# Patient Record
Sex: FEMALE | Race: WHITE | NOT HISPANIC OR LATINO | Employment: OTHER | URBAN - METROPOLITAN AREA
[De-identification: names, ages, dates, MRNs, and addresses within clinical notes are randomized per-mention and may not be internally consistent; named-entity substitution may affect disease eponyms.]

---

## 2017-02-08 ENCOUNTER — LAB VISIT (OUTPATIENT)
Dept: LAB | Facility: HOSPITAL | Age: 57
End: 2017-02-08
Attending: INTERNAL MEDICINE
Payer: MEDICAID

## 2017-02-08 DIAGNOSIS — T62.8X1A: ICD-10-CM

## 2017-02-08 DIAGNOSIS — N18.30 CHRONIC KIDNEY DISEASE, STAGE III (MODERATE): ICD-10-CM

## 2017-02-08 DIAGNOSIS — N15.9: ICD-10-CM

## 2017-02-08 DIAGNOSIS — I10 ESSENTIAL HYPERTENSION, MALIGNANT: ICD-10-CM

## 2017-02-08 DIAGNOSIS — D63.1 ANEMIA IN CHRONIC KIDNEY DISEASE(285.21): Primary | ICD-10-CM
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
## 2017-02-08 DIAGNOSIS — N18.9 ANEMIA IN CHRONIC KIDNEY DISEASE(285.21): Primary | ICD-10-CM

## 2017-02-08 DIAGNOSIS — T82.598A MECHANICAL FAILURE OF INSTRUMENT OR APPARATUS DURING HEART CATHETERIZATION(E874.5): ICD-10-CM

## 2017-02-08 DIAGNOSIS — E11.9 TYPE II OR UNSPECIFIED TYPE DIABETES MELLITUS WITHOUT MENTION OF COMPLICATION, NOT STATED AS UNCONTROLLED: ICD-10-CM

## 2017-02-08 DIAGNOSIS — R80.9 PROTEINURIA: ICD-10-CM

## 2017-02-08 LAB
ALBUMIN SERPL BCP-MCNC: 3.6 G/DL
ANION GAP SERPL CALC-SCNC: 10 MMOL/L
BACTERIA #/AREA URNS HPF: ABNORMAL /HPF
BILIRUB UR QL STRIP: NEGATIVE
BUN SERPL-MCNC: 51 MG/DL
CALCIUM SERPL-MCNC: 9.6 MG/DL
CHLORIDE SERPL-SCNC: 110 MMOL/L
CLARITY UR: CLEAR
CO2 SERPL-SCNC: 20 MMOL/L
COLOR UR: YELLOW
CREAT SERPL-MCNC: 2.4 MG/DL
EST. GFR  (AFRICAN AMERICAN): 25 ML/MIN/1.73 M^2
EST. GFR  (NON AFRICAN AMERICAN): 22 ML/MIN/1.73 M^2
FERRITIN SERPL-MCNC: 108 NG/ML
GLUCOSE SERPL-MCNC: 225 MG/DL
GLUCOSE UR QL STRIP: NEGATIVE
HGB UR QL STRIP: ABNORMAL
HYALINE CASTS #/AREA URNS LPF: 1 /LPF
IRON SERPL-MCNC: 42 UG/DL
KETONES UR QL STRIP: NEGATIVE
LEUKOCYTE ESTERASE UR QL STRIP: ABNORMAL
MAGNESIUM SERPL-MCNC: 2.1 MG/DL
MICROSCOPIC COMMENT: ABNORMAL
NITRITE UR QL STRIP: NEGATIVE
PH UR STRIP: 5 [PH] (ref 5–8)
PHOSPHATE SERPL-MCNC: 4.2 MG/DL
POTASSIUM SERPL-SCNC: 5.5 MMOL/L
PROT UR QL STRIP: ABNORMAL
RBC #/AREA URNS HPF: 2 /HPF (ref 0–4)
SATURATED IRON: 11 %
SODIUM SERPL-SCNC: 140 MMOL/L
SP GR UR STRIP: 1.02 (ref 1–1.03)
SQUAMOUS #/AREA URNS HPF: 5 /HPF
TOTAL IRON BINDING CAPACITY: 371 UG/DL
TRANSFERRIN SERPL-MCNC: 251 MG/DL
URATE SERPL-MCNC: 7.3 MG/DL
URN SPEC COLLECT METH UR: ABNORMAL
UROBILINOGEN UR STRIP-ACNC: NEGATIVE EU/DL
WBC #/AREA URNS HPF: 3 /HPF (ref 0–5)

## 2017-02-08 PROCEDURE — 84550 ASSAY OF BLOOD/URIC ACID: CPT

## 2017-02-08 PROCEDURE — 83540 ASSAY OF IRON: CPT

## 2017-02-08 PROCEDURE — 82728 ASSAY OF FERRITIN: CPT

## 2017-02-08 PROCEDURE — 80069 RENAL FUNCTION PANEL: CPT

## 2017-02-08 PROCEDURE — 36415 COLL VENOUS BLD VENIPUNCTURE: CPT

## 2017-02-08 PROCEDURE — 83735 ASSAY OF MAGNESIUM: CPT

## 2017-02-08 PROCEDURE — 81000 URINALYSIS NONAUTO W/SCOPE: CPT

## 2017-02-13 ENCOUNTER — HOSPITAL ENCOUNTER (OUTPATIENT)
Dept: RADIOLOGY | Facility: HOSPITAL | Age: 57
Discharge: HOME | End: 2017-02-13
Attending: ORTHOPAEDIC SURGERY
Payer: MEDICAID

## 2017-02-13 ENCOUNTER — OFFICE VISIT (OUTPATIENT)
Dept: SPORTS MEDICINE | Facility: CLINIC | Age: 57
End: 2017-02-13
Payer: MEDICAID

## 2017-02-13 VITALS
DIASTOLIC BLOOD PRESSURE: 77 MMHG | WEIGHT: 227 LBS | SYSTOLIC BLOOD PRESSURE: 148 MMHG | HEIGHT: 67 IN | BODY MASS INDEX: 35.63 KG/M2 | HEART RATE: 89 BPM

## 2017-02-13 DIAGNOSIS — M75.22 BICEPS TENDINITIS ON LEFT: ICD-10-CM

## 2017-02-13 DIAGNOSIS — Z22.322 MRSA (METHICILLIN RESISTANT STAPHYLOCOCCUS AUREUS) CARRIER: ICD-10-CM

## 2017-02-13 DIAGNOSIS — M75.102 ROTATOR CUFF SYNDROME, LEFT: ICD-10-CM

## 2017-02-13 DIAGNOSIS — M75.102 ROTATOR CUFF SYNDROME OF LEFT SHOULDER: ICD-10-CM

## 2017-02-13 DIAGNOSIS — M75.102 ROTATOR CUFF SYNDROME OF LEFT SHOULDER: Primary | ICD-10-CM

## 2017-02-13 PROCEDURE — 99214 OFFICE O/P EST MOD 30 MIN: CPT | Mod: S$PBB,,, | Performed by: ORTHOPAEDIC SURGERY

## 2017-02-13 PROCEDURE — 99999 PR PBB SHADOW E&M-EST. PATIENT-LVL V: CPT | Mod: PBBFAC,,, | Performed by: ORTHOPAEDIC SURGERY

## 2017-02-13 NOTE — MR AVS SNAPSHOT
[facility]  [address]  [facility] [address]  Phone: [phone]                  Andra Newell   2017 10:15 AM   Office Visit    Description:  Female : 1960   Provider:  Moe Meléndez MD   Department:  [facility]           Reason for Visit     Left Shoulder - Pain           Diagnoses this Visit        Comments    Rotator cuff syndrome of left shoulder    -  Primary     Rotator cuff syndrome, left         Biceps tendinitis on left         MRSA (methicillin resistant Staphylococcus aureus) carrier                To Do List           Future Appointments        Provider Department Dept Phone    2017 11:05 AM [facility] XR2 SPORTS  LB LIMIT [facility] [phone]      Goals (5 Years of Data)     None      Follow-Up and Disposition     Return in about 6 weeks (around 3/27/2017) for RTC in 6-8 weeks with Brenda Manfsield PA-C for pre-operative history and physical. Patient will not .      [facility]     [facility] -  Assistance  Registered nurses in the [facility] provide clinical advisement, health education, appointment booking, and other advisory services.  Call for this free service at [phone].             Medications           Message regarding Medications     Verify the changes and/or additions to your medication regime listed below are the same as discussed with your clinician today.  If any of these changes or additions are incorrect, please notify your healthcare provider.             Verify that the below list of medications is an accurate representation of the medications you are currently taking.  If none reported, the list may be blank. If incorrect, please contact your healthcare provider. Carry this list with you in case of emergency.           Current Medications     albuterol (PROAIR HFA) 90 mcg/actuation inhaler Inhale 2 puffs into the lungs every 6 (six)  "hours as needed for Wheezing.    allopurinol (ZYLOPRIM) 100 MG tablet Take 100 mg by mouth once daily.    amlodipine (NORVASC) 5 MG tablet Take 5 mg by mouth once daily.    atorvastatin (LIPITOR) 20 MG tablet Take 20 mg by mouth once daily.    benzonatate (TESSALON) 200 MG capsule Take 200 mg by mouth 3 (three) times daily as needed for Cough.    doxazosin (CARDURA) 2 MG tablet Take 2 mg by mouth every evening.    fluticasone (FLONASE) 50 mcg/actuation nasal spray 1 spray by Each Nare route 2 (two) times daily as needed for Rhinitis.    hydrocodone-homatropine 5-1.5 mg/5 ml (HYCODAN) 5-1.5 mg/5 mL Syrp Take 5 mLs by mouth every 4 (four) hours as needed.    INSULIN GLARGINE,HUM.REC.ANLOG (LANTUS SUBQ) Inject 70 Units into the skin nightly.     insulin glulisine (APIDRA) 100 unit/mL injection Inject 2 Units into the skin 3 (three) times daily after meals.    irbesartan (AVAPRO) 150 MG tablet Take 150 mg by mouth every evening.    levothyroxine (SYNTHROID) 100 MCG tablet Take 100 mcg by mouth once daily.    lisinopril (PRINIVIL,ZESTRIL) 5 MG tablet Take 5 mg by mouth once daily.    loratadine (CLARITIN) 10 mg tablet Take 1 tablet (10 mg total) by mouth once daily.    lorazepam (ATIVAN) 2 MG Tab Take 0.5 mg by mouth every 6 (six) hours as needed.    montelukast (SINGULAIR) 10 mg tablet Take 10 mg by mouth every evening.    omeprazole (PRILOSEC) 20 MG capsule Take 20 mg by mouth once daily.    vilazodone (VIIBRYD) 20 mg Tab Take by mouth.    gabapentin (NEURONTIN) 600 MG tablet Take 1 tablet (600 mg total) by mouth 3 (three) times daily.           Clinical Reference Information           Your Vitals Were     BP Pulse Height Weight Last Period BMI    148/77 89 5' 7" (1.702 m) 103 kg (227 lb) 02/28/2012 35.55 kg/m2      Blood Pressure          Most Recent Value    BP  (!)  148/77      Allergies as of 2/13/2017     Codeine      Immunizations Administered on Date of Encounter - 2/13/2017     None      Orders Placed During " Today's Visit      Normal Orders This Visit    Ambulatory Referral to Physical/Occupational Therapy     Future Labs/Procedures Expected by Expires    X-Ray Shoulder 2 or More Views Left  2/13/2017 2/13/2018      Instructions      Shoulder Arthroscopy: Conditions Treated  You will be given instructions for how to prepare for your surgery and when you should arrive at the hospital or surgery center. Just before surgery, a doctor will talk to you about the anesthesia that will be used to keep you free of pain during surgery. You may be asked by several people to confirm which shoulder is being operated on. This is for your safety. Below are common shoulder problems and how they are treated during arthroscopy.       Impingement  · Repeated overhead movements can inflame your rotator cuff and bursa. A bone spur may also form. This causes pain and problems with certain arm movements. Impingement is also called bursitis or tendinitis.  · During surgery, an inflamed bursa may be removed. Bone may be trimmed. And the coracoacromial ligament may be detached. These steps make more room, relieving pressure and allowing the arm to move more freely.    Torn rotator cuff  · A rotator cuff can tear due to a sudden injury or from overuse. This can cause pain, weakness, and loss of normal shoulder movement.  · During surgery, torn rotator cuff tendons may be trimmed. The tendons are then reattached to the humerus. This is done with stitches, anchors, or surgical tacks.    Stretched capsule  · A stretched capsule will remain loose. A loose capsule cant hold the joint firmly in place. The bones of the joint may feel like they move too much and the shoulder can dislocate.  · During arthroscopy, a stretched capsule is folded over itself and sutured in place. (This is done from inside the capsule.) This tightens the capsule, helping make the shoulder joint more stable.    Torn labrum  · The shoulder is a ball and socket joint.  The  labrum normally supports and cushions the shoulder joint. In the case of a torn labrum, the socket that the ball (the upper end of your arm) fits into is not very deep. The shallow socket increases the  potential for instability.  · The labrum may tear off the rim of the glenoid. This can cause the joint to catch or feel like its slipping out of place. The shoulder may even dislocate.  · A torn labrum is repaired by reattaching it to the glenoid. This is often done with special anchors put into the glenoid bone. Sutures attached to the anchors are tied to hold the labrum in place. The joint then feels more stable.       Arthritis and loose bodies  · Arthritis is damage to joint cartilage with age and use. Injury or disease can also cause it. Wear and tear may also lead to loose bodies (pieces of bone or cartilage) or bone spurs in a joint.  · During surgery, bone spurs are removed. Rough parts of the joint are smoothed. Any loose body is removed from the joint. Bone may also be scraped or shaved to promote new cartilage growth.     Date Last Reviewed: 8/31/2015  © 5877-2406 [employer]. [address] [place] [address]. All rights reserved. This information is not intended as a substitute for professional medical care. Always follow your healthcare professional's instructions.        Shoulder Arthroscopy  The shoulder is your bodys most flexible joint. It lets the arm move in almost any direction. But this flexibility has a price -- it makes the joint prone to injury. If you have a shoulder problem, a surgical procedure called arthroscopy can help.     A camera in the arthroscope allows your surgeon to view your shoulder joint on a monitor.   Your orthopaedic evaluation  Your doctor will ask about your symptoms and the history of your shoulder problem. He or she will examine your shoulder and may give you tests, such as an X-ray or MRI. These help your doctor find the cause of your shoulder  problem.  Arthroscopy: Looking inside your joint  Arthroscopy is a procedure that allows your doctor to see and work inside your shoulder joint. Your doctor makes small incision in your shoulder and inserts a long, thin, lighted instrument, called an arthroscope. During surgery, the arthroscope sends live video images from inside your joint to a screen that your doctor views. Using these images, your doctor can diagnose and treat your shoulder problem. Because arthroscopy uses much smaller incisions than open surgery, recovery is often shorter and less painful.  Risks and possible complications of shoulder arthroscopy  · Stiffness or ongoing pain in your shoulder  · Bleeding or blood clots  · Infection  · Damage to nerves or blood vessels  You may still need open surgery after having arthroscopy.  Date Last Reviewed: 9/10/2015  © 4450-3901 [employer]. [address] [place] [address]. All rights reserved. This information is not intended as a substitute for professional medical care. Always follow your healthcare professional's instructions.             Language Assistance Services     ATTENTION: Language assistance services are available, free of charge. Please call [phone].      ATENCIÓN: Si dottiela darwin, tiene a myers disposición servicios gratuitos de asistencia lingüística. Llame al [phone].     ISAÍAS Ý: N?u b?n nói Ti?ng Vi?t, có các d?ch v? h? tr? ngôn ng? mi?n phí dành cho b?n. G?i s? [phone].         [facility] complies with applicable Federal civil rights laws and does not discriminate on the basis of race, color, national origin, age, disability, or sex.
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
## 2017-02-13 NOTE — PATIENT INSTRUCTIONS
Shoulder Arthroscopy: Conditions Treated  You will be given instructions for how to prepare for your surgery and when you should arrive at the hospital or surgery center. Just before surgery, a doctor will talk to you about the anesthesia that will be used to keep you free of pain during surgery. You may be asked by several people to confirm which shoulder is being operated on. This is for your safety. Below are common shoulder problems and how they are treated during arthroscopy.       Impingement  · Repeated overhead movements can inflame your rotator cuff and bursa. A bone spur may also form. This causes pain and problems with certain arm movements. Impingement is also called bursitis or tendinitis.  · During surgery, an inflamed bursa may be removed. Bone may be trimmed. And the coracoacromial ligament may be detached. These steps make more room, relieving pressure and allowing the arm to move more freely.    Torn rotator cuff  · A rotator cuff can tear due to a sudden injury or from overuse. This can cause pain, weakness, and loss of normal shoulder movement.  · During surgery, torn rotator cuff tendons may be trimmed. The tendons are then reattached to the humerus. This is done with stitches, anchors, or surgical tacks.    Stretched capsule  · A stretched capsule will remain loose. A loose capsule cant hold the joint firmly in place. The bones of the joint may feel like they move too much and the shoulder can dislocate.  · During arthroscopy, a stretched capsule is folded over itself and sutured in place. (This is done from inside the capsule.) This tightens the capsule, helping make the shoulder joint more stable.    Torn labrum  · The shoulder is a ball and socket joint.  The labrum normally supports and cushions the shoulder joint. In the case of a torn labrum, the socket that the ball (the upper end of your arm) fits into is not very deep. The shallow socket increases the  potential for  instability.  · The labrum may tear off the rim of the glenoid. This can cause the joint to catch or feel like its slipping out of place. The shoulder may even dislocate.  · A torn labrum is repaired by reattaching it to the glenoid. This is often done with special anchors put into the glenoid bone. Sutures attached to the anchors are tied to hold the labrum in place. The joint then feels more stable.       Arthritis and loose bodies  · Arthritis is damage to joint cartilage with age and use. Injury or disease can also cause it. Wear and tear may also lead to loose bodies (pieces of bone or cartilage) or bone spurs in a joint.  · During surgery, bone spurs are removed. Rough parts of the joint are smoothed. Any loose body is removed from the joint. Bone may also be scraped or shaved to promote new cartilage growth.     Date Last Reviewed: 8/31/2015  © 5904-5490 [employer]. [address] [place] [address]. All rights reserved. This information is not intended as a substitute for professional medical care. Always follow your healthcare professional's instructions.        Shoulder Arthroscopy  The shoulder is your bodys most flexible joint. It lets the arm move in almost any direction. But this flexibility has a price -- it makes the joint prone to injury. If you have a shoulder problem, a surgical procedure called arthroscopy can help.     A camera in the arthroscope allows your surgeon to view your shoulder joint on a monitor.   Your orthopaedic evaluation  Your doctor will ask about your symptoms and the history of your shoulder problem. He or she will examine your shoulder and may give you tests, such as an X-ray or MRI. These help your doctor find the cause of your shoulder problem.  Arthroscopy: Looking inside your joint  Arthroscopy is a procedure that allows your doctor to see and work inside your shoulder joint. Your doctor makes small incision in your shoulder and inserts a long,  thin, lighted instrument, called an arthroscope. During surgery, the arthroscope sends live video images from inside your joint to a screen that your doctor views. Using these images, your doctor can diagnose and treat your shoulder problem. Because arthroscopy uses much smaller incisions than open surgery, recovery is often shorter and less painful.  Risks and possible complications of shoulder arthroscopy  · Stiffness or ongoing pain in your shoulder  · Bleeding or blood clots  · Infection  · Damage to nerves or blood vessels  You may still need open surgery after having arthroscopy.  Date Last Reviewed: 9/10/2015  © 5581-2069 [employer]. [address] [place] [address]. All rights reserved. This information is not intended as a substitute for professional medical care. Always follow your healthcare professional's instructions.

## 2017-02-13 NOTE — LETTER
February 13, 2017        Juliann Martínez, [facility]  [address] [facility] [place] [address]             [facility] [address] [address]  Phone: [phone]   Patient: Andra Newell   MR Number: [id number]   YOB: 1960   Date of Visit: 2/13/2017       Dear Dr. Martínez:    Thank you for referring Andra Newell to me for evaluation. Below are the relevant portions of my assessment and plan of care.       Encounter Diagnoses   Name Primary?    Rotator cuff syndrome of left shoulder Yes    Rotator cuff syndrome, left     Biceps tendinitis on left     MRSA (methicillin resistant Staphylococcus aureus) carrier            1. ASES and SF-12 was not filled out today in clinic.     RTC in 6-8 weeks with Brenda Mansfield PA-C for pre-operative history and physical. Patient will not fill out ASES, SF-12 on return.      2. We reviewed with Andra today, the pathology and natural history of her diagnosis. We have discussed a variety of treatment options including medications, physical therapy and other alternative treatments. I also explained the indications, risks and benefits of surgery. After discussion, Andra decided to proceed with surgery. The decision was made to go forward with :  1. Left shoulder arthroscopic rotator cuff repair   (Collagen scaffold - Rotation Medical Device) / Revision SAD    2. Left shoulder superior capsular reconstruction (Possible)  (Arthrex technique with use of Flex HD graft)    3. Left shoulder arthroscopic lysis of adhesions      The details of the surgical procedure were explained, including the location of probable incisions and a description of likely hardware and/or grafts to be used.  The patient understands the likely convalescence after surgery.  Also, we have thoroughly discussed the risks, benefits and alternatives to surgery, including, but not limited to, the risk of infection, joint  stiffness, blood clot (including DVT and/or pulmonary embolus), neurologic and vascular injury.  It was explained that, if tissue has been repaired or reconstructed, there is a chance of failure, which may require further management.      All of the patient's questions were answered and informed consent was obtained. The patient will contact us if they have any questions or concerns in the interim.    3. Preoperative clearance Juliann Martínez MD and Dr. Oneal (kidney doctor)    4. Letter provided today regarding surgery and clearance              If you have questions, please do not hesitate to call me. I look forward to following Andra along with you.    Sincerely,      Moe Meléndez MD           CC  No Recipients

## 2017-02-13 NOTE — PROGRESS NOTES
Subjective:          Chief Complaint: Andra Newell is a 56 y.o. female who had concerns including Pain of the Left Shoulder.    HPI Comments: Patient is here for a follow up for her left shoulder. She had to cancel her surgery due to PCP clearance.      DATE OF PROCEDURE: 7/12/2016      ATTENDING SURGEON: Surgeon(s) and Role:  * Moe Meléndez MD - Primary  Assistants:  DO Leydi Arrieta SMA      PREOPERATIVE DIAGNOSIS:  Left shoulder   Superior glenoid labrum lesion (SLAP) S43.439A and Tear, biceps tendon, non-traumatic M66.829 A-C Arthritis M12.9, Rotator Cuff Syndrome/Disorder M75.100, Tear, Rotator Cuff, Tramatic S46.012A, S46.011A and Tendinitis, Biceps M75.20      POSTOPERATIVE DIAGNOSIS: Left shoulder   Superior glenoid labrum lesion (SLAP) S43.439A and Tendinitis, Biceps M75.20 A-C Arthritis M12.9, Rotator Cuff Syndrome/Disorder M75.100, Tear, Biceps Tendon, Non-Tramatic M66.829 and Tear, Rotator Cuff, Tramatic S46.012A, S46.011A       PROCEDURES PERFORMED:  1. Left shoulder Arthroscopic rotator cuff repair CPT - 09400      2. Left shoulder Arthroscopic distal clavicle excision CPT - 26262      3. Left shoulder Arthroscopic labral debridement CPT - 20453      4. Left shoulder Arthroscopic lysis of adhesions CPT - 29953      5. Left shoulder Amniox Arthrocentesis, 94956       Pain   Associated symptoms include neck pain. Pertinent negatives include no chest pain, fever, joint swelling, numbness or rash.       Review of Systems   Constitution: Negative for fever and night sweats.   HENT: Negative for hearing loss.    Eyes: Negative for blurred vision and visual disturbance.   Cardiovascular: Negative for chest pain and leg swelling.   Respiratory: Negative for shortness of breath.    Endocrine: Negative for polyuria.   Hematologic/Lymphatic: Negative for bleeding problem.   Skin: Negative for rash.   Musculoskeletal: Positive for joint pain and neck pain. Negative for back pain,  joint swelling, muscle cramps and muscle weakness.   Gastrointestinal: Negative for melena.   Genitourinary: Negative for hematuria.   Neurological: Negative for loss of balance, numbness and paresthesias.   Psychiatric/Behavioral: Negative for altered mental status.       Pain Related Questions  Over the past 3 days, what was your average pain during activity? (I.e. running, jogging, walking, climbing stairs, getting dressed, ect.): 10  Over the past 3 days, what was your highest pain level?: 10  Over the past 3 days, what was your lowest pain level? : 7    Other  How many nights a week are you awakened by your affected body part?: 7  Was the patient's HEIGHT measured or patient reported?: Patient Reported  Was the patient's WEIGHT measured or patient reported?: Measured      Objective:        General: Andra is well-developed, well-nourished, appears stated age, in no acute distress, alert and oriented to time, place and person.     General    Vitals reviewed.  Constitutional: She is oriented to person, place, and time. She appears well-developed and well-nourished. No distress.   HENT:   Nose: Nose normal.   Mouth/Throat: No oropharyngeal exudate.   Eyes: Pupils are equal, round, and reactive to light. Right eye exhibits no discharge. Left eye exhibits no discharge.   Neck: Normal range of motion.   Cardiovascular: Normal rate and intact distal pulses.    Pulmonary/Chest: Effort normal and breath sounds normal. No respiratory distress.   Neurological: She is alert and oriented to person, place, and time. She has normal reflexes. She displays normal reflexes. No cranial nerve deficit. Coordination normal.   Psychiatric: She has a normal mood and affect. Her behavior is normal. Judgment and thought content normal.     General Musculoskeletal Exam   Gait: normal       Right Knee Exam     Inspection   Erythema: absent  Scars: absent  Swelling: absent  Effusion: effusion  Deformity: deformity  Bruising:  absent    Tenderness   The patient is experiencing no tenderness.         Range of Motion   Extension: 0   Flexion: 140     Tests   Meniscus   Sonja:  Medial - negative Lateral - negative  Ligament Examination Lachman: normal (-1 to 2mm) PCL-Posterior Drawer: normal (0 to 2mm)     MCL - Valgus: normal (0 to 2mm)  LCL - Varus: normalPivot Shift: normal (Equal)Reverse Pivot Shift: normal (Equal)Dial Test at 30 degrees: normal (< 5 degrees)Dial Test at 90 degrees: normal (< 5 degrees)  Posterior Sag Test: negative  Posterolateral Corner: unstable (>15 degrees difference)  Patella   Patellar Apprehension: negative  Passive Patellar Tilt: neutral  Patellar Tracking: normal  Patellar Glide (quadrants): Lateral - 1   Medial - 2  Q-Angle at 90 degrees: normal  Patellar Grind: negative  J-Sign: none    Other   Meniscal Cyst: absent  Popliteal (Baker's) Cyst: absent  Sensation: normal    Left Knee Exam     Inspection   Erythema: absent  Scars: absent  Swelling: absent  Effusion: absent  Deformity: deformity  Bruising: absent    Tenderness   The patient is experiencing no tenderness.         Range of Motion   Extension: 0   Flexion: 140     Tests   Meniscus   Sonja:  Medial - negative Lateral - negative  Stability Lachman: normal (-1 to 2mm) PCL-Posterior Drawer: normal (0 to 2mm)  MCL - Valgus: normal (0 to 2mm)  LCL - Varus: normal (0 to 2mm)Pivot Shift: normal (Equal)Reverse Pivot Shift: normal (Equal)Dial Test at 30 degrees: normal (< 5 degrees)Dial Test at 90 degrees: normal (< 5 degrees)  Posterior Sag Test: negative  Posterolateral Corner: unstable (>15 degrees difference)  Patella   Patellar Apprehension: negative  Passive Patellar Tilt: neutral  Patellar Tracking: normal  Patellar Glide (Quadrants): Lateral - 1 Medial - 2  Q-Angle at 90 degrees: normal  Patellar Grind: negative  J-Sign: J sign absent    Other   Meniscal Cyst: absent  Popliteal (Baker's) Cyst: absent  Sensation: normal    Right Hip Exam     Tests    Rox: negative  Left Hip Exam     Tests   Rxo: negative      Back (L-Spine & T-Spine) / Neck (C-Spine) Exam     Tenderness Right paramedian tenderness of the Upper C-Spine and Upper L-Spine. Left paramedian tenderness of the Upper C-Spine and Lower L-Spine.     Neck (C-Spine) Range of Motion   Flexion:     Limited  Extension: Limited  Right Shoulder Exam     Inspection/Observation   Swelling: absent  Bruising: absent  Scars: absent  Deformity: absent  Scapular Winging: absent  Scapular Dyskinesia: negative  Atrophy: absent    Tenderness   The patient is experiencing no tenderness.        Range of Motion   Active Abduction:  90 normal   Passive Abduction:  100 normal   Extension:  0 normal   Forward Flexion:  180 normal   Forward Elevation: 180 normal  Adduction: 40 normal  External Rotation 0 degrees:  50 normal   External Rotation 90 degrees: 90 normal  Internal Rotation 0 degrees:  T12 normal   Internal Rotation 90 degrees:  30 normal     Tests & Signs   Apprehension: negative  Cross Arm: negative  Drop Arm: negative  Hawkin's test: negative  Impingement: negative  Sulcus: absent  Anterosuperior Escape: negative  Lag Sign 0 degrees: negative  Lag Sign 90 degrees: negative  Lift Off Sign: negative  Belly Press: negative  Active Compression Test (Tama's Sign): negative  Yergason's Test: negative  Speed's Test: negative  Anterior Drawer Test: 1+   Posterior Drawer Test: 1+  Relocation 90 degrees: negative  Relocation > 90 degrees: negative  Bear Hug: negative  Moving Valgus: negative  Jerk Test: negative    Other   Sensation: normal    Left Shoulder Exam     Inspection/Observation   Swelling: absent  Bruising: absent  Scars: present  Deformity: absent  Scapular Winging: absent  Scapular Dyskinesia: negative  Atrophy: absent    Tenderness   The patient is tender to palpation of the acromioclavicular joint and greater tuberosity.    Range of Motion   Active Abduction:  70 normal   Passive Abduction:  80 abnormal    Extension:  0 normal   Forward Flexion:  140 abnormal   Forward Elevation: 140 abnormal  Adduction: 40 abnormal  External Rotation 0 degrees:  40 abnormal   External Rotation 90 degrees: 60 abnormal  Internal Rotation 0 degrees:  L1 normal   Internal Rotation 90 degrees:  20 normal     Tests & Signs   Apprehension: negative  Cross Arm: negative  Drop Arm: positive  Hawkin's test: positive  Impingement: positive  Sulcus: absent  Rotator Cuff Painful Arc/Range: severe  Anterosuperior Escape: negative  Lag Sign 0 degrees: negative  Lag Sign 90 degrees: negative  Lift Off Sign: negative  Belly Press: negative  Active Compression test (Haines's Sign): negative  Yergasons's Test: negative  Speed's Test: negative  Anterior Drawer Test: 1+  Posterior Drawer Test: 1+  Relocation 90 degrees: negative  Relocation > 90 degrees: negative  Bear Hug: negative  Moving Valgus: negative  Jerk Test: negative    Other   Sensation: normal     Comments:  Significant guarding of left shoulder.      Muscle Strength   Right Upper Extremity   Shoulder Abduction: 5/5   Shoulder Internal Rotation: 5/5   Shoulder External Rotation: 5/5   Supraspinatus: 5/5/5   Subscapularis: 5/5/5   Biceps: 5/5/5   Deltoid:  5/5  Triceps:  5/5  Wrist Extension: 5/5/5   Wrist Flexion: 5/5/5   Finger Flexors:  5/5  Finger Extensors:  5/5  Left Upper Extremity  Shoulder Abduction: 5/5   Shoulder Internal Rotation: 4/5   Shoulder External Rotation: 4/5   Supraspinatus: 4/5/5   Subscapularis: 4/5/5   Biceps: 4/5/5     Reflexes     Left Side  Biceps:  2+  Triceps:  2+  Brachioradialis:  2+  Quadriceps:  2+  Achilles:  2+    Right Side   Biceps:  2+  Triceps:  2+  Brachioradialis:  2+  Quadriceps:  2+  Achilles:  2+    Vascular Exam     Right Pulses  Dorsalis Pedis:      2+  Posterior Tibial:      2+  Radial:                    2+      Left Pulses  Dorsalis Pedis:      2+  Posterior Tibial:      2+  Radial:                    2+      Capillary Refill  Right Hand:  normal capillary refill  Left Hand: normal capillary refill    Outside MRI results  1. Post-surgical changes  2. Small full thickness tear of the distal supraspinatus is still suggested  3. Increasing tendinosis with some intratendinous partial tear of the supraspinatus at the musculotendinous junction  4. Abnormal appearing anterior superior labrum as on previous exam  5. Joint effusions, fluid in the subacromial/subdeltoid bursae and in the AC joint  6. AC joint is borderline slightly wider than on previous exam  7. Subscapularis tendinosis, no tear        Assessment:       Encounter Diagnoses   Name Primary?    Rotator cuff syndrome of left shoulder Yes    Rotator cuff syndrome, left     Biceps tendinitis on left     MRSA (methicillin resistant Staphylococcus aureus) carrier           Plan:       1. ASES and SF-12 was not filled out today in clinic.     RTC in 6-8 weeks with Brenda Mansfield PA-C for pre-operative history and physical. Patient will not fill out ASES, SF-12 on return.      2. We reviewed with Andra today, the pathology and natural history of her diagnosis. We have discussed a variety of treatment options including medications, physical therapy and other alternative treatments. I also explained the indications, risks and benefits of surgery. After discussion, Andra decided to proceed with surgery. The decision was made to go forward with :  1. Left shoulder arthroscopic rotator cuff repair   (Collagen scaffold - Rotation Medical Device) / Revision SAD    2. Left shoulder superior capsular reconstruction (Possible)  (Arthrex technique with use of Flex HD graft)    3. Left shoulder arthroscopic lysis of adhesions      The details of the surgical procedure were explained, including the location of probable incisions and a description of likely hardware and/or grafts to be used.  The patient understands the likely convalescence after surgery.  Also, we have thoroughly discussed the risks, benefits  and alternatives to surgery, including, but not limited to, the risk of infection, joint stiffness, blood clot (including DVT and/or pulmonary embolus), neurologic and vascular injury.  It was explained that, if tissue has been repaired or reconstructed, there is a chance of failure, which may require further management.      All of the patient's questions were answered and informed consent was obtained. The patient will contact us if they have any questions or concerns in the interim.    3. Preoperative clearance Juliann Martínez MD and Dr. Oneal (kidney doctor)    4. Letter provided today regarding surgery and clearance                      [facility] patient questionnaires have been collected today.

## 2017-03-13 ENCOUNTER — LAB VISIT (OUTPATIENT)
Dept: LAB | Facility: HOSPITAL | Age: 57
End: 2017-03-13
Attending: ORTHOPAEDIC SURGERY
Payer: MEDICAID
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
## 2017-03-13 DIAGNOSIS — T82.598A MECHANICAL FAILURE OF INSTRUMENT OR APPARATUS DURING HEART CATHETERIZATION(E874.5): ICD-10-CM

## 2017-03-13 DIAGNOSIS — I10 ESSENTIAL HYPERTENSION, MALIGNANT: ICD-10-CM

## 2017-03-13 DIAGNOSIS — N17.9 ACUTE KIDNEY FAILURE, UNSPECIFIED: ICD-10-CM

## 2017-03-13 DIAGNOSIS — D50.9 IRON DEFICIENCY ANEMIA, UNSPECIFIED: ICD-10-CM

## 2017-03-13 DIAGNOSIS — E11.9 TYPE II OR UNSPECIFIED TYPE DIABETES MELLITUS WITHOUT MENTION OF COMPLICATION, NOT STATED AS UNCONTROLLED: ICD-10-CM

## 2017-03-13 DIAGNOSIS — M15.9 GENERALIZED OSTEOARTHROSIS, UNSPECIFIED SITE: ICD-10-CM

## 2017-03-13 DIAGNOSIS — E87.20 ACIDOSIS: Primary | ICD-10-CM

## 2017-03-13 DIAGNOSIS — N18.30 CHRONIC KIDNEY DISEASE, STAGE III (MODERATE): ICD-10-CM

## 2017-03-13 LAB
ALBUMIN SERPL BCP-MCNC: 3.6 G/DL
ANION GAP SERPL CALC-SCNC: 9 MMOL/L
APTT BLDCRRT: 27.8 SEC
BUN SERPL-MCNC: 32 MG/DL
CALCIUM SERPL-MCNC: 9.6 MG/DL
CHLORIDE SERPL-SCNC: 111 MMOL/L
CO2 SERPL-SCNC: 22 MMOL/L
CREAT SERPL-MCNC: 2.1 MG/DL
CREAT UR-MCNC: 72.7 MG/DL
EOSINOPHIL URNS QL WRIGHT STN: NORMAL
EST. GFR  (AFRICAN AMERICAN): 29 ML/MIN/1.73 M^2
EST. GFR  (NON AFRICAN AMERICAN): 25 ML/MIN/1.73 M^2
GLUCOSE SERPL-MCNC: 239 MG/DL
INR PPP: 1.1
MAGNESIUM SERPL-MCNC: 1.8 MG/DL
PHOSPHATE SERPL-MCNC: 3.8 MG/DL
POTASSIUM SERPL-SCNC: 4.6 MMOL/L
PROT UR-MCNC: 86 MG/DL
PROTHROMBIN TIME: 11.1 SEC
SODIUM SERPL-SCNC: 142 MMOL/L
URATE SERPL-MCNC: 7.5 MG/DL

## 2017-03-13 PROCEDURE — 84550 ASSAY OF BLOOD/URIC ACID: CPT

## 2017-03-13 PROCEDURE — 87205 SMEAR GRAM STAIN: CPT

## 2017-03-13 PROCEDURE — 85610 PROTHROMBIN TIME: CPT

## 2017-03-13 PROCEDURE — 83735 ASSAY OF MAGNESIUM: CPT

## 2017-03-13 PROCEDURE — 36415 COLL VENOUS BLD VENIPUNCTURE: CPT

## 2017-03-13 PROCEDURE — 84156 ASSAY OF PROTEIN URINE: CPT

## 2017-03-13 PROCEDURE — 80069 RENAL FUNCTION PANEL: CPT

## 2017-03-13 PROCEDURE — 82570 ASSAY OF URINE CREATININE: CPT

## 2017-03-13 PROCEDURE — 85730 THROMBOPLASTIN TIME PARTIAL: CPT

## 2017-03-23 ENCOUNTER — HOSPITAL ENCOUNTER (OUTPATIENT)
Dept: RADIOLOGY | Facility: HOSPITAL | Age: 57
Discharge: HOME | End: 2017-03-23
Attending: INTERNAL MEDICINE
Payer: MEDICAID

## 2017-03-23 DIAGNOSIS — N17.9 AKI (ACUTE KIDNEY INJURY): ICD-10-CM

## 2017-03-23 DIAGNOSIS — R80.9 PROTEINURIA: ICD-10-CM

## 2017-03-23 DIAGNOSIS — E11.9 DM (DIABETES MELLITUS): ICD-10-CM

## 2017-03-23 DIAGNOSIS — N18.30 CHRONIC KIDNEY DISEASE, STAGE III (MODERATE): ICD-10-CM

## 2017-03-23 DIAGNOSIS — I10 ESSENTIAL (PRIMARY) HYPERTENSION: ICD-10-CM

## 2017-03-23 DIAGNOSIS — M15.9 DJD (DEGENERATIVE JOINT DISEASE), MULTIPLE SITES: ICD-10-CM

## 2017-03-23 DIAGNOSIS — E86.0 DEHYDRATION: ICD-10-CM

## 2017-03-23 PROCEDURE — 76770 US EXAM ABDO BACK WALL COMP: CPT | Mod: 26,,, | Performed by: RADIOLOGY

## 2017-03-23 PROCEDURE — 76770 US EXAM ABDO BACK WALL COMP: CPT | Mod: TC

## 2017-03-27 ENCOUNTER — HOSPITAL ENCOUNTER (OUTPATIENT)
Facility: HOSPITAL | Age: 57
Discharge: HOME | End: 2017-03-29
Attending: EMERGENCY MEDICINE | Admitting: INTERNAL MEDICINE
Payer: MEDICAID

## 2017-03-27 DIAGNOSIS — M54.50 ACUTE BILATERAL LOW BACK PAIN WITHOUT SCIATICA: ICD-10-CM

## 2017-03-27 DIAGNOSIS — R53.83 FATIGUE, UNSPECIFIED TYPE: Primary | ICD-10-CM

## 2017-03-27 DIAGNOSIS — R07.9 CHEST PAIN, UNSPECIFIED TYPE: ICD-10-CM

## 2017-03-27 DIAGNOSIS — I10 ESSENTIAL HYPERTENSION: ICD-10-CM

## 2017-03-27 PROBLEM — E03.9 HYPOTHYROIDISM: Status: ACTIVE | Noted: 2017-03-27

## 2017-03-27 PROBLEM — N17.9 AKI (ACUTE KIDNEY INJURY): Status: ACTIVE | Noted: 2017-03-27

## 2017-03-27 PROBLEM — N12 INTERSTITIAL NEPHRITIS: Status: ACTIVE | Noted: 2017-03-27

## 2017-03-27 PROBLEM — R51.9 ACUTE HEADACHE: Status: ACTIVE | Noted: 2017-03-27

## 2017-03-27 LAB
ALBUMIN SERPL BCP-MCNC: 3.7 G/DL
ALP SERPL-CCNC: 183 U/L
ALT SERPL W/O P-5'-P-CCNC: 23 U/L
ANION GAP SERPL CALC-SCNC: 10 MMOL/L
AST SERPL-CCNC: 19 U/L
BACTERIA #/AREA URNS HPF: NORMAL /HPF
BASOPHILS # BLD AUTO: 0.3 K/UL
BASOPHILS NFR BLD: 2.4 %
BILIRUB SERPL-MCNC: 0.4 MG/DL
BILIRUB UR QL STRIP: NEGATIVE
BNP SERPL-MCNC: <10 PG/ML
BUN SERPL-MCNC: 43 MG/DL
CALCIUM SERPL-MCNC: 9.4 MG/DL
CHLORIDE SERPL-SCNC: 108 MMOL/L
CK SERPL-CCNC: 66 U/L
CLARITY UR: CLEAR
CO2 SERPL-SCNC: 24 MMOL/L
COLOR UR: YELLOW
CREAT SERPL-MCNC: 2.5 MG/DL
CRP SERPL-MCNC: 3.8 MG/L
DIFFERENTIAL METHOD: ABNORMAL
EOSINOPHIL # BLD AUTO: 0.8 K/UL
EOSINOPHIL NFR BLD: 7.1 %
ERYTHROCYTE [DISTWIDTH] IN BLOOD BY AUTOMATED COUNT: 15.6 %
EST. GFR  (AFRICAN AMERICAN): 24 ML/MIN/1.73 M^2
EST. GFR  (NON AFRICAN AMERICAN): 20 ML/MIN/1.73 M^2
FLUAV AG SPEC QL IA: NEGATIVE
FLUBV AG SPEC QL IA: NEGATIVE
GLUCOSE SERPL-MCNC: 135 MG/DL
GLUCOSE UR QL STRIP: NEGATIVE
HCT VFR BLD AUTO: 31.1 %
HGB BLD-MCNC: 10.1 G/DL
HGB UR QL STRIP: ABNORMAL
HYALINE CASTS #/AREA URNS LPF: 0 /LPF
INR PPP: 1.1
KETONES UR QL STRIP: NEGATIVE
LEUKOCYTE ESTERASE UR QL STRIP: NEGATIVE
LYMPHOCYTES # BLD AUTO: 2.2 K/UL
LYMPHOCYTES NFR BLD: 21.2 %
MCH RBC QN AUTO: 28.1 PG
MCHC RBC AUTO-ENTMCNC: 32.3 %
MCV RBC AUTO: 87 FL
MICROSCOPIC COMMENT: NORMAL
MONOCYTES # BLD AUTO: 0.5 K/UL
MONOCYTES NFR BLD: 5.1 %
NEUTROPHILS # BLD AUTO: 6.8 K/UL
NEUTROPHILS NFR BLD: 64.2 %
NITRITE UR QL STRIP: NEGATIVE
PH UR STRIP: 5 [PH] (ref 5–8)
PLATELET # BLD AUTO: 187 K/UL
PMV BLD AUTO: 7.9 FL
POCT GLUCOSE: 199 MG/DL (ref 70–110)
POTASSIUM SERPL-SCNC: 5.1 MMOL/L
PROT SERPL-MCNC: 7.1 G/DL
PROT UR QL STRIP: ABNORMAL
PROTHROMBIN TIME: 11.1 SEC
RBC # BLD AUTO: 3.58 M/UL
RBC #/AREA URNS HPF: 1 /HPF (ref 0–4)
SODIUM SERPL-SCNC: 142 MMOL/L
SP GR UR STRIP: 1.01 (ref 1–1.03)
SPECIMEN SOURCE: NORMAL
SQUAMOUS #/AREA URNS HPF: 4 /HPF
TROPONIN I SERPL DL<=0.01 NG/ML-MCNC: 0.02 NG/ML
TROPONIN I SERPL DL<=0.01 NG/ML-MCNC: 0.02 NG/ML
TROPONIN I SERPL DL<=0.01 NG/ML-MCNC: 0.03 NG/ML
URATE SERPL-MCNC: 7.6 MG/DL
URN SPEC COLLECT METH UR: ABNORMAL
UROBILINOGEN UR STRIP-ACNC: NEGATIVE EU/DL
WBC # BLD AUTO: 10.6 K/UL
WBC #/AREA URNS HPF: 0 /HPF (ref 0–5)

## 2017-03-27 PROCEDURE — 25000003 PHARM REV CODE 250: Performed by: EMERGENCY MEDICINE

## 2017-03-27 PROCEDURE — G0378 HOSPITAL OBSERVATION PER HR: HCPCS

## 2017-03-27 PROCEDURE — 93005 ELECTROCARDIOGRAM TRACING: CPT

## 2017-03-27 PROCEDURE — 86140 C-REACTIVE PROTEIN: CPT

## 2017-03-27 PROCEDURE — 80053 COMPREHEN METABOLIC PANEL: CPT

## 2017-03-27 PROCEDURE — 85610 PROTHROMBIN TIME: CPT

## 2017-03-27 PROCEDURE — 84439 ASSAY OF FREE THYROXINE: CPT

## 2017-03-27 PROCEDURE — 81000 URINALYSIS NONAUTO W/SCOPE: CPT

## 2017-03-27 PROCEDURE — 96360 HYDRATION IV INFUSION INIT: CPT

## 2017-03-27 PROCEDURE — 63600175 PHARM REV CODE 636 W HCPCS: Performed by: INTERNAL MEDICINE

## 2017-03-27 PROCEDURE — 99284 EMERGENCY DEPT VISIT MOD MDM: CPT | Mod: 25

## 2017-03-27 PROCEDURE — 87400 INFLUENZA A/B EACH AG IA: CPT

## 2017-03-27 PROCEDURE — 99219 PR INITIAL OBSERVATION CARE,LEVL II: CPT | Mod: ,,, | Performed by: INTERNAL MEDICINE

## 2017-03-27 PROCEDURE — 82550 ASSAY OF CK (CPK): CPT

## 2017-03-27 PROCEDURE — 84484 ASSAY OF TROPONIN QUANT: CPT | Mod: 91

## 2017-03-27 PROCEDURE — 84443 ASSAY THYROID STIM HORMONE: CPT

## 2017-03-27 PROCEDURE — 96361 HYDRATE IV INFUSION ADD-ON: CPT

## 2017-03-27 PROCEDURE — 84550 ASSAY OF BLOOD/URIC ACID: CPT

## 2017-03-27 PROCEDURE — 83880 ASSAY OF NATRIURETIC PEPTIDE: CPT

## 2017-03-27 PROCEDURE — 85025 COMPLETE CBC W/AUTO DIFF WBC: CPT

## 2017-03-27 PROCEDURE — 83036 HEMOGLOBIN GLYCOSYLATED A1C: CPT

## 2017-03-27 PROCEDURE — 36415 COLL VENOUS BLD VENIPUNCTURE: CPT

## 2017-03-27 RX ORDER — INSULIN ASPART 100 [IU]/ML
1-10 INJECTION, SOLUTION INTRAVENOUS; SUBCUTANEOUS
Status: DISCONTINUED | OUTPATIENT
Start: 2017-03-27 | End: 2017-03-29 | Stop reason: HOSPADM

## 2017-03-27 RX ORDER — PANTOPRAZOLE SODIUM 40 MG/1
40 TABLET, DELAYED RELEASE ORAL DAILY
Status: DISCONTINUED | OUTPATIENT
Start: 2017-03-28 | End: 2017-03-29 | Stop reason: HOSPADM

## 2017-03-27 RX ORDER — LEVOTHYROXINE SODIUM 50 UG/1
100 TABLET ORAL
Status: DISCONTINUED | OUTPATIENT
Start: 2017-03-28 | End: 2017-03-28

## 2017-03-27 RX ORDER — VILAZODONE HYDROCHLORIDE 40 MG/1
40 TABLET ORAL DAILY
COMMUNITY
End: 2018-07-02 | Stop reason: SDUPTHER

## 2017-03-27 RX ORDER — GLUCAGON 1 MG
1 KIT INJECTION
Status: DISCONTINUED | OUTPATIENT
Start: 2017-03-27 | End: 2017-03-29 | Stop reason: HOSPADM

## 2017-03-27 RX ORDER — MYCOPHENOLATE MOFETIL 500 MG/1
TABLET ORAL 2 TIMES DAILY
COMMUNITY
End: 2017-03-27

## 2017-03-27 RX ORDER — SODIUM BICARBONATE 650 MG/1
650 TABLET ORAL DAILY
Status: DISCONTINUED | OUTPATIENT
Start: 2017-03-28 | End: 2017-03-29 | Stop reason: HOSPADM

## 2017-03-27 RX ORDER — ALLOPURINOL 100 MG/1
100 TABLET ORAL DAILY
Status: DISCONTINUED | OUTPATIENT
Start: 2017-03-28 | End: 2017-03-29

## 2017-03-27 RX ORDER — ATORVASTATIN CALCIUM 20 MG/1
20 TABLET, FILM COATED ORAL DAILY
Status: DISCONTINUED | OUTPATIENT
Start: 2017-03-28 | End: 2017-03-29 | Stop reason: HOSPADM

## 2017-03-27 RX ORDER — IBUPROFEN 200 MG
24 TABLET ORAL
Status: DISCONTINUED | OUTPATIENT
Start: 2017-03-27 | End: 2017-03-29 | Stop reason: HOSPADM

## 2017-03-27 RX ORDER — DOXAZOSIN 1 MG/1
2 TABLET ORAL NIGHTLY
Status: DISCONTINUED | OUTPATIENT
Start: 2017-03-28 | End: 2017-03-28

## 2017-03-27 RX ORDER — SODIUM CHLORIDE 9 MG/ML
INJECTION, SOLUTION INTRAVENOUS CONTINUOUS
Status: DISCONTINUED | OUTPATIENT
Start: 2017-03-27 | End: 2017-03-29 | Stop reason: HOSPADM

## 2017-03-27 RX ORDER — IBUPROFEN 200 MG
16 TABLET ORAL
Status: DISCONTINUED | OUTPATIENT
Start: 2017-03-27 | End: 2017-03-29 | Stop reason: HOSPADM

## 2017-03-27 RX ORDER — VILAZODONE HYDROCHLORIDE 10 MG/1
40 TABLET ORAL DAILY
Status: DISCONTINUED | OUTPATIENT
Start: 2017-03-28 | End: 2017-03-29 | Stop reason: HOSPADM

## 2017-03-27 RX ORDER — AMLODIPINE BESYLATE 5 MG/1
5 TABLET ORAL DAILY
Status: DISCONTINUED | OUTPATIENT
Start: 2017-03-28 | End: 2017-03-29 | Stop reason: HOSPADM

## 2017-03-27 RX ORDER — AMOXICILLIN 250 MG
1 CAPSULE ORAL DAILY PRN
Status: DISCONTINUED | OUTPATIENT
Start: 2017-03-27 | End: 2017-03-29 | Stop reason: HOSPADM

## 2017-03-27 RX ORDER — RAMELTEON 8 MG/1
8 TABLET ORAL NIGHTLY PRN
Status: DISCONTINUED | OUTPATIENT
Start: 2017-03-28 | End: 2017-03-29 | Stop reason: HOSPADM

## 2017-03-27 RX ORDER — COLCHICINE 0.6 MG/1
0.6 TABLET ORAL DAILY
COMMUNITY
End: 2017-04-12

## 2017-03-27 RX ORDER — SODIUM BICARBONATE 650 MG/1
650 TABLET ORAL DAILY
COMMUNITY
End: 2018-07-02

## 2017-03-27 RX ORDER — ACETAMINOPHEN 325 MG/1
650 TABLET ORAL EVERY 6 HOURS PRN
Status: DISCONTINUED | OUTPATIENT
Start: 2017-03-27 | End: 2017-03-27

## 2017-03-27 RX ORDER — INSULIN GLARGINE 100 [IU]/ML
80 INJECTION, SOLUTION SUBCUTANEOUS 2 TIMES DAILY
Status: DISCONTINUED | OUTPATIENT
Start: 2017-03-27 | End: 2017-03-27

## 2017-03-27 RX ORDER — ACETAMINOPHEN 325 MG/1
650 TABLET ORAL EVERY 6 HOURS PRN
Status: DISCONTINUED | OUTPATIENT
Start: 2017-03-27 | End: 2017-03-29 | Stop reason: HOSPADM

## 2017-03-27 RX ORDER — ONDANSETRON 2 MG/ML
4 INJECTION INTRAMUSCULAR; INTRAVENOUS EVERY 8 HOURS PRN
Status: DISCONTINUED | OUTPATIENT
Start: 2017-03-27 | End: 2017-03-29 | Stop reason: HOSPADM

## 2017-03-27 RX ORDER — IRBESARTAN 150 MG/1
150 TABLET ORAL NIGHTLY
Status: DISCONTINUED | OUTPATIENT
Start: 2017-03-28 | End: 2017-03-29 | Stop reason: HOSPADM

## 2017-03-27 RX ORDER — LORATADINE 10 MG/1
10 TABLET ORAL DAILY
COMMUNITY
End: 2018-07-02 | Stop reason: SDUPTHER

## 2017-03-27 RX ADMIN — SODIUM CHLORIDE: 0.9 INJECTION, SOLUTION INTRAVENOUS at 09:03

## 2017-03-27 RX ADMIN — SODIUM CHLORIDE 1000 ML: 0.9 INJECTION, SOLUTION INTRAVENOUS at 06:03

## 2017-03-27 RX ADMIN — INSULIN DETEMIR 20 UNITS: 100 INJECTION, SOLUTION SUBCUTANEOUS at 09:03

## 2017-03-27 NOTE — ED PROVIDER NOTES
Encounter Date: 3/27/2017       History     Chief Complaint   Patient presents with    Fatigue    Flank Pain     bilat    Headache     Review of patient's allergies indicates:   Allergen Reactions    Codeine Itching     Can take oxycodone and hydrocodone with Benadryl     HPI Comments: 57-year-old female with a recent diagnosis of interstitial nephritis on mycophenolate presents to the emergency room for multiple complaints all of which began today.  Patient was seeing her primary care physician for the first time at 1045 when she began to develop some low back pain which has been persistent.  This is bilateral.  No radiating back pain.  No aggravating or alleviating factors.  Also complaining of sharp intermittent left-sided chest pain which has been present off and on today.  She is also having some shortness of breath.  Also having an occipital headache.  Also complaining of extreme fatigue which is unusual for her.  Headache began gradually after waking up from a nap.  Not sudden in onset and not maximal in onset.  No aggravating or alleviating factors for the headache.  Nausea but no vomiting.  No fevers or chills or abdominal pain or diarrhea.    The history is provided by the patient and the spouse.     Past Medical History:   Diagnosis Date    Arthritis     Asthma     allergic airway    Depression     Diabetes mellitus     Gout     Hypertension     Renal disorder     Thyroid disease      Past Surgical History:   Procedure Laterality Date    ACHILLES TENDON SURGERY Left May 2015    BACK SURGERY      CHOLECYSTECTOMY      HEMORRHOID SURGERY      HIP SURGERY      JOINT REPLACEMENT      left    KNEE SURGERY      SHOULDER ARTHROSCOPY      SHOULDER SURGERY       Family History   Problem Relation Age of Onset    Diabetes Mother     Diabetes Sister     Lupus Sister     Heart disease Brother      Social History   Substance Use Topics    Smoking status: Never Smoker    Smokeless tobacco:  Never Used    Alcohol use No     Review of Systems   Constitutional: Positive for fatigue. Negative for fever.   Respiratory: Negative for shortness of breath.    Gastrointestinal: Positive for nausea. Negative for abdominal pain and vomiting.   Genitourinary: Positive for flank pain (bilateral).   Neurological: Positive for headaches (Occipital).   All other systems reviewed and are negative.      Physical Exam   Initial Vitals   BP Pulse Resp Temp SpO2   03/27/17 1559 03/27/17 1559 03/27/17 1559 03/27/17 1559 03/27/17 1559   158/68 106 10 98.3 °F (36.8 °C) 97 %     Physical Exam    Nursing note and vitals reviewed.  Constitutional: She appears well-developed and well-nourished. She is not diaphoretic. No distress.   Appears fatigued and uncomfortable.  Wearing a surgical mask.   HENT:   Head: Normocephalic and atraumatic.   Eyes: EOM are normal.   Neck: Normal range of motion. Neck supple.   Cardiovascular: Normal rate, regular rhythm and normal heart sounds.   No murmur heard.  Pulmonary/Chest: Breath sounds normal. No respiratory distress. She has no rales.   Abdominal: Soft. She exhibits no distension. There is no tenderness. There is no rebound.   No reproducible flank pain on palpation   Musculoskeletal: Normal range of motion.   Neurological: She is alert and oriented to person, place, and time.   Skin: Skin is warm and dry.   Psychiatric: She has a normal mood and affect. Her behavior is normal. Judgment and thought content normal.         ED Course   Procedures  Labs Reviewed   CBC W/ AUTO DIFFERENTIAL   COMPREHENSIVE METABOLIC PANEL   PROTIME-INR   TROPONIN I   B-TYPE NATRIURETIC PEPTIDE   URINALYSIS     EKG Readings: (Independently Interpreted)   Initial Reading: No STEMI. Previous EKG: Compared with most recent EKG Previous EKG Date: No change from August 16, 2016. Rhythm: Normal Sinus Rhythm. Heart Rate: 94. Ectopy: No Ectopy. Conduction: Normal. ST Segments: Normal ST Segments. T Waves: Normal. Axis:  Left Axis Deviation. Clinical Impression: Normal Sinus Rhythm          Medical Decision Making:   History:   I obtained history from: someone other than patient.       <> Summary of History: wife  Old Medical Records: I decided to obtain old medical records.  Clinical Tests:   Lab Tests: Ordered and Reviewed  Radiological Study: Ordered and Reviewed  Medical Tests: Ordered and Reviewed                   ED Course   Comment By Time   Case discussed with Dr. eldridge of [facility] medicine who agrees with admission to the hospital.  Symptoms are probably viral. Jean Schmitz MD 03/27 1368   Discussed with Dr. Oneal who is the patient's nephrologist, he does agree with admission to the hospital and also stopping CellCept. Jean Schmitz MD 03/27 8605     Clinical Impression:   The encounter diagnosis was Fatigue, unspecified type.        57-year-old female with a recent diagnosis of interstitial nephritis presents with headache and intermittent stabbing chest pain for one day.  Appears fatigued in the emergency room but no acute distress. I did discuss the case with the patient's nephrologist who agrees with admission to the hospital for IV fluids.  Also recommends stopping the CellCept.  Patient admitted to Dr. Jordan of [facility] medicine.     Jean Schmitz MD  03/27/17 9961

## 2017-03-27 NOTE — IP AVS SNAPSHOT
[address] [place] [address]  Phone: [phone]           Patient Discharge Instructions   Our goal is to set you up for success. This packet includes information on your condition, medications, and your home care.  It will help you care for yourself to prevent having to return to the hospital.     Please ask your nurse if you have any questions.      There are many details to remember when preparing to leave the hospital. Here is what you will need to do:    1. Take your medicine. If you are prescribed medications, review your Medication List on the following pages. You may have new medications to  at the pharmacy and others that you'll need to stop taking. Review the instructions for how and when to take your medications. Talk with your doctor or nurses if you are unsure of what to do.     2. Go to your follow-up appointments. Specific follow-up information is listed in the following pages. Your may be contacted by a nurse or clinical provider about future appointments. Be sure we have all of the phone numbers to reach you. Please contact your provider's office if you are unable to make an appointment.     3. Watch for warning signs. Your doctor or nurse will give you detailed warning signs to watch for and when to call for assistance. These instructions may also include educational information about your condition. If you experience any of warning signs to your health, call your doctor.               ** Verify the list of medication(s) below is accurate and up to date. Carry this with you in case of emergency. If your medications have changed, please notify your healthcare provider.             Medication List      CHANGE how you take these medications        Additional Info                      loratadine 10 mg tablet   Commonly known as:  CLARITIN   Refills:  0   Dose:  10 mg   What changed:  Another medication with the same name was removed. Continue  taking this medication, and follow the directions you see here.    Instructions:  Take 10 mg by mouth once daily.     Begin Date    AM    Noon    PM    Bedtime         CONTINUE taking these medications        Additional Info                      amlodipine 5 MG tablet   Commonly known as:  NORVASC   Refills:  0   Dose:  5 mg    Last time this was given:  5 mg on 3/29/2017  8:34 AM   Instructions:  Take 5 mg by mouth once daily.     Begin Date    AM    Noon    PM    Bedtime       atorvastatin 20 MG tablet   Commonly known as:  LIPITOR   Refills:  0   Dose:  20 mg    Last time this was given:  20 mg on 3/29/2017  8:34 AM   Instructions:  Take 20 mg by mouth once daily.     Begin Date    AM    Noon    PM    Bedtime       colchicine 0.6 mg tablet   Refills:  0   Dose:  0.6 mg    Instructions:  Take 0.6 mg by mouth once daily.     Begin Date    AM    Noon    PM    Bedtime       doxazosin 2 MG tablet   Commonly known as:  CARDURA   Refills:  0   Dose:  2 mg    Last time this was given:  2 mg on 3/29/2017  8:35 AM   Instructions:  Take 2 mg by mouth every evening.     Begin Date    AM    Noon    PM    Bedtime       irbesartan 150 MG tablet   Commonly known as:  AVAPRO   Refills:  0   Dose:  150 mg    Last time this was given:  150 mg on 3/28/2017  9:10 PM   Instructions:  Take 150 mg by mouth every evening.     Begin Date    AM    Noon    PM    Bedtime       LANTUS SUBQ   Refills:  0   Dose:  80 Units    Instructions:  Inject 80 Units into the skin 2 (two) times daily.     Begin Date    AM    Noon    PM    Bedtime       levothyroxine 100 MCG tablet   Commonly known as:  SYNTHROID   Refills:  0   Dose:  100 mcg    Last time this was given:  75 mcg on 3/29/2017  6:25 AM   Instructions:  Take 100 mcg by mouth once daily.     Begin Date    AM    Noon    PM    Bedtime       omeprazole 20 MG capsule   Commonly known as:  PRILOSEC   Refills:  0   Dose:  20 mg    Instructions:  Take 20 mg by mouth once daily.     Begin Date    AM     Noon    PM    Bedtime       sodium bicarbonate 650 MG tablet   Refills:  0   Dose:  650 mg    Last time this was given:  650 mg on 3/29/2017  8:34 AM   Instructions:  Take 650 mg by mouth once daily.     Begin Date    AM    Noon    PM    Bedtime       vilazodone 40 mg Tab tablet   Commonly known as:  VIIBRYD   Refills:  0   Dose:  40 mg    Instructions:  Take 40 mg by mouth once daily.     Begin Date    AM    Noon    PM    Bedtime         STOP taking these medications     allopurinol 100 MG tablet   Commonly known as:  ZYLOPRIM       APIDRA 100 unit/mL injection   Generic drug:  insulin glulisine       benzonatate 200 MG capsule   Commonly known as:  TESSALON       fluticasone 50 mcg/actuation nasal spray   Commonly known as:  FLONASE       hydrocodone-homatropine 5-1.5 mg/5 ml 5-1.5 mg/5 mL Syrp   Commonly known as:  HYCODAN       lisinopril 5 MG tablet   Commonly known as:  PRINIVIL,ZESTRIL       montelukast 10 mg tablet   Commonly known as:  SINGULAIR       mycophenolate 500 mg Tab   Commonly known as:  CELLCEPT       PROAIR HFA 90 mcg/actuation inhaler   Generic drug:  albuterol                  Please bring to all follow up appointments:    1. A copy of your discharge instructions.  2. All medicines you are currently taking in their original bottles.  3. Identification and insurance card.    Please arrive 15 minutes ahead of scheduled appointment time.    Please call 24 hours in advance if you must reschedule your appointment and/or time.        Follow-up Information     Follow up with Diony Alvarado MD On 4/11/2017.    Specialty:  Internal Medicine    Why:  @10:15am     Contact information:    [address] [place] [address] [address] [place] [address]  [phone]          Follow up with Bogdan Garduno MD.    Specialty:  Nephrology    Why:  Have BMP checked in 7 days.    Contact information:    [place] [address] [phone]          Discharge Instructions     Future Orders    Call MD  "for:     Comments:    For worsening symptoms, chest pain, shortness of breath, increased abdominal pain, high grade fever, stroke or stroke like symptoms, immediately go to the nearest Emergency Room or call 911 as soon as possible.    Diet Diabetic 1800 Calories     Diet general     Comments:    Cardiac/ 2 gram sodium low cholesterol diet    Questions:    Total calories:      Fat restriction, if any:      Protein restriction, if any:      Na restriction, if any:      Fluid restriction:      Additional restrictions:      Other restrictions (specify):     Comments:    Fall precautions        Primary Diagnosis     Your primary diagnosis was:  Acute Nontraumatic Kidney Injury      Admission Information     Date & Time Provider Department CSN    3/27/2017  4:16 PM Patrick Jordan MD [facility] [id number]      Care Providers     Provider Role Specialty Primary office phone    Patrick Jordan MD Attending Provider Internal Medicine [phone]    Dayron Oneal MD Consulting Physician  Nephrology  [phone]    Bogdan Garduno MD Consulting Physician  Nephrology [phone]      Your Vitals Were     BP Pulse Temp Resp Height Weight    151/77 81 98.2 °F (36.8 °C) (Oral) 18 5' 7" (1.702 m) 104.3 kg (230 lb)    Last Period SpO2 BMI          02/28/2012 96% 36.02 kg/m2        Recent Lab Values        3/27/2017                           8:48 PM           A1C 8.9 (H)           Comment for A1C at  8:48 PM on 3/27/2017:  According to ADA guidelines, hemoglobin A1C <7.0% represents  optimal control in non-pregnant diabetic patients.  Different  metrics may apply to specific populations.   Standards of Medical Care in Diabetes - 2016.  For the purpose of screening for the presence of diabetes:  <5.7%     Consistent with the absence of diabetes  5.7-6.4%  Consistent with increasing risk for diabetes   (prediabetes)  >or=6.5%  Consistent with diabetes  Currently no consensus exists for use of hemoglobin A1C  for " diagnosis of diabetes for children.        Allergies as of 3/29/2017        Reactions    Codeine Itching    Can take oxycodone and hydrocodone with Benadryl      [facility]     [facility] Nurse Care Line - 24/7 Assistance  Unless otherwise directed by your provider, please contact [facility], our nurse care line that is available for 24/7 assistance.     Registered nurses in the [facility] provide clinical advisement, health education, appointment booking, and other advisory services.  Call for this free service at [phone].        Advance Directives     An advance directive is a document which, in the event you are no longer able to make decisions for yourself, tells your healthcare team what kind of treatment you do or do not want to receive, or who you would like to make those decisions for you.  If you do not currently have an advance directive, [facility] encourages you to create one.  For more information call:  [phone]), [phone] ([phone]),  or log on to [email or web address].        Language Assistance Services     ATTENTION: Language assistance services are available, free of charge. Please call [phone].      ATENCIÓN: Si habla español, tiene a myers disposición servicios gratuitos de asistencia lingüística. Llame al [phone].     CHÚ Ý: N?u b?n nói Ti?ng Vi?t, có các d?ch v? h? tr? ngôn ng? mi?n phí dành cho b?n. G?i s? [phone].        Diabetes Discharge Instructions                                    [facility] complies with applicable Federal civil rights laws and does not discriminate on the basis of race, color, national origin, age, disability, or sex.

## 2017-03-28 LAB
ALBUMIN SERPL BCP-MCNC: 3.3 G/DL
ANION GAP SERPL CALC-SCNC: 8 MMOL/L
BASOPHILS # BLD AUTO: 0 K/UL
BASOPHILS NFR BLD: 0.4 %
BUN SERPL-MCNC: 45 MG/DL
CALCIUM SERPL-MCNC: 8.9 MG/DL
CHLORIDE SERPL-SCNC: 109 MMOL/L
CO2 SERPL-SCNC: 24 MMOL/L
CREAT SERPL-MCNC: 2.3 MG/DL
DIFFERENTIAL METHOD: ABNORMAL
EOSINOPHIL # BLD AUTO: 0.9 K/UL
EOSINOPHIL NFR BLD: 9 %
ERYTHROCYTE [DISTWIDTH] IN BLOOD BY AUTOMATED COUNT: 15.9 %
ERYTHROCYTE [SEDIMENTATION RATE] IN BLOOD BY WESTERGREN METHOD: 38 MM/HR
EST. GFR  (AFRICAN AMERICAN): 26 ML/MIN/1.73 M^2
EST. GFR  (NON AFRICAN AMERICAN): 23 ML/MIN/1.73 M^2
ESTIMATED AVG GLUCOSE: 209 MG/DL
GLUCOSE SERPL-MCNC: 157 MG/DL
HBA1C MFR BLD HPLC: 8.9 %
HCT VFR BLD AUTO: 28.7 %
HGB BLD-MCNC: 9.4 G/DL
LYMPHOCYTES # BLD AUTO: 3.2 K/UL
LYMPHOCYTES NFR BLD: 31.3 %
MAGNESIUM SERPL-MCNC: 1.9 MG/DL
MCH RBC QN AUTO: 28.3 PG
MCHC RBC AUTO-ENTMCNC: 32.6 %
MCV RBC AUTO: 87 FL
MONOCYTES # BLD AUTO: 0.7 K/UL
MONOCYTES NFR BLD: 7 %
NEUTROPHILS # BLD AUTO: 5.4 K/UL
NEUTROPHILS NFR BLD: 52.3 %
PHOSPHATE SERPL-MCNC: 4.6 MG/DL
PHOSPHATE SERPL-MCNC: 4.6 MG/DL
PLATELET # BLD AUTO: 180 K/UL
PMV BLD AUTO: 7.8 FL
POCT GLUCOSE: 139 MG/DL (ref 70–110)
POCT GLUCOSE: 183 MG/DL (ref 70–110)
POCT GLUCOSE: 185 MG/DL (ref 70–110)
POTASSIUM SERPL-SCNC: 4.6 MMOL/L
RBC # BLD AUTO: 3.3 M/UL
SODIUM SERPL-SCNC: 141 MMOL/L
T4 FREE SERPL-MCNC: 1.1 NG/DL
TROPONIN I SERPL DL<=0.01 NG/ML-MCNC: 0.02 NG/ML
TSH SERPL DL<=0.005 MIU/L-ACNC: 0.02 UIU/ML
WBC # BLD AUTO: 10.2 K/UL

## 2017-03-28 PROCEDURE — 25000003 PHARM REV CODE 250: Performed by: NURSE PRACTITIONER

## 2017-03-28 PROCEDURE — 85651 RBC SED RATE NONAUTOMATED: CPT

## 2017-03-28 PROCEDURE — 84100 ASSAY OF PHOSPHORUS: CPT

## 2017-03-28 PROCEDURE — 80069 RENAL FUNCTION PANEL: CPT

## 2017-03-28 PROCEDURE — 94760 N-INVAS EAR/PLS OXIMETRY 1: CPT

## 2017-03-28 PROCEDURE — 25000003 PHARM REV CODE 250: Performed by: INTERNAL MEDICINE

## 2017-03-28 PROCEDURE — G0378 HOSPITAL OBSERVATION PER HR: HCPCS

## 2017-03-28 PROCEDURE — 85025 COMPLETE CBC W/AUTO DIFF WBC: CPT

## 2017-03-28 PROCEDURE — 63600175 PHARM REV CODE 636 W HCPCS: Performed by: EMERGENCY MEDICINE

## 2017-03-28 PROCEDURE — 83735 ASSAY OF MAGNESIUM: CPT

## 2017-03-28 PROCEDURE — 36415 COLL VENOUS BLD VENIPUNCTURE: CPT

## 2017-03-28 PROCEDURE — 84484 ASSAY OF TROPONIN QUANT: CPT

## 2017-03-28 PROCEDURE — 99225 PR SUBSEQUENT OBSERVATION CARE,LEVEL II: CPT | Mod: ,,, | Performed by: INTERNAL MEDICINE

## 2017-03-28 RX ORDER — DOXAZOSIN 1 MG/1
2 TABLET ORAL DAILY
Status: DISCONTINUED | OUTPATIENT
Start: 2017-03-28 | End: 2017-03-29 | Stop reason: HOSPADM

## 2017-03-28 RX ORDER — MYCOPHENOLATE MOFETIL 500 MG/1
500 TABLET ORAL 2 TIMES DAILY
Status: ON HOLD | COMMUNITY
End: 2017-03-29 | Stop reason: HOSPADM

## 2017-03-28 RX ADMIN — INSULIN ASPART 2 UNITS: 100 INJECTION, SOLUTION INTRAVENOUS; SUBCUTANEOUS at 12:03

## 2017-03-28 RX ADMIN — LEVOTHYROXINE SODIUM 75 MCG: 25 TABLET ORAL at 07:03

## 2017-03-28 RX ADMIN — PANTOPRAZOLE SODIUM 40 MG: 40 TABLET, DELAYED RELEASE ORAL at 10:03

## 2017-03-28 RX ADMIN — INSULIN DETEMIR 80 UNITS: 100 INJECTION, SOLUTION SUBCUTANEOUS at 09:03

## 2017-03-28 RX ADMIN — INSULIN DETEMIR 80 UNITS: 100 INJECTION, SOLUTION SUBCUTANEOUS at 10:03

## 2017-03-28 RX ADMIN — DOXAZOSIN MESYLATE 2 MG: 1 TABLET ORAL at 06:03

## 2017-03-28 RX ADMIN — INSULIN ASPART 2 UNITS: 100 INJECTION, SOLUTION INTRAVENOUS; SUBCUTANEOUS at 07:03

## 2017-03-28 RX ADMIN — IRBESARTAN 150 MG: 150 TABLET ORAL at 09:03

## 2017-03-28 RX ADMIN — AMLODIPINE BESYLATE 5 MG: 5 TABLET ORAL at 10:03

## 2017-03-28 RX ADMIN — ATORVASTATIN CALCIUM 20 MG: 20 TABLET, FILM COATED ORAL at 10:03

## 2017-03-28 RX ADMIN — SODIUM BICARBONATE 650 MG TABLET 650 MG: at 10:03

## 2017-03-28 NOTE — PLAN OF CARE
The pt was awake and alert and able to verify all info on the face sheet as correct. She lives at home with her spouse, Any Alvarez [phone]. She has no DME or HH but uses a scooter at home. She sees Dr. Alvarado and has Medicaid insurance. I introduced the pt to the blue discharge folder and she verbalized an understanding. She has no questions or concerns at this time. Anastacia Jose LMSW     03/28/17 1356   Discharge Assessment   Assessment Type Discharge Planning Assessment   Confirmed/corrected address and phone number on facesheet? Yes   Assessment information obtained from? Patient   Communicated expected length of stay with patient/caregiver no   Type of Healthcare Directive Received Durable power of  for health care  (Spouse Any Alvarez [phone])   If Healthcare Directive is received, is it scanned into Epic? no (comment)   Prior to hospitilization cognitive status: Alert/Oriented   Prior to hospitalization functional status: Independent   Current cognitive status: Alert/Oriented   Current Functional Status: Independent   Lives With spouse   Able to Return to Prior Arrangements yes   Is patient able to care for self after discharge? Yes   How many people do you have in your home that can help with your care after discharge? 1   Who are your caregiver(s) and their phone number(s)? Spouse Any Alvarez [phone]   Readmission Within The Last 30 Days no previous admission in last 30 days   Patient currently being followed by outpatient case management? No   Patient currently receives home health services? No   Does the patient currently use HME? No   Patient currently receives private duty nursing? No   Patient currently receives any other outside agency services? No   Equipment Currently Used at Home other (see comments)  (Scooter )   Do you have any problems affording any of your prescribed medications? No  (Harinder Narayanan )   Is the patient taking medications as  prescribed? yes   Do you have any financial concerns preventing you from receiving the healthcare you need? No  (Medicaid insurance )   Does the patient have transportation to healthcare appointments? Yes   Transportation Available car;family or friend will provide   On Dialysis? No   Does the patient receive services at the Coumadin Clinic? No   Are there any open cases? No   Discharge Plan A Home   Discharge Plan B Home with family   Patient/Family In Agreement With Plan yes

## 2017-03-28 NOTE — ASSESSMENT & PLAN NOTE
Cellcept stopped due to current symptoms  Consult Nephrology: Dr. Garduno  Monitor labs  Renal dose medications

## 2017-03-28 NOTE — PLAN OF CARE
Problem: Patient Care Overview  Goal: Individualization & Mutuality  Outcome: Ongoing (interventions implemented as appropriate)  Pt. Resting quietly between care. Pt. States she is comfortable.  Friend visited earlier in shift upon admit and brought her fried  Chicken meal.  Nephrology consult noted.  IVF infusing as ordered. Pt. Ambulating w/o problem.  States in ER she was unsteady due to dehydration.  Also states she falls frequently at home/refuses cane/walker.  No falls/injuries this shift.

## 2017-03-28 NOTE — CONSULTS
Consult Note  Nephrology    Andra Newell  female  57 y.o.  Consult Requested By: Patrick Jordan MD  Reason for Consult: Acute Kidney Injury    SUBJECTIVE:     History of Present Illness:  Ms. Newell is a 56 y/o woman with multiple medical problems including HTN, DM2 and CKD. She is admitted after experiencing weakness, dizziness, fleeting CP and bilateral flank pain. She was diagnosed by renal biopsy with interstitial nephritis and has been treated with Cellcept 500 mg BID for the past 12 days. She had been treated with Bactrim a few months prior to the diagnosis and she has been on Allopurinol for several years. She states she has had eosinophilia for several years as well. No fevers yesterday. She did have some disorientation and does not remember the later activities yesterday. Nephrology consulted for SACHIN/AIN.    Review Of Systems:  GEN:  No fever, chills, night sweats, decreased intake  HEENT: HA. No blurry vision, glasses, floaters, hearing defects, sore throat.  CV:  CP, BOYER. No palpitations, edema, orthopnea, PND.  PULM:  Chronic cough with SOB. No hemoptysis, wheezing.  GI:  Nausea, no vomiting. No constipation, diarrhea, melena, hematochezia, abdominal pain.  :  No dysuria, urgency, frequency, cloudy urine, red urine, dribbling, double voiding, incontinence, hx of stones.  NEURO: Confused? Dizziness. No LOC, seizure activity.  SKIN:  Pruritis. No rash, spots, sores.  MS:  No arthralgias, joint swelling, redness or warmth.    Past Medical History:   Diagnosis Date    Arthritis     Asthma     allergic airway    Depression     Diabetes mellitus     Gout     Hypertension     Renal disorder     Thyroid disease      Past Surgical History:   Procedure Laterality Date    ACHILLES TENDON SURGERY Left May 2015    BACK SURGERY      CHOLECYSTECTOMY      HEMORRHOID SURGERY      HIP SURGERY      JOINT REPLACEMENT      left    KNEE SURGERY      SHOULDER ARTHROSCOPY      SHOULDER SURGERY        Family History   Problem Relation Age of Onset    Diabetes Mother     Diabetes Sister     Lupus Sister     Heart disease Brother      Social History   Substance Use Topics    Smoking status: Never Smoker    Smokeless tobacco: Never Used    Alcohol use No      Review of patient's allergies indicates:   Allergen Reactions    Codeine Itching     Can take oxycodone and hydrocodone with Benadryl      Prior to Admission medications    Medication Sig Start Date End Date Taking? Authorizing Provider   allopurinol (ZYLOPRIM) 100 MG tablet Take 100 mg by mouth once daily.   Yes Historical Provider, MD   amlodipine (NORVASC) 5 MG tablet Take 5 mg by mouth once daily.   Yes Historical Provider, MD   atorvastatin (LIPITOR) 20 MG tablet Take 20 mg by mouth once daily.   Yes Historical Provider, MD   colchicine 0.6 mg tablet Take 0.6 mg by mouth once daily.   Yes Historical Provider, MD   doxazosin (CARDURA) 2 MG tablet Take 2 mg by mouth every evening.   Yes Historical Provider, MD   INSULIN GLARGINE,HUM.REC.ANLOG (LANTUS SUBQ) Inject 80 Units into the skin 2 (two) times daily.    Yes Historical Provider, MD   irbesartan (AVAPRO) 150 MG tablet Take 150 mg by mouth every evening.   Yes Historical Provider, MD   levothyroxine (SYNTHROID) 100 MCG tablet Take 100 mcg by mouth once daily.   Yes Historical Provider, MD   loratadine (CLARITIN) 10 mg tablet Take 10 mg by mouth once daily.   Yes Historical Provider, MD   omeprazole (PRILOSEC) 20 MG capsule Take 20 mg by mouth once daily.   Yes Historical Provider, MD   sodium bicarbonate 650 MG tablet Take 650 mg by mouth once daily.   Yes Historical Provider, MD   vilazodone (VIIBRYD) 40 mg Tab tablet Take 40 mg by mouth once daily.   Yes Historical Provider, MD           sodium chloride 0.9% 125 mL/hr at 03/27/17 9549       allopurinol  100 mg Oral Daily    amlodipine  5 mg Oral Daily    atorvastatin  20 mg Oral Daily    doxazosin  2 mg Oral QHS    insulin detemir  80  Units Subcutaneous BID    irbesartan  150 mg Oral QHS    [START ON 3/29/2017] levothyroxine  75 mcg Oral Before breakfast    pantoprazole  40 mg Oral Daily    sodium bicarbonate  650 mg Oral Daily    vilazodone  40 mg Oral Daily     acetaminophen, dextrose 50%, dextrose 50%, glucagon (human recombinant), glucose, glucose, insulin aspart, ondansetron, ramelteon, senna-docusate 8.6-50 mg       OBJECTIVE:     Vital Signs (Most Recent)  Temp: 98.1 °F (36.7 °C) (03/28/17 0810)  Pulse: 80 (03/28/17 0826)  Resp: 18 (03/28/17 0826)  BP: (!) 151/85 (03/28/17 0810)  SpO2: 96 % (03/28/17 0826)    Vital Signs Range (Last 24H):  Temp:  [98.1 °F (36.7 °C)-98.6 °F (37 °C)]   Pulse:  []   Resp:  [10-20]   BP: (142-178)/(68-86)   SpO2:  [96 %-98 %]     Physical Exam:  General: Awake and alert. In no distress.  HEENT: No scleral icterus, MM moist.   NECK:  No JVD. Supple,  No swelling, No masses.  CV:  RRR without murmurs, rubs, gallops.  PULM:  Clear without crackles, rhonchi, wheezes.  ABD:  Soft, nl BS, NT, ND.  EXTR:  No edema, clubbing, cyanosis.   NEURO: Grossly Intact.  SKIN:  No rashes, lesions.   MS:  No joint effusions.   PSYCH: Normal Mood.    Laboratory:    Recent Labs  Lab 03/23/17  0914 03/27/17  1650 03/27/17  1831 03/28/17  0222 03/28/17  0224    142  --  141  --    K 4.8 5.1  --  4.6  --     108  --  109  --    CO2 20* 24  --  24  --    BUN 47* 43*  --  45*  --    CREATININE 2.3* 2.5*  --  2.3*  --    * 135*  --  157*  --    CALCIUM 9.5 9.4  --  8.9  --    PHOS 4.5  --   --  4.6* 4.6*   CPK  --   --  66  --   --          Recent Labs  Lab 03/27/17  1650 03/28/17  0222   WBC 10.60 10.20   HGB 10.1* 9.4*   HCT 31.1* 28.7*    180     ..  Urinalysis    Recent Labs  Lab 03/27/17  1702   COLORU Yellow   SPECGRAV 1.015   PHUR 5.0   PROTEINUA 2+*   BACTERIA None   NITRITE Negative   LEUKOCYTESUR Negative   UROBILINOGEN Negative   HYALINECASTS 0       Active Hospital Problems    Diagnosis   POA    *SACHIN (acute kidney injury) vs. worsening interstitial nephritis [N17.9]  Yes    Chest pain [R07.9]  Yes    Interstitial nephritis [N12]  Yes    Acute bilateral low back pain [M54.5]  Yes    Hypothyroidism [E03.9]  Yes    Essential hypertension [I10]  Yes    Depression [F32.9]  Yes    Diabetes mellitus type 2, uncontrolled [E11.65]  Yes      Resolved Hospital Problems    Diagnosis Date Resolved POA   No resolved problems to display.       ASSESSMENT/PLAN:     1. SACHIN on CKD. Suspect some IVVD. Continue with IVF's.  2. Interstitial nephritis with eosinophilia. Concerned this could be DRESS despite no actual rash. She has been on Allopurinol for several years and says she has had eosinophilia over several years as well. I held allopurinol and would not restart.  3. Anemia. Near goal. No thrombocytopenia to suggest hemolytic process.  4. HTN. Acceptable control.  5. Disposition. I think she could go home tomorrow if chems stable.    Thanks for the consult. Will follow up.    Bogdan Garduno M.D.

## 2017-03-28 NOTE — PLAN OF CARE
03/28/17 0826   PRE-TX-O2-ETCO2   O2 Device (Oxygen Therapy) room air   SpO2 96 %   Pulse Oximetry Type Intermittent   $ Pulse Oximetry - Single Charge Pulse Oximetry - Single   Pulse 80   Resp 18       Patient resting comfortably in bed with no distress noted.

## 2017-03-28 NOTE — PROGRESS NOTES
Progress Note  Hospital Medicine  Patient Name:Andra Newell  MRN:  [id number]  Patient Class: OP- Observation  Admit Date: 3/27/2017  Length of Stay: 0 days  Expected Discharge Date:   Attending Physician: Patrick Jordan MD  Primary Care Provider:  GHULAM Ling    SUBJECTIVE:     Principal Problem: SACHIN (acute kidney injury)  Initial history of present illness: Ms. Newell is a 58yo WF with a [facility] HTN, DM2, Hypothyroidism, Depression, and CKD.  is her nephrologist and she was recently diagnosed with interstitial nephritis. She presents to the hospital today with c/o CP, HA, SOB, fatigue, nausea, and low back. The back pain started while she was an appointment at her PCP. It is on both sides of her low back and continuous. Nothing made it worst or better. The chest pain is sharp, left sided CP, and has been intermittent all day today. Her HA is occipital and started gradually after taking a nap. Nothing made it worst or better. Her nephrologist recommended stopping Cellcept and giving IVF. She has been on Cellcept for interstitial nephritis, and today is day 12 of 30. She is unable to take steroids due to her uncontrolled DM. She is currently still having the low back pain, it is better. Her appetite is good, but she does not drink enough water.     PMH/PSH/SH/FH/Meds: reviewed.    Symptoms/Review of Systems: Feeling better. No shortness of breath, cough, chest pain or headache, fever or abdominal pain. Having lower back pain.  Diet:  Adequate intake.    Activity level: Normal.    Pain:  Patient reports no pain.       OBJECTIVE:   Vital Signs (Most Recent):      Temp: 98.3 °F (36.8 °C) (03/28/17 0429)  Pulse: 81 (03/28/17 0429)  Resp: 18 (03/28/17 0429)  BP: (!) 144/86 (03/28/17 0429)  SpO2: 96 % (03/28/17 0429)       Vital Signs Range (Last 24H):  Temp:  [98.3 °F (36.8 °C)-98.6 °F (37 °C)]   Pulse:  []   Resp:  [10-20]   BP: (142-178)/(68-86)   SpO2:  [96 %-98 %]     Weight: 104.3 kg (230  lb)  Body mass index is 36.02 kg/(m^2).    Intake/Output Summary (Last 24 hours) at 03/28/17 0638  Last data filed at 03/28/17 0400   Gross per 24 hour   Intake              210 ml   Output                0 ml   Net              210 ml     Physical Examination:  Constitutional: She is oriented to person, place, and time. She appears well-developed and well-nourished. No distress.   HENT:   Head: Normocephalic.   Eyes: Pupils are equal, round, and reactive to light.   Neck: Normal range of motion. Neck supple. No JVD present. No tracheal deviation present.   Cardiovascular: Normal rate, regular rhythm, normal heart sounds and intact distal pulses.   No murmur heard.  Pulmonary/Chest: Effort normal and breath sounds normal. No respiratory distress.   Abdominal: Soft. Bowel sounds are normal. She exhibits no distension. There is no tenderness.   Musculoskeletal: Normal range of motion. She exhibits no edema.   Neurological: She is alert and oriented to person, place, and time. No cranial nerve deficit.   Skin: Skin is warm, dry and intact.   Psychiatric: She has a normal mood and affect. Her behavior is normal. Judgment and thought content normal.     CBC:    Recent Labs  Lab 03/27/17  1650 03/28/17 0222   WBC 10.60 10.20   RBC 3.58* 3.30*   HGB 10.1* 9.4*   HCT 31.1* 28.7*    180   MCV 87 87   MCH 28.1 28.3   MCHC 32.3 32.6   BMP    Recent Labs  Lab 03/23/17  0914 03/27/17  1650 03/28/17 0222 03/28/17 0224   * 135* 157*  --     142 141  --    K 4.8 5.1 4.6  --     108 109  --    CO2 20* 24 24  --    BUN 47* 43* 45*  --    CREATININE 2.3* 2.5* 2.3*  --    CALCIUM 9.5 9.4 8.9  --    MG 2.1  --   --  1.9      Diagnostic Results:  Microbiology Results (last 7 days)     ** No results found for the last 168 hours. **       CXR: No acute cardiopulmonary disease.    Assessment/Plan:   * SACHIN (acute kidney injury) vs. worsening interstitial nephritis  IVF Hydration  Monitor labs  Renal dose  medications     Interstitial nephritis  Cellcept stopped due to current symptoms  Follow Dr. Garduno recommendations.   Monitor labs  Renal dose medications     Chest pain - rued out ACS  Troponin: 0.023, 0.016, 0.027  Continue trending troponin  EKG: NSR, No ectopy  Monitor on telemetry     Essential hypertension  Controlled/ Chronic. Continue home BP medications, adjusting as needed     Acute bilateral low back pain  Check uric acid and inflammatory markers  Consider Xray if pain persists  APAP prn pain     Diabetes mellitus type 2, uncontrolled  Continue home insulin  Monitor blood sugars  SSI  Diabetic diet     Hypothyroidism  TSH is low; reduce levothyroxine dose from 100 to 75 mcg daily.     Depression  Continue home Viibryd.    OOB and increase activity.  VTE Risk Mitigation         Ordered     Medium Risk of VTE  Once      03/27/17 2025     Place EDUARDO hose  Until discontinued      03/27/17 2025        Partick Jordan MD  Department of Hospital Medicine   [facility]

## 2017-03-28 NOTE — H&P
[facility] Hospital Medicine  History & Physical    Patient Name: Andra Newell  MRN: [id number]  Admission Date: 3/27/2017  Attending Physician: Patrick Jordan MD   Primary Care Provider: GHULAM Ling         Patient information was obtained from patient and ER records.     Subjective:     Principal Problem:SACHIN (acute kidney injury)    Chief Complaint:   Chief Complaint   Patient presents with    Fatigue    Flank Pain     bilat    Headache        HPI: Ms. Newell is a 56yo WF with a [facility] HTN, DM2, Hypothyroidism, Depression, and CKD.  is her nephrologist and she was recently diagnosed with interstitial nephritis. She presents to the hospital today with c/o CP, HA, SOB, fatigue, nausea, and low back. The back pain started while she was an appointment at her PCP. It is on both sides of her low back and continuous. Nothing made it worst or better. The chest pain is sharp, left sided CP, and has been intermittent all day today. Her HA is occipital and started gradually after taking a nap. Nothing made it worst or better. Her nephrologist recommended stopping Cellcept and giving IVF. She has been on Cellcept for interstitial nephritis, and today is day 12 of 30. She is unable to take steroids due to her uncontrolled DM. She is currently still having the low back pain, it is better. Her appetite is good, but she does not drink enough water.     Past Medical History:   Diagnosis Date    Arthritis     Asthma     allergic airway    Depression     Diabetes mellitus     Gout     Hypertension     Renal disorder     Thyroid disease        Past Surgical History:   Procedure Laterality Date    ACHILLES TENDON SURGERY Left May 2015    BACK SURGERY      CHOLECYSTECTOMY      HEMORRHOID SURGERY      HIP SURGERY      JOINT REPLACEMENT      left    KNEE SURGERY      SHOULDER ARTHROSCOPY      SHOULDER SURGERY         Review of patient's allergies indicates:   Allergen Reactions     Codeine Itching     Can take oxycodone and hydrocodone with Benadryl       No current facility-administered medications on file prior to encounter.      Current Outpatient Prescriptions on File Prior to Encounter   Medication Sig    allopurinol (ZYLOPRIM) 100 MG tablet Take 100 mg by mouth once daily.    amlodipine (NORVASC) 5 MG tablet Take 5 mg by mouth once daily.    atorvastatin (LIPITOR) 20 MG tablet Take 20 mg by mouth once daily.    doxazosin (CARDURA) 2 MG tablet Take 2 mg by mouth every evening.    INSULIN GLARGINE,HUM.REC.ANLOG (LANTUS SUBQ) Inject 80 Units into the skin 2 (two) times daily.     irbesartan (AVAPRO) 150 MG tablet Take 150 mg by mouth every evening.    levothyroxine (SYNTHROID) 100 MCG tablet Take 100 mcg by mouth once daily.    omeprazole (PRILOSEC) 20 MG capsule Take 20 mg by mouth once daily.    [DISCONTINUED] albuterol (PROAIR HFA) 90 mcg/actuation inhaler Inhale 2 puffs into the lungs every 6 (six) hours as needed for Wheezing.    [DISCONTINUED] benzonatate (TESSALON) 200 MG capsule Take 200 mg by mouth 3 (three) times daily as needed for Cough.    [DISCONTINUED] fluticasone (FLONASE) 50 mcg/actuation nasal spray 1 spray by Each Nare route 2 (two) times daily as needed for Rhinitis.    [DISCONTINUED] gabapentin (NEURONTIN) 600 MG tablet Take 1 tablet (600 mg total) by mouth 3 (three) times daily.    [DISCONTINUED] hydrocodone-homatropine 5-1.5 mg/5 ml (HYCODAN) 5-1.5 mg/5 mL Syrp Take 5 mLs by mouth every 4 (four) hours as needed.    [DISCONTINUED] insulin glulisine (APIDRA) 100 unit/mL injection Inject 2 Units into the skin 3 (three) times daily after meals.    [DISCONTINUED] lisinopril (PRINIVIL,ZESTRIL) 5 MG tablet Take 5 mg by mouth once daily.    [DISCONTINUED] loratadine (CLARITIN) 10 mg tablet Take 1 tablet (10 mg total) by mouth once daily.    [DISCONTINUED] lorazepam (ATIVAN) 2 MG Tab Take 0.5 mg by mouth every 6 (six) hours as needed.    [DISCONTINUED]  montelukast (SINGULAIR) 10 mg tablet Take 10 mg by mouth every evening.    [DISCONTINUED] vilazodone (VIIBRYD) 20 mg Tab Take by mouth.     Family History     Problem Relation (Age of Onset)    Diabetes Mother, Sister    Heart disease Brother    Lupus Sister        Social History Main Topics    Smoking status: Never Smoker    Smokeless tobacco: Never Used    Alcohol use No    Drug use: No    Sexual activity: No     Review of Systems   Constitutional: Positive for fatigue. Negative for appetite change and fever.   HENT: Negative for congestion and sinus pressure.    Eyes: Negative for visual disturbance.   Respiratory: Positive for shortness of breath. Negative for cough.    Cardiovascular: Positive for chest pain. Negative for palpitations and leg swelling.   Gastrointestinal: Positive for nausea. Negative for abdominal pain, diarrhea and vomiting.   Genitourinary: Negative for dysuria.        Sometimes she urinates normally, and sometimes not at all   Musculoskeletal: Positive for back pain. Negative for myalgias.   Skin: Negative for wound.   Neurological: Positive for light-headedness and headaches. Negative for seizures and syncope.   Hematological: Does not bruise/bleed easily.   Psychiatric/Behavioral: Negative for confusion. The patient is not hyperactive.      Objective:     Vital Signs (Most Recent):  Temp: 98.3 °F (36.8 °C) (03/27/17 1559)  Pulse: 82 (03/27/17 1933)  Resp: 10 (03/27/17 1559)  BP: (!) 178/82 (03/27/17 1933)  SpO2: 97 % (03/27/17 1933) Vital Signs (24h Range):  Temp:  [98.3 °F (36.8 °C)] 98.3 °F (36.8 °C)  Pulse:  [] 82  Resp:  [10] 10  SpO2:  [97 %-98 %] 97 %  BP: (142-178)/(68-82) 178/82     Weight: 104.3 kg (230 lb)  Body mass index is 36.02 kg/(m^2).    Physical Exam   Constitutional: She is oriented to person, place, and time. She appears well-developed and well-nourished. No distress.   HENT:   Head: Normocephalic.   Eyes: Pupils are equal, round, and reactive to light.    Neck: Normal range of motion. Neck supple. No JVD present. No tracheal deviation present.   Cardiovascular: Normal rate, regular rhythm, normal heart sounds and intact distal pulses.    No murmur heard.  Pulmonary/Chest: Effort normal and breath sounds normal. No respiratory distress.   Abdominal: Soft. Bowel sounds are normal. She exhibits no distension. There is no tenderness.   Musculoskeletal: Normal range of motion. She exhibits no edema.   Neurological: She is alert and oriented to person, place, and time. No cranial nerve deficit.   Skin: Skin is warm, dry and intact.   Psychiatric: She has a normal mood and affect. Her behavior is normal. Judgment and thought content normal.        Significant Labs:   CBC:   Recent Labs  Lab 03/27/17  1650   WBC 10.60   HGB 10.1*   HCT 31.1*        CMP:   Recent Labs  Lab 03/27/17  1650      K 5.1      CO2 24   *   BUN 43*   CREATININE 2.5*   CALCIUM 9.4   PROT 7.1   ALBUMIN 3.7   BILITOT 0.4   ALKPHOS 183*   AST 19   ALT 23   ANIONGAP 10   EGFRNONAA 20*     Cardiac Markers:   Recent Labs  Lab 03/27/17  1650   BNP <10     Coagulation:   Recent Labs  Lab 03/27/17  1650   INR 1.1     Troponin:   Recent Labs  Lab 03/27/17  1650 03/27/17  1831 03/27/17  2048   TROPONINI 0.023 0.016 0.027*       Urine Studies:   Recent Labs  Lab 03/27/17  1702   COLORU Yellow   APPEARANCEUA Clear   PHUR 5.0   SPECGRAV 1.015   PROTEINUA 2+*   GLUCUA Negative   KETONESU Negative   BILIRUBINUA Negative   OCCULTUA 1+*   NITRITE Negative   UROBILINOGEN Negative   LEUKOCYTESUR Negative   RBCUA 1   WBCUA 0   BACTERIA None   SQUAMEPITHEL 4   HYALINECASTS 0          Significant Imaging:     CXR:  Reviewed film, looks ok.    Assessment/Plan:     * SACHIN (acute kidney injury) vs. worsening interstitial nephritis  IVF Hydration  Monitor labs  Renal dose medications        Interstitial nephritis  Cellcept stopped due to current symptoms  Consult Nephrology: Dr. Garduno  Monitor  labs  Renal dose medications      Chest pain  Troponin: 0.023, 0.016, 0.027  Continue trending troponin  EKG: NSR, No ectopy  Monitor on telemetry  Consider cardiology consult if troponin (+) or symptoms recur      Essential hypertension  Controlled/ Chronic. Continue home BP medications, adjusting as needed      Acute bilateral low back pain  Check uric acid and inflammatory markers  Consider Xray if pain persists  APAP prn pain        Diabetes mellitus type 2, uncontrolled  Continue home insulin  Monitor blood sugars  SSI  Diabetic diet      Hypothyroidism  Chronic. Continue home levothyroxine  Check TSH      Depression  Continue home Viibryd        VTE Risk Mitigation         Ordered     Medium Risk of VTE  Once      03/27/17 2025     Place EUDARDO hose  Until discontinued      03/27/17 2025        Adenike Patrick NP  Department of Hospital Medicine   [facility]

## 2017-03-28 NOTE — SUBJECTIVE & OBJECTIVE
Past Medical History:   Diagnosis Date    Arthritis     Asthma     allergic airway    Depression     Diabetes mellitus     Gout     Hypertension     Renal disorder     Thyroid disease        Past Surgical History:   Procedure Laterality Date    ACHILLES TENDON SURGERY Left May 2015    BACK SURGERY      CHOLECYSTECTOMY      HEMORRHOID SURGERY      HIP SURGERY      JOINT REPLACEMENT      left    KNEE SURGERY      SHOULDER ARTHROSCOPY      SHOULDER SURGERY         Review of patient's allergies indicates:   Allergen Reactions    Codeine Itching     Can take oxycodone and hydrocodone with Benadryl       No current facility-administered medications on file prior to encounter.      Current Outpatient Prescriptions on File Prior to Encounter   Medication Sig    allopurinol (ZYLOPRIM) 100 MG tablet Take 100 mg by mouth once daily.    amlodipine (NORVASC) 5 MG tablet Take 5 mg by mouth once daily.    atorvastatin (LIPITOR) 20 MG tablet Take 20 mg by mouth once daily.    doxazosin (CARDURA) 2 MG tablet Take 2 mg by mouth every evening.    INSULIN GLARGINE,HUM.REC.ANLOG (LANTUS SUBQ) Inject 80 Units into the skin 2 (two) times daily.     irbesartan (AVAPRO) 150 MG tablet Take 150 mg by mouth every evening.    levothyroxine (SYNTHROID) 100 MCG tablet Take 100 mcg by mouth once daily.    omeprazole (PRILOSEC) 20 MG capsule Take 20 mg by mouth once daily.    [DISCONTINUED] albuterol (PROAIR HFA) 90 mcg/actuation inhaler Inhale 2 puffs into the lungs every 6 (six) hours as needed for Wheezing.    [DISCONTINUED] benzonatate (TESSALON) 200 MG capsule Take 200 mg by mouth 3 (three) times daily as needed for Cough.    [DISCONTINUED] fluticasone (FLONASE) 50 mcg/actuation nasal spray 1 spray by Each Nare route 2 (two) times daily as needed for Rhinitis.    [DISCONTINUED] gabapentin (NEURONTIN) 600 MG tablet Take 1 tablet (600 mg total) by mouth 3 (three) times daily.    [DISCONTINUED]  hydrocodone-homatropine 5-1.5 mg/5 ml (HYCODAN) 5-1.5 mg/5 mL Syrp Take 5 mLs by mouth every 4 (four) hours as needed.    [DISCONTINUED] insulin glulisine (APIDRA) 100 unit/mL injection Inject 2 Units into the skin 3 (three) times daily after meals.    [DISCONTINUED] lisinopril (PRINIVIL,ZESTRIL) 5 MG tablet Take 5 mg by mouth once daily.    [DISCONTINUED] loratadine (CLARITIN) 10 mg tablet Take 1 tablet (10 mg total) by mouth once daily.    [DISCONTINUED] lorazepam (ATIVAN) 2 MG Tab Take 0.5 mg by mouth every 6 (six) hours as needed.    [DISCONTINUED] montelukast (SINGULAIR) 10 mg tablet Take 10 mg by mouth every evening.    [DISCONTINUED] vilazodone (VIIBRYD) 20 mg Tab Take by mouth.     Family History     Problem Relation (Age of Onset)    Diabetes Mother, Sister    Heart disease Brother    Lupus Sister        Social History Main Topics    Smoking status: Never Smoker    Smokeless tobacco: Never Used    Alcohol use No    Drug use: No    Sexual activity: No     Review of Systems   Constitutional: Positive for fatigue. Negative for appetite change and fever.   HENT: Negative for congestion and sinus pressure.    Eyes: Negative for visual disturbance.   Respiratory: Positive for shortness of breath. Negative for cough.    Cardiovascular: Positive for chest pain. Negative for palpitations and leg swelling.   Gastrointestinal: Positive for nausea. Negative for abdominal pain, diarrhea and vomiting.   Genitourinary: Negative for dysuria.        Sometimes she urinates normally, and sometimes not at all   Musculoskeletal: Positive for back pain. Negative for myalgias.   Skin: Negative for wound.   Neurological: Positive for light-headedness and headaches. Negative for seizures and syncope.   Hematological: Does not bruise/bleed easily.   Psychiatric/Behavioral: Negative for confusion. The patient is not hyperactive.      Objective:     Vital Signs (Most Recent):  Temp: 98.3 °F (36.8 °C) (03/27/17 1559)  Pulse:  82 (03/27/17 1933)  Resp: 10 (03/27/17 1559)  BP: (!) 178/82 (03/27/17 1933)  SpO2: 97 % (03/27/17 1933) Vital Signs (24h Range):  Temp:  [98.3 °F (36.8 °C)] 98.3 °F (36.8 °C)  Pulse:  [] 82  Resp:  [10] 10  SpO2:  [97 %-98 %] 97 %  BP: (142-178)/(68-82) 178/82     Weight: 104.3 kg (230 lb)  Body mass index is 36.02 kg/(m^2).    Physical Exam   Constitutional: She is oriented to person, place, and time. She appears well-developed and well-nourished. No distress.   HENT:   Head: Normocephalic.   Eyes: Pupils are equal, round, and reactive to light.   Neck: Normal range of motion. Neck supple. No JVD present. No tracheal deviation present.   Cardiovascular: Normal rate, regular rhythm, normal heart sounds and intact distal pulses.    No murmur heard.  Pulmonary/Chest: Effort normal and breath sounds normal. No respiratory distress.   Abdominal: Soft. Bowel sounds are normal. She exhibits no distension. There is no tenderness.   Musculoskeletal: Normal range of motion. She exhibits no edema.   Neurological: She is alert and oriented to person, place, and time. No cranial nerve deficit.   Skin: Skin is warm, dry and intact.   Psychiatric: She has a normal mood and affect. Her behavior is normal. Judgment and thought content normal.        Significant Labs:   CBC:   Recent Labs  Lab 03/27/17  1650   WBC 10.60   HGB 10.1*   HCT 31.1*        CMP:   Recent Labs  Lab 03/27/17  1650      K 5.1      CO2 24   *   BUN 43*   CREATININE 2.5*   CALCIUM 9.4   PROT 7.1   ALBUMIN 3.7   BILITOT 0.4   ALKPHOS 183*   AST 19   ALT 23   ANIONGAP 10   EGFRNONAA 20*     Cardiac Markers:   Recent Labs  Lab 03/27/17  1650   BNP <10     Coagulation:   Recent Labs  Lab 03/27/17  1650   INR 1.1     Troponin:   Recent Labs  Lab 03/27/17  1650 03/27/17  1831 03/27/17  2048   TROPONINI 0.023 0.016 0.027*       Recent Labs  Lab 03/27/17  1831 03/27/17 2048   CPK 66  --    TROPONINI 0.016 0.027*       Urine Studies:    Recent Labs  Lab 03/27/17  1702   COLORU Yellow   APPEARANCEUA Clear   PHUR 5.0   SPECGRAV 1.015   PROTEINUA 2+*   GLUCUA Negative   KETONESU Negative   BILIRUBINUA Negative   OCCULTUA 1+*   NITRITE Negative   UROBILINOGEN Negative   LEUKOCYTESUR Negative   RBCUA 1   WBCUA 0   BACTERIA None   SQUAMEPITHEL 4   HYALINECASTS 0          Significant Imaging:     CXR:  Reviewed film, looks ok.

## 2017-03-28 NOTE — ASSESSMENT & PLAN NOTE
Troponin: 0.023, 0.016, 0.027  Continue trending troponin  EKG: NSR, No ectopy  Monitor on telemetry  Consider cardiology consult if troponin (+) or symptoms recur

## 2017-03-28 NOTE — ED NOTES
Pt resting comfortably in bed, no needs at this time. Neuro status intact. Will continue to monitor.

## 2017-03-29 VITALS
HEIGHT: 67 IN | SYSTOLIC BLOOD PRESSURE: 151 MMHG | RESPIRATION RATE: 18 BRPM | BODY MASS INDEX: 36.1 KG/M2 | HEART RATE: 81 BPM | WEIGHT: 230 LBS | TEMPERATURE: 98 F | DIASTOLIC BLOOD PRESSURE: 77 MMHG | OXYGEN SATURATION: 96 %

## 2017-03-29 LAB
ALBUMIN SERPL BCP-MCNC: 3.3 G/DL
ANION GAP SERPL CALC-SCNC: 8 MMOL/L
BASOPHILS # BLD AUTO: 0 K/UL
BASOPHILS NFR BLD: 0.5 %
BUN SERPL-MCNC: 44 MG/DL
CALCIUM SERPL-MCNC: 9.5 MG/DL
CHLORIDE SERPL-SCNC: 112 MMOL/L
CO2 SERPL-SCNC: 22 MMOL/L
CREAT SERPL-MCNC: 2 MG/DL
DIFFERENTIAL METHOD: ABNORMAL
EOSINOPHIL # BLD AUTO: 0.9 K/UL
EOSINOPHIL NFR BLD: 10.5 %
ERYTHROCYTE [DISTWIDTH] IN BLOOD BY AUTOMATED COUNT: 15.5 %
EST. GFR  (AFRICAN AMERICAN): 31 ML/MIN/1.73 M^2
EST. GFR  (NON AFRICAN AMERICAN): 27 ML/MIN/1.73 M^2
GLUCOSE SERPL-MCNC: 132 MG/DL
HCT VFR BLD AUTO: 28.7 %
HGB BLD-MCNC: 9.2 G/DL
LYMPHOCYTES # BLD AUTO: 2.7 K/UL
LYMPHOCYTES NFR BLD: 29.8 %
MCH RBC QN AUTO: 27.6 PG
MCHC RBC AUTO-ENTMCNC: 31.9 %
MCV RBC AUTO: 87 FL
MONOCYTES # BLD AUTO: 0.6 K/UL
MONOCYTES NFR BLD: 7 %
NEUTROPHILS # BLD AUTO: 4.7 K/UL
NEUTROPHILS NFR BLD: 52.2 %
PHOSPHATE SERPL-MCNC: 4.9 MG/DL
PLATELET # BLD AUTO: 179 K/UL
PMV BLD AUTO: 8.2 FL
POCT GLUCOSE: 139 MG/DL (ref 70–110)
POTASSIUM SERPL-SCNC: 4.8 MMOL/L
RBC # BLD AUTO: 3.32 M/UL
SODIUM SERPL-SCNC: 142 MMOL/L
WBC # BLD AUTO: 9 K/UL

## 2017-03-29 PROCEDURE — 80069 RENAL FUNCTION PANEL: CPT

## 2017-03-29 PROCEDURE — 99225 PR SUBSEQUENT OBSERVATION CARE,LEVEL II: CPT | Mod: ,,, | Performed by: INTERNAL MEDICINE

## 2017-03-29 PROCEDURE — 25000003 PHARM REV CODE 250: Performed by: INTERNAL MEDICINE

## 2017-03-29 PROCEDURE — 36415 COLL VENOUS BLD VENIPUNCTURE: CPT

## 2017-03-29 PROCEDURE — 25000003 PHARM REV CODE 250: Performed by: NURSE PRACTITIONER

## 2017-03-29 PROCEDURE — G0378 HOSPITAL OBSERVATION PER HR: HCPCS

## 2017-03-29 PROCEDURE — 85025 COMPLETE CBC W/AUTO DIFF WBC: CPT

## 2017-03-29 RX ADMIN — AMLODIPINE BESYLATE 5 MG: 5 TABLET ORAL at 08:03

## 2017-03-29 RX ADMIN — DOXAZOSIN MESYLATE 2 MG: 1 TABLET ORAL at 08:03

## 2017-03-29 RX ADMIN — PANTOPRAZOLE SODIUM 40 MG: 40 TABLET, DELAYED RELEASE ORAL at 08:03

## 2017-03-29 RX ADMIN — LEVOTHYROXINE SODIUM 75 MCG: 25 TABLET ORAL at 06:03

## 2017-03-29 RX ADMIN — SODIUM BICARBONATE 650 MG TABLET 650 MG: at 08:03

## 2017-03-29 RX ADMIN — INSULIN DETEMIR 50 UNITS: 100 INJECTION, SOLUTION SUBCUTANEOUS at 08:03

## 2017-03-29 RX ADMIN — ATORVASTATIN CALCIUM 20 MG: 20 TABLET, FILM COATED ORAL at 08:03

## 2017-03-29 NOTE — PLAN OF CARE
Problem: Patient Care Overview  Goal: Plan of Care Review  Outcome: Ongoing (interventions implemented as appropriate)  POC reviewed with pt, pt verbalized understanding. Safety maintained throughout shift, bed locked and in lowest position, call light in reach, Side rails up X 2. Non skid sock on when OOB. Pt remained free of fall/trauma. Pt self reports of pain treated with PO pain medication as ordered by MD. Pt ambulating to bathroom with assistance. Pt afebrile, BP elevated.  contacted about increase in pt's blood pressure, new orders given for pt home medication Cardura 2mg,Po daily, to be taken now and daily at 0900. Pt tolerating meals wells, no reports of nausea and vomiting. IVF infused as ordered by MD. POCT glucose checked, coverage needed, See MAR. Telemetry placed.  Will continue to monitor.

## 2017-03-29 NOTE — NURSING
Pt was discharge to home per Md orders, pt received discharge instructions, prescriptions, and education handouts, pt IV was discontinued pt tolerated, pt VSS and noted, pt is aware of her follow up appt with Dr. Garduno pt verb understanding, nad noted will cont to monitor. Pt has all personal belongings, nad noted will cont to monitor

## 2017-03-29 NOTE — PROGRESS NOTES
Progress Note  Nephrology    Admit Date: 3/27/2017   LOS: 0 days     SUBJECTIVE:     Follow-up For:  SACHIN    Feeling better. Only cough persists.    Review of Systems:  GENERAL: NO fever, chills, night sweats, decreased intake  HEENT: NO blurry vision, glasses, floaters, hearing defects, sore throat  CV:  NO CP, Palpitations, Edema, Arrhythmias  PULM:  Cough. No SOB, BOYER, Hemoptysis, PND, Orthopnea  GI:  NO Nausea, Vomiting, Diarrhea, BRBPR, Melena, Abdominal Pain  :  NO flank Pain, Dysuria, Frequency, Urgency, Hematuria  NEURO: NO LOC, Seizure activity, Dizziness  SKIN:  NO Rash, Pruritis, Petechiae  MS:  NO Arthralgia, Muscle Aches, Arthritis      OBJECTIVE:     Vital Signs (Most Recent)  Temp: 98.2 °F (36.8 °C) (03/29/17 0800)  Pulse: 81 (03/29/17 0800)  Resp: 18 (03/29/17 0800)  BP: (!) 151/77 (03/29/17 0800)  SpO2: 96 % (03/29/17 0800)    Vital Signs Range (Last 24H):  Temp:  [97.8 °F (36.6 °C)-98.6 °F (37 °C)]   Pulse:  []   Resp:  [18]   BP: (150-187)/(62-86)   SpO2:  [95 %-98 %]     I/O last 3 completed shifts:  In: 3390 [P.O.:1890; I.V.:1500]  Out: -     Physical Exam:   General: Awake and alert. In no acute distress  HEENT: No scleral icterus, MM moist.   NECK: No JVD. Supple,  No swelling, No masses.  CV: Regular rate and rhthym without murmurs, rubs, gallops.  PULM: Clear without crackles, rhonchi, wheezes.  ABD: Soft, nl BS, NT, ND.  BACK: No CVAT.  EXTR: No edema, clubbing, cyanosis.   NEURO: Non focal and grossly intact.  SKIN: No rashes, lesions, ulcers.   MS: No joint effusions.   PSYCH: Normal Mood.    Laboratory:    Recent Labs  Lab 03/29/17  0617   HGB 9.2*   HCT 28.7*   WBC 9.00         Recent Labs  Lab 03/27/17  1650 03/27/17  1831 03/28/17  0222 03/28/17  0224 03/29/17  0617     --  141  --  142   K 5.1  --  4.6  --  4.8     --  109  --  112*   CO2 24  --  24  --  22*   BUN 43*  --  45*  --  44*   CREATININE 2.5*  --  2.3*  --  2.0*   *  --  157*  --  132*   CALCIUM  9.4  --  8.9  --  9.5   PHOS  --   --  4.6* 4.6* 4.9*   CPK  --  66  --   --   --        Lab Results   Component Value Date    URICACID 7.6 (H) 03/27/2017       BNP    Recent Labs  Lab 03/27/17  1650   BNP <10         Problem List:    Active Hospital Problems    Diagnosis  POA    *SACHIN (acute kidney injury) vs. worsening interstitial nephritis [N17.9]  Yes    Chest pain [R07.9]  Yes    Interstitial nephritis [N12]  Yes    Acute bilateral low back pain [M54.5]  Yes    Hypothyroidism [E03.9]  Yes    Essential hypertension [I10]  Yes    Depression [F32.9]  Yes    Diabetes mellitus type 2, uncontrolled [E11.65]  Yes      Resolved Hospital Problems    Diagnosis Date Resolved POA   No resolved problems to display.       Nephrology Assessment/Plan:  1. SACHIN on CKD. Improved with IVF's. Will f/u in clinic.  2. Interstitial nephritis with eosinophilia. Concerned this could be DRESS despite no actual rash. Maintain off Allopurinol.  3. Anemia. Near goal.  4. HTN. Acceptable control. F/U OP.  5. Disposition. Ok fron renal standpoint to be dc'd.     Bogdan Garduno M.D.

## 2017-03-29 NOTE — PLAN OF CARE
Problem: Patient Care Overview  Goal: Plan of Care Review  No pressure areas. Pt is ambulatory with steady gait. Alert and oriented x 4. Pain monitored every 1 to 2 hours with rounds. No falls for injury this shift.

## 2017-03-30 NOTE — DISCHARGE SUMMARY
Discharge Summary  Hospital Medicine    Admit Date: 3/27/2017    Date and Time: 3/29/02093:37 PM    Discharge Attending Physician: Patrick Jordan MD    Primary Care Physician: Diony Alvarado MD    Diagnoses:  Active Hospital Problems    Diagnosis  POA    *SACHIN (acute kidney injury) vs. worsening interstitial nephritis [N17.9]  Yes    Fatigue [R53.83]  Unknown    Uncontrolled diabetes mellitus type 2 without complications [E11.65]  Unknown    Chest pain [R07.9]  Yes    Interstitial nephritis [N12]  Yes    Acute bilateral low back pain without sciatica [M54.5]  Yes    Hypothyroidism [E03.9]  Yes    Essential hypertension [I10]  Yes    Depression [F32.9]  Yes    Diabetes mellitus type 2, uncontrolled [E11.65]  Yes      Resolved Hospital Problems    Diagnosis Date Resolved POA   No resolved problems to display.     Discharged Condition: Good    Hospital Course:   Ms. Newell is a 58yo WF with a H HTN, DM2, Hypothyroidism, Depression, and CKD.  is her nephrologist and she was recently diagnosed with interstitial nephritis. She presented to the hospital today with c/o CP, HA, SOB, fatigue, nausea, and low back. The back pain started while she was an appointment at her PCP. It was on both sides of her low back and continuous. Nothing made it worst or better. The chest pain was sharp, left sided CP, and has been intermittent all day. Her HA was occipital and started gradually after taking a nap. Nothing made it worst or better. Her nephrologist recommended stopping Cellcept and giving IVF. She had been on Cellcept for interstitial nephritis, and today is day 12 of 30. She was unable to take steroids due to her uncontrolled DM. She is currently still having the low back pain, it is better. Her appetite was good, but she did not drink enough water. Patient was admitted to Hospitalist medicine service. Patient was evaluated by Dr. Garduno. Patient was provided supportive care. With IVF hydration symptoms  improved. Allopurinol discontinued by Dr. Garduno. Patient to have BMP and urinary eosinophils checked next week before appointment with Dr. Garduno. Patient was discharged home in stable condition with following discharge plan of care.     Consults: Dr. Garduno    Significant Diagnostic Studies:     Microbiology Results (last 7 days)     ** No results found for the last 168 hours. **        Special Treatments/Procedures: None  Disposition: Home or Self Care    Medications:  Reconciled Home Medications: Discharge Medication List as of 3/29/2017  1:07 PM      CONTINUE these medications which have NOT CHANGED    Details   amlodipine (NORVASC) 5 MG tablet Take 5 mg by mouth once daily., Until Discontinued, Historical Med      atorvastatin (LIPITOR) 20 MG tablet Take 20 mg by mouth once daily., Until Discontinued, Historical Med      colchicine 0.6 mg tablet Take 0.6 mg by mouth once daily., Until Discontinued, Historical Med      doxazosin (CARDURA) 2 MG tablet Take 2 mg by mouth every evening., Until Discontinued, Historical Med      INSULIN GLARGINE,HUM.REC.ANLOG (LANTUS SUBQ) Inject 80 Units into the skin 2 (two) times daily. , Until Discontinued, Historical Med      irbesartan (AVAPRO) 150 MG tablet Take 150 mg by mouth every evening., Until Discontinued, Historical Med      levothyroxine (SYNTHROID) 100 MCG tablet Take 100 mcg by mouth once daily., Until Discontinued, Historical Med      loratadine (CLARITIN) 10 mg tablet Take 10 mg by mouth once daily., Until Discontinued, Historical Med      omeprazole (PRILOSEC) 20 MG capsule Take 20 mg by mouth once daily., Until Discontinued, Historical Med      sodium bicarbonate 650 MG tablet Take 650 mg by mouth once daily., Until Discontinued, Historical Med      vilazodone (VIIBRYD) 40 mg Tab tablet Take 40 mg by mouth once daily., Until Discontinued, Historical Med         STOP taking these medications       allopurinol (ZYLOPRIM) 100 MG tablet Comments:   Reason for  Stopping:         mycophenolate (CELLCEPT) 500 mg Tab Comments:   Reason for Stopping:         albuterol (PROAIR HFA) 90 mcg/actuation inhaler Comments:   Reason for Stopping:         benzonatate (TESSALON) 200 MG capsule Comments:   Reason for Stopping:         fluticasone (FLONASE) 50 mcg/actuation nasal spray Comments:   Reason for Stopping:         hydrocodone-homatropine 5-1.5 mg/5 ml (HYCODAN) 5-1.5 mg/5 mL Syrp Comments:   Reason for Stopping:         insulin glulisine (APIDRA) 100 unit/mL injection Comments:   Reason for Stopping:         lisinopril (PRINIVIL,ZESTRIL) 5 MG tablet Comments:   Reason for Stopping:         montelukast (SINGULAIR) 10 mg tablet Comments:   Reason for Stopping:               Discharge Procedure Orders  Diet general   Order Comments: Cardiac/ 2 gram sodium low cholesterol diet     Diet Diabetic 1800 Calories     Other restrictions (specify):   Order Comments: Fall precautions     Call MD for:   Order Comments: For worsening symptoms, chest pain, shortness of breath, increased abdominal pain, high grade fever, stroke or stroke like symptoms, immediately go to the nearest Emergency Room or call 911 as soon as possible.       Follow-up Information     Follow up with Diony Alvarado MD On 4/11/2017.    Specialty:  Internal Medicine    Why:  @10:15am     Contact information:    [address] [place] [address] [address] [place] [address] [phone]          Follow up with Bogdan Garduno MD.    Specialty:  Nephrology    Why:  Have BMP checked in 7 days.    Contact information:    [place] [address] [phone]

## 2017-03-30 NOTE — PLAN OF CARE
03/30/17 0815   Final Note   Assessment Type Discharge Planning Assessment   Discharge Disposition Home   Discharge planning education complete? Yes

## 2017-03-31 ENCOUNTER — LAB VISIT (OUTPATIENT)
Dept: LAB | Facility: HOSPITAL | Age: 57
End: 2017-03-31
Attending: INTERNAL MEDICINE
Payer: MEDICAID

## 2017-03-31 DIAGNOSIS — N17.9 ACUTE KIDNEY FAILURE, UNSPECIFIED: Primary | ICD-10-CM

## 2017-03-31 LAB
ALBUMIN SERPL BCP-MCNC: 3.7 G/DL
ANION GAP SERPL CALC-SCNC: 9 MMOL/L
BACTERIA #/AREA URNS HPF: NORMAL /HPF
BILIRUB UR QL STRIP: NEGATIVE
BUN SERPL-MCNC: 40 MG/DL
CALCIUM SERPL-MCNC: 9.6 MG/DL
CHLORIDE SERPL-SCNC: 110 MMOL/L
CLARITY UR: CLEAR
CO2 SERPL-SCNC: 22 MMOL/L
COLOR UR: YELLOW
CREAT SERPL-MCNC: 2 MG/DL
EOSINOPHIL URNS QL WRIGHT STN: NORMAL
EST. GFR  (AFRICAN AMERICAN): 31 ML/MIN/1.73 M^2
EST. GFR  (NON AFRICAN AMERICAN): 27 ML/MIN/1.73 M^2
GLUCOSE SERPL-MCNC: 128 MG/DL
GLUCOSE UR QL STRIP: NEGATIVE
HGB UR QL STRIP: ABNORMAL
HYALINE CASTS #/AREA URNS LPF: 0 /LPF
KETONES UR QL STRIP: NEGATIVE
LEUKOCYTE ESTERASE UR QL STRIP: ABNORMAL
MICROSCOPIC COMMENT: NORMAL
NITRITE UR QL STRIP: NEGATIVE
PH UR STRIP: 6 [PH] (ref 5–8)
PHOSPHATE SERPL-MCNC: 4.7 MG/DL
POTASSIUM SERPL-SCNC: 4.7 MMOL/L
PROT UR QL STRIP: ABNORMAL
RBC #/AREA URNS HPF: 2 /HPF (ref 0–4)
SODIUM SERPL-SCNC: 141 MMOL/L
SP GR UR STRIP: 1.01 (ref 1–1.03)
SQUAMOUS #/AREA URNS HPF: 10 /HPF
URATE SERPL-MCNC: 8.6 MG/DL
URN SPEC COLLECT METH UR: ABNORMAL
UROBILINOGEN UR STRIP-ACNC: NEGATIVE EU/DL
WBC #/AREA URNS HPF: 3 /HPF (ref 0–5)

## 2017-03-31 PROCEDURE — 80069 RENAL FUNCTION PANEL: CPT

## 2017-03-31 PROCEDURE — 84550 ASSAY OF BLOOD/URIC ACID: CPT

## 2017-03-31 PROCEDURE — 87205 SMEAR GRAM STAIN: CPT

## 2017-03-31 PROCEDURE — 36415 COLL VENOUS BLD VENIPUNCTURE: CPT

## 2017-03-31 PROCEDURE — 81000 URINALYSIS NONAUTO W/SCOPE: CPT

## 2017-04-12 ENCOUNTER — HOSPITAL ENCOUNTER (INPATIENT)
Facility: HOSPITAL | Age: 57
LOS: 1 days | Discharge: HOME | DRG: 641 | End: 2017-04-14
Attending: EMERGENCY MEDICINE | Admitting: INTERNAL MEDICINE
Payer: MEDICAID

## 2017-04-12 DIAGNOSIS — N17.9 AKI (ACUTE KIDNEY INJURY): ICD-10-CM

## 2017-04-12 DIAGNOSIS — N12 INTERSTITIAL NEPHRITIS: ICD-10-CM

## 2017-04-12 DIAGNOSIS — R07.9 CHEST PAIN: ICD-10-CM

## 2017-04-12 DIAGNOSIS — R73.9 HYPERGLYCEMIA: Primary | ICD-10-CM

## 2017-04-12 DIAGNOSIS — R53.83 FATIGUE, UNSPECIFIED TYPE: ICD-10-CM

## 2017-04-12 DIAGNOSIS — E87.5 HYPERKALEMIA: ICD-10-CM

## 2017-04-12 LAB
ALBUMIN SERPL BCP-MCNC: 3.5 G/DL
ALP SERPL-CCNC: 205 U/L
ALT SERPL W/O P-5'-P-CCNC: 18 U/L
ANION GAP SERPL CALC-SCNC: 9 MMOL/L
AST SERPL-CCNC: 14 U/L
BACTERIA #/AREA URNS HPF: ABNORMAL /HPF
BASOPHILS # BLD AUTO: 0.1 K/UL
BASOPHILS NFR BLD: 0.5 %
BILIRUB SERPL-MCNC: 0.2 MG/DL
BILIRUB UR QL STRIP: NEGATIVE
BUN SERPL-MCNC: 62 MG/DL
CALCIUM SERPL-MCNC: 9.1 MG/DL
CHLORIDE SERPL-SCNC: 107 MMOL/L
CLARITY UR: CLEAR
CO2 SERPL-SCNC: 20 MMOL/L
COLOR UR: YELLOW
CREAT SERPL-MCNC: 2.7 MG/DL
DIFFERENTIAL METHOD: ABNORMAL
EOSINOPHIL # BLD AUTO: 0 K/UL
EOSINOPHIL NFR BLD: 0.1 %
ERYTHROCYTE [DISTWIDTH] IN BLOOD BY AUTOMATED COUNT: 15.5 %
EST. GFR  (AFRICAN AMERICAN): 21 ML/MIN/1.73 M^2
EST. GFR  (NON AFRICAN AMERICAN): 19 ML/MIN/1.73 M^2
GLUCOSE SERPL-MCNC: 473 MG/DL
GLUCOSE UR QL STRIP: ABNORMAL
HCT VFR BLD AUTO: 28.5 %
HGB BLD-MCNC: 9.4 G/DL
HGB UR QL STRIP: ABNORMAL
HYALINE CASTS #/AREA URNS LPF: 0 /LPF
KETONES UR QL STRIP: NEGATIVE
LEUKOCYTE ESTERASE UR QL STRIP: ABNORMAL
LYMPHOCYTES # BLD AUTO: 1.2 K/UL
LYMPHOCYTES NFR BLD: 7.3 %
MCH RBC QN AUTO: 28.4 PG
MCHC RBC AUTO-ENTMCNC: 33.1 %
MCV RBC AUTO: 86 FL
MICROSCOPIC COMMENT: ABNORMAL
MONOCYTES # BLD AUTO: 0.2 K/UL
MONOCYTES NFR BLD: 1 %
NEUTROPHILS # BLD AUTO: 14.4 K/UL
NEUTROPHILS NFR BLD: 91.1 %
NITRITE UR QL STRIP: NEGATIVE
PH UR STRIP: 6 [PH] (ref 5–8)
PLATELET # BLD AUTO: 190 K/UL
PMV BLD AUTO: 8.2 FL
POCT GLUCOSE: 368 MG/DL (ref 70–110)
POCT GLUCOSE: 486 MG/DL (ref 70–110)
POTASSIUM SERPL-SCNC: 6.1 MMOL/L
PROT SERPL-MCNC: 7 G/DL
PROT UR QL STRIP: ABNORMAL
RBC # BLD AUTO: 3.32 M/UL
RBC #/AREA URNS HPF: 1 /HPF (ref 0–4)
SODIUM SERPL-SCNC: 136 MMOL/L
SP GR UR STRIP: 1.01 (ref 1–1.03)
SQUAMOUS #/AREA URNS HPF: 3 /HPF
URN SPEC COLLECT METH UR: ABNORMAL
UROBILINOGEN UR STRIP-ACNC: NEGATIVE EU/DL
WBC # BLD AUTO: 15.9 K/UL
WBC #/AREA URNS HPF: 7 /HPF (ref 0–5)
YEAST URNS QL MICRO: ABNORMAL

## 2017-04-12 PROCEDURE — 93010 ELECTROCARDIOGRAM REPORT: CPT | Mod: ,,, | Performed by: INTERNAL MEDICINE

## 2017-04-12 PROCEDURE — 63600175 PHARM REV CODE 636 W HCPCS: Performed by: PHYSICIAN ASSISTANT

## 2017-04-12 PROCEDURE — 36415 COLL VENOUS BLD VENIPUNCTURE: CPT

## 2017-04-12 PROCEDURE — 85025 COMPLETE CBC W/AUTO DIFF WBC: CPT

## 2017-04-12 PROCEDURE — G0378 HOSPITAL OBSERVATION PER HR: HCPCS

## 2017-04-12 PROCEDURE — 63600175 PHARM REV CODE 636 W HCPCS: Performed by: NURSE PRACTITIONER

## 2017-04-12 PROCEDURE — 96374 THER/PROPH/DIAG INJ IV PUSH: CPT

## 2017-04-12 PROCEDURE — 25000003 PHARM REV CODE 250: Performed by: NURSE PRACTITIONER

## 2017-04-12 PROCEDURE — 80053 COMPREHEN METABOLIC PANEL: CPT

## 2017-04-12 PROCEDURE — 83036 HEMOGLOBIN GLYCOSYLATED A1C: CPT

## 2017-04-12 PROCEDURE — 99284 EMERGENCY DEPT VISIT MOD MDM: CPT | Mod: 25

## 2017-04-12 PROCEDURE — 96361 HYDRATE IV INFUSION ADD-ON: CPT

## 2017-04-12 PROCEDURE — 82962 GLUCOSE BLOOD TEST: CPT

## 2017-04-12 PROCEDURE — 12000002 HC ACUTE/MED SURGE SEMI-PRIVATE ROOM

## 2017-04-12 PROCEDURE — 82570 ASSAY OF URINE CREATININE: CPT

## 2017-04-12 PROCEDURE — 93005 ELECTROCARDIOGRAM TRACING: CPT

## 2017-04-12 PROCEDURE — 25000003 PHARM REV CODE 250: Performed by: PHYSICIAN ASSISTANT

## 2017-04-12 PROCEDURE — 84300 ASSAY OF URINE SODIUM: CPT

## 2017-04-12 PROCEDURE — 81000 URINALYSIS NONAUTO W/SCOPE: CPT

## 2017-04-12 PROCEDURE — 25000003 PHARM REV CODE 250: Performed by: EMERGENCY MEDICINE

## 2017-04-12 PROCEDURE — 87086 URINE CULTURE/COLONY COUNT: CPT

## 2017-04-12 PROCEDURE — 84484 ASSAY OF TROPONIN QUANT: CPT

## 2017-04-12 PROCEDURE — 99223 1ST HOSP IP/OBS HIGH 75: CPT | Mod: ,,, | Performed by: INTERNAL MEDICINE

## 2017-04-12 PROCEDURE — 63600175 PHARM REV CODE 636 W HCPCS: Performed by: INTERNAL MEDICINE

## 2017-04-12 RX ORDER — IBUPROFEN 200 MG
16 TABLET ORAL
Status: DISCONTINUED | OUTPATIENT
Start: 2017-04-12 | End: 2017-04-14 | Stop reason: HOSPADM

## 2017-04-12 RX ORDER — IBUPROFEN 200 MG
16 TABLET ORAL
Status: DISCONTINUED | OUTPATIENT
Start: 2017-04-12 | End: 2017-04-12

## 2017-04-12 RX ORDER — SODIUM BICARBONATE 650 MG/1
650 TABLET ORAL DAILY
Status: DISCONTINUED | OUTPATIENT
Start: 2017-04-13 | End: 2017-04-14 | Stop reason: HOSPADM

## 2017-04-12 RX ORDER — SODIUM CHLORIDE 9 MG/ML
INJECTION, SOLUTION INTRAVENOUS CONTINUOUS
Status: DISCONTINUED | OUTPATIENT
Start: 2017-04-12 | End: 2017-04-13

## 2017-04-12 RX ORDER — ATORVASTATIN CALCIUM 20 MG/1
20 TABLET, FILM COATED ORAL DAILY
Status: DISCONTINUED | OUTPATIENT
Start: 2017-04-13 | End: 2017-04-14 | Stop reason: HOSPADM

## 2017-04-12 RX ORDER — IBUPROFEN 200 MG
24 TABLET ORAL
Status: DISCONTINUED | OUTPATIENT
Start: 2017-04-12 | End: 2017-04-12

## 2017-04-12 RX ORDER — HYDROCODONE BITARTRATE AND ACETAMINOPHEN 5; 325 MG/1; MG/1
1 TABLET ORAL EVERY 4 HOURS PRN
Status: DISCONTINUED | OUTPATIENT
Start: 2017-04-12 | End: 2017-04-14 | Stop reason: HOSPADM

## 2017-04-12 RX ORDER — HEPARIN SODIUM 5000 [USP'U]/ML
5000 INJECTION, SOLUTION INTRAVENOUS; SUBCUTANEOUS EVERY 8 HOURS
Status: DISCONTINUED | OUTPATIENT
Start: 2017-04-12 | End: 2017-04-14 | Stop reason: HOSPADM

## 2017-04-12 RX ORDER — PREDNISONE 20 MG/1
40 TABLET ORAL DAILY
COMMUNITY
Start: 2017-04-10 | End: 2017-04-19

## 2017-04-12 RX ORDER — ACETAMINOPHEN 325 MG/1
650 TABLET ORAL EVERY 6 HOURS PRN
Status: DISCONTINUED | OUTPATIENT
Start: 2017-04-12 | End: 2017-04-12

## 2017-04-12 RX ORDER — DOXAZOSIN 1 MG/1
2 TABLET ORAL NIGHTLY
Status: DISCONTINUED | OUTPATIENT
Start: 2017-04-12 | End: 2017-04-13

## 2017-04-12 RX ORDER — AMLODIPINE BESYLATE 5 MG/1
5 TABLET ORAL DAILY
Status: DISCONTINUED | OUTPATIENT
Start: 2017-04-13 | End: 2017-04-12

## 2017-04-12 RX ORDER — IRBESARTAN 150 MG/1
TABLET ORAL
Status: DISPENSED
Start: 2017-04-12 | End: 2017-04-13

## 2017-04-12 RX ORDER — PREDNISONE 20 MG/1
40 TABLET ORAL DAILY
Status: DISCONTINUED | OUTPATIENT
Start: 2017-04-13 | End: 2017-04-13

## 2017-04-12 RX ORDER — GLUCAGON 1 MG
1 KIT INJECTION
Status: DISCONTINUED | OUTPATIENT
Start: 2017-04-12 | End: 2017-04-14 | Stop reason: HOSPADM

## 2017-04-12 RX ORDER — IBUPROFEN 200 MG
24 TABLET ORAL
Status: DISCONTINUED | OUTPATIENT
Start: 2017-04-12 | End: 2017-04-14 | Stop reason: HOSPADM

## 2017-04-12 RX ORDER — INSULIN ASPART 100 [IU]/ML
1-10 INJECTION, SOLUTION INTRAVENOUS; SUBCUTANEOUS
Status: DISCONTINUED | OUTPATIENT
Start: 2017-04-12 | End: 2017-04-14 | Stop reason: HOSPADM

## 2017-04-12 RX ORDER — PANTOPRAZOLE SODIUM 40 MG/1
40 TABLET, DELAYED RELEASE ORAL DAILY
Status: DISCONTINUED | OUTPATIENT
Start: 2017-04-13 | End: 2017-04-12 | Stop reason: SDUPTHER

## 2017-04-12 RX ORDER — PANTOPRAZOLE SODIUM 40 MG/1
40 TABLET, DELAYED RELEASE ORAL DAILY
Status: DISCONTINUED | OUTPATIENT
Start: 2017-04-13 | End: 2017-04-14 | Stop reason: HOSPADM

## 2017-04-12 RX ORDER — HYDRALAZINE HYDROCHLORIDE 20 MG/ML
10 INJECTION INTRAMUSCULAR; INTRAVENOUS EVERY 6 HOURS PRN
Status: DISCONTINUED | OUTPATIENT
Start: 2017-04-12 | End: 2017-04-14 | Stop reason: HOSPADM

## 2017-04-12 RX ORDER — GLUCAGON 1 MG
1 KIT INJECTION
Status: DISCONTINUED | OUTPATIENT
Start: 2017-04-12 | End: 2017-04-12

## 2017-04-12 RX ORDER — ACETAMINOPHEN 325 MG/1
650 TABLET ORAL
Status: COMPLETED | OUTPATIENT
Start: 2017-04-12 | End: 2017-04-12

## 2017-04-12 RX ORDER — ONDANSETRON 2 MG/ML
4 INJECTION INTRAMUSCULAR; INTRAVENOUS EVERY 6 HOURS PRN
Status: DISCONTINUED | OUTPATIENT
Start: 2017-04-12 | End: 2017-04-14 | Stop reason: HOSPADM

## 2017-04-12 RX ORDER — DIPHENHYDRAMINE HCL 25 MG
25 CAPSULE ORAL EVERY 6 HOURS PRN
Status: DISCONTINUED | OUTPATIENT
Start: 2017-04-12 | End: 2017-04-14 | Stop reason: HOSPADM

## 2017-04-12 RX ORDER — ONDANSETRON 2 MG/ML
4 INJECTION INTRAMUSCULAR; INTRAVENOUS
Status: COMPLETED | OUTPATIENT
Start: 2017-04-12 | End: 2017-04-12

## 2017-04-12 RX ORDER — ACETAMINOPHEN 325 MG/1
650 TABLET ORAL EVERY 6 HOURS PRN
Status: DISCONTINUED | OUTPATIENT
Start: 2017-04-12 | End: 2017-04-14 | Stop reason: HOSPADM

## 2017-04-12 RX ORDER — IRBESARTAN 150 MG/1
150 TABLET ORAL NIGHTLY
Status: DISCONTINUED | OUTPATIENT
Start: 2017-04-12 | End: 2017-04-12

## 2017-04-12 RX ORDER — AMLODIPINE BESYLATE 5 MG/1
10 TABLET ORAL DAILY
Status: DISCONTINUED | OUTPATIENT
Start: 2017-04-13 | End: 2017-04-14 | Stop reason: HOSPADM

## 2017-04-12 RX ORDER — INSULIN GLARGINE 100 [IU]/ML
80 INJECTION, SOLUTION SUBCUTANEOUS 2 TIMES DAILY
Status: DISCONTINUED | OUTPATIENT
Start: 2017-04-12 | End: 2017-04-12 | Stop reason: CLARIF

## 2017-04-12 RX ORDER — LEVOTHYROXINE SODIUM 100 UG/1
100 TABLET ORAL
Status: DISCONTINUED | OUTPATIENT
Start: 2017-04-13 | End: 2017-04-14 | Stop reason: HOSPADM

## 2017-04-12 RX ORDER — INSULIN ASPART 100 [IU]/ML
1-10 INJECTION, SOLUTION INTRAVENOUS; SUBCUTANEOUS
Status: DISCONTINUED | OUTPATIENT
Start: 2017-04-12 | End: 2017-04-12 | Stop reason: SDUPTHER

## 2017-04-12 RX ADMIN — DOXAZOSIN MESYLATE 2 MG: 1 TABLET ORAL at 11:04

## 2017-04-12 RX ADMIN — INSULIN HUMAN 10 UNITS: 100 INJECTION, SOLUTION PARENTERAL at 11:04

## 2017-04-12 RX ADMIN — ONDANSETRON 4 MG: 2 INJECTION INTRAMUSCULAR; INTRAVENOUS at 05:04

## 2017-04-12 RX ADMIN — CALCIUM GLUCONATE 1 G: 94 INJECTION, SOLUTION INTRAVENOUS at 11:04

## 2017-04-12 RX ADMIN — ACETAMINOPHEN 650 MG: 325 TABLET, FILM COATED ORAL at 05:04

## 2017-04-12 RX ADMIN — INSULIN DETEMIR 80 UNITS: 100 INJECTION, SOLUTION SUBCUTANEOUS at 11:04

## 2017-04-12 RX ADMIN — SODIUM CHLORIDE 1000 ML: 0.9 INJECTION, SOLUTION INTRAVENOUS at 05:04

## 2017-04-12 RX ADMIN — HYDRALAZINE HYDROCHLORIDE 10 MG: 20 INJECTION INTRAMUSCULAR; INTRAVENOUS at 11:04

## 2017-04-12 RX ADMIN — SODIUM POLYSTYRENE SULFONATE 15 G: 15 SUSPENSION ORAL; RECTAL at 11:04

## 2017-04-12 RX ADMIN — HEPARIN SODIUM 5000 UNITS: 5000 INJECTION, SOLUTION INTRAVENOUS; SUBCUTANEOUS at 11:04

## 2017-04-12 RX ADMIN — SODIUM CHLORIDE: 0.9 INJECTION, SOLUTION INTRAVENOUS at 10:04

## 2017-04-12 NOTE — IP AVS SNAPSHOT
[address] [place] [address]  Phone: [phone]           Patient Discharge Instructions   Our goal is to set you up for success. This packet includes information on your condition, medications, and your home care.  It will help you care for yourself to prevent having to return to the hospital.     Please ask your nurse if you have any questions.      There are many details to remember when preparing to leave the hospital. Here is what you will need to do:    1. Take your medicine. If you are prescribed medications, review your Medication List on the following pages. You may have new medications to  at the pharmacy and others that you'll need to stop taking. Review the instructions for how and when to take your medications. Talk with your doctor or nurses if you are unsure of what to do.     2. Go to your follow-up appointments. Specific follow-up information is listed in the following pages. Your may be contacted by a nurse or clinical provider about future appointments. Be sure we have all of the phone numbers to reach you. Please contact your provider's office if you are unable to make an appointment.     3. Watch for warning signs. Your doctor or nurse will give you detailed warning signs to watch for and when to call for assistance. These instructions may also include educational information about your condition. If you experience any of warning signs to your health, call your doctor.               ** Verify the list of medication(s) below is accurate and up to date. Carry this with you in case of emergency. If your medications have changed, please notify your healthcare provider.             Medication List      START taking these medications        Additional Info                      ciprofloxacin HCl 250 MG tablet   Commonly known as:  CIPRO   Quantity:  6 tablet   Refills:  0   Dose:  250 mg    Instructions:  Take 1 tablet (250 mg total) by mouth 2  (two) times daily.     Begin Date    AM    Noon    PM    Bedtime       glimepiride 2 MG tablet   Commonly known as:  AMARYL   Quantity:  30 tablet   Refills:  0   Dose:  2 mg    Last time this was given:  2 mg on 4/14/2017  7:45 AM   Instructions:  Take 1 tablet (2 mg total) by mouth daily with breakfast.     Begin Date    AM    Noon    PM    Bedtime         CHANGE how you take these medications        Additional Info                      * amlodipine 5 MG tablet   Commonly known as:  NORVASC   Refills:  0   Dose:  5 mg   What changed:  Another medication with the same name was added. Make sure you understand how and when to take each.    Last time this was given:  10 mg on 4/14/2017  7:55 AM   Instructions:  Take 5 mg by mouth once daily.     Begin Date    AM    Noon    PM    Bedtime       * amlodipine 10 MG tablet   Commonly known as:  NORVASC   Quantity:  30 tablet   Refills:  0   Dose:  10 mg   What changed:  You were already taking a medication with the same name, and this prescription was added. Make sure you understand how and when to take each.    Last time this was given:  10 mg on 4/14/2017  7:55 AM   Instructions:  Take 1 tablet (10 mg total) by mouth once daily.     Begin Date    AM    Noon    PM    Bedtime       levothyroxine 100 MCG tablet   Commonly known as:  SYNTHROID   Quantity:  30 tablet   Refills:  0   Dose:  100 mcg   What changed:    - how much to take  - when to take this    Last time this was given:  100 mcg on 4/14/2017  6:41 AM   Instructions:  Take 1 tablet (100 mcg total) by mouth before breakfast.     Begin Date    AM    Noon    PM    Bedtime       * predniSONE 20 MG tablet   Commonly known as:  DELTASONE   Refills:  0   Dose:  40 mg   What changed:  Another medication with the same name was added. Make sure you understand how and when to take each.    Last time this was given:  20 mg on 4/14/2017  7:55 AM   Instructions:  Take 40 mg by mouth once daily. For 10 days, started 4/10/17,  ended 4/19/17     Begin Date    AM    Noon    PM    Bedtime       * predniSONE 20 MG tablet   Commonly known as:  DELTASONE   Refills:  0   What changed:  You were already taking a medication with the same name, and this prescription was added. Make sure you understand how and when to take each.    Last time this was given:  20 mg on 4/14/2017  7:55 AM   Instructions:  Take 20 mg prednisone daily for 2 days then 10 mg daily for 2 days and then STOP.     Begin Date    AM    Noon    PM    Bedtime       * Notice:  This list has 4 medication(s) that are the same as other medications prescribed for you. Read the directions carefully, and ask your doctor or other care provider to review them with you.      CONTINUE taking these medications        Additional Info                      atorvastatin 20 MG tablet   Commonly known as:  LIPITOR   Refills:  0   Dose:  20 mg    Last time this was given:  20 mg on 4/14/2017  7:55 AM   Instructions:  Take 20 mg by mouth once daily.     Begin Date    AM    Noon    PM    Bedtime       doxazosin 2 MG tablet   Commonly known as:  CARDURA   Refills:  0   Dose:  2 mg    Last time this was given:  4 mg on 4/13/2017  8:38 PM   Instructions:  Take 2 mg by mouth every evening.     Begin Date    AM    Noon    PM    Bedtime       irbesartan 150 MG tablet   Commonly known as:  AVAPRO   Refills:  0   Dose:  150 mg    Instructions:  Take 150 mg by mouth every evening.     Begin Date    AM    Noon    PM    Bedtime       LANTUS SUBQ   Refills:  0   Dose:  85 Units    Instructions:  Inject 85 Units into the skin 2 (two) times daily.     Begin Date    AM    Noon    PM    Bedtime       loratadine 10 mg tablet   Commonly known as:  CLARITIN   Refills:  0   Dose:  10 mg    Instructions:  Take 10 mg by mouth once daily.     Begin Date    AM    Noon    PM    Bedtime       omeprazole 20 MG capsule   Commonly known as:  PRILOSEC   Refills:  0   Dose:  20 mg    Instructions:  Take 20 mg by mouth once daily.      Begin Date    AM    Noon    PM    Bedtime       sodium bicarbonate 650 MG tablet   Refills:  0   Dose:  650 mg    Last time this was given:  650 mg on 4/14/2017  7:54 AM   Instructions:  Take 650 mg by mouth once daily.     Begin Date    AM    Noon    PM    Bedtime       vilazodone 40 mg Tab tablet   Commonly known as:  VIIBRYD   Refills:  0   Dose:  40 mg    Instructions:  Take 40 mg by mouth once daily.     Begin Date    AM    Noon    PM    Bedtime            Where to Get Your Medications      You can get these medications from any pharmacy     Bring a paper prescription for each of these medications     amlodipine 10 MG tablet    ciprofloxacin HCl 250 MG tablet    glimepiride 2 MG tablet    levothyroxine 100 MCG tablet         Information about where to get these medications is not yet available     ! Ask your nurse or doctor about these medications     predniSONE 20 MG tablet                  Please bring to all follow up appointments:    1. A copy of your discharge instructions.  2. All medicines you are currently taking in their original bottles.  3. Identification and insurance card.    Please arrive 15 minutes ahead of scheduled appointment time.    Please call 24 hours in advance if you must reschedule your appointment and/or time.        Follow-up Information     Follow up with Diony Alvarado MD In 1 week.    Specialty:  Internal Medicine    Contact information:    [address] [place] [address] [address] [place] [address] [phone]          Follow up with Dalton Heaton MD In 1 week.    Specialty:  Nephrology    Contact information:    [address] [place] [address] [facility]  [place] [address] [phone]          Please follow up.    Contact information:    Please keep blood sugar log closely.        Discharge Instructions     Future Orders    Call MD for:     Comments:    For worsening symptoms, chest pain, shortness of breath, increased abdominal pain, high grade fever, stroke or stroke  "like symptoms, immediately go to the nearest Emergency Room or call 911 as soon as possible.    Diet Diabetic 1800 Calories     Diet general     Comments:    Cardiac/ 2 gram sodium low cholesterol diet    Questions:    Total calories:      Fat restriction, if any:      Protein restriction, if any:      Na restriction, if any:      Fluid restriction:      Additional restrictions:      Other restrictions (specify):     Comments:    Fall precautions        Primary Diagnosis     Your primary diagnosis was:  High Potassium Levels      Admission Information     Date & Time Provider Department CSN    4/12/2017  4:14 PM Patrick Jordan MD [facility] [id number]      Care Providers     Provider Role Specialty Primary office phone    Patrick Jordan MD Attending Provider Internal Medicine [phone]    Dayron Oneal MD Consulting Physician  Nephrology [phone]    Bogdan Garduno MD Consulting Physician  Nephrology  [phone]    Dalton Heaton MD Consulting Physician  Nephrology [phone]      Your Vitals Were     BP Pulse Temp Resp Height Weight    159/71 (BP Location: Right arm, Patient Position: Lying, BP Method: Automatic) 79 98.2 °F (36.8 °C) 18 5' 7" (1.702 m) 104.8 kg (231 lb)    Last Period SpO2 BMI          02/28/2012 97% 36.18 kg/m2        Recent Lab Values        3/27/2017 4/12/2017                        8:48 PM 11:39 PM          A1C 8.9 (H) 8.9 (H)          Comment for A1C at  8:48 PM on 3/27/2017:  According to ADA guidelines, hemoglobin A1C <7.0% represents  optimal control in non-pregnant diabetic patients.  Different  metrics may apply to specific populations.   Standards of Medical Care in Diabetes - 2016.  For the purpose of screening for the presence of diabetes:  <5.7%     Consistent with the absence of diabetes  5.7-6.4%  Consistent with increasing risk for diabetes   (prediabetes)  >or=6.5%  Consistent with diabetes  Currently no consensus exists for use of hemoglobin " A1C  for diagnosis of diabetes for children.      Comment for A1C at 11:39 PM on 4/12/2017:  According to ADA guidelines, hemoglobin A1C <7.0% represents  optimal control in non-pregnant diabetic patients.  Different  metrics may apply to specific populations.   Standards of Medical Care in Diabetes - 2016.  For the purpose of screening for the presence of diabetes:  <5.7%     Consistent with the absence of diabetes  5.7-6.4%  Consistent with increasing risk for diabetes   (prediabetes)  >or=6.5%  Consistent with diabetes  Currently no consensus exists for use of hemoglobin A1C  for diagnosis of diabetes for children.        Allergies as of 4/14/2017        Reactions    Codeine Itching    Can take oxycodone and hydrocodone with Benadryl      [facility]     [facility] Care Line - 24/7 Assistance  Unless otherwise directed by your provider, please contact [facility], our nurse care line that is available for 24/7 assistance.     Registered nurses in the [facility] provide clinical advisement, health education, appointment booking, and other advisory services.  Call for this free service at [phone].        Advance Directives     An advance directive is a document which, in the event you are no longer able to make decisions for yourself, tells your healthcare team what kind of treatment you do or do not want to receive, or who you would like to make those decisions for you.  If you do not currently have an advance directive, [facility] encourages you to create one.  For more information call:  [phone]), [phone] ([phone]),  or log on to [email or web address].        Language Assistance Services     ATTENTION: Language assistance services are available, free of charge. Please call [phone].      ATENCIÓN: Si habla español, tiene a myers disposición servicios gratuitos de asistencia lingüística. Llame al [phone].     CHÚ Ý: N?u b?n nói Ti?ng  Vi?t, có các d?ch v? h? tr? ngôn ng? mi?n phí dành cho b?n. G?i s? [phone].        Diabetes Discharge Instructions                                    [facility] complies with applicable Federal civil rights laws and does not discriminate on the basis of race, color, national origin, age, disability, or sex.

## 2017-04-13 LAB
ANION GAP SERPL CALC-SCNC: 8 MMOL/L
APTT BLDCRRT: 25.2 SEC
BASOPHILS # BLD AUTO: 0 K/UL
BASOPHILS NFR BLD: 0.3 %
BUN SERPL-MCNC: 56 MG/DL
CALCIUM SERPL-MCNC: 9.4 MG/DL
CHLORIDE SERPL-SCNC: 112 MMOL/L
CO2 SERPL-SCNC: 25 MMOL/L
CREAT SERPL-MCNC: 2.2 MG/DL
CREAT UR-MCNC: 34 MG/DL
CREAT UR-MCNC: 35.7 MG/DL
DIFFERENTIAL METHOD: ABNORMAL
EOSINOPHIL # BLD AUTO: 0 K/UL
EOSINOPHIL NFR BLD: 0.1 %
EOSINOPHIL URNS QL WRIGHT STN: NORMAL
ERYTHROCYTE [DISTWIDTH] IN BLOOD BY AUTOMATED COUNT: 15.3 %
EST. GFR  (AFRICAN AMERICAN): 27 ML/MIN/1.73 M^2
EST. GFR  (NON AFRICAN AMERICAN): 24 ML/MIN/1.73 M^2
ESTIMATED AVG GLUCOSE: 209 MG/DL
GLUCOSE SERPL-MCNC: 144 MG/DL
HBA1C MFR BLD HPLC: 8.9 %
HCT VFR BLD AUTO: 27.3 %
HGB BLD-MCNC: 8.8 G/DL
INR PPP: 1.1
LYMPHOCYTES # BLD AUTO: 2.2 K/UL
LYMPHOCYTES NFR BLD: 15.7 %
MAGNESIUM SERPL-MCNC: 2.3 MG/DL
MCH RBC QN AUTO: 27.7 PG
MCHC RBC AUTO-ENTMCNC: 32.2 %
MCV RBC AUTO: 86 FL
MONOCYTES # BLD AUTO: 0.7 K/UL
MONOCYTES NFR BLD: 5.4 %
NEUTROPHILS # BLD AUTO: 10.9 K/UL
NEUTROPHILS NFR BLD: 78.5 %
PHOSPHATE SERPL-MCNC: 4.4 MG/DL
PLATELET # BLD AUTO: 181 K/UL
PMV BLD AUTO: 8 FL
POCT GLUCOSE: 134 MG/DL (ref 70–110)
POCT GLUCOSE: 254 MG/DL (ref 70–110)
POCT GLUCOSE: 345 MG/DL (ref 70–110)
POTASSIUM SERPL-SCNC: 4.8 MMOL/L
PROTHROMBIN TIME: 11.2 SEC
RBC # BLD AUTO: 3.18 M/UL
SODIUM SERPL-SCNC: 145 MMOL/L
SODIUM UR-SCNC: 62 MMOL/L
SODIUM UR-SCNC: 74 MMOL/L
TROPONIN I SERPL DL<=0.01 NG/ML-MCNC: 0.01 NG/ML
URATE SERPL-MCNC: 10 MG/DL
WBC # BLD AUTO: 13.9 K/UL

## 2017-04-13 PROCEDURE — 25000003 PHARM REV CODE 250: Performed by: INTERNAL MEDICINE

## 2017-04-13 PROCEDURE — 83735 ASSAY OF MAGNESIUM: CPT

## 2017-04-13 PROCEDURE — 36415 COLL VENOUS BLD VENIPUNCTURE: CPT

## 2017-04-13 PROCEDURE — 84300 ASSAY OF URINE SODIUM: CPT

## 2017-04-13 PROCEDURE — 12000002 HC ACUTE/MED SURGE SEMI-PRIVATE ROOM

## 2017-04-13 PROCEDURE — 85610 PROTHROMBIN TIME: CPT

## 2017-04-13 PROCEDURE — 25000003 PHARM REV CODE 250: Performed by: EMERGENCY MEDICINE

## 2017-04-13 PROCEDURE — 84100 ASSAY OF PHOSPHORUS: CPT

## 2017-04-13 PROCEDURE — 63600175 PHARM REV CODE 636 W HCPCS: Performed by: NURSE PRACTITIONER

## 2017-04-13 PROCEDURE — 84484 ASSAY OF TROPONIN QUANT: CPT

## 2017-04-13 PROCEDURE — 85730 THROMBOPLASTIN TIME PARTIAL: CPT

## 2017-04-13 PROCEDURE — 82570 ASSAY OF URINE CREATININE: CPT

## 2017-04-13 PROCEDURE — 87205 SMEAR GRAM STAIN: CPT

## 2017-04-13 PROCEDURE — 85025 COMPLETE CBC W/AUTO DIFF WBC: CPT

## 2017-04-13 PROCEDURE — 80048 BASIC METABOLIC PNL TOTAL CA: CPT

## 2017-04-13 PROCEDURE — 63600175 PHARM REV CODE 636 W HCPCS: Performed by: EMERGENCY MEDICINE

## 2017-04-13 PROCEDURE — 99233 SBSQ HOSP IP/OBS HIGH 50: CPT | Mod: ,,, | Performed by: INTERNAL MEDICINE

## 2017-04-13 PROCEDURE — 25000003 PHARM REV CODE 250: Performed by: NURSE PRACTITIONER

## 2017-04-13 PROCEDURE — 84550 ASSAY OF BLOOD/URIC ACID: CPT

## 2017-04-13 RX ORDER — GLIMEPIRIDE 2 MG/1
2 TABLET ORAL
Status: DISCONTINUED | OUTPATIENT
Start: 2017-04-13 | End: 2017-04-14 | Stop reason: HOSPADM

## 2017-04-13 RX ORDER — PREDNISONE 20 MG/1
20 TABLET ORAL DAILY
Status: DISCONTINUED | OUTPATIENT
Start: 2017-04-14 | End: 2017-04-14 | Stop reason: HOSPADM

## 2017-04-13 RX ORDER — DOXAZOSIN 1 MG/1
4 TABLET ORAL NIGHTLY
Status: DISCONTINUED | OUTPATIENT
Start: 2017-04-13 | End: 2017-04-14 | Stop reason: HOSPADM

## 2017-04-13 RX ORDER — SODIUM CHLORIDE 450 MG/100ML
INJECTION, SOLUTION INTRAVENOUS CONTINUOUS
Status: DISCONTINUED | OUTPATIENT
Start: 2017-04-13 | End: 2017-04-14 | Stop reason: HOSPADM

## 2017-04-13 RX ADMIN — PANTOPRAZOLE SODIUM 40 MG: 40 TABLET, DELAYED RELEASE ORAL at 08:04

## 2017-04-13 RX ADMIN — HEPARIN SODIUM 5000 UNITS: 5000 INJECTION, SOLUTION INTRAVENOUS; SUBCUTANEOUS at 10:04

## 2017-04-13 RX ADMIN — SODIUM CHLORIDE: 0.45 INJECTION, SOLUTION INTRAVENOUS at 10:04

## 2017-04-13 RX ADMIN — GLIMEPIRIDE 2 MG: 2 TABLET ORAL at 08:04

## 2017-04-13 RX ADMIN — DOXAZOSIN MESYLATE 4 MG: 1 TABLET ORAL at 08:04

## 2017-04-13 RX ADMIN — SODIUM CHLORIDE: 0.45 INJECTION, SOLUTION INTRAVENOUS at 05:04

## 2017-04-13 RX ADMIN — INSULIN ASPART 6 UNITS: 100 INJECTION, SOLUTION INTRAVENOUS; SUBCUTANEOUS at 05:04

## 2017-04-13 RX ADMIN — LEVOTHYROXINE SODIUM 100 MCG: 100 TABLET ORAL at 05:04

## 2017-04-13 RX ADMIN — INSULIN DETEMIR 80 UNITS: 100 INJECTION, SOLUTION SUBCUTANEOUS at 08:04

## 2017-04-13 RX ADMIN — PREDNISONE 40 MG: 20 TABLET ORAL at 08:04

## 2017-04-13 RX ADMIN — INSULIN ASPART 4 UNITS: 100 INJECTION, SOLUTION INTRAVENOUS; SUBCUTANEOUS at 08:04

## 2017-04-13 RX ADMIN — ATORVASTATIN CALCIUM 20 MG: 20 TABLET, FILM COATED ORAL at 08:04

## 2017-04-13 RX ADMIN — HEPARIN SODIUM 5000 UNITS: 5000 INJECTION, SOLUTION INTRAVENOUS; SUBCUTANEOUS at 02:04

## 2017-04-13 RX ADMIN — AMLODIPINE BESYLATE 10 MG: 5 TABLET ORAL at 08:04

## 2017-04-13 RX ADMIN — CEFTRIAXONE 1 G: 1 INJECTION, SOLUTION INTRAVENOUS at 01:04

## 2017-04-13 RX ADMIN — CEFTRIAXONE 1 G: 1 INJECTION, SOLUTION INTRAVENOUS at 11:04

## 2017-04-13 RX ADMIN — SODIUM BICARBONATE 650 MG TABLET 650 MG: at 08:04

## 2017-04-13 NOTE — ASSESSMENT & PLAN NOTE
Chronic, acutely hypertensive-- likely 2/2 alteration in medication including D/C of ACEI with worsening renal function.  Holding ARB with SACHIN.  Increase amlodopine, treat with PRN hydralazine.  Consider additional oral medications to better regulate BP.  Monitor BP closely, titrate medications for sustained BP control.

## 2017-04-13 NOTE — PROGRESS NOTES
Progress Note  Hospital Medicine  Patient Name:Andra Newell  MRN:  [id number]  Patient Class: OP- Observation  Admit Date: 4/12/2017  Length of Stay: 0 days  Expected Discharge Date:   Attending Physician: Patrick Jordan MD  Primary Care Provider:  Diony Alvarado MD    SUBJECTIVE:     Principal Problem: Hyperkalemia  Initial history of present illness: Andra Newell is a 57 y.o. Female with PMHx significant for IDDM, CKD, HTN, and hypothyroidism. She was admitted to the service of hospital medicine with hyperkalemia. She presented to the ED with complaint of hyperglycemia with BGs in the 400s despite twice daily basal insulin dosing. She has noticed the elevated blood glucose since beginning prednisone for interstitial nephritis. She states she was supposed to be getting short-acting insulin, but her insurance has not yet approved the additional insulin. Her elevated glucose levels are associated with increased thirst, as well as nausea and HA (both of which have improved since arrival to ED). She reports slight dysuria, no changes in urination. She endorses some chest heaviness that has been constant since 2 pm; she reports the heaviness has lessened but persists. She has experienced similar symptoms before. She is seen by Dr. Hudson and underwent a negative stress test 1.5 yrs ago. Her labs in ED revealed hyperglycemia, worsening renal function, and hyperkalemia. She was admitted to the service of hospital medicine for further evaluation and management.    PMH/PSH/SH/FH/Meds: reviewed.    Symptoms/Review of Systems: Patient is upset and anxious, requesting 2nd opion nephrologist visit. No shortness of breath, cough, chest pain or headache, fever or abdominal pain.     Diet:  Adequate intake.    Activity level: Normal.    Pain:  Patient reports no pain.       OBJECTIVE:   Vital Signs (Most Recent):      Temp: 98.2 °F (36.8 °C) (04/13/17 0721)  Pulse: 78 (04/13/17 0721)  Resp: 18 (04/13/17 0721)  BP: (!)  175/81 (04/13/17 0721)  SpO2: 97 % (04/13/17 0721)       Vital Signs Range (Last 24H):  Temp:  [96.8 °F (36 °C)-98.2 °F (36.8 °C)]   Pulse:  []   Resp:  [16-18]   BP: (160-227)/(70-88)   SpO2:  [93 %-98 %]     Weight: 105.6 kg (232 lb 12.9 oz)  Body mass index is 36.46 kg/(m^2).  No intake or output data in the 24 hours ending 04/13/17 0753  Physical Examination:  Constitutional: She is oriented to person, place, and time. She appears well-developed and well-nourished. No distress.   HENT:   Head: Normocephalic and atraumatic.   Eyes: Conjunctivae and EOM are normal. Pupils are equal, round, and reactive to light.   Neck: Normal range of motion. Neck supple. No thyromegaly present.   Cardiovascular: Normal rate, regular rhythm, normal heart sounds and intact distal pulses.   Pulmonary/Chest: Effort normal and breath sounds normal. No respiratory distress.   Abdominal: Soft. Bowel sounds are normal. She exhibits no distension. There is no tenderness.   Musculoskeletal: Normal range of motion. She exhibits no edema.   No CVA tenderness   Neurological: She is alert and oriented to person, place, and time. No cranial nerve deficit.   Skin: Skin is warm and dry.   Psychiatric: She has a normal mood and affect. Her behavior is normal. Judgment and thought content normal.     CBC:    Recent Labs  Lab 04/12/17  1716 04/13/17  0425   WBC 15.90* 13.90*   RBC 3.32* 3.18*   HGB 9.4* 8.8*   HCT 28.5* 27.3*    181   MCV 86 86   MCH 28.4 27.7   MCHC 33.1 32.2   BMP    Recent Labs  Lab 04/12/17  1758 04/13/17  0425   * 144*    145   K 6.1* 4.8    112*   CO2 20* 25   BUN 62* 56*   CREATININE 2.7* 2.2*   CALCIUM 9.1 9.4   MG  --  2.3      Diagnostic Results:  Microbiology Results (last 7 days)     Procedure Component Value Units Date/Time    Urine Culture [[id number]] Collected:  04/12/17 2147    Order Status:  Sent Specimen:  Urine Updated:  04/12/17 2147         Assessment/Plan:   * Hyperkalemia -  better  Monitor on telemetry.  Follow potassium level.     SACHIN on CKD 4  SACHIN vs. Worsening interstital nephritis.   Continue IVF hydration. Follow renal panel and electrolytes closely.   Await nephrology input.   Adjust renal dose medications for creatinine clearance 10-50cc/min. Avoid NSAIDs, Vickers-II inhibitors, ACE-I, Angiotensin Receptor Blockers, or Aminoglycosides. Holding ARB. Check Urine Na, Cr.       Uncontrolled type 2 diabetes mellitus with hyperglycemia  IDDM with poor control, acutely worsened hyperglycemia with initiation of steroids. Continue basal insulin as per home regimen, add moderate sliding scale insulin for hyperglycemia with blood glucose monitoring every four hours. No sign of ketoacidosis. Diabetic diet, check HgbA1c.    Start Amaryl 2 mg daily.      Interstitial nephritis  PO Prednisone use as per nephrology. Reduced Prednisone 20 mg daily.     Essential hypertension  Chronic, acutely hypertensive-- likely 2/2 alteration in medication including D/C of ACEI with worsening renal function. Holding ARB with SACHIN.   Increase Doxazosin 4 mg po q day.  Treat with PRN hydralazine.  BP. Monitor BP closely, titrate medications for sustained BP control.     Chest Pain  Tele-monitoring.  Serial cardiac markers negative.     Bilateral low back pain without sciatica  Ongoing problem, improving. PRN narcotics for pain control. Monitor closely.     UTI  Follow urine C/S. Continue IV Ceftriaxone.     I had a long discussion with patient, family members, Dr. Garduno and Dr. Heaton. Answered all the questions by the patient's family members. Patient will be seen by Dr. Heaton as per her wishes. Time spent in care of the patient, counseling and coordination of care (Greater than 50% spent in direct face to face contact): 35 min.    VTE Risk Mitigation         Ordered     heparin (porcine) injection 5,000 Units  Every 8 hours     Route:  Subcutaneous        04/12/17 0919     Low Risk of VTE  Once      04/12/17  2147        Patrick Jordan MD  Department of Hospital Medicine   [facility]

## 2017-04-13 NOTE — SUBJECTIVE & OBJECTIVE
Past Medical History:   Diagnosis Date    Arthritis     Asthma     allergic airway    Depression     Diabetes mellitus     Gout     Hypertension     Renal disorder     Thyroid disease        Past Surgical History:   Procedure Laterality Date    ACHILLES TENDON SURGERY Left May 2015    BACK SURGERY      CHOLECYSTECTOMY      HEMORRHOID SURGERY      HIP SURGERY      JOINT REPLACEMENT      left    KNEE SURGERY      SHOULDER ARTHROSCOPY      SHOULDER SURGERY         Review of patient's allergies indicates:   Allergen Reactions    Codeine Itching     Can take oxycodone and hydrocodone with Benadryl       No current facility-administered medications on file prior to encounter.      Current Outpatient Prescriptions on File Prior to Encounter   Medication Sig    amlodipine (NORVASC) 5 MG tablet Take 5 mg by mouth once daily.    atorvastatin (LIPITOR) 20 MG tablet Take 20 mg by mouth once daily.    doxazosin (CARDURA) 2 MG tablet Take 2 mg by mouth every evening.    INSULIN GLARGINE,HUM.REC.ANLOG (LANTUS SUBQ) Inject 85 Units into the skin 2 (two) times daily.     irbesartan (AVAPRO) 150 MG tablet Take 150 mg by mouth every evening.    levothyroxine (SYNTHROID) 100 MCG tablet Take 75 mcg by mouth once daily.     loratadine (CLARITIN) 10 mg tablet Take 10 mg by mouth once daily.    omeprazole (PRILOSEC) 20 MG capsule Take 20 mg by mouth once daily.    sodium bicarbonate 650 MG tablet Take 650 mg by mouth once daily.    vilazodone (VIIBRYD) 40 mg Tab tablet Take 40 mg by mouth once daily.    [DISCONTINUED] colchicine 0.6 mg tablet Take 0.6 mg by mouth once daily.     Family History     Problem Relation (Age of Onset)    Diabetes Mother, Sister    Heart disease Brother    Lupus Sister        Social History Main Topics    Smoking status: Never Smoker    Smokeless tobacco: Never Used    Alcohol use No    Drug use: No    Sexual activity: No     Review of Systems   Constitutional: Positive for  fatigue. Negative for activity change, appetite change, chills and fever.   HENT: Negative for congestion, postnasal drip, sinus pressure, sore throat and trouble swallowing.    Eyes: Positive for visual disturbance (seeing spots today). Negative for photophobia.   Respiratory: Positive for shortness of breath (chronic, unchanged). Negative for cough, chest tightness and wheezing.    Cardiovascular: Positive for chest pain. Negative for palpitations and leg swelling.   Gastrointestinal: Positive for nausea. Negative for abdominal distention, abdominal pain, blood in stool, constipation, diarrhea and vomiting.   Endocrine: Positive for polydipsia. Negative for polyphagia and polyuria.   Genitourinary: Positive for dysuria. Negative for difficulty urinating, flank pain, frequency and hematuria.   Musculoskeletal: Positive for back pain. Negative for arthralgias, myalgias and neck pain.   Skin: Negative for color change.   Neurological: Positive for headaches. Negative for dizziness, weakness and light-headedness.   Psychiatric/Behavioral: Negative for agitation and confusion. The patient is not nervous/anxious.      Objective:     Vital Signs (Most Recent):  Temp: 98 °F (36.7 °C) (04/12/17 1610)  Pulse: 73 (04/12/17 1902)  Resp: 16 (04/12/17 1610)  BP: (!) 199/88 (04/12/17 2329)  SpO2: 97 % (04/12/17 1902) Vital Signs (24h Range):  Temp:  [98 °F (36.7 °C)] 98 °F (36.7 °C)  Pulse:  [] 73  Resp:  [16] 16  SpO2:  [93 %-98 %] 97 %  BP: (160-199)/(70-88) 199/88     Weight: 105.2 kg (232 lb)  Body mass index is 36.34 kg/(m^2).    Physical Exam   Constitutional: She is oriented to person, place, and time. She appears well-developed and well-nourished. No distress.   HENT:   Head: Normocephalic and atraumatic.   Eyes: Conjunctivae and EOM are normal. Pupils are equal, round, and reactive to light.   Neck: Normal range of motion. Neck supple. No thyromegaly present.   Cardiovascular: Normal rate, regular rhythm, normal  heart sounds and intact distal pulses.    Pulmonary/Chest: Effort normal and breath sounds normal. No respiratory distress.   Abdominal: Soft. Bowel sounds are normal. She exhibits no distension. There is no tenderness.   Musculoskeletal: Normal range of motion. She exhibits no edema.   No CVA tenderness   Neurological: She is alert and oriented to person, place, and time. No cranial nerve deficit.   Skin: Skin is warm and dry.   Psychiatric: She has a normal mood and affect. Her behavior is normal. Judgment and thought content normal.        Significant Labs:   CBC:   Recent Labs  Lab 04/12/17  1716   WBC 15.90*   HGB 9.4*   HCT 28.5*        CMP:   Recent Labs  Lab 04/12/17  1758      K 6.1*      CO2 20*   *   BUN 62*   CREATININE 2.7*   CALCIUM 9.1   PROT 7.0   ALBUMIN 3.5   BILITOT 0.2   ALKPHOS 205*   AST 14   ALT 18   ANIONGAP 9   EGFRNONAA 19*     TSH:   Recent Labs  Lab 03/27/17  2048   TSH 0.024*     Urine Studies:   Recent Labs  Lab 04/12/17  1716   COLORU Yellow   APPEARANCEUA Clear   PHUR 6.0   SPECGRAV 1.010   PROTEINUA 1+*   GLUCUA 4+*   KETONESU Negative   BILIRUBINUA Negative   OCCULTUA 1+*   NITRITE Negative   UROBILINOGEN Negative   LEUKOCYTESUR Trace*   RBCUA 1   WBCUA 7*   BACTERIA Rare   SQUAMEPITHEL 3   HYALINECASTS 0       Significant Imaging:  EKG: NSR 95 with some increase in T wave amplitude (my read)

## 2017-04-13 NOTE — ASSESSMENT & PLAN NOTE
IDDM with poor control, acutely worsened hyperglycemia with initiation of steroids.  Continue basal insulin as per home regimen, add moderate sliding scale insulin for hyperglycemia with blood glucose monitoring every four hours.  No sign of ketoacidosis.  Diabetic diet, check HgbA1c.

## 2017-04-13 NOTE — PLAN OF CARE
Problem: Patient Care Overview  Goal: Plan of Care Review  Outcome: Ongoing (interventions implemented as appropriate)  Fall and safety precautions maintained. 1/2 NS infusing at 150mL/hr. Will continue to monitor.

## 2017-04-13 NOTE — ASSESSMENT & PLAN NOTE
SACHIN vs. Worsening interstital nephritis. Continue IVF hydration. Follow renal panel and electrolytes closely. Consult Dr. Oneal- nephrologist of record. Adjust renal dose medications for creatinine clearance 10-50cc/min. Avoid NSAIDs, Vickers-II inhibitors, ACE-I, Angiotensin Receptor Blockers, or Aminoglycosides. Holding ARB. Check  Urine Na, Cr.

## 2017-04-13 NOTE — PLAN OF CARE
The pt was awake and alert and able to verify all info on the face sheet as correct. She is a readmit from 3/27/17. She lives at home with her spouse, Any [phone]. She does not have HH. She reports independence in ADL's however has a scooter and glucometer. She uses [facility] on [address]. She sees Dr. Alvarado and has Medicaid insurance. She has no questions or concerns at this time. Anastacia Jose, [facility]       04/13/17 1140   Discharge Assessment   Assessment Type Discharge Planning Assessment   Confirmed/corrected address and phone number on facesheet? Yes   Assessment information obtained from? Patient   Communicated expected length of stay with patient/caregiver no   Type of Healthcare Directive Received Durable power of  for health care  (Spouse Any Alvarez [phone])   If Healthcare Directive is received, is it scanned into Epic? no (comment)   Prior to hospitilization cognitive status: Alert/Oriented   Prior to hospitalization functional status: Independent   Current cognitive status: Alert/Oriented   Current Functional Status: Independent   Lives With spouse   Able to Return to Prior Arrangements yes   Is patient able to care for self after discharge? Yes   How many people do you have in your home that can help with your care after discharge? 1   Readmission Within The Last 30 Days other (see comments)  (pt was admitted 3/27/17)   Patient currently being followed by outpatient case management? No   Patient currently receives home health services? No   Does the patient currently use HME? Yes   Patient currently receives private duty nursing? No   Patient currently receives any other outside agency services? No   Equipment Currently Used at Home glucometer;other (see comments)  (scooter )   Do you have any problems affording any of your prescribed medications? No  ([facility] [place] )   Is the patient taking medications as prescribed? yes   Do you have any financial  concerns preventing you from receiving the healthcare you need? No  (Medicaid )   Does the patient have transportation to healthcare appointments? Yes   Transportation Available car   On Dialysis? No   Does the patient receive services at the [facility]mad[facility] Clinic? No   Are there any open cases? No   Discharge Plan A Home   Discharge Plan B Home with family   Patient/Family In Agreement With Plan yes

## 2017-04-13 NOTE — H&P
"[facility] Hospital Medicine  History & Physical    Patient Name: Andra Newell  MRN: [id number]  Admission Date: 4/12/2017  Attending Physician: Patrick Jordan MD   Primary Care Provider: Diony Alvarado MD         Patient information was obtained from patient and ER records.     Subjective:     Principal Problem:Hyperkalemia    Chief Complaint:   Chief Complaint   Patient presents with    Hyperglycemia    Back Pain     "My kidneys are inflammed"    Chest Pain        HPI: Andra Newell is a 57 y.o. Female with PMHx significant for IDDM, CKD, HTN, and hypothyroidism.  She was admitted to the service of hospital medicine with hyperkalemia.  She presented to the ED with complaint of hyperglycemia with BGs in the 400s despite twice daily basal insulin dosing.  She has noticed the elevated blood glucose since beginning prednisone for interstitial nephritis.  She states she was supposed to be getting short-acting insulin, but her insurance has not yet approved the additional insulin. Her elevated glucose levels are associated with increased thirst, as well as nausea and HA (both of which have improved since arrival to ED).  She reports slight dysuria, no changes in urination.  She endorses some chest heaviness that has been constant since 2 pm; she reports the heaviness has lessened but persists.  She has experienced similar symptoms before.  She is seen by Dr. Hudson and underwent a negative stress test 1.5 yrs ago.  Her labs in ED revealed hyperglycemia, worsening renal function, and hyperkalemia.  She was admitted to the service of hospital medicine for further evaluation and management.    Past Medical History:   Diagnosis Date    Arthritis     Asthma     allergic airway    Depression     Diabetes mellitus     Gout     Hypertension     Renal disorder     Thyroid disease        Past Surgical History:   Procedure Laterality Date    ACHILLES TENDON SURGERY Left May 2015    BACK " SURGERY      CHOLECYSTECTOMY      HEMORRHOID SURGERY      HIP SURGERY      JOINT REPLACEMENT      left    KNEE SURGERY      SHOULDER ARTHROSCOPY      SHOULDER SURGERY         Review of patient's allergies indicates:   Allergen Reactions    Codeine Itching     Can take oxycodone and hydrocodone with Benadryl       No current facility-administered medications on file prior to encounter.      Current Outpatient Prescriptions on File Prior to Encounter   Medication Sig    amlodipine (NORVASC) 5 MG tablet Take 5 mg by mouth once daily.    atorvastatin (LIPITOR) 20 MG tablet Take 20 mg by mouth once daily.    doxazosin (CARDURA) 2 MG tablet Take 2 mg by mouth every evening.    INSULIN GLARGINE,HUM.REC.ANLOG (LANTUS SUBQ) Inject 85 Units into the skin 2 (two) times daily.     irbesartan (AVAPRO) 150 MG tablet Take 150 mg by mouth every evening.    levothyroxine (SYNTHROID) 100 MCG tablet Take 75 mcg by mouth once daily.     loratadine (CLARITIN) 10 mg tablet Take 10 mg by mouth once daily.    omeprazole (PRILOSEC) 20 MG capsule Take 20 mg by mouth once daily.    sodium bicarbonate 650 MG tablet Take 650 mg by mouth once daily.    vilazodone (VIIBRYD) 40 mg Tab tablet Take 40 mg by mouth once daily.    [DISCONTINUED] colchicine 0.6 mg tablet Take 0.6 mg by mouth once daily.     Family History     Problem Relation (Age of Onset)    Diabetes Mother, Sister    Heart disease Brother    Lupus Sister        Social History Main Topics    Smoking status: Never Smoker    Smokeless tobacco: Never Used    Alcohol use No    Drug use: No    Sexual activity: No     Review of Systems   Constitutional: Positive for fatigue. Negative for activity change, appetite change, chills and fever.   HENT: Negative for congestion, postnasal drip, sinus pressure, sore throat and trouble swallowing.    Eyes: Positive for visual disturbance (seeing spots today). Negative for photophobia.   Respiratory: Positive for shortness of  breath (chronic, unchanged). Negative for cough, chest tightness and wheezing.    Cardiovascular: Positive for chest pain. Negative for palpitations and leg swelling.   Gastrointestinal: Positive for nausea. Negative for abdominal distention, abdominal pain, blood in stool, constipation, diarrhea and vomiting.   Endocrine: Positive for polydipsia. Negative for polyphagia and polyuria.   Genitourinary: Positive for dysuria. Negative for difficulty urinating, flank pain, frequency and hematuria.   Musculoskeletal: Positive for back pain. Negative for arthralgias, myalgias and neck pain.   Skin: Negative for color change.   Neurological: Positive for headaches. Negative for dizziness, weakness and light-headedness.   Psychiatric/Behavioral: Negative for agitation and confusion. The patient is not nervous/anxious.      Objective:     Vital Signs (Most Recent):  Temp: 98 °F (36.7 °C) (04/12/17 1610)  Pulse: 73 (04/12/17 1902)  Resp: 16 (04/12/17 1610)  BP: (!) 199/88 (04/12/17 2329)  SpO2: 97 % (04/12/17 1902) Vital Signs (24h Range):  Temp:  [98 °F (36.7 °C)] 98 °F (36.7 °C)  Pulse:  [] 73  Resp:  [16] 16  SpO2:  [93 %-98 %] 97 %  BP: (160-199)/(70-88) 199/88     Weight: 105.2 kg (232 lb)  Body mass index is 36.34 kg/(m^2).    Physical Exam   Constitutional: She is oriented to person, place, and time. She appears well-developed and well-nourished. No distress.   HENT:   Head: Normocephalic and atraumatic.   Eyes: Conjunctivae and EOM are normal. Pupils are equal, round, and reactive to light.   Neck: Normal range of motion. Neck supple. No thyromegaly present.   Cardiovascular: Normal rate, regular rhythm, normal heart sounds and intact distal pulses.    Pulmonary/Chest: Effort normal and breath sounds normal. No respiratory distress.   Abdominal: Soft. Bowel sounds are normal. She exhibits no distension. There is no tenderness.   Musculoskeletal: Normal range of motion. She exhibits no edema.   No CVA tenderness    Neurological: She is alert and oriented to person, place, and time. No cranial nerve deficit.   Skin: Skin is warm and dry.   Psychiatric: She has a normal mood and affect. Her behavior is normal. Judgment and thought content normal.        Significant Labs:   CBC:   Recent Labs  Lab 04/12/17  1716   WBC 15.90*   HGB 9.4*   HCT 28.5*        CMP:   Recent Labs  Lab 04/12/17  1758      K 6.1*      CO2 20*   *   BUN 62*   CREATININE 2.7*   CALCIUM 9.1   PROT 7.0   ALBUMIN 3.5   BILITOT 0.2   ALKPHOS 205*   AST 14   ALT 18   ANIONGAP 9   EGFRNONAA 19*     TSH:   Recent Labs  Lab 03/27/17  2048   TSH 0.024*     Urine Studies:   Recent Labs  Lab 04/12/17  1716   COLORU Yellow   APPEARANCEUA Clear   PHUR 6.0   SPECGRAV 1.010   PROTEINUA 1+*   GLUCUA 4+*   KETONESU Negative   BILIRUBINUA Negative   OCCULTUA 1+*   NITRITE Negative   UROBILINOGEN Negative   LEUKOCYTESUR Trace*   RBCUA 1   WBCUA 7*   BACTERIA Rare   SQUAMEPITHEL 3   HYALINECASTS 0       Significant Imaging:  EKG: NSR 95 with some increase in T wave amplitude (my read)    Assessment/Plan:     * Hyperkalemia  Monitor on telemetry.  Will give IV insulin, Kayexalate, Ca gluconate. Follow potassium level.      SACHIN on CKD 4  SACHIN vs. Worsening interstital nephritis. Continue IVF hydration. Follow renal panel and electrolytes closely. Consult Dr. Oneal- nephrologist of record. Adjust renal dose medications for creatinine clearance 10-50cc/min. Avoid NSAIDs, Vickers-II inhibitors, ACE-I, Angiotensin Receptor Blockers, or Aminoglycosides. Holding ARB. Check  Urine Na, Cr.       Uncontrolled type 2 diabetes mellitus with hyperglycemia  IDDM with poor control, acutely worsened hyperglycemia with initiation of steroids.  Continue basal insulin as per home regimen, add moderate sliding scale insulin for hyperglycemia with blood glucose monitoring every four hours.  No sign of ketoacidosis.  Diabetic diet, check HgbA1c.       Interstitial  nephritis  Under the care of Dr. Oneal, recently began prednisone.  Will continue prednisone with tight glycemic control. Nephrology to see.      Essential hypertension  Chronic, acutely hypertensive-- likely 2/2 alteration in medication including D/C of ACEI with worsening renal function.  Holding ARB with SACHIN.  Increase amlodopine, treat with PRN hydralazine.  Consider additional oral medications to better regulate BP.  Monitor BP closely, titrate medications for sustained BP control.    Chest Pain  Possibly due to HTN.  Will check troponin, monitor on telemetry.    Bilateral low back pain without sciatica  Ongoing problem, improving.  PRN narcotics for pain control.  Monitor closely.    UTI  UA not overly concerning for UTI with trace LE, 7 WBC, rare bacteria, and nitrate -, however given symptoms of dysuria will treat with rocephin and send urine for culture.    VTE Risk Mitigation         Ordered     heparin (porcine) injection 5,000 Units  Every 8 hours     Route:  Subcutaneous        04/12/17 2147     Mod Risk of VTE  Once      04/12/17 2147   Peptic ulcer prophylaxis: Protonix     Maite Gambino NP  Department of Hospital Medicine   [facility]

## 2017-04-13 NOTE — PLAN OF CARE
Problem: Patient Care Overview  Goal: Plan of Care Review  Outcome: Ongoing (interventions implemented as appropriate)  Pt admitted with hyperglycemia', hyperkalemia, and hypertension. Cbg at admission was 368. This am down to 144. Likewise potassium on admit was 6.1. At 0600 was 4.8. Blood pressure was 175/82 at end of night.

## 2017-04-13 NOTE — ASSESSMENT & PLAN NOTE
Under the care of Dr. Oneal, recently began prednisone.  Will continue prednisone with tight glycemic control. Nephrology to see.

## 2017-04-13 NOTE — ED PROVIDER NOTES
"Encounter Date: 4/12/2017       History     Chief Complaint   Patient presents with    Hyperglycemia    Back Pain     "My kidneys are inflammed"    Chest Pain     Review of patient's allergies indicates:   Allergen Reactions    Codeine Itching     Can take oxycodone and hydrocodone with Benadryl     HPI Comments: Andra Newell is a 57 y.o. Female presenting for evaluation of elevated blood sugar, headache, chest pressure and back pain since earlier today.  No fever, no chills.  She has associated dysuria, but no hematuria.  She has a history of IDDM and has been taking her insulin as prescribed; however, she has had higher blood sugars recently because she is taking Prednisone.  History of chronic kidney disease.  Some associated nausea, but no vomiting.  No chest pain or palpitations.  No shortness of breath or difficulty breathing.     The history is provided by the patient.     Past Medical History:   Diagnosis Date    Arthritis     Asthma     allergic airway    Depression     Diabetes mellitus     Gout     Hypertension     Renal disorder     Thyroid disease      Past Surgical History:   Procedure Laterality Date    ACHILLES TENDON SURGERY Left May 2015    BACK SURGERY      CHOLECYSTECTOMY      HEMORRHOID SURGERY      HIP SURGERY      JOINT REPLACEMENT      left    KNEE SURGERY      SHOULDER ARTHROSCOPY      SHOULDER SURGERY       Family History   Problem Relation Age of Onset    Diabetes Mother     Diabetes Sister     Lupus Sister     Heart disease Brother      Social History   Substance Use Topics    Smoking status: Never Smoker    Smokeless tobacco: Never Used    Alcohol use No     Review of Systems   Constitutional: Negative for chills and fever.   HENT: Negative for congestion and sore throat.    Eyes: Negative for discharge.   Respiratory: Negative for cough, chest tightness, shortness of breath and wheezing.    Cardiovascular: Negative for chest pain (chest pressure) and " palpitations.   Gastrointestinal: Positive for nausea. Negative for abdominal pain, diarrhea and vomiting.   Genitourinary: Positive for dysuria. Negative for hematuria.   Musculoskeletal: Positive for myalgias. Negative for arthralgias, back pain, joint swelling, neck pain and neck stiffness.   Skin: Negative for color change, pallor, rash and wound.   Neurological: Positive for headaches. Negative for dizziness, syncope, weakness, light-headedness and numbness.   Hematological: Does not bruise/bleed easily.   Psychiatric/Behavioral: The patient is not nervous/anxious.        Physical Exam   Initial Vitals   BP Pulse Resp Temp SpO2   04/12/17 1610 04/12/17 1610 04/12/17 1610 04/12/17 1610 04/12/17 1610   199/88 106 16 98 °F (36.7 °C) 93 %     Physical Exam    Nursing note and vitals reviewed.  Constitutional: She appears well-developed and well-nourished. She is not diaphoretic. No distress.   HENT:   Head: Normocephalic and atraumatic.   Right Ear: External ear normal.   Left Ear: External ear normal.   Nose: Nose normal.   Mouth/Throat: Oropharynx is clear and moist.   Eyes: Conjunctivae and EOM are normal. Pupils are equal, round, and reactive to light.   Neck: Normal range of motion. Neck supple.   Cardiovascular: Normal rate, regular rhythm, normal heart sounds and intact distal pulses.   Pulmonary/Chest: Breath sounds normal. No respiratory distress. She has no wheezes. She has no rhonchi. She has no rales.   Equal, bilateral breath sounds noted without wheezing.    Abdominal: Soft. She exhibits no distension and no mass. There is no tenderness.   No palpable abdominal tenderness noted.    Musculoskeletal: Normal range of motion. She exhibits no edema or tenderness.   Lymphadenopathy:     She has no cervical adenopathy.   Neurological: She is alert and oriented to person, place, and time. She has normal strength. No cranial nerve deficit or sensory deficit.   Skin: Skin is warm and dry. No rash and no abscess  noted. No erythema.         ED Course   Procedures  Labs Reviewed   CBC W/ AUTO DIFFERENTIAL - Abnormal; Notable for the following:        Result Value    WBC 15.90 (*)     RBC 3.32 (*)     Hemoglobin 9.4 (*)     Hematocrit 28.5 (*)     RDW 15.5 (*)     MPV 8.2 (*)     Gran # 14.4 (*)     Mono # 0.2 (*)     Gran% 91.1 (*)     Lymph% 7.3 (*)     Mono% 1.0 (*)     All other components within normal limits   URINALYSIS - Abnormal; Notable for the following:     Protein, UA 1+ (*)     Glucose, UA 4+ (*)     Occult Blood UA 1+ (*)     Leukocytes, UA Trace (*)     All other components within normal limits   URINALYSIS MICROSCOPIC - Abnormal; Notable for the following:     WBC, UA 7 (*)     All other components within normal limits   COMPREHENSIVE METABOLIC PANEL - Abnormal; Notable for the following:     Potassium 6.1 (*)     CO2 20 (*)     Glucose 473 (*)     BUN, Bld 62 (*)     Creatinine 2.7 (*)     Alkaline Phosphatase 205 (*)     eGFR if  21 (*)     eGFR if non  19 (*)     All other components within normal limits    Narrative:     glucose   critical result(s) called and verbal readback obtained from   Amauri Queen, 04/12/2017 18:30   POCT GLUCOSE - Abnormal; Notable for the following:     POCT Glucose 486 (*)     All other components within normal limits   POCT GLUCOSE MONITORING CONTINUOUS             Medical Decision Making:   Clinical Tests:   Lab Tests: Ordered and Reviewed  Medical Tests: Ordered and Reviewed       APC / Resident Notes:   Pt has hyperkalemia and elevated creatinine from baseline.  No EKG changes as a result of the hyperkalemia.  We do feel admission to the hospital is indicated for further evaluation and treatment.  At patient's request, we attempted to speak with Dr. Oneal, her nephrologist, but were unsuccessful.  Voicemail was left for Dr. Garduno.  Hospitalist agrees to admission.  Pt voices understanding and is agreeable to the plan.                ED Course    Value Comment By Time    EKG:  NSR, rate of 95, normal intervals, L axis.  There are no acute ST or T wave changes suggestive of acute ischemia or infarction. Tristan Garcia MD 04/12 1710   POCT Glucose: (!!) 486 Pt will be given IV fluids. Brook Saucedo PA-C 04/12 1751    Call placed to nephrology with Dr. Oneal at patient's request. Tristan Garcia MD 04/12 1814   Potassium: (!) 6.1 (Reviewed) Brook Saucedo PA-C 04/12 1919   Glucose: (!!) 473 (Reviewed) Brook Saucedo PA-C 04/12 1919   Creatinine: (!) 2.7 (Reviewed) Brook Saucedo PA-C 04/12 1919     Clinical Impression:   The primary encounter diagnosis was Hyperglycemia. Diagnoses of Chest pain and Hyperkalemia were also pertinent to this visit.          Brook Saucedo PA-C  04/12/17 2037

## 2017-04-13 NOTE — CONSULTS
Consult Note  Nephrology    Consult Requested By: Patrick Jordan MD    Reason for Consult: Second opinion requested by patient    SUBJECTIVE:     History of Present Illness: 57F with h/o CKD 4 with what appears baseline sCr 2.1 followed by Dr. Oneal and Dr. Garduno, and recent episode of biopsy proven AIN (?Allopurinol related), treated with Cellcept initially (due to concerns for DM exacerbation). Per patient, she was unable to tolerate and was subsequently started on steroids. She was hospitalized last night with hyperkalemia, hyperglycemia, SACHIN, HTN urgency. She was also treated for suspected UTI but Cx so far remains negative. Her SACHIN and hyperkalemia improved overnight with provided management. She is reportedly planned for discharge tomorrow.    Past Medical History:   Diagnosis Date    Arthritis     Asthma     allergic airway    Depression     Diabetes mellitus     Gout     Hypertension     Renal disorder     Thyroid disease      Past Surgical History:   Procedure Laterality Date    ACHILLES TENDON SURGERY Left May 2015    BACK SURGERY      CHOLECYSTECTOMY      HEMORRHOID SURGERY      HIP SURGERY      JOINT REPLACEMENT      left    KNEE SURGERY      SHOULDER ARTHROSCOPY      SHOULDER SURGERY       Family History   Problem Relation Age of Onset    Diabetes Mother     Diabetes Sister     Lupus Sister     Heart disease Brother      Social History   Substance Use Topics    Smoking status: Never Smoker    Smokeless tobacco: Never Used    Alcohol use No       Review of patient's allergies indicates:   Allergen Reactions    Codeine Itching     Can take oxycodone and hydrocodone with Benadryl        Review of Systems:  General ROS: negative for - fever or night sweats  Psychological ROS: negative for - behavioral disorder or depression  ENT ROS: negative for - headaches or visual changes  Hematological and Lymphatic ROS: negative for - bleeding problems or bruising  Endocrine ROS: negative for -  temperature intolerance or unexpected weight changes  Respiratory ROS: no cough, shortness of breath, or wheezing  Cardiovascular ROS: no chest pain or dyspnea on exertion  Gastrointestinal ROS: no abdominal pain, change in bowel habits, or black or bloody stools  Genito-Urinary ROS: no dysuria, trouble voiding, or hematuria  Musculoskeletal ROS: negative for - joint pain or joint swelling  Neurological ROS: no TIA or stroke symptoms  Dermatological ROS: negative for rash and skin lesion changes    OBJECTIVE:     Vital Signs Range (Last 24H):  Temp:  [96.8 °F (36 °C)-98.2 °F (36.8 °C)]   Pulse:  []   Resp:  [16-20]   BP: (160-227)/(70-88)   SpO2:  [93 %-98 %]     Physical Exam:  General- Patient alert and oriented x3 in NAD  HEENT- WNL  Neck- supple  CV- Regular rate and rhythm  Resp- No increased WOB  GI- Non tender/non-distended  Extrem- No cyanosis, clubbing, edema.  Derm- No rashes, masses, or lesions noted  Neuro-  No flap. No focal deficits noted.     Body mass index is 36.46 kg/(m^2).    Laboratory:  CBC:   Recent Labs  Lab 04/13/17 0425   WBC 13.90*   RBC 3.18*   HGB 8.8*   HCT 27.3*      MCV 86   MCH 27.7   MCHC 32.2     CMP:   Recent Labs  Lab 04/12/17 1758 04/13/17 0425   * 144*   CALCIUM 9.1 9.4   ALBUMIN 3.5  --    PROT 7.0  --     145   K 6.1* 4.8   CO2 20* 25    112*   BUN 62* 56*   CREATININE 2.7* 2.2*   ALKPHOS 205*  --    ALT 18  --    AST 14  --    BILITOT 0.2  --      LFTs:   Recent Labs  Lab 04/12/17 1758   ALT 18   AST 14   ALKPHOS 205*   BILITOT 0.2   PROT 7.0   ALBUMIN 3.5     Coagulation:   Recent Labs  Lab 04/13/17  0425   INR 1.1   APTT 25.2     Cardiac markers:   Recent Labs  Lab 04/13/17  1144   TROPONINI 0.015     ABGs: No results for input(s): PH, PCO2, PO2, HCO3, POCSATURATED, BE in the last 168 hours.  Microbiology Results (last 7 days)     Procedure Component Value Units Date/Time    Urine Culture [[id number]] Collected:  04/12/17 1715    Order  Status:  Sent Specimen:  Urine Updated:  04/13/17 0954        Specimen     None          Recent Labs  Lab 04/12/17  1716   COLORU Yellow   SPECGRAV 1.010   PHUR 6.0   PROTEINUA 1+*   BACTERIA Rare   NITRITE Negative   LEUKOCYTESUR Trace*   UROBILINOGEN Negative   HYALINECASTS 0       Diagnostic Results:  Labs: Reviewed  ECG: Reviewed    ASSESSMENT/PLAN:     Active Hospital Problems    Diagnosis  POA    *Hyperkalemia [E87.5]  Yes    Hyperglycemia [R73.9]  Yes    Uncontrolled type 2 diabetes mellitus with hyperglycemia [E11.65]  Yes    SACHIN on CKD 4 [N17.9]  Yes    Bilateral low back pain without sciatica [M54.5]  Yes    Interstitial nephritis [N12]  Yes    Essential hypertension [I10]  Yes    Chest pain [R07.9]  Yes      Resolved Hospital Problems    Diagnosis Date Resolved POA   No resolved problems to display.     SACHIN  CKD stage 4  DM2  HTN  Interstitial nephritis    Agree with current management and recommendations as provided by Dr. Garduno.  Her renal function seems to be recovering to what seems a baseline for her.    With respect to further using steroids in management of AIN, the data about using steroids in AIN is somewhat complicated.   While I certainly agree with use of immunosuppressive therapy acutely, in the face of rising sCr, significant eosinophilia, etc, it is not clear at this point if the benefits of steroids therapy outweigh the risks associated, including, in this particular case, worsening DM, HTN, etc.  I am inclined to stop or taper steroid therapy at this point - as Dr. Garduno suggests - and monitor clinically for recurrence.    Patient intends to transfer her care to [place] as she resides here and already made appointment this AM with our office to follow-up after discharge.    Please, provide on discharge a slip for repeat BMP, Ca, Phos, PTH, vitamin D level, CBC with diff, Urinalysis, urine protein and urine albumin before next visit.    If patient remains hospitalized beyond  tomorrow and you want us to follow, please, let me know.   I feel comfortable with the care that Dr. Garduno provides on this admission.    Thank you for allowing us to participate in the care of your patient.   We will follow the patient and provide recommendations as needed.

## 2017-04-13 NOTE — CONSULTS
Consult Note  Nephrology    Andra Nina Newell  female  57 y.o.  Consult Requested By: Patrick Jordan MD  Reason for Consult: Acute Kidney Injury    SUBJECTIVE:     History of Present Illness:  Ms. Newell is a 58 y/o woman with multiple medical problems including HTN, DM2 and CKD. She is admitted for hyperglycemia after experiencing weakness, dizziness and HA with accelerated HTN. She recently was diagnosed by renal biopsy with interstitial nephritis and initially was treated with Cellcept. That was discontinued during her last hospitalization after suspected toxicity. She saw my partner Dr. Oneal in clinic 2 days PTA. She was started on Prednisone 40 mg daily. She was cautioned to come to the ER if BS's increased. Her BS's increased to the 400's and she came in and was admitted. She also was found to have a UTI and has been started on antibiotics. She had a throbbing HA when her BP was up. She was lightheaded and lethargic as well. No fevers or chills and she has no UT symptoms. Nephrology Nephrology consulted for SACHIN on CKD.      Review Of Systems:  GEN:  No fever, chills, night sweats, decreased intake.  HEENT: [place]. No blurry vision, glasses, hearing defects, sore throat.  CV:  No CP, palpitations, edema, BOYER, orthopnea, PND.  PULM:  No Cough, SOB, hemoptysis, wheezing.   GI:  No nausea, vomiting, constipation, diarrhea, melena, hematochezia, abdominal pain.  :  No dysuria, urgency, frequency, cloudy urine, red urine, dribbling, double voiding, incontinence, hx of stones.  NEURO: Dizzy. No LOC, seizure activity.  SKIN:  No rash, pruritis, spots, sores.  MS:  No arthralgias, joint swelling, redness or warmth.    Past Medical History:   Diagnosis Date    Arthritis     Asthma     allergic airway    Depression     Diabetes mellitus     Gout     Hypertension     Renal disorder     Thyroid disease      Past Surgical History:   Procedure Laterality Date    ACHILLES TENDON SURGERY Left May 2015    BACK  SURGERY      CHOLECYSTECTOMY      HEMORRHOID SURGERY      HIP SURGERY      JOINT REPLACEMENT      left    KNEE SURGERY      SHOULDER ARTHROSCOPY      SHOULDER SURGERY       Family History   Problem Relation Age of Onset    Diabetes Mother     Diabetes Sister     Lupus Sister     Heart disease Brother      Social History   Substance Use Topics    Smoking status: Never Smoker    Smokeless tobacco: Never Used    Alcohol use No      Review of patient's allergies indicates:   Allergen Reactions    Codeine Itching     Can take oxycodone and hydrocodone with Benadryl      Prior to Admission medications    Medication Sig Start Date End Date Taking? Authorizing Provider   amlodipine (NORVASC) 5 MG tablet Take 5 mg by mouth once daily.   Yes Historical Provider, MD   atorvastatin (LIPITOR) 20 MG tablet Take 20 mg by mouth once daily.   Yes Historical Provider, MD   doxazosin (CARDURA) 2 MG tablet Take 2 mg by mouth every evening.   Yes Historical Provider, MD   INSULIN GLARGINE,HUM.REC.ANLOG (LANTUS SUBQ) Inject 85 Units into the skin 2 (two) times daily.    Yes Historical Provider, MD   irbesartan (AVAPRO) 150 MG tablet Take 150 mg by mouth every evening.   Yes Historical Provider, MD   levothyroxine (SYNTHROID) 100 MCG tablet Take 75 mcg by mouth once daily.    Yes Historical Provider, MD   loratadine (CLARITIN) 10 mg tablet Take 10 mg by mouth once daily.   Yes Historical Provider, MD   omeprazole (PRILOSEC) 20 MG capsule Take 20 mg by mouth once daily.   Yes Historical Provider, MD   predniSONE (DELTASONE) 20 MG tablet Take 40 mg by mouth once daily. For 10 days, started 4/10/17, ended 4/19/17 4/10/17 4/19/17 Yes Historical Provider, MD   sodium bicarbonate 650 MG tablet Take 650 mg by mouth once daily.   Yes Historical Provider, MD   vilazodone (VIIBRYD) 40 mg Tab tablet Take 40 mg by mouth once daily.   Yes Historical Provider, MD           sodium chloride 0.45%         amlodipine  10 mg Oral Daily     atorvastatin  20 mg Oral Daily    cefTRIAXone (ROCEPHIN) IVPB  1 g Intravenous Q24H    doxazosin  4 mg Oral QHS    glimepiride  2 mg Oral Daily with breakfast    heparin (porcine)  5,000 Units Subcutaneous Q8H    insulin detemir  80 Units Subcutaneous BID    levothyroxine  100 mcg Oral Before breakfast    pantoprazole  40 mg Oral Daily    predniSONE  40 mg Oral Daily    sodium bicarbonate  650 mg Oral Daily     acetaminophen, dextrose 50%, dextrose 50%, diphenhydrAMINE, glucagon (human recombinant), glucose, glucose, hydrALAZINE, hydrocodone-acetaminophen 5-325mg, insulin aspart, ondansetron       OBJECTIVE:     Vital Signs (Most Recent)  Temp: 98.2 °F (36.8 °C) (04/13/17 0721)  Pulse: 78 (04/13/17 0721)  Resp: 18 (04/13/17 0721)  BP: (!) 175/81 (04/13/17 0721)  SpO2: 97 % (04/13/17 0721)    Vital Signs Range (Last 24H):  Temp:  [96.8 °F (36 °C)-98.2 °F (36.8 °C)]   Pulse:  []   Resp:  [16-18]   BP: (160-227)/(70-88)   SpO2:  [93 %-98 %]     Physical Exam:  General: Awake and alert. In no distress. Anxious but her baseline.  HEENT: No scleral icterus, MM moist.   NECK:  No JVD. Supple,  No swelling, No masses.  CV:  RRR without murmurs, rubs, gallops.  PULM:  Clear without crackles, rhonchi, wheezes.  ABD:  Soft, nl BS, NT, ND.  EXTR:  No edema, clubbing, cyanosis.   NEURO: Grossly Intact.  SKIN:  No rashes, lesions.   MS:  No joint effusions.   PSYCH: Normal Mood.    Laboratory:    Recent Labs  Lab 04/12/17  1758 04/13/17  0425    145   K 6.1* 4.8    112*   CO2 20* 25   BUN 62* 56*   CREATININE 2.7* 2.2*   * 144*   CALCIUM 9.1 9.4   PHOS  --  4.4         Recent Labs  Lab 04/12/17  1716 04/13/17  0425   WBC 15.90* 13.90*   HGB 9.4* 8.8*   HCT 28.5* 27.3*    181       Active Hospital Problems    Diagnosis  POA    *Hyperkalemia [E87.5]  Yes    Hyperglycemia [R73.9]  Yes    Uncontrolled type 2 diabetes mellitus with hyperglycemia [E11.65]  Yes    SACHIN on CKD 4 [N17.9]  Yes     Bilateral low back pain without sciatica [M54.5]  Yes    Interstitial nephritis [N12]  Yes    Essential hypertension [I10]  Yes    Chest pain [R07.9]  Yes      Resolved Hospital Problems    Diagnosis Date Resolved POA   No resolved problems to display.       ASSESSMENT/PLAN:   1. SACHIN on CKD. Suspect some IVVD from hyperglycemia and osmotic diuresis. Continue with IVF's.  2. Interstitial nephritis with eosinophilia. Improved off Allopurinol. Continue to hold. Steroids at lower dose?  3. Anemia. Near goal. No thrombocytopenia to suggest hemolytic process.  4. HTN.some anxiety provoked. Adjust meds if persists.  5. HyperK. Resolved with Kayexalate. Monitor.  6. HyperNa. Change to 1/2 NS from NS.     Thanks Dr. Jordan for the consult. Will follow up.     Bogdan Garduno M.D.

## 2017-04-14 VITALS
TEMPERATURE: 98 F | DIASTOLIC BLOOD PRESSURE: 71 MMHG | WEIGHT: 231 LBS | RESPIRATION RATE: 18 BRPM | BODY MASS INDEX: 36.26 KG/M2 | OXYGEN SATURATION: 97 % | HEIGHT: 67 IN | SYSTOLIC BLOOD PRESSURE: 159 MMHG | HEART RATE: 79 BPM

## 2017-04-14 LAB
ANION GAP SERPL CALC-SCNC: 11 MMOL/L
BACTERIA UR CULT: NO GROWTH
BASOPHILS # BLD AUTO: 0 K/UL
BASOPHILS NFR BLD: 0.3 %
BUN SERPL-MCNC: 52 MG/DL
CALCIUM SERPL-MCNC: 8.8 MG/DL
CHLORIDE SERPL-SCNC: 111 MMOL/L
CO2 SERPL-SCNC: 23 MMOL/L
CREAT SERPL-MCNC: 2.1 MG/DL
DIFFERENTIAL METHOD: ABNORMAL
EOSINOPHIL # BLD AUTO: 0 K/UL
EOSINOPHIL NFR BLD: 0 %
ERYTHROCYTE [DISTWIDTH] IN BLOOD BY AUTOMATED COUNT: 15.9 %
EST. GFR  (AFRICAN AMERICAN): 29 ML/MIN/1.73 M^2
EST. GFR  (NON AFRICAN AMERICAN): 25 ML/MIN/1.73 M^2
GLUCOSE SERPL-MCNC: 100 MG/DL
HCT VFR BLD AUTO: 29.2 %
HGB BLD-MCNC: 9.3 G/DL
LYMPHOCYTES # BLD AUTO: 2.7 K/UL
LYMPHOCYTES NFR BLD: 19.1 %
MAGNESIUM SERPL-MCNC: 2.2 MG/DL
MCH RBC QN AUTO: 27.4 PG
MCHC RBC AUTO-ENTMCNC: 31.7 %
MCV RBC AUTO: 86 FL
MONOCYTES # BLD AUTO: 0.8 K/UL
MONOCYTES NFR BLD: 5.6 %
NEUTROPHILS # BLD AUTO: 10.7 K/UL
NEUTROPHILS NFR BLD: 75 %
PHOSPHATE SERPL-MCNC: 4.9 MG/DL
PLATELET # BLD AUTO: 202 K/UL
PMV BLD AUTO: 7.9 FL
POCT GLUCOSE: 172 MG/DL (ref 70–110)
POTASSIUM SERPL-SCNC: 4.5 MMOL/L
RBC # BLD AUTO: 3.38 M/UL
SODIUM SERPL-SCNC: 145 MMOL/L
WBC # BLD AUTO: 14.3 K/UL

## 2017-04-14 PROCEDURE — 85025 COMPLETE CBC W/AUTO DIFF WBC: CPT

## 2017-04-14 PROCEDURE — 84100 ASSAY OF PHOSPHORUS: CPT

## 2017-04-14 PROCEDURE — 99239 HOSP IP/OBS DSCHRG MGMT >30: CPT | Mod: ,,, | Performed by: INTERNAL MEDICINE

## 2017-04-14 PROCEDURE — 63600175 PHARM REV CODE 636 W HCPCS: Performed by: INTERNAL MEDICINE

## 2017-04-14 PROCEDURE — 80048 BASIC METABOLIC PNL TOTAL CA: CPT

## 2017-04-14 PROCEDURE — 36415 COLL VENOUS BLD VENIPUNCTURE: CPT

## 2017-04-14 PROCEDURE — 25000003 PHARM REV CODE 250: Performed by: INTERNAL MEDICINE

## 2017-04-14 PROCEDURE — 25000003 PHARM REV CODE 250: Performed by: EMERGENCY MEDICINE

## 2017-04-14 PROCEDURE — 25000003 PHARM REV CODE 250: Performed by: NURSE PRACTITIONER

## 2017-04-14 PROCEDURE — 83735 ASSAY OF MAGNESIUM: CPT

## 2017-04-14 RX ORDER — GLIMEPIRIDE 2 MG/1
2 TABLET ORAL
Qty: 30 TABLET | Refills: 0 | Status: ON HOLD | OUTPATIENT
Start: 2017-04-14 | End: 2017-09-06 | Stop reason: HOSPADM

## 2017-04-14 RX ORDER — PREDNISONE 20 MG/1
TABLET ORAL
Start: 2017-04-14 | End: 2017-07-12

## 2017-04-14 RX ORDER — AMLODIPINE BESYLATE 10 MG/1
10 TABLET ORAL DAILY
Qty: 30 TABLET | Refills: 0 | Status: SHIPPED | OUTPATIENT
Start: 2017-04-14 | End: 2017-07-12

## 2017-04-14 RX ORDER — LEVOTHYROXINE SODIUM 100 UG/1
100 TABLET ORAL
Qty: 30 TABLET | Refills: 0 | Status: SHIPPED | OUTPATIENT
Start: 2017-04-14 | End: 2017-07-12 | Stop reason: DRUGHIGH

## 2017-04-14 RX ORDER — CIPROFLOXACIN 250 MG/1
250 TABLET, FILM COATED ORAL 2 TIMES DAILY
Qty: 6 TABLET | Refills: 0 | Status: SHIPPED | OUTPATIENT
Start: 2017-04-14 | End: 2017-04-17

## 2017-04-14 RX ADMIN — LEVOTHYROXINE SODIUM 100 MCG: 100 TABLET ORAL at 06:04

## 2017-04-14 RX ADMIN — PREDNISONE 20 MG: 20 TABLET ORAL at 07:04

## 2017-04-14 RX ADMIN — PANTOPRAZOLE SODIUM 40 MG: 40 TABLET, DELAYED RELEASE ORAL at 07:04

## 2017-04-14 RX ADMIN — AMLODIPINE BESYLATE 10 MG: 5 TABLET ORAL at 07:04

## 2017-04-14 RX ADMIN — HEPARIN SODIUM 5000 UNITS: 5000 INJECTION, SOLUTION INTRAVENOUS; SUBCUTANEOUS at 06:04

## 2017-04-14 RX ADMIN — INSULIN ASPART 2 UNITS: 100 INJECTION, SOLUTION INTRAVENOUS; SUBCUTANEOUS at 11:04

## 2017-04-14 RX ADMIN — SODIUM BICARBONATE 650 MG TABLET 650 MG: at 07:04

## 2017-04-14 RX ADMIN — INSULIN DETEMIR 80 UNITS: 100 INJECTION, SOLUTION SUBCUTANEOUS at 07:04

## 2017-04-14 RX ADMIN — GLIMEPIRIDE 2 MG: 2 TABLET ORAL at 07:04

## 2017-04-14 RX ADMIN — ATORVASTATIN CALCIUM 20 MG: 20 TABLET, FILM COATED ORAL at 07:04

## 2017-04-14 NOTE — PLAN OF CARE
Problem: Patient Care Overview  Goal: Plan of Care Review  Outcome: Ongoing (interventions implemented as appropriate)  Pt safe, Pt free from falls or injury, Pts BG monitored, pts VS stable, Pt pleasant, Pt cooperative, Pt denies pain , Pt denies N/V. Call light in reach, bed in low position, wheels locked. Pt verbalized understanding plan of care

## 2017-04-14 NOTE — DISCHARGE SUMMARY
Discharge Summary  Hospital Medicine    Admit Date: 4/12/2017    Date and Time: 4/14/20172:41 PM    Discharge Attending Physician: Patrick Jordan MD    Primary Care Physician: Diony Alvarado MD    Diagnoses:  Active Hospital Problems    Diagnosis  POA    *Hyperkalemia [E87.5]  Yes    Hyperglycemia [R73.9]  Yes    Uncontrolled type 2 diabetes mellitus with hyperglycemia [E11.65]  Yes    SACHIN on CKD 4 [N17.9]  Yes    Bilateral low back pain without sciatica [M54.5]  Yes    Interstitial nephritis [N12]  Yes    Essential hypertension [I10]  Yes    Chest pain [R07.9]  Yes      Resolved Hospital Problems    Diagnosis Date Resolved POA   No resolved problems to display.     Discharged Condition: Good    Hospital Course:   Ms. Newell is a 56yo WF with a H HTN, DM2, Hypothyroidism, Depression, and CKD.  is her nephrologist and she was recently diagnosed with interstitial nephritis. She presented to the hospital today with c/o CP, HA, SOB, fatigue, nausea, and low back. The back pain started while she was an appointment at her PCP. It is on both sides of her low back and continuous. Nothing made it worst or better. The chest pain was sharp, left sided CP, and has been intermittent all day today. Her HA is occipital and started gradually after taking a nap. Nothing made it worst or better. Her nephrologist recommended stopping Cellcept and giving IVF. She had been on Cellcept for interstitial nephritis, and today is day 12 of 30. She is unable to take steroids due to her uncontrolled DM. Her appetite is good, but she does not drink enough water. Patient was admitted to Hospitalist medicine service. Patient was given IVF hydration and hyperkalemia corrected. Patient was evaluated by Dr. Garduno and patient requested a second opinion. Patient was seen by Dr. Heaton. Renal panel improved. Patient noted to have elevated BP and norvasc increased. Patient educated on DM-2 control. Patient to do steroid taper and  have outpatient labs as per nephrology. Symptoms improved. Patient was discharged home in stable condition with following discharge plan of care. Total time with the patient was 30 minutes and greater than 50% was spent in counseling and coordination of care. The assessment and plan have been discussed at length. Physicians' notes reviewed. Labs and procedure reviewed.     Consults: Dr. Heaton and Dr. Garduno    Significant Diagnostic Studies:     Microbiology Results (last 7 days)     Procedure Component Value Units Date/Time    Urine Culture [[id number]] Collected:  04/12/17 1715    Order Status:  Completed Specimen:  Urine Updated:  04/14/17 1038     Urine Culture, Routine No growth        Special Treatments/Procedures: None  Disposition: Home or Self Care    Medications:  Reconciled Home Medications: Current Discharge Medication List      START taking these medications    Details   ciprofloxacin HCl (CIPRO) 250 MG tablet Take 1 tablet (250 mg total) by mouth 2 (two) times daily.  Qty: 6 tablet, Refills: 0      glimepiride (AMARYL) 2 MG tablet Take 1 tablet (2 mg total) by mouth daily with breakfast.  Qty: 30 tablet, Refills: 0      !! predniSONE (DELTASONE) 20 MG tablet Take 20 mg prednisone daily for 2 days then 10 mg daily for 2 days and then STOP.       !! - Potential duplicate medications found. Please discuss with provider.      CONTINUE these medications which have CHANGED    Details   !! amlodipine (NORVASC) 10 MG tablet Take 1 tablet (10 mg total) by mouth once daily.  Qty: 30 tablet, Refills: 0      levothyroxine (SYNTHROID) 100 MCG tablet Take 1 tablet (100 mcg total) by mouth before breakfast.  Qty: 30 tablet, Refills: 0       !! - Potential duplicate medications found. Please discuss with provider.      CONTINUE these medications which have NOT CHANGED    Details   !! amlodipine (NORVASC) 5 MG tablet Take 5 mg by mouth once daily.      atorvastatin (LIPITOR) 20 MG tablet Take 20 mg by mouth once  daily.      doxazosin (CARDURA) 2 MG tablet Take 2 mg by mouth every evening.      INSULIN GLARGINE,HUM.REC.ANLOG (LANTUS SUBQ) Inject 85 Units into the skin 2 (two) times daily.       irbesartan (AVAPRO) 150 MG tablet Take 150 mg by mouth every evening.      loratadine (CLARITIN) 10 mg tablet Take 10 mg by mouth once daily.      omeprazole (PRILOSEC) 20 MG capsule Take 20 mg by mouth once daily.      !! predniSONE (DELTASONE) 20 MG tablet Take 40 mg by mouth once daily. For 10 days, started 4/10/17, ended 4/19/17      sodium bicarbonate 650 MG tablet Take 650 mg by mouth once daily.      vilazodone (VIIBRYD) 40 mg Tab tablet Take 40 mg by mouth once daily.       !! - Potential duplicate medications found. Please discuss with provider.          Discharge Procedure Orders  Diet general   Order Comments: Cardiac/ 2 gram sodium low cholesterol diet     Diet Diabetic 1800 Calories     Other restrictions (specify):   Order Comments: Fall precautions     Call MD for:   Order Comments: For worsening symptoms, chest pain, shortness of breath, increased abdominal pain, high grade fever, stroke or stroke like symptoms, immediately go to the nearest Emergency Room or call 911 as soon as possible.       Follow-up Information     Follow up with Diony Alvarado MD In 1 week.    Specialty:  Internal Medicine    Contact information:    [address] [place] [address] [address] [place] [address] [phone]          Follow up with Dalton Heaton MD In 1 week.    Specialty:  Nephrology    Contact information:    [address] [facility]  [place] [address] [phone]          Please follow up.    Contact information:    Please keep blood sugar log closely.

## 2017-04-15 NOTE — PLAN OF CARE
04/15/17 0900   Final Note   Assessment Type Final Discharge Note   Discharge Disposition Home   Discharge planning education complete? Yes

## 2017-04-17 ENCOUNTER — PATIENT OUTREACH (OUTPATIENT)
Dept: ADMINISTRATIVE | Facility: CLINIC | Age: 57
End: 2017-04-17
Payer: MEDICAID

## 2017-04-26 ENCOUNTER — LAB VISIT (OUTPATIENT)
Dept: LAB | Facility: HOSPITAL | Age: 57
End: 2017-04-26
Attending: INTERNAL MEDICINE
Payer: MEDICAID

## 2017-04-26 DIAGNOSIS — N17.9 ACUTE KIDNEY FAILURE, UNSPECIFIED: Primary | ICD-10-CM

## 2017-04-26 LAB
25(OH)D3+25(OH)D2 SERPL-MCNC: 12 NG/ML
AMORPH CRY URNS QL MICRO: ABNORMAL
ANION GAP SERPL CALC-SCNC: 9 MMOL/L
BACTERIA #/AREA URNS HPF: ABNORMAL /HPF
BASOPHILS # BLD AUTO: 0.1 K/UL
BASOPHILS NFR BLD: 0.6 %
BILIRUB UR QL STRIP: NEGATIVE
BUN SERPL-MCNC: 42 MG/DL
CALCIUM SERPL-MCNC: 9.7 MG/DL
CALCIUM SERPL-MCNC: 9.7 MG/DL
CHLORIDE SERPL-SCNC: 113 MMOL/L
CLARITY UR: CLEAR
CO2 SERPL-SCNC: 21 MMOL/L
COLOR UR: YELLOW
CREAT SERPL-MCNC: 2.6 MG/DL
DIFFERENTIAL METHOD: ABNORMAL
EOSINOPHIL # BLD AUTO: 0.7 K/UL
EOSINOPHIL NFR BLD: 7.9 %
ERYTHROCYTE [DISTWIDTH] IN BLOOD BY AUTOMATED COUNT: 15.4 %
EST. GFR  (AFRICAN AMERICAN): 22 ML/MIN/1.73 M^2
EST. GFR  (NON AFRICAN AMERICAN): 19 ML/MIN/1.73 M^2
GLUCOSE SERPL-MCNC: 163 MG/DL
GLUCOSE UR QL STRIP: NEGATIVE
HCT VFR BLD AUTO: 30.1 %
HGB BLD-MCNC: 9.9 G/DL
HGB UR QL STRIP: ABNORMAL
HYALINE CASTS #/AREA URNS LPF: 0 /LPF
KETONES UR QL STRIP: NEGATIVE
LEUKOCYTE ESTERASE UR QL STRIP: NEGATIVE
LYMPHOCYTES # BLD AUTO: 1.8 K/UL
LYMPHOCYTES NFR BLD: 19.8 %
MCH RBC QN AUTO: 28 PG
MCHC RBC AUTO-ENTMCNC: 32.9 %
MCV RBC AUTO: 85 FL
MICROSCOPIC COMMENT: ABNORMAL
MONOCYTES # BLD AUTO: 0.4 K/UL
MONOCYTES NFR BLD: 4.8 %
NEUTROPHILS # BLD AUTO: 6.1 K/UL
NEUTROPHILS NFR BLD: 66.9 %
NITRITE UR QL STRIP: NEGATIVE
NON-SQ EPI CELLS #/AREA URNS HPF: 1 /HPF
PH UR STRIP: 5 [PH] (ref 5–8)
PHOSPHATE SERPL-MCNC: 4.6 MG/DL
PLATELET # BLD AUTO: 191 K/UL
PMV BLD AUTO: 7.6 FL
POTASSIUM SERPL-SCNC: 5.4 MMOL/L
PROT UR QL STRIP: ABNORMAL
PROT UR-MCNC: 78 MG/DL
PTH-INTACT SERPL-MCNC: 124 PG/ML
RBC # BLD AUTO: 3.55 M/UL
RBC #/AREA URNS HPF: 3 /HPF (ref 0–4)
SODIUM SERPL-SCNC: 143 MMOL/L
SP GR UR STRIP: 1.01 (ref 1–1.03)
SQUAMOUS #/AREA URNS HPF: 1 /HPF
URN SPEC COLLECT METH UR: ABNORMAL
UROBILINOGEN UR STRIP-ACNC: NEGATIVE EU/DL
WBC # BLD AUTO: 9.1 K/UL
WBC #/AREA URNS HPF: 2 /HPF (ref 0–5)

## 2017-04-26 PROCEDURE — 82043 UR ALBUMIN QUANTITATIVE: CPT

## 2017-04-26 PROCEDURE — 80048 BASIC METABOLIC PNL TOTAL CA: CPT

## 2017-04-26 PROCEDURE — 81000 URINALYSIS NONAUTO W/SCOPE: CPT

## 2017-04-26 PROCEDURE — 85025 COMPLETE CBC W/AUTO DIFF WBC: CPT

## 2017-04-26 PROCEDURE — 36415 COLL VENOUS BLD VENIPUNCTURE: CPT

## 2017-04-26 PROCEDURE — 84100 ASSAY OF PHOSPHORUS: CPT

## 2017-04-26 PROCEDURE — 84156 ASSAY OF PROTEIN URINE: CPT

## 2017-04-26 PROCEDURE — 83970 ASSAY OF PARATHORMONE: CPT

## 2017-04-26 PROCEDURE — 82306 VITAMIN D 25 HYDROXY: CPT

## 2017-04-27 LAB
MICROALBUMIN UG/MIN 24H UR-MRATE: NORMAL UG/MIN
MICROALBUMIN,MG/SPEC: NORMAL MG/SPEC
URINE COLLECTION DURATION: 3 HR
URINE MICROALBUMIN CONCENTRATION: 495 UG/ML
URINE VOLUME: NORMAL ML

## 2017-05-15 ENCOUNTER — LAB VISIT (OUTPATIENT)
Dept: LAB | Facility: HOSPITAL | Age: 57
End: 2017-05-15
Attending: INTERNAL MEDICINE
Payer: MEDICAID

## 2017-05-15 DIAGNOSIS — I10 ESSENTIAL HYPERTENSION, MALIGNANT: ICD-10-CM

## 2017-05-15 DIAGNOSIS — D63.1 ANEMIA IN CHRONIC KIDNEY DISEASE(285.21): Primary | ICD-10-CM

## 2017-05-15 DIAGNOSIS — N17.9 ACUTE KIDNEY FAILURE, UNSPECIFIED: ICD-10-CM

## 2017-05-15 DIAGNOSIS — E21.1 HYPERPARATHYROIDISM DUE TO 1,25(0H)2D3: ICD-10-CM

## 2017-05-15 DIAGNOSIS — E87.20 ACIDOSIS: ICD-10-CM

## 2017-05-15 DIAGNOSIS — N18.9 ANEMIA IN CHRONIC KIDNEY DISEASE(285.21): Primary | ICD-10-CM

## 2017-05-15 DIAGNOSIS — N18.4 CHRONIC KIDNEY DISEASE, STAGE IV (SEVERE): ICD-10-CM

## 2017-05-15 LAB
ALBUMIN SERPL BCP-MCNC: 3.7 G/DL
ANION GAP SERPL CALC-SCNC: 10 MMOL/L
BACTERIA #/AREA URNS HPF: NORMAL /HPF
BASOPHILS # BLD AUTO: 0 K/UL
BASOPHILS NFR BLD: 0.5 %
BILIRUB UR QL STRIP: NEGATIVE
BUN SERPL-MCNC: 41 MG/DL
CALCIUM SERPL-MCNC: 9.4 MG/DL
CHLORIDE SERPL-SCNC: 112 MMOL/L
CLARITY UR: CLEAR
CO2 SERPL-SCNC: 22 MMOL/L
COLOR UR: YELLOW
CREAT SERPL-MCNC: 2.3 MG/DL
CREAT UR-MCNC: 85.8 MG/DL
DIFFERENTIAL METHOD: ABNORMAL
EOSINOPHIL # BLD AUTO: 0.7 K/UL
EOSINOPHIL NFR BLD: 6.9 %
ERYTHROCYTE [DISTWIDTH] IN BLOOD BY AUTOMATED COUNT: 15.3 %
EST. GFR  (AFRICAN AMERICAN): 26 ML/MIN/1.73 M^2
EST. GFR  (NON AFRICAN AMERICAN): 23 ML/MIN/1.73 M^2
GLUCOSE SERPL-MCNC: 140 MG/DL
GLUCOSE UR QL STRIP: NEGATIVE
HCT VFR BLD AUTO: 32.4 %
HGB BLD-MCNC: 10.3 G/DL
HGB UR QL STRIP: ABNORMAL
HYALINE CASTS #/AREA URNS LPF: 0 /LPF
KETONES UR QL STRIP: NEGATIVE
LEUKOCYTE ESTERASE UR QL STRIP: ABNORMAL
LYMPHOCYTES # BLD AUTO: 2.5 K/UL
LYMPHOCYTES NFR BLD: 23.8 %
MCH RBC QN AUTO: 27.4 PG
MCHC RBC AUTO-ENTMCNC: 31.9 %
MCV RBC AUTO: 86 FL
MICROSCOPIC COMMENT: NORMAL
MONOCYTES # BLD AUTO: 0.5 K/UL
MONOCYTES NFR BLD: 5.2 %
NEUTROPHILS # BLD AUTO: 6.7 K/UL
NEUTROPHILS NFR BLD: 63.6 %
NITRITE UR QL STRIP: NEGATIVE
PH UR STRIP: 5 [PH] (ref 5–8)
PHOSPHATE SERPL-MCNC: 4.4 MG/DL
PLATELET # BLD AUTO: 236 K/UL
PMV BLD AUTO: 7.9 FL
POTASSIUM SERPL-SCNC: 5 MMOL/L
PROT UR QL STRIP: ABNORMAL
PROT UR-MCNC: 76 MG/DL
RBC # BLD AUTO: 3.77 M/UL
RBC #/AREA URNS HPF: 2 /HPF (ref 0–4)
SODIUM SERPL-SCNC: 144 MMOL/L
SP GR UR STRIP: 1.02 (ref 1–1.03)
SQUAMOUS #/AREA URNS HPF: 2 /HPF
URN SPEC COLLECT METH UR: ABNORMAL
UROBILINOGEN UR STRIP-ACNC: NEGATIVE EU/DL
WBC # BLD AUTO: 10.5 K/UL
WBC #/AREA URNS HPF: 1 /HPF (ref 0–5)

## 2017-05-15 PROCEDURE — 85025 COMPLETE CBC W/AUTO DIFF WBC: CPT

## 2017-05-15 PROCEDURE — 36415 COLL VENOUS BLD VENIPUNCTURE: CPT

## 2017-05-15 PROCEDURE — 81000 URINALYSIS NONAUTO W/SCOPE: CPT

## 2017-05-15 PROCEDURE — 84156 ASSAY OF PROTEIN URINE: CPT

## 2017-05-15 PROCEDURE — 82570 ASSAY OF URINE CREATININE: CPT

## 2017-05-15 PROCEDURE — 80069 RENAL FUNCTION PANEL: CPT

## 2017-05-21 ENCOUNTER — HOSPITAL ENCOUNTER (EMERGENCY)
Facility: HOSPITAL | Age: 57
Discharge: HOME | End: 2017-05-21
Attending: EMERGENCY MEDICINE
Payer: MEDICAID

## 2017-05-21 VITALS
DIASTOLIC BLOOD PRESSURE: 83 MMHG | HEIGHT: 67 IN | BODY MASS INDEX: 37.2 KG/M2 | HEART RATE: 72 BPM | RESPIRATION RATE: 18 BRPM | TEMPERATURE: 98 F | SYSTOLIC BLOOD PRESSURE: 180 MMHG | WEIGHT: 237 LBS | OXYGEN SATURATION: 98 %
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
## 2017-05-21 DIAGNOSIS — S40.022A ARM CONTUSION, LEFT, INITIAL ENCOUNTER: ICD-10-CM

## 2017-05-21 DIAGNOSIS — M25.522 LEFT ELBOW PAIN: Primary | ICD-10-CM

## 2017-05-21 PROCEDURE — 99283 EMERGENCY DEPT VISIT LOW MDM: CPT
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
## 2017-05-22 ENCOUNTER — LAB VISIT (OUTPATIENT)
Dept: LAB | Facility: HOSPITAL | Age: 57
End: 2017-05-22
Attending: INTERNAL MEDICINE
Payer: MEDICAID

## 2017-05-22 DIAGNOSIS — N18.4 CHRONIC KIDNEY DISEASE, STAGE IV (SEVERE): Primary | ICD-10-CM

## 2017-05-22 DIAGNOSIS — D63.1 ANEMIA IN CHRONIC KIDNEY DISEASE(285.21): ICD-10-CM

## 2017-05-22 DIAGNOSIS — N18.9 ANEMIA IN CHRONIC KIDNEY DISEASE(285.21): ICD-10-CM

## 2017-05-22 DIAGNOSIS — N17.9 ACUTE KIDNEY FAILURE, UNSPECIFIED: ICD-10-CM

## 2017-05-22 DIAGNOSIS — I10 ESSENTIAL HYPERTENSION, MALIGNANT: ICD-10-CM

## 2017-05-22 LAB
ALBUMIN SERPL BCP-MCNC: 3.5 G/DL
ANION GAP SERPL CALC-SCNC: 9 MMOL/L
BACTERIA #/AREA URNS HPF: NORMAL /HPF
BASOPHILS # BLD AUTO: 0.1 K/UL
BASOPHILS NFR BLD: 0.5 %
BILIRUB UR QL STRIP: NEGATIVE
BUN SERPL-MCNC: 44 MG/DL
CALCIUM SERPL-MCNC: 9.3 MG/DL
CHLORIDE SERPL-SCNC: 113 MMOL/L
CLARITY UR: CLEAR
CO2 SERPL-SCNC: 21 MMOL/L
COLOR UR: YELLOW
CREAT SERPL-MCNC: 2.3 MG/DL
CREAT UR-MCNC: 78.4 MG/DL
DIFFERENTIAL METHOD: ABNORMAL
EOSINOPHIL # BLD AUTO: 0.9 K/UL
EOSINOPHIL NFR BLD: 8.8 %
ERYTHROCYTE [DISTWIDTH] IN BLOOD BY AUTOMATED COUNT: 15.9 %
EST. GFR  (AFRICAN AMERICAN): 26 ML/MIN/1.73 M^2
EST. GFR  (NON AFRICAN AMERICAN): 23 ML/MIN/1.73 M^2
GLUCOSE SERPL-MCNC: 170 MG/DL
GLUCOSE UR QL STRIP: NEGATIVE
HCT VFR BLD AUTO: 30.8 %
HGB BLD-MCNC: 9.9 G/DL
HGB UR QL STRIP: ABNORMAL
HYALINE CASTS #/AREA URNS LPF: 0 /LPF
KETONES UR QL STRIP: NEGATIVE
LEUKOCYTE ESTERASE UR QL STRIP: ABNORMAL
LYMPHOCYTES # BLD AUTO: 2.3 K/UL
LYMPHOCYTES NFR BLD: 23.6 %
MCH RBC QN AUTO: 27.4 PG
MCHC RBC AUTO-ENTMCNC: 32.2 %
MCV RBC AUTO: 85 FL
MICROSCOPIC COMMENT: NORMAL
MONOCYTES # BLD AUTO: 0.7 K/UL
MONOCYTES NFR BLD: 6.6 %
NEUTROPHILS # BLD AUTO: 6 K/UL
NEUTROPHILS NFR BLD: 60.5 %
NITRITE UR QL STRIP: NEGATIVE
PH UR STRIP: 5 [PH] (ref 5–8)
PHOSPHATE SERPL-MCNC: 4.7 MG/DL
PLATELET # BLD AUTO: 203 K/UL
PMV BLD AUTO: 8.2 FL
POTASSIUM SERPL-SCNC: 5.4 MMOL/L
PROT UR QL STRIP: ABNORMAL
PROT UR-MCNC: 53 MG/DL
RBC # BLD AUTO: 3.62 M/UL
RBC #/AREA URNS HPF: 4 /HPF (ref 0–4)
SODIUM SERPL-SCNC: 143 MMOL/L
SP GR UR STRIP: 1.01 (ref 1–1.03)
SQUAMOUS #/AREA URNS HPF: 5 /HPF
URN SPEC COLLECT METH UR: ABNORMAL
UROBILINOGEN UR STRIP-ACNC: NEGATIVE EU/DL
WBC # BLD AUTO: 9.9 K/UL
WBC #/AREA URNS HPF: 1 /HPF (ref 0–5)

## 2017-05-22 PROCEDURE — 82570 ASSAY OF URINE CREATININE: CPT

## 2017-05-22 PROCEDURE — 84156 ASSAY OF PROTEIN URINE: CPT

## 2017-05-22 PROCEDURE — 36415 COLL VENOUS BLD VENIPUNCTURE: CPT

## 2017-05-22 PROCEDURE — 85025 COMPLETE CBC W/AUTO DIFF WBC: CPT

## 2017-05-22 PROCEDURE — 81000 URINALYSIS NONAUTO W/SCOPE: CPT

## 2017-05-22 PROCEDURE — 80069 RENAL FUNCTION PANEL: CPT
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
## 2017-05-22 NOTE — ED NOTES
Left arm placed in sling with teaching Given written and verbal DC instructions questions answered per MD aware to follow up with PCP encouraged to return if needed.

## 2017-05-22 NOTE — DISCHARGE INSTRUCTIONS
Tylenol and ibuprofen as needed for pain.  Rest arm and use ice to help.  See your primary care provider in one week.  Follow up with orthopedics if symptoms don't improve

## 2017-05-30 ENCOUNTER — LAB VISIT (OUTPATIENT)
Dept: LAB | Facility: HOSPITAL | Age: 57
End: 2017-05-30
Attending: INTERNAL MEDICINE
Payer: MEDICAID

## 2017-05-30 DIAGNOSIS — D63.1 ANEMIA IN CHRONIC KIDNEY DISEASE(285.21): Primary | ICD-10-CM

## 2017-05-30 DIAGNOSIS — N18.9 ANEMIA IN CHRONIC KIDNEY DISEASE(285.21): Primary | ICD-10-CM

## 2017-05-30 DIAGNOSIS — E87.20 ACIDOSIS: ICD-10-CM

## 2017-05-30 DIAGNOSIS — N17.9 ACUTE KIDNEY FAILURE, UNSPECIFIED: ICD-10-CM

## 2017-05-30 DIAGNOSIS — N18.4 CHRONIC KIDNEY DISEASE, STAGE IV (SEVERE): ICD-10-CM

## 2017-05-30 DIAGNOSIS — I10 ESSENTIAL HYPERTENSION, MALIGNANT: ICD-10-CM

## 2017-05-30 DIAGNOSIS — E21.1 HYPERPARATHYROIDISM DUE TO 1,25(0H)2D3: ICD-10-CM

## 2017-05-30 LAB
ALBUMIN SERPL BCP-MCNC: 3.5 G/DL
ANION GAP SERPL CALC-SCNC: 10 MMOL/L
BACTERIA #/AREA URNS HPF: ABNORMAL /HPF
BASOPHILS # BLD AUTO: 0 K/UL
BASOPHILS NFR BLD: 0.3 %
BILIRUB UR QL STRIP: NEGATIVE
BUN SERPL-MCNC: 37 MG/DL
CALCIUM SERPL-MCNC: 9.2 MG/DL
CHLORIDE SERPL-SCNC: 114 MMOL/L
CLARITY UR: CLEAR
CO2 SERPL-SCNC: 21 MMOL/L
COLOR UR: YELLOW
CREAT SERPL-MCNC: 2.2 MG/DL
CREAT UR-MCNC: 81.5 MG/DL
DIFFERENTIAL METHOD: ABNORMAL
EOSINOPHIL # BLD AUTO: 1.1 K/UL
EOSINOPHIL NFR BLD: 11.6 %
ERYTHROCYTE [DISTWIDTH] IN BLOOD BY AUTOMATED COUNT: 16.5 %
EST. GFR  (AFRICAN AMERICAN): 27 ML/MIN/1.73 M^2
EST. GFR  (NON AFRICAN AMERICAN): 24 ML/MIN/1.73 M^2
GLUCOSE SERPL-MCNC: 133 MG/DL
GLUCOSE UR QL STRIP: NEGATIVE
HCT VFR BLD AUTO: 31.8 %
HGB BLD-MCNC: 10.3 G/DL
HGB UR QL STRIP: ABNORMAL
HYALINE CASTS #/AREA URNS LPF: 0 /LPF
KETONES UR QL STRIP: NEGATIVE
LEUKOCYTE ESTERASE UR QL STRIP: ABNORMAL
LYMPHOCYTES # BLD AUTO: 2.2 K/UL
LYMPHOCYTES NFR BLD: 23.1 %
MCH RBC QN AUTO: 27.5 PG
MCHC RBC AUTO-ENTMCNC: 32.3 %
MCV RBC AUTO: 85 FL
MICROSCOPIC COMMENT: ABNORMAL
MONOCYTES # BLD AUTO: 0.5 K/UL
MONOCYTES NFR BLD: 5.9 %
NEUTROPHILS # BLD AUTO: 5.5 K/UL
NEUTROPHILS NFR BLD: 59.1 %
NITRITE UR QL STRIP: NEGATIVE
PH UR STRIP: 5 [PH] (ref 5–8)
PHOSPHATE SERPL-MCNC: 4.9 MG/DL
PLATELET # BLD AUTO: 193 K/UL
PMV BLD AUTO: 8.2 FL
POTASSIUM SERPL-SCNC: 5.4 MMOL/L
PROT UR QL STRIP: ABNORMAL
PROT UR-MCNC: 94 MG/DL
PROT/CREAT RATIO, UR: 1.15
RBC # BLD AUTO: 3.74 M/UL
RBC #/AREA URNS HPF: 2 /HPF (ref 0–4)
SODIUM SERPL-SCNC: 145 MMOL/L
SP GR UR STRIP: 1.02 (ref 1–1.03)
SQUAMOUS #/AREA URNS HPF: 8 /HPF
URN SPEC COLLECT METH UR: ABNORMAL
UROBILINOGEN UR STRIP-ACNC: NEGATIVE EU/DL
WBC # BLD AUTO: 9.3 K/UL
WBC #/AREA URNS HPF: 2 /HPF (ref 0–5)

## 2017-05-30 PROCEDURE — 81000 URINALYSIS NONAUTO W/SCOPE: CPT

## 2017-05-30 PROCEDURE — 84156 ASSAY OF PROTEIN URINE: CPT

## 2017-05-30 PROCEDURE — 80069 RENAL FUNCTION PANEL: CPT

## 2017-05-30 PROCEDURE — 36415 COLL VENOUS BLD VENIPUNCTURE: CPT

## 2017-05-30 PROCEDURE — 85025 COMPLETE CBC W/AUTO DIFF WBC: CPT

## 2017-06-02 ENCOUNTER — LAB VISIT (OUTPATIENT)
Dept: LAB | Facility: HOSPITAL | Age: 57
End: 2017-06-02
Attending: INTERNAL MEDICINE
Payer: MEDICAID

## 2017-06-02 DIAGNOSIS — I10 ESSENTIAL HYPERTENSION, MALIGNANT: ICD-10-CM

## 2017-06-02 DIAGNOSIS — E87.20 ACIDOSIS: ICD-10-CM

## 2017-06-02 DIAGNOSIS — D63.1 ANEMIA IN CHRONIC KIDNEY DISEASE(285.21): Primary | ICD-10-CM

## 2017-06-02 DIAGNOSIS — N18.9 ANEMIA IN CHRONIC KIDNEY DISEASE(285.21): Primary | ICD-10-CM

## 2017-06-02 LAB
ALBUMIN SERPL BCP-MCNC: 3.5 G/DL
ANION GAP SERPL CALC-SCNC: 8 MMOL/L
BACTERIA #/AREA URNS HPF: NORMAL /HPF
BASOPHILS # BLD AUTO: 0 K/UL
BASOPHILS NFR BLD: 0.4 %
BILIRUB UR QL STRIP: NEGATIVE
BUN SERPL-MCNC: 41 MG/DL
CALCIUM SERPL-MCNC: 9.2 MG/DL
CHLORIDE SERPL-SCNC: 115 MMOL/L
CLARITY UR: CLEAR
CO2 SERPL-SCNC: 20 MMOL/L
COLOR UR: YELLOW
CREAT SERPL-MCNC: 2.4 MG/DL
CREAT UR-MCNC: 75.6 MG/DL
DIFFERENTIAL METHOD: ABNORMAL
EOSINOPHIL # BLD AUTO: 1 K/UL
EOSINOPHIL NFR BLD: 10.9 %
ERYTHROCYTE [DISTWIDTH] IN BLOOD BY AUTOMATED COUNT: 16.3 %
EST. GFR  (AFRICAN AMERICAN): 25 ML/MIN/1.73 M^2
EST. GFR  (NON AFRICAN AMERICAN): 21 ML/MIN/1.73 M^2
GLUCOSE SERPL-MCNC: 138 MG/DL
GLUCOSE UR QL STRIP: NEGATIVE
HCT VFR BLD AUTO: 31.1 %
HGB BLD-MCNC: 9.9 G/DL
HGB UR QL STRIP: ABNORMAL
HYALINE CASTS #/AREA URNS LPF: 0 /LPF
KETONES UR QL STRIP: NEGATIVE
LEUKOCYTE ESTERASE UR QL STRIP: NEGATIVE
LYMPHOCYTES # BLD AUTO: 2.3 K/UL
LYMPHOCYTES NFR BLD: 24.7 %
MCH RBC QN AUTO: 27.3 PG
MCHC RBC AUTO-ENTMCNC: 32 %
MCV RBC AUTO: 86 FL
MICROSCOPIC COMMENT: NORMAL
MONOCYTES # BLD AUTO: 0.6 K/UL
MONOCYTES NFR BLD: 6.2 %
NEUTROPHILS # BLD AUTO: 5.3 K/UL
NEUTROPHILS NFR BLD: 57.8 %
NITRITE UR QL STRIP: NEGATIVE
PH UR STRIP: 5 [PH] (ref 5–8)
PHOSPHATE SERPL-MCNC: 4.8 MG/DL
PLATELET # BLD AUTO: 214 K/UL
PMV BLD AUTO: 8.2 FL
POTASSIUM SERPL-SCNC: 5.1 MMOL/L
PROT UR QL STRIP: ABNORMAL
PROT UR-MCNC: 98 MG/DL
PROT/CREAT RATIO, UR: 1.3
RBC # BLD AUTO: 3.64 M/UL
RBC #/AREA URNS HPF: 3 /HPF (ref 0–4)
SODIUM SERPL-SCNC: 143 MMOL/L
SP GR UR STRIP: 1.02 (ref 1–1.03)
SQUAMOUS #/AREA URNS HPF: 5 /HPF
URN SPEC COLLECT METH UR: ABNORMAL
UROBILINOGEN UR STRIP-ACNC: NEGATIVE EU/DL
WBC # BLD AUTO: 9.3 K/UL
WBC #/AREA URNS HPF: 2 /HPF (ref 0–5)

## 2017-06-02 PROCEDURE — 84156 ASSAY OF PROTEIN URINE: CPT

## 2017-06-02 PROCEDURE — 36415 COLL VENOUS BLD VENIPUNCTURE: CPT

## 2017-06-02 PROCEDURE — 80069 RENAL FUNCTION PANEL: CPT

## 2017-06-02 PROCEDURE — 81000 URINALYSIS NONAUTO W/SCOPE: CPT

## 2017-06-02 PROCEDURE — 85025 COMPLETE CBC W/AUTO DIFF WBC: CPT

## 2017-06-15 ENCOUNTER — TELEPHONE (OUTPATIENT)
Dept: TRANSPLANT | Facility: CLINIC | Age: 57
End: 2017-06-15

## 2017-06-26 DIAGNOSIS — Z76.82 ORGAN TRANSPLANT CANDIDATE: Primary | ICD-10-CM

## 2017-07-12 ENCOUNTER — HOSPITAL ENCOUNTER (OUTPATIENT)
Dept: RADIOLOGY | Facility: HOSPITAL | Age: 57
Discharge: HOME | End: 2017-07-12
Attending: TRANSPLANT SURGERY
Payer: MEDICAID

## 2017-07-12 ENCOUNTER — LAB VISIT (OUTPATIENT)
Dept: LAB | Facility: HOSPITAL | Age: 57
End: 2017-07-12
Payer: COMMERCIAL

## 2017-07-12 ENCOUNTER — CLINICAL SUPPORT (OUTPATIENT)
Dept: INFECTIOUS DISEASES | Facility: CLINIC | Age: 57
End: 2017-07-12
Payer: MEDICAID

## 2017-07-12 ENCOUNTER — OFFICE VISIT (OUTPATIENT)
Dept: TRANSPLANT | Facility: CLINIC | Age: 57
End: 2017-07-12
Payer: COMMERCIAL

## 2017-07-12 VITALS
RESPIRATION RATE: 16 BRPM | BODY MASS INDEX: 37.67 KG/M2 | HEIGHT: 66 IN | OXYGEN SATURATION: 98 % | WEIGHT: 234.38 LBS | HEART RATE: 78 BPM | SYSTOLIC BLOOD PRESSURE: 161 MMHG | TEMPERATURE: 98 F | DIASTOLIC BLOOD PRESSURE: 92 MMHG

## 2017-07-12 DIAGNOSIS — M19.90 ARTHRITIS: Chronic | ICD-10-CM

## 2017-07-12 DIAGNOSIS — E11.65 UNCONTROLLED TYPE 2 DIABETES MELLITUS WITH HYPERGLYCEMIA, UNSPECIFIED LONG TERM INSULIN USE STATUS: ICD-10-CM

## 2017-07-12 DIAGNOSIS — Z76.82 ORGAN TRANSPLANT CANDIDATE: ICD-10-CM

## 2017-07-12 DIAGNOSIS — R80.9 PROTEINURIA, UNSPECIFIED TYPE: Chronic | ICD-10-CM

## 2017-07-12 DIAGNOSIS — R53.83 FATIGUE, UNSPECIFIED TYPE: ICD-10-CM

## 2017-07-12 DIAGNOSIS — E78.5 HYPERLIPIDEMIA, UNSPECIFIED HYPERLIPIDEMIA TYPE: Chronic | ICD-10-CM

## 2017-07-12 DIAGNOSIS — Z01.818 PRE-TRANSPLANT EVALUATION FOR CHRONIC KIDNEY DISEASE: Primary | ICD-10-CM

## 2017-07-12 DIAGNOSIS — M15.9 PRIMARY OSTEOARTHRITIS INVOLVING MULTIPLE JOINTS: Chronic | ICD-10-CM

## 2017-07-12 DIAGNOSIS — N18.4 CKD (CHRONIC KIDNEY DISEASE) STAGE 4, GFR 15-29 ML/MIN: Chronic | ICD-10-CM

## 2017-07-12 DIAGNOSIS — E11.65 UNCONTROLLED TYPE 2 DIABETES MELLITUS WITH DIABETIC NEPHROPATHY, UNSPECIFIED LONG TERM INSULIN USE STATUS: ICD-10-CM

## 2017-07-12 DIAGNOSIS — E03.9 HYPOTHYROIDISM, UNSPECIFIED TYPE: ICD-10-CM

## 2017-07-12 DIAGNOSIS — Z22.322 MRSA (METHICILLIN RESISTANT STAPHYLOCOCCUS AUREUS) CARRIER: ICD-10-CM

## 2017-07-12 DIAGNOSIS — F32.89 OTHER DEPRESSION: ICD-10-CM

## 2017-07-12 DIAGNOSIS — E11.21 UNCONTROLLED TYPE 2 DIABETES MELLITUS WITH DIABETIC NEPHROPATHY, UNSPECIFIED LONG TERM INSULIN USE STATUS: ICD-10-CM

## 2017-07-12 DIAGNOSIS — D63.1 ANEMIA IN STAGE 4 CHRONIC KIDNEY DISEASE: Chronic | ICD-10-CM

## 2017-07-12 DIAGNOSIS — N18.4 ANEMIA IN STAGE 4 CHRONIC KIDNEY DISEASE: Chronic | ICD-10-CM

## 2017-07-12 DIAGNOSIS — I10 ESSENTIAL HYPERTENSION: ICD-10-CM

## 2017-07-12 PROBLEM — N17.9 AKI (ACUTE KIDNEY INJURY): Status: RESOLVED | Noted: 2017-03-27 | Resolved: 2017-07-12

## 2017-07-12 LAB
ALBUMIN SERPL BCP-MCNC: 3.6 G/DL
ALP SERPL-CCNC: 310 U/L
ALT SERPL W/O P-5'-P-CCNC: 22 U/L
ANION GAP SERPL CALC-SCNC: 11 MMOL/L
APTT BLDCRRT: 23.4 SEC
AST SERPL-CCNC: 17 U/L
BASOPHILS # BLD AUTO: 0.06 K/UL
BASOPHILS NFR BLD: 0.6 %
BILIRUB DIRECT SERPL-MCNC: 0.1 MG/DL
BILIRUB SERPL-MCNC: 0.2 MG/DL
BUN SERPL-MCNC: 61 MG/DL
CALCIUM SERPL-MCNC: 9.2 MG/DL
CHLORIDE SERPL-SCNC: 110 MMOL/L
CHOLEST/HDLC SERPL: 5.5 {RATIO}
CO2 SERPL-SCNC: 20 MMOL/L
CREAT SERPL-MCNC: 2.8 MG/DL
DIFFERENTIAL METHOD: ABNORMAL
EOSINOPHIL # BLD AUTO: 1.2 K/UL
EOSINOPHIL NFR BLD: 12.3 %
ERYTHROCYTE [DISTWIDTH] IN BLOOD BY AUTOMATED COUNT: 15.3 %
EST. GFR  (AFRICAN AMERICAN): 20.5 ML/MIN/1.73 M^2
EST. GFR  (NON AFRICAN AMERICAN): 17.8 ML/MIN/1.73 M^2
ESTIMATED AVG GLUCOSE: 177 MG/DL
GLUCOSE SERPL-MCNC: 252 MG/DL
HBA1C MFR BLD HPLC: 7.8 %
HBV CORE AB SERPL QL IA: NEGATIVE
HBV SURFACE AG SERPL QL IA: NEGATIVE
HCT VFR BLD AUTO: 34.4 %
HCV AB SERPL QL IA: NEGATIVE
HDL/CHOLESTEROL RATIO: 18.2 %
HDLC SERPL-MCNC: 137 MG/DL
HDLC SERPL-MCNC: 25 MG/DL
HGB BLD-MCNC: 11.1 G/DL
HIV 1+2 AB+HIV1 P24 AG SERPL QL IA: NEGATIVE
INR PPP: 1
LDLC SERPL CALC-MCNC: 61.4 MG/DL
LYMPHOCYTES # BLD AUTO: 2.3 K/UL
LYMPHOCYTES NFR BLD: 23.9 %
MCH RBC QN AUTO: 28.2 PG
MCHC RBC AUTO-ENTMCNC: 32.3 %
MCV RBC AUTO: 87 FL
MONOCYTES # BLD AUTO: 0.8 K/UL
MONOCYTES NFR BLD: 7.9 %
NEUTROPHILS # BLD AUTO: 5.3 K/UL
NEUTROPHILS NFR BLD: 54.9 %
NONHDLC SERPL-MCNC: 112 MG/DL
PHOSPHATE SERPL-MCNC: 4.2 MG/DL
PLATELET # BLD AUTO: 234 K/UL
PMV BLD AUTO: 10.1 FL
POTASSIUM SERPL-SCNC: 4.5 MMOL/L
PROT SERPL-MCNC: 7.7 G/DL
PROTHROMBIN TIME: 10.5 SEC
PTH-INTACT SERPL-MCNC: 181 PG/ML
RBC # BLD AUTO: 3.94 M/UL
RPR SER QL: NORMAL
SODIUM SERPL-SCNC: 141 MMOL/L
TRIGL SERPL-MCNC: 253 MG/DL
WBC # BLD AUTO: 9.66 K/UL

## 2017-07-12 PROCEDURE — 4010F ACE/ARB THERAPY RXD/TAKEN: CPT | Mod: S$GLB,TXP,, | Performed by: NURSE PRACTITIONER

## 2017-07-12 PROCEDURE — 81376 HLA II TYPING 1 LOCUS LR: CPT | Mod: 91,PO,TXP

## 2017-07-12 PROCEDURE — 99999 PR PBB SHADOW E&M-EST. PATIENT-LVL IV: CPT | Mod: PBBFAC,TXP,, | Performed by: NURSE PRACTITIONER

## 2017-07-12 PROCEDURE — 90636 HEP A/HEP B VACC ADULT IM: CPT | Mod: PBBFAC,TXP

## 2017-07-12 PROCEDURE — 97802 MEDICAL NUTRITION INDIV IN: CPT | Mod: S$GLB,TXP,, | Performed by: DIETITIAN, REGISTERED

## 2017-07-12 PROCEDURE — 3045F PR MOST RECENT HEMOGLOBIN A1C LEVEL 7.0-9.0%: CPT | Mod: S$GLB,TXP,, | Performed by: NURSE PRACTITIONER

## 2017-07-12 PROCEDURE — 86833 HLA CLASS II HIGH DEFIN QUAL: CPT | Mod: PO,TXP

## 2017-07-12 PROCEDURE — 86825 HLA X-MATH NON-CYTOTOXIC: CPT | Mod: 91,PO,TXP

## 2017-07-12 PROCEDURE — 99211 OFF/OP EST MAY X REQ PHY/QHP: CPT | Mod: PBBFAC,25,TXP

## 2017-07-12 PROCEDURE — 81373 HLA I TYPING 1 LOCUS LR: CPT | Mod: 91,PO,TXP

## 2017-07-12 PROCEDURE — 86828 HLA CLASS I&II ANTIBODY QUAL: CPT | Mod: PO,TXP

## 2017-07-12 PROCEDURE — 86825 HLA X-MATH NON-CYTOTOXIC: CPT | Mod: PO,TXP

## 2017-07-12 PROCEDURE — 99999 PR PBB SHADOW E&M-EST. PATIENT-LVL I: CPT | Mod: PBBFAC,TXP,,

## 2017-07-12 PROCEDURE — 99205 OFFICE O/P NEW HI 60 MIN: CPT | Mod: S$GLB,TXP,, | Performed by: NURSE PRACTITIONER

## 2017-07-12 PROCEDURE — 86832 HLA CLASS I HIGH DEFIN QUAL: CPT | Mod: PO,TXP

## 2017-07-12 PROCEDURE — 72170 X-RAY EXAM OF PELVIS: CPT | Mod: 26,TXP,, | Performed by: RADIOLOGY

## 2017-07-12 PROCEDURE — 81373 HLA I TYPING 1 LOCUS LR: CPT | Mod: PO,TXP

## 2017-07-12 PROCEDURE — 99204 OFFICE O/P NEW MOD 45 MIN: CPT | Mod: S$GLB,TXP,, | Performed by: PHYSICIAN ASSISTANT

## 2017-07-12 PROCEDURE — 86829 HLA CLASS I/II ANTIBODY QUAL: CPT | Mod: 91,PO,TXP

## 2017-07-12 PROCEDURE — 90472 IMMUNIZATION ADMIN EACH ADD: CPT | Mod: PBBFAC,TXP

## 2017-07-12 PROCEDURE — 81376 HLA II TYPING 1 LOCUS LR: CPT | Mod: PO,TXP

## 2017-07-12 RX ORDER — LORAZEPAM 1 MG/1
1 TABLET ORAL NIGHTLY
COMMUNITY
End: 2017-08-22

## 2017-07-12 RX ORDER — FUROSEMIDE 40 MG/1
40 TABLET ORAL DAILY
COMMUNITY
End: 2018-04-12

## 2017-07-12 RX ORDER — INSULIN GLARGINE 100 [IU]/ML
90 INJECTION, SOLUTION SUBCUTANEOUS 2 TIMES DAILY
COMMUNITY
End: 2018-07-02 | Stop reason: SDUPTHER

## 2017-07-12 RX ORDER — LEVOTHYROXINE SODIUM 75 UG/1
75 TABLET ORAL DAILY
COMMUNITY
End: 2018-07-02 | Stop reason: SDUPTHER

## 2017-07-12 RX ORDER — FAMOTIDINE 20 MG/1
20 TABLET, FILM COATED ORAL DAILY
COMMUNITY
End: 2018-07-02 | Stop reason: SDUPTHER

## 2017-07-12 RX ORDER — ERGOCALCIFEROL 1.25 MG/1
50000 CAPSULE ORAL
COMMUNITY
End: 2017-08-22

## 2017-07-12 NOTE — PROGRESS NOTES
Transplant Nephrology  Kidney Transplant Recipient Evaluation    Referring Physician: Dalton Heaton  Current Nephrologist: aDlton Heaton    Subjective:   CC:  Initial evaluation of kidney transplant candidacy.    HPI:  Ms. Newell is a 57 y.o. year old White female who has presented to be evaluated as a potential kidney transplant recipient.  She has advanced kidney disease secondary to diabetic nephropathy and HTN.  Patient is currently pre-dialysis. She has   no access at this time  for dialysis access.     Previous Transplant: no    Past Medical and Surgical History: Ms. Newell  has a past medical history of Anemia; Arthritis; Asthma; Depression; Diabetes mellitus; Eosinophilia; Gout; Gout; Hyperlipidemia; Hypertension; Low back pain; MRSA carrier; Obesity; Renal disorder; Rotator cuff syndrome--left; Thyroid disease; and Ulnar neuropathy of right upper extremity.  She has a past surgical history that includes Back surgery; Shoulder arthroscopy; Cholecystectomy; Knee surgery; Joint replacement; Hemorrhoid surgery; Hip surgery; Achilles tendon surgery (Left, May 2015); and Shoulder surgery.    Past Social and Family History: Ms. Newell reports that she has never smoked. She has never used smokeless tobacco. She reports that she does not drink alcohol or use drugs. Her family history includes Alzheimer's disease in her mother; Diabetes in her mother and sister; Heart disease in her brother and father; Hyperlipidemia in her mother; Lupus in her sister; Stroke in her father; Thyroid disease in her mother.      Diabetes type 2  diagnosed around 2005. Initially was on oral meds.   Is currently on Insulin for the past 4-5 years.       Kidney bx 2/28/2017 which was considered insufficient for diagnosis d/t lack of cortical tissue.   It was noted to have clusters of eosinophils. Repeat bx recommended.        MRSA carrier--2015, Achilles tendon tear--repaired with a cast on. When the cast was removed  it was noted to have MRSA in the wound from surgery. Reports having a PICC line for 6 weeks , getting antibiotics.       Arthritis of multiple joints  No treatment at this time.    Fx Assessment  Has a high fall risk d/t having 3 surgeries/ knee  Replacements, and currently needs left hip replacement and needs rotator cuff surgery on the left side.   Uses a scooter for long distances. Does not do a lot of walking d/t SOB. She will need to sit down and rest for a few minutes.   She does not appear frail. Denies having any cardiac HX aside from HTN. She transfers to the exam stable without problems and assistance and did not appear SOB.       Reports having multiple possible donors    Past Medical History:   Diagnosis Date    Anemia     Arthritis     Asthma     allergic airway    Depression     Diabetes mellitus     Eosinophilia     Gout     Gout     Hyperlipidemia     Hypertension     Low back pain     MRSA carrier     Obesity     Renal disorder     Rotator cuff syndrome--left     Thyroid disease     Ulnar neuropathy of right upper extremity        Review of Systems   Constitutional: Positive for fatigue and unexpected weight change. Negative for activity change, appetite change, chills and fever.   HENT: Negative for congestion, facial swelling, postnasal drip, rhinorrhea, sinus pressure, sore throat and trouble swallowing.    Eyes: Positive for visual disturbance. Negative for pain and redness.        Wears glasses   Respiratory: Positive for shortness of breath. Negative for cough, chest tightness and wheezing.         SOB with minimal exertion    Cardiovascular: Negative.  Negative for chest pain, palpitations and leg swelling.   Gastrointestinal: Negative for abdominal pain, diarrhea, nausea and vomiting.   Genitourinary: Positive for decreased urine volume. Negative for dysuria, flank pain and urgency.   Musculoskeletal: Positive for arthralgias, back pain and gait problem. Negative for neck  "pain and neck stiffness.        Achilles tendon to left is unattached   Arthralgias, DJD to knees and hips, back   Skin: Negative for rash.        MRSA carrier   Allergic/Immunologic: Negative for environmental allergies, food allergies and immunocompromised state.   Neurological: Negative for dizziness, weakness, light-headedness and headaches.   Psychiatric/Behavioral: Negative for agitation and confusion. The patient is nervous/anxious.         Depression       Objective:   Blood pressure (!) 161/92, pulse 78, temperature 98.3 °F (36.8 °C), temperature source Oral, resp. rate 16, height 5' 5.83" (1.672 m), weight 106.3 kg (234 lb 5.6 oz), last menstrual period 02/28/2012, SpO2 98 %.body mass index is 38.02 kg/m².    Physical Exam   Constitutional: She is oriented to person, place, and time. She appears well-developed and well-nourished.   HENT:   Head: Normocephalic.   Mouth/Throat: Oropharynx is clear and moist. No oropharyngeal exudate.   Eyes: Conjunctivae and EOM are normal. Pupils are equal, round, and reactive to light. No scleral icterus.   Neck: Normal range of motion. Neck supple.   Cardiovascular: Normal rate, regular rhythm and normal heart sounds.    Pulmonary/Chest: Effort normal and breath sounds normal.   Abdominal: Soft. Normal appearance and bowel sounds are normal. She exhibits no distension and no mass. There is no splenomegaly or hepatomegaly. There is no tenderness. There is no rebound, no guarding, no CVA tenderness, no tenderness at McBurney's point and negative Chase's sign.   Obese abdomen    Musculoskeletal: Normal range of motion. She exhibits no edema.   Lymphadenopathy:     She has no cervical adenopathy.   Neurological: She is alert and oriented to person, place, and time. She exhibits normal muscle tone. Coordination normal.   Has a limp when walking   Skin: Skin is warm and dry.   Psychiatric: She has a normal mood and affect. Her behavior is normal.   Vitals " reviewed.      Labs:  Lab Results   Component Value Date    WBC 9.66 07/12/2017    HGB 11.1 (L) 07/12/2017    HCT 34.4 (L) 07/12/2017     07/12/2017    K 4.5 07/12/2017     07/12/2017    CO2 20 (L) 07/12/2017    BUN 61 (H) 07/12/2017    CREATININE 2.8 (H) 07/12/2017    EGFRNONAA 17.8 (A) 07/12/2017    CALCIUM 9.2 07/12/2017    PHOS 4.2 07/12/2017    MG 2.2 04/14/2017    ALBUMIN 3.6 07/12/2017    AST 17 07/12/2017    ALT 22 07/12/2017    UTPCR 1.30 (H) 06/02/2017    .0 (H) 07/12/2017       Lab Results   Component Value Date    BILIRUBINUA Negative 06/02/2017    PROTEINUA 2+ (A) 06/02/2017    NITRITE Negative 06/02/2017    RBCUA 3 06/02/2017    WBCUA 2 06/02/2017       No results found for: HLAABCTYPE    Labs were reviewed with the patient.    Assessment:     1. Uncontrolled type 2 diabetes mellitus with diabetic nephropathy, unspecified long term insulin use status    2. Pre-transplant evaluation for chronic kidney disease    3. Organ transplant candidate    4. CKD (chronic kidney disease) stage 4, GFR 15-29 ml/min    5. Essential hypertension    6. Fatigue, unspecified type    7. Other depression    8. Hypothyroidism, unspecified type    9. MRSA (methicillin resistant Staphylococcus aureus) carrier    10. Uncontrolled type 2 diabetes mellitus with hyperglycemia, unspecified long term insulin use status    11. Arthritis    12. Proteinuria, unspecified type    13. Anemia in stage 4 chronic kidney disease    14. Hyperlipidemia, unspecified hyperlipidemia type    15. Primary osteoarthritis involving multiple joints        Plan:   Body mass index is 38.02 kg/m².--> needs to maintain acceptable BMI for TXP, < 40  Encourage lifestyle modifications: diet, exercise, weight loss, low sodium diet/ 2 gms/day or less  I also recommended PT/ rehabilitation program to help with lower extremity weaknesses , and fall potential r/t arthralgias and DJD    Cardiology referral --- evaluation , DM, HTN, SOB with  exertion    thyroid panel, A1C      Kidney allocation scheme was also discussed with the patient.  Patient was advised that the average wait time for a kidney in [place] is 3-5 years     Kidney donor profile index (KPDI) and estimated post-transplant survival scores (EPTS) were reviewed. The benefits and risks of accepting a kidney with KDPI > 85% were explained to the patient. Patient verbalized understanding and consented to accept a kidney with KDPI > 85%       The side effects of immunosuppressants were reviewed that include but are not limited to the risk of infection, the risks of cancer (skin and lymphoma being most common), the risk of diabetes associated with prednisone use, acceleration of heart disease and diarrhea( this being more common post transplant).     Reviewed the hospital stay.  Reviewed the post transplant follow up.  Reviewed the importance of maintaining a good weight.  Reviewed the importance of controlling BP.  Reviewed the importance of following the renal diet.      Transplant Candidacy:   Based on available information, Ms. Newell is a high-risk kidney transplant candidate d/t HX DM, long term insulin, BMI 38.02 and limited mobility d/t DJD.  Final determination of transplant candidacy will be made once workup is complete and reviewed by the selection committee.    Melissa Cramer, FIDENCIO       UNOS Patient Status  Functional Status: 60% - Requires occasional assistance but is able to care for needs  Physical Capacity: Limited Mobility

## 2017-07-12 NOTE — PROGRESS NOTES
TRANSPLANT NUTRITIONAL ASSESSMENT    Referring Provider: Melissa Cramer NP    Reason for Visit: Pre-kidney transplant work-up (pre-dialysis)    Age: 57 y.o.  Sex: female    Patient Active Problem List   Diagnosis    Hip pain    Osteitis pubis    Knee pain    Hip bursitis, left    Asthma    Diabetes mellitus type 2, uncontrolled    Left hip pain    Left knee pain    Hip arthritis    Depression    Pain in joint involving left pelvic region and thigh    MRSA (methicillin resistant Staphylococcus aureus) carrier    Pain in joint involving left lower leg    Left shoulder pain    Rotator cuff syndrome of left shoulder    Biceps tendinitis on left    Rotator cuff syndrome    Neck pain, acute    Ulnar neuropathy of right upper extremity    Chest pain    Interstitial nephritis    Bilateral low back pain without sciatica    Hypothyroidism    Essential hypertension    Fatigue    Uncontrolled type 2 diabetes mellitus with hyperglycemia    Hyperglycemia    Hyperkalemia    Pre-transplant evaluation for chronic kidney disease    Organ transplant candidate    CKD (chronic kidney disease) stage 4, GFR 15-29 ml/min    Arthritis    Proteinuria    Anemia in stage 4 chronic kidney disease    HLD (hyperlipidemia)    DJD (degenerative joint disease)     Past Medical History:   Diagnosis Date    Anemia     Arthritis     Asthma     allergic airway    Depression     Diabetes mellitus     Eosinophilia     Gout     Gout     Hyperlipidemia     Hypertension     Low back pain     MRSA carrier     Obesity     Renal disorder     Rotator cuff syndrome--left     Thyroid disease     Ulnar neuropathy of right upper extremity      Lab Results   Component Value Date     (H) 07/12/2017    K 4.5 07/12/2017    PHOS 4.2 07/12/2017    MG 2.2 04/14/2017    CHOL 137 07/12/2017    HDL 25 (L) 07/12/2017    TRIG 253 (H) 07/12/2017    ALBUMIN 3.6 07/12/2017    HGBA1C 7.8 (H) 07/12/2017    CALCIUM 9.2  "07/12/2017       Current Outpatient Prescriptions   Medication Sig    atorvastatin (LIPITOR) 20 MG tablet Take 20 mg by mouth once daily.    ergocalciferol (ERGOCALCIFEROL) 50,000 unit Cap Take 50,000 Units by mouth every 7 days.    famotidine (PEPCID) 20 MG tablet Take 20 mg by mouth once daily at 6am.    furosemide (LASIX) 40 MG tablet Take 40 mg by mouth once daily.    glimepiride (AMARYL) 2 MG tablet Take 1 tablet (2 mg total) by mouth daily with breakfast.    insulin glargine (BASAGLAR KWIKPEN) 100 unit/mL (3 mL) InPn pen Inject into the skin. 80 units SQ QAM, 70 units SQ QPM    irbesartan (AVAPRO) 150 MG tablet Take 150 mg by mouth every evening.    levothyroxine (SYNTHROID) 75 MCG tablet Take 75 mcg by mouth once daily.    loratadine (CLARITIN) 10 mg tablet Take 10 mg by mouth once daily.    lorazepam (ATIVAN) 1 MG tablet Take 1 mg by mouth every evening.    sodium bicarbonate 650 MG tablet Take 650 mg by mouth 3 (three) times daily.     vilazodone (VIIBRYD) 40 mg Tab tablet Take 40 mg by mouth once daily.     No current facility-administered medications for this visit.      Allergies: Codeine    Ht Readings from Last 1 Encounters:   07/12/17 5' 5.83" (1.672 m)     Wt Readings from Last 1 Encounters:   07/12/17 106.3 kg (234 lb 5.6 oz)      BMI: Body mass index is 38.02 kg/m².    Usual Weight: was 225lbs, reports wt gain up to 237lbs  Weight Change/Time: reports believe 3lb wt loss may be due to recently starting on fluid pills  Current Diet: diabetic, low K, low Na diet  Appetite/Current Intake: good   Exercise/Physical Activity: unable to exercise due to 3 previous knee surgeries, needs hip replacement, and has a torn rotator cuff per pt  Nutritional/Herbal Supplements: vitamin D  Chewing/Swallowing Problems: none reported  Symptoms: nausea-occurs at times in the mornings    Estimated Kcal Need: 1710kcals/day (30kcals/kg IBW)  Estimated Protein Need: 84gms protein/day (0.8gms/kg " BW)    Nutritional History: Pt here today with her partner, Any.  Any prepares meals and grocery shops.  Limits salt in cooking, pt also limits intake of high K fruits and vegetables.  Dining out/fast food: seldom, states she has recently been eating out more since grandson has been in town the last few weeks.  Pt limits intake of high Na/processed foods but does occasionally consume deli meats, canned vegetables, and pretzels.  Pt typically consumes 2-3 meals per day-occasionally may skip breakfast, snacks on pretzels, cheese stick, watermelon, or cantaloupe between meals.  Beverages include water and unsweet tea.  SMBG: every 3 days, reports gluc averages 140.    Educational Need? yes  Barriers: none identified  Discussed with: patient and significant other  Interventions: Patient taught nutrition information regarding   -Diabetes and CKD Nutrition Therapy reviewed ( high/low food sources of K, Phos and protein, low sodium and fluid intake, emphasis on moderate protein intake, basic DM guidelines, carb counting, 2-day sample meal plan)   -Encouraged pt to limit dining out and discussed healthier options to consume at restaurants.  Goals/Recommendations: diet adherence, gradual weight loss and choose healthy options when dining out  Actions Taken: instruct/provide written information  Patient and/or family comprehend instructions: yes  Outcome: Verbalizes understanding  Monitoring: contact information provided, will follow-up as needed      Counseling Time: 15 minutes

## 2017-07-12 NOTE — PROGRESS NOTES
INITIAL PATIENT EDUCATION NOTE    Ms. Andra Newell was seen in pre-kidney transplant clinic for evaluation for kidney, kidney/pancreas or pancreas only transplant.  The patient attended a group education session that discussed/reviewed the following aspects of transplantation: evaluation and selection committee process, UNOS waitlist management/multiple listings, types of organs offered (KDPI < 85%, KDPI > 85%, PHS increased risk, DCD), financial aspects, surgical procedures, dietary instruction pre- and post-transplant, health maintenance pre- and post-transplant, post-transplant hospitalization and outpatient follow-up, potential to participate in a research protocol, and medication management and side effects.  A question and answer session was provided after the presentation.    The patient was seen by all members of the multi-disciplinary team to include: Nephrologist/PA, Surgeon, , Transplant Coordinator, , Pharmacist and Dietician (if applicable).    The patient reviewed and signed all consents for evaluation which were witnessed and sent to scanning into the EPIC chart.    The patient was given an education book and plan for further evaluation based on her individual assessment.      The patient was encouraged to call with any questions or concerns.

## 2017-07-12 NOTE — LETTER
July 14, 2017        Dalton Heaton  [address] [facility] [address] [facility] [address]  [place] [address]  Phone: [phone]  Fax: [phone]             [facility]  [address]  [facility] [address]  Phone: [phone]   Patient: Andra Newell   MR Number: [id number]   YOB: 1960   Date of Visit: 7/12/2017       Dear Dr. Dalton Heaton    Thank you for referring Andra Newell to me for evaluation. Attached you will find relevant portions of my assessment and plan of care.    If you have questions, please do not hesitate to call me. I look forward to following Andra Newell along with you.    Sincerely,    Melissa Cramer, NP    Enclosure    If you would like to receive this communication electronically, please contact [email or web address] or [phone] to request [employer] access.    [employer] is a tool which provides read-only access to select patient information with whom you have a relationship. Its easy to use and provides real time access to review your patients record including encounter summaries, notes, results, and demographic information.    If you feel you have received this communication in error or would no longer like to receive these types of communications, please e-mail [email or web address]

## 2017-07-12 NOTE — PROGRESS NOTES
Pre Transplant Infectious Diseases Consult  Kidney Transplant Recipient Evaluation    Requesting Physician: Datlon Heaton    Reason for Visit:    Chief Complaint   Patient presents with    Kidney Transplant Pre-evaluation     History of Present Illness  Andra Newell is a 57 y.o. year old White female with advanced Kidney disease secondary to HTN and diabetic nephropathy currently being evaluated for Kidney transplant. Patient is currently pre-dialysis and has no access at this time.  Patient denies any recent fever, chills, or infective illnesses.    Of note, patient has a history of MRSA infection in 2015. She reports tearing her left achilles tendon two years ago. Two weeks post repair her tendon tore again and she underwent a second surgery and had a cast placed. This was complicated by development of a large abscess overlying her achilles with deterioration of the tendon. The abscess was I&D. Cultures grew MRSA. She underwent treatment with IV Vancomycin x 6 weeks. Her infection has now resolved. Surgical incision well healed.       1) Do you have a history of:   YES NO   Diabetes      [x] []     Diabetic Foot Infection/Bone Infection  [x]        []     Surgical Removal of Spleen   []  [x]                 2) Have you had recurrent infections involving:             YES NO  Sinus infections  []         [x]   Sore Throat   []         [x]                 Prostate Infections  []         [x]              Bladder Infections  []         [x]                     Kidney Infections  []         [x]                               Intestinal Infections  []         [x]      Skin Infections   []         [x]       Reproductive Infections          []  [x]   Periodontal Disease  []         [x]        3)Have you ever had: YES     NO UNKNOWN      Chicken Pox   [x]         []  []   Shingles   [x]         []  [] 3 months/ago  Orolabial Herpes             []  [x]  []   Genital Herpes  []         [x]  []   Cytomegalovirus  []          [x]  []   Emily-Barr Virus  []         [x]  []   Genital Warts   []         [x]  []   Hepatitis A   []         [x]  []   Hepatitis B   []         [x]  []   Hepatitis C   []         [x]  []   Syphilis   []         [x]  []   Gonorrhea   []         [x]  []   Pelvic Inflammatory   Disease   []         [x]  []   Chlamydia Infection  []         [x]  []   Intestinal parasites   or worms   []         [x]  []   Fungal Infections  []         [x]  []   Blood Infections  []         [x]  []       Comment:       4) Have you ever been exposed   YES NO  To someone with tuberculosis?  []   [x]   If yes, what treatment did you receive:     5) What states have you lived in? [place], [place]    6) What countries have you visited for more than 2 weeks? N/A                        YES NO  7) Did you have any associated infections?  []  [x]       8) Are you planning to travel outside the    []  [x]   [place] after your transplant?    9) Household                   YES NO  Do you have pets living in your house?    [x]         []   If yes, describe:  1 dog, indoor/outdoor, vaccinated    Do you spend time or live on a farm or     []         [x]   have livestock or other farm animals?  If yes, which ones:    Do you have a fish tank?          []  [x]       Do you have a litter box?      []         [x]     Do you fish or hunt?      []         [x]     Do you clean or skin fish or animals?    []         [x]     Do you consume raw or undercooked    []         [x]   meat, fish, or shellfish?      10) What occupations have you had? N/A, previously recreational     11) Patient reports hobby to be painting, indoor activities           12)Do you garden or otherwise  YES NO   work in the soil?    []         [x]   13)Do you hike, camp, or spend     time in wooded areas?   []         [x]        14) The patient's immunization history was reviewed.    Have you ever received:  YES NO UNKNOWN DATES   Routine Childhood vaccines  [x]         []   []      Influenza vaccine   [x]  []  []  2017   Pneumovax    [x]  []  []  2017    Tetanus-diptheria   [x]         []  [] 2005   Hepatitis A vaccine series       []  [x]  []    Hepatitis B vaccine series         []  [x]  []     Meningitis vaccine   []         [x]  []    Varicella vaccine   []         [x]  []        Based on the patients immunization history and serologies, immunizations were ordered:         Ordered  Not Ordered  Influenza Vaccine     []    [x]   Hepatitis A series at 0,  6 months   [x]    []   Hepatitis B seriesat 0, 1, and 6 months  [x]    []   Hepatitis B High Dose 0,1, and 6 months  []    [x]   Prevnar      []    [x]   Pneumovax      []    [x]    TDap       [x]    []    Zoster       []    [x]   Menactra      []    [x]            The patient was encouraged to contact us about any problems that may develop after immunization and possible side effects were reviewed.      Previous Transplant: no    Etiology of Kidney Disease: diabetic nephropathy and HTN    Allergies: Codeine  There is no immunization history for the selected administration types on file for this patient.  Past Medical History:   Diagnosis Date    Anemia     Arthritis     Asthma     allergic airway    Depression     Diabetes mellitus     Eosinophilia     Gout     Gout     Hyperlipidemia     Hypertension     Low back pain     MRSA carrier     Obesity     Renal disorder     Rotator cuff syndrome--left     Thyroid disease     Ulnar neuropathy of right upper extremity      Past Surgical History:   Procedure Laterality Date    ACHILLES TENDON SURGERY Left May 2015    BACK SURGERY      CHOLECYSTECTOMY      HEMORRHOID SURGERY      HIP SURGERY      JOINT REPLACEMENT      left    KNEE SURGERY      SHOULDER ARTHROSCOPY      SHOULDER SURGERY        Social History     Social History    Marital status: Single     Spouse name: N/A    Number of children: N/A    Years of education: N/A     Occupational History    Not  on file.     Social History Main Topics    Smoking status: Never Smoker    Smokeless tobacco: Never Used    Alcohol use No    Drug use: No    Sexual activity: No     Other Topics Concern    Not on file     Social History Narrative    No narrative on file       Review of Systems   Constitution: Positive for weakness, malaise/fatigue, night sweats and weight gain. Negative for chills, decreased appetite and fever.   HENT: Negative for congestion, ear pain, headaches, hearing loss, hoarse voice, sore throat and tinnitus.    Eyes: Positive for blurred vision. Negative for redness and visual disturbance.   Cardiovascular: Negative for chest pain, leg swelling and palpitations.   Respiratory: Negative for cough, hemoptysis, shortness of breath, sputum production and wheezing.    Endocrine: Negative for cold intolerance and heat intolerance.   Hematologic/Lymphatic: Negative for adenopathy. Does not bruise/bleed easily.   Skin: Positive for dry skin, itching and rash (on torso). Negative for suspicious lesions.   Musculoskeletal: Positive for back pain and joint pain. Negative for myalgias and neck pain.   Gastrointestinal: Positive for heartburn and nausea. Negative for abdominal pain, constipation, diarrhea and vomiting.   Genitourinary: Positive for flank pain and frequency (occasional). Negative for dysuria, hematuria, hesitancy and urgency.   Neurological: Positive for numbness. Negative for dizziness and paresthesias.   Psychiatric/Behavioral: Negative for depression and memory loss. The patient is nervous/anxious. The patient does not have insomnia.    Allergic/Immunologic: Negative for environmental allergies, HIV exposure, hives and persistent infections.     Physical Exam   Constitutional: She is oriented to person, place, and time. She appears well-developed and well-nourished. No distress.   HENT:   Head: Normocephalic and atraumatic.   Mouth/Throat: Uvula is midline, oropharynx is clear and moist and  mucous membranes are normal. No oral lesions. Normal dentition. No dental caries.   Eyes: Conjunctivae and EOM are normal. Pupils are equal, round, and reactive to light. No scleral icterus.   Neck: Normal range of motion.   Cardiovascular: Normal rate, regular rhythm and normal heart sounds.  Exam reveals no gallop and no friction rub.    No murmur heard.  Pulmonary/Chest: Effort normal and breath sounds normal. No respiratory distress. She has no wheezes. She has no rales.   Abdominal: Soft. Bowel sounds are normal. She exhibits no distension. There is no hepatosplenomegaly. There is no tenderness.   Obese   Musculoskeletal: She exhibits no edema.   Lymphadenopathy:        Head (right side): No submental, no submandibular, no tonsillar, no preauricular, no posterior auricular and no occipital adenopathy present.        Head (left side): No submental, no submandibular, no tonsillar, no preauricular, no posterior auricular and no occipital adenopathy present.     She has no cervical adenopathy.     She has no axillary adenopathy.        Right: No inguinal, no supraclavicular and no epitrochlear adenopathy present.        Left: No inguinal, no supraclavicular and no epitrochlear adenopathy present.   Neurological: She is alert and oriented to person, place, and time.   Skin: Skin is warm, dry and intact. Rash (scattered erythematous papular rash on back) noted.   Surgical scar overlying left achilles tendon well healed. No erythema, warmth or induration   Psychiatric: She has a normal mood and affect.     Diagnostics: No results found for: RPR  No results found for: CMVANTIBODIE  No results found for: HIV1X2  No results found for: HTLVIIIANTIB  Hepatitis B Surface Ag   Date Value Ref Range Status   07/12/2017 Negative  Final     Hep B Core Total Ab   Date Value Ref Range Status   07/12/2017 Negative  Final     Hepatitis C Ab   Date Value Ref Range Status   07/12/2017 Negative  Final     No results found for:  TOXOIGG  No components found for: TOXOIGGINTER  No results found for: QAL1ARL  No results found for: LXR2VQX  No results found for: VARICELLAZOS  No results found for: VARICELLAINT  No results found for: STRONGANTIGG  No results found for: EPSTEINBARRV  No results found for: HEPBSAB  No results found for: QUANTIFERON  No results found for: HEPAIGM  No results found for: PPD  No results found for this or any previous visit.         Transplant Infectious Diseases - Candidacy    Assessment/Plan:     Transplant Candidacy: Based on available information, there are no identified significant barriers to transplantation from an infectious disease standpoint pending acceptable serologies.  Quantiferon gold, HIV, RPR and Strongyloides IgG are pending. If positive, please refer to ID clinic. Patient noted to have eosinophilia on blood work. She is scheduled to see a hematologist for further evaluation. Consider dermatology evaluation if rash does not resolve and symptoms persist.       Immunizations needed:  - Hepatitis A/Hepatitis B combination vaccine today, in 1 month and 6 months. Rx given for second and third dose.  - Tdap today        Final determination of transplant candidacy will be made once evaluation is complete and reviewed by the Transplant Selection Committee.    Brook Rowell PA-C         Counseling:I discussed with Andra the risk for increased susceptibility to infections following transplantation including increased risk for infection right after transplant and if rejection should occur.  The patients has been counseled on the importance of vaccinations including but not limited to a yearly flu vaccine.  Specific guidance has been provided to the patient regarding the patients occupation, hobbies and activities to avoid future infectious complications including but not limited to avoiding undercooked meats and seafood, proper hygiene, and contact with animals.

## 2017-07-13 LAB
HEP. B SURF AB, QUAL: NEGATIVE
HEP. B SURF AB, QUANT.: <3 MIU/ML
VARICELLA INTERPRETATION: POSITIVE
VARICELLA ZOSTER IGG: 4.93 ISR

## 2017-07-14 LAB
CMV IGG SERPL QL IA: NORMAL
MITOGEN NIL: 9.36 IU/ML
NIL: 0.05 IU/ML
STRONGYLOIDES ANTIBODY IGG: POSITIVE
TB ANTIGEN NIL: 0 IU/ML
TB ANTIGEN: 0.06 IU/ML
TB GOLD: NEGATIVE

## 2017-07-16 ENCOUNTER — DOCUMENTATION ONLY (OUTPATIENT)
Dept: TRANSPLANT | Facility: CLINIC | Age: 57
End: 2017-07-16

## 2017-07-16 NOTE — PROGRESS NOTES
Transplant Recipient Adult Psychosocial Assessment    Andra Newell  [address] [facility] [address]    Telephone Information:   Mobile [phone]   Home  [phone] (home)  Work  There is no work phone number on file.  E-mail  [email or web address]    Sex: female  YOB: 1960  Age: 57 y.o.    Encounter Date: 7/12/2017  [place] Citizen: yes  Primary Language: English   Needed: no    Emergency Contact:  Any Alvarez, 57 yo partner/friend, Wayne La, does drive/own car, employed full time at [address]. [phone]    Family/Social Support:   Number of dependents/: Brian Palomo, 17 yo grand daughter. Family will assist with  for transplant.  Marital history:  x 2; in significant other relationship with Any Alvarez. Known Any for 20 years.  Other family dynamics: Pt reports highly supportive family system. Pt reports lives with 17 yo grand daughter Brian Palomo in [facility]. Pt reports is in significant other relationship with Any Alvarez who lives nearby. Pt reports having 2 sons Malik Palomo and Isaiah Palomo. Isaiah lives in [place] and will not be able to assist with transplant. Malik lives nearby in [facility] and will be able to assist with transplant if needed. Pt reports having good friend Ashleigh Chen living in [facility] who will be available to assist with transplant as needed. Pt's partner Any Alvarez was with pt for pre transplant work up and reports will be patient's primary organ transplant caregiver.    Household Composition:  Brian Palomo, 17 yo grand daughter.    Do you and your caregivers have access to reliable transportation? yes  PRIMARY CAREGIVER: Any Alvarez, partner, will be primary caregiver, phone number [phone].      provided in-depth information to patient and caregiver regarding pre- and post-transplant caregiver role.   strongly encourages patient and  caregiver to have concrete plan regarding post-transplant care giving, including back-up caregiver(s) to ensure care giving needs are met as needed.    Patient and Caregiver states understanding all aspects of caregiver role/commitment and is able/willing/committed to being caregiver to the fullest extent necessary.    Patient and Caregiver verbalizes understanding of the education provided today and caregiver responsibilities.         remains available. Patient and Caregiver agree to contact  in a timely manner if concerns arise.      Able to take time off work without financial concerns: yes.     Additional Significant Others who will Assist with Transplant:  Ashleigh Chen, 44 yo friend for 5 years, [place], does drive/own car, unemployed disabled (no issues being caregiver). [phone]  Malik Palomo, 40 yo son, [place], does drive/own car. [phone]  Isaiah Palomo, 31 yo son, [place], does drive/own car, [employer]. [phone]    Living Will: yes  Healthcare Power of : yes  Advance Directives on file: <Received per medical record.  Verbally reviewed LW/HCPA information.   provided patient with copy of LW/HCPA documents and provided education on completion of forms.    Living Donors: Education and resource information given to patient.    Highest Education Level: High School (9-12) or GED  Reading Ability: 12th grade  Reports difficulty with: wears glasses. Pt denies any problems learning new information.  Learns Best By:  Reading about, seeing and doing new task until proficient     Status: no  VA Benefits: no     Working for Income: No  If no, reason not working: Disability    Patient reports is currently disabled since 2015 after work related injury. 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    Spouse/Significant Other Employment: pt reports is not     Disabled: yes due to work  injury 2015    Monthly Income:  $490  Able to afford all costs now and if transplanted, including medications: yes pt reports medcine co-pay is 50 Cents to $3.00   Patient and Caregiver verbalizes understanding of personal responsibilities related to transplant costs and the importance of having a financial plan to ensure that patients transplant costs are fully covered.       provided fundraising information/education. Patient and Caregiververbalizes understanding.   remains available.    Insurance:   Payor/Plan Subscr  Sex Relation Sub. Ins. ID Effective Group Num   1. UNITED RESOUR* VIRGIE DUKE * 1960 Female  [id number] [id number]                                   [address] [address]   2. MEDICAID - * VIRGIE DUKE * 1960 Female  [id number] LABYHP                                   [address] [address]     Primary Insurance (for UNOS reporting): Public Insurance - Medicaid  Secondary Insurance (for UNOS reporting): None  Patient and Caregiver verbalizes clear understanding that patient may experience difficulty obtaining and/or be denied insurance coverage post-surgery. This includes and is not limited to disability insurance, life insurance, health insurance, burial insurance, long term care insurance, and other insurances.      Patient and Caregiver also reports understanding that future health concerns related to or unrelated to transplantation may not be covered by patient's insurance.  Resources and information provided and reviewed.     Patient and Caregiver provides verbal permission to release any necessary information to outside resources for patient care and discharge planning.  Resources and information provided are reviewed.      Pt reports usually obtains out pt medicines from [facility]. Pt reports is in agreement to using [facility] for transplant medicines.    Pt reports some caregivers work and may need employer paperwork completed for  time missed due to transplant.    Pt reports understanding [facility] will not be assisting with any transplant related costs such as lodging, meals and travel costs for transplant.    Dialysis Adherence: Patient reports is not currently on dialysis.      Nephrologist: Dr. Dalton Heaton, [phone]. Pt reports high compliance with all medical appointments and instructions. Nephrology compliance update needed for transplant suitability.    Infusion Service: patient utilizing? no  Home Health: patient utilizing? no  DME: scooter for long distances.  Pulmonary/Cardiac Rehab: pt denies  ADLS:  Problems bathing, walking, housekeeping and cooking.    Adherence:   Pt reports high adherence with all medical office visits and instructions.  Adherence education and counseling provided.     Per History Section:Past Medical History:   Diagnosis Date    Anemia     Arthritis     Asthma     allergic airway    Depression     Diabetes mellitus     Eosinophilia     Gout     Gout     Hyperlipidemia     Hypertension     Low back pain     MRSA carrier     Obesity     Renal disorder     Rotator cuff syndrome--left     Thyroid disease     Ulnar neuropathy of right upper extremity      Social History   Substance Use Topics    Smoking status: Never Smoker    Smokeless tobacco: Never Used    Alcohol use No     History   Drug Use No     History   Sexual Activity    Sexual activity: No       Per Today's Psychosocial:  Tobacco: none, patient denies any use.  Alcohol: none, patient denies any use.  Illicit Drugs/Non-prescribed Medications: none, patient denies any use.    Patient and Caregiver states clear understanding of the potential impact of substance use as it relates to transplant candidacy and is aware of possible random substance screening.  Substance abstinence/cessation counseling, education and resources provided and reviewed.     Arrests/DWI/Treatment/Rehab: patient denies    Psychiatric History:    Mental  Health: Pt reports history of suicidal ideation in 2015 after a significant life event of injury at work and impact to her ADL independence. Pt reports was in mental health hospital [facility] for stabilization and was referred to out pt therapy. Pt reports currently in weekly psychotherapy with Mague Muniz LCSW in [facility]. Pt reports psychiatrist Dr. Sheth in [place] 1 x month. Pt reports plan to have psychiatry clearance letter from Dr. Sheth provided to transplant team.  Psychiatrist/Counselor: Dr. Sheth ([facility]) and Mague Muniz ([facility])  Medications:  Lorazepam 1 mg daily and Vilbryd 40 mg daily.   Suicide/Homicide Issues: 2015 [facility] hospitalization for si  Safety at home: Pt reports no problems with safety in the home.    Knowledge: Patient and Caregiver states having clear understanding and realistic expectations regarding the potential risks and potential benefits of organ transplantation and organ donation and agrees to discuss with health care team members and support system members, as well as to utilize available resources and express questions and/or concerns in order to further facilitate the pt informed decision-making.  Resources and information provided and reviewed.    Patient and Caregiver is aware of [facility] affiliation and/or partnership with agencies in home health care, LTAC, SNF, [facility], and other hospitals and clinics.    Understanding: Patient and Caregiver reports having a clear understanding of the many lifetime commitments involved with being a transplant recipient, including costs, compliance, medications, lab work, procedures, appointments, concrete and financial planning, preparedness, timely and appropriate communication of concerns, abstinence (ETOH, tobacco, illicit non-prescribed drugs), adherence to all health care team recommendations, support system and caregiver involvement, appropriate and timely resource utilization and follow-through, mental health  counseling as needed/recommended, and patient and caregiver responsibilities.  Social Service Handbook, resources and detailed educational information provided and reviewed.  Educational information provided.    Patient and Caregiver also reports current and expected compliance with health care regime and states having a clear understanding of the importance of compliance.      Patient and Caregiver reports a clear understanding that risks and benefits may be involved with organ transplantation and with organ donation.       Patient and Caregiver also reports clear understanding that psychosocial risk factors may affect patient, and include but are not limited to feelings of depression, generalized anxiety, anxiety regarding dependence on others, post traumatic stress disorder, feelings of guilt and other emotional and/or mental concerns, and/or exacerbation of existing mental health concerns.  Detailed resources provided and discussed.      Patient and Caregiver agrees to access appropriate resources in a timely manner as needed and/or as recommended, and to communicate concerns appropriately.  Patient and Caregiver also reports a clear understanding of treatment options available.     Patient and Caregiver received education in a group setting.   reviewed education, provided additional information, and answered questions.    Feelings or Concerns: Pt denies any overwhelming feelings or concerns regarding organ transplant. Pt reports high motivation to pursue organ transplant at this time.    Coping: Pt reports is coping well over all with ESRD and need for organ transplant. Pt reports feels best when relaxing and participating in volunteer work at [facility].    Goals: Pt reports hope to have successful organ transplant before needing dialysis.  Patient referred to Vocational Rehabilitation.    Interview Behavior: Patient and Caregiver presents as alert and oriented x 4, pleasant, good eye contact,  well groomed, recall good, concentration/judgement good, average intelligence, calm, communicative, cooperative and asking and answering questions appropriately. Pt's partner Any was with pt for pre transplant work up today and was highly supportive of pt throughout the interview.         Transplant Social Work - Candidacy  Assessment/Plan:     Psychosocial Suitability: Patient presents as an unacceptable candidate for kidney transplant at this time due to need for nephrology compliance update and psychiatry clearance. Based on psychosocial risk factors, patient presents as medium risk due to past mental health hospitalization and need for continued mental health support.  If patient's nephrology compliance update is suitable and if pt's private psychiatrist clears patient and patient is compliant with all psychiatry recommendations, then patient will be an acceptable candidate for organ transplant at that time. Pt reports having transplant caregiver/transportation plan, medical insurance plan and plan to afford transplant costs all in place.    Recommendations/Additional Comments: Nephrology compliance update needed for suitability. Psychiatry clearance from pt's private psychiatrist needed for transplant suitability.    Pt lives nearby in [facility] and will most likely return home with assistance from family post transplant hospitalization. Pt reports hearing remark by transplant provider she may need to stay nearby hospital post transplant and transplant  will work closely with patient and transplant team should apartment be indicated. Pt understands there is no guarantee [facility] transplant lodging will be available for patient since she does live nearby and there would need to be an exception made by transplant team administration for transplant lodging be available to her.     Pt reports understanding [facility] does not assist with any transplant costs such as meals, travel and lodging.     Pt  reports usually obtains out pt medicines from [facility]. Pt reports is in agreement to using [facility] for transplant medicines.    Pt reports some caregivers work and may need employer paperwork completed for time missed due to transplant.    Pt reports understanding [facility] will not be assisting with any transplant related costs such as lodging, meals and travel costs for transplant.    Yanira Valle MSW LCSW

## 2017-07-17 DIAGNOSIS — Z76.82 ORGAN TRANSPLANT CANDIDATE: Primary | ICD-10-CM

## 2017-07-19 ENCOUNTER — TELEPHONE (OUTPATIENT)
Dept: TRANSPLANT | Facility: CLINIC | Age: 57
End: 2017-07-19

## 2017-07-19 DIAGNOSIS — Z76.82 ORGAN TRANSPLANT CANDIDATE: Primary | ICD-10-CM

## 2017-07-19 LAB
B CELL RESULTS - XM AUTO: NEGATIVE
B MCS AVERAGE - XM AUTO: -12.5
FXMAU TESTING DATE: NORMAL
HLA AB QL: POSITIVE
HLA AB SERPL: POSITIVE
HLATY INTERPRETATION: NORMAL
SCRFL TESTING DATE: NORMAL
SERUM COLLECTION DT - XM AUTO: NORMAL
SERUM COLLECTION DT: NORMAL
T CELL RESULTS - XM AUTO: NEGATIVE
T MCS AVERAGE - XM AUTO: -4.5

## 2017-07-19 NOTE — TELEPHONE ENCOUNTER
Called patient to inform of +strongyloides and need for ID appt. Answered all questions, pt voiced understanding.

## 2017-07-20 DIAGNOSIS — Z76.82 ORGAN TRANSPLANT CANDIDATE: Primary | ICD-10-CM

## 2017-07-24 ENCOUNTER — LAB VISIT (OUTPATIENT)
Dept: LAB | Facility: HOSPITAL | Age: 57
End: 2017-07-24
Attending: INTERNAL MEDICINE
Payer: MEDICAID

## 2017-07-24 DIAGNOSIS — D64.9 ANEMIA, UNSPECIFIED: Primary | ICD-10-CM

## 2017-07-24 LAB
ALBUMIN SERPL BCP-MCNC: 3.7 G/DL
ALP SERPL-CCNC: 212 U/L
ALT SERPL W/O P-5'-P-CCNC: 21 U/L
ANION GAP SERPL CALC-SCNC: 11 MMOL/L
AST SERPL-CCNC: 18 U/L
BASOPHILS # BLD AUTO: 0 K/UL
BASOPHILS NFR BLD: 0.6 %
BILIRUB SERPL-MCNC: 0.4 MG/DL
BUN SERPL-MCNC: 60 MG/DL
CALCIUM SERPL-MCNC: 9.6 MG/DL
CHLORIDE SERPL-SCNC: 107 MMOL/L
CO2 SERPL-SCNC: 22 MMOL/L
CREAT SERPL-MCNC: 3 MG/DL
DIFFERENTIAL METHOD: ABNORMAL
EOSINOPHIL # BLD AUTO: 1 K/UL
EOSINOPHIL NFR BLD: 12.7 %
ERYTHROCYTE [DISTWIDTH] IN BLOOD BY AUTOMATED COUNT: 16.7 %
EST. GFR  (AFRICAN AMERICAN): 19 ML/MIN/1.73 M^2
EST. GFR  (NON AFRICAN AMERICAN): 16 ML/MIN/1.73 M^2
GLUCOSE SERPL-MCNC: 176 MG/DL
HCT VFR BLD AUTO: 34.2 %
HGB BLD-MCNC: 11.2 G/DL
LYMPHOCYTES # BLD AUTO: 1.9 K/UL
LYMPHOCYTES NFR BLD: 23.7 %
MCH RBC QN AUTO: 27.8 PG
MCHC RBC AUTO-ENTMCNC: 32.7 G/DL
MCV RBC AUTO: 85 FL
MONOCYTES # BLD AUTO: 0.7 K/UL
MONOCYTES NFR BLD: 8.7 %
NEUTROPHILS # BLD AUTO: 4.3 K/UL
NEUTROPHILS NFR BLD: 54.3 %
PLATELET # BLD AUTO: 210 K/UL
PMV BLD AUTO: 8.2 FL
POTASSIUM SERPL-SCNC: 5.7 MMOL/L
PROT SERPL-MCNC: 7.7 G/DL
RBC # BLD AUTO: 4.02 M/UL
SODIUM SERPL-SCNC: 140 MMOL/L
WBC # BLD AUTO: 7.9 K/UL

## 2017-07-24 PROCEDURE — 80053 COMPREHEN METABOLIC PANEL: CPT | Mod: TXP

## 2017-07-24 PROCEDURE — 85025 COMPLETE CBC W/AUTO DIFF WBC: CPT | Mod: TXP

## 2017-07-26 ENCOUNTER — HOSPITAL ENCOUNTER (OUTPATIENT)
Dept: CARDIOLOGY | Facility: HOSPITAL | Age: 57
Discharge: HOME | End: 2017-07-26
Attending: NURSE PRACTITIONER
Payer: MEDICAID

## 2017-07-26 DIAGNOSIS — Z76.82 ORGAN TRANSPLANT CANDIDATE: Primary | ICD-10-CM

## 2017-07-26 DIAGNOSIS — Z76.82 ORGAN TRANSPLANT CANDIDATE: ICD-10-CM

## 2017-07-26 LAB
AORTIC VALVE REGURGITATION: ABNORMAL
DIASTOLIC DYSFUNCTION: NO
ESTIMATED PA SYSTOLIC PRESSURE: 41.44
HLA DRB4 1: NORMAL
HLA SSO DNA TYPING CLASS I & II INTERPRETATION: NORMAL
HLA-A 1 SERO. EQUIV: 1
HLA-A 1: NORMAL
HLA-A 2 SERO. EQUIV: 11
HLA-A 2: NORMAL
HLA-B 1 SERO. EQUIV: 7
HLA-B 1: NORMAL
HLA-B 2 SERO. EQUIV: NORMAL
HLA-B 2: NORMAL
HLA-BW 1 SERO. EQUIV: 6
HLA-BW 2 SERO. EQUIV: NORMAL
HLA-C 1: NORMAL
HLA-C 2: NORMAL
HLA-CW 1 SERO. EQUIV: 7
HLA-CW 2 SERO. EQUIV: NORMAL
HLA-DQ 1 SERO. EQUIV: 6
HLA-DQ 2 SERO. EQUIV: NORMAL
HLA-DQB1 1: NORMAL
HLA-DQB1 2: NORMAL
HLA-DRB1 1 SERO. EQUIV: 15
HLA-DRB1 1: NORMAL
HLA-DRB1 2 SERO. EQUIV: NORMAL
HLA-DRB1 2: NORMAL
HLA-DRB3 1: NORMAL
HLA-DRB3 2: NORMAL
HLA-DRB345 1 SERO. EQUIV: 51
HLA-DRB345 2 SERO. EQUIV: NORMAL
HLA-DRB4 2: NORMAL
HLA-DRB5 1: NORMAL
HLA-DRB5 2: NORMAL
RETIRED EF AND QEF - SEE NOTES: 60 (ref 55–65)
SSDQB TESTING DATE: NORMAL
SSDRB TESTING DATE: NORMAL
SSOA TESTING DATE: NORMAL
SSOB TESTING DATE: NORMAL
SSOC TESTING DATE: NORMAL
SSODR TESTING DATE: NORMAL
TRICUSPID VALVE REGURGITATION: ABNORMAL
TYSSO TESTING DATE: NORMAL

## 2017-07-26 PROCEDURE — 93351 STRESS TTE COMPLETE: CPT | Mod: 26,TXP,, | Performed by: INTERNAL MEDICINE

## 2017-07-26 PROCEDURE — 93325 DOPPLER ECHO COLOR FLOW MAPG: CPT | Mod: 26,TXP,, | Performed by: INTERNAL MEDICINE

## 2017-07-26 PROCEDURE — 93320 DOPPLER ECHO COMPLETE: CPT | Mod: TXP

## 2017-07-26 PROCEDURE — 63600175 PHARM REV CODE 636 W HCPCS: Mod: TXP

## 2017-07-26 PROCEDURE — 93325 DOPPLER ECHO COLOR FLOW MAPG: CPT | Mod: TXP

## 2017-07-26 PROCEDURE — 93320 DOPPLER ECHO COMPLETE: CPT | Mod: 26,TXP,, | Performed by: INTERNAL MEDICINE

## 2017-07-26 PROCEDURE — 63600175 PHARM REV CODE 636 W HCPCS: Mod: TXP | Performed by: INTERNAL MEDICINE

## 2017-07-26 RX ORDER — ATROPINE SULFATE 0.1 MG/ML
INJECTION INTRAVENOUS
Status: DISCONTINUED
Start: 2017-07-26 | End: 2017-07-26 | Stop reason: WASHOUT

## 2017-07-26 RX ORDER — DOBUTAMINE HYDROCHLORIDE 200 MG/100ML
INJECTION INTRAVENOUS
Status: DISPENSED
Start: 2017-07-26 | End: 2017-07-26

## 2017-07-26 NOTE — PROGRESS NOTES
Echo portion of Dobutamine stress echo with colorflow doppler and Optison completed.  Interpreting MD cade.

## 2017-07-28 DIAGNOSIS — Z12.11 COLON CANCER SCREENING: Primary | ICD-10-CM

## 2017-07-31 ENCOUNTER — SOCIAL WORK (OUTPATIENT)
Dept: TRANSPLANT | Facility: CLINIC | Age: 57
End: 2017-07-31

## 2017-07-31 NOTE — PROGRESS NOTES
7-31-17 nephrology compliance update and mental health suitability update.    Nephrology compliance update:  Nephrologist: Dr. Dalton Heaton, [phone] spoke with Stefani. Nephrology office reports pt is highly compliant with all nephrology appointments and instructions.    Mental health suitability update:  Pt reports private psychiatrist Dr. Sheth of [facility] to write transplant clearance letter and patient reports will provide to transplant  Thursday August 3, 2017.    Psychosocial Suitability: Patient presents as an unacceptable candidate for kidney transplant at this time due to need for psychiatry clearance. Based on psychosocial risk factors, patient presents as medium risk due to past mental health hospitalization and need for continued mental health support.  If pt's private psychiatrist clears patient and patient is compliant with all psychiatry recommendations, then patient will be an acceptable candidate for organ transplant at that time. Pt reports having transplant caregiver/transportation plan, medical insurance plan and plan to afford transplant costs all in place.    Transplant nurse notified of above.    Yanira aVlle, MSW \[facility]\""W

## 2017-08-03 ENCOUNTER — OFFICE VISIT (OUTPATIENT)
Dept: INFECTIOUS DISEASES | Facility: CLINIC | Age: 57
End: 2017-08-03
Payer: MEDICAID

## 2017-08-03 ENCOUNTER — SOCIAL WORK (OUTPATIENT)
Dept: TRANSPLANT | Facility: CLINIC | Age: 57
End: 2017-08-03

## 2017-08-03 VITALS
HEART RATE: 98 BPM | SYSTOLIC BLOOD PRESSURE: 190 MMHG | TEMPERATURE: 98 F | WEIGHT: 240.31 LBS | BODY MASS INDEX: 38.62 KG/M2 | HEIGHT: 66 IN | DIASTOLIC BLOOD PRESSURE: 97 MMHG

## 2017-08-03 DIAGNOSIS — B78.0 INTESTINAL STRONGYLOIDIASIS: Primary | ICD-10-CM

## 2017-08-03 LAB
CLASS I ANTIBODIES - LUMINEX: NORMAL
CLASS I ANTIBODY COMMENTS - LUMINEX: NORMAL
CLASS II ANTIBODIES - LUMINEX: NORMAL
CLASS II ANTIBODY COMMENTS - LUMINEX: NORMAL
CPRA %: 86
SERUM COLLECTION DT - LUMINEX CLASS I: NORMAL
SERUM COLLECTION DT - LUMINEX CLASS II: NORMAL
SPCL1 TESTING DATE: NORMAL
SPCL2 TESTING DATE: NORMAL

## 2017-08-03 PROCEDURE — 99999 PR PBB SHADOW E&M-EST. PATIENT-LVL IV: CPT | Mod: PBBFAC,TXP,, | Performed by: INTERNAL MEDICINE

## 2017-08-03 PROCEDURE — 99213 OFFICE O/P EST LOW 20 MIN: CPT | Mod: S$GLB,TXP,, | Performed by: INTERNAL MEDICINE

## 2017-08-03 PROCEDURE — 3008F BODY MASS INDEX DOCD: CPT | Mod: S$GLB,TXP,, | Performed by: INTERNAL MEDICINE

## 2017-08-03 PROCEDURE — 99214 OFFICE O/P EST MOD 30 MIN: CPT | Mod: PBBFAC,TXP | Performed by: INTERNAL MEDICINE

## 2017-08-03 RX ORDER — IVERMECTIN 3 MG/1
21 TABLET ORAL DAILY
Qty: 14 TABLET | Refills: 0 | Status: SHIPPED | OUTPATIENT
Start: 2017-08-03 | End: 2017-08-05

## 2017-08-03 RX ORDER — BENZONATATE 100 MG/1
CAPSULE ORAL
Status: ON HOLD | COMMUNITY
Start: 2017-07-31 | End: 2017-09-06 | Stop reason: HOSPADM

## 2017-08-03 RX ORDER — HYDROCODONE BITARTRATE AND HOMATROPINE METHYLBROMIDE ORAL SOLUTION 5; 1.5 MG/5ML; MG/5ML
LIQUID ORAL
Status: ON HOLD | COMMUNITY
Start: 2017-07-31 | End: 2017-09-06 | Stop reason: HOSPADM

## 2017-08-03 RX ORDER — ALBUTEROL SULFATE 90 UG/1
AEROSOL, METERED RESPIRATORY (INHALATION)
Status: ON HOLD | COMMUNITY
Start: 2017-07-31 | End: 2017-09-06 | Stop reason: HOSPADM

## 2017-08-03 NOTE — PROGRESS NOTES
Mental health follow up note.      Pt's psychiatrist:  Antonio Sheth MD  [facility], [facility]  [address], [facility] [address]   [phone]    Pt provided 7-27-17 psychiatry clearance letter from Dr. Antonio Sheth reporting patient is suitable candidate for organ transplant. Letter provided to pre transplant nurse coordinator to place in patient's medical chart.    Psychosocial Suitability: Patient presents as an acceptable candidate for kidney transplant at this time. Based on psychosocial risk factors, patient presents as medium risk due to past mental health hospitalization and need for continued mental health support.  Pt's private psychiatrist Dr. Antonio Sheth has provided organ transplant psychiatry clearance letter to transplant team.  Pt reports having transplant caregiver/transportation plan, medical insurance plan and plan to afford transplant costs all in place.    Yanira Valle MSW [facility]

## 2017-08-07 NOTE — PROGRESS NOTES
I have seen and examined the patient with the fellow and I agree with the fellow's history, physical exam, assessment and plan as stated. The plan of care has been discussed with the patient and the fellow.

## 2017-08-14 ENCOUNTER — CLINICAL SUPPORT (OUTPATIENT)
Dept: INFECTIOUS DISEASES | Facility: CLINIC | Age: 57
End: 2017-08-14
Payer: MEDICAID

## 2017-08-14 PROCEDURE — 90636 HEP A/HEP B VACC ADULT IM: CPT | Mod: PBBFAC,TXP

## 2017-08-22 ENCOUNTER — OFFICE VISIT (OUTPATIENT)
Dept: OBSTETRICS AND GYNECOLOGY | Facility: CLINIC | Age: 57
End: 2017-08-22
Payer: MEDICAID

## 2017-08-22 ENCOUNTER — HOSPITAL ENCOUNTER (OUTPATIENT)
Dept: RADIOLOGY | Facility: CLINIC | Age: 57
Discharge: HOME | End: 2017-08-22
Attending: NURSE PRACTITIONER
Payer: COMMERCIAL

## 2017-08-22 VITALS
DIASTOLIC BLOOD PRESSURE: 88 MMHG | HEART RATE: 85 BPM | BODY MASS INDEX: 36.92 KG/M2 | SYSTOLIC BLOOD PRESSURE: 143 MMHG | HEIGHT: 67 IN | WEIGHT: 235.25 LBS

## 2017-08-22 DIAGNOSIS — Z76.82 ORGAN TRANSPLANT CANDIDATE: ICD-10-CM

## 2017-08-22 DIAGNOSIS — Z01.419 ENCOUNTER FOR WELL WOMAN EXAM: Primary | ICD-10-CM

## 2017-08-22 DIAGNOSIS — Z12.31 ENCOUNTER FOR SCREENING MAMMOGRAM FOR MALIGNANT NEOPLASM OF BREAST: ICD-10-CM

## 2017-08-22 PROCEDURE — 99999 PR PBB SHADOW E&M-EST. PATIENT-LVL IV: CPT | Mod: PBBFAC,,, | Performed by: NURSE PRACTITIONER

## 2017-08-22 PROCEDURE — 77067 SCR MAMMO BI INCL CAD: CPT | Mod: 26,TXP,, | Performed by: RADIOLOGY

## 2017-08-22 PROCEDURE — 88175 CYTOPATH C/V AUTO FLUID REDO: CPT

## 2017-08-22 PROCEDURE — 99386 PREV VISIT NEW AGE 40-64: CPT | Mod: S$PBB,,, | Performed by: NURSE PRACTITIONER

## 2017-08-22 PROCEDURE — 99214 OFFICE O/P EST MOD 30 MIN: CPT | Mod: PBBFAC,PO,TXP | Performed by: NURSE PRACTITIONER

## 2017-08-22 PROCEDURE — 77063 BREAST TOMOSYNTHESIS BI: CPT | Mod: 26,TXP,, | Performed by: RADIOLOGY

## 2017-08-22 NOTE — LETTER
August 22, 2017      Melissa Cramer, FIDENCIO  [address] [facility] [facility]  [place] [address]           [facility] - OB/ GYN  [address] [place] [address] [address]  [facility] [address]  Phone: [phone]          Patient: Andra Newell   MR Number: [id number]   YOB: 1960   Date of Visit: 8/22/2017       Dear Melissa Cramer:    Thank you for referring Andra Newell to me for evaluation. Attached you will find relevant portions of my assessment and plan of care.    If you have questions, please do not hesitate to call me. I look forward to following Andra Newell along with you.    Sincerely,    Lesly Mendez NP    Enclosure  CC:  No Recipients    If you would like to receive this communication electronically, please contact [email or web address] or [phone] to request more information on [facility] access.    For providers and/or their staff who would like to refer a patient to [facility], please contact us through our one-stop-shop provider referral line, [facility], at [phone].    If you feel you have received this communication in error or would no longer like to receive these types of communications, please e-mail [email or web address]

## 2017-08-29 ENCOUNTER — LAB VISIT (OUTPATIENT)
Dept: LAB | Facility: HOSPITAL | Age: 57
End: 2017-08-29
Attending: INTERNAL MEDICINE
Payer: MEDICAID

## 2017-08-29 ENCOUNTER — TELEPHONE (OUTPATIENT)
Dept: RADIOLOGY | Facility: CLINIC | Age: 57
End: 2017-08-29

## 2017-08-29 DIAGNOSIS — E03.9 UNSPECIFIED HYPOTHYROIDISM: ICD-10-CM

## 2017-08-29 DIAGNOSIS — F45.8 ANXIETY HYPERVENTILATION: ICD-10-CM

## 2017-08-29 DIAGNOSIS — F41.9 ANXIETY HYPERVENTILATION: ICD-10-CM

## 2017-08-29 DIAGNOSIS — N18.4 CHRONIC KIDNEY DISEASE, STAGE IV (SEVERE): ICD-10-CM

## 2017-08-29 DIAGNOSIS — N18.9 ANEMIA IN CHRONIC KIDNEY DISEASE(285.21): ICD-10-CM

## 2017-08-29 DIAGNOSIS — E78.5 OTHER AND UNSPECIFIED HYPERLIPIDEMIA: ICD-10-CM

## 2017-08-29 DIAGNOSIS — E11.22 TYPE 2 DIABETES MELLITUS WITH END-STAGE RENAL DISEASE: ICD-10-CM

## 2017-08-29 DIAGNOSIS — N18.6 TYPE 2 DIABETES MELLITUS WITH END-STAGE RENAL DISEASE: ICD-10-CM

## 2017-08-29 DIAGNOSIS — N10 ACUTE PYELONEPHRITIS WITHOUT LESION OF RENAL MEDULLARY NECROSIS: Primary | ICD-10-CM

## 2017-08-29 DIAGNOSIS — F33.40 RECURRENT MAJOR DEPRESSION IN REMISSION: ICD-10-CM

## 2017-08-29 DIAGNOSIS — D63.1 ANEMIA IN CHRONIC KIDNEY DISEASE(285.21): ICD-10-CM

## 2017-08-29 DIAGNOSIS — D72.10 EOSINOPHILIA: ICD-10-CM

## 2017-08-29 LAB
25(OH)D3+25(OH)D2 SERPL-MCNC: 18 NG/ML
ALBUMIN SERPL BCP-MCNC: 3.5 G/DL
ANION GAP SERPL CALC-SCNC: 11 MMOL/L
BUN SERPL-MCNC: 46 MG/DL
CALCIUM SERPL-MCNC: 9.8 MG/DL
CHLORIDE SERPL-SCNC: 107 MMOL/L
CO2 SERPL-SCNC: 26 MMOL/L
CREAT SERPL-MCNC: 2.7 MG/DL
EST. GFR  (AFRICAN AMERICAN): 21 ML/MIN/1.73 M^2
EST. GFR  (NON AFRICAN AMERICAN): 19 ML/MIN/1.73 M^2
GLUCOSE SERPL-MCNC: 99 MG/DL
PHOSPHATE SERPL-MCNC: 4.2 MG/DL
POTASSIUM SERPL-SCNC: 4.6 MMOL/L
PTH-INTACT SERPL-MCNC: 253 PG/ML
SODIUM SERPL-SCNC: 144 MMOL/L

## 2017-08-29 PROCEDURE — 36415 COLL VENOUS BLD VENIPUNCTURE: CPT | Mod: TXP

## 2017-08-29 PROCEDURE — 83970 ASSAY OF PARATHORMONE: CPT | Mod: TXP

## 2017-08-29 PROCEDURE — 82306 VITAMIN D 25 HYDROXY: CPT | Mod: TXP

## 2017-08-29 PROCEDURE — 80069 RENAL FUNCTION PANEL: CPT | Mod: TXP

## 2017-09-05 ENCOUNTER — TELEPHONE (OUTPATIENT)
Dept: ENDOCRINOLOGY | Facility: CLINIC | Age: 57
End: 2017-09-05

## 2017-09-05 NOTE — TELEPHONE ENCOUNTER
----- Message from Arielle Dover sent at 8/15/2017 10:54 AM CDT -----  Contact: Arielle Dover with patient....[phone]  FYI patient needs medicaid override for sooner appointment. department has been messaged 2X    The above patient needs an appointment with endocrinology for low thyroid. Patient is a kidney transplant evaluation and needs a endocrine evaluation with clearance. Please schedule first available Tuesday or Thursday.    Thank You,   Arielle Barbosainson Andra Wood   Female, 57 y.o., 1960  Weight:   109 kg (240 lb 4.8 oz)  Phone:   [phone]  PCP:   Diony Alvarado MD  Language:   English  Need Interp:   No  Allergies:   Codeine  Health Maintenance:   Due  FYI  General  Primary Ins.:   [facility]  MRN:   [id number]  Pt Comm Pref:   None  Patient Portal:   Active  Next Appt:   08/22/2017  My Sticky Note:    Message   Received: 2 weeks ago   Message Contents   Arielle ESPINOZA [facility] 6th Floor Clinical Support  Caller: Arielle Blas @ [phone] (2 weeks ago)         The above patient needs an appointment with endocrinology for low thyroid. Patient is a kidney transplant evaluation and needs a endocrine evaluation with clearance. Please schedule first available Tuesday or Thursday.

## 2017-09-06 ENCOUNTER — SURGERY (OUTPATIENT)
Age: 57
End: 2017-09-06

## 2017-09-06 ENCOUNTER — HOSPITAL ENCOUNTER (OUTPATIENT)
Facility: HOSPITAL | Age: 57
Discharge: HOME | End: 2017-09-06
Attending: INTERNAL MEDICINE | Admitting: INTERNAL MEDICINE
Payer: COMMERCIAL

## 2017-09-06 ENCOUNTER — ANESTHESIA EVENT (OUTPATIENT)
Dept: ENDOSCOPY | Facility: HOSPITAL | Age: 57
End: 2017-09-06
Payer: MEDICAID

## 2017-09-06 ENCOUNTER — ANESTHESIA (OUTPATIENT)
Dept: ENDOSCOPY | Facility: HOSPITAL | Age: 57
End: 2017-09-06
Payer: MEDICAID

## 2017-09-06 VITALS
DIASTOLIC BLOOD PRESSURE: 80 MMHG | OXYGEN SATURATION: 96 % | HEIGHT: 67 IN | TEMPERATURE: 98 F | BODY MASS INDEX: 36.88 KG/M2 | WEIGHT: 235 LBS | RESPIRATION RATE: 18 BRPM | SYSTOLIC BLOOD PRESSURE: 152 MMHG | HEART RATE: 62 BPM

## 2017-09-06 VITALS — RESPIRATION RATE: 45 BRPM

## 2017-09-06 DIAGNOSIS — Z12.11 SCREEN FOR COLON CANCER: ICD-10-CM

## 2017-09-06 DIAGNOSIS — K63.5 POLYP OF COLON, UNSPECIFIED PART OF COLON, UNSPECIFIED TYPE: Primary | ICD-10-CM

## 2017-09-06 PROBLEM — K57.30 DIVERTICULOSIS OF LARGE INTESTINE WITHOUT HEMORRHAGE: Status: ACTIVE | Noted: 2017-09-06

## 2017-09-06 LAB — POCT GLUCOSE: 88 MG/DL (ref 70–110)

## 2017-09-06 PROCEDURE — 45385 COLONOSCOPY W/LESION REMOVAL: CPT | Mod: TXP,,, | Performed by: INTERNAL MEDICINE

## 2017-09-06 PROCEDURE — D9220A PRA ANESTHESIA: Mod: ANES,TXP,, | Performed by: ANESTHESIOLOGY

## 2017-09-06 PROCEDURE — 37000008 HC ANESTHESIA 1ST 15 MINUTES: Mod: TXP | Performed by: INTERNAL MEDICINE

## 2017-09-06 PROCEDURE — 88305 TISSUE EXAM BY PATHOLOGIST: CPT | Mod: TXP | Performed by: PATHOLOGY

## 2017-09-06 PROCEDURE — 63600175 PHARM REV CODE 636 W HCPCS: Mod: TXP | Performed by: NURSE ANESTHETIST, CERTIFIED REGISTERED

## 2017-09-06 PROCEDURE — D9220A PRA ANESTHESIA: Mod: CRNA,TXP,, | Performed by: NURSE ANESTHETIST, CERTIFIED REGISTERED

## 2017-09-06 PROCEDURE — 25000003 PHARM REV CODE 250: Mod: TXP | Performed by: INTERNAL MEDICINE

## 2017-09-06 PROCEDURE — 27201089 HC SNARE, DISP (ANY): Mod: TXP | Performed by: INTERNAL MEDICINE

## 2017-09-06 PROCEDURE — 45385 COLONOSCOPY W/LESION REMOVAL: CPT | Mod: TXP | Performed by: INTERNAL MEDICINE

## 2017-09-06 PROCEDURE — 37000009 HC ANESTHESIA EA ADD 15 MINS: Mod: TXP | Performed by: INTERNAL MEDICINE

## 2017-09-06 RX ORDER — LIDOCAINE HCL/PF 100 MG/5ML
SYRINGE (ML) INTRAVENOUS
Status: DISCONTINUED | OUTPATIENT
Start: 2017-09-06 | End: 2017-09-06

## 2017-09-06 RX ORDER — SODIUM CHLORIDE 9 MG/ML
INJECTION, SOLUTION INTRAVENOUS CONTINUOUS
Status: DISCONTINUED | OUTPATIENT
Start: 2017-09-06 | End: 2017-09-06 | Stop reason: HOSPADM

## 2017-09-06 RX ORDER — LORAZEPAM 1 MG/1
1 TABLET ORAL EVERY 6 HOURS PRN
COMMUNITY
End: 2017-09-12

## 2017-09-06 RX ORDER — DEXTROMETHORPHAN/PSEUDOEPHED 2.5-7.5/.8
DROPS ORAL
Status: DISCONTINUED
Start: 2017-09-06 | End: 2017-09-06 | Stop reason: HOSPADM

## 2017-09-06 RX ORDER — PROPOFOL 10 MG/ML
INJECTION, EMULSION INTRAVENOUS
Status: DISCONTINUED
Start: 2017-09-06 | End: 2017-09-06 | Stop reason: HOSPADM

## 2017-09-06 RX ORDER — PROPOFOL 10 MG/ML
VIAL (ML) INTRAVENOUS
Status: DISCONTINUED | OUTPATIENT
Start: 2017-09-06 | End: 2017-09-06

## 2017-09-06 RX ORDER — LIDOCAINE HYDROCHLORIDE 20 MG/ML
INJECTION, SOLUTION EPIDURAL; INFILTRATION; INTRACAUDAL; PERINEURAL
Status: DISCONTINUED
Start: 2017-09-06 | End: 2017-09-06 | Stop reason: HOSPADM

## 2017-09-06 RX ORDER — AMLODIPINE BESYLATE 10 MG/1
10 TABLET ORAL DAILY
COMMUNITY
End: 2018-04-12

## 2017-09-06 RX ADMIN — PROPOFOL 40 MG: 10 INJECTION, EMULSION INTRAVENOUS at 10:09

## 2017-09-06 RX ADMIN — SODIUM CHLORIDE: 0.9 INJECTION, SOLUTION INTRAVENOUS at 09:09

## 2017-09-06 RX ADMIN — PROPOFOL 80 MG: 10 INJECTION, EMULSION INTRAVENOUS at 09:09

## 2017-09-06 RX ADMIN — LIDOCAINE HYDROCHLORIDE 75 MG: 20 INJECTION, SOLUTION INTRAVENOUS at 09:09

## 2017-09-06 NOTE — OR NURSING
Have you had a colonoscopy LESS THAN 3 years ago?   * If YES, answer these questions*:   NO    1. Did patient have a prior colonic polyp in a previous surveillance/diagnostic colonoscopy and is 18 years or older on date of encounter?    2. Documentation of < 3 year interval since the patients last colonoscopy due to medical reasons (eg., last colonoscopy incomplete, last colonoscopy had inadequate prep, piecemeal removal of adenomas, or last colonoscopy found > 10 adenomas) ?        1st colonoscopy

## 2017-09-06 NOTE — ANESTHESIA POSTPROCEDURE EVALUATION
"Anesthesia Post Evaluation    Patient: Andra Newell    Procedure(s) Performed: Procedure(s) (LRB):  COLONOSCOPY (N/A)    Final Anesthesia Type: general  Patient location during evaluation: PACU  Patient participation: Yes- Able to Participate  Level of consciousness: awake and alert and oriented  Post-procedure vital signs: reviewed and stable  Pain management: adequate  Airway patency: patent  PONV status at discharge: No PONV  Anesthetic complications: no      Cardiovascular status: blood pressure returned to baseline  Respiratory status: unassisted, spontaneous ventilation and room air  Hydration status: euvolemic  Follow-up not needed.        Visit Vitals  BP (!) 114/56   Pulse (!) 56   Temp 37.1 °C (98.8 °F) (Temporal)   Resp (!) 22   Ht 5' 7" (1.702 m)   Wt 106.6 kg (235 lb)   LMP 02/28/2012   SpO2 96%   Breastfeeding? No   BMI 36.81 kg/m²       Pain/Asmita Score: Pain Assessment Performed: Yes (9/6/2017 10:31 AM)  Presence of Pain: denies (9/6/2017  9:27 AM)      "

## 2017-09-06 NOTE — H&P
"[facility]    CC: Screening colonsocopy    HPI 57 y.o. female presents for initial screening colonoscopy. She is undergoing evaluation for renal transplant. She has no blood in stool, changes in bowel habits or family history of colon cancer.    Past Medical History:   Diagnosis Date    Anemia     Arthritis     Asthma     allergic airway    Depression     Diabetes mellitus     Eosinophilia     Gout     Gout     Hyperlipidemia     Hypertension     Low back pain     MRSA carrier     Obesity     Renal disorder     Rotator cuff syndrome--left     Thyroid disease     Ulnar neuropathy of right upper extremity          Review of Systems  General ROS: negative for - chills, fever or weight loss  Cardiovascular ROS: no chest pain or dyspnea on exertion  Gastrointestinal ROS: no abdominal pain, no blood in stool, no changes in bowel movements    Physical Examination  BP (!) 172/84 (BP Location: Left arm, Patient Position: Lying)   Pulse 80   Temp 98.8 °F (37.1 °C) (Temporal)   Resp 18   Ht 5' 7" (1.702 m)   Wt 106.6 kg (235 lb)   LMP 02/28/2012   SpO2 96%   Breastfeeding? No   BMI 36.81 kg/m²   General appearance: alert, cooperative, no distress  HENT: Normocephalic, atraumatic, neck symmetrical, no nasal discharge, sclera anicteric   Lungs: clear to auscultation bilaterally, symmetric chest wall expansion bilaterally  Heart: regular rate and rhythm without rub; no displacement of the PMI   Abdomen: obese, soft, nontender, nondistended  Extremities: extremities symmetric; no clubbing, cyanosis, or edema        Labs:  Lab Results   Component Value Date    WBC 7.90 07/24/2017    HGB 11.2 (L) 07/24/2017    HCT 34.2 (L) 07/24/2017    MCV 85 07/24/2017     07/24/2017       Assessment:   57 y.o. female presents for screening colonoscopy prior to renal transplant.     Plan:  -Proceed with colonoscopy    Marisol Bryson MD  [facility]  [address]  [place] [address]  Office: [phone]  Fax: [phone]

## 2017-09-06 NOTE — TRANSFER OF CARE
"Anesthesia Transfer of Care Note    Patient: Andra Newell    Procedure(s) Performed: Procedure(s) (LRB):  COLONOSCOPY (N/A)    Patient location: PACU    Anesthesia Type: general    Transport from OR: Transported from OR on room air with adequate spontaneous ventilation    Post pain: adequate analgesia    Post assessment: no apparent anesthetic complications and tolerated procedure well    Post vital signs: stable    Level of consciousness: awake, alert and oriented    Nausea/Vomiting: no nausea/vomiting    Complications: none    Transfer of care protocol was followed      Last vitals:   Visit Vitals  BP (!) 172/84 (BP Location: Left arm, Patient Position: Lying)   Pulse 80   Temp 37.1 °C (98.8 °F) (Temporal)   Resp 18   Ht 5' 7" (1.702 m)   Wt 106.6 kg (235 lb)   LMP 02/28/2012   SpO2 96%   Breastfeeding? No   BMI 36.81 kg/m²     "

## 2017-09-06 NOTE — DISCHARGE INSTRUCTIONS
Colonoscopy     A camera attached to a flexible tube with a viewing lens is used to take video pictures.     Colonoscopy is a test to view the inside of your lower digestive tract (colon and rectum). Sometimes it can show the last part of the small intestine (ileum). During the test, small pieces of tissue may be removed for testing. This is called a biopsy. Small growths, such as polyps, may also be removed.   Why is colonoscopy done?  The test is done to help look for colon cancer. And it can help find the source of abdominal pain, bleeding, and changes in bowel habits. It may be needed once a year, depending on factors such as your:  · Age  · Health history  · Family health history  · Symptoms  · Results from any prior colonoscopy  Risks and possible complications  These include:  · Bleeding               · A puncture or tear in the colon   · Risks of anesthesia  · A cancer lesion not being seen  Getting ready   To prepare for the test:  · Talk with your healthcare provider about the risks of the test (see below). Also ask your healthcare provider about alternatives to the test.  · Tell your healthcare provider about any medicines you take. Also tell him or her about any health conditions you may have.  · Make sure your rectum and colon are empty for the test. Follow the diet and bowel prep instructions exactly. If you dont, the test may need to be rescheduled.  · Plan for a friend or family member to drive you home after the test.     Colonoscopy provides an inside view of the entire colon.     You may discuss the results with your doctor right away or at a future visit.  During the test   The test is usually done in the hospital on an outpatient basis. This means you go home the same day. The procedure takes about 30 minutes. During that time:  · You are given relaxing (sedating) medicine through an IV line. You may be drowsy, or fully asleep.  · The healthcare provider will first give you a physical exam to  check for anal and rectal problems.  · Then the anus is lubricated and the scope inserted.  · If you are awake, you may have a feeling similar to needing to have a bowel movement. You may also feel pressure as air is pumped into the colon. Its OK to pass gas during the procedure.  · Biopsy, polyp removal, or other treatments may be done during the test.  After the test   You may have gas right after the test. It can help to try to pass it to help prevent later bloating. Your healthcare provider may discuss the results with you right away. Or you may need to schedule a follow-up visit to talk about the results. After the test, you can go back to your normal eating and other activities. You may be tired from the sedation and need to rest for a few hours.  Date Last Reviewed: 11/1/2016 © 2000-2016 [employer]. [address] [place] [address]. All rights reserved. This information is not intended as a substitute for professional medical care. Always follow your healthcare professional's instructions.      Understanding Colon and Rectal Polyps    The colon (also called the large intestine) is a muscular tube that forms the last part of the digestive tract. It absorbs water and stores food waste. The colon is about 4 to 6 feet long. The rectum is the last 6 inches of the colon. The colon and rectum have a smooth lining composed of millions of cells. Changes in these cells can lead to growths in the colon that can become cancerous and should be removed. Multiple tests are available to screen for colon cancer, but the colonoscopy is the most recommended test. During colonoscopy, these polyps can be removed. How often you need this test depends on many things including your condition, your family history, symptoms, and what the findings were at the previous colonoscopy.   When the colon lining changes  Changes that happen in the cells that line the colon or rectum can lead to growths called polyps.  Over a period of years, polyps can turn cancerous. Removing polyps early may prevent cancer from ever forming.  Polyps  Polyps are fleshy clumps of tissue that form on the lining of the colon or rectum. Small polyps are usually benign (not cancerous). However, over time, cells in a polyp can change and become cancerous. Certain types of polyps known as adenomatous polyps are premalignant. The risk for invasive cancer increases with the size of the polyp and certain cell and gene features. This means that they can become cancerous if they're not removed. Hyperplastic polyps are benign. They can grow quite large and not turn cancerous.   Cancer  Almost all colorectal cancers start when polyp cells begin growing abnormally. As a cancerous tumor grows, it may involve more and more of the colon or rectum. In time, cancer can also grow beyond the colon or rectum and spread to nearby organs or to glands called lymph nodes. The cells can also travel to other parts of the body. This is known as metastasis. The earlier a cancerous tumor is removed, the better the chance of preventing its spread.    Date Last Reviewed: 8/1/2016  © 6763-3588 [employer]. [address] [place] [address]. All rights reserved. This information is not intended as a substitute for professional medical care. Always follow your healthcare professional's instructions.      Diverticulosis    Diverticulosis means that small pouches have formed in the wall of your large intestine (colon). Most often, this problem causes no symptoms and is common as people age. But the pouches in the colon are at risk of becoming infected. When this happens, the condition is called diverticulitis. Although most people with diverticulosis never develop diverticulitis, it is still not uncommon. Rectal bleeding can also occur and in less common situations, a type of colon inflammation called colitis.  While most people do not have symptoms, some people  with diverticulosis may have:  · Abdominal cramps and pain  · Bloating  · Constipation  · Change in bowel habits  Causes  The exact cause of diverticulosis (and diverticulitis) has not been proved, but a few things are associated with the condition:  · Low-fiber diet  · Constipation  · Lack of exercise  Your healthcare provider will talk with you about how to manage your condition. Diet changes may be all that are needed to help control diverticulosis and prevent progression to diverticulitis. If you develop diverticulitis, you will likely need other treatments.  Home care  You may be told to take fiber supplements daily. Fiber adds bulk to the stool so that it passes through the colon more easily. Stool softeners may be recommended. You may also be given medications for pain relief. Be sure to take all medications as directed.  In the past, people were told to avoid corn, nuts, and seeds. This is no longer necessary.  Follow these guidelines when caring for yourself at home:  · Eat unprocessed foods that are high in fiber. Whole grains, fruits, and vegetables are good choices.  · Drink 6 to 8 glasses of water every day unless your healthcare provider has you limit how much fluid you should have.  · Watch for changes in your bowel movements. Tell your provider if you notice any changes.  · Begin an exercise program. Ask your provider how to get started. Generally, walking is the best.  · Get plenty of rest and sleep.  Follow-up care  Follow up with your healthcare provider, or as advised. Regular visits may be needed to check on your health. Sometimes special procedures such as colonoscopy, are needed after an episode of diverticulitis or blooding. Be sure to keep all your appointments.  If a stool sample was taken, or cultures were done, you should be told if they are positive, or if your treatment needs to be changed. You can call as directed for the results.  If X-rays were done, a radiologist will look at them.  You will be told if there is a change in your treatment.  If antibiotics were prescribed, be sure to finish them all.  When to seek medical advice  Call your healthcare provider right away if any of these occur:  · Fever of 100.4°F (38°C) or higher, or as directed by your healthcare provider  · Severe cramps in the lower left side of the abdomen or pain that is getting worse  · Tenderness in the lower left side of the abdomen or worsening pain throughout the abdomen  · Diarrhea or constipation that doesn't get better within 24 hours  · Nausea and vomiting  · Bleeding from the rectum  Call 911  Call emergency services if any of the following occur:  · Trouble breathing  · Confusion  · Very drowsy or trouble awakening  · Fainting or loss of consciousness  · Rapid heart rate  · Chest pain  Date Last Reviewed: 12/30/2015  © 6694-2796 [employer]. [address] [place] [address], [place] [address]. All rights reserved. This information is not intended as a substitute for professional medical care. Always follow your healthcare professional's instructions.

## 2017-09-06 NOTE — ANESTHESIA PREPROCEDURE EVALUATION
09/06/2017  Andra Newell is a 57 y.o., female.    Anesthesia Evaluation    I have reviewed the Patient Summary Reports.    I have reviewed the Nursing Notes.   I have reviewed the Medications.     Review of Systems  Anesthesia Hx:  No problems with previous Anesthesia    Social:  Non-Smoker    Cardiovascular:   Hypertension    Pulmonary:   Asthma asymptomatic    Renal/:   Chronic Renal Disease    Musculoskeletal:   Arthritis     Neurological:   Neuromuscular Disease,    Endocrine:   Diabetes, poorly controlled, type 2 Hypothyroidism    Psych:   Psychiatric History          Physical Exam  General:  Obesity    Airway/Jaw/Neck:  Airway Findings: Mouth Opening: Normal General Airway Assessment: Adult, Average  Mallampati: II  TM Distance: Normal, at least 6 cm      Dental:  Dental Findings: In tact   Chest/Lungs:  Chest/Lungs Findings: Clear to auscultation, Normal Respiratory Rate     Heart/Vascular:  Heart Findings: Rate: Normal  Rhythm: Regular Rhythm  Sounds: Normal        Mental Status:  Mental Status Findings:  Alert and Oriented, Cooperative         Anesthesia Plan  Type of Anesthesia, risks & benefits discussed:  Anesthesia Type:  general  Patient's Preference:   Intra-op Monitoring Plan:   Intra-op Monitoring Plan Comments:   Post Op Pain Control Plan:   Post Op Pain Control Plan Comments:   Induction:   IV  Beta Blocker:  Patient is not currently on a Beta-Blocker (No further documentation required).       Informed Consent: Patient understands risks and agrees with Anesthesia plan.  Questions answered. Anesthesia consent signed with patient.  ASA Score: 3     Day of Surgery Review of History & Physical: I have interviewed and examined the patient. I have reviewed the patient's H&P dated:  There are no significant changes.          Ready For Surgery From Anesthesia Perspective.

## 2017-09-12 ENCOUNTER — OFFICE VISIT (OUTPATIENT)
Dept: ENDOCRINOLOGY | Facility: CLINIC | Age: 57
End: 2017-09-12
Payer: MEDICAID

## 2017-09-12 VITALS
HEIGHT: 67 IN | BODY MASS INDEX: 37.27 KG/M2 | HEART RATE: 82 BPM | WEIGHT: 237.44 LBS | RESPIRATION RATE: 18 BRPM | SYSTOLIC BLOOD PRESSURE: 158 MMHG | DIASTOLIC BLOOD PRESSURE: 82 MMHG

## 2017-09-12 DIAGNOSIS — E11.69 TYPE 2 DIABETES MELLITUS WITH OTHER SPECIFIED COMPLICATION: Primary | ICD-10-CM

## 2017-09-12 PROCEDURE — 3077F SYST BP >= 140 MM HG: CPT | Mod: NTX,,, | Performed by: INTERNAL MEDICINE

## 2017-09-12 PROCEDURE — 3045F PR MOST RECENT HEMOGLOBIN A1C LEVEL 7.0-9.0%: CPT | Mod: NTX,,, | Performed by: INTERNAL MEDICINE

## 2017-09-12 PROCEDURE — 3079F DIAST BP 80-89 MM HG: CPT | Mod: NTX,,, | Performed by: INTERNAL MEDICINE

## 2017-09-12 PROCEDURE — 99999 PR PBB SHADOW E&M-EST. PATIENT-LVL III: CPT | Mod: PBBFAC,TXP,, | Performed by: INTERNAL MEDICINE

## 2017-09-12 PROCEDURE — 99214 OFFICE O/P EST MOD 30 MIN: CPT | Mod: S$PBB,NTX,, | Performed by: INTERNAL MEDICINE

## 2017-09-12 PROCEDURE — 3008F BODY MASS INDEX DOCD: CPT | Mod: NTX,,, | Performed by: INTERNAL MEDICINE

## 2017-09-12 PROCEDURE — 99213 OFFICE O/P EST LOW 20 MIN: CPT | Mod: PBBFAC,NTX | Performed by: INTERNAL MEDICINE

## 2017-09-12 PROCEDURE — 4010F ACE/ARB THERAPY RXD/TAKEN: CPT | Mod: NTX,,, | Performed by: INTERNAL MEDICINE

## 2017-09-12 RX ORDER — LORAZEPAM 1 MG/1
1 TABLET ORAL 2 TIMES DAILY
COMMUNITY
End: 2018-07-02 | Stop reason: SDUPTHER

## 2017-09-12 NOTE — PROGRESS NOTES
Ms. Newell is a 57 y.o. F with history of hypothyroidism, Dm2, Hl, HTN, obesity, kidney dysfunction on transplant list here for initial visit for endocrine evaluation for kidney transplant.    Patient is a kidney transplant evaluation and needs a endocrine evaluation. Pt is also having fistula creation for dialysis for the future.     Has had hypothyroidism in mid 50s, was discovered incidentally on labs.  She is on levothyroxine 75mcg daily (decreased from 100mcg 6 months ago) - TSH in July 2017 was 0.285, which was improved from a more suppressed TSH of 0.024 in March 2017.      Has had DM for several years, discovered incidentally on labs, used to be on orals on insulin. Now on basaglar 80units BID - fasting FS 80s to 90s, does not check FS later in the day but when she does, sometimes is mid 100s. Also on atorvastatin 20mg daily and irbesartan. No numbness in feet.  Last optho visit 1 year ago, unsure if there was retinopathy detected.     July 2017 labs further show A1c is 7.8, LDL 61.4, , total chol 137, GFR in the 10s. VitD also low, being followed by nephrology.     Patient is overweight but says weight gain has been gradual over the years, but states recently has lost some in recent years due to improvements in diet.     FS here 3hrs post meal 171.       Component      Latest Ref Rng & Units 8/29/2017 7/26/2017 7/24/2017 7/12/2017   Hemoglobin A1C      4.0 - 5.6 %    7.8 (H)     Component      Latest Ref Rng & Units 4/26/2017 4/12/2017 3/27/2017 12/22/2016   Hemoglobin A1C      4.0 - 5.6 %  8.9 (H) 8.9 (H)      Component      Latest Ref Rng & Units 8/29/2017 7/26/2017 7/24/2017 7/12/2017   TSH      0.400 - 4.000 uIU/mL  0.285 (L)     Free T4      0.71 - 1.51 ng/dL  0.87     Vit D, 25-Hydroxy      30 - 96 ng/mL 18 (L)      T4 Total      4.5 - 11.5 ug/dL  5.1     T3, Total      60 - 180 ng/dL  103       Component      Latest Ref Rng & Units 4/26/2017 4/12/2017 3/27/2017   TSH      0.400 - 4.000  uIU/mL   0.024 (L)   Free T4      0.71 - 1.51 ng/dL   1.10   Vit D, 25-Hydroxy      30 - 96 ng/mL 12 (L)     T4 Total      4.5 - 11.5 ug/dL      T3, Total      60 - 180 ng/dL        Past Medical History:   Diagnosis Date    Anemia     Arthritis     Asthma     allergic airway    Depression     Diabetes mellitus     Eosinophilia     Gout     Gout     Hyperlipidemia     Hypertension     Low back pain     MRSA carrier     Obesity     Renal disorder     Rotator cuff syndrome--left     Thyroid disease     Ulnar neuropathy of right upper extremity         reports that she has never smoked. She has never used smokeless tobacco. She reports that she does not drink alcohol or use drugs.    Family History   Problem Relation Age of Onset    Diabetes Mother     Alzheimer's disease Mother     Thyroid disease Mother     Hyperlipidemia Mother     Heart disease Father     Stroke Father     Diabetes Sister     Lupus Sister     Ovarian cancer Sister     Heart disease Brother        Past Surgical History:   Procedure Laterality Date    ACHILLES TENDON SURGERY Left May 2015    BACK SURGERY      CHOLECYSTECTOMY      COLONOSCOPY N/A 9/6/2017    Procedure: COLONOSCOPY;  Surgeon: Marisol Bryson MD;  Location: [facility];  Service: Endoscopy;  Laterality: N/A;    HEMORRHOID SURGERY      JOINT REPLACEMENT      left    KNEE SURGERY      SHOULDER ARTHROSCOPY      SHOULDER SURGERY         Patient's Medications   New Prescriptions    No medications on file   Previous Medications    AMLODIPINE (NORVASC) 10 MG TABLET    Take 10 mg by mouth once daily.    ATORVASTATIN (LIPITOR) 20 MG TABLET    Take 20 mg by mouth once daily.    FAMOTIDINE (PEPCID) 20 MG TABLET    Take 20 mg by mouth once daily at 6am.    FUROSEMIDE (LASIX) 40 MG TABLET    Take 40 mg by mouth once daily.    INSULIN GLARGINE (BASAGLAR KWIKPEN) 100 UNIT/ML (3 ML) INPN PEN    Inject into the skin. 80 units SQ QAM, 70 units SQ QPM    IRBESARTAN  "(AVAPRO) 150 MG TABLET    Take 150 mg by mouth every evening.    LEVOTHYROXINE (SYNTHROID) 75 MCG TABLET    Take 75 mcg by mouth once daily.    LORATADINE (CLARITIN) 10 MG TABLET    Take 10 mg by mouth once daily.    LORAZEPAM (ATIVAN) 1 MG TABLET    Take 1 mg by mouth every 6 (six) hours as needed for Anxiety.    SODIUM BICARBONATE 650 MG TABLET    Take 650 mg by mouth 3 (three) times daily.     VILAZODONE (VIIBRYD) 40 MG TAB TABLET    Take 40 mg by mouth once daily.   Modified Medications    No medications on file   Discontinued Medications    No medications on file         Review of Systems:  General: no fever or chills or malaise   Eyes: no vision changes, no eye pain   ENT: no sore throat, no runny nose   Lung: no sob, no cough   CV: no cp or palpitations   GI: no nausea or vomiting or diarrhea   : no dysuria or hematuria   Endo: no heat or cold intolerance   Heme: no easy bruising or bleeding   MSK: no joint swelling or deformities   Neuro: no confusion or balance problems        BP (!) 158/82   Pulse 82   Resp 18   Ht 5' 7" (1.702 m)   Wt 107.7 kg (237 lb 7 oz)   LMP 02/28/2012   BMI 37.19 kg/m²     Physical Exam:  General: obese body habitus, not in acute distress   Eyes: anicteric, no proptosis   ENT: no facial lesions, no oral lesions   Neck: no masses, no LAD, no buffalo humps  Lung; ctabl, no wheezing or stridor   GI: normal bowel sounds, nondistended   CV: RRR, no rubs or murmurs   Back: healed midline scar lower back  Skin: exposed skin without bruising or bleeding, no purple striae  Ext: no peripheral edema or erythema, feet without lesions, monofilament normal bilaterally  Neuro: AOx3, moving all extremities, normal gait   Psych: normal affect, appropriate in conversation    Labs:    Chemistry        Component Value Date/Time     08/29/2017 0828    K 4.6 08/29/2017 0828     08/29/2017 0828    CO2 26 08/29/2017 0828    BUN 46 (H) 08/29/2017 0828    CREATININE 2.7 (H) 08/29/2017 " 0828    GLU 99 08/29/2017 0828        Component Value Date/Time    CALCIUM 9.8 08/29/2017 0828    ALKPHOS 212 (H) 07/24/2017 0852    AST 18 07/24/2017 0852    ALT 21 07/24/2017 0852    BILITOT 0.4 07/24/2017 0852    ESTGFRAFRICA 21 (A) 08/29/2017 0828    EGFRNONAA 19 (A) 08/29/2017 0828          Lab Results   Component Value Date    WBC 7.90 07/24/2017    HGB 11.2 (L) 07/24/2017    HCT 34.2 (L) 07/24/2017    MCV 85 07/24/2017     07/24/2017        Lab Results   Component Value Date    HDL 25 (L) 07/12/2017     Lab Results   Component Value Date    LDLCALC 61.4 (L) 07/12/2017     Lab Results   Component Value Date    TRIG 253 (H) 07/12/2017     Lab Results   Component Value Date    CHOLHDL 18.2 (L) 07/12/2017       Hemoglobin A1C   Date Value Ref Range Status   07/12/2017 7.8 (H) 4.0 - 5.6 % Final     Comment:     According to ADA guidelines, hemoglobin A1c <7.0% represents  optimal control in non-pregnant diabetic patients. Different  metrics may apply to specific patient populations.   Standards of Medical Care in Diabetes-2016.  For the purpose of screening for the presence of diabetes:  <5.7%     Consistent with the absence of diabetes  5.7-6.4%  Consistent with increasing risk for diabetes   (prediabetes)  >or=6.5%  Consistent with diabetes  Currently, no consensus exists for use of hemoglobin A1c  for diagnosis of diabetes for children.  This Hemoglobin A1c assay has significant interference with fetal   hemoglobin   (HbF). The results are invalid for patients with abnormal amounts of   HbF,   including those with known Hereditary Persistence   of Fetal Hemoglobin. Heterozygous hemoglobin variants (HbAS, HbAC,   HbAD, HbAE, HbA2) do not significantly interfere with this assay;   however, presence of multiple variants in a sample may impact the %   interference.     04/12/2017 8.9 (H) 4.5 - 6.2 % Final     Comment:     According to ADA guidelines, hemoglobin A1C <7.0% represents  optimal control in  non-pregnant diabetic patients.  Different  metrics may apply to specific populations.   Standards of Medical Care in Diabetes - 2016.  For the purpose of screening for the presence of diabetes:  <5.7%     Consistent with the absence of diabetes  5.7-6.4%  Consistent with increasing risk for diabetes   (prediabetes)  >or=6.5%  Consistent with diabetes  Currently no consensus exists for use of hemoglobin A1C  for diagnosis of diabetes for children.     03/27/2017 8.9 (H) 4.5 - 6.2 % Final     Comment:     According to ADA guidelines, hemoglobin A1C <7.0% represents  optimal control in non-pregnant diabetic patients.  Different  metrics may apply to specific populations.   Standards of Medical Care in Diabetes - 2016.  For the purpose of screening for the presence of diabetes:  <5.7%     Consistent with the absence of diabetes  5.7-6.4%  Consistent with increasing risk for diabetes   (prediabetes)  >or=6.5%  Consistent with diabetes  Currently no consensus exists for use of hemoglobin A1C  for diagnosis of diabetes for children.         Lab Results   Component Value Date    TSH 0.285 (L) 07/26/2017    M8TFMBI 103 07/26/2017    G7GSIIL 5.1 07/26/2017         Assessment and Plan:  Ms. Newell history of hypothyroidism, Dm2, Hl, HTN, obesity, kidney dysfunction on transplant list here for DM and hypothyroidism.    1. Dm2: A1c 7.8 although not very accurate in CKD, from pt's relaying of fasting FS and her post meal FS here of 171, control is likely fair.   Would recommend pt check FS twice daily to better assess glycemic control.  Repeat A1c.  Although a basal-only regimen is not ideal, pt prefers a more simplified regimen with minimization of needle sticks - alternatively in the future if she is having postprandial highs can consider mixed insulin.      2. HL: LDL acceptable, continue atorvastatin 20mg daily    3. Hypothyroidism: TFTs today, will let pt know if levothyroxine dose needs to be adjusted    Active through  [facility]    RTC in 2.5 months twyla Mcdaniels M.D.  Endocrinology Attending

## 2017-09-13 ENCOUNTER — PATIENT MESSAGE (OUTPATIENT)
Dept: ENDOCRINOLOGY | Facility: CLINIC | Age: 57
End: 2017-09-13

## 2017-09-13 ENCOUNTER — PATIENT MESSAGE (OUTPATIENT)
Dept: GASTROENTEROLOGY | Facility: CLINIC | Age: 57
End: 2017-09-13

## 2017-09-15 ENCOUNTER — TELEPHONE (OUTPATIENT)
Dept: TRANSPLANT | Facility: CLINIC | Age: 57
End: 2017-09-15

## 2017-09-15 NOTE — TELEPHONE ENCOUNTER
----- Message from Rip Mcdaniels MD sent at 9/14/2017  4:58 PM CDT -----  Hi Dr. Longo - she is taking a dose that is mildly too high.  I asked her to decrease her levothyroxine dose from 75mcg daily to 6 days per week (miss 1 dose per week intentionally).  Her levels are expected to be normal within the next 1-2 months.  This should not hold up her transplant.     Best,  Rip Mcdaniels MD  Cell     ----- Message -----  From: Roopa Longo  Sent: 9/14/2017  12:51 PM  To: MD Dr. Arslan Rodriguez,    Ms. Newell is in evaluation for kidney transplant. She will be presented to the Kidney Selection Committee on 9/22/17. Just wanted to confirm your plan for her low TSH. Please advise so I can inform the Transplant Team. Thank you!

## 2017-09-19 ENCOUNTER — TELEPHONE (OUTPATIENT)
Dept: CARDIOLOGY | Facility: CLINIC | Age: 57
End: 2017-09-19

## 2017-09-27 ENCOUNTER — TELEPHONE (OUTPATIENT)
Dept: RADIOLOGY | Facility: CLINIC | Age: 57
End: 2017-09-27

## 2017-10-04 ENCOUNTER — LAB VISIT (OUTPATIENT)
Dept: LAB | Facility: HOSPITAL | Age: 57
End: 2017-10-04
Attending: INTERNAL MEDICINE
Payer: MEDICAID

## 2017-10-04 DIAGNOSIS — E03.9 MYXEDEMA HEART DISEASE: ICD-10-CM

## 2017-10-04 DIAGNOSIS — F41.9 ANXIETY HYPERVENTILATION: ICD-10-CM

## 2017-10-04 DIAGNOSIS — N18.4 CHRONIC KIDNEY DISEASE, STAGE IV (SEVERE): ICD-10-CM

## 2017-10-04 DIAGNOSIS — D72.10 EOSINOPHILIA: ICD-10-CM

## 2017-10-04 DIAGNOSIS — E78.5 HYPERLIPEMIA: ICD-10-CM

## 2017-10-04 DIAGNOSIS — D63.1 ANEMIA OF CHRONIC RENAL FAILURE: ICD-10-CM

## 2017-10-04 DIAGNOSIS — F45.8 ANXIETY HYPERVENTILATION: ICD-10-CM

## 2017-10-04 DIAGNOSIS — I51.9 MYXEDEMA HEART DISEASE: ICD-10-CM

## 2017-10-04 DIAGNOSIS — N18.9 ANEMIA OF CHRONIC RENAL FAILURE: ICD-10-CM

## 2017-10-04 DIAGNOSIS — N10 ACUTE PYELONEPHRITIS WITHOUT LESION OF RENAL MEDULLARY NECROSIS: Primary | ICD-10-CM

## 2017-10-04 DIAGNOSIS — F33.40 RECURRENT MAJOR DEPRESSION IN REMISSION: ICD-10-CM

## 2017-10-04 LAB
ALBUMIN SERPL BCP-MCNC: 3.3 G/DL
ANION GAP SERPL CALC-SCNC: 9 MMOL/L
BACTERIA #/AREA URNS HPF: ABNORMAL /HPF
BASOPHILS # BLD AUTO: 0 K/UL
BASOPHILS NFR BLD: 0.3 %
BILIRUB UR QL STRIP: NEGATIVE
BUN SERPL-MCNC: 55 MG/DL
CALCIUM SERPL-MCNC: 9.7 MG/DL
CHLORIDE SERPL-SCNC: 110 MMOL/L
CLARITY UR: CLEAR
CO2 SERPL-SCNC: 26 MMOL/L
COLOR UR: YELLOW
CREAT SERPL-MCNC: 2.8 MG/DL
CREAT UR-MCNC: 83.2 MG/DL
DIFFERENTIAL METHOD: ABNORMAL
EOSINOPHIL # BLD AUTO: 0.5 K/UL
EOSINOPHIL NFR BLD: 6 %
ERYTHROCYTE [DISTWIDTH] IN BLOOD BY AUTOMATED COUNT: 15.9 %
EST. GFR  (AFRICAN AMERICAN): 21 ML/MIN/1.73 M^2
EST. GFR  (NON AFRICAN AMERICAN): 18 ML/MIN/1.73 M^2
GLUCOSE SERPL-MCNC: 147 MG/DL
GLUCOSE UR QL STRIP: NEGATIVE
HCT VFR BLD AUTO: 33.2 %
HGB BLD-MCNC: 11 G/DL
HGB UR QL STRIP: ABNORMAL
HYALINE CASTS #/AREA URNS LPF: 0 /LPF
KETONES UR QL STRIP: NEGATIVE
LEUKOCYTE ESTERASE UR QL STRIP: ABNORMAL
LYMPHOCYTES # BLD AUTO: 2.1 K/UL
LYMPHOCYTES NFR BLD: 22.5 %
MCH RBC QN AUTO: 28.4 PG
MCHC RBC AUTO-ENTMCNC: 33.1 G/DL
MCV RBC AUTO: 86 FL
MICROSCOPIC COMMENT: ABNORMAL
MONOCYTES # BLD AUTO: 0.6 K/UL
MONOCYTES NFR BLD: 6.8 %
NEUTROPHILS # BLD AUTO: 5.9 K/UL
NEUTROPHILS NFR BLD: 64.4 %
NITRITE UR QL STRIP: NEGATIVE
PH UR STRIP: 6 [PH] (ref 5–8)
PHOSPHATE SERPL-MCNC: 4 MG/DL
PLATELET # BLD AUTO: 222 K/UL
PMV BLD AUTO: 7.8 FL
POTASSIUM SERPL-SCNC: 5.3 MMOL/L
PROT UR QL STRIP: ABNORMAL
PROT UR-MCNC: 154 MG/DL
PROT/CREAT RATIO, UR: 1.85
PTH-INTACT SERPL-MCNC: 121.7 PG/ML
RBC # BLD AUTO: 3.88 M/UL
RBC #/AREA URNS HPF: 1 /HPF (ref 0–4)
SODIUM SERPL-SCNC: 145 MMOL/L
SP GR UR STRIP: 1.01 (ref 1–1.03)
SQUAMOUS #/AREA URNS HPF: 1 /HPF
URN SPEC COLLECT METH UR: ABNORMAL
UROBILINOGEN UR STRIP-ACNC: NEGATIVE EU/DL
WBC # BLD AUTO: 9.2 K/UL
WBC #/AREA URNS HPF: 7 /HPF (ref 0–5)

## 2017-10-04 PROCEDURE — 36415 COLL VENOUS BLD VENIPUNCTURE: CPT | Mod: TXP

## 2017-10-04 PROCEDURE — 84156 ASSAY OF PROTEIN URINE: CPT | Mod: TXP

## 2017-10-04 PROCEDURE — 81000 URINALYSIS NONAUTO W/SCOPE: CPT | Mod: TXP

## 2017-10-04 PROCEDURE — 80069 RENAL FUNCTION PANEL: CPT | Mod: TXP

## 2017-10-04 PROCEDURE — 85025 COMPLETE CBC W/AUTO DIFF WBC: CPT | Mod: TXP

## 2017-10-04 PROCEDURE — 83970 ASSAY OF PARATHORMONE: CPT | Mod: TXP

## 2017-10-18 ENCOUNTER — TELEPHONE (OUTPATIENT)
Dept: TRANSPLANT | Facility: CLINIC | Age: 57
End: 2017-10-18

## 2017-10-18 NOTE — TELEPHONE ENCOUNTER
"According to nephrologist medical records, in the last three months. Pt has been highly compliant with appointments and labs. :"She does everything we ask and more. She communicates with us and is preparing to have fistula placed".       Confirmed by Nephrology-JOHNNIE Dasilva             "

## 2017-10-20 ENCOUNTER — COMMITTEE REVIEW (OUTPATIENT)
Dept: TRANSPLANT | Facility: CLINIC | Age: 57
End: 2017-10-20

## 2017-10-20 DIAGNOSIS — Z76.82 ORGAN TRANSPLANT CANDIDATE: Primary | ICD-10-CM

## 2017-10-20 DIAGNOSIS — Z01.818 PRE-OP EXAMINATION: Primary | ICD-10-CM

## 2017-10-20 NOTE — COMMITTEE REVIEW
Native Organ Dx: Diabetes Mellitus - Type II      SELECTION COMMITTEE NOTE    Andranicolette Newell was presented at selection committee on 10/20/2017.  Patient met selection criteria for kidney transplant related to CKD, Stage 5 due to   Diabetes Mellitus - Type II.  No absolute contraindications to transplant at this time.  Patient will be placed on the cadaveric wait list pending surgical visit and final financial approval from insurance company.  Patient will return to clinic for routine appointment in 1 year. Patient does not meet criteria for High KDPI kidney offer due to weight.        Note written by Roopa Longo RN    ===============================================  I was present at the meeting and attest to the decision of the committee

## 2017-10-20 NOTE — LETTER
October 20, 2017    Dalton Heaton  [address] [facility]  [place] [address]  Phone: [phone]  Fax: [phone]             Dear Dr. Dalton Heaton    Patient: Andra Newell   MR Number: [id number]   YOB: 1960     Your patient, Andra Newell, was recently discussed at the [facility] Selection Committee.  I am happy to inform you that Andra has been approved for transplantation pending reassessment by transplant surgeon in the clinic.  She has met selection criteria for a kidney transplant related to CKD stage 5 secondary to primary diagnosis of Diabetes Mellitus - Type II. Your patient will be placed on the cadaveric wait list pending final financial approval from insurance company.     We appreciate your confidence in allowing us to participate in your patients care.      If you have any questions or concerns, please do not hesitate to contact me.    Sincerely,      Maureen Millard MD  Medical Director, Kidney & Kidney/Pancreas Transplantation

## 2017-10-21 ENCOUNTER — HOSPITAL ENCOUNTER (EMERGENCY)
Facility: HOSPITAL | Age: 57
Discharge: HOME | End: 2017-10-21
Attending: EMERGENCY MEDICINE
Payer: MEDICAID

## 2017-10-21 VITALS
DIASTOLIC BLOOD PRESSURE: 69 MMHG | RESPIRATION RATE: 20 BRPM | TEMPERATURE: 97 F | BODY MASS INDEX: 36.68 KG/M2 | SYSTOLIC BLOOD PRESSURE: 144 MMHG | HEART RATE: 70 BPM | WEIGHT: 233.69 LBS | OXYGEN SATURATION: 97 % | HEIGHT: 67 IN

## 2017-10-21 DIAGNOSIS — E86.0 DEHYDRATION: Primary | ICD-10-CM

## 2017-10-21 DIAGNOSIS — N18.4 STAGE 4 CHRONIC KIDNEY DISEASE: ICD-10-CM

## 2017-10-21 LAB
ALBUMIN SERPL BCP-MCNC: 3.6 G/DL
ALP SERPL-CCNC: 194 U/L
ALT SERPL W/O P-5'-P-CCNC: 21 U/L
ANION GAP SERPL CALC-SCNC: 12 MMOL/L
AST SERPL-CCNC: 20 U/L
BACTERIA #/AREA URNS HPF: ABNORMAL /HPF
BASOPHILS # BLD AUTO: 0 K/UL
BASOPHILS NFR BLD: 0.2 %
BILIRUB SERPL-MCNC: 0.5 MG/DL
BILIRUB UR QL STRIP: NEGATIVE
BUN SERPL-MCNC: 58 MG/DL
CALCIUM SERPL-MCNC: 9.6 MG/DL
CHLORIDE SERPL-SCNC: 104 MMOL/L
CLARITY UR: CLEAR
CO2 SERPL-SCNC: 25 MMOL/L
COLOR UR: YELLOW
CREAT SERPL-MCNC: 3.3 MG/DL
DIFFERENTIAL METHOD: ABNORMAL
EOSINOPHIL # BLD AUTO: 0.4 K/UL
EOSINOPHIL NFR BLD: 3.5 %
ERYTHROCYTE [DISTWIDTH] IN BLOOD BY AUTOMATED COUNT: 15.8 %
EST. GFR  (AFRICAN AMERICAN): 17 ML/MIN/1.73 M^2
EST. GFR  (NON AFRICAN AMERICAN): 15 ML/MIN/1.73 M^2
GLUCOSE SERPL-MCNC: 133 MG/DL
GLUCOSE UR QL STRIP: NEGATIVE
HCT VFR BLD AUTO: 35.4 %
HGB BLD-MCNC: 11.7 G/DL
HGB UR QL STRIP: ABNORMAL
HYALINE CASTS #/AREA URNS LPF: 0 /LPF
KETONES UR QL STRIP: NEGATIVE
LEUKOCYTE ESTERASE UR QL STRIP: ABNORMAL
LYMPHOCYTES # BLD AUTO: 2.6 K/UL
LYMPHOCYTES NFR BLD: 20.8 %
MCH RBC QN AUTO: 27.9 PG
MCHC RBC AUTO-ENTMCNC: 33 G/DL
MCV RBC AUTO: 85 FL
MICROSCOPIC COMMENT: ABNORMAL
MONOCYTES # BLD AUTO: 0.8 K/UL
MONOCYTES NFR BLD: 6.7 %
NEUTROPHILS # BLD AUTO: 8.7 K/UL
NEUTROPHILS NFR BLD: 68.8 %
NITRITE UR QL STRIP: NEGATIVE
PH UR STRIP: 6 [PH] (ref 5–8)
PLATELET # BLD AUTO: 247 K/UL
PMV BLD AUTO: 7 FL
POTASSIUM SERPL-SCNC: 4 MMOL/L
PROT SERPL-MCNC: 7.8 G/DL
PROT UR QL STRIP: ABNORMAL
RBC # BLD AUTO: 4.19 M/UL
RBC #/AREA URNS HPF: 6 /HPF (ref 0–4)
SODIUM SERPL-SCNC: 141 MMOL/L
SP GR UR STRIP: 1.01 (ref 1–1.03)
SQUAMOUS #/AREA URNS HPF: 10 /HPF
TROPONIN I SERPL DL<=0.01 NG/ML-MCNC: 0.01 NG/ML
TSH SERPL DL<=0.005 MIU/L-ACNC: 0.75 UIU/ML
URN SPEC COLLECT METH UR: ABNORMAL
UROBILINOGEN UR STRIP-ACNC: NEGATIVE EU/DL
WBC # BLD AUTO: 12.7 K/UL
WBC #/AREA URNS HPF: 2 /HPF (ref 0–5)

## 2017-10-21 PROCEDURE — 81000 URINALYSIS NONAUTO W/SCOPE: CPT | Mod: NTX

## 2017-10-21 PROCEDURE — 80053 COMPREHEN METABOLIC PANEL: CPT | Mod: NTX

## 2017-10-21 PROCEDURE — 85025 COMPLETE CBC W/AUTO DIFF WBC: CPT | Mod: NTX

## 2017-10-21 PROCEDURE — 96361 HYDRATE IV INFUSION ADD-ON: CPT | Mod: NTX

## 2017-10-21 PROCEDURE — 93005 ELECTROCARDIOGRAM TRACING: CPT | Mod: NTX

## 2017-10-21 PROCEDURE — 36415 COLL VENOUS BLD VENIPUNCTURE: CPT | Mod: NTX

## 2017-10-21 PROCEDURE — 96360 HYDRATION IV INFUSION INIT: CPT | Mod: NTX

## 2017-10-21 PROCEDURE — 84443 ASSAY THYROID STIM HORMONE: CPT | Mod: NTX

## 2017-10-21 PROCEDURE — 99284 EMERGENCY DEPT VISIT MOD MDM: CPT | Mod: 25,NTX

## 2017-10-21 PROCEDURE — 84484 ASSAY OF TROPONIN QUANT: CPT | Mod: NTX

## 2017-10-21 PROCEDURE — 25000003 PHARM REV CODE 250: Mod: NTX | Performed by: EMERGENCY MEDICINE

## 2017-10-21 RX ORDER — ONDANSETRON 4 MG/1
4 TABLET, ORALLY DISINTEGRATING ORAL
Status: COMPLETED | OUTPATIENT
Start: 2017-10-21 | End: 2017-10-21

## 2017-10-21 RX ORDER — CALCITRIOL 0.5 UG/1
0.5 CAPSULE ORAL DAILY
Status: ON HOLD | COMMUNITY
End: 2018-04-04 | Stop reason: HOSPADM

## 2017-10-21 RX ORDER — GLIMEPIRIDE 2 MG/1
2 TABLET ORAL
COMMUNITY
End: 2018-02-05

## 2017-10-21 RX ADMIN — SODIUM CHLORIDE 500 ML: 900 INJECTION, SOLUTION INTRAVENOUS at 06:10

## 2017-10-21 RX ADMIN — ONDANSETRON 4 MG: 4 TABLET, ORALLY DISINTEGRATING ORAL at 07:10

## 2017-10-21 NOTE — ED PROVIDER NOTES
Encounter Date: 10/21/2017    SCRIBE #1 NOTE: I, Liss Rushing, am scribing for, and in the presence of, Dr. Lopez.       History     Chief Complaint   Patient presents with    Chest Pain     Today with sob.    Fatigue     leg cramping.       10/21/2017 5:03 PM     Chief complaint: Chest heaviness      Andra Newell is a 57 y.o. female who presents to the ED with Weakness, nausea, HA, cramps in LE for the past 2 days.  She's also had some mild chest discomfort that is epigastric w/ no sob, cough, hemoptysis . Pt reports she has been feeling unwell for a few days, but is concerned about the chest heaviness. PT endorses chronic pain over her kidneys and SOB with exertion. Dr. Dalton Heaton is her Nephrologist. Her last AcuChek reading was 106 and relatively low; her normal range is 190. Denies vomiting, diarrhea, black or bloody stools, dysuria, fever, rhinorrhea, sore throat, abd pain, cough. PMHx includes pyelonephritis, HTN, HLD, diabetes mellitus, renal disorder, asthma, arthritis, gout, depression, thyroid disease. Surgical history includes back surgery, cholecystectomy, knee surgery, joint replacement.        The history is provided by the patient and a relative.     Review of patient's allergies indicates:   Allergen Reactions    Codeine Itching     Can take oxycodone and hydrocodone with Benadryl     Past Medical History:   Diagnosis Date    Anemia     Arthritis     Asthma     allergic airway    Depression     Diabetes mellitus     Eosinophilia     Gout     Gout     Hyperlipidemia     Hypertension     Low back pain     MRSA carrier     Obesity     Renal disorder     Rotator cuff syndrome--left     Thyroid disease     Ulnar neuropathy of right upper extremity      Past Surgical History:   Procedure Laterality Date    ACHILLES TENDON SURGERY Left May 2015    BACK SURGERY      CHOLECYSTECTOMY      COLONOSCOPY N/A 9/6/2017    Procedure: COLONOSCOPY;  Surgeon: Marisol Bryson,  MD;  Location: [facility];  Service: Endoscopy;  Laterality: N/A;    HEMORRHOID SURGERY      JOINT REPLACEMENT      left    KNEE SURGERY      SHOULDER ARTHROSCOPY      SHOULDER SURGERY       Family History   Problem Relation Age of Onset    Diabetes Mother     Alzheimer's disease Mother     Thyroid disease Mother     Hyperlipidemia Mother     Heart disease Father     Stroke Father     Diabetes Sister     Lupus Sister     Ovarian cancer Sister     Heart disease Brother      Social History   Substance Use Topics    Smoking status: Never Smoker    Smokeless tobacco: Never Used    Alcohol use No     Review of Systems   Constitutional: Negative for fever.   HENT: Negative for rhinorrhea and sore throat.    Respiratory: Positive for shortness of breath (Chronic.). Negative for cough.    Cardiovascular:        Positive for chest heaviness.   Gastrointestinal: Positive for nausea. Negative for abdominal pain, blood in stool, diarrhea and vomiting.   Genitourinary: Negative for dysuria.   Musculoskeletal: Negative for back pain.        Positive for cramps in lower extremities.    Skin: Negative for rash.   Neurological: Positive for weakness and headaches.   Hematological: Does not bruise/bleed easily.       Physical Exam     Initial Vitals [10/21/17 1453]   BP Pulse Resp Temp SpO2   (!) 154/80 86 16 98.3 °F (36.8 °C) 96 %      MAP       104.67         Physical Exam    Nursing note and vitals reviewed.  Constitutional: She appears well-developed.   HENT:   Head: Normocephalic and atraumatic.   Mouth/Throat: Mucous membranes are dry.   Eyes: EOM are normal. Pupils are equal, round, and reactive to light.   Neck: Neck supple.   Cardiovascular: Normal rate, regular rhythm, normal heart sounds, intact distal pulses and normal pulses. Exam reveals no gallop and no friction rub.    No murmur heard.  Pulmonary/Chest: Breath sounds normal. No respiratory distress. She has no decreased breath sounds. She has no  wheezes. She has no rhonchi. She has no rales.   Abdominal: Soft. Bowel sounds are normal. She exhibits no distension. There is no tenderness. There is no rebound.   Musculoskeletal: Normal range of motion.   No active cramping, swelling, erythema.   Neurological: She is alert and oriented to person, place, and time.   Skin: Skin is warm and dry.   Old scar on left anterior knee. Scar on lower back.   Psychiatric: She has a normal mood and affect.         ED Course   Procedures  Labs Reviewed - No data to display          Medical Decision Making:   History:   Old Medical Records: I decided to obtain old medical records.  Clinical Tests:   Lab Tests: Ordered and Reviewed  Medical Tests: Reviewed and Ordered  Patient is a 57-year-old female who presents to the ER with mostly weakness nausea headache and cramps.  She then mentioned  some mild chest discomfort.  The patient appears to have some worsening of her renal function.  EKG shows no signs of significant ST changes.  She does have LVH.  I feel that the patient's symptoms are likely from chronic kidney disease with mild dehydration.  Patient is stable for discharge.            Scribe Attestation:   Scribe #1: I performed the above scribed service and the documentation accurately describes the services I performed. I attest to the accuracy of the note.    I, Dr. Yonny Lopez personally performed the services described in this documentation. All medical record entries made by the scribe were at my direction and in my presence.  I have reviewed the chart and agree that the record reflects my personal performance and is accurate and complete. Yonny Lopez MD.  8:04 PM 10/21/2017         ED Course      Clinical Impression:   The primary encounter diagnosis was Dehydration. A diagnosis of Stage 4 chronic kidney disease was also pertinent to this visit.                           Yonny Lopez MD  10/21/17 2005

## 2017-11-03 ENCOUNTER — LAB VISIT (OUTPATIENT)
Dept: LAB | Facility: HOSPITAL | Age: 57
End: 2017-11-03
Attending: INTERNAL MEDICINE
Payer: MEDICAID

## 2017-11-03 ENCOUNTER — OFFICE VISIT (OUTPATIENT)
Dept: TRANSPLANT | Facility: CLINIC | Age: 57
End: 2017-11-03
Payer: MEDICAID

## 2017-11-03 VITALS
HEART RATE: 79 BPM | BODY MASS INDEX: 37.06 KG/M2 | OXYGEN SATURATION: 97 % | HEIGHT: 67 IN | WEIGHT: 236.13 LBS | RESPIRATION RATE: 18 BRPM | SYSTOLIC BLOOD PRESSURE: 159 MMHG | DIASTOLIC BLOOD PRESSURE: 88 MMHG | TEMPERATURE: 99 F

## 2017-11-03 DIAGNOSIS — Z76.82 ORGAN TRANSPLANT CANDIDATE: Primary | ICD-10-CM

## 2017-11-03 DIAGNOSIS — Z01.818 PRE-OP EXAMINATION: ICD-10-CM

## 2017-11-03 PROCEDURE — 99213 OFFICE O/P EST LOW 20 MIN: CPT | Mod: PBBFAC,TXP

## 2017-11-03 PROCEDURE — 86832 HLA CLASS I HIGH DEFIN QUAL: CPT | Mod: PO,TXP

## 2017-11-03 PROCEDURE — 86833 HLA CLASS II HIGH DEFIN QUAL: CPT | Mod: PO,TXP

## 2017-11-03 PROCEDURE — 99214 OFFICE O/P EST MOD 30 MIN: CPT | Mod: S$PBB,TXP,, | Performed by: TRANSPLANT SURGERY

## 2017-11-03 PROCEDURE — 99999 PR PBB SHADOW E&M-EST. PATIENT-LVL III: CPT | Mod: PBBFAC,TXP,,

## 2017-11-03 NOTE — PROGRESS NOTES
Transplant Surgery  Kidney Transplant Recipient Evaluation    Referring Physician: Dalton Heaton  Current Nephrologist: Dalton Heaton    Subjective:     Reason for Visit: evaluate transplant candidacy    History of Present Illness: Andra Newell is a 57 y.o. year old female undergoing transplant evaluation.    Dialysis History: Andra is pre-dialysis.      Transplant History: N/A    Etiology of Renal Disease: Diabetes Mellitus - Type II (based on medical records from referral).    Review of Systems   Constitutional: Negative for chills and fever.   HENT: Negative for facial swelling and sore throat.    Eyes: Negative for redness and itching.   Respiratory: Negative for cough and shortness of breath.    Cardiovascular: Negative for chest pain and leg swelling.   Gastrointestinal: Negative for abdominal distention and abdominal pain.   Endocrine: Negative for polydipsia and polyphagia.   Genitourinary: Negative for dysuria and hematuria.   Musculoskeletal: Negative for back pain and myalgias.   Skin: Negative for pallor and rash.   Allergic/Immunologic: Negative for environmental allergies and immunocompromised state.   Neurological: Negative for light-headedness and headaches.   Hematological: Negative for adenopathy. Does not bruise/bleed easily.   Psychiatric/Behavioral: Negative for agitation and confusion.       Objective:     Physical Exam:  Constitutional:   Vitals reviewed: yes   Well-nourished and well-groomed: yes  Eyes:   Sclerae icteric: no   Extraocular movements intact: yes  GI:    Bowel sounds normal: yes   Tenderness: no    If yes, quadrant/location: not applicable   Palpable masses: no    If yes, quadrant/location: not applicable   Hepatosplenomegaly: no   Ascites: no   Hernia: no    If yes, type/location: not applicable   Surgical scars: no    If yes, type/location: not applicable  Resp:   Effort normal: yes   Breath sounds normal: yes    CV:   Regular rate and rhythm: yes   Heart sounds  normal: yes   Femoral pulses normal: yes   Extremities edematous: no  Skin:   Rashes or lesions present: no    If yes, describe:not applicable   Jaundice:: no    Musculoskeletal:   Gait normal: yes   Strength normal: yes  Psych:   Oriented to person, place, and time: yes   Affect and mood normal: yes    Additional comments: not applicable    Counseling: We provided Andra Newell with a group education session today.  We discussed kidney transplantation at length with her, including risks, potential complications, and alternatives in the management of her renal failure.  The discussion included complications related to anesthesia, bleeding, infection, primary nonfunction, and ATN.  I discussed the typical postoperative course, length of hospitalization, the need for long-term immunosuppression, and the need for long-term routine follow-up.  I discussed living-donor and -donor transplantation and the relative advantages and disadvantages of each.  I also discussed average waiting times for both living donation and  donation.  I discussed national and center-specific survival rates.  I also mentioned the potential benefit of multicenter listing to candidates listed with centers within more than one organ procurement organization.  All questions were answered.    Final determination of transplant candidacy will be made once evaluation is complete and reviewed by the Kidney & Kidney/Pancreas Selection Committee.         Transplant Surgery - Candidacy   Assessment/Plan:   Andra Newell is pre-dialysis with CKD stage 4 (GFR 15-29 mL/min). I see no surgical contraindication to placing a kidney transplant. Based on available information, Andra Newell is a suitable kidney transplant candidate. I strongly encouraged weight loss and living donation.     Yonny Fuentes MD

## 2017-11-27 ENCOUNTER — TELEPHONE (OUTPATIENT)
Dept: TRANSPLANT | Facility: CLINIC | Age: 57
End: 2017-11-27

## 2017-11-27 DIAGNOSIS — Z76.82 PRE-KIDNEY TRANSPLANT, PATIENT ON TRANSPLANT LIST: Primary | ICD-10-CM

## 2017-11-27 NOTE — TELEPHONE ENCOUNTER
"  KIDNEY WAIT LISTING NOTE    Date of Financial clearance to list: 2017    [facility]:     Organ: Kidney  Name:       Andra Newell   : 1960          Gender:     female    MRN#: [id number]                                 State of Permanent Residence:  [address] [place] [address]  Ethnicity: /White   Race:      White    CLINICAL INFORMATION   Candidate Medical Urgency Status: Active  Number of Previous Kidney Transplants: 0  Number of Previous Solid Organ Transplants: 0  Did you enter number of previous kidney or other solid organ transplants? yes  Is this Candidate a Prior Living Donor: no  (If yes, please generate letter to [employer] with patient's date of donation, recipient SSN, signed by Surgical Director after patient is listed in order to receive priority points).      ABO  ABO Blood Group:   B POS     ABO Confirmation: (THESE DATES MUST BE PRIOR TO THE LIST DATE AND SUPPORTED BY SEPARATE LAB REPORTS)    Internal Results    Lab Results   Component Value Date    GROUPTRH B POS 2017    GROUPTRH B POS 2011     No results found for: ABO    External Results    ABO Date 1:    ABO Date 2  Are either of these ABO results based on External Labs? no  (If Yes, STOP and go to source document in Media Tab for verification).    VITALS  Height:  5' 5.827"  Weight:  106.3kg (234lb, 5.6 oz)  (Use height from Transplant clinic visits only).  Did you enter height/weight? Yes    HLA    Class I:  Lab Results   Component Value Date    GEIH1DC 1 2017    KCLO4MP 11 2017    RUMZ0EU 7 2017    FKOJ2IV XX 2017    VKUVZ4BH 6 2017    MXQRS1DO XX 2017    ZHUDX7VK 7 2017    VFHEH7NM XX 2017       Class II:  Lab Results   Component Value Date    UVKSIU76PB 15 2017    BSBHUE88EI XX 2017    DUOGSD327IX 51 2017    QVBCPP1414 XX 2017    TSGBZ5SF 6 2017    EJIBQ5CH XX 2017       Tested for HLA Antibodies: Yes, " "antibodies detected     If result is "Positive" antibodies are detected     If result is "Negative or questionable" no antibodies detected    Lab Results   Component Value Date    CIPRAS Positive 07/12/2017    CIIPRAS Positive 07/12/2017       DIALYSIS INFORMATION  Is patient Pre-Dialysis: Yes     Report GFR being used as the criteria for placement on the kidney list. If not, leave blank  GFR < or = 20 ml/min? Yes  If Yes, Specify value  _19__   ml/min     Initial date GFR became 20 or less: 8/29/17  Is GFR obtained from an Outside lab Result? No  (If YES verify with source document scanned into media)    If patient on Dialysis:    Is candidate currently on dialysis for ESRD? No  If Yes,  Date Chronic Dialysis Started:   (Not currently on dialysis)  (verify with source document in Media Tab)   Dialysis Unit Name: Pre Dialysis Non-[facility] HLA draw                        Physician Name:  Dr. Maureen Mercado  NPI#: [id number]    DIABETES INFORMATION  Primary Native Kidney Diagnosis: Diabetes Mellitus - Type II  C-Peptide Value - No results found for: CPEPTIDE  Current Diabetes Status: Type II    FOR NON-KIDNEY DEPARTMENT USE ONLY:  Additional Organs Registered? none    Maximum Acceptable Number of HLA Mismatches  ABDR:     6      (0-6)               AB:               (0-4)  ADR:   _____  (0-4)              BDR: _____ (0-4)  A:        _____  (0-2)              B:      _____ (0-2)          DR: ______ (0-2)    Will Recipient Accept?   Accept HBcAB Positive Organ:            Yes  Accept HBV DERRICK Positive Organ:        no  Accept HCV Antibody Positive Organ: no   Accept HCV DERRICK Positive Organ: no  Accept KDPI > 85 Donor ?: No                        Local: No                           Import: No    ### NURSE TO VERIFY CONSENT AND MAKE ANY NECESSARY CHANGES NEEDED IN UNET AT THE TIME OF VERIFICATION ###    Unacceptible Antigens  If yes, list     Lab Results   Component Value Date    EB9YAPH  07/12/2017     " A32,A3,A23,B57,B49,B51,B44,A24,B37,B58,B47,B52,B63,B38    CIABCLM B*27:05--WEAK B59,B77 07/12/2017    CIIAB DQ7,DQ9 07/12/2017    ABCMT DRB1*12:01--WEAK DQ8,DRB1*16:02 07/12/2017       ### DO NOT LIST IF ANTIGEN VALUE WEAK ###

## 2017-11-28 DIAGNOSIS — Z76.82 AWAITING ORGAN TRANSPLANT STATUS: Primary | ICD-10-CM

## 2017-12-01 ENCOUNTER — LAB VISIT (OUTPATIENT)
Dept: LAB | Facility: HOSPITAL | Age: 57
End: 2017-12-01
Attending: INTERNAL MEDICINE
Payer: MEDICAID

## 2017-12-01 DIAGNOSIS — E78.5 HYPERLIPEMIA: ICD-10-CM

## 2017-12-01 DIAGNOSIS — F45.8 ANXIETY HYPERVENTILATION: ICD-10-CM

## 2017-12-01 DIAGNOSIS — E11.22 TYPE 2 DIABETES MELLITUS WITH END-STAGE RENAL DISEASE: ICD-10-CM

## 2017-12-01 DIAGNOSIS — D72.10 EOSINOPHILIA: ICD-10-CM

## 2017-12-01 DIAGNOSIS — I51.9 MYXEDEMA HEART DISEASE: ICD-10-CM

## 2017-12-01 DIAGNOSIS — F33.40 RECURRENT MAJOR DEPRESSION IN REMISSION: ICD-10-CM

## 2017-12-01 DIAGNOSIS — N18.6 TYPE 2 DIABETES MELLITUS WITH END-STAGE RENAL DISEASE: ICD-10-CM

## 2017-12-01 DIAGNOSIS — D63.1 ANEMIA OF CHRONIC RENAL FAILURE: ICD-10-CM

## 2017-12-01 DIAGNOSIS — E03.9 MYXEDEMA HEART DISEASE: ICD-10-CM

## 2017-12-01 DIAGNOSIS — N18.9 ANEMIA OF CHRONIC RENAL FAILURE: ICD-10-CM

## 2017-12-01 DIAGNOSIS — N10 ACUTE PYELONEPHRITIS WITHOUT LESION OF RENAL MEDULLARY NECROSIS: Primary | ICD-10-CM

## 2017-12-01 DIAGNOSIS — F41.9 ANXIETY HYPERVENTILATION: ICD-10-CM

## 2017-12-01 DIAGNOSIS — N18.4 CHRONIC KIDNEY DISEASE, STAGE IV (SEVERE): ICD-10-CM

## 2017-12-01 LAB
25(OH)D3+25(OH)D2 SERPL-MCNC: 15 NG/ML
ALBUMIN SERPL BCP-MCNC: 3.4 G/DL
ANION GAP SERPL CALC-SCNC: 11 MMOL/L
BACTERIA #/AREA URNS HPF: ABNORMAL /HPF
BASOPHILS # BLD AUTO: 0 K/UL
BASOPHILS NFR BLD: 0.5 %
BILIRUB UR QL STRIP: NEGATIVE
BUN SERPL-MCNC: 65 MG/DL
CALCIUM SERPL-MCNC: 9.3 MG/DL
CHLORIDE SERPL-SCNC: 108 MMOL/L
CLARITY UR: ABNORMAL
CO2 SERPL-SCNC: 22 MMOL/L
COLOR UR: YELLOW
CREAT SERPL-MCNC: 3 MG/DL
CREAT UR-MCNC: 82.4 MG/DL
DIFFERENTIAL METHOD: ABNORMAL
EOSINOPHIL # BLD AUTO: 0.6 K/UL
EOSINOPHIL NFR BLD: 6.9 %
ERYTHROCYTE [DISTWIDTH] IN BLOOD BY AUTOMATED COUNT: 15.3 %
EST. GFR  (AFRICAN AMERICAN): 19 ML/MIN/1.73 M^2
EST. GFR  (NON AFRICAN AMERICAN): 16 ML/MIN/1.73 M^2
GLUCOSE SERPL-MCNC: 200 MG/DL
GLUCOSE UR QL STRIP: NEGATIVE
HCT VFR BLD AUTO: 33.7 %
HGB BLD-MCNC: 11.1 G/DL
HGB UR QL STRIP: ABNORMAL
HYALINE CASTS #/AREA URNS LPF: 0 /LPF
KETONES UR QL STRIP: NEGATIVE
LEUKOCYTE ESTERASE UR QL STRIP: ABNORMAL
LYMPHOCYTES # BLD AUTO: 1.7 K/UL
LYMPHOCYTES NFR BLD: 19.3 %
MCH RBC QN AUTO: 28 PG
MCHC RBC AUTO-ENTMCNC: 33.1 G/DL
MCV RBC AUTO: 85 FL
MICROSCOPIC COMMENT: ABNORMAL
MONOCYTES # BLD AUTO: 0.6 K/UL
MONOCYTES NFR BLD: 7.1 %
NEUTROPHILS # BLD AUTO: 5.9 K/UL
NEUTROPHILS NFR BLD: 66.2 %
NITRITE UR QL STRIP: NEGATIVE
PH UR STRIP: 6 [PH] (ref 5–8)
PHOSPHATE SERPL-MCNC: 4.3 MG/DL
PLATELET # BLD AUTO: 223 K/UL
PMV BLD AUTO: 8 FL
POTASSIUM SERPL-SCNC: 4.6 MMOL/L
PROT UR QL STRIP: ABNORMAL
PROT UR-MCNC: 172 MG/DL
PROT/CREAT RATIO, UR: 2.09
PTH-INTACT SERPL-MCNC: 202.5 PG/ML
RBC # BLD AUTO: 3.98 M/UL
RBC #/AREA URNS HPF: 0 /HPF (ref 0–4)
SODIUM SERPL-SCNC: 141 MMOL/L
SP GR UR STRIP: 1.02 (ref 1–1.03)
URN SPEC COLLECT METH UR: ABNORMAL
UROBILINOGEN UR STRIP-ACNC: NEGATIVE EU/DL
WBC # BLD AUTO: 8.9 K/UL
WBC #/AREA URNS HPF: 30 /HPF (ref 0–5)
WBC CLUMPS URNS QL MICRO: ABNORMAL

## 2017-12-01 PROCEDURE — 80069 RENAL FUNCTION PANEL: CPT | Mod: TXP

## 2017-12-01 PROCEDURE — 81000 URINALYSIS NONAUTO W/SCOPE: CPT | Mod: TXP

## 2017-12-01 PROCEDURE — 36415 COLL VENOUS BLD VENIPUNCTURE: CPT | Mod: TXP

## 2017-12-01 PROCEDURE — 84156 ASSAY OF PROTEIN URINE: CPT | Mod: TXP

## 2017-12-01 PROCEDURE — 82306 VITAMIN D 25 HYDROXY: CPT | Mod: TXP

## 2017-12-01 PROCEDURE — 83970 ASSAY OF PARATHORMONE: CPT | Mod: TXP

## 2017-12-01 PROCEDURE — 85025 COMPLETE CBC W/AUTO DIFF WBC: CPT | Mod: TXP

## 2017-12-04 LAB — HPRA INTERPRETATION: NORMAL

## 2017-12-18 LAB
CLASS I ANTIBODIES - LUMINEX: NORMAL
CLASS I ANTIBODY COMMENTS - LUMINEX: NORMAL
CLASS II ANTIBODIES - LUMINEX: NORMAL
CLASS II ANTIBODY COMMENTS - LUMINEX: NORMAL
CPRA %: 91
SERUM COLLECTION DT - LUMINEX CLASS I: NORMAL
SERUM COLLECTION DT - LUMINEX CLASS II: NORMAL
SPCL1 TESTING DATE: NORMAL
SPCL2 TESTING DATE: NORMAL
SPCLU TESTING DATE: NORMAL

## 2017-12-20 DIAGNOSIS — Z76.82 ORGAN TRANSPLANT CANDIDATE: Primary | ICD-10-CM

## 2017-12-23 ENCOUNTER — LAB VISIT (OUTPATIENT)
Dept: LAB | Facility: HOSPITAL | Age: 57
End: 2017-12-23
Payer: MEDICAID

## 2017-12-23 DIAGNOSIS — Z76.82 ORGAN TRANSPLANT CANDIDATE: ICD-10-CM

## 2017-12-23 PROCEDURE — 86825 HLA X-MATH NON-CYTOTOXIC: CPT | Mod: PO,TXP

## 2017-12-23 PROCEDURE — 86825 HLA X-MATH NON-CYTOTOXIC: CPT | Mod: 91,PO,TXP

## 2017-12-28 LAB
B CELL RESULTS - XM ALLO: POSITIVE
B MCS AVERAGE - XM ALLO: 80
DONOR MRN: NORMAL
FXMAL TESTING DATE: NORMAL
HLA FLOW CROSSMATCH (ALLO) INTERPRETATION: NORMAL
SERUM COLLECTION DT - XM ALLO: NORMAL
T CELL RESULTS - XM ALLO: POSITIVE
T MCS AVERAGE - XM ALLO: 63

## 2018-01-08 ENCOUNTER — LAB VISIT (OUTPATIENT)
Dept: LAB | Facility: HOSPITAL | Age: 58
End: 2018-01-08
Attending: NURSE PRACTITIONER
Payer: COMMERCIAL

## 2018-01-08 ENCOUNTER — CLINICAL SUPPORT (OUTPATIENT)
Dept: INFECTIOUS DISEASES | Facility: CLINIC | Age: 58
End: 2018-01-08
Payer: MEDICAID

## 2018-01-08 DIAGNOSIS — Z76.82 AWAITING ORGAN TRANSPLANT STATUS: ICD-10-CM

## 2018-01-08 DIAGNOSIS — Z23 NEED FOR VACCINATION: Primary | ICD-10-CM

## 2018-01-08 PROCEDURE — 90471 IMMUNIZATION ADMIN: CPT | Mod: S$GLB,TXP,, | Performed by: INTERNAL MEDICINE

## 2018-01-08 PROCEDURE — 99001 SPECIMEN HANDLING PT-LAB: CPT | Mod: PO,TXP

## 2018-01-08 PROCEDURE — 90636 HEP A/HEP B VACC ADULT IM: CPT | Mod: S$GLB,TXP,, | Performed by: INTERNAL MEDICINE

## 2018-01-19 DIAGNOSIS — Z76.82 ORGAN TRANSPLANT CANDIDATE: Primary | ICD-10-CM

## 2018-01-29 ENCOUNTER — LAB VISIT (OUTPATIENT)
Dept: LAB | Facility: HOSPITAL | Age: 58
End: 2018-01-29
Payer: MEDICAID

## 2018-01-29 ENCOUNTER — LAB VISIT (OUTPATIENT)
Dept: LAB | Facility: HOSPITAL | Age: 58
End: 2018-01-29
Attending: INTERNAL MEDICINE
Payer: MEDICAID

## 2018-01-29 DIAGNOSIS — N18.9 ANEMIA OF CHRONIC RENAL FAILURE: ICD-10-CM

## 2018-01-29 DIAGNOSIS — F33.40 RECURRENT MAJOR DEPRESSION IN REMISSION: ICD-10-CM

## 2018-01-29 DIAGNOSIS — N18.4 CHRONIC KIDNEY DISEASE, STAGE IV (SEVERE): ICD-10-CM

## 2018-01-29 DIAGNOSIS — I43 DILATED CARDIOMYOPATHY SECONDARY TO ELECTROLYTE DEFICIENCY: ICD-10-CM

## 2018-01-29 DIAGNOSIS — N10 ACUTE PYELONEPHRITIS WITHOUT LESION OF RENAL MEDULLARY NECROSIS: Primary | ICD-10-CM

## 2018-01-29 DIAGNOSIS — Z76.82 ORGAN TRANSPLANT CANDIDATE: ICD-10-CM

## 2018-01-29 DIAGNOSIS — E21.1 HYPERPARATHYROIDISM DUE TO 1,25(0H)2D3: ICD-10-CM

## 2018-01-29 DIAGNOSIS — E87.8 DILATED CARDIOMYOPATHY SECONDARY TO ELECTROLYTE DEFICIENCY: ICD-10-CM

## 2018-01-29 DIAGNOSIS — D63.1 ANEMIA OF CHRONIC RENAL FAILURE: ICD-10-CM

## 2018-01-29 DIAGNOSIS — Z76.82 ORGAN TRANSPLANT CANDIDATE: Primary | ICD-10-CM

## 2018-01-29 DIAGNOSIS — E03.9 MYXEDEMA HEART DISEASE: ICD-10-CM

## 2018-01-29 DIAGNOSIS — E78.5 HYPERLIPEMIA: ICD-10-CM

## 2018-01-29 DIAGNOSIS — N18.6 TYPE 2 DIABETES MELLITUS WITH END-STAGE RENAL DISEASE: ICD-10-CM

## 2018-01-29 DIAGNOSIS — D72.10 EOSINOPHILIA: ICD-10-CM

## 2018-01-29 DIAGNOSIS — F45.8 ANXIETY HYPERVENTILATION: ICD-10-CM

## 2018-01-29 DIAGNOSIS — F41.9 ANXIETY HYPERVENTILATION: ICD-10-CM

## 2018-01-29 DIAGNOSIS — E55.9 AVITAMINOSIS D: ICD-10-CM

## 2018-01-29 DIAGNOSIS — E11.22 TYPE 2 DIABETES MELLITUS WITH END-STAGE RENAL DISEASE: ICD-10-CM

## 2018-01-29 DIAGNOSIS — I51.9 MYXEDEMA HEART DISEASE: ICD-10-CM

## 2018-01-29 LAB
25(OH)D3+25(OH)D2 SERPL-MCNC: 12 NG/ML
ALBUMIN SERPL BCP-MCNC: 3.4 G/DL
ANION GAP SERPL CALC-SCNC: 11 MMOL/L
ANION GAP SERPL CALC-SCNC: 11 MMOL/L
BACTERIA #/AREA URNS HPF: ABNORMAL /HPF
BASOPHILS # BLD AUTO: 0 K/UL
BASOPHILS NFR BLD: 0.4 %
BILIRUB UR QL STRIP: NEGATIVE
BUN SERPL-MCNC: 55 MG/DL
BUN SERPL-MCNC: 55 MG/DL
CALCIUM SERPL-MCNC: 9.8 MG/DL
CALCIUM SERPL-MCNC: 9.8 MG/DL
CHLORIDE SERPL-SCNC: 110 MMOL/L
CHLORIDE SERPL-SCNC: 110 MMOL/L
CHOLEST SERPL-MCNC: 157 MG/DL
CHOLEST/HDLC SERPL: 5.2 {RATIO}
CLARITY UR: CLEAR
CO2 SERPL-SCNC: 21 MMOL/L
CO2 SERPL-SCNC: 21 MMOL/L
COLOR UR: YELLOW
CREAT SERPL-MCNC: 2.8 MG/DL
CREAT SERPL-MCNC: 2.8 MG/DL
DIFFERENTIAL METHOD: ABNORMAL
EOSINOPHIL # BLD AUTO: 0.6 K/UL
EOSINOPHIL NFR BLD: 5.9 %
ERYTHROCYTE [DISTWIDTH] IN BLOOD BY AUTOMATED COUNT: 15.5 %
EST. GFR  (AFRICAN AMERICAN): 21 ML/MIN/1.73 M^2
EST. GFR  (AFRICAN AMERICAN): 21 ML/MIN/1.73 M^2
EST. GFR  (NON AFRICAN AMERICAN): 18 ML/MIN/1.73 M^2
EST. GFR  (NON AFRICAN AMERICAN): 18 ML/MIN/1.73 M^2
ESTIMATED AVG GLUCOSE: 192 MG/DL
GLUCOSE SERPL-MCNC: 169 MG/DL
GLUCOSE SERPL-MCNC: 169 MG/DL
GLUCOSE UR QL STRIP: NEGATIVE
HBA1C MFR BLD HPLC: 8.3 %
HCT VFR BLD AUTO: 33 %
HDLC SERPL-MCNC: 30 MG/DL
HDLC SERPL: 19.1 %
HGB BLD-MCNC: 11.2 G/DL
HGB UR QL STRIP: ABNORMAL
HYALINE CASTS #/AREA URNS LPF: 0 /LPF
KETONES UR QL STRIP: NEGATIVE
LDLC SERPL CALC-MCNC: 94.8 MG/DL
LEUKOCYTE ESTERASE UR QL STRIP: ABNORMAL
LYMPHOCYTES # BLD AUTO: 2.1 K/UL
LYMPHOCYTES NFR BLD: 21 %
MCH RBC QN AUTO: 28.5 PG
MCHC RBC AUTO-ENTMCNC: 34 G/DL
MCV RBC AUTO: 84 FL
MICROSCOPIC COMMENT: ABNORMAL
MONOCYTES # BLD AUTO: 0.6 K/UL
MONOCYTES NFR BLD: 5.8 %
NEUTROPHILS # BLD AUTO: 6.6 K/UL
NEUTROPHILS NFR BLD: 66.9 %
NITRITE UR QL STRIP: NEGATIVE
NONHDLC SERPL-MCNC: 127 MG/DL
PH UR STRIP: 6 [PH] (ref 5–8)
PHOSPHATE SERPL-MCNC: 4.7 MG/DL
PLATELET # BLD AUTO: 229 K/UL
PMV BLD AUTO: 8.2 FL
POTASSIUM SERPL-SCNC: 4.6 MMOL/L
POTASSIUM SERPL-SCNC: 4.6 MMOL/L
PROT UR QL STRIP: ABNORMAL
PTH-INTACT SERPL-MCNC: 82.7 PG/ML
RBC # BLD AUTO: 3.93 M/UL
RBC #/AREA URNS HPF: 3 /HPF (ref 0–4)
SODIUM SERPL-SCNC: 142 MMOL/L
SODIUM SERPL-SCNC: 142 MMOL/L
SP GR UR STRIP: 1.02 (ref 1–1.03)
SQUAMOUS #/AREA URNS HPF: 7 /HPF
TRIGL SERPL-MCNC: 161 MG/DL
URN SPEC COLLECT METH UR: ABNORMAL
UROBILINOGEN UR STRIP-ACNC: NEGATIVE EU/DL
WBC # BLD AUTO: 9.8 K/UL
WBC #/AREA URNS HPF: 12 /HPF (ref 0–5)

## 2018-01-29 PROCEDURE — 86826 HLA X-MATCH NONCYTOTOXC ADDL: CPT | Mod: 91,PO,TXP

## 2018-01-29 PROCEDURE — 81000 URINALYSIS NONAUTO W/SCOPE: CPT | Mod: NTX

## 2018-01-29 PROCEDURE — 83970 ASSAY OF PARATHORMONE: CPT | Mod: NTX

## 2018-01-29 PROCEDURE — 86825 HLA X-MATH NON-CYTOTOXIC: CPT | Mod: 91,PO,TXP

## 2018-01-29 PROCEDURE — 85025 COMPLETE CBC W/AUTO DIFF WBC: CPT | Mod: TXP

## 2018-01-29 PROCEDURE — 83036 HEMOGLOBIN GLYCOSYLATED A1C: CPT | Mod: NTX

## 2018-01-29 PROCEDURE — 80061 LIPID PANEL: CPT | Mod: NTX

## 2018-01-29 PROCEDURE — 82306 VITAMIN D 25 HYDROXY: CPT | Mod: NTX

## 2018-01-29 PROCEDURE — 80069 RENAL FUNCTION PANEL: CPT | Mod: NTX

## 2018-01-29 PROCEDURE — 36415 COLL VENOUS BLD VENIPUNCTURE: CPT | Mod: TXP

## 2018-01-31 LAB
HLA FREEZE AND HOLD INTERPRETATION: NORMAL
HLAFH COLLECTION DATE: NORMAL
HPRA INTERPRETATION: NORMAL

## 2018-02-05 ENCOUNTER — OFFICE VISIT (OUTPATIENT)
Dept: ENDOCRINOLOGY | Facility: CLINIC | Age: 58
End: 2018-02-05
Payer: COMMERCIAL

## 2018-02-05 ENCOUNTER — PATIENT MESSAGE (OUTPATIENT)
Dept: ENDOCRINOLOGY | Facility: CLINIC | Age: 58
End: 2018-02-05

## 2018-02-05 ENCOUNTER — LAB VISIT (OUTPATIENT)
Dept: LAB | Facility: HOSPITAL | Age: 58
End: 2018-02-05
Payer: COMMERCIAL

## 2018-02-05 VITALS
DIASTOLIC BLOOD PRESSURE: 80 MMHG | BODY MASS INDEX: 37.34 KG/M2 | HEIGHT: 67 IN | HEART RATE: 73 BPM | WEIGHT: 237.88 LBS | SYSTOLIC BLOOD PRESSURE: 152 MMHG

## 2018-02-05 DIAGNOSIS — E11.65 TYPE 2 DIABETES MELLITUS WITH HYPERGLYCEMIA, UNSPECIFIED LONG TERM INSULIN USE STATUS: Primary | ICD-10-CM

## 2018-02-05 DIAGNOSIS — Z76.82 AWAITING ORGAN TRANSPLANT STATUS: ICD-10-CM

## 2018-02-05 DIAGNOSIS — E66.9 OBESITY, UNSPECIFIED CLASSIFICATION, UNSPECIFIED OBESITY TYPE, UNSPECIFIED WHETHER SERIOUS COMORBIDITY PRESENT: ICD-10-CM

## 2018-02-05 DIAGNOSIS — E03.9 HYPOTHYROIDISM, UNSPECIFIED TYPE: ICD-10-CM

## 2018-02-05 LAB
B CELL RESULTS - XM ALLO: NEGATIVE
B CELL RESULTS - XM ALLO: NEGATIVE
B MCS AVERAGE - XM ALLO: 71
B MCS AVERAGE - XM ALLO: 78.5
DONOR MRN: NORMAL
DONOR MRN: NORMAL
FXMAL TESTING DATE: NORMAL
FXMAL TESTING DATE: NORMAL
HLA FLOW CROSSMATCH (ALLO) INTERPRETATION: NORMAL
SERUM COLLECTION DT - XM ALLO: NORMAL
SERUM COLLECTION DT - XM ALLO: NORMAL
T CELL RESULTS - XM ALLO: POSITIVE
T CELL RESULTS - XM ALLO: POSITIVE
T MCS AVERAGE - XM ALLO: 103
T MCS AVERAGE - XM ALLO: 103

## 2018-02-05 PROCEDURE — 99214 OFFICE O/P EST MOD 30 MIN: CPT | Mod: S$GLB,TXP,, | Performed by: INTERNAL MEDICINE

## 2018-02-05 PROCEDURE — 86832 HLA CLASS I HIGH DEFIN QUAL: CPT | Mod: PO,TXP

## 2018-02-05 PROCEDURE — 99999 PR PBB SHADOW E&M-EST. PATIENT-LVL IV: CPT | Mod: PBBFAC,TXP,, | Performed by: INTERNAL MEDICINE

## 2018-02-05 PROCEDURE — 86833 HLA CLASS II HIGH DEFIN QUAL: CPT | Mod: PO,TXP

## 2018-02-05 PROCEDURE — 3008F BODY MASS INDEX DOCD: CPT | Mod: S$GLB,TXP,, | Performed by: INTERNAL MEDICINE

## 2018-02-05 PROCEDURE — 99214 OFFICE O/P EST MOD 30 MIN: CPT | Mod: PBBFAC,TXP | Performed by: INTERNAL MEDICINE

## 2018-02-05 RX ORDER — INSULIN ASPART 100 [IU]/ML
INJECTION, SOLUTION INTRAVENOUS; SUBCUTANEOUS
Qty: 1 BOX | Refills: 5 | Status: SHIPPED | OUTPATIENT
Start: 2018-02-05 | End: 2018-07-02

## 2018-02-05 RX ORDER — LANCETS
EACH MISCELLANEOUS
Qty: 200 EACH | Refills: 5 | Status: SHIPPED | OUTPATIENT
Start: 2018-02-05 | End: 2018-04-02

## 2018-02-05 NOTE — PROGRESS NOTES
I have reviewed and concur with the resident's history, physical, assessment, and plan.  I have personally interviewed and examined the patient at bedside.  See below addendum for my evaluation and additional findings.    Ms. Newell is a 57 y.o. F with history of hypothyroidism, Dm2, Hl, HTN, obesity, kidney dysfunction on transplant list here for followup for DM and hypothryoidism.     Patient is a kidney transplantation candidate. Pt just had fistula creation.     Regarding hypothyroidism: Has had hypothyroidism in mid 50s, was discovered incidentally on labs.  At last visit we decreased her from 75mcg daily to 6 days per week, there has been no recheck.     Regarding her Dm2: has had since her 50s, discovered incidentally on labs, used to be on orals on insulin.   Now on basaglar - since last visit increased to 90units BID, also remains on amaryl. Last optho visit 1 year ago, unsure if there was retinopathy detected.   No FS log but FS here 170s fasting.  She relays most FS in high 100s fasting, but does not check it other times of day.  No hypoglycemia. A1c below.       A/P: Ms. Newell history of hypothyroidism, Dm2, Hl, HTN, obesity, kidney dysfunction on transplant list here for DM and hypothyroidism.    1. Dm2: A1c mildly increased to 8.3, very insulin resistant  Regimen is very basal heavy - would recommend adding on mealtime insulin 10 units novolog and titrating up as needed - insurance coverage may be an issue  Continue basaglar 90 twice daily, stop Amaryl as likely ineffective   Recommend checking late night salivary cortisols as pt w central obesity (although relays slow weight gain over the years)  Will need glucagon kit and ophtho referral  Check FS 2-3 times daily    2. HL: LDL acceptable, continue atorvastatin 20mg daily, unclear benefit in dialysis patients but pt tolerating currently without side effects    3. Hypothyroidism: TFTs today, continue levothryoxine 75mcg 6 days per week for  now    RTC 2 months

## 2018-02-05 NOTE — PATIENT INSTRUCTIONS
Start with novolog 10 units with each meal    Continue basaglar 90 units twice daily    Check sugars at least twice daily    Stop amaryl    >>>>>>>>>>  11pm for 2 nights in a row (cortisol), and return to lab

## 2018-02-05 NOTE — PROGRESS NOTES
Subjective:       Patient ID: Andra Newell is a 57 y.o. female.    Chief Complaint: Diabetes Mellitus    Mrs. Newell is a 57 year old female with a PMHx type 2 DM on glargine 90u BID A1c 8.3 1/2018, hypothyroidism on synthroid 75mcg last TSH 0.752 10/2017 , vit D def with Vit D level of 12, morbid obesity BMI 37.26, CKD 5, HTN, HLD last lipid panel 1/2018 , HDL 30, LDL 94.8,  who presents today for follow up of her DM. She is listed for kidney transplant and has a fistula in place and will likely go on iHD in ~ 2 weeks. Her glucose is still poorly controlled and is seeing fasting BGL ranging from 130-190. She denies any episodes of hyperglycemia beyond that. She denies any hypoglycemic episodes. She checks her BGL every other day but only during fasting periods. Her weight has been largely stable over time. Last optometry/opthal visit was 2-3 years unsure of hx of retinopathy but endorses blurry vision/double vision after prolonged episodes of reading. Had age based screening with c-scope last year - to RT in 3 years, all polyps benign. Patient endorses SOB with exertion but denies chest pain/palpitations, pt had pharm stress test last 7/2017 negative for ischemia, normal EF, no WMA and pulm HTN with ePAP of 41.       Review of Systems   Constitutional: Negative for chills and fever.   HENT: Negative for hearing loss, mouth sores, nosebleeds, rhinorrhea and sinus pain.    Eyes: Positive for visual disturbance.   Respiratory: Positive for shortness of breath.    Cardiovascular: Negative for chest pain, palpitations and leg swelling.   Gastrointestinal: Negative for constipation, diarrhea, nausea and vomiting.   Endocrine: Positive for polydipsia. Negative for polyphagia and polyuria.   Genitourinary: Positive for decreased urine volume. Negative for difficulty urinating, dysuria and hematuria.   Musculoskeletal: Positive for arthralgias.   Skin: Negative for color change, pallor, rash and  wound.   Allergic/Immunologic: Positive for immunocompromised state.   Neurological: Negative for seizures, numbness and headaches.       Objective:      Physical Exam   Constitutional: She is oriented to person, place, and time. She appears well-developed and well-nourished.   Central adiposity   HENT:   Head: Normocephalic and atraumatic.   Right Ear: External ear normal.   Left Ear: External ear normal.   Mouth/Throat: Oropharynx is clear and moist. Abnormal dentition.   Eyes: Conjunctivae and EOM are normal. Pupils are equal, round, and reactive to light.   Neck: Normal range of motion. Neck supple.   Cardiovascular: Normal rate, regular rhythm, S1 normal, S2 normal and intact distal pulses.    Murmur heard.   Systolic murmur is present with a grade of 3/6   Pulses:       Carotid pulses are 2+ on the right side, and 2+ on the left side.       Radial pulses are 2+ on the right side, and 2+ on the left side.   Pulmonary/Chest: Effort normal and breath sounds normal.   Abdominal: Soft. Bowel sounds are normal. She exhibits no distension and no mass. There is no hepatosplenomegaly. There is tenderness in the right lower quadrant. There is no rigidity, no rebound, no guarding and no CVA tenderness.   Musculoskeletal: Normal range of motion.   Feet:   Right Foot:   Protective Sensation: 5 sites tested. 5 sites sensed.   Left Foot:   Protective Sensation: 5 sites tested. 5 sites sensed.   Skin Integrity: Positive for callus.   Lymphadenopathy:        Head (right side): No submental, no submandibular, no tonsillar, no preauricular, no posterior auricular and no occipital adenopathy present.        Head (left side): No submental, no submandibular, no tonsillar, no preauricular, no posterior auricular and no occipital adenopathy present.     She has no cervical adenopathy.   Neurological: She is alert and oriented to person, place, and time. No cranial nerve deficit. GCS eye subscore is 4. GCS verbal subscore is 5. GCS  motor subscore is 6.   Skin: Skin is warm and dry. No rash noted.   Psychiatric: She has a normal mood and affect. Her behavior is normal.   Nursing note and vitals reviewed.      Assessment:       1. Type 2 diabetes mellitus with hyperglycemia, unspecified long term insulin use status    2. Hypothyroidism, unspecified type        Plan:       Type 2 diabetes mellitus with hyperglycemia, unspecified long term insulin use status  -     Cortisol, Saliva; Future; Expected date: 02/05/2018  -     Cortisol, Saliva; Future; Expected date: 02/05/2018  -      insulin aspart (NOVOLOG FLEXPEN) 100 unit/mL InPn pen; Up to 20 units 3 times daily with meals; follow physician instructions  Dispense: 1 Box; Refill: 5  -      Use 10u Novolog WM and record BGL twice daily  -      Will need opthal follow up for dilated diabetic retinal examination  -      Discontinue Amaryl    Hypothyroidism, unspecified type  -     TSH; Future; Expected date: 02/05/2018  -     T4, free; Future; Expected date: 02/05/2018    Other orders  -     insulin aspart (NOVOLOG FLEXPEN) 100 unit/mL InPn pen; Up to 20 units 3 times daily with meals; follow physician instructions  Dispense: 1 Box; Refill: 5  -     lancets Misc; To check BG 3-4 times daily, to use with insurance preferred meter  Dispense: 200 each; Refill: 5  -     blood sugar diagnostic Strp; To check BG 3-4 times daily, to use with insurance preferred meter  Dispense: 200 each; Refill: 5      RTC in 2 months    Patient seen and case reviewed with Dr. Mcdaniels.     Glenn Campuzano M.D. PGY-1  [facility]  10:22 AM  2/5/2018

## 2018-02-09 ENCOUNTER — PATIENT MESSAGE (OUTPATIENT)
Dept: ENDOCRINOLOGY | Facility: CLINIC | Age: 58
End: 2018-02-09

## 2018-02-09 ENCOUNTER — LAB VISIT (OUTPATIENT)
Dept: LAB | Facility: HOSPITAL | Age: 58
End: 2018-02-09
Attending: INTERNAL MEDICINE
Payer: MEDICAID

## 2018-02-09 DIAGNOSIS — E11.65 TYPE 2 DIABETES MELLITUS WITH HYPERGLYCEMIA, UNSPECIFIED LONG TERM INSULIN USE STATUS: ICD-10-CM

## 2018-02-09 DIAGNOSIS — E03.9 HYPOTHYROIDISM, UNSPECIFIED TYPE: Primary | ICD-10-CM

## 2018-02-09 PROCEDURE — 82533 TOTAL CORTISOL: CPT | Mod: 91,TXP

## 2018-02-12 LAB
CORTIS 12 AM SAL-MCNC: NORMAL NG/ML
CORTIS 12 AM SAL-MCNC: NORMAL NG/ML
CORTIS 8 AM SAL-MCNC: NORMAL NG/ML
CORTIS 8 AM SAL-MCNC: NORMAL NG/ML
CORTIS 8 PM SAL-MCNC: NORMAL NG/ML
CORTIS 8 PM SAL-MCNC: NORMAL NG/ML
CORTIS SAL-MCNC: NORMAL UG/DL
CORTIS SAL-MCNC: NORMAL UG/DL

## 2018-02-14 ENCOUNTER — TELEPHONE (OUTPATIENT)
Dept: PHARMACY | Facility: CLINIC | Age: 58
End: 2018-02-14

## 2018-02-14 ENCOUNTER — TELEPHONE (OUTPATIENT)
Dept: ENDOCRINOLOGY | Facility: CLINIC | Age: 58
End: 2018-02-14

## 2018-02-14 ENCOUNTER — PATIENT MESSAGE (OUTPATIENT)
Dept: ENDOCRINOLOGY | Facility: CLINIC | Age: 58
End: 2018-02-14

## 2018-02-14 DIAGNOSIS — E24.9 HYPERCORTISOLISM: Primary | ICD-10-CM

## 2018-02-14 NOTE — TELEPHONE ENCOUNTER
Talk to Lab pt salivary kit was ineffective. They need us to re order the kit if dr levin still wants it     ----- Message from Valentina Mix sent at 2/14/2018  7:47 AM CST -----  There was an issue with a test ordered on this patient from 02/09/2018. Please call the Sendout lab as soon as possible at [phone] ext. [phone] for complete details.  Anyone in the Sendout lab will be able to assist you with further information.

## 2018-02-15 LAB — HPRA INTERPRETATION: NORMAL

## 2018-02-19 LAB
CLASS I ANTIBODIES - LUMINEX: NORMAL
CLASS I ANTIBODY COMMENTS - LUMINEX: NORMAL
CLASS II ANTIBODIES - LUMINEX: NORMAL
CLASS II ANTIBODY COMMENTS - LUMINEX: NORMAL
CPRA %: 90
SERUM COLLECTION DT - LUMINEX CLASS I: NORMAL
SERUM COLLECTION DT - LUMINEX CLASS II: NORMAL
SPCL1 TESTING DATE: NORMAL
SPCL2 TESTING DATE: NORMAL
SPCLU TESTING DATE: NORMAL

## 2018-02-22 ENCOUNTER — HOSPITAL ENCOUNTER (EMERGENCY)
Facility: HOSPITAL | Age: 58
Discharge: HOME | End: 2018-02-22
Attending: EMERGENCY MEDICINE
Payer: MEDICAID

## 2018-02-22 VITALS
RESPIRATION RATE: 18 BRPM | TEMPERATURE: 98 F | DIASTOLIC BLOOD PRESSURE: 69 MMHG | BODY MASS INDEX: 36.88 KG/M2 | HEART RATE: 70 BPM | OXYGEN SATURATION: 99 % | HEIGHT: 67 IN | WEIGHT: 235 LBS | SYSTOLIC BLOOD PRESSURE: 151 MMHG

## 2018-02-22 DIAGNOSIS — R73.9 HYPERGLYCEMIA: ICD-10-CM

## 2018-02-22 DIAGNOSIS — N30.00 ACUTE CYSTITIS WITHOUT HEMATURIA: Primary | ICD-10-CM

## 2018-02-22 DIAGNOSIS — M10.9 EXACERBATION OF GOUT: ICD-10-CM

## 2018-02-22 DIAGNOSIS — R10.13 DYSPEPSIA: ICD-10-CM

## 2018-02-22 DIAGNOSIS — R53.1 WEAKNESS: ICD-10-CM

## 2018-02-22 DIAGNOSIS — R10.9 ABDOMINAL PAIN: ICD-10-CM

## 2018-02-22 LAB
ALBUMIN SERPL BCP-MCNC: 3.3 G/DL
ALLENS TEST: ABNORMAL
ALP SERPL-CCNC: 169 U/L
ALT SERPL W/O P-5'-P-CCNC: 19 U/L
ANION GAP SERPL CALC-SCNC: 11 MMOL/L
AST SERPL-CCNC: 18 U/L
B-OH-BUTYR BLD STRIP-SCNC: 0 MMOL/L
BACTERIA #/AREA URNS HPF: ABNORMAL /HPF
BASOPHILS # BLD AUTO: 0 K/UL
BASOPHILS NFR BLD: 0.4 %
BILIRUB SERPL-MCNC: 0.4 MG/DL
BILIRUB UR QL STRIP: NEGATIVE
BUN SERPL-MCNC: 73 MG/DL
CALCIUM SERPL-MCNC: 9.9 MG/DL
CHLORIDE SERPL-SCNC: 104 MMOL/L
CLARITY UR: ABNORMAL
CO2 SERPL-SCNC: 23 MMOL/L
COLOR UR: YELLOW
CREAT SERPL-MCNC: 3.4 MG/DL
DELSYS: ABNORMAL
DIFFERENTIAL METHOD: ABNORMAL
EOSINOPHIL # BLD AUTO: 0.6 K/UL
EOSINOPHIL NFR BLD: 5.4 %
ERYTHROCYTE [DISTWIDTH] IN BLOOD BY AUTOMATED COUNT: 15.7 %
EST. GFR  (AFRICAN AMERICAN): 16 ML/MIN/1.73 M^2
EST. GFR  (NON AFRICAN AMERICAN): 14 ML/MIN/1.73 M^2
FIO2: 21
GLUCOSE SERPL-MCNC: 239 MG/DL
GLUCOSE UR QL STRIP: ABNORMAL
HCO3 UR-SCNC: 24 MMOL/L (ref 24–28)
HCT VFR BLD AUTO: 34.1 %
HGB BLD-MCNC: 11.1 G/DL
HGB UR QL STRIP: ABNORMAL
HYALINE CASTS #/AREA URNS LPF: 0 /LPF
KETONES UR QL STRIP: NEGATIVE
LEUKOCYTE ESTERASE UR QL STRIP: ABNORMAL
LYMPHOCYTES # BLD AUTO: 2.1 K/UL
LYMPHOCYTES NFR BLD: 18.9 %
MCH RBC QN AUTO: 27.2 PG
MCHC RBC AUTO-ENTMCNC: 32.5 G/DL
MCV RBC AUTO: 84 FL
MICROSCOPIC COMMENT: ABNORMAL
MODE: ABNORMAL
MONOCYTES # BLD AUTO: 0.6 K/UL
MONOCYTES NFR BLD: 5.1 %
NEUTROPHILS # BLD AUTO: 7.9 K/UL
NEUTROPHILS NFR BLD: 70.2 %
NITRITE UR QL STRIP: NEGATIVE
PCO2 BLDA: 36.8 MMHG (ref 35–45)
PH SMN: 7.42 [PH] (ref 7.35–7.45)
PH UR STRIP: 5 [PH] (ref 5–8)
PLATELET # BLD AUTO: 235 K/UL
PMV BLD AUTO: 8.2 FL
PO2 BLDA: 59 MMHG (ref 40–60)
POC BE: 0 MMOL/L
POC SATURATED O2: 91 % (ref 95–100)
POC TCO2: 25 MMOL/L (ref 24–29)
POCT GLUCOSE: 244 MG/DL (ref 70–110)
POTASSIUM SERPL-SCNC: 4.2 MMOL/L
PROT SERPL-MCNC: 7.7 G/DL
PROT UR QL STRIP: ABNORMAL
RBC # BLD AUTO: 4.07 M/UL
RBC #/AREA URNS HPF: 3 /HPF (ref 0–4)
SAMPLE: ABNORMAL
SITE: ABNORMAL
SODIUM SERPL-SCNC: 138 MMOL/L
SP GR UR STRIP: 1.02 (ref 1–1.03)
SQUAMOUS #/AREA URNS HPF: 5 /HPF
TROPONIN I SERPL DL<=0.01 NG/ML-MCNC: 0.02 NG/ML
URN SPEC COLLECT METH UR: ABNORMAL
UROBILINOGEN UR STRIP-ACNC: NEGATIVE EU/DL
WBC # BLD AUTO: 11.2 K/UL
WBC #/AREA URNS HPF: >100 /HPF (ref 0–5)
WBC CLUMPS URNS QL MICRO: ABNORMAL

## 2018-02-22 PROCEDURE — 87186 SC STD MICRODIL/AGAR DIL: CPT | Mod: NTX

## 2018-02-22 PROCEDURE — 93005 ELECTROCARDIOGRAM TRACING: CPT | Mod: NTX

## 2018-02-22 PROCEDURE — 87086 URINE CULTURE/COLONY COUNT: CPT | Mod: NTX

## 2018-02-22 PROCEDURE — 63600175 PHARM REV CODE 636 W HCPCS: Mod: NTX | Performed by: EMERGENCY MEDICINE

## 2018-02-22 PROCEDURE — 81000 URINALYSIS NONAUTO W/SCOPE: CPT | Mod: NTX

## 2018-02-22 PROCEDURE — 96361 HYDRATE IV INFUSION ADD-ON: CPT | Mod: NTX

## 2018-02-22 PROCEDURE — 84484 ASSAY OF TROPONIN QUANT: CPT | Mod: NTX

## 2018-02-22 PROCEDURE — 99284 EMERGENCY DEPT VISIT MOD MDM: CPT | Mod: 25,NTX

## 2018-02-22 PROCEDURE — 80053 COMPREHEN METABOLIC PANEL: CPT | Mod: NTX

## 2018-02-22 PROCEDURE — 96365 THER/PROPH/DIAG IV INF INIT: CPT | Mod: NTX

## 2018-02-22 PROCEDURE — 25000003 PHARM REV CODE 250: Mod: NTX | Performed by: EMERGENCY MEDICINE

## 2018-02-22 PROCEDURE — 82962 GLUCOSE BLOOD TEST: CPT | Mod: NTX

## 2018-02-22 PROCEDURE — 87077 CULTURE AEROBIC IDENTIFY: CPT | Mod: NTX

## 2018-02-22 PROCEDURE — 85025 COMPLETE CBC W/AUTO DIFF WBC: CPT | Mod: NTX

## 2018-02-22 PROCEDURE — 87088 URINE BACTERIA CULTURE: CPT | Mod: NTX

## 2018-02-22 PROCEDURE — 36415 COLL VENOUS BLD VENIPUNCTURE: CPT | Mod: NTX

## 2018-02-22 PROCEDURE — 82010 KETONE BODYS QUAN: CPT | Mod: NTX

## 2018-02-22 PROCEDURE — 82803 BLOOD GASES ANY COMBINATION: CPT | Mod: NTX

## 2018-02-22 RX ORDER — AMOXICILLIN AND CLAVULANATE POTASSIUM 875; 125 MG/1; MG/1
1 TABLET, FILM COATED ORAL 2 TIMES DAILY
Qty: 14 TABLET | Refills: 0 | Status: SHIPPED | OUTPATIENT
Start: 2018-02-22 | End: 2018-03-01

## 2018-02-22 RX ORDER — ACETAMINOPHEN 500 MG
1000 TABLET ORAL
Status: COMPLETED | OUTPATIENT
Start: 2018-02-22 | End: 2018-02-22

## 2018-02-22 RX ORDER — CEFTRIAXONE 1 G/50ML
1 INJECTION, SOLUTION INTRAVENOUS
Status: COMPLETED | OUTPATIENT
Start: 2018-02-22 | End: 2018-02-22

## 2018-02-22 RX ADMIN — LIDOCAINE HYDROCHLORIDE: 20 SOLUTION ORAL; TOPICAL at 12:02

## 2018-02-22 RX ADMIN — ACETAMINOPHEN 1000 MG: 500 TABLET, FILM COATED ORAL at 12:02

## 2018-02-22 RX ADMIN — SODIUM CHLORIDE 500 ML: 0.9 INJECTION, SOLUTION INTRAVENOUS at 01:02

## 2018-02-22 RX ADMIN — CEFTRIAXONE 1 G: 1 INJECTION, SOLUTION INTRAVENOUS at 02:02

## 2018-02-22 NOTE — ED PROVIDER NOTES
"Encounter Date: 2/22/2018    SCRIBE #1 NOTE: I, Gus Mcintosh, am scribing for, and in the presence of, Dr. Russell.       History     Chief Complaint   Patient presents with    Gout     x 2 days, to right big toe    Urinary Frequency     x 3 days    Fatigue     with urinary symptoms and overall feeling ill       02/22/2018 12:25 PM     Chief complaint: Urinary frequency, Fatigue, CP      Andra Newell is a 58 y.o. female with a hx of Gout, DM, HTN and GERD who presents to the ED with complaints of gout in the right great toe, "burning" CP "same as acid reflux" , urinary frequency, and vaginal pain. She was taken off Gout medication chronic kidney disease. The CP is unchanged with eating. She denies dysuria and reports vaginal pain x 3 days. Allergens include Codeine.       The history is provided by the patient.     Review of patient's allergies indicates:   Allergen Reactions    Codeine Itching     Can take oxycodone and hydrocodone with Benadryl     Past Medical History:   Diagnosis Date    Anemia     Arthritis     Asthma     allergic airway    Depression     Diabetes mellitus     Eosinophilia     Gout     Gout     Hyperlipidemia     Hypertension     Low back pain     MRSA carrier     Obesity     Renal disorder     Rotator cuff syndrome--left     Thyroid disease     Ulnar neuropathy of right upper extremity      Past Surgical History:   Procedure Laterality Date    ACHILLES TENDON SURGERY Left May 2015    BACK SURGERY      CHOLECYSTECTOMY      COLONOSCOPY N/A 9/6/2017    Procedure: COLONOSCOPY;  Surgeon: Marisol Bryson MD;  Location: [facility];  Service: Endoscopy;  Laterality: N/A;    DIALYSIS FISTULA CREATION      HEMORRHOID SURGERY      JOINT REPLACEMENT      left    KNEE SURGERY      SHOULDER ARTHROSCOPY      SHOULDER SURGERY       Family History   Problem Relation Age of Onset    Diabetes Mother     Alzheimer's disease Mother     Thyroid disease Mother     " Hyperlipidemia Mother     Heart disease Father     Stroke Father     Diabetes Sister     Lupus Sister     Ovarian cancer Sister     Heart disease Brother      Social History   Substance Use Topics    Smoking status: Never Smoker    Smokeless tobacco: Never Used    Alcohol use No     Review of Systems   Constitutional: Positive for fatigue. Negative for fever.   HENT: Negative for sore throat.    Eyes: Negative for redness.   Respiratory: Negative for shortness of breath.    Cardiovascular: Positive for chest pain.   Gastrointestinal: Negative for nausea.   Genitourinary: Positive for frequency and vaginal pain. Negative for dysuria.   Musculoskeletal: Positive for joint swelling (R great toes). Negative for back pain.   Skin: Negative for rash.   Neurological: Negative for weakness.   Hematological: Does not bruise/bleed easily.       Physical Exam     Initial Vitals [02/22/18 1144]   BP Pulse Resp Temp SpO2   (!) 191/86 89 18 98.1 °F (36.7 °C) 97 %      MAP       121         Physical Exam    Nursing note and vitals reviewed.  Constitutional: She appears well-developed and well-nourished. She is not diaphoretic. No distress.   HENT:   Head: Normocephalic and atraumatic.   Eyes: EOM are normal. Pupils are equal, round, and reactive to light.   Neck: Normal range of motion. Neck supple.   Cardiovascular: Normal rate, regular rhythm, normal heart sounds and intact distal pulses.   No murmur heard.  Pulmonary/Chest: Breath sounds normal. No respiratory distress. She has no wheezes. She has no rhonchi. She has no rales.   Abdominal: Soft. She exhibits no distension. There is no tenderness. There is no rebound and no guarding.   Genitourinary:   Genitourinary Comments: No discharge. No erythema or swelling. No TTP to  Adnexal fullness or masses.    Musculoskeletal: Normal range of motion. She exhibits no edema or tenderness.   TTP and mild swelling noted to right great toe. No erythema, drainage, fluctuance, or  induration.    Neurological: She is alert and oriented to person, place, and time. She has normal strength.   Skin: Skin is warm. No rash noted.         ED Course   Procedures  Labs Reviewed   CBC W/ AUTO DIFFERENTIAL - Abnormal; Notable for the following:        Result Value    Hemoglobin 11.1 (*)     Hematocrit 34.1 (*)     RDW 15.7 (*)     MPV 8.2 (*)     Gran # (ANC) 7.9 (*)     Eos # 0.6 (*)     All other components within normal limits   COMPREHENSIVE METABOLIC PANEL - Abnormal; Notable for the following:     Glucose 239 (*)     BUN, Bld 73 (*)     Creatinine 3.4 (*)     Albumin 3.3 (*)     Alkaline Phosphatase 169 (*)     eGFR if  16 (*)     eGFR if non  14 (*)     All other components within normal limits   URINALYSIS - Abnormal; Notable for the following:     Appearance, UA Hazy (*)     Protein, UA 2+ (*)     Glucose, UA 1+ (*)     Occult Blood UA 1+ (*)     Leukocytes, UA 1+ (*)     All other components within normal limits   URINALYSIS MICROSCOPIC - Abnormal; Notable for the following:     WBC, UA >100 (*)     WBC Clumps, UA Many (*)     Bacteria, UA Many (*)     All other components within normal limits   POCT GLUCOSE - Abnormal; Notable for the following:     POCT Glucose 244 (*)     All other components within normal limits   ISTAT PROCEDURE - Abnormal; Notable for the following:     POC SATURATED O2 91 (*)     All other components within normal limits   CULTURE, URINE   TROPONIN I   BETA - HYDROXYBUTYRATE, SERUM     EKG Readings: (Independently Interpreted)   I interpreted this EKG myself.  Normal sinus rhythm.  Rate of 79.  Left axis deviation.  Normal intervals.  No ischemic changes.       X-Rays:   Independently Interpreted Readings:   Chest X-Ray: I interpreted this x-ray myself.  There is no cardiomegaly, infiltrate, or effusion.     Medical Decision Making:   Initial Assessment:   12:36 PM  This is an emergent evaluation. Differential dx include UTI, yeast  infection, hyperglycemia, DKA, ACS and GERD. I will treat with Tylenol, lV fluids, and GI cocktail. Lab studies, EKG, and CXR have been ordered. I will reassess.               Scribe Attestation:   Scribe #1: I performed the above scribed service and the documentation accurately describes the services I performed. I attest to the accuracy of the note.    Attending Attestation:             Attending ED Notes:   1:48 PM  The patient is noted to have a urinary tract infection.  IV Rocephin has been ordered along with a urine culture.  Troponin is negative.  EKG shows no ischemic changes.  I will discharge the patient with oral antibiotics and close follow-up.  The patient has no evidence of DKA.  Epigastric and lower chest pain resolved with a GI cocktail.  As the patient stated, this was consistent with her reflux.  I believe that this is the case.             Clinical Impression:     1. Acute cystitis without hematuria    2. Weakness    3. Abdominal pain    4. Hyperglycemia    5. Dyspepsia    6. Exacerbation of gout                               Jesse Russell MD  02/22/18 0119

## 2018-02-23 ENCOUNTER — LAB VISIT (OUTPATIENT)
Dept: LAB | Facility: HOSPITAL | Age: 58
End: 2018-02-23
Attending: INTERNAL MEDICINE
Payer: MEDICAID

## 2018-02-23 DIAGNOSIS — E24.9 HYPERCORTISOLISM: ICD-10-CM

## 2018-02-23 PROCEDURE — 82533 TOTAL CORTISOL: CPT | Mod: TXP

## 2018-02-26 LAB — BACTERIA UR CULT: NORMAL

## 2018-02-27 LAB
CORTIS 12 AM SAL-MCNC: <50 NG/DL
CORTIS 12 AM SAL-MCNC: <50 NG/DL
CORTIS 8 AM SAL-MCNC: NORMAL NG/ML
CORTIS 8 AM SAL-MCNC: NORMAL NG/ML
CORTIS 8 PM SAL-MCNC: NORMAL NG/ML
CORTIS 8 PM SAL-MCNC: NORMAL NG/ML
CORTIS SAL-MCNC: NORMAL UG/DL
CORTIS SAL-MCNC: NORMAL UG/DL

## 2018-03-05 ENCOUNTER — LAB VISIT (OUTPATIENT)
Dept: LAB | Facility: HOSPITAL | Age: 58
End: 2018-03-05
Attending: NURSE PRACTITIONER
Payer: MEDICAID

## 2018-03-05 DIAGNOSIS — Z76.82 AWAITING ORGAN TRANSPLANT STATUS: ICD-10-CM

## 2018-03-05 PROCEDURE — 99001 SPECIMEN HANDLING PT-LAB: CPT | Mod: PO,TXP

## 2018-03-16 ENCOUNTER — PATIENT MESSAGE (OUTPATIENT)
Dept: SPORTS MEDICINE | Facility: CLINIC | Age: 58
End: 2018-03-16

## 2018-04-02 ENCOUNTER — HOSPITAL ENCOUNTER (OUTPATIENT)
Facility: HOSPITAL | Age: 58
Discharge: HOME | End: 2018-04-04
Attending: EMERGENCY MEDICINE | Admitting: INTERNAL MEDICINE
Payer: MEDICAID

## 2018-04-02 ENCOUNTER — LAB VISIT (OUTPATIENT)
Dept: LAB | Facility: HOSPITAL | Age: 58
End: 2018-04-02
Attending: NURSE PRACTITIONER
Payer: COMMERCIAL

## 2018-04-02 DIAGNOSIS — E03.9 HYPOTHYROIDISM, UNSPECIFIED TYPE: ICD-10-CM

## 2018-04-02 DIAGNOSIS — R30.0 DYSURIA: ICD-10-CM

## 2018-04-02 DIAGNOSIS — R06.09 EXERTIONAL DYSPNEA: ICD-10-CM

## 2018-04-02 DIAGNOSIS — I10 ESSENTIAL HYPERTENSION: ICD-10-CM

## 2018-04-02 DIAGNOSIS — N18.6 ESRD (END STAGE RENAL DISEASE): ICD-10-CM

## 2018-04-02 DIAGNOSIS — R07.9 CHEST PAIN, UNSPECIFIED TYPE: ICD-10-CM

## 2018-04-02 DIAGNOSIS — R07.9 CHEST PAIN: ICD-10-CM

## 2018-04-02 DIAGNOSIS — Z76.82 AWAITING ORGAN TRANSPLANT STATUS: ICD-10-CM

## 2018-04-02 DIAGNOSIS — E83.52 HYPERCALCEMIA: Primary | ICD-10-CM

## 2018-04-02 PROBLEM — D63.1 ANEMIA IN STAGE 5 CHRONIC KIDNEY DISEASE, NOT ON CHRONIC DIALYSIS: Status: ACTIVE | Noted: 2017-07-12

## 2018-04-02 PROBLEM — N18.5 ANEMIA IN STAGE 5 CHRONIC KIDNEY DISEASE, NOT ON CHRONIC DIALYSIS: Status: ACTIVE | Noted: 2017-07-12

## 2018-04-02 LAB
ALBUMIN SERPL BCP-MCNC: 3.4 G/DL
ALP SERPL-CCNC: 179 U/L
ALT SERPL W/O P-5'-P-CCNC: 18 U/L
ANION GAP SERPL CALC-SCNC: 13 MMOL/L
AST SERPL-CCNC: 17 U/L
BACTERIA #/AREA URNS HPF: NORMAL /HPF
BASOPHILS # BLD AUTO: 0.1 K/UL
BASOPHILS NFR BLD: 0.6 %
BILIRUB SERPL-MCNC: 0.3 MG/DL
BILIRUB UR QL STRIP: NEGATIVE
BNP SERPL-MCNC: 22 PG/ML
BUN SERPL-MCNC: 65 MG/DL
CALCIUM SERPL-MCNC: 10.9 MG/DL
CHLORIDE SERPL-SCNC: 105 MMOL/L
CLARITY UR: CLEAR
CO2 SERPL-SCNC: 23 MMOL/L
COLOR UR: YELLOW
CREAT SERPL-MCNC: 3.6 MG/DL
DIFFERENTIAL METHOD: ABNORMAL
EOSINOPHIL # BLD AUTO: 0.6 K/UL
EOSINOPHIL NFR BLD: 5 %
ERYTHROCYTE [DISTWIDTH] IN BLOOD BY AUTOMATED COUNT: 16.5 %
EST. GFR  (AFRICAN AMERICAN): 15 ML/MIN/1.73 M^2
EST. GFR  (NON AFRICAN AMERICAN): 13 ML/MIN/1.73 M^2
GLUCOSE SERPL-MCNC: 142 MG/DL
GLUCOSE UR QL STRIP: NEGATIVE
HCT VFR BLD AUTO: 31.9 %
HGB BLD-MCNC: 10.6 G/DL
HGB UR QL STRIP: ABNORMAL
HYALINE CASTS #/AREA URNS LPF: 1 /LPF
KETONES UR QL STRIP: NEGATIVE
LEUKOCYTE ESTERASE UR QL STRIP: ABNORMAL
LYMPHOCYTES # BLD AUTO: 2.8 K/UL
LYMPHOCYTES NFR BLD: 23.8 %
MCH RBC QN AUTO: 27.7 PG
MCHC RBC AUTO-ENTMCNC: 33.1 G/DL
MCV RBC AUTO: 84 FL
MICROSCOPIC COMMENT: NORMAL
MONOCYTES # BLD AUTO: 1 K/UL
MONOCYTES NFR BLD: 8.3 %
NEUTROPHILS # BLD AUTO: 7.4 K/UL
NEUTROPHILS NFR BLD: 62.3 %
NITRITE UR QL STRIP: NEGATIVE
PH UR STRIP: 6 [PH] (ref 5–8)
PLATELET # BLD AUTO: 222 K/UL
PMV BLD AUTO: 8.4 FL
POCT GLUCOSE: 94 MG/DL (ref 70–110)
POTASSIUM SERPL-SCNC: 3.9 MMOL/L
PROT SERPL-MCNC: 7.6 G/DL
PROT UR QL STRIP: ABNORMAL
RBC # BLD AUTO: 3.81 M/UL
RBC #/AREA URNS HPF: 0 /HPF (ref 0–4)
SODIUM SERPL-SCNC: 141 MMOL/L
SP GR UR STRIP: 1.01 (ref 1–1.03)
SQUAMOUS #/AREA URNS HPF: 2 /HPF
TROPONIN I SERPL DL<=0.01 NG/ML-MCNC: 0.01 NG/ML
TROPONIN I SERPL DL<=0.01 NG/ML-MCNC: 0.02 NG/ML
URN SPEC COLLECT METH UR: ABNORMAL
UROBILINOGEN UR STRIP-ACNC: NEGATIVE EU/DL
WBC # BLD AUTO: 11.9 K/UL
WBC #/AREA URNS HPF: 1 /HPF (ref 0–5)

## 2018-04-02 PROCEDURE — 80053 COMPREHEN METABOLIC PANEL: CPT | Mod: NTX

## 2018-04-02 PROCEDURE — 85025 COMPLETE CBC W/AUTO DIFF WBC: CPT | Mod: NTX

## 2018-04-02 PROCEDURE — 63600175 PHARM REV CODE 636 W HCPCS: Mod: NTX | Performed by: EMERGENCY MEDICINE

## 2018-04-02 PROCEDURE — 99001 SPECIMEN HANDLING PT-LAB: CPT | Mod: PO,TXP

## 2018-04-02 PROCEDURE — 96372 THER/PROPH/DIAG INJ SC/IM: CPT | Mod: NTX

## 2018-04-02 PROCEDURE — 25000003 PHARM REV CODE 250: Mod: NTX | Performed by: EMERGENCY MEDICINE

## 2018-04-02 PROCEDURE — 82962 GLUCOSE BLOOD TEST: CPT | Mod: NTX

## 2018-04-02 PROCEDURE — 83880 ASSAY OF NATRIURETIC PEPTIDE: CPT | Mod: NTX

## 2018-04-02 PROCEDURE — 84484 ASSAY OF TROPONIN QUANT: CPT | Mod: 91,NTX

## 2018-04-02 PROCEDURE — 81000 URINALYSIS NONAUTO W/SCOPE: CPT | Mod: NTX

## 2018-04-02 PROCEDURE — 36415 COLL VENOUS BLD VENIPUNCTURE: CPT | Mod: NTX

## 2018-04-02 PROCEDURE — G0378 HOSPITAL OBSERVATION PER HR: HCPCS | Mod: NTX

## 2018-04-02 PROCEDURE — 87086 URINE CULTURE/COLONY COUNT: CPT | Mod: NTX

## 2018-04-02 PROCEDURE — 83036 HEMOGLOBIN GLYCOSYLATED A1C: CPT | Mod: NTX

## 2018-04-02 PROCEDURE — 99220 PR INITIAL OBSERVATION CARE,LEVL III: CPT | Mod: NTX,,, | Performed by: INTERNAL MEDICINE

## 2018-04-02 PROCEDURE — 99284 EMERGENCY DEPT VISIT MOD MDM: CPT | Mod: NTX,25

## 2018-04-02 RX ORDER — CETIRIZINE HYDROCHLORIDE 10 MG/1
10 TABLET ORAL DAILY
Status: DISCONTINUED | OUTPATIENT
Start: 2018-04-03 | End: 2018-04-04 | Stop reason: HOSPADM

## 2018-04-02 RX ORDER — ATORVASTATIN CALCIUM 20 MG/1
20 TABLET, FILM COATED ORAL DAILY
Status: DISCONTINUED | OUTPATIENT
Start: 2018-04-03 | End: 2018-04-04 | Stop reason: HOSPADM

## 2018-04-02 RX ORDER — CALCITRIOL 0.25 UG/1
0.5 CAPSULE ORAL DAILY
Status: DISCONTINUED | OUTPATIENT
Start: 2018-04-03 | End: 2018-04-02

## 2018-04-02 RX ORDER — AMOXICILLIN 250 MG
1 CAPSULE ORAL 2 TIMES DAILY PRN
Status: DISCONTINUED | OUTPATIENT
Start: 2018-04-02 | End: 2018-04-04 | Stop reason: HOSPADM

## 2018-04-02 RX ORDER — GLUCAGON 1 MG
1 KIT INJECTION
Status: DISCONTINUED | OUTPATIENT
Start: 2018-04-02 | End: 2018-04-04 | Stop reason: HOSPADM

## 2018-04-02 RX ORDER — LORAZEPAM 1 MG/1
1 TABLET ORAL 2 TIMES DAILY
Status: DISCONTINUED | OUTPATIENT
Start: 2018-04-02 | End: 2018-04-04 | Stop reason: HOSPADM

## 2018-04-02 RX ORDER — SODIUM CHLORIDE 9 MG/ML
INJECTION, SOLUTION INTRAVENOUS CONTINUOUS
Status: DISCONTINUED | OUTPATIENT
Start: 2018-04-03 | End: 2018-04-04

## 2018-04-02 RX ORDER — VILAZODONE HYDROCHLORIDE 10 MG/1
40 TABLET ORAL DAILY
Status: DISCONTINUED | OUTPATIENT
Start: 2018-04-03 | End: 2018-04-04 | Stop reason: HOSPADM

## 2018-04-02 RX ORDER — FAMOTIDINE 20 MG/1
20 TABLET, FILM COATED ORAL DAILY
Status: DISCONTINUED | OUTPATIENT
Start: 2018-04-03 | End: 2018-04-04 | Stop reason: HOSPADM

## 2018-04-02 RX ORDER — PANTOPRAZOLE SODIUM 40 MG/1
40 TABLET, DELAYED RELEASE ORAL DAILY
Status: DISCONTINUED | OUTPATIENT
Start: 2018-04-03 | End: 2018-04-04 | Stop reason: HOSPADM

## 2018-04-02 RX ORDER — SODIUM CHLORIDE 9 MG/ML
INJECTION, SOLUTION INTRAVENOUS CONTINUOUS
Status: DISCONTINUED | OUTPATIENT
Start: 2018-04-03 | End: 2018-04-02

## 2018-04-02 RX ORDER — ASPIRIN 325 MG
325 TABLET ORAL
Status: COMPLETED | OUTPATIENT
Start: 2018-04-02 | End: 2018-04-02

## 2018-04-02 RX ORDER — HEPARIN SODIUM 5000 [USP'U]/ML
5000 INJECTION, SOLUTION INTRAVENOUS; SUBCUTANEOUS EVERY 8 HOURS
Status: DISCONTINUED | OUTPATIENT
Start: 2018-04-02 | End: 2018-04-04 | Stop reason: HOSPADM

## 2018-04-02 RX ORDER — INSULIN ASPART 100 [IU]/ML
0-5 INJECTION, SOLUTION INTRAVENOUS; SUBCUTANEOUS EVERY 6 HOURS PRN
Status: DISCONTINUED | OUTPATIENT
Start: 2018-04-02 | End: 2018-04-04 | Stop reason: HOSPADM

## 2018-04-02 RX ORDER — ACETAMINOPHEN 325 MG/1
650 TABLET ORAL EVERY 6 HOURS PRN
Status: DISCONTINUED | OUTPATIENT
Start: 2018-04-02 | End: 2018-04-04 | Stop reason: HOSPADM

## 2018-04-02 RX ORDER — SODIUM BICARBONATE 650 MG/1
650 TABLET ORAL DAILY
Status: DISCONTINUED | OUTPATIENT
Start: 2018-04-03 | End: 2018-04-04 | Stop reason: HOSPADM

## 2018-04-02 RX ORDER — AMLODIPINE BESYLATE 5 MG/1
10 TABLET ORAL DAILY
Status: DISCONTINUED | OUTPATIENT
Start: 2018-04-03 | End: 2018-04-04 | Stop reason: HOSPADM

## 2018-04-02 RX ORDER — HYDRALAZINE HYDROCHLORIDE 20 MG/ML
10 INJECTION INTRAMUSCULAR; INTRAVENOUS EVERY 6 HOURS PRN
Status: DISCONTINUED | OUTPATIENT
Start: 2018-04-03 | End: 2018-04-04 | Stop reason: HOSPADM

## 2018-04-02 RX ORDER — FUROSEMIDE 40 MG/1
40 TABLET ORAL DAILY
Status: DISCONTINUED | OUTPATIENT
Start: 2018-04-03 | End: 2018-04-04 | Stop reason: HOSPADM

## 2018-04-02 RX ADMIN — LORAZEPAM 1 MG: 1 TABLET ORAL at 09:04

## 2018-04-02 RX ADMIN — HYDRALAZINE HYDROCHLORIDE 10 MG: 20 INJECTION INTRAMUSCULAR; INTRAVENOUS at 11:04

## 2018-04-02 RX ADMIN — ASPIRIN 325 MG ORAL TABLET 325 MG: 325 PILL ORAL at 06:04

## 2018-04-02 RX ADMIN — LIDOCAINE HYDROCHLORIDE: 20 SOLUTION ORAL; TOPICAL at 06:04

## 2018-04-02 RX ADMIN — HEPARIN SODIUM 5000 UNITS: 5000 INJECTION, SOLUTION INTRAVENOUS; SUBCUTANEOUS at 09:04

## 2018-04-02 NOTE — ED PROVIDER NOTES
Encounter Date: 4/2/2018    SCRIBE #1 NOTE: I, Yonny Parish, am scribing for, and in the presence of, Dr. Lopez .       History     Chief Complaint   Patient presents with    Chest Pain     off and on since this am.       04/02/2018 5:42 PM     Chief complaint: chest pain       Andra Newell is a 58 y.o. female with a PMHx of renal disorder, arthritis, Dm, and HTN who presents to the ED with intermittent chest pain that started this am. She states that the pain is non-radiating and mid-sternal. She admits that this pain is sharp pain with an heaviness. She endorses having this chest pain previously for the last several months, but not as sharp or severe today. Patient states that she currently has some mild chest pain, stating that it is not as severe. She does admit to some SOB with this pain.Patient states that she has had a headache with this chest pain. She endorses generalized weakness today, especially while ambulating. Patient states that she has been having a lot of dysuria. She endorses flank pain as well that is dull and achy worse with movement, stating that the right side is worse than the left. Patient endorses nausea with this pain. She relays that she has a history of frequent UTI's and is currently awaiting a kidney transplant. Patient denies fever, vomiting, diarrhea, abdominal pain, and cough.          The history is provided by the patient.     Review of patient's allergies indicates:   Allergen Reactions    Codeine Itching     Can take oxycodone and hydrocodone with Benadryl     Past Medical History:   Diagnosis Date    Anemia     Arthritis     Asthma     allergic airway    Depression     Diabetes mellitus     Eosinophilia     Gout     Gout     Hyperlipidemia     Hypertension     Low back pain     MRSA carrier     Obesity     Renal disorder     Rotator cuff syndrome--left     Thyroid disease     Ulnar neuropathy of right upper extremity      Past Surgical History:    Procedure Laterality Date    ACHILLES TENDON SURGERY Left May 2015    BACK SURGERY      CHOLECYSTECTOMY      COLONOSCOPY N/A 9/6/2017    Procedure: COLONOSCOPY;  Surgeon: Marisol Bryson MD;  Location: [facility];  Service: Endoscopy;  Laterality: N/A;    DIALYSIS FISTULA CREATION      HEMORRHOID SURGERY      JOINT REPLACEMENT      left    KNEE SURGERY      SHOULDER ARTHROSCOPY      SHOULDER SURGERY       Family History   Problem Relation Age of Onset    Diabetes Mother     Alzheimer's disease Mother     Thyroid disease Mother     Hyperlipidemia Mother     Heart disease Father     Stroke Father     Diabetes Sister     Lupus Sister     Ovarian cancer Sister     Heart disease Brother      Social History   Substance Use Topics    Smoking status: Never Smoker    Smokeless tobacco: Never Used    Alcohol use No     Review of Systems   All other systems reviewed and are negative.  REVIEW OF SYSTEMS:  CONSTITUTIONAL: no fevers, chills, appetite changes, weight changes. +generalized weakness   ENT: no sore throat, congestion, hearing deficitis, or ear pain  EYES: no blurred vision, eye pain,   Neck: no pain, stiffness   CV: +Chest pain. No edema, palpitations, or chest wall pain  RESP: +shortness of breath. No wheezing, dyspnea on exertion, orthopnea, or cough  GI: no abdominal pain, vomiting, diarrhea, or bloody stools. +nausea   : +dysuria and flank pain . No hematuria or discharge,   MUSCULOSKELETAL: no myalgias, arthralgia, back pain, or difficulty walking  SKIN: no rashes or lesions  NEURO: no numbness, weakness, headaches, syncope      Physical Exam     Initial Vitals [04/02/18 1734]   BP Pulse Resp Temp SpO2   (!) 157/74 92 14 98.7 °F (37.1 °C) 99 %      MAP       101.67         Physical Exam    Nursing note and vitals reviewed.  Constitutional: She appears well-developed.   HENT:   Head: Normocephalic and atraumatic.   Eyes: EOM are normal. Pupils are equal, round, and reactive to light.    Neck: Neck supple.   Cardiovascular: Normal rate, regular rhythm and intact distal pulses. Exam reveals no gallop and no friction rub.    Murmur heard.   Systolic murmur is present with a grade of 3/6   Pulses:       Radial pulses are 2+ on the right side, and 2+ on the left side.   Pulmonary/Chest: Breath sounds normal. No respiratory distress. She has no decreased breath sounds. She has no wheezes. She has no rhonchi. She has no rales. She exhibits tenderness (anterior chest wall ).   Abdominal: Soft. Bowel sounds are normal. There is CVA tenderness (right ).   Musculoskeletal: Normal range of motion.   Palpable thrill left A/C   Neurological: She is alert and oriented to person, place, and time.   Skin: Skin is warm and dry.   Various sites of varying ecchymosis to the on the abdomen from injection sites.   Old scar on the lumbar region.    Psychiatric: She has a normal mood and affect.         ED Course   Procedures  Labs Reviewed - No data to display  EKG Readings: (Independently Interpreted)   Rhythm: Normal Sinus Rhythm. Heart Rate: 84.   84 NSR, left axis deviation, LVH, and no ST segment elevation.     Imaging Results    None            Medical Decision Making:   History:   Old Medical Records: I decided to obtain old medical records.  Clinical Tests:   Lab Tests: Ordered and Reviewed  Radiological Study: Ordered and Reviewed  Medical Tests: Ordered and Reviewed  ED Management:  Symptoms of generalized weakness could be exacerbated with hypercalcemia in the setting of renal disease. Does not appear to have an UTI, but lower abdominal discomfort could be from hypercalcemia   Chest pain unchanged with GI cocktail   Seen here 2 months ago for similar chest and recommend that we admit for further evaluation of the chest pain.   Heart score of 4   Chest x-ray shows no acute pulmonary process                  Scribe Attestation:   Scribe #1: I performed the above scribed service and the documentation accurately  describes the services I performed. I attest to the accuracy of the note.    I, Dr. Yonny Lopez personally performed the services described in this documentation. All medical record entries made by the scribe were at my direction and in my presence.  I have reviewed the chart and agree that the record reflects my personal performance and is accurate and complete. Yonny Lopez MD.  10:56 PM 04/04/2018    DISCLAIMER: This note was prepared with Dragon NaturallySpeaking voice recognition transcription software. Garbled syntax, mangled pronouns, and other bizarre constructions may be attributed to that software system            Clinical Impression:   The primary encounter diagnosis was Hypercalcemia. Diagnoses of Chest pain, ESRD (end stage renal disease), Exertional dyspnea, Uncontrolled type 2 diabetes mellitus with stage 5 chronic kidney disease not on chronic dialysis, with long-term current use of insulin, Hypothyroidism, unspecified type, Essential hypertension, Chest pain, unspecified type, and Dysuria were also pertinent to this visit.    Disposition:   Disposition: Placed in Observation                        Yonny Lopez MD  04/04/18 1239

## 2018-04-03 LAB
25(OH)D3+25(OH)D2 SERPL-MCNC: 12 NG/ML
ALBUMIN SERPL BCP-MCNC: 3.2 G/DL
ALP SERPL-CCNC: 151 U/L
ALT SERPL W/O P-5'-P-CCNC: 18 U/L
ANION GAP SERPL CALC-SCNC: 13 MMOL/L
AST SERPL-CCNC: 15 U/L
BASOPHILS # BLD AUTO: 0.1 K/UL
BASOPHILS NFR BLD: 0.5 %
BILIRUB SERPL-MCNC: 0.3 MG/DL
BUN SERPL-MCNC: 65 MG/DL
CALCIUM SERPL-MCNC: 10.8 MG/DL
CHLORIDE SERPL-SCNC: 106 MMOL/L
CO2 SERPL-SCNC: 24 MMOL/L
CREAT SERPL-MCNC: 3.5 MG/DL
DIFFERENTIAL METHOD: ABNORMAL
EOSINOPHIL # BLD AUTO: 0.5 K/UL
EOSINOPHIL NFR BLD: 5 %
ERYTHROCYTE [DISTWIDTH] IN BLOOD BY AUTOMATED COUNT: 16.4 %
EST. GFR  (AFRICAN AMERICAN): 16 ML/MIN/1.73 M^2
EST. GFR  (NON AFRICAN AMERICAN): 13 ML/MIN/1.73 M^2
ESTIMATED AVG GLUCOSE: 183 MG/DL
GLUCOSE SERPL-MCNC: 199 MG/DL
HBA1C MFR BLD HPLC: 8 %
HCT VFR BLD AUTO: 31.2 %
HGB BLD-MCNC: 10.4 G/DL
LYMPHOCYTES # BLD AUTO: 2.9 K/UL
LYMPHOCYTES NFR BLD: 27.9 %
MCH RBC QN AUTO: 27.9 PG
MCHC RBC AUTO-ENTMCNC: 33.2 G/DL
MCV RBC AUTO: 84 FL
MONOCYTES # BLD AUTO: 0.7 K/UL
MONOCYTES NFR BLD: 7.2 %
NEUTROPHILS # BLD AUTO: 6.1 K/UL
NEUTROPHILS NFR BLD: 59.4 %
PHOSPHATE SERPL-MCNC: 4 MG/DL
PLATELET # BLD AUTO: 200 K/UL
PMV BLD AUTO: 8.2 FL
POCT GLUCOSE: 103 MG/DL (ref 70–110)
POCT GLUCOSE: 135 MG/DL (ref 70–110)
POCT GLUCOSE: 195 MG/DL (ref 70–110)
POTASSIUM SERPL-SCNC: 3.6 MMOL/L
PROT SERPL-MCNC: 6.9 G/DL
PTH-INTACT SERPL-MCNC: 25.1 PG/ML
RBC # BLD AUTO: 3.72 M/UL
SODIUM SERPL-SCNC: 143 MMOL/L
TROPONIN I SERPL DL<=0.01 NG/ML-MCNC: 0.01 NG/ML
TROPONIN I SERPL DL<=0.01 NG/ML-MCNC: 0.02 NG/ML
WBC # BLD AUTO: 10.3 K/UL

## 2018-04-03 PROCEDURE — 99204 OFFICE O/P NEW MOD 45 MIN: CPT | Mod: NTX,,, | Performed by: INTERNAL MEDICINE

## 2018-04-03 PROCEDURE — 25000003 PHARM REV CODE 250: Mod: NTX | Performed by: EMERGENCY MEDICINE

## 2018-04-03 PROCEDURE — 25000003 PHARM REV CODE 250: Mod: NTX | Performed by: INTERNAL MEDICINE

## 2018-04-03 PROCEDURE — 83970 ASSAY OF PARATHORMONE: CPT | Mod: NTX

## 2018-04-03 PROCEDURE — 63600175 PHARM REV CODE 636 W HCPCS: Mod: NTX | Performed by: NURSE PRACTITIONER

## 2018-04-03 PROCEDURE — 80053 COMPREHEN METABOLIC PANEL: CPT | Mod: NTX

## 2018-04-03 PROCEDURE — 85025 COMPLETE CBC W/AUTO DIFF WBC: CPT | Mod: NTX

## 2018-04-03 PROCEDURE — 97162 PT EVAL MOD COMPLEX 30 MIN: CPT | Mod: NTX

## 2018-04-03 PROCEDURE — 25000003 PHARM REV CODE 250: Mod: NTX | Performed by: NURSE PRACTITIONER

## 2018-04-03 PROCEDURE — 36415 COLL VENOUS BLD VENIPUNCTURE: CPT | Mod: NTX

## 2018-04-03 PROCEDURE — G8978 MOBILITY CURRENT STATUS: HCPCS | Mod: CM,NTX

## 2018-04-03 PROCEDURE — 84100 ASSAY OF PHOSPHORUS: CPT | Mod: NTX

## 2018-04-03 PROCEDURE — 63600175 PHARM REV CODE 636 W HCPCS: Mod: NTX | Performed by: EMERGENCY MEDICINE

## 2018-04-03 PROCEDURE — 84484 ASSAY OF TROPONIN QUANT: CPT | Mod: NTX

## 2018-04-03 PROCEDURE — G0378 HOSPITAL OBSERVATION PER HR: HCPCS | Mod: NTX

## 2018-04-03 PROCEDURE — 25500020 PHARM REV CODE 255: Mod: NTX | Performed by: NURSE PRACTITIONER

## 2018-04-03 PROCEDURE — 96361 HYDRATE IV INFUSION ADD-ON: CPT | Mod: NTX

## 2018-04-03 PROCEDURE — 97116 GAIT TRAINING THERAPY: CPT | Mod: NTX

## 2018-04-03 PROCEDURE — 82306 VITAMIN D 25 HYDROXY: CPT | Mod: NTX

## 2018-04-03 PROCEDURE — 96372 THER/PROPH/DIAG INJ SC/IM: CPT | Mod: 59

## 2018-04-03 PROCEDURE — 99225 PR SUBSEQUENT OBSERVATION CARE,LEVEL II: CPT | Mod: NTX,,, | Performed by: INTERNAL MEDICINE

## 2018-04-03 PROCEDURE — 84484 ASSAY OF TROPONIN QUANT: CPT | Mod: 91,NTX

## 2018-04-03 PROCEDURE — G8979 MOBILITY GOAL STATUS: HCPCS | Mod: CJ,NTX

## 2018-04-03 PROCEDURE — 96360 HYDRATION IV INFUSION INIT: CPT | Mod: NTX

## 2018-04-03 RX ORDER — HYDRALAZINE HYDROCHLORIDE 10 MG/1
10 TABLET, FILM COATED ORAL EVERY 8 HOURS
Status: DISCONTINUED | OUTPATIENT
Start: 2018-04-03 | End: 2018-04-04

## 2018-04-03 RX ORDER — POTASSIUM CHLORIDE 20 MEQ/1
40 TABLET, EXTENDED RELEASE ORAL ONCE
Status: DISCONTINUED | OUTPATIENT
Start: 2018-04-03 | End: 2018-04-04

## 2018-04-03 RX ADMIN — CETIRIZINE HYDROCHLORIDE 10 MG: 10 TABLET, FILM COATED ORAL at 11:04

## 2018-04-03 RX ADMIN — FAMOTIDINE 20 MG: 20 TABLET, FILM COATED ORAL at 11:04

## 2018-04-03 RX ADMIN — SODIUM CHLORIDE: 0.9 INJECTION, SOLUTION INTRAVENOUS at 11:04

## 2018-04-03 RX ADMIN — LEVOTHYROXINE SODIUM 75 MCG: 25 TABLET ORAL at 06:04

## 2018-04-03 RX ADMIN — SODIUM CHLORIDE: 0.9 INJECTION, SOLUTION INTRAVENOUS at 12:04

## 2018-04-03 RX ADMIN — LORAZEPAM 1 MG: 1 TABLET ORAL at 11:04

## 2018-04-03 RX ADMIN — VILAZODONE HYDROCHLORIDE 40 MG: 10 TABLET ORAL at 04:04

## 2018-04-03 RX ADMIN — AMLODIPINE BESYLATE 10 MG: 5 TABLET ORAL at 11:04

## 2018-04-03 RX ADMIN — HYDRALAZINE HYDROCHLORIDE 10 MG: 10 TABLET, FILM COATED ORAL at 02:04

## 2018-04-03 RX ADMIN — SULFUR HEXAFLUORIDE 2.4 ML: KIT at 12:04

## 2018-04-03 RX ADMIN — ACETAMINOPHEN 650 MG: 325 TABLET ORAL at 11:04

## 2018-04-03 RX ADMIN — ATORVASTATIN CALCIUM 20 MG: 20 TABLET, FILM COATED ORAL at 11:04

## 2018-04-03 RX ADMIN — HEPARIN SODIUM 5000 UNITS: 5000 INJECTION, SOLUTION INTRAVENOUS; SUBCUTANEOUS at 02:04

## 2018-04-03 RX ADMIN — SODIUM BICARBONATE 650 MG TABLET 650 MG: at 11:04

## 2018-04-03 RX ADMIN — FUROSEMIDE 40 MG: 40 TABLET ORAL at 11:04

## 2018-04-03 RX ADMIN — HYDRALAZINE HYDROCHLORIDE 10 MG: 10 TABLET, FILM COATED ORAL at 08:04

## 2018-04-03 RX ADMIN — LORAZEPAM 1 MG: 1 TABLET ORAL at 08:04

## 2018-04-03 RX ADMIN — PANTOPRAZOLE SODIUM 40 MG: 40 TABLET, DELAYED RELEASE ORAL at 11:04

## 2018-04-03 NOTE — PROGRESS NOTES
Progress Note  Hospital Medicine  Patient Name:Andra Newell  MRN:  [id number]  Patient Class: OP- Observation  Admit Date: 4/2/2018  Length of Stay: 0 days  Expected Discharge Date:   Attending Physician: Patrick Jordan MD  Primary Care Provider:  Diony Alvarado MD    SUBJECTIVE:     Principal Problem: Chest pain  Initial history of present illness: Ms. Newell is a 57yo WF with a PMH HTN, DM2, Hypothyroidism, Depression, Gout, Interstitial Nephritis, and ESRD awaiting HD and renal transplant. She had a dialysis graft placed 6 months ago, but has not started dialysis. Dr. Heaton is her nephrologist. She presents to the ED with c/o Chest pain. It started this morning and has been intermittent, mid-sternal, and non-radiating. It's sharp with a heaviness. She reports having chest pain intermittently for a few months, but not this bad and it does not feel like her influx. She reports having a stress test for renal transplant and it was normal. Associated symptoms include SOB, HA, nausea, and generalized weakness. The SOB occurs with exertion and is getting worst. Today, her legs were very weak and felt like jelly. The HA and nausea are chronic for her. She has also been having dysuria with bilateral flank and lower abdominal. The pain is a dull ache and worst with movement. She was last treated for a UTI 2 months ago and she has plenty burning when she urinates. While in the ED, her Ca was high at 10.9 and she was bolused with IVF. Her troponin and UA were unremarkable. A KUB was also done. She currently denies pain or discomforts.    PMH/PSH/SH/FH/Meds: reviewed.    Symptoms/Review of Systems: Patient reports improving chest pain. Off and on gets flank pain. Reports generalized weakness. No shortness of breath, cough, chest pain or headache, fever or abdominal pain.     Diet:  Adequate intake.    Activity level: Normal.    Pain:  As above    OBJECTIVE:   Vital Signs (Most Recent):      Temp: 96.4 °F (35.8 °C)  (04/03/18 0719)  Pulse: 80 (04/03/18 0719)  Resp: 16 (04/03/18 0719)  BP: (!) 176/78 (04/03/18 0719)  SpO2: 96 % (04/03/18 0719)       Vital Signs Range (Last 24H):  Temp:  [96.4 °F (35.8 °C)-98.7 °F (37.1 °C)]   Pulse:  [71-92]   Resp:  [14-20]   BP: (150-182)/(66-83)   SpO2:  [93 %-99 %]     Weight: 106.6 kg (235 lb)  Body mass index is 36.81 kg/m².  No intake or output data in the 24 hours ending 04/03/18 1022     Physical Examination:  Constitutional: She is oriented to person, place, and time. No distress.   HENT:   Head: Normocephalic.   Eyes: Pupils are equal, round, and reactive to light.   Neck: Normal range of motion. Neck supple. No JVD present. No tracheal deviation present.   Cardiovascular: Normal rate, regular rhythm and intact distal pulses.    Murmur heard.  NSR HR 68 on telemetry   Pulmonary/Chest: Effort normal and breath sounds normal. No respiratory distress.   Abdominal: Soft. Bowel sounds are normal. She exhibits no distension. There is no tenderness.   Musculoskeletal: Normal range of motion. She exhibits no edema.   Left AC Dialysis Fistula with (+) thrill and bruit.   Neurological: She is alert and oriented to person, place, and time. No cranial nerve deficit.   Skin: Skin is warm, dry and intact. Capillary refill takes less than 2 seconds.   Psychiatric: She has a normal mood and affect. Her behavior is normal. Judgment and thought content normal.    CRANIAL NERVES   CN III, IV, VI   Pupils are equal, round, and reactive to light.    CBC:    Recent Labs  Lab 04/02/18  1800 04/03/18  0406   WBC 11.90 10.30   RBC 3.81* 3.72*   HGB 10.6* 10.4*   HCT 31.9* 31.2*    200   MCV 84 84   MCH 27.7 27.9   MCHC 33.1 33.2   BMP    Recent Labs  Lab 04/02/18  1800 04/03/18  0406   * 199*    143   K 3.9 3.6    106   CO2 23 24   BUN 65* 65*   CREATININE 3.6* 3.5*   CALCIUM 10.9* 10.8*      Diagnostic Results:  Microbiology Results (last 7 days)     Procedure Component Value Units  Date/Time    Urine culture [[id number]] Collected:  04/02/18 1823    Order Status:  Sent Specimen:  Urine from Urine, Clean Catch Updated:  04/03/18 0000         Last Dobutamine Stress ECHO Test 7/26/17: CONCLUSIONS     1 - Concentric remodeling.     2 - No wall motion abnormalities.     3 - Normal left ventricular systolic function (EF 60-65%).     4 - Normal left ventricular diastolic function.     5 - Right ventricular enlargement with normal systolic function.     6 - Pulmonary hypertension. The estimated PA systolic pressure is 41 mmHg.     7 - Difficult windows, Optison contrast used for wall motion analysis.     No evidence of stress induced myocardial ischemia.     KUB: No acute findings.    CXR: Mild right infrahilar stranding without consolidation    Assessment/Plan:     * Chest pain     Troponin (-)--Continue to trend  Monitor on telemetry  Continue chronic statin  Not on chronic ASA  NPO after midnight.       Exertional dyspnea     Obtain echocardiogram  Obtain Pulse Oximetry at rest and exercise  O2 prn       Hypercalcemia     Likley contributing to weakness. Check iPTH, Phos and Ca.  Bolused with IVF in ED  Hold Calcitriol       Dysuria     UA (-) UTI  Culture urine  Pyridium contraindicated.       Essential hypertension     Chronic/Mildly uncontrolled  Continue chronic medications, adjusting as needed  Monitor VS  IV Hydralazine prn         HLD (hyperlipidemia)     Continue chronic statin  Not on chronic ASA       Anemia in stage 5 chronic kidney disease, not on chronic dialysis     Stable  Monitor labs  Transfuse if Hgb <7.0 or symptomatic          ESRD (end stage renal disease)     Consult Nephrology  Renal diet  Renal dose medications  No NSAIDs/Nephrotoxic agents  Monitor labs        Hypothyroidism     Chronic. Continue home levothyroxine.          Uncontrolled type 2 diabetes mellitus with stage 5 chronic kidney disease not on chronic dialysis, with long-term current use of insulin     Continue  home insulin regimen  Monitor blood sugars QAC&HS  SSi prn      VTE Risk Mitigation         Ordered     heparin (porcine) injection 5,000 Units  Every 8 hours     Route:  Subcutaneous        04/02/18 2021     IP VTE HIGH RISK PATIENT  Once      04/02/18 2021        Patrick Jordan MD  Department of Hospital Medicine   [facility]

## 2018-04-03 NOTE — ASSESSMENT & PLAN NOTE
Consult Nephrology  Renal diet  Renal dose medications  No NSAIDs/Nephrotoxic agents  Monitor labs

## 2018-04-03 NOTE — CONSULTS
"[facility]  Cardiology  Consult Note    Patient Name: Andra Newell  MRN: [id number]  Admission Date: 4/2/2018  Hospital Length of Stay: 0 days  Code Status: Full Code   Attending Provider: Leighton Biswas MD   Consulting Provider: Charleen Moise NP  Primary Care Physician: Diony Alvarado MD  Principal Problem:Chest pain    Patient information was obtained from patient, significant other and medical record.     Inpatient consult to Cardiology  Consult performed by: CHARLEEN MOISE  Consult ordered by: LEIGHTON BISWAS  Reason for consult: chest pain      This is a new patient to our group.   Subjective:     Chief Complaint: Chest pain     HPI:   PCP: Dr. Alvarado  Nephrology: Dr. Heaton     59 y/o female with a h/o type 2 DM, HTN, interstitial nephritis, ESRD awaiting HD and renal transplant, hypothyroidism, HLD, depression, PTSD, and  gout who presented to the ED yesterday with a c/o constant CP with varying intensity that started yesterday morning while getting dressed. Describes CP as a "pain" in the middle of her lower anterior chest and under the left breast. CP does not radiate and occurs with rest and activity. Patient admits to associated weakness in the legs, nausea, a 2 - 3 month h/o BOYER and a gradually worsening posterior headache. No alleviating or exacerbating factors. Patient states that she tried to rest and lay down but did not notice any improvement in her chest pain. She received a GI cocktail in the ED with significant improvement in the CP. Denies associated fever, palpitations, abdominal pain, vomiting, syncope, leg swelling and orthopnea.   In the past year, she has experienced 2 other episodes of chest pain described as a "heavniess" and different what she is currently experiencing.     Patient reports compliance with medications. Denies missed doses. Patient is awaiting HD and renal transplant. She says that she drinks about 12 bottles of water a day but mentions that " her UOP is only 8-10oz of urin a day. She is on Lasix at home.     Family history: Father had a stroke in his late 40s and passed away from a heart attack at age 55 or 56. Patient's brother had coronary stents placed at age 53. Patient is not under the care of a cardiologist; however, she had a dobutamine stress echo done less than a year ago in 07/2017 which was negative for ischemia. This admission, patient's serial troponins x 2 are not elevated and her EKG does not demonstrate any acute ischemic changes.     Past Medical History:   Diagnosis Date    Anemia     Arthritis     Asthma     allergic airway    Depression     Diabetes mellitus     Eosinophilia     Gout     Gout     Hyperlipidemia     Hypertension     Low back pain     MRSA carrier     Obesity     Renal disorder     Rotator cuff syndrome--left     Thyroid disease     Ulnar neuropathy of right upper extremity        Past Surgical History:   Procedure Laterality Date    ACHILLES TENDON SURGERY Left May 2015    BACK SURGERY      CHOLECYSTECTOMY      COLONOSCOPY N/A 9/6/2017    Procedure: COLONOSCOPY;  Surgeon: Marisol Bryson MD;  Location: [facility];  Service: Endoscopy;  Laterality: N/A;    DIALYSIS FISTULA CREATION      HEMORRHOID SURGERY      JOINT REPLACEMENT      left    KNEE SURGERY      SHOULDER ARTHROSCOPY      SHOULDER SURGERY         Review of patient's allergies indicates:   Allergen Reactions    Codeine Itching     Can take oxycodone and hydrocodone with Benadryl       No current facility-administered medications on file prior to encounter.      Current Outpatient Prescriptions on File Prior to Encounter   Medication Sig    amlodipine (NORVASC) 10 MG tablet Take 10 mg by mouth once daily.    atorvastatin (LIPITOR) 20 MG tablet Take 20 mg by mouth once daily.    calcitRIOL (ROCALTROL) 0.5 MCG Cap Take 0.5 mcg by mouth once daily.     famotidine (PEPCID) 20 MG tablet Take 20 mg by mouth once daily at 6am.     furosemide (LASIX) 40 MG tablet Take 40 mg by mouth once daily.    insulin aspart (NOVOLOG FLEXPEN) 100 unit/mL InPn pen Up to 20 units 3 times daily with meals; follow physician instructions    insulin glargine (BASAGLAR KWIKPEN) 100 unit/mL (3 mL) InPn pen Inject 90 Units into the skin 2 (two) times daily. 90 units SQ QAM, 90 units SQ QPM    levothyroxine (SYNTHROID) 75 MCG tablet Take 75 mcg by mouth once daily.    loratadine (CLARITIN) 10 mg tablet Take 10 mg by mouth once daily.    lorazepam (ATIVAN) 1 MG tablet Take 1 mg by mouth 2 (two) times daily.    sodium bicarbonate 650 MG tablet Take 650 mg by mouth once daily.     vilazodone (VIIBRYD) 40 mg Tab tablet Take 40 mg by mouth once daily.     Family History     Problem Relation (Age of Onset)    Alzheimer's disease Mother    Diabetes Mother, Sister    Heart disease Father, Brother    Hyperlipidemia Mother    Lupus Sister    Ovarian cancer Sister    Stroke Father    Thyroid disease Mother        Social History Main Topics    Smoking status: Never Smoker    Smokeless tobacco: Never Used    Alcohol use No    Drug use: No    Sexual activity: No     Review of Systems   Constitution: Positive for decreased appetite, weakness and malaise/fatigue. Negative for chills, diaphoresis, fever and weight gain.   HENT: Negative for congestion and sore throat.    Eyes:        No acute vision changes   Cardiovascular: Positive for chest pain and dyspnea on exertion. Negative for irregular heartbeat, leg swelling, orthopnea, palpitations and syncope.   Respiratory: Positive for cough and shortness of breath. Negative for hemoptysis, sputum production and wheezing.    Endocrine: Positive for heat intolerance and polydipsia. Negative for cold intolerance and polyphagia.   Hematologic/Lymphatic: Does not bruise/bleed easily.   Skin: Negative for color change and rash.   Musculoskeletal: Positive for back pain (chronic at baseline ) and neck pain (chronic at baseline).  "Negative for joint swelling.   Gastrointestinal: Positive for nausea. Negative for abdominal pain, constipation, diarrhea, heartburn, hematochezia, melena and vomiting.   Genitourinary: Negative for hematuria.        Decrease in urine volume, "8-10oz/day"   Neurological: Positive for headaches and light-headedness. Negative for aphonia, dizziness, focal weakness and sensory change.        H/o neuropathy in toe    Psychiatric/Behavioral: Positive for depression.   Allergic/Immunologic: Negative for environmental allergies and persistent infections.     Objective:     Vital Signs (Most Recent):  Temp: 96.4 °F (35.8 °C) (04/03/18 0719)  Pulse: 80 (04/03/18 0719)  Resp: 16 (04/03/18 0719)  BP: (!) 176/78 (04/03/18 0719)  SpO2: 96 % (04/03/18 0719) Vital Signs (24h Range):  Temp:  [96.4 °F (35.8 °C)-98.7 °F (37.1 °C)] 96.4 °F (35.8 °C)  Pulse:  [71-92] 80  Resp:  [14-20] 16  SpO2:  [93 %-99 %] 96 %  BP: (150-182)/(66-83) 176/78     Weight: 106.6 kg (235 lb)  Body mass index is 36.81 kg/m².    SpO2: 96 %  O2 Device (Oxygen Therapy): room air    No intake or output data in the 24 hours ending 04/03/18 1025    Lines/Drains/Airways     Epidural Line                 Perineural Analgesia/Anesthesia Assessment (using dermatomes) Epidural 11/24/15 0750 861 days          Peripheral Intravenous Line                 Peripheral IV - Single Lumen 04/02/18 1815 Right Antecubital less than 1 day                Physical Exam   Constitutional: She is oriented to person, place, and time. She appears well-developed and well-nourished. No distress.   HENT:   Head: Normocephalic and atraumatic.   Eyes: Conjunctivae and EOM are normal. Right eye exhibits no discharge. Left eye exhibits no discharge. No scleral icterus.   Neck: Normal range of motion. Neck supple. No JVD present. Carotid bruit is not present.   Difficult to fully assess JVD d/t body habitus    Cardiovascular: Normal rate, regular rhythm and intact distal pulses.    Murmur " heard.  Pulses:       Radial pulses are 2+ on the right side, and 2+ on the left side.        Dorsalis pedis pulses are 2+ on the right side, and 2+ on the left side.   Pulmonary/Chest: Effort normal and breath sounds normal. No respiratory distress. She has no wheezes. She has no rales. She exhibits tenderness.       Abdominal: Soft. Bowel sounds are normal. There is no tenderness. There is no guarding.   Musculoskeletal:   No evidence of LE pitting edema.    Neurological: She is alert and oriented to person, place, and time.   Follows commands, moves all extremities.    Skin: Skin is warm and dry. She is not diaphoretic. No cyanosis. Nails show no clubbing.   Psychiatric: She has a normal mood and affect. Her behavior is normal.   Nursing note and vitals reviewed.      Significant Labs:   BMP:   Recent Labs  Lab 04/02/18  1800 04/03/18  0406   * 199*    143   K 3.9 3.6    106   CO2 23 24   BUN 65* 65*   CREATININE 3.6* 3.5*   CALCIUM 10.9* 10.8*   , CMP   Recent Labs  Lab 04/02/18  1800 04/03/18  0406    143   K 3.9 3.6    106   CO2 23 24   * 199*   BUN 65* 65*   CREATININE 3.6* 3.5*   CALCIUM 10.9* 10.8*   PROT 7.6 6.9   ALBUMIN 3.4* 3.2*   BILITOT 0.3 0.3   ALKPHOS 179* 151*   AST 17 15   ALT 18 18   ANIONGAP 13 13   ESTGFRAFRICA 15* 16*   EGFRNONAA 13* 13*   , CBC   Recent Labs  Lab 04/02/18  1800 04/03/18  0406   WBC 11.90 10.30   HGB 10.6* 10.4*   HCT 31.9* 31.2*    200   , Lipid Panel No results for input(s): CHOL, HDL, LDLCALC, TRIG, CHOLHDL in the last 48 hours. and Troponin   Recent Labs  Lab 04/02/18  1800 04/02/18  2142 04/03/18  0406   TROPONINI 0.022 0.009 0.017       Significant Imaging:   Echo 04/03/2018:  Results pending    Abdominal Xray 04/02/2018:  FINDINGS:  There are cholecystectomy clips.  There are pelvic calcifications that appear vascular.  There is gas scattered intimately in nondistended bowel.  Degenerative changes evident in the lumbar  spine.    Impression:  No acute findings.    CXR 04/02/2018:  The cardiomediastinal silhouette is stable.  There is mild strandy infiltrate or atelectasis right infrahilar.  There is no consolidation.  There is no pleural effusion.  Impression:  Mild right infrahilar stranding without consolidation    Prior Dobutamine Stress Echo 07/2017:  TEST DESCRIPTION   The patient received a graduated infusion of Dobutamine beginning at 10.00 mcg/Kg/min to a peak dose of 30 mcg/Kg/min, achieving a peak heart rate of 150 bpm, which is 97% of the age predicted maximum heart rate. .     EKG Conclusions:  1. The EKG portion of this study is negative for ischemia at a peak heart rate of 150 bpm (97% of predicted).   2. Blood pressure remained stable throughout the protocol  (Presenting BP: 161/63 Peak BP: 192/75).   3. No significant arrhythmias were present.   4. There were no symptoms of chest discomfort or significant dyspnea throughout the protocol.     CONCLUSIONS     1 - Concentric remodeling.     2 - No wall motion abnormalities.     3 - Normal left ventricular systolic function (EF 60-65%).     4 - Normal left ventricular diastolic function.     5 - Right ventricular enlargement with normal systolic function.     6 - Pulmonary hypertension. The estimated PA systolic pressure is 41 mmHg.     7 - Difficult windows, Optison contrast used for wall motion analysis.     No evidence of stress induced myocardial ischemia.     Assessment and Plan:     Active Hospital Problems    Diagnosis    *Chest pain    Hypercalcemia    Dysuria    Exertional dyspnea    Anemia in stage 5 chronic kidney disease, not on chronic dialysis    HLD (hyperlipidemia)    ESRD (end stage renal disease)    Hypothyroidism    Essential hypertension    Uncontrolled type 2 diabetes mellitus with stage 5 chronic kidney disease not on chronic dialysis, with long-term current use of insulin     Cardiology evaluation for chest pain. Chest pain with atypical  features and was reproducible on exam. Patient with ESRD currently awaiting HD and renal transplant. No elevation in serial troponin x 2 or acute ischemic changes on EKG.  The patient does have some cardiac risk factors and underwent a cardiac stress test in the past year which was negative for ischemia (additional results above).      - No invasive strategies at this time. Will proceed with further eval with TTE  - Continue home regimen of statin (lipitor) and Norvasc.   - No ACE/ARB d/t renal issues   - Therapeutic lifestyle modifications - low sodium diet, increase in aerobic exercise, weight loss   - Nephrology consult as per primary team     Assessment and plan was reviewed with the patient and her significant other. This patient has also been discussed with collaborating physician, Dr. Barrett.  Please see Dr. Barrett's MD attestation above for additional information/recommendations.       Charleen Moise NP  Cardiology   [facility]

## 2018-04-03 NOTE — PLAN OF CARE
Problem: Patient Care Overview  Goal: Plan of Care Review  Outcome: Ongoing (interventions implemented as appropriate)  Patient alert and oriented resting in bed. NAD. Denies pain or SOB. VSS. Normal SR on monitor. I&O monitoring.   Plan of care reviewed with patient. Verbalizes understanding.Call light in reach. Pt free from fall or injury. Will monitor.

## 2018-04-03 NOTE — HPI
Ms. Newell is a 59yo WF with a PMH HTN, DM2, Hypothyroidism, Depression, Gout, Interstitial Nephritis, and ESRD awaiting HD and renal transplant. She had a dialysis graft placed 6 months ago, but has not started dialysis. Dr. Heaton is her nephrologist. She presents to the ED with c/o Chest pain. It started this morning and has been intermittent, mid-sternal, and non-radiating. It's sharp with a heaviness. She reports having chest pain intermittently for a few months, but not this bad and it does not feel like her influx. She reports having a stress test 7/2017 for renal transplant and it was normal. Associated symptoms include SOB, HA, nausea, and generalized weakness. The SOB occurs with exertion and is getting worst. Today, her legs were very weak and felt like jelly. The HA and nausea are chronic for her. She has also been having dysuria with bilateral flank and lower abdominal. The pain is a dull ache and worst with movement. She was last treated for a UTI 2 months ago and she has plenty burning when she urinates. While in the ED, her Ca was high at 10.9 and she was bolused with IVF. Her troponin and UA were unremarkable. A KUB was also done. She currently denies pain or discomforts.

## 2018-04-03 NOTE — SUBJECTIVE & OBJECTIVE
Past Medical History:   Diagnosis Date    Anemia     Arthritis     Asthma     allergic airway    Depression     Diabetes mellitus     Eosinophilia     Gout     Gout     Hyperlipidemia     Hypertension     Low back pain     MRSA carrier     Obesity     Renal disorder     Rotator cuff syndrome--left     Thyroid disease     Ulnar neuropathy of right upper extremity        Past Surgical History:   Procedure Laterality Date    ACHILLES TENDON SURGERY Left May 2015    BACK SURGERY      CHOLECYSTECTOMY      COLONOSCOPY N/A 9/6/2017    Procedure: COLONOSCOPY;  Surgeon: Marisol Bryson MD;  Location: [facility];  Service: Endoscopy;  Laterality: N/A;    DIALYSIS FISTULA CREATION      HEMORRHOID SURGERY      JOINT REPLACEMENT      left    KNEE SURGERY      SHOULDER ARTHROSCOPY      SHOULDER SURGERY         Review of patient's allergies indicates:   Allergen Reactions    Codeine Itching     Can take oxycodone and hydrocodone with Benadryl       No current facility-administered medications on file prior to encounter.      Current Outpatient Prescriptions on File Prior to Encounter   Medication Sig    amlodipine (NORVASC) 10 MG tablet Take 10 mg by mouth once daily.    atorvastatin (LIPITOR) 20 MG tablet Take 20 mg by mouth once daily.    calcitRIOL (ROCALTROL) 0.5 MCG Cap Take 0.5 mcg by mouth once daily.     famotidine (PEPCID) 20 MG tablet Take 20 mg by mouth once daily at 6am.    furosemide (LASIX) 40 MG tablet Take 40 mg by mouth once daily.    insulin aspart (NOVOLOG FLEXPEN) 100 unit/mL InPn pen Up to 20 units 3 times daily with meals; follow physician instructions    insulin glargine (BASAGLAR KWIKPEN) 100 unit/mL (3 mL) InPn pen Inject 90 Units into the skin 2 (two) times daily. 90 units SQ QAM, 90 units SQ QPM    levothyroxine (SYNTHROID) 75 MCG tablet Take 75 mcg by mouth once daily.    loratadine (CLARITIN) 10 mg tablet Take 10 mg by mouth once daily.    lorazepam (ATIVAN) 1 MG  "tablet Take 1 mg by mouth 2 (two) times daily.    sodium bicarbonate 650 MG tablet Take 650 mg by mouth once daily.     vilazodone (VIIBRYD) 40 mg Tab tablet Take 40 mg by mouth once daily.     Family History     Problem Relation (Age of Onset)    Alzheimer's disease Mother    Diabetes Mother, Sister    Heart disease Father, Brother    Hyperlipidemia Mother    Lupus Sister    Ovarian cancer Sister    Stroke Father    Thyroid disease Mother        Social History Main Topics    Smoking status: Never Smoker    Smokeless tobacco: Never Used    Alcohol use No    Drug use: No    Sexual activity: No     Review of Systems   Constitution: Positive for decreased appetite, weakness and malaise/fatigue. Negative for chills, diaphoresis, fever and weight gain.   HENT: Negative for congestion and sore throat.    Eyes:        No acute vision changes   Cardiovascular: Positive for chest pain and dyspnea on exertion. Negative for irregular heartbeat, leg swelling, orthopnea, palpitations and syncope.   Respiratory: Positive for cough and shortness of breath. Negative for hemoptysis, sputum production and wheezing.    Endocrine: Positive for heat intolerance and polydipsia. Negative for cold intolerance and polyphagia.   Hematologic/Lymphatic: Does not bruise/bleed easily.   Skin: Negative for color change and rash.   Musculoskeletal: Positive for back pain (chronic at baseline ) and neck pain (chronic at baseline). Negative for joint swelling.   Gastrointestinal: Positive for nausea. Negative for abdominal pain, constipation, diarrhea, heartburn, hematochezia, melena and vomiting.   Genitourinary: Negative for hematuria.        Decrease in urine volume, "8-10oz/day"   Neurological: Positive for headaches and light-headedness. Negative for aphonia, dizziness, focal weakness and sensory change.        H/o neuropathy in toe    Psychiatric/Behavioral: Positive for depression.   Allergic/Immunologic: Negative for environmental " allergies and persistent infections.     Objective:     Vital Signs (Most Recent):  Temp: 96.4 °F (35.8 °C) (04/03/18 0719)  Pulse: 80 (04/03/18 0719)  Resp: 16 (04/03/18 0719)  BP: (!) 176/78 (04/03/18 0719)  SpO2: 96 % (04/03/18 0719) Vital Signs (24h Range):  Temp:  [96.4 °F (35.8 °C)-98.7 °F (37.1 °C)] 96.4 °F (35.8 °C)  Pulse:  [71-92] 80  Resp:  [14-20] 16  SpO2:  [93 %-99 %] 96 %  BP: (150-182)/(66-83) 176/78     Weight: 106.6 kg (235 lb)  Body mass index is 36.81 kg/m².    SpO2: 96 %  O2 Device (Oxygen Therapy): room air    No intake or output data in the 24 hours ending 04/03/18 1025    Lines/Drains/Airways     Epidural Line                 Perineural Analgesia/Anesthesia Assessment (using dermatomes) Epidural 11/24/15 0750 861 days          Peripheral Intravenous Line                 Peripheral IV - Single Lumen 04/02/18 1815 Right Antecubital less than 1 day                Physical Exam   Constitutional: She is oriented to person, place, and time. She appears well-developed and well-nourished. No distress.   HENT:   Head: Normocephalic and atraumatic.   Eyes: Conjunctivae and EOM are normal. Right eye exhibits no discharge. Left eye exhibits no discharge. No scleral icterus.   Neck: Normal range of motion. Neck supple. No JVD present. Carotid bruit is not present.   Difficult to fully assess JVD d/t body habitus    Cardiovascular: Normal rate, regular rhythm and intact distal pulses.    Murmur heard.  Pulses:       Radial pulses are 2+ on the right side, and 2+ on the left side.        Dorsalis pedis pulses are 2+ on the right side, and 2+ on the left side.   Pulmonary/Chest: Effort normal and breath sounds normal. No respiratory distress. She has no wheezes. She has no rales. She exhibits tenderness.       Abdominal: Soft. Bowel sounds are normal. There is no tenderness. There is no guarding.   Musculoskeletal:   No evidence of LE pitting edema.    Neurological: She is alert and oriented to person,  place, and time.   Follows commands, moves all extremities.    Skin: Skin is warm and dry. She is not diaphoretic. No cyanosis. Nails show no clubbing.   Psychiatric: She has a normal mood and affect. Her behavior is normal.   Nursing note and vitals reviewed.      Significant Labs:   BMP:   Recent Labs  Lab 04/02/18  1800 04/03/18  0406   * 199*    143   K 3.9 3.6    106   CO2 23 24   BUN 65* 65*   CREATININE 3.6* 3.5*   CALCIUM 10.9* 10.8*   , CMP   Recent Labs  Lab 04/02/18  1800 04/03/18  0406    143   K 3.9 3.6    106   CO2 23 24   * 199*   BUN 65* 65*   CREATININE 3.6* 3.5*   CALCIUM 10.9* 10.8*   PROT 7.6 6.9   ALBUMIN 3.4* 3.2*   BILITOT 0.3 0.3   ALKPHOS 179* 151*   AST 17 15   ALT 18 18   ANIONGAP 13 13   ESTGFRAFRICA 15* 16*   EGFRNONAA 13* 13*   , CBC   Recent Labs  Lab 04/02/18  1800 04/03/18  0406   WBC 11.90 10.30   HGB 10.6* 10.4*   HCT 31.9* 31.2*    200   , Lipid Panel No results for input(s): CHOL, HDL, LDLCALC, TRIG, CHOLHDL in the last 48 hours. and Troponin   Recent Labs  Lab 04/02/18  1800 04/02/18  2142 04/03/18  0406   TROPONINI 0.022 0.009 0.017       Significant Imaging:   Echo 04/03/2018:  Results pending    Abdominal Xray 04/02/2018:  FINDINGS:  There are cholecystectomy clips.  There are pelvic calcifications that appear vascular.  There is gas scattered intimately in nondistended bowel.  Degenerative changes evident in the lumbar spine.    Impression:  No acute findings.    CXR 04/02/2018:  The cardiomediastinal silhouette is stable.  There is mild strandy infiltrate or atelectasis right infrahilar.  There is no consolidation.  There is no pleural effusion.  Impression:  Mild right infrahilar stranding without consolidation    Prior Dobutamine Stress Echo 07/2017:  TEST DESCRIPTION   The patient received a graduated infusion of Dobutamine beginning at 10.00 mcg/Kg/min to a peak dose of 30 mcg/Kg/min, achieving a peak heart rate of 150 bpm,  which is 97% of the age predicted maximum heart rate. .     EKG Conclusions:  1. The EKG portion of this study is negative for ischemia at a peak heart rate of 150 bpm (97% of predicted).   2. Blood pressure remained stable throughout the protocol  (Presenting BP: 161/63 Peak BP: 192/75).   3. No significant arrhythmias were present.   4. There were no symptoms of chest discomfort or significant dyspnea throughout the protocol.     CONCLUSIONS     1 - Concentric remodeling.     2 - No wall motion abnormalities.     3 - Normal left ventricular systolic function (EF 60-65%).     4 - Normal left ventricular diastolic function.     5 - Right ventricular enlargement with normal systolic function.     6 - Pulmonary hypertension. The estimated PA systolic pressure is 41 mmHg.     7 - Difficult windows, Optison contrast used for wall motion analysis.     No evidence of stress induced myocardial ischemia.

## 2018-04-03 NOTE — PLAN OF CARE
Problem: Physical Therapy Goal  Goal: Physical Therapy Goal  Goals to be met by: 2018     Patient will increase functional independence with mobility by performin. Sit to stand transfer with Stand-by Assistance  2. Bed to chair transfer with Stand-by Assistance   3. Gait  x 250 feet with Contact Guard Assistance using Rolling Walker/HHA  4. Lower extremity exercise program x20 reps per handout, with assistance as needed    Outcome: Ongoing (interventions implemented as appropriate)  PT eval and treat completed- presents with bilateral LE's weakness, decreased energy level and early fatigue. Will benefit from continued therapies

## 2018-04-03 NOTE — ED NOTES
"Presents to the ER with c/o mid sternal chest pain that started a few weeks ago, but was worsened today with severe, stabbing pain. Associated complaints are SOB, diaphoresis and nausea. Denies any vomiting. Patient reports being at the clinic today having blood drawn this morning because she is on the transplant list for a kidney transplant. Patient has an AV fistula to left arm that is positive for thrill and bruit. Patient reports that she "Might start dialysis in May." Mucous membranes are pink and moist. Skin is warm, dry and intact. Lungs are clear bilaterally, respirations are regular and unlabored. Denies cough, congestion, rhinorrhea or SOB. BS active x4, no tenderness with palpation, abd is soft and not distended. Denies any appetite or activity change. S1S2, capillary refill is < 2 seconds. Denies dysuria, difficulty urinating, frequency, numbness or tingling.  YARELIS VSS      Patient report intermittent weakness with ambulation that started today.   "

## 2018-04-03 NOTE — SUBJECTIVE & OBJECTIVE
Past Medical History:   Diagnosis Date    Anemia     Arthritis     Asthma     allergic airway    Depression     Diabetes mellitus     Eosinophilia     Gout     Gout     Hyperlipidemia     Hypertension     Low back pain     MRSA carrier     Obesity     Renal disorder     Rotator cuff syndrome--left     Thyroid disease     Ulnar neuropathy of right upper extremity        Past Surgical History:   Procedure Laterality Date    ACHILLES TENDON SURGERY Left May 2015    BACK SURGERY      CHOLECYSTECTOMY      COLONOSCOPY N/A 9/6/2017    Procedure: COLONOSCOPY;  Surgeon: Marisol Bryson MD;  Location: [facility];  Service: Endoscopy;  Laterality: N/A;    DIALYSIS FISTULA CREATION      HEMORRHOID SURGERY      JOINT REPLACEMENT      left    KNEE SURGERY      SHOULDER ARTHROSCOPY      SHOULDER SURGERY         Review of patient's allergies indicates:   Allergen Reactions    Codeine Itching     Can take oxycodone and hydrocodone with Benadryl       No current facility-administered medications on file prior to encounter.      Current Outpatient Prescriptions on File Prior to Encounter   Medication Sig    amlodipine (NORVASC) 10 MG tablet Take 10 mg by mouth once daily.    atorvastatin (LIPITOR) 20 MG tablet Take 20 mg by mouth once daily.    calcitRIOL (ROCALTROL) 0.5 MCG Cap Take 0.5 mcg by mouth once daily.     famotidine (PEPCID) 20 MG tablet Take 20 mg by mouth once daily at 6am.    furosemide (LASIX) 40 MG tablet Take 40 mg by mouth once daily.    insulin aspart (NOVOLOG FLEXPEN) 100 unit/mL InPn pen Up to 20 units 3 times daily with meals; follow physician instructions    insulin glargine (BASAGLAR KWIKPEN) 100 unit/mL (3 mL) InPn pen Inject 90 Units into the skin 2 (two) times daily. 90 units SQ QAM, 90 units SQ QPM    levothyroxine (SYNTHROID) 75 MCG tablet Take 75 mcg by mouth once daily.    loratadine (CLARITIN) 10 mg tablet Take 10 mg by mouth once daily.    lorazepam (ATIVAN) 1 MG  tablet Take 1 mg by mouth 2 (two) times daily.    sodium bicarbonate 650 MG tablet Take 650 mg by mouth once daily.     vilazodone (VIIBRYD) 40 mg Tab tablet Take 40 mg by mouth once daily.    [DISCONTINUED] blood sugar diagnostic Strp To check BG 3-4 times daily, to use with insurance preferred meter    [DISCONTINUED] irbesartan (AVAPRO) 150 MG tablet Take 150 mg by mouth every evening.    [DISCONTINUED] lancets Misc To check BG 3-4 times daily, to use with insurance preferred meter     Family History     Problem Relation (Age of Onset)    Alzheimer's disease Mother    Diabetes Mother, Sister    Heart disease Father, Brother    Hyperlipidemia Mother    Lupus Sister    Ovarian cancer Sister    Stroke Father    Thyroid disease Mother        Social History Main Topics    Smoking status: Never Smoker    Smokeless tobacco: Never Used    Alcohol use No    Drug use: No    Sexual activity: No     Review of Systems   Constitutional: Positive for fatigue. Negative for chills and fever.   HENT: Negative for congestion.    Eyes: Negative for visual disturbance.   Respiratory: Positive for shortness of breath. Negative for cough.         Exertional Dyspnea   Cardiovascular: Positive for chest pain. Negative for palpitations and leg swelling.   Gastrointestinal: Positive for abdominal pain and nausea. Negative for diarrhea and vomiting.   Genitourinary: Positive for dysuria and flank pain. Negative for hematuria.   Musculoskeletal: Positive for back pain and gait problem.        Chronic back pain. Difficulty ambulating due to weakness   Skin: Negative for wound.   Neurological: Positive for headaches. Negative for dizziness and syncope.   Psychiatric/Behavioral: Negative for confusion and hallucinations.     Objective:     Vital Signs (Most Recent):  Temp: 98.7 °F (37.1 °C) (04/02/18 2019)  Pulse: 71 (04/02/18 2019)  Resp: 18 (04/02/18 2019)  BP: (!) 178/83 (04/02/18 2019)  SpO2: 96 % (04/02/18 2019) Vital Signs (24h  Range):  Temp:  [98.7 °F (37.1 °C)] 98.7 °F (37.1 °C)  Pulse:  [71-92] 71  Resp:  [14-18] 18  SpO2:  [96 %-99 %] 96 %  BP: (150-182)/(66-83) 178/83     Weight: 106.6 kg (235 lb)  Body mass index is 36.81 kg/m².    Physical Exam   Constitutional: She is oriented to person, place, and time. No distress.   HENT:   Head: Normocephalic.   Eyes: Pupils are equal, round, and reactive to light.   Neck: Normal range of motion. Neck supple. No JVD present. No tracheal deviation present.   Cardiovascular: Normal rate, regular rhythm and intact distal pulses.    Murmur heard.  NSR HR 68 on telemetry   Pulmonary/Chest: Effort normal and breath sounds normal. No respiratory distress.   Abdominal: Soft. Bowel sounds are normal. She exhibits no distension. There is no tenderness.   Musculoskeletal: Normal range of motion. She exhibits no edema.   Left AC Dialysis Fistula with (+) thrill and bruit.   Neurological: She is alert and oriented to person, place, and time. No cranial nerve deficit.   Skin: Skin is warm, dry and intact. Capillary refill takes less than 2 seconds.   Psychiatric: She has a normal mood and affect. Her behavior is normal. Judgment and thought content normal.         CRANIAL NERVES     CN III, IV, VI   Pupils are equal, round, and reactive to light.       Significant Labs:   CBC:   Recent Labs  Lab 04/02/18  1800   WBC 11.90   HGB 10.6*   HCT 31.9*        CMP:   Recent Labs  Lab 04/02/18  1800      K 3.9      CO2 23   *   BUN 65*   CREATININE 3.6*   CALCIUM 10.9*   PROT 7.6   ALBUMIN 3.4*   BILITOT 0.3   ALKPHOS 179*   AST 17   ALT 18   ANIONGAP 13   EGFRNONAA 13*     Cardiac Markers:   Recent Labs  Lab 04/02/18  1800   BNP 22     Troponin:   Recent Labs  Lab 04/02/18  1800   TROPONINI 0.022     Urine Studies:   Recent Labs  Lab 04/02/18  1822   COLORU Yellow   APPEARANCEUA Clear   PHUR 6.0   SPECGRAV 1.010   PROTEINUA 2+*   GLUCUA Negative   KETONESU Negative   BILIRUBINUA Negative    OCCULTUA Trace*   NITRITE Negative   UROBILINOGEN Negative   LEUKOCYTESUR Trace*   RBCUA 0   WBCUA 1   BACTERIA Occasional   SQUAMEPITHEL 2   HYALINECASTS 1     Microbiology Results (last 7 days)     Procedure Component Value Units Date/Time    Urine culture [[id number]] Collected:  04/02/18 1823    Order Status:  Sent Specimen:  Urine from Urine, Clean Catch Updated:  04/02/18 1823            Significant Imaging: CXR: Reviewed film, hazy at the bases    Last Dobutamine Stress ECHO Test 7/26/17: CONCLUSIONS     1 - Concentric remodeling.     2 - No wall motion abnormalities.     3 - Normal left ventricular systolic function (EF 60-65%).     4 - Normal left ventricular diastolic function.     5 - Right ventricular enlargement with normal systolic function.     6 - Pulmonary hypertension. The estimated PA systolic pressure is 41 mmHg.     7 - Difficult windows, Optison contrast used for wall motion analysis.     No evidence of stress induced myocardial ischemia.

## 2018-04-03 NOTE — ASSESSMENT & PLAN NOTE
Chronic/Mildly uncontrolled  Continue chronic medications, adjusting as needed  Monitor VS  IV Hydralazine prn

## 2018-04-03 NOTE — ED NOTES
Upon transport to room 206, patient is AAOx4, no cardiac or respiratory complications at this time. Patient does not have any needs or questions before transport.

## 2018-04-03 NOTE — HPI
"PCP: Dr. Alvarado  Nephrology: Dr. Heaton     59 y/o female with a h/o type 2 DM, HTN, interstitial nephritis, ESRD awaiting HD and renal transplant, hypothyroidism, HLD, depression, PTSD, and  gout who presented to the ED yesterday with a c/o constant CP with varying intensity that started yesterday morning while getting dressed. Describes CP as a "pain" in the middle of her lower anterior chest and under the left breast. CP does not radiate and occurs with rest and activity. Patient admits to associated weakness in the legs, nausea, a 2 - 3 month h/o BOYER and a gradually worsening posterior headache. No alleviating or exacerbating factors. Patient states that she tried to rest and lay down but did not notice any improvement in her chest pain. She received a GI cocktail in the ED with significant improvement in the CP. Denies associated fever, palpitations, abdominal pain, vomiting, syncope, leg swelling and orthopnea.   In the past year, she has experienced 2 other episodes of chest pain described as a "heavniess" and different what she is currently experiencing.     Patient reports compliance with medications. Denies missed doses. Patient is awaiting HD and renal transplant. She says that she drinks about 12 bottles of water a day but mentions that her UOP is only 8-10oz of urin a day. She is on Lasix at home.     Family history: Father had a stroke in his late 40s and passed away from a heart attack at age 55 or 56. Patient's brother had coronary stents placed at age 53. Patient is not under the care of a cardiologist; however, she had a dobutamine stress echo done less than a year ago in 07/2017 which was negative for ischemia. This admission, patient's serial troponins x 2 are not elevated and her EKG does not demonstrate any acute ischemic changes.   "

## 2018-04-03 NOTE — H&P
[facility] Hospital Medicine  History & Physical    Patient Name: Andra Newell  MRN: [id number]  Admission Date: 4/2/2018  Attending Physician: Patrick Jordan MD   Primary Care Provider: Diony Alvarado MD         Patient information was obtained from patient, past medical records and ER records.     Subjective:     Principal Problem:Chest pain    Chief Complaint:   Chief Complaint   Patient presents with    Chest Pain     off and on since this am.        HPI: Ms. Newell is a 57yo WF with a PMH HTN, DM2, Hypothyroidism, Depression, Gout, Interstitial Nephritis, and ESRD awaiting HD and renal transplant. She had a dialysis graft placed 6 months ago, but has not started dialysis. Dr. Heaton is her nephrologist. She presents to the ED with c/o Chest pain. It started this morning and has been intermittent, mid-sternal, and non-radiating. It's sharp with a heaviness. She reports having chest pain intermittently for a few months, but not this bad and it does not feel like her influx. She reports having a stress test for renal transplant and it was normal. Associated symptoms include SOB, HA, nausea, and generalized weakness. The SOB occurs with exertion and is getting worst. Today, her legs were very weak and felt like jelly. The HA and nausea are chronic for her. She has also been having dysuria with bilateral flank and lower abdominal. The pain is a dull ache and worst with movement. She was last treated for a UTI 2 months ago and she has plenty burning when she urinates. While in the ED, her Ca was high at 10.9 and she was bolused with IVF. Her troponin and UA were unremarkable. A KUB was also done. She currently denies pain or discomforts.            Past Medical History:   Diagnosis Date    Anemia     Arthritis     Asthma     allergic airway    Depression     Diabetes mellitus     Eosinophilia     Gout     Gout     Hyperlipidemia     Hypertension     Low back pain      MRSA carrier     Obesity     Renal disorder     Rotator cuff syndrome--left     Thyroid disease     Ulnar neuropathy of right upper extremity        Past Surgical History:   Procedure Laterality Date    ACHILLES TENDON SURGERY Left May 2015    BACK SURGERY      CHOLECYSTECTOMY      COLONOSCOPY N/A 9/6/2017    Procedure: COLONOSCOPY;  Surgeon: Marisol Bryson MD;  Location: [facility];  Service: Endoscopy;  Laterality: N/A;    DIALYSIS FISTULA CREATION      HEMORRHOID SURGERY      JOINT REPLACEMENT      left    KNEE SURGERY      SHOULDER ARTHROSCOPY      SHOULDER SURGERY         Review of patient's allergies indicates:   Allergen Reactions    Codeine Itching     Can take oxycodone and hydrocodone with Benadryl       No current facility-administered medications on file prior to encounter.      Current Outpatient Prescriptions on File Prior to Encounter   Medication Sig    amlodipine (NORVASC) 10 MG tablet Take 10 mg by mouth once daily.    atorvastatin (LIPITOR) 20 MG tablet Take 20 mg by mouth once daily.    calcitRIOL (ROCALTROL) 0.5 MCG Cap Take 0.5 mcg by mouth once daily.     famotidine (PEPCID) 20 MG tablet Take 20 mg by mouth once daily at 6am.    furosemide (LASIX) 40 MG tablet Take 40 mg by mouth once daily.    insulin aspart (NOVOLOG FLEXPEN) 100 unit/mL InPn pen Up to 20 units 3 times daily with meals; follow physician instructions    insulin glargine (BASAGLAR KWIKPEN) 100 unit/mL (3 mL) InPn pen Inject 90 Units into the skin 2 (two) times daily. 90 units SQ QAM, 90 units SQ QPM    levothyroxine (SYNTHROID) 75 MCG tablet Take 75 mcg by mouth once daily.    loratadine (CLARITIN) 10 mg tablet Take 10 mg by mouth once daily.    lorazepam (ATIVAN) 1 MG tablet Take 1 mg by mouth 2 (two) times daily.    sodium bicarbonate 650 MG tablet Take 650 mg by mouth once daily.     vilazodone (VIIBRYD) 40 mg Tab tablet Take 40 mg by mouth once daily.    [DISCONTINUED] blood sugar diagnostic  Strp To check BG 3-4 times daily, to use with insurance preferred meter    [DISCONTINUED] irbesartan (AVAPRO) 150 MG tablet Take 150 mg by mouth every evening.    [DISCONTINUED] lancets Misc To check BG 3-4 times daily, to use with insurance preferred meter     Family History     Problem Relation (Age of Onset)    Alzheimer's disease Mother    Diabetes Mother, Sister    Heart disease Father, Brother    Hyperlipidemia Mother    Lupus Sister    Ovarian cancer Sister    Stroke Father    Thyroid disease Mother        Social History Main Topics    Smoking status: Never Smoker    Smokeless tobacco: Never Used    Alcohol use No    Drug use: No    Sexual activity: No     Review of Systems   Constitutional: Positive for fatigue. Negative for chills and fever.   HENT: Negative for congestion.    Eyes: Negative for visual disturbance.   Respiratory: Positive for shortness of breath. Negative for cough.         Exertional Dyspnea   Cardiovascular: Positive for chest pain. Negative for palpitations and leg swelling.   Gastrointestinal: Positive for abdominal pain and nausea. Negative for diarrhea and vomiting.   Genitourinary: Positive for dysuria and flank pain. Negative for hematuria.   Musculoskeletal: Positive for back pain and gait problem.        Chronic back pain. Difficulty ambulating due to weakness   Skin: Negative for wound.   Neurological: Positive for headaches. Negative for dizziness and syncope.   Psychiatric/Behavioral: Negative for confusion and hallucinations.     Objective:     Vital Signs (Most Recent):  Temp: 98.7 °F (37.1 °C) (04/02/18 2019)  Pulse: 71 (04/02/18 2019)  Resp: 18 (04/02/18 2019)  BP: (!) 178/83 (04/02/18 2019)  SpO2: 96 % (04/02/18 2019) Vital Signs (24h Range):  Temp:  [98.7 °F (37.1 °C)] 98.7 °F (37.1 °C)  Pulse:  [71-92] 71  Resp:  [14-18] 18  SpO2:  [96 %-99 %] 96 %  BP: (150-182)/(66-83) 178/83     Weight: 106.6 kg (235 lb)  Body mass index is 36.81 kg/m².    Physical Exam    Constitutional: She is oriented to person, place, and time. No distress.   HENT:   Head: Normocephalic.   Eyes: Pupils are equal, round, and reactive to light.   Neck: Normal range of motion. Neck supple. No JVD present. No tracheal deviation present.   Cardiovascular: Normal rate, regular rhythm and intact distal pulses.    Murmur heard.  NSR HR 68 on telemetry   Pulmonary/Chest: Effort normal and breath sounds normal. No respiratory distress.   Abdominal: Soft. Bowel sounds are normal. She exhibits no distension. There is no tenderness.   Musculoskeletal: Normal range of motion. She exhibits no edema.   Left AC Dialysis Fistula with (+) thrill and bruit.   Neurological: She is alert and oriented to person, place, and time. No cranial nerve deficit.   Skin: Skin is warm, dry and intact. Capillary refill takes less than 2 seconds.   Psychiatric: She has a normal mood and affect. Her behavior is normal. Judgment and thought content normal.         CRANIAL NERVES     CN III, IV, VI   Pupils are equal, round, and reactive to light.       Significant Labs:   CBC:   Recent Labs  Lab 04/02/18  1800   WBC 11.90   HGB 10.6*   HCT 31.9*        CMP:   Recent Labs  Lab 04/02/18  1800      K 3.9      CO2 23   *   BUN 65*   CREATININE 3.6*   CALCIUM 10.9*   PROT 7.6   ALBUMIN 3.4*   BILITOT 0.3   ALKPHOS 179*   AST 17   ALT 18   ANIONGAP 13   EGFRNONAA 13*     Cardiac Markers:   Recent Labs  Lab 04/02/18  1800   BNP 22     Troponin:   Recent Labs  Lab 04/02/18  1800   TROPONINI 0.022     Lab Results   Component Value Date    CHOL 157 01/29/2018     Lab Results   Component Value Date    HDL 30 (L) 01/29/2018     Lab Results   Component Value Date    LDLCALC 94.8 01/29/2018     Lab Results   Component Value Date    TRIG 161 (H) 01/29/2018     Lab Results   Component Value Date    CHOLHDL 19.1 (L) 01/29/2018         Urine Studies:   Recent Labs  Lab 04/02/18  1822   COLORU Yellow   APPEARANCEUA Clear    PHUR 6.0   SPECGRAV 1.010   PROTEINUA 2+*   GLUCUA Negative   KETONESU Negative   BILIRUBINUA Negative   OCCULTUA Trace*   NITRITE Negative   UROBILINOGEN Negative   LEUKOCYTESUR Trace*   RBCUA 0   WBCUA 1   BACTERIA Occasional   SQUAMEPITHEL 2   HYALINECASTS 1     Microbiology Results (last 7 days)     Procedure Component Value Units Date/Time    Urine culture [[id number]] Collected:  04/02/18 1823    Order Status:  Sent Specimen:  Urine from Urine, Clean Catch Updated:  04/02/18 1823            Significant Imaging: CXR: Reviewed film, hazy at the bases    Last Dobutamine Stress ECHO Test 7/26/17: CONCLUSIONS     1 - Concentric remodeling.     2 - No wall motion abnormalities.     3 - Normal left ventricular systolic function (EF 60-65%).     4 - Normal left ventricular diastolic function.     5 - Right ventricular enlargement with normal systolic function.     6 - Pulmonary hypertension. The estimated PA systolic pressure is 41 mmHg.     7 - Difficult windows, Optison contrast used for wall motion analysis.     No evidence of stress induced myocardial ischemia.     Assessment/Plan:     * Chest pain    Troponin (-)--Continue to trend  Monitor on telemetry  Continue chronic statin  Not on chronic ASA  NPO after midnight        Exertional dyspnea    Obtain echocardiogram  Obtain Pulse Oximetry at rest and exercise  O2 prn          Hypercalcemia    Likley contributing to weakness  Bolused with IVF in ED  Hold Calcitriol          Dysuria    UA (-) UTI  Culture urine  Pyridium contraindicated          Essential hypertension    Chronic/Mildly uncontrolled  Continue chronic medications, adjusting as needed  Monitor VS  IV Hydralazine prn          HLD (hyperlipidemia)    Continue chronic statin  Not on chronic ASA          Anemia in stage 5 chronic kidney disease, not on chronic dialysis    Stable  Monitor labs  Transfuse if Hgb <7.0 or symptomatic          ESRD (end stage renal disease)    Consult Nephrology  Renal  diet  Renal dose medications  No NSAIDs/Nephrotoxic agents  Monitor labs          Hypothyroidism    Chronic. Continue home levothyroxine.          Uncontrolled type 2 diabetes mellitus with stage 5 chronic kidney disease not on chronic dialysis, with long-term current use of insulin    Continue home insulin regimen  Monitor blood sugars QAC&HS  SSi prn            VTE Risk Mitigation         Ordered     heparin (porcine) injection 5,000 Units  Every 8 hours     Route:  Subcutaneous        04/02/18 2021     IP VTE HIGH RISK PATIENT  Once      04/02/18 2021             Adenike Patrick NP  Department of Hospital Medicine   [facility]

## 2018-04-03 NOTE — PROGRESS NOTES
04/03/18 1415   Patient Assessment/Suction   Level of Consciousness (AVPU) alert   PRE-TX-O2-ETCO2   O2 Device (Oxygen Therapy) room air   Home Oxygen Qualification   Room Air SpO2 At Rest 98 %   Room Air SpO2 on Exertion 93 %   SpO2 on Recovery 97 %   Recovery Heart Rate 84 bpm

## 2018-04-03 NOTE — PLAN OF CARE
PCP is Dr Alvarado.  Pharmacy is [facility] on [address].  Lives with spouse; Any.  Denies HH.  Discharge plan is home; no needs.       04/03/18 1245   Discharge Assessment   Assessment Type Discharge Planning Assessment   Confirmed/corrected address and phone number on facesheet? Yes   Assessment information obtained from? Patient;Other  (spouse)   Prior to hospitilization cognitive status: Alert/Oriented   Prior to hospitalization functional status: Independent;Assistive Equipment   Current cognitive status: Alert/Oriented   Current Functional Status: Independent;Assistive Equipment   Lives With spouse   Able to Return to Prior Arrangements yes   Is patient able to care for self after discharge? Yes   Patient's perception of discharge disposition home or selfcare   Readmission Within The Last 30 Days no previous admission in last 30 days   Patient currently being followed by outpatient case management? No   Patient currently receives any other outside agency services? No   Equipment Currently Used at Home power chair;glucometer;walker, rolling;bedside commode   Do you have any problems affording any of your prescribed medications? No   Is the patient taking medications as prescribed? yes   Does the patient have transportation home? Yes   Transportation Available family or friend will provide   Does the patient receive services at the [facility]? No   Discharge Plan A Home   Patient/Family In Agreement With Plan yes

## 2018-04-03 NOTE — ASSESSMENT & PLAN NOTE
Troponin (-)--Continue to trend  Monitor on telemetry  Continue chronic statin  Not on chronic ASA  NPO after midnight

## 2018-04-03 NOTE — CONSULTS
INPATIENT NEPHROLOGY CONSULT   [facility] NEPHROLOGY    Patient Name: Andra Newell  Date: 04/03/2018    Reason for consultation: SACHIN    Chief Complaint:   Chief Complaint   Patient presents with    Chest Pain     off and on since this am.       History of Present Illness:  57 y/o F with hx of HTN, HLD, DM and stage IV CKD (last seen by Dr. FRITZ in 2/2018- Cr 2.8, eGFR 18) who p/w chest pain. It started in the AM, sharp and heavy, intermittent, midsternal and nonradiating. Her pain is worse with exertion and better with rest. Associated symptoms include generalized weakness, dysuria and lower abdominal/bilateral flank pain. We have been consulted for SACHIN with metabolic derangements. Notable home meds: calcitriol    7/2017 nuclear stress test negative  1/29/18- Cr 2.8  2/22/18- Cr 3.4    CKD presumed 2/2 DM (A1C > 8); hx of SACHIN 2/2 AIN in the past, tx'ed with oral steroids    CXR is unrevealing  Abd xray is negative  Echo is pending    Plan of Care:    Assessment:  Chest pain- stable trop, echo is pending  AoCKI, baseline stage IV CKD  Flank pain  HTN  Hypokalemia  Acidosis  Hypercalcemia/SHPT  Anemia of CKD    Plan:    1. Acute on Chronic Kidney Injury- May be 2/2 hyperCa vs underlying CKD progression. Will treat hyperCa and assess response. There is no acute indication for RRT- LUE shunt is in place and ready for use if needed. No NSAIDs or IV contrast. Reviewed urine studies- no evidence of UTI. Will get renal u/s to evaluate flank pain.    2. Volume/Blood Pressure- BP is high. Start hydralazine 10mg TID. Volume status is stable (laying flat, on RA, CXR is clear, no edema). Continue oral lasix. Awaiting echo. At this point, there is no acute evidence of ACS.    3. Electrolytes/Acid Base- + Leg cramps- ordered oral KCl 40mEq x 1. Acidosis is at goal- continue oral bicarb. HyperCa is improved with NS IVFs. Phos is normal. Awaiting PTH. Keep calcitriol on hold. Depending on response, will determine if further  workup is indicated.     4. Anemia of CKD- Stable and at goal for CKD.    Thank you for allowing us to participate in this patient's care. We will continue to follow.    Vital Signs:  Temp Readings from Last 3 Encounters:   04/03/18 96.4 °F (35.8 °C) (Oral)   02/22/18 98.1 °F (36.7 °C) (Oral)   11/03/17 99.3 °F (37.4 °C) (Oral)       Pulse Readings from Last 3 Encounters:   04/03/18 80   02/22/18 70   02/05/18 73       BP Readings from Last 3 Encounters:   04/03/18 (!) 176/78   02/22/18 (!) 151/69   02/05/18 (!) 152/80       Weight:  Wt Readings from Last 3 Encounters:   04/02/18 106.6 kg (235 lb)   02/22/18 106.6 kg (235 lb)   02/05/18 107.9 kg (237 lb 14 oz)       Past Medical & Surgical History:  Past Medical History:   Diagnosis Date    Anemia     Arthritis     Asthma     allergic airway    Depression     Diabetes mellitus     Eosinophilia     Gout     Gout     Hyperlipidemia     Hypertension     Low back pain     MRSA carrier     Obesity     Renal disorder     Rotator cuff syndrome--left     Thyroid disease     Ulnar neuropathy of right upper extremity        Past Surgical History:   Procedure Laterality Date    ACHILLES TENDON SURGERY Left May 2015    BACK SURGERY      CHOLECYSTECTOMY      COLONOSCOPY N/A 9/6/2017    Procedure: COLONOSCOPY;  Surgeon: Marisol Bryson MD;  Location: [facility];  Service: Endoscopy;  Laterality: N/A;    DIALYSIS FISTULA CREATION      HEMORRHOID SURGERY      JOINT REPLACEMENT      left    KNEE SURGERY      SHOULDER ARTHROSCOPY      SHOULDER SURGERY         Past Social History:  Social History     Social History    Marital status: Significant Other     Spouse name: N/A    Number of children: N/A    Years of education: N/A     Social History Main Topics    Smoking status: Never Smoker    Smokeless tobacco: Never Used    Alcohol use No    Drug use: No    Sexual activity: No     Other Topics Concern    None     Social History Narrative    None        Medications:  No current facility-administered medications on file prior to encounter.      Current Outpatient Prescriptions on File Prior to Encounter   Medication Sig Dispense Refill    amlodipine (NORVASC) 10 MG tablet Take 10 mg by mouth once daily.      atorvastatin (LIPITOR) 20 MG tablet Take 20 mg by mouth once daily.      calcitRIOL (ROCALTROL) 0.5 MCG Cap Take 0.5 mcg by mouth once daily.       famotidine (PEPCID) 20 MG tablet Take 20 mg by mouth once daily at 6am.      furosemide (LASIX) 40 MG tablet Take 40 mg by mouth once daily.      insulin aspart (NOVOLOG FLEXPEN) 100 unit/mL InPn pen Up to 20 units 3 times daily with meals; follow physician instructions 1 Box 5    insulin glargine (BASAGLAR KWIKPEN) 100 unit/mL (3 mL) InPn pen Inject 90 Units into the skin 2 (two) times daily. 90 units SQ QAM, 90 units SQ QPM      levothyroxine (SYNTHROID) 75 MCG tablet Take 75 mcg by mouth once daily.      loratadine (CLARITIN) 10 mg tablet Take 10 mg by mouth once daily.      lorazepam (ATIVAN) 1 MG tablet Take 1 mg by mouth 2 (two) times daily.      sodium bicarbonate 650 MG tablet Take 650 mg by mouth once daily.       vilazodone (VIIBRYD) 40 mg Tab tablet Take 40 mg by mouth once daily.       Scheduled Meds:   amLODIPine  10 mg Oral Daily    atorvastatin  20 mg Oral Daily    cetirizine  10 mg Oral Daily    famotidine  20 mg Oral Daily    furosemide  40 mg Oral Daily    heparin (porcine)  5,000 Units Subcutaneous Q8H    levothyroxine  75 mcg Oral Before breakfast    LORazepam  1 mg Oral BID    pantoprazole  40 mg Oral Daily    sodium bicarbonate  650 mg Oral Daily    vilazodone  40 mg Oral Daily     Continuous Infusions:   sodium chloride 0.9% 75 mL/hr at 04/03/18 0015     PRN Meds:.acetaminophen, dextrose 50%, glucagon (human recombinant), hydrALAZINE, insulin aspart U-100, senna-docusate 8.6-50 mg, sulfur hexafluoride microspheres    Allergies:  Codeine    Past Family  "History:  Reviewed; refer to Hospitalist Admission Note    Review of Systems:  Review of Systems - All 14 systems reviewed and negative, except as noted in HPI    Physical Exam:  BP (!) 176/78 (BP Location: Right arm, Patient Position: Lying)   Pulse 80   Temp 96.4 °F (35.8 °C) (Oral)   Resp 16   Ht 5' 7" (1.702 m)   Wt 106.6 kg (235 lb)   LMP 02/28/2012   SpO2 96%   Breastfeeding? No   BMI 36.81 kg/m²     INS/OUTS:  No intake/output data recorded.  No intake/output data recorded.    General Appearance:    NAD, AAO x 3, cooperative, appears stated age   Head:    Normocephalic, atraumatic   Eyes:    PER, EOMI, and conjunctiva/sclera clear bilaterally        Throat:   Moist mucus membranes, no thrush or oral lesions, normal      dentition   Back:     No CVA tenderness   Lungs:     Clear to auscultation bilaterally, no wheeze, crackles, rales      or rhonchi, symmetric air movement, respirations unlabored   Heart:    Regular rate and rhythm, S1 and S2 normal, no murmur, rub   or gallop   Abdomen:     Soft, non-tender, non-distended, bowel sounds active all four   quadrants, no RT or guarding, no masses, no organomegaly   Extremities:   Warm and well perfused, distal pulses intact, no cyanosis or    peripheral edema   MSK:   No joint or muscle swelling, tenderness or deformity   Skin:   Skin color, texture, turgor normal, no rashes or lesions   Neurologic/Psychiatric:   CNII-XII intact, normal strength and sensation       throughout, no asterixis; normal affect, memory, judgement     and insight     Results:  Lab Results   Component Value Date     04/03/2018    K 3.6 04/03/2018     04/03/2018    CO2 24 04/03/2018    BUN 65 (H) 04/03/2018    CREATININE 3.5 (H) 04/03/2018    CALCIUM 10.8 (H) 04/03/2018    ANIONGAP 13 04/03/2018    ESTGFRAFRICA 16 (A) 04/03/2018    EGFRNONAA 13 (A) 04/03/2018       Lab Results   Component Value Date    CALCIUM 10.8 (H) 04/03/2018    PHOS 4.7 (H) 01/29/2018 "         Recent Labs  Lab 04/03/18  0406   WBC 10.30   RBC 3.72*   HGB 10.4*   HCT 31.2*      MCV 84   MCH 27.9   MCHC 33.2       I have personally reviewed pertinent radiological imaging and reports.    Dolores Rodney MD MPH  [facility] [place]  [address] [place] [address]  MISSAEL Black [address]  [phone] (p)  [phone] (f)

## 2018-04-03 NOTE — PT/OT/SLP EVAL
Physical Therapy Evaluation    Patient Name:  Andra Newell   MRN:  [id number]    Recommendations:     Discharge Recommendations:  home health PT   Discharge Equipment Recommendations: none   Barriers to discharge: None    Assessment:     Andra Newell is a 58 y.o. female admitted with a medical diagnosis of Chest pain.  She presents with the following impairments/functional limitations:  weakness, impaired endurance, impaired functional mobilty, gait instability, decreased lower extremity function, decreased upper extremity function, impaired cardiopulmonary response to activity . Pt with onset of increased weakness/fatigue x 2 days. Currently presents with difficulty mobilizing and a high fall risk . Pt will  benefit from PT.    Rehab Prognosis:  fair; patient would benefit from acute skilled PT services to address these deficits and reach maximum level of function.      Recent Surgery: * No surgery found *      Plan:     During this hospitalization, patient to be seen 6 x/week to address the above listed problems via gait training, therapeutic activities, therapeutic exercises  · Plan of Care Expires:  04/27/18   Plan of Care Reviewed with:      Subjective     Communicated with nurse luevano prior to session.  Patient found supine upon PT entry to room, agreeable to evaluation.    Pt stated on kidney  transplant list at [facility]   Stated has a shunt L arm but has not started dialysis    Chief Complaint: dry mouth- strict NPO  Patient comments/goals: get walking again  Pain/Comfort:  · Pain Rating 1: 0/10    Patients cultural, spiritual, Advent conflicts given the current situation:      Living Environment:  Home with partner  Prior to admission, patients level of function was independent till 2 days ago.  Patient has the following equipment: none.  DME owned (not currently used): rolling walker.  Upon discharge, patient will have assistance from partner.    Objective:     Patient found with:    IV pole/telemetry    General Precautions: Standard, fall   Orthopedic Precautions:N/A   Braces: N/A     Exams:  · RLE ROM: WFL  · RLE Strength: 3+/5  · LLE ROM: WFL  · LLE Strength: Deficits: 3/5    Functional Mobility:  · Bed Mobility:     · Rolling Right: independence  · Supine to Sit: stand by assistance  · Sit to Supine: minimum assistance  · Transfers:     · Sit to Stand:  contact guard assistance with rolling walker  · Gait: 50ft with RW with several knee giveaway mod assist        Therapeutic Activities and Exercises:   thera ex in bed  EOB sitting with extra time due to lightheadedness  Gait to hallways with RW with min/mod assist- increased assist upon return to room due to fatigue and LE's giveaway  Back to bed with fatigue  Encouraged GS and AP exercises    Patient left HOB elevated with all lines intact, call button in reach and nurse Cassi notified.  Partner at bedside and supportive    GOALS:    Physical Therapy Goals        Problem: Physical Therapy Goal    Goal Priority Disciplines Outcome Goal Variances Interventions   Physical Therapy Goal     PT/OT, PT Ongoing (interventions implemented as appropriate)     Description:  Goals to be met by: 2018     Patient will increase functional independence with mobility by performin. Sit to stand transfer with Stand-by Assistance  2. Bed to chair transfer with Stand-by Assistance   3. Gait  x 250 feet with Contact Guard Assistance using Rolling Walker/HHA  4. Lower extremity exercise program x20 reps per handout, with assistance as needed                      History:     Past Medical History:   Diagnosis Date    Anemia     Arthritis     Asthma     allergic airway    Depression     Diabetes mellitus     Eosinophilia     Gout     Gout     Hyperlipidemia     Hypertension     Low back pain     MRSA carrier     Obesity     Renal disorder     Rotator cuff syndrome--left     Thyroid disease     Ulnar neuropathy of right upper  extremity        Past Surgical History:   Procedure Laterality Date    ACHILLES TENDON SURGERY Left May 2015    BACK SURGERY      CHOLECYSTECTOMY      COLONOSCOPY N/A 9/6/2017    Procedure: COLONOSCOPY;  Surgeon: Marisol Bryson MD;  Location[facility];  Service: Endoscopy;  Laterality: N/A;    DIALYSIS FISTULA CREATION      HEMORRHOID SURGERY      JOINT REPLACEMENT      left    KNEE SURGERY      SHOULDER ARTHROSCOPY      SHOULDER SURGERY         Clinical Decision Making:     History  Co-morbidities and personal factors that may impact the plan of care Examination  Body Structures and Functions, activity limitations and participation restrictions that may impact the plan of care Clinical Presentation   Decision Making/ Complexity Score   Co-morbidities:   [] Time since onset of injury / illness / exacerbation  [] Status of current condition  []Patient's cognitive status and safety concerns    [] Multiple Medical Problems (see med hx)  Personal Factors:   [] Patient's age  [] Prior Level of function   [] Patient's home situation (environment and family support)  [] Patient's level of motivation  [] Expected progression of patient      HISTORY:(criteria)    [] 00712 - no personal factors/history    [] 74356 - has 1-2 personal factor/comorbidity     [] 97114 - has >3 personal factor/comorbidity     Body Regions:  [] Objective examination findings  [] Head     []  Neck  [] Trunk   [] Upper Extremity  [] Lower Extremity    Body Systems:  [] For communication ability, affect, cognition, language, and learning style: the assessment of the ability to make needs known, consciousness, orientation (person, place, and time), expected emotional /behavioral responses, and learning preferences (eg, learning barriers, education  needs)  [] For the neuromuscular system: a general assessment of gross coordinated movement (eg, balance, gait, locomotion, transfers, and transitions) and motor function  (motor control and  motor learning)  [] For the musculoskeletal system: the assessment of gross symmetry, gross range of motion, gross strength, height, and weight  [] For the integumentary system: the assessment of pliability(texture), presence of scar formation, skin color, and skin integrity  [] For cardiovascular/pulmonary system: the assessment of heart rate, respiratory rate, blood pressure, and edema     Activity limitations:    [] Patient's cognitive status and saf ety concerns          [] Status of current condition      [] Weight bearing restriction  [] Cardiopulmunary Restriction    Participation Restrictions:   [] Goals and goal agreement with the patient     [] Rehab potential (prognosis) and probable outcome      Examination of Body System: (criteria)    [] 14425 - addressing 1-2 elements    [] 34723 - addressing a total of 3 or more elements     [] 45554 -  Addressing a total of 4 or more elements         Clinical Presentation: (criteria)  Choose one     On examination of body system using standardized tests and measures patient presents with (CHOOSE ONE) elements from any of the following: body structures and functions, activity limitations, and/or participation restrictions.  Leading to a clinical presentation that is considered (CHOOSE ONE)                              Clinical Decision Making  (Eval Complexity):  Choose One     Time Tracking:     PT Received On: 04/03/18  PT Start Time: 0934     PT Stop Time: 0958  PT Total Time (min): 24 min     Billable Minutes: Evaluation 10 and Gait Training 14      Valarie Adam, PT  04/03/2018

## 2018-04-04 VITALS
DIASTOLIC BLOOD PRESSURE: 75 MMHG | HEIGHT: 67 IN | OXYGEN SATURATION: 95 % | HEART RATE: 78 BPM | BODY MASS INDEX: 36.88 KG/M2 | TEMPERATURE: 98 F | RESPIRATION RATE: 17 BRPM | SYSTOLIC BLOOD PRESSURE: 164 MMHG | WEIGHT: 235 LBS

## 2018-04-04 LAB
ALBUMIN SERPL BCP-MCNC: 3.1 G/DL
ALP SERPL-CCNC: 115 U/L
ALT SERPL W/O P-5'-P-CCNC: 18 U/L
ANION GAP SERPL CALC-SCNC: 11 MMOL/L
AORTIC VALVE REGURGITATION: ABNORMAL
AST SERPL-CCNC: 16 U/L
BACTERIA UR CULT: NORMAL
BASOPHILS # BLD AUTO: 0 K/UL
BASOPHILS NFR BLD: 0.5 %
BILIRUB SERPL-MCNC: 0.3 MG/DL
BUN SERPL-MCNC: 58 MG/DL
CALCIUM SERPL-MCNC: 9.7 MG/DL
CHLORIDE SERPL-SCNC: 109 MMOL/L
CO2 SERPL-SCNC: 23 MMOL/L
CREAT SERPL-MCNC: 3.7 MG/DL
DIASTOLIC DYSFUNCTION: YES
DIFFERENTIAL METHOD: ABNORMAL
EOSINOPHIL # BLD AUTO: 0.5 K/UL
EOSINOPHIL NFR BLD: 5.4 %
ERYTHROCYTE [DISTWIDTH] IN BLOOD BY AUTOMATED COUNT: 16.4 %
EST. GFR  (AFRICAN AMERICAN): 15 ML/MIN/1.73 M^2
EST. GFR  (NON AFRICAN AMERICAN): 13 ML/MIN/1.73 M^2
GLUCOSE SERPL-MCNC: 154 MG/DL
HCT VFR BLD AUTO: 29.3 %
HGB BLD-MCNC: 9.9 G/DL
LYMPHOCYTES # BLD AUTO: 2.4 K/UL
LYMPHOCYTES NFR BLD: 27.5 %
MCH RBC QN AUTO: 28.2 PG
MCHC RBC AUTO-ENTMCNC: 33.6 G/DL
MCV RBC AUTO: 84 FL
MONOCYTES # BLD AUTO: 0.6 K/UL
MONOCYTES NFR BLD: 6.5 %
NEUTROPHILS # BLD AUTO: 5.2 K/UL
NEUTROPHILS NFR BLD: 60.1 %
PHOSPHATE SERPL-MCNC: 4 MG/DL
PLATELET # BLD AUTO: 179 K/UL
PMV BLD AUTO: 8.3 FL
POTASSIUM SERPL-SCNC: 3.9 MMOL/L
PROT SERPL-MCNC: 6.7 G/DL
RBC # BLD AUTO: 3.49 M/UL
RETIRED EF AND QEF - SEE NOTES: 55 (ref 55–65)
SODIUM SERPL-SCNC: 143 MMOL/L
WBC # BLD AUTO: 8.7 K/UL

## 2018-04-04 PROCEDURE — 80053 COMPREHEN METABOLIC PANEL: CPT | Mod: NTX

## 2018-04-04 PROCEDURE — 25000003 PHARM REV CODE 250: Mod: NTX | Performed by: INTERNAL MEDICINE

## 2018-04-04 PROCEDURE — 97116 GAIT TRAINING THERAPY: CPT | Mod: NTX

## 2018-04-04 PROCEDURE — 25000003 PHARM REV CODE 250: Mod: NTX | Performed by: EMERGENCY MEDICINE

## 2018-04-04 PROCEDURE — 99225 PR SUBSEQUENT OBSERVATION CARE,LEVEL II: CPT | Mod: NTX,,, | Performed by: INTERNAL MEDICINE

## 2018-04-04 PROCEDURE — 84100 ASSAY OF PHOSPHORUS: CPT | Mod: NTX

## 2018-04-04 PROCEDURE — 63600175 PHARM REV CODE 636 W HCPCS: Mod: NTX | Performed by: EMERGENCY MEDICINE

## 2018-04-04 PROCEDURE — G0378 HOSPITAL OBSERVATION PER HR: HCPCS | Mod: NTX

## 2018-04-04 PROCEDURE — 36415 COLL VENOUS BLD VENIPUNCTURE: CPT | Mod: NTX

## 2018-04-04 PROCEDURE — 85025 COMPLETE CBC W/AUTO DIFF WBC: CPT | Mod: NTX

## 2018-04-04 PROCEDURE — 96372 THER/PROPH/DIAG INJ SC/IM: CPT

## 2018-04-04 PROCEDURE — 94761 N-INVAS EAR/PLS OXIMETRY MLT: CPT | Mod: NTX

## 2018-04-04 RX ORDER — HYDRALAZINE HYDROCHLORIDE 25 MG/1
25 TABLET, FILM COATED ORAL EVERY 8 HOURS
Qty: 90 TABLET | Refills: 0 | Status: SHIPPED | OUTPATIENT
Start: 2018-04-04 | End: 2018-07-02 | Stop reason: SDUPTHER

## 2018-04-04 RX ORDER — HYDRALAZINE HYDROCHLORIDE 25 MG/1
25 TABLET, FILM COATED ORAL EVERY 8 HOURS
Status: DISCONTINUED | OUTPATIENT
Start: 2018-04-04 | End: 2018-04-04 | Stop reason: HOSPADM

## 2018-04-04 RX ADMIN — HEPARIN SODIUM 5000 UNITS: 5000 INJECTION, SOLUTION INTRAVENOUS; SUBCUTANEOUS at 06:04

## 2018-04-04 RX ADMIN — VILAZODONE HYDROCHLORIDE 40 MG: 10 TABLET ORAL at 09:04

## 2018-04-04 RX ADMIN — HYDRALAZINE HYDROCHLORIDE 25 MG: 25 TABLET, FILM COATED ORAL at 12:04

## 2018-04-04 RX ADMIN — LEVOTHYROXINE SODIUM 75 MCG: 25 TABLET ORAL at 06:04

## 2018-04-04 RX ADMIN — HYDRALAZINE HYDROCHLORIDE 10 MG: 10 TABLET, FILM COATED ORAL at 06:04

## 2018-04-04 RX ADMIN — AMLODIPINE BESYLATE 10 MG: 5 TABLET ORAL at 09:04

## 2018-04-04 RX ADMIN — FAMOTIDINE 20 MG: 20 TABLET, FILM COATED ORAL at 09:04

## 2018-04-04 RX ADMIN — ATORVASTATIN CALCIUM 20 MG: 20 TABLET, FILM COATED ORAL at 09:04

## 2018-04-04 RX ADMIN — FUROSEMIDE 40 MG: 40 TABLET ORAL at 09:04

## 2018-04-04 RX ADMIN — LORAZEPAM 1 MG: 1 TABLET ORAL at 09:04

## 2018-04-04 RX ADMIN — SODIUM BICARBONATE 650 MG TABLET 650 MG: at 09:04

## 2018-04-04 RX ADMIN — PANTOPRAZOLE SODIUM 40 MG: 40 TABLET, DELAYED RELEASE ORAL at 09:04

## 2018-04-04 RX ADMIN — CETIRIZINE HYDROCHLORIDE 10 MG: 10 TABLET, FILM COATED ORAL at 09:04

## 2018-04-04 NOTE — DISCHARGE SUMMARY
Discharge Summary  Hospital Medicine    Admit Date: 4/2/2018    Date and Time: 4/4/20183:45 PM    Discharge Attending Physician: Patrick Jordan MD    Primary Care Physician: Diony Alvarado MD    Diagnoses:  Active Hospital Problems    Diagnosis  POA    *Chest pain [R07.9]  Yes    Hypercalcemia [E83.52]  Yes    Dysuria [R30.0]  Yes    Exertional dyspnea [R06.09]  Yes    Anemia in stage 5 chronic kidney disease, not on chronic dialysis [N18.5, D63.1]  Yes    HLD (hyperlipidemia) [E78.5]  Yes     Chronic    ESRD (end stage renal disease) [N18.6]  Yes    Hypothyroidism [E03.9]  Yes    Essential hypertension [I10]  Yes    Uncontrolled type 2 diabetes mellitus with stage 5 chronic kidney disease not on chronic dialysis, with long-term current use of insulin [E11.22, E11.65, N18.5, Z79.4]  Not Applicable      Resolved Hospital Problems    Diagnosis Date Resolved POA   No resolved problems to display.     Discharged Condition: Good    Hospital Course:   Ms. Newell is a 57yo WF with a PMH HTN, DM2, Hypothyroidism, Depression, Gout, Interstitial Nephritis, and ESRD awaiting HD and renal transplant. She had a dialysis graft placed 6 months ago, but has not started dialysis. Dr. Heaton is her nephrologist. She presents to the ED with c/o Chest pain. It started in the morning and had been intermittent, mid-sternal, and non-radiating. It's sharp with a heaviness. She reported having chest pain intermittently for a few months, but not this bad and it did not feel like her influx. She reported having a stress test for renal transplant and it was normal. Associated symptoms include SOB, HA, nausea, and generalized weakness. The SOB occured with exertion and was getting worst. The HA and nausea are chronic for her. She had also been having dysuria with bilateral flank and lower abdominal. The pain was a dull ache and worst with movement. She was last treated for a UTI 2 months ago and she had plenty burning when she  urinated. While in the ED, her Ca was high at 10.9 and she was bolused with IVF. Her troponin and UA were unremarkable. A KUB was also done. She currently denied pain or discomforts. Patient was admitted to Hospitalist medicine service. Patient was evaluated by Dr. Rodney and Dr. Barrett. Serial cardiac enzymes remained negative. No further cardiac work up recommended. Serum Calcium improved with IVF hydration and discontinuation of Calcitriol. Patient to continue close follow up with her PCP and nephrologist in future. Patient was discharged home in stable condition with following discharge plan of care.     Consults: Dr. Rodney and Dr. Barrett    Significant Diagnostic Studies:     Dobutamine Stress ECHO Test 7/26/17: CONCLUSIONS     1 - Concentric remodeling.     2 - No wall motion abnormalities.     3 - Normal left ventricular systolic function (EF 60-65%).     4 - Normal left ventricular diastolic function.     5 - Right ventricular enlargement with normal systolic function.     6 - Pulmonary hypertension. The estimated PA systolic pressure is 41 mmHg.     7 - Difficult windows, Optison contrast used for wall motion analysis.     No evidence of stress induced myocardial ischemia.      KUB: No acute findings.     CXR: Mild right infrahilar stranding without consolidation.     Renal US: Features of medical renal disease.  Small right renal cysts.  No hydronephrosis.    ECHO:    1 - Concentric hypertrophy.     2 - No wall motion abnormalities.     3 - Normal left ventricular systolic function (EF 55-60%).     4 - Impaired LV relaxation, normal LAP (grade 1 diastolic dysfunction).     5 - Normal right ventricular systolic function .     6 - Mild aortic regurgitation.     Microbiology Results (last 7 days)     Procedure Component Value Units Date/Time    Urine culture [[id number]] Collected:  04/02/18 1823    Order Status:  Completed Specimen:  Urine from Urine, Clean Catch Updated:  04/04/18 1746     Urine Culture,  Routine No significant growth        Special Treatments/Procedures: None  Disposition: Home or Self Care    Medications:  Reconciled Home Medications: Current Discharge Medication List      START taking these medications    Details   hydrALAZINE (APRESOLINE) 25 MG tablet Take 1 tablet (25 mg total) by mouth every 8 (eight) hours.  Qty: 90 tablet, Refills: 0         CONTINUE these medications which have NOT CHANGED    Details   amlodipine (NORVASC) 10 MG tablet Take 10 mg by mouth once daily.      atorvastatin (LIPITOR) 20 MG tablet Take 20 mg by mouth once daily.      famotidine (PEPCID) 20 MG tablet Take 20 mg by mouth once daily at 6am.      furosemide (LASIX) 40 MG tablet Take 40 mg by mouth once daily.      insulin aspart (NOVOLOG FLEXPEN) 100 unit/mL InPn pen Up to 20 units 3 times daily with meals; follow physician instructions  Qty: 1 Box, Refills: 5      insulin glargine (BASAGLAR KWIKPEN) 100 unit/mL (3 mL) InPn pen Inject 90 Units into the skin 2 (two) times daily. 90 units SQ QAM, 90 units SQ QPM      levothyroxine (SYNTHROID) 75 MCG tablet Take 75 mcg by mouth once daily.      loratadine (CLARITIN) 10 mg tablet Take 10 mg by mouth once daily.      lorazepam (ATIVAN) 1 MG tablet Take 1 mg by mouth 2 (two) times daily.      sodium bicarbonate 650 MG tablet Take 650 mg by mouth once daily.       vilazodone (VIIBRYD) 40 mg Tab tablet Take 40 mg by mouth once daily.         STOP taking these medications       calcitRIOL (ROCALTROL) 0.5 MCG Cap Comments:   Reason for Stopping:               Discharge Procedure Orders  Diet diabetic     Diet Cardiac     Diet renal     Activity as tolerated   Order Comments: Observe fall precautions.     Call MD for:   Order Comments: For worsening symptoms, chest pain, shortness of breath, increased abdominal pain, high grade fever, stroke or stroke like symptoms, immediately go to the nearest Emergency Room or call 911 as soon as possible.       Follow-up Information     Diony  VICTORIANO Alvarado MD In 1 week.    Specialty:  Internal Medicine  Contact information:  [address] [place] [address] [address]  [place] [address]  [phone]             Dolores Rodney MD In 2 weeks.    Specialty:  Nephrology  Contact information:  Tina [address]  [facility]  [place] [address]  [phone]             Please follow up.    Contact information:  Have BMP checked after 1 week.

## 2018-04-04 NOTE — PLAN OF CARE
Problem: Patient Care Overview  Goal: Plan of Care Review  Outcome: Ongoing (interventions implemented as appropriate)   04/04/18 4256   Coping/Psychosocial   Plan Of Care Reviewed With patient;significant other   Pt denies pain. NAD noted. POC reviewed w pt and they verbalized understanding. Tele-SR     Pt free from falls, Pt ambulates to the bathroom w assist. Bed in low position, side rails up x 2. Call light in reach,  and wheels locked. Will continue to monitor.

## 2018-04-04 NOTE — PROGRESS NOTES
04/04/18 0910   Patient Assessment/Suction   Level of Consciousness (AVPU) alert   Respiratory Effort Normal;Unlabored   PRE-TX-O2-ETCO2   O2 Device (Oxygen Therapy) room air   SpO2 97 %   Pulse Oximetry Type Intermittent   $ Pulse Oximetry - Multiple Charge Pulse Oximetry - Multiple   Pulse 86   Resp 18

## 2018-04-04 NOTE — PLAN OF CARE
Problem: Physical Therapy Goal  Goal: Physical Therapy Goal  Goals to be met by: 2018     Patient will increase functional independence with mobility by performin. Sit to stand transfer with Stand-by Assistance  2. Bed to chair transfer with Stand-by Assistance   3. Gait  x 250 feet with Contact Guard Assistance using Rolling Walker/HHA  4. Lower extremity exercise program x20 reps per handout, with assistance as needed     Outcome: Ongoing (interventions implemented as appropriate)  Pt ambulated 100 ft with RW with no leg giveaway- educated on thera ex

## 2018-04-04 NOTE — PT/OT/SLP PROGRESS
Physical Therapy Treatment    Patient Name:  Andra Newell   MRN:  [id number]    Recommendations:     Discharge Recommendations:  home health PT   Discharge Equipment Recommendations: none   Barriers to discharge: None    Assessment:     Andra Newell is a 58 y.o. female admitted with a medical diagnosis of Chest pain.  She presents with the following impairments/functional limitations:  weakness, impaired endurance, impaired self care skills, impaired functional mobilty, gait instability, decreased lower extremity function . Pt with improving mobility with increased gait distance..    Rehab Prognosis:  fair; patient would benefit from acute skilled PT services to address these deficits and reach maximum level of function.      Recent Surgery: * No surgery found *      Plan:     During this hospitalization, patient to be seen 6 x/week to address the above listed problems via gait training, therapeutic activities, therapeutic exercises  · Plan of Care Expires:  04/27/18   Plan of Care Reviewed with: patient, significant other    Subjective     Communicated with nurse Ye prior to session.  Patient found supine upon PT entry to room, agreeable to treatment.    Pt stated feeling better and stronger today  SO stated pt not as active at home, uses scooter for grocery    Chief Complaint: RLE weakness  Patient comments/goals: get well  Pain/Comfort:  · Pain Rating 1: 0/10    Patients cultural, spiritual, Faith conflicts given the current situation:      Objective:     Patient found with: telemetry, peripheral IV     General Precautions: Standard, fall   Orthopedic Precautions:N/A   Braces: N/A     Functional Mobility:  · Bed Mobility:     · Supine to Sit: independence  · Sit to Supine: independence  · Transfers:     · Sit to Stand:  independence with no AD  · Gait: 100ft with RW min assist- slow tawanna, narrow base of support- 1 standing rest at nursing station          Therapeutic Activities and  Exercises:  EOB sitting, gown changed to bigger gown   gait training at hallways with RW with VC for gait safety  Stood at nurses station for brief rest  Back to room and left seated EOB  Instructions for GS and AP    Patient left EOB with all lines intact, call button in reach and nurse Cassi notified..    GOALS:    Physical Therapy Goals        Problem: Physical Therapy Goal    Goal Priority Disciplines Outcome Goal Variances Interventions   Physical Therapy Goal     PT/OT, PT Ongoing (interventions implemented as appropriate)     Description:  Goals to be met by: 2018     Patient will increase functional independence with mobility by performin. Sit to stand transfer with Stand-by Assistance  2. Bed to chair transfer with Stand-by Assistance   3. Gait  x 250 feet with Contact Guard Assistance using Rolling Walker/HHA  4. Lower extremity exercise program x20 reps per handout, with assistance as needed                      Time Tracking:     PT Received On: 18  PT Start Time: 958     PT Stop Time: 1013  PT Total Time (min): 15 min     Billable Minutes: Gait Training 15    Treatment Type: Treatment  PT/PTA: PT           Valarie Adam, PT  2018

## 2018-04-04 NOTE — PROGRESS NOTES
INPATIENT NEPHROLOGY PROGRESS NOTE  [facility] NEPHROLOGY    Patient Name: Andra Newell  Date: 04/04/2018    Reason for consultation:  SACHIN    Chief Complaint:   Chief Complaint   Patient presents with    Chest Pain     off and on since this am.       History of Present Illness:  59 y/o F with hx of HTN, HLD, DM and stage IV CKD 2/2 DM (last seen by Dr. FRITZ in 2/2018- Cr 2.8, eGFR 18; 2/22/18 Cr 3.4) who p/w chest pain. She was noted to have SACHIN and hyperCa. We have been consulted for SACHIN with metabolic derangements. Notable home meds: calcitriol     · Interval History/Subjective:    - -190s, on RA, UOp 500cc  - echo is pending  - renal u/s- 10cm kidneys, no obstruction  - no cp, dyspnea, abd pain, edema, problems with urination    · Review of Systems: All 14 systems reviewed and negative, except as noted in HPI    Plan of Care:    Assessment:  Chest pain- stable trop, echo is pending  AoCKI, baseline stage IV CKD  Flank pain  HTN  Hypokalemia  Acidosis  Hypercalcemia/SHPT  Anemia of CKD     Plan:     1. Acute on Chronic Kidney Injury- Renal function hasn't improved despite treatment of hyperCa- may have ATN +/- underlying CKD progression. Renal u/s reviewed- no evidence of obstruction- no explanation for flank pain (also no evidence of UTI). There is no acute indication for RRT from a metabolic or volume standpoint- LUE shunt is in place and ready for use if needed. No NSAIDs or IV contrast.      2. Volume/Blood Pressure- BP is high. D/C NS IVFs. Increase hydralazine to 25mg TID- can be further adjusted as outpatient. Continue oral lasix. Awaiting echo.     3. Electrolytes/Acid Base- HypoK is better- she can have a banana PRN cramps. Acidosis is at goal- continue oral bicarb at current dose. HyperCa is resolved with NS IVFs- will now d/c due to high BP. Phos is normal. PTH is appropriately suppressed. She is 25 vitamin D deficient but won't replete given risk of hyperCa- told her to get some sun  exposure. Keep calcitriol on hold at discharge.       4. Anemia of CKD- Hb is stable.    Ok for d/c from a renal standpoint pending normal echo.    Thank you for allowing us to participate in this patient's care. We will continue to follow.    Medications:  No current facility-administered medications on file prior to encounter.      Current Outpatient Prescriptions on File Prior to Encounter   Medication Sig Dispense Refill    amlodipine (NORVASC) 10 MG tablet Take 10 mg by mouth once daily.      atorvastatin (LIPITOR) 20 MG tablet Take 20 mg by mouth once daily.      calcitRIOL (ROCALTROL) 0.5 MCG Cap Take 0.5 mcg by mouth once daily.       famotidine (PEPCID) 20 MG tablet Take 20 mg by mouth once daily at 6am.      furosemide (LASIX) 40 MG tablet Take 40 mg by mouth once daily.      insulin aspart (NOVOLOG FLEXPEN) 100 unit/mL InPn pen Up to 20 units 3 times daily with meals; follow physician instructions 1 Box 5    insulin glargine (BASAGLAR KWIKPEN) 100 unit/mL (3 mL) InPn pen Inject 90 Units into the skin 2 (two) times daily. 90 units SQ QAM, 90 units SQ QPM      levothyroxine (SYNTHROID) 75 MCG tablet Take 75 mcg by mouth once daily.      loratadine (CLARITIN) 10 mg tablet Take 10 mg by mouth once daily.      lorazepam (ATIVAN) 1 MG tablet Take 1 mg by mouth 2 (two) times daily.      sodium bicarbonate 650 MG tablet Take 650 mg by mouth once daily.       vilazodone (VIIBRYD) 40 mg Tab tablet Take 40 mg by mouth once daily.       Scheduled Meds:   amLODIPine  10 mg Oral Daily    atorvastatin  20 mg Oral Daily    cetirizine  10 mg Oral Daily    famotidine  20 mg Oral Daily    furosemide  40 mg Oral Daily    heparin (porcine)  5,000 Units Subcutaneous Q8H    hydrALAZINE  10 mg Oral Q8H    levothyroxine  75 mcg Oral Before breakfast    LORazepam  1 mg Oral BID    pantoprazole  40 mg Oral Daily    potassium chloride  40 mEq Oral Once    sodium bicarbonate  650 mg Oral Daily    vilazodone   40 mg Oral Daily     Continuous Infusions:   sodium chloride 0.9% 75 mL/hr at 04/03/18 1145     PRN Meds:.acetaminophen, dextrose 50%, glucagon (human recombinant), hydrALAZINE, insulin aspart U-100, senna-docusate 8.6-50 mg    Allergies:  Codeine    Vital Signs:  Temp Readings from Last 3 Encounters:   04/04/18 96.6 °F (35.9 °C) (Oral)   02/22/18 98.1 °F (36.7 °C) (Oral)   11/03/17 99.3 °F (37.4 °C) (Oral)       Pulse Readings from Last 3 Encounters:   04/04/18 86   02/22/18 70   02/05/18 73       BP Readings from Last 3 Encounters:   04/04/18 (!) 188/85   02/22/18 (!) 151/69   02/05/18 (!) 152/80       Weight:  Wt Readings from Last 3 Encounters:   04/02/18 106.6 kg (235 lb)   02/22/18 106.6 kg (235 lb)   02/05/18 107.9 kg (237 lb 14 oz)       INS/OUTS:  I/O last 3 completed shifts:  In: 2831.3 [P.O.:600; I.V.:2231.3]  Out: 500 [Urine:500]  No intake/output data recorded.    Physical Exam:  Constitutional: nad, aao x 3  Heart: rrr, no m/r/g, wwp, no edema  Lungs: ctab, no w/r/r/c, no lb  Abdomen: s/nt/nd, +BS    Results:  Lab Results   Component Value Date     04/04/2018    K 3.9 04/04/2018     04/04/2018    CO2 23 04/04/2018    BUN 58 (H) 04/04/2018    CREATININE 3.7 (H) 04/04/2018    CALCIUM 9.7 04/04/2018    ANIONGAP 11 04/04/2018    ESTGFRAFRICA 15 (A) 04/04/2018    EGFRNONAA 13 (A) 04/04/2018       Lab Results   Component Value Date    CALCIUM 9.7 04/04/2018    PHOS 4.0 04/04/2018         Recent Labs  Lab 04/04/18  0447   WBC 8.70   RBC 3.49*   HGB 9.9*   HCT 29.3*      MCV 84   MCH 28.2   MCHC 33.6       I have personally reviewed pertinent radiological imaging and reports.    Dolores Rodney MD MPH  [facility] [address]  [place] [address]  [phone] (p)  [phone] (f)

## 2018-04-05 NOTE — PLAN OF CARE
04/05/18 0820   Final Note   Assessment Type Final Discharge Note   Discharge Disposition Home

## 2018-04-12 ENCOUNTER — HOSPITAL ENCOUNTER (OUTPATIENT)
Facility: HOSPITAL | Age: 58
Discharge: HOME | End: 2018-04-14
Attending: EMERGENCY MEDICINE | Admitting: INTERNAL MEDICINE
Payer: COMMERCIAL

## 2018-04-12 DIAGNOSIS — N18.6 END STAGE RENAL DISEASE: ICD-10-CM

## 2018-04-12 DIAGNOSIS — E11.22 UNCONTROLLED TYPE 2 DIABETES MELLITUS WITH STAGE 5 CHRONIC KIDNEY DISEASE NOT ON CHRONIC DIALYSIS, UNSPECIFIED LONG TERM INSULIN USE STATUS: ICD-10-CM

## 2018-04-12 DIAGNOSIS — E83.52 HYPERCALCEMIA: Primary | ICD-10-CM

## 2018-04-12 DIAGNOSIS — R07.9 CHEST PAIN: ICD-10-CM

## 2018-04-12 DIAGNOSIS — R53.1 WEAKNESS: ICD-10-CM

## 2018-04-12 DIAGNOSIS — E11.65 UNCONTROLLED TYPE 2 DIABETES MELLITUS WITH STAGE 5 CHRONIC KIDNEY DISEASE NOT ON CHRONIC DIALYSIS, UNSPECIFIED LONG TERM INSULIN USE STATUS: ICD-10-CM

## 2018-04-12 DIAGNOSIS — N18.5 UNCONTROLLED TYPE 2 DIABETES MELLITUS WITH STAGE 5 CHRONIC KIDNEY DISEASE NOT ON CHRONIC DIALYSIS, UNSPECIFIED LONG TERM INSULIN USE STATUS: ICD-10-CM

## 2018-04-12 LAB
BACTERIA #/AREA URNS HPF: ABNORMAL /HPF
BASOPHILS # BLD AUTO: 0 K/UL
BASOPHILS NFR BLD: 0.4 %
BILIRUB UR QL STRIP: NEGATIVE
CLARITY UR: CLEAR
COLOR UR: YELLOW
DIFFERENTIAL METHOD: ABNORMAL
EOSINOPHIL # BLD AUTO: 0.5 K/UL
EOSINOPHIL NFR BLD: 4.5 %
ERYTHROCYTE [DISTWIDTH] IN BLOOD BY AUTOMATED COUNT: 17 %
GLUCOSE UR QL STRIP: NEGATIVE
HCT VFR BLD AUTO: 32.4 %
HGB BLD-MCNC: 10.7 G/DL
HGB UR QL STRIP: ABNORMAL
HYALINE CASTS #/AREA URNS LPF: 0 /LPF
KETONES UR QL STRIP: NEGATIVE
LEUKOCYTE ESTERASE UR QL STRIP: ABNORMAL
LYMPHOCYTES # BLD AUTO: 2.3 K/UL
LYMPHOCYTES NFR BLD: 18.8 %
MCH RBC QN AUTO: 28.2 PG
MCHC RBC AUTO-ENTMCNC: 33.2 G/DL
MCV RBC AUTO: 85 FL
MICROSCOPIC COMMENT: ABNORMAL
MONOCYTES # BLD AUTO: 0.8 K/UL
MONOCYTES NFR BLD: 6.5 %
NEUTROPHILS # BLD AUTO: 8.4 K/UL
NEUTROPHILS NFR BLD: 69.8 %
NITRITE UR QL STRIP: NEGATIVE
NON-SQ EPI CELLS #/AREA URNS HPF: 2 /HPF
PH UR STRIP: 5 [PH] (ref 5–8)
PLATELET # BLD AUTO: 241 K/UL
PMV BLD AUTO: 8.1 FL
POCT GLUCOSE: 104 MG/DL (ref 70–110)
POCT GLUCOSE: 141 MG/DL (ref 70–110)
PROT UR QL STRIP: ABNORMAL
RBC # BLD AUTO: 3.81 M/UL
RBC #/AREA URNS HPF: 2 /HPF (ref 0–4)
SP GR UR STRIP: 1.02 (ref 1–1.03)
SQUAMOUS #/AREA URNS HPF: 6 /HPF
TROPONIN I SERPL DL<=0.01 NG/ML-MCNC: 0.02 NG/ML
URN SPEC COLLECT METH UR: ABNORMAL
UROBILINOGEN UR STRIP-ACNC: NEGATIVE EU/DL
WBC # BLD AUTO: 12.1 K/UL
WBC #/AREA URNS HPF: 6 /HPF (ref 0–5)

## 2018-04-12 PROCEDURE — 84484 ASSAY OF TROPONIN QUANT: CPT | Mod: NTX

## 2018-04-12 PROCEDURE — G0378 HOSPITAL OBSERVATION PER HR: HCPCS | Mod: NTX

## 2018-04-12 PROCEDURE — 36415 COLL VENOUS BLD VENIPUNCTURE: CPT | Mod: NTX

## 2018-04-12 PROCEDURE — 99284 EMERGENCY DEPT VISIT MOD MDM: CPT | Mod: 25,NTX

## 2018-04-12 PROCEDURE — 82553 CREATINE MB FRACTION: CPT | Mod: NTX

## 2018-04-12 PROCEDURE — 82550 ASSAY OF CK (CPK): CPT | Mod: NTX

## 2018-04-12 PROCEDURE — 93005 ELECTROCARDIOGRAM TRACING: CPT | Mod: NTX

## 2018-04-12 PROCEDURE — 25000003 PHARM REV CODE 250: Mod: NTX | Performed by: NURSE PRACTITIONER

## 2018-04-12 PROCEDURE — 81000 URINALYSIS NONAUTO W/SCOPE: CPT | Mod: NTX

## 2018-04-12 PROCEDURE — 99220 PR INITIAL OBSERVATION CARE,LEVL III: CPT | Mod: NTX,,, | Performed by: INTERNAL MEDICINE

## 2018-04-12 PROCEDURE — 84484 ASSAY OF TROPONIN QUANT: CPT | Mod: 91,NTX

## 2018-04-12 PROCEDURE — 82962 GLUCOSE BLOOD TEST: CPT | Mod: NTX

## 2018-04-12 PROCEDURE — 96360 HYDRATION IV INFUSION INIT: CPT | Mod: NTX

## 2018-04-12 PROCEDURE — 63600175 PHARM REV CODE 636 W HCPCS: Mod: NTX | Performed by: INTERNAL MEDICINE

## 2018-04-12 PROCEDURE — 96372 THER/PROPH/DIAG INJ SC/IM: CPT | Mod: 59

## 2018-04-12 PROCEDURE — 93010 ELECTROCARDIOGRAM REPORT: CPT | Mod: NTX,,, | Performed by: INTERNAL MEDICINE

## 2018-04-12 PROCEDURE — 85025 COMPLETE CBC W/AUTO DIFF WBC: CPT | Mod: NTX

## 2018-04-12 PROCEDURE — 96361 HYDRATE IV INFUSION ADD-ON: CPT | Mod: NTX

## 2018-04-12 RX ORDER — IBUPROFEN 200 MG
16 TABLET ORAL
Status: DISCONTINUED | OUTPATIENT
Start: 2018-04-12 | End: 2018-04-14 | Stop reason: HOSPADM

## 2018-04-12 RX ORDER — IBUPROFEN 200 MG
24 TABLET ORAL
Status: DISCONTINUED | OUTPATIENT
Start: 2018-04-12 | End: 2018-04-14 | Stop reason: HOSPADM

## 2018-04-12 RX ORDER — INSULIN ASPART 100 [IU]/ML
0-5 INJECTION, SOLUTION INTRAVENOUS; SUBCUTANEOUS
Status: DISCONTINUED | OUTPATIENT
Start: 2018-04-12 | End: 2018-04-14 | Stop reason: HOSPADM

## 2018-04-12 RX ORDER — GLUCAGON 1 MG
1 KIT INJECTION
Status: DISCONTINUED | OUTPATIENT
Start: 2018-04-12 | End: 2018-04-14 | Stop reason: HOSPADM

## 2018-04-12 RX ORDER — INSULIN ASPART 100 [IU]/ML
1-10 INJECTION, SOLUTION INTRAVENOUS; SUBCUTANEOUS
Status: DISCONTINUED | OUTPATIENT
Start: 2018-04-12 | End: 2018-04-12

## 2018-04-12 RX ORDER — SODIUM CHLORIDE 9 MG/ML
1000 INJECTION, SOLUTION INTRAVENOUS
Status: COMPLETED | OUTPATIENT
Start: 2018-04-12 | End: 2018-04-12

## 2018-04-12 RX ORDER — HEPARIN SODIUM 5000 [USP'U]/ML
5000 INJECTION, SOLUTION INTRAVENOUS; SUBCUTANEOUS EVERY 12 HOURS
Status: DISCONTINUED | OUTPATIENT
Start: 2018-04-12 | End: 2018-04-14 | Stop reason: HOSPADM

## 2018-04-12 RX ADMIN — SODIUM CHLORIDE 500 ML: 0.9 INJECTION, SOLUTION INTRAVENOUS at 12:04

## 2018-04-12 RX ADMIN — HEPARIN SODIUM 5000 UNITS: 5000 INJECTION, SOLUTION INTRAVENOUS; SUBCUTANEOUS at 09:04

## 2018-04-12 RX ADMIN — SODIUM CHLORIDE 1000 ML: 0.9 INJECTION, SOLUTION INTRAVENOUS at 03:04

## 2018-04-12 NOTE — ED PROVIDER NOTES
"Encounter Date: 4/12/2018    SCRIBE #1 NOTE: I, Gus Mcintosh, am scribing for, and in the presence of, Nika Segovia NP.       History     Chief Complaint   Patient presents with    Fatigue     reports 2 recent falls, BOYER,  symptoms for 1 week. admitted last week for same thing.        04/12/2018 11:38 AM     Chief complaint: Fatigue       Andra Newell is a 58 y.o. female with a hx of HTN, DM, Gout and Thyroid disease who presents to the ED with complaints of generalized weakness for a few days. Associated sx are CP, SOB, and dysuria. Pt reports sleeping 12 hrs at night and naps throughout the day. She reports 2 falls in the past 2 days due to "my legs giving out on me." Pt denies LOC and head trauma. She reports dysuria before and after voiding associated with decrease in urinary output. Pt notes a UTI in the past. She was admitted 04/02 for CP and states the current pain is different. The current pain is intermittent and describes as "more of a heaviness than a pain." Pt also endorsed SOB on exertion. Pt denies cough and rhinorrhea.       The history is provided by the patient.     Review of patient's allergies indicates:   Allergen Reactions    Codeine Itching     Can take oxycodone and hydrocodone with Benadryl     Past Medical History:   Diagnosis Date    Anemia     Arthritis     Asthma     allergic airway    Depression     Diabetes mellitus     Eosinophilia     Gout     Gout     Hyperlipidemia     Hypertension     Low back pain     MRSA carrier     Obesity     Renal disorder     Rotator cuff syndrome--left     Thyroid disease     Ulnar neuropathy of right upper extremity      Past Surgical History:   Procedure Laterality Date    ACHILLES TENDON SURGERY Left May 2015    BACK SURGERY      CHOLECYSTECTOMY      COLONOSCOPY N/A 9/6/2017    Procedure: COLONOSCOPY;  Surgeon: Marisol Bryson MD;  Location: [facility];  Service: Endoscopy;  Laterality: N/A;    DIALYSIS FISTULA " CREATION      HEMORRHOID SURGERY      JOINT REPLACEMENT      left    KNEE SURGERY      SHOULDER ARTHROSCOPY      SHOULDER SURGERY       Family History   Problem Relation Age of Onset    Diabetes Mother     Alzheimer's disease Mother     Thyroid disease Mother     Hyperlipidemia Mother     Heart disease Father     Stroke Father     Diabetes Sister     Lupus Sister     Ovarian cancer Sister     Heart disease Brother      Social History   Substance Use Topics    Smoking status: Never Smoker    Smokeless tobacco: Never Used    Alcohol use No     Review of Systems   Constitutional: Negative for chills and fever.   HENT: Negative for congestion, rhinorrhea and sore throat.    Eyes: Negative for pain and redness.   Respiratory: Positive for shortness of breath. Negative for cough and wheezing.    Cardiovascular: Positive for chest pain. Negative for palpitations.   Gastrointestinal: Negative for abdominal pain, diarrhea and nausea.   Genitourinary: Positive for dysuria. Negative for flank pain, frequency, hematuria and urgency.   Musculoskeletal: Negative for back pain, gait problem, myalgias and neck pain.   Skin: Negative for rash.   Neurological: Positive for weakness. Negative for dizziness, light-headedness and headaches.   Hematological: Does not bruise/bleed easily.       Physical Exam     Initial Vitals [04/12/18 1119]   BP Pulse Resp Temp SpO2   (!) 185/86 99 18 98.2 °F (36.8 °C) 98 %      MAP       119         Physical Exam    Nursing note and vitals reviewed.  Constitutional: Vital signs are normal. She appears well-developed and well-nourished. She is not diaphoretic. She is active. She does not have a sickly appearance. No distress.   HENT:   Head: Normocephalic and atraumatic.   Eyes: Conjunctivae are normal.   Neck: Normal range of motion and full passive range of motion without pain.   Cardiovascular: Normal rate and regular rhythm. Exam reveals no gallop and no friction rub.    Murmur  (systolic) heard.  Fistula to left upper extremity +thrill   Pulmonary/Chest: Breath sounds normal. She has no wheezes. She has no rhonchi. She has no rales.   Abdominal: Soft. Bowel sounds are normal. There is no tenderness.   Musculoskeletal: Normal range of motion.   Neurological: She is alert. Gait normal.   Skin: Skin is warm and dry. Capillary refill takes less than 2 seconds.   Psychiatric: She has a normal mood and affect.         ED Course   Procedures  Labs Reviewed - No data to display          Medical Decision Making:   History:   Old Medical Records: I decided to obtain old medical records.  Clinical Tests:   Lab Tests: Ordered and Reviewed  Radiological Study: Reviewed and Ordered  Medical Tests: Reviewed and Ordered    Imaging Results          X-Ray Chest AP Portable (Final result)  Result time 04/12/18 12:29:11    Final result by Bogdan Manjarrez Jr., MD (04/12/18 12:29:11)                 Impression:      Resolved intrapulmonary infiltrates since the prior study.  Otherwise negative chest      Electronically signed by: Bogdan Manjarrez MD  Date:    04/12/2018  Time:    12:29             Narrative:    EXAMINATION:  XR CHEST AP PORTABLE    CLINICAL HISTORY:  Chest pain, unspecified    TECHNIQUE:  Single frontal view of the chest was performed.    COMPARISON:  Chest x-ray of April 2, 2018.    FINDINGS:  The mediastinal cardiac size and contours are normal.  No intrapulmonary masses or infiltrates are presently seen an improvement since the prior study.  No pneumothorax or pleural effusion is noted.                                 APC / Resident Notes:   Andra Newell is a 58 year old female presenting to the ED with generalized weakness. She is noted to be hypercalcemic again today and was treated with a small IV bolus of normal saline. No evidence of UTI on urinalysis. We attempted to have the patient stand but she was too weak to do so. I spoke with Dr. Jordan who agrees to admit the patient. She  will have nephrology consult for likely initiation of dialysis.        Scribe Attestation:   Scribe #1: I performed the above scribed service and the documentation accurately describes the services I performed. I attest to the accuracy of the note.    I, ROYAL Florez, personally performed the services described in this documentation. All medical record entries made by the scribe were at my direction and in my presence.  I have reviewed the chart and agree that the record reflects my personal performance and is accurate and complete. ROYAL Florez.  6:18 PM 04/12/2018             Clinical Impression:     1. Hypercalcemia    2. Chest pain    3. Weakness    4. End stage renal disease    5. Uncontrolled type 2 diabetes mellitus with stage 5 chronic kidney disease not on chronic dialysis, unspecified long term insulin use status        Disposition:   Disposition: Placed in Observation                        Britt Segovia NP  04/12/18 7909

## 2018-04-12 NOTE — ED NOTES
"Visiting with family/friends x 3. NAD @ rest. Reports "wooziness" with standing. Will continue to monitor  "

## 2018-04-12 NOTE — H&P
"PCP: Diony Alvarado MD    History & Physical    Chief Complaint: Worsening fatigue, frequent falls and SOB    History of Present Illness:  Patient is a 58 y.o. female admitted to Hospitalist Service from [facility] Emergency Room with complaint of worsening fatigue, frequent falls and SOB. Patient reportedly has past medical history significant for HTN, DM2, Hypothyroidism, Depression, Gout, Interstitial Nephritis, and ESRD awaiting HD and renal transplant. She had a dialysis graft placed 6 months ago, but has not started dialysis. Dr. Heaton is her nephrologist. Patient was recently hospitalized for similar complaints (04-04-18 to 04-04-18). Patient presented with complaints of generalized weakness for a few days. Associated sx are CP, SOB, and dysuria. Pt reports sleeping 12 hrs at night and naps throughout the day. She reports 2 falls in the past 2 days due to "my legs giving out on me." Pt denies LOC and head trauma. She reports dysuria before and after voiding associated with decrease in urinary output. Pt notes a UTI in the past. Patient denied abdominal pain, nausea, vomiting, headache, vision changes, focal neuro-deficits, cough or fever.    Past Medical History:   Diagnosis Date    Anemia     Arthritis     Asthma     allergic airway    Depression     Diabetes mellitus     Eosinophilia     Gout     Gout     Hyperlipidemia     Hypertension     Low back pain     MRSA carrier     Obesity     Renal disorder     Rotator cuff syndrome--left     Thyroid disease     Ulnar neuropathy of right upper extremity      Past Surgical History:   Procedure Laterality Date    ACHILLES TENDON SURGERY Left May 2015    BACK SURGERY      CHOLECYSTECTOMY      COLONOSCOPY N/A 9/6/2017    Procedure: COLONOSCOPY;  Surgeon: Marisol Bryson MD;  Location: [facility];  Service: Endoscopy;  Laterality: N/A;    DIALYSIS FISTULA CREATION      HEMORRHOID SURGERY      JOINT REPLACEMENT      left    " KNEE SURGERY      SHOULDER ARTHROSCOPY      SHOULDER SURGERY       Family History   Problem Relation Age of Onset    Diabetes Mother     Alzheimer's disease Mother     Thyroid disease Mother     Hyperlipidemia Mother     Heart disease Father     Stroke Father     Diabetes Sister     Lupus Sister     Ovarian cancer Sister     Heart disease Brother      Social History   Substance Use Topics    Smoking status: Never Smoker    Smokeless tobacco: Never Used    Alcohol use No      Review of patient's allergies indicates:   Allergen Reactions    Codeine Itching     Can take oxycodone and hydrocodone with Benadryl     PTA Medications   Medication Sig    atorvastatin (LIPITOR) 20 MG tablet Take 20 mg by mouth once daily.    famotidine (PEPCID) 20 MG tablet Take 20 mg by mouth once daily at 6am.    hydrALAZINE (APRESOLINE) 25 MG tablet Take 1 tablet (25 mg total) by mouth every 8 (eight) hours.    insulin aspart (NOVOLOG FLEXPEN) 100 unit/mL InPn pen Up to 20 units 3 times daily with meals; follow physician instructions    insulin glargine (BASAGLAR KWIKPEN) 100 unit/mL (3 mL) InPn pen Inject 90 Units into the skin 2 (two) times daily. 90 units SQ QAM, 90 units SQ QPM    levothyroxine (SYNTHROID) 75 MCG tablet Take 75 mcg by mouth once daily.    loratadine (CLARITIN) 10 mg tablet Take 10 mg by mouth once daily.    lorazepam (ATIVAN) 1 MG tablet Take 1 mg by mouth 2 (two) times daily.    sodium bicarbonate 650 MG tablet Take 650 mg by mouth once daily.     vilazodone (VIIBRYD) 40 mg Tab tablet Take 40 mg by mouth once daily.     Review of Systems:  Constitutional: no fever or chills. + worsening weakness, falls  Eyes: no visual changes  Ears, nose, mouth, throat, and face: no nasal congestion or sore throat  Respiratory: see HPI  Cardiovascular: see HPI  Gastrointestinal: no nausea or vomiting, no abdominal pain or change in bowel habits  Genitourinary: no hematuria + dysuria  Integument/breast: no  rash or pruritis  Hematologic/lymphatic: no easy bruising or lymphadenopathy  Musculoskeletal: no arthralgias or myalgias  Neurological: no seizures or tremors.  Behavioral/Psych: no auditory or visual hallucinations  Endocrine: no heat or cold intolerance     OBJECTIVE:     Vital Signs (Most Recent)  Temp: 97.5 °F (36.4 °C) (04/12/18 1622)  Pulse: 91 (04/12/18 1640)  Resp: 16 (04/12/18 1622)  BP: (!) 154/65 (04/12/18 1640)  SpO2: 98 % (04/12/18 1622)    Physical Exam:  General appearance: well developed, appears stated age, morbidly obese female  Head: normocephalic, atraumatic  Eyes:  conjunctivae/corneas clear. PERRL.  Nose: Nares normal. Septum midline.  Throat: lips, mucosa, and tongue normal; teeth and gums normal, no throat erythema.  Neck: supple, symmetrical, trachea midline, no JVD and thyroid not enlarged, symmetric, no tenderness/mass/nodules  Lungs:  clear to auscultation bilaterally and normal respiratory effort  Chest wall: no tenderness  Heart: regular rate and rhythm, S1, S2 normal, SEBASTIAN  Abdomen: soft, non-tender non-distented; bowel sounds normal; no masses,  no organomegaly  Extremities: no cyanosis, clubbing or edema. Fistula to left upper extremity +thrill   Pulses: 2+ and symmetric  Skin: Skin color, texture, turgor normal. No rashes or lesions.  Lymph nodes: Cervical, supraclavicular, and axillary nodes normal.  Neurologic: Normal strength and tone. No focal numbness or weakness. CNII-XII intact.      Laboratory:   CBC:   Recent Labs  Lab 04/12/18  1140   WBC 12.10   RBC 3.81*   HGB 10.7*   HCT 32.4*      MCV 85   MCH 28.2   MCHC 33.2     CMP:   Recent Labs  Lab 04/12/18  0853   *   CALCIUM 11.3*      K 4.4   CO2 23      BUN 78*   CREATININE 4.0*   Cardiac markers:   Recent Labs  Lab 04/12/18  1140   TROPONINI 0.020       Recent Labs  Lab 04/12/18  1233   COLORU Yellow   SPECGRAV 1.020   PHUR 5.0   PROTEINUA 2+*   BACTERIA Few*   NITRITE Negative   LEUKOCYTESUR 1+*    UROBILINOGEN Negative   HYALINECASTS 0     Hemoglobin A1C   Date Value Ref Range Status   04/02/2018 8.0 (H) 4.0 - 5.6 % Final     Comment:     According to ADA guidelines, hemoglobin A1c <7.0% represents  optimal control in non-pregnant diabetic patients. Different  metrics may apply to specific patient populations.   Standards of Medical Care in Diabetes-2016.  For the purpose of screening for the presence of diabetes:  <5.7%     Consistent with the absence of diabetes  5.7-6.4%  Consistent with increasing risk for diabetes   (prediabetes)  >or=6.5%  Consistent with diabetes  Currently, no consensus exists for use of hemoglobin A1c  for diagnosis of diabetes for children.  This Hemoglobin A1c assay has significant interference with fetal   hemoglobin   (HbF). The results are invalid for patients with abnormal amounts of   HbF,   including those with known Hereditary Persistence   of Fetal Hemoglobin. Heterozygous hemoglobin variants (HbAS, HbAC,   HbAD, HbAE, HbA2) do not significantly interfere with this assay;   however, presence of multiple variants in a sample may impact the %   interference.     01/29/2018 8.3 (H) 4.0 - 5.6 % Final     Comment:     According to ADA guidelines, hemoglobin A1c <7.0% represents  optimal control in non-pregnant diabetic patients. Different  metrics may apply to specific patient populations.   Standards of Medical Care in Diabetes-2016.  For the purpose of screening for the presence of diabetes:  <5.7%     Consistent with the absence of diabetes  5.7-6.4%  Consistent with increasing risk for diabetes   (prediabetes)  >or=6.5%  Consistent with diabetes  Currently, no consensus exists for use of hemoglobin A1c  for diagnosis of diabetes for children.  This Hemoglobin A1c assay has significant interference with fetal   hemoglobin   (HbF). The results are invalid for patients with abnormal amounts of   HbF,   including those with known Hereditary Persistence   of Fetal Hemoglobin.  Heterozygous hemoglobin variants (HbAS, HbAC,   HbAD, HbAE, HbA2) do not significantly interfere with this assay;   however, presence of multiple variants in a sample may impact the %   interference.     10/04/2017 8.3 (H) 4.0 - 5.6 % Final     Comment:     According to ADA guidelines, hemoglobin A1c <7.0% represents  optimal control in non-pregnant diabetic patients. Different  metrics may apply to specific patient populations.   Standards of Medical Care in Diabetes-2016.  For the purpose of screening for the presence of diabetes:  <5.7%     Consistent with the absence of diabetes  5.7-6.4%  Consistent with increasing risk for diabetes   (prediabetes)  >or=6.5%  Consistent with diabetes  Currently, no consensus exists for use of hemoglobin A1c  for diagnosis of diabetes for children.  This Hemoglobin A1c assay has significant interference with fetal   hemoglobin   (HbF). The results are invalid for patients with abnormal amounts of   HbF,   including those with known Hereditary Persistence   of Fetal Hemoglobin. Heterozygous hemoglobin variants (HbAS, HbAC,   HbAD, HbAE, HbA2) do not significantly interfere with this assay;   however, presence of multiple variants in a sample may impact the %   interference.       Microbiology Results (last 7 days)     ** No results found for the last 168 hours. **        Diagnostic Results:  Chest X-Ray: Resolved intrapulmonary infiltrates since the prior study.  Otherwise negative chest    ECHO (04-03-18):     1 - Concentric hypertrophy.     2 - No wall motion abnormalities.     3 - Normal left ventricular systolic function (EF 55-60%).     4 - Impaired LV relaxation, normal LAP (grade 1 diastolic dysfunction).     5 - Normal right ventricular systolic function .     6 - Mild aortic regurgitation.     Assessment/Plan:       Exertional dyspnea - likely volume overload due to CKD5     Recent ECHO reviewed.  Obtain Pulse Oximetry at rest and exercise  O2 prn              Hypercalcemia     Likley contributing to weakness  Bolused with IVF in ED  Hold Calcitriol          UTI     Culture urine  Start IV Rocephin.          Essential hypertension     Chronic/Mildly uncontrolled  Continue chronic medications, adjusting as needed  Monitor VS  IV Hydralazine prn         HLD (hyperlipidemia)     Continue chronic statin  Not on chronic ASA         Anemia in stage 5 chronic kidney disease, not on chronic dialysis     Stable  Monitor labs  Transfuse if Hgb <7.0 or symptomatic             ESRD (end stage renal disease)     Consult Nephrology for initiation of HD  Renal diet  Renal dose medications  No NSAIDs/Nephrotoxic agents  Monitor labs             Hypothyroidism     Chronic. Continue home levothyroxine.             Uncontrolled type 2 diabetes mellitus with stage 5 chronic kidney disease not on chronic dialysis, with long-term current use of insulin     Continue home insulin regimen  Monitor blood sugars QAC&HS  SSi prn     VTE Risk Mitigation         Ordered     heparin (porcine) injection 5,000 Units  Every 12 hours     Route:  Subcutaneous        04/12/18 1827     IP VTE HIGH RISK PATIENT  Once      04/12/18 1619     Place sequential compression device  Until discontinued      04/12/18 1619     Place EDUARDO hose  Until discontinued      04/12/18 1619        Patrick Jordan MD  Department of Hospital Medicine   [facility]

## 2018-04-12 NOTE — CONSULTS
" INPATIENT NEPHROLOGY CONSULT   [facility] NEPHROLOGY    Andra Newell  04/12/2018    Reason for consultation:     uremia    Chief Complaint:   Chief Complaint   Patient presents with    Fatigue     reports 2 recent falls, BOYER,  symptoms for 1 week. admitted last week for same thing.           History of Present Illness:    Andra Newell is a 58 y.o. female with a hx of HTN, DM, Gout and Thyroid disease who presents to the ED with complaints of generalized weakness for a few days. Associated sx are CP, SOB, and dysuria. Pt reports sleeping 12 hrs at night and naps throughout the day. She reports 2 falls in the past 2 days due to "my legs giving out on me." Pt denies LOC and head trauma. She reports dysuria before and after voiding associated with decrease in urinary output. She has intermittent bad taste in her mouth.  She is twitchy    Plan of Care:       Assessment:    Progression to esrd with uremia  Hypertension  Hypercalcemia  anemia    Plan:      I have discussed the risks and benefits of hemodialysis with the patient.  All of their questions were answered.  Risks include low blood pressure, stroke, heart attack, seizure, infection, nausea, delirium, and death.    Initiate hemodialysis  Consult for placement  cxr  Repeat pth  Hepatitis panel  ramone with hd  1,25 vit d level        Thank you for allowing us to participate in this patient's care. We will continue to follow.    Vital Signs:  Temp Readings from Last 3 Encounters:   04/12/18 97.5 °F (36.4 °C) (Oral)   04/04/18 98.1 °F (36.7 °C)   02/22/18 98.1 °F (36.7 °C) (Oral)       Pulse Readings from Last 3 Encounters:   04/12/18 91   04/04/18 78   02/22/18 70       BP Readings from Last 3 Encounters:   04/12/18 (!) 154/65   04/04/18 (!) 164/75   02/22/18 (!) 151/69       Weight:  Wt Readings from Last 3 Encounters:   04/12/18 105 kg (231 lb 6.4 oz)   04/02/18 106.6 kg (235 lb)   02/22/18 106.6 kg (235 lb)       Past Medical & Surgical " History:  Past Medical History:   Diagnosis Date    Anemia     Arthritis     Asthma     allergic airway    Depression     Diabetes mellitus     Eosinophilia     Gout     Gout     Hyperlipidemia     Hypertension     Low back pain     MRSA carrier     Obesity     Renal disorder     Rotator cuff syndrome--left     Thyroid disease     Ulnar neuropathy of right upper extremity        Past Surgical History:   Procedure Laterality Date    ACHILLES TENDON SURGERY Left May 2015    BACK SURGERY      CHOLECYSTECTOMY      COLONOSCOPY N/A 9/6/2017    Procedure: COLONOSCOPY;  Surgeon: Marisol Bryson MD;  Location[facility];  Service: Endoscopy;  Laterality: N/A;    DIALYSIS FISTULA CREATION      HEMORRHOID SURGERY      JOINT REPLACEMENT      left    KNEE SURGERY      SHOULDER ARTHROSCOPY      SHOULDER SURGERY         Past Social History:  Social History     Social History    Marital status: Significant Other     Spouse name: N/A    Number of children: N/A    Years of education: N/A     Social History Main Topics    Smoking status: Never Smoker    Smokeless tobacco: Never Used    Alcohol use No    Drug use: No    Sexual activity: No     Other Topics Concern    None     Social History Narrative    None       Medications:  No current facility-administered medications on file prior to encounter.      Current Outpatient Prescriptions on File Prior to Encounter   Medication Sig Dispense Refill    atorvastatin (LIPITOR) 20 MG tablet Take 20 mg by mouth once daily.      famotidine (PEPCID) 20 MG tablet Take 20 mg by mouth once daily at 6am.      hydrALAZINE (APRESOLINE) 25 MG tablet Take 1 tablet (25 mg total) by mouth every 8 (eight) hours. 90 tablet 0    insulin aspart (NOVOLOG FLEXPEN) 100 unit/mL InPn pen Up to 20 units 3 times daily with meals; follow physician instructions 1 Box 5    insulin glargine (BASAGLAR KWIKPEN) 100 unit/mL (3 mL) InPn pen Inject 90 Units into the skin 2 (two)  "times daily. 90 units SQ QAM, 90 units SQ QPM      levothyroxine (SYNTHROID) 75 MCG tablet Take 75 mcg by mouth once daily.      loratadine (CLARITIN) 10 mg tablet Take 10 mg by mouth once daily.      lorazepam (ATIVAN) 1 MG tablet Take 1 mg by mouth 2 (two) times daily.      sodium bicarbonate 650 MG tablet Take 650 mg by mouth once daily.       vilazodone (VIIBRYD) 40 mg Tab tablet Take 40 mg by mouth once daily.      [DISCONTINUED] amlodipine (NORVASC) 10 MG tablet Take 10 mg by mouth once daily.      [DISCONTINUED] furosemide (LASIX) 40 MG tablet Take 40 mg by mouth once daily.       Scheduled Meds:  Continuous Infusions:  PRN Meds:.dextrose 50%, dextrose 50%, glucagon (human recombinant), glucose, glucose, insulin aspart U-100    Allergies:  Codeine    Past Family History:  Reviewed; refer to Hospitalist Admission Note    Review of Systems:  Review of Systems - All 14 systems reviewed and negative, except as noted in HPI    Physical Exam:    BP (!) 154/65 (BP Location: Right arm, Patient Position: Lying)   Pulse 91   Temp 97.5 °F (36.4 °C) (Oral)   Resp 16   Ht 5' 7" (1.702 m)   Wt 105 kg (231 lb 6.4 oz)   LMP 02/28/2012   SpO2 98%   Breastfeeding? No   BMI 36.24 kg/m²     General Appearance:    Alert, cooperative, no distress, appears stated age   Head:    Normocephalic, without obvious abnormality, atraumatic   Eyes:    PER, conjunctiva/corneas clear, EOM's intact in both eyes        Throat:   Lips, mucosa, and tongue normal; teeth and gums normal   Back:     Symmetric, no curvature, ROM normal, no CVA tenderness   Lungs:     Clear to auscultation bilaterally, respirations unlabored   Chest wall:    No tenderness or deformity   Heart:    Regular rate and rhythm, S1 and S2 normal, no murmur, rub   or gallop   Abdomen:     Soft, non-tender, bowel sounds active all four quadrants,     no masses, no organomegaly   Extremities:   Extremities normal, atraumatic, no cyanosis or edema   Pulses:   2+ " and symmetric all extremities   MSK:   No joint or muscle swelling, tenderness or deformity   Skin:   Skin color, texture, turgor normal, no rashes or lesions   Neurologic:   CNII-XII intact, normal strength and sensation       Throughout.  No flap     Results:  Lab Results   Component Value Date     04/12/2018    K 4.4 04/12/2018     04/12/2018    CO2 23 04/12/2018    BUN 78 (H) 04/12/2018    CREATININE 4.0 (H) 04/12/2018    CALCIUM 11.3 (H) 04/12/2018    ANIONGAP 12 04/12/2018    ESTGFRAFRICA 13 (A) 04/12/2018    EGFRNONAA 11 (A) 04/12/2018       Lab Results   Component Value Date    CALCIUM 11.3 (H) 04/12/2018    PHOS 4.0 04/04/2018         Recent Labs  Lab 04/12/18  1140   WBC 12.10   RBC 3.81*   HGB 10.7*   HCT 32.4*      MCV 85   MCH 28.2   MCHC 33.2       I have personally reviewed pertinent radiological imaging and reports.    Chapincito Tom MD  Nephrology  [facility] [place]  [phone]

## 2018-04-13 LAB
ALBUMIN SERPL BCP-MCNC: 3.2 G/DL
ANION GAP SERPL CALC-SCNC: 12 MMOL/L
BASOPHILS # BLD AUTO: 0 K/UL
BASOPHILS NFR BLD: 0.4 %
BUN SERPL-MCNC: 73 MG/DL
CALCIUM SERPL-MCNC: 10.1 MG/DL
CHLORIDE SERPL-SCNC: 108 MMOL/L
CK MB SERPL-MCNC: 1.3 NG/ML
CK MB SERPL-MCNC: 1.6 NG/ML
CK MB SERPL-MCNC: 1.8 NG/ML
CK MB SERPL-RTO: 3 %
CK MB SERPL-RTO: 3.1 %
CK MB SERPL-RTO: 3.2 %
CK SERPL-CCNC: 41 U/L
CK SERPL-CCNC: 51 U/L
CK SERPL-CCNC: 61 U/L
CO2 SERPL-SCNC: 23 MMOL/L
CREAT SERPL-MCNC: 3.6 MG/DL
DIFFERENTIAL METHOD: ABNORMAL
EOSINOPHIL # BLD AUTO: 0.5 K/UL
EOSINOPHIL NFR BLD: 4.7 %
ERYTHROCYTE [DISTWIDTH] IN BLOOD BY AUTOMATED COUNT: 17.5 %
EST. GFR  (AFRICAN AMERICAN): 15 ML/MIN/1.73 M^2
EST. GFR  (NON AFRICAN AMERICAN): 13 ML/MIN/1.73 M^2
GLUCOSE SERPL-MCNC: 118 MG/DL
HBV CORE IGM SERPL QL IA: NEGATIVE
HBV SURFACE AB SER-ACNC: NEGATIVE M[IU]/ML
HBV SURFACE AG SERPL QL IA: NEGATIVE
HCT VFR BLD AUTO: 29.8 %
HGB BLD-MCNC: 9.9 G/DL
LYMPHOCYTES # BLD AUTO: 2.7 K/UL
LYMPHOCYTES NFR BLD: 25 %
MAGNESIUM SERPL-MCNC: 2.2 MG/DL
MCH RBC QN AUTO: 28.3 PG
MCHC RBC AUTO-ENTMCNC: 33.3 G/DL
MCV RBC AUTO: 85 FL
MONOCYTES # BLD AUTO: 0.7 K/UL
MONOCYTES NFR BLD: 6.5 %
NEUTROPHILS # BLD AUTO: 6.8 K/UL
NEUTROPHILS NFR BLD: 63.4 %
PHOSPHATE SERPL-MCNC: 4.8 MG/DL
PLATELET # BLD AUTO: 214 K/UL
PMV BLD AUTO: 8.1 FL
POCT GLUCOSE: 128 MG/DL (ref 70–110)
POCT GLUCOSE: 211 MG/DL (ref 70–110)
POTASSIUM SERPL-SCNC: 3.5 MMOL/L
PTH-INTACT SERPL-MCNC: 42.7 PG/ML
RBC # BLD AUTO: 3.51 M/UL
SODIUM SERPL-SCNC: 143 MMOL/L
TROPONIN I SERPL DL<=0.01 NG/ML-MCNC: 0.02 NG/ML
TROPONIN I SERPL DL<=0.01 NG/ML-MCNC: 0.02 NG/ML
WBC # BLD AUTO: 10.7 K/UL

## 2018-04-13 PROCEDURE — 84484 ASSAY OF TROPONIN QUANT: CPT | Mod: NTX

## 2018-04-13 PROCEDURE — 63600175 PHARM REV CODE 636 W HCPCS: Mod: NTX | Performed by: INTERNAL MEDICINE

## 2018-04-13 PROCEDURE — 87340 HEPATITIS B SURFACE AG IA: CPT | Mod: NTX

## 2018-04-13 PROCEDURE — 85025 COMPLETE CBC W/AUTO DIFF WBC: CPT | Mod: NTX

## 2018-04-13 PROCEDURE — 36415 COLL VENOUS BLD VENIPUNCTURE: CPT | Mod: NTX

## 2018-04-13 PROCEDURE — 86705 HEP B CORE ANTIBODY IGM: CPT | Mod: NTX

## 2018-04-13 PROCEDURE — 80048 BASIC METABOLIC PNL TOTAL CA: CPT | Mod: NTX

## 2018-04-13 PROCEDURE — 82550 ASSAY OF CK (CPK): CPT | Mod: 91,NTX

## 2018-04-13 PROCEDURE — 96372 THER/PROPH/DIAG INJ SC/IM: CPT | Mod: NTX,59

## 2018-04-13 PROCEDURE — 80100016 HC MAINTENANCE HEMODIALYSIS: Mod: NTX

## 2018-04-13 PROCEDURE — 86580 TB INTRADERMAL TEST: CPT | Mod: NTX | Performed by: INTERNAL MEDICINE

## 2018-04-13 PROCEDURE — 25000003 PHARM REV CODE 250: Mod: NTX | Performed by: INTERNAL MEDICINE

## 2018-04-13 PROCEDURE — 99225 PR SUBSEQUENT OBSERVATION CARE,LEVEL II: CPT | Mod: NTX,,, | Performed by: INTERNAL MEDICINE

## 2018-04-13 PROCEDURE — 86706 HEP B SURFACE ANTIBODY: CPT | Mod: NTX

## 2018-04-13 PROCEDURE — 83970 ASSAY OF PARATHORMONE: CPT | Mod: NTX

## 2018-04-13 PROCEDURE — 82553 CREATINE MB FRACTION: CPT | Mod: 91,NTX

## 2018-04-13 PROCEDURE — 82040 ASSAY OF SERUM ALBUMIN: CPT | Mod: NTX

## 2018-04-13 PROCEDURE — 83735 ASSAY OF MAGNESIUM: CPT | Mod: NTX

## 2018-04-13 PROCEDURE — G0257 UNSCHED DIALYSIS ESRD PT HOS: HCPCS | Mod: NTX

## 2018-04-13 PROCEDURE — 82652 VIT D 1 25-DIHYDROXY: CPT | Mod: NTX

## 2018-04-13 PROCEDURE — 82962 GLUCOSE BLOOD TEST: CPT | Mod: NTX

## 2018-04-13 PROCEDURE — G0378 HOSPITAL OBSERVATION PER HR: HCPCS | Mod: NTX

## 2018-04-13 PROCEDURE — 84100 ASSAY OF PHOSPHORUS: CPT | Mod: NTX

## 2018-04-13 RX ORDER — SODIUM CHLORIDE 9 MG/ML
INJECTION, SOLUTION INTRAVENOUS ONCE
Status: DISCONTINUED | OUTPATIENT
Start: 2018-04-13 | End: 2018-04-14

## 2018-04-13 RX ORDER — SODIUM BICARBONATE 650 MG/1
650 TABLET ORAL DAILY
Status: DISCONTINUED | OUTPATIENT
Start: 2018-04-13 | End: 2018-04-14

## 2018-04-13 RX ORDER — SODIUM CHLORIDE 9 MG/ML
INJECTION, SOLUTION INTRAVENOUS
Status: DISCONTINUED | OUTPATIENT
Start: 2018-04-13 | End: 2018-04-14 | Stop reason: HOSPADM

## 2018-04-13 RX ORDER — VILAZODONE HYDROCHLORIDE 10 MG/1
40 TABLET ORAL DAILY
Status: DISCONTINUED | OUTPATIENT
Start: 2018-04-13 | End: 2018-04-13 | Stop reason: SDUPTHER

## 2018-04-13 RX ORDER — HYDRALAZINE HYDROCHLORIDE 25 MG/1
25 TABLET, FILM COATED ORAL EVERY 8 HOURS
Status: DISCONTINUED | OUTPATIENT
Start: 2018-04-13 | End: 2018-04-14

## 2018-04-13 RX ORDER — FAMOTIDINE 20 MG/1
20 TABLET, FILM COATED ORAL 2 TIMES DAILY
Status: DISCONTINUED | OUTPATIENT
Start: 2018-04-13 | End: 2018-04-13 | Stop reason: DRUGHIGH

## 2018-04-13 RX ORDER — ATORVASTATIN CALCIUM 20 MG/1
20 TABLET, FILM COATED ORAL DAILY
Status: DISCONTINUED | OUTPATIENT
Start: 2018-04-13 | End: 2018-04-14 | Stop reason: HOSPADM

## 2018-04-13 RX ORDER — LORAZEPAM 1 MG/1
1 TABLET ORAL 2 TIMES DAILY
Status: DISCONTINUED | OUTPATIENT
Start: 2018-04-13 | End: 2018-04-14 | Stop reason: HOSPADM

## 2018-04-13 RX ORDER — VILAZODONE HYDROCHLORIDE 40 MG/1
40 TABLET ORAL DAILY
Status: DISCONTINUED | OUTPATIENT
Start: 2018-04-13 | End: 2018-04-14 | Stop reason: HOSPADM

## 2018-04-13 RX ORDER — FAMOTIDINE 20 MG/1
20 TABLET, FILM COATED ORAL NIGHTLY
Status: DISCONTINUED | OUTPATIENT
Start: 2018-04-13 | End: 2018-04-14 | Stop reason: HOSPADM

## 2018-04-13 RX ORDER — HYDRALAZINE HYDROCHLORIDE 25 MG/1
25 TABLET, FILM COATED ORAL EVERY 8 HOURS
Status: DISCONTINUED | OUTPATIENT
Start: 2018-04-13 | End: 2018-04-13

## 2018-04-13 RX ORDER — CETIRIZINE HYDROCHLORIDE 10 MG/1
10 TABLET ORAL DAILY
Status: DISCONTINUED | OUTPATIENT
Start: 2018-04-13 | End: 2018-04-14 | Stop reason: HOSPADM

## 2018-04-13 RX ORDER — INSULIN ASPART 100 [IU]/ML
20 INJECTION, SOLUTION INTRAVENOUS; SUBCUTANEOUS
Status: DISCONTINUED | OUTPATIENT
Start: 2018-04-13 | End: 2018-04-13

## 2018-04-13 RX ADMIN — EPOETIN ALFA 5000 UNITS: 20000 SOLUTION INTRAVENOUS; SUBCUTANEOUS at 08:04

## 2018-04-13 RX ADMIN — INSULIN DETEMIR 90 UNITS: 100 INJECTION, SOLUTION SUBCUTANEOUS at 09:04

## 2018-04-13 RX ADMIN — CETIRIZINE HYDROCHLORIDE 10 MG: 10 TABLET, FILM COATED ORAL at 03:04

## 2018-04-13 RX ADMIN — LORAZEPAM 1 MG: 1 TABLET ORAL at 09:04

## 2018-04-13 RX ADMIN — HYDRALAZINE HYDROCHLORIDE 25 MG: 25 TABLET, FILM COATED ORAL at 09:04

## 2018-04-13 RX ADMIN — HYDRALAZINE HYDROCHLORIDE 25 MG: 25 TABLET, FILM COATED ORAL at 04:04

## 2018-04-13 RX ADMIN — HEPARIN SODIUM 5000 UNITS: 5000 INJECTION, SOLUTION INTRAVENOUS; SUBCUTANEOUS at 09:04

## 2018-04-13 RX ADMIN — SODIUM BICARBONATE 650 MG TABLET 650 MG: at 03:04

## 2018-04-13 RX ADMIN — ATORVASTATIN CALCIUM 20 MG: 20 TABLET, FILM COATED ORAL at 03:04

## 2018-04-13 RX ADMIN — FAMOTIDINE 20 MG: 20 TABLET, FILM COATED ORAL at 09:04

## 2018-04-13 RX ADMIN — Medication 5 UNITS: at 01:04

## 2018-04-13 RX ADMIN — VILAZODONE HYDROCHLORIDE 40 MG: 40 TABLET ORAL at 03:04

## 2018-04-13 NOTE — PROGRESS NOTES
"Progress Note  Hospital Medicine  Patient Name:Andra Newell  MRN:  [id number]  Patient Class: OP- Observation  Admit Date: 4/12/2018  Length of Stay: 0 days  Expected Discharge Date:   Attending Physician: Patrick Jordan MD  Primary Care Provider:  Diony Alvarado MD    SUBJECTIVE:     Principal Problem: Worsening fatigue, frequent falls and SOB    Initial history of present illness: Patient is a 58 y.o. female admitted to Hospitalist Service from [facility] Emergency Room with complaint of worsening fatigue, frequent falls and SOB. Patient reportedly has past medical history significant for HTN, DM2, Hypothyroidism, Depression, Gout, Interstitial Nephritis, and ESRD awaiting HD and renal transplant. She had a dialysis graft placed 6 months ago, but has not started dialysis. Dr. Heaton is her nephrologist. Patient was recently hospitalized for similar complaints (04-04-18 to 04-04-18). Patient presented with complaints of generalized weakness for a few days. Associated sx are CP, SOB, and dysuria. Pt reports sleeping 12 hrs at night and naps throughout the day. She reports 2 falls in the past 2 days due to "my legs giving out on me." Pt denies LOC and head trauma. She reports dysuria before and after voiding associated with decrease in urinary output. Pt notes a UTI in the past. Patient denied abdominal pain, nausea, vomiting, headache, vision changes, focal neuro-deficits, cough or fever.    PMH/PSH/SH/FH/Meds: reviewed.    Symptoms/Review of Systems: Reports mild nausea. Tolerated first round of HD well. No shortness of breath, cough, chest pain or headache, fever or abdominal pain.     Diet:  Adequate intake.    Activity level: Up with assistance  Pain:  Patient reports no pain.       OBJECTIVE:   Vital Signs (Most Recent):      Temp: 98.3 °F (36.8 °C) (04/13/18 0825)  Pulse: 84 (04/13/18 1030)  Resp: 18 (04/13/18 0825)  BP: 124/73 (04/13/18 1030)  SpO2: 96 % (04/13/18 0714)       Vital Signs " Range (Last 24H):  Temp:  [96.5 °F (35.8 °C)-98.3 °F (36.8 °C)]   Pulse:  []   Resp:  [16-20]   BP: (124-186)/(60-95)   SpO2:  [94 %-100 %]     Weight: 105 kg (231 lb 6.4 oz)  Body mass index is 36.24 kg/m².    Intake/Output Summary (Last 24 hours) at 04/13/18 1115  Last data filed at 04/12/18 2000   Gross per 24 hour   Intake              120 ml   Output                0 ml   Net              120 ml     Physical Examination:  General appearance: well developed, appears stated age, morbidly obese female  Head: normocephalic, atraumatic  Eyes:  conjunctivae/corneas clear. PERRL.  Nose: Nares normal. Septum midline.  Throat: lips, mucosa, and tongue normal; teeth and gums normal, no throat erythema.  Neck: supple, symmetrical, trachea midline, no JVD and thyroid not enlarged, symmetric, no tenderness/mass/nodules  Lungs:  clear to auscultation bilaterally and normal respiratory effort  Chest wall: no tenderness  Heart: regular rate and rhythm, S1, S2 normal, SEBASTIAN  Abdomen: soft, non-tender non-distented; bowel sounds normal; no masses,  no organomegaly  Extremities: no cyanosis, clubbing or edema. Fistula to left upper extremity +thrill   Pulses: 2+ and symmetric  Skin: Skin color, texture, turgor normal. No rashes or lesions.  Lymph nodes: Cervical, supraclavicular, and axillary nodes normal.  Neurologic: Normal strength and tone. No focal numbness or weakness. CNII-XII intact.       CBC:    Recent Labs  Lab 04/12/18  1140 04/13/18  0413   WBC 12.10 10.70   RBC 3.81* 3.51*   HGB 10.7* 9.9*   HCT 32.4* 29.8*    214   MCV 85 85   MCH 28.2 28.3   MCHC 33.2 33.3   BMP    Recent Labs  Lab 04/12/18  0853 04/13/18  0413   * 118*    143   K 4.4 3.5    108   CO2 23 23   BUN 78* 73*   CREATININE 4.0* 3.6*   CALCIUM 11.3* 10.1   MG  --  2.2      Diagnostic Results:  Microbiology Results (last 7 days)     ** No results found for the last 168 hours. **         Chest X-Ray: Resolved intrapulmonary  infiltrates since the prior study.  Otherwise negative chest     ECHO (04-03-18):     1 - Concentric hypertrophy.     2 - No wall motion abnormalities.     3 - Normal left ventricular systolic function (EF 55-60%).     4 - Impaired LV relaxation, normal LAP (grade 1 diastolic dysfunction).     5 - Normal right ventricular systolic function .     6 - Mild aortic regurgitation.     Assessment/Plan:     Exertional dyspnea - likely volume overload due to CKD5     Recent ECHO reviewed.  Obtain Pulse Oximetry at rest and exercise  O2 prn          Hypercalcemia - Primary Vs Secondary     Likley contributing to weakness  Bolused with IVF in ED  Hold Calcitriol       UTI     Culture urine  On IV Rocephin.          Essential hypertension     Chronic/Mildly uncontrolled  Continue chronic medications, adjusting as needed  Monitor VS  IV Hydralazine prn          HLD (hyperlipidemia)     Continue chronic statin  Not on chronic ASA          Anemia in stage 5 chronic kidney disease, not on chronic dialysis     Stable  Monitor labs  Transfuse if Hgb <7.0 or symptomatic       ESRD (end stage renal disease)     HD as per Dr. Tom.   Renal diet  Renal dose medications  No NSAIDs/Nephrotoxic agents  Monitor labs       Hypothyroidism     Chronic. Continue home levothyroxine.       Uncontrolled type 2 diabetes mellitus with stage 5 chronic kidney disease not on chronic dialysis, with long-term current use of insulin     Continue home insulin regimen  Monitor blood sugars QAC&HS  SSi prn   Discussed with CM who are arranging outpatient HD treatment.    VTE Risk Mitigation         Ordered     heparin (porcine) injection 5,000 Units  As needed (PRN)     Route:  Intravenous        04/13/18 0929     heparin (porcine) injection 5,000 Units  As needed (PRN)     Route:  Intravenous        04/13/18 0738     heparin (porcine) injection 5,000 Units  Every 12 hours     Route:  Subcutaneous        04/12/18 1827     IP VTE HIGH RISK PATIENT  Once       04/12/18 1619     Place sequential compression device  Until discontinued      04/12/18 1619     Place EDUARDO hose  Until discontinued      04/12/18 1619        Patrick Jordan MD  Department of Hospital Medicine   [facility]

## 2018-04-13 NOTE — PLAN OF CARE
Problem: Patient Care Overview  Goal: Plan of Care Review  Pt remained free from injury/falls this shift. VSS- no signs of any distress. Tele NSR. POC reviewed with pt. Pt denied any pain this shift. Pt aware of dialysis tomorrow.Pt repositioned independently, skin intact. Bed locked in lowest position, SR upx2, call light within reach. Will continue to monitor.

## 2018-04-13 NOTE — PLAN OF CARE
Received call from María Elena at [facility] Intake (ext- [phone]); she received the packet. She spoke with [facility] on [address], they have the pt tentatively scheduled for MWF @2:15pm pending Hep labs and insurance verification. I will follow up with María Elena this afternoon regarding insurance and will watch for pt's lab results. Hepatitis labs are sent to [facility] for processing, hers were sent this am....PÉREZ Malcolm       04/13/18 1836   Discharge Reassessment   Assessment Type Discharge Planning Reassessment

## 2018-04-13 NOTE — PLAN OF CARE
Spoke with the pt regarding her outpt dialysis set up; she is requesting [facility] on [address] as her first choice of clinics but is agreeable to [facility] [address] now that she knows her doctor goes to both clinics.   I faxed the pt's packet to [facility] (ph#[phone], fax#[phone]) pending Hep labs, dialysis flow sheet and post dialysis labs, to start the authorization process.....PÉREZ Malcolm       04/13/18 0902   Discharge Reassessment   Assessment Type Discharge Planning Reassessment

## 2018-04-13 NOTE — PROGRESS NOTES
"INPATIENT NEPHROLOGY PROGRESS NOTE  [facility] NEPHROLOGY    Andra Newell  04/13/2018    Reason for consultation:    esrd    Chief Complaint:   Chief Complaint   Patient presents with    Fatigue     reports 2 recent falls, BOYER,  symptoms for 1 week. admitted last week for same thing.         History of Present Illness:       Andra Newell is a 58 y.o. female with a hx of HTN, DM, Gout and Thyroid disease who presents to the ED with complaints of generalized weakness for a few days. Associated sx are CP, SOB, and dysuria. Pt reports sleeping 12 hrs at night and naps throughout the day. She reports 2 falls in the past 2 days due to "my legs giving out on me." Pt denies LOC and head trauma. She reports dysuria before and after voiding associated with decrease in urinary output. She has intermittent bad taste in her mouth.  She is twitchy    4/13  Seen on dialysis.  Mild nausea.  No chest pain or sob        Plan of Care:     Assessment:     esrd  Hypercalcemia--suspect primary hpth due to pth not being appropriately suppressed  Anemia  hypertension    Plan:     1. esrd--see on dialysis    2. Volume/Blood Pressure- judicious uf    3. Electrolytes/Acid Base-compensated.  Calcium better    4. Bone Mineral Metabolism-outpt parathyroid nuclear scan    5. Anemia of CKD-ramone with hd    6. Medications-renal dose    7. Access-functional avf    D/c when outpt hd set up    Thank you for allowing us to participate in this patient's care. We will continue to follow.    Medications:  No current facility-administered medications on file prior to encounter.      Current Outpatient Prescriptions on File Prior to Encounter   Medication Sig Dispense Refill    atorvastatin (LIPITOR) 20 MG tablet Take 20 mg by mouth once daily.      famotidine (PEPCID) 20 MG tablet Take 20 mg by mouth once daily at 6am.      hydrALAZINE (APRESOLINE) 25 MG tablet Take 1 tablet (25 mg total) by mouth every 8 (eight) hours. 90 tablet 0    " insulin aspart (NOVOLOG FLEXPEN) 100 unit/mL InPn pen Up to 20 units 3 times daily with meals; follow physician instructions 1 Box 5    insulin glargine (BASAGLAR KWIKPEN) 100 unit/mL (3 mL) InPn pen Inject 90 Units into the skin 2 (two) times daily. 90 units SQ QAM, 90 units SQ QPM      levothyroxine (SYNTHROID) 75 MCG tablet Take 75 mcg by mouth once daily.      loratadine (CLARITIN) 10 mg tablet Take 10 mg by mouth once daily.      lorazepam (ATIVAN) 1 MG tablet Take 1 mg by mouth 2 (two) times daily.      sodium bicarbonate 650 MG tablet Take 650 mg by mouth once daily.       vilazodone (VIIBRYD) 40 mg Tab tablet Take 40 mg by mouth once daily.       Scheduled Meds:   sodium chloride 0.9%   Intravenous Once    heparin (porcine)  5,000 Units Subcutaneous Q12H    tuberculin  5 Units Intradermal Once     Continuous Infusions:  PRN Meds:.sodium chloride 0.9%, dextrose 50%, dextrose 50%, glucagon (human recombinant), glucose, glucose, heparin (porcine), insulin aspart U-100    Allergies:  Codeine    Vital Signs:  Temp Readings from Last 3 Encounters:   04/13/18 98.3 °F (36.8 °C) (Tympanic)   04/04/18 98.1 °F (36.7 °C)   02/22/18 98.1 °F (36.7 °C) (Oral)       Pulse Readings from Last 3 Encounters:   04/13/18 84   04/04/18 78   02/22/18 70       BP Readings from Last 3 Encounters:   04/13/18 (!) 184/95   04/04/18 (!) 164/75   02/22/18 (!) 151/69       Weight:  Wt Readings from Last 3 Encounters:   04/12/18 105 kg (231 lb 6.4 oz)   04/02/18 106.6 kg (235 lb)   02/22/18 106.6 kg (235 lb)       Review of Systems:  Review of Systems - All 14 systems reviewed and negative, except as noted in HPI    Physical Exam:    Constitutional: NAD  Neuro: No asterixis  Psych: Calm  EENT: NCAT, anicteric sclera   Neck:  supple  Cards: No rub  Lungs: clear to ausculation  GI:  Pos bowel sounds, no hsm, NTND   MSK:  WNWD  Skin: no purpura, rashes  Access: avf    Results:  Lab Results   Component Value Date     04/13/2018     K 3.5 04/13/2018     04/13/2018    CO2 23 04/13/2018    BUN 73 (H) 04/13/2018    CREATININE 3.6 (H) 04/13/2018    CALCIUM 10.1 04/13/2018    ANIONGAP 12 04/13/2018    ESTGFRAFRICA 15 (A) 04/13/2018    EGFRNONAA 13 (A) 04/13/2018       Lab Results   Component Value Date    CALCIUM 10.1 04/13/2018    PHOS 4.8 (H) 04/13/2018         Recent Labs  Lab 04/13/18  0413   WBC 10.70   RBC 3.51*   HGB 9.9*   HCT 29.8*      MCV 85   MCH 28.3   MCHC 33.3       I have personally reviewed pertinent radiological imaging and reports.    Chapincito Tom MD  Nephrology  [facility] [place]  [phone]

## 2018-04-13 NOTE — PROGRESS NOTES
Pharmacist Renal Dose Adjustment Note    Andra Newell is a 58 y.o. female being treated with the medication famotidine.    Patient Data:    Vital Signs (Most Recent):  Temp: 98 °F (36.7 °C) (04/13/18 1115)  Pulse: 80 (04/13/18 1115)  Resp: 18 (04/13/18 1115)  BP: (!) 151/77 (04/13/18 1115)  SpO2: 96 % (04/13/18 0714)   Vital Signs (72h Range):  Temp:  [96.5 °F (35.8 °C)-98.3 °F (36.8 °C)]   Pulse:  []   Resp:  [16-20]   BP: (124-186)/(60-95)   SpO2:  [94 %-100 %]        Recent Labs     Lab 04/12/18  0853 04/13/18  0413   CREATININE 4.0* 3.6*     Serum creatinine: 3.6 mg/dL (H) 04/13/18 0413  Estimated creatinine clearance: 20.7 mL/min (A)    Medication: Famotidine 20 mg BID  will be changed to 20 mg daily.    Андрей Guardado, PharmD

## 2018-04-13 NOTE — PROGRESS NOTES
04/13/18 1115   Handoff Report   Given To Peg   Vital Signs   Temp 98 °F (36.7 °C)   Pulse 80   Resp 18   BP (!) 151/77   Post-Hemodialysis Assessment   Rinseback Volume (mL) 250 mL   Blood Volume Processed (Liters) 37.5 L   Dialyzer Clearance Moderately streaked   Duration of Treatment (minutes) 150 minutes   Hemodialysis Intake (mL) 500 mL   Total UF (mL) 500 mL   Net Fluid Removal 0   Patient Response to Treatment tolerated well    Post-Treatment Weight 103.7 kg (228 lb 9.9 oz)   Treatment Weight Change 0   Arterial bleeding stop time (min) 6 min   Venous bleeding stop time (min) 6 min   Post-Hemodialysis Comments needles pulled without problem, no bleeding

## 2018-04-13 NOTE — PLAN OF CARE
Problem: Fall Risk (Adult)  Goal: Absence of Falls  Patient will demonstrate the desired outcomes by discharge/transition of care.   Outcome: Ongoing (interventions implemented as appropriate)  Patient free from falls or injuries this shift.

## 2018-04-13 NOTE — PLAN OF CARE
Spoke with María Elena at [facility] Intake, faxed over the Hep Panel and dialysis flow sheet from today.   She verified that she received the fax and has spoke with Lidia at [facility] on [address]. The pt is scheduled to start on Monday, 4/16/18 @1:45pm for a 2:15 chair time.   I will place the information on the pt's AVS....PÉREZ Malcolm       04/13/18 8558   Discharge Reassessment   Assessment Type Discharge Planning Reassessment

## 2018-04-13 NOTE — PLAN OF CARE
PCP is Dr Alvarado.  Pharmacy is [facility] on [address].  Lives with spouse.  Independent with ADL's.  Denies HH.  Discharge plan is home; no needs.       04/13/18 6527   Discharge Assessment   Assessment Type Discharge Planning Assessment   Confirmed/corrected address and phone number on facesheet? Yes   Assessment information obtained from? Patient   Prior to hospitilization cognitive status: Alert/Oriented   Prior to hospitalization functional status: Independent   Current cognitive status: Alert/Oriented   Current Functional Status: Independent   Lives With spouse   Able to Return to Prior Arrangements yes   Is patient able to care for self after discharge? Yes   Patient's perception of discharge disposition home or selfcare   Readmission Within The Last 30 Days no previous admission in last 30 days   Patient currently being followed by outpatient case management? No   Patient currently receives any other outside agency services? No   Equipment Currently Used at Home power chair;glucometer;bedside commode;walker, rolling   Do you have any problems affording any of your prescribed medications? No   Is the patient taking medications as prescribed? yes   Does the patient have transportation home? Yes   Transportation Available family or friend will provide   Does the patient receive services at the [facility]mad[facility] Clinic? No   Discharge Plan A Home   Patient/Family In Agreement With Plan yes

## 2018-04-13 NOTE — PLAN OF CARE
Problem: Patient Care Overview  Goal: Plan of Care Review  Outcome: Ongoing (interventions implemented as appropriate)  POC reviewed with patient.  Patient completed dialysis today and will complete again tomorrow then home with outpatient dialysis in place   Patients partner at bedside and active in cares   Patient safety maintained and patient is aware of her physical limitations.  Calls for assistance as needed.  Call light in place at all times

## 2018-04-14 VITALS
DIASTOLIC BLOOD PRESSURE: 65 MMHG | RESPIRATION RATE: 18 BRPM | OXYGEN SATURATION: 100 % | BODY MASS INDEX: 36.31 KG/M2 | WEIGHT: 231.38 LBS | HEART RATE: 71 BPM | TEMPERATURE: 98 F | HEIGHT: 67 IN | SYSTOLIC BLOOD PRESSURE: 142 MMHG

## 2018-04-14 LAB
ANION GAP SERPL CALC-SCNC: 11 MMOL/L
BASOPHILS # BLD AUTO: 0.1 K/UL
BASOPHILS NFR BLD: 0.5 %
BUN SERPL-MCNC: 56 MG/DL
CALCIUM SERPL-MCNC: 9.3 MG/DL
CHLORIDE SERPL-SCNC: 106 MMOL/L
CO2 SERPL-SCNC: 25 MMOL/L
CREAT SERPL-MCNC: 3.4 MG/DL
DIFFERENTIAL METHOD: ABNORMAL
EOSINOPHIL # BLD AUTO: 0.5 K/UL
EOSINOPHIL NFR BLD: 4.3 %
ERYTHROCYTE [DISTWIDTH] IN BLOOD BY AUTOMATED COUNT: 16.5 %
EST. GFR  (AFRICAN AMERICAN): 16 ML/MIN/1.73 M^2
EST. GFR  (NON AFRICAN AMERICAN): 14 ML/MIN/1.73 M^2
GLUCOSE SERPL-MCNC: 143 MG/DL
HCT VFR BLD AUTO: 30.6 %
HGB BLD-MCNC: 10.1 G/DL
LYMPHOCYTES # BLD AUTO: 2.8 K/UL
LYMPHOCYTES NFR BLD: 26.4 %
MAGNESIUM SERPL-MCNC: 2.3 MG/DL
MCH RBC QN AUTO: 28.2 PG
MCHC RBC AUTO-ENTMCNC: 33.1 G/DL
MCV RBC AUTO: 85 FL
MONOCYTES # BLD AUTO: 0.8 K/UL
MONOCYTES NFR BLD: 7.7 %
NEUTROPHILS # BLD AUTO: 6.5 K/UL
NEUTROPHILS NFR BLD: 61.1 %
PHOSPHATE SERPL-MCNC: 3.6 MG/DL
PLATELET # BLD AUTO: 195 K/UL
PMV BLD AUTO: 8.1 FL
POTASSIUM SERPL-SCNC: 3.7 MMOL/L
RBC # BLD AUTO: 3.6 M/UL
SODIUM SERPL-SCNC: 142 MMOL/L
TB INDURATION 48 - 72 HR READ: 0 MM
WBC # BLD AUTO: 10.7 K/UL

## 2018-04-14 PROCEDURE — 96375 TX/PRO/DX INJ NEW DRUG ADDON: CPT | Mod: NTX

## 2018-04-14 PROCEDURE — 63600175 PHARM REV CODE 636 W HCPCS: Mod: NTX | Performed by: INTERNAL MEDICINE

## 2018-04-14 PROCEDURE — 36415 COLL VENOUS BLD VENIPUNCTURE: CPT | Mod: NTX

## 2018-04-14 PROCEDURE — 82962 GLUCOSE BLOOD TEST: CPT | Mod: NTX

## 2018-04-14 PROCEDURE — 94760 N-INVAS EAR/PLS OXIMETRY 1: CPT | Mod: NTX

## 2018-04-14 PROCEDURE — 84100 ASSAY OF PHOSPHORUS: CPT | Mod: NTX

## 2018-04-14 PROCEDURE — 25000003 PHARM REV CODE 250: Mod: NTX | Performed by: INTERNAL MEDICINE

## 2018-04-14 PROCEDURE — 83735 ASSAY OF MAGNESIUM: CPT | Mod: NTX

## 2018-04-14 PROCEDURE — 80100016 HC MAINTENANCE HEMODIALYSIS: Mod: NTX

## 2018-04-14 PROCEDURE — G0378 HOSPITAL OBSERVATION PER HR: HCPCS | Mod: NTX

## 2018-04-14 PROCEDURE — 85025 COMPLETE CBC W/AUTO DIFF WBC: CPT | Mod: NTX

## 2018-04-14 PROCEDURE — 96372 THER/PROPH/DIAG INJ SC/IM: CPT | Mod: NTX,59

## 2018-04-14 PROCEDURE — G0257 UNSCHED DIALYSIS ESRD PT HOS: HCPCS | Mod: NTX

## 2018-04-14 PROCEDURE — 80048 BASIC METABOLIC PNL TOTAL CA: CPT | Mod: NTX

## 2018-04-14 RX ORDER — HYDRALAZINE HYDROCHLORIDE 25 MG/1
25 TABLET, FILM COATED ORAL EVERY 12 HOURS
Status: DISCONTINUED | OUTPATIENT
Start: 2018-04-14 | End: 2018-04-14 | Stop reason: HOSPADM

## 2018-04-14 RX ORDER — ONDANSETRON 2 MG/ML
4 INJECTION INTRAMUSCULAR; INTRAVENOUS EVERY 6 HOURS PRN
Status: DISCONTINUED | OUTPATIENT
Start: 2018-04-14 | End: 2018-04-14 | Stop reason: HOSPADM

## 2018-04-14 RX ORDER — ONDANSETRON 8 MG/1
8 TABLET, ORALLY DISINTEGRATING ORAL EVERY 8 HOURS PRN
Qty: 20 TABLET | Refills: 0 | Status: SHIPPED | OUTPATIENT
Start: 2018-04-14 | End: 2018-07-02

## 2018-04-14 RX ORDER — ONDANSETRON 4 MG/1
8 TABLET, ORALLY DISINTEGRATING ORAL EVERY 8 HOURS PRN
Status: DISCONTINUED | OUTPATIENT
Start: 2018-04-14 | End: 2018-04-14 | Stop reason: HOSPADM

## 2018-04-14 RX ADMIN — VILAZODONE HYDROCHLORIDE 40 MG: 40 TABLET ORAL at 01:04

## 2018-04-14 RX ADMIN — ONDANSETRON 4 MG: 2 INJECTION INTRAMUSCULAR; INTRAVENOUS at 09:04

## 2018-04-14 RX ADMIN — LORAZEPAM 1 MG: 1 TABLET ORAL at 01:04

## 2018-04-14 RX ADMIN — HEPARIN SODIUM 5000 UNITS: 5000 INJECTION, SOLUTION INTRAVENOUS; SUBCUTANEOUS at 01:04

## 2018-04-14 RX ADMIN — ATORVASTATIN CALCIUM 20 MG: 20 TABLET, FILM COATED ORAL at 01:04

## 2018-04-14 RX ADMIN — CETIRIZINE HYDROCHLORIDE 10 MG: 10 TABLET, FILM COATED ORAL at 01:04

## 2018-04-14 RX ADMIN — EPOETIN ALFA 5000 UNITS: 20000 SOLUTION INTRAVENOUS; SUBCUTANEOUS at 11:04

## 2018-04-14 RX ADMIN — LEVOTHYROXINE SODIUM 75 MCG: 25 TABLET ORAL at 06:04

## 2018-04-14 RX ADMIN — HYDRALAZINE HYDROCHLORIDE 25 MG: 25 TABLET, FILM COATED ORAL at 06:04

## 2018-04-14 NOTE — PLAN OF CARE
04/14/18 1156   Final Note   Assessment Type Final Discharge Note   Discharge Disposition Home

## 2018-04-14 NOTE — PROGRESS NOTES
"INPATIENT NEPHROLOGY PROGRESS NOTE  [facility] NEPHROLOGY    Andra Newell  04/14/2018    Reason for consultation:    esrd    Chief Complaint:   Chief Complaint   Patient presents with    Fatigue     reports 2 recent falls, BOYER,  symptoms for 1 week. admitted last week for same thing.         History of Present Illness:       Andra Newell is a 58 y.o. female with a hx of HTN, DM, Gout and Thyroid disease who presents to the ED with complaints of generalized weakness for a few days. Associated sx are CP, SOB, and dysuria. Pt reports sleeping 12 hrs at night and naps throughout the day. She reports 2 falls in the past 2 days due to "my legs giving out on me." Pt denies LOC and head trauma. She reports dysuria before and after voiding associated with decrease in urinary output. She has intermittent bad taste in her mouth.  She is twitchy    4/13  Seen on dialysis.  Mild nausea.  No chest pain or sob  4/14  Seen on dialysis.  Nausea off and on, dizziness w/drop in BP.  Reports mild headache following treatment yesterday.  Ca+9.3. No cp, dyspnea, worsening edema.    Plan of Care:     Assessment:     ESRD  Hypertension  Hypercalcemia--suspect primary hpth due to pth not being appropriately suppressed  Anemia  Acidosis    Plan:     1. ESRD--Seen on dialysis- see BP/VS discussion below.  She has outpatient HD unit placement.     2. Volume/Blood Pressure- We had to turn off UF and give 1L of fluids during HD. Nausea/Hypotension improved with fluid bolus and zofran. HD nurse then tried to UF off what she gave to run patient back to even but at end of treatment, SBP dropped back to 70s and patient became nauseated again. She was given something to drink and SBP came up to low 100s and nausea was better. Patient should NOT get BP medication before dialysis. Change hydralazine to 25mg BID- hold tonight if SBP < 120.     3. Bone Mineral Metabolism- Will plan for outpt parathyroid nuclear scan.    4. Anemia of CKD- " Stable- continue EPO with HD.     5. Acidosis- Resolved- d/c oral bicarb.     Update: Patient seen after HD- still feels lousy, last SBP low 100s- ordered NS bolus 500cc over 2 hours. She is not stable for discharge at this time.    Thank you for allowing us to participate in this patient's care. We will continue to follow.    Medications:  No current facility-administered medications on file prior to encounter.      Current Outpatient Prescriptions on File Prior to Encounter   Medication Sig Dispense Refill    atorvastatin (LIPITOR) 20 MG tablet Take 20 mg by mouth once daily.      famotidine (PEPCID) 20 MG tablet Take 20 mg by mouth once daily at 6am.      hydrALAZINE (APRESOLINE) 25 MG tablet Take 1 tablet (25 mg total) by mouth every 8 (eight) hours. 90 tablet 0    insulin aspart (NOVOLOG FLEXPEN) 100 unit/mL InPn pen Up to 20 units 3 times daily with meals; follow physician instructions 1 Box 5    insulin glargine (BASAGLAR KWIKPEN) 100 unit/mL (3 mL) InPn pen Inject 90 Units into the skin 2 (two) times daily. 90 units SQ QAM, 90 units SQ QPM      levothyroxine (SYNTHROID) 75 MCG tablet Take 75 mcg by mouth once daily.      loratadine (CLARITIN) 10 mg tablet Take 10 mg by mouth once daily.      lorazepam (ATIVAN) 1 MG tablet Take 1 mg by mouth 2 (two) times daily.      sodium bicarbonate 650 MG tablet Take 650 mg by mouth once daily.       vilazodone (VIIBRYD) 40 mg Tab tablet Take 40 mg by mouth once daily.       Scheduled Meds:   sodium chloride 0.9%   Intravenous Once    sodium chloride 0.9%   Intravenous Once    atorvastatin  20 mg Oral Daily    cetirizine  10 mg Oral Daily    epoetin carol (PROCRIT) injection  5,000 Units Intravenous Once    famotidine  20 mg Oral QHS    heparin (porcine)  5,000 Units Subcutaneous Q12H    hydrALAZINE  25 mg Oral Q8H    insulin detemir U-100  90 Units Subcutaneous QHS    levothyroxine  75 mcg Oral Before breakfast    LORazepam  1 mg Oral BID    sodium  bicarbonate  650 mg Oral Daily    vilazodone  40 mg Oral Daily     Continuous Infusions:  PRN Meds:.sodium chloride 0.9%, sodium chloride 0.9%, dextrose 50%, dextrose 50%, glucagon (human recombinant), glucose, glucose, heparin (porcine), heparin (porcine), insulin aspart U-100, ondansetron, ondansetron    Allergies:  Codeine    Vital Signs:  Temp Readings from Last 3 Encounters:   04/14/18 97.4 °F (36.3 °C)   04/04/18 98.1 °F (36.7 °C)   02/22/18 98.1 °F (36.7 °C) (Oral)       Pulse Readings from Last 3 Encounters:   04/14/18 75   04/04/18 78   02/22/18 70       BP Readings from Last 3 Encounters:   04/14/18 (!) 144/65   04/04/18 (!) 164/75   02/22/18 (!) 151/69       Weight:  Wt Readings from Last 3 Encounters:   04/12/18 105 kg (231 lb 6.4 oz)   04/02/18 106.6 kg (235 lb)   02/22/18 106.6 kg (235 lb)       Review of Systems:  Review of Systems - All 14 systems reviewed and negative, except as noted in HPI    Physical Exam:    Constitutional: NAD  Neuro: No asterixis  Psych: Calm  EENT: NCAT, anicteric sclera   Neck:  supple  Cards: No rub  Lungs: clear to ausculation  GI:  Pos bowel sounds, no hsm, NTND   MSK:  WNWD  Skin: no purpura, rashes  Access: AVF, + thrill/bruit    Results:  Lab Results   Component Value Date     04/14/2018    K 3.7 04/14/2018     04/14/2018    CO2 25 04/14/2018    BUN 56 (H) 04/14/2018    CREATININE 3.4 (H) 04/14/2018    CALCIUM 9.3 04/14/2018    ANIONGAP 11 04/14/2018    ESTGFRAFRICA 16 (A) 04/14/2018    EGFRNONAA 14 (A) 04/14/2018       Lab Results   Component Value Date    CALCIUM 9.3 04/14/2018    PHOS 3.6 04/14/2018         Recent Labs  Lab 04/14/18  0342   WBC 10.70   RBC 3.60*   HGB 10.1*   HCT 30.6*      MCV 85   MCH 28.2   MCHC 33.1       I have personally reviewed pertinent radiological imaging and reports.    Dolores Rodney MD MPH  [facility] [place]  [phone]

## 2018-04-14 NOTE — NURSING
Call placed to dialysis nurse Dilcia in regards to pt's heart rate in 40s as reported by guzman Berg.

## 2018-04-14 NOTE — HOSPITAL COURSE
Patient monitored during her observation stay. Nephrology consulted for ESRD management. She was initiated on HD. Regarding hypercalcemia, she received gentle IV hydration and calcium analogs held. Calcium monitored and improved. While on HD, she was noted to have episode of weakness and hypotension requiring fluid replacement. She is feeling better after lunch and tolerated diet.   She is stable for DC from nephrology standpoint.

## 2018-04-14 NOTE — HPI
"Patient is a 58 y.o. female admitted to Hospitalist Service from [facility] Emergency Room with complaint of worsening fatigue, frequent falls and SOB. Patient reportedly has past medical history significant for HTN, DM2, Hypothyroidism, Depression, Gout, Interstitial Nephritis, and ESRD awaiting HD and renal transplant. She had a dialysis graft placed 6 months ago, but has not started dialysis. Dr. Heaton is her nephrologist. Patient was recently hospitalized for similar complaints (04-04-18 to 04-04-18). Patient presented with complaints of generalized weakness for a few days. Associated sx are CP, SOB, and dysuria. Pt reports sleeping 12 hrs at night and naps throughout the day. She reports 2 falls in the past 2 days due to "my legs giving out on me." Pt denies LOC and head trauma. She reports dysuria before and after voiding associated with decrease in urinary output. Pt notes a UTI in the past. Patient denied abdominal pain, nausea, vomiting, headache, vision changes, focal neuro-deficits, cough or fever.  "

## 2018-04-14 NOTE — PLAN OF CARE
Problem: Diabetes, Type 2 (Adult)  Intervention: Support/Optimize Psychosocial Response to Condition   04/13/18 0714   Coping/Psychosocial Interventions   Supportive Measures active listening utilized;goal setting facilitated;positive reinforcement provided;self-care encouraged   Environmental Support calm environment promoted;rest periods encouraged;personal routine supported;environmental consistency promoted     Intervention: Optimize Glycemic Control   04/14/18 0459   Nutrition Interventions   Glycemic Management blood glucose monitoring;insulin dose matched to carbohydrate intake;supplemental insulin given       Goal: Signs and Symptoms of Listed Potential Problems Will be Absent, Minimized or Managed (Diabetes, Type 2)  Signs and symptoms of listed potential problems will be absent, minimized or managed by discharge/transition of care (reference Diabetes, Type 2 (Adult) CPG).   Outcome: Ongoing (interventions implemented as appropriate)   04/14/18 0459   Diabetes, Type 2   Problems Assessed (Type 2 Diabetes) all   Problems Present (Type 2 Diabetes) hyperglycemia       Problem: Patient Care Overview  Goal: Plan of Care Review  Outcome: Ongoing (interventions implemented as appropriate)   04/14/18 0459   Coping/Psychosocial   Plan Of Care Reviewed With patient     Goal: Individualization & Mutuality  Outcome: Ongoing (interventions implemented as appropriate)   04/12/18 1626   Mutuality/Individual Preferences   What Anxieties, Fears, Concerns, or Questions Do You Have About Your Care? none   What Information Would Help Us Give You More Personalized Care? none

## 2018-04-14 NOTE — DISCHARGE SUMMARY
"[facility]  Hospital Medicine  Discharge Summary      Patient Name: Andra Newell  MRN: [id number]  Admission Date: 4/12/2018  Hospital Length of Stay: 0 days  Discharge Date and Time: 4/14/2018  3:05 PM  Attending Physician: No att. providers found   Discharging Provider: Pia Up NP  Primary Care Provider: Diony Alvarado MD      HPI:   Patient is a 58 y.o. female admitted to Hospitalist Service from [facility] Room with complaint of worsening fatigue, frequent falls and SOB. Patient reportedly has past medical history significant for HTN, DM2, Hypothyroidism, Depression, Gout, Interstitial Nephritis, and ESRD awaiting HD and renal transplant. She had a dialysis graft placed 6 months ago, but has not started dialysis. Dr. Heaton is her nephrologist. Patient was recently hospitalized for similar complaints (04-04-18 to 04-04-18). Patient presented with complaints of generalized weakness for a few days. Associated sx are CP, SOB, and dysuria. Pt reports sleeping 12 hrs at night and naps throughout the day. She reports 2 falls in the past 2 days due to "my legs giving out on me." Pt denies LOC and head trauma. She reports dysuria before and after voiding associated with decrease in urinary output. Pt notes a UTI in the past. Patient denied abdominal pain, nausea, vomiting, headache, vision changes, focal neuro-deficits, cough or fever.    * No surgery found *      Hospital Course:   Patient monitored during her observation stay. Nephrology consulted for ESRD management. She was initiated on HD. Regarding hypercalcemia, she received gentle IV hydration and calcium analogs held. Calcium monitored and improved. While on HD, she was noted to have episode of weakness and hypotension requiring fluid replacement. She is feeling better after lunch and tolerated diet.   She is stable for DC from nephrology standpoint.          Consults:   Consults         Status " Ordering Provider     Inpatient consult to Nephrology  Once     Provider:  Chapincito Mcmullen MD    Completed ALEC GOODE     Inpatient consult to Social Work/Case Management  Once     Provider:  (Not yet assigned)    Completed CHAPINCITO MCMULLEN          No new Assessment & Plan notes have been filed under this hospital service since the last note was generated.  Service: Hospital Medicine    Final Active Diagnoses:    Diagnosis Date Noted POA    PRINCIPAL PROBLEM:  Hypercalcemia [E83.52] 04/02/2018 Yes      Problems Resolved During this Admission:    Diagnosis Date Noted Date Resolved POA       Discharged Condition: stable    Disposition: Home or Self Care    Follow Up:  Follow-up Information     [facility] SHAILA KIDNEY [facility] On 4/16/2018.    Why:  @1:45pm for a 2:15pm chair time; MWF @2:15pm chair time   Contact information:  [facility] [place] [address]  [phone]           Diony Alvarado MD In 1 week.    Specialty:  Internal Medicine  Why:  hospital follow up  Contact information:  [address] [place] [address] [address] [place] [address]  [phone]                 Patient Instructions:     Activity as tolerated     Notify your health care provider if you experience any of the following:  redness, tenderness, or signs of infection (pain, swelling, redness, odor or green/yellow discharge around incision site)     Notify your health care provider if you experience any of the following:  persistent nausea and vomiting or diarrhea     Notify your health care provider if you experience any of the following:  severe uncontrolled pain     Notify your health care provider if you experience any of the following:  persistent dizziness, light-headedness, or visual disturbances         Significant Diagnostic Studies: Labs:   BMP:   Recent Labs  Lab 04/13/18  0413 04/14/18  0342   * 143*    142   K 3.5 3.7    106   CO2 23 25   BUN 73* 56*   CREATININE 3.6* 3.4*   CALCIUM 10.1 9.3   MG 2.2 2.3     and CMP   Recent Labs  Lab 04/13/18 0413 04/14/18  0342    142   K 3.5 3.7    106   CO2 23 25   * 143*   BUN 73* 56*   CREATININE 3.6* 3.4*   CALCIUM 10.1 9.3   ALBUMIN 3.2*  --    ANIONGAP 12 11   ESTGFRAFRICA 15* 16*   EGFRNONAA 13* 14*       Pending Diagnostic Studies:     Procedure Component Value Units Date/Time    Calcitriol [[id number]] Collected:  04/13/18 0413    Order Status:  Sent Lab Status:  In process Updated:  04/13/18 0449    Specimen:  Blood from Blood          Medications:  Reconciled Home Medications:      Medication List      START taking these medications    ondansetron 8 MG Tbdl  Commonly known as:  ZOFRAN-ODT  Take 1 tablet (8 mg total) by mouth every 8 (eight) hours as needed.        CONTINUE taking these medications    ATIVAN 1 MG tablet  Generic drug:  LORazepam  Take 1 mg by mouth 2 (two) times daily.     atorvastatin 20 MG tablet  Commonly known as:  LIPITOR  Take 20 mg by mouth once daily.     BASAGLAR KWIKPEN U-100 INSULIN 100 unit/mL (3 mL) Inpn pen  Generic drug:  insulin glargine  Inject 90 Units into the skin 2 (two) times daily. 90 units SQ QAM, 90 units SQ QPM     famotidine 20 MG tablet  Commonly known as:  PEPCID  Take 20 mg by mouth once daily at 6am.     hydrALAZINE 25 MG tablet  Commonly known as:  APRESOLINE  Take 1 tablet (25 mg total) by mouth every 8 (eight) hours.     insulin aspart U-100 100 unit/mL Inpn pen  Commonly known as:  NovoLOG Flexpen U-100 Insulin  Up to 20 units 3 times daily with meals; follow physician instructions     levothyroxine 75 MCG tablet  Commonly known as:  SYNTHROID  Take 75 mcg by mouth once daily.     loratadine 10 mg tablet  Commonly known as:  CLARITIN  Take 10 mg by mouth once daily.     sodium bicarbonate 650 MG tablet  Take 650 mg by mouth once daily.     vilazodone 40 mg Tab tablet  Commonly known as:  VIIBRYD  Take 40 mg by mouth once daily.            Indwelling Lines/Drains at time of discharge:    Lines/Drains/Airways     Drain                 Hemodialysis AV Fistula Left upper arm -- days          Epidural Line                 Perineural Analgesia/Anesthesia Assessment (using dermatomes) Epidural 11/24/15 0750 872 days                Time spent on the discharge of patient: 32 minutes  Patient was seen and examined on the date of discharge and determined to be suitable for discharge.         Pia Up NP  Department of Hospital Medicine  [facility]

## 2018-04-14 NOTE — NURSING
"Pt's medication and discharge instructions given and reviewed, understanding verbalized.  PIV and telemetry monitor removed.  She stated, "I feel better."  Vitals stable.  Discharged home with her partner.  "

## 2018-04-14 NOTE — PROGRESS NOTES
Total UF 0; kept even. Pt was given 1 liter NS bolus during hd tx for drop in bp. Pt stable and was able to UF fluid given to make even for tx. After needles pulled pt started to feel dizzy again. Reclined pt and gave sips of water. Pt felt better and vital signs returned within normal range. Called report to Amanuel and informed her of episode and informed Dr. Rodney. Pt ambulated to wheelchair without difficulty.

## 2018-04-16 LAB — 1,25(OH)2D3 SERPL-MCNC: 6 PG/ML

## 2018-05-02 PROCEDURE — 86832 HLA CLASS I HIGH DEFIN QUAL: CPT | Mod: PO,TXP

## 2018-05-02 PROCEDURE — 86833 HLA CLASS II HIGH DEFIN QUAL: CPT | Mod: PO,TXP

## 2018-05-03 ENCOUNTER — LAB VISIT (OUTPATIENT)
Dept: LAB | Facility: HOSPITAL | Age: 58
End: 2018-05-03
Payer: COMMERCIAL

## 2018-05-03 DIAGNOSIS — Z01.818 PRE-OP EXAMINATION: ICD-10-CM

## 2018-05-07 ENCOUNTER — LAB VISIT (OUTPATIENT)
Dept: LAB | Facility: HOSPITAL | Age: 58
End: 2018-05-07
Attending: NURSE PRACTITIONER
Payer: MEDICARE

## 2018-05-07 DIAGNOSIS — Z76.82 AWAITING ORGAN TRANSPLANT STATUS: ICD-10-CM

## 2018-05-07 PROCEDURE — 86832 HLA CLASS I HIGH DEFIN QUAL: CPT | Mod: PO,TXP

## 2018-05-07 PROCEDURE — 86833 HLA CLASS II HIGH DEFIN QUAL: CPT | Mod: PO,TXP

## 2018-05-16 LAB
LEFT EYE DM RETINOPATHY: POSITIVE
RIGHT EYE DM RETINOPATHY: POSITIVE

## 2018-05-19 LAB — HPRA INTERPRETATION: NORMAL

## 2018-05-21 LAB — HPRA INTERPRETATION: NORMAL

## 2018-05-22 ENCOUNTER — TELEPHONE (OUTPATIENT)
Dept: TRANSPLANT | Facility: CLINIC | Age: 58
End: 2018-05-22

## 2018-05-22 NOTE — TELEPHONE ENCOUNTER
Labs received, will scan into media for review.    Spoke with patient, no recent issues, infections, or med changes to report. Patient has started HD on Monday and Friday only at [facility] in [place] on [address]. Will request labs and 27/28

## 2018-05-24 LAB
CLASS I ANTIBODIES - LUMINEX: NORMAL
CLASS I ANTIBODY COMMENTS - LUMINEX: NORMAL
CLASS II ANTIBODIES - LUMINEX: NORMAL
CLASS II ANTIBODY COMMENTS - LUMINEX: NORMAL
CPRA %: 75
SERUM COLLECTION DT - LUMINEX CLASS I: NORMAL
SERUM COLLECTION DT - LUMINEX CLASS II: NORMAL
SPCL1 TESTING DATE: NORMAL
SPCL2 TESTING DATE: NORMAL
SPCLU TESTING DATE: NORMAL

## 2018-05-30 ENCOUNTER — TELEPHONE (OUTPATIENT)
Dept: TRANSPLANT | Facility: HOSPITAL | Age: 58
End: 2018-05-30

## 2018-05-30 DIAGNOSIS — Z76.82 ORGAN TRANSPLANT CANDIDATE: Primary | ICD-10-CM

## 2018-05-30 NOTE — TELEPHONE ENCOUNTER
Renal Transplant Attending Recipient Chart Review Note:    58 y.o. female with history of ESRD secondary to DM/HTN who has been on HD since 4/13/18; last seen in initial RR clinic on 7/12/17. She was approved in selection on 10/20/17. We reviewed this patient on 5/22/18.    She was seen in the ED with SOB, cramps, and headache on 10/21/17.    She was seen in the ED on 2/22/18 with epigastric/lower chest pain which resolved with GI cocktail and UTI.     She was admitted from 4/2/18 to 4/4/18 with  Intermittent mid-sternal chest pain. TTE during this admission on 4/3/18 with LVEF 55-60%. She had negative DSE on 7/26/17.     She was admitted again from 4/12/18 to 4/14/18 with worsened fstigue, frequent falls?, and SOB and it was during this hospitalization when she was started on HD. There are care management notes that state she is using power chair/scooter (one example is note by Monica Walter dated 4/13/18). Will need to bring patient into clinic for functional status assessment.     Labs:   4/16/18 4/23/18 5/14/18   WBC 2.7 9.7    Hb  9.9 9.4   Platelet count  256    Potassium  4.2    Albumin   3.8    PTH   188       Anahy Weiss MD  Renal Transplant Attending      ----- Message from Conrad Shea RN sent at 5/30/2018  1:36 PM CDT -----  Per review on 5/22/18:    58 yr old female with ESRD due to DM/HTN, started HD on 4/13/18.    1.  Seen in Initial RR in 7/2017 due 7/2018 (approved 10/2017)  2.  Multiple admissions with CP and SOB- last was day after approval  3.  DSE 7/2017 was negative  4.  Clarify functional status- Is she in a power chair??  5.   and Alb 3.8

## 2018-06-01 LAB
CLASS I ANTIBODIES - LUMINEX: NORMAL
CLASS I ANTIBODY COMMENTS - LUMINEX: NORMAL
CLASS II ANTIBODIES - LUMINEX: NORMAL
CLASS II ANTIBODY COMMENTS - LUMINEX: NORMAL
CPRA %: 43
CPRA %: 78
CPRA %: 87
SERUM COLLECTION DT - LUMINEX CLASS I: NORMAL
SERUM COLLECTION DT - LUMINEX CLASS II: NORMAL
SPCL1 TESTING DATE: NORMAL
SPCL2 TESTING DATE: NORMAL
SPCLU TESTING DATE: NORMAL

## 2018-06-04 ENCOUNTER — LAB VISIT (OUTPATIENT)
Dept: LAB | Facility: HOSPITAL | Age: 58
End: 2018-06-04
Attending: NURSE PRACTITIONER
Payer: MEDICARE

## 2018-06-04 DIAGNOSIS — Z76.82 AWAITING ORGAN TRANSPLANT STATUS: ICD-10-CM

## 2018-06-04 PROCEDURE — 86977 RBC SERUM PRETX INCUBJ/INHIB: CPT | Mod: PO,TXP

## 2018-06-04 PROCEDURE — 86832 HLA CLASS I HIGH DEFIN QUAL: CPT | Mod: PO,TXP

## 2018-06-04 PROCEDURE — 86833 HLA CLASS II HIGH DEFIN QUAL: CPT | Mod: PO,TXP

## 2018-06-07 LAB
CLASS I ANTIBODIES - LUMINEX: NORMAL
CLASS I ANTIBODY COMMENTS - LUMINEX: NORMAL
CLASS II ANTIBODIES - LUMINEX: NORMAL
CLASS II ANTIBODY COMMENTS - LUMINEX: NORMAL
CPRA %: 43
CPRA %: 77
CPRA %: 86
SERUM COLLECTION DT - LUMINEX CLASS I: NORMAL
SERUM COLLECTION DT - LUMINEX CLASS II: NORMAL
SPCL1 TESTING DATE: NORMAL
SPCL2 TESTING DATE: NORMAL
SPLUA TESTING DATE: NORMAL

## 2018-06-16 LAB — HPRA INTERPRETATION: NORMAL

## 2018-07-02 ENCOUNTER — LAB VISIT (OUTPATIENT)
Dept: LAB | Facility: HOSPITAL | Age: 58
End: 2018-07-02
Attending: NURSE PRACTITIONER
Payer: MEDICARE

## 2018-07-02 ENCOUNTER — OFFICE VISIT (OUTPATIENT)
Dept: INTERNAL MEDICINE | Facility: CLINIC | Age: 58
End: 2018-07-02
Payer: MEDICARE

## 2018-07-02 VITALS
DIASTOLIC BLOOD PRESSURE: 82 MMHG | SYSTOLIC BLOOD PRESSURE: 154 MMHG | WEIGHT: 228 LBS | HEART RATE: 80 BPM | TEMPERATURE: 99 F | BODY MASS INDEX: 35.79 KG/M2 | RESPIRATION RATE: 16 BRPM | HEIGHT: 67 IN | OXYGEN SATURATION: 97 %

## 2018-07-02 DIAGNOSIS — K21.9 GASTROESOPHAGEAL REFLUX DISEASE WITHOUT ESOPHAGITIS: ICD-10-CM

## 2018-07-02 DIAGNOSIS — Z79.4 UNCONTROLLED TYPE 2 DIABETES MELLITUS WITH HYPERGLYCEMIA, WITH LONG-TERM CURRENT USE OF INSULIN: Primary | ICD-10-CM

## 2018-07-02 DIAGNOSIS — F32.9 REACTIVE DEPRESSION: ICD-10-CM

## 2018-07-02 DIAGNOSIS — E03.9 ACQUIRED HYPOTHYROIDISM: ICD-10-CM

## 2018-07-02 DIAGNOSIS — Z76.82 AWAITING ORGAN TRANSPLANT STATUS: ICD-10-CM

## 2018-07-02 DIAGNOSIS — I12.0 HYPERTENSION ASSOCIATED WITH STAGE 5 CHRONIC KIDNEY DISEASE DUE TO TYPE 2 DIABETES MELLITUS: ICD-10-CM

## 2018-07-02 DIAGNOSIS — Z91.09 ENVIRONMENTAL ALLERGIES: ICD-10-CM

## 2018-07-02 DIAGNOSIS — M89.9 BONE DISEASE: ICD-10-CM

## 2018-07-02 DIAGNOSIS — E11.22 HYPERTENSION ASSOCIATED WITH STAGE 5 CHRONIC KIDNEY DISEASE DUE TO TYPE 2 DIABETES MELLITUS: ICD-10-CM

## 2018-07-02 DIAGNOSIS — R30.0 DYSURIA: ICD-10-CM

## 2018-07-02 DIAGNOSIS — N18.5 HYPERTENSION ASSOCIATED WITH STAGE 5 CHRONIC KIDNEY DISEASE DUE TO TYPE 2 DIABETES MELLITUS: ICD-10-CM

## 2018-07-02 DIAGNOSIS — E11.65 UNCONTROLLED TYPE 2 DIABETES MELLITUS WITH HYPERGLYCEMIA, WITH LONG-TERM CURRENT USE OF INSULIN: Primary | ICD-10-CM

## 2018-07-02 PROCEDURE — 99001 SPECIMEN HANDLING PT-LAB: CPT | Mod: PO,TXP

## 2018-07-02 PROCEDURE — 99214 OFFICE O/P EST MOD 30 MIN: CPT | Mod: ,,, | Performed by: INTERNAL MEDICINE

## 2018-07-02 RX ORDER — LORAZEPAM 1 MG/1
1 TABLET ORAL 2 TIMES DAILY
Qty: 60 TABLET | Refills: 2 | Status: SHIPPED | OUTPATIENT
Start: 2018-07-02 | End: 2018-10-09 | Stop reason: ALTCHOICE

## 2018-07-02 RX ORDER — LISINOPRIL 10 MG/1
10 TABLET ORAL DAILY
Qty: 90 TABLET | Refills: 1 | Status: SHIPPED | OUTPATIENT
Start: 2018-07-02 | End: 2018-07-30 | Stop reason: SDUPTHER

## 2018-07-02 RX ORDER — HYDRALAZINE HYDROCHLORIDE 25 MG/1
25 TABLET, FILM COATED ORAL EVERY 8 HOURS
Qty: 90 TABLET | Refills: 0 | Status: SHIPPED | OUTPATIENT
Start: 2018-07-02 | End: 2018-07-12 | Stop reason: SDUPTHER

## 2018-07-02 RX ORDER — FAMOTIDINE 20 MG/1
20 TABLET, FILM COATED ORAL NIGHTLY PRN
Qty: 30 TABLET | Refills: 5 | Status: SHIPPED | OUTPATIENT
Start: 2018-07-02 | End: 2019-01-01 | Stop reason: SDUPTHER

## 2018-07-02 RX ORDER — INSULIN GLARGINE 100 [IU]/ML
90 INJECTION, SOLUTION SUBCUTANEOUS 2 TIMES DAILY
Qty: 3 BOX | Refills: 5 | Status: SHIPPED | OUTPATIENT
Start: 2018-07-02 | End: 2018-10-09 | Stop reason: ALTCHOICE

## 2018-07-02 RX ORDER — LEVOTHYROXINE SODIUM 75 UG/1
75 TABLET ORAL DAILY
Qty: 30 TABLET | Refills: 5 | Status: SHIPPED | OUTPATIENT
Start: 2018-07-02 | End: 2019-01-01 | Stop reason: SDUPTHER

## 2018-07-02 RX ORDER — VILAZODONE HYDROCHLORIDE 40 MG/1
40 TABLET ORAL DAILY
Qty: 30 TABLET | Refills: 5 | Status: SHIPPED | OUTPATIENT
Start: 2018-07-02 | End: 2019-01-01 | Stop reason: SDUPTHER

## 2018-07-02 RX ORDER — ATORVASTATIN CALCIUM 20 MG/1
20 TABLET, FILM COATED ORAL DAILY
Qty: 30 TABLET | Refills: 5 | Status: SHIPPED | OUTPATIENT
Start: 2018-07-02 | End: 2018-07-03 | Stop reason: SDUPTHER

## 2018-07-02 RX ORDER — LORATADINE 10 MG/1
10 TABLET ORAL DAILY
COMMUNITY
Start: 2018-07-02 | End: 2018-09-12 | Stop reason: SDUPTHER

## 2018-07-02 NOTE — PATIENT INSTRUCTIONS
Understanding Carbohydrates, Fats, and Protein  Food is a source of fuel and nourishment for your body. Its also a source of pleasure. Having diabetes doesnt mean you have to eat special foods or give up desserts. Instead, your dietitian can show you how to plan meals to suit your body. To start, learn how different foods affect blood sugar.  Carbohydrates  Carbohydrates are the main source of fuel for the body. Carbohydrates raise blood sugar. Many people think carbohydrates are only found in pasta or bread. But carbohydrates are actually in many kinds of foods:  · Sugars occur naturally in foods such as fruit, milk, honey, and molasses. Sugars can also be added to many foods, from cereals and yogurt to candy and desserts. Sugars raise blood sugar.  · Starches are found in bread, cereals, pasta, and dried beans. Theyre also found in corn, peas, potatoes, yam, acorn squash, and butternut squash. Starches also raise blood sugar.   · Fiber is found in foods such as vegetables, fruits, beans, and whole grains. Unlike other carbs, fiber isnt digested or absorbed. So it doesnt raise blood sugar. In fact, fiber can help keep blood sugar from rising too fast. It also helps keep blood cholesterol at a healthy level.  Did you know?  Even though carbohydrates raise blood sugar, its best to have some in every meal. They are an important part of a healthy diet.   Fat  Fat is an energy source that can be stored until needed. Fat does not raise blood sugar. However, it can raise blood cholesterol, increasing the risk of heart disease. Fat is also high in calories, which can cause weight gain. Not all types of fat are the same.  More Healthy:  · Monounsaturated fats are mostly found in vegetable oils, such as olive, canola, and peanut oils. They are also found in avocados and some nuts. Monounsaturated fats are healthy for your heart. Thats because they lower LDL (unhealthy) cholesterol.  · Polyunsaturated fats are mostly  found in vegetable oils, such as corn, safflower, and soybean oils. They are also found in some seeds, nuts, and fish. Polyunsaturated fats lower LDL (unhealthy) cholesterol. So, choosing them instead of saturated fats is healthy for your heart. Certain unsaturated fats can help lower triglycerides.   Less Healthy:  · Saturated fats are found in animal products, such as meat, poultry, whole milk, lard, and butter. Saturated fats raise LDL cholesterol and are not healthy for your heart.  · Hydrogenated oils and trans fats are formed when vegetable oils are processed into solid fats. They are found in many processed foods. Hydrogenated oils and trans fats raise LDL cholesterol and lower HDL (healthy) cholesterol. They are not healthy for your heart.  Protein  Protein helps the body build and repair muscle and other tissue. Protein has little or no effect on blood sugar. However, many foods that contain protein also contain saturated fat. By choosing low-fat protein sources, you can get the benefits of protein without the extra fat:  · Plant protein is found in dry beans and peas, nuts, and soy products, such as tofu and soymilk. These sources tend to be cholesterol-free and low in saturated fat.  · Animal protein is found in fish, poultry, meat, cheese, milk, and eggs. These contain cholesterol and can be high in saturated fat. Aim for lean, lower-fat choices.  Date Last Reviewed: 3/1/2016  © 2349-5497 [employer]. [address] [place] [id number]. All rights reserved. This information is not intended as a substitute for professional medical care. Always follow your healthcare professional's instructions.      The patient is asked to make an attempt to improve diet and exercise patterns to aid in medical management of this problem.

## 2018-07-02 NOTE — PROGRESS NOTES
SUBJECTIVE:    Patient ID: Andra Newell is a 58 y.o. female.    Chief Complaint: Diabetes and Follow-up    HPI     Diabetes with end stage renal disease on dialysis twice weekly and extremely hypertensive prior to dialysis-shes high now and it stays high--Diabetes now with A1C 7.1-paying attention to diabetic diet-no prob with vision-needs glasses-minor gout attack for which she took nothing due to dialysis-    Back pain-and knee and hip pain all the time-had a rotator cuff repair and sev weeks later re-tore the cuff (left)    Depression -seems ok with rx-still has some anger issues at times-sees psych every 3 months and therapist twice weekly    BP-up today and recently-asx    Past Medical History:   Diagnosis Date    Anemia     Arthritis     Asthma     allergic airway    Depression     Diabetes mellitus     Eosinophilia     Gout     Gout     Hyperlipidemia     Hypertension     Low back pain     MRSA carrier     Obesity     Renal disorder     Rotator cuff syndrome--left     Thyroid disease     Ulnar neuropathy of right upper extremity      Social History     Social History    Marital status: Significant Other     Spouse name: N/A    Number of children: N/A    Years of education: N/A     Occupational History    Not on file.     Social History Main Topics    Smoking status: Never Smoker    Smokeless tobacco: Never Used    Alcohol use No    Drug use: No    Sexual activity: No     Other Topics Concern    Not on file     Social History Narrative    No narrative on file     Past Surgical History:   Procedure Laterality Date    ACHILLES TENDON SURGERY Left May 2015    BACK SURGERY      CHOLECYSTECTOMY      COLONOSCOPY N/A 9/6/2017    Procedure: COLONOSCOPY;  Surgeon: Marisol Bryson MD;  Location: [facility];  Service: Endoscopy;  Laterality: N/A;    DIALYSIS FISTULA CREATION      HEMORRHOID SURGERY      JOINT REPLACEMENT      left    KNEE SURGERY      SHOULDER ARTHROSCOPY   "    SHOULDER SURGERY       Family History   Problem Relation Age of Onset    Diabetes Mother     Alzheimer's disease Mother     Thyroid disease Mother     Hyperlipidemia Mother     Heart disease Father     Stroke Father     Diabetes Sister     Lupus Sister     Ovarian cancer Sister     Heart disease Brother      Vitals:    07/02/18 1117 07/02/18 1207   BP: (!) 160/72 (!) 154/82   Pulse: 80    Resp: 16    Temp: 98.6 °F (37 °C)    SpO2: 97%    Weight: 103.4 kg (228 lb)    Height: 5' 7" (1.702 m)        Review of Systems   Constitutional: Negative for appetite change, chills, diaphoresis, fatigue, fever and unexpected weight change.        11 pound weight loss on diet-decreased appetitie   HENT: Negative for congestion, ear pain, hearing loss, nosebleeds, postnasal drip, sinus pain, sinus pressure, sneezing, sore throat, tinnitus, trouble swallowing and voice change.    Eyes: Negative for photophobia, pain, itching and visual disturbance.        HPI   Respiratory: Positive for cough (dry-? allergy related). Negative for apnea, chest tightness, shortness of breath, wheezing and stridor.    Cardiovascular: Negative for chest pain, palpitations and leg swelling.        Admitted with chest pain 8 months ago-neg cardiac work-up   Gastrointestinal: Negative for abdominal distention, abdominal pain, blood in stool, constipation, diarrhea (runny stools at times), nausea and vomiting.   Endocrine: Negative for cold intolerance, heat intolerance, polydipsia and polyuria.   Genitourinary: Negative for difficulty urinating, dyspareunia, dysuria (slight burning), flank pain, frequency, hematuria, menstrual problem, pelvic pain, urgency, vaginal discharge and vaginal pain.   Musculoskeletal: Negative for arthralgias, back pain, joint swelling, myalgias, neck pain and neck stiffness.        HPI   Skin: Negative for pallor.   Allergic/Immunologic: Negative for environmental allergies and food allergies.   Neurological: " Negative for dizziness, tremors, speech difficulty, weakness, light-headedness and numbness (toes and baby finger on right intermittently).   Hematological: Does not bruise/bleed easily.   Psychiatric/Behavioral: Negative for agitation, confusion, decreased concentration, sleep disturbance and suicidal ideas. The patient is not nervous/anxious.         HPI          Objective:      Physical Exam   Constitutional: She is oriented to person, place, and time. She appears well-developed and well-nourished. She is cooperative. No distress.   overweight   HENT:   Head: Normocephalic and atraumatic.   Right Ear: Tympanic membrane normal.   Left Ear: Tympanic membrane normal.   Mouth/Throat: Uvula is midline and mucous membranes are normal.   Eyes: Conjunctivae, EOM and lids are normal. Pupils are equal, round, and reactive to light. Right pupil is round and reactive. Left pupil is round and reactive.   Neck: Trachea normal and normal range of motion. Neck supple. No JVD present. No thyromegaly present.   Cardiovascular: Normal rate, regular rhythm, normal heart sounds and intact distal pulses.    Pulmonary/Chest: Effort normal and breath sounds normal. No tachypnea. No respiratory distress.   Abdominal: Soft. Bowel sounds are normal. There is no tenderness.   abd obesity   Musculoskeletal: Normal range of motion.   Lymphadenopathy:     She has no cervical adenopathy.   Neurological: She is alert and oriented to person, place, and time. She has normal strength.   Skin: Skin is warm and dry. No rash noted.   Psychiatric: She has a normal mood and affect. Her speech is normal.   Nursing note and vitals reviewed.    Protective Sensation (w/ 10 gram monofilament):  Right: Intact  Left: Intact    Visual Inspection:  Normal -  Bilateral    Pedal Pulses:   Right: Present  Left: Present    Posterior tibialis:   Right:Present  Left: Present      Assessment:       1. Uncontrolled type 2 diabetes mellitus with hyperglycemia, with  long-term current use of insulin    2. Uncontrolled type 2 diabetes mellitus with stage 5 chronic kidney disease not on chronic dialysis, with long-term current use of insulin    3. Hypertension associated with stage 5 chronic kidney disease due to type 2 diabetes mellitus    4. BMI 35.0-35.9,adult    5. Dysuria    6. Bone disease    7. Reactive depression    8. Environmental allergies    9. Acquired hypothyroidism    10. Gastroesophageal reflux disease without esophagitis         Plan:           Uncontrolled type 2 diabetes mellitus with hyperglycemia, with long-term current use of insulin  -     Microalbumin/creatinine urine ratio  -     Lipid panel; Future; Expected date: 07/02/2018  -     Discontinue: atorvastatin (LIPITOR) 20 MG tablet; Take 1 tablet (20 mg total) by mouth once daily. (Patient taking differently: Take 40 mg by mouth once daily. )  Dispense: 30 tablet; Refill: 5    Uncontrolled type 2 diabetes mellitus with stage 5 chronic kidney disease not on chronic dialysis, with long-term current use of insulin  -     Microalbumin/creatinine urine ratio  -     Lipid panel; Future; Expected date: 07/02/2018  -     Discontinue: atorvastatin (LIPITOR) 20 MG tablet; Take 1 tablet (20 mg total) by mouth once daily. (Patient taking differently: Take 40 mg by mouth once daily. )  Dispense: 30 tablet; Refill: 5    Hypertension associated with stage 5 chronic kidney disease due to type 2 diabetes mellitus         -     Lisinopril 10mg po q day    BMI 35.0-35.9,adult         -     Dietary recs    Dysuria  -     Urine culture    Bone disease  -     DXA Bone Density Spine And Hip  -     insulin glargine (BASAGLAR KWIKPEN U-100 INSULIN) 100 unit/mL (3 mL) InPn pen; Inject 90 Units into the skin 2 (two) times daily. 90 units SQ QAM, 90 units SQ QPM  Dispense: 3 Box; Refill: 5    Reactive depression  -     vilazodone (VIIBRYD) 40 mg Tab tablet; Take 1 tablet (40 mg total) by mouth once daily.  Dispense: 30 tablet; Refill:  5  -     LORazepam (ATIVAN) 1 MG tablet; Take 1 tablet (1 mg total) by mouth 2 (two) times daily.  Dispense: 60 tablet; Refill: 2    Environmental allergies  -     loratadine (CLARITIN) 10 mg tablet; Take 1 tablet (10 mg total) by mouth once daily.    Acquired hypothyroidism  -     levothyroxine (SYNTHROID) 75 MCG tablet; Take 1 tablet (75 mcg total) by mouth once daily.  Dispense: 30 tablet; Refill: 5    Gastroesophageal reflux disease without esophagitis  -     hydrALAZINE (APRESOLINE) 25 MG tablet; Take 1 tablet (25 mg total) by mouth every 8 (eight) hours. (Patient taking differently: Take 25 mg by mouth 2 (two) times daily. )  Dispense: 90 tablet; Refill: 0  -     famotidine (PEPCID) 20 MG tablet; Take 1 tablet (20 mg total) by mouth nightly as needed for Heartburn.  Dispense: 30 tablet; Refill: 5    Other orders  -     Cancel: Hemoglobin A1C, POCT  -     lisinopril 10 MG tablet; Take 1 tablet (10 mg total) by mouth once daily.  Dispense: 90 tablet; Refill: 1

## 2018-07-03 ENCOUNTER — HOSPITAL ENCOUNTER (EMERGENCY)
Facility: HOSPITAL | Age: 58
Discharge: HOME | End: 2018-07-03
Attending: EMERGENCY MEDICINE
Payer: MEDICARE

## 2018-07-03 VITALS
DIASTOLIC BLOOD PRESSURE: 85 MMHG | WEIGHT: 228 LBS | TEMPERATURE: 98 F | OXYGEN SATURATION: 97 % | BODY MASS INDEX: 35.79 KG/M2 | HEART RATE: 77 BPM | RESPIRATION RATE: 18 BRPM | HEIGHT: 67 IN | SYSTOLIC BLOOD PRESSURE: 220 MMHG

## 2018-07-03 DIAGNOSIS — E11.65 UNCONTROLLED TYPE 2 DIABETES MELLITUS WITH HYPERGLYCEMIA, WITH LONG-TERM CURRENT USE OF INSULIN: ICD-10-CM

## 2018-07-03 DIAGNOSIS — M62.830 BACK SPASM: Primary | ICD-10-CM

## 2018-07-03 DIAGNOSIS — I10 HYPERTENSION, UNCONTROLLED: ICD-10-CM

## 2018-07-03 DIAGNOSIS — Z79.4 UNCONTROLLED TYPE 2 DIABETES MELLITUS WITH HYPERGLYCEMIA, WITH LONG-TERM CURRENT USE OF INSULIN: ICD-10-CM

## 2018-07-03 LAB
ALBUMIN SERPL BCP-MCNC: 3.6 G/DL
ALP SERPL-CCNC: 212 U/L
ALT SERPL W/O P-5'-P-CCNC: 22 U/L
ANION GAP SERPL CALC-SCNC: 11 MMOL/L
AST SERPL-CCNC: 19 U/L
BACTERIA #/AREA URNS HPF: ABNORMAL /HPF
BASOPHILS # BLD AUTO: 0 K/UL
BASOPHILS NFR BLD: 0.5 %
BILIRUB SERPL-MCNC: 0.3 MG/DL
BILIRUB UR QL STRIP: NEGATIVE
BUN SERPL-MCNC: 51 MG/DL
CALCIUM SERPL-MCNC: 9.2 MG/DL
CHLORIDE SERPL-SCNC: 111 MMOL/L
CLARITY UR: CLEAR
CO2 SERPL-SCNC: 19 MMOL/L
COLOR UR: YELLOW
CREAT SERPL-MCNC: 3.2 MG/DL
DIFFERENTIAL METHOD: ABNORMAL
EOSINOPHIL # BLD AUTO: 0.4 K/UL
EOSINOPHIL NFR BLD: 4.2 %
ERYTHROCYTE [DISTWIDTH] IN BLOOD BY AUTOMATED COUNT: 16.4 %
EST. GFR  (AFRICAN AMERICAN): 17 ML/MIN/1.73 M^2
EST. GFR  (NON AFRICAN AMERICAN): 15 ML/MIN/1.73 M^2
GLUCOSE SERPL-MCNC: 224 MG/DL
GLUCOSE UR QL STRIP: ABNORMAL
HCT VFR BLD AUTO: 35.1 %
HGB BLD-MCNC: 11.7 G/DL
HGB UR QL STRIP: ABNORMAL
HYALINE CASTS #/AREA URNS LPF: 2 /LPF
KETONES UR QL STRIP: NEGATIVE
LEUKOCYTE ESTERASE UR QL STRIP: ABNORMAL
LYMPHOCYTES # BLD AUTO: 2.3 K/UL
LYMPHOCYTES NFR BLD: 26 %
MCH RBC QN AUTO: 31 PG
MCHC RBC AUTO-ENTMCNC: 33.3 G/DL
MCV RBC AUTO: 93 FL
MICROSCOPIC COMMENT: ABNORMAL
MONOCYTES # BLD AUTO: 0.4 K/UL
MONOCYTES NFR BLD: 4.7 %
NEUTROPHILS # BLD AUTO: 5.8 K/UL
NEUTROPHILS NFR BLD: 64.6 %
NITRITE UR QL STRIP: NEGATIVE
PH UR STRIP: 6 [PH] (ref 5–8)
PLATELET # BLD AUTO: 178 K/UL
PMV BLD AUTO: 8.1 FL
POTASSIUM SERPL-SCNC: 4.5 MMOL/L
PROT SERPL-MCNC: 7 G/DL
PROT UR QL STRIP: ABNORMAL
RBC # BLD AUTO: 3.77 M/UL
RBC #/AREA URNS HPF: 2 /HPF (ref 0–4)
SODIUM SERPL-SCNC: 141 MMOL/L
SP GR UR STRIP: 1.01 (ref 1–1.03)
SQUAMOUS #/AREA URNS HPF: 40 /HPF
URN SPEC COLLECT METH UR: ABNORMAL
UROBILINOGEN UR STRIP-ACNC: NEGATIVE EU/DL
WBC # BLD AUTO: 9 K/UL
WBC #/AREA URNS HPF: 12 /HPF (ref 0–5)

## 2018-07-03 PROCEDURE — 85025 COMPLETE CBC W/AUTO DIFF WBC: CPT | Mod: NTX

## 2018-07-03 PROCEDURE — 81000 URINALYSIS NONAUTO W/SCOPE: CPT | Mod: NTX

## 2018-07-03 PROCEDURE — 99283 EMERGENCY DEPT VISIT LOW MDM: CPT | Mod: 25,NTX

## 2018-07-03 PROCEDURE — 96374 THER/PROPH/DIAG INJ IV PUSH: CPT | Mod: NTX

## 2018-07-03 PROCEDURE — 63600175 PHARM REV CODE 636 W HCPCS: Mod: NTX | Performed by: EMERGENCY MEDICINE

## 2018-07-03 PROCEDURE — 25000003 PHARM REV CODE 250: Mod: NTX | Performed by: EMERGENCY MEDICINE

## 2018-07-03 PROCEDURE — 36415 COLL VENOUS BLD VENIPUNCTURE: CPT | Mod: NTX

## 2018-07-03 PROCEDURE — 87086 URINE CULTURE/COLONY COUNT: CPT | Mod: NTX

## 2018-07-03 PROCEDURE — 80053 COMPREHEN METABOLIC PANEL: CPT | Mod: NTX

## 2018-07-03 RX ORDER — HYDRALAZINE HYDROCHLORIDE 25 MG/1
25 TABLET, FILM COATED ORAL
Status: COMPLETED | OUTPATIENT
Start: 2018-07-03 | End: 2018-07-03

## 2018-07-03 RX ORDER — HYDROMORPHONE HYDROCHLORIDE 1 MG/ML
1 INJECTION, SOLUTION INTRAMUSCULAR; INTRAVENOUS; SUBCUTANEOUS
Status: COMPLETED | OUTPATIENT
Start: 2018-07-03 | End: 2018-07-03

## 2018-07-03 RX ORDER — OXYCODONE AND ACETAMINOPHEN 5; 325 MG/1; MG/1
1 TABLET ORAL EVERY 4 HOURS PRN
Qty: 18 TABLET | Refills: 0 | Status: SHIPPED | OUTPATIENT
Start: 2018-07-03 | End: 2018-07-19

## 2018-07-03 RX ORDER — CLONIDINE HYDROCHLORIDE 0.1 MG/1
0.1 TABLET ORAL
Status: COMPLETED | OUTPATIENT
Start: 2018-07-03 | End: 2018-07-03

## 2018-07-03 RX ORDER — DIAZEPAM 5 MG/1
5 TABLET ORAL
Status: COMPLETED | OUTPATIENT
Start: 2018-07-03 | End: 2018-07-03

## 2018-07-03 RX ORDER — LISINOPRIL 10 MG/1
10 TABLET ORAL
Status: COMPLETED | OUTPATIENT
Start: 2018-07-03 | End: 2018-07-03

## 2018-07-03 RX ORDER — DIAZEPAM 5 MG/1
5 TABLET ORAL EVERY 6 HOURS PRN
Qty: 10 TABLET | Refills: 0 | Status: SHIPPED | OUTPATIENT
Start: 2018-07-03 | End: 2018-07-19

## 2018-07-03 RX ADMIN — DIAZEPAM 5 MG: 5 TABLET ORAL at 07:07

## 2018-07-03 RX ADMIN — HYDROMORPHONE HYDROCHLORIDE 1 MG: 1 INJECTION, SOLUTION INTRAMUSCULAR; INTRAVENOUS; SUBCUTANEOUS at 07:07

## 2018-07-03 RX ADMIN — HYDRALAZINE HYDROCHLORIDE 25 MG: 25 TABLET, FILM COATED ORAL at 08:07

## 2018-07-03 RX ADMIN — CLONIDINE HYDROCHLORIDE 0.1 MG: 0.1 TABLET ORAL at 08:07

## 2018-07-03 RX ADMIN — LISINOPRIL 10 MG: 10 TABLET ORAL at 08:07

## 2018-07-03 NOTE — TELEPHONE ENCOUNTER
----- Message from Gita Cohen MD sent at 7/2/2018 10:37 PM CDT -----  Increase statin t0 40 mg and refill rx with same-until then take 2 of her present 20 mg dose

## 2018-07-03 NOTE — TELEPHONE ENCOUNTER
TOLD THE PATIENT THAT WE ARE INCREASING STATIN AND THAT SHE COULD DOUBLE THE 20 MG UNTIL OUT WE WILL SEND NEW SCRIPT TO THE PHARMACY SHE VOICED UNDERSTANDING

## 2018-07-04 NOTE — ED PROVIDER NOTES
"Encounter Date: 7/3/2018    SCRIBE #1 NOTE: I, Jaymie Lawrence , am scribing for, and in the presence of,  Dr. Angulo . I have scribed the entire note.       History     Chief Complaint   Patient presents with    Flank Pain     Right sided pain started last Pm      07/03/2018  7:26 PM     Chief Complaint: R flank pain       The patient is a 58 y.o. female who is presenting with the gradual onset of R flank pain that began x 1 day ago while at rest. The pt states that the pain is constant, "aching", and feels that she "cannot get comfortable". Pt denies any exacerbating or mitigating factors. She endorses associated "ammonia smelling urine" that began several days ago and has since resolved. No associated sx including numbness, weakness, sweating, hematuria, nausea, abdominal pain, emesis, or diarrhea. Pt endorses hx of back pain, but states that this is "different than normal". Pt denies recent increase in activity but notes that she missed hemodialysis x 1 days ago. Pertinent PMHx includes Anemia; Arthritis; Diabetes mellitus; Hyperlipidemia; Hypertension; Low back pain; Renal disorder; Rotator cuff syndrome--left; Thyroid disease; and Ulnar neuropathy of right upper extremity. Pertinent past surgical hx includes dialysis fistula creation.                 The history is provided by the patient, medical records and a friend.     Review of patient's allergies indicates:   Allergen Reactions    Codeine Itching     Can take oxycodone and hydrocodone with Benadryl     Past Medical History:   Diagnosis Date    Anemia     Arthritis     Asthma     allergic airway    Depression     Diabetes mellitus     Eosinophilia     Gout     Gout     Hyperlipidemia     Hypertension     Low back pain     MRSA carrier     Obesity     Renal disorder     Rotator cuff syndrome--left     Thyroid disease     Ulnar neuropathy of right upper extremity      Past Surgical History:   Procedure Laterality Date    ACHILLES TENDON " SURGERY Left May 2015    BACK SURGERY      CHOLECYSTECTOMY      COLONOSCOPY N/A 9/6/2017    Procedure: COLONOSCOPY;  Surgeon: Marisol Bryson MD;  Location: [facility];  Service: Endoscopy;  Laterality: N/A;    DIALYSIS FISTULA CREATION      HEMORRHOID SURGERY      JOINT REPLACEMENT      left    KNEE SURGERY      SHOULDER ARTHROSCOPY      SHOULDER SURGERY       Family History   Problem Relation Age of Onset    Diabetes Mother     Alzheimer's disease Mother     Thyroid disease Mother     Hyperlipidemia Mother     Heart disease Father     Stroke Father     Diabetes Sister     Lupus Sister     Ovarian cancer Sister     Heart disease Brother      Social History   Substance Use Topics    Smoking status: Never Smoker    Smokeless tobacco: Never Used    Alcohol use No     Review of Systems   Constitutional: Negative for fever.   HENT: Negative for sore throat.    Eyes: Negative for visual disturbance.   Respiratory: Negative for shortness of breath.    Cardiovascular: Negative for chest pain.   Gastrointestinal: Negative for nausea.   Genitourinary: Positive for flank pain. Negative for dysuria.        Foul smelling urine, now resolved    Skin: Negative for rash.   Neurological: Negative for weakness.   Hematological: Does not bruise/bleed easily.       Physical Exam     Initial Vitals [07/03/18 1858]   BP Pulse Resp Temp SpO2   (!) 190/81 77 18 98.1 °F (36.7 °C) 97 %      MAP       --         Physical Exam    Nursing note and vitals reviewed.  Constitutional: She appears well-developed and well-nourished.  Non-toxic appearance. No distress.   HENT:   Head: Normocephalic and atraumatic.   Eyes: EOM are normal. Pupils are equal, round, and reactive to light.   Neck: Normal range of motion. Neck supple. No neck rigidity. No JVD present.   Cardiovascular: Normal rate, regular rhythm and intact distal pulses. Exam reveals no gallop and no friction rub.    Murmur heard.   Systolic murmur is present    Pulmonary/Chest: Breath sounds normal. She has no wheezes. She has no rhonchi. She has no rales.   Abdominal: Soft. Bowel sounds are normal. She exhibits no distension. There is no tenderness. There is no rigidity, no rebound and no guarding.   Musculoskeletal: Normal range of motion. She exhibits tenderness. She exhibits no edema.   TTP to palpation on R thoracic paraspinals.    Neurological: She is alert and oriented to person, place, and time. She has normal strength and normal reflexes. No cranial nerve deficit or sensory deficit. She exhibits normal muscle tone. Coordination normal. GCS eye subscore is 4. GCS verbal subscore is 5. GCS motor subscore is 6.   Skin: Skin is warm and dry.   Psychiatric: She has a normal mood and affect. Her speech is normal and behavior is normal. She is not actively hallucinating.         ED Course   Procedures  Labs Reviewed   CBC W/ AUTO DIFFERENTIAL - Abnormal; Notable for the following:        Result Value    RBC 3.77 (*)     Hemoglobin 11.7 (*)     Hematocrit 35.1 (*)     RDW 16.4 (*)     MPV 8.1 (*)     All other components within normal limits   COMPREHENSIVE METABOLIC PANEL - Abnormal; Notable for the following:     Chloride 111 (*)     CO2 19 (*)     Glucose 224 (*)     BUN, Bld 51 (*)     Creatinine 3.2 (*)     Alkaline Phosphatase 212 (*)     eGFR if  17 (*)     eGFR if non  15 (*)     All other components within normal limits   URINALYSIS - Abnormal; Notable for the following:     Protein, UA 2+ (*)     Glucose, UA 1+ (*)     Occult Blood UA Trace (*)     Leukocytes, UA Trace (*)     All other components within normal limits   URINALYSIS MICROSCOPIC - Abnormal; Notable for the following:     WBC, UA 12 (*)     Bacteria, UA Few (*)     Hyaline Casts, UA 2 (*)     All other components within normal limits   CULTURE, URINE          Imaging Results    None          Medical Decision Making:   History:   Old Medical Records: I decided to  obtain old medical records.  Initial Assessment:   58-year-old woman with a history of hypertension, diabetes and end-stage renal disease who presents to emergency department complaining right-sided back pain. Pain began last night while in bed.  It is been constant throughout the day.  She has no fever.  No hematuria or abdominal pain.  There is no new numbness or weakness of the legs.  She has normal distal pulses. I have low suspicion for aortic dissection or leaking aortic aneurysm.  She has completely reproducible tenderness over her right paraspinal muscles but not her left.  There is muscle spasms present on the right thoracic paraspinal muscles.  She has a history of back pain. I believe her pain is musculoskeletal in origin.  She is given Dilaudid and Valium with significant improvement in her pain to 2/10 in severity.  Incidentally she is also found to be hypertensive.  Patient states her blood pressures are normally 220 use over 90s when she presents dialysis and go down the 170s systolic after dialysis but are up in the 220s shortly afterwards when she arrives home.  This likely represents chronic uncontrolled hypertension and does not necessitate emergent reduction in the emergency department.  Laboratory evaluation was performed she shows no evidence need for emergent dialysis.  Patient will be provided with pain medication and Valium for her musculoskeletal back pain.  Return precautions were discussed.  She is discharged improved in no acute distress.  Clinical Tests:   Lab Tests: Ordered and Reviewed  ED Management:  8:43 PM  Pt states that pain is improved, and currently a 2/10. Informed pt of her contaminated UA and offered cath clean sample, but pt declined and prefers urine culture.  Pt's BP is elevated, however pts states that this is chronic I will give her home dose of hydralazine, Lisinopril, and .1 mg of Clonidine                         Clinical Impression:   The primary encounter  diagnosis was Back spasm. A diagnosis of Hypertension, uncontrolled was also pertinent to this visit.            I, Kev Marc, personally performed the services described in this documentation. All medical record entries made by the scribe were at my direction and in my presence.  I have reviewed the chart and agree that the record reflects my personal performance and is accurate and complete. Jeronimo Angulo MD.  10:39 PM 07/03/2018                   Jeronimo Angulo MD  07/03/18 4578

## 2018-07-04 NOTE — DISCHARGE INSTRUCTIONS
Thank you  for allowing me to care for you today.  I hope our treatment plan will make you feel better in the next few days.  In order for me to take better care of my future patients and improve our Emergency Department, I would appreciate if you can provide me with feedback.  In the next few days, you may receive a survey in the mail.  If you do, it would mean a great deal to me if you would please take the time to complete it.    Thank you and I hope you feel better,    Dr. Angulo

## 2018-07-05 LAB — BACTERIA UR CULT: NO GROWTH

## 2018-07-05 RX ORDER — ATORVASTATIN CALCIUM 40 MG/1
40 TABLET, FILM COATED ORAL DAILY
Qty: 90 TABLET | Refills: 1 | Status: SHIPPED | OUTPATIENT
Start: 2018-07-05 | End: 2019-01-01 | Stop reason: SDUPTHER

## 2018-07-06 ENCOUNTER — HOSPITAL ENCOUNTER (EMERGENCY)
Facility: HOSPITAL | Age: 58
Discharge: HOME | End: 2018-07-06
Attending: EMERGENCY MEDICINE
Payer: MEDICARE

## 2018-07-06 VITALS
BODY MASS INDEX: 35.71 KG/M2 | OXYGEN SATURATION: 95 % | WEIGHT: 228 LBS | SYSTOLIC BLOOD PRESSURE: 209 MMHG | RESPIRATION RATE: 16 BRPM | TEMPERATURE: 97 F | HEART RATE: 63 BPM | DIASTOLIC BLOOD PRESSURE: 93 MMHG

## 2018-07-06 DIAGNOSIS — M54.9 BACK PAIN: ICD-10-CM

## 2018-07-06 DIAGNOSIS — M54.6 ACUTE RIGHT-SIDED THORACIC BACK PAIN: Primary | ICD-10-CM

## 2018-07-06 LAB
ALBUMIN SERPL BCP-MCNC: 3.7 G/DL
ALP SERPL-CCNC: 190 U/L
ALT SERPL W/O P-5'-P-CCNC: 21 U/L
ANION GAP SERPL CALC-SCNC: 9 MMOL/L
AST SERPL-CCNC: 19 U/L
BASOPHILS # BLD AUTO: 0 K/UL
BASOPHILS NFR BLD: 0.5 %
BILIRUB SERPL-MCNC: 0.3 MG/DL
BUN SERPL-MCNC: 32 MG/DL
CALCIUM SERPL-MCNC: 9.3 MG/DL
CHLORIDE SERPL-SCNC: 104 MMOL/L
CO2 SERPL-SCNC: 27 MMOL/L
CREAT SERPL-MCNC: 2.2 MG/DL
D DIMER PPP IA.FEU-MCNC: 0.42 MG/L FEU
DIFFERENTIAL METHOD: ABNORMAL
EOSINOPHIL # BLD AUTO: 0.2 K/UL
EOSINOPHIL NFR BLD: 3.6 %
ERYTHROCYTE [DISTWIDTH] IN BLOOD BY AUTOMATED COUNT: 15.9 %
EST. GFR  (AFRICAN AMERICAN): 27 ML/MIN/1.73 M^2
EST. GFR  (NON AFRICAN AMERICAN): 24 ML/MIN/1.73 M^2
GLUCOSE SERPL-MCNC: 140 MG/DL
HCT VFR BLD AUTO: 37 %
HGB BLD-MCNC: 12.4 G/DL
LYMPHOCYTES # BLD AUTO: 1.2 K/UL
LYMPHOCYTES NFR BLD: 18.2 %
MCH RBC QN AUTO: 30.9 PG
MCHC RBC AUTO-ENTMCNC: 33.4 G/DL
MCV RBC AUTO: 93 FL
MONOCYTES # BLD AUTO: 0.3 K/UL
MONOCYTES NFR BLD: 4.5 %
NEUTROPHILS # BLD AUTO: 4.9 K/UL
NEUTROPHILS NFR BLD: 73.2 %
PLATELET # BLD AUTO: 174 K/UL
PMV BLD AUTO: 8.1 FL
POTASSIUM SERPL-SCNC: 4.6 MMOL/L
PROT SERPL-MCNC: 7.5 G/DL
RBC # BLD AUTO: 4 M/UL
SODIUM SERPL-SCNC: 140 MMOL/L
TROPONIN I SERPL DL<=0.01 NG/ML-MCNC: 0.01 NG/ML
WBC # BLD AUTO: 6.6 K/UL

## 2018-07-06 PROCEDURE — 84484 ASSAY OF TROPONIN QUANT: CPT | Mod: NTX

## 2018-07-06 PROCEDURE — 93005 ELECTROCARDIOGRAM TRACING: CPT | Mod: NTX

## 2018-07-06 PROCEDURE — 25500020 PHARM REV CODE 255: Mod: NTX

## 2018-07-06 PROCEDURE — 36415 COLL VENOUS BLD VENIPUNCTURE: CPT | Mod: NTX

## 2018-07-06 PROCEDURE — 96374 THER/PROPH/DIAG INJ IV PUSH: CPT | Mod: NTX,59

## 2018-07-06 PROCEDURE — 85379 FIBRIN DEGRADATION QUANT: CPT | Mod: NTX

## 2018-07-06 PROCEDURE — 80053 COMPREHEN METABOLIC PANEL: CPT | Mod: NTX

## 2018-07-06 PROCEDURE — 63600175 PHARM REV CODE 636 W HCPCS: Mod: NTX | Performed by: EMERGENCY MEDICINE

## 2018-07-06 PROCEDURE — 85025 COMPLETE CBC W/AUTO DIFF WBC: CPT | Mod: NTX

## 2018-07-06 PROCEDURE — 99285 EMERGENCY DEPT VISIT HI MDM: CPT | Mod: 25,NTX

## 2018-07-06 PROCEDURE — 25000003 PHARM REV CODE 250: Mod: NTX | Performed by: EMERGENCY MEDICINE

## 2018-07-06 RX ORDER — HYDROMORPHONE HYDROCHLORIDE 1 MG/ML
1 INJECTION, SOLUTION INTRAMUSCULAR; INTRAVENOUS; SUBCUTANEOUS
Status: COMPLETED | OUTPATIENT
Start: 2018-07-06 | End: 2018-07-06

## 2018-07-06 RX ORDER — ONDANSETRON 4 MG/1
4 TABLET, ORALLY DISINTEGRATING ORAL
Status: COMPLETED | OUTPATIENT
Start: 2018-07-06 | End: 2018-07-06

## 2018-07-06 RX ORDER — SODIUM CHLORIDE 9 MG/ML
INJECTION, SOLUTION INTRAVENOUS
Status: DISCONTINUED
Start: 2018-07-06 | End: 2018-07-06 | Stop reason: HOSPADM

## 2018-07-06 RX ADMIN — ONDANSETRON 4 MG: 4 TABLET, ORALLY DISINTEGRATING ORAL at 10:07

## 2018-07-06 RX ADMIN — IOHEXOL 100 ML: 350 INJECTION, SOLUTION INTRAVENOUS at 11:07

## 2018-07-06 RX ADMIN — HYDROMORPHONE HYDROCHLORIDE 1 MG: 1 INJECTION, SOLUTION INTRAMUSCULAR; INTRAVENOUS; SUBCUTANEOUS at 09:07

## 2018-07-11 ENCOUNTER — TELEPHONE (OUTPATIENT)
Dept: INTERNAL MEDICINE | Facility: CLINIC | Age: 58
End: 2018-07-11

## 2018-07-11 ENCOUNTER — LAB VISIT (OUTPATIENT)
Dept: LAB | Facility: HOSPITAL | Age: 58
End: 2018-07-11
Payer: MEDICARE

## 2018-07-11 DIAGNOSIS — Z01.818 PRE-OP EXAMINATION: ICD-10-CM

## 2018-07-11 PROCEDURE — 99001 SPECIMEN HANDLING PT-LAB: CPT | Mod: PO,TXP

## 2018-07-11 NOTE — TELEPHONE ENCOUNTER
----- Message from Gita Cohen MD sent at 7/6/2018  1:34 PM CDT -----  Please tell COURTNEY cceptable

## 2018-07-12 DIAGNOSIS — K21.9 GASTROESOPHAGEAL REFLUX DISEASE WITHOUT ESOPHAGITIS: ICD-10-CM

## 2018-07-16 VITALS — HEIGHT: 66 IN | BODY MASS INDEX: 36.64 KG/M2 | WEIGHT: 228 LBS

## 2018-07-16 RX ORDER — HYDRALAZINE HYDROCHLORIDE 25 MG/1
25 TABLET, FILM COATED ORAL EVERY 8 HOURS
Qty: 90 TABLET | Refills: 0 | Status: SHIPPED | OUTPATIENT
Start: 2018-07-16 | End: 2018-08-27 | Stop reason: SDUPTHER

## 2018-07-19 ENCOUNTER — OFFICE VISIT (OUTPATIENT)
Dept: TRANSPLANT | Facility: CLINIC | Age: 58
End: 2018-07-19
Payer: COMMERCIAL

## 2018-07-19 ENCOUNTER — LAB VISIT (OUTPATIENT)
Dept: LAB | Facility: HOSPITAL | Age: 58
End: 2018-07-19
Attending: INTERNAL MEDICINE
Payer: MEDICARE

## 2018-07-19 VITALS
WEIGHT: 227.06 LBS | HEART RATE: 93 BPM | RESPIRATION RATE: 17 BRPM | DIASTOLIC BLOOD PRESSURE: 101 MMHG | SYSTOLIC BLOOD PRESSURE: 176 MMHG | TEMPERATURE: 98 F | OXYGEN SATURATION: 96 % | HEIGHT: 66 IN | BODY MASS INDEX: 36.49 KG/M2

## 2018-07-19 DIAGNOSIS — Z99.2 ANEMIA IN CHRONIC KIDNEY DISEASE, ON CHRONIC DIALYSIS: ICD-10-CM

## 2018-07-19 DIAGNOSIS — M16.10 HIP ARTHRITIS: ICD-10-CM

## 2018-07-19 DIAGNOSIS — E78.5 HYPERLIPIDEMIA, UNSPECIFIED HYPERLIPIDEMIA TYPE: Chronic | ICD-10-CM

## 2018-07-19 DIAGNOSIS — M19.90 ARTHRITIS: Chronic | ICD-10-CM

## 2018-07-19 DIAGNOSIS — Z76.82 ORGAN TRANSPLANT CANDIDATE: ICD-10-CM

## 2018-07-19 DIAGNOSIS — N18.6 ESRD ON DIALYSIS: Primary | ICD-10-CM

## 2018-07-19 DIAGNOSIS — I10 ESSENTIAL HYPERTENSION: ICD-10-CM

## 2018-07-19 DIAGNOSIS — Z76.82 AWAITING ORGAN TRANSPLANT STATUS: ICD-10-CM

## 2018-07-19 DIAGNOSIS — N18.4 CHRONIC KIDNEY DISEASE, STAGE IV (SEVERE): ICD-10-CM

## 2018-07-19 DIAGNOSIS — F32.A DEPRESSION, UNSPECIFIED DEPRESSION TYPE: ICD-10-CM

## 2018-07-19 DIAGNOSIS — D63.1 ANEMIA IN CHRONIC KIDNEY DISEASE, ON CHRONIC DIALYSIS: ICD-10-CM

## 2018-07-19 DIAGNOSIS — Z22.322 MRSA (METHICILLIN RESISTANT STAPHYLOCOCCUS AUREUS) CARRIER: ICD-10-CM

## 2018-07-19 DIAGNOSIS — N18.6 ANEMIA IN CHRONIC KIDNEY DISEASE, ON CHRONIC DIALYSIS: ICD-10-CM

## 2018-07-19 DIAGNOSIS — E03.9 HYPOTHYROIDISM, UNSPECIFIED TYPE: ICD-10-CM

## 2018-07-19 DIAGNOSIS — Z99.2 ESRD ON DIALYSIS: Primary | ICD-10-CM

## 2018-07-19 LAB
CREAT CL/1.73 SQ M 12H UR+SERPL-ARVRAT: 26 ML/MIN
CREAT SERPL-MCNC: 3.1 MG/DL
CREAT UR-MCNC: 61.9 MG/DL
CREATININE, URINE (MG/SPEC): 1176.1 MG/SPEC
HLA FREEZE AND HOLD INTERPRETATION: NORMAL
HLA FREEZE AND HOLD INTERPRETATION: NORMAL
HLAFH COLLECTION DATE: NORMAL
HLAFH COLLECTION DATE: NORMAL
HPRA INTERPRETATION: NORMAL
HPRA INTERPRETATION: NORMAL
PTH-INTACT SERPL-MCNC: 266 PG/ML
URINE COLLECTION DURATION: 24 HR
URINE VOLUME: 1900 ML

## 2018-07-19 PROCEDURE — 86706 HEP B SURFACE ANTIBODY: CPT | Mod: TXP

## 2018-07-19 PROCEDURE — 99215 OFFICE O/P EST HI 40 MIN: CPT | Mod: S$PBB,TXP,, | Performed by: INTERNAL MEDICINE

## 2018-07-19 PROCEDURE — 99999 PR PBB SHADOW E&M-EST. PATIENT-LVL III: CPT | Mod: PBBFAC,TXP,, | Performed by: INTERNAL MEDICINE

## 2018-07-19 PROCEDURE — 99213 OFFICE O/P EST LOW 20 MIN: CPT | Mod: PBBFAC,TXP | Performed by: INTERNAL MEDICINE

## 2018-07-19 PROCEDURE — 83970 ASSAY OF PARATHORMONE: CPT | Mod: TXP

## 2018-07-19 PROCEDURE — 36415 COLL VENOUS BLD VENIPUNCTURE: CPT | Mod: TXP

## 2018-07-19 PROCEDURE — 82575 CREATININE CLEARANCE TEST: CPT | Mod: TXP

## 2018-07-19 NOTE — LETTER
July 19, 2018        Dalton Heaton  [address] [facility] [place]  [place] [address]  Phone: [phone]  Fax: [phone]             [facility]  [address]  [facility] [address]  Phone: [phone]   Patient: Andra Newell   MR Number: [id number]   YOB: 1960   Date of Visit: 7/19/2018       Dear Dr. Dalton Heaton    Thank you for referring Andra Newell to me for evaluation. Attached you will find relevant portions of my assessment and plan of care.    If you have questions, please do not hesitate to call me. I look forward to following Andra Newell along with you.    Sincerely,    Anahy Weiss MD    Enclosure    If you would like to receive this communication electronically, please contact [email or web address] or [phone] to request [employer] access.    [employer] is a tool which provides read-only access to select patient information with whom you have a relationship. Its easy to use and provides real time access to review your patients record including encounter summaries, notes, results, and demographic information.    If you feel you have received this communication in error or would no longer like to receive these types of communications, please e-mail [email or web address]

## 2018-07-19 NOTE — PROGRESS NOTES
LISTED PATIENT EDUCATION NOTE    Ms. Andra Newell was seen in pre-kidney transplant clinic for evaluation for kidney, kidney/pancreas or pancreas only transplant.  The patient attended a group education session that discussed/reviewed the following aspects of transplantation: evaluation and selection committee process, [facility] waitlist management/multiple listings, types of organs offered (KDPI < 85%, KDPI > 85%, PHS increased risk, DCD), financial aspects, surgical procedures, dietary instruction pre- and post-transplant, health maintenance pre- and post-transplant, post-transplant hospitalization and outpatient follow-up, potential to participate in a research protocol, and medication management and side effects.  A question and answer session was provided after the presentation.    The patient was seen by all members of the multi-disciplinary team to include: Nephrologist/PA, Surgeon, , Transplant Coordinator, , Pharmacist and Dietician (if applicable).    The patient reviewed and signed all consents for evaluation which were witnessed and sent to scanning into the EPIC chart.    The patient was given an education book and plan for further evaluation based on her individual assessment.      The patient was encouraged to call with any questions or concerns.

## 2018-07-19 NOTE — PATIENT INSTRUCTIONS
1. Try to exercise more, build up your strength   2. Work with your kidney doctor to better control your blood pressure

## 2018-07-19 NOTE — PROGRESS NOTES
Kidney Transplant Recipient Reevalulation    Referring Physician: Dalton Heaton  Current Nephrologist: Dalton Heaton  Waitlist Status: active  Dialysis Start Date: 4/13/2018    Subjective:     CC:  Annual reassessment of kidney transplant candidacy.    HPI:  Ms. Newell is a 58 y.o. year old White female with ESRD secondary to diabetic nephropathy and HTN +/- antibiotic induced nephrotoxicity (states she received 6 weeks of antibiotics for MRSA infection - possible osteomyelitis).  She has been on the wait list for a kidney transplant at [facility] since 11/27/2017. Patient is currently on hemodialysis started on 4/13/18. Patient is dialyzing on MWF schedule.  Patient reports that she is tolerating dialysis well. but reports that the dialysis unit staff will miss cannulation. She has a LUE AV fistula. Patient was last seen in initial RR clinic on 7/12/17.     She was seen in the ED with SOB, cramps, and headache on 10/21/17.     She was seen in the ED on 2/22/18 with epigastric/lower chest pain which resolved with GI cocktail and UTI.      She was admitted from 4/2/18 to 4/4/18 with intermittent mid-sternal chest pain. TTE during this admission on 4/3/18 with LVEF 55-60%. She had negative DSE on 7/26/17.     She was admitted again from 4/12/18 to 4/14/18 with worsened fatigue, frequent falls?, and SOB and it was during this hospitalization when she was started on HD. There are care management notes that state she is using power chair/scooter (one example is note by Monica Walter dated 4/13/18)    She was seen in the ED on 7/3/18 for right flank/ back pain felt to be musculoskeletal in origin.     She was seen in the ED on 7/6/18 with right thoracic paraspinal pain and had CTA which did not reveal PE or aortic dissection. She was sent home with Percocet and valium.     She has had significantly uncontrolled BP at various encounters - ED visits; hospitalizations, clinic visits.     Outpatient Labs:    4/16/18  4/23/18 5/14/18 7/9/18   WBC 2.7 9.7      Hb   9.9 9.4 11.8   Platelet count   256      Potassium   4.2   5.1   Albumin    3.8      PTH     188 231      She states for a while she was having frequent falls - states she thinks her equilibrium is off. She has had 3 knee surgeries all on left - which is the leg which had the MRSA/questionable osteo.     She doesn't really exercise secondary to her left knee issues. She has joined [employer] and goes to their pool once every 2 weeks but plans to go twice a week. States she will stay in the pool for 45 minutes walking/running the length of the pool and denies any SOB with that. She lives in a 1 story house. She will do minimal house cleaning, cooks twice a week. She will use the scooter if she goes to watch her grandson play baseball since that entails significant walking and she gets SOB and states when she goes grocery shopping she will use the power scooter. States she uses the scooter more because of her knee pain issues than any other symptoms. They  parked here at [facility] - she did walk around the hospital today but states she had frequent stops. States when walking back from the transplant clinic classroom she took a minute to catch her breath. Patient was able to walk with this writer from clinic room #10 to the front entrance of [facility] on [address] and back to clinic exam room without any BOYER, chest pain, or claudication. She was able to carry a conversation while walking.     She is accompanied to visit by her partner.     Review of Systems   Constitutional: +fatigue; Denies fever/chills, night sweats  EENT: +wears eye glasses; Denies vision problems, hearing problems, trouble swallowing.   Respiratory: +BOYER; +snoring - never been tested for sleep apnea; Denies orthopnea, wheezing, hemoptysis, denies known TB exposure or history of positive TB skin test  Cardiovascular: Denies chest pain, palpitations, history of MI, history of stroke, history of  DVT  Gastrointestinal: +she will get nausea when BP drops in dialysis to the 70s systolic - states this has occurred 3 times since she started dialysis; Denies abdominal pain, vomiting/diarrhea/constipation. Denies history of GI bleeding or ulcers.   Genitourinary: +states she urinates a lot - estimates 1.5L/day; Denies history of kidney stones, recurrent UTI's, history of urinary obstruction, hematuria, dysuria, urinary frequency, incontinence  Musculoskeletal: +left knee pain/stiffness - doesn't take any pain pills; Denies trouble moving extremities, denies joint pain or swelling  Skin: Denies history of skin cancer, denies rash, ulcerations  Heme/onc: +easy bruising; Denies any history of cancer  Endocrine: +lost 7 lbs since last transplant clinic visit; +hypothyroidism  Neurological: +peripheral neuropathy; Denies tremors, seizures, dizziness, headaches,   Psychiatric: +anxiety/depression - denies SI/HI; takes Ativan 1 mg BID. Denies hallucinations or delusions.    Potential Donors: yes - needing 24 hour BP monitoring to be approved per patient    Medications:  Current Outpatient Prescriptions   Medication Sig Dispense Refill    atorvastatin (LIPITOR) 40 MG tablet Take 1 tablet (40 mg total) by mouth once daily. 90 tablet 1    famotidine (PEPCID) 20 MG tablet Take 1 tablet (20 mg total) by mouth nightly as needed for Heartburn. 30 tablet 5    hydrALAZINE (APRESOLINE) 25 MG tablet Take 1 tablet (25 mg total) by mouth every 8 (eight) hours. 90 tablet 0    insulin glargine (BASAGLAR KWIKPEN U-100 INSULIN) 100 unit/mL (3 mL) InPn pen Inject 90 Units into the skin 2 (two) times daily. 90 units SQ QAM, 90 units SQ QPM 3 Box 5    levothyroxine (SYNTHROID) 75 MCG tablet Take 1 tablet (75 mcg total) by mouth once daily. 30 tablet 5    lisinopril 10 MG tablet Take 1 tablet (10 mg total) by mouth once daily. 90 tablet 1    loratadine (CLARITIN) 10 mg tablet Take 1 tablet (10 mg total) by mouth once daily.       "LORazepam (ATIVAN) 1 MG tablet Take 1 tablet (1 mg total) by mouth 2 (two) times daily. 60 tablet 2    vilazodone (VIIBRYD) 40 mg Tab tablet Take 1 tablet (40 mg total) by mouth once daily. 30 tablet 5     No current facility-administered medications for this visit.          Objective:   body mass index is 35.56 kg/m².   BP (!) 176/101 (BP Location: Right arm, Patient Position: Sitting, BP Method: Medium (Automatic))   Pulse 93   Temp 98 °F (36.7 °C) (Oral)   Resp 17   Ht 5' 5.5" (1.664 m)   Wt 103 kg (227 lb 1.2 oz)   LMP 02/28/2012   SpO2 96%   BMI 37.21 kg/m²       Physical Exam   General: No acute distress, well groomed, alert and oriented x 3  HEENT: Normocephalic, atraumatic, PERRLA, sclera anicteric, conjunctiva/corneas clear, EOM's intact bilaterally, external inspection of ears and nose normal, moist mucous membranes, no oral ulcerations/lesions   Neck: Supple, symmetrical, trachea midline, no masses, no carotid bruits, no JVD, thyroid is normal without nodules or enlargement   Respiratory: Clear to auscultation bilaterally, respirations unlabored, no rales/rhonchi/wheezing   Cardiovacular: Regular rate and rhythm, S1, S2 normal, no murmurs, no rubs or gallops   Gastrointestinal: Soft, non-tender, bowel sounds normal, no masses, no palpable organomegaly  Extremities: No clubbing or cyanosis of upper extremities bilaterally, no pedal edema bilaterally; +2 bilateral radial pulses  Skin: warm and dry; no rash on exposed skin  Lymph nodes: Cervical and supraclavicular nodes normal   Neurologic: No focal neurologic deficits.   Musculoskeletal: moves all extremities without difficulty, FROM, 5/5 strength, ambulates without an assistive device  Psychiatric: Normal mood and affect. Responds appropriately to questions.  Access: LUE AVF +thrill/bruit     Labs:  Lab Results   Component Value Date    WBC 6.60 07/06/2018    HGB 12.4 07/06/2018    HCT 37.0 07/06/2018     07/06/2018    K 4.6 07/06/2018    "  07/06/2018    CO2 27 07/06/2018    BUN 32 (H) 07/06/2018    CREATININE 3.1 (H) 07/19/2018    EGFRNONAA 24 (A) 07/06/2018    CALCIUM 9.3 07/06/2018    PHOS 3.6 04/14/2018    MG 2.3 04/14/2018    ALBUMIN 3.7 07/06/2018    AST 19 07/06/2018    ALT 21 07/06/2018    UTPCR 2.09 (H) 12/01/2017    .0 (H) 07/19/2018       Lab Results   Component Value Date    BILIRUBINUA Negative 07/03/2018    PROTEINUA 2+ (A) 07/03/2018    NITRITE Negative 07/03/2018    RBCUA 2 07/03/2018    WBCUA 12 (H) 07/03/2018       No results found for: HLAABCTYPE    Lab Results   Component Value Date    CPRA 43 06/04/2018    CPRA 86 06/04/2018    CPRA 77 06/04/2018    YP4AWSN  06/04/2018     A32,A3,A23,B27,B57,B51,B49,B44,B37,B58,A24,B52,B77,B47,B59    CIABCLM WEAK--B38 06/04/2018    CIIAB DQ7,DQ9 06/04/2018    ABCMT DRB1*12:01, WEAK-- DQB1*03:02 06/04/2018       Labs were reviewed with the patient.    Pre-transplant Workup:   Reviewed with the patient.    Assessment:     1. ESRD on dialysis    2. Arthritis    3. Anemia in chronic kidney disease, on chronic dialysis    4. Hyperlipidemia, unspecified hyperlipidemia type    5. Depression, unspecified depression type    6. Essential hypertension    7. MRSA (methicillin resistant Staphylococcus aureus) carrier    8. Hypothyroidism, unspecified type    9. Hip arthritis    10. Uncontrolled type 2 diabetes mellitus with stage 5 chronic kidney disease not on chronic dialysis, with long-term current use of insulin    11. Awaiting organ transplant status        Plan:     Transplant Candidacy:   Ms. Newell is a suitable kidney transplant candidate.  She remains in overall stable health, and will remain active on the transplant list.    Advised her to work with her nephrologist to optimize BP control.     Exercise: reminded Andra of the importance of regular exercise for weight management, blood sugar and blood pressure management.  I also explained exercise has been shown to improve  cardiovascular health, energy level, and sleep hygiene.  Lastly, I advised her that cardiovascular complications are leading cause of death and regular exercise can help lower this risk.      Anahy Weiss MD       Follow-up:   In addition to the tests noted in the plan, Ms. Newell will continue to have reevaluation as per the standing pre-kidney transplant protocol:  1. Monthly blood for PRA  2. Annual return to clinic, except HIV positive, > 65 years of age, or pancreas transplant candidates who will be scheduled to see transplant every 6 months while in pre-transplant phase  3. Annual re-testing: CXR, EKG, yearly mammograms for women over 40 and PSA for males over 40, cardiology follow-up as recommended by initial cardiology pre-transplant evaluation  4. Renal ultrasound every 2 years  5. Baseline colonoscopy after age 50 and repeated as recommended    UNOS Patient Status  Functional Status: 60% - Requires occasional assistance but is able to care for needs  Physical Capacity: No Limitations

## 2018-07-21 LAB
HEP. B SURF AB, QUAL: POSITIVE
HEP. B SURF AB, QUANT.: 114 MIU/ML

## 2018-07-23 ENCOUNTER — LAB VISIT (OUTPATIENT)
Dept: LAB | Facility: HOSPITAL | Age: 58
End: 2018-07-23
Attending: INTERNAL MEDICINE
Payer: MEDICARE

## 2018-07-23 DIAGNOSIS — N18.4 CHRONIC KIDNEY DISEASE, STAGE IV (SEVERE): Primary | ICD-10-CM

## 2018-07-23 LAB
ALBUMIN SERPL BCP-MCNC: 3.6 G/DL
ANION GAP SERPL CALC-SCNC: 8 MMOL/L
BASOPHILS # BLD AUTO: 0 K/UL
BASOPHILS NFR BLD: 0.4 %
BUN SERPL-MCNC: 45 MG/DL
CALCIUM SERPL-MCNC: 9.4 MG/DL
CHLORIDE SERPL-SCNC: 114 MMOL/L
CO2 SERPL-SCNC: 18 MMOL/L
CREAT SERPL-MCNC: 3 MG/DL
DIFFERENTIAL METHOD: ABNORMAL
EOSINOPHIL # BLD AUTO: 0.5 K/UL
EOSINOPHIL NFR BLD: 5.1 %
ERYTHROCYTE [DISTWIDTH] IN BLOOD BY AUTOMATED COUNT: 15.8 %
EST. GFR  (AFRICAN AMERICAN): 19 ML/MIN/1.73 M^2
EST. GFR  (NON AFRICAN AMERICAN): 16 ML/MIN/1.73 M^2
GLUCOSE SERPL-MCNC: 133 MG/DL
HCT VFR BLD AUTO: 38.1 %
HGB BLD-MCNC: 12.3 G/DL
LYMPHOCYTES # BLD AUTO: 2.1 K/UL
LYMPHOCYTES NFR BLD: 22.9 %
MCH RBC QN AUTO: 29.9 PG
MCHC RBC AUTO-ENTMCNC: 32.3 G/DL
MCV RBC AUTO: 93 FL
MONOCYTES # BLD AUTO: 0.5 K/UL
MONOCYTES NFR BLD: 5.6 %
NEUTROPHILS # BLD AUTO: 6.2 K/UL
NEUTROPHILS NFR BLD: 66 %
PHOSPHATE SERPL-MCNC: 4.8 MG/DL
PLATELET # BLD AUTO: 212 K/UL
PMV BLD AUTO: 8.3 FL
POTASSIUM SERPL-SCNC: 5 MMOL/L
RBC # BLD AUTO: 4.12 M/UL
SODIUM SERPL-SCNC: 140 MMOL/L
WBC # BLD AUTO: 9.4 K/UL

## 2018-07-23 PROCEDURE — 85025 COMPLETE CBC W/AUTO DIFF WBC: CPT | Mod: NTX

## 2018-07-23 PROCEDURE — 36415 COLL VENOUS BLD VENIPUNCTURE: CPT | Mod: NTX

## 2018-07-23 PROCEDURE — 80069 RENAL FUNCTION PANEL: CPT | Mod: NTX

## 2018-07-24 DIAGNOSIS — Z76.82 AWAITING ORGAN TRANSPLANT STATUS: Primary | ICD-10-CM

## 2018-07-27 NOTE — PROGRESS NOTES
Transplant Recipient Adult Psychosocial Assessment Update    Andra Newell  [address] [address]    Telephone Information:   Mobile [phone]     Work  There is no work phone number on file.  E-mail  [email or web address]    Sex: female  YOB: 1960  Age: 58 y.o.    Encounter Date: 7/19/2018  [place] Citizen: yes  Primary Language: English   Needed: no    Emergency Contact:  Any Alvarez, 59 yo partner/friend, Wayne Chin, does drive/own car, employed full time at [address]. [phone]    Family/Social Support:   Number of dependents/: None  Marital history:  x 2; in significant other relationship with Any Alvarez. Pt has known Any for 20 years.  Other family dynamics: Pt reports highly supportive family system. Pt reports having 2 sons Malik Palomo and Isaiah Palomo. Isaiah lives in [place] and will not be able to assist with transplant. Malik lives nearby in [place] and will be able to assist with transplant if needed. Pt reports having good friend Ashleigh Chen living in [place] who will be available to assist with transplant as needed. Pt's partner Any Alvarez was with pt for pre transplant work up and reports will be patient's primary organ transplant caregiver.    Household Composition:  Andra and Any Alvarez, her partner, share a home.  Any drives.  They have a dog.    Do you and your caregivers have access to reliable transportation? yes  PRIMARY CAREGIVER: Any Alvarez, partner, will be primary caregiver, phone number [phone].      provided in-depth information to patient and caregiver regarding pre- and post-transplant caregiver role.   strongly encourages patient and caregiver to have concrete plan regarding post-transplant care giving, including back-up caregiver(s) to ensure care giving needs are met as needed.    Patient and Caregiver states understanding all  aspects of caregiver role/commitment and is able/willing/committed to being caregiver to the fullest extent necessary.    Patient and Caregiver verbalizes understanding of the education provided today and caregiver responsibilities.         remains available. Patient and Caregiver agree to contact  in a timely manner if concerns arise.      Able to take time off work without financial concerns: yes.     Additional Significant Others who will Assist with Transplant:  Ashleigh Chen, 47 yo friend for 5 years, Wayne CANAS, does drive/own car, unemployed disabled (no issues being caregiver). [phone]  Malik Palomo, 41 yo son, Wayne CANAS, does drive/own car. [phone]  Isaiah Palomo, 34 yo son, [facility], does drive/own car, [employer]. [phone].  Unable to assist as caregiver due to job.    Living Will: yes  Healthcare Power of : yes  Advance Directives on file: <Received per medical record.  Verbally reviewed LW/HCPA information.   provided patient with copy of LW/HCPA documents and provided education on completion of forms.    Living Donors: Education and resource information given to patient.    Highest Education Level: High School (9-12) or GED  Reading Ability: 12th grade  Reports difficulty with: wears glasses. Pt denies any problems learning new information.  Learns Best By:  Reading about, seeing and doing new task until proficient     Status: no  VA Benefits: no     Working for Income: No  If no, reason not working: Disability    Patient reports is currently disabled since 2015 after work related injury. 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    Spouse/Significant Other Employment: Partner states she will be able to take time from work to assist.    Disabled: yes due to work injury 2015    Monthly Income:  $1500.00  Able to afford all costs now and if transplanted, including medications:  yes   Patient and Caregiver verbalizes understanding of personal responsibilities related to transplant costs and the importance of having a financial plan to ensure that patients transplant costs are fully covered.       provided fundraising information/education. Patient and Caregiververbalizes understanding.   remains available.    Insurance:   Payor/Plan Subscr  Sex Relation Sub. Ins. ID Effective Group Num   1. [employer] * KVNG NUNEZ 1902 Female  [id number]                                    [address] [place] [address]   2. [employer]* VIRGIE DKUE * 1960 Female  [id number] [address] [facility]                                   [address] [address]     Primary Insurance (for UNOS reporting): Public Insurance - Other Government  Secondary Insurance (for UNOS reporting): Private Insurance  Patient and Caregiver verbalizes clear understanding that patient may experience difficulty obtaining and/or be denied insurance coverage post-surgery. This includes and is not limited to disability insurance, life insurance, health insurance, burial insurance, long term care insurance, and other insurances.      Patient and Caregiver also reports understanding that future health concerns related to or unrelated to transplantation may not be covered by patient's insurance.  Resources and information provided and reviewed.     Patient and Caregiver provides verbal permission to release any necessary information to outside resources for patient care and discharge planning.  Resources and information provided are reviewed.      Pt reports usually obtains out pt medicines from [facility]. Pt reports is in agreement to using [facility] for transplant medicines.    Pt reports some caregivers work and may need employer paperwork completed for time missed due to transplant.    Pt reports understanding [facility] will not be assisting with any transplant related  costs such as lodging, meals and travel costs for transplant.    Dialysis Adherence: Patient reports is not currently on dialysis.  She states she is intermittently on dialysis as needed.  She stated she follows closely with nephrologist and has a hx of good compliance reports.  Lidia at dialysis unit confirms that she was compliant when on dialysis treatments twice a week.      Nephrologist: Dr. Dalton Heaton, [phone]. Pt has reported high compliance with all medical appointments and instructions.     Infusion Service: patient utilizing? no  Home Health: patient utilizing? no  DME: scooter for long distances.  Pulmonary/Cardiac Rehab: pt denies  ADLS:  Problems bathing, walking, housekeeping and cooking.    Adherence:   Pt reports high adherence with all medical office visits and instructions.  Adherence education and counseling provided.     Per History Section:  Past Medical History:   Diagnosis Date    Anemia     Anemia in chronic kidney disease, on chronic dialysis 7/12/2017    Arthritis     Asthma     allergic airway    Depression     Diabetes mellitus     Eosinophilia     ESRD on dialysis     Gout     Gout     Hyperlipidemia     Hypertension     Low back pain     MRSA carrier     Obesity     Renal disorder     Rotator cuff syndrome--left     Thyroid disease     Ulnar neuropathy of right upper extremity      Social History   Substance Use Topics    Smoking status: Never Smoker    Smokeless tobacco: Never Used    Alcohol use No     History   Drug Use No     History   Sexual Activity    Sexual activity: No       Per Today's Psychosocial:  Tobacco: none, patient denies any use.  Alcohol: none, patient denies any use.  Illicit Drugs/Non-prescribed Medications: none, patient denies any use.    Patient and Caregiver states clear understanding of the potential impact of substance use as it relates to transplant candidacy and is aware of possible random substance screening.  Substance  abstinence/cessation counseling, education and resources provided and reviewed.     Arrests/DWI/Treatment/Rehab: patient denies    Psychiatric History:    Mental Health: Pt reports history of suicidal ideation in 2015 after a significant life event of injury at work and impact to her ADL independence. Pt reports was in psychiatric hospital [facility] for stabilization and was referred to out pt therapy. Pt reports currently in weekly psychotherapy with Mague Muniz LCSW in [place]. Pt reports psychiatrist Dr. Sheth in [place] 1 x month.  Pt and partner report that pt is feeling very well emotionally and has no current symptoms.     Psychiatrist/Counselor: Dr. Sheth ([facility]) and Mague Muniz ([facility])  Medications:   Vilbryd 40 mg daily.   Suicide/Homicide Issues: 2015 [facility] hospitalization for si.  Reports no SI or HI currently and none since hospital stay in 2015.  Safety at home: Pt reports no problems with safety in the home.    Knowledge: Patient and Caregiver states having clear understanding and realistic expectations regarding the potential risks and potential benefits of organ transplantation and organ donation and agrees to discuss with health care team members and support system members, as well as to utilize available resources and express questions and/or concerns in order to further facilitate the pt informed decision-making.  Resources and information provided and reviewed.    Patient and Caregiver is aware of [facility]'s affiliation and/or partnership with agencies in home health care, LTAC, SNF, [facility], and other hospitals and clinics.    Understanding: Patient and Caregiver reports having a clear understanding of the many lifetime commitments involved with being a transplant recipient, including costs, compliance, medications, lab work, procedures, appointments, concrete and financial planning, preparedness, timely and appropriate communication of concerns, abstinence (ETOH, tobacco,  illicit non-prescribed drugs), adherence to all health care team recommendations, support system and caregiver involvement, appropriate and timely resource utilization and follow-through, mental health counseling as needed/recommended, and patient and caregiver responsibilities.  Social Service Handbook, resources and detailed educational information provided and reviewed.  Educational information provided.    Patient and Caregiver also reports current and expected compliance with health care regime and states having a clear understanding of the importance of compliance.      Patient and Caregiver reports a clear understanding that risks and benefits may be involved with organ transplantation and with organ donation.       Patient and Caregiver also reports clear understanding that psychosocial risk factors may affect patient, and include but are not limited to feelings of depression, generalized anxiety, anxiety regarding dependence on others, post traumatic stress disorder, feelings of guilt and other emotional and/or mental concerns, and/or exacerbation of existing mental health concerns.  Detailed resources provided and discussed.      Patient and Caregiver agrees to access appropriate resources in a timely manner as needed and/or as recommended, and to communicate concerns appropriately.  Patient and Caregiver also reports a clear understanding of treatment options available.     Patient and Caregiver received education in a group setting.   reviewed education, provided additional information, and answered questions.    Feelings or Concerns: Pt denies any overwhelming feelings or concerns regarding organ transplant. Pt reports high motivation to pursue organ transplant at this time.    Coping: Pt reports is coping well over all with ESRD and need for organ transplant. Pt reports feels best when relaxing and participating in volunteer work at [employer].  She also enjoys painting.    Goals: Pt  reports hope to have successful organ transplant before needing dialysis.  Patient referred to Vocational Rehabilitation.    Interview Behavior: Patient and Caregiver presents as alert and oriented x 4, pleasant, good eye contact, well groomed, recall good, concentration/judgement good, average intelligence, calm, communicative, cooperative and asking and answering questions appropriately. Pt's partner Any was with pt for pre transplant work up today and was highly supportive of pt throughout the interview.         Transplant Social Work - Candidacy  Assessment/Plan:     Psychosocial Suitability: Patient presents as an acceptable candidate for kidney transplant at this time.   Based on psychosocial risk factors, patient presents as medium risk due to past mental health hospitalization and need for continued mental health support. . Pt reports having transplant caregiver/transportation plan, medical insurance plan and plan to afford transplant costs all in place.    Recommendations/Additional Comments: Recommend fundraising.  May benefit from [employer] [facility] lodging if required to stay locally.  Info provided.      Pt reports understanding [facility] does not assist with any transplant costs such as meals, travel and lodging.     Pt reports usually obtains out pt medicines from [facility]. Pt reports is in agreement to using [facility] for transplant medicines.    Pt reports some caregivers work and may need employer paperwork completed for time missed due to transplant.    Esperanza Charles LCSW

## 2018-07-30 ENCOUNTER — OFFICE VISIT (OUTPATIENT)
Dept: INTERNAL MEDICINE | Facility: CLINIC | Age: 58
End: 2018-07-30
Payer: MEDICARE

## 2018-07-30 VITALS
SYSTOLIC BLOOD PRESSURE: 164 MMHG | BODY MASS INDEX: 36.64 KG/M2 | HEART RATE: 74 BPM | DIASTOLIC BLOOD PRESSURE: 82 MMHG | OXYGEN SATURATION: 97 % | HEIGHT: 66 IN | WEIGHT: 228 LBS | TEMPERATURE: 97 F | RESPIRATION RATE: 18 BRPM

## 2018-07-30 DIAGNOSIS — Z99.2 ESRD ON DIALYSIS: ICD-10-CM

## 2018-07-30 DIAGNOSIS — I10 ESSENTIAL HYPERTENSION: Primary | ICD-10-CM

## 2018-07-30 DIAGNOSIS — K21.9 GASTROESOPHAGEAL REFLUX DISEASE WITHOUT ESOPHAGITIS: ICD-10-CM

## 2018-07-30 DIAGNOSIS — N18.6 ESRD ON DIALYSIS: ICD-10-CM

## 2018-07-30 PROCEDURE — 99214 OFFICE O/P EST MOD 30 MIN: CPT | Mod: ,,, | Performed by: NURSE PRACTITIONER

## 2018-07-30 RX ORDER — LISINOPRIL 20 MG/1
20 TABLET ORAL DAILY
Qty: 90 TABLET | Refills: 1 | Status: SHIPPED | OUTPATIENT
Start: 2018-07-30 | End: 2018-10-09 | Stop reason: ALTCHOICE

## 2018-07-30 NOTE — PATIENT INSTRUCTIONS
Taking ACE Inhibitors     The name of my ACE inhibitor is:        ______________________________      Your healthcare provider has prescribed an ACE (angiotensin-converting enzyme) inhibitor. This medicine opens up your blood vessels and decreases resistance. This allows your blood to flow more easily and makes your heart's work easier. This sheet gives you tips for taking your ACE inhibitor.     Take your medication at the same time each day.     Why might I need an ACE inhibitor?  · It gives you more energy to do the things you enjoy.  · It helps you stay out of the hospital.  · It helps you live longer.  · It strengthens your heart and kidneys.  Tips to help you  · Follow the fact sheet that comes with your medicine. It tells you when and how to take it. Ask for a sheet if you dont get one.  · Have a routine for taking your medicine. Take it at the same time each day. A watch with an alarm can help.  · Take your medicine at least 1 hour before you eat, if you are taking captopril or moexipril, as described in the 's instructions, or 2 hours after you eat. You may take all other ACE inhibitors at any time, according to your healthcare provider's instructions.   · Do not change the dose or stop taking your medicine, unless your provider tells you to. It may take a few weeks for you to feel that the medicine is working.     When should I call my healthcare provider?  · You have diarrhea, nausea, vomiting, or you are sweating. These can cause loss of water (dehydration) and low blood pressure.  · You have a dry, hacking cough or a sore throat.  · You feel dizzy or faint, or have a headache.  · You have a fever or chills, trouble breathing or swallowing, or swelling in your face, mouth, lips, arms, lower legs, ankles, or feet. These may be signs of an allergic reaction.  · You have any other unusual symptoms.  · Tell your healthcare provider if you wish to become pregnant or think you may be pregnant.  ACE inhibitors can cause serious side effects to your unborn child. Your healthcare provider can prescribe medicine to replace your ACE inhibitor that may be safer to take while you are pregnant or trying to become pregnant.   Date Last Reviewed: 6/1/2016  © 2183-9440 [employer]. [address] [place] [id number]. All rights reserved. This information is not intended as a substitute for professional medical care. Always follow your healthcare professional's instructions.

## 2018-07-30 NOTE — PROGRESS NOTES
SUBJECTIVE:      Patient ID: Andra Newell is a 58 y.o. female.    Chief Complaint: Hypertension (4 week recheck)    FU for BP recheck    Discussed outstanding health maintenance - immunizations via [facility] transplant team, [facility] recent exam      Hypertension   This is a chronic problem. The current episode started more than 1 year ago. The problem is unchanged. The problem is uncontrolled. Associated symptoms include headaches (rare). Pertinent negatives include no anxiety, blurred vision, chest pain, malaise/fatigue, neck pain, orthopnea, palpitations, peripheral edema, PND, shortness of breath or sweats. Risk factors for coronary artery disease include diabetes mellitus, dyslipidemia, obesity, post-menopausal state and sedentary lifestyle. Past treatments include ACE inhibitors. The current treatment provides moderate improvement. Compliance problems include diet and exercise.  Hypertensive end-organ damage includes kidney disease. Identifiable causes of hypertension include chronic renal disease and renovascular disease.       Past Surgical History:   Procedure Laterality Date    ACHILLES TENDON SURGERY Left May 2015    BACK SURGERY      CHOLECYSTECTOMY      COLONOSCOPY N/A 9/6/2017    Procedure: COLONOSCOPY;  Surgeon: Marisol Bryson MD;  Location: [facility];  Service: Endoscopy;  Laterality: N/A;    DIALYSIS FISTULA CREATION  12/2017    HEMORRHOID SURGERY      JOINT REPLACEMENT      left    KNEE SURGERY      SHOULDER ARTHROSCOPY      SHOULDER SURGERY       Family History   Problem Relation Age of Onset    Diabetes Mother     Alzheimer's disease Mother     Thyroid disease Mother     Hyperlipidemia Mother     Heart disease Father     Stroke Father     Diabetes Sister     Lupus Sister     Ovarian cancer Sister     Heart disease Brother     No Known Problems Son       Social History     Social History    Marital status: Significant Other     Spouse name: N/A    Number of  children: N/A    Years of education: N/A     Social History Main Topics    Smoking status: Never Smoker    Smokeless tobacco: Never Used    Alcohol use No    Drug use: No    Sexual activity: No     Other Topics Concern    None     Social History Narrative    None     Current Outpatient Prescriptions   Medication Sig Dispense Refill    atorvastatin (LIPITOR) 40 MG tablet Take 1 tablet (40 mg total) by mouth once daily. 90 tablet 1    famotidine (PEPCID) 20 MG tablet Take 1 tablet (20 mg total) by mouth nightly as needed for Heartburn. 30 tablet 5    hydrALAZINE (APRESOLINE) 25 MG tablet Take 1 tablet (25 mg total) by mouth every 8 (eight) hours. 90 tablet 0    insulin glargine (BASAGLAR KWIKPEN U-100 INSULIN) 100 unit/mL (3 mL) InPn pen Inject 90 Units into the skin 2 (two) times daily. 90 units SQ QAM, 90 units SQ QPM 3 Box 5    levothyroxine (SYNTHROID) 75 MCG tablet Take 1 tablet (75 mcg total) by mouth once daily. 30 tablet 5    lisinopril (PRINIVIL,ZESTRIL) 20 MG tablet Take 1 tablet (20 mg total) by mouth once daily. 90 tablet 1    loratadine (CLARITIN) 10 mg tablet Take 1 tablet (10 mg total) by mouth once daily.      LORazepam (ATIVAN) 1 MG tablet Take 1 tablet (1 mg total) by mouth 2 (two) times daily. 60 tablet 2    vilazodone (VIIBRYD) 40 mg Tab tablet Take 1 tablet (40 mg total) by mouth once daily. 30 tablet 5     No current facility-administered medications for this visit.      Review of patient's allergies indicates:   Allergen Reactions    Codeine Itching     Can take oxycodone and hydrocodone with Benadryl      Past Medical History:   Diagnosis Date    Anemia     Anemia in chronic kidney disease, on chronic dialysis 7/12/2017    Arthritis     Asthma     allergic airway    Depression     Diabetes mellitus     Eosinophilia     ESRD on dialysis     Gout     Gout     Hyperlipidemia     Hypertension     Low back pain     MRSA carrier     Obesity     Renal disorder      Rotator cuff syndrome--left     Thyroid disease     Ulnar neuropathy of right upper extremity      Past Surgical History:   Procedure Laterality Date    ACHILLES TENDON SURGERY Left May 2015    BACK SURGERY      CHOLECYSTECTOMY      COLONOSCOPY N/A 9/6/2017    Procedure: COLONOSCOPY;  Surgeon: Marisol Bryson MD;  Location: [facility];  Service: Endoscopy;  Laterality: N/A;    DIALYSIS FISTULA CREATION  12/2017    HEMORRHOID SURGERY      JOINT REPLACEMENT      left    KNEE SURGERY      SHOULDER ARTHROSCOPY      SHOULDER SURGERY         Review of Systems   Constitutional: Negative for activity change, appetite change, fatigue, malaise/fatigue and unexpected weight change.   HENT: Negative for congestion, ear pain, hearing loss, postnasal drip, sinus pain, sinus pressure, sneezing and sore throat.    Eyes: Negative for blurred vision, photophobia and pain.   Respiratory: Negative for cough, chest tightness, shortness of breath and wheezing.    Cardiovascular: Negative for chest pain, palpitations, orthopnea, leg swelling and PND.   Gastrointestinal: Negative for abdominal distention, abdominal pain, blood in stool, constipation, diarrhea, nausea and vomiting.   Endocrine: Negative for cold intolerance, heat intolerance, polydipsia and polyuria.   Genitourinary: Negative for difficulty urinating, dysuria, flank pain, frequency, hematuria, pelvic pain and urgency.        Kidney transplant scheduled for October at [facility]   Musculoskeletal: Negative for arthralgias, back pain, joint swelling, myalgias and neck pain.   Skin: Negative for pallor.   Allergic/Immunologic: Negative for environmental allergies and food allergies.   Neurological: Positive for headaches (rare). Negative for dizziness, weakness, light-headedness and numbness.   Hematological: Does not bruise/bleed easily.   Psychiatric/Behavioral: Negative for agitation, confusion, decreased concentration and sleep disturbance. The patient  "is not nervous/anxious.       OBJECTIVE:      Vitals:    07/30/18 0821 07/30/18 0829 07/30/18 0845   BP: (!) 160/82 (!) 164/80 (!) 164/82   Pulse: 74     Resp: 18     Temp: 97.2 °F (36.2 °C)     SpO2: 97%     Weight: 103.4 kg (228 lb)     Height: 5' 5.5" (1.664 m)       Physical Exam   Constitutional: She is oriented to person, place, and time. Vital signs are normal. She appears well-developed and well-nourished. No distress.   obese   HENT:   Head: Normocephalic and atraumatic.   Right Ear: Hearing normal.   Left Ear: Hearing normal.   Nose: Nose normal. No rhinorrhea.   Mouth/Throat: Mucous membranes are normal.   Eyes: Conjunctivae and lids are normal. Pupils are equal, round, and reactive to light. Right eye exhibits no discharge. Left eye exhibits no discharge. Right conjunctiva is not injected. Left conjunctiva is not injected. Right pupil is round and reactive. Left pupil is round and reactive. Pupils are equal.   Neck: Trachea normal and normal range of motion. Neck supple. No JVD present. No tracheal deviation present. No thyromegaly present.   Cardiovascular: Normal rate, regular rhythm, normal heart sounds and intact distal pulses.  Exam reveals no gallop and no friction rub.    No murmur heard.  Pulses:       Radial pulses are 2+ on the right side, and 2+ on the left side.   Pulmonary/Chest: Effort normal and breath sounds normal. No stridor. No respiratory distress. She has no decreased breath sounds. She has no wheezes. She has no rhonchi. She has no rales.   Abdominal: Soft. Bowel sounds are normal. She exhibits no distension. There is no tenderness. There is no rigidity and no guarding.   Musculoskeletal: Normal range of motion. She exhibits no edema.   Lymphadenopathy:     She has no cervical adenopathy.   Neurological: She is alert and oriented to person, place, and time. She has normal strength. She displays no atrophy. She displays a negative Romberg sign. Coordination and gait normal.   Skin: " Skin is warm and dry. Capillary refill takes less than 2 seconds. No lesion and no rash noted. No cyanosis. No pallor.   AV fistula L upper arm + thrill & bruit   Psychiatric: She has a normal mood and affect. Her speech is normal and behavior is normal. Judgment and thought content normal. Cognition and memory are normal. She is attentive.   Nursing note and vitals reviewed.     Assessment:       1. Essential hypertension    2. ESRD on dialysis    3. Uncontrolled type 2 diabetes mellitus with stage 5 chronic kidney disease not on chronic dialysis, with long-term current use of insulin    4. Gastroesophageal reflux disease without esophagitis        Plan:       Essential hypertension  -     lisinopril (PRINIVIL,ZESTRIL) 20 MG tablet; Take 1 tablet (20 mg total) by mouth once daily.  Dispense: 90 tablet; Refill: 1    ESRD on dialysis   -follows with Florina   -stopping for now per pt - she discussed with Florina   -awaiting transplant surgery 10/2018    Uncontrolled type 2 diabetes mellitus with stage 5 chronic kidney disease not on chronic dialysis, with long-term current use of insulin   - 4/2/2018 8.0    Gastroesophageal reflux disease without esophagitis   -stable on med    Will request records from [facility] to update HM.    Follow-up in about 4 weeks (around 8/27/2018) for BP recheck (nurse visit [facility]).      7/30/2018 REBECCA Buck, FNP-C

## 2018-08-06 ENCOUNTER — CLINICAL SUPPORT (OUTPATIENT)
Dept: INTERNAL MEDICINE | Facility: CLINIC | Age: 58
End: 2018-08-06
Payer: MEDICARE

## 2018-08-06 VITALS — DIASTOLIC BLOOD PRESSURE: 68 MMHG | SYSTOLIC BLOOD PRESSURE: 134 MMHG

## 2018-08-06 DIAGNOSIS — I10 ESSENTIAL HYPERTENSION: Primary | ICD-10-CM

## 2018-08-14 ENCOUNTER — OFFICE VISIT (OUTPATIENT)
Dept: OBSTETRICS AND GYNECOLOGY | Facility: CLINIC | Age: 58
End: 2018-08-14
Payer: MEDICARE

## 2018-08-14 VITALS
DIASTOLIC BLOOD PRESSURE: 87 MMHG | BODY MASS INDEX: 37.21 KG/M2 | SYSTOLIC BLOOD PRESSURE: 175 MMHG | WEIGHT: 227.06 LBS

## 2018-08-14 DIAGNOSIS — Z01.419 WELL WOMAN EXAM: Primary | ICD-10-CM

## 2018-08-14 PROCEDURE — 99499 UNLISTED E&M SERVICE: CPT | Mod: S$PBB,,, | Performed by: OBSTETRICS & GYNECOLOGY

## 2018-08-14 PROCEDURE — 99999 PR PBB SHADOW E&M-EST. PATIENT-LVL III: CPT | Mod: PBBFAC,,, | Performed by: OBSTETRICS & GYNECOLOGY

## 2018-08-14 PROCEDURE — 99213 OFFICE O/P EST LOW 20 MIN: CPT | Mod: PBBFAC,PO | Performed by: OBSTETRICS & GYNECOLOGY

## 2018-08-14 NOTE — PROGRESS NOTES
Subjective:       Patient ID: Andra Newell is a 58 y.o. female.    Chief Complaint:  Well Woman      History of Present Illness  HPI  58 y.o.  referred for annual exam and pap smear. Patient reports no problems today and is informed that pap smear not due till after 18. Patient then elected to reschedule appointment.    GYN & OB History  Patient's last menstrual period was 2012 (exact date).   Date of Last Pap: 2017    OB History    Para Term  AB Living   2 2 2     2   SAB TAB Ectopic Multiple Live Births           2      # Outcome Date GA Lbr Felice/2nd Weight Sex Delivery Anes PTL Lv   2 Term      Vag-Spont   YULISA   1 Term      Vag-Spont   YULISA          Review of Systems  Review of Systems        Objective:    Physical Exam       Assessment:        1. Well woman exam                Plan:      Return in 2 weeks for annual exam and pap smear  Mammogram scheduled for next month

## 2018-08-14 NOTE — LETTER
August 14, 2018      Melissa Cramer, FIDENCIO  [address]  [facility]  1st Floor Clinic  [facility] [address]           [facility] - OB/ GYN  [address] [place] [address] [address]  [facility] [address]  Phone: [phone]          Patient: Andra Newell   MR Number: [id number]   YOB: 1960   Date of Visit: 8/14/2018       Dear Melissa Cramer:    Thank you for referring Andra Newell to me for evaluation. Attached you will find relevant portions of my assessment and plan of care.    If you have questions, please do not hesitate to call me. I look forward to following Andra Newell along with you.    Sincerely,    Tino Salas MD    Enclosure  CC:  No Recipients    If you would like to receive this communication electronically, please contact [email or web address] or [phone] to request more information on [employer] access.    For providers and/or their staff who would like to refer a patient to [facility], please contact us through our one-stop-shop provider referral line, [facility], at [phone].    If you feel you have received this communication in error or would no longer like to receive these types of communications, please e-mail [email or web address]

## 2018-08-16 ENCOUNTER — OFFICE VISIT (OUTPATIENT)
Dept: ENDOCRINOLOGY | Facility: CLINIC | Age: 58
End: 2018-08-16
Payer: MEDICARE

## 2018-08-16 VITALS
SYSTOLIC BLOOD PRESSURE: 140 MMHG | BODY MASS INDEX: 38.16 KG/M2 | WEIGHT: 229.06 LBS | HEART RATE: 88 BPM | DIASTOLIC BLOOD PRESSURE: 72 MMHG | HEIGHT: 65 IN

## 2018-08-16 DIAGNOSIS — Z79.4 TYPE 2 DIABETES MELLITUS WITH HYPERGLYCEMIA, WITH LONG-TERM CURRENT USE OF INSULIN: Primary | ICD-10-CM

## 2018-08-16 DIAGNOSIS — E78.5 HYPERLIPIDEMIA, UNSPECIFIED HYPERLIPIDEMIA TYPE: ICD-10-CM

## 2018-08-16 DIAGNOSIS — E03.9 HYPOTHYROIDISM, UNSPECIFIED TYPE: ICD-10-CM

## 2018-08-16 DIAGNOSIS — E11.65 TYPE 2 DIABETES MELLITUS WITH HYPERGLYCEMIA, WITH LONG-TERM CURRENT USE OF INSULIN: Primary | ICD-10-CM

## 2018-08-16 PROCEDURE — 99214 OFFICE O/P EST MOD 30 MIN: CPT | Mod: S$PBB,NTX,, | Performed by: INTERNAL MEDICINE

## 2018-08-16 PROCEDURE — 99999 PR PBB SHADOW E&M-EST. PATIENT-LVL III: CPT | Mod: PBBFAC,TXP,, | Performed by: INTERNAL MEDICINE

## 2018-08-16 PROCEDURE — 99213 OFFICE O/P EST LOW 20 MIN: CPT | Mod: PBBFAC,NTX | Performed by: INTERNAL MEDICINE

## 2018-08-16 NOTE — PROGRESS NOTES
Ms. Newell is a 58 y.o. F with history of hypothyroidism, Dm2, Hl, HTN, obesity, kidney dysfunction on transplant list here for initial visit for endocrine evaluation for kidney transplant.    Patient is a kidney transplantation candidate. Pt just had fistula creation. Was dialysis for 4 months recently, now off. May have live kidney transplant by Oct 2018.     Regarding hypothyroidism: Has had hypothyroidism in mid 50s, was discovered incidentally on labs.  She remains on levothryoxine 75mcg daily.  No recent TSH.     Regarding her Dm2: has had since her 50s, discovered incidentally on labs, used to be on orals on insulin.   Currently basaglar 92 units twice daily, did not start novolog as previously instructed.  No logs but reports:  High 100s in AM  Late in the day 200s  No hypoglycemia  Per pt recent A1c at dialysis was in the 7's        Assessment and Plan:     Ms. Newell history of hypothyroidism, Dm2, Hl, HTN, obesity, kidney dysfunction on transplant list here for DM and hypothyroidism.    1. Dm2: no objective data, will add on A1c  Continue current regimen for now - pt to bring glucometer; pt seems resistant to use of prandial insulin  Discussed that post transplant insulin requirements will be different    2. HL: LDL acceptable, continue atorvastatin 20mg daily, unclear benefit in dialysis patients but pt tolerating currently without side effects    3. Hypothyroidism: TFTs, continue levothyroxine 75mcg daily for now    RTC Dec 2018 w labs    Component      Latest Ref Rng & Units 7/23/2018 7/19/2018 4/12/2018   Glucose      70 - 110 mg/dL 133 (H)     Sodium      136 - 145 mmol/L 140     Potassium      3.5 - 5.1 mmol/L 5.0     Chloride      95 - 110 mmol/L 114 (H)     CO2      23 - 29 mmol/L 18 (L)     BUN, Bld      6 - 20 mg/dL 45 (H)     Calcium      8.7 - 10.5 mg/dL 9.4     Creatinine      0.5 - 1.4 mg/dL 3.0 (H)     Albumin      3.5 - 5.2 g/dL 3.6     Phosphorus      2.7 - 4.5 mg/dL 4.8 (H)      eGFR if African American      >60 mL/min/1.73 m:2 19 (A)     eGFR if non African American      >60 mL/min/1.73 m:2 16 (A)     Anion Gap      8 - 16 mmol/L 8     TSH      0.400 - 4.000 uIU/mL   0.344 (L)   Free T4      0.71 - 1.51 ng/dL   1.01   PTH      9.0 - 77.0 pg/mL  266.0 (H)        Past Medical History:   Diagnosis Date    Anemia     Anemia in chronic kidney disease, on chronic dialysis 7/12/2017    Arthritis     Asthma     allergic airway    Depression     Diabetes mellitus     Eosinophilia     ESRD on dialysis     Gout     Gout     Hyperlipidemia     Hypertension     Low back pain     MRSA carrier     Obesity     Renal disorder     Rotator cuff syndrome--left     Thyroid disease     Ulnar neuropathy of right upper extremity         reports that  has never smoked. she has never used smokeless tobacco. She reports that she does not drink alcohol or use drugs.    Family History   Problem Relation Age of Onset    Diabetes Mother     Alzheimer's disease Mother     Thyroid disease Mother     Hyperlipidemia Mother     Heart disease Father     Stroke Father     Diabetes Sister     Lupus Sister     Ovarian cancer Sister     Heart disease Brother     No Known Problems Son        Past Surgical History:   Procedure Laterality Date    ACHILLES TENDON SURGERY Left May 2015    BACK SURGERY      CHOLECYSTECTOMY      DIALYSIS FISTULA CREATION  12/2017    HEMORRHOID SURGERY      JOINT REPLACEMENT      left    KNEE SURGERY      SHOULDER ARTHROSCOPY      SHOULDER SURGERY         Patient's Medications   New Prescriptions    No medications on file   Previous Medications    ATORVASTATIN (LIPITOR) 40 MG TABLET    Take 1 tablet (40 mg total) by mouth once daily.    FAMOTIDINE (PEPCID) 20 MG TABLET    Take 1 tablet (20 mg total) by mouth nightly as needed for Heartburn.    HYDRALAZINE (APRESOLINE) 25 MG TABLET    Take 1 tablet (25 mg total) by mouth every 8 (eight) hours.    INSULIN GLARGINE  "(BASAGLALICIA JULIANPEN U-100 INSULIN) 100 UNIT/ML (3 ML) INPN PEN    Inject 90 Units into the skin 2 (two) times daily. 90 units SQ QAM, 90 units SQ QPM    LEVOTHYROXINE (SYNTHROID) 75 MCG TABLET    Take 1 tablet (75 mcg total) by mouth once daily.    LISINOPRIL (PRINIVIL,ZESTRIL) 20 MG TABLET    Take 1 tablet (20 mg total) by mouth once daily.    LORATADINE (CLARITIN) 10 MG TABLET    Take 1 tablet (10 mg total) by mouth once daily.    LORAZEPAM (ATIVAN) 1 MG TABLET    Take 1 tablet (1 mg total) by mouth 2 (two) times daily.    VILAZODONE (VIIBRYD) 40 MG TAB TABLET    Take 1 tablet (40 mg total) by mouth once daily.   Modified Medications    No medications on file   Discontinued Medications    No medications on file         Review of Systems:  General: no fever or chills or malaise   Eyes: no vision changes, no eye pain   ENT: no sore throat, no runny nose   Lung: no sob, no cough   CV: no cp or palpitations   GI: no nausea or vomiting or diarrhea   : no dysuria or hematuria   Endo: no heat or cold intolerance   Heme: no easy bruising or bleeding   MSK: no joint swelling or deformities   Neuro: no confusion or balance problems        BP (!) 140/72   Pulse 88   Ht 5' 5" (1.651 m)   Wt 103.9 kg (229 lb 0.9 oz)   LMP 02/28/2012 (Exact Date)   BMI 38.12 kg/m²     Physical Exam:  General: obese body habitus, not in acute distress   Eyes: anicteric, no proptosis   ENT: no facial lesions, no oral lesions   Neck: no masses, no LAD  Neuro: AOx3, moving all extremities, normal gait   Psych: normal affect, appropriate in conversation    Labs:    Chemistry        Component Value Date/Time     07/23/2018 1007    K 5.0 07/23/2018 1007     (H) 07/23/2018 1007    CO2 18 (L) 07/23/2018 1007    BUN 45 (H) 07/23/2018 1007    CREATININE 3.0 (H) 07/23/2018 1007     (H) 07/23/2018 1007        Component Value Date/Time    CALCIUM 9.4 07/23/2018 1007    ALKPHOS 190 (H) 07/06/2018 0933    AST 19 07/06/2018 0933    ALT " 21 07/06/2018 0933    BILITOT 0.3 07/06/2018 0933    ESTGFRAFRICA 19 (A) 07/23/2018 1007    EGFRNONAA 16 (A) 07/23/2018 1007          Lab Results   Component Value Date    WBC 9.40 07/23/2018    HGB 12.3 07/23/2018    HCT 38.1 07/23/2018    MCV 93 07/23/2018     07/23/2018        Lab Results   Component Value Date    HDL 27 (L) 07/02/2018    HDL 30 (L) 01/29/2018    HDL 30 (L) 10/04/2017     Lab Results   Component Value Date    LDLCALC 70.0 07/02/2018    LDLCALC 94.8 01/29/2018    LDLCALC 87.2 10/04/2017     Lab Results   Component Value Date    TRIG 235 (H) 07/02/2018    TRIG 161 (H) 01/29/2018    TRIG 199 (H) 10/04/2017     Lab Results   Component Value Date    CHOLHDL 18.8 (L) 07/02/2018    CHOLHDL 19.1 (L) 01/29/2018    CHOLHDL 19.1 (L) 10/04/2017       Hemoglobin A1C   Date Value Ref Range Status   04/02/2018 8.0 (H) 4.0 - 5.6 % Final     Comment:     According to ADA guidelines, hemoglobin A1c <7.0% represents  optimal control in non-pregnant diabetic patients. Different  metrics may apply to specific patient populations.   Standards of Medical Care in Diabetes-2016.  For the purpose of screening for the presence of diabetes:  <5.7%     Consistent with the absence of diabetes  5.7-6.4%  Consistent with increasing risk for diabetes   (prediabetes)  >or=6.5%  Consistent with diabetes  Currently, no consensus exists for use of hemoglobin A1c  for diagnosis of diabetes for children.  This Hemoglobin A1c assay has significant interference with fetal   hemoglobin   (HbF). The results are invalid for patients with abnormal amounts of   HbF,   including those with known Hereditary Persistence   of Fetal Hemoglobin. Heterozygous hemoglobin variants (HbAS, HbAC,   HbAD, HbAE, HbA2) do not significantly interfere with this assay;   however, presence of multiple variants in a sample may impact the %   interference.     01/29/2018 8.3 (H) 4.0 - 5.6 % Final     Comment:     According to ADA guidelines, hemoglobin  A1c <7.0% represents  optimal control in non-pregnant diabetic patients. Different  metrics may apply to specific patient populations.   Standards of Medical Care in Diabetes-2016.  For the purpose of screening for the presence of diabetes:  <5.7%     Consistent with the absence of diabetes  5.7-6.4%  Consistent with increasing risk for diabetes   (prediabetes)  >or=6.5%  Consistent with diabetes  Currently, no consensus exists for use of hemoglobin A1c  for diagnosis of diabetes for children.  This Hemoglobin A1c assay has significant interference with fetal   hemoglobin   (HbF). The results are invalid for patients with abnormal amounts of   HbF,   including those with known Hereditary Persistence   of Fetal Hemoglobin. Heterozygous hemoglobin variants (HbAS, HbAC,   HbAD, HbAE, HbA2) do not significantly interfere with this assay;   however, presence of multiple variants in a sample may impact the %   interference.     10/04/2017 8.3 (H) 4.0 - 5.6 % Final     Comment:     According to ADA guidelines, hemoglobin A1c <7.0% represents  optimal control in non-pregnant diabetic patients. Different  metrics may apply to specific patient populations.   Standards of Medical Care in Diabetes-2016.  For the purpose of screening for the presence of diabetes:  <5.7%     Consistent with the absence of diabetes  5.7-6.4%  Consistent with increasing risk for diabetes   (prediabetes)  >or=6.5%  Consistent with diabetes  Currently, no consensus exists for use of hemoglobin A1c  for diagnosis of diabetes for children.  This Hemoglobin A1c assay has significant interference with fetal   hemoglobin   (HbF). The results are invalid for patients with abnormal amounts of   HbF,   including those with known Hereditary Persistence   of Fetal Hemoglobin. Heterozygous hemoglobin variants (HbAS, HbAC,   HbAD, HbAE, HbA2) do not significantly interfere with this assay;   however, presence of multiple variants in a sample may impact the %    interference.         Lab Results   Component Value Date    TSH 0.344 (L) 04/12/2018    T6TYPPW 103 07/26/2017    T5RUKBM 5.1 07/26/2017

## 2018-08-20 ENCOUNTER — TELEPHONE (OUTPATIENT)
Dept: TRANSPLANT | Facility: CLINIC | Age: 58
End: 2018-08-20

## 2018-08-20 NOTE — TELEPHONE ENCOUNTER
"Spoke with RN at [facility], patient has "regained kidney function" and is no longer on dialysis. Labs requested for transplant team review.   "

## 2018-08-22 ENCOUNTER — LAB VISIT (OUTPATIENT)
Dept: LAB | Facility: HOSPITAL | Age: 58
End: 2018-08-22
Attending: INTERNAL MEDICINE
Payer: MEDICARE

## 2018-08-22 DIAGNOSIS — N18.9 ANEMIA OF CHRONIC RENAL FAILURE: ICD-10-CM

## 2018-08-22 DIAGNOSIS — E78.5 HYPERLIPEMIA: ICD-10-CM

## 2018-08-22 DIAGNOSIS — E55.9 AVITAMINOSIS D: ICD-10-CM

## 2018-08-22 DIAGNOSIS — D72.10 EOSINOPHILIA: ICD-10-CM

## 2018-08-22 DIAGNOSIS — N10 ACUTE PYELONEPHRITIS WITHOUT LESION OF RENAL MEDULLARY NECROSIS: Primary | ICD-10-CM

## 2018-08-22 DIAGNOSIS — D63.1 ANEMIA OF CHRONIC RENAL FAILURE: ICD-10-CM

## 2018-08-22 DIAGNOSIS — I51.9 MYXEDEMA HEART DISEASE: ICD-10-CM

## 2018-08-22 DIAGNOSIS — N25.81 SECONDARY HYPERPARATHYROIDISM OF RENAL ORIGIN: ICD-10-CM

## 2018-08-22 DIAGNOSIS — F41.9 ANXIETY HYPERVENTILATION: ICD-10-CM

## 2018-08-22 DIAGNOSIS — N18.4 CHRONIC KIDNEY DISEASE, STAGE IV (SEVERE): ICD-10-CM

## 2018-08-22 DIAGNOSIS — F45.8 ANXIETY HYPERVENTILATION: ICD-10-CM

## 2018-08-22 DIAGNOSIS — N18.6 TYPE 2 DIABETES MELLITUS WITH END-STAGE RENAL DISEASE: ICD-10-CM

## 2018-08-22 DIAGNOSIS — E11.22 TYPE 2 DIABETES MELLITUS WITH END-STAGE RENAL DISEASE: ICD-10-CM

## 2018-08-22 DIAGNOSIS — E03.9 MYXEDEMA HEART DISEASE: ICD-10-CM

## 2018-08-22 DIAGNOSIS — F33.40 RECURRENT MAJOR DEPRESSION IN REMISSION: ICD-10-CM

## 2018-08-22 LAB
ALBUMIN SERPL BCP-MCNC: 3.7 G/DL
ANION GAP SERPL CALC-SCNC: 7 MMOL/L
BACTERIA #/AREA URNS HPF: NORMAL /HPF
BASOPHILS # BLD AUTO: 0 K/UL
BASOPHILS NFR BLD: 0.5 %
BILIRUB UR QL STRIP: NEGATIVE
BUN SERPL-MCNC: 55 MG/DL
CALCIUM SERPL-MCNC: 9.4 MG/DL
CHLORIDE SERPL-SCNC: 114 MMOL/L
CLARITY UR: CLEAR
CO2 SERPL-SCNC: 18 MMOL/L
COLOR UR: YELLOW
CREAT SERPL-MCNC: 3.2 MG/DL
DIFFERENTIAL METHOD: ABNORMAL
EOSINOPHIL # BLD AUTO: 0.4 K/UL
EOSINOPHIL NFR BLD: 4.6 %
ERYTHROCYTE [DISTWIDTH] IN BLOOD BY AUTOMATED COUNT: 14.4 %
EST. GFR  (AFRICAN AMERICAN): 17 ML/MIN/1.73 M^2
EST. GFR  (NON AFRICAN AMERICAN): 15 ML/MIN/1.73 M^2
GLUCOSE SERPL-MCNC: 110 MG/DL
GLUCOSE UR QL STRIP: NEGATIVE
HCT VFR BLD AUTO: 34.2 %
HGB BLD-MCNC: 11.8 G/DL
HGB UR QL STRIP: NEGATIVE
HYALINE CASTS #/AREA URNS LPF: 0 /LPF
KETONES UR QL STRIP: NEGATIVE
LEUKOCYTE ESTERASE UR QL STRIP: NEGATIVE
LYMPHOCYTES # BLD AUTO: 1.8 K/UL
LYMPHOCYTES NFR BLD: 22.5 %
MCH RBC QN AUTO: 30.8 PG
MCHC RBC AUTO-ENTMCNC: 34.4 G/DL
MCV RBC AUTO: 90 FL
MICROSCOPIC COMMENT: NORMAL
MONOCYTES # BLD AUTO: 0.5 K/UL
MONOCYTES NFR BLD: 6.7 %
NEUTROPHILS # BLD AUTO: 5.4 K/UL
NEUTROPHILS NFR BLD: 65.7 %
NITRITE UR QL STRIP: NEGATIVE
PH UR STRIP: 6 [PH] (ref 5–8)
PHOSPHATE SERPL-MCNC: 4.1 MG/DL
PLATELET # BLD AUTO: 196 K/UL
PMV BLD AUTO: 8.1 FL
POTASSIUM SERPL-SCNC: 5.5 MMOL/L
PROT UR QL STRIP: ABNORMAL
PTH-INTACT SERPL-MCNC: 194.2 PG/ML
RBC # BLD AUTO: 3.82 M/UL
RBC #/AREA URNS HPF: 3 /HPF (ref 0–4)
SODIUM SERPL-SCNC: 139 MMOL/L
SP GR UR STRIP: 1.01 (ref 1–1.03)
URN SPEC COLLECT METH UR: ABNORMAL
UROBILINOGEN UR STRIP-ACNC: NEGATIVE EU/DL
WBC # BLD AUTO: 8.1 K/UL
WBC #/AREA URNS HPF: 0 /HPF (ref 0–5)

## 2018-08-22 PROCEDURE — 85025 COMPLETE CBC W/AUTO DIFF WBC: CPT | Mod: TXP

## 2018-08-22 PROCEDURE — 82306 VITAMIN D 25 HYDROXY: CPT | Mod: NTX

## 2018-08-22 PROCEDURE — 80069 RENAL FUNCTION PANEL: CPT | Mod: TXP

## 2018-08-22 PROCEDURE — 36415 COLL VENOUS BLD VENIPUNCTURE: CPT | Mod: TXP

## 2018-08-22 PROCEDURE — 83970 ASSAY OF PARATHORMONE: CPT | Mod: TXP

## 2018-08-22 PROCEDURE — 81000 URINALYSIS NONAUTO W/SCOPE: CPT | Mod: TXP

## 2018-08-23 LAB — 25(OH)D3+25(OH)D2 SERPL-MCNC: 12 NG/ML

## 2018-08-27 ENCOUNTER — TELEPHONE (OUTPATIENT)
Dept: TRANSPLANT | Facility: CLINIC | Age: 58
End: 2018-08-27

## 2018-08-27 ENCOUNTER — OFFICE VISIT (OUTPATIENT)
Dept: FAMILY MEDICINE | Facility: CLINIC | Age: 58
End: 2018-08-27
Payer: MEDICARE

## 2018-08-27 VITALS
RESPIRATION RATE: 16 BRPM | WEIGHT: 269.88 LBS | TEMPERATURE: 98 F | OXYGEN SATURATION: 96 % | HEART RATE: 77 BPM | BODY MASS INDEX: 44.97 KG/M2 | HEIGHT: 65 IN | SYSTOLIC BLOOD PRESSURE: 130 MMHG | DIASTOLIC BLOOD PRESSURE: 76 MMHG

## 2018-08-27 DIAGNOSIS — K21.9 GASTROESOPHAGEAL REFLUX DISEASE WITHOUT ESOPHAGITIS: ICD-10-CM

## 2018-08-27 DIAGNOSIS — E03.9 HYPOTHYROIDISM, UNSPECIFIED TYPE: ICD-10-CM

## 2018-08-27 DIAGNOSIS — R79.89 LOW VITAMIN D LEVEL: ICD-10-CM

## 2018-08-27 DIAGNOSIS — I10 ESSENTIAL HYPERTENSION: Primary | ICD-10-CM

## 2018-08-27 PROCEDURE — 99214 OFFICE O/P EST MOD 30 MIN: CPT | Mod: ,,, | Performed by: NURSE PRACTITIONER

## 2018-08-27 RX ORDER — ACETAMINOPHEN AND PHENYLEPHRINE HCL 325; 5 MG/1; MG/1
1 TABLET ORAL DAILY
COMMUNITY
End: 2019-01-03

## 2018-08-27 RX ORDER — HYDRALAZINE HYDROCHLORIDE 25 MG/1
25 TABLET, FILM COATED ORAL EVERY 12 HOURS
Qty: 60 TABLET | Refills: 5 | Status: SHIPPED | OUTPATIENT
Start: 2018-08-27 | End: 2018-10-09 | Stop reason: ALTCHOICE

## 2018-08-27 RX ORDER — ERGOCALCIFEROL 1.25 MG/1
50000 CAPSULE ORAL
Qty: 12 CAPSULE | Refills: 0 | Status: SHIPPED | OUTPATIENT
Start: 2018-08-27 | End: 2018-09-04

## 2018-08-27 NOTE — PATIENT INSTRUCTIONS
Diabetes: Activity Tips    Being more active can help you manage your diabetes. The tips on this sheet can help you get the most from your exercise. They can also help you stay safe.  Staying Active  Its important for adults to spend less time sitting and being inactive. This is especially true if you have type 2 diabetes. When you are sitting for long periods of time, get up for short sessions of light activity every 30 minutes.  You should aim for at least 150 minutes a week of exercise or physical activity. Dont let more than 2 days go by without being active.  Benefit from briskness  Brisk activity gets your heart beating faster. This can help you increase your fitness, lose extra weight, and manage your blood sugar level. Try brisk walking. Or, if you have foot or leg problems, you can try swimming or bike riding. You can break up your exercise into chunks throughout the day. Work up to at least 30 minutes of steady, brisk exercise on most days.  Warm up and cool down  Warming up and cooling down reduce your risk of injury. They also help limit muscle soreness. Do a mild version of your activity for 5 minutes before and after your routine. You can also learn stretches that will help keep your muscles loose. Your healthcare provider may show you good ways to warm up and stretch.  Do the talk-sing test  The talk-sing test is a simple way to tell how hard youre exercising. If you can talk while exercising, youre in a safe range. If youre out of breath, slow down. If you can carry a tune, its time to  the pace. Walk up a hill. Increase the resistance on your stationary bike. Or swim faster.  What about eating?  You may be told to plan your exercise for 1 to 2 hours after a meal. In most cases, you dont need to eat while being active. If you take insulin or medicine that can cause low blood sugar, test your blood sugar before exercising. And carry a fast-acting sugar that will raise your blood sugar  level quickly. This includes glucose tablets or hard candy. Use it if you feel low blood sugar symptoms.  Safety tips  These tips can help you stay safe as you become fit:  · Exercise with a friend or carry a cell phone if you have one.  · Carry or wear identification, such as a necklace or bracelet, that says you have diabetes.  · Use the proper footwear and safety equipment for your activity.  · Drink water before, during, and after exercise.  · Dress properly for the weather.  · Dont exercise in very hot or very cold weather.  · Dont exercise if you are sick.  · If you are instructed to do so, test your blood sugar before and after you exercise. Have a small carbohydrate snack if your blood sugar is low before you start exercising.   When to stop exercising and call your healthcare provider  Stop exercising and call your healthcare provider right away if you notice any of the following:  · Pain, pressure, tightness, or heaviness in the chest  · Pain or heaviness in the neck, shoulders, back, arms, legs, or feet  · Unusual shortness of breath  · Dizziness or lightheadedness  · Unusually rapid or slow pulse  · Increased joint or muscle pain  · Nausea or vomiting  Date Last Reviewed: 5/1/2016  © 8592-3792 [employer]. [address] [place] [address], [place] [id number]. All rights reserved. This information is not intended as a substitute for professional medical care. Always follow your healthcare professional's instructions.        Eating Heart-Healthy Foods  Eating has a big impact on your heart health. In fact, eating healthier can improve several of your heart risks at once. For instance, it helps you manage weight, cholesterol, and blood pressure. Here are ideas to help you make heart-healthy changes without giving up all the foods and flavors you love.  Getting started  · Talk with your health care provider about eating plans, such as the DASH or Mediterranean diet. You may also be referred to a  dietitian.  · Change a few things at a time. Give yourself time to get used to a few eating changes before adding more.  · Work to create a tasty, healthy eating plan that you can stick to for the rest of your life.    Goals for healthy eating  Below are some tips to improve your eating habits:  · Limit saturated fats and trans fats. Saturated fats raise your levels of cholesterol, so keep these fats to a minimum. They are found in foods such as fatty meats, whole milk, cheese, and palm and coconut oils. Avoid trans fats because they lower good cholesterol as well as raise bad cholesterol. Trans fats are most often found in processed foods.  · Reduce sodium (salt) intake. Eating too much salt may increase your blood pressure. Limit your sodium intake to 2,300 milligrams (mg) per day, or less if your health care provider recommends it. Dining out less often and eating fewer processed foods are two great ways to decrease the amount of salt you consume.  · Managing calories. A calorie is a unit of energy. Your body burns calories for fuel, but if you eat more calories than your body burns, the extras are stored as fat. Your health care provider can help you create a diet plan to manage your calories. This will likely include eating healthier foods as well as exercising regularly. To help you track your progress, keep a diary to record what you eat and how often you exercise.  Choose the right foods  Aim to make these foods staples of your diet. If you have diabetes, you may have different recommendations than what is listed here:  · Fruits and vegetable provide plenty of nutrients without a lot of calories. At meals, fill half your plate with these foods. Split the other half of your plate between whole grains and lean protein.  · Whole grains are high in fiber and rich in vitamins and nutrients. Good choices include whole-wheat bread, pasta, and brown rice.  · Lean proteins give you nutrition with less fat. Good  choices include fish, skinless chicken, and beans.  · Low-fat or nonfat dairy provides nutrients without a lot of fat. Try low-fat or nonfat milk, cheese, or yogurt.  · Healthy fats can be good for you in small amounts. These are unsaturated fats, such as olive oil, nuts, and fish. Try to have at least 2 servings per week of fatty fish such as salmon, sardines, mackerel, rainbow trout, and albacore tuna. These contain omega-3 fatty acids, which are good for your heart. Flaxseed is another source of a heart-healthy fat.  More on heart healthy eating    Read food labels  Healthy eating starts at the grocery store. Be sure to pay attention to food labels on packaged foods. Look for products that are high in fiber and protein, and low in saturated fat, cholesterol, and sodium. Avoid products that contain trans fat. And pay close attention to serving size. For instance, if you plan to eat two servings, double all the numbers on the label.  Prepare food right  A key part of healthy cooking is cutting down on added fat and salt. Look on the internet for lower-fat, lower-sodium recipes. Also, try these tips:  · Remove fat from meat and skin from poultry before cooking.  · Skim fat from the surface of soups and sauces.  · Broil, boil, bake, steam, grill, and microwave food without added fats.  · Choose ingredients that spice up your food without adding calories, fat, or sodium. Try these items: horseradish, hot sauce, lemon, mustard, nonfat salad dressings, and vinegar. For salt-free herbs and spices, try basil, cilantro, cinnamon, pepper, and rosemary.  Date Last Reviewed: 6/25/2015  © 8257-5820 [employer]. [address] [place] [address], [place] [address]. All rights reserved. This information is not intended as a substitute for professional medical care. Always follow your healthcare professional's instructions.

## 2018-08-27 NOTE — TELEPHONE ENCOUNTER
Spoke with pt, stated she is doing well, weight is 226 lbs. She is currently not on dialysis but states her latest GFR from last week was 15. Will obtain the records for review. Stated she is taking insulin as Rx.         ----- Message from Roopa Longo sent at 8/27/2018  2:48 PM CDT -----  Contact: patient       ----- Message -----  From: Sneha Meléndez  Sent: 8/27/2018  12:45 PM  To: [facility] Pre-Kidney Transplant Clinical    Patient have a medical question about her kidney transplant and would like a call today if possible.         Please call [phone]      Thanks!

## 2018-08-27 NOTE — PROGRESS NOTES
SUBJECTIVE:      Patient ID: Andra Newell is a 58 y.o. female.    Chief Complaint: Hypertension (follow up for BP)    FU for BP check - fine today    Reviewed latest labs from [facility] - low vit D (ordered ergocalciferol - told her to discuss with him before picking up script), sees [facility] Friday - surgery still scheduled mid-October, living donor moved to [place] so she will be flying him back for kidney transplant    A1C 7.1 - last reading with dialysis      Hypertension   This is a chronic problem. The current episode started more than 1 year ago. The problem is unchanged. The problem is controlled. Pertinent negatives include no anxiety, blurred vision, chest pain, headaches, malaise/fatigue, neck pain, orthopnea, palpitations, peripheral edema, PND, shortness of breath or sweats. Risk factors for coronary artery disease include sedentary lifestyle, obesity, post-menopausal state, diabetes mellitus, dyslipidemia and family history. Past treatments include diuretics and ACE inhibitors. The current treatment provides moderate improvement. Compliance problems include exercise and diet.  Hypertensive end-organ damage includes kidney disease. Identifiable causes of hypertension include chronic renal disease, hyperparathyroidism, renovascular disease and a thyroid problem.       Past Surgical History:   Procedure Laterality Date    ACHILLES TENDON SURGERY Left May 2015    BACK SURGERY      CHOLECYSTECTOMY      DIALYSIS FISTULA CREATION  12/2017    HEMORRHOID SURGERY      JOINT REPLACEMENT      left    KNEE SURGERY      SHOULDER ARTHROSCOPY      SHOULDER SURGERY       Family History   Problem Relation Age of Onset    Diabetes Mother     Alzheimer's disease Mother     Thyroid disease Mother     Hyperlipidemia Mother     Heart disease Father     Stroke Father     Diabetes Sister     Lupus Sister     Ovarian cancer Sister     Heart disease Brother     No Known Problems Son     Diabetes  Maternal Grandmother     Hypertension Maternal Grandmother     No Known Problems Paternal Grandmother     No Known Problems Paternal Grandfather     COPD Maternal Aunt     Diabetes Maternal Aunt     Heart disease Maternal Aunt     Hypertension Maternal Aunt     No Known Problems Maternal Uncle     No Known Problems Paternal Aunt     No Known Problems Paternal Uncle       Social History     Socioeconomic History    Marital status: Significant Other     Spouse name: None    Number of children: 2    Years of education: None    Highest education level: None   Social Needs    Financial resource strain: None    Food insecurity - worry: None    Food insecurity - inability: None    Transportation needs - medical: None    Transportation needs - non-medical: None   Occupational History    Occupation: retired     Comment:    Tobacco Use    Smoking status: Never Smoker    Smokeless tobacco: Never Used   Substance and Sexual Activity    Alcohol use: No    Drug use: No    Sexual activity: No   Other Topics Concern    None   Social History Narrative    None     Current Outpatient Medications   Medication Sig Dispense Refill    atorvastatin (LIPITOR) 40 MG tablet Take 1 tablet (40 mg total) by mouth once daily. 90 tablet 1    biotin 10,000 mcg Cap Take 1 capsule by mouth once daily. Hair loss      famotidine (PEPCID) 20 MG tablet Take 1 tablet (20 mg total) by mouth nightly as needed for Heartburn. 30 tablet 5    hydrALAZINE (APRESOLINE) 25 MG tablet Take 1 tablet (25 mg total) by mouth every 12 (twelve) hours. 60 tablet 5    insulin glargine (BASAGLAR KWIKPEN U-100 INSULIN) 100 unit/mL (3 mL) InPn pen Inject 90 Units into the skin 2 (two) times daily. 90 units SQ QAM, 90 units SQ QPM 3 Box 5    levothyroxine (SYNTHROID) 75 MCG tablet Take 1 tablet (75 mcg total) by mouth once daily. 30 tablet 5    lisinopril (PRINIVIL,ZESTRIL) 20 MG tablet Take 1 tablet (20 mg total) by mouth  once daily. (Patient taking differently: Take 40 mg by mouth once daily. ) 90 tablet 1    loratadine (CLARITIN) 10 mg tablet Take 1 tablet (10 mg total) by mouth once daily.      LORazepam (ATIVAN) 1 MG tablet Take 1 tablet (1 mg total) by mouth 2 (two) times daily. 60 tablet 2    vilazodone (VIIBRYD) 40 mg Tab tablet Take 1 tablet (40 mg total) by mouth once daily. 30 tablet 5    ergocalciferol (ERGOCALCIFEROL) 50,000 unit Cap Take 1 capsule (50,000 Units total) by mouth every 7 days. 12 capsule 0     No current facility-administered medications for this visit.      Review of patient's allergies indicates:   Allergen Reactions    Codeine Itching     Can take oxycodone and hydrocodone with Benadryl      Past Medical History:   Diagnosis Date    Anemia     Anemia in chronic kidney disease, on chronic dialysis 7/12/2017    Arthritis     Asthma     allergic airway    Depression     Diabetes mellitus     Eosinophilia     ESRD (end stage renal disease)     stage V, due for living donor 9/2018    ESRD on dialysis     Gout     Gout     Hyperlipidemia     Hypertension     Low back pain     MRSA carrier     Obesity     Renal disorder     Rotator cuff syndrome--left     Thyroid disease     Ulnar neuropathy of right upper extremity      Past Surgical History:   Procedure Laterality Date    ACHILLES TENDON SURGERY Left May 2015    BACK SURGERY      CHOLECYSTECTOMY      DIALYSIS FISTULA CREATION  12/2017    HEMORRHOID SURGERY      JOINT REPLACEMENT      left    KNEE SURGERY      SHOULDER ARTHROSCOPY      SHOULDER SURGERY         Review of Systems   Constitutional: Negative for activity change, appetite change, fatigue, malaise/fatigue and unexpected weight change.   HENT: Negative for congestion, ear pain, hearing loss, postnasal drip, sinus pressure, sinus pain, sneezing and sore throat.    Eyes: Negative for blurred vision, photophobia and pain.   Respiratory: Negative for cough, chest  "tightness, shortness of breath and wheezing.    Cardiovascular: Negative for chest pain, palpitations, orthopnea, leg swelling and PND.   Gastrointestinal: Negative for abdominal distention, abdominal pain, blood in stool, constipation, diarrhea, nausea and vomiting.   Endocrine: Negative for cold intolerance, heat intolerance, polydipsia and polyuria.   Genitourinary: Negative for difficulty urinating, dysuria, flank pain, frequency, hematuria, pelvic pain and urgency.   Musculoskeletal: Positive for arthralgias. Negative for back pain, joint swelling, myalgias and neck pain.   Skin: Negative for pallor.   Allergic/Immunologic: Negative for environmental allergies and food allergies.   Neurological: Negative for dizziness, weakness, light-headedness, numbness and headaches.   Hematological: Does not bruise/bleed easily.   Psychiatric/Behavioral: Negative for agitation, confusion, decreased concentration and sleep disturbance. The patient is not nervous/anxious.       OBJECTIVE:      Vitals:    08/27/18 0834   BP: 130/76   Pulse: 77   Resp: 16   Temp: 98.1 °F (36.7 °C)   TempSrc: Oral   SpO2: 96%   Weight: 122.4 kg (269 lb 14.4 oz)   Height: 5' 5" (1.651 m)     Physical Exam   Constitutional: She is oriented to person, place, and time. Vital signs are normal. She appears well-developed and well-nourished. No distress.   HENT:   Head: Normocephalic and atraumatic.   Right Ear: Hearing normal.   Left Ear: Hearing normal.   Nose: Nose normal. No rhinorrhea.   Mouth/Throat: Mucous membranes are normal.   Eyes: Conjunctivae and lids are normal. Pupils are equal, round, and reactive to light. Right eye exhibits no discharge. Left eye exhibits no discharge. Right conjunctiva is not injected. Left conjunctiva is not injected. Right pupil is round and reactive. Left pupil is round and reactive. Pupils are equal.   Neck: Trachea normal and normal range of motion. Neck supple. No JVD present. No tracheal deviation present. No " thyromegaly present.   Cardiovascular: Normal rate, regular rhythm, normal heart sounds and intact distal pulses. Exam reveals no gallop and no friction rub.   No murmur heard.  Pulses:       Radial pulses are 2+ on the right side, and 2+ on the left side.   Pulmonary/Chest: Effort normal and breath sounds normal. No stridor. No respiratory distress. She has no decreased breath sounds. She has no wheezes. She has no rhonchi. She has no rales.   Abdominal: Soft. Bowel sounds are normal. She exhibits no distension. There is no tenderness. There is no rigidity and no guarding.   Musculoskeletal: Normal range of motion. She exhibits no edema.   Lymphadenopathy:     She has no cervical adenopathy.   Neurological: She is alert and oriented to person, place, and time. She has normal strength. She displays no atrophy. She displays a negative Romberg sign. Coordination and gait normal.   Skin: Skin is warm and dry. Capillary refill takes less than 2 seconds. No lesion and no rash noted. No cyanosis. No pallor.   Psychiatric: She has a normal mood and affect. Her speech is normal and behavior is normal. Judgment and thought content normal. Cognition and memory are normal. She is attentive.   Nursing note and vitals reviewed.     Assessment:       1. Essential hypertension    2. Low vitamin D level    3. Uncontrolled type 2 diabetes mellitus with stage 5 chronic kidney disease not on chronic dialysis, with long-term current use of insulin    4. Gastroesophageal reflux disease without esophagitis    5. Hypothyroidism, unspecified type        Plan:       Essential hypertension  -     hydrALAZINE (APRESOLINE) 25 MG tablet; Take 1 tablet (25 mg total) by mouth every 12 (twelve) hours.  Dispense: 60 tablet; Refill: 5    Low vitamin D level  -     ergocalciferol (ERGOCALCIFEROL) 50,000 unit Cap; Take 1 capsule (50,000 Units total) by mouth every 7 days.  Dispense: 12 capsule; Refill: 0    Uncontrolled type 2 diabetes mellitus with  stage 5 chronic kidney disease not on chronic dialysis, with long-term current use of insulin   -stable   -follows with [facility] for ESRD    Gastroesophageal reflux disease without esophagitis   -stable    Hypothyroidism, unspecified type   -stable    Follow-up in about 6 months (around 2/27/2019) for routine BS check.      8/27/2018 REBECCA Buck, FNP-C

## 2018-08-28 ENCOUNTER — TELEPHONE (OUTPATIENT)
Dept: TRANSPLANT | Facility: CLINIC | Age: 58
End: 2018-08-28

## 2018-08-28 NOTE — TELEPHONE ENCOUNTER
Spoke with Hope from nephrology office, she will fax labs from last week for transplant team review

## 2018-09-04 ENCOUNTER — OFFICE VISIT (OUTPATIENT)
Dept: OBSTETRICS AND GYNECOLOGY | Facility: CLINIC | Age: 58
End: 2018-09-04
Payer: MEDICARE

## 2018-09-04 VITALS
HEIGHT: 67 IN | DIASTOLIC BLOOD PRESSURE: 74 MMHG | WEIGHT: 226.63 LBS | SYSTOLIC BLOOD PRESSURE: 127 MMHG | BODY MASS INDEX: 35.57 KG/M2

## 2018-09-04 DIAGNOSIS — Z01.419 WELL WOMAN EXAM: ICD-10-CM

## 2018-09-04 DIAGNOSIS — Z01.419 GYNECOLOGIC EXAM NORMAL: Primary | ICD-10-CM

## 2018-09-04 LAB — HUMAN PAPILLOMAVIRUS (HPV): NORMAL

## 2018-09-04 PROCEDURE — G0101 CA SCREEN;PELVIC/BREAST EXAM: HCPCS | Mod: PBBFAC,PO | Performed by: OBSTETRICS & GYNECOLOGY

## 2018-09-04 PROCEDURE — 87624 HPV HI-RISK TYP POOLED RSLT: CPT

## 2018-09-04 PROCEDURE — 99213 OFFICE O/P EST LOW 20 MIN: CPT | Mod: PBBFAC,PO | Performed by: OBSTETRICS & GYNECOLOGY

## 2018-09-04 PROCEDURE — 99999 PR PBB SHADOW E&M-EST. PATIENT-LVL III: CPT | Mod: PBBFAC,,, | Performed by: OBSTETRICS & GYNECOLOGY

## 2018-09-04 PROCEDURE — 88175 CYTOPATH C/V AUTO FLUID REDO: CPT

## 2018-09-04 PROCEDURE — G0101 CA SCREEN;PELVIC/BREAST EXAM: HCPCS | Mod: S$PBB,,, | Performed by: OBSTETRICS & GYNECOLOGY

## 2018-09-04 RX ORDER — TORSEMIDE 20 MG/1
20 TABLET ORAL EVERY MORNING
COMMUNITY
End: 2018-10-16

## 2018-09-04 RX ORDER — CALCITRIOL 0.25 UG/1
0.25 CAPSULE ORAL EVERY MORNING
Status: ON HOLD | COMMUNITY
End: 2019-01-17 | Stop reason: HOSPADM

## 2018-09-04 NOTE — PROGRESS NOTES
Subjective:       Patient ID: Andra Newell is a 58 y.o. female.    Chief Complaint:  Establish Care; Well Woman; and needs pap to remain on transplant list      History of Present Illness  HPI  Annual Exam-Postmenopausal  Patient presents for annual exam. The patient has no complaints today. The patient is sexually active. GYN screening history: last pap: was normal and last mammogram: was normal. The patient is not taking hormone replacement therapy. Patient denies post-menopausal vaginal bleeding. The patient wears seatbelts: yes. The patient participates in regular exercise: not asked. Has the patient ever been transfused or tattooed?: no. The patient reports that there is not domestic violence in her life.    GYN & OB History  Patient's last menstrual period was 2012 (exact date).   Date of Last Pap: 2017    OB History    Para Term  AB Living   2 2 2     2   SAB TAB Ectopic Multiple Live Births           2      # Outcome Date GA Lbr Felice/2nd Weight Sex Delivery Anes PTL Lv   2 Term      Vag-Spont   YULISA   1 Term      Vag-Spont   YULISA          Review of Systems  Review of Systems   Constitutional: Negative for activity change, appetite change, chills, diaphoresis, fatigue, fever and unexpected weight change.   HENT: Negative for mouth sores and tinnitus.    Eyes: Negative for discharge and visual disturbance.   Respiratory: Negative for cough, shortness of breath and wheezing.    Cardiovascular: Negative for chest pain, palpitations and leg swelling.   Gastrointestinal: Negative for abdominal pain, bloating, blood in stool, constipation, diarrhea, nausea and vomiting.   Endocrine: Positive for diabetes. Negative for hair loss, hot flashes, hyperthyroidism and hypothyroidism.   Genitourinary: Negative for decreased libido, dyspareunia, dysuria, flank pain, frequency, genital sores, hematuria, menorrhagia, menstrual problem, pelvic pain, urgency, vaginal bleeding, vaginal discharge,  vaginal pain, dysmenorrhea, urinary incontinence, postcoital bleeding, postmenopausal bleeding and vaginal odor.   Musculoskeletal: Negative for back pain, joint swelling and myalgias.   Skin:  Negative for rash, no acne and hair changes.   Neurological: Negative for seizures, syncope, numbness and headaches.   Hematological: Negative for adenopathy. Does not bruise/bleed easily.   Psychiatric/Behavioral: Positive for depression. Negative for sleep disturbance. The patient is not nervous/anxious.    Breast: Negative for breast mass, breast pain, nipple discharge and skin changes          Objective:    Physical Exam:   Constitutional: She is oriented to person, place, and time. She appears well-developed and well-nourished. No distress.    HENT:   Head: Normocephalic.    Eyes: Pupils are equal, round, and reactive to light.    Neck: Normal range of motion.    Cardiovascular: Normal rate.     Pulmonary/Chest: Effort normal.   Breasts: no palpable masses or nipple discharge        Abdominal: Soft. She exhibits no distension. There is no tenderness.     Genitourinary: Uterus normal. No vaginal discharge found.   Genitourinary Comments: Pap smear done of cervix. Cervix is slightly friable and stenotic. Grade 2 rectocele noted.               Neurological: She is alert and oriented to person, place, and time.    Skin: Skin is warm and dry.    Psychiatric: She has a normal mood and affect. Her behavior is normal. Judgment and thought content normal.          Assessment:        1. Gynecologic exam normal    2. Well woman exam                Plan:      Mammogram and annual exams

## 2018-09-06 ENCOUNTER — TELEPHONE (OUTPATIENT)
Dept: TRANSPLANT | Facility: CLINIC | Age: 58
End: 2018-09-06

## 2018-09-06 NOTE — TELEPHONE ENCOUNTER
----- Message from Gina Wright sent at 9/6/2018  8:33 AM CDT -----  Regarding: RE: verify insurance  Morning,     Patient has Medicare A/B/D and [place] Medicaid - no scheduled end date noted.       ----- Message -----  From: Yuly Doll RN  Sent: 9/5/2018  12:08 PM  To: Gina Wright, Conrad Shea RN  Subject: verify insurance                                 Minor Fuentes,  Can you please verify Andra's coverage for transplant, and pose it as a routine check? She is ready to schedule her live donor transplant, but has stated that her donor can only do the surgery in October. According to her donor, her insurance may lapse in 2019. So there is some discrepancy.  Please let us know ASAP.  Thank you! LAM

## 2018-09-07 LAB
HPV HR 12 DNA CVX QL NAA+PROBE: NEGATIVE
HPV16 AG SPEC QL: NEGATIVE
HPV18 DNA SPEC QL NAA+PROBE: NEGATIVE

## 2018-09-10 ENCOUNTER — HOSPITAL ENCOUNTER (OUTPATIENT)
Dept: RADIOLOGY | Facility: CLINIC | Age: 58
Discharge: HOME | End: 2018-09-10
Attending: INTERNAL MEDICINE
Payer: MEDICARE

## 2018-09-10 ENCOUNTER — TELEPHONE (OUTPATIENT)
Dept: TRANSPLANT | Facility: CLINIC | Age: 58
End: 2018-09-10

## 2018-09-10 DIAGNOSIS — Z76.82 ORGAN TRANSPLANT CANDIDATE: Primary | ICD-10-CM

## 2018-09-10 DIAGNOSIS — Z12.31 ENCOUNTER FOR SCREENING MAMMOGRAM FOR MALIGNANT NEOPLASM OF BREAST: ICD-10-CM

## 2018-09-10 DIAGNOSIS — Z76.82 ORGAN TRANSPLANT CANDIDATE: ICD-10-CM

## 2018-09-10 PROCEDURE — 77067 SCR MAMMO BI INCL CAD: CPT | Mod: 26,TXP,, | Performed by: RADIOLOGY

## 2018-09-10 PROCEDURE — 77063 BREAST TOMOSYNTHESIS BI: CPT | Mod: 26,TXP,, | Performed by: RADIOLOGY

## 2018-09-10 PROCEDURE — 77063 BREAST TOMOSYNTHESIS BI: CPT | Mod: TC,PO,TXP

## 2018-09-10 NOTE — TELEPHONE ENCOUNTER
Confirmed surgery for 10/15/18 and pre-op on 10/9/18.  Denies any changes in medical history, blood transfusions, or hospitalizations.  Denies any blood thinners or hormone use.  Discussed possibility of cancellation due to a busy service.  Denies need for disability forms.  All questions answered and verbalized understanding.

## 2018-09-11 ENCOUNTER — TELEPHONE (OUTPATIENT)
Dept: TRANSPLANT | Facility: CLINIC | Age: 58
End: 2018-09-11

## 2018-09-11 NOTE — TELEPHONE ENCOUNTER
Renal Transplant Attending Recipient Chart Review Note:    58 y.o. female with history of ESRD secondary to DM/HTN +/- antibiotic induced nephrotoxicity who has been on HD since 4/13/18 but was able to come off dialysis - will need to determine when she was taken off (endocrinology note dated 8/16/18 stated she was off); last seen in listed RR clinic on 7/19/18. We last reviewed this patient on 5/22/18 and this review occurred on 8/28/18.     See prior listed review meeting note and this provider's listed RR clinic visit note for details of prior ED visits/hospitalizations.     She was started on lisinopril on 7/30/18 which could account for hyperkalemia. Will need to advise her to follow a olw potassium diet.     Labs (8/22/18) --> Hb 11.8; WBC 8.1, platelet count 196; .2; albumin 3.7; potassium 5.5; phos 4.1; BUN 55; Cr 3.2; eGFR 15    She is scheduled for updated mammogram and gyn appointment on 9/10/18     Anahy Weiss MD  Renal Transplant Attending    ----- Message from Conrad Shea RN sent at 8/29/2018  2:00 PM CDT -----  Per review on 8/28/8:    58 year old white female with CKD due to HTN, DM, antibiotic toxicity, obesity,  Pt is no longer on HD (current GFR 15 ml/min)    1.  Last seen in Listed RR on 7/19/18  2.  Uses power scooter due to knee pain per patient  3.  Pt with uncontrolled HTN- was started on Lisinopril on 7/30/18  4.  Current BMI is 37  5.  Labs from 8/22/18  K+ 5.5 and albumin 3.7, WBC 8.1, H/H 11/34, Plt 196,   6.  Mammo/GYN scheduled for 9/10/18

## 2018-09-12 DIAGNOSIS — Z91.09 ENVIRONMENTAL ALLERGIES: ICD-10-CM

## 2018-09-13 ENCOUNTER — LAB VISIT (OUTPATIENT)
Dept: LAB | Facility: HOSPITAL | Age: 58
End: 2018-09-13
Attending: INTERNAL MEDICINE
Payer: MEDICARE

## 2018-09-13 DIAGNOSIS — I10 ESSENTIAL HYPERTENSION, MALIGNANT: Primary | ICD-10-CM

## 2018-09-13 DIAGNOSIS — N18.4 CHRONIC KIDNEY DISEASE, STAGE IV (SEVERE): ICD-10-CM

## 2018-09-13 LAB
ALBUMIN SERPL BCP-MCNC: 3.7 G/DL
ANION GAP SERPL CALC-SCNC: 12 MMOL/L
BUN SERPL-MCNC: 96 MG/DL
CALCIUM SERPL-MCNC: 9.7 MG/DL
CHLORIDE SERPL-SCNC: 111 MMOL/L
CO2 SERPL-SCNC: 17 MMOL/L
CREAT SERPL-MCNC: 3.7 MG/DL
EST. GFR  (AFRICAN AMERICAN): 15 ML/MIN/1.73 M^2
EST. GFR  (NON AFRICAN AMERICAN): 13 ML/MIN/1.73 M^2
GLUCOSE SERPL-MCNC: 123 MG/DL
PHOSPHATE SERPL-MCNC: 5.3 MG/DL
POTASSIUM SERPL-SCNC: 4.8 MMOL/L
SODIUM SERPL-SCNC: 140 MMOL/L

## 2018-09-13 PROCEDURE — 80069 RENAL FUNCTION PANEL: CPT | Mod: NTX

## 2018-09-13 PROCEDURE — 36415 COLL VENOUS BLD VENIPUNCTURE: CPT | Mod: NTX

## 2018-09-17 ENCOUNTER — TELEPHONE (OUTPATIENT)
Dept: FAMILY MEDICINE | Facility: CLINIC | Age: 58
End: 2018-09-17

## 2018-09-17 RX ORDER — PEN NEEDLE, DIABETIC 30 GX3/16"
1 NEEDLE, DISPOSABLE MISCELLANEOUS DAILY
Qty: 100 EACH | Refills: 1 | Status: SHIPPED | OUTPATIENT
Start: 2018-09-17 | End: 2018-12-16

## 2018-09-17 RX ORDER — LORATADINE 10 MG/1
10 TABLET ORAL DAILY
COMMUNITY
Start: 2018-09-17 | End: 2018-09-18 | Stop reason: SDUPTHER

## 2018-09-17 NOTE — TELEPHONE ENCOUNTER
Med refill    Patient left a message that she is taking 2 mg now. Please advise and send to aliya watson

## 2018-09-18 ENCOUNTER — TELEPHONE (OUTPATIENT)
Dept: PREADMISSION TESTING | Facility: HOSPITAL | Age: 58
End: 2018-09-18

## 2018-09-18 DIAGNOSIS — Z91.09 ENVIRONMENTAL ALLERGIES: ICD-10-CM

## 2018-09-18 RX ORDER — LORATADINE 10 MG/1
10 TABLET ORAL DAILY
Qty: 90 TABLET | Refills: 1 | Status: SHIPPED | OUTPATIENT
Start: 2018-09-18 | End: 2019-01-02 | Stop reason: SDUPTHER

## 2018-09-18 NOTE — TELEPHONE ENCOUNTER
----- Message from Era Aguirre sent at 9/18/2018  2:15 PM CDT -----  Regarding: Need Pre Op appt  Please schedule appt on Simona Pollard's schedule on 10/09/18 at 03:00 PM.Surg Date: 10/15/18-dr Ennis.    Thx: Era

## 2018-10-09 ENCOUNTER — CLINICAL SUPPORT (OUTPATIENT)
Dept: TRANSPLANT | Facility: CLINIC | Age: 58
End: 2018-10-09
Payer: MEDICARE

## 2018-10-09 ENCOUNTER — HOSPITAL ENCOUNTER (OUTPATIENT)
Dept: CARDIOLOGY | Facility: CLINIC | Age: 58
Discharge: HOME | End: 2018-10-09
Payer: MEDICARE

## 2018-10-09 ENCOUNTER — HOSPITAL ENCOUNTER (OUTPATIENT)
Dept: RADIOLOGY | Facility: HOSPITAL | Age: 58
Discharge: HOME | End: 2018-10-09
Attending: INTERNAL MEDICINE
Payer: MEDICARE

## 2018-10-09 ENCOUNTER — OFFICE VISIT (OUTPATIENT)
Dept: TRANSPLANT | Facility: CLINIC | Age: 58
End: 2018-10-09
Payer: MEDICARE

## 2018-10-09 ENCOUNTER — ANESTHESIA EVENT (OUTPATIENT)
Dept: SURGERY | Facility: HOSPITAL | Age: 58
DRG: 652 | End: 2018-10-09
Payer: MEDICARE

## 2018-10-09 ENCOUNTER — HOSPITAL ENCOUNTER (OUTPATIENT)
Dept: PREADMISSION TESTING | Facility: HOSPITAL | Age: 58
Discharge: HOME | End: 2018-10-09
Attending: INTERNAL MEDICINE
Payer: MEDICARE

## 2018-10-09 VITALS
SYSTOLIC BLOOD PRESSURE: 158 MMHG | SYSTOLIC BLOOD PRESSURE: 158 MMHG | BODY MASS INDEX: 37.61 KG/M2 | TEMPERATURE: 98 F | RESPIRATION RATE: 17 BRPM | OXYGEN SATURATION: 98 % | HEART RATE: 89 BPM | HEIGHT: 65 IN | WEIGHT: 225.75 LBS | BODY MASS INDEX: 37.61 KG/M2 | DIASTOLIC BLOOD PRESSURE: 89 MMHG | TEMPERATURE: 98 F | HEIGHT: 65 IN | RESPIRATION RATE: 17 BRPM | HEART RATE: 89 BPM | DIASTOLIC BLOOD PRESSURE: 89 MMHG | OXYGEN SATURATION: 98 % | WEIGHT: 225.75 LBS

## 2018-10-09 VITALS
OXYGEN SATURATION: 98 % | WEIGHT: 225.75 LBS | OXYGEN SATURATION: 98 % | HEART RATE: 87 BPM | TEMPERATURE: 98 F | DIASTOLIC BLOOD PRESSURE: 56 MMHG | HEIGHT: 65 IN | SYSTOLIC BLOOD PRESSURE: 109 MMHG | SYSTOLIC BLOOD PRESSURE: 109 MMHG | WEIGHT: 225.75 LBS | RESPIRATION RATE: 17 BRPM | HEIGHT: 65 IN | BODY MASS INDEX: 37.61 KG/M2 | HEART RATE: 87 BPM | BODY MASS INDEX: 37.61 KG/M2 | RESPIRATION RATE: 17 BRPM | DIASTOLIC BLOOD PRESSURE: 56 MMHG | TEMPERATURE: 98 F

## 2018-10-09 VITALS
BODY MASS INDEX: 35.79 KG/M2 | WEIGHT: 228 LBS | SYSTOLIC BLOOD PRESSURE: 130 MMHG | HEART RATE: 91 BPM | HEIGHT: 67 IN | DIASTOLIC BLOOD PRESSURE: 69 MMHG | RESPIRATION RATE: 16 BRPM | OXYGEN SATURATION: 100 % | TEMPERATURE: 99 F

## 2018-10-09 DIAGNOSIS — Z76.82 ORGAN TRANSPLANT CANDIDATE: ICD-10-CM

## 2018-10-09 DIAGNOSIS — Z99.2 ANEMIA IN CHRONIC KIDNEY DISEASE, ON CHRONIC DIALYSIS: ICD-10-CM

## 2018-10-09 DIAGNOSIS — Z99.2 ESRD ON DIALYSIS: Primary | ICD-10-CM

## 2018-10-09 DIAGNOSIS — R19.7 DIARRHEA, UNSPECIFIED TYPE: Primary | ICD-10-CM

## 2018-10-09 DIAGNOSIS — B78.0 INTESTINAL STRONGYLOIDIASIS: ICD-10-CM

## 2018-10-09 DIAGNOSIS — N18.6 ESRD ON DIALYSIS: Primary | ICD-10-CM

## 2018-10-09 DIAGNOSIS — M19.90 ARTHRITIS: Chronic | ICD-10-CM

## 2018-10-09 DIAGNOSIS — E03.9 HYPOTHYROIDISM, UNSPECIFIED TYPE: ICD-10-CM

## 2018-10-09 DIAGNOSIS — Z76.82 ORGAN TRANSPLANT CANDIDATE: Primary | ICD-10-CM

## 2018-10-09 DIAGNOSIS — N18.6 ANEMIA IN CHRONIC KIDNEY DISEASE, ON CHRONIC DIALYSIS: ICD-10-CM

## 2018-10-09 DIAGNOSIS — E78.5 HYPERLIPIDEMIA, UNSPECIFIED HYPERLIPIDEMIA TYPE: Chronic | ICD-10-CM

## 2018-10-09 DIAGNOSIS — D63.1 ANEMIA IN CHRONIC KIDNEY DISEASE, ON CHRONIC DIALYSIS: ICD-10-CM

## 2018-10-09 DIAGNOSIS — Z01.818 PRE-OPERATIVE CLEARANCE: ICD-10-CM

## 2018-10-09 PROCEDURE — 99499 UNLISTED E&M SERVICE: CPT | Mod: S$PBB,TXP,, | Performed by: TRANSPLANT SURGERY

## 2018-10-09 PROCEDURE — 71046 X-RAY EXAM CHEST 2 VIEWS: CPT | Mod: 26,TXP,, | Performed by: RADIOLOGY

## 2018-10-09 PROCEDURE — 99999 PR PBB SHADOW E&M-EST. PATIENT-LVL III: CPT | Mod: PBBFAC,TXP,,

## 2018-10-09 PROCEDURE — 99213 OFFICE O/P EST LOW 20 MIN: CPT | Mod: PBBFAC,TXP,25

## 2018-10-09 PROCEDURE — 93005 ELECTROCARDIOGRAM TRACING: CPT | Mod: PBBFAC,TXP | Performed by: INTERNAL MEDICINE

## 2018-10-09 PROCEDURE — 99213 OFFICE O/P EST LOW 20 MIN: CPT | Mod: PBBFAC,27,TXP | Performed by: TRANSPLANT SURGERY

## 2018-10-09 PROCEDURE — 99214 OFFICE O/P EST MOD 30 MIN: CPT | Mod: PBBFAC,25,27,TXP | Performed by: INTERNAL MEDICINE

## 2018-10-09 PROCEDURE — 99999 PR PBB SHADOW E&M-EST. PATIENT-LVL IV: CPT | Mod: PBBFAC,TXP,, | Performed by: INTERNAL MEDICINE

## 2018-10-09 PROCEDURE — 71046 X-RAY EXAM CHEST 2 VIEWS: CPT | Mod: TC,FY,TXP

## 2018-10-09 PROCEDURE — 99213 OFFICE O/P EST LOW 20 MIN: CPT | Mod: PBBFAC,27,TXP,25

## 2018-10-09 PROCEDURE — 99215 OFFICE O/P EST HI 40 MIN: CPT | Mod: S$PBB,TXP,, | Performed by: INTERNAL MEDICINE

## 2018-10-09 PROCEDURE — 99999 PR PBB SHADOW E&M-EST. PATIENT-LVL III: CPT | Mod: PBBFAC,TXP,, | Performed by: TRANSPLANT SURGERY

## 2018-10-09 PROCEDURE — 93010 ELECTROCARDIOGRAM REPORT: CPT | Mod: S$PBB,TXP,, | Performed by: INTERNAL MEDICINE

## 2018-10-09 RX ORDER — IBUPROFEN 200 MG
16 TABLET ORAL
Status: CANCELLED | OUTPATIENT
Start: 2018-10-09

## 2018-10-09 RX ORDER — INSULIN GLARGINE 100 [IU]/ML
90 INJECTION, SOLUTION SUBCUTANEOUS 2 TIMES DAILY
Status: ON HOLD | COMMUNITY
End: 2019-01-18 | Stop reason: SDUPTHER

## 2018-10-09 RX ORDER — MYCOPHENOLATE MOFETIL 250 MG/1
500 CAPSULE ORAL 2 TIMES DAILY
Status: CANCELLED | OUTPATIENT
Start: 2018-10-09

## 2018-10-09 RX ORDER — HEPARIN SODIUM 5000 [USP'U]/ML
5000 INJECTION, SOLUTION INTRAVENOUS; SUBCUTANEOUS
Status: CANCELLED | OUTPATIENT
Start: 2018-10-09

## 2018-10-09 RX ORDER — ONDANSETRON 2 MG/ML
4 INJECTION INTRAMUSCULAR; INTRAVENOUS EVERY 8 HOURS PRN
Status: CANCELLED | OUTPATIENT
Start: 2018-10-09 | End: 2018-10-10

## 2018-10-09 RX ORDER — HYDRALAZINE HYDROCHLORIDE 25 MG/1
25 TABLET, FILM COATED ORAL EVERY MORNING
Status: ON HOLD | COMMUNITY
End: 2019-01-17 | Stop reason: HOSPADM

## 2018-10-09 RX ORDER — FAMOTIDINE 20 MG/1
20 TABLET, FILM COATED ORAL NIGHTLY
Status: CANCELLED | OUTPATIENT
Start: 2018-10-09

## 2018-10-09 RX ORDER — ACETAMINOPHEN 650 MG/20.3ML
650 LIQUID ORAL ONCE
Status: CANCELLED | OUTPATIENT
Start: 2018-10-09 | End: 2018-10-09

## 2018-10-09 RX ORDER — GLUCAGON 1 MG
1 KIT INJECTION CONTINUOUS PRN
Status: CANCELLED | OUTPATIENT
Start: 2018-10-09

## 2018-10-09 RX ORDER — DIPHENHYDRAMINE HYDROCHLORIDE 50 MG/ML
50 INJECTION INTRAMUSCULAR; INTRAVENOUS ONCE
Status: CANCELLED | OUTPATIENT
Start: 2018-10-09 | End: 2018-10-09

## 2018-10-09 RX ORDER — SULFAMETHOXAZOLE AND TRIMETHOPRIM 400; 80 MG/1; MG/1
1 TABLET ORAL EVERY MORNING
Status: CANCELLED | OUTPATIENT
Start: 2018-10-09

## 2018-10-09 RX ORDER — LISINOPRIL 10 MG/1
20 TABLET ORAL DAILY
Status: ON HOLD | COMMUNITY
End: 2019-01-15 | Stop reason: HOSPADM

## 2018-10-09 RX ORDER — LORAZEPAM 2 MG/1
1 TABLET ORAL 2 TIMES DAILY
COMMUNITY
End: 2018-10-15 | Stop reason: SDUPTHER

## 2018-10-09 RX ORDER — MORPHINE SULFATE 1 MG/ML
INJECTION INTRAVENOUS CONTINUOUS
Status: CANCELLED | OUTPATIENT
Start: 2018-10-09

## 2018-10-09 RX ORDER — NALOXONE HCL 0.4 MG/ML
0.02 VIAL (ML) INJECTION
Status: CANCELLED | OUTPATIENT
Start: 2018-10-09

## 2018-10-09 RX ORDER — NYSTATIN 100000 [USP'U]/ML
500000 SUSPENSION ORAL
Status: CANCELLED | OUTPATIENT
Start: 2018-10-09

## 2018-10-09 RX ORDER — DOCUSATE SODIUM 100 MG/1
100 CAPSULE, LIQUID FILLED ORAL 3 TIMES DAILY
Status: CANCELLED | OUTPATIENT
Start: 2018-10-09

## 2018-10-09 RX ORDER — SODIUM CHLORIDE 9 MG/ML
INJECTION, SOLUTION INTRAVENOUS CONTINUOUS
Status: CANCELLED | OUTPATIENT
Start: 2018-10-09 | End: 2018-10-11

## 2018-10-09 RX ORDER — MUPIROCIN 20 MG/G
1 OINTMENT TOPICAL 2 TIMES DAILY
Status: CANCELLED | OUTPATIENT
Start: 2018-10-09 | End: 2018-10-14

## 2018-10-09 RX ORDER — IBUPROFEN 200 MG
24 TABLET ORAL
Status: CANCELLED | OUTPATIENT
Start: 2018-10-09

## 2018-10-09 RX ORDER — BISACODYL 5 MG
10 TABLET, DELAYED RELEASE (ENTERIC COATED) ORAL NIGHTLY
Status: CANCELLED | OUTPATIENT
Start: 2018-10-09

## 2018-10-09 RX ORDER — DEXTROSE MONOHYDRATE AND SODIUM CHLORIDE 5; .45 G/100ML; G/100ML
INJECTION, SOLUTION INTRAVENOUS CONTINUOUS
Status: CANCELLED | OUTPATIENT
Start: 2018-10-09 | End: 2018-10-10

## 2018-10-09 RX ORDER — TACROLIMUS 0.5 MG/1
2 CAPSULE ORAL 2 TIMES DAILY
Status: CANCELLED | OUTPATIENT
Start: 2018-10-09

## 2018-10-09 NOTE — LETTER
October 9, 2018        Dalton Heaton  [address] [facility] NEPHROLOGY [place]  [place] [address]  Phone: [phone]  Fax: [phone]             [facility]  [address]  [facility] [address]  Phone: [phone]   Patient: Andra Newell   MR Number: [id number]   YOB: 1960   Date of Visit: 10/9/2018       Dear Dr. Dalton Heaton    Thank you for referring Andra Newell to me for evaluation. Attached you will find relevant portions of my assessment and plan of care.    If you have questions, please do not hesitate to call me. I look forward to following Andra Newell along with you.    Sincerely,    Bogdan Ennis MD    Enclosure    If you would like to receive this communication electronically, please contact [email or web address] or [phone] to request [employer] Link access.    [employer] is a tool which provides read-only access to select patient information with whom you have a relationship. Its easy to use and provides real time access to review your patients record including encounter summaries, notes, results, and demographic information.    If you feel you have received this communication in error or would no longer like to receive these types of communications, please e-mail [email or web address]

## 2018-10-09 NOTE — PATIENT INSTRUCTIONS
1. Submit stool for infectious work-up   2. Notify transplant team with any changes in your health   3. Stay active   4. We will bring up for group discussion regarding need for further evaluation of the diarrhea

## 2018-10-09 NOTE — PROGRESS NOTES
PRE-OP TEACHING NOTE    Andra Newell is here today for pre-op appointments.  Pre-op instructions reviewed and written information was provided.  All questions were answered.  Pt reports diarrhea x 2 weeks.  Will send stool studies and CMV PCR.  Will bring patient up for discussion tomorrow morning.  Discussed possibility that surgery may be rescheduled if the program is busy with  donor transplants.  Patient agreed and verbalized understanding of all instructions.
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
## 2018-10-09 NOTE — LETTER
October 9, 2018        Dalton eHaton  [address] [facility] NEPHROLOGY [place]  [place] [address]  Phone: [phone]  Fax: [phone]             [facility]  [address]  [facility] [address]  Phone: [phone]   Patient: Andra Newell   MR Number: [id number]   YOB: 1960   Date of Visit: 10/9/2018       Dear Dr. Dalton Heaton    Thank you for referring Andra Newell to me for evaluation. Attached you will find relevant portions of my assessment and plan of care.    If you have questions, please do not hesitate to call me. I look forward to following Andra Newell along with you.    Sincerely,    Anahy Weiss MD    Enclosure    If you would like to receive this communication electronically, please contact [email or web address] or [phone] to request [employer] Link access.    [employer] Link is a tool which provides read-only access to select patient information with whom you have a relationship. Its easy to use and provides real time access to review your patients record including encounter summaries, notes, results, and demographic information.    If you feel you have received this communication in error or would no longer like to receive these types of communications, please e-mail [email or web address]

## 2018-10-09 NOTE — DISCHARGE INSTRUCTIONS
Anesthesia: General Anesthesia  Youre due to have surgery. During surgery, youll be given medication called anesthesia. (It is also called anesthetic.) This will keep you comfortable and pain-free. Your anesthesia provider will use general anesthesia. This sheet tells you more about it.  What is general anesthesia?     You are watched continuously during your procedure by the anesthesia provider   General anesthesia puts you into a state like deep sleep. It goes into the bloodstream (IV anesthetics), into the lungs (gas anesthetics), or both. You feel nothing during the procedure. You will not remember it. During the procedure, the anesthesia provider monitors you continuously. He or she checks your heart rate and rhythm, blood pressure, breathing, and blood oxygen.  · IV Anesthetics. IV anesthetics are given through an IV line in your arm. Theyre often given first. This is so you are asleep before a gas anesthetic is started. Some kinds of IV anesthetics relieve pain. Others relax you. Your doctor will decide which kind is best in your case.  · Gas Anesthetics. Gas anesthetics are breathed into the lungs. They are often used to keep you asleep. They can be given through a facemask or a tube placed in your larynx or trachea (breathing tube).  ? If you have a facemask, your anesthesia provider will most likely place it over your nose and mouth while youre still awake. Youll breathe oxygen through the mask as your IV anesthetic is started. Gas anesthetic may be added through the mask.  ? If you have a tube in the larynx or trachea, it will be inserted into your throat after youre asleep.  Anesthesia tools and medications  You will likely have:  · IV anesthetics. These are put into an IV line into your bloodstream.  · Gas anesthetics. You breathe these anesthetics into your lungs, where they pass into your bloodstream.  · Pulse oximeter. This is a small clip that is attached to the end of your finger. This  measures your blood oxygen level.  · Electrocardiography leads (electrodes). These are small sticky pads that are placed on your chest. They record your heart rate and rhythm.  · Blood pressure cuff. This reads your blood pressure.  Risks and possible complications  General anesthesia has some risks. These include:  · Breathing problems  · Nausea and vomiting  · Sore throat or hoarseness (usually temporary)  · Allergic reaction to the anesthetic  · Irregular heartbeat (rare)  · Cardiac arrest (rare)   Anesthesia safety  · Follow all instructions you are given for how long not to eat or drink before your procedure.  · Be sure your doctor knows what medications and drugs you take. This includes over-the-counter medications, herbs, supplements, alcohol or other drugs. You will be asked when those were last taken.  · Have an adult family member or friend drive you home after the procedure.  · For the first 24 hours after your surgery:  ? Do not drive or use heavy equipment.  ? Have a trusted family member or spouse make important decisions or sign documents.  ? Avoid alcohol.  ? Have a responsible adult stay with you. He or she can watch for problems and help keep you safe.  Date Last Reviewed: 10/16/2014  © 5737-8472 [employer]. [address] [place] [address], [place] [address]. All rights reserved. This information is not intended as a substitute for professional medical care. Always follow your healthcare professional's instructions.

## 2018-10-09 NOTE — PROGRESS NOTES
"   Kidney Transplant Recipient Preoperative Evaluation    Subjective:     CC:  Preoperative evaluation of kidney transplant candidacy.    HPI:  Ms. Newell is a 58 y.o. year old White female who has been approved to receive a living unrelated kidney transplant.  She has ESRD secondary to diabetic nephropathy and HTN +/- antibiotic induced nephrotoxicity (states she received 6 weeks of antibiotics for MRSA infection - possible osteomyelitis).  Patient is currently predialysis - states she was on HD since 4/13/18 until ~beginning of Sept 2018 but was able to come off dialysis.  She has presented for her final preoperative medical evaluation.  She denies any recent hospitalizations but was seen in the ED x2 for back muscle spasm - states that after the second ED visit she was advised to go to a massage therapist and that relieved all of her symptoms.     BP is low today at 109/56 but states they used a "real big cuff" and she had just taken her BP meds. She does not check her BP at home but states she will check it once a week at [facility] and at doctors visits and states it is always "fine". Repeat /89.     See this provider's listed RR clinic visit note dated 7/19/18 for further info including functional status assessment.    Review of Systems   Constitutional: +fatigue; Denies fever/chills, night sweats  EENT: +wears eye glasses; Denies vision problems, hearing problems, trouble swallowing.   Respiratory: +BOYER when she walks fast; +snoring - never been tested for sleep apnea; Denies orthopnea, wheezing, hemoptysis, denies known TB exposure or history of positive TB skin test  Cardiovascular: Denies chest pain, palpitations, history of MI, history of stroke, history of DVT  Gastrointestinal: +watery diarrhea for ~2 weeks - states will have 6 watery BMs on three out of the 7 days of the week - states a lot of it will within an hour of eating - last episode was on Sunday (10/7/18); Denies abdominal pain, " nausea/vomiting/constipation. Denies history of GI bleeding or ulcers.   Genitourinary: Denies history of kidney stones, recurrent UTI's, history of urinary obstruction, hematuria, dysuria, urinary frequency, incontinence  Musculoskeletal: +left knee pain/stiffness - doesn't take any pain pills; Denies trouble moving extremities, denies joint pain or swelling  Skin: Denies history of skin cancer, denies rash, ulcerations  Heme/onc: +easy bruising; Denies any history of cancer  Endocrine: +hypothyroidism  Neurological: +peripheral neuropathy; Denies tremors, seizures, dizziness, headaches,   Psychiatric: +anxiety/depression - denies SI/HI; takes Ativan 1 mg BID. Denies hallucinations or delusions.    Medications:  Current Outpatient Medications   Medication Sig Dispense Refill    atorvastatin (LIPITOR) 40 MG tablet Take 1 tablet (40 mg total) by mouth once daily. 90 tablet 1    biotin 10,000 mcg Cap Take 1 capsule by mouth once daily. Hair loss      calcitRIOL (ROCALTROL) 0.25 MCG Cap Take 0.25 mcg by mouth once daily.      famotidine (PEPCID) 20 MG tablet Take 1 tablet (20 mg total) by mouth nightly as needed for Heartburn. 30 tablet 5    hydrALAZINE (APRESOLINE) 25 MG tablet Take 1 tablet (25 mg total) by mouth every 12 (twelve) hours. 60 tablet 5    insulin glargine (BASAGLAR KWIKPEN U-100 INSULIN) 100 unit/mL (3 mL) InPn pen Inject 90 Units into the skin 2 (two) times daily. 90 units SQ QAM, 90 units SQ QPM 3 Box 5    levothyroxine (SYNTHROID) 75 MCG tablet Take 1 tablet (75 mcg total) by mouth once daily. 30 tablet 5    lisinopril (PRINIVIL,ZESTRIL) 20 MG tablet Take 1 tablet (20 mg total) by mouth once daily. (Patient taking differently: Take 40 mg by mouth once daily. ) 90 tablet 1    loratadine (CLARITIN) 10 mg tablet Take 1 tablet (10 mg total) by mouth once daily. 90 tablet 1    LORazepam (ATIVAN) 1 MG tablet Take 1 tablet (1 mg total) by mouth 2 (two) times daily. 60 tablet 2    pen needle,  "diabetic (BD ULTRA-FINE MINI PEN NEEDLE) 31 gauge x 3/16" Ndle 1 each by Misc.(Non-Drug; Combo Route) route once daily. 100 each 1    torsemide (DEMADEX) 20 MG Tab Take 20 mg by mouth once daily.      vilazodone (VIIBRYD) 40 mg Tab tablet Take 1 tablet (40 mg total) by mouth once daily. 30 tablet 5     No current facility-administered medications for this visit.          Objective:     Blood pressure (!) 109/56, pulse 87, temperature 98.1 °F (36.7 °C), temperature source Oral, resp. rate 17, height 5' 5" (1.651 m), weight 102.4 kg (225 lb 12 oz), last menstrual period 02/28/2012, SpO2 98 %.     Physical Exam   General: No acute distress, well groomed, alert and oriented x 3  HEENT: Normocephalic, atraumatic, PERRLA, sclera anicteric, conjunctiva/corneas clear, EOM's intact bilaterally, external inspection of ears and nose normal, moist mucous membranes, no oral ulcerations/lesions   Neck: Supple, symmetrical, trachea midline, no masses, no carotid bruits, no JVD, thyroid is normal without nodules or enlargement   Respiratory: Clear to auscultation bilaterally, respirations unlabored, no rales/rhonchi/wheezing   Cardiovacular: Regular rate and rhythm, S1, S2 normal, no murmurs, no rubs or gallops   Gastrointestinal: Soft, non-tender, bowel sounds normal, no masses, no palpable organomegaly  Extremities: No clubbing or cyanosis of upper extremities bilaterally, no pedal edema bilaterally; +2 bilateral radial pulses  Skin: warm and dry; no rash on exposed skin  Lymph nodes: Cervical and supraclavicular nodes normal   Neurologic: No focal neurologic deficits.   Musculoskeletal: moves all extremities without difficulty, FROM, 5/5 strength, ambulates without an assistive device  Psychiatric: Normal mood and affect. Responds appropriately to questions.  Access: LUE AVF +thrill/bruit     Labs:  No results found for requested labs within last 720 hours.  Labs were reviewed with the patient.    ABO type: B " POS    Assessment:     1. ESRD on dialysis    2. Hyperlipidemia, unspecified hyperlipidemia type    3. Intestinal strongyloidiasis    4. Arthritis    5. Anemia in chronic kidney disease, on chronic dialysis    6. Hypothyroidism, unspecified type    7. Pre-operative clearance        Plan:      Transplant Candidacy:   Based on available information, Ms. Newell reports watery diarrhea for ~2 weeks - states will have 6 watery BMs on three out of the 7 days of the week - states a lot of it will within an hour of eating - last episode was on Sunday (10/7/18) thus will obtain stool studies and CMV PCR. She may need GI evaluation. Will need to bring up for group discussion regarding diarrhea symptoms.  She will need the above items to determine whether she can proceed with transplant surgery as planned.    Advised her that we want to make sure she does not have any infectious process and that her safety is our main priority. Explained how we will bring up her case for group discussion to determine whether she would need further evaluation of the loose stools.    Instructed her to notify transplant team with any changes in her health.     Anahy Weiss MD       Follow-up:  After the transplant, the patient will follow-up with the kidney transplant team and get blood work per standard protocol as described below.    Clinic: return to transplant clinic weekly for the first month after transplant; every 2 weeks during months 2-3; then at 6-, 9-, 12-, 18-, 24-, and 36- months post-transplant to reassess for complications from immunosuppression and monitor for rejection.  Annually thereafter.    Labs: continue twice weekly CBC, renal panel, and drug level for first month; then same labs once weekly through 3rd month post-transplant.  Urine for UA, protein/creatinine ratio monthly.  Add urine BK - PCR at 1-, 3-, 6-, 9-, 12-, 18-, 24-, and 36- months post-transplant.  Hepatic panel at 1-, 2-, 3-, 6-, 9-, 12-, 18-, 24-, and 36-  months post-transplant.

## 2018-10-09 NOTE — PROGRESS NOTES
Met with Andra Newell in the clinic as part of her pre-transplant clearance appointment.    1) Performed a complete medication reconciliation.  Done - patient on Vibryd, will bring home supply.  Patient also reports itching with pain meds, tolerates with benadryl  2) Obtained copies of insurance cards, patient understood that the Pharm.D. will order medications, and that medications must be available prior to discharge   3) Discussed medication education that will occur post-transplant     Patient verbalized understanding and had the opportunity to ask questions    Patient [place] + [place] D  Okay using ORx  Consenting to CMV study

## 2018-10-09 NOTE — PROGRESS NOTES
Kidney Transplant Recipient Preoperative Evaluation    Subjective:     CC:  Preoperative evaluation of kidney transplant candidacy.    HPI:  Ms. Newell is a 58 y.o. year old White female who has been approved to receive a living donor kidney transplant.  She has advanced kidney disease secondary to diabetic nephropathy.  Patient is predialysis.  She has presented for her final preoperative medical evaluation.  She denies any recent hospitalizations or ED visits.      Past Medical History:   Diagnosis Date    Anemia     Anemia in chronic kidney disease, on chronic dialysis 7/12/2017    Arthritis     Asthma     allergic airway    Depression     Diabetes mellitus     Eosinophilia     ESRD (end stage renal disease)     stage V, due for living donor 9/2018    ESRD on dialysis     Gout     Gout     Hyperlipidemia     Hypertension     Low back pain     MRSA carrier     Obesity     Renal disorder     Rotator cuff syndrome--left     Thyroid disease     Ulnar neuropathy of right upper extremity     Uncontrolled type 2 diabetes mellitus with hyperglycemia      Past Surgical History:   Procedure Laterality Date    ACHILLES TENDON SURGERY Left May 2015    ARTHROSCOPY, HIP Left 10/3/2013    [facility]    ARTHROSCOPY-HIP WITH TROCHANTERIC BURSECTOMY Left 10/3/2013    [facility] OR    ARTHROSCOPY-SHOULDER Left 11/24/2015    [facility]    ARTHROSCOPY-SHOULDER WITH SUBACROMIAL DECOMPRESSION Left 7/12/2016    [facility] OR    BACK SURGERY      CHOLECYSTECTOMY      COLONOSCOPY N/A 9/6/2017    Procedure: COLONOSCOPY;  Surgeon: Marisol Bryson MD;  Location: [facility];  Service: Endoscopy;  Laterality: N/A;    COLONOSCOPY N/A 9/6/2017    [facility]    DEBRIDEMENT-SHOULDER-ARTHROSCOPIC Left 7/12/2016    [facility]  OR    DEBRIDEMENT-SHOULDER-ARTHROSCOPIC; LABRAL Left 11/24/2015    [facility] OR    DIALYSIS FISTULA CREATION  12/2017    FEMOROPLASTY, ARTHROSCOPIC Left 10/3/2013    [facility] OR    HEMORRHOID SURGERY      INJECTION-AMNIOX Left 7/12/2016    [facility] OR    JOINT REPLACEMENT      left    KNEE SURGERY      LYSIS-ADHESION Left 11/24/2015    [facility] OR    REPAIR ROTATOR CUFF ARTHROSCOPIC Left 7/12/2016    [facility] OR    REPAIR-TENDON-BICEP Left 11/24/2015    [facility] OR    SHOULDER ARTHROSCOPY      SHOULDER SURGERY       Review of patient's allergies indicates:   Allergen Reactions    Codeine Itching     Can take oxycodone and hydrocodone with Benadryl     Review of Systems   Constitutional: Negative.  Negative for appetite change, chills, fatigue, fever and unexpected weight change.   HENT: Negative for dental problem, facial swelling, mouth sores, nosebleeds, sore throat and voice change.    Eyes: Negative for photophobia, discharge, itching and visual disturbance.   Respiratory: Negative for cough, chest tightness, shortness of breath, wheezing and stridor.    Cardiovascular: Negative for chest pain, palpitations and leg swelling.   Gastrointestinal: Negative for abdominal distention, abdominal pain, blood in stool, constipation, diarrhea, nausea and vomiting.   Genitourinary: Negative for difficulty urinating, dysuria, hematuria, urgency, vaginal bleeding and vaginal discharge.   Musculoskeletal: Negative for arthralgias, back pain, joint swelling and myalgias.   Skin: Negative for color change, rash and wound.   Neurological: Negative for dizziness, tremors, seizures, syncope, speech difficulty, numbness and headaches.   Hematological: Negative for adenopathy. Does not bruise/bleed easily.   Psychiatric/Behavioral:  "Negative for agitation, confusion, dysphoric mood, hallucinations and sleep disturbance. The patient is not nervous/anxious.        Objective:     Blood pressure (!) 109/56, pulse 87, temperature 98.1 °F (36.7 °C), temperature source Oral, resp. rate 17, height 5' 5" (1.651 m), weight 102.4 kg (225 lb 12 oz), last menstrual period 02/28/2012, SpO2 98 %.  Physical Exam   Constitutional: She is oriented to person, place, and time. She appears well-developed and well-nourished.   HENT:   Head: Normocephalic and atraumatic.   Right Ear: External ear normal.   Left Ear: External ear normal.   Nose: Nose normal.   Mouth/Throat: Oropharynx is clear and moist. No oropharyngeal exudate.   Eyes: EOM are normal. Pupils are equal, round, and reactive to light. No scleral icterus.   Neck: Normal range of motion. Neck supple. No JVD present. No tracheal deviation present. No thyromegaly present.   Cardiovascular: Normal rate, regular rhythm, normal heart sounds and intact distal pulses. Exam reveals no gallop and no friction rub.   No murmur heard.  Pulmonary/Chest: Effort normal and breath sounds normal. No respiratory distress. She has no wheezes. She has no rales.   Abdominal: Soft. Bowel sounds are normal. She exhibits no distension and no mass. There is no tenderness.   Severe central obesity with large lower abdominal pannus. Femoral pulses are palpable and adequate.   Musculoskeletal: Normal range of motion. She exhibits no edema or tenderness.   Lymphadenopathy:     She has no cervical adenopathy.   Neurological: She is alert and oriented to person, place, and time. No cranial nerve deficit. Coordination normal.   Skin: Skin is warm and dry. No rash noted. No erythema.   Psychiatric: She has a normal mood and affect. Her behavior is normal. Judgment and thought content normal.       Labs:  10/9/2018: Prothrombin Time 10.4 sec (Ref range: 9.0 - 12.5 sec)  Labs were reviewed with the patient.    ABO type: B " POS    Assessment:     1. ESRD on dialysis    2. Organ transplant candidate        Plan:      Transplant Candidacy:   Based on available information, Ms. Newell remains a high-risk but acceptable kidney transplant candidate, with the risk related to her severe central obesity and long-standing diabetes.  She may proceed with transplant surgery as planned.    Bogdan Ennis MD

## 2018-10-10 ENCOUNTER — TELEPHONE (OUTPATIENT)
Dept: TRANSPLANT | Facility: CLINIC | Age: 58
End: 2018-10-10

## 2018-10-10 ENCOUNTER — COMMITTEE REVIEW (OUTPATIENT)
Dept: TRANSPLANT | Facility: CLINIC | Age: 58
End: 2018-10-10

## 2018-10-10 DIAGNOSIS — Z76.82 ORGAN TRANSPLANT CANDIDATE: Primary | ICD-10-CM

## 2018-10-10 NOTE — TELEPHONE ENCOUNTER
"Called patient to discuss the committee's decision to cancel surgery and to pursue evaluation of diarrhea.  Informed patient that her coordinator will contact her with the necessary testing needed.  Pt expressed that if "I have to go through all this again then I'm not going to do it.  It was all too much for her she stated.  Explained to patient that we are doing what is safest for her and are doing everything necessary to help her with the transplant process.  Pt stated it was ok to discuss with donor.  All questions answered and patient verbalized understanding.  "

## 2018-10-10 NOTE — TELEPHONE ENCOUNTER
Labs sent to HD unit, nephrologist        ----- Message from Anahy Weiss MD sent at 10/10/2018  9:01 AM CDT -----  Would send these labs to her general nephrologist - may benefit from sodium bicarbonate

## 2018-10-10 NOTE — PROGRESS NOTES
RECIPIENT PRE-OP NOTE    Potential Recipient Name: Andra Newell, [id number]  Encounter Date: 10/9/2018    Sex: female  YOB: 1960  Age: 58 y.o.    Housing/Contact Info:  [address] [facility] [address]    Telephone Information:   Mobile [phone]    Home: [phone] (home)  Work: There is no work phone number on file.  E-mail: [email or web address]    Potential Surgery Date: 10-15-18  Potential Donor Name: Gatito Rodriguez, Clinic Number: [id number]  Potential Donor Relationship: friend    Patient presents as alert and oriented x 4, pleasant, good eye contact, well groomed, recall good, concentration/judgement good, average intelligence, calm, communicative, cooperative and asking and answering questions appropriately. Patient presents as a 58 y.o. year old female to recipient pre-op appointment for scheduled kidney living donor surgery. Patient's significant other accompanies patient.  Patient states that she is independent with ADLs at this time.  Patient states motivation to pursue organ transplant at this time.    Does patient drive?  Patient is aware will not be able to drive until medically cleared by the transplant team. Patient verbalizes understanding that patient will need assistance for all transportation needs until medically cleared to drive.    Caregivers/Transportation:  Any Alvarez, 61 yo partner/significant other, Wayne Chin, does drive/own car, employed full time at [address]. [phone]  Mariel Wolff, 61 yo friend, Reji AC, does drive/own car, retired. [phone]  Silvia HopkinsAny's sister, 64 yo, Kwaku CHIN, does drive/own car, retired. [phone]    Dependents/Others who rely on Patient/Caregiver for care: pt denies    Cognitive:  Education: high school  Reading Level: 12th grade  Reports difficulty with: seeing wears glasses; denies any problems learning new information  Denies difficulty with: reading, writing, hearing, comprehension,  learning and memory    Infusion Service: patient utilizing? no  Home Health: patient utilizing? no  DME:  patient utilizing? yes scooter for long distances    Living Will: yes  Healthcare Power of : yes  Written LW/HCPA and verbal information presented to patient today.    Insurance: Payor: MEDICARE / Plan: MEDICARE PART A & B / Product Type: Government /  + [place] Medicaid  Possible concerns regarding insurance post-donation reviewed. Patient verbalizes clear understanding.    Financial:  Employment: Patient is currently disabled. Patient does not plan to return to work once medically cleared. Patient referred to vocational rehabilitation. Pt reports receives disability $500 per month. Pt's significant other reports does assist regularly with patient's expenses and reports plan to assist with any financial needs for transplant.  Spouse/Significant Other Employment: significant other employed full time at [address].  Patient states does not expect to have any financial problems following transplant surgery.  Patient states has conducted fundraising to assist with post-transplant costs: fundraised $600.    Tobacco/Alcohol/Illicit Drug Abuse: Patient reports does not use tobacco products, alcohol, illicit drugs and non-prescription medications and that patient does plan to remain abstinent.     Psychiatric History: Mental Health: As per initial 7-12-17 psychosocial: Pt reports history of suicidal ideation in 2015 after a significant life event of injury at work and impact to her ADL independence. Pt reports was in mental health hospital [facility] for stabilization and was referred to out pt therapy. Pt reports currently in weekly psychotherapy with Mague Muniz LCSW in [facility]. Pt reports psychiatrist Dr. Sheth in [place] x month. Pt reports plan to have psychiatry clearance letter from Dr. Sheth provided to transplant team.  Psychiatrist/Counselor: Dr. Sheth ([facility]) and Mague  Cristy ([place])  Medications:  Lorazepam 1 mg daily and Vilbryd 40 mg daily.   Suicide/Homicide Issues: 2015 [facility] hospitalization for si  Safety at home: Pt reports no problems with safety in the home.    Pt's psychiatrist:  Antonio Sheth MD  [facility]  [address], [place] [address]   [phone]     Pt provided 7-27-17 psychiatry clearance letter from Dr. Antonio Sheth reporting patient is suitable candidate for organ transplant. Letter provided to pre transplant nurse coordinator to place in patient's medical chart.    Coping: Patient states that she is coping well with having kidney living donor surgery. Patient states will call as needed and does understand how to access resources including the  as needed, both inpatient and outpatient.    Resources, information and support provided. Psychosocial aspects regarding organ donation and transplantation were discussed. Patient reports having a clear understanding of resources, information, support and psychosocial aspects.    Discharge Plan: Patient to discharge to own home or [address] [place] apartments (as medically needed) under the care of patient's significant other post-organ transplant. Patient states that patient's significant other will be present as caregiver in the hospital. Patient's significant other will transport patient home. Patient states agreement with not driving and not returning to work until medically cleared to do so.    Patient states having clear understanding and realistic expectations regarding the potential risks and potential benefits of organ transplantation and organ donation. Patient agrees to further discuss with health care team members and support system members, as well as to utilize available resources and express questions and/or concerns. Resources and information provided and reviewed.    Patient reports motivation to proceed with living organ donor  transplantation as scheduled.     Suitability for Transplant: Patient presents as a suitable candidate for organ transplant at this time.  Patient states does have a caregiver plan, transportation plan, and lodging plan in place. Patient states that patient does have medical and prescription medicine insurance in place and does have a plan in place to afford post-transplant costs.    Patient provided verbal permission to release any necessary information to outside resources for patient care and discharge planning.  Resources and information provided and reviewed.  Patient is choosing has no specific agency preferences.    Pt reports does use [facility] for out pt medicines however is in agreement to using [facility] for transplant medicines if insurance allows    Patient states that she is aware of what patient's normal copays and deductibles are for prescription medicines.       provided psychosocial support, counseling, resources, education, assistance, and discharge planning.  remains available.    Recommendations/Additional Comments: Pt reports lives in [place] and reports is in agreement to staying nearby in [address] apartments if medically needed. Pt's significant other/caregiver works and may need employer paperwork completed for any time missed due to transplant.    Patient states is aware of [facility]'s affiliation and/or partnership with agencies in home health care, LTAC, SNF, [facility], and other hospitals and clinics.    Yanira Valle MSW LCSW

## 2018-10-10 NOTE — COMMITTEE REVIEW
Native Organ Dx: Diabetes Mellitus - Type II    Will cancel upcoming living kidney donor transplant until issue below is resolved. Patient will need to be seen by a GI doctor at [facility] to evaluate diarrhea stool. Will have stool tested for Rotovirus. will request Strongyloides Ab IgG due to past hx of  Intestinal strongyloidiasis. If + will consult ID for further treatment.         Note written by PÉREZ Renae     ===============================================

## 2018-10-11 ENCOUNTER — TELEPHONE (OUTPATIENT)
Dept: GASTROENTEROLOGY | Facility: CLINIC | Age: 58
End: 2018-10-11

## 2018-10-11 ENCOUNTER — TELEPHONE (OUTPATIENT)
Dept: TRANSPLANT | Facility: CLINIC | Age: 58
End: 2018-10-11

## 2018-10-11 NOTE — TELEPHONE ENCOUNTER
----- Message from Chente Rondon sent at 10/11/2018  9:27 AM CDT -----  Type:  Sooner Apoointment Request    Caller is requesting a sooner appointment.  Caller declined first available appointment listed below.  Caller will not accept being placed on the waitlist and is requesting a message be sent to doctor.    Name of Caller:  Patient  When is the first available appointment?  11.13  Best Call Back Number:  [phone]  Additional Information:  Patient is pending transplant because of bacterial virus - was told to come see doctor before being cleared for transplant

## 2018-10-11 NOTE — TELEPHONE ENCOUNTER
Spoke to pt regarding sooner appt in GI clinic. Offered pt appt 10/16 at 830am w/FIDENCIO Flowers.

## 2018-10-11 NOTE — TELEPHONE ENCOUNTER
Contacted pt to inform her that she is on the wait list for a sooner appt should one become available. Pt understands.

## 2018-10-11 NOTE — TELEPHONE ENCOUNTER
Returned call to Any, patient's SO. She will schedule an appt for Andra with DR Bryson. GI MD that performed her colonoscopy in 2017. Any will call with date and time of appt.             ----- Message from Yuly Doll RN sent at 10/11/2018  9:28 AM CDT -----  Contact: Patient spouse      ----- Message -----  From: Maureen Valle  Sent: 10/11/2018   9:22 AM  To: Kidney Waitlisted Coordinator    Needs Advice    Reason for call: pt Spouse would like to speak with Conrad Shea          Communication Preference: [phone] (10-6)    Additional Information: n/a

## 2018-10-11 NOTE — TELEPHONE ENCOUNTER
----- Message from Komal Alexander sent at 10/11/2018 12:56 PM CDT -----  Contact: PT  PT is calling regarding diarrhea complications regarding transplant.   PT needs to come in asap        Callback: [phone]

## 2018-10-15 DIAGNOSIS — Z99.2 ESRD ON DIALYSIS: Primary | ICD-10-CM

## 2018-10-15 DIAGNOSIS — N18.6 ESRD ON DIALYSIS: Primary | ICD-10-CM

## 2018-10-15 RX ORDER — LORAZEPAM 2 MG/1
2 TABLET ORAL 2 TIMES DAILY
Qty: 60 TABLET | Refills: 2 | Status: SHIPPED | OUTPATIENT
Start: 2018-10-15 | End: 2019-04-20 | Stop reason: SDUPTHER

## 2018-10-16 ENCOUNTER — OFFICE VISIT (OUTPATIENT)
Dept: GASTROENTEROLOGY | Facility: CLINIC | Age: 58
End: 2018-10-16
Payer: MEDICARE

## 2018-10-16 ENCOUNTER — TELEPHONE (OUTPATIENT)
Dept: TRANSPLANT | Facility: CLINIC | Age: 58
End: 2018-10-16

## 2018-10-16 VITALS
HEART RATE: 75 BPM | WEIGHT: 229.25 LBS | HEIGHT: 65 IN | BODY MASS INDEX: 38.2 KG/M2 | SYSTOLIC BLOOD PRESSURE: 159 MMHG | DIASTOLIC BLOOD PRESSURE: 79 MMHG

## 2018-10-16 DIAGNOSIS — Z87.898 HISTORY OF DIARRHEA: Primary | ICD-10-CM

## 2018-10-16 DIAGNOSIS — R74.8 ELEVATED ALKALINE PHOSPHATASE LEVEL: ICD-10-CM

## 2018-10-16 DIAGNOSIS — Z76.82 KIDNEY TRANSPLANT CANDIDATE: ICD-10-CM

## 2018-10-16 DIAGNOSIS — Z87.448 HISTORY OF CHRONIC KIDNEY DISEASE: ICD-10-CM

## 2018-10-16 DIAGNOSIS — R03.0 ELEVATED BLOOD PRESSURE READING: ICD-10-CM

## 2018-10-16 PROCEDURE — 99214 OFFICE O/P EST MOD 30 MIN: CPT | Mod: PBBFAC,PO,TXP | Performed by: NURSE PRACTITIONER

## 2018-10-16 PROCEDURE — 99999 PR PBB SHADOW E&M-EST. PATIENT-LVL IV: CPT | Mod: PBBFAC,TXP,, | Performed by: NURSE PRACTITIONER

## 2018-10-16 PROCEDURE — 99213 OFFICE O/P EST LOW 20 MIN: CPT | Mod: S$PBB,,, | Performed by: NURSE PRACTITIONER

## 2018-10-16 NOTE — TELEPHONE ENCOUNTER
Spoke with patient, she notified us that the GI doctor has cleared her for transplant. Will have the transplant team review for a decision regarding her transplant.          ----- Message from Yuly Doll RN sent at 10/16/2018  3:09 PM CDT -----  Contact: patient       ----- Message -----  From: Roopa Longo  Sent: 10/16/2018   3:08 PM  To: Kidney Waitlisted Coordinator        ----- Message -----  From: Sneha Meléndez  Sent: 10/16/2018   1:51 PM  To: [facility] Pre-Kidney Transplant Clinical    Patient have a medical question about her transplant and would like a call today if possible.         Please call [phone]        Thanks!

## 2018-10-16 NOTE — TELEPHONE ENCOUNTER
----- Message from CHRISSY Roy sent at 10/16/2018  9:32 AM CDT -----  Regarding: Transplant date?   Andra,    I am with ID research. Dr. Peterson had discussed our CMV prevention study and consented Ms. Newell to enroll after transplant. I saw her transplant was delayed.     I wanted to see if you know yet when the transplant will be rescheduled for?     Thank you,  Sharri

## 2018-10-16 NOTE — PROGRESS NOTES
Subjective:       Patient ID: Andra Newell is a 58 y.o. female Body mass index is 38.15 kg/m².    Chief Complaint: Diarrhea (has resolved)    This patient is new to me.  Referring Provider:  Dr. Weiss.  Established patient of Dr. Bryson.    Diarrhea    Episode onset: started 2-3 weeks after she was started on torsemide, which the patient reports was about 1.5 months ago. The problem has been resolved (stopped 2 days after the torsemide was discontinued which it was discontinued on 10/9/18; bowel movements once daily formed stool). The patient states that diarrhea does not awaken her from sleep. Pertinent negatives include no abdominal pain, bloating, chills, coughing, fever, vomiting or weight loss. Associated symptoms comments: Some flatulence. Treatments tried: since discontinuing torsemide. The treatment provided significant relief. There is no history of bowel resection, inflammatory bowel disease or irritable bowel syndrome.     Review of Systems   Constitutional: Negative for appetite change, chills, fatigue, fever and weight loss.   HENT: Negative for sore throat and trouble swallowing.    Respiratory: Negative for cough, choking and shortness of breath.    Cardiovascular: Negative for chest pain.        Patient reports she did not take her blood pressure medication this morning yet because she has to eat at least 300 calories when she take it.   Gastrointestinal: Negative for abdominal pain, anal bleeding, bloating, blood in stool, constipation, diarrhea (resolved), nausea, rectal pain and vomiting.        GERD controlled on pepcid 20 mg once daily; if she does not take her symptoms return   Genitourinary: Negative for difficulty urinating, dysuria and flank pain.   Neurological: Negative for weakness.       Past Medical History:   Diagnosis Date    Anemia     Anemia in chronic kidney disease, on chronic dialysis 7/12/2017    Arthritis     Asthma     allergic airway    Colon polyp      Depression     Diabetes mellitus     Diverticulosis     Eosinophilia     ESRD (end stage renal disease)     stage V, due for living donor 9/2018    ESRD on dialysis     GERD (gastroesophageal reflux disease)     Gout     Gout     Hyperlipidemia     Hypertension     Low back pain     MRSA carrier     Obesity     Renal disorder     Rotator cuff syndrome--left     Thyroid disease     Ulnar neuropathy of right upper extremity     Uncontrolled type 2 diabetes mellitus with hyperglycemia      Past Surgical History:   Procedure Laterality Date    ACHILLES TENDON SURGERY Left May 2015    ARTHROSCOPY, HIP Left 10/3/2013    [facility]    ARTHROSCOPY-HIP WITH TROCHANTERIC BURSECTOMY Left 10/3/2013    [facility]    ARTHROSCOPY-SHOULDER Left 11/24/2015    [facility]    ARTHROSCOPY-SHOULDER WITH SUBACROMIAL DECOMPRESSION Left 7/12/2016    [facility]    BACK SURGERY      CHOLECYSTECTOMY      COLONOSCOPY N/A 9/6/2017    Procedure: COLONOSCOPY;  Surgeon: Marisol Bryson MD;  Location: [facility];  Service: Endoscopy;  Laterality: N/A; REPEAT IN 3 YEARS FOR SURVEILLANCE    COLONOSCOPY N/A 9/6/2017    [facility]    DEBRIDEMENT-SHOULDER-ARTHROSCOPIC Left 7/12/2016    [facility]    DEBRIDEMENT-SHOULDER-ARTHROSCOPIC; LABRAL Left 11/24/2015    [facility]    DIALYSIS FISTULA CREATION  12/2017    FEMOROPLASTY, ARTHROSCOPIC Left 10/3/2013    [facility]    HEMORRHOID SURGERY      INJECTION-AMNIOX Left 7/12/2016    [facility]    JOINT REPLACEMENT      left    KNEE SURGERY      LYSIS-ADHESION Left 11/24/2015    [facility]    REPAIR ROTATOR CUFF ARTHROSCOPIC Left 7/12/2016    [facility]    REPAIR-TENDON-BICEP Left 11/24/2015    [facility]    SHOULDER ARTHROSCOPY      SHOULDER SURGERY      UPPER GASTROINTESTINAL ENDOSCOPY  ~2013     [facility]     Family History   Problem Relation Age of Onset    Diabetes Mother     Alzheimer's disease Mother     Thyroid disease Mother     Hyperlipidemia Mother     Heart disease Father     Stroke Father     Diabetes Sister     Lupus Sister     Ovarian cancer Sister     Heart disease Brother     No Known Problems Son     Diabetes Maternal Grandmother     Hypertension Maternal Grandmother     No Known Problems Paternal Grandmother     No Known Problems Paternal Grandfather     COPD Maternal Aunt     Diabetes Maternal Aunt     Heart disease Maternal Aunt     Hypertension Maternal Aunt     No Known Problems Maternal Uncle     No Known Problems Paternal Aunt     No Known Problems Paternal Uncle     Colon cancer Neg Hx     Colon polyps Neg Hx     Crohn's disease Neg Hx     Ulcerative colitis Neg Hx     Celiac disease Neg Hx      Wt Readings from Last 10 Encounters:   10/16/18 104 kg (229 lb 4.5 oz)   10/09/18 103.4 kg (228 lb)   10/09/18 102.4 kg (225 lb 12 oz)   10/09/18 102.4 kg (225 lb 12 oz)   10/09/18 102.4 kg (225 lb 12 oz)   10/09/18 102.4 kg (225 lb 12 oz)   09/04/18 102.8 kg (226 lb 10.1 oz)   08/27/18 122.4 kg (269 lb 14.4 oz)   08/16/18 103.9 kg (229 lb 0.9 oz)   08/14/18 103 kg (227 lb 1.2 oz)     Lab Results   Component Value Date    WBC 8.33 10/09/2018    HGB 10.7 (L) 10/09/2018    HCT 34.2 (L) 10/09/2018    MCV 94 10/09/2018     10/09/2018     CMP  Sodium   Date Value Ref Range Status   10/09/2018 141 136 - 145 mmol/L Final     Potassium   Date Value Ref Range Status   10/09/2018 4.8 3.5 - 5.1 mmol/L Final     Chloride   Date Value Ref Range Status   10/09/2018 113 (H) 95 - 110 mmol/L Final     CO2   Date Value Ref Range Status   10/09/2018 19 (L) 23 - 29  "mmol/L Final     Glucose   Date Value Ref Range Status   10/09/2018 111 (H) 70 - 110 mg/dL Final     BUN, Bld   Date Value Ref Range Status   10/09/2018 77 (H) 6 - 20 mg/dL Final     Creatinine   Date Value Ref Range Status   10/09/2018 4.0 (H) 0.5 - 1.4 mg/dL Final     Calcium   Date Value Ref Range Status   10/09/2018 9.4 8.7 - 10.5 mg/dL Final     Total Protein   Date Value Ref Range Status   10/09/2018 7.6 6.0 - 8.4 g/dL Final     Albumin   Date Value Ref Range Status   10/09/2018 3.8 3.5 - 5.2 g/dL Final     Total Bilirubin   Date Value Ref Range Status   10/09/2018 0.5 0.1 - 1.0 mg/dL Final     Comment:     For infants and newborns, interpretation of results should be based  on gestational age, weight and in agreement with clinical  observations.  Premature Infant recommended reference ranges:  Up to 24 hours.............<8.0 mg/dL  Up to 48 hours............<12.0 mg/dL  3-5 days..................<15.0 mg/dL  6-29 days.................<15.0 mg/dL       Alkaline Phosphatase   Date Value Ref Range Status   10/09/2018 228 (H) 55 - 135 U/L Final     AST   Date Value Ref Range Status   10/09/2018 15 10 - 40 U/L Final     ALT   Date Value Ref Range Status   10/09/2018 21 10 - 44 U/L Final     Anion Gap   Date Value Ref Range Status   10/09/2018 9 8 - 16 mmol/L Final     eGFR if    Date Value Ref Range Status   10/09/2018 13.2 (A) >60 mL/min/1.73 m^2 Final     eGFR if non    Date Value Ref Range Status   10/09/2018 11.5 (A) >60 mL/min/1.73 m^2 Final     Comment:     Calculation used to obtain the estimated glomerular filtration  rate (eGFR) is the CKD-EPI equation.        Lab Results   Component Value Date    TSH 0.504 09/10/2018     Reviewed prior medical records including radiology report of 4/2/18 abdominal x-ray; 10/9/18 stool studies; & endoscopy history (see surgical history).    9/6/17 Colonoscopy was reviewed and procedure report states:   "Findings:       Three semi-sessile " "polyps were found in the recto-sigmoid colon and        transverse colon. The polyps were 3 to 6 mm in size. These polyps        were removed with a cold snare. Resection and retrieval were        complete.       Many small-mouthed diverticula were found from sigmoid to descending        colon.       Skin tags were found on perianal exam.  Impression:          - Three 3 to 6 mm polyps at the recto-sigmoid colon                        and in the transverse colon, removed with a cold                        snare. Resected and retrieved.                       - Diverticulosis from sigmoid to descending colon.                       - Perianal skin tags found on perianal exam.  Recommendation:      - Patient has a contact number available for                        emergencies. The signs and symptoms of potential                        delayed complications were discussed with the                        patient. Return to normal activities tomorrow.                        Written discharge instructions were provided to the                        patient.                       - Resume previous diet.                       - Continue present medications.                       - Await pathology results.                       - Repeat colonoscopy in 3 years for surveillance                        based on pathology results.                       - Return to my office PRN.                       - Discharge patient to home (with escort).".  Biopsy results:   "FINAL PATHOLOGIC DIAGNOSIS  1. Polyps, transverse colon, polypectomy:  Abundant plant matter only.  Negative for bowel epithelium.  2. Polyp, rectosigmoid colon, polypectomy:  Tubular adenoma, multiple fragments."  Objective:      Physical Exam   Constitutional: She is oriented to person, place, and time. She appears well-developed and well-nourished. No distress.   HENT:   Mouth/Throat: Oropharynx is clear and moist and mucous membranes are normal. No oral lesions. No " oropharyngeal exudate.   Eyes: Conjunctivae are normal. Pupils are equal, round, and reactive to light. No scleral icterus.   Cardiovascular: Normal rate.   Pulmonary/Chest: Effort normal and breath sounds normal. No respiratory distress. She has no wheezes.   Abdominal: Soft. Normal appearance and bowel sounds are normal. She exhibits no distension, no abdominal bruit and no mass. There is no hepatosplenomegaly. There is no tenderness. There is no rigidity, no rebound, no guarding, no tenderness at McBurney's point and negative Chase's sign. No hernia.    Injection site bruising noted to abdomen (insulin use).   Neurological: She is alert and oriented to person, place, and time.   Skin: Skin is warm and dry. No rash noted. She is not diaphoretic. No erythema. No pallor.   Non-jaundiced   Psychiatric: She has a normal mood and affect. Her behavior is normal. Judgment and thought content normal.   Nursing note and vitals reviewed.      Assessment:       1. History of diarrhea    2. History of chronic kidney disease    3. Kidney transplant candidate    4. Elevated blood pressure reading    5. Elevated alkaline phosphatase level        Plan:       History of diarrhea  - diarrhea is listed as one of the side effects of torsemide via [employer]  - if diarrhea recurs, recommend patient follow-up in clinic for further evaluation and management    History of chronic kidney disease & Kidney transplant candidate  Recommend follow-up with nephrology & transplant team for continued evaluation and management.    Elevated blood pressure reading  - requested staff to repeat BP  - instructed patient to monitor blood pressure at home and follow-up with PCP &/or cardiologist  - If blood pressure remains elevated and/or experiencing symptoms of headache, chest pain, shortness of breath, and/or blurred vision, recommend going to ER for further evaluation and management    Elevated alkaline phosphatase level  Recommend follow-up with  Primary Care Provider for continued evaluation and management.    Follow-up in about 1 month (around 11/16/2018), or if symptoms worsen or fail to improve.      If no improvement in symptoms or symptoms worsen, call/follow-up at clinic or go to ER.

## 2018-10-16 NOTE — PATIENT INSTRUCTIONS
Uncertain Causes of Diarrhea (Adult)    Diarrhea is when stools are loose and watery. This can be caused by:  · Viral infections  · Bacterial infections  · Food poisoning  · Parasites  · Irritable bowel syndrome (IBS)  · Inflammatory bowel diseases such as ulcerative colitis, Crohn's disease, and celiac disease  · Food intolerance, such as to lactose, the sugar found in milk and milk products  · Reaction to medicines like antibiotics, laxatives, cancer drugs, and antacids  Along with diarrhea, you may also have:  · Abdominal pain and cramping  · Nausea and vomiting  · Loss of bowel control  · Fever and chills  · Bloody stools  In some cases, antibiotics may help to treat diarrhea. You may have a stool sample test. This is done to see what is causing your diarrhea, and if antibiotics will help treat it. The results of a stool sample test may take up to 2 days. The healthcare provider may not give you antibiotics until he or she has the stool test results.  Diarrhea can cause dehydration. This is the loss of too much water and other fluids from the body. When this occurs, body fluid must be replaced. This can be done with oral rehydration solutions. Oral rehydration solutions are available at drugstores and grocery stores without a prescription.  Home care  Follow all instructions given by your healthcare provider. Rest at home for the next 24 hours, or until you feel better. Avoid caffeine, tobacco, and alcohol. These can make diarrhea, cramping, and pain worse.  If taking medicines:  · Dont take over-the-counter diarrhea or nausea medicines unless your healthcare provider tells you to.  · You may use acetaminophen or NSAID medicines like ibuprofen or naproxen to reduce pain and fever. Dont use these if you have chronic liver or kidney disease, or ever had a stomach ulcer or gastrointestinal bleeding. Don't use NSAID medicines if you are already taking one for another condition (like arthritis) or are on daily  aspirin therapy (such as for heart disease or after a stroke). Talk with your healthcare provider first.  · If antibiotics were prescribed, be sure you take them until they are finished. Dont stop taking them even when you feel better. Antibiotics must be taken as a full course.  To prevent the spread of illness:  · Remember that washing with soap and water and using alcohol-based  is the best way to prevent the spread of infection.  · Clean the toilet after each use.  · Wash your hands before eating.  · Wash your hands before and after preparing food. Keep in mind that people with diarrhea or vomiting should not prepare food for others.  · Wash your hands after using cutting boards, countertops, and knives that have been in contact with raw foods.  · Wash and then peel fruits and vegetables.  · Keep uncooked meats away from cooked and ready-to-eat foods.  · Use a food thermometer when cooking. Cook poultry to at least 165°F (74°C). Cook ground meat (beef, veal, pork, lamb) to at least 160°F (71°C). Cook fresh beef, veal, lamb, and pork to at least 145°F (63°C).  · Dont eat raw or undercooked eggs (poached or sathya side up), poultry, meat, or unpasteurized milk and juices.  Food and drinks  The main goal while treating vomiting or diarrhea is to prevent dehydration. This is done by taking small amounts of liquids often.  · Keep in mind that liquids are more important than food right now.  · Drink only small amounts of liquids at a time.  · Dont force yourself to eat, especially if you are having cramping, vomiting, or diarrhea. Dont eat large amounts at a time, even if you are hungry.  · If you eat, avoid fatty, greasy, spicy, or fried foods.  · Dont eat dairy foods or drink milk if you have diarrhea. These can make diarrhea worse.  During the first 24 hours you can try:  · Oral rehydration solutions. Do not use sports drinks. They have too much sugar and not enough electrolytes.  · Soft drinks without  caffeine  · Ginger ale  · Water (plain or flavored)  · Decaf tea or coffee  · Clear broth, consommé, or bouillon  · Gelatin, popsicles, or frozen fruit juice bars  The second 24 hours, if you are feeling better, you can add:  · Hot cereal, plain toast, bread, rolls, or crackers  · Plain noodles, rice, mashed potatoes, chicken noodle soup, or rice soup  · Unsweetened canned fruit (no pineapple)  · Bananas  As you recover:  · Limit fat intake to less than 15 grams per day. Dont eat margarine, butter, oils, mayonnaise, sauces, gravies, fried foods, peanut butter, meat, poultry, or fish.  · Limit fiber. Dont eat raw or cooked vegetables, fresh fruits except bananas, or bran cereals.  · Limit caffeine and chocolate.  · Limit dairy.  · Dont use spices or seasonings except salt.  · Go back to your normal diet over time, as you feel better and your symptoms improve.  · If the symptoms come back, go back to a simple diet or clear liquids.  Follow-up care  Follow up with your healthcare provider, or as advised. If a stool sample was taken or cultures were done, call the healthcare provider for the results as instructed.  Call 911  Call 911 if you have any of these symptoms:  · Trouble breathing  · Confusion  · Extreme drowsiness or trouble walking  · Loss of consciousness  · Rapid heart rate  · Chest pain  · Stiff neck  · Seizure  When to seek medical advice  Call your healthcare provider right away if any of these occur:  · Abdominal pain that gets worse  · Constant lower right abdominal pain  · Continued vomiting and inability to keep liquids down  · Diarrhea more than 5 times a day  · Blood in vomit or stool  · Dark urine or no urine for 8 hours, dry mouth and tongue, tiredness, weakness, or dizziness  · Drowsiness  · New rash  · You dont get better in 2 to 3 days  · Fever of 100.4°F (38°C) or higher that doesnt get lower with medicine  Date Last Reviewed: 1/3/2016  © 9349-9102 [employer]. [address] [place] [address]  [place] [address]. All rights reserved. This information is not intended as a substitute for professional medical care. Always follow your healthcare professional's instructions.

## 2018-10-23 ENCOUNTER — TELEPHONE (OUTPATIENT)
Dept: TRANSPLANT | Facility: CLINIC | Age: 58
End: 2018-10-23

## 2018-10-23 NOTE — TELEPHONE ENCOUNTER
"SW attempted to contact pt in regard to information provided below from pt's coordinator. Pt did not answer the phone and a VM was left. SW remains available.      SW received the following information via in basket prior to call:  Spoke with patient at length regarding her upcoming living kidney transplant date. Reiterated to Andra that her date is presently scheduled for 1/14/19. Reinforced that an earlier date may be possible, but could not be given at this time. Also explained to her that she cannot set a surgery date, and educated her on the donor process, and that the surgery dates are discussed with the donor first, based on availabilty . She became very upset , yelling that "if it was not for the incompetence of the doctor in the transplant clinic, I would have gotten my kidney October 15th, but because of that lady doctor, I have to wait. I do not ever want to see her again!" Patient then began crying, stating that "she will not go back on dialysis, no matter what". Coordinator sympathized with the fact that she had to postpone her transplant, but also that she would have to accept the current date of January 14th 2019 if a sooner date was not possible. Andra then stated tearfully that "it is what it is, maybe I wont even need a kidney."  Then abruptly hung up the phone.      Will have transplant social work assess her mental status and proceed per protocol.         ----- Message from Maureen Valle sent at 10/23/2018  1:59 PM CDT -----  Contact: patient  Needs Advice     Reason for call: pt would like to speak with someone concerning txp date         Communication Preference: [phone]     Additional Information: n/a     "

## 2018-10-24 ENCOUNTER — TELEPHONE (OUTPATIENT)
Dept: TRANSPLANT | Facility: CLINIC | Age: 58
End: 2018-10-24

## 2018-10-24 NOTE — TELEPHONE ENCOUNTER
Attempted to contact Any, phone would not ring. Will continue to call         ----- Message from Yuly Doll RN sent at 10/24/2018  2:33 PM CDT -----  Contact: Any Alvarez      ----- Message -----  From: Inga Sage  Sent: 10/24/2018   2:03 PM  To: Kidney Waitlisted Coordinator    Needs Advice    Reason for call: Called to discuss surgery date.        Communication Preference: [phone]    Additional Information: n/a

## 2018-10-25 ENCOUNTER — TELEPHONE (OUTPATIENT)
Dept: TRANSPLANT | Facility: CLINIC | Age: 58
End: 2018-10-25

## 2018-11-03 RX ORDER — LORATADINE 10 MG/1
TABLET ORAL
Qty: 90 TABLET | Refills: 0 | OUTPATIENT
Start: 2018-11-03

## 2018-11-05 ENCOUNTER — LAB VISIT (OUTPATIENT)
Dept: LAB | Facility: HOSPITAL | Age: 58
End: 2018-11-05
Attending: NURSE PRACTITIONER
Payer: MEDICARE

## 2018-11-05 DIAGNOSIS — Z76.82 AWAITING ORGAN TRANSPLANT STATUS: ICD-10-CM

## 2018-11-05 PROCEDURE — 99001 SPECIMEN HANDLING PT-LAB: CPT | Mod: PO,TXP

## 2018-11-19 ENCOUNTER — OFFICE VISIT (OUTPATIENT)
Dept: FAMILY MEDICINE | Facility: CLINIC | Age: 58
End: 2018-11-19
Payer: MEDICARE

## 2018-11-19 VITALS
RESPIRATION RATE: 14 BRPM | DIASTOLIC BLOOD PRESSURE: 78 MMHG | HEIGHT: 65 IN | HEART RATE: 83 BPM | TEMPERATURE: 99 F | WEIGHT: 230.19 LBS | BODY MASS INDEX: 38.35 KG/M2 | SYSTOLIC BLOOD PRESSURE: 142 MMHG | OXYGEN SATURATION: 98 %

## 2018-11-19 DIAGNOSIS — J20.9 ACUTE BRONCHITIS, UNSPECIFIED ORGANISM: ICD-10-CM

## 2018-11-19 DIAGNOSIS — R05.9 COUGH: Primary | ICD-10-CM

## 2018-11-19 DIAGNOSIS — I10 ESSENTIAL HYPERTENSION: ICD-10-CM

## 2018-11-19 PROCEDURE — 99214 OFFICE O/P EST MOD 30 MIN: CPT | Mod: ,,, | Performed by: NURSE PRACTITIONER

## 2018-11-19 RX ORDER — PROMETHAZINE HYDROCHLORIDE AND DEXTROMETHORPHAN HYDROBROMIDE 6.25; 15 MG/5ML; MG/5ML
5 SYRUP ORAL EVERY 6 HOURS PRN
Qty: 118 ML | Refills: 0 | Status: SHIPPED | OUTPATIENT
Start: 2018-11-19 | End: 2018-11-24

## 2018-11-19 RX ORDER — PREDNISONE 20 MG/1
TABLET ORAL
COMMUNITY
Start: 2018-10-31 | End: 2019-01-03

## 2018-11-19 RX ORDER — AZITHROMYCIN 250 MG/1
TABLET, FILM COATED ORAL
COMMUNITY
Start: 2018-11-17 | End: 2019-01-03

## 2018-11-19 NOTE — PATIENT INSTRUCTIONS
Bronchitis, Viral (Adult)    You have a viral bronchitis. Bronchitis is inflammation and swelling of the lining of the lungs. This is often caused by an infection. Symptoms include a dry, hacking cough that is worse at night. The cough may bring up yellow-green mucus. You may also feel short of breath or wheeze. Other symptoms may include tiredness, chest discomfort, and chills.  Bronchitis that is caused by a virus is not treated with antibiotics. Instead, medicines may be given to help relieve symptoms. Symptoms can last up to 2 weeks, although the cough may last much longer.  This illness is contagious during the first few days and is spread through the air by coughing and sneezing, or by direct contact (touching the sick person and then touching your own eyes, nose, or mouth).  Most viral illnesses resolve within 10 to 14 days with rest and simple home remedies, although they may sometimes last for several weeks.  Home care  · If symptoms are severe, rest at home for the first 2 to 3 days. When you go back to your usual activities, don't let yourself get too tired.  · Do not smoke. Also avoid being exposed to secondhand smoke.  · You may use over-the-counter medicine to control fever or pain, unless another pain medicine was prescribed. (Note: If you have chronic liver or kidney disease or have ever had a stomach ulcer or gastrointestinal bleeding, talk with your healthcare provider before using these medicines. Also talk to your provider if you are taking medicine to prevent blood clots.) Aspirin should never be given to anyone younger than 18 years of age who is ill with a viral infection or fever. It may cause severe liver or brain damage.  · Your appetite may be poor, so a light diet is fine. Avoid dehydration by drinking 6 to 8 glasses of fluids per day (such as water, soft drinks, sports drinks, juices, tea, or soup). Extra fluids will help loosen secretions in the nose and lungs.  · Over-the-counter  cough, cold, and sore-throat medicines will not shorten the length of the illness, but they may help to reduce symptoms. (Note: Do not use decongestants if you have high blood pressure.)  Follow-up care  Follow up with your healthcare provider, or as advised. If you had an X-ray or ECG (electrocardiogram), a specialist will review it. You will be notified of any new findings that may affect your care.  Note: If you are age 65 or older, or if you have a chronic lung disease or condition that affects your immune system, or you smoke, talk to your healthcare provider about having pneumococcal vaccinations and a yearly influenza vaccination (flu shot).  When to seek medical advice  Call your healthcare provider right away if any of these occur:  · Fever of 100.4°F (38°C) or higher  · Coughing up increased amounts of colored sputum  · Weakness, drowsiness, headache, facial pain, ear pain, or a stiff neck  Call 911, or get immediate medical care  Contact emergency services right away if any of these occur:  · Coughing up blood  · Worsening weakness, drowsiness, headache, or stiff neck  · Trouble breathing, wheezing, or pain with breathing  Date Last Reviewed: 9/13/2015  © 2889-0633 [employer]. [address] [place] [address], [place] [address]. All rights reserved. This information is not intended as a substitute for professional medical care. Always follow your healthcare professional's instructions.

## 2018-11-19 NOTE — PROGRESS NOTES
SUBJECTIVE:      Patient ID: Andra Newell is a 58 y.o. female.    Chief Complaint: Follow-up (follow up from urgent care )    Sick visit - went to  but not feeling much better, worried about holiday weekend coming up & getting worse    Transplant surgery was postponed due to GI upset from meds, hoping by the beginning of next year surgery will be rescheduled      Sinus Problem   This is a new problem. The current episode started in the past 7 days. The problem has been gradually worsening since onset. There has been no fever. Associated symptoms include congestion, coughing, headaches, a hoarse voice, shortness of breath, sneezing, a sore throat and swollen glands. Pertinent negatives include no chills, diaphoresis, ear pain, neck pain or sinus pressure. Past treatments include antibiotics (tessalon). The treatment provided mild relief.   Hypertension   This is a chronic problem. The current episode started more than 1 year ago. The problem is controlled. Associated symptoms include headaches and shortness of breath. Pertinent negatives include no anxiety, blurred vision, chest pain, malaise/fatigue, neck pain, orthopnea, palpitations, peripheral edema, PND or sweats. Agents associated with hypertension include thyroid hormones and steroids. Risk factors for coronary artery disease include obesity, post-menopausal state, stress, sedentary lifestyle, dyslipidemia, diabetes mellitus and family history. Past treatments include diuretics and ACE inhibitors. The current treatment provides mild improvement. Compliance problems include exercise and diet.  Identifiable causes of hypertension include chronic renal disease, a hypertension causing med, renovascular disease and a thyroid problem.       Past Surgical History:   Procedure Laterality Date    ACHILLES TENDON SURGERY Left May 2015    ARTHROSCOPY, HIP Left 10/3/2013    [facility]    ARTHROSCOPY-HIP WITH TROCHANTERIC  BURSECTOMY Left 10/3/2013    [facility]    ARTHROSCOPY-SHOULDER Left 11/24/2015    [facility]    ARTHROSCOPY-SHOULDER WITH SUBACROMIAL DECOMPRESSION Left 7/12/2016    [facility] OR    BACK SURGERY      CHOLECYSTECTOMY      COLONOSCOPY N/A 9/6/2017    Procedure: COLONOSCOPY;  Surgeon: Marisol Bryson MD;  Location[facility];  Service: Endoscopy;  Laterality: N/A; REPEAT IN 3 YEARS FOR SURVEILLANCE    COLONOSCOPY N/A 9/6/2017    [facility]    DEBRIDEMENT-SHOULDER-ARTHROSCOPIC Left 7/12/2016    [facility] OR    DEBRIDEMENT-SHOULDER-ARTHROSCOPIC; LABRAL Left 11/24/2015    [facility] OR    DIALYSIS FISTULA CREATION  12/2017    FEMOROPLASTY, ARTHROSCOPIC Left 10/3/2013    [facility] OR    HEMORRHOID SURGERY      INJECTION-AMNIOX Left 7/12/2016    [facility] OR    JOINT REPLACEMENT      left    KNEE SURGERY      LYSIS-ADHESION Left 11/24/2015    [facility] OR    REPAIR ROTATOR CUFF ARTHROSCOPIC Left 7/12/2016    [facility] OR    REPAIR-TENDON-BICEP Left 11/24/2015    [facility] OR    SHOULDER ARTHROSCOPY      SHOULDER SURGERY      UPPER GASTROINTESTINAL ENDOSCOPY  ~2013     [facility]     Family History   Problem Relation Age of Onset    Diabetes Mother     Alzheimer's disease Mother     Thyroid disease Mother     Hyperlipidemia Mother     Heart disease Father     Stroke Father     Diabetes Sister     Lupus Sister     Ovarian cancer Sister     Heart disease Brother     No Known Problems Son     Diabetes Maternal Grandmother     Hypertension Maternal Grandmother     No Known Problems Paternal Grandmother     No Known Problems Paternal Grandfather     COPD  Maternal Aunt     Diabetes Maternal Aunt     Heart disease Maternal Aunt     Hypertension Maternal Aunt     No Known Problems Maternal Uncle     No Known Problems Paternal Aunt     No Known Problems Paternal Uncle     Colon cancer Neg Hx     Colon polyps Neg Hx     Crohn's disease Neg Hx     Ulcerative colitis Neg Hx     Celiac disease Neg Hx       Social History     Socioeconomic History    Marital status: Significant Other     Spouse name: None    Number of children: 2    Years of education: None    Highest education level: None   Social Needs    Financial resource strain: None    Food insecurity - worry: None    Food insecurity - inability: None    Transportation needs - medical: None    Transportation needs - non-medical: None   Occupational History    Occupation: retired     Comment:    Tobacco Use    Smoking status: Never Smoker    Smokeless tobacco: Never Used   Substance and Sexual Activity    Alcohol use: No    Drug use: No    Sexual activity: No   Other Topics Concern    None   Social History Narrative    None     Current Outpatient Medications   Medication Sig Dispense Refill    atorvastatin (LIPITOR) 40 MG tablet Take 1 tablet (40 mg total) by mouth once daily. (Patient taking differently: Take 40 mg by mouth every morning. ) 90 tablet 1    azithromycin (Z-LIU) 250 MG tablet       biotin 10,000 mcg Cap Take 1 capsule by mouth once daily. Hair loss      calcitRIOL (ROCALTROL) 0.25 MCG Cap Take 0.25 mcg by mouth every morning.       famotidine (PEPCID) 20 MG tablet Take 1 tablet (20 mg total) by mouth nightly as needed for Heartburn. (Patient taking differently: Take 20 mg by mouth every morning. ) 30 tablet 5    hydrALAZINE (APRESOLINE) 25 MG tablet Take 25 mg by mouth every morning.       insulin glargine (BASAGLAR KWIKPEN U-100 INSULIN) 100 unit/mL (3 mL) InPn pen Inject 80 Units into the skin 2 (two) times daily.      levothyroxine (SYNTHROID) 75  "MCG tablet Take 1 tablet (75 mcg total) by mouth once daily. (Patient taking differently: Take 75 mcg by mouth before breakfast. ) 30 tablet 5    lisinopril 10 MG tablet Take 10 mg by mouth every morning.       loratadine (CLARITIN) 10 mg tablet Take 1 tablet (10 mg total) by mouth once daily. (Patient taking differently: Take 10 mg by mouth every morning. ) 90 tablet 1    LORazepam (ATIVAN) 2 MG Tab Take 1 tablet (2 mg total) by mouth 2 (two) times daily. 60 tablet 2    pen needle, diabetic (BD ULTRA-FINE MINI PEN NEEDLE) 31 gauge x 3/16" Ndle 1 each by Misc.(Non-Drug; Combo Route) route once daily. 100 each 1    predniSONE (DELTASONE) 20 MG tablet       vilazodone (VIIBRYD) 40 mg Tab tablet Take 1 tablet (40 mg total) by mouth once daily. (Patient taking differently: Take 40 mg by mouth every morning. ) 30 tablet 5    promethazine-dextromethorphan (PROMETHAZINE-DM) 6.25-15 mg/5 mL Syrp Take 5 mLs by mouth every 6 (six) hours as needed. 118 mL 0     No current facility-administered medications for this visit.      Review of patient's allergies indicates:   Allergen Reactions    Codeine Itching     Can take oxycodone and hydrocodone with Benadryl      Past Medical History:   Diagnosis Date    Anemia     Anemia in chronic kidney disease, on chronic dialysis 7/12/2017    Arthritis     Asthma     allergic airway    Colon polyp     Depression     Diabetes mellitus     Diverticulosis     Eosinophilia     ESRD (end stage renal disease)     stage V, due for living donor 9/2018    ESRD on dialysis     GERD (gastroesophageal reflux disease)     Gout     Gout     Hyperlipidemia     Hypertension     Low back pain     MRSA carrier     Obesity     Renal disorder     Rotator cuff syndrome--left     Thyroid disease     Ulnar neuropathy of right upper extremity     Uncontrolled type 2 diabetes mellitus with hyperglycemia      Past Surgical History:   Procedure Laterality Date    ACHILLES TENDON " SURGERY Left May 2015    ARTHROSCOPY, HIP Left 10/3/2013    [facility] OR    ARTHROSCOPY-HIP WITH TROCHANTERIC BURSECTOMY Left 10/3/2013    [facility] OR    ARTHROSCOPY-SHOULDER Left 11/24/2015    [facility] OR    ARTHROSCOPY-SHOULDER WITH SUBACROMIAL DECOMPRESSION Left 7/12/2016    [facility] OR    BACK SURGERY      CHOLECYSTECTOMY      COLONOSCOPY N/A 9/6/2017    Procedure: COLONOSCOPY;  Surgeon: Marisol Bryson MD;  Location: [facility] ENDO;  Service: Endoscopy;  Laterality: N/A; REPEAT IN 3 YEARS FOR SURVEILLANCE    COLONOSCOPY N/A 9/6/2017    [facility] ENDO    DEBRIDEMENT-SHOULDER-ARTHROSCOPIC Left 7/12/2016    [facility] OR    DEBRIDEMENT-SHOULDER-ARTHROSCOPIC; LABRAL Left 11/24/2015    [facility] OR    DIALYSIS FISTULA CREATION  12/2017    FEMOROPLASTY, ARTHROSCOPIC Left 10/3/2013    [facility] OR    HEMORRHOID SURGERY      INJECTION-AMNIOX Left 7/12/2016    [facility] OR    JOINT REPLACEMENT      left    KNEE SURGERY      LYSIS-ADHESION Left 11/24/2015    [facility]dez [facility] at [facility] OR    REPAIR ROTATOR CUFF ARTHROSCOPIC Left 7/12/2016    [facility] OR    REPAIR-TENDON-BICEP Left 11/24/2015    [facility] OR    SHOULDER ARTHROSCOPY      SHOULDER SURGERY      UPPER GASTROINTESTINAL ENDOSCOPY  ~2013     [facility]       Review of Systems   Constitutional: Negative for activity change, appetite change, chills, diaphoresis, fatigue, malaise/fatigue and unexpected weight change.   HENT: Positive for congestion, hoarse voice, postnasal drip, sneezing and sore throat. Negative for ear pain, hearing loss, sinus pressure, sinus pain and trouble swallowing.    Eyes:  "Negative for blurred vision, photophobia and pain.   Respiratory: Positive for cough and shortness of breath. Negative for chest tightness and wheezing.    Cardiovascular: Negative for chest pain, palpitations, orthopnea, leg swelling and PND.   Gastrointestinal: Negative for abdominal distention, abdominal pain, blood in stool, constipation, diarrhea, nausea and vomiting.   Endocrine: Negative for cold intolerance, heat intolerance, polydipsia and polyuria.   Genitourinary: Negative for difficulty urinating, dysuria, flank pain, frequency, hematuria, pelvic pain and urgency.   Musculoskeletal: Negative for arthralgias, back pain, joint swelling, myalgias and neck pain.   Skin: Negative for pallor.   Allergic/Immunologic: Negative for environmental allergies and food allergies.   Neurological: Positive for headaches. Negative for dizziness, weakness, light-headedness and numbness.   Hematological: Does not bruise/bleed easily.   Psychiatric/Behavioral: Negative for agitation, confusion, decreased concentration and sleep disturbance. The patient is not nervous/anxious.       OBJECTIVE:      Vitals:    11/19/18 1613 11/19/18 1621 11/19/18 1659   BP: (!) 160/88 (!) 140/90 (!) 142/78   Pulse: 83     Resp: 14     Temp: 98.8 °F (37.1 °C)     SpO2: 98%     Weight: 104.4 kg (230 lb 3.2 oz)     Height: 5' 5" (1.651 m)       Physical Exam   Constitutional: She is oriented to person, place, and time. Vital signs are normal. She appears well-developed and well-nourished. No distress.   obese   HENT:   Head: Normocephalic and atraumatic.   Right Ear: Hearing, tympanic membrane, external ear and ear canal normal.   Left Ear: Hearing, tympanic membrane, external ear and ear canal normal.   Nose: Mucosal edema present. No rhinorrhea. Right sinus exhibits no maxillary sinus tenderness and no frontal sinus tenderness. Left sinus exhibits no maxillary sinus tenderness and no frontal sinus tenderness.   Mouth/Throat: Uvula is midline " and mucous membranes are normal. Posterior oropharyngeal erythema (clear mucus) present.   Eyes: Conjunctivae and lids are normal. Pupils are equal, round, and reactive to light. Right eye exhibits no discharge. Left eye exhibits no discharge. Right conjunctiva is not injected. Left conjunctiva is not injected. Right pupil is round and reactive. Left pupil is round and reactive. Pupils are equal.   Neck: Trachea normal and normal range of motion. Neck supple. No JVD present. No tracheal deviation present. No thyromegaly present.   Cardiovascular: Normal rate, regular rhythm, normal heart sounds and intact distal pulses. Exam reveals no gallop and no friction rub.   No murmur heard.  Pulses:       Radial pulses are 2+ on the right side, and 2+ on the left side.   Pulmonary/Chest: Effort normal. No stridor. No respiratory distress. She has decreased breath sounds in the right lower field and the left lower field. She has no wheezes. She has no rhonchi. She has no rales.   Abdominal: Soft. Bowel sounds are normal. She exhibits no distension. There is no tenderness. There is no rigidity and no guarding.   Musculoskeletal: Normal range of motion. She exhibits no edema.   Lymphadenopathy:     She has no cervical adenopathy.   Neurological: She is alert and oriented to person, place, and time. She has normal strength. She displays no atrophy. She displays a negative Romberg sign. Coordination and gait normal.   Skin: Skin is warm and dry. Capillary refill takes less than 2 seconds. No lesion and no rash noted. No cyanosis. No pallor.   Psychiatric: She has a normal mood and affect. Her speech is normal and behavior is normal. Judgment and thought content normal. Cognition and memory are normal. She is attentive.   Nursing note and vitals reviewed.     Assessment:       1. Cough    2. Acute bronchitis, unspecified organism    3. Essential hypertension    4. BMI 38.0-38.9,adult        Plan:       Cough  -      promethazine-dextromethorphan (PROMETHAZINE-DM) 6.25-15 mg/5 mL Syrp; Take 5 mLs by mouth every 6 (six) hours as needed.  Dispense: 118 mL; Refill: 0    Acute bronchitis, unspecified organism  -     promethazine-dextromethorphan (PROMETHAZINE-DM) 6.25-15 mg/5 mL Syrp; Take 5 mLs by mouth every 6 (six) hours as needed.  Dispense: 118 mL; Refill: 0    Essential hypertension   -stable    BMI 38.0-38.9,adult   -The patient is asked to make an attempt to improve diet and exercise patterns to aid in medical management of this problem.        Follow-up if symptoms worsen or fail to improve.      11/23/2018 REBECCA Buck, FNP-C

## 2018-11-29 ENCOUNTER — TELEPHONE (OUTPATIENT)
Dept: TRANSPLANT | Facility: CLINIC | Age: 58
End: 2018-11-29

## 2018-12-03 RX ORDER — LORATADINE 10 MG/1
TABLET ORAL
Qty: 90 TABLET | Refills: 0 | OUTPATIENT
Start: 2018-12-03

## 2018-12-04 ENCOUNTER — TELEPHONE (OUTPATIENT)
Dept: SPORTS MEDICINE | Facility: CLINIC | Age: 58
End: 2018-12-04

## 2018-12-04 NOTE — ED NOTES
Report called to ([facility] 2nd Floor re: pending admit. Stable   Telephone Encounter by Laura Perez NCMA at 07/21/18 11:08 AM     Author:  Laura Perez NCMA Service:  (none) Author Type:  Certified Medical Assistant     Filed:  07/21/18 11:08 AM Encounter Date:  7/20/2018 Status:  Signed     :  Laura Perez NCMA (Certified Medical Assistant)       From: Ian Mack  To: Ameya Davis MD  Sent: 7/20/2018  2:18 PM CDT  Subject: After-Visit Questions    Hello Dr Davis,    Since last visit, I am taking Singulair at bedtime. I am also taking   Ventolin and Symbicort. Symbicort 2 puffs, 2 times a day. I finished the   Medrol dose pack as prescribed. I still have symptoms. Ventolin provides   immediate relief for a while but I have to take it again in a few hours.   Laughing and talking long sentences causes cough. Deep breathing tickles   the inside of my chest.    What should be the next steps?     Thank you Ian Baum       Revision History        Date/Time User Provider Type Action    > 07/21/18 11:08 AM Laura Perez NCMA Certified Medical Assistant Sign    Attribution information within the note text is not available.

## 2018-12-04 NOTE — TELEPHONE ENCOUNTER
Received disability paperwork from [employer]. Forms completed and faxed back to them at [phone].    Maria Snider MA  [facility]

## 2018-12-09 ENCOUNTER — TELEPHONE (OUTPATIENT)
Dept: TRANSPLANT | Facility: CLINIC | Age: 58
End: 2018-12-09

## 2018-12-10 NOTE — TELEPHONE ENCOUNTER
Renal Transplant Attending Recipient Chart Review Note:    58 y.o. female with history of CKD secondary to DM/HTN +/- antibiotic induced nephrotoxicity who was on HD from 4/13/18 until beginning of Sept 2018; last seen in pre-op on 10/9/18 but surgery canceled secondary to patient having diarrhea and committee decision to have her see GI and have infectious work-up. She was seen by GI on 10/16/18 and diarrhea felt to be secondary to torsemide. We reviewed this patient on 8/28/18 and this review occurred on 11/29/18.    Note from transplant coordinator dated 10/23/18 reviewed and given statements recorded in the note need to have SW contact the patient to assess her mental status. SW Mora Macedo attempted to contact patient on 10/23/18 and left VM but does not seem to have been able to speak with her.     She was seen in urgent care clinic vs PCP for cough, SOB, and headache for which the patient received a Z-baldo and per transplant coordinator feels better.     Labs (11/5/18) --> Hb 10.7; WBC 8.5, platelet count 207; PTH 97; albumin 3.6; potassium 5.3; BUN 56; Cr 2.9; eGFR 16.9    Need to confirm BMI    Anahy Weiss MD  Renal Transplant Attending      ----- Message from Conrad Shea RN sent at 11/30/2018  1:44 PM CST -----  Per review on 11/29/18:    Last reviewed 8/28/18.  58 year old white female with CKD off HD since 9/2018    1.  BMI over 38  2.  Recent GFR is 16.9  3.  Last PTH 97  4.  SW to contact patient

## 2018-12-17 ENCOUNTER — TELEPHONE (OUTPATIENT)
Dept: TRANSPLANT | Facility: CLINIC | Age: 58
End: 2018-12-17

## 2018-12-17 DIAGNOSIS — Z76.82 AWAITING ORGAN TRANSPLANT STATUS: Primary | ICD-10-CM

## 2018-12-17 DIAGNOSIS — Z76.82 ORGAN TRANSPLANT CANDIDATE: ICD-10-CM

## 2018-12-17 NOTE — TELEPHONE ENCOUNTER
Spoke with DR Heaton's office. Confirmed that Andra remains off of dialysis at this time and the last labs drawn were 11/5/18. Her next set of labs are due March 2019.

## 2018-12-21 ENCOUNTER — TELEPHONE (OUTPATIENT)
Dept: TRANSPLANT | Facility: CLINIC | Age: 58
End: 2018-12-21

## 2018-12-21 VITALS — BODY MASS INDEX: 38.27 KG/M2 | WEIGHT: 230 LBS

## 2018-12-21 NOTE — TELEPHONE ENCOUNTER
SW returned pt's call. SW followed up with pt about pt's distressing remarks surrounding the scheduling of her surgery. Pt reports that she understood that the surgery had to be pushed back due to conflicts with the donor's schedule. She reports that she is ready and prepared for surgery. Pt reports that she is requesting not to have her pre-op appointment on Jan. 7th because she is seeing her psychiatrist at 3pm. SW explained that SW would relay that information. Pt reports that she has been seeing Dr. Sheth ([phone]) for the last 2-3 years and she has been adherent with her medications. Pt provided permission for the team to have open communication with Dr. Sheth in order to provide pt the best post transplant care.    SW relayed this information to pt's coordinator and living donor coordinator. OPAL remains available to pt, pt's family, and transplant team at [phone].        ----- Message from Inga Sage sent at 12/21/2018  3:07 PM CST -----  Contact: Patient  Patient Returning Call from [facility]    Who Left Message for Patient: Stephie Meléndez  Communication Preference: [phone]  Additional Information: n/a

## 2018-12-21 NOTE — TELEPHONE ENCOUNTER
OPAL attempted to contact pt regarding some concerns about comments she made to her coordinator over the phone about having to reschedule her surgery. OPAL called pt and it rang and then went to voicemail. SW unable to leave a message due to her voicemail box being full. OPAL then called pt's emergency contact/significant other: Any Alvarez at the number listed in pt's chart ([phone]) and was able to leave a voicemail. OPAL awaits a return call. OPAL remains available to pt, pt's family, and transplant team at [phone].

## 2018-12-24 ENCOUNTER — TELEPHONE (OUTPATIENT)
Dept: TRANSPLANT | Facility: CLINIC | Age: 58
End: 2018-12-24

## 2018-12-24 ENCOUNTER — TELEPHONE (OUTPATIENT)
Dept: PREADMISSION TESTING | Facility: HOSPITAL | Age: 58
End: 2018-12-24

## 2018-12-24 DIAGNOSIS — Z76.82 ORGAN TRANSPLANT CANDIDATE: Primary | ICD-10-CM

## 2018-12-24 NOTE — TELEPHONE ENCOUNTER
----- Message from Era Aguirre sent at 12/24/2018  9:05 AM CST -----  No Need to Call Pt.  Please schedule this patient to Boris's schedule at 08:00 AM on January 3rd.   She is the kidney recipient.  Surgery Date is 01/14/2018. Her surgeon will be Dr. Salazar.    Thanks:  Era

## 2018-12-24 NOTE — TELEPHONE ENCOUNTER
Left vm to confirm surgery for 1/14/19.  Pre op will be on 1/3/19.  Final cxm will be scheduled for 1/9/19.  Will attempt to contact patient on 12/27/18.

## 2018-12-27 ENCOUNTER — TELEPHONE (OUTPATIENT)
Dept: TRANSPLANT | Facility: CLINIC | Age: 58
End: 2018-12-27

## 2018-12-27 NOTE — TELEPHONE ENCOUNTER
Confirmed surgery for 1/14/19 and pre-op on 1/3/19.   Final cxm scheduled for 1/9/19.  Denies any blood thinners or hormone use at this time.  Discussed possibility of cancellation due to a busy service.  Denies need for FMLA forms.  Denies any change in medical condition since last clinic visit.  Discussed the need to inform team of any changes in medical condition.   All questions answered and patient verbalized understanding.

## 2018-12-28 ENCOUNTER — TELEPHONE (OUTPATIENT)
Dept: PREADMISSION TESTING | Facility: HOSPITAL | Age: 58
End: 2018-12-28

## 2018-12-28 NOTE — TELEPHONE ENCOUNTER
----- Message from Era Aguirre sent at 12/28/2018  7:56 AM CST -----  Regarding: Need Pre-Op Clear Appt.  No need to call patient.  Can you put this patient on [facility]'s schedule on 01/07/2019 at 09:00 am.    Surgery Date is 01/14/2019  Surgeon is Dr. Salazar.     Thanks:  Era

## 2019-01-01 DIAGNOSIS — K21.9 GASTROESOPHAGEAL REFLUX DISEASE WITHOUT ESOPHAGITIS: ICD-10-CM

## 2019-01-01 DIAGNOSIS — E03.9 ACQUIRED HYPOTHYROIDISM: ICD-10-CM

## 2019-01-01 DIAGNOSIS — E11.65 UNCONTROLLED TYPE 2 DIABETES MELLITUS WITH HYPERGLYCEMIA, WITH LONG-TERM CURRENT USE OF INSULIN: ICD-10-CM

## 2019-01-01 DIAGNOSIS — F32.9 REACTIVE DEPRESSION: ICD-10-CM

## 2019-01-01 DIAGNOSIS — Z79.4 UNCONTROLLED TYPE 2 DIABETES MELLITUS WITH HYPERGLYCEMIA, WITH LONG-TERM CURRENT USE OF INSULIN: ICD-10-CM

## 2019-01-01 RX ORDER — ATORVASTATIN CALCIUM 40 MG/1
TABLET, FILM COATED ORAL
Qty: 90 TABLET | Refills: 0 | Status: SHIPPED | OUTPATIENT
Start: 2019-01-01 | End: 2019-04-02

## 2019-01-01 RX ORDER — VILAZODONE HYDROCHLORIDE 40 MG/1
TABLET ORAL
Qty: 30 TABLET | Refills: 4 | Status: SHIPPED | OUTPATIENT
Start: 2019-01-01 | End: 2019-06-10 | Stop reason: SDUPTHER

## 2019-01-01 RX ORDER — LEVOTHYROXINE SODIUM 75 UG/1
TABLET ORAL
Qty: 30 TABLET | Refills: 4 | Status: SHIPPED | OUTPATIENT
Start: 2019-01-01 | End: 2019-06-19 | Stop reason: SDUPTHER

## 2019-01-01 RX ORDER — FAMOTIDINE 20 MG/1
20 TABLET, FILM COATED ORAL NIGHTLY PRN
Qty: 30 TABLET | Refills: 4 | Status: SHIPPED | OUTPATIENT
Start: 2019-01-01 | End: 2019-01-04 | Stop reason: SDUPTHER

## 2019-01-01 RX ORDER — LISINOPRIL 10 MG/1
TABLET ORAL
Qty: 90 TABLET | Refills: 0 | Status: SHIPPED | OUTPATIENT
Start: 2019-01-01 | End: 2019-01-03

## 2019-01-02 ENCOUNTER — LAB VISIT (OUTPATIENT)
Dept: LAB | Facility: HOSPITAL | Age: 59
End: 2019-01-02
Attending: INTERNAL MEDICINE
Payer: MEDICARE

## 2019-01-02 DIAGNOSIS — Z76.82 ORGAN TRANSPLANT CANDIDATE: ICD-10-CM

## 2019-01-02 DIAGNOSIS — Z76.82 AWAITING ORGAN TRANSPLANT STATUS: ICD-10-CM

## 2019-01-02 LAB
ALBUMIN SERPL BCP-MCNC: 3.7 G/DL
ANION GAP SERPL CALC-SCNC: 7 MMOL/L
BASOPHILS # BLD AUTO: 0.05 K/UL
BASOPHILS NFR BLD: 0.5 %
BUN SERPL-MCNC: 80 MG/DL
CALCIUM SERPL-MCNC: 9.2 MG/DL
CHLORIDE SERPL-SCNC: 119 MMOL/L
CO2 SERPL-SCNC: 18 MMOL/L
CREAT SERPL-MCNC: 3.6 MG/DL
DIFFERENTIAL METHOD: ABNORMAL
EOSINOPHIL # BLD AUTO: 0.3 K/UL
EOSINOPHIL NFR BLD: 3.1 %
ERYTHROCYTE [DISTWIDTH] IN BLOOD BY AUTOMATED COUNT: 13.1 %
EST. GFR  (AFRICAN AMERICAN): 15 ML/MIN/1.73 M^2
EST. GFR  (NON AFRICAN AMERICAN): 13 ML/MIN/1.73 M^2
GLUCOSE SERPL-MCNC: 90 MG/DL
HCT VFR BLD AUTO: 36.6 %
HGB BLD-MCNC: 11.3 G/DL
IMM GRANULOCYTES # BLD AUTO: 0.03 K/UL
IMM GRANULOCYTES NFR BLD AUTO: 0.3 %
LYMPHOCYTES # BLD AUTO: 1.7 K/UL
LYMPHOCYTES NFR BLD: 16.5 %
MCH RBC QN AUTO: 30.5 PG
MCHC RBC AUTO-ENTMCNC: 30.9 G/DL
MCV RBC AUTO: 99 FL
MONOCYTES # BLD AUTO: 0.6 K/UL
MONOCYTES NFR BLD: 5.5 %
NEUTROPHILS # BLD AUTO: 7.5 K/UL
NEUTROPHILS NFR BLD: 74.1 %
NRBC BLD-RTO: 0 /100 WBC
PHOSPHATE SERPL-MCNC: 4.6 MG/DL
PLATELET # BLD AUTO: 199 K/UL
PMV BLD AUTO: 10.2 FL
POTASSIUM SERPL-SCNC: 5.1 MMOL/L
PTH-INTACT SERPL-MCNC: 145 PG/ML
RBC # BLD AUTO: 3.7 M/UL
SODIUM SERPL-SCNC: 144 MMOL/L
WBC # BLD AUTO: 10.05 K/UL

## 2019-01-02 PROCEDURE — 83970 ASSAY OF PARATHORMONE: CPT | Mod: TXP

## 2019-01-02 PROCEDURE — 85025 COMPLETE CBC W/AUTO DIFF WBC: CPT | Mod: TXP

## 2019-01-02 PROCEDURE — 80069 RENAL FUNCTION PANEL: CPT | Mod: TXP

## 2019-01-02 PROCEDURE — 36415 COLL VENOUS BLD VENIPUNCTURE: CPT | Mod: PO,TXP

## 2019-01-02 RX ORDER — PEN NEEDLE, DIABETIC 31 GX5/16"
NEEDLE, DISPOSABLE MISCELLANEOUS
Qty: 100 EACH | Refills: 0 | Status: SHIPPED | OUTPATIENT
Start: 2019-01-02 | End: 2019-03-06 | Stop reason: SDUPTHER

## 2019-01-02 RX ORDER — LORATADINE 10 MG/1
TABLET ORAL
Qty: 90 TABLET | Refills: 0 | Status: ON HOLD | OUTPATIENT
Start: 2019-01-02 | End: 2019-02-03 | Stop reason: ALTCHOICE

## 2019-01-03 ENCOUNTER — CLINICAL SUPPORT (OUTPATIENT)
Dept: TRANSPLANT | Facility: CLINIC | Age: 59
End: 2019-01-03
Payer: MEDICARE

## 2019-01-03 ENCOUNTER — SOCIAL WORK (OUTPATIENT)
Dept: TRANSPLANT | Facility: CLINIC | Age: 59
End: 2019-01-03
Payer: MEDICARE

## 2019-01-03 ENCOUNTER — HOSPITAL ENCOUNTER (OUTPATIENT)
Dept: RADIOLOGY | Facility: HOSPITAL | Age: 59
Discharge: HOME | End: 2019-01-03
Attending: NURSE PRACTITIONER
Payer: MEDICARE

## 2019-01-03 ENCOUNTER — HOSPITAL ENCOUNTER (OUTPATIENT)
Dept: CARDIOLOGY | Facility: CLINIC | Age: 59
Discharge: HOME | End: 2019-01-03
Payer: MEDICARE

## 2019-01-03 ENCOUNTER — OFFICE VISIT (OUTPATIENT)
Dept: TRANSPLANT | Facility: CLINIC | Age: 59
End: 2019-01-03
Payer: MEDICARE

## 2019-01-03 VITALS
TEMPERATURE: 98 F | SYSTOLIC BLOOD PRESSURE: 179 MMHG | OXYGEN SATURATION: 98 % | DIASTOLIC BLOOD PRESSURE: 85 MMHG | DIASTOLIC BLOOD PRESSURE: 85 MMHG | WEIGHT: 226.63 LBS | RESPIRATION RATE: 18 BRPM | HEART RATE: 80 BPM | HEIGHT: 66 IN | SYSTOLIC BLOOD PRESSURE: 179 MMHG | HEART RATE: 80 BPM | HEIGHT: 66 IN | BODY MASS INDEX: 36.42 KG/M2 | WEIGHT: 226.63 LBS | HEART RATE: 80 BPM | WEIGHT: 226.63 LBS | OXYGEN SATURATION: 98 % | RESPIRATION RATE: 18 BRPM | SYSTOLIC BLOOD PRESSURE: 179 MMHG | TEMPERATURE: 98 F | OXYGEN SATURATION: 98 % | HEIGHT: 66 IN | BODY MASS INDEX: 36.42 KG/M2 | TEMPERATURE: 98 F | BODY MASS INDEX: 36.42 KG/M2 | RESPIRATION RATE: 18 BRPM | DIASTOLIC BLOOD PRESSURE: 85 MMHG

## 2019-01-03 VITALS
OXYGEN SATURATION: 98 % | SYSTOLIC BLOOD PRESSURE: 179 MMHG | RESPIRATION RATE: 18 BRPM | HEART RATE: 80 BPM | DIASTOLIC BLOOD PRESSURE: 85 MMHG | WEIGHT: 226.63 LBS | TEMPERATURE: 98 F | BODY MASS INDEX: 36.42 KG/M2 | HEIGHT: 66 IN

## 2019-01-03 DIAGNOSIS — Z22.322 MRSA (METHICILLIN RESISTANT STAPHYLOCOCCUS AUREUS) CARRIER: ICD-10-CM

## 2019-01-03 DIAGNOSIS — Z76.82 ORGAN TRANSPLANT CANDIDATE: ICD-10-CM

## 2019-01-03 DIAGNOSIS — Z01.818 PRE-OP EXAM: Primary | ICD-10-CM

## 2019-01-03 DIAGNOSIS — Z99.2 ANEMIA IN CHRONIC KIDNEY DISEASE, ON CHRONIC DIALYSIS: ICD-10-CM

## 2019-01-03 DIAGNOSIS — E03.9 HYPOTHYROIDISM, UNSPECIFIED TYPE: ICD-10-CM

## 2019-01-03 DIAGNOSIS — D63.1 ANEMIA IN CHRONIC KIDNEY DISEASE, ON CHRONIC DIALYSIS: ICD-10-CM

## 2019-01-03 DIAGNOSIS — N18.6 ANEMIA IN CHRONIC KIDNEY DISEASE, ON CHRONIC DIALYSIS: ICD-10-CM

## 2019-01-03 DIAGNOSIS — Z99.2 ESRD ON DIALYSIS: ICD-10-CM

## 2019-01-03 DIAGNOSIS — N18.6 ESRD ON DIALYSIS: ICD-10-CM

## 2019-01-03 DIAGNOSIS — Z99.2 ESRD ON DIALYSIS: Primary | ICD-10-CM

## 2019-01-03 DIAGNOSIS — N18.6 ESRD ON DIALYSIS: Primary | ICD-10-CM

## 2019-01-03 PROCEDURE — 99999 PR PBB SHADOW E&M-EST. PATIENT-LVL III: CPT | Mod: PBBFAC,TXP,,

## 2019-01-03 PROCEDURE — 99213 OFFICE O/P EST LOW 20 MIN: CPT | Mod: PBBFAC,25,27,TXP

## 2019-01-03 PROCEDURE — 71046 X-RAY EXAM CHEST 2 VIEWS: CPT | Mod: 26,,, | Performed by: RADIOLOGY

## 2019-01-03 PROCEDURE — 99213 OFFICE O/P EST LOW 20 MIN: CPT | Mod: PBBFAC,25,27,TXP | Performed by: NURSE PRACTITIONER

## 2019-01-03 PROCEDURE — 93005 ELECTROCARDIOGRAM TRACING: CPT | Mod: PBBFAC | Performed by: INTERNAL MEDICINE

## 2019-01-03 PROCEDURE — 99999 PR PBB SHADOW E&M-EST. PATIENT-LVL III: ICD-10-PCS | Mod: PBBFAC,TXP,, | Performed by: NURSE PRACTITIONER

## 2019-01-03 PROCEDURE — 99215 OFFICE O/P EST HI 40 MIN: CPT | Mod: S$PBB,,, | Performed by: NURSE PRACTITIONER

## 2019-01-03 PROCEDURE — 93010 EKG 12-LEAD: ICD-10-PCS | Mod: S$PBB,,, | Performed by: INTERNAL MEDICINE

## 2019-01-03 PROCEDURE — 99999 PR PBB SHADOW E&M-EST. PATIENT-LVL III: ICD-10-PCS | Mod: PBBFAC,TXP,,

## 2019-01-03 PROCEDURE — 99213 OFFICE O/P EST LOW 20 MIN: CPT | Mod: PBBFAC,TXP,25

## 2019-01-03 PROCEDURE — 99499 UNLISTED E&M SERVICE: CPT | Mod: S$PBB,,, | Performed by: SURGERY

## 2019-01-03 PROCEDURE — 99499 UNLISTED E&M SERVICE: CPT | Mod: S$PBB,,, | Performed by: NURSE PRACTITIONER

## 2019-01-03 PROCEDURE — 71046 X-RAY EXAM CHEST 2 VIEWS: CPT | Mod: TC,FY,TXP

## 2019-01-03 PROCEDURE — 99499 RISK ADDL DX/OHS AUDIT: ICD-10-PCS | Mod: S$PBB,,, | Performed by: NURSE PRACTITIONER

## 2019-01-03 PROCEDURE — 71046 XR CHEST PA AND LATERAL: ICD-10-PCS | Mod: 26,,, | Performed by: RADIOLOGY

## 2019-01-03 PROCEDURE — 93010 ELECTROCARDIOGRAM REPORT: CPT | Mod: S$PBB,,, | Performed by: INTERNAL MEDICINE

## 2019-01-03 PROCEDURE — 99499 NO LOS: ICD-10-PCS | Mod: S$PBB,,, | Performed by: SURGERY

## 2019-01-03 PROCEDURE — 99999 PR PBB SHADOW E&M-EST. PATIENT-LVL III: CPT | Mod: PBBFAC,TXP,, | Performed by: NURSE PRACTITIONER

## 2019-01-03 PROCEDURE — 99215 PR OFFICE/OUTPT VISIT, EST, LEVL V, 40-54 MIN: ICD-10-PCS | Mod: S$PBB,,, | Performed by: NURSE PRACTITIONER

## 2019-01-03 RX ORDER — FAMOTIDINE 20 MG/1
20 TABLET, FILM COATED ORAL NIGHTLY
Status: CANCELLED | OUTPATIENT
Start: 2019-01-03

## 2019-01-03 RX ORDER — MYCOPHENOLATE MOFETIL 250 MG/1
500 CAPSULE ORAL 2 TIMES DAILY
Status: CANCELLED | OUTPATIENT
Start: 2019-01-03

## 2019-01-03 RX ORDER — DEXTROSE MONOHYDRATE AND SODIUM CHLORIDE 5; .45 G/100ML; G/100ML
INJECTION, SOLUTION INTRAVENOUS CONTINUOUS
Status: CANCELLED | OUTPATIENT
Start: 2019-01-03 | End: 2019-01-04

## 2019-01-03 RX ORDER — ACETAMINOPHEN 650 MG/20.3ML
650 LIQUID ORAL ONCE
Status: CANCELLED | OUTPATIENT
Start: 2019-01-03 | End: 2019-01-03

## 2019-01-03 RX ORDER — HEPARIN SODIUM 5000 [USP'U]/ML
5000 INJECTION, SOLUTION INTRAVENOUS; SUBCUTANEOUS
Status: CANCELLED | OUTPATIENT
Start: 2019-01-03

## 2019-01-03 RX ORDER — IBUPROFEN 200 MG
24 TABLET ORAL
Status: CANCELLED | OUTPATIENT
Start: 2019-01-03

## 2019-01-03 RX ORDER — HYDROMORPHONE HCL IN 0.9% NACL 6 MG/30 ML
PATIENT CONTROLLED ANALGESIA SYRINGE INTRAVENOUS CONTINUOUS
Status: CANCELLED | OUTPATIENT
Start: 2019-01-03

## 2019-01-03 RX ORDER — BISACODYL 5 MG
10 TABLET, DELAYED RELEASE (ENTERIC COATED) ORAL NIGHTLY
Status: CANCELLED | OUTPATIENT
Start: 2019-01-03

## 2019-01-03 RX ORDER — ONDANSETRON 2 MG/ML
4 INJECTION INTRAMUSCULAR; INTRAVENOUS EVERY 8 HOURS PRN
Status: CANCELLED | OUTPATIENT
Start: 2019-01-03 | End: 2019-01-04

## 2019-01-03 RX ORDER — MUPIROCIN 20 MG/G
1 OINTMENT TOPICAL 2 TIMES DAILY
Status: CANCELLED | OUTPATIENT
Start: 2019-01-03 | End: 2019-01-08

## 2019-01-03 RX ORDER — IBUPROFEN 200 MG
16 TABLET ORAL
Status: CANCELLED | OUTPATIENT
Start: 2019-01-03

## 2019-01-03 RX ORDER — GLUCAGON 1 MG
1 KIT INJECTION CONTINUOUS PRN
Status: CANCELLED | OUTPATIENT
Start: 2019-01-03

## 2019-01-03 RX ORDER — NALOXONE HCL 0.4 MG/ML
0.02 VIAL (ML) INJECTION
Status: CANCELLED | OUTPATIENT
Start: 2019-01-03

## 2019-01-03 RX ORDER — DOCUSATE SODIUM 100 MG/1
100 CAPSULE, LIQUID FILLED ORAL 3 TIMES DAILY
Status: CANCELLED | OUTPATIENT
Start: 2019-01-03

## 2019-01-03 RX ORDER — FUROSEMIDE 20 MG/1
20 TABLET ORAL DAILY
Status: ON HOLD | COMMUNITY
End: 2019-01-17 | Stop reason: HOSPADM

## 2019-01-03 RX ORDER — HEPARIN SODIUM 5000 [USP'U]/ML
5000 INJECTION, SOLUTION INTRAVENOUS; SUBCUTANEOUS EVERY 8 HOURS
Status: CANCELLED | OUTPATIENT
Start: 2019-01-03 | End: 2019-01-04

## 2019-01-03 RX ORDER — SULFAMETHOXAZOLE AND TRIMETHOPRIM 400; 80 MG/1; MG/1
1 TABLET ORAL EVERY MORNING
Status: CANCELLED | OUTPATIENT
Start: 2019-01-03

## 2019-01-03 RX ORDER — DIPHENHYDRAMINE HYDROCHLORIDE 50 MG/ML
50 INJECTION INTRAMUSCULAR; INTRAVENOUS ONCE
Status: CANCELLED | OUTPATIENT
Start: 2019-01-03 | End: 2019-01-03

## 2019-01-03 RX ORDER — NYSTATIN 100000 [USP'U]/ML
500000 SUSPENSION ORAL
Status: CANCELLED | OUTPATIENT
Start: 2019-01-03

## 2019-01-03 RX ORDER — TACROLIMUS 0.5 MG/1
2 CAPSULE ORAL 2 TIMES DAILY
Status: CANCELLED | OUTPATIENT
Start: 2019-01-03

## 2019-01-03 RX ORDER — SODIUM CHLORIDE 9 MG/ML
INJECTION, SOLUTION INTRAVENOUS CONTINUOUS
Status: CANCELLED | OUTPATIENT
Start: 2019-01-03 | End: 2019-01-05

## 2019-01-03 NOTE — PROGRESS NOTES
Met with Andra Newell in the clinic as part of her pre-transplant clearance appointment.    1) Performed a complete medication reconciliation.    2) Obtained copies of insurance cards, patient understood that the Pharm.D. will order medications, and that medications must be available prior to discharge   3) Discussed medication education that will occur post-transplant     Patient verbalized understanding and had the opportunity to ask questions

## 2019-01-03 NOTE — LETTER
January 4, 2019        Dalton Heaton  [address] [facility] [address] [facility] [place]  [place] [address]  Phone: [phone]  Fax: [phone]             [facility]  [address]  [facility] [address]  Phone: [phone]   Patient: Andra Newell   MR Number: [id number]   YOB: 1960   Date of Visit: 1/3/2019       Dear Dr. Dalton Heaton    Thank you for referring Andra Newell to me for evaluation. Attached you will find relevant portions of my assessment and plan of care.    If you have questions, please do not hesitate to call me. I look forward to following Andra Newell along with you.    Sincerely,    Melissa Cramer, NP    Enclosure    If you would like to receive this communication electronically, please contact [email or web address] or [phone] to request [email or web address] Link access.    [email or web address] Link is a tool which provides read-only access to select patient information with whom you have a relationship. Its easy to use and provides real time access to review your patients record including encounter summaries, notes, results, and demographic information.    If you feel you have received this communication in error or would no longer like to receive these types of communications, please e-mail [email or web address]

## 2019-01-03 NOTE — H&P
Transplant Surgery  Kidney Transplant Recipient Evaluation    Referring Physician: Dalton Heaton  Current Nephrologist: Dalton Heaton    Subjective:     Reason for Visit: evaluate transplant candidacy    History of Present Illness: Andra Newell is a 58 y.o. year old female undergoing transplant evaluation.    Dialysis History: Andra is pre-dialysis.      Transplant History: N/A    Etiology of Renal Disease: Diabetes Mellitus - Type II (based on medical records from referral).    Review of Systems   Constitutional: Negative.  Negative for activity change, appetite change, chills, diaphoresis, fatigue, fever and unexpected weight change.   Respiratory: Negative for cough, choking, chest tightness and shortness of breath.    Cardiovascular: Negative for chest pain.   Gastrointestinal: Negative for abdominal distention, constipation, diarrhea, nausea and vomiting.   Musculoskeletal: Negative for arthralgias.   Skin: Negative for color change and rash.   Neurological: Negative for dizziness and headaches.   Psychiatric/Behavioral: Negative for confusion.   All other systems reviewed and are negative.      Objective:     Physical Exam:  Constitutional:   Vitals reviewed: yes   Well-nourished and well-groomed: yes  Eyes:   Sclerae icteric: no   Extraocular movements intact: yes  GI:    Bowel sounds normal: yes   Tenderness: no    If yes, quadrant/location: not applicable   Palpable masses: no    If yes, quadrant/location: not applicable   Hepatosplenomegaly: no   Ascites: no   Hernia: no    If yes, type/location: not applicable   Surgical scars: no    If yes, type/location: not applicable  Resp:   Effort normal: yes   Breath sounds normal: yes    CV:   Regular rate and rhythm: yes   Heart sounds normal: yes   Femoral pulses normal: yes   Extremities edematous: no  Skin:   Rashes or lesions present: no    If yes, describe:not applicable   Jaundice:: no    Musculoskeletal:   Gait normal: yes   Strength normal:  yes  Psych:   Oriented to person, place, and time: yes   Affect and mood normal: yes    Additional comments: not applicable    Counseling: We provided Andra Newell with a group education session today.  We discussed kidney transplantation at length with her, including risks, potential complications, and alternatives in the management of her renal failure.  The discussion included complications related to anesthesia, bleeding, infection, primary nonfunction, and ATN.  I discussed the typical postoperative course, length of hospitalization, the need for long-term immunosuppression, and the need for long-term routine follow-up.  I discussed living-donor and -donor transplantation and the relative advantages and disadvantages of each.  I also discussed average waiting times for both living donation and  donation.  I discussed national and center-specific survival rates.  I also mentioned the potential benefit of multicenter listing to candidates listed with centers within more than one organ procurement organization.  All questions were answered.    Final determination of transplant candidacy will be made once evaluation is complete and reviewed by the Kidney & Kidney/Pancreas Selection Committee.         Transplant Surgery - Candidacy   Assessment/Plan:   Andra Newell is pre-dialysis with CKD stage 4 (GFR 15-29 mL/min). I see no surgical contraindication to placing a kidney transplant. Based on available information, Andra Newell is a suitable kidney transplant candidate.     Roland Salazar MD

## 2019-01-03 NOTE — PROGRESS NOTES
PRE-OP TEACHING NOTE    Andra Newell is here today for pre-op appointments.  Pre-op instructions reviewed and written information was provided.  All questions were answered.  Discussed possibility that surgery may be rescheduled if the program is busy with  donor transplants.  Pt was scratched by dog on yesterday.  Appears to be healing.  Instructed patient to contact team if it worsens or any signs and symptoms of infection.  Patient agreed and verbalized understanding of all instructions.

## 2019-01-03 NOTE — PROGRESS NOTES
Kidney Transplant Recipient Preoperative Evaluation    Subjective:     CC:  Preoperative evaluation of kidney transplant candidacy.    HPI:  Ms. Newell is a 58 y.o. year old White female who has been approved to receive a living unrelated kidney transplant.  She has ESRD secondary to diabetic nephropathy and HTN +/- antibiotic induced nephrotoxicity who was on HD from 4/13/18 until beginning of Sept 2018. Patient is predialysis.  She has presented for her final preoperative medical evaluation.  She denies any recent hospitalizations or ED visits.      Denies any new infections, diarrhea, UTis or LUTs. No new meds, denies use of ASA or hormones.   Surgery scheduled for 1/14/2019  Work up, diagnostic tests and labs reviewed.     She was seen by GI on 10/16/18 and diarrhea felt to be secondary to torsemide.  Stool cultures and C diff (-)  Diarrhea has resolve     seeing Dr. Sheth ([phone]) for the last 2-3 years and she has been adherent with her medications and will continue to f/u post txp. Takes Viibryd for depression     HX MRSA carrier     HX hypothyroid, takes levothyroxine  Lab Results   Component Value Date    TSH 0.504 09/10/2018       Past Medical History:   Diagnosis Date    Anemia     Anemia in chronic kidney disease, on chronic dialysis 7/12/2017    Arthritis     Asthma     allergic airway    Colon polyp     Depression     Diabetes mellitus     Diverticulosis     Eosinophilia     ESRD (end stage renal disease)     stage V, due for living donor 9/2018    ESRD on dialysis     GERD (gastroesophageal reflux disease)     Gout     Gout     Hyperlipidemia     Hypertension     Low back pain     MRSA carrier     Obesity     Renal disorder     Rotator cuff syndrome--left     Thyroid disease     Ulnar neuropathy of right upper extremity     Uncontrolled type 2 diabetes mellitus with hyperglycemia        Review of Systems   Constitutional: Negative for activity change, appetite  "change, chills, fatigue, fever and unexpected weight change.   HENT: Negative for congestion, facial swelling, postnasal drip, rhinorrhea, sinus pressure, sore throat and trouble swallowing.    Eyes: Positive for visual disturbance. Negative for pain and redness.        Wears glasses   Respiratory: Negative for cough, chest tightness, shortness of breath and wheezing.    Cardiovascular: Negative.  Negative for chest pain, palpitations and leg swelling.   Gastrointestinal: Negative for abdominal pain, diarrhea, nausea and vomiting.        Takes Pepcid daily for indigestion    Genitourinary: Negative for dysuria, flank pain and urgency.        Still makes urine ~ 4x/day   Musculoskeletal: Negative for gait problem, neck pain and neck stiffness.   Skin: Negative for rash.   Allergic/Immunologic: Negative for environmental allergies, food allergies and immunocompromised state.   Neurological: Negative for dizziness, weakness, light-headedness and headaches.   Psychiatric/Behavioral: Negative for agitation and confusion. The patient is not nervous/anxious.        Objective:    Body mass index is 36.95 kg/m².    Blood pressure (!) 179/85, pulse 80, temperature 98 °F (36.7 °C), temperature source Oral, resp. rate 18, height 5' 5.67" (1.668 m), weight 102.8 kg (226 lb 10.1 oz), last menstrual period 02/28/2012, SpO2 98 %.       Physical Exam   Constitutional: She is oriented to person, place, and time. She appears well-developed and well-nourished.   HENT:   Head: Normocephalic.   Mouth/Throat: Oropharynx is clear and moist. No oropharyngeal exudate.   Eyes: Conjunctivae and EOM are normal. Pupils are equal, round, and reactive to light. No scleral icterus.   Neck: Normal range of motion. Neck supple.   Cardiovascular: Normal rate, regular rhythm and normal heart sounds.   Pulmonary/Chest: Effort normal and breath sounds normal.   Abdominal: Soft. Normal appearance and bowel sounds are normal. She exhibits no distension and " no mass. There is no splenomegaly or hepatomegaly. There is no tenderness. There is no rebound, no guarding, no CVA tenderness, no tenderness at McBurney's point and negative Chase's sign.   Musculoskeletal: Normal range of motion. She exhibits no edema.        Arms:  Lymphadenopathy:     She has no cervical adenopathy.   Neurological: She is alert and oriented to person, place, and time. She exhibits normal muscle tone. Coordination normal.   Skin: Skin is warm and dry.   Psychiatric: She has a normal mood and affect. Her behavior is normal.   Vitals reviewed.      Labs:  1/2/2019: PTH, Intact 145.0 pg/mL* (Ref range: 9.0 - 77.0 pg/mL)  Labs were reviewed with the patient.  Lab Results   Component Value Date    HGBA1C 7.8 (H) 09/10/2018     ABO type: B POS  Lab Results   Component Value Date    WBC 10.05 01/02/2019    HGB 11.3 (L) 01/02/2019    HCT 36.6 (L) 01/02/2019    MCV 99 (H) 01/02/2019     01/02/2019     CMP  Sodium   Date Value Ref Range Status   01/02/2019 144 136 - 145 mmol/L Final     Potassium   Date Value Ref Range Status   01/02/2019 5.1 3.5 - 5.1 mmol/L Final     Chloride   Date Value Ref Range Status   01/02/2019 119 (H) 95 - 110 mmol/L Final     CO2   Date Value Ref Range Status   01/02/2019 18 (L) 23 - 29 mmol/L Final     Glucose   Date Value Ref Range Status   01/02/2019 90 70 - 110 mg/dL Final     BUN, Bld   Date Value Ref Range Status   01/02/2019 80 (H) 6 - 20 mg/dL Final     Creatinine   Date Value Ref Range Status   01/02/2019 3.6 (H) 0.5 - 1.4 mg/dL Final     Calcium   Date Value Ref Range Status   01/02/2019 9.2 8.7 - 10.5 mg/dL Final     Total Protein   Date Value Ref Range Status   10/09/2018 7.6 6.0 - 8.4 g/dL Final     Albumin   Date Value Ref Range Status   01/02/2019 3.7 3.5 - 5.2 g/dL Final     Total Bilirubin   Date Value Ref Range Status   10/09/2018 0.5 0.1 - 1.0 mg/dL Final     Comment:     For infants and newborns, interpretation of results should be based  on  gestational age, weight and in agreement with clinical  observations.  Premature Infant recommended reference ranges:  Up to 24 hours.............<8.0 mg/dL  Up to 48 hours............<12.0 mg/dL  3-5 days..................<15.0 mg/dL  6-29 days.................<15.0 mg/dL       Alkaline Phosphatase   Date Value Ref Range Status   10/09/2018 228 (H) 55 - 135 U/L Final     AST   Date Value Ref Range Status   10/09/2018 15 10 - 40 U/L Final     ALT   Date Value Ref Range Status   10/09/2018 21 10 - 44 U/L Final     Anion Gap   Date Value Ref Range Status   01/02/2019 7 (L) 8 - 16 mmol/L Final     eGFR if    Date Value Ref Range Status   01/02/2019 15.0 (A) >60 mL/min/1.73 m^2 Final     eGFR if non    Date Value Ref Range Status   01/02/2019 13.0 (A) >60 mL/min/1.73 m^2 Final     Comment:     Calculation used to obtain the estimated glomerular filtration  rate (eGFR) is the CKD-EPI equation.          Assessment:     1. Pre-op exam    2. Organ transplant candidate    3. ESRD on dialysis    4. Anemia in chronic kidney disease, on chronic dialysis    5. Uncontrolled type 2 diabetes mellitus with stage 5 chronic kidney disease not on chronic dialysis, with long-term current use of insulin    6. MRSA (methicillin resistant Staphylococcus aureus) carrier    7. Hypothyroidism, unspecified type        Plan:        Transplant Candidacy:   Based on available information, Ms. Newell remains a suitable kidney transplant candidate.  She may proceed with transplant surgery as planned.    Melissa Cramer NP       Follow-up:  After the transplant, the patient will follow-up with the kidney transplant team and get blood work per standard protocol as described below.    Clinic: return to transplant clinic weekly for the first month after transplant; every 2 weeks during months 2-3; then at 6-, 9-, 12-, 18-, 24-, and 36- months post-transplant to reassess for complications from immunosuppression and  monitor for rejection.  Annually thereafter.    Labs: continue twice weekly CBC, renal panel, and drug level for first month; then same labs once weekly through 3rd month post-transplant.  Urine for UA, protein/creatinine ratio monthly.  Add urine BK - PCR at 1-, 3-, 6-, 9-, 12-, 18-, 24-, and 36- months post-transplant.  Hepatic panel at 1-, 2-, 3-, 6-, 9-, 12-, 18-, 24-, and 36- months post-transplant.    Counseling:

## 2019-01-03 NOTE — PROGRESS NOTES
RECIPIENT PRE-OP NOTE-UPDATE     Potential Recipient Name: Andra Newell, [id number]  Encounter Date: 1/3/2019    Sex: female  YOB: 1960  Age: 58 y.o.    Housing/Contact Info:  [address] [address] [facility] [address]  Telephone Information:   Mobile [phone]    Home: [phone] (home)  Work: There is no work phone number on file.  E-mail: [email or web address]    Potential Surgery Date: 1-14-18  Potential Donor Name: Gatito Rodriguez, Clinic Number: [id number]  Potential Donor Relationship: friend    Patient presents as alert and oriented x 4, pleasant, good eye contact, well groomed, recall good, concentration/judgement good, average intelligence, calm, communicative, cooperative and asking and answering questions appropriately. Patient presents as a 58 y.o. year old female to recipient pre-op appointment for scheduled kidney living donor surgery. Patient's back up caregiver  accompanies patient.  Patient states that she is independent with ADLs at this time.  Patient states motivation to pursue organ transplant at this time.    Does patient drive?  Patient is aware will not be able to drive until medically cleared by the transplant team. Patient verbalizes understanding that patient will need assistance for all transportation needs until medically cleared to drive.    Caregivers/Transportation:  Any Marierandy, 61 yo partner/significant other, Wayne Chin, does drive/own car, employed full time at [address]. [phone]  Mariel Wolff, 61 yo friend, Reji AC, does drive/own car, retired. [phone]  Silvia HopkinsAny's sister, 64 yo, Kwaku CHIN, does drive/own car, retired. [phone]    Dependents/Others who rely on Patient/Caregiver for care: pt denies    Cognitive:  Education: high school  Reading Level: 12th grade  Reports difficulty with: seeing wears glasses; denies any problems learning new information  Denies difficulty with: reading, writing, hearing,  comprehension, learning and memory    Infusion Service: patient utilizing? no  Home Health: patient utilizing? no  DME:  patient utilizing? yes scooter for long distances. SW strongly encouraged pt to bring scooter to the hospital in case she needs it for recovery     Living Will: yes  Healthcare Power of : yes  Written LW/HCPA and verbal information presented to patient today.    Insurance: Payor: MEDICARE / Plan: MEDICARE PART A & B / Product Type: Government /  + [place] Medicaid  Possible concerns regarding insurance post-donation reviewed. Patient verbalizes clear understanding.    Financial:  Employment: Patient is currently disabled. Patient does not plan to return to work once medically cleared. Patient referred to vocational rehabilitation. Pt reports receives disability $500 per month. Pt's significant other reports does assist regularly with patient's expenses and reports plan to assist with any financial needs for transplant.  Spouse/Significant Other Employment: significant other employed full time at [address].  Patient states does not expect to have any financial problems following transplant surgery.  Patient states has conducted fundraising to assist with post-transplant costs: fundraised $600.    Tobacco/Alcohol/Illicit Drug Abuse: Patient reports does not use tobacco products, alcohol, illicit drugs and non-prescription medications and that patient does plan to remain abstinent.     Psychiatric History: Mental Health: As per initial 7-12-17 psychosocial: Pt reports history of suicidal ideation in 2015 after a significant life event of injury at work and impact to her ADL independence. Pt reports was in mental health hospital [facility] for stabilization and was referred to out pt therapy. Pt reports currently in weekly psychotherapy with Mague Muniz LCSW in [facility]. Pt reports psychiatrist Dr. Sheth in [place] 1 x month. Pt reports plan to have psychiatry clearance  letter from Dr. Sheth provided to transplant team.  Psychiatrist/Counselor: Dr. Sheth ([place]) and Mague Muniz ([place])  Medications:  Lorazepam 1 mg daily and Vilbryd 40 mg daily.   Suicide/Homicide Issues: 2015 [place] hospitalization for si  Safety at home: Pt reports no problems with safety in the home.    Pt's psychiatrist:  Antonio Sheth MD  [facility]  [address], [place] [address]   [phone]     Pt provided 7-27-17 psychiatry clearance letter from Dr. Antonio Sheth reporting patient is suitable candidate for organ transplant. Letter provided to pre transplant nurse coordinator to place in patient's medical chart.    Coping: Patient states that she is coping well with having kidney living donor surgery. Patient states will call as needed and does understand how to access resources including the  as needed, both inpatient and outpatient.    Resources, information and support provided. Psychosocial aspects regarding organ donation and transplantation were discussed. Patient reports having a clear understanding of resources, information, support and psychosocial aspects.    Discharge Plan: Patient to discharge to own home or [place] apartments (as medically needed) under the care of patient's significant other post-organ transplant. Patient states that patient's significant other will be present as caregiver in the hospital. Patient's significant other will transport patient home. Patient states agreement with not driving and not returning to work until medically cleared to do so.    Patient states having clear understanding and realistic expectations regarding the potential risks and potential benefits of organ transplantation and organ donation. Patient agrees to further discuss with health care team members and support system members, as well as to utilize available resources and express questions and/or concerns. Resources  and information provided and reviewed.    Patient reports motivation to proceed with living organ donor transplantation as scheduled.     Transplant Pharmacy Name: [facility]  Pharmacy Contact Information: Inpt [id number]                                                         Outpt: [id number]       Suitability for Transplant: Patient presents as a suitable candidate for organ transplant at this time.  Patient states does have a caregiver plan, transportation plan, and lodging plan in place. Patient states that patient does have medical and prescription medicine insurance in place and does have a plan in place to afford post-transplant costs.    Patient provided verbal permission to release any necessary information to outside resources for patient care and discharge planning.  Resources and information provided and reviewed.  Patient is choosing has no specific agency preferences.    Pt reports does use [facility] for out pt medicines however is in agreement to using [facility] for transplant medicines if insurance allows    Patient states that she is aware of what patient's normal copays and deductibles are for prescription medicines.       provided psychosocial support, counseling, resources, education, assistance, and discharge planning.  remains available.    Recommendations/Additional Comments: Pt reports lives in [facility] and reports is in agreement to staying nearby in [facility] apartments if medically needed. Pt's significant other/caregiver works and may need employer paperwork completed for any time missed due to transplant.    Patient states is aware of [facility]'s affiliation and/or partnership with agencies in home health care, LTAC, SNF, [facility] and other hospitals and clinics.    Yanira Valle MSW LCSW

## 2019-01-03 NOTE — PROGRESS NOTES
Bicarb low --> please send these labs to her general nephrologist to optimize acidosis control. Also potassium 5.1 -->  her on low potassium diet.

## 2019-01-04 ENCOUNTER — TELEPHONE (OUTPATIENT)
Dept: TRANSPLANT | Facility: CLINIC | Age: 59
End: 2019-01-04

## 2019-01-04 DIAGNOSIS — K21.9 GASTROESOPHAGEAL REFLUX DISEASE WITHOUT ESOPHAGITIS: ICD-10-CM

## 2019-01-04 NOTE — TELEPHONE ENCOUNTER
Labs routed to DR Heaton. Left msg with patient regarding low K diet.       ----- Message from Ariel Martin RN sent at 1/4/2019  3:30 PM CST -----      ----- Message -----  From: Anahy Weiss MD  Sent: 1/3/2019   1:37 PM  To: Kidney Waitlisted Coordinator    Bicarb low --> please send these labs to her general nephrologist to optimize acidosis control. Also potassium 5.1 -->  her on low potassium diet.

## 2019-01-06 RX ORDER — FAMOTIDINE 20 MG/1
20 TABLET, FILM COATED ORAL NIGHTLY PRN
Qty: 30 TABLET | Refills: 4 | Status: ON HOLD | OUTPATIENT
Start: 2019-01-06 | End: 2019-01-15 | Stop reason: HOSPADM

## 2019-01-07 ENCOUNTER — HOSPITAL ENCOUNTER (OUTPATIENT)
Dept: PREADMISSION TESTING | Facility: HOSPITAL | Age: 59
Discharge: HOME | End: 2019-01-07
Attending: ANESTHESIOLOGY
Payer: MEDICARE

## 2019-01-07 VITALS
BODY MASS INDEX: 36.52 KG/M2 | HEIGHT: 66 IN | SYSTOLIC BLOOD PRESSURE: 139 MMHG | OXYGEN SATURATION: 98 % | TEMPERATURE: 99 F | WEIGHT: 227.25 LBS | HEART RATE: 78 BPM | RESPIRATION RATE: 18 BRPM | DIASTOLIC BLOOD PRESSURE: 70 MMHG

## 2019-01-07 DIAGNOSIS — Z76.82 ORGAN TRANSPLANT CANDIDATE: ICD-10-CM

## 2019-01-07 NOTE — DISCHARGE INSTRUCTIONS
Your surgery has been scheduled for:__________________________________________    You should report to:  ____[facility], located on the [place] side of the first floor of the           [facility] ([phone])  ____[facility], located on the [facility] side of the            Second floor of the [facility] ([phone])  ____3rd Floor [facility] located on the [facility] side of the [facility] [phone]  Please Note   - Tell your doctor if you take Aspirin, products containing Aspirin, herbal medications  or blood thinners, such as Coumadin, Ticlid, or Plavix.  (Consult your provider regarding holding or stopping before surgery).  - Arrange for someone to drive you home following surgery.  You will not be allowed to leave the surgical facility alone or drive yourself home following sedation and anesthesia.  Before Surgery  - Stop taking all herbal medications 14days prior to surgery  - No Motrin/Advil (Ibuprofen) 7 days before surgery  - No Aleve (Naproxen) 7 days before surgery  - Stop Taking Asprin, products containing Asprin _____days before surgery  - Stop taking blood thinners_______days before surgery  - No Goody's/BC  Powder 7 days before surgery  - Refrain from drinking alcoholic beverages for 24hours before and after surgery  - Stop or limit smoking _________days before surgery  - You may take Tylenol for pain  Night before Surgery  Stop ALL solid food, gum, candy (including vitamins) 8 hours before arrival time.  (Please note: If your surgeon gives you different eating and drinking instructions, please follow surgeon's directions.)  Stop all CLOUDY liquids: coffee with creamer, formula, tube feeds, cloudy juices, non-human milk and breast milk with additives, 6 hours prior to arrival time.  Stop plain breast milk 4 hours prior to arrival time.  The patient should be ENCOURAGED to drink carbohydrate-rich  clear liquids (sports drinks, clear juices) until 2 hours prior to arrival time.  CLEAR liquids include only water, black coffee NO creamer, clear oral rehydration drinks, clear sports drinks or clear fruit juices (no orange juice, no pulpy juices, no apple cider). Advise patients if they can read newsprint through the liquid, it qualifies as clear liquid.   IF IN DOUBT, drink water instead.   - Take a shower or bath (shower is recommended).  Bathe with Hibiclens soap or an antibacterial soap from the neck down.  If not supplied by your surgeon, hibiclens soap will need to be purchased over the counter in pharmacy.  Rinse soap off thoroughly.  - Shampoo your hair with your regular shampoo  The Day of Surgery  · NOTHING TO  DRINK 2 hours before arrival time. If you are told to take medication on the morning of surgery, it may be taken with a sip of water.   - Take another bath or shower with hibiclens or any antibacterial soap, to reduce the chance of infection.  - Take heart and blood pressure medications with a small sip of water, as advised by the perioperative team.  - Do not take fluid pills  - You may brush your teeth and rinse your mouth, but do not swall any additional water.   - Do not apply perfumes, powder, body lotions or deodorant on the day of surgery.  - Nail polish should be removed.  - Do not wear makeup or moisturizer  - Wear comfortable clothes, such as a button front shirt and loose fitting pants.  - Leave all jewelry, including body piercings, and valuables at home.    - Bring any devices you will neeed after surgery such as crutches or canes.  - If you have sleep apnea, please bring your CPAP machine  In the event that your physical condition changes including the onset of a cold or respiratory illness, or if you have to delay or cancel your surgery, please notify your surgeon.  Anesthesia: General Anesthesia     You are watched continuously during your procedure by your anesthesia provider.      Youre due to have surgery. During surgery, youll be given medicine called anesthesia or anesthetic. This will keep you comfortable and pain-free. Your anesthesia provider will use general anesthesia.  What is general anesthesia?  General anesthesia puts you into a state like deep sleep. It goes into the bloodstream (IV anesthetics), into the lungs (gas anesthetics), or both. You feel nothing during the procedure. You will not remember it. During the procedure, the anesthesia provider monitors you continuously. He or she checks your heart rate and rhythm, blood pressure, breathing, and blood oxygen.  · IV anesthetics. IV anesthetics are given through an IV line in your arm. Theyre often given first. This is so you are asleep before a gas anesthetic is started. Some kinds of IV anesthetics relieve pain. Others relax you. Your doctor will decide which kind is best in your case.  · Gas anesthetics. Gas anesthetics are breathed into the lungs. They are often used to keep you asleep. They can be given through a facemask or a tube placed in your larynx or trachea (breathing tube).  ? If you have a facemask, your anesthesia provider will most likely place it over your nose and mouth while youre still awake. Youll breathe oxygen through the mask as your IV anesthetic is started. Gas anesthetic may be added through the mask.  ? If you have a tube in the larynx or trachea, it will be inserted into your throat after youre asleep.  Anesthesia tools and medicines  You will likely have:  · IV anesthetics. These are put into an IV line into your bloodstream.  · Gas anesthetics. You breathe these anesthetics into your lungs, where they pass into your bloodstream.  · Pulse oximeter. This is a small clip that is attached to the end of your finger. This measures your blood oxygen level.  · Electrocardiography leads (electrodes). These are small sticky pads that are placed on your chest. They record your heart rate and  rhythm.  · Blood pressure cuff. This reads your blood pressure.  Risks and possible complications  General anesthesia has some risks. These include:  · Breathing problems  · Nausea and vomiting  · Sore throat or hoarseness (usually temporary)  · Allergic reaction to the anesthetic  · Irregular heartbeat (rare)  · Cardiac arrest (rare)   Anesthesia safety  · Follow all instructions you are given for how long not to eat or drink before your procedure.  · Be sure your doctor knows what medicines and drugs you take. This includes over-the-counter medicines, herbs, supplements, alcohol or other drugs. You will be asked when those were last taken.  · Have an adult family member or friend drive you home after the procedure.  · For the first 24 hours after your surgery:  ? Do not drive or use heavy equipment.  ? Do not make important decisions or sign legal documents. If important decisions or signing legal documents is necessary during the first 24 hours after surgery, have a trusted family member or spouse act on your behalf.  ? Avoid alcohol.  ? Have a responsible adult stay with you. He or she can watch for problems and help keep you safe.  Date Last Reviewed: 12/1/2016  © 1217-0899 [employer]. [address] [place] [address], [place] [address]. All rights reserved. This information is not intended as a substitute for professional medical care. Always follow your healthcare professional's instructions

## 2019-01-09 ENCOUNTER — LAB VISIT (OUTPATIENT)
Dept: LAB | Facility: HOSPITAL | Age: 59
End: 2019-01-09
Attending: NURSE PRACTITIONER
Payer: MEDICARE

## 2019-01-09 DIAGNOSIS — Z76.82 ORGAN TRANSPLANT CANDIDATE: ICD-10-CM

## 2019-01-09 LAB
ESTIMATED AVG GLUCOSE: 143 MG/DL
HBA1C MFR BLD HPLC: 6.6 %

## 2019-01-09 PROCEDURE — 86826 HLA X-MATCH NONCYTOTOXC ADDL: CPT | Mod: 91,PO,TXP

## 2019-01-09 PROCEDURE — 86825 HLA X-MATH NON-CYTOTOXIC: CPT | Mod: 91,PO,TXP

## 2019-01-09 PROCEDURE — 36415 COLL VENOUS BLD VENIPUNCTURE: CPT | Mod: PO,TXP

## 2019-01-09 PROCEDURE — 86825 HLA X-MATH NON-CYTOTOXIC: CPT | Mod: PO,TXP

## 2019-01-09 PROCEDURE — 86826 HLA X-MATCH NONCYTOTOXC ADDL: CPT | Mod: PO,TXP

## 2019-01-09 PROCEDURE — 83036 HEMOGLOBIN GLYCOSYLATED A1C: CPT | Mod: TXP

## 2019-01-10 ENCOUNTER — LAB VISIT (OUTPATIENT)
Dept: LAB | Facility: HOSPITAL | Age: 59
End: 2019-01-10
Attending: NURSE PRACTITIONER
Payer: MEDICARE

## 2019-01-10 DIAGNOSIS — Z76.82 ORGAN TRANSPLANT CANDIDATE: ICD-10-CM

## 2019-01-10 DIAGNOSIS — Z76.82 ORGAN TRANSPLANT CANDIDATE: Primary | ICD-10-CM

## 2019-01-10 PROCEDURE — 86832 HLA CLASS I HIGH DEFIN QUAL: CPT | Mod: PO

## 2019-01-10 PROCEDURE — 86977 RBC SERUM PRETX INCUBJ/INHIB: CPT | Mod: 91,PO

## 2019-01-10 PROCEDURE — 86833 HLA CLASS II HIGH DEFIN QUAL: CPT | Mod: PO

## 2019-01-11 ENCOUNTER — TELEPHONE (OUTPATIENT)
Dept: TRANSPLANT | Facility: CLINIC | Age: 59
End: 2019-01-11

## 2019-01-11 LAB
B CELL RESULTS - XM ALLO: NEGATIVE
B CELL RESULTS - XM ALLO: NEGATIVE
B CELL RESULTS - XM AUTO: NEGATIVE
B MCS AVERAGE - XM ALLO: 49
B MCS AVERAGE - XM ALLO: 53.5
B MCS AVERAGE - XM AUTO: 28
DONOR MRN: NORMAL
DONOR MRN: NORMAL
FXMAL TESTING DATE: NORMAL
FXMAL TESTING DATE: NORMAL
FXMAU TESTING DATE: NORMAL
HLA FLOW CROSSMATCH (ALLO) INTERPRETATION: NORMAL
HLA FLOW CROSSMATCH (AUTO) INTERPRETATION: NORMAL
SERUM COLLECTION DT - XM ALLO: NORMAL
SERUM COLLECTION DT - XM ALLO: NORMAL
SERUM COLLECTION DT - XM AUTO: NORMAL
T CELL RESULTS - XM ALLO: NEGATIVE
T CELL RESULTS - XM ALLO: NEGATIVE
T CELL RESULTS - XM AUTO: NEGATIVE
T MCS AVERAGE - XM ALLO: 12
T MCS AVERAGE - XM ALLO: 27.5
T MCS AVERAGE - XM AUTO: 32.5

## 2019-01-11 NOTE — TELEPHONE ENCOUNTER
Donor/Recipient Compatibility    Test/Item Donor Recipient Acceptable/Not Acceptable   ABO O pos B POS Acceptable   HBsAb (Hepatitis B Surface Antibody) Negative Hep. B Surf Ab, Qual (no units)   Date Value   07/19/2018 POSITIVE     Hep. B Surf Ab, Quant. (mIU/mL)   Date Value   07/19/2018 114    Acceptable   HBsAg (Hepatitis B Surface Antigen) Negative Hepatitis B Surface Ag (no units)   Date Value   01/03/2019 Negative    Acceptable   HBcAb (Hepatitis Core Antibody) Negative Hep B Core Total Ab (no units)   Date Value   01/03/2019 Negative    Acceptable   HCVab (Hepatitis C antibody) Negative Hepatitis C Ab (no units)   Date Value   07/12/2017 Negative    Acceptable   HIV  Negative HIV 1/2 Ag/Ab (no units)   Date Value   01/03/2019 Negative    Acceptable   CMV Reactive  Cytomegalovirus IgM Ab (U/mL)   Date Value   01/03/2019 <8.0     CMV IgG Interpretation (no units)   Date Value   01/03/2019 Non-Reactive     If no results found, check Results Review for Sendouts Acceptable   RPR Negative RPR (no units)   Date Value   07/12/2017 Non-reactive    Acceptable       Recipient Only     X Match reviewed     Donor Only    Imaging reviewed imaging and agree with selection coordinator for left kidney   DERRICK Testing Acceptable  Look in the Labs tab of Chart Review to find the results or Results Review activity.     Kd Perea MD

## 2019-01-11 NOTE — TELEPHONE ENCOUNTER
Donor/Recipient Compatibility    Test/Item Donor Recipient Acceptable/Not Acceptable   ABO No linked donor was found. B POS Acceptable   HBsAb (Hepatitis B Surface Antibody) Organ record is missing. Hep. B Surf Ab, Qual (no units)   Date Value   07/19/2018 POSITIVE     Hep. B Surf Ab, Quant. (mIU/mL)   Date Value   07/19/2018 114    Acceptable   HBsAg (Hepatitis B Surface Antigen) Organ record is missing. Hepatitis B Surface Ag (no units)   Date Value   01/03/2019 Negative    Acceptable   HBcAb (Hepatitis Core Antibody) Organ record is missing. Hep B Core Total Ab (no units)   Date Value   01/03/2019 Negative    Acceptable   HCVab (Hepatitis C antibody) Organ record is missing. Hepatitis C Ab (no units)   Date Value   07/12/2017 Negative    Acceptable   HIV  Organ record is missing. HIV 1/2 Ag/Ab (no units)   Date Value   01/03/2019 Negative    Acceptable   CMV Organ record is missing. Cytomegalovirus IgM Ab (U/mL)   Date Value   01/03/2019 <8.0     CMV IgG Interpretation (no units)   Date Value   01/03/2019 Non-Reactive     If no results found, check Results Review for Sendouts Acceptable   RPR Organ record is missing. RPR (no units)   Date Value   07/12/2017 Non-reactive    Acceptable       Recipient Only    X Match reviewed     Donor Only    Imaging n/a   DERRICK Testing Acceptable  Look in the Labs tab of Chart Review to find the results or Results Review activity.     July Vasquez MD

## 2019-01-11 NOTE — TELEPHONE ENCOUNTER
Recipient Information  Name: Andra Newell  MRN: [id number]  Age: 58 y.o. BMI: 37      Primary Surgeon:Martin   Secondary Surgeon:TAYLOR    Dialysis status: pre-dialysis  GFR:  13.5 mL/min  K+ at pre-op:   Lab Results   Component Value Date    K 4.3 01/03/2019         Plan for K+:    Re-Transplant: No  Currently on Blood thinners? No Reason:     Induction: Campath    Other Considerations:    Donor Information  Name: Gatito Rodriguez    MRN: [id number]  Age: 38     GFR:  113 mL/min  BMI:  32.1     Surgeon: Mariposa  Split Function: Right 47.8%  Left 52.2% UNOS ID: [id number]  Kidney to be donated: the left side          Final CXM Results:  HLA Flow Crossmatch (Allo) Interpretation (no units)   Date Value   01/09/2019     FINAL CROSSMATCH DSA detected: 2/5/2018--DP2(1551) Results reported to Conrad Shea on 1/11/2019 @ 2:15pm. VRB obtained.     T MCS Average - XM Allo (no units)   Date Value   01/09/2019 12.00   01/09/2019 27.50     T Cell Results - XM Allo (no units)   Date Value   01/09/2019 Negative   01/09/2019 Negative     B MCS Average - XM Allo (no units)   Date Value   01/09/2019 53.50   01/09/2019 49.00     B Cell Results - XM Allo (no units)   Date Value   01/09/2019 Negative   01/09/2019 Negative        T MCS Average - XM Auto (no units)   Date Value   01/09/2019 32.50     T Cell Results - XM Auto (no units)   Date Value   01/09/2019 Negative     B MCS Average - XM Auto (no units)   Date Value   01/09/2019 28.00     B Cell Results - XM Auto (no units)   Date Value   01/09/2019 Negative       Comments:

## 2019-01-11 NOTE — TELEPHONE ENCOUNTER
Recipient Information  Name: Andra Newell  MRN: [id number]  Age: 58 y.o. BMI: 37      Primary Surgeon:Martin   Secondary Surgeon:TAYLOR    Dialysis status: pre-dialysis  GFR:  13.5 mL/min  K+ at pre-op:   Lab Results   Component Value Date    K 4.3 01/03/2019         Plan for K+:    Re-Transplant: No  Currently on Blood thinners? No Reason:     Induction: Campath    Other Considerations:    Donor Information  Name: Gatito Rodriguez    MRN: [id number]  Age: 38     GFR:  113 mL/min  BMI:  32.1     Surgeon: Mariposa  Split Function: Right 47.8%  Left 52.2% UNOS ID: [id number]  Kidney to be donated: the left side          Final CXM Results:  HLA Flow Crossmatch (Allo) Interpretation (no units)   Date Value   01/09/2019     FINAL CROSSMATCH DSA detected: 2/5/2018--DP2(1551) Results reported to Conrad Shea on 1/11/2019 @ 2:15pm. VRB obtained.     T MCS Average - XM Allo (no units)   Date Value   01/09/2019 12.00   01/09/2019 27.50     T Cell Results - XM Allo (no units)   Date Value   01/09/2019 Negative   01/09/2019 Negative     B MCS Average - XM Allo (no units)   Date Value   01/09/2019 53.50   01/09/2019 49.00     B Cell Results - XM Allo (no units)   Date Value   01/09/2019 Negative   01/09/2019 Negative        T MCS Average - XM Auto (no units)   Date Value   01/09/2019 32.50     T Cell Results - XM Auto (no units)   Date Value   01/09/2019 Negative     B MCS Average - XM Auto (no units)   Date Value   01/09/2019 28.00     B Cell Results - XM Auto (no units)   Date Value   01/09/2019 Negative       Comments:

## 2019-01-13 ENCOUNTER — NURSE TRIAGE (OUTPATIENT)
Dept: ADMINISTRATIVE | Facility: CLINIC | Age: 59
End: 2019-01-13

## 2019-01-13 NOTE — TELEPHONE ENCOUNTER
Reason for Disposition   Caller has medication question only, adult not sick, and triager answers question    Protocols used: ST MEDICATION QUESTION CALL-A-AH

## 2019-01-14 ENCOUNTER — HOSPITAL ENCOUNTER (INPATIENT)
Facility: HOSPITAL | Age: 59
LOS: 4 days | Discharge: HOME | DRG: 652 | End: 2019-01-18
Attending: SURGERY | Admitting: SURGERY
Payer: MEDICARE

## 2019-01-14 ENCOUNTER — ANESTHESIA (OUTPATIENT)
Dept: SURGERY | Facility: HOSPITAL | Age: 59
DRG: 652 | End: 2019-01-14
Payer: MEDICARE

## 2019-01-14 ENCOUNTER — TELEPHONE (OUTPATIENT)
Dept: TRANSPLANT | Facility: CLINIC | Age: 59
End: 2019-01-14

## 2019-01-14 DIAGNOSIS — Z78.9 PRESENCE OF SURGICAL INCISION: ICD-10-CM

## 2019-01-14 DIAGNOSIS — T38.0X5S ADVERSE EFFECT OF ADRENAL CORTICAL STEROIDS, SEQUELA: ICD-10-CM

## 2019-01-14 DIAGNOSIS — M19.90 ARTHRITIS: Chronic | ICD-10-CM

## 2019-01-14 DIAGNOSIS — E87.20 METABOLIC ACIDOSIS: ICD-10-CM

## 2019-01-14 DIAGNOSIS — Z99.2 ESRD ON DIALYSIS: ICD-10-CM

## 2019-01-14 DIAGNOSIS — E03.9 HYPOTHYROIDISM, UNSPECIFIED TYPE: ICD-10-CM

## 2019-01-14 DIAGNOSIS — R07.9 CHEST PAIN: ICD-10-CM

## 2019-01-14 DIAGNOSIS — D62 ACUTE BLOOD LOSS ANEMIA: ICD-10-CM

## 2019-01-14 DIAGNOSIS — Z94.0 STATUS POST LIVING-DONOR KIDNEY TRANSPLANTATION: Primary | ICD-10-CM

## 2019-01-14 DIAGNOSIS — Z79.60 LONG-TERM USE OF IMMUNOSUPPRESSANT MEDICATION: ICD-10-CM

## 2019-01-14 DIAGNOSIS — F32.9 MAJOR DEPRESSIVE DISORDER, REMISSION STATUS UNSPECIFIED, UNSPECIFIED WHETHER RECURRENT: ICD-10-CM

## 2019-01-14 DIAGNOSIS — Z29.89 PROPHYLACTIC IMMUNOTHERAPY: ICD-10-CM

## 2019-01-14 DIAGNOSIS — E83.39 HYPOPHOSPHATEMIA: ICD-10-CM

## 2019-01-14 DIAGNOSIS — N18.6 ESRD ON DIALYSIS: ICD-10-CM

## 2019-01-14 DIAGNOSIS — D69.6 THROMBOCYTOPENIA, UNSPECIFIED: ICD-10-CM

## 2019-01-14 DIAGNOSIS — Z91.89 AT RISK FOR OPPORTUNISTIC INFECTIONS: ICD-10-CM

## 2019-01-14 DIAGNOSIS — E55.9 VITAMIN D DEFICIENCY: ICD-10-CM

## 2019-01-14 LAB
ALBUMIN SERPL BCP-MCNC: 3.3 G/DL
ANION GAP SERPL CALC-SCNC: 9 MMOL/L
BUN SERPL-MCNC: 73 MG/DL
CALCIUM SERPL-MCNC: 8 MG/DL
CHLORIDE SERPL-SCNC: 120 MMOL/L
CLASS I ANTIBODIES - LUMINEX: NORMAL
CLASS I ANTIBODY COMMENTS - LUMINEX: NORMAL
CLASS II ANTIBODIES - LUMINEX: NORMAL
CLASS II ANTIBODY COMMENTS - LUMINEX: NORMAL
CO2 SERPL-SCNC: 13 MMOL/L
CPRA %: 86
CREAT SERPL-MCNC: 2.9 MG/DL
CREAT UR-MCNC: 85 MG/DL
EST. GFR  (AFRICAN AMERICAN): 19.5 ML/MIN/1.73 M^2
EST. GFR  (NON AFRICAN AMERICAN): 16.9 ML/MIN/1.73 M^2
GLUCOSE SERPL-MCNC: 151 MG/DL
HCT VFR BLD AUTO: 35.4 %
HCT VFR BLD AUTO: 35.4 %
HPRA INTERPRETATION: NORMAL
PHOSPHATE SERPL-MCNC: 5.1 MG/DL
POCT GLUCOSE: 140 MG/DL (ref 70–110)
POCT GLUCOSE: 185 MG/DL (ref 70–110)
POCT GLUCOSE: 74 MG/DL (ref 70–110)
POTASSIUM SERPL-SCNC: 5 MMOL/L
PROT UR-MCNC: 51 MG/DL
PROT/CREAT UR: 0.6 MG/G{CREAT}
SERUM COLLECTION DT - LUMINEX CLASS I: NORMAL
SERUM COLLECTION DT - LUMINEX CLASS II: NORMAL
SODIUM SERPL-SCNC: 142 MMOL/L
SPCL1 TESTING DATE: NORMAL
SPCL2 TESTING DATE: NORMAL
SPLUA TESTING DATE: NORMAL

## 2019-01-14 PROCEDURE — S5010 5% DEXTROSE AND 0.45% SALINE: HCPCS | Performed by: SURGERY

## 2019-01-14 PROCEDURE — 36415 COLL VENOUS BLD VENIPUNCTURE: CPT

## 2019-01-14 PROCEDURE — 20600001 HC STEP DOWN PRIVATE ROOM

## 2019-01-14 PROCEDURE — 50325 PR TRANSPLANT,PREP LIVING  RENAL GRAFT: ICD-10-PCS | Mod: 51,LT,GC, | Performed by: SURGERY

## 2019-01-14 PROCEDURE — 71000039 HC RECOVERY, EACH ADD'L HOUR: Performed by: SURGERY

## 2019-01-14 PROCEDURE — 82962 GLUCOSE BLOOD TEST: CPT | Performed by: SURGERY

## 2019-01-14 PROCEDURE — D9220A PRA ANESTHESIA: ICD-10-PCS | Mod: ANES,,, | Performed by: ANESTHESIOLOGY

## 2019-01-14 PROCEDURE — 63600175 PHARM REV CODE 636 W HCPCS: Performed by: SURGERY

## 2019-01-14 PROCEDURE — 50605 PR URETEROTOMY TO INSERT STENT: ICD-10-PCS | Mod: 51,LT,GC, | Performed by: SURGERY

## 2019-01-14 PROCEDURE — 50360 RNL ALTRNSPLJ W/O RCP NFRCT: CPT | Mod: LT,GC,, | Performed by: SURGERY

## 2019-01-14 PROCEDURE — 85014 HEMATOCRIT: CPT

## 2019-01-14 PROCEDURE — 50605 INSERT URETERAL SUPPORT: CPT | Mod: 51,LT,GC, | Performed by: SURGERY

## 2019-01-14 PROCEDURE — 94660 CPAP INITIATION&MGMT: CPT

## 2019-01-14 PROCEDURE — 27201423 OPTIME MED/SURG SUP & DEVICES STERILE SUPPLY: Performed by: SURGERY

## 2019-01-14 PROCEDURE — 36000930 HC OR TIME LEV VII 1ST 15 MIN: Performed by: SURGERY

## 2019-01-14 PROCEDURE — 94770 HC EXHALED C02 TEST: CPT

## 2019-01-14 PROCEDURE — 71000033 HC RECOVERY, INTIAL HOUR: Performed by: SURGERY

## 2019-01-14 PROCEDURE — D9220A PRA ANESTHESIA: ICD-10-PCS | Mod: CRNA,,, | Performed by: NURSE ANESTHETIST, CERTIFIED REGISTERED

## 2019-01-14 PROCEDURE — 25000003 PHARM REV CODE 250: Performed by: NURSE ANESTHETIST, CERTIFIED REGISTERED

## 2019-01-14 PROCEDURE — 63600175 PHARM REV CODE 636 W HCPCS: Mod: NTX | Performed by: SURGERY

## 2019-01-14 PROCEDURE — 94799 UNLISTED PULMONARY SVC/PX: CPT

## 2019-01-14 PROCEDURE — C2617 STENT, NON-COR, TEM W/O DEL: HCPCS | Performed by: SURGERY

## 2019-01-14 PROCEDURE — 50325 PREP DONOR RENAL GRAFT: CPT | Mod: 51,LT,GC, | Performed by: SURGERY

## 2019-01-14 PROCEDURE — 37000008 HC ANESTHESIA 1ST 15 MINUTES: Performed by: SURGERY

## 2019-01-14 PROCEDURE — 37000009 HC ANESTHESIA EA ADD 15 MINS: Performed by: SURGERY

## 2019-01-14 PROCEDURE — 36620 PR INSERT CATH,ART,PERCUT,SHORTTERM: ICD-10-PCS | Mod: 59,,, | Performed by: ANESTHESIOLOGY

## 2019-01-14 PROCEDURE — 80069 RENAL FUNCTION PANEL: CPT

## 2019-01-14 PROCEDURE — 27000221 HC OXYGEN, UP TO 24 HOURS

## 2019-01-14 PROCEDURE — 25000003 PHARM REV CODE 250: Mod: NTX | Performed by: SURGERY

## 2019-01-14 PROCEDURE — 50360 PR TRANSPLANTATION OF KIDNEY: ICD-10-PCS | Mod: LT,GC,, | Performed by: SURGERY

## 2019-01-14 PROCEDURE — 82570 ASSAY OF URINE CREATININE: CPT

## 2019-01-14 PROCEDURE — 63600175 PHARM REV CODE 636 W HCPCS: Performed by: NURSE ANESTHETIST, CERTIFIED REGISTERED

## 2019-01-14 PROCEDURE — 94761 N-INVAS EAR/PLS OXIMETRY MLT: CPT

## 2019-01-14 PROCEDURE — 27000190 HC CPAP FULL FACE MASK W/VALVE

## 2019-01-14 PROCEDURE — 36620 INSERTION CATHETER ARTERY: CPT | Mod: 59,,, | Performed by: ANESTHESIOLOGY

## 2019-01-14 PROCEDURE — 25000003 PHARM REV CODE 250: Performed by: SURGERY

## 2019-01-14 PROCEDURE — 36000931 HC OR TIME LEV VII EA ADD 15 MIN: Performed by: SURGERY

## 2019-01-14 PROCEDURE — D9220A PRA ANESTHESIA: Mod: CRNA,,, | Performed by: NURSE ANESTHETIST, CERTIFIED REGISTERED

## 2019-01-14 PROCEDURE — 99900035 HC TECH TIME PER 15 MIN (STAT)

## 2019-01-14 PROCEDURE — D9220A PRA ANESTHESIA: Mod: ANES,,, | Performed by: ANESTHESIOLOGY

## 2019-01-14 DEVICE — STENT DOUBLE J 7FRX12CM
Type: IMPLANTABLE DEVICE | Site: URETER | Status: NON-FUNCTIONAL
Removed: 2019-02-18

## 2019-01-14 RX ORDER — IBUPROFEN 200 MG
16 TABLET ORAL
Status: DISCONTINUED | OUTPATIENT
Start: 2019-01-14 | End: 2019-01-18 | Stop reason: HOSPADM

## 2019-01-14 RX ORDER — NYSTATIN 100000 [USP'U]/ML
500000 SUSPENSION ORAL
Status: DISCONTINUED | OUTPATIENT
Start: 2019-01-14 | End: 2019-01-18 | Stop reason: HOSPADM

## 2019-01-14 RX ORDER — DEXTROSE MONOHYDRATE AND SODIUM CHLORIDE 5; .45 G/100ML; G/100ML
INJECTION, SOLUTION INTRAVENOUS CONTINUOUS
Status: DISCONTINUED | OUTPATIENT
Start: 2019-01-14 | End: 2019-01-15

## 2019-01-14 RX ORDER — SODIUM CHLORIDE 9 MG/ML
INJECTION, SOLUTION INTRAVENOUS CONTINUOUS PRN
Status: DISCONTINUED | OUTPATIENT
Start: 2019-01-14 | End: 2019-01-14

## 2019-01-14 RX ORDER — IBUPROFEN 200 MG
24 TABLET ORAL
Status: DISCONTINUED | OUTPATIENT
Start: 2019-01-14 | End: 2019-01-18 | Stop reason: HOSPADM

## 2019-01-14 RX ORDER — METHYLPREDNISOLONE SOD SUCC 125 MG
VIAL (EA) INJECTION
Status: DISCONTINUED | OUTPATIENT
Start: 2019-01-14 | End: 2019-01-14

## 2019-01-14 RX ORDER — CEFAZOLIN SODIUM 1 G/3ML
2 INJECTION, POWDER, FOR SOLUTION INTRAMUSCULAR; INTRAVENOUS
Status: COMPLETED | OUTPATIENT
Start: 2019-01-14 | End: 2019-01-14

## 2019-01-14 RX ORDER — GLYCOPYRROLATE 0.2 MG/ML
INJECTION INTRAMUSCULAR; INTRAVENOUS
Status: DISCONTINUED | OUTPATIENT
Start: 2019-01-14 | End: 2019-01-14

## 2019-01-14 RX ORDER — ACETAMINOPHEN 650 MG/20.3ML
650 LIQUID ORAL ONCE
Status: COMPLETED | OUTPATIENT
Start: 2019-01-14 | End: 2019-01-14

## 2019-01-14 RX ORDER — FUROSEMIDE 10 MG/ML
INJECTION INTRAMUSCULAR; INTRAVENOUS
Status: DISCONTINUED | OUTPATIENT
Start: 2019-01-14 | End: 2019-01-14

## 2019-01-14 RX ORDER — SODIUM CHLORIDE 0.9 % (FLUSH) 0.9 %
3 SYRINGE (ML) INJECTION
Status: DISCONTINUED | OUTPATIENT
Start: 2019-01-14 | End: 2019-01-18 | Stop reason: HOSPADM

## 2019-01-14 RX ORDER — HYDROMORPHONE HYDROCHLORIDE 1 MG/ML
0.2 INJECTION, SOLUTION INTRAMUSCULAR; INTRAVENOUS; SUBCUTANEOUS EVERY 5 MIN PRN
Status: DISCONTINUED | OUTPATIENT
Start: 2019-01-14 | End: 2019-01-14 | Stop reason: HOSPADM

## 2019-01-14 RX ORDER — NALOXONE HCL 0.4 MG/ML
0.02 VIAL (ML) INJECTION
Status: DISCONTINUED | OUTPATIENT
Start: 2019-01-14 | End: 2019-01-18 | Stop reason: HOSPADM

## 2019-01-14 RX ORDER — DOCUSATE SODIUM 100 MG/1
100 CAPSULE, LIQUID FILLED ORAL 3 TIMES DAILY
Status: DISCONTINUED | OUTPATIENT
Start: 2019-01-14 | End: 2019-01-18

## 2019-01-14 RX ORDER — CEFAZOLIN SODIUM 1 G/3ML
2 INJECTION, POWDER, FOR SOLUTION INTRAMUSCULAR; INTRAVENOUS
Status: COMPLETED | OUTPATIENT
Start: 2019-01-14 | End: 2019-01-15

## 2019-01-14 RX ORDER — LIDOCAINE HCL/PF 100 MG/5ML
SYRINGE (ML) INTRAVENOUS
Status: DISCONTINUED | OUTPATIENT
Start: 2019-01-14 | End: 2019-01-14

## 2019-01-14 RX ORDER — MIDAZOLAM HYDROCHLORIDE 1 MG/ML
INJECTION, SOLUTION INTRAMUSCULAR; INTRAVENOUS
Status: DISCONTINUED | OUTPATIENT
Start: 2019-01-14 | End: 2019-01-14

## 2019-01-14 RX ORDER — ONDANSETRON 2 MG/ML
4 INJECTION INTRAMUSCULAR; INTRAVENOUS EVERY 8 HOURS PRN
Status: DISPENSED | OUTPATIENT
Start: 2019-01-14 | End: 2019-01-15

## 2019-01-14 RX ORDER — PROPOFOL 10 MG/ML
VIAL (ML) INTRAVENOUS
Status: DISCONTINUED | OUTPATIENT
Start: 2019-01-14 | End: 2019-01-14

## 2019-01-14 RX ORDER — HEPARIN SODIUM 5000 [USP'U]/ML
5000 INJECTION, SOLUTION INTRAVENOUS; SUBCUTANEOUS EVERY 8 HOURS
Status: DISCONTINUED | OUTPATIENT
Start: 2019-01-14 | End: 2019-01-14 | Stop reason: HOSPADM

## 2019-01-14 RX ORDER — SULFAMETHOXAZOLE AND TRIMETHOPRIM 400; 80 MG/1; MG/1
1 TABLET ORAL EVERY MORNING
Status: DISCONTINUED | OUTPATIENT
Start: 2019-01-15 | End: 2019-01-18 | Stop reason: HOSPADM

## 2019-01-14 RX ORDER — ONDANSETRON 2 MG/ML
INJECTION INTRAMUSCULAR; INTRAVENOUS
Status: DISCONTINUED | OUTPATIENT
Start: 2019-01-14 | End: 2019-01-14

## 2019-01-14 RX ORDER — MANNITOL 250 MG/ML
INJECTION, SOLUTION INTRAVENOUS
Status: DISCONTINUED | OUTPATIENT
Start: 2019-01-14 | End: 2019-01-14

## 2019-01-14 RX ORDER — HEPARIN SODIUM 5000 [USP'U]/ML
5000 INJECTION, SOLUTION INTRAVENOUS; SUBCUTANEOUS EVERY 8 HOURS
Status: DISCONTINUED | OUTPATIENT
Start: 2019-01-15 | End: 2019-01-18 | Stop reason: HOSPADM

## 2019-01-14 RX ORDER — HYDROMORPHONE HCL IN 0.9% NACL 6 MG/30 ML
PATIENT CONTROLLED ANALGESIA SYRINGE INTRAVENOUS CONTINUOUS
Status: DISCONTINUED | OUTPATIENT
Start: 2019-01-14 | End: 2019-01-15

## 2019-01-14 RX ORDER — SODIUM CHLORIDE 9 MG/ML
INJECTION, SOLUTION INTRAVENOUS CONTINUOUS
Status: DISCONTINUED | OUTPATIENT
Start: 2019-01-14 | End: 2019-01-15

## 2019-01-14 RX ORDER — ESMOLOL HYDROCHLORIDE 10 MG/ML
INJECTION INTRAVENOUS
Status: DISCONTINUED | OUTPATIENT
Start: 2019-01-14 | End: 2019-01-14

## 2019-01-14 RX ORDER — METOPROLOL TARTRATE 1 MG/ML
INJECTION, SOLUTION INTRAVENOUS
Status: DISCONTINUED | OUTPATIENT
Start: 2019-01-14 | End: 2019-01-14

## 2019-01-14 RX ORDER — HEPARIN SODIUM 10000 [USP'U]/ML
INJECTION, SOLUTION INTRAVENOUS; SUBCUTANEOUS
Status: DISCONTINUED | OUTPATIENT
Start: 2019-01-14 | End: 2019-01-14 | Stop reason: HOSPADM

## 2019-01-14 RX ORDER — CISATRACURIUM BESYLATE 10 MG/ML
INJECTION, SOLUTION INTRAVENOUS
Status: DISCONTINUED | OUTPATIENT
Start: 2019-01-14 | End: 2019-01-14

## 2019-01-14 RX ORDER — LORAZEPAM 2 MG/ML
0.25 INJECTION INTRAMUSCULAR ONCE AS NEEDED
Status: DISCONTINUED | OUTPATIENT
Start: 2019-01-14 | End: 2019-01-14 | Stop reason: HOSPADM

## 2019-01-14 RX ORDER — GLUCAGON 1 MG
1 KIT INJECTION CONTINUOUS PRN
Status: DISCONTINUED | OUTPATIENT
Start: 2019-01-14 | End: 2019-01-18 | Stop reason: HOSPADM

## 2019-01-14 RX ORDER — MYCOPHENOLATE MOFETIL 250 MG/1
500 CAPSULE ORAL 2 TIMES DAILY
Status: DISCONTINUED | OUTPATIENT
Start: 2019-01-14 | End: 2019-01-18 | Stop reason: HOSPADM

## 2019-01-14 RX ORDER — DIPHENHYDRAMINE HYDROCHLORIDE 50 MG/ML
50 INJECTION INTRAMUSCULAR; INTRAVENOUS ONCE
Status: COMPLETED | OUTPATIENT
Start: 2019-01-14 | End: 2019-01-14

## 2019-01-14 RX ORDER — CEFAZOLIN SODIUM 1 G/3ML
INJECTION, POWDER, FOR SOLUTION INTRAMUSCULAR; INTRAVENOUS
Status: DISCONTINUED | OUTPATIENT
Start: 2019-01-14 | End: 2019-01-14 | Stop reason: HOSPADM

## 2019-01-14 RX ORDER — TACROLIMUS 1 MG/1
2 CAPSULE ORAL 2 TIMES DAILY
Status: DISCONTINUED | OUTPATIENT
Start: 2019-01-14 | End: 2019-01-15

## 2019-01-14 RX ORDER — PHENYLEPHRINE HYDROCHLORIDE 10 MG/ML
INJECTION INTRAVENOUS
Status: DISCONTINUED | OUTPATIENT
Start: 2019-01-14 | End: 2019-01-14

## 2019-01-14 RX ORDER — NEOSTIGMINE METHYLSULFATE 1 MG/ML
INJECTION, SOLUTION INTRAVENOUS
Status: DISCONTINUED | OUTPATIENT
Start: 2019-01-14 | End: 2019-01-14

## 2019-01-14 RX ORDER — MUPIROCIN 20 MG/G
1 OINTMENT TOPICAL 2 TIMES DAILY
Status: DISCONTINUED | OUTPATIENT
Start: 2019-01-14 | End: 2019-01-18 | Stop reason: HOSPADM

## 2019-01-14 RX ORDER — HEPARIN SODIUM 5000 [USP'U]/ML
5000 INJECTION, SOLUTION INTRAVENOUS; SUBCUTANEOUS
Status: DISCONTINUED | OUTPATIENT
Start: 2019-01-14 | End: 2019-01-14

## 2019-01-14 RX ORDER — FAMOTIDINE 20 MG/1
20 TABLET, FILM COATED ORAL NIGHTLY
Status: DISCONTINUED | OUTPATIENT
Start: 2019-01-14 | End: 2019-01-17

## 2019-01-14 RX ORDER — FENTANYL CITRATE 50 UG/ML
INJECTION, SOLUTION INTRAMUSCULAR; INTRAVENOUS
Status: DISCONTINUED | OUTPATIENT
Start: 2019-01-14 | End: 2019-01-14

## 2019-01-14 RX ORDER — HYDROMORPHONE HCL IN 0.9% NACL 6 MG/30 ML
PATIENT CONTROLLED ANALGESIA SYRINGE INTRAVENOUS
Status: DISPENSED
Start: 2019-01-14 | End: 2019-01-15

## 2019-01-14 RX ORDER — CISATRACURIUM BESYLATE 2 MG/ML
INJECTION, SOLUTION INTRAVENOUS
Status: DISPENSED
Start: 2019-01-14 | End: 2019-01-15

## 2019-01-14 RX ORDER — BISACODYL 5 MG
10 TABLET, DELAYED RELEASE (ENTERIC COATED) ORAL NIGHTLY
Status: DISCONTINUED | OUTPATIENT
Start: 2019-01-14 | End: 2019-01-18

## 2019-01-14 RX ADMIN — CISATRACURIUM BESYLATE 16 MG: 10 INJECTION INTRAVENOUS at 02:01

## 2019-01-14 RX ADMIN — PROPOFOL 150 MG: 10 INJECTION, EMULSION INTRAVENOUS at 02:01

## 2019-01-14 RX ADMIN — LIDOCAINE HYDROCHLORIDE 80 MG: 20 INJECTION, SOLUTION INTRAVENOUS at 02:01

## 2019-01-14 RX ADMIN — ACETAMINOPHEN 650 MG: 650 SOLUTION ORAL at 02:01

## 2019-01-14 RX ADMIN — ONDANSETRON 4 MG: 2 INJECTION INTRAMUSCULAR; INTRAVENOUS at 05:01

## 2019-01-14 RX ADMIN — CISATRACURIUM BESYLATE 4 MG: 10 INJECTION INTRAVENOUS at 03:01

## 2019-01-14 RX ADMIN — NEOSTIGMINE METHYLSULFATE 5 MG: 1 INJECTION INTRAVENOUS at 05:01

## 2019-01-14 RX ADMIN — DIPHENHYDRAMINE HYDROCHLORIDE 50 MG: 50 INJECTION, SOLUTION INTRAMUSCULAR; INTRAVENOUS at 02:01

## 2019-01-14 RX ADMIN — METOPROLOL TARTRATE 1 MG: 5 INJECTION, SOLUTION INTRAVENOUS at 05:01

## 2019-01-14 RX ADMIN — GLYCOPYRROLATE 0.6 MG: 0.2 INJECTION, SOLUTION INTRAMUSCULAR; INTRAVENOUS at 05:01

## 2019-01-14 RX ADMIN — PHENYLEPHRINE HYDROCHLORIDE 100 MCG: 10 INJECTION INTRAVENOUS at 04:01

## 2019-01-14 RX ADMIN — SODIUM CHLORIDE 30 MG: 9 INJECTION, SOLUTION INTRAVENOUS at 03:01

## 2019-01-14 RX ADMIN — SODIUM CHLORIDE: 0.9 INJECTION, SOLUTION INTRAVENOUS at 01:01

## 2019-01-14 RX ADMIN — FUROSEMIDE 100 MG: 10 INJECTION, SOLUTION INTRAMUSCULAR; INTRAVENOUS at 03:01

## 2019-01-14 RX ADMIN — METHYLPREDNISOLONE SODIUM SUCCINATE 500 MG: 125 INJECTION, POWDER, FOR SOLUTION INTRAMUSCULAR; INTRAVENOUS at 02:01

## 2019-01-14 RX ADMIN — ONDANSETRON 4 MG: 2 INJECTION INTRAMUSCULAR; INTRAVENOUS at 10:01

## 2019-01-14 RX ADMIN — DEXTROSE AND SODIUM CHLORIDE: 5; .45 INJECTION, SOLUTION INTRAVENOUS at 06:01

## 2019-01-14 RX ADMIN — PHENYLEPHRINE HYDROCHLORIDE 200 MCG: 10 INJECTION INTRAVENOUS at 02:01

## 2019-01-14 RX ADMIN — FENTANYL CITRATE 50 MCG: 50 INJECTION, SOLUTION INTRAMUSCULAR; INTRAVENOUS at 03:01

## 2019-01-14 RX ADMIN — TACROLIMUS 2 MG: 1 CAPSULE ORAL at 08:01

## 2019-01-14 RX ADMIN — FAMOTIDINE 20 MG: 20 TABLET ORAL at 08:01

## 2019-01-14 RX ADMIN — SODIUM CHLORIDE 500 ML: 0.9 INJECTION, SOLUTION INTRAVENOUS at 06:01

## 2019-01-14 RX ADMIN — CEFAZOLIN 2 G: 330 INJECTION, POWDER, FOR SOLUTION INTRAMUSCULAR; INTRAVENOUS at 02:01

## 2019-01-14 RX ADMIN — NYSTATIN 500000 UNITS: 500000 SUSPENSION ORAL at 08:01

## 2019-01-14 RX ADMIN — PHENYLEPHRINE HYDROCHLORIDE 100 MCG: 10 INJECTION INTRAVENOUS at 03:01

## 2019-01-14 RX ADMIN — MIDAZOLAM HYDROCHLORIDE 2 MG: 1 INJECTION, SOLUTION INTRAMUSCULAR; INTRAVENOUS at 01:01

## 2019-01-14 RX ADMIN — FENTANYL CITRATE 100 MCG: 50 INJECTION, SOLUTION INTRAMUSCULAR; INTRAVENOUS at 02:01

## 2019-01-14 RX ADMIN — MANNITOL 25 G: 250 INJECTION, SOLUTION INTRAVENOUS at 03:01

## 2019-01-14 RX ADMIN — HEPARIN SODIUM 5000 UNITS: 5000 INJECTION, SOLUTION INTRAVENOUS; SUBCUTANEOUS at 11:01

## 2019-01-14 RX ADMIN — PROPOFOL 50 MG: 10 INJECTION, EMULSION INTRAVENOUS at 04:01

## 2019-01-14 RX ADMIN — DOCUSATE SODIUM 100 MG: 100 CAPSULE, LIQUID FILLED ORAL at 08:01

## 2019-01-14 RX ADMIN — FENTANYL CITRATE 50 MCG: 50 INJECTION, SOLUTION INTRAMUSCULAR; INTRAVENOUS at 04:01

## 2019-01-14 RX ADMIN — CISATRACURIUM BESYLATE 4 MG: 10 INJECTION INTRAVENOUS at 04:01

## 2019-01-14 RX ADMIN — BISACODYL 10 MG: 5 TABLET, COATED ORAL at 09:01

## 2019-01-14 RX ADMIN — Medication: at 06:01

## 2019-01-14 RX ADMIN — MYCOPHENOLATE MOFETIL 500 MG: 250 CAPSULE ORAL at 08:01

## 2019-01-14 RX ADMIN — ESMOLOL HYDROCHLORIDE 10 MG: 10 INJECTION INTRAVENOUS at 05:01

## 2019-01-14 RX ADMIN — CEFAZOLIN 2 G: 330 INJECTION, POWDER, FOR SOLUTION INTRAMUSCULAR; INTRAVENOUS at 11:01

## 2019-01-14 RX ADMIN — MUPIROCIN 1 G: 20 OINTMENT TOPICAL at 08:01

## 2019-01-14 NOTE — INTERVAL H&P NOTE
The patient has been examined and the H&P has been reviewed:    I concur with the findings and no changes have occurred since H&P was written.    Anesthesia/Surgery risks, benefits and alternative options discussed and understood by patient/family.          Active Hospital Problems    Diagnosis  POA    ESRD on dialysis [N18.6, Z99.2]  Not Applicable      Resolved Hospital Problems   No resolved problems to display.     Day Bolivar MD, PGY-3  General Surgery  [phone]

## 2019-01-14 NOTE — OP NOTE
Operative Report    Date of Procedure: 1/14/2019    Surgeons:  Surgeon(s) and Role:     * Roland Salazar MD - Primary     * Day Bolivar MD - Resident - Assisting    First Assistant Attestation:  The indicated resident served as first assistant for this procedure.    Pre-operative Diagnosis: CKD5, dialysis not yet started  secondary to Diabetes Mellitus - Type II  Post-operative Diagnosis: Same    Procedure(s) Performed:   1. Back Table Preparation of Left Kidney    2. Living Kidney transplant    Anesthesia: General endotracheal    Preamble  Indications and Patient Counseling: The patient is a 58 y.o. year-old female with end-stage kidney disease secondary to Diabetes Mellitus - Type II who has been evaluated for a kidney transplant.  The procedure was thoroughly discussed with the patient, including potential risks, complications, and alternatives.  Specific complications mentioned included death, graft non-function, bleeding, infection, and rejection, as well as the possibility of other complications not specifically mentioned.    Donor Risk Factors: Prior to the operation, the patient was advised of any donor-specific risk factors requiring specific disclosure.  Factors in this case included nothing that required specific disclosure.      Specific PHS Increased Risk Behavior criteria for the organ donor include:  None    All questions were answered, the patient voiced appropriate understanding, and she agreed to proceed with the planned procedure.    ABO Confirmation: Immediately following arrival of the donor organ and prior to implantation, a formal ABO confirmation was done according to hospital and UNOS policies.  I confirmed the UNOS ID number ([id number]) of the donor organ and the donor and recipient ABO types, directly verifying these data by comparison with the UNOS Match Run report ().  This confirmation was personally done by an attending surgeon and circulating nurse, and is officially documented  elsewhere.    Time-Out: A complete time out was carried out prior to incision, with confirmation of patient identity, correct procedure, correct operative site, appropriate antibiotic prophylaxis, review of any known allergies, and presence of all needed equipment.    Procedure in Detail  Prior to starting the operation, the left kidney  was prepared on the back table. Arterial anatomy was single. Venous anatomy was single. Ureteral anatomy was single. Back table vascular reconstruction was not required .  Unneeded fat was removed from the kidney, the vessels were cleaned of adherent tissue and tested for leaks, and the kidney was maintained at ice temperature in organ preservation solution until it was brought to the operative field.     The patient was brought into the operating room and placed in a supine position on the OR table.  After the induction of general endotracheal anesthesia, lines were placed by the anesthesiologist.  The urinary bladder was catheterized and irrigated with antibiotic solution.  There was no tension on the axillae and all pressure points were padded.  Sequential compression boots were used as were Reyna Huggers.  The abdomen was prepped and draped in the usual sterile fashion.  Skin was incised over the right with a knife and deepened with electrocautery.  The peritoneum and its contents were swept medially, exposing the right external iliac artery and the right external iliac vein.  The Bookwalter retractor was used to provide exposure.  Overlying lymphatics were ligated or cauterized and the vessels were dissected free for a length compatible with anastomosis.  The kidney was brought to the OR table at 1/14/2019  3:44 PM.  Venous control was obtained with a vascular clamp.  A venotomy was made, the vein irrigated, and an end renal to right external iliac vein anastomosis was created with 5-0 polypropylene.  Arterial control was obtained with a vascular clamp.  Arteriotomy was made,  the artery irrigated, and an end renal to right external iliac artery anastomosis was created with 5-0 polypropylene.  The kidney was unclamped and reperfused at 1/14/2019  4:05 PM.  Reperfusion quality was good. Intraoperative urine production was observed.  After hemostasis was obtained, a Lich uretero-neocystostomy was created.  The bladder was filled and identified, opened, and the anastomosis created using 6-0 PDS.  The bladder muscle was closed over the distal ureter to create an antireflux tunnel.  A ureteral stent was used.  With the kidney well perfused and sitting appropriately without tension on the anastomoses, viscera were replaced in their usual position.  The wound was closed after a final check for hemostasis.  Overall, the graft quality was assessed to be good. At the end of the case the needle, sponge and instrument counts were all correct.  Sterile dressings were applied and the patient was brought to the recovery room/ICU in good condition.    Estimated Blood Loss: 62 mL  Fluids Administered:    Drains: 0  Specimens: none    Findings:    Organ Transplanted: Left Kidney    Arterial Anatomy: single  Number of Arteries: 1  Configuration of Multiple Arteries: not applicable  Venous Anatomy: single  Number of Veins: 1  Ureteral Anatomy: single  Number of Ureters: 1  Reperfusion Quality: good  Overall Graft Quality: good  Intraoperative Urine Production: yes  Grace: not to be removed before 3 days.  Ureteral Stent: Yes    Ischemic Times:   Anastomosis (warm ischemia) time: 21 minutes   Cold ischemia time: 37 minutes  Total ischemia time: 58 minutes    Donor Data:  UNOS ID: [id number]   UNOS Match Run:    Donor Type: Living   Donor CMV Status:    Donor HBcAB:    Donor HCV Status:

## 2019-01-14 NOTE — PLAN OF CARE
Pt prepared for procedure.    Pt resting comfortably in room, call light within reach.    Needs Anesthesia consent, H&P update

## 2019-01-14 NOTE — TELEPHONE ENCOUNTER
Received call from Manish in HLA and sample was ran today.  Patient does not have any current DSA with her donor.

## 2019-01-14 NOTE — ANESTHESIA PROCEDURE NOTES
Arterial    Diagnosis: esrd  Doctor requesting consult: Martin    Patient location during procedure: done in OR  Procedure start time: 1/14/2019 2:30 PM  Timeout: 1/14/2019 2:30 PM  Procedure end time: 1/14/2019 2:35 PM  Staffing  Anesthesiologist: Glenn Yousfi MD  Resident/CRNA: Susi Yip CRNA  Performed: resident/CRNA   Anesthesiologist was present at the time of the procedure.  Preanesthetic Checklist  Completed: patient identified, site marked, surgical consent, pre-op evaluation, timeout performed, IV checked, risks and benefits discussed, monitors and equipment checked and anesthesia consent givenArterial  Skin Prep: chlorhexidine gluconate  Local Infiltration: none  Orientation: right  Location: radial  Catheter Size: 20 G  Catheter placement by Anatomical landmarks. Heme positive aspiration all ports.

## 2019-01-15 ENCOUNTER — RESEARCH ENCOUNTER (OUTPATIENT)
Dept: RESEARCH | Facility: CLINIC | Age: 59
End: 2019-01-15
Payer: MEDICARE

## 2019-01-15 DIAGNOSIS — Z94.0 STATUS POST LIVING-DONOR KIDNEY TRANSPLANTATION: ICD-10-CM

## 2019-01-15 DIAGNOSIS — Z00.6 EXAM FOR CLINICAL RESEARCH: Primary | ICD-10-CM

## 2019-01-15 PROBLEM — E87.20 METABOLIC ACIDOSIS: Status: ACTIVE | Noted: 2019-01-15

## 2019-01-15 PROBLEM — Z78.9 PRESENCE OF SURGICAL INCISION: Status: ACTIVE | Noted: 2019-01-15

## 2019-01-15 PROBLEM — T38.0X5S ADVERSE EFFECT OF ADRENAL CORTICAL STEROIDS, SEQUELA: Status: ACTIVE | Noted: 2019-01-15

## 2019-01-15 PROBLEM — Z79.60 LONG-TERM USE OF IMMUNOSUPPRESSANT MEDICATION: Status: ACTIVE | Noted: 2019-01-15

## 2019-01-15 PROBLEM — Z91.89 AT RISK FOR OPPORTUNISTIC INFECTIONS: Status: ACTIVE | Noted: 2019-01-15

## 2019-01-15 PROBLEM — E55.9 VITAMIN D DEFICIENCY: Status: ACTIVE | Noted: 2019-01-15

## 2019-01-15 PROBLEM — Z29.89 PROPHYLACTIC IMMUNOTHERAPY: Status: ACTIVE | Noted: 2017-09-06

## 2019-01-15 LAB
ALBUMIN SERPL BCP-MCNC: 2.9 G/DL
ALBUMIN SERPL BCP-MCNC: 3.1 G/DL
ALBUMIN SERPL BCP-MCNC: 3.1 G/DL
ALP SERPL-CCNC: 126 U/L
ALP SERPL-CCNC: 142 U/L
ALT SERPL W/O P-5'-P-CCNC: 19 U/L
ALT SERPL W/O P-5'-P-CCNC: 23 U/L
ANION GAP SERPL CALC-SCNC: 7 MMOL/L
ANION GAP SERPL CALC-SCNC: 8 MMOL/L
AST SERPL-CCNC: 31 U/L
AST SERPL-CCNC: 36 U/L
BASOPHILS # BLD AUTO: 0.02 K/UL
BASOPHILS # BLD AUTO: 0.03 K/UL
BASOPHILS NFR BLD: 0.1 %
BASOPHILS NFR BLD: 0.1 %
BILIRUB DIRECT SERPL-MCNC: 0.1 MG/DL
BILIRUB SERPL-MCNC: 0.2 MG/DL
BILIRUB SERPL-MCNC: 0.3 MG/DL
BUN SERPL-MCNC: 42 MG/DL
BUN SERPL-MCNC: 57 MG/DL
CALCIUM SERPL-MCNC: 8.2 MG/DL
CALCIUM SERPL-MCNC: 8.3 MG/DL
CHLORIDE SERPL-SCNC: 114 MMOL/L
CHLORIDE SERPL-SCNC: 117 MMOL/L
CO2 SERPL-SCNC: 17 MMOL/L
CO2 SERPL-SCNC: 18 MMOL/L
CREAT SERPL-MCNC: 1.4 MG/DL
CREAT SERPL-MCNC: 2.3 MG/DL
DIFFERENTIAL METHOD: ABNORMAL
DIFFERENTIAL METHOD: ABNORMAL
EOSINOPHIL # BLD AUTO: 0 K/UL
EOSINOPHIL # BLD AUTO: 0 K/UL
EOSINOPHIL NFR BLD: 0 %
EOSINOPHIL NFR BLD: 0 %
ERYTHROCYTE [DISTWIDTH] IN BLOOD BY AUTOMATED COUNT: 13.2 %
ERYTHROCYTE [DISTWIDTH] IN BLOOD BY AUTOMATED COUNT: 13.2 %
EST. GFR  (AFRICAN AMERICAN): 25.8 ML/MIN/1.73 M^2
EST. GFR  (AFRICAN AMERICAN): 47.1 ML/MIN/1.73 M^2
EST. GFR  (NON AFRICAN AMERICAN): 22.4 ML/MIN/1.73 M^2
EST. GFR  (NON AFRICAN AMERICAN): 40.9 ML/MIN/1.73 M^2
GLUCOSE SERPL-MCNC: 197 MG/DL
GLUCOSE SERPL-MCNC: 292 MG/DL
HCT VFR BLD AUTO: 29.5 %
HCT VFR BLD AUTO: 32.6 %
HGB BLD-MCNC: 10 G/DL
HGB BLD-MCNC: 9.5 G/DL
IMM GRANULOCYTES # BLD AUTO: 0.14 K/UL
IMM GRANULOCYTES # BLD AUTO: 0.18 K/UL
IMM GRANULOCYTES NFR BLD AUTO: 0.6 %
IMM GRANULOCYTES NFR BLD AUTO: 0.6 %
LDH SERPL L TO P-CCNC: 476 U/L
LYMPHOCYTES # BLD AUTO: 0 K/UL
LYMPHOCYTES # BLD AUTO: 0 K/UL
LYMPHOCYTES NFR BLD: 0.1 %
LYMPHOCYTES NFR BLD: 0.1 %
MAGNESIUM SERPL-MCNC: 1.9 MG/DL
MCH RBC QN AUTO: 30.1 PG
MCH RBC QN AUTO: 30.3 PG
MCHC RBC AUTO-ENTMCNC: 30.7 G/DL
MCHC RBC AUTO-ENTMCNC: 32.2 G/DL
MCV RBC AUTO: 94 FL
MCV RBC AUTO: 98 FL
MONOCYTES # BLD AUTO: 0.2 K/UL
MONOCYTES # BLD AUTO: 0.4 K/UL
MONOCYTES NFR BLD: 0.7 %
MONOCYTES NFR BLD: 1.5 %
NEUTROPHILS # BLD AUTO: 24.4 K/UL
NEUTROPHILS # BLD AUTO: 27.9 K/UL
NEUTROPHILS NFR BLD: 97.7 %
NEUTROPHILS NFR BLD: 98.5 %
NRBC BLD-RTO: 0 /100 WBC
NRBC BLD-RTO: 0 /100 WBC
PHOSPHATE SERPL-MCNC: 4.8 MG/DL
PLATELET # BLD AUTO: 138 K/UL
PLATELET # BLD AUTO: 173 K/UL
PLATELET BLD QL SMEAR: ABNORMAL
PMV BLD AUTO: 10.2 FL
PMV BLD AUTO: 9.7 FL
POCT GLUCOSE: 126 MG/DL (ref 70–110)
POCT GLUCOSE: 151 MG/DL (ref 70–110)
POCT GLUCOSE: 254 MG/DL (ref 70–110)
POTASSIUM SERPL-SCNC: 4.5 MMOL/L
POTASSIUM SERPL-SCNC: 5.1 MMOL/L
PROT SERPL-MCNC: 6.5 G/DL
PROT SERPL-MCNC: 6.6 G/DL
RBC # BLD AUTO: 3.14 M/UL
RBC # BLD AUTO: 3.32 M/UL
SODIUM SERPL-SCNC: 139 MMOL/L
SODIUM SERPL-SCNC: 142 MMOL/L
TACROLIMUS BLD-MCNC: 6.9 NG/ML
WBC # BLD AUTO: 24.93 K/UL
WBC # BLD AUTO: 28.34 K/UL

## 2019-01-15 PROCEDURE — 99233 SBSQ HOSP IP/OBS HIGH 50: CPT | Mod: ,,, | Performed by: PHYSICIAN ASSISTANT

## 2019-01-15 PROCEDURE — S5571 INSULIN DISPOS PEN 3 ML: HCPCS | Performed by: NURSE PRACTITIONER

## 2019-01-15 PROCEDURE — 84075 ASSAY ALKALINE PHOSPHATASE: CPT

## 2019-01-15 PROCEDURE — 80069 RENAL FUNCTION PANEL: CPT

## 2019-01-15 PROCEDURE — 25000003 PHARM REV CODE 250: Performed by: PHYSICIAN ASSISTANT

## 2019-01-15 PROCEDURE — 83615 LACTATE (LD) (LDH) ENZYME: CPT

## 2019-01-15 PROCEDURE — 63600175 PHARM REV CODE 636 W HCPCS: Performed by: NURSE PRACTITIONER

## 2019-01-15 PROCEDURE — 82247 BILIRUBIN TOTAL: CPT

## 2019-01-15 PROCEDURE — 99223 1ST HOSP IP/OBS HIGH 75: CPT | Mod: ,,, | Performed by: NURSE PRACTITIONER

## 2019-01-15 PROCEDURE — 63600175 PHARM REV CODE 636 W HCPCS: Performed by: STUDENT IN AN ORGANIZED HEALTH CARE EDUCATION/TRAINING PROGRAM

## 2019-01-15 PROCEDURE — 80053 COMPREHEN METABOLIC PANEL: CPT

## 2019-01-15 PROCEDURE — 99213 PR OFFICE/OUTPT VISIT, EST, LEVL III, 20-29 MIN: ICD-10-PCS | Mod: S$PBB,,, | Performed by: CLINICAL NURSE SPECIALIST

## 2019-01-15 PROCEDURE — 99223 PR INITIAL HOSPITAL CARE,LEVL III: ICD-10-PCS | Mod: ,,, | Performed by: NURSE PRACTITIONER

## 2019-01-15 PROCEDURE — 85025 COMPLETE CBC W/AUTO DIFF WBC: CPT

## 2019-01-15 PROCEDURE — 25000003 PHARM REV CODE 250: Performed by: SURGERY

## 2019-01-15 PROCEDURE — 63600175 PHARM REV CODE 636 W HCPCS: Performed by: SURGERY

## 2019-01-15 PROCEDURE — 99233 PR SUBSEQUENT HOSPITAL CARE,LEVL III: ICD-10-PCS | Mod: ,,, | Performed by: PHYSICIAN ASSISTANT

## 2019-01-15 PROCEDURE — 99900035 HC TECH TIME PER 15 MIN (STAT)

## 2019-01-15 PROCEDURE — 83735 ASSAY OF MAGNESIUM: CPT

## 2019-01-15 PROCEDURE — 20600001 HC STEP DOWN PRIVATE ROOM

## 2019-01-15 PROCEDURE — 80197 ASSAY OF TACROLIMUS: CPT

## 2019-01-15 PROCEDURE — 63600175 PHARM REV CODE 636 W HCPCS: Performed by: PHYSICIAN ASSISTANT

## 2019-01-15 PROCEDURE — 25000003 PHARM REV CODE 250: Performed by: NURSE PRACTITIONER

## 2019-01-15 PROCEDURE — 99213 OFFICE O/P EST LOW 20 MIN: CPT | Mod: S$PBB,,, | Performed by: CLINICAL NURSE SPECIALIST

## 2019-01-15 RX ORDER — SULFAMETHOXAZOLE AND TRIMETHOPRIM 400; 80 MG/1; MG/1
1 TABLET ORAL DAILY
Qty: 30 TABLET | Refills: 11 | Status: SHIPPED | OUTPATIENT
Start: 2019-01-15 | End: 2019-01-25 | Stop reason: SINTOL

## 2019-01-15 RX ORDER — INSULIN ASPART 100 [IU]/ML
5 INJECTION, SOLUTION INTRAVENOUS; SUBCUTANEOUS ONCE
Status: COMPLETED | OUTPATIENT
Start: 2019-01-15 | End: 2019-01-15

## 2019-01-15 RX ORDER — MAG HYDROX/ALUMINUM HYD/SIMETH 200-200-20
30 SUSPENSION, ORAL (FINAL DOSE FORM) ORAL EVERY 6 HOURS PRN
Status: DISCONTINUED | OUTPATIENT
Start: 2019-01-15 | End: 2019-01-18 | Stop reason: HOSPADM

## 2019-01-15 RX ORDER — INSULIN ASPART 100 [IU]/ML
7 INJECTION, SOLUTION INTRAVENOUS; SUBCUTANEOUS
Status: DISCONTINUED | OUTPATIENT
Start: 2019-01-16 | End: 2019-01-18

## 2019-01-15 RX ORDER — VALGANCICLOVIR 450 MG/1
450 TABLET, FILM COATED ORAL DAILY
Status: DISCONTINUED | OUTPATIENT
Start: 2019-01-25 | End: 2019-01-18 | Stop reason: HOSPADM

## 2019-01-15 RX ORDER — SODIUM BICARBONATE 650 MG/1
1300 TABLET ORAL 2 TIMES DAILY
Status: DISCONTINUED | OUTPATIENT
Start: 2019-01-15 | End: 2019-01-17

## 2019-01-15 RX ORDER — BISACODYL 5 MG
10 TABLET, DELAYED RELEASE (ENTERIC COATED) ORAL NIGHTLY
Refills: 0 | COMMUNITY
Start: 2019-01-15 | End: 2019-01-28

## 2019-01-15 RX ORDER — ACETAMINOPHEN 325 MG/1
650 TABLET ORAL EVERY 6 HOURS PRN
Status: DISCONTINUED | OUTPATIENT
Start: 2019-01-15 | End: 2019-01-18 | Stop reason: HOSPADM

## 2019-01-15 RX ORDER — INSULIN ASPART 100 [IU]/ML
0-5 INJECTION, SOLUTION INTRAVENOUS; SUBCUTANEOUS
Status: DISCONTINUED | OUTPATIENT
Start: 2019-01-15 | End: 2019-01-18 | Stop reason: HOSPADM

## 2019-01-15 RX ORDER — TACROLIMUS 1 MG/1
1 CAPSULE ORAL 2 TIMES DAILY
Status: DISCONTINUED | OUTPATIENT
Start: 2019-01-15 | End: 2019-01-16

## 2019-01-15 RX ORDER — VILAZODONE HYDROCHLORIDE 10 MG/1
40 TABLET ORAL DAILY
Status: DISCONTINUED | OUTPATIENT
Start: 2019-01-15 | End: 2019-01-18 | Stop reason: HOSPADM

## 2019-01-15 RX ORDER — DIPHENHYDRAMINE HCL 25 MG
25 CAPSULE ORAL EVERY 6 HOURS PRN
Status: DISCONTINUED | OUTPATIENT
Start: 2019-01-15 | End: 2019-01-18 | Stop reason: HOSPADM

## 2019-01-15 RX ORDER — ONDANSETRON 4 MG/1
8 TABLET, FILM COATED ORAL EVERY 8 HOURS PRN
Status: DISCONTINUED | OUTPATIENT
Start: 2019-01-15 | End: 2019-01-18 | Stop reason: HOSPADM

## 2019-01-15 RX ORDER — OXYCODONE HYDROCHLORIDE 5 MG/1
5 TABLET ORAL EVERY 4 HOURS PRN
Status: DISCONTINUED | OUTPATIENT
Start: 2019-01-15 | End: 2019-01-18 | Stop reason: HOSPADM

## 2019-01-15 RX ORDER — DOCUSATE SODIUM 100 MG/1
100 CAPSULE, LIQUID FILLED ORAL 3 TIMES DAILY
Refills: 0 | COMMUNITY
Start: 2019-01-15 | End: 2019-01-28

## 2019-01-15 RX ORDER — OXYCODONE AND ACETAMINOPHEN 5; 325 MG/1; MG/1
1 TABLET ORAL EVERY 4 HOURS PRN
Status: DISCONTINUED | OUTPATIENT
Start: 2019-01-15 | End: 2019-01-15

## 2019-01-15 RX ORDER — METHYLPREDNISOLONE SOD SUCC 125 MG
125 VIAL (EA) INJECTION DAILY
Status: COMPLETED | OUTPATIENT
Start: 2019-01-16 | End: 2019-01-16

## 2019-01-15 RX ORDER — ERGOCALCIFEROL 1.25 MG/1
50000 CAPSULE ORAL
Qty: 4 CAPSULE | Refills: 2 | Status: SHIPPED | OUTPATIENT
Start: 2019-01-22 | End: 2019-05-31 | Stop reason: SDUPTHER

## 2019-01-15 RX ORDER — MAG HYDROX/ALUMINUM HYD/SIMETH 200-200-20
30 SUSPENSION, ORAL (FINAL DOSE FORM) ORAL
Status: DISCONTINUED | OUTPATIENT
Start: 2019-01-15 | End: 2019-01-15

## 2019-01-15 RX ORDER — MYCOPHENOLATE MOFETIL 250 MG/1
500 CAPSULE ORAL 2 TIMES DAILY
Qty: 120 CAPSULE | Refills: 11 | Status: ON HOLD | OUTPATIENT
Start: 2019-01-15 | End: 2019-02-07 | Stop reason: HOSPADM

## 2019-01-15 RX ORDER — ERGOCALCIFEROL 1.25 MG/1
50000 CAPSULE ORAL
Status: DISCONTINUED | OUTPATIENT
Start: 2019-01-15 | End: 2019-01-18 | Stop reason: HOSPADM

## 2019-01-15 RX ORDER — FAMOTIDINE 20 MG/1
20 TABLET, FILM COATED ORAL NIGHTLY
Qty: 30 TABLET | Refills: 11 | Status: SHIPPED | OUTPATIENT
Start: 2019-01-15 | End: 2019-01-17

## 2019-01-15 RX ORDER — SODIUM BICARBONATE 650 MG/1
1300 TABLET ORAL 2 TIMES DAILY
Qty: 120 TABLET | Refills: 11 | Status: SHIPPED | OUTPATIENT
Start: 2019-01-15 | End: 2019-01-17

## 2019-01-15 RX ORDER — OXYCODONE HYDROCHLORIDE 10 MG/1
10 TABLET ORAL EVERY 4 HOURS PRN
Status: DISCONTINUED | OUTPATIENT
Start: 2019-01-15 | End: 2019-01-18 | Stop reason: HOSPADM

## 2019-01-15 RX ORDER — VALGANCICLOVIR 450 MG/1
900 TABLET, FILM COATED ORAL DAILY
Qty: 60 TABLET | Refills: 5 | Status: SHIPPED | OUTPATIENT
Start: 2019-01-15 | End: 2019-01-18 | Stop reason: HOSPADM

## 2019-01-15 RX ORDER — TACROLIMUS 1 MG/1
6 CAPSULE ORAL EVERY 12 HOURS
Qty: 360 CAPSULE | Refills: 11 | Status: SHIPPED | OUTPATIENT
Start: 2019-01-15 | End: 2019-01-17

## 2019-01-15 RX ADMIN — HEPARIN SODIUM 5000 UNITS: 5000 INJECTION, SOLUTION INTRAVENOUS; SUBCUTANEOUS at 08:01

## 2019-01-15 RX ADMIN — INSULIN ASPART 3 UNITS: 100 INJECTION, SOLUTION INTRAVENOUS; SUBCUTANEOUS at 09:01

## 2019-01-15 RX ADMIN — SULFAMETHOXAZOLE AND TRIMETHOPRIM 1 TABLET: 400; 80 TABLET ORAL at 08:01

## 2019-01-15 RX ADMIN — MUPIROCIN 1 G: 20 OINTMENT TOPICAL at 08:01

## 2019-01-15 RX ADMIN — OXYCODONE HYDROCHLORIDE 10 MG: 10 TABLET ORAL at 04:01

## 2019-01-15 RX ADMIN — INSULIN ASPART 2 UNITS: 100 INJECTION, SOLUTION INTRAVENOUS; SUBCUTANEOUS at 02:01

## 2019-01-15 RX ADMIN — MYCOPHENOLATE MOFETIL 500 MG: 250 CAPSULE ORAL at 08:01

## 2019-01-15 RX ADMIN — HEPARIN SODIUM 5000 UNITS: 5000 INJECTION, SOLUTION INTRAVENOUS; SUBCUTANEOUS at 06:01

## 2019-01-15 RX ADMIN — VILAZODONE HYDROCHLORIDE 40 MG: 10 TABLET ORAL at 08:01

## 2019-01-15 RX ADMIN — ERGOCALCIFEROL 50000 UNITS: 1.25 CAPSULE ORAL at 12:01

## 2019-01-15 RX ADMIN — FAMOTIDINE 20 MG: 20 TABLET ORAL at 08:01

## 2019-01-15 RX ADMIN — SODIUM BICARBONATE 650 MG TABLET 1300 MG: at 08:01

## 2019-01-15 RX ADMIN — OXYCODONE HYDROCHLORIDE 10 MG: 10 TABLET ORAL at 12:01

## 2019-01-15 RX ADMIN — ALUMINUM HYDROXIDE, MAGNESIUM HYDROXIDE, AND SIMETHICONE 30 ML: 200; 200; 20 SUSPENSION ORAL at 05:01

## 2019-01-15 RX ADMIN — CEFAZOLIN 2 G: 330 INJECTION, POWDER, FOR SOLUTION INTRAMUSCULAR; INTRAVENOUS at 07:01

## 2019-01-15 RX ADMIN — HEPARIN SODIUM 5000 UNITS: 5000 INJECTION, SOLUTION INTRAVENOUS; SUBCUTANEOUS at 02:01

## 2019-01-15 RX ADMIN — BISACODYL 10 MG: 5 TABLET, COATED ORAL at 08:01

## 2019-01-15 RX ADMIN — INSULIN DETEMIR 20 UNITS: 100 INJECTION, SOLUTION SUBCUTANEOUS at 08:01

## 2019-01-15 RX ADMIN — DEXTROSE 250 MG: 50 INJECTION, SOLUTION INTRAVENOUS at 07:01

## 2019-01-15 RX ADMIN — DOCUSATE SODIUM 100 MG: 100 CAPSULE, LIQUID FILLED ORAL at 08:01

## 2019-01-15 RX ADMIN — NYSTATIN 500000 UNITS: 500000 SUSPENSION ORAL at 08:01

## 2019-01-15 RX ADMIN — LEVOTHYROXINE SODIUM 75 MCG: 25 TABLET ORAL at 12:01

## 2019-01-15 RX ADMIN — TACROLIMUS 2 MG: 1 CAPSULE ORAL at 08:01

## 2019-01-15 RX ADMIN — OXYCODONE HYDROCHLORIDE 10 MG: 10 TABLET ORAL at 08:01

## 2019-01-15 RX ADMIN — TACROLIMUS 1 MG: 1 CAPSULE ORAL at 05:01

## 2019-01-15 RX ADMIN — INSULIN ASPART 5 UNITS: 100 INJECTION, SOLUTION INTRAVENOUS; SUBCUTANEOUS at 07:01

## 2019-01-15 RX ADMIN — SODIUM BICARBONATE 650 MG TABLET 1300 MG: at 12:01

## 2019-01-15 RX ADMIN — DIPHENHYDRAMINE HYDROCHLORIDE 25 MG: 25 CAPSULE ORAL at 02:01

## 2019-01-15 RX ADMIN — THERA TABS 1 TABLET: TAB at 08:01

## 2019-01-15 RX ADMIN — DOCUSATE SODIUM 100 MG: 100 CAPSULE, LIQUID FILLED ORAL at 02:01

## 2019-01-15 RX ADMIN — NYSTATIN 500000 UNITS: 500000 SUSPENSION ORAL at 05:01

## 2019-01-15 RX ADMIN — NYSTATIN 500000 UNITS: 500000 SUSPENSION ORAL at 02:01

## 2019-01-15 RX ADMIN — DIPHENHYDRAMINE HYDROCHLORIDE 25 MG: 25 CAPSULE ORAL at 08:01

## 2019-01-15 NOTE — PLAN OF CARE
Problem: Adult Inpatient Plan of Care  Goal: Plan of Care Review  Outcome: Ongoing (interventions implemented as appropriate)  Pt AAOx4, VSS, afebrile.  Pain controlled with PCA diluadid pump.  Significant other remain at pt bedside.  Pt verbalize understanding to call when needed assistance. Call light within reach.  Will continue to monitor.        Problem: Fall Injury Risk  Goal: Absence of Fall and Fall-Related Injury  Outcome: Ongoing (interventions implemented as appropriate)  Fall risk precautions initiated.  Pt in lowest bed position setting, lighting adjusted, pt to wear nonskid socks when ambulating, side rails up x2.  Pt remain free from falls during shift. Pt verbalized understanding to call when needing assistance.      Problem: Infection  Goal: Infection Symptom Resolution  Outcome: Ongoing (interventions implemented as appropriate)  Surgical dressing to kidney incision CDI.  Urinary bull in place, off ground, emptied q1h.  Central line dressing CDI.      Problem: Diabetes Comorbidity  Goal: Blood Glucose Level Within Desired Range  Outcome: Ongoing (interventions implemented as appropriate)  Blood sugar at bedtime 189.  No insulin administered.      Problem: Hypertension Comorbidity  Goal: Blood Pressure in Desired Range  Outcome: Ongoing (interventions implemented as appropriate)  Blood pressure within normal limits throughout night.  CVP Q2H ranging 5-8.      Problem: Obstructive Sleep Apnea Risk or Actual (Comorbidity Management)  Goal: Unobstructed Breathing During Sleep  Outcome: Ongoing (interventions implemented as appropriate)  Pt resting with CPAP on overnight.  Mild discomfort but pt able to tolerate nasal mask.

## 2019-01-15 NOTE — HPI
"Reason for Consult: Management of T2DM, Hyperglycemia     Surgical Procedure and Date: OKTx 1/14/19    Diabetes diagnosis year: 2005    Home Diabetes Medications:     Basaglar 80 units BID    How often checking glucose at home? "only when I am feeling bad."    BG readings on regimen: 200's (when she is feeling bad)  Hypoglycemia on the regimen?  Unknown  Missed doses on regimen?  Misses nightly dose around 2 x month.     Diabetes Complications include:     Hyperglycemia, Hypoglycemia unawareness and Diabetic peripheral neuropathy     Complicating diabetes co morbidities:   ESRD, Thyroid disease, Obesity       HPI:   Patient is a 58 y.o. female with a diagnosis of ESRD, DM2, Thyroid Disease, and Obesity. She is s/p OKTx 1/14/19. Endocrinology consulted for management of hyperglycemia/DM. Patient's PCP (in [place], [place]) manages her DM.     Lab Results   Component Value Date    HGBA1C 6.6 (H) 01/09/2019       "

## 2019-01-15 NOTE — HOSPITAL COURSE
"Interval History:   Pt c/o constant indigestion "burning chest pain" overnight that started after she ate mac & cheese for dinner last night. EKG this AM NSR. Troponin negative. Pain unrelieved by tums last night, but somewhat relieved by Maalox. Increased Pepsid to twice daily before meals. Grace removed this AM. Voiding without difficulty. Cr stable at 1.0 , 3.1 L UOP past 24h. Only 1.2 L PO intake documented past 24h, but pt & caregiver report pt drinking 2-3 L water yesterday. Incisional pain well controlled. (+) flatus, (-) BM, continue bowel regimen. Added miralax and suppository. Ambulated in [place] with PT/OT yesterday, scooter brought to bedside. Plan for d/c with  PT/OT tomorrow, pending improvement in indigestion.     "

## 2019-01-15 NOTE — CONSULTS
"[facility]  Endocrinology  Diabetes Consult Note    Consult Requested by: Jose Bettencourt MD   Reason for admit: Status post living-donor kidney transplantation    HISTORY OF PRESENT ILLNESS:  Reason for Consult: Management of T2DM, Hyperglycemia     Surgical Procedure and Date: OKTx 19    Diabetes diagnosis year:     Home Diabetes Medications:     Basaglar 80 units BID    How often checking glucose at home? "only when I am feeling bad."    BG readings on regimen: 200's (when she is feeling bad)  Hypoglycemia on the regimen?  Unknown  Missed doses on regimen?  Misses nightly dose around 2 x month.     Diabetes Complications include:     Hyperglycemia, Hypoglycemia unawareness and Diabetic peripheral neuropathy     Complicating diabetes co morbidities:   ESRD, Thyroid disease, Obesity       HPI:   Patient is a 58 y.o. female with a diagnosis of ESRD, DM2, Thyroid Disease, and Obesity. She is s/p OKTx 19. Endocrinology consulted for management of hyperglycemia/DM. Patient's PCP (in [place], [place]) manages her DM.     Lab Results   Component Value Date    HGBA1C 6.6 (H) 2019       Interval HPI:   Overnight events:      Is now in TSU. NAEON. Diet has advanced.  BG is above goal at time of consult.     Eatin%  Nausea: No  Hypoglycemia and intervention: No  Fever: No  TPN and/or TF: No  If yes, type of TF/TPN and rate: None    PMH, PSH, FH, SH reviewed     Review of Systems   Constitutional: Positive for weight changes. Loss of 20lbs prior to surgery.  Eyes: Negative for visual disturbance.  Respiratory: Negative for cough.   Cardiovascular: Positive for chest pain. Complains of GERD symptoms.  Gastrointestinal: Positive for nausea.  Endocrine: Positive for polydipsia.  Musculoskeletal: Positive for chronic back pain.  Skin: Negative for rash.  Neurological: Negative for syncope.  Psychiatric/Behavioral: Negative for depression.      Current Medications and/or Treatments " Impacting Glycemic Control  Immunotherapy:    Immunosuppressants         Stop Route Frequency     mycophenolate capsule 500 mg      -- Oral 2 times daily     tacrolimus capsule 2 mg      -- Oral 2 times daily        Steroids:   Hormones (From admission, onward)    None        Pressors:    Autonomic Drugs (From admission, onward)    Start     Stop Route Frequency Ordered    01/14/19 1229  cisatracurium (NIMBEX) 2 mg/mL injection     Comments:  Created by cabinet override    01/15 0044 01/14/19 1229        Hyperglycemia/Diabetes Medications:   Antihyperglycemics (From admission, onward)    Start     Stop Route Frequency Ordered    01/15/19 0952  insulin aspart U-100 pen 0-5 Units      -- SubQ Before meals & nightly PRN 01/15/19 0853             PHYSICAL EXAMINATION:  Vitals:    01/15/19 1104   BP:    Pulse:    Resp:    Temp: 98.9 °F (37.2 °C)     Body mass index is 36.45 kg/m².    Physical Exam   Constitutional: Well developed, well nourished, NAD.  ENT: External ears no masses with nose patent; normal hearing.  Neck: Supple; trachea midline; no thyromegaly.  Cardiovascular: Normal heart sounds, no LE edema. DP +2 bilaterally.  Lungs: Normal effort; lungs anterior bilaterally clear to auscultation.  Abdomen: Soft, obese, no masses, no hernias.  MS: No clubbing or cyanosis of nails noted; unable to assess gait.  Skin: No rashes, lesions, or ulcers; no nodules. Surgical Incision is free of signs of infection. Mild lipo hypertropthy noted to right and left lower abdomen injection sites.  Psychiatric: Good judgement and insight; normal mood and affect.  Neurological: Cranial nerves are grossly intact.   Foot: nails in good condition, no amputations noted.        Labs Reviewed and Include   Recent Labs   Lab 01/15/19  0600   *   CALCIUM 8.3*   ALBUMIN 3.1*  3.1*   PROT 6.6      K 5.1   CO2 17*   *   BUN 57*   CREATININE 2.3*   ALKPHOS 142*   ALT 23   AST 36   BILITOT 0.2     Lab Results   Component  Value Date    WBC 28.34 (H) 01/15/2019    HGB 10.0 (L) 01/15/2019    HCT 32.6 (L) 01/15/2019    MCV 98 01/15/2019     01/15/2019     No results for input(s): TSH, FREET4 in the last 168 hours.  Lab Results   Component Value Date    HGBA1C 6.6 (H) 01/09/2019       Nutritional status:   Body mass index is 36.45 kg/m².  Lab Results   Component Value Date    ALBUMIN 3.1 (L) 01/15/2019    ALBUMIN 3.1 (L) 01/15/2019    ALBUMIN 3.3 (L) 01/14/2019     No results found for: PREALBUMIN    Estimated Creatinine Clearance: 32 mL/min (A) (based on SCr of 2.3 mg/dL (H)).    Accu-Checks  Recent Labs     01/14/19  1120 01/14/19  1825 01/14/19  2315 01/15/19  0839   POCTGLUCOSE 74 140* 185* 254*        ASSESSMENT and PLAN    * Status post living-donor kidney transplantation    Managed per primary team  Avoid hypoglycemia         Uncontrolled type 2 diabetes mellitus with stage 5 chronic kidney disease not on chronic dialysis, with long-term current use of insulin    BG goal 140-180    Start Levemir 20 units HS  Novolog 6 units TIDWM  Low correction scale.   BG monitoring AC/HS.     Discharge Recommendations:   TBD. Expect for patient to continue her home DM regimen.        BMI 36.0-36.9,adult    may contribute to insulin resistance  Body mass index is 36.45 kg/m².         Adverse effect of adrenal cortical steroids, sequela    On standard steroid taper per transplant team; may elevate BG readings         Prophylactic immunotherapy    On standard steroid taper per transplant team; may elevate BG readings         ESRD on dialysis    Managed per primary team  Avoid hypoglycemia             Plan discussed with patient, family at bedside.     Tre Sykes, FIDENCIO  Endocrinology  [facility]

## 2019-01-15 NOTE — NURSING TRANSFER
Nursing Transfer Note      1/14/2019     Transfer To: [id number] from PACU    Transfer via bed    Transfer with  O2    Transported by Transport x2 Respiratory    Medicines sent: IVF infusing, NS and D51/2NS and Dilaudid PCA    Chart send with patient: Yes    Notified: spouse    Patient reassessed at: 1/14/19 21:00

## 2019-01-15 NOTE — PROGRESS NOTES
Subjective:      Patient ID: Andra Newell is a 58 y.o. female.    Chief Complaint:Research      History of Present Illness  Ms. Andra Newell is a 58 y.o. female with a past medical history of HTN, DM type II, GERD, hyperlipidemia, hypothyroidism, and ESRD who is s/p a living-donor kidney transplant on 14JAN2019. The patient is CMV Donor+/Recipient-.      Patient reports she is feeling good, pain controlled with PCA. Has not yet been out of bed and ambulated. Denies fever, chills, n/v/d.     Ms. Newell was consented on 15JAN2019 for enrollment in [employer] CMV Prevention (Kidney Transplant) Protocol: [id number] IRB# [id number] PI: Nicholas GUAN). No study related procedures where conducted before informed consent was signed. Patient meets inclusion/exclusion criteria for study pending results of screening labs.     Review of Systems   Constitution: Negative for chills and fever.   HENT: Negative for congestion, hoarse voice and sore throat.    Eyes: Negative for blurred vision, redness and visual disturbance.   Cardiovascular: Negative for chest pain, leg swelling and palpitations.   Respiratory: Negative for cough, hemoptysis, shortness of breath and sputum production.    Hematologic/Lymphatic: Negative for adenopathy. Does not bruise/bleed easily.   Skin: Negative for dry skin, itching, rash and suspicious lesions.   Musculoskeletal: Negative for back pain, joint pain, myalgias and neck pain.   Gastrointestinal: Positive for heartburn. Negative for abdominal pain, diarrhea, nausea and vomiting.   Genitourinary: Negative for flank pain, hematuria and hesitancy.   Neurological: Negative for dizziness, headaches, numbness and paresthesias.   Psychiatric/Behavioral: Negative for depression and memory loss. The patient does not have insomnia and is not nervous/anxious.      Objective:   Physical Exam   Constitutional: She is oriented to person, place, and time. She appears well-developed and  well-nourished.   HENT:   Head: Normocephalic and atraumatic.   Right Ear: External ear normal.   Left Ear: External ear normal.   Nose: Nose normal.   Eyes: Conjunctivae and EOM are normal. Right eye exhibits no discharge. Left eye exhibits no discharge.   Neck: Trachea normal and normal range of motion. Neck supple. No JVD present.   Cardiovascular: Normal rate and normal heart sounds.   Pulmonary/Chest: Effort normal and breath sounds normal. No respiratory distress. She has no wheezes. She has no rales.   Abdominal: Soft. She exhibits no distension. Bowel sounds are decreased. There is no guarding.   Genitourinary: No vaginal discharge found.   Genitourinary Comments: Grace in place draining clear/yellow urine   Musculoskeletal: Normal range of motion. She exhibits no edema.   Neurological: She is alert and oriented to person, place, and time.   Skin: Skin is warm and dry. Capillary refill takes less than 2 seconds.   Incision to RLQ with staples, dressing intact. Right IJ central line with dressing in place.    Psychiatric: She has a normal mood and affect. Her behavior is normal. Judgment and thought content normal. Cognition and memory are normal.   Vitals reviewed.    Assessment:       1. Exam for clinical research    2. Status post living-donor kidney transplantation          Plan:       Plan:  1. Screening visit today  2. Study labs drawn  3. Patient to follow up with ID research for randomization and drug administration once screening labs resulted. Will contact patient with date/time.

## 2019-01-15 NOTE — PROGRESS NOTES
Admit Note     Met with patient and significant other Any to assess needs. Patient is a 58 y.o. is in committed relationship with Any  female, admitted for for kidney transplant via living donor from friend.     Patient admitted from home on 1/14/2019 .  At this time, patient presents as alert and oriented x 4, pleasant, good eye contact, well groomed, recall good, concentration/judgement good, average intelligence, calm, communicative, cooperative and asking and answering questions appropriately.  At this time, patients caregiver presents as alert and oriented x 4, pleasant, good eye contact, well groomed, recall good, concentration/judgement good, average intelligence, calm, communicative, cooperative and asking and answering questions appropriately.    Household/Family Systems     Patient resides with patient's significant other, Any at [address] [place] [address].  Support system includes s/o, two sons and friend Ashleigh.  Patient does not have dependents that are need of being cared for.     Patients primary caregiver is Any , patients significant other, phone number [phone].  Confirmed patients contact information is [phone] (home);   Telephone Information:   Mobile [phone]   .    During admission, patient's caregiver plans to stay in patient's room.  Confirmed patient and patients caregivers do have access to reliable transportation.    Cognitive Status/Learning     Patient reports reading ability as 12th grade and states patient does have difficulty with seeing and utilizes glasses .  Patient reports patient learns best by hands-on instruction .   Needed: No.   Highest education level: High School (9-12) or GED    Vocation/Disability   .  Working for Income: No  If no, reason not working: Disability  Patient is disabled due to work injury  since 2015.  Prior to disability, patient  was employed as a Rec Supervisor at [employer] .    Adherence     Patient  reports a high level of adherence to patients health care regimen.  Adherence counseling and education provided. Patient verbalizes understanding.    Substance Use    Patient reports the following substance usage.    Tobacco: none, patient denies any use.  Alcohol: none, patient denies any use.  Illicit Drugs/Non-prescribed Medications: none, patient denies any use.  Patient states clear understanding of the potential impact of substance use.  Substance abstinence/cessation counseling, education and resources provided and reviewed.     Services Utilizing/ADLS    Infusion Service: Prior to admission, patient utilizing? no  Home Health: Prior to admission, patient utilizing? no  DME: Prior to admission, yes scooter for long distances  Pulmonary/Cardiac Rehab: Prior to admission, no  Dialysis:  Prior to admission, yes MWF  Transplant Specialty Pharmacy:  Prior to admission, yes; [facility] .    Prior to admission, patient reports patient was independent with ADLS and was driving.  Patient reports patient is not able to care for self at this time due to compromised medical condition (as documented in medical record) and physical weakness..  Patient indicates a willingness to care for self once medically cleared to do so.    Insurance/Medications    Insured by   Payor/Plan Subscr  Sex Relation Sub. Ins. ID Effective Group Num   1. [employer] * KVNG NUNEZ 1902 Female  [id number]                                    [address] [facility] [id number]   2. [place]* VIRGIE DUKE * 1960 Female  [id number]                                    [address]      Primary Insurance (for UNOS reporting): Public Insurance - Medicare FFS (Fee For Service)  Secondary Insurance (for UNOS reporting): Private Insurance    Patient reports patient is able to obtain and afford medications at this time and at time of discharge.    Living Will/Healthcare Power of     Patient  states patient does not have a LW and/or HCPA.   provided education regarding LW and HCPA and the completion of forms.    Coping/Mental Health    Patient is coping adequately with the aid of  family members and friends.  Patient denies mental health difficulties at this present time. Pt does have a history of SI after work injury in 2015 and was hospitalized. Pt is currently followed by Dr. Sheth for depression. Pt utilizes Lorazepam and Vilbryd daily and reports as stable.     Discharge Planning    At time of discharge, patient plans check into [facility] [place] apartments (if available) under the care of Any.  Patients significant other will transport patient.  Per rounds today, expected discharge date has not been medically determined at this time. Patient and patients caregiver  verbalize understanding and are involved in treatment planning and discharge process.    Additional Concerns    Patient's caretaker denies additional needs and/or concerns at this time.  providing ongoing psychosocial support, education, resources and d/c planning as needed.  SW remains available. Patient's caregiver verbalizes understanding and agreement with information reviewed,  availability and how to access available resources as needed. Patient denies additional needs and/or concerns at this time. Patient verbalizes understanding and agreement with information reviewed, social work availability, and how to access available resources as needed.

## 2019-01-15 NOTE — ANESTHESIA POSTPROCEDURE EVALUATION
"Anesthesia Post Evaluation    Patient: Andra Newell    Procedure(s) Performed: Procedure(s) (LRB):  TRANSPLANT, KIDNEY (N/A)    Final Anesthesia Type: general  Patient location during evaluation: PACU  Patient participation: Yes- Able to Participate  Level of consciousness: awake and alert  Post-procedure vital signs: reviewed and stable  Pain management: adequate  Airway patency: patent  PONV status at discharge: No PONV  Anesthetic complications: no      Cardiovascular status: blood pressure returned to baseline and stable  Respiratory status: unassisted  Hydration status: euvolemic  Follow-up not needed.        Visit Vitals  /60   Pulse 105   Temp 37.1 °C (98.7 °F) (Oral)   Resp 13   Ht 5' 6.5" (1.689 m)   Wt 104 kg (229 lb 4.5 oz)   LMP 02/28/2012 (Exact Date)   SpO2 (!) 94%   Breastfeeding? No   BMI 36.45 kg/m²       Pain/Asmita Score: Pain Rating Prior to Med Admin: 0 (1/14/2019  6:10 PM)  Asmita Score: 9 (1/14/2019  7:14 PM)        "

## 2019-01-15 NOTE — TRANSFER OF CARE
"Anesthesia Transfer of Care Note    Patient: Andra Newell    Procedure(s) Performed: Procedure(s) (LRB):  TRANSPLANT, KIDNEY (N/A)    Patient location: PACU    Anesthesia Type: general    Transport from OR: Transported from OR on 100% O2 by closed face mask with adequate spontaneous ventilation    Post pain: adequate analgesia    Post assessment: no apparent anesthetic complications    Post vital signs: stable    Level of consciousness: awake    Nausea/Vomiting: no nausea/vomiting    Complications: none    Transfer of care protocol was followed      Last vitals:   Visit Vitals  /64   Pulse 98   Temp 36.4 °C (97.5 °F) (Temporal)   Resp 18   Ht 5' 6.5" (1.689 m)   Wt 100.4 kg (221 lb 5.5 oz)   LMP 02/28/2012 (Exact Date)   SpO2 100%   Breastfeeding? No   BMI 35.19 kg/m²     "

## 2019-01-15 NOTE — SUBJECTIVE & OBJECTIVE
Subjective:     History of Present Illness:   38 y/o F with ESRD 2/2 DM II recently off HD x 4 months, but on HD x 4 months prior to that. She is now s/p LURKT 1/14/19 (Campath ind, CMV D+, R-).       Ms. Newell is a 58 y.o. year old female with ESRD secondary to diabetic nephropathy.  She received a living unrelated kidney transplant on 1/14/19.  She was off HD x 4 mo at the time of transplantation.  She received induction therapy with Campath and her maintenance immunosuppression consists of:   Immunosuppressants (From admission, onward)    Start     Stop Route Frequency Ordered    01/15/19 1800  tacrolimus capsule 1 mg  (TACROLIMUS AND MYCOPHENOLATE PANEL)      -- Oral 2 times daily 01/15/19 1323    01/14/19 1738  mycophenolate capsule 500 mg  (TACROLIMUS AND MYCOPHENOLATE PANEL)      -- Oral 2 times daily 01/14/19 1738          Previous Transplant: no    Past Medical and Surgical History: Ms. Newell has a past medical history of Anemia, Anemia in chronic kidney disease, on chronic dialysis (7/12/2017), Arthritis, Asthma, Colon polyp, Depression, Diabetes mellitus, Diverticulosis, Eosinophilia, ESRD (end stage renal disease), ESRD on dialysis, GERD (gastroesophageal reflux disease), Gout, Gout, Hyperlipidemia, Hypertension, Low back pain, MRSA carrier, Obesity, Renal disorder, Rotator cuff syndrome--left, Thyroid disease, Ulnar neuropathy of right upper extremity, and Uncontrolled type 2 diabetes mellitus with hyperglycemia.  She has a past surgical history that includes Back surgery; Shoulder arthroscopy; Cholecystectomy; Knee surgery; Joint replacement; Hemorrhoid surgery; Achilles tendon surgery (Left, May 2015); Shoulder surgery; Dialysis fistula creation (12/2017); Colonoscopy (N/A, 9/6/2017); Upper gastrointestinal endoscopy (~2013); and Kidney transplant (N/A, 1/14/2019).    Past Social and Family History: Ms. Newell reports that  has never smoked. she has never used smokeless tobacco. She  reports that she does not drink alcohol or use drugs.Her family history includes Alzheimer's disease in her mother; COPD in her maternal aunt; Diabetes in her maternal aunt, maternal grandmother, mother, and sister; Heart disease in her brother, father, and maternal aunt; Hyperlipidemia in her mother; Hypertension in her maternal aunt and maternal grandmother; Lupus in her sister; No Known Problems in her maternal uncle, paternal aunt, paternal grandfather, paternal grandmother, paternal uncle, and son; Ovarian cancer in her sister; Stroke in her father; Thyroid disease in her mother.    Intake/Output - Last 3 Shifts       01/13 0700 - 01/14 0659 01/14 0700 - 01/15 0659 01/15 0700 - 01/16 0659    P.O.  570 540    I.V. (mL/kg)  5367.3 (51.6) 835.8 (8)    Total Intake(mL/kg)  5937.3 (57.1) 1375.8 (13.2)    Urine (mL/kg/hr)  4610 1050 (1.5)    Emesis/NG output  0     Total Output  4610 1050    Net  +1327.3 +325.8           Emesis Occurrence  1 x            Review of Systems   Constitutional: Negative for chills and fever.   HENT: Negative for congestion, sore throat and trouble swallowing.    Respiratory: Negative for cough, shortness of breath and wheezing.    Cardiovascular: Negative for chest pain, palpitations and leg swelling.   Gastrointestinal: Positive for abdominal pain. Negative for abdominal distention, nausea and vomiting.        + acid reflux   Genitourinary: Negative for decreased urine volume, dysuria and hematuria.   Musculoskeletal: Negative for neck pain and neck stiffness.   Skin: Positive for wound. Negative for rash.   Allergic/Immunologic: Positive for immunocompromised state.   Neurological: Negative for tremors, syncope and light-headedness.   Psychiatric/Behavioral: Negative for agitation, behavioral problems and confusion.     Objective:     Vital Signs (Most Recent):  Temp: 98.9 °F (37.2 °C) (01/15/19 1104)  Pulse: 99 (01/15/19 1225)  Resp: 15 (01/15/19 1225)  BP: (!) 142/61 (01/15/19  "1225)  SpO2: 95 % (01/15/19 1225) Vital Signs (24h Range):  Temp:  [97.5 °F (36.4 °C)-98.9 °F (37.2 °C)] 98.9 °F (37.2 °C)  Pulse:  [0-110] 99  Resp:  [10-20] 15  SpO2:  [92 %-100 %] 95 %  BP: (112-153)/(53-76) 142/61  Arterial Line BP: (108-148)/(47-62) 128/62     Weight: 104 kg (229 lb 4.5 oz)  Height: 5' 6.5" (168.9 cm)  Body mass index is 36.45 kg/m².    Physical Exam   Constitutional: She is oriented to person, place, and time. She appears well-developed and well-nourished.   HENT:   Head: Normocephalic and atraumatic.   Eyes: EOM are normal. No scleral icterus.   Neck: Normal range of motion. Neck supple. No JVD present.   Cardiovascular: Normal rate, regular rhythm and normal heart sounds.   Pulmonary/Chest: Effort normal and breath sounds normal. No respiratory distress. She has no wheezes. She has no rales.   Abdominal: Soft. Bowel sounds are normal. She exhibits distension. There is tenderness. There is no guarding.   RLQ incision intact with staples - minimal serosanguinous drainage     Musculoskeletal: She exhibits no edema.   Neurological: She is alert and oriented to person, place, and time.   Skin: Skin is warm and dry.   Pruritus    Psychiatric: She has a normal mood and affect. Her behavior is normal. Thought content normal.   Nursing note and vitals reviewed.      Significant Labs:  CBC:   Recent Labs   Lab 01/14/19  0904 01/14/19  1827 01/15/19  0600   WBC 10.49  --  28.34*   RBC 3.87*  --  3.32*   HGB 11.4*  --  10.0*   HCT 36.9* 35.4*  35.4* 32.6*     --  173   MCV 95  --  98   MCH 29.5  --  30.1   MCHC 30.9*  --  30.7*     CMP:   Recent Labs   Lab 01/14/19  0904 01/14/19  1827 01/15/19  0600   GLU 88  88 151* 292*   CALCIUM 9.9  9.9 8.0* 8.3*   ALBUMIN 3.8  3.8 3.3* 3.1*  3.1*   PROT 8.0  --  6.6     144 142 142   K 5.6*  5.6* 5.0 5.1   CO2 20*  20* 13* 17*   *  116* 120* 117*   BUN 82*  82* 73* 57*   CREATININE 3.7*  3.7* 2.9* 2.3*   ALKPHOS 192*  --  142*   ALT " 22  --  23   AST 17  --  36     Coagulation:   Recent Labs   Lab 01/14/19  0904   APTT 25.2       Diagnostics:  None

## 2019-01-15 NOTE — PLAN OF CARE
Problem: Adult Inpatient Plan of Care  Goal: Plan of Care Review  Outcome: Ongoing (interventions implemented as appropriate)  Pt AAOx4, VSS, afebrile.  Pain controlled with PCA diluadid pump.  Fall risk precautions initiated.  Pt in lowest bed position setting, lighting adjusted, pt to wear nonskid socks when ambulating, side rails up x2.  Pt remain free from falls during shift.  Pt verbalize understanding to call when needed assistance. Call light within reach.  Will continue to monitor.

## 2019-01-15 NOTE — CONSULTS
[facility]  Kidney Transplant  Consult Note    IP Consult to Kidney/Pancreas Transplant Medicine  Consult performed by: Lidia Elizabeth PA-C  Consult ordered by: Roland Salazar MD            Subjective:     History of Present Illness:   38 y/o F with ESRD 2/2 DM II recently off HD x 4 months, but on HD x 4 months prior to that. She is now s/p LURKT 1/14/19 (Campath ind, CMV D+, R-).     Interval History:   No acute events overnight.  Cr 2.3 from 3.7. UOP 4.7L past 24h. Pain well controlled. (-) flatulence/(-) BM. Consulted PT/OT as pt uses scooter at home 2/2 multiple knee replacements and achilles tendon injury. Also consulted endocrine for insulin needs. Appreciate recs. This afternoon, pt c/o indigestion with eating. Gave maalox + switched from Pepcid to Protonix as pt took 40 mg daily at home. Will deescalate diet to clears for dinner and cont to monitor. Pt also c/o pruritus, possibly related to pain meds. Will give benadryl and monitor. Start Ergo weekly for Vit D 12. Start Bicarb 1300 mg BID for metabolic acidosis. Continue to monitor.    Ms. Newell is a 58 y.o. year old female with ESRD secondary to diabetic nephropathy.  She received a living unrelated kidney transplant on 1/14/19.  She was off HD x 4 mo at the time of transplantation.  She received induction therapy with Campath and her maintenance immunosuppression consists of:   Immunosuppressants (From admission, onward)    Start     Stop Route Frequency Ordered    01/15/19 1800  tacrolimus capsule 1 mg  (TACROLIMUS AND MYCOPHENOLATE PANEL)      -- Oral 2 times daily 01/15/19 1323    01/14/19 1738  mycophenolate capsule 500 mg  (TACROLIMUS AND MYCOPHENOLATE PANEL)      -- Oral 2 times daily 01/14/19 1738          Previous Transplant: no    Past Medical and Surgical History: Ms. Newell has a past medical history of Anemia, Anemia in chronic kidney disease, on chronic dialysis (7/12/2017), Arthritis, Asthma, Colon polyp,  Depression, Diabetes mellitus, Diverticulosis, Eosinophilia, ESRD (end stage renal disease), ESRD on dialysis, GERD (gastroesophageal reflux disease), Gout, Gout, Hyperlipidemia, Hypertension, Low back pain, MRSA carrier, Obesity, Renal disorder, Rotator cuff syndrome--left, Thyroid disease, Ulnar neuropathy of right upper extremity, and Uncontrolled type 2 diabetes mellitus with hyperglycemia.  She has a past surgical history that includes Back surgery; Shoulder arthroscopy; Cholecystectomy; Knee surgery; Joint replacement; Hemorrhoid surgery; Achilles tendon surgery (Left, May 2015); Shoulder surgery; Dialysis fistula creation (12/2017); Colonoscopy (N/A, 9/6/2017); Upper gastrointestinal endoscopy (~2013); and Kidney transplant (N/A, 1/14/2019).    Past Social and Family History: Ms. Newell reports that  has never smoked. she has never used smokeless tobacco. She reports that she does not drink alcohol or use drugs.Her family history includes Alzheimer's disease in her mother; COPD in her maternal aunt; Diabetes in her maternal aunt, maternal grandmother, mother, and sister; Heart disease in her brother, father, and maternal aunt; Hyperlipidemia in her mother; Hypertension in her maternal aunt and maternal grandmother; Lupus in her sister; No Known Problems in her maternal uncle, paternal aunt, paternal grandfather, paternal grandmother, paternal uncle, and son; Ovarian cancer in her sister; Stroke in her father; Thyroid disease in her mother.    Intake/Output - Last 3 Shifts       01/13 0700 - 01/14 0659 01/14 0700 - 01/15 0659 01/15 0700 - 01/16 0659    P.O.  570 540    I.V. (mL/kg)  5367.3 (51.6) 835.8 (8)    Total Intake(mL/kg)  5937.3 (57.1) 1375.8 (13.2)    Urine (mL/kg/hr)  4610 1050 (1.5)    Emesis/NG output  0     Total Output  4610 1050    Net  +1327.3 +325.8           Emesis Occurrence  1 x            Review of Systems   Constitutional: Negative for chills and fever.   HENT: Negative for congestion,  "sore throat and trouble swallowing.    Respiratory: Negative for cough, shortness of breath and wheezing.    Cardiovascular: Negative for chest pain, palpitations and leg swelling.   Gastrointestinal: Positive for abdominal pain. Negative for abdominal distention, nausea and vomiting.        + acid reflux   Genitourinary: Negative for decreased urine volume, dysuria and hematuria.   Musculoskeletal: Negative for neck pain and neck stiffness.   Skin: Positive for wound. Negative for rash.   Allergic/Immunologic: Positive for immunocompromised state.   Neurological: Negative for tremors, syncope and light-headedness.   Psychiatric/Behavioral: Negative for agitation, behavioral problems and confusion.     Objective:     Vital Signs (Most Recent):  Temp: 98.9 °F (37.2 °C) (01/15/19 1104)  Pulse: 99 (01/15/19 1225)  Resp: 15 (01/15/19 1225)  BP: (!) 142/61 (01/15/19 1225)  SpO2: 95 % (01/15/19 1225) Vital Signs (24h Range):  Temp:  [97.5 °F (36.4 °C)-98.9 °F (37.2 °C)] 98.9 °F (37.2 °C)  Pulse:  [0-110] 99  Resp:  [10-20] 15  SpO2:  [92 %-100 %] 95 %  BP: (112-153)/(53-76) 142/61  Arterial Line BP: (108-148)/(47-62) 128/62     Weight: 104 kg (229 lb 4.5 oz)  Height: 5' 6.5" (168.9 cm)  Body mass index is 36.45 kg/m².    Physical Exam   Constitutional: She is oriented to person, place, and time. She appears well-developed and well-nourished.   HENT:   Head: Normocephalic and atraumatic.   Eyes: EOM are normal. No scleral icterus.   Neck: Normal range of motion. Neck supple. No JVD present.   Cardiovascular: Normal rate, regular rhythm and normal heart sounds.   Pulmonary/Chest: Effort normal and breath sounds normal. No respiratory distress. She has no wheezes. She has no rales.   Abdominal: Soft. Bowel sounds are normal. She exhibits distension. There is tenderness. There is no guarding.   RLQ incision intact with staples - minimal serosanguinous drainage     Musculoskeletal: She exhibits no edema.   Neurological: She is " alert and oriented to person, place, and time.   Skin: Skin is warm and dry.   Pruritus    Psychiatric: She has a normal mood and affect. Her behavior is normal. Thought content normal.   Nursing note and vitals reviewed.      Significant Labs:  CBC:   Recent Labs   Lab 01/14/19  0904 01/14/19  1827 01/15/19  0600   WBC 10.49  --  28.34*   RBC 3.87*  --  3.32*   HGB 11.4*  --  10.0*   HCT 36.9* 35.4*  35.4* 32.6*     --  173   MCV 95  --  98   MCH 29.5  --  30.1   MCHC 30.9*  --  30.7*     CMP:   Recent Labs   Lab 01/14/19  0904 01/14/19  1827 01/15/19  0600   GLU 88  88 151* 292*   CALCIUM 9.9  9.9 8.0* 8.3*   ALBUMIN 3.8  3.8 3.3* 3.1*  3.1*   PROT 8.0  --  6.6     144 142 142   K 5.6*  5.6* 5.0 5.1   CO2 20*  20* 13* 17*   *  116* 120* 117*   BUN 82*  82* 73* 57*   CREATININE 3.7*  3.7* 2.9* 2.3*   ALKPHOS 192*  --  142*   ALT 22  --  23   AST 17  --  36     Coagulation:   Recent Labs   Lab 01/14/19  0904   APTT 25.2       Diagnostics:  None    Assessment/Plan:     * Status post living-donor kidney transplantation    - s/p LURKT 1/14/16 2/2 DMII (Campath ind, CMV D+, R-, WIT 58 min)  - Cr decreased from 3.7 to 2.3.  - UOP 4.7L past 24h.  - 3 day bull, per op note  - Not yet tolerating PO intake 2/2 indigestion. Maalox + switched pepcid to protonix (pt was on protonix at home)  - Will try clears for dinner and cont to monitor.  - Pain well controlled   - (-) flatulence. (-) BM.  - PT/OT consulted to help mobilize as pt uses scooter at home 2/2 multiple knee replacements and achilles tendon injury.  - Cont to monitor.     Prophylactic immunotherapy    - Continue Prograf, Cellcept, steroids per Campath induction protocol.  - Monitor Prograf trough level daily and adjust dose as needed to achieve therapeutic level.  - Monitor.     At risk for opportunistic infections    - Continue Prograf and Cellcept.  - ID saw pt re: Valcyte drug trial.  - Monitor for adverse drug effects. Check  "Prograf trough daily and adjust dose as needed to reach therapeutic level.     Metabolic acidosis    - Start Bicarb 1300 mg BID  - Monitor w/ daily labs       Vitamin D deficiency    - Vit D 12 1/14  - Start Ergo weekly       Long-term use of immunosuppressant medication    - See "prophylactic immunotherapy."       Adverse effect of adrenal cortical steroids, sequela    - Monitor.       BMI 36.0-36.9,adult    - Monitor.       Depression    - Patient takes VIIbryd at home, not on formulary here.  - Allowing patient to take home med (with pharm clearance) as she experiences withdrawal s/e  - Cont to monitor.       Uncontrolled type 2 diabetes mellitus with stage 5 chronic kidney disease not on chronic dialysis, with long-term current use of insulin    - Endocrine consulted, appreciate recs.  - Monitor.       ESRD on dialysis-resolved as of 1/15/2019               Discharge Planning:  Not stable for discharge at this time.      Lidia Elizabeth PA-C  Kidney Transplant  [facility]  "

## 2019-01-15 NOTE — ASSESSMENT & PLAN NOTE
- Patient takes VIIbryd at home, not on formulary here.  - Allowing patient to take home med (with pharm clearance) as she experiences withdrawal s/e  - Cont to monitor.

## 2019-01-15 NOTE — ASSESSMENT & PLAN NOTE
- Continue Prograf, Cellcept, steroids per Campath induction protocol.  - Monitor Prograf trough level daily and adjust dose as needed to achieve therapeutic level.  - Monitor.

## 2019-01-15 NOTE — PROGRESS NOTES
Patient S/O at bedside, Patient awakens Ox3, VSS w CPAP, used PCA for pain, updated patient and family on plan of care and room assignment.Gisella

## 2019-01-15 NOTE — HPI
Andra Newell is a 40 y/o F with ESRD 2/2 DM II started on HD 4/13/18 on Monday/Friday schedule, stopped after 4 months (September 2018) due to stable renal function. Patient essentially pre-dialysis. She is now s/p LURT 1/14/19 (Campath induction, CMV D+/R-, TIT 58 min). Of note, pre-txp, patient used scooter at home and for long distances due to multiple knee replacements and achilles tendon injury. Surgery uncomplicated.

## 2019-01-15 NOTE — SUBJECTIVE & OBJECTIVE
Interval HPI:   Overnight events:      Is now in TSU. NAEON. Diet has advanced.  BG is above goal at time of consult.     Eatin%  Nausea: No  Hypoglycemia and intervention: No  Fever: No  TPN and/or TF: No  If yes, type of TF/TPN and rate: None    PMH, PSH, FH, SH reviewed     Review of Systems   Constitutional: Positive for weight changes. Loss of 20lbs prior to surgery.  Eyes: Negative for visual disturbance.  Respiratory: Negative for cough.   Cardiovascular: Positive for chest pain. Complains of GERD symptoms.  Gastrointestinal: Positive for nausea.  Endocrine: Positive for polydipsia.  Musculoskeletal: Positive for chronic back pain.  Skin: Negative for rash.  Neurological: Negative for syncope.  Psychiatric/Behavioral: Negative for depression.      Current Medications and/or Treatments Impacting Glycemic Control  Immunotherapy:    Immunosuppressants         Stop Route Frequency     mycophenolate capsule 500 mg      -- Oral 2 times daily     tacrolimus capsule 2 mg      -- Oral 2 times daily        Steroids:   Hormones (From admission, onward)    None        Pressors:    Autonomic Drugs (From admission, onward)    Start     Stop Route Frequency Ordered    19 1229  cisatracurium (NIMBEX) 2 mg/mL injection     Comments:  Created by cabinet override    01/15 0044   19 1229        Hyperglycemia/Diabetes Medications:   Antihyperglycemics (From admission, onward)    Start     Stop Route Frequency Ordered    01/15/19 0952  insulin aspart U-100 pen 0-5 Units      -- SubQ Before meals & nightly PRN 01/15/19 0853             PHYSICAL EXAMINATION:  Vitals:    01/15/19 1104   BP:    Pulse:    Resp:    Temp: 98.9 °F (37.2 °C)     Body mass index is 36.45 kg/m².    Physical Exam   Constitutional: Well developed, well nourished, NAD.  ENT: External ears no masses with nose patent; normal hearing.  Neck: Supple; trachea midline; no thyromegaly.  Cardiovascular: Normal heart sounds, no LE edema. DP +2  bilaterally.  Lungs: Normal effort; lungs anterior bilaterally clear to auscultation.  Abdomen: Soft, obese, no masses, no hernias.  MS: No clubbing or cyanosis of nails noted; unable to assess gait.  Skin: No rashes, lesions, or ulcers; no nodules. Surgical Incision is free of signs of infection. Mild lipo hypertropthy noted to right and left lower abdomen injection sites.  Psychiatric: Good judgement and insight; normal mood and affect.  Neurological: Cranial nerves are grossly intact.   Foot: nails in good condition, no amputations noted.

## 2019-01-15 NOTE — ASSESSMENT & PLAN NOTE
- s/p LURKT 1/14/16 2/2 DMII (Campath ind, CMV D+, R-, WIT 58 min)  - Cr decreased from 3.7 to 2.3.  - UOP 4.7L past 24h.  - 3 day ml, per op note  - Not yet tolerating PO intake 2/2 indigestion. Maalox + switched pepcid to protonix (pt was on protonix at home)  - Will try clears for dinner and cont to monitor.  - Pain well controlled   - (-) flatulence. (-) BM.  - PT/OT consulted to help mobilize as pt uses scooter at home 2/2 multiple knee replacements and achilles tendon injury.  - Cont to monitor.

## 2019-01-15 NOTE — ASSESSMENT & PLAN NOTE
- Continue Prograf and Cellcept.  - ID saw pt re: Valcyte drug trial.  - Monitor for adverse drug effects. Check Prograf trough daily and adjust dose as needed to reach therapeutic level.

## 2019-01-15 NOTE — CONSULTS
" [facility]  Adult Nutrition  Consult Note     SUMMARY       Recommendations  Recommendation/Intervention:   1. Continue diet order as tolerated   2. Encourage good po intake as tolerated   3. Dietitian to provide post-transplant diet education when appropriate   4. Dietitian Following    Goals: Patient to meet >85% of EEN/EPN  Nutrition Goal Status: new  Communication of RD Recs: (POC)    General Information Comments: Pt s/p OKTx 1/14/19, ESRD 2/2 Diabetes Mellitus - Type II. Diet advanced to Diabetic/Low K. Reports decreased po intake related to indigestion. TSU RD to provide post-transplant diet education when appropriate. Patient appears nourished. Good po intake PTA. Malnutrition not indicated at this time.  Nutrition Discharge Planning: TSU RD to provide post-transplant diet education when appropriate.     Reason for Assessment  Reason For Assessment: consult  Diagnosis: transplant/postoperative complications(OKTx 1/14/19)  Relevant Medical History: ESRD 2/2 Diabetes Mellitus - Type II  Interdisciplinary Rounds: did not attend  General Information Comments: Pt s/p OKTx 1/14/19, ESRD 2/2 Diabetes Mellitus - Type II. Diet advanced to Regular, decreased po intake. TSU RD to provide post-transplant diet education when appropriate. Patient appears nourished. Dietitian does not feel patient meets the criteria for malnutrition at this time.   Nutrition Discharge Planning: TSU RD to provide post-transplant diet education when appropriate.     Nutrition Risk Screen  Nutrition Risk Screen: no indicators present    Nutrition/Diet History  Patient Reported Diet/Restrictions/Preferences: diabetic diet, general  Spiritual, Cultural Beliefs, Methodist Practices, Values that Affect Care: no  Food Allergies: NKFA  Factors Affecting Nutritional Intake: abdominal distension, decreased appetite    Anthropometrics  Temp: 98.7 °F (37.1 °C)  Height Method: Stated  Height: 5' 6.5" (168.9 cm)  Height (inches): 66.5 " in  Weight Method: Bed Scale  Weight: 104 kg (229 lb 4.5 oz)  Weight (lb): 229.28 lb  Ideal Body Weight (IBW), Female: 132.5 lb  % Ideal Body Weight, Female (lb): 167.05 lb  BMI (Calculated): 35.3  BMI Grade: 35 - 39.9 - obesity - grade II     Lab/Procedures/Meds  Labs: Reviewed    01/15/2019    K 5.1 01/15/2019     (H) 01/15/2019    CO2 17 (L) 01/15/2019    BUN 57 (H) 01/15/2019    CREATININE 2.3 (H) 01/15/2019    CALCIUM 8.3 (L) 01/15/2019    PHOS 4.8 (H) 01/15/2019    MG 1.9 01/15/2019    EGFRNONAA 22.4 (A) 01/15/2019    ALBUMIN 3.1 (L) 01/15/2019     POCT Glucose   Date Value Ref Range Status   01/15/2019 254 (H) 70 - 110 mg/dL Final   01/14/2019 185 (H) 70 - 110 mg/dL Final     Lab Results   Component Value Date    HGBA1C 6.6 (H) 01/09/2019     Meds: Reviewed  Scheduled Meds:   bisacodyl  10 mg Oral QHS    docusate sodium  100 mg Oral TID    ergocalciferol  50,000 Units Oral Q7 Days    famotidine  20 mg Oral QHS    heparin (porcine)  5,000 Units Subcutaneous Q8H    levothyroxine  75 mcg Oral Before breakfast    multivitamin  1 tablet Oral Daily    mupirocin  1 g Nasal BID    mycophenolate  500 mg Oral BID    nystatin  500,000 Units Mouth/Throat QID (PC + HS)    sodium bicarbonate  1,300 mg Oral BID    sulfamethoxazole-trimethoprim 400-80mg  1 tablet Oral Daily    tacrolimus  2 mg Oral BID    vilazodone  40 mg Oral Daily     Continuous Infusions:   sodium chloride 0.9% 125 mL/hr at 01/15/19 1024    dextrose 5 % and 0.45 % NaCl 50 mL/hr at 01/14/19 1825    glucagon (human recombinant)      hydromorphone in 0.9 % NaCl 6 mg/30 ml       Estimated/Assessed Needs  Weight Used For Calorie Calculations: 104 kg (229 lb 4.5 oz)  Energy Calorie Requirements (kcal): 2042(PAL 1.25)  Energy Need Method: [place](PAL 1.25)  Protein Requirements: 104(1.0 gm/kg)  Weight Used For Protein Calculations: 104 kg (229 lb 4.5 oz)  Fluid Requirements (mL): 1mL/kcal or per MD  Estimated Fluid  Requirement Method: RDA Method(1mL/kcal or per MD)  RDA Method (mL): 2042     Nutrition Prescription Ordered  Current Diet Order: Diabetic, Low K    Evaluation of Received Nutrient/Fluid Intake in last 24h  I/O: +1.3L since admit  Comments: LBM 1/14/19  % Intake of Estimated Energy Needs: 0 - 25 %  % Meal Intake: 0 - 25 %    Nutrition Risk  Level of Risk/Frequency of Follow-up: high(2x week)     Assessment and Plan  Nutrition Problem  Increased nutrient needs     Related to (etiology):   Physiological causes related to healing     Signs and Symptoms (as evidenced by):   S/p OKTx 1/14/19    Interventions/Recommendations (treatment strategy):  Increased calorie and protein diet     Nutrition Diagnosis Status:   New    Monitor and Evaluation  Food and Nutrient Intake: energy intake, food and beverage intake  Food and Nutrient Adminstration: diet order  Anthropometric Measurements: weight change, weight  Biochemical Data, Medical Tests and Procedures: lipid profile, inflammatory profile, glucose/endocrine profile, gastrointestinal profile, electrolyte and renal panel  Nutrition-Focused Physical Findings: overall appearance     Nutrition Follow-Up  RD Follow-up?: Yes

## 2019-01-15 NOTE — ASSESSMENT & PLAN NOTE
BG goal 140-180    Start Levemir 20 units HS  Moderate correction scale.   BG monitoring AC/HS.     Discharge Recommendations:   TBD. Expect for patient to continue her home DM regimen.

## 2019-01-16 LAB
ALBUMIN SERPL BCP-MCNC: 3 G/DL
ANION GAP SERPL CALC-SCNC: 6 MMOL/L
BASOPHILS # BLD AUTO: 0.01 K/UL
BASOPHILS NFR BLD: 0.1 %
BUN SERPL-MCNC: 30 MG/DL
CALCIUM SERPL-MCNC: 8.7 MG/DL
CHLORIDE SERPL-SCNC: 113 MMOL/L
CO2 SERPL-SCNC: 20 MMOL/L
CREAT SERPL-MCNC: 1 MG/DL
DIFFERENTIAL METHOD: ABNORMAL
EOSINOPHIL # BLD AUTO: 0 K/UL
EOSINOPHIL NFR BLD: 0 %
ERYTHROCYTE [DISTWIDTH] IN BLOOD BY AUTOMATED COUNT: 12.8 %
EST. GFR  (AFRICAN AMERICAN): >60 ML/MIN/1.73 M^2
EST. GFR  (NON AFRICAN AMERICAN): >60 ML/MIN/1.73 M^2
GLUCOSE SERPL-MCNC: 218 MG/DL
HCT VFR BLD AUTO: 29.7 %
HGB BLD-MCNC: 9.3 G/DL
IMM GRANULOCYTES # BLD AUTO: 0.14 K/UL
IMM GRANULOCYTES NFR BLD AUTO: 0.7 %
LYMPHOCYTES # BLD AUTO: 0 K/UL
LYMPHOCYTES NFR BLD: 0.1 %
MAGNESIUM SERPL-MCNC: 2.3 MG/DL
MCH RBC QN AUTO: 30 PG
MCHC RBC AUTO-ENTMCNC: 31.3 G/DL
MCV RBC AUTO: 96 FL
MONOCYTES # BLD AUTO: 0.1 K/UL
MONOCYTES NFR BLD: 0.5 %
NEUTROPHILS # BLD AUTO: 18.7 K/UL
NEUTROPHILS NFR BLD: 98.6 %
NRBC BLD-RTO: 0 /100 WBC
PHOSPHATE SERPL-MCNC: 3.6 MG/DL
PLATELET # BLD AUTO: 122 K/UL
PMV BLD AUTO: 10.1 FL
POCT GLUCOSE: 172 MG/DL (ref 70–110)
POCT GLUCOSE: 187 MG/DL (ref 70–110)
POCT GLUCOSE: 213 MG/DL (ref 70–110)
POCT GLUCOSE: 228 MG/DL (ref 70–110)
POTASSIUM SERPL-SCNC: 4.9 MMOL/L
RBC # BLD AUTO: 3.1 M/UL
SODIUM SERPL-SCNC: 139 MMOL/L
TACROLIMUS BLD-MCNC: 5.3 NG/ML
WBC # BLD AUTO: 19.01 K/UL

## 2019-01-16 PROCEDURE — 99232 SBSQ HOSP IP/OBS MODERATE 35: CPT | Mod: ,,, | Performed by: NURSE PRACTITIONER

## 2019-01-16 PROCEDURE — 97165 OT EVAL LOW COMPLEX 30 MIN: CPT

## 2019-01-16 PROCEDURE — 25000003 PHARM REV CODE 250: Performed by: SURGERY

## 2019-01-16 PROCEDURE — 81100000 HC KIDNEY ACQUISITION-LIVE DONOR

## 2019-01-16 PROCEDURE — 63600175 PHARM REV CODE 636 W HCPCS: Performed by: NURSE PRACTITIONER

## 2019-01-16 PROCEDURE — 99233 SBSQ HOSP IP/OBS HIGH 50: CPT | Mod: ,,, | Performed by: NURSE PRACTITIONER

## 2019-01-16 PROCEDURE — 99233 PR SUBSEQUENT HOSPITAL CARE,LEVL III: ICD-10-PCS | Mod: ,,, | Performed by: NURSE PRACTITIONER

## 2019-01-16 PROCEDURE — 99232 PR SUBSEQUENT HOSPITAL CARE,LEVL II: ICD-10-PCS | Mod: ,,, | Performed by: NURSE PRACTITIONER

## 2019-01-16 PROCEDURE — 97161 PT EVAL LOW COMPLEX 20 MIN: CPT

## 2019-01-16 PROCEDURE — 25000003 PHARM REV CODE 250: Performed by: PHYSICIAN ASSISTANT

## 2019-01-16 PROCEDURE — 20600001 HC STEP DOWN PRIVATE ROOM

## 2019-01-16 PROCEDURE — 83735 ASSAY OF MAGNESIUM: CPT

## 2019-01-16 PROCEDURE — 85025 COMPLETE CBC W/AUTO DIFF WBC: CPT

## 2019-01-16 PROCEDURE — 25000003 PHARM REV CODE 250: Performed by: NURSE PRACTITIONER

## 2019-01-16 PROCEDURE — 63600175 PHARM REV CODE 636 W HCPCS: Performed by: SURGERY

## 2019-01-16 PROCEDURE — 80069 RENAL FUNCTION PANEL: CPT

## 2019-01-16 PROCEDURE — 80197 ASSAY OF TACROLIMUS: CPT

## 2019-01-16 RX ORDER — TACROLIMUS 1 MG/1
1 CAPSULE ORAL ONCE
Status: COMPLETED | OUTPATIENT
Start: 2019-01-16 | End: 2019-01-16

## 2019-01-16 RX ORDER — CALCIUM CARBONATE 200(500)MG
500 TABLET,CHEWABLE ORAL 3 TIMES DAILY PRN
Status: DISCONTINUED | OUTPATIENT
Start: 2019-01-16 | End: 2019-01-18 | Stop reason: HOSPADM

## 2019-01-16 RX ORDER — TACROLIMUS 1 MG/1
2 CAPSULE ORAL 2 TIMES DAILY
Status: DISCONTINUED | OUTPATIENT
Start: 2019-01-16 | End: 2019-01-17

## 2019-01-16 RX ADMIN — TACROLIMUS 1 MG: 1 CAPSULE ORAL at 11:01

## 2019-01-16 RX ADMIN — NYSTATIN 500000 UNITS: 500000 SUSPENSION ORAL at 01:01

## 2019-01-16 RX ADMIN — INSULIN ASPART 7 UNITS: 100 INJECTION, SOLUTION INTRAVENOUS; SUBCUTANEOUS at 05:01

## 2019-01-16 RX ADMIN — MUPIROCIN 1 G: 20 OINTMENT TOPICAL at 07:01

## 2019-01-16 RX ADMIN — DOCUSATE SODIUM 100 MG: 100 CAPSULE, LIQUID FILLED ORAL at 08:01

## 2019-01-16 RX ADMIN — HEPARIN SODIUM 5000 UNITS: 5000 INJECTION, SOLUTION INTRAVENOUS; SUBCUTANEOUS at 01:01

## 2019-01-16 RX ADMIN — HEPARIN SODIUM 5000 UNITS: 5000 INJECTION, SOLUTION INTRAVENOUS; SUBCUTANEOUS at 05:01

## 2019-01-16 RX ADMIN — SODIUM BICARBONATE 650 MG TABLET 1300 MG: at 08:01

## 2019-01-16 RX ADMIN — BISACODYL 10 MG: 5 TABLET, COATED ORAL at 08:01

## 2019-01-16 RX ADMIN — FAMOTIDINE 20 MG: 20 TABLET ORAL at 08:01

## 2019-01-16 RX ADMIN — CALCIUM CARBONATE (ANTACID) CHEW TAB 500 MG 500 MG: 500 CHEW TAB at 11:01

## 2019-01-16 RX ADMIN — INSULIN ASPART 2 UNITS: 100 INJECTION, SOLUTION INTRAVENOUS; SUBCUTANEOUS at 05:01

## 2019-01-16 RX ADMIN — DIPHENHYDRAMINE HYDROCHLORIDE 25 MG: 25 CAPSULE ORAL at 08:01

## 2019-01-16 RX ADMIN — MYCOPHENOLATE MOFETIL 500 MG: 250 CAPSULE ORAL at 07:01

## 2019-01-16 RX ADMIN — TACROLIMUS 2 MG: 1 CAPSULE ORAL at 05:01

## 2019-01-16 RX ADMIN — OXYCODONE HYDROCHLORIDE 10 MG: 10 TABLET ORAL at 08:01

## 2019-01-16 RX ADMIN — INSULIN DETEMIR 20 UNITS: 100 INJECTION, SOLUTION SUBCUTANEOUS at 08:01

## 2019-01-16 RX ADMIN — SULFAMETHOXAZOLE AND TRIMETHOPRIM 1 TABLET: 400; 80 TABLET ORAL at 07:01

## 2019-01-16 RX ADMIN — NYSTATIN 500000 UNITS: 500000 SUSPENSION ORAL at 08:01

## 2019-01-16 RX ADMIN — OXYCODONE HYDROCHLORIDE 10 MG: 10 TABLET ORAL at 09:01

## 2019-01-16 RX ADMIN — DOCUSATE SODIUM 100 MG: 100 CAPSULE, LIQUID FILLED ORAL at 07:01

## 2019-01-16 RX ADMIN — TACROLIMUS 1 MG: 1 CAPSULE ORAL at 07:01

## 2019-01-16 RX ADMIN — MYCOPHENOLATE MOFETIL 500 MG: 250 CAPSULE ORAL at 08:01

## 2019-01-16 RX ADMIN — NYSTATIN 500000 UNITS: 500000 SUSPENSION ORAL at 05:01

## 2019-01-16 RX ADMIN — DIPHENHYDRAMINE HYDROCHLORIDE 25 MG: 25 CAPSULE ORAL at 09:01

## 2019-01-16 RX ADMIN — HEPARIN SODIUM 5000 UNITS: 5000 INJECTION, SOLUTION INTRAVENOUS; SUBCUTANEOUS at 08:01

## 2019-01-16 RX ADMIN — LEVOTHYROXINE SODIUM 75 MCG: 25 TABLET ORAL at 05:01

## 2019-01-16 RX ADMIN — INSULIN ASPART 7 UNITS: 100 INJECTION, SOLUTION INTRAVENOUS; SUBCUTANEOUS at 07:01

## 2019-01-16 RX ADMIN — NYSTATIN 500000 UNITS: 500000 SUSPENSION ORAL at 07:01

## 2019-01-16 RX ADMIN — INSULIN ASPART 2 UNITS: 100 INJECTION, SOLUTION INTRAVENOUS; SUBCUTANEOUS at 11:01

## 2019-01-16 RX ADMIN — METHYLPREDNISOLONE SODIUM SUCCINATE 125 MG: 125 INJECTION, POWDER, FOR SOLUTION INTRAMUSCULAR; INTRAVENOUS at 07:01

## 2019-01-16 RX ADMIN — MUPIROCIN 1 G: 20 OINTMENT TOPICAL at 08:01

## 2019-01-16 RX ADMIN — INSULIN ASPART 7 UNITS: 100 INJECTION, SOLUTION INTRAVENOUS; SUBCUTANEOUS at 11:01

## 2019-01-16 RX ADMIN — THERA TABS 1 TABLET: TAB at 07:01

## 2019-01-16 RX ADMIN — SODIUM BICARBONATE 650 MG TABLET 1300 MG: at 07:01

## 2019-01-16 RX ADMIN — VILAZODONE HYDROCHLORIDE 40 MG: 10 TABLET ORAL at 07:01

## 2019-01-16 RX ADMIN — DOCUSATE SODIUM 100 MG: 100 CAPSULE, LIQUID FILLED ORAL at 01:01

## 2019-01-16 NOTE — PLAN OF CARE
Problem: Occupational Therapy Goal  Goal: Occupational Therapy Goal  Goals to be met by: 1/26/19    Patient will increase functional independence with ADLs by performing:    UE Dressing with Supervision.  LE Dressing with Supervision.  Grooming while standing at sink with Supervision.  Toileting from toilet with Supervision for hygiene and clothing management.   Supine to sit with Modified [place].  Toilet transfer to toilet with Supervision.    Outcome: Ongoing (interventions implemented as appropriate)  Evaluation completed. Initiate POC.   Aimee cowan OT  1/16/2019

## 2019-01-16 NOTE — PLAN OF CARE
Problem: Adult Inpatient Plan of Care  Goal: Plan of Care Review  Recommendation/Intervention:   1. When medically able ADAT per team   2. Encourage good po intake   3. Post transplant education provided   4. Dietitian Following    Goals: Patient to meet >85% of EEN/EPN  Nutrition Goal Status: progressing towards goal    Nutrition Discharge Planning: Post transplant nutrition education provided. Food safety/drug interactions emphasized. General healthy diet recommended. Dietitian name/contact information, education material left.  No other needs identified. Caregiver present.

## 2019-01-16 NOTE — PROGRESS NOTES
[facility]  Kidney Transplant  Progress Note      Reason for Follow-up: Reassessment of Kidney Transplant - 1/14/2019  (#1) recipient and management of immunosuppression.      Subjective:   History of Present Illness:  Andra Newell is a 38 y/o F with ESRD 2/2 DM II started on HD 4/13/18 on Monday/Friday schedule, stopped after 4 months (September 2018) due to stable renal function. Patient essentially pre-dialysis. She is now s/p LURT 1/14/19 (Campath induction, CMV D+/R-, TIT 58 min). Of note, pre-txp, patient used scooter at home and for long distances due to multiple knee replacements and achilles tendon injury. Surgery uncomplicated.     Interval History: no acute events overnight. Progressing well. Cr down to 1.0, excellent uop. Pain well controlled. (+) flatus, (-) BM, continue bowel regimen. Deescalated diet to clears yesterday due to indigestion, diet advanced to regular today, restarted protonix, maalox & TUMS PRN. PT/OT eval pending, ambulated in [place] with PT/OT today, scooter brought to bedside. Grace to be removed tomorrow on POD#3.       Past Medical, Surgical, Family, and Social History:   Unchanged from H&P.    Scheduled Meds:   bisacodyl  10 mg Oral QHS    docusate sodium  100 mg Oral TID    ergocalciferol  50,000 Units Oral Q7 Days    famotidine  20 mg Oral QHS    heparin (porcine)  5,000 Units Subcutaneous Q8H    insulin aspart U-100  7 Units Subcutaneous TIDWM    insulin detemir U-100  20 Units Subcutaneous QHS    levothyroxine  75 mcg Oral Before breakfast    multivitamin  1 tablet Oral Daily    mupirocin  1 g Nasal BID    mycophenolate  500 mg Oral BID    nystatin  500,000 Units Mouth/Throat QID (PC + HS)    sodium bicarbonate  1,300 mg Oral BID    sulfamethoxazole-trimethoprim 400-80mg  1 tablet Oral Daily    tacrolimus  1 mg Oral BID    tacrolimus  1 mg Oral Once    tacrolimus  2 mg Oral BID    [START ON 1/25/2019] valGANciclovir  450 mg Oral Daily     vilazodone  40 mg Oral Daily     Continuous Infusions:   glucagon (human recombinant)       PRN Meds:acetaminophen, aluminum-magnesium hydroxide-simethicone, calcium carbonate, dextrose 50%, dextrose 50%, diphenhydrAMINE, glucagon (human recombinant), glucose, glucose, glucose, insulin aspart U-100, naloxone, ondansetron, oxyCODONE, oxyCODONE, sodium chloride 0.9%    Intake/Output - Last 3 Shifts       01/14 0700 - 01/15 0659 01/15 0700 - 01/16 0659 01/16 0700 - 01/17 0659    P.O. 570 2060     I.V. (mL/kg) 5367.3 (51.6) 1906.2 (18.3)     Other  0     IV Piggyback  100     Total Intake(mL/kg) 5937.3 (57.1) 4066.2 (39.1)     Urine (mL/kg/hr) 4610 3375 (1.4)     Emesis/NG output 0 0     Other  0     Stool  0     Blood  0     Total Output 4610 3375     Net +1327.3 +691.2            Urine Occurrence  1 x     Emesis Occurrence 1 x             Review of Systems   Constitutional: Negative for chills and fever.   HENT: Negative for congestion, sore throat and trouble swallowing.    Respiratory: Negative for cough, shortness of breath and wheezing.    Cardiovascular: Negative for chest pain, palpitations and leg swelling.   Gastrointestinal: Positive for abdominal pain. Negative for abdominal distention, nausea and vomiting.        + acid reflux   Genitourinary: Negative for decreased urine volume, dysuria and hematuria.   Musculoskeletal: Positive for gait problem (chronic due to multiple knee surgery and achilles tendon injury). Negative for neck pain and neck stiffness.   Skin: Positive for wound. Negative for rash.   Allergic/Immunologic: Positive for immunocompromised state.   Neurological: Negative for tremors, syncope and light-headedness.   Psychiatric/Behavioral: Negative for agitation, behavioral problems and confusion.      Objective:     Vital Signs (Most Recent):  Temp: 98.6 °F (37 °C) (01/16/19 0711)  Pulse: 86 (01/16/19 0711)  Resp: 19 (01/16/19 0711)  BP: (!) 147/77 (01/16/19 0711)  SpO2: 95 % (01/16/19 0711)  "Vital Signs (24h Range):  Temp:  [98 °F (36.7 °C)-98.9 °F (37.2 °C)] 98.6 °F (37 °C)  Pulse:  [] 86  Resp:  [11-20] 19  SpO2:  [94 %-96 %] 95 %  BP: (135-149)/(58-81) 147/77     Weight: 103.9 kg (229 lb 0.9 oz)  Height: 5' 6.5" (168.9 cm)  Body mass index is 36.42 kg/m².    Physical Exam   Constitutional: She is oriented to person, place, and time. She is cooperative.   HENT:   Head: Normocephalic and atraumatic.   Eyes: EOM are normal. No scleral icterus.   Neck: Normal range of motion. Neck supple. No JVD present.   Cardiovascular: Normal rate, regular rhythm and normal heart sounds.   Pulmonary/Chest: Effort normal and breath sounds normal. No respiratory distress. She has no wheezes. She has no rales.   Abdominal: Soft. Bowel sounds are normal. She exhibits distension. There is tenderness. There is no guarding.   RLQ incision intact with staples - minimal serosanguinous drainage   Musculoskeletal: She exhibits no edema.   Neurological: She is alert and oriented to person, place, and time.   Skin: Skin is warm and dry.   Psychiatric: She has a normal mood and affect. Her behavior is normal. Thought content normal.   Nursing note and vitals reviewed.      Laboratory:  CBC:   Recent Labs   Lab 01/15/19  0600 01/15/19  1517 01/16/19  0500   WBC 28.34* 24.93* 19.01*   RBC 3.32* 3.14* 3.10*   HGB 10.0* 9.5* 9.3*   HCT 32.6* 29.5* 29.7*    138* 122*   MCV 98 94 96   MCH 30.1 30.3 30.0   MCHC 30.7* 32.2 31.3*     CMP:   Recent Labs   Lab 01/14/19  0904  01/15/19  0600 01/15/19  1517 01/16/19  0500   GLU 88  88   < > 292* 197* 218*   CALCIUM 9.9  9.9   < > 8.3* 8.2* 8.7   ALBUMIN 3.8  3.8   < > 3.1*  3.1* 2.9* 3.0*   PROT 8.0  --  6.6 6.5  --      144   < > 142 139 139   K 5.6*  5.6*   < > 5.1 4.5 4.9   CO2 20*  20*   < > 17* 18* 20*   *  116*   < > 117* 114* 113*   BUN 82*  82*   < > 57* 42* 30*   CREATININE 3.7*  3.7*   < > 2.3* 1.4 1.0   ALKPHOS 192*  --  142* 126  --    ALT 22  --  " "23 19  --    AST 17  --  36 31  --     < > = values in this interval not displayed.     Coagulation:   Recent Labs   Lab 01/14/19  0904   APTT 25.2     Labs within the past 24 hours have been reviewed.    Diagnostic Results:  Recent/pertinent diagnostic studies reviewed.    Assessment/Plan:     * Status post living-donor kidney transplantation    - ESRD 2/2 DM II and HTN, pre-HD  - s/p LURT 1/14/16 2/2 DMII (Campath ind, CMV D+/R-, WIT 58 min)  - Cr trending down, 1.0 today, excellent uop  - bull x 3 days, to be removed tomorrow  - Poorly tolerating diet due to indigestion, continue protonix and TUMS/maalox PRN   - pain well controlled. (+) flatus, (-) BM, continue bowel regimen     Metabolic acidosis    - Start Bicarb 1300 mg BID  - Monitor w/ daily labs     Vitamin D deficiency    - Vit D 12 1/14  - Start Ergo weekly     At risk for opportunistic infections    - continue bactrim for 1 year for PCP prophylaxis  - continue nystatin  - valcyte to start on discharge or POD#10 (CMV D+/R-)  - ID saw pt re: enrolled in Valcyte drug trial     Long-term use of immunosuppressant medication    - See "prophylactic immunotherapy."     Adverse effect of adrenal cortical steroids, sequela    - Monitor.       BMI 36.0-36.9,adult    - Monitor.       Prophylactic immunotherapy    - Continue Prograf, Cellcept, steroids per Campath induction protocol.  - Monitor Prograf trough level daily and adjust dose as needed to achieve therapeutic level.     Arthritis    - weakness pre-txp used scooter for long distances due to multiple knee surgeries and achilles tendon injury  - PT/OT eval, recommended home health PT/OT     Depression    - continue Viibryd, patient reports withdrawal symptoms when med held.      Uncontrolled type 2 diabetes mellitus with stage 5 chronic kidney disease not on chronic dialysis, with long-term current use of insulin    - Endocrine consulted, appreciate recs.  - Monitor.         Discharge Planning: not stable " for discharge at this time. Discharge tomorrow pending clinical status and renal function.     Lisbeth Rodriguez DNP  Kidney Transplant  [facility]

## 2019-01-16 NOTE — SUBJECTIVE & OBJECTIVE
Subjective:   History of Present Illness:  Andra Newell is a 40 y/o F with ESRD 2/2 DM II started on HD 4/13/18 on Monday/Friday schedule, stopped after 4 months (September 2018) due to stable renal function. Patient essentially pre-dialysis. She is now s/p LURT 1/14/19 (Campath induction, CMV D+/R-, TIT 58 min). Of note, pre-txp, patient used scooter at home and for long distances due to multiple knee replacements and achilles tendon injury. Surgery uncomplicated.     Interval History: no acute events overnight. Progressing well. Cr down to 1.0, excellent uop. Pain well controlled. (+) flatus, (-) BM, continue bowel regimen. Deescalated diet to clears yesterday due to indigestion, diet advanced to regular today, restarted protonix, maalox & TUMS PRN. PT/OT eval pending, ambulated in [place] with PT/OT today, scooter brought to bedside. Grace to be removed tomorrow on POD#3.       Past Medical, Surgical, Family, and Social History:   Unchanged from H&P.    Scheduled Meds:   bisacodyl  10 mg Oral QHS    docusate sodium  100 mg Oral TID    ergocalciferol  50,000 Units Oral Q7 Days    famotidine  20 mg Oral QHS    heparin (porcine)  5,000 Units Subcutaneous Q8H    insulin aspart U-100  7 Units Subcutaneous TIDWM    insulin detemir U-100  20 Units Subcutaneous QHS    levothyroxine  75 mcg Oral Before breakfast    multivitamin  1 tablet Oral Daily    mupirocin  1 g Nasal BID    mycophenolate  500 mg Oral BID    nystatin  500,000 Units Mouth/Throat QID (PC + HS)    sodium bicarbonate  1,300 mg Oral BID    sulfamethoxazole-trimethoprim 400-80mg  1 tablet Oral Daily    tacrolimus  1 mg Oral BID    tacrolimus  1 mg Oral Once    tacrolimus  2 mg Oral BID    [START ON 1/25/2019] valGANciclovir  450 mg Oral Daily    vilazodone  40 mg Oral Daily     Continuous Infusions:   glucagon (human recombinant)       PRN Meds:acetaminophen, aluminum-magnesium hydroxide-simethicone, calcium carbonate, dextrose 50%,  dextrose 50%, diphenhydrAMINE, glucagon (human recombinant), glucose, glucose, glucose, insulin aspart U-100, naloxone, ondansetron, oxyCODONE, oxyCODONE, sodium chloride 0.9%    Intake/Output - Last 3 Shifts       01/14 0700 - 01/15 0659 01/15 0700 - 01/16 0659 01/16 0700 - 01/17 0659    P.O. 570 2060     I.V. (mL/kg) 5367.3 (51.6) 1906.2 (18.3)     Other  0     IV Piggyback  100     Total Intake(mL/kg) 5937.3 (57.1) 4066.2 (39.1)     Urine (mL/kg/hr) 4610 3375 (1.4)     Emesis/NG output 0 0     Other  0     Stool  0     Blood  0     Total Output 4610 3375     Net +1327.3 +691.2            Urine Occurrence  1 x     Emesis Occurrence 1 x             Review of Systems   Constitutional: Negative for chills and fever.   HENT: Negative for congestion, sore throat and trouble swallowing.    Respiratory: Negative for cough, shortness of breath and wheezing.    Cardiovascular: Negative for chest pain, palpitations and leg swelling.   Gastrointestinal: Positive for abdominal pain. Negative for abdominal distention, nausea and vomiting.        + acid reflux   Genitourinary: Negative for decreased urine volume, dysuria and hematuria.   Musculoskeletal: Positive for gait problem (chronic due to multiple knee surgery and achilles tendon injury). Negative for neck pain and neck stiffness.   Skin: Positive for wound. Negative for rash.   Allergic/Immunologic: Positive for immunocompromised state.   Neurological: Negative for tremors, syncope and light-headedness.   Psychiatric/Behavioral: Negative for agitation, behavioral problems and confusion.      Objective:     Vital Signs (Most Recent):  Temp: 98.6 °F (37 °C) (01/16/19 0711)  Pulse: 86 (01/16/19 0711)  Resp: 19 (01/16/19 0711)  BP: (!) 147/77 (01/16/19 0711)  SpO2: 95 % (01/16/19 0711) Vital Signs (24h Range):  Temp:  [98 °F (36.7 °C)-98.9 °F (37.2 °C)] 98.6 °F (37 °C)  Pulse:  [] 86  Resp:  [11-20] 19  SpO2:  [94 %-96 %] 95 %  BP: (135-149)/(58-81) 147/77     Weight:  "103.9 kg (229 lb 0.9 oz)  Height: 5' 6.5" (168.9 cm)  Body mass index is 36.42 kg/m².    Physical Exam   Constitutional: She is oriented to person, place, and time. She is cooperative.   HENT:   Head: Normocephalic and atraumatic.   Eyes: EOM are normal. No scleral icterus.   Neck: Normal range of motion. Neck supple. No JVD present.   Cardiovascular: Normal rate, regular rhythm and normal heart sounds.   Pulmonary/Chest: Effort normal and breath sounds normal. No respiratory distress. She has no wheezes. She has no rales.   Abdominal: Soft. Bowel sounds are normal. She exhibits distension. There is tenderness. There is no guarding.   RLQ incision intact with staples - minimal serosanguinous drainage   Musculoskeletal: She exhibits no edema.   Neurological: She is alert and oriented to person, place, and time.   Skin: Skin is warm and dry.   Psychiatric: She has a normal mood and affect. Her behavior is normal. Thought content normal.   Nursing note and vitals reviewed.      Laboratory:  CBC:   Recent Labs   Lab 01/15/19  0600 01/15/19  1517 01/16/19  0500   WBC 28.34* 24.93* 19.01*   RBC 3.32* 3.14* 3.10*   HGB 10.0* 9.5* 9.3*   HCT 32.6* 29.5* 29.7*    138* 122*   MCV 98 94 96   MCH 30.1 30.3 30.0   MCHC 30.7* 32.2 31.3*     CMP:   Recent Labs   Lab 01/14/19  0904  01/15/19  0600 01/15/19  1517 01/16/19  0500   GLU 88  88   < > 292* 197* 218*   CALCIUM 9.9  9.9   < > 8.3* 8.2* 8.7   ALBUMIN 3.8  3.8   < > 3.1*  3.1* 2.9* 3.0*   PROT 8.0  --  6.6 6.5  --      144   < > 142 139 139   K 5.6*  5.6*   < > 5.1 4.5 4.9   CO2 20*  20*   < > 17* 18* 20*   *  116*   < > 117* 114* 113*   BUN 82*  82*   < > 57* 42* 30*   CREATININE 3.7*  3.7*   < > 2.3* 1.4 1.0   ALKPHOS 192*  --  142* 126  --    ALT 22  --  23 19  --    AST 17  --  36 31  --     < > = values in this interval not displayed.     Coagulation:   Recent Labs   Lab 01/14/19  0904   APTT 25.2     Labs within the past 24 hours have been " reviewed.    Diagnostic Results:  Recent/pertinent diagnostic studies reviewed.

## 2019-01-16 NOTE — PROGRESS NOTES
" [facility]  Adult Nutrition  Progress Note     SUMMARY       Recommendations  Recommendation/Intervention:   1. When medically able ADAT per team   2. Encourage good po intake   3. Post transplant education provided   4. Dietitian Following    Goals: Patient to meet >85% of EEN/EPN  Nutrition Goal Status: progressing towards goal  Communication of RD Recs: (POC)    General Information Comments: Pt s/p OKTx 1/14/19, ESRD 2/2 Diabetes Mellitus - Type II. Diet advanced back to Diabetic/Low K from Clear Liquids, 25-50% intake. Reports issues with . Post transplant education provided. Patient appears nourished. Malnutrition not indicated at this time.    Nutrition Discharge Planning: Post transplant nutrition education provided. Food safety/drug interactions emphasized. General healthy diet recommended. Dietitian name/contact information, education material left.  No other needs identified. Caregiver present.     Reason for Assessment  Reason For Assessment: RD follow-up  Diagnosis: transplant/postoperative complications(OKTx 1/14/19)  Relevant Medical History: ESRD 2/2 Diabetes Mellitus - Type II  Interdisciplinary Rounds: did not attend    Nutrition Risk Screen  Nutrition Risk Screen: no indicators present    Nutrition/Diet History  Patient Reported Diet/Restrictions/Preferences: diabetic diet, general  Spiritual, Cultural Beliefs, Anabaptist Practices, Values that Affect Care: no  Food Allergies: NKFA  Factors Affecting Nutritional Intake: clear liquid diet    Anthropometrics  Temp: 98.6 °F (37 °C)  Height Method: Stated  Height: 5' 6.5" (168.9 cm)  Height (inches): 66.5 in  Weight Method: Bed Scale  Weight: 103.9 kg (229 lb 0.9 oz)  Weight (lb): 229.06 lb  Ideal Body Weight (IBW), Female: 132.5 lb  % Ideal Body Weight, Female (lb): 167.05 lb  BMI (Calculated): 35.3  BMI Grade: 35 - 39.9 - obesity - grade II     Lab/Procedures/Meds  Labs: Reviewed  Lab Results   Component Value Date    "  01/16/2019    K 4.9 01/16/2019     (H) 01/16/2019    CO2 20 (L) 01/16/2019    BUN 30 (H) 01/16/2019    CREATININE 1.0 01/16/2019    CALCIUM 8.7 01/16/2019    PHOS 3.6 01/16/2019    MG 2.3 01/16/2019    ESTGFRAFRICA >60.0 01/16/2019    EGFRNONAA >60.0 01/16/2019    ALBUMIN 3.0 (L) 01/16/2019     Meds: Reviewed  Scheduled Meds:   bisacodyl  10 mg Oral QHS    docusate sodium  100 mg Oral TID    ergocalciferol  50,000 Units Oral Q7 Days    famotidine  20 mg Oral QHS    heparin (porcine)  5,000 Units Subcutaneous Q8H    insulin aspart U-100  7 Units Subcutaneous TIDWM    insulin detemir U-100  20 Units Subcutaneous QHS    levothyroxine  75 mcg Oral Before breakfast    multivitamin  1 tablet Oral Daily    mupirocin  1 g Nasal BID    mycophenolate  500 mg Oral BID    nystatin  500,000 Units Mouth/Throat QID (PC + HS)    sodium bicarbonate  1,300 mg Oral BID    sulfamethoxazole-trimethoprim 400-80mg  1 tablet Oral Daily    tacrolimus  1 mg Oral BID     Estimated/Assessed Needs  Weight Used For Calorie Calculations: 104 kg (229 lb 4.5 oz)  Energy Calorie Requirements (kcal): 2042(PAL 1.25)  Energy Need Method: [place]-St Jeor(PAL 1.25)  Protein Requirements: 104(1.0 gm/kg)  Weight Used For Protein Calculations: 104 kg (229 lb 4.5 oz)  Fluid Requirements (mL): 1mL/kcal or per MD  Estimated Fluid Requirement Method: RDA Method(1mL/kcal or per MD)  RDA Method (mL): 2042     Nutrition Prescription Ordered  Current Diet Order: Diabetic , Low K    Evaluation of Received Nutrient/Fluid Intake in last 24h  I/O: +2.0L since admit  Comments: LBM 1/14/19  % Intake of Estimated Energy Needs: 25 - 50 %  % Meal Intake: 25 - 50 %    Nutrition Risk  Level of Risk/Frequency of Follow-up: low(1x week)     Assessment and Plan  Nutrition Problem  Increased nutrient needs     Related to (etiology):   Physiological causes related to healing     Signs and Symptoms (as evidenced by):   S/p OKTx  1/14/19     Interventions/Recommendations (treatment strategy):  Increased calorie and protein diet     Nutrition Diagnosis Status:   Continues    Monitor and Evaluation  Food and Nutrient Intake: energy intake, food and beverage intake  Food and Nutrient Adminstration: diet order  Anthropometric Measurements: weight change, weight  Biochemical Data, Medical Tests and Procedures: lipid profile, inflammatory profile, glucose/endocrine profile, gastrointestinal profile, electrolyte and renal panel  Nutrition-Focused Physical Findings: overall appearance     Nutrition Follow-Up  RD Follow-up?: Yes

## 2019-01-16 NOTE — ASSESSMENT & PLAN NOTE
- continue bactrim for 1 year for PCP prophylaxis  - continue nystatin  - valcyte to start on discharge or POD#10 (CMV D+/R-)  - ID saw pt re: enrolled in Valcyte drug trial

## 2019-01-16 NOTE — ASSESSMENT & PLAN NOTE
- ESRD 2/2 DM II and HTN, pre-HD  - s/p LURT 1/14/16 2/2 DMII (Campath ind, CMV D+/R-, WIT 58 min)  - Cr trending down, 1.0 today, excellent uop  - bull x 3 days, to be removed tomorrow  - Poorly tolerating diet due to indigestion, continue protonix and TUMS/maalox PRN   - pain well controlled. (+) flatus, (-) BM, continue bowel regimen

## 2019-01-16 NOTE — ASSESSMENT & PLAN NOTE
BG goal 140-180    Start Levemir 20 units HS  Novolog 7 units TIDWM  Low correction scale.   BG monitoring AC/HS.     Discharge Recommendations:   TBD. Expect for patient to continue her home DM regimen.

## 2019-01-16 NOTE — PT/OT/SLP EVAL
Physical Therapy Evaluation    Patient Name:  Andra Newell   MRN:  [id number]    Recommendations:     Discharge Recommendations:  ( PT )   Discharge Equipment Recommendations: tub bench, walker, rolling   Barriers to discharge: None    Assessment:     Andra Newell is a 58 y.o. female admitted with a medical diagnosis of Status post living-donor kidney transplantation.  She presents with the following impairments/functional limitations:  weakness, impaired endurance, gait instability, impaired functional mobilty, pain, impaired balance, decreased lower extremity function. Pt in good spirits and actively participated in evaluation activities without increase in pain from baseline. Pt lives in a 1 story home with her significant other who has taken off of work for 4 weeks to assist with care upon d/c from hospital. PTA, pt required Hussein from significant other for ADLs and household chores, ambulated independently for household distances, and used a power scooter for community distances due to fatigue and SOB. Pt is not currently working, is still driving, and reports taking frequent rest breaks throughout the day. Upon evaluation, pt was SBA for bed mobility and CGA for transfers and ambulation with RW. Pt required min VCs for hand placement during sit to/from stand and was able to ambulate 60ft with RW before fatigue with decreased gait speed. Pt would benefit from acute skilled PT services at this time to improve functional mobility, endurance, and ambulation to return to her PLOF for a safe d/c home.       Rehab Prognosis: Good; patient would benefit from acute skilled PT services to address these deficits and reach maximum level of function.    Recent Surgery: Procedure(s) (LRB):  TRANSPLANT, KIDNEY (N/A) 2 Days Post-Op    Plan:     During this hospitalization, patient to be seen   to address the identified rehab impairments via gait training, therapeutic activities, therapeutic exercises,  neuromuscular re-education and progress toward the following goals:    · Plan of Care Expires:  02/15/19    Subjective     Chief Complaint: surgical pain   Patient/Family Comments/goals: want to start moving around better   Pain/Comfort:  · Pain Rating 1: 5/10  · Location - Orientation 1: generalized  · Location 1: (surgical)  · Pain Addressed 1: Reposition, Pre-medicate for activity, Distraction  · Pain Rating Post-Intervention 1: 5/10    Patients cultural, spiritual, Zoroastrian conflicts given the current situation: no    Living Environment:  Pt lives with her significant other in a 1 story house with a ramp to enter and a tub shower.   Prior to admission, patients level of function was Hussein for bathing and household chores, Independent ambulation household distances, power scooter for community distances.  Equipment used at home: power chair.  DME owned (not currently used): standard walker.  Upon discharge, patient will have assistance from significant other and friends.    Objective:     Communicated with RN prior to session.  Patient found all lines intact, call button in reach and significant other  present bull catheter  upon PT entry to room.    General Precautions: Standard, fall   Orthopedic Precautions:N/A   Braces: N/A     Exams:  · Cognitive Exam:  Patient is oriented to Person, Place, Time and Situation  · RLE ROM: WFL  · RLE Strength: WFL  · LLE ROM: WFL  · LLE Strength: WFL    Functional Mobility:  · Bed Mobility:    · Scooting: stand by assistance (in sitting to EOB)   · Supine to Sit: stand by assistance  · Transfers:    · Sit to Stand:  contact guard assistance with rolling walker: x1 from EOB   · Stand to Sit: contact guard assistance with rolling walker: x1 to bedside chair   · Gait:   · 60ft with RW and CGA  · Pt ambulated with decreased step length, tawanna, gait speed, and heel strike with 0 LOB   · Balance:   · Static Sit: SBA   · Static Stand: CGA      Therapeutic Activities and  Exercises:   Pt educated on PT goals, role, and POC. Pt and significant other educated she is safe to ambulate in room with RN or family with RW.     AM-PAC 6 CLICK MOBILITY  Total Score:20     Patient left up in chair with all lines intact, call button in reach and significant other present.    GOALS:   Multidisciplinary Problems     Physical Therapy Goals        Problem: Physical Therapy Goal    Goal Priority Disciplines Outcome Goal Variances Interventions   Physical Therapy Goal     PT, PT/OT      Description:  Goals to be met by: 19    Patient will increase functional independence with mobility by performin. Supine to sit with [place]  2. Sit to supine with [place]  3. Sit to stand transfer with Stand-by Assistance with RW   4. Gait  x 200 feet with Supervision using Rolling Walker.                       History:     Past Medical History:   Diagnosis Date    Anemia     Anemia in chronic kidney disease, on chronic dialysis 2017    Arthritis     Asthma     allergic airway    Colon polyp     Depression     Diabetes mellitus     Diverticulosis     Eosinophilia     ESRD (end stage renal disease)     stage V, due for living donor 2018    ESRD on dialysis     GERD (gastroesophageal reflux disease)     Gout     Gout     Hyperlipidemia     Hypertension     Low back pain     MRSA carrier     Obesity     Renal disorder     Rotator cuff syndrome--left     Thyroid disease     Ulnar neuropathy of right upper extremity     Uncontrolled type 2 diabetes mellitus with hyperglycemia        Past Surgical History:   Procedure Laterality Date    ACHILLES TENDON SURGERY Left May 2015    ARTHROSCOPY, HIP Left 10/3/2013    Performed [facility] at [facility] [place] OR    ARTHROSCOPY-HIP WITH TROCHANTERIC BURSECTOMY Left 10/3/2013    Performed by Moe Mel[facility]dez MD at [facility] [place] OR    ARTHROSCOPY-SHOULDER Left 2015    Performed by Moe [facility]dez MD at [facility] [place] OR     ARTHROSCOPY-SHOULDER WITH SUBACROMIAL DECOMPRESSION Left 7/12/2016    [facility]    BACK SURGERY      CHOLECYSTECTOMY      COLONOSCOPY N/A 9/6/2017    [facility]    DEBRIDEMENT-SHOULDER-ARTHROSCOPIC Left 7/12/2016    [facility] [place]    DEBRIDEMENT-SHOULDER-ARTHROSCOPIC; LABRAL Left 11/24/2015    [facility] [place]    DIALYSIS FISTULA CREATION  12/2017    FEMOROPLASTY, ARTHROSCOPIC Left 10/3/2013    [facility] [place]    HEMORRHOID SURGERY      INJECTION-AMNIOX Left 7/12/2016    [facility] [place]    JOINT REPLACEMENT      left    KNEE SURGERY      LYSIS-ADHESION Left 11/24/2015    [facility] [place]    REPAIR ROTATOR CUFF ARTHROSCOPIC Left 7/12/2016    [facility] [place]    REPAIR-TENDON-BICEP Left 11/24/2015    [facility] [place]    SHOULDER ARTHROSCOPY      SHOULDER SURGERY      TRANSPLANT, KIDNEY N/A 1/14/2019    Performed [facility] at [facility] OR [facility]    UPPER GASTROINTESTINAL ENDOSCOPY  ~2013     [place]       Clinical Decision Making:     History  Co-morbidities and personal factors that may impact the plan of care Examination  Body Structures and Functions, activity limitations and participation restrictions that may impact the plan of care Clinical Presentation   Decision Making/ Complexity Score   Co-morbidities:   [] Time since onset of injury / illness / exacerbation  [] Status of current condition  []Patient's cognitive status and safety concerns    [x] Multiple Medical Problems (see med hx)  Personal Factors:   [] Patient's age  [] Prior Level of function   [] Patient's home situation (environment and family support)  [] Patient's level of motivation  [] Expected progression of patient      HISTORY:(criteria)    [] 24657 - no personal  factors/history    [x] 37454 - has 1-2 personal factor/comorbidity     [] 87102 - has >3 personal factor/comorbidity     Body Regions:  [] Objective examination findings  [] Head     []  Neck  [] Trunk   [] Upper Extremity  [] Lower Extremity    Body Systems:  [] For communication ability, affect, cognition, language, and learning style: the assessment of the ability to make needs known, consciousness, orientation (person, place, and time), expected emotional /behavioral responses, and learning preferences (eg, learning barriers, education  needs)  [x] For the neuromuscular system: a general assessment of gross coordinated movement (eg, balance, gait, locomotion, transfers, and transitions) and motor function  (motor control and motor learning)  [x] For the musculoskeletal system: the assessment of gross symmetry, gross range of motion, gross strength, height, and weight  [] For the integumentary system: the assessment of pliability(texture), presence of scar formation, skin color, and skin integrity  [] For cardiovascular/pulmonary system: the assessment of heart rate, respiratory rate, blood pressure, and edema     Activity limitations:    [] Patient's cognitive status and saf ety concerns          [] Status of current condition      [] Weight bearing restriction  [] Cardiopulmunary Restriction    Participation Restrictions:   [] Goals and goal agreement with the patient     [] Rehab potential (prognosis) and probable outcome      Examination of Body System: (criteria)    [x] 90218 - addressing 1-2 elements    [] 69017 - addressing a total of 3 or more elements     [] 48394 -  Addressing a total of 4 or more elements         Clinical Presentation: (criteria)  Stable - 84285     On examination of body system using standardized tests and measures patient presents with 1-2 elements from any of the following: body structures and functions, activity limitations, and/or participation restrictions.  Leading to a clinical  presentation that is considered stable and/or uncomplicated                              Clinical Decision Making  (Eval Complexity):  Low- 16922     Time Tracking:     PT Received On: 01/16/19  PT Start Time: 0945     PT Stop Time: 1002  PT Total Time (min): 17 min     Billable Minutes: Evaluation 17      Erika Sanderson SPT  01/16/2019

## 2019-01-16 NOTE — ASSESSMENT & PLAN NOTE
- weakness pre-txp used scooter for long distances due to multiple knee surgeries and achilles tendon injury  - PT/OT eval, recommended home health PT/OT

## 2019-01-16 NOTE — PLAN OF CARE
Problem: Adult Inpatient Plan of Care  Goal: Plan of Care Review  Outcome: Ongoing (interventions implemented as appropriate)  AAOx3, afebrile, c/o pain. Incisional pain controlled by prn pain medication. PRN benadryl given. BG monitored ACHS and tx per orders. Pt refused CPAP. 93-94% room air. Grace in place- clear yellow urine. CVP q8h. See flowsheets. Pt able to eat 25% of clear liquid dinner. Self meds 100%. Incision care taught to caregiver. Gauze applied to middle of kidney incision for oozing. Pt able to position self independently in bed. Pt in lowest position, side rails up x2, non-skid foot wear in place, call light within reach, pt verbalized understanding to call RN when needed. Hand hygiene practiced per protocol. Will continue to monitor.

## 2019-01-16 NOTE — ASSESSMENT & PLAN NOTE
- Continue Prograf, Cellcept, steroids per [facility] induction protocol.  - Monitor Prograf trough level daily and adjust dose as needed to achieve therapeutic level.

## 2019-01-16 NOTE — SUBJECTIVE & OBJECTIVE
"Interval HPI:   Overnight events:        Remains in TSU. NAEON. BG is within goal on hospital MDI regimen. Solumedrol 125 mg IV. Patient continues to have complications with tolerating swallow screening. Diet remains labile.     Eating:   <25%  Nausea: No  Hypoglycemia and intervention: No  Fever: No  TPN and/or TF: No  If yes, type of TF/TPN and rate: None    BP (!) 147/77   Pulse 86   Temp 98.6 °F (37 °C) (Oral)   Resp 19   Ht 5' 6.5" (1.689 m)   Wt 103.9 kg (229 lb 0.9 oz)   LMP 02/28/2012 (Exact Date)   SpO2 95%   Breastfeeding? No   BMI 36.42 kg/m²     Labs Reviewed and Include    Recent Labs   Lab 01/15/19  1517 01/16/19  0500   * 218*   CALCIUM 8.2* 8.7   ALBUMIN 2.9* 3.0*   PROT 6.5  --     139   K 4.5 4.9   CO2 18* 20*   * 113*   BUN 42* 30*   CREATININE 1.4 1.0   ALKPHOS 126  --    ALT 19  --    AST 31  --    BILITOT 0.3  --      Lab Results   Component Value Date    WBC 19.01 (H) 01/16/2019    HGB 9.3 (L) 01/16/2019    HCT 29.7 (L) 01/16/2019    MCV 96 01/16/2019     (L) 01/16/2019     No results for input(s): TSH, FREET4 in the last 168 hours.  Lab Results   Component Value Date    HGBA1C 6.6 (H) 01/09/2019       Nutritional status:   Body mass index is 36.42 kg/m².  Lab Results   Component Value Date    ALBUMIN 3.0 (L) 01/16/2019    ALBUMIN 2.9 (L) 01/15/2019    ALBUMIN 3.1 (L) 01/15/2019    ALBUMIN 3.1 (L) 01/15/2019     No results found for: PREALBUMIN    Estimated Creatinine Clearance: 73.6 mL/min (based on SCr of 1 mg/dL).    Accu-Checks  Recent Labs     01/14/19  1120 01/14/19  1825 01/14/19  2315 01/15/19  0839 01/15/19  1934 01/15/19  2343 01/16/19  0739   POCTGLUCOSE 74 140* 185* 254* 126* 151* 187*       Current Medications and/or Treatments Impacting Glycemic Control  Immunotherapy:    Immunosuppressants         Stop Route Frequency     tacrolimus capsule 1 mg      -- Oral 2 times daily     mycophenolate capsule 500 mg      -- Oral 2 times daily    "     Steroids:   Hormones (From admission, onward)    None        Pressors:    Autonomic Drugs (From admission, onward)    Start     Stop Route Frequency Ordered    01/14/19 1229  cisatracurium (NIMBEX) 2 mg/mL injection     Comments:  Created by cabinet override    01/15 0044   01/14/19 1229        Hyperglycemia/Diabetes Medications:   Antihyperglycemics (From admission, onward)    Start     Stop Route Frequency Ordered    01/16/19 0715  insulin aspart U-100 pen 7 Units      -- SubQ 3 times daily with meals 01/15/19 1719    01/15/19 2100  insulin detemir U-100 pen 20 Units      -- SubQ Nightly 01/15/19 1245    01/15/19 0952  insulin aspart U-100 pen 0-5 Units      -- SubQ Before meals & nightly PRN 01/15/19 0853

## 2019-01-16 NOTE — PT/OT/SLP EVAL
Occupational Therapy   Evaluation    Name: Andra Newell  MRN: [id number]  Admitting Diagnosis:  Status post living-donor kidney transplantation 2 Days Post-Op    Recommendations:     Discharge Recommendations: (HHOT)  Discharge Equipment Recommendations:  tub bench, walker, rolling  Barriers to discharge:  None    Assessment:     Andra Newell is a 58 y.o. female with a medical diagnosis of Status post living-donor kidney transplantation.  She presents alert and motivated to participate in therapy evaluation this date. Performance deficits affecting function: impaired endurance, weakness, gait instability, impaired balance, impaired self care skills, impaired functional mobilty, pain.  Pt required CGA for functional transfers and mobility this date using RW. He would benefit from HHOT/PT following d/c to continue to progress towards goals and improve quality of life.     Rehab Prognosis: Good; patient would benefit from acute skilled OT services to address these deficits and reach maximum level of function.       Plan:     Patient to be seen 3 x/week to address the above listed problems via self-care/home management, therapeutic activities, therapeutic exercises, neuromuscular re-education  · Plan of Care Expires: 02/14/19  · Plan of Care Reviewed with: patient, significant other    Subjective     Chief Complaint: No complaints  Patient/Family Comments/goals: Return to PLOF    Occupational Profile:  Living Environment:  Pt lives with significant other; 1SH with ramp access; bathroom contains tub-combo with no DME  Previous level of function: PTA, pt reports being independent with ADL and using scooter only for long distance and not using AD for short distance; pt reports she has to sit down and take multiple breaks when completing activity/walking  2/2 fair endurance  Roles and Routines: Community dweller, drives, does not work  Equipment Used at Home:  power chair(Does not use but owns: SW)  Assistance  upon Discharge: Pt will have assistance from significant other upon discharge    Pain/Comfort:  · Pain Rating 1: 5/10  · Location - Orientation 1: generalized  · Location 1: (incision site)  · Pain Addressed 1: Pre-medicate for activity, Reposition, Distraction  · Pain Rating Post-Intervention 1: 5/10    Patients cultural, spiritual, Advent conflicts given the current situation: no    Objective:     Communicated with: RN prior to session.  Patient found supine with bull catheter, telemetry upon OT entry to room.    General Precautions: Standard, fall   Orthopedic Precautions:N/A   Braces: N/A     Occupational Performance:    Bed Mobility:    · Patient completed Rolling/Turning to Right with stand by assistance  · Patient completed Supine to Sit with stand by assistance    Functional Mobility/Transfers:  · Patient completed Sit <> Stand Transfer with contact guard assistance  with  rolling walker   · Patient completed Bed <> Chair Transfer using Stand Pivot technique with contact guard assistance with rolling walker  · Functional Mobility: Pt completed functional mobility in [place] ( ~60 ft) using RW and CGA; pt tolerated well without seated rest break     Activities of Daily Living:  · Upper Body Dressing: minimum assistance to oliver gown like jacket while seated EOB    Cognitive/Visual Perceptual:  Cognitive/Psychosocial Skills:     -       Oriented to: Person, Place, Time and Situation   -       Follows Commands/attention:Follows multistep  commands  -       Communication: clear/fluent  -       Safety awareness/insight to disability: intact   -       Mood/Affect/Coping skills/emotional control: Appropriate to situation  Visual/Perceptual:      -Intact     Physical Exam:  Postural examination/scapula alignment:    -       Rounded shoulders  Skin integrity: Visible skin intact  Edema:  None noted  Dominant hand:    -       RUE  Upper Extremity Range of Motion:     -       Right Upper Extremity: WFL  -        Left Upper Extremity: WFL  Upper Extremity Strength:    -       Right Upper Extremity: WFL  -       Left Upper Extremity: WFL   Strength:    -       Right Upper Extremity: WFL  -       Left Upper Extremity: WFL    AMPAC 6 Click ADL:  AMPAC Total Score: 21    Treatment & Education:  -Pt edu on OT role/POC; discussed DME recommendation upon d/c  -Importance of OOB activity with staff assistance  -Safety during functional t/f and mobility  - White board updated  - Multiple self care tasks completed-- assistance level noted above  - All questions/concerns answered within OT scope of practiceEducation:    - Ordered bedside commode/RW for room to increase safety during acute stay    Patient left up in chair with all lines intact, call button in reach and RN notified    GOALS:   Multidisciplinary Problems     Occupational Therapy Goals        Problem: Occupational Therapy Goal    Goal Priority Disciplines Outcome Interventions   Occupational Therapy Goal     OT, PT/OT Ongoing (interventions implemented as appropriate)    Description:  Goals to be met by: 1/26/19    Patient will increase functional independence with ADLs by performing:    UE Dressing with Supervision.  LE Dressing with Supervision.  Grooming while standing at sink with Supervision.  Toileting from toilet with Supervision for hygiene and clothing management.   Supine to sit with Modified [place].  Toilet transfer to toilet with Supervision.                      History:     Past Medical History:   Diagnosis Date    Anemia     Anemia in chronic kidney disease, on chronic dialysis 7/12/2017    Arthritis     Asthma     allergic airway    Colon polyp     Depression     Diabetes mellitus     Diverticulosis     Eosinophilia     ESRD (end stage renal disease)     stage V, due for living donor 9/2018    ESRD on dialysis     GERD (gastroesophageal reflux disease)     Gout     Gout     Hyperlipidemia     Hypertension     Low back pain      "MRSA carrier     Obesity     Renal disorder     Rotator cuff syndrome--left     Thyroid disease     Ulnar neuropathy of right upper extremity     Uncontrolled type 2 diabetes mellitus with hyperglycemia        Past Surgical History:   Procedure Laterality Date    ACHILLES TENDON SURGERY Left May 2015    ARTHROSCOPY, HIP Left 10/3/2013    [facility] OR    ARTHROSCOPY-HIP WITH TROCHANTERIC BURSECTOMY Left 10/3/2013    [facility] at [facility]    ARTHROSCOPY-SHOULDER Left 2015    [facility]    ARTHROSCOPY-SHOULDER WITH SUBACROMIAL DECOMPRESSION Left 2016    [facility]    BACK SURGERY      CHOLECYSTECTOMY      COLONOSCOPY N/A 2017    [facility] at [facility] ENDO    DEBRIDEMENT-SHOULDER-ARTHROSCOPIC Left 2016    [facility]    DEBRIDEMENT-SHOULDER-ARTHROSCOPIC; LABRAL Left 2015    [facility] at [facility]    DIALYSIS FISTULA CREATION  2017    FEMOROPLASTY, ARTHROSCOPIC Left 10/3/2013    [facility]    HEMORRHOID SURGERY      INJECTION-AMNIOX Left 2016    [facility] at [facility]    JOINT REPLACEMENT      left    KNEE SURGERY      LYSIS-ADHESION Left 2015    [facility]    REPAIR ROTATOR CUFF ARTHROSCOPIC Left 2016    [facility]    REPAIR-TENDON-BICEP Left 2015    [facility]    SHOULDER ARTHROSCOPY      SHOULDER SURGERY      TRANSPLANT, KIDNEY N/A 2019    [facility]    UPPER GASTROINTESTINAL ENDOSCOPY  ~    Dr. Moralez       Clinical Decision Makin.  OT Low:  "Pt evaluation falls under low complexity for evaluation coding due to performance deficits noted in 1-3 areas as " "stated above and 0 co-morbities affecting current functional status. Data obtained from problem focused assessments. No modifications or assistance was required for completion of evaluation. Only brief occupational profile and history review completed."     Time Tracking:     OT Date of Treatment: 01/16/19  OT Start Time: 0944  OT Stop Time: 1002  OT Total Time (min): 18 min    Billable Minutes:Evaluation 18    Aimee cowan OT  1/16/2019    "

## 2019-01-16 NOTE — NURSING
Notified Dr. Arreaga regarding pt's bg @ 1934 of 126 before dinner. Schedule insulin for mealtime to be started tomorrow morning. Notified MD that pt will receive a 250 mg dose of solumedrol tonight. MD stated to give a one time dose of 5 units of aspart for dinner. Will continue to monitor.

## 2019-01-16 NOTE — PROGRESS NOTES
[facility] CMV Prevention (Kidney Transplant)  Protocol: [id number]  IRB# 2017.512  PI: Nicholas (I.D.)  Site: [id number]     Date of Visit: 15/TIM/2019     [id number] (Re-screen)     Re-consent process:  Prior to any study related procedures performed, potential patient approached by PI, Dr. Peterson, in patient room to discuss the [id number] clinical trial on . PI, Dr. Peterson, reviewed the Main IRB approved consent with potential participant in private patient room with CRC Byron Cardozo and Bernardino-JUANA Sands present, and provided all related study details. Main IRB approved consent was reviewed with potential participant in private patient room. Discussion included study requirements, risks, procedures, visits, treatment, etc. Subject given ample time to ask questions, address any concerns, understands participation is voluntary, can withdraw at any time, and expressed full understanding of all study requirements. Main consent signed by subject followed immediately by PI, Dr. Peterson. Patient did not sign optional PK sampling. Patient signed consent for specimen for future use with PI, Dr. Peterson. Both Consents were signed on 15/TIM/19 at 09:45 AM.     Original ICFs placed in study binder and copy of signed ICF given to patient.     Screening   Following the consenting process after the ICF was signed by the subject and consenter, screening procedures including physical exam were performed by Sharri Sands. Labs were drawn for central processing.

## 2019-01-16 NOTE — PLAN OF CARE
Problem: Adult Inpatient Plan of Care  Goal: Plan of Care Review  Andra Newell is a 58 y.o. female admitted with a medical diagnosis of Status post living-donor kidney transplantation.  She presents with the following impairments/functional limitations:  weakness, impaired endurance, gait instability, impaired functional mobilty, pain, impaired balance, decreased lower extremity function. Pt in good spirits and actively participated in evaluation activities without increase in pain from baseline. Pt lives in a 1 story home with her significant other who has taken off of work for 4 weeks to assist with care upon d/c from hospital. PTA, pt required Hussein from significant other for ADLs and household chores, ambulated independently for household distances, and used a power scooter for community distances due to fatigue and SOB. Pt is not currently working, is still driving, and reports taking frequent rest breaks throughout the day. Upon evaluation, pt was SBA for bed mobility and CGA for transfers and ambulation with RW. Pt required min VCs for hand placement during sit to/from stand and was able to ambulate 60ft with RW before fatigue with decreased gait speed. Pt would benefit from acute skilled PT services at this time to improve functional mobility, endurance, and ambulation to return to her PLOF for a safe d/c home.     Erika Sanderson, SPT  1/16/2019

## 2019-01-16 NOTE — PROGRESS NOTES
"[facility]  Endocrinology  Progress Note    Admit Date: 1/14/2019     Reason for Consult: Management of T2DM, Hyperglycemia     Surgical Procedure and Date: OKTx 1/14/19    Diabetes diagnosis year: 2005    Home Diabetes Medications:     Basaglar 80 units BID    How often checking glucose at home? "only when I am feeling bad."    BG readings on regimen: 200's (when she is feeling bad)  Hypoglycemia on the regimen?  Unknown  Missed doses on regimen?  Misses nightly dose around 2 x month.     Diabetes Complications include:     Hyperglycemia, Hypoglycemia unawareness and Diabetic peripheral neuropathy     Complicating diabetes co morbidities:   ESRD, Thyroid disease, Obesity       HPI:   Patient is a 58 y.o. female with a diagnosis of ESRD, DM2, Thyroid Disease, and Obesity. She is s/p OKTx 1/14/19. Endocrinology consulted for management of hyperglycemia/DM. Patient's PCP (in [place]) manages her DM.     Lab Results   Component Value Date    HGBA1C 6.6 (H) 01/09/2019       Interval HPI:   Overnight events:        Remains in TSU. NAEON. BG is within goal on hospital MDI regimen. Solumedrol 125 mg IV. Patient continues to have complications with tolerating swallow screening. Diet remains labile.     Eating:   <25%  Nausea: No  Hypoglycemia and intervention: No  Fever: No  TPN and/or TF: No  If yes, type of TF/TPN and rate: None    BP (!) 147/77   Pulse 86   Temp 98.6 °F (37 °C) (Oral)   Resp 19   Ht 5' 6.5" (1.689 m)   Wt 103.9 kg (229 lb 0.9 oz)   LMP 02/28/2012 (Exact Date)   SpO2 95%   Breastfeeding? No   BMI 36.42 kg/m²      Labs Reviewed and Include    Recent Labs   Lab 01/15/19  1517 01/16/19  0500   * 218*   CALCIUM 8.2* 8.7   ALBUMIN 2.9* 3.0*   PROT 6.5  --     139   K 4.5 4.9   CO2 18* 20*   * 113*   BUN 42* 30*   CREATININE 1.4 1.0   ALKPHOS 126  --    ALT 19  --    AST 31  --    BILITOT 0.3  --      Lab Results   Component Value Date    WBC 19.01 (H) " 01/16/2019    HGB 9.3 (L) 01/16/2019    HCT 29.7 (L) 01/16/2019    MCV 96 01/16/2019     (L) 01/16/2019     No results for input(s): TSH, FREET4 in the last 168 hours.  Lab Results   Component Value Date    HGBA1C 6.6 (H) 01/09/2019       Nutritional status:   Body mass index is 36.42 kg/m².  Lab Results   Component Value Date    ALBUMIN 3.0 (L) 01/16/2019    ALBUMIN 2.9 (L) 01/15/2019    ALBUMIN 3.1 (L) 01/15/2019    ALBUMIN 3.1 (L) 01/15/2019     No results found for: PREALBUMIN    Estimated Creatinine Clearance: 73.6 mL/min (based on SCr of 1 mg/dL).    Accu-Checks  Recent Labs     01/14/19  1120 01/14/19  1825 01/14/19  2315 01/15/19  0839 01/15/19  1934 01/15/19  2343 01/16/19  0739   POCTGLUCOSE 74 140* 185* 254* 126* 151* 187*       Current Medications and/or Treatments Impacting Glycemic Control  Immunotherapy:    Immunosuppressants         Stop Route Frequency     tacrolimus capsule 1 mg      -- Oral 2 times daily     mycophenolate capsule 500 mg      -- Oral 2 times daily        Steroids:   Hormones (From admission, onward)    None        Pressors:    Autonomic Drugs (From admission, onward)    Start     Stop Route Frequency Ordered    01/14/19 1229  cisatracurium (NIMBEX) 2 mg/mL injection     Comments:  Created by cabinet override    01/15 0044   01/14/19 1229        Hyperglycemia/Diabetes Medications:   Antihyperglycemics (From admission, onward)    Start     Stop Route Frequency Ordered    01/16/19 0715  insulin aspart U-100 pen 7 Units      -- SubQ 3 times daily with meals 01/15/19 1719    01/15/19 2100  insulin detemir U-100 pen 20 Units      -- SubQ Nightly 01/15/19 1245    01/15/19 0952  insulin aspart U-100 pen 0-5 Units      -- SubQ Before meals & nightly PRN 01/15/19 0853          ASSESSMENT and PLAN    * Status post living-donor kidney transplantation    Managed per primary team  Avoid hypoglycemia         Uncontrolled type 2 diabetes mellitus with stage 5 chronic kidney disease not on  chronic dialysis, with long-term current use of insulin    BG goal 140-180    Start Levemir 20 units HS  Novolog 7 units TIDWM  Low correction scale.   BG monitoring AC/HS.     Discharge Recommendations:   TBD. Expect for patient to continue her home DM regimen.        BMI 36.0-36.9,adult    may contribute to insulin resistance  Body mass index is 36.42 kg/m².         Adverse effect of adrenal cortical steroids, sequela    On standard steroid taper per transplant team; may elevate BG readings         Prophylactic immunotherapy    On standard steroid taper per transplant team; may elevate BG readings             Tre Sykes, NP  Endocrinology  [facility]

## 2019-01-17 DIAGNOSIS — Z94.0 KIDNEY REPLACED BY TRANSPLANT: Primary | ICD-10-CM

## 2019-01-17 PROBLEM — D62 ACUTE BLOOD LOSS ANEMIA: Status: ACTIVE | Noted: 2019-01-17

## 2019-01-17 PROBLEM — E83.39 HYPOPHOSPHATEMIA: Status: ACTIVE | Noted: 2019-01-17

## 2019-01-17 PROBLEM — D69.6 THROMBOCYTOPENIA, UNSPECIFIED: Status: ACTIVE | Noted: 2019-01-17

## 2019-01-17 LAB
ALBUMIN SERPL BCP-MCNC: 2.8 G/DL
ANION GAP SERPL CALC-SCNC: 5 MMOL/L
BASOPHILS # BLD AUTO: 0.02 K/UL
BASOPHILS NFR BLD: 0.1 %
BUN SERPL-MCNC: 26 MG/DL
CALCIUM SERPL-MCNC: 9.4 MG/DL
CHLORIDE SERPL-SCNC: 113 MMOL/L
CLASS I ANTIBODY COMMENTS - LUMINEX: NORMAL
CLASS II ANTIBODY COMMENTS - LUMINEX: NORMAL
CO2 SERPL-SCNC: 26 MMOL/L
CREAT SERPL-MCNC: 1 MG/DL
DIFFERENTIAL METHOD: ABNORMAL
DSA1 TESTING DATE: NORMAL
DSA12 TESTING DATE: NORMAL
DSA2 TESTING DATE: NORMAL
EOSINOPHIL # BLD AUTO: 0 K/UL
EOSINOPHIL NFR BLD: 0 %
ERYTHROCYTE [DISTWIDTH] IN BLOOD BY AUTOMATED COUNT: 12.9 %
EST. GFR  (AFRICAN AMERICAN): >60 ML/MIN/1.73 M^2
EST. GFR  (NON AFRICAN AMERICAN): >60 ML/MIN/1.73 M^2
FERRITIN SERPL-MCNC: 238 NG/ML
FERRITIN SERPL-MCNC: 278 NG/ML
GLUCOSE SERPL-MCNC: 107 MG/DL
HCT VFR BLD AUTO: 28.6 %
HGB BLD-MCNC: 9.1 G/DL
IMM GRANULOCYTES # BLD AUTO: 0.1 K/UL
IMM GRANULOCYTES NFR BLD AUTO: 0.7 %
IRON SERPL-MCNC: 123 UG/DL
IRON SERPL-MCNC: 92 UG/DL
LYMPHOCYTES # BLD AUTO: 0 K/UL
LYMPHOCYTES NFR BLD: 0.1 %
MAGNESIUM SERPL-MCNC: 2.4 MG/DL
MCH RBC QN AUTO: 29.4 PG
MCHC RBC AUTO-ENTMCNC: 31.8 G/DL
MCV RBC AUTO: 93 FL
MONOCYTES # BLD AUTO: 0.4 K/UL
MONOCYTES NFR BLD: 2.6 %
NEUTROPHILS # BLD AUTO: 13.9 K/UL
NEUTROPHILS NFR BLD: 96.5 %
NRBC BLD-RTO: 0 /100 WBC
PHOSPHATE SERPL-MCNC: 2.5 MG/DL
PLATELET # BLD AUTO: 125 K/UL
PMV BLD AUTO: 10.2 FL
POCT GLUCOSE: 110 MG/DL (ref 70–110)
POCT GLUCOSE: 112 MG/DL (ref 70–110)
POCT GLUCOSE: 121 MG/DL (ref 70–110)
POCT GLUCOSE: 97 MG/DL (ref 70–110)
POTASSIUM SERPL-SCNC: 4.4 MMOL/L
RBC # BLD AUTO: 3.09 M/UL
SATURATED IRON: 39 %
SATURATED IRON: 54 %
SERUM COLLECTION DT - LUMINEX CLASS I: NORMAL
SERUM COLLECTION DT - LUMINEX CLASS II: NORMAL
SODIUM SERPL-SCNC: 144 MMOL/L
TACROLIMUS BLD-MCNC: 4.5 NG/ML
TOTAL IRON BINDING CAPACITY: 229 UG/DL
TOTAL IRON BINDING CAPACITY: 237 UG/DL
TRANSFERRIN SERPL-MCNC: 155 MG/DL
TRANSFERRIN SERPL-MCNC: 160 MG/DL
TROPONIN I SERPL DL<=0.01 NG/ML-MCNC: <0.006 NG/ML
WBC # BLD AUTO: 14.44 K/UL

## 2019-01-17 PROCEDURE — 84484 ASSAY OF TROPONIN QUANT: CPT

## 2019-01-17 PROCEDURE — 82728 ASSAY OF FERRITIN: CPT

## 2019-01-17 PROCEDURE — 83735 ASSAY OF MAGNESIUM: CPT

## 2019-01-17 PROCEDURE — 94660 CPAP INITIATION&MGMT: CPT

## 2019-01-17 PROCEDURE — 63600175 PHARM REV CODE 636 W HCPCS: Performed by: NURSE PRACTITIONER

## 2019-01-17 PROCEDURE — 99232 SBSQ HOSP IP/OBS MODERATE 35: CPT | Mod: ,,, | Performed by: NURSE PRACTITIONER

## 2019-01-17 PROCEDURE — 86832 HLA CLASS I HIGH DEFIN QUAL: CPT

## 2019-01-17 PROCEDURE — 83540 ASSAY OF IRON: CPT

## 2019-01-17 PROCEDURE — 86833 HLA CLASS II HIGH DEFIN QUAL: CPT | Mod: 91

## 2019-01-17 PROCEDURE — 99232 PR SUBSEQUENT HOSPITAL CARE,LEVL II: ICD-10-PCS | Mod: ,,, | Performed by: NURSE PRACTITIONER

## 2019-01-17 PROCEDURE — 99233 PR SUBSEQUENT HOSPITAL CARE,LEVL III: ICD-10-PCS | Mod: ,,, | Performed by: PHYSICIAN ASSISTANT

## 2019-01-17 PROCEDURE — 63600175 PHARM REV CODE 636 W HCPCS: Performed by: SURGERY

## 2019-01-17 PROCEDURE — 93005 ELECTROCARDIOGRAM TRACING: CPT

## 2019-01-17 PROCEDURE — 94761 N-INVAS EAR/PLS OXIMETRY MLT: CPT

## 2019-01-17 PROCEDURE — 93010 ELECTROCARDIOGRAM REPORT: CPT | Mod: ,,, | Performed by: INTERNAL MEDICINE

## 2019-01-17 PROCEDURE — 99233 SBSQ HOSP IP/OBS HIGH 50: CPT | Mod: ,,, | Performed by: PHYSICIAN ASSISTANT

## 2019-01-17 PROCEDURE — 25000003 PHARM REV CODE 250: Performed by: PHYSICIAN ASSISTANT

## 2019-01-17 PROCEDURE — 25000003 PHARM REV CODE 250: Performed by: NURSE PRACTITIONER

## 2019-01-17 PROCEDURE — 82728 ASSAY OF FERRITIN: CPT | Mod: 91

## 2019-01-17 PROCEDURE — 25000003 PHARM REV CODE 250: Performed by: SURGERY

## 2019-01-17 PROCEDURE — 93010 EKG 12-LEAD: ICD-10-PCS | Mod: ,,, | Performed by: INTERNAL MEDICINE

## 2019-01-17 PROCEDURE — 83540 ASSAY OF IRON: CPT | Mod: 91

## 2019-01-17 PROCEDURE — 63600175 PHARM REV CODE 636 W HCPCS: Performed by: PHYSICIAN ASSISTANT

## 2019-01-17 PROCEDURE — 85025 COMPLETE CBC W/AUTO DIFF WBC: CPT

## 2019-01-17 PROCEDURE — 80197 ASSAY OF TACROLIMUS: CPT

## 2019-01-17 PROCEDURE — 86977 RBC SERUM PRETX INCUBJ/INHIB: CPT | Mod: 91

## 2019-01-17 PROCEDURE — 80069 RENAL FUNCTION PANEL: CPT

## 2019-01-17 PROCEDURE — 20600001 HC STEP DOWN PRIVATE ROOM

## 2019-01-17 RX ORDER — FAMOTIDINE 20 MG/1
20 TABLET, FILM COATED ORAL
Status: DISCONTINUED | OUTPATIENT
Start: 2019-01-17 | End: 2019-01-18 | Stop reason: HOSPADM

## 2019-01-17 RX ORDER — POLYETHYLENE GLYCOL 3350 17 G/17G
17 POWDER, FOR SOLUTION ORAL DAILY
Status: DISCONTINUED | OUTPATIENT
Start: 2019-01-17 | End: 2019-01-18 | Stop reason: HOSPADM

## 2019-01-17 RX ORDER — TACROLIMUS 1 MG/1
2 CAPSULE ORAL ONCE
Status: COMPLETED | OUTPATIENT
Start: 2019-01-17 | End: 2019-01-17

## 2019-01-17 RX ORDER — OXYCODONE HYDROCHLORIDE 10 MG/1
5-10 TABLET ORAL EVERY 4 HOURS PRN
Qty: 40 TABLET | Refills: 0 | Status: ON HOLD | OUTPATIENT
Start: 2019-01-17 | End: 2019-02-03

## 2019-01-17 RX ORDER — TACROLIMUS 1 MG/1
4 CAPSULE ORAL EVERY 12 HOURS
Qty: 240 CAPSULE | Refills: 11 | Status: SHIPPED | OUTPATIENT
Start: 2019-01-17 | End: 2019-01-18

## 2019-01-17 RX ORDER — FAMOTIDINE 20 MG/1
20 TABLET, FILM COATED ORAL 2 TIMES DAILY
Qty: 60 TABLET | Refills: 11 | Status: ON HOLD | OUTPATIENT
Start: 2019-01-17 | End: 2019-02-13 | Stop reason: HOSPADM

## 2019-01-17 RX ORDER — TACROLIMUS 1 MG/1
4 CAPSULE ORAL 2 TIMES DAILY
Status: DISCONTINUED | OUTPATIENT
Start: 2019-01-17 | End: 2019-01-18

## 2019-01-17 RX ORDER — SODIUM BICARBONATE 650 MG/1
650 TABLET ORAL 2 TIMES DAILY
Status: DISCONTINUED | OUTPATIENT
Start: 2019-01-17 | End: 2019-01-18 | Stop reason: HOSPADM

## 2019-01-17 RX ORDER — BISACODYL 10 MG
10 SUPPOSITORY, RECTAL RECTAL DAILY PRN
Status: DISCONTINUED | OUTPATIENT
Start: 2019-01-17 | End: 2019-01-18 | Stop reason: HOSPADM

## 2019-01-17 RX ORDER — FAMOTIDINE 20 MG/1
20 TABLET, FILM COATED ORAL 2 TIMES DAILY
Status: DISCONTINUED | OUTPATIENT
Start: 2019-01-17 | End: 2019-01-17

## 2019-01-17 RX ORDER — SODIUM BICARBONATE 650 MG/1
650 TABLET ORAL 2 TIMES DAILY
Qty: 60 TABLET | Refills: 11 | Status: SHIPPED | OUTPATIENT
Start: 2019-01-17 | End: 2019-01-21

## 2019-01-17 RX ADMIN — TACROLIMUS 4 MG: 1 CAPSULE ORAL at 06:01

## 2019-01-17 RX ADMIN — FAMOTIDINE 20 MG: 20 TABLET ORAL at 06:01

## 2019-01-17 RX ADMIN — HEPARIN SODIUM 5000 UNITS: 5000 INJECTION, SOLUTION INTRAVENOUS; SUBCUTANEOUS at 08:01

## 2019-01-17 RX ADMIN — THERA TABS 1 TABLET: TAB at 07:01

## 2019-01-17 RX ADMIN — HEPARIN SODIUM 5000 UNITS: 5000 INJECTION, SOLUTION INTRAVENOUS; SUBCUTANEOUS at 03:01

## 2019-01-17 RX ADMIN — LEVOTHYROXINE SODIUM 75 MCG: 25 TABLET ORAL at 05:01

## 2019-01-17 RX ADMIN — ALUMINUM HYDROXIDE, MAGNESIUM HYDROXIDE, AND SIMETHICONE 30 ML: 200; 200; 20 SUSPENSION ORAL at 09:01

## 2019-01-17 RX ADMIN — POLYETHYLENE GLYCOL 3350 17 G: 17 POWDER, FOR SOLUTION ORAL at 10:01

## 2019-01-17 RX ADMIN — INSULIN ASPART 7 UNITS: 100 INJECTION, SOLUTION INTRAVENOUS; SUBCUTANEOUS at 07:01

## 2019-01-17 RX ADMIN — DOCUSATE SODIUM 100 MG: 100 CAPSULE, LIQUID FILLED ORAL at 03:01

## 2019-01-17 RX ADMIN — DIBASIC SODIUM PHOSPHATE, MONOBASIC POTASSIUM PHOSPHATE AND MONOBASIC SODIUM PHOSPHATE 2 TABLET: 852; 155; 130 TABLET ORAL at 08:01

## 2019-01-17 RX ADMIN — VILAZODONE HYDROCHLORIDE 40 MG: 10 TABLET ORAL at 07:01

## 2019-01-17 RX ADMIN — BISACODYL 10 MG: 10 SUPPOSITORY RECTAL at 10:01

## 2019-01-17 RX ADMIN — TACROLIMUS 2 MG: 1 CAPSULE ORAL at 07:01

## 2019-01-17 RX ADMIN — NYSTATIN 500000 UNITS: 500000 SUSPENSION ORAL at 06:01

## 2019-01-17 RX ADMIN — DOCUSATE SODIUM 100 MG: 100 CAPSULE, LIQUID FILLED ORAL at 08:01

## 2019-01-17 RX ADMIN — DOCUSATE SODIUM 100 MG: 100 CAPSULE, LIQUID FILLED ORAL at 07:01

## 2019-01-17 RX ADMIN — MUPIROCIN 1 G: 20 OINTMENT TOPICAL at 09:01

## 2019-01-17 RX ADMIN — SODIUM BICARBONATE 650 MG TABLET 650 MG: at 08:01

## 2019-01-17 RX ADMIN — HEPARIN SODIUM 5000 UNITS: 5000 INJECTION, SOLUTION INTRAVENOUS; SUBCUTANEOUS at 05:01

## 2019-01-17 RX ADMIN — BISACODYL 10 MG: 5 TABLET, COATED ORAL at 08:01

## 2019-01-17 RX ADMIN — ONDANSETRON 8 MG: 4 TABLET, FILM COATED ORAL at 12:01

## 2019-01-17 RX ADMIN — MYCOPHENOLATE MOFETIL 500 MG: 250 CAPSULE ORAL at 08:01

## 2019-01-17 RX ADMIN — NYSTATIN 500000 UNITS: 500000 SUSPENSION ORAL at 07:01

## 2019-01-17 RX ADMIN — SULFAMETHOXAZOLE AND TRIMETHOPRIM 1 TABLET: 400; 80 TABLET ORAL at 07:01

## 2019-01-17 RX ADMIN — DIBASIC SODIUM PHOSPHATE, MONOBASIC POTASSIUM PHOSPHATE AND MONOBASIC SODIUM PHOSPHATE 2 TABLET: 852; 155; 130 TABLET ORAL at 10:01

## 2019-01-17 RX ADMIN — MYCOPHENOLATE MOFETIL 500 MG: 250 CAPSULE ORAL at 07:01

## 2019-01-17 RX ADMIN — NYSTATIN 500000 UNITS: 500000 SUSPENSION ORAL at 03:01

## 2019-01-17 RX ADMIN — MUPIROCIN 1 G: 20 OINTMENT TOPICAL at 08:01

## 2019-01-17 RX ADMIN — TACROLIMUS 2 MG: 1 CAPSULE ORAL at 10:01

## 2019-01-17 RX ADMIN — NYSTATIN 500000 UNITS: 500000 SUSPENSION ORAL at 08:01

## 2019-01-17 RX ADMIN — SODIUM BICARBONATE 650 MG TABLET 1300 MG: at 07:01

## 2019-01-17 RX ADMIN — FAMOTIDINE 20 MG: 20 TABLET ORAL at 10:01

## 2019-01-17 NOTE — ASSESSMENT & PLAN NOTE
BG goal 140-180    Decrease Levemir 16 units HS  Decrease Novolog 5 units TIDWM  Low correction scale.   BG monitoring AC/HS.     Discharge Recommendations: TBD.

## 2019-01-17 NOTE — PLAN OF CARE
Problem: Adult Inpatient Plan of Care  Goal: Plan of Care Review  Outcome: Ongoing (interventions implemented as appropriate)  Pt is AAOx4, stand by assist, and VSS. Pt denies any pain medication for incisional pain. Incision cleansed with iodine swabs. VISI and telemetry monitoring in place. Blood glucose monitoring performed and treated as ordered. PRN suppository administered-BM resulted. Pt's significant other pulled 100% self meds. Heparin injection given. Pt's bed in lowest position, call bell within reach, and nonskid socks on. Will continue to monitor.

## 2019-01-17 NOTE — PROGRESS NOTES
"[facility]  Endocrinology  Progress Note    Admit Date: 2019     Reason for Consult: Management of T2DM, Hyperglycemia     Surgical Procedure and Date: OKTx 19    Diabetes diagnosis year:     Home Diabetes Medications:     Basaglar 80 units BID    How often checking glucose at home? "only when I am feeling bad."    BG readings on regimen: 200's (when she is feeling bad)  Hypoglycemia on the regimen?  Unknown  Missed doses on regimen?  Misses nightly dose around 2 x month.     Diabetes Complications include:     Hyperglycemia, Hypoglycemia unawareness and Diabetic peripheral neuropathy     Complicating diabetes co morbidities:   ESRD, Thyroid disease, Obesity       HPI:   Patient is a 58 y.o. female with a diagnosis of ESRD, DM2, Thyroid Disease, and Obesity. She is s/p OKTx 19. Endocrinology consulted for management of hyperglycemia/DM. Patient's PCP (in [place], [place]) manages her DM.     Lab Results   Component Value Date    HGBA1C 6.6 (H) 2019       Interval HPI:   Overnight events: Remains in TSU.  BG above goal yesterday with schedules insulin and correction scale. Solumedrol dose yesterday. BG trending down this AM.   Eatin%  Or less  Nausea: yes   Hypoglycemia and intervention: No  Fever: No  TPN and/or TF: No      BP (!) 142/79   Pulse 76   Temp 98.4 °F (36.9 °C) (Oral)   Resp 12   Ht 5' 6.5" (1.689 m)   Wt 102.5 kg (225 lb 15.5 oz)   LMP 2012 (Exact Date)   SpO2 98%   Breastfeeding? No   BMI 35.93 kg/m²      Labs Reviewed and Include    Recent Labs   Lab 19  0500      CALCIUM 9.4   ALBUMIN 2.8*      K 4.4   CO2 26   *   BUN 26*   CREATININE 1.0     Lab Results   Component Value Date    WBC 14.44 (H) 2019    HGB 9.1 (L) 2019    HCT 28.6 (L) 2019    MCV 93 2019     (L) 2019     No results for input(s): TSH, FREET4 in the last 168 hours.  Lab Results   Component Value Date    HGBA1C 6.6 " (H) 01/09/2019       Nutritional status:   Body mass index is 35.93 kg/m².  Lab Results   Component Value Date    ALBUMIN 2.8 (L) 01/17/2019    ALBUMIN 3.0 (L) 01/16/2019    ALBUMIN 2.9 (L) 01/15/2019     No results found for: PREALBUMIN    Estimated Creatinine Clearance: 73 mL/min (based on SCr of 1 mg/dL).    Accu-Checks  Recent Labs     01/14/19  1825 01/14/19  2315 01/15/19  0839 01/15/19  1934 01/15/19  2343 01/16/19  0739 01/16/19  1138 01/16/19  1707 01/16/19  2058 01/17/19  0755   POCTGLUCOSE 140* 185* 254* 126* 151* 187* 213* 228* 172* 110       Current Medications and/or Treatments Impacting Glycemic Control  Immunotherapy:    Immunosuppressants         Stop Route Frequency     tacrolimus capsule 4 mg      -- Oral 2 times daily     mycophenolate capsule 500 mg      -- Oral 2 times daily        Steroids:   Hormones (From admission, onward)    None        Pressors:    Autonomic Drugs (From admission, onward)    Start     Stop Route Frequency Ordered    01/14/19 1229  cisatracurium (NIMBEX) 2 mg/mL injection     Comments:  Created by cabinet override    01/15 0044   01/14/19 1229        Hyperglycemia/Diabetes Medications:   Antihyperglycemics (From admission, onward)    Start     Stop Route Frequency Ordered    01/16/19 0715  insulin aspart U-100 pen 7 Units      -- SubQ 3 times daily with meals 01/15/19 1719    01/15/19 2100  insulin detemir U-100 pen 20 Units      -- SubQ Nightly 01/15/19 1245    01/15/19 0952  insulin aspart U-100 pen 0-5 Units      -- SubQ Before meals & nightly PRN 01/15/19 0853          ASSESSMENT and PLAN    * Status post living-donor kidney transplantation    Managed per primary team  Avoid hypoglycemia         Uncontrolled type 2 diabetes mellitus with stage 5 chronic kidney disease not on chronic dialysis, with long-term current use of insulin    BG goal 140-180    Decrease Levemir 16 units HS  Decrease Novolog 5 units TIDWM  Low correction scale.   BG monitoring AC/HS.      Discharge Recommendations: TBD.        Adverse effect of adrenal cortical steroids, sequela    On standard steroid taper per transplant team; may elevate BG readings         Prophylactic immunotherapy    On standard steroid taper per transplant team; may elevate BG readings         BMI 36.0-36.9,adult    may contribute to insulin resistance  Body mass index is 35.93 kg/m².             Anastacia Dill NP  Endocrinology  [facility]

## 2019-01-17 NOTE — ASSESSMENT & PLAN NOTE
BG goal 140-180    Decrease Levemir 12 units HS  Decrease Novolog 4 units TIDWM  Low correction scale.   BG monitoring AC/HS.     Discharge Recommendations: basaglar 12 units HS. Januvia 100 mg daily; no history of pancreatitis or medullary thyroid ca (personal or family).

## 2019-01-17 NOTE — ASSESSMENT & PLAN NOTE
- ESRD 2/2 DM II and HTN, pre-HD  - s/p LURT 1/14/16 2/2 DMII (Campath ind, CMV D+/R-, WIT 58 min)  - Cr trending down, 1.0 again today, excellent uop  - Grace removed this AM. Voiding without difficulty.  - Poorly tolerating diet due to indigestion, continue pepsid (increase to BID before meals) and TUMS/maalox PRN    - EGF 1/17 NSR, troponin negative.  - pain well controlled. (+) flatus, (-) BM, continue bowel regimen + miralax & suppository today

## 2019-01-17 NOTE — SUBJECTIVE & OBJECTIVE
Subjective:   History of Present Illness:  Andra Newell is a 40 y/o F with ESRD 2/2 DM II started on HD 4/13/18 on Monday/Friday schedule, stopped after 4 months (September 2018) due to stable renal function. Patient essentially pre-dialysis. She is now s/p LURT 1/14/19 (Campath induction, CMV D+/R-, TIT 58 min). Of note, pre-txp, patient used scooter at home and for long distances due to multiple knee replacements and achilles tendon injury. Surgery uncomplicated.         Past Medical, Surgical, Family, and Social History:   Unchanged from H&P.    Scheduled Meds:   bisacodyl  10 mg Oral QHS    docusate sodium  100 mg Oral TID    ergocalciferol  50,000 Units Oral Q7 Days    famotidine  20 mg Oral BID AC    heparin (porcine)  5,000 Units Subcutaneous Q8H    insulin aspart U-100  7 Units Subcutaneous TIDWM    insulin detemir U-100  16 Units Subcutaneous QHS    k phos di & mono-sod phos mono  2 tablet Oral BID    levothyroxine  75 mcg Oral Before breakfast    multivitamin  1 tablet Oral Daily    mupirocin  1 g Nasal BID    mycophenolate  500 mg Oral BID    nystatin  500,000 Units Mouth/Throat QID (PC + HS)    polyethylene glycol  17 g Oral Daily    sodium bicarbonate  650 mg Oral BID    sulfamethoxazole-trimethoprim 400-80mg  1 tablet Oral Daily    tacrolimus  4 mg Oral BID    [START ON 1/25/2019] valGANciclovir  450 mg Oral Daily    vilazodone  40 mg Oral Daily     Continuous Infusions:   glucagon (human recombinant)       PRN Meds:acetaminophen, aluminum-magnesium hydroxide-simethicone, bisacodyl, calcium carbonate, dextrose 50%, dextrose 50%, diphenhydrAMINE, glucagon (human recombinant), glucose, glucose, glucose, insulin aspart U-100, naloxone, ondansetron, oxyCODONE, oxyCODONE, sodium chloride 0.9%    Intake/Output - Last 3 Shifts       01/15 0700 - 01/16 0659 01/16 0700 - 01/17 0659 01/17 0700 - 01/18 0659    P.O. 2060 1240 510    I.V. (mL/kg) 1906.2 (18.3) 0 (0)     Other 0 0     IV  "Piggyback 100      Total Intake(mL/kg) 4066.2 (39.1) 1240 (12.1) 510 (5)    Urine (mL/kg/hr) 3375 (1.4) 3100 (1.3) 630 (1)    Emesis/NG output 0 0 0    Other 0 0     Stool 0 0 0    Blood 0 0     Total Output 3375 3100 630    Net +691.2 -1860 -120           Urine Occurrence 1 x 0 x     Stool Occurrence  0 x 1 x    Emesis Occurrence  0 x 1 x           Review of Systems   Constitutional: Negative for chills and fever.   HENT: Positive for sore throat. Negative for congestion and trouble swallowing.    Respiratory: Negative for cough, shortness of breath and wheezing.    Cardiovascular: Positive for chest pain. Negative for palpitations and leg swelling.   Gastrointestinal: Positive for abdominal pain. Negative for abdominal distention, nausea and vomiting.        + acid reflux   Genitourinary: Negative for decreased urine volume, dysuria and hematuria.   Musculoskeletal: Positive for gait problem (chronic due to multiple knee surgery and achilles tendon injury). Negative for neck pain and neck stiffness.   Skin: Positive for wound. Negative for rash.   Allergic/Immunologic: Positive for immunocompromised state.   Neurological: Negative for tremors, syncope and light-headedness.   Psychiatric/Behavioral: Negative for agitation, behavioral problems and confusion.      Objective:     Vital Signs (Most Recent):  Temp: 97.9 °F (36.6 °C) (01/17/19 1155)  Pulse: 72 (01/17/19 1130)  Resp: 13 (01/17/19 1130)  BP: (!) 145/80 (01/17/19 1130)  SpO2: 97 % (01/17/19 1130) Vital Signs (24h Range):  Temp:  [97.9 °F (36.6 °C)-98.7 °F (37.1 °C)] 97.9 °F (36.6 °C)  Pulse:  [65-84] 72  Resp:  [12-17] 13  SpO2:  [95 %-98 %] 97 %  BP: (138-160)/(66-91) 145/80     Weight: 102.5 kg (225 lb 15.5 oz)  Height: 5' 6.5" (168.9 cm)  Body mass index is 35.93 kg/m².    Physical Exam   Constitutional: She is oriented to person, place, and time. She appears well-developed. She is cooperative.   HENT:   Head: Normocephalic and atraumatic. "   Mouth/Throat: Oropharynx is clear and moist.   Eyes: EOM are normal. No scleral icterus.   Neck: Normal range of motion. Neck supple. No JVD present.   Cardiovascular: Normal rate, regular rhythm and normal heart sounds.   Pulmonary/Chest: Effort normal and breath sounds normal. No respiratory distress. She has no wheezes. She has no rales.   Abdominal: Soft. Bowel sounds are normal. She exhibits distension. There is tenderness. There is no guarding.   RLQ incision intact with staples - minimal serosanguinous drainage inferior portion   Musculoskeletal: She exhibits no edema.   Neurological: She is alert and oriented to person, place, and time.   Skin: Skin is warm and dry.   Psychiatric: She has a normal mood and affect. Her behavior is normal. Thought content normal.   Nursing note and vitals reviewed.      Laboratory:  CBC:   Recent Labs   Lab 01/15/19  1517 01/16/19  0500 01/17/19  0500   WBC 24.93* 19.01* 14.44*   RBC 3.14* 3.10* 3.09*   HGB 9.5* 9.3* 9.1*   HCT 29.5* 29.7* 28.6*   * 122* 125*   MCV 94 96 93   MCH 30.3 30.0 29.4   MCHC 32.2 31.3* 31.8*     CMP:   Recent Labs   Lab 01/14/19  0904  01/15/19  0600 01/15/19  1517 01/16/19  0500 01/17/19  0500   GLU 88  88   < > 292* 197* 218* 107   CALCIUM 9.9  9.9   < > 8.3* 8.2* 8.7 9.4   ALBUMIN 3.8  3.8   < > 3.1*  3.1* 2.9* 3.0* 2.8*   PROT 8.0  --  6.6 6.5  --   --      144   < > 142 139 139 144   K 5.6*  5.6*   < > 5.1 4.5 4.9 4.4   CO2 20*  20*   < > 17* 18* 20* 26   *  116*   < > 117* 114* 113* 113*   BUN 82*  82*   < > 57* 42* 30* 26*   CREATININE 3.7*  3.7*   < > 2.3* 1.4 1.0 1.0   ALKPHOS 192*  --  142* 126  --   --    ALT 22  --  23 19  --   --    AST 17  --  36 31  --   --     < > = values in this interval not displayed.     Coagulation:   Recent Labs   Lab 01/14/19  0904   APTT 25.2     Labs within the past 24 hours have been reviewed.    Diagnostic Results:  Recent/pertinent diagnostic studies reviewed.

## 2019-01-17 NOTE — PROGRESS NOTES
"[facility]  Kidney Transplant  Progress Note      Reason for Follow-up: Reassessment of Kidney Transplant - 1/14/2019  (#1) recipient and management of immunosuppression.    ORGAN: LEFT KIDNEY    Donor Type: Living    Donor CMV Status:   Donor CMV Status:   Donor HBcAB:     Donor HCV Status:     Donor HBV DERRICK: Organ record is missing.  Donor HCV DERRICK: Organ record is missing.      Subjective:   History of Present Illness:  Andra Newell is a 40 y/o F with ESRD 2/2 DM II started on HD 4/13/18 on Monday/Friday schedule, stopped after 4 months (September 2018) due to stable renal function. Patient essentially pre-dialysis. She is now s/p LURT 1/14/19 (Campath induction, CMV D+/R-, TIT 58 min). Of note, pre-txp, patient used scooter at home and for long distances due to multiple knee replacements and achilles tendon injury. Surgery uncomplicated.     Interval History:   Pt c/o constant indigestion "burning chest pain" overnight that started after she ate mac & cheese for dinner last night. EKG this AM NSR. Troponin negative. Pain unrelieved by tums last night, but somewhat relieved by Maalox this AM. Increased Pepsid to twice daily before meals. Grace removed this AM. Voiding without difficulty. Cr stable at 1.0 , 3.1 L UOP past 24h. Only 1.2 L PO intake documented past 24h, but pt reports drinking 2-3 L water yesterday. Incisional pain well controlled. (+) flatus, (-) BM, continue bowel regimen. Added miralax and suppository. Ambulated in [place] with PT/OT yesterday, scooter brought to bedside. Plan for d/c with  PT/OT tomorrow, pending improvement in indigestion.     Past Medical, Surgical, Family, and Social History:   Unchanged from H&P.    Scheduled Meds:   bisacodyl  10 mg Oral QHS    docusate sodium  100 mg Oral TID    ergocalciferol  50,000 Units Oral Q7 Days    famotidine  20 mg Oral BID AC    heparin (porcine)  5,000 Units Subcutaneous Q8H    insulin aspart U-100  7 Units " Subcutaneous TIDWM    insulin detemir U-100  16 Units Subcutaneous QHS    k phos di & mono-sod phos mono  2 tablet Oral BID    levothyroxine  75 mcg Oral Before breakfast    multivitamin  1 tablet Oral Daily    mupirocin  1 g Nasal BID    mycophenolate  500 mg Oral BID    nystatin  500,000 Units Mouth/Throat QID (PC + HS)    polyethylene glycol  17 g Oral Daily    sodium bicarbonate  650 mg Oral BID    sulfamethoxazole-trimethoprim 400-80mg  1 tablet Oral Daily    tacrolimus  4 mg Oral BID    [START ON 1/25/2019] valGANciclovir  450 mg Oral Daily    vilazodone  40 mg Oral Daily     Continuous Infusions:   glucagon (human recombinant)       PRN Meds:acetaminophen, aluminum-magnesium hydroxide-simethicone, bisacodyl, calcium carbonate, dextrose 50%, dextrose 50%, diphenhydrAMINE, glucagon (human recombinant), glucose, glucose, glucose, insulin aspart U-100, naloxone, ondansetron, oxyCODONE, oxyCODONE, sodium chloride 0.9%    Intake/Output - Last 3 Shifts       01/15 0700 - 01/16 0659 01/16 0700 - 01/17 0659 01/17 0700 - 01/18 0659    P.O. 2060 1240 510    I.V. (mL/kg) 1906.2 (18.3) 0 (0)     Other 0 0     IV Piggyback 100      Total Intake(mL/kg) 4066.2 (39.1) 1240 (12.1) 510 (5)    Urine (mL/kg/hr) 3375 (1.4) 3100 (1.3) 630 (1)    Emesis/NG output 0 0 0    Other 0 0     Stool 0 0 0    Blood 0 0     Total Output 3375 3100 630    Net +691.2 -1860 -120           Urine Occurrence 1 x 0 x     Stool Occurrence  0 x 1 x    Emesis Occurrence  0 x 1 x           Review of Systems   Constitutional: Negative for chills and fever.   HENT: Positive for sore throat. Negative for congestion and trouble swallowing.    Respiratory: Negative for cough, shortness of breath and wheezing.    Cardiovascular: Positive for chest pain. Negative for palpitations and leg swelling.   Gastrointestinal: Positive for abdominal pain. Negative for abdominal distention, nausea and vomiting.        + acid reflux   Genitourinary: Negative  "for decreased urine volume, dysuria and hematuria.   Musculoskeletal: Positive for gait problem (chronic due to multiple knee surgery and achilles tendon injury). Negative for neck pain and neck stiffness.   Skin: Positive for wound. Negative for rash.   Allergic/Immunologic: Positive for immunocompromised state.   Neurological: Negative for tremors, syncope and light-headedness.   Psychiatric/Behavioral: Negative for agitation, behavioral problems and confusion.      Objective:     Vital Signs (Most Recent):  Temp: 97.9 °F (36.6 °C) (01/17/19 1155)  Pulse: 72 (01/17/19 1130)  Resp: 13 (01/17/19 1130)  BP: (!) 145/80 (01/17/19 1130)  SpO2: 97 % (01/17/19 1130) Vital Signs (24h Range):  Temp:  [97.9 °F (36.6 °C)-98.7 °F (37.1 °C)] 97.9 °F (36.6 °C)  Pulse:  [65-84] 72  Resp:  [12-17] 13  SpO2:  [95 %-98 %] 97 %  BP: (138-160)/(66-91) 145/80     Weight: 102.5 kg (225 lb 15.5 oz)  Height: 5' 6.5" (168.9 cm)  Body mass index is 35.93 kg/m².    Physical Exam   Constitutional: She is oriented to person, place, and time. She appears well-developed. She is cooperative.   HENT:   Head: Normocephalic and atraumatic.   Mouth/Throat: Oropharynx is clear and moist.   Eyes: EOM are normal. No scleral icterus.   Neck: Normal range of motion. Neck supple. No JVD present.   Cardiovascular: Normal rate, regular rhythm and normal heart sounds.   Pulmonary/Chest: Effort normal and breath sounds normal. No respiratory distress. She has no wheezes. She has no rales.   Abdominal: Soft. Bowel sounds are normal. She exhibits distension. There is tenderness. There is no guarding.   RLQ incision intact with staples - minimal serosanguinous drainage inferior portion   Musculoskeletal: She exhibits no edema.   Neurological: She is alert and oriented to person, place, and time.   Skin: Skin is warm and dry.   Psychiatric: She has a normal mood and affect. Her behavior is normal. Thought content normal.   Nursing note and vitals " reviewed.      Laboratory:  CBC:   Recent Labs   Lab 01/15/19  1517 01/16/19  0500 01/17/19  0500   WBC 24.93* 19.01* 14.44*   RBC 3.14* 3.10* 3.09*   HGB 9.5* 9.3* 9.1*   HCT 29.5* 29.7* 28.6*   * 122* 125*   MCV 94 96 93   MCH 30.3 30.0 29.4   MCHC 32.2 31.3* 31.8*     CMP:   Recent Labs   Lab 01/14/19  0904  01/15/19  0600 01/15/19  1517 01/16/19  0500 01/17/19  0500   GLU 88  88   < > 292* 197* 218* 107   CALCIUM 9.9  9.9   < > 8.3* 8.2* 8.7 9.4   ALBUMIN 3.8  3.8   < > 3.1*  3.1* 2.9* 3.0* 2.8*   PROT 8.0  --  6.6 6.5  --   --      144   < > 142 139 139 144   K 5.6*  5.6*   < > 5.1 4.5 4.9 4.4   CO2 20*  20*   < > 17* 18* 20* 26   *  116*   < > 117* 114* 113* 113*   BUN 82*  82*   < > 57* 42* 30* 26*   CREATININE 3.7*  3.7*   < > 2.3* 1.4 1.0 1.0   ALKPHOS 192*  --  142* 126  --   --    ALT 22  --  23 19  --   --    AST 17  --  36 31  --   --     < > = values in this interval not displayed.     Coagulation:   Recent Labs   Lab 01/14/19  0904   APTT 25.2     Labs within the past 24 hours have been reviewed.    Diagnostic Results:  Recent/pertinent diagnostic studies reviewed.    Assessment/Plan:     * Status post living-donor kidney transplantation    - ESRD 2/2 DM II and HTN, pre-HD  - s/p LURT 1/14/16 2/2 DMII (Campath ind, CMV D+/R-, WIT 58 min)  - Cr trending down, 1.0 again today, excellent uop  - Grace removed this AM. Voiding without difficulty.  - Poorly tolerating diet due to indigestion, continue pepsid (increase to BID before meals) and TUMS/maalox PRN    - EGF 1/17 NSR, troponin negative.  - pain well controlled. (+) flatus, (-) BM, continue bowel regimen + miralax & suppository today     Prophylactic immunotherapy    - Continue Prograf, Cellcept, steroids per Campath induction protocol.  - Monitor Prograf trough level daily and adjust dose as needed to achieve therapeutic level.     At risk for opportunistic infections    - Continue bactrim for 1 year for PCP  "prophylaxis.  - Continue nystatin for fungal prophylaxis.  - Valcyte to start on discharge or POD#10 (CMV D+/R-)  - ID saw pt re: enrolled in Valcyte drug trial     Thrombocytopenia, unspecified    - Plt decreased, but stable.  - Likely drug induced.  - Continue to monitor.     Acute blood loss anemia    - H/H prior to transplant 11.1/35.8, slowly trending down (9.1/28.6 11/17)  - Iron panel pending   - Continue to monitor.     Hypophosphatemia    - Phos 2.5 1/17  - Start k-phos 500 mg BID       Presence of surgical incision    - Incision BA with staples, small amt serosanguinous drainage at inferior portion.  - Monitor.     Metabolic acidosis    - Started Bicarb 1300 mg BID  - Decreased Bicarb to 650 mg BID 1/17  - Monitor w/ daily labs     Vitamin D deficiency    - Vit D 12 1/14  - Cont Ergo weekly     Long-term use of immunosuppressant medication    - See "prophylactic immunotherapy."     Adverse effect of adrenal cortical steroids, sequela    - Monitor.       BMI 36.0-36.9,adult    - Monitor.       Arthritis    - weakness pre-txp used scooter for long distances due to multiple knee surgeries and achilles tendon injury  - PT/OT eval, recommended home health PT/OT w/ rolling walker (orders placed)     Hypothyroidism    - Continue levothyroxine.  - Monitor.       Depression    - continue Viibryd, patient reports withdrawal symptoms when med held.      Uncontrolled type 2 diabetes mellitus with stage 5 chronic kidney disease not on chronic dialysis, with long-term current use of insulin    - Endocrine consulted, appreciate recs.  - Monitor.         Discharge Planning:  Not stable for discharge at this time (possible d/c tomorrow if indigestion improves)    Lidia Elizabeth PA-C  Kidney Transplant  [facility]  "

## 2019-01-17 NOTE — ASSESSMENT & PLAN NOTE
- Continue bactrim for 1 year for PCP prophylaxis.  - Continue nystatin for fungal prophylaxis.  - Valcyte to start on discharge or POD#10 (CMV D+/R-)  - ID saw pt re: enrolled in Valcyte drug trial

## 2019-01-17 NOTE — PLAN OF CARE
Problem: Adult Inpatient Plan of Care  Goal: Plan of Care Review  Outcome: Ongoing (interventions implemented as appropriate)  AAOx3, afebrile, c/o pain. Pain controlled by prn pain medication. PRN benadryl given with pain medication. BG monitored and tx per orders. Pt with two episodes of a blood nose yesterday. Stopped with tissue. Grace in place during the night- clear yellow urine. Grace removed @0545. Pt understands the importance of measuring urine. Possible D/C today. Self meds 100%. Gauze applied to incision. Pt able to position self independently in bed. Pt in lowest position, side rails up x2, non-skid foot wear in place, call light within reach, pt verbalized understanding to call RN when needed. Hand hygiene practiced per protocol. Will continue to monitor.

## 2019-01-17 NOTE — ASSESSMENT & PLAN NOTE
- weakness pre-txp used scooter for long distances due to multiple knee surgeries and achilles tendon injury  - PT/OT eval, recommended home health PT/OT w/ rolling walker (orders placed)

## 2019-01-17 NOTE — SUBJECTIVE & OBJECTIVE
"Interval HPI:   Overnight events: Remains in TSU.  BG above goal yesterday with schedules insulin and correction scale. Solumedrol dose yesterday. BG trending down this AM.   Eatin%  Or less  Nausea: yes   Hypoglycemia and intervention: No  Fever: No  TPN and/or TF: No      BP (!) 142/79   Pulse 76   Temp 98.4 °F (36.9 °C) (Oral)   Resp 12   Ht 5' 6.5" (1.689 m)   Wt 102.5 kg (225 lb 15.5 oz)   LMP 2012 (Exact Date)   SpO2 98%   Breastfeeding? No   BMI 35.93 kg/m²     Labs Reviewed and Include    Recent Labs   Lab 19  0500      CALCIUM 9.4   ALBUMIN 2.8*      K 4.4   CO2 26   *   BUN 26*   CREATININE 1.0     Lab Results   Component Value Date    WBC 14.44 (H) 2019    HGB 9.1 (L) 2019    HCT 28.6 (L) 2019    MCV 93 2019     (L) 2019     No results for input(s): TSH, FREET4 in the last 168 hours.  Lab Results   Component Value Date    HGBA1C 6.6 (H) 2019       Nutritional status:   Body mass index is 35.93 kg/m².  Lab Results   Component Value Date    ALBUMIN 2.8 (L) 2019    ALBUMIN 3.0 (L) 2019    ALBUMIN 2.9 (L) 01/15/2019     No results found for: PREALBUMIN    Estimated Creatinine Clearance: 73 mL/min (based on SCr of 1 mg/dL).    Accu-Checks  Recent Labs     19  1825 19  2315 01/15/19  0839 01/15/19  1934 01/15/19  2343 19  0739 19  1138 19  1707 19  2058 19  0755   POCTGLUCOSE 140* 185* 254* 126* 151* 187* 213* 228* 172* 110       Current Medications and/or Treatments Impacting Glycemic Control  Immunotherapy:    Immunosuppressants         Stop Route Frequency     tacrolimus capsule 4 mg      -- Oral 2 times daily     mycophenolate capsule 500 mg      -- Oral 2 times daily        Steroids:   Hormones (From admission, onward)    None        Pressors:    Autonomic Drugs (From admission, onward)    Start     Stop Route Frequency Ordered    19 1229  cisatracurium " (NIMBEX) 2 mg/mL injection     Comments:  Created by cabinet override    01/15 0044   01/14/19 1229        Hyperglycemia/Diabetes Medications:   Antihyperglycemics (From admission, onward)    Start     Stop Route Frequency Ordered    01/16/19 0715  insulin aspart U-100 pen 7 Units      -- SubQ 3 times daily with meals 01/15/19 1719    01/15/19 2100  insulin detemir U-100 pen 20 Units      -- SubQ Nightly 01/15/19 1245    01/15/19 0952  insulin aspart U-100 pen 0-5 Units      -- SubQ Before meals & nightly PRN 01/15/19 0853

## 2019-01-17 NOTE — PROGRESS NOTES
SW met with pt and s/o Any for continuity of care and to assess needs. Pt was alert and oriented x4 and reports discomfort due to indigestion. Pt's s/o expressed some distress and SW detected overwhelming feelings. Any reports forgetting to take thyroid medication since pt was transplanted. SW provided acknowledgement, validation, active listening, encouragement and explained caregiver fatigue. Any verbalized understanding and reports she will receive help from her sister this weekend. SW reports pt will check-into the [address] apartments at 10:30am. Pt's nightly rate will be $4.16.     Pt will discharge with DME equipment; walker. Pt already has a scooter. SW confirmed pre-discharge delivery with [facility] at [address] [place] [address] DME.

## 2019-01-17 NOTE — ASSESSMENT & PLAN NOTE
- H/H prior to transplant 11.1/35.8, slowly trending down (9.1/28.6 11/17)  - Iron panel pending   - Continue to monitor.

## 2019-01-18 ENCOUNTER — TELEPHONE (OUTPATIENT)
Dept: ENDOCRINOLOGY | Facility: HOSPITAL | Age: 59
End: 2019-01-18

## 2019-01-18 ENCOUNTER — RESEARCH ENCOUNTER (OUTPATIENT)
Dept: RESEARCH | Facility: CLINIC | Age: 59
End: 2019-01-18
Payer: MEDICARE

## 2019-01-18 VITALS
SYSTOLIC BLOOD PRESSURE: 134 MMHG | BODY MASS INDEX: 35.33 KG/M2 | DIASTOLIC BLOOD PRESSURE: 77 MMHG | HEIGHT: 67 IN | TEMPERATURE: 98 F | OXYGEN SATURATION: 94 % | RESPIRATION RATE: 13 BRPM | WEIGHT: 225.06 LBS | HEART RATE: 76 BPM

## 2019-01-18 DIAGNOSIS — Z94.0 KIDNEY REPLACED BY TRANSPLANT: Primary | ICD-10-CM

## 2019-01-18 DIAGNOSIS — Z00.6 EXAM FOR CLINICAL RESEARCH: Primary | ICD-10-CM

## 2019-01-18 DIAGNOSIS — Z94.0 STATUS POST LIVING-DONOR KIDNEY TRANSPLANTATION: ICD-10-CM

## 2019-01-18 LAB
ALBUMIN SERPL BCP-MCNC: 2.7 G/DL
ANION GAP SERPL CALC-SCNC: 7 MMOL/L
BASOPHILS # BLD AUTO: 0.02 K/UL
BASOPHILS NFR BLD: 0.4 %
BUN SERPL-MCNC: 21 MG/DL
CALCIUM SERPL-MCNC: 8.5 MG/DL
CHLORIDE SERPL-SCNC: 106 MMOL/L
CO2 SERPL-SCNC: 26 MMOL/L
CREAT SERPL-MCNC: 1.1 MG/DL
DIFFERENTIAL METHOD: ABNORMAL
EOSINOPHIL # BLD AUTO: 0 K/UL
EOSINOPHIL NFR BLD: 0.2 %
ERYTHROCYTE [DISTWIDTH] IN BLOOD BY AUTOMATED COUNT: 12.6 %
EST. GFR  (AFRICAN AMERICAN): >60 ML/MIN/1.73 M^2
EST. GFR  (NON AFRICAN AMERICAN): 54.7 ML/MIN/1.73 M^2
GLUCOSE SERPL-MCNC: 114 MG/DL
HCT VFR BLD AUTO: 27.7 %
HGB BLD-MCNC: 8.7 G/DL
IMM GRANULOCYTES # BLD AUTO: 0.08 K/UL
IMM GRANULOCYTES NFR BLD AUTO: 1.5 %
LYMPHOCYTES # BLD AUTO: 0 K/UL
LYMPHOCYTES NFR BLD: 0.2 %
MAGNESIUM SERPL-MCNC: 1.9 MG/DL
MCH RBC QN AUTO: 29.2 PG
MCHC RBC AUTO-ENTMCNC: 31.4 G/DL
MCV RBC AUTO: 93 FL
MONOCYTES # BLD AUTO: 0.1 K/UL
MONOCYTES NFR BLD: 2.6 %
NEUTROPHILS # BLD AUTO: 5 K/UL
NEUTROPHILS NFR BLD: 95.1 %
NRBC BLD-RTO: 0 /100 WBC
PHOSPHATE SERPL-MCNC: 2.7 MG/DL
PLATELET # BLD AUTO: 112 K/UL
PMV BLD AUTO: 10.2 FL
POCT GLUCOSE: 106 MG/DL (ref 70–110)
POCT GLUCOSE: 109 MG/DL (ref 70–110)
POTASSIUM SERPL-SCNC: 4 MMOL/L
RBC # BLD AUTO: 2.98 M/UL
SODIUM SERPL-SCNC: 139 MMOL/L
TACROLIMUS BLD-MCNC: 5.3 NG/ML
WBC # BLD AUTO: 5.29 K/UL

## 2019-01-18 PROCEDURE — 99213 OFFICE O/P EST LOW 20 MIN: CPT | Mod: S$PBB,,, | Performed by: CLINICAL NURSE SPECIALIST

## 2019-01-18 PROCEDURE — 99232 PR SUBSEQUENT HOSPITAL CARE,LEVL II: ICD-10-PCS | Mod: ,,, | Performed by: NURSE PRACTITIONER

## 2019-01-18 PROCEDURE — 80069 RENAL FUNCTION PANEL: CPT

## 2019-01-18 PROCEDURE — 63600175 PHARM REV CODE 636 W HCPCS: Performed by: INTERNAL MEDICINE

## 2019-01-18 PROCEDURE — 97116 GAIT TRAINING THERAPY: CPT

## 2019-01-18 PROCEDURE — 63600175 PHARM REV CODE 636 W HCPCS: Performed by: PHYSICIAN ASSISTANT

## 2019-01-18 PROCEDURE — 99239 PR HOSPITAL DISCHARGE DAY,>30 MIN: ICD-10-PCS | Mod: 24,,, | Performed by: PHYSICIAN ASSISTANT

## 2019-01-18 PROCEDURE — 25000003 PHARM REV CODE 250: Performed by: PHYSICIAN ASSISTANT

## 2019-01-18 PROCEDURE — 99232 SBSQ HOSP IP/OBS MODERATE 35: CPT | Mod: ,,, | Performed by: NURSE PRACTITIONER

## 2019-01-18 PROCEDURE — 99213 PR OFFICE/OUTPT VISIT, EST, LEVL III, 20-29 MIN: ICD-10-PCS | Mod: S$PBB,,, | Performed by: CLINICAL NURSE SPECIALIST

## 2019-01-18 PROCEDURE — 97530 THERAPEUTIC ACTIVITIES: CPT

## 2019-01-18 PROCEDURE — 85025 COMPLETE CBC W/AUTO DIFF WBC: CPT

## 2019-01-18 PROCEDURE — 25000003 PHARM REV CODE 250: Performed by: SURGERY

## 2019-01-18 PROCEDURE — 63600175 PHARM REV CODE 636 W HCPCS: Performed by: SURGERY

## 2019-01-18 PROCEDURE — 99239 HOSP IP/OBS DSCHRG MGMT >30: CPT | Mod: 24,,, | Performed by: PHYSICIAN ASSISTANT

## 2019-01-18 PROCEDURE — 80197 ASSAY OF TACROLIMUS: CPT

## 2019-01-18 PROCEDURE — 83735 ASSAY OF MAGNESIUM: CPT

## 2019-01-18 RX ORDER — TACROLIMUS 1 MG/1
6 CAPSULE ORAL 2 TIMES DAILY
Status: DISCONTINUED | OUTPATIENT
Start: 2019-01-18 | End: 2019-01-18 | Stop reason: HOSPADM

## 2019-01-18 RX ORDER — INSULIN GLARGINE 100 [IU]/ML
12 INJECTION, SOLUTION SUBCUTANEOUS NIGHTLY
Qty: 15 ML | Refills: 0
Start: 2019-01-18 | End: 2019-01-21

## 2019-01-18 RX ORDER — INSULIN ASPART 100 [IU]/ML
5 INJECTION, SOLUTION INTRAVENOUS; SUBCUTANEOUS
Status: DISCONTINUED | OUTPATIENT
Start: 2019-01-18 | End: 2019-01-18

## 2019-01-18 RX ORDER — INSULIN ASPART 100 [IU]/ML
4 INJECTION, SOLUTION INTRAVENOUS; SUBCUTANEOUS
Status: DISCONTINUED | OUTPATIENT
Start: 2019-01-18 | End: 2019-01-18 | Stop reason: HOSPADM

## 2019-01-18 RX ORDER — TACROLIMUS 1 MG/1
2 CAPSULE ORAL ONCE
Status: COMPLETED | OUTPATIENT
Start: 2019-01-18 | End: 2019-01-18

## 2019-01-18 RX ORDER — TACROLIMUS 1 MG/1
6 CAPSULE ORAL EVERY 12 HOURS
Qty: 360 CAPSULE | Refills: 11 | Status: SHIPPED | OUTPATIENT
Start: 2019-01-18 | End: 2019-01-19

## 2019-01-18 RX ADMIN — INSULIN ASPART 4 UNITS: 100 INJECTION, SOLUTION INTRAVENOUS; SUBCUTANEOUS at 12:01

## 2019-01-18 RX ADMIN — SODIUM BICARBONATE 650 MG TABLET 650 MG: at 07:01

## 2019-01-18 RX ADMIN — MYCOPHENOLATE MOFETIL 500 MG: 250 CAPSULE ORAL at 08:01

## 2019-01-18 RX ADMIN — HEPARIN SODIUM 5000 UNITS: 5000 INJECTION, SOLUTION INTRAVENOUS; SUBCUTANEOUS at 02:01

## 2019-01-18 RX ADMIN — NYSTATIN 500000 UNITS: 500000 SUSPENSION ORAL at 08:01

## 2019-01-18 RX ADMIN — TACROLIMUS 2 MG: 1 CAPSULE ORAL at 12:01

## 2019-01-18 RX ADMIN — HEPARIN SODIUM 5000 UNITS: 5000 INJECTION, SOLUTION INTRAVENOUS; SUBCUTANEOUS at 05:01

## 2019-01-18 RX ADMIN — POLYETHYLENE GLYCOL 3350 17 G: 17 POWDER, FOR SOLUTION ORAL at 08:01

## 2019-01-18 RX ADMIN — TACROLIMUS 4 MG: 1 CAPSULE ORAL at 07:01

## 2019-01-18 RX ADMIN — DIPHENHYDRAMINE HYDROCHLORIDE 25 MG: 25 CAPSULE ORAL at 02:01

## 2019-01-18 RX ADMIN — OXYCODONE HYDROCHLORIDE 10 MG: 10 TABLET ORAL at 02:01

## 2019-01-18 RX ADMIN — NYSTATIN 500000 UNITS: 500000 SUSPENSION ORAL at 02:01

## 2019-01-18 RX ADMIN — DIBASIC SODIUM PHOSPHATE, MONOBASIC POTASSIUM PHOSPHATE AND MONOBASIC SODIUM PHOSPHATE 2 TABLET: 852; 155; 130 TABLET ORAL at 07:01

## 2019-01-18 RX ADMIN — INSULIN ASPART 4 UNITS: 100 INJECTION, SOLUTION INTRAVENOUS; SUBCUTANEOUS at 09:01

## 2019-01-18 RX ADMIN — VILAZODONE HYDROCHLORIDE 40 MG: 10 TABLET ORAL at 07:01

## 2019-01-18 RX ADMIN — THERA TABS 1 TABLET: TAB at 07:01

## 2019-01-18 RX ADMIN — LEVOTHYROXINE SODIUM 75 MCG: 25 TABLET ORAL at 05:01

## 2019-01-18 RX ADMIN — SULFAMETHOXAZOLE AND TRIMETHOPRIM 1 TABLET: 400; 80 TABLET ORAL at 08:01

## 2019-01-18 RX ADMIN — DOCUSATE SODIUM 100 MG: 100 CAPSULE, LIQUID FILLED ORAL at 07:01

## 2019-01-18 RX ADMIN — FAMOTIDINE 20 MG: 20 TABLET ORAL at 05:01

## 2019-01-18 NOTE — PROGRESS NOTES
Pt admitted for a LURD kidney transplant. ESRD 2/2 to DM/HTN. Campath induction, CMV D+/R- (CMV MISMATCH) CMV STUDY PARTICIPANT     Post-op course uncomplicated. Cr trended down daily, 1/1 at time of d/c. Good UOP  Grace removed  RLQ incision BA with staples     Pt will stay locally in [facility] run   Labs scheduled over the weekend and Monday. RTC 1/21 to meet with coordinator/pharmD

## 2019-01-18 NOTE — CARE UPDATE
BG goal 140-180. BG below goal with scheduled insulin.     Decrease Levemir 12 units HS  Decrease Novolog 4 units TIDWM  Low correction scale.   BG monitoring AC/HS.       ** Please call Endocrine for any BG related issues **

## 2019-01-18 NOTE — PROGRESS NOTES
Subjective:      Patient ID: Andra Newell is a 58 y.o. female.    Chief Complaint:Research      History of Present Illness  [employer] CMV Prevention (Kidney Transplant)  Protocol: [id number]  IRB# 2017.512  PI: Nicholas GUAN)  Site: 0238  Subject #[id number]     RANDOMIZATION/DAY 1 VISIT:          Date of Visit: 01/18/19  Ms. Andra Newell is a 58 y.o. female with a past medical history of diabetes type 2, HTN, hyperlipidemia, arthritis, GERD, and ESRD who is s/p a living-donor kidney transplant on 1/14/19.     The visit was conducted in patient's hospital room with RANI, Byron Cardozo. Patient is post transplant Day# 4 and meets all Inclusion and none of the Exclusion criteria.    Patient reports she is feeling much better today after having first bowel movement yesterday. She was able to eat her breakfast and no longer having indigestion. States pain well controlled. Denies fever, chills, chest pain, shortness of breath. Physical exam performed. Creatinine 1.1, Creatinine clearance 87.66. Based on study protocol doses of study drugs will be dosed per protocol.     All questions answered and contact information given to patient. Will follow up in clinic in one week.     Review of Systems   Constitution: Negative for chills, fever, weakness, malaise/fatigue and night sweats.   HENT: Negative for congestion, ear pain, hearing loss, hoarse voice, sore throat and tinnitus.    Eyes: Negative for blurred vision, redness and visual disturbance.   Cardiovascular: Negative for chest pain, leg swelling and palpitations.   Respiratory: Negative for cough, hemoptysis, shortness of breath and sputum production.    Hematologic/Lymphatic: Negative for adenopathy. Does not bruise/bleed easily.   Skin: Negative for dry skin, itching, rash and suspicious lesions.   Musculoskeletal: Negative for back pain, joint pain, myalgias and neck pain.   Gastrointestinal: Negative for abdominal pain and nausea.   Genitourinary:  Negative for dysuria, flank pain, frequency, hematuria, hesitancy and urgency.   Neurological: Negative for dizziness, headaches, numbness and paresthesias.   Psychiatric/Behavioral: Negative for depression and memory loss. The patient does not have insomnia and is not nervous/anxious.      Objective:   Physical Exam   Constitutional: She is oriented to person, place, and time. She appears well-developed and well-nourished.   HENT:   Head: Normocephalic and atraumatic.   Right Ear: External ear normal.   Left Ear: External ear normal.   Nose: Nose normal.   Eyes: Conjunctivae and EOM are normal. Right eye exhibits no discharge. Left eye exhibits no discharge.   Neck: Trachea normal and normal range of motion. Neck supple.   Cardiovascular: Normal rate, regular rhythm, normal heart sounds and intact distal pulses.   No murmur heard.  Pulmonary/Chest: Effort normal and breath sounds normal. No respiratory distress. She has no wheezes. She has no rales.   Abdominal: Soft. Bowel sounds are normal. She exhibits no distension. There is no tenderness. There is no guarding.   Musculoskeletal: Normal range of motion.   Neurological: She is alert and oriented to person, place, and time.   Skin: Skin is warm and dry. Capillary refill takes less than 2 seconds.   Right IJ central line. Right lower incision with staples draining small amount serosanguinous fluid   Psychiatric: She has a normal mood and affect. Her behavior is normal. Judgment and thought content normal. Cognition and memory are normal.   Nursing note and vitals reviewed.    Assessment:       1. Exam for clinical research    2. Status post living-donor kidney transplantation          Plan:       1. Study labs drawn  2. Patient randomized and 17 day supply dispensed to patient, first dose given in hospital:   · INV acyclovir/placebo 400mg po q12  · INV Valganciclovir/placebo 900mg po QD  · INV [id number]/placebo 480mg po QD  3. Study related procedures completed   4.  Patient to follow up in ID research clinic in 1 week.

## 2019-01-18 NOTE — SUBJECTIVE & OBJECTIVE
"Interval HPI:   Overnight events: Remains in TSU. Tentative discharge today. BG below goal with scheduled insulin.   Eatin%  Nausea: No  Hypoglycemia and intervention: No  Fever: No  TPN and/or TF: No      /77   Pulse 76   Temp 98.8 °F (37.1 °C) (Oral)   Resp 13   Ht 5' 6.5" (1.689 m)   Wt 102.1 kg (225 lb 1.4 oz)   LMP 2012 (Exact Date)   SpO2 (!) 94%   Breastfeeding? No   BMI 35.79 kg/m²     Labs Reviewed and Include    Recent Labs   Lab 19  0611   *   CALCIUM 8.5*   ALBUMIN 2.7*      K 4.0   CO2 26      BUN 21*   CREATININE 1.1     Lab Results   Component Value Date    WBC 5.29 2019    HGB 8.7 (L) 2019    HCT 27.7 (L) 2019    MCV 93 2019     (L) 2019     No results for input(s): TSH, FREET4 in the last 168 hours.  Lab Results   Component Value Date    HGBA1C 6.6 (H) 2019       Nutritional status:   Body mass index is 35.79 kg/m².  Lab Results   Component Value Date    ALBUMIN 2.7 (L) 2019    ALBUMIN 2.8 (L) 2019    ALBUMIN 3.0 (L) 2019     No results found for: PREALBUMIN    Estimated Creatinine Clearance: 66.2 mL/min (based on SCr of 1.1 mg/dL).    Accu-Checks  Recent Labs     19  0739 19  1138 19  1707 19  2058 19  0755 19  1200 19  1812 19  2201 19  0751 19  1218   POCTGLUCOSE 187* 213* 228* 172* 110 97 112* 121* 106 109       Current Medications and/or Treatments Impacting Glycemic Control  Immunotherapy:    Immunosuppressants         Stop Route Frequency     tacrolimus capsule 6 mg      -- Oral 2 times daily     mycophenolate capsule 500 mg      -- Oral 2 times daily        Steroids:   Hormones (From admission, onward)    None        Pressors:    Autonomic Drugs (From admission, onward)    Start     Stop Route Frequency Ordered    19 1229  cisatracurium (NIMBEX) 2 mg/mL injection     Comments:  Created by cabinet override    01/15 " 0044   01/14/19 1229        Hyperglycemia/Diabetes Medications:   Antihyperglycemics (From admission, onward)    Start     Stop Route Frequency Ordered    01/18/19 2100  insulin detemir U-100 pen 12 Units      -- SubQ Nightly 01/18/19 0753    01/18/19 0800  insulin aspart U-100 pen 4 Units      -- SubQ 3 times daily with meals 01/18/19 0754    01/15/19 0952  insulin aspart U-100 pen 0-5 Units      -- SubQ Before meals & nightly PRN 01/15/19 0853

## 2019-01-18 NOTE — PT/OT/SLP PROGRESS
Physical Therapy Treatment    Patient Name:  Andra Newell   MRN:  [id number]    Recommendations:     Discharge Recommendations:  (HHPT)   Discharge Equipment Recommendations: walker, rolling, tub bench   Barriers to discharge: None    Assessment:     Andra Newell is a 58 y.o. female admitted with a medical diagnosis of Status post living-donor kidney transplantation.  She presents with the following impairments/functional limitations:  impaired endurance, impaired self care skills, impaired sensation, impaired functional mobilty, gait instability, impaired balance, pain .Pt  motivated and cooperative with treatment session. Pt Progressing with PT Intervention. Pt Progressing with improving gait distance. Pt would continue to benefit from skilled PT to address overall functional mobility and goals. Goals remain appropriate.      Rehab Prognosis: Good; patient would benefit from acute skilled PT services to address these deficits and reach maximum level of function.    Recent Surgery: Procedure(s) (LRB):  TRANSPLANT, KIDNEY (N/A) 4 Days Post-Op    Plan:     During this hospitalization, patient to be seen 3 x/week to address the identified rehab impairments via gait training, therapeutic activities, therapeutic exercises, neuromuscular re-education and progress toward the following goals:    · Plan of Care Expires:  02/15/19    Subjective       Patient/Family Comments/goals: I might leave today  Pain/Comfort:  · Pain Rating 1: 2/10  · Location - Orientation 1: generalized  · Location 1: (incision site)  · Pain Addressed 1: Pre-medicate for activity, Reposition  · Pain Rating Post-Intervention 1: 2/10      Objective:     Communicated with RN prior to session.  Patient found all lines intact, call button in reach and nsg notified telemetry  upon PT entry to room.     General Precautions: Standard, fall   Orthopedic Precautions:N/A   Braces: N/A     Functional Mobility:  · Bed Mobility:    ? Scooting: stand  by assistance (in sitting to EOB)   ? Supine to Sit: stand by assistance  · Transfers:    ? Sit to Stand:  stand by assistance with rolling walker from EOB     · Gait:   ? 150ft with RW and SBA  · Pt ambulated with decreased step length, tawanna, gait speed    AM-PAC 6 CLICK MOBILITY  Turning over in bed (including adjusting bedclothes, sheets and blankets)?: 4  Sitting down on and standing up from a chair with arms (e.g., wheelchair, bedside commode, etc.): 4  Moving from lying on back to sitting on the side of the bed?: 4  Moving to and from a bed to a chair (including a wheelchair)?: 3  Need to walk in hospital room?: 3  Climbing 3-5 steps with a railing?: 2  Basic Mobility Total Score: 20       Therapeutic Activities and Exercises:   Discussed/educated patient on progress, safety, importance of OOB mobility for improving outcomes, d/c, PT POC, log rolling to decrease stress to abdominal region   Patient performed therex  seated in bedside chair B LE AROM AP, LAQ, Hip Flexion, Hip Abd/Add   White board updated in patients room to current assistance level   Donned an extra gown and gripping socks  Pt encouraged to ambulate in hallways 3x/day with nursing or family assistance to improve endurance.   Pt safe to ambulate in hallway with RN or PCT assistance   Bedside table in front of patient and area set up for function, convenience, and safety. RN aware of patient's mobility needs and status. Questions/concerns addressed within PTA scope of practice; patient with no further questions.      Patient left up in chair with all lines intact, call button in reach, nsg notified and significant other present..    GOALS:   Multidisciplinary Problems     Physical Therapy Goals        Problem: Physical Therapy Goal    Goal Priority Disciplines Outcome Goal Variances Interventions   Physical Therapy Goal     PT, PT/OT      Description:  Goals to be met by: 1/23/19    Patient will increase functional independence with mobility  by performin. Supine to sit with [place]-not met  2. Sit to supine with [place]-not met  3. Sit to stand transfer with Stand-by Assistance with RW -met  4. Gait  x 200 feet with Supervision using Rolling Walker. -not met                       Time Tracking:     PT Received On: 19  PT Start Time: 912     PT Stop Time: 937  PT Total Time (min): 25 min     Billable Minutes: Gait Training 15 and Therapeutic Activity 10    Treatment Type: Treatment  PT/PTA: PTA     PTA Visit Number: 1     Yonny Amador, DEYA  2019

## 2019-01-18 NOTE — PT/OT/SLP PROGRESS
Occupational Therapy      Patient Name:  Andra Newell   MRN:  [id number]    Occupational therapy visit attempted in afternoon. Pt reports she will discharging today to [facility]. Discussed DME needs. Pt reports significant other bought a bath bench for tub-shower. All questions/concerns answered with OT scope of practice. Pt declined OOB activity 2/2 waiting on arrival of lunch. Encourage pt to sit UIC or EOB to eat lunch. Pt verbalized understanding and no further concerns.     Aimee cowan, OT  1/18/2019

## 2019-01-18 NOTE — PROGRESS NOTES
Transplant Teaching Book given to patient, Andra Newell, during pre-op appts.  During the course of the hospital stay the patient received information regarding kidney transplant. Teaching and instruction were completed.  Areas that were discussed included: how to contact the Transplant Team, the importance of measuring intake of fluids and urine output, and monitoring vital signs such as blood pressure, temperature, and daily weights.  Parameters for which to report abnormal findings were given.  Appointment were provided along with the rational for the importance of lab work and clinic visits.  A written medication list was provided.  The importance of immunosuppressive medications, their common side effects, and treatment to prevent or minimize side effects has been reviewed.  Signs and symptoms of rejection and infection along with various treatments were reviewed.  The need to avoid infection was discussed.  Wound care and special consideration regarding activities of daily living were explained.  Written and verbal teaching of the above information was given.

## 2019-01-18 NOTE — PROGRESS NOTES
"DISCHARGE NOTE:    Andra Newell is a 58 y.o. female s/p LEFT KIDNEY   Living transplant on 1/14/2019 (Kidney) for ESRD secondary to Diabetes Mellitus - Type II.      Past Medical History:   Diagnosis Date    Anemia     Anemia in chronic kidney disease, on chronic dialysis 7/12/2017    Arthritis     Asthma     allergic airway    Colon polyp     Depression     Diabetes mellitus     Diverticulosis     Eosinophilia     ESRD (end stage renal disease)     stage V, due for living donor 9/2018    ESRD on dialysis     GERD (gastroesophageal reflux disease)     Gout     Gout     Hyperlipidemia     Hypertension     Low back pain     MRSA carrier     Obesity     Renal disorder     Rotator cuff syndrome--left     Thyroid disease     Ulnar neuropathy of right upper extremity     Uncontrolled type 2 diabetes mellitus with hyperglycemia        Hospital Course: Patient s/p kidney transplant on 1/14/19, [place] for induction, CMV D+/R-. Patient had a mostly uncomplicated hospital course with only complaints of indigestion and PT/OT issues. Famotidine frequency increased to BID to help with indigestion. Of note, patient is enrolled in the CMV study.     Allergies:   Review of patient's allergies indicates:   Allergen Reactions    Codeine Itching     Can take oxycodone and hydrocodone with Benadryl       Patient Pharmacy: [facility]    Discharge Medications:   Andra Newell   Home Medication Instructions MARICARMEN:[id number]    Printed on:01/18/19 1528   Medication Information                      atorvastatin (LIPITOR) 40 MG tablet  TAKE ONE TABLET BY MOUTH ONCE DAILY             BD ULTRA-FINE MINI PEN NEEDLE 31 gauge x 3/16" Ndle  USE ONE NEEDLE ONCE DAILY             bisacodyl (DULCOLAX) 5 mg EC tablet  Take 2 tablets (10 mg total) by mouth every evening.             docusate sodium (COLACE) 100 MG capsule  Take 1 capsule (100 mg total) by mouth 3 (three) times daily.             ergocalciferol " (ERGOCALCIFEROL) 50,000 unit Cap  Take 1 capsule (50,000 Units total) by mouth every 7 days. Take on Tues             famotidine (PEPCID) 20 MG tablet  Take 1 tablet (20 mg total) by mouth 2 (two) times daily.             insulin (BASAGLAR KWIKPEN U-100 INSULIN) glargine 100 units/mL (3mL) SubQ pen  Inject 12 Units into the skin every evening.             INV acyclovir/placebo capsule  Take 1 capsule (400 mg total) by mouth every 12 (twelve) hours.  For investigational use only. Protocol: MK-8228-002             INV MK-8228/placebo 480 mg oral tablet  Take 1 tablet (480 mg total) by mouth once daily. FOR INVESTIGATIONAL USE ONLY. Patient Study# [id number]. Protocol: MK-8228-002             INV valganciclovir/placebo tablet  Take 2 tablets (900 mg total) by mouth once daily. FOR INVESTIGATIONAL USE ONLY. Protocol: MK -8228-002             k phos di & mono-sod phos mono (K-PHOS-NEUTRAL) 250 mg Tab  Take 2 tablets by mouth 2 (two) times daily.             levothyroxine (SYNTHROID) 75 MCG tablet  TAKE ONE TABLET BY MOUTH ONCE DAILY             loratadine (CLARITIN) 10 mg tablet  TAKE ONE TABLET BY MOUTH ONCE DAILY             LORazepam (ATIVAN) 2 MG Tab  Take 1 tablet (2 mg total) by mouth 2 (two) times daily.             multivitamin (THERAGRAN) tablet  Take 1 tablet by mouth once daily.             mycophenolate (CELLCEPT) 250 mg Cap  Take 2 capsules (500 mg total) by mouth 2 (two) times daily.             oxyCODONE (ROXICODONE) 10 mg Tab immediate release tablet  Take 0.5-1 tablets (5-10 mg total) by mouth every 4 (four) hours as needed.             SITagliptin (JANUVIA) 100 MG Tab  Take 1 tablet (100 mg total) by mouth once daily.             sodium bicarbonate 650 MG tablet  Take 1 tablet (650 mg total) by mouth 2 (two) times daily.             sulfamethoxazole-trimethoprim 400-80mg (BACTRIM,SEPTRA) 400-80 mg per tablet  Take 1 tablet by mouth once daily. STOP 1/15/2020             tacrolimus (PROGRAF) 1 MG  Cap  Take 6 capsules (6 mg total) by mouth every 12 (twelve) hours.             VIIBRYD 40 mg Tab tablet  TAKE ONE TABLET BY MOUTH ONCE DAILY                 Pharmacy Interventions/Recommendations:  1) Transplant Immunosuppression: CellCept 500 mg BID, Prograf 6 mg BID, no prednisone    2) Opportunistic Infection prophylaxis: Bactrim SS daily; CMV study     3) Patient Counseling/Education: Demonstrated the use of the BP cuff, thermometer.    4) Follow-Up/Discharge Needs: Patient to receive study drug for antiviral ppx; please follow up to see if patient has received medication (to be supplied via study/).    5) Patient received prescriptions for:      E-rx's:  See above          6) Patient Assistance Information: n/a    7) The following medications have been placed on HOLD and should be restarted in the outpatient setting (when appropriate): n/a    Andra and her caregiver verbalized their understanding and had the opportunity to ask questions.

## 2019-01-18 NOTE — PROGRESS NOTES
EDUCATION NOTE:    Met with Andra Newell and her caregivers to provide teaching re: immunosuppressant medications.  Reviewed medication section of the Kidney Transplant Education book that was provided.  Emphasized the importance of compliance, role of the blue medication card, concerns for drug interactions, and process of obtaining refills.  Counseled regarding Prograf, Cellcept , prednisone, including directions for use, monitoring, how to handle missed doses, and side effects.  She verbalized understanding and had the opportunity to ask questions.

## 2019-01-18 NOTE — PLAN OF CARE
Problem: Adult Inpatient Plan of Care  Goal: Plan of Care Review  Outcome: Ongoing (interventions implemented as appropriate)  AAOx3, afebrile, c/o pain. PRN pain medication and benadryl given. No complaints of indigestion thus far this shift. Telemetry monitor in place (NSR). BG monitored and tx per orders. Self meds and incision care 100%. Pt able to position self independently in bed. Stand by assist OOB. Pt in lowest position, side rails up x2, non-skid foot wear in place, call light within reach, pt verbalized understanding to call RN when needed. Hand hygiene practiced per protocol. Will continue to monitor.

## 2019-01-18 NOTE — NURSING
Pt is AAOx4, stand by assist, and VSS. Pt denies pain. Heparin injection given. Incision cleansed with iodine swabs and gauzed changed-serosang drainage. Medications and blue card provided by pharmacy. ID also provided medications. Blood glucose monitoring performed and treated as ordered. VISI and telemetry monitoring in place. Central line removed prior to DC-pt verbalized understanding to keep pressure dressing dry and intact for 24 hrs. DC instructions explained and handout provided on when to call MD, upcoming appointments, medication list, PMH, [facility], and pt portal. Pt and pt's significant other verbalized understanding and all questions answered to satisfaction. Pt transported via wheelchair to parking garage accompanied by significant other.

## 2019-01-18 NOTE — PROGRESS NOTES
"[facility]  Endocrinology  Progress Note    Admit Date: 2019     Reason for Consult: Management of T2DM, Hyperglycemia     Surgical Procedure and Date: OKTx 19    Diabetes diagnosis year:     Home Diabetes Medications:     Basaglar 80 units BID    How often checking glucose at home? "only when I am feeling bad."    BG readings on regimen: 200's (when she is feeling bad)  Hypoglycemia on the regimen?  Unknown  Missed doses on regimen?  Misses nightly dose around 2 x month.     Diabetes Complications include:     Hyperglycemia, Hypoglycemia unawareness and Diabetic peripheral neuropathy     Complicating diabetes co morbidities:   ESRD, Thyroid disease, Obesity       HPI:   Patient is a 58 y.o. female with a diagnosis of ESRD, DM2, Thyroid Disease, and Obesity. She is s/p OKTx 19. Endocrinology consulted for management of hyperglycemia/DM. Patient's PCP (in [place], [place]) manages her DM.     Lab Results   Component Value Date    HGBA1C 6.6 (H) 2019       Interval HPI:   Overnight events: Remains in TSU. Tentative discharge today. BG below goal with scheduled insulin.   Eatin%  Nausea: No  Hypoglycemia and intervention: No  Fever: No  TPN and/or TF: No      /77   Pulse 76   Temp 98.8 °F (37.1 °C) (Oral)   Resp 13   Ht 5' 6.5" (1.689 m)   Wt 102.1 kg (225 lb 1.4 oz)   LMP 2012 (Exact Date)   SpO2 (!) 94%   Breastfeeding? No   BMI 35.79 kg/m²      Labs Reviewed and Include    Recent Labs   Lab 19  0611   *   CALCIUM 8.5*   ALBUMIN 2.7*      K 4.0   CO2 26      BUN 21*   CREATININE 1.1     Lab Results   Component Value Date    WBC 5.29 2019    HGB 8.7 (L) 2019    HCT 27.7 (L) 2019    MCV 93 2019     (L) 2019     No results for input(s): TSH, FREET4 in the last 168 hours.  Lab Results   Component Value Date    HGBA1C 6.6 (H) 2019       Nutritional status:   Body mass index is 35.79 " kg/m².  Lab Results   Component Value Date    ALBUMIN 2.7 (L) 01/18/2019    ALBUMIN 2.8 (L) 01/17/2019    ALBUMIN 3.0 (L) 01/16/2019     No results found for: PREALBUMIN    Estimated Creatinine Clearance: 66.2 mL/min (based on SCr of 1.1 mg/dL).    Accu-Checks  Recent Labs     01/16/19  0739 01/16/19  1138 01/16/19  1707 01/16/19  2058 01/17/19  0755 01/17/19  1200 01/17/19  1812 01/17/19  2201 01/18/19  0751 01/18/19  1218   POCTGLUCOSE 187* 213* 228* 172* 110 97 112* 121* 106 109       Current Medications and/or Treatments Impacting Glycemic Control  Immunotherapy:    Immunosuppressants         Stop Route Frequency     tacrolimus capsule 6 mg      -- Oral 2 times daily     mycophenolate capsule 500 mg      -- Oral 2 times daily        Steroids:   Hormones (From admission, onward)    None        Pressors:    Autonomic Drugs (From admission, onward)    Start     Stop Route Frequency Ordered    01/14/19 1229  cisatracurium (NIMBEX) 2 mg/mL injection     Comments:  Created by cabinet override    01/15 0044   01/14/19 1229        Hyperglycemia/Diabetes Medications:   Antihyperglycemics (From admission, onward)    Start     Stop Route Frequency Ordered    01/18/19 2100  insulin detemir U-100 pen 12 Units      -- SubQ Nightly 01/18/19 0753    01/18/19 0800  insulin aspart U-100 pen 4 Units      -- SubQ 3 times daily with meals 01/18/19 0754    01/15/19 0952  insulin aspart U-100 pen 0-5 Units      -- SubQ Before meals & nightly PRN 01/15/19 0853          ASSESSMENT and PLAN    * Status post living-donor kidney transplantation    Managed per primary team  Avoid hypoglycemia         Uncontrolled type 2 diabetes mellitus with stage 5 chronic kidney disease not on chronic dialysis, with long-term current use of insulin    BG goal 140-180    Decrease Levemir 12 units HS  Decrease Novolog 4 units TIDWM  Low correction scale.   BG monitoring AC/HS.     Discharge Recommendations: basaglar 12 units HS. Januvia 100 mg daily; no  history of pancreatitis or medullary thyroid ca (personal or family).        Adverse effect of adrenal cortical steroids, sequela    On standard steroid taper per transplant team; may elevate BG readings         Prophylactic immunotherapy    On standard steroid taper per transplant team; may elevate BG readings         BMI 36.0-36.9,adult    may contribute to insulin resistance  Body mass index is 35.93 kg/m².             Anastacia Dill NP  Endocrinology  [facility]

## 2019-01-18 NOTE — PROGRESS NOTES
Patient admitted for Kidney transplant.Transplant coordinator met with the patient on rounds to introduce self and explain the coordinator role. The post-transplant teaching book was given. Transplant Coordinator explained that she will follow the patient while in the hospital and assist with discharge.     Pt admitted for LURD kidney transplant. Pt was on HD x4 months and then off.  CKD 2/2 to DM/HTN  3 day bull. RLQ seanon BA with coral Araya induction, CMV MISMATCH

## 2019-01-18 NOTE — PLAN OF CARE
Problem: Physical Therapy Goal  Goal: Physical Therapy Goal  Goals to be met by: 19    Patient will increase functional independence with mobility by performin. Supine to sit with [place]-not met  2. Sit to supine with [place]-not met  3. Sit to stand transfer with Stand-by Assistance with RW -met  4. Gait  x 200 feet with Supervision using Rolling Walker. -not met     Pt progressing towards goals. continue with PT POC.Goals remain appropriate.  oYnny Amador PTA  2019

## 2019-01-18 NOTE — PROGRESS NOTES
Discharge Note:    OPAL met with pt and s/o Any in room to discuss discharge plan and to assess needs. Pt reports coping adequately at this time and presents as alert and oriented x4 and states is in high spirits.  Pt scheduled to discharge today to [facility] under the care of Any with no needs at this time (pt was ordered to have PT/OT by has denied services. SW reported concern to medical team). Pt will discharge with walker and commode (pt already has a scooter). OPAL reviewed discharge plan with pt. Pt aware of and involved in discharge plan. Any to transport pt.  Pt denies having any concerns at this time as well. Pt verbalized understanding and agreement with information reviewed, social work availability, and how to assess available resources as needed. SW remains available.

## 2019-01-19 ENCOUNTER — TELEPHONE (OUTPATIENT)
Dept: TRANSPLANT | Facility: CLINIC | Age: 59
End: 2019-01-19

## 2019-01-19 ENCOUNTER — LAB VISIT (OUTPATIENT)
Dept: LAB | Facility: HOSPITAL | Age: 59
End: 2019-01-19
Attending: INTERNAL MEDICINE
Payer: MEDICARE

## 2019-01-19 DIAGNOSIS — Z94.0 KIDNEY REPLACED BY TRANSPLANT: ICD-10-CM

## 2019-01-19 LAB
ALBUMIN SERPL BCP-MCNC: 3.1 G/DL
ANION GAP SERPL CALC-SCNC: 7 MMOL/L
BASOPHILS # BLD AUTO: 0.02 K/UL
BASOPHILS NFR BLD: 0.3 %
BUN SERPL-MCNC: 17 MG/DL
CALCIUM SERPL-MCNC: 8.9 MG/DL
CHLORIDE SERPL-SCNC: 103 MMOL/L
CO2 SERPL-SCNC: 28 MMOL/L
CREAT SERPL-MCNC: 1.1 MG/DL
DIFFERENTIAL METHOD: ABNORMAL
EOSINOPHIL # BLD AUTO: 0 K/UL
EOSINOPHIL NFR BLD: 0.5 %
ERYTHROCYTE [DISTWIDTH] IN BLOOD BY AUTOMATED COUNT: 12.6 %
EST. GFR  (AFRICAN AMERICAN): >60 ML/MIN/1.73 M^2
EST. GFR  (NON AFRICAN AMERICAN): 54.7 ML/MIN/1.73 M^2
GLUCOSE SERPL-MCNC: 148 MG/DL
HCT VFR BLD AUTO: 30.9 %
HGB BLD-MCNC: 9.7 G/DL
IMM GRANULOCYTES # BLD AUTO: 0.09 K/UL
IMM GRANULOCYTES NFR BLD AUTO: 1.4 %
LYMPHOCYTES # BLD AUTO: 0 K/UL
LYMPHOCYTES NFR BLD: 0.3 %
MAGNESIUM SERPL-MCNC: 1.8 MG/DL
MCH RBC QN AUTO: 29 PG
MCHC RBC AUTO-ENTMCNC: 31.4 G/DL
MCV RBC AUTO: 93 FL
MONOCYTES # BLD AUTO: 0.2 K/UL
MONOCYTES NFR BLD: 3.8 %
NEUTROPHILS # BLD AUTO: 5.9 K/UL
NEUTROPHILS NFR BLD: 93.7 %
NRBC BLD-RTO: 0 /100 WBC
PHOSPHATE SERPL-MCNC: 3.4 MG/DL
PLATELET # BLD AUTO: 132 K/UL
PMV BLD AUTO: 10.4 FL
POTASSIUM SERPL-SCNC: 4.4 MMOL/L
RBC # BLD AUTO: 3.34 M/UL
SODIUM SERPL-SCNC: 138 MMOL/L
TACROLIMUS BLD-MCNC: 16 NG/ML
WBC # BLD AUTO: 6.32 K/UL

## 2019-01-19 PROCEDURE — 83735 ASSAY OF MAGNESIUM: CPT

## 2019-01-19 PROCEDURE — 85025 COMPLETE CBC W/AUTO DIFF WBC: CPT

## 2019-01-19 PROCEDURE — 36415 COLL VENOUS BLD VENIPUNCTURE: CPT

## 2019-01-19 PROCEDURE — 80197 ASSAY OF TACROLIMUS: CPT

## 2019-01-19 PROCEDURE — 80069 RENAL FUNCTION PANEL: CPT

## 2019-01-19 RX ORDER — TACROLIMUS 1 MG/1
4 CAPSULE ORAL EVERY 12 HOURS
Qty: 240 CAPSULE | Refills: 11 | Status: SHIPPED | OUTPATIENT
Start: 2019-01-19 | End: 2019-01-24 | Stop reason: DRUGHIGH

## 2019-01-19 NOTE — TELEPHONE ENCOUNTER
Spoke to pt, reviewed labs and Dr Mercado's instructions.  Pt confirms that she took her prograf after labs were drawn.  She will hold prograf dose tonight and decrease to 4mg BID starting tomorrow morning.  Confirmed 12hr labs on Monday, 1/21/19.  Pt voices no complaints.

## 2019-01-19 NOTE — TELEPHONE ENCOUNTER
----- Message from Maureen Cabral MD sent at 1/19/2019  1:33 PM CST -----  The following message sent to patient via [facility]: Check if lab drawn 12 hrs post dose. If accurate, please lower to 4 mg BID and ask her to hold one tacro dose tonight. Repeat lab Monday.

## 2019-01-20 ENCOUNTER — NURSE TRIAGE (OUTPATIENT)
Dept: ADMINISTRATIVE | Facility: CLINIC | Age: 59
End: 2019-01-20

## 2019-01-20 NOTE — DISCHARGE SUMMARY
"[facility]  Kidney Transplant  Discharge Summary    Patient Name: Andra Newell  MRN: [id number]  Admission Date: 1/14/2019  Hospital Length of Stay: 4 days  Discharge Date and Time: 1/18/2019  4:28 PM  Attending Physician: Cailin att. providers found   Discharging Provider: Lidia Elizabeth PA-C  Primary Care Provider: Gita Cohen MD    Andra Newell is a 38 y/o F with ESRD 2/2 DM II started on HD 4/13/18 on Monday/Friday schedule, stopped after 4 months (September 2018) due to stable renal function. Patient essentially pre-dialysis. She is now s/p LURT 1/14/19 (Campath induction, CMV D+/R-, TIT 58 min). Immunosuppression per Campath induction protocol. She is a CMV study participant. Surgery uncomplicated. Patient progressed well post-operatively, making excellent urine & with Cr trending down daily. Her bull was removed on POD #3 without difficulty. She had no drains. She was kept inpatient an additional day due to CP/indigestion (EKG NSR, troponin wnl, relieved with increase in pepsid frequency + maalox).    Of note, pre-txp, patient used scooter at home and for long distances due to multiple knee replacements and achilles tendon injury. PT/OT saw patient, recommended  PT/OT. However, pt refused stating she feels her impaired mobility is chronic and she "gets around fine."     Patient is stable for discharge to [facility] at this time. Follow up to include labs Saturday 1/19, labs Sunday 1/20 (pending Sat labs), and transplant clinic appointment with coordinator and pharmacist on Monday, 1/21/19.        Final Active Diagnoses:    Diagnosis Date Noted POA    PRINCIPAL PROBLEM:  Status post living-donor kidney transplantation [Z94.0] 01/15/2019 Not Applicable    Prophylactic immunotherapy [Z29.8] 09/06/2017 Not Applicable    At risk for opportunistic infections [Z91.89] 01/15/2019 No    Hypophosphatemia [E83.39] 01/17/2019 No    Acute blood loss anemia [D62] 01/17/2019 No    " Thrombocytopenia, unspecified [D69.6] 01/17/2019 No    Adverse effect of adrenal cortical steroids, sequela [T38.0X5S] 01/15/2019 Not Applicable    Long-term use of immunosuppressant medication [Z79.899] 01/15/2019 Not Applicable    Vitamin D deficiency [E55.9] 01/15/2019 Yes    Metabolic acidosis [E87.2] 01/15/2019 Yes    Presence of surgical incision [Z78.9] 01/15/2019 Yes    BMI 36.0-36.9,adult [Z68.36] 11/23/2018 Not Applicable    Arthritis [M19.90] 07/12/2017 Yes     Chronic    Hypothyroidism [E03.9] 03/27/2017 Yes    Depression [F32.9] 01/21/2015 Yes    Uncontrolled type 2 diabetes mellitus with stage 5 chronic kidney disease not on chronic dialysis, with long-term current use of insulin [E11.22, E11.65, N18.5, Z79.4] 09/27/2013 Not Applicable      Problems Resolved During this Admission:    Diagnosis Date Noted Date Resolved POA    ESRD on dialysis [N18.6, Z99.2]  01/15/2019 Not Applicable       Treatments: See above.    Consults (From admission, onward)        Status Ordering Provider     Inpatient consult to Endocrinology  Once     Provider:  (Not yet assigned)    Completed ABRAM ROCHA     Inpatient consult to Registered Dietitian/Nutritionist  Once     Provider:  (Not yet assigned)    Completed EYAD ACOSTA     IP Consult to Kidney/Pancreas Transplant Medicine  Once     Provider:  (Not yet assigned)    Completed EYAD ACOSTA          Pending Diagnostic Studies:     Procedure Component Value Units Date/Time    PTH, intact [[id number]] Collected:  01/14/19 1110    Order Status:  Sent Lab Status:  In process Updated:  01/14/19 1110    Specimen:  Blood     Renal function panel [[id number]] Collected:  01/14/19 1110    Order Status:  Sent Lab Status:  In process Updated:  01/14/19 1110    Specimen:  Blood     Vitamin D 25 Hydroxy [[id number]] Collected:  01/14/19 1827    Order Status:  Sent Lab Status:  In process Updated:  01/14/19 1828    Specimen:  Blood         Significant Diagnostic  "Studies: Labs:   CMP   Recent Labs   Lab 01/19/19  0830      K 4.4      CO2 28   *   BUN 17   CREATININE 1.1   CALCIUM 8.9   ALBUMIN 3.1*   ANIONGAP 7*   ESTGFRAFRICA >60.0   EGFRNONAA 54.7*   , CBC   Recent Labs   Lab 01/19/19  0830   WBC 6.32   HGB 9.7*   HCT 30.9*   *    and INR   Lab Results   Component Value Date    INR 1.0 01/14/2019    INR 1.0 01/03/2019    INR 1.0 10/09/2018       Discharged Condition: good    Disposition: Home or Self Care    Follow Up:    Patient Instructions:      WALKER FOR HOME USE     Order Specific Question Answer Comments   Type of Walker: Adult (5'4"-6'6")    With wheels? Yes    Height: 5' 6.5" (1.689 m)    Weight: 102.5 kg (225 lb 15.5 oz)    Length of need (1-99 months): 99    Does patient have medical equipment at home? power chair Does not use but owns: SW   Please check all that apply: Patient's condition impairs ambulation.    Vendor: Other (use comments) Pt owns scooter   Expected Date of Delivery: 1/17/2019      Referral to Home health   Referral Priority: Routine Referral Type: Home Health Care   Referral Reason: Specialty Services Required   Requested Specialty: Home Health Services   Number of Visits Requested: 1     Diet diabetic     Notify your health care provider if you experience any of the following:   Order Comments: Any other concerning signs or symptoms.     Notify your health care provider if you experience any of the following:  increased confusion or weakness     Notify your health care provider if you experience any of the following:  persistent dizziness, light-headedness, or visual disturbances     Notify your health care provider if you experience any of the following:  worsening rash     Notify your health care provider if you experience any of the following:  severe persistent headache     Notify your health care provider if you experience any of the following:  difficulty breathing or increased cough     Notify your health care " provider if you experience any of the following:  redness, tenderness, or signs of infection (pain, swelling, redness, odor or green/yellow discharge around incision site)     Notify your health care provider if you experience any of the following:  severe uncontrolled pain     Notify your health care provider if you experience any of the following:  persistent nausea and vomiting or diarrhea     Notify your health care provider if you experience any of the following:  temperature >100.4     Activity as tolerated     Medications:  Reconciled Home Medications:      Medication List      START taking these medications    bisacodyl 5 mg EC tablet  Commonly known as:  DULCOLAX  Take 2 tablets (10 mg total) by mouth every evening.     docusate sodium 100 MG capsule  Commonly known as:  COLACE  Take 1 capsule (100 mg total) by mouth 3 (three) times daily.     INV acyclovir/placebo 400 MG capsule  Take 1 capsule (400 mg total) by mouth every 12 (twelve) hours.  For investigational use only. Protocol: MK-8228-002     INV MK-8228/placebo 480 mg oral tablet  Take 1 tablet (480 mg total) by mouth once daily. FOR INVESTIGATIONAL USE ONLY. Patient Study# [id number]. Protocol: MK-8228-002     INV valganciclovir/placebo 450 mg tablet  Take 2 tablets (900 mg total) by mouth once daily. FOR INVESTIGATIONAL USE ONLY. Protocol: MK -8228-002     JANUVIA 100 MG Tab  Generic drug:  SITagliptin  Take 1 tablet (100 mg total) by mouth once daily.     multivitamin tablet  Commonly known as:  THERAGRAN  Take 1 tablet by mouth once daily.     mycophenolate 250 mg Cap  Commonly known as:  CELLCEPT  Take 2 capsules (500 mg total) by mouth 2 (two) times daily.     oxyCODONE 10 mg Tab immediate release tablet  Commonly known as:  ROXICODONE  Take 0.5-1 tablets (5-10 mg total) by mouth every 4 (four) hours as needed.     PHOSPHA 250 NEUTRAL 250 mg Tab  Generic drug:  k phos di & mono-sod phos mono  Take 2 tablets by mouth 2 (two) times daily.    "  sodium bicarbonate 650 MG tablet  Take 1 tablet (650 mg total) by mouth 2 (two) times daily.     sulfamethoxazole-trimethoprim 400-80mg 400-80 mg per tablet  Commonly known as:  BACTRIM,SEPTRA  Take 1 tablet by mouth once daily. STOP 1/15/2020     VITAMIN D2 50,000 unit Cap  Generic drug:  ergocalciferol  Take 1 capsule (50,000 Units total) by mouth every 7 days. Take on Tues  Start taking on:  1/22/2019        CHANGE how you take these medications    famotidine 20 MG tablet  Commonly known as:  PEPCID  Take 1 tablet (20 mg total) by mouth 2 (two) times daily.  What changed:    · when to take this  · reasons to take this     insulin glargine 100 units/mL (3mL) SubQ pen  Commonly known as:  BASAGLAR KWIKPEN U-100 INSULIN  Inject 12 Units into the skin every evening.  What changed:    · how much to take  · when to take this     LORazepam 2 MG Tab  Commonly known as:  ATIVAN  Take 1 tablet (2 mg total) by mouth 2 (two) times daily.  What changed:  how much to take        CONTINUE taking these medications    atorvastatin 40 MG tablet  Commonly known as:  LIPITOR  TAKE ONE TABLET BY MOUTH ONCE DAILY     BD ULTRA-FINE MINI PEN NEEDLE 31 gauge x 3/16" Ndle  Generic drug:  pen needle, diabetic  USE ONE NEEDLE ONCE DAILY     levothyroxine 75 MCG tablet  Commonly known as:  SYNTHROID  TAKE ONE TABLET BY MOUTH ONCE DAILY     loratadine 10 mg tablet  Commonly known as:  CLARITIN  TAKE ONE TABLET BY MOUTH ONCE DAILY     VIIBRYD 40 mg Tab tablet  Generic drug:  vilazodone  TAKE ONE TABLET BY MOUTH ONCE DAILY        STOP taking these medications    calcitRIOL 0.25 MCG Cap  Commonly known as:  ROCALTROL     furosemide 20 MG tablet  Commonly known as:  LASIX     hydrALAZINE 25 MG tablet  Commonly known as:  APRESOLINE     lisinopril 10 MG tablet          Time spent caring for patient (Greater than 1/2 spent in direct face-to-face contact): > 30 minutes    Lidia Elizabeth PA-C  Kidney Transplant  [facility]  "

## 2019-01-21 ENCOUNTER — LAB VISIT (OUTPATIENT)
Dept: LAB | Facility: HOSPITAL | Age: 59
End: 2019-01-21
Attending: INTERNAL MEDICINE
Payer: MEDICARE

## 2019-01-21 ENCOUNTER — CLINICAL SUPPORT (OUTPATIENT)
Dept: TRANSPLANT | Facility: CLINIC | Age: 59
End: 2019-01-21
Payer: MEDICARE

## 2019-01-21 ENCOUNTER — TELEPHONE (OUTPATIENT)
Dept: TRANSPLANT | Facility: CLINIC | Age: 59
End: 2019-01-21

## 2019-01-21 ENCOUNTER — PATIENT MESSAGE (OUTPATIENT)
Dept: ENDOCRINOLOGY | Facility: CLINIC | Age: 59
End: 2019-01-21

## 2019-01-21 VITALS
DIASTOLIC BLOOD PRESSURE: 71 MMHG | SYSTOLIC BLOOD PRESSURE: 149 MMHG | HEIGHT: 66 IN | OXYGEN SATURATION: 98 % | RESPIRATION RATE: 18 BRPM | SYSTOLIC BLOOD PRESSURE: 149 MMHG | OXYGEN SATURATION: 98 % | HEART RATE: 92 BPM | TEMPERATURE: 98 F | HEART RATE: 92 BPM | WEIGHT: 221.13 LBS | WEIGHT: 221.13 LBS | DIASTOLIC BLOOD PRESSURE: 71 MMHG | BODY MASS INDEX: 35.54 KG/M2 | HEIGHT: 66 IN | BODY MASS INDEX: 35.54 KG/M2 | TEMPERATURE: 98 F | RESPIRATION RATE: 18 BRPM

## 2019-01-21 DIAGNOSIS — E11.22 TYPE 2 DIABETES MELLITUS WITH CHRONIC KIDNEY DISEASE, WITH LONG-TERM CURRENT USE OF INSULIN, UNSPECIFIED CKD STAGE: Primary | ICD-10-CM

## 2019-01-21 DIAGNOSIS — Z79.4 TYPE 2 DIABETES MELLITUS WITH CHRONIC KIDNEY DISEASE, WITH LONG-TERM CURRENT USE OF INSULIN, UNSPECIFIED CKD STAGE: Primary | ICD-10-CM

## 2019-01-21 DIAGNOSIS — Z94.0 KIDNEY REPLACED BY TRANSPLANT: ICD-10-CM

## 2019-01-21 LAB
ALBUMIN SERPL BCP-MCNC: 3.2 G/DL
ANION GAP SERPL CALC-SCNC: 6 MMOL/L
BASOPHILS # BLD AUTO: 0.05 K/UL
BASOPHILS NFR BLD: 0.5 %
BUN SERPL-MCNC: 15 MG/DL
CALCIUM SERPL-MCNC: 9.1 MG/DL
CHLORIDE SERPL-SCNC: 107 MMOL/L
CO2 SERPL-SCNC: 27 MMOL/L
CREAT SERPL-MCNC: 1.1 MG/DL
DIFFERENTIAL METHOD: ABNORMAL
EOSINOPHIL # BLD AUTO: 0 K/UL
EOSINOPHIL NFR BLD: 0 %
ERYTHROCYTE [DISTWIDTH] IN BLOOD BY AUTOMATED COUNT: 12.8 %
EST. GFR  (AFRICAN AMERICAN): >60 ML/MIN/1.73 M^2
EST. GFR  (NON AFRICAN AMERICAN): 54.7 ML/MIN/1.73 M^2
GLUCOSE SERPL-MCNC: 183 MG/DL
HCT VFR BLD AUTO: 32.3 %
HGB BLD-MCNC: 10.4 G/DL
IMM GRANULOCYTES # BLD AUTO: 0.18 K/UL
IMM GRANULOCYTES NFR BLD AUTO: 1.8 %
LYMPHOCYTES # BLD AUTO: 0.2 K/UL
LYMPHOCYTES NFR BLD: 1.8 %
MAGNESIUM SERPL-MCNC: 1.5 MG/DL
MCH RBC QN AUTO: 30.1 PG
MCHC RBC AUTO-ENTMCNC: 32.2 G/DL
MCV RBC AUTO: 94 FL
MONOCYTES # BLD AUTO: 0.3 K/UL
MONOCYTES NFR BLD: 2.9 %
NEUTROPHILS # BLD AUTO: 9.1 K/UL
NEUTROPHILS NFR BLD: 93 %
NRBC BLD-RTO: 0 /100 WBC
PHOSPHATE SERPL-MCNC: 2.6 MG/DL
PLATELET # BLD AUTO: 172 K/UL
PMV BLD AUTO: 10 FL
POTASSIUM SERPL-SCNC: 5.1 MMOL/L
RBC # BLD AUTO: 3.45 M/UL
SODIUM SERPL-SCNC: 140 MMOL/L
TACROLIMUS BLD-MCNC: 25.1 NG/ML
WBC # BLD AUTO: 9.77 K/UL

## 2019-01-21 PROCEDURE — 85025 COMPLETE CBC W/AUTO DIFF WBC: CPT

## 2019-01-21 PROCEDURE — 36415 COLL VENOUS BLD VENIPUNCTURE: CPT

## 2019-01-21 PROCEDURE — 99999 PR PBB SHADOW E&M-EST. PATIENT-LVL IV: CPT | Mod: PBBFAC,,,

## 2019-01-21 PROCEDURE — 80069 RENAL FUNCTION PANEL: CPT

## 2019-01-21 PROCEDURE — 99999 PR PBB SHADOW E&M-EST. PATIENT-LVL IV: ICD-10-PCS | Mod: PBBFAC,,,

## 2019-01-21 PROCEDURE — 83735 ASSAY OF MAGNESIUM: CPT

## 2019-01-21 PROCEDURE — 80197 ASSAY OF TACROLIMUS: CPT

## 2019-01-21 PROCEDURE — 99999 PR PBB SHADOW E&M-EST. PATIENT-LVL III: ICD-10-PCS | Mod: PBBFAC,,,

## 2019-01-21 PROCEDURE — 99214 OFFICE O/P EST MOD 30 MIN: CPT | Mod: PBBFAC

## 2019-01-21 PROCEDURE — 99999 PR PBB SHADOW E&M-EST. PATIENT-LVL III: CPT | Mod: PBBFAC,,,

## 2019-01-21 PROCEDURE — 99213 OFFICE O/P EST LOW 20 MIN: CPT | Mod: PBBFAC,27

## 2019-01-21 RX ORDER — INSULIN GLARGINE 100 [IU]/ML
16 INJECTION, SOLUTION SUBCUTANEOUS NIGHTLY
Qty: 15 ML | Refills: 0 | Status: ON HOLD
Start: 2019-01-21 | End: 2019-02-07 | Stop reason: SDUPTHER

## 2019-01-21 NOTE — TELEPHONE ENCOUNTER
----- Message from Maureen Cabral MD sent at 1/21/2019  1:29 PM CST -----  The following message sent to patient via [facility]: If this is an accurate 12 hr level, HOLD tacrolimus until further notice.Keep checking labs daily so we can get this back in the target range.

## 2019-01-21 NOTE — PROGRESS NOTES
"1ST NURSING VISIT POST DISCHARGE NOTE    1st RN appointment with Andra Newell post discharge 1/18/2019 s/p kidney transplant 1/14/19.  Patient's spouse  Any accompanied her.  Patient reports none.  Patient says that she that she is sleeping well.  Incision intact with staples.  Patient has N/A.  Patient that she is able to explain daily incision care and showering instructions.  Reviewed I&O monitoring, measuring, and recording, and the need for hydration (i.e., at least 2 liters of water daily with minimal caffeine and no grapefruit products).  Medication list and rationale were reviewed.  Patient did bring blue medication card and medication bottles for review.  Patient reports that she has stopped Dulcolax and has continued Colace.  Patient has had a bowel movement.  Patient expressed understanding of daily care including BID VS, medications, and I&O documentation.  Patient made aware of today's creatinine level: 1.1.  Patient aware that coordinator will review today's labs with a transplant physician and call the patient with any dose changes indicated.  Next lab appointment scheduled for 1/24/19.  First post-operative transplant team appointment with labs scheduled for 1/28/18.    Using the Kidney Transplant Patient Reference Manual, the patient submitted her open book "Self-assessment of Kidney Transplant Patient Knowledge" test, which was completed in the transplant clinic this morning before 1st nursing visit.  This test includes questions regarding critical dose medications commonly used after kidney transplant, medication dosing and side effects, importance of timed lab draws, important signs and symptoms to report 24/7 immediately post-transplant as well as how to contact the transplant team 24/7.    Test Score: 22/22    After completing the test, the patient was given a copy of the Self-assessment Answer Key to reinforce accurate learning of test content.  Patient expressed her understanding of " the value of the information included in the self-assessment test.

## 2019-01-21 NOTE — TELEPHONE ENCOUNTER
Patient partner Any repeated back and voice a understanding of orders.Next labs schedule for tomorrow 1/22/19.

## 2019-01-21 NOTE — PROGRESS NOTES
"Clinic Note: First Return to Clinic Post-  Kidney Transplant    Andra Newell  is a 58 y.o. female  S/p LEFT KIDNEY   transplant on 1/14/2019 (Kidney) for Diabetes Mellitus - Type II.      Discharge Course (Issues/Concerns): Patient called into endocrine for hypeglycemia. Lantus dose was changed from 12 units to 16 units. Patient also leaking from incision site, but reports no other issues.    Objective:   Vitals:    01/21/19 0827   BP: (!) 149/71   Pulse: 92   Resp: 18   Temp: 98.2 °F (36.8 °C)       Met with patient and her caregiver in the clinic to review current medication list.     Current Outpatient Medications   Medication Sig Dispense Refill    atorvastatin (LIPITOR) 40 MG tablet TAKE ONE TABLET BY MOUTH ONCE DAILY 90 tablet 0    BD ULTRA-FINE MINI PEN NEEDLE 31 gauge x 3/16" Ndle USE ONE NEEDLE ONCE DAILY 100 each 0    bisacodyl (DULCOLAX) 5 mg EC tablet Take 2 tablets (10 mg total) by mouth every evening.  0    docusate sodium (COLACE) 100 MG capsule Take 1 capsule (100 mg total) by mouth 3 (three) times daily.  0    [START ON 1/22/2019] ergocalciferol (ERGOCALCIFEROL) 50,000 unit Cap Take 1 capsule (50,000 Units total) by mouth every 7 days. Take on Tues 4 capsule 2    famotidine (PEPCID) 20 MG tablet Take 1 tablet (20 mg total) by mouth 2 (two) times daily. 60 tablet 11    insulin (BASAGLAR KWIKPEN U-100 INSULIN) glargine 100 units/mL (3mL) SubQ pen Inject 12 Units into the skin every evening. 15 mL 0    INV acyclovir/placebo capsule Take 1 capsule (400 mg total) by mouth every 12 (twelve) hours.  For investigational use only. Protocol: MK-8228-002 34 capsule 0    INV MK-8228/placebo 480 mg oral tablet Take 1 tablet (480 mg total) by mouth once daily. FOR INVESTIGATIONAL USE ONLY. Patient Study# [id number]. Protocol: MK-8228-002 17 tablet 0    INV valganciclovir/placebo tablet Take 2 tablets (900 mg total) by mouth once daily. FOR INVESTIGATIONAL USE ONLY. Protocol: MK -8228-002 34 tablet 0 "    k phos di & mono-sod phos mono (K-PHOS-NEUTRAL) 250 mg Tab Take 2 tablets by mouth 2 (two) times daily. 120 tablet 11    levothyroxine (SYNTHROID) 75 MCG tablet TAKE ONE TABLET BY MOUTH ONCE DAILY 30 tablet 4    loratadine (CLARITIN) 10 mg tablet TAKE ONE TABLET BY MOUTH ONCE DAILY 90 tablet 0    LORazepam (ATIVAN) 2 MG Tab Take 1 tablet (2 mg total) by mouth 2 (two) times daily. (Patient taking differently: Take 1 mg by mouth 2 (two) times daily. ) 60 tablet 2    multivitamin (THERAGRAN) tablet Take 1 tablet by mouth once daily.      mycophenolate (CELLCEPT) 250 mg Cap Take 2 capsules (500 mg total) by mouth 2 (two) times daily. 120 capsule 11    oxyCODONE (ROXICODONE) 10 mg Tab immediate release tablet Take 0.5-1 tablets (5-10 mg total) by mouth every 4 (four) hours as needed. 40 tablet 0    SITagliptin (JANUVIA) 100 MG Tab Take 1 tablet (100 mg total) by mouth once daily. 90 tablet 3    sulfamethoxazole-trimethoprim 400-80mg (BACTRIM,SEPTRA) 400-80 mg per tablet Take 1 tablet by mouth once daily. STOP 1/15/2020 30 tablet 11    tacrolimus (PROGRAF) 1 MG Cap Take 4 capsules (4 mg total) by mouth every 12 (twelve) hours. Z94.0 kidney transplant 240 capsule 11    VIIBRYD 40 mg Tab tablet TAKE ONE TABLET BY MOUTH ONCE DAILY 30 tablet 4     No current facility-administered medications for this visit.        Pharmacy Interventions/Recommendations:     1) Graft Function & Immunosuppression Issues:     2) Opportunistic Infection prophylaxis:   PCP ppx: Bactrim  CMV ppx: INV Study medication  Fungal ppx: None    3) Donor Serologies & Monitoring:     Donor CMV Status:   Donor HCV Status:   Donor HBcAb:   Donor HBV DERRICK: Organ record is missing.  Donor HCV DERRICK: Organ record is missing.      4) Pain Management & Bowel Regimen: Oxycodone, Colace/Dulcolax    5) Blood Pressure Management: None at this time    6) Blood Sugar Management & Follow-up: Endocrine in contact with patient and dose of glargine  increased.    7) Electrolyte Management: Sodium bicarb dc'd, Kphos still needed, counseled patient on low K diet.    8) OTHER medication follow-up (patient assistance, held medications, etc): None    9) Reinforced medication education conducted in the hospital, including medication indications, dosing, administration, side effects, monitoring-- including timing of immunosuppressant levels.     [facility]  Discussed the process for obtaining refills of medications, including verifying the dose and mailing address to have medications delivered.     Andra and her caregivers verbalized understanding and had the opportunity to ask questions.

## 2019-01-22 ENCOUNTER — LAB VISIT (OUTPATIENT)
Dept: LAB | Facility: HOSPITAL | Age: 59
End: 2019-01-22
Attending: INTERNAL MEDICINE
Payer: MEDICARE

## 2019-01-22 ENCOUNTER — TELEPHONE (OUTPATIENT)
Dept: TRANSPLANT | Facility: CLINIC | Age: 59
End: 2019-01-22

## 2019-01-22 DIAGNOSIS — Z94.0 KIDNEY REPLACED BY TRANSPLANT: ICD-10-CM

## 2019-01-22 LAB
ALBUMIN SERPL BCP-MCNC: 3.2 G/DL
ANION GAP SERPL CALC-SCNC: 8 MMOL/L
BASOPHILS # BLD AUTO: 0.02 K/UL
BASOPHILS NFR BLD: 0.3 %
BUN SERPL-MCNC: 17 MG/DL
CALCIUM SERPL-MCNC: 9 MG/DL
CHLORIDE SERPL-SCNC: 109 MMOL/L
CO2 SERPL-SCNC: 24 MMOL/L
CREAT SERPL-MCNC: 1.1 MG/DL
DIFFERENTIAL METHOD: ABNORMAL
EOSINOPHIL # BLD AUTO: 0 K/UL
EOSINOPHIL NFR BLD: 0.1 %
ERYTHROCYTE [DISTWIDTH] IN BLOOD BY AUTOMATED COUNT: 12.8 %
EST. GFR  (AFRICAN AMERICAN): >60 ML/MIN/1.73 M^2
EST. GFR  (NON AFRICAN AMERICAN): 54.7 ML/MIN/1.73 M^2
GLUCOSE SERPL-MCNC: 173 MG/DL
HCT VFR BLD AUTO: 30.5 %
HGB BLD-MCNC: 9.8 G/DL
IMM GRANULOCYTES # BLD AUTO: 0.13 K/UL
IMM GRANULOCYTES NFR BLD AUTO: 1.7 %
LYMPHOCYTES # BLD AUTO: 0.2 K/UL
LYMPHOCYTES NFR BLD: 2 %
MAGNESIUM SERPL-MCNC: 1.5 MG/DL
MCH RBC QN AUTO: 29.6 PG
MCHC RBC AUTO-ENTMCNC: 32.1 G/DL
MCV RBC AUTO: 92 FL
MONOCYTES # BLD AUTO: 0.2 K/UL
MONOCYTES NFR BLD: 2.8 %
NEUTROPHILS # BLD AUTO: 7.1 K/UL
NEUTROPHILS NFR BLD: 93.1 %
NRBC BLD-RTO: 0 /100 WBC
PHOSPHATE SERPL-MCNC: 2.6 MG/DL
PLATELET # BLD AUTO: 196 K/UL
PMV BLD AUTO: 9.9 FL
POTASSIUM SERPL-SCNC: 5.2 MMOL/L
RBC # BLD AUTO: 3.31 M/UL
SODIUM SERPL-SCNC: 141 MMOL/L
TACROLIMUS BLD-MCNC: 20.8 NG/ML
WBC # BLD AUTO: 7.57 K/UL

## 2019-01-22 PROCEDURE — 80197 ASSAY OF TACROLIMUS: CPT

## 2019-01-22 PROCEDURE — 80069 RENAL FUNCTION PANEL: CPT

## 2019-01-22 PROCEDURE — 85025 COMPLETE CBC W/AUTO DIFF WBC: CPT

## 2019-01-22 PROCEDURE — 36415 COLL VENOUS BLD VENIPUNCTURE: CPT

## 2019-01-22 PROCEDURE — 83735 ASSAY OF MAGNESIUM: CPT

## 2019-01-22 NOTE — TELEPHONE ENCOUNTER
VORB Continue to hold Tacrolimus and repeat labs tomorrow 1/23/19.  1/22/19 labs reviewed.  ________________  Patient repeated back and voice a understanding of orders and low K diet reviewed.

## 2019-01-23 ENCOUNTER — TELEPHONE (OUTPATIENT)
Dept: TRANSPLANT | Facility: CLINIC | Age: 59
End: 2019-01-23

## 2019-01-23 ENCOUNTER — LAB VISIT (OUTPATIENT)
Dept: LAB | Facility: HOSPITAL | Age: 59
End: 2019-01-23
Attending: INTERNAL MEDICINE
Payer: MEDICARE

## 2019-01-23 ENCOUNTER — CLINICAL SUPPORT (OUTPATIENT)
Dept: DIABETES | Facility: CLINIC | Age: 59
End: 2019-01-23
Payer: MEDICARE

## 2019-01-23 DIAGNOSIS — Z94.0 KIDNEY REPLACED BY TRANSPLANT: ICD-10-CM

## 2019-01-23 LAB
ALBUMIN SERPL BCP-MCNC: 3.4 G/DL
ANION GAP SERPL CALC-SCNC: 9 MMOL/L
BASOPHILS # BLD AUTO: 0.04 K/UL
BASOPHILS NFR BLD: 0.5 %
BUN SERPL-MCNC: 19 MG/DL
CALCIUM SERPL-MCNC: 9.4 MG/DL
CHLORIDE SERPL-SCNC: 109 MMOL/L
CO2 SERPL-SCNC: 23 MMOL/L
CREAT SERPL-MCNC: 1.1 MG/DL
DIFFERENTIAL METHOD: ABNORMAL
EOSINOPHIL # BLD AUTO: 0 K/UL
EOSINOPHIL NFR BLD: 0.1 %
ERYTHROCYTE [DISTWIDTH] IN BLOOD BY AUTOMATED COUNT: 13.1 %
EST. GFR  (AFRICAN AMERICAN): >60 ML/MIN/1.73 M^2
EST. GFR  (NON AFRICAN AMERICAN): 54.7 ML/MIN/1.73 M^2
GLUCOSE SERPL-MCNC: 180 MG/DL
HCT VFR BLD AUTO: 33.9 %
HGB BLD-MCNC: 10.6 G/DL
IMM GRANULOCYTES # BLD AUTO: 0.11 K/UL
IMM GRANULOCYTES NFR BLD AUTO: 1.3 %
LYMPHOCYTES # BLD AUTO: 0.2 K/UL
LYMPHOCYTES NFR BLD: 2.1 %
MAGNESIUM SERPL-MCNC: 1.5 MG/DL
MCH RBC QN AUTO: 29.9 PG
MCHC RBC AUTO-ENTMCNC: 31.3 G/DL
MCV RBC AUTO: 96 FL
MONOCYTES # BLD AUTO: 0.3 K/UL
MONOCYTES NFR BLD: 2.9 %
NEUTROPHILS # BLD AUTO: 8.2 K/UL
NEUTROPHILS NFR BLD: 93.1 %
NRBC BLD-RTO: 0 /100 WBC
PHOSPHATE SERPL-MCNC: 2.8 MG/DL
PLATELET # BLD AUTO: 238 K/UL
PMV BLD AUTO: 9.9 FL
POTASSIUM SERPL-SCNC: 5.3 MMOL/L
RBC # BLD AUTO: 3.55 M/UL
SODIUM SERPL-SCNC: 141 MMOL/L
TACROLIMUS BLD-MCNC: 14.7 NG/ML
WBC # BLD AUTO: 8.76 K/UL

## 2019-01-23 PROCEDURE — 85025 COMPLETE CBC W/AUTO DIFF WBC: CPT

## 2019-01-23 PROCEDURE — 80197 ASSAY OF TACROLIMUS: CPT

## 2019-01-23 PROCEDURE — G0108 DIAB MANAGE TRN  PER INDIV: HCPCS | Mod: PBBFAC | Performed by: DIETITIAN, REGISTERED

## 2019-01-23 PROCEDURE — 36415 COLL VENOUS BLD VENIPUNCTURE: CPT

## 2019-01-23 PROCEDURE — 80069 RENAL FUNCTION PANEL: CPT

## 2019-01-23 PROCEDURE — 83735 ASSAY OF MAGNESIUM: CPT

## 2019-01-23 NOTE — TELEPHONE ENCOUNTER
----- Message from Maureen Cabral MD sent at 1/23/2019 11:58 AM CST -----  The following message sent to patient via [facility]:  Your kidney function looks great.  Keep focus seen on blood sugar and potassium lowering strategies.  We will let you know about your tacrolimus level when it is resulted.

## 2019-01-23 NOTE — TELEPHONE ENCOUNTER
Patient repeated back and voice a understanding of of orders. Low K diet reinforce.  Tacro level is still pending.

## 2019-01-24 ENCOUNTER — TELEPHONE (OUTPATIENT)
Dept: TRANSPLANT | Facility: CLINIC | Age: 59
End: 2019-01-24

## 2019-01-24 ENCOUNTER — LAB VISIT (OUTPATIENT)
Dept: LAB | Facility: HOSPITAL | Age: 59
End: 2019-01-24
Attending: INTERNAL MEDICINE
Payer: MEDICARE

## 2019-01-24 DIAGNOSIS — Z94.0 KIDNEY REPLACED BY TRANSPLANT: ICD-10-CM

## 2019-01-24 DIAGNOSIS — Z94.0 KIDNEY REPLACED BY TRANSPLANT: Primary | ICD-10-CM

## 2019-01-24 DIAGNOSIS — Z94.0 STATUS POST LIVING-DONOR KIDNEY TRANSPLANTATION: ICD-10-CM

## 2019-01-24 DIAGNOSIS — Z00.6 RESEARCH STUDY PATIENT: ICD-10-CM

## 2019-01-24 DIAGNOSIS — Z00.6 RESEARCH STUDY PATIENT: Primary | ICD-10-CM

## 2019-01-24 LAB
ALBUMIN SERPL BCP-MCNC: 3.5 G/DL
ANION GAP SERPL CALC-SCNC: 8 MMOL/L
BASOPHILS # BLD AUTO: 0.04 K/UL
BASOPHILS NFR BLD: 0.4 %
BUN SERPL-MCNC: 19 MG/DL
CALCIUM SERPL-MCNC: 9.4 MG/DL
CHLORIDE SERPL-SCNC: 106 MMOL/L
CO2 SERPL-SCNC: 24 MMOL/L
CREAT SERPL-MCNC: 1.2 MG/DL
DIFFERENTIAL METHOD: ABNORMAL
EOSINOPHIL # BLD AUTO: 0 K/UL
EOSINOPHIL NFR BLD: 0.1 %
ERYTHROCYTE [DISTWIDTH] IN BLOOD BY AUTOMATED COUNT: 13.3 %
EST. GFR  (AFRICAN AMERICAN): 56.8 ML/MIN/1.73 M^2
EST. GFR  (NON AFRICAN AMERICAN): 49.2 ML/MIN/1.73 M^2
GLUCOSE SERPL-MCNC: 180 MG/DL
HCT VFR BLD AUTO: 34.9 %
HGB BLD-MCNC: 11.3 G/DL
IMM GRANULOCYTES # BLD AUTO: 0.11 K/UL
IMM GRANULOCYTES NFR BLD AUTO: 1.1 %
LYMPHOCYTES # BLD AUTO: 0.2 K/UL
LYMPHOCYTES NFR BLD: 1.7 %
MAGNESIUM SERPL-MCNC: 1.5 MG/DL
MCH RBC QN AUTO: 30.3 PG
MCHC RBC AUTO-ENTMCNC: 32.4 G/DL
MCV RBC AUTO: 94 FL
MONOCYTES # BLD AUTO: 0.3 K/UL
MONOCYTES NFR BLD: 2.9 %
NEUTROPHILS # BLD AUTO: 9.3 K/UL
NEUTROPHILS NFR BLD: 93.8 %
NRBC BLD-RTO: 0 /100 WBC
PHOSPHATE SERPL-MCNC: 2.8 MG/DL
PLATELET # BLD AUTO: 259 K/UL
PMV BLD AUTO: 9.4 FL
POTASSIUM SERPL-SCNC: 5.9 MMOL/L
RBC # BLD AUTO: 3.73 M/UL
SODIUM SERPL-SCNC: 138 MMOL/L
TACROLIMUS BLD-MCNC: 10.7 NG/ML
WBC # BLD AUTO: 9.93 K/UL

## 2019-01-24 PROCEDURE — 80197 ASSAY OF TACROLIMUS: CPT

## 2019-01-24 PROCEDURE — 80069 RENAL FUNCTION PANEL: CPT

## 2019-01-24 PROCEDURE — 83735 ASSAY OF MAGNESIUM: CPT

## 2019-01-24 PROCEDURE — 36415 COLL VENOUS BLD VENIPUNCTURE: CPT

## 2019-01-24 PROCEDURE — 85025 COMPLETE CBC W/AUTO DIFF WBC: CPT

## 2019-01-24 RX ORDER — TACROLIMUS 1 MG/1
1 CAPSULE ORAL EVERY 12 HOURS
Qty: 60 CAPSULE | Refills: 11 | Status: SHIPPED | OUTPATIENT
Start: 2019-01-24 | End: 2019-01-26

## 2019-01-24 NOTE — TELEPHONE ENCOUNTER
VORB Kayexalate 30gms PO Now and repeat in 4 hours.  Stop Bactrim.  1/24/19 K reviewed.  ______________  Patient repeated back and voice a understanding of orders.  Low K diet reviewed.  RX called into [facility] pharmacy.  Next labs tomorrow 1/25/19.

## 2019-01-24 NOTE — PROGRESS NOTES
Potassium already reviewed by Dr. Mercado. Prograf improved --> have her restart Prograf at 1 mg BID starting this evening.

## 2019-01-24 NOTE — TELEPHONE ENCOUNTER
----- Message from Anahy Weiss MD sent at 1/24/2019  2:02 PM CST -----  Potassium already reviewed by Dr. Mercado. Prograf improved --> have her restart Prograf at 1 mg BID starting this evening.

## 2019-01-25 ENCOUNTER — TELEPHONE (OUTPATIENT)
Dept: TRANSPLANT | Facility: CLINIC | Age: 59
End: 2019-01-25

## 2019-01-25 ENCOUNTER — LAB VISIT (OUTPATIENT)
Dept: LAB | Facility: HOSPITAL | Age: 59
End: 2019-01-25
Attending: INTERNAL MEDICINE
Payer: MEDICARE

## 2019-01-25 ENCOUNTER — RESEARCH ENCOUNTER (OUTPATIENT)
Dept: RESEARCH | Facility: HOSPITAL | Age: 59
End: 2019-01-25
Payer: MEDICARE

## 2019-01-25 DIAGNOSIS — Z94.0 STATUS POST LIVING-DONOR KIDNEY TRANSPLANTATION: ICD-10-CM

## 2019-01-25 DIAGNOSIS — Z00.6 RESEARCH STUDY PATIENT: ICD-10-CM

## 2019-01-25 DIAGNOSIS — E83.39 HYPOPHOSPHATEMIA: Primary | ICD-10-CM

## 2019-01-25 DIAGNOSIS — Z94.0 KIDNEY REPLACED BY TRANSPLANT: ICD-10-CM

## 2019-01-25 DIAGNOSIS — E83.42 HYPOMAGNESEMIA: ICD-10-CM

## 2019-01-25 LAB
ALBUMIN SERPL BCP-MCNC: 3.5 G/DL
ANION GAP SERPL CALC-SCNC: 9 MMOL/L
BASOPHILS # BLD AUTO: 0.04 K/UL
BASOPHILS NFR BLD: 0.4 %
BUN SERPL-MCNC: 16 MG/DL
CALCIUM SERPL-MCNC: 9.1 MG/DL
CHLORIDE SERPL-SCNC: 105 MMOL/L
CO2 SERPL-SCNC: 27 MMOL/L
CREAT SERPL-MCNC: 1.2 MG/DL
DIFFERENTIAL METHOD: ABNORMAL
DRUG STUDY SPECIMEN TYPE: NORMAL
DRUG STUDY TEST NAME: NORMAL
DRUG STUDY TEST RESULT: NORMAL
EOSINOPHIL # BLD AUTO: 0 K/UL
EOSINOPHIL NFR BLD: 0.1 %
ERYTHROCYTE [DISTWIDTH] IN BLOOD BY AUTOMATED COUNT: 13.4 %
EST. GFR  (AFRICAN AMERICAN): 56.8 ML/MIN/1.73 M^2
EST. GFR  (NON AFRICAN AMERICAN): 49.2 ML/MIN/1.73 M^2
GLUCOSE SERPL-MCNC: 189 MG/DL
HCT VFR BLD AUTO: 33.5 %
HGB BLD-MCNC: 10.9 G/DL
IMM GRANULOCYTES # BLD AUTO: 0.1 K/UL
IMM GRANULOCYTES NFR BLD AUTO: 1 %
LYMPHOCYTES # BLD AUTO: 0.1 K/UL
LYMPHOCYTES NFR BLD: 1.4 %
MAGNESIUM SERPL-MCNC: 1.3 MG/DL
MCH RBC QN AUTO: 30.7 PG
MCHC RBC AUTO-ENTMCNC: 32.5 G/DL
MCV RBC AUTO: 94 FL
MONOCYTES # BLD AUTO: 0.3 K/UL
MONOCYTES NFR BLD: 2.8 %
NEUTROPHILS # BLD AUTO: 9.4 K/UL
NEUTROPHILS NFR BLD: 94.3 %
NRBC BLD-RTO: 0 /100 WBC
PHOSPHATE SERPL-MCNC: 3.3 MG/DL
PLATELET # BLD AUTO: 267 K/UL
PMV BLD AUTO: 9.6 FL
POTASSIUM SERPL-SCNC: 4.8 MMOL/L
RBC # BLD AUTO: 3.55 M/UL
SODIUM SERPL-SCNC: 141 MMOL/L
TACROLIMUS BLD-MCNC: 9.6 NG/ML
WBC # BLD AUTO: 9.99 K/UL

## 2019-01-25 PROCEDURE — 36415 COLL VENOUS BLD VENIPUNCTURE: CPT

## 2019-01-25 PROCEDURE — 99000 SPECIMEN HANDLING OFFICE-LAB: CPT

## 2019-01-25 PROCEDURE — 85025 COMPLETE CBC W/AUTO DIFF WBC: CPT

## 2019-01-25 PROCEDURE — 82960 TEST FOR G6PD ENZYME: CPT

## 2019-01-25 PROCEDURE — 80197 ASSAY OF TACROLIMUS: CPT

## 2019-01-25 PROCEDURE — 80069 RENAL FUNCTION PANEL: CPT

## 2019-01-25 PROCEDURE — 83735 ASSAY OF MAGNESIUM: CPT

## 2019-01-25 RX ORDER — LANOLIN ALCOHOL/MO/W.PET/CERES
800 CREAM (GRAM) TOPICAL DAILY
Qty: 60 TABLET | Refills: 11 | Status: SHIPPED | OUTPATIENT
Start: 2019-01-25 | End: 2019-10-28 | Stop reason: ALTCHOICE

## 2019-01-25 NOTE — TELEPHONE ENCOUNTER
----- Message from Maureen Cabral MD sent at 1/25/2019  4:39 PM CST -----  The following message sent to patient via [facility]: Start mag oxide 800 mg daily, and take apart from Kphos neutral. Also lower Kphos to 250 mg twice daily. Continue tacrolimus 1 mg twice daily and repeat tacro level Saturday (not need for other labs) to ensure it is not climbing more.  [Dr. Vasquez notified that she restarted 1 mg BID 1/25 PM, but was held  Prior to that since 1/21 PM dose.]

## 2019-01-25 NOTE — TELEPHONE ENCOUNTER
Patient and partner mary repeated back and voice a understanding of orders.high mag diet reviewed.  Plan repeat Tacro tomorrow 1/26/19 and routine labs on Monday 1/28/19

## 2019-01-26 ENCOUNTER — LAB VISIT (OUTPATIENT)
Dept: LAB | Facility: HOSPITAL | Age: 59
End: 2019-01-26
Attending: INTERNAL MEDICINE
Payer: MEDICARE

## 2019-01-26 DIAGNOSIS — Z94.0 KIDNEY REPLACED BY TRANSPLANT: ICD-10-CM

## 2019-01-26 LAB — TACROLIMUS BLD-MCNC: 8.2 NG/ML

## 2019-01-26 PROCEDURE — 36415 COLL VENOUS BLD VENIPUNCTURE: CPT

## 2019-01-26 PROCEDURE — 80197 ASSAY OF TACROLIMUS: CPT

## 2019-01-26 RX ORDER — TACROLIMUS 1 MG/1
CAPSULE ORAL
Qty: 90 CAPSULE | Refills: 11
Start: 2019-01-26 | End: 2019-01-31 | Stop reason: SDUPTHER

## 2019-01-26 NOTE — PROGRESS NOTES
Tac level is still at target but dropping on 1 mg BID. Please increase prograf to 2/1 mg and check the level on Monday

## 2019-01-26 NOTE — TELEPHONE ENCOUNTER
Confirmed medication change with pt. Pt advised to increase Prograf to 2 mg every morning and 1 mg nightly, repeat labs on Monday, 1/28/19. Pt repeated change back to this coordinator and verbalized understanding.    ----- Message from July Vasquez MD sent at 1/26/2019 11:35 AM CST -----  Tac level is still at target but dropping on 1 mg BID. Please increase prograf to 2/1 mg and check the level on Monday

## 2019-01-26 NOTE — PROGRESS NOTES
[facility] CMV Prevention (Kidney Transplant)  Protocol: [id number]  IRB# [id number]  PI: Nicholas GUAN)  Site: [id number]  Subject #[id number]     VISIT 3/ WEEK 1:       Date of Visit: 25/JAN/2019     Patient arrived at clinic to complete his V3-Wk1 visit. Protocol Labs were drawn in morning at 08:25 AM before study medication was taken from current study medication bottles. All specimens processed and shipped per protocol. Byron SARAVIA conducted visti in its entirety with Sub-I, Sharri Sands present.. Patient agrees to continue his participation in the study and all questions answered. No AE's or health resource utilization occurred since last visit.     Diary collected and reviewed with subject. Copies made of diary and placed in subject binder. Pill bottles were brought to appointment.      Pill count:  INV Acyclovir/placebo: 9  INV Valganciclovir/placebo: 18  INV MK-8228/placebo: 18      Re-education on dosing schedule, completion of diary, and other reminders given to patient.     Vital signs obtained sitting:  BP: 131/75  HR: 76  RR: 16  Wt: 99.2 kg  T: 98.4 F oral     CMV Disease Assessment, Health Outcomes Assessment, Renal Transplant Outcomes, and Child Pierre Score completed. Patient agreed to continue to follow compliance in regards to effective birth control and donation of eggs/sperm as required by protocol.      Follow-up in 1 week for Week 2 visit. Informed to bring back bottles, old study medication, and dairy. Routine instructions on taking study medications and restrictions provided.

## 2019-01-27 ENCOUNTER — NURSE TRIAGE (OUTPATIENT)
Dept: ADMINISTRATIVE | Facility: CLINIC | Age: 59
End: 2019-01-27

## 2019-01-27 NOTE — PROGRESS NOTES
Kidney Post-Transplant Assessment    Referring Physician: Dalton Heaton  Current Nephrologist: Dalton Heaton    ORGAN: LEFT KIDNEY  Donor Type: living  PHS Increased Risk: no  Cold Ischemia: 37 mins  Induction Medications: campath - alemtuzumab (anti-cd52)    Subjective:     CC:  Reassessment of renal allograft function and management of chronic immunosuppression.    HPI:  Ms. Newell is a 58 y.o. year old White female who received a living kidney transplant on 1/14/19. Her most recent creatinine is 1.1. She takes mycophenolate mofetil and tacrolimus for maintenance immunosuppression. Her post transplant course has been uncomplicated to date.    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 She briefly required HD 4/13/18 until ~Sept 2018.  she was predialysis at the time of transplant.   -LURD KT (friend) 01/14/2019,  Induced with Campath.  CMV  Mismatch -study participant.   01/24/2019 hyperkalemia, Bactrim discontinued    PCP PROPHYLAXIS: Until 1/14/19;  Bactrim stopped 01/24/2019 D/T hiK  CMV PROPHYLAXIS: Valcyte until 7/14/19  FUNGAL PROPHYLAXIS: None    Andra feels well today and relays no concerns. She reports having sero-sanguinous drainage from her incision, requiring her to change the dressing once daily. She is not requiring any pain meds.  She reports she is walking without any limitations.  She is tolerating her diet well, and having no adverse effects from the medications.  At this time, she has no voiding complaints either.  It is noted her urinalysis had many white blood cells, but she reports no dysuria, frequency or urgency.  No fever or chills reported either.    Review of Systems   Constitutional: Negative for fever.   HENT: Negative for mouth sores.    Eyes: Negative for visual disturbance.   Respiratory: Negative for shortness of breath.    Cardiovascular: Negative for chest pain and leg swelling.   Gastrointestinal:  "Negative.    Genitourinary: Negative for decreased urine volume, difficulty urinating, dysuria and hematuria.   Musculoskeletal: Negative.    Skin: Positive for wound (with some lt red drainage). Negative for rash.   Allergic/Immunologic: Positive for immunocompromised state.   Neurological: Negative for tremors.     Objective:   Blood pressure 136/73, pulse 89, temperature 99.1 °F (37.3 °C), temperature source Oral, resp. rate 18, height 5' 7" (1.702 m), weight 99.7 kg (219 lb 12.8 oz), last menstrual period 02/28/2012, SpO2 97 %.body mass index is 34.43 kg/m².    Physical Exam   Constitutional: No distress.   Cardiovascular: Normal rate and regular rhythm.   Pulmonary/Chest: Effort normal and breath sounds normal. No respiratory distress.   Abdominal: Soft. Bowel sounds are normal. She exhibits no mass. There is no tenderness.   Musculoskeletal: She exhibits no edema.   Skin: Skin is warm and dry.   Our LQ incision with staples, intact. Scant serosanguineous drainage noted between staples in the upper 3rd of the incision.  No purulence, erythema, TTP.  Some ecchymoses noted but no induration.   Psychiatric: She has a normal mood and affect.     Labs:  Lab Results   Component Value Date    WBC 6.58 01/28/2019    HGB 9.6 (L) 01/28/2019    HCT 30.2 (L) 01/28/2019     01/28/2019    K 5.0 01/28/2019     01/28/2019    CO2 28 01/28/2019    BUN 17 01/28/2019    CREATININE 1.1 01/28/2019    EGFRNONAA 54.7 (A) 01/28/2019    CALCIUM 9.3 01/28/2019    PHOS 2.8 01/28/2019    MG 1.6 01/28/2019    ALBUMIN 3.2 (L) 01/28/2019    AST 31 01/15/2019    ALT 19 01/15/2019    UTPCR 0.60 (H) 01/14/2019    .0 (H) 01/14/2019    TACROLIMUS 8.8 01/28/2019     No results found for: EXTANC, EXTWBC, EXTSEGS, EXTPLATELETS, EXTHEMOGLOBI, EXTHEMATOCRI, EXTCREATININ, EXTSODIUM, EXTPOTASSIUM, EXTBUN, EXTCO2, EXTCALCIUM, EXTPHOSPHORU, EXTGLUCOSE, EXTALBUMIN, EXTAST, EXTALT, EXTBILITOTAL, EXTLIPASE, EXTAMYLASE    No results found " for: EXTCYCLOSLVL, EXTSIROLIMUS, EXTTACROLVL, EXTPROTCRE, EXTPTHINTACT, EXTPROTEINUA, EXTWBCUA, EXTRBCUA    Labs were reviewed with the patient    Assessment:     1. Status post living-donor kidney transplantation    2. Hypophosphatemia    3. At risk for opportunistic infections    4. Metabolic acidosis    5. Long-term use of immunosuppressant medication    6. Prophylactic immunotherapy        Plan:   -Doing well overall 14d status post living donor kidney transplant with excellent allograft function.  -Continue tacrolimus-based immunosuppression.  Will continue to watch for signs, symptoms and levels from side effects or drug toxicity.    -Hyperkalemia -given Kayexalate last week.  Reviewed low-potassium diet with dietitian.  No need for chronic medication therapy at present.  -Hypophosphatemia on KPN 1 tab b.i.d. phosphorus has corrected nicely.  Thus, KP and will be stopped today.  -Hypomagnesemia- magnesium oxide 800 mg daily; continue current dose and watch levels. Will tolerate mild asymptomatic low level d/t pill burden, DDI, and adverse SE   -OI prophylaxis:     -Bactrim d/c'd d/t hyperkalemia, start dapsone today since G6 PD acceptable.     -Continue Valcyte study medication for CMV prophylaxis.  -Scant drainage from incision noted.  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  We will watch closely for infection, though there is no evidence of infection at present.  -h/o mood disorder:  She notes some mood swings, and advised it is appropriate for her to use her lorazepam as needed.  She continues to take her Viibryd faithfully.    Follow-up:   Clinic: return to transplant clinic weekly for the first month after transplant; every 2 weeks during months 2-3; then at 6-, 9-, 12-, 18-, 24-, and 36- months post-transplant to reassess for complications from immunosuppression toxicity and monitor for rejection.  Annually thereafter.    Labs: since patient remains at high risk for rejection and drug-related  complications that warrant close monitoring, labs will be ordered as follows: continue twice weekly CBC, renal panel, and drug level for first month; then same labs once weekly through 3rd month post-transplant.  Urine for UA and protein/creatinine ratio monthly.  Urine BK - PCR at 1-, 3-, 6-, 9-, 12-, 18-, 24-, and 36- months post-transplant.  Hepatic panel at 1-, 2-, 3-, 6-, 9-, 12-, 18-, 24-, and 36- months post-transplant.    Maureen Cabral MD       Education:   Material provided to the patient.  Patient reminded to call with any health changes since these can be early signs of significant complications.  Also, I advised the patient to be sure any new medications or changes of old medications are discussed with either a pharmacist or physician knowledgeable with transplant to avoid rejection/drug toxicity related to significant drug interactions.

## 2019-01-28 ENCOUNTER — OFFICE VISIT (OUTPATIENT)
Dept: TRANSPLANT | Facility: CLINIC | Age: 59
End: 2019-01-28
Payer: MEDICARE

## 2019-01-28 ENCOUNTER — LAB VISIT (OUTPATIENT)
Dept: LAB | Facility: HOSPITAL | Age: 59
End: 2019-01-28
Attending: INTERNAL MEDICINE
Payer: MEDICARE

## 2019-01-28 VITALS
BODY MASS INDEX: 34.5 KG/M2 | RESPIRATION RATE: 18 BRPM | TEMPERATURE: 99 F | HEART RATE: 89 BPM | SYSTOLIC BLOOD PRESSURE: 136 MMHG | DIASTOLIC BLOOD PRESSURE: 73 MMHG | OXYGEN SATURATION: 97 % | WEIGHT: 219.81 LBS | HEIGHT: 67 IN

## 2019-01-28 DIAGNOSIS — N30.01 ACUTE CYSTITIS WITH HEMATURIA: Primary | ICD-10-CM

## 2019-01-28 DIAGNOSIS — Z29.89 PROPHYLACTIC IMMUNOTHERAPY: ICD-10-CM

## 2019-01-28 DIAGNOSIS — Z00.6 RESEARCH STUDY PATIENT: Primary | ICD-10-CM

## 2019-01-28 DIAGNOSIS — E83.39 HYPOPHOSPHATEMIA: ICD-10-CM

## 2019-01-28 DIAGNOSIS — Z94.0 STATUS POST LIVING-DONOR KIDNEY TRANSPLANTATION: ICD-10-CM

## 2019-01-28 DIAGNOSIS — Z91.89 AT RISK FOR OPPORTUNISTIC INFECTIONS: ICD-10-CM

## 2019-01-28 DIAGNOSIS — Z94.0 STATUS POST LIVING-DONOR KIDNEY TRANSPLANTATION: Primary | ICD-10-CM

## 2019-01-28 DIAGNOSIS — E87.20 METABOLIC ACIDOSIS: ICD-10-CM

## 2019-01-28 DIAGNOSIS — Z79.60 LONG-TERM USE OF IMMUNOSUPPRESSANT MEDICATION: ICD-10-CM

## 2019-01-28 DIAGNOSIS — Z94.0 KIDNEY REPLACED BY TRANSPLANT: ICD-10-CM

## 2019-01-28 PROBLEM — B78.0: Status: RESOLVED | Noted: 2017-08-03 | Resolved: 2019-01-28

## 2019-01-28 LAB
ALBUMIN SERPL BCP-MCNC: 3.2 G/DL
ANION GAP SERPL CALC-SCNC: 6 MMOL/L
BASOPHILS # BLD AUTO: 0.03 K/UL
BASOPHILS NFR BLD: 0.5 %
BUN SERPL-MCNC: 17 MG/DL
CALCIUM SERPL-MCNC: 9.3 MG/DL
CHLORIDE SERPL-SCNC: 108 MMOL/L
CO2 SERPL-SCNC: 28 MMOL/L
CREAT SERPL-MCNC: 1.1 MG/DL
DIFFERENTIAL METHOD: ABNORMAL
EOSINOPHIL # BLD AUTO: 0 K/UL
EOSINOPHIL NFR BLD: 0.3 %
ERYTHROCYTE [DISTWIDTH] IN BLOOD BY AUTOMATED COUNT: 14 %
EST. GFR  (AFRICAN AMERICAN): >60 ML/MIN/1.73 M^2
EST. GFR  (NON AFRICAN AMERICAN): 54.7 ML/MIN/1.73 M^2
GLUCOSE SERPL-MCNC: 184 MG/DL
GLUCOSE-6-PHOSPHATE DEHYDROGENASE QUAL: NORMAL
HCT VFR BLD AUTO: 30.2 %
HGB BLD-MCNC: 9.6 G/DL
IMM GRANULOCYTES # BLD AUTO: 0.04 K/UL
IMM GRANULOCYTES NFR BLD AUTO: 0.6 %
LYMPHOCYTES # BLD AUTO: 0.1 K/UL
LYMPHOCYTES NFR BLD: 1.7 %
MAGNESIUM SERPL-MCNC: 1.6 MG/DL
MCH RBC QN AUTO: 29.9 PG
MCHC RBC AUTO-ENTMCNC: 31.8 G/DL
MCV RBC AUTO: 94 FL
MONOCYTES # BLD AUTO: 0.3 K/UL
MONOCYTES NFR BLD: 4.9 %
NEUTROPHILS # BLD AUTO: 6.1 K/UL
NEUTROPHILS NFR BLD: 92 %
NRBC BLD-RTO: 0 /100 WBC
PHOSPHATE SERPL-MCNC: 2.8 MG/DL
PLATELET # BLD AUTO: 246 K/UL
PMV BLD AUTO: 9.6 FL
POTASSIUM SERPL-SCNC: 5 MMOL/L
RBC # BLD AUTO: 3.21 M/UL
SODIUM SERPL-SCNC: 142 MMOL/L
TACROLIMUS BLD-MCNC: 8.8 NG/ML
WBC # BLD AUTO: 6.58 K/UL

## 2019-01-28 PROCEDURE — 99499 RISK ADDL DX/OHS AUDIT: ICD-10-PCS | Mod: S$PBB,,, | Performed by: INTERNAL MEDICINE

## 2019-01-28 PROCEDURE — 99999 PR PBB SHADOW E&M-EST. PATIENT-LVL III: ICD-10-PCS | Mod: PBBFAC,,, | Performed by: INTERNAL MEDICINE

## 2019-01-28 PROCEDURE — 80197 ASSAY OF TACROLIMUS: CPT

## 2019-01-28 PROCEDURE — 99215 PR OFFICE/OUTPT VISIT, EST, LEVL V, 40-54 MIN: ICD-10-PCS | Mod: S$PBB,,, | Performed by: INTERNAL MEDICINE

## 2019-01-28 PROCEDURE — 99499 UNLISTED E&M SERVICE: CPT | Mod: S$PBB,,, | Performed by: INTERNAL MEDICINE

## 2019-01-28 PROCEDURE — 85025 COMPLETE CBC W/AUTO DIFF WBC: CPT

## 2019-01-28 PROCEDURE — 36415 COLL VENOUS BLD VENIPUNCTURE: CPT

## 2019-01-28 PROCEDURE — 83735 ASSAY OF MAGNESIUM: CPT

## 2019-01-28 PROCEDURE — 99999 PR PBB SHADOW E&M-EST. PATIENT-LVL III: CPT | Mod: PBBFAC,,, | Performed by: INTERNAL MEDICINE

## 2019-01-28 PROCEDURE — 99215 OFFICE O/P EST HI 40 MIN: CPT | Mod: S$PBB,,, | Performed by: INTERNAL MEDICINE

## 2019-01-28 PROCEDURE — 80069 RENAL FUNCTION PANEL: CPT

## 2019-01-28 PROCEDURE — 99213 OFFICE O/P EST LOW 20 MIN: CPT | Mod: PBBFAC | Performed by: INTERNAL MEDICINE

## 2019-01-28 RX ORDER — DAPSONE 100 MG/1
100 TABLET ORAL DAILY
Qty: 30 TABLET | Refills: 11 | Status: ON HOLD | OUTPATIENT
Start: 2019-01-28 | End: 2019-02-03 | Stop reason: ALTCHOICE

## 2019-01-28 NOTE — TELEPHONE ENCOUNTER
Reason for Disposition   [1] DOUBLE DOSE (an extra dose or lesser amount) of prescription drug AND [2] NO symptoms (Exception: a double dose of antibiotics)    Protocols used: ST MEDICATION QUESTION CALL-A-  Kidney 1/14/2019  BPA 16  CC: spouse called re rx. Told to  meds yest. Mag ox and the other was phospha 250 neutral - started last pm. getting meds ready. Has been on phospha neutral. Taken two does last pm and this am, hasnt taken evening dose. Spoke with dr east - take one single dose this evening. call coord in am. family notified. call back with questions.

## 2019-01-28 NOTE — TELEPHONE ENCOUNTER
"  Reason for Disposition   [1] Blood glucose > 300 mg/dl (16.5 mmol/l) AND [2] two or more times in a row    Answer Assessment - Initial Assessment Questions  1. BLOOD GLUCOSE: "What is your blood glucose level?"      293  2. ONSET: "When did you check the blood glucose?"     945pm   3. USUAL RANGE: "What is your glucose level usually?" (e.g., usual fasting morning value, usual evening value)     174 est 170-180s  4. KETONES: "Do you check for ketones (urine or blood test strips)?" If yes, ask: "What does the test show now?"      N/a   5. TYPE 1 or 2:  "Do you know what type of diabetes you have?"  (e.g., Type 1, Type 2, Gestational; doesn't know)       2  6. INSULIN: "Do you take insulin?" If yes, ask: "Have you missed any shots recently?"     basaglar evening, took januvia 100 mg pill in am   basaglar 9pm   7. DIABETES PILLS: "Do you take any pills for your diabetes?" If yes, ask: "Have you missed taking any pills recently?"     Yes   8. OTHER SYMPTOMS: "Do you have any symptoms?" (e.g., fever, frequent urination, difficulty breathing, dizziness, weakness, vomiting)    No    Protocols used:  DIABETES - HIGH BLOOD SUGAR-A-AH  Kidney 1/14/2019  BPA 16   CC: family called re BS= 170 bkft and lunch and dinner. Just checked .  Rechecked now 293. Had small piece of hard candy at about 6pm. No steroids , no nausea, not weak. On 20 units basaglar just took it. spoke with dr east - rec water one cup q 1 hour x 4 hours. Call back with questions.   "

## 2019-01-28 NOTE — LETTER
January 28, 2019        Dalton Heaton  [address] [facility] [address]  [place] [address]  Phone: [phone]  Fax: [phone]             [facility]  [address]  [facility] [address]  Phone: [phone]   Patient: Andra Newell   MR Number: [id number]   YOB: 1960   Date of Visit: 1/28/2019       Dear Dr. Dalton Heaton    Thank you for referring Andra Newell to me for evaluation. Attached you will find relevant portions of my assessment and plan of care.    If you have questions, please do not hesitate to call me. I look forward to following Andra Newell along with you.    Sincerely,    Maureen Cabral MD    Enclosure    If you would like to receive this communication electronically, please contact [email or web address] or [phone] to request [employer] access.    [employer] is a tool which provides read-only access to select patient information with whom you have a relationship. Its easy to use and provides real time access to review your patients record including encounter summaries, notes, results, and demographic information.    If you feel you have received this communication in error or would no longer like to receive these types of communications, please e-mail [email or web address]

## 2019-01-28 NOTE — PROGRESS NOTES
"OPAL post clinic note:  SW met with pt for first post clinic SW visit. Pt was transplanted on 1/14/2019 from living donor. Pt reports that they are staying at the  apartments. Pt asked SW "[facility] and have me sign a waiver saying that I won't london them if I get lead poisoning?" OPAL asked for further clarification and pt states "LR has you sign a waiver saying you won't london them because I guess lead was either used in the paint at one time or the pipes are lead. I am so worried about everything that I won't drink the water". SW validated pt's feeling and expressed that this is the first time this writer has heard of this information and encouraged her to ask the doctor. Pt reports that they are doing well and in good spirits.     Pt inquired about possibly going home soon since she lives in [place], [place]. SW encouraged pt to ask her doctor today and if they can to please drop the keys off with the  at .    Pt presents with partner. Pt has no issues with transportation, caregivers, or affording medication at this time.     "

## 2019-01-30 ENCOUNTER — LAB VISIT (OUTPATIENT)
Dept: LAB | Facility: HOSPITAL | Age: 59
End: 2019-01-30
Attending: INTERNAL MEDICINE
Payer: MEDICARE

## 2019-01-30 ENCOUNTER — TELEPHONE (OUTPATIENT)
Dept: ENDOCRINOLOGY | Facility: CLINIC | Age: 59
End: 2019-01-30

## 2019-01-30 DIAGNOSIS — Z00.6 RESEARCH STUDY PATIENT: ICD-10-CM

## 2019-01-30 DIAGNOSIS — Z94.0 STATUS POST LIVING-DONOR KIDNEY TRANSPLANTATION: ICD-10-CM

## 2019-01-30 DIAGNOSIS — Z94.0 KIDNEY REPLACED BY TRANSPLANT: ICD-10-CM

## 2019-01-30 LAB
ALBUMIN SERPL BCP-MCNC: 3.4 G/DL
ANION GAP SERPL CALC-SCNC: 8 MMOL/L
BASOPHILS # BLD AUTO: 0.03 K/UL
BASOPHILS NFR BLD: 0.7 %
BUN SERPL-MCNC: 21 MG/DL
CALCIUM SERPL-MCNC: 9.7 MG/DL
CHLORIDE SERPL-SCNC: 105 MMOL/L
CO2 SERPL-SCNC: 25 MMOL/L
CREAT SERPL-MCNC: 1.3 MG/DL
DIFFERENTIAL METHOD: ABNORMAL
DRUG STUDY SPECIMEN TYPE: NORMAL
EOSINOPHIL # BLD AUTO: 0.1 K/UL
EOSINOPHIL NFR BLD: 1.1 %
ERYTHROCYTE [DISTWIDTH] IN BLOOD BY AUTOMATED COUNT: 13.7 %
EST. GFR  (AFRICAN AMERICAN): 51.5 ML/MIN/1.73 M^2
EST. GFR  (NON AFRICAN AMERICAN): 44.7 ML/MIN/1.73 M^2
GLUCOSE SERPL-MCNC: 223 MG/DL
HCT VFR BLD AUTO: 30.3 %
HGB BLD-MCNC: 9.5 G/DL
IMM GRANULOCYTES # BLD AUTO: 0.01 K/UL
IMM GRANULOCYTES NFR BLD AUTO: 0.2 %
LYMPHOCYTES # BLD AUTO: 0.1 K/UL
LYMPHOCYTES NFR BLD: 2.7 %
MAGNESIUM SERPL-MCNC: 1.6 MG/DL
MCH RBC QN AUTO: 30.2 PG
MCHC RBC AUTO-ENTMCNC: 31.4 G/DL
MCV RBC AUTO: 96 FL
MONOCYTES # BLD AUTO: 0.5 K/UL
MONOCYTES NFR BLD: 10.5 %
NEUTROPHILS # BLD AUTO: 3.8 K/UL
NEUTROPHILS NFR BLD: 84.8 %
NRBC BLD-RTO: 0 /100 WBC
PHOSPHATE SERPL-MCNC: 2.7 MG/DL
PLATELET # BLD AUTO: 220 K/UL
PMV BLD AUTO: 9.3 FL
POTASSIUM SERPL-SCNC: 5.2 MMOL/L
RBC # BLD AUTO: 3.15 M/UL
SODIUM SERPL-SCNC: 138 MMOL/L
TACROLIMUS BLD-MCNC: 9.7 NG/ML
WBC # BLD AUTO: 4.46 K/UL

## 2019-01-30 PROCEDURE — 36415 COLL VENOUS BLD VENIPUNCTURE: CPT

## 2019-01-30 PROCEDURE — 83735 ASSAY OF MAGNESIUM: CPT

## 2019-01-30 PROCEDURE — 80069 RENAL FUNCTION PANEL: CPT

## 2019-01-30 PROCEDURE — 80197 ASSAY OF TACROLIMUS: CPT

## 2019-01-30 PROCEDURE — 85025 COMPLETE CBC W/AUTO DIFF WBC: CPT

## 2019-01-30 NOTE — TELEPHONE ENCOUNTER
----- Message from Arely Mojica sent at 1/30/2019 12:36 PM CST -----  Contact: Self  .Needs Advice    Reason for call: Glucose over 200 reading from last few days: 194,189, 220 am now 209, possible insulin change        Communication Preference:  [phone]    Additional Information: Was Dr Mcdaniels Pt.

## 2019-01-30 NOTE — TELEPHONE ENCOUNTER
Notified to increase basalglar by 3 units per Dr Charles. Pt voiced understanding and will bring in sugar logs to next weeks visit.

## 2019-01-30 NOTE — TELEPHONE ENCOUNTER
Spoke with patient.  Transplant patient who is scheduled to see Elvira next week for follow up.  She will drop off sugar logs later today and will show to Dr Charles to see if she wants to make changes until she sees Elvira next week.

## 2019-01-31 ENCOUNTER — PATIENT MESSAGE (OUTPATIENT)
Dept: TRANSPLANT | Facility: CLINIC | Age: 59
End: 2019-01-31

## 2019-01-31 DIAGNOSIS — Z94.0 KIDNEY REPLACED BY TRANSPLANT: ICD-10-CM

## 2019-01-31 RX ORDER — TACROLIMUS 0.5 MG/1
CAPSULE ORAL
Qty: 180 CAPSULE | Refills: 11 | Status: SHIPPED | OUTPATIENT
Start: 2019-01-31 | End: 2019-02-26 | Stop reason: SDUPTHER

## 2019-01-31 NOTE — TELEPHONE ENCOUNTER
----- Message from Maureen Cabral MD sent at 1/30/2019  7:36 PM CST -----  The following message sent to patient via [facility]:   Your tacrolimus level came up just a bit, but overall is acceptable.  When possible, I would like you to change from 2/1 and take 1.5 mg twice daily.  This will require getting 0.5 mg caps.   Until you have 0.5 mg capsules, continue to take 2/1 and getting labs twice weekly. I suspect we may need to drop your dose in the future again!

## 2019-02-01 ENCOUNTER — HOSPITAL ENCOUNTER (OUTPATIENT)
Dept: CARDIOLOGY | Facility: CLINIC | Age: 59
Discharge: HOME | End: 2019-02-01
Payer: MEDICARE

## 2019-02-01 ENCOUNTER — RESEARCH ENCOUNTER (OUTPATIENT)
Dept: RESEARCH | Facility: HOSPITAL | Age: 59
End: 2019-02-01
Payer: MEDICARE

## 2019-02-01 DIAGNOSIS — Z94.0 KIDNEY TRANSPLANT RECIPIENT: Primary | ICD-10-CM

## 2019-02-01 DIAGNOSIS — Z94.0 STATUS POST LIVING-DONOR KIDNEY TRANSPLANTATION: ICD-10-CM

## 2019-02-01 DIAGNOSIS — Z00.6 RESEARCH STUDY PATIENT: ICD-10-CM

## 2019-02-01 DIAGNOSIS — Z00.6 RESEARCH SUBJECT: ICD-10-CM

## 2019-02-01 PROCEDURE — 93010 ELECTROCARDIOGRAM REPORT: CPT | Mod: S$PBB,,, | Performed by: INTERNAL MEDICINE

## 2019-02-01 PROCEDURE — 93010 EKG 12-LEAD: ICD-10-PCS | Mod: S$PBB,,, | Performed by: INTERNAL MEDICINE

## 2019-02-01 PROCEDURE — 93005 ELECTROCARDIOGRAM TRACING: CPT | Mod: PBBFAC | Performed by: INTERNAL MEDICINE

## 2019-02-01 RX ORDER — CEFPODOXIME PROXETIL 200 MG/1
200 TABLET, FILM COATED ORAL EVERY 12 HOURS
Qty: 10 TABLET | Refills: 0 | Status: ON HOLD | OUTPATIENT
Start: 2019-02-01 | End: 2019-02-03 | Stop reason: CLARIF

## 2019-02-01 NOTE — TELEPHONE ENCOUNTER
Patient repeated back and voice a understanding of orders. Patient notified that sensitivities are pending and will be notified of results by weekend on call coordinator.  Rx phone into [facility] pharmacy on [address] .

## 2019-02-01 NOTE — TELEPHONE ENCOUNTER
----- Message from Maureen Cabral MD sent at 2/1/2019 12:50 PM CST -----  The following message sent to patient via [facility]: Start cefpedoxime 200 mg twice daily x 5 days. Start cefpedoxime 200 mg twice daily x 5 days. We will check culture over weekend once final results are back to make sure this is the appropriate antibiotic.    Please plan on asking RN on call to check sensitivities and ensure this is an appropriate antibiotic.

## 2019-02-01 NOTE — PROGRESS NOTES
[facility] CMV Prevention (Kidney Transplant)  Protocol: [id number]  IRB# 2017.512  PI: Nicholas GUAN)  Site: [id number]  Subject #[id number]     VISIT 4/ WEEK 2:       Date of Visit: 01/FEB/2019     Patient arrived at clinic to complete his V4-Wk2 visit. Protocol Labs were drawn in morning at 08:18 AM before study medication was taken from current study medication bottles. All specimens processed and shipped per protocol. Byron SARAVIA conducted visti in its entirety with Sub-I, Sharri Sands present. Patient agrees to continue his participation in the study and all questions answered. No AE's or health resource utilization occurred since last visit.     Diary collected and reviewed with subject. Copies made of diary and placed in subject binder. Pill bottles were brought to appointment.      Pill count:  INV Acyclovir/placebo: 3  INV Valganciclovir/placebo: 16  INV MK-[id number]/placebo: 6     Re-education on dosing schedule, completion of diary, and other reminders given to patient.      Vital signs obtained sitting:  BP: 112/72  HR: 106  RR: 18  Wt: 99.9 kg  T: 98.5 F oral     CMV Disease Assessment, Health Outcomes Assessment, Renal Transplant Outcomes, Health Outcomes Assessment and Child Pierre Score completed. Pt agreed to continue to follow compliance in regards to effective birth control and donation of sperm as required by protocol.      Old bottles returned to research pharmacy.  re-educated the patient on the importance of bringing all bottles back into next visit. Subject was assigned new study drug bottles.       New study medication (3 bottles) dispensed to subject: [id number], [id number], [id number]. All 3 study medication taken in clinic from new bottles.     Detailed instructions to bring ALL bottles back to the site. Education and training provided on when to take study drug, and what not to eat/drink while taking study medication, etc.      Study participant reminded to call study  coordinator for any questions. Reminder to bring diary and all pill bottles back to next appointment on 15/FEB//2019.

## 2019-02-03 ENCOUNTER — HOSPITAL ENCOUNTER (INPATIENT)
Facility: HOSPITAL | Age: 59
LOS: 3 days | Discharge: ANOTHER HOSPITAL | DRG: 149 | End: 2019-02-06
Attending: EMERGENCY MEDICINE | Admitting: INTERNAL MEDICINE
Payer: MEDICARE

## 2019-02-03 ENCOUNTER — NURSE TRIAGE (OUTPATIENT)
Dept: ADMINISTRATIVE | Facility: CLINIC | Age: 59
End: 2019-02-03

## 2019-02-03 ENCOUNTER — TELEPHONE (OUTPATIENT)
Dept: TRANSPLANT | Facility: CLINIC | Age: 59
End: 2019-02-03

## 2019-02-03 DIAGNOSIS — R42 DISEQUILIBRIUM: Primary | ICD-10-CM

## 2019-02-03 DIAGNOSIS — I63.9 CVA (CEREBRAL VASCULAR ACCIDENT): ICD-10-CM

## 2019-02-03 LAB
ALBUMIN SERPL BCP-MCNC: 3.6 G/DL
ALP SERPL-CCNC: 224 U/L
ALT SERPL W/O P-5'-P-CCNC: 31 U/L
ANION GAP SERPL CALC-SCNC: 10 MMOL/L
AST SERPL-CCNC: 19 U/L
BASOPHILS # BLD AUTO: 0 K/UL
BASOPHILS NFR BLD: 0.4 %
BILIRUB SERPL-MCNC: 0.8 MG/DL
BILIRUB UR QL STRIP: NEGATIVE
BUN SERPL-MCNC: 19 MG/DL
CALCIUM SERPL-MCNC: 9.4 MG/DL
CHLORIDE SERPL-SCNC: 103 MMOL/L
CLARITY UR: CLEAR
CO2 SERPL-SCNC: 25 MMOL/L
COLOR UR: YELLOW
CREAT SERPL-MCNC: 1.2 MG/DL
DIFFERENTIAL METHOD: ABNORMAL
EOSINOPHIL # BLD AUTO: 0.1 K/UL
EOSINOPHIL NFR BLD: 2.1 %
ERYTHROCYTE [DISTWIDTH] IN BLOOD BY AUTOMATED COUNT: 14.1 %
EST. GFR  (AFRICAN AMERICAN): 57 ML/MIN/1.73 M^2
EST. GFR  (NON AFRICAN AMERICAN): 49 ML/MIN/1.73 M^2
GLUCOSE SERPL-MCNC: 244 MG/DL
GLUCOSE UR QL STRIP: NEGATIVE
HCT VFR BLD AUTO: 27.2 %
HGB BLD-MCNC: 8.9 G/DL
HGB UR QL STRIP: NEGATIVE
KETONES UR QL STRIP: NEGATIVE
LEUKOCYTE ESTERASE UR QL STRIP: NEGATIVE
LIPASE SERPL-CCNC: 106 U/L
LYMPHOCYTES # BLD AUTO: 0.1 K/UL
LYMPHOCYTES NFR BLD: 4.3 %
MAGNESIUM SERPL-MCNC: 1.6 MG/DL
MCH RBC QN AUTO: 29.6 PG
MCHC RBC AUTO-ENTMCNC: 32.6 G/DL
MCV RBC AUTO: 91 FL
MONOCYTES # BLD AUTO: 0.3 K/UL
MONOCYTES NFR BLD: 10.8 %
NEUTROPHILS # BLD AUTO: 2.6 K/UL
NEUTROPHILS NFR BLD: 82.4 %
NITRITE UR QL STRIP: NEGATIVE
PH UR STRIP: 6 [PH] (ref 5–8)
PHOSPHATE SERPL-MCNC: 2.7 MG/DL
PLATELET # BLD AUTO: 224 K/UL
PMV BLD AUTO: 7.8 FL
POCT GLUCOSE: 204 MG/DL (ref 70–110)
POTASSIUM SERPL-SCNC: 4.9 MMOL/L
PROT SERPL-MCNC: 7 G/DL
PROT UR QL STRIP: NEGATIVE
RBC # BLD AUTO: 3 M/UL
SODIUM SERPL-SCNC: 138 MMOL/L
SP GR UR STRIP: <=1.005 (ref 1–1.03)
URN SPEC COLLECT METH UR: ABNORMAL
UROBILINOGEN UR STRIP-ACNC: NEGATIVE EU/DL
WBC # BLD AUTO: 3.2 K/UL

## 2019-02-03 PROCEDURE — 99285 EMERGENCY DEPT VISIT HI MDM: CPT

## 2019-02-03 PROCEDURE — 93005 ELECTROCARDIOGRAM TRACING: CPT

## 2019-02-03 PROCEDURE — 80197 ASSAY OF TACROLIMUS: CPT

## 2019-02-03 PROCEDURE — 81003 URINALYSIS AUTO W/O SCOPE: CPT

## 2019-02-03 PROCEDURE — 85025 COMPLETE CBC W/AUTO DIFF WBC: CPT

## 2019-02-03 PROCEDURE — 11000001 HC ACUTE MED/SURG PRIVATE ROOM

## 2019-02-03 PROCEDURE — 80053 COMPREHEN METABOLIC PANEL: CPT

## 2019-02-03 PROCEDURE — 84100 ASSAY OF PHOSPHORUS: CPT

## 2019-02-03 PROCEDURE — 36415 COLL VENOUS BLD VENIPUNCTURE: CPT

## 2019-02-03 PROCEDURE — 83735 ASSAY OF MAGNESIUM: CPT

## 2019-02-03 PROCEDURE — 83690 ASSAY OF LIPASE: CPT

## 2019-02-03 PROCEDURE — 25000003 PHARM REV CODE 250: Performed by: INTERNAL MEDICINE

## 2019-02-03 RX ORDER — ATORVASTATIN CALCIUM 40 MG/1
40 TABLET, FILM COATED ORAL DAILY
Status: DISCONTINUED | OUTPATIENT
Start: 2019-02-04 | End: 2019-02-06 | Stop reason: HOSPADM

## 2019-02-03 RX ORDER — LORAZEPAM 1 MG/1
1 TABLET ORAL 2 TIMES DAILY
Status: DISCONTINUED | OUTPATIENT
Start: 2019-02-03 | End: 2019-02-06 | Stop reason: HOSPADM

## 2019-02-03 RX ORDER — GLUCAGON 1 MG
1 KIT INJECTION
Status: DISCONTINUED | OUTPATIENT
Start: 2019-02-03 | End: 2019-02-06 | Stop reason: HOSPADM

## 2019-02-03 RX ORDER — INSULIN ASPART 100 [IU]/ML
0-5 INJECTION, SOLUTION INTRAVENOUS; SUBCUTANEOUS
Status: DISCONTINUED | OUTPATIENT
Start: 2019-02-03 | End: 2019-02-06 | Stop reason: HOSPADM

## 2019-02-03 RX ORDER — ONDANSETRON 2 MG/ML
4 INJECTION INTRAMUSCULAR; INTRAVENOUS EVERY 8 HOURS PRN
Status: DISCONTINUED | OUTPATIENT
Start: 2019-02-03 | End: 2019-02-06 | Stop reason: HOSPADM

## 2019-02-03 RX ORDER — IBUPROFEN 200 MG
16 TABLET ORAL
Status: DISCONTINUED | OUTPATIENT
Start: 2019-02-03 | End: 2019-02-06 | Stop reason: HOSPADM

## 2019-02-03 RX ORDER — TACROLIMUS 0.5 MG/1
0.5 CAPSULE ORAL 2 TIMES DAILY
Status: DISCONTINUED | OUTPATIENT
Start: 2019-02-03 | End: 2019-02-06 | Stop reason: HOSPADM

## 2019-02-03 RX ORDER — ASPIRIN 325 MG
325 TABLET ORAL ONCE
Status: COMPLETED | OUTPATIENT
Start: 2019-02-03 | End: 2019-02-03

## 2019-02-03 RX ORDER — CEFTRIAXONE 1 G/50ML
1 INJECTION, SOLUTION INTRAVENOUS
Status: DISCONTINUED | OUTPATIENT
Start: 2019-02-03 | End: 2019-02-04

## 2019-02-03 RX ORDER — SODIUM CHLORIDE 0.9 % (FLUSH) 0.9 %
5 SYRINGE (ML) INJECTION
Status: DISCONTINUED | OUTPATIENT
Start: 2019-02-03 | End: 2019-02-06 | Stop reason: HOSPADM

## 2019-02-03 RX ORDER — MYCOPHENOLATE MOFETIL 250 MG/1
500 CAPSULE ORAL 2 TIMES DAILY
Status: DISCONTINUED | OUTPATIENT
Start: 2019-02-03 | End: 2019-02-06 | Stop reason: HOSPADM

## 2019-02-03 RX ORDER — LANOLIN ALCOHOL/MO/W.PET/CERES
800 CREAM (GRAM) TOPICAL DAILY
Status: DISCONTINUED | OUTPATIENT
Start: 2019-02-04 | End: 2019-02-06 | Stop reason: HOSPADM

## 2019-02-03 RX ORDER — ACETAMINOPHEN 325 MG/1
650 TABLET ORAL EVERY 8 HOURS PRN
Status: DISCONTINUED | OUTPATIENT
Start: 2019-02-03 | End: 2019-02-06 | Stop reason: HOSPADM

## 2019-02-03 RX ORDER — OXYCODONE HYDROCHLORIDE 5 MG/1
5 TABLET ORAL EVERY 4 HOURS PRN
Status: DISCONTINUED | OUTPATIENT
Start: 2019-02-03 | End: 2019-02-06 | Stop reason: HOSPADM

## 2019-02-03 RX ORDER — IBUPROFEN 200 MG
24 TABLET ORAL
Status: DISCONTINUED | OUTPATIENT
Start: 2019-02-03 | End: 2019-02-06 | Stop reason: HOSPADM

## 2019-02-03 RX ORDER — FAMOTIDINE 20 MG/1
20 TABLET, FILM COATED ORAL 2 TIMES DAILY
Status: DISCONTINUED | OUTPATIENT
Start: 2019-02-03 | End: 2019-02-06 | Stop reason: HOSPADM

## 2019-02-03 RX ORDER — ERGOCALCIFEROL 1.25 MG/1
50000 CAPSULE ORAL
Status: DISCONTINUED | OUTPATIENT
Start: 2019-02-05 | End: 2019-02-06 | Stop reason: HOSPADM

## 2019-02-03 RX ORDER — ENOXAPARIN SODIUM 100 MG/ML
40 INJECTION SUBCUTANEOUS EVERY 24 HOURS
Status: DISCONTINUED | OUTPATIENT
Start: 2019-02-03 | End: 2019-02-06 | Stop reason: HOSPADM

## 2019-02-03 RX ORDER — ACETAMINOPHEN 325 MG/1
650 TABLET ORAL EVERY 4 HOURS PRN
Status: DISCONTINUED | OUTPATIENT
Start: 2019-02-03 | End: 2019-02-06 | Stop reason: HOSPADM

## 2019-02-03 RX ORDER — AMOXICILLIN 250 MG
1 CAPSULE ORAL 2 TIMES DAILY
Status: DISCONTINUED | OUTPATIENT
Start: 2019-02-03 | End: 2019-02-06 | Stop reason: HOSPADM

## 2019-02-03 RX ORDER — OXYCODONE HYDROCHLORIDE 5 MG/1
10 TABLET ORAL EVERY 6 HOURS PRN
Status: DISCONTINUED | OUTPATIENT
Start: 2019-02-03 | End: 2019-02-06 | Stop reason: HOSPADM

## 2019-02-03 RX ORDER — TACROLIMUS 1 MG/1
1 CAPSULE ORAL 2 TIMES DAILY
Status: DISCONTINUED | OUTPATIENT
Start: 2019-02-03 | End: 2019-02-06 | Stop reason: HOSPADM

## 2019-02-03 RX ADMIN — ASPIRIN 325 MG ORAL TABLET 325 MG: 325 PILL ORAL at 04:02

## 2019-02-03 NOTE — H&P
[facility] Medicine  History & Physical    Patient Name: Andra Newell  MRN: [id number]  Admission Date: 2/3/2019  Attending Physician: Jeronimo Angulo MD   Primary Care Provider: Gita Cohen MD         Patient information was obtained from patient and ER records.     Subjective:     Principal Problem:Disequilibrium    Chief Complaint:   Chief Complaint   Patient presents with    Dysuria     recent kiney transplant         HPI: 59 yo F pmhx living kidney transplant on 1/14/19 and DM who is being admitted to the hospital medicine service today out of concern for cerebellar stroke. Pt reports being in her NSOH until ~ 2300 last night when she woke up and felt especially dizzy while walking to the bathroom. She didn't think much of it at the time and was able to make it to/from the bathroom without incident. However, this AM on rising she reports her symptoms continued.  Given persistence pt was presented to the ED for evaluation. She does note similar c/o just after her KTx.    Pt denies syncopal episode. Denies shaking/tongue biting. Denies fever/chills. Denies CP. Denies new asymmetrical weakness. Denies HA or preceding aura-like symptoms.     Of note, pt has recently been treated with Dapsone for UTI beginning 1/28/2019. Yesterday she apparently was switched to Augmentin yesterday.        Past Medical History:   Diagnosis Date    Anemia     Anemia in chronic kidney disease, on chronic dialysis 7/12/2017    Arthritis     Asthma     allergic airway    Colon polyp     Depression     Diabetes mellitus     Diverticulosis     Eosinophilia     ESRD (end stage renal disease)     stage V, due for living donor 9/2018    ESRD on dialysis     GERD (gastroesophageal reflux disease)     Gout     Gout     Hyperlipidemia     Hypertension     Low back pain     MRSA carrier     Obesity     Renal disorder     Rotator cuff syndrome--left     Thyroid disease     Ulnar  neuropathy of right upper extremity     Uncontrolled type 2 diabetes mellitus with hyperglycemia        Past Surgical History:   Procedure Laterality Date    ACHILLES TENDON SURGERY Left May 2015    ARTHROSCOPY, HIP Left 10/3/2013    [facility] at [facility] OR    ARTHROSCOPY-HIP WITH TROCHANTERIC BURSECTOMY Left 10/3/2013    [facility]    ARTHROSCOPY-SHOULDER Left 11/24/2015    [facility]    ARTHROSCOPY-SHOULDER WITH SUBACROMIAL DECOMPRESSION Left 7/12/2016    [facility] at [facility]    BACK SURGERY      CHOLECYSTECTOMY      COLONOSCOPY N/A 9/6/2017    [facility]    DEBRIDEMENT-SHOULDER-ARTHROSCOPIC Left 7/12/2016    [facility] OR    DEBRIDEMENT-SHOULDER-ARTHROSCOPIC; LABRAL Left 11/24/2015    [facility] OR    DIALYSIS FISTULA CREATION  12/2017    FEMOROPLASTY, ARTHROSCOPIC Left 10/3/2013    [facility]    HEMORRHOID SURGERY      INJECTION-AMNIOX Left 7/12/2016    [facility]    JOINT REPLACEMENT      left    KNEE SURGERY      LYSIS-ADHESION Left 11/24/2015    [facility]    REPAIR ROTATOR CUFF ARTHROSCOPIC Left 7/12/2016    [facility]    REPAIR-TENDON-BICEP Left 11/24/2015    [facility]    SHOULDER ARTHROSCOPY      SHOULDER SURGERY      TRANSPLANT, KIDNEY N/A 1/14/2019    Performed by [facility] at [facility] OR [address] [place] [address]    UPPER GASTROINTESTINAL ENDOSCOPY  ~2013    Dr. Moralez       Review of patient's allergies indicates:   Allergen Reactions    Torsemide Diarrhea     Diarrhea stopped once discontinued med    Codeine Itching     Can take oxycodone and hydrocodone with Benadryl       No current facility-administered medications on file prior to  "encounter.      Current Outpatient Medications on File Prior to Encounter   Medication Sig    atorvastatin (LIPITOR) 40 MG tablet TAKE ONE TABLET BY MOUTH ONCE DAILY    BD ULTRA-FINE MINI PEN NEEDLE 31 gauge x 3/16" Ndle USE ONE NEEDLE ONCE DAILY    cefpodoxime (VANTIN) 200 MG tablet Take 1 tablet (200 mg total) by mouth every 12 (twelve) hours. for 5 days    dapsone 100 MG Tab Take 1 tablet (100 mg total) by mouth once daily.    ergocalciferol (ERGOCALCIFEROL) 50,000 unit Cap Take 1 capsule (50,000 Units total) by mouth every 7 days. Take on Tues    famotidine (PEPCID) 20 MG tablet Take 1 tablet (20 mg total) by mouth 2 (two) times daily.    insulin (BASAGLAR KWIKPEN U-100 INSULIN) glargine 100 units/mL (3mL) SubQ pen Inject 16 Units into the skin every evening. (Patient taking differently: Inject 20 Units into the skin every evening. )    INV acyclovir/placebo capsule Take 1 capsule (400 mg total) by mouth every 12 (twelve) hours.  For investigational use only. Protocol Mk 8228-002    INV MK-8228/placebo 480 mg oral tablet Take 1 tablet (480 mg total) by mouth once daily. FOR INVESTIGATIONAL USE ONLY. Patient Study# [id number]. Protocol: MK 8228-002    INV valganciclovir/placebo tablet Take 2 tablets (900 mg total) by mouth once daily.  FOR INVESTIGATIONAL USE ONLY. Protocol: MK-8228-002    levothyroxine (SYNTHROID) 75 MCG tablet TAKE ONE TABLET BY MOUTH ONCE DAILY    loratadine (CLARITIN) 10 mg tablet TAKE ONE TABLET BY MOUTH ONCE DAILY    LORazepam (ATIVAN) 2 MG Tab Take 1 tablet (2 mg total) by mouth 2 (two) times daily. (Patient taking differently: Take 1 mg by mouth 2 (two) times daily. )    magnesium oxide (MAG-OX) 400 mg (241.3 mg magnesium) tablet Take 2 tablets (800 mg total) by mouth once daily.    multivitamin (THERAGRAN) tablet Take 1 tablet by mouth once daily.    mycophenolate (CELLCEPT) 250 mg Cap Take 2 capsules (500 mg total) by mouth 2 (two) times daily.    oxyCODONE (ROXICODONE) " 10 mg Tab immediate release tablet Take 0.5-1 tablets (5-10 mg total) by mouth every 4 (four) hours as needed.    SITagliptin (JANUVIA) 100 MG Tab Take 1 tablet (100 mg total) by mouth once daily.    tacrolimus (PROGRAF) 0.5 MG Cap Take 3 capsules (1.5 mg total) by mouth every morning AND 3 capsules (1.5 mg total) every evening. Z94.0 kidney transplant.    VIIBRYD 40 mg Tab tablet TAKE ONE TABLET BY MOUTH ONCE DAILY     Family History     Problem Relation (Age of Onset)    Alzheimer's disease Mother    COPD Maternal Aunt    Diabetes Mother, Sister, Maternal Grandmother, Maternal Aunt    Heart disease Father, Brother, Maternal Aunt    Hyperlipidemia Mother    Hypertension Maternal Grandmother, Maternal Aunt    Lupus Sister    No Known Problems Son, Paternal Grandmother, Paternal Grandfather, Maternal Uncle, Paternal Aunt, Paternal Uncle    Ovarian cancer Sister    Stroke Father    Thyroid disease Mother        Tobacco Use    Smoking status: Never Smoker    Smokeless tobacco: Never Used   Substance and Sexual Activity    Alcohol use: No    Drug use: No    Sexual activity: No     Review of Systems   Constitutional: Negative for chills and fever.   HENT: Negative for postnasal drip, sore throat and voice change.    Respiratory: Negative for cough, chest tightness and shortness of breath.    Cardiovascular: Negative for chest pain.   Gastrointestinal: Negative for abdominal distention, abdominal pain and nausea.   Genitourinary: Negative for dysuria and hematuria.   Skin: Negative for pallor and rash.   Neurological: Positive for dizziness and light-headedness. Negative for weakness, numbness and headaches.   Psychiatric/Behavioral: Negative for agitation, behavioral problems and confusion.     Objective:     Vital Signs (Most Recent):  Temp: 98.2 °F (36.8 °C) (02/03/19 1057)  Pulse: 86 (02/03/19 1317)  Resp: 18 (02/03/19 1057)  BP: (!) 121/57 (02/03/19 1317)  SpO2: (!) 92 % (02/03/19 1317) Vital Signs (24h  Range):  Temp:  [98.2 °F (36.8 °C)] 98.2 °F (36.8 °C)  Pulse:  [] 86  Resp:  [18] 18  SpO2:  [92 %-95 %] 92 %  BP: (121-133)/(57-63) 121/57     Weight: 99.3 kg (219 lb)  Body mass index is 34.3 kg/m².    Physical Exam   Constitutional: She is oriented to person, place, and time. She appears well-developed and well-nourished.   HENT:   Head: Normocephalic and atraumatic.   Eyes: EOM are normal. Pupils are equal, round, and reactive to light.   Neck: Normal range of motion. Neck supple.   Cardiovascular: Normal rate, regular rhythm and normal heart sounds.   Pulmonary/Chest: Effort normal and breath sounds normal. No respiratory distress.   Abdominal: Soft. Bowel sounds are normal. She exhibits no distension. There is no tenderness.   Musculoskeletal: Normal range of motion. She exhibits no edema.   Neurological: She is alert and oriented to person, place, and time. No cranial nerve deficit or sensory deficit. She displays no seizure activity. Gait abnormal. Coordination normal.   4/5  b/l  4/5 BLE  + Romberg   Skin: Skin is warm and dry.   Surgical incision intact         CRANIAL NERVES     CN III, IV, VI   Pupils are equal, round, and reactive to light.  Extraocular motions are normal.        Significant Labs:   A1C:   Recent Labs   Lab 09/10/18  1115 01/09/19  0943   HGBA1C 7.8* 6.6*     CBC:   Recent Labs   Lab 02/03/19  1114   WBC 3.20*   HGB 8.9*   HCT 27.2*        CMP:   Recent Labs   Lab 02/03/19  1114      K 4.9      CO2 25   *   BUN 19   CREATININE 1.2   CALCIUM 9.4   PROT 7.0   ALBUMIN 3.6   BILITOT 0.8   ALKPHOS 224*   AST 19   ALT 31   ANIONGAP 10   EGFRNONAA 49*     Cardiac Markers: No results for input(s): CKMB, MYOGLOBIN, BNP, TROPISTAT in the last 48 hours.  Coagulation: No results for input(s): PT, INR, APTT in the last 48 hours.  Lipid Panel: No results for input(s): CHOL, HDL, LDLCALC, TRIG, CHOLHDL in the last 48 hours.  TSH:   Recent Labs   Lab 09/10/18  1115    TSH 0.504     Urine Studies:   Recent Labs   Lab 02/03/19  1110   COLORU Yellow   APPEARANCEUA Clear   PHUR 6.0   SPECGRAV <=1.005*   PROTEINUA Negative   GLUCUA Negative   KETONESU Negative   BILIRUBINUA Negative   OCCULTUA Negative   NITRITE Negative   UROBILINOGEN Negative   LEUKOCYTESUR Negative       Significant Imaging: CT: I have reviewed all pertinent results/findings within the past 24 hours and my personal findings are:  -ve bleed    Assessment/Plan:     * Disequilibrium    Etiology uncertain, ? 2/2 cerebellar CVA v medication SE  Will check MRI  Check AZALEA and carotid doppler  Consult neurology  ASA      Status post living-donor kidney transplantation    S/p KTx 1/2019  Tx coordinator alerted by ED: no indication to transfer to [facility]  Continue immunosuppression        BMI 36.0-36.9,adult           HLD (hyperlipidemia)    Chronic issue. Continue home meds       Anemia in chronic kidney disease, on chronic dialysis           Hypothyroidism    Chronic issues. Continue home meds     Depression    Chronic issue. Continue home medications       Uncontrolled type 2 diabetes mellitus with stage 5 chronic kidney disease not on chronic dialysis, with long-term current use of insulin    Continue basal insulin  SSI with accuchecks ACHS  ADA diet         VTE Risk Mitigation (From admission, onward)    None             Frank Ricardo MD  Department of Hospital Medicine   [facility]

## 2019-02-03 NOTE — ASSESSMENT & PLAN NOTE
Etiology uncertain, ? 2/2 cerebellar CVA v medication SE  Will check MRI  Check AZALEA and carotid doppler  Consult neurology  ASA

## 2019-02-03 NOTE — HPI
59 yo F pmhx living kidney transplant on 1/14/19 and DM who is being admitted to the hospital medicine service today out of concern for cerebellar stroke. Pt reports being in her NSOH until ~ 2300 last night when she woke up and felt especially dizzy while walking to the bathroom. She didn't think much of it at the time and was able to make it to/from the bathroom without incident. However, this AM on rising she reports her symptoms continued.  Given persistence pt was presented to the ED for evaluation. She does note similar c/o just after her KTx.    Pt denies syncopal episode. Denies shaking/tongue biting. Denies fever/chills. Denies CP. Denies new asymmetrical weakness. Denies HA or preceding aura-like symptoms.     Of note, pt has recently been treated with Dapsone for UTI beginning 1/28/2019. Yesterday she apparently was switched to Augmentin yesterday.

## 2019-02-03 NOTE — ASSESSMENT & PLAN NOTE
S/p KTx 1/2019  Tx coordinator alerted by ED: no indication to transfer to [facility] [place]  Continue immunosuppression

## 2019-02-03 NOTE — TELEPHONE ENCOUNTER
"Kidney txp 01/14/19  Has a UTI, was on dapsone 100 mg, last evening a nurse called and changed to amoxo-k-clav 875/175 mg.  This am her urine is very orange, and she is feeling "off balance." when she tries to walk, she's having to hold on to things.      Reason for Disposition   Patient sounds very sick or weak to the triager    Answer Assessment - Initial Assessment Questions  1. COLOR of URINE: "Describe the color of the urine."  (e.g., tea-colored, pink, red, blood clots, bloody)      Very orange  2. ONSET: "When did the bleeding start?"       This am  3. EPISODES: "How many times has there been blood in the urine?" or "How many times today?"      na  4. PAIN with URINATION: "Is there any pain with passing your urine?" If so, ask: "How bad is the pain?"  (Scale 1-10; or mild, moderate, severe)     - MILD - complains slightly about urination hurting     - MODERATE - interferes with normal activities       - SEVERE - excruciating, unwilling or unable to urinate because of the pain       na  5. FEVER: "Do you have a fever?" If so, ask: "What is your temperature, how was it measured, and when did it start?"      no  6. ASSOCIATED SYMPTOMS: "Are you passing urine more frequently than usual?"      Has not been having hematuria  7. OTHER SYMPTOMS: "Do you have any other symptoms?" (e.g., back/flank pain, abdominal pain, vomiting)      "off balance"  Having to hold onto things when she ambulates  8. PREGNANCY: "Is there any chance you are pregnant?" "When was your last menstrual period?"      na    Protocols used: ST URINE - BLOOD IN-A-      "

## 2019-02-03 NOTE — SUBJECTIVE & OBJECTIVE
Past Medical History:   Diagnosis Date    Anemia     Anemia in chronic kidney disease, on chronic dialysis 7/12/2017    Arthritis     Asthma     allergic airway    Colon polyp     Depression     Diabetes mellitus     Diverticulosis     Eosinophilia     ESRD (end stage renal disease)     stage V, due for living donor 9/2018    ESRD on dialysis     GERD (gastroesophageal reflux disease)     Gout     Gout     Hyperlipidemia     Hypertension     Low back pain     MRSA carrier     Obesity     Renal disorder     Rotator cuff syndrome--left     Thyroid disease     Ulnar neuropathy of right upper extremity     Uncontrolled type 2 diabetes mellitus with hyperglycemia        Past Surgical History:   Procedure Laterality Date    ACHILLES TENDON SURGERY Left May 2015    ARTHROSCOPY, HIP Left 10/3/2013    [facility]    ARTHROSCOPY-HIP WITH TROCHANTERIC BURSECTOMY Left 10/3/2013    [facility] at [facility] OR    ARTHROSCOPY-SHOULDER Left 11/24/2015    [facility]    ARTHROSCOPY-SHOULDER WITH SUBACROMIAL DECOMPRESSION Left 7/12/2016    [facility] at [facility]    BACK SURGERY      CHOLECYSTECTOMY      COLONOSCOPY N/A 9/6/2017    [facility] END[facility]    DEBRIDEMENT-SHOULDER-ARTHROSCOPIC Left 7/12/2016    [facility] at [facility] OR    DEBRIDEMENT-SHOULDER-ARTHROSCOPIC; LABRAL Left 11/24/2015    [facility] at [facility] OR    DIALYSIS FISTULA CREATION  12/2017    FEMOROPLASTY, ARTHROSCOPIC Left 10/3/2013    [facility] at [facility] OR    HEMORRHOID SURGERY      INJECTION-AMNIOX Left 7/12/2016    [facility] at [facility]    JOINT REPLACEMENT      left    KNEE SURGERY      LYSIS-ADHESION Left 11/24/2015    [facility] at [facility] OR    REPAIR ROTATOR CUFF ARTHROSCOPIC Left 7/12/2016    Performed  "[facility]    REPAIR-TENDON-BICEP Left 11/24/2015    [facility] at [facility] OR    SHOULDER ARTHROSCOPY      SHOULDER SURGERY      TRANSPLANT, KIDNEY N/A 1/14/2019    Performed by [facility] at [facility] OR [address] [place] [address]    UPPER GASTROINTESTINAL ENDOSCOPY  ~2013     [facility]       Review of patient's allergies indicates:   Allergen Reactions    Torsemide Diarrhea     Diarrhea stopped once discontinued med    Codeine Itching     Can take oxycodone and hydrocodone with Benadryl       No current facility-administered medications on file prior to encounter.      Current Outpatient Medications on File Prior to Encounter   Medication Sig    atorvastatin (LIPITOR) 40 MG tablet TAKE ONE TABLET BY MOUTH ONCE DAILY    BD ULTRA-FINE MINI PEN NEEDLE 31 gauge x 3/16" Ndle USE ONE NEEDLE ONCE DAILY    cefpodoxime (VANTIN) 200 MG tablet Take 1 tablet (200 mg total) by mouth every 12 (twelve) hours. for 5 days    dapsone 100 MG Tab Take 1 tablet (100 mg total) by mouth once daily.    ergocalciferol (ERGOCALCIFEROL) 50,000 unit Cap Take 1 capsule (50,000 Units total) by mouth every 7 days. Take on Tues    famotidine (PEPCID) 20 MG tablet Take 1 tablet (20 mg total) by mouth 2 (two) times daily.    insulin (BASAGLAR KWIKPEN U-100 INSULIN) glargine 100 units/mL (3mL) SubQ pen Inject 16 Units into the skin every evening. (Patient taking differently: Inject 20 Units into the skin every evening. )    INV acyclovir/placebo capsule Take 1 capsule (400 mg total) by mouth every 12 (twelve) hours.  For investigational use only. Protocol Mk 8228-002    INV MK-8228/placebo 480 mg oral tablet Take 1 tablet (480 mg total) by mouth once daily. FOR INVESTIGATIONAL USE ONLY. Patient Study# [id number]. Protocol: MK 8228-002    INV valganciclovir/placebo tablet Take 2 tablets (900 mg total) by mouth once daily.  FOR INVESTIGATIONAL USE ONLY. Protocol: MK-8228-002    levothyroxine (SYNTHROID) 75 MCG " tablet TAKE ONE TABLET BY MOUTH ONCE DAILY    loratadine (CLARITIN) 10 mg tablet TAKE ONE TABLET BY MOUTH ONCE DAILY    LORazepam (ATIVAN) 2 MG Tab Take 1 tablet (2 mg total) by mouth 2 (two) times daily. (Patient taking differently: Take 1 mg by mouth 2 (two) times daily. )    magnesium oxide (MAG-OX) 400 mg (241.3 mg magnesium) tablet Take 2 tablets (800 mg total) by mouth once daily.    multivitamin (THERAGRAN) tablet Take 1 tablet by mouth once daily.    mycophenolate (CELLCEPT) 250 mg Cap Take 2 capsules (500 mg total) by mouth 2 (two) times daily.    oxyCODONE (ROXICODONE) 10 mg Tab immediate release tablet Take 0.5-1 tablets (5-10 mg total) by mouth every 4 (four) hours as needed.    SITagliptin (JANUVIA) 100 MG Tab Take 1 tablet (100 mg total) by mouth once daily.    tacrolimus (PROGRAF) 0.5 MG Cap Take 3 capsules (1.5 mg total) by mouth every morning AND 3 capsules (1.5 mg total) every evening. Z94.0 kidney transplant.    VIIBRYD 40 mg Tab tablet TAKE ONE TABLET BY MOUTH ONCE DAILY     Family History     Problem Relation (Age of Onset)    Alzheimer's disease Mother    COPD Maternal Aunt    Diabetes Mother, Sister, Maternal Grandmother, Maternal Aunt    Heart disease Father, Brother, Maternal Aunt    Hyperlipidemia Mother    Hypertension Maternal Grandmother, Maternal Aunt    Lupus Sister    No Known Problems Son, Paternal Grandmother, Paternal Grandfather, Maternal Uncle, Paternal Aunt, Paternal Uncle    Ovarian cancer Sister    Stroke Father    Thyroid disease Mother        Tobacco Use    Smoking status: Never Smoker    Smokeless tobacco: Never Used   Substance and Sexual Activity    Alcohol use: No    Drug use: No    Sexual activity: No     Review of Systems   Constitutional: Negative for chills and fever.   HENT: Negative for postnasal drip, sore throat and voice change.    Respiratory: Negative for cough, chest tightness and shortness of breath.    Cardiovascular: Negative for chest pain.    Gastrointestinal: Negative for abdominal distention, abdominal pain and nausea.   Genitourinary: Negative for dysuria and hematuria.   Skin: Negative for pallor and rash.   Neurological: Positive for dizziness and light-headedness. Negative for weakness, numbness and headaches.   Psychiatric/Behavioral: Negative for agitation, behavioral problems and confusion.     Objective:     Vital Signs (Most Recent):  Temp: 98.2 °F (36.8 °C) (02/03/19 1057)  Pulse: 86 (02/03/19 1317)  Resp: 18 (02/03/19 1057)  BP: (!) 121/57 (02/03/19 1317)  SpO2: (!) 92 % (02/03/19 1317) Vital Signs (24h Range):  Temp:  [98.2 °F (36.8 °C)] 98.2 °F (36.8 °C)  Pulse:  [] 86  Resp:  [18] 18  SpO2:  [92 %-95 %] 92 %  BP: (121-133)/(57-63) 121/57     Weight: 99.3 kg (219 lb)  Body mass index is 34.3 kg/m².    Physical Exam   Constitutional: She is oriented to person, place, and time. She appears well-developed and well-nourished.   HENT:   Head: Normocephalic and atraumatic.   Eyes: EOM are normal. Pupils are equal, round, and reactive to light.   Neck: Normal range of motion. Neck supple.   Cardiovascular: Normal rate, regular rhythm and normal heart sounds.   Pulmonary/Chest: Effort normal and breath sounds normal. No respiratory distress.   Abdominal: Soft. Bowel sounds are normal. She exhibits no distension. There is no tenderness.   Musculoskeletal: Normal range of motion. She exhibits no edema.   Neurological: She is alert and oriented to person, place, and time. No cranial nerve deficit or sensory deficit. She displays no seizure activity. Gait abnormal. Coordination normal.   4/5  b/l  4/5 BLE  + Romberg   Skin: Skin is warm and dry.   Surgical incision intact         CRANIAL NERVES     CN III, IV, VI   Pupils are equal, round, and reactive to light.  Extraocular motions are normal.        Significant Labs:   A1C:   Recent Labs   Lab 09/10/18  1115 01/09/19  0943   HGBA1C 7.8* 6.6*     CBC:   Recent Labs   Lab 02/03/19  1118    WBC 3.20*   HGB 8.9*   HCT 27.2*        CMP:   Recent Labs   Lab 02/03/19  1114      K 4.9      CO2 25   *   BUN 19   CREATININE 1.2   CALCIUM 9.4   PROT 7.0   ALBUMIN 3.6   BILITOT 0.8   ALKPHOS 224*   AST 19   ALT 31   ANIONGAP 10   EGFRNONAA 49*     Cardiac Markers: No results for input(s): CKMB, MYOGLOBIN, BNP, TROPISTAT in the last 48 hours.  Coagulation: No results for input(s): PT, INR, APTT in the last 48 hours.  Lipid Panel: No results for input(s): CHOL, HDL, LDLCALC, TRIG, CHOLHDL in the last 48 hours.  TSH:   Recent Labs   Lab 09/10/18  1115   TSH 0.504     Urine Studies:   Recent Labs   Lab 02/03/19  1110   COLORU Yellow   APPEARANCEUA Clear   PHUR 6.0   SPECGRAV <=1.005*   PROTEINUA Negative   GLUCUA Negative   KETONESU Negative   BILIRUBINUA Negative   OCCULTUA Negative   NITRITE Negative   UROBILINOGEN Negative   LEUKOCYTESUR Negative       Significant Imaging: CT: I have reviewed all pertinent results/findings within the past 24 hours and my personal findings are:  -ve bleed

## 2019-02-03 NOTE — ED PROVIDER NOTES
"Encounter Date: 2/3/2019    SCRIBE #1 NOTE: I, Noemí Frias, am scribing for, and in the presence of,  Dr. Angulo. I have scribed the entire note.       History     Chief Complaint   Patient presents with    Dysuria     recent kiney transplant      58 year-old female presents to the ED for evaluation for dysuria with dark appearing urine that started yesterday. Patient started taking Dapsone on 1/28 to treat UTI. She was compliant with Dapsone until her prescription was changed to Augmentin, which she started yesterday prior to onset of urinary symptoms. Patient reports her urine is darker and yellow-brown in color. Additionally, patient reports feeling unusually dizzy with difficulty ambulating. At baseline, patient is ambulatory without use of assistive devices. Patient woke this morning at 2400 to use the restroom, when symptoms began. She states feeling "off-balance" with gait disturbance, causing her to feel unsteady on her right side. Patient denies any preceding aura or room spinning sensation. No syncopal episodes or falls reported. Patient reports that dizziness has been persistent since onset, without any change. Patient denies fever, chills, abdominal pain, nausea, vomiting, or any other concerning symptoms. Per medical record, patient underwent left kidney transplant on 1/14/2019 from live donor. Patient states that she was asymptomatic prior to onset. No post-operative complications, nausea/vomiting, or increased pain. Caregiver endorses compliance with all prescriptions, accurate measurements of I/O's, and regular monitors incision site for any signs of infection. Patient notes some recent elevation of CBG (230 last night), despite well-controlled diet at home.         The history is provided by the patient.     Review of patient's allergies indicates:   Allergen Reactions    Torsemide Diarrhea     Diarrhea stopped once discontinued med    Codeine Itching     Can take oxycodone and hydrocodone with " Amaurijarred     Past Medical History:   Diagnosis Date    Anemia     Anemia in chronic kidney disease, on chronic dialysis 7/12/2017    Arthritis     Asthma     allergic airway    Colon polyp     Depression     Diabetes mellitus     Diverticulosis     Eosinophilia     ESRD (end stage renal disease)     stage V, due for living donor 9/2018    ESRD on dialysis     GERD (gastroesophageal reflux disease)     Gout     Gout     Hyperlipidemia     Hypertension     Low back pain     MRSA carrier     Obesity     Renal disorder     Rotator cuff syndrome--left     Thyroid disease     Ulnar neuropathy of right upper extremity     Uncontrolled type 2 diabetes mellitus with hyperglycemia      Past Surgical History:   Procedure Laterality Date    ACHILLES TENDON SURGERY Left May 2015    ARTHROSCOPY, HIP Left 10/3/2013    [facility]    ARTHROSCOPY-HIP WITH TROCHANTERIC BURSECTOMY Left 10/3/2013    [facility]    ARTHROSCOPY-SHOULDER Left 11/24/2015    [facility]    ARTHROSCOPY-SHOULDER WITH SUBACROMIAL DECOMPRESSION Left 7/12/2016    [facility]    BACK SURGERY      CHOLECYSTECTOMY      COLONOSCOPY N/A 9/6/2017    [facility]    DEBRIDEMENT-SHOULDER-ARTHROSCOPIC Left 7/12/2016    [facility]    DEBRIDEMENT-SHOULDER-ARTHROSCOPIC; LABRAL Left 11/24/2015    [facility]    DIALYSIS FISTULA CREATION  12/2017    FEMOROPLASTY, ARTHROSCOPIC Left 10/3/2013    [facility]    HEMORRHOID SURGERY      INJECTION-AMNIOX Left 7/12/2016    [facility]    JOINT REPLACEMENT      left    KNEE SURGERY      LYSIS-ADHESION Left 11/24/2015    [facility]    REPAIR ROTATOR CUFF ARTHROSCOPIC Left 7/12/2016     [facility] at [facility] OR    REPAIR-TENDON-BICEP Left 11/24/2015    [facility] at [facility]    SHOULDER ARTHROSCOPY      SHOULDER SURGERY      TRANSPLANT, KIDNEY N/A 1/14/2019    [facility]    UPPER GASTROINTESTINAL ENDOSCOPY  ~2013     [facility]     Family History   Problem Relation Age of Onset    Diabetes Mother     Alzheimer's disease Mother     Thyroid disease Mother     Hyperlipidemia Mother     Heart disease Father     Stroke Father     Diabetes Sister     Lupus Sister     Ovarian cancer Sister     Heart disease Brother     No Known Problems Son     Diabetes Maternal Grandmother     Hypertension Maternal Grandmother     No Known Problems Paternal Grandmother     No Known Problems Paternal Grandfather     COPD Maternal Aunt     Diabetes Maternal Aunt     Heart disease Maternal Aunt     Hypertension Maternal Aunt     No Known Problems Maternal Uncle     No Known Problems Paternal Aunt     No Known Problems Paternal Uncle     Colon cancer Neg Hx     Colon polyps Neg Hx     Crohn's disease Neg Hx     Ulcerative colitis Neg Hx     Celiac disease Neg Hx      Social History     Tobacco Use    Smoking status: Never Smoker    Smokeless tobacco: Never Used   Substance Use Topics    Alcohol use: No    Drug use: No     Review of Systems   Constitutional: Negative for chills and fever.   HENT: Negative for congestion, rhinorrhea and sore throat.    Eyes: Negative for redness and visual disturbance.   Respiratory: Negative for cough, shortness of breath and wheezing.    Cardiovascular: Negative for chest pain and palpitations.   Gastrointestinal: Negative for abdominal pain, diarrhea, nausea and vomiting.   Genitourinary: Positive for dysuria. Negative for hematuria.   Musculoskeletal: Positive for gait problem. Negative for back pain, myalgias and neck pain.   Skin: Negative for rash.   Neurological: Positive  for dizziness. Negative for weakness and light-headedness.   Psychiatric/Behavioral: Negative for confusion.       Physical Exam     Initial Vitals [02/03/19 1057]   BP Pulse Resp Temp SpO2   133/63 100 18 98.2 °F (36.8 °C) 95 %      MAP       --         Physical Exam    Constitutional: She appears well-developed and well-nourished.  Non-toxic appearance. No distress.   HENT:   Head: Normocephalic and atraumatic.   Eyes: EOM are normal. Pupils are equal, round, and reactive to light.   Neck: Normal range of motion. Neck supple. No neck rigidity. No JVD present.   Cardiovascular: Normal rate, regular rhythm, normal heart sounds and intact distal pulses. Exam reveals no gallop and no friction rub.    No murmur heard.  Pulmonary/Chest: Breath sounds normal. She has no wheezes. She has no rhonchi. She has no rales.   Abdominal: Soft. Bowel sounds are normal. She exhibits no distension. There is no tenderness. There is no rigidity, no rebound and no guarding.   Scattered bruising over lower abdominal wall  Marginally erythemenous surrounding incision site  No fluctuance or sign of dehiscence   Scattered bruisng over lower abdominal wall   Musculoskeletal: Normal range of motion.   Neurological: She is alert and oriented to person, place, and time. She has normal strength and normal reflexes. No cranial nerve deficit or sensory deficit. She exhibits normal muscle tone. Coordination normal. GCS score is 15. GCS eye subscore is 4. GCS verbal subscore is 5. GCS motor subscore is 6.   Finger to nose grossly intact  Negative dysdiadochokinesia, able to perform supination/pronation of hands   Negative Romberg  Gait is guarded and slow   Unable to perform heel to toe    DTR  1+ patellar  2+ brachioradialis    Skin: Skin is warm and dry.   Psychiatric: She has a normal mood and affect. Her speech is normal and behavior is normal. She is not actively hallucinating.         ED Course   Procedures  Labs Reviewed   CBC W/ AUTO  DIFFERENTIAL - Abnormal; Notable for the following components:       Result Value    WBC 3.20 (*)     RBC 3.00 (*)     Hemoglobin 8.9 (*)     Hematocrit 27.2 (*)     MPV 7.8 (*)     Lymph # 0.1 (*)     Gran% 82.4 (*)     Lymph% 4.3 (*)     All other components within normal limits   COMPREHENSIVE METABOLIC PANEL - Abnormal; Notable for the following components:    Glucose 244 (*)     Alkaline Phosphatase 224 (*)     eGFR if  57 (*)     eGFR if non  49 (*)     All other components within normal limits   LIPASE - Abnormal; Notable for the following components:    Lipase 106 (*)     All other components within normal limits   URINALYSIS, REFLEX TO URINE CULTURE - Abnormal; Notable for the following components:    Specific Gravity, UA <=1.005 (*)     All other components within normal limits    Narrative:     Preferred Collection Type->Urine, Clean Catch   MAGNESIUM   PHOSPHORUS   TACROLIMUS LEVEL     EKG Readings: (Independently Interpreted)   NSR. Rate 81. Left axis deviation. Normal ST segments and T waves.        X-Rays:   Independently Interpreted Readings:   Other Readings:  Reviewed by myself, read by radiology.      Imaging Results          CT Head Without Contrast (Final result)  Result time 02/03/19 12:59:44    Final result by Bogdan Manjarrez Jr., MD (02/03/19 12:59:44)                 Impression:      Negative head CT.      Electronically signed by: Bogdan Manjarrez MD  Date:    02/03/2019  Time:    12:59             Narrative:    EXAMINATION:  CT HEAD WITHOUT CONTRAST    CLINICAL HISTORY:  disequilibrium;    TECHNIQUE:  Low dose axial images were obtained through the head.  Coronal and sagittal reformations were also performed. Contrast was not administered.    COMPARISON:  None    FINDINGS:  No cranial fracture is identified.  Scalp edema or hematoma is not noted.  The internal auditory canals are sharp and symmetric.    The basal cisterns are clear and symmetric.  There  is no mass effect or midline shift.  The ventricles are of normal size and symmetric.  The gray-white matter differentiation is normal.  Within the brain parenchyma no hyper or hypodense lesions consistent with tumor, edema, CVA or hemorrhage is seen.  No intracranial hemorrhage or hematoma is noted.                               Medical Decision Making:   Initial Assessment:   58-year-old woman status post living donor renal transplant on January 14, 2019 presents emergency department for evaluation of disequilibrium that is been constant since last night while getting up to use the restroom and noticing orange colored urine this morning.  Recently being treated for UTI initially with dapsone and now with Augmentin.  No other symptoms.  Neurological exam reveals some guarded gait with ataxia on heel to toe.  Negative Romberg.  No flank or abdominal tenderness on exam, wound appears to be healing well. Urinalysis without further evidence of infection.  No great electrolyte abnormalities or significantly decreased renal function.   Clinical Tests:   Lab Tests: Ordered and Reviewed  Radiological Study: Ordered and Reviewed  ED Management:  Differentials include:  Dizziness due to Cerebellar CVA versus medication adverse affect     Discussed with transfer surgery at [facility], who saw no need for emergent intervention or definate need for transfer. Will reschedule stent removal.     Discussed with Hospitalist Medicine, who agree with plan to admit the patient for neuro checks and MRI.                       Clinical Impression:     1. Disequilibrium    2. CVA (cerebral vascular accident)    3. CVA (cerebral vascular accident)          Scribe Attestation  I, Kev Marc, personally performed the services described in this documentation. All medical record entries made by the scribe were at my direction and in my presence.  I have reviewed the chart and agree that the record reflects my personal performance and  is accurate and complete. Jeronimo Angulo MD.  5:30 PM 02/03/2019       DISCLAIMER: This note was prepared with [employer] Direct voice recognition transcription software. Garbled syntax, mangled pronouns, and other bizarre constructions may be attributed to that software system.                      Jeronimo Angulo MD  02/03/19 1737

## 2019-02-03 NOTE — UM SECONDARY REVIEW
Physician Advisor External    Level of Care Issue    Approved Inpatient for admit 2/3/19 per ehr md reviewer, dr duckworth.

## 2019-02-04 PROBLEM — R55 NEAR SYNCOPE: Status: ACTIVE | Noted: 2019-02-04

## 2019-02-04 LAB
ALBUMIN SERPL BCP-MCNC: 3.4 G/DL
ALP SERPL-CCNC: 202 U/L
ALT SERPL W/O P-5'-P-CCNC: 30 U/L
ANION GAP SERPL CALC-SCNC: 9 MMOL/L
AST SERPL-CCNC: 19 U/L
BILIRUB SERPL-MCNC: 0.5 MG/DL
BUN SERPL-MCNC: 19 MG/DL
CALCIUM SERPL-MCNC: 9.1 MG/DL
CHLORIDE SERPL-SCNC: 106 MMOL/L
CO2 SERPL-SCNC: 23 MMOL/L
CREAT SERPL-MCNC: 1.2 MG/DL
EST. GFR  (AFRICAN AMERICAN): 57 ML/MIN/1.73 M^2
EST. GFR  (NON AFRICAN AMERICAN): 49 ML/MIN/1.73 M^2
ESTIMATED AVG GLUCOSE: 140 MG/DL
GLUCOSE SERPL-MCNC: 190 MG/DL
HBA1C MFR BLD HPLC: 6.5 %
POCT GLUCOSE: 176 MG/DL (ref 70–110)
POCT GLUCOSE: 190 MG/DL (ref 70–110)
POCT GLUCOSE: 197 MG/DL (ref 70–110)
POTASSIUM SERPL-SCNC: 4.3 MMOL/L
PROT SERPL-MCNC: 6.4 G/DL
SODIUM SERPL-SCNC: 138 MMOL/L
TACROLIMUS BLD-MCNC: 10.8 NG/ML
TACROLIMUS BLD-MCNC: 13.3 NG/ML

## 2019-02-04 PROCEDURE — 83036 HEMOGLOBIN GLYCOSYLATED A1C: CPT

## 2019-02-04 PROCEDURE — 63600175 PHARM REV CODE 636 W HCPCS: Performed by: INTERNAL MEDICINE

## 2019-02-04 PROCEDURE — 63600175 PHARM REV CODE 636 W HCPCS: Performed by: NURSE PRACTITIONER

## 2019-02-04 PROCEDURE — 80197 ASSAY OF TACROLIMUS: CPT

## 2019-02-04 PROCEDURE — 94761 N-INVAS EAR/PLS OXIMETRY MLT: CPT

## 2019-02-04 PROCEDURE — 36415 COLL VENOUS BLD VENIPUNCTURE: CPT

## 2019-02-04 PROCEDURE — S5571 INSULIN DISPOS PEN 3 ML: HCPCS | Performed by: NURSE PRACTITIONER

## 2019-02-04 PROCEDURE — 25000003 PHARM REV CODE 250: Performed by: INTERNAL MEDICINE

## 2019-02-04 PROCEDURE — 80053 COMPREHEN METABOLIC PANEL: CPT

## 2019-02-04 PROCEDURE — 11000001 HC ACUTE MED/SURG PRIVATE ROOM

## 2019-02-04 RX ORDER — MYCOPHENOLATE MOFETIL 250 MG/1
500 CAPSULE ORAL 2 TIMES DAILY
Status: CANCELLED | OUTPATIENT
Start: 2019-02-04

## 2019-02-04 RX ORDER — TACROLIMUS 0.5 MG/1
0.5 CAPSULE ORAL 2 TIMES DAILY
Status: CANCELLED | OUTPATIENT
Start: 2019-02-04

## 2019-02-04 RX ORDER — OXYCODONE HYDROCHLORIDE 5 MG/1
5 TABLET ORAL EVERY 4 HOURS PRN
Status: CANCELLED | OUTPATIENT
Start: 2019-02-04

## 2019-02-04 RX ORDER — OXYCODONE HYDROCHLORIDE 5 MG/1
10 TABLET ORAL EVERY 6 HOURS PRN
Status: CANCELLED | OUTPATIENT
Start: 2019-02-04

## 2019-02-04 RX ORDER — AMOXICILLIN 250 MG
1 CAPSULE ORAL 2 TIMES DAILY
Status: CANCELLED | OUTPATIENT
Start: 2019-02-04

## 2019-02-04 RX ORDER — IBUPROFEN 200 MG
16 TABLET ORAL
Status: CANCELLED | OUTPATIENT
Start: 2019-02-04

## 2019-02-04 RX ORDER — INSULIN ASPART 100 [IU]/ML
0-5 INJECTION, SOLUTION INTRAVENOUS; SUBCUTANEOUS
Status: CANCELLED | OUTPATIENT
Start: 2019-02-04

## 2019-02-04 RX ORDER — AMOXICILLIN AND CLAVULANATE POTASSIUM 875; 125 MG/1; MG/1
1 TABLET, FILM COATED ORAL EVERY 12 HOURS
Status: CANCELLED | OUTPATIENT
Start: 2019-02-04

## 2019-02-04 RX ORDER — LANOLIN ALCOHOL/MO/W.PET/CERES
800 CREAM (GRAM) TOPICAL DAILY
Status: CANCELLED | OUTPATIENT
Start: 2019-02-05

## 2019-02-04 RX ORDER — IBUPROFEN 200 MG
24 TABLET ORAL
Status: CANCELLED | OUTPATIENT
Start: 2019-02-04

## 2019-02-04 RX ORDER — AMOXICILLIN AND CLAVULANATE POTASSIUM 875; 125 MG/1; MG/1
1 TABLET, FILM COATED ORAL EVERY 12 HOURS
Status: DISCONTINUED | OUTPATIENT
Start: 2019-02-04 | End: 2019-02-06 | Stop reason: HOSPADM

## 2019-02-04 RX ORDER — GLUCAGON 1 MG
1 KIT INJECTION
Status: CANCELLED | OUTPATIENT
Start: 2019-02-04

## 2019-02-04 RX ORDER — FAMOTIDINE 20 MG/1
20 TABLET, FILM COATED ORAL 2 TIMES DAILY
Status: CANCELLED | OUTPATIENT
Start: 2019-02-04

## 2019-02-04 RX ORDER — ERGOCALCIFEROL 1.25 MG/1
50000 CAPSULE ORAL
Status: CANCELLED | OUTPATIENT
Start: 2019-02-05

## 2019-02-04 RX ORDER — ATORVASTATIN CALCIUM 40 MG/1
40 TABLET, FILM COATED ORAL DAILY
Status: CANCELLED | OUTPATIENT
Start: 2019-02-05

## 2019-02-04 RX ORDER — ACETAMINOPHEN 325 MG/1
650 TABLET ORAL EVERY 8 HOURS PRN
Status: CANCELLED | OUTPATIENT
Start: 2019-02-04

## 2019-02-04 RX ORDER — SODIUM CHLORIDE 0.9 % (FLUSH) 0.9 %
5 SYRINGE (ML) INJECTION
Status: CANCELLED | OUTPATIENT
Start: 2019-02-04

## 2019-02-04 RX ORDER — LORAZEPAM 1 MG/1
1 TABLET ORAL 2 TIMES DAILY
Status: CANCELLED | OUTPATIENT
Start: 2019-02-04

## 2019-02-04 RX ORDER — ENOXAPARIN SODIUM 100 MG/ML
40 INJECTION SUBCUTANEOUS EVERY 24 HOURS
Status: CANCELLED | OUTPATIENT
Start: 2019-02-04

## 2019-02-04 RX ORDER — ONDANSETRON 2 MG/ML
4 INJECTION INTRAMUSCULAR; INTRAVENOUS EVERY 8 HOURS PRN
Status: CANCELLED | OUTPATIENT
Start: 2019-02-04

## 2019-02-04 RX ORDER — ACETAMINOPHEN 325 MG/1
650 TABLET ORAL EVERY 4 HOURS PRN
Status: CANCELLED | OUTPATIENT
Start: 2019-02-04

## 2019-02-04 RX ORDER — TACROLIMUS 1 MG/1
1 CAPSULE ORAL 2 TIMES DAILY
Status: CANCELLED | OUTPATIENT
Start: 2019-02-04

## 2019-02-04 RX ADMIN — MYCOPHENOLATE MOFETIL 500 MG: 250 CAPSULE ORAL at 09:02

## 2019-02-04 RX ADMIN — AMOXICILLIN AND CLAVULANATE POTASSIUM 1 TABLET: 875; 125 TABLET, FILM COATED ORAL at 02:02

## 2019-02-04 RX ADMIN — STANDARDIZED SENNA CONCENTRATE AND DOCUSATE SODIUM 1 TABLET: 8.6; 5 TABLET, FILM COATED ORAL at 09:02

## 2019-02-04 RX ADMIN — AMOXICILLIN AND CLAVULANATE POTASSIUM 1 TABLET: 875; 125 TABLET, FILM COATED ORAL at 09:02

## 2019-02-04 RX ADMIN — INSULIN ASPART 1 UNITS: 100 INJECTION, SOLUTION INTRAVENOUS; SUBCUTANEOUS at 12:02

## 2019-02-04 RX ADMIN — MYCOPHENOLATE MOFETIL 500 MG: 250 CAPSULE ORAL at 10:02

## 2019-02-04 RX ADMIN — MAGNESIUM OXIDE TAB 400 MG (241.3 MG ELEMENTAL MG) 800 MG: 400 (241.3 MG) TAB at 09:02

## 2019-02-04 RX ADMIN — INSULIN ASPART 1 UNITS: 100 INJECTION, SOLUTION INTRAVENOUS; SUBCUTANEOUS at 09:02

## 2019-02-04 RX ADMIN — LEVOTHYROXINE SODIUM 75 MCG: 50 TABLET ORAL at 05:02

## 2019-02-04 RX ADMIN — FAMOTIDINE 20 MG: 20 TABLET, FILM COATED ORAL at 10:02

## 2019-02-04 RX ADMIN — LORAZEPAM 1 MG: 1 TABLET ORAL at 09:02

## 2019-02-04 RX ADMIN — TACROLIMUS 1 MG: 1 CAPSULE ORAL at 10:02

## 2019-02-04 RX ADMIN — LORAZEPAM 1 MG: 1 TABLET ORAL at 10:02

## 2019-02-04 RX ADMIN — TACROLIMUS 0.5 MG: 0.5 CAPSULE ORAL at 10:02

## 2019-02-04 RX ADMIN — INSULIN DETEMIR 23 UNITS: 100 INJECTION, SOLUTION SUBCUTANEOUS at 12:02

## 2019-02-04 RX ADMIN — TACROLIMUS 0.5 MG: 0.5 CAPSULE ORAL at 06:02

## 2019-02-04 RX ADMIN — INSULIN DETEMIR 23 UNITS: 100 INJECTION, SOLUTION SUBCUTANEOUS at 09:02

## 2019-02-04 RX ADMIN — ATORVASTATIN CALCIUM 40 MG: 40 TABLET, FILM COATED ORAL at 10:02

## 2019-02-04 RX ADMIN — TACROLIMUS 1 MG: 1 CAPSULE ORAL at 09:02

## 2019-02-04 RX ADMIN — CEFTRIAXONE 1 G: 1 INJECTION, SOLUTION INTRAVENOUS at 12:02

## 2019-02-04 RX ADMIN — FAMOTIDINE 20 MG: 20 TABLET, FILM COATED ORAL at 09:02

## 2019-02-04 NOTE — TELEPHONE ENCOUNTER
Late entry from 2/2/19 at 12:00:  Urine culture sensitivities reviewed with Dr. Jordan.  As per Dr. Jordan order, patient is to begin Augmentin 875 mg twice per day for 5 days.  Rx called into [facility] in [place].  Patient called and informed of above.  Verbalized understanding.   Noted. Letter sent.

## 2019-02-04 NOTE — CONSULTS
"[facility] [place]  Neurology  Consult Note    Patient Name: Andra Newell  MRN: [id number]  Admission Date: 2/3/2019  Hospital Length of Stay: 1 days  Code Status: Full Code   Attending Provider:   Consulting Provider: GHULAM Mclaughlin  Primary Care Physician: Gita Cohen MD  Principal Problem:Disequilibrium    Consults  Subjective:     Chief Complaint:  Dizziness     HPI:  59 yo lady with PMH of kidney transplant on 1/14/19 and DM who is being admitted to the hospital medicine service today out of concern for cerebellar stroke. Pt reports being in her normal state of health until ~ 2300 last night when she woke up and felt especially unsteady while walking to the bathroom. She didn't think much of it at the time and was able to make it to/from the bathroom without incident. However, this AM on rising she reports her symptoms continued.  Given persistence pt was presented to the ED for evaluation. She does note similar c/o just after her KTx.     Pt denies syncopal episode. Denies shaking/tongue biting. Denies fever/chills. Denies CP. Denies new asymmetrical weakness. She states that she did that she was "listing" a bit the left. But has had three previous surgeries to the left knee. Denies HA or preceding aura-like symptoms.      Of note, pt has recently been treated with Dapsone for UTI beginning 1/28/2019. Yesterday she apparently was switched to Augmentin.     She tells me that she is feeling now back to her baseline and that she is going to be transferred to [facility] where her kidney doctors are. She does not want me to order any more tests at this time.        CT head:   Negative head CT.      CUS:   No evidence of a hemodynamically significant carotid bifurcation stenosis.      ECHO: pending            Past Medical History:   Diagnosis Date    Anemia     Anemia in chronic kidney disease, on chronic dialysis 7/12/2017    Arthritis     Asthma     allergic airway    Colon " polyp     Depression     Diabetes mellitus     Diverticulosis     Eosinophilia     ESRD (end stage renal disease)     stage V, due for living donor 9/2018    ESRD on dialysis     GERD (gastroesophageal reflux disease)     Gout     Gout     Hyperlipidemia     Hypertension     Low back pain     MRSA carrier     Obesity     Renal disorder     Rotator cuff syndrome--left     Thyroid disease     Ulnar neuropathy of right upper extremity     Uncontrolled type 2 diabetes mellitus with hyperglycemia        Past Surgical History:   Procedure Laterality Date    ACHILLES TENDON SURGERY Left May 2015    ARTHROSCOPY, HIP Left 10/3/2013    Per[facility]dez [facility] at [facility] OR    ARTHROSCOPY-HIP WITH TROCHANTERIC BURSECTOMY Left 10/3/2013    Per[facility] at [facility] OR    ARTHROSCOPY-SHOULDER Left 11/24/2015    [facility] at [facility] OR    ARTHROSCOPY-SHOULDER WITH SUBACROMIAL DECOMPRESSION Left 7/12/2016    [facility] at [facility] OR    BACK SURGERY      CHOLECYSTECTOMY      COLONOSCOPY N/A 9/6/2017    Performed [facility] at [facility] ENDO    DEBRIDEMENT-SHOULDER-ARTHROSCOPIC Left 7/12/2016    [facility] at [facility] OR    DEBRIDEMENT-SHOULDER-ARTHROSCOPIC; LABRAL Left 11/24/2015    Per[facility] at [facility] OR    DIALYSIS FISTULA CREATION  12/2017    FEMOROPLASTY, ARTHROSCOPIC Left 10/3/2013    [facility] at [facility] OR    HEMORRHOID SURGERY      INJECTION-AMNIOX Left 7/12/2016    Per[facility] at [facility] OR    JOINT REPLACEMENT      left    KNEE SURGERY      LYSIS-ADHESION Left 11/24/2015    Per[facility] at [facility] OR    REPAIR ROTATOR CUFF ARTHROSCOPIC Left 7/12/2016    Per[facility] at [facility] OR    REPAIR-TENDON-BICEP Left 11/24/2015    [facility] at [facility] OR    SHOULDER ARTHROSCOPY      SHOULDER SURGERY       "TRANSPLANT, KIDNEY N/A 1/14/2019    Per[facility] at [facility] OR [address] [place] [address]    UPPER GASTROINTESTINAL ENDOSCOPY  ~2013     Kirt       Review of patient's allergies indicates:   Allergen Reactions    Torsemide Diarrhea     Diarrhea stopped once discontinued med    Codeine Itching     Can take oxycodone and hydrocodone with Benadryl       Current Neurological Medications:reviewed     No current facility-administered medications on file prior to encounter.      Current Outpatient Medications on File Prior to Encounter   Medication Sig    atorvastatin (LIPITOR) 40 MG tablet TAKE ONE TABLET BY MOUTH ONCE DAILY    famotidine (PEPCID) 20 MG tablet Take 1 tablet (20 mg total) by mouth 2 (two) times daily.    levothyroxine (SYNTHROID) 75 MCG tablet TAKE ONE TABLET BY MOUTH ONCE DAILY    LORazepam (ATIVAN) 2 MG Tab Take 1 tablet (2 mg total) by mouth 2 (two) times daily. (Patient taking differently: Take 1 mg by mouth 2 (two) times daily. )    magnesium oxide (MAG-OX) 400 mg (241.3 mg magnesium) tablet Take 2 tablets (800 mg total) by mouth once daily.    multivitamin (THERAGRAN) tablet Take 1 tablet by mouth once daily.    mycophenolate (CELLCEPT) 250 mg Cap Take 2 capsules (500 mg total) by mouth 2 (two) times daily.    SITagliptin (JANUVIA) 100 MG Tab Take 1 tablet (100 mg total) by mouth once daily.    tacrolimus (PROGRAF) 0.5 MG Cap Take 3 capsules (1.5 mg total) by mouth every morning AND 3 capsules (1.5 mg total) every evening. Z94.0 kidney transplant.    VIIBRYD 40 mg Tab tablet TAKE ONE TABLET BY MOUTH ONCE DAILY    BD ULTRA-FINE MINI PEN NEEDLE 31 gauge x 3/16" Ndle USE ONE NEEDLE ONCE DAILY    ergocalciferol (ERGOCALCIFEROL) 50,000 unit Cap Take 1 capsule (50,000 Units total) by mouth every 7 days. Take on Tues    insulin (BASAGLAR KWIKPEN U-100 INSULIN) glargine 100 units/mL (3mL) SubQ pen Inject 16 Units into the skin every evening. (Patient taking differently: Inject 20 Units into " the skin every evening. )    INV acyclovir/placebo capsule Take 1 capsule (400 mg total) by mouth every 12 (twelve) hours.  For investigational use only. Protocol Mk 8228-002    INV MK-8228/placebo 480 mg oral tablet Take 1 tablet (480 mg total) by mouth once daily. FOR INVESTIGATIONAL USE ONLY. Patient Study# [id number]. Protocol: MK 8228-002    INV valganciclovir/placebo tablet Take 2 tablets (900 mg total) by mouth once daily.  FOR INVESTIGATIONAL USE ONLY. Protocol: MK-8228-002      Family History     Problem Relation (Age of Onset)    Alzheimer's disease Mother    COPD Maternal Aunt    Diabetes Mother, Sister, Maternal Grandmother, Maternal Aunt    Heart disease Father, Brother, Maternal Aunt    Hyperlipidemia Mother    Hypertension Maternal Grandmother, Maternal Aunt    Lupus Sister    No Known Problems Son, Paternal Grandmother, Paternal Grandfather, Maternal Uncle, Paternal Aunt, Paternal Uncle    Ovarian cancer Sister    Stroke Father    Thyroid disease Mother        Tobacco Use    Smoking status: Never Smoker    Smokeless tobacco: Never Used   Substance and Sexual Activity    Alcohol use: No    Drug use: No    Sexual activity: No     Review of Systems   Constitutional: Negative.    HENT: Negative.    Eyes: Negative.    Respiratory: Negative.    Cardiovascular: Negative.    Gastrointestinal: Negative.    Endocrine: Negative.    Genitourinary: Negative.    Musculoskeletal: Negative.    Skin: Negative.    Allergic/Immunologic: Negative.    Neurological: Negative.    Hematological: Negative.    Psychiatric/Behavioral: Negative.      Objective:     Vital Signs (Most Recent):  Temp: 97 °F (36.1 °C) (02/04/19 0409)  Pulse: 88 (02/04/19 0409)  Resp: 17 (02/04/19 0409)  BP: 128/61 (02/04/19 0409)  SpO2: 95 % (02/04/19 0842) Vital Signs (24h Range):  Temp:  [97 °F (36.1 °C)-98.3 °F (36.8 °C)] 97 °F (36.1 °C)  Pulse:  [] 88  Resp:  [16-18] 17  SpO2:  [92 %-95 %] 95 %  BP: (121-150)/(57-73) 128/61      Weight: 99.3 kg (219 lb)  Body mass index is 34.3 kg/m².    Physical Exam   Constitutional: She is oriented to person, place, and time. She appears well-developed and well-nourished.   HENT:   Head: Atraumatic.   Eyes: EOM are normal.   Cardiovascular: Normal rate.   Pulmonary/Chest: Effort normal.   Abdominal: Soft.   Musculoskeletal: Normal range of motion.   Neurological: She is alert and oriented to person, place, and time. She has a normal Finger-Nose-Finger Test.   Reflex Scores:       Tricep reflexes are 2+ on the right side and 2+ on the left side.       Bicep reflexes are 2+ on the right side and 2+ on the left side.       Brachioradialis reflexes are 2+ on the right side and 2+ on the left side.       Patellar reflexes are 2+ on the right side and 2+ on the left side.       Achilles reflexes are 2+ on the right side and 2+ on the left side.  Skin: Skin is warm and dry.   Psychiatric: She has a normal mood and affect.       NEUROLOGICAL EXAMINATION:     MENTAL STATUS   Oriented to person, place, and time.   Level of consciousness: alert    CRANIAL NERVES   Cranial nerves II through XII intact.     CN III, IV, VI   Extraocular motions are normal.     MOTOR EXAM     Strength   Right neck flexion: 5/5  Left neck flexion: 5/5  Right neck extension: 5/5  Left neck extension: 5/5  Right deltoid: 5/5  Left deltoid: 5/5  Right biceps: 5/5  Left biceps: 5/5  Right triceps: 5/5  Left triceps: 5/5  Right wrist flexion: 5/5  Left wrist flexion: 5/5  Right wrist extension: 5/5  Left wrist extension: 5/5  Right interossei: 5/5  Left interossei: 5/5  Right abdominals: 5/5  Left abdominals: 5/5  Right iliopsoas: 5/5  Left iliopsoas: 5/5  Right quadriceps: 5/5  Left quadriceps: 5/5  Right hamstrin/5  Left hamstrin/5  Right glutei: 5/5  Left glutei: 5/5  Right anterior tibial: 5/5  Left anterior tibial: 5/5  Right posterior tibial: 5/5  Left posterior tibial: 5/5  Right peroneal: 5/5  Left peroneal: 5/5  Right  gastroc: 5/5  Left gastroc: 5/5    REFLEXES     Reflexes   Right brachioradialis: 2+  Left brachioradialis: 2+  Right biceps: 2+  Left biceps: 2+  Right triceps: 2+  Left triceps: 2+  Right patellar: 2+  Left patellar: 2+  Right achilles: 2+  Left achilles: 2+  Right : 2+  Left : 2+    SENSORY EXAM   Light touch normal.     GAIT AND COORDINATION      Coordination   Finger to nose coordination: normal    Tremor   Resting tremor: absent  Intention tremor: absent      Significant Labs: reviewed     Significant Imaging: reviewed     Assessment and Plan:     Active Diagnoses:    Diagnosis Date Noted POA    PRINCIPAL PROBLEM:  Disequilibrium [R42] 02/03/2019 Yes    Status post living-donor kidney transplantation [Z94.0] 01/15/2019 Not Applicable    BMI 36.0-36.9,adult [Z68.36] 11/23/2018 Not Applicable    Anemia in chronic kidney disease, on chronic dialysis [N18.6, D63.1, Z99.2] 07/12/2017 Not Applicable    HLD (hyperlipidemia) [E78.5] 07/12/2017 Yes     Chronic    Hypothyroidism [E03.9] 03/27/2017 Yes    Depression [F32.9] 01/21/2015 Yes    Uncontrolled type 2 diabetes mellitus with stage 5 chronic kidney disease not on chronic dialysis, with long-term current use of insulin [E11.22, E11.65, N18.5, Z79.4] 09/27/2013 Not Applicable      Problems Resolved During this Admission:       VTE Risk Mitigation (From admission, onward)        Ordered     enoxaparin injection 40 mg  Daily      02/03/19 2150     IP VTE HIGH RISK PATIENT  Once      02/03/19 2150        Disequilibrium - resolved   Status post kidney transplant   Diabetes    CT brain negative acute  CUS shows no stenosis F/U echo  Pt wishes to be transferred to [facility] and have MRI brain done there    Stroke education was provided.  If patient has acute neurological changes including weakness, confusion, speech changes, facial droop, difficulty walking, and sensory changes immediately call 911.  Follow up Neurology in 2 weeks at  [phone].  Medication side effects discussed with the patient and/or caregiver.          Thank you for your consult.     GHULAM Mclaughlin  [facility] I, Dr. Freedom August, have personally seen and examined the patient with my advanced provider and agree with above. I personally did a focused exam, and reviewed all necessary clinical information. I discussed my management plan with my NP and agree with above. Patient at baseline. F/U neurology in 2-3 weeks.

## 2019-02-04 NOTE — PLAN OF CARE
Cm completed the assessment at pt's bedside.  Pt arrived from home, she has a caregiver- Any.  Pt has caregiver at home for assistance.  PCP is Dr. Cohen.  Insurance verified as Medicare and Medicaid. Pt is a diabetic, denies dialysis and coumadin.  Disposition:  Pt informed me that she will be transferred to [facility]. No other needs verbalized at this time.       02/04/19 0930   Discharge Assessment   Assessment Type Discharge Planning Assessment   Confirmed/corrected address and phone number on facesheet? Yes   Assessment information obtained from? Patient   Prior to hospitilization cognitive status: Alert/Oriented   Prior to hospitalization functional status: Assistive Equipment;Needs Assistance   Current cognitive status: Alert/Oriented   Current Functional Status: Assistive Equipment;Needs Assistance   Facility Arrived From: home   Lives With other (see comments);friend(s)  (caregiver)   Able to Return to Prior Arrangements yes   Is patient able to care for self after discharge? Yes   Who are your caregiver(s) and their phone number(s)? Any Alvarez - [phone]   Patient's perception of discharge disposition home or selfcare   Readmission Within the Last 30 Days no previous admission in last 30 days   Patient currently being followed by outpatient case management? No   Patient currently receives any other outside agency services? No   Equipment Currently Used at Home walker, rolling;cane, straight;shower chair;power chair;glucometer  (scooter)   Do you have any problems affording any of your prescribed medications? No   Is the patient taking medications as prescribed? yes  ([facility])   Does the patient have transportation home? Yes   Transportation Anticipated family or friend will provide   Dialysis Name and Scheduled days na   Does the patient receive services at the Coumadin Clinic? No   Discharge Plan A Home with family   Patient/Family in Agreement with Plan yes

## 2019-02-04 NOTE — ED NOTES
Pt family very upset on length of time to get to room.  Report called at 1835 - send up after shift change.  Family stated if pt not up in room by 1930 they are leaving AMA and going to main campus.

## 2019-02-04 NOTE — NURSING
Pt stated they take 23 units of long acting insulin nightly only 16 units were ordered. notified Adenike Patrick NP that I updated all pts medications upon admit to the unit and that pt stated their partner gave them all their night time meds for today.  NP stated these orders have been reconciled already by dr castro and ordered the 23 units for the pt for tonight.

## 2019-02-04 NOTE — PLAN OF CARE
Problem: Adult Inpatient Plan of Care  Goal: Plan of Care Review  Outcome: Ongoing (interventions implemented as appropriate)  Plan of care reviewed with pt, pt verbalized understanding.  PIV clean dry and intact. IVF infusing per order. IV abx infusing per order. Hourly/Q2 hourly rounds completed throughout shift. Blood glucose monitored AC/HS and treated with SSI. Pt denies need for pain medication. Neuro checks q 4, neuro intact. Telemetry continues to be monitored. Comfort level established.  Up with assist to restroom. Repositions self independently..   Pt has remained free from fall/injury. No new skin breakdown noted. Bed in lowest position, brakes locked, call light within reach, SR^x2 for pt safety. Needs attended to, will continue to monitor.

## 2019-02-04 NOTE — ED NOTES
Care assumed. Pt awake, alert and oriented. Resp even and unlabored. Awaiting further orders and dispo.

## 2019-02-04 NOTE — PROGRESS NOTES
Progress Note  Hospital Medicine  Patient Name:Andra Newell  MRN:  [id number]  Patient Class: IP- Inpatient  Admit Date: 2/3/2019  Length of Stay: 1 days  Expected Discharge Date:   Attending Physician: Patrick Jordan MD  Primary Care Provider:  Gita Cohen MD    SUBJECTIVE:     Principal Problem: Disequilibrium  Initial history of present illness: 57 yo F pmhx living kidney transplant on 1/14/19 and DM who is being admitted to the hospital medicine service today out of concern for cerebellar stroke. Pt reports being in her NSOH until ~ 2300 last night when she woke up and felt especially dizzy while walking to the bathroom. She didn't think much of it at the time and was able to make it to/from the bathroom without incident. However, this AM on rising she reports her symptoms continued.  Given persistence pt was presented to the ED for evaluation. She does note similar c/o just after her KTx.     Pt denies syncopal episode. Denies shaking/tongue biting. Denies fever/chills. Denies CP. Denies new asymmetrical weakness. Denies HA or preceding aura-like symptoms.      Of note, pt has recently been treated with Dapsone for UTI beginning 1/28/2019. Yesterday she apparently was switched to Augmentin yesterday.    PMH/PSH/SH/FH/Meds: reviewed.    Symptoms/Review of Systems: Patient is feeling better, anxious to be transferred [facility]. No seizure or focal neurological complaints, only agreeable to have MRI brain at [facility] not at our facility. No shortness of breath, cough, chest pain or headache, fever or abdominal pain.     Diet:  Adequate intake.    Activity level: Normal.    Pain:  Patient reports no pain.       OBJECTIVE:   Vital Signs (Most Recent):      Temp: 97 °F (36.1 °C) (02/04/19 0409)  Pulse: 88 (02/04/19 0409)  Resp: 17 (02/04/19 0409)  BP: 128/61 (02/04/19 0409)  SpO2: 95 % (02/04/19 0842)       Vital Signs Range (Last 24H):  Temp:  [97 °F (36.1 °C)-98.3 °F (36.8 °C)]   Pulse:  []    Resp:  [16-18]   BP: (121-150)/(57-73)   SpO2:  [92 %-95 %]     Weight: 99.3 kg (219 lb)  Body mass index is 34.3 kg/m².    Intake/Output Summary (Last 24 hours) at 2/4/2019 1054  Last data filed at 2/4/2019 0400  Gross per 24 hour   Intake 290 ml   Output --   Net 290 ml     Physical Examination:  Constitutional: She is oriented to person, place, and time. She appears well-developed and well-nourished.   HENT:   Head: Normocephalic and atraumatic.   Eyes: EOM are normal. Pupils are equal, round, and reactive to light.   Neck: Normal range of motion. Neck supple.   Cardiovascular: Normal rate, regular rhythm and normal heart sounds.   Pulmonary/Chest: Effort normal and breath sounds normal. No respiratory distress.   Abdominal: Soft. Bowel sounds are normal. She exhibits no distension. There is no tenderness.   Musculoskeletal: Normal range of motion. She exhibits no edema.   Neurological: She is alert and oriented to person, place, and time. No cranial nerve deficit or sensory deficit. She displays no seizure activity. Gait abnormal. Coordination normal.   4/5  b/l  4/5 BLE  + Romberg   Skin: Skin is warm and dry.   Surgical incision intact   CRANIAL NERVES   CN III, IV, VI   Pupils are equal, round, and reactive to light.  Extraocular motions are normal.     CBC:  Recent Labs   Lab 01/30/19  0810 02/01/19  0826 02/03/19  1114   WBC 4.46 3.87* 3.20*   RBC 3.15* 3.16* 3.00*   HGB 9.5* 9.2* 8.9*   HCT 30.3* 29.9* 27.2*    214 224   MCV 96 95 91   MCH 30.2 29.1 29.6   MCHC 31.4* 30.8* 32.6   BMP  Recent Labs   Lab 01/30/19  0810 02/01/19  0826 02/03/19  1114 02/04/19  0505   * 195* 244* 190*    138 138 138   K 5.2* 5.0 4.9 4.3    105 103 106   CO2 25 27 25 23   BUN 21* 20 19 19   CREATININE 1.3 1.2 1.2 1.2   CALCIUM 9.7 9.5 9.4 9.1   MG 1.6 1.5* 1.6  --       Diagnostic Results:  Microbiology Results (last 7 days)     ** No results found for the last 168 hours. **         CT head:  Negative head CT.    US Carotid: Pending    Assessment/Plan:     * Disequilibrium     Etiology uncertain, ? 2/2 cerebellar CVA v medication SE  Will check MRI  Follwo AZALEA and carotid doppler  Consult neurology. Discussed with Dr. Hill.  ASA       Status post living-donor kidney transplantation     S/p KTx 1/2019  Tx coordinator alerted by ED: no indication to transfer to [facility]  Continue immunosuppression       BMI 36.0-36.9,adult      Body mass index is 34.3 kg/m². Morbid obesity complicates all aspects of disease management from diagnostic modalities to treatment. Weight loss encouraged and health benefits explained to patient.     HLD (hyperlipidemia)     Chronic issue. Continue home meds     Anemia in chronic kidney disease, on chronic dialysis         Hypothyroidism     Chronic issues. Continue home meds      Depression     Chronic issue. Continue home medications      Uncontrolled type 2 diabetes mellitus with stage 5 chronic kidney disease not on chronic dialysis, with long-term current use of insulin     Continue basal insulin  SSI with accuchecks ACHS  ADA diet     Discussed with Dr. Hailey Cabral at Transplant surgery who accepted the transfer, await bed availability. Discussed with family members.     VTE Risk Mitigation (From admission, onward)        Ordered     enoxaparin injection 40 mg  Daily      02/03/19 2150     IP VTE HIGH RISK PATIENT  Once      02/03/19 2150        Patrick Jordan MD  Department of Hospital Medicine   [facility]

## 2019-02-04 NOTE — PLAN OF CARE
02/04/19 1724   Discharge Assessment   Assessment Type Discharge Planning Reassessment   CM placed call to [facility] @ [phone] and spoke to Sabrina to request transfer to [facility]. Patient is recent renal transplant. Family member spoke to transplant coordinator, Samson Hendricks [phone] and he told her that they wanted the patient transferred to [facility] main. CM will followup.     200pm Dr Jordan spoke to Dr. Cabral and patient will be accepted in transfer but they are waiting for a bed.    300pm CM informed patient and family that she has been accepted and will transfer once bed is available.

## 2019-02-04 NOTE — PLAN OF CARE
Problem: Adult Inpatient Plan of Care  Goal: Plan of Care Review  Outcome: Ongoing (interventions implemented as appropriate)  02 saturation 95% on room air.

## 2019-02-05 ENCOUNTER — PATIENT MESSAGE (OUTPATIENT)
Dept: TRANSPLANT | Facility: CLINIC | Age: 59
End: 2019-02-05

## 2019-02-05 LAB
ALBUMIN SERPL BCP-MCNC: 3.3 G/DL
ALP SERPL-CCNC: 257 U/L
ALT SERPL W/O P-5'-P-CCNC: 28 U/L
ANION GAP SERPL CALC-SCNC: 9 MMOL/L
AORTIC ROOT ANNULUS: 3.01 CM
AORTIC VALVE CUSP SEPERATION: 1.94 CM
AST SERPL-CCNC: 20 U/L
AV INDEX (PROSTH): 0.61
AV MEAN GRADIENT: 13.04 MMHG
AV PEAK GRADIENT: 29.59 MMHG
AV VALVE AREA: 2.55 CM2
AV VELOCITY RATIO: 0.55
BILIRUB SERPL-MCNC: 0.5 MG/DL
BSA FOR ECHO PROCEDURE: 2.17 M2
BUN SERPL-MCNC: 20 MG/DL
CALCIUM SERPL-MCNC: 9.2 MG/DL
CHLORIDE SERPL-SCNC: 108 MMOL/L
CO2 SERPL-SCNC: 23 MMOL/L
CREAT SERPL-MCNC: 1.2 MG/DL
CV ECHO LV RWT: 0.4 CM
DOP CALC AO PEAK VEL: 2.72 M/S
DOP CALC AO VTI: 44.16 CM
DOP CALC LVOT AREA: 4.19 CM2
DOP CALC LVOT DIAMETER: 2.31 CM
DOP CALC LVOT PEAK VEL: 1.49 M/S
DOP CALC LVOT STROKE VOLUME: 112.72 CM3
DOP CALCLVOT PEAK VEL VTI: 26.91 CM
E WAVE DECELERATION TIME: 252.08 MSEC
E/A RATIO: 0.63
E/E' RATIO: 7.5
ECHO LV POSTERIOR WALL: 1 CM (ref 0.6–1.1)
EST. GFR  (AFRICAN AMERICAN): 57 ML/MIN/1.73 M^2
EST. GFR  (NON AFRICAN AMERICAN): 49 ML/MIN/1.73 M^2
FRACTIONAL SHORTENING: 26 % (ref 28–44)
GLUCOSE SERPL-MCNC: 216 MG/DL
INTERVENTRICULAR SEPTUM: 0.99 CM (ref 0.6–1.1)
IVRT: 0.12 MSEC
LEFT ATRIUM SIZE: 3.79 CM
LEFT INTERNAL DIMENSION IN SYSTOLE: 3.69 CM (ref 2.1–4)
LEFT VENTRICLE DIASTOLIC VOLUME INDEX: 55.86 ML/M2
LEFT VENTRICLE DIASTOLIC VOLUME: 117.39 ML
LEFT VENTRICLE MASS INDEX: 85.4 G/M2
LEFT VENTRICLE SYSTOLIC VOLUME INDEX: 27.4 ML/M2
LEFT VENTRICLE SYSTOLIC VOLUME: 57.6 ML
LEFT VENTRICULAR INTERNAL DIMENSION IN DIASTOLE: 4.98 CM (ref 3.5–6)
LEFT VENTRICULAR MASS: 179.57 G
LV LATERAL E/E' RATIO: 7.5
LV SEPTAL E/E' RATIO: 7.5
MV PEAK A VEL: 0.96 M/S
MV PEAK E VEL: 0.6 M/S
POCT GLUCOSE: 206 MG/DL (ref 70–110)
POCT GLUCOSE: 241 MG/DL (ref 70–110)
POCT GLUCOSE: 243 MG/DL (ref 70–110)
POTASSIUM SERPL-SCNC: 4.7 MMOL/L
PROT SERPL-MCNC: 6.4 G/DL
PULM VEIN A" WAVE DURATION": 90 MSEC
PV PEAK VELOCITY: 1.39 CM/S
RA PRESSURE: 3 MMHG
RIGHT VENTRICULAR END-DIASTOLIC DIMENSION: 3.03 CM
SODIUM SERPL-SCNC: 140 MMOL/L
TDI LATERAL: 0.08
TDI SEPTAL: 0.08
TDI: 0.08
TRICUSPID ANNULAR PLANE SYSTOLIC EXCURSION: 2.99 CM

## 2019-02-05 PROCEDURE — 11000001 HC ACUTE MED/SURG PRIVATE ROOM

## 2019-02-05 PROCEDURE — 36415 COLL VENOUS BLD VENIPUNCTURE: CPT

## 2019-02-05 PROCEDURE — 25000003 PHARM REV CODE 250: Performed by: INTERNAL MEDICINE

## 2019-02-05 PROCEDURE — 63600175 PHARM REV CODE 636 W HCPCS: Performed by: INTERNAL MEDICINE

## 2019-02-05 PROCEDURE — 80053 COMPREHEN METABOLIC PANEL: CPT

## 2019-02-05 RX ADMIN — INSULIN DETEMIR 23 UNITS: 100 INJECTION, SOLUTION SUBCUTANEOUS at 08:02

## 2019-02-05 RX ADMIN — MAGNESIUM OXIDE TAB 400 MG (241.3 MG ELEMENTAL MG) 800 MG: 400 (241.3 MG) TAB at 02:02

## 2019-02-05 RX ADMIN — MYCOPHENOLATE MOFETIL 500 MG: 250 CAPSULE ORAL at 08:02

## 2019-02-05 RX ADMIN — STANDARDIZED SENNA CONCENTRATE AND DOCUSATE SODIUM 1 TABLET: 8.6; 5 TABLET, FILM COATED ORAL at 08:02

## 2019-02-05 RX ADMIN — AMOXICILLIN AND CLAVULANATE POTASSIUM 1 TABLET: 875; 125 TABLET, FILM COATED ORAL at 08:02

## 2019-02-05 RX ADMIN — TACROLIMUS 0.5 MG: 0.5 CAPSULE ORAL at 08:02

## 2019-02-05 RX ADMIN — FAMOTIDINE 20 MG: 20 TABLET, FILM COATED ORAL at 08:02

## 2019-02-05 RX ADMIN — LORAZEPAM 1 MG: 1 TABLET ORAL at 08:02

## 2019-02-05 RX ADMIN — INSULIN ASPART 1 UNITS: 100 INJECTION, SOLUTION INTRAVENOUS; SUBCUTANEOUS at 08:02

## 2019-02-05 RX ADMIN — TACROLIMUS 1 MG: 1 CAPSULE ORAL at 08:02

## 2019-02-05 RX ADMIN — ATORVASTATIN CALCIUM 40 MG: 40 TABLET, FILM COATED ORAL at 08:02

## 2019-02-05 RX ADMIN — INSULIN ASPART 2 UNITS: 100 INJECTION, SOLUTION INTRAVENOUS; SUBCUTANEOUS at 05:02

## 2019-02-05 RX ADMIN — ERGOCALCIFEROL 50000 UNITS: 1.25 CAPSULE ORAL at 08:02

## 2019-02-05 RX ADMIN — LEVOTHYROXINE SODIUM 75 MCG: 50 TABLET ORAL at 05:02

## 2019-02-05 NOTE — PROGRESS NOTES
Progress Note  Hospital Medicine  Patient Name:Andra Newell  MRN:  [id number]  Patient Class: IP- Inpatient  Admit Date: 2/3/2019  Length of Stay: 2 days  Expected Discharge Date: 2/4/2019  Attending Physician: Patrick Jordan MD  Primary Care Provider:  Gita Cohen MD    SUBJECTIVE:     Principal Problem: Disequilibrium  Initial history of present illness: 59 yo F pmhx living kidney transplant on 1/14/19 and DM who is being admitted to the hospital medicine service today out of concern for cerebellar stroke. Pt reports being in her NSOH until ~ 2300 last night when she woke up and felt especially dizzy while walking to the bathroom. She didn't think much of it at the time and was able to make it to/from the bathroom without incident. However, this AM on rising she reports her symptoms continued.  Given persistence pt was presented to the ED for evaluation. She does note similar c/o just after her KTx.     Pt denies syncopal episode. Denies shaking/tongue biting. Denies fever/chills. Denies CP. Denies new asymmetrical weakness. Denies HA or preceding aura-like symptoms.      Of note, pt has recently been treated with Dapsone for UTI beginning 1/28/2019. Yesterday she apparently was switched to Augmentin yesterday.    PMH/PSH/SH/FH/Meds: reviewed.    Symptoms/Review of Systems: Patient is feeling better, anxious to be transferred to [facility] - transplant team. No seizure or focal neurological complaints, only agreeable to have MRI brain at [facility] not at our facility. No shortness of breath, cough, chest pain or headache, fever or abdominal pain. Reports improved dizziness.   Diet:  Adequate intake.    Activity level: Normal.    Pain:  Patient reports no pain.       OBJECTIVE:   Vital Signs (Most Recent):      Temp: 97.7 °F (36.5 °C) (02/05/19 0754)  Pulse: 77 (02/05/19 0754)  Resp: 16 (02/05/19 0754)  BP: 133/68 (02/05/19 0754)  SpO2: 95 % (02/05/19 0754)       Vital Signs Range (Last 24H):  Temp:  [97.4 °F (36.3  °C)-97.7 °F (36.5 °C)]   Pulse:  [77-89]   Resp:  [16-18]   BP: (115-154)/(53-79)   SpO2:  [94 %-95 %]     Weight: 99.3 kg (219 lb)  Body mass index is 34.3 kg/m².    Intake/Output Summary (Last 24 hours) at 2/5/2019 1014  Last data filed at 2/5/2019 0600  Gross per 24 hour   Intake 600 ml   Output --   Net 600 ml     Physical Examination:  Constitutional: She is oriented to person, place, and time. She appears well-developed and well-nourished.   HENT:   Head: Normocephalic and atraumatic.   Eyes: EOM are normal. Pupils are equal, round, and reactive to light.   Neck: Normal range of motion. Neck supple.   Cardiovascular: Normal rate, regular rhythm and normal heart sounds.   Pulmonary/Chest: Effort normal and breath sounds normal. No respiratory distress.   Abdominal: Soft. Bowel sounds are normal. She exhibits no distension. There is no tenderness.   Musculoskeletal: Normal range of motion. She exhibits no edema.   Neurological: She is alert and oriented to person, place, and time. No cranial nerve deficit or sensory deficit. She displays no seizure activity. Gait abnormal. Coordination normal.   4/5  b/l  4/5 BLE  + Romberg   Skin: Skin is warm and dry.   Surgical incision intact   CRANIAL NERVES   CN III, IV, VI   Pupils are equal, round, and reactive to light.  Extraocular motions are normal.     CBC:  Recent Labs   Lab 01/30/19  0810 02/01/19  0826 02/03/19  1114   WBC 4.46 3.87* 3.20*   RBC 3.15* 3.16* 3.00*   HGB 9.5* 9.2* 8.9*   HCT 30.3* 29.9* 27.2*    214 224   MCV 96 95 91   MCH 30.2 29.1 29.6   MCHC 31.4* 30.8* 32.6   BMP  Recent Labs   Lab 01/30/19  0810 02/01/19  0826 02/03/19  1114 02/04/19  0505 02/05/19  0541   * 195* 244* 190* 216*    138 138 138 140   K 5.2* 5.0 4.9 4.3 4.7    105 103 106 108   CO2 25 27 25 23 23   BUN 21* 20 19 19 20   CREATININE 1.3 1.2 1.2 1.2 1.2   CALCIUM 9.7 9.5 9.4 9.1 9.2   MG 1.6 1.5* 1.6  --   --       Diagnostic Results:  Microbiology  Results (last 7 days)     ** No results found for the last 168 hours. **         CT head: Negative head CT.    US Carotid: No evidence of a hemodynamically significant carotid bifurcation stenosis.    ECHO: Pending    Assessment/Plan:     * Disequilibrium     Etiology uncertain, ? 2/2 cerebellar CVA v medication SE  MRI brain pending.  Follwo AZALEA and carotid doppler  Consult neurology. Discussed with Dr. Hill.  ASA       Status post living-donor kidney transplantation     S/p KTx 1/2019  Tx coordinator alerted by ED: no indication to transfer to [facility]  Continue immunosuppression       BMI 36.0-36.9,adult      Body mass index is 34.3 kg/m². Morbid obesity complicates all aspects of disease management from diagnostic modalities to treatment. Weight loss encouraged and health benefits explained to patient.     HLD (hyperlipidemia)     Chronic issue. Continue home meds     Anemia in chronic kidney disease, on chronic dialysis         Hypothyroidism     Chronic issues. Continue home meds      Depression     Chronic issue. Continue home medications      Uncontrolled type 2 diabetes mellitus with stage 5 chronic kidney disease not on chronic dialysis, with long-term current use of insulin     Continue basal insulin  SSI with accuchecks ACHS  ADA diet     Awaiting transfer to [facility] pending bed availability.    VTE Risk Mitigation (From admission, onward)        Ordered     enoxaparin injection 40 mg  Daily      02/03/19 2150     IP VTE HIGH RISK PATIENT  Once      02/03/19 2150        Patrick Jordan MD  Department of Hospital Medicine   [facility]

## 2019-02-05 NOTE — PLAN OF CARE
Problem: Adult Inpatient Plan of Care  Goal: Plan of Care Review  Outcome: Revised  Pt is AAOx4.  PIV saline locked, no redness or swelling at site.  BG monitored, SSI given prn as ordered.  Incision CDI.  Cardiac monitoring in place, NSR.  VSS, in NAD, pt remains afebrile.  Pt remains free from injury.  Bed in low position, wheels locked, call light within reach.  Will continue to monitor.

## 2019-02-05 NOTE — PROGRESS NOTES
[facility]  Neurology  Progress Note    Patient Name: Andra Newell  MRN: [id number]  Admission Date: 2/3/2019  Hospital Length of Stay: 2 days  Code Status: Full Code   Attending Provider: Patrick Jordan MD  Primary Care Physician: Gita Cohen MD   Principal Problem:Disequilibrium    Subjective:     Interval History: Pt remains at [facility] due to still waiting on a bed at [facility]. She state that she is totally back to her baseline with no further gait issues, dizziness or balance issues. She continues to refuse to have MRI/A done at this time due to fear of claustrophobia.     CT head:   Negative head CT.        CUS:   No evidence of a hemodynamically significant carotid bifurcation stenosis.        ECHO: pending         Current Neurological Medications: reviewed    Current Facility-Administered Medications   Medication Dose Route Frequency Provider Last Rate Last Dose    acetaminophen tablet 650 mg  650 mg Oral Q4H PRN Frank Ricardo MD        acetaminophen tablet 650 mg  650 mg Oral Q8H PRN Frank Ricardo MD        amoxicillin-clavulanate 875-125mg per tablet 1 tablet  1 tablet Oral Q12H Patrick Jordan MD   1 tablet at 02/05/19 0836    atorvastatin tablet 40 mg  40 mg Oral Daily Frank Ricardo MD   40 mg at 02/05/19 0837    dextrose 50% injection 12.5 g  12.5 g Intravenous PRN Frank Ricardo MD        dextrose 50% injection 25 g  25 g Intravenous PRN Frank Ricardo MD        enoxaparin injection 40 mg  40 mg Subcutaneous Daily Frank Ricardo MD   Stopped at 02/04/19 1700    ergocalciferol capsule 50,000 Units  50,000 Units Oral Q7 Days Frank Ricardo MD   50,000 Units at 02/05/19 0837    famotidine tablet 20 mg  20 mg Oral BID Frank Ricardo MD   20 mg at 02/05/19 0837    glucagon (human recombinant) injection 1 mg  1 mg Intramuscular PRN Frank Ricardo MD        glucose chewable tablet 16 g  16 g  Oral PRN Frank Ricardo MD        glucose chewable tablet 24 g  24 g Oral PRN Frank Ricardo MD        insulin aspart U-100 pen 0-5 Units  0-5 Units Subcutaneous QID (AC + HS) PRN Frank Ricardo MD   2 Units at 02/05/19 0546    insulin detemir U-100 pen 23 Units  23 Units Subcutaneous QHS Adenike Patrick NP   23 Units at 02/04/19 2147    levothyroxine tablet 75 mcg  75 mcg Oral Before breakfast Frank Ricardo MD   75 mcg at 02/05/19 0546    LORazepam tablet 1 mg  1 mg Oral BID Frank Ricardo MD   1 mg at 02/05/19 0836    magnesium oxide tablet 800 mg  800 mg Oral Daily Frank Ricardo MD   800 mg at 02/04/19 0900    mycophenolate capsule 500 mg  500 mg Oral BID Frank Ricardo MD   500 mg at 02/05/19 0837    ondansetron injection 4 mg  4 mg Intravenous Q8H PRN Frank Ricardo MD        oxyCODONE immediate release tablet 10 mg  10 mg Oral Q6H PRN Frank Ricardo MD        oxyCODONE immediate release tablet 5 mg  5 mg Oral Q4H PRN Frank Ricardo MD        promethazine (PHENERGAN) 6.25 mg in dextrose 5 % 50 mL IVPB  6.25 mg Intravenous Q6H PRN Frank Ricardo MD        senna-docusate 8.6-50 mg per tablet 1 tablet  1 tablet Oral BID Frank Ricardo MD   1 tablet at 02/05/19 0837    sodium chloride 0.9% flush 5 mL  5 mL Intravenous PRN Frank Ricardo MD        tacrolimus capsule 0.5 mg  0.5 mg Oral BID Frank Ricardo MD   0.5 mg at 02/05/19 0838    tacrolimus capsule 1 mg  1 mg Oral BID Frank Ricardo MD   1 mg at 02/05/19 0837       Review of Systems   Constitutional: Negative.    HENT: Negative.    Eyes: Negative.    Respiratory: Negative.    Cardiovascular: Negative.    Gastrointestinal: Negative.    Endocrine: Negative.    Genitourinary: Negative.    Musculoskeletal: Negative.    Skin: Negative.    Allergic/Immunologic: Negative.    Neurological: Negative.     Hematological: Negative.    Psychiatric/Behavioral: Negative.      Objective:     Vital Signs (Most Recent):  Temp: 97.7 °F (36.5 °C) (02/05/19 0754)  Pulse: 77 (02/05/19 0754)  Resp: 16 (02/05/19 0754)  BP: 133/68 (02/05/19 0754)  SpO2: 95 % (02/05/19 0754) Vital Signs (24h Range):  Temp:  [97.4 °F (36.3 °C)-97.7 °F (36.5 °C)] 97.7 °F (36.5 °C)  Pulse:  [77-89] 77  Resp:  [16-18] 16  SpO2:  [94 %-95 %] 95 %  BP: (115-154)/(53-79) 133/68     Weight: 99.3 kg (219 lb)  Body mass index is 34.3 kg/m².    Physical Exam   Constitutional: She is oriented to person, place, and time. She appears well-developed and well-nourished.   HENT:   Head: Atraumatic.   Eyes: EOM are normal.   Cardiovascular: Normal rate.   Pulmonary/Chest: Effort normal.   Abdominal: Soft.   Musculoskeletal: Normal range of motion.   Neurological: She is alert and oriented to person, place, and time. She has a normal Finger-Nose-Finger Test.   Reflex Scores:       Tricep reflexes are 2+ on the right side and 2+ on the left side.       Bicep reflexes are 2+ on the right side and 2+ on the left side.       Brachioradialis reflexes are 2+ on the right side and 2+ on the left side.       Patellar reflexes are 2+ on the right side and 2+ on the left side.       Achilles reflexes are 2+ on the right side and 2+ on the left side.  Skin: Skin is warm and dry.   Psychiatric: She has a normal mood and affect.       NEUROLOGICAL EXAMINATION:     MENTAL STATUS   Oriented to person, place, and time.   Level of consciousness: alert    CRANIAL NERVES   Cranial nerves II through XII intact.     CN III, IV, VI   Extraocular motions are normal.     MOTOR EXAM     Strength   Right neck flexion: 5/5  Left neck flexion: 5/5  Right neck extension: 5/5  Left neck extension: 5/5  Right deltoid: 5/5  Left deltoid: 5/5  Right biceps: 5/5  Left biceps: 5/5  Right triceps: 5/5  Left triceps: 5/5  Right wrist flexion: 5/5  Left wrist flexion: 5/5  Right wrist extension:  5/5  Left wrist extension: 5/5  Right interossei: 5/5  Left interossei: 5/5  Right abdominals: 5/5  Left abdominals: 5/5  Right iliopsoas: 5/5  Left iliopsoas: 5/5  Right quadriceps: 5/5  Left quadriceps: 5/5  Right hamstrin/5  Left hamstrin/5  Right glutei: 5/5  Left glutei: 5/5  Right anterior tibial: 5/5  Left anterior tibial: 5/5  Right posterior tibial: 5/5  Left posterior tibial: 5/5  Right peroneal: 5/5  Left peroneal: 5/5  Right gastroc: 5/5  Left gastroc: 5/5    REFLEXES     Reflexes   Right brachioradialis: 2+  Left brachioradialis: 2+  Right biceps: 2+  Left biceps: 2+  Right triceps: 2+  Left triceps: 2+  Right patellar: 2+  Left patellar: 2+  Right achilles: 2+  Left achilles: 2+  Right : 2+  Left : 2+    SENSORY EXAM   Light touch normal.     GAIT AND COORDINATION      Coordination   Finger to nose coordination: normal    Tremor   Resting tremor: absent  Intention tremor: absent      Significant Labs: reviewed     Significant Imaging: reviewed     Assessment and Plan:     Active Diagnoses:    Diagnosis Date Noted POA    PRINCIPAL PROBLEM:  Disequilibrium [R42] 2019 Yes    Status post living-donor kidney transplantation [Z94.0] 01/15/2019 Not Applicable    BMI 36.0-36.9,adult [Z68.36] 2018 Not Applicable    Anemia in chronic kidney disease, on chronic dialysis [N18.6, D63.1, Z99.2] 2017 Not Applicable    HLD (hyperlipidemia) [E78.5] 2017 Yes     Chronic    Hypothyroidism [E03.9] 2017 Yes    Depression [F32.9] 2015 Yes    Uncontrolled type 2 diabetes mellitus with stage 5 chronic kidney disease not on chronic dialysis, with long-term current use of insulin [E11.22, E11.65, N18.5, Z79.4] 2013 Not Applicable      Problems Resolved During this Admission:       VTE Risk Mitigation (From admission, onward)        Ordered     enoxaparin injection 40 mg  Daily      19     IP VTE HIGH RISK PATIENT  Once      19         Disequilibrium - resolved   Status post kidney transplant   Diabetes       CT brain negative acute  CUS shows no stenosis   F/U echo  Pt refusing MRI brain due to claustrophobia   -Explained benefits of MRI to complete stroke workup and pt v/u  No further workup from neurological perspective at this time  Pt feels back to her baseline  Stable from neurological standpoint       Stroke education was provided.  If patient has acute neurological changes including weakness, confusion, speech changes, facial droop, difficulty walking, and sensory changes immediately call 911.  Follow up Neurology in 2 weeks at [phone].  Medication side effects discussed with the patient and/or caregiver.        GHULAM Mclaughlin  [facility]    I, Dr. Freedom August, discussed care with my advanced practitioner and agree with above. I have reviewed patient clinical presentation, work up, impression and plan.

## 2019-02-05 NOTE — PLAN OF CARE
02/05/19 1703   Discharge Assessment   Assessment Type Discharge Planning Reassessment   Call placed to referral center [phone], still no bed available. CM notified patient of status.

## 2019-02-06 ENCOUNTER — HOSPITAL ENCOUNTER (INPATIENT)
Facility: HOSPITAL | Age: 59
LOS: 1 days | Discharge: HOME | DRG: 312 | End: 2019-02-07
Attending: TRANSPLANT SURGERY | Admitting: TRANSPLANT SURGERY
Payer: MEDICARE

## 2019-02-06 VITALS
SYSTOLIC BLOOD PRESSURE: 127 MMHG | OXYGEN SATURATION: 95 % | WEIGHT: 219 LBS | HEART RATE: 90 BPM | RESPIRATION RATE: 16 BRPM | HEIGHT: 67 IN | TEMPERATURE: 100 F | BODY MASS INDEX: 34.37 KG/M2 | DIASTOLIC BLOOD PRESSURE: 68 MMHG

## 2019-02-06 DIAGNOSIS — Z79.899 LONG TERM CURRENT USE OF IMMUNOSUPPRESSIVE DRUG: ICD-10-CM

## 2019-02-06 DIAGNOSIS — E11.22 TYPE 2 DIABETES MELLITUS WITH CHRONIC KIDNEY DISEASE, WITH LONG-TERM CURRENT USE OF INSULIN, UNSPECIFIED CKD STAGE: ICD-10-CM

## 2019-02-06 DIAGNOSIS — Z91.89 AT RISK FOR OPPORTUNISTIC INFECTIONS: ICD-10-CM

## 2019-02-06 DIAGNOSIS — R55 NEAR SYNCOPE: ICD-10-CM

## 2019-02-06 DIAGNOSIS — E55.9 VITAMIN D DEFICIENCY: ICD-10-CM

## 2019-02-06 DIAGNOSIS — Z29.89 PROPHYLACTIC IMMUNOTHERAPY: ICD-10-CM

## 2019-02-06 DIAGNOSIS — Z79.4 TYPE 2 DIABETES MELLITUS WITH CHRONIC KIDNEY DISEASE, WITH LONG-TERM CURRENT USE OF INSULIN, UNSPECIFIED CKD STAGE: ICD-10-CM

## 2019-02-06 DIAGNOSIS — E78.2 MIXED HYPERLIPIDEMIA: Chronic | ICD-10-CM

## 2019-02-06 DIAGNOSIS — Z94.0 STATUS POST LIVING-DONOR KIDNEY TRANSPLANTATION: ICD-10-CM

## 2019-02-06 PROBLEM — D62 ACUTE BLOOD LOSS ANEMIA: Status: RESOLVED | Noted: 2019-01-17 | Resolved: 2019-02-06

## 2019-02-06 PROBLEM — Z99.2 ANEMIA IN CHRONIC KIDNEY DISEASE, ON CHRONIC DIALYSIS: Status: RESOLVED | Noted: 2017-07-12 | Resolved: 2019-02-06

## 2019-02-06 PROBLEM — N18.6 ANEMIA IN CHRONIC KIDNEY DISEASE, ON CHRONIC DIALYSIS: Status: RESOLVED | Noted: 2017-07-12 | Resolved: 2019-02-06

## 2019-02-06 PROBLEM — D63.1 ANEMIA IN CHRONIC KIDNEY DISEASE, ON CHRONIC DIALYSIS: Status: RESOLVED | Noted: 2017-07-12 | Resolved: 2019-02-06

## 2019-02-06 PROBLEM — Z79.60 LONG TERM CURRENT USE OF IMMUNOSUPPRESSIVE DRUG: Status: ACTIVE | Noted: 2019-02-06

## 2019-02-06 PROBLEM — Z76.82 ORGAN TRANSPLANT CANDIDATE: Status: RESOLVED | Noted: 2017-07-12 | Resolved: 2019-02-06

## 2019-02-06 PROBLEM — E87.5 HYPERKALEMIA: Status: RESOLVED | Noted: 2017-04-12 | Resolved: 2019-02-06

## 2019-02-06 LAB
ALBUMIN SERPL BCP-MCNC: 3.3 G/DL
ALP SERPL-CCNC: 227 U/L
ALT SERPL W/O P-5'-P-CCNC: 30 U/L
ANION GAP SERPL CALC-SCNC: 11 MMOL/L
AST SERPL-CCNC: 20 U/L
BILIRUB SERPL-MCNC: 0.4 MG/DL
BILIRUB UR QL STRIP: NEGATIVE
BUN SERPL-MCNC: 20 MG/DL
CALCIUM SERPL-MCNC: 8.9 MG/DL
CHLORIDE SERPL-SCNC: 106 MMOL/L
CLARITY UR REFRACT.AUTO: CLEAR
CO2 SERPL-SCNC: 22 MMOL/L
COLOR UR AUTO: YELLOW
CREAT SERPL-MCNC: 1.2 MG/DL
EST. GFR  (AFRICAN AMERICAN): 57 ML/MIN/1.73 M^2
EST. GFR  (NON AFRICAN AMERICAN): 49 ML/MIN/1.73 M^2
GLUCOSE SERPL-MCNC: 197 MG/DL
GLUCOSE UR QL STRIP: NEGATIVE
HGB UR QL STRIP: ABNORMAL
KETONES UR QL STRIP: NEGATIVE
LEUKOCYTE ESTERASE UR QL STRIP: NEGATIVE
MAGNESIUM SERPL-MCNC: 1.6 MG/DL
MICROSCOPIC COMMENT: ABNORMAL
NITRITE UR QL STRIP: NEGATIVE
PH UR STRIP: 5 [PH] (ref 5–8)
POCT GLUCOSE: 190 MG/DL (ref 70–110)
POCT GLUCOSE: 207 MG/DL (ref 70–110)
POCT GLUCOSE: 216 MG/DL (ref 70–110)
POCT GLUCOSE: 220 MG/DL (ref 70–110)
POCT GLUCOSE: 265 MG/DL (ref 70–110)
POTASSIUM SERPL-SCNC: 4.5 MMOL/L
PROT SERPL-MCNC: 6.3 G/DL
PROT UR QL STRIP: NEGATIVE
RBC #/AREA URNS AUTO: 6 /HPF (ref 0–4)
SODIUM SERPL-SCNC: 139 MMOL/L
SP GR UR STRIP: 1.01 (ref 1–1.03)
SQUAMOUS #/AREA URNS AUTO: 0 /HPF
URN SPEC COLLECT METH UR: ABNORMAL
WBC #/AREA URNS AUTO: 1 /HPF (ref 0–5)

## 2019-02-06 PROCEDURE — 83735 ASSAY OF MAGNESIUM: CPT

## 2019-02-06 PROCEDURE — 99223 PR INITIAL HOSPITAL CARE,LEVL III: ICD-10-PCS | Mod: 24,,, | Performed by: NURSE PRACTITIONER

## 2019-02-06 PROCEDURE — 99232 PR SUBSEQUENT HOSPITAL CARE,LEVL II: ICD-10-PCS | Mod: GC,,, | Performed by: INTERNAL MEDICINE

## 2019-02-06 PROCEDURE — 25000003 PHARM REV CODE 250: Performed by: NURSE PRACTITIONER

## 2019-02-06 PROCEDURE — 87040 BLOOD CULTURE FOR BACTERIA: CPT

## 2019-02-06 PROCEDURE — 36415 COLL VENOUS BLD VENIPUNCTURE: CPT

## 2019-02-06 PROCEDURE — 81001 URINALYSIS AUTO W/SCOPE: CPT

## 2019-02-06 PROCEDURE — 25000003 PHARM REV CODE 250: Performed by: INTERNAL MEDICINE

## 2019-02-06 PROCEDURE — 80053 COMPREHEN METABOLIC PANEL: CPT

## 2019-02-06 PROCEDURE — S5571 INSULIN DISPOS PEN 3 ML: HCPCS | Performed by: INTERNAL MEDICINE

## 2019-02-06 PROCEDURE — 63600175 PHARM REV CODE 636 W HCPCS: Performed by: INTERNAL MEDICINE

## 2019-02-06 PROCEDURE — 20600001 HC STEP DOWN PRIVATE ROOM

## 2019-02-06 PROCEDURE — 99232 SBSQ HOSP IP/OBS MODERATE 35: CPT | Mod: GC,,, | Performed by: INTERNAL MEDICINE

## 2019-02-06 PROCEDURE — 63600175 PHARM REV CODE 636 W HCPCS: Performed by: NURSE PRACTITIONER

## 2019-02-06 PROCEDURE — 99223 1ST HOSP IP/OBS HIGH 75: CPT | Mod: 24,,, | Performed by: NURSE PRACTITIONER

## 2019-02-06 RX ORDER — FAMOTIDINE 20 MG/1
20 TABLET, FILM COATED ORAL 2 TIMES DAILY
Status: DISCONTINUED | OUTPATIENT
Start: 2019-02-06 | End: 2019-02-07 | Stop reason: HOSPADM

## 2019-02-06 RX ORDER — VILAZODONE HYDROCHLORIDE 10 MG/1
40 TABLET ORAL DAILY
Status: DISCONTINUED | OUTPATIENT
Start: 2019-02-06 | End: 2019-02-06

## 2019-02-06 RX ORDER — SODIUM CHLORIDE 0.9 % (FLUSH) 0.9 %
3 SYRINGE (ML) INJECTION
Status: DISCONTINUED | OUTPATIENT
Start: 2019-02-06 | End: 2019-02-07 | Stop reason: HOSPADM

## 2019-02-06 RX ORDER — MYCOPHENOLATE MOFETIL 250 MG/1
500 CAPSULE ORAL 2 TIMES DAILY
Status: DISCONTINUED | OUTPATIENT
Start: 2019-02-06 | End: 2019-02-07

## 2019-02-06 RX ORDER — IBUPROFEN 200 MG
16 TABLET ORAL
Status: DISCONTINUED | OUTPATIENT
Start: 2019-02-06 | End: 2019-02-07 | Stop reason: HOSPADM

## 2019-02-06 RX ORDER — DIAZEPAM 5 MG/1
5 TABLET ORAL
Status: DISCONTINUED | OUTPATIENT
Start: 2019-02-06 | End: 2019-02-07

## 2019-02-06 RX ORDER — LANOLIN ALCOHOL/MO/W.PET/CERES
800 CREAM (GRAM) TOPICAL DAILY
Status: DISCONTINUED | OUTPATIENT
Start: 2019-02-06 | End: 2019-02-07 | Stop reason: HOSPADM

## 2019-02-06 RX ORDER — LORAZEPAM 1 MG/1
1 TABLET ORAL 2 TIMES DAILY
Status: DISCONTINUED | OUTPATIENT
Start: 2019-02-06 | End: 2019-02-07 | Stop reason: HOSPADM

## 2019-02-06 RX ORDER — ERGOCALCIFEROL 1.25 MG/1
50000 CAPSULE ORAL
Status: DISCONTINUED | OUTPATIENT
Start: 2019-02-12 | End: 2019-02-07 | Stop reason: HOSPADM

## 2019-02-06 RX ORDER — INSULIN ASPART 100 [IU]/ML
0-5 INJECTION, SOLUTION INTRAVENOUS; SUBCUTANEOUS
Status: DISCONTINUED | OUTPATIENT
Start: 2019-02-06 | End: 2019-02-07 | Stop reason: HOSPADM

## 2019-02-06 RX ORDER — GLUCAGON 1 MG
1 KIT INJECTION
Status: DISCONTINUED | OUTPATIENT
Start: 2019-02-06 | End: 2019-02-06

## 2019-02-06 RX ORDER — DAPSONE 100 MG/1
100 TABLET ORAL DAILY
Status: DISCONTINUED | OUTPATIENT
Start: 2019-02-06 | End: 2019-02-07

## 2019-02-06 RX ORDER — ACYCLOVIR 200 MG/1
400 CAPSULE ORAL 2 TIMES DAILY
Status: DISCONTINUED | OUTPATIENT
Start: 2019-02-06 | End: 2019-02-06

## 2019-02-06 RX ORDER — INSULIN ASPART 100 [IU]/ML
0-5 INJECTION, SOLUTION INTRAVENOUS; SUBCUTANEOUS
Status: DISCONTINUED | OUTPATIENT
Start: 2019-02-06 | End: 2019-02-06

## 2019-02-06 RX ORDER — IBUPROFEN 200 MG
24 TABLET ORAL
Status: DISCONTINUED | OUTPATIENT
Start: 2019-02-06 | End: 2019-02-07 | Stop reason: HOSPADM

## 2019-02-06 RX ORDER — IBUPROFEN 200 MG
24 TABLET ORAL
Status: DISCONTINUED | OUTPATIENT
Start: 2019-02-06 | End: 2019-02-06

## 2019-02-06 RX ORDER — GLUCAGON 1 MG
1 KIT INJECTION
Status: DISCONTINUED | OUTPATIENT
Start: 2019-02-06 | End: 2019-02-07 | Stop reason: HOSPADM

## 2019-02-06 RX ORDER — VILAZODONE HYDROCHLORIDE 10 MG/1
40 TABLET ORAL DAILY
Status: DISCONTINUED | OUTPATIENT
Start: 2019-02-07 | End: 2019-02-07 | Stop reason: HOSPADM

## 2019-02-06 RX ORDER — IBUPROFEN 200 MG
16 TABLET ORAL
Status: DISCONTINUED | OUTPATIENT
Start: 2019-02-06 | End: 2019-02-06

## 2019-02-06 RX ORDER — ATORVASTATIN CALCIUM 20 MG/1
40 TABLET, FILM COATED ORAL DAILY
Status: DISCONTINUED | OUTPATIENT
Start: 2019-02-07 | End: 2019-02-07 | Stop reason: HOSPADM

## 2019-02-06 RX ORDER — ATORVASTATIN CALCIUM 20 MG/1
40 TABLET, FILM COATED ORAL DAILY
Status: DISCONTINUED | OUTPATIENT
Start: 2019-02-06 | End: 2019-02-06

## 2019-02-06 RX ORDER — ONDANSETRON 8 MG/1
8 TABLET, ORALLY DISINTEGRATING ORAL EVERY 8 HOURS PRN
Status: DISCONTINUED | OUTPATIENT
Start: 2019-02-06 | End: 2019-02-07 | Stop reason: HOSPADM

## 2019-02-06 RX ORDER — ACETAMINOPHEN 325 MG/1
650 TABLET ORAL EVERY 8 HOURS PRN
Status: DISCONTINUED | OUTPATIENT
Start: 2019-02-06 | End: 2019-02-07 | Stop reason: HOSPADM

## 2019-02-06 RX ADMIN — INSULIN ASPART 2 UNITS: 100 INJECTION, SOLUTION INTRAVENOUS; SUBCUTANEOUS at 05:02

## 2019-02-06 RX ADMIN — LEVOTHYROXINE SODIUM 75 MCG: 50 TABLET ORAL at 05:02

## 2019-02-06 RX ADMIN — MYCOPHENOLATE MOFETIL 500 MG: 250 CAPSULE ORAL at 08:02

## 2019-02-06 RX ADMIN — TACROLIMUS 1 MG: 1 CAPSULE ORAL at 08:02

## 2019-02-06 RX ADMIN — INSULIN DETEMIR 24 UNITS: 100 INJECTION, SOLUTION SUBCUTANEOUS at 08:02

## 2019-02-06 RX ADMIN — TACROLIMUS 0.5 MG: 0.5 CAPSULE ORAL at 08:02

## 2019-02-06 RX ADMIN — DAPSONE 100 MG: 100 TABLET ORAL at 03:02

## 2019-02-06 RX ADMIN — LORAZEPAM 1 MG: 1 TABLET ORAL at 08:02

## 2019-02-06 RX ADMIN — AMOXICILLIN AND CLAVULANATE POTASSIUM 1 TABLET: 875; 125 TABLET, FILM COATED ORAL at 08:02

## 2019-02-06 RX ADMIN — ATORVASTATIN CALCIUM 40 MG: 40 TABLET, FILM COATED ORAL at 08:02

## 2019-02-06 RX ADMIN — FAMOTIDINE 20 MG: 20 TABLET, FILM COATED ORAL at 08:02

## 2019-02-06 RX ADMIN — MAGNESIUM OXIDE TAB 400 MG (241.3 MG ELEMENTAL MG) 800 MG: 400 (241.3 MG) TAB at 02:02

## 2019-02-06 RX ADMIN — TACROLIMUS 1.5 MG: 1 CAPSULE ORAL at 05:02

## 2019-02-06 RX ADMIN — FAMOTIDINE 20 MG: 20 TABLET ORAL at 08:02

## 2019-02-06 NOTE — HPI
Reason for Consult: Management of T2DM, Hyperglycemia     Surgical Procedure and Date:   1/2019 - KTx    Diabetes diagnosis year:   2005    Home Diabetes Medications:    Basaglar 23u qHS  Januvia 100mg daily    How often checking glucose at home? 1-3 x day   BG readings on regimen: 170-240s  Hypoglycemia on the regimen?  No  Missed doses on regimen?  No    Diabetes Complications include:     Hyperglycemia    Complicating diabetes co morbidities:   KTx      HPI:   Patient is a 58 y.o. female with a diagnosis of KTx (1/2019) and T2DM (on above regimen) transferred from other [facility] facility for concern for cerebellar stroke. Endocrinology has been consulted for DM management.    Patient relatively controlled in outpatient setting.  Does have elevated fasting BG levels upon review of BG log.  Had appointment with [facility] endocrinology, but missed appointment due to admission.

## 2019-02-06 NOTE — PLAN OF CARE
Problem: Adult Inpatient Plan of Care  Goal: Plan of Care Review  Outcome: Revised  Pt is AAOx.4.  Cardiac monitoring in place, NSR.  IV saline locked, no redness or swelling at site.  Incision to RLQ of abd approximated, staples intact.  BG monitored, SSI given prn as ordered.  VSS, in NAD, pt remains afebrile.  Pt remains free from injury.  Bed in low position, wheels locked, call light within reach.  Pt verbalized understanding of POC.  Will continue to monitor.

## 2019-02-06 NOTE — SUBJECTIVE & OBJECTIVE
"  Subjective:     Chief Complaint/Reason for Admission: altered gait    History of Present Illness:  59 yo female with pmh of ESRD secondary DM Type II and HTN.  Now s/p living kidney transplant on 1/14/19.  Admitted to [facility] for concern of cerebellar stroke. Reports being in normal state of health until 2300 2/3/19 when she woke up and felt especially dizzy while walking to the bathroom. She didn't think much of it at the time and was able to make it to/from the bathroom without incident. However, this AM on rising she reports her symptoms continued.  Given persistence pt was presented to the ED for evaluation.  Now transferred to [facility] for further evaluation with MRI brain.        PTA Medications   Medication Sig    atorvastatin (LIPITOR) 40 MG tablet TAKE ONE TABLET BY MOUTH ONCE DAILY    BD ULTRA-FINE MINI PEN NEEDLE 31 gauge x 3/16" Ndle USE ONE NEEDLE ONCE DAILY    ergocalciferol (ERGOCALCIFEROL) 50,000 unit Cap Take 1 capsule (50,000 Units total) by mouth every 7 days. Take on Tues    famotidine (PEPCID) 20 MG tablet Take 1 tablet (20 mg total) by mouth 2 (two) times daily.    insulin (BASAGLAR KWIKPEN U-100 INSULIN) glargine 100 units/mL (3mL) SubQ pen Inject 16 Units into the skin every evening. (Patient taking differently: Inject 20 Units into the skin every evening. )    INV acyclovir/placebo capsule Take 1 capsule (400 mg total) by mouth every 12 (twelve) hours.  For investigational use only. Protocol Mk 8228-002    INV MK-8228/placebo 480 mg oral tablet Take 1 tablet (480 mg total) by mouth once daily. FOR INVESTIGATIONAL USE ONLY. Patient Study# [id number]. Protocol: MK 8228-002    INV valganciclovir/placebo tablet Take 2 tablets (900 mg total) by mouth once daily.  FOR INVESTIGATIONAL USE ONLY. Protocol: MK-8228-002    levothyroxine (SYNTHROID) 75 MCG tablet TAKE ONE TABLET BY MOUTH ONCE DAILY    LORazepam (ATIVAN) 2 MG Tab Take 1 tablet (2 mg total) by mouth 2 (two) times daily. " (Patient taking differently: Take 1 mg by mouth 2 (two) times daily. )    magnesium oxide (MAG-OX) 400 mg (241.3 mg magnesium) tablet Take 2 tablets (800 mg total) by mouth once daily.    multivitamin (THERAGRAN) tablet Take 1 tablet by mouth once daily.    mycophenolate (CELLCEPT) 250 mg Cap Take 2 capsules (500 mg total) by mouth 2 (two) times daily.    SITagliptin (JANUVIA) 100 MG Tab Take 1 tablet (100 mg total) by mouth once daily.    tacrolimus (PROGRAF) 0.5 MG Cap Take 3 capsules (1.5 mg total) by mouth every morning AND 3 capsules (1.5 mg total) every evening. Z94.0 kidney transplant.    VIIBRYD 40 mg Tab tablet TAKE ONE TABLET BY MOUTH ONCE DAILY       Review of patient's allergies indicates:   Allergen Reactions    Torsemide Diarrhea     Diarrhea stopped once discontinued med    Codeine Itching     Can take oxycodone and hydrocodone with Benadryl       Past Medical History:   Diagnosis Date    Anemia     Anemia in chronic kidney disease, on chronic dialysis 7/12/2017    Arthritis     Asthma     allergic airway    Colon polyp     Depression     Diabetes mellitus     Diverticulosis     Eosinophilia     ESRD (end stage renal disease)     stage V, due for living donor 9/2018    ESRD on dialysis     GERD (gastroesophageal reflux disease)     Gout     Gout     Hyperlipidemia     Hypertension     Low back pain     MRSA carrier     Obesity     Renal disorder     Rotator cuff syndrome--left     Thyroid disease     Ulnar neuropathy of right upper extremity     Uncontrolled type 2 diabetes mellitus with hyperglycemia      Past Surgical History:   Procedure Laterality Date    ACHILLES TENDON SURGERY Left May 2015    ARTHROSCOPY, HIP Left 10/3/2013    [facility]    ARTHROSCOPY-HIP WITH TROCHANTERIC BURSECTOMY Left 10/3/2013    [facility]    ARTHROSCOPY-SHOULDER Left 11/24/2015    [facility]  OR    ARTHROSCOPY-SHOULDER WITH SUBACROMIAL DECOMPRESSION Left 7/12/2016    [facility] OR    BACK SURGERY      CHOLECYSTECTOMY      COLONOSCOPY N/A 9/6/2017    [facility]    DEBRIDEMENT-SHOULDER-ARTHROSCOPIC Left 7/12/2016    [facility] at [facility] OR    DEBRIDEMENT-SHOULDER-ARTHROSCOPIC; LABRAL Left 11/24/2015    [facility] at [facility] OR    DIALYSIS FISTULA CREATION  12/2017    FEMOROPLASTY, ARTHROSCOPIC Left 10/3/2013    [facility] at [facility] OR    HEMORRHOID SURGERY      INJECTION-AMNIOX Left 7/12/2016    [facility] at [facility] OR    JOINT REPLACEMENT      left    KNEE SURGERY      LYSIS-ADHESION Left 11/24/2015    [facility] at [facility] OR    REPAIR ROTATOR CUFF ARTHROSCOPIC Left 7/12/2016    [facility] at [facility] OR    REPAIR-TENDON-BICEP Left 11/24/2015    [facility] at [facility] OR    SHOULDER ARTHROSCOPY      SHOULDER SURGERY      TRANSPLANT, KIDNEY N/A 1/14/2019    [facility]    UPPER GASTROINTESTINAL ENDOSCOPY  ~2013     Kirt     Family History     Problem Relation (Age of Onset)    Alzheimer's disease Mother    COPD Maternal Aunt    Diabetes Mother, Sister, Maternal Grandmother, Maternal Aunt    Heart disease Father, Brother, Maternal Aunt    Hyperlipidemia Mother    Hypertension Maternal Grandmother, Maternal Aunt    Lupus Sister    No Known Problems Son, Paternal Grandmother, Paternal Grandfather, Maternal Uncle, Paternal Aunt, Paternal Uncle    Ovarian cancer Sister    Stroke Father    Thyroid disease Mother        Tobacco Use    Smoking status: Never Smoker    Smokeless tobacco: Never Used   Substance and Sexual Activity    Alcohol use: No    Drug use: No    Sexual activity: No        Review of Systems   Constitutional: Negative for activity change  "and appetite change.   Respiratory: Negative for shortness of breath.    Cardiovascular: Negative for leg swelling.   Gastrointestinal: Negative for abdominal pain.   Musculoskeletal: Positive for gait problem.   Allergic/Immunologic: Positive for immunocompromised state.   Neurological: Positive for dizziness.   Psychiatric/Behavioral: Positive for sleep disturbance. The patient is nervous/anxious.      Objective:     Vital Signs (Most Recent):  Temp: 98.3 °F (36.8 °C) (02/06/19 1200)  Pulse: 87 (02/06/19 1200)  Resp: 18 (02/06/19 1200)  BP: (!) 152/66 (02/06/19 1200)  SpO2: (!) 94 % (02/06/19 1200)  Height: 5' 7" (170.2 cm)  Weight: 99.1 kg (218 lb 7.6 oz)  Body mass index is 34.22 kg/m².     Physical Exam   Constitutional: She is oriented to person, place, and time.   HENT:   Head: Normocephalic and atraumatic.   Eyes: Pupils are equal, round, and reactive to light.   Neck: Normal range of motion.   Cardiovascular: Normal rate, regular rhythm, normal heart sounds and intact distal pulses.   Pulmonary/Chest: Effort normal and breath sounds normal.   Abdominal: Soft. Bowel sounds are normal.       Musculoskeletal: Normal range of motion. She exhibits no edema.   Neurological: She is alert and oriented to person, place, and time.   Skin: Skin is warm and dry.   Psychiatric: She has a normal mood and affect. Her behavior is normal. Judgment and thought content normal.   Nursing note and vitals reviewed.      Laboratory  CBC:   Recent Labs   Lab 02/01/19  0826 02/03/19  1114   WBC 3.87* 3.20*   RBC 3.16* 3.00*   HGB 9.2* 8.9*   HCT 29.9* 27.2*    224   MCV 95 91   MCH 29.1 29.6   MCHC 30.8* 32.6     BMP:   Recent Labs   Lab 02/04/19  0505 02/05/19  0541 02/06/19  0452   * 216* 197*    140 139   K 4.3 4.7 4.5    108 106   CO2 23 23 22*   BUN 19 20 20   CREATININE 1.2 1.2 1.2   CALCIUM 9.1 9.2 8.9       Diagnostic Results:  None  "

## 2019-02-06 NOTE — PROGRESS NOTES
Admit Note     Met with patient and significant other Any to assess needs. Patient is a 58 y.o. is in committed relationship with Any callejas, admitted for post kidney transplantation on 1/14/19 .    Patient admitted from home on 2/6/2019 .  At this time, patient presents as alert and oriented x 4, pleasant, good eye contact, well groomed, recall good, concentration/judgement good, average intelligence, calm, communicative, cooperative and asking and answering questions appropriately.  At this time, patients caregiver presents as alert and oriented x 4, pleasant, good eye contact, well groomed, recall good, concentration/judgement good, average intelligence, calm, communicative, cooperative and asking and answering questions appropriately.    Household/Family Systems     Patient resides with patient's significant other, Any at [address] [place] [address].  Support system includes s/o, two sons and friend Ashleigh.  Patient does not have dependents that are need of being cared for.     Patients primary caregiver is Any , patients significant other, phone number [phone].  Confirmed patients contact information is [phone] (home);   Telephone Information:   Mobile [phone]   .    During admission, patient's caregiver plans to stay in patient's room.  Confirmed patient and patients caregivers do have access to reliable transportation.    Cognitive Status/Learning     Patient reports reading ability as 12th grade and states patient does have difficulty with seeing and utilizes glasses .  Patient reports patient learns best by hands-on instruction .   Needed: No.   Highest education level: High School (9-12) or GED    Vocation/Disability   .  Working for Income: No  If no, reason not working: Disability  Patient is disabled due to work injury  since 2015.  Prior to disability, patient  was employed as a Rec Supervisor at [employer] .    Adherence     Patient reports  a high level of adherence to patients health care regimen.  Adherence counseling and education provided. Patient verbalizes understanding.    Substance Use    Patient reports the following substance usage.    Tobacco: none, patient denies any use.  Alcohol: none, patient denies any use.  Illicit Drugs/Non-prescribed Medications: none, patient denies any use.  Patient states clear understanding of the potential impact of substance use.  Substance abstinence/cessation counseling, education and resources provided and reviewed.     Services Utilizing/ADLS    Infusion Service: Prior to admission, patient utilizing? no  Home Health: Prior to admission, patient utilizing? no  DME: Prior to admission, yes scooter for long distances  Pulmonary/Cardiac Rehab: Prior to admission, no  Dialysis:  Prior to admission, yes MWF  Transplant Specialty Pharmacy:  Prior to admission, yes; [facility] .    Prior to admission, patient reports patient was independent with ADLS and was driving.  Patient reports patient is not able to care for self at this time due to compromised medical condition (as documented in medical record) and physical weakness..  Patient indicates a willingness to care for self once medically cleared to do so.    Insurance/Medications    Insured by   Payor/Plan Subscr  Sex Relation Sub. Ins. ID Effective Group Num   1. [employer] * KVNG NUNEZ 1902 Female  [id number]                                    [address] [facility] [address]   2. [place]* VIRGIE DUKE * 1960 Female  [id number]                                    [address]      Primary Insurance (for UNOS reporting): Public Insurance - Medicare FFS (Fee For Service)  Secondary Insurance (for UNOS reporting): Private Insurance    Patient reports patient is able to obtain and afford medications at this time and at time of discharge.    Living Will/Healthcare Power of     Patient states  "patient does not have a LW and/or HCPA.   provided education regarding LW and HCPA and the completion of forms.    Coping/Mental Health    Patient is coping adequately with the aid of  family members and friends.  Patient denies mental health difficulties at this present time. Pt does have a history of SI after work injury in 2015 and was hospitalized. Pt is currently followed by Dr. Sheth for depression. Pt utilizes Ativan and Vilbryd daily and reports as stable. However, pt reports to this writer today that she feels it is not working. Pt reports she feels her focus is shifting from being obsessive about previous issues to now being obsessive about getting sick. Pt reports now that she is immuno suppressed she finds herself panicking about "the what if's". SW assessed pt's mood and possible SI/HI/ AVH. Pt denies all and SW feels confident that pt is stressed and in no way a danger to herself. Pt reports she is stressed and can acknowledge that her thoughts are due to anxiety. SW provided reflective listening and explored different avenues of coping. SW stressed the importance of communicating these thoughts with her primary psychiatrist because she may need a medication adjustment. Pt reports she cried at the thought of needing an MRI because she does not like closed spaces and she start to panic. SW validated pt's feelings and reports that there may be other options she can discuss with the medical provider. Pt and spouse deny any additional needs or concerns.      Discharge Planning    At time of discharge, patient plans to return to patient's home (if available) under the care of Any.  Patients significant other will transport patient.  Per rounds today, expected discharge date has not been medically determined at this time. Patient and patients caregiver  verbalize understanding and are involved in treatment planning and discharge process.    Additional Concerns    Patient's caretaker " denies additional needs and/or concerns at this time.  providing ongoing psychosocial support, education, resources and d/c planning as needed.  SW remains available. Patient's caregiver verbalizes understanding and agreement with information reviewed,  availability and how to access available resources as needed. Patient denies additional needs and/or concerns at this time. Patient verbalizes understanding and agreement with information reviewed, social work availability, and how to access available resources as needed.

## 2019-02-06 NOTE — H&P
"[facility]  Kidney Transplant  H&P      Subjective:     Chief Complaint/Reason for Admission: altered gait    History of Present Illness:  59 yo female with pmh of ESRD secondary DM Type II and HTN.  Now s/p living kidney transplant on 1/14/19.  Admitted to [facility] for concern of cerebellar stroke. Reports being in normal state of health until 2300 2/3/19 when she woke up and felt especially dizzy while walking to the bathroom. She didn't think much of it at the time and was able to make it to/from the bathroom without incident. However, this AM on rising she reports her symptoms continued.  Given persistence pt was presented to the ED for evaluation.  Now transferred to [facility] for further evaluation with MRI brain.        PTA Medications   Medication Sig    atorvastatin (LIPITOR) 40 MG tablet TAKE ONE TABLET BY MOUTH ONCE DAILY    BD ULTRA-FINE MINI PEN NEEDLE 31 gauge x 3/16" Ndle USE ONE NEEDLE ONCE DAILY    ergocalciferol (ERGOCALCIFEROL) 50,000 unit Cap Take 1 capsule (50,000 Units total) by mouth every 7 days. Take on Tues    famotidine (PEPCID) 20 MG tablet Take 1 tablet (20 mg total) by mouth 2 (two) times daily.    insulin (BASAGLAR KWIKPEN U-100 INSULIN) glargine 100 units/mL (3mL) SubQ pen Inject 16 Units into the skin every evening. (Patient taking differently: Inject 20 Units into the skin every evening. )    INV acyclovir/placebo capsule Take 1 capsule (400 mg total) by mouth every 12 (twelve) hours.  For investigational use only. Protocol Mk 8228-002    INV MK-8228/placebo 480 mg oral tablet Take 1 tablet (480 mg total) by mouth once daily. FOR INVESTIGATIONAL USE ONLY. Patient Study# [id number]. Protocol: MK 8228-002    INV valganciclovir/placebo tablet Take 2 tablets (900 mg total) by mouth once daily.  FOR INVESTIGATIONAL USE ONLY. Protocol: MK-8228-002    levothyroxine (SYNTHROID) 75 MCG tablet TAKE ONE TABLET BY MOUTH ONCE DAILY    LORazepam (ATIVAN) 2 MG " Tab Take 1 tablet (2 mg total) by mouth 2 (two) times daily. (Patient taking differently: Take 1 mg by mouth 2 (two) times daily. )    magnesium oxide (MAG-OX) 400 mg (241.3 mg magnesium) tablet Take 2 tablets (800 mg total) by mouth once daily.    multivitamin (THERAGRAN) tablet Take 1 tablet by mouth once daily.    mycophenolate (CELLCEPT) 250 mg Cap Take 2 capsules (500 mg total) by mouth 2 (two) times daily.    SITagliptin (JANUVIA) 100 MG Tab Take 1 tablet (100 mg total) by mouth once daily.    tacrolimus (PROGRAF) 0.5 MG Cap Take 3 capsules (1.5 mg total) by mouth every morning AND 3 capsules (1.5 mg total) every evening. Z94.0 kidney transplant.    VIIBRYD 40 mg Tab tablet TAKE ONE TABLET BY MOUTH ONCE DAILY       Review of patient's allergies indicates:   Allergen Reactions    Torsemide Diarrhea     Diarrhea stopped once discontinued med    Codeine Itching     Can take oxycodone and hydrocodone with Benadryl       Past Medical History:   Diagnosis Date    Anemia     Anemia in chronic kidney disease, on chronic dialysis 7/12/2017    Arthritis     Asthma     allergic airway    Colon polyp     Depression     Diabetes mellitus     Diverticulosis     Eosinophilia     ESRD (end stage renal disease)     stage V, due for living donor 9/2018    ESRD on dialysis     GERD (gastroesophageal reflux disease)     Gout     Gout     Hyperlipidemia     Hypertension     Low back pain     MRSA carrier     Obesity     Renal disorder     Rotator cuff syndrome--left     Thyroid disease     Ulnar neuropathy of right upper extremity     Uncontrolled type 2 diabetes mellitus with hyperglycemia      Past Surgical History:   Procedure Laterality Date    ACHILLES TENDON SURGERY Left May 2015    ARTHROSCOPY, HIP Left 10/3/2013    [facility]    ARTHROSCOPY-HIP WITH TROCHANTERIC BURSECTOMY Left 10/3/2013    [facility] OR    ARTHROSCOPY-SHOULDER  Left 11/24/2015    [facility]    ARTHROSCOPY-SHOULDER WITH SUBACROMIAL DECOMPRESSION Left 7/12/2016    [facility] at [facility] OR    BACK SURGERY      CHOLECYSTECTOMY      COLONOSCOPY N/A 9/6/2017    [facility]    DEBRIDEMENT-SHOULDER-ARTHROSCOPIC Left 7/12/2016    [facility] at [facility] OR    DEBRIDEMENT-SHOULDER-ARTHROSCOPIC; LABRAL Left 11/24/2015    [facility] OR    DIALYSIS FISTULA CREATION  12/2017    FEMOROPLASTY, ARTHROSCOPIC Left 10/3/2013    [facility] at [facility] OR    HEMORRHOID SURGERY      INJECTION-AMNIOX Left 7/12/2016    [facility] at [facility] OR    JOINT REPLACEMENT      left    KNEE SURGERY      LYSIS-ADHESION Left 11/24/2015    [facility] at [facility]    REPAIR ROTATOR CUFF ARTHROSCOPIC Left 7/12/2016    [facility] at [facility] OR    REPAIR-TENDON-BICEP Left 11/24/2015    [facility] at [facility] OR    SHOULDER ARTHROSCOPY      SHOULDER SURGERY      TRANSPLANT, KIDNEY N/A 1/14/2019    [facility] OR [address] [place] [address]    UPPER GASTROINTESTINAL ENDOSCOPY  ~2013     Kirt     Family History     Problem Relation (Age of Onset)    Alzheimer's disease Mother    COPD Maternal Aunt    Diabetes Mother, Sister, Maternal Grandmother, Maternal Aunt    Heart disease Father, Brother, Maternal Aunt    Hyperlipidemia Mother    Hypertension Maternal Grandmother, Maternal Aunt    Lupus Sister    No Known Problems Son, Paternal Grandmother, Paternal Grandfather, Maternal Uncle, Paternal Aunt, Paternal Uncle    Ovarian cancer Sister    Stroke Father    Thyroid disease Mother        Tobacco Use    Smoking status: Never Smoker    Smokeless tobacco: Never Used   Substance and Sexual Activity    Alcohol use: No    Drug use: No    Sexual activity: No        Review  "of Systems   Constitutional: Negative for activity change and appetite change.   Respiratory: Negative for shortness of breath.    Cardiovascular: Negative for leg swelling.   Gastrointestinal: Negative for abdominal pain.   Musculoskeletal: Positive for gait problem.   Allergic/Immunologic: Positive for immunocompromised state.   Neurological: Positive for dizziness.   Psychiatric/Behavioral: Positive for sleep disturbance. The patient is nervous/anxious.      Objective:     Vital Signs (Most Recent):  Temp: 98.3 °F (36.8 °C) (02/06/19 1200)  Pulse: 87 (02/06/19 1200)  Resp: 18 (02/06/19 1200)  BP: (!) 152/66 (02/06/19 1200)  SpO2: (!) 94 % (02/06/19 1200)  Height: 5' 7" (170.2 cm)  Weight: 99.1 kg (218 lb 7.6 oz)  Body mass index is 34.22 kg/m².     Physical Exam   Constitutional: She is oriented to person, place, and time.   HENT:   Head: Normocephalic and atraumatic.   Eyes: Pupils are equal, round, and reactive to light.   Neck: Normal range of motion.   Cardiovascular: Normal rate, regular rhythm, normal heart sounds and intact distal pulses.   Pulmonary/Chest: Effort normal and breath sounds normal.   Abdominal: Soft. Bowel sounds are normal.       Musculoskeletal: Normal range of motion. She exhibits no edema.   Neurological: She is alert and oriented to person, place, and time.   Skin: Skin is warm and dry.   Psychiatric: She has a normal mood and affect. Her behavior is normal. Judgment and thought content normal.   Nursing note and vitals reviewed.      Laboratory  CBC:   Recent Labs   Lab 02/01/19  0826 02/03/19  1114   WBC 3.87* 3.20*   RBC 3.16* 3.00*   HGB 9.2* 8.9*   HCT 29.9* 27.2*    224   MCV 95 91   MCH 29.1 29.6   MCHC 30.8* 32.6     BMP:   Recent Labs   Lab 02/04/19  0505 02/05/19  0541 02/06/19  0452   * 216* 197*    140 139   K 4.3 4.7 4.5    108 106   CO2 23 23 22*   BUN 19 20 20   CREATININE 1.2 1.2 1.2   CALCIUM 9.1 9.2 8.9       Diagnostic " Results:  None    Assessment/Plan:     * Near syncope    - neurology consulted at [facility]  - plan for MRI brain  - symptoms have improved       Long term current use of immunosuppressive drug    - continue prograf and cellcept  - monitor prograf levels daily and adjust for therapeutic dose       Vitamin D deficiency    - vitamin d replacement q tuesday       At risk for opportunistic infections    CMV - research medications       Status post living-donor kidney transplantation    - campath induction  - creatinine stable 1.2, making adequate urine       Prophylactic immunotherapy    - see long term immunosuppression       HLD (hyperlipidemia)    - cotninue lipitor       Hypothyroidism    - continue synthroid       Depression    - continue vilazodone and lorazepam             Discharge Planning: not candidate at this time      Shante Green, FIDENCIO  Kidney Transplant  [facility]

## 2019-02-06 NOTE — PLAN OF CARE
02/06/19 0917   Final Note   Assessment Type Final Discharge Note   Anticipated Discharge Disposition Other Fac [place]  ([facility] for [place] team)

## 2019-02-06 NOTE — ASSESSMENT & PLAN NOTE
TSH   Date Value Ref Range Status   09/10/2018 0.504 0.400 - 4.000 uIU/mL Final     Free T4   Date Value Ref Range Status   04/12/2018 1.01 0.71 - 1.51 ng/dL Final     Continue home regimen of LT4 75mcg

## 2019-02-06 NOTE — NURSING
1815  Recd call pt had 7 beat v tach, I was in the room with the pt we were re positioning  For dinner. Pt asymptomatic, vss. Will continue to monitor

## 2019-02-06 NOTE — ASSESSMENT & PLAN NOTE
BG and insulin regimen reviewed.    BG goal 140-180.  Patient's BG above goal.  FBG above goal despite correction insulin.    Recommend:  Increase Levemir to 24u qHS  Start Januvia 100mg daily.  Low dose correction  POC AC/HS    Discharge:  TBD.

## 2019-02-06 NOTE — SUBJECTIVE & OBJECTIVE
Interval HPI:   Overnight events:  NAEO.  Patient transferred from outside facility.    Eatin%  Nausea: No  Hypoglycemia and intervention: No  Fever: No  TPN and/or TF: No    PMH, PSH, FH, SH updated and reviewed     ROS:  Review of Systems   Constitutional: Negative for unexpected weight change.   Eyes: Negative for visual disturbance.   Respiratory: Negative for shortness of breath.    Cardiovascular: Negative for chest pain.   Gastrointestinal: Negative for abdominal pain.   Genitourinary: Negative for urgency.   Musculoskeletal: Negative for arthralgias.   Skin: Negative for wound.   Neurological: Negative for headaches.   Hematological: Does not bruise/bleed easily.   Psychiatric/Behavioral: Negative for sleep disturbance.       Current Medications and/or Treatments Impacting Glycemic Control  Immunotherapy:    Immunosuppressants         Stop Route Frequency     mycophenolate capsule 500 mg      -- Oral 2 times daily     tacrolimus capsule 1.5 mg      -- Oral 2 times daily        Steroids:   Hormones (From admission, onward)    None        Pressors:    Autonomic Drugs (From admission, onward)    None        Hyperglycemia/Diabetes Medications:   Antihyperglycemics (From admission, onward)    Start     Stop Route Frequency Ordered    19 0900  SITagliptin tablet 100 mg      -- Oral Daily 19 1514    19 2100  insulin detemir U-100 pen 24 Units      -- SubQ Nightly 19 1514    19 1613  insulin aspart U-100 pen 0-5 Units      -- SubQ Before meals & nightly PRN 19 1514             PHYSICAL EXAMINATION:  Vitals:    19 1400   BP: 122/67   Pulse: 83   Resp: 15   Temp:      Body mass index is 34.22 kg/m².    Physical Exam   Constitutional: She appears well-developed.   HENT:   Right Ear: External ear normal.   Left Ear: External ear normal.   Nose: Nose normal.   Hearing normal  Dentition good   Neck: No tracheal deviation present. No thyromegaly present.   Cardiovascular:  Normal rate.   No murmur heard.  Pulmonary/Chest: Effort normal and breath sounds normal.   Abdominal: Soft. There is no tenderness. No hernia.   Musculoskeletal: She exhibits no edema or tenderness.   Neurological: She has normal reflexes. No cranial nerve deficit.   Skin: No rash noted.   No nodules   Psychiatric: She has a normal mood and affect. Judgment normal.   Vitals reviewed.

## 2019-02-06 NOTE — NURSING
Dawn from transfer center called.  No beds available at this time.  Updated Dawn on pts condition.  Pt is still number 4 on list and no pt was transferred off floor at this time.  Will continue to monitor.

## 2019-02-06 NOTE — DISCHARGE SUMMARY
Discharge Summary  Hospital Medicine    Admit Date: 2/3/2019    Date and Time: 2/6/20193:47 PM    Discharge Attending Physician: Patrick Jordan MD    Primary Care Physician: Gita Cohen MD    Diagnoses:  Active Hospital Problems    Diagnosis  POA    *Disequilibrium [R42]  Yes    Status post living-donor kidney transplantation [Z94.0]  Not Applicable    BMI 36.0-36.9,adult [Z68.36]  Not Applicable    HLD (hyperlipidemia) [E78.5]  Yes     Chronic    Hypothyroidism [E03.9]  Yes    Depression [F32.9]  Yes    Uncontrolled type 2 diabetes mellitus with stage 5 chronic kidney disease not on chronic dialysis, with long-term current use of insulin [E11.22, E11.65, N18.5, Z79.4]  Not Applicable      Resolved Hospital Problems    Diagnosis Date Resolved POA    Anemia in chronic kidney disease, on chronic dialysis [N18.6, D63.1, Z99.2] 02/06/2019 Not Applicable     Hgb stable. No overt blood loss  Monitor  Transfuse > 7/28       Discharged Condition: Good    Hospital Course:   57 yo F pmhx living kidney transplant on 1/14/19 and DM who was admitted to the hospital medicine service with concerns for cerebellar stroke. Pt reported being in her NSOH until ~ 2300 last night when she woke up and felt especially dizzy while walking to the bathroom. She didn't think much of it at the time and was able to make it to/from the bathroom without incident. However, in the morning upon rising she reported her symptoms continued.  Given persistence pt was presented to the ED for evaluation. She does note similar c/o just after her KTx.   Pt denied syncopal episode. Denied shaking/tongue biting. Denied fever/chills. Denies CP. Denied new asymmetrical weakness. Denied HA or preceding aura-like symptoms. Of note, pt has recently been treated with Dapsone for UTI beginning 1/28/2019. Later, she apparently was switched to Augmentin yesterday. Patient was admitted to Hospitalist medicine service. Patient placed on tele-monitoring and  routine neuro-checks. Neuro-imaging reviewed, results below. Patient evaluated by neurology team. Patient worked with PT/OT. Symptoms improved. Patient did not undergo MRI brain and wanted to have it done t [facility]. Patient's  arranged transfer to Transplant team at [facility].  No evidence of UTI noted during this hospitalization. Patient was transferred to [facility] in stable condition with following discharge plan of care.     Consults: Dr. Hill    Significant Diagnostic Studies:   CT head: Negative head CT.     US Carotid: No evidence of a hemodynamically significant carotid bifurcation stenosis.     ECHO:   · Left ventricular systolic function. The estimated ejection fraction is 55%  · Grade I (mild) left ventricular diastolic dysfunction consistent with impaired relaxation.  · Normal left atrial pressure.  · Normal right ventricular systolic function.  · Normal central venous pressure (3 mm Hg).  · No obvious PFO on bubble study  · Trace AR    Microbiology Results (last 7 days)     ** No results found for the last 168 hours. **        Special Treatments/Procedures: None  Disposition: [facility]    Medications:  Reconciled Home Medications:   Discharge Medication List as of 2/6/2019 10:53 AM      CONTINUE these medications which have NOT CHANGED    Details   atorvastatin (LIPITOR) 40 MG tablet TAKE ONE TABLET BY MOUTH ONCE DAILY, Normal      famotidine (PEPCID) 20 MG tablet Take 1 tablet (20 mg total) by mouth 2 (two) times daily., Starting Thu 1/17/2019, Until Fri 1/17/2020, Normal      levothyroxine (SYNTHROID) 75 MCG tablet TAKE ONE TABLET BY MOUTH ONCE DAILY, Normal      LORazepam (ATIVAN) 2 MG Tab Take 1 tablet (2 mg total) by mouth 2 (two) times daily., Starting Mon 10/15/2018, Normal      magnesium oxide (MAG-OX) 400 mg (241.3 mg magnesium) tablet Take 2 tablets (800 mg total) by mouth once daily., Starting Fri 1/25/2019, Until Sat 1/25/2020, Normal      multivitamin (THERAGRAN) tablet  "Take 1 tablet by mouth once daily., Starting Wed 1/16/2019, OTC      mycophenolate (CELLCEPT) 250 mg Cap Take 2 capsules (500 mg total) by mouth 2 (two) times daily., Starting Tue 1/15/2019, Until Wed 1/15/2020, Normal      SITagliptin (JANUVIA) 100 MG Tab Take 1 tablet (100 mg total) by mouth once daily., Starting Fri 1/18/2019, Until Sat 1/18/2020, Normal      tacrolimus (PROGRAF) 0.5 MG Cap Multiple Dosages:Starting Thu 1/31/2019, Last dose on Fri 1/31/2020Take 3 capsules (1.5 mg total) by mouth every morning AND 3 capsules (1.5 mg total) every evening. Z94.0 kidney transplant., Normal      VIIBRYD 40 mg Tab tablet TAKE ONE TABLET BY MOUTH ONCE DAILY, Normal      BD ULTRA-FINE MINI PEN NEEDLE 31 gauge x 3/16" Ndle USE ONE NEEDLE ONCE DAILY, Normal      ergocalciferol (ERGOCALCIFEROL) 50,000 unit Cap Take 1 capsule (50,000 Units total) by mouth every 7 days. Take on Tues, Starting Tue 1/22/2019, Normal      insulin (BASAGLAR KWIKPEN U-100 INSULIN) glargine 100 units/mL (3mL) SubQ pen Inject 16 Units into the skin every evening., Starting Mon 1/21/2019, No Print      INV acyclovir/placebo capsule Take 1 capsule (400 mg total) by mouth every 12 (twelve) hours.  For investigational use only. Protocol Mk 8228-002, Starting Fri 2/1/2019, Outpatient Research      INV MK-8228/placebo 480 mg oral tablet Take 1 tablet (480 mg total) by mouth once daily. FOR INVESTIGATIONAL USE ONLY. Patient Study# [id number]. Protocol: MK 8228-002, Starting Fri 2/1/2019, Outpatient Research      INV valganciclovir/placebo tablet Take 2 tablets (900 mg total) by mouth once daily.  FOR INVESTIGATIONAL USE ONLY. Protocol: MK-8228-002, Starting Fri 2/1/2019, Outpatient Research         STOP taking these medications       oxyCODONE (ROXICODONE) 10 mg Tab immediate release tablet Comments:   Reason for Stopping:             No discharge procedures on file.                "

## 2019-02-06 NOTE — PLAN OF CARE
02/06/19 0914   Discharge Assessment   Assessment Type Discharge Planning Reassessment   HANS spoke to Tarsha at the transfer center and she informed CM that patient was given bed # [id number] at [facility] main @830am. HANS spoke to Christine, 3rd floor charge nurse and she informed that report has been called and they are now waiting on transport which was arranged by transfer center.

## 2019-02-06 NOTE — CONSULTS
[facility]  Endocrinology  Diabetes Consult Note    Consult Requested by: Bogdan Ennis MD   Reason for admit: Near syncope    HISTORY OF PRESENT ILLNESS:  Reason for Consult: Management of T2DM, Hyperglycemia     Surgical Procedure and Date:   2019 - KTx    Diabetes diagnosis year:       Home Diabetes Medications:    Basaglar 23u qHS  Januvia 100mg daily    How often checking glucose at home? 1-3 x day   BG readings on regimen: 170-240s  Hypoglycemia on the regimen?  No  Missed doses on regimen?  No    Diabetes Complications include:     Hyperglycemia    Complicating diabetes co morbidities:   KTx      HPI:   Patient is a 58 y.o. female with a diagnosis of KTx (2019) and T2DM (on above regimen) transferred from other [facility] facility for concern for cerebellar stroke. Endocrinology has been consulted for DM management.    Patient relatively controlled in outpatient setting.  Does have elevated fasting BG levels upon review of BG log.  Had appointment with [facility] endocrinology, but missed appointment due to admission.    Interval HPI:   Overnight events:  NAEO.  Patient transferred from outside facility.    Eatin%  Nausea: No  Hypoglycemia and intervention: No  Fever: No  TPN and/or TF: No    PMH, PSH, FH, SH updated and reviewed     ROS:  Review of Systems   Constitutional: Negative for unexpected weight change.   Eyes: Negative for visual disturbance.   Respiratory: Negative for shortness of breath.    Cardiovascular: Negative for chest pain.   Gastrointestinal: Negative for abdominal pain.   Genitourinary: Negative for urgency.   Musculoskeletal: Negative for arthralgias.   Skin: Negative for wound.   Neurological: Negative for headaches.   Hematological: Does not bruise/bleed easily.   Psychiatric/Behavioral: Negative for sleep disturbance.       Current Medications and/or Treatments Impacting Glycemic Control  Immunotherapy:    Immunosuppressants         Stop Route Frequency      mycophenolate capsule 500 mg      -- Oral 2 times daily     tacrolimus capsule 1.5 mg      -- Oral 2 times daily        Steroids:   Hormones (From admission, onward)    None        Pressors:    Autonomic Drugs (From admission, onward)    None        Hyperglycemia/Diabetes Medications:   Antihyperglycemics (From admission, onward)    Start     Stop Route Frequency Ordered    02/07/19 0900  SITagliptin tablet 100 mg      -- Oral Daily 02/06/19 1514    02/06/19 2100  insulin detemir U-100 pen 24 Units      -- SubQ Nightly 02/06/19 1514    02/06/19 1613  insulin aspart U-100 pen 0-5 Units      -- SubQ Before meals & nightly PRN 02/06/19 1514             PHYSICAL EXAMINATION:  Vitals:    02/06/19 1400   BP: 122/67   Pulse: 83   Resp: 15   Temp:      Body mass index is 34.22 kg/m².    Physical Exam   Constitutional: She appears well-developed.   HENT:   Right Ear: External ear normal.   Left Ear: External ear normal.   Nose: Nose normal.   Hearing normal  Dentition good   Neck: No tracheal deviation present. No thyromegaly present.   Cardiovascular: Normal rate.   No murmur heard.  Pulmonary/Chest: Effort normal and breath sounds normal.   Abdominal: Soft. There is no tenderness. No hernia.   Musculoskeletal: She exhibits no edema or tenderness.   Neurological: She has normal reflexes. No cranial nerve deficit.   Skin: No rash noted.   No nodules   Psychiatric: She has a normal mood and affect. Judgment normal.   Vitals reviewed.        Labs Reviewed and Include   Recent Labs   Lab 02/06/19  0452   *   CALCIUM 8.9   ALBUMIN 3.3*   PROT 6.3      K 4.5   CO2 22*      BUN 20   CREATININE 1.2   ALKPHOS 227*   ALT 30   AST 20   BILITOT 0.4     Lab Results   Component Value Date    WBC 3.20 (L) 02/03/2019    HGB 8.9 (L) 02/03/2019    HCT 27.2 (L) 02/03/2019    MCV 91 02/03/2019     02/03/2019     No results for input(s): TSH, FREET4 in the last 168 hours.  Lab Results   Component Value Date     HGBA1C 6.5 (H) 02/04/2019       Nutritional status:   Body mass index is 34.22 kg/m².  Lab Results   Component Value Date    ALBUMIN 3.3 (L) 02/06/2019    ALBUMIN 3.3 (L) 02/05/2019    ALBUMIN 3.4 (L) 02/04/2019     No results found for: PREALBUMIN    Estimated Creatinine Clearance: 60.3 mL/min (based on SCr of 1.2 mg/dL).    Accu-Checks  Recent Labs     02/03/19  2259 02/04/19  0514 02/04/19  1140 02/04/19  1641 02/04/19  2144 02/05/19  0545 02/05/19  1114 02/05/19  1655 02/05/19  2030 02/06/19  0541   POCTGLUCOSE 204* 197* 190* 176* 265* 220* 241* 206* 243* 207*        ASSESSMENT and PLAN    * Near syncope    Avoid hypoglycemia.  Management per primary       Status post living-donor kidney transplantation    Management per primary  Avoid hypoglycemia         Hypothyroidism    TSH   Date Value Ref Range Status   09/10/2018 0.504 0.400 - 4.000 uIU/mL Final     Free T4   Date Value Ref Range Status   04/12/2018 1.01 0.71 - 1.51 ng/dL Final     Continue home regimen of LT4 75mcg       Uncontrolled type 2 diabetes mellitus with stage 5 chronic kidney disease not on chronic dialysis, with long-term current use of insulin    BG and insulin regimen reviewed.    BG goal 140-180.  Patient's BG above goal.  FBG above goal despite correction insulin.    Recommend:  Increase Levemir to 24u qHS  Start Januvia 100mg daily.  Low dose correction  POC AC/HS    Discharge:  TBD.         Plan discussed with patient, family, and RN at bedside.     Lisbeth Westfall MD  Endocrinology  [facility]

## 2019-02-06 NOTE — PLAN OF CARE
Problem: Adult Inpatient Plan of Care  Goal: Plan of Care Review  Outcome: Ongoing (interventions implemented as appropriate)  Patient is AAOx3, independent. Patient denies any pain or nausea. RLQ incision BA with staples. Patient reports that the staples were due to be removed today and her stent was to be removed on Monday. KTS is aware. Blood cultures x2 and urine sample sent for testing. Endocrine consulted and patient placed on an ADA diet. Patient is scheduled for a MRI and will receive Valium before the test. Reminded the patient and her SO to call for assistance. Call light and personal items are within reach.

## 2019-02-06 NOTE — HPI
59 yo female with pmh of ESRD secondary DM Type II and HTN.  Now s/p living kidney transplant on 1/14/19.  Admitted to [facility] for concern of cerebellar stroke. Reports being in normal state of health until 2300 2/3/19 when she woke up and felt especially dizzy while walking to the bathroom. She didn't think much of it at the time and was able to make it to/from the bathroom without incident. However, this AM on rising she reports her symptoms continued.  Given persistence pt was presented to the ED for evaluation.  Now transferred to [facility] for further evaluation with MRI brain.

## 2019-02-07 VITALS
OXYGEN SATURATION: 98 % | RESPIRATION RATE: 18 BRPM | BODY MASS INDEX: 34.19 KG/M2 | TEMPERATURE: 98 F | HEIGHT: 67 IN | WEIGHT: 217.81 LBS | DIASTOLIC BLOOD PRESSURE: 80 MMHG | HEART RATE: 80 BPM | SYSTOLIC BLOOD PRESSURE: 136 MMHG

## 2019-02-07 DIAGNOSIS — Z87.898 HISTORY OF UNSTEADY GAIT: ICD-10-CM

## 2019-02-07 DIAGNOSIS — Z94.0 KIDNEY REPLACED BY TRANSPLANT: Primary | ICD-10-CM

## 2019-02-07 LAB
ALBUMIN SERPL BCP-MCNC: 3.2 G/DL
ANION GAP SERPL CALC-SCNC: 6 MMOL/L
ANISOCYTOSIS BLD QL SMEAR: SLIGHT
BASOPHILS # BLD AUTO: 0.01 K/UL
BASOPHILS NFR BLD: 0 %
BASOPHILS NFR BLD: 0.4 %
BUN SERPL-MCNC: 18 MG/DL
CALCIUM SERPL-MCNC: 8.8 MG/DL
CHLORIDE SERPL-SCNC: 108 MMOL/L
CO2 SERPL-SCNC: 26 MMOL/L
CREAT SERPL-MCNC: 1 MG/DL
DIFFERENTIAL METHOD: ABNORMAL
DIFFERENTIAL METHOD: ABNORMAL
EOSINOPHIL # BLD AUTO: 0.1 K/UL
EOSINOPHIL NFR BLD: 3.6 %
EOSINOPHIL NFR BLD: 6 %
ERYTHROCYTE [DISTWIDTH] IN BLOOD BY AUTOMATED COUNT: 13.1 %
ERYTHROCYTE [DISTWIDTH] IN BLOOD BY AUTOMATED COUNT: 13.2 %
EST. GFR  (AFRICAN AMERICAN): >60 ML/MIN/1.73 M^2
EST. GFR  (NON AFRICAN AMERICAN): >60 ML/MIN/1.73 M^2
GLUCOSE SERPL-MCNC: 193 MG/DL
HAPTOGLOB SERPL-MCNC: 157 MG/DL
HCT VFR BLD AUTO: 23.2 %
HCT VFR BLD AUTO: 24.3 %
HGB BLD-MCNC: 7.3 G/DL
HGB BLD-MCNC: 7.8 G/DL
HYPOCHROMIA BLD QL SMEAR: ABNORMAL
IMM GRANULOCYTES # BLD AUTO: 0.04 K/UL
IMM GRANULOCYTES # BLD AUTO: ABNORMAL K/UL
IMM GRANULOCYTES NFR BLD AUTO: 1.8 %
IMM GRANULOCYTES NFR BLD AUTO: ABNORMAL %
LDH SERPL L TO P-CCNC: 188 U/L
LYMPHOCYTES # BLD AUTO: 0.2 K/UL
LYMPHOCYTES NFR BLD: 4 %
LYMPHOCYTES NFR BLD: 7.6 %
MAGNESIUM SERPL-MCNC: 1.6 MG/DL
MCH RBC QN AUTO: 30.2 PG
MCH RBC QN AUTO: 30.2 PG
MCHC RBC AUTO-ENTMCNC: 31.5 G/DL
MCHC RBC AUTO-ENTMCNC: 32.1 G/DL
MCV RBC AUTO: 94 FL
MCV RBC AUTO: 96 FL
MONOCYTES # BLD AUTO: 0.4 K/UL
MONOCYTES NFR BLD: 19.6 %
MONOCYTES NFR BLD: 20 %
NEUTROPHILS # BLD AUTO: 1.5 K/UL
NEUTROPHILS NFR BLD: 66 %
NEUTROPHILS NFR BLD: 67 %
NEUTS BAND NFR BLD MANUAL: 4 %
NRBC BLD-RTO: 0 /100 WBC
NRBC BLD-RTO: 0 /100 WBC
OVALOCYTES BLD QL SMEAR: ABNORMAL
PHOSPHATE SERPL-MCNC: 3.5 MG/DL
PLATELET # BLD AUTO: 176 K/UL
PLATELET # BLD AUTO: 186 K/UL
PLATELET BLD QL SMEAR: ABNORMAL
PMV BLD AUTO: 10.3 FL
PMV BLD AUTO: 10.4 FL
POCT GLUCOSE: 217 MG/DL (ref 70–110)
POIKILOCYTOSIS BLD QL SMEAR: SLIGHT
POLYCHROMASIA BLD QL SMEAR: ABNORMAL
POTASSIUM SERPL-SCNC: 4.5 MMOL/L
RBC # BLD AUTO: 2.42 M/UL
RBC # BLD AUTO: 2.58 M/UL
SODIUM SERPL-SCNC: 140 MMOL/L
TACROLIMUS BLD-MCNC: 7.3 NG/ML
WBC # BLD AUTO: 1.93 K/UL
WBC # BLD AUTO: 2.25 K/UL

## 2019-02-07 PROCEDURE — 80069 RENAL FUNCTION PANEL: CPT

## 2019-02-07 PROCEDURE — 99238 HOSP IP/OBS DSCHRG MGMT 30/<: CPT | Mod: 24,,, | Performed by: NURSE PRACTITIONER

## 2019-02-07 PROCEDURE — 97165 OT EVAL LOW COMPLEX 30 MIN: CPT

## 2019-02-07 PROCEDURE — G8980 MOBILITY D/C STATUS: HCPCS | Mod: CH

## 2019-02-07 PROCEDURE — 83010 ASSAY OF HAPTOGLOBIN QUANT: CPT

## 2019-02-07 PROCEDURE — 97161 PT EVAL LOW COMPLEX 20 MIN: CPT

## 2019-02-07 PROCEDURE — 99238 PR HOSPITAL DISCHARGE DAY,<30 MIN: ICD-10-PCS | Mod: 24,,, | Performed by: NURSE PRACTITIONER

## 2019-02-07 PROCEDURE — G8987 SELF CARE CURRENT STATUS: HCPCS | Mod: CI

## 2019-02-07 PROCEDURE — 63600175 PHARM REV CODE 636 W HCPCS: Performed by: NURSE PRACTITIONER

## 2019-02-07 PROCEDURE — G8978 MOBILITY CURRENT STATUS: HCPCS | Mod: CH

## 2019-02-07 PROCEDURE — 85025 COMPLETE CBC W/AUTO DIFF WBC: CPT

## 2019-02-07 PROCEDURE — 99231 PR SUBSEQUENT HOSPITAL CARE,LEVL I: ICD-10-PCS | Mod: ,,, | Performed by: NURSE PRACTITIONER

## 2019-02-07 PROCEDURE — G8979 MOBILITY GOAL STATUS: HCPCS | Mod: CH

## 2019-02-07 PROCEDURE — 97116 GAIT TRAINING THERAPY: CPT

## 2019-02-07 PROCEDURE — 85027 COMPLETE CBC AUTOMATED: CPT

## 2019-02-07 PROCEDURE — 83615 LACTATE (LD) (LDH) ENZYME: CPT

## 2019-02-07 PROCEDURE — 86747 PARVOVIRUS ANTIBODY: CPT

## 2019-02-07 PROCEDURE — 83735 ASSAY OF MAGNESIUM: CPT

## 2019-02-07 PROCEDURE — 85007 BL SMEAR W/DIFF WBC COUNT: CPT

## 2019-02-07 PROCEDURE — G8988 SELF CARE GOAL STATUS: HCPCS | Mod: CI

## 2019-02-07 PROCEDURE — 99231 SBSQ HOSP IP/OBS SF/LOW 25: CPT | Mod: ,,, | Performed by: NURSE PRACTITIONER

## 2019-02-07 PROCEDURE — 25000003 PHARM REV CODE 250: Performed by: INTERNAL MEDICINE

## 2019-02-07 PROCEDURE — G8989 SELF CARE D/C STATUS: HCPCS | Mod: CI

## 2019-02-07 PROCEDURE — 25000003 PHARM REV CODE 250: Performed by: NURSE PRACTITIONER

## 2019-02-07 PROCEDURE — 63600175 PHARM REV CODE 636 W HCPCS: Performed by: INTERNAL MEDICINE

## 2019-02-07 PROCEDURE — 36415 COLL VENOUS BLD VENIPUNCTURE: CPT

## 2019-02-07 PROCEDURE — 80197 ASSAY OF TACROLIMUS: CPT

## 2019-02-07 RX ORDER — LORAZEPAM 2 MG/ML
1 INJECTION INTRAMUSCULAR ONCE
Status: DISCONTINUED | OUTPATIENT
Start: 2019-02-07 | End: 2019-02-07 | Stop reason: HOSPADM

## 2019-02-07 RX ORDER — MIDAZOLAM HYDROCHLORIDE 1 MG/ML
1 INJECTION INTRAMUSCULAR; INTRAVENOUS
Status: DISCONTINUED | OUTPATIENT
Start: 2019-02-07 | End: 2019-02-07 | Stop reason: HOSPADM

## 2019-02-07 RX ORDER — ATOVAQUONE 750 MG/5ML
1500 SUSPENSION ORAL DAILY
Qty: 300 ML | Refills: 11 | Status: ON HOLD | OUTPATIENT
Start: 2019-02-07 | End: 2019-04-05 | Stop reason: HOSPADM

## 2019-02-07 RX ORDER — INSULIN GLARGINE 100 [IU]/ML
25 INJECTION, SOLUTION SUBCUTANEOUS NIGHTLY
Qty: 15 ML | Refills: 0
Start: 2019-02-07 | End: 2019-03-06

## 2019-02-07 RX ADMIN — MYCOPHENOLATE MOFETIL 500 MG: 250 CAPSULE ORAL at 08:02

## 2019-02-07 RX ADMIN — DAPSONE 100 MG: 100 TABLET ORAL at 08:02

## 2019-02-07 RX ADMIN — LEVOTHYROXINE SODIUM 75 MCG: 25 TABLET ORAL at 08:02

## 2019-02-07 RX ADMIN — INSULIN ASPART 2 UNITS: 100 INJECTION, SOLUTION INTRAVENOUS; SUBCUTANEOUS at 04:02

## 2019-02-07 RX ADMIN — SITAGLIPTIN 100 MG: 50 TABLET, FILM COATED ORAL at 08:02

## 2019-02-07 RX ADMIN — FAMOTIDINE 20 MG: 20 TABLET ORAL at 08:02

## 2019-02-07 RX ADMIN — MAGNESIUM OXIDE TAB 400 MG (241.3 MG ELEMENTAL MG) 800 MG: 400 (241.3 MG) TAB at 08:02

## 2019-02-07 RX ADMIN — TACROLIMUS 1.5 MG: 1 CAPSULE ORAL at 08:02

## 2019-02-07 RX ADMIN — ATORVASTATIN CALCIUM 40 MG: 20 TABLET, FILM COATED ORAL at 08:02

## 2019-02-07 RX ADMIN — THERA TABS 1 TABLET: TAB at 08:02

## 2019-02-07 RX ADMIN — VILAZODONE HYDROCHLORIDE 40 MG: 10 TABLET ORAL at 08:02

## 2019-02-07 RX ADMIN — LORAZEPAM 1 MG: 1 TABLET ORAL at 08:02

## 2019-02-07 NOTE — PT/OT/SLP EVAL
"Physical Therapy Evaluation and Discharge Note    Patient Name:  Andra Newell   MRN:  [id number]    Recommendations:     Discharge Recommendations:  (Outpatient PT)   Discharge Equipment Recommendations: none   Barriers to discharge: None    Assessment:     Andra Newell is a 58 y.o. female admitted with a medical diagnosis of Near syncope. Pt is s/p liver transplant as of 1/14/19.  At this time, patient is functioning at their prior level of function and does not require further acute PT services.  Recommendation for pt to receive skilled PT services in the outpatient setting to address complications associated with L hip jt dysfunction.      Recent Surgery: * No surgery found *      Plan:     During this hospitalization, patient does not require further acute PT services.  Please re-consult if situation changes.      Subjective     Chief Complaint: Difficulty with ADLs following sx  Patient/Family Comments/goals: To receive THR  Pain/Comfort:  · Pain Rating 1: 0/10    Patients cultural, spiritual, [employer] conflicts given the current situation: no    Living Environment:  Pt lives with spouse, H, ramp to enter.    Prior to admission, patients level of function requires use of power scooter for outside mobility, Amb I indoors, requires A with dressing and bathing following recent sx.  Equipment used at home: bath bench, bedside commode, walker, rolling, grab bar, glucometer, power chair.  DME owned (not currently used): none.  Upon discharge, patient will have assistance from spouse.    Objective:     Communicated with nursing prior to session.  Patient found in supine upon PT entry to room found with: peripheral IV(AV fistula)     "Just since the surgery."    General Precautions: Standard, fall   Orthopedic Precautions:N/A   Braces: N/A     Exams:  · Cognitive Exam:  Patient is oriented to Person, Place, Time and Situation  · Fine Motor Coordination:    · -       Intact  Left hand thumb/finger " opposition skills and Right hand thumb/finger opposition skills  · Gross Motor Coordination:  WFL  · Postural Exam:  Patient presented with the following abnormalities:    · -       No postural abnormalities identified  · Sensation:    · -       Intact  light/touch B LEs  · Skin Integrity/Edema:      · -       Skin integrity: Visible skin intact  · -       Edema: None noted B LEs  · RUE ROM: WFL  · RUE Strength: WFL  · LUE ROM: WFL  · LUE Strength: WFL  · RLE ROM: WFL  · RLE Strength: WFL  · LLE ROM: WFL  · LLE Strength: WFL    Functional Mobility:  · Bed Mobility:     · Scooting: independence  · Supine to Sit: independence  · Sit to Supine: independence  · Transfers:     · Sit to Stand:  independence with no AD  · Bed to Chair: independence with  no AD  using  Stand Pivot  · Toilet Transfer: independence with  no AD  using  Stand Pivot  · Gait: Pt amb >400', I, without AD, no episodes of LOB  · Balance: I: dynamic standing balance without AD    AM-[facility] 6 CLICK MOBILITY  Total Score:24       Therapeutic Activities and Exercises:   PT POC established with pt and spouse  Whiteboard updated    AM-P[facility] 6 CLICK MOBILITY  Total Score:24     Patient left supine with all lines intact and call button in reach.    GOALS:   Multidisciplinary Problems     Physical Therapy Goals     Not on file                History:     Past Medical History:   Diagnosis Date    Anemia     Anemia in chronic kidney disease, on chronic dialysis 7/12/2017    Arthritis     Asthma     allergic airway    Colon polyp     Depression     Diabetes mellitus     Diverticulosis     Eosinophilia     ESRD (end stage renal disease)     stage V, due for living donor 9/2018    ESRD on dialysis     GERD (gastroesophageal reflux disease)     Gout     Gout     Hyperlipidemia     Hypertension     Low back pain     MRSA carrier     Obesity     Renal disorder     Rotator cuff syndrome--left     Thyroid disease     Ulnar neuropathy of right upper  extremity     Uncontrolled type 2 diabetes mellitus with hyperglycemia        Past Surgical History:   Procedure Laterality Date    ACHILLES TENDON SURGERY Left May 2015    ARTHROSCOPY, HIP Left 10/3/2013    Performed by Moe Meléndez MD at [facility] [place] OR    ARTHROSCOPY-HIP WITH TROCHANTERIC BURSECTOMY Left 10/3/2013    Performed by Moe Meléndez MD at [facility] [place] [place]    ARTHROSCOPY-SHOULDER Left 11/24/2015    Performed by Moe Meléndez MD at [facility] [place] [place]    ARTHROSCOPY-SHOULDER WITH SUBACROMIAL DECOMPRESSION Left 7/12/2016    Performed by Moe Meléndez MD at [facility] [place]    BACK SURGERY      CHOLECYSTECTOMY      COLONOSCOPY N/A 9/6/2017    [facility]    DEBRIDEMENT-SHOULDER-ARTHROSCOPIC Left 7/12/2016    Performed by Moe Meléndez MD at [facility] [place]    DEBRIDEMENT-SHOULDER-ARTHROSCOPIC; LABRAL Left 11/24/2015    Performed by Moe Meléndez MD at [facility] [place]    DIALYSIS FISTULA CREATION  12/2017    FEMOROPLASTY, ARTHROSCOPIC Left 10/3/2013    Performed by Moe Meléndez MD at [facility] [place] [place]    HEMORRHOID SURGERY      INJECTION-AMNIOX Left 7/12/2016    Performed by Moe Meléndez MD at [facility] [place]    JOINT REPLACEMENT      left    KNEE SURGERY      LYSIS-ADHESION Left 11/24/2015    Performed by Moe Meléndez MD at [facility] [place] [place]    REPAIR ROTATOR CUFF ARTHROSCOPIC Left 7/12/2016    Performed by Moe Meléndez MD at [facility] [place] [place]    REPAIR-TENDON-BICEP Left 11/24/2015    Performed by Moe Meléndez MD at [facility] [place]    SHOULDER ARTHROSCOPY      SHOULDER SURGERY      TRANSPLANT, KIDNEY N/A 1/14/2019    [facility]    UPPER GASTROINTESTINAL ENDOSCOPY  ~2013     [place]       Time Tracking:     PT Received On: 02/07/19  PT Start Time: 1055     PT Stop Time: 1119  PT Total Time (min): 24 min     Billable Minutes: Evaluation 8 and Gait Training 12      Rachele Simons, PT  02/07/2019

## 2019-02-07 NOTE — PROGRESS NOTES
Discharge note:  SW met with pt and SO at bedside. Pt was AAOx4 and lying in bed. Pt denied any psychosocial needs at this time. Pt is being transported by spouse home to [facility]. SW remains available.

## 2019-02-07 NOTE — PLAN OF CARE
AAO x 4. VSS, afebrile, SpO2>95% on RA. BG monitoring no coverage indicated. RLQ incision BA with staples. Plan for MRI brain tomorrow with Versed. Pt denies pain at this time. Fall precautions maintained and pt repositions self. See flowsheet for assessment findings. Will continue to monitor.

## 2019-02-07 NOTE — ASSESSMENT & PLAN NOTE
BG goal 140-180.    Recommend:  Increase Levemir to 26 units qHS  Continue Januvia 100mg daily.  Continue Low dose correction  POC AC/HS    Discharge:  Provide BG logs, and have patient follow up with [facility] endocrinology clinic.     Basaglar 26 units HS     Januvia 1000 mg     Novolog Low Dose SQ Insulin Correction Scale.    Discussed the need for insulin, how insulin works, what makes it a high risk medication, the importance of immediate follow up with either PCP or endocrine provider, importance of and how to check BG, how to record BG on logs, how to administer insulin, appropriate insulin administration sites, importance of rotating injection sites, hyper/hypoglycemia, how and when to treat hypoglycemia, when to hold insulin, how the correction scale works, importance of storing unused insulin in the refrigerator, and when to seek medical attention.  Patient verbalized understanding, answered all questions to patient's satisfaction.

## 2019-02-07 NOTE — PLAN OF CARE
Problem: Occupational Therapy Goal  Goal: Occupational Therapy Goal  Outcome: Outcome(s) achieved Date Met: 02/07/19  Pt presents with no functional impairments that would affect her ability to complete her self care tasks and functional mobility on this date. No further skilled OT needed in this setting.       Comments: Cammie Tovar OTR/L

## 2019-02-07 NOTE — PLAN OF CARE
Problem: Adult Inpatient Plan of Care  Goal: Plan of Care Review  Plan of care reviewed on am rounds, afrebrile, vss, denies any c/o weakness, dizziness. Wbc 1.93, Dapsone and Cellcept dc'd,  H/h 7.8/ 24.3, K 4.5 Cr 1, voiding without difficulty, tolerating diet without n/v. Ambulated in garcia with PT, tolerated well. MRI unable to be done, will be rescheduled open MRI as outpatient. Sig other at  and both updated on plan of care. Emotional support provided to both

## 2019-02-07 NOTE — PROGRESS NOTES
"Discharge Medication Note:    Hospital Course:  Ms Martinez is s/p kidney transplant from 1/14/19 admitted from outside hospital due to complaints of dizziness and symptoms concerning from stroke.  Symptoms now resolved - will get brain MRI outpatient.  Of note, WBC and Hgb low today - will hold MMF and changed dapsone to atovaquone.  No other med changes.     Met with Andra Newell at discharge to review discharge medications and to update the blue medication card.       Andra Newell   Home Medication Instructions MARICARMEN:[id number]    Printed on:02/07/19 9385   Medication Information                      atorvastatin (LIPITOR) 40 MG tablet  TAKE ONE TABLET BY MOUTH ONCE DAILY             atovaquone (MEPRON) 750 mg/5 mL Susp  Take 10 mLs (1,500 mg total) by mouth once daily. Stop: 1/14/20             BD ULTRA-FINE MINI PEN NEEDLE 31 gauge x 3/16" Ndle  USE ONE NEEDLE ONCE DAILY             ergocalciferol (ERGOCALCIFEROL) 50,000 unit Cap  Take 1 capsule (50,000 Units total) by mouth every 7 days. Take on Tues             famotidine (PEPCID) 20 MG tablet  Take 1 tablet (20 mg total) by mouth 2 (two) times daily.             insulin (BASAGLAR KWIKPEN U-100 INSULIN) glargine 100 units/mL (3mL) SubQ pen  Inject 25 Units into the skin every evening.             INV acyclovir/placebo capsule  Take 1 capsule (400 mg total) by mouth every 12 (twelve) hours.  For investigational use only. Protocol Mk 8228-002             INV MK-8228/placebo 480 mg oral tablet  Take 1 tablet (480 mg total) by mouth once daily. FOR INVESTIGATIONAL USE ONLY. Patient Study# [id number]. Protocol: MK 8228-002             INV valganciclovir/placebo tablet  Take 2 tablets (900 mg total) by mouth once daily.  FOR INVESTIGATIONAL USE ONLY. Protocol: MK-8228-002             levothyroxine (SYNTHROID) 75 MCG tablet  TAKE ONE TABLET BY MOUTH ONCE DAILY             LORazepam (ATIVAN) 2 MG Tab  Take 1 tablet (2 mg total) by mouth 2 (two) times " daily.             magnesium oxide (MAG-OX) 400 mg (241.3 mg magnesium) tablet  Take 2 tablets (800 mg total) by mouth once daily.             multivitamin (THERAGRAN) tablet  Take 1 tablet by mouth once daily.             SITagliptin (JANUVIA) 100 MG Tab  Take 1 tablet (100 mg total) by mouth once daily.             tacrolimus (PROGRAF) 0.5 MG Cap  Take 3 capsules (1.5 mg total) by mouth every morning AND 3 capsules (1.5 mg total) every evening. Z94.0 kidney transplant.             VIIBRYD 40 mg Tab tablet  TAKE ONE TABLET BY MOUTH ONCE DAILY                 Pt was provided with prescriptions for the following meds:  E-rx: atovaquone  Printed rx: none  Phone-in or faxed rx: none to none pharmacy per pt request.    The following medications have been placed on HOLD and should be restarted in the outpatient setting (when appropriate): MMF (WBC)    Andra Newell verbalized understanding and had the opportunity to ask questions.

## 2019-02-07 NOTE — DISCHARGE SUMMARY
[facility]  Kidney Transplant  Discharge Summary    Patient Name: Andra Newell  MRN: [id number]  Admission Date: 2/6/2019  Hospital Length of Stay: 1 days  Discharge Date and Time:  02/07/2019 4:06 PM  Attending Physician: Bogdan Ennis MD   Discharging Provider: Shante Green NP  Primary Care Provider: Gita Cohen MD    HPI:   57 yo female with pmh of ESRD secondary DM Type II and HTN.  Now s/p living kidney transplant on 1/14/19.  Admitted to [facility] for concern of cerebellar stroke. Reports being in normal state of health until 2300 2/3/19 when she woke up and felt especially dizzy while walking to the bathroom. She didn't think much of it at the time and was able to make it to/from the bathroom without incident. However, this AM on rising she reports her symptoms continued.  Given persistence pt was presented to the ED for evaluation.  Now transferred to [facility] for further evaluation with MRI brain.        * No surgery found *     Hospital Course:    Transferred to [facility] to obtain MRI of brain.  However needs to be sedated for testing, which cannot be done in patient.  Patient will be discharged and schedule MRI outpatient.    RLQ incision with staples.  Staples removed and steri strips placed.  Incision well approximated with no drainage/redness/swelling.    Patient stable and ready for discharge.  Follow labs 2/7 and 2/11.  Stent removal scheduled 2/11 along with follow up nephrology appointment.    Final Active Diagnoses:    Diagnosis Date Noted POA    PRINCIPAL PROBLEM:  Near syncope [R55] 02/04/2019 Yes    Long term current use of immunosuppressive drug [Z79.899] 02/06/2019 Not Applicable    At risk for opportunistic infections [Z91.89] 01/15/2019 Yes    Status post living-donor kidney transplantation [Z94.0] 01/15/2019 Not Applicable    Vitamin D deficiency [E55.9] 01/15/2019 Yes    Prophylactic immunotherapy [Z29.8] 09/06/2017 Not Applicable    HLD  (hyperlipidemia) [E78.5] 07/12/2017 Yes     Chronic    Hypothyroidism [E03.9] 03/27/2017 Yes    Depression [F32.9] 01/21/2015 Yes    Uncontrolled type 2 diabetes mellitus with stage 5 chronic kidney disease not on chronic dialysis, with long-term current use of insulin [E11.22, E11.65, N18.5, Z79.4] 09/27/2013 Not Applicable      Problems Resolved During this Admission:       Treatments: see above    Consults (From admission, onward)        Status Ordering Provider     Inpatient consult to Endocrinology  Once     Provider:  (Not yet assigned)    Completed TRAVIS OLIVER          Pending Diagnostic Studies:     Procedure Component Value Units Date/Time    CMV DNA, quantitative, PCR [[id number]] Collected:  02/07/19 1237    Order Status:  Sent Lab Status:  In process Updated:  02/07/19 1252    Specimen:  Blood     Parvovirus B19 antibody, IgG and IgM [[id number]] Collected:  02/07/19 1237    Order Status:  Sent Lab Status:  In process Updated:  02/07/19 1252    Specimen:  Blood         Significant Diagnostic Studies: Labs:   BMP:   Recent Labs   Lab 02/06/19  0452 02/07/19  0643   * 193*    140   K 4.5 4.5    108   CO2 22* 26   BUN 20 18   CREATININE 1.2 1.0   CALCIUM 8.9 8.8   MG 1.6 1.6    and CBC   Recent Labs   Lab 02/07/19  0643 02/07/19  1237   WBC 1.93* 2.25*   HGB 7.3* 7.8*   HCT 23.2* 24.3*    186       Discharged Condition: good    Disposition: Home or Self Care    Follow Up:    Patient Instructions:      Notify your health care provider if you experience any of the following:  temperature >100.4     Notify your health care provider if you experience any of the following:  persistent nausea and vomiting or diarrhea     Notify your health care provider if you experience any of the following:  increased confusion or weakness     Notify your health care provider if you experience any of the following:  persistent dizziness, light-headedness, or visual disturbances     Notify  "your health care provider if you experience any of the following:  worsening rash     Notify your health care provider if you experience any of the following:  severe persistent headache     Notify your health care provider if you experience any of the following:  difficulty breathing or increased cough     Notify your health care provider if you experience any of the following:  redness, tenderness, or signs of infection (pain, swelling, redness, odor or green/yellow discharge around incision site)     Notify your health care provider if you experience any of the following:  severe uncontrolled pain     No dressing needed     Activity as tolerated     Medications:  Reconciled Home Medications:      Medication List      START taking these medications    atovaquone 750 mg/5 mL Susp  Commonly known as:  MEPRON  Take 10 mLs (1,500 mg total) by mouth once daily. Stop: 1/14/20        CHANGE how you take these medications    insulin glargine 100 units/mL (3mL) SubQ pen  Commonly known as:  BASAGLAR KWIKPEN U-100 INSULIN  Inject 25 Units into the skin every evening.  What changed:  how much to take     LORazepam 2 MG Tab  Commonly known as:  ATIVAN  Take 1 tablet (2 mg total) by mouth 2 (two) times daily.  What changed:  how much to take        CONTINUE taking these medications    atorvastatin 40 MG tablet  Commonly known as:  LIPITOR  TAKE ONE TABLET BY MOUTH ONCE DAILY     BD ULTRA-FINE MINI PEN NEEDLE 31 gauge x 3/16" Ndle  Generic drug:  pen needle, diabetic  USE ONE NEEDLE ONCE DAILY     famotidine 20 MG tablet  Commonly known as:  PEPCID  Take 1 tablet (20 mg total) by mouth 2 (two) times daily.     INV acyclovir/placebo 400 MG capsule  Take 1 capsule (400 mg total) by mouth every 12 (twelve) hours.  For investigational use only. Protocol Mk 8228-002     INV MK-8228/placebo 480 mg oral tablet  Take 1 tablet (480 mg total) by mouth once daily. FOR INVESTIGATIONAL USE ONLY. Patient Study# [id number]. Protocol: GABBY " 8228-002     INV valganciclovir/placebo 450 mg tablet  Take 2 tablets (900 mg total) by mouth once daily.  FOR INVESTIGATIONAL USE ONLY. Protocol: MK-8228-002     JANUVIA 100 MG Tab  Generic drug:  SITagliptin  Take 1 tablet (100 mg total) by mouth once daily.     levothyroxine 75 MCG tablet  Commonly known as:  SYNTHROID  TAKE ONE TABLET BY MOUTH ONCE DAILY     magnesium oxide 400 mg (241.3 mg magnesium) tablet  Commonly known as:  MAG-OX  Take 2 tablets (800 mg total) by mouth once daily.     multivitamin tablet  Commonly known as:  THERAGRAN  Take 1 tablet by mouth once daily.     tacrolimus 0.5 MG Cap  Commonly known as:  PROGRAF  Take 3 capsules (1.5 mg total) by mouth every morning AND 3 capsules (1.5 mg total) every evening. Z94.0 kidney transplant.     VIIBRYD 40 mg Tab tablet  Generic drug:  vilazodone  TAKE ONE TABLET BY MOUTH ONCE DAILY     VITAMIN D2 50,000 unit Cap  Generic drug:  ergocalciferol  Take 1 capsule (50,000 Units total) by mouth every 7 days. Take on Tues        STOP taking these medications    mycophenolate 250 mg Cap  Commonly known as:  CELLCEPT          Time spent caring for patient (Greater than 1/2 spent in direct face-to-face contact): < 30 minutes    Shante Green NP  Kidney Transplant  [facility]

## 2019-02-07 NOTE — PROGRESS NOTES
Pt's nurse told we would give Versed in MRI / nurse called Boogie RN #[id number] and explained we can give Ativan  but not Versed / will talk to Charge and call MRI back

## 2019-02-07 NOTE — PROGRESS NOTES
"[facility]  Endocrinology  Progress Note    Admit Date: 2019     Reason for Consult: Management of T2DM, Hyperglycemia     Surgical Procedure and Date:   2019 - KTx    Diabetes diagnosis year:       Home Diabetes Medications:    Basaglar 23u qHS  Januvia 100mg daily    How often checking glucose at home? 1-3 x day   BG readings on regimen: 170-240s  Hypoglycemia on the regimen?  No  Missed doses on regimen?  No    Diabetes Complications include:     Hyperglycemia    Complicating diabetes co morbidities:   KTx      HPI:   Patient is a 58 y.o. female with a diagnosis of KTx (2019) and T2DM (on above regimen) transferred from other [facility] for concern for cerebellar stroke. Endocrinology has been consulted for DM management.    Patient relatively controlled in outpatient setting.  Does have elevated fasting BG levels upon review of BG log.  Had appointment with [facility] endocrinology, but missed appointment due to admission.    Interval HPI:   Overnight events:     NAEON. BG is above goal on current SQ/PO regimen. Possible discharge scheduled for today. Patient endorses comfort with self administering SQ injections at home.     Eatin%  Nausea: No  Hypoglycemia and intervention: No  Fever: No  TPN and/or TF: No  If yes, type of TF/TPN and rate: None    /71 (BP Location: Right arm, Patient Position: Lying)   Pulse 80   Temp 98.2 °F (36.8 °C) (Oral)   Resp 18   Ht 5' 7" (1.702 m)   Wt 98.8 kg (217 lb 13 oz)   LMP 2012   SpO2 98%   Breastfeeding? No   BMI 34.11 kg/m²      Labs Reviewed and Include    Recent Labs   Lab 19  0643   *   CALCIUM 8.8   ALBUMIN 3.2*      K 4.5   CO2 26      BUN 18   CREATININE 1.0     Lab Results   Component Value Date    WBC 1.93 (LL) 2019    HGB 7.3 (L) 2019    HCT 23.2 (L) 2019    MCV 96 2019     2019     No results for input(s): TSH, FREET4 in the last 168 " hours.  Lab Results   Component Value Date    HGBA1C 6.5 (H) 02/04/2019       Nutritional status:   Body mass index is 34.11 kg/m².  Lab Results   Component Value Date    ALBUMIN 3.2 (L) 02/07/2019    ALBUMIN 3.3 (L) 02/06/2019    ALBUMIN 3.3 (L) 02/05/2019     No results found for: PREALBUMIN    Estimated Creatinine Clearance: 72.3 mL/min (based on SCr of 1 mg/dL).    Accu-Checks  Recent Labs     02/04/19  1641 02/04/19  2144 02/05/19  0545 02/05/19  1114 02/05/19  1655 02/05/19  2030 02/06/19  0541 02/06/19  1754 02/06/19 2015   POCTGLUCOSE 176* 265* 220* 241* 206* 243* 207* 190* 216*       Current Medications and/or Treatments Impacting Glycemic Control  Immunotherapy:    Immunosuppressants         Stop Route Frequency     tacrolimus capsule 1.5 mg      -- Oral 2 times daily        Steroids:   Hormones (From admission, onward)    None        Pressors:    Autonomic Drugs (From admission, onward)    None        Hyperglycemia/Diabetes Medications:   Antihyperglycemics (From admission, onward)    Start     Stop Route Frequency Ordered    02/07/19 0900  SITagliptin tablet 100 mg      -- Oral Daily 02/06/19 1514    02/06/19 2100  insulin detemir U-100 pen 24 Units      -- SubQ Nightly 02/06/19 1514    02/06/19 1613  insulin aspart U-100 pen 0-5 Units      -- SubQ Before meals & nightly PRN 02/06/19 1514          ASSESSMENT and PLAN    Uncontrolled type 2 diabetes mellitus with stage 5 chronic kidney disease not on chronic dialysis, with long-term current use of insulin    BG goal 140-180.    Recommend:  Increase Levemir to 26 units qHS  Continue Januvia 100mg daily.  Continue Low dose correction  POC AC/HS    Discharge:  Provide BG logs, and have patient follow up with [facility] endocrinology clinic.     Basaglar 26 units HS     Januvia 1000 mg     Novolog Low Dose SQ Insulin Correction Scale.    Discussed the need for insulin, how insulin works, what makes it a high risk medication, the importance of immediate follow up  with either PCP or endocrine provider, importance of and how to check BG, how to record BG on logs, how to administer insulin, appropriate insulin administration sites, importance of rotating injection sites, hyper/hypoglycemia, how and when to treat hypoglycemia, when to hold insulin, how the correction scale works, importance of storing unused insulin in the refrigerator, and when to seek medical attention.  Patient verbalized understanding, answered all questions to patient's satisfaction.        Status post living-donor kidney transplantation    Management per primary  Avoid hypoglycemia         Prophylactic immunotherapy    may elevate BG readings             Tre Sykes NP  Endocrinology  [facility]

## 2019-02-07 NOTE — SUBJECTIVE & OBJECTIVE
"Interval HPI:   Overnight events:     NAEON. BG is above goal on current SQ/PO regimen. Possible discharge scheduled for today. Patient endorses comfort with self administering SQ injections at home.     Eatin%  Nausea: No  Hypoglycemia and intervention: No  Fever: No  TPN and/or TF: No  If yes, type of TF/TPN and rate: None    /71 (BP Location: Right arm, Patient Position: Lying)   Pulse 80   Temp 98.2 °F (36.8 °C) (Oral)   Resp 18   Ht 5' 7" (1.702 m)   Wt 98.8 kg (217 lb 13 oz)   LMP 2012   SpO2 98%   Breastfeeding? No   BMI 34.11 kg/m²     Labs Reviewed and Include    Recent Labs   Lab 19  0643   *   CALCIUM 8.8   ALBUMIN 3.2*      K 4.5   CO2 26      BUN 18   CREATININE 1.0     Lab Results   Component Value Date    WBC 1.93 (LL) 2019    HGB 7.3 (L) 2019    HCT 23.2 (L) 2019    MCV 96 2019     2019     No results for input(s): TSH, FREET4 in the last 168 hours.  Lab Results   Component Value Date    HGBA1C 6.5 (H) 2019       Nutritional status:   Body mass index is 34.11 kg/m².  Lab Results   Component Value Date    ALBUMIN 3.2 (L) 2019    ALBUMIN 3.3 (L) 2019    ALBUMIN 3.3 (L) 2019     No results found for: PREALBUMIN    Estimated Creatinine Clearance: 72.3 mL/min (based on SCr of 1 mg/dL).    Accu-Checks  Recent Labs     19  1641 19  2144 19  0545 19  1114 19  1655 19  2030 19  0541 19  1754 19   POCTGLUCOSE 176* 265* 220* 241* 206* 243* 207* 190* 216*       Current Medications and/or Treatments Impacting Glycemic Control  Immunotherapy:    Immunosuppressants         Stop Route Frequency     tacrolimus capsule 1.5 mg      -- Oral 2 times daily        Steroids:   Hormones (From admission, onward)    None        Pressors:    Autonomic Drugs (From admission, onward)    None        Hyperglycemia/Diabetes Medications:   Antihyperglycemics " (From admission, onward)    Start     Stop Route Frequency Ordered    02/07/19 0900  SITagliptin tablet 100 mg      -- Oral Daily 02/06/19 1514    02/06/19 2100  insulin detemir U-100 pen 24 Units      -- SubQ Nightly 02/06/19 1514    02/06/19 1613  insulin aspart U-100 pen 0-5 Units      -- SubQ Before meals & nightly PRN 02/06/19 1514

## 2019-02-07 NOTE — NURSING
Patient ready for dc home once family arrives. Awaiting Endo NP to bring glucose log.Printed AVS reviewed and blue med card has been updated by PharmD. Followup appts per AVS. Rx to be picked up from outpatient pharmacy. Patient verbalizes undertsanding of dc instructions.

## 2019-02-07 NOTE — PT/OT/SLP EVAL
Occupational Therapy   Evaluation and Discharge Note    Name: Andra Newell  MRN: [id number]  Admitting Diagnosis:  Near syncope      Recommendations:     Discharge Recommendations: (Home with family assistance)  Discharge Equipment Recommendations:  none  Barriers to discharge:  None    Assessment:     Andra Newell is a 58 y.o. female with a medical diagnosis of Near syncope. Pt presented with no functional impairments that would require skilled OT in this setting. Pt and spouse verbalized understanding to OT POC. At this time, patient is functioning at their prior level of function and does not require further acute OT services.     Plan:     During this hospitalization, patient does not require further acute OT services.  Please re-consult if situation changes.    · Plan of Care Reviewed with: patient, spouse    Subjective     Chief Complaint: Intermittent BLE pain in hip and knees   Patient/Family Comments/goals: Pt agreeable to OT POC    Occupational Profile:  Living Environment: Pt lives with spouse in a [facility] with a ramp to enter. Bathroom set up: Tub shower combination with tub bench  Previous level of function: I before transplant 1 month ago; Since then, pt required min A with tub transfers. Pt is I with mobility within the home and Mod I for mobility in the community with use of power chair  Roles and Routines: Homemaker  Equipment Used at home:  bath bench, bedside commode, walker, rolling, grab bar, power chair  Assistance upon Discharge: 24 hr assistance from spouse upon discharge.    Pain/Comfort:  · Pain Rating 1: 0/10  · Pain Rating Post-Intervention 1: 0/10    Patients cultural, spiritual, Gnosticism conflicts given the current situation:      Objective:     Communicated with: RN prior to session.  Patient found HOB elevated with (AV fistula) upon OT entry to room.    General Precautions: Standard, fall   Orthopedic Precautions:N/A   Braces: N/A     Occupational Performance:    Bed  Mobility:    · Patient completed Rolling/Turning to Left with  independence  · Patient completed Rolling/Turning to Right with independence  · Patient completed Scooting/Bridging with independence  · Patient completed Supine to Sit with modified independence  · Patient completed Sit to Supine with modified independence    Functional Mobility/Transfers:  · Patient completed Sit <> Stand Transfer from bed; commode with independence  with  no assistive device   · Patient completed Toilet Transfer Step Transfer technique with mod independence with  no AD  · Functional Mobility: Pt ambulated ~300 ft with no AD and SPV to build strength and endurance for self care tasks and functional mobility. No LOB noted. No RBs required.     Activities of Daily Living:  · Lower Body Dressing: rosalio'leslie figure 4 technique to imitate BLE dressing      Cognitive/Visual Perceptual:  Cognitive/Psychosocial Skills:     -       Oriented to: Person, Place, Time and Situation   -       Follows Commands/attention:Follows multistep  commands  -       Communication: clear/fluent  -       Memory: No Deficits noted  -       Safety awareness/insight to disability: intact   -       Mood/Affect/Coping skills/emotional control: Appropriate to situation  Visual/Perceptual:      -Intact no deficits noted    Physical Exam:  Balance:    -       Intact  Postural examination/scapula alignment:    -       No postural abnormalities identified  Skin integrity: Visible skin intact  Edema:  None noted  Sensation:    -       Intact  Upper Extremity Range of Motion:     -       Right Upper Extremity: WFL  -       Left Upper Extremity: WFL  Upper Extremity Strength:    -       Right Upper Extremity: WFL  -       Left Upper Extremity: WFL   Strength:    -       Right Upper Extremity: WFL  -       Left Upper Extremity: WFL  Fine Motor Coordination:    -       Intact    AMPAC 6 Click ADL:  AMPAC Total Score: 24    Treatment & Education:  Educated pt on the  following:  - Role of OT / OT POC  - Self care independence/ safety  - Bed mobility safety  - Functional transfer/ mobility safety  - Discontinuation of OT services  Education:    Patient left HOB elevated with all lines intact, call button in reach, RN notified and Spouse present    GOALS:   Multidisciplinary Problems     Occupational Therapy Goals     Not on file          Multidisciplinary Problems (Resolved)        Problem: Occupational Therapy Goal    Goal Priority Disciplines Outcome Interventions   Occupational Therapy Goal   (Resolved)     OT, PT/OT Outcome(s) achieved                    History:     Past Medical History:   Diagnosis Date    Anemia     Anemia in chronic kidney disease, on chronic dialysis 7/12/2017    Arthritis     Asthma     allergic airway    Colon polyp     Depression     Diabetes mellitus     Diverticulosis     Eosinophilia     ESRD (end stage renal disease)     stage V, due for living donor 9/2018    ESRD on dialysis     GERD (gastroesophageal reflux disease)     Gout     Gout     Hyperlipidemia     Hypertension     Low back pain     MRSA carrier     Obesity     Renal disorder     Rotator cuff syndrome--left     Thyroid disease     Ulnar neuropathy of right upper extremity     Uncontrolled type 2 diabetes mellitus with hyperglycemia        Past Surgical History:   Procedure Laterality Date    ACHILLES TENDON SURGERY Left May 2015    ARTHROSCOPY, HIP Left 10/3/2013    [facility]    ARTHROSCOPY-HIP WITH TROCHANTERIC BURSECTOMY Left 10/3/2013    [facility] OR    ARTHROSCOPY-SHOULDER Left 11/24/2015    [facility] OR    ARTHROSCOPY-SHOULDER WITH SUBACROMIAL DECOMPRESSION Left 7/12/2016    [facility] OR    BACK SURGERY      CHOLECYSTECTOMY      COLONOSCOPY N/A 9/6/2017    [facility] ENDO     DEBRIDEMENT-SHOULDER-ARTHROSCOPIC Left 7/12/2016    [facility] OR    DEBRIDEMENT-SHOULDER-ARTHROSCOPIC; LABRAL Left 11/24/2015    [facility] OR    DIALYSIS FISTULA CREATION  12/2017    FEMOROPLASTY, ARTHROSCOPIC Left 10/3/2013    [facility] OR    HEMORRHOID SURGERY      INJECTION-AMNIOX Left 7/12/2016    [facility] OR    JOINT REPLACEMENT      left    KNEE SURGERY      LYSIS-ADHESION Left 11/24/2015    [facility] OR    REPAIR ROTATOR CUFF ARTHROSCOPIC Left 7/12/2016    [facility] OR    REPAIR-TENDON-BICEP Left 11/24/2015    [facility] OR    SHOULDER ARTHROSCOPY      SHOULDER SURGERY      TRANSPLANT, KIDNEY N/A 1/14/2019    [facility] 2ND [facility]    UPPER GASTROINTESTINAL ENDOSCOPY  ~2013     [place]       Time Tracking:     OT Date of Treatment: 02/07/19  OT Start Time: 1057  OT Stop Time: 1110  OT Total Time (min): 13 min    Billable Minutes:Evaluation 13    Cammie Tovar, OT  2/7/2019

## 2019-02-08 LAB
PARVOVIRUS B19 ABS IGG & IGM: NORMAL
PARVOVIRUS B19 IGG ANTIBODY: NEGATIVE
PARVOVIRUS B19 IGM ANTIBODY: NEGATIVE

## 2019-02-08 NOTE — PROGRESS NOTES
Pt transferred from [place] for possible cerebellar stroke.  Workup negative to date.  Will need open MRI as outpatient.  Stent removal rescheduled as patient missed due to admission.  Parvo and CMV pending.  Will f/up with Neuro outpatient as previously scheduled.

## 2019-02-09 ENCOUNTER — TELEPHONE (OUTPATIENT)
Dept: ENDOCRINOLOGY | Facility: HOSPITAL | Age: 59
End: 2019-02-09

## 2019-02-09 LAB — CMV DNA SERPL NAA+PROBE-ACNC: NORMAL IU/ML

## 2019-02-09 RX ORDER — INSULIN ASPART 100 [IU]/ML
1-5 INJECTION, SOLUTION INTRAVENOUS; SUBCUTANEOUS
Qty: 1 BOX | Refills: 2 | Status: SHIPPED | OUTPATIENT
Start: 2019-02-09 | End: 2019-02-11 | Stop reason: SDUPTHER

## 2019-02-09 NOTE — TELEPHONE ENCOUNTER
Called patient via telephone to inquire about BG levels post admission with new DM regimen. Patient explains her BG has been elevated and in the 200's, and she has been compliant with the MDI regimen that was prescribed at time of discharge 02/07/2019.     Patient instructions:     Increase Basaglar to 28 units HS    Continue Januvia 100mg Daily    Start Novolog Low Dose SQ Insulin Correction Scale (180-230 +1 units...).      Patient verbalized understanding of regimen changes, and had no additional questions and/or concerns.

## 2019-02-11 ENCOUNTER — HOSPITAL ENCOUNTER (INPATIENT)
Facility: HOSPITAL | Age: 59
LOS: 1 days | Discharge: HOME | DRG: 812 | End: 2019-02-13
Attending: EMERGENCY MEDICINE | Admitting: TRANSPLANT SURGERY
Payer: MEDICARE

## 2019-02-11 ENCOUNTER — HOSPITAL ENCOUNTER (OUTPATIENT)
Dept: UROLOGY | Facility: HOSPITAL | Age: 59
Discharge: HOME | DRG: 812 | End: 2019-02-11
Attending: UROLOGY
Payer: MEDICARE

## 2019-02-11 ENCOUNTER — TELEPHONE (OUTPATIENT)
Dept: ENDOCRINOLOGY | Facility: CLINIC | Age: 59
End: 2019-02-11

## 2019-02-11 ENCOUNTER — TELEPHONE (OUTPATIENT)
Dept: TRANSPLANT | Facility: CLINIC | Age: 59
End: 2019-02-11

## 2019-02-11 VITALS
DIASTOLIC BLOOD PRESSURE: 61 MMHG | HEART RATE: 94 BPM | WEIGHT: 218.94 LBS | TEMPERATURE: 98 F | RESPIRATION RATE: 18 BRPM | SYSTOLIC BLOOD PRESSURE: 127 MMHG | HEIGHT: 67 IN | BODY MASS INDEX: 34.36 KG/M2

## 2019-02-11 DIAGNOSIS — R30.0 DYSURIA: Primary | ICD-10-CM

## 2019-02-11 DIAGNOSIS — E83.42 HYPOMAGNESEMIA: ICD-10-CM

## 2019-02-11 DIAGNOSIS — R30.0 DYSURIA: ICD-10-CM

## 2019-02-11 DIAGNOSIS — R07.9 CHEST PAIN: ICD-10-CM

## 2019-02-11 DIAGNOSIS — Z29.89 PROPHYLACTIC IMMUNOTHERAPY: ICD-10-CM

## 2019-02-11 DIAGNOSIS — Z94.0 HISTORY OF KIDNEY TRANSPLANT: ICD-10-CM

## 2019-02-11 DIAGNOSIS — D64.9 ACUTE ON CHRONIC ANEMIA: Primary | ICD-10-CM

## 2019-02-11 DIAGNOSIS — Z79.899 LONG TERM CURRENT USE OF IMMUNOSUPPRESSIVE DRUG: ICD-10-CM

## 2019-02-11 DIAGNOSIS — Z91.89 AT RISK FOR OPPORTUNISTIC INFECTIONS: ICD-10-CM

## 2019-02-11 DIAGNOSIS — Z94.0 STATUS POST LIVING-DONOR KIDNEY TRANSPLANTATION: ICD-10-CM

## 2019-02-11 DIAGNOSIS — K21.9 GASTROESOPHAGEAL REFLUX DISEASE WITHOUT ESOPHAGITIS: ICD-10-CM

## 2019-02-11 DIAGNOSIS — T38.0X5S ADVERSE EFFECT OF ADRENAL CORTICAL STEROIDS, SEQUELA: ICD-10-CM

## 2019-02-11 DIAGNOSIS — N30.00 ACUTE CYSTITIS WITHOUT HEMATURIA: ICD-10-CM

## 2019-02-11 DIAGNOSIS — D64.9 ANEMIA, UNSPECIFIED TYPE: ICD-10-CM

## 2019-02-11 DIAGNOSIS — R42 DIZZINESS: ICD-10-CM

## 2019-02-11 DIAGNOSIS — R06.09 EXERTIONAL DYSPNEA: ICD-10-CM

## 2019-02-11 DIAGNOSIS — Z94.0 KIDNEY TRANSPLANT STATUS: ICD-10-CM

## 2019-02-11 LAB
ABO + RH BLD: NORMAL
ALBUMIN SERPL BCP-MCNC: 3.5 G/DL
ALP SERPL-CCNC: 234 U/L
ALT SERPL W/O P-5'-P-CCNC: 27 U/L
ANION GAP SERPL CALC-SCNC: 10 MMOL/L
ANISOCYTOSIS BLD QL SMEAR: SLIGHT
AST SERPL-CCNC: 16 U/L
BACTERIA #/AREA URNS AUTO: ABNORMAL /HPF
BACTERIA BLD CULT: NORMAL
BACTERIA BLD CULT: NORMAL
BASOPHILS NFR BLD: 3 %
BILIRUB SERPL-MCNC: 0.6 MG/DL
BILIRUB UR QL STRIP: NEGATIVE
BLD GP AB SCN CELLS X3 SERPL QL: NORMAL
BLD PROD TYP BPU: NORMAL
BLOOD UNIT EXPIRATION DATE: NORMAL
BLOOD UNIT TYPE CODE: 7300
BLOOD UNIT TYPE: NORMAL
BNP SERPL-MCNC: 26 PG/ML
BUN SERPL-MCNC: 17 MG/DL
CALCIUM SERPL-MCNC: 9 MG/DL
CHLORIDE SERPL-SCNC: 106 MMOL/L
CLARITY UR REFRACT.AUTO: ABNORMAL
CO2 SERPL-SCNC: 23 MMOL/L
CODING SYSTEM: NORMAL
COLOR UR AUTO: YELLOW
CREAT SERPL-MCNC: 1.3 MG/DL
DACRYOCYTES BLD QL SMEAR: ABNORMAL
DIFFERENTIAL METHOD: ABNORMAL
DISPENSE STATUS: NORMAL
EOSINOPHIL NFR BLD: 2 %
ERYTHROCYTE [DISTWIDTH] IN BLOOD BY AUTOMATED COUNT: 13.1 %
EST. GFR  (AFRICAN AMERICAN): 51.5 ML/MIN/1.73 M^2
EST. GFR  (NON AFRICAN AMERICAN): 44.7 ML/MIN/1.73 M^2
FERRITIN SERPL-MCNC: 509 NG/ML
FERRITIN SERPL-MCNC: 517 NG/ML
GLUCOSE SERPL-MCNC: 261 MG/DL
GLUCOSE UR QL STRIP: NEGATIVE
HCT VFR BLD AUTO: 20.8 %
HGB BLD-MCNC: 6.9 G/DL
HGB UR QL STRIP: NEGATIVE
HYALINE CASTS UR QL AUTO: 1 /LPF
HYPOCHROMIA BLD QL SMEAR: ABNORMAL
IMM GRANULOCYTES # BLD AUTO: ABNORMAL K/UL
IMM GRANULOCYTES NFR BLD AUTO: ABNORMAL %
IRON SERPL-MCNC: 52 UG/DL
KETONES UR QL STRIP: NEGATIVE
LACTATE SERPL-SCNC: 1.7 MMOL/L
LDH SERPL L TO P-CCNC: 238 U/L
LEUKOCYTE ESTERASE UR QL STRIP: ABNORMAL
LIPASE SERPL-CCNC: 52 U/L
LYMPHOCYTES NFR BLD: 3 %
MAGNESIUM SERPL-MCNC: 1.4 MG/DL
MCH RBC QN AUTO: 30.9 PG
MCHC RBC AUTO-ENTMCNC: 33.2 G/DL
MCV RBC AUTO: 93 FL
MICROSCOPIC COMMENT: ABNORMAL
MONOCYTES NFR BLD: 2 %
NEUTROPHILS NFR BLD: 90 %
NITRITE UR QL STRIP: POSITIVE
NRBC BLD-RTO: 0 /100 WBC
OVALOCYTES BLD QL SMEAR: ABNORMAL
PH UR STRIP: 5 [PH] (ref 5–8)
PLATELET # BLD AUTO: 268 K/UL
PLATELET BLD QL SMEAR: ABNORMAL
PMV BLD AUTO: 10.2 FL
POCT GLUCOSE: 207 MG/DL (ref 70–110)
POCT GLUCOSE: 256 MG/DL (ref 70–110)
POIKILOCYTOSIS BLD QL SMEAR: SLIGHT
POLYCHROMASIA BLD QL SMEAR: ABNORMAL
POTASSIUM SERPL-SCNC: 4.5 MMOL/L
PROT SERPL-MCNC: 6.7 G/DL
PROT UR QL STRIP: NEGATIVE
RBC # BLD AUTO: 2.23 M/UL
RBC #/AREA URNS AUTO: 3 /HPF (ref 0–4)
RETICS/RBC NFR AUTO: 0.4 %
SATURATED IRON: 19 %
SODIUM SERPL-SCNC: 139 MMOL/L
SP GR UR STRIP: 1.01 (ref 1–1.03)
SQUAMOUS #/AREA URNS AUTO: 1 /HPF
TOTAL IRON BINDING CAPACITY: 275 UG/DL
TRANS ERYTHROCYTES VOL PATIENT: NORMAL ML
TRANSFERRIN SERPL-MCNC: 186 MG/DL
TROPONIN I SERPL DL<=0.01 NG/ML-MCNC: <0.006 NG/ML
TROPONIN I SERPL DL<=0.01 NG/ML-MCNC: <0.006 NG/ML
URN SPEC COLLECT METH UR: ABNORMAL
WBC # BLD AUTO: 10.16 K/UL
WBC #/AREA URNS AUTO: 19 /HPF (ref 0–5)

## 2019-02-11 PROCEDURE — 87186 SC STD MICRODIL/AGAR DIL: CPT | Mod: 59

## 2019-02-11 PROCEDURE — 83880 ASSAY OF NATRIURETIC PEPTIDE: CPT

## 2019-02-11 PROCEDURE — 83735 ASSAY OF MAGNESIUM: CPT

## 2019-02-11 PROCEDURE — 27201040 HC RC 50 FILTER

## 2019-02-11 PROCEDURE — 87088 URINE BACTERIA CULTURE: CPT | Mod: 59

## 2019-02-11 PROCEDURE — 87040 BLOOD CULTURE FOR BACTERIA: CPT

## 2019-02-11 PROCEDURE — G0378 HOSPITAL OBSERVATION PER HR: HCPCS

## 2019-02-11 PROCEDURE — 83540 ASSAY OF IRON: CPT

## 2019-02-11 PROCEDURE — 25000003 PHARM REV CODE 250: Performed by: PHYSICIAN ASSISTANT

## 2019-02-11 PROCEDURE — 83605 ASSAY OF LACTIC ACID: CPT

## 2019-02-11 PROCEDURE — S5571 INSULIN DISPOS PEN 3 ML: HCPCS | Performed by: PHYSICIAN ASSISTANT

## 2019-02-11 PROCEDURE — 93010 ELECTROCARDIOGRAM REPORT: CPT | Mod: ,,, | Performed by: INTERNAL MEDICINE

## 2019-02-11 PROCEDURE — 83615 LACTATE (LD) (LDH) ENZYME: CPT

## 2019-02-11 PROCEDURE — 99499 UNLISTED E&M SERVICE: CPT | Mod: ,,, | Performed by: UROLOGY

## 2019-02-11 PROCEDURE — 85007 BL SMEAR W/DIFF WBC COUNT: CPT

## 2019-02-11 PROCEDURE — 87077 CULTURE AEROBIC IDENTIFY: CPT | Mod: 59

## 2019-02-11 PROCEDURE — 80053 COMPREHEN METABOLIC PANEL: CPT

## 2019-02-11 PROCEDURE — 86850 RBC ANTIBODY SCREEN: CPT

## 2019-02-11 PROCEDURE — 99285 EMERGENCY DEPT VISIT HI MDM: CPT | Mod: ,,, | Performed by: EMERGENCY MEDICINE

## 2019-02-11 PROCEDURE — 36430 TRANSFUSION BLD/BLD COMPNT: CPT

## 2019-02-11 PROCEDURE — 87086 URINE CULTURE/COLONY COUNT: CPT | Mod: 59

## 2019-02-11 PROCEDURE — 99285 EMERGENCY DEPT VISIT HI MDM: CPT | Mod: 25

## 2019-02-11 PROCEDURE — 87186 SC STD MICRODIL/AGAR DIL: CPT

## 2019-02-11 PROCEDURE — 87077 CULTURE AEROBIC IDENTIFY: CPT

## 2019-02-11 PROCEDURE — 82962 GLUCOSE BLOOD TEST: CPT

## 2019-02-11 PROCEDURE — 85027 COMPLETE CBC AUTOMATED: CPT

## 2019-02-11 PROCEDURE — 63600175 PHARM REV CODE 636 W HCPCS: Performed by: PHYSICIAN ASSISTANT

## 2019-02-11 PROCEDURE — 82728 ASSAY OF FERRITIN: CPT

## 2019-02-11 PROCEDURE — 87086 URINE CULTURE/COLONY COUNT: CPT

## 2019-02-11 PROCEDURE — 99499 NO LOS: ICD-10-PCS | Mod: ,,, | Performed by: UROLOGY

## 2019-02-11 PROCEDURE — 81001 URINALYSIS AUTO W/SCOPE: CPT

## 2019-02-11 PROCEDURE — 93010 EKG 12-LEAD: ICD-10-PCS | Mod: ,,, | Performed by: INTERNAL MEDICINE

## 2019-02-11 PROCEDURE — 93005 ELECTROCARDIOGRAM TRACING: CPT

## 2019-02-11 PROCEDURE — 96372 THER/PROPH/DIAG INJ SC/IM: CPT | Mod: 59

## 2019-02-11 PROCEDURE — 84484 ASSAY OF TROPONIN QUANT: CPT

## 2019-02-11 PROCEDURE — 86920 COMPATIBILITY TEST SPIN: CPT

## 2019-02-11 PROCEDURE — 82272 OCCULT BLD FECES 1-3 TESTS: CPT

## 2019-02-11 PROCEDURE — 99285 PR EMERGENCY DEPT VISIT,LEVEL V: ICD-10-PCS | Mod: ,,, | Performed by: EMERGENCY MEDICINE

## 2019-02-11 PROCEDURE — 82728 ASSAY OF FERRITIN: CPT | Mod: 91

## 2019-02-11 PROCEDURE — P9021 RED BLOOD CELLS UNIT: HCPCS

## 2019-02-11 PROCEDURE — 96374 THER/PROPH/DIAG INJ IV PUSH: CPT

## 2019-02-11 PROCEDURE — 87088 URINE BACTERIA CULTURE: CPT

## 2019-02-11 PROCEDURE — 83690 ASSAY OF LIPASE: CPT

## 2019-02-11 PROCEDURE — 85045 AUTOMATED RETICULOCYTE COUNT: CPT

## 2019-02-11 PROCEDURE — 84484 ASSAY OF TROPONIN QUANT: CPT | Mod: 91

## 2019-02-11 RX ORDER — LORAZEPAM 1 MG/1
1 TABLET ORAL 2 TIMES DAILY
Status: DISCONTINUED | OUTPATIENT
Start: 2019-02-11 | End: 2019-02-13 | Stop reason: HOSPADM

## 2019-02-11 RX ORDER — PANTOPRAZOLE SODIUM 40 MG/1
40 TABLET, DELAYED RELEASE ORAL DAILY
Status: DISCONTINUED | OUTPATIENT
Start: 2019-02-11 | End: 2019-02-13 | Stop reason: HOSPADM

## 2019-02-11 RX ORDER — INSULIN ASPART 100 [IU]/ML
0-5 INJECTION, SOLUTION INTRAVENOUS; SUBCUTANEOUS
Status: DISCONTINUED | OUTPATIENT
Start: 2019-02-11 | End: 2019-02-13 | Stop reason: HOSPADM

## 2019-02-11 RX ORDER — LANOLIN ALCOHOL/MO/W.PET/CERES
800 CREAM (GRAM) TOPICAL DAILY
Status: DISCONTINUED | OUTPATIENT
Start: 2019-02-12 | End: 2019-02-13 | Stop reason: HOSPADM

## 2019-02-11 RX ORDER — CEFTRIAXONE 1 G/1
1 INJECTION, POWDER, FOR SOLUTION INTRAMUSCULAR; INTRAVENOUS
Status: DISCONTINUED | OUTPATIENT
Start: 2019-02-11 | End: 2019-02-12

## 2019-02-11 RX ORDER — LIDOCAINE HYDROCHLORIDE 10 MG/ML
1 INJECTION, SOLUTION EPIDURAL; INFILTRATION; INTRACAUDAL; PERINEURAL ONCE
Status: DISCONTINUED | OUTPATIENT
Start: 2019-02-11 | End: 2019-02-13 | Stop reason: HOSPADM

## 2019-02-11 RX ORDER — LIDOCAINE HYDROCHLORIDE 20 MG/ML
JELLY TOPICAL ONCE
Status: DISCONTINUED | OUTPATIENT
Start: 2019-02-11 | End: 2019-02-13 | Stop reason: HOSPADM

## 2019-02-11 RX ORDER — INSULIN ASPART 100 [IU]/ML
1-5 INJECTION, SOLUTION INTRAVENOUS; SUBCUTANEOUS
Qty: 15 ML | Refills: 2 | Status: SHIPPED | OUTPATIENT
Start: 2019-02-11 | End: 2019-03-06

## 2019-02-11 RX ORDER — ONDANSETRON 8 MG/1
8 TABLET, ORALLY DISINTEGRATING ORAL EVERY 8 HOURS PRN
Status: DISCONTINUED | OUTPATIENT
Start: 2019-02-11 | End: 2019-02-13 | Stop reason: HOSPADM

## 2019-02-11 RX ORDER — IBUPROFEN 200 MG
16 TABLET ORAL
Status: DISCONTINUED | OUTPATIENT
Start: 2019-02-11 | End: 2019-02-13 | Stop reason: HOSPADM

## 2019-02-11 RX ORDER — GLUCAGON 1 MG
1 KIT INJECTION
Status: DISCONTINUED | OUTPATIENT
Start: 2019-02-11 | End: 2019-02-13 | Stop reason: HOSPADM

## 2019-02-11 RX ORDER — ATORVASTATIN CALCIUM 20 MG/1
40 TABLET, FILM COATED ORAL DAILY
Status: DISCONTINUED | OUTPATIENT
Start: 2019-02-12 | End: 2019-02-13 | Stop reason: HOSPADM

## 2019-02-11 RX ORDER — IBUPROFEN 200 MG
24 TABLET ORAL
Status: DISCONTINUED | OUTPATIENT
Start: 2019-02-11 | End: 2019-02-13 | Stop reason: HOSPADM

## 2019-02-11 RX ORDER — VILAZODONE HYDROCHLORIDE 10 MG/1
40 TABLET ORAL DAILY
Status: DISCONTINUED | OUTPATIENT
Start: 2019-02-12 | End: 2019-02-13 | Stop reason: HOSPADM

## 2019-02-11 RX ORDER — ACETAMINOPHEN 325 MG/1
650 TABLET ORAL EVERY 8 HOURS PRN
Status: DISCONTINUED | OUTPATIENT
Start: 2019-02-11 | End: 2019-02-13 | Stop reason: HOSPADM

## 2019-02-11 RX ORDER — LEVOTHYROXINE SODIUM 75 UG/1
75 TABLET ORAL DAILY
Status: DISCONTINUED | OUTPATIENT
Start: 2019-02-12 | End: 2019-02-13 | Stop reason: HOSPADM

## 2019-02-11 RX ORDER — ATOVAQUONE 750 MG/5ML
1500 SUSPENSION ORAL DAILY
Status: DISCONTINUED | OUTPATIENT
Start: 2019-02-12 | End: 2019-02-13 | Stop reason: HOSPADM

## 2019-02-11 RX ORDER — SODIUM CHLORIDE 0.9 % (FLUSH) 0.9 %
3 SYRINGE (ML) INJECTION
Status: DISCONTINUED | OUTPATIENT
Start: 2019-02-11 | End: 2019-02-13 | Stop reason: HOSPADM

## 2019-02-11 RX ORDER — HYDROCODONE BITARTRATE AND ACETAMINOPHEN 500; 5 MG/1; MG/1
TABLET ORAL
Status: DISCONTINUED | OUTPATIENT
Start: 2019-02-11 | End: 2019-02-13 | Stop reason: HOSPADM

## 2019-02-11 RX ORDER — SODIUM CHLORIDE 9 MG/ML
500 INJECTION, SOLUTION INTRAVENOUS
Status: DISCONTINUED | OUTPATIENT
Start: 2019-02-11 | End: 2019-02-11

## 2019-02-11 RX ORDER — CIPROFLOXACIN 500 MG/1
500 TABLET ORAL ONCE
Status: DISCONTINUED | OUTPATIENT
Start: 2019-02-11 | End: 2019-02-13 | Stop reason: HOSPADM

## 2019-02-11 RX ORDER — HEPARIN SODIUM 5000 [USP'U]/ML
5000 INJECTION, SOLUTION INTRAVENOUS; SUBCUTANEOUS EVERY 8 HOURS
Status: DISCONTINUED | OUTPATIENT
Start: 2019-02-11 | End: 2019-02-13 | Stop reason: HOSPADM

## 2019-02-11 RX ADMIN — HEPARIN SODIUM 5000 UNITS: 5000 INJECTION, SOLUTION INTRAVENOUS; SUBCUTANEOUS at 10:02

## 2019-02-11 RX ADMIN — LORAZEPAM 1 MG: 1 TABLET ORAL at 09:02

## 2019-02-11 RX ADMIN — PANTOPRAZOLE SODIUM 40 MG: 40 TABLET, DELAYED RELEASE ORAL at 06:02

## 2019-02-11 RX ADMIN — TACROLIMUS 1.5 MG: 1 CAPSULE ORAL at 06:02

## 2019-02-11 RX ADMIN — INSULIN DETEMIR 14 UNITS: 100 INJECTION, SOLUTION SUBCUTANEOUS at 09:02

## 2019-02-11 RX ADMIN — CEFTRIAXONE SODIUM 1 G: 1 INJECTION, POWDER, FOR SOLUTION INTRAMUSCULAR; INTRAVENOUS at 10:02

## 2019-02-11 NOTE — TELEPHONE ENCOUNTER
Received phone call from patient C/O acute SOB and weakness .  Patient to present to the ER now all appropriate personnel notified.

## 2019-02-11 NOTE — PROVIDER PROGRESS NOTES - EMERGENCY DEPT.
Encounter Date: 2/11/2019    ED Physician Progress Notes         EKG - STEMI Decision  Initial Reading: No STEMI present.    I, Marlon Pepe, am scribing for, and in the presence of, Dr. Hliliard. I performed the above scribed service and the documentation accurately describes the services I performed. I attest to the accuracy of the note.

## 2019-02-11 NOTE — PROGRESS NOTES
Called and discuss MRI plan of care with pt / review meds NPO status and need for adult  / pt to call MRI if questions arise / pt understanding and all questions answered

## 2019-02-11 NOTE — ASSESSMENT & PLAN NOTE
- Resumed long acting insulin at half diose.  Holding Januvia.  Low mod correction insulin ordered.  Will consult Endo.

## 2019-02-11 NOTE — SUBJECTIVE & OBJECTIVE
Scheduled Meds:  Continuous Infusions:  PRN Meds:    Review of Systems   Constitutional: Positive for activity change (decreased) and fatigue (worsening). Negative for appetite change, chills, fever and unexpected weight change.   HENT: Negative for postnasal drip.    Eyes: Negative for visual disturbance.   Respiratory: Positive for shortness of breath (worse w exertion). Negative for cough and wheezing.    Cardiovascular: Negative for chest pain, palpitations and leg swelling.   Gastrointestinal: Negative for abdominal distention, abdominal pain, anal bleeding, blood in stool, constipation, diarrhea, nausea, rectal pain and vomiting.   Genitourinary: Negative for decreased urine volume, difficulty urinating, dysuria and hematuria.   Musculoskeletal: Negative for arthralgias and gait problem.   Skin: Positive for wound (RLQ surgical). Negative for rash.   Allergic/Immunologic: Positive for immunocompromised state.   Neurological: Positive for weakness. Negative for dizziness, light-headedness and headaches.   Psychiatric/Behavioral: The patient is nervous/anxious.      Objective:     Vital Signs (Most Recent):  Temp: 98.4 °F (36.9 °C) (02/11/19 1557)  Pulse: 96 (02/11/19 1557)  Resp: 19 (02/11/19 1557)  BP: (!) 140/66 (02/11/19 1557)  SpO2: 96 % (02/11/19 1557) Vital Signs (24h Range):  Temp:  [98.2 °F (36.8 °C)-99.1 °F (37.3 °C)] 98.4 °F (36.9 °C)  Pulse:  [] 96  Resp:  [18-19] 19  SpO2:  [96 %-97 %] 96 %  BP: (116-140)/(61-66) 140/66     Weight: 98.9 kg (218 lb)  Body mass index is 34.14 kg/m².    Intake/Output - Last 3 Shifts     None          Physical Exam   Constitutional: She is oriented to person, place, and time. She appears well-developed.   HENT:   Head: Normocephalic and atraumatic.   Eyes: Pupils are equal, round, and reactive to light. No scleral icterus.   Neck: Normal range of motion. Neck supple.   Cardiovascular: Normal rate and regular rhythm.   Murmur heard.  Pulmonary/Chest: Effort normal  and breath sounds normal. No respiratory distress.   Abdominal: Soft. Bowel sounds are normal. She exhibits no distension. There is no tenderness.   Musculoskeletal: She exhibits no edema.   Neurological: She is alert and oriented to person, place, and time.   Skin: Skin is warm and dry. Capillary refill takes 2 to 3 seconds. She is not diaphoretic.   RLQ surgical incision CDI w steri strips   Psychiatric: She has a normal mood and affect. Her behavior is normal. Judgment and thought content normal.       Laboratory:  Immunosuppressants     None        CBC:   Recent Labs   Lab 02/07/19  1237   WBC 2.25*   RBC 2.58*   HGB 7.8*   HCT 24.3*      MCV 94   MCH 30.2   MCHC 32.1     CMP:   Recent Labs   Lab 02/06/19  0452 02/07/19  0643   * 193*   CALCIUM 8.9 8.8   ALBUMIN 3.3* 3.2*   PROT 6.3  --     140   K 4.5 4.5   CO2 22* 26    108   BUN 20 18   CREATININE 1.2 1.0   ALKPHOS 227*  --    ALT 30  --    AST 20  --    BILITOT 0.4  --      Labs within the past 24 hours have been reviewed.    Diagnostic Results:  I have personally reviewed all pertinent imaging studies.

## 2019-02-11 NOTE — HPI
"Ms. Andra Newell is a 38 y/o F with hx of ESRD 2/2 DM II  now s/p LURT 1/14/19 (Campath induction, CMV D+/R-, TIT 58 min). Immunosuppression per Campath induction protocol. She is a CMV study participant. Surgery was uncomplicated. She was due to have ureteral stent removed 2/11 in Urology clinic. However, UA was obtained and noted with possible UTI. Therefore, procedure was cancelled.  Urine cx is pending. She then presented to the ED on 2/11with complaints of SOB, dyspnea on exertion, chest pain, weakness x 1 day. She was recently admitted and discharged from the hospital on 2/7/19 with a similar complaint of dizziness. MRI was attempted at that time. However, unable to be obtained inpatient as patient needs to be sedated for testing, which cannot be done inpatient. She was scheduled for MRI brain as an outpatient on 2/12. ED work up negative for acute cardiac event. Chest x-ray with no acute process. Lab work in ED notable for acute drop in H/H. Patient with no hematochezia, melena. Stool in ED negative for guaiac. Kidney US obtained which did show 2 fluid collections. Abdomen soft, non tender. She was placed on antibiotic for possible UTI, repeat UA and cx obtained. She was transfused 1 unit of PRBC on admit. H/H only increased to 7.2/23 from 6/20 with 1 unit of PRBC. This AM, she reports that she is still experiencing weakness, feeling "off balanced" especially when going from seated position to a standing position. SOB also still present with movement. Plan to obtain CT A/P without contrast to rule out IA bleeding as cause of ongoing anemia. Patient is also part of CMV research study. Discussed possible side effects from research drug with the research team. While, the team reports that anemia not likely from medications and that they have not had similar symptoms reported in other patients, they are in agreement that we will hold medications for 3-4 drugs and assess for improvement in admission " symptoms/anemia. Given SOB, plan to obtain US of BLE to rule out DVT along with VQ scan.

## 2019-02-11 NOTE — ASSESSMENT & PLAN NOTE
- Pt w hx of chronic anemia.  She presented to ER w dyspnea and chest heaviness.  Hgb 6.9.  Last Hgb on 2/7 was 7.3.  Pt is s/p kidney transplant.  Will collect stool for occult blood.  Kidney US to r/o fluid collection/bleed.  Will type and screen pt and transfuse 1u PRBC.  Serial h/h ordered.  Iron studies ordered.  - pt also w hx of GERD.  Was on Pepcid, will change to Protonix.

## 2019-02-11 NOTE — TELEPHONE ENCOUNTER
Tired to call pt I explained to her that we only have np for tomorrow at 730am. Until July pt said she will call us back.

## 2019-02-11 NOTE — ED PROVIDER NOTES
Encounter Date: 2/11/2019    SCRIBE #1 NOTE: I, Bren Garrison, am scribing for, and in the presence of, Dr. Cartwright. Other sections scribed: HPI, ROS, PE.       History     Chief Complaint   Patient presents with    Dizziness     since last night off balance , feeling weak    Fatigue     58 y.o. female with a previous medical history including ESRD, s/p kidney transplant, HTN, HLD, and obesity presents to the ED for weakness, chest pain, and dyspnea on exertion. Associated symptoms include nausea, unstable gait, fatigue, back pain, palpitations and dysuria. She denies any fever, vomiting, diarrhea, or abdominal pain. Patient describes a heaviness on her chest when she lays down and being unable to get comfortable last night due to palpitations. Earlier today, she also felt nauseous and like she was going to pass out. Patient underwent a kidney transplant 4 weeks ago and reports that she has not felt like this since the surgery. She also reports planning to have a stent removed today when she was diagnosed with a UTI in clinic and prescribed antibiotics. Patient denies any history of CHF. A ten point review of systems was completed and is negative except as documented above. Patient denies any other acute medical complaint. The patients available PMH, PSH, medications, allergies, and triage vital signs were reviewed just prior to their medical evaluation.      The history is provided by the patient and medical records.     Review of patient's allergies indicates:   Allergen Reactions    Torsemide Diarrhea     Diarrhea stopped once discontinued med    Codeine Itching     Can take oxycodone and hydrocodone with Benadryl     Past Medical History:   Diagnosis Date    Anemia     Anemia in chronic kidney disease, on chronic dialysis 7/12/2017    Arthritis     Asthma     allergic airway    Colon polyp     Depression     Diabetes mellitus     Diverticulosis     Eosinophilia     ESRD (end stage renal  disease)     stage V, due for living donor 9/2018    ESRD on dialysis     GERD (gastroesophageal reflux disease)     Gout     Gout     Hyperlipidemia     Hypertension     Low back pain     MRSA carrier     Obesity     Renal disorder     Rotator cuff syndrome--left     Thyroid disease     Ulnar neuropathy of right upper extremity     Uncontrolled type 2 diabetes mellitus with hyperglycemia      Past Surgical History:   Procedure Laterality Date    ACHILLES TENDON SURGERY Left May 2015    ARTHROSCOPY, HIP Left 10/3/2013    [facility]    ARTHROSCOPY-HIP WITH TROCHANTERIC BURSECTOMY Left 10/3/2013    [facility]    ARTHROSCOPY-SHOULDER Left 11/24/2015    [facility]    ARTHROSCOPY-SHOULDER WITH SUBACROMIAL DECOMPRESSION Left 7/12/2016    [facility]    BACK SURGERY      CHOLECYSTECTOMY      COLONOSCOPY N/A 9/6/2017    [facility]    DEBRIDEMENT-SHOULDER-ARTHROSCOPIC Left 7/12/2016    [facility]    DEBRIDEMENT-SHOULDER-ARTHROSCOPIC; LABRAL Left 11/24/2015    [facility]    DIALYSIS FISTULA CREATION  12/2017    FEMOROPLASTY, ARTHROSCOPIC Left 10/3/2013    [facility]    HEMORRHOID SURGERY      INJECTION-AMNIOX Left 7/12/2016    [facility]    JOINT REPLACEMENT      left    KNEE SURGERY      LYSIS-ADHESION Left 11/24/2015    [facility]    REPAIR ROTATOR CUFF ARTHROSCOPIC Left 7/12/2016    [facility]    REPAIR-TENDON-BICEP Left 11/24/2015    [facility]    SHOULDER ARTHROSCOPY      SHOULDER SURGERY      TRANSPLANT, KIDNEY N/A 1/14/2019    [facility]    UPPER GASTROINTESTINAL  ENDOSCOPY  ~2013    Dr. Moralez     Family History   Problem Relation Age of Onset    Diabetes Mother     Alzheimer's disease Mother     Thyroid disease Mother     Hyperlipidemia Mother     Heart disease Father     Stroke Father     Diabetes Sister     Lupus Sister     Ovarian cancer Sister     Heart disease Brother     No Known Problems Son     Diabetes Maternal Grandmother     Hypertension Maternal Grandmother     No Known Problems Paternal Grandmother     No Known Problems Paternal Grandfather     COPD Maternal Aunt     Diabetes Maternal Aunt     Heart disease Maternal Aunt     Hypertension Maternal Aunt     No Known Problems Maternal Uncle     No Known Problems Paternal Aunt     No Known Problems Paternal Uncle     Colon cancer Neg Hx     Colon polyps Neg Hx     Crohn's disease Neg Hx     Ulcerative colitis Neg Hx     Celiac disease Neg Hx      Social History     Tobacco Use    Smoking status: Never Smoker    Smokeless tobacco: Never Used   Substance Use Topics    Alcohol use: No    Drug use: No     Review of Systems   Constitutional: Positive for fatigue. Negative for fever.   HENT: Negative for sore throat.    Eyes: Negative for visual disturbance.   Respiratory: Positive for shortness of breath (BOYER). Negative for cough.    Cardiovascular: Positive for chest pain (heaviness in chest).   Gastrointestinal: Positive for nausea. Negative for abdominal pain, diarrhea and vomiting.   Genitourinary: Positive for dysuria.   Musculoskeletal: Positive for back pain. Gait problem: unstable.   Skin: Negative for rash.   Allergic/Immunologic: Positive for immunocompromised state.   Neurological: Negative for syncope.   All other systems reviewed and are negative.      Physical Exam     Initial Vitals [02/11/19 1429]   BP Pulse Resp Temp SpO2   116/63 102 18 99.1 °F (37.3 °C) 97 %      MAP       --         Physical Exam    Nursing note and vitals reviewed.  Constitutional: She appears  well-developed and well-nourished. She is not diaphoretic. No distress.   HENT:   Head: Normocephalic and atraumatic.   Nose: Nose normal.   Eyes: Conjunctivae are normal. Right eye exhibits no discharge. Left eye exhibits no discharge.   Neck: Normal range of motion. Neck supple.   Cardiovascular: Normal rate, regular rhythm, normal heart sounds and intact distal pulses. Exam reveals no gallop and no friction rub.    No murmur heard.  Pulmonary/Chest: Breath sounds normal. No respiratory distress. She has no wheezes. She has no rhonchi. She has no rales.   Abdominal: Soft. She exhibits no distension. There is no tenderness. There is no rebound and no guarding.   Genitourinary: Rectal exam shows guaiac negative stool. Guaiac negative stool. : Acceptable.  Genitourinary Comments: Rectal exam normal   Musculoskeletal: Normal range of motion. She exhibits no edema or tenderness.   Neurological: She is alert and oriented to person, place, and time. She has normal strength. GCS score is 15. GCS eye subscore is 4. GCS verbal subscore is 5. GCS motor subscore is 6.   Skin: Skin is warm and dry. No rash noted. No erythema.   Post-op site to the RLQ that is clean and dry.   Psychiatric: She has a normal mood and affect. Her behavior is normal. Judgment and thought content normal.         ED Course   Procedures  Labs Reviewed   CBC W/ AUTO DIFFERENTIAL - Abnormal; Notable for the following components:       Result Value    RBC 2.23 (*)     Hemoglobin 6.9 (*)     Hematocrit 20.8 (*)     Gran% 90.0 (*)     Lymph% 3.0 (*)     Mono% 2.0 (*)     Basophil% 3.0 (*)     All other components within normal limits   COMPREHENSIVE METABOLIC PANEL - Abnormal; Notable for the following components:    Glucose 261 (*)     Alkaline Phosphatase 234 (*)     eGFR if  51.5 (*)     eGFR if non  44.7 (*)     All other components within normal limits   MAGNESIUM - Abnormal; Notable for the following  components:    Magnesium 1.4 (*)     All other components within normal limits   URINALYSIS, REFLEX TO URINE CULTURE - Abnormal; Notable for the following components:    Appearance, UA Hazy (*)     Nitrite, UA Positive (*)     Leukocytes, UA 2+ (*)     All other components within normal limits    Narrative:     Preferred Collection Type->Urine, Clean Catch   FERRITIN - Abnormal; Notable for the following components:    Ferritin 509 (*)     All other components within normal limits   RETICULOCYTES - Abnormal; Notable for the following components:    Retic 0.4 (*)     All other components within normal limits   IRON AND TIBC - Abnormal; Notable for the following components:    Transferrin 186 (*)     Saturated Iron 19 (*)     All other components within normal limits    Narrative:     ADD-ON ORDER IRONP #[id number], PATRICIA #[id number]; PER CANDY PAL PA-C 02/11/2019  18:12    FERRITIN - Abnormal; Notable for the following components:    Ferritin 517 (*)     All other components within normal limits    Narrative:     ADD-ON ORDER IRONP #[id number], PATRICIA #[id number]; PER CANDY PAL PA-C 02/11/2019  18:12    URINALYSIS MICROSCOPIC - Abnormal; Notable for the following components:    WBC, UA 19 (*)     Bacteria, UA Few (*)     All other components within normal limits    Narrative:     Preferred Collection Type->Urine, Clean Catch   POCT GLUCOSE - Abnormal; Notable for the following components:    POCT Glucose 256 (*)     All other components within normal limits   CULTURE, BLOOD   CULTURE, BLOOD   CULTURE, URINE   B-TYPE NATRIURETIC PEPTIDE   LACTIC ACID, PLASMA   LIPASE   TROPONIN I   IRON AND TIBC   FERRITIN   LACTATE DEHYDROGENASE   OCCULT BLOOD X 1, STOOL   TROPONIN I   TYPE & SCREEN   POCT GLUCOSE MONITORING CONTINUOUS   PREPARE RBC SOFT     EKG Readings: (Independently Interpreted)   Initial Reading: No STEMI. Rhythm: Normal Sinus Rhythm. Heart Rate: 90. Ectopy: No Ectopy. Conduction: Normal. ST Segments:  Normal ST Segments. T Waves: Normal.       Imaging Results          US Transplant Kidney With Doppler (In process)  Result time 02/11/19 20:11:06               X-Ray Chest PA And Lateral (Final result)  Result time 02/11/19 17:20:40    Final result by El Garg MD (02/11/19 17:20:40)                 Impression:      No acute process.      Electronically signed by: El Garg MD  Date:    02/11/2019  Time:    17:20             Narrative:    EXAMINATION:  XR CHEST PA AND LATERAL    CLINICAL HISTORY:  chest pain;    TECHNIQUE:  PA and lateral views of the chest were performed.    COMPARISON:  01/14/2019.    FINDINGS:  There has been interval removal of the right-sided internal jugular access catheter.  Monitoring EKG leads are present.    The trachea is unremarkable.  The cardiomediastinal silhouette is within normal limits.  The hemidiaphragms are unremarkable.  There is no evidence of free air beneath the hemidiaphragms.  There are no pleural effusions there is no evidence of a pneumothorax.  There is no evidence of pneumomediastinum.  No airspace opacities are present.  There are degenerative changes in the osseous structures.  The subcutaneous tissues are unremarkable                                 Medical Decision Making:   History:   Old Medical Records: I decided to obtain old medical records.  Old Records Summarized: records from clinic visits.  Clinical Tests:   Lab Tests: Ordered and Reviewed  Radiological Study: Ordered and Reviewed  Medical Tests: Ordered and Reviewed  ED Management:  59 yo F s/p kidney transplant presents with global fatigue, cp, sob, and nausea.  Vitals with slight tachycardia.  PE benign.  Labs with anemia.  Guiac negative.  Seen by transplant who will assume care and transfuse.  No ECG findings of acute ischemia.  CXR unremarkable.  Fluids held at requests of transplant.  Some labs including UA pending at turnover.  Transplant will f/up.  Cultures ordered.  Did bedside teaching.   All questions answered.  Patient acknowledges understanding.  Other:   I have discussed this case with another health care provider.       <> Summary of the Discussion: transplant  transplant            Scribe Attestation:   Scribe #1: I performed the above scribed service and the documentation accurately describes the services I performed. I attest to the accuracy of the note.               Clinical Impression:   The primary encounter diagnosis was Anemia, unspecified type. Diagnoses of Dizziness, Chest pain, and History of kidney transplant were also pertinent to this visit.      Disposition:   Disposition: Admitted  Condition: Stable                        Aries Cartwright MD  02/11/19 2019

## 2019-02-11 NOTE — ASSESSMENT & PLAN NOTE
- s/p living kidney transplant 1/14/19 for ESRD 2/2 secondary DM, Type II and HTN, Campath induction.  Cr trended down nicely. Surgery uncomplicated. Patient progressed well post-operatively. Her bull was removed on POD #3 without difficulty. She had no drains.   - scheduled to have stent removed today.  UTI noted in Urology clinic.  Pt also c/o chest heaviness and dyspnea.  Of note, she was kept inpatient an additional day due to CP/indigestion (EKG NSR, troponin wnl, relieved with increase in pepsid frequency + maalox) post transplant.  Pt was also admitted to [facility] for concern of cerebellar stroke 2/7. At that time, pt reported similar symptoms- waking feeling dizzy and especially fatigued.   MRI was postponed given she needs sedation which cannot be done in patient.  She is scheduled for MRI brain outpt 2/13/19.    - pt reports symptoms started yesterday evening and persisted today.  Blood glucose 200's.  She reports tolerating meals.  She notes mild nausea and again endorses GERD.      - cr 1.  She denies change in UOP.    - of note h/h decreased, kidney US ordered.

## 2019-02-11 NOTE — ASSESSMENT & PLAN NOTE
- worse over past 18 hours.  EKG unremarkable for STEMI.  Serial troponin's ordered.    - CXR ordered.

## 2019-02-11 NOTE — HPI
Ms. Andra Newell is a 38 y/o F with hx of ESRD 2/2 DM II  now s/p LURT 1/14/19 (Campath induction, CMV D+/R-, TIT 58 min). Immunosuppression per Campath induction protocol. She is a CMV study participant. Surgery was uncomplicated. She was due to have ureteral stent removed today in Urology clinic. However, UA was obtained and noted with possible UTI. Therefore, procedure was cancelled.  Urine cx is pending. She now presents to the ED with complaints of SOB, dyspnea on exertion, chest pain, weakness x 1 day. She was recently admitted and discharged from the hospital on 2/7/19 with complaints of dizziness. MRI was attempted at that time. However, unable to be obtained inpatient as patient needs to be sedated for testing, which cannot be done inpatient. She was scheduled for MRI brain as an outpatient. ED work up negative for acute cardiac event. Lab work

## 2019-02-11 NOTE — ASSESSMENT & PLAN NOTE
- worse over past 18 hours.  EKG unremarkable for STEMI.  Serial troponin's ordered.  - CXR ordered

## 2019-02-11 NOTE — SUBJECTIVE & OBJECTIVE
Scheduled Meds:   [START ON 2/12/2019] atorvastatin  40 mg Oral Daily    [START ON 2/12/2019] atovaquone  1,500 mg Oral Daily    cefTRIAXone (ROCEPHIN) IVPB  1 g Intravenous Q24H    heparin (porcine)  5,000 Units Subcutaneous Q8H    insulin detemir U-100  14 Units Subcutaneous QHS    [START ON 2/12/2019] levothyroxine  75 mcg Oral Daily    lidocaine (PF) 10 mg/ml (1%)  1 mL Other Once    LORazepam  1 mg Oral BID    [START ON 2/12/2019] magnesium oxide  800 mg Oral Daily    pantoprazole  40 mg Oral Daily    tacrolimus  1.5 mg Oral BID    [START ON 2/12/2019] vilazodone  40 mg Oral Daily     Continuous Infusions:  PRN Meds:    Review of Systems   Constitutional: Positive for activity change (decreased) and fatigue (worsening). Negative for appetite change, chills, fever and unexpected weight change.   HENT: Negative for postnasal drip.    Eyes: Negative for visual disturbance.   Respiratory: Positive for shortness of breath (worse w exertion). Negative for cough and wheezing.    Cardiovascular: Negative for chest pain, palpitations and leg swelling.   Gastrointestinal: Negative for abdominal distention, abdominal pain, anal bleeding, blood in stool, constipation, diarrhea, nausea, rectal pain and vomiting.   Genitourinary: Negative for decreased urine volume, difficulty urinating, dysuria and hematuria.   Musculoskeletal: Negative for arthralgias and gait problem.   Skin: Positive for wound (RLQ surgical). Negative for rash.   Allergic/Immunologic: Positive for immunocompromised state.   Neurological: Positive for weakness. Negative for dizziness, light-headedness and headaches.   Psychiatric/Behavioral: The patient is nervous/anxious.      Objective:     Vital Signs (Most Recent):  Temp: 98.4 °F (36.9 °C) (02/11/19 1557)  Pulse: 96 (02/11/19 1557)  Resp: 19 (02/11/19 1557)  BP: (!) 140/66 (02/11/19 1557)  SpO2: 96 % (02/11/19 1557) Vital Signs (24h Range):  Temp:  [98.2 °F (36.8 °C)-99.1 °F (37.3 °C)]  98.4 °F (36.9 °C)  Pulse:  [] 96  Resp:  [18-19] 19  SpO2:  [96 %-97 %] 96 %  BP: (116-140)/(61-66) 140/66     Weight: 98.9 kg (218 lb)  Body mass index is 34.14 kg/m².    Intake/Output - Last 3 Shifts     None          Physical Exam   Constitutional: She is oriented to person, place, and time. She appears well-developed.   HENT:   Head: Normocephalic and atraumatic.   Eyes: Pupils are equal, round, and reactive to light. No scleral icterus.   Neck: Normal range of motion. Neck supple.   Cardiovascular: Normal rate and regular rhythm.   Murmur heard.  Pulmonary/Chest: Effort normal and breath sounds normal. No respiratory distress.   Abdominal: Soft. Bowel sounds are normal. She exhibits no distension. There is no tenderness.   Musculoskeletal: She exhibits no edema.   Neurological: She is alert and oriented to person, place, and time.   Skin: Skin is warm and dry. Capillary refill takes 2 to 3 seconds. She is not diaphoretic.   RLQ surgical incision CDI w steri strips   Psychiatric: She has a normal mood and affect. Her behavior is normal. Judgment and thought content normal.       Laboratory:  Immunosuppressants         Stop Route Frequency     tacrolimus capsule 1.5 mg      -- Oral 2 times daily        CBC:   Recent Labs   Lab 02/11/19  1645   WBC 10.16   RBC 2.23*   HGB 6.9*   HCT 20.8*      MCV 93   MCH 30.9   MCHC 33.2     CMP:   Recent Labs   Lab 02/11/19  1620   *   CALCIUM 9.0   ALBUMIN 3.5   PROT 6.7      K 4.5   CO2 23      BUN 17   CREATININE 1.3   ALKPHOS 234*   ALT 27   AST 16   BILITOT 0.6     Labs within the past 24 hours have been reviewed.    Diagnostic Results:  I have personally reviewed all pertinent imaging studies.

## 2019-02-11 NOTE — ASSESSMENT & PLAN NOTE
- presented to Urology to have stent removed.  Urine dipstick in clinic + UTI.  Dr Garcia cancelled procedure.  Per Dr Garcia and CG, pt did NOT receive abx in clinic.    - Urine cx pending.  - will give dose of Rocephin while waiting on urine cx.

## 2019-02-11 NOTE — ED TRIAGE NOTES
Pt complains of weakness, feeling off balance , and nauseated   Pt had a kidney transplant on January 14, 2018 Was seen in clinic to have stent removed from transplanted kidney but pt states the stent was not removed due to her having an UTI

## 2019-02-11 NOTE — ASSESSMENT & PLAN NOTE
Uncontrolled type 2 diabetes mellitus, with long-term current use of insulin    - Resumed long acting insulin at half diose.  Holding Januvia.  Low mod correction insulin ordered.  Will consult Endo.

## 2019-02-12 PROBLEM — E83.42 HYPOMAGNESEMIA: Status: ACTIVE | Noted: 2019-02-12

## 2019-02-12 PROBLEM — D64.9 ANEMIA: Status: ACTIVE | Noted: 2019-02-12

## 2019-02-12 LAB
ALBUMIN SERPL BCP-MCNC: 3.4 G/DL
ANION GAP SERPL CALC-SCNC: 8 MMOL/L
ANISOCYTOSIS BLD QL SMEAR: SLIGHT
ANISOCYTOSIS BLD QL SMEAR: SLIGHT
BASOPHILS NFR BLD: 0 %
BASOPHILS NFR BLD: 1 %
BLD PROD TYP BPU: NORMAL
BLOOD UNIT EXPIRATION DATE: NORMAL
BLOOD UNIT TYPE CODE: 7300
BLOOD UNIT TYPE: NORMAL
BUN SERPL-MCNC: 19 MG/DL
CALCIUM SERPL-MCNC: 9.1 MG/DL
CHLORIDE SERPL-SCNC: 107 MMOL/L
CO2 SERPL-SCNC: 24 MMOL/L
CODING SYSTEM: NORMAL
CREAT SERPL-MCNC: 1.2 MG/DL
DIFFERENTIAL METHOD: ABNORMAL
DIFFERENTIAL METHOD: ABNORMAL
DISPENSE STATUS: NORMAL
EOSINOPHIL NFR BLD: 2 %
EOSINOPHIL NFR BLD: 4 %
ERYTHROCYTE [DISTWIDTH] IN BLOOD BY AUTOMATED COUNT: 13.1 %
ERYTHROCYTE [DISTWIDTH] IN BLOOD BY AUTOMATED COUNT: 13.2 %
EST. GFR  (AFRICAN AMERICAN): 56.8 ML/MIN/1.73 M^2
EST. GFR  (NON AFRICAN AMERICAN): 49.2 ML/MIN/1.73 M^2
GLUCOSE SERPL-MCNC: 214 MG/DL
HCT VFR BLD AUTO: 23.2 %
HCT VFR BLD AUTO: 24.1 %
HGB BLD-MCNC: 7.2 G/DL
HGB BLD-MCNC: 7.3 G/DL
HYPOCHROMIA BLD QL SMEAR: ABNORMAL
IMM GRANULOCYTES # BLD AUTO: ABNORMAL K/UL
IMM GRANULOCYTES # BLD AUTO: ABNORMAL K/UL
IMM GRANULOCYTES NFR BLD AUTO: ABNORMAL %
IMM GRANULOCYTES NFR BLD AUTO: ABNORMAL %
LYMPHOCYTES NFR BLD: 0 %
LYMPHOCYTES NFR BLD: 1 %
MAGNESIUM SERPL-MCNC: 1.3 MG/DL
MCH RBC QN AUTO: 29.4 PG
MCH RBC QN AUTO: 29.7 PG
MCHC RBC AUTO-ENTMCNC: 30.3 G/DL
MCHC RBC AUTO-ENTMCNC: 31 G/DL
MCV RBC AUTO: 95 FL
MCV RBC AUTO: 98 FL
METAMYELOCYTES NFR BLD MANUAL: 1 %
METAMYELOCYTES NFR BLD MANUAL: 1 %
MONOCYTES NFR BLD: 2 %
MONOCYTES NFR BLD: 2 %
MYELOCYTES NFR BLD MANUAL: 2 %
NEUTROPHILS NFR BLD: 85 %
NEUTROPHILS NFR BLD: 90 %
NEUTS BAND NFR BLD MANUAL: 2 %
NEUTS BAND NFR BLD MANUAL: 5 %
NRBC BLD-RTO: 0 /100 WBC
NRBC BLD-RTO: 1 /100 WBC
OVALOCYTES BLD QL SMEAR: ABNORMAL
PHOSPHATE SERPL-MCNC: 2.4 MG/DL
PHOSPHATE SERPL-MCNC: 2.4 MG/DL
PLATELET # BLD AUTO: 242 K/UL
PLATELET # BLD AUTO: 259 K/UL
PLATELET BLD QL SMEAR: ABNORMAL
PLATELET BLD QL SMEAR: ABNORMAL
PMV BLD AUTO: 10.1 FL
PMV BLD AUTO: 10.2 FL
POCT GLUCOSE: 159 MG/DL (ref 70–110)
POCT GLUCOSE: 215 MG/DL (ref 70–110)
POCT GLUCOSE: 233 MG/DL (ref 70–110)
POIKILOCYTOSIS BLD QL SMEAR: SLIGHT
POLYCHROMASIA BLD QL SMEAR: ABNORMAL
POTASSIUM SERPL-SCNC: 4.9 MMOL/L
PROMYELOCYTES NFR BLD MANUAL: 2 %
RBC # BLD AUTO: 2.45 M/UL
RBC # BLD AUTO: 2.46 M/UL
SODIUM SERPL-SCNC: 139 MMOL/L
TRANS ERYTHROCYTES VOL PATIENT: NORMAL ML
TROPONIN I SERPL DL<=0.01 NG/ML-MCNC: <0.006 NG/ML
WBC # BLD AUTO: 11.77 K/UL
WBC # BLD AUTO: 8.87 K/UL

## 2019-02-12 PROCEDURE — 20600001 HC STEP DOWN PRIVATE ROOM

## 2019-02-12 PROCEDURE — 85007 BL SMEAR W/DIFF WBC COUNT: CPT

## 2019-02-12 PROCEDURE — P9021 RED BLOOD CELLS UNIT: HCPCS

## 2019-02-12 PROCEDURE — 84484 ASSAY OF TROPONIN QUANT: CPT | Mod: 91

## 2019-02-12 PROCEDURE — 63600175 PHARM REV CODE 636 W HCPCS: Performed by: PHYSICIAN ASSISTANT

## 2019-02-12 PROCEDURE — 83735 ASSAY OF MAGNESIUM: CPT

## 2019-02-12 PROCEDURE — 27201040 HC RC 50 FILTER

## 2019-02-12 PROCEDURE — 25000003 PHARM REV CODE 250: Performed by: PHYSICIAN ASSISTANT

## 2019-02-12 PROCEDURE — 85027 COMPLETE CBC AUTOMATED: CPT

## 2019-02-12 PROCEDURE — 25500020 PHARM REV CODE 255: Performed by: STUDENT IN AN ORGANIZED HEALTH CARE EDUCATION/TRAINING PROGRAM

## 2019-02-12 PROCEDURE — 99233 SBSQ HOSP IP/OBS HIGH 50: CPT | Mod: 24,,, | Performed by: PHYSICIAN ASSISTANT

## 2019-02-12 PROCEDURE — 99223 PR INITIAL HOSPITAL CARE,LEVL III: ICD-10-PCS | Mod: ,,, | Performed by: NURSE PRACTITIONER

## 2019-02-12 PROCEDURE — 36415 COLL VENOUS BLD VENIPUNCTURE: CPT

## 2019-02-12 PROCEDURE — 99223 1ST HOSP IP/OBS HIGH 75: CPT | Mod: ,,, | Performed by: NURSE PRACTITIONER

## 2019-02-12 PROCEDURE — 80069 RENAL FUNCTION PANEL: CPT

## 2019-02-12 PROCEDURE — 99233 PR SUBSEQUENT HOSPITAL CARE,LEVL III: ICD-10-PCS | Mod: 24,,, | Performed by: PHYSICIAN ASSISTANT

## 2019-02-12 RX ORDER — CIPROFLOXACIN 500 MG/1
500 TABLET ORAL EVERY 12 HOURS
Status: DISCONTINUED | OUTPATIENT
Start: 2019-02-12 | End: 2019-02-13 | Stop reason: HOSPADM

## 2019-02-12 RX ORDER — MAGNESIUM SULFATE HEPTAHYDRATE 40 MG/ML
2 INJECTION, SOLUTION INTRAVENOUS
Status: COMPLETED | OUTPATIENT
Start: 2019-02-12 | End: 2019-02-12

## 2019-02-12 RX ORDER — HYDROCODONE BITARTRATE AND ACETAMINOPHEN 500; 5 MG/1; MG/1
TABLET ORAL
Status: DISCONTINUED | OUTPATIENT
Start: 2019-02-12 | End: 2019-02-13 | Stop reason: HOSPADM

## 2019-02-12 RX ORDER — HYDRALAZINE HYDROCHLORIDE 20 MG/ML
10 INJECTION INTRAMUSCULAR; INTRAVENOUS EVERY 6 HOURS PRN
Status: DISCONTINUED | OUTPATIENT
Start: 2019-02-12 | End: 2019-02-13 | Stop reason: HOSPADM

## 2019-02-12 RX ADMIN — PANTOPRAZOLE SODIUM 40 MG: 40 TABLET, DELAYED RELEASE ORAL at 08:02

## 2019-02-12 RX ADMIN — LEVOTHYROXINE SODIUM 75 MCG: 75 TABLET ORAL at 07:02

## 2019-02-12 RX ADMIN — VILAZODONE HYDROCHLORIDE 40 MG: 10 TABLET ORAL at 10:02

## 2019-02-12 RX ADMIN — CIPROFLOXACIN HYDROCHLORIDE 500 MG: 500 TABLET, FILM COATED ORAL at 10:02

## 2019-02-12 RX ADMIN — MAGNESIUM OXIDE TAB 400 MG (241.3 MG ELEMENTAL MG) 800 MG: 400 (241.3 MG) TAB at 08:02

## 2019-02-12 RX ADMIN — HEPARIN SODIUM 5000 UNITS: 5000 INJECTION, SOLUTION INTRAVENOUS; SUBCUTANEOUS at 04:02

## 2019-02-12 RX ADMIN — INSULIN ASPART 2 UNITS: 100 INJECTION, SOLUTION INTRAVENOUS; SUBCUTANEOUS at 08:02

## 2019-02-12 RX ADMIN — ATORVASTATIN CALCIUM 40 MG: 20 TABLET, FILM COATED ORAL at 08:02

## 2019-02-12 RX ADMIN — LORAZEPAM 1 MG: 1 TABLET ORAL at 08:02

## 2019-02-12 RX ADMIN — ATOVAQUONE 1500 MG: 750 SUSPENSION ORAL at 08:02

## 2019-02-12 RX ADMIN — MAGNESIUM SULFATE IN WATER 2 G: 40 INJECTION, SOLUTION INTRAVENOUS at 08:02

## 2019-02-12 RX ADMIN — IOHEXOL 15 ML: 350 INJECTION, SOLUTION INTRAVENOUS at 05:02

## 2019-02-12 RX ADMIN — HEPARIN SODIUM 5000 UNITS: 5000 INJECTION, SOLUTION INTRAVENOUS; SUBCUTANEOUS at 09:02

## 2019-02-12 RX ADMIN — TACROLIMUS 1.5 MG: 1 CAPSULE ORAL at 08:02

## 2019-02-12 RX ADMIN — HYDRALAZINE HYDROCHLORIDE 10 MG: 20 INJECTION INTRAMUSCULAR; INTRAVENOUS at 04:02

## 2019-02-12 RX ADMIN — ONDANSETRON 8 MG: 8 TABLET, ORALLY DISINTEGRATING ORAL at 08:02

## 2019-02-12 RX ADMIN — TACROLIMUS 1.5 MG: 1 CAPSULE ORAL at 05:02

## 2019-02-12 RX ADMIN — HEPARIN SODIUM 5000 UNITS: 5000 INJECTION, SOLUTION INTRAVENOUS; SUBCUTANEOUS at 06:02

## 2019-02-12 RX ADMIN — LORAZEPAM 1 MG: 1 TABLET ORAL at 09:02

## 2019-02-12 RX ADMIN — INSULIN ASPART 2 UNITS: 100 INJECTION, SOLUTION INTRAVENOUS; SUBCUTANEOUS at 12:02

## 2019-02-12 RX ADMIN — CIPROFLOXACIN HYDROCHLORIDE 500 MG: 500 TABLET, FILM COATED ORAL at 09:02

## 2019-02-12 RX ADMIN — MAGNESIUM SULFATE IN WATER 2 G: 40 INJECTION, SOLUTION INTRAVENOUS at 10:02

## 2019-02-12 NOTE — ASSESSMENT & PLAN NOTE
- Creatinine stable. No changes in UOP.  -Kidney US 2/11 with good perfusion. 2 fluid collections noted  -CT A/P without contrast ordered to assess further for IA bleeding  -Continue to monitor.

## 2019-02-12 NOTE — H&P
[facility]  Kidney Transplant  H&P  HPI:  Ms. Andra Newell is a 38 y/o F with hx of ESRD 2/2 DM II  now s/p LURT 1/14/19 (Campath induction, CMV D+/R-, TIT 58 min). Immunosuppression per Campath induction protocol. She is a CMV study participant. Surgery was uncomplicated. She was due to have ureteral stent removed today in Urology clinic. However, UA was obtained and noted with possible UTI. Therefore, procedure was cancelled.  Urine cx is pending. She now presents to the ED with complaints of SOB, dyspnea on exertion, chest pain, weakness x 1 day. She was recently admitted and discharged from the hospital on 2/7/19 with complaints of dizziness. MRI was attempted at that time. However, unable to be obtained inpatient as patient needs to be sedated for testing, which cannot be done inpatient. She was scheduled for MRI brain as an outpatient. ED work up negative for acute cardiac event. Lab work      Scheduled Meds:   [START ON 2/12/2019] atorvastatin  40 mg Oral Daily    [START ON 2/12/2019] atovaquone  1,500 mg Oral Daily    cefTRIAXone (ROCEPHIN) IVPB  1 g Intravenous Q24H    heparin (porcine)  5,000 Units Subcutaneous Q8H    insulin detemir U-100  14 Units Subcutaneous QHS    [START ON 2/12/2019] levothyroxine  75 mcg Oral Daily    lidocaine (PF) 10 mg/ml (1%)  1 mL Other Once    LORazepam  1 mg Oral BID    [START ON 2/12/2019] magnesium oxide  800 mg Oral Daily    pantoprazole  40 mg Oral Daily    tacrolimus  1.5 mg Oral BID    [START ON 2/12/2019] vilazodone  40 mg Oral Daily     Continuous Infusions:  PRN Meds:    Review of Systems   Constitutional: Positive for activity change (decreased) and fatigue (worsening). Negative for appetite change, chills, fever and unexpected weight change.   HENT: Negative for postnasal drip.    Eyes: Negative for visual disturbance.   Respiratory: Positive for shortness of breath (worse w exertion). Negative for cough and wheezing.     Cardiovascular: Negative for chest pain, palpitations and leg swelling.   Gastrointestinal: Negative for abdominal distention, abdominal pain, anal bleeding, blood in stool, constipation, diarrhea, nausea, rectal pain and vomiting.   Genitourinary: Negative for decreased urine volume, difficulty urinating, dysuria and hematuria.   Musculoskeletal: Negative for arthralgias and gait problem.   Skin: Positive for wound (RLQ surgical). Negative for rash.   Allergic/Immunologic: Positive for immunocompromised state.   Neurological: Positive for weakness. Negative for dizziness, light-headedness and headaches.   Psychiatric/Behavioral: The patient is nervous/anxious.      Objective:     Vital Signs (Most Recent):  Temp: 98.4 °F (36.9 °C) (02/11/19 1557)  Pulse: 96 (02/11/19 1557)  Resp: 19 (02/11/19 1557)  BP: (!) 140/66 (02/11/19 1557)  SpO2: 96 % (02/11/19 1557) Vital Signs (24h Range):  Temp:  [98.2 °F (36.8 °C)-99.1 °F (37.3 °C)] 98.4 °F (36.9 °C)  Pulse:  [] 96  Resp:  [18-19] 19  SpO2:  [96 %-97 %] 96 %  BP: (116-140)/(61-66) 140/66     Weight: 98.9 kg (218 lb)  Body mass index is 34.14 kg/m².    Intake/Output - Last 3 Shifts     None          Physical Exam   Constitutional: She is oriented to person, place, and time. She appears well-developed.   HENT:   Head: Normocephalic and atraumatic.   Eyes: Pupils are equal, round, and reactive to light. No scleral icterus.   Neck: Normal range of motion. Neck supple.   Cardiovascular: Normal rate and regular rhythm.   Murmur heard.  Pulmonary/Chest: Effort normal and breath sounds normal. No respiratory distress.   Abdominal: Soft. Bowel sounds are normal. She exhibits no distension. There is no tenderness.   Musculoskeletal: She exhibits no edema.   Neurological: She is alert and oriented to person, place, and time.   Skin: Skin is warm and dry. Capillary refill takes 2 to 3 seconds. She is not diaphoretic.   RLQ surgical incision CDI w steri strips   Psychiatric:  She has a normal mood and affect. Her behavior is normal. Judgment and thought content normal.       Laboratory:  Immunosuppressants         Stop Route Frequency     tacrolimus capsule 1.5 mg      -- Oral 2 times daily        CBC:   Recent Labs   Lab 02/11/19  1645   WBC 10.16   RBC 2.23*   HGB 6.9*   HCT 20.8*      MCV 93   MCH 30.9   MCHC 33.2     CMP:   Recent Labs   Lab 02/11/19  1620   *   CALCIUM 9.0   ALBUMIN 3.5   PROT 6.7      K 4.5   CO2 23      BUN 17   CREATININE 1.3   ALKPHOS 234*   ALT 27   AST 16   BILITOT 0.6     Labs within the past 24 hours have been reviewed.    Diagnostic Results:  I have personally reviewed all pertinent imaging studies.    Assessment/Plan:     Acute on chronic anemia    - Pt w hx of chronic anemia.  She presented to ER w dyspnea and chest heaviness.  Hgb 6.9.  Last Hgb on 2/7 was 7.3.  Pt is s/p kidney transplant.  Will collect stool for occult blood.  Kidney US to r/o fluid collection/bleed.  Will type and screen pt and transfuse 1u PRBC.  Serial h/h ordered.  Iron studies ordered.  - pt also w hx of GERD.  Was on Pepcid, will change to Protonix.      Long term current use of immunosuppressive drug    - see prophylactic immunosupression       At risk for opportunistic infections    - cont Atovaquone and on Study drug for CMV     Status post living-donor kidney transplantation    - s/p living kidney transplant 1/14/19 for ESRD 2/2 secondary DM, Type II and HTN, Campath induction.  Cr trended down nicely. Surgery uncomplicated. Patient progressed well post-operatively. Her bull was removed on POD #3 without difficulty. She had no drains.   - scheduled to have stent removed today.  UTI noted in Urology clinic.  Pt also c/o chest heaviness and dyspnea.  Of note, she was kept inpatient an additional day due to CP/indigestion (EKG NSR, troponin wnl, relieved with increase in pepsid frequency + maalox) post transplant.  Pt was also admitted to [facility]  hospital for concern of cerebellar stroke 2/7. At that time, pt reported similar symptoms- waking feeling dizzy and especially fatigued.   MRI was postponed given she needs sedation which cannot be done in patient.  She is scheduled for MRI brain outpt 2/13/19.    - pt reports symptoms started yesterday evening and persisted today.  Blood glucose 200's.  She reports tolerating meals.  She notes mild nausea and again endorses GERD.      - cr 1.  She denies change in UOP.    - of note h/h decreased, kidney US ordered.       Gastroesophageal reflux disease without esophagitis    - changed to Protonix       Exertional dyspnea    - worse over past 18 hours.  EKG unremarkable for STEMI.  Serial troponin's ordered.    - CXR ordered.         Dysuria    - presented to Urology to have stent removed.  Urine dipstick in clinic + UTI.  Dr Garcia cancelled procedure.  Per Dr Garcia and CG, pt did NOT receive abx in clinic.    - Urine cx pending.  - will give dose of Rocephin while waiting on urine cx.     Prophylactic immunotherapy    - Chronic Prophylactic Immunosuppression- cont to check prograf level daily.  Assess for toxicity and adjust level as needed         Fatigue    - worse over past 24 hours       Hypothyroidism    - cont home dose Synthroid       Depression    - cont home meds for depression     Uncontrolled type 2 diabetes mellitus, with long-term current use of insulin        Uncontrolled type 2 diabetes mellitus, with long-term current use of insulin    - Resumed long acting insulin at half diose.  Holding Januvia.  Low mod correction insulin ordered.  Will consult Endo.                      Discharge Planning:  No plan for discharge.  Admit Observation.     Julissa Lindquist NP  Kidney Transplant  [facility]

## 2019-02-12 NOTE — CONSULTS
Please see consult note by Cheryl Winslow PA-C dated 2/12/19.     Staff Transplant Nephrology Addendum:     Patient was discussed on rounds with Cheryl Winslow PA-C as outlined in her note. I have personally evaluated Ms. Newell and agree with the findings listed in Cheryl's attached note with additional comments/corrections as below:     58 y.o. WF with history of DM, HTN, obesity, hypothyroidism, anxiety, depression, CKD secondary to DM/HTN +/- antibiotic induced nephrotoxicity (stated she received 6 weeks of antibiotics for MRSA infection - possible osteomyelitis) who was on HD from 4/13/18 until beginning of Sept 2018 who is s/p LURT from friend (Campath induction, WIT 21 minutes, CIT 37 minutes, high risk for CMV given CMV D+/R-) on 1/14/19. She was discharged from initial transplant hospitalization on 1/18/19 with Cr of 1.1. She was admitted at [facility] from 2/3/19 to 2/6/19 for concern for stroke and then transferred to [facility] from 2/6/19 to 2/7/19 for further management. She had unsteady gait and dizziness during this hospitalization but symptoms improved and she would need MRI with sedation which couldn't be done inpatient hence she was discharged with plan for outpatient MRI on 2/13/19. She was supposed to have outpatient cysto and stent removal on 2/11/19 but this was cancelled secondary to suspicion for UTI. She then complained of SOB and weakness/unsteady gait hence was directed by transplant coordinator to go to ED.      Patient and significant other report she did well over the weekend but she felt weak on day of admission. SBPs on lower end at times in the 110s. Hb also noted to be low at 6.9 on admission thus she received 1 unit PRBC last night with some improvement in symptoms this morning but however by midday bed side rounds by this provider she was feeling weak again. Additionally noted to have tachycardia and desaturation while standing to do orthostatic vitals. Will obtain VQ scan  and LE dopplers to assess for PE. SOB could be related to symptomatic anemia --> will transfuse another 1 unit PRBC today. Will obtain CT A/P to see if there is any source for blood loss/accumulation. Query whether CMV study drugs could cause anemia (patient has been off of MMF and Bactrim already for a few days and reviewing her med list this would be the only other potential medication culprit) --> discussed with research team and they were agreeable to holding study drugs for a few days. Cr improved from 1.3 --> 1.2. Will change ceftriaxone to cipro in light of previous urine culture results. Continue Prograf 1.5 mg BID. Continue to monitor for toxicities from immunosuppressive medications.     Anahy Weiss MD  Renal Transplant Attending

## 2019-02-12 NOTE — ASSESSMENT & PLAN NOTE
- Pt w hx of chronic anemia.  She presented to ER w dyspnea and chest heaviness.  Hgb 6.9.  Last Hgb on 2/7 was 7.3.    -Stool in ED guaiac negative  -She was transfused 1 unit of PRBC on admit. H/H only increased to 7.2/23 from 6/20 with 1 unit of PRBC.   -Kidney US 2/11 with good perfusion. 2 fluid collections noted  -CT A/P without contrast ordered to assess further for IA bleeding  -Transfuse another 1 unit of PRBC today.   -Continue to monitor.

## 2019-02-12 NOTE — CONSULTS
"[facility]  Kidney Transplant  Consult Note    Consults   HPI: Ms. Andra Newell is a 40 y/o F with hx of ESRD 2/2 DM II  now s/p LURT 1/14/19 (Campath induction, CMV D+/R-, TIT 58 min). Immunosuppression per Campath induction protocol. She is a CMV study participant. Surgery was uncomplicated. She was due to have ureteral stent removed 2/11 in Urology clinic. However, UA was obtained and noted with possible UTI. Therefore, procedure was cancelled.  Urine cx is pending. She then presented to the ED on 2/11with complaints of SOB, dyspnea on exertion, chest pain, weakness x 1 day. She was recently admitted and discharged from the hospital on 2/7/19 with a similar complaint of dizziness. MRI was attempted at that time. However, unable to be obtained inpatient as patient needs to be sedated for testing, which cannot be done inpatient. She was scheduled for MRI brain as an outpatient on 2/12. ED work up negative for acute cardiac event. Chest x-ray with no acute process. Lab work in ED notable for acute drop in H/H. Patient with no hematochezia, melena. Stool in ED negative for guaiac. Kidney US obtained which did show 2 fluid collections. Abdomen soft, non tender. She was placed on antibiotic for possible UTI, repeat UA and cx obtained. She was transfused 1 unit of PRBC on admit. H/H only increased to 7.2/23 from 6/20 with 1 unit of PRBC. This AM, she reports that she is still experiencing weakness, feeling "off balanced" especially when going from seated position to a standing position. SOB also still present with movement. Plan to obtain CT A/P without contrast to rule out IA bleeding as cause of ongoing anemia. Patient is also part of CMV research study. Discussed possible side effects from research drug with the research team. While, the team reports that anemia not likely from medications and that they have not had similar symptoms reported in other patients, they are in agreement that we will " hold medications for 3-4 drugs and assess for improvement in admission symptoms/anemia. Given SOB, plan to obtain US of BLE to rule out DVT along with VQ scan.       Scheduled Meds:   atorvastatin  40 mg Oral Daily    atovaquone  1,500 mg Oral Daily    ciprofloxacin HCl  500 mg Oral Q12H    heparin (porcine)  5,000 Units Subcutaneous Q8H    insulin detemir U-100  22 Units Subcutaneous QHS    levothyroxine  75 mcg Oral Daily    lidocaine (PF) 10 mg/ml (1%)  1 mL Other Once    LORazepam  1 mg Oral BID    magnesium oxide  800 mg Oral Daily    pantoprazole  40 mg Oral Daily    tacrolimus  1.5 mg Oral BID    vilazodone  40 mg Oral Daily     Continuous Infusions:  PRN Meds:    Review of Systems   Constitutional: Positive for activity change (decreased) and fatigue (worsening). Negative for appetite change, chills, fever and unexpected weight change.   HENT: Negative for postnasal drip.    Eyes: Negative for visual disturbance.   Respiratory: Positive for shortness of breath (worse w exertion). Negative for cough and wheezing.    Cardiovascular: Negative for chest pain, palpitations and leg swelling.   Gastrointestinal: Positive for nausea. Negative for abdominal distention, abdominal pain, anal bleeding, blood in stool, constipation, diarrhea, rectal pain and vomiting.   Genitourinary: Negative for decreased urine volume, difficulty urinating, dysuria and hematuria.   Musculoskeletal: Negative for arthralgias.   Skin: Positive for wound (RLQ surgical). Negative for rash.   Allergic/Immunologic: Positive for immunocompromised state.   Neurological: Positive for weakness. Negative for dizziness, light-headedness and headaches.   Psychiatric/Behavioral: The patient is nervous/anxious.      Objective:     Vital Signs (Most Recent):  Temp: 98.3 °F (36.8 °C) (02/12/19 1131)  Pulse: 96 (02/12/19 1131)  Resp: 18 (02/12/19 1131)  BP: 123/60 (02/12/19 1131)  SpO2: 96 % (02/12/19 1131) Vital Signs (24h Range):  Temp:   [98.2 °F (36.8 °C)-99.1 °F (37.3 °C)] 98.3 °F (36.8 °C)  Pulse:  [] 96  Resp:  [16-20] 18  SpO2:  [88 %-98 %] 96 %  BP: (110-165)/(53-76) 123/60     Weight: 97.1 kg (214 lb 1.1 oz)  Body mass index is 33.53 kg/m².    Intake/Output - Last 3 Shifts       02/10 0700 - 02/11 0659 02/11 0700 - 02/12 0659 02/12 0700 - 02/13 0659           Urine Occurrence   1 x    Stool Occurrence  0 x 0 x          Physical Exam   Constitutional: She is oriented to person, place, and time. She appears well-developed.   HENT:   Head: Normocephalic and atraumatic.   Eyes: Pupils are equal, round, and reactive to light. No scleral icterus.   Neck: Normal range of motion. Neck supple.   Cardiovascular: Normal rate and regular rhythm.   Murmur heard.  Pulmonary/Chest: Effort normal and breath sounds normal. No respiratory distress.   Abdominal: Soft. Bowel sounds are normal. She exhibits no distension. There is no tenderness.   Musculoskeletal: She exhibits no edema.   Neurological: She is alert and oriented to person, place, and time.   Skin: Skin is warm and dry. Capillary refill takes 2 to 3 seconds. She is not diaphoretic.   RLQ surgical incision CDI w steri strips   Psychiatric: She has a normal mood and affect. Her behavior is normal. Judgment and thought content normal.       Laboratory:  Immunosuppressants         Stop Route Frequency     tacrolimus capsule 1.5 mg      -- Oral 2 times daily        CBC:   Recent Labs   Lab 02/12/19  0759   WBC 8.87   RBC 2.45*   HGB 7.2*   HCT 23.2*      MCV 95   MCH 29.4   MCHC 31.0*     CMP:   Recent Labs   Lab 02/11/19  1620 02/12/19  0427   * 214*   CALCIUM 9.0 9.1   ALBUMIN 3.5 3.4*   PROT 6.7  --     139   K 4.5 4.9   CO2 23 24    107   BUN 17 19   CREATININE 1.3 1.2   ALKPHOS 234*  --    ALT 27  --    AST 16  --    BILITOT 0.6  --      Labs within the past 24 hours have been reviewed.    Diagnostic Results:  I have personally reviewed all pertinent imaging  studies.    Assessment/Plan:     * Acute on chronic anemia    - Pt w hx of chronic anemia.  She presented to ER w dyspnea and chest heaviness.  Hgb 6.9.  Last Hgb on 2/7 was 7.3.    -Stool in ED guaiac negative  -She was transfused 1 unit of PRBC on admit. H/H only increased to 7.2/23 from 6/20 with 1 unit of PRBC.   -Kidney US 2/11 with good perfusion. 2 fluid collections noted  -CT A/P without contrast ordered to assess further for IA bleeding  -Transfuse another 1 unit of PRBC today.   -Continue to monitor.      Hypomagnesemia    Replace with IV Mg. Continue oral mag. Continue to monitor with daily labs.        At risk for opportunistic infections    - cont Atovaquone  -Patient is also part of CMV research study.   -CMV PCR from 2/7/19 negative.  -Discussed possible side effects from research drug with the research team.   -While, the team reports that anemia not likely from medications and that they have not had similar symptoms reported in other patients, they are in agreement that we will hold medications for 3-4 drugs and assess for improvement in admission symptoms/anemia.     Status post living-donor kidney transplantation    - Creatinine stable. No changes in UOP.  -Kidney US 2/11 with good perfusion. 2 fluid collections noted  -CT A/P without contrast ordered to assess further for IA bleeding  -Continue to monitor.      Exertional dyspnea    -  EKG unremarkable for STEMI.  Serial troponin's negative  - CXR with no acute process  -Will obtain VQ scan to rule out PE along with bilateral lower extremity US to rule out DVT.        Dysuria    - presented to Urology to have stent removed.  Urine dipstick in clinic + UTI.  Dr Garcia cancelled procedure.  Per Dr Garcia and CG, pt did NOT receive abx in clinic.    - Urine cx pending.  - Given dose of Rocephin on admit.  -However, previous cx resistent to Rocephin. Therefore, will start cipro while waiting final urine cx.     Fatigue    -possibly due to anemia vs  possible UTI vs medication induced.   -Work up in progess.        Uncontrolled type 2 diabetes mellitus, with long-term current use of insulin    Resumed long acting insulin at half diose.    Holding Januvia.    Low mod correction insulin ordered.   Endocrine consulted. Appreciate their assistance.              Discharge Planning: Not a candidate for discharge at this time.         ANGELA TeranC  Kidney Transplant  [facility]

## 2019-02-12 NOTE — ASSESSMENT & PLAN NOTE
Resumed long acting insulin at half diose.    Holding Januvia.    Low mod correction insulin ordered.   Endocrine consulted. Appreciate their assistance.

## 2019-02-12 NOTE — ASSESSMENT & PLAN NOTE
- cont Atovaquone  -Patient is also part of CMV research study.   -CMV PCR from 2/7/19 negative.  -Discussed possible side effects from research drug with the research team.   -While, the team reports that anemia not likely from medications and that they have not had similar symptoms reported in other patients, they are in agreement that we will hold medications for 3-4 drugs and assess for improvement in admission symptoms/anemia.

## 2019-02-12 NOTE — SUBJECTIVE & OBJECTIVE
Interval HPI:   Overnight events:   NAEON. Patient recently discharge from hospital s/p kidney transplant. BG is currently above goal, but trending downward. Will monitor for prandial excursions and additional insulin needs.     Eatin%  Nausea: No  Hypoglycemia and intervention: No  Fever: No  TPN and/or TF: No  If yes, type of TF/TPN and rate: None    PMH, PSH, FH, SH reviewed     Review of Systems   Constitutional: Negative for weight changes.  Eyes: Positive for visual disturbance. Complains of blurry vision.  Respiratory: Negative for cough.   Cardiovascular: Positive for chest pain.  Gastrointestinal: Positive for nausea.  Endocrine: Positive for polyuria, polydipsia.  Musculoskeletal: Positive for chronic back pain.  Skin: Negative for rash.  Neurological: Negative for syncope. Positive for dizziness  Psychiatric/Behavioral: Negative for depression.    Current Medications and/or Treatments Impacting Glycemic Control  Immunotherapy:    Immunosuppressants         Stop Route Frequency     tacrolimus capsule 1.5 mg      -- Oral 2 times daily        Steroids:   Hormones (From admission, onward)    None        Pressors:    Autonomic Drugs (From admission, onward)    None        Hyperglycemia/Diabetes Medications:   Antihyperglycemics (From admission, onward)    Start     Stop Route Frequency Ordered    19 2100  insulin detemir U-100 pen 22 Units      -- SubQ Nightly 19 0922    19 1819  insulin aspart U-100 pen 0-5 Units      -- SubQ Before meals & nightly PRN 19 1719             PHYSICAL EXAMINATION:  Vitals:    19 0707   BP: (!) 146/65   Pulse: 106   Resp: 19   Temp: 98.6 °F (37 °C)     Body mass index is 33.53 kg/m².    Physical Exam   Constitutional: Well developed, well nourished, NAD.  ENT: External ears no masses with nose patent; normal hearing.  Neck: Supple; trachea midline.  Cardiovascular: Normal heart sounds, no LE edema. DP +2 bilaterally.  Lungs: Normal effort;  lungs anterior bilaterally clear to auscultation.  Abdomen: Soft, obese, no masses, no hernias.  MS: No clubbing or cyanosis of nails noted; unable to assess gait.  Skin: No rashes, lesions, or ulcers; no nodules.   Psychiatric: Good judgement and insight; normal mood and affect.  Neurological: Cranial nerves are grossly intact. Normal sensation in the bilateral lower extremities.  Foot: Nails in good condition, no amputations noted.

## 2019-02-12 NOTE — ASSESSMENT & PLAN NOTE
- s/p living kidney transplant 1/14/19 for ESRD 2/2 secondary DM, Type II and HTN, Campath induction.  Cr trended down nicely. Surgery uncomplicated. Patient progressed well post-operatively. Her bull was removed on POD #3 without difficulty. She had no drains.   - scheduled to have stent removed today.  UTI noted in Urology clinic.  Pt also c/o chest heaviness and dyspnea.  Of note, she was kept inpatient an additional day due to CP/indigestion (EKG NSR, troponin wnl, relieved with increase in pepsid frequency + maalox) post transplant.  Pt was also admitted to [facility] for concern of cerebellar stroke 2/7. At that time, pt reported similar symptoms- waking feeling dizzy and especially fatigued.   MRI was postponed given she needs sedation which cannot be done in patient.  She is scheduled for MRI brain outpt 2/13/19.    - pt reports symptoms started yesterday evening and persisted today.  Blood glucose 200's.  She reports tolerating meals.  She notes mild nausea and again endorses GERD.      - cr 1.  She denies change in UOP.    - of note h/h decreased, kidney US ordered.

## 2019-02-12 NOTE — CONSULTS
[facility]  Endocrinology  Diabetes Consult Note    Consult Requested by: Kd Perea MD   Reason for admit: Acute on chronic anemia    HISTORY OF PRESENT ILLNESS:  Reason for Consult: Management of T2DM, Hyperglycemia     Surgical Procedure and Date: OKT x1 on 2019    Diabetes diagnosis year:         Home Diabetes Medications:    Basaglar 28 units HS    Januvia 100 mg daily    Low Dose SQ Insulin Correction Scale as needed with meals    How often checking glucose at home? 1-3 x day   BG readings on regimen: low 200's  Hypoglycemia on the regimen?  No  Missed doses on regimen?  No    Diabetes Complications include:     Hyperglycemia and Diabetic peripheral neuropathy     Complicating diabetes co morbidities:   Thyroid disease and Obesity      HPI:   Patient is a 58 y.o. female with a diagnosis of ESRD 2/2 DM II  now s/p LURT 19 (Campath induction, CMV D+/R-, TIT 58 min). Immunosuppression per Campath induction protocol. She is a CMV study participant. Surgery was uncomplicated. ED work up negative for acute cardiac event. Endocrinology consulted to manage hyperglycemia/DM2.      Interval HPI:   Overnight events:   NAEON. Patient recently discharge from hospital s/p kidney transplant. BG is currently above goal, but trending downward. Will monitor for prandial excursions and additional insulin needs.     Eatin%  Nausea: No  Hypoglycemia and intervention: No  Fever: No  TPN and/or TF: No  If yes, type of TF/TPN and rate: None    PMH, PSH, FH, SH reviewed     Review of Systems   Constitutional: Negative for weight changes.  Eyes: Positive for visual disturbance. Complains of blurry vision.  Respiratory: Negative for cough.   Cardiovascular: Positive for chest pain.  Gastrointestinal: Positive for nausea.  Endocrine: Positive for polyuria, polydipsia.  Musculoskeletal: Positive for chronic back pain.  Skin: Negative for rash.  Neurological: Negative for syncope.  Positive for dizziness  Psychiatric/Behavioral: Negative for depression.    Current Medications and/or Treatments Impacting Glycemic Control  Immunotherapy:    Immunosuppressants         Stop Route Frequency     tacrolimus capsule 1.5 mg      -- Oral 2 times daily        Steroids:   Hormones (From admission, onward)    None        Pressors:    Autonomic Drugs (From admission, onward)    None        Hyperglycemia/Diabetes Medications:   Antihyperglycemics (From admission, onward)    Start     Stop Route Frequency Ordered    02/12/19 2100  insulin detemir U-100 pen 22 Units      -- SubQ Nightly 02/12/19 0922    02/11/19 1819  insulin aspart U-100 pen 0-5 Units      -- SubQ Before meals & nightly PRN 02/11/19 1719             PHYSICAL EXAMINATION:  Vitals:    02/12/19 0707   BP: (!) 146/65   Pulse: 106   Resp: 19   Temp: 98.6 °F (37 °C)     Body mass index is 33.53 kg/m².    Physical Exam   Constitutional: Well developed, well nourished, NAD.  ENT: External ears no masses with nose patent; normal hearing.  Neck: Supple; trachea midline.  Cardiovascular: Normal heart sounds, no LE edema. DP +2 bilaterally.  Lungs: Normal effort; lungs anterior bilaterally clear to auscultation.  Abdomen: Soft, obese, no masses, no hernias.  MS: No clubbing or cyanosis of nails noted; unable to assess gait.  Skin: No rashes, lesions, or ulcers; no nodules.   Psychiatric: Good judgement and insight; normal mood and affect.  Neurological: Cranial nerves are grossly intact. Normal sensation in the bilateral lower extremities.  Foot: Nails in good condition, no amputations noted.         Labs Reviewed and Include   Recent Labs   Lab 02/12/19  0427   *   CALCIUM 9.1   ALBUMIN 3.4*      K 4.9   CO2 24      BUN 19   CREATININE 1.2     Lab Results   Component Value Date    WBC 8.87 02/12/2019    HGB 7.2 (L) 02/12/2019    HCT 23.2 (L) 02/12/2019    MCV 95 02/12/2019     02/12/2019     No results for input(s): TSH, FREET4  in the last 168 hours.  Lab Results   Component Value Date    HGBA1C 6.5 (H) 02/04/2019       Nutritional status:   Body mass index is 33.53 kg/m².  Lab Results   Component Value Date    ALBUMIN 3.4 (L) 02/12/2019    ALBUMIN 3.5 02/11/2019    ALBUMIN 3.7 02/11/2019     No results found for: PREALBUMIN    Estimated Creatinine Clearance: 59.7 mL/min (based on SCr of 1.2 mg/dL).    Accu-Checks  Recent Labs     02/11/19  1858 02/11/19  2157 02/12/19  0737 02/12/19  1158   POCTGLUCOSE 256* 207* 233* 215*        ASSESSMENT and PLAN    Uncontrolled type 2 diabetes mellitus, with long-term current use of insulin    BG goal 140-180    Start Levemir 26 units HS  Start Januvia 100 mg daily  Start Low Dose SQ Insulin Correction Scale PRN TIDWM    Discharge Recommendations:  Continue home regimen.   [place] [facility] [place]         Status post living-donor kidney transplantation    Managed per primary team  Avoid hypoglycemia         BMI 36.0-36.9,adult    may contribute to insulin resistance  Body mass index is 33.53 kg/m².         Adverse effect of adrenal cortical steroids, sequela    On standard steroid taper per transplant team; may elevate BG readings         Prophylactic immunotherapy    On standard steroid taper per transplant team; may elevate BG readings             Plan discussed with patient, family, and RN at bedside.     Tre Sykes, FIDENCIO  Endocrinology  [facility]

## 2019-02-12 NOTE — OP NOTE
DATE OF PROCEDURE:  02/11/2019.    PREOPERATIVE DIAGNOSES:  Status post kidney transplant and stent placement.    POSTOPERATIVE DIAGNOSES:  Status post kidney transplant, stent placement, and   urinary tract infection.    OPERATION PERFORMED:  Canceled cystoscopy.    PROCEDURE IN DETAIL:  This patient was scheduled to have cystoscopy and stent   removal.  Her urine appeared infected preoperatively; therefore, the procedure   was canceled.  Urine culture was sent.  She will be treated with   culture-sensitive antibiotics and stent removal will be rescheduled for the next   week or two.  The patient will be called with appropriate antibiotics.      LACHO  dd: 02/11/2019 13:37:19 (CST)  td: 02/11/2019 17:24:16 (CST)  Doc ID   #[id number]  Job ID #[id number]    CC:

## 2019-02-12 NOTE — HPI
Reason for Consult: Management of T2DM, Hyperglycemia     Surgical Procedure and Date: OKT x1 on 01/14/2019    Diabetes diagnosis year:     2005    Home Diabetes Medications:    Basaglar 28 units HS    Januvia 100 mg daily    Low Dose SQ Insulin Correction Scale as needed with meals    How often checking glucose at home? 1-3 x day   BG readings on regimen: low 200's  Hypoglycemia on the regimen?  No  Missed doses on regimen?  No    Diabetes Complications include:     Hyperglycemia and Diabetic peripheral neuropathy     Complicating diabetes co morbidities:   Thyroid disease and Obesity      HPI:   Patient is a 58 y.o. female with a diagnosis of ESRD 2/2 DM II  now s/p LURT 1/14/19 (Campath induction, CMV D+/R-, TIT 58 min). Immunosuppression per Campath induction protocol. She is a CMV study participant. Surgery was uncomplicated. ED work up negative for acute cardiac event. Endocrinology consulted to manage hyperglycemia/DM2.

## 2019-02-12 NOTE — ASSESSMENT & PLAN NOTE
BG goal 140-180    Start Levemir 26 units HS  Start Januvia 100 mg daily  Start Low Dose SQ Insulin Correction Scale PRN TIDWM    Discharge Recommendations:  Continue home regimen.   [place] [facility] [place]

## 2019-02-12 NOTE — PROGRESS NOTES
"Diabetes Education  Author: Komal Huff RD  Date: 1/23/2019    Diabetes Care Management Summary  Diabetes Education Record Assessment: Initial (hospital d/c)    Current Diabetes Risk Level: Moderate (A1c normal, BG's elevated post tx)       Diabetes Type : Type II  Diabetes Diagnosis: >10 years      Current Treatment: Insulin, Oral Medication   Basaglar 16 units nightly;   Januvia 100 mg daily        Reviewed Problem List with Patient: Yes    Health Maintenance was reviewed today with patient. Discussed with patient importance of routine eye exams, foot exams/foot care, blood work (i.e.: A1c, microalbumin, and lipid), dental visits, yearly flu vaccine, and pneumonia vaccine as indicated by PCP. Patient verbalized understanding.     Health Maintenance Topics with due status: Not Due       Topic Last Completion Date    TETANUS VACCINE 07/12/2017    Colonoscopy 09/06/2017    Eye Exam 05/16/2018    Foot Exam 07/02/2018    Pap Smear with HPV Cotest 09/04/2018    Mammogram 09/10/2018    Lipid Panel 01/03/2019    Hemoglobin A1c 02/04/2019     Health Maintenance Due   Topic Date Due    Pneumococcal Vaccine (Highest Risk) (2 of 3 - PPSV23) 05/10/2017       Nutrition  Meal Planning: (appetite "okay;")  Meal Plan 24 Hour Recall - Dinner: (baked chicken, okra)    Monitoring   Self Monitoring : (SMBG 1-3 times daily)  Blood Glucose Logs: No  Do you use a personal continuous glucose monitor?: No  In the last month, how often have you had a low blood sugar reaction?: never    Exercise    Exercise Type: none      Social History  Preferred Learning Method: Face to Face  Primary Support: Self, Family  Smoking Status: Never a Smoker  Alcohol Use: Never  Barriers to Change: None  Learning Challenges : None  Readiness to Learn : Acceptance  Cultural Influences: No        Diabetes Education Assessment/Progress  Diabetes Disease Process (diabetes disease process and treatment options): Discussion, Instructed, Individual Session, " Written Materials Provided  Nutrition (Incorporating nutritional management into one's lifestyle): Discussion, Instructed, Individual Session, Written Materials Provided  Physical Activity (incorporating physical activity into one's lifestyle): Discussion, Instructed, Individual Session, Written Materials Provided  Medications (states correct name, dose, onset, peak, duration, side effects & timing of meds): Discussion, Instructed, Individual Session, Written Materials Provided  Monitoring (monitoring blood glucose/other parameters & using results): Discussion, Instructed, Individual Session, Written Materials Provided  Acute Complications (preventing, detecting, and treating acute complications): Discussion, Instructed, Individual Session, Written Materials Provided  Chronic Complications (preventing, detecting, and treating chronic complications): Discussion, Instructed, Individual Session, Written Materials Provided  Clinical (diabetes, other pertinent medical history, and relevant comorbidities reviewed during visit): Discussion; s/p OKTx 1/14/19  Cognitive (knowledge of self-management skills, functional health literacy): Individual Session  Psychosocial (emotional response to diabetes): Individual Session  Diabetes Distress and Support Systems: Individual Session  Behavioral (readiness for change, lifestyle practices, self-care behaviors): Individual Session        Goals  Patient has selected/evaluated goals during today's session: Yes, selected    Other: Set (Send in BG logs ASAP)  Start Date: 01/23/19         Diabetes Care Plan/Intervention  Education Plan/Intervention: Individual Follow-Up DSMT      Diabetes Meal Plan  Restrictions: Restricted Carbohydrate, Low Sodium        Today's Self-Management Care Plan was developed with the patient's input and is based on barriers identified during today's assessment.    The long and short-term goals in the care plan were written with the patient/caregiver's input.  The patient has agreed to work toward these goals to improve her overall diabetes control.      The patient received a copy of today's self-management plan and verbalized understanding of the care plan, goals, and all of today's instructions.      The patient was encouraged to communicate with her physician and care team regarding her condition(s) and treatment.  I provided the patient with my contact information today and encouraged her to contact me via phone or patient portal as needed.         Education Units of Time   Time Spent: 60 min

## 2019-02-12 NOTE — SUBJECTIVE & OBJECTIVE
Scheduled Meds:   atorvastatin  40 mg Oral Daily    atovaquone  1,500 mg Oral Daily    ciprofloxacin HCl  500 mg Oral Q12H    heparin (porcine)  5,000 Units Subcutaneous Q8H    insulin detemir U-100  22 Units Subcutaneous QHS    levothyroxine  75 mcg Oral Daily    lidocaine (PF) 10 mg/ml (1%)  1 mL Other Once    LORazepam  1 mg Oral BID    magnesium oxide  800 mg Oral Daily    pantoprazole  40 mg Oral Daily    tacrolimus  1.5 mg Oral BID    vilazodone  40 mg Oral Daily     Continuous Infusions:  PRN Meds:    Review of Systems   Constitutional: Positive for activity change (decreased) and fatigue (worsening). Negative for appetite change, chills, fever and unexpected weight change.   HENT: Negative for postnasal drip.    Eyes: Negative for visual disturbance.   Respiratory: Positive for shortness of breath (worse w exertion). Negative for cough and wheezing.    Cardiovascular: Negative for chest pain, palpitations and leg swelling.   Gastrointestinal: Positive for nausea. Negative for abdominal distention, abdominal pain, anal bleeding, blood in stool, constipation, diarrhea, rectal pain and vomiting.   Genitourinary: Negative for decreased urine volume, difficulty urinating, dysuria and hematuria.   Musculoskeletal: Negative for arthralgias.   Skin: Positive for wound (RLQ surgical). Negative for rash.   Allergic/Immunologic: Positive for immunocompromised state.   Neurological: Positive for weakness. Negative for dizziness, light-headedness and headaches.   Psychiatric/Behavioral: The patient is nervous/anxious.      Objective:     Vital Signs (Most Recent):  Temp: 98.3 °F (36.8 °C) (02/12/19 1131)  Pulse: 96 (02/12/19 1131)  Resp: 18 (02/12/19 1131)  BP: 123/60 (02/12/19 1131)  SpO2: 96 % (02/12/19 1131) Vital Signs (24h Range):  Temp:  [98.2 °F (36.8 °C)-99.1 °F (37.3 °C)] 98.3 °F (36.8 °C)  Pulse:  [] 96  Resp:  [16-20] 18  SpO2:  [88 %-98 %] 96 %  BP: (110-165)/(53-76) 123/60     Weight: 97.1  kg (214 lb 1.1 oz)  Body mass index is 33.53 kg/m².    Intake/Output - Last 3 Shifts       02/10 0700 - 02/11 0659 02/11 0700 - 02/12 0659 02/12 0700 - 02/13 0659           Urine Occurrence   1 x    Stool Occurrence  0 x 0 x          Physical Exam   Constitutional: She is oriented to person, place, and time. She appears well-developed.   HENT:   Head: Normocephalic and atraumatic.   Eyes: Pupils are equal, round, and reactive to light. No scleral icterus.   Neck: Normal range of motion. Neck supple.   Cardiovascular: Normal rate and regular rhythm.   Murmur heard.  Pulmonary/Chest: Effort normal and breath sounds normal. No respiratory distress.   Abdominal: Soft. Bowel sounds are normal. She exhibits no distension. There is no tenderness.   Musculoskeletal: She exhibits no edema.   Neurological: She is alert and oriented to person, place, and time.   Skin: Skin is warm and dry. Capillary refill takes 2 to 3 seconds. She is not diaphoretic.   RLQ surgical incision CDI w steri strips   Psychiatric: She has a normal mood and affect. Her behavior is normal. Judgment and thought content normal.       Laboratory:  Immunosuppressants         Stop Route Frequency     tacrolimus capsule 1.5 mg      -- Oral 2 times daily        CBC:   Recent Labs   Lab 02/12/19  0759   WBC 8.87   RBC 2.45*   HGB 7.2*   HCT 23.2*      MCV 95   MCH 29.4   MCHC 31.0*     CMP:   Recent Labs   Lab 02/11/19  1620 02/12/19  0427   * 214*   CALCIUM 9.0 9.1   ALBUMIN 3.5 3.4*   PROT 6.7  --     139   K 4.5 4.9   CO2 23 24    107   BUN 17 19   CREATININE 1.3 1.2   ALKPHOS 234*  --    ALT 27  --    AST 16  --    BILITOT 0.6  --      Labs within the past 24 hours have been reviewed.    Diagnostic Results:  I have personally reviewed all pertinent imaging studies.

## 2019-02-12 NOTE — ASSESSMENT & PLAN NOTE
-  EKG unremarkable for STEMI.  Serial troponin's negative  - CXR with no acute process  -Will obtain VQ scan to rule out PE along with bilateral lower extremity US to rule out DVT.

## 2019-02-12 NOTE — NURSING
Received report from Zhen Guillen RN in ED. Awaiting transfer of pt when appropriate. Will release blood upon arrival if not done before transfer.

## 2019-02-12 NOTE — PLAN OF CARE
Problem: Adult Inpatient Plan of Care  Goal: Plan of Care Review  Outcome: Ongoing (interventions implemented as appropriate)  Pt AAOx4, afebrile, free of falls. Pt ambulates with stand-by assistance. Pt received 1U PRBC upon arrival to unit. No transfusion reactions noted. Pt unsure if ever received previous transfusion. Denies pain/distress. Transplant incision clean, dry, intact; steri-strips in place, bruising at site. Denies any further complications since transplant in January 2019. Repeat h/h 7.3/24.1. Pt is still a participant in research study. JIE.

## 2019-02-13 ENCOUNTER — HOSPITAL ENCOUNTER (OUTPATIENT)
Dept: RADIOLOGY | Facility: HOSPITAL | Age: 59
Discharge: HOME | End: 2019-02-13
Attending: INTERNAL MEDICINE
Payer: MEDICARE

## 2019-02-13 ENCOUNTER — PATIENT MESSAGE (OUTPATIENT)
Dept: SPORTS MEDICINE | Facility: CLINIC | Age: 59
End: 2019-02-13

## 2019-02-13 VITALS
HEIGHT: 67 IN | RESPIRATION RATE: 18 BRPM | DIASTOLIC BLOOD PRESSURE: 57 MMHG | TEMPERATURE: 99 F | OXYGEN SATURATION: 94 % | SYSTOLIC BLOOD PRESSURE: 116 MMHG | BODY MASS INDEX: 33.6 KG/M2 | WEIGHT: 214.06 LBS | HEART RATE: 80 BPM

## 2019-02-13 PROBLEM — R06.09 EXERTIONAL DYSPNEA: Status: RESOLVED | Noted: 2018-04-02 | Resolved: 2019-02-13

## 2019-02-13 LAB
ALBUMIN SERPL BCP-MCNC: 3.3 G/DL
ANION GAP SERPL CALC-SCNC: 8 MMOL/L
ANISOCYTOSIS BLD QL SMEAR: SLIGHT
ANISOCYTOSIS BLD QL SMEAR: SLIGHT
BACTERIA UR CULT: NORMAL
BACTERIA UR CULT: NORMAL
BASOPHILS # BLD AUTO: ABNORMAL K/UL
BASOPHILS NFR BLD: 0 %
BASOPHILS NFR BLD: 2 %
BUN SERPL-MCNC: 14 MG/DL
CALCIUM SERPL-MCNC: 8.8 MG/DL
CHLORIDE SERPL-SCNC: 105 MMOL/L
CO2 SERPL-SCNC: 24 MMOL/L
CREAT SERPL-MCNC: 1.3 MG/DL
DIFFERENTIAL METHOD: ABNORMAL
DIFFERENTIAL METHOD: ABNORMAL
EOSINOPHIL # BLD AUTO: ABNORMAL K/UL
EOSINOPHIL NFR BLD: 0 %
EOSINOPHIL NFR BLD: 1 %
ERYTHROCYTE [DISTWIDTH] IN BLOOD BY AUTOMATED COUNT: 13.6 %
ERYTHROCYTE [DISTWIDTH] IN BLOOD BY AUTOMATED COUNT: 13.7 %
EST. GFR  (AFRICAN AMERICAN): 51.5 ML/MIN/1.73 M^2
EST. GFR  (NON AFRICAN AMERICAN): 44.7 ML/MIN/1.73 M^2
GLUCOSE SERPL-MCNC: 148 MG/DL
HAPTOGLOB SERPL-MCNC: 133 MG/DL
HCT VFR BLD AUTO: 24.1 %
HCT VFR BLD AUTO: 24.9 %
HGB BLD-MCNC: 7.5 G/DL
HGB BLD-MCNC: 8.1 G/DL
HYPOCHROMIA BLD QL SMEAR: ABNORMAL
HYPOCHROMIA BLD QL SMEAR: ABNORMAL
IMM GRANULOCYTES # BLD AUTO: ABNORMAL K/UL
IMM GRANULOCYTES # BLD AUTO: ABNORMAL K/UL
IMM GRANULOCYTES NFR BLD AUTO: ABNORMAL %
IMM GRANULOCYTES NFR BLD AUTO: ABNORMAL %
LDH SERPL L TO P-CCNC: 370 U/L
LYMPHOCYTES # BLD AUTO: ABNORMAL K/UL
LYMPHOCYTES NFR BLD: 11 %
LYMPHOCYTES NFR BLD: 3 %
MAGNESIUM SERPL-MCNC: 2 MG/DL
MCH RBC QN AUTO: 28.4 PG
MCH RBC QN AUTO: 30.1 PG
MCHC RBC AUTO-ENTMCNC: 31.1 G/DL
MCHC RBC AUTO-ENTMCNC: 32.5 G/DL
MCV RBC AUTO: 91 FL
MCV RBC AUTO: 93 FL
METAMYELOCYTES NFR BLD MANUAL: 2 %
MONOCYTES # BLD AUTO: ABNORMAL K/UL
MONOCYTES NFR BLD: 2 %
MONOCYTES NFR BLD: 6 %
MYELOCYTES NFR BLD MANUAL: 1 %
MYELOCYTES NFR BLD MANUAL: 2 %
NEUTROPHILS NFR BLD: 79 %
NEUTROPHILS NFR BLD: 90 %
NEUTS BAND NFR BLD MANUAL: 1 %
NRBC BLD-RTO: 1 /100 WBC
NRBC BLD-RTO: 1 /100 WBC
OVALOCYTES BLD QL SMEAR: ABNORMAL
OVALOCYTES BLD QL SMEAR: ABNORMAL
PATH REV BLD -IMP: NORMAL
PATH REV BLD -IMP: NORMAL
PHOSPHATE SERPL-MCNC: 3.7 MG/DL
PLATELET # BLD AUTO: 188 K/UL
PLATELET # BLD AUTO: 198 K/UL
PLATELET BLD QL SMEAR: ABNORMAL
PMV BLD AUTO: 10.4 FL
PMV BLD AUTO: 9.9 FL
POCT GLUCOSE: 150 MG/DL (ref 70–110)
POCT GLUCOSE: 178 MG/DL (ref 70–110)
POCT GLUCOSE: 188 MG/DL (ref 70–110)
POIKILOCYTOSIS BLD QL SMEAR: SLIGHT
POLYCHROMASIA BLD QL SMEAR: ABNORMAL
POLYCHROMASIA BLD QL SMEAR: ABNORMAL
POTASSIUM SERPL-SCNC: 4.4 MMOL/L
RBC # BLD AUTO: 2.64 M/UL
RBC # BLD AUTO: 2.69 M/UL
RETICS/RBC NFR AUTO: 0.5 %
SODIUM SERPL-SCNC: 137 MMOL/L
TACROLIMUS BLD-MCNC: 8.4 NG/ML
TROPONIN I SERPL DL<=0.01 NG/ML-MCNC: <0.006 NG/ML
WBC # BLD AUTO: 4.25 K/UL
WBC # BLD AUTO: 4.76 K/UL

## 2019-02-13 PROCEDURE — 85007 BL SMEAR W/DIFF WBC COUNT: CPT

## 2019-02-13 PROCEDURE — 25000003 PHARM REV CODE 250: Performed by: PHYSICIAN ASSISTANT

## 2019-02-13 PROCEDURE — 80069 RENAL FUNCTION PANEL: CPT

## 2019-02-13 PROCEDURE — 85060 PATHOLOGIST REVIEW: ICD-10-PCS | Mod: ,,, | Performed by: PATHOLOGY

## 2019-02-13 PROCEDURE — 84484 ASSAY OF TROPONIN QUANT: CPT

## 2019-02-13 PROCEDURE — 63600175 PHARM REV CODE 636 W HCPCS: Performed by: PHYSICIAN ASSISTANT

## 2019-02-13 PROCEDURE — 85045 AUTOMATED RETICULOCYTE COUNT: CPT

## 2019-02-13 PROCEDURE — 85027 COMPLETE CBC AUTOMATED: CPT

## 2019-02-13 PROCEDURE — 99239 PR HOSPITAL DISCHARGE DAY,>30 MIN: ICD-10-PCS | Mod: 24,,, | Performed by: PHYSICIAN ASSISTANT

## 2019-02-13 PROCEDURE — 83615 LACTATE (LD) (LDH) ENZYME: CPT

## 2019-02-13 PROCEDURE — 85060 BLOOD SMEAR INTERPRETATION: CPT | Mod: ,,, | Performed by: PATHOLOGY

## 2019-02-13 PROCEDURE — 83735 ASSAY OF MAGNESIUM: CPT

## 2019-02-13 PROCEDURE — 80197 ASSAY OF TACROLIMUS: CPT

## 2019-02-13 PROCEDURE — 99239 HOSP IP/OBS DSCHRG MGMT >30: CPT | Mod: 24,,, | Performed by: PHYSICIAN ASSISTANT

## 2019-02-13 PROCEDURE — 83010 ASSAY OF HAPTOGLOBIN QUANT: CPT

## 2019-02-13 PROCEDURE — 36415 COLL VENOUS BLD VENIPUNCTURE: CPT

## 2019-02-13 RX ORDER — AMOXICILLIN AND CLAVULANATE POTASSIUM 875; 125 MG/1; MG/1
1 TABLET, FILM COATED ORAL EVERY 12 HOURS
Qty: 12 TABLET | Refills: 0 | Status: SHIPPED | OUTPATIENT
Start: 2019-02-13 | End: 2019-02-19

## 2019-02-13 RX ORDER — PANTOPRAZOLE SODIUM 40 MG/1
40 TABLET, DELAYED RELEASE ORAL DAILY
Qty: 30 TABLET | Refills: 11 | Status: SHIPPED | OUTPATIENT
Start: 2019-02-14 | End: 2019-02-27

## 2019-02-13 RX ADMIN — HEPARIN SODIUM 5000 UNITS: 5000 INJECTION, SOLUTION INTRAVENOUS; SUBCUTANEOUS at 06:02

## 2019-02-13 RX ADMIN — PANTOPRAZOLE SODIUM 40 MG: 40 TABLET, DELAYED RELEASE ORAL at 08:02

## 2019-02-13 RX ADMIN — LORAZEPAM 1 MG: 1 TABLET ORAL at 08:02

## 2019-02-13 RX ADMIN — ATOVAQUONE 1500 MG: 750 SUSPENSION ORAL at 08:02

## 2019-02-13 RX ADMIN — ATORVASTATIN CALCIUM 40 MG: 20 TABLET, FILM COATED ORAL at 08:02

## 2019-02-13 RX ADMIN — CIPROFLOXACIN HYDROCHLORIDE 500 MG: 500 TABLET, FILM COATED ORAL at 08:02

## 2019-02-13 RX ADMIN — MAGNESIUM OXIDE TAB 400 MG (241.3 MG ELEMENTAL MG) 800 MG: 400 (241.3 MG) TAB at 08:02

## 2019-02-13 RX ADMIN — TACROLIMUS 1.5 MG: 1 CAPSULE ORAL at 08:02

## 2019-02-13 RX ADMIN — HEPARIN SODIUM 5000 UNITS: 5000 INJECTION, SOLUTION INTRAVENOUS; SUBCUTANEOUS at 01:02

## 2019-02-13 RX ADMIN — ERYTHROPOIETIN 20000 UNITS: 20000 INJECTION, SOLUTION INTRAVENOUS; SUBCUTANEOUS at 11:02

## 2019-02-13 RX ADMIN — VILAZODONE HYDROCHLORIDE 40 MG: 10 TABLET ORAL at 08:02

## 2019-02-13 RX ADMIN — LEVOTHYROXINE SODIUM 75 MCG: 75 TABLET ORAL at 06:02

## 2019-02-13 NOTE — SUBJECTIVE & OBJECTIVE
"Interval HPI:   Overnight events:    NAEON. BG is within goal with 20% reduction of home SQ insulin regimen along with home dose PO Januvia. Will continue to monitor for prandial excursions.     Eatin%  Nausea: No  Hypoglycemia and intervention: No  Fever: No  TPN and/or TF: No  If yes, type of TF/TPN and rate: None    /64 (BP Location: Right arm, Patient Position: Sitting)   Pulse 85   Temp 98.4 °F (36.9 °C) (Oral)   Resp 16   Ht 5' 7" (1.702 m)   Wt 97.1 kg (214 lb 1.1 oz)   LMP 2012   SpO2 (!) 94%   Breastfeeding? No   BMI 33.53 kg/m²     Labs Reviewed and Include    Recent Labs   Lab 19  0343   *   CALCIUM 8.8   ALBUMIN 3.3*      K 4.4   CO2 24      BUN 14   CREATININE 1.3     Lab Results   Component Value Date    WBC 4.76 2019    HGB 7.5 (L) 2019    HCT 24.1 (L) 2019    MCV 91 2019     2019     No results for input(s): TSH, FREET4 in the last 168 hours.  Lab Results   Component Value Date    HGBA1C 6.5 (H) 2019       Nutritional status:   Body mass index is 33.53 kg/m².  Lab Results   Component Value Date    ALBUMIN 3.3 (L) 2019    ALBUMIN 3.4 (L) 2019    ALBUMIN 3.5 2019     No results found for: PREALBUMIN    Estimated Creatinine Clearance: 55.1 mL/min (based on SCr of 1.3 mg/dL).    Accu-Checks  Recent Labs     19  1858 19  2157 19  0737 19  1158 19  2149 19  0314 19  0809   POCTGLUCOSE 256* 207* 233* 215* 159* 150* 178*       Current Medications and/or Treatments Impacting Glycemic Control  Immunotherapy:    Immunosuppressants         Stop Route Frequency     tacrolimus capsule 1.5 mg      -- Oral 2 times daily        Steroids:   Hormones (From admission, onward)    None        Pressors:    Autonomic Drugs (From admission, onward)    None        Hyperglycemia/Diabetes Medications:   Antihyperglycemics (From admission, onward)    Start     Stop Route " Frequency Ordered    02/12/19 2100  insulin detemir U-100 pen 26 Units      -- SubQ Nightly 02/12/19 1653    02/11/19 1819  insulin aspart U-100 pen 0-5 Units      -- SubQ Before meals & nightly PRN 02/11/19 2751

## 2019-02-13 NOTE — NURSING
Pt's IV was d/c'd; intact. Discharge paperwork reviewed with the pt; including, medication changes and doctor appointments. Pt stated she had all her personal belongings with her. Pt was transferred out the hospital via transport per w/c.

## 2019-02-13 NOTE — PLAN OF CARE
"Problem: Adult Inpatient Plan of Care  Goal: Plan of Care Review  Outcome: Ongoing (interventions implemented as appropriate)  Pt VSS, hypertensive while receiving blood; given IV hydralazine BP. Pt remains free of falls; noted to be lightheaded/ dizzy upon rising/ambulating. Pt orthostatic BP stable. Pt has no complaints of pain and no appetite.     Problem: Anemia  Goal: Anemia Symptom Improvement    Intervention: Monitor and Manage Anemia  Pt received one unit of PRBCs for H/h=7.2/23.2. Pt tolerated well and verbalized feeling better and "not as weak."        "

## 2019-02-13 NOTE — DISCHARGE SUMMARY
[facility]  Kidney Transplant  Discharge Summary    Patient Name: Andra Newell  MRN: [id number]  Admission Date: 2/11/2019  Hospital Length of Stay: 1 days  Discharge Date and Time:  02/13/2019 1:44 PM  Attending Physician: Kd Perea MD   Discharging Provider: Cheryl Winslow PA-C  Primary Care Provider: Gita Cohen MD    Hospital Course: Ms. Andra Newell is a 57 y/o F with hx of DM II, HTN, CKD 2/2 to DM/HTN is now s/p LURT 1/14/19 (Campath induction, CMV D+/R-, TIT 58 min). Immunosuppression per Campath induction protocol. She is a CMV study participant. Surgery was uncomplicated. She was recently admitted to the hospital for concern for stroke. She had unsteady gait and dizziness during this hospitalization but symptoms improved.MRI was attempted at that time. However, unable to be obtained inpatient as patient needs to be sedated for testing, which cannot be done inpatient. She was scheduled for MRI brain as an outpatient on 2/12. She was supposed to have outpatient cysto and ureteral stent removal on 2/11/19 but the procedure was cancelled secondary to suspicion for UTI. She then presented to the ED on 2/11/19 with complaints of SOB, chest pain, dysuria, and weakness. ED work up negative for acute cardiac event. Chest x-ray with no acute process. VQ scan unremarkable for PE, bilateral leg US negative for ED. Lab work in ED notable for acute drop in H/H. Patient with no hematochezia, melena. Stool in ED was negative for guaiac. Kidney US obtained which did show 2 fluid collections. Anemia work up unremarkable for significant hemolysis and with no evidence of active bleeding seen on CT A/P. She does have low saturated iron. She was transfused 2 units of PRBC while inpatient and given dose of Epo.  Infectious work up +Kleb Pneumo ESBL UTI. She was placed on antibiotics for UTI.    Of note, there was questions of whether the CMV study drugs could cause anemia as  patient has been off of MMF and Bactrim for a few days and reviewing her med list this would be the only other potential medication culprit. This was discussed with research team and they were agreeable to holding study drugs for a 3-4 days. She will follow up with the research team on Friday to further discuss restarting medications.     Patient's admission symptoms improved with blood transfusion and treatment of UTI. Possible that symptoms were related to symptomatic anemia vs infection vs medication induced. On day of discharge, patient feeling well. She is stable and ready for discharge. She will be discharged home on Augmentin for UTI with plan for 1 week treatment. She also be discharged home on oral iron and with plan for weekly Procrit injections. She will follow up with labs on Friday 2/15/19 (CMV PCR due with Friday labs) along with clinic appointment on Friday 2/15 with CMV research team. Patient verbalized her understanding prior to discharge.       * No surgery found *     No notes on file    Final Active Diagnoses:    Diagnosis Date Noted POA    PRINCIPAL PROBLEM:  Acute on chronic anemia [D64.9] 02/11/2019 Yes    Anemia [D64.9] 02/12/2019 Yes    Hypomagnesemia [E83.42] 02/12/2019 Yes    Long term current use of immunosuppressive drug [Z79.899] 02/06/2019 Not Applicable    At risk for opportunistic infections [Z91.89] 01/15/2019 Yes    Status post living-donor kidney transplantation [Z94.0] 01/15/2019 Not Applicable    Adverse effect of adrenal cortical steroids, sequela [T38.0X5S] 01/15/2019 Not Applicable    BMI 36.0-36.9,adult [Z68.36] 11/23/2018 Not Applicable    Gastroesophageal reflux disease without esophagitis [K21.9] 07/30/2018 Yes    Dysuria [R30.0] 04/02/2018 Yes    Exertional dyspnea [R06.09] 04/02/2018 Yes    Prophylactic immunotherapy [Z29.8] 09/06/2017 Not Applicable    Fatigue [R53.83]  Yes    Hypothyroidism [E03.9] 03/27/2017 Yes    Depression [F32.9] 01/21/2015 Yes     Uncontrolled type 2 diabetes mellitus, with long-term current use of insulin [E11.65, Z79.4] 09/27/2013 Not Applicable      Problems Resolved During this Admission:       Treatments: As above.    Consults (From admission, onward)        Status Ordering Provider     Inpatient consult to Endocrinology  Once     Provider:  (Not yet assigned)    Completed CANDY PAL     Inpatient consult to Kidney/Pancreas Transplant Medicine  Once     Provider:  (Not yet assigned)    Completed PALCANDY          Pending Diagnostic Studies:     Procedure Component Value Units Date/Time    Tacrolimus level [[id number]] Collected:  02/12/19 0427    Order Status:  Sent Lab Status:  In process Updated:  02/12/19 0427    Specimen:  Blood         Significant Diagnostic Studies: Labs:   CMP   Recent Labs   Lab 02/11/19  1620 02/12/19 0427 02/13/19  0343    139 137   K 4.5 4.9 4.4    107 105   CO2 23 24 24   * 214* 148*   BUN 17 19 14   CREATININE 1.3 1.2 1.3   CALCIUM 9.0 9.1 8.8   PROT 6.7  --   --    ALBUMIN 3.5 3.4* 3.3*   BILITOT 0.6  --   --    ALKPHOS 234*  --   --    AST 16  --   --    ALT 27  --   --    ANIONGAP 10 8 8   ESTGFRAFRICA 51.5* 56.8* 51.5*   EGFRNONAA 44.7* 49.2* 44.7*    and CBC   Recent Labs   Lab 02/12/19  0759 02/13/19  0343 02/13/19  1015   WBC 8.87 4.76 4.25   HGB 7.2* 7.5* 8.1*   HCT 23.2* 24.1* 24.9*    198 188       Discharged Condition: good    Disposition: Home or Self Care    Follow Up:    Patient Instructions:      Diet Adult Regular     Order Specific Question Answer Comments   Additional restrictions: Low Potassium      Notify your health care provider if you experience any of the following:  temperature >100.4     Notify your health care provider if you experience any of the following:  persistent nausea and vomiting or diarrhea     Notify your health care provider if you experience any of the following:  severe uncontrolled pain     Notify your health care provider  "if you experience any of the following:  redness, tenderness, or signs of infection (pain, swelling, redness, odor or green/yellow discharge around incision site)     Notify your health care provider if you experience any of the following:  difficulty breathing or increased cough     Notify your health care provider if you experience any of the following:  severe persistent headache     Notify your health care provider if you experience any of the following:  worsening rash     Notify your health care provider if you experience any of the following:  persistent dizziness, light-headedness, or visual disturbances     Notify your health care provider if you experience any of the following:  increased confusion or weakness     Notify your health care provider if you experience any of the following:   Order Comments: For any other concerning signs or symptoms.     Activity as tolerated     Medications:  Reconciled Home Medications:      Medication List      START taking these medications    amoxicillin-clavulanate 875-125mg 875-125 mg per tablet  Commonly known as:  AUGMENTIN  Take 1 tablet by mouth every 12 (twelve) hours. for 6 days     pantoprazole 40 MG tablet  Commonly known as:  PROTONIX  Take 1 tablet (40 mg total) by mouth once daily.  Start taking on:  2/14/2019        CHANGE how you take these medications    LORazepam 2 MG Tab  Commonly known as:  ATIVAN  Take 1 tablet (2 mg total) by mouth 2 (two) times daily.  What changed:  how much to take        CONTINUE taking these medications    atorvastatin 40 MG tablet  Commonly known as:  LIPITOR  TAKE ONE TABLET BY MOUTH ONCE DAILY     atovaquone 750 mg/5 mL Susp  Commonly known as:  MEPRON  Take 10 mLs (1,500 mg total) by mouth once daily. Stop: 1/14/20     BD ULTRA-FINE MINI PEN NEEDLE 31 gauge x 3/16" Ndle  Generic drug:  pen needle, diabetic  USE ONE NEEDLE ONCE DAILY     insulin aspart U-100 100 unit/mL Inpn pen  Commonly known as:  NovoLOG Flexpen U-100 " Insulin  Inject 1-5 Units into the skin 3 (three) times daily with meals. 180 - 230 + 1 unit  231- 280  + 2 units  281 - 330 + 3 units  331 - 380 + 4 units      > 380   + 5 units     insulin glargine 100 units/mL (3mL) SubQ pen  Commonly known as:  BASAGLAR KWIKPEN U-100 INSULIN  Inject 25 Units into the skin every evening.     JANUVIA 100 MG Tab  Generic drug:  SITagliptin  Take 1 tablet (100 mg total) by mouth once daily.     levothyroxine 75 MCG tablet  Commonly known as:  SYNTHROID  TAKE ONE TABLET BY MOUTH ONCE DAILY     magnesium oxide 400 mg (241.3 mg magnesium) tablet  Commonly known as:  MAG-OX  Take 2 tablets (800 mg total) by mouth once daily.     multivitamin tablet  Commonly known as:  THERAGRAN  Take 1 tablet by mouth once daily.     tacrolimus 0.5 MG Cap  Commonly known as:  PROGRAF  Take 3 capsules (1.5 mg total) by mouth every morning AND 3 capsules (1.5 mg total) every evening. Z94.0 kidney transplant.     VIIBRYD 40 mg Tab tablet  Generic drug:  vilazodone  TAKE ONE TABLET BY MOUTH ONCE DAILY     VITAMIN D2 50,000 unit Cap  Generic drug:  ergocalciferol  Take 1 capsule (50,000 Units total) by mouth every 7 days. Take on Tues        STOP taking these medications    famotidine 20 MG tablet  Commonly known as:  PEPCID     INV acyclovir/placebo 400 MG capsule     INV MK-8228/placebo 480 mg oral tablet     INV valganciclovir/placebo 450 mg tablet          Time spent caring for patient (Greater than 1/2 spent in direct face-to-face contact): > 30 minutes    Cheryl Winslow PA-C  Kidney Transplant  [facility]

## 2019-02-13 NOTE — PLAN OF CARE
Problem: Adult Inpatient Plan of Care  Goal: Plan of Care Review  Outcome: Ongoing (interventions implemented as appropriate)  Pt AAOx4, afebrile, free of falls. Accu-checks provided HS and 0200, no additional coverage required. Pt denies pain/distress. Rested well overnight. Closely monitoring H/H this morning. GRACIE.

## 2019-02-13 NOTE — CARE UPDATE
BG goal 140-180    NAEON. BG is within goal with 20% reduction of home SQ insulin regimen along with home dose PO Januvia. Will continue to monitor for prandial excursions.     Continue Levemir 26 units HS  Continue Januvia 100 mg daily  Continue Low Dose SQ Insulin Correction Scale PRN TIDWM    Discharge Recommendations:  Continue home regimen.   Patient has scheduled appointment with [facility] [place]

## 2019-02-13 NOTE — PROGRESS NOTES
Admit Note     Met with patient to assess needs. Patient is a 58 y.o. is in committed relationship with Any  female, admitted for Dizziness [R42]  History of kidney transplant [Z94.0]  Chest pain [R07.9]  Anemia, unspecified type [D64.9]  Anemia, unspecified type [D64.9]    Patient admitted from home on 2/11/2019 .  At this time, patient presents as alert and oriented x 4, pleasant, good eye contact, well groomed, recall good, concentration/judgement good, average intelligence, calm, communicative, cooperative and asking and answering questions appropriately.  At this time, patients caregiver presents as was not present at this time. .    Household/Family Systems     Patient resides with patient's significant other, Any at [address] [place] [address].  Support system includes s/o, two sons and friend Ashleigh.  Patient does not have dependents that are need of being cared for.     Patients primary caregiver is Any , servando significant other, phone number [phone].  Confirmed patients contact information is [phone] (home);   Telephone Information:   Mobile [phone]   .    During admission, patient's caregiver plans to stay at home.  Confirmed patient and patients caregivers do have access to reliable transportation.    Cognitive Status/Learning     Patient reports reading ability as 12th grade and states patient does have difficulty with seeing and utilizes glasses .  Patient reports patient learns best by hands-on instruction .   Needed: No.   Highest education level: High School (9-12) or GED    Vocation/Disability   .  Working for Income: No  If no, reason not working: Disability  Patient is disabled due to work injury  since 2015.  Prior to disability, patient  was employed as a Rec Supervisor at [employer] .    Adherence     Patient reports a high level of adherence to patients health care regimen.  Adherence counseling and education provided. Patient  verbalizes understanding.    Substance Use    Patient reports the following substance usage.    Tobacco: none, patient denies any use.  Alcohol: none, patient denies any use.  Illicit Drugs/Non-prescribed Medications: none, patient denies any use.  Patient states clear understanding of the potential impact of substance use.  Substance abstinence/cessation counseling, education and resources provided and reviewed.     Services Utilizing/ADLS    Infusion Service: Prior to admission, patient utilizing? no  Home Health: Prior to admission, patient utilizing? no  DME: Prior to admission, yes scooter for long distances  Pulmonary/Cardiac Rehab: Prior to admission, no  Dialysis:  Prior to admission, no  Transplant Specialty Pharmacy:  Prior to admission, yes; [facility] .    Prior to admission, patient reports patient was independent with ADLS and was not driving.  Patient reports patient is not able to care for self at this time due to compromised medical condition (as documented in medical record) and physical weakness..  Patient indicates a willingness to care for self once medically cleared to do so.    Insurance/Medications    Insured by   Payor/Plan Subscr  Sex Relation Sub. Ins. ID Effective Group Num   1. Contactual WORKERS * ANGELAROQUE NUNEZ 1902 Female  [id number]                                    [address] [facility] [address]   2. MEDICARE - [place]* VIRGIE DUKE * 1960 Female  [id number]                                    [address]      Primary Insurance (for UNOS reporting): Public Insurance - Medicare FFS (Fee For Service)  Secondary Insurance (for UNOS reporting): Private Insurance    Patient reports patient is able to obtain and afford medications at this time and at time of discharge.    Living Will/Healthcare Power of     Patient states patient does not have a LW and/or HCPA.   provided education regarding LW and HCPA and the completion  of forms.    Coping/Mental Health    Patient is coping adequately with the aid of  family members and friends.  Patient denies mental health difficulties at this present time. Pt does have a history of SI after work injury in 2015 and was hospitalized. Pt is currently followed by Dr. Sheth for depression. Pt utilizes Lorazepam and Vilbryd daily and reports as stable.     Discharge Planning    At time of discharge, patient plans check into patient's home under the care of Any.  Patients significant other will transport patient.  Per rounds today, expected discharge date is tomorrow . Patient and patients caregiver  verbalize understanding and are involved in treatment planning and discharge process.    Additional Concerns    Patient's caretaker denies additional needs and/or concerns at this time.  providing ongoing psychosocial support, education, resources and d/c planning as needed.  SW remains available. Patient's caregiver verbalizes understanding and agreement with information reviewed,  availability and how to access available resources as needed. Patient denies additional needs and/or concerns at this time. Patient verbalizes understanding and agreement with information reviewed, social work availability, and how to access available resources as needed.

## 2019-02-13 NOTE — ASSESSMENT & PLAN NOTE
BG goal 140-180    Continue Levemir 26 units HS  Continue Januvia 100 mg daily  Continue Low Dose SQ Insulin Correction Scale PRN TIDWM    Discharge Recommendations:  Continue home regimen.   Patient [place] [facility] [place]

## 2019-02-13 NOTE — PROGRESS NOTES
"Discharge Medication Note:    Hospital Course:  presented to the ED on 2/11/19 with complaints of SOB, chest pain, dysuria, and weakness.Lab work in ED notable for acute drop in H/H. Infectious work up + UTI. She was placed on antibiotics for UTI.She will be discharged home on Augmentin for UTI with plan for 1 week treatment. She also be discharged home on oral iron and with plan for weekly Procrit injections. She will follow up with labs on Friday 2/15/19 (CMV PCR due with Friday labs) along with clinic appointment on Friday 2/15 with CMV research team    Met with Andra Newell at discharge to review discharge medications and to update the blue medication card.       Andra Newell   Home Medication Instructions MARICARMEN:[id number]    Printed on:02/13/19 1102   Medication Information                      amoxicillin-clavulanate 875-125mg (AUGMENTIN) 875-125 mg per tablet  Take 1 tablet by mouth every 12 (twelve) hours. for 6 days             atorvastatin (LIPITOR) 40 MG tablet  TAKE ONE TABLET BY MOUTH ONCE DAILY             atovaquone (MEPRON) 750 mg/5 mL Susp  Take 10 mLs (1,500 mg total) by mouth once daily. Stop: 1/14/20             BD ULTRA-FINE MINI PEN NEEDLE 31 gauge x 3/16" Ndle  USE ONE NEEDLE ONCE DAILY             ergocalciferol (ERGOCALCIFEROL) 50,000 unit Cap  Take 1 capsule (50,000 Units total) by mouth every 7 days. Take on Tues             ferrous sulfate, dried (SLOW FE) 160 mg (50 mg iron) TbSR  Take 1 tablet (160 mg total) by mouth once daily.             insulin (BASAGLAR KWIKPEN U-100 INSULIN) glargine 100 units/mL (3mL) SubQ pen  Inject 25 Units into the skin every evening.             insulin aspart U-100 (NOVOLOG FLEXPEN U-100 INSULIN) 100 unit/mL InPn pen  Inject 1-5 Units into the skin 3 (three) times daily with meals. 180 - 230 + 1 unit  231- 280  + 2 units  281 - 330 + 3 units  331 - 380 + 4 units      > 380   + 5 units             levothyroxine (SYNTHROID) 75 MCG tablet  TAKE " ONE TABLET BY MOUTH ONCE DAILY             LORazepam (ATIVAN) 2 MG Tab  Take 1 tablet (2 mg total) by mouth 2 (two) times daily.             magnesium oxide (MAG-OX) 400 mg (241.3 mg magnesium) tablet  Take 2 tablets (800 mg total) by mouth once daily.             multivitamin (THERAGRAN) tablet  Take 1 tablet by mouth once daily.             pantoprazole (PROTONIX) 40 MG tablet  Take 1 tablet (40 mg total) by mouth once daily.             SITagliptin (JANUVIA) 100 MG Tab  Take 1 tablet (100 mg total) by mouth once daily.             tacrolimus (PROGRAF) 0.5 MG Cap  Take 3 capsules (1.5 mg total) by mouth every morning AND 3 capsules (1.5 mg total) every evening. Z94.0 kidney transplant.             VIIBRYD 40 mg Tab tablet  TAKE ONE TABLET BY MOUTH ONCE DAILY                 Pt was provided with prescriptions for the following meds:  Augmentin, iron, and protonix    The following medications have been placed on HOLD and should be restarted in the outpatient setting (when appropriate): Currently holding MMF and Investigational drugs because of low H&H.    Andra Newell verbalized understanding and had the opportunity to ask questions.

## 2019-02-15 ENCOUNTER — TELEPHONE (OUTPATIENT)
Dept: TRANSPLANT | Facility: CLINIC | Age: 59
End: 2019-02-15

## 2019-02-15 ENCOUNTER — LAB VISIT (OUTPATIENT)
Dept: LAB | Facility: HOSPITAL | Age: 59
End: 2019-02-15
Attending: INTERNAL MEDICINE
Payer: MEDICARE

## 2019-02-15 ENCOUNTER — RESEARCH ENCOUNTER (OUTPATIENT)
Dept: RESEARCH | Facility: HOSPITAL | Age: 59
End: 2019-02-15
Payer: MEDICAID

## 2019-02-15 DIAGNOSIS — Z94.0 KIDNEY REPLACED BY TRANSPLANT: ICD-10-CM

## 2019-02-15 DIAGNOSIS — Z00.6 RESEARCH STUDY PATIENT: ICD-10-CM

## 2019-02-15 DIAGNOSIS — Z94.0 STATUS POST LIVING-DONOR KIDNEY TRANSPLANTATION: ICD-10-CM

## 2019-02-15 DIAGNOSIS — Z94.0 STATUS POST LIVING-DONOR KIDNEY TRANSPLANTATION: Primary | ICD-10-CM

## 2019-02-15 LAB
ALBUMIN SERPL BCP-MCNC: 3.8 G/DL
ALBUMIN SERPL BCP-MCNC: 3.8 G/DL
ALP SERPL-CCNC: 179 U/L
ALT SERPL W/O P-5'-P-CCNC: 40 U/L
ANION GAP SERPL CALC-SCNC: 10 MMOL/L
AST SERPL-CCNC: 32 U/L
BASOPHILS # BLD AUTO: 0.02 K/UL
BASOPHILS NFR BLD: 0.4 %
BILIRUB DIRECT SERPL-MCNC: 0.3 MG/DL
BILIRUB SERPL-MCNC: 0.8 MG/DL
BUN SERPL-MCNC: 20 MG/DL
CALCIUM SERPL-MCNC: 9.5 MG/DL
CHLORIDE SERPL-SCNC: 105 MMOL/L
CO2 SERPL-SCNC: 25 MMOL/L
CREAT SERPL-MCNC: 1.3 MG/DL
DIFFERENTIAL METHOD: ABNORMAL
EOSINOPHIL # BLD AUTO: 0.1 K/UL
EOSINOPHIL NFR BLD: 2.5 %
ERYTHROCYTE [DISTWIDTH] IN BLOOD BY AUTOMATED COUNT: 13.2 %
EST. GFR  (AFRICAN AMERICAN): 51.5 ML/MIN/1.73 M^2
EST. GFR  (NON AFRICAN AMERICAN): 44.7 ML/MIN/1.73 M^2
GLUCOSE SERPL-MCNC: 223 MG/DL
HCT VFR BLD AUTO: 27.7 %
HGB BLD-MCNC: 8.6 G/DL
IMM GRANULOCYTES # BLD AUTO: 0.16 K/UL
IMM GRANULOCYTES NFR BLD AUTO: 3.1 %
LYMPHOCYTES # BLD AUTO: 0.2 K/UL
LYMPHOCYTES NFR BLD: 3.9 %
MAGNESIUM SERPL-MCNC: 1.6 MG/DL
MCH RBC QN AUTO: 28.4 PG
MCHC RBC AUTO-ENTMCNC: 31 G/DL
MCV RBC AUTO: 91 FL
MONOCYTES # BLD AUTO: 0.5 K/UL
MONOCYTES NFR BLD: 9.3 %
NEUTROPHILS # BLD AUTO: 4.2 K/UL
NEUTROPHILS NFR BLD: 80.8 %
NRBC BLD-RTO: 0 /100 WBC
PHOSPHATE SERPL-MCNC: 3.6 MG/DL
PLATELET # BLD AUTO: 203 K/UL
PMV BLD AUTO: 10.5 FL
POTASSIUM SERPL-SCNC: 4.8 MMOL/L
PROT SERPL-MCNC: 7.2 G/DL
PTH-INTACT SERPL-MCNC: 180 PG/ML
RBC # BLD AUTO: 3.03 M/UL
SODIUM SERPL-SCNC: 140 MMOL/L
TACROLIMUS BLD-MCNC: 7.9 NG/ML
URATE SERPL-MCNC: 7.3 MG/DL
WBC # BLD AUTO: 5.17 K/UL

## 2019-02-15 PROCEDURE — 83735 ASSAY OF MAGNESIUM: CPT

## 2019-02-15 PROCEDURE — 85025 COMPLETE CBC W/AUTO DIFF WBC: CPT

## 2019-02-15 PROCEDURE — 83970 ASSAY OF PARATHORMONE: CPT

## 2019-02-15 PROCEDURE — 80197 ASSAY OF TACROLIMUS: CPT

## 2019-02-15 PROCEDURE — 84075 ASSAY ALKALINE PHOSPHATASE: CPT

## 2019-02-15 PROCEDURE — 87799 DETECT AGENT NOS DNA QUANT: CPT

## 2019-02-15 PROCEDURE — 84550 ASSAY OF BLOOD/URIC ACID: CPT

## 2019-02-15 PROCEDURE — 80069 RENAL FUNCTION PANEL: CPT

## 2019-02-15 RX ORDER — CIPROFLOXACIN 500 MG/1
500 TABLET ORAL ONCE
Status: CANCELLED | OUTPATIENT
Start: 2019-02-15 | End: 2019-02-15

## 2019-02-15 NOTE — TELEPHONE ENCOUNTER
----- Message from Maureen Cabral MD sent at 2/15/2019  1:26 PM CST -----  The following message sent to patient via [facility]: Kidney #s look fine. Have you had issues with gout before? If not, I would not do anything about slight uric acid elevation.

## 2019-02-15 NOTE — PROGRESS NOTES
[facility] CMV Prevention (Kidney Transplant)  Protocol: [id number]  IRB# 2017.512  PI: Nicholas GUAN)  Site: [id number]  Subject #[id number]     VISIT 5 / WEEK 4:       Date of Visit: 15/FEB/2019     Patient arrived at clinic to complete his V3-Wk1 visit. Protocol Labs were drawn in morning at 08:25 AM before study medication was taken from current study medication bottles. All specimens processed and shipped per protocol. Byron SARAVIA conducted visti in its entirety with Sub-I, Sharri Sands present.. Patient agrees to continue his participation in the study and all questions answered.     Two AE's following. JOHN dating 03/FEB/2019 and JOHN dating 11/FEB/2019. Both dealt with dizziness/fatigue. Patient states she had low fatigue and no energy, but not really dizzy. Do to low hemoglobin, subject was put on hold from study medication starting 12/FEB/2019. Reassess on Monday 18/FEB/2019.     Vital signs obtained sitting:  BP: 109/70  HR: 100  RR: 18  Wt: 98.6 kg  T: 98.3 F oral     Follow-up in 2 week for Week 6 visit. Informed to bring back bottles, old study medication, and dairy. Routine instructions on taking study medications and restrictions provided.    CMV Disease Assessment, Health Outcomes Assessment, Renal Transplant Outcomes, Health Outcomes Assessment and Child Pierre Score completed. Pt agreed to continue to follow compliance in regards to effective birth control and donation of sperm as required by protocol.       Diary collected and reviewed with subject. Pt stated that he took medications every day and did not miss any days. Pt stated no loss of drug. Current diary collected. New diary dispensed.      Pill bottles were brought to visit and collected. Drug returned:   MK-8228/Placebo -     6  VGCV/Placebo -         12  ACV/Placebo -            12     Old bottles returned to research pharmacy.  re-educated the patient on the importance of bringing all bottles back into next visit.  Subject was assigned new study drug bottles.       New study medication (3 bottles) dispensed to subject: [id number], [id number], [id number]. All 3 study medication taken in clinic from new bottles.     Detailed instructions to bring ALL bottles back to the site. Education and training provided on when to take study drug, and what not to eat/drink while taking study medication, etc.      Study participant reminded to call  for any questions. Reminder to bring diary and all pill bottles back to next appointment on 01/MAR/2019. However, subject will be back on 18/FEB/2019 for an UNS visit to assess SAEs.

## 2019-02-16 LAB
BACTERIA BLD CULT: NORMAL
BACTERIA BLD CULT: NORMAL

## 2019-02-16 NOTE — PROGRESS NOTES
Kidney Post-Transplant Assessment    Referring Physician: Dalton Heaton  Current Nephrologist: Dalton Heaton    ORGAN: LEFT KIDNEY  Donor Type: living  PHS Increased Risk: no  Cold Ischemia: 37 mins  Induction Medications: campath - alemtuzumab (anti-cd52)    Subjective:     CC:  Reassessment of renal allograft function and management of chronic immunosuppression.    HPI:  Ms. Newell is a 59 y.o. year old White female who received a living kidney transplant on 1/14/19. Her most recent creatinine is 1.1. She takes mycophenolate mofetil and tacrolimus for maintenance immunosuppression. Her post transplant course has been uncomplicated to date.    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 She briefly required HD 4/13/18 until ~Sept 2018.  she was predialysis at the time of transplant.   -LURD KT (friend) 01/14/2019,  Induced with Campath.  CMV  Mismatch -study participant.  -01/24/2019 hyperkalemia, Bactrim discontinued  - +Kleb Pneumo ESBL UTI  -Admit for unsteadiness and dizziness with work up WNL, MRI pending (r/s d/t hosp admit)    PCP PROPHYLAXIS: Until 1/14/19;  Bactrim stopped 01/24/2019 D/T hiK  CMV PROPHYLAXIS: Valcyte until 7/14/19  FUNGAL PROPHYLAXIS: None    Andra was recently admitted twice to the hospital. First was for concern for stroke. She had unsteady gait and dizziness during this hospitalization but symptoms improved.MRI was attempted at that time. However, unable to be obtained inpatient as patient needs to be sedated for testing, which cannot be done inpatient.  Her 2nd admission was for dyspnea, chest pain, dysuria and weakness.  V/Q, echo, and lower extremity ultrasound were negative.  She was determined to have Klebsiella pneumoniae ESBL UTI.  She was also noted to have significant anemia, causing the CMV study medications to be discontinued.  She required blood transfusion.   She was discharged to complete a  "course of Augmentin as per ID recommendations.    Today she reports significant fatigue.  As long as she is sitting, she feels fine, but she tires with next to no exertion.  He also reports dyspnea on exertion.  On review of systems, she notes early satiety.  Review of weights shows that her weight has decreased from 226 lb immediately pre transplant down to 216 lb today.  She notes she had actually been up to 246 lb a few months earlier.  She denies any voiding difficulties.  She has been tolerating her medications without problems.    HTN: BP is noted to be a bit low, but within normal range.  It is noteworthy that prior to transplant her blood pressure had been in the 170s.    Review of Systems   Constitutional: Negative for fever.   HENT: Negative for mouth sores.    Eyes: Negative for visual disturbance.   Respiratory: Negative for shortness of breath.    Cardiovascular: Negative for chest pain and leg swelling.   Gastrointestinal: Negative.    Genitourinary: Negative for decreased urine volume, difficulty urinating, dysuria and hematuria.   Musculoskeletal: Negative.    Skin: Negative for rash.   Allergic/Immunologic: Positive for immunocompromised state.   Neurological: Negative for tremors.     Objective:   Blood pressure 112/68, pulse 97, temperature 98.6 °F (37 °C), temperature source Oral, resp. rate 18, height 5' 7" (1.702 m), weight 98 kg (216 lb 0.8 oz), last menstrual period 02/28/2012, SpO2 95 %.body mass index is 33.84 kg/m².    Physical Exam   Constitutional: No distress.   Cardiovascular: Normal rate and regular rhythm.   Pulmonary/Chest: Effort normal and breath sounds normal. No respiratory distress.   Abdominal: Soft. Bowel sounds are normal. She exhibits no mass. There is no tenderness.   Musculoskeletal: She exhibits no edema.   Skin: Skin is warm and dry.   Psychiatric: She has a normal mood and affect.     Labs:  Lab Results   Component Value Date    WBC 5.17 02/15/2019    HGB 8.6 (L) " 02/15/2019    HCT 27.7 (L) 02/15/2019     02/18/2019    K 4.8 02/18/2019     02/18/2019    CO2 26 02/18/2019    BUN 19 02/18/2019    CREATININE 1.3 02/18/2019    EGFRNONAA 44.4 (A) 02/18/2019    CALCIUM 9.4 02/18/2019    PHOS 2.5 (L) 02/18/2019    MG 1.4 (L) 02/18/2019    ALBUMIN 3.7 02/18/2019    AST 32 02/15/2019    ALT 40 02/15/2019    UTPCR 0.60 (H) 01/14/2019    .0 (H) 02/15/2019    TACROLIMUS 7.9 02/15/2019     No results found for: EXTANC, EXTWBC, EXTSEGS, EXTPLATELETS, EXTHEMOGLOBI, EXTHEMATOCRI, EXTCREATININ, EXTSODIUM, EXTPOTASSIUM, EXTBUN, EXTCO2, EXTCALCIUM, EXTPHOSPHORU, EXTGLUCOSE, EXTALBUMIN, EXTAST, EXTALT, EXTBILITOTAL, EXTLIPASE, EXTAMYLASE    No results found for: EXTCYCLOSLVL, EXTSIROLIMUS, EXTTACROLVL, EXTPROTCRE, EXTPTHINTACT, EXTPROTEINUA, EXTWBCUA, EXTRBCUA    Labs were reviewed with the patient    Assessment:     1. Status post living-donor kidney transplantation    2. CKD stage III (moderate)    3. At risk for opportunistic infections    4. Prophylactic immunotherapy    5. Long-term use of immunosuppressant medication    6. Hypomagnesemia    7. Anemia in stage 3 chronic kidney disease        Plan:   -GFR doing well following living donor kidney transplant    -Continue tacrolimus-based immunosuppression. Goal 7-10.  Will continue to watch for signs, symptoms and levels from side effects or drug toxicity.      -Hyperkalemia - continue low-potassium diet and p.r.n. Kayexalate.  No need for chronic medication therapy at present.    -Hypophosphatemia - mild on no supplement.  Continue to monitor.    -Hypomagnesemia- will continue magnesium oxide 800 mg daily;  Will tolerate mild asymptomatic low level d/t pill burden, DDI, and adverse SE     -OI prophylaxis:     -Bactrim d/c'd d/t hyperkalemia, start dapsone today since G6 PD acceptable.     -Valcyte study medication for CMV prophylaxis held last admit; OK to resume.    -Fatigue & weakness:  I suspect this is multifactorial.   She was recently hospitalized highs and is probably deconditioned.  She also has significant anemia compared to her pre transplant hemoglobin and is also experiencing much lower blood pressures than pre transplant.  This will require very close observation, since I am hopeful fatigue will improve as these issues resolve    -hypertension, now relative low readings.  Asked to increase her salt intake.    -nutrition seems okay, but weight loss is concerning.  I have encouraged her to eat small frequent meals with an emphasis on high quality protein.    -h/o mood disorder:  Cont pre-txp meds    -anemia of ? CKD versus chronic disease versus drug effect.  Also has known iron deficiency.  Will attempt to get IV iron and see if she can get some Procrit as well.    Follow-up:   Clinic: return to transplant clinic weekly for the first month after transplant; every 2 weeks during months 2-3; then at 6-, 9-, 12-, 18-, 24-, and 36- months post-transplant to reassess for complications from immunosuppression toxicity and monitor for rejection.  Annually thereafter.    Labs: since patient remains at high risk for rejection and drug-related complications that warrant close monitoring, labs will be ordered as follows: continue twice weekly CBC, renal panel, and drug level for first month; then same labs once weekly through 3rd month post-transplant.  Urine for UA and protein/creatinine ratio monthly.  Urine BK - PCR at 1-, 3-, 6-, 9-, 12-, 18-, 24-, and 36- months post-transplant.  Hepatic panel at 1-, 2-, 3-, 6-, 9-, 12-, 18-, 24-, and 36- months post-transplant.    Maureen Cabral MD

## 2019-02-18 ENCOUNTER — HOSPITAL ENCOUNTER (OUTPATIENT)
Dept: UROLOGY | Facility: HOSPITAL | Age: 59
Discharge: HOME | End: 2019-02-18
Attending: UROLOGY
Payer: MEDICARE

## 2019-02-18 ENCOUNTER — TELEPHONE (OUTPATIENT)
Dept: TRANSPLANT | Facility: CLINIC | Age: 59
End: 2019-02-18

## 2019-02-18 ENCOUNTER — RESEARCH ENCOUNTER (OUTPATIENT)
Dept: RESEARCH | Facility: HOSPITAL | Age: 59
End: 2019-02-18
Payer: MEDICAID

## 2019-02-18 ENCOUNTER — OFFICE VISIT (OUTPATIENT)
Dept: TRANSPLANT | Facility: CLINIC | Age: 59
End: 2019-02-18
Payer: MEDICARE

## 2019-02-18 VITALS
HEIGHT: 67 IN | HEART RATE: 97 BPM | WEIGHT: 218.69 LBS | SYSTOLIC BLOOD PRESSURE: 146 MMHG | TEMPERATURE: 99 F | BODY MASS INDEX: 34.33 KG/M2 | RESPIRATION RATE: 18 BRPM | DIASTOLIC BLOOD PRESSURE: 70 MMHG

## 2019-02-18 VITALS
OXYGEN SATURATION: 95 % | DIASTOLIC BLOOD PRESSURE: 68 MMHG | WEIGHT: 216.06 LBS | TEMPERATURE: 99 F | RESPIRATION RATE: 18 BRPM | BODY MASS INDEX: 33.91 KG/M2 | HEART RATE: 97 BPM | SYSTOLIC BLOOD PRESSURE: 112 MMHG | HEIGHT: 67 IN

## 2019-02-18 DIAGNOSIS — N18.30 ANEMIA IN STAGE 3 CHRONIC KIDNEY DISEASE: ICD-10-CM

## 2019-02-18 DIAGNOSIS — E83.42 HYPOMAGNESEMIA: ICD-10-CM

## 2019-02-18 DIAGNOSIS — Z79.60 LONG-TERM USE OF IMMUNOSUPPRESSANT MEDICATION: ICD-10-CM

## 2019-02-18 DIAGNOSIS — Z94.0 KIDNEY TRANSPLANT STATUS: ICD-10-CM

## 2019-02-18 DIAGNOSIS — Z94.0 STATUS POST LIVING-DONOR KIDNEY TRANSPLANTATION: Primary | ICD-10-CM

## 2019-02-18 DIAGNOSIS — N18.30 CHRONIC KIDNEY DISEASE (CKD), STAGE III (MODERATE): Chronic | ICD-10-CM

## 2019-02-18 DIAGNOSIS — D63.1 ANEMIA IN STAGE 3 CHRONIC KIDNEY DISEASE: ICD-10-CM

## 2019-02-18 DIAGNOSIS — Z91.89 AT RISK FOR OPPORTUNISTIC INFECTIONS: ICD-10-CM

## 2019-02-18 DIAGNOSIS — Z29.89 PROPHYLACTIC IMMUNOTHERAPY: ICD-10-CM

## 2019-02-18 LAB
BKV DNA SERPL NAA+PROBE-ACNC: <125 COPIES/ML
BKV DNA SERPL NAA+PROBE-LOG#: <2.1 LOG (10) COPIES/ML
BKV DNA SERPL QL NAA+PROBE: NOT DETECTED
CMV DNA SERPL NAA+PROBE-ACNC: NORMAL IU/ML

## 2019-02-18 PROCEDURE — 99499 RISK ADDL DX/OHS AUDIT: ICD-10-PCS | Mod: S$PBB,,, | Performed by: INTERNAL MEDICINE

## 2019-02-18 PROCEDURE — 99499 UNLISTED E&M SERVICE: CPT | Mod: S$PBB,,, | Performed by: INTERNAL MEDICINE

## 2019-02-18 PROCEDURE — 52310 CYSTOSCOPY AND TREATMENT: CPT | Mod: ,,, | Performed by: UROLOGY

## 2019-02-18 PROCEDURE — 99213 OFFICE O/P EST LOW 20 MIN: CPT | Mod: PBBFAC,25 | Performed by: INTERNAL MEDICINE

## 2019-02-18 PROCEDURE — 99999 PR PBB SHADOW E&M-EST. PATIENT-LVL III: ICD-10-PCS | Mod: PBBFAC,,, | Performed by: INTERNAL MEDICINE

## 2019-02-18 PROCEDURE — 99215 OFFICE O/P EST HI 40 MIN: CPT | Mod: S$PBB,,, | Performed by: INTERNAL MEDICINE

## 2019-02-18 PROCEDURE — 52310 CYSTOSCOPY AND TREATMENT: CPT

## 2019-02-18 PROCEDURE — 52310 PR CYSTOSCOPY,REMV CALCULUS,SIMPLE: ICD-10-PCS | Mod: ,,, | Performed by: UROLOGY

## 2019-02-18 PROCEDURE — 99215 PR OFFICE/OUTPT VISIT, EST, LEVL V, 40-54 MIN: ICD-10-PCS | Mod: S$PBB,,, | Performed by: INTERNAL MEDICINE

## 2019-02-18 PROCEDURE — 99999 PR PBB SHADOW E&M-EST. PATIENT-LVL III: CPT | Mod: PBBFAC,,, | Performed by: INTERNAL MEDICINE

## 2019-02-18 RX ORDER — LIDOCAINE HYDROCHLORIDE 20 MG/ML
JELLY TOPICAL ONCE
Status: COMPLETED | OUTPATIENT
Start: 2019-02-18 | End: 2019-02-18

## 2019-02-18 RX ADMIN — LIDOCAINE HYDROCHLORIDE 10 ML: 20 JELLY TOPICAL at 01:02

## 2019-02-18 NOTE — PATIENT INSTRUCTIONS

## 2019-02-18 NOTE — LETTER
February 18, 2019        Dalton Heaton  [address] [facility] [place]  [place] [address]  Phone: [phone]  Fax: [phone]             [facility]  [address]  [facility] [address]  Phone: [phone]   Patient: Andra Newell   MR Number: [id number]   YOB: 1960   Date of Visit: 2/18/2019       Dear Dr. Dalton Heaton    Thank you for referring Andra Newell to me for evaluation. Attached you will find relevant portions of my assessment and plan of care.    If you have questions, please do not hesitate to call me. I look forward to following Andra Newell along with you.    Sincerely,    Maureen Cabral MD    Enclosure    If you would like to receive this communication electronically, please contact [email or web address] or [phone] to request [employer] access.    [employer] is a tool which provides read-only access to select patient information with whom you have a relationship. Its easy to use and provides real time access to review your patients record including encounter summaries, notes, results, and demographic information.    If you feel you have received this communication in error or would no longer like to receive these types of communications, please e-mail [email or web address]

## 2019-02-18 NOTE — TELEPHONE ENCOUNTER
----- Message from Maureen Cabral MD sent at 2/18/2019  1:15 PM CST -----  The following message sent to patient via [facility]: Please repeat tacrolimus level as scheduled and ensure it is drawn at the correct time (just before next dose due) for accuracy.

## 2019-02-18 NOTE — TELEPHONE ENCOUNTER
Patient repeated back and voice a understanding of orders and will repeat Tacro level tomorrow 2/19/19.  12 hour trough reviewed with patient.

## 2019-02-18 NOTE — PROGRESS NOTES
[facility] CMV Prevention (Kidney Transplant)  Protocol: [id number]  IRB# 2017.512  PI: Nicholas ([email or web address])  Site: [id number]  Subject #[id number]     VISIT UNS / WEEK 5:       Date of Visit: 18/FEB/2019     Patient arrived at clinic for an UNS visit as a result of recent JOHN. Local labs drawn at 8:29 before study medication was taken from current study medication bottles. RANI, Byron Cardozo conducted visti in its entirety with Sub-I, Sharri Sands, and PI Dr. Peterson present. Patient agrees to continue his participation in the study and all questions answered.      Two AE's following. JOHN dating 03/FEB/2019 and JOHN dating 11/FEB/2019. Both dealt with dizziness/fatigue. Patient states she had low fatigue and no energy, but not really dizzy. Do to low hemoglobin, subject was put on hold from study medication starting 12/FEB/2019.     Subject met with Transplant doctor in the morning. PI discussed with transplant doctor and felt study medication did not cause the decrease in her health status. Plan to start study medication after talking with study patient today. Subject informed during her UNS visit the decision and subject agreed to re-start study medication.      Study participant reminded to call  for any questions. Reminder to bring diary and all pill bottles back to next appointment next week.

## 2019-02-19 ENCOUNTER — LAB VISIT (OUTPATIENT)
Dept: LAB | Facility: HOSPITAL | Age: 59
End: 2019-02-19
Attending: INTERNAL MEDICINE
Payer: MEDICARE

## 2019-02-19 DIAGNOSIS — Z94.0 KIDNEY REPLACED BY TRANSPLANT: ICD-10-CM

## 2019-02-19 PROCEDURE — 36415 COLL VENOUS BLD VENIPUNCTURE: CPT | Mod: PO

## 2019-02-19 PROCEDURE — 80197 ASSAY OF TACROLIMUS: CPT

## 2019-02-19 NOTE — OP NOTE
DATE OF PROCEDURE:  02/18/2019.    PREOPERATIVE DIAGNOSIS:  Status post kidney transplant with stent placement.    POSTOPERATIVE DIAGNOSIS:  Status post kidney transplant with stent placement.    OPERATIONS PERFORMED:  Cystoscopy and Double-J stent removal.    PROCEDURE IN DETAIL:  The patient was prepped and draped in the dorsal lithotomy   position.  Xylocaine jelly was instilled per urethra.  Urethra was normal.  The   bladder was examined and no abnormalities were noted.  The candace-ureteral ostomy   was visualized.  The stent was seen and grasped and removed intact.  The patient   tolerated the procedure well.  She will follow up with Transplant Nephrology in   one week or sooner p.r.n.  Her urine was clear today.      LACHO  dd: 02/18/2019 13:36:27 (CST)  td: 02/18/2019 18:25:01 (CST)  Doc ID   #[id number]  Job ID #[id number]    CC:

## 2019-02-20 LAB — TACROLIMUS BLD-MCNC: 9.4 NG/ML

## 2019-02-25 ENCOUNTER — LAB VISIT (OUTPATIENT)
Dept: LAB | Facility: HOSPITAL | Age: 59
End: 2019-02-25
Attending: INTERNAL MEDICINE
Payer: MEDICARE

## 2019-02-25 DIAGNOSIS — Z94.0 KIDNEY REPLACED BY TRANSPLANT: ICD-10-CM

## 2019-02-25 LAB
ALBUMIN SERPL BCP-MCNC: 3.6 G/DL
ANION GAP SERPL CALC-SCNC: 7 MMOL/L
BASOPHILS # BLD AUTO: 0.01 K/UL
BASOPHILS NFR BLD: 0.2 %
BUN SERPL-MCNC: 17 MG/DL
CALCIUM SERPL-MCNC: 9.1 MG/DL
CHLORIDE SERPL-SCNC: 107 MMOL/L
CO2 SERPL-SCNC: 26 MMOL/L
CREAT SERPL-MCNC: 1.3 MG/DL
DIFFERENTIAL METHOD: ABNORMAL
EOSINOPHIL # BLD AUTO: 0.1 K/UL
EOSINOPHIL NFR BLD: 1.9 %
ERYTHROCYTE [DISTWIDTH] IN BLOOD BY AUTOMATED COUNT: 15.8 %
EST. GFR  (AFRICAN AMERICAN): 51.2 ML/MIN/1.73 M^2
EST. GFR  (NON AFRICAN AMERICAN): 44.4 ML/MIN/1.73 M^2
GLUCOSE SERPL-MCNC: 223 MG/DL
HCT VFR BLD AUTO: 24.9 %
HGB BLD-MCNC: 7.6 G/DL
IMM GRANULOCYTES # BLD AUTO: 0.03 K/UL
IMM GRANULOCYTES NFR BLD AUTO: 0.7 %
LYMPHOCYTES # BLD AUTO: 0.2 K/UL
LYMPHOCYTES NFR BLD: 3.9 %
MAGNESIUM SERPL-MCNC: 1.4 MG/DL
MCH RBC QN AUTO: 29.1 PG
MCHC RBC AUTO-ENTMCNC: 30.5 G/DL
MCV RBC AUTO: 95 FL
MONOCYTES # BLD AUTO: 0.3 K/UL
MONOCYTES NFR BLD: 8 %
NEUTROPHILS # BLD AUTO: 3.5 K/UL
NEUTROPHILS NFR BLD: 85.3 %
NRBC BLD-RTO: 1 /100 WBC
PHOSPHATE SERPL-MCNC: 2.8 MG/DL
PLATELET # BLD AUTO: 193 K/UL
PMV BLD AUTO: 10.3 FL
POTASSIUM SERPL-SCNC: 4.6 MMOL/L
RBC # BLD AUTO: 2.61 M/UL
SODIUM SERPL-SCNC: 140 MMOL/L
WBC # BLD AUTO: 4.15 K/UL

## 2019-02-25 PROCEDURE — 85025 COMPLETE CBC W/AUTO DIFF WBC: CPT

## 2019-02-25 PROCEDURE — 80197 ASSAY OF TACROLIMUS: CPT

## 2019-02-25 PROCEDURE — 80069 RENAL FUNCTION PANEL: CPT

## 2019-02-25 PROCEDURE — 36415 COLL VENOUS BLD VENIPUNCTURE: CPT | Mod: PO

## 2019-02-25 PROCEDURE — 83735 ASSAY OF MAGNESIUM: CPT

## 2019-02-26 DIAGNOSIS — Z94.0 KIDNEY REPLACED BY TRANSPLANT: ICD-10-CM

## 2019-02-26 LAB — TACROLIMUS BLD-MCNC: 11.6 NG/ML

## 2019-02-26 RX ORDER — TACROLIMUS 0.5 MG/1
CAPSULE ORAL
Qty: 120 CAPSULE | Refills: 11 | Status: ON HOLD | OUTPATIENT
Start: 2019-02-26 | End: 2019-04-05 | Stop reason: SDUPTHER

## 2019-02-26 NOTE — PROGRESS NOTES
Subjective:          Chief Complaint: Andra Newell is a 59 y.o. female who had concerns including Pain of the Left Shoulder.    Patient is here for a follow up for her left shoulder, left hip and left achilles tendon. She is currently not a surgical candidate due to recent kidney transplant 5 weeks ago. She is having some complications with this transplantation. She currently remains disabled due to her conditions.    DATE OF PROCEDURE: 7/12/2016      ATTENDING SURGEON: Surgeon(s) and Role:  * Moe Meléndez MD - Primary  Assistants:  DO Leydi Arrieta SMA      PREOPERATIVE DIAGNOSIS:  Left shoulder   Superior glenoid labrum lesion (SLAP) S43.439A and Tear, biceps tendon, non-traumatic M66.829 A-C Arthritis M12.9, Rotator Cuff Syndrome/Disorder M75.100, Tear, Rotator Cuff, Tramatic S46.012A, S46.011A and Tendinitis, Biceps M75.20      POSTOPERATIVE DIAGNOSIS: Left shoulder   Superior glenoid labrum lesion (SLAP) S43.439A and Tendinitis, Biceps M75.20 A-C Arthritis M12.9, Rotator Cuff Syndrome/Disorder M75.100, Tear, Biceps Tendon, Non-Tramatic M66.829 and Tear, Rotator Cuff, Tramatic S46.012A, S46.011A       PROCEDURES PERFORMED:  1. Left shoulder Arthroscopic rotator cuff repair CPT - 95371      2. Left shoulder Arthroscopic distal clavicle excision CPT - 85747      3. Left shoulder Arthroscopic labral debridement CPT - 92286      4. Left shoulder Arthroscopic lysis of adhesions CPT - 91493      5. Left shoulder Amniox Arthrocentesis, 23525           Pain   Associated symptoms include neck pain. Pertinent negatives include no chest pain, fever, joint swelling, numbness or rash.       Review of Systems   Constitution: Negative for fever and night sweats.   HENT: Negative for hearing loss.    Eyes: Negative for blurred vision and visual disturbance.   Cardiovascular: Negative for chest pain and leg swelling.   Respiratory: Negative for shortness of breath.    Endocrine: Negative for  polyuria.   Hematologic/Lymphatic: Negative for bleeding problem.   Skin: Negative for rash.   Musculoskeletal: Positive for joint pain and neck pain. Negative for back pain, joint swelling, muscle cramps and muscle weakness.   Gastrointestinal: Negative for melena.   Genitourinary: Negative for hematuria.   Neurological: Negative for loss of balance, numbness and paresthesias.   Psychiatric/Behavioral: Negative for altered mental status.                   Objective:        General: Andra is well-developed, well-nourished, appears stated age, in no acute distress, alert and oriented to time, place and person.     General    Vitals reviewed.  Constitutional: She is oriented to person, place, and time. She appears well-developed and well-nourished. No distress.   HENT:   Nose: Nose normal.   Mouth/Throat: No oropharyngeal exudate.   Eyes: Pupils are equal, round, and reactive to light. Right eye exhibits no discharge. Left eye exhibits no discharge.   Neck: Normal range of motion.   Cardiovascular: Normal rate and intact distal pulses.    Pulmonary/Chest: Effort normal and breath sounds normal. No respiratory distress.   Neurological: She is alert and oriented to person, place, and time. She has normal reflexes. She displays normal reflexes. No cranial nerve deficit. Coordination normal.   Psychiatric: She has a normal mood and affect. Her behavior is normal. Judgment and thought content normal.     General Musculoskeletal Exam   Gait: normal   Pelvic Obliquity: none      Right Knee Exam     Inspection   Alignment:  normal  Erythema: absent  Scars: absent  Swelling: absent  Effusion: absent  Deformity: absent  Bruising: absent    Tenderness   The patient is experiencing no tenderness.     Range of Motion   Extension: 0   Flexion: 140     Tests   Meniscus   Sonja:  Medial - negative Lateral - negative  Ligament Examination Lachman: normal (-1 to 2mm) PCL-Posterior Drawer: normal (0 to 2mm)     MCL - Valgus: normal  (0 to 2mm)  LCL - Varus: normalPivot Shift: normal (Equal)Reverse Pivot Shift: normal (Equal)Dial Test at 30 degrees: normal (< 5 degrees)Dial Test at 90 degrees: normal (< 5 degrees)  Posterior Sag Test: negative  Posterolateral Corner: unstable (>15 degrees difference)  Patella   Patellar apprehension: negative  Passive Patellar Tilt: neutral  Patellar Tracking: normal  Patellar Glide (quadrants): Lateral - 1   Medial - 2  Q-Angle at 90 degrees: normal  Patellar Grind: negative  J-Sign: none    Other   Meniscal Cyst: absent  Popliteal (Baker's) Cyst: absent  Sensation: normal    Left Knee Exam     Inspection   Alignment:  normal  Erythema: absent  Scars: absent  Swelling: absent  Effusion: absent  Deformity: absent  Bruising: absent    Tenderness   The patient is experiencing no tenderness.     Range of Motion   Extension: 0   Flexion: 140     Tests   Meniscus   Sonja:  Medial - negative Lateral - negative  Stability Lachman: normal (-1 to 2mm) PCL-Posterior Drawer: normal (0 to 2mm)  MCL - Valgus: normal (0 to 2mm)  LCL - Varus: normal (0 to 2mm)Pivot Shift: normal (Equal)Reverse Pivot Shift: normal (Equal)Dial Test at 30 degrees: normal (< 5 degrees)Dial Test at 90 degrees: normal (< 5 degrees)  Posterior Sag Test: negative  Posterolateral Corner: unstable (>15 degrees difference)  Patella   Patellar apprehension: negative  Passive Patellar Tilt: neutral  Patellar Tracking: normal  Patellar Glide (Quadrants): Lateral - 1 Medial - 2  Q-Angle at 90 degrees: normal  Patellar Grind: negative  J-Sign: J sign absent    Other   Meniscal Cyst: absent  Popliteal (Baker's) Cyst: absent  Sensation: normal    Right Hip Exam     Inspection   Scars: absent  Swelling: absent  Bruising: absent  No deformity of hip.  Quadriceps Atrophy:  Negative  Erythema: absent    Range of Motion   Abduction:  40 normal   Adduction:  20 normal   Extension:  0 normal   Flexion:  110 normal   External rotation:  60 normal   Internal  rotation:  15 normal     Tests   Pain w/ forced internal rotation (KARINA): absent  Pain w/ forced external rotation (FADIR): absent  Rox: negative  Trendelenburg Test: negative  Circumduction test: negative  Stinchfield test: negative  Log Roll: negative  Snapping Hip (internal): negative  Step-down test: negative  Resisted sit-up pain: negative  Unresisted sit-up pain: negative  Resisted sit-up pain with adductor contraction pain: negative    Other   Sensation: normal  Left Hip Exam     Inspection   Scars: absent  Swelling: absent  No deformity of hip.  Quadriceps Atrophy:  negative  Erythema: absent  Bruising: absent    Range of Motion   Abduction:  40 normal   Adduction:  20 normal   Extension:  0 normal   Flexion:  110 normal   External rotation:  60 normal   Internal rotation: 15 normal     Tests   Pain w/ forced internal rotation (KARINA): absent  Pain w/ forced external rotation (FADIR): absent  Rox: negative  Trendelenburg Test: negative  Circumduction test: negative  Stinchfield test: negative  Log Roll: negative  Snapping Hip (internal): negative  Step-down test: negative  Resisted sit-up pain: negative  Resisted sit-up pain with adductor contraction pain: negative  Unresisted sit-up pain: negative    Other   Sensation: normal      Back (L-Spine & T-Spine) / Neck (C-Spine) Exam   Back exam is normal.    Tenderness Right paramedian tenderness of the Upper C-Spine and Upper L-Spine. Left paramedian tenderness of the Upper C-Spine and Lower L-Spine.     Neck (C-Spine) Range of Motion   Flexion:     Limited  Extension: Limited  Right Shoulder Exam     Inspection/Observation   Swelling: absent  Bruising: absent  Scars: present  Deformity: absent  Scapular Winging: absent  Scapular Dyskinesia: negative  Atrophy: absent    Tenderness   The patient is experiencing no tenderness.    Range of Motion   Active abduction:  90 normal   Passive abduction:  100 normal   Extension:  0 normal   Forward Flexion:  180 normal    Forward Elevation: 180 normal  Adduction: 40 normal  External Rotation 0 degrees:  50 normal   External Rotation 90 degrees: 90 normal  Internal rotation 0 degrees:  T12 normal   Internal rotation 90 degrees:  30 normal     Tests & Signs   Apprehension: negative  Cross arm: negative  Drop arm: negative  Chen test: negative  Impingement: negative  Sulcus: absent  Anterosuperior Escape: negative  Lag Sign 0 degrees: negative  Lag Sign 90 degrees: negative  Lift Off Sign: negative  Belly Press: negative  Active Compression Test (Latah's Sign): negative  Yergason's Test: negative  Speed's Test: negative  Anterior Drawer Test: 1+   Posterior Drawer Test: 1+  Relocation 90 degrees: negative  Relocation > 90 degrees: negative  Bear Hug: negative  Moving Valgus: negative  Jerk Test: negative    Other   Sensation: normal    Left Shoulder Exam     Inspection/Observation   Swelling: absent  Bruising: absent  Scars: present  Deformity: absent  Scapular Winging: absent  Scapular Dyskinesia: negative  Atrophy: absent    Tenderness   The patient is tender to palpation of the acromioclavicular joint and greater tuberosity.    Range of Motion   Active abduction:  70 normal   Passive abduction:  80 abnormal   Extension:  0 normal   Forward Flexion:  140 abnormal   Forward Elevation: 140 abnormal  Adduction: 40 abnormal  External Rotation 0 degrees:  40 abnormal   External Rotation 90 degrees: 60 abnormal  Internal rotation 0 degrees:  L1 normal   Internal rotation 90 degrees:  20 normal     Tests & Signs   Apprehension: negative  Cross arm: negative  Drop arm: positive  Chen test: positive  Impingement: positive  Sulcus: absent  Rotator Cuff Painful Arc/Range: severe  Anterosuperior Escape: negative  Lag Sign 0 degrees: negative  Lag Sign 90 degrees: negative  Lift Off Sign: negative  Belly Press: negative  Active Compression test (Latah's Sign): negative  Yergasons's Test: negative  Speed's Test: negative  Anterior  Drawer Test: 1+  Posterior Drawer Test: 1+  Relocation 90 degrees: negative  Relocation > 90 degrees: negative  Bear Hug: negative  Moving Valgus: negative  Jerk Test: negative    Other   Sensation: normal     Comments:  Significant guarding of left shoulder.      Muscle Strength   Right Upper Extremity   Shoulder Abduction: 5/5   Shoulder Internal Rotation: 5/5   Shoulder External Rotation: 5/5   Supraspinatus: 5/5/5   Subscapularis: 5/5/5   Biceps: 5/5/5   Deltoid:  5/5  Triceps:  5/5  Wrist extension: 5/5/5   Wrist flexion: 5/5/5   Finger Flexors:  5/5  Finger Extensors:  5/5  Left Upper Extremity  Shoulder Abduction: 5/5   Shoulder Internal Rotation: 4/5   Shoulder External Rotation: 4/5   Supraspinatus: 4/5/5   Subscapularis: 4/5/5   Biceps: 4/5/5   Right Lower Extremity   Hip Abduction: 5/5   Hip Adduction: 5/5   Hip Flexion: 5/5   Left Lower Extremity   Hip Abduction: 5/5   Hip Adduction: 5/5   Hip Flexion: 5/5     Reflexes     Left Side  Biceps:  2+  Triceps:  2+  Brachioradialis:  2+  Quadriceps:  2+  Achilles:  2+    Right Side   Biceps:  2+  Triceps:  2+  Brachioradialis:  2+  Quadriceps:  2+  Achilles:  2+    Vascular Exam     Right Pulses  Dorsalis Pedis:      2+  Posterior Tibial:      2+  Radial:                    2+      Left Pulses  Dorsalis Pedis:      2+  Posterior Tibial:      2+  Radial:                    2+      Capillary Refill  Right Hand: normal capillary refill  Left Hand: normal capillary refill    Edema  Right Upper Leg: absent  Left Upper Leg: absent    Outside MRI results  1. Post-surgical changes  2. Small full thickness tear of the distal supraspinatus is still suggested  3. Increasing tendinosis with some intratendinous partial tear of the supraspinatus at the musculotendinous junction  4. Abnormal appearing anterior superior labrum as on previous exam  5. Joint effusions, fluid in the subacromial/subdeltoid bursae and in the AC joint  6. AC joint is borderline slightly wider than  on previous exam  7. Subscapularis tendinosis, no tear        Assessment:       Encounter Diagnoses   Name Primary?    Left hip pain Yes    Chronic left shoulder pain     Long-term use of immunosuppressant medication     Chronic bilateral low back pain without sciatica     Rotator cuff syndrome of left shoulder     Biceps tendinitis on left     Hip arthritis           Plan:       1. ASES and SF-12 was filled out today in clinic.     RTC in 3 months with Brenda Mansfield PA-C ( expedite MRI of shoulder and hip at next visit). Patient will fill out ASES, SF-12 on return.      2. We reviewed with Andra today, the pathology and natural history of her diagnosis. We have discussed a variety of treatment options including medications, physical therapy and other alternative treatments. I also explained the indications, risks and benefits of surgery. After discussion, Andra decided to proceed with surgery. The decision was made to go forward with : UNABLE TO PROCEED AT THIS TIME RECOVERING FROM KIDNEY TRANSPLANT  1. Left shoulder arthroscopic rotator cuff repair   (Collagen scaffold - Rotation Medical Device) / Revision SAD    2. Left shoulder superior capsular reconstruction (Possible)  (Arthrex technique with use of Flex HD graft)    3. Left shoulder arthroscopic lysis of adhesions      The details of the surgical procedure were explained, including the location of probable incisions and a description of likely hardware and/or grafts to be used.  The patient understands the likely convalescence after surgery.  Also, we have thoroughly discussed the risks, benefits and alternatives to surgery, including, but not limited to, the risk of infection, joint stiffness, blood clot (including DVT and/or pulmonary embolus), neurologic and vascular injury.  It was explained that, if tissue has been repaired or reconstructed, there is a chance of failure, which may require further management.      All of the patient's questions  were answered and informed consent was obtained. The patient will contact us if they have any questions or concerns in the interim.    3. LTD forms completed today. Pt is remains permanently disabled.                     [facility] patient questionnaires have been collected today.

## 2019-02-26 NOTE — TELEPHONE ENCOUNTER
----- Message from Maureen Cabral MD sent at 2/26/2019  1:18 PM CST -----  The following message sent to patient via [facility]:   If this is an accurate 12 hr tacrolimus level, please decrease your dose from 1.5/1.5 down to 1 mg twice daily.

## 2019-02-27 ENCOUNTER — RESEARCH ENCOUNTER (OUTPATIENT)
Dept: RESEARCH | Facility: HOSPITAL | Age: 59
End: 2019-02-27
Payer: MEDICARE

## 2019-02-27 ENCOUNTER — OFFICE VISIT (OUTPATIENT)
Dept: TRANSPLANT | Facility: CLINIC | Age: 59
End: 2019-02-27
Payer: MEDICARE

## 2019-02-27 ENCOUNTER — OFFICE VISIT (OUTPATIENT)
Dept: SPORTS MEDICINE | Facility: CLINIC | Age: 59
End: 2019-02-27
Payer: MEDICARE

## 2019-02-27 ENCOUNTER — HOSPITAL ENCOUNTER (OUTPATIENT)
Dept: RADIOLOGY | Facility: HOSPITAL | Age: 59
Discharge: HOME | End: 2019-02-27
Attending: ORTHOPAEDIC SURGERY
Payer: MEDICARE

## 2019-02-27 ENCOUNTER — CLINICAL SUPPORT (OUTPATIENT)
Dept: TRANSPLANT | Facility: CLINIC | Age: 59
End: 2019-02-27
Payer: MEDICARE

## 2019-02-27 VITALS
HEART RATE: 91 BPM | OXYGEN SATURATION: 98 % | TEMPERATURE: 99 F | WEIGHT: 218.25 LBS | RESPIRATION RATE: 16 BRPM | SYSTOLIC BLOOD PRESSURE: 128 MMHG | HEIGHT: 67 IN | BODY MASS INDEX: 34.26 KG/M2 | DIASTOLIC BLOOD PRESSURE: 60 MMHG

## 2019-02-27 VITALS
HEIGHT: 67 IN | BODY MASS INDEX: 34.26 KG/M2 | HEART RATE: 91 BPM | SYSTOLIC BLOOD PRESSURE: 126 MMHG | TEMPERATURE: 99 F | WEIGHT: 218.25 LBS | DIASTOLIC BLOOD PRESSURE: 60 MMHG | OXYGEN SATURATION: 98 % | RESPIRATION RATE: 16 BRPM

## 2019-02-27 VITALS — WEIGHT: 218 LBS | BODY MASS INDEX: 34.21 KG/M2 | HEIGHT: 67 IN

## 2019-02-27 DIAGNOSIS — Z94.0 STATUS POST LIVING-DONOR KIDNEY TRANSPLANTATION: ICD-10-CM

## 2019-02-27 DIAGNOSIS — Z79.899 IMMUNOSUPPRESSIVE MANAGEMENT ENCOUNTER FOLLOWING KIDNEY TRANSPLANT: ICD-10-CM

## 2019-02-27 DIAGNOSIS — G89.29 CHRONIC BILATERAL LOW BACK PAIN WITHOUT SCIATICA: ICD-10-CM

## 2019-02-27 DIAGNOSIS — D63.1 ANEMIA IN CHRONIC KIDNEY DISEASE, UNSPECIFIED CKD STAGE: ICD-10-CM

## 2019-02-27 DIAGNOSIS — N18.9 ANEMIA IN CHRONIC KIDNEY DISEASE, UNSPECIFIED CKD STAGE: ICD-10-CM

## 2019-02-27 DIAGNOSIS — Z29.89 PROPHYLACTIC IMMUNOTHERAPY: ICD-10-CM

## 2019-02-27 DIAGNOSIS — N18.30 CHRONIC KIDNEY DISEASE (CKD), STAGE III (MODERATE): Chronic | ICD-10-CM

## 2019-02-27 DIAGNOSIS — Z79.60 LONG-TERM USE OF IMMUNOSUPPRESSANT MEDICATION: ICD-10-CM

## 2019-02-27 DIAGNOSIS — R19.7 DIARRHEA, UNSPECIFIED TYPE: ICD-10-CM

## 2019-02-27 DIAGNOSIS — M54.50 CHRONIC BILATERAL LOW BACK PAIN WITHOUT SCIATICA: ICD-10-CM

## 2019-02-27 DIAGNOSIS — Z94.0 IMMUNOSUPPRESSIVE MANAGEMENT ENCOUNTER FOLLOWING KIDNEY TRANSPLANT: ICD-10-CM

## 2019-02-27 DIAGNOSIS — R82.71 ASYMPTOMATIC BACTERIURIA: Primary | ICD-10-CM

## 2019-02-27 DIAGNOSIS — Z94.0 KIDNEY TRANSPLANT RECIPIENT: Primary | ICD-10-CM

## 2019-02-27 DIAGNOSIS — M16.10 HIP ARTHRITIS: ICD-10-CM

## 2019-02-27 DIAGNOSIS — D63.1 ANEMIA IN STAGE 3 CHRONIC KIDNEY DISEASE: ICD-10-CM

## 2019-02-27 DIAGNOSIS — M75.102 ROTATOR CUFF SYNDROME OF LEFT SHOULDER: ICD-10-CM

## 2019-02-27 DIAGNOSIS — D64.9 ACUTE ON CHRONIC ANEMIA: Primary | ICD-10-CM

## 2019-02-27 DIAGNOSIS — M25.512 CHRONIC LEFT SHOULDER PAIN: ICD-10-CM

## 2019-02-27 DIAGNOSIS — Z91.89 AT RISK FOR OPPORTUNISTIC INFECTIONS: ICD-10-CM

## 2019-02-27 DIAGNOSIS — G89.29 CHRONIC LEFT SHOULDER PAIN: ICD-10-CM

## 2019-02-27 DIAGNOSIS — Z00.6 RESEARCH SUBJECT: ICD-10-CM

## 2019-02-27 DIAGNOSIS — N18.30 ANEMIA IN STAGE 3 CHRONIC KIDNEY DISEASE: ICD-10-CM

## 2019-02-27 DIAGNOSIS — M25.552 LEFT HIP PAIN: ICD-10-CM

## 2019-02-27 DIAGNOSIS — M25.552 LEFT HIP PAIN: Primary | ICD-10-CM

## 2019-02-27 DIAGNOSIS — M75.22 BICEPS TENDINITIS ON LEFT: ICD-10-CM

## 2019-02-27 DIAGNOSIS — E83.42 HYPOMAGNESEMIA: ICD-10-CM

## 2019-02-27 PROCEDURE — 87086 URINE CULTURE/COLONY COUNT: CPT

## 2019-02-27 PROCEDURE — 73030 X-RAY EXAM OF SHOULDER: CPT | Mod: 26,LT,, | Performed by: RADIOLOGY

## 2019-02-27 PROCEDURE — 73502 X-RAY EXAM HIP UNI 2-3 VIEWS: CPT | Mod: TC,FY,PO,LT

## 2019-02-27 PROCEDURE — 99213 OFFICE O/P EST LOW 20 MIN: CPT | Mod: PBBFAC,25,PO | Performed by: ORTHOPAEDIC SURGERY

## 2019-02-27 PROCEDURE — 99215 PR OFFICE/OUTPT VISIT, EST, LEVL V, 40-54 MIN: ICD-10-PCS | Mod: S$PBB,,, | Performed by: INTERNAL MEDICINE

## 2019-02-27 PROCEDURE — 99499 RISK ADDL DX/OHS AUDIT: ICD-10-PCS | Mod: S$PBB,,, | Performed by: ORTHOPAEDIC SURGERY

## 2019-02-27 PROCEDURE — 99215 OFFICE O/P EST HI 40 MIN: CPT | Mod: S$PBB,,, | Performed by: INTERNAL MEDICINE

## 2019-02-27 PROCEDURE — 73030 X-RAY EXAM OF SHOULDER: CPT | Mod: TC,FY,PO,LT

## 2019-02-27 PROCEDURE — 99213 OFFICE O/P EST LOW 20 MIN: CPT | Mod: PBBFAC,25,27

## 2019-02-27 PROCEDURE — 99499 UNLISTED E&M SERVICE: CPT | Mod: S$PBB,,, | Performed by: ORTHOPAEDIC SURGERY

## 2019-02-27 PROCEDURE — 87186 SC STD MICRODIL/AGAR DIL: CPT

## 2019-02-27 PROCEDURE — 99999 PR PBB SHADOW E&M-EST. PATIENT-LVL III: ICD-10-PCS | Mod: PBBFAC,,, | Performed by: INTERNAL MEDICINE

## 2019-02-27 PROCEDURE — 99214 OFFICE O/P EST MOD 30 MIN: CPT | Mod: S$PBB,,, | Performed by: ORTHOPAEDIC SURGERY

## 2019-02-27 PROCEDURE — 99499 UNLISTED E&M SERVICE: CPT | Mod: S$PBB,,, | Performed by: INTERNAL MEDICINE

## 2019-02-27 PROCEDURE — 99213 OFFICE O/P EST LOW 20 MIN: CPT | Mod: PBBFAC,25,27 | Performed by: INTERNAL MEDICINE

## 2019-02-27 PROCEDURE — 99999 PR PBB SHADOW E&M-EST. PATIENT-LVL III: CPT | Mod: PBBFAC,,,

## 2019-02-27 PROCEDURE — 87077 CULTURE AEROBIC IDENTIFY: CPT

## 2019-02-27 PROCEDURE — 99214 PR OFFICE/OUTPT VISIT, EST, LEVL IV, 30-39 MIN: ICD-10-PCS | Mod: S$PBB,,, | Performed by: ORTHOPAEDIC SURGERY

## 2019-02-27 PROCEDURE — 99999 PR PBB SHADOW E&M-EST. PATIENT-LVL III: CPT | Mod: PBBFAC,,, | Performed by: ORTHOPAEDIC SURGERY

## 2019-02-27 PROCEDURE — 99999 PR PBB SHADOW E&M-EST. PATIENT-LVL III: ICD-10-PCS | Mod: PBBFAC,,, | Performed by: ORTHOPAEDIC SURGERY

## 2019-02-27 PROCEDURE — 73030 XR SHOULDER COMPLETE 2 OR MORE VIEWS LEFT: ICD-10-PCS | Mod: 26,LT,, | Performed by: RADIOLOGY

## 2019-02-27 PROCEDURE — 99499 RISK ADDL DX/OHS AUDIT: ICD-10-PCS | Mod: S$PBB,,, | Performed by: INTERNAL MEDICINE

## 2019-02-27 PROCEDURE — 99999 PR PBB SHADOW E&M-EST. PATIENT-LVL III: CPT | Mod: PBBFAC,,, | Performed by: INTERNAL MEDICINE

## 2019-02-27 PROCEDURE — 73502 XR HIP 2 VIEW LEFT: ICD-10-PCS | Mod: 26,LT,, | Performed by: RADIOLOGY

## 2019-02-27 PROCEDURE — 99999 PR PBB SHADOW E&M-EST. PATIENT-LVL III: ICD-10-PCS | Mod: PBBFAC,,,

## 2019-02-27 PROCEDURE — 87088 URINE BACTERIA CULTURE: CPT

## 2019-02-27 PROCEDURE — 73502 X-RAY EXAM HIP UNI 2-3 VIEWS: CPT | Mod: 26,LT,, | Performed by: RADIOLOGY

## 2019-02-27 RX ORDER — FAMOTIDINE 20 MG/1
20 TABLET, FILM COATED ORAL 2 TIMES DAILY
Status: DISCONTINUED | OUTPATIENT
Start: 2019-02-27 | End: 2019-02-27

## 2019-02-27 RX ORDER — FAMOTIDINE 20 MG/1
20 TABLET, FILM COATED ORAL 2 TIMES DAILY
Qty: 60 TABLET | Refills: 11 | Status: ON HOLD | OUTPATIENT
Start: 2019-02-27 | End: 2019-05-10 | Stop reason: HOSPADM

## 2019-02-27 NOTE — PROGRESS NOTES
Kidney Post-Transplant Assessment    Referring Physician: Dalton Heaton  Current Nephrologist: Dalton Heaton    ORGAN: LEFT KIDNEY  Donor Type: living  PHS Increased Risk: no  Cold Ischemia: 37 mins  Induction Medications: campath - alemtuzumab (anti-cd52)    Subjective:     CC:  Reassessment of renal allograft function and management of chronic immunosuppression.    HPI:  Ms. Newell is a 59 y.o. year old White female who received a living kidney transplant on 1/14/19. Her most recent creatinine is 1.1. She takes mycophenolate mofetil and tacrolimus for maintenance immunosuppression. Her post transplant course has been uncomplicated to date.    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 She briefly required HD 4/13/18 until ~Sept 2018.  she was predialysis at the time of transplant.   -LURD KT (friend) 01/14/2019,  Induced with Campath.  CMV  Mismatch -study participant.  -01/24/2019 hyperkalemia, Bactrim discontinued  - +Kleb Pneumo ESBL UTI  -Admit for unsteadiness and dizziness with work up WNL, MRI pending (r/s d/t hosp admit)    PCP PROPHYLAXIS: Until 1/14/19;  Bactrim stopped 01/24/2019 D/T hiK  CMV PROPHYLAXIS: Valcyte until 7/14/19  FUNGAL PROPHYLAXIS: None    Andra was recently admitted twice to the hospital. First was for concern for stroke. She had unsteady gait and dizziness during this hospitalization but symptoms improved.MRI was attempted at that time. However, unable to be obtained inpatient as patient needs to be sedated for testing, which cannot be done inpatient.  Her 2nd admission was for dyspnea, chest pain, dysuria and weakness.  V/Q, echo, and lower extremity ultrasound were negative.  She was determined to have Klebsiella pneumoniae ESBL UTI.  She was also noted to have significant anemia, causing the CMV study medications to be discontinued.  She required blood transfusion.   She was discharged to complete a  "course of Augmentin as per ID recommendations.    Andra continues to report overwhelming fatigue, no energy, feeling wobbly with walking, and sleeping a lot.  She feels this is new over the last several weeks.  She also reports having diarrhea over the last 3 days, 5 episodes of watery stools without form yesterday.  She reports the stool as looking like black coffee grounds, but she was able to show me a picture of the toilet, and I could not discern the classic appearance of coffee-grounds, more like soft on formed stool.  She also denies any nausea, vomiting, diarrhea.  In fact, she reports that Protonix has not been as effective as Pepcid.    From a urinary perspective, recent UA showed pyuria.  She reports having had terminal dysuria several days ago, but that resolved.  She reports no frequency, urgency, dysuria or other LUTS at present.    Review of Systems   Constitutional: Positive for fatigue. Negative for fever.   HENT: Negative for mouth sores.    Eyes: Negative for visual disturbance.   Respiratory: Negative for shortness of breath.    Cardiovascular: Negative for chest pain and leg swelling.   Gastrointestinal: Negative.    Genitourinary: Negative for decreased urine volume, difficulty urinating and dysuria.   Musculoskeletal: Negative.    Skin: Negative for rash.   Allergic/Immunologic: Positive for immunocompromised state.   Neurological: Negative for tremors.     Objective: /60 (BP Location: Right arm, Patient Position: Sitting, BP Method: Large (Automatic))   Pulse 91   Temp 99.2 °F (37.3 °C) (Oral)   Resp 16   Ht 5' 7" (1.702 m)   Wt 99 kg (218 lb 4.1 oz)   LMP 02/28/2012   SpO2 98%   BMI 34.18 kg/m²       Physical Exam   Constitutional: No distress.   Cardiovascular: Normal rate and regular rhythm.   Pulmonary/Chest: Effort normal and breath sounds normal. No respiratory distress.   Abdominal: Soft. Bowel sounds are normal. She exhibits no mass. There is no tenderness. "   Musculoskeletal: She exhibits no edema.   Skin: Skin is warm and dry.   Psychiatric: She has a normal mood and affect.     Labs:  Lab Results   Component Value Date    WBC 4.15 02/25/2019    HGB 7.6 (L) 02/25/2019    HCT 24.9 (L) 02/25/2019     02/25/2019    K 4.6 02/25/2019     02/25/2019    CO2 26 02/25/2019    BUN 17 02/25/2019    CREATININE 1.3 02/25/2019    EGFRNONAA 44.4 (A) 02/25/2019    CALCIUM 9.1 02/25/2019    PHOS 2.8 02/25/2019    MG 1.4 (L) 02/25/2019    ALBUMIN 3.6 02/25/2019    AST 32 02/15/2019    ALT 40 02/15/2019    UTPCR 0.19 02/25/2019    .0 (H) 02/15/2019    TACROLIMUS 11.6 02/25/2019     No results found for: EXTANC, EXTWBC, EXTSEGS, EXTPLATELETS, EXTHEMOGLOBI, EXTHEMATOCRI, EXTCREATININ, EXTSODIUM, EXTPOTASSIUM, EXTBUN, EXTCO2, EXTCALCIUM, EXTPHOSPHORU, EXTGLUCOSE, EXTALBUMIN, EXTAST, EXTALT, EXTBILITOTAL, EXTLIPASE, EXTAMYLASE    No results found for: EXTCYCLOSLVL, EXTSIROLIMUS, EXTTACROLVL, EXTPROTCRE, EXTPTHINTACT, EXTPROTEINUA, EXTWBCUA, EXTRBCUA    Labs were reviewed with the patient    Assessment:     1. Asymptomatic bacteriuria    2. CKD stage III (moderate)    3. Status post living-donor kidney transplantation    4. At risk for opportunistic infections    5. Hypomagnesemia    6. Prophylactic immunotherapy    7. Immunosuppressive management encounter following kidney transplant        Plan:   -CKD3 s/p kidney transplantation. Continue to monitor renal function and electrolytes, HTN, secondary hyperparathyroidism and other issues related to underlying ESRD.      -Continue tacrolimus-based immunosuppression. Goal 7-10.  Will continue to watch for signs, symptoms and levels from side effects or drug toxicity.      -Hyperkalemia - continue low-potassium diet and p.r.n. Kayexalate.  No need for chronic medication therapy at present.    -Hypophosphatemia - mild on no supplement.  Continue to monitor.    -Hypomagnesemia- will continue magnesium oxide 800 mg daily;  Will  tolerate mild asymptomatic low level d/t pill burden, DDI, and adverse SE     -OI prophylaxis:     -Bactrim d/c'd d/t hyperkalemia, start dapsone today since G6 PD acceptable.     -Valcyte study medication for CMV prophylaxis held last admit; OK to resume.    -Fatigue & weakness:   Please see last visit for additional details.  Will start Procrit 37780 units weekly, including today.  CMV PCR negative as of 02/15/2019, so sx are not CMV related.  I doubt mild hypomagnesemia would make her symptomatic, but will watch.  In fact, magnesium supplementation may be now causing diarrhea.    -  Diarrhea- Hemoccult and infectious stool studies ordered given her concern of coffee ground appearance to stool and concern of infectious etiology.    -hypertension - BP ok watch.    -h/o mood disorder:  Cont pre-txp meds    -anemia of ? CKD versus chronic disease versus drug effect.  Also has known iron deficiency.  Weekly Procrit ordered.    Follow-up:   Clinic: return to transplant clinic weekly for the first month after transplant; every 2 weeks during months 2-3; then at 6-, 9-, 12-, 18-, 24-, and 36- months post-transplant to reassess for complications from immunosuppression toxicity and monitor for rejection.  Annually thereafter.    Labs: since patient remains at high risk for rejection and drug-related complications that warrant close monitoring, labs will be ordered as follows: continue twice weekly CBC, renal panel, and drug level for first month; then same labs once weekly through 3rd month post-transplant.  Urine for UA and protein/creatinine ratio monthly.  Urine BK - PCR at 1-, 3-, 6-, 9-, 12-, 18-, 24-, and 36- months post-transplant.  Hepatic panel at 1-, 2-, 3-, 6-, 9-, 12-, 18-, 24-, and 36- months post-transplant.    Maureen Cabral MD

## 2019-02-27 NOTE — LETTER
February 27, 2019        Dalton Heaton  [address] [facility] [place]  [place] [address]  Phone: [phone]  Fax: [phone]             [facility]  [address]  [facility] [address]  Phone: [phone]   Patient: Andra Newell   MR Number: [id number]   YOB: 1960   Date of Visit: 2/27/2019       Dear Dr. Dalton Heaton    Thank you for referring Andra Newell to me for evaluation. Attached you will find relevant portions of my assessment and plan of care.    If you have questions, please do not hesitate to call me. I look forward to following Andra Newell along with you.    Sincerely,    Maureen Cabral MD    Enclosure    If you would like to receive this communication electronically, please contact [email or web address] or [phone] to request [employer] access.    [employer] is a tool which provides read-only access to select patient information with whom you have a relationship. Its easy to use and provides real time access to review your patients record including encounter summaries, notes, results, and demographic information.    If you feel you have received this communication in error or would no longer like to receive these types of communications, please e-mail [email or web address]

## 2019-02-27 NOTE — PROGRESS NOTES
[facility] CMV Prevention (Kidney Transplant)  Protocol: [id number]  IRB# [id number]  PI: Nicholas GUAN)  Site: [id number]  Subject #[id number]     VISIT 6 / WEEK6:       Date of Visit: 27/FEB/2019     Patient arrived at clinic to complete his V6-Wk6 visit. Protocol Labs were drawn in morning at 09:54 AM before study medication was taken from new study medication bottles. All specimens processed and shipped per protocol. Byron SARAVIA conducted visti in its entirety with Sub-I, Sharri Sands present. Patient agrees to continue his participation in the study and all questions answered.                                                                                                                                                                                                                                                                                                                                                          Changes in con-meds (Prograf). No new AEs have occurred when asked to subject.     Vital signs obtained sitting:  BP: 104/67  HR: 98  RR: 16  Wt: 99.2 kg  T: 98.3 F oral     Follow-up in 2 week for Week 6 visit. Informed to bring back bottles, old study medication, and dairy. Routine instructions on taking study medications and restrictions provided.    CMV Disease Assessment, Health Outcomes Assessment, Renal Transplant Outcomes, Health Outcomes Assessment and Child Pierre Score completed. Pt agreed to continue to follow compliance in regards to effective birth control and donation of sperm as required by protocol.       Diary collected and reviewed with subject. Pt stated that he took medications every day and did not miss any days. Pt stated no loss of drug. Current diary collected. New diary dispensed.      Pill bottles were brought to visit and collected. Drug returned:   MK-8228/Placebo -     8  VGCV/Placebo -         16  ACV/Placebo -            16     Old bottles returned to research  pharmacy.  re-educated the patient on the importance of bringing all bottles back into next visit. Subject was assigned new study drug bottles.       New study medication (3 bottles) dispensed to subject: [id number] and [id number] and [id number]. All 3 study medication taken in clinic from new bottles.     Detailed instructions to bring ALL bottles back to the site. Education and training provided on when to take study drug, and what not to eat/drink while taking study medication, etc.      Study participant reminded to call  for any questions. Reminder to bring diary and all pill bottles back to next appointment on 13/MAR/2019.

## 2019-02-28 ENCOUNTER — TELEPHONE (OUTPATIENT)
Dept: TRANSPLANT | Facility: CLINIC | Age: 59
End: 2019-02-28

## 2019-02-28 RX ORDER — CIPROFLOXACIN 500 MG/1
500 TABLET ORAL EVERY 12 HOURS
Qty: 14 TABLET | Refills: 1 | Status: SHIPPED | OUTPATIENT
Start: 2019-02-28 | End: 2019-03-11

## 2019-03-01 ENCOUNTER — PATIENT MESSAGE (OUTPATIENT)
Dept: TRANSPLANT | Facility: CLINIC | Age: 59
End: 2019-03-01

## 2019-03-01 ENCOUNTER — TELEPHONE (OUTPATIENT)
Dept: TRANSPLANT | Facility: CLINIC | Age: 59
End: 2019-03-01

## 2019-03-01 DIAGNOSIS — Z94.0 KIDNEY REPLACED BY TRANSPLANT: Primary | ICD-10-CM

## 2019-03-01 LAB — BACTERIA UR CULT: NORMAL

## 2019-03-01 NOTE — TELEPHONE ENCOUNTER
"  SW received the below message form pt. SW contacted , Nikole [phone] who reports she will contact pt regarding concern below. SW remains available.                "Oracio can you please have my  give me a call its about my insurance thanks Ysabel"  "

## 2019-03-01 NOTE — TELEPHONE ENCOUNTER
----- Message from Maureen Cabral MD sent at 2/28/2019  6:46 PM CST -----  The following message sent to patient via [facility]:   The repeat culture appears to also be growing bacteria, and I am concerned that this may be the same bacteria that you had in your bladder early in February.  As such, I would like to start cipro 500 mg BID.  -Will also need to consult ID and urology

## 2019-03-01 NOTE — TELEPHONE ENCOUNTER
Urine cultures reviewed.  It is noted Andra has grown Klebsiella pneumoniae ESBL on 01/30, to 11, and again this week 2/25.    Before I noted she repeatedly has grown same bacteria, I requested catheterized urine in clinic Wednesday as she reported no symptoms. This 4th urine is also growing >100k GNRs, identification pending.    I have verified with the patient by calling her tonight that she is asymptomatic without dysuria, frequency or urgency.  She remains afebrile and feeling okay, except for tired all the time.  I have sent his prescription to her [facility] for ciprofloxacin, which she states she will be able to fill tonight.  I advised that she stop taking iron temporarily, as she needs to space out the Cipro from magnesium and iron supplements.  - I advised Brigid she will need to see Urology  - ID consult also requested to ensure oral ciprofloxacin x7 days would be considered appropriate to eradicate this recurrent pathogen [I suspect she needs something more aggressive, either IV or longer po course].     She stated her partner will be able to  her antibiotic to night so she may take 1st dose this evening.  She is aware of the need to see infectious disease and urology.  Will forward this message to transplant coordinator who will facilitate getting appointments.

## 2019-03-04 ENCOUNTER — LAB VISIT (OUTPATIENT)
Dept: LAB | Facility: HOSPITAL | Age: 59
End: 2019-03-04
Attending: INTERNAL MEDICINE
Payer: MEDICARE

## 2019-03-04 ENCOUNTER — TELEPHONE (OUTPATIENT)
Dept: TRANSPLANT | Facility: CLINIC | Age: 59
End: 2019-03-04

## 2019-03-04 DIAGNOSIS — Z94.0 KIDNEY REPLACED BY TRANSPLANT: ICD-10-CM

## 2019-03-04 DIAGNOSIS — N18.9 ANEMIA IN CHRONIC KIDNEY DISEASE, UNSPECIFIED CKD STAGE: ICD-10-CM

## 2019-03-04 DIAGNOSIS — D63.1 ANEMIA IN CHRONIC KIDNEY DISEASE, UNSPECIFIED CKD STAGE: ICD-10-CM

## 2019-03-04 LAB
ALBUMIN SERPL BCP-MCNC: 4 G/DL
ANION GAP SERPL CALC-SCNC: 10 MMOL/L
ANISOCYTOSIS BLD QL SMEAR: SLIGHT
BASOPHILS # BLD AUTO: 0.03 K/UL
BASOPHILS NFR BLD: 0.4 %
BUN SERPL-MCNC: 20 MG/DL
CALCIUM SERPL-MCNC: 10.5 MG/DL
CHLORIDE SERPL-SCNC: 102 MMOL/L
CO2 SERPL-SCNC: 26 MMOL/L
CREAT SERPL-MCNC: 1.6 MG/DL
DIFFERENTIAL METHOD: ABNORMAL
EOSINOPHIL # BLD AUTO: 0.3 K/UL
EOSINOPHIL NFR BLD: 3.6 %
ERYTHROCYTE [DISTWIDTH] IN BLOOD BY AUTOMATED COUNT: 19.4 %
EST. GFR  (AFRICAN AMERICAN): 39.8 ML/MIN/1.73 M^2
EST. GFR  (NON AFRICAN AMERICAN): 34.5 ML/MIN/1.73 M^2
GLUCOSE SERPL-MCNC: 254 MG/DL
HCT VFR BLD AUTO: 31.6 %
HGB BLD-MCNC: 10.1 G/DL
HYPOCHROMIA BLD QL SMEAR: ABNORMAL
IMM GRANULOCYTES # BLD AUTO: 0.05 K/UL
IMM GRANULOCYTES NFR BLD AUTO: 0.7 %
LYMPHOCYTES # BLD AUTO: 0.3 K/UL
LYMPHOCYTES NFR BLD: 4.1 %
MAGNESIUM SERPL-MCNC: 1.6 MG/DL
MCH RBC QN AUTO: 30.8 PG
MCHC RBC AUTO-ENTMCNC: 32 G/DL
MCV RBC AUTO: 96 FL
MONOCYTES # BLD AUTO: 0.5 K/UL
MONOCYTES NFR BLD: 6.8 %
NEUTROPHILS # BLD AUTO: 5.9 K/UL
NEUTROPHILS NFR BLD: 84.4 %
NRBC BLD-RTO: 0 /100 WBC
OVALOCYTES BLD QL SMEAR: ABNORMAL
PHOSPHATE SERPL-MCNC: 3.7 MG/DL
PLATELET # BLD AUTO: 254 K/UL
PLATELET BLD QL SMEAR: ABNORMAL
PMV BLD AUTO: 10.1 FL
POIKILOCYTOSIS BLD QL SMEAR: SLIGHT
POLYCHROMASIA BLD QL SMEAR: ABNORMAL
POTASSIUM SERPL-SCNC: 4.9 MMOL/L
RBC # BLD AUTO: 3.28 M/UL
SODIUM SERPL-SCNC: 138 MMOL/L
WBC # BLD AUTO: 7.04 K/UL

## 2019-03-04 PROCEDURE — 36415 COLL VENOUS BLD VENIPUNCTURE: CPT | Mod: PO

## 2019-03-04 PROCEDURE — 85025 COMPLETE CBC W/AUTO DIFF WBC: CPT

## 2019-03-04 PROCEDURE — 83735 ASSAY OF MAGNESIUM: CPT

## 2019-03-04 PROCEDURE — 80197 ASSAY OF TACROLIMUS: CPT

## 2019-03-04 PROCEDURE — 80069 RENAL FUNCTION PANEL: CPT

## 2019-03-05 LAB — TACROLIMUS BLD-MCNC: 7.3 NG/ML

## 2019-03-06 ENCOUNTER — OFFICE VISIT (OUTPATIENT)
Dept: ENDOCRINOLOGY | Facility: CLINIC | Age: 59
End: 2019-03-06
Payer: MEDICARE

## 2019-03-06 VITALS
DIASTOLIC BLOOD PRESSURE: 76 MMHG | HEIGHT: 67 IN | TEMPERATURE: 98 F | SYSTOLIC BLOOD PRESSURE: 122 MMHG | WEIGHT: 213.5 LBS | BODY MASS INDEX: 33.51 KG/M2 | HEART RATE: 89 BPM

## 2019-03-06 DIAGNOSIS — E03.9 ACQUIRED HYPOTHYROIDISM: ICD-10-CM

## 2019-03-06 DIAGNOSIS — E55.9 VITAMIN D DEFICIENCY: ICD-10-CM

## 2019-03-06 DIAGNOSIS — Z79.4 TYPE 2 DIABETES MELLITUS WITH CHRONIC KIDNEY DISEASE, WITH LONG-TERM CURRENT USE OF INSULIN, UNSPECIFIED CKD STAGE: ICD-10-CM

## 2019-03-06 DIAGNOSIS — E78.2 MIXED HYPERLIPIDEMIA: Chronic | ICD-10-CM

## 2019-03-06 DIAGNOSIS — E11.22 TYPE 2 DIABETES MELLITUS WITH CHRONIC KIDNEY DISEASE, WITH LONG-TERM CURRENT USE OF INSULIN, UNSPECIFIED CKD STAGE: ICD-10-CM

## 2019-03-06 DIAGNOSIS — I10 ESSENTIAL HYPERTENSION: ICD-10-CM

## 2019-03-06 PROBLEM — T38.0X5S ADVERSE EFFECT OF ADRENAL CORTICAL STEROIDS, SEQUELA: Status: RESOLVED | Noted: 2019-01-15 | Resolved: 2019-03-06

## 2019-03-06 PROBLEM — R79.89 LOW VITAMIN D LEVEL: Status: RESOLVED | Noted: 2018-08-27 | Resolved: 2019-03-06

## 2019-03-06 PROBLEM — R73.9 HYPERGLYCEMIA: Status: RESOLVED | Noted: 2017-04-12 | Resolved: 2019-03-06

## 2019-03-06 PROCEDURE — 99214 OFFICE O/P EST MOD 30 MIN: CPT | Mod: PBBFAC,PO | Performed by: NURSE PRACTITIONER

## 2019-03-06 PROCEDURE — 99499 RISK ADDL DX/OHS AUDIT: ICD-10-PCS | Mod: S$PBB,,, | Performed by: NURSE PRACTITIONER

## 2019-03-06 PROCEDURE — 99999 PR PBB SHADOW E&M-EST. PATIENT-LVL IV: ICD-10-PCS | Mod: PBBFAC,,, | Performed by: NURSE PRACTITIONER

## 2019-03-06 PROCEDURE — 99999 PR PBB SHADOW E&M-EST. PATIENT-LVL IV: CPT | Mod: PBBFAC,,, | Performed by: NURSE PRACTITIONER

## 2019-03-06 PROCEDURE — 99499 UNLISTED E&M SERVICE: CPT | Mod: S$PBB,,, | Performed by: NURSE PRACTITIONER

## 2019-03-06 PROCEDURE — 99214 OFFICE O/P EST MOD 30 MIN: CPT | Mod: S$PBB,,, | Performed by: NURSE PRACTITIONER

## 2019-03-06 PROCEDURE — 99214 PR OFFICE/OUTPT VISIT, EST, LEVL IV, 30-39 MIN: ICD-10-PCS | Mod: S$PBB,,, | Performed by: NURSE PRACTITIONER

## 2019-03-06 RX ORDER — PEN NEEDLE, DIABETIC 30 GX3/16"
NEEDLE, DISPOSABLE MISCELLANEOUS
Qty: 400 EACH | Refills: 3 | Status: SHIPPED | OUTPATIENT
Start: 2019-03-06 | End: 2019-03-14 | Stop reason: SDUPTHER

## 2019-03-06 RX ORDER — INSULIN GLARGINE 100 [IU]/ML
30 INJECTION, SOLUTION SUBCUTANEOUS NIGHTLY
Qty: 15 ML | Refills: 0
Start: 2019-03-06 | End: 2019-06-28

## 2019-03-06 RX ORDER — INSULIN ASPART 100 [IU]/ML
INJECTION, SOLUTION INTRAVENOUS; SUBCUTANEOUS
Qty: 15 ML | Refills: 12 | Status: ON HOLD | OUTPATIENT
Start: 2019-03-06 | End: 2019-04-05 | Stop reason: SDUPTHER

## 2019-03-06 NOTE — PROGRESS NOTES
CC: This 59 y.o. female presents for management of diabetes  and chronic conditions pending review including HTN, HLP, ESRD s/p kidney txp 01/14/2019, hypothyroidism, vitamin d deficiency, obesity     HPI: She  was diagnosed with T2DM in 2005. Has never been hospitalized r/t DM.  Family hx of DM: grandmother     hypoglycemia at home- none  monitoring BG at home: 4 times a day            Diet: Eats 2-3  Meals a day, snacks- not often- appetite decreased since txp   May only eat cheese for lunch  Exercise: none- needs left hip replacement,   CURRENT DM MEDS: januvia 100 mg qd, basaglar 28 u qhs, Novolog per correction   Vial/pen:  Uses pens  Glucometer type:  Relion    Standards of Care:  Eye exam: 6/2018, No DR, [facility]    ROS:   Gen: Appetite good, +weight loss 14 lbs, + fatigue   Skin: Skin is intact and heals well, no rashes, no hair changes  Eyes: Denies visual disturbances  Resp: BOYER, no cough  Cardiac: No palpitations, chest pain, no edema   GI: No nausea or vomiting, diarrhea, constipation, or abdominal pain.  /GYN: 1-2+ nocturia, burning or pain.   MS/Neuro: Denies numbness/ tingling in BLE; Gait steady, speech clear  Psych: Denies drug/ETOH abuse + depression.  Other systems: negative.    PE:  GENERAL: Well developed, well nourished.  PSYCH: AAOx3, appropriate mood and affect, pleasant expression, conversant, appears relaxed, well groomed.   EYES: Conjunctiva, corneas clear    NECK: Supple, trachea midline  ABDOMEN: Soft, non-tender, non-distended   VASCULAR: DP pulses +2/4 bilaterally, no edema.  NEURO: Gait steady  SKIN: Skin warm and dry; no acanthosis nigracans.     Lab Results   Component Value Date    MICALBCREAT 1297.8 (H) 07/02/2018       Hemoglobin A1C   Date Value Ref Range Status   02/04/2019 6.5 (H) 4.0 - 5.6 % Final     Comment:     ADA Screening Guidelines:  5.7-6.4%  Consistent with prediabetes  >or=6.5%  Consistent with diabetes  High levels of fetal hemoglobin interfere with the  HbA1C  assay. Heterozygous hemoglobin variants (HbS, HgC, etc)do  not significantly interfere with this assay.   However, presence of multiple variants may affect accuracy.     01/09/2019 6.6 (H) 4.0 - 5.6 % Final     Comment:     ADA Screening Guidelines:  5.7-6.4%  Consistent with prediabetes  >or=6.5%  Consistent with diabetes  High levels of fetal hemoglobin interfere with the HbA1C  assay. Heterozygous hemoglobin variants (HbS, HgC, etc)do  not significantly interfere with this assay.   However, presence of multiple variants may affect accuracy.     09/10/2018 7.8 (H) 4.0 - 5.6 % Final     Comment:     ADA Screening Guidelines:  5.7-6.4%  Consistent with prediabetes  >or=6.5%  Consistent with diabetes  High levels of fetal hemoglobin interfere with the HbA1C  assay. Heterozygous hemoglobin variants (HbS, HgC, etc)do  not significantly interfere with this assay.   However, presence of multiple variants may affect accuracy.          ASSESSMENT and PLAN:      1. T2DM with hyperglycemia, CKD 3-   D/c januvia  Increase basaglar to 30 u qhs; Start Novolog 6 u AC + correction with meals  Discussed A1c and BG goals.  Continue to test bg 4 times a day- log in 1 week for insulin titration or sooner for issues   Reviewed  hypoglycemia, s/s and appropriate tx.  RX for Continuous Glucose Monitor   -Recommend Cody  Continuous Glucose monitor                         Pt tests glucoses a minimum of 4 x a day.                          Pt would benefit from therapeutic continuous glucose monitor to be able                         to make frequent insulin adjustments, based on current glucoses.   - takes statin    2. HTN - controlled, continue meds as previously prescribed and monitor.     3. HLP - on statin therapy, LFTs WNL    4. S/p kidney txp- followed by [facility]    5.  Immunosuppression - agents may increase bg readings    6. Hypothyroidism - takes with all other meds, Check tsh f t4 w Monday labs    7. Vitamin d deficiency -  continue ergo weekly    8. Obesity- Body mass index is 33.44 kg/m².  Unable to exercise r/t injury       Follow-up: in 3 months with lab prior

## 2019-03-07 ENCOUNTER — OFFICE VISIT (OUTPATIENT)
Dept: INFECTIOUS DISEASES | Facility: CLINIC | Age: 59
End: 2019-03-07
Payer: MEDICARE

## 2019-03-07 VITALS
HEIGHT: 67 IN | TEMPERATURE: 98 F | HEART RATE: 98 BPM | DIASTOLIC BLOOD PRESSURE: 79 MMHG | BODY MASS INDEX: 34.08 KG/M2 | WEIGHT: 217.13 LBS | SYSTOLIC BLOOD PRESSURE: 158 MMHG

## 2019-03-07 DIAGNOSIS — A49.8 INFECTION WITH ESBL KLEBSIELLA OXYTOCA: ICD-10-CM

## 2019-03-07 DIAGNOSIS — Z91.89 AT RISK FOR OPPORTUNISTIC INFECTIONS: ICD-10-CM

## 2019-03-07 DIAGNOSIS — N39.0 RECURRENT UTI: ICD-10-CM

## 2019-03-07 DIAGNOSIS — Z94.0 STATUS POST LIVING-DONOR KIDNEY TRANSPLANTATION: Primary | ICD-10-CM

## 2019-03-07 DIAGNOSIS — Z16.12 INFECTION WITH ESBL KLEBSIELLA OXYTOCA: ICD-10-CM

## 2019-03-07 DIAGNOSIS — Z79.899 LONG TERM CURRENT USE OF IMMUNOSUPPRESSIVE DRUG: ICD-10-CM

## 2019-03-07 PROCEDURE — 99213 OFFICE O/P EST LOW 20 MIN: CPT | Mod: PBBFAC | Performed by: INTERNAL MEDICINE

## 2019-03-07 PROCEDURE — 99999 PR PBB SHADOW E&M-EST. PATIENT-LVL III: ICD-10-PCS | Mod: PBBFAC,,, | Performed by: INTERNAL MEDICINE

## 2019-03-07 PROCEDURE — 99214 PR OFFICE/OUTPT VISIT, EST, LEVL IV, 30-39 MIN: ICD-10-PCS | Mod: S$PBB,,, | Performed by: INTERNAL MEDICINE

## 2019-03-07 PROCEDURE — 99214 OFFICE O/P EST MOD 30 MIN: CPT | Mod: S$PBB,,, | Performed by: INTERNAL MEDICINE

## 2019-03-07 PROCEDURE — 99499 RISK ADDL DX/OHS AUDIT: ICD-10-PCS | Mod: S$PBB,,, | Performed by: INTERNAL MEDICINE

## 2019-03-07 PROCEDURE — 99499 UNLISTED E&M SERVICE: CPT | Mod: S$PBB,,, | Performed by: INTERNAL MEDICINE

## 2019-03-07 PROCEDURE — 99999 PR PBB SHADOW E&M-EST. PATIENT-LVL III: CPT | Mod: PBBFAC,,, | Performed by: INTERNAL MEDICINE

## 2019-03-07 NOTE — Clinical Note
Interestingly, the patient does not have any symptomatic relief with antibiotic course.  She will be seeing urology tomorrow.  She mentioned a repeat urinalysis with urine culture will be performed next week - please forward the results to both me and Derrick.  My plan was to just retreat her with a longer 14 day course of antibiotics.  Depending on the susceptibility she may need ertapenem through a midline.  Please also forward results to Derrick as I will be out of town and I will not be able to act on it once results return.  Thanks

## 2019-03-07 NOTE — PROGRESS NOTES
Subjective:      Patient ID: Andra Newell is a 59 y.o. female.    Chief Complaint: Recurrent UTI    History of Present Illness  59-year-old female with history of DM/HTN/antibiotic induced nephropathy c/b ESRD living kidney transplant (friend) on 1/14/2019 with campath induction, now on MMR, tacro for maintenance, CMV D+/R- in [employer] CMV Prevention trial, presents for evaluation of recurrent UTI.    She has urinary urgency, completes urinating, but still has urinary urgency.  Patient reports feeling bladder pressure.  Denied any hematuria, dysuria, fevers, chills, sweats.  She has been treated with 3 courses of antibiotics, initially cefpodoxime 200 mg PO BID x5 days, then amox-clav 875-125 mg PO BID x7 days, currently on cipro 500 mg PO BID x 7 days.  She reports only minimal relief in symptoms during antibiotic course and after completing antibiotics.  All cultures have grown ESBL Klebsiella.  Urinary stent has been removed.  Denied having any adverse side effects related to antibiotics.  Denied any n/v/d, abdominal pain.  She denied having any history of recurrent UTIs prior to transplant.    Review of Systems   Constitution: Positive for decreased appetite, weakness, malaise/fatigue and weight loss. Negative for chills, fever, night sweats and weight gain.   HENT: Positive for hearing loss. Negative for congestion, ear pain, hoarse voice, sore throat and tinnitus.    Eyes: Positive for blurred vision. Negative for redness and visual disturbance.   Cardiovascular: Negative for chest pain, leg swelling and palpitations.   Respiratory: Positive for shortness of breath. Negative for cough, hemoptysis and sputum production.    Hematologic/Lymphatic: Negative for adenopathy. Bruises/bleeds easily.   Skin: Negative for dry skin, itching, rash and suspicious lesions.   Musculoskeletal: Positive for back pain, joint pain, myalgias and neck pain.   Gastrointestinal: Negative for abdominal pain, constipation,  diarrhea, heartburn, nausea and vomiting.   Genitourinary: Positive for urgency. Negative for dysuria, flank pain, frequency, hematuria and hesitancy.   Neurological: Negative for dizziness, headaches, numbness and paresthesias.   Psychiatric/Behavioral: Positive for depression. Negative for memory loss. The patient is nervous/anxious. The patient does not have insomnia.      Objective:   Physical Exam   Constitutional: She is oriented to person, place, and time. She appears well-developed and well-nourished. No distress.   HENT:   Head: Normocephalic and atraumatic.   Eyes: Conjunctivae and EOM are normal.   Neck: Normal range of motion. Neck supple.   Pulmonary/Chest: Effort normal. No respiratory distress.   Abdominal: Soft. She exhibits no distension.   Musculoskeletal: Normal range of motion. She exhibits no edema.   Neurological: She is alert and oriented to person, place, and time.   Skin: Skin is warm and dry. No rash noted. She is not diaphoretic. No erythema.   Psychiatric: She has a normal mood and affect. Her behavior is normal.   Vitals reviewed.    Urine cultures    2/27/2019  Urine Culture, Routine KLEBSIELLA PNEUMONIAE ESBL   >100,000 cfu/ml       Resulting Agency [facility]   Susceptibility      Klebsiella pneumoniae esbl     CULTURE, URINE     Amikacin <=16  Sensitive     Amox/K Clav'ate <=8/4  Sensitive     Amp/Sulbactam <=8/4  Sensitive     Ampicillin >16  Resistant     Cefazolin >16  Resistant     Cefepime <=8  Resistant     Ceftriaxone 32  Resistant     Ciprofloxacin <=1  Sensitive     Ertapenem <=2  Sensitive     Gentamicin >8  Resistant     Meropenem <=4  Sensitive     Nitrofurantoin 64  Intermediate     Piperacillin/Tazo <=16  Sensitive     Tetracycline <=4  Sensitive     Tobramycin 8  Intermediate     Trimeth/Sulfa >2/38  Resistant           2/25/2019   Klebsiella pneumoniae esbl     CULTURE, URINE     Amikacin <=16  Sensitive     Amox/K Clav'ate <=8/4  Sensitive     Amp/Sulbactam <=8/4   Sensitive     Ampicillin >16  Resistant     Cefazolin >16  Resistant     Cefepime <=8  Resistant     Ceftriaxone 32  Resistant     Ciprofloxacin <=1  Sensitive     Ertapenem <=2  Sensitive     Gentamicin >8  Resistant     Meropenem <=4  Sensitive     Nitrofurantoin 64  Intermediate     Piperacillin/Tazo <=16  Sensitive     Tetracycline <=4  Sensitive     Tobramycin 8  Intermediate     Trimeth/Sulfa >2/38  Resistant      2/11/2019  Urine Culture, Routine KLEBSIELLA PNEUMONIAE ESBL   >100,000 cfu/ml       Resulting Agency [facility]   Susceptibility      Klebsiella pneumoniae esbl     CULTURE, URINE     Amox/K Clav'ate <=8/4  Sensitive     Amp/Sulbactam 16/8  Intermediate     Ampicillin >16  Resistant     Cefazolin >16  Resistant     Cefepime <=8  Resistant     Ceftriaxone 32  Resistant     Ciprofloxacin 2  Intermediate     Ertapenem <=2  Sensitive     Gentamicin 8  Intermediate     Meropenem <=4  Sensitive     Nitrofurantoin >64  Resistant     Piperacillin/Tazo <=16  Sensitive     Tetracycline >8  Resistant     Tobramycin 8  Intermediate     Trimeth/Sulfa >2/38  Resistant         1/30/2019   Klebsiella pneumoniae esbl     CULTURE, URINE     Amox/K Clav'ate <=8/4  Sensitive     Amp/Sulbactam <=8/4  Sensitive     Ampicillin >16  Resistant     Cefazolin >16  Resistant     Cefepime <=8  Resistant     Ceftriaxone 32  Resistant     Ciprofloxacin <=1  Sensitive     Ertapenem <=2  Sensitive     Gentamicin 8  Intermediate     Meropenem <=4  Sensitive     Nitrofurantoin 64  Intermediate     Piperacillin/Tazo <=16  Sensitive     Tetracycline <=4  Sensitive     Tobramycin 8  Intermediate     Trimeth/Sulfa >2/38  Resistant          Assessment:   59-year-old female with history of DM/HTN/antibiotic induced nephropathy c/b ESRD living kidney transplant (friend) on 1/14/2019 with campath induction, now on MMR, tacro for maintenance, CMV D+/R- in [facility] CMV Prevention trial, presents for evaluation of recurrent ESBL Klebsiella UTI.    Plan:    - Complete 7 day course of ciprofloxacin 500 mg PO BID  - Agree with urology consult planned for 3/8/2019  - Plans for repeat urine culture on 3/11/2019 - if positive again, will treat again with longer course of antibiotics, plan for 14 day course.  Depending on susceptibility pattern, may need to start on ertapenem through midline.     Cammie Kessler MD MPH  Infectious Diseases [facility]

## 2019-03-08 ENCOUNTER — INITIAL CONSULT (OUTPATIENT)
Dept: UROGYNECOLOGY | Facility: CLINIC | Age: 59
End: 2019-03-08
Attending: INTERNAL MEDICINE
Payer: MEDICARE

## 2019-03-08 VITALS
DIASTOLIC BLOOD PRESSURE: 78 MMHG | WEIGHT: 215.63 LBS | BODY MASS INDEX: 33.84 KG/M2 | HEIGHT: 67 IN | SYSTOLIC BLOOD PRESSURE: 122 MMHG

## 2019-03-08 DIAGNOSIS — N39.46 MIXED URGE AND STRESS INCONTINENCE: ICD-10-CM

## 2019-03-08 DIAGNOSIS — N95.2 VAGINAL ATROPHY: ICD-10-CM

## 2019-03-08 DIAGNOSIS — R39.15 URINARY URGENCY: ICD-10-CM

## 2019-03-08 DIAGNOSIS — N39.0 RECURRENT UTI: Primary | ICD-10-CM

## 2019-03-08 PROCEDURE — 99215 PR OFFICE/OUTPT VISIT, EST, LEVL V, 40-54 MIN: ICD-10-PCS | Mod: S$PBB,,, | Performed by: OBSTETRICS & GYNECOLOGY

## 2019-03-08 PROCEDURE — 99215 OFFICE O/P EST HI 40 MIN: CPT | Mod: S$PBB,,, | Performed by: OBSTETRICS & GYNECOLOGY

## 2019-03-08 PROCEDURE — 87086 URINE CULTURE/COLONY COUNT: CPT

## 2019-03-08 PROCEDURE — 99215 OFFICE O/P EST HI 40 MIN: CPT | Mod: PBBFAC | Performed by: OBSTETRICS & GYNECOLOGY

## 2019-03-08 PROCEDURE — 99499 RISK ADDL DX/OHS AUDIT: ICD-10-PCS | Mod: S$PBB,,, | Performed by: OBSTETRICS & GYNECOLOGY

## 2019-03-08 PROCEDURE — 99499 UNLISTED E&M SERVICE: CPT | Mod: S$PBB,,, | Performed by: OBSTETRICS & GYNECOLOGY

## 2019-03-08 PROCEDURE — 99999 PR PBB SHADOW E&M-EST. PATIENT-LVL V: ICD-10-PCS | Mod: PBBFAC,,, | Performed by: OBSTETRICS & GYNECOLOGY

## 2019-03-08 PROCEDURE — 99999 PR PBB SHADOW E&M-EST. PATIENT-LVL V: CPT | Mod: PBBFAC,,, | Performed by: OBSTETRICS & GYNECOLOGY

## 2019-03-08 RX ORDER — ESTRADIOL 0.1 MG/G
0.5 CREAM VAGINAL
Qty: 45 G | Refills: 4 | Status: SHIPPED | OUTPATIENT
Start: 2019-03-11 | End: 2019-06-10

## 2019-03-08 NOTE — LETTER
March 10, 2019      Maureen Cabral MD  [address]  [facility] [address]           [facility]  [address] [address]  Phone: [phone]          Patient: Andra Newell   MR Number: [id number]   YOB: 1960   Date of Visit: 3/8/2019       Dear Dr. Maureen Cabral:    Thank you for referring Andra Newell to me for evaluation. Attached you will find relevant portions of my assessment and plan of care.    If you have questions, please do not hesitate to call me. I look forward to following Andra Newell along with you.    Sincerely,    Jacqui Artis, DO [place]  CC:  No Recipients    If you would like to receive this communication electronically, please contact [email or web address] or [phone] to request more information on [facility] access.    For providers and/or their staff who would like to refer a patient to [facility], please contact us through our one-stop-shop provider referral line, [facility], at [phone].    If you feel you have received this communication in error or would no longer like to receive these types of communications, please e-mail [email or web address]

## 2019-03-08 NOTE — PROGRESS NOTES
Subjective:       Patient ID: Andra Newell is a 59 y.o. female.    Chief Complaint:  recurrent uti and Urinary Incontinence      History of Present Illness  HPI     59-year-old female with history of DM/HTN/antibiotic induced nephropathy c/b ESRD living kidney transplant (friend) on 1/14/2019 with campath induction, now on MMR, tacro for maintenance, CMV D+/R- in [employer] CMV Prevention trial,  Referred by Dr. Millard for evaluation of recurrent UTI. She has urinary urgency,  Mixed incontinence, urge predominant.   Patient reports feeling bladder pressure and at times pain not associated with urination.  Denied any hematuria, dysuria, fevers, chills, sweats.  She has been treated with 3 courses of antibiotics, initially cefpodoxime 200 mg PO BID x5 days, then amox-clav 875-125 mg PO BID x7 days, currently on cipro 500 mg PO BID x 7 days.  She reports only minimal relief in symptoms during antibiotic course and after completing antibiotics.  All cultures have grown ESBL Klebsiella.  Urinary stent has been removed.  Denied having any adverse side effects related to antibiotics.  Denied any n/v/d, abdominal pain.  She denied having any history of recurrent UTIs prior to transplant.    [facility]     Question 3/8/2019  9:54 AM CST - Filed by Patient on 3/8/2019   General Urogynecology: Are you experiencing the following?    Dysuria (painful urination) No   Nocturia:  waking up at night to empty your bladder  Yes   If you answered yes to the previous question, how many times does this happen per night? 1-2   Enuresis (urine loss during sleep) No   Dribbling urine after you urinate Yes   Hematuria (urine appears red) No   Type of stream Weak   Urinary frequency: How often a day are you going to the bathroom per day?  10-15   Urinary Tract Infections: How many Urinary Tract Infections have you had in the past year? Three (3) infections in a year   If you have had a UTI in the past year, what treatments  "have you had so far?  Other   Urinary Incontinence (General): Are you experiencing the following?    Past consultation for incontinence: Have you ever seen someone for the evaluation of incontinence? Yes   If you answered yes to the previous question, please select all the therapies you have tried.  None of the above   Please note the effectiveness of the therapies. Ineffective   Need to wear protection to keep clothes dry  No   If you answered yes to the previous question, please misa the protection you use.  None   If you wear protection, how much wetness is typically on each pad? N/a- I do not wear protection   If you wear protection, how often do you have to change per day, if applicable?  0   Stress Symptoms: Are you experiencing the following?    Leakage of urine with cough, laugh and/or sneeze Yes   If you answered yes to the previous question, what is the frequency in days, weeks and/or months? Never   Leakage of urine with sex No   Leakage of urine with bending/ lifting Yes   Leakage of urine with briskly walking or jogging No   If you lose urine for any other reason not previously mentioned, please note it below, if applicable.     Urge Symptoms: Are you experiencing the following?    Urgency ("got to go" feeling) Yes   Urge: How frequently do you feel an urge to urinate (feeling like you "gotta go" to the bathroom and can't wait) Several times a day   Do you experience a leakage of urine when you have a feeling of urgency?  No   Leakage of urine when unaware No   Past use of anticholinergics (medications used to treat overactive bladder) No   If you answered yes to the previous question, please misa the anticholinergics you have used:     Have you ever used Mirbetriq (aka Mirabegron)?  No   Prolapse Symptoms: Are you experiencing any of the following?     Falling out/ Bulging/ Heaviness in the vagina No   Vaginal/ Abdominal Pain/ Pressure Yes   Need to strain/ Push to void Yes   Need to wait on the toilet " before you void No   Unusual position to urinate (using your hands to push back the vaginal bulge) Yes   Sensation of incomplete emptying Yes   Past use of pessary device No   If you answered yes to the previous question, please list the devices you have used below.     Bowel Symptoms: Are you experiencing any of the following?    Constipation No   Diarrhea  No   Hematochezia (bloody stool) No   Incomplete evacuation of stool No   Involuntary loss of formed stool Yes   Fecal smearing/urgency No   Involuntary loss of gas No   Vaginal Symptoms: Are you experiencing any of the following?     Abnormal vaginal bleeding  No   Vaginal dryness No   Sexually active  No   Dyspareunia (painful intercourse) No   Estrogen use  No     GYN & OB History  Patient's last menstrual period was 2012.   Date of Last Pap: 2018    OB History    Para Term  AB Living   2 2 2     2   SAB TAB Ectopic Multiple Live Births           2      # Outcome Date GA Lbr Felice/2nd Weight Sex Delivery Anes PTL Lv   2 Term      Vag-Spont   YULISA   1 Term      Vag-Spont   YULISA        OB  Largest: 9  Forceps: yes   Episiotomy:  yes    GYN  Menarche: 11  Menstrual cycle: 48  Menopause: Yes  Hysterectomy: present   Ovaries: present     Past Medical History:   Diagnosis Date    Anemia     Anemia in chronic kidney disease, on chronic dialysis 2017    Arthritis     Asthma     allergic airway    CKD stage III (moderate) 2019    Colon polyp     Depression     Diabetes mellitus     Diverticulosis     Eosinophilia     ESRD (end stage renal disease)     stage V, due for living donor 2018    ESRD on dialysis     GERD (gastroesophageal reflux disease)     Gout     Gout     Hyperlipidemia     Hypertension     Low back pain     MRSA carrier     Obesity     Renal disorder     Rotator cuff syndrome--left     Thyroid disease     Ulnar neuropathy of right upper extremity     Uncontrolled type 2 diabetes mellitus with  hyperglycemia      Past Surgical History:   Procedure Laterality Date    ACHILLES TENDON SURGERY Left May 2015    ARTHROSCOPY, HIP Left 10/3/2013    [facility]    ARTHROSCOPY-HIP WITH TROCHANTERIC BURSECTOMY Left 10/3/2013    [facility]    ARTHROSCOPY-SHOULDER Left 11/24/2015    [facility]    ARTHROSCOPY-SHOULDER WITH SUBACROMIAL DECOMPRESSION Left 7/12/2016    [facility]    BACK SURGERY      CHOLECYSTECTOMY      COLONOSCOPY N/A 9/6/2017    [facility]    DEBRIDEMENT-SHOULDER-ARTHROSCOPIC Left 7/12/2016    [facility]    DEBRIDEMENT-SHOULDER-ARTHROSCOPIC; LABRAL Left 11/24/2015    [facility]    DIALYSIS FISTULA CREATION  12/2017    FEMOROPLASTY, ARTHROSCOPIC Left 10/3/2013    [facility]    HEMORRHOID SURGERY      INJECTION-AMNIOX Left 7/12/2016    [facility]    JOINT REPLACEMENT      left    KNEE SURGERY      LYSIS-ADHESION Left 11/24/2015    [facility]    REPAIR ROTATOR CUFF ARTHROSCOPIC Left 7/12/2016    [facility]    REPAIR-TENDON-BICEP Left 11/24/2015    [facility]    SHOULDER ARTHROSCOPY      SHOULDER SURGERY      TRANSPLANT, KIDNEY N/A 1/14/2019    [facility] [address] [place] [address]    UPPER GASTROINTESTINAL ENDOSCOPY  ~2013    Dr. Moralez     Review of patient's allergies indicates:   Allergen Reactions    Torsemide Diarrhea     Diarrhea stopped once discontinued med    Codeine Itching     Can take oxycodone and hydrocodone with Benadryl       Current Outpatient Medications:     atorvastatin (LIPITOR) 40 MG tablet, TAKE ONE TABLET BY MOUTH ONCE DAILY, Disp: 90 tablet, Rfl: 0    atovaquone  (MEPRON) 750 mg/5 mL Susp, Take 10 mLs (1,500 mg total) by mouth once daily. Stop: 1/14/20, Disp: 300 mL, Rfl: 11    ciprofloxacin HCl (CIPRO) 500 MG tablet, Take 1 tablet (500 mg total) by mouth every 12 (twelve) hours., Disp: 14 tablet, Rfl: 1    ergocalciferol (ERGOCALCIFEROL) 50,000 unit Cap, Take 1 capsule (50,000 Units total) by mouth every 7 days. Take on Tues, Disp: 4 capsule, Rfl: 2    famotidine (PEPCID) 20 MG tablet, Take 1 tablet (20 mg total) by mouth 2 (two) times daily., Disp: 60 tablet, Rfl: 11    flash glucose scanning reader (FREESTYLE DAKOTAH 14 DAY READER) Misc, Dispense 1 reader, Disp: 1 each, Rfl: 0    flash glucose sensor (FREESTYLE DAKOTAH 14 DAY SENSOR) Kit, Uses 1 kit monthly, Disp: 3 kit, Rfl: 3    insulin (BASAGLAR KWIKPEN U-100 INSULIN) glargine 100 units/mL (3mL) SubQ pen, Inject 30 Units into the skin every evening., Disp: 15 mL, Rfl: 0    insulin aspart U-100 (NOVOLOG FLEXPEN U-100 INSULIN) 100 unit/mL InPn pen, Novolog 6 u AC + correction Max TDD 40u, Disp: 15 mL, Rfl: 12    INV acyclovir/placebo capsule, Take 1 capsule (400 mg total) by mouth every 12 (twelve) hours.  For Investigational Use Only. Protocol MK-8228-002. Patient Study # [id number] ([id number])., Disp: 34 capsule, Rfl: 0    INV MK-8228/placebo 480 mg oral tablet, Take 1 tablet (480 mg total) by mouth once daily. FOR INVESTIGATIONAL USE ONLY. Protocol MK-8228-002. Patient Study# [id number] ([id number])., Disp: 17 tablet, Rfl: 0    INV valganciclovir/placebo tablet, Take 2 tablets (900 mg total) by mouth once daily.  FOR INVESTIGATIONAL USE ONLY. Protocol MK-8228-002. Patient Study # [id number] ([id number])., Disp: 34 tablet, Rfl: 0    levothyroxine (SYNTHROID) 75 MCG tablet, TAKE ONE TABLET BY MOUTH ONCE DAILY, Disp: 30 tablet, Rfl: 4    LORazepam (ATIVAN) 2 MG Tab, Take 1 tablet (2 mg total) by mouth 2 (two) times daily. (Patient taking differently: Take 1 mg by mouth 2 (two) times daily. ), Disp: 60 tablet, Rfl: 2     "magnesium oxide (MAG-OX) 400 mg (241.3 mg magnesium) tablet, Take 2 tablets (800 mg total) by mouth once daily., Disp: 60 tablet, Rfl: 11    multivitamin (THERAGRAN) tablet, Take 1 tablet by mouth once daily., Disp: , Rfl:     pen needle, diabetic (BD ULTRA-FINE MINI PEN NEEDLE) 31 gauge x 3/16" Ndle, USES 4  DAILY, Disp: 400 each, Rfl: 3    tacrolimus (PROGRAF) 0.5 MG Cap, Take 2 capsules (1 mg total) by mouth every morning AND 2 capsules (1 mg total) every evening. Z94.0 kidney transplant. (Patient taking differently: 1 capsules 2 times a day), Disp: 120 capsule, Rfl: 11    VIIBRYD 40 mg Tab tablet, TAKE ONE TABLET BY MOUTH ONCE DAILY, Disp: 30 tablet, Rfl: 4    [START ON 3/11/2019] estradiol (ESTRACE) 0.01 % (0.1 mg/gram) vaginal cream, Place 0.5 g vaginally twice a week. Apply to the vagina daily for two weeks then twice a week., Disp: 45 g, Rfl: 4    Review of Systems  Review of Systems   Constitutional: Negative.  Negative for activity change, appetite change, chills, diaphoresis, fatigue, fever and unexpected weight change.   HENT: Negative.    Eyes: Negative.    Respiratory: Negative.  Negative for cough.    Cardiovascular: Negative.    Gastrointestinal: Positive for constipation. Negative for abdominal distention, abdominal pain, anal bleeding, blood in stool, diarrhea, nausea, rectal pain and vomiting.   Endocrine: Negative.    Genitourinary: Positive for dysuria and urgency. Negative for decreased urine volume, difficulty urinating, dyspareunia, enuresis, flank pain, frequency, genital sores, hematuria, menstrual problem, pelvic pain, vaginal bleeding, vaginal discharge and vaginal pain.   Musculoskeletal: Negative for back pain.   Skin: Negative for color change, pallor, rash and wound.   Allergic/Immunologic: Negative for environmental allergies, food allergies and immunocompromised state.   Hematological: Negative for adenopathy. Does not bruise/bleed easily.   Psychiatric/Behavioral: Negative for " agitation, behavioral problems, confusion and sleep disturbance.           Objective:     Physical Exam   Constitutional: She is oriented to person, place, and time. She appears well-developed.   HENT:   Head: Normocephalic and atraumatic.   Eyes: Conjunctivae and EOM are normal.   Neck: Normal range of motion. Neck supple.   Cardiovascular: Normal rate, regular rhythm, S1 normal, S2 normal, normal heart sounds and intact distal pulses.   Pulmonary/Chest: Effort normal and breath sounds normal. She exhibits no tenderness.   Abdominal: Soft. Bowel sounds are normal. She exhibits no distension and no mass. There is no hepatosplenomegaly, splenomegaly or hepatomegaly. There is no tenderness. There is no rigidity, no rebound, no guarding and no CVA tenderness. No hernia.   Genitourinary: Pelvic exam was performed with patient supine. Rectum normal, vagina normal, uterus normal, cervix normal, skenes normal and bartholins normal. Right labia normal and left labia normal. Urethra exhibits hypermobility. Urethra exhibits no urethral caruncle, no urethral diverticulum and no urethral mass. Right bartholin is not enlarged and not tender. Left bartholin is not enlarged and not tender. Rectal exam shows resting tone normal and active tone normal. Rectal exam shows no external hemorrhoid, no fissure, no tenderness, anal tone normal and no dovetailing. Guaiac negative stool. There is a cystocele and atrophy in the vagina. No foreign body, tenderness, bleeding, unspecified prolapse of vaginal walls, fistula, mesh exposure or lavator tenderness in the vagina. No vaginal discharge found. Right adnexum displays no mass and no tenderness. Left adnexum displays no mass and no tenderness. Cervix exhibits no motion tenderness and no discharge. Uterus is not tender and not experiencing uterine prolapse.   PVR: 60 ML  Empty cough stress test: Negative.  Kegel: 1/5    POP-Q  Aa: -1 Ba: -1 C: -4   GH: 3 PB: 2 TVL: 9   Ap: 0 Bp: 0 D: -5                      Musculoskeletal: Normal range of motion.   Lymphadenopathy:     She has no axillary adenopathy.        Right: No inguinal adenopathy present.        Left: No inguinal adenopathy present.   Neurological: She is alert and oriented to person, place, and time. She has normal strength and normal reflexes. Cranial nerves II through XII intact. No cranial nerve deficit.   Skin: Skin is warm, dry and intact.   Psychiatric: She has a normal mood and affect. Her speech is normal and behavior is normal. Judgment normal. Cognition and memory are normal.             HCM  Pap's:  No recent prior to surgery normal, get's them irregulariy   Mammo:  Normal   Colonoscopy: normal   Dexa: normal     CT 2/12/2019  1. Right lower quadrant renal allograft in place with appropriately positioned ureteral stent and no evidence of hydronephrosis.  Small adjacent peritransplant fluid/fluid collection similar to most recent ultrasound examination.  Small fluid collection within the subcutaneous soft tissues of the anterior abdominal wall as detailed above.  2. Findings in keeping with medical renal disease with bilateral renal and cortical thinning, renal cysts and subcentimeter renal hypodensities too small to definitively characterize.  3. Cholecystectomy, small hiatal hernia and diverticulosis without evidence to suggest diverticulitis.  4. Additional findings as detailed above.     Assessment:        1. Recurrent UTI    2. Mixed urge and stress incontinence    3. Urinary urgency    4. Vaginal atrophy               Plan:        1. Recurrent UTI   --UA and Culture, on cipro feels symptoms will check urine   --Vaginal estrogen has been shown to decrease the recurrence of urinary tract infections in post menopausal women  --Change pads often: Q 2-3 hours and add barrier cream daily (Adri's butt paste vs dessitin)   --Add Cranberry supplementation and Probiotics  --Discussed long term treatment options including:  Antibiotic ppx vs self treatment discussed ppx abx for 3-6 month, concern for long term abx with Cipro include risk of C. Diff but given bacteria's resistance patterns to other oral antibiotics.     2. Prolapse: Stage1 apical prolapse, Stage 2 posterior vaginal wall prolapse   --Daily pelvic floor exercises as assigned, consider Pelvic floor PT in future  --Non surgical options discussed with patient      3.  Vaginal atrophy (dryness):  Use 1 gram of estrogen cream in vagina nightly x 2 weeks, then twice a week thereafter.      4. Mixed urinary incontinence, urge> > stress:  --Bladder diary for 3-7 days, write the number of times you go to the rest room and associated symptoms of urgency or leakage.   --Empty bladder every 3 hours.  Empty well: wait a minute, lean forward on toilet.    --Avoid dietary irritants (see sheet).  Keep diary x 3-5 days to determine your irritants.  --KEGELS: do 10 in AM and 10 in PM, holding each x 10 seconds.  When you feel urge to go, STOP, KEGEL, and when urge has passed, then go to bathroom.  Consider PT in future.    --URGE: Consider Ditropan 10 mg daily or Myrbetriq 50 mg daily.  SE profile reviewed.  Takes 2-4 weeks to see if will have effect.  For dry mouth: get sour, sugar free lozenge or gum.    --STRESS:  Pelvic floor excesses     5. Morbid Obesity: discussed healthy eating habits, consider nutritional consult. Obesity has been shown to be an independent risk factor for urinary incontinence. Weight loss has been shown to decrease incidence of urinary incontinence significantly.      Approximately 60 min were spent in consult, 90 % in discussion.     Thank you for requesting consultation of your patient.  I look forward to participating in her care.    Jacqui Artis DO  Female Pelvic Medicine and Reconstructive Surgery  [facility] [place], [place]

## 2019-03-08 NOTE — PATIENT INSTRUCTIONS
1. Recurrent UTI   --UA and Culture, on cipro   --Vaginal estrogen has been shown to decrease the recurrence of urinary tract infections in post menopausal women  --Change pads often: Q 2-3 hours and add barrier cream daily (Adri's butt paste vs dessitin)   --Add Cranberry supplementation and Probiotics  --Discussed long term treatment options including: Antibiotic ppx vs self treatment discussed ppx bactrim for 3-6 month,     2. Prolapse: Stage1 apical prolapse, Stage 2 posterior vaginal wall prolapse   --Daily pelvic floor exercises as assigned, consider Pelvic floor PT in future  --Non surgical options discussed with patient      3.  Vaginal atrophy (dryness):  Use 1 gram of estrogen cream in vagina nightly x 2 weeks, then twice a week thereafter.      4. Mixed urinary incontinence, urge> > stress:  --Bladder diary for 3-7 days, write the number of times you go to the rest room and associated symptoms of urgency or leakage.   --Empty bladder every 3 hours.  Empty well: wait a minute, lean forward on toilet.    --Avoid dietary irritants (see sheet).  Keep diary x 3-5 days to determine your irritants.  --KEGELS: do 10 in AM and 10 in PM, holding each x 10 seconds.  When you feel urge to go, STOP, KEGEL, and when urge has passed, then go to bathroom.  Consider PT in future.    --URGE: Consider Ditropan 10 mg daily or Myrbetriq 50 mg daily.  SE profile reviewed.  Takes 2-4 weeks to see if will have effect.  For dry mouth: get sour, sugar free lozenge or gum.    --STRESS:  Pelvic floor excesses

## 2019-03-09 LAB — BACTERIA UR CULT: NO GROWTH

## 2019-03-11 ENCOUNTER — OFFICE VISIT (OUTPATIENT)
Dept: TRANSPLANT | Facility: CLINIC | Age: 59
End: 2019-03-11
Payer: MEDICARE

## 2019-03-11 ENCOUNTER — TELEPHONE (OUTPATIENT)
Dept: SPORTS MEDICINE | Facility: CLINIC | Age: 59
End: 2019-03-11

## 2019-03-11 ENCOUNTER — PATIENT MESSAGE (OUTPATIENT)
Dept: UROGYNECOLOGY | Facility: CLINIC | Age: 59
End: 2019-03-11

## 2019-03-11 ENCOUNTER — LAB VISIT (OUTPATIENT)
Dept: LAB | Facility: HOSPITAL | Age: 59
End: 2019-03-11
Attending: INTERNAL MEDICINE
Payer: MEDICARE

## 2019-03-11 VITALS
SYSTOLIC BLOOD PRESSURE: 119 MMHG | BODY MASS INDEX: 33.15 KG/M2 | RESPIRATION RATE: 18 BRPM | HEIGHT: 67 IN | OXYGEN SATURATION: 96 % | HEART RATE: 98 BPM | WEIGHT: 211.19 LBS | DIASTOLIC BLOOD PRESSURE: 64 MMHG | TEMPERATURE: 98 F

## 2019-03-11 DIAGNOSIS — Z94.0 IMMUNOSUPPRESSIVE MANAGEMENT ENCOUNTER FOLLOWING KIDNEY TRANSPLANT: ICD-10-CM

## 2019-03-11 DIAGNOSIS — N18.30 CHRONIC KIDNEY DISEASE (CKD), STAGE III (MODERATE): Primary | Chronic | ICD-10-CM

## 2019-03-11 DIAGNOSIS — Z94.0 KIDNEY REPLACED BY TRANSPLANT: ICD-10-CM

## 2019-03-11 DIAGNOSIS — Z79.899 IMMUNOSUPPRESSIVE MANAGEMENT ENCOUNTER FOLLOWING KIDNEY TRANSPLANT: ICD-10-CM

## 2019-03-11 DIAGNOSIS — Z91.89 AT RISK FOR OPPORTUNISTIC INFECTIONS: ICD-10-CM

## 2019-03-11 DIAGNOSIS — E83.42 HYPOMAGNESEMIA: ICD-10-CM

## 2019-03-11 DIAGNOSIS — E83.39 HYPOPHOSPHATEMIA: ICD-10-CM

## 2019-03-11 DIAGNOSIS — I15.1 HYPERTENSION SECONDARY TO OTHER RENAL DISORDERS: ICD-10-CM

## 2019-03-11 DIAGNOSIS — N39.0 RECURRENT UTI: Primary | ICD-10-CM

## 2019-03-11 DIAGNOSIS — Z29.89 PROPHYLACTIC IMMUNOTHERAPY: ICD-10-CM

## 2019-03-11 DIAGNOSIS — E03.9 ACQUIRED HYPOTHYROIDISM: ICD-10-CM

## 2019-03-11 DIAGNOSIS — Z94.0 STATUS POST LIVING-DONOR KIDNEY TRANSPLANTATION: ICD-10-CM

## 2019-03-11 LAB
ALBUMIN SERPL BCP-MCNC: 4 G/DL
ALBUMIN SERPL BCP-MCNC: 4 G/DL
ALP SERPL-CCNC: 213 U/L
ALT SERPL W/O P-5'-P-CCNC: 24 U/L
ANION GAP SERPL CALC-SCNC: 10 MMOL/L
AST SERPL-CCNC: 20 U/L
BASOPHILS # BLD AUTO: 0.04 K/UL
BASOPHILS NFR BLD: 0.9 %
BILIRUB DIRECT SERPL-MCNC: 0.3 MG/DL
BILIRUB SERPL-MCNC: 0.7 MG/DL
BUN SERPL-MCNC: 15 MG/DL
CALCIUM SERPL-MCNC: 9.8 MG/DL
CHLORIDE SERPL-SCNC: 102 MMOL/L
CO2 SERPL-SCNC: 25 MMOL/L
CREAT SERPL-MCNC: 1.2 MG/DL
DIFFERENTIAL METHOD: ABNORMAL
EOSINOPHIL # BLD AUTO: 0.2 K/UL
EOSINOPHIL NFR BLD: 3.4 %
ERYTHROCYTE [DISTWIDTH] IN BLOOD BY AUTOMATED COUNT: 18 %
EST. GFR  (AFRICAN AMERICAN): 56.4 ML/MIN/1.73 M^2
EST. GFR  (NON AFRICAN AMERICAN): 48.9 ML/MIN/1.73 M^2
GLUCOSE SERPL-MCNC: 227 MG/DL
HCT VFR BLD AUTO: 34 %
HGB BLD-MCNC: 10.8 G/DL
IMM GRANULOCYTES # BLD AUTO: 0.01 K/UL
IMM GRANULOCYTES NFR BLD AUTO: 0.2 %
LYMPHOCYTES # BLD AUTO: 0.2 K/UL
LYMPHOCYTES NFR BLD: 4.7 %
MAGNESIUM SERPL-MCNC: 1.4 MG/DL
MCH RBC QN AUTO: 30.6 PG
MCHC RBC AUTO-ENTMCNC: 31.8 G/DL
MCV RBC AUTO: 96 FL
MONOCYTES # BLD AUTO: 0.4 K/UL
MONOCYTES NFR BLD: 9 %
NEUTROPHILS # BLD AUTO: 3.6 K/UL
NEUTROPHILS NFR BLD: 81.8 %
NRBC BLD-RTO: 0 /100 WBC
PHOSPHATE SERPL-MCNC: 3.4 MG/DL
PLATELET # BLD AUTO: 219 K/UL
PMV BLD AUTO: 10.2 FL
POTASSIUM SERPL-SCNC: 4.4 MMOL/L
PROT SERPL-MCNC: 7.5 G/DL
RBC # BLD AUTO: 3.53 M/UL
SODIUM SERPL-SCNC: 137 MMOL/L
T4 FREE SERPL-MCNC: 0.98 NG/DL
TSH SERPL DL<=0.005 MIU/L-ACNC: 0.43 UIU/ML
WBC # BLD AUTO: 4.44 K/UL

## 2019-03-11 PROCEDURE — 84439 ASSAY OF FREE THYROXINE: CPT

## 2019-03-11 PROCEDURE — 99215 OFFICE O/P EST HI 40 MIN: CPT | Mod: S$PBB,,, | Performed by: INTERNAL MEDICINE

## 2019-03-11 PROCEDURE — 84075 ASSAY ALKALINE PHOSPHATASE: CPT

## 2019-03-11 PROCEDURE — 99215 PR OFFICE/OUTPT VISIT, EST, LEVL V, 40-54 MIN: ICD-10-PCS | Mod: S$PBB,,, | Performed by: INTERNAL MEDICINE

## 2019-03-11 PROCEDURE — 36415 COLL VENOUS BLD VENIPUNCTURE: CPT | Mod: PO

## 2019-03-11 PROCEDURE — 99213 OFFICE O/P EST LOW 20 MIN: CPT | Mod: PBBFAC | Performed by: INTERNAL MEDICINE

## 2019-03-11 PROCEDURE — 99999 PR PBB SHADOW E&M-EST. PATIENT-LVL III: ICD-10-PCS | Mod: PBBFAC,,, | Performed by: INTERNAL MEDICINE

## 2019-03-11 PROCEDURE — 82247 BILIRUBIN TOTAL: CPT

## 2019-03-11 PROCEDURE — 80197 ASSAY OF TACROLIMUS: CPT

## 2019-03-11 PROCEDURE — 99999 PR PBB SHADOW E&M-EST. PATIENT-LVL III: CPT | Mod: PBBFAC,,, | Performed by: INTERNAL MEDICINE

## 2019-03-11 PROCEDURE — 83735 ASSAY OF MAGNESIUM: CPT

## 2019-03-11 PROCEDURE — 84443 ASSAY THYROID STIM HORMONE: CPT

## 2019-03-11 PROCEDURE — 80069 RENAL FUNCTION PANEL: CPT

## 2019-03-11 PROCEDURE — 85025 COMPLETE CBC W/AUTO DIFF WBC: CPT

## 2019-03-11 PROCEDURE — 87799 DETECT AGENT NOS DNA QUANT: CPT

## 2019-03-11 NOTE — LETTER
March 12, 2019        Dalton Heaton  [address] [facility] [address]  [place] [address]  Phone: [phone]  Fax: [phone]             [facility]  [address]  [facility] [address]  Phone: [phone]   Patient: Andra Newell   MR Number: [id number]   YOB: 1960   Date of Visit: 3/11/2019       Dear Dr. Dalton Heaton    Thank you for referring Andra Newell to me for evaluation. Attached you will find relevant portions of my assessment and plan of care.    If you have questions, please do not hesitate to call me. I look forward to following Andra Newell along with you.    Sincerely,    Maureen Cabral MD    Enclosure    If you would like to receive this communication electronically, please contact [email or web address] or [phone] to request [employer] access.    [employer] is a tool which provides read-only access to select patient information with whom you have a relationship. Its easy to use and provides real time access to review your patients record including encounter summaries, notes, results, and demographic information.    If you feel you have received this communication in error or would no longer like to receive these types of communications, please e-mail [email or web address]

## 2019-03-11 NOTE — TELEPHONE ENCOUNTER
Received LTD forms from [employer]. Forms completed and faxed back to them at [phone].    Received a request from  [employer] [facility] for patients medical records. The request was sent to our medical record department.    [facility]

## 2019-03-12 ENCOUNTER — PATIENT MESSAGE (OUTPATIENT)
Dept: TRANSPLANT | Facility: CLINIC | Age: 59
End: 2019-03-12

## 2019-03-12 LAB — TACROLIMUS BLD-MCNC: 8 NG/ML

## 2019-03-12 NOTE — PROGRESS NOTES
Kidney Post-Transplant Assessment    Referring Physician: Dalton Haeton  Current Nephrologist: Dalton Heaton    ORGAN: LEFT KIDNEY  Donor Type: living  PHS Increased Risk: no  Cold Ischemia: 37 mins  Induction Medications: campath - alemtuzumab (anti-cd52)    Subjective:     CC:  Reassessment of renal allograft function and management of chronic immunosuppression.    HPI:  Ms. Newell is a 59 y.o. year old White female who received a living kidney transplant on 1/14/19. Her most recent creatinine is 1.1. She takes mycophenolate mofetil (held d/t recurrent ESBL UTIs Jan-Feb 2019) and tacrolimus for maintenance immunosuppression. Her post transplant course has been uncomplicated to date.    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 She briefly required HD 4/13/18 until ~Sept 2018.  she was predialysis at the time of transplant.   -LURD KT (friend) 01/14/2019,  Induced with Campath.     -Recurrent K pneumoniae ESBL 2019 UTI 1/30, 2/11, and  2/25  -Admit for unsteadiness and dizziness with work up WNL, MRI pending (r/s d/t hosp admit)    PCP PROPHYLAXIS: Until 1/14/19; Atovaquone; Bactrim stopped 01/24/2019 D/T hiK  CMV PROPHYLAXIS: CMV  Mismatch -study participant. Rx until 7/14/19  FUNGAL PROPHYLAXIS: None    Andra had another ESBL UTI since her last clinic visit, proven by cath urine. This is her 3rd after transplant. I treated her with cipro and referred her to uroGYN and txp ID. She reported no symptoms to me today, and a urine culture 3/8 was NG. She started cranberry pills, probiotics and estrogen cream. She has been off MMF d/t recurrent UTIs.  She still has fatigue, but notes it's a little better. She denies fever, chills, CP, SOB, GI issues. No LUTS.    Review of Systems   Constitutional: Positive for fatigue. Negative for fever.   Eyes: Negative for visual disturbance.   Respiratory: Negative for shortness of breath.   "  Cardiovascular: Negative for chest pain and leg swelling.   Gastrointestinal: Negative.    Genitourinary: Negative for difficulty urinating.   Musculoskeletal: Negative.    Skin: Negative for rash.   Allergic/Immunologic: Positive for immunocompromised state.   Neurological: Negative for tremors.     Objective: /64 (BP Location: Right arm, Patient Position: Sitting, BP Method: Large (Automatic))   Pulse 98   Temp 98.3 °F (36.8 °C) (Oral)   Resp 18   Ht 5' 7" (1.702 m)   Wt 95.8 kg (211 lb 3.2 oz)   LMP 02/28/2012   SpO2 96%   BMI 33.08 kg/m²       Physical Exam   Constitutional: No distress.   Cardiovascular: Normal rate and regular rhythm.   Pulmonary/Chest: Effort normal and breath sounds normal. No respiratory distress.   Abdominal: Soft. Bowel sounds are normal. She exhibits no mass. There is no tenderness.   Musculoskeletal: She exhibits no edema.   Skin: Skin is warm and dry.   Psychiatric: She has a normal mood and affect.     Labs:  Lab Results   Component Value Date    WBC 4.44 03/11/2019    HGB 10.8 (L) 03/11/2019    HCT 34.0 (L) 03/11/2019     03/11/2019    K 4.4 03/11/2019     03/11/2019    CO2 25 03/11/2019    BUN 15 03/11/2019    CREATININE 1.2 03/11/2019    EGFRNONAA 48.9 (A) 03/11/2019    CALCIUM 9.8 03/11/2019    PHOS 3.4 03/11/2019    MG 1.4 (L) 03/11/2019    ALBUMIN 4.0 03/11/2019    ALBUMIN 4.0 03/11/2019    AST 20 03/11/2019    ALT 24 03/11/2019    UTPCR 0.11 03/11/2019    .0 (H) 02/15/2019    TACROLIMUS 7.3 03/04/2019     No results found for: EXTANC, EXTWBC, EXTSEGS, EXTPLATELETS, EXTHEMOGLOBI, EXTHEMATOCRI, EXTCREATININ, EXTSODIUM, EXTPOTASSIUM, EXTBUN, EXTCO2, EXTCALCIUM, EXTPHOSPHORU, EXTGLUCOSE, EXTALBUMIN, EXTAST, EXTALT, EXTBILITOTAL, EXTLIPASE, EXTAMYLASE    No results found for: EXTCYCLOSLVL, EXTSIROLIMUS, EXTTACROLVL, EXTPROTCRE, EXTPTHINTACT, EXTPROTEINUA, EXTWBCUA, EXTRBCUA    Labs were reviewed with the patient    Assessment:     1. CKD stage III " (moderate)    2. Status post living-donor kidney transplantation    3. Hypophosphatemia    4. Hypomagnesemia    5. At risk for opportunistic infections    6. Prophylactic immunotherapy    7. Immunosuppressive management encounter following kidney transplant    8. Hypertension secondary to other renal disorders        Plan:   -CKD3a s/p kidney transplantation, doing well. Continue to monitor renal function and electrolytes, HTN, secondary hyperparathyroidism and other issues related to underlying ESRD.      -Continue tacrolimus-based immunosuppression. Goal 7-10, presently at target.  Will trend immunosuppression levels. Must watch for drug toxicity and med-related side effects.      -Hyperkalemia by history- continue low-potassium diet and p.r.n. Kayexalate.  No need for chronic medication therapy at present.    -Hypophosphatemia - resolved w/o meds supplement.  Continue to monitor.    -Hypomagnesemia- will continue magnesium oxide 800 mg daily;  Will tolerate mild asymptomatic low level d/t pill burden, DDI, and adverse SE     -OI prophylaxis:     -Bactrim d/c'd d/t hyperkalemia, on atovaquone.     -Valcyte study medication for CMV prophylaxis held last admit; OK to resume.    -Fatigue & weakness:  See my prior notes. No new plans.    -hypertension - BP ok watch to ensure does not get too low    -h/o mood disorder:  Cont pre-txp meds    -anemia of ? CKD versus chronic disease versus drug effect.  Also has known iron deficiency.  Weekly Procrit ordered to keep hgb 9-10 as per guidelines.    Follow-up:   Clinic: return to transplant clinic weekly for the first month after transplant; every 2 weeks during months 2-3; then at 6-, 9-, 12-, 18-, 24-, and 36- months post-transplant to reassess for complications from immunosuppression toxicity and monitor for rejection.  Annually thereafter.    Labs: since patient remains at high risk for rejection and drug-related complications that warrant close monitoring, labs will be  ordered as follows: continue twice weekly CBC, renal panel, and drug level for first month; then same labs once weekly through 3rd month post-transplant.  Urine for UA and protein/creatinine ratio monthly.  Urine BK - PCR at 1-, 3-, 6-, 9-, 12-, 18-, 24-, and 36- months post-transplant.  Hepatic panel at 1-, 2-, 3-, 6-, 9-, 12-, 18-, 24-, and 36- months post-transplant.    Maureen Cabral MD

## 2019-03-13 ENCOUNTER — LAB VISIT (OUTPATIENT)
Dept: LAB | Facility: HOSPITAL | Age: 59
End: 2019-03-13
Payer: MEDICARE

## 2019-03-13 ENCOUNTER — RESEARCH ENCOUNTER (OUTPATIENT)
Dept: RESEARCH | Facility: HOSPITAL | Age: 59
End: 2019-03-13
Payer: MEDICARE

## 2019-03-13 DIAGNOSIS — Z00.6 RESEARCH SUBJECT: ICD-10-CM

## 2019-03-13 DIAGNOSIS — Z00.6 RESEARCH STUDY PATIENT: ICD-10-CM

## 2019-03-13 DIAGNOSIS — R19.7 DIARRHEA, UNSPECIFIED TYPE: ICD-10-CM

## 2019-03-13 DIAGNOSIS — Z94.0 KIDNEY TRANSPLANT RECIPIENT: Primary | ICD-10-CM

## 2019-03-13 DIAGNOSIS — Z94.0 STATUS POST LIVING-DONOR KIDNEY TRANSPLANTATION: ICD-10-CM

## 2019-03-13 LAB
BKV DNA SERPL NAA+PROBE-ACNC: <125 COPIES/ML
BKV DNA SERPL NAA+PROBE-LOG#: <2.1 LOG (10) COPIES/ML
BKV DNA SERPL QL NAA+PROBE: NOT DETECTED
DRUG STUDY SPECIMEN TYPE: NORMAL
DRUG STUDY TEST NAME: NORMAL
DRUG STUDY TEST RESULT: NORMAL

## 2019-03-13 PROCEDURE — 36415 COLL VENOUS BLD VENIPUNCTURE: CPT

## 2019-03-13 PROCEDURE — 99000 SPECIMEN HANDLING OFFICE-LAB: CPT

## 2019-03-13 NOTE — PROGRESS NOTES
[facility] CMV Prevention (Kidney Transplant)  Protocol: [id number]  IRB# 2017.512  PI: Nicholas GUAN)  Site: [id number]  Subject #[id number]     VISIT 7 / WEEK 8:       Date of Visit: 13/MAR/2019     Patient arrived at clinic to complete his V7-Wk8 visit. Protocol Labs were drawn in morning at 09:53 AM before study medication was taken from new study medication bottles. All specimens processed and shipped per protocol. Byron SARAVIA conducted visti in its entirety with Sub-Sharri ARIAS present. Patient agrees to continue his participation in the study and all questions answered.                                                                                                                                                                                                                                                                                                                                                    Changes in con-meds (antibiotic use). Subject reports, no new AEs have occurred when asked. However patient states, she is having more loose stools and more frequent in the last day. Started around a week ago and up to 4 loose stool bowel movements. Has had prior antibiotic use. Will be accessed by Bernardino-Sharri ARIAS with stool collection test.     Vital signs obtained sitting:  BP: 135/80  HR: 102  RR: 18  Wt: 96.5 kg  T: 98.1 F oral     Follow-up in 2 week for Week 6 visit. Informed to bring back bottles, old study medication, and dairy. Routine instructions on taking study medications and restrictions provided.    CMV Disease Assessment, Health Outcomes Assessment, Renal Transplant Outcomes, Health Outcomes Assessment and Child Pierre Score completed. Pt agreed to continue to follow compliance in regards to effective birth control and donation of sperm as required by protocol.       Diary collected and reviewed with subject. Pt stated that he took medications every day and did not miss any days.  Pt stated no loss of drug. Current diary collected. New diary dispensed.      Pill bottles were brought to visit and collected. Drug returned:   MK-8228/Placebo -     8  VGCV/Placebo -         16  ACV/Placebo -            16     Old bottles returned to research pharmacy.  re-educated the patient on the importance of bringing all bottles back into next visit. Subject was assigned new study drug bottles.       New study medication (3 bottles) dispensed to subject: [id number] and [id number] and [id number].   CrCl = 75.0. Will be given:  MK-8228/Placebo - 1 pill per day  VGCV/Placebo - 2 pills per day  ACV/Placebo -  1/1pills per day    All 3 study medication taken in clinic from new bottles.     Detailed instructions to bring ALL bottles back to the site. Education and training provided on when to take study drug, and what not to eat/drink while taking study medication, etc.      Study participant reminded to call  for any questions. Reminder to bring diary and all pill bottles back to next appointment on 27/MAR/2019 at 11:00 (labs) and 11:15 (visit).

## 2019-03-14 ENCOUNTER — TELEPHONE (OUTPATIENT)
Dept: SPORTS MEDICINE | Facility: CLINIC | Age: 59
End: 2019-03-14

## 2019-03-14 DIAGNOSIS — E11.65 UNCONTROLLED TYPE 2 DIABETES MELLITUS WITH HYPERGLYCEMIA: Primary | ICD-10-CM

## 2019-03-14 RX ORDER — PEN NEEDLE, DIABETIC 30 GX3/16"
NEEDLE, DISPOSABLE MISCELLANEOUS
Qty: 400 EACH | Refills: 5 | Status: SHIPPED | OUTPATIENT
Start: 2019-03-14 | End: 2019-05-06 | Stop reason: CLARIF

## 2019-03-14 NOTE — TELEPHONE ENCOUNTER
Received a request from  [facility] for patients medical records. The request was sent to our medical record department.    Maria Snider MA  [facility]

## 2019-03-18 ENCOUNTER — LAB VISIT (OUTPATIENT)
Dept: LAB | Facility: HOSPITAL | Age: 59
End: 2019-03-18
Attending: INTERNAL MEDICINE
Payer: MEDICARE

## 2019-03-18 DIAGNOSIS — Z94.0 KIDNEY REPLACED BY TRANSPLANT: ICD-10-CM

## 2019-03-18 LAB
ALBUMIN SERPL BCP-MCNC: 3.9 G/DL
ANION GAP SERPL CALC-SCNC: 8 MMOL/L
BASOPHILS # BLD AUTO: 0.02 K/UL
BASOPHILS NFR BLD: 0.9 %
BUN SERPL-MCNC: 16 MG/DL
CALCIUM SERPL-MCNC: 9.4 MG/DL
CHLORIDE SERPL-SCNC: 104 MMOL/L
CO2 SERPL-SCNC: 27 MMOL/L
CREAT SERPL-MCNC: 1 MG/DL
DIFFERENTIAL METHOD: ABNORMAL
EOSINOPHIL # BLD AUTO: 0.1 K/UL
EOSINOPHIL NFR BLD: 3.4 %
ERYTHROCYTE [DISTWIDTH] IN BLOOD BY AUTOMATED COUNT: 16.5 %
EST. GFR  (AFRICAN AMERICAN): >60 ML/MIN/1.73 M^2
EST. GFR  (NON AFRICAN AMERICAN): >60 ML/MIN/1.73 M^2
GLUCOSE SERPL-MCNC: 183 MG/DL
HCT VFR BLD AUTO: 30.6 %
HGB BLD-MCNC: 9.8 G/DL
IMM GRANULOCYTES # BLD AUTO: 0.01 K/UL
IMM GRANULOCYTES NFR BLD AUTO: 0.4 %
LYMPHOCYTES # BLD AUTO: 0.3 K/UL
LYMPHOCYTES NFR BLD: 10.6 %
MAGNESIUM SERPL-MCNC: 1.3 MG/DL
MCH RBC QN AUTO: 30.5 PG
MCHC RBC AUTO-ENTMCNC: 32 G/DL
MCV RBC AUTO: 95 FL
MONOCYTES # BLD AUTO: 0.5 K/UL
MONOCYTES NFR BLD: 19.6 %
NEUTROPHILS # BLD AUTO: 1.5 K/UL
NEUTROPHILS NFR BLD: 65.1 %
NRBC BLD-RTO: 0 /100 WBC
PHOSPHATE SERPL-MCNC: 3.6 MG/DL
PLATELET # BLD AUTO: 189 K/UL
PMV BLD AUTO: 10.5 FL
POTASSIUM SERPL-SCNC: 4.7 MMOL/L
RBC # BLD AUTO: 3.21 M/UL
SODIUM SERPL-SCNC: 139 MMOL/L
WBC # BLD AUTO: 2.35 K/UL

## 2019-03-18 PROCEDURE — 85025 COMPLETE CBC W/AUTO DIFF WBC: CPT

## 2019-03-18 PROCEDURE — 36415 COLL VENOUS BLD VENIPUNCTURE: CPT | Mod: PO

## 2019-03-18 PROCEDURE — 80197 ASSAY OF TACROLIMUS: CPT

## 2019-03-18 PROCEDURE — 83735 ASSAY OF MAGNESIUM: CPT

## 2019-03-18 PROCEDURE — 80069 RENAL FUNCTION PANEL: CPT

## 2019-03-19 ENCOUNTER — TELEPHONE (OUTPATIENT)
Dept: TRANSPLANT | Facility: CLINIC | Age: 59
End: 2019-03-19

## 2019-03-19 ENCOUNTER — LAB VISIT (OUTPATIENT)
Dept: LAB | Facility: HOSPITAL | Age: 59
End: 2019-03-19
Attending: INTERNAL MEDICINE
Payer: MEDICARE

## 2019-03-19 DIAGNOSIS — Z94.0 KIDNEY REPLACED BY TRANSPLANT: ICD-10-CM

## 2019-03-19 LAB
BASOPHILS # BLD AUTO: 0.02 K/UL
BASOPHILS NFR BLD: 0.6 %
DIFFERENTIAL METHOD: ABNORMAL
EOSINOPHIL # BLD AUTO: 0.1 K/UL
EOSINOPHIL NFR BLD: 2.7 %
ERYTHROCYTE [DISTWIDTH] IN BLOOD BY AUTOMATED COUNT: 17 %
HCT VFR BLD AUTO: 30.8 %
HGB BLD-MCNC: 9.6 G/DL
IMM GRANULOCYTES # BLD AUTO: 0.01 K/UL
IMM GRANULOCYTES NFR BLD AUTO: 0.3 %
LYMPHOCYTES # BLD AUTO: 0.5 K/UL
LYMPHOCYTES NFR BLD: 14.9 %
MCH RBC QN AUTO: 30.7 PG
MCHC RBC AUTO-ENTMCNC: 31.2 G/DL
MCV RBC AUTO: 98 FL
MONOCYTES # BLD AUTO: 0.3 K/UL
MONOCYTES NFR BLD: 10.4 %
NEUTROPHILS # BLD AUTO: 2.3 K/UL
NEUTROPHILS NFR BLD: 71.1 %
NRBC BLD-RTO: 0 /100 WBC
PLATELET # BLD AUTO: 192 K/UL
PMV BLD AUTO: 10.6 FL
RBC # BLD AUTO: 3.13 M/UL
TACROLIMUS BLD-MCNC: 6.3 NG/ML
WBC # BLD AUTO: 3.28 K/UL

## 2019-03-19 PROCEDURE — 85025 COMPLETE CBC W/AUTO DIFF WBC: CPT

## 2019-03-19 PROCEDURE — 36415 COLL VENOUS BLD VENIPUNCTURE: CPT | Mod: PO

## 2019-03-19 NOTE — TELEPHONE ENCOUNTER
----- Message from Maureen Cabral MD sent at 3/18/2019  5:23 PM CDT -----  The following message sent to patient via [facility]: White count has dropped some more. Will alert research team and get their recs. Also will check CMV test.

## 2019-03-22 ENCOUNTER — PATIENT MESSAGE (OUTPATIENT)
Dept: TRANSPLANT | Facility: CLINIC | Age: 59
End: 2019-03-22

## 2019-03-22 LAB — CMV DNA SERPL NAA+PROBE-ACNC: NORMAL IU/ML

## 2019-03-25 ENCOUNTER — NURSE TRIAGE (OUTPATIENT)
Dept: ADMINISTRATIVE | Facility: CLINIC | Age: 59
End: 2019-03-25

## 2019-03-25 ENCOUNTER — TELEPHONE (OUTPATIENT)
Dept: TRANSPLANT | Facility: CLINIC | Age: 59
End: 2019-03-25

## 2019-03-25 ENCOUNTER — HOSPITAL ENCOUNTER (EMERGENCY)
Facility: HOSPITAL | Age: 59
Discharge: LEFT BEFORE DISCHARGE | End: 2019-03-25
Payer: MEDICARE

## 2019-03-25 VITALS
SYSTOLIC BLOOD PRESSURE: 122 MMHG | RESPIRATION RATE: 18 BRPM | OXYGEN SATURATION: 97 % | HEIGHT: 67 IN | TEMPERATURE: 98 F | HEART RATE: 91 BPM | BODY MASS INDEX: 33.9 KG/M2 | DIASTOLIC BLOOD PRESSURE: 62 MMHG | WEIGHT: 216 LBS

## 2019-03-25 DIAGNOSIS — R68.89 MULTIPLE COMPLAINTS: ICD-10-CM

## 2019-03-25 PROCEDURE — 99900 PR LEFT WITHOUTBEING SEEN-EMERGENCY: CPT | Mod: ,,, | Performed by: EMERGENCY MEDICINE

## 2019-03-25 PROCEDURE — 99900041 HC LEFT WITHOUT BEING SEEN- EMERGENCY

## 2019-03-25 PROCEDURE — 93010 ELECTROCARDIOGRAM REPORT: CPT | Mod: ,,, | Performed by: INTERNAL MEDICINE

## 2019-03-25 PROCEDURE — 93010 EKG 12-LEAD: ICD-10-PCS | Mod: ,,, | Performed by: INTERNAL MEDICINE

## 2019-03-25 PROCEDURE — 99900 PR LEFT WITHOUTBEING SEEN-EMERGENCY: ICD-10-PCS | Mod: ,,, | Performed by: EMERGENCY MEDICINE

## 2019-03-25 PROCEDURE — 93005 ELECTROCARDIOGRAM TRACING: CPT

## 2019-03-25 NOTE — PROVIDER PROGRESS NOTES - EMERGENCY DEPT.
Encounter Date: 3/25/2019    ED Physician Progress Notes         EKG - STEMI Decision  Initial Reading: No STEMI present.

## 2019-03-25 NOTE — TELEPHONE ENCOUNTER
Pt feeling exhausted since Friday and just off. Kind of does not want to go to ED. Labs pending today if no better tonight may come to ED.

## 2019-03-26 ENCOUNTER — TELEPHONE (OUTPATIENT)
Dept: TRANSPLANT | Facility: CLINIC | Age: 59
End: 2019-03-26

## 2019-03-26 ENCOUNTER — PATIENT MESSAGE (OUTPATIENT)
Dept: ENDOCRINOLOGY | Facility: CLINIC | Age: 59
End: 2019-03-26

## 2019-03-26 ENCOUNTER — HOSPITAL ENCOUNTER (OUTPATIENT)
Dept: RADIOLOGY | Facility: CLINIC | Age: 59
Discharge: HOME | End: 2019-03-26
Attending: INTERNAL MEDICINE
Payer: MEDICARE

## 2019-03-26 DIAGNOSIS — Z94.0 STATUS POST DECEASED-DONOR KIDNEY TRANSPLANTATION: ICD-10-CM

## 2019-03-26 DIAGNOSIS — R53.1 WEAKNESS: ICD-10-CM

## 2019-03-26 DIAGNOSIS — R53.83 FATIGUE, UNSPECIFIED TYPE: Primary | ICD-10-CM

## 2019-03-26 DIAGNOSIS — R53.83 FATIGUE, UNSPECIFIED TYPE: ICD-10-CM

## 2019-03-26 DIAGNOSIS — R06.00 DYSPNEA, UNSPECIFIED TYPE: ICD-10-CM

## 2019-03-26 PROCEDURE — 71046 XR CHEST PA AND LATERAL: ICD-10-PCS | Mod: 26,,, | Performed by: RADIOLOGY

## 2019-03-26 PROCEDURE — 71046 X-RAY EXAM CHEST 2 VIEWS: CPT | Mod: 26,,, | Performed by: RADIOLOGY

## 2019-03-26 PROCEDURE — 71046 X-RAY EXAM CHEST 2 VIEWS: CPT | Mod: TC,FY,PO

## 2019-03-26 NOTE — ED NOTES
Pt is upset that she has been waiting so long in the lobby. She states she is waiting another 30 mins then leaving. She is the next person to go to an ED bed. I encouraged patient to stay due to her recent kidney transplant.

## 2019-03-26 NOTE — ED NOTES
Deion, Charge nurse spoke with patient a few minutes ago about getting patient to ED bed soon. Room is being cleaned currently. Pt is more upset and states she cannot wait any longer because she has been here for 3 hours. Pt left ER.

## 2019-03-26 NOTE — TELEPHONE ENCOUNTER
Dr Mercado spoke with pt's wife this am- pt is c/o weakness and fatigue, possible dehydration.  Placed orders and asked Pharm to place therapy plan for IVF. Contacted [facility]fusion in [place] to request scheduling.

## 2019-03-26 NOTE — TELEPHONE ENCOUNTER
Reason for Disposition   [1] Follow-up call to recent contact AND [2] information only call, no triage required    Protocols used: INFORMATION ONLY CALL-A-AH    Pt's wife expresses frustration that pt has been waiting in ER lobby for over two hours. Wife advised not to leave ED. ER charge nurse called and notified that wife is frustrated over ER wait. Charge nurse states pt should be next and he will get pt to a room as soon as possible.         Kidney Txp #1 (2m)  No BPA used      Wife called back and wife states pt has left the ED and she will be making a formal complaint. Wife given verbal support and notified to call back if she needs anything else. Wife verbalizes understanding.

## 2019-03-27 ENCOUNTER — LAB VISIT (OUTPATIENT)
Dept: LAB | Facility: HOSPITAL | Age: 59
End: 2019-03-27
Payer: MEDICARE

## 2019-03-27 ENCOUNTER — RESEARCH ENCOUNTER (OUTPATIENT)
Dept: RESEARCH | Facility: HOSPITAL | Age: 59
End: 2019-03-27
Payer: MEDICAID

## 2019-03-27 ENCOUNTER — TELEPHONE (OUTPATIENT)
Dept: TRANSPLANT | Facility: CLINIC | Age: 59
End: 2019-03-27

## 2019-03-27 ENCOUNTER — RESEARCH ENCOUNTER (OUTPATIENT)
Dept: RESEARCH | Facility: CLINIC | Age: 59
End: 2019-03-27
Payer: MEDICARE

## 2019-03-27 ENCOUNTER — PATIENT MESSAGE (OUTPATIENT)
Dept: TRANSPLANT | Facility: CLINIC | Age: 59
End: 2019-03-27

## 2019-03-27 DIAGNOSIS — Z00.6 RESEARCH SUBJECT: ICD-10-CM

## 2019-03-27 DIAGNOSIS — Z94.0 KIDNEY TRANSPLANT RECIPIENT: Primary | ICD-10-CM

## 2019-03-27 DIAGNOSIS — Z94.0 STATUS POST LIVING-DONOR KIDNEY TRANSPLANTATION: Primary | ICD-10-CM

## 2019-03-27 DIAGNOSIS — D64.9 ACUTE ON CHRONIC ANEMIA: ICD-10-CM

## 2019-03-27 DIAGNOSIS — Z00.6 RESEARCH STUDY PATIENT: ICD-10-CM

## 2019-03-27 DIAGNOSIS — Z94.0 STATUS POST LIVING-DONOR KIDNEY TRANSPLANTATION: ICD-10-CM

## 2019-03-27 LAB
DRUG STUDY SPECIMEN TYPE: NORMAL
DRUG STUDY TEST NAME: NORMAL
DRUG STUDY TEST RESULT: NORMAL

## 2019-03-27 PROCEDURE — 99499 UNLISTED E&M SERVICE: CPT | Mod: S$PBB,,, | Performed by: CLINICAL NURSE SPECIALIST

## 2019-03-27 PROCEDURE — 99000 SPECIMEN HANDLING OFFICE-LAB: CPT

## 2019-03-27 PROCEDURE — 36415 COLL VENOUS BLD VENIPUNCTURE: CPT

## 2019-03-27 PROCEDURE — 99499 NO LOS: ICD-10-PCS | Mod: S$PBB,,, | Performed by: CLINICAL NURSE SPECIALIST

## 2019-03-27 NOTE — TELEPHONE ENCOUNTER
----- Message from Eugenie Hill sent at 3/27/2019  2:52 PM CDT -----  Contact: Patient       ----- Message -----  From: Maureen Valle  Sent: 3/27/2019   2:23 PM  To: [facility]    Needs Advice    Reason for call: To discuss a few things with coordinator         Communication Preference: [phone]    Additional Information: n/a

## 2019-03-27 NOTE — PROGRESS NOTES
Protocol: [id number]  IRB# 2017.512  PI: Nicholas GUAN)  Site: [id number]  Subject #[id number]     VISIT 8 / WEEK 10:       Date of Visit: 27/MAR/2019     Patient arrived at clinic to complete his V8-Wk10 visit. Protocol Labs were drawn in morning at 11:00 AM before study medication was taken from new study medication bottles. All specimens processed and shipped per protocol. Byron SARAVIA conducted visti in its entirety with Sub-I, Sharri Sands present. Patient agrees to continue his participation in the study and all questions answered.                                                                                                                                                                                                                                                                                                                                                    Changes in con-meds (antibiotic use). Subject reports, no new AEs have occurred when asked. However patient states, loose bowel movemets still continuing with around 5 bowel movements per day. Also complains she is still fatigued from her hospitalizations. Sharri Sands Sub-I performed a physical exam and assessed issues.     Vital signs obtained sitting:  BP: 115/78  HR: 103  RR: 18  Wt: 95.8 kg  T: 98.2 F oral     CMV Disease Assessment, Health Outcomes Assessment, Renal Transplant Outcomes, Health Outcomes Assessment and Child Pierre Score completed. Subject agreed to continue to follow compliance in regards to effective birth control as required by protocol.       Diary collected and reviewed with subject. Pt stated that he took medications every day and did not miss any days. Pt stated no loss of drug. Current diary collected. New diary dispensed.      Pill bottles were brought to visit and collected. Drug returned:   MK-8228/Placebo -     3  VGCV/Placebo -         5  ACV/Placebo -            7     Old bottles returned to research  pharmacy.  re-educated the patient on the importance of bringing all bottles back into next visit. Subject was assigned new study drug bottles.       New study medication (3 bottles) dispensed to subject: [id number] and [id number] and [id number].   CrCl = 74.46. No dose change. Will be given:  MK-8228/Placebo -     1 pill per day  VGCV/Placebo -         2 pills per day  ACV/Placebo -           1M/1E pills per day     All 3 study medication taken in clinic from new bottles today 27/MAR/2019 at 12:35.     Detailed instructions to bring ALL bottles back to the site. Education and training provided on when to take study drug, and what not to eat/drink while taking study medication, etc.      Study participant reminded to call  for any questions. Reminder to bring diary and all pill bottles back to next appointment on 09/APR/2019 at 10:00 (labs) and 10:30 (visit).

## 2019-03-27 NOTE — PROGRESS NOTES
Subjective:      Patient ID: Andra Newell is a 59 y.o. female.    Chief Complaint:Research      History of Present Illness  Ms. Andra Newell is a 59 y.o.female who presents to clinic today for V8-Wk10 visit. Ms. Newell has a past medical history of diabetes type 2, HTN, hyperlipidemia, arthritis, GERD, and ESRD who is s/p a living-donor kidney transplant on 1/14/19    Patient reports she continues to have diarrhea 4-5 times a day. All stool tests have been negative for infectious cause. Reports feeling fatigued, weak, with low energy. States easily feels tired when walking from car into clinic. On Monday 3/25/19 she went to the ED, but left before being seen due to long ED wait times. She has been in contact with transplant coordinator and additional labs drawn 3/26/19. Reports today that symptoms no worse, just feels she is continuing to feel weak and concerned what is cause. Denies chest pain, fever, chills, nausea, vomiting, hematuria or dysuria.       Review of Systems   Constitution: Positive for decreased appetite and malaise/fatigue. Negative for chills and fever.   HENT: Negative for congestion, hearing loss, hoarse voice and sore throat.    Eyes: Negative for blurred vision, redness and visual disturbance.   Cardiovascular: Negative for chest pain and leg swelling.   Respiratory: Negative for cough and sputum production.    Skin: Negative for dry skin, itching, rash and suspicious lesions.   Musculoskeletal: Negative for back pain, joint pain, myalgias and neck pain.   Gastrointestinal: Positive for diarrhea. Negative for abdominal pain, constipation, heartburn, nausea and vomiting.   Genitourinary: Negative for dysuria, flank pain, frequency, hematuria and urgency.   Neurological: Positive for weakness. Negative for dizziness, headaches, numbness and paresthesias.   Psychiatric/Behavioral: Negative for memory loss. The patient is not nervous/anxious.      Objective:   Physical Exam    Constitutional: She is oriented to person, place, and time. She appears well-developed and well-nourished. No distress.   HENT:   Head: Normocephalic and atraumatic.   Right Ear: External ear normal.   Left Ear: External ear normal.   Nose: Nose normal.   Eyes: Conjunctivae and EOM are normal. Right eye exhibits no discharge. Left eye exhibits no discharge.   Neck: Normal range of motion. Neck supple. No JVD present.   Cardiovascular: Normal rate and normal heart sounds.   Pulmonary/Chest: Effort normal and breath sounds normal. No respiratory distress. She has no wheezes. She has no rales.   Abdominal: Soft. Bowel sounds are normal. She exhibits no distension. There is no tenderness. There is no guarding.   Musculoskeletal: Normal range of motion. She exhibits no edema.   Neurological: She is alert and oriented to person, place, and time.   Skin: Skin is warm and dry. She is not diaphoretic.   Psychiatric: She has a normal mood and affect. Her behavior is normal. Judgment and thought content normal.   Vitals reviewed.    Assessment:       1. Status post living-donor kidney transplantation    2. Research study patient    3. Acute on chronic anemia        Labs 3/25/19 reviewed. EKG 3/25/19 reviewed. Physical assessment with no abnormal findings. Vitals at baseline. Patient scheduled to follow up with Dr. Mercado on April 3rd. Discussed with patient to notify transplant team of any worsening or new problems. Will order and continue investigational drugs at current dose/schedule. Patient instructed to call ID research team with any concerns related to research drugs.   Plan:       1. Study drug dispensed  2. RTC 4/9/19 @ 10:30 for next research visit with labs prior

## 2019-03-27 NOTE — Clinical Note
[facility] Newell today for her ID research visit. She is continuing to have diarrhea and has complaints of feeling very weak and fatigued. Her vitals and assessment were at baseline, denies fever. She has follow up with you next Wednesday. We continued study drugs, don't feel these symptoms are related. I wanted to make sure the transplant team was aware of her complaints. Thanks,Sharri

## 2019-03-28 DIAGNOSIS — N30.00 ACUTE CYSTITIS WITHOUT HEMATURIA: Primary | ICD-10-CM

## 2019-03-28 RX ORDER — CIPROFLOXACIN 500 MG/1
500 TABLET ORAL EVERY 12 HOURS
Qty: 20 TABLET | Refills: 0 | Status: ON HOLD | OUTPATIENT
Start: 2019-03-28 | End: 2019-04-05 | Stop reason: HOSPADM

## 2019-03-28 NOTE — TELEPHONE ENCOUNTER
----- Message from Maureen Cabral MD sent at 3/28/2019  4:46 PM CDT -----  It looks as though she has urine infection again. That may be why she's feeling so poorly. Resume cipro 500 mg BID x10 days. I will cc Dr. Kessler who recently saw pt, and is aware she has recurrent ESBL Kleb. Pneumoniae.

## 2019-03-29 ENCOUNTER — TELEPHONE (OUTPATIENT)
Dept: INFECTIOUS DISEASES | Facility: CLINIC | Age: 59
End: 2019-03-29

## 2019-03-29 NOTE — TELEPHONE ENCOUNTER
Patient reports having fatigue and bilateral LE weakness starting 3/25/2019  She went to ED - urine culture with Klebsiella   She was started on 10 day course of cipro 500 mg PO BID    Patient advised to update me next week on any improvement of her symptoms next week.    With future episodes, patient will contact me for labs, urine and empiric antibiotics.

## 2019-04-01 ENCOUNTER — HOSPITAL ENCOUNTER (EMERGENCY)
Facility: HOSPITAL | Age: 59
Discharge: ANOTHER HOSPITAL | End: 2019-04-01
Attending: EMERGENCY MEDICINE
Payer: MEDICARE

## 2019-04-01 ENCOUNTER — NURSE TRIAGE (OUTPATIENT)
Dept: ADMINISTRATIVE | Facility: CLINIC | Age: 59
End: 2019-04-01

## 2019-04-01 VITALS
BODY MASS INDEX: 33.57 KG/M2 | DIASTOLIC BLOOD PRESSURE: 73 MMHG | SYSTOLIC BLOOD PRESSURE: 176 MMHG | TEMPERATURE: 101 F | RESPIRATION RATE: 16 BRPM | OXYGEN SATURATION: 98 % | WEIGHT: 213.88 LBS | HEART RATE: 93 BPM | HEIGHT: 67 IN

## 2019-04-01 DIAGNOSIS — R50.9 FEBRILE ILLNESS: Primary | ICD-10-CM

## 2019-04-01 LAB
ALBUMIN SERPL BCP-MCNC: 3.8 G/DL (ref 3.5–5.2)
ALP SERPL-CCNC: 156 U/L (ref 55–135)
ALT SERPL W/O P-5'-P-CCNC: 30 U/L (ref 10–44)
ANION GAP SERPL CALC-SCNC: 10 MMOL/L (ref 8–16)
AST SERPL-CCNC: 24 U/L (ref 10–40)
BASOPHILS # BLD AUTO: 0 K/UL (ref 0–0.2)
BASOPHILS NFR BLD: 0.1 % (ref 0–1.9)
BILIRUB SERPL-MCNC: 0.4 MG/DL (ref 0.1–1)
BUN SERPL-MCNC: 14 MG/DL (ref 8–23)
CALCIUM SERPL-MCNC: 9.5 MG/DL (ref 8.7–10.5)
CHLORIDE SERPL-SCNC: 104 MMOL/L (ref 95–110)
CO2 SERPL-SCNC: 26 MMOL/L (ref 23–29)
CREAT SERPL-MCNC: 1.3 MG/DL (ref 0.5–1.4)
DIFFERENTIAL METHOD: ABNORMAL
EOSINOPHIL # BLD AUTO: 0.1 K/UL (ref 0–0.5)
EOSINOPHIL NFR BLD: 2.7 % (ref 0–8)
ERYTHROCYTE [DISTWIDTH] IN BLOOD BY AUTOMATED COUNT: 18.3 % (ref 11.5–14.5)
EST. GFR  (AFRICAN AMERICAN): 51 ML/MIN/1.73 M^2
EST. GFR  (NON AFRICAN AMERICAN): 44 ML/MIN/1.73 M^2
GLUCOSE SERPL-MCNC: 238 MG/DL (ref 70–110)
HCT VFR BLD AUTO: 25.5 % (ref 37–48.5)
HGB BLD-MCNC: 8.3 G/DL (ref 12–16)
LACTATE SERPL-SCNC: 1.8 MMOL/L (ref 0.5–2.2)
LYMPHOCYTES # BLD AUTO: 0.2 K/UL (ref 1–4.8)
LYMPHOCYTES NFR BLD: 4.1 % (ref 18–48)
MCH RBC QN AUTO: 29.6 PG (ref 27–31)
MCHC RBC AUTO-ENTMCNC: 32.7 G/DL (ref 32–36)
MCV RBC AUTO: 90 FL (ref 82–98)
MONOCYTES # BLD AUTO: 0.3 K/UL (ref 0.3–1)
MONOCYTES NFR BLD: 5.7 % (ref 4–15)
NEUTROPHILS # BLD AUTO: 4.3 K/UL (ref 1.8–7.7)
NEUTROPHILS NFR BLD: 87.4 % (ref 38–73)
PLATELET # BLD AUTO: 199 K/UL (ref 150–350)
PMV BLD AUTO: 7.7 FL (ref 9.2–12.9)
POTASSIUM SERPL-SCNC: 4.6 MMOL/L (ref 3.5–5.1)
PROT SERPL-MCNC: 6.7 G/DL (ref 6–8.4)
RBC # BLD AUTO: 2.81 M/UL (ref 4–5.4)
SODIUM SERPL-SCNC: 140 MMOL/L (ref 136–145)
WBC # BLD AUTO: 5 K/UL (ref 3.9–12.7)

## 2019-04-01 PROCEDURE — 96366 THER/PROPH/DIAG IV INF ADDON: CPT

## 2019-04-01 PROCEDURE — 99285 EMERGENCY DEPT VISIT HI MDM: CPT | Mod: 25

## 2019-04-01 PROCEDURE — 25000003 PHARM REV CODE 250: Performed by: EMERGENCY MEDICINE

## 2019-04-01 PROCEDURE — 96365 THER/PROPH/DIAG IV INF INIT: CPT

## 2019-04-01 PROCEDURE — 96367 TX/PROPH/DG ADDL SEQ IV INF: CPT

## 2019-04-01 PROCEDURE — 99285 EMERGENCY DEPT VISIT HI MDM: CPT | Mod: ,,, | Performed by: EMERGENCY MEDICINE

## 2019-04-01 PROCEDURE — 99285 PR EMERGENCY DEPT VISIT,LEVEL V: ICD-10-PCS | Mod: ,,, | Performed by: EMERGENCY MEDICINE

## 2019-04-01 PROCEDURE — 87040 BLOOD CULTURE FOR BACTERIA: CPT | Mod: 59

## 2019-04-01 PROCEDURE — 80053 COMPREHEN METABOLIC PANEL: CPT

## 2019-04-01 PROCEDURE — 85025 COMPLETE CBC W/AUTO DIFF WBC: CPT

## 2019-04-01 PROCEDURE — 36000 PLACE NEEDLE IN VEIN: CPT

## 2019-04-01 PROCEDURE — 83605 ASSAY OF LACTIC ACID: CPT

## 2019-04-01 PROCEDURE — 36415 COLL VENOUS BLD VENIPUNCTURE: CPT

## 2019-04-01 PROCEDURE — 96372 THER/PROPH/DIAG INJ SC/IM: CPT | Mod: 59

## 2019-04-01 PROCEDURE — 99285 EMERGENCY DEPT VISIT HI MDM: CPT | Mod: 25,27

## 2019-04-01 PROCEDURE — 96375 TX/PRO/DX INJ NEW DRUG ADDON: CPT

## 2019-04-01 PROCEDURE — 96361 HYDRATE IV INFUSION ADD-ON: CPT

## 2019-04-01 RX ORDER — ACETAMINOPHEN 500 MG
1000 TABLET ORAL
Status: COMPLETED | OUTPATIENT
Start: 2019-04-01 | End: 2019-04-01

## 2019-04-01 RX ORDER — ONDANSETRON 4 MG/1
4 TABLET, ORALLY DISINTEGRATING ORAL
Status: COMPLETED | OUTPATIENT
Start: 2019-04-01 | End: 2019-04-01

## 2019-04-01 RX ADMIN — ONDANSETRON 4 MG: 4 TABLET, ORALLY DISINTEGRATING ORAL at 10:04

## 2019-04-01 RX ADMIN — ACETAMINOPHEN 1000 MG: 500 TABLET ORAL at 10:04

## 2019-04-02 ENCOUNTER — HOSPITAL ENCOUNTER (INPATIENT)
Facility: HOSPITAL | Age: 59
LOS: 3 days | Discharge: HOME | DRG: 690 | End: 2019-04-05
Attending: EMERGENCY MEDICINE | Admitting: SURGERY
Payer: MEDICARE

## 2019-04-02 ENCOUNTER — TELEPHONE (OUTPATIENT)
Dept: FAMILY MEDICINE | Facility: CLINIC | Age: 59
End: 2019-04-02

## 2019-04-02 DIAGNOSIS — D64.9 ACUTE ON CHRONIC ANEMIA: ICD-10-CM

## 2019-04-02 DIAGNOSIS — N39.0 URINARY TRACT INFECTION WITHOUT HEMATURIA, SITE UNSPECIFIED: Primary | ICD-10-CM

## 2019-04-02 DIAGNOSIS — Z94.0 KIDNEY TRANSPLANT RECIPIENT: ICD-10-CM

## 2019-04-02 DIAGNOSIS — Z91.89 AT RISK FOR OPPORTUNISTIC INFECTIONS: ICD-10-CM

## 2019-04-02 DIAGNOSIS — Z94.0 KIDNEY REPLACED BY TRANSPLANT: ICD-10-CM

## 2019-04-02 DIAGNOSIS — R19.7 DIARRHEA, UNSPECIFIED TYPE: ICD-10-CM

## 2019-04-02 DIAGNOSIS — E55.9 VITAMIN D DEFICIENCY: ICD-10-CM

## 2019-04-02 DIAGNOSIS — R29.898 WEAKNESS OF BOTH LOWER EXTREMITIES: ICD-10-CM

## 2019-04-02 DIAGNOSIS — E83.42 HYPOMAGNESEMIA: ICD-10-CM

## 2019-04-02 DIAGNOSIS — Z79.60 LONG-TERM USE OF IMMUNOSUPPRESSANT MEDICATION: ICD-10-CM

## 2019-04-02 DIAGNOSIS — R50.9 FEVER: ICD-10-CM

## 2019-04-02 DIAGNOSIS — M48.062 LUMBAR STENOSIS WITH NEUROGENIC CLAUDICATION: ICD-10-CM

## 2019-04-02 LAB
ALBUMIN SERPL BCP-MCNC: 3.3 G/DL (ref 3.5–5.2)
ALP SERPL-CCNC: 139 U/L (ref 55–135)
ALT SERPL W/O P-5'-P-CCNC: 27 U/L (ref 10–44)
ANION GAP SERPL CALC-SCNC: 6 MMOL/L (ref 8–16)
ANISOCYTOSIS BLD QL SMEAR: SLIGHT
ANISOCYTOSIS BLD QL SMEAR: SLIGHT
AST SERPL-CCNC: 23 U/L (ref 10–40)
BASOPHILS NFR BLD: 0 % (ref 0–1.9)
BASOPHILS NFR BLD: 0 % (ref 0–1.9)
BILIRUB SERPL-MCNC: 0.4 MG/DL (ref 0.1–1)
BILIRUB UR QL STRIP: NEGATIVE
BUN SERPL-MCNC: 14 MG/DL (ref 8–23)
CALCIUM SERPL-MCNC: 8.8 MG/DL (ref 8.7–10.5)
CHLORIDE SERPL-SCNC: 106 MMOL/L (ref 95–110)
CK SERPL-CCNC: 26 U/L (ref 20–180)
CLARITY UR REFRACT.AUTO: CLEAR
CO2 SERPL-SCNC: 26 MMOL/L (ref 23–29)
COLOR UR AUTO: YELLOW
CREAT SERPL-MCNC: 1.2 MG/DL (ref 0.5–1.4)
DIFFERENTIAL METHOD: ABNORMAL
DIFFERENTIAL METHOD: ABNORMAL
EOSINOPHIL NFR BLD: 1 % (ref 0–8)
EOSINOPHIL NFR BLD: 2 % (ref 0–8)
ERYTHROCYTE [DISTWIDTH] IN BLOOD BY AUTOMATED COUNT: 15.5 % (ref 11.5–14.5)
ERYTHROCYTE [DISTWIDTH] IN BLOOD BY AUTOMATED COUNT: 15.7 % (ref 11.5–14.5)
EST. GFR  (AFRICAN AMERICAN): 56.4 ML/MIN/1.73 M^2
EST. GFR  (NON AFRICAN AMERICAN): 48.9 ML/MIN/1.73 M^2
GLUCOSE SERPL-MCNC: 189 MG/DL (ref 70–110)
GLUCOSE UR QL STRIP: NEGATIVE
HCT VFR BLD AUTO: 22.2 % (ref 37–48.5)
HCT VFR BLD AUTO: 40.6 % (ref 37–48.5)
HGB BLD-MCNC: 13.1 G/DL (ref 12–16)
HGB BLD-MCNC: 7.3 G/DL (ref 12–16)
HGB UR QL STRIP: NEGATIVE
HYPOCHROMIA BLD QL SMEAR: ABNORMAL
IMM GRANULOCYTES # BLD AUTO: ABNORMAL K/UL (ref 0–0.04)
IMM GRANULOCYTES # BLD AUTO: ABNORMAL K/UL (ref 0–0.04)
IMM GRANULOCYTES NFR BLD AUTO: ABNORMAL % (ref 0–0.5)
IMM GRANULOCYTES NFR BLD AUTO: ABNORMAL % (ref 0–0.5)
KETONES UR QL STRIP: NEGATIVE
LEUKOCYTE ESTERASE UR QL STRIP: NEGATIVE
LIPASE SERPL-CCNC: 52 U/L (ref 4–60)
LYMPHOCYTES NFR BLD: 12 % (ref 18–48)
LYMPHOCYTES NFR BLD: 4 % (ref 18–48)
MAGNESIUM SERPL-MCNC: 1.3 MG/DL (ref 1.6–2.6)
MAGNESIUM SERPL-MCNC: 1.4 MG/DL (ref 1.6–2.6)
MCH RBC QN AUTO: 30.3 PG (ref 27–31)
MCH RBC QN AUTO: 30.8 PG (ref 27–31)
MCHC RBC AUTO-ENTMCNC: 32.3 G/DL (ref 32–36)
MCHC RBC AUTO-ENTMCNC: 32.9 G/DL (ref 32–36)
MCV RBC AUTO: 94 FL (ref 82–98)
MCV RBC AUTO: 94 FL (ref 82–98)
MONOCYTES NFR BLD: 11 % (ref 4–15)
MONOCYTES NFR BLD: 2 % (ref 4–15)
MYELOCYTES NFR BLD MANUAL: 1 %
MYELOCYTES NFR BLD MANUAL: 5 %
NEUTROPHILS NFR BLD: 74 % (ref 38–73)
NEUTROPHILS NFR BLD: 87 % (ref 38–73)
NEUTS BAND NFR BLD MANUAL: 1 %
NITRITE UR QL STRIP: NEGATIVE
NRBC BLD-RTO: 1 /100 WBC
NRBC BLD-RTO: 2 /100 WBC
OVALOCYTES BLD QL SMEAR: ABNORMAL
OVALOCYTES BLD QL SMEAR: ABNORMAL
PH UR STRIP: 5 [PH] (ref 5–8)
PHOSPHATE SERPL-MCNC: 2.1 MG/DL (ref 2.7–4.5)
PHOSPHATE SERPL-MCNC: 3.4 MG/DL (ref 2.7–4.5)
PLATELET # BLD AUTO: 115 K/UL (ref 150–350)
PLATELET # BLD AUTO: 155 K/UL (ref 150–350)
PLATELET BLD QL SMEAR: ABNORMAL
PLATELET BLD QL SMEAR: ABNORMAL
PMV BLD AUTO: 10.3 FL (ref 9.2–12.9)
PMV BLD AUTO: 10.8 FL (ref 9.2–12.9)
POCT GLUCOSE: 189 MG/DL (ref 70–110)
POCT GLUCOSE: 212 MG/DL (ref 70–110)
POCT GLUCOSE: 220 MG/DL (ref 70–110)
POCT GLUCOSE: 258 MG/DL (ref 70–110)
POIKILOCYTOSIS BLD QL SMEAR: SLIGHT
POIKILOCYTOSIS BLD QL SMEAR: SLIGHT
POLYCHROMASIA BLD QL SMEAR: ABNORMAL
POLYCHROMASIA BLD QL SMEAR: ABNORMAL
POTASSIUM SERPL-SCNC: 4.2 MMOL/L (ref 3.5–5.1)
PROCALCITONIN SERPL IA-MCNC: 0.06 NG/ML
PROT SERPL-MCNC: 6.1 G/DL (ref 6–8.4)
PROT UR QL STRIP: NEGATIVE
RBC # BLD AUTO: 2.37 M/UL (ref 4–5.4)
RBC # BLD AUTO: 4.32 M/UL (ref 4–5.4)
SODIUM SERPL-SCNC: 138 MMOL/L (ref 136–145)
SP GR UR STRIP: 1.01 (ref 1–1.03)
TACROLIMUS BLD-MCNC: 12.1 NG/ML (ref 5–15)
URN SPEC COLLECT METH UR: NORMAL
WBC # BLD AUTO: 1.7 K/UL (ref 3.9–12.7)
WBC # BLD AUTO: 2.64 K/UL (ref 3.9–12.7)

## 2019-04-02 PROCEDURE — 83735 ASSAY OF MAGNESIUM: CPT

## 2019-04-02 PROCEDURE — 84145 PROCALCITONIN (PCT): CPT

## 2019-04-02 PROCEDURE — 25000003 PHARM REV CODE 250: Performed by: PHYSICIAN ASSISTANT

## 2019-04-02 PROCEDURE — 93010 ELECTROCARDIOGRAM REPORT: CPT | Mod: ,,, | Performed by: INTERNAL MEDICINE

## 2019-04-02 PROCEDURE — 83690 ASSAY OF LIPASE: CPT

## 2019-04-02 PROCEDURE — 99223 1ST HOSP IP/OBS HIGH 75: CPT | Mod: ,,, | Performed by: INTERNAL MEDICINE

## 2019-04-02 PROCEDURE — 12000002 HC ACUTE/MED SURGE SEMI-PRIVATE ROOM

## 2019-04-02 PROCEDURE — 87632 RESP VIRUS 6-11 TARGETS: CPT

## 2019-04-02 PROCEDURE — 82550 ASSAY OF CK (CPK): CPT

## 2019-04-02 PROCEDURE — 93005 ELECTROCARDIOGRAM TRACING: CPT

## 2019-04-02 PROCEDURE — 84100 ASSAY OF PHOSPHORUS: CPT

## 2019-04-02 PROCEDURE — 83735 ASSAY OF MAGNESIUM: CPT | Mod: 91

## 2019-04-02 PROCEDURE — 93010 EKG 12-LEAD: ICD-10-PCS | Mod: ,,, | Performed by: INTERNAL MEDICINE

## 2019-04-02 PROCEDURE — 63600175 PHARM REV CODE 636 W HCPCS: Performed by: STUDENT IN AN ORGANIZED HEALTH CARE EDUCATION/TRAINING PROGRAM

## 2019-04-02 PROCEDURE — 82962 GLUCOSE BLOOD TEST: CPT

## 2019-04-02 PROCEDURE — 81003 URINALYSIS AUTO W/O SCOPE: CPT

## 2019-04-02 PROCEDURE — A4216 STERILE WATER/SALINE, 10 ML: HCPCS | Performed by: STUDENT IN AN ORGANIZED HEALTH CARE EDUCATION/TRAINING PROGRAM

## 2019-04-02 PROCEDURE — 85007 BL SMEAR W/DIFF WBC COUNT: CPT | Mod: 91

## 2019-04-02 PROCEDURE — 80197 ASSAY OF TACROLIMUS: CPT

## 2019-04-02 PROCEDURE — S5571 INSULIN DISPOS PEN 3 ML: HCPCS | Performed by: STUDENT IN AN ORGANIZED HEALTH CARE EDUCATION/TRAINING PROGRAM

## 2019-04-02 PROCEDURE — 63600175 PHARM REV CODE 636 W HCPCS: Performed by: INTERNAL MEDICINE

## 2019-04-02 PROCEDURE — 63600175 PHARM REV CODE 636 W HCPCS: Performed by: PHYSICIAN ASSISTANT

## 2019-04-02 PROCEDURE — 80053 COMPREHEN METABOLIC PANEL: CPT

## 2019-04-02 PROCEDURE — 85027 COMPLETE CBC AUTOMATED: CPT

## 2019-04-02 PROCEDURE — 25000003 PHARM REV CODE 250: Performed by: STUDENT IN AN ORGANIZED HEALTH CARE EDUCATION/TRAINING PROGRAM

## 2019-04-02 PROCEDURE — 84100 ASSAY OF PHOSPHORUS: CPT | Mod: 91

## 2019-04-02 PROCEDURE — 99223 PR INITIAL HOSPITAL CARE,LEVL III: ICD-10-PCS | Mod: ,,, | Performed by: INTERNAL MEDICINE

## 2019-04-02 RX ORDER — ATORVASTATIN CALCIUM 20 MG/1
40 TABLET, FILM COATED ORAL DAILY
Status: DISCONTINUED | OUTPATIENT
Start: 2019-04-02 | End: 2019-04-05

## 2019-04-02 RX ORDER — ERGOCALCIFEROL 1.25 MG/1
50000 CAPSULE ORAL
Status: DISCONTINUED | OUTPATIENT
Start: 2019-04-02 | End: 2019-04-05 | Stop reason: HOSPADM

## 2019-04-02 RX ORDER — ACETAMINOPHEN 325 MG/1
650 TABLET ORAL EVERY 4 HOURS PRN
Status: DISCONTINUED | OUTPATIENT
Start: 2019-04-02 | End: 2019-04-05 | Stop reason: HOSPADM

## 2019-04-02 RX ORDER — ROSUVASTATIN CALCIUM 10 MG/1
10 TABLET, COATED ORAL DAILY
Qty: 90 TABLET | Refills: 1 | Status: SHIPPED | OUTPATIENT
Start: 2019-04-02 | End: 2019-09-30 | Stop reason: SDUPTHER

## 2019-04-02 RX ORDER — INSULIN ASPART 100 [IU]/ML
1-10 INJECTION, SOLUTION INTRAVENOUS; SUBCUTANEOUS
Status: DISCONTINUED | OUTPATIENT
Start: 2019-04-02 | End: 2019-04-05 | Stop reason: HOSPADM

## 2019-04-02 RX ORDER — MAGNESIUM SULFATE HEPTAHYDRATE 40 MG/ML
2 INJECTION, SOLUTION INTRAVENOUS ONCE
Status: COMPLETED | OUTPATIENT
Start: 2019-04-02 | End: 2019-04-02

## 2019-04-02 RX ORDER — VILAZODONE HYDROCHLORIDE 10 MG/1
40 TABLET ORAL DAILY
Status: DISCONTINUED | OUTPATIENT
Start: 2019-04-02 | End: 2019-04-05 | Stop reason: HOSPADM

## 2019-04-02 RX ORDER — SODIUM CHLORIDE 0.9 % (FLUSH) 0.9 %
3 SYRINGE (ML) INJECTION EVERY 8 HOURS
Status: DISCONTINUED | OUTPATIENT
Start: 2019-04-02 | End: 2019-04-05 | Stop reason: HOSPADM

## 2019-04-02 RX ORDER — ACETAMINOPHEN 325 MG/1
650 TABLET ORAL
Status: DISCONTINUED | OUTPATIENT
Start: 2019-04-02 | End: 2019-04-02

## 2019-04-02 RX ORDER — LEVOTHYROXINE SODIUM 75 UG/1
75 TABLET ORAL
Status: DISCONTINUED | OUTPATIENT
Start: 2019-04-02 | End: 2019-04-05 | Stop reason: HOSPADM

## 2019-04-02 RX ORDER — ATOVAQUONE 750 MG/5ML
1500 SUSPENSION ORAL DAILY
Status: DISCONTINUED | OUTPATIENT
Start: 2019-04-02 | End: 2019-04-05 | Stop reason: HOSPADM

## 2019-04-02 RX ORDER — FAMOTIDINE 20 MG/1
20 TABLET, FILM COATED ORAL 2 TIMES DAILY
Status: DISCONTINUED | OUTPATIENT
Start: 2019-04-02 | End: 2019-04-05 | Stop reason: HOSPADM

## 2019-04-02 RX ORDER — ACETAMINOPHEN 325 MG/1
650 TABLET ORAL
Status: COMPLETED | OUTPATIENT
Start: 2019-04-02 | End: 2019-04-02

## 2019-04-02 RX ORDER — ONDANSETRON 2 MG/ML
4 INJECTION INTRAMUSCULAR; INTRAVENOUS EVERY 6 HOURS PRN
Status: DISCONTINUED | OUTPATIENT
Start: 2019-04-02 | End: 2019-04-05 | Stop reason: HOSPADM

## 2019-04-02 RX ORDER — LANOLIN ALCOHOL/MO/W.PET/CERES
800 CREAM (GRAM) TOPICAL DAILY
Status: DISCONTINUED | OUTPATIENT
Start: 2019-04-02 | End: 2019-04-04

## 2019-04-02 RX ORDER — CEFEPIME HYDROCHLORIDE 2 G/1
2 INJECTION, POWDER, FOR SOLUTION INTRAVENOUS
Status: DISCONTINUED | OUTPATIENT
Start: 2019-04-02 | End: 2019-04-02

## 2019-04-02 RX ORDER — GLUCAGON 1 MG
1 KIT INJECTION
Status: DISCONTINUED | OUTPATIENT
Start: 2019-04-02 | End: 2019-04-05 | Stop reason: HOSPADM

## 2019-04-02 RX ORDER — IBUPROFEN 200 MG
24 TABLET ORAL
Status: DISCONTINUED | OUTPATIENT
Start: 2019-04-02 | End: 2019-04-05 | Stop reason: HOSPADM

## 2019-04-02 RX ORDER — LORAZEPAM 1 MG/1
1 TABLET ORAL 2 TIMES DAILY
Status: DISCONTINUED | OUTPATIENT
Start: 2019-04-02 | End: 2019-04-05 | Stop reason: HOSPADM

## 2019-04-02 RX ORDER — IBUPROFEN 200 MG
16 TABLET ORAL
Status: DISCONTINUED | OUTPATIENT
Start: 2019-04-02 | End: 2019-04-05 | Stop reason: HOSPADM

## 2019-04-02 RX ORDER — TACROLIMUS 1 MG/1
1 CAPSULE ORAL 2 TIMES DAILY
Status: DISCONTINUED | OUTPATIENT
Start: 2019-04-02 | End: 2019-04-04

## 2019-04-02 RX ADMIN — CEFEPIME 2 G: 2 INJECTION, POWDER, FOR SOLUTION INTRAVENOUS at 02:04

## 2019-04-02 RX ADMIN — ERTAPENEM 1 G: 1 INJECTION INTRAMUSCULAR; INTRAVENOUS at 07:04

## 2019-04-02 RX ADMIN — LORAZEPAM 1 MG: 1 TABLET ORAL at 09:04

## 2019-04-02 RX ADMIN — THERA TABS 1 TABLET: TAB at 09:04

## 2019-04-02 RX ADMIN — FAMOTIDINE 20 MG: 20 TABLET ORAL at 09:04

## 2019-04-02 RX ADMIN — SODIUM CHLORIDE 1000 ML: 0.9 INJECTION, SOLUTION INTRAVENOUS at 01:04

## 2019-04-02 RX ADMIN — PIPERACILLIN AND TAZOBACTAM 4.5 G: 4; .5 INJECTION, POWDER, LYOPHILIZED, FOR SOLUTION INTRAVENOUS; PARENTERAL at 05:04

## 2019-04-02 RX ADMIN — INSULIN ASPART 1 UNITS: 100 INJECTION, SOLUTION INTRAVENOUS; SUBCUTANEOUS at 10:04

## 2019-04-02 RX ADMIN — INSULIN ASPART 2 UNITS: 100 INJECTION, SOLUTION INTRAVENOUS; SUBCUTANEOUS at 05:04

## 2019-04-02 RX ADMIN — LEVOTHYROXINE SODIUM 75 MCG: 75 TABLET ORAL at 05:04

## 2019-04-02 RX ADMIN — MAGNESIUM SULFATE 2 G: 2 INJECTION INTRAVENOUS at 02:04

## 2019-04-02 RX ADMIN — INSULIN DETEMIR 20 UNITS: 100 INJECTION, SOLUTION SUBCUTANEOUS at 10:04

## 2019-04-02 RX ADMIN — ACETAMINOPHEN 650 MG: 325 TABLET ORAL at 01:04

## 2019-04-02 RX ADMIN — Medication 3 ML: at 10:04

## 2019-04-02 RX ADMIN — TACROLIMUS 1 MG: 1 CAPSULE ORAL at 06:04

## 2019-04-02 RX ADMIN — VILAZODONE HYDROCHLORIDE 40 MG: 40 TABLET ORAL at 09:04

## 2019-04-02 RX ADMIN — Medication 3 ML: at 05:04

## 2019-04-02 RX ADMIN — FAMOTIDINE 20 MG: 20 TABLET ORAL at 10:04

## 2019-04-02 RX ADMIN — ATORVASTATIN CALCIUM 40 MG: 10 TABLET, FILM COATED ORAL at 09:04

## 2019-04-02 RX ADMIN — INSULIN ASPART 6 UNITS: 100 INJECTION, SOLUTION INTRAVENOUS; SUBCUTANEOUS at 11:04

## 2019-04-02 RX ADMIN — Medication 3 ML: at 02:04

## 2019-04-02 RX ADMIN — TACROLIMUS 1 MG: 1 CAPSULE ORAL at 09:04

## 2019-04-02 RX ADMIN — LORAZEPAM 1 MG: 1 TABLET ORAL at 10:04

## 2019-04-02 RX ADMIN — ERGOCALCIFEROL 50000 UNITS: 1.25 CAPSULE, LIQUID FILLED ORAL at 09:04

## 2019-04-02 RX ADMIN — MAGNESIUM OXIDE TAB 400 MG (241.3 MG ELEMENTAL MG) 800 MG: 400 (241.3 MG) TAB at 09:04

## 2019-04-02 NOTE — UM SECONDARY REVIEW
Physician Advisor External    Level of Care Issue    Approved Inpatient- 04/02/2019 @ 1030- EHR determination:  Inpatient per Dr. Alex Andrade..

## 2019-04-02 NOTE — CONSULTS
[facility]  Infectious Disease  Consult Note    Patient Name: Andra Newell  MRN: [id number]  Admission Date: 4/2/2019  Hospital Length of Stay: 0 days  Attending Physician: Destin Sanchez MD  Primary Care Provider: Gita Cohen MD     Isolation Status: No active isolations      Inpatient consult to Infectious Diseases  Consult performed by: Cammie Kessler MD  Consult ordered by: Bogdan Pineda MD        Consult received, full consult to follow

## 2019-04-02 NOTE — ED NOTES
Presents to the ER with c/o fever of 100.7. Patient reports having a left kidney transplant on 1/14/2019. Associated complaints this evening are weakness and nausea. No complaint of abdominal pain at this time. Mucous membranes are pink and moist. Skin is warm, dry and intact. Lungs are clear bilaterally, respirations are regular and unlabored. Denies cough, congestion, rhinorrhea or SOB. BS active x4, no tenderness with palpation, abd is soft and not distended. Denies any appetite or activity change. S1S2, capillary refill is < 2 seconds. Denies dysuria, difficulty urinating, frequency, numbness, tingling or weakness. YARELIS CALIX

## 2019-04-02 NOTE — ED PROVIDER NOTES
Encounter Date: 4/1/2019       History     Chief Complaint   Patient presents with    Fever Post Transplant     Pt transported from [facility] for fever, s/p renal transplant in jan 2019     Patient is a 59 year old female with PMHX of kidney transplant 01/14/19, d-CHF with 55%EF, HTN, HLD, DM2, thyroid disease, GERD, anemia, depression, and hx of MRSA. She presents to the ED for fever. Patient was transferred from [facility]. She reports having a fever of 100.7 since today. Reports associated generalized weakness, nausea, diarrhea, and dysuria. Patient currently taking cipro for UTI. She denies chills, vomiting, sob, chest pain, abd pain, or constipation. She is a non smoker and denies alcohol use.        Review of patient's allergies indicates:   Allergen Reactions    Torsemide Diarrhea     Diarrhea stopped once discontinued med    Codeine Itching     Can take oxycodone and hydrocodone with Benadryl     Past Medical History:   Diagnosis Date    Anemia     Anemia in chronic kidney disease, on chronic dialysis 7/12/2017    Arthritis     Asthma     allergic airway    CKD stage III (moderate) 2/18/2019    Colon polyp     Depression     Diabetes mellitus     Diverticulosis     Eosinophilia     ESRD (end stage renal disease)     stage V, due for living donor 9/2018    ESRD on dialysis     GERD (gastroesophageal reflux disease)     Gout     Gout     Hyperlipidemia     Hypertension     Low back pain     MRSA carrier     Obesity     Renal disorder     Rotator cuff syndrome--left     Thyroid disease     Ulnar neuropathy of right upper extremity     Uncontrolled type 2 diabetes mellitus with hyperglycemia      Past Surgical History:   Procedure Laterality Date    ACHILLES TENDON SURGERY Left May 2015    ARTHROSCOPY, HIP Left 10/3/2013    [facility]    ARTHROSCOPY-HIP WITH TROCHANTERIC BURSECTOMY Left 10/3/2013    Per[facility]  at [facility] OR    ARTHROSCOPY-SHOULDER Left 11/24/2015    [facility] at [facility]    ARTHROSCOPY-SHOULDER WITH SUBACROMIAL DECOMPRESSION Left 7/12/2016    [facility] at [facility]    BACK SURGERY      CHOLECYSTECTOMY      COLONOSCOPY N/A 9/6/2017    [facility]    DEBRIDEMENT-SHOULDER-ARTHROSCOPIC Left 7/12/2016    [facility] at [facility] OR    DEBRIDEMENT-SHOULDER-ARTHROSCOPIC; LABRAL Left 11/24/2015    [facility] at [facility] OR    DIALYSIS FISTULA CREATION  12/2017    FEMOROPLASTY, ARTHROSCOPIC Left 10/3/2013    [facility] at [facility]    HEMORRHOID SURGERY      INJECTION-AMNIOX Left 7/12/2016    [facility] at [facility] OR    JOINT REPLACEMENT      left    KNEE SURGERY      LYSIS-ADHESION Left 11/24/2015    [facility] at [facility]    REPAIR ROTATOR CUFF ARTHROSCOPIC Left 7/12/2016    [facility] at [facility] OR    REPAIR-TENDON-BICEP Left 11/24/2015    [facility] at [facility] OR    SHOULDER ARTHROSCOPY      SHOULDER SURGERY      TRANSPLANT, KIDNEY N/A 1/14/2019    [facility] [place] [address] [place] [address]    UPPER GASTROINTESTINAL ENDOSCOPY  ~2013     [facility]     Family History   Problem Relation Age of Onset    Diabetes Mother     Alzheimer's disease Mother     Thyroid disease Mother     Hyperlipidemia Mother     Heart disease Father     Stroke Father     Diabetes Sister     Lupus Sister     Ovarian cancer Sister     Heart disease Brother     No Known Problems Son     Diabetes Maternal Grandmother     Hypertension Maternal Grandmother     No Known Problems Paternal Grandmother     No Known Problems Paternal Grandfather     COPD Maternal Aunt     Diabetes Maternal Aunt     Heart disease Maternal Aunt     Hypertension Maternal Aunt     No Known Problems Maternal Uncle     No Known  Problems Paternal Aunt     No Known Problems Paternal Uncle     Colon cancer Neg Hx     Colon polyps Neg Hx     Crohn's disease Neg Hx     Ulcerative colitis Neg Hx     Celiac disease Neg Hx      Social History     Tobacco Use    Smoking status: Never Smoker    Smokeless tobacco: Never Used   Substance Use Topics    Alcohol use: No    Drug use: No     Review of Systems   Constitutional: Positive for fever.   HENT: Negative for sore throat.    Respiratory: Negative for shortness of breath.    Cardiovascular: Negative for chest pain.   Gastrointestinal: Positive for diarrhea and nausea. Negative for abdominal pain and vomiting.   Genitourinary: Positive for dysuria.   Musculoskeletal: Negative for back pain.   Skin: Negative for rash.   Allergic/Immunologic: Positive for immunocompromised state.   Neurological: Positive for weakness.   Hematological: Does not bruise/bleed easily.       Physical Exam     Initial Vitals [04/01/19 2347]   BP Pulse Resp Temp SpO2   130/72 98 20 (!) 101.2 °F (38.4 °C) 98 %      MAP       --         Physical Exam    Vitals reviewed.  Constitutional: She appears well-developed and well-nourished. No distress.   HENT:   Head: Normocephalic.   Eyes: Conjunctivae are normal.   Neck: Normal range of motion.   Cardiovascular: Normal rate and regular rhythm.   Murmur heard.  Pulmonary/Chest: Breath sounds normal. No respiratory distress. She has no wheezes. She has no rales.   Abdominal: Soft. Bowel sounds are normal. She exhibits no distension. There is no tenderness.   Curvilinear incision to RLQ intact without erythema or drainage.    Musculoskeletal: Normal range of motion.   Neurological: She is alert and oriented to person, place, and time.   Skin: Skin is warm and dry. No erythema.         ED Course   Procedures  Labs Reviewed   MAGNESIUM - Abnormal; Notable for the following components:       Result Value    Magnesium 1.3 (*)     All other components within normal limits    PHOSPHORUS - Abnormal; Notable for the following components:    Phosphorus 2.1 (*)     All other components within normal limits   LIPASE   URINALYSIS, REFLEX TO URINE CULTURE    Narrative:     Preferred Collection Type->Urine, Catheterized   PROCALCITONIN   COMPREHENSIVE METABOLIC PANEL   MAGNESIUM   PHOSPHORUS   CBC W/ AUTO DIFFERENTIAL   TACROLIMUS LEVEL   POCT GLUCOSE MONITORING CONTINUOUS                   APC / Resident Notes:   Patient is a 59 year old female presents to the ED for emergent evaluation of fever.      Will order labs. Will consult kidney transplant surgery, awaiting recommendations. Will continue to monitor.     Differential diagnoses include, but are not limited to: sepsis, acute kidney transplant rejection, UTI, or electrolyte imbalance.     Initial labs completed at previous facility additional analysis reveals UA unremarkable for infectious process. Appreciate [facility] consult. They will admit patient to their service.     I have discussed and reviewed with my supervising physician.        Clinical Impression:       ICD-10-CM ICD-9-CM   1. Kidney transplant recipient Z94.0 V42.0   2. Fever R50.9 780.60         Disposition:   Disposition: Admitted  Condition: Fair                        Jessica Mckeon PA-C  04/02/19 0407

## 2019-04-02 NOTE — SUBJECTIVE & OBJECTIVE
Subjective:   History of Present Illness:  Patient is a 59 y.o. female female who received a living kidney transplant on 1/14/19. Post operative course has been complicated by recurrent UTI with LAM spenceo, currently treating with 10 day course of Cipro, completed 4 days. She reports fatigue over the past 2 or so weeks, often staying in bed through the morning and taking naps in the afternoon. She began having diarrhea over the past month or so also, this has been worked up and was negative for C dif on 3/6 and 3/13. She presented to the ED 4/1 due to fevers which steadily increased to a tmax of 101.2, she denies recent fevers prior to this, even with previous UTI. Reports mild nausea without emesis. She denies dark urine, edema, abdominal pain, or decreased UOP.  Admit to TSU for further work-up.    Hospital Course:  Pt not feeling well this am. Admitted overnight for fevers with neutropenia, reports having chills since last night. Viral PCR to be obtained. CMV PCR 4/2 pending. Given dose of zosyn in ED on admit, transitioned to Cefepime 4/2 for ongoing UTI. ID consulted. Appreciate recs. Kidney US 4/2 showed a fluid collection inferior to the surgical incision measuring 14.0 x 1.2 x 3.1 cm, mildly decreased in size from prior measurement of 12.7 x 1.9 x 6.6 cm. Renal fxn stable. Replace low Mg. VSS. Cont to monitor         Ms. Newell is a 59 y.o. year old female who is status post Kidney Transplant - 1/14/2019  (#1).    Her maintenance immunosuppression consists of:   Immunosuppressants (From admission, onward)    Start     Stop Route Frequency Ordered    04/02/19 0800  tacrolimus capsule 1 mg      -- Oral 2 times daily 04/02/19 0149          Hospital Course:  No notes on file    Past Medical, Surgical, Family, and Social History:   Unchanged from H&P.    Scheduled Meds:   atorvastatin  40 mg Oral Daily    atovaquone  1,500 mg Oral Daily    ceFEPime (MAXIPIME) IVPB  2 g Intravenous Q8H    ergocalciferol   50,000 Units Oral Q7 Days    famotidine  20 mg Oral BID    insulin detemir U-100  20 Units Subcutaneous QHS    INV acyclovir/placebo  400 mg Oral Q12H    INV [id number]/placebo  480 mg Oral Daily    INV valganciclovir/placebo  900 mg Oral Daily    levothyroxine  75 mcg Oral Before breakfast    LORazepam  1 mg Oral BID    magnesium oxide  800 mg Oral Daily    magnesium sulfate in water  2 g Intravenous Once    multivitamin  1 tablet Oral Daily    sodium chloride 0.9%  3 mL Intravenous Q8H    tacrolimus  1 mg Oral BID    vilazodone  40 mg Oral Daily     Continuous Infusions:  PRN Meds:acetaminophen, dextrose 50%, dextrose 50%, glucagon (human recombinant), glucose, glucose, insulin aspart U-100, ondansetron    Intake/Output - Last 3 Shifts       03/31 0700 - 04/01 0659 04/01 0700 - 04/02 0659 04/02 0700 - 04/03 0659    I.V. (mL/kg)   3 (0)    IV Piggyback  1100     Total Intake(mL/kg)  1100 (11.2) 3 (0)    Net  +1100 +3                  Review of Systems   Constitutional: Positive for activity change, appetite change, chills, fatigue and fever.   Respiratory: Negative for cough and shortness of breath.    Cardiovascular: Negative for chest pain and leg swelling.   Gastrointestinal: Negative for abdominal distention, abdominal pain, constipation, diarrhea, nausea and vomiting.   Genitourinary: Negative for decreased urine volume, difficulty urinating and dysuria.   Allergic/Immunologic: Positive for immunocompromised state.   Neurological: Positive for weakness.   Psychiatric/Behavioral: Negative for agitation and behavioral problems.      Objective:     Vital Signs (Most Recent):  Temp: 98.6 °F (37 °C) (04/02/19 1543)  Pulse: 62 (04/02/19 1539)  Resp: 19 (04/02/19 1539)  BP: 135/73 (04/02/19 1533)  SpO2: 97 % (04/02/19 1539) Vital Signs (24h Range):  Temp:  [98.2 °F (36.8 °C)-101.2 °F (38.4 °C)] 98.6 °F (37 °C)  Pulse:  [] 62  Resp:  [15-38] 19  SpO2:  [94 %-98 %] 97 %  BP: (108-176)/(53-73) 589/73  "    Weight: 98 kg (216 lb)  Height: 5' 7" (170.2 cm)  Body mass index is 33.83 kg/m².    Physical Exam   Constitutional: She is oriented to person, place, and time. She appears well-developed.   Eyes: Pupils are equal, round, and reactive to light. EOM are normal. No scleral icterus.   Neck: Normal range of motion.   Cardiovascular: Normal rate and regular rhythm.   No murmur heard.  Pulmonary/Chest: Effort normal and breath sounds normal. No respiratory distress. She has no wheezes.   Abdominal: Soft. Bowel sounds are normal. She exhibits no distension. There is no tenderness. There is no guarding.   Well healing RLQ incisional scar   Musculoskeletal: Normal range of motion. She exhibits no edema.   Neurological: She is alert and oriented to person, place, and time.   Skin: Skin is warm and dry.   Vitals reviewed.      Laboratory:  CBC:   Recent Labs   Lab 04/01/19 2207 04/02/19  0552 04/02/19  1443   WBC 5.00 1.70* 2.64*   RBC 2.81* 4.32 2.37*   HGB 8.3* 13.1 7.3*   HCT 25.5* 40.6 22.2*    115* 155   MCV 90 94 94   MCH 29.6 30.3 30.8   MCHC 32.7 32.3 32.9     CMP:   Recent Labs   Lab 04/01/19  1012 04/01/19 2207 04/02/19  0552   * 238* 189*   CALCIUM 9.5 9.5 8.8   ALBUMIN 3.8 3.8 3.3*   PROT  --  6.7 6.1    140 138   K 5.1 4.6 4.2   CO2 25 26 26    104 106   BUN 12 14 14   CREATININE 1.2 1.3 1.2   ALKPHOS  --  156* 139*   ALT  --  30 27   AST  --  24 23     Labs within the past 24 hours have been reviewed.    Diagnostic Results:  None  "

## 2019-04-02 NOTE — ASSESSMENT & PLAN NOTE
- S/p Living kidney transplant 2/2 DM/HTN on 1/14. Hx recurrent Kleb pneumo UTI (1/30, 2/11, 2/25, 3/26)  - Kidney US 4/2: collection inferior to the surgical incision measuring 14.0 x 1.2 x 3.1 cm, mildly decreased in size from prior measurement of 12.7 x 1.9 x 6.6 cm.  - Kidney fxn stable

## 2019-04-02 NOTE — ED PROVIDER NOTES
Encounter Date: 4/1/2019    SCRIBE #1 NOTE: I, Jaymie Lawrence , am scribing for, and in the presence of,  Dr. Schmitz . I have scribed the entire note.       History     Chief Complaint   Patient presents with    Fever     11 weeks out from kidney transplant. Started around 7:30    Nausea    Weakness         04/01/2019  9:37 PM       The patient is a 59 y.o. female who is presenting with the acute onset of a fever that began several hours PTA with a Tmax of 100.7 degrees F. No exacerbating factors. The pt denies attempting treatment PTA. Associated sxs includes nausea. She denies recent abdominal pain, worsening back pain, chills, emesis, or diarrhea. The pt is x 2 months kidney transplant recipient and confirms taking rejection medication as prescribed. She is currently on Ciproflaxin for treatment of UTI. Pertinent PMHx includes CKD, DM, HTN, chronic back pain. Pertinent past surgical hx includes kidney transplant.                   Review of patient's allergies indicates:   Allergen Reactions    Torsemide Diarrhea     Diarrhea stopped once discontinued med    Codeine Itching     Can take oxycodone and hydrocodone with Benadryl     Past Medical History:   Diagnosis Date    Anemia     Anemia in chronic kidney disease, on chronic dialysis 7/12/2017    Arthritis     Asthma     allergic airway    CKD stage III (moderate) 2/18/2019    Colon polyp     Depression     Diabetes mellitus     Diverticulosis     Eosinophilia     ESRD (end stage renal disease)     stage V, due for living donor 9/2018    ESRD on dialysis     GERD (gastroesophageal reflux disease)     Gout     Gout     Hyperlipidemia     Hypertension     Low back pain     MRSA carrier     Obesity     Renal disorder     Rotator cuff syndrome--left     Thyroid disease     Ulnar neuropathy of right upper extremity     Uncontrolled type 2 diabetes mellitus with hyperglycemia      Past Surgical History:   Procedure Laterality Date     ACHILLES TENDON SURGERY Left May 2015    ARTHROSCOPY, HIP Left 10/3/2013    [facility] OR    ARTHROSCOPY-HIP WITH TROCHANTERIC BURSECTOMY Left 10/3/2013    [facility]    ARTHROSCOPY-SHOULDER Left 11/24/2015    [facility] at [facility]    ARTHROSCOPY-SHOULDER WITH SUBACROMIAL DECOMPRESSION Left 7/12/2016    [facility] at [facility] OR    BACK SURGERY      CHOLECYSTECTOMY      COLONOSCOPY N/A 9/6/2017    [facility]    DEBRIDEMENT-SHOULDER-ARTHROSCOPIC Left 7/12/2016    [facility] OR    DEBRIDEMENT-SHOULDER-ARTHROSCOPIC; LABRAL Left 11/24/2015    [facility] at [facility] OR    DIALYSIS FISTULA CREATION  12/2017    FEMOROPLASTY, ARTHROSCOPIC Left 10/3/2013    [facility]    HEMORRHOID SURGERY      INJECTION-AMNIOX Left 7/12/2016    [facility]    JOINT REPLACEMENT      left    KNEE SURGERY      LYSIS-ADHESION Left 11/24/2015    [facility]    REPAIR ROTATOR CUFF ARTHROSCOPIC Left 7/12/2016    [facility] at [facility]    REPAIR-TENDON-BICEP Left 11/24/2015    [facility] at [facility] OR    SHOULDER ARTHROSCOPY      SHOULDER SURGERY      TRANSPLANT, KIDNEY N/A 1/14/2019    [facility]    UPPER GASTROINTESTINAL ENDOSCOPY  ~2013     [facility]     Family History   Problem Relation Age of Onset    Diabetes Mother     Alzheimer's disease Mother     Thyroid disease Mother     Hyperlipidemia Mother     Heart disease Father     Stroke Father     Diabetes Sister     Lupus Sister     Ovarian cancer Sister     Heart disease Brother     No Known Problems Son     Diabetes Maternal Grandmother     Hypertension Maternal Grandmother     No Known Problems Paternal  Grandmother     No Known Problems Paternal Grandfather     COPD Maternal Aunt     Diabetes Maternal Aunt     Heart disease Maternal Aunt     Hypertension Maternal Aunt     No Known Problems Maternal Uncle     No Known Problems Paternal Aunt     No Known Problems Paternal Uncle     Colon cancer Neg Hx     Colon polyps Neg Hx     Crohn's disease Neg Hx     Ulcerative colitis Neg Hx     Celiac disease Neg Hx      Social History     Tobacco Use    Smoking status: Never Smoker    Smokeless tobacco: Never Used   Substance Use Topics    Alcohol use: No    Drug use: No     Review of Systems   Constitutional: Positive for fever.   Respiratory: Negative for cough and shortness of breath.    Cardiovascular: Negative for chest pain.   Gastrointestinal: Positive for nausea. Negative for abdominal pain, diarrhea and vomiting.   Genitourinary: Negative for flank pain.   Skin: Negative for rash.   Neurological: Negative for headaches.   All other systems reviewed and are negative.      Physical Exam     Initial Vitals [04/01/19 2122]   BP Pulse Resp Temp SpO2   (!) 143/68 103 16 (!) 100.7 °F (38.2 °C) 98 %      MAP       --         Physical Exam    Nursing note and vitals reviewed.  Constitutional: She appears well-developed and well-nourished. She is not diaphoretic.  Non-toxic appearance. She does not have a sickly appearance. She does not appear ill. No distress.   pyrexia    HENT:   Head: Normocephalic and atraumatic.   Eyes: EOM are normal. Pupils are equal, round, and reactive to light.   Neck: Full passive range of motion without pain. Neck supple. No spinous process tenderness and no muscular tenderness present. Normal range of motion present. No neck rigidity.   Cardiovascular: Normal rate, regular rhythm and normal heart sounds. Exam reveals no gallop and no friction rub.    No murmur heard.  No murmur   Pulmonary/Chest: Breath sounds normal. No respiratory distress. She has no wheezes. She has no  rhonchi. She has no rales.   Abdominal: Soft. She exhibits no distension. There is no tenderness. There is no rebound.   Well healed RLQ surgical scar. No CVA or flank tenderness.    Neurological: She is alert and oriented to person, place, and time.   Skin: Skin is warm and dry.   Psychiatric: She has a normal mood and affect. Her behavior is normal. Judgment and thought content normal.         ED Course   Procedures  Labs Reviewed - No data to display       Imaging Results    None          Medical Decision Making:   History:   Old Medical Records: I decided to obtain old medical records.  Clinical Tests:   Lab Tests: Ordered and Reviewed  Radiological Study: Ordered and Reviewed            Scribe Attestation:   Scribe #1: I performed the above scribed service and the documentation accurately describes the services I performed. I attest to the accuracy of the note.            ED Course as of Apr 02 0106 Mon Apr 01, 2019 2134 SpO2: 98 % [EF]   2134 Resp: 16 [EF]   2134 Pulse: 103 [EF]   2134 Temp src: Oral [EF]   2134 Temp(!): 100.7 °F (38.2 °C) [EF]   2134 BP(!): 143/68 [EF]   2152 Case discussed with [facility] transplant surgery dr renteria who recommends transferring the patient to [facility].  He does not want any antibiotics given at this time.  Transfer center working to facilitate transfer.    [EF]      ED Course User Index  [EF] Jean Schmitz MD     Clinical Impression:   No diagnosis found.            I, Dr. Schmitz, personally performed the services described in this documentation. All medical record entries made by the scribe were at my direction and in my presence.  I have reviewed the chart and agree that the record reflects my personal performance and is accurate and complete.1:07 AM 04/02/2019    59-year-old female 3 months out from a renal transplant presents for fever.  Review of the medical record demonstrates she was recently treated for Klebsiella pneumoniae UTI with Cipro.  Urine  culture sensitive to Cipro.  Patient transferred to [place] for transplant surgery Services which we do not have here.  I did speak with transplant surgery who does not recommend any IV antibiotics at this time.                 Jean Schmitz MD  04/02/19 0108

## 2019-04-02 NOTE — HPI
Patient is a 59 y.o. female female who received a living kidney transplant on 1/14/19. Post operative course has been complicated by recurrent UTI with K pneumo, currently treating with 10 day course of Cipro, completed 4 days. She reports fatigue over the past 2 or so weeks, often staying in bed through the morning and taking naps in the afternoon. She began having diarrhea over the past month or so also, this has been worked up and was negative for C dif on 3/6 and 3/13. She presented to the ED 4/1 due to fevers which steadily increased to a tmax of 101.2, she denies recent fevers prior to this, even with previous UTI. Reports mild nausea without emesis. She denies dark urine, edema, abdominal pain, or decreased UOP. Admit to TSU for further work-up.    Hospital Course:  Admitted for fevers and neutropenia, reports having chills x 1 day. Viral PCR pending. CMV PCR 4/2 pending. A dose of zosyn in ED on admit, transitioned to Cefepime 4/2 for ongoing UTI. ID consulted, with recs to complete 14 day course of Ertapenem for presumed pyelo, finishing on 4/15. Kidney US 4/2 showed a fluid collection inferior to the surgical incision measuring 14.0 x 1.2 x 3.1 cm, mildly decreased in size from prior measurement of 12.7 x 1.9 x 6.6 cm. Renal fxn stable. CT scan showing small amt of fluid along incision line, reviewed with surgeon. Not amenable for draining at this time. Pt reports multiple bouts of diarrhea. Stool studies pending. CMV undetected. Viral panel negative. Neurology consulted for weakness on ambulation. Clinical description suggestive of neurogenic claudication related to 3 prior lumbar spine surgeries. Per neuro recs, will obtain MRI lumbar spine. VSS. Cont to monitor

## 2019-04-02 NOTE — CLINICAL REVIEW
Staff Transplant Nephrology addendum:  Patient was seen and examined with roxanna rockwell NP        I agree with assessment and plan. I spoke with the patient for 15 minutes and explained  current plan. Patient and family voiced understanding. All questions answered    Febrile illness with neutropenia    3 days h/o not feeling well    CMV pcr     Blood cultures and urine are pending    Viral panel PCR    Transplant ID consult     Kidney US to r/o perinephric fluid collection

## 2019-04-02 NOTE — CONSULTS
History & Physical  General Surgery      SUBJECTIVE:     Chief Complaint/Reason for Admission: Fever    History of Present Illness:  Patient is a 59 y.o. female female who received a living kidney transplant on 1/14/19. Post operative course has been complicated by recurrent UTI with K pneumo, currently treated with Cipro x 5 days. She reports fatigue over the past 2 or so weeks, often staying in bed through the morning and taking naps in the afternoon. She began having diarrhea over the past month or so also, this has been worked up and was negative for C dif on 3/6 and 3/13.   She presented to the ED today due to fevers which steadily increased to a tmax of 101.2, she denies recent fevers prior to this, even with previous UTI. Reports mild nausea without emesis.  She denies dark urine, edema, abdominal pain, or decreased UOP.      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 She briefly required HD 4/13/18 until ~Sept 2018.  she was predialysis at the time of transplant.   -LURD KT (friend) 01/14/2019,  Induced with Campath.     -Recurrent K pneumoniae ESBL 2019 UTI 1/30, 2/11, and  2/25  presents with         Chief Complaint   Patient presents with    Fever Post Transplant     Pt transported from [facility] for fever, s/p renal transplant in jan 2019         (Not in a hospital admission)    Review of patient's allergies indicates:   Allergen Reactions    Torsemide Diarrhea     Diarrhea stopped once discontinued med    Codeine Itching     Can take oxycodone and hydrocodone with Benadryl       Past Medical History:   Diagnosis Date    Anemia     Anemia in chronic kidney disease, on chronic dialysis 7/12/2017    Arthritis     Asthma     allergic airway    CKD stage III (moderate) 2/18/2019    Colon polyp     Depression     Diabetes mellitus     Diverticulosis     Eosinophilia     ESRD (end stage renal disease)     stage V, due for  living donor 9/2018    ESRD on dialysis     GERD (gastroesophageal reflux disease)     Gout     Gout     Hyperlipidemia     Hypertension     Low back pain     MRSA carrier     Obesity     Renal disorder     Rotator cuff syndrome--left     Thyroid disease     Ulnar neuropathy of right upper extremity     Uncontrolled type 2 diabetes mellitus with hyperglycemia      Past Surgical History:   Procedure Laterality Date    ACHILLES TENDON SURGERY Left May 2015    ARTHROSCOPY, HIP Left 10/3/2013    [facility]    ARTHROSCOPY-HIP WITH TROCHANTERIC BURSECTOMY Left 10/3/2013    [facility]    ARTHROSCOPY-SHOULDER Left 11/24/2015    [facility]    ARTHROSCOPY-SHOULDER WITH SUBACROMIAL DECOMPRESSION Left 7/12/2016    [facility]    BACK SURGERY      CHOLECYSTECTOMY      COLONOSCOPY N/A 9/6/2017    [facility]    DEBRIDEMENT-SHOULDER-ARTHROSCOPIC Left 7/12/2016    [facility] OR    DEBRIDEMENT-SHOULDER-ARTHROSCOPIC; LABRAL Left 11/24/2015    [facility]    DIALYSIS FISTULA CREATION  12/2017    FEMOROPLASTY, ARTHROSCOPIC Left 10/3/2013    [facility] OR    HEMORRHOID SURGERY      INJECTION-AMNIOX Left 7/12/2016    [facility]    JOINT REPLACEMENT      left    KNEE SURGERY      LYSIS-ADHESION Left 11/24/2015    [facility]    REPAIR ROTATOR CUFF ARTHROSCOPIC Left 7/12/2016    [facility]    REPAIR-TENDON-BICEP Left 11/24/2015    [facility] OR    SHOULDER ARTHROSCOPY      SHOULDER SURGERY      TRANSPLANT, KIDNEY N/A 1/14/2019    [facility]    UPPER GASTROINTESTINAL ENDOSCOPY  ~2013    Dr. Moralez      Family History   Problem Relation Age of Onset    Diabetes Mother     Alzheimer's disease Mother     Thyroid disease Mother     Hyperlipidemia Mother     Heart disease Father     Stroke Father     Diabetes Sister     Lupus Sister     Ovarian cancer Sister     Heart disease Brother     No Known Problems Son     Diabetes Maternal Grandmother     Hypertension Maternal Grandmother     No Known Problems Paternal Grandmother     No Known Problems Paternal Grandfather     COPD Maternal Aunt     Diabetes Maternal Aunt     Heart disease Maternal Aunt     Hypertension Maternal Aunt     No Known Problems Maternal Uncle     No Known Problems Paternal Aunt     No Known Problems Paternal Uncle     Colon cancer Neg Hx     Colon polyps Neg Hx     Crohn's disease Neg Hx     Ulcerative colitis Neg Hx     Celiac disease Neg Hx      Social History     Tobacco Use    Smoking status: Never Smoker    Smokeless tobacco: Never Used   Substance Use Topics    Alcohol use: No    Drug use: No        Review of Systems   Constitutional: Positive for activity change, chills and fever.   Respiratory: Negative for cough, chest tightness and shortness of breath.    Cardiovascular: Negative for chest pain and palpitations.   Gastrointestinal: Positive for diarrhea and nausea. Negative for abdominal distention, abdominal pain, blood in stool, constipation and vomiting.   Genitourinary: Negative for decreased urine volume, dysuria, frequency and hematuria.   Skin: Negative for color change and pallor.   Neurological: Positive for headaches. Negative for syncope and weakness.        OBJECTIVE:     Vital Signs (Most Recent)  Temp: (!) 101.2 °F (38.4 °C) (04/01/19 2347)  Pulse: 87 (04/02/19 0108)  Resp: (!) 22 (04/02/19 0108)  BP: 125/60 (04/02/19 0101)  SpO2: 97 % (04/02/19 0108)    Physical Exam   Constitutional: She is oriented to person, place, and time. She appears well-developed and well-nourished. No distress.    Cardiovascular: Normal rate and regular rhythm.   Pulmonary/Chest: Effort normal. No respiratory distress.   Abdominal: Soft. She exhibits no distension. There is no tenderness. There is no guarding.   Neurological: She is alert and oriented to person, place, and time.   Skin: Skin is warm and dry. Capillary refill takes less than 2 seconds.     Laboratory  CBC:   Recent Labs   Lab 04/01/19  2207   WBC 5.00   RBC 2.81*   HGB 8.3*   HCT 25.5*      MCV 90   MCH 29.6   MCHC 32.7     CMP:   Recent Labs   Lab 04/01/19  2207   *   CALCIUM 9.5   ALBUMIN 3.8   PROT 6.7      K 4.6   CO2 26      BUN 14   CREATININE 1.3   ALKPHOS 156*   ALT 30   AST 24   BILITOT 0.4     Recent Labs   Lab 04/02/19  0052   COLORU Yellow   SPECGRAV 1.015   PHUR 5.0   PROTEINUA Negative   NITRITE Negative   LEUKOCYTESUR Negative       Diagnostic Results:  X-Ray: Reviewed    ASSESSMENT/PLAN:     58 y/o F s/p JANETT ODOM in Jan post operative course c/b recurrent UTI, presenting with fever today    - Admit to K Txp team  - Home meds ordered  - Cultures obtained by ED  - Start empiric Zosyn for history of ESBL organisms  - Consult to ABDOUL WEBBER MD   Pager: [phone]  General Surgery PGY-II  [facility]

## 2019-04-02 NOTE — PROGRESS NOTES
[facility]  Kidney Transplant  Progress Note      Reason for Follow-up: Reassessment of Kidney Transplant - 1/14/2019  (#1) recipient and management of immunosuppression.    ORGAN: LEFT KIDNEY    Donor Type: Living    Donor CMV Status:   Donor HBcAB:  Donor HCV Status:  Donor HBV DERRICK:   Donor HCV DERRICK:       Subjective:   History of Present Illness:  Patient is a 59 y.o. female female who received a living kidney transplant on 1/14/19. Post operative course has been complicated by recurrent UTI with K jordyo, currently treating with 10 day course of Cipro, completed 4 days. She reports fatigue over the past 2 or so weeks, often staying in bed through the morning and taking naps in the afternoon. She began having diarrhea over the past month or so also, this has been worked up and was negative for C dif on 3/6 and 3/13. She presented to the ED 4/1 due to fevers which steadily increased to a tmax of 101.2, she denies recent fevers prior to this, even with previous UTI. Reports mild nausea without emesis. She denies dark urine, edema, abdominal pain, or decreased UOP.  Admit to TSU for further work-up.    Hospital Course:  Pt not feeling well this am. Admitted overnight for fevers with neutropenia, reports having chills since last night. Viral PCR to be obtained. CMV PCR 4/2 pending. Given dose of zosyn in ED on admit, transitioned to Cefepime 4/2 for ongoing UTI. ID consulted. Appreciate recs. Kidney US 4/2 showed a fluid collection inferior to the surgical incision measuring 14.0 x 1.2 x 3.1 cm, mildly decreased in size from prior measurement of 12.7 x 1.9 x 6.6 cm. Renal fxn stable. Replace low Mg. VSS. Cont to monitor         Ms. Newell is a 59 y.o. year old female who is status post Kidney Transplant - 1/14/2019  (#1).    Her maintenance immunosuppression consists of:   Immunosuppressants (From admission, onward)    Start     Stop Route Frequency Ordered    04/02/19 0800  tacrolimus capsule 1  mg      -- Oral 2 times daily 04/02/19 0149          Hospital Course:  No notes on file    Past Medical, Surgical, Family, and Social History:   Unchanged from H&P.    Scheduled Meds:   atorvastatin  40 mg Oral Daily    atovaquone  1,500 mg Oral Daily    ceFEPime (MAXIPIME) IVPB  2 g Intravenous Q8H    ergocalciferol  50,000 Units Oral Q7 Days    famotidine  20 mg Oral BID    insulin detemir U-100  20 Units Subcutaneous QHS    INV acyclovir/placebo  400 mg Oral Q12H    INV [id number]/placebo  480 mg Oral Daily    INV valganciclovir/placebo  900 mg Oral Daily    levothyroxine  75 mcg Oral Before breakfast    LORazepam  1 mg Oral BID    magnesium oxide  800 mg Oral Daily    magnesium sulfate in water  2 g Intravenous Once    multivitamin  1 tablet Oral Daily    sodium chloride 0.9%  3 mL Intravenous Q8H    tacrolimus  1 mg Oral BID    vilazodone  40 mg Oral Daily     Continuous Infusions:  PRN Meds:acetaminophen, dextrose 50%, dextrose 50%, glucagon (human recombinant), glucose, glucose, insulin aspart U-100, ondansetron    Intake/Output - Last 3 Shifts       03/31 0700 - 04/01 0659 04/01 0700 - 04/02 0659 04/02 0700 - 04/03 0659    I.V. (mL/kg)   3 (0)    IV Piggyback  1100     Total Intake(mL/kg)  1100 (11.2) 3 (0)    Net  +1100 +3                  Review of Systems   Constitutional: Positive for activity change, appetite change, chills, fatigue and fever.   Respiratory: Negative for cough and shortness of breath.    Cardiovascular: Negative for chest pain and leg swelling.   Gastrointestinal: Negative for abdominal distention, abdominal pain, constipation, diarrhea, nausea and vomiting.   Genitourinary: Negative for decreased urine volume, difficulty urinating and dysuria.   Allergic/Immunologic: Positive for immunocompromised state.   Neurological: Positive for weakness.   Psychiatric/Behavioral: Negative for agitation and behavioral problems.      Objective:     Vital Signs (Most Recent):  Temp:  "98.6 °F (37 °C) (04/02/19 1543)  Pulse: 62 (04/02/19 1539)  Resp: 19 (04/02/19 1539)  BP: 135/73 (04/02/19 1533)  SpO2: 97 % (04/02/19 1539) Vital Signs (24h Range):  Temp:  [98.2 °F (36.8 °C)-101.2 °F (38.4 °C)] 98.6 °F (37 °C)  Pulse:  [] 62  Resp:  [15-38] 19  SpO2:  [94 %-98 %] 97 %  BP: (108-176)/(53-73) 135/73     Weight: 98 kg (216 lb)  Height: 5' 7" (170.2 cm)  Body mass index is 33.83 kg/m².    Physical Exam   Constitutional: She is oriented to person, place, and time. She appears well-developed.   Eyes: Pupils are equal, round, and reactive to light. EOM are normal. No scleral icterus.   Neck: Normal range of motion.   Cardiovascular: Normal rate and regular rhythm.   No murmur heard.  Pulmonary/Chest: Effort normal and breath sounds normal. No respiratory distress. She has no wheezes.   Abdominal: Soft. Bowel sounds are normal. She exhibits no distension. There is no tenderness. There is no guarding.   Well healing RLQ incisional scar   Musculoskeletal: Normal range of motion. She exhibits no edema.   Neurological: She is alert and oriented to person, place, and time.   Skin: Skin is warm and dry.   Vitals reviewed.      Laboratory:  CBC:   Recent Labs   Lab 04/01/19 2207 04/02/19  0552 04/02/19  1443   WBC 5.00 1.70* 2.64*   RBC 2.81* 4.32 2.37*   HGB 8.3* 13.1 7.3*   HCT 25.5* 40.6 22.2*    115* 155   MCV 90 94 94   MCH 29.6 30.3 30.8   MCHC 32.7 32.3 32.9     CMP:   Recent Labs   Lab 04/01/19  1012 04/01/19 2207 04/02/19  0552   * 238* 189*   CALCIUM 9.5 9.5 8.8   ALBUMIN 3.8 3.8 3.3*   PROT  --  6.7 6.1    140 138   K 5.1 4.6 4.2   CO2 25 26 26    104 106   BUN 12 14 14   CREATININE 1.2 1.3 1.2   ALKPHOS  --  156* 139*   ALT  --  30 27   AST  --  24 23     Labs within the past 24 hours have been reviewed.    Diagnostic Results:  None    Assessment/Plan:     Fever  - a/w temp 100.7. Tylenol PRN for relief  - Infectious w/up initiated  - Viral PCR pending    UTI " (urinary tract infection)  - Post-op w/ recurrent Kleb pneumo UTI (1/30, 2/11, 2/25, 3/26)  - recently on Cipro, completed 4/10 day course. Rceived dose of zosyn in ED yesterday. Transitioned to Cefepime 4/2.   - Infectious w/u initiated. ID consulted. Appreciate recs  - denies urinary symptoms. Good UOP. Cont to monitor      Kidney transplant recipient  - S/p Living kidney transplant 2/2 DM/HTN on 1/14. Hx recurrent Kleb pneumo UTI (1/30, 2/11, 2/25, 3/26)  - Kidney US 4/2: collection inferior to the surgical incision measuring 14.0 x 1.2 x 3.1 cm, mildly decreased in size from prior measurement of 12.7 x 1.9 x 6.6 cm.  - Kidney fxn stable          Hypomagnesemia  - Replace PRN. Monitor      At risk for opportunistic infections  - Continue atovaquone for PCP prophylaxis   - part of CMV study.   - CMV PCR 4/2 pending        Long-term use of immunosuppressant medication  - Continue Prograf. Will monitor for signs of toxic side effects, check daily tacrolimus troughs, and change meds accordingly  - cellcept on hold for leukopenia              Discharge Planning: pending patient symptoms    Ginny West PA-C  Kidney Transplant  [facility]

## 2019-04-02 NOTE — ASSESSMENT & PLAN NOTE
- Continue Prograf. Will monitor for signs of toxic side effects, check daily tacrolimus troughs, and change meds accordingly  - cellcept on hold for leukopenia

## 2019-04-02 NOTE — ED NOTES
Patient now accepted in transfer to [facility] ED by Dr. Bettencourt; call report to:  [phone].  Transfer Center arranging transport within the hour.

## 2019-04-02 NOTE — ASSESSMENT & PLAN NOTE
- Post-op w/ recurrent Kleb pneumo UTI (1/30, 2/11, 2/25, 3/26)  - recently on Cipro, completed 4/10 day course. Rceived dose of zosyn in ED yesterday. Transitioned to Cefepime 4/2.   - Infectious w/u initiated. ID consulted. Appreciate recs  - denies urinary symptoms. Good UOP. Cont to monitor

## 2019-04-02 NOTE — TELEPHONE ENCOUNTER
Reason for Disposition   General information question, no triage required and triager able to answer question    Protocols used: INFORMATION ONLY CALL-A-    No BPA  Kidney transplant 1/14/19    Had chills earlier this evening, took temps:    99.5 @1920  100.7 @ 2020  101.1 @ prior to call    Patient is in the waiting room already of ED on the [facility].  Wanted to make tx team aware

## 2019-04-03 ENCOUNTER — TELEPHONE (OUTPATIENT)
Dept: SPORTS MEDICINE | Facility: CLINIC | Age: 59
End: 2019-04-03

## 2019-04-03 PROBLEM — R29.898 LEG WEAKNESS: Status: ACTIVE | Noted: 2019-04-03

## 2019-04-03 LAB
ALBUMIN SERPL BCP-MCNC: 3.3 G/DL (ref 3.5–5.2)
ALP SERPL-CCNC: 128 U/L (ref 55–135)
ALT SERPL W/O P-5'-P-CCNC: 31 U/L (ref 10–44)
ANION GAP SERPL CALC-SCNC: 11 MMOL/L (ref 8–16)
AST SERPL-CCNC: 35 U/L (ref 10–40)
BASOPHILS # BLD AUTO: 0.03 K/UL (ref 0–0.2)
BASOPHILS NFR BLD: 0.9 % (ref 0–1.9)
BILIRUB SERPL-MCNC: 0.4 MG/DL (ref 0.1–1)
BILIRUB UR QL STRIP: NEGATIVE
BUN SERPL-MCNC: 13 MG/DL (ref 8–23)
CALCIUM SERPL-MCNC: 8.8 MG/DL (ref 8.7–10.5)
CHLORIDE SERPL-SCNC: 107 MMOL/L (ref 95–110)
CLARITY UR REFRACT.AUTO: CLEAR
CMV DNA SERPL NAA+PROBE-ACNC: NORMAL IU/ML
CO2 SERPL-SCNC: 21 MMOL/L (ref 23–29)
COLOR UR AUTO: YELLOW
CREAT SERPL-MCNC: 1 MG/DL (ref 0.5–1.4)
DIFFERENTIAL METHOD: ABNORMAL
ENTEROVIRUS: NOT DETECTED
EOSINOPHIL # BLD AUTO: 0.1 K/UL (ref 0–0.5)
EOSINOPHIL NFR BLD: 1.5 % (ref 0–8)
ERYTHROCYTE [DISTWIDTH] IN BLOOD BY AUTOMATED COUNT: 15.7 % (ref 11.5–14.5)
EST. GFR  (AFRICAN AMERICAN): >60 ML/MIN/1.73 M^2
EST. GFR  (NON AFRICAN AMERICAN): >60 ML/MIN/1.73 M^2
FERRITIN SERPL-MCNC: 383 NG/ML (ref 20–300)
GLUCOSE SERPL-MCNC: 163 MG/DL (ref 70–110)
GLUCOSE UR QL STRIP: NEGATIVE
HCT VFR BLD AUTO: 24 % (ref 37–48.5)
HGB BLD-MCNC: 7.9 G/DL (ref 12–16)
HGB UR QL STRIP: NEGATIVE
HUMAN BOCAVIRUS: NOT DETECTED
HUMAN CORONAVIRUS, COMMON COLD VIRUS: NOT DETECTED
IMM GRANULOCYTES # BLD AUTO: 0.16 K/UL (ref 0–0.04)
IMM GRANULOCYTES NFR BLD AUTO: 4.9 % (ref 0–0.5)
INFLUENZA A - H1N1-09: NOT DETECTED
IRON SERPL-MCNC: 73 UG/DL (ref 30–160)
KETONES UR QL STRIP: NEGATIVE
LEUKOCYTE ESTERASE UR QL STRIP: NEGATIVE
LYMPHOCYTES # BLD AUTO: 0.5 K/UL (ref 1–4.8)
LYMPHOCYTES NFR BLD: 16.5 % (ref 18–48)
MAGNESIUM SERPL-MCNC: 1.9 MG/DL (ref 1.6–2.6)
MCH RBC QN AUTO: 30.6 PG (ref 27–31)
MCHC RBC AUTO-ENTMCNC: 32.9 G/DL (ref 32–36)
MCV RBC AUTO: 93 FL (ref 82–98)
MONOCYTES # BLD AUTO: 0.4 K/UL (ref 0.3–1)
MONOCYTES NFR BLD: 12.2 % (ref 4–15)
NEUTROPHILS # BLD AUTO: 2.1 K/UL (ref 1.8–7.7)
NEUTROPHILS NFR BLD: 64 % (ref 38–73)
NITRITE UR QL STRIP: NEGATIVE
NRBC BLD-RTO: 0 /100 WBC
PARAINFLUENZA: NOT DETECTED
PH UR STRIP: 5 [PH] (ref 5–8)
PHOSPHATE SERPL-MCNC: 4 MG/DL (ref 2.7–4.5)
PLATELET # BLD AUTO: 186 K/UL (ref 150–350)
PMV BLD AUTO: 10 FL (ref 9.2–12.9)
POCT GLUCOSE: 202 MG/DL (ref 70–110)
POCT GLUCOSE: 213 MG/DL (ref 70–110)
POCT GLUCOSE: 284 MG/DL (ref 70–110)
POTASSIUM SERPL-SCNC: 4.6 MMOL/L (ref 3.5–5.1)
PROT SERPL-MCNC: 6.4 G/DL (ref 6–8.4)
PROT UR QL STRIP: NEGATIVE
RBC # BLD AUTO: 2.58 M/UL (ref 4–5.4)
RVP - ADENOVIRUS: NOT DETECTED
RVP - HUMAN METAPNEUMOVIRUS (HMPV): NOT DETECTED
RVP - INFLUENZA A: NOT DETECTED
RVP - INFLUENZA B: NOT DETECTED
RVP - RESPIRATORY SYNCTIAL VIRUS (RSV) A: NOT DETECTED
RVP - RESPIRATORY VIRAL PANEL, SOURCE: NORMAL
RVP - RHINOVIRUS: NOT DETECTED
SATURATED IRON: 27 % (ref 20–50)
SODIUM SERPL-SCNC: 139 MMOL/L (ref 136–145)
SP GR UR STRIP: 1.01 (ref 1–1.03)
TACROLIMUS BLD-MCNC: 6.5 NG/ML (ref 5–15)
TOTAL IRON BINDING CAPACITY: 272 UG/DL (ref 250–450)
TRANSFERRIN SERPL-MCNC: 184 MG/DL (ref 200–375)
URN SPEC COLLECT METH UR: NORMAL
WBC # BLD AUTO: 3.27 K/UL (ref 3.9–12.7)

## 2019-04-03 PROCEDURE — 80197 ASSAY OF TACROLIMUS: CPT

## 2019-04-03 PROCEDURE — 25500020 PHARM REV CODE 255: Performed by: EMERGENCY MEDICINE

## 2019-04-03 PROCEDURE — 99233 SBSQ HOSP IP/OBS HIGH 50: CPT | Mod: GC,,, | Performed by: PSYCHIATRY & NEUROLOGY

## 2019-04-03 PROCEDURE — 82962 GLUCOSE BLOOD TEST: CPT

## 2019-04-03 PROCEDURE — 99233 PR SUBSEQUENT HOSPITAL CARE,LEVL III: ICD-10-PCS | Mod: ,,, | Performed by: INTERNAL MEDICINE

## 2019-04-03 PROCEDURE — 83735 ASSAY OF MAGNESIUM: CPT

## 2019-04-03 PROCEDURE — 63600175 PHARM REV CODE 636 W HCPCS: Performed by: INTERNAL MEDICINE

## 2019-04-03 PROCEDURE — 82728 ASSAY OF FERRITIN: CPT

## 2019-04-03 PROCEDURE — 99233 SBSQ HOSP IP/OBS HIGH 50: CPT | Mod: ,,, | Performed by: INTERNAL MEDICINE

## 2019-04-03 PROCEDURE — 85025 COMPLETE CBC W/AUTO DIFF WBC: CPT

## 2019-04-03 PROCEDURE — 80053 COMPREHEN METABOLIC PANEL: CPT

## 2019-04-03 PROCEDURE — A4216 STERILE WATER/SALINE, 10 ML: HCPCS | Performed by: STUDENT IN AN ORGANIZED HEALTH CARE EDUCATION/TRAINING PROGRAM

## 2019-04-03 PROCEDURE — 81003 URINALYSIS AUTO W/O SCOPE: CPT

## 2019-04-03 PROCEDURE — 25000003 PHARM REV CODE 250: Performed by: STUDENT IN AN ORGANIZED HEALTH CARE EDUCATION/TRAINING PROGRAM

## 2019-04-03 PROCEDURE — 83540 ASSAY OF IRON: CPT

## 2019-04-03 PROCEDURE — 84100 ASSAY OF PHOSPHORUS: CPT

## 2019-04-03 PROCEDURE — 99233 PR SUBSEQUENT HOSPITAL CARE,LEVL III: ICD-10-PCS | Mod: GC,,, | Performed by: PSYCHIATRY & NEUROLOGY

## 2019-04-03 PROCEDURE — 99233 SBSQ HOSP IP/OBS HIGH 50: CPT | Mod: ,,, | Performed by: PHYSICIAN ASSISTANT

## 2019-04-03 PROCEDURE — 99233 PR SUBSEQUENT HOSPITAL CARE,LEVL III: ICD-10-PCS | Mod: ,,, | Performed by: PHYSICIAN ASSISTANT

## 2019-04-03 PROCEDURE — 11000001 HC ACUTE MED/SURG PRIVATE ROOM

## 2019-04-03 PROCEDURE — 87086 URINE CULTURE/COLONY COUNT: CPT

## 2019-04-03 PROCEDURE — 63600175 PHARM REV CODE 636 W HCPCS: Performed by: STUDENT IN AN ORGANIZED HEALTH CARE EDUCATION/TRAINING PROGRAM

## 2019-04-03 RX ADMIN — VILAZODONE HYDROCHLORIDE 40 MG: 40 TABLET ORAL at 10:04

## 2019-04-03 RX ADMIN — ATORVASTATIN CALCIUM 10 MG: 10 TABLET, FILM COATED ORAL at 08:04

## 2019-04-03 RX ADMIN — MAGNESIUM OXIDE TAB 400 MG (241.3 MG ELEMENTAL MG) 800 MG: 400 (241.3 MG) TAB at 08:04

## 2019-04-03 RX ADMIN — Medication 3 ML: at 02:04

## 2019-04-03 RX ADMIN — IOHEXOL 15 ML: 350 INJECTION, SOLUTION INTRAVENOUS at 02:04

## 2019-04-03 RX ADMIN — TACROLIMUS 1 MG: 1 CAPSULE ORAL at 05:04

## 2019-04-03 RX ADMIN — INSULIN ASPART 2 UNITS: 100 INJECTION, SOLUTION INTRAVENOUS; SUBCUTANEOUS at 10:04

## 2019-04-03 RX ADMIN — INSULIN DETEMIR 20 UNITS: 100 INJECTION, SOLUTION SUBCUTANEOUS at 10:04

## 2019-04-03 RX ADMIN — LEVOTHYROXINE SODIUM 75 MCG: 75 TABLET ORAL at 06:04

## 2019-04-03 RX ADMIN — LORAZEPAM 1 MG: 1 TABLET ORAL at 08:04

## 2019-04-03 RX ADMIN — THERA TABS 1 TABLET: TAB at 08:04

## 2019-04-03 RX ADMIN — ERTAPENEM 1 G: 1 INJECTION INTRAMUSCULAR; INTRAVENOUS at 07:04

## 2019-04-03 RX ADMIN — Medication 3 ML: at 06:04

## 2019-04-03 RX ADMIN — FAMOTIDINE 20 MG: 20 TABLET ORAL at 08:04

## 2019-04-03 RX ADMIN — TACROLIMUS 1 MG: 1 CAPSULE ORAL at 08:04

## 2019-04-03 RX ADMIN — IOHEXOL 100 ML: 350 INJECTION, SOLUTION INTRAVENOUS at 03:04

## 2019-04-03 RX ADMIN — Medication 3 ML: at 08:04

## 2019-04-03 NOTE — ASSESSMENT & PLAN NOTE
- S/p Living kidney transplant 2/2 DM/HTN on 1/14. Hx recurrent Kleb pneumo UTI (1/30, 2/11, 2/25, 3/26)  - Kidney US 4/2: collection inferior to the surgical incision measuring 14.0 x 1.2 x 3.1 cm, mildly decreased in size from prior measurement of 12.7 x 1.9 x 6.6 cm.  - Kidney fxn stable  - CT abd/pelvis to assess for infection

## 2019-04-03 NOTE — SUBJECTIVE & OBJECTIVE
Past Medical History:   Diagnosis Date    Anemia     Anemia in chronic kidney disease, on chronic dialysis 7/12/2017    Arthritis     Asthma     allergic airway    CKD stage III (moderate) 2/18/2019    Colon polyp     Depression     Diabetes mellitus     Diverticulosis     Eosinophilia     ESRD (end stage renal disease)     stage V, due for living donor 9/2018    ESRD on dialysis     GERD (gastroesophageal reflux disease)     Gout     Gout     Hyperlipidemia     Hypertension     Low back pain     MRSA carrier     Obesity     Renal disorder     Rotator cuff syndrome--left     Thyroid disease     Ulnar neuropathy of right upper extremity     Uncontrolled type 2 diabetes mellitus with hyperglycemia        Past Surgical History:   Procedure Laterality Date    ACHILLES TENDON SURGERY Left May 2015    ARTHROSCOPY, HIP Left 10/3/2013    [facility]    ARTHROSCOPY-HIP WITH TROCHANTERIC BURSECTOMY Left 10/3/2013    [facility]    ARTHROSCOPY-SHOULDER Left 11/24/2015    [facility]    ARTHROSCOPY-SHOULDER WITH SUBACROMIAL DECOMPRESSION Left 7/12/2016    [facility]    BACK SURGERY      CHOLECYSTECTOMY      COLONOSCOPY N/A 9/6/2017    [facility]    DEBRIDEMENT-SHOULDER-ARTHROSCOPIC Left 7/12/2016    [facility] OR    DEBRIDEMENT-SHOULDER-ARTHROSCOPIC; LABRAL Left 11/24/2015    [facility]    DIALYSIS FISTULA CREATION  12/2017    FEMOROPLASTY, ARTHROSCOPIC Left 10/3/2013    [facility]    HEMORRHOID SURGERY      INJECTION-AMNIOX Left 7/12/2016    [facility]    JOINT REPLACEMENT      left    KNEE SURGERY      LYSIS-ADHESION Left 11/24/2015    [facility]    REPAIR ROTATOR CUFF  ARTHROSCOPIC Left 7/12/2016    [facility]    REPAIR-TENDON-BICEP Left 11/24/2015    [facility]    SHOULDER ARTHROSCOPY      SHOULDER SURGERY      TRANSPLANT, KIDNEY N/A 1/14/2019    [facility] OR [address] [place] [address]    UPPER GASTROINTESTINAL ENDOSCOPY  ~2013    Dr. Moralez       Review of patient's allergies indicates:   Allergen Reactions    Torsemide Diarrhea     Diarrhea stopped once discontinued med    Codeine Itching     Can take oxycodone and hydrocodone with Benadryl       Medications:    (Not in a hospital admission)  Antibiotics (From admission, onward)    Start     Stop Route Frequency Ordered    04/02/19 1930  ertapenem (INVANZ) 1 g in sodium chloride 0.9 % 100 mL IVPB (ready to mix system)      -- IV Every 24 hours (non-standard times) 04/02/19 1825        Antifungals (From admission, onward)    None        Antivirals (From admission, onward)        Stop Route Frequency     INV acyclovir/placebo      -- Oral Every 12 hours     INV valganciclovir/placebo      -- Oral Daily           Immunization History   Administered Date(s) Administered    Hepatitis A / Hepatitis B 07/12/2017, 08/14/2017, 01/08/2018    Influenza 03/15/2017    PPD Test 04/13/2018    Pneumococcal Conjugate - 13 Valent 03/15/2017    Tdap 07/12/2017       Family History     Problem Relation (Age of Onset)    Alzheimer's disease Mother    COPD Maternal Aunt    Diabetes Mother, Sister, Maternal Grandmother, Maternal Aunt    Heart disease Father, Brother, Maternal Aunt    Hyperlipidemia Mother    Hypertension Maternal Grandmother, Maternal Aunt    Lupus Sister    No Known Problems Son, Paternal Grandmother, Paternal Grandfather, Maternal Uncle, Paternal Aunt, Paternal Uncle    Ovarian cancer Sister    Stroke Father    Thyroid disease Mother        Social History     Socioeconomic History    Marital status: Significant Other     Spouse name: Not on file     Number of children: 2    Years of education: Not on file    Highest education level: Not on file   Occupational History    Occupation: retired     Comment:    Social Needs    Financial resource strain: Not on file    Food insecurity:     Worry: Not on file     Inability: Not on file    Transportation needs:     Medical: Not on file     Non-medical: Not on file   Tobacco Use    Smoking status: Never Smoker    Smokeless tobacco: Never Used   Substance and Sexual Activity    Alcohol use: No    Drug use: No    Sexual activity: Never   Lifestyle    Physical activity:     Days per week: Not on file     Minutes per session: Not on file    Stress: Not on file   Relationships    Social connections:     Talks on phone: Not on file     Gets together: Not on file     Attends [employer] service: Not on file     Active member of club or organization: Not on file     Attends meetings of clubs or organizations: Not on file     Relationship status: Not on file   Other Topics Concern    Not on file   Social History Narrative    Not on file     Review of Systems   Constitutional: Positive for activity change, appetite change, chills, fatigue and fever. Negative for diaphoresis.   HENT: Negative for rhinorrhea and sore throat.    Respiratory: Negative for cough and shortness of breath.    Cardiovascular: Negative for chest pain and leg swelling.   Gastrointestinal: Positive for diarrhea. Negative for abdominal pain, nausea and vomiting.   Genitourinary: Positive for dysuria. Negative for hematuria.   Musculoskeletal: Negative for arthralgias and myalgias.   Skin: Negative for rash.   Neurological: Negative for headaches.     Objective:     Vital Signs (Most Recent):  Temp: 98.1 °F (36.7 °C) (04/02/19 1729)  Pulse: 67 (04/02/19 2202)  Resp: 16 (04/02/19 2202)  BP: 122/60 (04/02/19 2202)  SpO2: 97 % (04/02/19 2202) Vital Signs (24h Range):  Temp:  [98.1 °F (36.7 °C)-101.2 °F (38.4 °C)] 98.1 °F  (36.7 °C)  Pulse:  [62-98] 67  Resp:  [15-38] 16  SpO2:  [94 %-98 %] 97 %  BP: (108-135)/(53-73) 122/60     Weight: 98 kg (216 lb)  Body mass index is 33.83 kg/m².    Estimated Creatinine Clearance: 59.2 mL/min (based on SCr of 1.2 mg/dL).    Physical Exam   Constitutional: She is oriented to person, place, and time. She appears well-developed and well-nourished. No distress.   HENT:   Head: Normocephalic and atraumatic.   Eyes: Conjunctivae and EOM are normal.   Neck: Normal range of motion. Neck supple.   Cardiovascular: Normal rate, regular rhythm and normal heart sounds.   Pulmonary/Chest: Effort normal and breath sounds normal. No respiratory distress. She has no wheezes. She has no rales.   Abdominal: Soft. She exhibits no distension. There is no tenderness. There is no guarding.   Musculoskeletal: Normal range of motion. She exhibits no edema.   Neurological: She is alert and oriented to person, place, and time.   Skin: Skin is warm and dry. No rash noted. She is not diaphoretic. No erythema.   Psychiatric: She has a normal mood and affect. Her behavior is normal.       Significant Labs: All pertinent labs within the past 24 hours have been reviewed.    Significant Imaging: I have reviewed all pertinent imaging results/findings within the past 24 hours.

## 2019-04-03 NOTE — SUBJECTIVE & OBJECTIVE
Subjective:   History of Present Illness:  Patient is a 59 y.o. female female who received a living kidney transplant on 1/14/19. Post operative course has been complicated by recurrent UTI with LAM friedman, currently treating with 10 day course of Cipro, completed 4 days. She reports fatigue over the past 2 or so weeks, often staying in bed through the morning and taking naps in the afternoon. She began having diarrhea over the past month or so also, this has been worked up and was negative for C dif on 3/6 and 3/13. She presented to the ED 4/1 due to fevers which steadily increased to a tmax of 101.2, she denies recent fevers prior to this, even with previous UTI. Reports mild nausea without emesis. She denies dark urine, edema, abdominal pain, or decreased UOP.  Admit to [facility] for further work-up.    Hospital Course:  Admitted for fevers and neutropenia, reports having chills x 1 day. Viral PCR pending. CMV PCR 4/2 pending. A dose of zosyn in ED on admit, transitioned to Cefepime 4/2 for ongoing UTI. ID consulted, with recs to start Ertapenem. Kidney US 4/2 showed a fluid collection inferior to the surgical incision measuring 14.0 x 1.2 x 3.1 cm, mildly decreased in size from prior measurement of 12.7 x 1.9 x 6.6 cm. Renal fxn stable. Will obtain CT abd/pelvis to look for infection. C dif pending for ongoing diarrhea. VSS. Neuro consulted for reports of leg weakness. Appreciate recs. Cont to monitor         Ms. Newell is a 59 y.o. year old female who is status post Kidney Transplant - 1/14/2019  (#1).    Her maintenance immunosuppression consists of:   Immunosuppressants (From admission, onward)    Start     Stop Route Frequency Ordered    04/02/19 0800  tacrolimus capsule 1 mg      -- Oral 2 times daily 04/02/19 0149          Past Medical, Surgical, Family, and Social History:   Unchanged from H&P.    Scheduled Meds:   atorvastatin  40 mg Oral Daily    atovaquone  1,500 mg Oral Daily    ergocalciferol  50,000  Units Oral Q7 Days    ertapenem (INVANZ) IVPB  1 g Intravenous Q24H    famotidine  20 mg Oral BID    insulin detemir U-100  20 Units Subcutaneous QHS    INV acyclovir/placebo  400 mg Oral Q12H    INV [id number]/placebo  480 mg Oral Daily    INV valganciclovir/placebo  900 mg Oral Daily    levothyroxine  75 mcg Oral Before breakfast    LORazepam  1 mg Oral BID    magnesium oxide  800 mg Oral Daily    multivitamin  1 tablet Oral Daily    sodium chloride 0.9%  3 mL Intravenous Q8H    tacrolimus  1 mg Oral BID    vilazodone  40 mg Oral Daily     Continuous Infusions:  PRN Meds:acetaminophen, dextrose 50%, dextrose 50%, glucagon (human recombinant), glucose, glucose, insulin aspart U-100, ondansetron    Intake/Output - Last 3 Shifts       04/01 0700 - 04/02 0659 04/02 0700 - 04/03 0659 04/03 0700 - 04/04 0659    I.V. (mL/kg)  53 (0.5)     IV Piggyback 1100      Total Intake(mL/kg) 1100 (11.2) 53 (0.5)     Net +1100 +53                   Review of Systems   Constitutional: Positive for activity change, appetite change and fatigue. Negative for chills and fever.   Respiratory: Negative for cough and shortness of breath.    Cardiovascular: Negative for chest pain and leg swelling.   Gastrointestinal: Positive for abdominal pain. Negative for abdominal distention, constipation, diarrhea, nausea and vomiting.   Genitourinary: Negative for decreased urine volume, difficulty urinating and dysuria.   Allergic/Immunologic: Positive for immunocompromised state.   Neurological: Positive for weakness (LE). Negative for headaches.   Psychiatric/Behavioral: Negative for agitation and behavioral problems.      Objective:     Vital Signs (Most Recent):  Temp: 98.2 °F (36.8 °C) (04/03/19 1355)  Pulse: 71 (04/03/19 1355)  Resp: 13 (04/03/19 1355)  BP: 128/60 (04/03/19 1300)  SpO2: 96 % (04/03/19 1355) Vital Signs (24h Range):  Temp:  [98.1 °F (36.7 °C)-98.7 °F (37.1 °C)] 98.2 °F (36.8 °C)  Pulse:  [60-98] 71  Resp:  [13-20]  "13  SpO2:  [93 %-99 %] 96 %  BP: (118-189)/(56-77) 128/60     Weight: 98 kg (216 lb)  Height: 5' 7" (170.2 cm)  Body mass index is 33.83 kg/m².    Physical Exam    Laboratory:  CBC:   Recent Labs   Lab 04/02/19  0552 04/02/19  1443 04/03/19  0359   WBC 1.70* 2.64* 3.27*   RBC 4.32 2.37* 2.58*   HGB 13.1 7.3* 7.9*   HCT 40.6 22.2* 24.0*   * 155 186   MCV 94 94 93   MCH 30.3 30.8 30.6   MCHC 32.3 32.9 32.9     CMP:   Recent Labs   Lab 04/01/19 2207 04/02/19  0552 04/03/19  0359   * 189* 163*   CALCIUM 9.5 8.8 8.8   ALBUMIN 3.8 3.3* 3.3*   PROT 6.7 6.1 6.4    138 139   K 4.6 4.2 4.6   CO2 26 26 21*    106 107   BUN 14 14 13   CREATININE 1.3 1.2 1.0   ALKPHOS 156* 139* 128   ALT 30 27 31   AST 24 23 35     Labs within the past 24 hours have been reviewed.    Diagnostic Results:  reviewed  "

## 2019-04-03 NOTE — CONSULTS
[facility]  Infectious Disease  Consult Note    Patient Name: Andra Newell  MRN: [id number]  Admission Date: 4/2/2019  Hospital Length of Stay: 0 days  Attending Physician: Destin Sanchez MD  Primary Care Provider: Gita Cohen MD     Isolation Status: Special Contact    Patient information was obtained from patient, past medical records and ER records.      Consults  Assessment/Plan:     Fever  59-year-old female with history of DM/HTN/antibiotic induced nephropathy c/b ESRD living kidney transplant (friend) on 1/14/2019 with campath induction, now on MMR, tacro for maintenance, CMV D+/R- in letermivir/acyclovir vs. valganciclovir CMV Prevention trial, recurrent UTIs with ESBL Klebsiella, presents with fevers and fatigue.     Recommendations:  - Start ertapenem 1 g IV qdaily  - Follow-up urine and blood cultures  - Follow-up CMV PCR        Thank you for your consult. I will follow-up with patient. Please contact us if you have any additional questions.    Cammie Kessler MD  Infectious Disease  [facility]    Subjective:     Principal Problem: <principal problem not specified>    HPI: 59-year-old female with history of DM/HTN/antibiotic induced nephropathy c/b ESRD living kidney transplant (friend) on 1/14/2019 with campath induction, now on MMR, tacro for maintenance, CMV D+/R- in letermivir/acyclovir vs. valganciclovir CMV Prevention trial, presents with fevers and fatigue.  Patient's post-operative course has been complicated by recurrent UTIs with ESBL Klebsiella, treated with multiple courses of ciprofloxacin.  Patient recently with dysuria and urinary hesitancy - urine cultures with pansensitive K. pneumo - she was started on cipro.  Over the weekend, patient with fevers and fatigue, presented to ED for further evaluation.  Patient also reports diarrhea for the last 2 weeks - more than 4 episodes per day, things related to magnesium repletion.    Past Medical  History:   Diagnosis Date    Anemia     Anemia in chronic kidney disease, on chronic dialysis 7/12/2017    Arthritis     Asthma     allergic airway    CKD stage III (moderate) 2/18/2019    Colon polyp     Depression     Diabetes mellitus     Diverticulosis     Eosinophilia     ESRD (end stage renal disease)     stage V, due for living donor 9/2018    ESRD on dialysis     GERD (gastroesophageal reflux disease)     Gout     Gout     Hyperlipidemia     Hypertension     Low back pain     MRSA carrier     Obesity     Renal disorder     Rotator cuff syndrome--left     Thyroid disease     Ulnar neuropathy of right upper extremity     Uncontrolled type 2 diabetes mellitus with hyperglycemia        Past Surgical History:   Procedure Laterality Date    ACHILLES TENDON SURGERY Left May 2015    ARTHROSCOPY, HIP Left 10/3/2013    [facility]    ARTHROSCOPY-HIP WITH TROCHANTERIC BURSECTOMY Left 10/3/2013    [facility]    ARTHROSCOPY-SHOULDER Left 11/24/2015    [facility]    ARTHROSCOPY-SHOULDER WITH SUBACROMIAL DECOMPRESSION Left 7/12/2016    [facility] at [facility] OR    BACK SURGERY      CHOLECYSTECTOMY      COLONOSCOPY N/A 9/6/2017    [facility]    DEBRIDEMENT-SHOULDER-ARTHROSCOPIC Left 7/12/2016    [facility]    DEBRIDEMENT-SHOULDER-ARTHROSCOPIC; LABRAL Left 11/24/2015    [facility]    DIALYSIS FISTULA CREATION  12/2017    FEMOROPLASTY, ARTHROSCOPIC Left 10/3/2013    [facility]    HEMORRHOID SURGERY      INJECTION-AMNIOX Left 7/12/2016    [facility]    JOINT REPLACEMENT      left    KNEE SURGERY      LYSIS-ADHESION Left 11/24/2015    [facility]    REPAIR ROTATOR CUFF ARTHROSCOPIC  Left 7/12/2016    [facility]    REPAIR-TENDON-BICEP Left 11/24/2015    [facility]    SHOULDER ARTHROSCOPY      SHOULDER SURGERY      TRANSPLANT, KIDNEY N/A 1/14/2019    Per[facility]    UPPER GASTROINTESTINAL ENDOSCOPY  ~2013    Dr. Moralez       Review of patient's allergies indicates:   Allergen Reactions    Torsemide Diarrhea     Diarrhea stopped once discontinued med    Codeine Itching     Can take oxycodone and hydrocodone with Benadryl       Medications:    (Not in a hospital admission)  Antibiotics (From admission, onward)    Start     Stop Route Frequency Ordered    04/02/19 1930  ertapenem (INVANZ) 1 g in sodium chloride 0.9 % 100 mL IVPB (ready to mix system)      -- IV Every 24 hours (non-standard times) 04/02/19 1825        Antifungals (From admission, onward)    None        Antivirals (From admission, onward)        Stop Route Frequency     INV acyclovir/placebo      -- Oral Every 12 hours     INV valganciclovir/placebo      -- Oral Daily           Immunization History   Administered Date(s) Administered    Hepatitis A / Hepatitis B 07/12/2017, 08/14/2017, 01/08/2018    Influenza 03/15/2017    PPD Test 04/13/2018    Pneumococcal Conjugate - 13 Valent 03/15/2017    Tdap 07/12/2017       Family History     Problem Relation (Age of Onset)    Alzheimer's disease Mother    COPD Maternal Aunt    Diabetes Mother, Sister, Maternal Grandmother, Maternal Aunt    Heart disease Father, Brother, Maternal Aunt    Hyperlipidemia Mother    Hypertension Maternal Grandmother, Maternal Aunt    Lupus Sister    No Known Problems Son, Paternal Grandmother, Paternal Grandfather, Maternal Uncle, Paternal Aunt, Paternal Uncle    Ovarian cancer Sister    Stroke Father    Thyroid disease Mother        Social History     Socioeconomic History    Marital status: Significant Other     Spouse name: Not on file    Number of  children: 2    Years of education: Not on file    Highest education level: Not on file   Occupational History    Occupation: retired     Comment:    Social Needs    Financial resource strain: Not on file    Food insecurity:     Worry: Not on file     Inability: Not on file    Transportation needs:     Medical: Not on file     Non-medical: Not on file   Tobacco Use    Smoking status: Never Smoker    Smokeless tobacco: Never Used   Substance and Sexual Activity    Alcohol use: No    Drug use: No    Sexual activity: Never   Lifestyle    Physical activity:     Days per week: Not on file     Minutes per session: Not on file    Stress: Not on file   Relationships    Social connections:     Talks on phone: Not on file     Gets together: Not on file     Attends Protestant service: Not on file     Active member of club or organization: Not on file     Attends meetings of clubs or organizations: Not on file     Relationship status: Not on file   Other Topics Concern    Not on file   Social History Narrative    Not on file     Review of Systems   Constitutional: Positive for activity change, appetite change, chills, fatigue and fever. Negative for diaphoresis.   HENT: Negative for rhinorrhea and sore throat.    Respiratory: Negative for cough and shortness of breath.    Cardiovascular: Negative for chest pain and leg swelling.   Gastrointestinal: Positive for diarrhea. Negative for abdominal pain, nausea and vomiting.   Genitourinary: Positive for dysuria. Negative for hematuria.   Musculoskeletal: Negative for arthralgias and myalgias.   Skin: Negative for rash.   Neurological: Negative for headaches.     Objective:     Vital Signs (Most Recent):  Temp: 98.1 °F (36.7 °C) (04/02/19 1729)  Pulse: 67 (04/02/19 2202)  Resp: 16 (04/02/19 2202)  BP: 122/60 (04/02/19 2202)  SpO2: 97 % (04/02/19 2202) Vital Signs (24h Range):  Temp:  [98.1 °F (36.7 °C)-101.2 °F (38.4 °C)] 98.1 °F (36.7 °C)  Pulse:   [62-98] 67  Resp:  [15-38] 16  SpO2:  [94 %-98 %] 97 %  BP: (108-135)/(53-73) 122/60     Weight: 98 kg (216 lb)  Body mass index is 33.83 kg/m².    Estimated Creatinine Clearance: 59.2 mL/min (based on SCr of 1.2 mg/dL).    Physical Exam   Constitutional: She is oriented to person, place, and time. She appears well-developed and well-nourished. No distress.   HENT:   Head: Normocephalic and atraumatic.   Eyes: Conjunctivae and EOM are normal.   Neck: Normal range of motion. Neck supple.   Cardiovascular: Normal rate, regular rhythm and normal heart sounds.   Pulmonary/Chest: Effort normal and breath sounds normal. No respiratory distress. She has no wheezes. She has no rales.   Abdominal: Soft. She exhibits no distension. There is no tenderness. There is no guarding.   Musculoskeletal: Normal range of motion. She exhibits no edema.   Neurological: She is alert and oriented to person, place, and time.   Skin: Skin is warm and dry. No rash noted. She is not diaphoretic. No erythema.   Psychiatric: She has a normal mood and affect. Her behavior is normal.       Significant Labs: All pertinent labs within the past 24 hours have been reviewed.    Significant Imaging: I have reviewed all pertinent imaging results/findings within the past 24 hours.

## 2019-04-03 NOTE — CONSULTS
[facility]  Neurology  Consult Note    Patient Name: Andra Newell  MRN: [id number]  Admission Date: 4/2/2019  Hospital Length of Stay: 1 days  Code Status: Full Code   Attending Provider: Destin Sanchez MD   Consulting Provider: Julissa Andrews MD  Primary Care Physician: Gita Cohen MD  Principal Problem:Fever    Inpatient consult to Neurology  Consult performed by: Julissa Andrews MD  Consult ordered by: Yonny Mtz MD         Subjective:     Chief Complaint:  Leg weakness     HPI:   Mrs. Newell is a 59 year old woman with history of kidney transplant on 1/14/19, DM, HTN, HLD who is admitted for management of neutropenic fevers. Her postoperative course was complicated by recurrent UTIs, diarrhea and fever. Kidney US on 4/2 showed a fluid collection inferior to her incision (14.0 x 1.2 x 3.1 cm).     General neurology is consulted for leg weakness. She reports that since her transplant, she has experienced leg weakness with exertion and associated shortness of breath. She describes her legs as feeling like jello, and has been unable to use stairs because of this. Her symptoms do not fluctuate during the day, and are only exacerbated with exertion. She has chronic shoulder issues which limit her ability to lift above her head, but denies any new arm weakness. She reports numbness in her legs but denies any significant leg pain. She has a remote history of 3 lumbar spine surgeries (last ~10 years ago) but is unsure of the exact procedures or levels that were operated on. She has urinary incontinence since surgery, denies bowel incontinence. She denies any double vision or trouble swallowing.     Past Medical History:   Diagnosis Date    Anemia     Anemia in chronic kidney disease, on chronic dialysis 7/12/2017    Arthritis     Asthma     allergic airway    CKD stage III (moderate) 2/18/2019    Colon polyp     Depression     Diabetes mellitus     Diverticulosis      Eosinophilia     ESRD (end stage renal disease)     stage V, due for living donor 9/2018    ESRD on dialysis     GERD (gastroesophageal reflux disease)     Gout     Gout     Hyperlipidemia     Hypertension     Low back pain     MRSA carrier     Obesity     Renal disorder     Rotator cuff syndrome--left     Thyroid disease     Ulnar neuropathy of right upper extremity     Uncontrolled type 2 diabetes mellitus with hyperglycemia        Past Surgical History:   Procedure Laterality Date    ACHILLES TENDON SURGERY Left May 2015    ARTHROSCOPY, HIP Left 10/3/2013    [facility] OR    ARTHROSCOPY-HIP WITH TROCHANTERIC BURSECTOMY Left 10/3/2013    [facility]    ARTHROSCOPY-SHOULDER Left 11/24/2015    [facility]    ARTHROSCOPY-SHOULDER WITH SUBACROMIAL DECOMPRESSION Left 7/12/2016    [facility]    BACK SURGERY      CHOLECYSTECTOMY      COLONOSCOPY N/A 9/6/2017    [facility]    DEBRIDEMENT-SHOULDER-ARTHROSCOPIC Left 7/12/2016    [facility]    DEBRIDEMENT-SHOULDER-ARTHROSCOPIC; LABRAL Left 11/24/2015    [facility]    DIALYSIS FISTULA CREATION  12/2017    FEMOROPLASTY, ARTHROSCOPIC Left 10/3/2013    [facility]    HEMORRHOID SURGERY      INJECTION-AMNIOX Left 7/12/2016    [facility]    JOINT REPLACEMENT      left    KNEE SURGERY      LYSIS-ADHESION Left 11/24/2015    [facility]    REPAIR ROTATOR CUFF ARTHROSCOPIC Left 7/12/2016    [facility] at [facility]    REPAIR-TENDON-BICEP Left 11/24/2015    [facility]    SHOULDER ARTHROSCOPY      SHOULDER SURGERY      TRANSPLANT, KIDNEY N/A 1/14/2019    Performed by [facility]  at [facility] OR [address] [place] [address]    UPPER GASTROINTESTINAL ENDOSCOPY  ~2013    Dr. Weineror       Review of patient's allergies indicates:   Allergen Reactions    Torsemide Diarrhea     Diarrhea stopped once discontinued med    Codeine Itching     Can take oxycodone and hydrocodone with Benadryl       Current Neurological Medications:     No current facility-administered medications on file prior to encounter.      Current Outpatient Medications on File Prior to Encounter   Medication Sig    atovaquone (MEPRON) 750 mg/5 mL Susp Take 10 mLs (1,500 mg total) by mouth once daily. Stop: 1/14/20    ciprofloxacin HCl (CIPRO) 500 MG tablet Take 1 tablet (500 mg total) by mouth every 12 (twelve) hours. for 10 days    ergocalciferol (ERGOCALCIFEROL) 50,000 unit Cap Take 1 capsule (50,000 Units total) by mouth every 7 days. Take on Tues    estradiol (ESTRACE) 0.01 % (0.1 mg/gram) vaginal cream Place 0.5 g vaginally twice a week. Apply to the vagina daily for two weeks then twice a week.    famotidine (PEPCID) 20 MG tablet Take 1 tablet (20 mg total) by mouth 2 (two) times daily.    flash glucose scanning reader (FREESTYLE DAKOTAH 14 DAY READER) Misc Dispense 1 reader    flash glucose sensor (FREESTYLE DAKOTAH 14 DAY SENSOR) Kit Uses 1 kit monthly    insulin (BASAGLAR KWIKPEN U-100 INSULIN) glargine 100 units/mL (3mL) SubQ pen Inject 30 Units into the skin every evening.    insulin aspart U-100 (NOVOLOG FLEXPEN U-100 INSULIN) 100 unit/mL InPn pen Novolog 6 u AC + correction Max TDD 40u    INV acyclovir/placebo capsule Take 1 capsule (400 mg total) by mouth every 12 (twelve) hours. For investigational use only. Study ID# [id number]. Protocol: MK 8228-002    INV MK-8228/placebo 480 mg oral tablet Take 1 tablet (480 mg total) by mouth once daily. FOR INVESTIGATIONAL USE ONLY. Patient Study# [id number]. Protocol: MK 8228-002    INV valganciclovir/placebo tablet Take 2 tablets (900 mg total) by mouth once daily.  FOR INVESTIGATIONAL  "USE ONLY. Study ID# [id number] Protocol MK 8228-002    levothyroxine (SYNTHROID) 75 MCG tablet TAKE ONE TABLET BY MOUTH ONCE DAILY    LORazepam (ATIVAN) 2 MG Tab Take 1 tablet (2 mg total) by mouth 2 (two) times daily. (Patient taking differently: Take 1 mg by mouth 2 (two) times daily. )    magnesium oxide (MAG-OX) 400 mg (241.3 mg magnesium) tablet Take 2 tablets (800 mg total) by mouth once daily.    multivitamin (THERAGRAN) tablet Take 1 tablet by mouth once daily.    pen needle, diabetic (BD ULTRA-FINE MINI PEN NEEDLE) 31 gauge x 3/16" Ndle USES 4  DAILY    tacrolimus (PROGRAF) 0.5 MG Cap Take 2 capsules (1 mg total) by mouth every morning AND 2 capsules (1 mg total) every evening. Z94.0 kidney transplant. (Patient taking differently: 1 capsules 2 times a day)    VIIBRYD 40 mg Tab tablet TAKE ONE TABLET BY MOUTH ONCE DAILY     Family History     Problem Relation (Age of Onset)    Alzheimer's disease Mother    COPD Maternal Aunt    Diabetes Mother, Sister, Maternal Grandmother, Maternal Aunt    Heart disease Father, Brother, Maternal Aunt    Hyperlipidemia Mother    Hypertension Maternal Grandmother, Maternal Aunt    Lupus Sister    No Known Problems Son, Paternal Grandmother, Paternal Grandfather, Maternal Uncle, Paternal Aunt, Paternal Uncle    Ovarian cancer Sister    Stroke Father    Thyroid disease Mother        Tobacco Use    Smoking status: Never Smoker    Smokeless tobacco: Never Used   Substance and Sexual Activity    Alcohol use: No    Drug use: No    Sexual activity: Never     Review of Systems   Constitutional: Negative for activity change, chills and fever.   HENT: Negative for sinus pressure and trouble swallowing.    Eyes: Negative for pain and visual disturbance.   Respiratory: Negative for cough, chest tightness and shortness of breath.    Cardiovascular: Negative for chest pain and palpitations.   Gastrointestinal: Negative for abdominal pain, diarrhea, nausea and vomiting. "   Genitourinary: Negative for difficulty urinating and dysuria.   Musculoskeletal: Negative for back pain and neck pain.   Skin: Negative for rash and wound.   Neurological: Positive for weakness. Negative for numbness and headaches.   Psychiatric/Behavioral: Negative for agitation and confusion.     Objective:     Vital Signs (Most Recent):  Temp: 98.2 °F (36.8 °C) (04/03/19 1642)  Pulse: 71 (04/03/19 1355)  Resp: 13 (04/03/19 1355)  BP: (!) 114/55 (04/03/19 1332)  SpO2: 96 % (04/03/19 1355) Vital Signs (24h Range):  Temp:  [98.1 °F (36.7 °C)-98.7 °F (37.1 °C)] 98.2 °F (36.8 °C)  Pulse:  [60-98] 71  Resp:  [13-20] 13  SpO2:  [93 %-99 %] 96 %  BP: (114-189)/(55-77) 114/55     Weight: 98 kg (216 lb)  Body mass index is 33.83 kg/m².    Physical Exam   Constitutional: She appears well-developed and well-nourished.   HENT:   Head: Normocephalic and atraumatic.   Eyes: Pupils are equal, round, and reactive to light. EOM are normal.   Cardiovascular: Normal rate.   Pulmonary/Chest: Effort normal. No respiratory distress.   Neurological:   Reflex Scores:       Bicep reflexes are 1+ on the right side and 1+ on the left side.       Brachioradialis reflexes are 1+ on the right side and 1+ on the left side.       Patellar reflexes are 1+ on the right side and 0 (prior replacement) on the left side.       Achilles reflexes are 1+ on the right side and 1+ on the left side.  Skin: Skin is warm and dry.   Psychiatric: She has a normal mood and affect. Her speech is normal and behavior is normal.   Nursing note and vitals reviewed.      NEUROLOGICAL EXAMINATION:     MENTAL STATUS   Speech: speech is normal   Level of consciousness: alert    CRANIAL NERVES     CN III, IV, VI   Pupils are equal, round, and reactive to light.  Extraocular motions are normal.     CN V   Facial sensation intact.     CN VII   Facial expression full, symmetric.     CN IX, X   CN IX normal.   CN X normal.     CN XI   CN XI normal.     CN XII   CN XII  normal.     MOTOR EXAM   Muscle bulk: normal  Overall muscle tone: normal    Strength   Strength 5/5 except as noted.   Right iliopsoas: 3/5  Left iliopsoas: 4/5       Right hip flexion limited by pain     REFLEXES     Reflexes   Right brachioradialis: 1+  Left brachioradialis: 1+  Right biceps: 1+  Left biceps: 1+  Right patellar: 1+  Left patellar: 0 (prior replacement)  Right achilles: 1+  Left achilles: 1+  Right : 1+  Left : 1+  Right plantar: normal  Left plantar: normal    SENSORY EXAM   Light touch normal.       Significant Labs:   CBC:   Recent Labs   Lab 04/02/19  0552 04/02/19  1443 04/03/19  0359   WBC 1.70* 2.64* 3.27*   HGB 13.1 7.3* 7.9*   HCT 40.6 22.2* 24.0*   * 155 186     CMP:   Recent Labs   Lab 04/01/19  2207 04/02/19  0052 04/02/19  0552 04/03/19  0359   *  --  189* 163*     --  138 139   K 4.6  --  4.2 4.6     --  106 107   CO2 26  --  26 21*   BUN 14  --  14 13   CREATININE 1.3  --  1.2 1.0   CALCIUM 9.5  --  8.8 8.8   MG  --  1.3* 1.4* 1.9   PROT 6.7  --  6.1 6.4   ALBUMIN 3.8  --  3.3* 3.3*   BILITOT 0.4  --  0.4 0.4   ALKPHOS 156*  --  139* 128   AST 24  --  23 35   ALT 30  --  27 31   ANIONGAP 10  --  6* 11   EGFRNONAA 44*  --  48.9* >60.0       Significant Imaging: I have reviewed all pertinent imaging results/findings within the past 24 hours.    Assessment and Plan:     Leg weakness  Bilateral leg weakness with exertion since transplant. With her history of multiple spinal surgeries, there is likely a component of neurogenic claudication. Other differentials include a diabetic lumbosacral plexopathy, or compressive plexopathy due to the fluid collection.     Recommendations:  -- MRI lumbar spine without contrast  -- Physical therapy evaluation    Kidney transplant recipient  -- US with fluid collection below surgical incision  -- management per primary      VTE Risk Mitigation (From admission, onward)        Ordered     Place EDUARDO hose  Until  discontinued      04/02/19 0149     Place sequential compression device  Until discontinued      04/02/19 0149     IP VTE HIGH RISK PATIENT  Once      04/02/19 0149          Thank you for your consult. I will follow-up with patient. Please contact us if you have any additional questions.       Julissa Andrews MD  Neurology  [facility]

## 2019-04-03 NOTE — SUBJECTIVE & OBJECTIVE
Past Medical History:   Diagnosis Date    Anemia     Anemia in chronic kidney disease, on chronic dialysis 7/12/2017    Arthritis     Asthma     allergic airway    CKD stage III (moderate) 2/18/2019    Colon polyp     Depression     Diabetes mellitus     Diverticulosis     Eosinophilia     ESRD (end stage renal disease)     stage V, due for living donor 9/2018    ESRD on dialysis     GERD (gastroesophageal reflux disease)     Gout     Gout     Hyperlipidemia     Hypertension     Low back pain     MRSA carrier     Obesity     Renal disorder     Rotator cuff syndrome--left     Thyroid disease     Ulnar neuropathy of right upper extremity     Uncontrolled type 2 diabetes mellitus with hyperglycemia        Past Surgical History:   Procedure Laterality Date    ACHILLES TENDON SURGERY Left May 2015    ARTHROSCOPY, HIP Left 10/3/2013    [facility]    ARTHROSCOPY-HIP WITH TROCHANTERIC BURSECTOMY Left 10/3/2013    [facility]    ARTHROSCOPY-SHOULDER Left 11/24/2015    [facility]    ARTHROSCOPY-SHOULDER WITH SUBACROMIAL DECOMPRESSION Left 7/12/2016    [facility]    BACK SURGERY      CHOLECYSTECTOMY      COLONOSCOPY N/A 9/6/2017    [facility]    DEBRIDEMENT-SHOULDER-ARTHROSCOPIC Left 7/12/2016    [facility] OR    DEBRIDEMENT-SHOULDER-ARTHROSCOPIC; LABRAL Left 11/24/2015    [facility] OR    DIALYSIS FISTULA CREATION  12/2017    FEMOROPLASTY, ARTHROSCOPIC Left 10/3/2013    [facility]    HEMORRHOID SURGERY      INJECTION-AMNIOX Left 7/12/2016    [facility]    JOINT REPLACEMENT      left    KNEE SURGERY      LYSIS-ADHESION Left 11/24/2015    [facility]    REPAIR ROTATOR CUFF  ARTHROSCOPIC Left 7/12/2016    [facility]    REPAIR-TENDON-BICEP Left 11/24/2015    [facility] at [facility] OR    SHOULDER ARTHROSCOPY      SHOULDER SURGERY      TRANSPLANT, KIDNEY N/A 1/14/2019    Performed by [facility] at [facility] OR [address] [place] [address]    UPPER GASTROINTESTINAL ENDOSCOPY  ~2013     [facility]       Review of patient's allergies indicates:   Allergen Reactions    Torsemide Diarrhea     Diarrhea stopped once discontinued med    Codeine Itching     Can take oxycodone and hydrocodone with Benadryl       Current Neurological Medications:     No current facility-administered medications on file prior to encounter.      Current Outpatient Medications on File Prior to Encounter   Medication Sig    atovaquone (MEPRON) 750 mg/5 mL Susp Take 10 mLs (1,500 mg total) by mouth once daily. Stop: 1/14/20    ciprofloxacin HCl (CIPRO) 500 MG tablet Take 1 tablet (500 mg total) by mouth every 12 (twelve) hours. for 10 days    ergocalciferol (ERGOCALCIFEROL) 50,000 unit Cap Take 1 capsule (50,000 Units total) by mouth every 7 days. Take on Tues    estradiol (ESTRACE) 0.01 % (0.1 mg/gram) vaginal cream Place 0.5 g vaginally twice a week. Apply to the vagina daily for two weeks then twice a week.    famotidine (PEPCID) 20 MG tablet Take 1 tablet (20 mg total) by mouth 2 (two) times daily.    flash glucose scanning reader (FREESTYLE DAKOTAH 14 DAY READER) Misc Dispense 1 reader    flash glucose sensor (FREESTYLE DAKOTAH 14 DAY SENSOR) Kit Uses 1 kit monthly    insulin (BASAGLAR KWIKPEN U-100 INSULIN) glargine 100 units/mL (3mL) SubQ pen Inject 30 Units into the skin every evening.    insulin aspart U-100 (NOVOLOG FLEXPEN U-100 INSULIN) 100 unit/mL InPn pen Novolog 6 u AC + correction Max TDD 40u    INV acyclovir/placebo capsule Take 1 capsule (400 mg total) by mouth every 12 (twelve) hours. For investigational use only. Study ID# [id number]. Protocol: MK 8228-002     "INV MK-8228/placebo 480 mg oral tablet Take 1 tablet (480 mg total) by mouth once daily. FOR INVESTIGATIONAL USE ONLY. Patient Study# [id number]. Protocol: MK 8228-002    INV valganciclovir/placebo tablet Take 2 tablets (900 mg total) by mouth once daily.  FOR INVESTIGATIONAL USE ONLY. Study ID# [id number] Protocol MK 8228-002    levothyroxine (SYNTHROID) 75 MCG tablet TAKE ONE TABLET BY MOUTH ONCE DAILY    LORazepam (ATIVAN) 2 MG Tab Take 1 tablet (2 mg total) by mouth 2 (two) times daily. (Patient taking differently: Take 1 mg by mouth 2 (two) times daily. )    magnesium oxide (MAG-OX) 400 mg (241.3 mg magnesium) tablet Take 2 tablets (800 mg total) by mouth once daily.    multivitamin (THERAGRAN) tablet Take 1 tablet by mouth once daily.    pen needle, diabetic (BD ULTRA-FINE MINI PEN NEEDLE) 31 gauge x 3/16" Ndle USES 4  DAILY    tacrolimus (PROGRAF) 0.5 MG Cap Take 2 capsules (1 mg total) by mouth every morning AND 2 capsules (1 mg total) every evening. Z94.0 kidney transplant. (Patient taking differently: 1 capsules 2 times a day)    VIIBRYD 40 mg Tab tablet TAKE ONE TABLET BY MOUTH ONCE DAILY     Family History     Problem Relation (Age of Onset)    Alzheimer's disease Mother    COPD Maternal Aunt    Diabetes Mother, Sister, Maternal Grandmother, Maternal Aunt    Heart disease Father, Brother, Maternal Aunt    Hyperlipidemia Mother    Hypertension Maternal Grandmother, Maternal Aunt    Lupus Sister    No Known Problems Son, Paternal Grandmother, Paternal Grandfather, Maternal Uncle, Paternal Aunt, Paternal Uncle    Ovarian cancer Sister    Stroke Father    Thyroid disease Mother        Tobacco Use    Smoking status: Never Smoker    Smokeless tobacco: Never Used   Substance and Sexual Activity    Alcohol use: No    Drug use: No    Sexual activity: Never     Review of Systems   Constitutional: Negative for activity change, chills and fever.   HENT: Negative for sinus pressure and trouble " swallowing.    Eyes: Negative for pain and visual disturbance.   Respiratory: Negative for cough, chest tightness and shortness of breath.    Cardiovascular: Negative for chest pain and palpitations.   Gastrointestinal: Negative for abdominal pain, diarrhea, nausea and vomiting.   Genitourinary: Negative for difficulty urinating and dysuria.   Musculoskeletal: Negative for back pain and neck pain.   Skin: Negative for rash and wound.   Neurological: Positive for weakness. Negative for numbness and headaches.   Psychiatric/Behavioral: Negative for agitation and confusion.     Objective:     Vital Signs (Most Recent):  Temp: 98.2 °F (36.8 °C) (04/03/19 1642)  Pulse: 71 (04/03/19 1355)  Resp: 13 (04/03/19 1355)  BP: (!) 114/55 (04/03/19 1332)  SpO2: 96 % (04/03/19 1355) Vital Signs (24h Range):  Temp:  [98.1 °F (36.7 °C)-98.7 °F (37.1 °C)] 98.2 °F (36.8 °C)  Pulse:  [60-98] 71  Resp:  [13-20] 13  SpO2:  [93 %-99 %] 96 %  BP: (114-189)/(55-77) 114/55     Weight: 98 kg (216 lb)  Body mass index is 33.83 kg/m².    Physical Exam   Constitutional: She appears well-developed and well-nourished.   HENT:   Head: Normocephalic and atraumatic.   Eyes: Pupils are equal, round, and reactive to light. EOM are normal.   Cardiovascular: Normal rate.   Pulmonary/Chest: Effort normal. No respiratory distress.   Neurological:   Reflex Scores:       Bicep reflexes are 1+ on the right side and 1+ on the left side.       Brachioradialis reflexes are 1+ on the right side and 1+ on the left side.       Patellar reflexes are 1+ on the right side and 0 (prior replacement) on the left side.       Achilles reflexes are 1+ on the right side and 1+ on the left side.  Skin: Skin is warm and dry.   Psychiatric: She has a normal mood and affect. Her speech is normal and behavior is normal.   Nursing note and vitals reviewed.      NEUROLOGICAL EXAMINATION:     MENTAL STATUS   Speech: speech is normal   Level of consciousness: alert    CRANIAL NERVES      CN III, IV, VI   Pupils are equal, round, and reactive to light.  Extraocular motions are normal.     CN V   Facial sensation intact.     CN VII   Facial expression full, symmetric.     CN IX, X   CN IX normal.   CN X normal.     CN XI   CN XI normal.     CN XII   CN XII normal.     MOTOR EXAM   Muscle bulk: normal  Overall muscle tone: normal    Strength   Strength 5/5 except as noted.   Right iliopsoas: 3/5  Left iliopsoas: 4/5       Right hip flexion limited by pain     REFLEXES     Reflexes   Right brachioradialis: 1+  Left brachioradialis: 1+  Right biceps: 1+  Left biceps: 1+  Right patellar: 1+  Left patellar: 0 (prior replacement)  Right achilles: 1+  Left achilles: 1+  Right : 1+  Left : 1+  Right plantar: normal  Left plantar: normal    SENSORY EXAM   Light touch normal.       Significant Labs:   CBC:   Recent Labs   Lab 04/02/19  0552 04/02/19  1443 04/03/19  0359   WBC 1.70* 2.64* 3.27*   HGB 13.1 7.3* 7.9*   HCT 40.6 22.2* 24.0*   * 155 186     CMP:   Recent Labs   Lab 04/01/19  2207 04/02/19  0052 04/02/19  0552 04/03/19  0359   *  --  189* 163*     --  138 139   K 4.6  --  4.2 4.6     --  106 107   CO2 26  --  26 21*   BUN 14  --  14 13   CREATININE 1.3  --  1.2 1.0   CALCIUM 9.5  --  8.8 8.8   MG  --  1.3* 1.4* 1.9   PROT 6.7  --  6.1 6.4   ALBUMIN 3.8  --  3.3* 3.3*   BILITOT 0.4  --  0.4 0.4   ALKPHOS 156*  --  139* 128   AST 24  --  23 35   ALT 30  --  27 31   ANIONGAP 10  --  6* 11   EGFRNONAA 44*  --  48.9* >60.0       Significant Imaging: I have reviewed all pertinent imaging results/findings within the past 24 hours.

## 2019-04-03 NOTE — TELEPHONE ENCOUNTER
Received a request from  [facility] for patients medical records. The request was sent to our medical record department.    Maria Snider MA  [facility]

## 2019-04-03 NOTE — HPI
Mrs. Newell is a 59 year old woman with history of kidney transplant on 1/14/19, DM, HTN, HLD who is admitted for management of neutropenic fevers. Her postoperative course was complicated by recurrent UTIs, diarrhea and fever. Kidney US on 4/2 showed a fluid collection inferior to her incision (14.0 x 1.2 x 3.1 cm).     General neurology is consulted for leg weakness. She reports that since her transplant, she has experienced leg weakness with exertion and associated shortness of breath. She describes her legs as feeling like jello, and has been unable to use stairs because of this. Her symptoms do not fluctuate during the day, and are only exacerbated with exertion. She has chronic shoulder issues which limit her ability to lift above her head, but denies any new arm weakness. She reports numbness in her legs but denies any significant leg pain. She has a remote history of 3 lumbar spine surgeries (last ~10 years ago) but is unsure of the exact procedures or levels that were operated on. She has urinary incontinence since surgery, denies bowel incontinence. She denies any double vision or trouble swallowing.

## 2019-04-03 NOTE — ASSESSMENT & PLAN NOTE
- Post-op w/ recurrent Kleb pneumo UTI (1/30, 2/11, 2/25, 3/26)  - recently on Cipro, completed 4/10 day course. Rceived dose of zosyn in ED yesterday. Transitioned to Cefepime 4/2.   - Infectious w/u initiated. ID consulted.   - Blood/urine cx NGTD.  - Cefepime transitioned to Ertapenem 4/3 per ID recs

## 2019-04-03 NOTE — ASSESSMENT & PLAN NOTE
- a/w temp 100.7. Tylenol PRN for relief  - Infectious w/up initiated. Transitioned Cefepime to Ertapenem per ID recs  - Viral PCR pending

## 2019-04-03 NOTE — HPI
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 valganciclovir CMV Prevention trial, presents with fevers and fatigue.  Patient's post-operative course has been complicated by recurrent UTIs with ESBL Klebsiella, treated with multiple courses of ciprofloxacin.  Patient recently with dysuria and urinary hesitancy - urine cultures with pansensitive K. pneumo - she was started on cipro.  Over the weekend, patient with fevers and fatigue, presented to ED for further evaluation.  Patient also reports diarrhea for the last 2 weeks - more than 4 episodes per day, things related to magnesium repletion.

## 2019-04-03 NOTE — PROGRESS NOTES
[facility]  K[facility] Trans[facility]  Progress Note      Reason for Follow-up: Reassessment of Kidney Transplant - 1/14/2019  (#1) recipient and management of immunosuppression.    ORGAN: LEFT KIDNEY    Donor Type: Living    Donor CMV Status:   Donor HBcAB:  Donor HCV Status:  Donor HBV DERRICK:   Donor HCV DERRICK:       Subjective:   History of Present Illness:  Patient is a 59 y.o. female female who received a living kidney transplant on 1/14/19. Post operative course has been complicated by recurrent UTI with K jordyo, currently treating with 10 day course of Cipro, completed 4 days. She reports fatigue over the past 2 or so weeks, often staying in bed through the morning and taking naps in the afternoon. She began having diarrhea over the past month or so also, this has been worked up and was negative for C dif on 3/6 and 3/13. She presented to the ED 4/1 due to fevers which steadily increased to a tmax of 101.2, she denies recent fevers prior to this, even with previous UTI. Reports mild nausea without emesis. She denies dark urine, edema, abdominal pain, or decreased UOP.  Admit to TSU for further work-up.    Hospital Course:  Admitted for fevers and neutropenia, reports having chills x 1 day. Viral PCR pending. CMV PCR 4/2 pending. A dose of zosyn in ED on admit, transitioned to Cefepime 4/2 for ongoing UTI. ID consulted, with recs to start Ertapenem. Kidney US 4/2 showed a fluid collection inferior to the surgical incision measuring 14.0 x 1.2 x 3.1 cm, mildly decreased in size from prior measurement of 12.7 x 1.9 x 6.6 cm. Renal fxn stable. Will obtain CT abd/pelvis to look for infection. C dif pending for ongoing diarrhea. VSS. Neuro consulted for reports of leg weakness. Appreciate recs. Cont to monitor         Ms. Newell is a 59 y.o. year old female who is status post Kidney Transplant - 1/14/2019  (#1).    Her maintenance immunosuppression consists of:   Immunosuppressants (From admission,  onward)    Start     Stop Route Frequency Ordered    04/02/19 0800  tacrolimus capsule 1 mg      -- Oral 2 times daily 04/02/19 0149          Past Medical, Surgical, Family, and Social History:   Unchanged from H&P.    Scheduled Meds:   atorvastatin  40 mg Oral Daily    atovaquone  1,500 mg Oral Daily    ergocalciferol  50,000 Units Oral Q7 Days    ertapenem (INVANZ) IVPB  1 g Intravenous Q24H    famotidine  20 mg Oral BID    insulin detemir U-100  20 Units Subcutaneous QHS    INV acyclovir/placebo  400 mg Oral Q12H    INV MK-8228/placebo  480 mg Oral Daily    INV valganciclovir/placebo  900 mg Oral Daily    levothyroxine  75 mcg Oral Before breakfast    LORazepam  1 mg Oral BID    magnesium oxide  800 mg Oral Daily    multivitamin  1 tablet Oral Daily    sodium chloride 0.9%  3 mL Intravenous Q8H    tacrolimus  1 mg Oral BID    vilazodone  40 mg Oral Daily     Continuous Infusions:  PRN Meds:acetaminophen, dextrose 50%, dextrose 50%, glucagon (human recombinant), glucose, glucose, insulin aspart U-100, ondansetron    Intake/Output - Last 3 Shifts       04/01 0700 - 04/02 0659 04/02 0700 - 04/03 0659 04/03 0700 - 04/04 0659    I.V. (mL/kg)  53 (0.5)     IV Piggyback 1100      Total Intake(mL/kg) 1100 (11.2) 53 (0.5)     Net +1100 +53                   Review of Systems   Constitutional: Positive for activity change, appetite change and fatigue. Negative for chills and fever.   Respiratory: Negative for cough and shortness of breath.    Cardiovascular: Negative for chest pain and leg swelling.   Gastrointestinal: Positive for abdominal pain. Negative for abdominal distention, constipation, diarrhea, nausea and vomiting.   Genitourinary: Negative for decreased urine volume, difficulty urinating and dysuria.   Allergic/Immunologic: Positive for immunocompromised state.   Neurological: Positive for weakness (LE). Negative for headaches.   Psychiatric/Behavioral: Negative for agitation and behavioral  "problems.      Objective:     Vital Signs (Most Recent):  Temp: 98.2 °F (36.8 °C) (04/03/19 1355)  Pulse: 71 (04/03/19 1355)  Resp: 13 (04/03/19 1355)  BP: 128/60 (04/03/19 1300)  SpO2: 96 % (04/03/19 1355) Vital Signs (24h Range):  Temp:  [98.1 °F (36.7 °C)-98.7 °F (37.1 °C)] 98.2 °F (36.8 °C)  Pulse:  [60-98] 71  Resp:  [13-20] 13  SpO2:  [93 %-99 %] 96 %  BP: (118-189)/(56-77) 128/60     Weight: 98 kg (216 lb)  Height: 5' 7" (170.2 cm)  Body mass index is 33.83 kg/m².    Physical Exam    Laboratory:  CBC:   Recent Labs   Lab 04/02/19  0552 04/02/19  1443 04/03/19  0359   WBC 1.70* 2.64* 3.27*   RBC 4.32 2.37* 2.58*   HGB 13.1 7.3* 7.9*   HCT 40.6 22.2* 24.0*   * 155 186   MCV 94 94 93   MCH 30.3 30.8 30.6   MCHC 32.3 32.9 32.9     CMP:   Recent Labs   Lab 04/01/19  2207 04/02/19  0552 04/03/19  0359   * 189* 163*   CALCIUM 9.5 8.8 8.8   ALBUMIN 3.8 3.3* 3.3*   PROT 6.7 6.1 6.4    138 139   K 4.6 4.2 4.6   CO2 26 26 21*    106 107   BUN 14 14 13   CREATININE 1.3 1.2 1.0   ALKPHOS 156* 139* 128   ALT 30 27 31   AST 24 23 35     Labs within the past 24 hours have been reviewed.    Diagnostic Results:  reviewed    Assessment/Plan:     * Fever  - a/w temp 100.7. Tylenol PRN for relief  - Infectious w/up initiated. Transitioned Cefepime to Ertapenem per ID recs  - Viral PCR pending    UTI (urinary tract infection)  - Post-op w/ recurrent Kleb pneumo UTI (1/30, 2/11, 2/25, 3/26)  - recently on Cipro, completed 4/10 day course. Rceived dose of zosyn in ED yesterday. Transitioned to Cefepime 4/2.   - Infectious w/u initiated. ID consulted.   - Blood/urine cx NGTD.  - Cefepime transitioned to Ertapenem 4/3 per ID recs      Kidney transplant recipient  - S/p Living kidney transplant 2/2 DM/HTN on 1/14. Hx recurrent Kleb pneumo UTI (1/30, 2/11, 2/25, 3/26)  - Kidney US 4/2: collection inferior to the surgical incision measuring 14.0 x 1.2 x 3.1 cm, mildly decreased in size from prior measurement of " 12.7 x 1.9 x 6.6 cm.  - Kidney fxn stable  - CT abd/pelvis to assess for infection          Hypomagnesemia  - Replace PRN. Monitor      Acute on chronic anemia  - H/H decreased this morning, but stable  - Iron studies pending. Cont to monitor       Vitamin D deficiency  - cont ergo weekly      At risk for opportunistic infections  - Continue atovaquone for PCP prophylaxis   - part of CMV study.   - CMV PCR 4/2 pending        Long-term use of immunosuppressant medication  - Continue Prograf. Will monitor for signs of toxic side effects, check daily tacrolimus troughs, and change meds accordingly  - cellcept on hold for leukopenia              Discharge Planning: Tentative d/c tomorrow/friday     Ginny West PA-C  Kidney Transplant  [facility]

## 2019-04-03 NOTE — CLINICAL REVIEW
Staff Transplant Nephrology addendum:  Patient was seen and examined with Ginny LONG         I agree with assessment and plan. I spoke with the patient for 15 minutes and explained  current plan. Patient and family voiced understanding. All questions answered    Kidney transplant 1/4/2019    Recurrent UTI's    Admitted with fever malaise: CMV PCR, viral panel pending    Kidney US showed fluid collection inferior to the surgical incision 14 by 1.2 by 3.1, will discuss with surgery need for drainage and cultures.     Will add iron stores and ferritin to the labs this morning     CMV pending    Viral panel in process    Blood cultures reviewed No growth    ID following as well. ertopenem

## 2019-04-03 NOTE — ASSESSMENT & PLAN NOTE
Bilateral leg weakness with exertion since transplant. With her history of multiple spinal surgeries, there is likely a component of neurogenic claudication. Other differentials include a diabetic lumbosacral plexopathy, or compressive plexopathy due to the fluid collection.     Recommendations:  -- MRI lumbar spine without contrast  -- Physical therapy evaluation

## 2019-04-03 NOTE — ASSESSMENT & PLAN NOTE
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 valganciclovir CMV Prevention trial, recurrent UTIs with ESBL Klebsiella, presents with fevers and fatigue.     Recommendations:  - Start ertapenem 1 g IV qdaily  - Follow-up urine and blood cultures  - Follow-up CMV PCR

## 2019-04-04 PROBLEM — R19.7 DIARRHEA: Status: ACTIVE | Noted: 2019-04-04

## 2019-04-04 LAB
ALBUMIN SERPL BCP-MCNC: 3.5 G/DL (ref 3.5–5.2)
ALP SERPL-CCNC: 133 U/L (ref 55–135)
ALT SERPL W/O P-5'-P-CCNC: 30 U/L (ref 10–44)
ANION GAP SERPL CALC-SCNC: 10 MMOL/L (ref 8–16)
AST SERPL-CCNC: 28 U/L (ref 10–40)
BACTERIA UR CULT: NO GROWTH
BASOPHILS # BLD AUTO: 0.01 K/UL (ref 0–0.2)
BASOPHILS NFR BLD: 0.4 % (ref 0–1.9)
BILIRUB SERPL-MCNC: 0.4 MG/DL (ref 0.1–1)
BUN SERPL-MCNC: 13 MG/DL (ref 8–23)
C DIFF GDH STL QL: NEGATIVE
C DIFF TOX A+B STL QL IA: NEGATIVE
CALCIUM SERPL-MCNC: 9.2 MG/DL (ref 8.7–10.5)
CHLORIDE SERPL-SCNC: 108 MMOL/L (ref 95–110)
CO2 SERPL-SCNC: 23 MMOL/L (ref 23–29)
CREAT SERPL-MCNC: 1 MG/DL (ref 0.5–1.4)
DIFFERENTIAL METHOD: ABNORMAL
EOSINOPHIL # BLD AUTO: 0.1 K/UL (ref 0–0.5)
EOSINOPHIL NFR BLD: 1.9 % (ref 0–8)
ERYTHROCYTE [DISTWIDTH] IN BLOOD BY AUTOMATED COUNT: 15.7 % (ref 11.5–14.5)
EST. GFR  (AFRICAN AMERICAN): >60 ML/MIN/1.73 M^2
EST. GFR  (NON AFRICAN AMERICAN): >60 ML/MIN/1.73 M^2
GLUCOSE SERPL-MCNC: 147 MG/DL (ref 70–110)
HCT VFR BLD AUTO: 27.1 % (ref 37–48.5)
HGB BLD-MCNC: 8.5 G/DL (ref 12–16)
IMM GRANULOCYTES # BLD AUTO: 0.06 K/UL (ref 0–0.04)
IMM GRANULOCYTES NFR BLD AUTO: 2.3 % (ref 0–0.5)
LYMPHOCYTES # BLD AUTO: 0.5 K/UL (ref 1–4.8)
LYMPHOCYTES NFR BLD: 19 % (ref 18–48)
MAGNESIUM SERPL-MCNC: 1.7 MG/DL (ref 1.6–2.6)
MCH RBC QN AUTO: 30 PG (ref 27–31)
MCHC RBC AUTO-ENTMCNC: 31.4 G/DL (ref 32–36)
MCV RBC AUTO: 96 FL (ref 82–98)
MONOCYTES # BLD AUTO: 0.4 K/UL (ref 0.3–1)
MONOCYTES NFR BLD: 14.1 % (ref 4–15)
NEUTROPHILS # BLD AUTO: 1.6 K/UL (ref 1.8–7.7)
NEUTROPHILS NFR BLD: 62.3 % (ref 38–73)
NRBC BLD-RTO: 0 /100 WBC
OB PNL STL: NEGATIVE
PHOSPHATE SERPL-MCNC: 3.7 MG/DL (ref 2.7–4.5)
PLATELET # BLD AUTO: 183 K/UL (ref 150–350)
PMV BLD AUTO: 10 FL (ref 9.2–12.9)
POCT GLUCOSE: 170 MG/DL (ref 70–110)
POCT GLUCOSE: 183 MG/DL (ref 70–110)
POCT GLUCOSE: 190 MG/DL (ref 70–110)
POCT GLUCOSE: 191 MG/DL (ref 70–110)
POTASSIUM SERPL-SCNC: 4.5 MMOL/L (ref 3.5–5.1)
PROT SERPL-MCNC: 6.6 G/DL (ref 6–8.4)
RBC # BLD AUTO: 2.83 M/UL (ref 4–5.4)
SODIUM SERPL-SCNC: 141 MMOL/L (ref 136–145)
TACROLIMUS BLD-MCNC: 5.4 NG/ML (ref 5–15)
WBC # BLD AUTO: 2.63 K/UL (ref 3.9–12.7)
WBC #/AREA STL HPF: NORMAL /[HPF]

## 2019-04-04 PROCEDURE — 99233 PR SUBSEQUENT HOSPITAL CARE,LEVL III: ICD-10-PCS | Mod: ,,, | Performed by: PHYSICIAN ASSISTANT

## 2019-04-04 PROCEDURE — 84100 ASSAY OF PHOSPHORUS: CPT

## 2019-04-04 PROCEDURE — 87209 SMEAR COMPLEX STAIN: CPT

## 2019-04-04 PROCEDURE — A4216 STERILE WATER/SALINE, 10 ML: HCPCS | Performed by: STUDENT IN AN ORGANIZED HEALTH CARE EDUCATION/TRAINING PROGRAM

## 2019-04-04 PROCEDURE — 87045 FECES CULTURE AEROBIC BACT: CPT

## 2019-04-04 PROCEDURE — 82272 OCCULT BLD FECES 1-3 TESTS: CPT

## 2019-04-04 PROCEDURE — 89055 LEUKOCYTE ASSESSMENT FECAL: CPT

## 2019-04-04 PROCEDURE — 82656 EL-1 FECAL QUAL/SEMIQ: CPT

## 2019-04-04 PROCEDURE — 99233 SBSQ HOSP IP/OBS HIGH 50: CPT | Mod: GC,,, | Performed by: PSYCHIATRY & NEUROLOGY

## 2019-04-04 PROCEDURE — 87046 STOOL CULTR AEROBIC BACT EA: CPT | Mod: 59

## 2019-04-04 PROCEDURE — 11000001 HC ACUTE MED/SURG PRIVATE ROOM

## 2019-04-04 PROCEDURE — 99233 PR SUBSEQUENT HOSPITAL CARE,LEVL III: ICD-10-PCS | Mod: GC,,, | Performed by: PSYCHIATRY & NEUROLOGY

## 2019-04-04 PROCEDURE — 83993 ASSAY FOR CALPROTECTIN FECAL: CPT

## 2019-04-04 PROCEDURE — 87338 HPYLORI STOOL AG IA: CPT

## 2019-04-04 PROCEDURE — 87329 GIARDIA AG IA: CPT

## 2019-04-04 PROCEDURE — 80197 ASSAY OF TACROLIMUS: CPT

## 2019-04-04 PROCEDURE — 63600175 PHARM REV CODE 636 W HCPCS: Performed by: STUDENT IN AN ORGANIZED HEALTH CARE EDUCATION/TRAINING PROGRAM

## 2019-04-04 PROCEDURE — 87449 NOS EACH ORGANISM AG IA: CPT

## 2019-04-04 PROCEDURE — 36415 COLL VENOUS BLD VENIPUNCTURE: CPT

## 2019-04-04 PROCEDURE — 25000003 PHARM REV CODE 250: Performed by: STUDENT IN AN ORGANIZED HEALTH CARE EDUCATION/TRAINING PROGRAM

## 2019-04-04 PROCEDURE — 63600175 PHARM REV CODE 636 W HCPCS: Performed by: PHYSICIAN ASSISTANT

## 2019-04-04 PROCEDURE — 87427 SHIGA-LIKE TOXIN AG IA: CPT

## 2019-04-04 PROCEDURE — 80053 COMPREHEN METABOLIC PANEL: CPT

## 2019-04-04 PROCEDURE — 83735 ASSAY OF MAGNESIUM: CPT

## 2019-04-04 PROCEDURE — 89125 SPECIMEN FAT STAIN: CPT

## 2019-04-04 PROCEDURE — 63600175 PHARM REV CODE 636 W HCPCS: Performed by: INTERNAL MEDICINE

## 2019-04-04 PROCEDURE — 99233 SBSQ HOSP IP/OBS HIGH 50: CPT | Mod: ,,, | Performed by: PHYSICIAN ASSISTANT

## 2019-04-04 PROCEDURE — 85025 COMPLETE CBC W/AUTO DIFF WBC: CPT

## 2019-04-04 PROCEDURE — 87425 ROTAVIRUS AG IA: CPT

## 2019-04-04 RX ORDER — LORAZEPAM 2 MG/ML
2 INJECTION INTRAMUSCULAR ONCE
Status: COMPLETED | OUTPATIENT
Start: 2019-04-04 | End: 2019-04-04

## 2019-04-04 RX ORDER — TACROLIMUS 1 MG/1
2 CAPSULE ORAL EVERY MORNING
Status: DISCONTINUED | OUTPATIENT
Start: 2019-04-05 | End: 2019-04-05 | Stop reason: HOSPADM

## 2019-04-04 RX ORDER — TACROLIMUS 1 MG/1
1 CAPSULE ORAL EVERY EVENING
Status: DISCONTINUED | OUTPATIENT
Start: 2019-04-04 | End: 2019-04-05 | Stop reason: HOSPADM

## 2019-04-04 RX ADMIN — FAMOTIDINE 20 MG: 20 TABLET ORAL at 09:04

## 2019-04-04 RX ADMIN — LORAZEPAM 1 MG: 1 TABLET ORAL at 09:04

## 2019-04-04 RX ADMIN — MAGNESIUM OXIDE TAB 400 MG (241.3 MG ELEMENTAL MG) 800 MG: 400 (241.3 MG) TAB at 09:04

## 2019-04-04 RX ADMIN — INSULIN ASPART 2 UNITS: 100 INJECTION, SOLUTION INTRAVENOUS; SUBCUTANEOUS at 05:04

## 2019-04-04 RX ADMIN — ERTAPENEM 1 G: 1 INJECTION INTRAMUSCULAR; INTRAVENOUS at 07:04

## 2019-04-04 RX ADMIN — TACROLIMUS 1 MG: 1 CAPSULE ORAL at 06:04

## 2019-04-04 RX ADMIN — INSULIN ASPART 1 UNITS: 100 INJECTION, SOLUTION INTRAVENOUS; SUBCUTANEOUS at 09:04

## 2019-04-04 RX ADMIN — INSULIN DETEMIR 20 UNITS: 100 INJECTION, SOLUTION SUBCUTANEOUS at 09:04

## 2019-04-04 RX ADMIN — INSULIN ASPART 2 UNITS: 100 INJECTION, SOLUTION INTRAVENOUS; SUBCUTANEOUS at 11:04

## 2019-04-04 RX ADMIN — INSULIN ASPART 2 UNITS: 100 INJECTION, SOLUTION INTRAVENOUS; SUBCUTANEOUS at 08:04

## 2019-04-04 RX ADMIN — Medication 3 ML: at 09:04

## 2019-04-04 RX ADMIN — THERA TABS 1 TABLET: TAB at 09:04

## 2019-04-04 RX ADMIN — LORAZEPAM 2 MG: 2 INJECTION, SOLUTION INTRAMUSCULAR; INTRAVENOUS at 02:04

## 2019-04-04 RX ADMIN — VILAZODONE HYDROCHLORIDE 40 MG: 40 TABLET ORAL at 10:04

## 2019-04-04 RX ADMIN — TACROLIMUS 1 MG: 1 CAPSULE ORAL at 09:04

## 2019-04-04 RX ADMIN — LEVOTHYROXINE SODIUM 75 MCG: 75 TABLET ORAL at 06:04

## 2019-04-04 RX ADMIN — Medication 3 ML: at 06:04

## 2019-04-04 RX ADMIN — ATORVASTATIN CALCIUM 40 MG: 10 TABLET, FILM COATED ORAL at 09:04

## 2019-04-04 NOTE — CLINICAL REVIEW
Staff Transplant Nephrology addendum:  Patient was seen and examined with Ginny LONG      I agree with assessment and plan. I spoke with the patient for 15 minutes and explained  current plan. Patient and family voiced understanding. All questions answered  \    Recurrent UTI, ID following ertopenem as per ID for 14 days    Diarrhea: will collect samples for c diff    Neurology neurogen claudication: Lumbosacral plexopathy, compressive plexopathy    PT    Will get the versed in anticipation to her MRI     GFR normal;    Electrolytes normal     VS normal    Prograf level: 2/1 and level in am     Will d/c magnesium oxide

## 2019-04-04 NOTE — SUBJECTIVE & OBJECTIVE
Subjective:   History of Present Illness:  Patient is a 59 y.o. female female who received a living kidney transplant on 1/14/19. Post operative course has been complicated by recurrent UTI with K pneumo, currently treating with 10 day course of Cipro, completed 4 days. She reports fatigue over the past 2 or so weeks, often staying in bed through the morning and taking naps in the afternoon. She began having diarrhea over the past month or so also, this has been worked up and was negative for C dif on 3/6 and 3/13. She presented to the ED 4/1 due to fevers which steadily increased to a tmax of 101.2, she denies recent fevers prior to this, even with previous UTI. Reports mild nausea without emesis. She denies dark urine, edema, abdominal pain, or decreased UOP.  Admit to TSU for further work-up.    Hospital Course:  Admitted for fevers and neutropenia, reports having chills x 1 day. Viral PCR pending. CMV PCR 4/2 pending. A dose of zosyn in ED on admit, transitioned to Cefepime 4/2 for ongoing UTI. ID consulted, with recs to complete 14 day course of Ertapenem for presumed pyelo, finishing on 4/15. Kidney US 4/2 showed a fluid collection inferior to the surgical incision measuring 14.0 x 1.2 x 3.1 cm, mildly decreased in size from prior measurement of 12.7 x 1.9 x 6.6 cm. Renal fxn stable. CT scan showing small amt of fluid along incision line, reviewed with surgeon. Not amenable for draining at this time. Pt reports multiple bouts of diarrhea. Stool studies pending. CMV undetected. Viral panel negative. Neurology consulted for weakness on ambulation. Clinical description suggestive of neurogenic claudication related to 3 prior lumbar spine surgeries. Per neuro recs, will obtain MRI lumbar spine. VSS. Cont to monitor        Ms. Newell is a 59 y.o. year old female who is status post Kidney Transplant - 1/14/2019  (#1).    Her maintenance immunosuppression consists of:   Immunosuppressants (From admission,  onward)    Start     Stop Route Frequency Ordered    04/05/19 0800  tacrolimus capsule 2 mg      -- Oral Daily 04/04/19 1127    04/04/19 1800  tacrolimus capsule 1 mg      -- Oral Daily 04/04/19 1127          Hospital Course:  No notes on file    Past Medical, Surgical, Family, and Social History:   Unchanged from H&P.    Scheduled Meds:   atorvastatin  40 mg Oral Daily    atovaquone  1,500 mg Oral Daily    ergocalciferol  50,000 Units Oral Q7 Days    ertapenem (INVANZ) IVPB  1 g Intravenous Q24H    famotidine  20 mg Oral BID    insulin detemir U-100  20 Units Subcutaneous QHS    INV acyclovir/placebo  400 mg Oral Q12H    INV MK-8228/placebo  480 mg Oral Daily    INV valganciclovir/placebo  900 mg Oral Daily    levothyroxine  75 mcg Oral Before breakfast    LORazepam  1 mg Oral BID    multivitamin  1 tablet Oral Daily    sodium chloride 0.9%  3 mL Intravenous Q8H    tacrolimus  1 mg Oral Daily    [START ON 4/5/2019] tacrolimus  2 mg Oral Daily    vilazodone  40 mg Oral Daily     Continuous Infusions:  PRN Meds:acetaminophen, dextrose 50%, dextrose 50%, glucagon (human recombinant), glucose, glucose, insulin aspart U-100, ondansetron    Intake/Output - Last 3 Shifts       04/02 0700 - 04/03 0659 04/03 0700 - 04/04 0659 04/04 0700 - 04/05 0659    P.O.  275     I.V. (mL/kg) 53 (0.5)      IV Piggyback  200     Total Intake(mL/kg) 53 (0.5) 475 (5.1)     Urine (mL/kg/hr)  700 (0.3) 400 (0.5)    Stool  0     Total Output  700 400    Net +53 -225 -400           Stool Occurrence  0 x            Review of Systems   Constitutional: Positive for activity change, appetite change and fatigue. Negative for chills and fever.   Respiratory: Negative for cough and shortness of breath.    Cardiovascular: Negative for chest pain and leg swelling.   Gastrointestinal: Positive for diarrhea. Negative for abdominal distention, abdominal pain, constipation, nausea and vomiting.   Genitourinary: Negative for decreased urine  "volume, difficulty urinating and dysuria.   Musculoskeletal: Positive for gait problem.   Allergic/Immunologic: Positive for immunocompromised state.   Neurological: Positive for weakness (LE). Negative for headaches.   Psychiatric/Behavioral: Negative for agitation and behavioral problems.      Objective:     Vital Signs (Most Recent):  Temp: 97.8 °F (36.6 °C) (04/04/19 1202)  Pulse: 85 (04/04/19 1202)  Resp: 18 (04/04/19 1202)  BP: 136/85 (04/04/19 1202)  SpO2: 97 % (04/04/19 1202) Vital Signs (24h Range):  Temp:  [97.8 °F (36.6 °C)-98.4 °F (36.9 °C)] 97.8 °F (36.6 °C)  Pulse:  [60-85] 85  Resp:  [16-18] 18  SpO2:  [92 %-97 %] 97 %  BP: (124-147)/(58-85) 136/85     Weight: 93.2 kg (205 lb 7.5 oz)  Height: 5' 7" (170.2 cm)  Body mass index is 32.18 kg/m².    Physical Exam   Constitutional: She is oriented to person, place, and time. She appears well-developed.   Eyes: Pupils are equal, round, and reactive to light. EOM are normal. No scleral icterus.   Neck: Normal range of motion.   Cardiovascular: Normal rate, regular rhythm and normal heart sounds.   No murmur heard.  Pulmonary/Chest: Effort normal and breath sounds normal. No respiratory distress. She has no wheezes.   Abdominal: Soft. Bowel sounds are normal. She exhibits no distension. There is no tenderness. There is no guarding.   Musculoskeletal: Normal range of motion.   Neurological: She is alert and oriented to person, place, and time.   Skin: Skin is warm and dry.   Nursing note and vitals reviewed.      Laboratory:  CBC:   Recent Labs   Lab 04/02/19  1443 04/03/19  0359 04/04/19  0402   WBC 2.64* 3.27* 2.63*   RBC 2.37* 2.58* 2.83*   HGB 7.3* 7.9* 8.5*   HCT 22.2* 24.0* 27.1*    186 183   MCV 94 93 96   MCH 30.8 30.6 30.0   MCHC 32.9 32.9 31.4*     CMP:   Recent Labs   Lab 04/02/19  0552 04/03/19  0359 04/04/19  0402   * 163* 147*   CALCIUM 8.8 8.8 9.2   ALBUMIN 3.3* 3.3* 3.5   PROT 6.1 6.4 6.6    139 141   K 4.2 4.6 4.5   CO2 26 " 21* 23    107 108   BUN 14 13 13   CREATININE 1.2 1.0 1.0   ALKPHOS 139* 128 133   ALT 27 31 30   AST 23 35 28     Labs within the past 24 hours have been reviewed.    Diagnostic Results:  MRI results pending

## 2019-04-04 NOTE — ASSESSMENT & PLAN NOTE
- S/p Living kidney transplant 2/2 DM/HTN on 1/14. Hx recurrent Kleb pneumo UTI (1/30, 2/11, 2/25, 3/26)  - Kidney US 4/2: collection inferior to the surgical incision measuring 14.0 x 1.2 x 3.1 cm, mildly decreased in size from prior measurement of 12.7 x 1.9 x 6.6 cm.  - Kidney fxn stable  - CT abd/pelvis w/ small fluid collection along incision line reviewed by surgeon. No need to drain at this time

## 2019-04-04 NOTE — PROGRESS NOTES
Diarrhea decision tree reviewed with OLGA Bernal RN. Patient has had only one recorded liquid stool with pasty stool prior to that. C-diff not sent

## 2019-04-04 NOTE — ASSESSMENT & PLAN NOTE
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 valganciclovir CMV Prevention trial, recurrent UTIs with ESBL Klebsiella, recently found to have pansensitive Klebsiella UTI 3/26/2019, presents with fevers and fatigue despite being on ciprofloxacin - concern for ESBL Klebsiella transplant pyelo with resistance to cipro as patient has since defervesed on empiric ertapenem.  US with collection inferior to surgical incision, CT notes small amount of fluid along incision line - likely not amenable to drainage.      Recommendations:  - Continue ertapenem 1 g IV qdaily - plan for total 14 day course for presumed ESBL Kleb pyelonephritis  - Send qweekly CBC with diff, CMP to [phone]  - Plan for ID follow-up at end of antibiotics course

## 2019-04-04 NOTE — ASSESSMENT & PLAN NOTE
- Post-op w/ recurrent Kleb pneumo UTI (1/30, 2/11, 2/25, 3/26)  - recently on Cipro, completed 4/10 day course. Rceived dose of zosyn in ED yesterday. Transitioned to Cefepime 4/2.   - Infectious w/u initiated. ID consulted.   - Blood/urine cx NGTD.  - Cefepime transitioned to Ertapenem 4/3 per ID recs-- to complete 14 day course on 4/15

## 2019-04-04 NOTE — SUBJECTIVE & OBJECTIVE
Subjective:     Interval History: Patient reports no worsening in her weakness since last neurology examination. Admits that she has not been ambulating much since admission though subjectively feels some improved lower extremity strength. Back pain is stable. Says this morning that she is extremely claustrophobic and is unlikely to tolerate an MRI without sedation. MRI obtained this afternoon under conscious sedation. Afebrile overnight and for >last 24 hours.    Current Neurological Medications:   Lorazepam 1g PO BID  Vilazodone 50mg qD    Current Facility-Administered Medications   Medication Dose Route Frequency Provider Last Rate Last Dose    acetaminophen tablet 650 mg  650 mg Oral Q4H PRN Bogdan Pineda MD        atorvastatin tablet 40 mg  40 mg Oral Daily Bogdan Pineda MD   40 mg at 04/04/19 0914    atovaquone suspension 1,500 mg  1,500 mg Oral Daily Bogdan Pineda MD        dextrose 50% injection 12.5 g  12.5 g Intravenous PRN Bogdan Pineda MD        dextrose 50% injection 25 g  25 g Intravenous PRN Bogdan Pineda MD        ergocalciferol capsule 50,000 Units  50,000 Units Oral Q7 Days Bogdan Pineda MD   50,000 Units at 04/02/19 0919    ertapenem (INVANZ) 1 g in sodium chloride 0.9 % 100 mL IVPB (ready to mix system)  1 g Intravenous Q24H Cammie Kessler  mL/hr at 04/03/19 1949 1 g at 04/03/19 1949    famotidine tablet 20 mg  20 mg Oral BID Bogdan Pineda MD   20 mg at 04/04/19 0914    glucagon (human recombinant) injection 1 mg  1 mg Intramuscular PRN Bogdan Pineda MD        glucose chewable tablet 16 g  16 g Oral PRN Bogdan Pineda MD        glucose chewable tablet 24 g  24 g Oral PRN Bogdan Pineda MD        insulin aspart U-100 pen 1-10 Units  1-10 Units Subcutaneous QID (AC + HS) PRN Bogdan Pineda MD   2 Units at 04/04/19 1119    insulin detemir U-100 pen 20 Units  20 Units Subcutaneous QHS Bogdan Pineda MD   20 Units at 04/03/19 2209    INV acyclovir/placebo capsule 400 mg  400 mg Oral  Q12H Bogdan Pineda MD   400 mg at 04/04/19 0917    INV MK-8228/placebo 480 mg oral tablet 480 mg  480 mg Oral Daily Bogdan Pineda MD   480 mg at 04/04/19 0917    INV valganciclovir/placebo tablet 900 mg  900 mg Oral Daily Bogdan Pineda MD   900 mg at 04/04/19 0917    levothyroxine tablet 75 mcg  75 mcg Oral Before breakfast Bogdan Pineda MD   75 mcg at 04/04/19 0627    LORazepam tablet 1 mg  1 mg Oral BID Bogdan Pineda MD   1 mg at 04/04/19 0915    multivitamin tablet 1 tablet  1 tablet Oral Daily Bogdan Pineda MD   1 tablet at 04/04/19 0914    ondansetron injection 4 mg  4 mg Intravenous Q6H PRN Bogdan Pineda MD        sodium chloride 0.9% flush 3 mL  3 mL Intravenous Q8H Bogdan Pineda MD   3 mL at 04/04/19 0627    tacrolimus capsule 1 mg  1 mg Oral Daily Ginny West PA-C        [START ON 4/5/2019] tacrolimus capsule 2 mg  2 mg Oral Daily Ginny West PA-C        vilazodone tablet 40 mg  40 mg Oral Daily Bogdan Pineda MD   40 mg at 04/04/19 1035       Review of Systems   Constitutional: Negative for activity change, chills and fever.   HENT: Negative for sinus pressure and trouble swallowing.    Eyes: Negative for pain and visual disturbance.   Respiratory: Negative for cough, chest tightness and shortness of breath.    Cardiovascular: Negative for chest pain and palpitations.   Gastrointestinal: Negative for abdominal pain, diarrhea, nausea and vomiting.   Genitourinary: Negative for difficulty urinating and dysuria.   Musculoskeletal: Positive for back pain. Negative for neck pain.   Skin: Negative for rash and wound.   Neurological: Positive for weakness. Negative for numbness and headaches.   Psychiatric/Behavioral: Negative for agitation and confusion. The patient is nervous/anxious.      Objective:     Vital Signs (Most Recent):  Temp: 97.8 °F (36.6 °C) (04/04/19 1202)  Pulse: 85 (04/04/19 1202)  Resp: 18 (04/04/19 1202)  BP: 136/85 (04/04/19 1202)  SpO2: 97 % (04/04/19 1202) Vital  Signs (24h Range):  Temp:  [97.8 °F (36.6 °C)-98.4 °F (36.9 °C)] 97.8 °F (36.6 °C)  Pulse:  [60-85] 85  Resp:  [16-18] 18  SpO2:  [92 %-97 %] 97 %  BP: (124-147)/(58-85) 136/85     Weight: 93.2 kg (205 lb 7.5 oz)  Body mass index is 32.18 kg/m².    Physical Exam   Constitutional: She is oriented to person, place, and time. She appears well-developed and well-nourished.   HENT:   Head: Normocephalic and atraumatic.   Eyes: Pupils are equal, round, and reactive to light. EOM are normal.   Cardiovascular: Normal rate.   Pulmonary/Chest: Effort normal. No respiratory distress.   Abdominal: Soft. She exhibits no distension.   Musculoskeletal: She exhibits tenderness (lower back).   Neurological: She is alert and oriented to person, place, and time.   Reflex Scores:       Bicep reflexes are 1+ on the right side and 1+ on the left side.       Brachioradialis reflexes are 1+ on the right side and 1+ on the left side.       Patellar reflexes are 1+ on the right side and 0 on the left side.       Achilles reflexes are 1+ on the right side and 1+ on the left side.  Skin: Skin is warm and dry.   Psychiatric: She has a normal mood and affect. Her speech is normal and behavior is normal.   Nursing note and vitals reviewed.      NEUROLOGICAL EXAMINATION:     MENTAL STATUS   Oriented to person, place, and time.   Attention: normal. Concentration: normal.   Speech: speech is normal   Knowledge: good.     CRANIAL NERVES     CN II   Visual fields full to confrontation.     CN III, IV, VI   Pupils are equal, round, and reactive to light.  Extraocular motions are normal.   Right pupil: Shape: regular. Reactivity: brisk. Consensual response: intact.   Left pupil: Shape: regular. Reactivity: brisk. Consensual response: intact.   CN III: no CN III palsy  CN VI: no CN VI palsy  Conjugate gaze: present    CN V   Facial sensation intact.     CN VII   Facial expression full, symmetric.     CN VIII   Hearing: intact    CN XI   Right  sternocleidomastoid strength: normal  Left sternocleidomastoid strength: normal  Right trapezius strength: normal  Left trapezius strength: normal    CN XII   CN XII normal.   Tongue: not atrophic  Tongue deviation: none    MOTOR EXAM   Muscle bulk: normal  Overall muscle tone: normal    Strength   Strength 5/5 except as noted.   Right iliopsoas: 4/5  Left iliopsoas: 4/5  Right quadriceps: 4/5  Left quadriceps: 4/5  Right hamstrin/5  Left hamstrin/5       Power exam partially limited by patient effort 2/2 back pain     REFLEXES     Reflexes   Right brachioradialis: 1+  Left brachioradialis: 1+  Right biceps: 1+  Left biceps: 1+  Right patellar: 1+  Left patellar: 0  Right achilles: 1+  Left achilles: 1+    SENSORY EXAM   Light touch normal.       Significant Labs:   CBC:   Recent Labs   Lab 19  0402   WBC 3.27* 2.63*   HGB 7.9* 8.5*   HCT 24.0* 27.1*    183     CMP:   Recent Labs   Lab 19  0402   * 147*    141   K 4.6 4.5    108   CO2 21* 23   BUN 13 13   CREATININE 1.0 1.0   CALCIUM 8.8 9.2   MG 1.9 1.7   PROT 6.4 6.6   ALBUMIN 3.3* 3.5   BILITOT 0.4 0.4   ALKPHOS 128 133   AST 35 28   ALT 31 30   ANIONGAP 11 10   EGFRNONAA >60.0 >60.0     All pertinent lab results from the past 24 hours have been reviewed.    Significant Imaging: I have reviewed all pertinent imaging results/findings within the past 24 hours.     CT Abdomen Pelvis With Contrast 4/3/19:    Degenerative/postsurgical changes are noted of the spine.  There is atherosclerotic calcification of the aorta and its branches.  No significant inguinal lymphadenopathy.      MRI Lumbar Spine Without Contrast 19:  FINDINGS:  There are postop changes of left-sided laminectomies extending from L3 through L5.  The lumbar alignment is within normal limits.  The vertebral body heights are maintained.  The bone marrow signal is within normal limits.    The conus terminates at the level of  L1.  There is mild thickening of the cauda equina nerve roots.  There is no definitive mass effect in the spinal canal.    There are Modic type II endplate changes involving the L3-L4 and L4-L5 levels.  There is mild disc desiccation at multiple levels.  Evaluation of the individual disc levels reveals the following:    T12-L1, normal.    L1-L2, there is diffuse disc bulge along with facet hypertrophy and ligamentum flavum hypertrophy.  There is superimposed central disc protrusion.  There is moderate narrowing of the spinal canal.  The neural foramina are unremarkable.    L2-L3, there is diffuse disc bulge along with facet hypertrophy and ligamentum flavum hypertrophy.  The spinal canal is within normal limits.  There is mild bilateral neural foraminal narrowing.    L3-L4, there is a left-sided postlaminectomy changes at this level.  There is diffuse disc bulge along with facet hypertrophy and ligamentum flavum hypertrophy.  There is small amount of soft tissue along the central aspect of the spinal canal adjacent to the disc.  The spinal canal is within normal limits.  There is mild bilateral neural foraminal narrowing.    L4-5, there is a left-sided post laminectomy changes at this level.  There is diffuse disc bulge along with facet hypertrophy and ligamentum flavum hypertrophy.  There is small amount of soft tissue along the central aspect of the spinal canal adjacent to the disc.  The spinal canal is within normal limits.  There is mild bilateral neural foraminal narrowing.    L5-S1, there is a left-sided post laminectomy changes at this level.  There is diffuse disc bulge along with facet hypertrophy and ligamentum flavum hypertrophy.  The spinal canal is within normal limits.  There is mild bilateral neural foraminal narrowing.    There are postoperative changes involving the paraspinous soft tissues.  There are simple appearing bilateral renal cysts.  Both kidneys appear atrophic.  The remainder of the  retroperitoneal structures are within normal limits.      Impression       No MR evidence of discitis osteomyelitis in the lumbar spine.    Postoperative changes of prior laminectomies at the L3, L4, and L5 levels.  Soft tissue located within the spinal canal adjacent to the disc.  The findings may represent a recurrent or residual disc.  Postcontrast imaging may be obtained for further evaluation.    Central disc protrusion at the L1-L2 level with associated moderate narrowing of the spinal canal.    Thickening of the cauda equina nerve roots.  Some of this may be secondary arachnoiditis.    Bilateral atrophic kidneys, most suggestive of end-stage renal disease.

## 2019-04-04 NOTE — ASSESSMENT & PLAN NOTE
Bilateral leg weakness with exertion since transplant. With her history of multiple spinal surgeries, there is likely a component of neurogenic claudication. Other differentials include a diabetic lumbosacral plexopathy, or compressive plexopathy due to the fluid collection. MRI L spine WO contrast from 4/4/19 shows mild disc desiccation at multiple levels, most notably at L1-L2, as well as neural foraminal narrowing.     Recommendations:  -- Pain control  -- Continue Physical therapy  -- Limit prolonged use of scheduled BZDs

## 2019-04-04 NOTE — PROGRESS NOTES
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 valganciclovir CMV Prevention trial, recurrent UTIs with ESBL Klebsiella, recently found to have pansensitive Klebsiella UTI 3/26/2019, presents with fevers and fatigue despite being on ciprofloxacin - concern for ESBL Klebsiella transplant pyelo with resistance to cipro as patient has since defervesed on empiric ertapenem.  US with collection inferior to surgical incision, CT notes small amount of fluid along incision line - likely not amenable to drainage.      Recommendations:  - Continue ertapenem 1 g IV qdaily - plan for total 14 day course for presumed ESBL Kleb pyelonephritis, last day 4/15/2019  - Send qweekly CBC with diff, CMP to [phone]  - Will arrange for ID follow-up at end of antibiotics course on 4/15/2019              Thank you for your consult. I will sign off. Please contact us if you have any additional questions.    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 valganciclovir CMV Prevention trial, presents with fevers and fatigue.  Patient's post-operative course has been complicated by recurrent UTIs with ESBL Klebsiella, treated with multiple courses of  ciprofloxacin.  Patient recently with dysuria and urinary hesitancy - urine cultures with pansensitive K. pneumo - she was started on cipro.  Over the weekend, patient with fevers and fatigue, presented to ED for further evaluation.  Patient also reports diarrhea for the last 2 weeks - more than 4 episodes per day, things related to magnesium repletion.  Interval History:  Fevers have resolved  Reports 3 episodes of diarrhea  Continues to feel fatigued    Review of Systems   Constitutional: Positive for fatigue. Negative for chills and fever.   HENT: Negative for rhinorrhea and sore throat.    Respiratory: Negative for cough and shortness of breath.    Cardiovascular: Negative for chest pain and leg swelling.   Gastrointestinal: Positive for diarrhea. Negative for abdominal pain, nausea and vomiting.   Genitourinary: Positive for dysuria. Negative for hematuria.   Musculoskeletal: Negative for arthralgias and myalgias.   Skin: Negative for rash.   Neurological: Negative for headaches.     Objective:     Vital Signs (Most Recent):  Temp: 98.3 °F (36.8 °C) (04/03/19 1938)  Pulse: 68 (04/03/19 1938)  Resp: 18 (04/03/19 1938)  BP: 135/63 (04/03/19 1938)  SpO2: (!) 92 % (04/03/19 1938) Vital Signs (24h Range):  Temp:  [98.2 °F (36.8 °C)-98.7 °F (37.1 °C)] 98.3 °F (36.8 °C)  Pulse:  [60-98] 68  Resp:  [13-20] 18  SpO2:  [92 %-99 %] 92 %  BP: (114-189)/(55-77) 135/63     Weight: 103.5 kg (228 lb 2.8 oz)  Body mass index is 35.74 kg/m².    Estimated Creatinine Clearance: 73.1 mL/min (based on SCr of 1 mg/dL).    Physical Exam   Constitutional: She is oriented to person, place, and time. She appears well-developed and well-nourished. No distress.   HENT:   Head: Normocephalic and atraumatic.   Eyes: Conjunctivae and EOM are normal.   Neck: Normal range of motion. Neck supple.   Cardiovascular: Normal rate, regular rhythm and normal heart sounds.   Pulmonary/Chest: Effort normal and breath sounds normal. No respiratory  distress. She has no wheezes. She has no rales.   Abdominal: Soft. She exhibits no distension. There is no tenderness. There is no guarding.   Musculoskeletal: Normal range of motion. She exhibits no edema.   Neurological: She is alert and oriented to person, place, and time.   Skin: Skin is warm and dry. No rash noted. She is not diaphoretic. No erythema.   Psychiatric: She has a normal mood and affect. Her behavior is normal.       Significant Labs: All pertinent labs within the past 24 hours have been reviewed.    Significant Imaging: I have reviewed all pertinent imaging results/findings within the past 24 hours.

## 2019-04-04 NOTE — SUBJECTIVE & OBJECTIVE
Interval History:  Fevers have resolved  Reports 3 episodes of diarrhea  Continues to feel fatigued    Review of Systems   Constitutional: Positive for fatigue. Negative for chills and fever.   HENT: Negative for rhinorrhea and sore throat.    Respiratory: Negative for cough and shortness of breath.    Cardiovascular: Negative for chest pain and leg swelling.   Gastrointestinal: Positive for diarrhea. Negative for abdominal pain, nausea and vomiting.   Genitourinary: Positive for dysuria. Negative for hematuria.   Musculoskeletal: Negative for arthralgias and myalgias.   Skin: Negative for rash.   Neurological: Negative for headaches.     Objective:     Vital Signs (Most Recent):  Temp: 98.3 °F (36.8 °C) (04/03/19 1938)  Pulse: 68 (04/03/19 1938)  Resp: 18 (04/03/19 1938)  BP: 135/63 (04/03/19 1938)  SpO2: (!) 92 % (04/03/19 1938) Vital Signs (24h Range):  Temp:  [98.2 °F (36.8 °C)-98.7 °F (37.1 °C)] 98.3 °F (36.8 °C)  Pulse:  [60-98] 68  Resp:  [13-20] 18  SpO2:  [92 %-99 %] 92 %  BP: (114-189)/(55-77) 135/63     Weight: 103.5 kg (228 lb 2.8 oz)  Body mass index is 35.74 kg/m².    Estimated Creatinine Clearance: 73.1 mL/min (based on SCr of 1 mg/dL).    Physical Exam   Constitutional: She is oriented to person, place, and time. She appears well-developed and well-nourished. No distress.   HENT:   Head: Normocephalic and atraumatic.   Eyes: Conjunctivae and EOM are normal.   Neck: Normal range of motion. Neck supple.   Cardiovascular: Normal rate, regular rhythm and normal heart sounds.   Pulmonary/Chest: Effort normal and breath sounds normal. No respiratory distress. She has no wheezes. She has no rales.   Abdominal: Soft. She exhibits no distension. There is no tenderness. There is no guarding.   Musculoskeletal: Normal range of motion. She exhibits no edema.   Neurological: She is alert and oriented to person, place, and time.   Skin: Skin is warm and dry. No rash noted. She is not diaphoretic. No erythema.    Psychiatric: She has a normal mood and affect. Her behavior is normal.       Significant Labs: All pertinent labs within the past 24 hours have been reviewed.    Significant Imaging: I have reviewed all pertinent imaging results/findings within the past 24 hours.

## 2019-04-04 NOTE — ASSESSMENT & PLAN NOTE
-- US with fluid collection below surgical incision, redemonstrated by CT A/P  -- management per primary

## 2019-04-04 NOTE — PLAN OF CARE
Problem: Adult Inpatient Plan of Care  Goal: Plan of Care Review  Outcome: Ongoing (interventions implemented as appropriate)  - Pt admitted 4/2/19 for fever (up to 101.2); pt is a kidney transplant recipient from 1/14/19 for HTN and T2DM.  - Infectious workup: Blood cx NGTD; urine cx pending; resp viral panel negative; CMV undetectable.  - Ultrasound and CT scan showed a fluid collection around pt's transplanted kidney, possibly a seroma.  - Pt doing well overnight. All VS WNL. Tmax 98.4. Pt denies pain; pt denies nausea.  - Pt is receiving IV ertapenem q24h. Per ID note, this is for presumed ESBL Klebsiella pyelonephritis and she will require 14 days of this.  - BG monitoring AC/HS. BG= 213 at bedtime. SSI and Levemir given.  - Pt is on an investigational anti-viral drug. Pt has this in her personal supply and has barcode for RN to scan.  - Neurology following pt for weakness with ambulation. Pt has a history of back surgery. A back MRI is contemplated but this has not been ordered yet.

## 2019-04-04 NOTE — PROGRESS NOTES
[facility]  Neurology  Progress Note    Patient Name: Andra Newell  MRN: [id number]  Admission Date: 4/2/2019  Hospital Length of Stay: 2 days  Code Status: Full Code   Attending Provider: Jose Bettencourt MD  Primary Care Physician: Gita Cohen MD   Principal Problem:Fever      Subjective:     Interval History: Patient reports no worsening in her weakness since last neurology examination. Admits that she has not been ambulating much since admission though subjectively feels some improved lower extremity strength. Back pain is stable. Says this morning that she is extremely claustrophobic and is unlikely to tolerate an MRI without sedation. MRI obtained this afternoon under conscious sedation. Afebrile overnight and for >last 24 hours.    Current Neurological Medications:   Lorazepam 1g PO BID  Vilazodone 50mg qD    Current Facility-Administered Medications   Medication Dose Route Frequency Provider Last Rate Last Dose    acetaminophen tablet 650 mg  650 mg Oral Q4H PRN Bogdan Pineda MD        atorvastatin tablet 40 mg  40 mg Oral Daily Bogdan Pineda MD   40 mg at 04/04/19 0914    atovaquone suspension 1,500 mg  1,500 mg Oral Daily Bogdan Pineda MD        dextrose 50% injection 12.5 g  12.5 g Intravenous PRN Bogdan Pineda MD        dextrose 50% injection 25 g  25 g Intravenous PRN Bogdan Pineda MD        ergocalciferol capsule 50,000 Units  50,000 Units Oral Q7 Days Bogdan Pineda MD   50,000 Units at 04/02/19 0919    ertapenem (INVANZ) 1 g in sodium chloride 0.9 % 100 mL IVPB (ready to mix system)  1 g Intravenous Q24H Cammie Kessler  mL/hr at 04/03/19 1949 1 g at 04/03/19 1949    famotidine tablet 20 mg  20 mg Oral BID Bogdan Pineda MD   20 mg at 04/04/19 0914    glucagon (human recombinant) injection 1 mg  1 mg Intramuscular PRN Bogdan Pineda MD        glucose chewable tablet 16 g  16 g Oral PRN Bogdan Pineda MD        glucose chewable tablet 24 g  24 g Oral PRN Bogdan  MD Miriam        insulin aspart U-100 pen 1-10 Units  1-10 Units Subcutaneous QID (AC + HS) PRN Bogdan Pineda MD   2 Units at 04/04/19 1119    insulin detemir U-100 pen 20 Units  20 Units Subcutaneous QHS Bogdan Pineda MD   20 Units at 04/03/19 2209    INV acyclovir/placebo capsule 400 mg  400 mg Oral Q12H Bogdan Pineda MD   400 mg at 04/04/19 0917    INV MK-8228/placebo 480 mg oral tablet 480 mg  480 mg Oral Daily Bogdan Pineda MD   480 mg at 04/04/19 0917    INV valganciclovir/placebo tablet 900 mg  900 mg Oral Daily Bogdan Pineda MD   900 mg at 04/04/19 0917    levothyroxine tablet 75 mcg  75 mcg Oral Before breakfast Bogdan Pineda MD   75 mcg at 04/04/19 0627    LORazepam tablet 1 mg  1 mg Oral BID Bogdan Pineda MD   1 mg at 04/04/19 0915    multivitamin tablet 1 tablet  1 tablet Oral Daily Bogdan Pineda MD   1 tablet at 04/04/19 0914    ondansetron injection 4 mg  4 mg Intravenous Q6H PRN Bogdan Pineda MD        sodium chloride 0.9% flush 3 mL  3 mL Intravenous Q8H Bogdan Pineda MD   3 mL at 04/04/19 0627    tacrolimus capsule 1 mg  1 mg Oral Daily Ginny West PA-C        [START ON 4/5/2019] tacrolimus capsule 2 mg  2 mg Oral Daily Ginny West PA-C        vilazodone tablet 40 mg  40 mg Oral Daily Bogdan Pineda MD   40 mg at 04/04/19 1035       Review of Systems   Constitutional: Negative for activity change, chills and fever.   HENT: Negative for sinus pressure and trouble swallowing.    Eyes: Negative for pain and visual disturbance.   Respiratory: Negative for cough, chest tightness and shortness of breath.    Cardiovascular: Negative for chest pain and palpitations.   Gastrointestinal: Negative for abdominal pain, diarrhea, nausea and vomiting.   Genitourinary: Negative for difficulty urinating and dysuria.   Musculoskeletal: Positive for back pain. Negative for neck pain.   Skin: Negative for rash and wound.   Neurological: Positive for weakness. Negative for numbness and  headaches.   Psychiatric/Behavioral: Negative for agitation and confusion. The patient is nervous/anxious.      Objective:     Vital Signs (Most Recent):  Temp: 97.8 °F (36.6 °C) (04/04/19 1202)  Pulse: 85 (04/04/19 1202)  Resp: 18 (04/04/19 1202)  BP: 136/85 (04/04/19 1202)  SpO2: 97 % (04/04/19 1202) Vital Signs (24h Range):  Temp:  [97.8 °F (36.6 °C)-98.4 °F (36.9 °C)] 97.8 °F (36.6 °C)  Pulse:  [60-85] 85  Resp:  [16-18] 18  SpO2:  [92 %-97 %] 97 %  BP: (124-147)/(58-85) 136/85     Weight: 93.2 kg (205 lb 7.5 oz)  Body mass index is 32.18 kg/m².    Physical Exam   Constitutional: She is oriented to person, place, and time. She appears well-developed and well-nourished.   HENT:   Head: Normocephalic and atraumatic.   Eyes: Pupils are equal, round, and reactive to light. EOM are normal.   Cardiovascular: Normal rate.   Pulmonary/Chest: Effort normal. No respiratory distress.   Abdominal: Soft. She exhibits no distension.   Musculoskeletal: She exhibits tenderness (lower back).   Neurological: She is alert and oriented to person, place, and time.   Reflex Scores:       Bicep reflexes are 1+ on the right side and 1+ on the left side.       Brachioradialis reflexes are 1+ on the right side and 1+ on the left side.       Patellar reflexes are 1+ on the right side and 0 on the left side.       Achilles reflexes are 1+ on the right side and 1+ on the left side.  Skin: Skin is warm and dry.   Psychiatric: She has a normal mood and affect. Her speech is normal and behavior is normal.   Nursing note and vitals reviewed.      NEUROLOGICAL EXAMINATION:     MENTAL STATUS   Oriented to person, place, and time.   Attention: normal. Concentration: normal.   Speech: speech is normal   Knowledge: good.     CRANIAL NERVES     CN II   Visual fields full to confrontation.     CN III, IV, VI   Pupils are equal, round, and reactive to light.  Extraocular motions are normal.   Right pupil: Shape: regular. Reactivity: brisk. Consensual  response: intact.   Left pupil: Shape: regular. Reactivity: brisk. Consensual response: intact.   CN III: no CN III palsy  CN VI: no CN VI palsy  Conjugate gaze: present    CN V   Facial sensation intact.     CN VII   Facial expression full, symmetric.     CN VIII   Hearing: intact    CN XI   Right sternocleidomastoid strength: normal  Left sternocleidomastoid strength: normal  Right trapezius strength: normal  Left trapezius strength: normal    CN XII   CN XII normal.   Tongue: not atrophic  Tongue deviation: none    MOTOR EXAM   Muscle bulk: normal  Overall muscle tone: normal    Strength   Strength 5/5 except as noted.   Right iliopsoas: 4/5  Left iliopsoas: 4/5  Right quadriceps: 4/5  Left quadriceps: 4/5  Right hamstrin/5  Left hamstrin/5       Power exam partially limited by patient effort 2/2 back pain     REFLEXES     Reflexes   Right brachioradialis: 1+  Left brachioradialis: 1+  Right biceps: 1+  Left biceps: 1+  Right patellar: 1+  Left patellar: 0  Right achilles: 1+  Left achilles: 1+    SENSORY EXAM   Light touch normal.       Significant Labs:   CBC:   Recent Labs   Lab 19  0359 19  0402   WBC 3.27* 2.63*   HGB 7.9* 8.5*   HCT 24.0* 27.1*    183     CMP:   Recent Labs   Lab 19  0359 19  0402   * 147*    141   K 4.6 4.5    108   CO2 21* 23   BUN 13 13   CREATININE 1.0 1.0   CALCIUM 8.8 9.2   MG 1.9 1.7   PROT 6.4 6.6   ALBUMIN 3.3* 3.5   BILITOT 0.4 0.4   ALKPHOS 128 133   AST 35 28   ALT 31 30   ANIONGAP 11 10   EGFRNONAA >60.0 >60.0     All pertinent lab results from the past 24 hours have been reviewed.    Significant Imaging: I have reviewed all pertinent imaging results/findings within the past 24 hours.     CT Abdomen Pelvis With Contrast 4/3/19:    Degenerative/postsurgical changes are noted of the spine.      MRI Lumbar Spine Without Contrast 19:  FINDINGS:  There are postop changes of left-sided laminectomies extending from L3  through L5.  The lumbar alignment is within normal limits.  The vertebral body heights are maintained.  The bone marrow signal is within normal limits.    The conus terminates at the level of L1.  There is mild thickening of the cauda equina nerve roots.  There is no definitive mass effect in the spinal canal.    There are Modic type II endplate changes involving the L3-L4 and L4-L5 levels.  There is mild disc desiccation at multiple levels.  Evaluation of the individual disc levels reveals the following:    T12-L1, normal.    L1-L2, there is diffuse disc bulge along with facet hypertrophy and ligamentum flavum hypertrophy.  There is superimposed central disc protrusion.  There is moderate narrowing of the spinal canal.  The neural foramina are unremarkable.    L2-L3, there is diffuse disc bulge along with facet hypertrophy and ligamentum flavum hypertrophy.  The spinal canal is within normal limits.  There is mild bilateral neural foraminal narrowing.    L3-L4, there is a left-sided postlaminectomy changes at this level.  There is diffuse disc bulge along with facet hypertrophy and ligamentum flavum hypertrophy.  There is small amount of soft tissue along the central aspect of the spinal canal adjacent to the disc.  The spinal canal is within normal limits.  There is mild bilateral neural foraminal narrowing.    L4-5, there is a left-sided post laminectomy changes at this level.  There is diffuse disc bulge along with facet hypertrophy and ligamentum flavum hypertrophy.  There is small amount of soft tissue along the central aspect of the spinal canal adjacent to the disc.  The spinal canal is within normal limits.  There is mild bilateral neural foraminal narrowing.    L5-S1, there is a left-sided post laminectomy changes at this level.  There is diffuse disc bulge along with facet hypertrophy and ligamentum flavum hypertrophy.  The spinal canal is within normal limits.  There is mild bilateral neural foraminal  narrowing.    There are postoperative changes involving the paraspinous soft tissues.  There are simple appearing bilateral renal cysts.  Both kidneys appear atrophic.  The remainder of the retroperitoneal structures are within normal limits.      Impression       No MR evidence of discitis osteomyelitis in the lumbar spine.    Postoperative changes of prior laminectomies at the L3, L4, and L5 levels.  Soft tissue located within the spinal canal adjacent to the disc.  The findings may represent a recurrent or residual disc.  Postcontrast imaging may be obtained for further evaluation.    Central disc protrusion at the L1-L2 level with associated moderate narrowing of the spinal canal.    Thickening of the cauda equina nerve roots.  Some of this may be secondary arachnoiditis.    Bilateral atrophic kidneys, most suggestive of end-stage renal disease.         Assessment and Plan:     Leg weakness  Bilateral leg weakness with exertion since transplant. With her history of multiple spinal surgeries, there is likely a component of neurogenic claudication. Other differentials include a diabetic lumbosacral plexopathy, or compressive plexopathy due to the fluid collection. MRI L spine WO contrast from 4/4/19 shows mild disc desiccation at multiple levels, most notably at L1-L2, as well as neural foraminal narrowing.     Recommendations:  -- Pain control  -- Continue Physical therapy  -- Limit prolonged use of scheduled BZDs    Kidney transplant recipient  -- US with fluid collection below surgical incision, redemonstrated by CT A/P  -- management per primary        VTE Risk Mitigation (From admission, onward)        Ordered     Place EDUARDO hose  Until discontinued      04/02/19 0149     Place sequential compression device  Until discontinued      04/02/19 0149     IP VTE HIGH RISK PATIENT  Once      04/02/19 0149          Paulino Gross MD  Neurology  [facility]

## 2019-04-04 NOTE — PLAN OF CARE
Problem: Adult Inpatient Plan of Care  Goal: Plan of Care Review  Outcome: Ongoing (interventions implemented as appropriate)  Report from Rodrigo ORTEZ. Noted patient in bed awake, alert, and oriented. Denies pain and dyspnea. Denies questions. Board updated. Bed in lowest position and call light within reach. Patient encouraged to notify staff when assistance is needed. Assessments per flow sheets. Will continue to monitor.

## 2019-04-04 NOTE — ASSESSMENT & PLAN NOTE
- a/w temp 100.7. Tylenol PRN for relief  - Infectious w/up initiated. Transitioned Cefepime to Ertapenem per ID recs  - Viral PCR negative  - afebrile

## 2019-04-04 NOTE — PROGRESS NOTES
[facility]  Kidney Transplant  Progress Note      Reason for Follow-up: Reassessment of Kidney Transplant - 1/14/2019  (#1) recipient and management of immunosuppression.    ORGAN: LEFT KIDNEY    Donor Type: Living    Donor CMV Status:   Donor HBcAB:  Donor HCV Status:  Donor HBV DERRICK:   Donor HCV DERRICK:       Subjective:   History of Present Illness:  Patient is a 59 y.o. female female who received a living kidney transplant on 1/14/19. Post operative course has been complicated by recurrent UTI with K pneumo, currently treating with 10 day course of Cipro, completed 4 days. She reports fatigue over the past 2 or so weeks, often staying in bed through the morning and taking naps in the afternoon. She began having diarrhea over the past month or so also, this has been worked up and was negative for C dif on 3/6 and 3/13. She presented to the ED 4/1 due to fevers which steadily increased to a tmax of 101.2, she denies recent fevers prior to this, even with previous UTI. Reports mild nausea without emesis. She denies dark urine, edema, abdominal pain, or decreased UOP.  Admit to [facility] for further work-up.    Hospital Course:  Admitted for fevers and neutropenia, reports having chills x 1 day. Viral PCR pending. CMV PCR 4/2 pending. A dose of zosyn in ED on admit, transitioned to Cefepime 4/2 for ongoing UTI. ID consulted, with recs to complete 14 day course of Ertapenem for presumed pyelo, finishing on 4/15. Kidney US 4/2 showed a fluid collection inferior to the surgical incision measuring 14.0 x 1.2 x 3.1 cm, mildly decreased in size from prior measurement of 12.7 x 1.9 x 6.6 cm. Renal fxn stable. CT scan showing small amt of fluid along incision line, reviewed with surgeon. Not amenable for draining at this time. Pt reports multiple bouts of diarrhea. Stool studies pending. CMV undetected. Viral panel negative. Neurology consulted for weakness on ambulation. Clinical description suggestive of  neurogenic claudication related to 3 prior lumbar spine surgeries. Per neuro recs, will obtain MRI lumbar spine. VSS. Cont to monitor        Ms. Newell is a 59 y.o. year old female who is status post Kidney Transplant - 1/14/2019  (#1).    Her maintenance immunosuppression consists of:   Immunosuppressants (From admission, onward)    Start     Stop Route Frequency Ordered    04/05/19 0800  tacrolimus capsule 2 mg      -- Oral Daily 04/04/19 1127    04/04/19 1800  tacrolimus capsule 1 mg      -- Oral Daily 04/04/19 1127          Hospital Course:  No notes on file    Past Medical, Surgical, Family, and Social History:   Unchanged from H&P.    Scheduled Meds:   atorvastatin  40 mg Oral Daily    atovaquone  1,500 mg Oral Daily    ergocalciferol  50,000 Units Oral Q7 Days    ertapenem (INVANZ) IVPB  1 g Intravenous Q24H    famotidine  20 mg Oral BID    insulin detemir U-100  20 Units Subcutaneous QHS    INV acyclovir/placebo  400 mg Oral Q12H    INV MK-8228/placebo  480 mg Oral Daily    INV valganciclovir/placebo  900 mg Oral Daily    levothyroxine  75 mcg Oral Before breakfast    LORazepam  1 mg Oral BID    multivitamin  1 tablet Oral Daily    sodium chloride 0.9%  3 mL Intravenous Q8H    tacrolimus  1 mg Oral Daily    [START ON 4/5/2019] tacrolimus  2 mg Oral Daily    vilazodone  40 mg Oral Daily     Continuous Infusions:  PRN Meds:acetaminophen, dextrose 50%, dextrose 50%, glucagon (human recombinant), glucose, glucose, insulin aspart U-100, ondansetron    Intake/Output - Last 3 Shifts       04/02 0700 - 04/03 0659 04/03 0700 - 04/04 0659 04/04 0700 - 04/05 0659    P.O.  275     I.V. (mL/kg) 53 (0.5)      IV Piggyback  200     Total Intake(mL/kg) 53 (0.5) 475 (5.1)     Urine (mL/kg/hr)  700 (0.3) 400 (0.5)    Stool  0     Total Output  700 400    Net +53 -225 -400           Stool Occurrence  0 x            Review of Systems   Constitutional: Positive for activity change, appetite change and fatigue.  "Negative for chills and fever.   Respiratory: Negative for cough and shortness of breath.    Cardiovascular: Negative for chest pain and leg swelling.   Gastrointestinal: Positive for diarrhea. Negative for abdominal distention, abdominal pain, constipation, nausea and vomiting.   Genitourinary: Negative for decreased urine volume, difficulty urinating and dysuria.   Musculoskeletal: Positive for gait problem.   Allergic/Immunologic: Positive for immunocompromised state.   Neurological: Positive for weakness (LE). Negative for headaches.   Psychiatric/Behavioral: Negative for agitation and behavioral problems.      Objective:     Vital Signs (Most Recent):  Temp: 97.8 °F (36.6 °C) (04/04/19 1202)  Pulse: 85 (04/04/19 1202)  Resp: 18 (04/04/19 1202)  BP: 136/85 (04/04/19 1202)  SpO2: 97 % (04/04/19 1202) Vital Signs (24h Range):  Temp:  [97.8 °F (36.6 °C)-98.4 °F (36.9 °C)] 97.8 °F (36.6 °C)  Pulse:  [60-85] 85  Resp:  [16-18] 18  SpO2:  [92 %-97 %] 97 %  BP: (124-147)/(58-85) 136/85     Weight: 93.2 kg (205 lb 7.5 oz)  Height: 5' 7" (170.2 cm)  Body mass index is 32.18 kg/m².    Physical Exam   Constitutional: She is oriented to person, place, and time. She appears well-developed.   Eyes: Pupils are equal, round, and reactive to light. EOM are normal. No scleral icterus.   Neck: Normal range of motion.   Cardiovascular: Normal rate, regular rhythm and normal heart sounds.   No murmur heard.  Pulmonary/Chest: Effort normal and breath sounds normal. No respiratory distress. She has no wheezes.   Abdominal: Soft. Bowel sounds are normal. She exhibits no distension. There is no tenderness. There is no guarding.   Musculoskeletal: Normal range of motion.   Neurological: She is alert and oriented to person, place, and time.   Skin: Skin is warm and dry.   Nursing note and vitals reviewed.      Laboratory:  CBC:   Recent Labs   Lab 04/02/19  1443 04/03/19  0359 04/04/19  0402   WBC 2.64* 3.27* 2.63*   RBC 2.37* 2.58* 2.83* "   HGB 7.3* 7.9* 8.5*   HCT 22.2* 24.0* 27.1*    186 183   MCV 94 93 96   MCH 30.8 30.6 30.0   MCHC 32.9 32.9 31.4*     CMP:   Recent Labs   Lab 04/02/19  0552 04/03/19  0359 04/04/19  0402   * 163* 147*   CALCIUM 8.8 8.8 9.2   ALBUMIN 3.3* 3.3* 3.5   PROT 6.1 6.4 6.6    139 141   K 4.2 4.6 4.5   CO2 26 21* 23    107 108   BUN 14 13 13   CREATININE 1.2 1.0 1.0   ALKPHOS 139* 128 133   ALT 27 31 30   AST 23 35 28     Labs within the past 24 hours have been reviewed.    Diagnostic Results:  MRI results pending    Assessment/Plan:     * Fever  - a/w temp 100.7. Tylenol PRN for relief  - Infectious w/up initiated. Transitioned Cefepime to Ertapenem per ID recs  - Viral PCR negative  - afebrile    Diarrhea  - stool studies pending.       Leg weakness  - Neuro consulted. Appreciate recs  - MRI lumbar spine 4/4.      UTI (urinary tract infection)  - Post-op w/ recurrent Kleb pneumo UTI (1/30, 2/11, 2/25, 3/26)  - recently on Cipro, completed 4/10 day course. Rceived dose of zosyn in ED yesterday. Transitioned to Cefepime 4/2.   - Infectious w/u initiated. ID consulted.   - Blood/urine cx NGTD.  - Cefepime transitioned to Ertapenem 4/3 per ID recs-- to complete 14 day course on 4/15      Kidney transplant recipient  - S/p Living kidney transplant 2/2 DM/HTN on 1/14. Hx recurrent Kleb pneumo UTI (1/30, 2/11, 2/25, 3/26)  - Kidney US 4/2: collection inferior to the surgical incision measuring 14.0 x 1.2 x 3.1 cm, mildly decreased in size from prior measurement of 12.7 x 1.9 x 6.6 cm.  - Kidney fxn stable  - CT abd/pelvis w/ small fluid collection along incision line reviewed by surgeon. No need to drain at this time          Hypomagnesemia  - Replace PRN. Monitor      Acute on chronic anemia  - H/H stable. Monitor        Vitamin D deficiency  - cont ergo weekly      At risk for opportunistic infections  - Continue atovaquone for PCP prophylaxis   - part of CMV study.   - CMV PCR 4/2  undetected        Long-term use of immunosuppressant medication  - Continue Prograf. Will monitor for signs of toxic side effects, check daily tacrolimus troughs, and change meds accordingly  - cellcept on hold for leukopenia              Discharge Planning: tentative weekend d/c pending symptom improvement    Ginny eWst PA-C  Kidney Transplant  [facility]

## 2019-04-05 VITALS
HEIGHT: 67 IN | DIASTOLIC BLOOD PRESSURE: 76 MMHG | WEIGHT: 205.5 LBS | SYSTOLIC BLOOD PRESSURE: 128 MMHG | RESPIRATION RATE: 20 BRPM | HEART RATE: 74 BPM | TEMPERATURE: 98 F | BODY MASS INDEX: 32.25 KG/M2 | OXYGEN SATURATION: 100 %

## 2019-04-05 PROBLEM — E83.42 HYPOMAGNESEMIA: Status: RESOLVED | Noted: 2019-02-12 | Resolved: 2019-04-05

## 2019-04-05 PROBLEM — R50.9 FEVER: Status: RESOLVED | Noted: 2019-04-02 | Resolved: 2019-04-05

## 2019-04-05 PROBLEM — M48.062 LUMBAR STENOSIS WITH NEUROGENIC CLAUDICATION: Status: ACTIVE | Noted: 2019-04-05

## 2019-04-05 LAB
ALBUMIN SERPL BCP-MCNC: 3.6 G/DL (ref 3.5–5.2)
ALP SERPL-CCNC: 124 U/L (ref 55–135)
ALT SERPL W/O P-5'-P-CCNC: 29 U/L (ref 10–44)
ANION GAP SERPL CALC-SCNC: 11 MMOL/L (ref 8–16)
AST SERPL-CCNC: 29 U/L (ref 10–40)
BASOPHILS # BLD AUTO: 0.01 K/UL (ref 0–0.2)
BASOPHILS NFR BLD: 0.5 % (ref 0–1.9)
BILIRUB SERPL-MCNC: 0.7 MG/DL (ref 0.1–1)
BUN SERPL-MCNC: 14 MG/DL (ref 8–23)
CALCIUM SERPL-MCNC: 9 MG/DL (ref 8.7–10.5)
CHLORIDE SERPL-SCNC: 105 MMOL/L (ref 95–110)
CO2 SERPL-SCNC: 22 MMOL/L (ref 23–29)
CREAT SERPL-MCNC: 1 MG/DL (ref 0.5–1.4)
CRYPTOSP AG STL QL IA: NEGATIVE
DIFFERENTIAL METHOD: ABNORMAL
EOSINOPHIL # BLD AUTO: 0 K/UL (ref 0–0.5)
EOSINOPHIL NFR BLD: 0.5 % (ref 0–8)
ERYTHROCYTE [DISTWIDTH] IN BLOOD BY AUTOMATED COUNT: 15.9 % (ref 11.5–14.5)
EST. GFR  (AFRICAN AMERICAN): >60 ML/MIN/1.73 M^2
EST. GFR  (NON AFRICAN AMERICAN): >60 ML/MIN/1.73 M^2
FAT STL SUDAN IV STN: NORMAL
G LAMBLIA AG STL QL IA: NEGATIVE
GLUCOSE SERPL-MCNC: 143 MG/DL (ref 70–110)
HCT VFR BLD AUTO: 26.8 % (ref 37–48.5)
HGB BLD-MCNC: 8.5 G/DL (ref 12–16)
IMM GRANULOCYTES # BLD AUTO: 0.02 K/UL (ref 0–0.04)
IMM GRANULOCYTES NFR BLD AUTO: 1 % (ref 0–0.5)
LYMPHOCYTES # BLD AUTO: 0.2 K/UL (ref 1–4.8)
LYMPHOCYTES NFR BLD: 11.5 % (ref 18–48)
MAGNESIUM SERPL-MCNC: 1.7 MG/DL (ref 1.6–2.6)
MCH RBC QN AUTO: 30.5 PG (ref 27–31)
MCHC RBC AUTO-ENTMCNC: 31.7 G/DL (ref 32–36)
MCV RBC AUTO: 96 FL (ref 82–98)
MONOCYTES # BLD AUTO: 0.4 K/UL (ref 0.3–1)
MONOCYTES NFR BLD: 18.8 % (ref 4–15)
NEUTROPHILS # BLD AUTO: 1.4 K/UL (ref 1.8–7.7)
NEUTROPHILS NFR BLD: 67.7 % (ref 38–73)
NRBC BLD-RTO: 0 /100 WBC
O+P STL TRI STN: NORMAL
PHOSPHATE SERPL-MCNC: 3.4 MG/DL (ref 2.7–4.5)
PLATELET # BLD AUTO: 180 K/UL (ref 150–350)
PMV BLD AUTO: 10.1 FL (ref 9.2–12.9)
POCT GLUCOSE: 151 MG/DL (ref 70–110)
POCT GLUCOSE: 180 MG/DL (ref 70–110)
POTASSIUM SERPL-SCNC: 4.4 MMOL/L (ref 3.5–5.1)
PROT SERPL-MCNC: 6.8 G/DL (ref 6–8.4)
RBC # BLD AUTO: 2.79 M/UL (ref 4–5.4)
RV AG STL QL IA.RAPID: NEGATIVE
SODIUM SERPL-SCNC: 138 MMOL/L (ref 136–145)
TACROLIMUS BLD-MCNC: 6.7 NG/ML (ref 5–15)
WBC # BLD AUTO: 2.08 K/UL (ref 3.9–12.7)

## 2019-04-05 PROCEDURE — 94761 N-INVAS EAR/PLS OXIMETRY MLT: CPT

## 2019-04-05 PROCEDURE — 94640 AIRWAY INHALATION TREATMENT: CPT

## 2019-04-05 PROCEDURE — 80053 COMPREHEN METABOLIC PANEL: CPT

## 2019-04-05 PROCEDURE — 36410 VNPNXR 3YR/> PHY/QHP DX/THER: CPT

## 2019-04-05 PROCEDURE — 99239 PR HOSPITAL DISCHARGE DAY,>30 MIN: ICD-10-PCS | Mod: 24,,, | Performed by: PHYSICIAN ASSISTANT

## 2019-04-05 PROCEDURE — 76937 US GUIDE VASCULAR ACCESS: CPT

## 2019-04-05 PROCEDURE — 25000003 PHARM REV CODE 250: Performed by: STUDENT IN AN ORGANIZED HEALTH CARE EDUCATION/TRAINING PROGRAM

## 2019-04-05 PROCEDURE — 36415 COLL VENOUS BLD VENIPUNCTURE: CPT

## 2019-04-05 PROCEDURE — 80197 ASSAY OF TACROLIMUS: CPT

## 2019-04-05 PROCEDURE — 85025 COMPLETE CBC W/AUTO DIFF WBC: CPT

## 2019-04-05 PROCEDURE — 83735 ASSAY OF MAGNESIUM: CPT

## 2019-04-05 PROCEDURE — 25000242 PHARM REV CODE 250 ALT 637 W/ HCPCS: Performed by: PHYSICIAN ASSISTANT

## 2019-04-05 PROCEDURE — 99239 HOSP IP/OBS DSCHRG MGMT >30: CPT | Mod: 24,,, | Performed by: PHYSICIAN ASSISTANT

## 2019-04-05 PROCEDURE — 84100 ASSAY OF PHOSPHORUS: CPT

## 2019-04-05 PROCEDURE — A4216 STERILE WATER/SALINE, 10 ML: HCPCS | Performed by: STUDENT IN AN ORGANIZED HEALTH CARE EDUCATION/TRAINING PROGRAM

## 2019-04-05 PROCEDURE — C1751 CATH, INF, PER/CENT/MIDLINE: HCPCS

## 2019-04-05 PROCEDURE — 97161 PT EVAL LOW COMPLEX 20 MIN: CPT

## 2019-04-05 PROCEDURE — 63600175 PHARM REV CODE 636 W HCPCS: Performed by: PHYSICIAN ASSISTANT

## 2019-04-05 PROCEDURE — 99233 PR SUBSEQUENT HOSPITAL CARE,LEVL III: ICD-10-PCS | Mod: GC,,, | Performed by: PSYCHIATRY & NEUROLOGY

## 2019-04-05 PROCEDURE — 99233 SBSQ HOSP IP/OBS HIGH 50: CPT | Mod: GC,,, | Performed by: PSYCHIATRY & NEUROLOGY

## 2019-04-05 RX ORDER — ROSUVASTATIN CALCIUM 10 MG/1
10 TABLET, COATED ORAL NIGHTLY
Status: DISCONTINUED | OUTPATIENT
Start: 2019-04-05 | End: 2019-04-05 | Stop reason: HOSPADM

## 2019-04-05 RX ORDER — INSULIN ASPART 100 [IU]/ML
INJECTION, SOLUTION INTRAVENOUS; SUBCUTANEOUS
Qty: 15 ML | Refills: 12 | Status: ON HOLD
Start: 2019-04-05 | End: 2019-05-10 | Stop reason: SDUPTHER

## 2019-04-05 RX ORDER — PENTAMIDINE ISETHIONATE 300 MG/300MG
300 INHALANT RESPIRATORY (INHALATION) ONCE
Status: COMPLETED | OUTPATIENT
Start: 2019-04-05 | End: 2019-04-05

## 2019-04-05 RX ORDER — TACROLIMUS 0.5 MG/1
CAPSULE ORAL
Qty: 240 CAPSULE | Refills: 11 | Status: SHIPPED | OUTPATIENT
Start: 2019-04-05 | End: 2019-04-10 | Stop reason: SDUPTHER

## 2019-04-05 RX ORDER — ALBUTEROL SULFATE 2.5 MG/.5ML
2.5 SOLUTION RESPIRATORY (INHALATION) ONCE
Status: COMPLETED | OUTPATIENT
Start: 2019-04-05 | End: 2019-04-05

## 2019-04-05 RX ADMIN — VILAZODONE HYDROCHLORIDE 40 MG: 40 TABLET ORAL at 09:04

## 2019-04-05 RX ADMIN — ERTAPENEM 1 G: 1 INJECTION INTRAMUSCULAR; INTRAVENOUS at 03:04

## 2019-04-05 RX ADMIN — PENTAMIDINE ISETHIONATE 300 MG: 300 INHALANT RESPIRATORY (INHALATION) at 03:04

## 2019-04-05 RX ADMIN — Medication 3 ML: at 02:04

## 2019-04-05 RX ADMIN — LORAZEPAM 1 MG: 1 TABLET ORAL at 09:04

## 2019-04-05 RX ADMIN — TACROLIMUS 2 MG: 1 CAPSULE ORAL at 09:04

## 2019-04-05 RX ADMIN — FAMOTIDINE 20 MG: 20 TABLET ORAL at 09:04

## 2019-04-05 RX ADMIN — LEVOTHYROXINE SODIUM 75 MCG: 75 TABLET ORAL at 06:04

## 2019-04-05 RX ADMIN — Medication 3 ML: at 06:04

## 2019-04-05 RX ADMIN — ALBUTEROL SULFATE 2.5 MG: 2.5 SOLUTION RESPIRATORY (INHALATION) at 03:04

## 2019-04-05 RX ADMIN — INSULIN ASPART 2 UNITS: 100 INJECTION, SOLUTION INTRAVENOUS; SUBCUTANEOUS at 12:04

## 2019-04-05 RX ADMIN — THERA TABS 1 TABLET: TAB at 09:04

## 2019-04-05 NOTE — PT/OT/SLP EVAL
Physical Therapy Evaluation and Discharge Note    Patient Name:  Andra Newell   MRN:  [id number]    Recommendations:     Discharge Recommendations:  (home)   Discharge Equipment Recommendations: (rollator )   Barriers to discharge: None    Assessment:     Andra Newell is a 59 y.o. female admitted with a medical diagnosis of Fever. .  At this time, patient is functioning at their prior level of function and does not require further acute PT services.     Recent Surgery: * No surgery found *      Plan:     During this hospitalization, patient does not require further acute PT services.  Please re-consult if situation changes.      Subjective     Chief Complaint: pain in back and weakness in LE with extended gait   Patient/Family Comments/goals: to return home  Pain/Comfort:  · Pain Rating 1: (chronic back pain; not rated)    Patients cultural, spiritual, Jehovah's witness conflicts given the current situation: no    Living Environment:  Pt lives in a [facility] with her partner.   Prior to admission, patients level of function was limited community distances and mod ( I) with ambulation in the home. Pt  ( I ) with self care ADLs.  Equipment used at home: walker, rolling, cane, straight, power chair.  DME owned (not currently used): none.  Upon discharge, patient will have assistance from her friend who lives near by.    Objective:     Communicated with RN  prior to session.  Patient found supine with (no active lines) upon PT entry to room.    General Precautions: Standard, fall   Orthopedic Precautions:N/A   Braces: N/A     Exams:  · Cognitive Exam:  Patient is oriented to Person, Place, Time and Situation  · Fine Motor Coordination: -       Intact  · Gross Motor Coordination:  WFL  · Postural Exam:  Patient presented with the following abnormalities: -       Rounded shoulders  · -       Forward head  · -       Kyphosis  · Sensation: -       Intact  · Skin Integrity/Edema:  -       Skin integrity: Visible skin  intact  · RLE ROM: WFL  · RLE Strength: WFL  · LLE ROM: WFL  · LLE Strength: WFL    Functional Mobility:  · Bed Mobility:  Rolling Left:  independence  · Supine to Sit: independence  · Sit to Supine: independence  · Transfers:  Sit to Stand:  supervision with no AD  · Gait: x 90 feet x 2 trials with supervision with no AD. no increased in sway or LOB  · Balance: supervision for gait with no AD    AM-PAC 6 CLICK MOBILITY  Total Score:24       Therapeutic Activities and Exercises:     Patient education  · Patient educated on the role of PT and POC  · Patient educated on importance  activity while in the hosptial per tolerance for improved endurance and to limit deconditioning   · Patient educated on safe transfers with nursing as appropriate  · Patient educated on proper transfer mechanics and safety  · All of patients questions were answered within the scope of PT        AM-PAC 6 CLICK MOBILITY  Total Score:24     Patient left sitting EOB  with all lines intact and call button in reach.    GOALS:   Multidisciplinary Problems     Physical Therapy Goals        Problem: Physical Therapy Goal    Goal Priority Disciplines Outcome Goal Variances Interventions   Physical Therapy Goal     PT, PT/OT Ongoing (interventions implemented as appropriate)                     History:     Past Medical History:   Diagnosis Date    Anemia     Anemia in chronic kidney disease, on chronic dialysis 7/12/2017    Arthritis     Asthma     allergic airway    CKD stage III (moderate) 2/18/2019    Colon polyp     Depression     Diabetes mellitus     Diverticulosis     Eosinophilia     ESRD (end stage renal disease)     stage V, due for living donor 9/2018    ESRD on dialysis     GERD (gastroesophageal reflux disease)     Gout     Gout     Hyperlipidemia     Hypertension     Low back pain     MRSA carrier     Obesity     Renal disorder     Rotator cuff syndrome--left     Thyroid disease     Ulnar neuropathy of right  upper extremity     Uncontrolled type 2 diabetes mellitus with hyperglycemia        Past Surgical History:   Procedure Laterality Date    ACHILLES TENDON SURGERY Left May 2015    ARTHROSCOPY, HIP Left 10/3/2013    [facility] at [facility]    ARTHROSCOPY-HIP WITH TROCHANTERIC BURSECTOMY Left 10/3/2013    [facility] at [facility]    ARTHROSCOPY-SHOULDER Left 11/24/2015    [facility] at [facility]    ARTHROSCOPY-SHOULDER WITH SUBACROMIAL DECOMPRESSION Left 7/12/2016    Performed by Meo [facility]dez [facility] at [facility] OR    BACK SURGERY      CHOLECYSTECTOMY      COLONOSCOPY N/A 9/6/2017    Performed by [facility] at [facility]    DEBRIDEMENT-SHOULDER-ARTHROSCOPIC Left 7/12/2016    [facility] at [facility] OR    DEBRIDEMENT-SHOULDER-ARTHROSCOPIC; LABRAL Left 11/24/2015    [facility] at [facility] OR    DIALYSIS FISTULA CREATION  12/2017    FEMOROPLASTY, ARTHROSCOPIC Left 10/3/2013    [facility] at [facility] OR    HEMORRHOID SURGERY      INJECTION-AMNIOX Left 7/12/2016    [facility] at [facility]    JOINT REPLACEMENT      left    KNEE SURGERY      LYSIS-ADHESION Left 11/24/2015    [facility] at [facility]    REPAIR ROTATOR CUFF ARTHROSCOPIC Left 7/12/2016    [facility] at [facility] OR    REPAIR-TENDON-BICEP Left 11/24/2015    [facility] at [facility] OR    SHOULDER ARTHROSCOPY      SHOULDER SURGERY      TRANSPLANT, KIDNEY N/A 1/14/2019    [facility]    UPPER GASTROINTESTINAL ENDOSCOPY  ~2013     Kirt       Time Tracking:     PT Received On: 04/05/19  PT Start Time: 0918     PT Stop Time: 0934  PT Total Time (min): 16 min     Billable Minutes: Evaluation 16 min      Parker Montanez, PT  04/05/2019

## 2019-04-05 NOTE — SUBJECTIVE & OBJECTIVE
Subjective:     Interval History: No acute events overnight. Remains afebrile. On contact precautions while ruling out c.diff. Still has pain with ambulation, unchanged from yesterday.  MRI with sedation obtained yesterday afternoon.    Current Neurological Medications:   Lorazepam 1g PO BID  Vilazodone 50mg qD    Current Facility-Administered Medications   Medication Dose Route Frequency Provider Last Rate Last Dose    acetaminophen tablet 650 mg  650 mg Oral Q4H PRN Bogdan Pineda MD        albuterol sulfate nebulizer solution 2.5 mg  2.5 mg Nebulization Once Ginny West PA-C        atovaquone suspension 1,500 mg  1,500 mg Oral Daily Bogdan Pineda MD        dextrose 50% injection 12.5 g  12.5 g Intravenous PRN Bogdan Pineda MD        dextrose 50% injection 25 g  25 g Intravenous PRN Bogdan Pineda MD        ergocalciferol capsule 50,000 Units  50,000 Units Oral Q7 Days Bogdan Pineda MD   50,000 Units at 04/02/19 0919    ertapenem (INVANZ) 1 g in sodium chloride 0.9 % 100 mL IVPB (ready to mix system)  1 g Intravenous Q24H Cammie Kessler  mL/hr at 04/04/19 1931 1 g at 04/04/19 1931    famotidine tablet 20 mg  20 mg Oral BID Bogdan Pineda MD   20 mg at 04/05/19 0918    glucagon (human recombinant) injection 1 mg  1 mg Intramuscular PRN Bogdan Pineda MD        glucose chewable tablet 16 g  16 g Oral PRN Bogdan Pineda MD        glucose chewable tablet 24 g  24 g Oral PRN Bogdan Pineda MD        insulin aspart U-100 pen 1-10 Units  1-10 Units Subcutaneous QID (AC + HS) PRN Bogdan Pineda MD   2 Units at 04/05/19 1256    insulin detemir U-100 pen 20 Units  20 Units Subcutaneous QHS Bogdan Pineda MD   20 Units at 04/04/19 2109    INV acyclovir/placebo capsule 400 mg  400 mg Oral Q12H Bogdan Pineda MD   400 mg at 04/05/19 0921    INV MK-8228/placebo 480 mg oral tablet 480 mg  480 mg Oral Daily Bogdan Pineda MD   480 mg at 04/05/19 0900    INV valganciclovir/placebo tablet 900 mg  900 mg Oral  Daily Bogdan Pineda MD   900 mg at 04/05/19 0921    levothyroxine tablet 75 mcg  75 mcg Oral Before breakfast Bogdan Pineda MD   75 mcg at 04/05/19 0621    LORazepam tablet 1 mg  1 mg Oral BID Bogdan Pineda MD   1 mg at 04/05/19 0918    multivitamin tablet 1 tablet  1 tablet Oral Daily Bogdan Pindea MD   1 tablet at 04/05/19 0918    ondansetron injection 4 mg  4 mg Intravenous Q6H PRN Bogdan Pineda MD        pentamidine inhalation solution 300 mg  300 mg Inhalation Once Ginny West PA-C        rosuvastatin tablet 10 mg  10 mg Oral QHS Ginny West PA-C        sodium chloride 0.9% flush 3 mL  3 mL Intravenous Q8H Bogdan Pineda MD   3 mL at 04/05/19 0621    tacrolimus capsule 1 mg  1 mg Oral Daily Ginny West PA-C   1 mg at 04/04/19 1840    tacrolimus capsule 2 mg  2 mg Oral Daily Ginny West PA-C   2 mg at 04/05/19 0918    vilazodone tablet 40 mg  40 mg Oral Daily Bogdan Pineda MD   40 mg at 04/05/19 0917       Review of Systems  Objective:     Vital Signs (Most Recent):  Temp: 98.2 °F (36.8 °C) (04/05/19 0739)  Pulse: 70 (04/05/19 0739)  Resp: 16 (04/05/19 0739)  BP: (!) 107/53 (04/05/19 0739)  SpO2: 98 % (04/05/19 0739) Vital Signs (24h Range):  Temp:  [98.2 °F (36.8 °C)-99.2 °F (37.3 °C)] 98.2 °F (36.8 °C)  Pulse:  [70-92] 70  Resp:  [16-18] 16  SpO2:  [93 %-98 %] 98 %  BP: (107-137)/(53-78) 107/53     Weight: 93.2 kg (205 lb 7.5 oz)  Body mass index is 32.18 kg/m².    Physical Exam   Constitutional: She appears well-developed and well-nourished. No distress.   HENT:   Head: Normocephalic and atraumatic.   Mouth/Throat: Oropharynx is clear and moist.   Eyes: Conjunctivae and EOM are normal. Right eye exhibits no discharge. Left eye exhibits no discharge. No scleral icterus.   Neck: Normal range of motion.   Cardiovascular: Normal rate and intact distal pulses.   Pulmonary/Chest: Effort normal. No stridor. No respiratory distress.   Abdominal: She exhibits no distension.  There is no guarding.   Musculoskeletal: Normal range of motion. She exhibits tenderness (lower back). She exhibits no edema.   Neurological: She is alert.   Psychiatric: She has a normal mood and affect. Her speech is normal and behavior is normal. Judgment and thought content normal. Cognition and memory are normal. She is attentive.   Nursing note and vitals reviewed.      NEUROLOGICAL EXAMINATION:     MENTAL STATUS   Attention: normal. Concentration: normal.   Speech: speech is normal   Level of consciousness: alert  Knowledge: good.     CRANIAL NERVES     CN II   Visual fields full to confrontation.     CN III, IV, VI   Extraocular motions are normal.     CN VII   Facial expression full, symmetric.     MOTOR EXAM   Muscle bulk: normal  Overall muscle tone: normal    GAIT AND COORDINATION     Tremor   Resting tremor: absent  Intention tremor: absent      Significant Labs:   CBC:   Recent Labs   Lab 04/04/19  0402 04/05/19  0400   WBC 2.63* 2.08*   HGB 8.5* 8.5*   HCT 27.1* 26.8*    180     CMP:   Recent Labs   Lab 04/04/19  0402 04/05/19  0400   * 143*    138   K 4.5 4.4    105   CO2 23 22*   BUN 13 14   CREATININE 1.0 1.0   CALCIUM 9.2 9.0   MG 1.7 1.7   PROT 6.6 6.8   ALBUMIN 3.5 3.6   BILITOT 0.4 0.7   ALKPHOS 133 124   AST 28 29   ALT 30 29   ANIONGAP 10 11   EGFRNONAA >60.0 >60.0     POCT Glucose:   Recent Labs   Lab 04/04/19  2109 04/05/19  0741 04/05/19  1140   POCTGLUCOSE 190* 151* 180*        4/5/2019 04:00   Tacrolimus Lvl 6.7     All pertinent lab results from the past 24 hours have been reviewed.    Significant Imaging: I have reviewed all pertinent imaging results/findings within the past 24 hours.

## 2019-04-05 NOTE — ASSESSMENT & PLAN NOTE
Bilateral leg weakness with exertion since transplant. Initial differentials included neurogenic claudication, a diabetic lumbosacral plexopathy, or compressive plexopathy due to the fluid collection noted surrounding transplanted kidney. Imaging studies reviewed. CT Abd/pelvis reviewed, shows redemonstration of perinephric fluid collection as well as significant spinal canal stenosis which is in line neurogenic claudication. MRI L spine WO contrast from 4/4/19 shows mild disc desiccation at multiple levels, most notably at L1-L2, as well as neural foraminal narrowing. Feel most likely etiology of patient's pain & weakness on ambulation is due to venous congestion of Marshall's plexus that occurs with ambulation and is compressing on the nerve roots of L3-L4, explaining her most notable iliopsoas weakness.     Recommendations:  -- Continue symptom management  -- Continue Physical therapy  -- Limit prolonged use of scheduled BZDs  -- Patient should follow up in neurology clinic to determine whether an EMG would be appropriate, moreso to rule out diabetic lumbosacral plexopathy than to confirm neurogenic claudication.  -- She should also be referred to outpatient neurosurgery for further evaluation and discussion of future surgical intervention to improve her functional status.   -- We have no further recommendations from a neurological perspective on the inpatient side and from our standpoint patient she is stable to be discharged with the recommended follow-ups

## 2019-04-05 NOTE — PLAN OF CARE
Problem: Physical Therapy Goal  Goal: Physical Therapy Goal  Outcome: Ongoing (interventions implemented as appropriate)  Patient at this time is at their functional baseline and does not require skilled acute PT services at this time. Please re consult PT if pt has change in functional status.     Parker Montanez PT, DPT  4/5/2019  Pager: [phone]

## 2019-04-05 NOTE — PROGRESS NOTES
Discharge Medication Note:    Hospital Course:  58 y/o F s/p kidney transplant 1/14/19 admitted for fevers.  Patient found to have UTI with Klebsiella (previously ESBL), will treat with Ertapenem x 14 days (until 4/15/19) per ID recs.  Patient with lower extremity pain, MRI ordered and will have follow up with neurology outpatient.  Patient with diarrhea (c.diff negative), will discharge with PRN imodium.  Patient transitioned from lipitor to crestor per investigational due to being a participant in the CMV study.  ANC is 1400 today, continue to monitor outpatient.  Patient refusing atovaquone, therefore will give one dose of pentamidine prior to discharge, will need to resume PCP ppx on 5/5/19.      Met with Andra Newell at discharge to review discharge medications and to update the blue medication card.       Andra Newell   Home Medication Instructions MARICARMEN:[id number]    Printed on:04/05/19 0903   Medication Information                      ergocalciferol (ERGOCALCIFEROL) 50,000 unit Cap  Take 1 capsule (50,000 Units total) by mouth every 7 days. Take on Tues             ertapenem sodium (ERTAPENEM, INVANZ, 1 G/100 ML NS, READY TO MIX,)  Inject 100 mLs (1 g total) into the vein once daily. Stop: 4/15/19             estradiol (ESTRACE) 0.01 % (0.1 mg/gram) vaginal cream  Place 0.5 g vaginally twice a week. Apply to the vagina daily for two weeks then twice a week.             famotidine (PEPCID) 20 MG tablet  Take 1 tablet (20 mg total) by mouth 2 (two) times daily.             flash glucose scanning reader (FREESTYLE DAKOTAH 14 DAY READER) Misc  Dispense 1 reader             flash glucose sensor (FREESTYLE DAKOTAH 14 DAY SENSOR) Kit  Uses 1 kit monthly             insulin (BASAGLAR KWIKPEN U-100 INSULIN) glargine 100 units/mL (3mL) SubQ pen  Inject 30 Units into the skin every evening.             insulin aspart U-100 (NOVOLOG FLEXPEN U-100 INSULIN) 100 unit/mL InPn pen  Novolog 6 u AC + correction  "Max TDD 40u             INV acyclovir/placebo capsule  Take 1 capsule (400 mg total) by mouth every 12 (twelve) hours. For investigational use only. Study ID# [id number]. Protocol: MK 8228-002             INV MK-8228/placebo 480 mg oral tablet  Take 1 tablet (480 mg total) by mouth once daily. FOR INVESTIGATIONAL USE ONLY. Patient Study# [id number]. Protocol: MK 8228-002             INV valganciclovir/placebo tablet  Take 2 tablets (900 mg total) by mouth once daily.  FOR INVESTIGATIONAL USE ONLY. Study ID# [id number] Protocol MK 8228-002             levothyroxine (SYNTHROID) 75 MCG tablet  TAKE ONE TABLET BY MOUTH ONCE DAILY             LORazepam (ATIVAN) 2 MG Tab  Take 1 tablet (2 mg total) by mouth 2 (two) times daily.             magnesium oxide (MAG-OX) 400 mg (241.3 mg magnesium) tablet  Take 2 tablets (800 mg total) by mouth once daily.             multivitamin (THERAGRAN) tablet  Take 1 tablet by mouth once daily.             pen needle, diabetic (BD ULTRA-FINE MINI PEN NEEDLE) 31 gauge x 3/16" Ndle  USES 4  DAILY             rosuvastatin (CRESTOR) 10 MG tablet  Take 1 tablet (10 mg total) by mouth once daily.             tacrolimus (PROGRAF) 0.5 MG Cap  Take 4 capsules (2 mg total) by mouth every morning AND 4 capsules (2 mg total) every evening. Z94.0 kidney transplant.             VIIBRYD 40 mg Tab tablet  TAKE ONE TABLET BY MOUTH ONCE DAILY                 Pt was provided with prescriptions for the following meds:  E-rx: updated FK, crestor  Printed rx: n/a  Phone-in or faxed rx: n/a to n/a pharmacy per pt request.    The following medications have been placed on HOLD and should be restarted in the outpatient setting (when appropriate): Myfortic on hold    Andra Newell verbalized understanding and had the opportunity to ask questions.  "

## 2019-04-05 NOTE — PROGRESS NOTES
[facility]  Neurology  Progress Note    Patient Name: Andar Newell  MRN: [id number]  Admission Date: 4/2/2019  Hospital Length of Stay: 3 days  Code Status: Full Code   Attending Provider: Jose Bettencourt MD  Primary Care Physician: Gita Cohen MD   Principal Problem:Fever      Subjective:     Interval History: No acute events overnight. Remains afebrile. On contact precautions while ruling out c.diff. Still has pain with ambulation, unchanged from yesterday.  MRI with sedation obtained yesterday afternoon.    Current Neurological Medications:   Lorazepam 1g PO BID  Vilazodone 50mg qD    Current Facility-Administered Medications   Medication Dose Route Frequency Provider Last Rate Last Dose    acetaminophen tablet 650 mg  650 mg Oral Q4H PRN Bogdan Pineda MD        albuterol sulfate nebulizer solution 2.5 mg  2.5 mg Nebulization Once Ginny West PA-C        atovaquone suspension 1,500 mg  1,500 mg Oral Daily Bogdan Pineda MD        dextrose 50% injection 12.5 g  12.5 g Intravenous PRN Bogdan Pineda MD        dextrose 50% injection 25 g  25 g Intravenous PRN Bogdan Pineda MD        ergocalciferol capsule 50,000 Units  50,000 Units Oral Q7 Days Bogdan Pineda MD   50,000 Units at 04/02/19 0919    ertapenem (INVANZ) 1 g in sodium chloride 0.9 % 100 mL IVPB (ready to mix system)  1 g Intravenous Q24H Cammie Kessler  mL/hr at 04/04/19 1931 1 g at 04/04/19 1931    famotidine tablet 20 mg  20 mg Oral BID Bogdan Pineda MD   20 mg at 04/05/19 0918    glucagon (human recombinant) injection 1 mg  1 mg Intramuscular PRN Bogdan Pineda MD        glucose chewable tablet 16 g  16 g Oral PRN Bogdan Pineda MD        glucose chewable tablet 24 g  24 g Oral PRN Bogdan Pineda MD        insulin aspart U-100 pen 1-10 Units  1-10 Units Subcutaneous QID (AC + HS) PRN Bogdan Pineda MD   2 Units at 04/05/19 1256    insulin detemir U-100 pen 20 Units  20 Units Subcutaneous QHS Bogdan  MD Miriam   20 Units at 04/04/19 2109    INV acyclovir/placebo capsule 400 mg  400 mg Oral Q12H Bogdan Pineda MD   400 mg at 04/05/19 0921    INV MK-8228/placebo 480 mg oral tablet 480 mg  480 mg Oral Daily Bogdan Pineda MD   480 mg at 04/05/19 0900    INV valganciclovir/placebo tablet 900 mg  900 mg Oral Daily Bogdan Pineda MD   900 mg at 04/05/19 0921    levothyroxine tablet 75 mcg  75 mcg Oral Before breakfast Bogdan Pineda MD   75 mcg at 04/05/19 0621    LORazepam tablet 1 mg  1 mg Oral BID Bogdan Pineda MD   1 mg at 04/05/19 0918    multivitamin tablet 1 tablet  1 tablet Oral Daily Bogdan Pineda MD   1 tablet at 04/05/19 0918    ondansetron injection 4 mg  4 mg Intravenous Q6H PRN Bogdan Pineda MD        pentamidine inhalation solution 300 mg  300 mg Inhalation Once Ginny West PA-C        rosuvastatin tablet 10 mg  10 mg Oral QHS Ginny West PA-C        sodium chloride 0.9% flush 3 mL  3 mL Intravenous Q8H Bogdan Pineda MD   3 mL at 04/05/19 0621    tacrolimus capsule 1 mg  1 mg Oral Daily Ginny West PA-C   1 mg at 04/04/19 1840    tacrolimus capsule 2 mg  2 mg Oral Daily Ginny West PA-C   2 mg at 04/05/19 0918    vilazodone tablet 40 mg  40 mg Oral Daily Bogdan Pineda MD   40 mg at 04/05/19 0917       Review of Systems  Objective:     Vital Signs (Most Recent):  Temp: 98.2 °F (36.8 °C) (04/05/19 0739)  Pulse: 70 (04/05/19 0739)  Resp: 16 (04/05/19 0739)  BP: (!) 107/53 (04/05/19 0739)  SpO2: 98 % (04/05/19 0739) Vital Signs (24h Range):  Temp:  [98.2 °F (36.8 °C)-99.2 °F (37.3 °C)] 98.2 °F (36.8 °C)  Pulse:  [70-92] 70  Resp:  [16-18] 16  SpO2:  [93 %-98 %] 98 %  BP: (107-137)/(53-78) 107/53     Weight: 93.2 kg (205 lb 7.5 oz)  Body mass index is 32.18 kg/m².    Physical Exam   Constitutional: She appears well-developed and well-nourished. No distress.   HENT:   Head: Normocephalic and atraumatic.   Mouth/Throat: Oropharynx is clear and moist.   Eyes:  Conjunctivae and EOM are normal. Right eye exhibits no discharge. Left eye exhibits no discharge. No scleral icterus.   Neck: Normal range of motion.   Cardiovascular: Normal rate and intact distal pulses.   Pulmonary/Chest: Effort normal. No stridor. No respiratory distress.   Abdominal: She exhibits no distension. There is no guarding.   Musculoskeletal: Normal range of motion. She exhibits tenderness (lower back). She exhibits no edema.   Neurological: She is alert.   Psychiatric: She has a normal mood and affect. Her speech is normal and behavior is normal. Judgment and thought content normal. Cognition and memory are normal. She is attentive.   Nursing note and vitals reviewed.      NEUROLOGICAL EXAMINATION:     MENTAL STATUS   Attention: normal. Concentration: normal.   Speech: speech is normal   Level of consciousness: alert  Knowledge: good.     CRANIAL NERVES     CN II   Visual fields full to confrontation.     CN III, IV, VI   Extraocular motions are normal.     CN VII   Facial expression full, symmetric.     MOTOR EXAM   Muscle bulk: normal  Overall muscle tone: normal    GAIT AND COORDINATION     Tremor   Resting tremor: absent  Intention tremor: absent      Significant Labs:   CBC:   Recent Labs   Lab 04/04/19  0402 04/05/19  0400   WBC 2.63* 2.08*   HGB 8.5* 8.5*   HCT 27.1* 26.8*    180     CMP:   Recent Labs   Lab 04/04/19  0402 04/05/19  0400   * 143*    138   K 4.5 4.4    105   CO2 23 22*   BUN 13 14   CREATININE 1.0 1.0   CALCIUM 9.2 9.0   MG 1.7 1.7   PROT 6.6 6.8   ALBUMIN 3.5 3.6   BILITOT 0.4 0.7   ALKPHOS 133 124   AST 28 29   ALT 30 29   ANIONGAP 10 11   EGFRNONAA >60.0 >60.0     POCT Glucose:   Recent Labs   Lab 04/04/19  2109 04/05/19  0741 04/05/19  1140   POCTGLUCOSE 190* 151* 180*        4/5/2019 04:00   Tacrolimus Lvl 6.7     All pertinent lab results from the past 24 hours have been reviewed.    Significant Imaging: I have reviewed all pertinent imaging  results/findings within the past 24 hours.    CT Abdomen Pelvis With Contrast 4/3/19:    Degenerative/postsurgical changes are noted of the spine.        MRI Lumbar Spine Without Contrast 4/4/19:       FINDINGS:  There are postop changes of left-sided laminectomies extending from L3 through L5.  The lumbar alignment is within normal limits.  The vertebral body heights are maintained.  The bone marrow signal is within normal limits.    The conus terminates at the level of L1.  There is mild thickening of the cauda equina nerve roots.  There is no definitive mass effect in the spinal canal.    There are Modic type II endplate changes involving the L3-L4 and L4-L5 levels.  There is mild disc desiccation at multiple levels.  Evaluation of the individual disc levels reveals the following:    T12-L1, normal.    L1-L2, there is diffuse disc bulge along with facet hypertrophy and ligamentum flavum hypertrophy.  There is superimposed central disc protrusion.  There is moderate narrowing of the spinal canal.  The neural foramina are unremarkable.    L2-L3, there is diffuse disc bulge along with facet hypertrophy and ligamentum flavum hypertrophy.  The spinal canal is within normal limits.  There is mild bilateral neural foraminal narrowing.    L3-L4, there is a left-sided postlaminectomy changes at this level.  There is diffuse disc bulge along with facet hypertrophy and ligamentum flavum hypertrophy.  There is small amount of soft tissue along the central aspect of the spinal canal adjacent to the disc.  The spinal canal is within normal limits.  There is mild bilateral neural foraminal narrowing.    L4-5, there is a left-sided post laminectomy changes at this level.  There is diffuse disc bulge along with facet hypertrophy and ligamentum flavum hypertrophy.  There is small amount of soft tissue along the central aspect of the spinal canal adjacent to the disc.  The spinal canal is within normal limits.  There is mild  bilateral neural foraminal narrowing.    L5-S1, there is a left-sided post laminectomy changes at this level.  There is diffuse disc bulge along with facet hypertrophy and ligamentum flavum hypertrophy.  The spinal canal is within normal limits.  There is mild bilateral neural foraminal narrowing.    There are postoperative changes involving the paraspinous soft tissues.  There are simple appearing bilateral renal cysts.  Both kidneys appear atrophic.  The remainder of the retroperitoneal structures are within normal limits.       Impression         No MR evidence of discitis osteomyelitis in the lumbar spine.    Postoperative changes of prior laminectomies at the L3, L4, and L5 levels.  Soft tissue located within the spinal canal adjacent to the disc.  The findings may represent a recurrent or residual disc.  Postcontrast imaging may be obtained for further evaluation.    Central disc protrusion at the L1-L2 level with associated moderate narrowing of the spinal canal.    Thickening of the cauda equina nerve roots.  Some of this may be secondary arachnoiditis.    Bilateral atrophic kidneys, most suggestive of end-stage renal disease.        Assessment and Plan:     Lumbar stenosis with neurogenic claudication  Bilateral leg weakness with exertion since transplant. Initial differentials included neurogenic claudication, a diabetic lumbosacral plexopathy, or compressive plexopathy due to the fluid collection noted surrounding transplanted kidney. Imaging studies reviewed. CT Abd/pelvis reviewed, shows redemonstration of perinephric fluid collection as well as significant spinal canal stenosis which is in line neurogenic claudication. MRI L spine WO contrast from 4/4/19 shows mild disc desiccation at multiple levels, most notably at L1-L2, as well as neural foraminal narrowing. Feel most likely etiology of patient's pain & weakness on ambulation is due to venous congestion of Marshall's plexus that occurs with  ambulation and is compressing on the nerve roots of L3-L4, explaining her most notable iliopsoas weakness.     Recommendations:  -- Continue symptom management  -- Continue Physical therapy  -- Limit prolonged use of scheduled BZDs  -- Patient should follow up in neurology clinic to determine whether an EMG would be appropriate, moreso to rule out diabetic lumbosacral plexopathy than to confirm neurogenic claudication.  -- She should also be referred to outpatient neurosurgery for further evaluation and discussion of future surgical intervention to improve her functional status.   -- We have no further recommendations from a neurological perspective on the inpatient side and from our standpoint patient she is stable to be discharged with the recommended follow-ups      Kidney transplant recipient  -- US with fluid collection below surgical incision, redemonstrated by CT A/P  -- management per primary        VTE Risk Mitigation (From admission, onward)        Ordered     Place EDUARDO hose  Until discontinued      04/02/19 0149     Place sequential compression device  Until discontinued      04/02/19 0149     IP VTE HIGH RISK PATIENT  Once      04/02/19 0149        Thank you for your consult. We will sign off. Please call with additional questions or concerns.      Paulino Gross MD  Neurology  [facility]

## 2019-04-05 NOTE — PLAN OF CARE
Problem: Adult Inpatient Plan of Care  Goal: Plan of Care Review  Outcome: Ongoing (interventions implemented as appropriate)  - Plan to discharge home as ordered.  Ride will arrive between [id number] per patient  - First dose education provided for new meds.  Pt will begin rosuvastatin tonight per drug study protocol.  Last dose of atorvastatin was administered 4/4 am.  Pentamadine administered by RT.  Pt tolerated well  - Pt states understanding to call/show to ED for fevers, chills, nausea/vomiting  - Pt to discharge home on Ertapenem IV via midline.  Midline to be left in place.  Pt has received education from medication supplier & exhibited good understanding of use of the medication.  Today's dose of Ertapenem was given at 1530 to facilitate discharge.  - PT determined pt is at baseline level of mobility, she will not need PT/OT outpatient

## 2019-04-05 NOTE — CLINICAL REVIEW
Staff Transplant Nephrology addendum:  Patient was seen and examined with Ginny LONG      I agree with assessment and plan. I spoke with the patient for 15 minutes and explained  current plan. Patient and family voiced understanding. All questions answered    Seen by neurology    Diarrhea: No c diff    MRI reviewed by neurology     Electrolytes are fine    VS stable    Cultures NG    Prograf level 6.7 will increase to 2 bid    Pentamidine now. Patient refused atovaquone h/h low and leukopenic No bactrim candidate at the present time.

## 2019-04-05 NOTE — CONSULTS
Midline placed in right cephalic, [id number] Lot# [id number] Removal date on or before 5/4/19. Needle advanced into vessel with real time ultrasound guidance. Image recorded and saved.

## 2019-04-05 NOTE — PROGRESS NOTES
Discharge note:  OPAL met with pt at bedside for continuity of care. Pt reports that she is okay and has been on IV meds before. Pt reports her wife can not be here until after 5pm to pick her up. Pt decided to go with [facility] for her home health.Pt is stable for discharge at this time from a psychosocial standpoint    OPAL spoke with Erlin at [facility]. Erlin confirmed that pt's meds are covered. Pt will be on Ertepenim for 14 days. OPAL spoke with Janki at [facility] to establish home health orders. Janki confirms pt's insurance is accepted.     OPAL remains available.

## 2019-04-05 NOTE — NURSING
Pt discharged to home as ordered.  Left via wheelchair accompanied by partner with all personal belongings and discharge handouts.  Pt requested coban to secure midline tubing, which was provided.  Pt stated no further needs.

## 2019-04-05 NOTE — DISCHARGE SUMMARY
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 valganciclovir CMV Prevention trial, recurrent UTIs with ESBL Klebsiella, recently found to have pansensitive Klebsiella UTI 3/26/2019, presents with fevers and fatigue despite being on ciprofloxacin. She reports fatigue over the past 2 or so weeks, often staying in bed through the morning and taking naps in the afternoon. She began having diarrhea over the past month or so also, this has been worked up and was negative for C dif on 3/6 and 3/13. She presented to the ED 4/1 due to fevers which steadily increased to a tmax of 101.2, she denies recent fevers prior to this, even with previous UTI. Reports mild nausea without emesis. She denies dark urine, edema, abdominal pain, or decreased UOP. A dose of zosyn in ED on admit, transitioned to Cefepime 4/2 for ongoing UTI. ID consulted, with recs to complete 14 day course of Ertapenem for presumed pyelonephritis, finishing on 4/15. Midline placed 4/5 prior to discharge. Kidney US 4/2 showed a fluid collection inferior to the surgical incision measuring 14.0 x 1.2 x 3.1 cm, mildly decreased in size from prior measurement of 12.7 x 1.9 x 6.6 cm. Renal fxn stable. CT scan showing small amt of fluid along incision line, reviewed with surgeon. Not amenable for draining at this time. Pt reports multiple bouts of diarrhea. C dif negative. Stool studies pending. CMV 4/2 undetected.  Viral panel negative. Neurology consulted for weakness on ambulation. Clinical description suggestive of neurogenic claudication related to 3 prior lumbar spine surgeries. Per neuro recs, obtained MRI lumbar spine, showing mild disc desiccation at multiple area, most notable at L1-L2. Recommended pain control and PT. Will follow-up with Neuro as an outpatient. Patient transitioned from lipitor to crestor per investigational due to being a participant in the CMV study.  ANC is 1400 today, continue to monitor outpatient.  Patient refusing atovaquone, reports it makes her vomit. Bactrim initially discontinued in January for hyperkalemia, which has resolved, but pt now with leukopenia. One dose of pentamidine prior to discharge, will need to resume PCP ppx on 5/5/19, if appropriate.  Pt is stable and ready for discharge. She has met with PharmD and received education. Home abx infusions ordered. Pt expressed understanding of discharge instructions and importance of follow-up. She will follow-up with labs and clinic per clinical coordinator        * No surgery found *     Hospital Course:    No notes on file    Final Active Diagnoses:    Diagnosis Date Noted POA    Lumbar stenosis with neurogenic claudication [M48.062] 04/05/2019 Yes    Diarrhea [R19.7] 04/04/2019 Yes    Leg weakness [R29.898] 04/03/2019 Yes    Kidney transplant recipient [Z94.0] 04/02/2019 Not Applicable    UTI (urinary tract infection) [N39.0] 04/02/2019 Yes    Acute on chronic anemia [D64.9] 02/11/2019 Yes    Long-term use of immunosuppressant medication [Z79.899] 01/15/2019 Not Applicable    At risk for opportunistic infections [Z91.89] 01/15/2019 Yes    Vitamin D deficiency [E55.9] 01/15/2019 Yes      Problems Resolved During this Admission:    Diagnosis Date Noted Date Resolved POA    PRINCIPAL PROBLEM:  Fever [R50.9] 04/02/2019 04/05/2019 Yes    Hypomagnesemia [E83.42] 02/12/2019 04/05/2019 Yes       Treatments: as above    Consults  (From admission, onward)        Status Ordering Provider     Inpatient consult to Infectious Diseases  Once     Provider:  (Not yet assigned)    Completed RASHMI MEI     Inpatient consult to Kidney Transplant Surgery  Once     Provider:  (Not yet assigned)    Completed KAITLYN KUHN     Inpatient consult to Kidney/Pancreas Transplant Medicine  Once     Provider:  (Not yet assigned)    Acknowledged RASHMI MEI     Inpatient consult to Midline team  Once     Provider:  (Not yet assigned)    Completed JAMES SPENCER     Inpatient consult to Neurology  Once     Provider:  (Not yet assigned)    Completed RAGINI FARAH          Pending Diagnostic Studies:     Procedure Component Value Units Date/Time    Calprotectin [[id number]] Collected:  04/04/19 1330    Order Status:  Sent Lab Status:  In process Updated:  04/04/19 1649    Specimen:  Stool     H. pylori antigen, stool [[id number]] Collected:  04/04/19 1330    Order Status:  Sent Lab Status:  In process Updated:  04/04/19 1648    Specimen:  Stool     Pancreatic elastase, fecal [[id number]] Collected:  04/04/19 1330    Order Status:  Sent Lab Status:  In process Updated:  04/04/19 1648    Specimen:  Stool         Significant Diagnostic Studies: Labs:   CMP   Recent Labs   Lab 04/04/19  0402 04/05/19  0400    138   K 4.5 4.4    105   CO2 23 22*   * 143*   BUN 13 14   CREATININE 1.0 1.0   CALCIUM 9.2 9.0   PROT 6.6 6.8   ALBUMIN 3.5 3.6   BILITOT 0.4 0.7   ALKPHOS 133 124   AST 28 29   ALT 30 29   ANIONGAP 10 11   ESTGFRAFRICA >60.0 >60.0   EGFRNONAA >60.0 >60.0   , CBC   Recent Labs   Lab 04/04/19  0402 04/05/19  0400   WBC 2.63* 2.08*   HGB 8.5* 8.5*   HCT 27.1* 26.8*    180    and All labs within the past 24 hours have been reviewed    Discharged Condition: good    Disposition: Home or Self Care    Follow Up:    Patient Instructions:      Referral to Home health   Referral Priority: Routine Referral Type: Home Health Care    Referral Reason: Specialty Services Required   Requested Specialty: Home Health Services   Number of Visits Requested: 1     Diet Adult Regular     Notify your health care provider if you experience any of the following:  temperature >100.4     Notify your health care provider if you experience any of the following:  persistent nausea and vomiting or diarrhea     Notify your health care provider if you experience any of the following:  severe uncontrolled pain     Notify your health care provider if you experience any of the following:  redness, tenderness, or signs of infection (pain, swelling, redness, odor or green/yellow discharge around incision site)     Notify your health care provider if you experience any of the following:  difficulty breathing or increased cough     Notify your health care provider if you experience any of the following:  severe persistent headache     Notify your health care provider if you experience any of the following:  worsening rash     Notify your health care provider if you experience any of the following:  persistent dizziness, light-headedness, or visual disturbances     Notify your health care provider if you experience any of the following:  increased confusion or weakness     Activity as tolerated     Medications:  Reconciled Home Medications:      Medication List      START taking these medications    ERTAPENEM (INVANZ) 1 G/100 ML NS (READY TO MIX)  Inject 100 mLs (1 g total) into the vein once daily. Stop: 4/15/19     rosuvastatin 10 MG tablet  Commonly known as:  CRESTOR  Take 1 tablet (10 mg total) by mouth once daily.        CHANGE how you take these medications    insulin aspart U-100 100 unit/mL Inpn pen  Commonly known as:  NovoLOG Flexpen U-100 Insulin  Novolog 10 u AC + correction Max TDD 40u  What changed:  additional instructions     LORazepam 2 MG Tab  Commonly known as:  ATIVAN  Take 1 tablet (2 mg total) by mouth 2 (two) times daily.  What changed:  how much  "to take     tacrolimus 0.5 MG Cap  Commonly known as:  PROGRAF  Take 4 capsules (2 mg total) by mouth every morning AND 4 capsules (2 mg total) every evening. Z94.0 kidney transplant.  What changed:  See the new instructions.        CONTINUE taking these medications    ergocalciferol 50,000 unit Cap  Commonly known as:  ERGOCALCIFEROL  Take 1 capsule (50,000 Units total) by mouth every 7 days. Take on Tues     estradiol 0.01 % (0.1 mg/gram) vaginal cream  Commonly known as:  ESTRACE  Place 0.5 g vaginally twice a week. Apply to the vagina daily for two weeks then twice a week.     famotidine 20 MG tablet  Commonly known as:  PEPCID  Take 1 tablet (20 mg total) by mouth 2 (two) times daily.     flash glucose scanning reader Misc  Commonly known as:  FREESTYLE DAKOTAH 14 DAY READER  Dispense 1 reader     flash glucose sensor Kit  Commonly known as:  FREESTYLE DAKOTAH 14 DAY SENSOR  Uses 1 kit monthly     insulin glargine 100 units/mL (3mL) SubQ pen  Commonly known as:  BASAGLAR KWIKPEN U-100 INSULIN  Inject 30 Units into the skin every evening.     INV acyclovir/placebo 400 MG capsule  Take 1 capsule (400 mg total) by mouth every 12 (twelve) hours. For investigational use only. Study ID# [id number]. Protocol: MK 8228-002     INV MK-8228/placebo 480 mg oral tablet  Take 1 tablet (480 mg total) by mouth once daily. FOR INVESTIGATIONAL USE ONLY. Patient Study# [id number]. Protocol: MK 8228-002     INV valganciclovir/placebo 450 mg tablet  Take 2 tablets (900 mg total) by mouth once daily.  FOR INVESTIGATIONAL USE ONLY. Study ID# [id number] Protocol MK 8228-002     levothyroxine 75 MCG tablet  Commonly known as:  SYNTHROID  TAKE ONE TABLET BY MOUTH ONCE DAILY     magnesium oxide 400 mg (241.3 mg magnesium) tablet  Commonly known as:  MAG-OX  Take 2 tablets (800 mg total) by mouth once daily.     multivitamin tablet  Commonly known as:  THERAGRAN  Take 1 tablet by mouth once daily.     pen needle, diabetic 31 gauge x 3/16" " Ndle  Commonly known as:  BD ULTRA-FINE MINI PEN NEEDLE  USES 4  DAILY     VIIBRYD 40 mg Tab tablet  Generic drug:  vilazodone  TAKE ONE TABLET BY MOUTH ONCE DAILY        STOP taking these medications    atorvastatin 40 MG tablet  Commonly known as:  LIPITOR     atovaquone 750 mg/5 mL Susp  Commonly known as:  MEPRON     ciprofloxacin HCl 500 MG tablet  Commonly known as:  CIPRO          Time spent caring for patient (Greater than 1/2 spent in direct face-to-face contact): > 30 minutes    Ginny West PA-C  Kidney Transplant  [facility]

## 2019-04-06 ENCOUNTER — OUTSIDE PLACE OF SERVICE (OUTPATIENT)
Dept: FAMILY MEDICINE | Facility: CLINIC | Age: 59
End: 2019-04-06
Payer: MEDICARE

## 2019-04-06 ENCOUNTER — NURSE TRIAGE (OUTPATIENT)
Dept: ADMINISTRATIVE | Facility: CLINIC | Age: 59
End: 2019-04-06

## 2019-04-06 PROCEDURE — G0180 PR HOME HEALTH MD CERTIFICATION: ICD-10-PCS | Mod: ,,, | Performed by: INTERNAL MEDICINE

## 2019-04-06 PROCEDURE — G0180 MD CERTIFICATION HHA PATIENT: HCPCS | Mod: ,,, | Performed by: INTERNAL MEDICINE

## 2019-04-06 NOTE — TELEPHONE ENCOUNTER
Reason for Disposition   Caller has medication question only, adult not sick, and triager answers question    Protocols used: MEDICATION QUESTION CALL-A-AH  Kidney tx 1/14/2019  BPA n/a  CC: pt was in hosp this week. DCd last pm.  nurse at home. Question re prograf. Per DC summary/AVS: Prograf Take 4 capsules (2 mg total) by mouth  every morning AND 4 capsules (2 mg total) every evening. Family notified. Call back with questions.

## 2019-04-07 LAB
BACTERIA BLD CULT: NORMAL
BACTERIA BLD CULT: NORMAL

## 2019-04-08 LAB — BACTERIA STL CULT: NORMAL

## 2019-04-08 NOTE — PHYSICIAN QUERY
"PT Name: Andra Newell  MR #: [id number]    Physician Query Form - Hematology Clarification      CDS/: Nuris Salomon               Contact information: [email or web address]     This form is a permanent document in the medical record.      Query Date: April 8, 2019    By submitting this query, we are merely seeking further clarification of documentation. Please utilize your independent clinical judgment when addressing the question(s) below.    The Medical record contains the following:   Indicators  Supporting Clinical Findings Location in Medical Record   X "Anemia" documented Acute on chronic anemia  DC Summary 4/5     X H & H = Hemoglobin 13.1-7.3- 8.5  Hematocrit 40.6-22.2- 26.8 Labs 4/2-4/5    BP =                     HR=      "GI bleeding" documented      Acute bleeding (Non GI site)      Transfusion(s)      Treatment:     X Other:  ESRD living kidney transplant (friend) on 1/14/2019    DC Summary 4/5     Provider, please specify diagnosis or diagnoses associated with above clinical findings.    [  ] Acute blood loss anemia on Anemia of chronic disease ( Specify chronic disease) __________________     [X  ] Anemia of chronic disease ( Specify chronic disease)       [  ] CKD (specify stage):   [  ] Other (Specify):   [  ] Clinically Undetermined       [  ] Other Hematological Diagnosis (please specify):     [  ] Clinically Undetermined       Please document in your progress notes daily for the duration of treatment, until resolved, and include in your discharge summary.                                                                                                      "

## 2019-04-08 NOTE — PHYSICIAN QUERY
"PT Name: Andra Newell  MR #: [id number]    Physician Query Form - Heart  Condition Clarification     CDS/: Nuris Salomon               Contact information: [email or web address]   This form is a permanent document in the medical record.     Query Date: April 8, 2019    By submitting this query, we are merely seeking further clarification of documentation. Please utilize your independent clinical judgment when addressing the question(s) below.    The medical record contains the following   Indicators     Supporting Clinical Findings Location in Medical Record    BNP     X EF d-CHF with 55%EF,    ED Provider Note 4/2    Radiology findings      Echo Results      "Ascites" documented     X "SOB" or "BOYER" documented Negative for shortness of breath   ED Provider Note 4/2    "Hypoxia" documented     X Heart Failure documented d-CHF ED Provider Note 4/2    "Edema" documented      Diuretics/Meds      Treatment:      Other:      Heart failure (HF) can be acute, chronic or both. It is generally further specificed as systolic, diastolic, or combined. Lastly, it is important to identify an underlying etiology if known or suspected.     Common clues to acute exacerbation:  Rapidly progressive symptoms (w/in 2 weeks of presentation), using IV diuretics to treat, using supplemental O2, pulmonary edema on Xray, MI w/in 4 weeks, and/or BNP >500    Systolic Heart Failure: is defined as chart documentation of a left ventricular ejection fraction (LVEF) less than 40%     Diastolic Heart Failure: is defined as a left ventricular ejection fraction (LVEF) greater than 40%   +      Evidence of diastolic dysfunction on echocardiography OR    Right heart catheterization wedge pressure above 12 mm Hg OR    Left heart catheterization left ventricular end diastolic pressure 18 mm Hg or above.    References: *American Heart Association    The clinical guidelines noted below are only system guidelines, and do not replace " the providers clinical judgment.     Provider, please specify the diagnosis associated with above clinical findings                                [ X  ] Chronic Diastolic Heart Failure - Pre-existing diastolic HF diagnosis.  EF > 40%  without  acute HF symptoms documented    [   ] Other (please specify): ___________________________________    [  ] Clinically Undetermined                          Please document in your progress notes daily for the duration of treatment until resolved and include in your discharge summary.

## 2019-04-09 ENCOUNTER — RESEARCH ENCOUNTER (OUTPATIENT)
Dept: RESEARCH | Facility: CLINIC | Age: 59
End: 2019-04-09
Payer: MEDICARE

## 2019-04-09 ENCOUNTER — RESEARCH ENCOUNTER (OUTPATIENT)
Dept: RESEARCH | Facility: HOSPITAL | Age: 59
DRG: 864 | End: 2019-04-09
Payer: MEDICARE

## 2019-04-09 ENCOUNTER — OFFICE VISIT (OUTPATIENT)
Dept: UROGYNECOLOGY | Facility: CLINIC | Age: 59
DRG: 864 | End: 2019-04-09
Payer: MEDICARE

## 2019-04-09 VITALS
BODY MASS INDEX: 33.01 KG/M2 | WEIGHT: 210.31 LBS | HEIGHT: 67 IN | DIASTOLIC BLOOD PRESSURE: 70 MMHG | SYSTOLIC BLOOD PRESSURE: 112 MMHG

## 2019-04-09 DIAGNOSIS — R39.15 URINARY URGENCY: ICD-10-CM

## 2019-04-09 DIAGNOSIS — Z94.0 STATUS POST LIVING-DONOR KIDNEY TRANSPLANTATION: Primary | ICD-10-CM

## 2019-04-09 DIAGNOSIS — Z94.0 KIDNEY TRANSPLANT RECIPIENT: Primary | ICD-10-CM

## 2019-04-09 DIAGNOSIS — Z00.6 RESEARCH STUDY PATIENT: ICD-10-CM

## 2019-04-09 DIAGNOSIS — N39.46 MIXED URGE AND STRESS INCONTINENCE: ICD-10-CM

## 2019-04-09 DIAGNOSIS — D64.9 ACUTE ON CHRONIC ANEMIA: ICD-10-CM

## 2019-04-09 DIAGNOSIS — Z00.6 RESEARCH SUBJECT: ICD-10-CM

## 2019-04-09 DIAGNOSIS — N39.0 RECURRENT UTI: Primary | ICD-10-CM

## 2019-04-09 DIAGNOSIS — N39.0 RECURRENT UTI: ICD-10-CM

## 2019-04-09 LAB
ALBUMIN SERPL BCP-MCNC: 3.6 G/DL (ref 3.5–5.2)
ANION GAP SERPL CALC-SCNC: 9 MMOL/L (ref 8–16)
BASOPHILS # BLD AUTO: 0.02 K/UL (ref 0–0.2)
BASOPHILS NFR BLD: 0.4 % (ref 0–1.9)
BUN SERPL-MCNC: 14 MG/DL (ref 8–23)
CALCIUM SERPL-MCNC: 9.2 MG/DL (ref 8.7–10.5)
CHLORIDE SERPL-SCNC: 111 MMOL/L (ref 95–110)
CO2 SERPL-SCNC: 23 MMOL/L (ref 23–29)
CREAT SERPL-MCNC: 1.1 MG/DL (ref 0.5–1.4)
DIFFERENTIAL METHOD: ABNORMAL
EOSINOPHIL # BLD AUTO: 0.2 K/UL (ref 0–0.5)
EOSINOPHIL NFR BLD: 3 % (ref 0–8)
ERYTHROCYTE [DISTWIDTH] IN BLOOD BY AUTOMATED COUNT: 15.7 % (ref 11.5–14.5)
EST. GFR  (AFRICAN AMERICAN): >60 ML/MIN/1.73 M^2
EST. GFR  (NON AFRICAN AMERICAN): 54.3 ML/MIN/1.73 M^2
GLUCOSE SERPL-MCNC: 177 MG/DL (ref 70–110)
H PYLORI AG STL QL IA: NOT DETECTED
HCT VFR BLD AUTO: 24.5 % (ref 37–48.5)
HGB BLD-MCNC: 8.1 G/DL (ref 12–16)
IMM GRANULOCYTES # BLD AUTO: 0.04 K/UL (ref 0–0.04)
IMM GRANULOCYTES NFR BLD AUTO: 0.7 % (ref 0–0.5)
LYMPHOCYTES # BLD AUTO: 0.4 K/UL (ref 1–4.8)
LYMPHOCYTES NFR BLD: 6.7 % (ref 18–48)
MAGNESIUM SERPL-MCNC: 1.7 MG/DL (ref 1.6–2.6)
MCH RBC QN AUTO: 30.8 PG (ref 27–31)
MCHC RBC AUTO-ENTMCNC: 33.1 G/DL (ref 32–36)
MCV RBC AUTO: 93 FL (ref 82–98)
MONOCYTES # BLD AUTO: 0.3 K/UL (ref 0.3–1)
MONOCYTES NFR BLD: 5.8 % (ref 4–15)
NEUTROPHILS # BLD AUTO: 4.7 K/UL (ref 1.8–7.7)
NEUTROPHILS NFR BLD: 83.4 % (ref 38–73)
NRBC BLD-RTO: 0 /100 WBC
PHOSPHATE SERPL-MCNC: 2.5 MG/DL (ref 2.7–4.5)
PLATELET # BLD AUTO: 183 K/UL (ref 150–350)
PMV BLD AUTO: 10.3 FL (ref 9.2–12.9)
POTASSIUM SERPL-SCNC: 4.2 MMOL/L (ref 3.5–5.1)
RBC # BLD AUTO: 2.63 M/UL (ref 4–5.4)
SODIUM SERPL-SCNC: 143 MMOL/L (ref 136–145)
WBC # BLD AUTO: 5.65 K/UL (ref 3.9–12.7)

## 2019-04-09 PROCEDURE — 99214 OFFICE O/P EST MOD 30 MIN: CPT | Mod: PBBFAC | Performed by: OBSTETRICS & GYNECOLOGY

## 2019-04-09 PROCEDURE — 80197 ASSAY OF TACROLIMUS: CPT

## 2019-04-09 PROCEDURE — 99999 PR PBB SHADOW E&M-EST. PATIENT-LVL IV: CPT | Mod: PBBFAC,,, | Performed by: OBSTETRICS & GYNECOLOGY

## 2019-04-09 PROCEDURE — 83735 ASSAY OF MAGNESIUM: CPT

## 2019-04-09 PROCEDURE — 80069 RENAL FUNCTION PANEL: CPT

## 2019-04-09 PROCEDURE — 99213 PR OFFICE/OUTPT VISIT, EST, LEVL III, 20-29 MIN: ICD-10-PCS | Mod: S$PBB,,, | Performed by: OBSTETRICS & GYNECOLOGY

## 2019-04-09 PROCEDURE — 99499 NO LOS: ICD-10-PCS | Mod: S$PBB,,, | Performed by: CLINICAL NURSE SPECIALIST

## 2019-04-09 PROCEDURE — 85025 COMPLETE CBC W/AUTO DIFF WBC: CPT

## 2019-04-09 PROCEDURE — 99499 UNLISTED E&M SERVICE: CPT | Mod: S$PBB,,, | Performed by: CLINICAL NURSE SPECIALIST

## 2019-04-09 PROCEDURE — 99213 OFFICE O/P EST LOW 20 MIN: CPT | Mod: S$PBB,,, | Performed by: OBSTETRICS & GYNECOLOGY

## 2019-04-09 PROCEDURE — 99999 PR PBB SHADOW E&M-EST. PATIENT-LVL IV: ICD-10-PCS | Mod: PBBFAC,,, | Performed by: OBSTETRICS & GYNECOLOGY

## 2019-04-09 RX ORDER — OXYBUTYNIN CHLORIDE 10 MG/1
10 TABLET, EXTENDED RELEASE ORAL DAILY
Qty: 30 TABLET | Refills: 11 | Status: ON HOLD | OUTPATIENT
Start: 2019-04-09 | End: 2019-05-10

## 2019-04-09 NOTE — PROGRESS NOTES
Protocol: [id number]  IRB# [id number]  PI: Nicholas GUAN)  Site: [id number]  Subject #[id number]     VISIT 9 / WEEK 12:       Date of Visit: 09/APR/2019     Patient arrived at clinic to complete his V9/Wk12 visit. Protocol Labs were drawn in morning at 10:44 AM before study medication was taken from new study medication bottles. All specimens processed and shipped per protocol. Byron SARAVIA conducted visti in its entirety with Sub-I, Sharri Sands present. Patient agrees to continue his participation in the study and all questions answered.                                                                                                                                                                                                                                                                                                                                                    Changes in con-meds (antibiotic, cellcept). Subject reports, no new AEs have occurred when asked. Bernardino Roy-I performed a physical exam and assessed recent JOHN hospitalization.     Vital signs obtained sitting:  BP: 116/71  HR: 76  RR: 18  Wt: 95.3 kg  T: 98.7 F oral     CMV Disease Assessment, Health Outcomes Assessment, Renal Transplant Outcomes, Health Outcomes Assessment and Child Pierre Score completed. Subject agreed to continue to follow compliance in regards to effective birth control as required by protocol.       Diary collected and reviewed with subject. Pt stated that he took medications every day and did not miss any days. Pt stated no loss of drug. Current diary collected. New diary dispensed.      Pill bottles were brought to visit and collected. Drug returned:   MK-8228/Placebo -     6  VGCV/Placebo -         12  ACV/Placebo -            9     Old bottles returned to research pharmacy.  re-educated the patient on the importance of bringing all bottles back into next visit. Subject was assigned new  study drug bottles.       New study medication (6 bottles) dispensed to subject: [id number] and [id number] and [id number] and [id number] and [id number] and [id number].   CrCl = 89. No dose change. Will be given:  MK-8228/Placebo -     1 pill per day  VGCV/Placebo -         2 pills per day  ACV/Placebo -           1M/1E pills per day     All 3 study medication taken in clinic from new bottles today 09/APR/2019 at 11:56.     Detailed instructions to bring ALL bottles back to the site. Education and training provided on when to take study drug, and what not to eat/drink while taking study medication, etc.      Study participant reminded to call  for any questions. Reminder to bring diary and all pill bottles back to next appointment on 09/APR/2019 at 10:00 (labs) and 10:30 (visit).

## 2019-04-09 NOTE — PATIENT INSTRUCTIONS
1. Recurrent UTI:   --Managed by Dr. Kessler    2. Prolapse: Stage1 apical prolapse, Stage 2 posterior vaginal wall prolapse   --Daily pelvic floor exercises as assigned  --Non-surgical options discussed with patient      3.  Vaginal atrophy (dryness):  Use 1 gram of estrogen cream in vagina nightly x 2 weeks, then twice a week thereafter.      4. Mixed urinary incontinence, urge> > stress:  --Bladder diary for 3-7 days, write the number of times you go to the rest room and associated symptoms of urgency or leakage.   --Empty bladder every 3 hours.  Empty well: wait a minute, lean forward on toilet.    --Avoid dietary irritants (see sheet).  Keep diary x 3-5 days to determine your irritants.  --KEGELS: do 10 in AM and 10 in PM, holding each x 10 seconds.  When you feel urge to go, STOP, KEGEL, and when urge has passed, then go to bathroom.    --URGE: start Ditropan 10 mg daily.   SE profile reviewed.  Takes 2-4 weeks to see if will have effect.    --STRESS:  Pelvic floor PT  --Cystourethroscopy   -- urine cytology      5. Morbid Obesity: discussed healthy eating habits, consider nutritional consult. Obesity has been shown to be an independent risk factor for urinary incontinence. Weight loss has been shown to decrease incidence of urinary incontinence significantly.

## 2019-04-09 NOTE — PROGRESS NOTES
Subjective:      Patient ID: Andra Newell is a 59 y.o. female.    Chief Complaint:Research      History of Present Illness  Protocol: [id number]  IRB# 2017.512  PI: Nicholas GUAN)  Site: [id number]  Subject #[id number]     VISIT 9 / WEEK 12:       Date of Visit: 09/APR/2019    Ms. Andra Newell is a 59 y.o.female who presents to clinic today for V9-Wk12 visit. Ms. Newell has a past medical history of diabetes type 2, HTN, hyperlipidemia, arthritis, GERD, and ESRD who is s/p a living-donor kidney transplant (D+/R-) on 1/14/19     Patient was recently admitted in the hospital from 4/2/19-4/5/19 with fever. States post transplant continues to be fatigued and weak, tiring easily and becoming dyspneic with ambulation. She was being treated for Klebsiella UTI at the time of admission. Antibiotics changed per ID to 14 day course of Ertapenem for presumed polynephritis. Ultrasound of kidney showed fluid collection, decision no drainage needed. CMV undetectable, viral panel negative. Patient with continued complaints of diarrhea. All stool test negative. Neurology consulted for increased weakness to BLE and MRI of spine obtained. Neurology recommending pain control and PT.        Reports today that symptoms no worse. While sitting she feels fine, but becomes easily fatigued with activity. Patient has been anemic post transplant, last Epoetin injection 2/27/19. Denies chest pain, fever, chills, nausea, vomiting, hematuria or dysuria. On labs from 4/5/19 Creatinine-1.0 CrCl- 89.       Review of Systems   Constitution: Positive for malaise/fatigue. Negative for chills and fever.   HENT: Negative for congestion and sore throat.    Cardiovascular: Positive for dyspnea on exertion. Negative for chest pain and leg swelling.   Respiratory: Negative for cough, shortness of breath and sputum production.    Skin: Negative for rash.   Musculoskeletal: Positive for back pain. Negative for joint swelling.    Gastrointestinal: Negative for nausea and vomiting.   Genitourinary: Negative for flank pain and hematuria.   Neurological: Negative for dizziness and headaches.   Psychiatric/Behavioral: Negative for altered mental status.     Objective:   Physical Exam   Constitutional: She is oriented to person, place, and time. She appears well-developed and well-nourished.   HENT:   Head: Normocephalic and atraumatic.   Right Ear: External ear normal.   Left Ear: External ear normal.   Nose: Nose normal.   Eyes: Conjunctivae and EOM are normal. Right eye exhibits no discharge. Left eye exhibits no discharge.   Neck: Normal range of motion.   Cardiovascular: Normal rate and normal heart sounds.   Pulmonary/Chest: Effort normal and breath sounds normal. No respiratory distress. She has no wheezes. She has no rales.   Abdominal: Soft. Bowel sounds are normal. She exhibits no distension. There is no tenderness. There is no guarding.   Musculoskeletal: Normal range of motion. She exhibits no edema.   Neurological: She is alert and oriented to person, place, and time.   Skin: Skin is warm and dry.   Psychiatric: She has a normal mood and affect. Her behavior is normal. Judgment and thought content normal.     Assessment:       1. Status post living-donor kidney transplantation    2. Research study patient    3. Recurrent UTI    4. Acute on chronic anemia        Labs 4/5/19 reviewed. Physical assessment with no abnormal findings. Vitals at baseline. Will order and continue investigational drugs at current dose/schedule. Patient instructed to call ID research team with any concerns related to research drugs.   Plan:       1. Study drug dispensed  2. RTC in 4 weeks for next ID research appointment with labs prior

## 2019-04-10 ENCOUNTER — NURSE TRIAGE (OUTPATIENT)
Dept: ADMINISTRATIVE | Facility: CLINIC | Age: 59
End: 2019-04-10

## 2019-04-10 ENCOUNTER — TELEPHONE (OUTPATIENT)
Dept: TRANSPLANT | Facility: CLINIC | Age: 59
End: 2019-04-10

## 2019-04-10 ENCOUNTER — HOSPITAL ENCOUNTER (OUTPATIENT)
Dept: RADIOLOGY | Facility: HOSPITAL | Age: 59
Discharge: HOME | DRG: 864 | End: 2019-04-10
Attending: INTERNAL MEDICINE
Payer: MEDICARE

## 2019-04-10 ENCOUNTER — OFFICE VISIT (OUTPATIENT)
Dept: UROGYNECOLOGY | Facility: CLINIC | Age: 59
DRG: 864 | End: 2019-04-10
Payer: MEDICARE

## 2019-04-10 ENCOUNTER — TELEPHONE (OUTPATIENT)
Dept: INFECTIOUS DISEASES | Facility: CLINIC | Age: 59
End: 2019-04-10

## 2019-04-10 ENCOUNTER — CLINICAL SUPPORT (OUTPATIENT)
Dept: TRANSPLANT | Facility: CLINIC | Age: 59
DRG: 864 | End: 2019-04-10
Payer: MEDICARE

## 2019-04-10 VITALS
WEIGHT: 212.94 LBS | SYSTOLIC BLOOD PRESSURE: 100 MMHG | HEIGHT: 66 IN | BODY MASS INDEX: 34.22 KG/M2 | DIASTOLIC BLOOD PRESSURE: 70 MMHG

## 2019-04-10 VITALS
HEART RATE: 80 BPM | WEIGHT: 210.75 LBS | OXYGEN SATURATION: 97 % | RESPIRATION RATE: 18 BRPM | TEMPERATURE: 98 F | BODY MASS INDEX: 33.87 KG/M2 | SYSTOLIC BLOOD PRESSURE: 101 MMHG | HEIGHT: 66 IN | DIASTOLIC BLOOD PRESSURE: 65 MMHG

## 2019-04-10 DIAGNOSIS — Z94.0 KIDNEY REPLACED BY TRANSPLANT: ICD-10-CM

## 2019-04-10 DIAGNOSIS — R06.02 SOB (SHORTNESS OF BREATH): ICD-10-CM

## 2019-04-10 DIAGNOSIS — D63.1 ANEMIA IN STAGE 3 CHRONIC KIDNEY DISEASE: ICD-10-CM

## 2019-04-10 DIAGNOSIS — D64.9 ACUTE ON CHRONIC ANEMIA: Primary | ICD-10-CM

## 2019-04-10 DIAGNOSIS — N39.0 RECURRENT UTI: ICD-10-CM

## 2019-04-10 DIAGNOSIS — N18.30 ANEMIA IN STAGE 3 CHRONIC KIDNEY DISEASE: ICD-10-CM

## 2019-04-10 DIAGNOSIS — R06.02 SOB (SHORTNESS OF BREATH): Primary | ICD-10-CM

## 2019-04-10 LAB
BILIRUB SERPL-MCNC: NORMAL MG/DL
BLOOD URINE, POC: NORMAL
COLOR, POC UA: NORMAL
GLUCOSE UR QL STRIP: NORMAL
KETONES UR QL STRIP: NORMAL
LEUKOCYTE ESTERASE URINE, POC: NORMAL
NITRITE, POC UA: NORMAL
PH, POC UA: 5
PROTEIN, POC: NORMAL
SPECIFIC GRAVITY, POC UA: 1.02
TACROLIMUS BLD-MCNC: 11.2 NG/ML (ref 5–15)
UROBILINOGEN, POC UA: NORMAL

## 2019-04-10 PROCEDURE — 71046 XR CHEST PA AND LATERAL: ICD-10-PCS | Mod: 26,,, | Performed by: RADIOLOGY

## 2019-04-10 PROCEDURE — 52000 CYSTOURETHROSCOPY: CPT | Mod: PBBFAC | Performed by: OBSTETRICS & GYNECOLOGY

## 2019-04-10 PROCEDURE — 88112 CYTOLOGY SPECIMEN-URINE: ICD-10-PCS | Mod: 26,,, | Performed by: PATHOLOGY

## 2019-04-10 PROCEDURE — 99214 OFFICE O/P EST MOD 30 MIN: CPT | Mod: PBBFAC,25,27

## 2019-04-10 PROCEDURE — 99213 OFFICE O/P EST LOW 20 MIN: CPT | Mod: PBBFAC,25

## 2019-04-10 PROCEDURE — 99499 NO LOS: ICD-10-PCS | Mod: S$PBB,,, | Performed by: OBSTETRICS & GYNECOLOGY

## 2019-04-10 PROCEDURE — 88112 CYTOPATH CELL ENHANCE TECH: CPT | Performed by: PATHOLOGY

## 2019-04-10 PROCEDURE — 99999 PR PBB SHADOW E&M-EST. PATIENT-LVL III: CPT | Mod: PBBFAC,,,

## 2019-04-10 PROCEDURE — 71046 X-RAY EXAM CHEST 2 VIEWS: CPT | Mod: TC,FY

## 2019-04-10 PROCEDURE — 88112 CYTOPATH CELL ENHANCE TECH: CPT | Mod: 26,,, | Performed by: PATHOLOGY

## 2019-04-10 PROCEDURE — 99999 PR PBB SHADOW E&M-EST. PATIENT-LVL IV: ICD-10-PCS | Mod: PBBFAC,,,

## 2019-04-10 PROCEDURE — 81002 URINALYSIS NONAUTO W/O SCOPE: CPT | Mod: PBBFAC

## 2019-04-10 PROCEDURE — 99999 PR PBB SHADOW E&M-EST. PATIENT-LVL IV: CPT | Mod: PBBFAC,,,

## 2019-04-10 PROCEDURE — 52000 CYSTOURETHROSCOPY: CPT | Mod: S$PBB,,, | Performed by: OBSTETRICS & GYNECOLOGY

## 2019-04-10 PROCEDURE — 99499 UNLISTED E&M SERVICE: CPT | Mod: S$PBB,,, | Performed by: OBSTETRICS & GYNECOLOGY

## 2019-04-10 PROCEDURE — 52000 PR CYSTOURETHROSCOPY: ICD-10-PCS | Mod: S$PBB,,, | Performed by: OBSTETRICS & GYNECOLOGY

## 2019-04-10 PROCEDURE — 99999 PR PBB SHADOW E&M-EST. PATIENT-LVL III: ICD-10-PCS | Mod: PBBFAC,,,

## 2019-04-10 PROCEDURE — 71046 X-RAY EXAM CHEST 2 VIEWS: CPT | Mod: 26,,, | Performed by: RADIOLOGY

## 2019-04-10 RX ORDER — LIDOCAINE HYDROCHLORIDE 20 MG/ML
JELLY TOPICAL
Status: COMPLETED | OUTPATIENT
Start: 2019-04-10 | End: 2019-04-10

## 2019-04-10 RX ORDER — TACROLIMUS 0.5 MG/1
CAPSULE ORAL
Qty: 180 CAPSULE | Refills: 11 | Status: ON HOLD | OUTPATIENT
Start: 2019-04-10 | End: 2019-04-13 | Stop reason: SDUPTHER

## 2019-04-10 RX ADMIN — LIDOCAINE HYDROCHLORIDE 5 ML: 20 JELLY TOPICAL at 03:04

## 2019-04-10 NOTE — PROGRESS NOTES
Procrit 20,000 units administered sq to right posterior arm per MD order. Patient tolerated with no complaints. Patient informed of side effects including bone pain, muscle aches and tiredness. Verbalized acknowledgment.

## 2019-04-10 NOTE — PROGRESS NOTES
Date: 4/10/2019    Title of Operation:   Cystourethroscopy.     INDICATIONS:    59-year-old female with history of DM/HTN/antibiotic induced nephropathy c/b ESRD living kidney transplant (friend) on 1/14/2019 with campath induction, now on MMR, tacro for maintenance, CMV D+/R- in [employer] CMV Prevention trial,  Referred by Dr. Millard for evaluation of recurrent UTI. She has urinary urgency,  Mixed incontinence, urge predominant.   Patient reports feeling bladder pressure and at times pain not associated with urination.  Denied any hematuria, dysuria, fevers, chills, sweats.  She has been treated with 3 courses of antibiotics, initially cefpodoxime 200 mg PO BID x5 days, then amox-clav 875-125 mg PO BID x7 days, currently on cipro 500 mg PO BID x 7 days.  She reports only minimal relief in symptoms during antibiotic course and after completing antibiotics.  All cultures have grown ESBL Klebsiella.  Urinary stent has been removed.  Denied having any adverse side effects related to antibiotics.  Denied any n/v/d, abdominal pain.  She denied having any history of recurrent UTIs prior to transplant.    She presented to the ED 4/1 due to fevers which steadily increased to a tmax of 101.2, she denies recent fevers prior to this, even with previous UTI. Reports mild nausea without emesis. She denies dark urine, edema, abdominal pain, or decreased UOP.  Admit to [facility] for further work-up. Treated with 1 gm Invanz daily, to complete on 4/15/2019    PREOPERATIVE DIAGNOSIS  1. Recurrent UTI  2. Urinary urgency     POSTOPERATIVE DIAGNOSIS:   1. Recurrent UTI  2. Urinary urgency     Anesthesia:   2% Xylocaine gel.    Specimen (Bacteriological, Pathological or other):   None.     Prosthetic Device/Implant:   None.     Surgeons Narrative:     After informed consent was obtained, the patient was placed in the lithotomy position. The urethral meatus was prepped with Betadine and 10 cubic centimeters of 2% Xylocaine gel were  introduced into the urethra. A flexible cystourethroscope was introduced into the bladder. The bladder was distended with approximately 300 cubic centimeters of sterile water. A systematic survey was performed in which the bladder was surveyed using multiple sequential passes in a clockwise fashion from the bladder dome to the bladder base to the urethrovesical junction. The trigone and ureteral orifices were observed - no flow. The transplanted ureter was implanted on the right dome, + efflux noted.  the scope was then flipped back on itself, and the urethrovesical junction was viewed.  The telescope was then completely withdrawn. No masses, stones, polyps, bladder diverticulums noted.     Findings: Urethroscopy:  Normal.  Cystoscopy:  Normal bladder mucosa, right dome implanted ureter noted with + efflux/      Assessment: Essentially normal cystourethroscopy      Plan: The patient will follow up with Dr. Artis

## 2019-04-10 NOTE — LETTER
April 10, 2019        Cammie Kessler MD  [address] [facility] [address]             [facility] [address]   [address] [address]  Phone: [phone]   Patient: Andra Newell   MR Number: [id number]   YOB: 1960   Date of Visit: 4/10/2019       Dear Dr. Kessler:     I saw  Andra Newell for follow up after the recent hospitalization. We did a cystoscopy and noted an essentially normal bladder with efflux of the  implanted ureter at the right dome. As discussed she will continue to see you for UTI's and I will work on improving her urinary incontinence. Below are the relevant portions of my assessment and plan of care.        If you have questions, please do not hesitate to call me. I look forward to following Andra along with you.    Sincerely,      Jacqui Artis, DO           CC  No Recipients

## 2019-04-10 NOTE — TELEPHONE ENCOUNTER
VORB Proceed with 2D Echo .  Cxray AP/lat.  Decrease Tacrolimus 1.5mg PO q 12 hours.  Procrit 20K units x 1 today.  Add BNP to 4/10 labs.  UPCR today .  Pulmonary consult for chronic SOB.  _______________  4/9 labs reviewed.  Reviewed with MD after receiving phone call from patient C/O weight gain 6lbs in the last 4 days with chronic SOB and fatigue.  Patient repeated back and voice a understanding of orders.

## 2019-04-10 NOTE — TELEPHONE ENCOUNTER
Leydi from [facility] called and labs drawn 04/10 with critical of hemoglobin 7.5 hemotacrit 23.1.

## 2019-04-11 ENCOUNTER — TELEPHONE (OUTPATIENT)
Dept: TRANSPLANT | Facility: CLINIC | Age: 59
End: 2019-04-11

## 2019-04-11 ENCOUNTER — HOSPITAL ENCOUNTER (INPATIENT)
Facility: HOSPITAL | Age: 59
LOS: 2 days | Discharge: HOME | DRG: 864 | End: 2019-04-13
Attending: EMERGENCY MEDICINE | Admitting: TRANSPLANT SURGERY
Payer: MEDICARE

## 2019-04-11 DIAGNOSIS — Z91.89 AT RISK FOR OPPORTUNISTIC INFECTIONS: ICD-10-CM

## 2019-04-11 DIAGNOSIS — D84.9 IMMUNOSUPPRESSED STATUS: ICD-10-CM

## 2019-04-11 DIAGNOSIS — Z79.60 LONG-TERM USE OF IMMUNOSUPPRESSANT MEDICATION: ICD-10-CM

## 2019-04-11 DIAGNOSIS — R50.9 FEVER: ICD-10-CM

## 2019-04-11 DIAGNOSIS — R10.9 ABDOMINAL PAIN, UNSPECIFIED ABDOMINAL LOCATION: ICD-10-CM

## 2019-04-11 DIAGNOSIS — Z94.0 KIDNEY REPLACED BY TRANSPLANT: ICD-10-CM

## 2019-04-11 DIAGNOSIS — Z29.89 PROPHYLACTIC IMMUNOTHERAPY: ICD-10-CM

## 2019-04-11 DIAGNOSIS — R50.9 FEVER, UNSPECIFIED: ICD-10-CM

## 2019-04-11 DIAGNOSIS — N28.9 FUNCTION KIDNEY DECREASED: ICD-10-CM

## 2019-04-11 DIAGNOSIS — Z87.440 HISTORY OF RECURRENT URINARY TRACT INFECTION: ICD-10-CM

## 2019-04-11 DIAGNOSIS — Z94.0 S/P KIDNEY TRANSPLANT: Primary | ICD-10-CM

## 2019-04-11 LAB
ALBUMIN SERPL BCP-MCNC: 3.2 G/DL (ref 3.5–5.2)
ALP SERPL-CCNC: 158 U/L (ref 55–135)
ALT SERPL W/O P-5'-P-CCNC: 31 U/L (ref 10–44)
ANION GAP SERPL CALC-SCNC: 8 MMOL/L (ref 8–16)
ANISOCYTOSIS BLD QL SMEAR: SLIGHT
AST SERPL-CCNC: 34 U/L (ref 10–40)
BASO STIPL BLD QL SMEAR: ABNORMAL
BASOPHILS NFR BLD: 1 % (ref 0–1.9)
BILIRUB SERPL-MCNC: 0.5 MG/DL (ref 0.1–1)
BILIRUB UR QL STRIP: NEGATIVE
BNP SERPL-MCNC: 135 PG/ML (ref 0–99)
BUN SERPL-MCNC: 19 MG/DL (ref 8–23)
CALCIUM SERPL-MCNC: 8.9 MG/DL (ref 8.7–10.5)
CALPROTECTIN STL-MCNT: <15.6 MCG/G
CHLORIDE SERPL-SCNC: 111 MMOL/L (ref 95–110)
CLARITY UR REFRACT.AUTO: CLEAR
CO2 SERPL-SCNC: 24 MMOL/L (ref 23–29)
COLOR UR AUTO: YELLOW
CREAT SERPL-MCNC: 1.6 MG/DL (ref 0.5–1.4)
DIFFERENTIAL METHOD: ABNORMAL
ELASTASE 1, FECAL: 106 MCG/G
EOSINOPHIL NFR BLD: 1 % (ref 0–8)
ERYTHROCYTE [DISTWIDTH] IN BLOOD BY AUTOMATED COUNT: 15.9 % (ref 11.5–14.5)
EST. GFR  (AFRICAN AMERICAN): 39.8 ML/MIN/1.73 M^2
EST. GFR  (NON AFRICAN AMERICAN): 34.5 ML/MIN/1.73 M^2
GLUCOSE SERPL-MCNC: 209 MG/DL (ref 70–110)
GLUCOSE UR QL STRIP: NEGATIVE
HCT VFR BLD AUTO: 21.2 % (ref 37–48.5)
HGB BLD-MCNC: 7 G/DL (ref 12–16)
HGB UR QL STRIP: NEGATIVE
HYPOCHROMIA BLD QL SMEAR: ABNORMAL
IMM GRANULOCYTES # BLD AUTO: ABNORMAL K/UL (ref 0–0.04)
IMM GRANULOCYTES NFR BLD AUTO: ABNORMAL % (ref 0–0.5)
INFLUENZA A, MOLECULAR: NEGATIVE
INFLUENZA B, MOLECULAR: NEGATIVE
KETONES UR QL STRIP: NEGATIVE
LACTATE SERPL-SCNC: 1.2 MMOL/L (ref 0.5–2.2)
LEUKOCYTE ESTERASE UR QL STRIP: NEGATIVE
LYMPHOCYTES NFR BLD: 8 % (ref 18–48)
MCH RBC QN AUTO: 30.8 PG (ref 27–31)
MCHC RBC AUTO-ENTMCNC: 33 G/DL (ref 32–36)
MCV RBC AUTO: 93 FL (ref 82–98)
METAMYELOCYTES NFR BLD MANUAL: 2 %
MONOCYTES NFR BLD: 2 % (ref 4–15)
MYELOCYTES NFR BLD MANUAL: 2 %
NEUTROPHILS NFR BLD: 83 % (ref 38–73)
NEUTS BAND NFR BLD MANUAL: 1 %
NITRITE UR QL STRIP: NEGATIVE
NRBC BLD-RTO: 1 /100 WBC
OVALOCYTES BLD QL SMEAR: ABNORMAL
PH UR STRIP: 5 [PH] (ref 5–8)
PLATELET # BLD AUTO: 153 K/UL (ref 150–350)
PLATELET BLD QL SMEAR: ABNORMAL
PMV BLD AUTO: 9.8 FL (ref 9.2–12.9)
POCT GLUCOSE: 127 MG/DL (ref 70–110)
POIKILOCYTOSIS BLD QL SMEAR: SLIGHT
POLYCHROMASIA BLD QL SMEAR: ABNORMAL
POTASSIUM SERPL-SCNC: 4.4 MMOL/L (ref 3.5–5.1)
PROCALCITONIN SERPL IA-MCNC: 0.07 NG/ML
PROT SERPL-MCNC: 6.2 G/DL (ref 6–8.4)
PROT UR QL STRIP: NEGATIVE
RBC # BLD AUTO: 2.27 M/UL (ref 4–5.4)
SMUDGE CELLS BLD QL SMEAR: PRESENT
SODIUM SERPL-SCNC: 143 MMOL/L (ref 136–145)
SP GR UR STRIP: 1.01 (ref 1–1.03)
SPECIMEN SOURCE: NORMAL
URN SPEC COLLECT METH UR: NORMAL
WBC # BLD AUTO: 2.68 K/UL (ref 3.9–12.7)

## 2019-04-11 PROCEDURE — S5571 INSULIN DISPOS PEN 3 ML: HCPCS | Performed by: STUDENT IN AN ORGANIZED HEALTH CARE EDUCATION/TRAINING PROGRAM

## 2019-04-11 PROCEDURE — 84145 PROCALCITONIN (PCT): CPT

## 2019-04-11 PROCEDURE — 87502 INFLUENZA DNA AMP PROBE: CPT

## 2019-04-11 PROCEDURE — 85007 BL SMEAR W/DIFF WBC COUNT: CPT

## 2019-04-11 PROCEDURE — 25000003 PHARM REV CODE 250: Performed by: PHYSICIAN ASSISTANT

## 2019-04-11 PROCEDURE — 80197 ASSAY OF TACROLIMUS: CPT

## 2019-04-11 PROCEDURE — 99285 PR EMERGENCY DEPT VISIT,LEVEL V: ICD-10-PCS | Mod: ,,, | Performed by: EMERGENCY MEDICINE

## 2019-04-11 PROCEDURE — 83880 ASSAY OF NATRIURETIC PEPTIDE: CPT

## 2019-04-11 PROCEDURE — 82962 GLUCOSE BLOOD TEST: CPT

## 2019-04-11 PROCEDURE — 99285 EMERGENCY DEPT VISIT HI MDM: CPT | Mod: ,,, | Performed by: EMERGENCY MEDICINE

## 2019-04-11 PROCEDURE — 85027 COMPLETE CBC AUTOMATED: CPT

## 2019-04-11 PROCEDURE — 96365 THER/PROPH/DIAG IV INF INIT: CPT

## 2019-04-11 PROCEDURE — 93005 ELECTROCARDIOGRAM TRACING: CPT

## 2019-04-11 PROCEDURE — 63600175 PHARM REV CODE 636 W HCPCS: Performed by: STUDENT IN AN ORGANIZED HEALTH CARE EDUCATION/TRAINING PROGRAM

## 2019-04-11 PROCEDURE — 81003 URINALYSIS AUTO W/O SCOPE: CPT

## 2019-04-11 PROCEDURE — 25000003 PHARM REV CODE 250: Performed by: STUDENT IN AN ORGANIZED HEALTH CARE EDUCATION/TRAINING PROGRAM

## 2019-04-11 PROCEDURE — 99285 EMERGENCY DEPT VISIT HI MDM: CPT | Mod: 25

## 2019-04-11 PROCEDURE — 80053 COMPREHEN METABOLIC PANEL: CPT

## 2019-04-11 PROCEDURE — 20600001 HC STEP DOWN PRIVATE ROOM

## 2019-04-11 PROCEDURE — 83605 ASSAY OF LACTIC ACID: CPT

## 2019-04-11 PROCEDURE — 96372 THER/PROPH/DIAG INJ SC/IM: CPT

## 2019-04-11 PROCEDURE — 93010 EKG 12-LEAD: ICD-10-PCS | Mod: ,,, | Performed by: INTERNAL MEDICINE

## 2019-04-11 PROCEDURE — 93010 ELECTROCARDIOGRAM REPORT: CPT | Mod: ,,, | Performed by: INTERNAL MEDICINE

## 2019-04-11 PROCEDURE — 87040 BLOOD CULTURE FOR BACTERIA: CPT | Mod: 59

## 2019-04-11 PROCEDURE — 12000002 HC ACUTE/MED SURGE SEMI-PRIVATE ROOM

## 2019-04-11 PROCEDURE — 96361 HYDRATE IV INFUSION ADD-ON: CPT

## 2019-04-11 RX ORDER — LORAZEPAM 1 MG/1
1 TABLET ORAL 2 TIMES DAILY
Status: DISCONTINUED | OUTPATIENT
Start: 2019-04-11 | End: 2019-04-13 | Stop reason: HOSPADM

## 2019-04-11 RX ORDER — INSULIN ASPART 100 [IU]/ML
10 INJECTION, SOLUTION INTRAVENOUS; SUBCUTANEOUS
Status: DISCONTINUED | OUTPATIENT
Start: 2019-04-12 | End: 2019-04-13 | Stop reason: HOSPADM

## 2019-04-11 RX ORDER — LANOLIN ALCOHOL/MO/W.PET/CERES
800 CREAM (GRAM) TOPICAL DAILY
Status: DISCONTINUED | OUTPATIENT
Start: 2019-04-12 | End: 2019-04-13 | Stop reason: HOSPADM

## 2019-04-11 RX ORDER — FAMOTIDINE 20 MG/1
20 TABLET, FILM COATED ORAL 2 TIMES DAILY
Status: DISCONTINUED | OUTPATIENT
Start: 2019-04-11 | End: 2019-04-13 | Stop reason: HOSPADM

## 2019-04-11 RX ORDER — VILAZODONE HYDROCHLORIDE 10 MG/1
40 TABLET ORAL DAILY
Status: DISCONTINUED | OUTPATIENT
Start: 2019-04-12 | End: 2019-04-13 | Stop reason: HOSPADM

## 2019-04-11 RX ORDER — SODIUM CHLORIDE 9 MG/ML
INJECTION, SOLUTION INTRAVENOUS CONTINUOUS
Status: DISCONTINUED | OUTPATIENT
Start: 2019-04-11 | End: 2019-04-12

## 2019-04-11 RX ORDER — OXYBUTYNIN CHLORIDE 10 MG/1
10 TABLET, EXTENDED RELEASE ORAL DAILY
Status: DISCONTINUED | OUTPATIENT
Start: 2019-04-12 | End: 2019-04-13 | Stop reason: HOSPADM

## 2019-04-11 RX ORDER — LEVOTHYROXINE SODIUM 75 UG/1
75 TABLET ORAL
Status: DISCONTINUED | OUTPATIENT
Start: 2019-04-12 | End: 2019-04-13 | Stop reason: HOSPADM

## 2019-04-11 RX ADMIN — TACROLIMUS 1.5 MG: 1 CAPSULE ORAL at 09:04

## 2019-04-11 RX ADMIN — FAMOTIDINE 20 MG: 20 TABLET, FILM COATED ORAL at 09:04

## 2019-04-11 RX ADMIN — INSULIN DETEMIR 30 UNITS: 100 INJECTION, SOLUTION SUBCUTANEOUS at 09:04

## 2019-04-11 RX ADMIN — SODIUM CHLORIDE 1000 ML: 0.9 INJECTION, SOLUTION INTRAVENOUS at 07:04

## 2019-04-11 RX ADMIN — SODIUM CHLORIDE: 0.9 INJECTION, SOLUTION INTRAVENOUS at 09:04

## 2019-04-11 RX ADMIN — ERTAPENEM SODIUM 1 G: 1 INJECTION, POWDER, LYOPHILIZED, FOR SOLUTION INTRAMUSCULAR; INTRAVENOUS at 09:04

## 2019-04-11 NOTE — TELEPHONE ENCOUNTER
Received phone call from patient Partner Any stating patient has fever off and on since last night. Fever now 101.0 with Mid back pain 6/10 on pain scale that is getting worse.  Reviewed with DR Mercado and recommends to come to the ER now.  Patient and partner Any  Voice a understanding of recommendations and will present to the ER now.  All appropriate personnel notified.

## 2019-04-11 NOTE — ED PROVIDER NOTES
Encounter Date: 4/11/2019       History     Chief Complaint   Patient presents with    Fever     Pt reports fever intermittently x 2 days.  Highest 101.7.  Pt had kidney tx in January.  Pt currently on IV abx for UTI     59-year-old female with PMHx of DM/HTN nephropathy ESRD s/p living kidney transplant 1/14/2019, recurrent UTIs (recently found to have pansensitive Klebsiella UTI 3/26/2019), and chronic lower back pain who presents to the ED with c/o fever. Pt reports that she has had fever (TMax 101.7 F) for the past 2 days with associated chills, diffuse headache, and cramping to suprapubic region, near surgical incision. She has been taking Tylenol for fever and pain reduction. Pt was admitted 4/2/19-4/5/19 for fevers and UTI.  She had cystourethroscopy done yesterday with Gynecology. She receives 1 g Invanz daily through PICC line for recurrent UTIs (completion date 4/15/19). She reports having mildly malodorous urine, but denies dysuria, hematuria, or dark colored urine. She has chronic mid thoracolumbar pain, for which she has had previous corrective surgeries. She also reports dyspnea on exertion (walking across the room) ever since her transplant, which has been gradually worsening. She has had a mild dry cough for the past few days. Denies chest pain, congestion, vision changes, flank pain, diarrhea, constipation, leg swelling, wound drainage, or any other medical complaints.     The history is provided by the patient.     Review of patient's allergies indicates:   Allergen Reactions    Torsemide Diarrhea     Diarrhea stopped once discontinued med    Codeine Itching     Can take oxycodone and hydrocodone with Benadryl     Past Medical History:   Diagnosis Date    Anemia     Anemia in chronic kidney disease, on chronic dialysis 7/12/2017    Arthritis     Asthma     allergic airway    CKD stage III (moderate) 2/18/2019    Colon polyp     Depression     Diabetes mellitus     Diverticulosis      Eosinophilia     ESRD (end stage renal disease)     stage V, due for living donor 9/2018    ESRD on dialysis     GERD (gastroesophageal reflux disease)     Gout     Gout     Hyperlipidemia     Hypertension     Low back pain     MRSA carrier     Obesity     Renal disorder     Rotator cuff syndrome--left     Thyroid disease     Ulnar neuropathy of right upper extremity     Uncontrolled type 2 diabetes mellitus with hyperglycemia      Past Surgical History:   Procedure Laterality Date    ACHILLES TENDON SURGERY Left May 2015    ARTHROSCOPY, HIP Left 10/3/2013    [facility]    ARTHROSCOPY-HIP WITH TROCHANTERIC BURSECTOMY Left 10/3/2013    [facility] OR    ARTHROSCOPY-SHOULDER Left 11/24/2015    [facility]    ARTHROSCOPY-SHOULDER WITH SUBACROMIAL DECOMPRESSION Left 7/12/2016    [facility] OR    BACK SURGERY      CHOLECYSTECTOMY      COLONOSCOPY N/A 9/6/2017    [facility]    DEBRIDEMENT-SHOULDER-ARTHROSCOPIC Left 7/12/2016    [facility] OR    DEBRIDEMENT-SHOULDER-ARTHROSCOPIC; LABRAL Left 11/24/2015    [facility] OR    DIALYSIS FISTULA CREATION  12/2017    FEMOROPLASTY, ARTHROSCOPIC Left 10/3/2013    [facility] OR    HEMORRHOID SURGERY      INJECTION-AMNIOX Left 7/12/2016    [facility] OR    JOINT REPLACEMENT      left    KNEE SURGERY      LYSIS-ADHESION Left 11/24/2015    [facility]    REPAIR ROTATOR CUFF ARTHROSCOPIC Left 7/12/2016    [facility]    REPAIR-TENDON-BICEP Left 11/24/2015    [facility] OR    SHOULDER ARTHROSCOPY      SHOULDER SURGERY      TRANSPLANT, KIDNEY N/A 1/14/2019    [facility]  OR 2ND [facility]    UPPER GASTROINTESTINAL ENDOSCOPY  ~2013    Dr. Moralez     Family History   Problem Relation Age of Onset    Diabetes Mother     Alzheimer's disease Mother     Thyroid disease Mother     Hyperlipidemia Mother     Heart disease Father     Stroke Father     Diabetes Sister     Lupus Sister     Ovarian cancer Sister     Heart disease Brother     No Known Problems Son     Diabetes Maternal Grandmother     Hypertension Maternal Grandmother     No Known Problems Paternal Grandmother     No Known Problems Paternal Grandfather     COPD Maternal Aunt     Diabetes Maternal Aunt     Heart disease Maternal Aunt     Hypertension Maternal Aunt     No Known Problems Maternal Uncle     No Known Problems Paternal Aunt     No Known Problems Paternal Uncle     Colon cancer Neg Hx     Colon polyps Neg Hx     Crohn's disease Neg Hx     Ulcerative colitis Neg Hx     Celiac disease Neg Hx      Social History     Tobacco Use    Smoking status: Never Smoker    Smokeless tobacco: Never Used   Substance Use Topics    Alcohol use: No    Drug use: No     Review of Systems   Constitutional: Positive for chills and fever. Negative for fatigue.   HENT: Negative for congestion and sore throat.    Eyes: Negative for photophobia and visual disturbance.   Respiratory: Positive for cough (mild dry) and shortness of breath (dyspnea on exertion). Negative for wheezing.    Cardiovascular: Negative for chest pain, palpitations and leg swelling.   Gastrointestinal: Positive for abdominal pain (suprapubic). Negative for constipation, diarrhea, nausea and vomiting.   Genitourinary: Negative for dysuria, flank pain, frequency and hematuria.        +malodorous urine   Musculoskeletal: Negative for back pain (chronic) and neck pain.   Skin: Negative for rash.   Neurological: Positive for headaches. Negative for dizziness, weakness and light-headedness.   Hematological: Does not bruise/bleed easily.    Psychiatric/Behavioral: Negative for confusion.       Physical Exam     Initial Vitals [04/11/19 1654]   BP Pulse Resp Temp SpO2   127/60 82 20 98.2 °F (36.8 °C) 96 %      MAP       --         Physical Exam    Nursing note and vitals reviewed.  Constitutional: She appears well-developed and well-nourished.   HENT:   Head: Normocephalic and atraumatic.   Eyes: Conjunctivae and EOM are normal. Pupils are equal, round, and reactive to light.   Neck: Normal range of motion. Neck supple.   Cardiovascular: Normal rate, regular rhythm and normal heart sounds.   PICC line to right arm, no surrounding erythema or drainage.    Pulmonary/Chest: Breath sounds normal. She has no wheezes. She has no rhonchi. She has no rales.   Abdominal: Soft. There is no tenderness. There is no rebound and no guarding.   Tenderness to palpation to suprapubic region, right > left. Right surgical incision well healing, dry, clean, and intact.    Musculoskeletal: Normal range of motion. She exhibits no edema or tenderness.   No midline C, T, or L spine tenderness to palpation. Full AROM of upper and lower extremities.    Neurological: She is alert and oriented to person, place, and time. She has normal strength.   Strength and sensation intact and symmetric throughout upper and lower extremities.    Skin: Skin is warm. Capillary refill takes less than 2 seconds.   Psychiatric: She has a normal mood and affect.         ED Course   Procedures  Labs Reviewed   CBC W/ AUTO DIFFERENTIAL - Abnormal; Notable for the following components:       Result Value    WBC 2.68 (*)     RBC 2.27 (*)     Hemoglobin 7.0 (*)     Hematocrit 21.2 (*)     RDW 15.9 (*)     nRBC 1 (*)     Gran% 83.0 (*)     Lymph% 8.0 (*)     Mono% 2.0 (*)     All other components within normal limits    Narrative:     ONE LAVENDER SHARED  ADD ON PCAL. SEEMA WALSH PA-C.  04/11/2019  18:38   ADD ON TACRO. SEEMA WALSH PA-C.  04/11/2019  18:41    COMPREHENSIVE METABOLIC PANEL -  Abnormal; Notable for the following components:    Chloride 111 (*)     Glucose 209 (*)     Creatinine 1.6 (*)     Albumin 3.2 (*)     Alkaline Phosphatase 158 (*)     eGFR if  39.8 (*)     eGFR if non  34.5 (*)     All other components within normal limits    Narrative:     ONE LAVENDER SHARED  ADD ON PCAL. PER YESICA WALSH PA-C.  04/11/2019  18:38   ADD ON TACRO. PER YESICA WALSH PA-C.  04/11/2019  18:41    INFLUENZA A & B BY MOLECULAR   CULTURE, BLOOD   CULTURE, BLOOD   LACTIC ACID, PLASMA    Narrative:     ONE LAVENDER SHARED  ADD ON PCAL. PER YESICA WALSH PA-C.  04/11/2019  18:38   ADD ON TACRO. PER YESICA WALSH PA-C.  04/11/2019  18:41    URINALYSIS, REFLEX TO URINE CULTURE    Narrative:     Preferred Collection Type->Urine, Clean Catch   TACROLIMUS LEVEL   PROCALCITONIN   PROCALCITONIN    Narrative:     ONE LAVENDER SHARED  ADD ON PCAL. PER YESICA WALSH PA-C.  04/11/2019  18:38   ADD ON TACRO. PER YESICA WALSH PA-C.  04/11/2019  18:41    B-TYPE NATRIURETIC PEPTIDE   TACROLIMUS LEVEL          Imaging Results          X-Ray Chest AP Portable (Final result)  Result time 04/11/19 18:06:53    Final result by Glenn Sousa MD (04/11/19 18:06:53)                 Impression:      No detrimental change or radiographic acute intrathoracic process seen.  Specifically, no focal consolidation.      Electronically signed by: Glenn Sousa MD  Date:    04/11/2019  Time:    18:06             Narrative:    EXAMINATION:  XR CHEST AP PORTABLE    CLINICAL HISTORY:  Sepsis;    TECHNIQUE:  Single frontal view of the chest was performed.    COMPARISON:  Chest radiograph 1 day prior    FINDINGS:  No detrimental change.  Monitoring leads overlie the chest.  Cardiomediastinal silhouette is midline and within normal limits.  The lungs are well expanded without large consolidation or new focal opacity.  No pleural effusion or pneumothorax.  No acute osseous process seen.  PA and lateral views can  be obtained.                                 Medical Decision Making:   Initial Assessment:   59-year-old female with PMHx of DM/HTN nephropathy ESRD s/p living kidney transplant 1/14/2019, recurrent UTIs (recently found to have pansensitive Klebsiella UTI 3/26/2019), and chronic lower back pain who presents to the ED with c/o fever x 2 days. Pt has been taking Tylenol for fever reduction. She has associated headaches, chills, and suprapubic cramping. Admitted 4/2/19-4/5/19 for fevers and UTI.  She had cystourethroscopy done yesterday with Gynecology. She receives 1 g Invanz daily through PICC line for recurrent UTIs (completion date 4/15/19). Pt is afebrile upon arrival to ED, but reports taking Tylenol 2.5 hours ago. Vital signs are stable. RRR. Lungs CTA bilaterally. Suprapubic TTP, worse over surgical incision site. PICC line to RUE.   Differential Diagnosis:   DDx includes but is not limited sepsis, UTI, influenza, pneumonia, infectious process, immunosuppressed status, transplant rejection, CHF exacerbation.   Clinical Tests:   Lab Tests: Ordered and Reviewed  Radiological Study: Ordered and Reviewed  Medical Tests: Ordered and Reviewed  ED Management:  In immunosuppressed pt s/p transplant 3 months ago, will obtain septic work up, including basic labs, lactic acid, procalcitonin, blood cultures, UA, CXR, and prograf levels. Will also check BNP in patient c/o BOYER for the past few months. Consult placed to Kidney transplant service.     Discussed with Kidney Transplant, who will come evaluate the patient with plans to start patient on IV Antibiotics.     CXR without evidence of acute cardiopulmonary process. Influenza negative. Lactic acid 1.2. Procalcitonin 0.07. WBC 2.68. Hemoglobin 7.0, steadily trending down from previous values. BUN 19. Cr 1.6 elevated from 1.1 two days ago. Will start patient on 1 L IVFs.    Patient was admitted to the Kidney Transplant Service. Pt continues to be afebrile and is stable  for admission. Discussed plan with patient who is agreeable.     I have discussed the treatment and management of this patient with my supervising physician, and we agree on the plan of care.      Kamille Jara PA-C                      ED Course as of Apr 11 1954   Thu Apr 11, 2019   1814 EKG interpretation by ED physician:  Normal sinus rhythm, rate 67, no ST changes, no ischemia, normal intervals.  Compared to prior EKG from 04/2019, grossly stable without significant change.   EKG 12-lead [SS]      ED Course User Index  [SS] Lavelle Sellers MD     Clinical Impression:       ICD-10-CM ICD-9-CM   1. S/P kidney transplant Z94.0 V42.0   2. Fever R50.9 780.60   3. Abdominal pain, unspecified abdominal location R10.9 789.00   4. Immunosuppressed status D89.9 279.9   5. Function kidney decreased N28.9 593.9         Disposition:   Disposition: Admitted  Condition: Stable                          Kamille Jara PA-C  04/11/19 1906       Kamille Jara PA-C  04/11/19 1954

## 2019-04-11 NOTE — TELEPHONE ENCOUNTER
Spoke with Pt's significant other. She reports Pt has a persistent temp of 100.5-100.1 Concerned why. Spoke with LAM Hendricks RN,,[facility] and he will speak with Dr Mercado about potential treatment plan.

## 2019-04-11 NOTE — TELEPHONE ENCOUNTER
4/10 C Xray and UPC/R results reviewed with patient.  Patient states she had a fever last night but was not seen in the ER due to long wait times. Patient temp this morning was 98.0 . Patient voice a understanding to present to the ER is temp returns >100.4 and keep coordinator posted.

## 2019-04-11 NOTE — ED TRIAGE NOTES
Pt arrived POV from home. Pt reports generalized weakness, difficulty walking, fevers over the past few days. LLQ tenderness and constant lower lumber pain radiates left to right. Denies SOB or Nausea and diarrhea.

## 2019-04-12 ENCOUNTER — PATIENT MESSAGE (OUTPATIENT)
Dept: UROGYNECOLOGY | Facility: CLINIC | Age: 59
End: 2019-04-12

## 2019-04-12 PROBLEM — Z87.440 HISTORY OF RECURRENT URINARY TRACT INFECTION: Status: ACTIVE | Noted: 2019-04-12

## 2019-04-12 PROBLEM — R50.9 FEVER, UNSPECIFIED: Status: ACTIVE | Noted: 2019-04-12

## 2019-04-12 LAB
ABO + RH BLD: NORMAL
ALBUMIN SERPL BCP-MCNC: 2.9 G/DL (ref 3.5–5.2)
ANION GAP SERPL CALC-SCNC: 8 MMOL/L (ref 8–16)
ANISOCYTOSIS BLD QL SMEAR: SLIGHT
BASO STIPL BLD QL SMEAR: ABNORMAL
BASOPHILS # BLD AUTO: ABNORMAL K/UL (ref 0–0.2)
BASOPHILS NFR BLD: 1 % (ref 0–1.9)
BLD GP AB SCN CELLS X3 SERPL QL: NORMAL
BLD PROD TYP BPU: NORMAL
BLOOD UNIT EXPIRATION DATE: NORMAL
BLOOD UNIT TYPE CODE: 5100
BLOOD UNIT TYPE: NORMAL
BUN SERPL-MCNC: 15 MG/DL (ref 8–23)
CALCIUM SERPL-MCNC: 8.3 MG/DL (ref 8.7–10.5)
CHLORIDE SERPL-SCNC: 115 MMOL/L (ref 95–110)
CO2 SERPL-SCNC: 22 MMOL/L (ref 23–29)
CODING SYSTEM: NORMAL
CREAT SERPL-MCNC: 1.2 MG/DL (ref 0.5–1.4)
DIFFERENTIAL METHOD: ABNORMAL
DISPENSE STATUS: NORMAL
EOSINOPHIL # BLD AUTO: ABNORMAL K/UL (ref 0–0.5)
EOSINOPHIL NFR BLD: 4 % (ref 0–8)
ERYTHROCYTE [DISTWIDTH] IN BLOOD BY AUTOMATED COUNT: 15.9 % (ref 11.5–14.5)
EST. GFR  (AFRICAN AMERICAN): 56.4 ML/MIN/1.73 M^2
EST. GFR  (NON AFRICAN AMERICAN): 48.9 ML/MIN/1.73 M^2
GLUCOSE SERPL-MCNC: 100 MG/DL (ref 70–110)
HCT VFR BLD AUTO: 20.7 % (ref 37–48.5)
HGB BLD-MCNC: 6.7 G/DL (ref 12–16)
HYPOCHROMIA BLD QL SMEAR: ABNORMAL
IMM GRANULOCYTES # BLD AUTO: ABNORMAL K/UL (ref 0–0.04)
IMM GRANULOCYTES NFR BLD AUTO: ABNORMAL % (ref 0–0.5)
LYMPHOCYTES # BLD AUTO: ABNORMAL K/UL (ref 1–4.8)
LYMPHOCYTES NFR BLD: 11 % (ref 18–48)
MCH RBC QN AUTO: 30.5 PG (ref 27–31)
MCHC RBC AUTO-ENTMCNC: 32.4 G/DL (ref 32–36)
MCV RBC AUTO: 94 FL (ref 82–98)
METAMYELOCYTES NFR BLD MANUAL: 1 %
MONOCYTES # BLD AUTO: ABNORMAL K/UL (ref 0.3–1)
MONOCYTES NFR BLD: 10.5 % (ref 4–15)
MYELOCYTES NFR BLD MANUAL: 0.5 %
NEUTROPHILS NFR BLD: 69.5 % (ref 38–73)
NEUTS BAND NFR BLD MANUAL: 2.5 %
NRBC BLD-RTO: 1 /100 WBC
NUM UNITS TRANS PACKED RBC: NORMAL
PHOSPHATE SERPL-MCNC: 3.5 MG/DL (ref 2.7–4.5)
PLATELET # BLD AUTO: 157 K/UL (ref 150–350)
PLATELET BLD QL SMEAR: ABNORMAL
PMV BLD AUTO: 10.5 FL (ref 9.2–12.9)
POCT GLUCOSE: 125 MG/DL (ref 70–110)
POCT GLUCOSE: 166 MG/DL (ref 70–110)
POCT GLUCOSE: 200 MG/DL (ref 70–110)
POCT GLUCOSE: 72 MG/DL (ref 70–110)
POTASSIUM SERPL-SCNC: 4 MMOL/L (ref 3.5–5.1)
RBC # BLD AUTO: 2.2 M/UL (ref 4–5.4)
SODIUM SERPL-SCNC: 145 MMOL/L (ref 136–145)
TACROLIMUS BLD-MCNC: 12.3 NG/ML (ref 5–15)
TACROLIMUS BLD-MCNC: 12.8 NG/ML (ref 5–15)
WBC # BLD AUTO: 2.46 K/UL (ref 3.9–12.7)

## 2019-04-12 PROCEDURE — 99233 SBSQ HOSP IP/OBS HIGH 50: CPT | Mod: ,,, | Performed by: PHYSICIAN ASSISTANT

## 2019-04-12 PROCEDURE — 87086 URINE CULTURE/COLONY COUNT: CPT | Mod: 59

## 2019-04-12 PROCEDURE — 25000003 PHARM REV CODE 250: Performed by: STUDENT IN AN ORGANIZED HEALTH CARE EDUCATION/TRAINING PROGRAM

## 2019-04-12 PROCEDURE — 86901 BLOOD TYPING SEROLOGIC RH(D): CPT

## 2019-04-12 PROCEDURE — 63600175 PHARM REV CODE 636 W HCPCS: Performed by: PHYSICIAN ASSISTANT

## 2019-04-12 PROCEDURE — 36430 TRANSFUSION BLD/BLD COMPNT: CPT

## 2019-04-12 PROCEDURE — 99223 1ST HOSP IP/OBS HIGH 75: CPT | Mod: GC,,, | Performed by: INTERNAL MEDICINE

## 2019-04-12 PROCEDURE — 99223 PR INITIAL HOSPITAL CARE,LEVL III: ICD-10-PCS | Mod: GC,,, | Performed by: INTERNAL MEDICINE

## 2019-04-12 PROCEDURE — P9016 RBC LEUKOCYTES REDUCED: HCPCS

## 2019-04-12 PROCEDURE — 87086 URINE CULTURE/COLONY COUNT: CPT

## 2019-04-12 PROCEDURE — 80197 ASSAY OF TACROLIMUS: CPT

## 2019-04-12 PROCEDURE — 87632 RESP VIRUS 6-11 TARGETS: CPT

## 2019-04-12 PROCEDURE — 25000003 PHARM REV CODE 250: Performed by: PHYSICIAN ASSISTANT

## 2019-04-12 PROCEDURE — 99233 PR SUBSEQUENT HOSPITAL CARE,LEVL III: ICD-10-PCS | Mod: ,,, | Performed by: PHYSICIAN ASSISTANT

## 2019-04-12 PROCEDURE — 63600175 PHARM REV CODE 636 W HCPCS: Performed by: STUDENT IN AN ORGANIZED HEALTH CARE EDUCATION/TRAINING PROGRAM

## 2019-04-12 PROCEDURE — 86920 COMPATIBILITY TEST SPIN: CPT

## 2019-04-12 PROCEDURE — 20600001 HC STEP DOWN PRIVATE ROOM

## 2019-04-12 PROCEDURE — 80069 RENAL FUNCTION PANEL: CPT

## 2019-04-12 RX ORDER — HYDROCODONE BITARTRATE AND ACETAMINOPHEN 500; 5 MG/1; MG/1
TABLET ORAL
Status: DISCONTINUED | OUTPATIENT
Start: 2019-04-12 | End: 2019-04-13 | Stop reason: HOSPADM

## 2019-04-12 RX ORDER — SODIUM CHLORIDE 9 MG/ML
INJECTION, SOLUTION INTRAVENOUS CONTINUOUS
Status: DISCONTINUED | OUTPATIENT
Start: 2019-04-12 | End: 2019-04-12

## 2019-04-12 RX ORDER — TACROLIMUS 1 MG/1
1 CAPSULE ORAL 2 TIMES DAILY
Status: DISCONTINUED | OUTPATIENT
Start: 2019-04-12 | End: 2019-04-13 | Stop reason: HOSPADM

## 2019-04-12 RX ORDER — SODIUM CHLORIDE 450 MG/100ML
INJECTION, SOLUTION INTRAVENOUS CONTINUOUS
Status: ACTIVE | OUTPATIENT
Start: 2019-04-12 | End: 2019-04-12

## 2019-04-12 RX ADMIN — INSULIN ASPART 10 UNITS: 100 INJECTION, SOLUTION INTRAVENOUS; SUBCUTANEOUS at 06:04

## 2019-04-12 RX ADMIN — LORAZEPAM 1 MG: 1 TABLET ORAL at 08:04

## 2019-04-12 RX ADMIN — FAMOTIDINE 20 MG: 20 TABLET, FILM COATED ORAL at 08:04

## 2019-04-12 RX ADMIN — SODIUM CHLORIDE: 0.9 INJECTION, SOLUTION INTRAVENOUS at 06:04

## 2019-04-12 RX ADMIN — LEVOTHYROXINE SODIUM 75 MCG: 75 TABLET ORAL at 06:04

## 2019-04-12 RX ADMIN — SODIUM CHLORIDE: 0.45 INJECTION, SOLUTION INTRAVENOUS at 12:04

## 2019-04-12 RX ADMIN — INSULIN ASPART 10 UNITS: 100 INJECTION, SOLUTION INTRAVENOUS; SUBCUTANEOUS at 11:04

## 2019-04-12 RX ADMIN — MAGNESIUM OXIDE TAB 400 MG (241.3 MG ELEMENTAL MG) 800 MG: 400 (241.3 MG) TAB at 08:04

## 2019-04-12 RX ADMIN — VILAZODONE HYDROCHLORIDE 40 MG: 10 TABLET ORAL at 11:04

## 2019-04-12 RX ADMIN — TACROLIMUS 1 MG: 1 CAPSULE ORAL at 06:04

## 2019-04-12 RX ADMIN — OXYBUTYNIN CHLORIDE 10 MG: 10 TABLET, FILM COATED, EXTENDED RELEASE ORAL at 08:04

## 2019-04-12 RX ADMIN — TACROLIMUS 1.5 MG: 1 CAPSULE ORAL at 08:04

## 2019-04-12 RX ADMIN — INSULIN DETEMIR 30 UNITS: 100 INJECTION, SOLUTION SUBCUTANEOUS at 08:04

## 2019-04-12 RX ADMIN — ERTAPENEM SODIUM 1 G: 1 INJECTION, POWDER, LYOPHILIZED, FOR SOLUTION INTRAMUSCULAR; INTRAVENOUS at 08:04

## 2019-04-12 NOTE — ASSESSMENT & PLAN NOTE
-s/p LUKTx (campath induction, CMV D+R-) for ESRD 2/2 HTN and DM on 1/14/19  -post op course notable for recurrent ESBL klebsiella UTIs  -Cr baseline at 1.2 upon admission  -strict Is and Os   -kidney US pending  -continue to monitor

## 2019-04-12 NOTE — ASSESSMENT & PLAN NOTE
-continue prograf  -cellcept on hold  -continue to monitor for side effects and daily levels. Will adjust for therapeutic dose

## 2019-04-12 NOTE — PLAN OF CARE
Problem: Adult Inpatient Plan of Care  Goal: Plan of Care Review  Outcome: Ongoing (interventions implemented as appropriate)  POC reviewed w/ patient who verbalized understanding.  AAOx4, vss on room air.  2000 valentina ADA diet.  No c/o nausea.  Fluids infusing per MAR.  Up to the bathroom independently.  No c/o pain this shift.  Patient is resting w/ call light in reach.  No falls or injuries this shift.  WCTM.

## 2019-04-12 NOTE — ASSESSMENT & PLAN NOTE
-pt with history of fever x 2 days prior to admission while on invanz for UTI, tmax 101.7  -pt afebrile since admission and VSS, no leukocytosis  -UA clean, urine culture and blood cultures pending  -CXR clear, flu negative  -kidney US pending  -continue to invanz  -continue to monitor

## 2019-04-12 NOTE — HPI
58 y/o F with PMHx of DM/HTN nephropathy ESRD s/p living kidney transplant 1/14/2019, recurrent ESBL klebsiella UTIs (recently found to have pansensitive Klebsiella UTI 3/26/2019), and chronic lower back pain who presents to the ED with c/o fever. Pt reports that she has had fever (TMax 101.7 F) for the past 2 days with associated chills, diffuse headache, and cramping to suprapubic region, near surgical incision. She has been taking Tylenol for fever and pain reduction. Pt was admitted 4/2/19-4/5/19 for fevers and UTI.  She had cystourethroscopy done yesterday with Gynecology. She receives 1 g Invanz daily through PICC line for recurrent UTIs (completion date 4/15/19). She reports having mildly malodorous urine, but denies dysuria, hematuria, or dark colored urine. She has chronic mid thoracolumbar pain, for which she has had previous corrective surgeries. She also reports dyspnea on exertion (walking across the room) ever since her transplant, which has been gradually worsening. She has had a mild dry cough for the past few days. Denies chest pain, congestion, vision changes, flank pain, diarrhea, constipation, leg swelling, wound drainage, or any other medical complaints.

## 2019-04-12 NOTE — ASSESSMENT & PLAN NOTE
-pt with recurrent ESBL klebsiella UTIs since KTx, denies history of UTIs prior to KTx  -stent removed 2/2019  -most recent UTI with pansensitive klebsiella, on invanz x 2 weeks (end date 4/15)  -urethrocystoscopy on 4/11 WNL  -UA on admission clean, urine cx pending  -continue invanz for now  -ID consulted, appreciate recs

## 2019-04-12 NOTE — ASSESSMENT & PLAN NOTE
60 y/o female s/p OLTx recently admitted for UTI presents to ED with fevers, fatigue and suprapubic pain.     Admit to kidney transplant  Diabetic diet  Continue home insulin regimen   Continue prescribed Ertapenem   ID consult  mIVF   F/u infectious workup

## 2019-04-12 NOTE — ASSESSMENT & PLAN NOTE
- Recurrent ESBL/Klebsiella UTI s/p KTx (1/2019, on tacro 1.5mg bid at home)  - Presents to ED c/o subjective fevers, fatigue, malaise, and malodorous urine at home  - Afebrile throughout hospital stay, BCx negative, no elevated inflammatory markers, UA is pristine, no further urinary symptoms, currently on ertapenem     Recommendations  - F/U high risk urine cx  - RVP  - Continue current 14 day course ertapenem 1g q24 thru 4/15  - Continue immunosuppressive medication per KTM  - Given patient's emphasis on generalized symptoms of fatigue and malaise, high suspicion this is largely related to worsening anemia of Hb now 6.7. Consider transfusion for symptomatic anemia <7.0

## 2019-04-12 NOTE — CONSULTS
[facility]  Kidney Transplant  Consult Note    Inpatient consult to Kidney Transplant Surgery  Consult performed by: Adelaide Agarwal PA-C  Consult ordered by: Kamille Jara PA-C  Reason for consult: s/p KTx with fever          Subjective:   History of Present Illness:  60 y/o F with PMHx of DM/HTN nephropathy ESRD s/p living kidney transplant 1/14/2019, recurrent ESBL klebsiella UTIs (recently found to have pansensitive Klebsiella UTI 3/26/2019), and chronic lower back pain who presents to the ED with c/o fever. Pt reports that she has had fever (TMax 101.7 F) for the past 2 days with associated chills, diffuse headache, and cramping to suprapubic region, near surgical incision. She has been taking Tylenol for fever and pain reduction. Pt was admitted 4/2/19-4/5/19 for fevers and UTI.  She had cystourethroscopy done yesterday with Gynecology. She receives 1 g Invanz daily through PICC line for recurrent UTIs (completion date 4/15/19). She reports having mildly malodorous urine, but denies dysuria, hematuria, or dark colored urine. She has chronic mid thoracolumbar pain, for which she has had previous corrective surgeries. She also reports dyspnea on exertion (walking across the room) ever since her transplant, which has been gradually worsening. She has had a mild dry cough for the past few days. Denies chest pain, congestion, vision changes, flank pain, diarrhea, constipation, leg swelling, wound drainage, or any other medical complaints.         Ms. Newell is a 59 y.o. year old female who is status post Kidney Transplant - 1/14/2019  (#1).  Her maintenance immunosuppression consists of:   Immunosuppressants (From admission, onward)    Start     Stop Route Frequency Ordered    04/12/19 1800  tacrolimus capsule 1 mg      -- Oral 2 times daily 04/12/19 0921          Hospital Course:  60 y/o F s/p DBD KTx for ESRD 2/2 DM and HTN on 1/14/19 admitted for fever on 4/11. Pt afebrile, VSS, no  leukocytosis, Cr at baseline. Pt with 101.7 fever while on invanz for pansensitive klebsiella UTI noted on urine culture on 3/26. Of note, pt had urethrocystoscopy for recurrent UTIs on 4/11 which was normal. Pt denies history of UTIs prior to KTx. UA on admission was clean; urine culture and blood culture pending. Pt continued on invanz and started on IVF. This AM pt reports continued suprapubic pain and mild dysuria. Labs reveal mild hypernatremia. IVF stopped and pt given 1/2 NS bolus. ID consulted for recurrent UTIs. Kidney US pending. Continue invanz for now. CMV PCR in AM. Monitor.      Past Medical, Surgical, Family, and Social History:   Unchanged from H&P.    Scheduled Meds:   ertapenem (INVANZ) 1 g in sodium chloride 0.9 % 100 mL IVPB (ready to mix system)  1 g Intravenous Q24H    famotidine  20 mg Oral BID    insulin aspart U-100  10 Units Subcutaneous TIDWM    insulin detemir U-100  30 Units Subcutaneous QHS    INV acyclovir/placebo  400 mg Oral Q12H    INV MK-8228/placebo  480 mg Oral Daily    INV valganciclovir/placebo  900 mg Oral Daily    levothyroxine  75 mcg Oral Before breakfast    LORazepam  1 mg Oral BID    magnesium oxide  800 mg Oral Daily    oxybutynin  10 mg Oral Daily    tacrolimus  1 mg Oral BID    vilazodone  40 mg Oral Daily     Continuous Infusions:   sodium chloride 0.45% 100 mL/hr at 04/12/19 1230     PRN Meds:    Intake/Output - Last 3 Shifts       04/10 0700 - 04/11 0659 04/11 0700 - 04/12 0659 04/12 0700 - 04/13 0659    Urine (mL/kg/hr)  600 150 (0.2)    Stool  0     Total Output  600 150    Net  -600 -150           Stool Occurrence  0 x            Review of Systems   Constitutional: Positive for activity change, chills, fatigue and fever ( now resolved).   HENT: Negative for congestion and sore throat.    Eyes: Negative for pain and redness.   Respiratory: Negative for cough and shortness of breath.    Cardiovascular: Negative for chest pain, palpitations and leg  "swelling.   Gastrointestinal: Positive for abdominal pain. Negative for abdominal distention, constipation, diarrhea, nausea and vomiting.   Genitourinary: Negative for flank pain and hematuria.   Musculoskeletal: Negative for back pain and gait problem.   Skin: Negative for rash and wound.   Neurological: Negative for light-headedness, numbness and headaches.   Hematological: Does not bruise/bleed easily.   Psychiatric/Behavioral: Negative for confusion. The patient is not nervous/anxious.       Objective:     Vital Signs (Most Recent):  Temp: 98.4 °F (36.9 °C) (04/12/19 0725)  Pulse: 95 (04/12/19 1054)  Resp: 16 (04/12/19 0725)  BP: 135/66 (04/12/19 0725)  SpO2: 95 % (04/12/19 0725) Vital Signs (24h Range):  Temp:  [97.8 °F (36.6 °C)-98.4 °F (36.9 °C)] 98.4 °F (36.9 °C)  Pulse:  [56-95] 95  Resp:  [16-20] 16  SpO2:  [95 %-98 %] 95 %  BP: (121-140)/(58-68) 135/66     Weight: 95.7 kg (211 lb)  Height: 5' 7" (170.2 cm)  Body mass index is 33.05 kg/m².    Physical Exam   Constitutional: She is oriented to person, place, and time. She appears well-developed and well-nourished.   HENT:   Head: Normocephalic and atraumatic.   Eyes: Conjunctivae and EOM are normal.   Neck: Normal range of motion. Neck supple. No tracheal deviation present.   Cardiovascular: Normal rate and regular rhythm.   Pulmonary/Chest: Effort normal. No respiratory distress.   Abdominal: Soft. She exhibits no distension. There is tenderness (suprapubic).   Well healed right kidney transplant incision   Musculoskeletal: Normal range of motion. She exhibits no edema.   Neurological: She is alert and oriented to person, place, and time.   Skin: Skin is warm and dry. She is not diaphoretic.   Psychiatric: She has a normal mood and affect.   Vitals reviewed.      Laboratory:  CBC:   Recent Labs   Lab 04/09/19  1044 04/11/19  1800 04/12/19  0400   WBC 5.65 2.68* 2.46*   RBC 2.63* 2.27* 2.20*   HGB 8.1* 7.0* 6.7*   HCT 24.5* 21.2* 20.7*    153 157 "   MCV 93 93 94   MCH 30.8 30.8 30.5   MCHC 33.1 33.0 32.4     CMP:   Recent Labs   Lab 04/09/19  1044 04/11/19  1800 04/12/19  0400   * 209* 100   CALCIUM 9.2 8.9 8.3*   ALBUMIN 3.6 3.2* 2.9*   PROT  --  6.2  --     143 145   K 4.2 4.4 4.0   CO2 23 24 22*   * 111* 115*   BUN 14 19 15   CREATININE 1.1 1.6* 1.2   ALKPHOS  --  158*  --    ALT  --  31  --    AST  --  34  --      Labs within the past 24 hours have been reviewed.    Diagnostic Results:  None    Assessment/Plan:     * Fever, unspecified  -pt with history of fever x 2 days prior to admission while on invanz for UTI, tmax 101.7  -pt afebrile since admission and VSS, no leukocytosis  -UA clean, urine culture and blood cultures pending  -CXR clear, flu negative  -kidney US pending  -continue to invanz  -continue to monitor      History of recurrent urinary tract infection  -pt with recurrent ESBL klebsiella UTIs since KTx, denies history of UTIs prior to KTx  -stent removed 2/2019  -most recent UTI with pansensitive klebsiella, on invanz x 2 weeks (end date 4/15)  -urethrocystoscopy on 4/11 WNL  -UA on admission clean, urine cx pending  -continue invanz for now  -ID consulted, appreciate recs      S/P kidney transplant  -s/p LUKTx (campath induction, CMV D+R-) for ESRD 2/2 HTN and DM on 1/14/19  -post op course notable for recurrent ESBL klebsiella UTIs  -Cr baseline at 1.2 upon admission  -strict Is and Os   -kidney US pending  -continue to monitor    At risk for opportunistic infections  -CMV D-R+  -CMV study drugs ordered      Long-term use of immunosuppressant medication  -see prophylactic immuno      Prophylactic immunotherapy  -continue prograf  -cellcept on hold  -continue to monitor for side effects and daily levels. Will adjust for therapeutic dose          Discharge Planning:  Pt not a candidate for discharge at this time      Adelaide Agarwal PA-C  Kidney Transplant  [facility]

## 2019-04-12 NOTE — PROGRESS NOTES
[facility]  Infectious Disease  Progress Note    Patient Name: Andra Newell  MRN: [id number]  Admission Date: 4/11/2019  Length of Stay: 1 days  Attending Physician: Yonny Fuentes MD  Primary Care Provider: Gita Cohen MD    Isolation Status: No active isolations  Assessment/Plan:      S/P kidney transplant  - Recurrent ESBL/Klebsiella UTI s/p KTx (1/2019, on tacro 1.5mg bid at home)  - Presents to ED c/o subjective fevers, fatigue, malaise, and malodorous urine at home  - Afebrile throughout hospital stay, BCx negative, no elevated inflammatory markers, UA is pristine, no further urinary symptoms, currently on ertapenem     Recommendations  - F/U high risk urine cx  - RVP  - Continue current 14 day course ertapenem 1g q24 thru 4/15  - Continue immunosuppressive medication per KTM  - Given patient's emphasis on generalized symptoms of fatigue and malaise, high suspicion this is largely related to worsening anemia of Hb now 6.7. Consider transfusion for symptomatic anemia <7.0          Thank you for your consult. I will follow-up with patient. Please contact us if you have any additional questions.    Ramiro Olvera MD  Infectious Disease  [facility]    Subjective:     Principal Problem:Fever, unspecified    HPI: Consult: Recurrent klebsiella UTI s/p KTx    59F PMHx HTN, HLD, DM2 c/b ESRD s/p KTx (Living donor D+/R- 1/14/2019), anemia (EPO injections). Presents to ED c/o subjective fevers, weakness, malaise, lack of energy, malodorous urine, 1 month of non-bloody diarrhea and a few days of dry cough. She has presented for multiple recurrent Klebsiella UTIs, most recently 4/2-4/5 where she was admitted and followed by ID, having received a 2 days of cefepime and then was transitioned to 14 day course ertapenem 1g q24hr via PICC which is scheduled to end 4/15. She also received a cystourethroscopy during previous admission that was unremarkable. UCx 2/27 positive for ESBL  klebsiella, UCx 3/26 positive for pansensitive klebsiella, UCx 4/2 negative. 4/10 UA completely normal but high-risk UCx re-sent. BCx have been consistently negative 4/1 + 4/11. Influenza negative, stool studies negative, C.Diff negative.     On presentation pt is HDS, has been afebrile since admission 4/11 with last documented fever 4/1 TMax of 38.4. She is leukopenic to 2.5, LA 1.2, Procal 0.07, Hb 6.7. She is continuing on previously prescribed 14d course ertapenem scheduled thru 4/15. Currently on tacro 1mg bid in hospital (4/12 level 12.8) was on 1.5mg bid at home.  Past Medical History:   Diagnosis Date    Anemia     Anemia in chronic kidney disease, on chronic dialysis 7/12/2017    Arthritis     Asthma     allergic airway    CKD stage III (moderate) 2/18/2019    Colon polyp     Depression     Diabetes mellitus     Diverticulosis     Eosinophilia     ESRD (end stage renal disease)     stage V, due for living donor 9/2018    ESRD on dialysis     GERD (gastroesophageal reflux disease)     Gout     Gout     Hyperlipidemia     Hypertension     Low back pain     MRSA carrier     Obesity     Renal disorder     Rotator cuff syndrome--left     Thyroid disease     Ulnar neuropathy of right upper extremity     Uncontrolled type 2 diabetes mellitus with hyperglycemia        Past Surgical History:   Procedure Laterality Date    ACHILLES TENDON SURGERY Left May 2015    ARTHROSCOPY, HIP Left 10/3/2013    [facility] OR    ARTHROSCOPY-HIP WITH TROCHANTERIC BURSECTOMY Left 10/3/2013    [facility] at [facility] OR    ARTHROSCOPY-SHOULDER Left 11/24/2015    [facility] at [facility] OR    ARTHROSCOPY-SHOULDER WITH SUBACROMIAL DECOMPRESSION Left 7/12/2016    [facility] at [facility] OR    BACK SURGERY      CHOLECYSTECTOMY      COLONOSCOPY N/A 9/6/2017    [facility]O     DEBRIDEMENT-SHOULDER-ARTHROSCOPIC Left 7/12/2016    Performed by [facility] at [facility] OR    DEBRIDEMENT-SHOULDER-ARTHROSCOPIC; LABRAL Left 11/24/2015    Per[facility] at [facility] OR    DIALYSIS FISTULA CREATION  12/2017    FEMOROPLASTY, ARTHROSCOPIC Left 10/3/2013    [facility] at [facility] OR    HEMORRHOID SURGERY      INJECTION-AMNIOX Left 7/12/2016    [facility] at [facility]    JOINT REPLACEMENT      left    KNEE SURGERY      LYSIS-ADHESION Left 11/24/2015    [facility] at [facility]    REPAIR ROTATOR CUFF ARTHROSCOPIC Left 7/12/2016    Performed [facility] at [facility]    REPAIR-TENDON-BICEP Left 11/24/2015    Per[facility] at [facility]    SHOULDER ARTHROSCOPY      SHOULDER SURGERY      TRANSPLANT, KIDNEY N/A 1/14/2019    [facility]    UPPER GASTROINTESTINAL ENDOSCOPY  ~2013     [place]       Review of patient's allergies indicates:   Allergen Reactions    Torsemide Diarrhea     Diarrhea stopped once discontinued med    Codeine Itching     Can take oxycodone and hydrocodone with Benadryl       Medications:  Medications Prior to Admission   Medication Sig    ergocalciferol (ERGOCALCIFEROL) 50,000 unit Cap Take 1 capsule (50,000 Units total) by mouth every 7 days. Take on Tues    ertapenem sodium (ERTAPENEM, INVANZ, 1 G/100 ML NS, READY TO MIX,) Inject 100 mLs (1 g total) into the vein once daily. Stop: 4/15/19    estradiol (ESTRACE) 0.01 % (0.1 mg/gram) vaginal cream Place 0.5 g vaginally twice a week. Apply to the vagina daily for two weeks then twice a week.    famotidine (PEPCID) 20 MG tablet Take 1 tablet (20 mg total) by mouth 2 (two) times daily.    flash glucose scanning reader (FREESTYLE DAKOTAH 14 DAY READER) Misc Dispense 1 reader    flash glucose sensor (FREESTYLE DAKOTAH 14 DAY SENSOR) Kit Uses 1 kit monthly    insulin (BASAGLAR KWIKPEN U-100  "INSULIN) glargine 100 units/mL (3mL) SubQ pen Inject 30 Units into the skin every evening.    insulin aspart U-100 (NOVOLOG FLEXPEN U-100 INSULIN) 100 unit/mL InPn pen Novolog 10 u AC + correction Max TDD 40u    INV acyclovir/placebo capsule Take 1 capsule (400 mg total) by mouth every 12 (twelve) hours.  For investigational use only. 0238-72337. Protocol: MK 8228-002    INV MK-8228/placebo 480 mg oral tablet Take 1 tablet (480 mg total) by mouth once daily. FOR INVESTIGATIONAL USE ONLY. Patient Study# [id number]. Protocol: MK 8228-002    INV valganciclovir/placebo tablet Take 2 tablets (900 mg total) by mouth once daily.  FOR INVESTIGATIONAL USE ONLY. [id number]. Protocol: MK 8228-002    levothyroxine (SYNTHROID) 75 MCG tablet TAKE ONE TABLET BY MOUTH ONCE DAILY    LORazepam (ATIVAN) 2 MG Tab Take 1 tablet (2 mg total) by mouth 2 (two) times daily. (Patient taking differently: Take 1 mg by mouth 2 (two) times daily. )    magnesium oxide (MAG-OX) 400 mg (241.3 mg magnesium) tablet Take 2 tablets (800 mg total) by mouth once daily.    multivitamin (THERAGRAN) tablet Take 1 tablet by mouth once daily.    oxybutynin (DITROPAN-XL) 10 MG 24 hr tablet Take 1 tablet (10 mg total) by mouth once daily.    pen needle, diabetic (BD ULTRA-FINE MINI PEN NEEDLE) 31 gauge x 3/16" Ndle USES 4  DAILY    rosuvastatin (CRESTOR) 10 MG tablet Take 1 tablet (10 mg total) by mouth once daily.    tacrolimus (PROGRAF) 0.5 MG Cap Take 3 capsules (1.5 mg total) by mouth every morning AND 3 capsules (1.5 mg total) every evening.    VIIBRYD 40 mg Tab tablet TAKE ONE TABLET BY MOUTH ONCE DAILY     Antibiotics (From admission, onward)    Start     Stop Route Frequency Ordered    04/11/19 2100  ertapenem (INVANZ) 1 g in sodium chloride 0.9 % 100 mL IVPB (ready to mix system)      -- IV Every 24 hours (non-standard times) 04/11/19 2007        Antifungals (From admission, onward)    None        Antivirals (From admission, onward)        " Stop Route Frequency     INV acyclovir/placebo      -- Oral Every 12 hours     INV valganciclovir/placebo      -- Oral Daily           Immunization History   Administered Date(s) Administered    Hepatitis A / Hepatitis B 07/12/2017, 08/14/2017, 01/08/2018    Influenza 03/15/2017    PPD Test 04/13/2018    Pneumococcal Conjugate - 13 Valent 03/15/2017    Tdap 07/12/2017       Family History     Problem Relation (Age of Onset)    Alzheimer's disease Mother    COPD Maternal Aunt    Diabetes Mother, Sister, Maternal Grandmother, Maternal Aunt    Heart disease Father, Brother, Maternal Aunt    Hyperlipidemia Mother    Hypertension Maternal Grandmother, Maternal Aunt    Lupus Sister    No Known Problems Son, Paternal Grandmother, Paternal Grandfather, Maternal Uncle, Paternal Aunt, Paternal Uncle    Ovarian cancer Sister    Stroke Father    Thyroid disease Mother        Social History     Socioeconomic History    Marital status: Significant Other     Spouse name: Not on file    Number of children: 2    Years of education: Not on file    Highest education level: Not on file   Occupational History    Occupation: retired     Comment:    Social Needs    Financial resource strain: Not on file    Food insecurity:     Worry: Not on file     Inability: Not on file    Transportation needs:     Medical: Not on file     Non-medical: Not on file   Tobacco Use    Smoking status: Never Smoker    Smokeless tobacco: Never Used   Substance and Sexual Activity    Alcohol use: No    Drug use: No    Sexual activity: Never   Lifestyle    Physical activity:     Days per week: Not on file     Minutes per session: Not on file    Stress: Not on file   Relationships    Social connections:     Talks on phone: Not on file     Gets together: Not on file     Attends Religion service: Not on file     Active member of club or organization: Not on file     Attends meetings of clubs or organizations: Not on file      Relationship status: Not on file   Other Topics Concern    Not on file   Social History Narrative    Not on file     Review of Systems   Constitutional: Positive for fatigue and fever (subjective). Negative for chills.   HENT: Negative for ear discharge, sinus pressure and sinus pain.    Eyes: Negative for photophobia and visual disturbance.   Respiratory: Positive for cough. Negative for shortness of breath.    Cardiovascular: Negative for chest pain, palpitations and leg swelling.   Gastrointestinal: Positive for diarrhea. Negative for nausea and vomiting.   Endocrine: Negative for polyuria.   Genitourinary: Negative for decreased urine volume, difficulty urinating, dysuria, flank pain, frequency, hematuria, pelvic pain and urgency.        Malodorous urine   Musculoskeletal: Negative for neck pain and neck stiffness.   Neurological: Positive for weakness. Negative for dizziness, light-headedness and headaches.   Psychiatric/Behavioral: Negative for confusion and decreased concentration.     Objective:     Vital Signs (Most Recent):  Temp: 98.4 °F (36.9 °C) (04/12/19 0725)  Pulse: 95 (04/12/19 1054)  Resp: 16 (04/12/19 0725)  BP: 135/66 (04/12/19 0725)  SpO2: 95 % (04/12/19 0725) Vital Signs (24h Range):  Temp:  [97.8 °F (36.6 °C)-98.4 °F (36.9 °C)] 98.4 °F (36.9 °C)  Pulse:  [56-95] 95  Resp:  [16-20] 16  SpO2:  [95 %-98 %] 95 %  BP: (121-140)/(58-68) 135/66     Weight: 95.7 kg (211 lb)  Body mass index is 33.05 kg/m².    Estimated Creatinine Clearance: 58.4 mL/min (based on SCr of 1.2 mg/dL).    Physical Exam   Constitutional: She is oriented to person, place, and time. She appears well-developed and well-nourished. No distress.   HENT:   Head: Normocephalic and atraumatic.   Eyes: Conjunctivae are normal. No scleral icterus.   Neck: Neck supple. No JVD present. No tracheal deviation present.   Cardiovascular: Normal rate, regular rhythm, normal heart sounds and intact distal pulses. Exam reveals no gallop  and no friction rub.   Pulmonary/Chest: Effort normal and breath sounds normal. No stridor. No respiratory distress. She has no wheezes. She has no rales. She exhibits no tenderness.   Abdominal: Soft. Bowel sounds are normal. She exhibits no mass. There is tenderness (periumbilical mild TTP, no suprapubic tenderness). There is no rebound and no guarding.   Musculoskeletal: She exhibits no edema or tenderness.   Neurological: She is alert and oriented to person, place, and time.   Skin: Skin is warm and dry. She is not diaphoretic.   Psychiatric: She has a normal mood and affect. Her behavior is normal.   Vitals reviewed.      Significant Labs: All pertinent labs within the past 24 hours have been reviewed.    Significant Imaging: I have reviewed all pertinent imaging results/findings within the past 24 hours.

## 2019-04-12 NOTE — H&P
[facility]  Kidney Transplant  H&P      Subjective:     Chief Complaint/Reason for Admission: Fevers    History of Present Illness:  60 y/o F with PMHx of DM/HTN nephropathy ESRD s/p living kidney transplant 1/14/2019, recurrent UTIs (recently found to have pansensitive Klebsiella UTI 3/26/2019), and chronic lower back pain who presents to the ED with c/o fever. Pt reports that she has had fever (TMax 101.7 F) for the past 2 days with associated chills, diffuse headache, and cramping to suprapubic region, near surgical incision. She has been taking Tylenol for fever and pain reduction. Pt was admitted 4/2/19-4/5/19 for fevers and UTI.  She had cystourethroscopy done yesterday with Gynecology. She receives 1 g Invanz daily through PICC line for recurrent UTIs (completion date 4/15/19). She reports having mildly malodorous urine, but denies dysuria, hematuria, or dark colored urine. She has chronic mid thoracolumbar pain, for which she has had previous corrective surgeries. She also reports dyspnea on exertion (walking across the room) ever since her transplant, which has been gradually worsening. She has had a mild dry cough for the past few days. Denies chest pain, congestion, vision changes, flank pain, diarrhea, constipation, leg swelling, wound drainage, or any other medical complaints.         (Not in a hospital admission)    Review of patient's allergies indicates:   Allergen Reactions    Torsemide Diarrhea     Diarrhea stopped once discontinued med    Codeine Itching     Can take oxycodone and hydrocodone with Benadryl       Past Medical History:   Diagnosis Date    Anemia     Anemia in chronic kidney disease, on chronic dialysis 7/12/2017    Arthritis     Asthma     allergic airway    CKD stage III (moderate) 2/18/2019    Colon polyp     Depression     Diabetes mellitus     Diverticulosis     Eosinophilia     ESRD (end stage renal disease)     stage V, due for living donor  9/2018    ESRD on dialysis     GERD (gastroesophageal reflux disease)     Gout     Gout     Hyperlipidemia     Hypertension     Low back pain     MRSA carrier     Obesity     Renal disorder     Rotator cuff syndrome--left     Thyroid disease     Ulnar neuropathy of right upper extremity     Uncontrolled type 2 diabetes mellitus with hyperglycemia      Past Surgical History:   Procedure Laterality Date    ACHILLES TENDON SURGERY Left May 2015    ARTHROSCOPY, HIP Left 10/3/2013    Performed [facility]    ARTHROSCOPY-HIP WITH TROCHANTERIC BURSECTOMY Left 10/3/2013    Per[facility] at [facility]    ARTHROSCOPY-SHOULDER Left 11/24/2015    [facility] at [facility]    ARTHROSCOPY-SHOULDER WITH SUBACROMIAL DECOMPRESSION Left 7/12/2016    Performed [facility] at [facility]    BACK SURGERY      CHOLECYSTECTOMY      COLONOSCOPY N/A 9/6/2017    [facility]    DEBRIDEMENT-SHOULDER-ARTHROSCOPIC Left 7/12/2016    Per[facility] at [facility]    DEBRIDEMENT-SHOULDER-ARTHROSCOPIC; LABRAL Left 11/24/2015    Performed by [facility] at [facility]    DIALYSIS FISTULA CREATION  12/2017    FEMOROPLASTY, ARTHROSCOPIC Left 10/3/2013    Performed [facility] at [facility]    HEMORRHOID SURGERY      INJECTION-AMNIOX Left 7/12/2016    Performed [facility] at [facility]    JOINT REPLACEMENT      left    KNEE SURGERY      LYSIS-ADHESION Left 11/24/2015    [facility]formed [facility] at [facility]    REPAIR ROTATOR CUFF ARTHROSCOPIC Left 7/12/2016    [facility] at [facility]    REPAIR-TENDON-BICEP Left 11/24/2015    Per[facility] at [facility]    SHOULDER ARTHROSCOPY      SHOULDER SURGERY      TRANSPLANT, KIDNEY N/A 1/14/2019    [facility]    UPPER GASTROINTESTINAL ENDOSCOPY  ~2013    Dr. Kirt Negrete  "History     Problem Relation (Age of Onset)    Alzheimer's disease Mother    COPD Maternal Aunt    Diabetes Mother, Sister, Maternal Grandmother, Maternal Aunt    Heart disease Father, Brother, Maternal Aunt    Hyperlipidemia Mother    Hypertension Maternal Grandmother, Maternal Aunt    Lupus Sister    No Known Problems Son, Paternal Grandmother, Paternal Grandfather, Maternal Uncle, Paternal Aunt, Paternal Uncle    Ovarian cancer Sister    Stroke Father    Thyroid disease Mother        Tobacco Use    Smoking status: Never Smoker    Smokeless tobacco: Never Used   Substance and Sexual Activity    Alcohol use: No    Drug use: No    Sexual activity: Never        Review of Systems   Constitutional: Positive for activity change, chills, fatigue and fever.   HENT: Negative for congestion and sore throat.    Eyes: Negative for pain and redness.   Respiratory: Positive for cough. Negative for shortness of breath.    Cardiovascular: Negative for chest pain, palpitations and leg swelling.   Gastrointestinal: Positive for abdominal pain. Negative for abdominal distention, constipation, diarrhea, nausea and vomiting.   Genitourinary: Negative for flank pain and hematuria.   Musculoskeletal: Negative for back pain and gait problem.   Skin: Negative for rash and wound.   Neurological: Positive for headaches. Negative for light-headedness and numbness.   Hematological: Does not bruise/bleed easily.   Psychiatric/Behavioral: Negative for confusion. The patient is not nervous/anxious.      Objective:     Vital Signs (Most Recent):  Temp: 98.1 °F (36.7 °C) (04/11/19 1926)  Pulse: 64 (04/11/19 1926)  Resp: 16 (04/11/19 1926)  BP: 128/60 (04/11/19 1926)  SpO2: 98 % (04/11/19 1926)  Height: 5' 7" (170.2 cm)  Weight: 95.7 kg (211 lb)  Body mass index is 33.05 kg/m².     Physical Exam   Constitutional: She is oriented to person, place, and time. She appears well-developed and well-nourished.   HENT:   Head: Normocephalic and " atraumatic.   Eyes: Conjunctivae and EOM are normal.   Neck: Normal range of motion. Neck supple. No tracheal deviation present.   Cardiovascular: Normal rate and regular rhythm.   Pulmonary/Chest: Effort normal. No respiratory distress.   Abdominal: Soft. She exhibits no distension. There is tenderness (suprapubic).   Well healed right kidney transplant incision   Musculoskeletal: Normal range of motion. She exhibits no edema.   Neurological: She is alert and oriented to person, place, and time.   Skin: Skin is warm and dry. She is not diaphoretic.   Psychiatric: She has a normal mood and affect.   Vitals reviewed.      Laboratory  Labs within the past 24 hours have been reviewed.    Diagnostic Results:  Reviewed    Assessment/Plan:     S/P kidney transplant  60 y/o female s/p OLTx recently admitted for UTI presents to ED with fevers, fatigue and suprapubic pain.     Admit to kidney transplant  Diabetic diet  Continue home insulin regimen   Continue prescribed Ertapenem   ID consult  mIVF   F/u infectious workup        Bogdan Zepeda MD  Kidney Transplant  [facility]

## 2019-04-12 NOTE — HPI
Consult: Recurrent klebsiella UTI s/p KTx    59F PMHx HTN, HLD, DM2 c/b ESRD s/p KTx (Living donor D+/R- 1/14/2019), anemia (EPO injections). Presents to ED c/o subjective fevers, weakness, malaise, lack of energy, malodorous urine, 1 month of non-bloody diarrhea and a few days of dry cough. She has presented for multiple recurrent Klebsiella UTIs, most recently 4/2-4/5 where she was admitted and followed by ID, having received a 2 days of cefepime and then was transitioned to 14 day course ertapenem 1g q24hr via PICC which is scheduled to end 4/15. She also received a cystourethroscopy during previous admission that was unremarkable. UCx 2/27 positive for ESBL klebsiella, UCx 3/26 positive for pansensitive klebsiella, UCx 4/2 negative. 4/10 UA completely normal but high-risk UCx re-sent. BCx have been consistently negative 4/1 + 4/11. Influenza negative, stool studies negative, C.Diff negative.     On presentation pt is HDS, has been afebrile since admission 4/11 with last documented fever 4/1 TMax of 38.4. She is leukopenic to 2.5, LA 1.2, Procal 0.07, Hb 6.7. She is continuing on previously prescribed 14d course ertapenem scheduled thru 4/15. Currently on tacro 1mg bid in hospital (4/12 level 12.8) was on 1.5mg bid at home.

## 2019-04-12 NOTE — SUBJECTIVE & OBJECTIVE
Past Medical History:   Diagnosis Date    Anemia     Anemia in chronic kidney disease, on chronic dialysis 7/12/2017    Arthritis     Asthma     allergic airway    CKD stage III (moderate) 2/18/2019    Colon polyp     Depression     Diabetes mellitus     Diverticulosis     Eosinophilia     ESRD (end stage renal disease)     stage V, due for living donor 9/2018    ESRD on dialysis     GERD (gastroesophageal reflux disease)     Gout     Gout     Hyperlipidemia     Hypertension     Low back pain     MRSA carrier     Obesity     Renal disorder     Rotator cuff syndrome--left     Thyroid disease     Ulnar neuropathy of right upper extremity     Uncontrolled type 2 diabetes mellitus with hyperglycemia        Past Surgical History:   Procedure Laterality Date    ACHILLES TENDON SURGERY Left May 2015    ARTHROSCOPY, HIP Left 10/3/2013    [facility]    ARTHROSCOPY-HIP WITH TROCHANTERIC BURSECTOMY Left 10/3/2013    [facility] at [facility]    ARTHROSCOPY-SHOULDER Left 11/24/2015    [facility] at [facility]    ARTHROSCOPY-SHOULDER WITH SUBACROMIAL DECOMPRESSION Left 7/12/2016    [facility] at [facility]    BACK SURGERY      CHOLECYSTECTOMY      COLONOSCOPY N/A 9/6/2017    [facility]    DEBRIDEMENT-SHOULDER-ARTHROSCOPIC Left 7/12/2016    [facility] at [facility]    DEBRIDEMENT-SHOULDER-ARTHROSCOPIC; LABRAL Left 11/24/2015    [facility] at [facility]    DIALYSIS FISTULA CREATION  12/2017    FEMOROPLASTY, ARTHROSCOPIC Left 10/3/2013    [facility] at [facility]    HEMORRHOID SURGERY      INJECTION-AMNIOX Left 7/12/2016    [facility] at [facility]    JOINT REPLACEMENT      left    KNEE SURGERY      LYSIS-ADHESION Left 11/24/2015    [facility] at [facility]    REPAIR ROTATOR CUFF  ARTHROSCOPIC Left 7/12/2016    [facility]    REPAIR-TENDON-BICEP Left 11/24/2015    [facility]    SHOULDER ARTHROSCOPY      SHOULDER SURGERY      TRANSPLANT, KIDNEY N/A 1/14/2019    Performed by [facility] at [facility] OR [address] [place] [address]    UPPER GASTROINTESTINAL ENDOSCOPY  ~2013     Kirt       Review of patient's allergies indicates:   Allergen Reactions    Torsemide Diarrhea     Diarrhea stopped once discontinued med    Codeine Itching     Can take oxycodone and hydrocodone with Benadryl       Medications:  Medications Prior to Admission   Medication Sig    ergocalciferol (ERGOCALCIFEROL) 50,000 unit Cap Take 1 capsule (50,000 Units total) by mouth every 7 days. Take on Tues    ertapenem sodium (ERTAPENEM, INVANZ, 1 G/100 ML NS, READY TO MIX,) Inject 100 mLs (1 g total) into the vein once daily. Stop: 4/15/19    estradiol (ESTRACE) 0.01 % (0.1 mg/gram) vaginal cream Place 0.5 g vaginally twice a week. Apply to the vagina daily for two weeks then twice a week.    famotidine (PEPCID) 20 MG tablet Take 1 tablet (20 mg total) by mouth 2 (two) times daily.    flash glucose scanning reader (FREESTYLE DAKOTAH 14 DAY READER) Misc Dispense 1 reader    flash glucose sensor (FREESTYLE DAKOTAH 14 DAY SENSOR) Kit Uses 1 kit monthly    insulin (BASAGLAR KWIKPEN U-100 INSULIN) glargine 100 units/mL (3mL) SubQ pen Inject 30 Units into the skin every evening.    insulin aspart U-100 (NOVOLOG FLEXPEN U-100 INSULIN) 100 unit/mL InPn pen Novolog 10 u AC + correction Max TDD 40u    INV acyclovir/placebo capsule Take 1 capsule (400 mg total) by mouth every 12 (twelve) hours.  For investigational use only. 0238-23323. Protocol: MK 8228-002    INV MK-8228/placebo 480 mg oral tablet Take 1 tablet (480 mg total) by mouth once daily. FOR INVESTIGATIONAL USE ONLY. Patient Study# [id number]. Protocol: MK 8228-002    INV valganciclovir/placebo tablet Take 2 tablets  "(900 mg total) by mouth once daily.  FOR INVESTIGATIONAL USE ONLY. [id number]. Protocol: MK 8228-002    levothyroxine (SYNTHROID) 75 MCG tablet TAKE ONE TABLET BY MOUTH ONCE DAILY    LORazepam (ATIVAN) 2 MG Tab Take 1 tablet (2 mg total) by mouth 2 (two) times daily. (Patient taking differently: Take 1 mg by mouth 2 (two) times daily. )    magnesium oxide (MAG-OX) 400 mg (241.3 mg magnesium) tablet Take 2 tablets (800 mg total) by mouth once daily.    multivitamin (THERAGRAN) tablet Take 1 tablet by mouth once daily.    oxybutynin (DITROPAN-XL) 10 MG 24 hr tablet Take 1 tablet (10 mg total) by mouth once daily.    pen needle, diabetic (BD ULTRA-FINE MINI PEN NEEDLE) 31 gauge x 3/16" Ndle USES 4  DAILY    rosuvastatin (CRESTOR) 10 MG tablet Take 1 tablet (10 mg total) by mouth once daily.    tacrolimus (PROGRAF) 0.5 MG Cap Take 3 capsules (1.5 mg total) by mouth every morning AND 3 capsules (1.5 mg total) every evening.    VIIBRYD 40 mg Tab tablet TAKE ONE TABLET BY MOUTH ONCE DAILY     Antibiotics (From admission, onward)    Start     Stop Route Frequency Ordered    04/11/19 2100  ertapenem (INVANZ) 1 g in sodium chloride 0.9 % 100 mL IVPB (ready to mix system)      -- IV Every 24 hours (non-standard times) 04/11/19 2007        Antifungals (From admission, onward)    None        Antivirals (From admission, onward)        Stop Route Frequency     INV acyclovir/placebo      -- Oral Every 12 hours     INV valganciclovir/placebo      -- Oral Daily           Immunization History   Administered Date(s) Administered    Hepatitis A / Hepatitis B 07/12/2017, 08/14/2017, 01/08/2018    Influenza 03/15/2017    PPD Test 04/13/2018    Pneumococcal Conjugate - 13 Valent 03/15/2017    Tdap 07/12/2017       Family History     Problem Relation (Age of Onset)    Alzheimer's disease Mother    COPD Maternal Aunt    Diabetes Mother, Sister, Maternal Grandmother, Maternal Aunt    Heart disease Father, Brother, Maternal Aunt "    Hyperlipidemia Mother    Hypertension Maternal Grandmother, Maternal Aunt    Lupus Sister    No Known Problems Son, Paternal Grandmother, Paternal Grandfather, Maternal Uncle, Paternal Aunt, Paternal Uncle    Ovarian cancer Sister    Stroke Father    Thyroid disease Mother        Social History     Socioeconomic History    Marital status: Significant Other     Spouse name: Not on file    Number of children: 2    Years of education: Not on file    Highest education level: Not on file   Occupational History    Occupation: retired     Comment:    Social Needs    Financial resource strain: Not on file    Food insecurity:     Worry: Not on file     Inability: Not on file    Transportation needs:     Medical: Not on file     Non-medical: Not on file   Tobacco Use    Smoking status: Never Smoker    Smokeless tobacco: Never Used   Substance and Sexual Activity    Alcohol use: No    Drug use: No    Sexual activity: Never   Lifestyle    Physical activity:     Days per week: Not on file     Minutes per session: Not on file    Stress: Not on file   Relationships    Social connections:     Talks on phone: Not on file     Gets together: Not on file     Attends [employer] service: Not on file     Active member of club or organization: Not on file     Attends meetings of clubs or organizations: Not on file     Relationship status: Not on file   Other Topics Concern    Not on file   Social History Narrative    Not on file     Review of Systems   Constitutional: Positive for fatigue and fever (subjective). Negative for chills.   HENT: Negative for ear discharge, sinus pressure and sinus pain.    Eyes: Negative for photophobia and visual disturbance.   Respiratory: Positive for cough. Negative for shortness of breath.    Cardiovascular: Negative for chest pain, palpitations and leg swelling.   Gastrointestinal: Positive for diarrhea. Negative for nausea and vomiting.   Endocrine: Negative for  polyuria.   Genitourinary: Negative for decreased urine volume, difficulty urinating, dysuria, flank pain, frequency, hematuria, pelvic pain and urgency.        Malodorous urine   Musculoskeletal: Negative for neck pain and neck stiffness.   Neurological: Positive for weakness. Negative for dizziness, light-headedness and headaches.   Psychiatric/Behavioral: Negative for confusion and decreased concentration.     Objective:     Vital Signs (Most Recent):  Temp: 98.4 °F (36.9 °C) (04/12/19 0725)  Pulse: 95 (04/12/19 1054)  Resp: 16 (04/12/19 0725)  BP: 135/66 (04/12/19 0725)  SpO2: 95 % (04/12/19 0725) Vital Signs (24h Range):  Temp:  [97.8 °F (36.6 °C)-98.4 °F (36.9 °C)] 98.4 °F (36.9 °C)  Pulse:  [56-95] 95  Resp:  [16-20] 16  SpO2:  [95 %-98 %] 95 %  BP: (121-140)/(58-68) 135/66     Weight: 95.7 kg (211 lb)  Body mass index is 33.05 kg/m².    Estimated Creatinine Clearance: 58.4 mL/min (based on SCr of 1.2 mg/dL).    Physical Exam   Constitutional: She is oriented to person, place, and time. She appears well-developed and well-nourished. No distress.   HENT:   Head: Normocephalic and atraumatic.   Eyes: Conjunctivae are normal. No scleral icterus.   Neck: Neck supple. No JVD present. No tracheal deviation present.   Cardiovascular: Normal rate, regular rhythm, normal heart sounds and intact distal pulses. Exam reveals no gallop and no friction rub.   Pulmonary/Chest: Effort normal and breath sounds normal. No stridor. No respiratory distress. She has no wheezes. She has no rales. She exhibits no tenderness.   Abdominal: Soft. Bowel sounds are normal. She exhibits no mass. There is tenderness (periumbilical mild TTP, no suprapubic tenderness). There is no rebound and no guarding.   Musculoskeletal: She exhibits no edema or tenderness.   Neurological: She is alert and oriented to person, place, and time.   Skin: Skin is warm and dry. She is not diaphoretic.   Psychiatric: She has a normal mood and affect. Her  behavior is normal.   Vitals reviewed.      Significant Labs: All pertinent labs within the past 24 hours have been reviewed.    Significant Imaging: I have reviewed all pertinent imaging results/findings within the past 24 hours.

## 2019-04-12 NOTE — HOSPITAL COURSE
60 y/o F s/p DBD KTx for ESRD 2/2 DM and HTN on 1/14/19 admitted for fever on 4/11. Pt afebrile, VSS, no leukocytosis, Cr at baseline. Pt with 101.7 fever while on invanz for pansensitive klebsiella UTI noted on urine culture on 3/26. Of note, pt had urethrocystoscopy for recurrent UTIs on 4/11 which was normal. UA clean; urine culture and blood culture pending. Pt continued on invanz and started on IVF. This AM pt reports continued suprapubic pain and mild dysuria. Labs reveal mild hypernatremia. IVF stopped and pt given 1/2 NS bolus. ID consulted for recurrent UTIs. Kidney US pending. Continue invanz for now. CMV PCR in AM. Monitor.

## 2019-04-12 NOTE — SUBJECTIVE & OBJECTIVE
Subjective:   History of Present Illness:  60 y/o F with PMHx of DM/HTN nephropathy ESRD s/p living kidney transplant 1/14/2019, recurrent ESBL klebsiella UTIs (recently found to have pansensitive Klebsiella UTI 3/26/2019), and chronic lower back pain who presents to the ED with c/o fever. Pt reports that she has had fever (TMax 101.7 F) for the past 2 days with associated chills, diffuse headache, and cramping to suprapubic region, near surgical incision. She has been taking Tylenol for fever and pain reduction. Pt was admitted 4/2/19-4/5/19 for fevers and UTI.  She had cystourethroscopy done yesterday with Gynecology. She receives 1 g Invanz daily through PICC line for recurrent UTIs (completion date 4/15/19). She reports having mildly malodorous urine, but denies dysuria, hematuria, or dark colored urine. She has chronic mid thoracolumbar pain, for which she has had previous corrective surgeries. She also reports dyspnea on exertion (walking across the room) ever since her transplant, which has been gradually worsening. She has had a mild dry cough for the past few days. Denies chest pain, congestion, vision changes, flank pain, diarrhea, constipation, leg swelling, wound drainage, or any other medical complaints.         Ms. Newell is a 59 y.o. year old female who is status post Kidney Transplant - 1/14/2019  (#1).  Her maintenance immunosuppression consists of:   Immunosuppressants (From admission, onward)    Start     Stop Route Frequency Ordered    04/12/19 1800  tacrolimus capsule 1 mg      -- Oral 2 times daily 04/12/19 0946          Hospital Course:  60 y/o F s/p DBD KTx for ESRD 2/2 DM and HTN on 1/14/19 admitted for fever on 4/11. Pt afebrile, VSS, no leukocytosis, Cr at baseline. Pt with 101.7 fever while on invanz for pansensitive klebsiella UTI noted on urine culture on 3/26. Of note, pt had urethrocystoscopy for recurrent UTIs on 4/11 which was normal. Pt denies history of UTIs prior to KTx. UA  on admission was clean; urine culture and blood culture pending. Pt continued on invanz and started on IVF. This AM pt reports continued suprapubic pain and mild dysuria. Labs reveal mild hypernatremia. IVF stopped and pt given 1/2 NS bolus. ID consulted for recurrent UTIs. Kidney US pending. Continue invanz for now. CMV PCR in AM. Monitor.      Past Medical, Surgical, Family, and Social History:   Unchanged from H&P.    Scheduled Meds:   ertapenem (INVANZ) 1 g in sodium chloride 0.9 % 100 mL IVPB (ready to mix system)  1 g Intravenous Q24H    famotidine  20 mg Oral BID    insulin aspart U-100  10 Units Subcutaneous TIDWM    insulin detemir U-100  30 Units Subcutaneous QHS    INV acyclovir/placebo  400 mg Oral Q12H    INV MK-8228/placebo  480 mg Oral Daily    INV valganciclovir/placebo  900 mg Oral Daily    levothyroxine  75 mcg Oral Before breakfast    LORazepam  1 mg Oral BID    magnesium oxide  800 mg Oral Daily    oxybutynin  10 mg Oral Daily    tacrolimus  1 mg Oral BID    vilazodone  40 mg Oral Daily     Continuous Infusions:   sodium chloride 0.45% 100 mL/hr at 04/12/19 1230     PRN Meds:    Intake/Output - Last 3 Shifts       04/10 0700 - 04/11 0659 04/11 0700 - 04/12 0659 04/12 0700 - 04/13 0659    Urine (mL/kg/hr)  600 150 (0.2)    Stool  0     Total Output  600 150    Net  -600 -150           Stool Occurrence  0 x            Review of Systems   Constitutional: Positive for activity change, chills, fatigue and fever ( now resolved).   HENT: Negative for congestion and sore throat.    Eyes: Negative for pain and redness.   Respiratory: Negative for cough and shortness of breath.    Cardiovascular: Negative for chest pain, palpitations and leg swelling.   Gastrointestinal: Positive for abdominal pain. Negative for abdominal distention, constipation, diarrhea, nausea and vomiting.   Genitourinary: Negative for flank pain and hematuria.   Musculoskeletal: Negative for back pain and gait problem.  "  Skin: Negative for rash and wound.   Neurological: Negative for light-headedness, numbness and headaches.   Hematological: Does not bruise/bleed easily.   Psychiatric/Behavioral: Negative for confusion. The patient is not nervous/anxious.       Objective:     Vital Signs (Most Recent):  Temp: 98.4 °F (36.9 °C) (04/12/19 0725)  Pulse: 95 (04/12/19 1054)  Resp: 16 (04/12/19 0725)  BP: 135/66 (04/12/19 0725)  SpO2: 95 % (04/12/19 0725) Vital Signs (24h Range):  Temp:  [97.8 °F (36.6 °C)-98.4 °F (36.9 °C)] 98.4 °F (36.9 °C)  Pulse:  [56-95] 95  Resp:  [16-20] 16  SpO2:  [95 %-98 %] 95 %  BP: (121-140)/(58-68) 135/66     Weight: 95.7 kg (211 lb)  Height: 5' 7" (170.2 cm)  Body mass index is 33.05 kg/m².    Physical Exam   Constitutional: She is oriented to person, place, and time. She appears well-developed and well-nourished.   HENT:   Head: Normocephalic and atraumatic.   Eyes: Conjunctivae and EOM are normal.   Neck: Normal range of motion. Neck supple. No tracheal deviation present.   Cardiovascular: Normal rate and regular rhythm.   Pulmonary/Chest: Effort normal. No respiratory distress.   Abdominal: Soft. She exhibits no distension. There is tenderness (suprapubic).   Well healed right kidney transplant incision   Musculoskeletal: Normal range of motion. She exhibits no edema.   Neurological: She is alert and oriented to person, place, and time.   Skin: Skin is warm and dry. She is not diaphoretic.   Psychiatric: She has a normal mood and affect.   Vitals reviewed.      Laboratory:  CBC:   Recent Labs   Lab 04/09/19  1044 04/11/19  1800 04/12/19  0400   WBC 5.65 2.68* 2.46*   RBC 2.63* 2.27* 2.20*   HGB 8.1* 7.0* 6.7*   HCT 24.5* 21.2* 20.7*    153 157   MCV 93 93 94   MCH 30.8 30.8 30.5   MCHC 33.1 33.0 32.4     CMP:   Recent Labs   Lab 04/09/19  1044 04/11/19  1800 04/12/19  0400   * 209* 100   CALCIUM 9.2 8.9 8.3*   ALBUMIN 3.6 3.2* 2.9*   PROT  --  6.2  --     143 145   K 4.2 4.4 4.0 "   CO2 23 24 22*   * 111* 115*   BUN 14 19 15   CREATININE 1.1 1.6* 1.2   ALKPHOS  --  158*  --    ALT  --  31  --    AST  --  34  --      Labs within the past 24 hours have been reviewed.    Diagnostic Results:  US - Kidney:   Results for orders placed during the hospital encounter of 04/02/19   US Transplant Kidney With Doppler    Narrative EXAMINATION:  US TRANSPLANT KIDNEY WITH DOPPLER    CLINICAL HISTORY:  s/p KTx, assess for fluid collections;    TECHNIQUE:  Transplant renal ultrasound of the right lower quadrant with color flow doppler and duplex analysis.    COMPARISON:  Ultrasound transplant kidney with Doppler 02/11/2018    FINDINGS:  Renal allograft present in the right lower quadrant, measuring 13.0 centimeters in length.  There is no hydronephrosis or focal abnormality.  There is a collection inferior to the surgical incision measuring 14.0 x 1.2 x 3.1 cm, mildly decreased in size from prior measurement of 12.7 x 1.9 x 6.6 cm.    Resistive indices are improved ranging from 0.75 to 0.79 (previously ranging from 0.80 to 0.82). Velocity in main renal artery is 171 cm/sec, previously 125 cm/sec, and the renal artery/iliac ratio is 0.88.  The main renal vein is patent.    The bladder is partially distended at the time of scanning and unremarkable.      Impression Satisfactory grayscale and Doppler evaluation of the renal allograft demonstrating improvement in the renal arterial resistive indices compared to the prior study of 02/11/2019.    Persistent fluid collection about the surgical incision, as above.    Electronically signed by resident: Derrick Wyatt MD  Date:    04/02/2019  Time:    13:51    Electronically signed by: Ajay Neri MD  Date:    04/02/2019  Time:    14:25

## 2019-04-13 VITALS
OXYGEN SATURATION: 95 % | WEIGHT: 211 LBS | DIASTOLIC BLOOD PRESSURE: 78 MMHG | TEMPERATURE: 98 F | SYSTOLIC BLOOD PRESSURE: 177 MMHG | HEART RATE: 71 BPM | HEIGHT: 67 IN | BODY MASS INDEX: 33.12 KG/M2 | RESPIRATION RATE: 16 BRPM

## 2019-04-13 LAB
ALBUMIN SERPL BCP-MCNC: 3 G/DL (ref 3.5–5.2)
ANION GAP SERPL CALC-SCNC: 8 MMOL/L (ref 8–16)
BASOPHILS # BLD AUTO: 0 K/UL (ref 0–0.2)
BASOPHILS NFR BLD: 0 % (ref 0–1.9)
BUN SERPL-MCNC: 12 MG/DL (ref 8–23)
CALCIUM SERPL-MCNC: 8.5 MG/DL (ref 8.7–10.5)
CHLORIDE SERPL-SCNC: 115 MMOL/L (ref 95–110)
CO2 SERPL-SCNC: 22 MMOL/L (ref 23–29)
CREAT SERPL-MCNC: 1 MG/DL (ref 0.5–1.4)
DIFFERENTIAL METHOD: ABNORMAL
EOSINOPHIL # BLD AUTO: 0.1 K/UL (ref 0–0.5)
EOSINOPHIL NFR BLD: 4.6 % (ref 0–8)
ERYTHROCYTE [DISTWIDTH] IN BLOOD BY AUTOMATED COUNT: 17.2 % (ref 11.5–14.5)
EST. GFR  (AFRICAN AMERICAN): >60 ML/MIN/1.73 M^2
EST. GFR  (NON AFRICAN AMERICAN): >60 ML/MIN/1.73 M^2
GLUCOSE SERPL-MCNC: 76 MG/DL (ref 70–110)
HCT VFR BLD AUTO: 25.3 % (ref 37–48.5)
HGB BLD-MCNC: 8.1 G/DL (ref 12–16)
IMM GRANULOCYTES # BLD AUTO: 0.05 K/UL (ref 0–0.04)
IMM GRANULOCYTES NFR BLD AUTO: 2.3 % (ref 0–0.5)
LYMPHOCYTES # BLD AUTO: 0.2 K/UL (ref 1–4.8)
LYMPHOCYTES NFR BLD: 10.6 % (ref 18–48)
MCH RBC QN AUTO: 29.9 PG (ref 27–31)
MCHC RBC AUTO-ENTMCNC: 32 G/DL (ref 32–36)
MCV RBC AUTO: 93 FL (ref 82–98)
MONOCYTES # BLD AUTO: 0.2 K/UL (ref 0.3–1)
MONOCYTES NFR BLD: 9.7 % (ref 4–15)
NEUTROPHILS # BLD AUTO: 1.6 K/UL (ref 1.8–7.7)
NEUTROPHILS NFR BLD: 72.8 % (ref 38–73)
NRBC BLD-RTO: 0 /100 WBC
PHOSPHATE SERPL-MCNC: 4.1 MG/DL (ref 2.7–4.5)
PLATELET # BLD AUTO: 149 K/UL (ref 150–350)
PMV BLD AUTO: 10.3 FL (ref 9.2–12.9)
POCT GLUCOSE: 115 MG/DL (ref 70–110)
POCT GLUCOSE: 98 MG/DL (ref 70–110)
POTASSIUM SERPL-SCNC: 4.1 MMOL/L (ref 3.5–5.1)
RBC # BLD AUTO: 2.71 M/UL (ref 4–5.4)
SODIUM SERPL-SCNC: 145 MMOL/L (ref 136–145)
TACROLIMUS BLD-MCNC: 11.9 NG/ML (ref 5–15)
WBC # BLD AUTO: 2.17 K/UL (ref 3.9–12.7)

## 2019-04-13 PROCEDURE — 80197 ASSAY OF TACROLIMUS: CPT

## 2019-04-13 PROCEDURE — 80069 RENAL FUNCTION PANEL: CPT

## 2019-04-13 PROCEDURE — 85025 COMPLETE CBC W/AUTO DIFF WBC: CPT

## 2019-04-13 PROCEDURE — 63600175 PHARM REV CODE 636 W HCPCS: Performed by: PHYSICIAN ASSISTANT

## 2019-04-13 PROCEDURE — 99239 HOSP IP/OBS DSCHRG MGMT >30: CPT | Mod: 24,,, | Performed by: NURSE PRACTITIONER

## 2019-04-13 PROCEDURE — 25000003 PHARM REV CODE 250: Performed by: STUDENT IN AN ORGANIZED HEALTH CARE EDUCATION/TRAINING PROGRAM

## 2019-04-13 PROCEDURE — 99239 PR HOSPITAL DISCHARGE DAY,>30 MIN: ICD-10-PCS | Mod: 24,,, | Performed by: NURSE PRACTITIONER

## 2019-04-13 RX ORDER — TACROLIMUS 0.5 MG/1
1 CAPSULE ORAL EVERY 12 HOURS
Qty: 120 CAPSULE | Refills: 11 | Status: SHIPPED | OUTPATIENT
Start: 2019-04-13 | End: 2019-05-21 | Stop reason: DRUGHIGH

## 2019-04-13 RX ADMIN — LORAZEPAM 1 MG: 1 TABLET ORAL at 08:04

## 2019-04-13 RX ADMIN — FAMOTIDINE 20 MG: 20 TABLET, FILM COATED ORAL at 08:04

## 2019-04-13 RX ADMIN — OXYBUTYNIN CHLORIDE 10 MG: 10 TABLET, FILM COATED, EXTENDED RELEASE ORAL at 08:04

## 2019-04-13 RX ADMIN — VILAZODONE HYDROCHLORIDE 40 MG: 10 TABLET ORAL at 09:04

## 2019-04-13 RX ADMIN — MAGNESIUM OXIDE TAB 400 MG (241.3 MG ELEMENTAL MG) 800 MG: 400 (241.3 MG) TAB at 08:04

## 2019-04-13 RX ADMIN — LEVOTHYROXINE SODIUM 75 MCG: 75 TABLET ORAL at 05:04

## 2019-04-13 RX ADMIN — TACROLIMUS 1 MG: 1 CAPSULE ORAL at 08:04

## 2019-04-13 NOTE — PROGRESS NOTES
SW attempted to meet with pt twice to complete an admit note. The following information was obtained from a previous admit note:    Admit Note     Met with patient alone to assess needs. Pt'a wife is currently at work. Pt reports she was here all night with her and had to go to work. Patient is a 59 y.o. is in committed relationship with Any callejas, admitted for post kidney transplantation on 1/14/19 .    Patient admitted from home on 4/11/2019 .  At this time, patient presents as alert and oriented x 4, pleasant, good eye contact, well groomed, recall good, concentration/judgement good, average intelligence, calm, communicative, cooperative and asking and answering questions appropriately.  At this time, patients caregiver is currently at work and will return tonight.     Household/Family Systems     Patient resides with patient's significant other, Any at [address] [place] [address].  Support system includes s/o, two sons and friend Ashleigh.  Patient does not have dependents that are need of being cared for.     Patients primary caregiver is Any , servando significant other, phone number [phone].  Confirmed patients contact information is [phone] (home);   Telephone Information:   Mobile [phone]   .    During admission, patient's caregiver plans to stay in patient's room.  Confirmed patient and patients caregivers do have access to reliable transportation.    Cognitive Status/Learning     Patient reports reading ability as 12th grade and states patient does have difficulty with seeing and utilizes glasses .  Patient reports patient learns best by hands-on instruction .   Needed: No.   Highest education level: High School (9-12) or GED    Vocation/Disability   .  Working for Income: No  If no, reason not working: Disability  Patient is disabled due to work injury  since 2015.  Prior to disability, patient  was employed as a Rec Supervisor at [employer]  .    Adherence     Patient reports a high level of adherence to patients health care regimen.  Adherence counseling and education provided. Patient verbalizes understanding.    Substance Use    Patient reports the following substance usage.    Tobacco: none, patient denies any use.  Alcohol: none, patient denies any use.  Illicit Drugs/Non-prescribed Medications: none, patient denies any use.  Patient states clear understanding of the potential impact of substance use.  Substance abstinence/cessation counseling, education and resources provided and reviewed.     Services Utilizing/ADLS    Infusion Service: Prior to admission, patient utilizing? no  Home Health: Prior to admission, patient utilizing? no  DME: Prior to admission, yes scooter for long distances  Pulmonary/Cardiac Rehab: Prior to admission, no  Dialysis:  Prior to admission, yes MWF  Transplant Specialty Pharmacy:  Prior to admission, yes; [facility] .    Prior to admission, patient reports patient was independent with ADLS and was driving.  Patient reports patient is not able to care for self at this time due to compromised medical condition (as documented in medical record) and physical weakness..  Patient indicates a willingness to care for self once medically cleared to do so.    Insurance/Medications    Insured by   Payor/Plan Subscr  Sex Relation Sub. Ins. ID Effective Group Num   1. [employer] * KVNG NUNEZ 1902 Female  [id number]                                    [id number] [facility] [id number]   2. [place]* VIRGIE DUKE * 1960 Female  [id number]                                    [address]      Primary Insurance (for UNOS reporting): Public Insurance - Medicare FFS (Fee For Service)  Secondary Insurance (for UNOS reporting): Private Insurance    Patient reports patient is able to obtain and afford medications at this time and at time of discharge.    Living Will/Healthcare  "Power of     Patient states patient does not have a LW and/or HCPA.   provided education regarding LW and HCPA and the completion of forms.    Coping/Mental Health    Patient is coping adequately with the aid of  family members and friends.  Patient denies mental health difficulties at this present time. Pt does have a history of SI after work injury in 2015 and was hospitalized. Pt is currently followed by Dr. Sheth for depression. Pt utilizes Ativan and Vilbryd daily and reports as stable. Today, pt reports she is mostly anxious about her recurrent UTIs. She states "I think this is my third admit for them and to be honest, it knocks me out. I am so tired and feel horrible. I just hope this stops happening because it can be difficult to process sometimes". SW provided reflective listening and encouraged pt to keep expressing herself. SW validated pt feelings and encouraged her to keep listening to the medical team. She states "I feel like I am doing everything right and more and I keep getting sick". SW again encouraged pt to keep doing what she is doing and to listen to the MDs. Pt agreed and confirmed understanding.     Discharge Planning    At time of discharge, patient plans to return to patient's home under the care of Any.  Patients significant other will transport patient.  Per rounds today, expected discharge date has not been medically determined at this time. Patient and patients caregiver  verbalize understanding and are involved in treatment planning and discharge process.    Additional Concerns    Patient's caretaker denies additional needs and/or concerns at this time.  providing ongoing psychosocial support, education, resources and d/c planning as needed.  SW remains available. Patient's caregiver verbalizes understanding and agreement with information reviewed,  availability and how to access available resources as needed. Patient denies " additional needs and/or concerns at this time. Patient verbalizes understanding and agreement with information reviewed, social work availability, and how to access available resources as needed.

## 2019-04-13 NOTE — DISCHARGE SUMMARY
[facility]  Kidney Transplant  Discharge Summary    Patient Name: Andra Newell  MRN: [id number]  Admission Date: 4/11/2019  Hospital Length of Stay: 2 days  Discharge Date and Time:  04/13/2019 10:26 AM  Attending Physician: Yonny Fuentes MD   Discharging Provider: Shante Green NP  Primary Care Provider: Gita Cohen MD    HPI:   60 y/o F with PMHx of DM/HTN nephropathy ESRD s/p living kidney transplant 1/14/2019, recurrent ESBL klebsiella UTIs (recently found to have pansensitive Klebsiella UTI 3/26/2019), and chronic lower back pain who presents to the ED with c/o fever. Pt reports that she has had fever (TMax 101.7 F) for the past 2 days with associated chills, diffuse headache, and cramping to suprapubic region, near surgical incision. She has been taking Tylenol for fever and pain reduction. Pt was admitted 4/2/19-4/5/19 for fevers and UTI.  She had cystourethroscopy done yesterday with Gynecology. She receives 1 g Invanz daily through PICC line for recurrent UTIs (completion date 4/15/19). She reports having mildly malodorous urine, but denies dysuria, hematuria, or dark colored urine. She has chronic mid thoracolumbar pain, for which she has had previous corrective surgeries. She also reports dyspnea on exertion (walking across the room) ever since her transplant, which has been gradually worsening. She has had a mild dry cough for the past few days. Denies chest pain, congestion, vision changes, flank pain, diarrhea, constipation, leg swelling, wound drainage, or any other medical complaints.         * No surgery found *     Hospital Course:    60 y/o F s/p DBD KTx for ESRD 2/2 DM and HTN on 1/14/19 admitted for fever on 4/11. Pt afebrile, VSS, no leukocytosis, Cr at baseline. Pt with 101.7 fever while on invanz for pansensitive klebsiella UTI noted on urine culture on 3/26. Of note, pt had urethrocystoscopy for recurrent UTIs on 4/11 which was normal. UA clean; urine culture  and blood culture ngtd. ID consulted for recommendations.  Will continue ertapenem as previously prescribed ending 4/15.  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      Patient stable and ready for discharge.  Will have labs and follow up appointment with nephrology 4/22.  Follow up appointment 4/15 with infectious disease.     Final Active Diagnoses:    Diagnosis Date Noted POA    PRINCIPAL PROBLEM:  Fever, unspecified [R50.9] 04/12/2019 No    History of recurrent urinary tract infection [Z87.440] 04/12/2019 Yes    S/P kidney transplant [Z94.0] 04/11/2019 Not Applicable    Long-term use of immunosuppressant medication [Z79.899] 01/15/2019 Not Applicable    At risk for opportunistic infections [Z91.89] 01/15/2019 Yes    Prophylactic immunotherapy [Z29.8] 09/06/2017 Not Applicable      Problems Resolved During this Admission:    Diagnosis Date Noted Date Resolved POA    Fever [R50.9] 04/02/2019 04/05/2019 Yes       Treatments: IV hydration    Consults (From admission, onward)        Status Ordering Provider     Inpatient consult to Infectious Diseases  Once     Provider:  (Not yet assigned)    Completed RASHMI NUNEZ     Inpatient consult to Kidney Transplant Surgery  Once     Provider:  (Not yet assigned)    Completed YESICA WALSH          Pending Diagnostic Studies:     Procedure Component Value Units Date/Time    CBC auto differential [[id number]]     Order Status:  Sent Lab Status:  No result     Specimen:  Blood     CMV DNA, quantitative, PCR [[id number]] Collected:  04/13/19 0522    Order Status:  Sent Lab Status:  In process Updated:  04/13/19 0522    Specimen:  Blood         Significant Diagnostic Studies: Labs:   CMP   Recent Labs   Lab 04/11/19  1800 04/12/19  0400 04/13/19  0523    145 145   K 4.4 4.0 4.1   * 115* 115*   CO2 24 22* 22*   * 100 76   BUN 19 15 12   CREATININE 1.6* 1.2 1.0   CALCIUM 8.9 8.3* 8.5*   PROT 6.2  --   --    ALBUMIN 3.2* 2.9* 3.0*    BILITOT 0.5  --   --    ALKPHOS 158*  --   --    AST 34  --   --    ALT 31  --   --    ANIONGAP 8 8 8   ESTGFRAFRICA 39.8* 56.4* >60.0   EGFRNONAA 34.5* 48.9* >60.0   , CBC   Recent Labs   Lab 04/11/19  1800 04/12/19  0400 04/13/19  0523   WBC 2.68* 2.46* 2.17*   HGB 7.0* 6.7* 8.1*   HCT 21.2* 20.7* 25.3*    157 149*    and INR   Lab Results   Component Value Date    INR 1.0 01/14/2019    INR 1.0 01/03/2019    INR 1.0 10/09/2018       Discharged Condition: good    Disposition: Home or Self Care    Follow Up:    Patient Instructions:      Referral to Home health   Referral Priority: Routine Referral Type: Home Health Care   Referral Reason: Specialty Services Required   Requested Specialty: Home Health Services   Number of Visits Requested: 1     Notify your health care provider if you experience any of the following:  temperature >100.4     Notify your health care provider if you experience any of the following:  persistent nausea and vomiting or diarrhea     Notify your health care provider if you experience any of the following:  severe uncontrolled pain     Notify your health care provider if you experience any of the following:  difficulty breathing or increased cough     Notify your health care provider if you experience any of the following:  severe persistent headache     Notify your health care provider if you experience any of the following:  worsening rash     Notify your health care provider if you experience any of the following:  persistent dizziness, light-headedness, or visual disturbances     Notify your health care provider if you experience any of the following:  increased confusion or weakness     No dressing needed     Activity as tolerated     Medications:  Reconciled Home Medications:      Medication List      CHANGE how you take these medications    LORazepam 2 MG Tab  Commonly known as:  ATIVAN  Take 1 tablet (2 mg total) by mouth 2 (two) times daily.  What changed:  how much to  take     tacrolimus 0.5 MG Cap  Commonly known as:  PROGRAF  Take 2 capsules (1 mg total) by mouth every 12 (twelve) hours.  What changed:  See the new instructions.        CONTINUE taking these medications    ergocalciferol 50,000 unit Cap  Commonly known as:  ERGOCALCIFEROL  Take 1 capsule (50,000 Units total) by mouth every 7 days. Take on Tues     ERTAPENEM (INVANZ) 1 G/100 ML NS (READY TO MIX)  Inject 100 mLs (1 g total) into the vein once daily. Stop: 4/15/19     estradiol 0.01 % (0.1 mg/gram) vaginal cream  Commonly known as:  ESTRACE  Place 0.5 g vaginally twice a week. Apply to the vagina daily for two weeks then twice a week.     famotidine 20 MG tablet  Commonly known as:  PEPCID  Take 1 tablet (20 mg total) by mouth 2 (two) times daily.     flash glucose scanning reader Misc  Commonly known as:  FREESTYLE DAKOTAH 14 DAY READER  Dispense 1 reader     flash glucose sensor Kit  Commonly known as:  FREESTYLE DAKOTAH 14 DAY SENSOR  Uses 1 kit monthly     insulin aspart U-100 100 unit/mL Inpn pen  Commonly known as:  NovoLOG Flexpen U-100 Insulin  Novolog 10 u AC + correction Max TDD 40u     insulin glargine 100 units/mL (3mL) SubQ pen  Commonly known as:  BASAGLAR KWIKPEN U-100 INSULIN  Inject 30 Units into the skin every evening.     INV acyclovir/placebo 400 MG capsule  Take 1 capsule (400 mg total) by mouth every 12 (twelve) hours.  For investigational use only. [id number]. Protocol: MK 8228-002     INV MK-8228/placebo 480 mg oral tablet  Take 1 tablet (480 mg total) by mouth once daily. FOR INVESTIGATIONAL USE ONLY. Patient Study# [id number]. Protocol: MK 8228-002     INV valganciclovir/placebo 450 mg tablet  Take 2 tablets (900 mg total) by mouth once daily.  FOR INVESTIGATIONAL USE ONLY. [id number]. Protocol: MK 8228-002     levothyroxine 75 MCG tablet  Commonly known as:  SYNTHROID  TAKE ONE TABLET BY MOUTH ONCE DAILY     magnesium oxide 400 mg (241.3 mg magnesium) tablet  Commonly known as:  MAG-OX  Take  "2 tablets (800 mg total) by mouth once daily.     multivitamin tablet  Commonly known as:  THERAGRAN  Take 1 tablet by mouth once daily.     oxybutynin 10 MG 24 hr tablet  Commonly known as:  DITROPAN-XL  Take 1 tablet (10 mg total) by mouth once daily.     pen needle, diabetic 31 gauge x 3/16" Ndle  Commonly known as:  BD ULTRA-FINE MINI PEN NEEDLE  USES 4  DAILY     rosuvastatin 10 MG tablet  Commonly known as:  CRESTOR  Take 1 tablet (10 mg total) by mouth once daily.     VIIBRYD 40 mg Tab tablet  Generic drug:  vilazodone  TAKE ONE TABLET BY MOUTH ONCE DAILY          Time spent caring for patient (Greater than 1/2 spent in direct face-to-face contact): > 30 minutes    Shante Green NP  Kidney Transplant  [facility]  "

## 2019-04-13 NOTE — PROGRESS NOTES
Discharge Medication Note:    Hospital Course:  60 y/o F with PMHx of DM/HTN nephropathy ESRD s/p living kidney transplant 1/14/2019, recurrent ESBL klebsiella UTIs (recently found to have pansensitive Klebsiella UTI 3/26/2019), and chronic lower back pain who presents to the ED with c/o fever. Pt reports that she has had fever (TMax 101.7 F) for the past 2 days with associated chills, diffuse headache, and cramping to suprapubic region, near surgical incision. She has been taking Tylenol for fever and pain reduction. Pt was admitted 4/2/19-4/5/19 for fevers and UTI. Patient afebrile since admission. Will discharge on ertapenem until 4/15.    Met with Andra Newell at discharge to review discharge medications and to update the blue medication card.       Andra Newell   Home Medication Instructions MARICARMEN:[id number]    Printed on:04/13/19 1023   Medication Information                      ergocalciferol (ERGOCALCIFEROL) 50,000 unit Cap  Take 1 capsule (50,000 Units total) by mouth every 7 days. Take on Tues             ertapenem sodium (ERTAPENEM, INVANZ, 1 G/100 ML NS, READY TO MIX,)  Inject 100 mLs (1 g total) into the vein once daily. Stop: 4/15/19             estradiol (ESTRACE) 0.01 % (0.1 mg/gram) vaginal cream  Place 0.5 g vaginally twice a week. Apply to the vagina daily for two weeks then twice a week.             famotidine (PEPCID) 20 MG tablet  Take 1 tablet (20 mg total) by mouth 2 (two) times daily.             flash glucose scanning reader (FREESTYLE DAKOTAH 14 DAY READER) Misc  Dispense 1 reader             flash glucose sensor (FREESTYLE DAKOTAH 14 DAY SENSOR) Kit  Uses 1 kit monthly             insulin (BASAGLAR KWIKPEN U-100 INSULIN) glargine 100 units/mL (3mL) SubQ pen  Inject 30 Units into the skin every evening.             insulin aspart U-100 (NOVOLOG FLEXPEN U-100 INSULIN) 100 unit/mL InPn pen  Novolog 10 u AC + correction Max TDD 40u             INV acyclovir/placebo capsule  Take  "1 capsule (400 mg total) by mouth every 12 (twelve) hours.  For investigational use only. 0238-05840. Protocol: MK 8228-002             INV MK-8228/placebo 480 mg oral tablet  Take 1 tablet (480 mg total) by mouth once daily. FOR INVESTIGATIONAL USE ONLY. Patient Study# [id number]. Protocol: MK 8228-002             INV valganciclovir/placebo tablet  Take 2 tablets (900 mg total) by mouth once daily.  FOR INVESTIGATIONAL USE ONLY. [id number]. Protocol: MK 8228-002             levothyroxine (SYNTHROID) 75 MCG tablet  TAKE ONE TABLET BY MOUTH ONCE DAILY             LORazepam (ATIVAN) 2 MG Tab  Take 1 tablet (2 mg total) by mouth 2 (two) times daily.             magnesium oxide (MAG-OX) 400 mg (241.3 mg magnesium) tablet  Take 2 tablets (800 mg total) by mouth once daily.             multivitamin (THERAGRAN) tablet  Take 1 tablet by mouth once daily.             oxybutynin (DITROPAN-XL) 10 MG 24 hr tablet  Take 1 tablet (10 mg total) by mouth once daily.             pen needle, diabetic (BD ULTRA-FINE MINI PEN NEEDLE) 31 gauge x 3/16" Ndle  USES 4  DAILY             rosuvastatin (CRESTOR) 10 MG tablet  Take 1 tablet (10 mg total) by mouth once daily.             tacrolimus (PROGRAF) 0.5 MG Cap  Take 3 capsules (1.5 mg total) by mouth every morning AND 3 capsules (1.5 mg total) every evening.             VIIBRYD 40 mg Tab tablet  TAKE ONE TABLET BY MOUTH ONCE DAILY                 Pt was provided with prescriptions for the following meds:  None.    The following medications have been placed on HOLD and should be restarted in the outpatient setting (when appropriate): Myfortic on hold    Andra Newell verbalized understanding and had the opportunity to ask questions.  "

## 2019-04-13 NOTE — PLAN OF CARE
Problem: Adult Inpatient Plan of Care  Goal: Plan of Care Review  Outcome: Ongoing (interventions implemented as appropriate)  POC reviewed with patient who verbalized understanding.  VSS on room air.  Patient AAOX4 and able to assist in their care.  Tolerated diet, denies nausea.  Pain controlled with PRN medications per MAR. Telemetry being monitored running NSR. No acute events. No distress noted. Bed in lowest position, call light within reach, frequent rounds made for safety. Remains free of falls and injury.  Will continue to monitor.

## 2019-04-13 NOTE — PLAN OF CARE
POC reviewed with pt, stated understanding, AOX4, VSS, X1 unit PRBCs last night, see labs for results. Denies pain, N/V/D this shift. AMB to BR to void. NO adverse events this shift, bed low, call light in reach. Will cont to manage POC.

## 2019-04-14 LAB
BACTERIA UR CULT: NO GROWTH
BACTERIA UR CULT: NO GROWTH

## 2019-04-15 ENCOUNTER — OFFICE VISIT (OUTPATIENT)
Dept: INFECTIOUS DISEASES | Facility: CLINIC | Age: 59
End: 2019-04-15
Payer: MEDICARE

## 2019-04-15 ENCOUNTER — PATIENT MESSAGE (OUTPATIENT)
Dept: TRANSPLANT | Facility: CLINIC | Age: 59
End: 2019-04-15

## 2019-04-15 ENCOUNTER — CLINICAL SUPPORT (OUTPATIENT)
Dept: CARDIOLOGY | Facility: CLINIC | Age: 59
End: 2019-04-15
Attending: INTERNAL MEDICINE
Payer: MEDICARE

## 2019-04-15 ENCOUNTER — INFUSION (OUTPATIENT)
Dept: INFECTIOUS DISEASES | Facility: HOSPITAL | Age: 59
End: 2019-04-15
Attending: INTERNAL MEDICINE
Payer: MEDICARE

## 2019-04-15 VITALS
SYSTOLIC BLOOD PRESSURE: 120 MMHG | WEIGHT: 210.75 LBS | BODY MASS INDEX: 33.08 KG/M2 | TEMPERATURE: 99 F | HEART RATE: 66 BPM | DIASTOLIC BLOOD PRESSURE: 72 MMHG | HEIGHT: 67 IN

## 2019-04-15 VITALS — WEIGHT: 211 LBS | HEIGHT: 67 IN | BODY MASS INDEX: 33.12 KG/M2

## 2019-04-15 DIAGNOSIS — Z79.899 LONG TERM CURRENT USE OF IMMUNOSUPPRESSIVE DRUG: ICD-10-CM

## 2019-04-15 DIAGNOSIS — N39.0 RECURRENT UTI: Primary | ICD-10-CM

## 2019-04-15 DIAGNOSIS — N39.0 URINARY TRACT INFECTION DUE TO ESBL KLEBSIELLA: ICD-10-CM

## 2019-04-15 DIAGNOSIS — B96.89 URINARY TRACT INFECTION DUE TO ESBL KLEBSIELLA: ICD-10-CM

## 2019-04-15 DIAGNOSIS — D64.9 ANEMIA, UNSPECIFIED TYPE: ICD-10-CM

## 2019-04-15 DIAGNOSIS — D53.9 NUTRITIONAL ANEMIA: ICD-10-CM

## 2019-04-15 DIAGNOSIS — Z94.0 KIDNEY REPLACED BY TRANSPLANT: ICD-10-CM

## 2019-04-15 DIAGNOSIS — Z79.2 RECEIVING INTRAVENOUS ANTIBIOTIC TREATMENT AS OUTPATIENT: ICD-10-CM

## 2019-04-15 LAB
CMV DNA SERPL NAA+PROBE-ACNC: NORMAL IU/ML
CRP SERPL-MCNC: 0.3 MG/DL
EXT ALBUMIN: 3.4
EXT ALKALINE PHOSPHATASE: 145
EXT ALT: 35
EXT ANC: 1240
EXT AST: 37
EXT BILIRUBIN TOTAL: 0.8
EXT BUN: 13
EXT CALCIUM: 8.6
EXT CHLORIDE: 108
EXT CO2: 23
EXT CREATININE: 0.84 MG/DL
EXT EOSINOPHIL%: 9.1
EXT GFR MDRD NON AF AMER: >60
EXT GLUCOSE: 128
EXT HEMATOCRIT: 27.6
EXT HEMOGLOBIN: 8.7
EXT LYMPH%: 15.2
EXT MONOCYTES%: 12.7
EXT PLATELETS: 141
EXT POTASSIUM: 4.3
EXT SEGS%: 62
EXT SODIUM: 140 MMOL/L
EXT WBC: 2
FOLATE SERPL-MCNC: 14.6 NG/ML (ref 4–24)
HAPTOGLOB SERPL-MCNC: 124 MG/DL (ref 30–250)
LDH SERPL L TO P-CCNC: 356 U/L (ref 110–260)
RETICS/RBC NFR AUTO: 0.6 % (ref 0.5–2.5)
SED RATE (WESTERGREN): 55
VIT B12 SERPL-MCNC: 414 PG/ML (ref 210–950)

## 2019-04-15 PROCEDURE — 87799 DETECT AGENT NOS DNA QUANT: CPT | Mod: 91

## 2019-04-15 PROCEDURE — 99499 UNLISTED E&M SERVICE: CPT | Mod: S$PBB,,, | Performed by: INTERNAL MEDICINE

## 2019-04-15 PROCEDURE — 87799 DETECT AGENT NOS DNA QUANT: CPT

## 2019-04-15 PROCEDURE — 82607 VITAMIN B-12: CPT

## 2019-04-15 PROCEDURE — 99215 OFFICE O/P EST HI 40 MIN: CPT | Mod: S$PBB,,, | Performed by: INTERNAL MEDICINE

## 2019-04-15 PROCEDURE — 99999 PR PBB SHADOW E&M-EST. PATIENT-LVL I: ICD-10-PCS | Mod: PBBFAC,,,

## 2019-04-15 PROCEDURE — 36415 COLL VENOUS BLD VENIPUNCTURE: CPT

## 2019-04-15 PROCEDURE — 99999 PR PBB SHADOW E&M-EST. PATIENT-LVL I: CPT | Mod: PBBFAC,,,

## 2019-04-15 PROCEDURE — 83010 ASSAY OF HAPTOGLOBIN QUANT: CPT

## 2019-04-15 PROCEDURE — 82746 ASSAY OF FOLIC ACID SERUM: CPT

## 2019-04-15 PROCEDURE — 99499 RISK ADDL DX/OHS AUDIT: ICD-10-PCS | Mod: S$PBB,,, | Performed by: INTERNAL MEDICINE

## 2019-04-15 PROCEDURE — 85045 AUTOMATED RETICULOCYTE COUNT: CPT

## 2019-04-15 PROCEDURE — 99215 PR OFFICE/OUTPT VISIT, EST, LEVL V, 40-54 MIN: ICD-10-PCS | Mod: S$PBB,,, | Performed by: INTERNAL MEDICINE

## 2019-04-15 PROCEDURE — 99999 PR PBB SHADOW E&M-EST. PATIENT-LVL III: ICD-10-PCS | Mod: PBBFAC,,, | Performed by: INTERNAL MEDICINE

## 2019-04-15 PROCEDURE — 99999 PR PBB SHADOW E&M-EST. PATIENT-LVL III: CPT | Mod: PBBFAC,,, | Performed by: INTERNAL MEDICINE

## 2019-04-15 PROCEDURE — 83615 LACTATE (LD) (LDH) ENZYME: CPT

## 2019-04-15 PROCEDURE — 99213 OFFICE O/P EST LOW 20 MIN: CPT | Mod: PBBFAC | Performed by: INTERNAL MEDICINE

## 2019-04-15 PROCEDURE — 99211 OFF/OP EST MAY X REQ PHY/QHP: CPT | Mod: PBBFAC,PO

## 2019-04-15 NOTE — PROGRESS NOTES
Labs drawn from BRISEIDA midline. Midline removed per MD order. Pt tolerated well and left in NAD.

## 2019-04-15 NOTE — PROGRESS NOTES
Subjective:      Patient ID: Andra Newell is a 59 y.o. female.    Chief Complaint: Recurrent ESBL Klebsiella UTI    History of Present Illness  59-year-old female with history of DM/HTN/antibiotic induced nephropathy c/b ESRD living kidney transplant (friend) on 1/14/2019 with campath induction, now on tacro for maintenance, CMV D+/R- in letermivir/acyclovir vs. valganciclovir CMV Prevention trial, presents for follow-up of recurrent UTI.     Patient recently had Klebsiella UTI - started on course of ciprofloxacin.  She subsequently developed fevers and fatigue. She presented to ED 4/2/2019 - infectious work-up was negative - UA normal, urine culture negative.  CT abd / pelvis unremarkable.  Patient was discharged on course of ertapenem for 14 day course.  While on ertapenem, she subsequently redeveloped fevers - presented to ED, repeat infectious work-up was unremarkable.  She was noted to be anemic, reported feeling less fatigued and SOB after blood transfusion.    Patient has completed course of ertapenem.  Reports not having any more urinary tract symptoms - no urgency, hematuria, dysuria.  Denied any fevers, chills, night sweats.  Still feels fatigued.  Denied having any blood in her stools.    Review of Systems   Constitution: Positive for chills, decreased appetite, fever and malaise/fatigue. Negative for night sweats, weight gain and weight loss.   HENT: Negative for congestion, ear pain, hearing loss, hoarse voice, sore throat and tinnitus.    Eyes: Negative for blurred vision, redness and visual disturbance.   Cardiovascular: Negative for chest pain, leg swelling and palpitations.   Respiratory: Positive for shortness of breath. Negative for cough, hemoptysis and sputum production.    Hematologic/Lymphatic: Negative for adenopathy. Does not bruise/bleed easily.   Skin: Negative for dry skin, itching, rash and suspicious lesions.   Musculoskeletal: Positive for back pain and myalgias. Negative for  joint pain and neck pain.   Gastrointestinal: Positive for diarrhea. Negative for abdominal pain, constipation, heartburn, nausea and vomiting.   Genitourinary: Positive for flank pain and urgency. Negative for dysuria, frequency, hematuria and hesitancy.   Neurological: Positive for weakness. Negative for dizziness, headaches, numbness and paresthesias.   Psychiatric/Behavioral: Positive for depression. Negative for memory loss. The patient does not have insomnia and is not nervous/anxious.      Objective:   Physical Exam   Constitutional: She is oriented to person, place, and time. She appears well-developed and well-nourished. No distress.   HENT:   Head: Normocephalic and atraumatic.   Eyes: Conjunctivae and EOM are normal.   Neck: Normal range of motion. Neck supple.   Pulmonary/Chest: Effort normal. No respiratory distress.   Abdominal: Soft. She exhibits no distension.   Musculoskeletal: Normal range of motion. She exhibits no edema.   Neurological: She is alert and oriented to person, place, and time.   Skin: Skin is warm and dry. No rash noted. She is not diaphoretic. No erythema.   Psychiatric: She has a normal mood and affect. Her behavior is normal.   Vitals reviewed.    Significant labs reviewed:  4/15/2019  WBC 2.0  Hgb 8.7  Plt 141  Cr 0.84    Assessment:   59-year-old female with history of DM/HTN/antibiotic induced nephropathy c/b ESRD living kidney transplant (friend) on 1/14/2019 with campath induction, now on tacro for maintenance, CMV D+/R- in letermivir/acyclovir vs. valganciclovir CMV Prevention trial, presents for follow-up of recurrent ESBL Kleb UTI, improved s/p 14 day course of ertapenem.  Patient continues to have fatigue, patient with pancytopenia on labs with normocytic anemia.    Plan:   - Discontinue ertapenem  - Remove PICC line  - Send B12, folate, haptoglobin, LDH, reticulocyte percentage  - Send EBV, Parvo B19, urine histoplasma to evaluate for infectious causes of  pancytopenia    Cammie Kessler MD MPH  Infectious Diseases [facility]

## 2019-04-16 ENCOUNTER — DOCUMENTATION ONLY (OUTPATIENT)
Dept: TRANSPLANT | Facility: CLINIC | Age: 59
End: 2019-04-16

## 2019-04-16 LAB
BACTERIA BLD CULT: NORMAL
BACTERIA BLD CULT: NORMAL

## 2019-04-17 LAB
ENTEROVIRUS: NOT DETECTED
HISTOPLASMA AG VALUE: 0 NG/ML
HISTOPLASMA ANTIGEN URINE: NEGATIVE
HUMAN BOCAVIRUS: NOT DETECTED
HUMAN CORONAVIRUS, COMMON COLD VIRUS: NOT DETECTED
INFLUENZA A - H1N1-09: NOT DETECTED
PARAINFLUENZA: NOT DETECTED
RVP - ADENOVIRUS: NOT DETECTED
RVP - HUMAN METAPNEUMOVIRUS (HMPV): NOT DETECTED
RVP - INFLUENZA A: NOT DETECTED
RVP - INFLUENZA B: NOT DETECTED
RVP - RESPIRATORY SYNCTIAL VIRUS (RSV) A: NOT DETECTED
RVP - RESPIRATORY VIRAL PANEL, SOURCE: NORMAL
RVP - RHINOVIRUS: NOT DETECTED

## 2019-04-18 ENCOUNTER — LAB VISIT (OUTPATIENT)
Dept: LAB | Facility: HOSPITAL | Age: 59
End: 2019-04-18
Attending: INTERNAL MEDICINE
Payer: MEDICARE

## 2019-04-18 DIAGNOSIS — Z94.0 KIDNEY REPLACED BY TRANSPLANT: ICD-10-CM

## 2019-04-18 DIAGNOSIS — R06.02 SOB (SHORTNESS OF BREATH): ICD-10-CM

## 2019-04-18 LAB
ALBUMIN SERPL BCP-MCNC: 3.6 G/DL (ref 3.5–5.2)
ALBUMIN SERPL BCP-MCNC: 3.6 G/DL (ref 3.5–5.2)
ALP SERPL-CCNC: 203 U/L (ref 55–135)
ALT SERPL W/O P-5'-P-CCNC: 32 U/L (ref 10–44)
ANION GAP SERPL CALC-SCNC: 9 MMOL/L (ref 8–16)
AST SERPL-CCNC: 29 U/L (ref 10–40)
B19V DNA SER QL NAA+PROBE: POSITIVE
BASOPHILS # BLD AUTO: 0.01 K/UL (ref 0–0.2)
BASOPHILS NFR BLD: 0.2 % (ref 0–1.9)
BILIRUB DIRECT SERPL-MCNC: 0.2 MG/DL (ref 0.1–0.3)
BILIRUB SERPL-MCNC: 0.5 MG/DL (ref 0.1–1)
BNP SERPL-MCNC: 46 PG/ML (ref 0–99)
BUN SERPL-MCNC: 10 MG/DL (ref 8–23)
CALCIUM SERPL-MCNC: 9.6 MG/DL (ref 8.7–10.5)
CHLORIDE SERPL-SCNC: 110 MMOL/L (ref 95–110)
CHOLEST SERPL-MCNC: 129 MG/DL (ref 120–199)
CHOLEST/HDLC SERPL: 5.2 {RATIO} (ref 2–5)
CO2 SERPL-SCNC: 26 MMOL/L (ref 23–29)
CREAT SERPL-MCNC: 1.1 MG/DL (ref 0.5–1.4)
DIFFERENTIAL METHOD: ABNORMAL
EBV DNA BY PCR: NORMAL IU/ML
EOSINOPHIL # BLD AUTO: 0.2 K/UL (ref 0–0.5)
EOSINOPHIL NFR BLD: 4.6 % (ref 0–8)
ERYTHROCYTE [DISTWIDTH] IN BLOOD BY AUTOMATED COUNT: 16.1 % (ref 11.5–14.5)
EST. GFR  (AFRICAN AMERICAN): >60 ML/MIN/1.73 M^2
EST. GFR  (NON AFRICAN AMERICAN): 54.3 ML/MIN/1.73 M^2
GLUCOSE SERPL-MCNC: 143 MG/DL (ref 70–110)
HCT VFR BLD AUTO: 27.7 % (ref 37–48.5)
HDLC SERPL-MCNC: 25 MG/DL (ref 40–75)
HDLC SERPL: 19.4 % (ref 20–50)
HGB BLD-MCNC: 8.8 G/DL (ref 12–16)
IMM GRANULOCYTES # BLD AUTO: 0.04 K/UL (ref 0–0.04)
IMM GRANULOCYTES NFR BLD AUTO: 0.9 % (ref 0–0.5)
LDLC SERPL CALC-MCNC: 41.2 MG/DL (ref 63–159)
LYMPHOCYTES # BLD AUTO: 0.3 K/UL (ref 1–4.8)
LYMPHOCYTES NFR BLD: 6.3 % (ref 18–48)
MAGNESIUM SERPL-MCNC: 1.3 MG/DL (ref 1.6–2.6)
MCH RBC QN AUTO: 29.7 PG (ref 27–31)
MCHC RBC AUTO-ENTMCNC: 31.8 G/DL (ref 32–36)
MCV RBC AUTO: 94 FL (ref 82–98)
MONOCYTES # BLD AUTO: 0.3 K/UL (ref 0.3–1)
MONOCYTES NFR BLD: 7 % (ref 4–15)
NEUTROPHILS # BLD AUTO: 3.7 K/UL (ref 1.8–7.7)
NEUTROPHILS NFR BLD: 81 % (ref 38–73)
NONHDLC SERPL-MCNC: 104 MG/DL
NRBC BLD-RTO: 0 /100 WBC
PHOSPHATE SERPL-MCNC: 3 MG/DL (ref 2.7–4.5)
PLATELET # BLD AUTO: 171 K/UL (ref 150–350)
PMV BLD AUTO: 11.1 FL (ref 9.2–12.9)
POTASSIUM SERPL-SCNC: 4.7 MMOL/L (ref 3.5–5.1)
PROT SERPL-MCNC: 6.9 G/DL (ref 6–8.4)
RBC # BLD AUTO: 2.96 M/UL (ref 4–5.4)
SODIUM SERPL-SCNC: 145 MMOL/L (ref 136–145)
SPECIMEN SOURCE: NORMAL
TRIGL SERPL-MCNC: 314 MG/DL (ref 30–150)
WBC # BLD AUTO: 4.58 K/UL (ref 3.9–12.7)

## 2019-04-18 PROCEDURE — 83735 ASSAY OF MAGNESIUM: CPT

## 2019-04-18 PROCEDURE — 83880 ASSAY OF NATRIURETIC PEPTIDE: CPT

## 2019-04-18 PROCEDURE — 82247 BILIRUBIN TOTAL: CPT

## 2019-04-18 PROCEDURE — 87799 DETECT AGENT NOS DNA QUANT: CPT

## 2019-04-18 PROCEDURE — 84075 ASSAY ALKALINE PHOSPHATASE: CPT

## 2019-04-18 PROCEDURE — 80061 LIPID PANEL: CPT

## 2019-04-18 PROCEDURE — 80069 RENAL FUNCTION PANEL: CPT

## 2019-04-18 PROCEDURE — 80197 ASSAY OF TACROLIMUS: CPT

## 2019-04-18 PROCEDURE — 85025 COMPLETE CBC W/AUTO DIFF WBC: CPT

## 2019-04-19 ENCOUNTER — NURSE TRIAGE (OUTPATIENT)
Dept: ADMINISTRATIVE | Facility: CLINIC | Age: 59
End: 2019-04-19

## 2019-04-19 ENCOUNTER — TELEPHONE (OUTPATIENT)
Dept: INFECTIOUS DISEASES | Facility: HOSPITAL | Age: 59
End: 2019-04-19

## 2019-04-19 LAB — TACROLIMUS BLD-MCNC: 10 NG/ML (ref 5–15)

## 2019-04-19 NOTE — TELEPHONE ENCOUNTER
Reason for Disposition   Lab result questions   Nursing judgment    Protocols used: INFORMATION ONLY CALL-A-, NO GUIDELINE OR REFERENCE [id number]    Kidney Txp 1/14/19  BPA 16    Pt EC calling for an order for HH nurse to do urinalysis for UTI symptoms. States HH nurse knows of open lab she can bring to.  states Dr. Mercado is on call and is reached via cell phone. Boogie  nurse given [facility]  number to be connected to Dr Mercado.

## 2019-04-19 NOTE — TELEPHONE ENCOUNTER
Patient informed of positive parvo B19 PCR in blood - patient CBC appears to be improving without any intervention.  Will continue to monitor CBC, if continues to have anemia requiring transfusion, will consider IVIg    Patient with dysuria, low grade fevers, and fatigue.  Patient advised to restart ciprofloxacin and call next week to assess any improvement in her symptoms.

## 2019-04-20 DIAGNOSIS — N18.6 ESRD ON DIALYSIS: ICD-10-CM

## 2019-04-20 DIAGNOSIS — Z99.2 ESRD ON DIALYSIS: ICD-10-CM

## 2019-04-22 ENCOUNTER — LAB VISIT (OUTPATIENT)
Dept: LAB | Facility: HOSPITAL | Age: 59
End: 2019-04-22
Attending: INTERNAL MEDICINE
Payer: MEDICARE

## 2019-04-22 ENCOUNTER — OFFICE VISIT (OUTPATIENT)
Dept: TRANSPLANT | Facility: CLINIC | Age: 59
End: 2019-04-22
Payer: MEDICARE

## 2019-04-22 ENCOUNTER — TELEPHONE (OUTPATIENT)
Dept: ADMINISTRATIVE | Facility: CLINIC | Age: 59
End: 2019-04-22

## 2019-04-22 VITALS
TEMPERATURE: 98 F | RESPIRATION RATE: 16 BRPM | WEIGHT: 207 LBS | SYSTOLIC BLOOD PRESSURE: 116 MMHG | HEART RATE: 87 BPM | DIASTOLIC BLOOD PRESSURE: 73 MMHG | OXYGEN SATURATION: 97 % | BODY MASS INDEX: 32.49 KG/M2 | HEIGHT: 67 IN

## 2019-04-22 DIAGNOSIS — E55.9 VITAMIN D DEFICIENCY: ICD-10-CM

## 2019-04-22 DIAGNOSIS — Z79.899 LONG TERM CURRENT USE OF IMMUNOSUPPRESSIVE DRUG: ICD-10-CM

## 2019-04-22 DIAGNOSIS — N18.30 ANEMIA IN STAGE 3 CHRONIC KIDNEY DISEASE: ICD-10-CM

## 2019-04-22 DIAGNOSIS — E87.20 METABOLIC ACIDOSIS: ICD-10-CM

## 2019-04-22 DIAGNOSIS — D63.1 ANEMIA IN STAGE 3 CHRONIC KIDNEY DISEASE: ICD-10-CM

## 2019-04-22 DIAGNOSIS — E78.2 MIXED HYPERLIPIDEMIA: Chronic | ICD-10-CM

## 2019-04-22 DIAGNOSIS — I10 ESSENTIAL HYPERTENSION: ICD-10-CM

## 2019-04-22 DIAGNOSIS — Z94.0 KIDNEY REPLACED BY TRANSPLANT: ICD-10-CM

## 2019-04-22 DIAGNOSIS — R19.7 DIARRHEA, UNSPECIFIED TYPE: ICD-10-CM

## 2019-04-22 DIAGNOSIS — Z94.0 KIDNEY TRANSPLANT RECIPIENT: Primary | ICD-10-CM

## 2019-04-22 DIAGNOSIS — Z87.440 HISTORY OF RECURRENT URINARY TRACT INFECTION: ICD-10-CM

## 2019-04-22 DIAGNOSIS — Z94.0 STATUS POST LIVING-DONOR KIDNEY TRANSPLANTATION: ICD-10-CM

## 2019-04-22 DIAGNOSIS — Z29.89 PROPHYLACTIC IMMUNOTHERAPY: ICD-10-CM

## 2019-04-22 DIAGNOSIS — N18.30 CHRONIC KIDNEY DISEASE (CKD), STAGE III (MODERATE): Chronic | ICD-10-CM

## 2019-04-22 DIAGNOSIS — Z91.89 AT RISK FOR OPPORTUNISTIC INFECTIONS: ICD-10-CM

## 2019-04-22 LAB
ALBUMIN SERPL BCP-MCNC: 3.6 G/DL (ref 3.5–5.2)
ANION GAP SERPL CALC-SCNC: 12 MMOL/L (ref 8–16)
BASOPHILS NFR BLD: 0 % (ref 0–1.9)
BKV DNA SERPL NAA+PROBE-ACNC: <125 COPIES/ML
BKV DNA SERPL NAA+PROBE-LOG#: <2.1 LOG (10) COPIES/ML
BKV DNA SERPL QL NAA+PROBE: NOT DETECTED
BUN SERPL-MCNC: 16 MG/DL (ref 8–23)
CALCIUM SERPL-MCNC: 9 MG/DL (ref 8.7–10.5)
CHLORIDE SERPL-SCNC: 111 MMOL/L (ref 95–110)
CO2 SERPL-SCNC: 20 MMOL/L (ref 23–29)
CREAT SERPL-MCNC: 1.4 MG/DL (ref 0.5–1.4)
DIFFERENTIAL METHOD: ABNORMAL
EOSINOPHIL NFR BLD: 0 % (ref 0–8)
ERYTHROCYTE [DISTWIDTH] IN BLOOD BY AUTOMATED COUNT: 15.9 % (ref 11.5–14.5)
EST. GFR  (AFRICAN AMERICAN): 46.8 ML/MIN/1.73 M^2
EST. GFR  (NON AFRICAN AMERICAN): 40.6 ML/MIN/1.73 M^2
GLUCOSE SERPL-MCNC: 174 MG/DL (ref 70–110)
HCT VFR BLD AUTO: 26.3 % (ref 37–48.5)
HGB BLD-MCNC: 8.5 G/DL (ref 12–16)
IMM GRANULOCYTES # BLD AUTO: ABNORMAL K/UL (ref 0–0.04)
IMM GRANULOCYTES NFR BLD AUTO: ABNORMAL % (ref 0–0.5)
LYMPHOCYTES NFR BLD: 16 % (ref 18–48)
MAGNESIUM SERPL-MCNC: 1.4 MG/DL (ref 1.6–2.6)
MCH RBC QN AUTO: 29.9 PG (ref 27–31)
MCHC RBC AUTO-ENTMCNC: 32.3 G/DL (ref 32–36)
MCV RBC AUTO: 93 FL (ref 82–98)
MONOCYTES NFR BLD: 16 % (ref 4–15)
NEUTROPHILS NFR BLD: 68 % (ref 38–73)
NRBC BLD-RTO: 0 /100 WBC
PHOSPHATE SERPL-MCNC: 3.1 MG/DL (ref 2.7–4.5)
PLATELET # BLD AUTO: 159 K/UL (ref 150–350)
PMV BLD AUTO: 10.9 FL (ref 9.2–12.9)
POTASSIUM SERPL-SCNC: 4.8 MMOL/L (ref 3.5–5.1)
RBC # BLD AUTO: 2.84 M/UL (ref 4–5.4)
SODIUM SERPL-SCNC: 143 MMOL/L (ref 136–145)
WBC # BLD AUTO: 1.82 K/UL (ref 3.9–12.7)

## 2019-04-22 PROCEDURE — 83735 ASSAY OF MAGNESIUM: CPT

## 2019-04-22 PROCEDURE — 99499 UNLISTED E&M SERVICE: CPT | Mod: S$PBB,,, | Performed by: HOSPITALIST

## 2019-04-22 PROCEDURE — 99499 RISK ADDL DX/OHS AUDIT: ICD-10-PCS | Mod: S$PBB,,, | Performed by: HOSPITALIST

## 2019-04-22 PROCEDURE — 85007 BL SMEAR W/DIFF WBC COUNT: CPT

## 2019-04-22 PROCEDURE — 36415 COLL VENOUS BLD VENIPUNCTURE: CPT | Mod: PO

## 2019-04-22 PROCEDURE — 80069 RENAL FUNCTION PANEL: CPT

## 2019-04-22 PROCEDURE — 99215 OFFICE O/P EST HI 40 MIN: CPT | Mod: S$PBB,GC,, | Performed by: INTERNAL MEDICINE

## 2019-04-22 PROCEDURE — 85027 COMPLETE CBC AUTOMATED: CPT

## 2019-04-22 PROCEDURE — 99213 OFFICE O/P EST LOW 20 MIN: CPT | Mod: PBBFAC | Performed by: INTERNAL MEDICINE

## 2019-04-22 PROCEDURE — 99999 PR PBB SHADOW E&M-EST. PATIENT-LVL III: CPT | Mod: PBBFAC,,, | Performed by: INTERNAL MEDICINE

## 2019-04-22 PROCEDURE — 80197 ASSAY OF TACROLIMUS: CPT

## 2019-04-22 PROCEDURE — 99215 PR OFFICE/OUTPT VISIT, EST, LEVL V, 40-54 MIN: ICD-10-PCS | Mod: S$PBB,GC,, | Performed by: INTERNAL MEDICINE

## 2019-04-22 PROCEDURE — 99999 PR PBB SHADOW E&M-EST. PATIENT-LVL III: ICD-10-PCS | Mod: PBBFAC,,, | Performed by: INTERNAL MEDICINE

## 2019-04-22 RX ORDER — PENTAMIDINE ISETHIONATE 300 MG/300MG
300 INHALANT RESPIRATORY (INHALATION)
Status: CANCELLED | OUTPATIENT
Start: 2019-04-24

## 2019-04-22 RX ORDER — ALBUTEROL SULFATE 0.83 MG/ML
2.5 SOLUTION RESPIRATORY (INHALATION)
Status: CANCELLED | OUTPATIENT
Start: 2019-04-22

## 2019-04-22 RX ORDER — ALBUTEROL SULFATE 0.83 MG/ML
2.5 SOLUTION RESPIRATORY (INHALATION)
Status: CANCELLED | OUTPATIENT
Start: 2019-04-24

## 2019-04-22 RX ORDER — PENTAMIDINE ISETHIONATE 300 MG/300MG
300 INHALANT RESPIRATORY (INHALATION)
Status: CANCELLED | OUTPATIENT
Start: 2019-04-22

## 2019-04-22 NOTE — LETTER
April 26, 2019        Dalton Heaton  [address] [facility] [place]  [place] [address]  Phone: [phone]  Fax: [phone]             [facility]  [address]  [facility] [address]  Phone: [phone]   Patient: Andra Newell   MR Number: [id number]   YOB: 1960   Date of Visit: 4/22/2019       Dear Dr. Dalton Heaton    Thank you for referring Andra Newell to me for evaluation. Attached you will find relevant portions of my assessment and plan of care.    If you have questions, please do not hesitate to call me. I look forward to following Andra Newell along with you.    Sincerely,    Maureen Cabral MD    Enclosure    If you would like to receive this communication electronically, please contact [email or web address] or [phone] to request Quarterly Link access.    Quarterly Link is a tool which provides read-only access to select patient information with whom you have a relationship. Its easy to use and provides real time access to review your patients record including encounter summaries, notes, results, and demographic information.    If you feel you have received this communication in error or would no longer like to receive these types of communications, please e-mail [email or web address]

## 2019-04-23 ENCOUNTER — TELEPHONE (OUTPATIENT)
Dept: TRANSPLANT | Facility: CLINIC | Age: 59
End: 2019-04-23

## 2019-04-23 LAB — TACROLIMUS BLD-MCNC: 9.2 NG/ML (ref 5–15)

## 2019-04-23 RX ORDER — LORAZEPAM 2 MG/1
TABLET ORAL
Qty: 16 TABLET | Refills: 0 | Status: SHIPPED | OUTPATIENT
Start: 2019-04-23 | End: 2019-05-05 | Stop reason: SDUPTHER

## 2019-04-25 ENCOUNTER — LAB VISIT (OUTPATIENT)
Dept: LAB | Facility: HOSPITAL | Age: 59
End: 2019-04-25
Attending: INTERNAL MEDICINE
Payer: MEDICARE

## 2019-04-25 DIAGNOSIS — Z94.0 KIDNEY REPLACED BY TRANSPLANT: ICD-10-CM

## 2019-04-25 LAB
BASOPHILS # BLD AUTO: 0.02 K/UL (ref 0–0.2)
BASOPHILS NFR BLD: 0.4 % (ref 0–1.9)
DIFFERENTIAL METHOD: ABNORMAL
EOSINOPHIL # BLD AUTO: 0.1 K/UL (ref 0–0.5)
EOSINOPHIL NFR BLD: 2.8 % (ref 0–8)
ERYTHROCYTE [DISTWIDTH] IN BLOOD BY AUTOMATED COUNT: 16 % (ref 11.5–14.5)
HCT VFR BLD AUTO: 25.6 % (ref 37–48.5)
HGB BLD-MCNC: 8.1 G/DL (ref 12–16)
IMM GRANULOCYTES # BLD AUTO: 0.02 K/UL (ref 0–0.04)
IMM GRANULOCYTES NFR BLD AUTO: 0.4 % (ref 0–0.5)
LYMPHOCYTES # BLD AUTO: 0.5 K/UL (ref 1–4.8)
LYMPHOCYTES NFR BLD: 9.8 % (ref 18–48)
MCH RBC QN AUTO: 30 PG (ref 27–31)
MCHC RBC AUTO-ENTMCNC: 31.6 G/DL (ref 32–36)
MCV RBC AUTO: 95 FL (ref 82–98)
MONOCYTES # BLD AUTO: 0.3 K/UL (ref 0.3–1)
MONOCYTES NFR BLD: 7.2 % (ref 4–15)
NEUTROPHILS # BLD AUTO: 3.7 K/UL (ref 1.8–7.7)
NEUTROPHILS NFR BLD: 79.4 % (ref 38–73)
NRBC BLD-RTO: 0 /100 WBC
PLATELET # BLD AUTO: 152 K/UL (ref 150–350)
PMV BLD AUTO: 11.1 FL (ref 9.2–12.9)
RBC # BLD AUTO: 2.7 M/UL (ref 4–5.4)
WBC # BLD AUTO: 4.7 K/UL (ref 3.9–12.7)

## 2019-04-25 PROCEDURE — 85025 COMPLETE CBC W/AUTO DIFF WBC: CPT

## 2019-04-25 PROCEDURE — 36415 COLL VENOUS BLD VENIPUNCTURE: CPT | Mod: PO

## 2019-04-26 LAB — CMV DNA SERPL NAA+PROBE-ACNC: NORMAL IU/ML

## 2019-04-26 NOTE — PROGRESS NOTES
Kidney Post-Transplant Assessment    Referring Physician: Dalton Heaton  Current Nephrologist: Dalton Heaton    ORGAN: LEFT KIDNEY  Donor Type: living  PHS Increased Risk: no  Cold Ischemia: 37 mins  Induction Medications: campath - alemtuzumab (anti-cd52)    Subjective:     CC:  Reassessment of renal allograft function and management of chronic immunosuppression.    HPI:  Ms. Newell is a 59 y.o. year old White female who received a living kidney transplant on 1/14/19. Her most recent creatinine is 1.1. She takes mycophenolate mofetil (held d/t recurrent ESBL UTIs Jan-Feb 2019) and tacrolimus for maintenance immunosuppression. Her post transplant course has been uncomplicated to date.    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 She briefly required HD 4/13/18 until ~Sept 2018.  she was predialysis at the time of transplant.   -LURD KT (friend) 01/14/2019,  Induced with Campath.     -Recurrent K pneumoniae ESBL 2019 UTI 1/30, 2/11, and  2/25  -Admit for unsteadiness and dizziness with work up WNL, MRI pending (r/s d/t hosp admit)    PCP PROPHYLAXIS: Until 1/14/20; Atovaquone; Bactrim stopped 01/24/2019 D/T hiK  CMV PROPHYLAXIS: CMV  Mismatch -study participant. Rx until 7/14/19  FUNGAL PROPHYLAXIS: None    She is here post DC from hospital secondary to Fever and UTI after cystoscopy on 4/11  Ms Silverman c/o constant diarrhea described as dark angelo like stool. She endorses 4-5 BM daily. In addition she c/o Lower back pain   She has lost weight ~ 30 lbs. She is really not that active but she states she has poor appetite. She has had Left knee replacement.  She has been off MMF d/t recurrent UTIs.  She still has fatigue, but notes it's a little better but remains not very active. She denies fever, chills, CP, SOB or abdominal pain.    Review of Systems   Constitutional: Positive for fatigue. Negative for fever.   Eyes: Negative for visual  "disturbance.   Respiratory: Negative for shortness of breath.    Cardiovascular: Negative for chest pain and leg swelling.   Gastrointestinal: Negative.    Genitourinary: Negative for difficulty urinating.   Musculoskeletal: Negative.    Skin: Negative for rash.   Allergic/Immunologic: Positive for immunocompromised state.   Neurological: Negative for tremors.     Objective: /73 (BP Location: Right arm, Patient Position: Sitting, BP Method: Medium (Automatic))   Pulse 87   Temp 98.2 °F (36.8 °C) (Oral)   Resp 16   Ht 5' 7" (1.702 m)   Wt 93.9 kg (207 lb 0.2 oz)   LMP 02/28/2012   SpO2 97%   BMI 32.42 kg/m²       Physical Exam   Constitutional: No distress.   Cardiovascular: Normal rate and regular rhythm.   Pulmonary/Chest: Effort normal and breath sounds normal. No respiratory distress.   Abdominal: Soft. Bowel sounds are normal. She exhibits no mass. There is no tenderness.   Musculoskeletal: She exhibits no edema.   Skin: Skin is warm and dry.   Psychiatric: She has a normal mood and affect.     Labs:  Lab Results   Component Value Date    WBC 4.70 04/25/2019    HGB 8.1 (L) 04/25/2019    HCT 25.6 (L) 04/25/2019     04/22/2019    K 4.8 04/22/2019     (H) 04/22/2019    CO2 20 (L) 04/22/2019    BUN 16 04/22/2019    CREATININE 1.4 04/22/2019    EGFRNONAA 40.6 (A) 04/22/2019    CALCIUM 9.0 04/22/2019    PHOS 3.1 04/22/2019    MG 1.4 (L) 04/22/2019    ALBUMIN 3.6 04/22/2019    AST 29 04/18/2019    ALT 32 04/18/2019    UTPCR 0.08 04/10/2019    .0 (H) 02/15/2019    TACROLIMUS 9.2 04/22/2019     Lab Results   Component Value Date    EXTANC 1,240 04/15/2019    EXTWBC 2.0 (L) 04/15/2019    EXTSEGS 62.0 04/15/2019    EXTPLATELETS 141 04/15/2019    EXTHEMOGLOBI 8.7 (L) 04/15/2019    EXTHEMATOCRI 27.6 (L) 04/15/2019    EXTCREATININ 0.84 04/15/2019    EXTSODIUM 140 04/15/2019    EXTPOTASSIUM 4.3 04/15/2019    EXTBUN 13 04/15/2019    EXTCO2 23.0 04/15/2019    EXTCALCIUM 8.6 04/15/2019    " EXTGLUCOSE 128 (H) 04/15/2019    EXTALBUMIN 3.4 04/15/2019    EXTAST 37 04/15/2019    EXTALT 35 (H) 04/15/2019    EXTBILITOTAL 0.8 04/15/2019       No results found for: EXTCYCLOSLVL, EXTSIROLIMUS, EXTTACROLVL, EXTPROTCRE, EXTPTHINTACT, EXTPROTEINUA, EXTWBCUA, EXTRBCUA    Labs were reviewed with the patient    Assessment:     1. Kidney transplant recipient    2. Status post living-donor kidney transplantation    3. Long term current use of immunosuppressive drug    4. At risk for opportunistic infections    5. Prophylactic immunotherapy    6. CKD stage III (moderate)    7. Essential hypertension    8. Mixed hyperlipidemia    9. History of recurrent urinary tract infection    10. Metabolic acidosis    11. Anemia in stage 3 chronic kidney disease    12. Uncontrolled type 2 diabetes mellitus, with long-term current use of insulin    13. Vitamin D deficiency    14. Diarrhea, unspecified type        Plan:   -S/P LRKTx 1/14/2019: CKD3a with stable renal function, doing well. sCr 1.4 mg/dL today. Continue to monitor renal function and electrolytes, HTN, secondary hyperparathyroidism and other issues related to underlying ESRD.      -Continue tacrolimus-based immunosuppression. Goal 7-10, presently at target.  Will trend immunosuppression levels. Must watch for drug toxicity and med-related side effects.      -Hyperkalemia by history- K is better, continue low-potassium diet and p.r.n. Kayexalate.  No need for chronic medication therapy at present.    -Hypophosphatemia - resolved w/o meds supplement.  Continue to monitor.    -Hypomagnesemia- will continue magnesium oxide 800 mg daily;  Will tolerate mild asymptomatic low level d/t pill burden, DDI, and adverse SE     -OI prophylaxis:     -Bactrim d/c'd d/t hyperkalemia, she could not tolerate atovaquone 2/2 significant N/V. Start Pentamidine     -Valcyte study medication for CMV prophylaxis held last admit; OK to resume.    -Fatigue & weakness:  See my prior notes. No new  plans.    - Diarrhea: had previous stools studies in early April but describes a change in stools. Will repeat stools    - h/o mood disorder:  Cont pre-txp meds    -anemia of CKD3a versus chronic disease versus drug effect.  Also has known iron deficiency.  Weekly Procrit ordered to keep hgb 9-10 as per guidelines.    - Controlled BP, will continue to monitor. Goal for BP is <130 mmHg SBP and BDP <80 mmHg.        Follow-up:   Clinic: return to transplant clinic weekly for the first month after transplant; every 2 weeks during months 2-3; then at 6-, 9-, 12-, 18-, 24-, and 36- months post-transplant to reassess for complications from immunosuppression toxicity and monitor for rejection.  Annually thereafter.    Labs: since patient remains at high risk for rejection and drug-related complications that warrant close monitoring, labs will be ordered as follows: continue twice weekly CBC, renal panel, and drug level for first month; then same labs once weekly through 3rd month post-transplant.  Urine for UA and protein/creatinine ratio monthly.  Urine BK - PCR at 1-, 3-, 6-, 9-, 12-, 18-, 24-, and 36- months post-transplant.  Hepatic panel at 1-, 2-, 3-, 6-, 9-, 12-, 18-, 24-, and 36- months post-transplant.    Discussed with Dr Freeman Perdue MD  Transplant Nephrology Fellow        Staff Attestation:   Seen and agree with  Dr. Perdue as documented in his attached note.  I have verified the history and examined the patient.  I concur with the Assessment and plan as outlined.     Andra continues to have recurrent urinary tract infections.  She is being followed by both uro-gynecology and Infectious Disease.  Will pursue if she may be able to get anemia management with her nephrologist,  Which will be much more convenient for her.   She was encouraged today to reestablish care.

## 2019-04-27 ENCOUNTER — NURSE TRIAGE (OUTPATIENT)
Dept: ADMINISTRATIVE | Facility: CLINIC | Age: 59
End: 2019-04-27

## 2019-04-27 ENCOUNTER — HOSPITAL ENCOUNTER (EMERGENCY)
Facility: HOSPITAL | Age: 59
Discharge: HOME | End: 2019-04-28
Attending: EMERGENCY MEDICINE | Admitting: INTERNAL MEDICINE
Payer: MEDICARE

## 2019-04-27 VITALS
HEART RATE: 70 BPM | BODY MASS INDEX: 32.49 KG/M2 | DIASTOLIC BLOOD PRESSURE: 70 MMHG | SYSTOLIC BLOOD PRESSURE: 155 MMHG | RESPIRATION RATE: 18 BRPM | HEIGHT: 67 IN | OXYGEN SATURATION: 95 % | WEIGHT: 207 LBS | TEMPERATURE: 100 F

## 2019-04-27 DIAGNOSIS — R50.9 FEVER IN ADULT: Primary | ICD-10-CM

## 2019-04-27 DIAGNOSIS — B37.0 THRUSH: ICD-10-CM

## 2019-04-27 DIAGNOSIS — J06.9 VIRAL UPPER RESPIRATORY INFECTION: ICD-10-CM

## 2019-04-27 LAB
ALBUMIN SERPL BCP-MCNC: 3.6 G/DL (ref 3.5–5.2)
ALP SERPL-CCNC: 159 U/L (ref 55–135)
ALT SERPL W/O P-5'-P-CCNC: 33 U/L (ref 10–44)
ANION GAP SERPL CALC-SCNC: 9 MMOL/L (ref 8–16)
AST SERPL-CCNC: 31 U/L (ref 10–40)
BASOPHILS # BLD AUTO: 0 K/UL (ref 0–0.2)
BASOPHILS NFR BLD: 0.2 % (ref 0–1.9)
BILIRUB SERPL-MCNC: 0.5 MG/DL (ref 0.1–1)
BILIRUB UR QL STRIP: NEGATIVE
BUN SERPL-MCNC: 13 MG/DL (ref 8–23)
CALCIUM SERPL-MCNC: 8.8 MG/DL (ref 8.7–10.5)
CHLORIDE SERPL-SCNC: 109 MMOL/L (ref 95–110)
CLARITY UR: CLEAR
CO2 SERPL-SCNC: 23 MMOL/L (ref 23–29)
COLOR UR: YELLOW
CREAT SERPL-MCNC: 1.3 MG/DL (ref 0.5–1.4)
DIFFERENTIAL METHOD: ABNORMAL
EOSINOPHIL # BLD AUTO: 0.1 K/UL (ref 0–0.5)
EOSINOPHIL NFR BLD: 1.8 % (ref 0–8)
ERYTHROCYTE [DISTWIDTH] IN BLOOD BY AUTOMATED COUNT: 17.7 % (ref 11.5–14.5)
EST. GFR  (AFRICAN AMERICAN): 51 ML/MIN/1.73 M^2
EST. GFR  (NON AFRICAN AMERICAN): 44 ML/MIN/1.73 M^2
GLUCOSE SERPL-MCNC: 185 MG/DL (ref 70–110)
GLUCOSE UR QL STRIP: NEGATIVE
HCT VFR BLD AUTO: 22.9 % (ref 37–48.5)
HGB BLD-MCNC: 7.5 G/DL (ref 12–16)
HGB UR QL STRIP: NEGATIVE
INFLUENZA A, MOLECULAR: NEGATIVE
INFLUENZA B, MOLECULAR: NEGATIVE
KETONES UR QL STRIP: NEGATIVE
LACTATE SERPL-SCNC: 1.4 MMOL/L (ref 0.5–2.2)
LEUKOCYTE ESTERASE UR QL STRIP: NEGATIVE
LYMPHOCYTES # BLD AUTO: 0.3 K/UL (ref 1–4.8)
LYMPHOCYTES NFR BLD: 7 % (ref 18–48)
MCH RBC QN AUTO: 28.6 PG (ref 27–31)
MCHC RBC AUTO-ENTMCNC: 32.8 G/DL (ref 32–36)
MCV RBC AUTO: 87 FL (ref 82–98)
MONOCYTES # BLD AUTO: 0.5 K/UL (ref 0.3–1)
MONOCYTES NFR BLD: 11 % (ref 4–15)
NEUTROPHILS # BLD AUTO: 3.5 K/UL (ref 1.8–7.7)
NEUTROPHILS NFR BLD: 80 % (ref 38–73)
NITRITE UR QL STRIP: NEGATIVE
PH UR STRIP: 6 [PH] (ref 5–8)
PLATELET # BLD AUTO: 189 K/UL (ref 150–350)
PMV BLD AUTO: 7.9 FL (ref 9.2–12.9)
POTASSIUM SERPL-SCNC: 4.2 MMOL/L (ref 3.5–5.1)
PROCALCITONIN SERPL IA-MCNC: 0.07 NG/ML
PROT SERPL-MCNC: 6.7 G/DL (ref 6–8.4)
PROT UR QL STRIP: ABNORMAL
RBC # BLD AUTO: 2.63 M/UL (ref 4–5.4)
SODIUM SERPL-SCNC: 141 MMOL/L (ref 136–145)
SP GR UR STRIP: 1.02 (ref 1–1.03)
SPECIMEN SOURCE: NORMAL
URN SPEC COLLECT METH UR: ABNORMAL
UROBILINOGEN UR STRIP-ACNC: NEGATIVE EU/DL
WBC # BLD AUTO: 4.4 K/UL (ref 3.9–12.7)

## 2019-04-27 PROCEDURE — 87040 BLOOD CULTURE FOR BACTERIA: CPT

## 2019-04-27 PROCEDURE — 83605 ASSAY OF LACTIC ACID: CPT

## 2019-04-27 PROCEDURE — 80197 ASSAY OF TACROLIMUS: CPT

## 2019-04-27 PROCEDURE — 87502 INFLUENZA DNA AMP PROBE: CPT

## 2019-04-27 PROCEDURE — 25000003 PHARM REV CODE 250: Performed by: EMERGENCY MEDICINE

## 2019-04-27 PROCEDURE — 84145 PROCALCITONIN (PCT): CPT

## 2019-04-27 PROCEDURE — 80053 COMPREHEN METABOLIC PANEL: CPT

## 2019-04-27 PROCEDURE — 81003 URINALYSIS AUTO W/O SCOPE: CPT

## 2019-04-27 PROCEDURE — 85025 COMPLETE CBC W/AUTO DIFF WBC: CPT

## 2019-04-27 PROCEDURE — 36415 COLL VENOUS BLD VENIPUNCTURE: CPT

## 2019-04-27 PROCEDURE — 99284 EMERGENCY DEPT VISIT MOD MDM: CPT | Mod: 25

## 2019-04-27 PROCEDURE — 96360 HYDRATION IV INFUSION INIT: CPT

## 2019-04-27 RX ADMIN — SODIUM CHLORIDE 1000 ML: 0.9 INJECTION, SOLUTION INTRAVENOUS at 10:04

## 2019-04-27 NOTE — TELEPHONE ENCOUNTER
Reason for Disposition   Caller requesting a NON-URGENT new prescription or refill and triager unable to refill per unit policy    Protocols used: MEDICATION QUESTION CALL-A-    Caregiver calling in regards to Pt losing hair.  Care giver wanted to know if Pt could start taking Biotin.  Care advise given.    Kidney Txp: 1/14/19  BPA: 9

## 2019-04-28 LAB — TACROLIMUS BLD-MCNC: 5.9 NG/ML (ref 5–15)

## 2019-04-28 RX ORDER — NYSTATIN 100000 [USP'U]/ML
6 SUSPENSION ORAL 4 TIMES DAILY
Qty: 200 ML | Refills: 0 | Status: SHIPPED | OUTPATIENT
Start: 2019-04-28 | End: 2019-04-30 | Stop reason: ALTCHOICE

## 2019-04-28 NOTE — ED PROVIDER NOTES
"Encounter Date: 4/27/2019    SCRIBE #1 NOTE: I, Jonnathan Zavaleta, am scribing for, and in the presence of, Dr. Lopez.       History     Chief Complaint   Patient presents with    Fever     K[facility]eney transplant 3 months ago.  Spiked fever tonight 101     This is a 59 y.o. female who presents to the ED complaining of fever today. Her first recorded temperature is 99.5, then she recorded a 101.2 degree. Confirms cough, rhinorrhea,dysuria, diarrhea, fatigue, and nausea. Denies sore throat and CP. The pt relays intermittent abdominal pain. Imodium alleviates her diarrhea, which started and has persisted since her kidney transplant. The coughing started 2 days ago. Walking induces SOB and legs feel like "jelly."  She received a recent kidney transplant. Per wife, the pt's RBC was low and her WBC 2 weeks ago was low, but is improving. Per doctor's advice, the pt was instructed to not take tylenol and head to into ED for a fever.         Review of patient's allergies indicates:   Allergen Reactions    Torsemide Diarrhea     Diarrhea stopped once discontinued med    Codeine Itching     Can take oxycodone and hydrocodone with Benadryl     Past Medical History:   Diagnosis Date    Anemia     Anemia in chronic kidney disease, on chronic dialysis 7/12/2017    Arthritis     Asthma     allergic airway    CKD stage III (moderate) 2/18/2019    Colon polyp     Depression     Diabetes mellitus     Diverticulosis     Eosinophilia     ESRD (end stage renal disease)     stage V, due for living donor 9/2018    ESRD on dialysis     GERD (gastroesophageal reflux disease)     Gout     Gout     Hyperlipidemia     Hypertension     Low back pain     MRSA carrier     Obesity     Renal disorder     Rotator cuff syndrome--left     Thyroid disease     Ulnar neuropathy of right upper extremity     Uncontrolled type 2 diabetes mellitus with hyperglycemia      Past Surgical History:   Procedure Laterality Date    ACHILLES " TENDON SURGERY Left May 2015    ARTHROSCOPY, HIP Left 10/3/2013    [facility]    ARTHROSCOPY-HIP WITH TROCHANTERIC BURSECTOMY Left 10/3/2013    [facility]    ARTHROSCOPY-SHOULDER Left 11/24/2015    [facility]    ARTHROSCOPY-SHOULDER WITH SUBACROMIAL DECOMPRESSION Left 7/12/2016    [facility]    BACK SURGERY      CHOLECYSTECTOMY      COLONOSCOPY N/A 9/6/2017    [facility]    DEBRIDEMENT-SHOULDER-ARTHROSCOPIC Left 7/12/2016    [facility]    DEBRIDEMENT-SHOULDER-ARTHROSCOPIC; LABRAL Left 11/24/2015    [facility]    DIALYSIS FISTULA CREATION  12/2017    FEMOROPLASTY, ARTHROSCOPIC Left 10/3/2013    [facility]    HEMORRHOID SURGERY      INJECTION-AMNIOX Left 7/12/2016    [facility]    JOINT REPLACEMENT      left    KNEE SURGERY      LYSIS-ADHESION Left 11/24/2015    [facility]    REPAIR ROTATOR CUFF ARTHROSCOPIC Left 7/12/2016    [facility]    REPAIR-TENDON-BICEP Left 11/24/2015    [facility]    SHOULDER ARTHROSCOPY      SHOULDER SURGERY      TRANSPLANT, KIDNEY N/A 1/14/2019    [facility] at [facility] OR [address] [place] [address]    UPPER GASTROINTESTINAL ENDOSCOPY  ~2013     [facility]     Family History   Problem Relation Age of Onset    Diabetes Mother     Alzheimer's disease Mother     Thyroid disease Mother     Hyperlipidemia Mother     Heart disease Father     Stroke Father     Diabetes Sister     Lupus Sister     Ovarian cancer Sister     Heart disease Brother     No Known Problems Son     Diabetes Maternal Grandmother     Hypertension Maternal Grandmother     No Known Problems Paternal Grandmother   "   No Known Problems Paternal Grandfather     COPD Maternal Aunt     Diabetes Maternal Aunt     Heart disease Maternal Aunt     Hypertension Maternal Aunt     No Known Problems Maternal Uncle     No Known Problems Paternal Aunt     No Known Problems Paternal Uncle     Colon cancer Neg Hx     Colon polyps Neg Hx     Crohn's disease Neg Hx     Ulcerative colitis Neg Hx     Celiac disease Neg Hx      Social History     Tobacco Use    Smoking status: Never Smoker    Smokeless tobacco: Never Used   Substance Use Topics    Alcohol use: No    Drug use: No     Review of Systems   Constitutional: Positive for fatigue and fever.   HENT: Positive for rhinorrhea. Negative for sore throat.    Respiratory: Positive for cough and shortness of breath (only when walking).    Cardiovascular: Negative for chest pain.   Gastrointestinal: Positive for abdominal pain, diarrhea and nausea.   Genitourinary: Positive for dysuria.   Musculoskeletal:        Positive for bilateral legs feeling like "jelly" when walking   All other systems reviewed and are negative.      Physical Exam     Initial Vitals [04/27/19 2048]   BP Pulse Resp Temp SpO2   123/60 82 16 98.8 °F (37.1 °C) --      MAP       --         Physical Exam    Nursing note and vitals reviewed.  Constitutional: She appears well-developed.   HENT:   Head: Normocephalic and atraumatic.   Nose: No mucosal edema or rhinorrhea. Right sinus exhibits no maxillary sinus tenderness and no frontal sinus tenderness. Left sinus exhibits no maxillary sinus tenderness and no frontal sinus tenderness.   Mouth/Throat: Mucous membranes are dry.   Negative for sinus congestion.   Eyes: EOM are normal. Pupils are equal, round, and reactive to light.   Neck: Neck supple.   Cardiovascular: Normal rate, regular rhythm and intact distal pulses.   Murmur (  systolic murmur that is heard loudest at left lower border) heard.  Pulmonary/Chest: Breath sounds normal. She has no wheezes. She has " no rhonchi. She has no rales.   Abdominal: Soft. Bowel sounds are normal. There is no tenderness.   Skin: Skin is warm and dry.   Scar at rt pelvis that is dry, clean, and intact.   Psychiatric: She has a normal mood and affect.         ED Course   Procedures  Labs Reviewed - No data to display       Imaging Results    None       X-Rays:   Independently Interpreted Readings:   Chest X-Ray: Mild blunting of costophrenic left angle concerning for mild infriltrate  No signs of edema or pneumothorax.     Medical Decision Making:   Patient is a transplant patient and had a fever at home.  SheHas had a mild cough for a day and half.  Chest x-ray appears to have blunting of the left costal diaphragmatic angle, but I do not believe this is an infiltrate.  She has no evidence of urinary tract infection.  She likely has a viral respiratory infection with a mild fever today.  I gave her very specific return precautions.  She does have small amount of thrush on her tongue and will start her on nystatin.    1:03 AM patient did have a temperature of a 100.0° here.  She would like to go home.  I feel that she is stable to go home at this time.  I instructed her to take Tylenol.  Return precautions were given for worsening symptoms.  She needs to call primary care physician and her transplant coordinator on Monday.            Scribe Attestation:   Scribe #1: I performed the above scribed service and the documentation accurately describes the services I performed. I attest to the accuracy of the note.    I, Dr. Yonny Lopez personally performed the services described in this documentation. All medical record entries made by the scribe were at my direction and in my presence.  I have reviewed the chart and agree that the record reflects my personal performance and is accurate and complete. Yonny Lopez MD.  1:04 AM 04/28/2019    DISCLAIMER: This note was prepared with Dragon NaturallySpeaking voice recognition transcription  software. Garbled syntax, mangled pronouns, and other bizarre constructions may be attributed to that software system            Clinical Impression:       ICD-10-CM ICD-9-CM   1. Fever in adult R50.9 780.60   2. Viral upper respiratory infection J06.9 465.9   3. Thrush B37.0 112.0                                Yonny Lopez MD  04/28/19 0104

## 2019-04-28 NOTE — ED NOTES
Patient taking her own Prograf and one of three unknown medications that she is taking as part of her participation in a transplant study program.

## 2019-04-28 NOTE — DISCHARGE INSTRUCTIONS
I think her fever is from a viral upper respiratory infection.    You just started a cough 2 days ago and had a fever today.  It is okay to take Tylenol.  If you're having worsening symptoms please return to the ER.  If you start having burning when urinate abdominal pain vomiting nausea or any other concerning symptoms return to the ER.  Continue all of her other medications

## 2019-04-29 ENCOUNTER — LAB VISIT (OUTPATIENT)
Dept: LAB | Facility: HOSPITAL | Age: 59
End: 2019-04-29
Attending: INTERNAL MEDICINE
Payer: MEDICARE

## 2019-04-29 ENCOUNTER — PATIENT MESSAGE (OUTPATIENT)
Dept: TRANSPLANT | Facility: CLINIC | Age: 59
End: 2019-04-29

## 2019-04-29 ENCOUNTER — TELEPHONE (OUTPATIENT)
Dept: INFECTIOUS DISEASES | Facility: CLINIC | Age: 59
End: 2019-04-29

## 2019-04-29 DIAGNOSIS — Z94.0 KIDNEY REPLACED BY TRANSPLANT: ICD-10-CM

## 2019-04-29 LAB
ALBUMIN SERPL BCP-MCNC: 3.5 G/DL (ref 3.5–5.2)
ANION GAP SERPL CALC-SCNC: 9 MMOL/L (ref 8–16)
BASOPHILS # BLD AUTO: 0.01 K/UL (ref 0–0.2)
BASOPHILS NFR BLD: 0.3 % (ref 0–1.9)
BUN SERPL-MCNC: 14 MG/DL (ref 8–23)
CALCIUM SERPL-MCNC: 9.3 MG/DL (ref 8.7–10.5)
CHLORIDE SERPL-SCNC: 112 MMOL/L (ref 95–110)
CO2 SERPL-SCNC: 24 MMOL/L (ref 23–29)
CREAT SERPL-MCNC: 1.1 MG/DL (ref 0.5–1.4)
DIFFERENTIAL METHOD: ABNORMAL
EOSINOPHIL # BLD AUTO: 0.1 K/UL (ref 0–0.5)
EOSINOPHIL NFR BLD: 2 % (ref 0–8)
ERYTHROCYTE [DISTWIDTH] IN BLOOD BY AUTOMATED COUNT: 15.9 % (ref 11.5–14.5)
EST. GFR  (AFRICAN AMERICAN): >60 ML/MIN/1.73 M^2
EST. GFR  (NON AFRICAN AMERICAN): 54.3 ML/MIN/1.73 M^2
GLUCOSE SERPL-MCNC: 178 MG/DL (ref 70–110)
HCT VFR BLD AUTO: 25.2 % (ref 37–48.5)
HGB BLD-MCNC: 8 G/DL (ref 12–16)
IMM GRANULOCYTES # BLD AUTO: 0.12 K/UL (ref 0–0.04)
IMM GRANULOCYTES NFR BLD AUTO: 3.9 % (ref 0–0.5)
LYMPHOCYTES # BLD AUTO: 0.4 K/UL (ref 1–4.8)
LYMPHOCYTES NFR BLD: 12.1 % (ref 18–48)
MAGNESIUM SERPL-MCNC: 1.5 MG/DL (ref 1.6–2.6)
MCH RBC QN AUTO: 29.3 PG (ref 27–31)
MCHC RBC AUTO-ENTMCNC: 31.7 G/DL (ref 32–36)
MCV RBC AUTO: 92 FL (ref 82–98)
MONOCYTES # BLD AUTO: 0.3 K/UL (ref 0.3–1)
MONOCYTES NFR BLD: 10.2 % (ref 4–15)
NEUTROPHILS # BLD AUTO: 2.2 K/UL (ref 1.8–7.7)
NEUTROPHILS NFR BLD: 71.5 % (ref 38–73)
NRBC BLD-RTO: 0 /100 WBC
PHOSPHATE SERPL-MCNC: 3.7 MG/DL (ref 2.7–4.5)
PLATELET # BLD AUTO: 198 K/UL (ref 150–350)
PMV BLD AUTO: 10.9 FL (ref 9.2–12.9)
POTASSIUM SERPL-SCNC: 4.3 MMOL/L (ref 3.5–5.1)
RBC # BLD AUTO: 2.73 M/UL (ref 4–5.4)
SODIUM SERPL-SCNC: 145 MMOL/L (ref 136–145)
WBC # BLD AUTO: 3.05 K/UL (ref 3.9–12.7)

## 2019-04-29 PROCEDURE — 83735 ASSAY OF MAGNESIUM: CPT

## 2019-04-29 PROCEDURE — 80069 RENAL FUNCTION PANEL: CPT

## 2019-04-29 PROCEDURE — 36415 COLL VENOUS BLD VENIPUNCTURE: CPT | Mod: PO

## 2019-04-29 PROCEDURE — 80197 ASSAY OF TACROLIMUS: CPT

## 2019-04-29 PROCEDURE — 85025 COMPLETE CBC W/AUTO DIFF WBC: CPT

## 2019-04-29 NOTE — TELEPHONE ENCOUNTER
----- Message from Cammie Kessler MD sent at 4/29/2019  3:53 PM CDT -----  Contact: Any / Partner  [phone]  Okay to see next Tuesday PM - okay to open clinic that PM as not in [facility]    ----- Message -----  From: Calista Helms MA  Sent: 4/29/2019   3:06 PM  To: Cammie Kessler MD    Spoke with patient's partner and she stated patient went Saturday for fever. She hasn't had fever since Saturday night but would like to come in soon to be seen. Please advise. Yours soonest available is 05/17  ----- Message -----  From: Cammie Kessler MD  Sent: 4/29/2019   2:48 PM  To: Calista Helms MA    Please let them know she needs to go to the emergency room if she is having fevers.    ----- Message -----  From: Calista Helms MA  Sent: 4/29/2019   9:22 AM  To: Cammie Kessler MD    First available for you is 05/17 please advise  ----- Message -----  From: Any Willard  Sent: 4/29/2019   9:14 AM  To: Victoria Bonds Staff    PT called for Ed fu appointment. She has been having a fever for several days.

## 2019-04-30 ENCOUNTER — OFFICE VISIT (OUTPATIENT)
Dept: FAMILY MEDICINE | Facility: CLINIC | Age: 59
End: 2019-04-30
Payer: MEDICARE

## 2019-04-30 VITALS
HEART RATE: 80 BPM | HEIGHT: 67 IN | SYSTOLIC BLOOD PRESSURE: 100 MMHG | DIASTOLIC BLOOD PRESSURE: 58 MMHG | TEMPERATURE: 99 F | WEIGHT: 205.38 LBS | OXYGEN SATURATION: 98 % | RESPIRATION RATE: 14 BRPM | BODY MASS INDEX: 32.24 KG/M2

## 2019-04-30 DIAGNOSIS — D63.1 ANEMIA IN STAGE 3 CHRONIC KIDNEY DISEASE: ICD-10-CM

## 2019-04-30 DIAGNOSIS — N18.30 ANEMIA IN STAGE 3 CHRONIC KIDNEY DISEASE: ICD-10-CM

## 2019-04-30 DIAGNOSIS — B37.0 ORAL THRUSH: Primary | ICD-10-CM

## 2019-04-30 DIAGNOSIS — Z94.0 KIDNEY REPLACED BY TRANSPLANT: ICD-10-CM

## 2019-04-30 DIAGNOSIS — Z23 NEED FOR PNEUMOCOCCAL VACCINE: ICD-10-CM

## 2019-04-30 LAB
CMV DNA SERPL NAA+PROBE-ACNC: NORMAL IU/ML
TACROLIMUS BLD-MCNC: 7.8 NG/ML (ref 5–15)

## 2019-04-30 PROCEDURE — G0009 ADMIN PNEUMOCOCCAL VACCINE: HCPCS | Mod: ,,, | Performed by: NURSE PRACTITIONER

## 2019-04-30 PROCEDURE — 90732 PNEUMOCOCCAL POLYSACCHARIDE VACCINE 23-VALENT =>2YO SQ IM: ICD-10-PCS | Mod: ,,, | Performed by: NURSE PRACTITIONER

## 2019-04-30 PROCEDURE — G0009 PNEUMOCOCCAL POLYSACCHARIDE VACCINE 23-VALENT =>2YO SQ IM: ICD-10-PCS | Mod: ,,, | Performed by: NURSE PRACTITIONER

## 2019-04-30 PROCEDURE — 90732 PPSV23 VACC 2 YRS+ SUBQ/IM: CPT | Mod: ,,, | Performed by: NURSE PRACTITIONER

## 2019-04-30 PROCEDURE — 99214 PR OFFICE/OUTPT VISIT, EST, LEVL IV, 30-39 MIN: ICD-10-PCS | Mod: ,,, | Performed by: NURSE PRACTITIONER

## 2019-04-30 PROCEDURE — 99214 OFFICE O/P EST MOD 30 MIN: CPT | Mod: ,,, | Performed by: NURSE PRACTITIONER

## 2019-04-30 RX ORDER — ACETAMINOPHEN AND PHENYLEPHRINE HCL 325; 5 MG/1; MG/1
10000 TABLET ORAL DAILY
COMMUNITY
End: 2019-07-29

## 2019-04-30 RX ORDER — CLOTRIMAZOLE 10 MG/1
10 LOZENGE ORAL; TOPICAL 4 TIMES DAILY
Qty: 40 TABLET | Refills: 0 | Status: SHIPPED | OUTPATIENT
Start: 2019-04-30 | End: 2019-04-30

## 2019-04-30 NOTE — PATIENT INSTRUCTIONS
Candida Infection: Thrush  Thrush is a type of fungal infection in the mouth and throat. Thrush does not usually affect healthy adults. It is more common in people with a weakened immune system. It is also more likely if you take antibiotics. Thrush is normally not contagious.  Understanding fungus in the mouth and throat  Your mouth and throat normally contain millions of tiny organisms. These include bacteria and yeasts. Many of these do not cause any problems. In fact, they may help fight disease.  Yeasts are a type of fungus. A type of yeast called Candida normally lives on your skin. It is also found on the membranes of your mouth and throat. Usually, this yeast grows only in small amounts and is harmless. But in some cases, Candida can grow out of control and cause thrush. Thrush is related to other kinds of Candida infections that can grow all over the body. Thrush refers to an infection of only the mouth and throat.  What causes thrush?  Thrush happens when something lets too much Candida grow inside your mouth and throat. Certain things that change the normal balance of organisms in the mouth can lead to thrush. One example is antibiotic medicine. This medicine may kill some of the normal bacteria in your mouth. Candida can then grow freely. People on antibiotics have an increased risk for thrush.  You have a higher risk for thrush if you:  · Wear dentures  · Are getting chemotherapy  · Are getting radiation therapy  · Have diabetes  · Have a transplanted organ  · Use corticosteroids  · Have a weakened immune system, such as from AIDS  · Are an older adult  Symptoms of thrush  Some people with thrush do not have any symptoms. Symptoms of thrush can include:  · A dry, cottony feeling in your mouth  · Loss of taste  · Pain while eating or swallowing  · White or red patches inside your mouth or on the back of your throat  Diagnosing thrush  Your health care provider will ask about your medical history and  your symptoms. He or she will look closely at your mouth and throat. White or red patches will be scraped with a tongue depressor. The sample will be sent to a lab to test. This test can usually confirm thrush.  If you have thrush, you may also have esophageal candidiasis. This is common in people who have HIV. Your health care provider may check for this condition with an upper endoscopy. This is a procedure to look at the esophagus. A tissue sample may be taken to test.  Treatment for thrush  It is important to treat thrush early. Untreated, it may turn into a more serious form of widespread infection. Thrush is usually treated with antifungal medicine. The medicine is put directly in your mouth and throat. You may be given a swish and swallow medicine or an antifungal lozenge.  In some cases, you may need an antifungal pill. This can remove Candida throughout your body. Or you may need medicine through an IV. These treatments depend on how severe your infection is, and what other health conditions you have.  If you are at high risk for thrush, you may need to keep taking oral antifungal medicine. This is to help prevent thrush in the future.  What happens if you dont get treated for thrush?  If untreated, the Candida may spread throughout your body. They may even enter your bloodstream. This can cause serious problems, such as organ failure and even death. Bloodstream infection may need to be treated with high doses of antifungal medicine through an IV.  Systemic infection is much more likely in people who are very ill. It is also more common in those who have serious problems with their immune system. Additional risk factors for systemic infection in very ill people include:  · Central venous lines  · IV nutrition  · Broad-spectrum antibiotics  · Kidney failure  · Recent surgery  Preventing thrush  You may be able to help prevent some cases of thrush. Make sure to:  · Practice good oral hygiene. Try using a  chlorhexidine mouthwash.  · Clean your dentures regularly as instructed. Make sure they fit you correctly.  · After using a corticosteroid inhaler, rinse out your mouth with water or mouthwash.  · Do not use broad-spectrum antibiotics, if possible.  · Get treated for health problems that increase your risk for thrush, such as diabetes.     When to call the health care provider  Call your health care provider right away if you have any of these:  · Cottony feeling in your mouth  · Loss of taste  · Pain while eating or swallowing  · White or red patches inside your mouth or on the back of your throat   Date Last Reviewed: 3/19/2015  © 8637-3856 [employer]. [address] [place] [address], [place] [address]. All rights reserved. This information is not intended as a substitute for professional medical care. Always follow your healthcare professional's instructions.

## 2019-04-30 NOTE — PROGRESS NOTES
SUBJECTIVE:      Patient ID: Andra Newell is a 59 y.o. female.    Chief Complaint: Follow-up    C/o ongoing anemia since kidney transplant in January, states she was recently released from [facility] Transplant Team (Dr. Mercado), told to reestablish with PCP, told her RBCs have been low - 3 transfusions & procrit shot since transplant, has been in hospital 3x since surgery & also had 3 UTIs s/p transplant, she is following with Dr. Del Castillo (ID at [facility]), sees Dr. Webber 5/2019 for endo follow-up    Has been feeling fatigued, loss of appetite, running fever intermittently that resolves without any treatment (99.5-101.5 over weekend, same thing yesterday up to 101.7, within a few hours back to normal), states she is currently in a research trial & has notified them of her symptoms but was told it wasn't trial related  Fatigue   This is a recurrent problem. The current episode started more than 1 month ago. The problem occurs daily. The problem has been waxing and waning. Associated symptoms include anorexia (reduced appetite), fatigue and a fever (intermittent). Pertinent negatives include no abdominal pain, arthralgias, chest pain, congestion, coughing, joint swelling, myalgias, nausea, neck pain, numbness, sore throat, vomiting or weakness. She has tried rest and sleep for the symptoms. The treatment provided mild relief.       Past Surgical History:   Procedure Laterality Date    ACHILLES TENDON SURGERY Left May 2015    ARTHROSCOPY, HIP Left 10/3/2013    Performed by Moe Mel[facility]dez MD at [facility] [place] OR    ARTHROSCOPY-HIP WITH TROCHANTERIC BURSECTOMY Left 10/3/2013    Performed by Moe Mel[facility]dez MD at [facility] [place] OR    ARTHROSCOPY-SHOULDER Left 11/24/2015    Performed by Moe [facility]dez [facility] at [facility] [place] OR    ARTHROSCOPY-SHOULDER WITH SUBACROMIAL DECOMPRESSION Left 7/12/2016    Performed by Moe Mel[facility]dez MD at [facility] [place] OR    BACK SURGERY      CHOLECYSTECTOMY      COLONOSCOPY N/A 9/6/2017    Performed by Marisol VIERA  MD [place] [facility]O    DEBRIDEMENT-SHOULDER-ARTHROSCOPIC Left 7/12/2016    [facility] at [facility] OR    DEBRIDEMENT-SHOULDER-ARTHROSCOPIC; LABRAL Left 11/24/2015    [facility] at [facility] OR    DIALYSIS FISTULA CREATION  12/2017    FEMOROPLASTY, ARTHROSCOPIC Left 10/3/2013    [facility] at [facility] OR    HEMORRHOID SURGERY      INJECTION-AMNIOX Left 7/12/2016    [facility] at [facility] OR    JOINT REPLACEMENT      left    KNEE SURGERY      LYSIS-ADHESION Left 11/24/2015    [facility] at [facility] OR    REPAIR ROTATOR CUFF ARTHROSCOPIC Left 7/12/2016    [facility] at [facility] OR    REPAIR-TENDON-BICEP Left 11/24/2015    [facility] at [facility] OR    SHOULDER ARTHROSCOPY      SHOULDER SURGERY      TRANSPLANT, KIDNEY N/A 1/14/2019    [facility]    UPPER GASTROINTESTINAL ENDOSCOPY  ~2013    Dr. Weineror     Family History   Problem Relation Age of Onset    Diabetes Mother     Alzheimer's disease Mother     Thyroid disease Mother     Hyperlipidemia Mother     Heart disease Father     Stroke Father     Diabetes Sister     Lupus Sister     Ovarian cancer Sister     Heart disease Brother     No Known Problems Son     Diabetes Maternal Grandmother     Hypertension Maternal Grandmother     No Known Problems Paternal Grandmother     No Known Problems Paternal Grandfather     COPD Maternal Aunt     Diabetes Maternal Aunt     Heart disease Maternal Aunt     Hypertension Maternal Aunt     No Known Problems Maternal Uncle     No Known Problems Paternal Aunt     No Known Problems Paternal Uncle     Colon cancer Neg Hx     Colon polyps Neg Hx     Crohn's disease Neg Hx     Ulcerative colitis Neg Hx     Celiac disease Neg Hx       Social History     Socioeconomic History    Marital status: Significant Other     Spouse name: Not on file     Number of children: 2    Years of education: Not on file    Highest education level: Not on file   Occupational History    Occupation: retired     Comment:    Social Needs    Financial resource strain: Not on file    Food insecurity:     Worry: Not on file     Inability: Not on file    Transportation needs:     Medical: Not on file     Non-medical: Not on file   Tobacco Use    Smoking status: Never Smoker    Smokeless tobacco: Never Used   Substance and Sexual Activity    Alcohol use: No    Drug use: No    Sexual activity: Never   Lifestyle    Physical activity:     Days per week: Not on file     Minutes per session: Not on file    Stress: Not on file   Relationships    Social connections:     Talks on phone: Not on file     Gets together: Not on file     Attends [employer] service: Not on file     Active member of club or organization: Not on file     Attends meetings of clubs or organizations: Not on file     Relationship status: Not on file   Other Topics Concern    Not on file   Social History Narrative    Not on file     No current facility-administered medications for this visit.      Current Outpatient Medications   Medication Sig Dispense Refill    biotin 10,000 mcg Cap Take 50,000 mcg by mouth once daily.      ergocalciferol (ERGOCALCIFEROL) 50,000 unit Cap Take 1 capsule (50,000 Units total) by mouth every 7 days. Take on Tues 4 capsule 2    estradiol (ESTRACE) 0.01 % (0.1 mg/gram) vaginal cream Place 0.5 g vaginally twice a week. Apply to the vagina daily for two weeks then twice a week. 45 g 4    famotidine (PEPCID) 20 MG tablet Take 1 tablet (20 mg total) by mouth 2 (two) times daily. 60 tablet 11    flash glucose scanning reader (FREESTYLE DAKOTAH 14 DAY READER) Misc Dispense 1 reader 1 each 0    flash glucose sensor (FREESTYLE DAKOTAH 14 DAY SENSOR) Kit Uses 1 kit monthly 5 kit 3    insulin (BASAGLAR KWIKPEN U-100 INSULIN) glargine 100 units/mL (3mL) SubQ pen  "Inject 30 Units into the skin every evening. 15 mL 0    insulin aspart U-100 (NOVOLOG FLEXPEN U-100 INSULIN) 100 unit/mL InPn pen Novolog 10 u AC + correction Max TDD 40u 15 mL 12    levothyroxine (SYNTHROID) 75 MCG tablet TAKE ONE TABLET BY MOUTH ONCE DAILY 30 tablet 4    magnesium oxide (MAG-OX) 400 mg (241.3 mg magnesium) tablet Take 2 tablets (800 mg total) by mouth once daily. 60 tablet 11    multivitamin (THERAGRAN) tablet Take 1 tablet by mouth once daily.      oxybutynin (DITROPAN-XL) 10 MG 24 hr tablet Take 1 tablet (10 mg total) by mouth once daily. 30 tablet 11    pen needle, diabetic (BD ULTRA-FINE MINI PEN NEEDLE) 31 gauge x 3/16" Ndle USES 4  DAILY 400 each 5    rosuvastatin (CRESTOR) 10 MG tablet Take 1 tablet (10 mg total) by mouth once daily. 90 tablet 1    tacrolimus (PROGRAF) 0.5 MG Cap Take 2 capsules (1 mg total) by mouth every 12 (twelve) hours. 120 capsule 11    VIIBRYD 40 mg Tab tablet TAKE ONE TABLET BY MOUTH ONCE DAILY 30 tablet 4    LORazepam (ATIVAN) 2 MG Tab TAKE ONE TABLET BY MOUTH TWICE DAILY 60 tablet 0     Review of patient's allergies indicates:   Allergen Reactions    Torsemide Diarrhea     Diarrhea stopped once discontinued med    Codeine Itching     Can take oxycodone and hydrocodone with Benadryl      Past Medical History:   Diagnosis Date    Anemia     Anemia in chronic kidney disease, on chronic dialysis 7/12/2017    Arthritis     Asthma     allergic airway    CKD stage III (moderate) 2/18/2019    Colon polyp     Depression     Diabetes mellitus     Diverticulosis     Eosinophilia     ESRD (end stage renal disease)     stage V, due for living donor 9/2018    ESRD on dialysis     GERD (gastroesophageal reflux disease)     Gout     Gout     Hyperlipidemia     Hypertension     Low back pain     MRSA carrier     Obesity     Renal disorder     Rotator cuff syndrome--left     Thyroid disease     Ulnar neuropathy of right upper extremity     " Uncontrolled type 2 diabetes mellitus with hyperglycemia      Past Surgical History:   Procedure Laterality Date    ACHILLES TENDON SURGERY Left May 2015    ARTHROSCOPY, HIP Left 10/3/2013    [facility]    ARTHROSCOPY-HIP WITH TROCHANTERIC BURSECTOMY Left 10/3/2013    [facility] at [facility] OR    ARTHROSCOPY-SHOULDER Left 11/24/2015    [facility] at [facility]    ARTHROSCOPY-SHOULDER WITH SUBACROMIAL DECOMPRESSION Left 7/12/2016    Per[facility] by [facility] at [facility] OR    BACK SURGERY      CHOLECYSTECTOMY      COLONOSCOPY N/A 9/6/2017    [facility]formed by [facility]son [facility] at [facility] ENDO    DEBRIDEMENT-SHOULDER-ARTHROSCOPIC Left 7/12/2016    [facility] at [facility] OR    DEBRIDEMENT-SHOULDER-ARTHROSCOPIC; LABRAL Left 11/24/2015    [facility] at [facility] OR    DIALYSIS FISTULA CREATION  12/2017    FEMOROPLASTY, ARTHROSCOPIC Left 10/3/2013    [facility] at [facility]    HEMORRHOID SURGERY      INJECTION-AMNIOX Left 7/12/2016    [facility] at [facility]    JOINT REPLACEMENT      left    KNEE SURGERY      LYSIS-ADHESION Left 11/24/2015    [facility] at [facility]    REPAIR ROTATOR CUFF ARTHROSCOPIC Left 7/12/2016    [facility] at [facility]    REPAIR-TENDON-BICEP Left 11/24/2015    [facility] at [facility]    SHOULDER ARTHROSCOPY      SHOULDER SURGERY      TRANSPLANT, KIDNEY N/A 1/14/2019    Performed by [facility] at [facility] OR [address] [place] [address]    UPPER GASTROINTESTINAL ENDOSCOPY  ~2013     [facility]       Review of Systems   Constitutional: Positive for appetite change (reduced), fatigue and fever (intermittent). Negative for activity change and unexpected weight change.   HENT: Negative for congestion, ear pain, hearing loss, postnasal drip, sinus pressure, sinus pain, sneezing and sore throat.    Eyes:  "Negative for photophobia and pain.   Respiratory: Negative for cough, chest tightness, shortness of breath and wheezing.    Cardiovascular: Negative for chest pain, palpitations and leg swelling.   Gastrointestinal: Positive for anorexia (reduced appetite). Negative for abdominal distention, abdominal pain, blood in stool, constipation, diarrhea, nausea and vomiting.   Endocrine: Negative for cold intolerance, heat intolerance, polydipsia and polyuria.   Genitourinary: Negative for difficulty urinating, dysuria, flank pain, frequency, hematuria, pelvic pain and urgency.   Musculoskeletal: Negative for arthralgias, back pain, joint swelling, myalgias and neck pain.   Skin: Negative for pallor.   Allergic/Immunologic: Negative for environmental allergies and food allergies.   Neurological: Negative for dizziness, weakness, light-headedness and numbness.   Hematological: Does not bruise/bleed easily.   Psychiatric/Behavioral: Negative for agitation, confusion, decreased concentration and sleep disturbance. The patient is not nervous/anxious.       OBJECTIVE:      Vitals:    04/30/19 0746   BP: (!) 100/58   Pulse: 80   Resp: 14   Temp: 98.5 °F (36.9 °C)   SpO2: 98%   Weight: 93.2 kg (205 lb 6.4 oz)   Height: 5' 7" (1.702 m)     Physical Exam   Constitutional: She is oriented to person, place, and time. Vital signs are normal. She appears well-developed and well-nourished. No distress.   obese   HENT:   Head: Normocephalic and atraumatic.   Right Ear: Hearing normal.   Left Ear: Hearing normal.   Nose: Nose normal. No rhinorrhea.   Mouth/Throat: Uvula is midline and mucous membranes are normal.   Thick white coating - tongue   Eyes: Pupils are equal, round, and reactive to light. Conjunctivae and lids are normal. Right eye exhibits no discharge. Left eye exhibits no discharge. Right conjunctiva is not injected. Left conjunctiva is not injected. Right pupil is round and reactive. Left pupil is round and reactive. Pupils " are equal.   Neck: Trachea normal and normal range of motion. Neck supple. No JVD present. No tracheal deviation present. No thyromegaly present.   Cardiovascular: Normal rate, regular rhythm, normal heart sounds and intact distal pulses. Exam reveals no gallop and no friction rub.   No murmur heard.  Pulses:       Radial pulses are 2+ on the right side, and 2+ on the left side.   Pulmonary/Chest: Effort normal and breath sounds normal. No stridor. No respiratory distress. She has no decreased breath sounds. She has no wheezes. She has no rhonchi. She has no rales.   Abdominal: Soft. Bowel sounds are normal. She exhibits no distension. There is no tenderness. There is no rigidity and no guarding.   Musculoskeletal: Normal range of motion. She exhibits no edema.   2 keloids to lower abd/pelvis scar   Lymphadenopathy:     She has no cervical adenopathy.   Neurological: She is alert and oriented to person, place, and time. She has normal strength. She displays no atrophy. She displays a negative Romberg sign. Coordination and gait normal.   Skin: Skin is warm and dry. Capillary refill takes less than 2 seconds. No lesion and no rash noted. No cyanosis. There is pallor.   Psychiatric: She has a normal mood and affect. Her speech is normal and behavior is normal. Judgment and thought content normal. Cognition and memory are normal. She is attentive.   Nursing note and vitals reviewed.     Assessment:       1. Oral thrush    2. Anemia in stage 3 chronic kidney disease    3. Kidney replaced by transplant    4. Uncontrolled type 2 diabetes mellitus, with long-term current use of insulin    5. Need for pneumococcal vaccine        Plan:       Oral thrush  -     clotrimazole (MYCELEX) 10 mg davon; Take 1 tablet (10 mg total) by mouth 4 (four) times daily. for 10 days  Dispense: 40 tablet; Refill: 0    Anemia in stage 3 chronic kidney disease  -     Ambulatory referral to Hematology / Oncology    Kidney replaced by  transplant  -     Ambulatory referral to Hematology / Oncology    Uncontrolled type 2 diabetes mellitus, with long-term current use of insulin   - following with endo    Need for pneumococcal vaccine  -     Pneumococcal Polysaccharide Vaccine (23 Valent) (SQ/IM)        Follow up in about 1 month (around 5/28/2019) for anemia recheck (WITH GRAVES).      5/6/2019 Quang Longo, APRN, FNP-C

## 2019-05-03 LAB
BACTERIA BLD CULT: NORMAL
BACTERIA BLD CULT: NORMAL

## 2019-05-05 ENCOUNTER — PATIENT MESSAGE (OUTPATIENT)
Dept: FAMILY MEDICINE | Facility: CLINIC | Age: 59
End: 2019-05-05

## 2019-05-05 DIAGNOSIS — N18.6 ESRD ON DIALYSIS: ICD-10-CM

## 2019-05-05 DIAGNOSIS — Z99.2 ESRD ON DIALYSIS: ICD-10-CM

## 2019-05-05 RX ORDER — LORAZEPAM 2 MG/1
TABLET ORAL
Qty: 60 TABLET | Refills: 0 | Status: SHIPPED | OUTPATIENT
Start: 2019-05-05 | End: 2019-05-06 | Stop reason: CLARIF

## 2019-05-06 ENCOUNTER — TELEPHONE (OUTPATIENT)
Dept: TRANSPLANT | Facility: CLINIC | Age: 59
End: 2019-05-06

## 2019-05-06 ENCOUNTER — HOSPITAL ENCOUNTER (EMERGENCY)
Facility: HOSPITAL | Age: 59
Discharge: OTHER INSTITUTION | End: 2019-05-06
Attending: EMERGENCY MEDICINE
Payer: MEDICARE

## 2019-05-06 VITALS
TEMPERATURE: 99 F | RESPIRATION RATE: 16 BRPM | HEIGHT: 67 IN | WEIGHT: 205 LBS | BODY MASS INDEX: 32.18 KG/M2 | DIASTOLIC BLOOD PRESSURE: 69 MMHG | HEART RATE: 67 BPM | OXYGEN SATURATION: 100 % | SYSTOLIC BLOOD PRESSURE: 153 MMHG

## 2019-05-06 DIAGNOSIS — A41.9 SEPSIS: ICD-10-CM

## 2019-05-06 DIAGNOSIS — R50.9 FEVER, UNSPECIFIED FEVER CAUSE: Primary | ICD-10-CM

## 2019-05-06 DIAGNOSIS — D64.9 ANEMIA, UNSPECIFIED TYPE: ICD-10-CM

## 2019-05-06 PROBLEM — D63.8 ANEMIA OF CHRONIC DISEASE: Status: ACTIVE | Noted: 2017-07-12

## 2019-05-06 PROBLEM — B37.0 THRUSH: Status: ACTIVE | Noted: 2019-05-06

## 2019-05-06 LAB
ALBUMIN SERPL BCP-MCNC: 3.7 G/DL (ref 3.5–5.2)
ALP SERPL-CCNC: 195 U/L (ref 55–135)
ALT SERPL W/O P-5'-P-CCNC: 60 U/L (ref 10–44)
ANION GAP SERPL CALC-SCNC: 11 MMOL/L (ref 8–16)
AST SERPL-CCNC: 60 U/L (ref 10–40)
BASOPHILS # BLD AUTO: 0 K/UL (ref 0–0.2)
BASOPHILS NFR BLD: 0.1 % (ref 0–1.9)
BILIRUB SERPL-MCNC: 0.6 MG/DL (ref 0.1–1)
BILIRUB UR QL STRIP: NEGATIVE
BUN SERPL-MCNC: 13 MG/DL (ref 8–23)
CALCIUM SERPL-MCNC: 9 MG/DL (ref 8.7–10.5)
CHLORIDE SERPL-SCNC: 106 MMOL/L (ref 95–110)
CLARITY UR: CLEAR
CO2 SERPL-SCNC: 21 MMOL/L (ref 23–29)
COLOR UR: YELLOW
CREAT SERPL-MCNC: 1.3 MG/DL (ref 0.5–1.4)
DIFFERENTIAL METHOD: ABNORMAL
EOSINOPHIL # BLD AUTO: 0.1 K/UL (ref 0–0.5)
EOSINOPHIL NFR BLD: 2.2 % (ref 0–8)
ERYTHROCYTE [DISTWIDTH] IN BLOOD BY AUTOMATED COUNT: 18.2 % (ref 11.5–14.5)
EST. GFR  (AFRICAN AMERICAN): 51 ML/MIN/1.73 M^2
EST. GFR  (NON AFRICAN AMERICAN): 44 ML/MIN/1.73 M^2
GLUCOSE SERPL-MCNC: 187 MG/DL (ref 70–110)
GLUCOSE UR QL STRIP: NEGATIVE
HCT VFR BLD AUTO: 21 % (ref 37–48.5)
HGB BLD-MCNC: 6.9 G/DL (ref 12–16)
HGB UR QL STRIP: NEGATIVE
KETONES UR QL STRIP: NEGATIVE
LACTATE SERPL-SCNC: 1.5 MMOL/L (ref 0.5–2.2)
LACTATE SERPL-SCNC: 1.9 MMOL/L (ref 0.5–2.2)
LEUKOCYTE ESTERASE UR QL STRIP: NEGATIVE
LYMPHOCYTES # BLD AUTO: 0.2 K/UL (ref 1–4.8)
LYMPHOCYTES NFR BLD: 3 % (ref 18–48)
MCH RBC QN AUTO: 28.9 PG (ref 27–31)
MCHC RBC AUTO-ENTMCNC: 32.9 G/DL (ref 32–36)
MCV RBC AUTO: 88 FL (ref 82–98)
MONOCYTES # BLD AUTO: 0.3 K/UL (ref 0.3–1)
MONOCYTES NFR BLD: 5.4 % (ref 4–15)
NEUTROPHILS # BLD AUTO: 4.6 K/UL (ref 1.8–7.7)
NEUTROPHILS NFR BLD: 89.3 % (ref 38–73)
NITRITE UR QL STRIP: NEGATIVE
PH UR STRIP: 6 [PH] (ref 5–8)
PLATELET # BLD AUTO: 179 K/UL (ref 150–350)
PMV BLD AUTO: 7.8 FL (ref 9.2–12.9)
POCT GLUCOSE: 161 MG/DL (ref 70–110)
POCT GLUCOSE: 175 MG/DL (ref 70–110)
POTASSIUM SERPL-SCNC: 4.2 MMOL/L (ref 3.5–5.1)
PROT SERPL-MCNC: 6.6 G/DL (ref 6–8.4)
PROT UR QL STRIP: NEGATIVE
RBC # BLD AUTO: 2.39 M/UL (ref 4–5.4)
SODIUM SERPL-SCNC: 138 MMOL/L (ref 136–145)
SP GR UR STRIP: <=1.005 (ref 1–1.03)
URN SPEC COLLECT METH UR: ABNORMAL
UROBILINOGEN UR STRIP-ACNC: NEGATIVE EU/DL
WBC # BLD AUTO: 5.2 K/UL (ref 3.9–12.7)

## 2019-05-06 PROCEDURE — 81003 URINALYSIS AUTO W/O SCOPE: CPT

## 2019-05-06 PROCEDURE — 80053 COMPREHEN METABOLIC PANEL: CPT

## 2019-05-06 PROCEDURE — 96372 THER/PROPH/DIAG INJ SC/IM: CPT | Mod: 59

## 2019-05-06 PROCEDURE — 87040 BLOOD CULTURE FOR BACTERIA: CPT | Mod: 59

## 2019-05-06 PROCEDURE — 96374 THER/PROPH/DIAG INJ IV PUSH: CPT

## 2019-05-06 PROCEDURE — 82962 GLUCOSE BLOOD TEST: CPT | Mod: 91

## 2019-05-06 PROCEDURE — 36415 COLL VENOUS BLD VENIPUNCTURE: CPT

## 2019-05-06 PROCEDURE — S5571 INSULIN DISPOS PEN 3 ML: HCPCS | Performed by: EMERGENCY MEDICINE

## 2019-05-06 PROCEDURE — 83605 ASSAY OF LACTIC ACID: CPT | Mod: 91

## 2019-05-06 PROCEDURE — 93005 ELECTROCARDIOGRAM TRACING: CPT

## 2019-05-06 PROCEDURE — 63600175 PHARM REV CODE 636 W HCPCS: Performed by: EMERGENCY MEDICINE

## 2019-05-06 PROCEDURE — 85025 COMPLETE CBC W/AUTO DIFF WBC: CPT

## 2019-05-06 PROCEDURE — 25000003 PHARM REV CODE 250: Performed by: EMERGENCY MEDICINE

## 2019-05-06 PROCEDURE — 96361 HYDRATE IV INFUSION ADD-ON: CPT

## 2019-05-06 PROCEDURE — 99285 EMERGENCY DEPT VISIT HI MDM: CPT | Mod: 25

## 2019-05-06 RX ORDER — LORAZEPAM 2 MG/1
1 TABLET ORAL 2 TIMES DAILY PRN
COMMUNITY
End: 2019-05-31 | Stop reason: SDUPTHER

## 2019-05-06 RX ORDER — ONDANSETRON 2 MG/ML
4 INJECTION INTRAMUSCULAR; INTRAVENOUS
Status: COMPLETED | OUTPATIENT
Start: 2019-05-06 | End: 2019-05-06

## 2019-05-06 RX ORDER — ACETAMINOPHEN 325 MG/1
650 TABLET ORAL
Status: COMPLETED | OUTPATIENT
Start: 2019-05-06 | End: 2019-05-06

## 2019-05-06 RX ADMIN — INSULIN DETEMIR 30 UNITS: 100 INJECTION, SOLUTION SUBCUTANEOUS at 11:05

## 2019-05-06 RX ADMIN — SODIUM CHLORIDE 1000 ML: 0.9 INJECTION, SOLUTION INTRAVENOUS at 04:05

## 2019-05-06 RX ADMIN — ACETAMINOPHEN 650 MG: 325 TABLET ORAL at 06:05

## 2019-05-06 RX ADMIN — ONDANSETRON 4 MG: 2 INJECTION INTRAMUSCULAR; INTRAVENOUS at 04:05

## 2019-05-06 NOTE — ED NOTES
Patient is resting in bed with significant other at the bedside. No needs or questions at this time.

## 2019-05-06 NOTE — ED NOTES
Report received from Any Thurston RN. Pt is AAOx4. Resting comfortably. VSS. Cardiac monitor and continuous pulse ox in place. VSS. Bed in lowest, locked position with call light within reach. Pt denies needs. Friend at bedside.

## 2019-05-06 NOTE — ED NOTES
Pt here with complaints of waking up this morning with acute onset of vomiting and fever. None of these problems when went to bed. Prior to going to bed pt took a norco for lower back pain which is chronic for pt. Pt also took a benadryl with the pain med. Pt is a recent kidney transplant pt. Pt complains of feeling weak which is also chronic for her. Reviewed MD's orders with pt. No questions at this time. Noted sats at 85%. Pt placed on nasal cannula at 3 lpm and sats up to 95%. Pt alert and oriented with neuro intact.

## 2019-05-06 NOTE — ED PROVIDER NOTES
Encounter Date: 5/6/2019       History   No chief complaint on file.    HPI patient is a 59-year-old woman status post renal transplant in January this year that presents to the ER complaining of intermittent fever for several weeks.  Patient had a temperature 102° F tonight when she woke up vomiting.  Denies any diarrhea, dysuria, cough, headache, neck stiffness. Patient's sister reports that the patient has been extremely weak for several weeks and worsening.  Recently seen in the emergency department approximately 1.5 weeks ago and diagnosed with URI and thrush which has been improving on a statin  Review of patient's allergies indicates:   Allergen Reactions    Torsemide Diarrhea     Diarrhea stopped once discontinued med    Codeine Itching     Can take oxycodone and hydrocodone with Benadryl     Past Medical History:   Diagnosis Date    Anemia     Anemia in chronic kidney disease, on chronic dialysis 7/12/2017    Arthritis     Asthma     allergic airway    CKD stage III (moderate) 2/18/2019    Colon polyp     Depression     Diabetes mellitus     Diverticulosis     Eosinophilia     ESRD (end stage renal disease)     stage V, due for living donor 9/2018    ESRD on dialysis     GERD (gastroesophageal reflux disease)     Gout     Gout     Hyperlipidemia     Hypertension     Low back pain     MRSA carrier     Obesity     Renal disorder     Rotator cuff syndrome--left     Thyroid disease     Ulnar neuropathy of right upper extremity     Uncontrolled type 2 diabetes mellitus with hyperglycemia      Past Surgical History:   Procedure Laterality Date    ACHILLES TENDON SURGERY Left May 2015    ARTHROSCOPY, HIP Left 10/3/2013    [facility]    ARTHROSCOPY-HIP WITH TROCHANTERIC BURSECTOMY Left 10/3/2013    [facility]    ARTHROSCOPY-SHOULDER Left 11/24/2015    [facility]    ARTHROSCOPY-SHOULDER  WITH SUBACROMIAL DECOMPRESSION Left 7/12/2016    [facility]    BACK SURGERY      CHOLECYSTECTOMY      COLONOSCOPY N/A 9/6/2017    [facility]    DEBRIDEMENT-SHOULDER-ARTHROSCOPIC Left 7/12/2016    [facility]    DEBRIDEMENT-SHOULDER-ARTHROSCOPIC; LABRAL Left 11/24/2015    [facility] OR    DIALYSIS FISTULA CREATION  12/2017    FEMOROPLASTY, ARTHROSCOPIC Left 10/3/2013    [facility] OR    HEMORRHOID SURGERY      INJECTION-AMNIOX Left 7/12/2016    [facility] OR    JOINT REPLACEMENT      left    KNEE SURGERY      LYSIS-ADHESION Left 11/24/2015    [facility]    REPAIR ROTATOR CUFF ARTHROSCOPIC Left 7/12/2016    [facility]    REPAIR-TENDON-BICEP Left 11/24/2015    [facility] OR    SHOULDER ARTHROSCOPY      SHOULDER SURGERY      TRANSPLANT, KIDNEY N/A 1/14/2019    [facility]formed [facility] OR 2ND [facility]    UPPER GASTROINTESTINAL ENDOSCOPY  ~2013     [facility]irt     Family History   Problem Relation Age of Onset    Diabetes Mother     Alzheimer's disease Mother     Thyroid disease Mother     Hyperlipidemia Mother     Heart disease Father     Stroke Father     Diabetes Sister     Lupus Sister     Ovarian cancer Sister     Heart disease Brother     No Known Problems Son     Diabetes Maternal Grandmother     Hypertension Maternal Grandmother     No Known Problems Paternal Grandmother     No Known Problems Paternal Grandfather     COPD Maternal Aunt     Diabetes Maternal Aunt     Heart disease Maternal Aunt     Hypertension Maternal Aunt     No Known Problems Maternal Uncle     No Known Problems Paternal Aunt     No Known Problems Paternal Uncle     Colon cancer Neg Hx     Colon polyps Neg Hx     Crohn's  disease Neg Hx     Ulcerative colitis Neg Hx     Celiac disease Neg Hx      Social History     Tobacco Use    Smoking status: Never Smoker    Smokeless tobacco: Never Used   Substance Use Topics    Alcohol use: No    Drug use: No     Review of Systems  REVIEW OF SYSTEMS  CONSTITUTIONAL:  Positive for fever, generalized weakness  HEENT:  Negative for sore throat.   HEART:   Negative for chest pain..  LUNG:  Negative for shortness of breath.  ABDOMEN:  Negative for nausea.   :  No discharge, dysuria  EXTREMITIES:  No swelling  NEURO:  Negative for weakness.   SKIN:  Negative for rash. Positive for pallor  Psych: No depression  HEME: Does not bruise/bleed easily.           Physical Exam     Initial Vitals [05/06/19 0333]   BP Pulse Resp Temp SpO2   130/68 (!) 116 16 (!) 101.5 °F (38.6 °C) 96 %      MAP       --         Physical Exam  Physical Exam   Nurses notes and vitals reviewed.  Constitutional:Oriented to person, place, and time.  Appears generally weak, pale.  HEENT: PERRL, EOMI, Conjunctivae are normal. Moist mucous membranes. Normocephalic. Atraumatic, no acute, traumatic or infectious process noted.  Neck: Normal range of motion, supple, no JVD.  Cardiovascular: Normal rate, regular rhythm, systolic ejection cardiac murmur noted and intact distal pulses.   Pulmonary/Chest: Effort normal and breath sounds normal. No respiratory distress. No wheezes,  rales or ronchi.   Abdominal: Soft, no distension. There is no tenderness, rebound or gaurding.  No Chase's, Rovsing's, or McBurney's point tenderness.  No CVA tenderness.   Musculoskeletal: Moves all extremities well.  Full range of motion.  5/5 strength.  No sensory deficits.  No C/C/E.  2+ pulses throughout  Neurological: Alert and oriented to person, place, and time. Cranial nerves II through XII are grossly intact without anyfocal motor, sensory, and cerebellar findings.  Skin: Skin is warm and dry, no rashes.  Psych: Full affect, cooperative      ED  Course   Procedures  Labs Reviewed   CBC W/ AUTO DIFFERENTIAL - Abnormal; Notable for the following components:       Result Value    RBC 2.39 (*)     Hemoglobin 6.9 (*)     Hematocrit 21.0 (*)     RDW 18.2 (*)     MPV 7.8 (*)     Lymph # 0.2 (*)     Gran% 89.3 (*)     Lymph% 3.0 (*)     All other components within normal limits   COMPREHENSIVE METABOLIC PANEL - Abnormal; Notable for the following components:    CO2 21 (*)     Glucose 187 (*)     Alkaline Phosphatase 195 (*)     AST 60 (*)     ALT 60 (*)     eGFR if  51 (*)     eGFR if non  44 (*)     All other components within normal limits   URINALYSIS, REFLEX TO URINE CULTURE - Abnormal; Notable for the following components:    Specific Gravity, UA <=1.005 (*)     All other components within normal limits    Narrative:     Preferred Collection Type->Urine, Clean Catch   POCT GLUCOSE - Abnormal; Notable for the following components:    POCT Glucose 175 (*)     All other components within normal limits   CULTURE, BLOOD    Narrative:     Aerobic and anaerobic   CULTURE, BLOOD    Narrative:     Aerobic and anaerobic   LACTIC ACID, PLASMA   LACTIC ACID, PLASMA   POCT GLUCOSE MONITORING CONTINUOUS        ECG Results          EKG 12-lead (In process)  Result time 05/06/19 14:16:18    In process by Interface, Lab In [facility] (05/06/19 14:16:18)                 Narrative:    Test Reason : A41.9,    Vent. Rate : 102 BPM     Atrial Rate : 102 BPM     P-R Int : 168 ms          QRS Dur : 090 ms      QT Int : 348 ms       P-R-T Axes : 067 -40 059 degrees     QTc Int : 453 ms    Sinus tachycardia with occasional Premature ventricular complexes  Left axis deviation  Abnormal ECG  When compared with ECG of 11-APR-2019 17:47,  Premature ventricular complexes are now Present  Vent. rate has increased BY  35 BPM    Referred By: [email or web address]   SELF           Confirmed By:                   In process by Interface, Lab In [facility] (05/06/19 11:46:04)                  Narrative:    Test Reason : A41.9,    Vent. Rate : 102 BPM     Atrial Rate : 102 BPM     P-R Int : 168 ms          QRS Dur : 090 ms      QT Int : 348 ms       P-R-T Axes : 067 -40 059 degrees     QTc Int : 453 ms    Sinus tachycardia with occasional Premature ventricular complexes  Left axis deviation  Abnormal ECG  When compared with ECG of 11-APR-2019 17:47,  Premature ventricular complexes are now Present  Vent. rate has increased BY  35 BPM    Referred By: [email or web address]   SELF           Confirmed By:                                  Imaging Results          X-Ray Chest AP Portable (Final result)  Result time 05/06/19 08:58:43    Final result by Bogdan Manjarrez Jr., MD (05/06/19 08:58:43)                 Impression:      Borderline heart size otherwise negative portable chest.      Electronically signed by: Bogdan Manjarrez MD  Date:    05/06/2019  Time:    08:58             Narrative:    EXAMINATION:  XR CHEST AP PORTABLE    CLINICAL HISTORY:  Sepsis;    TECHNIQUE:  Single frontal view of the chest was performed.    COMPARISON:  Prior chest of April 27, 2018.    FINDINGS:  Heart size is borderline.  No intrapulmonary masses or infiltrates are seen.  No pneumothorax or pleural effusion is noted.                             EKG:.  Sinus tachycardia with occasional PVCs.  1-2 beats per minute.  Left axis deviation.  Normal ST segments.     Medical Decision Making:   History:   Old Medical Records: I decided to obtain old medical records.  Initial Assessment:   Patient is 59-year-old woman who presents emergency department with complaints of unexplained fevers, nausea.  Patient is status post renal transplant in January and on immunosuppressive therapy.  She is found to be febrile on initial evaluation.  Septic workup initiated.  No suspected source or source able to be determined on ER evaluation.  White blood cell count is normal at 5.2.  Care turned over to Dr. Schwartz pending urinalysis and consult to  renal transplant given recent transplant status.  Patient will likely need to be transfer to [facility].                      Clinical Impression:       ICD-10-CM ICD-9-CM   1. Fever, unspecified fever cause R50.9 780.60   2. Sepsis A41.9 038.9     995.91   3. Anemia, unspecified type D64.9 285.9                                Jeronimo Angulo MD  05/06/19 0110

## 2019-05-06 NOTE — TELEPHONE ENCOUNTER
Incoming call from patient spouse mary stating patient is presently admitted to [facility] with plan transfer to [facility].

## 2019-05-06 NOTE — ED NOTES
Per the transfer center they are still waiting for bed to become available. They will let us know when one is ready. Pt updated. Pt denies needs. Pt reports that she took her morning meds from home.

## 2019-05-06 NOTE — ED NOTES
Food services is aware that patient needs a dinner tray. Family and patient have been updated that [facility] does not have a bed at this time. No other needs or questions from patient or family.

## 2019-05-06 NOTE — ED NOTES
Patient is resting in bed with eyes closed, chest rise is symmetrical, respirations are regular and unlabored. FIFI SANTOYO

## 2019-05-06 NOTE — ED NOTES
Patient is sitting up in bed, eating lunch with family at the bedside. No needs or questions at this time.

## 2019-05-07 ENCOUNTER — RESEARCH ENCOUNTER (OUTPATIENT)
Dept: RESEARCH | Facility: CLINIC | Age: 59
End: 2019-05-07

## 2019-05-07 ENCOUNTER — HOSPITAL ENCOUNTER (INPATIENT)
Facility: HOSPITAL | Age: 59
LOS: 3 days | Discharge: HOME | DRG: 866 | End: 2019-05-10
Attending: TRANSPLANT SURGERY | Admitting: TRANSPLANT SURGERY
Payer: MEDICARE

## 2019-05-07 DIAGNOSIS — D70.9 NEUTROPENIA, UNSPECIFIED TYPE: ICD-10-CM

## 2019-05-07 DIAGNOSIS — R50.9 FEVER: ICD-10-CM

## 2019-05-07 DIAGNOSIS — D63.8 ANEMIA OF CHRONIC DISEASE: ICD-10-CM

## 2019-05-07 DIAGNOSIS — R19.7 DIARRHEA, UNSPECIFIED TYPE: Primary | ICD-10-CM

## 2019-05-07 DIAGNOSIS — Z29.89 PROPHYLACTIC IMMUNOTHERAPY: ICD-10-CM

## 2019-05-07 DIAGNOSIS — K21.9 GASTROESOPHAGEAL REFLUX DISEASE WITHOUT ESOPHAGITIS: ICD-10-CM

## 2019-05-07 DIAGNOSIS — B34.3 PARVOVIRUS B19 INFECTION: ICD-10-CM

## 2019-05-07 DIAGNOSIS — Z94.0 STATUS POST LIVING-DONOR KIDNEY TRANSPLANTATION: ICD-10-CM

## 2019-05-07 DIAGNOSIS — Z91.89 AT RISK FOR OPPORTUNISTIC INFECTIONS: ICD-10-CM

## 2019-05-07 DIAGNOSIS — R50.9 FEVER, UNSPECIFIED FEVER CAUSE: ICD-10-CM

## 2019-05-07 DIAGNOSIS — E78.2 MIXED HYPERLIPIDEMIA: Chronic | ICD-10-CM

## 2019-05-07 DIAGNOSIS — D64.9 ACUTE ON CHRONIC ANEMIA: ICD-10-CM

## 2019-05-07 DIAGNOSIS — Z79.60 LONG-TERM USE OF IMMUNOSUPPRESSANT MEDICATION: ICD-10-CM

## 2019-05-07 LAB
ABO + RH BLD: NORMAL
ALBUMIN SERPL BCP-MCNC: 3.2 G/DL (ref 3.5–5.2)
ALBUMIN SERPL BCP-MCNC: 3.2 G/DL (ref 3.5–5.2)
ALP SERPL-CCNC: 161 U/L (ref 55–135)
ALT SERPL W/O P-5'-P-CCNC: 46 U/L (ref 10–44)
ANION GAP SERPL CALC-SCNC: 8 MMOL/L (ref 8–16)
ANION GAP SERPL CALC-SCNC: 8 MMOL/L (ref 8–16)
ANISOCYTOSIS BLD QL SMEAR: SLIGHT
ANISOCYTOSIS BLD QL SMEAR: SLIGHT
AST SERPL-CCNC: 42 U/L (ref 10–40)
BASOPHILS NFR BLD: 0 % (ref 0–1.9)
BASOPHILS NFR BLD: 1 % (ref 0–1.9)
BILIRUB SERPL-MCNC: 0.5 MG/DL (ref 0.1–1)
BILIRUB UR QL STRIP: NEGATIVE
BLD GP AB SCN CELLS X3 SERPL QL: NORMAL
BLD PROD TYP BPU: NORMAL
BLD PROD TYP BPU: NORMAL
BLOOD UNIT EXPIRATION DATE: NORMAL
BLOOD UNIT EXPIRATION DATE: NORMAL
BLOOD UNIT TYPE CODE: 5100
BLOOD UNIT TYPE CODE: 5100
BLOOD UNIT TYPE: NORMAL
BLOOD UNIT TYPE: NORMAL
BUN SERPL-MCNC: 13 MG/DL (ref 8–23)
BUN SERPL-MCNC: 14 MG/DL (ref 8–23)
C DIFF GDH STL QL: POSITIVE
C DIFF TOX A+B STL QL IA: NEGATIVE
C DIFF TOX GENS STL QL NAA+PROBE: NEGATIVE
CALCIUM SERPL-MCNC: 8.8 MG/DL (ref 8.7–10.5)
CALCIUM SERPL-MCNC: 8.9 MG/DL (ref 8.7–10.5)
CHLORIDE SERPL-SCNC: 109 MMOL/L (ref 95–110)
CHLORIDE SERPL-SCNC: 109 MMOL/L (ref 95–110)
CLARITY UR REFRACT.AUTO: ABNORMAL
CO2 SERPL-SCNC: 24 MMOL/L (ref 23–29)
CO2 SERPL-SCNC: 25 MMOL/L (ref 23–29)
CODING SYSTEM: NORMAL
CODING SYSTEM: NORMAL
COLOR UR AUTO: YELLOW
CREAT SERPL-MCNC: 1.1 MG/DL (ref 0.5–1.4)
CREAT SERPL-MCNC: 1.1 MG/DL (ref 0.5–1.4)
DIFFERENTIAL METHOD: ABNORMAL
DIFFERENTIAL METHOD: ABNORMAL
DISPENSE STATUS: NORMAL
DISPENSE STATUS: NORMAL
EOSINOPHIL NFR BLD: 2 % (ref 0–8)
EOSINOPHIL NFR BLD: 3 % (ref 0–8)
ERYTHROCYTE [DISTWIDTH] IN BLOOD BY AUTOMATED COUNT: 15.9 % (ref 11.5–14.5)
ERYTHROCYTE [DISTWIDTH] IN BLOOD BY AUTOMATED COUNT: 16.2 % (ref 11.5–14.5)
EST. GFR  (AFRICAN AMERICAN): >60 ML/MIN/1.73 M^2
EST. GFR  (AFRICAN AMERICAN): >60 ML/MIN/1.73 M^2
EST. GFR  (NON AFRICAN AMERICAN): 54.3 ML/MIN/1.73 M^2
EST. GFR  (NON AFRICAN AMERICAN): 54.3 ML/MIN/1.73 M^2
ESTIMATED AVG GLUCOSE: 209 MG/DL (ref 68–131)
ESTIMATED AVG GLUCOSE: 209 MG/DL (ref 68–131)
FERRITIN SERPL-MCNC: 525 NG/ML (ref 20–300)
FOLATE SERPL-MCNC: 13.1 NG/ML (ref 4–24)
GLUCOSE SERPL-MCNC: 130 MG/DL (ref 70–110)
GLUCOSE SERPL-MCNC: 156 MG/DL (ref 70–110)
GLUCOSE UR QL STRIP: NEGATIVE
HAPTOGLOB SERPL-MCNC: 120 MG/DL (ref 30–250)
HAV IGM SERPL QL IA: NEGATIVE
HBA1C MFR BLD HPLC: 8.9 % (ref 4–5.6)
HBA1C MFR BLD HPLC: 8.9 % (ref 4–5.6)
HBV CORE IGM SERPL QL IA: NEGATIVE
HBV SURFACE AG SERPL QL IA: NEGATIVE
HCT VFR BLD AUTO: 20.4 % (ref 37–48.5)
HCT VFR BLD AUTO: 23.4 % (ref 37–48.5)
HCV AB SERPL QL IA: NEGATIVE
HGB BLD-MCNC: 6.4 G/DL (ref 12–16)
HGB BLD-MCNC: 7.6 G/DL (ref 12–16)
HGB UR QL STRIP: NEGATIVE
HYPOCHROMIA BLD QL SMEAR: ABNORMAL
HYPOCHROMIA BLD QL SMEAR: ABNORMAL
IMM GRANULOCYTES # BLD AUTO: ABNORMAL K/UL (ref 0–0.04)
IMM GRANULOCYTES # BLD AUTO: ABNORMAL K/UL (ref 0–0.04)
IMM GRANULOCYTES NFR BLD AUTO: ABNORMAL % (ref 0–0.5)
IMM GRANULOCYTES NFR BLD AUTO: ABNORMAL % (ref 0–0.5)
INR PPP: 1.1 (ref 0.8–1.2)
IRON SERPL-MCNC: 59 UG/DL (ref 30–160)
KETONES UR QL STRIP: NEGATIVE
LDH SERPL L TO P-CCNC: 274 U/L (ref 110–260)
LEUKOCYTE ESTERASE UR QL STRIP: NEGATIVE
LYMPHOCYTES NFR BLD: 16 % (ref 18–48)
LYMPHOCYTES NFR BLD: 7 % (ref 18–48)
MAGNESIUM SERPL-MCNC: 1.5 MG/DL (ref 1.6–2.6)
MCH RBC QN AUTO: 29.5 PG (ref 27–31)
MCH RBC QN AUTO: 29.5 PG (ref 27–31)
MCHC RBC AUTO-ENTMCNC: 31.4 G/DL (ref 32–36)
MCHC RBC AUTO-ENTMCNC: 32.5 G/DL (ref 32–36)
MCV RBC AUTO: 91 FL (ref 82–98)
MCV RBC AUTO: 94 FL (ref 82–98)
METAMYELOCYTES NFR BLD MANUAL: 1 %
MONOCYTES NFR BLD: 10 % (ref 4–15)
MONOCYTES NFR BLD: 8 % (ref 4–15)
MYELOCYTES NFR BLD MANUAL: 2 %
NEUTROPHILS NFR BLD: 66 % (ref 38–73)
NEUTROPHILS NFR BLD: 78 % (ref 38–73)
NEUTS BAND NFR BLD MANUAL: 6 %
NITRITE UR QL STRIP: NEGATIVE
NRBC BLD-RTO: 0 /100 WBC
NRBC BLD-RTO: 1 /100 WBC
NUM UNITS TRANS PACKED RBC: NORMAL
NUM UNITS TRANS PACKED RBC: NORMAL
OVALOCYTES BLD QL SMEAR: ABNORMAL
OVALOCYTES BLD QL SMEAR: ABNORMAL
PH UR STRIP: 5 [PH] (ref 5–8)
PHOSPHATE SERPL-MCNC: 4.3 MG/DL (ref 2.7–4.5)
PLATELET # BLD AUTO: 150 K/UL (ref 150–350)
PLATELET # BLD AUTO: 160 K/UL (ref 150–350)
PLATELET BLD QL SMEAR: ABNORMAL
PLATELET BLD QL SMEAR: ABNORMAL
PMV BLD AUTO: 10.3 FL (ref 9.2–12.9)
PMV BLD AUTO: 10.6 FL (ref 9.2–12.9)
POCT GLUCOSE: 141 MG/DL (ref 70–110)
POCT GLUCOSE: 157 MG/DL (ref 70–110)
POCT GLUCOSE: 171 MG/DL (ref 70–110)
POCT GLUCOSE: 183 MG/DL (ref 70–110)
POIKILOCYTOSIS BLD QL SMEAR: SLIGHT
POIKILOCYTOSIS BLD QL SMEAR: SLIGHT
POLYCHROMASIA BLD QL SMEAR: ABNORMAL
POTASSIUM SERPL-SCNC: 4.2 MMOL/L (ref 3.5–5.1)
POTASSIUM SERPL-SCNC: 4.7 MMOL/L (ref 3.5–5.1)
PROT SERPL-MCNC: 6.1 G/DL (ref 6–8.4)
PROT UR QL STRIP: NEGATIVE
PROTHROMBIN TIME: 10.9 SEC (ref 9–12.5)
RBC # BLD AUTO: 2.17 M/UL (ref 4–5.4)
RBC # BLD AUTO: 2.58 M/UL (ref 4–5.4)
RETICS/RBC NFR AUTO: 1 % (ref 0.5–2.5)
SATURATED IRON: 24 % (ref 20–50)
SODIUM SERPL-SCNC: 141 MMOL/L (ref 136–145)
SODIUM SERPL-SCNC: 142 MMOL/L (ref 136–145)
SP GR UR STRIP: 1.01 (ref 1–1.03)
TACROLIMUS BLD-MCNC: 9.2 NG/ML (ref 5–15)
TOTAL IRON BINDING CAPACITY: 241 UG/DL (ref 250–450)
TRANSFERRIN SERPL-MCNC: 163 MG/DL (ref 200–375)
URN SPEC COLLECT METH UR: ABNORMAL
VIT B12 SERPL-MCNC: 434 PG/ML (ref 210–950)
WBC # BLD AUTO: 2.66 K/UL (ref 3.9–12.7)
WBC # BLD AUTO: 2.82 K/UL (ref 3.9–12.7)
WBC #/AREA STL HPF: NORMAL /[HPF]

## 2019-05-07 PROCEDURE — 25500020 PHARM REV CODE 255: Performed by: STUDENT IN AN ORGANIZED HEALTH CARE EDUCATION/TRAINING PROGRAM

## 2019-05-07 PROCEDURE — 99223 PR INITIAL HOSPITAL CARE,LEVL III: ICD-10-PCS | Mod: GC,,, | Performed by: INTERNAL MEDICINE

## 2019-05-07 PROCEDURE — 36415 COLL VENOUS BLD VENIPUNCTURE: CPT

## 2019-05-07 PROCEDURE — 36430 TRANSFUSION BLD/BLD COMPNT: CPT

## 2019-05-07 PROCEDURE — 85045 AUTOMATED RETICULOCYTE COUNT: CPT

## 2019-05-07 PROCEDURE — 80197 ASSAY OF TACROLIMUS: CPT

## 2019-05-07 PROCEDURE — 87798 DETECT AGENT NOS DNA AMP: CPT | Mod: 59

## 2019-05-07 PROCEDURE — 87799 DETECT AGENT NOS DNA QUANT: CPT | Mod: 91

## 2019-05-07 PROCEDURE — 82728 ASSAY OF FERRITIN: CPT

## 2019-05-07 PROCEDURE — 83036 HEMOGLOBIN GLYCOSYLATED A1C: CPT

## 2019-05-07 PROCEDURE — 87209 SMEAR COMPLEX STAIN: CPT

## 2019-05-07 PROCEDURE — 87086 URINE CULTURE/COLONY COUNT: CPT

## 2019-05-07 PROCEDURE — 25000003 PHARM REV CODE 250: Performed by: NURSE PRACTITIONER

## 2019-05-07 PROCEDURE — 80074 ACUTE HEPATITIS PANEL: CPT

## 2019-05-07 PROCEDURE — 85027 COMPLETE CBC AUTOMATED: CPT

## 2019-05-07 PROCEDURE — 20600001 HC STEP DOWN PRIVATE ROOM

## 2019-05-07 PROCEDURE — 81003 URINALYSIS AUTO W/O SCOPE: CPT

## 2019-05-07 PROCEDURE — 25000003 PHARM REV CODE 250: Performed by: PHYSICIAN ASSISTANT

## 2019-05-07 PROCEDURE — 83735 ASSAY OF MAGNESIUM: CPT

## 2019-05-07 PROCEDURE — 82746 ASSAY OF FOLIC ACID SERUM: CPT

## 2019-05-07 PROCEDURE — 99233 PR SUBSEQUENT HOSPITAL CARE,LEVL III: ICD-10-PCS | Mod: ,,, | Performed by: NURSE PRACTITIONER

## 2019-05-07 PROCEDURE — 89055 LEUKOCYTE ASSESSMENT FECAL: CPT

## 2019-05-07 PROCEDURE — 83615 LACTATE (LD) (LDH) ENZYME: CPT

## 2019-05-07 PROCEDURE — 85610 PROTHROMBIN TIME: CPT

## 2019-05-07 PROCEDURE — 63600175 PHARM REV CODE 636 W HCPCS: Performed by: NURSE PRACTITIONER

## 2019-05-07 PROCEDURE — 99233 SBSQ HOSP IP/OBS HIGH 50: CPT | Mod: ,,, | Performed by: NURSE PRACTITIONER

## 2019-05-07 PROCEDURE — 63600175 PHARM REV CODE 636 W HCPCS: Performed by: PHYSICIAN ASSISTANT

## 2019-05-07 PROCEDURE — 86920 COMPATIBILITY TEST SPIN: CPT

## 2019-05-07 PROCEDURE — 85007 BL SMEAR W/DIFF WBC COUNT: CPT

## 2019-05-07 PROCEDURE — 82607 VITAMIN B-12: CPT

## 2019-05-07 PROCEDURE — 80069 RENAL FUNCTION PANEL: CPT

## 2019-05-07 PROCEDURE — 99222 PR INITIAL HOSPITAL CARE,LEVL II: ICD-10-PCS | Mod: ,,, | Performed by: NURSE PRACTITIONER

## 2019-05-07 PROCEDURE — 99223 1ST HOSP IP/OBS HIGH 75: CPT | Mod: GC,,, | Performed by: INTERNAL MEDICINE

## 2019-05-07 PROCEDURE — 87329 GIARDIA AG IA: CPT

## 2019-05-07 PROCEDURE — 83010 ASSAY OF HAPTOGLOBIN QUANT: CPT

## 2019-05-07 PROCEDURE — 87493 C DIFF AMPLIFIED PROBE: CPT

## 2019-05-07 PROCEDURE — 86850 RBC ANTIBODY SCREEN: CPT

## 2019-05-07 PROCEDURE — 99222 1ST HOSP IP/OBS MODERATE 55: CPT | Mod: ,,, | Performed by: NURSE PRACTITIONER

## 2019-05-07 PROCEDURE — P9016 RBC LEUKOCYTES REDUCED: HCPCS

## 2019-05-07 PROCEDURE — 87425 ROTAVIRUS AG IA: CPT

## 2019-05-07 PROCEDURE — 87799 DETECT AGENT NOS DNA QUANT: CPT

## 2019-05-07 PROCEDURE — 87507 IADNA-DNA/RNA PROBE TQ 12-25: CPT

## 2019-05-07 PROCEDURE — 87449 NOS EACH ORGANISM AG IA: CPT

## 2019-05-07 PROCEDURE — S5571 INSULIN DISPOS PEN 3 ML: HCPCS | Performed by: NURSE PRACTITIONER

## 2019-05-07 PROCEDURE — 63600175 PHARM REV CODE 636 W HCPCS: Mod: JG | Performed by: INTERNAL MEDICINE

## 2019-05-07 PROCEDURE — 83540 ASSAY OF IRON: CPT

## 2019-05-07 PROCEDURE — 80053 COMPREHEN METABOLIC PANEL: CPT

## 2019-05-07 RX ORDER — LORAZEPAM 1 MG/1
1 TABLET ORAL 2 TIMES DAILY PRN
Status: DISCONTINUED | OUTPATIENT
Start: 2019-05-07 | End: 2019-05-10 | Stop reason: HOSPADM

## 2019-05-07 RX ORDER — DIPHENHYDRAMINE HCL 25 MG
25 CAPSULE ORAL ONCE
Status: COMPLETED | OUTPATIENT
Start: 2019-05-07 | End: 2019-05-07

## 2019-05-07 RX ORDER — ACETAMINOPHEN 325 MG/1
650 TABLET ORAL ONCE
Status: COMPLETED | OUTPATIENT
Start: 2019-05-07 | End: 2019-05-07

## 2019-05-07 RX ORDER — DIPHENHYDRAMINE HYDROCHLORIDE 50 MG/ML
25 INJECTION INTRAMUSCULAR; INTRAVENOUS EVERY 4 HOURS PRN
Status: DISCONTINUED | OUTPATIENT
Start: 2019-05-07 | End: 2019-05-10 | Stop reason: HOSPADM

## 2019-05-07 RX ORDER — HYDROCODONE BITARTRATE AND ACETAMINOPHEN 500; 5 MG/1; MG/1
TABLET ORAL
Status: DISCONTINUED | OUTPATIENT
Start: 2019-05-07 | End: 2019-05-07

## 2019-05-07 RX ORDER — VILAZODONE HYDROCHLORIDE 10 MG/1
40 TABLET ORAL DAILY
Status: DISCONTINUED | OUTPATIENT
Start: 2019-05-07 | End: 2019-05-10 | Stop reason: HOSPADM

## 2019-05-07 RX ORDER — ONDANSETRON 8 MG/1
8 TABLET, ORALLY DISINTEGRATING ORAL EVERY 8 HOURS PRN
Status: DISCONTINUED | OUTPATIENT
Start: 2019-05-07 | End: 2019-05-10 | Stop reason: HOSPADM

## 2019-05-07 RX ORDER — GLUCAGON 1 MG
1 KIT INJECTION
Status: DISCONTINUED | OUTPATIENT
Start: 2019-05-07 | End: 2019-05-07

## 2019-05-07 RX ORDER — INSULIN ASPART 100 [IU]/ML
10 INJECTION, SOLUTION INTRAVENOUS; SUBCUTANEOUS
Status: DISCONTINUED | OUTPATIENT
Start: 2019-05-07 | End: 2019-05-07

## 2019-05-07 RX ORDER — GLUCAGON 1 MG
1 KIT INJECTION
Status: DISCONTINUED | OUTPATIENT
Start: 2019-05-07 | End: 2019-05-10 | Stop reason: HOSPADM

## 2019-05-07 RX ORDER — ROSUVASTATIN CALCIUM 10 MG/1
10 TABLET, COATED ORAL DAILY
Status: DISCONTINUED | OUTPATIENT
Start: 2019-05-07 | End: 2019-05-10 | Stop reason: HOSPADM

## 2019-05-07 RX ORDER — ACETAMINOPHEN 325 MG/1
650 TABLET ORAL EVERY 8 HOURS PRN
Status: DISCONTINUED | OUTPATIENT
Start: 2019-05-07 | End: 2019-05-09

## 2019-05-07 RX ORDER — TACROLIMUS 1 MG/1
1 CAPSULE ORAL 2 TIMES DAILY
Status: DISCONTINUED | OUTPATIENT
Start: 2019-05-07 | End: 2019-05-10 | Stop reason: HOSPADM

## 2019-05-07 RX ORDER — HEPARIN SODIUM 5000 [USP'U]/ML
5000 INJECTION, SOLUTION INTRAVENOUS; SUBCUTANEOUS EVERY 8 HOURS
Status: DISCONTINUED | OUTPATIENT
Start: 2019-05-07 | End: 2019-05-10 | Stop reason: HOSPADM

## 2019-05-07 RX ORDER — FAMOTIDINE 20 MG/1
20 TABLET, FILM COATED ORAL NIGHTLY
Status: DISCONTINUED | OUTPATIENT
Start: 2019-05-07 | End: 2019-05-10

## 2019-05-07 RX ORDER — MAG HYDROX/ALUMINUM HYD/SIMETH 200-200-20
30 SUSPENSION, ORAL (FINAL DOSE FORM) ORAL EVERY 6 HOURS PRN
Status: DISCONTINUED | OUTPATIENT
Start: 2019-05-07 | End: 2019-05-10 | Stop reason: HOSPADM

## 2019-05-07 RX ORDER — IBUPROFEN 200 MG
16 TABLET ORAL
Status: DISCONTINUED | OUTPATIENT
Start: 2019-05-07 | End: 2019-05-07

## 2019-05-07 RX ORDER — SODIUM CHLORIDE 0.9 % (FLUSH) 0.9 %
10 SYRINGE (ML) INJECTION
Status: DISCONTINUED | OUTPATIENT
Start: 2019-05-07 | End: 2019-05-10 | Stop reason: HOSPADM

## 2019-05-07 RX ORDER — IBUPROFEN 200 MG
24 TABLET ORAL
Status: DISCONTINUED | OUTPATIENT
Start: 2019-05-07 | End: 2019-05-07

## 2019-05-07 RX ORDER — INSULIN ASPART 100 [IU]/ML
0-5 INJECTION, SOLUTION INTRAVENOUS; SUBCUTANEOUS
Status: DISCONTINUED | OUTPATIENT
Start: 2019-05-07 | End: 2019-05-07

## 2019-05-07 RX ORDER — HYDRALAZINE HYDROCHLORIDE 20 MG/ML
5 INJECTION INTRAMUSCULAR; INTRAVENOUS EVERY 6 HOURS PRN
Status: DISCONTINUED | OUTPATIENT
Start: 2019-05-07 | End: 2019-05-08

## 2019-05-07 RX ORDER — ERGOCALCIFEROL 1.25 MG/1
50000 CAPSULE ORAL
Status: DISCONTINUED | OUTPATIENT
Start: 2019-05-07 | End: 2019-05-10 | Stop reason: HOSPADM

## 2019-05-07 RX ORDER — IBUPROFEN 200 MG
16 TABLET ORAL
Status: DISCONTINUED | OUTPATIENT
Start: 2019-05-07 | End: 2019-05-10 | Stop reason: HOSPADM

## 2019-05-07 RX ORDER — LANOLIN ALCOHOL/MO/W.PET/CERES
800 CREAM (GRAM) TOPICAL DAILY
Status: DISCONTINUED | OUTPATIENT
Start: 2019-05-07 | End: 2019-05-10 | Stop reason: HOSPADM

## 2019-05-07 RX ORDER — IBUPROFEN 200 MG
24 TABLET ORAL
Status: DISCONTINUED | OUTPATIENT
Start: 2019-05-07 | End: 2019-05-10 | Stop reason: HOSPADM

## 2019-05-07 RX ORDER — INSULIN ASPART 100 [IU]/ML
1-10 INJECTION, SOLUTION INTRAVENOUS; SUBCUTANEOUS
Status: DISCONTINUED | OUTPATIENT
Start: 2019-05-07 | End: 2019-05-10 | Stop reason: HOSPADM

## 2019-05-07 RX ORDER — OXYBUTYNIN CHLORIDE 10 MG/1
10 TABLET, EXTENDED RELEASE ORAL DAILY
Status: DISCONTINUED | OUTPATIENT
Start: 2019-05-07 | End: 2019-05-10 | Stop reason: HOSPADM

## 2019-05-07 RX ORDER — INSULIN ASPART 100 [IU]/ML
5 INJECTION, SOLUTION INTRAVENOUS; SUBCUTANEOUS
Status: DISCONTINUED | OUTPATIENT
Start: 2019-05-07 | End: 2019-05-10 | Stop reason: HOSPADM

## 2019-05-07 RX ADMIN — ERGOCALCIFEROL 50000 UNITS: 1.25 CAPSULE ORAL at 09:05

## 2019-05-07 RX ADMIN — ALUMINUM HYDROXIDE, MAGNESIUM HYDROXIDE, AND SIMETHICONE 30 ML: 200; 200; 20 SUSPENSION ORAL at 09:05

## 2019-05-07 RX ADMIN — TACROLIMUS 1 MG: 1 CAPSULE ORAL at 04:05

## 2019-05-07 RX ADMIN — THERA TABS 1 TABLET: TAB at 09:05

## 2019-05-07 RX ADMIN — HEPARIN SODIUM 5000 UNITS: 5000 INJECTION, SOLUTION INTRAVENOUS; SUBCUTANEOUS at 05:05

## 2019-05-07 RX ADMIN — INSULIN ASPART 2 UNITS: 100 INJECTION, SOLUTION INTRAVENOUS; SUBCUTANEOUS at 04:05

## 2019-05-07 RX ADMIN — FAMOTIDINE 20 MG: 20 TABLET, FILM COATED ORAL at 09:05

## 2019-05-07 RX ADMIN — ROSUVASTATIN CALCIUM 10 MG: 10 TABLET, FILM COATED ORAL at 09:05

## 2019-05-07 RX ADMIN — ACETAMINOPHEN 650 MG: 325 TABLET ORAL at 02:05

## 2019-05-07 RX ADMIN — OXYBUTYNIN CHLORIDE 10 MG: 10 TABLET, EXTENDED RELEASE ORAL at 11:05

## 2019-05-07 RX ADMIN — MAGNESIUM OXIDE TAB 400 MG (241.3 MG ELEMENTAL MG) 800 MG: 400 (241.3 MG) TAB at 09:05

## 2019-05-07 RX ADMIN — IOHEXOL 15 ML: 350 INJECTION, SOLUTION INTRAVENOUS at 03:05

## 2019-05-07 RX ADMIN — HUMAN IMMUNOGLOBULIN G 95 G: 20 LIQUID INTRAVENOUS at 03:05

## 2019-05-07 RX ADMIN — LEVOTHYROXINE SODIUM 75 MCG: 25 TABLET ORAL at 09:05

## 2019-05-07 RX ADMIN — VILAZODONE HYDROCHLORIDE 40 MG: 10 TABLET ORAL at 12:05

## 2019-05-07 RX ADMIN — IOHEXOL 15 ML: 350 INJECTION, SOLUTION INTRAVENOUS at 04:05

## 2019-05-07 RX ADMIN — INSULIN ASPART 5 UNITS: 100 INJECTION, SOLUTION INTRAVENOUS; SUBCUTANEOUS at 12:05

## 2019-05-07 RX ADMIN — DIPHENHYDRAMINE HYDROCHLORIDE 25 MG: 25 CAPSULE ORAL at 02:05

## 2019-05-07 RX ADMIN — HEPARIN SODIUM 5000 UNITS: 5000 INJECTION, SOLUTION INTRAVENOUS; SUBCUTANEOUS at 09:05

## 2019-05-07 RX ADMIN — INSULIN DETEMIR 24 UNITS: 100 INJECTION, SOLUTION SUBCUTANEOUS at 10:05

## 2019-05-07 RX ADMIN — TACROLIMUS 1 MG: 1 CAPSULE ORAL at 09:05

## 2019-05-07 RX ADMIN — HYDRALAZINE HYDROCHLORIDE 5 MG: 20 INJECTION INTRAMUSCULAR; INTRAVENOUS at 09:05

## 2019-05-07 NOTE — PLAN OF CARE
Pt AAOx4, VSS, Tmax = 100.1. 1U PRBC currently transfusing; pt tolerating well. UA and urine cx sent. Fall risk precautions initiated.  Pt in lowest bed position setting, lighting adjusted, pt to wear nonskid socks when ambulating, side rails up x2.  Pt remain free from falls during shift. Caregiver at bedside. Call light within reach. Will continue to monitor.

## 2019-05-07 NOTE — PROGRESS NOTES
Admit Note     Met with patient and significant other, Any, to assess needs. Patient is a 59 y.o. female in committed relationship, admitted for Fever [R50.9] per medical record. Pt is s/p Kidney Transplant 1/14/19.    Patient admitted from ED on 5/7/2019.  At this time, patient presents as alert and oriented x 4, pleasant, good eye contact, calm, communicative, cooperative and asking and answering questions appropriately. At this time, patients caregiver presents as aaox4 with open affect and engaged in pt's care.    Household/Family Systems     Patient resides with patient's significant other, Any at     [address] [place] [id number]     Pt cell [phone]  Any, SO and primary caregiver, cell [phone].    Support system includes SO, two sons and friend Ashleigh.  Patient does not have dependents that are need of being cared for.    During admission, patient's caregiver plans to visit.  Confirmed patient and patients caregivers do have access to reliable transportation.    Cognitive Status/Learning     Patient reports reading ability as 12th grade and states patient does have difficulty with seeing and utilizes glasses .  Patient reports patient learns best by hands-on instruction .   Needed: No.   Highest education level: High School (9-12) or GED    Vocation/Disability   .  Working for Income: No  If no, reason not working: Disability  Patient is disabled due to work injury  since 2015.  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    Adherence     Patient reports a high level of adherence to patients health care regimen.  Adherence counseling and education provided. Patient verbalizes understanding.    Substance Use    Patient reports the following substance usage.    Tobacco: none, patient denies any use.  Alcohol: none, patient denies any use.  Illicit Drugs/Non-prescribed Medications: none, patient denies any use.    Patient states  clear understanding of the potential impact of substance use.  Substance abstinence/cessation counseling, education and resources provided and reviewed.     Services Utilizing/ADLS    Infusion Service: Prior to admission, patient utilizing? no. Pt did have [facility] for IV abx.  Home Health: Prior to admission, patient utilizing? yes, [facility] for labs, vitals, etc.  DME: Prior to admission, yes scooter for long distances, and a bath chair. Pt is requesting a rollater walker for her to use mainly in the home due to weakness and fatigue on exertion.  Pulmonary/Cardiac Rehab: Prior to admission, no  Dialysis:  Prior to admission, no   Transplant Specialty Pharmacy:  Prior to admission, no. Pt uses [facility] near their home.    Prior to admission, patient reports patient was not independent with ADLS and was not driving.  Patient reports patient is not able to care for self at this time due to compromised medical condition (as documented in medical record) and physical weakness..  Patient indicates a willingness to care for self once medically cleared to do so. Pt's SO currently provides assistance with getting in and out of tub and reports after bathing pt is exhausted.    Insurance/Medications    Insured by   Payor/Plan Subscr  Sex Relation Sub. Ins. ID Effective Group Num   1. [employer] * KVNG NUNEZ 1902 Female  [id number]                                    [address] [facility] [address]   2. [place] - [place]* VIRGIE DUKE * 1960 Female  [id number]                                    [address]      Primary Insurance (for UNOS reporting): Public Insurance - Medicare FFS (Fee For Service)  Secondary Insurance (for UNOS reporting): Public Insurance - Medicaid     Pt reports she no longer has Workers' Comp.    Patient reports patient is able to obtain and afford medications at this time and at time of discharge.    Living Will/Healthcare Power of      Patient states patient has a LW and/or HCPA and SO Any is HCPA.      Coping/Mental Health    Patient is coping adequately with the aid of  family members and friends.  Patient indicates mental health difficulties at this present time and reports she takes Vibryd and Ativan which help. Pt reports her counselor: Mague Muniz, is able to see pt in the home but pt has not yet asked her to do so.    From previous note: Pt does have a history of SI after work injury in 2015 and was hospitalized. Pt is currently followed by Dr. Sheth, psychiatrist. for depression. Pt utilizes Ativan and Vilbryd daily and reports as stable.      Discharge Planning    At time of discharge, patient plans to return to patient's home under the care of Any.  Patients significant other will transport patient.  Per rounds today, expected discharge date has not been medically determined at this time. Patient and patients caregiver verbalize understanding and are involved in treatment planning and discharge process.    Additional Concerns    Patient's caretaker denies additional needs and/or concerns at this time. Patient is being followed for needs, education, resources, information, emotional support, supportive counseling, and for supportive and skilled discharge plan of care.  providing ongoing psychosocial support, education, resources and d/c planning as needed.  SW remains available. Patient's caregiver verbalizes understanding and agreement with information reviewed,  availability and how to access available resources as needed. Patient denies additional needs and/or concerns at this time. Patient verbalizes understanding and agreement with information reviewed, social work availability, and how to access available resources as needed.

## 2019-05-07 NOTE — SUBJECTIVE & OBJECTIVE
Subjective:     Chief Complaint/Reason for Admission: Fever and anemia    History of Present Illness:  Ms. Newell is a 60 y/o F with PMHx of chronic back pain, anemia, and DM/HTN nephropathy ESRD now s/p living kidney transplant 1/14/2019 (Campath induction, CMV D+/R). Her post op course has been significant for multiple readmissions 2/2 fatigue, weakness, BOYER, and recurrent ESBL Klebsiella UTIs (most recently treated with 5 days of ertapenem to end 4/16 then started on Cipro x 9 days ending 4/25).  Pt admitted as a transfer from [facility] where she presented early AM 5/6 due to n/v this AM and fevers x several days. Tmax in the .5. Infectious workup initiated in ED - Blood cx NGTD. UA negative for infection. Will repeat UA and cx as pt has hx recurrent UTIs. ID consulted (pt had appt w/ Dr Kessler scheduled for 5/7 due to fevers over the weekend). [facility] labs significant for anemia (H/H 6.9/21.0). Pt has hx chronic anemia of indeterminate etiology. Will repeat CBC and check iron studies, folate, B12, LDH, retic, haptoglobin, and Parvovirus PCR. Graft function stable with Cr 1.3 this AM and good UOP.         Previous Transplant: yes; organ: kidney, date: 1/14/19, complications: recurrent UTI's.    PTA Medications   Medication Sig    biotin 10,000 mcg Cap Take 10,000 mcg by mouth once daily.     ergocalciferol (ERGOCALCIFEROL) 50,000 unit Cap Take 1 capsule (50,000 Units total) by mouth every 7 days. Take on Tues    estradiol (ESTRACE) 0.01 % (0.1 mg/gram) vaginal cream Place 0.5 g vaginally twice a week. Apply to the vagina daily for two weeks then twice a week.    famotidine (PEPCID) 20 MG tablet Take 1 tablet (20 mg total) by mouth 2 (two) times daily. (Patient taking differently: Take 20 mg by mouth every evening. )    insulin (BASAGLAR KWIKPEN U-100 INSULIN) glargine 100 units/mL (3mL) SubQ pen Inject 30 Units into the skin every evening.    insulin aspart U-100 (NOVOLOG FLEXPEN U-100  INSULIN) 100 unit/mL InPn pen Novolog 10 u AC + correction Max TDD 40u (Patient taking differently: Novolog 10 units before meals and correction.  Max total daily dose 40 units.)    levothyroxine (SYNTHROID) 75 MCG tablet TAKE ONE TABLET BY MOUTH ONCE DAILY    LORazepam (ATIVAN) 2 MG Tab Take 1 mg by mouth 2 (two) times daily as needed (anxiety). Take for 8 days.    magnesium oxide (MAG-OX) 400 mg (241.3 mg magnesium) tablet Take 2 tablets (800 mg total) by mouth once daily.    multivitamin (THERAGRAN) tablet Take 1 tablet by mouth once daily.    oxybutynin (DITROPAN-XL) 10 MG 24 hr tablet Take 1 tablet (10 mg total) by mouth once daily.    rosuvastatin (CRESTOR) 10 MG tablet Take 1 tablet (10 mg total) by mouth once daily.    tacrolimus (PROGRAF) 0.5 MG Cap Take 2 capsules (1 mg total) by mouth every 12 (twelve) hours.    VIIBRYD 40 mg Tab tablet TAKE ONE TABLET BY MOUTH ONCE DAILY       Review of patient's allergies indicates:   Allergen Reactions    Torsemide Diarrhea     Diarrhea stopped once discontinued med    Codeine Itching     Can take oxycodone and hydrocodone with Benadryl       Past Medical History:   Diagnosis Date    Anemia     Anemia in chronic kidney disease, on chronic dialysis 7/12/2017    Arthritis     Asthma     allergic airway    CKD stage III (moderate) 2/18/2019    Colon polyp     Depression     Diabetes mellitus     Diverticulosis     Eosinophilia     ESRD (end stage renal disease)     stage V, due for living donor 9/2018    ESRD on dialysis     GERD (gastroesophageal reflux disease)     Gout     Gout     Hyperlipidemia     Hypertension     Low back pain     MRSA carrier     Obesity     Renal disorder     Rotator cuff syndrome--left     Thyroid disease     Ulnar neuropathy of right upper extremity     Uncontrolled type 2 diabetes mellitus with hyperglycemia      Past Surgical History:   Procedure Laterality Date    ACHILLES TENDON SURGERY Left May 2015     ARTHROSCOPY, HIP Left 10/3/2013    [facility]    ARTHROSCOPY-HIP WITH TROCHANTERIC BURSECTOMY Left 10/3/2013    [facility]    ARTHROSCOPY-SHOULDER Left 11/24/2015    [facility]    ARTHROSCOPY-SHOULDER WITH SUBACROMIAL DECOMPRESSION Left 7/12/2016    [facility]    BACK SURGERY      CHOLECYSTECTOMY      COLONOSCOPY N/A 9/6/2017    [facility]    DEBRIDEMENT-SHOULDER-ARTHROSCOPIC Left 7/12/2016    [facility]    DEBRIDEMENT-SHOULDER-ARTHROSCOPIC; LABRAL Left 11/24/2015    [facility]    DIALYSIS FISTULA CREATION  12/2017    FEMOROPLASTY, ARTHROSCOPIC Left 10/3/2013    [facility]    HEMORRHOID SURGERY      INJECTION-AMNIOX Left 7/12/2016    [facility]    JOINT REPLACEMENT      left    KNEE SURGERY      LYSIS-ADHESION Left 11/24/2015    [facility]    REPAIR ROTATOR CUFF ARTHROSCOPIC Left 7/12/2016    [facility]    REPAIR-TENDON-BICEP Left 11/24/2015    [facility]    SHOULDER ARTHROSCOPY      SHOULDER SURGERY      TRANSPLANT, KIDNEY N/A 1/14/2019    Performed by [facility]    UPPER GASTROINTESTINAL ENDOSCOPY  ~2013     Kirt     Family History     Problem Relation (Age of Onset)    Alzheimer's disease Mother    COPD Maternal Aunt    Diabetes Mother, Sister, Maternal Grandmother, Maternal Aunt    Heart disease Father, Brother, Maternal Aunt    Hyperlipidemia Mother    Hypertension Maternal Grandmother, Maternal Aunt    Lupus Sister    No Known Problems Son, Paternal Grandmother, Paternal Grandfather, Maternal Uncle, Paternal Aunt, Paternal Uncle    Ovarian cancer Sister    Stroke Father     "Thyroid disease Mother        Tobacco Use    Smoking status: Never Smoker    Smokeless tobacco: Never Used   Substance and Sexual Activity    Alcohol use: No    Drug use: No    Sexual activity: Never        Review of Systems   Constitutional: Positive for appetite change, fatigue and fever. Negative for chills.   HENT: Negative for trouble swallowing.    Eyes: Negative for visual disturbance.   Respiratory: Positive for cough. Negative for shortness of breath.    Cardiovascular: Negative for chest pain.   Gastrointestinal: Negative for abdominal pain, diarrhea, nausea and vomiting.   Genitourinary: Negative for difficulty urinating.   Neurological: Positive for weakness.     Objective:     Vital Signs (Most Recent):  Temp: 100.1 °F (37.8 °C) (05/07/19 0021)  Pulse: 68 (05/07/19 0021)  Resp: 18 (05/07/19 0021)  BP: (!) 144/70 (05/07/19 0021)  Height: 5' 7" (170.2 cm)  Weight: 93.7 kg (206 lb 9.1 oz)  Body mass index is 32.35 kg/m².     Physical Exam   Constitutional: She appears well-developed.   HENT:   Head: Normocephalic.   Eyes: Conjunctivae are normal.   Cardiovascular: Normal rate and regular rhythm.   Pulmonary/Chest: Effort normal.   Abdominal: Soft. She exhibits no distension. There is no tenderness.   Neurological: She is alert.   Skin: Skin is warm and dry.       Laboratory  Labs pending    "

## 2019-05-07 NOTE — ASSESSMENT & PLAN NOTE
"- Admitted as transfer from [facility] ED w/ recurrent fever, Tmax 101.5 at previous hospital.   - [facility] Blood cx 5/6 NGTD  - UA clean in ED, repeat also neg. Urine cx pending as pt has hx recurrent UTIs (ESBL)  - Graft fx stable  - CXR 5/6: "Borderline heart size. No intrapulmonary masses or infiltrates are seen. No pneumothorax or pleural effusion is noted."  - Lactate wnl   - LFTs slightly elevated, likely 2/2 infection.   - ID consulted, appreciate recs.  - Possible cause of fevers thought to be Parvo virus. IVIG ordered per ID for treatment.   - CMV PCR, BK virus, and Parvo pending.   "

## 2019-05-07 NOTE — ASSESSMENT & PLAN NOTE
- [facility] labs significant for anemia (H/H 6.9/21.0).   - Pt has hx chronic anemia (previously likely 2/2 ESRD, but now that renal fx recovered, unclear etiology)  - Iron studies, folate, B12, LDH, retic, and haptoglobin reviewed.    - H&H significantly low this am and pt replaced with two units of PRBCs.

## 2019-05-07 NOTE — H&P
[facility]  Kidney Transplant  H&P      Subjective:     Chief Complaint/Reason for Admission: Fever and anemia    History of Present Illness:  Ms. Newell is a 60 y/o F with PMHx of chronic back pain, anemia, and DM/HTN nephropathy ESRD now s/p living kidney transplant 1/14/2019 (Campath induction, CMV D+/R). Her post op course has been significant for multiple readmissions 2/2 fatigue, weakness, BOYER, and recurrent ESBL Klebsiella UTIs (most recently treated with 5 days of ertapenem to end 4/16 then started on Cipro x 9 days ending 4/25).  Pt admitted as a transfer from [facility] where she presented early AM 5/6 due to n/v this AM and fevers x several days. Tmax in the .5. Infectious workup initiated in ED - Blood cx NGTD. UA negative for infection. Will repeat UA and cx as pt has hx recurrent UTIs. ID consulted (pt had appt w/ Dr Kessler scheduled for 5/7 due to fevers over the weekend). [facility] labs significant for anemia (H/H 6.9/21.0). Pt has hx chronic anemia of indeterminate etiology. Will repeat CBC and check iron studies, folate, B12, LDH, retic, haptoglobin, and Parvovirus PCR. Graft function stable with Cr 1.3 this AM and good UOP.         Previous Transplant: yes; organ: kidney, date: 1/14/19, complications: recurrent UTI's.    PTA Medications   Medication Sig    biotin 10,000 mcg Cap Take 10,000 mcg by mouth once daily.     ergocalciferol (ERGOCALCIFEROL) 50,000 unit Cap Take 1 capsule (50,000 Units total) by mouth every 7 days. Take on Tues    estradiol (ESTRACE) 0.01 % (0.1 mg/gram) vaginal cream Place 0.5 g vaginally twice a week. Apply to the vagina daily for two weeks then twice a week.    famotidine (PEPCID) 20 MG tablet Take 1 tablet (20 mg total) by mouth 2 (two) times daily. (Patient taking differently: Take 20 mg by mouth every evening. )    insulin (BASAGLAR KWIKPEN U-100 INSULIN) glargine 100 units/mL (3mL) SubQ pen Inject 30 Units into the skin every  evening.    insulin aspart U-100 (NOVOLOG FLEXPEN U-100 INSULIN) 100 unit/mL InPn pen Novolog 10 u AC + correction Max TDD 40u (Patient taking differently: Novolog 10 units before meals and correction.  Max total daily dose 40 units.)    levothyroxine (SYNTHROID) 75 MCG tablet TAKE ONE TABLET BY MOUTH ONCE DAILY    LORazepam (ATIVAN) 2 MG Tab Take 1 mg by mouth 2 (two) times daily as needed (anxiety). Take for 8 days.    magnesium oxide (MAG-OX) 400 mg (241.3 mg magnesium) tablet Take 2 tablets (800 mg total) by mouth once daily.    multivitamin (THERAGRAN) tablet Take 1 tablet by mouth once daily.    oxybutynin (DITROPAN-XL) 10 MG 24 hr tablet Take 1 tablet (10 mg total) by mouth once daily.    rosuvastatin (CRESTOR) 10 MG tablet Take 1 tablet (10 mg total) by mouth once daily.    tacrolimus (PROGRAF) 0.5 MG Cap Take 2 capsules (1 mg total) by mouth every 12 (twelve) hours.    VIIBRYD 40 mg Tab tablet TAKE ONE TABLET BY MOUTH ONCE DAILY       Review of patient's allergies indicates:   Allergen Reactions    Torsemide Diarrhea     Diarrhea stopped once discontinued med    Codeine Itching     Can take oxycodone and hydrocodone with Benadryl       Past Medical History:   Diagnosis Date    Anemia     Anemia in chronic kidney disease, on chronic dialysis 7/12/2017    Arthritis     Asthma     allergic airway    CKD stage III (moderate) 2/18/2019    Colon polyp     Depression     Diabetes mellitus     Diverticulosis     Eosinophilia     ESRD (end stage renal disease)     stage V, due for living donor 9/2018    ESRD on dialysis     GERD (gastroesophageal reflux disease)     Gout     Gout     Hyperlipidemia     Hypertension     Low back pain     MRSA carrier     Obesity     Renal disorder     Rotator cuff syndrome--left     Thyroid disease     Ulnar neuropathy of right upper extremity     Uncontrolled type 2 diabetes mellitus with hyperglycemia      Past Surgical History:   Procedure  Laterality Date    ACHILLES TENDON SURGERY Left May 2015    ARTHROSCOPY, HIP Left 10/3/2013    [facility] at [facility]    ARTHROSCOPY-HIP WITH TROCHANTERIC BURSECTOMY Left 10/3/2013    [facility]    ARTHROSCOPY-SHOULDER Left 11/24/2015    [facility]    ARTHROSCOPY-SHOULDER WITH SUBACROMIAL DECOMPRESSION Left 7/12/2016    [facility]    BACK SURGERY      CHOLECYSTECTOMY      COLONOSCOPY N/A 9/6/2017    [facility]    DEBRIDEMENT-SHOULDER-ARTHROSCOPIC Left 7/12/2016    [facility]    DEBRIDEMENT-SHOULDER-ARTHROSCOPIC; LABRAL Left 11/24/2015    [facility] at [facility]    DIALYSIS FISTULA CREATION  12/2017    FEMOROPLASTY, ARTHROSCOPIC Left 10/3/2013    [facility]    HEMORRHOID SURGERY      INJECTION-AMNIOX Left 7/12/2016    [facility]    JOINT REPLACEMENT      left    KNEE SURGERY      LYSIS-ADHESION Left 11/24/2015    [facility] at [facility]    REPAIR ROTATOR CUFF ARTHROSCOPIC Left 7/12/2016    [facility] at [facility]    REPAIR-TENDON-BICEP Left 11/24/2015    [facility]    SHOULDER ARTHROSCOPY      SHOULDER SURGERY      TRANSPLANT, KIDNEY N/A 1/14/2019    [facility] OR [address] [place] [address]    UPPER GASTROINTESTINAL ENDOSCOPY  ~2013     Kirt     Family History     Problem Relation (Age of Onset)    Alzheimer's disease Mother    COPD Maternal Aunt    Diabetes Mother, Sister, Maternal Grandmother, Maternal Aunt    Heart disease Father, Brother, Maternal Aunt    Hyperlipidemia Mother    Hypertension Maternal Grandmother, Maternal Aunt    Lupus Sister    No Known Problems Son, Paternal Grandmother, Paternal Grandfather, Maternal Uncle, Paternal Aunt,  "Paternal Uncle    Ovarian cancer Sister    Stroke Father    Thyroid disease Mother        Tobacco Use    Smoking status: Never Smoker    Smokeless tobacco: Never Used   Substance and Sexual Activity    Alcohol use: No    Drug use: No    Sexual activity: Never        Review of Systems   Constitutional: Positive for appetite change, fatigue and fever. Negative for chills.   HENT: Negative for trouble swallowing.    Eyes: Negative for visual disturbance.   Respiratory: Positive for cough. Negative for shortness of breath.    Cardiovascular: Negative for chest pain.   Gastrointestinal: Negative for abdominal pain, diarrhea, nausea and vomiting.   Genitourinary: Negative for difficulty urinating.   Neurological: Positive for weakness.     Objective:     Vital Signs (Most Recent):  Temp: 100.1 °F (37.8 °C) (05/07/19 0021)  Pulse: 68 (05/07/19 0021)  Resp: 18 (05/07/19 0021)  BP: (!) 144/70 (05/07/19 0021)  Height: 5' 7" (170.2 cm)  Weight: 93.7 kg (206 lb 9.1 oz)  Body mass index is 32.35 kg/m².     Physical Exam   Constitutional: She appears well-developed.   HENT:   Head: Normocephalic.   Eyes: Conjunctivae are normal.   Cardiovascular: Normal rate and regular rhythm.   Pulmonary/Chest: Effort normal.   Abdominal: Soft. She exhibits no distension. There is no tenderness.   Neurological: She is alert.   Skin: Skin is warm and dry.       Laboratory  Labs pending      Assessment/Plan:     * Fever  - Admitted as transfer from [facility] ED w/ recurrent fever, Tmax 101.5 this AM  - [facility] Blood cx 5/6 NGTD  - UA clean in ED, will repeat and send cx as pt has hx recurrent UTIs (ESBL)  - Graft fx stable (Cr 1.3)  - CXR 5/6: "Borderline heart size. No intrapulmonary masses or infiltrates are seen. No pneumothorax or pleural effusion is noted."  - Lactate wnl   - LFTs slightly elevated, likely 2/2 infection (will repeat CMP tonight)  - ID consulted, appreciate recs.    History of recurrent urinary tract " "infection  - Hx recurrent ESBL UTI, most recently treated w/ ertapenem scheduled to stop 4/16  - Given Cipro x 9 days ending 4/25  - Pt has planned urethrocystoscopy for recurrent UTIs  - UA in ED clean, will repeat UA and send cx considering hx     At risk for opportunistic infections  - Pt is scheduled for monthly pentamidine treatments for PCP prophylaxis (next appt 5/15/19)  - Pt enrolled in CMV prophylaxis drug study (CMV D+/R-)   - CMV PCR pending. PCR 4/29 undetected.        Long-term use of immunosuppressant medication  - See "prophylactic immunotherapy."      Status post living-donor kidney transplantation  - S/p living kidney transplant 1/14/2019 2/2 DM/HTN (Campath induction, CMV D+/R).   - Post op course significant for multiple readmissions 2/2 fatigue, weakness, BOYER, and recurrent ESBL Klebsiella UTIs   - Now admitted as transfer from [facility] ED for fever (Tmax 101.5 today) and n/v   - See "fever."  - Last Allograft US 4/12/19 "upper normal to slightly elevated renal allograft resistive indices. peritransplant fluid collection, unchanged."  - Graft fx stable with Cr of 1.3 and adequate UOP.  - Continue strict Is and Os.  - Continue daily RFP.     Gastroesophageal reflux disease without esophagitis  - Continue home meds.      Prophylactic immunotherapy  - Continue Prograf. Monitor trough and adjust level as needed to achieve therapeutic dose.  - Cellcept on hold 2/2 recurrent infection.        Anemia of chronic disease  - [facility] labs significant for anemia (H/H 6.9/21.0).   - Pt has hx chronic anemia (previously likely 2/2 ESRD, but now that renal fx recovered, unclear etiology)  - Will repeat CBC and check iron studies, folate, B12, LDH, retic, and haptoglobin, and Parvovirus PCR.   - Type & screen + assess need for transfusion based on labs.      Depression  - Continue home meds.    Uncontrolled type 2 diabetes mellitus, with long-term current use of insulin  - Diabetic diet.  - Endocrine " consulted, appreciate recs.            Chacha Toth MD  Kidney Transplant  [facility]

## 2019-05-07 NOTE — SUBJECTIVE & OBJECTIVE
Interval HPI:   Overnight events:     BG is within goal on current SQ insulin regimen. Patient is now admitted to TSU after being transferred from [facility].     Eating:   <25%  Nausea: No  Hypoglycemia and intervention: No  Fever: No  TPN and/or TF: No  If yes, type of TF/TPN and rate: None    PMH, PSH, FH, SH  reviewed     Review of Systems  Constitutional: Negative for weight changes.  Eyes: Positive for visual disturbance.  Respiratory: Positive for cough.   Cardiovascular: Negative for chest pain.  Gastrointestinal: Negative for nausea. Positive for diarrhea  Endocrine: Positive for polyuria, polydipsia.  Musculoskeletal: Negative for back pain.  Skin: Negative for rash.  Neurological: Negative for syncope.  Psychiatric/Behavioral: Negative for depression.      Current Medications and/or Treatments Impacting Glycemic Control  Immunotherapy:    Immunosuppressants         Stop Route Frequency     tacrolimus capsule 1 mg      -- Oral 2 times daily        Steroids:   Hormones (From admission, onward)    None        Pressors:    Autonomic Drugs (From admission, onward)    None        Hyperglycemia/Diabetes Medications:   Antihyperglycemics (From admission, onward)    Start     Stop Route Frequency Ordered    05/07/19 1130  insulin aspart U-100 pen 10 Units      -- SubQ 3 times daily with meals 05/07/19 0837    05/07/19 0020  insulin aspart U-100 pen 0-5 Units      -- SubQ Before meals & nightly PRN 05/07/19 0020             PHYSICAL EXAMINATION:  Vitals:    05/07/19 0959   BP: 128/62   Pulse: 68   Resp: 18   Temp: 98.2 °F (36.8 °C)     Body mass index is 32.7 kg/m².    Physical Exam   Constitutional: Well developed, well nourished, NAD.  ENT:  normal hearing.  Neck: Supple; trachea midline;   Cardiovascular: Normal heart sounds, no LE edema. DP +2 bilaterally.  Lungs: Normal effort; lungs anterior bilaterally clear to auscultation.  Abdomen: Soft, obese, no masses, no hernias.  MS: No clubbing or  cyanosis of nails noted;  Skin: No rashes, lesions, or ulcers; no nodules. Injection sites show mild hypertropthy or atrophy.  Psychiatric: Good judgement and insight; normal mood and affect.  Neurological: Cranial nerves are grossly intact.  Foot: nails in good condition, no amputations noted.

## 2019-05-07 NOTE — SUBJECTIVE & OBJECTIVE
Subjective:   History of Present Illness:  Ms. Newell is a 58 y/o F with PMHx of chronic back pain, anemia, and DM/HTN nephropathy ESRD now s/p living kidney transplant 1/14/2019 (Campath induction, CMV D+/R). Her post op course has been significant for multiple readmissions 2/2 fatigue, weakness, BOYER, and recurrent ESBL Klebsiella UTIs (most recently treated with 5 days of ertapenem to end 4/16 then started on Cipro x 9 days ending 4/25).  Pt admitted as a transfer from [facility] where she presented early AM 5/6 due to n/v this AM and fevers x several days. Tmax in the .5. Infectious workup initiated in ED - Blood cx NGTD. UA negative for infection. Will repeat UA and cx as pt has hx recurrent UTIs. ID consulted (pt had appt w/ Dr Kessler scheduled for 5/7 due to fevers over the weekend). [facility] labs significant for anemia (H/H 6.9/21.0). Pt has hx chronic anemia of indeterminate etiology. Will repeat CBC and check iron studies, folate, B12, LDH, retic, haptoglobin, and Parvovirus PCR. Graft function stable with Cr 1.3 this AM and good UOP.         Hospital Course:  Interval history: no acute events overnight. Pt reports feeling tired and unwell this am. Tmax this am 100.1 but noted with 101.5 at previous hospital. ID consulted for further management as pt with h/o recurrent ESBL UTIs and persistent fevers. Most recent kidney u/s noted with fluid collections and will plan for an abdominal CT today to further assess collections as source of fevers. H&H significantly low on admit and pt transfused 2 units for replacement. Repeat CMV PCR pending. Monitor.       Past Medical, Surgical, Family, and Social History:   Unchanged from H&P.    Scheduled Meds:   ergocalciferol  50,000 Units Oral Q7 Days    famotidine  20 mg Oral QHS    heparin (porcine)  5,000 Units Subcutaneous Q8H    Immune Globulin G (IGG)-PRO-IGA 10 % injection (Privigen)  1,000 mg/kg Intravenous Q24H    insulin aspart U-100  5  Units Subcutaneous TIDWM    insulin detemir U-100  24 Units Subcutaneous QHS    levothyroxine  75 mcg Oral Daily    magnesium oxide  800 mg Oral Daily    multivitamin  1 tablet Oral Daily    oxybutynin  10 mg Oral Daily    rosuvastatin  10 mg Oral Daily    tacrolimus  1 mg Oral BID    vilazodone  40 mg Oral Daily     Continuous Infusions:  PRN Meds:acetaminophen, dextrose 50%, dextrose 50%, diphenhydrAMINE, glucagon (human recombinant), glucose, glucose, insulin aspart U-100, LORazepam, omnipaque, ondansetron, promethazine (PHENERGAN) IVPB, sodium chloride 0.9%    Intake/Output - Last 3 Shifts       05/05 0700 - 05/06 0659 05/06 0700 - 05/07 0659 05/07 0700 - 05/08 0659    P.O.  0 610    Blood  306.3     Total Intake(mL/kg)  306.3 (3.2) 610 (6.4)    Urine (mL/kg/hr)   450 (0.5)    Stool   0    Total Output   450    Net  +306.3 +160           Urine Occurrence  1 x 2 x    Stool Occurrence  0 x 1 x           Review of Systems   Constitutional: Positive for activity change, appetite change, fatigue and fever.   HENT: Negative.  Negative for congestion and facial swelling.    Eyes: Negative.    Respiratory: Negative.  Negative for chest tightness, shortness of breath and wheezing.    Cardiovascular: Negative.  Negative for chest pain, palpitations and leg swelling.   Gastrointestinal: Negative.  Negative for abdominal distention, abdominal pain, constipation, diarrhea, nausea and vomiting.   Genitourinary: Negative.  Negative for decreased urine volume, difficulty urinating, dysuria, hematuria and urgency.   Musculoskeletal: Negative.  Negative for back pain, neck pain and neck stiffness.   Skin: Negative.  Negative for color change, pallor and rash.   Allergic/Immunologic: Positive for immunocompromised state.   Neurological: Positive for weakness. Negative for dizziness, seizures, speech difficulty, light-headedness and headaches.   Psychiatric/Behavioral: Negative.  Negative for behavioral problems,  "hallucinations and suicidal ideas. The patient is not nervous/anxious.       Objective:     Vital Signs (Most Recent):  Temp: 98 °F (36.7 °C) (05/07/19 1519)  Pulse: (!) 58 (05/07/19 1519)  Resp: 18 (05/07/19 1519)  BP: (!) 162/71 (05/07/19 1519)  SpO2: 96 % (05/07/19 1519) Vital Signs (24h Range):  Temp:  [97.7 °F (36.5 °C)-100.1 °F (37.8 °C)] 98 °F (36.7 °C)  Pulse:  [58-83] 58  Resp:  [16-18] 18  SpO2:  [94 %-100 %] 96 %  BP: (115-176)/(56-77) 162/71     Weight: 94.7 kg (208 lb 12.4 oz)  Height: 5' 7" (170.2 cm)  Body mass index is 32.7 kg/m².    Physical Exam   Constitutional: She is oriented to person, place, and time. She appears well-developed. No distress.   HENT:   Head: Normocephalic and atraumatic.   Neck: Normal range of motion. Neck supple.   Cardiovascular: Normal rate, regular rhythm and normal heart sounds.   No murmur heard.  Pulmonary/Chest: Effort normal. No respiratory distress. She has decreased breath sounds in the right lower field and the left lower field. She has no wheezes. She exhibits no tenderness.   Abdominal: Soft. Bowel sounds are normal. She exhibits no distension. There is no tenderness.   Musculoskeletal: Normal range of motion. She exhibits no edema or tenderness.   Neurological: She is alert and oriented to person, place, and time.   Skin: Skin is warm and dry. She is not diaphoretic.   Psychiatric: She has a normal mood and affect. Her behavior is normal. Judgment and thought content normal.   Nursing note and vitals reviewed.      Laboratory:  CBC:   Recent Labs   Lab 05/06/19  0355 05/07/19  0111 05/07/19  0754   WBC 5.20 2.82* 2.66*   RBC 2.39* 2.17* 2.58*   HGB 6.9* 6.4* 7.6*   HCT 21.0* 20.4* 23.4*    160 150   MCV 88 94 91   MCH 28.9 29.5 29.5   MCHC 32.9 31.4* 32.5     CMP:   Recent Labs   Lab 05/06/19  0355 05/07/19  0111 05/07/19  0754   * 156* 130*   CALCIUM 9.0 8.8 8.9   ALBUMIN 3.7 3.2* 3.2*   PROT 6.6 6.1  --     141 142   K 4.2 4.2 4.7   CO2 21* " 24 25    109 109   BUN 13 14 13   CREATININE 1.3 1.1 1.1   ALKPHOS 195* 161*  --    ALT 60* 46*  --    AST 60* 42*  --      Coagulation: No results for input(s): PT, APTT in the last 168 hours.  Labs within the past 24 hours have been reviewed.    Diagnostic Results:  None

## 2019-05-07 NOTE — ASSESSMENT & PLAN NOTE
"- S/p living kidney transplant 1/14/2019 2/2 DM/HTN (Campath induction, CMV D+/R).   - Post op course significant for multiple readmissions 2/2 fatigue, weakness, BOYER, and recurrent ESBL Klebsiella UTIs   - Now admitted as transfer from [facility] ED for fever (Tmax 101.5 today) and n/v   - See "fever."   - Last Allograft US 4/12/19 "upper normal to slightly elevated renal allograft resistive indices. peritransplant fluid collection, unchanged."  - Graft fx stable and adequate UOP.  - Continue strict Is and Os.  - Continue daily RFP.   "

## 2019-05-07 NOTE — ASSESSMENT & PLAN NOTE
- [facility] labs significant for anemia (H/H 6.9/21.0).   - Pt has hx chronic anemia (previously likely 2/2 ESRD, but now that renal fx recovered, unclear etiology)  - Will repeat CBC and check iron studies, folate, B12, LDH, retic, and haptoglobin, and Parvovirus PCR.   - Type & screen + assess need for transfusion based on labs.

## 2019-05-07 NOTE — ASSESSMENT & PLAN NOTE
- Hx recurrent ESBL UTI, most recently treated w/ ertapenem scheduled to stop 4/16  - Given Cipro x 9 days ending 4/25  - Pt has planned urethrocystoscopy for recurrent UTIs  - UA in ED clean, will repeat UA and send cx considering hx

## 2019-05-07 NOTE — CONSULTS
[facility]  Kidney Transplant  Consult Note    Inpatient consult to Kidney/Pancreas Transplant Medicine  Consult performed by: Patel Hernandez NP  Consult ordered by: Patel Hernandez NP          Subjective:   History of Present Illness:  Ms. Newell is a 60 y/o F with PMHx of chronic back pain, anemia, and DM/HTN nephropathy ESRD now s/p living kidney transplant 1/14/2019 (Campath induction, CMV D+/R). Her post op course has been significant for multiple readmissions 2/2 fatigue, weakness, BOYER, and recurrent ESBL Klebsiella UTIs (most recently treated with 5 days of ertapenem to end 4/16 then started on Cipro x 9 days ending 4/25).  Pt admitted as a transfer from [facility] where she presented early AM 5/6 due to n/v this AM and fevers x several days. Tmax in the .5. Infectious workup initiated in ED - Blood cx NGTD. UA negative for infection. Will repeat UA and cx as pt has hx recurrent UTIs. ID consulted (pt had appt w/ Dr Kessler scheduled for 5/7 due to fevers over the weekend). [facility] labs significant for anemia (H/H 6.9/21.0). Pt has hx chronic anemia of indeterminate etiology. Will repeat CBC and check iron studies, folate, B12, LDH, retic, haptoglobin, and Parvovirus PCR. Graft function stable with Cr 1.3 this AM and good UOP.         Hospital Course:  Interval history: no acute events overnight. Pt reports feeling tired and unwell this am. Tmax this am 100.1 but noted with 101.5 at previous hospital. ID consulted for further management as pt with h/o recurrent ESBL UTIs and persistent fevers. Most recent kidney u/s noted with fluid collections and will plan for an abdominal CT today to further assess collections as source of fevers. H&H significantly low on admit and pt transfused 2 units for replacement. Repeat CMV PCR pending. Monitor.       Past Medical, Surgical, Family, and Social History:   Unchanged from H&P.    Scheduled Meds:   ergocalciferol  50,000 Units  Oral Q7 Days    famotidine  20 mg Oral QHS    heparin (porcine)  5,000 Units Subcutaneous Q8H    Immune Globulin G (IGG)-PRO-IGA 10 % injection (Privigen)  1,000 mg/kg Intravenous Q24H    insulin aspart U-100  5 Units Subcutaneous TIDWM    insulin detemir U-100  24 Units Subcutaneous QHS    levothyroxine  75 mcg Oral Daily    magnesium oxide  800 mg Oral Daily    multivitamin  1 tablet Oral Daily    oxybutynin  10 mg Oral Daily    rosuvastatin  10 mg Oral Daily    tacrolimus  1 mg Oral BID    vilazodone  40 mg Oral Daily     Continuous Infusions:  PRN Meds:acetaminophen, dextrose 50%, dextrose 50%, diphenhydrAMINE, glucagon (human recombinant), glucose, glucose, insulin aspart U-100, LORazepam, omnipaque, ondansetron, promethazine (PHENERGAN) IVPB, sodium chloride 0.9%    Intake/Output - Last 3 Shifts       05/05 0700 - 05/06 0659 05/06 0700 - 05/07 0659 05/07 0700 - 05/08 0659    P.O.  0 610    Blood  306.3     Total Intake(mL/kg)  306.3 (3.2) 610 (6.4)    Urine (mL/kg/hr)   450 (0.5)    Stool   0    Total Output   450    Net  +306.3 +160           Urine Occurrence  1 x 2 x    Stool Occurrence  0 x 1 x           Review of Systems   Constitutional: Positive for activity change, appetite change, fatigue and fever.   HENT: Negative.  Negative for congestion and facial swelling.    Eyes: Negative.    Respiratory: Negative.  Negative for chest tightness, shortness of breath and wheezing.    Cardiovascular: Negative.  Negative for chest pain, palpitations and leg swelling.   Gastrointestinal: Negative.  Negative for abdominal distention, abdominal pain, constipation, diarrhea, nausea and vomiting.   Genitourinary: Negative.  Negative for decreased urine volume, difficulty urinating, dysuria, hematuria and urgency.   Musculoskeletal: Negative.  Negative for back pain, neck pain and neck stiffness.   Skin: Negative.  Negative for color change, pallor and rash.   Allergic/Immunologic: Positive for  "immunocompromised state.   Neurological: Positive for weakness. Negative for dizziness, seizures, speech difficulty, light-headedness and headaches.   Psychiatric/Behavioral: Negative.  Negative for behavioral problems, hallucinations and suicidal ideas. The patient is not nervous/anxious.       Objective:     Vital Signs (Most Recent):  Temp: 98 °F (36.7 °C) (05/07/19 1519)  Pulse: (!) 58 (05/07/19 1519)  Resp: 18 (05/07/19 1519)  BP: (!) 162/71 (05/07/19 1519)  SpO2: 96 % (05/07/19 1519) Vital Signs (24h Range):  Temp:  [97.7 °F (36.5 °C)-100.1 °F (37.8 °C)] 98 °F (36.7 °C)  Pulse:  [58-83] 58  Resp:  [16-18] 18  SpO2:  [94 %-100 %] 96 %  BP: (115-176)/(56-77) 162/71     Weight: 94.7 kg (208 lb 12.4 oz)  Height: 5' 7" (170.2 cm)  Body mass index is 32.7 kg/m².    Physical Exam   Constitutional: She is oriented to person, place, and time. She appears well-developed. No distress.   HENT:   Head: Normocephalic and atraumatic.   Neck: Normal range of motion. Neck supple.   Cardiovascular: Normal rate, regular rhythm and normal heart sounds.   No murmur heard.  Pulmonary/Chest: Effort normal. No respiratory distress. She has decreased breath sounds in the right lower field and the left lower field. She has no wheezes. She exhibits no tenderness.   Abdominal: Soft. Bowel sounds are normal. She exhibits no distension. There is no tenderness.   Musculoskeletal: Normal range of motion. She exhibits no edema or tenderness.   Neurological: She is alert and oriented to person, place, and time.   Skin: Skin is warm and dry. She is not diaphoretic.   Psychiatric: She has a normal mood and affect. Her behavior is normal. Judgment and thought content normal.   Nursing note and vitals reviewed.      Laboratory:  CBC:   Recent Labs   Lab 05/06/19  0355 05/07/19  0111 05/07/19  0754   WBC 5.20 2.82* 2.66*   RBC 2.39* 2.17* 2.58*   HGB 6.9* 6.4* 7.6*   HCT 21.0* 20.4* 23.4*    160 150   MCV 88 94 91   MCH 28.9 29.5 29.5   MCHC " "32.9 31.4* 32.5     CMP:   Recent Labs   Lab 05/06/19  0355 05/07/19  0111 05/07/19  0754   * 156* 130*   CALCIUM 9.0 8.8 8.9   ALBUMIN 3.7 3.2* 3.2*   PROT 6.6 6.1  --     141 142   K 4.2 4.2 4.7   CO2 21* 24 25    109 109   BUN 13 14 13   CREATININE 1.3 1.1 1.1   ALKPHOS 195* 161*  --    ALT 60* 46*  --    AST 60* 42*  --      Coagulation: No results for input(s): PT, APTT in the last 168 hours.  Labs within the past 24 hours have been reviewed.    Diagnostic Results:  None    Assessment/Plan:     * Fever  - Admitted as transfer from [facility] ED w/ recurrent fever, Tmax 101.5 at previous hospital.   - [facility] Blood cx 5/6 NGTD  - UA clean in ED, repeat also neg. Urine cx pending as pt has hx recurrent UTIs (ESBL)  - Graft fx stable  - CXR 5/6: "Borderline heart size. No intrapulmonary masses or infiltrates are seen. No pneumothorax or pleural effusion is noted."  - Lactate wnl   - LFTs slightly elevated, likely 2/2 infection.   - ID consulted, appreciate recs.  - Possible cause of fevers thought to be Parvo virus. IVIG ordered per ID for treatment.   - CMV PCR, BK virus, and Parvo pending.     History of recurrent urinary tract infection  - Hx recurrent ESBL UTI, most recently treated w/ ertapenem scheduled to stop 4/16  - Given Cipro x 9 days ending 4/25  - Pt has planned urethrocystoscopy for recurrent UTIs  - UA in ED clean. Repeat UA negative on admit. Urine cx pending.     At risk for opportunistic infections  - Pt is scheduled for monthly pentamidine treatments for PCP prophylaxis (next appt 5/15/19)  - Pt enrolled in CMV prophylaxis drug study (CMV D+/R-)   - CMV PCR pending. PCR 4/29 undetected.        Long-term use of immunosuppressant medication  - See "prophylactic immunotherapy."      Status post living-donor kidney transplantation  - S/p living kidney transplant 1/14/2019 2/2 DM/HTN (Campath induction, CMV D+/R).   - Post op course significant for multiple readmissions " "2/2 fatigue, weakness, BOYER, and recurrent ESBL Klebsiella UTIs   - Now admitted as transfer from [facility] ED for fever (Tmax 101.5 today) and n/v   - See "fever."   - Last Allograft US 4/12/19 "upper normal to slightly elevated renal allograft resistive indices. peritransplant fluid collection, unchanged."  - Graft fx stable and adequate UOP.  - Continue strict Is and Os.  - Continue daily RFP.     Gastroesophageal reflux disease without esophagitis  - Continue home meds.      Prophylactic immunotherapy  - Continue Prograf. Monitor trough and adjust level as needed to achieve therapeutic dose.  - Cellcept on hold 2/2 recurrent infection.        Anemia of chronic disease  - [facility] labs significant for anemia (H/H 6.9/21.0).   - Pt has hx chronic anemia (previously likely 2/2 ESRD, but now that renal fx recovered, unclear etiology)  - Iron studies, folate, B12, LDH, retic, and haptoglobin reviewed.    - H&H significantly low this am and pt replaced with two units of PRBCs.     Depression  - Continue home meds.    Uncontrolled type 2 diabetes mellitus, with long-term current use of insulin  - Diabetic diet.  - Endocrine consulted, appreciate recs.          Discharge Planning:  Not a candidate for d/c at this time.       Patel Hernandez NP  Kidney Transplant  [facility]  "

## 2019-05-07 NOTE — ASSESSMENT & PLAN NOTE
"- S/p living kidney transplant 1/14/2019 2/2 DM/HTN (Campath induction, CMV D+/R).   - Post op course significant for multiple readmissions 2/2 fatigue, weakness, BOYER, and recurrent ESBL Klebsiella UTIs   - Now admitted as transfer from [facility] ED for fever (Tmax 101.5 today) and n/v   - See "fever."  - Last Allograft US 4/12/19 "upper normal to slightly elevated renal allograft resistive indices. peritransplant fluid collection, unchanged."  - Graft fx stable with Cr of 1.3 and adequate UOP.  - Continue strict Is and Os.  - Continue daily RFP.   "

## 2019-05-07 NOTE — HPI
Reason for Consult: Management of T2DM, Hyperglycemia      Surgical Procedure and Date:   OKT x1 on 01/14/2019     Diabetes diagnosis year:     2005     Home Diabetes Medications:  Basaglar to 30 u qhs; Start Novolog 6 u AC + correction with meals     How often checking glucose at home? 1-3 x day   BG readings on regimen: low 200's  Hypoglycemia on the regimen?  No  Missed doses on regimen?  No     Diabetes Complications include:     Hyperglycemia and Diabetic peripheral neuropathy      Complicating diabetes co morbidities:   Thyroid disease and Obesity       HPI:   Patient is a 59 y.o. female with a diagnosis of chronic back pain, anemia, and DM/HTN nephropathy ESRD now s/p living kidney transplant 1/14/2019. Pt admitted as a transfer from [facility] where she presented early AM 5/6 due to n/v this AM and fevers x several days. Patient sees Mana Webber DNP, NP for chronic DM management. Endocrinology consulted to manage DM[place]hyperglycemia [place] [facility]     Lab Results   Component Value Date    HGBA1C 8.9 (H) 05/07/2019    HGBA1C 8.9 (H) 05/07/2019

## 2019-05-07 NOTE — SUBJECTIVE & OBJECTIVE
Past Medical History:   Diagnosis Date    Anemia     Anemia in chronic kidney disease, on chronic dialysis 7/12/2017    Arthritis     Asthma     allergic airway    CKD stage III (moderate) 2/18/2019    Colon polyp     Depression     Diabetes mellitus     Diverticulosis     Eosinophilia     ESRD (end stage renal disease)     stage V, due for living donor 9/2018    ESRD on dialysis     GERD (gastroesophageal reflux disease)     Gout     Gout     Hyperlipidemia     Hypertension     Low back pain     MRSA carrier     Obesity     Renal disorder     Rotator cuff syndrome--left     Thyroid disease     Ulnar neuropathy of right upper extremity     Uncontrolled type 2 diabetes mellitus with hyperglycemia        Past Surgical History:   Procedure Laterality Date    ACHILLES TENDON SURGERY Left May 2015    ARTHROSCOPY, HIP Left 10/3/2013    [facility] at [facility]    ARTHROSCOPY-HIP WITH TROCHANTERIC BURSECTOMY Left 10/3/2013    [facility] at [facility] OR    ARTHROSCOPY-SHOULDER Left 11/24/2015    [facility] at [facility]    ARTHROSCOPY-SHOULDER WITH SUBACROMIAL DECOMPRESSION Left 7/12/2016    [facility] at [facility] OR    BACK SURGERY      CHOLECYSTECTOMY      COLONOSCOPY N/A 9/6/2017    [facility]    DEBRIDEMENT-SHOULDER-ARTHROSCOPIC Left 7/12/2016    [facility] at [facility] OR    DEBRIDEMENT-SHOULDER-ARTHROSCOPIC; LABRAL Left 11/24/2015    [facility] at [facility] OR    DIALYSIS FISTULA CREATION  12/2017    FEMOROPLASTY, ARTHROSCOPIC Left 10/3/2013    [facility] at [facility] OR    HEMORRHOID SURGERY      INJECTION-AMNIOX Left 7/12/2016    [facility] at [facility] OR    JOINT REPLACEMENT      left    KNEE SURGERY      LYSIS-ADHESION Left 11/24/2015    [facility] at [facility] OR    REPAIR ROTATOR CUFF  ARTHROSCOPIC Left 7/12/2016    [facility]    REPAIR-TENDON-BICEP Left 11/24/2015    [facility] at [facility] OR    SHOULDER ARTHROSCOPY      SHOULDER SURGERY      TRANSPLANT, KIDNEY N/A 1/14/2019    [facility] OR [address] [place] [address]    UPPER GASTROINTESTINAL ENDOSCOPY  ~2013     [facility]       Review of patient's allergies indicates:   Allergen Reactions    Torsemide Diarrhea     Diarrhea stopped once discontinued med    Codeine Itching     Can take oxycodone and hydrocodone with Benadryl       Medications:  Medications Prior to Admission   Medication Sig    biotin 10,000 mcg Cap Take 10,000 mcg by mouth once daily.     ergocalciferol (ERGOCALCIFEROL) 50,000 unit Cap Take 1 capsule (50,000 Units total) by mouth every 7 days. Take on Tues    estradiol (ESTRACE) 0.01 % (0.1 mg/gram) vaginal cream Place 0.5 g vaginally twice a week. Apply to the vagina daily for two weeks then twice a week.    famotidine (PEPCID) 20 MG tablet Take 1 tablet (20 mg total) by mouth 2 (two) times daily. (Patient taking differently: Take 20 mg by mouth every evening. )    insulin (BASAGLAR KWIKPEN U-100 INSULIN) glargine 100 units/mL (3mL) SubQ pen Inject 30 Units into the skin every evening.    insulin aspart U-100 (NOVOLOG FLEXPEN U-100 INSULIN) 100 unit/mL InPn pen Novolog 10 u AC + correction Max TDD 40u (Patient taking differently: Novolog 10 units before meals and correction.  Max total daily dose 40 units.)    INV acyclovir/placebo capsule Take 400 mg by mouth every 12 (twelve) hours. Take as directed by physician. For investigational use only. Study MK 8228-002. IRB#2017.512; Subject# [id number]    INV MK-8228/placebo 480 mg oral tablet Take 480 mg by mouth once daily. FOR INVESTIGATIONAL USE ONLY. Patient Study# [id number]. Subject will take one 480 mg tablet once daily. Study MK 8228-002. IRB#2017.512    INV valganciclovir/placebo tablet Take 900 mg by  mouth once daily. Take as directed by physician. FOR INVESTIGATIONAL USE ONLY. Study MK 8228-002. IRB#2017.512; Subject# [id number]    levothyroxine (SYNTHROID) 75 MCG tablet TAKE ONE TABLET BY MOUTH ONCE DAILY    LORazepam (ATIVAN) 2 MG Tab Take 1 mg by mouth 2 (two) times daily as needed (anxiety). Take for 8 days.    magnesium oxide (MAG-OX) 400 mg (241.3 mg magnesium) tablet Take 2 tablets (800 mg total) by mouth once daily.    multivitamin (THERAGRAN) tablet Take 1 tablet by mouth once daily.    oxybutynin (DITROPAN-XL) 10 MG 24 hr tablet Take 1 tablet (10 mg total) by mouth once daily.    rosuvastatin (CRESTOR) 10 MG tablet Take 1 tablet (10 mg total) by mouth once daily.    tacrolimus (PROGRAF) 0.5 MG Cap Take 2 capsules (1 mg total) by mouth every 12 (twelve) hours.    VIIBRYD 40 mg Tab tablet TAKE ONE TABLET BY MOUTH ONCE DAILY     Antibiotics (From admission, onward)    None        Antifungals (From admission, onward)    None        Antivirals (From admission, onward)    None           Immunization History   Administered Date(s) Administered    Hepatitis A / Hepatitis B 07/12/2017, 08/14/2017, 01/08/2018    Influenza 03/15/2017, 09/09/2018    Influenza - Quadrivalent - MDCK - PF 12/05/2018    Influenza - Trivalent - PF (ADULT) 03/15/2017    PPD Test 04/13/2018    Pneumococcal Conjugate - 13 Valent 03/15/2017    Pneumococcal Polysaccharide - 23 Valent 04/30/2019    Tdap 07/12/2017       Family History     Problem Relation (Age of Onset)    Alzheimer's disease Mother    COPD Maternal Aunt    Diabetes Mother, Sister, Maternal Grandmother, Maternal Aunt    Heart disease Father, Brother, Maternal Aunt    Hyperlipidemia Mother    Hypertension Maternal Grandmother, Maternal Aunt    Lupus Sister    No Known Problems Son, Paternal Grandmother, Paternal Grandfather, Maternal Uncle, Paternal Aunt, Paternal Uncle    Ovarian cancer Sister    Stroke Father    Thyroid disease Mother        Social History      Socioeconomic History    Marital status: Significant Other     Spouse name: Not on file    Number of children: 2    Years of education: Not on file    Highest education level: Not on file   Occupational History    Occupation: retired     Comment:    Social Needs    Financial resource strain: Not on file    Food insecurity:     Worry: Not on file     Inability: Not on file    Transportation needs:     Medical: Not on file     Non-medical: Not on file   Tobacco Use    Smoking status: Never Smoker    Smokeless tobacco: Never Used   Substance and Sexual Activity    Alcohol use: No    Drug use: No    Sexual activity: Never   Lifestyle    Physical activity:     Days per week: Not on file     Minutes per session: Not on file    Stress: Not on file   Relationships    Social connections:     Talks on phone: Not on file     Gets together: Not on file     Attends Mu-ism service: Not on file     Active member of club or organization: Not on file     Attends meetings of clubs or organizations: Not on file     Relationship status: Not on file   Other Topics Concern    Not on file   Social History Narrative    Not on file     Review of Systems   Constitutional: Positive for fever.   Respiratory: Positive for cough.    Gastrointestinal: Positive for diarrhea and vomiting.   All other systems reviewed and are negative.    Objective:     Vital Signs (Most Recent):  Temp: 98.2 °F (36.8 °C) (05/07/19 1313)  Pulse: 71 (05/07/19 1313)  Resp: 18 (05/07/19 1313)  BP: (!) 148/68 (05/07/19 1359)  SpO2: 95 % (05/07/19 1313) Vital Signs (24h Range):  Temp:  [97.7 °F (36.5 °C)-100.1 °F (37.8 °C)] 98.2 °F (36.8 °C)  Pulse:  [61-83] 71  Resp:  [16-18] 18  SpO2:  [94 %-100 %] 95 %  BP: (115-176)/(56-77) 148/68     Weight: 94.7 kg (208 lb 12.4 oz)  Body mass index is 32.7 kg/m².    Estimated Creatinine Clearance: 63.4 mL/min (based on SCr of 1.1 mg/dL).    Physical Exam   Constitutional: She is oriented  to person, place, and time. No distress.   Eyes: Pupils are equal, round, and reactive to light. EOM are normal.   Neck: No JVD present.   Cardiovascular: Normal rate and regular rhythm. Exam reveals no friction rub.   No murmur heard.  Pulmonary/Chest: Effort normal and breath sounds normal. No stridor. No respiratory distress. She has no wheezes.   Abdominal: Soft. Bowel sounds are normal. She exhibits no distension and no mass. There is no tenderness. There is no guarding.   Musculoskeletal: She exhibits no edema.   Neurological: She is alert and oriented to person, place, and time.   Skin: Skin is warm and dry. No rash noted. She is not diaphoretic.   Psychiatric: She has a normal mood and affect. Her behavior is normal. Judgment and thought content normal.       Significant Labs:   CBC:   Recent Labs   Lab 05/06/19  0355 05/07/19  0111 05/07/19  0754   WBC 5.20 2.82* 2.66*   HGB 6.9* 6.4* 7.6*   HCT 21.0* 20.4* 23.4*    160 150     CMP:   Recent Labs   Lab 05/06/19  0355 05/07/19  0111 05/07/19  0754    141 142   K 4.2 4.2 4.7    109 109   CO2 21* 24 25   * 156* 130*   BUN 13 14 13   CREATININE 1.3 1.1 1.1   CALCIUM 9.0 8.8 8.9   PROT 6.6 6.1  --    ALBUMIN 3.7 3.2* 3.2*   BILITOT 0.6 0.5  --    ALKPHOS 195* 161*  --    AST 60* 42*  --    ALT 60* 46*  --    ANIONGAP 11 8 8   EGFRNONAA 44* 54.3* 54.3*       Significant Imaging: I have reviewed all pertinent imaging results/findings within the past 24 hours.

## 2019-05-07 NOTE — CONSULTS
[facility]  Endocrinology  Diabetes Consult Note    Consult Requested by: Beto Slade Jr., MD   Reason for admit: Fever    HISTORY OF PRESENT ILLNESS:  Reason for Consult: Management of T2DM, Hyperglycemia      Surgical Procedure and Date:   OKT x1 on 01/14/2019     Diabetes diagnosis year:     2005     Home Diabetes Medications:  Basaglar to 30 u qhs; Start Novolog 6 u AC + correction with meals     How often checking glucose at home? 1-3 x day   BG readings on regimen: low 200's  Hypoglycemia on the regimen?  No  Missed doses on regimen?  No     Diabetes Complications include:     Hyperglycemia and Diabetic peripheral neuropathy      Complicating diabetes co morbidities:   Thyroid disease and Obesity       HPI:   Patient is a 59 y.o. female with a diagnosis of chronic back pain, anemia, and DM/HTN nephropathy ESRD now s/p living kidney transplant 1/14/2019. Pt admitted as a transfer from [facility] where she presented early AM 5/6 due to n/v this AM and fevers x several days. Patient sees Mana Webber, KEVIN, NP for chronic DM management. Endocrinology consulted to manage DM/hyperglycemia during admission to [facility].     Lab Results   Component Value Date    HGBA1C 8.9 (H) 05/07/2019    HGBA1C 8.9 (H) 05/07/2019                     Interval HPI:   Overnight events:     BG is within goal on current SQ insulin regimen. Patient is now admitted to [facility] after being transferred from [facility].     Eating:   <25%  Nausea: No  Hypoglycemia and intervention: No  Fever: No  TPN and/or TF: No  If yes, type of TF/TPN and rate: None    PMH, PSH, FH, SH  reviewed     Review of Systems  Constitutional: Negative for weight changes.  Eyes: Positive for visual disturbance.  Respiratory: Positive for cough.   Cardiovascular: Negative for chest pain.  Gastrointestinal: Negative for nausea. Positive for diarrhea  Endocrine: Positive for polyuria, polydipsia.  Musculoskeletal: Negative for  back pain.  Skin: Negative for rash.  Neurological: Negative for syncope.  Psychiatric/Behavioral: Negative for depression.      Current Medications and/or Treatments Impacting Glycemic Control  Immunotherapy:    Immunosuppressants         Stop Route Frequency     tacrolimus capsule 1 mg      -- Oral 2 times daily        Steroids:   Hormones (From admission, onward)    None        Pressors:    Autonomic Drugs (From admission, onward)    None        Hyperglycemia/Diabetes Medications:   Antihyperglycemics (From admission, onward)    Start     Stop Route Frequency Ordered    05/07/19 1130  insulin aspart U-100 pen 10 Units      -- SubQ 3 times daily with meals 05/07/19 0837    05/07/19 0020  insulin aspart U-100 pen 0-5 Units      -- SubQ Before meals & nightly PRN 05/07/19 0020             PHYSICAL EXAMINATION:  Vitals:    05/07/19 0959   BP: 128/62   Pulse: 68   Resp: 18   Temp: 98.2 °F (36.8 °C)     Body mass index is 32.7 kg/m².    Physical Exam   Constitutional: Well developed, well nourished, NAD.  ENT:  normal hearing.  Neck: Supple; trachea midline;   Cardiovascular: Normal heart sounds, no LE edema. DP +2 bilaterally.  Lungs: Normal effort; lungs anterior bilaterally clear to auscultation.  Abdomen: Soft, obese, no masses, no hernias.  MS: No clubbing or cyanosis of nails noted;  Skin: No rashes, lesions, or ulcers; no nodules. Injection sites show mild hypertropthy or atrophy.  Psychiatric: Good judgement and insight; normal mood and affect.  Neurological: Cranial nerves are grossly intact.  Foot: nails in good condition, no amputations noted.        Labs Reviewed and Include   Recent Labs   Lab 05/07/19  0111 05/07/19  0754   * 130*   CALCIUM 8.8 8.9   ALBUMIN 3.2* 3.2*   PROT 6.1  --     142   K 4.2 4.7   CO2 24 25    109   BUN 14 13   CREATININE 1.1 1.1   ALKPHOS 161*  --    ALT 46*  --    AST 42*  --    BILITOT 0.5  --      Lab Results   Component Value Date    WBC 2.66 (L) 05/07/2019     HGB 7.6 (L) 05/07/2019    HCT 23.4 (L) 05/07/2019    MCV 91 05/07/2019     05/07/2019     No results for input(s): TSH, FREET4 in the last 168 hours.  Lab Results   Component Value Date    HGBA1C 8.9 (H) 05/07/2019    HGBA1C 8.9 (H) 05/07/2019       Nutritional status:   Body mass index is 32.7 kg/m².  Lab Results   Component Value Date    ALBUMIN 3.2 (L) 05/07/2019    ALBUMIN 3.2 (L) 05/07/2019    ALBUMIN 3.7 05/06/2019     No results found for: PREALBUMIN    Estimated Creatinine Clearance: 63.4 mL/min (based on SCr of 1.1 mg/dL).    Accu-Checks  Recent Labs     05/06/19  2122 05/06/19  2255 05/07/19  0812 05/07/19  1122   POCTGLUCOSE 175* 161* 141* 171*        ASSESSMENT and PLAN    * Fever  Infection may elevate BG readings  Managed per primary team  Avoid hypoglycemia        Uncontrolled type 2 diabetes mellitus, with long-term current use of insulin  BG goal 140 - 180    Discontinue Novolog 10 units TIDWM.   Start Levemir 24 units HS. 20% reduction from home dosing.   Start Novolog 5 units TIDWM. 20% reduction from home dosing.   Moderate Dose SQ Insulin Correction Scale.    ** Please call Endocrine for any BG related issues **  ** Please notify Endocrine for any change and/or advance in diet**    Discharge Recommendations:    Most likely continue home MDI.    Patient will need to continue to keep BG logs, and follow-up with PCP on [facility] for continued DM management.       Status post living-donor kidney transplantation  Managed per primary team  Avoid hypoglycemia      Prophylactic immunotherapy  On immunosepresive therapy per transplant team; may elevate BG readings        Long-term use of immunosuppressant medication  On immunosepresive therapy per transplant team; may elevate BG readings        Anemia of chronic disease  May affect HbA1c Accuracy         Plan discussed with patient, family, and RN at bedside.     Tre Sykes, FIDENCIO  Endocrinology  [facility]

## 2019-05-07 NOTE — ED NOTES
Patient is sitting up on the side of bed, talking with her significant other. No needs or questions at this time.

## 2019-05-07 NOTE — PROGRESS NOTES
[facility]  Kidney Transplant  Progress Note      Reason for Follow-up: Reassessment of Kidney Transplant - 1/14/2019  (#1) recipient and management of immunosuppression.    ORGAN: LEFT KIDNEY    Donor Type: Living    Donor CMV Status:   Donor HBcAB:  Donor HCV Status:  Donor HBV DERRICK:   Donor HCV DERRICK:       Subjective:   History of Present Illness:  Ms. Newell is a 60 y/o F with PMHx of chronic back pain, anemia, and DM/HTN nephropathy ESRD now s/p living kidney transplant 1/14/2019 (Campath induction, CMV D+/R). Her post op course has been significant for multiple readmissions 2/2 fatigue, weakness, OBYER, and recurrent ESBL Klebsiella UTIs (most recently treated with 5 days of ertapenem to end 4/16 then started on Cipro x 9 days ending 4/25).  Pt admitted as a transfer from [facility] where she presented early AM 5/6 due to n/v this AM and fevers x several days. Tmax in the .5. Infectious workup initiated in ED - Blood cx NGTD. UA negative for infection. Will repeat UA and cx as pt has hx recurrent UTIs. ID consulted (pt had appt w/ Dr Kessler scheduled for 5/7 due to fevers over the weekend). [facility] labs significant for anemia (H/H 6.9/21.0). Pt has hx chronic anemia of indeterminate etiology. Will repeat CBC and check iron studies, folate, B12, LDH, retic, haptoglobin, and Parvovirus PCR. Graft function stable with Cr 1.3 this AM and good UOP.         Hospital Course:  Interval history: no acute events overnight. Pt reports feeling tired and unwell this am. Tmax this am 100.1 but noted with 101.5 at previous hospital. ID consulted for further management as pt with h/o recurrent ESBL UTIs and persistent fevers. Most recent kidney u/s noted with fluid collections and will plan for an abdominal CT today to further assess collections as source of fevers. H&H significantly low on admit and pt transfused 2 units for replacement. Repeat CMV PCR pending. Monitor.       Past Medical,  Surgical, Family, and Social History:   Unchanged from H&P.    Scheduled Meds:   acetaminophen  650 mg Oral Once    diphenhydrAMINE  25 mg Oral Once    ergocalciferol  50,000 Units Oral Q7 Days    famotidine  20 mg Oral QHS    heparin (porcine)  5,000 Units Subcutaneous Q8H    Immune Globulin G (IGG)-PRO-IGA 10 % injection (Privigen)  1,000 mg/kg Intravenous Q24H    insulin aspart U-100  5 Units Subcutaneous TIDWM    insulin detemir U-100  24 Units Subcutaneous QHS    levothyroxine  75 mcg Oral Daily    magnesium oxide  800 mg Oral Daily    multivitamin  1 tablet Oral Daily    oxybutynin  10 mg Oral Daily    rosuvastatin  10 mg Oral Daily    tacrolimus  1 mg Oral BID    vilazodone  40 mg Oral Daily     Continuous Infusions:  PRN Meds:acetaminophen, dextrose 50%, dextrose 50%, diphenhydrAMINE, glucagon (human recombinant), glucose, glucose, insulin aspart U-100, LORazepam, ondansetron, promethazine (PHENERGAN) IVPB, sodium chloride 0.9%    Intake/Output - Last 3 Shifts       05/05 0700 - 05/06 0659 05/06 0700 - 05/07 0659 05/07 0700 - 05/08 0659    P.O.  0 610    Blood  306.3     Total Intake(mL/kg)  306.3 (3.2) 610 (6.4)    Urine (mL/kg/hr)   450 (0.8)    Stool   0    Total Output   450    Net  +306.3 +160           Urine Occurrence  1 x 2 x    Stool Occurrence  0 x 1 x           Review of Systems   Constitutional: Positive for activity change, appetite change, fatigue and fever.   HENT: Negative.  Negative for congestion and facial swelling.    Eyes: Negative.    Respiratory: Negative.  Negative for chest tightness, shortness of breath and wheezing.    Cardiovascular: Negative.  Negative for chest pain, palpitations and leg swelling.   Gastrointestinal: Negative.  Negative for abdominal distention, abdominal pain, constipation, diarrhea, nausea and vomiting.   Genitourinary: Negative.  Negative for decreased urine volume, difficulty urinating, dysuria, hematuria and urgency.   Musculoskeletal:  "Negative.  Negative for back pain, neck pain and neck stiffness.   Skin: Negative.  Negative for color change, pallor and rash.   Allergic/Immunologic: Positive for immunocompromised state.   Neurological: Positive for weakness. Negative for dizziness, seizures, speech difficulty, light-headedness and headaches.   Psychiatric/Behavioral: Negative.  Negative for behavioral problems, hallucinations and suicidal ideas. The patient is not nervous/anxious.       Objective:     Vital Signs (Most Recent):  Temp: 97.7 °F (36.5 °C) (05/07/19 1219)  Pulse: 61 (05/07/19 1219)  Resp: 18 (05/07/19 1219)  BP: (!) 151/70 (05/07/19 1219)  SpO2: 95 % (05/07/19 1219) Vital Signs (24h Range):  Temp:  [97.7 °F (36.5 °C)-100.1 °F (37.8 °C)] 97.7 °F (36.5 °C)  Pulse:  [61-83] 61  Resp:  [16-18] 18  SpO2:  [94 %-100 %] 95 %  BP: (115-176)/(56-77) 151/70     Weight: 94.7 kg (208 lb 12.4 oz)  Height: 5' 7" (170.2 cm)  Body mass index is 32.7 kg/m².    Physical Exam   Constitutional: She is oriented to person, place, and time. She appears well-developed. No distress.   HENT:   Head: Normocephalic and atraumatic.   Neck: Normal range of motion. Neck supple.   Cardiovascular: Normal rate, regular rhythm and normal heart sounds.   No murmur heard.  Pulmonary/Chest: Effort normal. No respiratory distress. She has decreased breath sounds in the right lower field and the left lower field. She has no wheezes. She exhibits no tenderness.   Abdominal: Soft. Bowel sounds are normal. She exhibits no distension. There is no tenderness.   Musculoskeletal: Normal range of motion. She exhibits no edema or tenderness.   Neurological: She is alert and oriented to person, place, and time.   Skin: Skin is warm and dry. She is not diaphoretic.   Psychiatric: She has a normal mood and affect. Her behavior is normal. Judgment and thought content normal.   Nursing note and vitals reviewed.      Laboratory:  CBC:   Recent Labs   Lab 05/06/19  0355 05/07/19  0111 " "05/07/19  0754   WBC 5.20 2.82* 2.66*   RBC 2.39* 2.17* 2.58*   HGB 6.9* 6.4* 7.6*   HCT 21.0* 20.4* 23.4*    160 150   MCV 88 94 91   MCH 28.9 29.5 29.5   MCHC 32.9 31.4* 32.5     CMP:   Recent Labs   Lab 05/06/19  0355 05/07/19  0111 05/07/19  0754   * 156* 130*   CALCIUM 9.0 8.8 8.9   ALBUMIN 3.7 3.2* 3.2*   PROT 6.6 6.1  --     141 142   K 4.2 4.2 4.7   CO2 21* 24 25    109 109   BUN 13 14 13   CREATININE 1.3 1.1 1.1   ALKPHOS 195* 161*  --    ALT 60* 46*  --    AST 60* 42*  --      Coagulation: No results for input(s): PT, APTT in the last 168 hours.  Labs within the past 24 hours have been reviewed.    Diagnostic Results:  None    Assessment/Plan:     * Fever  - Admitted as transfer from [facility] ED w/ recurrent fever, Tmax 101.5 at previous hospital.   - [facility] Blood cx 5/6 NGTD  - UA clean in ED, repeat also neg. Urine cx pending as pt has hx recurrent UTIs (ESBL)  - Graft fx stable  - CXR 5/6: "Borderline heart size. No intrapulmonary masses or infiltrates are seen. No pneumothorax or pleural effusion is noted."  - Lactate wnl   - LFTs slightly elevated, likely 2/2 infection.   - ID consulted, appreciate recs.  - Possible cause of fevers thought to be Parvo virus. IVIG ordered per ID for treatment.   - CMV PCR, BK virus, and Parvo pending.     History of recurrent urinary tract infection  - Hx recurrent ESBL UTI, most recently treated w/ ertapenem scheduled to stop 4/16  - Given Cipro x 9 days ending 4/25  - Pt has planned urethrocystoscopy for recurrent UTIs  - UA in ED clean. Repeat UA negative on admit. Urine cx pending.     At risk for opportunistic infections  - Pt is scheduled for monthly pentamidine treatments for PCP prophylaxis (next appt 5/15/19)  - Pt enrolled in CMV prophylaxis drug study (CMV D+/R-)   - CMV PCR pending. PCR 4/29 undetected.        Long-term use of immunosuppressant medication  - See "prophylactic immunotherapy."      Status post " "living-donor kidney transplantation  - S/p living kidney transplant 1/14/2019 2/2 DM/HTN (Campath induction, CMV D+/R).   - Post op course significant for multiple readmissions 2/2 fatigue, weakness, BOYER, and recurrent ESBL Klebsiella UTIs   - Now admitted as transfer from [facility] ED for fever (Tmax 101.5 today) and n/v   - See "fever."   - Last Allograft US 4/12/19 "upper normal to slightly elevated renal allograft resistive indices. peritransplant fluid collection, unchanged."  - Graft fx stable and adequate UOP.  - Continue strict Is and Os.  - Continue daily RFP.     Gastroesophageal reflux disease without esophagitis  - Continue home meds.      Prophylactic immunotherapy  - Continue Prograf. Monitor trough and adjust level as needed to achieve therapeutic dose.  - Cellcept on hold 2/2 recurrent infection.        Anemia of chronic disease  - [facility] labs significant for anemia (H/H 6.9/21.0).   - Pt has hx chronic anemia (previously likely 2/2 ESRD, but now that renal fx recovered, unclear etiology)  - Iron studies, folate, B12, LDH, retic, and haptoglobin reviewed.    - H&H significantly low this am and pt replaced with two units of PRBCs.     Depression  - Continue home meds.    Uncontrolled type 2 diabetes mellitus, with long-term current use of insulin  - Diabetic diet.  - Endocrine consulted, appreciate recs.          Discharge Planning:  Not a candidate for d/c at this time.     Patel Hernandez NP  Kidney Transplant  [facility]  "

## 2019-05-07 NOTE — ASSESSMENT & PLAN NOTE
"- Admitted as transfer from [facility] ED w/ recurrent fever, Tmax 101.5 at previous hospital.   - [facility] Blood cx 5/6 NGTD  - UA clean in ED, repeat also neg. Urine cx pending as pt has hx recurrent UTIs (ESBL)  - Graft fx stable  - CXR 5/6: "Borderline heart size. No intrapulmonary masses or infiltrates are seen. No pneumothorax or pleural effusion is noted."  - Lactate wnl   - LFTs slightly elevated, likely 2/2 infection.   - ID consulted, appreciate recs.  - Possible cause of fevers thought to be Parvo virus. IVIG ordered per ID for treatment.   - CMV PCR, BK virus, and Parvo pending.   "

## 2019-05-07 NOTE — ASSESSMENT & PLAN NOTE
- [facility] labs significant for anemia (H/H 6.9/21.0).   - Pt has hx chronic anemia (previously likely 2/2 ESRD, but now that renal fx recovered, unclear etiology)  - Iron studies, folate, B12, LDH, retic, and haptoglobin reviewed.    - H&H significantly low this am and pt replaced with two units of PRBCs.

## 2019-05-07 NOTE — ASSESSMENT & PLAN NOTE
BG goal 140 - 180    Discontinue Novolog 10 units TIDWM.   Start Levemir 24 units HS. 20% reduction from home dosing.   Start Novolog 5 units TIDWM. 20% reduction from home dosing.   Moderate Dose SQ Insulin Correction Scale.    ** Please call Endocrine for any BG related issues **  ** Please notify Endocrine for any change and/or advance in diet**    Discharge Recommendations:    Most likely continue home MDI.    Patient will need to continue to keep BG logs, and follow-up with PCP on [facility] for continued DM management.

## 2019-05-07 NOTE — ASSESSMENT & PLAN NOTE
"- S/p living kidney transplant 1/14/2019 2/2 DM/HTN (Campath induction, CMV D+/R).   - Post op course significant for multiple readmissions 2/2 fatigue, weakness, BOYER, and recurrent ESBL Klebsiella UTIs   - Now admitted as transfer from [facility] ED for fever (Tmax 101.5 today) and n/v   - See "fever."   - Last Allograft US 4/12/19 "upper normal to slightly elevated renal allograft resistive indices. peritransplant fluid collection, unchanged."  - Graft fx stable and adequate UOP.  - Continue strict Is and Os.  - Continue daily RFP.   "

## 2019-05-07 NOTE — ASSESSMENT & PLAN NOTE
- Continue Prograf. Monitor trough and adjust level as needed to achieve therapeutic dose.  - Cellcept on hold 2/2 recurrent infection.

## 2019-05-07 NOTE — CONSULTS
[facility]  Infectious Disease  Consult Note    Patient Name: Andra Newell  MRN: [id number]  Admission Date: 5/7/2019  Hospital Length of Stay: 0 days  Attending Physician: Beto Slade Jr., MD  Primary Care Provider: Gita Cohen MD     Isolation Status: Special Contact    Patient information was obtained from patient, spouse/SO, past medical records and ER records.      Consult to Infectious Diseases  Consult performed by: Lizbeth Adair MD  Consult ordered by: Lidia Elizabeth PA-C        Assessment/Plan:     * Fever  Ms Newell has DM and HTN and had a living donor kidney transplant in 1/14/19, has since had UTIs and parvovirus infection and was positive for strongyloides serology prior to transplant. She now presents with acute on chronic GI symptoms as well as cough and fever. Routine infectious workup has been negative thus far.     Recs  - would do stool workup for CDiff, fecal leuks, OCP, rotavirus, norovirus, cryptosporidium, giardia  - check CMV  - await CT abd/pelvis  - would give IVIG for parvovirus  - check hepatitis labs  - may eventually re-treat for strongyloides after the above workup reviewed         Thank you for your consult. I will follow-up with patient. Please contact us if you have any additional questions.    Libzeth Adair MD  Infectious Disease  [facility]    Subjective:     Principal Problem: Fever    HPI: Ms. Newell is a 60 y/o F with PMHx of chronic back pain, anemia, and DM/HTN nephropathy ESRD now s/p living kidney transplant 1/14/2019 (Campath induction, CMV D+/R). Her post op course has been significant for multiple readmissions 2/2 fatigue, weakness, BOYER, and recurrent ESBL Klebsiella UTIs (most recently treated with 5 days of ertapenem to end 4/16 then started on Cipro x 9 days ending 4/25). Also has documentation of parvovirus infection and positive Strongyloides serology.  Pt admitted as a transfer from [facility] where she presented early AM 5/6 due to n/v this AM and fevers x several days. Tmax in the .5.     Since admission, she has had a bland UA, normal CXR and no dysuria. She reiterates that she had fever, vomiting, cough for 3-5 days and chronic diarrhoea for months. Also mild (new onset) ALT/AST elevations were noted.     She lives in [place] with her partner, has one dog, is not employed and has no recent travel outside the .           Past Medical History:   Diagnosis Date    Anemia     Anemia in chronic kidney disease, on chronic dialysis 7/12/2017    Arthritis     Asthma     allergic airway    CKD stage III (moderate) 2/18/2019    Colon polyp     Depression     Diabetes mellitus     Diverticulosis     Eosinophilia     ESRD (end stage renal disease)     stage V, due for living donor 9/2018    ESRD on dialysis     GERD (gastroesophageal reflux disease)     Gout     Gout     Hyperlipidemia     Hypertension     Low back pain     MRSA carrier     Obesity     Renal disorder     Rotator cuff syndrome--left     Thyroid disease     Ulnar neuropathy of right upper extremity     Uncontrolled type 2 diabetes mellitus with hyperglycemia        Past Surgical History:   Procedure Laterality Date    ACHILLES TENDON SURGERY Left May 2015    ARTHROSCOPY, HIP Left 10/3/2013    Performed by Moe Mel[facility]dez [facility] at [facility] [place]    ARTHROSCOPY-HIP WITH TROCHANTERIC BURSECTOMY Left 10/3/2013    Performed by Moe Mel[facility]dez [facility] at [facility] [place]    ARTHROSCOPY-SHOULDER Left 11/24/2015    Performed by Moe Mel[facility]dez [facility] at [facility] [place]    ARTHROSCOPY-SHOULDER WITH SUBACROMIAL DECOMPRESSION Left 7/12/2016    Performed by Moe Mel[facility]dez [facility] at [facility] [place]    BACK SURGERY      CHOLECYSTECTOMY      COLONOSCOPY N/A 9/6/2017    Performed by [facility] at [facility]    DEBRIDEMENT-SHOULDER-ARTHROSCOPIC Left 7/12/2016    Performed by Moe Mel[facility]dez [facility] at [facility] [place]    DEBRIDEMENT-SHOULDER-ARTHROSCOPIC; LABRAL  Left 11/24/2015    Performed by M[facility]dez MD at [facility] OR    DIALYSIS FISTULA CREATION  12/2017    FEMOROPLASTY, ARTHROSCOPIC Left 10/3/2013    Performed by [facility] at [facility] OR    HEMORRHOID SURGERY      INJECTION-AMNIOX Left 7/12/2016    Performed by [facility] at [facility] OR    JOINT REPLACEMENT      left    KNEE SURGERY      LYSIS-ADHESION Left 11/24/2015    Performed by [facility] at [facility] [place]    REPAIR ROTATOR CUFF ARTHROSCOPIC Left 7/12/2016    Performed by [facility]dez [facility] at [facility] OR    REPAIR-TENDON-BICEP Left 11/24/2015    Performed by [facility] at [facility] OR    SHOULDER ARTHROSCOPY      SHOULDER SURGERY      TRANSPLANT, KIDNEY N/A 1/14/2019    Performed by Roland [facility] at [facility] OR [address] [place] [address]    UPPER GASTROINTESTINAL ENDOSCOPY  ~2013    Dr. Moralez       Review of patient's allergies indicates:   Allergen Reactions    Torsemide Diarrhea     Diarrhea stopped once discontinued med    Codeine Itching     Can take oxycodone and hydrocodone with Benadryl       Medications:  Medications Prior to Admission   Medication Sig    biotin 10,000 mcg Cap Take 10,000 mcg by mouth once daily.     ergocalciferol (ERGOCALCIFEROL) 50,000 unit Cap Take 1 capsule (50,000 Units total) by mouth every 7 days. Take on Tues    estradiol (ESTRACE) 0.01 % (0.1 mg/gram) vaginal cream Place 0.5 g vaginally twice a week. Apply to the vagina daily for two weeks then twice a week.    famotidine (PEPCID) 20 MG tablet Take 1 tablet (20 mg total) by mouth 2 (two) times daily. (Patient taking differently: Take 20 mg by mouth every evening. )    insulin (BASAGLAR KWIKPEN U-100 INSULIN) glargine 100 units/mL (3mL) SubQ pen Inject 30 Units into the skin every evening.    insulin aspart U-100 (NOVOLOG FLEXPEN U-100 INSULIN) 100 unit/mL InPn pen Novolog 10 u AC + correction Max TDD 40u (Patient taking differently: Novolog 10 units before meals and correction.  Max total daily dose 40 units.)     INV acyclovir/placebo capsule Take 400 mg by mouth every 12 (twelve) hours. Take as directed by physician. For investigational use only. Study  8228-002. IRB#2017.512; Subject# [id number]    INV MK-8228/placebo 480 mg oral tablet Take 480 mg by mouth once daily. FOR INVESTIGATIONAL USE ONLY. Patient Study# [id number]. Subject will take one 480 mg tablet once daily. Study  8228-002. IRB#2017.512    INV valganciclovir/placebo tablet Take 900 mg by mouth once daily. Take as directed by physician. FOR INVESTIGATIONAL USE ONLY. Study  8228-002. IRB#2017.512; Subject# [id number]    levothyroxine (SYNTHROID) 75 MCG tablet TAKE ONE TABLET BY MOUTH ONCE DAILY    LORazepam (ATIVAN) 2 MG Tab Take 1 mg by mouth 2 (two) times daily as needed (anxiety). Take for 8 days.    magnesium oxide (MAG-OX) 400 mg (241.3 mg magnesium) tablet Take 2 tablets (800 mg total) by mouth once daily.    multivitamin (THERAGRAN) tablet Take 1 tablet by mouth once daily.    oxybutynin (DITROPAN-XL) 10 MG 24 hr tablet Take 1 tablet (10 mg total) by mouth once daily.    rosuvastatin (CRESTOR) 10 MG tablet Take 1 tablet (10 mg total) by mouth once daily.    tacrolimus (PROGRAF) 0.5 MG Cap Take 2 capsules (1 mg total) by mouth every 12 (twelve) hours.    VIIBRYD 40 mg Tab tablet TAKE ONE TABLET BY MOUTH ONCE DAILY     Antibiotics (From admission, onward)    None        Antifungals (From admission, onward)    None        Antivirals (From admission, onward)    None           Immunization History   Administered Date(s) Administered    Hepatitis A / Hepatitis B 07/12/2017, 08/14/2017, 01/08/2018    Influenza 03/15/2017, 09/09/2018    Influenza - Quadrivalent - MDCK - PF 12/05/2018    Influenza - Trivalent - PF (ADULT) 03/15/2017    PPD Test 04/13/2018    Pneumococcal Conjugate - 13 Valent 03/15/2017    Pneumococcal Polysaccharide - 23 Valent 04/30/2019    Tdap 07/12/2017       Family History     Problem Relation (Age of Onset)     Alzheimer's disease Mother    COPD Maternal Aunt    Diabetes Mother, Sister, Maternal Grandmother, Maternal Aunt    Heart disease Father, Brother, Maternal Aunt    Hyperlipidemia Mother    Hypertension Maternal Grandmother, Maternal Aunt    Lupus Sister    No Known Problems Son, Paternal Grandmother, Paternal Grandfather, Maternal Uncle, Paternal Aunt, Paternal Uncle    Ovarian cancer Sister    Stroke Father    Thyroid disease Mother        Social History     Socioeconomic History    Marital status: Significant Other     Spouse name: Not on file    Number of children: 2    Years of education: Not on file    Highest education level: Not on file   Occupational History    Occupation: retired     Comment:    Social Needs    Financial resource strain: Not on file    Food insecurity:     Worry: Not on file     Inability: Not on file    Transportation needs:     Medical: Not on file     Non-medical: Not on file   Tobacco Use    Smoking status: Never Smoker    Smokeless tobacco: Never Used   Substance and Sexual Activity    Alcohol use: No    Drug use: No    Sexual activity: Never   Lifestyle    Physical activity:     Days per week: Not on file     Minutes per session: Not on file    Stress: Not on file   Relationships    Social connections:     Talks on phone: Not on file     Gets together: Not on file     Attends Muslim service: Not on file     Active member of club or organization: Not on file     Attends meetings of clubs or organizations: Not on file     Relationship status: Not on file   Other Topics Concern    Not on file   Social History Narrative    Not on file     Review of Systems   Constitutional: Positive for fever.   Respiratory: Positive for cough.    Gastrointestinal: Positive for diarrhea and vomiting.   All other systems reviewed and are negative.    Objective:     Vital Signs (Most Recent):  Temp: 98.2 °F (36.8 °C) (05/07/19 1313)  Pulse: 71 (05/07/19 1313)  Resp:  18 (05/07/19 1313)  BP: (!) 148/68 (05/07/19 1359)  SpO2: 95 % (05/07/19 1313) Vital Signs (24h Range):  Temp:  [97.7 °F (36.5 °C)-100.1 °F (37.8 °C)] 98.2 °F (36.8 °C)  Pulse:  [61-83] 71  Resp:  [16-18] 18  SpO2:  [94 %-100 %] 95 %  BP: (115-176)/(56-77) 148/68     Weight: 94.7 kg (208 lb 12.4 oz)  Body mass index is 32.7 kg/m².    Estimated Creatinine Clearance: 63.4 mL/min (based on SCr of 1.1 mg/dL).    Physical Exam   Constitutional: She is oriented to person, place, and time. No distress.   Eyes: Pupils are equal, round, and reactive to light. EOM are normal.   Neck: No JVD present.   Cardiovascular: Normal rate and regular rhythm. Exam reveals no friction rub.   No murmur heard.  Pulmonary/Chest: Effort normal and breath sounds normal. No stridor. No respiratory distress. She has no wheezes.   Abdominal: Soft. Bowel sounds are normal. She exhibits no distension and no mass. There is no tenderness. There is no guarding.   Musculoskeletal: She exhibits no edema.   Neurological: She is alert and oriented to person, place, and time.   Skin: Skin is warm and dry. No rash noted. She is not diaphoretic.   Psychiatric: She has a normal mood and affect. Her behavior is normal. Judgment and thought content normal.       Significant Labs:   CBC:   Recent Labs   Lab 05/06/19  0355 05/07/19  0111 05/07/19  0754   WBC 5.20 2.82* 2.66*   HGB 6.9* 6.4* 7.6*   HCT 21.0* 20.4* 23.4*    160 150     CMP:   Recent Labs   Lab 05/06/19  0355 05/07/19  0111 05/07/19  0754    141 142   K 4.2 4.2 4.7    109 109   CO2 21* 24 25   * 156* 130*   BUN 13 14 13   CREATININE 1.3 1.1 1.1   CALCIUM 9.0 8.8 8.9   PROT 6.6 6.1  --    ALBUMIN 3.7 3.2* 3.2*   BILITOT 0.6 0.5  --    ALKPHOS 195* 161*  --    AST 60* 42*  --    ALT 60* 46*  --    ANIONGAP 11 8 8   EGFRNONAA 44* 54.3* 54.3*       Significant Imaging: I have reviewed all pertinent imaging results/findings within the past 24 hours.

## 2019-05-07 NOTE — HOSPITAL COURSE
Interval history:   BP fluctuated overnight, slightly improved with PRN hydralazine 10 mg x 1. Possibly 2/2 IVIG. Completed last dose of IVIG for txt of parvovirus overnight. This morning SBP 140s-150s. Will monitor today. Hydralazine 10 mg PRN for SBP > 160s. Pain controlled. Overall, she continues to feel better. Afebrile > 48 hours. Completed bowel prep as directed for colonoscopy. Underwent c-scopy/EGD today, unremarkable for acute pathology. Biopsies sent. Stool WBC, Giardia, cryptosporidium, rotavirus negative. GI pathogen panel still pending. Will start imodium per GI recs. CT a/p unremarkable for acute pathology. Parvovirus PCR qual positive 4/15/19. ID on board. Repeat Parvo PCR in process. H&H remains stable WBC trending down despite being cellcept, CMV treatment, and bactrim. Hematology on board. Did not give neupogen yesterday per their recs. ANC improved today without intervention.

## 2019-05-07 NOTE — PROGRESS NOTES
Notified MD Navneet of decreased H/H = 6.4/20.4.  MD ordered to transfuse 1 U PRBC. Will continue to monitor.

## 2019-05-07 NOTE — PLAN OF CARE
Problem: Adult Inpatient Plan of Care  Goal: Plan of Care Review  Outcome: Ongoing (interventions implemented as appropriate)  Pt is AAOx4, stand by assist/independent, and VSS. IVIG being infused. CT of abd ordered-pt scheduled to go for 17:30. 1 unit RBC administered and tolerated. Pt afebrile all shift. Blood glucose monitoring performed and treated as ordered. Pt's bed in lowest position, call bell within reach, nonskid socks on, and RN encouraged to call for any assistance. Will continue to monitor.

## 2019-05-07 NOTE — ASSESSMENT & PLAN NOTE
Ms Newell has DM and HTN and had a living donor kidney transplant in 1/14/19, has since had UTIs and parvovirus infection and was positive for strongyloides serology prior to transplant. She now presents with acute on chronic GI symptoms as well as cough and fever. Routine infectious workup has been negative thus far.     Recs  - would do stool workup for CDiff, fecal leuks, OCP, rotavirus, norovirus, cryptosporidium, giardia  - check CMV  - await CT abd/pelvis  - would give IVIG for parvovirus  - check hepatitis labs  - may eventually re-treat for strongyloides after the above workup reviewed

## 2019-05-07 NOTE — SUBJECTIVE & OBJECTIVE
Subjective:   History of Present Illness:  Ms. Newell is a 58 y/o F with PMHx of chronic back pain, anemia, and DM/HTN nephropathy ESRD now s/p living kidney transplant 1/14/2019 (Campath induction, CMV D+/R). Her post op course has been significant for multiple readmissions 2/2 fatigue, weakness, BOYER, and recurrent ESBL Klebsiella UTIs (most recently treated with 5 days of ertapenem to end 4/16 then started on Cipro x 9 days ending 4/25).  Pt admitted as a transfer from [facility] where she presented early AM 5/6 due to n/v this AM and fevers x several days. Tmax in the .5. Infectious workup initiated in ED - Blood cx NGTD. UA negative for infection. Will repeat UA and cx as pt has hx recurrent UTIs. ID consulted (pt had appt w/ Dr Kessler scheduled for 5/7 due to fevers over the weekend). [facility] labs significant for anemia (H/H 6.9/21.0). Pt has hx chronic anemia of indeterminate etiology. Will repeat CBC and check iron studies, folate, B12, LDH, retic, haptoglobin, and Parvovirus PCR. Graft function stable with Cr 1.3 this AM and good UOP.         Hospital Course:  Interval history: no acute events overnight. Pt reports feeling tired and unwell this am. Tmax this am 100.1 but noted with 101.5 at previous hospital. ID consulted for further management as pt with h/o recurrent ESBL UTIs and persistent fevers. Most recent kidney u/s noted with fluid collections and will plan for an abdominal CT today to further assess collections as source of fevers. H&H significantly low on admit and pt transfused 2 units for replacement. Repeat CMV PCR pending. Monitor.       Past Medical, Surgical, Family, and Social History:   Unchanged from H&P.    Scheduled Meds:   acetaminophen  650 mg Oral Once    diphenhydrAMINE  25 mg Oral Once    ergocalciferol  50,000 Units Oral Q7 Days    famotidine  20 mg Oral QHS    heparin (porcine)  5,000 Units Subcutaneous Q8H    Immune Globulin G (IGG)-PRO-IGA 10 % injection  (Privigen)  1,000 mg/kg Intravenous Q24H    insulin aspart U-100  5 Units Subcutaneous TIDWM    insulin detemir U-100  24 Units Subcutaneous QHS    levothyroxine  75 mcg Oral Daily    magnesium oxide  800 mg Oral Daily    multivitamin  1 tablet Oral Daily    oxybutynin  10 mg Oral Daily    rosuvastatin  10 mg Oral Daily    tacrolimus  1 mg Oral BID    vilazodone  40 mg Oral Daily     Continuous Infusions:  PRN Meds:acetaminophen, dextrose 50%, dextrose 50%, diphenhydrAMINE, glucagon (human recombinant), glucose, glucose, insulin aspart U-100, LORazepam, ondansetron, promethazine (PHENERGAN) IVPB, sodium chloride 0.9%    Intake/Output - Last 3 Shifts       05/05 0700 - 05/06 0659 05/06 0700 - 05/07 0659 05/07 0700 - 05/08 0659    P.O.  0 610    Blood  306.3     Total Intake(mL/kg)  306.3 (3.2) 610 (6.4)    Urine (mL/kg/hr)   450 (0.8)    Stool   0    Total Output   450    Net  +306.3 +160           Urine Occurrence  1 x 2 x    Stool Occurrence  0 x 1 x           Review of Systems   Constitutional: Positive for activity change, appetite change, fatigue and fever.   HENT: Negative.  Negative for congestion and facial swelling.    Eyes: Negative.    Respiratory: Negative.  Negative for chest tightness, shortness of breath and wheezing.    Cardiovascular: Negative.  Negative for chest pain, palpitations and leg swelling.   Gastrointestinal: Negative.  Negative for abdominal distention, abdominal pain, constipation, diarrhea, nausea and vomiting.   Genitourinary: Negative.  Negative for decreased urine volume, difficulty urinating, dysuria, hematuria and urgency.   Musculoskeletal: Negative.  Negative for back pain, neck pain and neck stiffness.   Skin: Negative.  Negative for color change, pallor and rash.   Allergic/Immunologic: Positive for immunocompromised state.   Neurological: Positive for weakness. Negative for dizziness, seizures, speech difficulty, light-headedness and headaches.  "  Psychiatric/Behavioral: Negative.  Negative for behavioral problems, hallucinations and suicidal ideas. The patient is not nervous/anxious.       Objective:     Vital Signs (Most Recent):  Temp: 97.7 °F (36.5 °C) (05/07/19 1219)  Pulse: 61 (05/07/19 1219)  Resp: 18 (05/07/19 1219)  BP: (!) 151/70 (05/07/19 1219)  SpO2: 95 % (05/07/19 1219) Vital Signs (24h Range):  Temp:  [97.7 °F (36.5 °C)-100.1 °F (37.8 °C)] 97.7 °F (36.5 °C)  Pulse:  [61-83] 61  Resp:  [16-18] 18  SpO2:  [94 %-100 %] 95 %  BP: (115-176)/(56-77) 151/70     Weight: 94.7 kg (208 lb 12.4 oz)  Height: 5' 7" (170.2 cm)  Body mass index is 32.7 kg/m².    Physical Exam   Constitutional: She is oriented to person, place, and time. She appears well-developed. No distress.   HENT:   Head: Normocephalic and atraumatic.   Neck: Normal range of motion. Neck supple.   Cardiovascular: Normal rate, regular rhythm and normal heart sounds.   No murmur heard.  Pulmonary/Chest: Effort normal. No respiratory distress. She has decreased breath sounds in the right lower field and the left lower field. She has no wheezes. She exhibits no tenderness.   Abdominal: Soft. Bowel sounds are normal. She exhibits no distension. There is no tenderness.   Musculoskeletal: Normal range of motion. She exhibits no edema or tenderness.   Neurological: She is alert and oriented to person, place, and time.   Skin: Skin is warm and dry. She is not diaphoretic.   Psychiatric: She has a normal mood and affect. Her behavior is normal. Judgment and thought content normal.   Nursing note and vitals reviewed.      Laboratory:  CBC:   Recent Labs   Lab 05/06/19  0355 05/07/19  0111 05/07/19  0754   WBC 5.20 2.82* 2.66*   RBC 2.39* 2.17* 2.58*   HGB 6.9* 6.4* 7.6*   HCT 21.0* 20.4* 23.4*    160 150   MCV 88 94 91   MCH 28.9 29.5 29.5   MCHC 32.9 31.4* 32.5     CMP:   Recent Labs   Lab 05/06/19  0355 05/07/19  0111 05/07/19  0754   * 156* 130*   CALCIUM 9.0 8.8 8.9   ALBUMIN 3.7 " 3.2* 3.2*   PROT 6.6 6.1  --     141 142   K 4.2 4.2 4.7   CO2 21* 24 25    109 109   BUN 13 14 13   CREATININE 1.3 1.1 1.1   ALKPHOS 195* 161*  --    ALT 60* 46*  --    AST 60* 42*  --      Coagulation: No results for input(s): PT, APTT in the last 168 hours.  Labs within the past 24 hours have been reviewed.    Diagnostic Results:  None

## 2019-05-07 NOTE — ASSESSMENT & PLAN NOTE
- Hx recurrent ESBL UTI, most recently treated w/ ertapenem scheduled to stop 4/16  - Given Cipro x 9 days ending 4/25  - Pt has planned urethrocystoscopy for recurrent UTIs  - UA in ED clean. Repeat UA negative on admit. Urine cx pending.

## 2019-05-07 NOTE — ASSESSMENT & PLAN NOTE
- Pt is scheduled for monthly pentamidine treatments for PCP prophylaxis (next appt 5/15/19)  - Pt enrolled in CMV prophylaxis drug study (CMV D+/R-)   - CMV PCR pending. PCR 4/29 undetected.

## 2019-05-08 ENCOUNTER — ANESTHESIA EVENT (OUTPATIENT)
Dept: ENDOSCOPY | Facility: HOSPITAL | Age: 59
DRG: 866 | End: 2019-05-08
Payer: MEDICARE

## 2019-05-08 PROBLEM — R55 NEAR SYNCOPE: Status: RESOLVED | Noted: 2019-02-04 | Resolved: 2019-05-08

## 2019-05-08 PROBLEM — D70.9 NEUTROPENIA, UNSPECIFIED: Status: ACTIVE | Noted: 2019-05-08

## 2019-05-08 PROBLEM — D69.6 THROMBOCYTOPENIA, UNSPECIFIED: Status: RESOLVED | Noted: 2019-01-17 | Resolved: 2019-05-08

## 2019-05-08 PROBLEM — E87.20 METABOLIC ACIDOSIS: Status: RESOLVED | Noted: 2019-01-15 | Resolved: 2019-05-08

## 2019-05-08 PROBLEM — N39.0 UTI (URINARY TRACT INFECTION): Status: RESOLVED | Noted: 2019-04-02 | Resolved: 2019-05-08

## 2019-05-08 PROBLEM — Z78.9 PRESENCE OF SURGICAL INCISION: Status: RESOLVED | Noted: 2019-01-15 | Resolved: 2019-05-08

## 2019-05-08 PROBLEM — B37.0 THRUSH: Status: RESOLVED | Noted: 2019-05-06 | Resolved: 2019-05-08

## 2019-05-08 LAB
ALBUMIN SERPL BCP-MCNC: 3 G/DL (ref 3.5–5.2)
ANION GAP SERPL CALC-SCNC: 6 MMOL/L (ref 8–16)
ANISOCYTOSIS BLD QL SMEAR: SLIGHT
BACTERIA UR CULT: NORMAL
BACTERIA UR CULT: NORMAL
BASOPHILS NFR BLD: 0 % (ref 0–1.9)
BUN SERPL-MCNC: 17 MG/DL (ref 8–23)
CALCIUM SERPL-MCNC: 8.9 MG/DL (ref 8.7–10.5)
CHLORIDE SERPL-SCNC: 108 MMOL/L (ref 95–110)
CMV DNA SERPL NAA+PROBE-ACNC: NORMAL IU/ML
CO2 SERPL-SCNC: 25 MMOL/L (ref 23–29)
CREAT SERPL-MCNC: 1 MG/DL (ref 0.5–1.4)
CRYPTOSP AG STL QL IA: NEGATIVE
DIFFERENTIAL METHOD: ABNORMAL
EOSINOPHIL NFR BLD: 2 % (ref 0–8)
ERYTHROCYTE [DISTWIDTH] IN BLOOD BY AUTOMATED COUNT: 15.6 % (ref 11.5–14.5)
EST. GFR  (AFRICAN AMERICAN): >60 ML/MIN/1.73 M^2
EST. GFR  (NON AFRICAN AMERICAN): >60 ML/MIN/1.73 M^2
G LAMBLIA AG STL QL IA: NEGATIVE
GLUCOSE SERPL-MCNC: 154 MG/DL (ref 70–110)
HCT VFR BLD AUTO: 26.7 % (ref 37–48.5)
HGB BLD-MCNC: 8.9 G/DL (ref 12–16)
IMM GRANULOCYTES # BLD AUTO: ABNORMAL K/UL (ref 0–0.04)
IMM GRANULOCYTES NFR BLD AUTO: ABNORMAL % (ref 0–0.5)
LYMPHOCYTES NFR BLD: 14 % (ref 18–48)
MAGNESIUM SERPL-MCNC: 1.5 MG/DL (ref 1.6–2.6)
MCH RBC QN AUTO: 30 PG (ref 27–31)
MCHC RBC AUTO-ENTMCNC: 33.3 G/DL (ref 32–36)
MCV RBC AUTO: 90 FL (ref 82–98)
METAMYELOCYTES NFR BLD MANUAL: 2 %
MONOCYTES NFR BLD: 11 % (ref 4–15)
NEUTROPHILS NFR BLD: 68 % (ref 38–73)
NEUTS BAND NFR BLD MANUAL: 3 %
NRBC BLD-RTO: 0 /100 WBC
O+P STL TRI STN: NORMAL
OVALOCYTES BLD QL SMEAR: ABNORMAL
PHOSPHATE SERPL-MCNC: 3.4 MG/DL (ref 2.7–4.5)
PLATELET # BLD AUTO: 155 K/UL (ref 150–350)
PMV BLD AUTO: 10.3 FL (ref 9.2–12.9)
POCT GLUCOSE: 138 MG/DL (ref 70–110)
POCT GLUCOSE: 154 MG/DL (ref 70–110)
POCT GLUCOSE: 154 MG/DL (ref 70–110)
POIKILOCYTOSIS BLD QL SMEAR: SLIGHT
POLYCHROMASIA BLD QL SMEAR: ABNORMAL
POTASSIUM SERPL-SCNC: 4.1 MMOL/L (ref 3.5–5.1)
RBC # BLD AUTO: 2.97 M/UL (ref 4–5.4)
RV AG STL QL IA.RAPID: NEGATIVE
SODIUM SERPL-SCNC: 139 MMOL/L (ref 136–145)
TACROLIMUS BLD-MCNC: 7.4 NG/ML (ref 5–15)
WBC # BLD AUTO: 1.13 K/UL (ref 3.9–12.7)

## 2019-05-08 PROCEDURE — 99233 PR SUBSEQUENT HOSPITAL CARE,LEVL III: ICD-10-PCS | Mod: GC,,, | Performed by: INTERNAL MEDICINE

## 2019-05-08 PROCEDURE — 99233 SBSQ HOSP IP/OBS HIGH 50: CPT | Mod: GC,,, | Performed by: INTERNAL MEDICINE

## 2019-05-08 PROCEDURE — 25000003 PHARM REV CODE 250: Performed by: INTERNAL MEDICINE

## 2019-05-08 PROCEDURE — 99222 PR INITIAL HOSPITAL CARE,LEVL II: ICD-10-PCS | Mod: ,,, | Performed by: INTERNAL MEDICINE

## 2019-05-08 PROCEDURE — 63600175 PHARM REV CODE 636 W HCPCS: Performed by: NURSE PRACTITIONER

## 2019-05-08 PROCEDURE — 86352 CELL FUNCTION ASSAY W/STIM: CPT

## 2019-05-08 PROCEDURE — 99232 PR SUBSEQUENT HOSPITAL CARE,LEVL II: ICD-10-PCS | Mod: ,,, | Performed by: NURSE PRACTITIONER

## 2019-05-08 PROCEDURE — 85027 COMPLETE CBC AUTOMATED: CPT

## 2019-05-08 PROCEDURE — 99232 SBSQ HOSP IP/OBS MODERATE 35: CPT | Mod: ,,, | Performed by: NURSE PRACTITIONER

## 2019-05-08 PROCEDURE — 80197 ASSAY OF TACROLIMUS: CPT

## 2019-05-08 PROCEDURE — 80069 RENAL FUNCTION PANEL: CPT

## 2019-05-08 PROCEDURE — 36415 COLL VENOUS BLD VENIPUNCTURE: CPT

## 2019-05-08 PROCEDURE — 63600175 PHARM REV CODE 636 W HCPCS: Performed by: PHYSICIAN ASSISTANT

## 2019-05-08 PROCEDURE — 63600175 PHARM REV CODE 636 W HCPCS: Mod: JG | Performed by: INTERNAL MEDICINE

## 2019-05-08 PROCEDURE — S5571 INSULIN DISPOS PEN 3 ML: HCPCS | Performed by: NURSE PRACTITIONER

## 2019-05-08 PROCEDURE — 99233 PR SUBSEQUENT HOSPITAL CARE,LEVL III: ICD-10-PCS | Mod: ,,, | Performed by: PHYSICIAN ASSISTANT

## 2019-05-08 PROCEDURE — 25000003 PHARM REV CODE 250: Performed by: PHYSICIAN ASSISTANT

## 2019-05-08 PROCEDURE — 99233 SBSQ HOSP IP/OBS HIGH 50: CPT | Mod: ,,, | Performed by: PHYSICIAN ASSISTANT

## 2019-05-08 PROCEDURE — 83735 ASSAY OF MAGNESIUM: CPT

## 2019-05-08 PROCEDURE — 99222 1ST HOSP IP/OBS MODERATE 55: CPT | Mod: ,,, | Performed by: INTERNAL MEDICINE

## 2019-05-08 PROCEDURE — 85007 BL SMEAR W/DIFF WBC COUNT: CPT

## 2019-05-08 PROCEDURE — 20600001 HC STEP DOWN PRIVATE ROOM

## 2019-05-08 PROCEDURE — 25000003 PHARM REV CODE 250: Performed by: NURSE PRACTITIONER

## 2019-05-08 RX ORDER — HYDRALAZINE HYDROCHLORIDE 20 MG/ML
10 INJECTION INTRAMUSCULAR; INTRAVENOUS EVERY 6 HOURS PRN
Status: DISCONTINUED | OUTPATIENT
Start: 2019-05-08 | End: 2019-05-10 | Stop reason: HOSPADM

## 2019-05-08 RX ORDER — POLYETHYLENE GLYCOL 3350, SODIUM SULFATE ANHYDROUS, SODIUM BICARBONATE, SODIUM CHLORIDE, POTASSIUM CHLORIDE 236; 22.74; 6.74; 5.86; 2.97 G/4L; G/4L; G/4L; G/4L; G/4L
4000 POWDER, FOR SOLUTION ORAL ONCE
Status: COMPLETED | OUTPATIENT
Start: 2019-05-08 | End: 2019-05-08

## 2019-05-08 RX ORDER — CALCIUM CARBONATE 200(500)MG
500 TABLET,CHEWABLE ORAL 2 TIMES DAILY PRN
Status: DISCONTINUED | OUTPATIENT
Start: 2019-05-08 | End: 2019-05-10 | Stop reason: HOSPADM

## 2019-05-08 RX ADMIN — CALCIUM CARBONATE (ANTACID) CHEW TAB 500 MG 500 MG: 500 CHEW TAB at 08:05

## 2019-05-08 RX ADMIN — HEPARIN SODIUM 5000 UNITS: 5000 INJECTION, SOLUTION INTRAVENOUS; SUBCUTANEOUS at 02:05

## 2019-05-08 RX ADMIN — HEPARIN SODIUM 5000 UNITS: 5000 INJECTION, SOLUTION INTRAVENOUS; SUBCUTANEOUS at 05:05

## 2019-05-08 RX ADMIN — HUMAN IMMUNOGLOBULIN G 95 G: 20 LIQUID INTRAVENOUS at 01:05

## 2019-05-08 RX ADMIN — HYDRALAZINE HYDROCHLORIDE 5 MG: 20 INJECTION INTRAMUSCULAR; INTRAVENOUS at 05:05

## 2019-05-08 RX ADMIN — TACROLIMUS 1 MG: 1 CAPSULE ORAL at 08:05

## 2019-05-08 RX ADMIN — ROSUVASTATIN CALCIUM 10 MG: 10 TABLET, FILM COATED ORAL at 08:05

## 2019-05-08 RX ADMIN — FAMOTIDINE 20 MG: 20 TABLET, FILM COATED ORAL at 08:05

## 2019-05-08 RX ADMIN — HYDRALAZINE HYDROCHLORIDE 10 MG: 20 INJECTION INTRAMUSCULAR; INTRAVENOUS at 11:05

## 2019-05-08 RX ADMIN — LEVOTHYROXINE SODIUM 75 MCG: 25 TABLET ORAL at 08:05

## 2019-05-08 RX ADMIN — TACROLIMUS 1 MG: 1 CAPSULE ORAL at 05:05

## 2019-05-08 RX ADMIN — OXYBUTYNIN CHLORIDE 10 MG: 10 TABLET, EXTENDED RELEASE ORAL at 08:05

## 2019-05-08 RX ADMIN — ACETAMINOPHEN 650 MG: 325 TABLET ORAL at 01:05

## 2019-05-08 RX ADMIN — THERA TABS 1 TABLET: TAB at 08:05

## 2019-05-08 RX ADMIN — HYDRALAZINE HYDROCHLORIDE 10 MG: 20 INJECTION INTRAMUSCULAR; INTRAVENOUS at 08:05

## 2019-05-08 RX ADMIN — MAGNESIUM OXIDE TAB 400 MG (241.3 MG ELEMENTAL MG) 800 MG: 400 (241.3 MG) TAB at 08:05

## 2019-05-08 RX ADMIN — DIPHENHYDRAMINE HYDROCHLORIDE 25 MG: 50 INJECTION INTRAMUSCULAR; INTRAVENOUS at 01:05

## 2019-05-08 RX ADMIN — INSULIN DETEMIR 24 UNITS: 100 INJECTION, SOLUTION SUBCUTANEOUS at 08:05

## 2019-05-08 RX ADMIN — HEPARIN SODIUM 5000 UNITS: 5000 INJECTION, SOLUTION INTRAVENOUS; SUBCUTANEOUS at 08:05

## 2019-05-08 RX ADMIN — VILAZODONE HYDROCHLORIDE 40 MG: 10 TABLET ORAL at 08:05

## 2019-05-08 RX ADMIN — POLYETHYLENE GLYCOL 3350, SODIUM SULFATE ANHYDROUS, SODIUM BICARBONATE, SODIUM CHLORIDE, POTASSIUM CHLORIDE 4000 ML: 236; 22.74; 6.74; 5.86; 2.97 POWDER, FOR SOLUTION ORAL at 06:05

## 2019-05-08 NOTE — ASSESSMENT & PLAN NOTE
- Continue Prograf. Monitor trough and adjust level as needed to achieve therapeutic dose.  - Cellcept on hold 2/2 recurrent infection and neutropenia.

## 2019-05-08 NOTE — ASSESSMENT & PLAN NOTE
-WBC trending down despite being off cellcept, CMV treatment, and bactrim  -, will give dose of neupogen today  -heme/onc consulted for further assistance, appreciate recs

## 2019-05-08 NOTE — HPI
Ms Newell is a 59 year old female with history of ESRD s/p transplant (1/14/19, CMV D+/R-), HTN, DM, who is presenting to the hospital with fever, n/v/d; Gi consulted due to concern for CMV.    She underwent transplant on 1/14/19 with post-transplant period notable for readmissions due to recurrent ESBL (most recently treated with ertapenem and cipro, end 4/25). Presented to OSH on 5/6 due to fever and nausea and vomitus. Labs notable for anemia (hgb 8 -> 6.9), leukopenia (1.1 on 5/8). CTAP (5/7) non-contrast: unremarkable. She was seen by ID and recommended for IVIG (for empiric treatment of parvovirus) and consideration of CMV (is on a CMV treatment study).    On history she reports she has had ongoing diarrhea for ~2 months. Denies any issues with diarrhea preceding transplant or any obvious changes in routine/medications/etc prior to onset of her diarrhea. Denies any abdominal pain, n/v at that time. Notes diarrhea was all day, generally 7BM/day, and up to 3-4BM/night. Diarrhea is non-bloody. Is improved with imodium but only transient relief (will cause constipation x2 days then one formed stool then resolution of diarrhea).    She presented to the hospital due to new onset nausea and non-bloody, bilious vomitus since Sunday (vomitus ~6/day) and associated fever. Currently nausea improved since hospitalization. Afebrile since admission. 5 episodes of diarrhea on 5/7, 2 thusfar on 5/8.    Workup thus far:  - c.diff antigen +ve, toxin negative (5/7)  - stool culture pending  - e. Coli pending  - CMV pending (negative on 4/29/19)  - Parvovirus pending  - BK Virus pending  - GI pathogen pending  - acute hep negative  - norovirus, rotovirus pending  - giardia pending    Prior Endo Hx  Colonoscopy. (9/2017) Provider: Dr. AYLIN Bryson. Indication: CRC Screening. Extent: Cecum. Preparation:Adequate.  - 3x 3-6mm polyps in recto-sigmoid colon

## 2019-05-08 NOTE — PLAN OF CARE
Problem: Adult Inpatient Plan of Care  Goal: Plan of Care Review  -AAOx4, VSS-- BP elevated tonight, refer to flowsheet for vitals trend. Notified Lidia LONG, 5mg IV hydralazine ordered + administered. BP slightly improved but still remains elevated from baseline; pt remains asymptomatic, -remains afebrile overnight. IVIG administered   c-diff negative + other stool studies pending as well as CMV PCR + UC  CT of abdomen/pelvis completed. Results pending at this time.  1U PRBC given today for H/H=7.6 + 23.4  Bed in lowest position, non skid socks worn, call light within reach. Will cont to monitor.

## 2019-05-08 NOTE — ASSESSMENT & PLAN NOTE
- Cdiff Ag+, PCR neg   - Stool studies in process  - GI consulted, CLD today for colonoscopy tomorrow

## 2019-05-08 NOTE — PLAN OF CARE
Problem: Adult Inpatient Plan of Care  Goal: Plan of Care Review  Outcome: Ongoing (interventions implemented as appropriate)  VSS during the day. Pt had a critical WBC of 1.13. Hematology and oncology was consulted. Pt is on a clear liquid diet but will be NPO for an EDG tomorrow. Pt is to drink golytely.tonight and in the morning. PT will evaluated her for a home walker. Pt is receiving IVIG. No issues. WCTM.

## 2019-05-08 NOTE — SUBJECTIVE & OBJECTIVE
Interval History: Feels a bit better today. Still has loose stools and leukopenia. Fevers improved. LFT's pending.    Review of Systems   Constitutional: Positive for appetite change. Negative for activity change, chills, diaphoresis and fever.   HENT: Negative for congestion, sinus pressure, sinus pain and trouble swallowing.    Respiratory: Negative for cough, chest tightness and shortness of breath.    Cardiovascular: Negative for chest pain, palpitations and leg swelling.   Gastrointestinal: Positive for diarrhea. Negative for abdominal distention, abdominal pain, constipation, nausea and vomiting.   Genitourinary: Negative for difficulty urinating, dysuria, flank pain and urgency.   Musculoskeletal: Negative for neck pain and neck stiffness.   Skin: Negative for color change, pallor and rash.   Allergic/Immunologic: Positive for immunocompromised state.   Neurological: Negative for dizziness, tremors and light-headedness.   Psychiatric/Behavioral: Negative for behavioral problems, decreased concentration and sleep disturbance. The patient is not nervous/anxious.      Objective:     Vital Signs (Most Recent):  Temp: 98.3 °F (36.8 °C) (05/08/19 1154)  Pulse: (!) 59 (05/08/19 1416)  Resp: 18 (05/08/19 1416)  BP: (!) 170/73 (05/08/19 1416)  SpO2: 96 % (05/08/19 1416) Vital Signs (24h Range):  Temp:  [97.8 °F (36.6 °C)-98.6 °F (37 °C)] 98.3 °F (36.8 °C)  Pulse:  [52-89] 59  Resp:  [17-20] 18  SpO2:  [93 %-100 %] 96 %  BP: (137-198)/(64-82) 170/73     Weight: 94.7 kg (208 lb 12.4 oz)  Body mass index is 32.7 kg/m².    Estimated Creatinine Clearance: 69.8 mL/min (based on SCr of 1 mg/dL).    Physical Exam   Constitutional: She is oriented to person, place, and time. She appears well-developed. No distress.   HENT:   Head: Normocephalic and atraumatic.   Eyes: Right eye exhibits no discharge. Left eye exhibits no discharge. No scleral icterus.   Neck: Normal range of motion.   Cardiovascular: Normal rate, regular  rhythm, normal heart sounds and intact distal pulses.   Pulmonary/Chest: Effort normal and breath sounds normal. She has no wheezes. She has no rales.   Abdominal: Soft. Bowel sounds are normal. She exhibits no distension. There is no tenderness.   Well healed kidney incision   Musculoskeletal: She exhibits no edema.   Neurological: She is alert and oriented to person, place, and time.   Skin: Skin is warm and dry. She is not diaphoretic. No erythema.   Psychiatric: She has a normal mood and affect. Her behavior is normal. Judgment and thought content normal.   Nursing note and vitals reviewed.      Significant Labs:   CBC:   Recent Labs   Lab 05/07/19  0111 05/07/19  0754 05/08/19  0633   WBC 2.82* 2.66* 1.13*   HGB 6.4* 7.6* 8.9*   HCT 20.4* 23.4* 26.7*    150 155     CMP:   Recent Labs   Lab 05/07/19  0111 05/07/19  0754 05/08/19  0633    142 139   K 4.2 4.7 4.1    109 108   CO2 24 25 25   * 130* 154*   BUN 14 13 17   CREATININE 1.1 1.1 1.0   CALCIUM 8.8 8.9 8.9   PROT 6.1  --   --    ALBUMIN 3.2* 3.2* 3.0*   BILITOT 0.5  --   --    ALKPHOS 161*  --   --    AST 42*  --   --    ALT 46*  --   --    ANIONGAP 8 8 6*   EGFRNONAA 54.3* 54.3* >60.0       Significant Imaging: I have reviewed all pertinent imaging results/findings within the past 24 hours.

## 2019-05-08 NOTE — SUBJECTIVE & OBJECTIVE
"Interval HPI:   Overnight events:    BG is within goal on SQ insulin regimen. NAEON.     Eating:   <25%  Nausea: No  Hypoglycemia and intervention: No  Fever: No  TPN and/or TF: No  If yes, type of TF/TPN and rate: None    BP (!) 151/68 (Patient Position: Standing)   Pulse 89   Temp 98.5 °F (36.9 °C) (Oral)   Resp 18   Ht 5' 7" (1.702 m)   Wt 94.7 kg (208 lb 12.4 oz)   LMP 02/28/2012   SpO2 100%   Breastfeeding? No   BMI 32.70 kg/m²     Labs Reviewed and Include    Recent Labs   Lab 05/08/19  0633   *   CALCIUM 8.9   ALBUMIN 3.0*      K 4.1   CO2 25      BUN 17   CREATININE 1.0     Lab Results   Component Value Date    WBC 1.13 (LL) 05/08/2019    HGB 8.9 (L) 05/08/2019    HCT 26.7 (L) 05/08/2019    MCV 90 05/08/2019     05/08/2019     No results for input(s): TSH, FREET4 in the last 168 hours.  Lab Results   Component Value Date    HGBA1C 8.9 (H) 05/07/2019    HGBA1C 8.9 (H) 05/07/2019       Nutritional status:   Body mass index is 32.7 kg/m².  Lab Results   Component Value Date    ALBUMIN 3.0 (L) 05/08/2019    ALBUMIN 3.2 (L) 05/07/2019    ALBUMIN 3.2 (L) 05/07/2019     No results found for: PREALBUMIN    Estimated Creatinine Clearance: 69.8 mL/min (based on SCr of 1 mg/dL).    Accu-Checks  Recent Labs     05/06/19  2122 05/06/19  2255 05/07/19  0812 05/07/19  1122 05/07/19  1652 05/07/19  2219   POCTGLUCOSE 175* 161* 141* 171* 157* 183*       Current Medications and/or Treatments Impacting Glycemic Control  Immunotherapy:    Immunosuppressants         Stop Route Frequency     tacrolimus capsule 1 mg      -- Oral 2 times daily        Steroids:   Hormones (From admission, onward)    None        Pressors:    Autonomic Drugs (From admission, onward)    None        Hyperglycemia/Diabetes Medications:   Antihyperglycemics (From admission, onward)    Start     Stop Route Frequency Ordered    05/07/19 2100  insulin detemir U-100 pen 24 Units      -- SubQ Nightly 05/07/19 1038    05/07/19 " 1145  insulin aspart U-100 pen 5 Units      -- SubQ 3 times daily with meals 05/07/19 1038    05/07/19 1138  insulin aspart U-100 pen 1-10 Units      -- SubQ Before meals & nightly PRN 05/07/19 1038

## 2019-05-08 NOTE — SUBJECTIVE & OBJECTIVE
Subjective:   History of Present Illness:  Ms. Newell is a 58 y/o F with PMHx of chronic back pain, anemia, and DM/HTN nephropathy ESRD now s/p living kidney transplant 1/14/2019 (Campath induction, CMV D+/R). Her post op course has been significant for multiple readmissions 2/2 fatigue, weakness, BOYER, and recurrent ESBL Klebsiella UTIs (most recently treated with 5 days of ertapenem to end 4/16 then started on Cipro x 9 days ending 4/25).  Pt admitted as a transfer from [facility] where she presented early AM 5/6 due to n/v this AM and fevers x several days. Tmax in the .5. Infectious workup initiated in ED - Blood cx NGTD. UA negative for infection. Will repeat UA and cx as pt has hx recurrent UTIs. ID consulted (pt had appt w/ Dr Kessler scheduled for 5/7 due to fevers over the weekend). [facility] labs significant for anemia (H/H 6.9/21.0). Pt has hx chronic anemia of indeterminate etiology. Will repeat CBC and check iron studies, folate, B12, LDH, retic, haptoglobin, and Parvovirus PCR. Graft function stable with Cr 1.3 this AM and good UOP.     Ms. Newell is a 59 y.o. year old female who is status post Kidney Transplant - 1/14/2019  (#1).    Her maintenance immunosuppression consists of:   Immunosuppressants (From admission, onward)    Start     Stop Route Frequency Ordered    05/07/19 0945  tacrolimus capsule 1 mg      -- Oral 2 times daily 05/07/19 0838          Interval history:   Pt with elevated BP overnight, slightly improved with PRN hydralazine 5 mg x 2. BP elevated again this AM - SBP 190s. Will give 10 mg hydralazine and monitor. Pain controlled and dose appear significantly volume overloaded. On exam this morning, patient states she is feeling better today. Afebrile > 24 hours. Continues with diarrhea, had 7 episodes over the last 24 hours. Cdiff Ag + but AB and PCR neg - no need to treat. Stool studies in process. GI consulted this morning, will plan for colonoscopy tomorrow to rule  out invasive disease. CT a/p unremarkable for acute pathology. Parvovirus PCR qual positive 4/15/19. ID on board. Repeat Parvo PCR in process. Received 1st treatment of IVIG last night. Will get second round of treatment today. Received 2 units of PRBC yesterday and H&H responded appropriately. WBC trending down despite being cellcept, CMV treatment, and bactrim. WBC 1.13 and . Will give neupogen today and consult Heme/onc for further assistance.        Past Medical, Surgical, Family, and Social History:   Unchanged from H&P.    Scheduled Meds:   ergocalciferol  50,000 Units Oral Q7 Days    famotidine  20 mg Oral QHS    heparin (porcine)  5,000 Units Subcutaneous Q8H    Immune Globulin G (IGG)-PRO-IGA 10 % injection (Privigen)  1,000 mg/kg Intravenous Q24H    insulin aspart U-100  5 Units Subcutaneous TIDWM    insulin detemir U-100  24 Units Subcutaneous QHS    levothyroxine  75 mcg Oral Daily    magnesium oxide  800 mg Oral Daily    multivitamin  1 tablet Oral Daily    oxybutynin  10 mg Oral Daily    rosuvastatin  10 mg Oral Daily    tacrolimus  1 mg Oral BID    tbo-filgrastim  480 mcg Subcutaneous Once    vilazodone  40 mg Oral Daily     Continuous Infusions:  PRN Meds:acetaminophen, aluminum-magnesium hydroxide-simethicone, dextrose 50%, dextrose 50%, diphenhydrAMINE, glucagon (human recombinant), glucose, glucose, hydrALAZINE, insulin aspart U-100, LORazepam, ondansetron, promethazine (PHENERGAN) IVPB, sodium chloride 0.9%    Intake/Output - Last 3 Shifts       05/06 0700 - 05/07 0659 05/07 0700 - 05/08 0659 05/08 0700 - 05/09 0659    P.O. 0 1310     Blood 306.3  1622.5    Total Intake(mL/kg) 306.3 (3.2) 1310 (13.8) 1622.5 (17.1)    Urine (mL/kg/hr)  2250 (1)     Emesis/NG output  0     Other  0     Stool  0     Blood  0     Total Output  2250     Net +306.3 -940 +1622.5           Urine Occurrence 1 x 3 x     Stool Occurrence 0 x 5 x 2 x    Emesis Occurrence  0 x            Review of Systems  "  Constitutional: Positive for appetite change. Negative for activity change, chills, diaphoresis and fever.   HENT: Negative for congestion, sinus pressure, sinus pain and trouble swallowing.    Respiratory: Negative for cough, chest tightness and shortness of breath.    Cardiovascular: Negative for chest pain, palpitations and leg swelling.   Gastrointestinal: Positive for diarrhea. Negative for abdominal distention, abdominal pain, constipation, nausea and vomiting.   Genitourinary: Negative for difficulty urinating, dysuria, flank pain and urgency.   Musculoskeletal: Negative for neck pain and neck stiffness.   Skin: Negative for color change, pallor and rash.   Allergic/Immunologic: Positive for immunocompromised state.   Neurological: Negative for dizziness, tremors and light-headedness.   Psychiatric/Behavioral: Negative for behavioral problems, decreased concentration and sleep disturbance. The patient is not nervous/anxious.       Objective:     Vital Signs (Most Recent):  Temp: 98.3 °F (36.8 °C) (05/08/19 1154)  Pulse: 64 (05/08/19 1242)  Resp: 18 (05/08/19 1242)  BP: (!) 167/68 (05/08/19 1242)  SpO2: 96 % (05/08/19 1242) Vital Signs (24h Range):  Temp:  [97.8 °F (36.6 °C)-98.6 °F (37 °C)] 98.3 °F (36.8 °C)  Pulse:  [52-89] 64  Resp:  [17-20] 18  SpO2:  [93 %-100 %] 96 %  BP: (137-198)/(64-82) 167/68     Weight: 94.7 kg (208 lb 12.4 oz)  Height: 5' 7" (170.2 cm)  Body mass index is 32.7 kg/m².    Physical Exam   Constitutional: She is oriented to person, place, and time. She appears well-developed. No distress.   HENT:   Head: Normocephalic and atraumatic.   Eyes: Right eye exhibits no discharge. Left eye exhibits no discharge. No scleral icterus.   Neck: Normal range of motion.   Cardiovascular: Normal rate, regular rhythm, normal heart sounds and intact distal pulses.   Pulmonary/Chest: Effort normal and breath sounds normal. She has no wheezes. She has no rales.   Abdominal: Soft. Bowel sounds are " normal. She exhibits no distension. There is no tenderness.   Well healed kidney incision   Musculoskeletal: She exhibits no edema.   Neurological: She is alert and oriented to person, place, and time.   Skin: Skin is warm and dry. She is not diaphoretic. No erythema.   Psychiatric: She has a normal mood and affect. Her behavior is normal. Judgment and thought content normal.   Nursing note and vitals reviewed.      Laboratory:  CBC:   Recent Labs   Lab 05/07/19  0111 05/07/19  0754 05/08/19  0633   WBC 2.82* 2.66* 1.13*   RBC 2.17* 2.58* 2.97*   HGB 6.4* 7.6* 8.9*   HCT 20.4* 23.4* 26.7*    150 155   MCV 94 91 90   MCH 29.5 29.5 30.0   MCHC 31.4* 32.5 33.3     CMP:   Recent Labs   Lab 05/06/19  0355 05/07/19  0111 05/07/19 0754 05/08/19  0633   * 156* 130* 154*   CALCIUM 9.0 8.8 8.9 8.9   ALBUMIN 3.7 3.2* 3.2* 3.0*   PROT 6.6 6.1  --   --     141 142 139   K 4.2 4.2 4.7 4.1   CO2 21* 24 25 25    109 109 108   BUN 13 14 13 17   CREATININE 1.3 1.1 1.1 1.0   ALKPHOS 195* 161*  --   --    ALT 60* 46*  --   --    AST 60* 42*  --   --      Labs within the past 24 hours have been reviewed.    Diagnostic Results:  All imaging with in the past 24 hours has been reviewed.

## 2019-05-08 NOTE — ASSESSMENT & PLAN NOTE
Ms Newell is a 59 year old female with history of ESRD s/p transplant (1/14/19, CMV D+/R-), HTN, DM, who is presenting to the hospital with fever, n/v/d; Gi consulted due to concern for CMV.    Unclear the cause of her diarrhea. Onset was ~2 months ago and has had negative CMV titers though cannot definitively exclude CMV colitis. Has had infectious workup (some currently pending) but negative thusfar. CT was unremarkable.    Plan  - clear liquid diet, prep today  - colonoscopy tomorrow to evaluate  - EGD tomorrow due to concern for diarrhea and new nausea and vomiting  - plan discussed with primary team

## 2019-05-08 NOTE — ASSESSMENT & PLAN NOTE
"- Admitted as transfer from [facility] ED w/ recurrent fever, Tmax 101.5 at previous hospital.   - [facility] Blood cx 5/6 NGTD  - UA clean in ED, repeat also neg. Urine cx pending as pt has hx recurrent UTIs (ESBL)  - Graft fx stable; Lactate wnl; LFTs slightly elevated, likely 2/2 infection.   - CXR 5/6: "Borderline heart size. No intrapulmonary masses or infiltrates are seen. No pneumothorax or pleural effusion is noted."  - ID consulted, appreciate recs. Stool studies sent. Acute hepatitis panel negative.   - Parvo PCR qual 4/15 positive. Started IVIG on 5/7 per ID recs. Will get 2nd dose today. Repeat PCR in process.  - CMV PCR, and BK virus pending  - Consulted GI for c-scope/biopsy to rule out invasive tissue disease   "

## 2019-05-08 NOTE — ASSESSMENT & PLAN NOTE
"- S/p living kidney transplant 1/14/2019 2/2 DM/HTN (Campath induction, CMV D+/R).   - Post op course significant for multiple readmissions 2/2 fatigue, weakness, BOYER, and recurrent ESBL Klebsiella UTIs   - Now admitted as transfer from [facility] ED for fever (Tmax 101.5 today) and n/v   - See "fever."   - Last Allograft US 4/12/19 "upper normal to slightly elevated renal allograft resistive indices. peritransplant fluid collection, unchanged."  - Graft fx stable and adequate UOP.  - Continue strict Is and Os.  - Continue daily RFP.   "

## 2019-05-08 NOTE — CONSULTS
Consult Note    Inpatient consult to Hematology/Oncology  Consult performed by: Tana Neumann MD  Consult ordered by: Darby Rader PA-C        SUBJECTIVE:     History of Present Illness:  Patient is a 59 y.o. female with PMhx of ESRD (s/p living donor transplant in in 1/14/19), recurrent UTIs (ESBL w Klebsiella) DMII, HTN, chronic anemia who presents to the hospital due to feeling weak and having a fever up to 101 F on Sunday, w one episode of vomiting the same day which prompted further evaluation. The pt has also had chronic diarrhea now for about two months as well.  She states that fatigue has been worsening since transplant and causing debillity. The pt has had EBV, CMV, and parvovirus testing in the past month, for which the one which came back positive so far was a parvovirus qual DNA PCR test completed on 4/15. The pt denies any recent bleeding or bruising. She is also on tacrolimus 1 mg BID currently.     Given recurrent anemia and also parovirus study being positive, the pt is currently receiving IVIG through infectious disease to help treat this disease. A parvovirus quant PCR is pending. Otherwise blood and urine cultures have been negative at this time. There is a plan to check an EGD and colonscopy tomorrow to further evaluate GI symptoms of vomiting and diarrhea further, in order to r/o CMV as a cause.     Her Hemoglobin has ranged 6.9 initially during this admission to 8.9 today. She has received 2 U of pRBC in the past two days. Her ANC today is calculated to be 768. Overall, the pt's white count has trended down to 1.1 today but has ranged from 1.1 - 11.7 in the time period after her trasnplant. Plts are stable at 155 K, having mostly ranging up to about 250 K on most lab draws. She denies any current bleeding or bruising from any known body source at this time.     She lives in [place]. She has a son who seems to possible have lung cancer and a half sister with leukemia. The pt has not  had a history of low blood counts, infections prior to transplant, or prior blood/bone marrow disorders.    Review of patient's allergies indicates:   Allergen Reactions    Torsemide Diarrhea     Diarrhea stopped once discontinued med    Codeine Itching     Can take oxycodone and hydrocodone with Benadryl     Past Medical History:   Diagnosis Date    Anemia     Anemia in chronic kidney disease, on chronic dialysis 7/12/2017    Arthritis     Asthma     allergic airway    CKD stage III (moderate) 2/18/2019    Colon polyp     Depression     Diabetes mellitus     Diverticulosis     Eosinophilia     ESRD (end stage renal disease)     stage V, due for living donor 9/2018    ESRD on dialysis     GERD (gastroesophageal reflux disease)     Gout     Gout     Hyperlipidemia     Hypertension     Low back pain     MRSA carrier     Obesity     Renal disorder     Rotator cuff syndrome--left     Thyroid disease     Ulnar neuropathy of right upper extremity     Uncontrolled type 2 diabetes mellitus with hyperglycemia      Past Surgical History:   Procedure Laterality Date    ACHILLES TENDON SURGERY Left May 2015    ARTHROSCOPY, HIP Left 10/3/2013    [facility]    ARTHROSCOPY-HIP WITH TROCHANTERIC BURSECTOMY Left 10/3/2013    [facility] OR    ARTHROSCOPY-SHOULDER Left 11/24/2015    [facility] OR    ARTHROSCOPY-SHOULDER WITH SUBACROMIAL DECOMPRESSION Left 7/12/2016    [facility] OR    BACK SURGERY      CHOLECYSTECTOMY      COLONOSCOPY N/A 9/6/2017    [facility]O    DEBRIDEMENT-SHOULDER-ARTHROSCOPIC Left 7/12/2016    [facility] OR    DEBRIDEMENT-SHOULDER-ARTHROSCOPIC; LABRAL Left 11/24/2015    [facility] OR    DIALYSIS FISTULA CREATION  12/2017    FEMOROPLASTY, ARTHROSCOPIC Left 10/3/2013     [facility] OR    HEMORRHOID SURGERY      INJECTION-AMNIOX Left 7/12/2016    [facility] OR    JOINT REPLACEMENT      left    KNEE SURGERY      LYSIS-ADHESION Left 11/24/2015    [facility] OR    REPAIR ROTATOR CUFF ARTHROSCOPIC Left 7/12/2016    [facility] OR    REPAIR-TENDON-BICEP Left 11/24/2015    [facility] OR    SHOULDER ARTHROSCOPY      SHOULDER SURGERY      TRANSPLANT, KIDNEY N/A 1/14/2019    [facility] OR 2ND F[facility]    UPPER GASTROINTESTINAL ENDOSCOPY  ~2013    Dr. Moralez     Family History   Problem Relation Age of Onset    Diabetes Mother     Alzheimer's disease Mother     Thyroid disease Mother     Hyperlipidemia Mother     Heart disease Father     Stroke Father     Diabetes Sister     Lupus Sister     Ovarian cancer Sister     Heart disease Brother     No Known Problems Son     Diabetes Maternal Grandmother     Hypertension Maternal Grandmother     No Known Problems Paternal Grandmother     No Known Problems Paternal Grandfather     COPD Maternal Aunt     Diabetes Maternal Aunt     Heart disease Maternal Aunt     Hypertension Maternal Aunt     No Known Problems Maternal Uncle     No Known Problems Paternal Aunt     No Known Problems Paternal Uncle     Colon cancer Neg Hx     Colon polyps Neg Hx     Crohn's disease Neg Hx     Ulcerative colitis Neg Hx     Celiac disease Neg Hx      Social History     Tobacco Use    Smoking status: Never Smoker    Smokeless tobacco: Never Used   Substance Use Topics    Alcohol use: No    Drug use: No     Review of Systems   Constitutional: Positive for fever and malaise/fatigue.   Respiratory: Negative for shortness of breath.    Cardiovascular: Negative for chest pain and palpitations.   Gastrointestinal: Positive for diarrhea and nausea. Negative for abdominal  pain, blood in stool and melena.   Genitourinary: Negative for dysuria.   Musculoskeletal: Negative for falls.   Neurological: Positive for weakness. Negative for headaches.   Endo/Heme/Allergies: Does not bruise/bleed easily.     OBJECTIVE:     Vital Signs:  Temp:  [98.3 °F (36.8 °C)-98.5 °F (36.9 °C)]   Pulse:  [53-89]   Resp:  [17-18]   BP: (147-193)/(67-82)   SpO2:  [95 %-100 %]     Physical Exam   Constitutional: She is oriented to person, place, and time. She appears well-developed and well-nourished.   Obese female   HENT:   Head: Normocephalic and atraumatic.   Eyes: Pupils are equal, round, and reactive to light. Conjunctivae and EOM are normal.   Neck: Normal range of motion. Neck supple.   Cardiovascular: Normal rate, regular rhythm and normal heart sounds. Exam reveals no gallop and no friction rub.   No murmur heard.  Pulmonary/Chest: Effort normal and breath sounds normal. No stridor. She has no wheezes. She has no rales.   Abdominal: Soft. Bowel sounds are normal. She exhibits no distension. There is no tenderness. There is no guarding.   Prior scar from kidney transplant surgery   Musculoskeletal: Normal range of motion. She exhibits no edema.   Neurological: She is alert and oriented to person, place, and time. No cranial nerve deficit.   Skin: Skin is warm and dry. No rash noted. She is not diaphoretic.     Laboratory:  CBC:   Recent Labs   Lab 05/08/19  0633   WBC 1.13*   RBC 2.97*   HGB 8.9*   HCT 26.7*      MCV 90   MCH 30.0   MCHC 33.3     BMP:   Recent Labs   Lab 05/08/19  0633   *      K 4.1      CO2 25   BUN 17   CREATININE 1.0   CALCIUM 8.9   MG 1.5*     CMP:   Recent Labs   Lab 05/07/19  0111  05/08/19  0633   *   < > 154*   CALCIUM 8.8   < > 8.9   ALBUMIN 3.2*   < > 3.0*   PROT 6.1  --   --       < > 139   K 4.2   < > 4.1   CO2 24   < > 25      < > 108   BUN 14   < > 17   CREATININE 1.1   < > 1.0   ALKPHOS 161*  --   --    ALT 46*  --   --     AST 42*  --   --    BILITOT 0.5  --   --     < > = values in this interval not displayed.     LFTs:   Recent Labs   Lab 05/07/19  0111  05/08/19  0633   ALT 46*  --   --    AST 42*  --   --    ALKPHOS 161*  --   --    BILITOT 0.5  --   --    PROT 6.1  --   --    ALBUMIN 3.2*   < > 3.0*    < > = values in this interval not displayed.     Coagulation:   Recent Labs   Lab 05/07/19  0754   LABPROT 10.9   INR 1.1     Cardiac markers: No results for input(s): CKMB, CPKMB, TROPONINT, TROPONINI, MYOGLOBIN in the last 168 hours.  ABGs: No results for input(s): PH, PCO2, PO2, HCO3, POCSATURATED, BE in the last 168 hours.  Microbiology Results (last 7 days)     Procedure Component Value Units Date/Time    Urine Culture High Risk [[id number]] Collected:  05/07/19 0333    Order Status:  Completed Specimen:  Urine, Clean Catch Updated:  05/08/19 0954     Urine Culture, Routine Multiple organisms isolated. None in predominance.  Repeat if     Urine Culture, Routine clinically necessary.    Narrative:       Indicated criteria for high risk culture:->Other  Other (specify):->s/p kidney transplant \R\3 months ago,  recurrent UTIs    C Diff Toxin by PCR [[id number]] Collected:  05/07/19 1239    Order Status:  Completed Updated:  05/07/19 2315     C. diff PCR Negative    Clostridium difficile EIA [[id number]]  (Abnormal) Collected:  05/07/19 1239    Order Status:  Completed Specimen:  Stool Updated:  05/07/19 2158     C. diff Antigen Positive     C difficile Toxins A+B, EIA Negative     Comment: Testing not recommended for children <24 months old.       E. coli 0157 antigen [[id number]] Collected:  05/07/19 1233    Order Status:  No result Specimen:  Stool Updated:  05/07/19 1234    Stool culture [[id number]] Collected:  05/07/19 1233    Order Status:  Sent Specimen:  Stool Updated:  05/07/19 1234    Influenza A & B by Molecular [[id number]]     Order Status:  No result Specimen:  Nasopharyngeal Swab         Specimen (12h ago, onward)     None        Recent Labs   Lab 05/06/19  0624 05/07/19  0333   COLORU Yellow Yellow   SPECGRAV <=1.005* 1.015   PHUR 6.0 5.0   PROTEINUA Negative Negative   NITRITE Negative Negative   LEUKOCYTESUR Negative Negative   UROBILINOGEN Negative  --        Diagnostic Results:    Narrative     EXAMINATION:  CT ABDOMEN PELVIS WITHOUT CONTRAST    CLINICAL HISTORY:  Abd pain, fever, abscess suspected;    TECHNIQUE:  Low dose axial images, sagittal and coronal reformations were obtained from the lung bases to the pubic symphysis.  30 mL of oral Omnipaque 350 was administered.  No IV contrast.    COMPARISON:  Transplant ultrasound 04/12/2019, CT abdomen and pelvis 04/03/2019    FINDINGS:  The visualized lung bases demonstrate dependent bibasilar atelectasis.  There are punctate calcified right lower lobe granulomas.  The visualized portions of the heart and pericardium are unremarkable.    Please note evaluation of solid organ parenchyma is limited due to noncontrast technique.  The liver, spleen, and adrenal glands demonstrate an unremarkable noncontrast CT appearance.  The pancreas is atrophic but otherwise unremarkable.  The gallbladder is surgically absent.  No intra or extrahepatic biliary ductal dilatation.    The native kidneys are atrophic without evidence of hydronephrosis.  There are bilateral renal hypodensities the larger of which likely represent renal cysts and many of which are too small to definitively characterize.  The urinary bladder is unremarkable.  There is a renal allograft within the right lower with adjacent peritransplant stranding and ill-defined fluid, which appears similar to most recent CT examination.  The renal allograft demonstrates no evidence of hydronephrosis.  The uterus and adnexal regions are within normal limits.    The abdominal aorta is nonaneurysmal with atherosclerosis.  There is no bulky lymphadenopathy.  There is a small hiatal hernia.  There is diverticulosis demonstrate no evidence  of obstruction or inflammatory change.  No ascites, free intraperitoneal air or portal venous gas.    The visualized osseous structures demonstrate degenerative and postoperative change without acute abnormality.  There is a very small residual volume of fluid along the right abdominopelvic incision line, similar to prior examination.      Impression       1. Stably positioned right lower quadrant renal allograft in place with slight adjacent peritransplant fat stranding and minimal fluid similar to most recent CT examination.  No evidence of hydronephrosis.  Further evaluation with dedicated transplant ultrasound as warranted.  2. Minimal residual fluid along the right abdominopelvic incision, also similar to prior exam.  3. Findings in keeping with medical renal disease with bilateral renal atrophy, renal cysts and subcentimeter renal hypodensities too small to definitively characterize.  4. Cholecystectomy, small hiatal hernia, diverticulosis without evidence to suggest diverticulitis.  5. Additional findings as above.      Electronically signed by: Luisa Webster MD  Date: 05/07/2019  Time: 23:22       ASSESSMENT/PLAN:     Plan:     Anemia   - Hemoglobin has ranged 6.9 initially during this admission to 8.9 today  -  She has received 2 U of pRBC in the past two days.   - when she previously had CKD3 or ESRD prior to transplant, that may have contributed to chronicity of prior anemia  - currently, she may have some secondary to state of recent recurrent infectious illness, secondary to medications such as tacrolimus,and also underlying aplastic anemia secondary to parvovirus  - will allow some period of time to see if counts recover during this hospital stay and plan for bone marrow biopsy over next few days if recover of counts does not occur  - transfuse to Hbg > 7     Leuokpenia/Neutropenia  -  viral testing for HIV, EBV, and CMV have come back to be negative  -  Await quant PCR testing for parvovirus, sent in  addition to prior Parvovirus qual PCR positive testing in 04/19  - possible secondary to medications or infections  - current ANC of 768, monitor daily CBC  - would hold any filgastrim support until ANC is less than 500, discussed with primary team    Discussed with Dr. Chicho Neumann MD  Hematololgy/Oncology Fellow, PGY IV  [facility]

## 2019-05-08 NOTE — PROGRESS NOTES
[facility]  Infectious Disease  Progress Note    Patient Name: Andra Newell  MRN: [id number]  Admission Date: 5/7/2019  Length of Stay: 1 days  Attending Physician: Beto Slade Jr., MD  Primary Care Provider: Gita Cohen MD    Isolation Status: No active isolations  Assessment/Plan:      * Fever  58yo woman w/a history of HTN, HLD, DM2, and subsequent ESRD (s/p LRDRT 1/14/2019, CMV D+/R-, campath induction, on maintenance tacro/MMF; c/b anemia with recently diagnosed parvovirus infection with positive PCR 4/2019 and recurrent ESBL Klebsiella UTI's s/p bland cystoureteroscopy on 4/11 by urogynecology) who was admitted on 5/7/2019 with FUO (prior admission for fever last month without source; 1-2 fevers/week), chills, anorexia, dry cough (seemingly due to reflux), nausea, loose stools, persistent anemia (HCT 21), and new overall myelosuppression (WBC 2.6, ) and elevated LFT's. Differential for complex presentation notable for known parvovirus infection and possible secondary infectious etiology (CMV would be highest on list given high risk serostatus, currently on unknown study drug, not viremic but possible tissue invasive disease without viremia). She is stable despite these findings.     - would administer IVIG 1g/kg (second/final dose today) for parvovirus infection with associated symptomatic anemia still requiring transfusions  - await stool studies given acute on chronic diarrhea (including stool cx, C.diff, O&P, shiga toxin, giardia/crypto antigen, norovirus/rotavirus PCR, repeat CMV quant, and stool pathogens panel sent to [place]) -- C.diff negative, rest pending  - colonoscopy planned tomorrow for biopsies -- appreciate GI teams assistance  - have contacted Dr. Winslow to review CMV treatment via study -- on hold at present        Anticipated Disposition: pending improvement    Thank you for your consult. I will follow-up with patient. Please contact us if you have any  additional questions.     Huong Chavez MD  Transplant ID Attending  [id number]    Huong Chavez MD  Infectious Disease  [facility]    Subjective:     Principal Problem:Fever    HPI:     Interval History: Feels a bit better today. Still has loose stools and leukopenia. Fevers improved. LFT's pending.    Review of Systems   Constitutional: Positive for appetite change. Negative for activity change, chills, diaphoresis and fever.   HENT: Negative for congestion, sinus pressure, sinus pain and trouble swallowing.    Respiratory: Negative for cough, chest tightness and shortness of breath.    Cardiovascular: Negative for chest pain, palpitations and leg swelling.   Gastrointestinal: Positive for diarrhea. Negative for abdominal distention, abdominal pain, constipation, nausea and vomiting.   Genitourinary: Negative for difficulty urinating, dysuria, flank pain and urgency.   Musculoskeletal: Negative for neck pain and neck stiffness.   Skin: Negative for color change, pallor and rash.   Allergic/Immunologic: Positive for immunocompromised state.   Neurological: Negative for dizziness, tremors and light-headedness.   Psychiatric/Behavioral: Negative for behavioral problems, decreased concentration and sleep disturbance. The patient is not nervous/anxious.      Objective:     Vital Signs (Most Recent):  Temp: 98.3 °F (36.8 °C) (05/08/19 1154)  Pulse: (!) 59 (05/08/19 1416)  Resp: 18 (05/08/19 1416)  BP: (!) 170/73 (05/08/19 1416)  SpO2: 96 % (05/08/19 1416) Vital Signs (24h Range):  Temp:  [97.8 °F (36.6 °C)-98.6 °F (37 °C)] 98.3 °F (36.8 °C)  Pulse:  [52-89] 59  Resp:  [17-20] 18  SpO2:  [93 %-100 %] 96 %  BP: (137-198)/(64-82) 170/73     Weight: 94.7 kg (208 lb 12.4 oz)  Body mass index is 32.7 kg/m².    Estimated Creatinine Clearance: 69.8 mL/min (based on SCr of 1 mg/dL).    Physical Exam   Constitutional: She is oriented to person, place, and time. She appears well-developed. No distress.    HENT:   Head: Normocephalic and atraumatic.   Eyes: Right eye exhibits no discharge. Left eye exhibits no discharge. No scleral icterus.   Neck: Normal range of motion.   Cardiovascular: Normal rate, regular rhythm, normal heart sounds and intact distal pulses.   Pulmonary/Chest: Effort normal and breath sounds normal. She has no wheezes. She has no rales.   Abdominal: Soft. Bowel sounds are normal. She exhibits no distension. There is no tenderness.   Well healed kidney incision   Musculoskeletal: She exhibits no edema.   Neurological: She is alert and oriented to person, place, and time.   Skin: Skin is warm and dry. She is not diaphoretic. No erythema.   Psychiatric: She has a normal mood and affect. Her behavior is normal. Judgment and thought content normal.   Nursing note and vitals reviewed.      Significant Labs:   CBC:   Recent Labs   Lab 05/07/19  0111 05/07/19  0754 05/08/19  0633   WBC 2.82* 2.66* 1.13*   HGB 6.4* 7.6* 8.9*   HCT 20.4* 23.4* 26.7*    150 155     CMP:   Recent Labs   Lab 05/07/19  0111 05/07/19  0754 05/08/19  0633    142 139   K 4.2 4.7 4.1    109 108   CO2 24 25 25   * 130* 154*   BUN 14 13 17   CREATININE 1.1 1.1 1.0   CALCIUM 8.8 8.9 8.9   PROT 6.1  --   --    ALBUMIN 3.2* 3.2* 3.0*   BILITOT 0.5  --   --    ALKPHOS 161*  --   --    AST 42*  --   --    ALT 46*  --   --    ANIONGAP 8 8 6*   EGFRNONAA 54.3* 54.3* >60.0       Significant Imaging: I have reviewed all pertinent imaging results/findings within the past 24 hours.

## 2019-05-08 NOTE — SUBJECTIVE & OBJECTIVE
Past Medical History:   Diagnosis Date    Anemia     Anemia in chronic kidney disease, on chronic dialysis 7/12/2017    Arthritis     Asthma     allergic airway    CKD stage III (moderate) 2/18/2019    Colon polyp     Depression     Diabetes mellitus     Diverticulosis     Eosinophilia     ESRD (end stage renal disease)     stage V, due for living donor 9/2018    ESRD on dialysis     GERD (gastroesophageal reflux disease)     Gout     Gout     Hyperlipidemia     Hypertension     Low back pain     MRSA carrier     Obesity     Renal disorder     Rotator cuff syndrome--left     Thyroid disease     Ulnar neuropathy of right upper extremity     Uncontrolled type 2 diabetes mellitus with hyperglycemia        Past Surgical History:   Procedure Laterality Date    ACHILLES TENDON SURGERY Left May 2015    ARTHROSCOPY, HIP Left 10/3/2013    [facility] at [facility] OR    ARTHROSCOPY-HIP WITH TROCHANTERIC BURSECTOMY Left 10/3/2013    [facility]    ARTHROSCOPY-SHOULDER Left 11/24/2015    [facility]    ARTHROSCOPY-SHOULDER WITH SUBACROMIAL DECOMPRESSION Left 7/12/2016    [facility]    BACK SURGERY      CHOLECYSTECTOMY      COLONOSCOPY N/A 9/6/2017    [facility]    DEBRIDEMENT-SHOULDER-ARTHROSCOPIC Left 7/12/2016    [facility]    DEBRIDEMENT-SHOULDER-ARTHROSCOPIC; LABRAL Left 11/24/2015    [facility]    DIALYSIS FISTULA CREATION  12/2017    FEMOROPLASTY, ARTHROSCOPIC Left 10/3/2013    [facility]    HEMORRHOID SURGERY      INJECTION-AMNIOX Left 7/12/2016    [facility]    JOINT REPLACEMENT      left    KNEE SURGERY      LYSIS-ADHESION Left 11/24/2015    [facility]    REPAIR ROTATOR CUFF  ARTHROSCOPIC Left 7/12/2016    [facility]    REPAIR-TENDON-BICEP Left 11/24/2015    [facility]    SHOULDER ARTHROSCOPY      SHOULDER SURGERY      TRANSPLANT, KIDNEY N/A 1/14/2019    [facility]    UPPER GASTROINTESTINAL ENDOSCOPY  ~2013    Dr. Moralez       Review of patient's allergies indicates:   Allergen Reactions    Torsemide Diarrhea     Diarrhea stopped once discontinued med    Codeine Itching     Can take oxycodone and hydrocodone with Benadryl     Family History     Problem Relation (Age of Onset)    Alzheimer's disease Mother    COPD Maternal Aunt    Diabetes Mother, Sister, Maternal Grandmother, Maternal Aunt    Heart disease Father, Brother, Maternal Aunt    Hyperlipidemia Mother    Hypertension Maternal Grandmother, Maternal Aunt    Lupus Sister    No Known Problems Son, Paternal Grandmother, Paternal Grandfather, Maternal Uncle, Paternal Aunt, Paternal Uncle    Ovarian cancer Sister    Stroke Father    Thyroid disease Mother        Tobacco Use    Smoking status: Never Smoker    Smokeless tobacco: Never Used   Substance and Sexual Activity    Alcohol use: No    Drug use: No    Sexual activity: Never     Review of Systems   Constitutional: Positive for appetite change. Negative for activity change, chills, diaphoresis, fatigue, fever and unexpected weight change.   HENT: Negative for sore throat and trouble swallowing.    Eyes: Negative for visual disturbance.   Respiratory: Negative for chest tightness and shortness of breath.    Cardiovascular: Negative for chest pain and leg swelling.   Gastrointestinal: Positive for diarrhea, nausea and vomiting. Negative for abdominal distention, abdominal pain, anal bleeding, blood in stool and constipation.   Genitourinary: Negative for dysuria and hematuria.   Musculoskeletal: Negative for arthralgias and myalgias.   Skin: Negative for rash.    Neurological: Negative for dizziness, weakness, light-headedness and headaches.   Psychiatric/Behavioral: Negative for agitation and confusion.     Objective:     Vital Signs (Most Recent):  Temp: 98.3 °F (36.8 °C) (05/08/19 1154)  Pulse: (!) 57 (05/08/19 1536)  Resp: 18 (05/08/19 1535)  BP: (!) 151/67 (05/08/19 1535)  SpO2: 96 % (05/08/19 1536) Vital Signs (24h Range):  Temp:  [97.8 °F (36.6 °C)-98.5 °F (36.9 °C)] 98.3 °F (36.8 °C)  Pulse:  [52-89] 57  Resp:  [17-20] 18  SpO2:  [93 %-100 %] 96 %  BP: (147-198)/(66-82) 151/67     Weight: 94.7 kg (208 lb 12.4 oz) (05/07/19 0600)  Body mass index is 32.7 kg/m².      Intake/Output Summary (Last 24 hours) at 5/8/2019 1547  Last data filed at 5/8/2019 0715  Gross per 24 hour   Intake 2322.5 ml   Output 1800 ml   Net 522.5 ml       Lines/Drains/Airways     Drain                 Hemodialysis AV Fistula Left upper arm -- days          Peripheral Intravenous Line                 Peripheral IV - Single Lumen 05/07/19 1115 Anterior;Proximal;Right Forearm 1 day         Peripheral IV - Single Lumen 05/07/19 1415 18 G;1 3/4 in Right Forearm 1 day                Physical Exam   Constitutional: She is oriented to person, place, and time. She appears well-developed and well-nourished. No distress.   Well appearing, no distress.    HENT:   Head: Normocephalic and atraumatic.   Mouth/Throat: Oropharynx is clear and moist. No oropharyngeal exudate.   Eyes: Conjunctivae are normal. No scleral icterus.   Cardiovascular: Normal rate, regular rhythm, normal heart sounds and intact distal pulses.   Pulmonary/Chest: Effort normal and breath sounds normal. No respiratory distress.   Abdominal: Soft. Bowel sounds are normal. She exhibits no distension and no mass. There is no tenderness. There is no rebound and no guarding.   Soft, non-tender abdomen.   Musculoskeletal: She exhibits no edema or tenderness.   Lymphadenopathy:     She has no cervical adenopathy.   Neurological: She is alert  and oriented to person, place, and time.   Skin: Skin is warm. Capillary refill takes less than 2 seconds. No rash noted. She is not diaphoretic.   Psychiatric: She has a normal mood and affect. Her behavior is normal. Judgment and thought content normal.   Nursing note and vitals reviewed.      Significant Labs:  All pertinent lab results from the last 24 hours have been reviewed.    Significant Imaging:  Imaging results within the past 24 hours have been reviewed.

## 2019-05-08 NOTE — CONSULTS
[facility]  Gastroenterology  Consult Note    Patient Name: Andra Newell  MRN: [id number]  Admission Date: 5/7/2019  Hospital Length of Stay: 1 days  Code Status: Full Code   Attending Provider: Beto Slade Jr., MD   Consulting Provider: Chapincito Alaniz MD  Primary Care Physician: Gita Cohen MD  Principal Problem:Fever    Inpatient consult to Gastroenterology  Consult performed by: Chapincito Alaniz MD  Consult ordered by: Darby Rader PA-C        Subjective:     HPI:  Ms Newell is a 59 year old female with history of ESRD s/p transplant (1/14/19, CMV D+/R-), HTN, DM, who is presenting to the hospital with fever, n/v/d; Gi consulted due to concern for CMV.    She underwent transplant on 1/14/19 with post-transplant period notable for readmissions due to recurrent ESBL (most recently treated with ertapenem and cipro, end 4/25). Presented to OSH on 5/6 due to fever and nausea and vomitus. Labs notable for anemia (hgb 8 -> 6.9), leukopenia (1.1 on 5/8). CTAP (5/7) non-contrast: unremarkable. She was seen by ID and recommended for IVIG (for empiric treatment of parvovirus) and consideration of CMV (is on a CMV treatment study).    On history she reports she has had ongoing diarrhea for ~2 months. Denies any issues with diarrhea preceding transplant or any obvious changes in routine/medications/etc prior to onset of her diarrhea. Denies any abdominal pain, n/v at that time. Notes diarrhea was all day, generally 7BM/day, and up to 3-4BM/night. Diarrhea is non-bloody. Is improved with imodium but only transient relief (will cause constipation x2 days then one formed stool then resolution of diarrhea).    She presented to the hospital due to new onset nausea and non-bloody, bilious vomitus since Sunday (vomitus ~6/day) and associated fever. Currently nausea improved since hospitalization. Afebrile since admission. 5 episodes of diarrhea on 5/7, 2 thusfar on 5/8.    Workup thus  far:  - c.diff antigen +ve, toxin negative (5/7)  - stool culture pending  - e. Coli pending  - CMV pending (negative on 4/29/19)  - Parvovirus pending  - BK Virus pending  - GI pathogen pending  - acute hep negative  - norovirus, rotovirus pending  - giardia pending    Prior Endo Hx  Colonoscopy. (9/2017) Provider: Dr. AYLIN Bryson. Indication: CRC Screening. Extent: Cecum. Preparation:Adequate.  - 3x 3-6mm polyps in recto-sigmoid colon        Past Medical History:   Diagnosis Date    Anemia     Anemia in chronic kidney disease, on chronic dialysis 7/12/2017    Arthritis     Asthma     allergic airway    CKD stage III (moderate) 2/18/2019    Colon polyp     Depression     Diabetes mellitus     Diverticulosis     Eosinophilia     ESRD (end stage renal disease)     stage V, due for living donor 9/2018    ESRD on dialysis     GERD (gastroesophageal reflux disease)     Gout     Gout     Hyperlipidemia     Hypertension     Low back pain     MRSA carrier     Obesity     Renal disorder     Rotator cuff syndrome--left     Thyroid disease     Ulnar neuropathy of right upper extremity     Uncontrolled type 2 diabetes mellitus with hyperglycemia        Past Surgical History:   Procedure Laterality Date    ACHILLES TENDON SURGERY Left May 2015    ARTHROSCOPY, HIP Left 10/3/2013    [facility]    ARTHROSCOPY-HIP WITH TROCHANTERIC BURSECTOMY Left 10/3/2013    [facility]    ARTHROSCOPY-SHOULDER Left 11/24/2015    [facility]    ARTHROSCOPY-SHOULDER WITH SUBACROMIAL DECOMPRESSION Left 7/12/2016    [facility]    BACK SURGERY      CHOLECYSTECTOMY      COLONOSCOPY N/A 9/6/2017    [facility]    DEBRIDEMENT-SHOULDER-ARTHROSCOPIC Left 7/12/2016    [facility]    DEBRIDEMENT-SHOULDER-ARTHROSCOPIC; LABRAL Left  11/24/2015    [facility]    DIALYSIS FISTULA CREATION  12/2017    FEMOROPLASTY, ARTHROSCOPIC Left 10/3/2013    [facility] OR    HEMORRHOID SURGERY      INJECTION-AMNIOX Left 7/12/2016    [facility] OR    JOINT REPLACEMENT      left    KNEE SURGERY      LYSIS-ADHESION Left 11/24/2015    [facility]    REPAIR ROTATOR CUFF ARTHROSCOPIC Left 7/12/2016    [facility]    REPAIR-TENDON-BICEP Left 11/24/2015    [facility] OR    SHOULDER ARTHROSCOPY      SHOULDER SURGERY      TRANSPLANT, KIDNEY N/A 1/14/2019    Performed by [facility] at [facility] OR [address] [place] [address]    UPPER GASTROINTESTINAL ENDOSCOPY  ~2013    Dr. Moralez       Review of patient's allergies indicates:   Allergen Reactions    Torsemide Diarrhea     Diarrhea stopped once discontinued med    Codeine Itching     Can take oxycodone and hydrocodone with Benadryl     Family History     Problem Relation (Age of Onset)    Alzheimer's disease Mother    COPD Maternal Aunt    Diabetes Mother, Sister, Maternal Grandmother, Maternal Aunt    Heart disease Father, Brother, Maternal Aunt    Hyperlipidemia Mother    Hypertension Maternal Grandmother, Maternal Aunt    Lupus Sister    No Known Problems Son, Paternal Grandmother, Paternal Grandfather, Maternal Uncle, Paternal Aunt, Paternal Uncle    Ovarian cancer Sister    Stroke Father    Thyroid disease Mother        Tobacco Use    Smoking status: Never Smoker    Smokeless tobacco: Never Used   Substance and Sexual Activity    Alcohol use: No    Drug use: No    Sexual activity: Never     Review of Systems   Constitutional: Positive for appetite change. Negative for activity change, chills, diaphoresis, fatigue, fever and unexpected weight change.   HENT: Negative for sore throat and trouble swallowing.    Eyes: Negative for visual  disturbance.   Respiratory: Negative for chest tightness and shortness of breath.    Cardiovascular: Negative for chest pain and leg swelling.   Gastrointestinal: Positive for diarrhea, nausea and vomiting. Negative for abdominal distention, abdominal pain, anal bleeding, blood in stool and constipation.   Genitourinary: Negative for dysuria and hematuria.   Musculoskeletal: Negative for arthralgias and myalgias.   Skin: Negative for rash.   Neurological: Negative for dizziness, weakness, light-headedness and headaches.   Psychiatric/Behavioral: Negative for agitation and confusion.     Objective:     Vital Signs (Most Recent):  Temp: 98.3 °F (36.8 °C) (05/08/19 1154)  Pulse: (!) 57 (05/08/19 1536)  Resp: 18 (05/08/19 1535)  BP: (!) 151/67 (05/08/19 1535)  SpO2: 96 % (05/08/19 1536) Vital Signs (24h Range):  Temp:  [97.8 °F (36.6 °C)-98.5 °F (36.9 °C)] 98.3 °F (36.8 °C)  Pulse:  [52-89] 57  Resp:  [17-20] 18  SpO2:  [93 %-100 %] 96 %  BP: (147-198)/(66-82) 151/67     Weight: 94.7 kg (208 lb 12.4 oz) (05/07/19 0600)  Body mass index is 32.7 kg/m².      Intake/Output Summary (Last 24 hours) at 5/8/2019 1547  Last data filed at 5/8/2019 0715  Gross per 24 hour   Intake 2322.5 ml   Output 1800 ml   Net 522.5 ml       Lines/Drains/Airways     Drain                 Hemodialysis AV Fistula Left upper arm -- days          Peripheral Intravenous Line                 Peripheral IV - Single Lumen 05/07/19 1115 Anterior;Proximal;Right Forearm 1 day         Peripheral IV - Single Lumen 05/07/19 1415 18 G;1 3/4 in Right Forearm 1 day                Physical Exam   Constitutional: She is oriented to person, place, and time. She appears well-developed and well-nourished. No distress.   Well appearing, no distress.    HENT:   Head: Normocephalic and atraumatic.   Mouth/Throat: Oropharynx is clear and moist. No oropharyngeal exudate.   Eyes: Conjunctivae are normal. No scleral icterus.   Cardiovascular: Normal rate, regular rhythm,  normal heart sounds and intact distal pulses.   Pulmonary/Chest: Effort normal and breath sounds normal. No respiratory distress.   Abdominal: Soft. Bowel sounds are normal. She exhibits no distension and no mass. There is no tenderness. There is no rebound and no guarding.   Soft, non-tender abdomen.   Musculoskeletal: She exhibits no edema or tenderness.   Lymphadenopathy:     She has no cervical adenopathy.   Neurological: She is alert and oriented to person, place, and time.   Skin: Skin is warm. Capillary refill takes less than 2 seconds. No rash noted. She is not diaphoretic.   Psychiatric: She has a normal mood and affect. Her behavior is normal. Judgment and thought content normal.   Nursing note and vitals reviewed.      Significant Labs:  All pertinent lab results from the last 24 hours have been reviewed.    Significant Imaging:  Imaging results within the past 24 hours have been reviewed.    Assessment/Plan:     Diarrhea  Ms Newell is a 59 year old female with history of ESRD s/p transplant (1/14/19, CMV D+/R-), HTN, DM, who is presenting to the hospital with fever, n/v/d; Gi consulted due to concern for CMV.    Unclear the cause of her diarrhea. Onset was ~2 months ago and has had negative CMV titers though cannot definitively exclude CMV colitis. Has had infectious workup (some currently pending) but negative thusfar. CT was unremarkable.    Plan  - clear liquid diet, prep today  - colonoscopy tomorrow to evaluate  - EGD tomorrow due to concern for diarrhea and new nausea and vomiting  - plan discussed with primary team        Thank you for your consult. I will follow-up with patient. Please contact us if you have any additional questions.    Chapincito Alaniz MD  Gastroenterology  [facility]

## 2019-05-08 NOTE — PROGRESS NOTES
"[facility]  Endocrinology  Progress Note    Admit Date: 5/7/2019     Reason for Consult: Management of T2DM, Hyperglycemia      Surgical Procedure and Date:   OKT x1 on 01/14/2019     Diabetes diagnosis year:     2005     Home Diabetes Medications:  Basaglar to 30 u qhs; Start Novolog 6 u AC + correction with meals     How often checking glucose at home? 1-3 x day   BG readings on regimen: low 200's  Hypoglycemia on the regimen?  No  Missed doses on regimen?  No     Diabetes Complications include:     Hyperglycemia and Diabetic peripheral neuropathy      Complicating diabetes co morbidities:   Thyroid disease and Obesity       HPI:   Patient is a 59 y.o. female with a diagnosis of chronic back pain, anemia, and DM/HTN nephropathy ESRD now s/p living kidney transplant 1/14/2019. Pt admitted as a transfer from [facility] where she presented early AM 5/6 due to n/v this AM and fevers x several days. Patient sees Mana Webber DNP, NP for chronic DM management. Endocrinology consulted to manage DM/hyperglycemia during admission to [facility].     Lab Results   Component Value Date    HGBA1C 8.9 (H) 05/07/2019    HGBA1C 8.9 (H) 05/07/2019                     Interval HPI:   Overnight events:    BG is within goal on SQ insulin regimen. NAEON.     Eating:   <25%  Nausea: No  Hypoglycemia and intervention: No  Fever: No  TPN and/or TF: No  If yes, type of TF/TPN and rate: None    BP (!) 151/68 (Patient Position: Standing)   Pulse 89   Temp 98.5 °F (36.9 °C) (Oral)   Resp 18   Ht 5' 7" (1.702 m)   Wt 94.7 kg (208 lb 12.4 oz)   LMP 02/28/2012   SpO2 100%   Breastfeeding? No   BMI 32.70 kg/m²      Labs Reviewed and Include    Recent Labs   Lab 05/08/19  0633   *   CALCIUM 8.9   ALBUMIN 3.0*      K 4.1   CO2 25      BUN 17   CREATININE 1.0     Lab Results   Component Value Date    WBC 1.13 (LL) 05/08/2019    HGB 8.9 (L) 05/08/2019    HCT 26.7 (L) 05/08/2019    MCV 90 05/08/2019    "  05/08/2019     No results for input(s): TSH, FREET4 in the last 168 hours.  Lab Results   Component Value Date    HGBA1C 8.9 (H) 05/07/2019    HGBA1C 8.9 (H) 05/07/2019       Nutritional status:   Body mass index is 32.7 kg/m².  Lab Results   Component Value Date    ALBUMIN 3.0 (L) 05/08/2019    ALBUMIN 3.2 (L) 05/07/2019    ALBUMIN 3.2 (L) 05/07/2019     No results found for: PREALBUMIN    Estimated Creatinine Clearance: 69.8 mL/min (based on SCr of 1 mg/dL).    Accu-Checks  Recent Labs     05/06/19  2122 05/06/19  2255 05/07/19  0812 05/07/19  1122 05/07/19  1652 05/07/19  2219   POCTGLUCOSE 175* 161* 141* 171* 157* 183*       Current Medications and/or Treatments Impacting Glycemic Control  Immunotherapy:    Immunosuppressants         Stop Route Frequency     tacrolimus capsule 1 mg      -- Oral 2 times daily        Steroids:   Hormones (From admission, onward)    None        Pressors:    Autonomic Drugs (From admission, onward)    None        Hyperglycemia/Diabetes Medications:   Antihyperglycemics (From admission, onward)    Start     Stop Route Frequency Ordered    05/07/19 2100  insulin detemir U-100 pen 24 Units      -- SubQ Nightly 05/07/19 1038    05/07/19 1145  insulin aspart U-100 pen 5 Units      -- SubQ 3 times daily with meals 05/07/19 1038    05/07/19 1138  insulin aspart U-100 pen 1-10 Units      -- SubQ Before meals & nightly PRN 05/07/19 1038          ASSESSMENT and PLAN    * Fever  Infection may elevate BG readings  Managed per primary team  Avoid hypoglycemia        Uncontrolled type 2 diabetes mellitus, with long-term current use of insulin  BG goal 140 - 180    Appetite remains minimal.     Continue Levemir 24 units HS. 20% reduction from home dosing.     Continue Novolog 5 units TIDWM. 20% reduction from home dosing.     Moderate Dose SQ Insulin Correction Scale. Patient has proven to have insulin resistance during pat admissions. CR is 1 and wnl.     ** Please call Endocrine for any  BG related issues **  ** Please notify Endocrine for any change and/or advance in diet**    Discharge Recommendations:    Most likely continue home MDI.    Patient will need to continue to keep BG logs, and follow-up with PCP on [facility] for continued DM management.       Status post living-donor kidney transplantation  Managed per primary team  Avoid hypoglycemia      Prophylactic immunotherapy  On immunosepresive therapy per transplant team; may elevate BG readings        Long-term use of immunosuppressant medication  On immunosepresive therapy per transplant team; may elevate BG readings        Anemia of chronic disease  May affect HbA1c Accuracy       HLD (hyperlipidemia)  Managed per primary team  Condition may cause insulin resistance             Tre Sykes, NP  Endocrinology  [facility]

## 2019-05-08 NOTE — ASSESSMENT & PLAN NOTE
- [facility] labs significant for anemia (H/H 6.9/21.0).   - Pt has hx chronic anemia (previously likely 2/2 ESRD, but now that renal fx recovered, unclear etiology - possibly due to Parvovirus)  - Iron studies, folate, B12, LDH, retic, and haptoglobin reviewed.    - Received 2 units PRBC and H&H responded appropriately  - Heme/onc consulted for further assistance due to WBC decreasing as well - appreciate recs

## 2019-05-08 NOTE — ASSESSMENT & PLAN NOTE
- Pt is scheduled for monthly pentamidine treatments for PCP prophylaxis (next appt 5/15/19)  - Pt enrolled in CMV prophylaxis drug study (CMV D+/R-); drug on hold  - CMV PCR pending. PCR 4/29 undetected.

## 2019-05-08 NOTE — ASSESSMENT & PLAN NOTE
58yo woman w/a history of HTN, HLD, DM2, and subsequent ESRD (s/p LRDRT 1/14/2019, CMV D+/R-, campath induction, on maintenance tacro/MMF; c/b anemia with recently diagnosed parvovirus infection with positive PCR 4/2019 and recurrent ESBL Klebsiella UTI's s/p bland cystoureteroscopy on 4/11 by urogynecology) who was admitted on 5/7/2019 with FUO (prior admission for fever last month without source; 1-2 fevers/week), chills, anorexia, dry cough (seemingly due to reflux), nausea, loose stools, persistent anemia (HCT 21), and new overall myelosuppression (WBC 2.6, ) and elevated LFT's. Differential for complex presentation notable for known parvovirus infection and possible secondary infectious etiology (CMV would be highest on list given high risk serostatus, currently on unknown study drug, not viremic but possible tissue invasive disease without viremia). She is stable despite these findings.     - would administer IVIG 1g/kg (second/final dose today) for parvovirus infection with associated symptomatic anemia still requiring transfusions  - await stool studies given acute on chronic diarrhea (including stool cx, C.diff, O&P, shiga toxin, giardia/crypto antigen, norovirus/rotavirus PCR, repeat CMV quant, and stool pathogens panel sent to [place]) -- C.diff negative, rest pending  - colonoscopy planned tomorrow for biopsies -- appreciate GI teams assistance  - have contacted Dr. Winslow to review CMV treatment via study -- on hold at present

## 2019-05-08 NOTE — PROGRESS NOTES
[facility]  Kidney Transplant  Progress Note      Reason for Follow-up: Reassessment of Kidney Transplant - 1/14/2019  (#1) recipient and management of immunosuppression.    ORGAN: LEFT KIDNEY    Donor Type: Living    Induction Medications: Campath      Subjective:   History of Present Illness:  Ms. Newell is a 60 y/o F with PMHx of chronic back pain, anemia, and DM/HTN nephropathy ESRD now s/p living kidney transplant 1/14/2019 (Campath induction, CMV D+/R). Her post op course has been significant for multiple readmissions 2/2 fatigue, weakness, BOYER, and recurrent ESBL Klebsiella UTIs (most recently treated with 5 days of ertapenem to end 4/16 then started on Cipro x 9 days ending 4/25).  Pt admitted as a transfer from [facility] where she presented early AM 5/6 due to n/v this AM and fevers x several days. Tmax in the .5. Infectious workup initiated in ED - Blood cx NGTD. UA negative for infection. Will repeat UA and cx as pt has hx recurrent UTIs. ID consulted (pt had appt w/ Dr Kessler scheduled for 5/7 due to fevers over the weekend). [facility] labs significant for anemia (H/H 6.9/21.0). Pt has hx chronic anemia of indeterminate etiology. Will repeat CBC and check iron studies, folate, B12, LDH, retic, haptoglobin, and Parvovirus PCR. Graft function stable with Cr 1.3 this AM and good UOP.     Ms. Newell is a 59 y.o. year old female who is status post Kidney Transplant - 1/14/2019  (#1).    Her maintenance immunosuppression consists of:   Immunosuppressants (From admission, onward)    Start     Stop Route Frequency Ordered    05/07/19 0945  tacrolimus capsule 1 mg      -- Oral 2 times daily 05/07/19 0838          Interval history:   Pt with elevated BP overnight, slightly improved with PRN hydralazine 5 mg x 2. BP elevated again this AM - SBP 190s. Will give 10 mg hydralazine and monitor. Pain controlled and dose appear significantly volume overloaded. On exam this morning,  patient states she is feeling better today. Afebrile > 24 hours. Continues with diarrhea, had 7 episodes over the last 24 hours. Cdiff Ag + but AB and PCR neg - no need to treat. Stool studies in process. GI consulted this morning, will plan for EGD /colonoscopy to rule out invasive disease. CT a/p unremarkable for acute pathology. Parvovirus PCR qual positive 4/15/19. ID on board. Repeat Parvo PCR in process. Received 1st treatment of IVIG last night. Will get second round of treatment today. Received 2 units of PRBC yesterday and H&H responded appropriately. WBC trending down despite being cellcept, CMV treatment, and bactrim. WBC 1.13 and . Will give neupogen today and consult Heme/onc for further assistance.        Past Medical, Surgical, Family, and Social History:   Unchanged from H&P.    Scheduled Meds:   ergocalciferol  50,000 Units Oral Q7 Days    famotidine  20 mg Oral QHS    heparin (porcine)  5,000 Units Subcutaneous Q8H    Immune Globulin G (IGG)-PRO-IGA 10 % injection (Privigen)  1,000 mg/kg Intravenous Q24H    insulin aspart U-100  5 Units Subcutaneous TIDWM    insulin detemir U-100  24 Units Subcutaneous QHS    levothyroxine  75 mcg Oral Daily    magnesium oxide  800 mg Oral Daily    multivitamin  1 tablet Oral Daily    oxybutynin  10 mg Oral Daily    rosuvastatin  10 mg Oral Daily    tacrolimus  1 mg Oral BID    tbo-filgrastim  480 mcg Subcutaneous Once    vilazodone  40 mg Oral Daily     Continuous Infusions:  PRN Meds:acetaminophen, aluminum-magnesium hydroxide-simethicone, dextrose 50%, dextrose 50%, diphenhydrAMINE, glucagon (human recombinant), glucose, glucose, hydrALAZINE, insulin aspart U-100, LORazepam, ondansetron, promethazine (PHENERGAN) IVPB, sodium chloride 0.9%    Intake/Output - Last 3 Shifts       05/06 0700 - 05/07 0659 05/07 0700 - 05/08 0659 05/08 0700 - 05/09 0659    P.O. 0 1310     Blood 306.3  1622.5    Total Intake(mL/kg) 306.3 (3.2) 1310 (13.8) 1622.5  "(17.1)    Urine (mL/kg/hr)  2250 (1)     Emesis/NG output  0     Other  0     Stool  0     Blood  0     Total Output  2250     Net +306.3 -940 +1622.5           Urine Occurrence 1 x 3 x     Stool Occurrence 0 x 5 x 2 x    Emesis Occurrence  0 x            Review of Systems   Constitutional: Positive for appetite change. Negative for activity change, chills, diaphoresis and fever.   HENT: Negative for congestion, sinus pressure, sinus pain and trouble swallowing.    Respiratory: Negative for cough, chest tightness and shortness of breath.    Cardiovascular: Negative for chest pain, palpitations and leg swelling.   Gastrointestinal: Positive for diarrhea. Negative for abdominal distention, abdominal pain, constipation, nausea and vomiting.   Genitourinary: Negative for difficulty urinating, dysuria, flank pain and urgency.   Musculoskeletal: Negative for neck pain and neck stiffness.   Skin: Negative for color change, pallor and rash.   Allergic/Immunologic: Positive for immunocompromised state.   Neurological: Negative for dizziness, tremors and light-headedness.   Psychiatric/Behavioral: Negative for behavioral problems, decreased concentration and sleep disturbance. The patient is not nervous/anxious.       Objective:     Vital Signs (Most Recent):  Temp: 98.3 °F (36.8 °C) (05/08/19 1154)  Pulse: 64 (05/08/19 1242)  Resp: 18 (05/08/19 1242)  BP: (!) 167/68 (05/08/19 1242)  SpO2: 96 % (05/08/19 1242) Vital Signs (24h Range):  Temp:  [97.8 °F (36.6 °C)-98.6 °F (37 °C)] 98.3 °F (36.8 °C)  Pulse:  [52-89] 64  Resp:  [17-20] 18  SpO2:  [93 %-100 %] 96 %  BP: (137-198)/(64-82) 167/68     Weight: 94.7 kg (208 lb 12.4 oz)  Height: 5' 7" (170.2 cm)  Body mass index is 32.7 kg/m².    Physical Exam   Constitutional: She is oriented to person, place, and time. She appears well-developed. No distress.   HENT:   Head: Normocephalic and atraumatic.   Eyes: Right eye exhibits no discharge. Left eye exhibits no discharge. No " "scleral icterus.   Neck: Normal range of motion.   Cardiovascular: Normal rate, regular rhythm, normal heart sounds and intact distal pulses.   Pulmonary/Chest: Effort normal and breath sounds normal. She has no wheezes. She has no rales.   Abdominal: Soft. Bowel sounds are normal. She exhibits no distension. There is no tenderness.   Well healed kidney incision   Musculoskeletal: She exhibits no edema.   Neurological: She is alert and oriented to person, place, and time.   Skin: Skin is warm and dry. She is not diaphoretic. No erythema.   Psychiatric: She has a normal mood and affect. Her behavior is normal. Judgment and thought content normal.   Nursing note and vitals reviewed.      Laboratory:  CBC:   Recent Labs   Lab 05/07/19  0111 05/07/19  0754 05/08/19  0633   WBC 2.82* 2.66* 1.13*   RBC 2.17* 2.58* 2.97*   HGB 6.4* 7.6* 8.9*   HCT 20.4* 23.4* 26.7*    150 155   MCV 94 91 90   MCH 29.5 29.5 30.0   MCHC 31.4* 32.5 33.3     CMP:   Recent Labs   Lab 05/06/19  0355 05/07/19  0111 05/07/19  0754 05/08/19  0633   * 156* 130* 154*   CALCIUM 9.0 8.8 8.9 8.9   ALBUMIN 3.7 3.2* 3.2* 3.0*   PROT 6.6 6.1  --   --     141 142 139   K 4.2 4.2 4.7 4.1   CO2 21* 24 25 25    109 109 108   BUN 13 14 13 17   CREATININE 1.3 1.1 1.1 1.0   ALKPHOS 195* 161*  --   --    ALT 60* 46*  --   --    AST 60* 42*  --   --      Labs within the past 24 hours have been reviewed.    Diagnostic Results:  All imaging with in the past 24 hours has been reviewed.     Assessment/Plan:     * Fever  - Admitted as transfer from [facility] ED w/ recurrent fever, Tmax 101.5 at previous hospital.   - [facility] Blood cx 5/6 NGTD  - UA clean in ED, repeat also neg. Urine cx pending as pt has hx recurrent UTIs (ESBL)  - Graft fx stable; Lactate wnl; LFTs slightly elevated, likely 2/2 infection.   - CXR 5/6: "Borderline heart size. No intrapulmonary masses or infiltrates are seen. No pneumothorax or pleural effusion is " "noted."  - ID consulted, appreciate recs. Stool studies sent. Acute hepatitis panel negative.   - Parvo PCR qual 4/15 positive. Started IVIG on 5/7 per ID recs. Will get 2nd dose today. Repeat PCR in process.  - CMV PCR, and BK virus pending  - Consulted GI for EGD/c-scopy with biopsy to rule out invasive tissue disease     Neutropenia, unspecified  -WBC trending down despite being off cellcept, CMV treatment, and bactrim  -, will give dose of neupogen today  -heme/onc consulted for further assistance, appreciate recs      History of recurrent urinary tract infection  - Hx recurrent ESBL UTI, most recently treated w/ ertapenem scheduled to stop 4/16  - Given Cipro x 9 days ending 4/25  - Pt has planned urethrocystoscopy for recurrent UTIs  - UA in ED clean. Repeat UA negative on admit. Urine cx pending.     Diarrhea  - Cdiff Ag+, PCR neg   - Stool studies in process  - GI consulted, colonoscopy/EGD tomorrow      At risk for opportunistic infections  - Pt is scheduled for monthly pentamidine treatments for PCP prophylaxis (next appt 5/15/19)  - Pt enrolled in CMV prophylaxis drug study (CMV D+/R-); drug on hold  - CMV PCR pending. PCR 4/29 undetected.        Long-term use of immunosuppressant medication  - See "prophylactic immunotherapy."      Status post living-donor kidney transplantation  - S/p living kidney transplant 1/14/2019 2/2 DM/HTN (Campath induction, CMV D+/R).   - Post op course significant for multiple readmissions 2/2 fatigue, weakness, BOYER, and recurrent ESBL Klebsiella UTIs   - Now admitted as transfer from [facility] ED for fever (Tmax 101.5 today) and n/v   - See "fever."   - Last Allograft US 4/12/19 "upper normal to slightly elevated renal allograft resistive indices. peritransplant fluid collection, unchanged."  - Graft fx stable and adequate UOP.  - Continue strict Is and Os.  - Continue daily RFP.     Gastroesophageal reflux disease without esophagitis  - Continue home " meds.      Prophylactic immunotherapy  - Continue Prograf. Monitor trough and adjust level as needed to achieve therapeutic dose.  - Cellcept on hold 2/2 recurrent infection and neutropenia.      HLD (hyperlipidemia)  - continue rosuvastatin       Anemia of chronic disease  - [facility] labs significant for anemia (H/H 6.9/21.0).   - Pt has hx chronic anemia (previously likely 2/2 ESRD, but now that renal fx recovered, unclear etiology - possibly due to Parvovirus)  - Iron studies, folate, B12, LDH, retic, and haptoglobin reviewed.    - Received 2 units PRBC and H&H responded appropriately  - Heme/onc consulted for further assistance due to WBC decreasing as well - appreciate recs    Depression  - Continue home meds.    Uncontrolled type 2 diabetes mellitus, with long-term current use of insulin  - Diabetic diet.  - Endocrine consulted, appreciate recs.  - insulin per endo        Discharge Planning: Not a candidate for discharge at this time.       Darby Rader PA-C  Kidney Transplant  [facility]
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
## 2019-05-08 NOTE — ASSESSMENT & PLAN NOTE
BG goal 140 - 180    Appetite remains minimal.     Continue Levemir 24 units HS. 20% reduction from home dosing.     Continue Novolog 5 units TIDWM. 20% reduction from home dosing.     Moderate Dose SQ Insulin Correction Scale. Patient has proven to have insulin resistance during pat admissions. CR is 1 and wnl.     ** Please call Endocrine for any BG related issues **  ** Please notify Endocrine for any change and/or advance in diet**    Discharge Recommendations:    Most likely continue home MDI.    Patient will need to continue to keep BG logs, and follow-up with PCP on [facility] for continued DM management.

## 2019-05-09 ENCOUNTER — ANESTHESIA (OUTPATIENT)
Dept: ENDOSCOPY | Facility: HOSPITAL | Age: 59
DRG: 866 | End: 2019-05-09
Payer: MEDICARE

## 2019-05-09 PROBLEM — B34.3 PARVOVIRUS B19 INFECTION: Status: ACTIVE | Noted: 2019-05-09

## 2019-05-09 LAB
ALBUMIN SERPL BCP-MCNC: 3.2 G/DL (ref 3.5–5.2)
ANION GAP SERPL CALC-SCNC: 6 MMOL/L (ref 8–16)
ANISOCYTOSIS BLD QL SMEAR: SLIGHT
BASOPHILS # BLD AUTO: 0 K/UL (ref 0–0.2)
BASOPHILS NFR BLD: 0 % (ref 0–1.9)
BKV DNA SERPL NAA+PROBE-ACNC: <125 COPIES/ML
BKV DNA SERPL NAA+PROBE-LOG#: <2.1 LOG (10) COPIES/ML
BKV DNA SERPL QL NAA+PROBE: NOT DETECTED
BUN SERPL-MCNC: 17 MG/DL (ref 8–23)
CALCIUM SERPL-MCNC: 9.2 MG/DL (ref 8.7–10.5)
CHLORIDE SERPL-SCNC: 105 MMOL/L (ref 95–110)
CO2 SERPL-SCNC: 26 MMOL/L (ref 23–29)
CREAT SERPL-MCNC: 1.2 MG/DL (ref 0.5–1.4)
DIFFERENTIAL METHOD: ABNORMAL
EOSINOPHIL # BLD AUTO: 0 K/UL (ref 0–0.5)
EOSINOPHIL NFR BLD: 1.3 % (ref 0–8)
ERYTHROCYTE [DISTWIDTH] IN BLOOD BY AUTOMATED COUNT: 15.7 % (ref 11.5–14.5)
EST. GFR  (AFRICAN AMERICAN): 56.4 ML/MIN/1.73 M^2
EST. GFR  (NON AFRICAN AMERICAN): 48.9 ML/MIN/1.73 M^2
GLUCOSE SERPL-MCNC: 136 MG/DL (ref 70–110)
HCT VFR BLD AUTO: 28.9 % (ref 37–48.5)
HGB BLD-MCNC: 9.5 G/DL (ref 12–16)
IMM GRANULOCYTES # BLD AUTO: 0.02 K/UL (ref 0–0.04)
IMM GRANULOCYTES NFR BLD AUTO: 1.3 % (ref 0–0.5)
LYMPHOCYTES # BLD AUTO: 0.3 K/UL (ref 1–4.8)
LYMPHOCYTES NFR BLD: 18.2 % (ref 18–48)
MAGNESIUM SERPL-MCNC: 1.4 MG/DL (ref 1.6–2.6)
MCH RBC QN AUTO: 29.6 PG (ref 27–31)
MCHC RBC AUTO-ENTMCNC: 32.9 G/DL (ref 32–36)
MCV RBC AUTO: 90 FL (ref 82–98)
MONOCYTES # BLD AUTO: 0.3 K/UL (ref 0.3–1)
MONOCYTES NFR BLD: 19.5 % (ref 4–15)
NEUTROPHILS # BLD AUTO: 1 K/UL (ref 1.8–7.7)
NEUTROPHILS NFR BLD: 59.7 % (ref 38–73)
NOROVIRUS GI RNA STL QL NAA+PROBE: NOT DETECTED
NOROVIRUS GII RNA STL QL NAA+PROBE: NOT DETECTED
NRBC BLD-RTO: 0 /100 WBC
PHOSPHATE SERPL-MCNC: 3.4 MG/DL (ref 2.7–4.5)
PLATELET # BLD AUTO: 158 K/UL (ref 150–350)
PLATELET BLD QL SMEAR: ABNORMAL
PMV BLD AUTO: 10.3 FL (ref 9.2–12.9)
POCT GLUCOSE: 102 MG/DL (ref 70–110)
POCT GLUCOSE: 120 MG/DL (ref 70–110)
POCT GLUCOSE: 126 MG/DL (ref 70–110)
POCT GLUCOSE: 152 MG/DL (ref 70–110)
POCT GLUCOSE: 156 MG/DL (ref 70–110)
POTASSIUM SERPL-SCNC: 4.4 MMOL/L (ref 3.5–5.1)
RBC # BLD AUTO: 3.21 M/UL (ref 4–5.4)
SODIUM SERPL-SCNC: 137 MMOL/L (ref 136–145)
SPECIMEN SOURCE: NORMAL
TACROLIMUS BLD-MCNC: 7 NG/ML (ref 5–15)
WBC # BLD AUTO: 1.59 K/UL (ref 3.9–12.7)

## 2019-05-09 PROCEDURE — D9220A PRA ANESTHESIA: ICD-10-PCS | Mod: CRNA,,, | Performed by: NURSE ANESTHETIST, CERTIFIED REGISTERED

## 2019-05-09 PROCEDURE — 99233 SBSQ HOSP IP/OBS HIGH 50: CPT | Mod: GC,,, | Performed by: INTERNAL MEDICINE

## 2019-05-09 PROCEDURE — D9220A PRA ANESTHESIA: Mod: CRNA,,, | Performed by: NURSE ANESTHETIST, CERTIFIED REGISTERED

## 2019-05-09 PROCEDURE — G8979 MOBILITY GOAL STATUS: HCPCS | Mod: CH

## 2019-05-09 PROCEDURE — 25000003 PHARM REV CODE 250: Performed by: PHYSICIAN ASSISTANT

## 2019-05-09 PROCEDURE — 27201012 HC FORCEPS, HOT/COLD, DISP: Performed by: INTERNAL MEDICINE

## 2019-05-09 PROCEDURE — D9220A PRA ANESTHESIA: Mod: ANES,,, | Performed by: ANESTHESIOLOGY

## 2019-05-09 PROCEDURE — 45380 COLONOSCOPY AND BIOPSY: CPT | Performed by: INTERNAL MEDICINE

## 2019-05-09 PROCEDURE — 99233 SBSQ HOSP IP/OBS HIGH 50: CPT | Mod: ,,, | Performed by: PHYSICIAN ASSISTANT

## 2019-05-09 PROCEDURE — 43239 EGD BIOPSY SINGLE/MULTIPLE: CPT | Performed by: INTERNAL MEDICINE

## 2019-05-09 PROCEDURE — 97161 PT EVAL LOW COMPLEX 20 MIN: CPT

## 2019-05-09 PROCEDURE — G8978 MOBILITY CURRENT STATUS: HCPCS | Mod: CH

## 2019-05-09 PROCEDURE — 88305 TISSUE EXAM BY PATHOLOGIST: CPT | Performed by: PATHOLOGY

## 2019-05-09 PROCEDURE — 94761 N-INVAS EAR/PLS OXIMETRY MLT: CPT

## 2019-05-09 PROCEDURE — 99233 PR SUBSEQUENT HOSPITAL CARE,LEVL III: ICD-10-PCS | Mod: ,,, | Performed by: PHYSICIAN ASSISTANT

## 2019-05-09 PROCEDURE — 45380 PR COLONOSCOPY,BIOPSY: ICD-10-PCS | Mod: ,,, | Performed by: INTERNAL MEDICINE

## 2019-05-09 PROCEDURE — D9220A PRA ANESTHESIA: ICD-10-PCS | Mod: ANES,,, | Performed by: ANESTHESIOLOGY

## 2019-05-09 PROCEDURE — 43239 PR EGD, FLEX, W/BIOPSY, SGL/MULTI: ICD-10-PCS | Mod: 51,,, | Performed by: INTERNAL MEDICINE

## 2019-05-09 PROCEDURE — 63600175 PHARM REV CODE 636 W HCPCS: Performed by: NURSE ANESTHETIST, CERTIFIED REGISTERED

## 2019-05-09 PROCEDURE — 94640 AIRWAY INHALATION TREATMENT: CPT

## 2019-05-09 PROCEDURE — 25000242 PHARM REV CODE 250 ALT 637 W/ HCPCS: Performed by: PHYSICIAN ASSISTANT

## 2019-05-09 PROCEDURE — 83735 ASSAY OF MAGNESIUM: CPT

## 2019-05-09 PROCEDURE — 82962 GLUCOSE BLOOD TEST: CPT | Performed by: INTERNAL MEDICINE

## 2019-05-09 PROCEDURE — 37000008 HC ANESTHESIA 1ST 15 MINUTES: Performed by: INTERNAL MEDICINE

## 2019-05-09 PROCEDURE — 36415 COLL VENOUS BLD VENIPUNCTURE: CPT

## 2019-05-09 PROCEDURE — 37000009 HC ANESTHESIA EA ADD 15 MINS: Performed by: INTERNAL MEDICINE

## 2019-05-09 PROCEDURE — 20600001 HC STEP DOWN PRIVATE ROOM

## 2019-05-09 PROCEDURE — 80197 ASSAY OF TACROLIMUS: CPT

## 2019-05-09 PROCEDURE — 85025 COMPLETE CBC W/AUTO DIFF WBC: CPT

## 2019-05-09 PROCEDURE — 63600175 PHARM REV CODE 636 W HCPCS: Performed by: NURSE PRACTITIONER

## 2019-05-09 PROCEDURE — 88341 IMHCHEM/IMCYTCHM EA ADD ANTB: CPT | Mod: 26,,, | Performed by: PATHOLOGY

## 2019-05-09 PROCEDURE — 63600175 PHARM REV CODE 636 W HCPCS: Performed by: PHYSICIAN ASSISTANT

## 2019-05-09 PROCEDURE — G8980 MOBILITY D/C STATUS: HCPCS | Mod: CH

## 2019-05-09 PROCEDURE — 25000003 PHARM REV CODE 250: Performed by: NURSE PRACTITIONER

## 2019-05-09 PROCEDURE — 88342 TISSUE SPECIMEN TO PATHOLOGY - SURGERY: ICD-10-PCS | Mod: 26,,, | Performed by: PATHOLOGY

## 2019-05-09 PROCEDURE — 88342 IMHCHEM/IMCYTCHM 1ST ANTB: CPT | Mod: 26,,, | Performed by: PATHOLOGY

## 2019-05-09 PROCEDURE — 88305 TISSUE EXAM BY PATHOLOGIST: CPT | Mod: 26,,, | Performed by: PATHOLOGY

## 2019-05-09 PROCEDURE — 25000003 PHARM REV CODE 250: Performed by: NURSE ANESTHETIST, CERTIFIED REGISTERED

## 2019-05-09 PROCEDURE — 80069 RENAL FUNCTION PANEL: CPT

## 2019-05-09 PROCEDURE — 45380 COLONOSCOPY AND BIOPSY: CPT | Mod: ,,, | Performed by: INTERNAL MEDICINE

## 2019-05-09 PROCEDURE — 88341 PR IHC OR ICC EACH ADD'L SINGLE ANTIBODY  STAINPR: ICD-10-PCS | Mod: 26,,, | Performed by: PATHOLOGY

## 2019-05-09 PROCEDURE — 43239 EGD BIOPSY SINGLE/MULTIPLE: CPT | Mod: 51,,, | Performed by: INTERNAL MEDICINE

## 2019-05-09 PROCEDURE — 88305 TISSUE SPECIMEN TO PATHOLOGY - SURGERY: ICD-10-PCS | Mod: 26,,, | Performed by: PATHOLOGY

## 2019-05-09 PROCEDURE — 97116 GAIT TRAINING THERAPY: CPT

## 2019-05-09 PROCEDURE — 99233 PR SUBSEQUENT HOSPITAL CARE,LEVL III: ICD-10-PCS | Mod: GC,,, | Performed by: INTERNAL MEDICINE

## 2019-05-09 RX ORDER — SODIUM CHLORIDE 0.9 % (FLUSH) 0.9 %
10 SYRINGE (ML) INJECTION
Status: DISCONTINUED | OUTPATIENT
Start: 2019-05-09 | End: 2019-05-10 | Stop reason: HOSPADM

## 2019-05-09 RX ORDER — ACETAMINOPHEN 325 MG/1
650 TABLET ORAL EVERY 8 HOURS PRN
Status: DISCONTINUED | OUTPATIENT
Start: 2019-05-09 | End: 2019-05-10 | Stop reason: HOSPADM

## 2019-05-09 RX ORDER — PENTAMIDINE ISETHIONATE 300 MG/300MG
300 INHALANT RESPIRATORY (INHALATION) ONCE
Status: COMPLETED | OUTPATIENT
Start: 2019-05-09 | End: 2019-05-09

## 2019-05-09 RX ORDER — PROPOFOL 10 MG/ML
INJECTION, EMULSION INTRAVENOUS CONTINUOUS PRN
Status: DISCONTINUED | OUTPATIENT
Start: 2019-05-09 | End: 2019-05-09

## 2019-05-09 RX ORDER — LOPERAMIDE HYDROCHLORIDE 2 MG/1
2 CAPSULE ORAL 4 TIMES DAILY PRN
Status: DISCONTINUED | OUTPATIENT
Start: 2019-05-09 | End: 2019-05-10 | Stop reason: HOSPADM

## 2019-05-09 RX ORDER — LIDOCAINE HCL/PF 100 MG/5ML
SYRINGE (ML) INTRAVENOUS
Status: DISCONTINUED | OUTPATIENT
Start: 2019-05-09 | End: 2019-05-09

## 2019-05-09 RX ORDER — SODIUM CHLORIDE 9 MG/ML
INJECTION, SOLUTION INTRAVENOUS CONTINUOUS PRN
Status: DISCONTINUED | OUTPATIENT
Start: 2019-05-09 | End: 2019-05-09

## 2019-05-09 RX ORDER — ALBUTEROL SULFATE 2.5 MG/.5ML
2.5 SOLUTION RESPIRATORY (INHALATION) ONCE
Status: COMPLETED | OUTPATIENT
Start: 2019-05-09 | End: 2019-05-09

## 2019-05-09 RX ORDER — PANTOPRAZOLE SODIUM 40 MG/1
40 TABLET, DELAYED RELEASE ORAL DAILY
Status: DISCONTINUED | OUTPATIENT
Start: 2019-05-09 | End: 2019-05-10 | Stop reason: HOSPADM

## 2019-05-09 RX ORDER — PROPOFOL 10 MG/ML
INJECTION, EMULSION INTRAVENOUS
Status: DISCONTINUED | OUTPATIENT
Start: 2019-05-09 | End: 2019-05-09

## 2019-05-09 RX ORDER — GLYCOPYRROLATE 0.2 MG/ML
INJECTION INTRAMUSCULAR; INTRAVENOUS
Status: DISCONTINUED | OUTPATIENT
Start: 2019-05-09 | End: 2019-05-09

## 2019-05-09 RX ADMIN — PROPOFOL 70 MG: 10 INJECTION, EMULSION INTRAVENOUS at 11:05

## 2019-05-09 RX ADMIN — ALBUTEROL SULFATE 2.5 MG: 2.5 SOLUTION RESPIRATORY (INHALATION) at 02:05

## 2019-05-09 RX ADMIN — PROPOFOL 200 MCG/KG/MIN: 10 INJECTION, EMULSION INTRAVENOUS at 11:05

## 2019-05-09 RX ADMIN — PROPOFOL 30 MG: 10 INJECTION, EMULSION INTRAVENOUS at 11:05

## 2019-05-09 RX ADMIN — LEVOTHYROXINE SODIUM 75 MCG: 25 TABLET ORAL at 09:05

## 2019-05-09 RX ADMIN — HEPARIN SODIUM 5000 UNITS: 5000 INJECTION, SOLUTION INTRAVENOUS; SUBCUTANEOUS at 08:05

## 2019-05-09 RX ADMIN — THERA TABS 1 TABLET: TAB at 09:05

## 2019-05-09 RX ADMIN — ROSUVASTATIN CALCIUM 10 MG: 10 TABLET, FILM COATED ORAL at 09:05

## 2019-05-09 RX ADMIN — OXYBUTYNIN CHLORIDE 10 MG: 10 TABLET, EXTENDED RELEASE ORAL at 09:05

## 2019-05-09 RX ADMIN — TACROLIMUS 1 MG: 1 CAPSULE ORAL at 09:05

## 2019-05-09 RX ADMIN — SODIUM CHLORIDE: 0.9 INJECTION, SOLUTION INTRAVENOUS at 10:05

## 2019-05-09 RX ADMIN — ONDANSETRON 8 MG: 8 TABLET, ORALLY DISINTEGRATING ORAL at 05:05

## 2019-05-09 RX ADMIN — FAMOTIDINE 20 MG: 20 TABLET, FILM COATED ORAL at 08:05

## 2019-05-09 RX ADMIN — VILAZODONE HYDROCHLORIDE 40 MG: 10 TABLET ORAL at 09:05

## 2019-05-09 RX ADMIN — INSULIN DETEMIR 16 UNITS: 100 INJECTION, SOLUTION SUBCUTANEOUS at 08:05

## 2019-05-09 RX ADMIN — LIDOCAINE HYDROCHLORIDE 100 MG: 20 INJECTION, SOLUTION INTRAVENOUS at 11:05

## 2019-05-09 RX ADMIN — TACROLIMUS 1 MG: 1 CAPSULE ORAL at 05:05

## 2019-05-09 RX ADMIN — PENTAMIDINE ISETHIONATE 300 MG: 300 INHALANT RESPIRATORY (INHALATION) at 02:05

## 2019-05-09 RX ADMIN — MAGNESIUM OXIDE TAB 400 MG (241.3 MG ELEMENTAL MG) 800 MG: 400 (241.3 MG) TAB at 09:05

## 2019-05-09 RX ADMIN — PANTOPRAZOLE SODIUM 40 MG: 40 TABLET, DELAYED RELEASE ORAL at 09:05

## 2019-05-09 RX ADMIN — ACETAMINOPHEN 650 MG: 325 TABLET ORAL at 09:05

## 2019-05-09 RX ADMIN — GLYCOPYRROLATE 0.2 MG: 0.2 INJECTION, SOLUTION INTRAMUSCULAR; INTRAVENOUS at 11:05

## 2019-05-09 NOTE — ASSESSMENT & PLAN NOTE
- qualitative PCR + on 4/15/19  - Txt with IVIG on 5/7 and 5/8  - Repeat quant PCR in process  - will need surveillance every 2 weeks

## 2019-05-09 NOTE — SIGNIFICANT EVENT
Pt bp was 198/83 standing then before I administered the hydalazine iv push, the bp was 136/67. I notified MERCEDES Murillo and she said to hold the hydralazine administration for now.

## 2019-05-09 NOTE — PROGRESS NOTES
[facility]  Kidney Transplant  Progress Note      Reason for Follow-up: Reassessment of Kidney Transplant - 1/14/2019  (#1) recipient and management of immunosuppression.    ORGAN: LEFT KIDNEY    Donor Type: Living    PHS Increased Risk: no   Cold Ischemia:   Induction Medications: Campath      Subjective:   History of Present Illness:  Ms. Newell is a 58 y/o F with PMHx of chronic back pain, anemia, and DM/HTN nephropathy ESRD now s/p living kidney transplant 1/14/2019 (Campath induction, CMV D+/R). Her post op course has been significant for multiple readmissions 2/2 fatigue, weakness, BOYER, and recurrent ESBL Klebsiella UTIs (most recently treated with 5 days of ertapenem to end 4/16 then started on Cipro x 9 days ending 4/25).  Pt admitted as a transfer from [facility] where she presented early AM 5/6 due to n/v this AM and fevers x several days. Tmax in the .5. Infectious workup initiated in ED - Blood cx NGTD. UA negative for infection. Will repeat UA and cx as pt has hx recurrent UTIs. ID consulted (pt had appt w/ Dr Kessler scheduled for 5/7 due to fevers over the weekend). [facility] labs significant for anemia (H/H 6.9/21.0). Pt has hx chronic anemia of indeterminate etiology. Will repeat CBC and check iron studies, folate, B12, LDH, retic, haptoglobin, and Parvovirus PCR. Graft function stable with Cr 1.3 this AM and good UOP.     Ms. Newell is a 59 y.o. year old female who is status post Kidney Transplant - 1/14/2019  (#1).    Her maintenance immunosuppression consists of:   Immunosuppressants (From admission, onward)    Start     Stop Route Frequency Ordered    05/07/19 0945  tacrolimus capsule 1 mg      -- Oral 2 times daily 05/07/19 0838          Interval history:   BP fluctuated overnight, slightly improved with PRN hydralazine 10 mg x 1. Possibly 2/2 IVIG. Completed last dose of IVIG for txt of parvovirus overnight. This morning SBP 140s-150s. Will monitor today.  Hydralazine 10 mg PRN for SBP > 160s. Pain controlled. Overall, she continues to feel better. Afebrile > 48 hours. Completed bowel prep as directed for colonoscopy. Underwent c-scopy/EGD today, unremarkable for acute pathology. Biopsies sent. Stool WBC, Giardia, cryptosporidium, rotavirus negative. GI pathogen panel still pending. Will start imodium per GI recs. CT a/p unremarkable for acute pathology. Parvovirus PCR qual positive 4/15/19. ID on board. Repeat Parvo PCR in process. H&H remains stable WBC trending down despite being cellcept, CMV treatment, and bactrim. Hematology on board. Did not give neupogen yesterday per their recs. ANC improved today without intervention.         Past Medical, Surgical, Family, and Social History:   Unchanged from H&P.    Scheduled Meds:   albuterol sulfate  2.5 mg Nebulization Once    ergocalciferol  50,000 Units Oral Q7 Days    famotidine  20 mg Oral QHS    heparin (porcine)  5,000 Units Subcutaneous Q8H    insulin aspart U-100  5 Units Subcutaneous TIDWM    insulin detemir U-100  24 Units Subcutaneous QHS    levothyroxine  75 mcg Oral Daily    magnesium oxide  800 mg Oral Daily    multivitamin  1 tablet Oral Daily    oxybutynin  10 mg Oral Daily    pantoprazole  40 mg Oral Daily    pentamidine  300 mg Inhalation Once    rosuvastatin  10 mg Oral Daily    tacrolimus  1 mg Oral BID    vilazodone  40 mg Oral Daily     Continuous Infusions:  PRN Meds:acetaminophen, aluminum-magnesium hydroxide-simethicone, calcium carbonate, dextrose 50%, dextrose 50%, diphenhydrAMINE, glucagon (human recombinant), glucose, glucose, hydrALAZINE, insulin aspart U-100, LORazepam, ondansetron, promethazine (PHENERGAN) IVPB, sodium chloride 0.9%, sodium chloride 0.9%    Intake/Output - Last 3 Shifts       05/07 0700 - 05/08 0659 05/08 0700 - 05/09 0659 05/09 0700 - 05/10 0659    P.O. 5262 292 5805    I.V. (mL/kg)   400 (4.4)    Blood  1622.5     Other  2000     Total Intake(mL/kg) 1310  "(13.8) 4412.5 (48.5) 2080 (22.9)    Urine (mL/kg/hr) 2250 (1) 1300 (0.6)     Emesis/NG output 0      Other 0      Stool 0 0     Blood 0      Total Output 2250 1300     Net -940 +3112.5 +2080           Urine Occurrence 3 x 3 x 1000 x    Stool Occurrence 5 x 10 x 10 x    Emesis Occurrence 0 x             Review of Systems   Constitutional: Positive for appetite change. Negative for activity change, chills, diaphoresis and fever.   HENT: Negative for congestion, sinus pressure, sinus pain and trouble swallowing.    Respiratory: Negative for cough, chest tightness and shortness of breath.    Cardiovascular: Negative for chest pain, palpitations and leg swelling.   Gastrointestinal: Positive for diarrhea. Negative for abdominal distention, abdominal pain, constipation, nausea and vomiting.   Genitourinary: Negative for difficulty urinating, dysuria, flank pain and urgency.   Musculoskeletal: Negative for neck pain and neck stiffness.   Skin: Negative for color change, pallor and rash.   Allergic/Immunologic: Positive for immunocompromised state.   Neurological: Negative for dizziness, tremors and light-headedness.   Psychiatric/Behavioral: Negative for behavioral problems, decreased concentration and sleep disturbance. The patient is not nervous/anxious.       Objective:     Vital Signs (Most Recent):  Temp: 98 °F (36.7 °C) (05/09/19 1322)  Pulse: 73 (05/09/19 1322)  Resp: 16 (05/09/19 1322)  BP: (!) 141/65 (05/09/19 1322)  SpO2: 98 % (05/09/19 1322) Vital Signs (24h Range):  Temp:  [97.8 °F (36.6 °C)-98.3 °F (36.8 °C)] 98 °F (36.7 °C)  Pulse:  [52-73] 73  Resp:  [12-20] 16  SpO2:  [95 %-99 %] 98 %  BP: (132-219)/(55-83) 141/65     Weight: 91 kg (200 lb 9.9 oz)  Height: 5' 7" (170.2 cm)  Body mass index is 31.42 kg/m².    Physical Exam   Constitutional: She is oriented to person, place, and time. She appears well-developed. No distress.   HENT:   Head: Normocephalic and atraumatic.   Eyes: Right eye exhibits no " discharge. Left eye exhibits no discharge. No scleral icterus.   Neck: Normal range of motion.   Cardiovascular: Normal rate, regular rhythm, normal heart sounds and intact distal pulses.   Pulmonary/Chest: Effort normal and breath sounds normal. She has no wheezes. She has no rales.   Abdominal: Soft. Bowel sounds are normal. She exhibits no distension. There is no tenderness.   Well healed kidney incision   Musculoskeletal: She exhibits no edema.   Neurological: She is alert and oriented to person, place, and time.   Skin: Skin is warm and dry. She is not diaphoretic. No erythema.   Psychiatric: She has a normal mood and affect. Her behavior is normal. Judgment and thought content normal.   Nursing note and vitals reviewed.      Laboratory:  CBC:   Recent Labs   Lab 05/07/19  0754 05/08/19  0633 05/09/19  0632   WBC 2.66* 1.13* 1.59*   RBC 2.58* 2.97* 3.21*   HGB 7.6* 8.9* 9.5*   HCT 23.4* 26.7* 28.9*    155 158   MCV 91 90 90   MCH 29.5 30.0 29.6   MCHC 32.5 33.3 32.9     CMP:   Recent Labs   Lab 05/06/19  0355 05/07/19  0111 05/07/19  0754 05/08/19  0633 05/09/19  0632   * 156* 130* 154* 136*   CALCIUM 9.0 8.8 8.9 8.9 9.2   ALBUMIN 3.7 3.2* 3.2* 3.0* 3.2*   PROT 6.6 6.1  --   --   --     141 142 139 137   K 4.2 4.2 4.7 4.1 4.4   CO2 21* 24 25 25 26    109 109 108 105   BUN 13 14 13 17 17   CREATININE 1.3 1.1 1.1 1.0 1.2   ALKPHOS 195* 161*  --   --   --    ALT 60* 46*  --   --   --    AST 60* 42*  --   --   --      Labs within the past 24 hours have been reviewed.    Diagnostic Results:  All imaging with in the past 24 hours has been reviewed.     Assessment/Plan:     * Fever  - Admitted as transfer from [facility] ED w/ recurrent fever, Tmax 101.5 at previous hospital.   - [facility] Blood cx 5/6 NGTD  - UA clean in ED, repeat also neg. Urine cx no growth; CXR negative for acute process  - CT a/p unremarkable for acute pathology  - ID consulted, appreciate recs. Stool studies  "unremarkable thus far. Acute hepatitis panel negative.    - Parvo PCR qual 4/15 positive. Treated with IVIG on 5/7 and 5/8 per ID recs. Repeat PCR in process. Will need PCRs checked q 2 weeks  - CMV PCR, and BK virus pending  - Afebrile > 48 hrs    Parvovirus B19 infection  - qualitative PCR + on 4/15/19  - Txt with IVIG on 5/7 and 5/8  - Repeat quant PCR in process  - will need surveillance every 2 weeks    Neutropenia, unspecified  -WBC trending down despite being off cellcept, CMV treatment, and bactrim  - Heme/onc consulted for further assistance  - Held off on neupogen per their recs, ANC improved today without intervention  - continue to monitor      History of recurrent urinary tract infection  - Hx recurrent ESBL UTI, most recently treated w/ ertapenem scheduled to stop 4/16  - Given Cipro x 9 days ending 4/25  - Pt has planned urethrocystoscopy for recurrent UTIs  - UA in ED clean. Repeat UA negative on admit. Urine cx no growth.     Diarrhea  - Cdiff Ag+, PCR neg   - Cryptospordium, rotavirus, stool WBC, and giardia all negative  - GI path panel still pending  - colonoscopy grossly unremarkable for acute pathology, biopsies pending   - Will add imodium PRN      At risk for opportunistic infections  - Pt is scheduled for monthly pentamidine treatments for PCP prophylaxis, will get dose today  - Pt enrolled in CMV prophylaxis drug study (CMV D+/R-); drug on hold. D/w with Dr. Mercado, will likely withdraw from study. Will get weekly CMV PCRs to monitor.  - CMV PCR  5/7 undetectable      Long-term use of immunosuppressant medication  - See "prophylactic immunotherapy."      Status post living-donor kidney transplantation  S/p living kidney transplant 1/14/2019 2/2 DM/HTN (Campath induction, CMV D+/R).  Post op course significant for multiple readmissions 2/2 fatigue, weakness, BOYER, and recurrent ESBL Klebsiella UTIs   - Now admitted as transfer from [facility] ED for fever (Tmax 101.5 today) and n/v   - " "Graft fx stable and adequate UOP.  - Continue strict Is and Os.  - Continue daily RFP.   - see "Fever"    Gastroesophageal reflux disease without esophagitis  - Continue home meds.      Prophylactic immunotherapy  - Continue Prograf. Monitor trough and adjust level as needed to achieve therapeutic dose.  - Cellcept on hold 2/2 recurrent infection and neutropenia.        HLD (hyperlipidemia)  - continue rosuvastatin       Anemia of chronic disease  - [facility] labs significant for anemia (H/H 6.9/21.0).   - Pt has hx chronic anemia (previously likely 2/2 ESRD, but now that renal fx recovered, unclear etiology - possibly due to Parvovirus)  - Iron studies, folate, B12, LDH, retic, and haptoglobin reviewed.    - Received 2 units PRBC on 5/7 and H&H responded appropriately  - Hematology on board, appreciate their assistance    Depression  - Continue home meds.    Uncontrolled type 2 diabetes mellitus, with long-term current use of insulin  - Diabetic diet.  - Endocrine consulted, appreciate recs.  - insulin per endo        Discharge Planning: Not a candidate for discharge at this time.     Darby Rader PA-C  Kidney Transplant  [facility]  "

## 2019-05-09 NOTE — ASSESSMENT & PLAN NOTE
- Admitted as transfer from [facility] ED w/ recurrent fever, Tmax 101.5 at previous hospital.   - [facility] Blood cx 5/6 NGTD  - UA clean in ED, repeat also neg. Urine cx no growth; CXR negative for acute process  - CT a/p unremarkable for acute pathology  - ID consulted, appreciate recs. Stool studies unremarkable thus far. Acute hepatitis panel negative.    - Parvo PCR qual 4/15 positive. Treated with IVIG on 5/7 and 5/8 per ID recs. Repeat PCR in process. Will need PCRs checked q 2 weeks  - CMV PCR, and BK virus pending  - Afebrile > 48 hrs

## 2019-05-09 NOTE — [employer] PATIENT INSTRUCTIONS
Discharge Summary/Instructions after an Endoscopic Procedure  Patient Name: Andra Newell  Patient MRN: [id number]  Patient YOB: 1960  Thursday, May 09, 2019  Fady Ewing MD  RESTRICTIONS:  During your procedure today, you received medications for sedation.  These   medications may affect your judgment, balance and coordination.  Therefore,   for 24 hours, you have the following restrictions:   - DO NOT drive a car, operate machinery, make legal/financial decisions,   sign important papers or drink alcohol.    ACTIVITY:  Today: no heavy lifting, straining or running due to procedural   sedation/anesthesia.  The following day: return to full activity including work.  DIET:  Eat and drink normally unless instructed otherwise.     TREATMENT FOR COMMON SIDE EFFECTS:  - Mild abdominal pain, nausea, belching, bloating or excessive gas:  rest,   eat lightly and use a heating pad.  - Sore Throat: treat with throat lozenges and/or gargle with warm salt   water.  - Because air was used during the procedure, expelling large amounts of air   from your rectum or belching is normal.  - If a bowel prep was taken, you may not have a bowel movement for 1-3 days.    This is normal.  SYMPTOMS TO WATCH FOR AND REPORT TO YOUR PHYSICIAN:  1. Abdominal pain or bloating, other than gas cramps.  2. Chest pain.  3. Back pain.  4. Signs of infection such as: chills or fever occurring within 24 hours   after the procedure.  5. Rectal bleeding, which would show as bright red, maroon, or black stools.   (A tablespoon of blood from the rectum is not serious, especially if   hemorrhoids are present.)  6. Vomiting.  7. Weakness or dizziness.  GO DIRECTLY TO THE NEAREST EMERGENCY ROOM IF YOU HAVE ANY OF THE FOLLOWING:      Difficulty breathing              Chills and/or fever over 101 F   Persistent vomiting and/or vomiting blood   Severe abdominal pain   Severe chest pain   Black, tarry stools   Bleeding- more than one tablespoon   Any  other symptom or condition that you feel may need urgent attention  Your doctor recommends these additional instructions:  If any biopsies were taken, your doctors clinic will contact you in 1 to 2   weeks with any results.  - Return patient to [facility] for ongoing care.   - Advance diet as tolerated.   - Continue present medications.   - Await pathology results.   - Repeat colonoscopy in 5 years for surveillance.  For questions, problems or results please call your physician - Fady Ewing MD at Work:  [phone].  [facility], EMERGENCY ROOM PHONE NUMBER: [phone]  IF A COMPLICATION OR EMERGENCY SITUATION ARISES AND YOU ARE UNABLE TO REACH   YOUR PHYSICIAN - GO DIRECTLY TO THE EMERGENCY ROOM.  Fady Ewing MD  5/9/2019 11:43:36 AM  This report has been verified and signed electronically.  [employer]

## 2019-05-09 NOTE — PROGRESS NOTES
[facility]  Infectious Disease  Progress Note    Patient Name: Andra Newell  MRN: [id number]  Admission Date: 5/7/2019  Length of Stay: 2 days  Attending Physician: Beto Slade Jr., MD  Primary Care Provider: Gita Cohen MD    Isolation Status: No active isolations  Assessment/Plan:      * Fever  58yo woman w/a history of HTN, HLD, DM2, and subsequent ESRD (s/p LRDRT 1/14/2019, CMV D+/R-, campath induction, on maintenance tacro/MMF; c/b anemia with recently diagnosed parvovirus infection with positive PCR 4/2019 and recurrent ESBL Klebsiella UTI's s/p bland cystoureteroscopy on 4/11 by urogynecology) who was admitted on 5/7/2019 with FUO (prior admission for fever last month without source; 1-2 fevers/week), chills, anorexia, dry cough (seemingly due to reflux), nausea, loose stools, persistent anemia (HCT 21), and new overall myelosuppression (WBC 2.6, ) and elevated LFT's. Differential for complex presentation notable for known parvovirus infection and possible secondary infectious etiology (CMV would be highest on list given high risk serostatus, currently on unknown study drug, not viremic but possible tissue invasive disease without viremia). She is stable despite these findings with improvement in fevers following IVIG.     - patient has completed IVIG for parvovirus infection  - await stool studies given acute on chronic diarrhea (including stool cx, C.diff, O&P, shiga toxin, giardia/crypto antigen, norovirus/rotavirus PCR, repeat CMV quant, and stool pathogens panel sent to [place]) -- mostly negative to date  - colonoscopy done today for biopsies which was grossly unremarkable excluding a polyp -- appreciate GI teams assistance  - have contacted Dr. Winslow to review CMV treatment via study -- on hold at present        Anticipated Disposition: pending improvement    Thank you for your consult. I will follow-up with patient. Please contact us if you have any additional  questions.     Huong Chavze MD  Transplant ID Attending  [phone]    Huong Chavez MD  Infectious Disease  [facility]    Subjective:     Principal Problem:Fever    HPI:     Interval History: Feels a bit better today again. Still has loose stools and leukopenia. Fevers improved.    Review of Systems   Constitutional: Positive for appetite change. Negative for activity change, chills, diaphoresis and fever.   HENT: Negative for congestion, sinus pressure, sinus pain and trouble swallowing.    Respiratory: Negative for cough, chest tightness and shortness of breath.    Cardiovascular: Negative for chest pain, palpitations and leg swelling.   Gastrointestinal: Positive for diarrhea. Negative for abdominal distention, abdominal pain, constipation, nausea and vomiting.   Genitourinary: Negative for difficulty urinating, dysuria, flank pain and urgency.   Musculoskeletal: Negative for neck pain and neck stiffness.   Skin: Negative for color change, pallor and rash.   Allergic/Immunologic: Positive for immunocompromised state.   Neurological: Negative for dizziness, tremors and light-headedness.   Psychiatric/Behavioral: Negative for behavioral problems, decreased concentration and sleep disturbance. The patient is not nervous/anxious.      Objective:     Vital Signs (Most Recent):  Temp: 98 °F (36.7 °C) (05/09/19 1322)  Pulse: 82 (05/09/19 1455)  Resp: 16 (05/09/19 1413)  BP: (!) 141/65 (05/09/19 1322)  SpO2: 100 % (05/09/19 1413) Vital Signs (24h Range):  Temp:  [97.8 °F (36.6 °C)-98.3 °F (36.8 °C)] 98 °F (36.7 °C)  Pulse:  [52-82] 82  Resp:  [12-20] 16  SpO2:  [95 %-100 %] 100 %  BP: (132-219)/(55-83) 141/65     Weight: 91 kg (200 lb 9.9 oz)  Body mass index is 31.42 kg/m².    Estimated Creatinine Clearance: 57 mL/min (based on SCr of 1.2 mg/dL).    Physical Exam   Constitutional: She is oriented to person, place, and time. She appears well-developed. No distress.   HENT:   Head: Normocephalic  and atraumatic.   Eyes: Right eye exhibits no discharge. Left eye exhibits no discharge. No scleral icterus.   Neck: Normal range of motion.   Cardiovascular: Normal rate, regular rhythm, normal heart sounds and intact distal pulses.   Pulmonary/Chest: Effort normal and breath sounds normal. She has no wheezes. She has no rales.   Abdominal: Soft. Bowel sounds are normal. She exhibits no distension. There is no tenderness.   Well healed kidney incision   Musculoskeletal: She exhibits no edema.   Neurological: She is alert and oriented to person, place, and time.   Skin: Skin is warm and dry. She is not diaphoretic. No erythema.   Psychiatric: She has a normal mood and affect. Her behavior is normal. Judgment and thought content normal.   Nursing note and vitals reviewed.      Significant Labs:   CBC:   Recent Labs   Lab 05/08/19  0633 05/09/19  0632   WBC 1.13* 1.59*   HGB 8.9* 9.5*   HCT 26.7* 28.9*    158     CMP:   Recent Labs   Lab 05/08/19  0633 05/09/19  0632    137   K 4.1 4.4    105   CO2 25 26   * 136*   BUN 17 17   CREATININE 1.0 1.2   CALCIUM 8.9 9.2   ALBUMIN 3.0* 3.2*   ANIONGAP 6* 6*   EGFRNONAA >60.0 48.9*       Significant Imaging: I have reviewed all pertinent imaging results/findings within the past 24 hours.

## 2019-05-09 NOTE — ASSESSMENT & PLAN NOTE
- [facility] labs significant for anemia (H/H 6.9/21.0).   - Pt has hx chronic anemia (previously likely 2/2 ESRD, but now that renal fx recovered, unclear etiology - possibly due to Parvovirus)  - Iron studies, folate, B12, LDH, retic, and haptoglobin reviewed.    - Received 2 units PRBC on 5/7 and H&H responded appropriately  - Hematology on board, appreciate their assistance

## 2019-05-09 NOTE — DISCHARGE SUMMARY
[facility]  Kidney Transplant  Discharge Summary    Patient Name: Andra Newell  MRN: [id number]  Admission Date: 5/7/2019  Hospital Length of Stay: 3 days  Discharge Date and Time:  05/10/2019 11:15 AM  Attending Physician: Beto Slade Jr., MD   Discharging Provider: Shante Green NP  Primary Care Provider: Gita Cohen MD    HPI/ Hospital course:   Ms. Newell is a 58 y/o F with PMHx of chronic back pain, anemia, and DM/HTN nephropathy ESRD now s/p living kidney transplant 1/14/2019 (Campath induction, CMV D+/R). Her post op course has been significant for multiple readmissions 2/2 fatigue, weakness, BOYER, and recurrent ESBL Klebsiella UTIs (most recently treated with 5 days of ertapenem to end 4/16 then started on Cipro x 9 days ending 4/25).She was admitted as a transfer from [facility] where she presented early AM 5/6 due to n/v and fevers x several days. Tmax in the .5. Infectious workup initiated in ED - Blood cx NGTD. UA negative for infection. [facility] labs significant for anemia (H/H 6.9/21.0).     Transferred to [facility] for further work up of anemia and infection. Repeat UA and culture negative. ID consulted for further assistance (pt had appt w/ Dr Kessler scheduled for 5/7 due to fevers over the weekend). She also reported 2 months of non-bloody diarrhea. Transfused 2 units PRBC and H&H responded. Hemolysis labs and iron studies unremarkable. Parvovirus qualitative PCR on 4/15/16 positive. Per ID, treated for parvovirus with IVIG x 2. Completed on 5/1 and 5/8. Repeat Parvo quant PCR sent on admit prior treatment still in process. Will need quant PCR q 2 weeks for surveillance. H&H has remained stable without intervention since 5/8.  WBC also trended down to 1.12.  Heme/onc consulted for further assistance. Thought likely 2/2 to infection vs medication-induced vs aplastic anemia 2/2 parvovirus. WBC trended up without intervention. Pt enrolled in CMV drug trial.  Medication held on admit. Discussed with Dr. Mercado and decided it would be best for Ms. Newell to withdraw from trial. Will need weekly CMV PCRs. Cellcept held.     Infectious work up unremarkable. CXR negative for acute process. CT a/p negative for acute pathology. Imaging reviewed with surgeon. LFTs slightly elevated on admit - likely 2/2 infection. Acute hepatitis panel negative. BK and CMV negative. C diff negative, Stool WBC negative. Giardia, rotavirus and cryptosporidium negative. GI pathogen panel sent, will need to follow up. Underwent EGD/colonoscopy 5/9, unremarkable for acute pathology. One polyp removed.  Will need repeat colonoscopy in 5 years for surveillance. Biopsies sent. Has remained afebrile > 72 hrs without ABX. ImmuKnow Cylex in process, will need to follow up.    On day of discharge, patient feeling well without complaint. She is tolerating her diet without n/v. Her diarrhea has improved with imodium. She has remained afebrile without intervention. She is stable for discharge. She will need weekly CMV PCRs and q 2 weeks Parvovirus PCRs. Will get weekly labs to include type & screens to monitor H&H. She has met with pharm D and SW. She will follow up with labs and clinic per coordinator. Patient expressed understanding of discharge instructions and importance of follow up.           Final Active Diagnoses:    Diagnosis Date Noted POA    PRINCIPAL PROBLEM:  Fever [R50.9] 05/06/2019 Yes    Parvovirus B19 infection [B34.3] 05/09/2019 Yes    Neutropenia, unspecified [D70.9] 05/08/2019 No    History of recurrent urinary tract infection [Z87.440] 04/12/2019 Yes    Diarrhea [R19.7] 04/04/2019 Yes    Status post living-donor kidney transplantation [Z94.0] 01/15/2019 Not Applicable    At risk for opportunistic infections [Z91.89] 01/15/2019 Yes    Long-term use of immunosuppressant medication [Z79.899] 01/15/2019 Not Applicable    Gastroesophageal reflux disease without esophagitis  [K21.9] 07/30/2018 Yes    Prophylactic immunotherapy [Z29.8] 09/06/2017 Not Applicable    Anemia of chronic disease [D63.8] 07/12/2017 Yes    HLD (hyperlipidemia) [E78.5] 07/12/2017 Yes     Chronic    Depression [F32.9] 01/21/2015 Yes    Uncontrolled type 2 diabetes mellitus, with long-term current use of insulin [E11.65, Z79.4] 09/27/2013 Not Applicable      Problems Resolved During this Admission:       Treatments: iv ig    Consults (From admission, onward)        Status Ordering Provider     Consult to Endocrinology  Once     Provider:  (Not yet assigned)    Completed ABRAM ROCHA     Consult to Infectious Diseases  Once     Provider:  (Not yet assigned)    Completed ABRAM ROCHA     Inpatient consult to Endocrinology  Once     Provider:  (Not yet assigned)    Completed TIFFANY GILLIS     Inpatient consult to Gastroenterology  Once     Provider:  (Not yet assigned)    Completed MAYRA VALENTINE     Inpatient consult to Hematology/Oncology  Once     Provider:  (Not yet assigned)    Completed MAYRA VALENTINE     Inpatient consult to Kidney/Pancreas Transplant Medicine  Once     Provider:  (Not yet assigned)    Completed TIFFANY GILLIS     Inpatient consult to Midline team  Once     Provider:  (Not yet assigned)    Completed TAHMINA JOHNSON JR          Pending Diagnostic Studies:     Procedure Component Value Units Date/Time    Hector Cardenas [[id number]] Collected:  05/08/19 0633    Order Status:  Sent Lab Status:  In process Updated:  05/08/19 0711    Specimen:  Blood     Parvovirus B19 DNA by PCR, Quantitative [[id number]] Collected:  05/07/19 0112    Order Status:  Sent Lab Status:  In process Updated:  05/07/19 0141    Specimen:  Blood     Narrative:       Collection has been rescheduled by RF at 05/07/2019 01:04 Reason: the    Doctor was talking to the patient        Significant Diagnostic Studies: Labs:   CMP   Recent Labs   Lab 05/09/19  0632 05/10/19  0626    137   K  4.4 4.2    107   CO2 26 22*   * 106   BUN 17 16   CREATININE 1.2 1.0   CALCIUM 9.2 8.5*   ALBUMIN 3.2* 3.0*   ANIONGAP 6* 8   ESTGFRAFRICA 56.4* >60.0   EGFRNONAA 48.9* >60.0   , CBC   Recent Labs   Lab 05/09/19  0632 05/10/19  0626   WBC 1.59* 1.78*   HGB 9.5* 8.4*   HCT 28.9* 26.0*    127*    and INR   Lab Results   Component Value Date    INR 1.1 05/07/2019    INR 1.0 01/14/2019    INR 1.0 01/03/2019       Discharged Condition: good    Disposition: Home or Self Care    Follow Up:    Patient Instructions:      Diet diabetic     Notify your health care provider if you experience any of the following:  temperature >100.4     Notify your health care provider if you experience any of the following:  persistent nausea and vomiting or diarrhea     Notify your health care provider if you experience any of the following:  severe uncontrolled pain     Notify your health care provider if you experience any of the following:  difficulty breathing or increased cough     Notify your health care provider if you experience any of the following:  severe persistent headache     Notify your health care provider if you experience any of the following:  worsening rash     Notify your health care provider if you experience any of the following:  persistent dizziness, light-headedness, or visual disturbances     Notify your health care provider if you experience any of the following:  increased confusion or weakness     Activity as tolerated     Medications:  Reconciled Home Medications:      Medication List      CONTINUE taking these medications    biotin 10,000 mcg Cap  Take 10,000 mcg by mouth once daily.     ergocalciferol 50,000 unit Cap  Commonly known as:  ERGOCALCIFEROL  Take 1 capsule (50,000 Units total) by mouth every 7 days. Take on Tues     estradiol 0.01 % (0.1 mg/gram) vaginal cream  Commonly known as:  ESTRACE  Place 0.5 g vaginally twice a week. Apply to the vagina daily for two weeks then twice a  week.     levothyroxine 75 MCG tablet  Commonly known as:  SYNTHROID  TAKE ONE TABLET BY MOUTH ONCE DAILY     LORazepam 2 MG Tab  Commonly known as:  ATIVAN  Take 1 mg by mouth 2 (two) times daily as needed (anxiety). Take for 8 days.     magnesium oxide 400 mg (241.3 mg magnesium) tablet  Commonly known as:  MAG-OX  Take 2 tablets (800 mg total) by mouth once daily.     multivitamin tablet  Commonly known as:  THERAGRAN  Take 1 tablet by mouth once daily.     rosuvastatin 10 MG tablet  Commonly known as:  CRESTOR  Take 1 tablet (10 mg total) by mouth once daily.     VIIBRYD 40 mg Tab tablet  Generic drug:  vilazodone  TAKE ONE TABLET BY MOUTH ONCE DAILY        ASK your doctor about these medications    famotidine 20 MG tablet  Commonly known as:  PEPCID  Take 1 tablet (20 mg total) by mouth 2 (two) times daily.     insulin aspart U-100 100 unit/mL (3 mL) Inpn pen  Commonly known as:  NovoLOG Flexpen U-100 Insulin  Novolog 10 u AC + correction Max TDD 40u     insulin glargine 100 units/mL (3mL) SubQ pen  Commonly known as:  BASAGLAR KWIKPEN U-100 INSULIN  Inject 30 Units into the skin every evening.     INV acyclovir/placebo 400 MG capsule  Take 400 mg by mouth every 12 (twelve) hours. Take as directed by physician. For investigational use only. Study MK 8228-002. IRB#2017.512; Subject# [id number]     INV MK-8228/placebo 480 mg oral tablet  Take 480 mg by mouth once daily. FOR INVESTIGATIONAL USE ONLY. Patient Study# [id number]. Subject will take one 480 mg tablet once daily. Study  8228-002. IRB#2017.512     INV valganciclovir/placebo 450 mg tablet  Take 900 mg by mouth once daily. Take as directed by physician. FOR INVESTIGATIONAL USE ONLY. Study MK 8228-002. IRB#2017.512; Subject# [id number]     oxybutynin 10 MG 24 hr tablet  Commonly known as:  DITROPAN-XL  Take 1 tablet (10 mg total) by mouth once daily.     tacrolimus 0.5 MG Cap  Commonly known as:  PROGRAF  Take 2 capsules (1 mg total) by mouth every 12  (twelve) hours.          Time spent caring for patient (Greater than 1/2 spent in direct face-to-face contact): > 30 minutes    Shante Green NP  Kidney Transplant  [facility]

## 2019-05-09 NOTE — ASSESSMENT & PLAN NOTE
- Hx recurrent ESBL UTI, most recently treated w/ ertapenem scheduled to stop 4/16  - Given Cipro x 9 days ending 4/25  - Pt has planned urethrocystoscopy for recurrent UTIs  - UA in ED clean. Repeat UA negative on admit. Urine cx no growth.

## 2019-05-09 NOTE — [employer] PATIENT INSTRUCTIONS
Discharge Summary/Instructions after an Endoscopic Procedure  Patient Name: Andra Newell  Patient MRN: [id number]  Patient YOB: 1960  Thursday, May 09, 2019  Fady Ewing MD  RESTRICTIONS:  During your procedure today, you received medications for sedation.  These   medications may affect your judgment, balance and coordination.  Therefore,   for 24 hours, you have the following restrictions:   - DO NOT drive a car, operate machinery, make legal/financial decisions,   sign important papers or drink alcohol.    ACTIVITY:  Today: no heavy lifting, straining or running due to procedural   sedation/anesthesia.  The following day: return to full activity including work.  DIET:  Eat and drink normally unless instructed otherwise.     TREATMENT FOR COMMON SIDE EFFECTS:  - Mild abdominal pain, nausea, belching, bloating or excessive gas:  rest,   eat lightly and use a heating pad.  - Sore Throat: treat with throat lozenges and/or gargle with warm salt   water.  - Because air was used during the procedure, expelling large amounts of air   from your rectum or belching is normal.  - If a bowel prep was taken, you may not have a bowel movement for 1-3 days.    This is normal.  SYMPTOMS TO WATCH FOR AND REPORT TO YOUR PHYSICIAN:  1. Abdominal pain or bloating, other than gas cramps.  2. Chest pain.  3. Back pain.  4. Signs of infection such as: chills or fever occurring within 24 hours   after the procedure.  5. Rectal bleeding, which would show as bright red, maroon, or black stools.   (A tablespoon of blood from the rectum is not serious, especially if   hemorrhoids are present.)  6. Vomiting.  7. Weakness or dizziness.  GO DIRECTLY TO THE NEAREST EMERGENCY ROOM IF YOU HAVE ANY OF THE FOLLOWING:      Difficulty breathing              Chills and/or fever over 101 F   Persistent vomiting and/or vomiting blood   Severe abdominal pain   Severe chest pain   Black, tarry stools   Bleeding- more than one tablespoon   Any  other symptom or condition that you feel may need urgent attention  Your doctor recommends these additional instructions:  If any biopsies were taken, your doctors clinic will contact you in 1 to 2   weeks with any results.  - Return patient to [facility] for ongoing care.   - Advance diet as tolerated.   - Continue present medications.   - Await pathology results.  For questions, problems or results please call your physician - Fady Ewing MD at Work:  [phone].  [facility], EMERGENCY ROOM PHONE NUMBER: [phone]  IF A COMPLICATION OR EMERGENCY SITUATION ARISES AND YOU ARE UNABLE TO REACH   YOUR PHYSICIAN - GO DIRECTLY TO THE EMERGENCY ROOM.  Fady Ewing MD  5/9/2019 11:14:30 AM  This report has been verified and signed electronically.  [employer]

## 2019-05-09 NOTE — SUBJECTIVE & OBJECTIVE
"Interval HPI:   Overnight events: NAEON. BG is within goal ranges on current SQ insulin regimen.   Eatin%  Nausea: No  Hypoglycemia and intervention: No  Fever: No  TPN and/or TF: No  If yes, type of TF/TPN and rate: None    BP (!) 142/63 (BP Location: Right arm, Patient Position: Sitting)   Pulse 68   Temp 98.3 °F (36.8 °C) (Oral)   Resp 12   Ht 5' 7" (1.702 m)   Wt 91 kg (200 lb 9.9 oz)   LMP 2012   SpO2 96%   Breastfeeding? No   BMI 31.42 kg/m²     Labs Reviewed and Include    Recent Labs   Lab 19  0632   *   CALCIUM 9.2   ALBUMIN 3.2*      K 4.4   CO2 26      BUN 17   CREATININE 1.2     Lab Results   Component Value Date    WBC 1.59 (LL) 2019    HGB 9.5 (L) 2019    HCT 28.9 (L) 2019    MCV 90 2019     2019     No results for input(s): TSH, FREET4 in the last 168 hours.  Lab Results   Component Value Date    HGBA1C 8.9 (H) 2019    HGBA1C 8.9 (H) 2019       Nutritional status:   Body mass index is 31.42 kg/m².  Lab Results   Component Value Date    ALBUMIN 3.2 (L) 2019    ALBUMIN 3.0 (L) 2019    ALBUMIN 3.2 (L) 2019     No results found for: PREALBUMIN    Estimated Creatinine Clearance: 57 mL/min (based on SCr of 1.2 mg/dL).    Accu-Checks  Recent Labs     19  2122 19  2255 19  0812 19  1122 19  1652 19  2219 19  0850 19  1246 19  2037 19  0810   POCTGLUCOSE 175* 161* 141* 171* 157* 183* 154* 154* 138* 156*       Current Medications and/or Treatments Impacting Glycemic Control  Immunotherapy:    Immunosuppressants         Stop Route Frequency     tacrolimus capsule 1 mg      -- Oral 2 times daily        Steroids:   Hormones (From admission, onward)    None        Pressors:    Autonomic Drugs (From admission, onward)    None        Hyperglycemia/Diabetes Medications:   Antihyperglycemics (From admission, onward)    Start     Stop Route " Frequency Ordered    05/07/19 2100  insulin detemir U-100 pen 24 Units      -- SubQ Nightly 05/07/19 1038    05/07/19 1145  insulin aspart U-100 pen 5 Units      -- SubQ 3 times daily with meals 05/07/19 1038    05/07/19 1138  insulin aspart U-100 pen 1-10 Units      -- SubQ Before meals & nightly PRN 05/07/19 1038

## 2019-05-09 NOTE — SUBJECTIVE & OBJECTIVE
Subjective:   History of Present Illness:  Ms. Newell is a 60 y/o F with PMHx of chronic back pain, anemia, and DM/HTN nephropathy ESRD now s/p living kidney transplant 1/14/2019 (Campath induction, CMV D+/R). Her post op course has been significant for multiple readmissions 2/2 fatigue, weakness, BOYER, and recurrent ESBL Klebsiella UTIs (most recently treated with 5 days of ertapenem to end 4/16 then started on Cipro x 9 days ending 4/25).  Pt admitted as a transfer from [facility] where she presented early AM 5/6 due to n/v this AM and fevers x several days. Tmax in the .5. Infectious workup initiated in ED - Blood cx NGTD. UA negative for infection. Will repeat UA and cx as pt has hx recurrent UTIs. ID consulted (pt had appt w/ Dr Kessler scheduled for 5/7 due to fevers over the weekend). [facility] labs significant for anemia (H/H 6.9/21.0). Pt has hx chronic anemia of indeterminate etiology. Will repeat CBC and check iron studies, folate, B12, LDH, retic, haptoglobin, and Parvovirus PCR. Graft function stable with Cr 1.3 this AM and good UOP.     Ms. Newell is a 59 y.o. year old female who is status post Kidney Transplant - 1/14/2019  (#1).    Her maintenance immunosuppression consists of:   Immunosuppressants (From admission, onward)    Start     Stop Route Frequency Ordered    05/07/19 0945  tacrolimus capsule 1 mg      -- Oral 2 times daily 05/07/19 0838          Interval history:   BP fluctuated overnight, slightly improved with PRN hydralazine 10 mg x 1. Possibly 2/2 IVIG. Completed last dose of IVIG for txt of parvovirus overnight. This morning SBP 140s-150s. Will monitor today. Hydralazine 10 mg PRN for SBP > 160s. Pain controlled. Overall, she continues to feel better. Afebrile > 48 hours. Completed bowel prep as directed for colonoscopy. Underwent c-scopy/EGD today, unremarkable for acute pathology. Biopsies sent. Stool WBC, Giardia, cryptosporidium, rotavirus negative. GI pathogen panel  still pending. Will start imodium per GI recs. CT a/p unremarkable for acute pathology. Parvovirus PCR qual positive 4/15/19. ID on board. Repeat Parvo PCR in process. H&H remains stable WBC trending down despite being cellcept, CMV treatment, and bactrim. Hematology on board. Did not give neupogen yesterday per their recs. ANC improved today without intervention.         Past Medical, Surgical, Family, and Social History:   Unchanged from H&P.    Scheduled Meds:   albuterol sulfate  2.5 mg Nebulization Once    ergocalciferol  50,000 Units Oral Q7 Days    famotidine  20 mg Oral QHS    heparin (porcine)  5,000 Units Subcutaneous Q8H    insulin aspart U-100  5 Units Subcutaneous TIDWM    insulin detemir U-100  24 Units Subcutaneous QHS    levothyroxine  75 mcg Oral Daily    magnesium oxide  800 mg Oral Daily    multivitamin  1 tablet Oral Daily    oxybutynin  10 mg Oral Daily    pantoprazole  40 mg Oral Daily    pentamidine  300 mg Inhalation Once    rosuvastatin  10 mg Oral Daily    tacrolimus  1 mg Oral BID    vilazodone  40 mg Oral Daily     Continuous Infusions:  PRN Meds:acetaminophen, aluminum-magnesium hydroxide-simethicone, calcium carbonate, dextrose 50%, dextrose 50%, diphenhydrAMINE, glucagon (human recombinant), glucose, glucose, hydrALAZINE, insulin aspart U-100, LORazepam, ondansetron, promethazine (PHENERGAN) IVPB, sodium chloride 0.9%, sodium chloride 0.9%    Intake/Output - Last 3 Shifts       05/07 0700 - 05/08 0659 05/08 0700 - 05/09 0659 05/09 0700 - 05/10 0659    P.O. [phone]    I.V. (mL/kg)   400 (4.4)    Blood  1622.5     Other  2000     Total Intake(mL/kg) 1310 (13.8) 4412.5 (48.5) 2080 (22.9)    Urine (mL/kg/hr) 2250 (1) 1300 (0.6)     Emesis/NG output 0      Other 0      Stool 0 0     Blood 0      Total Output 2250 1300     Net -940 +3112.5 +2080           Urine Occurrence 3 x 3 x 1000 x    Stool Occurrence 5 x 10 x 10 x    Emesis Occurrence 0 x             Review of  "Systems   Constitutional: Positive for appetite change. Negative for activity change, chills, diaphoresis and fever.   HENT: Negative for congestion, sinus pressure, sinus pain and trouble swallowing.    Respiratory: Negative for cough, chest tightness and shortness of breath.    Cardiovascular: Negative for chest pain, palpitations and leg swelling.   Gastrointestinal: Positive for diarrhea. Negative for abdominal distention, abdominal pain, constipation, nausea and vomiting.   Genitourinary: Negative for difficulty urinating, dysuria, flank pain and urgency.   Musculoskeletal: Negative for neck pain and neck stiffness.   Skin: Negative for color change, pallor and rash.   Allergic/Immunologic: Positive for immunocompromised state.   Neurological: Negative for dizziness, tremors and light-headedness.   Psychiatric/Behavioral: Negative for behavioral problems, decreased concentration and sleep disturbance. The patient is not nervous/anxious.       Objective:     Vital Signs (Most Recent):  Temp: 98 °F (36.7 °C) (05/09/19 1322)  Pulse: 73 (05/09/19 1322)  Resp: 16 (05/09/19 1322)  BP: (!) 141/65 (05/09/19 1322)  SpO2: 98 % (05/09/19 1322) Vital Signs (24h Range):  Temp:  [97.8 °F (36.6 °C)-98.3 °F (36.8 °C)] 98 °F (36.7 °C)  Pulse:  [52-73] 73  Resp:  [12-20] 16  SpO2:  [95 %-99 %] 98 %  BP: (132-219)/(55-83) 141/65     Weight: 91 kg (200 lb 9.9 oz)  Height: 5' 7" (170.2 cm)  Body mass index is 31.42 kg/m².    Physical Exam   Constitutional: She is oriented to person, place, and time. She appears well-developed. No distress.   HENT:   Head: Normocephalic and atraumatic.   Eyes: Right eye exhibits no discharge. Left eye exhibits no discharge. No scleral icterus.   Neck: Normal range of motion.   Cardiovascular: Normal rate, regular rhythm, normal heart sounds and intact distal pulses.   Pulmonary/Chest: Effort normal and breath sounds normal. She has no wheezes. She has no rales.   Abdominal: Soft. Bowel sounds are " normal. She exhibits no distension. There is no tenderness.   Well healed kidney incision   Musculoskeletal: She exhibits no edema.   Neurological: She is alert and oriented to person, place, and time.   Skin: Skin is warm and dry. She is not diaphoretic. No erythema.   Psychiatric: She has a normal mood and affect. Her behavior is normal. Judgment and thought content normal.   Nursing note and vitals reviewed.      Laboratory:  CBC:   Recent Labs   Lab 05/07/19  0754 05/08/19  0633 05/09/19  0632   WBC 2.66* 1.13* 1.59*   RBC 2.58* 2.97* 3.21*   HGB 7.6* 8.9* 9.5*   HCT 23.4* 26.7* 28.9*    155 158   MCV 91 90 90   MCH 29.5 30.0 29.6   MCHC 32.5 33.3 32.9     CMP:   Recent Labs   Lab 05/06/19  0355 05/07/19  0111 05/07/19  0754 05/08/19  0633 05/09/19  0632   * 156* 130* 154* 136*   CALCIUM 9.0 8.8 8.9 8.9 9.2   ALBUMIN 3.7 3.2* 3.2* 3.0* 3.2*   PROT 6.6 6.1  --   --   --     141 142 139 137   K 4.2 4.2 4.7 4.1 4.4   CO2 21* 24 25 25 26    109 109 108 105   BUN 13 14 13 17 17   CREATININE 1.3 1.1 1.1 1.0 1.2   ALKPHOS 195* 161*  --   --   --    ALT 60* 46*  --   --   --    AST 60* 42*  --   --   --      Labs within the past 24 hours have been reviewed.    Diagnostic Results:  All imaging with in the past 24 hours has been reviewed.

## 2019-05-09 NOTE — SUBJECTIVE & OBJECTIVE
Interval History: Feels a bit better today again. Still has loose stools and leukopenia. Fevers improved.    Review of Systems   Constitutional: Positive for appetite change. Negative for activity change, chills, diaphoresis and fever.   HENT: Negative for congestion, sinus pressure, sinus pain and trouble swallowing.    Respiratory: Negative for cough, chest tightness and shortness of breath.    Cardiovascular: Negative for chest pain, palpitations and leg swelling.   Gastrointestinal: Positive for diarrhea. Negative for abdominal distention, abdominal pain, constipation, nausea and vomiting.   Genitourinary: Negative for difficulty urinating, dysuria, flank pain and urgency.   Musculoskeletal: Negative for neck pain and neck stiffness.   Skin: Negative for color change, pallor and rash.   Allergic/Immunologic: Positive for immunocompromised state.   Neurological: Negative for dizziness, tremors and light-headedness.   Psychiatric/Behavioral: Negative for behavioral problems, decreased concentration and sleep disturbance. The patient is not nervous/anxious.      Objective:     Vital Signs (Most Recent):  Temp: 98 °F (36.7 °C) (05/09/19 1322)  Pulse: 82 (05/09/19 1455)  Resp: 16 (05/09/19 1413)  BP: (!) 141/65 (05/09/19 1322)  SpO2: 100 % (05/09/19 1413) Vital Signs (24h Range):  Temp:  [97.8 °F (36.6 °C)-98.3 °F (36.8 °C)] 98 °F (36.7 °C)  Pulse:  [52-82] 82  Resp:  [12-20] 16  SpO2:  [95 %-100 %] 100 %  BP: (132-219)/(55-83) 141/65     Weight: 91 kg (200 lb 9.9 oz)  Body mass index is 31.42 kg/m².    Estimated Creatinine Clearance: 57 mL/min (based on SCr of 1.2 mg/dL).    Physical Exam   Constitutional: She is oriented to person, place, and time. She appears well-developed. No distress.   HENT:   Head: Normocephalic and atraumatic.   Eyes: Right eye exhibits no discharge. Left eye exhibits no discharge. No scleral icterus.   Neck: Normal range of motion.   Cardiovascular: Normal rate, regular rhythm, normal heart  sounds and intact distal pulses.   Pulmonary/Chest: Effort normal and breath sounds normal. She has no wheezes. She has no rales.   Abdominal: Soft. Bowel sounds are normal. She exhibits no distension. There is no tenderness.   Well healed kidney incision   Musculoskeletal: She exhibits no edema.   Neurological: She is alert and oriented to person, place, and time.   Skin: Skin is warm and dry. She is not diaphoretic. No erythema.   Psychiatric: She has a normal mood and affect. Her behavior is normal. Judgment and thought content normal.   Nursing note and vitals reviewed.      Significant Labs:   CBC:   Recent Labs   Lab 05/08/19  0633 05/09/19  0632   WBC 1.13* 1.59*   HGB 8.9* 9.5*   HCT 26.7* 28.9*    158     CMP:   Recent Labs   Lab 05/08/19  0633 05/09/19  0632    137   K 4.1 4.4    105   CO2 25 26   * 136*   BUN 17 17   CREATININE 1.0 1.2   CALCIUM 8.9 9.2   ALBUMIN 3.0* 3.2*   ANIONGAP 6* 6*   EGFRNONAA >60.0 48.9*       Significant Imaging: I have reviewed all pertinent imaging results/findings within the past 24 hours.

## 2019-05-09 NOTE — ASSESSMENT & PLAN NOTE
- Pt is scheduled for monthly pentamidine treatments for PCP prophylaxis, will get dose today  - Pt enrolled in CMV prophylaxis drug study (CMV D+/R-); drug on hold. D/w with Dr. Mercado, will likely withdraw from study. Will get weekly CMV PCRs to monitor.  - CMV PCR  5/7 undetectable

## 2019-05-09 NOTE — ASSESSMENT & PLAN NOTE
- Cdiff Ag+, PCR neg   - Cryptospordium, rotavirus, stool WBC, and giardia all negative  - GI path panel still pending  - colonoscopy grossly unremarkable for acute pathology, biopsies pending   - Will add imodium PRN

## 2019-05-09 NOTE — TRANSFER OF CARE
"Anesthesia Transfer of Care Note    Patient: Andra Newell    Procedure(s) Performed: Procedure(s) (LRB):  EGD (ESOPHAGOGASTRODUODENOSCOPY) (N/A)  COLONOSCOPY (N/A)    Patient location: [facility]    Anesthesia Type: general    Transport from OR: Transported from OR on 2-3 L/min O2 by NC with adequate spontaneous ventilation    Post pain: adequate analgesia    Post assessment: no apparent anesthetic complications and tolerated procedure well    Post vital signs: stable    Level of consciousness: awake    Nausea/Vomiting: no nausea/vomiting    Complications: none    Transfer of care protocol was followed      Last vitals:   Visit Vitals  BP (!) 181/72 (BP Location: Left arm, Patient Position: Lying)   Pulse 70   Temp 36.6 °C (97.9 °F) (Temporal)   Resp 16   Ht 5' 7" (1.702 m)   Wt 91 kg (200 lb 9.9 oz)   LMP 02/28/2012   SpO2 97%   Breastfeeding? No   BMI 31.42 kg/m²     "

## 2019-05-09 NOTE — ANESTHESIA POSTPROCEDURE EVALUATION
Anesthesia Post Evaluation    Patient: Andra Newell    Procedure(s) Performed: Procedure(s) (LRB):  EGD (ESOPHAGOGASTRODUODENOSCOPY) (N/A)  COLONOSCOPY (N/A)    Final Anesthesia Type: general  Patient location during evaluation: PACU  Patient participation: Yes- Able to Participate  Level of consciousness: awake and alert and oriented  Post-procedure vital signs: reviewed and stable  Pain management: adequate  Airway patency: patent  PONV status at discharge: No PONV  Anesthetic complications: no      Cardiovascular status: blood pressure returned to baseline  Respiratory status: unassisted, spontaneous ventilation and room air  Hydration status: euvolemic  Follow-up not needed.          Vitals Value Taken Time   /55 5/9/2019 12:45 PM   Temp 36.6 °C (97.9 °F) 5/9/2019 11:55 AM   Pulse 54 5/9/2019 12:45 PM   Resp 16 5/9/2019 12:45 PM   SpO2 96 % 5/9/2019 12:45 PM         No case tracking events are documented in the log.      Pain/Asmita Score: Pain Rating Prior to Med Admin: 5 (5/9/2019  9:17 AM)  Pain Rating Post Med Admin: 3 (5/9/2019  9:53 AM)

## 2019-05-09 NOTE — NURSING TRANSFER
Nursing Transfer Note      5/9/2019     Transfer [id number] from [facility]# [id number]    Transfer via stretcher    Transfer with tele monitor on    Transported by transport    Medicines sent: no    Chart send with patient:yes    Notified: staff nurse    Patient reassessed at: arrival to floor    Upon arrival to floor: assess

## 2019-05-09 NOTE — ASSESSMENT & PLAN NOTE
60yo woman w/a history of HTN, HLD, DM2, and subsequent ESRD (s/p LRDRT 1/14/2019, CMV D+/R-, campath induction, on maintenance tacro/MMF; c/b anemia with recently diagnosed parvovirus infection with positive PCR 4/2019 and recurrent ESBL Klebsiella UTI's s/p bland cystoureteroscopy on 4/11 by urogynecology) who was admitted on 5/7/2019 with FUO (prior admission for fever last month without source; 1-2 fevers/week), chills, anorexia, dry cough (seemingly due to reflux), nausea, loose stools, persistent anemia (HCT 21), and new overall myelosuppression (WBC 2.6, ) and elevated LFT's. Differential for complex presentation notable for known parvovirus infection and possible secondary infectious etiology (CMV would be highest on list given high risk serostatus, currently on unknown study drug, not viremic but possible tissue invasive disease without viremia). She is stable despite these findings with improvement in fevers following IVIG.     - patient has completed IVIG for parvovirus infection  - await stool studies given acute on chronic diarrhea (including stool cx, C.diff, O&P, shiga toxin, giardia/crypto antigen, norovirus/rotavirus PCR, repeat CMV quant, and stool pathogens panel sent to [place]) -- mostly negative to date  - colonoscopy done today for biopsies which was grossly unremarkable excluding a polyp -- appreciate GI teams assistance  - have contacted Dr. Winslow to review CMV treatment via study -- on hold at present

## 2019-05-09 NOTE — PT/OT/SLP EVAL
"Physical Therapy Evaluation and Discharge Note    Patient Name:  Andra Newell   MRN:  [id number]    Recommendations:     Discharge Recommendations:  outpatient PT   Discharge Equipment Recommendations: none   Barriers to discharge: None    Assessment:     Andra Newell is a 59 y.o. female admitted with a medical diagnosis of Fever. Pt is s/p EGD as of 5/9/19.  Pt is to be discharged at this time as pt is Independent with all functional mobility assessed at time of evaluation.      Recommendation for PT services in the outpatient setting as pt reports multiple falls in the past year.  Reports of dizziness at night.  Pt also reports and demonstrated deficits in aerobic capacity, amb distances <500'.  Pt unable to complete 6MWT.      Recent Surgery: Procedure(s) (LRB):  EGD (ESOPHAGOGASTRODUODENOSCOPY) (N/A)  COLONOSCOPY (N/A) Day of Surgery    Plan:     During this hospitalization, patient does not require further acute PT services.  Please re-consult if situation changes.      Subjective     Chief Complaint: falls  Patient/Family Comments/goals: To be able to amb longer distances  Pain/Comfort:  · Pain Rating 1: 0/10    Patients cultural, spiritual, Christianity conflicts given the current situation: no    Living Environment:  Pt lives with spouse, SSH, ramp present outside to entrance.   Prior to admission, patients level of function was I with amb, use of power scooter for long distances.  Equipment used at home: power scooter and glucometer.  DME owned (not currently used): rolling walker, single point cane, bedside commode, transfer tub bench and grab bars.  Upon discharge, patient will have assistance from spouse.    Objective:     Communicated with nursing prior to session.  Patient found right sidelying with peripheral IV(AV fistula) upon PT entry to room.    "My equilibrium is not right."  "It's more at night, I sway into the wall."    General Precautions: Standard, fall   Orthopedic " Precautions:N/A   Braces: N/A     Exams:  · Cognitive Exam:  Patient is oriented to Person, Place, Time and Situation  · Fine Motor Coordination:    · -       Intact  Left hand thumb/finger opposition skills and Right hand thumb/finger opposition skills  · Gross Motor Coordination:  WFL  · Postural Exam:  Patient presented with the following abnormalities:    · -       Rounded shoulders  · -       minimal trunk flex in stance  · Sensation:    · -       Intact  light/touch B LEs  · Skin Integrity/Edema:      · -       Skin integrity: Visible skin intact  · -       Edema: None noted B LEs  · RUE ROM: WFL  · RUE Strength: WFL  · LUE ROM: WFL  · LUE Strength: WFL  · RLE ROM: WFL  · RLE Strength: WFL  · LLE ROM: WFL  · LLE Strength: WFL    Functional Mobility:  · Bed Mobility:     · Rolling Left:  independence  · Rolling Right: independence  · Scooting: independence  · Supine to Sit: independence  · Sit to Supine: independence  · Transfers:     · Sit to Stand:  independence with no AD  · Bed to Chair: independence with  no AD  using  Stand Pivot  · Toilet Transfer: independence with  no AD  using  Stand Pivot  · Gait: Pt amb >200', I, without AD, no episodes of LOB, BOYER noted with 1 sitting rest break required  · Balance: I: dynamic standing balance without AD    AM-PAC 6 CLICK MOBILITY  Total Score:24       Therapeutic Activities and Exercises:   PT POC established with pt  Whiteboard updated  Unable to complete 6MWT  Ed on safety behavioral changes with night routine, encouraged use of BSC, ed on waiting 30 sec with bed behind pt prior to amb towards bathroom    AM-PAC 6 CLICK MOBILITY  Total Score:24     Patient left supine with all lines intact and call button in reach.    GOALS:   Multidisciplinary Problems     Physical Therapy Goals     Not on file                History:     Past Medical History:   Diagnosis Date    Anemia     Anemia in chronic kidney disease, on chronic dialysis 7/12/2017    Arthritis      Asthma     allergic airway    CKD stage III (moderate) 2/18/2019    Colon polyp     Depression     Diabetes mellitus     Diverticulosis     Eosinophilia     ESRD (end stage renal disease)     stage V, due for living donor 9/2018    ESRD on dialysis     GERD (gastroesophageal reflux disease)     Gout     Gout     Hyperlipidemia     Hypertension     Low back pain     MRSA carrier     Obesity     Renal disorder     Rotator cuff syndrome--left     Thyroid disease     Ulnar neuropathy of right upper extremity     Uncontrolled type 2 diabetes mellitus with hyperglycemia        Past Surgical History:   Procedure Laterality Date    ACHILLES TENDON SURGERY Left May 2015    ARTHROSCOPY, HIP Left 10/3/2013    [facility]    ARTHROSCOPY-HIP WITH TROCHANTERIC BURSECTOMY Left 10/3/2013    [facility] at [facility] OR    ARTHROSCOPY-SHOULDER Left 11/24/2015    [facility] OR    ARTHROSCOPY-SHOULDER WITH SUBACROMIAL DECOMPRESSION Left 7/12/2016    [facility] at [facility] OR    BACK SURGERY      CHOLECYSTECTOMY      COLONOSCOPY N/A 9/6/2017    [facility]    DEBRIDEMENT-SHOULDER-ARTHROSCOPIC Left 7/12/2016    [facility] OR    DEBRIDEMENT-SHOULDER-ARTHROSCOPIC; LABRAL Left 11/24/2015    [facility] at [facility] OR    DIALYSIS FISTULA CREATION  12/2017    FEMOROPLASTY, ARTHROSCOPIC Left 10/3/2013    [facility] OR    HEMORRHOID SURGERY      INJECTION-AMNIOX Left 7/12/2016    [facility] OR    JOINT REPLACEMENT      left    KNEE SURGERY      LYSIS-ADHESION Left 11/24/2015    [facility] OR    REPAIR ROTATOR CUFF ARTHROSCOPIC Left 7/12/2016    [facility] at [facility] OR    REPAIR-TENDON-BICEP Left 11/24/2015    Performed by Moe TEMPLE  MD [place] [facility] [place] [place]    SHOULDER ARTHROSCOPY      SHOULDER SURGERY      TRANSPLANT, KIDNEY N/A 1/14/2019    [facility]    UPPER GASTROINTESTINAL ENDOSCOPY  ~2013     [place]       Time Tracking:     PT Received On: 05/09/19  PT Start Time: 1635     PT Stop Time: 1650  PT Total Time (min): 15 min     Billable Minutes: Evaluation 7 and Gait Training 8      Rachele Simons, PT  05/09/2019

## 2019-05-09 NOTE — ASSESSMENT & PLAN NOTE
BG goal 140 - 180    Appetite remains variable.     Continue Levemir 24 units HS. 20% reduction from home dosing.     Continue Novolog 5 units TIDWM. 20% reduction from home dosing.     Moderate Dose SQ Insulin Correction Scale - given insulin resistance     ** Please call Endocrine for any BG related issues **  ** Please notify Endocrine for any change and/or advance in diet**    Discharge Recommendations:   Resume home MDI regimen   Basaglar 30 units q hs, Novolog 6 units TID with meals, and the following Novolog correction scale   150 - 200 + 1 unit  201 - 250 + 2 units  251 - 300 + 3 units  301 - 350 + 4 units   > 350   + 5 units   Patient will need to continue to keep BG logs, and follow-up with Mana Webber NP for insulin adjustments.

## 2019-05-09 NOTE — PLAN OF CARE
"Problem: Adult Inpatient Plan of Care  Goal: Plan of Care Review  Outcome: Ongoing (interventions implemented as appropriate)  Pt AAOx4, VSS, afebrile. Pt down for EGD/colonoscopy today which was unremarkable for acute pathology. Polyp removed & bx sent. GI panel pending. Repeat Parvo PCR in process. WBC up to 1.59 today from 1.13, no neupogen administered thus far. Immodium PRN ordered Q8H. BP's in 130-140 range when pt is in sitting position and "Adult Large" BP cuff is being used. Pt to receive monthly dose of pentamidine tx for PCP prophylaxis, pt received dose today. Will get weekly CMV PCRs to monitor. Bed in low/locked position, call ligth/personal belongings within reach, non-slip socks on while ambulating, WCTM.              "

## 2019-05-09 NOTE — PLAN OF CARE
Problem: Adult Inpatient Plan of Care  Goal: Plan of Care Review  Outcome: Ongoing (interventions implemented as appropriate)  Pt has call bell in reach, non slip socks on, and bedrails up x2. Pt encouraged to wash hands. Pt has gotten meds for acid reflux. Pt is drinking her go lytely according to orders for scope in am. Pt is having multiple bms. Pt has accuchecks ac/hs.

## 2019-05-09 NOTE — CARE UPDATE
Pt in procedure during rounds.    BG goal 140 - 180     Continue Levemir 24 units q HS.  Continue Novolog 5 units TIDWM - hold if NPO   Moderate Dose SQ Insulin Correction Scale     ** Please call Endocrine for any BG related issues **

## 2019-05-09 NOTE — ASSESSMENT & PLAN NOTE
"S/p living kidney transplant 1/14/2019 2/2 DM/HTN (Campath induction, CMV D+/R).  Post op course significant for multiple readmissions 2/2 fatigue, weakness, BOYER, and recurrent ESBL Klebsiella UTIs   - Now admitted as transfer from [facility] ED for fever (Tmax 101.5 today) and n/v   - Graft fx stable and adequate UOP.  - Continue strict Is and Os.  - Continue daily RFP.   - see "Fever"  "

## 2019-05-09 NOTE — SIGNIFICANT EVENT
Pt bp 219/81 standing with a pulse of 54. I notified MERCEDES Murillo. She said to give the 10mg hydralazine iv push. Pt bp came down to 161/70.

## 2019-05-09 NOTE — TREATMENT PLAN
GI Treatment Plan    Andra Newell is a 59 y.o. female admitted to hospital 5/7/2019 (Hospital Day: 3) due to Fever.     Interval History  EGD  - normal esophagus  - nodular mucosa in prepyloric stomach. Biopsied  - normal duodenum. biopsied    Colonoscopy  - diverticulosis  - sigmoid polyp (3mm)  - TI normal  - colon appears normal  - random biopsied obtained    Plan  - s/p EGD and colonoscopy, see formal procedure note  - no obvious cause of patient's n/v/d on scopes.  - follow-up pending gastric, duodenual, and colonic biopsies  - can trial imodium for diarrhea  - Plan of care was discussed with primary team.  - We are signing-off. Please call with any questions.    Thank you for involving us in the care of Andra Newell. Please call with any additional questions, concerns or changes in the patient's clinical status.    Chapincito Alaniz MD  Gastroenterology Fellow, PGY IV  Spectralink: [id number]

## 2019-05-10 ENCOUNTER — RESEARCH ENCOUNTER (OUTPATIENT)
Dept: RESEARCH | Facility: HOSPITAL | Age: 59
End: 2019-05-10

## 2019-05-10 ENCOUNTER — RESEARCH ENCOUNTER (OUTPATIENT)
Dept: RESEARCH | Facility: CLINIC | Age: 59
End: 2019-05-10
Payer: MEDICARE

## 2019-05-10 VITALS
BODY MASS INDEX: 31.83 KG/M2 | SYSTOLIC BLOOD PRESSURE: 145 MMHG | OXYGEN SATURATION: 99 % | TEMPERATURE: 99 F | WEIGHT: 202.81 LBS | HEART RATE: 74 BPM | DIASTOLIC BLOOD PRESSURE: 64 MMHG | HEIGHT: 67 IN | RESPIRATION RATE: 18 BRPM

## 2019-05-10 DIAGNOSIS — Z00.6 RESEARCH STUDY PATIENT: ICD-10-CM

## 2019-05-10 DIAGNOSIS — Z94.0 KIDNEY REPLACED BY TRANSPLANT: Primary | ICD-10-CM

## 2019-05-10 LAB
ABO + RH BLD: NORMAL
ALBUMIN SERPL BCP-MCNC: 3 G/DL (ref 3.5–5.2)
ANION GAP SERPL CALC-SCNC: 8 MMOL/L (ref 8–16)
ANISOCYTOSIS BLD QL SMEAR: SLIGHT
BASO STIPL BLD QL SMEAR: ABNORMAL
BASOPHILS # BLD AUTO: 0.01 K/UL (ref 0–0.2)
BASOPHILS NFR BLD: 0.6 % (ref 0–1.9)
BLD GP AB SCN CELLS X3 SERPL QL: NORMAL
BUN SERPL-MCNC: 16 MG/DL (ref 8–23)
CALCIUM SERPL-MCNC: 8.5 MG/DL (ref 8.7–10.5)
CHLORIDE SERPL-SCNC: 107 MMOL/L (ref 95–110)
CO2 SERPL-SCNC: 22 MMOL/L (ref 23–29)
CREAT SERPL-MCNC: 1 MG/DL (ref 0.5–1.4)
DIFFERENTIAL METHOD: ABNORMAL
EOSINOPHIL # BLD AUTO: 0 K/UL (ref 0–0.5)
EOSINOPHIL NFR BLD: 0.6 % (ref 0–8)
ERYTHROCYTE [DISTWIDTH] IN BLOOD BY AUTOMATED COUNT: 15.6 % (ref 11.5–14.5)
EST. GFR  (AFRICAN AMERICAN): >60 ML/MIN/1.73 M^2
EST. GFR  (NON AFRICAN AMERICAN): >60 ML/MIN/1.73 M^2
GLUCOSE SERPL-MCNC: 106 MG/DL (ref 70–110)
GPP - ADENOVIRUS 40/41: NOT DETECTED
GPP - CAMPYLOBACTER: NOT DETECTED
GPP - CLOSTRIDIUM DIFFICILE TOXIN A/B: NOT DETECTED
GPP - CRYPTOSPORIDIUM: NOT DETECTED
GPP - E COLI O157: NOT DETECTED
GPP - ENTAMOEBA HISTOLYTICA: NOT DETECTED
GPP - ENTEROTOXIGENIC E COLI (ETEC): NOT DETECTED
GPP - GIARDIA LAMBLIA: NOT DETECTED
GPP - NOROVIRUS GI/GII: NOT DETECTED
GPP - ROTAVIRUS A: NOT DETECTED
GPP - SALMONELLA: NOT DETECTED
GPP - SHIGELLA: NOT DETECTED
GPP - VIBRIO CHOLERA: NOT DETECTED
GPP - YERSINIA ENTEROCOLITICA: NOT DETECTED
HCT VFR BLD AUTO: 26 % (ref 37–48.5)
HGB BLD-MCNC: 8.4 G/DL (ref 12–16)
IMM GRANULOCYTES # BLD AUTO: 0.02 K/UL (ref 0–0.04)
IMM GRANULOCYTES NFR BLD AUTO: 1.1 % (ref 0–0.5)
LACTATE PLASV-SCNC: NOT DETECTED MMOL/L
LYMPHOCYTES # BLD AUTO: 0.3 K/UL (ref 1–4.8)
LYMPHOCYTES NFR BLD: 17.4 % (ref 18–48)
MAGNESIUM SERPL-MCNC: 1.5 MG/DL (ref 1.6–2.6)
MCH RBC QN AUTO: 29.7 PG (ref 27–31)
MCHC RBC AUTO-ENTMCNC: 32.3 G/DL (ref 32–36)
MCV RBC AUTO: 92 FL (ref 82–98)
MONOCYTES # BLD AUTO: 0.5 K/UL (ref 0.3–1)
MONOCYTES NFR BLD: 26.4 % (ref 4–15)
NEUTROPHILS # BLD AUTO: 1 K/UL (ref 1.8–7.7)
NEUTROPHILS NFR BLD: 53.9 % (ref 38–73)
NRBC BLD-RTO: 0 /100 WBC
PHOSPHATE SERPL-MCNC: 3.5 MG/DL (ref 2.7–4.5)
PLATELET # BLD AUTO: 127 K/UL (ref 150–350)
PLATELET BLD QL SMEAR: ABNORMAL
PMV BLD AUTO: 10.7 FL (ref 9.2–12.9)
POCT GLUCOSE: 114 MG/DL (ref 70–110)
POCT GLUCOSE: 152 MG/DL (ref 70–110)
POTASSIUM SERPL-SCNC: 4.2 MMOL/L (ref 3.5–5.1)
RBC # BLD AUTO: 2.83 M/UL (ref 4–5.4)
SODIUM SERPL-SCNC: 137 MMOL/L (ref 136–145)
TACROLIMUS BLD-MCNC: 6.3 NG/ML (ref 5–15)
TOXIC GRANULES BLD QL SMEAR: PRESENT
WBC # BLD AUTO: 1.78 K/UL (ref 3.9–12.7)

## 2019-05-10 PROCEDURE — 99232 SBSQ HOSP IP/OBS MODERATE 35: CPT | Mod: ,,, | Performed by: NURSE PRACTITIONER

## 2019-05-10 PROCEDURE — 80197 ASSAY OF TACROLIMUS: CPT

## 2019-05-10 PROCEDURE — 99239 HOSP IP/OBS DSCHRG MGMT >30: CPT | Mod: ,,, | Performed by: NURSE PRACTITIONER

## 2019-05-10 PROCEDURE — 99239 PR HOSPITAL DISCHARGE DAY,>30 MIN: ICD-10-PCS | Mod: ,,, | Performed by: NURSE PRACTITIONER

## 2019-05-10 PROCEDURE — 99233 PR SUBSEQUENT HOSPITAL CARE,LEVL III: ICD-10-PCS | Mod: GC,,, | Performed by: INTERNAL MEDICINE

## 2019-05-10 PROCEDURE — 99212 PR OFFICE/OUTPT VISIT, EST, LEVL II, 10-19 MIN: ICD-10-PCS | Mod: S$PBB,,, | Performed by: CLINICAL NURSE SPECIALIST

## 2019-05-10 PROCEDURE — 25000003 PHARM REV CODE 250: Performed by: NURSE PRACTITIONER

## 2019-05-10 PROCEDURE — 86850 RBC ANTIBODY SCREEN: CPT

## 2019-05-10 PROCEDURE — 63600175 PHARM REV CODE 636 W HCPCS: Performed by: NURSE PRACTITIONER

## 2019-05-10 PROCEDURE — 85025 COMPLETE CBC W/AUTO DIFF WBC: CPT

## 2019-05-10 PROCEDURE — 25000003 PHARM REV CODE 250: Performed by: PHYSICIAN ASSISTANT

## 2019-05-10 PROCEDURE — 80069 RENAL FUNCTION PANEL: CPT

## 2019-05-10 PROCEDURE — 63600175 PHARM REV CODE 636 W HCPCS: Performed by: PHYSICIAN ASSISTANT

## 2019-05-10 PROCEDURE — 99233 SBSQ HOSP IP/OBS HIGH 50: CPT | Mod: GC,,, | Performed by: INTERNAL MEDICINE

## 2019-05-10 PROCEDURE — 99212 OFFICE O/P EST SF 10 MIN: CPT | Mod: S$PBB,,, | Performed by: CLINICAL NURSE SPECIALIST

## 2019-05-10 PROCEDURE — 99232 PR SUBSEQUENT HOSPITAL CARE,LEVL II: ICD-10-PCS | Mod: ,,, | Performed by: NURSE PRACTITIONER

## 2019-05-10 PROCEDURE — 83735 ASSAY OF MAGNESIUM: CPT

## 2019-05-10 RX ORDER — PANTOPRAZOLE SODIUM 40 MG/1
40 TABLET, DELAYED RELEASE ORAL DAILY
Qty: 30 TABLET | Refills: 11 | Status: SHIPPED | OUTPATIENT
Start: 2019-05-11 | End: 2019-10-28 | Stop reason: ALTCHOICE

## 2019-05-10 RX ORDER — LOPERAMIDE HYDROCHLORIDE 2 MG/1
2 CAPSULE ORAL 4 TIMES DAILY PRN
Qty: 40 CAPSULE | Refills: 0 | COMMUNITY
Start: 2019-05-10 | End: 2019-05-20

## 2019-05-10 RX ORDER — FAMOTIDINE 20 MG/1
20 TABLET, FILM COATED ORAL NIGHTLY
Qty: 30 TABLET | Refills: 5 | Status: SHIPPED | OUTPATIENT
Start: 2019-05-10 | End: 2019-06-10

## 2019-05-10 RX ORDER — OXYBUTYNIN CHLORIDE 10 MG/1
10 TABLET, EXTENDED RELEASE ORAL DAILY
Qty: 30 TABLET | Refills: 11 | Status: SHIPPED | OUTPATIENT
Start: 2019-05-10 | End: 2019-07-22 | Stop reason: SDUPTHER

## 2019-05-10 RX ORDER — INSULIN ASPART 100 [IU]/ML
6 INJECTION, SOLUTION INTRAVENOUS; SUBCUTANEOUS
Qty: 15 ML | Refills: 6 | Status: SHIPPED | OUTPATIENT
Start: 2019-05-10 | End: 2019-06-10 | Stop reason: SDUPTHER

## 2019-05-10 RX ADMIN — OXYBUTYNIN CHLORIDE 10 MG: 10 TABLET, EXTENDED RELEASE ORAL at 08:05

## 2019-05-10 RX ADMIN — ROSUVASTATIN CALCIUM 10 MG: 10 TABLET, FILM COATED ORAL at 08:05

## 2019-05-10 RX ADMIN — VILAZODONE HYDROCHLORIDE 40 MG: 10 TABLET ORAL at 08:05

## 2019-05-10 RX ADMIN — CALCIUM CARBONATE (ANTACID) CHEW TAB 500 MG 500 MG: 500 CHEW TAB at 10:05

## 2019-05-10 RX ADMIN — LEVOTHYROXINE SODIUM 75 MCG: 25 TABLET ORAL at 08:05

## 2019-05-10 RX ADMIN — PANTOPRAZOLE SODIUM 40 MG: 40 TABLET, DELAYED RELEASE ORAL at 08:05

## 2019-05-10 RX ADMIN — MAGNESIUM OXIDE TAB 400 MG (241.3 MG ELEMENTAL MG) 800 MG: 400 (241.3 MG) TAB at 08:05

## 2019-05-10 RX ADMIN — HEPARIN SODIUM 5000 UNITS: 5000 INJECTION, SOLUTION INTRAVENOUS; SUBCUTANEOUS at 05:05

## 2019-05-10 RX ADMIN — TACROLIMUS 1 MG: 1 CAPSULE ORAL at 08:05

## 2019-05-10 RX ADMIN — THERA TABS 1 TABLET: TAB at 08:05

## 2019-05-10 NOTE — SUBJECTIVE & OBJECTIVE
Interval History: Feels ok today. Still has loose stools and leukopenia. Fevers resolved.    Review of Systems   Constitutional: Positive for appetite change. Negative for activity change, chills, diaphoresis and fever.   HENT: Negative for congestion, sinus pressure, sinus pain and trouble swallowing.    Respiratory: Negative for cough, chest tightness and shortness of breath.    Cardiovascular: Negative for chest pain, palpitations and leg swelling.   Gastrointestinal: Positive for diarrhea. Negative for abdominal distention, abdominal pain, constipation, nausea and vomiting.   Genitourinary: Negative for difficulty urinating, dysuria, flank pain and urgency.   Musculoskeletal: Negative for neck pain and neck stiffness.   Skin: Negative for color change, pallor and rash.   Allergic/Immunologic: Positive for immunocompromised state.   Neurological: Negative for dizziness, tremors and light-headedness.   Psychiatric/Behavioral: Negative for behavioral problems, decreased concentration and sleep disturbance. The patient is not nervous/anxious.      Objective:     Vital Signs (Most Recent):  Temp: 98.9 °F (37.2 °C) (05/10/19 1141)  Pulse: 74 (05/10/19 1141)  Resp: 18 (05/10/19 1141)  BP: (!) 145/64 (05/10/19 1141)  SpO2: 99 % (05/10/19 1141) Vital Signs (24h Range):  Temp:  [98.1 °F (36.7 °C)-99.2 °F (37.3 °C)] 98.9 °F (37.2 °C)  Pulse:  [56-81] 74  Resp:  [18-20] 18  SpO2:  [93 %-99 %] 99 %  BP: (128-172)/(64-75) 145/64     Weight: 92 kg (202 lb 13.2 oz)  Body mass index is 31.77 kg/m².    Estimated Creatinine Clearance: 68.8 mL/min (based on SCr of 1 mg/dL).    Physical Exam   Constitutional: She is oriented to person, place, and time. She appears well-developed. No distress.   HENT:   Head: Normocephalic and atraumatic.   Eyes: Right eye exhibits no discharge. Left eye exhibits no discharge. No scleral icterus.   Neck: Normal range of motion.   Cardiovascular: Normal rate, regular rhythm, normal heart sounds and  intact distal pulses.   Pulmonary/Chest: Effort normal and breath sounds normal. She has no wheezes. She has no rales.   Abdominal: Soft. Bowel sounds are normal. She exhibits no distension. There is no tenderness.   Well healed kidney incision   Musculoskeletal: She exhibits no edema.   Neurological: She is alert and oriented to person, place, and time.   Skin: Skin is warm and dry. She is not diaphoretic. No erythema.   Psychiatric: She has a normal mood and affect. Her behavior is normal. Judgment and thought content normal.   Nursing note and vitals reviewed.      Significant Labs:   CBC:   Recent Labs   Lab 05/09/19  0632 05/10/19  0626   WBC 1.59* 1.78*   HGB 9.5* 8.4*   HCT 28.9* 26.0*    127*     CMP:   Recent Labs   Lab 05/09/19  0632 05/10/19  0626    137   K 4.4 4.2    107   CO2 26 22*   * 106   BUN 17 16   CREATININE 1.2 1.0   CALCIUM 9.2 8.5*   ALBUMIN 3.2* 3.0*   ANIONGAP 6* 8   EGFRNONAA 48.9* >60.0       Significant Imaging: I have reviewed all pertinent imaging results/findings within the past 24 hours.

## 2019-05-10 NOTE — PLAN OF CARE
Problem: Adult Inpatient Plan of Care  Goal: Plan of Care Review  Outcome: Ongoing (interventions implemented as appropriate)  Pt d/c off unit via wheelchair per order. Peripheral IV's removed - catheter intact. Pharmacy, ID, and endocrine met with pt - pt had no further questions. AVS given and explained to pt - pt verbalized understanding of when to call HCP, medications, and future appointments. Pt AAOx4, VSS, in no apparent distress.

## 2019-05-10 NOTE — PROGRESS NOTES
"[facility]  Endocrinology  Progress Note    Admit Date: 2019     Reason for Consult: Management of T2DM, Hyperglycemia      Surgical Procedure and Date:   OKT x1 on 2019     Diabetes diagnosis year:          Home Diabetes Medications:  Basaglar to 30 u qhs; Start Novolog 6 u AC + correction with meals     How often checking glucose at home? 1-3 x day   BG readings on regimen: low 200's  Hypoglycemia on the regimen?  No  Missed doses on regimen?  No     Diabetes Complications include:     Hyperglycemia and Diabetic peripheral neuropathy      Complicating diabetes co morbidities:   Thyroid disease and Obesity       HPI:   Patient is a 59 y.o. female with a diagnosis of chronic back pain, anemia, and DM/HTN nephropathy ESRD now s/p living kidney transplant 2019. Pt admitted as a transfer from [facility] where she presented early AM 5/6 due to n/v this AM and fevers x several days. Patient sees Mana Webber DNP, NP for chronic DM management. Endocrinology consulted to manage DM/hyperglycemia during admission to [facility].     Lab Results   Component Value Date    HGBA1C 8.9 (H) 2019    HGBA1C 8.9 (H) 2019                     Interval HPI:   Overnight events: NAEON. BG is within goal ranges on current SQ insulin regimen.   Eatin%  Nausea: No  Hypoglycemia and intervention: No  Fever: No  TPN and/or TF: No  If yes, type of TF/TPN and rate: None    BP (!) 142/63 (BP Location: Right arm, Patient Position: Sitting)   Pulse 68   Temp 98.3 °F (36.8 °C) (Oral)   Resp 12   Ht 5' 7" (1.702 m)   Wt 91 kg (200 lb 9.9 oz)   LMP 2012   SpO2 96%   Breastfeeding? No   BMI 31.42 kg/m²      Labs Reviewed and Include    Recent Labs   Lab 19  0632   *   CALCIUM 9.2   ALBUMIN 3.2*      K 4.4   CO2 26      BUN 17   CREATININE 1.2     Lab Results   Component Value Date    WBC 1.59 (LL) 2019    HGB 9.5 (L) 2019    HCT 28.9 (L) " 05/09/2019    MCV 90 05/09/2019     05/09/2019     No results for input(s): TSH, FREET4 in the last 168 hours.  Lab Results   Component Value Date    HGBA1C 8.9 (H) 05/07/2019    HGBA1C 8.9 (H) 05/07/2019       Nutritional status:   Body mass index is 31.42 kg/m².  Lab Results   Component Value Date    ALBUMIN 3.2 (L) 05/09/2019    ALBUMIN 3.0 (L) 05/08/2019    ALBUMIN 3.2 (L) 05/07/2019     No results found for: PREALBUMIN    Estimated Creatinine Clearance: 57 mL/min (based on SCr of 1.2 mg/dL).    Accu-Checks  Recent Labs     05/06/19  2122 05/06/19  2255 05/07/19  0812 05/07/19  1122 05/07/19  1652 05/07/19  2219 05/08/19  0850 05/08/19  1246 05/08/19  2037 05/09/19  0810   POCTGLUCOSE 175* 161* 141* 171* 157* 183* 154* 154* 138* 156*       Current Medications and/or Treatments Impacting Glycemic Control  Immunotherapy:    Immunosuppressants         Stop Route Frequency     tacrolimus capsule 1 mg      -- Oral 2 times daily        Steroids:   Hormones (From admission, onward)    None        Pressors:    Autonomic Drugs (From admission, onward)    None        Hyperglycemia/Diabetes Medications:   Antihyperglycemics (From admission, onward)    Start     Stop Route Frequency Ordered    05/07/19 2100  insulin detemir U-100 pen 24 Units      -- SubQ Nightly 05/07/19 1038    05/07/19 1145  insulin aspart U-100 pen 5 Units      -- SubQ 3 times daily with meals 05/07/19 1038    05/07/19 1138  insulin aspart U-100 pen 1-10 Units      -- SubQ Before meals & nightly PRN 05/07/19 1038          ASSESSMENT and PLAN    * Fever  Infection may elevate BG readings  Managed per primary team  Avoid hypoglycemia        Uncontrolled type 2 diabetes mellitus, with long-term current use of insulin  BG goal 140 - 180    Appetite remains variable.     Continue Levemir 24 units HS. 20% reduction from home dosing.     Continue Novolog 5 units TIDWM. 20% reduction from home dosing.     Moderate Dose SQ Insulin Correction Scale - given  insulin resistance     ** Please call Endocrine for any BG related issues **  ** Please notify Endocrine for any change and/or advance in diet**    Discharge Recommendations:   Resume home MDI regimen   Basaglar 30 units q hs, Novolog 6 units TID with meals, and the following Novolog correction scale   150 - 200 + 1 unit  201 - 250 + 2 units  251 - 300 + 3 units  301 - 350 + 4 units   > 350   + 5 units   Patient will need to continue to keep BG logs, and follow-up with Mana Webbre NP for insulin adjustments.       HLD (hyperlipidemia)  Managed per primary team  Condition may cause insulin resistance         Prophylactic immunotherapy  On immunosepresive therapy per transplant team; may elevate BG readings        Status post living-donor kidney transplantation  Managed per primary team  Avoid hypoglycemia      Long-term use of immunosuppressant medication  On immunosepresive therapy per transplant team; may elevate BG readings        Elida Burk NP  Endocrinology  [facility]

## 2019-05-10 NOTE — PLAN OF CARE
Problem: Adult Inpatient Plan of Care  Goal: Plan of Care Review  Outcome: Ongoing (interventions implemented as appropriate)  Pt has call bell in reach, non slip socks on, and bedrails up x2. Pt encouraged to wash hands. Pt has gotten meds for acid reflux.  Pt is having multiple bms. Pt has accuchecks ac/hs.

## 2019-05-10 NOTE — ASSESSMENT & PLAN NOTE
58yo woman w/a history of HTN, HLD, DM2, and subsequent ESRD (s/p LRDRT 1/14/2019, CMV D+/R-, campath induction, on maintenance tacro/MMF; c/b anemia with recently diagnosed parvovirus infection with positive PCR 4/2019 and recurrent ESBL Klebsiella UTI's s/p bland cystoureteroscopy on 4/11 by urogynecology) who was admitted on 5/7/2019 with FUO (prior admission for fever last month without source; 1-2 fevers/week), chills, anorexia, dry cough (seemingly due to reflux), nausea, loose stools, persistent anemia (HCT 21), and new overall myelosuppression (WBC 2.6, ) and elevated LFT's. Differential for complex presentation notable for known parvovirus infection and possible secondary infectious etiology (CMV would be highest on list given high risk serostatus, currently on unknown study drug, not viremic but possible tissue invasive disease without viremia). She is stable despite these findings with improvement in fevers following IVIG.     - patient has completed IVIG for parvovirus infection  - stool studies obtained given acute on chronic diarrhea were negative (including stool cx, C.diff, O&P, shiga toxin, giardia/crypto antigen, norovirus/rotavirus PCR, repeat CMV quant, and stool pathogens panel sent to [place])  - colonoscopy done 5/9 for biopsies which was grossly unremarkable excluding a polyp -- appreciate GI teams assistance, will f/u results outpatient (have requested CMV and EBV staining)  - have contacted Dr. Winslow to review CMV treatment via study -- on hold at present on discharge given leukopenia  - will request follow-up with Dr. Brown or Victoria as outpatient (known to both) for review of biopsy and follow-up after parvovirus IVIG treatment

## 2019-05-10 NOTE — PROGRESS NOTES
Discharge Medication Note:    Hospital Course: Admitted for anemia and infection work up. ID consulted. Parvovirus qualitative PCR on 4/15/16 positive. Per ID, treated for parvovirus with IVIG x 2. Completed on 5/7 and 5/8. Parvo PCR still in process from 5/7. Discussed with Dr. Mercado and decided it would be best to withdraw from CMV trial. Myfortic held in the setting of infection. She will need weekly CMV PCRs (last CMV PCR negative on 5/7) and q 2 weeks Parvovirus PCRs. Pentam given on 5/9, next dose due 6/9. WBC trended down during admission but Neupogen held, per heme no GCSF unless ANC <500. Additionally patient with complaints of reflux x 2 days, sent prescription for Protonix. Discussed with patient and she would like to continue Pepcid, but sent script for Protonix so if she continues with symptoms to switch Pepcid to Protonix.     Met with Andra Newell at discharge to review discharge medications and to update the blue medication card.       Andra Newell   Home Medication Instructions MARICARMEN:[id number]    Printed on:05/10/19 9848   Medication Information                      biotin 10,000 mcg Cap  Take 10,000 mcg by mouth once daily.              ergocalciferol (ERGOCALCIFEROL) 50,000 unit Cap  Take 1 capsule (50,000 Units total) by mouth every 7 days. Take on Tues             estradiol (ESTRACE) 0.01 % (0.1 mg/gram) vaginal cream  Place 0.5 g vaginally twice a week. Apply to the vagina daily for two weeks then twice a week.             famotidine (PEPCID) 20 MG tablet  Take 1 tablet (20 mg total) by mouth every evening.             insulin (BASAGLAR KWIKPEN U-100 INSULIN) glargine 100 units/mL (3mL) SubQ pen  Inject 30 Units into the skin every evening.             insulin aspart U-100 (NOVOLOG FLEXPEN U-100 INSULIN) 100 unit/mL (3 mL) InPn pen  Inject 6 Units into the skin 3 (three) times daily with meals. Plus sliding scale. Max TDD: 33 units             levothyroxine (SYNTHROID) 75  MCG tablet  TAKE ONE TABLET BY MOUTH ONCE DAILY             loperamide (IMODIUM) 2 mg capsule  Take 1 capsule (2 mg total) by mouth 4 (four) times daily as needed for Diarrhea.             LORazepam (ATIVAN) 2 MG Tab  Take 1 mg by mouth 2 (two) times daily as needed (anxiety). Take for 8 days.             magnesium oxide (MAG-OX) 400 mg (241.3 mg magnesium) tablet  Take 2 tablets (800 mg total) by mouth once daily.             multivitamin (THERAGRAN) tablet  Take 1 tablet by mouth once daily.             oxybutynin (DITROPAN-XL) 10 MG 24 hr tablet  Take 1 tablet (10 mg total) by mouth once daily.             pantoprazole (PROTONIX) 40 MG tablet  Take 1 tablet (40 mg total) by mouth once daily.             rosuvastatin (CRESTOR) 10 MG tablet  Take 1 tablet (10 mg total) by mouth once daily.             tacrolimus (PROGRAF) 0.5 MG Cap  Take 2 capsules (1 mg total) by mouth every 12 (twelve) hours.             VIIBRYD 40 mg Tab tablet  TAKE ONE TABLET BY MOUTH ONCE DAILY                 Pt was provided with prescriptions for the following meds:  E-rx: protonix, novolog     The following medications have been placed on HOLD and should be restarted in the outpatient setting (when appropriate): Pritesh Farias verbalized understanding and had the opportunity to ask questions.

## 2019-05-10 NOTE — PROGRESS NOTES
[facility]  Infectious Disease  Progress Note    Patient Name: Andra Newell  MRN: [id number]  Admission Date: 5/7/2019  Length of Stay: 3 days  Attending Physician: Beto Slade Jr., MD  Primary Care Provider: Gita Cohen MD    Isolation Status: No active isolations  Assessment/Plan:      * Fever  60yo woman w/a history of HTN, HLD, DM2, and subsequent ESRD (s/p LRDRT 1/14/2019, CMV D+/R-, campath induction, on maintenance tacro/MMF; c/b anemia with recently diagnosed parvovirus infection with positive PCR 4/2019 and recurrent ESBL Klebsiella UTI's s/p bland cystoureteroscopy on 4/11 by urogynecology) who was admitted on 5/7/2019 with FUO (prior admission for fever last month without source; 1-2 fevers/week), chills, anorexia, dry cough (seemingly due to reflux), nausea, loose stools, persistent anemia (HCT 21), and new overall myelosuppression (WBC 2.6, ) and elevated LFT's. Differential for complex presentation notable for known parvovirus infection and possible secondary infectious etiology (CMV would be highest on list given high risk serostatus, currently on unknown study drug, not viremic but possible tissue invasive disease without viremia). She is stable despite these findings with improvement in fevers following IVIG.     - patient has completed IVIG for parvovirus infection  - stool studies obtained given acute on chronic diarrhea were negative (including stool cx, C.diff, O&P, shiga toxin, giardia/crypto antigen, norovirus/rotavirus PCR, repeat CMV quant, and stool pathogens panel sent to [place])  - colonoscopy done 5/9 for biopsies which was grossly unremarkable excluding a polyp -- appreciate GI teams assistance, will f/u results outpatient (have requested CMV and EBV staining)  - have contacted Dr. Winslow to review CMV treatment via study -- on hold at present on discharge given leukopenia  - will request follow-up with Dr. Brown or Victoria as outpatient (known to  both) for review of biopsy and follow-up after parvovirus IVIG treatment        Anticipated Disposition: pending improvement    Thank you for your consult. I will follow-up with patient. Please contact us if you have any additional questions.     Huong Chavez MD  Transplant ID Attending  [phone]    Huong Chavez MD  Infectious Disease  [facility]    Subjective:     Principal Problem:Fever    HPI:     Interval History: Feels ok today. Still has loose stools and leukopenia. Fevers resolved.    Review of Systems   Constitutional: Positive for appetite change. Negative for activity change, chills, diaphoresis and fever.   HENT: Negative for congestion, sinus pressure, sinus pain and trouble swallowing.    Respiratory: Negative for cough, chest tightness and shortness of breath.    Cardiovascular: Negative for chest pain, palpitations and leg swelling.   Gastrointestinal: Positive for diarrhea. Negative for abdominal distention, abdominal pain, constipation, nausea and vomiting.   Genitourinary: Negative for difficulty urinating, dysuria, flank pain and urgency.   Musculoskeletal: Negative for neck pain and neck stiffness.   Skin: Negative for color change, pallor and rash.   Allergic/Immunologic: Positive for immunocompromised state.   Neurological: Negative for dizziness, tremors and light-headedness.   Psychiatric/Behavioral: Negative for behavioral problems, decreased concentration and sleep disturbance. The patient is not nervous/anxious.      Objective:     Vital Signs (Most Recent):  Temp: 98.9 °F (37.2 °C) (05/10/19 1141)  Pulse: 74 (05/10/19 1141)  Resp: 18 (05/10/19 1141)  BP: (!) 145/64 (05/10/19 1141)  SpO2: 99 % (05/10/19 1141) Vital Signs (24h Range):  Temp:  [98.1 °F (36.7 °C)-99.2 °F (37.3 °C)] 98.9 °F (37.2 °C)  Pulse:  [56-81] 74  Resp:  [18-20] 18  SpO2:  [93 %-99 %] 99 %  BP: (128-172)/(64-75) 145/64     Weight: 92 kg (202 lb 13.2 oz)  Body mass index is 31.77  kg/m².    Estimated Creatinine Clearance: 68.8 mL/min (based on SCr of 1 mg/dL).    Physical Exam   Constitutional: She is oriented to person, place, and time. She appears well-developed. No distress.   HENT:   Head: Normocephalic and atraumatic.   Eyes: Right eye exhibits no discharge. Left eye exhibits no discharge. No scleral icterus.   Neck: Normal range of motion.   Cardiovascular: Normal rate, regular rhythm, normal heart sounds and intact distal pulses.   Pulmonary/Chest: Effort normal and breath sounds normal. She has no wheezes. She has no rales.   Abdominal: Soft. Bowel sounds are normal. She exhibits no distension. There is no tenderness.   Well healed kidney incision   Musculoskeletal: She exhibits no edema.   Neurological: She is alert and oriented to person, place, and time.   Skin: Skin is warm and dry. She is not diaphoretic. No erythema.   Psychiatric: She has a normal mood and affect. Her behavior is normal. Judgment and thought content normal.   Nursing note and vitals reviewed.      Significant Labs:   CBC:   Recent Labs   Lab 05/09/19  0632 05/10/19  0626   WBC 1.59* 1.78*   HGB 9.5* 8.4*   HCT 28.9* 26.0*    127*     CMP:   Recent Labs   Lab 05/09/19  0632 05/10/19  0626    137   K 4.4 4.2    107   CO2 26 22*   * 106   BUN 17 16   CREATININE 1.2 1.0   CALCIUM 9.2 8.5*   ALBUMIN 3.2* 3.0*   ANIONGAP 6* 8   EGFRNONAA 48.9* >60.0       Significant Imaging: I have reviewed all pertinent imaging results/findings within the past 24 hours.

## 2019-05-11 LAB
BACTERIA BLD CULT: NORMAL
BACTERIA BLD CULT: NORMAL

## 2019-05-13 ENCOUNTER — PATIENT OUTREACH (OUTPATIENT)
Dept: ADMINISTRATIVE | Facility: CLINIC | Age: 59
End: 2019-05-13

## 2019-05-13 ENCOUNTER — LAB VISIT (OUTPATIENT)
Dept: LAB | Facility: HOSPITAL | Age: 59
End: 2019-05-13
Attending: INTERNAL MEDICINE
Payer: MEDICARE

## 2019-05-13 DIAGNOSIS — Z94.0 KIDNEY REPLACED BY TRANSPLANT: ICD-10-CM

## 2019-05-13 LAB
ALBUMIN SERPL BCP-MCNC: 3.4 G/DL (ref 3.5–5.2)
ANION GAP SERPL CALC-SCNC: 8 MMOL/L (ref 8–16)
ANISOCYTOSIS BLD QL SMEAR: SLIGHT
B19V DNA # SPEC NAA+PROBE: ABNORMAL COPIES/ML
BASOPHILS # BLD AUTO: 0.02 K/UL (ref 0–0.2)
BASOPHILS NFR BLD: 0.7 % (ref 0–1.9)
BUN SERPL-MCNC: 22 MG/DL (ref 8–23)
CALCIUM SERPL-MCNC: 9.4 MG/DL (ref 8.7–10.5)
CHLORIDE SERPL-SCNC: 109 MMOL/L (ref 95–110)
CO2 SERPL-SCNC: 23 MMOL/L (ref 23–29)
CREAT SERPL-MCNC: 1.2 MG/DL (ref 0.5–1.4)
DIFFERENTIAL METHOD: ABNORMAL
EOSINOPHIL # BLD AUTO: 0.1 K/UL (ref 0–0.5)
EOSINOPHIL NFR BLD: 3.6 % (ref 0–8)
ERYTHROCYTE [DISTWIDTH] IN BLOOD BY AUTOMATED COUNT: 15.7 % (ref 11.5–14.5)
EST. GFR  (AFRICAN AMERICAN): 56.4 ML/MIN/1.73 M^2
EST. GFR  (NON AFRICAN AMERICAN): 48.9 ML/MIN/1.73 M^2
GLUCOSE SERPL-MCNC: 177 MG/DL (ref 70–110)
HCT VFR BLD AUTO: 28.3 % (ref 37–48.5)
HGB BLD-MCNC: 9.2 G/DL (ref 12–16)
HYPOCHROMIA BLD QL SMEAR: ABNORMAL
IMM GRANULOCYTES # BLD AUTO: 0.15 K/UL (ref 0–0.04)
IMM GRANULOCYTES NFR BLD AUTO: 4.9 % (ref 0–0.5)
IMMUNKNOW (STIMULATED): 7 NG/ML (ref 226–524)
LYMPHOCYTES # BLD AUTO: 0.5 K/UL (ref 1–4.8)
LYMPHOCYTES NFR BLD: 16.3 % (ref 18–48)
MAGNESIUM SERPL-MCNC: 1.5 MG/DL (ref 1.6–2.6)
MCH RBC QN AUTO: 29.8 PG (ref 27–31)
MCHC RBC AUTO-ENTMCNC: 32.5 G/DL (ref 32–36)
MCV RBC AUTO: 92 FL (ref 82–98)
MONOCYTES # BLD AUTO: 0.5 K/UL (ref 0.3–1)
MONOCYTES NFR BLD: 15 % (ref 4–15)
NEUTROPHILS # BLD AUTO: 1.8 K/UL (ref 1.8–7.7)
NEUTROPHILS NFR BLD: 59.5 % (ref 38–73)
NRBC BLD-RTO: 1 /100 WBC
OVALOCYTES BLD QL SMEAR: ABNORMAL
PHOSPHATE SERPL-MCNC: 3.6 MG/DL (ref 2.7–4.5)
PLATELET # BLD AUTO: 188 K/UL (ref 150–350)
PLATELET BLD QL SMEAR: ABNORMAL
PMV BLD AUTO: 10.5 FL (ref 9.2–12.9)
POIKILOCYTOSIS BLD QL SMEAR: SLIGHT
POLYCHROMASIA BLD QL SMEAR: ABNORMAL
POTASSIUM SERPL-SCNC: 4.3 MMOL/L (ref 3.5–5.1)
RBC # BLD AUTO: 3.09 M/UL (ref 4–5.4)
SODIUM SERPL-SCNC: 140 MMOL/L (ref 136–145)
WBC # BLD AUTO: 3.06 K/UL (ref 3.9–12.7)

## 2019-05-13 PROCEDURE — 83735 ASSAY OF MAGNESIUM: CPT

## 2019-05-13 PROCEDURE — 87799 DETECT AGENT NOS DNA QUANT: CPT

## 2019-05-13 PROCEDURE — 80197 ASSAY OF TACROLIMUS: CPT

## 2019-05-13 PROCEDURE — 85025 COMPLETE CBC W/AUTO DIFF WBC: CPT

## 2019-05-13 PROCEDURE — 80069 RENAL FUNCTION PANEL: CPT

## 2019-05-13 NOTE — PROGRESS NOTES
Subjective:      Patient ID: Andra Newell is a 59 y.o. female.    Chief Complaint:Research      History of Present Illness  Protocol: [id number]  IRB# [id number]  PI: Nicholas GUAN)  Site: [id number]  Subject #[id number]     Date of Visit: 10-MAY-2019    Ms. Andra Newell is a 59 y.o. female with a past medical history of diabetes type 2, HTN, hyperlipidemia, arthritis, GERD, and ESRD who is s/p a living-donor kidney transplant (D+/R-) on 1/14/19. Ms. Newell was seen for research follow up visit in hospital. She was admitted 5/7/19 for fever. Infection work up negative. Labs significant for anemia and WBC trending down. Patient found to have positive Parvovirus qualitative PCR on 4/15/19. Patient given IVIG x2 doses while admitted.     Discussed patient with Dr. Mercado and due to decreasing WBC transplant is stopping all medications that can be contributing. Per study protocol once study medication stopped for 7 days is not to be restarted. Patient will remain on study for follow up only, and will given no further study medication. Patient informed and agreeable to continue follow up with ID research team.     Review of Systems   Constitution: Positive for malaise/fatigue. Negative for chills and fever.   HENT: Negative for congestion and hoarse voice.    Eyes: Negative for blurred vision.   Cardiovascular: Negative for chest pain and leg swelling.   Respiratory: Negative for cough and shortness of breath.    Skin: Negative for rash.   Musculoskeletal: Negative for joint swelling.   Gastrointestinal: Positive for diarrhea and heartburn. Negative for nausea and vomiting.   Genitourinary: Negative for hematuria.   Neurological: Negative for dizziness.   Psychiatric/Behavioral: Negative for altered mental status.     Objective:   Physical Exam   Constitutional: She is oriented to person, place, and time. She appears well-developed and well-nourished.   HENT:   Head: Normocephalic and atraumatic.   Right  Ear: External ear normal.   Left Ear: External ear normal.   Nose: Nose normal.   Eyes: Conjunctivae and EOM are normal. Right eye exhibits no discharge. Left eye exhibits no discharge.   Neck: Normal range of motion.   Cardiovascular: Normal rate and normal heart sounds.   Pulmonary/Chest: Effort normal and breath sounds normal. No respiratory distress. She has no wheezes. She has no rales.   Abdominal: Soft. Bowel sounds are normal. She exhibits no distension. There is no tenderness.   Musculoskeletal: Normal range of motion. She exhibits no edema.   Neurological: She is alert and oriented to person, place, and time.   Skin: Skin is warm and dry.   Psychiatric: She has a normal mood and affect. Her behavior is normal. Judgment and thought content normal.   Vitals reviewed.    Assessment:       1. Kidney replaced by transplant    2. Research study patient          Plan:       1. D/C study medication  2. Research visit conducted per Byron SARAVIA  3. Exam per research protocol   3. Follow up per study protocol

## 2019-05-13 NOTE — PATIENT INSTRUCTIONS
Febrile Illness, Uncertain Cause (Adult)  You have a fever, but the cause is not certain. A fever is a natural reaction of the body to an illness such as infection due to a virus or bacteria. In most cases, the temperature itself is not harmful. It actually helps the body fight infections. A fever does not need to be treated unless you feel very uncomfortable.  Sometimes a fever can be an early sign of a more serious infection, so make sure to follow up if your condition worsens.  Home care  Unless given other instructions by your healthcare provider, follow these guidelines when caring for yourself at home.  General care  · If your symptoms are severe, rest at home for the first 2 to 3 days. When you resume activity, don't let yourself get too tired.  · Do not smoke. Also avoid being exposed to secondhand smoke.  · Your appetite may be poor, so a light diet is fine. Avoid dehydration by drinking 6 to 8 glasses of fluids per day (such as water, soft drinks, sports drinks, juices, tea, or soup). Extra fluids will help loosen secretions in the nose and lungs.  Medicines  · You can take acetaminophen or ibuprofen for pain, unless you were given a different fever-reducing/pain medicine to use. (Note: If you have chronic liver or kidney disease or have ever had a stomach ulcer or gastrointestinal bleeding, talk with your healthcare provider before using these medicines. Also talk to your provider if you are taking medicine to prevent blood clots.) Aspirin should never be given to anyone younger than 18 years of age who is ill with a viral infection or fever. It may cause severe liver or brain damage.  · If you were given antibiotics, take them until they are used up, or your healthcare provider tells you to stop. It is important to finish the antibiotics even though you feel better. This is to make sure the infection has cleared. Be aware that antibiotics are not usually given for a fever with an unknown  cause.  · Over-the-counter medicines will not shorten the duration of the illness. However, they may be helpful for the following symptoms: cough, sore throat, or nasal and sinus congestion. Ask your pharmacist for product suggestions. (Note: Do not use decongestants if you have high blood pressure.)  Follow-up care  Follow up with your healthcare provider, or as advised.  · If a culture was done, you will be notified if your treatment needs to be changed. You can call as directed for the results.  · If X-rays, a CT, or an ultrasound were done, a specialist will review them. You will be notified of any findings that may affect your care.  Call 911  Contact emergency services right away if any of these occur:  · Trouble breathing or swallowing, or wheezing  · Chest pain  · Confusion  · Extreme drowsiness or trouble awakening  · Fainting or loss of consciousness  · Rapid heart rate  · Low blood pressure  · Vomiting blood, or large amounts of blood in stool  · Seizure  When to seek medical advice  Call your healthcare provider right away if any of these occur:  · Cough with lots of colored sputum (mucus) or blood in your sputum  · Severe headache  · Face, neck, throat, or ear pain  · Feeling drowsy  · Abdominal pain  · Repeated vomiting or diarrhea  · Joint pain or a new rash  · Burning when urinating  · Fever of 100.4°F (38°C) or higher, that does not get better after taking fever-reducing medicine  · Feeling weak or dizzy  Date Last Reviewed: 7/30/2015  © 1614-9847 [employer]. [address] [place] [address], [place] [address]. All rights reserved. This information is not intended as a substitute for professional medical care. Always follow your healthcare professional's instructions.

## 2019-05-14 DIAGNOSIS — Z20.828 PARVOVIRUS EXPOSURE: Primary | ICD-10-CM

## 2019-05-14 LAB — TACROLIMUS BLD-MCNC: 5.1 NG/ML (ref 5–15)

## 2019-05-15 LAB — CMV DNA SERPL NAA+PROBE-ACNC: NORMAL IU/ML

## 2019-05-15 NOTE — PROGRESS NOTES
Protocol: [id number]  IRB# 2017.512  PI: Nicholas GUAN)  Site: [id number]  Subject #[id number]     VISIT: EOT      Date of Visit: 10/MAY/2019     Patient was seen in Hospital for an EOT visit. Protocol Labs were drawn at 12:30PM. All specimens processed and shipped per protocol. Byron SARAVIA conducted visti in its entirety with Sub-I, Sharri Sands present. Patient agrees to continue his participation in the study and all questions answered. Sharri Sands, Sub-I performed a physical exam and assessed recent JOHN hospitalization.    Due to low decreased white count, the decision was made to stop all medicaiton, study or SOC, that could pre-dispose subject to have a decreased white count. Decision made made in agreement with both PI and transplant team as the treatment plan to follow.    Vital signs obtained sitting:  BP: 145/64  HR: 76  RR: 18  Wt: 95.3 kg  T: 98.7 F oral     CMV Disease Assessment, Health Outcomes Assessment, Renal Transplant Outcomes, Health Outcomes Assessment and Child Pierre Score completed. Subject agreed to continue to follow compliance in regards to effective birth control as required by protocol.       Diary collected and reviewed with subject. Pt stated that he took medications every day and did not miss any days. Pt stated no loss of drug. Current diary collected. New diary dispensed.      Pill bottles were brought to visit and collected. Drug returned (some missing will return later):   [id number]/Placebo -     1  VGCV/Placebo -         2  ACV/Placebo -            3      Study participant reminded to call  for any questions. Next visit in 1 month.

## 2019-05-16 ENCOUNTER — TELEPHONE (OUTPATIENT)
Dept: TRANSPLANT | Facility: CLINIC | Age: 59
End: 2019-05-16

## 2019-05-16 LAB
BKV DNA SERPL NAA+PROBE-ACNC: <125 COPIES/ML
BKV DNA SERPL NAA+PROBE-LOG#: <2.1 LOG (10) COPIES/ML
BKV DNA SERPL QL NAA+PROBE: NOT DETECTED

## 2019-05-16 NOTE — TELEPHONE ENCOUNTER
----- Message from Sneha Meléndez sent at 5/16/2019  1:07 PM CDT -----  Contact: Any   Contact: Patient friend-Any     Message: Any have a question about the lab appointment for Monday and also wanted to know if the patient can go out of town this weekend.     Communication Preference: [phone]    Additional Information:     Thanks!

## 2019-05-20 ENCOUNTER — LAB VISIT (OUTPATIENT)
Dept: LAB | Facility: HOSPITAL | Age: 59
End: 2019-05-20
Attending: INTERNAL MEDICINE
Payer: MEDICARE

## 2019-05-20 DIAGNOSIS — Z20.828 PARVOVIRUS EXPOSURE: ICD-10-CM

## 2019-05-20 DIAGNOSIS — Z94.0 KIDNEY REPLACED BY TRANSPLANT: ICD-10-CM

## 2019-05-20 LAB
ALBUMIN SERPL BCP-MCNC: 3.6 G/DL (ref 3.5–5.2)
ANION GAP SERPL CALC-SCNC: 11 MMOL/L (ref 8–16)
BASOPHILS # BLD AUTO: 0.03 K/UL (ref 0–0.2)
BASOPHILS NFR BLD: 0.9 % (ref 0–1.9)
BUN SERPL-MCNC: 16 MG/DL (ref 8–23)
CALCIUM SERPL-MCNC: 10.2 MG/DL (ref 8.7–10.5)
CHLORIDE SERPL-SCNC: 105 MMOL/L (ref 95–110)
CO2 SERPL-SCNC: 24 MMOL/L (ref 23–29)
CREAT SERPL-MCNC: 1.2 MG/DL (ref 0.5–1.4)
DIFFERENTIAL METHOD: ABNORMAL
EOSINOPHIL # BLD AUTO: 0.1 K/UL (ref 0–0.5)
EOSINOPHIL NFR BLD: 4.1 % (ref 0–8)
ERYTHROCYTE [DISTWIDTH] IN BLOOD BY AUTOMATED COUNT: 17.4 % (ref 11.5–14.5)
EST. GFR  (AFRICAN AMERICAN): 56.4 ML/MIN/1.73 M^2
EST. GFR  (NON AFRICAN AMERICAN): 48.9 ML/MIN/1.73 M^2
GLUCOSE SERPL-MCNC: 202 MG/DL (ref 70–110)
HCT VFR BLD AUTO: 33.1 % (ref 37–48.5)
HGB BLD-MCNC: 10.5 G/DL (ref 12–16)
IMM GRANULOCYTES # BLD AUTO: 0.02 K/UL (ref 0–0.04)
IMM GRANULOCYTES NFR BLD AUTO: 0.6 % (ref 0–0.5)
LYMPHOCYTES # BLD AUTO: 0.4 K/UL (ref 1–4.8)
LYMPHOCYTES NFR BLD: 11.5 % (ref 18–48)
MAGNESIUM SERPL-MCNC: 1.6 MG/DL (ref 1.6–2.6)
MCH RBC QN AUTO: 30.7 PG (ref 27–31)
MCHC RBC AUTO-ENTMCNC: 31.7 G/DL (ref 32–36)
MCV RBC AUTO: 97 FL (ref 82–98)
MONOCYTES # BLD AUTO: 0.5 K/UL (ref 0.3–1)
MONOCYTES NFR BLD: 13.5 % (ref 4–15)
NEUTROPHILS # BLD AUTO: 2.4 K/UL (ref 1.8–7.7)
NEUTROPHILS NFR BLD: 69.4 % (ref 38–73)
NRBC BLD-RTO: 0 /100 WBC
PHOSPHATE SERPL-MCNC: 3.8 MG/DL (ref 2.7–4.5)
PLATELET # BLD AUTO: 230 K/UL (ref 150–350)
PMV BLD AUTO: 10.1 FL (ref 9.2–12.9)
POTASSIUM SERPL-SCNC: 4.6 MMOL/L (ref 3.5–5.1)
RBC # BLD AUTO: 3.42 M/UL (ref 4–5.4)
SODIUM SERPL-SCNC: 140 MMOL/L (ref 136–145)
WBC # BLD AUTO: 3.4 K/UL (ref 3.9–12.7)

## 2019-05-20 PROCEDURE — 85025 COMPLETE CBC W/AUTO DIFF WBC: CPT

## 2019-05-20 PROCEDURE — 83735 ASSAY OF MAGNESIUM: CPT

## 2019-05-20 PROCEDURE — 80197 ASSAY OF TACROLIMUS: CPT

## 2019-05-20 PROCEDURE — 80069 RENAL FUNCTION PANEL: CPT

## 2019-05-20 PROCEDURE — 87799 DETECT AGENT NOS DNA QUANT: CPT

## 2019-05-20 PROCEDURE — 36415 COLL VENOUS BLD VENIPUNCTURE: CPT | Mod: PO

## 2019-05-21 DIAGNOSIS — Z94.0 KIDNEY REPLACED BY TRANSPLANT: ICD-10-CM

## 2019-05-21 LAB — TACROLIMUS BLD-MCNC: 4.3 NG/ML (ref 5–15)

## 2019-05-21 RX ORDER — TACROLIMUS 0.5 MG/1
CAPSULE ORAL
Qty: 150 CAPSULE | Refills: 11 | Status: SHIPPED | OUTPATIENT
Start: 2019-05-21 | End: 2019-05-24 | Stop reason: SDUPTHER

## 2019-05-21 NOTE — TELEPHONE ENCOUNTER
Patient repeated back and voice a understanding of orders and is presently taking 1mg PO q 12 hours.  Patient will increase Tacro to 1.5mg PO in AM and 1mg PO in PM.  Next labs 5/23/19.

## 2019-05-21 NOTE — TELEPHONE ENCOUNTER
----- Message from July Vasquez MD sent at 5/21/2019  3:56 PM CDT -----  Prograf 3/2 mg BID and check the level on Thursday

## 2019-05-23 ENCOUNTER — LAB VISIT (OUTPATIENT)
Dept: LAB | Facility: HOSPITAL | Age: 59
End: 2019-05-23
Attending: INTERNAL MEDICINE
Payer: MEDICARE

## 2019-05-23 ENCOUNTER — TELEPHONE (OUTPATIENT)
Dept: GASTROENTEROLOGY | Facility: CLINIC | Age: 59
End: 2019-05-23

## 2019-05-23 DIAGNOSIS — Z94.0 KIDNEY REPLACED BY TRANSPLANT: ICD-10-CM

## 2019-05-23 LAB
ALBUMIN SERPL BCP-MCNC: 3.7 G/DL (ref 3.5–5.2)
ANION GAP SERPL CALC-SCNC: 11 MMOL/L (ref 8–16)
BASOPHILS # BLD AUTO: 0.03 K/UL (ref 0–0.2)
BASOPHILS NFR BLD: 0.7 % (ref 0–1.9)
BUN SERPL-MCNC: 19 MG/DL (ref 8–23)
CALCIUM SERPL-MCNC: 10.2 MG/DL (ref 8.7–10.5)
CHLORIDE SERPL-SCNC: 103 MMOL/L (ref 95–110)
CMV DNA SERPL NAA+PROBE-ACNC: NORMAL IU/ML
CO2 SERPL-SCNC: 26 MMOL/L (ref 23–29)
CREAT SERPL-MCNC: 1.2 MG/DL (ref 0.5–1.4)
DIFFERENTIAL METHOD: ABNORMAL
EOSINOPHIL # BLD AUTO: 0.1 K/UL (ref 0–0.5)
EOSINOPHIL NFR BLD: 2.9 % (ref 0–8)
ERYTHROCYTE [DISTWIDTH] IN BLOOD BY AUTOMATED COUNT: 18.2 % (ref 11.5–14.5)
EST. GFR  (AFRICAN AMERICAN): 56.4 ML/MIN/1.73 M^2
EST. GFR  (NON AFRICAN AMERICAN): 48.9 ML/MIN/1.73 M^2
GLUCOSE SERPL-MCNC: 210 MG/DL (ref 70–110)
HCT VFR BLD AUTO: 34.4 % (ref 37–48.5)
HGB BLD-MCNC: 10.7 G/DL (ref 12–16)
IMM GRANULOCYTES # BLD AUTO: 0.03 K/UL (ref 0–0.04)
IMM GRANULOCYTES NFR BLD AUTO: 0.7 % (ref 0–0.5)
LYMPHOCYTES # BLD AUTO: 0.5 K/UL (ref 1–4.8)
LYMPHOCYTES NFR BLD: 11.3 % (ref 18–48)
MAGNESIUM SERPL-MCNC: 1.6 MG/DL (ref 1.6–2.6)
MCH RBC QN AUTO: 30.5 PG (ref 27–31)
MCHC RBC AUTO-ENTMCNC: 31.1 G/DL (ref 32–36)
MCV RBC AUTO: 98 FL (ref 82–98)
MONOCYTES # BLD AUTO: 0.5 K/UL (ref 0.3–1)
MONOCYTES NFR BLD: 12.3 % (ref 4–15)
NEUTROPHILS # BLD AUTO: 2.9 K/UL (ref 1.8–7.7)
NEUTROPHILS NFR BLD: 72.1 % (ref 38–73)
NRBC BLD-RTO: 0 /100 WBC
PHOSPHATE SERPL-MCNC: 4.2 MG/DL (ref 2.7–4.5)
PLATELET # BLD AUTO: 231 K/UL (ref 150–350)
PMV BLD AUTO: 10.4 FL (ref 9.2–12.9)
POTASSIUM SERPL-SCNC: 5 MMOL/L (ref 3.5–5.1)
RBC # BLD AUTO: 3.51 M/UL (ref 4–5.4)
SODIUM SERPL-SCNC: 140 MMOL/L (ref 136–145)
WBC # BLD AUTO: 4.07 K/UL (ref 3.9–12.7)

## 2019-05-23 PROCEDURE — 36415 COLL VENOUS BLD VENIPUNCTURE: CPT | Mod: PO

## 2019-05-23 PROCEDURE — 83735 ASSAY OF MAGNESIUM: CPT

## 2019-05-23 PROCEDURE — 80197 ASSAY OF TACROLIMUS: CPT

## 2019-05-23 PROCEDURE — 80069 RENAL FUNCTION PANEL: CPT

## 2019-05-23 PROCEDURE — 85025 COMPLETE CBC W/AUTO DIFF WBC: CPT

## 2019-05-23 NOTE — TELEPHONE ENCOUNTER
----- Message from Fady Ewing MD sent at 5/23/2019 10:36 AM CDT -----  Please call, everything was ok on her biopsies. The polyp was benign. There were no other findings and no infections

## 2019-05-24 DIAGNOSIS — Z94.0 KIDNEY REPLACED BY TRANSPLANT: ICD-10-CM

## 2019-05-24 LAB — TACROLIMUS BLD-MCNC: 4.8 NG/ML (ref 5–15)

## 2019-05-24 RX ORDER — TACROLIMUS 0.5 MG/1
CAPSULE ORAL
Qty: 210 CAPSULE | Refills: 11 | Status: SHIPPED | OUTPATIENT
Start: 2019-05-24 | End: 2019-06-24 | Stop reason: SDUPTHER

## 2019-05-24 RX ORDER — PENTAMIDINE ISETHIONATE 300 MG/300MG
300 INHALANT RESPIRATORY (INHALATION)
COMMUNITY
End: 2019-06-10

## 2019-05-24 NOTE — TELEPHONE ENCOUNTER
Patient repeated back and voice a understanding of orders.Next labs 5/27/19 and will be seen in clinic on 5/27.

## 2019-05-24 NOTE — TELEPHONE ENCOUNTER
----- Message from Anahy Weiss MD sent at 5/24/2019  1:29 PM CDT -----  Prograf lower than goal --> increase Prograf from 1.5/1 to 2/1.5. Repeat labs on Monday

## 2019-05-27 ENCOUNTER — OFFICE VISIT (OUTPATIENT)
Dept: TRANSPLANT | Facility: CLINIC | Age: 59
End: 2019-05-27
Payer: MEDICARE

## 2019-05-27 ENCOUNTER — LAB VISIT (OUTPATIENT)
Dept: LAB | Facility: HOSPITAL | Age: 59
End: 2019-05-27
Attending: INTERNAL MEDICINE
Payer: MEDICARE

## 2019-05-27 VITALS
TEMPERATURE: 99 F | HEIGHT: 67 IN | HEART RATE: 95 BPM | SYSTOLIC BLOOD PRESSURE: 123 MMHG | WEIGHT: 203.5 LBS | OXYGEN SATURATION: 98 % | BODY MASS INDEX: 31.94 KG/M2 | DIASTOLIC BLOOD PRESSURE: 64 MMHG | RESPIRATION RATE: 17 BRPM

## 2019-05-27 DIAGNOSIS — Z29.89 PROPHYLACTIC IMMUNOTHERAPY: ICD-10-CM

## 2019-05-27 DIAGNOSIS — D63.8 ANEMIA OF CHRONIC DISEASE: ICD-10-CM

## 2019-05-27 DIAGNOSIS — Z79.60 LONG-TERM USE OF IMMUNOSUPPRESSANT MEDICATION: ICD-10-CM

## 2019-05-27 DIAGNOSIS — Z94.0 KIDNEY REPLACED BY TRANSPLANT: ICD-10-CM

## 2019-05-27 DIAGNOSIS — Z91.89 AT RISK FOR OPPORTUNISTIC INFECTIONS: ICD-10-CM

## 2019-05-27 DIAGNOSIS — Z94.0 STATUS POST LIVING-DONOR KIDNEY TRANSPLANTATION: ICD-10-CM

## 2019-05-27 DIAGNOSIS — B34.3 PARVOVIRUS B19 INFECTION: ICD-10-CM

## 2019-05-27 DIAGNOSIS — N18.30 CHRONIC KIDNEY DISEASE (CKD), STAGE III (MODERATE): Primary | Chronic | ICD-10-CM

## 2019-05-27 DIAGNOSIS — D61.9 APLASTIC ANEMIA: ICD-10-CM

## 2019-05-27 DIAGNOSIS — E83.42 HYPOMAGNESEMIA: ICD-10-CM

## 2019-05-27 LAB
ALBUMIN SERPL BCP-MCNC: 3.5 G/DL (ref 3.5–5.2)
ANION GAP SERPL CALC-SCNC: 10 MMOL/L (ref 8–16)
B19V DNA # SPEC NAA+PROBE: ABNORMAL COPIES/ML
BASOPHILS # BLD AUTO: 0.03 K/UL (ref 0–0.2)
BASOPHILS NFR BLD: 1 % (ref 0–1.9)
BUN SERPL-MCNC: 20 MG/DL (ref 8–23)
CALCIUM SERPL-MCNC: 9.4 MG/DL (ref 8.7–10.5)
CHLORIDE SERPL-SCNC: 107 MMOL/L (ref 95–110)
CO2 SERPL-SCNC: 26 MMOL/L (ref 23–29)
CREAT SERPL-MCNC: 1.1 MG/DL (ref 0.5–1.4)
DIFFERENTIAL METHOD: ABNORMAL
EOSINOPHIL # BLD AUTO: 0.1 K/UL (ref 0–0.5)
EOSINOPHIL NFR BLD: 3.2 % (ref 0–8)
ERYTHROCYTE [DISTWIDTH] IN BLOOD BY AUTOMATED COUNT: 18.1 % (ref 11.5–14.5)
EST. GFR  (AFRICAN AMERICAN): >60 ML/MIN/1.73 M^2
EST. GFR  (NON AFRICAN AMERICAN): 54.3 ML/MIN/1.73 M^2
GLUCOSE SERPL-MCNC: 200 MG/DL (ref 70–110)
HCT VFR BLD AUTO: 33.5 % (ref 37–48.5)
HGB BLD-MCNC: 10.6 G/DL (ref 12–16)
IMM GRANULOCYTES # BLD AUTO: 0.01 K/UL (ref 0–0.04)
IMM GRANULOCYTES NFR BLD AUTO: 0.3 % (ref 0–0.5)
LYMPHOCYTES # BLD AUTO: 0.5 K/UL (ref 1–4.8)
LYMPHOCYTES NFR BLD: 14.6 % (ref 18–48)
MAGNESIUM SERPL-MCNC: 1.7 MG/DL (ref 1.6–2.6)
MCH RBC QN AUTO: 30.7 PG (ref 27–31)
MCHC RBC AUTO-ENTMCNC: 31.6 G/DL (ref 32–36)
MCV RBC AUTO: 97 FL (ref 82–98)
MONOCYTES # BLD AUTO: 0.5 K/UL (ref 0.3–1)
MONOCYTES NFR BLD: 14.3 % (ref 4–15)
NEUTROPHILS # BLD AUTO: 2.1 K/UL (ref 1.8–7.7)
NEUTROPHILS NFR BLD: 66.6 % (ref 38–73)
NRBC BLD-RTO: 0 /100 WBC
PHOSPHATE SERPL-MCNC: 3.8 MG/DL (ref 2.7–4.5)
PLATELET # BLD AUTO: 189 K/UL (ref 150–350)
PMV BLD AUTO: 10.2 FL (ref 9.2–12.9)
POTASSIUM SERPL-SCNC: 4.3 MMOL/L (ref 3.5–5.1)
RBC # BLD AUTO: 3.45 M/UL (ref 4–5.4)
SODIUM SERPL-SCNC: 143 MMOL/L (ref 136–145)
WBC # BLD AUTO: 3.14 K/UL (ref 3.9–12.7)

## 2019-05-27 PROCEDURE — 83735 ASSAY OF MAGNESIUM: CPT

## 2019-05-27 PROCEDURE — 99999 PR PBB SHADOW E&M-EST. PATIENT-LVL III: CPT | Mod: PBBFAC,,, | Performed by: INTERNAL MEDICINE

## 2019-05-27 PROCEDURE — 80197 ASSAY OF TACROLIMUS: CPT

## 2019-05-27 PROCEDURE — 85025 COMPLETE CBC W/AUTO DIFF WBC: CPT

## 2019-05-27 PROCEDURE — 99213 OFFICE O/P EST LOW 20 MIN: CPT | Mod: PBBFAC | Performed by: INTERNAL MEDICINE

## 2019-05-27 PROCEDURE — 99215 PR OFFICE/OUTPT VISIT, EST, LEVL V, 40-54 MIN: ICD-10-PCS | Mod: S$PBB,,, | Performed by: INTERNAL MEDICINE

## 2019-05-27 PROCEDURE — 83036 HEMOGLOBIN GLYCOSYLATED A1C: CPT

## 2019-05-27 PROCEDURE — 36415 COLL VENOUS BLD VENIPUNCTURE: CPT | Mod: PO

## 2019-05-27 PROCEDURE — 80069 RENAL FUNCTION PANEL: CPT

## 2019-05-27 PROCEDURE — 99215 OFFICE O/P EST HI 40 MIN: CPT | Mod: S$PBB,,, | Performed by: INTERNAL MEDICINE

## 2019-05-27 PROCEDURE — 99999 PR PBB SHADOW E&M-EST. PATIENT-LVL III: ICD-10-PCS | Mod: PBBFAC,,, | Performed by: INTERNAL MEDICINE

## 2019-05-27 NOTE — PROGRESS NOTES
Kidney Post-Transplant Assessment    Referring Physician: Dalton Heaton  Current Nephrologist: Dalton Heaton    ORGAN: LEFT KIDNEY  Donor Type: living  PHS Increased Risk: no  Cold Ischemia: 37 mins  Induction Medications: campath - alemtuzumab (anti-cd52)    Subjective:     CC:  Reassessment of renal allograft function and management of chronic immunosuppression.    HPI:  Ms. Newell is a 59 y.o. year old White female who received a living kidney transplant on 1/14/19. Her most recent creatinine is 1.1. She takes mycophenolate mofetil (held d/t recurrent ESBL UTIs Jan-Feb 2019) and tacrolimus for maintenance immunosuppression. Her post transplant course has been uncomplicated to date.    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 She briefly required HD 4/13/18 until ~Sept 2018.  she was predialysis at the time of transplant.   -LURD KT (friend) 01/14/2019,  Induced with Campath.     -Recurrent K pneumoniae ESBL 2019 UTI 1/30, 2/11, and  2/25  -Admit for unsteadiness and dizziness with work up WNL, MRI pending (r/s d/t hosp admit)  -admitted 05/07/2019 with fever infectious workup negative for bacteria.  Refractory anemia noted, and it was found a parvovirus B19 from 4/15/19 causing aplastic anemia was positive  -colonoscopy May 2019 for diarrhea unrevealing.    PCP PROPHYLAXIS: Until 1/14/19; Atovaquone; Bactrim stopped 01/24/2019 D/T hiK  CMV PROPHYLAXIS: CMV  Mismatch -study participant--study participation terminated May 2019 when pancytopenia noted, and it became clear she would require longer-term withholding of antiviral medication than was allowed in the research study. Rx until 7/14/19  FUNGAL PROPHYLAXIS: None    Andra last saw me in March, and since that time has experienced several health changes:  -significant bone marrow suppression persisted, and she was found to be positive for parvovirus B19.  She was treated with  "IVIG.  -she has had no recurrence of for ESBL UTIs  -she reports her energy level and shortness of breath have improved  -she is no longer having diarrhea, after her colonoscopy    Review of Systems   Constitutional: Negative for fever.   Eyes: Negative for visual disturbance.   Respiratory: Negative for shortness of breath.    Cardiovascular: Negative for chest pain and leg swelling.   Gastrointestinal: Negative.    Genitourinary: Negative for difficulty urinating.   Musculoskeletal: Negative.    Skin: Negative for rash.   Allergic/Immunologic: Positive for immunocompromised state.   Neurological: Negative for tremors.     Objective: /64 (BP Location: Right arm, Patient Position: Standing, BP Method: Large (Automatic))   Pulse 95   Temp 98.6 °F (37 °C) (Oral)   Resp 17   Ht 5' 7" (1.702 m)   Wt 92.3 kg (203 lb 7.8 oz)   LMP 02/28/2012   SpO2 98%   BMI 31.87 kg/m²       Physical Exam   Constitutional: No distress.   Cardiovascular: Normal rate and regular rhythm.   Pulmonary/Chest: Effort normal and breath sounds normal. No respiratory distress.   Abdominal: Soft. She exhibits no mass. There is no tenderness.   Musculoskeletal: She exhibits no edema.   Skin: Skin is warm and dry.   Psychiatric: She has a normal mood and affect.     Labs:  Lab Results   Component Value Date    WBC 3.14 (L) 05/27/2019    HGB 10.6 (L) 05/27/2019    HCT 33.5 (L) 05/27/2019     05/27/2019    K 4.3 05/27/2019     05/27/2019    CO2 26 05/27/2019    BUN 20 05/27/2019    CREATININE 1.1 05/27/2019    EGFRNONAA 54.3 (A) 05/27/2019    CALCIUM 9.4 05/27/2019    PHOS 3.8 05/27/2019    MG 1.7 05/27/2019    ALBUMIN 3.5 05/27/2019    AST 42 (H) 05/07/2019    ALT 46 (H) 05/07/2019    UTPCR 0.08 04/10/2019    .0 (H) 02/15/2019    TACROLIMUS 4.8 (L) 05/23/2019     Lab Results   Component Value Date    EXTANC 1,240 04/15/2019    EXTWBC 2.0 (L) 04/15/2019    EXTSEGS 62.0 04/15/2019    EXTPLATELETS 141 04/15/2019    " EXTHEMOGLOBI 8.7 (L) 04/15/2019    EXTHEMATOCRI 27.6 (L) 04/15/2019    EXTCREATININ 0.84 04/15/2019    EXTSODIUM 140 04/15/2019    EXTPOTASSIUM 4.3 04/15/2019    EXTBUN 13 04/15/2019    EXTCO2 23.0 04/15/2019    EXTCALCIUM 8.6 04/15/2019    EXTGLUCOSE 128 (H) 04/15/2019    EXTALBUMIN 3.4 04/15/2019    EXTAST 37 04/15/2019    EXTALT 35 (H) 04/15/2019    EXTBILITOTAL 0.8 04/15/2019     No results found for: EXTCYCLOSLVL, EXTSIROLIMUS, EXTTACROLVL, EXTPROTCRE, EXTPTHINTACT, EXTPROTEINUA, EXTWBCUA, EXTRBCUA    Labs were reviewed with the patient    Assessment:     1. CKD stage III (moderate)    2. Status post living-donor kidney transplantation    3. At risk for opportunistic infections    4. Prophylactic immunotherapy    5. Anemia of chronic disease    6. Long-term use of immunosuppressant medication    7. Parvovirus B19 infection    8. Aplastic anemia from parvovirus B19 infection    9. Hypomagnesemia        Plan:   -CKD3a s/p kidney transplantation, doing well. Continue to monitor renal function and electrolytes, HTN, secondary hyperparathyroidism and other issues related to underlying ESRD.      -aplastic anemia from parvovirus B 19 infection.  Already given IVIG with evidence of improvement.  Continue close monitoring.    -Continue tacrolimus-based immunosuppression. Goal tacro dropped to 5-7 after immuknow cylex came back very low and parvovirus diagnosis made, both indicative of over immunosuppression.  She remains off of CellCept because of history of diarrhea, neutropenia, and parvovirus.  Must watch very closely for drug toxicity and med-related side effects as well as balance risk of infections vs. Rejection in this very complex case.      -Recurrent ESBL UTIs seem to have abated.  Will keep a high index of suspicion.    -Hypomagnesemia- will continue magnesium oxide 800 mg daily;  Will tolerate mild asymptomatic low level d/t pill burden, DDI, and adverse SE     -OI prophylaxis:     -Bactrim d/c'd d/t  hyperkalemia, on atovaquone.     -Valcyte study medication for CMV prophylaxis held last admit; OK to resume.    -Fatigue & weakness:  Significantly improved as anemia resolving and parvovirus B19 being treated.  Continue to monitor  .  -diarrhea appears to have resolved.  Observe for symptoms.    -hypertension - BP ok watch to ensure does not get too low    -h/o mood disorder:  Cont pre-txp meds    -anemia multifactorial:?chronic disease and parvovirus induced aplastic anemia with known iron deficiency.  Weekly Procrit ordered to keep hgb 9-10 as per guidelines. Actually improving nicely after IVIG.    I am pleased to see that she is improving!  Nonetheless, we discussed that having to lower her immunosuppression to resolve the opportunistic parvovirus infection places her at increased risk for rejection down the road.  As such, I wish to follow monthly Allosure and side lmmunocylex levels.  She will have labs weekly (CBC, RP, tacro) for the time being including  q2week parvo PCR.    Follow-up:   Clinic: return to transplant clinic weekly for the first month after transplant; every 2 weeks during months 2-3; then at 6-, 9-, 12-, 18-, 24-, and 36- months post-transplant to reassess for complications from immunosuppression toxicity and monitor for rejection.  Annually thereafter.    Labs: since patient remains at high risk for rejection and drug-related complications that warrant close monitoring, labs will be ordered as follows: continue twice weekly CBC, renal panel, and drug level for first month; then same labs once weekly through 3rd month post-transplant.  Urine for UA and protein/creatinine ratio monthly.  Urine BK - PCR at 1-, 3-, 6-, 9-, 12-, 18-, 24-, and 36- months post-transplant.  Hepatic panel at 1-, 2-, 3-, 6-, 9-, 12-, 18-, 24-, and 36- months post-transplant.    Maureen Cabral MD

## 2019-05-27 NOTE — LETTER
May 27, 2019        Dalton Heaton  [address] [facility] NE[facility]ROLOG[facility] [place]  [place] [address]  Phone: [phone]  Fax: [phone]             [facility]  [address]  [facility] [address]  Phone: [phone]   Patient: Andra Newell   MR Number: [id number]   YOB: 1960   Date of Visit: 5/27/2019       Dear Dr. Dalton Heaton    Thank you for referring Andra Newell to me for evaluation. Attached you will find relevant portions of my assessment and plan of care.    If you have questions, please do not hesitate to call me. I look forward to following Andra Nweell along with you.    Sincerely,    Maureen Cabral MD    Enclosure    If you would like to receive this communication electronically, please contact [email or web address] or [phone] to request [employer] access.    [employer] is a tool which provides read-only access to select patient information with whom you have a relationship. Its easy to use and provides real time access to review your patients record including encounter summaries, notes, results, and demographic information.    If you feel you have received this communication in error or would no longer like to receive these types of communications, please e-mail [email or web address]

## 2019-05-27 NOTE — Clinical Note
I wish to follow monthly Allosure and side lmmunocylex levels.  She will have labs weekly (CBC, RP, tacro) for the time being including q2week parvo PCR.

## 2019-05-28 DIAGNOSIS — Z94.0 KIDNEY REPLACED BY TRANSPLANT: Primary | ICD-10-CM

## 2019-05-28 LAB
ESTIMATED AVG GLUCOSE: 157 MG/DL (ref 68–131)
HBA1C MFR BLD HPLC: 7.1 % (ref 4–5.6)
TACROLIMUS BLD-MCNC: 6.1 NG/ML (ref 5–15)

## 2019-05-29 ENCOUNTER — TELEPHONE (OUTPATIENT)
Dept: FAMILY MEDICINE | Facility: CLINIC | Age: 59
End: 2019-05-29

## 2019-05-29 DIAGNOSIS — D61.9 APLASTIC ANEMIA: ICD-10-CM

## 2019-05-29 DIAGNOSIS — Z94.0 KIDNEY TRANSPLANT RECIPIENT: Primary | ICD-10-CM

## 2019-05-29 LAB — CMV DNA SERPL NAA+PROBE-ACNC: NORMAL IU/ML

## 2019-05-29 NOTE — TELEPHONE ENCOUNTER
----- Message from REBECCA Valerio FNP-C sent at 5/29/2019 10:35 AM CDT -----  Pend for Elida Jaimes    ----- Message -----  From: Shania Lugo MA  Sent: 5/29/2019   9:38 AM  To: REBECCA Valerio FNP-C    Please see message below  ----- Message -----  From: Charleen Rosas  Sent: 5/28/2019  11:06 AM  To: [facility] Staff    Patient was referred to Dr. Ry Coreas, however the patient was dismissed from the practice by Dr. Baltazar Grissom 01/17/14 due to noncompliance with appointments. Please refer patient elsewhere. Let me know if you need anything else. Thanks.

## 2019-05-30 ENCOUNTER — PATIENT MESSAGE (OUTPATIENT)
Dept: FAMILY MEDICINE | Facility: CLINIC | Age: 59
End: 2019-05-30

## 2019-05-31 ENCOUNTER — OFFICE VISIT (OUTPATIENT)
Dept: INFECTIOUS DISEASES | Facility: CLINIC | Age: 59
End: 2019-05-31
Payer: MEDICARE

## 2019-05-31 ENCOUNTER — TELEPHONE (OUTPATIENT)
Dept: ENDOCRINOLOGY | Facility: CLINIC | Age: 59
End: 2019-05-31

## 2019-05-31 VITALS
WEIGHT: 204.13 LBS | SYSTOLIC BLOOD PRESSURE: 133 MMHG | HEART RATE: 90 BPM | TEMPERATURE: 99 F | HEIGHT: 67 IN | DIASTOLIC BLOOD PRESSURE: 77 MMHG | BODY MASS INDEX: 32.04 KG/M2

## 2019-05-31 DIAGNOSIS — B34.3 PARVOVIRUS B19 INFECTION: ICD-10-CM

## 2019-05-31 DIAGNOSIS — Z91.89 AT RISK FOR OPPORTUNISTIC INFECTIONS: ICD-10-CM

## 2019-05-31 DIAGNOSIS — Z94.0 KIDNEY REPLACED BY TRANSPLANT: ICD-10-CM

## 2019-05-31 DIAGNOSIS — F41.9 ANXIETY: Primary | ICD-10-CM

## 2019-05-31 DIAGNOSIS — Z87.440 HISTORY OF RECURRENT URINARY TRACT INFECTION: Primary | ICD-10-CM

## 2019-05-31 DIAGNOSIS — D61.9 APLASTIC ANEMIA: ICD-10-CM

## 2019-05-31 DIAGNOSIS — Z79.60 LONG-TERM USE OF IMMUNOSUPPRESSANT MEDICATION: ICD-10-CM

## 2019-05-31 PROCEDURE — 99214 PR OFFICE/OUTPT VISIT, EST, LEVL IV, 30-39 MIN: ICD-10-PCS | Mod: S$PBB,,, | Performed by: INTERNAL MEDICINE

## 2019-05-31 PROCEDURE — 99213 OFFICE O/P EST LOW 20 MIN: CPT | Mod: PBBFAC | Performed by: INTERNAL MEDICINE

## 2019-05-31 PROCEDURE — 99499 RISK ADDL DX/OHS AUDIT: ICD-10-PCS | Mod: S$PBB,,, | Performed by: INTERNAL MEDICINE

## 2019-05-31 PROCEDURE — 99214 OFFICE O/P EST MOD 30 MIN: CPT | Mod: S$PBB,,, | Performed by: INTERNAL MEDICINE

## 2019-05-31 PROCEDURE — 99499 UNLISTED E&M SERVICE: CPT | Mod: S$PBB,,, | Performed by: INTERNAL MEDICINE

## 2019-05-31 PROCEDURE — 99999 PR PBB SHADOW E&M-EST. PATIENT-LVL III: ICD-10-PCS | Mod: PBBFAC,,, | Performed by: INTERNAL MEDICINE

## 2019-05-31 PROCEDURE — 99999 PR PBB SHADOW E&M-EST. PATIENT-LVL III: CPT | Mod: PBBFAC,,, | Performed by: INTERNAL MEDICINE

## 2019-05-31 RX ORDER — ERGOCALCIFEROL 1.25 MG/1
CAPSULE ORAL
Qty: 4 CAPSULE | Refills: 0 | Status: SHIPPED | OUTPATIENT
Start: 2019-05-31 | End: 2019-06-30 | Stop reason: SDUPTHER

## 2019-05-31 NOTE — TELEPHONE ENCOUNTER
Hello patient is calling reporting elevated blood sugars. They have been going.. Advised to drop off blood sugar logs.

## 2019-05-31 NOTE — TELEPHONE ENCOUNTER
----- Message from Jazmyne Andino sent at 5/31/2019  1:32 PM CDT -----  Type: Needs Medical Advice    Who Called:  patient  Symptoms (please be specific):  Blood sugar is running around 260   How long has patient had these symptoms:  TA[facility]OR  Pharmacy name and phone #:  [facility]  Best Call Back Number: [phone]  Additional Information: blood sugar is running around 260 even with 2 types of insulin/unable to reach a nurse/please call

## 2019-05-31 NOTE — PROGRESS NOTES
Subjective:      Patient ID: Andra Newell is a 59 y.o. female.    Chief Complaint: Follow-up for parvovirus    History of Present Illness  59-year-old female with history of HTN, HLD, T2DM c/b ESRD s/p living kidney transplant 1/14/2019 CMV D+/R- alemtuzumab induction, on tacro MMF, removed from letermivir/acyclovir vs valganciclovir CMV prevention trial due to leukopenia, recurrent ESBL Kleb UTI presents for follow-up of parvovirus infection.    Since 3/2019 patient with recurrent admissions for fevers, diarrhea, ESLB Kleb UTIs, transfusion dependent anemia. In April, patient with positive parvovirus PCR but treatment held off given stable anemia.  Patient presented for admission early 5/2019 for fevers, malaise, fatigue, diarrhea.  Patient received course of IVIg for parvovirus.  Patient also underwent endoscopy and colonoscopy for work-up of persistent diarrhea which were unremarkable.  Patient was also taken out of letermivir study given persistent leukopenia.  MMF and tacro were also decreased.  Patient reported after all changes fevers, fatigue, malaise, diarrhea all resolved.  Patient currently feeling well, only some fatigue and GERD.     Review of Systems   Constitution: Positive for malaise/fatigue. Negative for chills, decreased appetite, fever, night sweats, weight gain and weight loss.   HENT: Negative for congestion, ear pain, hearing loss, hoarse voice, sore throat and tinnitus.    Eyes: Negative for blurred vision, redness and visual disturbance.   Cardiovascular: Negative for chest pain, leg swelling and palpitations.   Respiratory: Negative for cough, hemoptysis, shortness of breath and sputum production.    Hematologic/Lymphatic: Negative for adenopathy. Does not bruise/bleed easily.   Skin: Negative for dry skin, itching, rash and suspicious lesions.   Musculoskeletal: Positive for joint pain and myalgias. Negative for back pain and neck pain.   Gastrointestinal: Positive for heartburn.  Negative for abdominal pain, constipation, diarrhea, nausea and vomiting.   Genitourinary: Positive for flank pain. Negative for dysuria, frequency, hematuria, hesitancy and urgency.   Neurological: Positive for dizziness and weakness. Negative for headaches, numbness and paresthesias.   Psychiatric/Behavioral: Positive for depression. Negative for memory loss. The patient does not have insomnia and is not nervous/anxious.      Objective:   Physical Exam   Constitutional: She is oriented to person, place, and time. She appears well-developed and well-nourished. No distress.   HENT:   Head: Normocephalic and atraumatic.   Eyes: Conjunctivae and EOM are normal.   Neck: Normal range of motion. Neck supple.   Pulmonary/Chest: Effort normal. No respiratory distress.   Abdominal: Soft. She exhibits no distension.   Musculoskeletal: Normal range of motion. She exhibits no edema.   Neurological: She is alert and oriented to person, place, and time.   Skin: Skin is warm and dry. No rash noted. She is not diaphoretic. No erythema.   Psychiatric: She has a normal mood and affect. Her behavior is normal.   Vitals reviewed.    Significant results reviewed:     Ref Range & Units 7d ago   WBC 3.90 - 12.70 K/uL 3.14Low     RBC 4.00 - 5.40 M/uL 3.45Low     Hemoglobin 12.0 - 16.0 g/dL 10.6Low     Hematocrit 37.0 - 48.5 % 33.5Low     Mean Corpuscular Volume 82 - 98 fL 97    Mean Corpuscular Hemoglobin 27.0 - 31.0 pg 30.7    Mean Corpuscular Hemoglobin Conc 32.0 - 36.0 g/dL 31.6Low     RDW 11.5 - 14.5 % 18.1High     Platelets 150 - 350 K/uL 189    MPV 9.2 - 12.9 fL 10.2    Immature Granulocytes 0.0 - 0.5 % 0.3    Gran # (ANC) 1.8 - 7.7 K/uL 2.1    Immature Grans (Abs) 0.00 - 0.04 K/uL 0.01    Comment: Mild elevation in immature granulocytes is non specific and   can be seen in a variety of conditions including stress response,   acute inflammation, trauma and pregnancy. Correlation with other   laboratory and clinical findings is  essential.    Lymph # 1.0 - 4.8 K/uL 0.5Low     Mono # 0.3 - 1.0 K/uL 0.5    Eos # 0.0 - 0.5 K/uL 0.1    Baso # 0.00 - 0.20 K/uL 0.03    nRBC 0 /100 WBC 0    Gran% 38.0 - 73.0 % 66.6    Lymph% 18.0 - 48.0 % 14.6Low     Mono% 4.0 - 15.0 % 14.3    Eosinophil% 0.0 - 8.0 % 3.2    Basophil% 0.0 - 1.9 % 1.0             Ref Range & Units 7d ago   Glucose 70 - 110 mg/dL 200High     Sodium 136 - 145 mmol/L 143    Potassium 3.5 - 5.1 mmol/L 4.3    Chloride 95 - 110 mmol/L 107    CO2 23 - 29 mmol/L 26    BUN, Bld 8 - 23 mg/dL 20    Calcium 8.7 - 10.5 mg/dL 9.4    Creatinine 0.5 - 1.4 mg/dL 1.1    Albumin 3.5 - 5.2 g/dL 3.5    Phosphorus 2.7 - 4.5 mg/dL 3.8    eGFR if African American >60 mL/min/1.73 m^2 >60.0    eGFR if non African American >60 mL/min/1.73 m^2 54.3Abnormal     Anion Gap 8 - 16 mmol/L 10             Ref Range & Units 7d ago   Cytomegalovirus PCR, Quant Undetected IU/mL Undetected        Blood cultures  5/6/2019 x2 negative    Urine culture  5/7/2019 negative    5/7/2019  E. Coli negative  Stool culture negative  C. Difficile PCR neg    5/7/2019  CT abd/pelvis  1. Stably positioned right lower quadrant renal allograft in place with slight adjacent peritransplant fat stranding and minimal fluid similar to most recent CT examination.  No evidence of hydronephrosis.  Further evaluation with dedicated transplant ultrasound as warranted.  2. Minimal residual fluid along the right abdominopelvic incision, also similar to prior exam.  3. Findings in keeping with medical renal disease with bilateral renal atrophy, renal cysts and subcentimeter renal hypodensities too small to definitively characterize.  4. Cholecystectomy, small hiatal hernia, diverticulosis without evidence to suggest diverticulitis.    5/9/2019  UGI  - Normal esophagus.  - Nodular mucosa in the prepyloric region of the stomach. Biopsied.  - Normal examined duodenum. Biopsied.    Colonoscopy  - Diverticulosis in the sigmoid colon.                         - The entire examined colon is normal. Biopsied.                        - One 3 mm polyp at the splenic flexure, removed                         with a cold biopsy forceps. Resected and retrieved.    Pathology  SPECIMEN  1) Duodenum, second portion, grossly normal. Transplant patient with chronic diarrhea. Rule out  infectious process, rule out CMV.  2) Stomach, grossly normal. Transplant patient with chronic diarrhea. Rule out infectious process, rule  out CMV.  3) Ascending colon, grossly normal. Rule out microscopic colitis, rule out infectious colitis.  4) Splenic flexure, small polyp. Rule out adenoma.  5) Descending colon, grossly normal. Rule out microscopic colitis.  FINAL PATHOLOGIC DIAGNOSIS  1. Duodenum, 2nd portion, biopsy:  - Duodenal mucosa with no significant histologic abnormality, see comment.  2. Stomach, biopsy:  - Antral mucosa with mild chronic gastritis.  - Oxyntic mucosa with no significant histologic abnormality.  - Immunostain for H. pylori is negative.  - See comment.  3. Colon, ascending, biopsy:  - Colonic mucosa with no significant histologic abnormality, see comment.  4. Colon, splenic flexure, small polyp, biopsy:  - Tubular adenoma.  5. Colon, descending, biopsy:  - Colonic mucosa with no significant histologic abnormality, see comment.  COMMENT: Immunostains for cytomegalovirus performed on specimens 1, 2, 3, and 5 are negative. Immunostain  for H pylori performed on specimen 2 is negative. Appropriate positive controls are examined.    Assessment:   59-year-old female with history of HTN, HLD, T2DM c/b ESRD s/p living kidney transplant 1/14/2019 CMV D+/R- alemtuzumab induction, on tacro MMF, removed from letermivir/acyclovir vs valganciclovir CMV prevention trial due to leukopenia, recurrent ESBL Kleb UTI presents for follow-up of parvovirus infection s/p IVIg.    Patient reports resolution of UTI, diarrhea, and anemia after course of IVIg, withdrawal from letermovir trial, and  decrease in immunosuppression.      Plan:   - No additional work-up needed  - Follow-up GALINDO Kessler MD MPH  Infectious Diseases [facility]

## 2019-06-02 RX ORDER — LORAZEPAM 2 MG/1
TABLET ORAL
Qty: 16 TABLET | Refills: 0 | Status: SHIPPED | OUTPATIENT
Start: 2019-06-02 | End: 2019-06-10 | Stop reason: SDUPTHER

## 2019-06-03 ENCOUNTER — LAB VISIT (OUTPATIENT)
Dept: LAB | Facility: HOSPITAL | Age: 59
End: 2019-06-03
Attending: INTERNAL MEDICINE
Payer: MEDICARE

## 2019-06-03 ENCOUNTER — PATIENT MESSAGE (OUTPATIENT)
Dept: FAMILY MEDICINE | Facility: CLINIC | Age: 59
End: 2019-06-03

## 2019-06-03 ENCOUNTER — PATIENT MESSAGE (OUTPATIENT)
Dept: TRANSPLANT | Facility: CLINIC | Age: 59
End: 2019-06-03

## 2019-06-03 DIAGNOSIS — Z20.828 PARVOVIRUS EXPOSURE: ICD-10-CM

## 2019-06-03 DIAGNOSIS — Z94.0 KIDNEY REPLACED BY TRANSPLANT: ICD-10-CM

## 2019-06-03 PROBLEM — Z87.440 HISTORY OF RECURRENT URINARY TRACT INFECTION: Status: RESOLVED | Noted: 2019-04-12 | Resolved: 2019-06-03

## 2019-06-03 LAB
ALBUMIN SERPL BCP-MCNC: 3.6 G/DL (ref 3.5–5.2)
ANION GAP SERPL CALC-SCNC: 9 MMOL/L (ref 8–16)
BASOPHILS # BLD AUTO: 0.04 K/UL (ref 0–0.2)
BASOPHILS NFR BLD: 0.7 % (ref 0–1.9)
BUN SERPL-MCNC: 15 MG/DL (ref 8–23)
CALCIUM SERPL-MCNC: 9.9 MG/DL (ref 8.7–10.5)
CHLORIDE SERPL-SCNC: 106 MMOL/L (ref 95–110)
CO2 SERPL-SCNC: 23 MMOL/L (ref 23–29)
CREAT SERPL-MCNC: 1 MG/DL (ref 0.5–1.4)
DIFFERENTIAL METHOD: ABNORMAL
EOSINOPHIL # BLD AUTO: 0.2 K/UL (ref 0–0.5)
EOSINOPHIL NFR BLD: 2.8 % (ref 0–8)
ERYTHROCYTE [DISTWIDTH] IN BLOOD BY AUTOMATED COUNT: 17.3 % (ref 11.5–14.5)
EST. GFR  (AFRICAN AMERICAN): >60 ML/MIN/1.73 M^2
EST. GFR  (NON AFRICAN AMERICAN): >60 ML/MIN/1.73 M^2
GLUCOSE SERPL-MCNC: 246 MG/DL (ref 70–110)
HCT VFR BLD AUTO: 34.8 % (ref 37–48.5)
HGB BLD-MCNC: 11.4 G/DL (ref 12–16)
IMM GRANULOCYTES # BLD AUTO: 0.02 K/UL (ref 0–0.04)
IMM GRANULOCYTES NFR BLD AUTO: 0.4 % (ref 0–0.5)
LYMPHOCYTES # BLD AUTO: 0.5 K/UL (ref 1–4.8)
LYMPHOCYTES NFR BLD: 9.3 % (ref 18–48)
MAGNESIUM SERPL-MCNC: 1.2 MG/DL (ref 1.6–2.6)
MCH RBC QN AUTO: 31.5 PG (ref 27–31)
MCHC RBC AUTO-ENTMCNC: 32.8 G/DL (ref 32–36)
MCV RBC AUTO: 96 FL (ref 82–98)
MONOCYTES # BLD AUTO: 0.4 K/UL (ref 0.3–1)
MONOCYTES NFR BLD: 6.9 % (ref 4–15)
NEUTROPHILS # BLD AUTO: 4.3 K/UL (ref 1.8–7.7)
NEUTROPHILS NFR BLD: 79.9 % (ref 38–73)
NRBC BLD-RTO: 0 /100 WBC
PHOSPHATE SERPL-MCNC: 3.7 MG/DL (ref 2.7–4.5)
PLATELET # BLD AUTO: 193 K/UL (ref 150–350)
PMV BLD AUTO: 10.5 FL (ref 9.2–12.9)
POTASSIUM SERPL-SCNC: 4.4 MMOL/L (ref 3.5–5.1)
RBC # BLD AUTO: 3.62 M/UL (ref 4–5.4)
SODIUM SERPL-SCNC: 138 MMOL/L (ref 136–145)
WBC # BLD AUTO: 5.39 K/UL (ref 3.9–12.7)

## 2019-06-03 PROCEDURE — 80197 ASSAY OF TACROLIMUS: CPT

## 2019-06-03 PROCEDURE — 80069 RENAL FUNCTION PANEL: CPT

## 2019-06-03 PROCEDURE — 86352 CELL FUNCTION ASSAY W/STIM: CPT

## 2019-06-03 PROCEDURE — 36415 COLL VENOUS BLD VENIPUNCTURE: CPT | Mod: PO

## 2019-06-03 PROCEDURE — 85025 COMPLETE CBC W/AUTO DIFF WBC: CPT

## 2019-06-03 PROCEDURE — 87799 DETECT AGENT NOS DNA QUANT: CPT

## 2019-06-03 PROCEDURE — 83735 ASSAY OF MAGNESIUM: CPT

## 2019-06-03 NOTE — TELEPHONE ENCOUNTER
PT WANTS TO KNOW IF YOU ARE LISTED AS AN [facility] DOCTOR?  I KNOW WE ARE PARTNERED WITH [facility] BUT YOU ARE NOT [facility], YOU ARE EMPLOYEE OF [facility].....  CORRECT?

## 2019-06-04 ENCOUNTER — TELEPHONE (OUTPATIENT)
Dept: TRANSPLANT | Facility: CLINIC | Age: 59
End: 2019-06-04

## 2019-06-04 ENCOUNTER — PATIENT MESSAGE (OUTPATIENT)
Dept: TRANSPLANT | Facility: CLINIC | Age: 59
End: 2019-06-04

## 2019-06-04 LAB — TACROLIMUS BLD-MCNC: 6.5 NG/ML (ref 5–15)

## 2019-06-04 NOTE — TELEPHONE ENCOUNTER
Patient repeated back and voice a understanding and states she ran of mag x 3 days and is picking up her refills today.  High mag diet reviewed with patient.

## 2019-06-04 NOTE — TELEPHONE ENCOUNTER
----- Message from Maureen Cabral MD sent at 6/3/2019 10:55 PM CDT -----  The following message sent to patient via [facility]: Please confirm how much magnesium you are taking, and whether you are having any GI issues. Kidney function looks great!

## 2019-06-05 LAB — IMMUNKNOW (STIMULATED): 61 NG/ML (ref 226–524)

## 2019-06-08 ENCOUNTER — PATIENT MESSAGE (OUTPATIENT)
Dept: ENDOCRINOLOGY | Facility: CLINIC | Age: 59
End: 2019-06-08

## 2019-06-08 ENCOUNTER — NURSE TRIAGE (OUTPATIENT)
Dept: ADMINISTRATIVE | Facility: CLINIC | Age: 59
End: 2019-06-08

## 2019-06-08 NOTE — TELEPHONE ENCOUNTER
Patient called to report the following:     -cbg is 309 today   -has been taking insulin 10 units with meals and s/s, also basaglar bedtime   -loose stools within 2-5 / day for the past couple of days   -running high since last, no changes diet, medication   -loose balance when standing up for 3 weeks   -Trisha Van notified and advise pt via pc and advised pt to Increase Basaglar 35 units and 12 units with meals and s/s. Patient verbalized understanding     Reason for Disposition   Caller has URGENT medication or insulin pump question and triager unable to answer question    Protocols used: DIABETES - HIGH BLOOD SUGAR-A-

## 2019-06-09 PROBLEM — E78.6 LOW HDL (UNDER 40): Status: ACTIVE | Noted: 2019-06-09

## 2019-06-09 PROBLEM — D70.9 NEUTROPENIA, UNSPECIFIED: Status: RESOLVED | Noted: 2019-05-08 | Resolved: 2019-06-09

## 2019-06-09 PROBLEM — E78.1 HYPERTRIGLYCERIDEMIA WITHOUT HYPERCHOLESTEROLEMIA: Status: ACTIVE | Noted: 2019-06-09

## 2019-06-10 ENCOUNTER — OFFICE VISIT (OUTPATIENT)
Dept: FAMILY MEDICINE | Facility: CLINIC | Age: 59
End: 2019-06-10
Payer: MEDICARE

## 2019-06-10 ENCOUNTER — PATIENT MESSAGE (OUTPATIENT)
Dept: TRANSPLANT | Facility: CLINIC | Age: 59
End: 2019-06-10

## 2019-06-10 ENCOUNTER — LAB VISIT (OUTPATIENT)
Dept: LAB | Facility: HOSPITAL | Age: 59
End: 2019-06-10
Attending: INTERNAL MEDICINE
Payer: MEDICARE

## 2019-06-10 VITALS
RESPIRATION RATE: 12 BRPM | WEIGHT: 199 LBS | HEART RATE: 86 BPM | TEMPERATURE: 98 F | BODY MASS INDEX: 31.23 KG/M2 | OXYGEN SATURATION: 97 % | DIASTOLIC BLOOD PRESSURE: 64 MMHG | SYSTOLIC BLOOD PRESSURE: 106 MMHG | HEIGHT: 67 IN

## 2019-06-10 DIAGNOSIS — Z94.0 STATUS POST LIVING-DONOR KIDNEY TRANSPLANTATION: ICD-10-CM

## 2019-06-10 DIAGNOSIS — E78.1 HYPERTRIGLYCERIDEMIA WITHOUT HYPERCHOLESTEROLEMIA: ICD-10-CM

## 2019-06-10 DIAGNOSIS — Z94.0 KIDNEY TRANSPLANT RECIPIENT: ICD-10-CM

## 2019-06-10 DIAGNOSIS — E78.6 LOW HDL (UNDER 40): ICD-10-CM

## 2019-06-10 DIAGNOSIS — F41.9 ANXIETY: ICD-10-CM

## 2019-06-10 DIAGNOSIS — I95.1 ORTHOSTATIC HYPOTENSION: ICD-10-CM

## 2019-06-10 DIAGNOSIS — H61.22 EXCESSIVE EAR WAX, LEFT: ICD-10-CM

## 2019-06-10 DIAGNOSIS — E55.9 VITAMIN D DEFICIENCY: ICD-10-CM

## 2019-06-10 DIAGNOSIS — R74.01 ELEVATED ALT MEASUREMENT: ICD-10-CM

## 2019-06-10 DIAGNOSIS — F32.9 REACTIVE DEPRESSION: ICD-10-CM

## 2019-06-10 DIAGNOSIS — Z94.0 KIDNEY REPLACED BY TRANSPLANT: ICD-10-CM

## 2019-06-10 DIAGNOSIS — I10 ESSENTIAL HYPERTENSION: Primary | ICD-10-CM

## 2019-06-10 LAB
ALBUMIN SERPL BCP-MCNC: 3.9 G/DL (ref 3.5–5.2)
ANION GAP SERPL CALC-SCNC: 11 MMOL/L (ref 8–16)
BASOPHILS # BLD AUTO: 0.02 K/UL (ref 0–0.2)
BASOPHILS NFR BLD: 0.3 % (ref 0–1.9)
BUN SERPL-MCNC: 22 MG/DL (ref 8–23)
CALCIUM SERPL-MCNC: 10.1 MG/DL (ref 8.7–10.5)
CHLORIDE SERPL-SCNC: 104 MMOL/L (ref 95–110)
CO2 SERPL-SCNC: 25 MMOL/L (ref 23–29)
CREAT SERPL-MCNC: 1.4 MG/DL (ref 0.5–1.4)
DIFFERENTIAL METHOD: ABNORMAL
EOSINOPHIL # BLD AUTO: 0.2 K/UL (ref 0–0.5)
EOSINOPHIL NFR BLD: 2.8 % (ref 0–8)
ERYTHROCYTE [DISTWIDTH] IN BLOOD BY AUTOMATED COUNT: 17.1 % (ref 11.5–14.5)
EST. GFR  (AFRICAN AMERICAN): 46.8 ML/MIN/1.73 M^2
EST. GFR  (NON AFRICAN AMERICAN): 40.6 ML/MIN/1.73 M^2
GLUCOSE SERPL-MCNC: 255 MG/DL (ref 70–110)
HCT VFR BLD AUTO: 38.4 % (ref 37–48.5)
HGB BLD-MCNC: 12.1 G/DL (ref 12–16)
IMM GRANULOCYTES # BLD AUTO: 0.02 K/UL (ref 0–0.04)
IMM GRANULOCYTES NFR BLD AUTO: 0.3 % (ref 0–0.5)
LYMPHOCYTES # BLD AUTO: 0.7 K/UL (ref 1–4.8)
LYMPHOCYTES NFR BLD: 10.9 % (ref 18–48)
MAGNESIUM SERPL-MCNC: 1.6 MG/DL (ref 1.6–2.6)
MCH RBC QN AUTO: 31.3 PG (ref 27–31)
MCHC RBC AUTO-ENTMCNC: 31.5 G/DL (ref 32–36)
MCV RBC AUTO: 99 FL (ref 82–98)
MONOCYTES # BLD AUTO: 0.4 K/UL (ref 0.3–1)
MONOCYTES NFR BLD: 6.5 % (ref 4–15)
NEUTROPHILS # BLD AUTO: 4.8 K/UL (ref 1.8–7.7)
NEUTROPHILS NFR BLD: 79.2 % (ref 38–73)
NRBC BLD-RTO: 0 /100 WBC
PHOSPHATE SERPL-MCNC: 3.3 MG/DL (ref 2.7–4.5)
PLATELET # BLD AUTO: 209 K/UL (ref 150–350)
PMV BLD AUTO: 10.3 FL (ref 9.2–12.9)
POTASSIUM SERPL-SCNC: 4.7 MMOL/L (ref 3.5–5.1)
RBC # BLD AUTO: 3.87 M/UL (ref 4–5.4)
SODIUM SERPL-SCNC: 140 MMOL/L (ref 136–145)
WBC # BLD AUTO: 6.12 K/UL (ref 3.9–12.7)

## 2019-06-10 PROCEDURE — 80069 RENAL FUNCTION PANEL: CPT

## 2019-06-10 PROCEDURE — 36415 COLL VENOUS BLD VENIPUNCTURE: CPT | Mod: PO

## 2019-06-10 PROCEDURE — 85025 COMPLETE CBC W/AUTO DIFF WBC: CPT

## 2019-06-10 PROCEDURE — 99214 OFFICE O/P EST MOD 30 MIN: CPT | Mod: ,,, | Performed by: INTERNAL MEDICINE

## 2019-06-10 PROCEDURE — 80197 ASSAY OF TACROLIMUS: CPT

## 2019-06-10 PROCEDURE — 83735 ASSAY OF MAGNESIUM: CPT

## 2019-06-10 PROCEDURE — 99214 PR OFFICE/OUTPT VISIT, EST, LEVL IV, 30-39 MIN: ICD-10-PCS | Mod: ,,, | Performed by: INTERNAL MEDICINE

## 2019-06-10 RX ORDER — VILAZODONE HYDROCHLORIDE 40 MG/1
40 TABLET ORAL DAILY
Qty: 30 TABLET | Refills: 4 | Status: SHIPPED | OUTPATIENT
Start: 2019-06-10 | End: 2021-09-09 | Stop reason: SDUPTHER

## 2019-06-10 RX ORDER — LORAZEPAM 2 MG/1
2 TABLET ORAL 2 TIMES DAILY
Qty: 60 TABLET | Refills: 1 | Status: SHIPPED | OUTPATIENT
Start: 2019-06-10 | End: 2019-09-09

## 2019-06-10 RX ORDER — INSULIN ASPART 100 [IU]/ML
6 INJECTION, SOLUTION INTRAVENOUS; SUBCUTANEOUS
Qty: 15 ML | Refills: 6 | Status: SHIPPED | OUTPATIENT
Start: 2019-06-10 | End: 2019-06-28

## 2019-06-10 NOTE — PROGRESS NOTES
"  SUBJECTIVE:    Patient ID: Andra Newell is a 59 y.o. female.    Chief Complaint: Anemia and Follow-up    HPI     This patient is post renal transplant, whose postop course was complicated by sepsis(found to be parvovirus)(got 4 pints of blood ), and recurring urinary tract infections, now returns for follow-up with primary care. She sees us for  diabetes and hypertension. Recent laboratory studies include hemoglobin of 11.4, hematocrit of 34.8. WBC is normal at 6500. Platelets are normal at 193,000. Magnesium was low at 1.2 but sugar was high at 246. BUN 9.9 creatinine 1.0.    Glucose has been high-she is being followed by Endocrinology-35 u basaglar plus 10 units novolog plus a ss about 24 units immediate acting-Has been feeling good until yesterday when she was very weak and nauseated-no sx today    Magnesium is being treated and despite slo-mag she has diarrhea-    New issues- (1)When she is sleeping or when she is sitting and she gets up she has what she calls a gait abnormality-takes two steps forward and backwards before she gets her balance-associated with a feeling she just cant keep her balance-short-lived                            (2)Ear congestion left ear-three weeks-no preceeding infection-it was stopped up and could not hear anything-went to Urgent Care and the ear was "cleaned out"-still has a hollow sensation in it and cannot hear    Lab Visit on 06/10/2019   Component Date Value Ref Range Status    WBC 06/10/2019 6.12  3.90 - 12.70 K/uL Final    RBC 06/10/2019 3.87* 4.00 - 5.40 M/uL Final    Hemoglobin 06/10/2019 12.1  12.0 - 16.0 g/dL Final    Hematocrit 06/10/2019 38.4  37.0 - 48.5 % Final    Mean Corpuscular Volume 06/10/2019 99* 82 - 98 fL Final    Mean Corpuscular Hemoglobin 06/10/2019 31.3* 27.0 - 31.0 pg Final    Mean Corpuscular Hemoglobin Conc 06/10/2019 31.5* 32.0 - 36.0 g/dL Final    RDW 06/10/2019 17.1* 11.5 - 14.5 % Final    Platelets 06/10/2019 209  150 - 350 K/uL " Final    MPV 06/10/2019 10.3  9.2 - 12.9 fL Final    Immature Granulocytes 06/10/2019 0.3  0.0 - 0.5 % Final    Gran # (ANC) 06/10/2019 4.8  1.8 - 7.7 K/uL Final    Immature Grans (Abs) 06/10/2019 0.02  0.00 - 0.04 K/uL Final    Lymph # 06/10/2019 0.7* 1.0 - 4.8 K/uL Final    Mono # 06/10/2019 0.4  0.3 - 1.0 K/uL Final    Eos # 06/10/2019 0.2  0.0 - 0.5 K/uL Final    Baso # 06/10/2019 0.02  0.00 - 0.20 K/uL Final    nRBC 06/10/2019 0  0 /100 WBC Final    Gran% 06/10/2019 79.2* 38.0 - 73.0 % Final    Lymph% 06/10/2019 10.9* 18.0 - 48.0 % Final    Mono% 06/10/2019 6.5  4.0 - 15.0 % Final    Eosinophil% 06/10/2019 2.8  0.0 - 8.0 % Final    Basophil% 06/10/2019 0.3  0.0 - 1.9 % Final    Differential Method 06/10/2019 Automated   Final   Lab Visit on 06/03/2019   Component Date Value Ref Range Status    Tacrolimus Lvl 06/03/2019 6.5  5.0 - 15.0 ng/mL Final    ImmunKnow (Stimulated) 06/03/2019 61* 226 - 524 ng/mL Final    WBC 06/03/2019 5.39  3.90 - 12.70 K/uL Final    RBC 06/03/2019 3.62* 4.00 - 5.40 M/uL Final    Hemoglobin 06/03/2019 11.4* 12.0 - 16.0 g/dL Final    Hematocrit 06/03/2019 34.8* 37.0 - 48.5 % Final    Mean Corpuscular Volume 06/03/2019 96  82 - 98 fL Final    Mean Corpuscular Hemoglobin 06/03/2019 31.5* 27.0 - 31.0 pg Final    Mean Corpuscular Hemoglobin Conc 06/03/2019 32.8  32.0 - 36.0 g/dL Final    RDW 06/03/2019 17.3* 11.5 - 14.5 % Final    Platelets 06/03/2019 193  150 - 350 K/uL Final    MPV 06/03/2019 10.5  9.2 - 12.9 fL Final    Immature Granulocytes 06/03/2019 0.4  0.0 - 0.5 % Final    Gran # (ANC) 06/03/2019 4.3  1.8 - 7.7 K/uL Final    Immature Grans (Abs) 06/03/2019 0.02  0.00 - 0.04 K/uL Final    Lymph # 06/03/2019 0.5* 1.0 - 4.8 K/uL Final    Mono # 06/03/2019 0.4  0.3 - 1.0 K/uL Final    Eos # 06/03/2019 0.2  0.0 - 0.5 K/uL Final    Baso # 06/03/2019 0.04  0.00 - 0.20 K/uL Final    nRBC 06/03/2019 0  0 /100 WBC Final    Gran% 06/03/2019 79.9* 38.0 - 73.0  % Final    Lymph% 06/03/2019 9.3* 18.0 - 48.0 % Final    Mono% 06/03/2019 6.9  4.0 - 15.0 % Final    Eosinophil% 06/03/2019 2.8  0.0 - 8.0 % Final    Basophil% 06/03/2019 0.7  0.0 - 1.9 % Final    Differential Method 06/03/2019 Automated   Final    Glucose 06/03/2019 246* 70 - 110 mg/dL Final    Sodium 06/03/2019 138  136 - 145 mmol/L Final    Potassium 06/03/2019 4.4  3.5 - 5.1 mmol/L Final    Chloride 06/03/2019 106  95 - 110 mmol/L Final    CO2 06/03/2019 23  23 - 29 mmol/L Final    BUN, Bld 06/03/2019 15  8 - 23 mg/dL Final    Calcium 06/03/2019 9.9  8.7 - 10.5 mg/dL Final    Creatinine 06/03/2019 1.0  0.5 - 1.4 mg/dL Final    Albumin 06/03/2019 3.6  3.5 - 5.2 g/dL Final    Phosphorus 06/03/2019 3.7  2.7 - 4.5 mg/dL Final    eGFR if African American 06/03/2019 >60.0  >60 mL/min/1.73 m^2 Final    eGFR if non African American 06/03/2019 >60.0  >60 mL/min/1.73 m^2 Final    Anion Gap 06/03/2019 9  8 - 16 mmol/L Final    Magnesium 06/03/2019 1.2* 1.6 - 2.6 mg/dL Final    Miscellaneous Test Name 06/03/2019 Allosure   Final   Lab Visit on 05/27/2019   Component Date Value Ref Range Status    Tacrolimus Lvl 05/27/2019 6.1  5.0 - 15.0 ng/mL Final    WBC 05/27/2019 3.14* 3.90 - 12.70 K/uL Final    RBC 05/27/2019 3.45* 4.00 - 5.40 M/uL Final    Hemoglobin 05/27/2019 10.6* 12.0 - 16.0 g/dL Final    Hematocrit 05/27/2019 33.5* 37.0 - 48.5 % Final    Mean Corpuscular Volume 05/27/2019 97  82 - 98 fL Final    Mean Corpuscular Hemoglobin 05/27/2019 30.7  27.0 - 31.0 pg Final    Mean Corpuscular Hemoglobin Conc 05/27/2019 31.6* 32.0 - 36.0 g/dL Final    RDW 05/27/2019 18.1* 11.5 - 14.5 % Final    Platelets 05/27/2019 189  150 - 350 K/uL Final    MPV 05/27/2019 10.2  9.2 - 12.9 fL Final    Immature Granulocytes 05/27/2019 0.3  0.0 - 0.5 % Final    Gran # (ANC) 05/27/2019 2.1  1.8 - 7.7 K/uL Final    Immature Grans (Abs) 05/27/2019 0.01  0.00 - 0.04 K/uL Final    Lymph # 05/27/2019 0.5* 1.0 - 4.8  K/uL Final    Mono # 05/27/2019 0.5  0.3 - 1.0 K/uL Final    Eos # 05/27/2019 0.1  0.0 - 0.5 K/uL Final    Baso # 05/27/2019 0.03  0.00 - 0.20 K/uL Final    nRBC 05/27/2019 0  0 /100 WBC Final    Gran% 05/27/2019 66.6  38.0 - 73.0 % Final    Lymph% 05/27/2019 14.6* 18.0 - 48.0 % Final    Mono% 05/27/2019 14.3  4.0 - 15.0 % Final    Eosinophil% 05/27/2019 3.2  0.0 - 8.0 % Final    Basophil% 05/27/2019 1.0  0.0 - 1.9 % Final    Differential Method 05/27/2019 Automated   Final    Glucose 05/27/2019 200* 70 - 110 mg/dL Final    Sodium 05/27/2019 143  136 - 145 mmol/L Final    Potassium 05/27/2019 4.3  3.5 - 5.1 mmol/L Final    Chloride 05/27/2019 107  95 - 110 mmol/L Final    CO2 05/27/2019 26  23 - 29 mmol/L Final    BUN, Bld 05/27/2019 20  8 - 23 mg/dL Final    Calcium 05/27/2019 9.4  8.7 - 10.5 mg/dL Final    Creatinine 05/27/2019 1.1  0.5 - 1.4 mg/dL Final    Albumin 05/27/2019 3.5  3.5 - 5.2 g/dL Final    Phosphorus 05/27/2019 3.8  2.7 - 4.5 mg/dL Final    eGFR if African American 05/27/2019 >60.0  >60 mL/min/1.73 m^2 Final    eGFR if non African American 05/27/2019 54.3* >60 mL/min/1.73 m^2 Final    Anion Gap 05/27/2019 10  8 - 16 mmol/L Final    Magnesium 05/27/2019 1.7  1.6 - 2.6 mg/dL Final    Cytomegalovirus PCR, Quant 05/27/2019 Undetected  Undetected IU/mL Final    Hemoglobin A1C 05/27/2019 7.1* 4.0 - 5.6 % Final    Estimated Avg Glucose 05/27/2019 157* 68 - 131 mg/dL Final   Lab Visit on 05/23/2019   Component Date Value Ref Range Status    Tacrolimus Lvl 05/23/2019 4.8* 5.0 - 15.0 ng/mL Final    WBC 05/23/2019 4.07  3.90 - 12.70 K/uL Final    RBC 05/23/2019 3.51* 4.00 - 5.40 M/uL Final    Hemoglobin 05/23/2019 10.7* 12.0 - 16.0 g/dL Final    Hematocrit 05/23/2019 34.4* 37.0 - 48.5 % Final    Mean Corpuscular Volume 05/23/2019 98  82 - 98 fL Final    Mean Corpuscular Hemoglobin 05/23/2019 30.5  27.0 - 31.0 pg Final    Mean Corpuscular Hemoglobin Conc 05/23/2019 31.1* 32.0  - 36.0 g/dL Final    RDW 05/23/2019 18.2* 11.5 - 14.5 % Final    Platelets 05/23/2019 231  150 - 350 K/uL Final    MPV 05/23/2019 10.4  9.2 - 12.9 fL Final    Immature Granulocytes 05/23/2019 0.7* 0.0 - 0.5 % Final    Gran # (ANC) 05/23/2019 2.9  1.8 - 7.7 K/uL Final    Immature Grans (Abs) 05/23/2019 0.03  0.00 - 0.04 K/uL Final    Lymph # 05/23/2019 0.5* 1.0 - 4.8 K/uL Final    Mono # 05/23/2019 0.5  0.3 - 1.0 K/uL Final    Eos # 05/23/2019 0.1  0.0 - 0.5 K/uL Final    Baso # 05/23/2019 0.03  0.00 - 0.20 K/uL Final    nRBC 05/23/2019 0  0 /100 WBC Final    Gran% 05/23/2019 72.1  38.0 - 73.0 % Final    Lymph% 05/23/2019 11.3* 18.0 - 48.0 % Final    Mono% 05/23/2019 12.3  4.0 - 15.0 % Final    Eosinophil% 05/23/2019 2.9  0.0 - 8.0 % Final    Basophil% 05/23/2019 0.7  0.0 - 1.9 % Final    Differential Method 05/23/2019 Automated   Final    Glucose 05/23/2019 210* 70 - 110 mg/dL Final    Sodium 05/23/2019 140  136 - 145 mmol/L Final    Potassium 05/23/2019 5.0  3.5 - 5.1 mmol/L Final    Chloride 05/23/2019 103  95 - 110 mmol/L Final    CO2 05/23/2019 26  23 - 29 mmol/L Final    BUN, Bld 05/23/2019 19  8 - 23 mg/dL Final    Calcium 05/23/2019 10.2  8.7 - 10.5 mg/dL Final    Creatinine 05/23/2019 1.2  0.5 - 1.4 mg/dL Final    Albumin 05/23/2019 3.7  3.5 - 5.2 g/dL Final    Phosphorus 05/23/2019 4.2  2.7 - 4.5 mg/dL Final    eGFR if  05/23/2019 56.4* >60 mL/min/1.73 m^2 Final    eGFR if non African American 05/23/2019 48.9* >60 mL/min/1.73 m^2 Final    Anion Gap 05/23/2019 11  8 - 16 mmol/L Final    Magnesium 05/23/2019 1.6  1.6 - 2.6 mg/dL Final   Lab Visit on 05/20/2019   Component Date Value Ref Range Status    Tacrolimus Lvl 05/20/2019 4.3* 5.0 - 15.0 ng/mL Final    WBC 05/20/2019 3.40* 3.90 - 12.70 K/uL Final    RBC 05/20/2019 3.42* 4.00 - 5.40 M/uL Final    Hemoglobin 05/20/2019 10.5* 12.0 - 16.0 g/dL Final    Hematocrit 05/20/2019 33.1* 37.0 - 48.5 % Final     Mean Corpuscular Volume 05/20/2019 97  82 - 98 fL Final    Mean Corpuscular Hemoglobin 05/20/2019 30.7  27.0 - 31.0 pg Final    Mean Corpuscular Hemoglobin Conc 05/20/2019 31.7* 32.0 - 36.0 g/dL Final    RDW 05/20/2019 17.4* 11.5 - 14.5 % Final    Platelets 05/20/2019 230  150 - 350 K/uL Final    MPV 05/20/2019 10.1  9.2 - 12.9 fL Final    Immature Granulocytes 05/20/2019 0.6* 0.0 - 0.5 % Final    Gran # (ANC) 05/20/2019 2.4  1.8 - 7.7 K/uL Final    Immature Grans (Abs) 05/20/2019 0.02  0.00 - 0.04 K/uL Final    Lymph # 05/20/2019 0.4* 1.0 - 4.8 K/uL Final    Mono # 05/20/2019 0.5  0.3 - 1.0 K/uL Final    Eos # 05/20/2019 0.1  0.0 - 0.5 K/uL Final    Baso # 05/20/2019 0.03  0.00 - 0.20 K/uL Final    nRBC 05/20/2019 0  0 /100 WBC Final    Gran% 05/20/2019 69.4  38.0 - 73.0 % Final    Lymph% 05/20/2019 11.5* 18.0 - 48.0 % Final    Mono% 05/20/2019 13.5  4.0 - 15.0 % Final    Eosinophil% 05/20/2019 4.1  0.0 - 8.0 % Final    Basophil% 05/20/2019 0.9  0.0 - 1.9 % Final    Differential Method 05/20/2019 Automated   Final    Glucose 05/20/2019 202* 70 - 110 mg/dL Final    Sodium 05/20/2019 140  136 - 145 mmol/L Final    Potassium 05/20/2019 4.6  3.5 - 5.1 mmol/L Final    Chloride 05/20/2019 105  95 - 110 mmol/L Final    CO2 05/20/2019 24  23 - 29 mmol/L Final    BUN, Bld 05/20/2019 16  8 - 23 mg/dL Final    Calcium 05/20/2019 10.2  8.7 - 10.5 mg/dL Final    Creatinine 05/20/2019 1.2  0.5 - 1.4 mg/dL Final    Albumin 05/20/2019 3.6  3.5 - 5.2 g/dL Final    Phosphorus 05/20/2019 3.8  2.7 - 4.5 mg/dL Final    eGFR if  05/20/2019 56.4* >60 mL/min/1.73 m^2 Final    eGFR if non African American 05/20/2019 48.9* >60 mL/min/1.73 m^2 Final    Anion Gap 05/20/2019 11  8 - 16 mmol/L Final    Magnesium 05/20/2019 1.6  1.6 - 2.6 mg/dL Final    Cytomegalovirus PCR, Quant 05/20/2019 Undetected  Undetected IU/mL Final    Parvovirus B19 DNA, Quant 05/20/2019 [id number]* <100 Copies/mL Final    Lab Visit on 05/13/2019   Component Date Value Ref Range Status    Tacrolimus Lvl 05/13/2019 5.1  5.0 - 15.0 ng/mL Final    WBC 05/13/2019 3.06* 3.90 - 12.70 K/uL Final    RBC 05/13/2019 3.09* 4.00 - 5.40 M/uL Final    Hemoglobin 05/13/2019 9.2* 12.0 - 16.0 g/dL Final    Hematocrit 05/13/2019 28.3* 37.0 - 48.5 % Final    Mean Corpuscular Volume 05/13/2019 92  82 - 98 fL Final    Mean Corpuscular Hemoglobin 05/13/2019 29.8  27.0 - 31.0 pg Final    Mean Corpuscular Hemoglobin Conc 05/13/2019 32.5  32.0 - 36.0 g/dL Final    RDW 05/13/2019 15.7* 11.5 - 14.5 % Final    Platelets 05/13/2019 188  150 - 350 K/uL Final    MPV 05/13/2019 10.5  9.2 - 12.9 fL Final    Immature Granulocytes 05/13/2019 4.9* 0.0 - 0.5 % Final    Gran # (ANC) 05/13/2019 1.8  1.8 - 7.7 K/uL Final    Immature Grans (Abs) 05/13/2019 0.15* 0.00 - 0.04 K/uL Final    Lymph # 05/13/2019 0.5* 1.0 - 4.8 K/uL Final    Mono # 05/13/2019 0.5  0.3 - 1.0 K/uL Final    Eos # 05/13/2019 0.1  0.0 - 0.5 K/uL Final    Baso # 05/13/2019 0.02  0.00 - 0.20 K/uL Final    nRBC 05/13/2019 1* 0 /100 WBC Final    Gran% 05/13/2019 59.5  38.0 - 73.0 % Final    Lymph% 05/13/2019 16.3* 18.0 - 48.0 % Final    Mono% 05/13/2019 15.0  4.0 - 15.0 % Final    Eosinophil% 05/13/2019 3.6  0.0 - 8.0 % Final    Basophil% 05/13/2019 0.7  0.0 - 1.9 % Final    Platelet Estimate 05/13/2019 Appears normal   Final    Aniso 05/13/2019 Slight   Final    Poik 05/13/2019 Slight   Final    Poly 05/13/2019 Occasional   Final    Hypo 05/13/2019 Occasional   Final    Ovalocytes 05/13/2019 Occasional   Final    Differential Method 05/13/2019 Automated   Final    Glucose 05/13/2019 177* 70 - 110 mg/dL Final    Sodium 05/13/2019 140  136 - 145 mmol/L Final    Potassium 05/13/2019 4.3  3.5 - 5.1 mmol/L Final    Chloride 05/13/2019 109  95 - 110 mmol/L Final    CO2 05/13/2019 23  23 - 29 mmol/L Final    BUN, Bld 05/13/2019 22  8 - 23 mg/dL Final    Calcium 05/13/2019  9.4  8.7 - 10.5 mg/dL Final    Creatinine 05/13/2019 1.2  0.5 - 1.4 mg/dL Final    Albumin 05/13/2019 3.4* 3.5 - 5.2 g/dL Final    Phosphorus 05/13/2019 3.6  2.7 - 4.5 mg/dL Final    eGFR if  05/13/2019 56.4* >60 mL/min/1.73 m^2 Final    eGFR if non African American 05/13/2019 48.9* >60 mL/min/1.73 m^2 Final    Anion Gap 05/13/2019 8  8 - 16 mmol/L Final    Magnesium 05/13/2019 1.5* 1.6 - 2.6 mg/dL Final    BK Virus DNA, Blood 05/13/2019 Not detected  Not detected Final    BK Virus DNA PCR, Quant, Blood 05/13/2019 <125  <125 Copies/mL Final    Log BKV Copies/mL 05/13/2019 <2.10  <2.10 Log (10) Copies/mL Final    Cytomegalovirus PCR, Quant 05/13/2019 Undetected  Undetected IU/mL Final   Admission on 05/07/2019, Discharged on 05/10/2019   No results displayed because visit has over 200 results.      Admission on 05/06/2019, Discharged on 05/06/2019   Component Date Value Ref Range Status    Blood Culture, Routine 05/06/2019 No growth after 5 days.   Final    Blood Culture, Routine 05/06/2019 No growth after 5 days.   Final    WBC 05/06/2019 5.20  3.90 - 12.70 K/uL Final    RBC 05/06/2019 2.39* 4.00 - 5.40 M/uL Final    Hemoglobin 05/06/2019 6.9* 12.0 - 16.0 g/dL Final    Hematocrit 05/06/2019 21.0* 37.0 - 48.5 % Final    Mean Corpuscular Volume 05/06/2019 88  82 - 98 fL Final    Mean Corpuscular Hemoglobin 05/06/2019 28.9  27.0 - 31.0 pg Final    Mean Corpuscular Hemoglobin Conc 05/06/2019 32.9  32.0 - 36.0 g/dL Final    RDW 05/06/2019 18.2* 11.5 - 14.5 % Final    Platelets 05/06/2019 179  150 - 350 K/uL Final    MPV 05/06/2019 7.8* 9.2 - 12.9 fL Final    Gran # (ANC) 05/06/2019 4.6  1.8 - 7.7 K/uL Final    Lymph # 05/06/2019 0.2* 1.0 - 4.8 K/uL Final    Mono # 05/06/2019 0.3  0.3 - 1.0 K/uL Final    Eos # 05/06/2019 0.1  0.0 - 0.5 K/uL Final    Baso # 05/06/2019 0.00  0.00 - 0.20 K/uL Final    Gran% 05/06/2019 89.3* 38.0 - 73.0 % Final    Lymph% 05/06/2019 3.0* 18.0 -  48.0 % Final    Mono% 05/06/2019 5.4  4.0 - 15.0 % Final    Eosinophil% 05/06/2019 2.2  0.0 - 8.0 % Final    Basophil% 05/06/2019 0.1  0.0 - 1.9 % Final    Differential Method 05/06/2019 Automated   Final    Sodium 05/06/2019 138  136 - 145 mmol/L Final    Potassium 05/06/2019 4.2  3.5 - 5.1 mmol/L Final    Chloride 05/06/2019 106  95 - 110 mmol/L Final    CO2 05/06/2019 21* 23 - 29 mmol/L Final    Glucose 05/06/2019 187* 70 - 110 mg/dL Final    BUN, Bld 05/06/2019 13  8 - 23 mg/dL Final    Creatinine 05/06/2019 1.3  0.5 - 1.4 mg/dL Final    Calcium 05/06/2019 9.0  8.7 - 10.5 mg/dL Final    Total Protein 05/06/2019 6.6  6.0 - 8.4 g/dL Final    Albumin 05/06/2019 3.7  3.5 - 5.2 g/dL Final    Total Bilirubin 05/06/2019 0.6  0.1 - 1.0 mg/dL Final    Alkaline Phosphatase 05/06/2019 195* 55 - 135 U/L Final    AST 05/06/2019 60* 10 - 40 U/L Final    ALT 05/06/2019 60* 10 - 44 U/L Final    Anion Gap 05/06/2019 11  8 - 16 mmol/L Final    eGFR if  05/06/2019 51* >60 mL/min/1.73 m^2 Final    eGFR if non African American 05/06/2019 44* >60 mL/min/1.73 m^2 Final    Lactate (Lactic Acid) 05/06/2019 1.9  0.5 - 2.2 mmol/L Final    Specimen UA 05/06/2019 Urine, Clean Catch   Final    Color, UA 05/06/2019 Yellow  Yellow, Straw, Apryl Final    Appearance, UA 05/06/2019 Clear  Clear Final    pH, UA 05/06/2019 6.0  5.0 - 8.0 Final    Specific Gravity, UA 05/06/2019 <=1.005* 1.005 - 1.030 Final    Protein, UA 05/06/2019 Negative  Negative Final    Glucose, UA 05/06/2019 Negative  Negative Final    Ketones, UA 05/06/2019 Negative  Negative Final    Bilirubin (UA) 05/06/2019 Negative  Negative Final    Occult Blood UA 05/06/2019 Negative  Negative Final    Nitrite, UA 05/06/2019 Negative  Negative Final    Urobilinogen, UA 05/06/2019 Negative  <2.0 EU/dL Final    Leukocytes, UA 05/06/2019 Negative  Negative Final    Lactate (Lactic Acid) 05/06/2019 1.5  0.5 - 2.2 mmol/L Final    POCT  Glucose 05/06/2019 175* 70 - 110 mg/dL Final    POCT Glucose 05/06/2019 161* 70 - 110 mg/dL Final   Lab Visit on 04/29/2019   Component Date Value Ref Range Status    Tacrolimus Lvl 04/29/2019 7.8  5.0 - 15.0 ng/mL Final    WBC 04/29/2019 3.05* 3.90 - 12.70 K/uL Final    RBC 04/29/2019 2.73* 4.00 - 5.40 M/uL Final    Hemoglobin 04/29/2019 8.0* 12.0 - 16.0 g/dL Final    Hematocrit 04/29/2019 25.2* 37.0 - 48.5 % Final    Mean Corpuscular Volume 04/29/2019 92  82 - 98 fL Final    Mean Corpuscular Hemoglobin 04/29/2019 29.3  27.0 - 31.0 pg Final    Mean Corpuscular Hemoglobin Conc 04/29/2019 31.7* 32.0 - 36.0 g/dL Final    RDW 04/29/2019 15.9* 11.5 - 14.5 % Final    Platelets 04/29/2019 198  150 - 350 K/uL Final    MPV 04/29/2019 10.9  9.2 - 12.9 fL Final    Immature Granulocytes 04/29/2019 3.9* 0.0 - 0.5 % Final    Gran # (ANC) 04/29/2019 2.2  1.8 - 7.7 K/uL Final    Immature Grans (Abs) 04/29/2019 0.12* 0.00 - 0.04 K/uL Final    Lymph # 04/29/2019 0.4* 1.0 - 4.8 K/uL Final    Mono # 04/29/2019 0.3  0.3 - 1.0 K/uL Final    Eos # 04/29/2019 0.1  0.0 - 0.5 K/uL Final    Baso # 04/29/2019 0.01  0.00 - 0.20 K/uL Final    nRBC 04/29/2019 0  0 /100 WBC Final    Gran% 04/29/2019 71.5  38.0 - 73.0 % Final    Lymph% 04/29/2019 12.1* 18.0 - 48.0 % Final    Mono% 04/29/2019 10.2  4.0 - 15.0 % Final    Eosinophil% 04/29/2019 2.0  0.0 - 8.0 % Final    Basophil% 04/29/2019 0.3  0.0 - 1.9 % Final    Differential Method 04/29/2019 Automated   Final    Glucose 04/29/2019 178* 70 - 110 mg/dL Final    Sodium 04/29/2019 145  136 - 145 mmol/L Final    Potassium 04/29/2019 4.3  3.5 - 5.1 mmol/L Final    Chloride 04/29/2019 112* 95 - 110 mmol/L Final    CO2 04/29/2019 24  23 - 29 mmol/L Final    BUN, Bld 04/29/2019 14  8 - 23 mg/dL Final    Calcium 04/29/2019 9.3  8.7 - 10.5 mg/dL Final    Creatinine 04/29/2019 1.1  0.5 - 1.4 mg/dL Final    Albumin 04/29/2019 3.5  3.5 - 5.2 g/dL Final    Phosphorus  04/29/2019 3.7  2.7 - 4.5 mg/dL Final    eGFR if African American 04/29/2019 >60.0  >60 mL/min/1.73 m^2 Final    eGFR if non African American 04/29/2019 54.3* >60 mL/min/1.73 m^2 Final    Anion Gap 04/29/2019 9  8 - 16 mmol/L Final    Magnesium 04/29/2019 1.5* 1.6 - 2.6 mg/dL Final    Cytomegalovirus PCR, Quant 04/29/2019 Undetected  Undetected IU/mL Final   Admission on 04/27/2019, Discharged on 04/28/2019   Component Date Value Ref Range Status    Blood Culture, Routine 04/27/2019 No growth after 5 days.   Final    Blood Culture, Routine 04/27/2019 No growth after 5 days.   Final    WBC 04/27/2019 4.40  3.90 - 12.70 K/uL Final    RBC 04/27/2019 2.63* 4.00 - 5.40 M/uL Final    Hemoglobin 04/27/2019 7.5* 12.0 - 16.0 g/dL Final    Hematocrit 04/27/2019 22.9* 37.0 - 48.5 % Final    Mean Corpuscular Volume 04/27/2019 87  82 - 98 fL Final    Mean Corpuscular Hemoglobin 04/27/2019 28.6  27.0 - 31.0 pg Final    Mean Corpuscular Hemoglobin Conc 04/27/2019 32.8  32.0 - 36.0 g/dL Final    RDW 04/27/2019 17.7* 11.5 - 14.5 % Final    Platelets 04/27/2019 189  150 - 350 K/uL Final    MPV 04/27/2019 7.9* 9.2 - 12.9 fL Final    Gran # (ANC) 04/27/2019 3.5  1.8 - 7.7 K/uL Final    Lymph # 04/27/2019 0.3* 1.0 - 4.8 K/uL Final    Mono # 04/27/2019 0.5  0.3 - 1.0 K/uL Final    Eos # 04/27/2019 0.1  0.0 - 0.5 K/uL Final    Baso # 04/27/2019 0.00  0.00 - 0.20 K/uL Final    Gran% 04/27/2019 80.0* 38.0 - 73.0 % Final    Lymph% 04/27/2019 7.0* 18.0 - 48.0 % Final    Mono% 04/27/2019 11.0  4.0 - 15.0 % Final    Eosinophil% 04/27/2019 1.8  0.0 - 8.0 % Final    Basophil% 04/27/2019 0.2  0.0 - 1.9 % Final    Differential Method 04/27/2019 Automated   Final    Sodium 04/27/2019 141  136 - 145 mmol/L Final    Potassium 04/27/2019 4.2  3.5 - 5.1 mmol/L Final    Chloride 04/27/2019 109  95 - 110 mmol/L Final    CO2 04/27/2019 23  23 - 29 mmol/L Final    Glucose 04/27/2019 185* 70 - 110 mg/dL Final    BUN, Bld  04/27/2019 13  8 - 23 mg/dL Final    Creatinine 04/27/2019 1.3  0.5 - 1.4 mg/dL Final    Calcium 04/27/2019 8.8  8.7 - 10.5 mg/dL Final    Total Protein 04/27/2019 6.7  6.0 - 8.4 g/dL Final    Albumin 04/27/2019 3.6  3.5 - 5.2 g/dL Final    Total Bilirubin 04/27/2019 0.5  0.1 - 1.0 mg/dL Final    Alkaline Phosphatase 04/27/2019 159* 55 - 135 U/L Final    AST 04/27/2019 31  10 - 40 U/L Final    ALT 04/27/2019 33  10 - 44 U/L Final    Anion Gap 04/27/2019 9  8 - 16 mmol/L Final    eGFR if  04/27/2019 51* >60 mL/min/1.73 m^2 Final    eGFR if non African American 04/27/2019 44* >60 mL/min/1.73 m^2 Final    Lactate (Lactic Acid) 04/27/2019 1.4  0.5 - 2.2 mmol/L Final    Specimen UA 04/27/2019 Urine, Clean Catch   Final    Color, UA 04/27/2019 Yellow  Yellow, Straw, Apryl Final    Appearance, UA 04/27/2019 Clear  Clear Final    pH, UA 04/27/2019 6.0  5.0 - 8.0 Final    Specific Gravity, UA 04/27/2019 1.020  1.005 - 1.030 Final    Protein, UA 04/27/2019 Trace* Negative Final    Glucose, UA 04/27/2019 Negative  Negative Final    Ketones, UA 04/27/2019 Negative  Negative Final    Bilirubin (UA) 04/27/2019 Negative  Negative Final    Occult Blood UA 04/27/2019 Negative  Negative Final    Nitrite, UA 04/27/2019 Negative  Negative Final    Urobilinogen, UA 04/27/2019 Negative  <2.0 EU/dL Final    Leukocytes, UA 04/27/2019 Negative  Negative Final    Influenza A, Molecular 04/27/2019 Negative  Negative Final    Influenza B, Molecular 04/27/2019 Negative  Negative Final    Flu A & B Source 04/27/2019 Nasal swab   Final    Procalcitonin 04/27/2019 0.07  <0.25 ng/mL Final    Tacrolimus Lvl 04/27/2019 5.9  5.0 - 15.0 ng/mL Final   There may be more visits with results that are not included.       Past Medical History:   Diagnosis Date    Anemia     Anemia in chronic kidney disease, on chronic dialysis 7/12/2017    Aplastic anemia with parvovirus B19 infection 5/27/2019    Arthritis      Asthma     allergic airway    CKD stage III (moderate) 2/18/2019    Colon polyp     Depression     Diabetes mellitus     Diverticulosis     Eosinophilia     ESRD (end stage renal disease)     stage V, due for living donor 9/2018    ESRD on dialysis     GERD (gastroesophageal reflux disease)     Gout     Gout     Hyperlipidemia     Hypertension     Hypertriglyceridemia without hypercholesterolemia 6/9/2019    Low back pain     Low HDL (under 40) 6/9/2019    MRSA carrier     Neutropenia, unspecified 5/8/2019    Obesity     Renal disorder     Rotator cuff syndrome--left     Thyroid disease     Ulnar neuropathy of right upper extremity     Uncontrolled type 2 diabetes mellitus with hyperglycemia      Past Surgical History:   Procedure Laterality Date    ACHILLES TENDON SURGERY Left May 2015    ARTHROSCOPY, HIP Left 10/3/2013    Performed by Moe Mel[facility]dez MD at [facility] OR    ARTHROSCOPY-HIP WITH TROCHANTERIC BURSECTOMY Left 10/3/2013    Performed by Moe [facility]dez MD at [facility] OR    ARTHROSCOPY-SHOULDER Left 11/24/2015    Performed by [facility]dez MD at [facility] OR    ARTHROSCOPY-SHOULDER WITH SUBACROMIAL DECOMPRESSION Left 7/12/2016    Performed by Moe Mel[facility]dez MD at [facility] OR    BACK SURGERY      CHOLECYSTECTOMY      COLONOSCOPY N/A 5/9/2019    Performed by [facility]ady [facility]wing [facility] at [facility] ENDO (2ND FLR)    COLONOSCOPY N/A 9/6/2017    Performed by [facility]isol [facility]son [facility] at [facility] ENDO    DEBRIDEMENT-SHOULDER-ARTHROSCOPIC Left 7/12/2016    Performed by [facility]dez MD at [facility] OR    DEBRIDEMENT-SHOULDER-ARTHROSCOPIC; LABRAL Left 11/24/2015    Performed by [facility]oe [facility]dez MD at [facility] OR    DIALYSIS FISTULA CREATION  12/2017    EGD (ESOPHAGOGASTRODUODENOSCOPY) N/A 5/9/2019    Performed by [facility]ady Ewing MD at [facility] ENDO (2ND FLR)    FEMOROPLASTY, ARTHROSCOPIC Left 10/3/2013    Performed by Moe Mel[facility]dez MD at [facility] OR    HEMORRHOID SURGERY      INJECTION-AMNIOX Left 7/12/2016    Performed  by [facility]dez [facility] at [facility] OR    JOINT REPLACEMENT      left    KNEE SURGERY      LYSIS-ADHESION Left 11/24/2015    Performed by [facility]dez [facility] at [facility] OR    REPAIR ROTATOR CUFF ARTHROSCOPIC Left 7/12/2016    Performed by [facility]dez [facility] at [facility] OR    REPAIR-TENDON-BICEP Left 11/24/2015    Performed by [facility]dez [facility] at [facility] OR    SHOULDER ARTHROSCOPY      SHOULDER SURGERY      TRANSPLANT, KIDNEY N/A 1/14/2019    Performed by [facility]azar [facility]    UPPER GASTROINTESTINAL ENDOSCOPY  ~2013    Dr. Moralez     Family History   Problem Relation Age of Onset    Diabetes Mother     Alzheimer's disease Mother     Thyroid disease Mother     Hyperlipidemia Mother     Heart disease Father     Stroke Father     Diabetes Sister     Lupus Sister     Ovarian cancer Sister     Heart disease Brother     No Known Problems Son     Diabetes Maternal Grandmother     Hypertension Maternal Grandmother     No Known Problems Paternal Grandmother     No Known Problems Paternal Grandfather     COPD Maternal Aunt     Diabetes Maternal Aunt     Heart disease Maternal Aunt     Hypertension Maternal Aunt     No Known Problems Maternal Uncle     No Known Problems Paternal Aunt     No Known Problems Paternal Uncle     Colon cancer Neg Hx     Colon polyps Neg Hx     Crohn's disease Neg Hx     Ulcerative colitis Neg Hx     Celiac disease Neg Hx        Marital Status: Significant Other  Alcohol History:  reports that she does not drink alcohol.  Tobacco History:  reports that she has never smoked. She has never used smokeless tobacco.  Drug History:  reports that she does not use drugs.    Review of patient's allergies indicates:   Allergen Reactions    Torsemide Diarrhea     Diarrhea stopped once discontinued med    Codeine Itching     Can take oxycodone and hydrocodone with Benadryl       Current Outpatient Medications:     biotin 10,000 mcg Cap, Take 10,000 mcg by mouth once  daily. , Disp: , Rfl:     ergocalciferol (ERGOCALCIFEROL) 50,000 unit Cap, TAKE ONE CAPSULE BY MOUTH EVERY SEVEN DAYS ON TUESDAY., Disp: 4 capsule, Rfl: 0    insulin (BASAGLAR KWIKPEN U-100 INSULIN) glargine 100 units/mL (3mL) SubQ pen, Inject 30 Units into the skin every evening., Disp: 15 mL, Rfl: 0    insulin aspart U-100 (NOVOLOG FLEXPEN U-100 INSULIN) 100 unit/mL (3 mL) InPn pen, Inject 6 Units into the skin 3 (three) times daily with meals. Plus sliding scale. Max TDD: 33 units, Disp: 15 mL, Rfl: 6    levothyroxine (SYNTHROID) 75 MCG tablet, TAKE ONE TABLET BY MOUTH ONCE DAILY, Disp: 30 tablet, Rfl: 4    LORazepam (ATIVAN) 2 MG Tab, Take 1 tablet (2 mg total) by mouth 2 (two) times daily., Disp: 60 tablet, Rfl: 1    magnesium oxide (MAG-OX) 400 mg (241.3 mg magnesium) tablet, Take 2 tablets (800 mg total) by mouth once daily., Disp: 60 tablet, Rfl: 11    multivitamin (THERAGRAN) tablet, Take 1 tablet by mouth once daily., Disp: , Rfl:     oxybutynin (DITROPAN-XL) 10 MG 24 hr tablet, Take 1 tablet (10 mg total) by mouth once daily., Disp: 30 tablet, Rfl: 11    pantoprazole (PROTONIX) 40 MG tablet, Take 1 tablet (40 mg total) by mouth once daily., Disp: 30 tablet, Rfl: 11    rosuvastatin (CRESTOR) 10 MG tablet, Take 1 tablet (10 mg total) by mouth once daily., Disp: 90 tablet, Rfl: 1    tacrolimus (PROGRAF) 0.5 MG Cap, Take 4 capsules (2 mg total) by mouth every morning AND 3 capsules (1.5 mg total) every evening., Disp: 210 capsule, Rfl: 11    vilazodone (VIIBRYD) 40 mg Tab tablet, Take 1 tablet (40 mg total) by mouth once daily., Disp: 30 tablet, Rfl: 4    Review of Systems   Constitutional: Negative for appetite change (no sugar), chills, diaphoresis, fatigue, fever and unexpected weight change (losing weight due to diet change).   HENT: Negative for congestion, ear pain (left ear remain congested), hearing loss, nosebleeds, postnasal drip, sinus pressure, sinus pain, sneezing, sore throat,  "tinnitus, trouble swallowing and voice change.    Eyes: Negative for photophobia, pain, itching and visual disturbance.   Respiratory: Negative for apnea, cough, chest tightness, shortness of breath, wheezing and stridor.    Cardiovascular: Negative for chest pain, palpitations and leg swelling.   Gastrointestinal: Negative for abdominal distention, abdominal pain (severe acid reflux changed from nexium to protonix which is not working), blood in stool, constipation, diarrhea (med), nausea and vomiting.   Endocrine: Negative for cold intolerance, heat intolerance, polydipsia and polyuria.   Genitourinary: Negative for difficulty urinating, dyspareunia, dysuria, flank pain, frequency, hematuria, menstrual problem, pelvic pain, urgency, vaginal discharge and vaginal pain.        Renal following   Musculoskeletal: Negative for arthralgias (generalized), back pain, joint swelling, myalgias, neck pain and neck stiffness.   Skin: Negative for pallor.   Allergic/Immunologic: Negative for environmental allergies and food allergies.   Neurological: Negative for dizziness, tremors, speech difficulty, weakness, light-headedness and numbness.        Getting up causes gait imbalance   Hematological: Does not bruise/bleed easily.   Psychiatric/Behavioral: Negative for agitation, confusion, decreased concentration, sleep disturbance and suicidal ideas. The patient is not nervous/anxious.           Objective:      Vitals:    06/10/19 0916 06/10/19 0950 06/10/19 0951   BP: 110/68 124/70 106/64   Pulse: 88 78 86   Resp: 12     Temp: 98.2 °F (36.8 °C)     SpO2: 97%     Weight: 90.3 kg (199 lb)     Height: 5' 7" (1.702 m)       Physical Exam   Constitutional: She is oriented to person, place, and time. She appears well-developed and well-nourished. She is cooperative. No distress.   HENT:   Head: Normocephalic and atraumatic.   Right Ear: Tympanic membrane and external ear normal.   Left Ear: Tympanic membrane normal.   Nose: Nose " normal.   Mouth/Throat: Uvula is midline and mucous membranes are normal.   Lots of dry wax almost occluding ear canal    Eyes: Pupils are equal, round, and reactive to light. Conjunctivae and EOM are normal. Right eye exhibits no discharge. Left eye exhibits no discharge. No scleral icterus. Right pupil is round and reactive. Left pupil is round and reactive.   Neck: Trachea normal and normal range of motion. Neck supple. No JVD present. Carotid bruit is not present. No thyromegaly present.   Cardiovascular: Normal rate, regular rhythm and intact distal pulses. Exam reveals no gallop and no friction rub.   No murmur (basilar murmur radiates to left neck) heard.  Pulmonary/Chest: Effort normal and breath sounds normal. No respiratory distress. She has no wheezes. She has no rales.   Abdominal: Soft. Bowel sounds are normal. She exhibits no distension and no mass. There is no tenderness. There is no guarding. No hernia.   Musculoskeletal: Normal range of motion. She exhibits no edema.   Neurological: She is alert and oriented to person, place, and time. She has normal strength.   Skin: Skin is warm and dry. Capillary refill takes less than 2 seconds. No lesion and no rash noted. No cyanosis. Nails show no clubbing.   Psychiatric: She has a normal mood and affect. Her speech is normal and behavior is normal. Judgment and thought content normal.   Nursing note and vitals reviewed.        Assessment:       1. Essential hypertension    2. Hypertriglyceridemia without hypercholesterolemia    3. Low HDL (under 40)    4. Uncontrolled type 2 diabetes mellitus, with long-term current use of insulin    5. Kidney transplant recipient    6. Status post living-donor kidney transplantation    7. Vitamin D deficiency    8. Anxiety    9. Reactive depression    10. BMI 35.0-35.9,adult    11. Orthostatic hypotension    12. Elevated ALT measurement    13. Excessive ear wax, left         Plan:       Essential hypertension        -      Lisinopril 10 mg        Hypertriglyceridemia without hypercholesterolemia        -     Per Endo    Low HDL (under 40)        -     Per Endo    Uncontrolled type 2 Diabetes, with long-term use of insulin        -     Per Endo    Kidney transplant recipient    Status post living-donor kidney transplantation    Vitamin D deficiency        -     Recheck level    Anxiety    Reactive depression        -     Refill viibryd    BMI 35.0-35.9,adult    Orthostatic Hypotension        -     Increase fluid intake     Elevated ALT measurement        -     Follow ALT    Excessive ear wax, left        -     Ear wax candling      Follow up in about 3 months (around 9/10/2019) for labs-status post transplant.        6/10/2019 Gita Cohen M.D.

## 2019-06-10 NOTE — TELEPHONE ENCOUNTER
I see where there was an on call message but no other messages on Thursday or Friday to staff. I left message for her to call but I see where notes say for on call   Trisha Van notified and advise pt via pc and advised pt to Increase Basaglar 35 units and 12 units with meals and s/s. Patient verbalized understanding     I will await return call for blood sugar today

## 2019-06-11 ENCOUNTER — TELEPHONE (OUTPATIENT)
Dept: TRANSPLANT | Facility: CLINIC | Age: 59
End: 2019-06-11

## 2019-06-11 LAB
B19V DNA # SPEC NAA+PROBE: ABNORMAL COPIES/ML
TACROLIMUS BLD-MCNC: 10.3 NG/ML (ref 5–15)

## 2019-06-11 NOTE — TELEPHONE ENCOUNTER
----- Message from Anahy Weiss MD sent at 6/11/2019  1:58 PM CDT -----  Prograf slightly higher than goal but labs drawn at 1123?? Query whether true 12 hour trough. Will see what next level is to determine need for dose adjustment - arrange for repeat labs in the next few days

## 2019-06-11 NOTE — TELEPHONE ENCOUNTER
Patient repeated back and voice a understanding of orders.  Patient had several episodes of loose watery stools over the weekend that now subsided.  Patient to increase PO fluids to over 2 liters daily and plan repeat Renal Panel on 6/13/19.

## 2019-06-11 NOTE — PROGRESS NOTES
Prograf slightly higher than goal but labs drawn at 1123?? Query whether true 12 hour trough. Will see what next level is to determine need for dose adjustment - arrange for repeat labs in the next few days

## 2019-06-11 NOTE — TELEPHONE ENCOUNTER
----- Message from Maureen Cabral MD sent at 6/10/2019 10:07 PM CDT -----  The following message sent to patient via [facility]: Your creatinine is up a bit. Please increase fluid intake and recheck renal panel in next few days.

## 2019-06-13 ENCOUNTER — LAB VISIT (OUTPATIENT)
Dept: LAB | Facility: HOSPITAL | Age: 59
End: 2019-06-13
Attending: INTERNAL MEDICINE

## 2019-06-13 ENCOUNTER — RESEARCH ENCOUNTER (OUTPATIENT)
Dept: RESEARCH | Facility: HOSPITAL | Age: 59
End: 2019-06-13
Payer: MEDICARE

## 2019-06-13 DIAGNOSIS — Z94.0 KIDNEY REPLACED BY TRANSPLANT: ICD-10-CM

## 2019-06-13 DIAGNOSIS — Z00.6 RESEARCH STUDY PATIENT: ICD-10-CM

## 2019-06-13 DIAGNOSIS — Z94.0 STATUS POST LIVING-DONOR KIDNEY TRANSPLANTATION: ICD-10-CM

## 2019-06-13 LAB
ALBUMIN SERPL BCP-MCNC: 3.9 G/DL (ref 3.5–5.2)
ANION GAP SERPL CALC-SCNC: 10 MMOL/L (ref 8–16)
BUN SERPL-MCNC: 23 MG/DL (ref 8–23)
CALCIUM SERPL-MCNC: 10.1 MG/DL (ref 8.7–10.5)
CHLORIDE SERPL-SCNC: 104 MMOL/L (ref 95–110)
CO2 SERPL-SCNC: 26 MMOL/L (ref 23–29)
CREAT SERPL-MCNC: 1.3 MG/DL (ref 0.5–1.4)
DRUG STUDY SPECIMEN TYPE: NORMAL
DRUG STUDY TEST NAME: NORMAL
DRUG STUDY TEST RESULT: NORMAL
EST. GFR  (AFRICAN AMERICAN): 51.2 ML/MIN/1.73 M^2
EST. GFR  (NON AFRICAN AMERICAN): 44.4 ML/MIN/1.73 M^2
GLUCOSE SERPL-MCNC: 229 MG/DL (ref 70–110)
MAGNESIUM SERPL-MCNC: 1.7 MG/DL (ref 1.6–2.6)
PHOSPHATE SERPL-MCNC: 3.7 MG/DL (ref 2.7–4.5)
POTASSIUM SERPL-SCNC: 4.7 MMOL/L (ref 3.5–5.1)
SODIUM SERPL-SCNC: 140 MMOL/L (ref 136–145)
TACROLIMUS BLD-MCNC: 9.4 NG/ML (ref 5–15)

## 2019-06-13 PROCEDURE — 36415 COLL VENOUS BLD VENIPUNCTURE: CPT

## 2019-06-13 PROCEDURE — 80197 ASSAY OF TACROLIMUS: CPT

## 2019-06-13 PROCEDURE — 80069 RENAL FUNCTION PANEL: CPT

## 2019-06-13 PROCEDURE — 83735 ASSAY OF MAGNESIUM: CPT

## 2019-06-13 PROCEDURE — 99000 SPECIMEN HANDLING OFFICE-LAB: CPT

## 2019-06-14 NOTE — PROGRESS NOTES
Protocol: [id number]  IRB# 2017.512  PI: Nicholas GUAN)  Site: [id number]  Subject #[id number]     VISIT 11 / WEEK 20:       Date of Visit: 13/JUN/2019     Patient arrived at clinic to complete his [id number] visit. Protocol Labs were drawn in morning at 09:11 AM. All specimens processed and shipped per protocol. Byron SARAVIA conducted visti in its entirety with Sub-I, Sharri Sands present. Patient agrees to continue his participation in the study and all questions answered.                                                                                                                                                                                                                                                                                                                                                    Changes in con-meds. Subject reports, no recent hospitalizations, but has noticed gets a little dizzy when gets up from a sitting position.      Vital signs obtained sitting:  BP: 111/77  HR: 82  RR: 16  Wt: 91.2 kg  T: 97.8 F oral     CMV Disease Assessment, Health Outcomes Assessment, Renal Transplant Outcomes, Health Outcomes Assessment and Child Pierre Score completed. Subject agreed to continue to follow compliance in regards to effective birth control as required by protocol.        Pill were in subject's pill box and returned. Drug returned:   MK-8228/Placebo -     6  VGCV/Placebo -         12  ACV/Placebo -            11    Study participant reminded to call  for any questions. Next visit in 1 month.

## 2019-06-17 ENCOUNTER — LAB VISIT (OUTPATIENT)
Dept: LAB | Facility: HOSPITAL | Age: 59
End: 2019-06-17
Attending: INTERNAL MEDICINE
Payer: MEDICARE

## 2019-06-17 DIAGNOSIS — Z94.0 KIDNEY REPLACED BY TRANSPLANT: ICD-10-CM

## 2019-06-17 DIAGNOSIS — Z20.828 PARVOVIRUS EXPOSURE: ICD-10-CM

## 2019-06-17 LAB
ALBUMIN SERPL BCP-MCNC: 3.7 G/DL (ref 3.5–5.2)
ANION GAP SERPL CALC-SCNC: 11 MMOL/L (ref 8–16)
BASOPHILS # BLD AUTO: 0.04 K/UL (ref 0–0.2)
BASOPHILS NFR BLD: 0.6 % (ref 0–1.9)
BUN SERPL-MCNC: 19 MG/DL (ref 8–23)
CALCIUM SERPL-MCNC: 9.7 MG/DL (ref 8.7–10.5)
CHLORIDE SERPL-SCNC: 103 MMOL/L (ref 95–110)
CO2 SERPL-SCNC: 24 MMOL/L (ref 23–29)
CREAT SERPL-MCNC: 1.2 MG/DL (ref 0.5–1.4)
DIFFERENTIAL METHOD: ABNORMAL
EOSINOPHIL # BLD AUTO: 0.3 K/UL (ref 0–0.5)
EOSINOPHIL NFR BLD: 4.2 % (ref 0–8)
ERYTHROCYTE [DISTWIDTH] IN BLOOD BY AUTOMATED COUNT: 15.4 % (ref 11.5–14.5)
EST. GFR  (AFRICAN AMERICAN): 56.4 ML/MIN/1.73 M^2
EST. GFR  (NON AFRICAN AMERICAN): 48.9 ML/MIN/1.73 M^2
GLUCOSE SERPL-MCNC: 234 MG/DL (ref 70–110)
HCT VFR BLD AUTO: 37.7 % (ref 37–48.5)
HGB BLD-MCNC: 11.9 G/DL (ref 12–16)
IMM GRANULOCYTES # BLD AUTO: 0.02 K/UL (ref 0–0.04)
IMM GRANULOCYTES NFR BLD AUTO: 0.3 % (ref 0–0.5)
LYMPHOCYTES # BLD AUTO: 0.6 K/UL (ref 1–4.8)
LYMPHOCYTES NFR BLD: 8.8 % (ref 18–48)
MAGNESIUM SERPL-MCNC: 1.5 MG/DL (ref 1.6–2.6)
MCH RBC QN AUTO: 31.3 PG (ref 27–31)
MCHC RBC AUTO-ENTMCNC: 31.6 G/DL (ref 32–36)
MCV RBC AUTO: 99 FL (ref 82–98)
MONOCYTES # BLD AUTO: 0.4 K/UL (ref 0.3–1)
MONOCYTES NFR BLD: 5.9 % (ref 4–15)
NEUTROPHILS # BLD AUTO: 5.2 K/UL (ref 1.8–7.7)
NEUTROPHILS NFR BLD: 80.2 % (ref 38–73)
NRBC BLD-RTO: 0 /100 WBC
PHOSPHATE SERPL-MCNC: 4 MG/DL (ref 2.7–4.5)
PLATELET # BLD AUTO: 191 K/UL (ref 150–350)
PMV BLD AUTO: 10.3 FL (ref 9.2–12.9)
POTASSIUM SERPL-SCNC: 4.5 MMOL/L (ref 3.5–5.1)
RBC # BLD AUTO: 3.8 M/UL (ref 4–5.4)
SODIUM SERPL-SCNC: 138 MMOL/L (ref 136–145)
WBC # BLD AUTO: 6.45 K/UL (ref 3.9–12.7)

## 2019-06-17 PROCEDURE — 83735 ASSAY OF MAGNESIUM: CPT

## 2019-06-17 PROCEDURE — 36415 COLL VENOUS BLD VENIPUNCTURE: CPT | Mod: PO

## 2019-06-17 PROCEDURE — 80197 ASSAY OF TACROLIMUS: CPT

## 2019-06-17 PROCEDURE — 85025 COMPLETE CBC W/AUTO DIFF WBC: CPT

## 2019-06-17 PROCEDURE — 86352 CELL FUNCTION ASSAY W/STIM: CPT

## 2019-06-17 PROCEDURE — 87799 DETECT AGENT NOS DNA QUANT: CPT

## 2019-06-17 PROCEDURE — 80069 RENAL FUNCTION PANEL: CPT

## 2019-06-18 ENCOUNTER — TELEPHONE (OUTPATIENT)
Dept: HEMATOLOGY/ONCOLOGY | Facility: CLINIC | Age: 59
End: 2019-06-18

## 2019-06-18 LAB — TACROLIMUS BLD-MCNC: 7.9 NG/ML (ref 5–15)

## 2019-06-19 ENCOUNTER — TELEPHONE (OUTPATIENT)
Dept: TRANSPLANT | Facility: CLINIC | Age: 59
End: 2019-06-19

## 2019-06-19 ENCOUNTER — PATIENT MESSAGE (OUTPATIENT)
Dept: TRANSPLANT | Facility: CLINIC | Age: 59
End: 2019-06-19

## 2019-06-19 DIAGNOSIS — E03.9 ACQUIRED HYPOTHYROIDISM: ICD-10-CM

## 2019-06-19 LAB
ALLOSURE: NORMAL
IMMUNKNOW (STIMULATED): 247 NG/ML (ref 226–524)

## 2019-06-19 RX ORDER — LEVOTHYROXINE SODIUM 75 UG/1
75 TABLET ORAL DAILY
Qty: 30 TABLET | Refills: 4 | Status: SHIPPED | OUTPATIENT
Start: 2019-06-19 | End: 2019-11-18 | Stop reason: SDUPTHER

## 2019-06-19 NOTE — TELEPHONE ENCOUNTER
Spoke with MARCELA Joyner RN in ID infusion and appointment reschedule to 6/24 per patient request.  Patient notified and all questions answered.

## 2019-06-19 NOTE — TELEPHONE ENCOUNTER
----- Message from Jade Parker RN sent at 6/19/2019  9:43 AM CDT -----  Patient was sent to Tx clinic from ID infusion without getting Pentam. Inhalation. Was told there were no orders. Therapy plan in place. I tried calling all the numbers for Infusion suite, no answer. Patient did not want to wait to get it resolved. She said she has an appointment here for ID labs on Monday. Can we please reschedule her for Monday?  Thanks,  Jade

## 2019-06-21 LAB — B19V DNA # SPEC NAA+PROBE: 3723 COPIES/ML

## 2019-06-24 ENCOUNTER — LAB VISIT (OUTPATIENT)
Dept: LAB | Facility: HOSPITAL | Age: 59
End: 2019-06-24
Attending: INTERNAL MEDICINE
Payer: MEDICARE

## 2019-06-24 ENCOUNTER — PATIENT MESSAGE (OUTPATIENT)
Dept: ENDOCRINOLOGY | Facility: CLINIC | Age: 59
End: 2019-06-24

## 2019-06-24 ENCOUNTER — CLINICAL SUPPORT (OUTPATIENT)
Dept: INFECTIOUS DISEASES | Facility: CLINIC | Age: 59
End: 2019-06-24
Payer: MEDICARE

## 2019-06-24 DIAGNOSIS — Z94.0 KIDNEY REPLACED BY TRANSPLANT: ICD-10-CM

## 2019-06-24 DIAGNOSIS — D63.8 ANEMIA OF CHRONIC DISEASE: ICD-10-CM

## 2019-06-24 DIAGNOSIS — D64.9 ACUTE ON CHRONIC ANEMIA: Primary | ICD-10-CM

## 2019-06-24 LAB
ALBUMIN SERPL BCP-MCNC: 3.8 G/DL (ref 3.5–5.2)
ANION GAP SERPL CALC-SCNC: 9 MMOL/L (ref 8–16)
BASOPHILS # BLD AUTO: 0.03 K/UL (ref 0–0.2)
BASOPHILS NFR BLD: 0.4 % (ref 0–1.9)
BUN SERPL-MCNC: 20 MG/DL (ref 8–23)
CALCIUM SERPL-MCNC: 10.1 MG/DL (ref 8.7–10.5)
CHLORIDE SERPL-SCNC: 102 MMOL/L (ref 95–110)
CO2 SERPL-SCNC: 29 MMOL/L (ref 23–29)
CREAT SERPL-MCNC: 1.2 MG/DL (ref 0.5–1.4)
DIFFERENTIAL METHOD: ABNORMAL
EOSINOPHIL # BLD AUTO: 0.3 K/UL (ref 0–0.5)
EOSINOPHIL NFR BLD: 3.7 % (ref 0–8)
ERYTHROCYTE [DISTWIDTH] IN BLOOD BY AUTOMATED COUNT: 14.3 % (ref 11.5–14.5)
EST. GFR  (AFRICAN AMERICAN): 56.4 ML/MIN/1.73 M^2
EST. GFR  (NON AFRICAN AMERICAN): 48.9 ML/MIN/1.73 M^2
GLUCOSE SERPL-MCNC: 243 MG/DL (ref 70–110)
HCT VFR BLD AUTO: 37 % (ref 37–48.5)
HGB BLD-MCNC: 12.2 G/DL (ref 12–16)
IMM GRANULOCYTES # BLD AUTO: 0.04 K/UL (ref 0–0.04)
IMM GRANULOCYTES NFR BLD AUTO: 0.5 % (ref 0–0.5)
LYMPHOCYTES # BLD AUTO: 0.6 K/UL (ref 1–4.8)
LYMPHOCYTES NFR BLD: 8.2 % (ref 18–48)
MAGNESIUM SERPL-MCNC: 1.4 MG/DL (ref 1.6–2.6)
MCH RBC QN AUTO: 31.4 PG (ref 27–31)
MCHC RBC AUTO-ENTMCNC: 33 G/DL (ref 32–36)
MCV RBC AUTO: 95 FL (ref 82–98)
MONOCYTES # BLD AUTO: 0.5 K/UL (ref 0.3–1)
MONOCYTES NFR BLD: 6.1 % (ref 4–15)
NEUTROPHILS # BLD AUTO: 6.1 K/UL (ref 1.8–7.7)
NEUTROPHILS NFR BLD: 81.1 % (ref 38–73)
NRBC BLD-RTO: 0 /100 WBC
PHOSPHATE SERPL-MCNC: 3.8 MG/DL (ref 2.7–4.5)
PLATELET # BLD AUTO: 166 K/UL (ref 150–350)
PMV BLD AUTO: 10.3 FL (ref 9.2–12.9)
POTASSIUM SERPL-SCNC: 4.4 MMOL/L (ref 3.5–5.1)
RBC # BLD AUTO: 3.89 M/UL (ref 4–5.4)
SODIUM SERPL-SCNC: 140 MMOL/L (ref 136–145)
TACROLIMUS BLD-MCNC: 8.9 NG/ML (ref 5–15)
WBC # BLD AUTO: 7.52 K/UL (ref 3.9–12.7)

## 2019-06-24 PROCEDURE — 80197 ASSAY OF TACROLIMUS: CPT

## 2019-06-24 PROCEDURE — 83735 ASSAY OF MAGNESIUM: CPT

## 2019-06-24 PROCEDURE — 80069 RENAL FUNCTION PANEL: CPT

## 2019-06-24 PROCEDURE — 94640 AIRWAY INHALATION TREATMENT: CPT | Mod: PBBFAC

## 2019-06-24 PROCEDURE — 36415 COLL VENOUS BLD VENIPUNCTURE: CPT

## 2019-06-24 PROCEDURE — 85025 COMPLETE CBC W/AUTO DIFF WBC: CPT

## 2019-06-24 RX ORDER — PENTAMIDINE ISETHIONATE 300 MG/300MG
300 INHALANT RESPIRATORY (INHALATION)
Status: COMPLETED | OUTPATIENT
Start: 2019-06-24 | End: 2019-06-24

## 2019-06-24 RX ORDER — PENTAMIDINE ISETHIONATE 300 MG/300MG
300 INHALANT RESPIRATORY (INHALATION)
Status: CANCELLED | OUTPATIENT
Start: 2019-07-01

## 2019-06-24 RX ORDER — ALBUTEROL SULFATE 0.83 MG/ML
2.5 SOLUTION RESPIRATORY (INHALATION)
Status: COMPLETED | OUTPATIENT
Start: 2019-06-24 | End: 2019-06-24

## 2019-06-24 RX ORDER — TACROLIMUS 0.5 MG/1
CAPSULE ORAL
Qty: 210 CAPSULE | Refills: 11 | Status: SHIPPED | OUTPATIENT
Start: 2019-06-24 | End: 2019-07-03 | Stop reason: SDUPTHER

## 2019-06-24 RX ORDER — ALBUTEROL SULFATE 0.83 MG/ML
2.5 SOLUTION RESPIRATORY (INHALATION)
Status: CANCELLED | OUTPATIENT
Start: 2019-07-01

## 2019-06-24 RX ADMIN — PENTAMIDINE ISETHIONATE 300 MG: 300 INHALANT RESPIRATORY (INHALATION) at 10:06

## 2019-06-24 RX ADMIN — ALBUTEROL SULFATE 2.5 MG: 0.83 SOLUTION RESPIRATORY (INHALATION) at 10:06

## 2019-06-24 NOTE — PROGRESS NOTES
Ms. Newell received Albuterol inhalation treatment prior to receiving Pentamidine inhalation solution. Tolerated well. Left unit NAD.

## 2019-06-25 ENCOUNTER — PATIENT MESSAGE (OUTPATIENT)
Dept: ENDOCRINOLOGY | Facility: CLINIC | Age: 59
End: 2019-06-25

## 2019-06-27 NOTE — PROGRESS NOTES
"CC: This 59 y.o. female presents for management of diabetes  and chronic conditions pending review including HTN, HLP, ESRD s/p kidney txp 01/14/2019, hypothyroidism, vitamin d deficiency, obesity     HPI: She  was diagnosed with T2DM in 2005. Has never been hospitalized r/t DM.  Family hx of DM: grandmother     Bg continue to be uncontrolled despite frequent insulin titration   hypoglycemia at home- none    She was in the hospital with [facility] ~ 1 month ago- contracted from transplant   - off antibiotics for last few weeks    monitoring BG at home: 4 times a day  BG now out the 200s and into the 270-280 range    Diet: Eats 2-3  Meals a day, snacks- not often- appetite decreased since txp  May skip breakfast and have "brunch" bc she wakes up late  May have a small bag of chip in the afternoon  Exercise: Starting [employer] July 1 st- needs left hip replacement,   CURRENT DM MEDS: basaglar 40 u qhs, Novolog 20 u AC + correction   Vial/pen:  Uses pens  Glucometer type:  Relion    Standards of Care:  Eye exam: 6/2018, No DR, [facility]    ROS:   Gen: Appetite good, +weight loss 10 lbs, + fatigue   Skin: Skin is intact and heals well, no rashes, no hair changes  Eyes: Denies visual disturbances  Resp: BOYER, no cough  Cardiac: No palpitations, chest pain, no edema   GI: No nausea or vomiting, diarrhea, constipation, or abdominal pain.  /GYN: 1-2+ nocturia, burning or pain.   MS/Neuro: Denies numbness/ tingling in BLE; Gait steady, speech clear  Psych: Denies drug/ETOH abuse + depression.  Other systems: negative.    PE:  GENERAL: Well developed, well nourished.  PSYCH: AAOx3, appropriate mood and affect, pleasant expression, conversant, appears relaxed, well groomed.   EYES: Conjunctiva, corneas clear    NECK: Supple, trachea midline  ABDOMEN: Soft, non-tender, non-distended   VASCULAR: DP pulses +2/4 bilaterally, no edema.  NEURO: Gait steady  SKIN: Skin warm and dry; no acanthosis nigracans.     Lab Results "   Component Value Date    MICALBCREAT 1297.8 (H) 07/02/2018       Hemoglobin A1C   Date Value Ref Range Status   05/27/2019 7.1 (H) 4.0 - 5.6 % Final     Comment:     ADA Screening Guidelines:  5.7-6.4%  Consistent with prediabetes  >or=6.5%  Consistent with diabetes  High levels of fetal hemoglobin interfere with the HbA1C  assay. Heterozygous hemoglobin variants (HbS, HgC, etc)do  not significantly interfere with this assay.   However, presence of multiple variants may affect accuracy.     05/07/2019 8.9 (H) 4.0 - 5.6 % Final     Comment:     ADA Screening Guidelines:  5.7-6.4%  Consistent with prediabetes  >or=6.5%  Consistent with diabetes  High levels of fetal hemoglobin interfere with the HbA1C  assay. Heterozygous hemoglobin variants (HbS, HgC, etc)do  not significantly interfere with this assay.   However, presence of multiple variants may affect accuracy.     05/07/2019 8.9 (H) 4.0 - 5.6 % Final     Comment:     ADA Screening Guidelines:  5.7-6.4%  Consistent with prediabetes  >or=6.5%  Consistent with diabetes  High levels of fetal hemoglobin interfere with the HbA1C  assay. Heterozygous hemoglobin variants (HbS, HgC, etc)do  not significantly interfere with this assay.   However, presence of multiple variants may affect accuracy.          ASSESSMENT and PLAN:    1. T2DM with hyperglycemia, CKD 3-  CGMS today  D/c basaglar  Start Tresiba u200 50 u qhs, Increase Novolog to 24 u AC + correction   Discussed A1c and BG goals.  Continue to test bg 4 times a day- log in 1 week for insulin titration or sooner for issues   Reviewed  hypoglycemia, s/s and appropriate tx.  RX for Continuous Glucose Monitor - order Dexcom when she drops off logs in 2 weeks  -Recommend Cody  Continuous Glucose monitor                         Pt tests glucoses a minimum of 4 x a day.                          Pt would benefit from therapeutic continuous glucose monitor to be able                         to make frequent insulin  adjustments, based on current glucoses.   - takes statin    2. HTN - controlled, continue meds as previously prescribed and monitor.     3. HLP - on statin therapy, LFTs WNL    4. S/p kidney txp- followed by [facility]    5.  Immunosuppression - agents may increase bg readings    6. Hypothyroidism - takes with all other meds     7. Vitamin d deficiency - continue ergo weekly     8. Obesity- Body mass index is 31.82 kg/m².   Continue weight loss       Follow-up: in 3 months with lab prior

## 2019-06-28 ENCOUNTER — PATIENT MESSAGE (OUTPATIENT)
Dept: TRANSPLANT | Facility: CLINIC | Age: 59
End: 2019-06-28

## 2019-06-28 ENCOUNTER — TELEPHONE (OUTPATIENT)
Dept: ENDOCRINOLOGY | Facility: CLINIC | Age: 59
End: 2019-06-28

## 2019-06-28 ENCOUNTER — CLINICAL SUPPORT (OUTPATIENT)
Dept: ENDOCRINOLOGY | Facility: CLINIC | Age: 59
End: 2019-06-28
Payer: MEDICARE

## 2019-06-28 ENCOUNTER — OFFICE VISIT (OUTPATIENT)
Dept: ENDOCRINOLOGY | Facility: CLINIC | Age: 59
End: 2019-06-28
Payer: MEDICARE

## 2019-06-28 VITALS
HEIGHT: 67 IN | DIASTOLIC BLOOD PRESSURE: 78 MMHG | WEIGHT: 203.13 LBS | RESPIRATION RATE: 18 BRPM | SYSTOLIC BLOOD PRESSURE: 122 MMHG | BODY MASS INDEX: 31.88 KG/M2 | HEART RATE: 76 BPM

## 2019-06-28 DIAGNOSIS — E78.2 MIXED HYPERLIPIDEMIA: Chronic | ICD-10-CM

## 2019-06-28 DIAGNOSIS — E03.9 ACQUIRED HYPOTHYROIDISM: ICD-10-CM

## 2019-06-28 DIAGNOSIS — I10 ESSENTIAL HYPERTENSION: ICD-10-CM

## 2019-06-28 DIAGNOSIS — E66.9 OBESITY (BMI 30-39.9): ICD-10-CM

## 2019-06-28 DIAGNOSIS — E55.9 VITAMIN D DEFICIENCY: ICD-10-CM

## 2019-06-28 PROCEDURE — 99214 OFFICE O/P EST MOD 30 MIN: CPT | Mod: S$PBB,,, | Performed by: NURSE PRACTITIONER

## 2019-06-28 PROCEDURE — 99999 PR PBB SHADOW E&M-EST. PATIENT-LVL III: CPT | Mod: PBBFAC,,, | Performed by: NURSE PRACTITIONER

## 2019-06-28 PROCEDURE — 99214 PR OFFICE/OUTPT VISIT, EST, LEVL IV, 30-39 MIN: ICD-10-PCS | Mod: S$PBB,,, | Performed by: NURSE PRACTITIONER

## 2019-06-28 PROCEDURE — 99213 OFFICE O/P EST LOW 20 MIN: CPT | Mod: PBBFAC,PO | Performed by: NURSE PRACTITIONER

## 2019-06-28 PROCEDURE — 99999 PR PBB SHADOW E&M-EST. PATIENT-LVL III: ICD-10-PCS | Mod: PBBFAC,,, | Performed by: NURSE PRACTITIONER

## 2019-06-28 PROCEDURE — 99499 UNLISTED E&M SERVICE: CPT | Mod: S$PBB,,, | Performed by: NURSE PRACTITIONER

## 2019-06-28 PROCEDURE — 99499 RISK ADDL DX/OHS AUDIT: ICD-10-PCS | Mod: S$PBB,,, | Performed by: NURSE PRACTITIONER

## 2019-06-28 RX ORDER — INSULIN DEGLUDEC 200 U/ML
50 INJECTION, SOLUTION SUBCUTANEOUS NIGHTLY
Qty: 18 ML | Refills: 12 | Status: SHIPPED | OUTPATIENT
Start: 2019-06-28 | End: 2019-07-12

## 2019-06-28 RX ORDER — INSULIN ASPART 100 [IU]/ML
INJECTION, SOLUTION INTRAVENOUS; SUBCUTANEOUS
Qty: 30 ML | Refills: 12 | Status: SHIPPED | OUTPATIENT
Start: 2019-06-28 | End: 2019-10-02

## 2019-06-28 RX ORDER — INSULIN DEGLUDEC 200 U/ML
INJECTION, SOLUTION SUBCUTANEOUS
Qty: 6 ML | Refills: 0 | COMMUNITY
Start: 2019-06-28 | End: 2019-07-12

## 2019-06-28 NOTE — PROGRESS NOTES
"DIABETES EDUCATOR NOTE   PLACEMENT OF FREESTYLE DAKOTAH PRO SENSOR  CONTINOUS GLUCOSE MONITORING SYSTEM (CGMS)    Patient is here in clinic today for placement of continuous glucose monitoring sensor.      Patient verified that they were here for CGMS procedure ordered by their provider and that they have a working glucose meter and supplies at home.   Patient provided with a Freestyle Dakotah Sensor and a copy of the Continuous Glucose Monitoring Patient Log to fill out during the study.   A detailed explanation of Continuous Glucose Monitoring was provided. Patient informed that this is a blind procedure and that they will not actually see the blood sugar tracing in real time.  Reviewed with patient the  patient education handout called "Your Freestyle Dakotah Pro sensor: What you need to know" to review self-care during the study to avoid sensor loosening or removal ie... bathing, swimming, dressing, and exercising.   Instructed patient to check blood sugar using home glucometer and to record the following on provided patient log sheets: Blood sugar taken at home, Meals and snacks, Activity, and Diabetes medications taken and dosage    Patient was brought to a private location.  Arm for insertion was selected and prepared and allowed to dry. Glucose Sensor Serial Number [id number]  was inserted to back of patient's Right upper arm.    The following forms were given and reviewed in detail with patient and all questions answered.   · Continuous Glucose Monitoring Patient Log #[id number]  · Freestyle Manufacture Patient Handout "Your Freestyle Dakotah Pro Sensor: What you need to know"     Instructions: Time: 15 min   Insertion of sensor done individually in private:  Time: 15 minutes           "

## 2019-06-28 NOTE — TELEPHONE ENCOUNTER
----- Message from Li Jay sent at 6/28/2019  3:23 PM CDT -----  Type:  Pharmacy Calling concerning an RX    Name of Caller:  Soniya  Pharmacy Name:  [facility]  Prescription Name:  Diabetic Test Strips  What do they need to clarify?: needs Diagnosis code on prescription  Best Call Back Number:  [phone] or fax to [phone]  Additional Information:

## 2019-07-01 ENCOUNTER — LAB VISIT (OUTPATIENT)
Dept: LAB | Facility: HOSPITAL | Age: 59
End: 2019-07-01
Attending: INTERNAL MEDICINE
Payer: MEDICARE

## 2019-07-01 DIAGNOSIS — Z94.0 KIDNEY REPLACED BY TRANSPLANT: ICD-10-CM

## 2019-07-01 DIAGNOSIS — Z20.828 PARVOVIRUS EXPOSURE: ICD-10-CM

## 2019-07-01 LAB
ALBUMIN SERPL BCP-MCNC: 3.9 G/DL (ref 3.5–5.2)
ANION GAP SERPL CALC-SCNC: 11 MMOL/L (ref 8–16)
BASOPHILS # BLD AUTO: 0.04 K/UL (ref 0–0.2)
BASOPHILS NFR BLD: 0.6 % (ref 0–1.9)
BUN SERPL-MCNC: 24 MG/DL (ref 8–23)
CALCIUM SERPL-MCNC: 10.3 MG/DL (ref 8.7–10.5)
CHLORIDE SERPL-SCNC: 104 MMOL/L (ref 95–110)
CO2 SERPL-SCNC: 27 MMOL/L (ref 23–29)
CREAT SERPL-MCNC: 1.4 MG/DL (ref 0.5–1.4)
DIFFERENTIAL METHOD: ABNORMAL
EOSINOPHIL # BLD AUTO: 0.3 K/UL (ref 0–0.5)
EOSINOPHIL NFR BLD: 4.7 % (ref 0–8)
ERYTHROCYTE [DISTWIDTH] IN BLOOD BY AUTOMATED COUNT: 13.8 % (ref 11.5–14.5)
EST. GFR  (AFRICAN AMERICAN): 46.8 ML/MIN/1.73 M^2
EST. GFR  (NON AFRICAN AMERICAN): 40.6 ML/MIN/1.73 M^2
GLUCOSE SERPL-MCNC: 209 MG/DL (ref 70–110)
HCT VFR BLD AUTO: 38.7 % (ref 37–48.5)
HGB BLD-MCNC: 12.4 G/DL (ref 12–16)
IMM GRANULOCYTES # BLD AUTO: 0.04 K/UL (ref 0–0.04)
IMM GRANULOCYTES NFR BLD AUTO: 0.6 % (ref 0–0.5)
LYMPHOCYTES # BLD AUTO: 0.7 K/UL (ref 1–4.8)
LYMPHOCYTES NFR BLD: 10 % (ref 18–48)
MAGNESIUM SERPL-MCNC: 1.6 MG/DL (ref 1.6–2.6)
MCH RBC QN AUTO: 31.4 PG (ref 27–31)
MCHC RBC AUTO-ENTMCNC: 32 G/DL (ref 32–36)
MCV RBC AUTO: 98 FL (ref 82–98)
MONOCYTES # BLD AUTO: 0.4 K/UL (ref 0.3–1)
MONOCYTES NFR BLD: 5.8 % (ref 4–15)
NEUTROPHILS # BLD AUTO: 5.2 K/UL (ref 1.8–7.7)
NEUTROPHILS NFR BLD: 78.3 % (ref 38–73)
NRBC BLD-RTO: 0 /100 WBC
PHOSPHATE SERPL-MCNC: 4.2 MG/DL (ref 2.7–4.5)
PLATELET # BLD AUTO: 202 K/UL (ref 150–350)
PMV BLD AUTO: 10.4 FL (ref 9.2–12.9)
POTASSIUM SERPL-SCNC: 4.6 MMOL/L (ref 3.5–5.1)
RBC # BLD AUTO: 3.95 M/UL (ref 4–5.4)
SODIUM SERPL-SCNC: 142 MMOL/L (ref 136–145)
WBC # BLD AUTO: 6.59 K/UL (ref 3.9–12.7)

## 2019-07-01 PROCEDURE — 87799 DETECT AGENT NOS DNA QUANT: CPT

## 2019-07-01 PROCEDURE — 36415 COLL VENOUS BLD VENIPUNCTURE: CPT | Mod: PO

## 2019-07-01 PROCEDURE — 85025 COMPLETE CBC W/AUTO DIFF WBC: CPT

## 2019-07-01 PROCEDURE — 80069 RENAL FUNCTION PANEL: CPT

## 2019-07-01 PROCEDURE — 83735 ASSAY OF MAGNESIUM: CPT

## 2019-07-01 PROCEDURE — 80197 ASSAY OF TACROLIMUS: CPT

## 2019-07-01 PROCEDURE — 86352 CELL FUNCTION ASSAY W/STIM: CPT

## 2019-07-01 RX ORDER — ERGOCALCIFEROL 1.25 MG/1
CAPSULE ORAL
Qty: 4 CAPSULE | Refills: 0 | Status: SHIPPED | OUTPATIENT
Start: 2019-07-01 | End: 2019-07-21 | Stop reason: SDUPTHER

## 2019-07-02 ENCOUNTER — PATIENT MESSAGE (OUTPATIENT)
Dept: TRANSPLANT | Facility: CLINIC | Age: 59
End: 2019-07-02

## 2019-07-02 LAB — TACROLIMUS BLD-MCNC: 8.5 NG/ML (ref 5–15)

## 2019-07-03 DIAGNOSIS — Z94.0 KIDNEY REPLACED BY TRANSPLANT: ICD-10-CM

## 2019-07-03 LAB — IMMUNKNOW (STIMULATED): 372 NG/ML (ref 226–524)

## 2019-07-03 RX ORDER — TACROLIMUS 1 MG/1
CAPSULE ORAL
Qty: 90 CAPSULE | Refills: 11 | Status: SHIPPED | OUTPATIENT
Start: 2019-07-03 | End: 2020-01-06 | Stop reason: SDUPTHER

## 2019-07-03 RX ORDER — TACROLIMUS 0.5 MG/1
0.5 CAPSULE ORAL NIGHTLY
Qty: 30 CAPSULE | Refills: 11 | Status: SHIPPED | OUTPATIENT
Start: 2019-07-03 | End: 2020-01-06 | Stop reason: SDUPTHER

## 2019-07-05 ENCOUNTER — TELEPHONE (OUTPATIENT)
Dept: TRANSPLANT | Facility: CLINIC | Age: 59
End: 2019-07-05

## 2019-07-05 DIAGNOSIS — Z94.0 KIDNEY REPLACED BY TRANSPLANT: Primary | ICD-10-CM

## 2019-07-05 LAB — B19V DNA # SPEC NAA+PROBE: 1472 COPIES/ML

## 2019-07-05 RX ORDER — SULFAMETHOXAZOLE AND TRIMETHOPRIM 400; 80 MG/1; MG/1
1 TABLET ORAL DAILY
Qty: 30 TABLET | Refills: 6 | Status: SHIPPED | OUTPATIENT
Start: 2019-07-05 | End: 2020-01-19

## 2019-07-05 NOTE — TELEPHONE ENCOUNTER
----- Message from Cailin Johns PharmD sent at 7/5/2019  1:20 PM CDT -----  Regarding: RE: Pentamidine  We should re-try Bactrim (I sent an Rx to her pharmacy), just monitor for hyperkalemia (last couple K+ have been 4.4-4.6).     Thanks,   Cailin Johns Pharm.D., BCPS  [id number]    ----- Message -----  From: Oracio Hendricks RN  Sent: 7/5/2019   1:07 PM  To: Jose D Jordan MD, #  Subject: RE: Pentamidine                                   Bactrim was D/C back in January due to hyperkalemia, Atovaquone D/C on 4/5 causes her Vomit and  Dapsone D/C during Hospital admit on 4/28.  Please advise.  ----- Message -----  From: Cailin Johns PharmD  Sent: 7/5/2019  11:54 AM  To: Jose D Jordan MD, #  Subject: Pentamidine                                      Hello,     This patient is due for a pentamidine inhalation on 7/17/19.  Dr. Cammie Kessler would like to us to try alternatives for this patient.      We can re-try bactrim on this patient (WBC is stable at 6, K remains stable in the 4.4-4.6 range).  No allergies noted.     If you are okay, please transition patient off of pentamidine and back to bactrim SS 1 tab PO daily (until 1/14/2020).     Thanks,   Cailin Johns Pharm.D., BCPS  [id number]

## 2019-07-08 ENCOUNTER — TELEPHONE (OUTPATIENT)
Dept: TRANSPLANT | Facility: CLINIC | Age: 59
End: 2019-07-08

## 2019-07-08 ENCOUNTER — LAB VISIT (OUTPATIENT)
Dept: LAB | Facility: HOSPITAL | Age: 59
End: 2019-07-08
Attending: INTERNAL MEDICINE
Payer: MEDICARE

## 2019-07-08 ENCOUNTER — RESEARCH ENCOUNTER (OUTPATIENT)
Dept: RESEARCH | Facility: HOSPITAL | Age: 59
End: 2019-07-08
Payer: MEDICARE

## 2019-07-08 DIAGNOSIS — Z94.0 KIDNEY REPLACED BY TRANSPLANT: ICD-10-CM

## 2019-07-08 DIAGNOSIS — Z00.6 RESEARCH STUDY PATIENT: ICD-10-CM

## 2019-07-08 DIAGNOSIS — Z94.0 STATUS POST LIVING-DONOR KIDNEY TRANSPLANTATION: ICD-10-CM

## 2019-07-08 LAB
ALBUMIN SERPL BCP-MCNC: 3.8 G/DL (ref 3.5–5.2)
ANION GAP SERPL CALC-SCNC: 13 MMOL/L (ref 8–16)
BASOPHILS # BLD AUTO: 0.05 K/UL (ref 0–0.2)
BASOPHILS NFR BLD: 0.8 % (ref 0–1.9)
BUN SERPL-MCNC: 17 MG/DL (ref 8–23)
CALCIUM SERPL-MCNC: 9.6 MG/DL (ref 8.7–10.5)
CHLORIDE SERPL-SCNC: 106 MMOL/L (ref 95–110)
CO2 SERPL-SCNC: 23 MMOL/L (ref 23–29)
CREAT SERPL-MCNC: 1.1 MG/DL (ref 0.5–1.4)
DIFFERENTIAL METHOD: ABNORMAL
DRUG STUDY SPECIMEN TYPE: NORMAL
DRUG STUDY TEST NAME: NORMAL
DRUG STUDY TEST RESULT: NORMAL
EOSINOPHIL # BLD AUTO: 0.3 K/UL (ref 0–0.5)
EOSINOPHIL NFR BLD: 5 % (ref 0–8)
ERYTHROCYTE [DISTWIDTH] IN BLOOD BY AUTOMATED COUNT: 13.4 % (ref 11.5–14.5)
EST. GFR  (AFRICAN AMERICAN): >60 ML/MIN/1.73 M^2
EST. GFR  (NON AFRICAN AMERICAN): 54.3 ML/MIN/1.73 M^2
GLUCOSE SERPL-MCNC: 215 MG/DL (ref 70–110)
HCT VFR BLD AUTO: 38.1 % (ref 37–48.5)
HGB BLD-MCNC: 12.5 G/DL (ref 12–16)
IMM GRANULOCYTES # BLD AUTO: 0.05 K/UL (ref 0–0.04)
IMM GRANULOCYTES NFR BLD AUTO: 0.8 % (ref 0–0.5)
LYMPHOCYTES # BLD AUTO: 0.7 K/UL (ref 1–4.8)
LYMPHOCYTES NFR BLD: 10.8 % (ref 18–48)
MAGNESIUM SERPL-MCNC: 1.4 MG/DL (ref 1.6–2.6)
MCH RBC QN AUTO: 31 PG (ref 27–31)
MCHC RBC AUTO-ENTMCNC: 32.8 G/DL (ref 32–36)
MCV RBC AUTO: 95 FL (ref 82–98)
MONOCYTES # BLD AUTO: 0.5 K/UL (ref 0.3–1)
MONOCYTES NFR BLD: 7.4 % (ref 4–15)
NEUTROPHILS # BLD AUTO: 5 K/UL (ref 1.8–7.7)
NEUTROPHILS NFR BLD: 75.2 % (ref 38–73)
NRBC BLD-RTO: 0 /100 WBC
PHOSPHATE SERPL-MCNC: 3.6 MG/DL (ref 2.7–4.5)
PLATELET # BLD AUTO: 186 K/UL (ref 150–350)
PMV BLD AUTO: 10 FL (ref 9.2–12.9)
POTASSIUM SERPL-SCNC: 4.4 MMOL/L (ref 3.5–5.1)
RBC # BLD AUTO: 4.03 M/UL (ref 4–5.4)
SODIUM SERPL-SCNC: 142 MMOL/L (ref 136–145)
TACROLIMUS BLD-MCNC: 9.1 NG/ML (ref 5–15)
WBC # BLD AUTO: 6.66 K/UL (ref 3.9–12.7)

## 2019-07-08 PROCEDURE — 85025 COMPLETE CBC W/AUTO DIFF WBC: CPT

## 2019-07-08 PROCEDURE — 36415 COLL VENOUS BLD VENIPUNCTURE: CPT

## 2019-07-08 PROCEDURE — 99000 SPECIMEN HANDLING OFFICE-LAB: CPT

## 2019-07-08 PROCEDURE — 83735 ASSAY OF MAGNESIUM: CPT

## 2019-07-08 PROCEDURE — 80197 ASSAY OF TACROLIMUS: CPT

## 2019-07-08 PROCEDURE — 80069 RENAL FUNCTION PANEL: CPT

## 2019-07-08 NOTE — PROGRESS NOTES
Protocol: [id number]  IRB# 2017.512  PI: Nicholas GUAN)  Site: [id number]  Subject #[id number]     VISIT 12 / WEEK 24:       Date of Visit: 08/JUL/2019     Patient arrived at clinic to complete his [id number] visit. Protocol Labs were drawn in morning at 09:00 AM. All specimens processed and shipped per protocol. Evette SARAVIA  conducted visitin its entirety with Sub-I, Sharri Sands and Byron Cardozo present. Patient agrees to continue his participation in the study and all questions answered.                                                                                                                                                                                                                                                                                                                                                    Changes in con-meds. Subject reports, no recent hospitalizations or other healthcare utilization. Subject states, she is feeling much better and no further issues.     Vital signs obtained sitting:  BP: 120/78  HR: 88  RR: 19  Wt: 92.4 kg  T: 98.7 F oral     CMV Disease Assessment, Health Outcomes Assessment, Renal Transplant Outcomes, Health Outcomes Assessment and Child Pierre Score completed. Subject agreed to continue to follow compliance in regards to effective birth control as required by protocol.    Evette SARAVIA, re-consented subject with newest ICF following standard consenting process.      Study participant reminded to call  for any questions. Next visit in 1 month.

## 2019-07-08 NOTE — TELEPHONE ENCOUNTER
----- Message from Jose D Jordan MD sent at 7/8/2019  1:52 PM CDT -----  Labs and diagnostic tests were reviewed. No action/changes indicated.

## 2019-07-11 ENCOUNTER — PATIENT MESSAGE (OUTPATIENT)
Dept: ENDOCRINOLOGY | Facility: CLINIC | Age: 59
End: 2019-07-11

## 2019-07-12 ENCOUNTER — CLINICAL SUPPORT (OUTPATIENT)
Dept: ENDOCRINOLOGY | Facility: CLINIC | Age: 59
End: 2019-07-12
Payer: MEDICARE

## 2019-07-12 PROCEDURE — 95251 PR GLUCOSE MONITOR, 72 HOUR, PHYS INTERP: ICD-10-PCS | Mod: S$GLB,,, | Performed by: NURSE PRACTITIONER

## 2019-07-12 PROCEDURE — 95251 CONT GLUC MNTR ANALYSIS I&R: CPT | Mod: S$GLB,,, | Performed by: NURSE PRACTITIONER

## 2019-07-12 RX ORDER — INSULIN DEGLUDEC 200 U/ML
80 INJECTION, SOLUTION SUBCUTANEOUS NIGHTLY
Qty: 27 ML | Refills: 12 | Status: SHIPPED | OUTPATIENT
Start: 2019-07-12 | End: 2019-10-02

## 2019-07-15 ENCOUNTER — LAB VISIT (OUTPATIENT)
Dept: LAB | Facility: HOSPITAL | Age: 59
End: 2019-07-15
Attending: INTERNAL MEDICINE
Payer: MEDICARE

## 2019-07-15 DIAGNOSIS — Z20.828 PARVOVIRUS EXPOSURE: ICD-10-CM

## 2019-07-15 DIAGNOSIS — Z94.0 KIDNEY REPLACED BY TRANSPLANT: ICD-10-CM

## 2019-07-15 LAB
ALBUMIN SERPL BCP-MCNC: 4 G/DL (ref 3.5–5.2)
ALBUMIN SERPL BCP-MCNC: 4 G/DL (ref 3.5–5.2)
ALP SERPL-CCNC: 209 U/L (ref 55–135)
ALT SERPL W/O P-5'-P-CCNC: 30 U/L (ref 10–44)
ANION GAP SERPL CALC-SCNC: 9 MMOL/L (ref 8–16)
AST SERPL-CCNC: 23 U/L (ref 10–40)
BASOPHILS # BLD AUTO: 0.05 K/UL (ref 0–0.2)
BASOPHILS NFR BLD: 0.7 % (ref 0–1.9)
BILIRUB DIRECT SERPL-MCNC: 0.1 MG/DL (ref 0.1–0.3)
BILIRUB SERPL-MCNC: 0.3 MG/DL (ref 0.1–1)
BUN SERPL-MCNC: 21 MG/DL (ref 8–23)
CALCIUM SERPL-MCNC: 10 MG/DL (ref 8.7–10.5)
CHLORIDE SERPL-SCNC: 102 MMOL/L (ref 95–110)
CO2 SERPL-SCNC: 27 MMOL/L (ref 23–29)
CREAT SERPL-MCNC: 1.3 MG/DL (ref 0.5–1.4)
DIFFERENTIAL METHOD: ABNORMAL
EOSINOPHIL # BLD AUTO: 0.3 K/UL (ref 0–0.5)
EOSINOPHIL NFR BLD: 3.8 % (ref 0–8)
ERYTHROCYTE [DISTWIDTH] IN BLOOD BY AUTOMATED COUNT: 13.1 % (ref 11.5–14.5)
EST. GFR  (AFRICAN AMERICAN): 51.2 ML/MIN/1.73 M^2
EST. GFR  (NON AFRICAN AMERICAN): 44.4 ML/MIN/1.73 M^2
GLUCOSE SERPL-MCNC: 268 MG/DL (ref 70–110)
HCT VFR BLD AUTO: 38.6 % (ref 37–48.5)
HGB BLD-MCNC: 12.5 G/DL (ref 12–16)
IMM GRANULOCYTES # BLD AUTO: 0.03 K/UL (ref 0–0.04)
IMM GRANULOCYTES NFR BLD AUTO: 0.4 % (ref 0–0.5)
LYMPHOCYTES # BLD AUTO: 0.6 K/UL (ref 1–4.8)
LYMPHOCYTES NFR BLD: 8.8 % (ref 18–48)
MAGNESIUM SERPL-MCNC: 1.6 MG/DL (ref 1.6–2.6)
MCH RBC QN AUTO: 31.3 PG (ref 27–31)
MCHC RBC AUTO-ENTMCNC: 32.4 G/DL (ref 32–36)
MCV RBC AUTO: 97 FL (ref 82–98)
MISCELLANEOUS TEST NAME: NORMAL
MONOCYTES # BLD AUTO: 0.4 K/UL (ref 0.3–1)
MONOCYTES NFR BLD: 6.2 % (ref 4–15)
NEUTROPHILS # BLD AUTO: 5.5 K/UL (ref 1.8–7.7)
NEUTROPHILS NFR BLD: 80.1 % (ref 38–73)
NRBC BLD-RTO: 0 /100 WBC
PHOSPHATE SERPL-MCNC: 3.5 MG/DL (ref 2.7–4.5)
PLATELET # BLD AUTO: 195 K/UL (ref 150–350)
PMV BLD AUTO: 10.3 FL (ref 9.2–12.9)
POTASSIUM SERPL-SCNC: 4.4 MMOL/L (ref 3.5–5.1)
PROT SERPL-MCNC: 7.8 G/DL (ref 6–8.4)
RBC # BLD AUTO: 3.99 M/UL (ref 4–5.4)
SODIUM SERPL-SCNC: 138 MMOL/L (ref 136–145)
WBC # BLD AUTO: 6.81 K/UL (ref 3.9–12.7)

## 2019-07-15 PROCEDURE — 83735 ASSAY OF MAGNESIUM: CPT

## 2019-07-15 PROCEDURE — 85025 COMPLETE CBC W/AUTO DIFF WBC: CPT

## 2019-07-15 PROCEDURE — 84075 ASSAY ALKALINE PHOSPHATASE: CPT

## 2019-07-15 PROCEDURE — 36415 COLL VENOUS BLD VENIPUNCTURE: CPT | Mod: PO

## 2019-07-15 PROCEDURE — 82247 BILIRUBIN TOTAL: CPT

## 2019-07-15 PROCEDURE — 80069 RENAL FUNCTION PANEL: CPT

## 2019-07-15 PROCEDURE — 87799 DETECT AGENT NOS DNA QUANT: CPT

## 2019-07-15 PROCEDURE — 80197 ASSAY OF TACROLIMUS: CPT

## 2019-07-15 PROCEDURE — 87799 DETECT AGENT NOS DNA QUANT: CPT | Mod: 91

## 2019-07-16 LAB — TACROLIMUS BLD-MCNC: 7.4 NG/ML (ref 5–15)

## 2019-07-22 ENCOUNTER — LAB VISIT (OUTPATIENT)
Dept: LAB | Facility: HOSPITAL | Age: 59
End: 2019-07-22
Attending: INTERNAL MEDICINE
Payer: MEDICARE

## 2019-07-22 ENCOUNTER — OFFICE VISIT (OUTPATIENT)
Dept: TRANSPLANT | Facility: CLINIC | Age: 59
End: 2019-07-22
Payer: MEDICARE

## 2019-07-22 VITALS
RESPIRATION RATE: 18 BRPM | BODY MASS INDEX: 32.73 KG/M2 | SYSTOLIC BLOOD PRESSURE: 114 MMHG | HEART RATE: 84 BPM | TEMPERATURE: 98 F | HEIGHT: 67 IN | DIASTOLIC BLOOD PRESSURE: 65 MMHG | OXYGEN SATURATION: 98 % | WEIGHT: 208.56 LBS

## 2019-07-22 DIAGNOSIS — B34.3 PARVOVIRUS B19 INFECTION: ICD-10-CM

## 2019-07-22 DIAGNOSIS — Z94.0 KIDNEY REPLACED BY TRANSPLANT: ICD-10-CM

## 2019-07-22 DIAGNOSIS — Z79.899 IMMUNOSUPPRESSIVE MANAGEMENT ENCOUNTER FOLLOWING KIDNEY TRANSPLANT: ICD-10-CM

## 2019-07-22 DIAGNOSIS — Z94.0 LIVING-DONOR KIDNEY TRANSPLANT RECIPIENT: ICD-10-CM

## 2019-07-22 DIAGNOSIS — Z91.89 AT RISK FOR OPPORTUNISTIC INFECTIONS: ICD-10-CM

## 2019-07-22 DIAGNOSIS — Z94.0 IMMUNOSUPPRESSIVE MANAGEMENT ENCOUNTER FOLLOWING KIDNEY TRANSPLANT: ICD-10-CM

## 2019-07-22 DIAGNOSIS — N18.30 CHRONIC KIDNEY DISEASE (CKD), STAGE III (MODERATE): ICD-10-CM

## 2019-07-22 DIAGNOSIS — D61.9 APLASTIC ANEMIA: ICD-10-CM
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
## 2019-07-22 DIAGNOSIS — R26.89 IMBALANCE: ICD-10-CM

## 2019-07-22 LAB
ALBUMIN SERPL BCP-MCNC: 3.8 G/DL (ref 3.5–5.2)
ANION GAP SERPL CALC-SCNC: 9 MMOL/L (ref 8–16)
BASOPHILS # BLD AUTO: 0.04 K/UL (ref 0–0.2)
BASOPHILS NFR BLD: 0.6 % (ref 0–1.9)
BUN SERPL-MCNC: 20 MG/DL (ref 8–23)
CALCIUM SERPL-MCNC: 10 MG/DL (ref 8.7–10.5)
CHLORIDE SERPL-SCNC: 104 MMOL/L (ref 95–110)
CO2 SERPL-SCNC: 27 MMOL/L (ref 23–29)
CREAT SERPL-MCNC: 1.2 MG/DL (ref 0.5–1.4)
DIFFERENTIAL METHOD: ABNORMAL
EOSINOPHIL # BLD AUTO: 0.3 K/UL (ref 0–0.5)
EOSINOPHIL NFR BLD: 5 % (ref 0–8)
ERYTHROCYTE [DISTWIDTH] IN BLOOD BY AUTOMATED COUNT: 12.9 % (ref 11.5–14.5)
EST. GFR  (AFRICAN AMERICAN): 56.4 ML/MIN/1.73 M^2
EST. GFR  (NON AFRICAN AMERICAN): 48.9 ML/MIN/1.73 M^2
GLUCOSE SERPL-MCNC: 215 MG/DL (ref 70–110)
HCT VFR BLD AUTO: 37.8 % (ref 37–48.5)
HGB BLD-MCNC: 12.4 G/DL (ref 12–16)
IMM GRANULOCYTES # BLD AUTO: 0.04 K/UL (ref 0–0.04)
IMM GRANULOCYTES NFR BLD AUTO: 0.6 % (ref 0–0.5)
LYMPHOCYTES # BLD AUTO: 0.7 K/UL (ref 1–4.8)
LYMPHOCYTES NFR BLD: 10.9 % (ref 18–48)
MAGNESIUM SERPL-MCNC: 1.6 MG/DL (ref 1.6–2.6)
MCH RBC QN AUTO: 31.3 PG (ref 27–31)
MCHC RBC AUTO-ENTMCNC: 32.8 G/DL (ref 32–36)
MCV RBC AUTO: 96 FL (ref 82–98)
MONOCYTES # BLD AUTO: 0.4 K/UL (ref 0.3–1)
MONOCYTES NFR BLD: 6.7 % (ref 4–15)
NEUTROPHILS # BLD AUTO: 4.8 K/UL (ref 1.8–7.7)
NEUTROPHILS NFR BLD: 76.2 % (ref 38–73)
NRBC BLD-RTO: 0 /100 WBC
PHOSPHATE SERPL-MCNC: 4.1 MG/DL (ref 2.7–4.5)
PLATELET # BLD AUTO: 175 K/UL (ref 150–350)
PMV BLD AUTO: 10 FL (ref 9.2–12.9)
POTASSIUM SERPL-SCNC: 4.7 MMOL/L (ref 3.5–5.1)
RBC # BLD AUTO: 3.96 M/UL (ref 4–5.4)
SODIUM SERPL-SCNC: 140 MMOL/L (ref 136–145)
WBC # BLD AUTO: 6.25 K/UL (ref 3.9–12.7)

## 2019-07-22 PROCEDURE — 99215 PR OFFICE/OUTPT VISIT, EST, LEVL V, 40-54 MIN: ICD-10-PCS | Mod: S$GLB,,, | Performed by: INTERNAL MEDICINE

## 2019-07-22 PROCEDURE — 99999 PR PBB SHADOW E&M-EST. PATIENT-LVL IV: CPT | Mod: PBBFAC,,, | Performed by: INTERNAL MEDICINE

## 2019-07-22 PROCEDURE — 85025 COMPLETE CBC W/AUTO DIFF WBC: CPT

## 2019-07-22 PROCEDURE — 83735 ASSAY OF MAGNESIUM: CPT

## 2019-07-22 PROCEDURE — 99999 PR PBB SHADOW E&M-EST. PATIENT-LVL IV: ICD-10-PCS | Mod: PBBFAC,,, | Performed by: INTERNAL MEDICINE

## 2019-07-22 PROCEDURE — 80197 ASSAY OF TACROLIMUS: CPT

## 2019-07-22 PROCEDURE — 80069 RENAL FUNCTION PANEL: CPT

## 2019-07-22 PROCEDURE — 99214 OFFICE O/P EST MOD 30 MIN: CPT | Mod: PBBFAC | Performed by: INTERNAL MEDICINE

## 2019-07-22 PROCEDURE — 36415 COLL VENOUS BLD VENIPUNCTURE: CPT | Mod: PO

## 2019-07-22 PROCEDURE — 99215 OFFICE O/P EST HI 40 MIN: CPT | Mod: S$GLB,,, | Performed by: INTERNAL MEDICINE

## 2019-07-22 RX ORDER — OXYBUTYNIN CHLORIDE 10 MG/1
10 TABLET, EXTENDED RELEASE ORAL DAILY
Qty: 90 TABLET | Refills: 3 | Status: SHIPPED | OUTPATIENT
Start: 2019-07-22 | End: 2019-08-28

## 2019-07-22 RX ORDER — ERGOCALCIFEROL 1.25 MG/1
CAPSULE ORAL
Qty: 4 CAPSULE | Refills: 0 | Status: SHIPPED | OUTPATIENT
Start: 2019-07-22 | End: 2019-07-29

## 2019-07-22 NOTE — LETTER
July 23, 2019        Dalton Heaton  [address] [facility] NEPHROLOGY [place]  [place] [address]  Phone: [phone]  Fax: [phone]             [facility]  [address]  [facility] [address]  Phone: [phone]   Patient: Andra Newell   MR Number: [id number]   YOB: 1960   Date of Visit: 7/22/2019       Dear Dr. Dalton Heaton    Thank you for referring Andra Newell to me for evaluation. Attached you will find relevant portions of my assessment and plan of care.    If you have questions, please do not hesitate to call me. I look forward to following nAdra Newell along with you.    Sincerely,    Maureen Cabral MD    Enclosure    If you would like to receive this communication electronically, please contact [email or web address] or [phone] to request [employer] access.    [employer] Link is a tool which provides read-only access to select patient information with whom you have a relationship. Its easy to use and provides real time access to review your patients record including encounter summaries, notes, results, and demographic information.    If you feel you have received this communication in error or would no longer like to receive these types of communications, please e-mail [email or web address]

## 2019-07-22 NOTE — Clinical Note
She needs Neurology consult, and is requesting to see the provider who consulted in the hospital: Ashish Walden III.  I ordered a CT of head and neurology consult.  I will cc Dr. Walden to see if he prefers another imaging study prior to seeing her.Thank you

## 2019-07-22 NOTE — Clinical Note
ADDENDUM:  Spoke with Dr. Kessler, who believes it is acceptable to resume CellCept with close monitoring of her white count.  Will start at low dose of 250 mg, 1 cap twice daily.  Continue serial monitoring of parvovirus PCR to ensure counts do not worsen.

## 2019-07-22 NOTE — Clinical Note
Cammie, She is still parvovirus positive, but viral load decreasing and counts look great.  A what point do you believe it would be safe to restart mycophenolate?

## 2019-07-23 ENCOUNTER — PATIENT MESSAGE (OUTPATIENT)
Dept: ENDOCRINOLOGY | Facility: CLINIC | Age: 59
End: 2019-07-23

## 2019-07-23 LAB
B19V DNA # SPEC NAA+PROBE: 1248 COPIES/ML
TACROLIMUS BLD-MCNC: 8.8 NG/ML (ref 5–15)

## 2019-07-23 NOTE — PROGRESS NOTES
Kidney Post-Transplant Assessment    Referring Physician: Dalton Heaton  Current Nephrologist: Dalton Heaton    ORGAN: LEFT KIDNEY  Donor Type: living  PHS Increased Risk: no  Cold Ischemia: 37 mins  Induction Medications: campath - alemtuzumab (anti-cd52)    Subjective:     CC:  Reassessment of renal allograft function and management of chronic immunosuppression.    HPI:  Ms. Newell is a 59 y.o. year old White female who received a living kidney transplant on 1/14/19. Her most recent creatinine is 1.1. She takes mycophenolate mofetil (held d/t recurrent ESBL UTIs Jan-Feb 2019) and tacrolimus for maintenance immunosuppression. Her post transplant course has been uncomplicated to date.    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 She briefly required HD 4/13/18 until ~Sept 2018.  she was predialysis at the time of transplant.   -LURD KT (friend) 01/14/2019,  Induced with Campath.     -Recurrent K pneumoniae ESBL 2019 UTI 1/30, 2/11, and  2/25  -Admit for unsteadiness and dizziness with work up WNL, MRI pending (r/s d/t hosp admit)  -admitted 05/07/2019 with fever infectious workup negative for bacteria.  Refractory anemia noted, and it was found a parvovirus B19 from 4/15/19 causing aplastic anemia was positive  -colonoscopy May 2019 for diarrhea unrevealing.    PCP PROPHYLAXIS: Until 1/14/19; Atovaquone; Bactrim stopped 01/24/2019 D/T hiK  CMV PROPHYLAXIS: CMV  Mismatch -study participant--study participation terminated May 2019 when pancytopenia noted, and it became clear she would require longer-term withholding of antiviral medication than was allowed in the research study. Rx until 7/14/19  FUNGAL PROPHYLAXIS: None    Andra  Presents today for follow-up 6 months post transplant.  Overall she is feeling better.  She notes the fatigue and frequent infections she was having earlier have resolved!  She is concerned about feeling off  "balance.  She notes when she 1st stands up she staggers her 1st step.  She had a fall 2 weeks ago related to being off balance.  She also notes that she is lightheaded while turning in bed, even as she remains flat.  Changing positions does not affect her, such as going from sitting to standing.  She reports that when she leans over to pick something up off the floor, then sits up, she will notice feeling a little lightheaded.    From the transplant perspective, she denies any pain over the allograft or LUTS.    She has been on long-standing vitamin-D.  Prior vitamin-D levels have been mostly less than 15.  She is questioning whether she should stop.  She had multiple levels drawn in the past, but states did not start vitamin D supplementation until transplant.    Review of Systems   Constitutional: Negative for fever.   Eyes: Negative for visual disturbance.   Respiratory: Negative for shortness of breath.    Cardiovascular: Negative for chest pain and leg swelling.   Gastrointestinal: Negative.    Genitourinary: Negative for difficulty urinating.   Musculoskeletal: Negative.    Skin: Negative for rash.   Allergic/Immunologic: Positive for immunocompromised state.   Neurological: Negative for tremors.        Off balance: See HPI     Objective: /65 (BP Location: Right arm, Patient Position: Sitting, BP Method: Large (Automatic))   Pulse 84   Temp 98.4 °F (36.9 °C) (Oral)   Resp 18   Ht 5' 7" (1.702 m)   Wt 94.6 kg (208 lb 8.9 oz)   LMP 02/28/2012   SpO2 98%   BMI 32.66 kg/m²      orthostatic vitals:    Lying - 162/75, 79   Sitting - 151/74, 81   Standing -155/71, 89    Physical Exam   Constitutional: No distress.   Cardiovascular: Normal rate and regular rhythm.   Pulmonary/Chest: Effort normal and breath sounds normal. No respiratory distress.   Abdominal: Soft. She exhibits no mass. There is no tenderness.   Musculoskeletal: She exhibits no edema.   Neurological: Coordination (  Noted to stagger upon " initially standing, taking an extra step to catch herself.) abnormal.   Skin: Skin is warm and dry.   Psychiatric: She has a normal mood and affect.     Labs:  Lab Results   Component Value Date    WBC 6.25 07/22/2019    HGB 12.4 07/22/2019    HCT 37.8 07/22/2019     07/22/2019    K 4.7 07/22/2019     07/22/2019    CO2 27 07/22/2019    BUN 20 07/22/2019    CREATININE 1.2 07/22/2019    EGFRNONAA 48.9 (A) 07/22/2019    CALCIUM 10.0 07/22/2019    PHOS 4.1 07/22/2019    MG 1.6 07/22/2019    ALBUMIN 3.8 07/22/2019    AST 23 07/15/2019    ALT 30 07/15/2019    UTPCR 0.14 07/15/2019    .0 (H) 02/15/2019    TACROLIMUS 7.4 07/15/2019     Lab Results   Component Value Date    EXTANC 1,240 04/15/2019    EXTWBC 2.0 (L) 04/15/2019    EXTSEGS 62.0 04/15/2019    EXTPLATELETS 141 04/15/2019    EXTHEMOGLOBI 8.7 (L) 04/15/2019    EXTHEMATOCRI 27.6 (L) 04/15/2019    EXTCREATININ 0.84 04/15/2019    EXTSODIUM 140 04/15/2019    EXTPOTASSIUM 4.3 04/15/2019    EXTBUN 13 04/15/2019    EXTCO2 23.0 04/15/2019    EXTCALCIUM 8.6 04/15/2019    EXTGLUCOSE 128 (H) 04/15/2019    EXTALBUMIN 3.4 04/15/2019    EXTAST 37 04/15/2019    EXTALT 35 (H) 04/15/2019    EXTBILITOTAL 0.8 04/15/2019     No results found for: EXTCYCLOSLVL, EXTSIROLIMUS, EXTTACROLVL, EXTPROTCRE, EXTPTHINTACT, EXTPROTEINUA, EXTWBCUA, EXTRBCUA    Labs were reviewed with the patient    Assessment:     1. Fall, initial encounter    2. Imbalance    3. Chronic kidney disease (CKD), stage III (moderate)    4. Living-donor kidney transplant recipient    5. Immunosuppressive management encounter following kidney transplant    6. Aplastic anemia with parvovirus B19 infection 4/2019    7. Parvovirus B19 infection    8. At risk for opportunistic infections        Plan:   -CKD3a s/p kidney transplantation, doing well. Continue to monitor renal function and electrolytes, HTN, secondary hyperparathyroidism and other issues related to underlying ESRD.      -New problem:   Staggering when standing, dizzy while rolling over in bed, and fall x1.  This is concerning for a neurologic issue.  While awaiting consultation, will obtain CT scan of the head without contrast.   I have asked her to consider using a walker or cane to stabilize herself an decrease likelihood of falls. I reviewed inpt record and note she saw neurology 4/5/19 (Ashish Walden III) who thought she had   MRI findings of significant lumbar stenosis causing neurogenic claudication.   I advised her to follow up with Neurology, and she requests she establish with any neurologist here, ideally the physician who saw her in the hospital.    -aplastic anemia from parvovirus B 19 infection.  Already given IVIG with evidence of improvement.  Continue close monitoring.  Parvovirus remains detectable, but counts have essentially normalized.  Will question ID whether it is possible to resume low-dose CellCept despite viremia.   Message sent to Dr. Kessler for her thoughts..    -Continue tacrolimus-based immunosuppression. Goal tacro dropped to 5-7 after immuknow cylex came back very low and parvovirus diagnosis made, both indicative of over immunosuppression.  She remains off of CellCept because of history of diarrhea, neutropenia, and parvovirus.  Must watch very closely for drug toxicity and med-related side effects as well as balance risk of infections vs. Rejection in this very complex case.      Continue to follow Immuknow cylex monthly    -Hypomagnesemia- will continue magnesium oxide 800 mg daily;  Will tolerate mild asymptomatic low level d/t pill burden, DDI, and adverse SE     -OI prophylaxis:     -Bactrim d/c'd d/t hyperkalemia, on atovaquone.     -Valcyte study medication for CMV prophylaxis held last admit; OK to resume.    -Fatigue & weakness:  Significantly improved as anemia resolving and parvovirus B19 being treated.  Continue to monitor  .  -diarrhea appears to have resolved.  Observe for symptoms.    -hypertension -  BP ok watch to ensure does not get too low.   Orthostatics today are negative.    -h/o mood disorder:  Cont pre-txp meds    -anemia multifactorial:?chronic disease and parvovirus induced aplastic anemia with known iron deficiency.  Weekly Procrit ordered to keep hgb 9-10 as per guidelines. Actually improving nicely after IVIG.    I am pleased to see that she is improving!  Nonetheless, we discussed that having to lower her immunosuppression to resolve the opportunistic parvovirus infection places her at increased risk for rejection down the road.  As such, I wish to follow monthly Allosure and side lmmunocylex levels.  She will have labs weekly (CBC, RP, tacro) for the time being including  q2week parvo PCR.    Follow-up:   Clinic: return to transplant clinic weekly for the first month after transplant; every 2 weeks during months 2-3; then at 6-, 9-, 12-, 18-, 24-, and 36- months post-transplant to reassess for complications from immunosuppression toxicity and monitor for rejection.  Annually thereafter.    Labs: since patient remains at high risk for rejection and drug-related complications that warrant close monitoring, labs will be ordered as follows: continue twice weekly CBC, renal panel, and drug level for first month; then same labs once weekly through 3rd month post-transplant.  Urine for UA and protein/creatinine ratio monthly.  Urine BK - PCR at 1-, 3-, 6-, 9-, 12-, 18-, 24-, and 36- months post-transplant.  Hepatic panel at 1-, 2-, 3-, 6-, 9-, 12-, 18-, 24-, and 36- months post-transplant.    Maureen Cabral MD        ADDENDUM:  Spoke with Dr. Kessler, who believes it is acceptable to resume CellCept with close monitoring of her white count.  Will start at low dose of 250 mg, 1 cap twice daily.  Continue serial monitoring of parvovirus PCR to ensure counts do not worsen.

## 2019-07-24 DIAGNOSIS — Z94.0 KIDNEY REPLACED BY TRANSPLANT: Primary | ICD-10-CM

## 2019-07-24 RX ORDER — MYCOPHENOLATE MOFETIL 250 MG/1
250 CAPSULE ORAL 2 TIMES DAILY
Qty: 60 CAPSULE | Refills: 11 | Status: SHIPPED | OUTPATIENT
Start: 2019-07-24 | End: 2019-10-10

## 2019-07-24 NOTE — TELEPHONE ENCOUNTER
----- Message from Maureen Cabral MD sent at 7/24/2019 12:37 PM CDT -----  Thanks!  Will ask my nurse to restart her on to 50 mg b.i.d..  We are continuing close monitoring of  parvovirus PCRs.    ----- Message -----  From: Cammie Kessler MD  Sent: 7/24/2019  11:59 AM  To: Maureen Cabral MD    I think it would be okay to restart the MMF with close monitoring of her cell counts.    ----- Message -----  From: Maureen Cabral MD  Sent: 7/23/2019   5:48 PM  To: MD Cammie Winston,   She is still parvovirus positive, but viral load decreasing and counts look great.  A what point do you believe it would be safe to restart mycophenolate?

## 2019-07-29 ENCOUNTER — OFFICE VISIT (OUTPATIENT)
Dept: NEUROLOGY | Facility: CLINIC | Age: 59
End: 2019-07-29
Payer: MEDICARE

## 2019-07-29 ENCOUNTER — HOSPITAL ENCOUNTER (OUTPATIENT)
Dept: RADIOLOGY | Facility: HOSPITAL | Age: 59
Discharge: HOME | End: 2019-07-29
Attending: INTERNAL MEDICINE
Payer: MEDICARE

## 2019-07-29 VITALS
BODY MASS INDEX: 32.7 KG/M2 | WEIGHT: 208.31 LBS | HEART RATE: 95 BPM | DIASTOLIC BLOOD PRESSURE: 76 MMHG | HEIGHT: 67 IN | SYSTOLIC BLOOD PRESSURE: 136 MMHG

## 2019-07-29 DIAGNOSIS — M48.062 LUMBAR STENOSIS WITH NEUROGENIC CLAUDICATION: Primary | ICD-10-CM

## 2019-07-29 DIAGNOSIS — R26.89 IMBALANCE: ICD-10-CM

## 2019-07-29 DIAGNOSIS — M51.36 DDD (DEGENERATIVE DISC DISEASE), LUMBAR: ICD-10-CM
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
## 2019-07-29 DIAGNOSIS — R26.9 GAIT ABNORMALITY: ICD-10-CM

## 2019-07-29 PROCEDURE — 99999 PR PBB SHADOW E&M-EST. PATIENT-LVL IV: CPT | Mod: PBBFAC,GC,, | Performed by: STUDENT IN AN ORGANIZED HEALTH CARE EDUCATION/TRAINING PROGRAM

## 2019-07-29 PROCEDURE — 3075F PR MOST RECENT SYSTOLIC BLOOD PRESS GE 130-139MM HG: ICD-10-PCS | Mod: CPTII,GC,S$GLB, | Performed by: PSYCHIATRY & NEUROLOGY

## 2019-07-29 PROCEDURE — 70450 CT HEAD/BRAIN W/O DYE: CPT | Mod: TC

## 2019-07-29 PROCEDURE — 3075F SYST BP GE 130 - 139MM HG: CPT | Mod: CPTII,GC,S$GLB, | Performed by: PSYCHIATRY & NEUROLOGY

## 2019-07-29 PROCEDURE — 70450 CT HEAD/BRAIN W/O DYE: CPT | Mod: 26,,, | Performed by: RADIOLOGY

## 2019-07-29 PROCEDURE — 70450 CT HEAD WITHOUT CONTRAST: ICD-10-PCS | Mod: 26,,, | Performed by: RADIOLOGY

## 2019-07-29 PROCEDURE — 99213 PR OFFICE/OUTPT VISIT, EST, LEVL III, 20-29 MIN: ICD-10-PCS | Mod: GC,S$GLB,, | Performed by: PSYCHIATRY & NEUROLOGY

## 2019-07-29 PROCEDURE — 3078F DIAST BP <80 MM HG: CPT | Mod: CPTII,GC,S$GLB, | Performed by: PSYCHIATRY & NEUROLOGY

## 2019-07-29 PROCEDURE — 99213 OFFICE O/P EST LOW 20 MIN: CPT | Mod: GC,S$GLB,, | Performed by: PSYCHIATRY & NEUROLOGY

## 2019-07-29 PROCEDURE — 3008F PR BODY MASS INDEX (BMI) DOCUMENTED: ICD-10-PCS | Mod: CPTII,GC,S$GLB, | Performed by: PSYCHIATRY & NEUROLOGY

## 2019-07-29 PROCEDURE — 99999 PR PBB SHADOW E&M-EST. PATIENT-LVL IV: ICD-10-PCS | Mod: PBBFAC,GC,, | Performed by: STUDENT IN AN ORGANIZED HEALTH CARE EDUCATION/TRAINING PROGRAM

## 2019-07-29 PROCEDURE — 3078F PR MOST RECENT DIASTOLIC BLOOD PRESSURE < 80 MM HG: ICD-10-PCS | Mod: CPTII,GC,S$GLB, | Performed by: PSYCHIATRY & NEUROLOGY

## 2019-07-29 PROCEDURE — 3008F BODY MASS INDEX DOCD: CPT | Mod: CPTII,GC,S$GLB, | Performed by: PSYCHIATRY & NEUROLOGY

## 2019-07-29 NOTE — PROGRESS NOTES
Patient Name: Andra Newell  MRN: [id number]    CC: feeling off balance    HPI: Andra Newell is a 59 y.o. female referred by kidney transplant service for recent problems with balance. Patient with history of Lumbar stenosis and neurogenic claudication. She was seen at neurology clinic by Dr Walden in March of 2019 and was advised to f/u after her kidney transplant related issues were straighten up. She also has a left side achilles tendon rupture w/o repair.   Patient also has some dizziness upon rolling in bed and complains of fullness in her ear as well. She has set up an appointment with ENT for this week for this problem.       ROS:   Review of Systems   Constitutional: Negative for malaise/fatigue. Negative for weight loss.   HENT: Negative for hearing loss. Positive for left ear fullness.   Eyes: Negative for blurred vision and double vision.   Respiratory: Negative for shortness of breath and stridor.   Cardiovascular: Negative for chest pain and palpitations.   Gastrointestinal: Negative for nausea, vomiting and constipation.   Genitourinary: Negative for frequency. Negative for urgency.   Musculoskeletal: Negative for joint pain. Negative for myalgias. Positive for mechanical falls.   Skin: Negative for rash.   Neurological: Positive  for dizziness. Negative for focal weakness and seizures.   Endo/Heme/Allergies: Does not bruise/bleed easily.   Psychiatric/Behavioral: Negative for memory loss. Negative for depression and hallucinations. The patient is not nervous/anxious.      Past Medical History  Past Medical History:   Diagnosis Date    Anemia     Anemia in chronic kidney disease, on chronic dialysis 7/12/2017    Aplastic anemia with parvovirus B19 infection 5/27/2019    Arthritis     Asthma     allergic airway    CKD stage III (moderate) 2/18/2019    Colon polyp     Depression     Diabetes mellitus     Diverticulosis     Eosinophilia     ESRD (end stage renal disease)     stage  V, due for living donor 9/2018    ESRD on dialysis     GERD (gastroesophageal reflux disease)     Gout     Gout     Hyperlipidemia     Hypertension     Hypertriglyceridemia without hypercholesterolemia 6/9/2019    Low back pain     Low HDL (under 40) 6/9/2019    MRSA carrier     Neutropenia, unspecified 5/8/2019    Obesity     Renal disorder     Rotator cuff syndrome--left     Thyroid disease     Ulnar neuropathy of right upper extremity     Uncontrolled type 2 diabetes mellitus with hyperglycemia        Medications    Current Outpatient Medications:     blood sugar diagnostic (BLOOD GLUCOSE TEST) Strp, Checks her bg 4 times a day   e11.9, Disp: 400 strip, Rfl: 4    insulin aspart U-100 (NOVOLOG FLEXPEN U-100 INSULIN) 100 unit/mL (3 mL) InPn pen, 24 units AC + correction scale. Max TDD: 92 units, Disp: 30 mL, Rfl: 12    insulin degludec (TRESIBA FLEXTOUCH U-200) 200 unit/mL (3 mL) InPn, Inject 80 Units into the skin every evening., Disp: 27 mL, Rfl: 12    levothyroxine (SYNTHROID) 75 MCG tablet, Take 1 tablet (75 mcg total) by mouth once daily., Disp: 30 tablet, Rfl: 4    LORazepam (ATIVAN) 2 MG Tab, Take 1 tablet (2 mg total) by mouth 2 (two) times daily., Disp: 60 tablet, Rfl: 1    magnesium oxide (MAG-OX) 400 mg (241.3 mg magnesium) tablet, Take 2 tablets (800 mg total) by mouth once daily., Disp: 60 tablet, Rfl: 11    multivitamin (THERAGRAN) tablet, Take 1 tablet by mouth once daily., Disp: , Rfl:     mycophenolate (CELLCEPT) 250 mg Cap, Take 1 capsule (250 mg total) by mouth 2 (two) times daily., Disp: 60 capsule, Rfl: 11    oxybutynin (DITROPAN-XL) 10 MG 24 hr tablet, Take 1 tablet (10 mg total) by mouth once daily., Disp: 90 tablet, Rfl: 3    pantoprazole (PROTONIX) 40 MG tablet, Take 1 tablet (40 mg total) by mouth once daily., Disp: 30 tablet, Rfl: 11    rosuvastatin (CRESTOR) 10 MG tablet, Take 1 tablet (10 mg total) by mouth once daily., Disp: 90 tablet, Rfl: 1     sulfamethoxazole-trimethoprim 400-80mg (BACTRIM,SEPTRA) 400-80 mg per tablet, Take 1 tablet by mouth once daily., Disp: 30 tablet, Rfl: 6    tacrolimus (PROGRAF) 0.5 MG Cap, Take 1 capsule (0.5 mg total) by mouth every evening. Total 2mg in AM PO and 1.5mg PO in PM Z94.0, Disp: 30 capsule, Rfl: 11    tacrolimus (PROGRAF) 1 MG Cap, Take 2 capsules (2 mg total) by mouth every morning AND 1 capsule (1 mg total) every evening. Total 2mg in AM PO and 1.5mg in PM PO. Z94.0., Disp: 90 capsule, Rfl: 11    vilazodone (VIIBRYD) 40 mg Tab tablet, Take 1 tablet (40 mg total) by mouth once daily., Disp: 30 tablet, Rfl: 4    Current Facility-Administered Medications:     sodium chloride 0.9% bolus 2,000 mL, 2,000 mL, Intravenous, Once, Maureen Cabral MD    Allergies  Review of patient's allergies indicates:   Allergen Reactions    Torsemide Diarrhea     Diarrhea stopped once discontinued med    Codeine Itching     Can take oxycodone and hydrocodone with Benadryl       Social History  Social History     Socioeconomic History    Marital status: Significant Other     Spouse name: Not on file    Number of children: 2    Years of education: Not on file    Highest education level: Not on file   Occupational History    Occupation: retired     Comment:    Social Needs    Financial resource strain: Not on file    Food insecurity:     Worry: Not on file     Inability: Not on file    Transportation needs:     Medical: Not on file     Non-medical: Not on file   Tobacco Use    Smoking status: Never Smoker    Smokeless tobacco: Never Used   Substance and Sexual Activity    Alcohol use: No    Drug use: No    Sexual activity: Never   Lifestyle    Physical activity:     Days per week: Not on file     Minutes per session: Not on file    Stress: Not on file   Relationships    Social connections:     Talks on phone: Not on file     Gets together: Not on file     Attends [employer] service: Not on  "file     Active member of club or organization: Not on file     Attends meetings of clubs or organizations: Not on file     Relationship status: Not on file   Other Topics Concern    Not on file   Social History Narrative    Not on file       Family History  Family History   Problem Relation Age of Onset    Diabetes Mother     Alzheimer's disease Mother     Thyroid disease Mother     Hyperlipidemia Mother     Heart disease Father     Stroke Father     Diabetes Sister     Lupus Sister     Ovarian cancer Sister     Heart disease Brother     No Known Problems Son     Diabetes Maternal Grandmother     Hypertension Maternal Grandmother     No Known Problems Paternal Grandmother     No Known Problems Paternal Grandfather     COPD Maternal Aunt     Diabetes Maternal Aunt     Heart disease Maternal Aunt     Hypertension Maternal Aunt     No Known Problems Maternal Uncle     No Known Problems Paternal Aunt     No Known Problems Paternal Uncle     Colon cancer Neg Hx     Colon polyps Neg Hx     Crohn's disease Neg Hx     Ulcerative colitis Neg Hx     Celiac disease Neg Hx        Physical Exam  /76   Pulse 95   Ht 5' 7" (1.702 m)   Wt 94.5 kg (208 lb 5.4 oz)   LMP 02/28/2012   BMI 32.63 kg/m²     General appearance: Well-developed, well-groomed.     Neurologic Exam: The patient is awake, alert and oriented. Language is fluent. Recent and remote memory are normal. Attention span and concentration are normal. Fund of knowledge is appropriate.     Cranial nerves: pupils are round and reactive to light and accommodation. Visual fields are full to confrontation. Ocular motility is full in all cardinal positions of gaze. Facial sensation is normal to pinprick and light touch. Corneal reflexes are present bilaterally. Facial activation is symmetric. Hearing is normal bilaterally. Palate elevates symmetrically and gag reflex is intact bilaterally. Shoulder elevation is symmetric and full " strength bilaterally. Tongue is midline and neck rotation strength is normal bilaterally. Neck range of motion is normal.     Motor examination of all extremities demonstrates muscle atrophy in BLE in gastrocnemius and quadriceps. Strength is 5/5 in the upper and lower extremities bilaterally without pronator drift.     Sensory examination is normal to pinprick, vibration and proprioception in the upper and lower extremities bilaterally. Romberg is negative.    Deep tendon reflexes are 2+ and symmetric in the upper and lower extremities bilaterally. Toes are mute bilaterally.     Gait: Normal heel, toe, and casual gait. Unable to do tandem gait due to instability secondary to knee surgeries and left side achilles tendon rupture.     Coordination: Finger to nose and heel to shin testing is normal in both upper and lower extremities. Rapid alternating movements are normal in both upper and lower extremities.     General exam  Cardiovascular: regular rate and rhythm with no murmurs, rubs or gallops. There are no carotid or vertebral artery bruits. Pulses in both upper and lower extremities are symmetric. There is no peripheral edema.   Head and neck: no cervical lymphadenopathy      Lab and Test Results    WBC   Date Value Ref Range Status   07/22/2019 6.25 3.90 - 12.70 K/uL Final     Hemoglobin   Date Value Ref Range Status   07/22/2019 12.4 12.0 - 16.0 g/dL Final     Hematocrit   Date Value Ref Range Status   07/22/2019 37.8 37.0 - 48.5 % Final     Platelets   Date Value Ref Range Status   07/22/2019 175 150 - 350 K/uL Final     Glucose   Date Value Ref Range Status   07/22/2019 215 (H) 70 - 110 mg/dL Final     Sodium   Date Value Ref Range Status   07/22/2019 140 136 - 145 mmol/L Final     Potassium   Date Value Ref Range Status   07/22/2019 4.7 3.5 - 5.1 mmol/L Final     Chloride   Date Value Ref Range Status   07/22/2019 104 95 - 110 mmol/L Final     CO2   Date Value Ref Range Status   07/22/2019 27 23 - 29 mmol/L  Final     BUN, Bld   Date Value Ref Range Status   07/22/2019 20 8 - 23 mg/dL Final     Creatinine   Date Value Ref Range Status   07/22/2019 1.2 0.5 - 1.4 mg/dL Final     Calcium   Date Value Ref Range Status   07/22/2019 10.0 8.7 - 10.5 mg/dL Final     Magnesium   Date Value Ref Range Status   07/22/2019 1.6 1.6 - 2.6 mg/dL Final     Phosphorus   Date Value Ref Range Status   07/22/2019 4.1 2.7 - 4.5 mg/dL Final     Alkaline Phosphatase   Date Value Ref Range Status   07/15/2019 209 (H) 55 - 135 U/L Final     ALT   Date Value Ref Range Status   07/15/2019 30 10 - 44 U/L Final     AST   Date Value Ref Range Status   07/15/2019 23 10 - 40 U/L Final         Images: none      Assessment and Plan    Andra Newell is a 59 y.o. female with prior visit at neurology clinic with diagnosis of neurogenic claudication.  Patient with history of  neurogenic claudication secondary to lumbar stenosis. She was seen at neurology clinic by Dr Walden in March of 2019 and was advised to f/u after her kidney transplant related issues were straighten up. She also has a left side achilles tendon rupture w/o repair. Her gait instability is a combination of both problems. She was advised to see back and spine surgery for planning for surgery. She was advised about the prognosis of delaying surgery. She already has lost a lot of muscle bulk in BLE. She was referred to EMG/NCS  Prognostically.  She will f/u with neurology in 3 months.      Problem List Items Addressed This Visit        Neuro    Lumbar stenosis with neurogenic claudication - Primary    Relevant Orders    EMG W/ ULTRASOUND AND NERVE CONDUCTION TEST 2 Extremities    Ambulatory Consult to Back & Spine Clinic      Other Visit Diagnoses     DDD (degenerative disc disease), lumbar        Relevant Orders    Ambulatory Consult to Back & Spine Clinic    Gait abnormality        Relevant Orders    EMG W/ ULTRASOUND AND NERVE CONDUCTION TEST 2 Extremities    Ambulatory Consult to  Back & Spine Clinic                 Krysten Metzger MD  Neurology Resident   [facility]  [address] [place] [address]

## 2019-07-31 ENCOUNTER — PATIENT MESSAGE (OUTPATIENT)
Dept: ENDOCRINOLOGY | Facility: CLINIC | Age: 59
End: 2019-07-31

## 2019-08-05 ENCOUNTER — LAB VISIT (OUTPATIENT)
Dept: LAB | Facility: HOSPITAL | Age: 59
End: 2019-08-05
Attending: INTERNAL MEDICINE
Payer: MEDICARE

## 2019-08-05 DIAGNOSIS — Z20.828 PARVOVIRUS EXPOSURE: ICD-10-CM

## 2019-08-05 DIAGNOSIS — Z94.0 KIDNEY REPLACED BY TRANSPLANT: ICD-10-CM

## 2019-08-05 LAB
ALBUMIN SERPL BCP-MCNC: 4 G/DL (ref 3.5–5.2)
ANION GAP SERPL CALC-SCNC: 9 MMOL/L (ref 8–16)
BASOPHILS # BLD AUTO: 0.03 K/UL (ref 0–0.2)
BASOPHILS NFR BLD: 0.5 % (ref 0–1.9)
BUN SERPL-MCNC: 22 MG/DL (ref 8–23)
CALCIUM SERPL-MCNC: 9.8 MG/DL (ref 8.7–10.5)
CHLORIDE SERPL-SCNC: 106 MMOL/L (ref 95–110)
CO2 SERPL-SCNC: 25 MMOL/L (ref 23–29)
CREAT SERPL-MCNC: 1.2 MG/DL (ref 0.5–1.4)
DIFFERENTIAL METHOD: ABNORMAL
EOSINOPHIL # BLD AUTO: 0.3 K/UL (ref 0–0.5)
EOSINOPHIL NFR BLD: 4.7 % (ref 0–8)
ERYTHROCYTE [DISTWIDTH] IN BLOOD BY AUTOMATED COUNT: 13 % (ref 11.5–14.5)
EST. GFR  (AFRICAN AMERICAN): 56.4 ML/MIN/1.73 M^2
EST. GFR  (NON AFRICAN AMERICAN): 48.9 ML/MIN/1.73 M^2
GLUCOSE SERPL-MCNC: 183 MG/DL (ref 70–110)
HCT VFR BLD AUTO: 39.3 % (ref 37–48.5)
HGB BLD-MCNC: 12.6 G/DL (ref 12–16)
IMM GRANULOCYTES # BLD AUTO: 0.03 K/UL (ref 0–0.04)
IMM GRANULOCYTES NFR BLD AUTO: 0.5 % (ref 0–0.5)
LYMPHOCYTES # BLD AUTO: 0.7 K/UL (ref 1–4.8)
LYMPHOCYTES NFR BLD: 10.2 % (ref 18–48)
MAGNESIUM SERPL-MCNC: 1.6 MG/DL (ref 1.6–2.6)
MCH RBC QN AUTO: 31.3 PG (ref 27–31)
MCHC RBC AUTO-ENTMCNC: 32.1 G/DL (ref 32–36)
MCV RBC AUTO: 98 FL (ref 82–98)
MONOCYTES # BLD AUTO: 0.5 K/UL (ref 0.3–1)
MONOCYTES NFR BLD: 7 % (ref 4–15)
NEUTROPHILS # BLD AUTO: 5.1 K/UL (ref 1.8–7.7)
NEUTROPHILS NFR BLD: 77.1 % (ref 38–73)
NRBC BLD-RTO: 0 /100 WBC
PHOSPHATE SERPL-MCNC: 4.1 MG/DL (ref 2.7–4.5)
PLATELET # BLD AUTO: 203 K/UL (ref 150–350)
PMV BLD AUTO: 10.1 FL (ref 9.2–12.9)
POTASSIUM SERPL-SCNC: 4.4 MMOL/L (ref 3.5–5.1)
RBC # BLD AUTO: 4.03 M/UL (ref 4–5.4)
SODIUM SERPL-SCNC: 140 MMOL/L (ref 136–145)
WBC # BLD AUTO: 6.59 K/UL (ref 3.9–12.7)

## 2019-08-05 PROCEDURE — 80197 ASSAY OF TACROLIMUS: CPT

## 2019-08-05 PROCEDURE — 86352 CELL FUNCTION ASSAY W/STIM: CPT

## 2019-08-05 PROCEDURE — 80069 RENAL FUNCTION PANEL: CPT

## 2019-08-05 PROCEDURE — 87799 DETECT AGENT NOS DNA QUANT: CPT

## 2019-08-05 PROCEDURE — 85025 COMPLETE CBC W/AUTO DIFF WBC: CPT

## 2019-08-05 PROCEDURE — 36415 COLL VENOUS BLD VENIPUNCTURE: CPT | Mod: PO

## 2019-08-05 PROCEDURE — 83735 ASSAY OF MAGNESIUM: CPT

## 2019-08-06 DIAGNOSIS — Z94.0 KIDNEY REPLACED BY TRANSPLANT: Primary | ICD-10-CM

## 2019-08-06 DIAGNOSIS — Z00.6 EXAM FOR CLINICAL RESEARCH: ICD-10-CM

## 2019-08-06 LAB — TACROLIMUS BLD-MCNC: 7.1 NG/ML (ref 5–15)

## 2019-08-07 ENCOUNTER — PATIENT MESSAGE (OUTPATIENT)
Dept: TRANSPLANT | Facility: CLINIC | Age: 59
End: 2019-08-07

## 2019-08-07 ENCOUNTER — HOSPITAL ENCOUNTER (OUTPATIENT)
Dept: CARDIOLOGY | Facility: CLINIC | Age: 59
Discharge: HOME | End: 2019-08-07
Payer: MEDICARE

## 2019-08-07 ENCOUNTER — RESEARCH ENCOUNTER (OUTPATIENT)
Dept: RESEARCH | Facility: HOSPITAL | Age: 59
End: 2019-08-07
Payer: MEDICARE

## 2019-08-07 VITALS
SYSTOLIC BLOOD PRESSURE: 123 MMHG | BODY MASS INDEX: 32.49 KG/M2 | HEART RATE: 73 BPM | DIASTOLIC BLOOD PRESSURE: 73 MMHG | RESPIRATION RATE: 17 BRPM | WEIGHT: 207.44 LBS | TEMPERATURE: 98 F

## 2019-08-07 DIAGNOSIS — Z00.6 RESEARCH SUBJECT: ICD-10-CM

## 2019-08-07 DIAGNOSIS — M48.062 LUMBAR STENOSIS WITH NEUROGENIC CLAUDICATION: ICD-10-CM

## 2019-08-07 DIAGNOSIS — R26.81 GAIT INSTABILITY: ICD-10-CM

## 2019-08-07 DIAGNOSIS — Z94.0 KIDNEY TRANSPLANT RECIPIENT: Primary | ICD-10-CM

## 2019-08-07 DIAGNOSIS — B34.3 PARVOVIRUS B19 INFECTION: ICD-10-CM

## 2019-08-07 DIAGNOSIS — Z00.6 EXAM FOR CLINICAL RESEARCH: ICD-10-CM

## 2019-08-07 LAB — IMMUNKNOW (STIMULATED): 89 NG/ML (ref 226–524)

## 2019-08-07 PROCEDURE — 93010 ELECTROCARDIOGRAM REPORT: CPT | Mod: S$PBB,,, | Performed by: INTERNAL MEDICINE

## 2019-08-07 PROCEDURE — 93005 ELECTROCARDIOGRAM TRACING: CPT | Mod: PBBFAC | Performed by: INTERNAL MEDICINE

## 2019-08-07 PROCEDURE — 93010 EKG 12-LEAD: ICD-10-PCS | Mod: S$PBB,,, | Performed by: INTERNAL MEDICINE

## 2019-08-07 PROCEDURE — 99213 PR OFFICE/OUTPT VISIT, EST, LEVL III, 20-29 MIN: ICD-10-PCS | Mod: ,,, | Performed by: CLINICAL NURSE SPECIALIST

## 2019-08-07 PROCEDURE — 99213 OFFICE O/P EST LOW 20 MIN: CPT | Mod: ,,, | Performed by: CLINICAL NURSE SPECIALIST

## 2019-08-07 NOTE — PROGRESS NOTES
Subjective:      Patient ID: Andra Newell is a 59 y.o. female.    Chief Complaint:Research      History of Present Illness  [employer] CMV Prevention (Kidney Transplant)  Protocol: [id number]  IRB# 2017.512  PI: Nicholas GUAN)  Site: [id number]  Subject #[id number]     VISIT 13/ WEEK 28:          Date of Visit: 07AUG2019     Ms. Andra Newell is a 59 y.o. female with a past medical history of diabetes type 2, HTN, hyperlipidemia, arthritis, lumbar degenerative disc disease, lumbar stenosis, GERD, and ESRD who is s/p a living-donor kidney transplant (D+/R-) on 1/14/19. Ms. Newell seen today in clinic for research visit for [employer] CMV prevention study. Study medication was stopped in May when patient was diagnosed with Parvovirus. She continues to be seen for follow up.    Ms. Newell states she is overall feeling well. Parvovirus viral load 7/, anemia and leukopenia resolved. Patient states that fatigue and shortness of breath are improved. Reports her main complaint is back pain and gait instability/dizziness. She reports two falls in July, denies injuries from falls. She is being followed by neurology for history of lumbar stenosis and degenerative disc disease. States she may need surgery on her back in the future. She has tried massage to manage pain. She is seeing ENT today for assessment of dizziness. Denies fever, chills, n/v/d, shortness of breath, chest pain. Patient agrees to continue follow up in study.     Review of Systems   Constitution: Negative for chills and fever.   HENT: Negative for congestion and sore throat.    Eyes: Negative for blurred vision.   Cardiovascular: Negative for chest pain and leg swelling.   Respiratory: Negative for cough and shortness of breath.    Skin: Negative for rash.   Musculoskeletal: Positive for back pain and falls.   Gastrointestinal: Negative for abdominal pain, diarrhea, heartburn, nausea and vomiting.   Genitourinary: Negative for hematuria.    Neurological: Positive for dizziness and loss of balance.   Psychiatric/Behavioral: Negative for altered mental status.     Objective:   Physical Exam   Constitutional: She is oriented to person, place, and time. She appears well-developed and well-nourished. No distress.   HENT:   Head: Normocephalic and atraumatic.   Right Ear: External ear normal.   Left Ear: External ear normal.   Nose: Nose normal.   Eyes: Conjunctivae and EOM are normal. Right eye exhibits no discharge. Left eye exhibits no discharge.   Neck: Normal range of motion. No JVD present.   Cardiovascular: Normal rate and normal heart sounds.   Pulmonary/Chest: Effort normal. No respiratory distress. She has no wheezes.   Abdominal: Soft. She exhibits no distension. There is no tenderness.   Musculoskeletal: Normal range of motion. She exhibits no edema.   Neurological: She is alert and oriented to person, place, and time.   Skin: Skin is warm and dry.   Psychiatric: She has a normal mood and affect. Her behavior is normal.   Vitals reviewed.    Assessment:       1. Kidney transplant recipient    2. Research subject    3. Parvovirus B19 infection    4. Lumbar stenosis with neurogenic claudication    5. Gait instability          Plan:       1. Research labs drawn and processed  2. EKG per study protocol.  3. Questionnaires per study protocol  4. Return to ID research in 4 weeks with labs prior

## 2019-08-07 NOTE — PROGRESS NOTES
Protocol: [id number]  IRB# 2017.512  PI: Nicholas GUAN)  Site: [id number]  Subject #[id number]     VISIT 13 / WEEK 28       Date of Visit: 07Aug2019     Patient arrived at clinic to complete her V13/WK28 visit. Protocol labs were drawn at 0950.  All specimens were processed and shipped per protocol.  , Evette Yoder, conducted the visit in its entirety with Sub-I, Sharri Sands, present. Patient agrees to continue her participation in the study and all questions answered.  Patient stated she started [employer] and had two falls without injury (07/03/19 and 07/18/19).   These have been recorded in the source and EDC.  Patient denies any recent hospitalizations or other healthcare utilization; any past or current cold like symptoms, fever, or chronic diarrhea.  CMV Disease Assessment, Health Outcomes Assessment, Renal Transplant Outcomes, and Child Pierre Score completed. Subject agreed to continue to follow compliance in regards to effective birth control as required by protocol.    Vitals:    08/07/19 1010   BP: 123/73   BP Location: Right arm   Patient Position: Sitting   Pulse: 73   Resp: 17   Temp: 98.2 °F (36.8 °C)   TempSrc: Oral   Weight: 94.1 kg (207 lb 7.3 oz)         Current Outpatient Medications:     blood sugar diagnostic (BLOOD GLUCOSE TEST) Strp, Checks her bg 4 times a day   e11.9, Disp: 400 strip, Rfl: 4    insulin aspart U-100 (NOVOLOG FLEXPEN U-100 INSULIN) 100 unit/mL (3 mL) InPn pen, 24 units AC + correction scale. Max TDD: 92 units, Disp: 30 mL, Rfl: 12    insulin degludec (TRESIBA FLEXTOUCH U-200) 200 unit/mL (3 mL) InPn, Inject 80 Units into the skin every evening., Disp: 27 mL, Rfl: 12    levothyroxine (SYNTHROID) 75 MCG tablet, Take 1 tablet (75 mcg total) by mouth once daily., Disp: 30 tablet, Rfl: 4    LORazepam (ATIVAN) 2 MG Tab, Take 1 tablet (2 mg total) by mouth 2 (two) times daily., Disp: 60 tablet, Rfl: 1    magnesium oxide (MAG-OX) 400 mg (241.3 mg magnesium)  tablet, Take 2 tablets (800 mg total) by mouth once daily., Disp: 60 tablet, Rfl: 11    multivitamin (THERAGRAN) tablet, Take 1 tablet by mouth once daily., Disp: , Rfl:     mycophenolate (CELLCEPT) 250 mg Cap, Take 1 capsule (250 mg total) by mouth 2 (two) times daily., Disp: 60 capsule, Rfl: 11    oxybutynin (DITROPAN-XL) 10 MG 24 hr tablet, Take 1 tablet (10 mg total) by mouth once daily., Disp: 90 tablet, Rfl: 3    pantoprazole (PROTONIX) 40 MG tablet, Take 1 tablet (40 mg total) by mouth once daily., Disp: 30 tablet, Rfl: 11    rosuvastatin (CRESTOR) 10 MG tablet, Take 1 tablet (10 mg total) by mouth once daily., Disp: 90 tablet, Rfl: 1    sulfamethoxazole-trimethoprim 400-80mg (BACTRIM,SEPTRA) 400-80 mg per tablet, Take 1 tablet by mouth once daily., Disp: 30 tablet, Rfl: 6    tacrolimus (PROGRAF) 0.5 MG Cap, Take 1 capsule (0.5 mg total) by mouth every evening. Total 2mg in AM PO and 1.5mg PO in PM Z94.0, Disp: 30 capsule, Rfl: 11    tacrolimus (PROGRAF) 1 MG Cap, Take 2 capsules (2 mg total) by mouth every morning AND 1 capsule (1 mg total) every evening. Total 2mg in AM PO and 1.5mg in PM PO. Z94.0., Disp: 90 capsule, Rfl: 11    vilazodone (VIIBRYD) 40 mg Tab tablet, Take 1 tablet (40 mg total) by mouth once daily., Disp: 30 tablet, Rfl: 4    Current Facility-Administered Medications:     sodium chloride 0.9% bolus 2,000 mL, 2,000 mL, Intravenous, Once, Maureen Cabral MD     Physical exam performed by Sharri Sands.  EKG performed.  Patient questionnaires completed by patient on sponsor provided iPad.  Next appointment scheduled for 03Sep2019.

## 2019-08-12 LAB — B19V DNA # SPEC NAA+PROBE: 855 COPIES/ML

## 2019-08-12 NOTE — PROGRESS NOTES
Subjective:       Patient ID: Andra Newell is a 59 y.o. female.    Chief Complaint:  recurrent uti (follow up)      History of Present Illness  HPI     59-year-old female with history of DM/HTN/antibiotic induced nephropathy c/b ESRD living kidney transplant (friend) on 1/14/2019 with campath induction, now on MMR, tacro for maintenance, CMV D+/R- in [facility] CMV Prevention trial,  Referred by Dr. Millard for evaluation of recurrent UTI. She has urinary urgency,  Mixed incontinence, urge predominant.   Patient reports feeling bladder pressure and at times pain not associated with urination.  Denied any hematuria, dysuria, fevers, chills, sweats.  She has been treated with 3 courses of antibiotics, initially cefpodoxime 200 mg PO BID x5 days, then amox-clav 875-125 mg PO BID x7 days, currently on cipro 500 mg PO BID x 7 days.  She reports only minimal relief in symptoms during antibiotic course and after completing antibiotics.  All cultures have grown ESBL Klebsiella.  Urinary stent has been removed.  Denied having any adverse side effects related to antibiotics.  Denied any n/v/d, abdominal pain.  She denied having any history of recurrent UTIs prior to transplant.  I  nterval follow up :  She presented to the ED 4/1 with fevers Tmax of 101.2  Admit to [facility] for further work-up.    [facility]     Question 3/8/2019  9:54 AM CST - Filed by Patient on 3/8/2019   General Urogynecology: Are you experiencing the following?    Dysuria (painful urination) No   Nocturia:  waking up at night to empty your bladder  Yes   If you answered yes to the previous question, how many times does this happen per night? 1-2   Enuresis (urine loss during sleep) No   Dribbling urine after you urinate Yes   Hematuria (urine appears red) No   Type of stream Weak   Urinary frequency: How often a day are you going to the bathroom per day?  10-15   Urinary Tract Infections: How many Urinary Tract Infections have you had in the  "past year? Three (3) infections in a year   If you have had a UTI in the past year, what treatments have you had so far?  Other   Urinary Incontinence (General): Are you experiencing the following?    Past consultation for incontinence: Have you ever seen someone for the evaluation of incontinence? Yes   If you answered yes to the previous question, please select all the therapies you have tried.  None of the above   Please note the effectiveness of the therapies. Ineffective   Need to wear protection to keep clothes dry  No   If you answered yes to the previous question, please misa the protection you use.  None   If you wear protection, how much wetness is typically on each pad? N/a- I do not wear protection   If you wear protection, how often do you have to change per day, if applicable?  0   Stress Symptoms: Are you experiencing the following?    Leakage of urine with cough, laugh and/or sneeze Yes   If you answered yes to the previous question, what is the frequency in days, weeks and/or months? Never   Leakage of urine with sex No   Leakage of urine with bending/ lifting Yes   Leakage of urine with briskly walking or jogging No   If you lose urine for any other reason not previously mentioned, please note it below, if applicable.     Urge Symptoms: Are you experiencing the following?    Urgency ("got to go" feeling) Yes   Urge: How frequently do you feel an urge to urinate (feeling like you "gotta go" to the bathroom and can't wait) Several times a day   Do you experience a leakage of urine when you have a feeling of urgency?  No   Leakage of urine when unaware No   Past use of anticholinergics (medications used to treat overactive bladder) No   If you answered yes to the previous question, please misa the anticholinergics you have used:     Have you ever used Mirbetriq (aka Mirabegron)?  No   Prolapse Symptoms: Are you experiencing any of the following?     Falling out/ Bulging/ Heaviness in the vagina No "   Vaginal/ Abdominal Pain/ Pressure Yes   Need to strain/ Push to void Yes   Need to wait on the toilet before you void No   Unusual position to urinate (using your hands to push back the vaginal bulge) Yes   Sensation of incomplete emptying Yes   Past use of pessary device No   If you answered yes to the previous question, please list the devices you have used below.     Bowel Symptoms: Are you experiencing any of the following?    Constipation No   Diarrhea  No   Hematochezia (bloody stool) No   Incomplete evacuation of stool No   Involuntary loss of formed stool Yes   Fecal smearing/urgency No   Involuntary loss of gas No   Vaginal Symptoms: Are you experiencing any of the following?     Abnormal vaginal bleeding  No   Vaginal dryness No   Sexually active  No   Dyspareunia (painful intercourse) No   Estrogen use  No     GYN & OB History  Patient's last menstrual period was 2012.   Date of Last Pap: 2018    OB History    Para Term  AB Living   2 2 2     2   SAB TAB Ectopic Multiple Live Births           2      # Outcome Date GA Lbr Felice/2nd Weight Sex Delivery Anes PTL Lv   2 Term      Vag-Spont   YULISA   1 Term      Vag-Spont   YULISA     OB  Largest: 9  Forceps: yes   Episiotomy:  yes    GYN  Menarche: 11  Menstrual cycle: 48  Menopause: Yes  Hysterectomy: present   Ovaries: present     Past Medical History:   Diagnosis Date    Anemia     Anemia in chronic kidney disease, on chronic dialysis 2017    Arthritis     Asthma     allergic airway    CKD stage III (moderate) 2019    Colon polyp     Depression     Diabetes mellitus     Diverticulosis     Eosinophilia     ESRD (end stage renal disease)     stage V, due for living donor 2018    ESRD on dialysis     GERD (gastroesophageal reflux disease)     Gout     Gout     Hyperlipidemia     Hypertension     Low back pain     MRSA carrier     Obesity     Renal disorder     Rotator cuff syndrome--left     Thyroid  disease     Ulnar neuropathy of right upper extremity     Uncontrolled type 2 diabetes mellitus with hyperglycemia      Past Surgical History:   Procedure Laterality Date    ACHILLES TENDON SURGERY Left May 2015    ARTHROSCOPY, HIP Left 10/3/2013    [facility]    ARTHROSCOPY-HIP WITH TROCHANTERIC BURSECTOMY Left 10/3/2013    [facility]    ARTHROSCOPY-SHOULDER Left 11/24/2015    [facility]    ARTHROSCOPY-SHOULDER WITH SUBACROMIAL DECOMPRESSION Left 7/12/2016    [facility]    BACK SURGERY      CHOLECYSTECTOMY      COLONOSCOPY N/A 9/6/2017    [facility]    DEBRIDEMENT-SHOULDER-ARTHROSCOPIC Left 7/12/2016    [facility]    DEBRIDEMENT-SHOULDER-ARTHROSCOPIC; LABRAL Left 11/24/2015    [facility]    DIALYSIS FISTULA CREATION  12/2017    FEMOROPLASTY, ARTHROSCOPIC Left 10/3/2013    [facility]    HEMORRHOID SURGERY      INJECTION-AMNIOX Left 7/12/2016    [facility]    JOINT REPLACEMENT      left    KNEE SURGERY      LYSIS-ADHESION Left 11/24/2015    [facility]    REPAIR ROTATOR CUFF ARTHROSCOPIC Left 7/12/2016    [facility]    REPAIR-TENDON-BICEP Left 11/24/2015    [facility]    SHOULDER ARTHROSCOPY      SHOULDER SURGERY      TRANSPLANT, KIDNEY N/A 1/14/2019    [facility] OR [address] [place] [address]    UPPER GASTROINTESTINAL ENDOSCOPY  ~2013    Dr. Moralez     Review of patient's allergies indicates:   Allergen Reactions    Torsemide Diarrhea     Diarrhea stopped once discontinued med    Codeine Itching     Can take oxycodone and hydrocodone with Benadryl       Current Outpatient Medications:      ergocalciferol (ERGOCALCIFEROL) 50,000 unit Cap, Take 1 capsule (50,000 Units total) by mouth every 7 days. Take on Tues, Disp: 4 capsule, Rfl: 2    ertapenem sodium (ERTAPENEM, INVANZ, 1 G/100 ML NS, READY TO MIX,), Inject 100 mLs (1 g total) into the vein once daily. Stop: 4/15/19, Disp: , Rfl:     estradiol (ESTRACE) 0.01 % (0.1 mg/gram) vaginal cream, Place 0.5 g vaginally twice a week. Apply to the vagina daily for two weeks then twice a week., Disp: 45 g, Rfl: 4    famotidine (PEPCID) 20 MG tablet, Take 1 tablet (20 mg total) by mouth 2 (two) times daily., Disp: 60 tablet, Rfl: 11    flash glucose scanning reader (FREESTYLE DAKOTAH 14 DAY READER) Misc, Dispense 1 reader, Disp: 1 each, Rfl: 0    flash glucose sensor (FREESTYLE DAKOTAH 14 DAY SENSOR) Kit, Uses 1 kit monthly, Disp: 5 kit, Rfl: 3    insulin (BASAGLAR KWIKPEN U-100 INSULIN) glargine 100 units/mL (3mL) SubQ pen, Inject 30 Units into the skin every evening., Disp: 15 mL, Rfl: 0    insulin aspart U-100 (NOVOLOG FLEXPEN U-100 INSULIN) 100 unit/mL InPn pen, Novolog 10 u AC + correction Max TDD 40u, Disp: 15 mL, Rfl: 12    INV acyclovir/placebo capsule, Take 1 capsule (400 mg total) by mouth every 12 (twelve) hours.  For investigational use only. [id number]. Protocol: MK 8228-002, Disp: 68 capsule, Rfl: 0    INV MK-8228/placebo 480 mg oral tablet, Take 1 tablet (480 mg total) by mouth once daily. FOR INVESTIGATIONAL USE ONLY. Patient Study# [id number]. Protocol: MK 8228-002, Disp: 34 tablet, Rfl: 0    INV valganciclovir/placebo tablet, Take 2 tablets (900 mg total) by mouth once daily.  FOR INVESTIGATIONAL USE ONLY. [id number]. Protocol: MK 8228-002, Disp: 68 tablet, Rfl: 0    levothyroxine (SYNTHROID) 75 MCG tablet, TAKE ONE TABLET BY MOUTH ONCE DAILY, Disp: 30 tablet, Rfl: 4    LORazepam (ATIVAN) 2 MG Tab, Take 1 tablet (2 mg total) by mouth 2 (two) times daily. (Patient taking differently: Take 1 mg by mouth 2 (two) times daily. ), Disp: 60  "tablet, Rfl: 2    magnesium oxide (MAG-OX) 400 mg (241.3 mg magnesium) tablet, Take 2 tablets (800 mg total) by mouth once daily., Disp: 60 tablet, Rfl: 11    multivitamin (THERAGRAN) tablet, Take 1 tablet by mouth once daily., Disp: , Rfl:     pen needle, diabetic (BD ULTRA-FINE MINI PEN NEEDLE) 31 gauge x 3/16" Ndle, USES 4  DAILY, Disp: 400 each, Rfl: 5    rosuvastatin (CRESTOR) 10 MG tablet, Take 1 tablet (10 mg total) by mouth once daily., Disp: 90 tablet, Rfl: 1    VIIBRYD 40 mg Tab tablet, TAKE ONE TABLET BY MOUTH ONCE DAILY, Disp: 30 tablet, Rfl: 4    oxybutynin (DITROPAN-XL) 10 MG 24 hr tablet, Take 1 tablet (10 mg total) by mouth once daily., Disp: 30 tablet, Rfl: 11    tacrolimus (PROGRAF) 0.5 MG Cap, Take 3 capsules (1.5 mg total) by mouth every morning AND 3 capsules (1.5 mg total) every evening., Disp: 180 capsule, Rfl: 11  No current facility-administered medications for this visit.     Review of Systems  Review of Systems   Constitutional: Negative.  Negative for activity change, appetite change, chills, diaphoresis, fatigue, fever and unexpected weight change.   HENT: Negative.    Eyes: Negative.    Respiratory: Negative.  Negative for cough.    Cardiovascular: Negative.    Gastrointestinal: Positive for constipation. Negative for abdominal distention, abdominal pain, anal bleeding, blood in stool, diarrhea, nausea, rectal pain and vomiting.   Endocrine: Negative.    Genitourinary: Positive for dysuria and urgency. Negative for decreased urine volume, difficulty urinating, dyspareunia, enuresis, flank pain, frequency, genital sores, hematuria, menstrual problem, pelvic pain, vaginal bleeding, vaginal discharge and vaginal pain.   Musculoskeletal: Negative for back pain.   Skin: Negative for color change, pallor, rash and wound.   Allergic/Immunologic: Negative for environmental allergies, food allergies and immunocompromised state.   Hematological: Negative for adenopathy. Does not " bruise/bleed easily.   Psychiatric/Behavioral: Negative for agitation, behavioral problems, confusion and sleep disturbance.           Objective:     Physical Exam   Constitutional: She is oriented to person, place, and time. She appears well-developed.   HENT:   Head: Normocephalic and atraumatic.   Eyes: Conjunctivae and EOM are normal.   Neck: Normal range of motion. Neck supple.   Cardiovascular: Normal rate, regular rhythm, S1 normal, S2 normal, normal heart sounds and intact distal pulses.   Pulmonary/Chest: Effort normal and breath sounds normal. She exhibits no tenderness.   Abdominal: Soft. Bowel sounds are normal. She exhibits no distension and no mass. There is no hepatosplenomegaly, splenomegaly or hepatomegaly. There is no tenderness. There is no rigidity, no rebound, no guarding and no CVA tenderness. No hernia.       Genitourinary: Pelvic exam was performed with patient supine. Rectum normal, vagina normal, uterus normal, cervix normal, skenes normal and bartholins normal. Right labia normal and left labia normal. Urethra exhibits hypermobility. Urethra exhibits no urethral caruncle, no urethral diverticulum and no urethral mass. Right bartholin is not enlarged and not tender. Left bartholin is not enlarged and not tender. Rectal exam shows resting tone normal and active tone normal. Rectal exam shows no external hemorrhoid, no fissure, no tenderness, anal tone normal and no dovetailing. Guaiac negative stool. There is a cystocele and atrophy in the vagina. No foreign body, tenderness, bleeding, unspecified prolapse of vaginal walls, fistula, mesh exposure or lavator tenderness in the vagina. No vaginal discharge found. Right adnexum displays no mass and no tenderness. Left adnexum displays no mass and no tenderness. Cervix exhibits no motion tenderness and no discharge. Uterus is not tender and not experiencing uterine prolapse.   PVR: 60 ML  Empty cough stress test: Negative.  Kegel:  1/5    POP-Q  Aa: -1 Ba: -1 C: -4   GH: 3 PB: 2 TVL: 9   Ap: 0 Bp: 0 D: -5                     Musculoskeletal: Normal range of motion.   Lymphadenopathy:     She has no axillary adenopathy.        Right: No inguinal adenopathy present.        Left: No inguinal adenopathy present.   Neurological: She is alert and oriented to person, place, and time. She has normal strength and normal reflexes. Cranial nerves II through XII intact. No cranial nerve deficit.   Skin: Skin is warm, dry and intact.   Psychiatric: She has a normal mood and affect. Her speech is normal and behavior is normal. Judgment normal. Cognition and memory are normal.             HCM  Pap's:  No recent prior to surgery normal, get's them irregulariy   Mammo:  Normal   Colonoscopy: normal   Dexa: normal     CT 2/12/2019  1. Right lower quadrant renal allograft in place with appropriately positioned ureteral stent and no evidence of hydronephrosis.  Small adjacent peritransplant fluid/fluid collection similar to most recent ultrasound examination.  Small fluid collection within the subcutaneous soft tissues of the anterior abdominal wall as detailed above.  2. Findings in keeping with medical renal disease with bilateral renal and cortical thinning, renal cysts and subcentimeter renal hypodensities too small to definitively characterize.  3. Cholecystectomy, small hiatal hernia and diverticulosis without evidence to suggest diverticulitis.  4. Additional findings as detailed above.     Assessment:        1. Recurrent UTI    2. Mixed urge and stress incontinence    3. Urinary urgency               Plan:        1. Recurrent UTI:   --Managed by Dr. Kessler, spoke with Dr. Kessler today     2. Prolapse: Stage1 apical prolapse, Stage 2 posterior vaginal wall prolapse   --Daily pelvic floor exercises as assigned  --Non-surgical options discussed with patient      3.  Vaginal atrophy (dryness):  Use 1 gram of estrogen cream in vagina nightly x 2 weeks, then  twice a week thereafter.      4. Mixed urinary incontinence, urge> > stress:  --Bladder diary for 3-7 days, write the number of times you go to the rest room and associated symptoms of urgency or leakage.   --Empty bladder every 3 hours.  Empty well: wait a minute, lean forward on toilet.    --Avoid dietary irritants (see sheet).  Keep diary x 3-5 days to determine your irritants.  --KEGELS: do 10 in AM and 10 in PM, holding each x 10 seconds.  When you feel urge to go, STOP, KEGEL, and when urge has passed, then go to bathroom.    --URGE: Start Ditropan 10 mg daily.   SE profile reviewed.  Takes 2-4 weeks to see if will have effect.    --STRESS:  Pelvic floor PT  --Cystourethroscopy       Approximately 25 min were spent in consult, 90 % in discussion.     Jacqui Artis DO  Female Pelvic Medicine and Reconstructive Surgery  [facility] [place], [place]     yes

## 2019-08-13 ENCOUNTER — PATIENT MESSAGE (OUTPATIENT)
Dept: NEUROLOGY | Facility: CLINIC | Age: 59
End: 2019-08-13

## 2019-08-14 ENCOUNTER — TELEPHONE (OUTPATIENT)
Dept: ADMINISTRATIVE | Facility: HOSPITAL | Age: 59
End: 2019-08-14

## 2019-08-14 NOTE — TELEPHONE ENCOUNTER
Called Ms Newell and left a message on her voice mail. I would like to know what pain medications she has been using and if we can try stronger option. I also mentioned again that her ultimate therapeutic option is surgery as we sent a referral to spine surgeons on her last visit in clinic. I would like to chat again with her to see if she is willing to consider that option. I am also concern about any falls and possible head trauma and asked her to go to ED if she feels really sick and dizzy as she mentioned she feels pain might make her to pass out. I will wait for her to reach me back to decide on our options.    Krysten Metzger MD  Department of Neurology

## 2019-08-15 ENCOUNTER — PATIENT MESSAGE (OUTPATIENT)
Dept: NEUROLOGY | Facility: CLINIC | Age: 59
End: 2019-08-15

## 2019-08-15 ENCOUNTER — PATIENT MESSAGE (OUTPATIENT)
Dept: ENDOCRINOLOGY | Facility: CLINIC | Age: 59
End: 2019-08-15

## 2019-08-17 ENCOUNTER — PATIENT MESSAGE (OUTPATIENT)
Dept: TRANSPLANT | Facility: CLINIC | Age: 59
End: 2019-08-17

## 2019-08-19 ENCOUNTER — LAB VISIT (OUTPATIENT)
Dept: LAB | Facility: HOSPITAL | Age: 59
End: 2019-08-19
Attending: INTERNAL MEDICINE
Payer: MEDICARE

## 2019-08-19 ENCOUNTER — TELEPHONE (OUTPATIENT)
Dept: NEUROLOGY | Facility: CLINIC | Age: 59
End: 2019-08-19

## 2019-08-19 DIAGNOSIS — Z94.0 KIDNEY REPLACED BY TRANSPLANT: ICD-10-CM

## 2019-08-19 DIAGNOSIS — Z20.828 PARVOVIRUS EXPOSURE: ICD-10-CM

## 2019-08-19 LAB
ALBUMIN SERPL BCP-MCNC: 3.8 G/DL (ref 3.5–5.2)
ANION GAP SERPL CALC-SCNC: 13 MMOL/L (ref 8–16)
BASOPHILS # BLD AUTO: 0.04 K/UL (ref 0–0.2)
BASOPHILS NFR BLD: 0.6 % (ref 0–1.9)
BUN SERPL-MCNC: 25 MG/DL (ref 8–23)
CALCIUM SERPL-MCNC: 9.8 MG/DL (ref 8.7–10.5)
CHLORIDE SERPL-SCNC: 105 MMOL/L (ref 95–110)
CO2 SERPL-SCNC: 21 MMOL/L (ref 23–29)
CREAT SERPL-MCNC: 1.3 MG/DL (ref 0.5–1.4)
DIFFERENTIAL METHOD: ABNORMAL
EOSINOPHIL # BLD AUTO: 0.3 K/UL (ref 0–0.5)
EOSINOPHIL NFR BLD: 4.6 % (ref 0–8)
ERYTHROCYTE [DISTWIDTH] IN BLOOD BY AUTOMATED COUNT: 12.5 % (ref 11.5–14.5)
EST. GFR  (AFRICAN AMERICAN): 51.2 ML/MIN/1.73 M^2
EST. GFR  (NON AFRICAN AMERICAN): 44.4 ML/MIN/1.73 M^2
GLUCOSE SERPL-MCNC: 206 MG/DL (ref 70–110)
HCT VFR BLD AUTO: 38.2 % (ref 37–48.5)
HGB BLD-MCNC: 12.4 G/DL (ref 12–16)
IMM GRANULOCYTES # BLD AUTO: 0.02 K/UL (ref 0–0.04)
IMM GRANULOCYTES NFR BLD AUTO: 0.3 % (ref 0–0.5)
LYMPHOCYTES # BLD AUTO: 0.6 K/UL (ref 1–4.8)
LYMPHOCYTES NFR BLD: 9.8 % (ref 18–48)
MAGNESIUM SERPL-MCNC: 1.5 MG/DL (ref 1.6–2.6)
MCH RBC QN AUTO: 31.4 PG (ref 27–31)
MCHC RBC AUTO-ENTMCNC: 32.5 G/DL (ref 32–36)
MCV RBC AUTO: 97 FL (ref 82–98)
MONOCYTES # BLD AUTO: 0.5 K/UL (ref 0.3–1)
MONOCYTES NFR BLD: 7.2 % (ref 4–15)
NEUTROPHILS # BLD AUTO: 5.1 K/UL (ref 1.8–7.7)
NEUTROPHILS NFR BLD: 77.5 % (ref 38–73)
NRBC BLD-RTO: 0 /100 WBC
PHOSPHATE SERPL-MCNC: 3.9 MG/DL (ref 2.7–4.5)
PLATELET # BLD AUTO: 182 K/UL (ref 150–350)
PMV BLD AUTO: 10 FL (ref 9.2–12.9)
POTASSIUM SERPL-SCNC: 4.6 MMOL/L (ref 3.5–5.1)
RBC # BLD AUTO: 3.95 M/UL (ref 4–5.4)
SODIUM SERPL-SCNC: 139 MMOL/L (ref 136–145)
WBC # BLD AUTO: 6.55 K/UL (ref 3.9–12.7)

## 2019-08-19 PROCEDURE — 83735 ASSAY OF MAGNESIUM: CPT

## 2019-08-19 PROCEDURE — 80197 ASSAY OF TACROLIMUS: CPT

## 2019-08-19 PROCEDURE — 85025 COMPLETE CBC W/AUTO DIFF WBC: CPT

## 2019-08-19 PROCEDURE — 87799 DETECT AGENT NOS DNA QUANT: CPT

## 2019-08-19 PROCEDURE — 80069 RENAL FUNCTION PANEL: CPT

## 2019-08-19 PROCEDURE — 36415 COLL VENOUS BLD VENIPUNCTURE: CPT | Mod: PO

## 2019-08-19 PROCEDURE — 86352 CELL FUNCTION ASSAY W/STIM: CPT

## 2019-08-20 ENCOUNTER — TELEPHONE (OUTPATIENT)
Dept: NEUROLOGY | Facility: CLINIC | Age: 59
End: 2019-08-20

## 2019-08-20 LAB — TACROLIMUS BLD-MCNC: 8.5 NG/ML (ref 5–15)

## 2019-08-20 NOTE — TELEPHONE ENCOUNTER
----- Message from Timothy Nesbitt sent at 8/19/2019  4:34 PM CDT -----  Patient Returning Call from [facility]    Who Left Message for Patient: [place]  Communication Preference: [phone]  Additional Information:

## 2019-08-21 ENCOUNTER — PATIENT MESSAGE (OUTPATIENT)
Dept: TRANSPLANT | Facility: CLINIC | Age: 59
End: 2019-08-21

## 2019-08-21 ENCOUNTER — OFFICE VISIT (OUTPATIENT)
Dept: SPINE | Facility: CLINIC | Age: 59
End: 2019-08-21
Payer: MEDICARE

## 2019-08-21 VITALS
WEIGHT: 208.31 LBS | BODY MASS INDEX: 32.7 KG/M2 | HEART RATE: 82 BPM | HEIGHT: 67 IN | DIASTOLIC BLOOD PRESSURE: 74 MMHG | SYSTOLIC BLOOD PRESSURE: 126 MMHG

## 2019-08-21 DIAGNOSIS — M54.6 ACUTE RIGHT-SIDED THORACIC BACK PAIN: Primary | ICD-10-CM

## 2019-08-21 DIAGNOSIS — M54.50 ACUTE BILATERAL LOW BACK PAIN WITHOUT SCIATICA: ICD-10-CM

## 2019-08-21 LAB — IMMUNKNOW (STIMULATED): 333 NG/ML (ref 226–524)

## 2019-08-21 PROCEDURE — 99204 PR OFFICE/OUTPT VISIT, NEW, LEVL IV, 45-59 MIN: ICD-10-PCS | Mod: S$GLB,,, | Performed by: PHYSICIAN ASSISTANT

## 2019-08-21 PROCEDURE — 99999 PR PBB SHADOW E&M-EST. PATIENT-LVL IV: ICD-10-PCS | Mod: PBBFAC,,, | Performed by: PHYSICIAN ASSISTANT

## 2019-08-21 PROCEDURE — 3078F PR MOST RECENT DIASTOLIC BLOOD PRESSURE < 80 MM HG: ICD-10-PCS | Mod: CPTII,S$GLB,, | Performed by: PHYSICIAN ASSISTANT

## 2019-08-21 PROCEDURE — 3008F PR BODY MASS INDEX (BMI) DOCUMENTED: ICD-10-PCS | Mod: CPTII,S$GLB,, | Performed by: PHYSICIAN ASSISTANT

## 2019-08-21 PROCEDURE — 3074F PR MOST RECENT SYSTOLIC BLOOD PRESSURE < 130 MM HG: ICD-10-PCS | Mod: CPTII,S$GLB,, | Performed by: PHYSICIAN ASSISTANT

## 2019-08-21 PROCEDURE — 3008F BODY MASS INDEX DOCD: CPT | Mod: CPTII,S$GLB,, | Performed by: PHYSICIAN ASSISTANT

## 2019-08-21 PROCEDURE — 99999 PR PBB SHADOW E&M-EST. PATIENT-LVL IV: CPT | Mod: PBBFAC,,, | Performed by: PHYSICIAN ASSISTANT

## 2019-08-21 PROCEDURE — 3074F SYST BP LT 130 MM HG: CPT | Mod: CPTII,S$GLB,, | Performed by: PHYSICIAN ASSISTANT

## 2019-08-21 PROCEDURE — 3078F DIAST BP <80 MM HG: CPT | Mod: CPTII,S$GLB,, | Performed by: PHYSICIAN ASSISTANT

## 2019-08-21 PROCEDURE — 99204 OFFICE O/P NEW MOD 45 MIN: CPT | Mod: S$GLB,,, | Performed by: PHYSICIAN ASSISTANT

## 2019-08-21 RX ORDER — TIZANIDINE 4 MG/1
4 TABLET ORAL NIGHTLY PRN
Qty: 40 TABLET | Refills: 0 | Status: SHIPPED | OUTPATIENT
Start: 2019-08-21 | End: 2019-08-27 | Stop reason: ALTCHOICE

## 2019-08-21 NOTE — PROGRESS NOTES
Back and Spine New Patient    Patient ID: Andra Newell is a 59 y.o. female.    Chief Complaint   Patient presents with    Low-back Pain     She has had low back pain since January 2019. Pain has progressively worse. Pain is constant. She has spasms on the right side of her back. Any movement brings on spasms. Nothing helps. Sometimes pain radiates down both legs. She gets massive abdifatah horses in the calves of both legs.       Review of Systems   Constitutional: Negative for activity change, appetite change, chills, fever and unexpected weight change.   HENT: Negative for tinnitus, trouble swallowing and voice change.    Respiratory: Negative for apnea, cough, chest tightness and shortness of breath.    Cardiovascular: Negative for chest pain and palpitations.   Gastrointestinal: Negative for constipation, diarrhea, nausea and vomiting.   Genitourinary: Negative for difficulty urinating, dysuria, frequency and urgency.   Musculoskeletal: Positive for arthralgias, back pain and myalgias. Negative for gait problem, neck pain and neck stiffness.   Skin: Negative for wound.   Neurological: Negative for dizziness, tremors, seizures, facial asymmetry, speech difficulty, weakness, light-headedness, numbness and headaches.   Psychiatric/Behavioral: Negative for confusion and decreased concentration.       Past Medical History:   Diagnosis Date    Anemia     Anemia in chronic kidney disease, on chronic dialysis 7/12/2017    Aplastic anemia with parvovirus B19 infection 5/27/2019    Arthritis     Asthma     allergic airway    CKD stage III (moderate) 2/18/2019    Colon polyp     Depression     Diabetes mellitus     Diverticulosis     Eosinophilia     ESRD (end stage renal disease)     stage V, due for living donor 9/2018    ESRD on dialysis     GERD (gastroesophageal reflux disease)     Gout     Gout     Hyperlipidemia     Hypertension     Hypertriglyceridemia without hypercholesterolemia 6/9/2019     Low back pain     Low HDL (under 40) 6/9/2019    MRSA carrier     Neutropenia, unspecified 5/8/2019    Obesity     Renal disorder     Rotator cuff syndrome--left     Thyroid disease     Ulnar neuropathy of right upper extremity     Uncontrolled type 2 diabetes mellitus with hyperglycemia      Social History     Socioeconomic History    Marital status: Significant Other     Spouse name: Not on file    Number of children: 2    Years of education: Not on file    Highest education level: Not on file   Occupational History    Occupation: retired     Comment:    Social Needs    Financial resource strain: Not on file    Food insecurity:     Worry: Not on file     Inability: Not on file    Transportation needs:     Medical: Not on file     Non-medical: Not on file   Tobacco Use    Smoking status: Never Smoker    Smokeless tobacco: Never Used   Substance and Sexual Activity    Alcohol use: No    Drug use: No    Sexual activity: Never   Lifestyle    Physical activity:     Days per week: Not on file     Minutes per session: Not on file    Stress: Not on file   Relationships    Social connections:     Talks on phone: Not on file     Gets together: Not on file     Attends [employer] service: Not on file     Active member of club or organization: Not on file     Attends meetings of clubs or organizations: Not on file     Relationship status: Not on file   Other Topics Concern    Not on file   Social History Narrative    Not on file     Family History   Problem Relation Age of Onset    Diabetes Mother     Alzheimer's disease Mother     Thyroid disease Mother     Hyperlipidemia Mother     Heart disease Father     Stroke Father     Diabetes Sister     Lupus Sister     Ovarian cancer Sister     Heart disease Brother     No Known Problems Son     Diabetes Maternal Grandmother     Hypertension Maternal Grandmother     No Known Problems Paternal Grandmother     No Known  Problems Paternal Grandfather     COPD Maternal Aunt     Diabetes Maternal Aunt     Heart disease Maternal Aunt     Hypertension Maternal Aunt     No Known Problems Maternal Uncle     No Known Problems Paternal Aunt     No Known Problems Paternal Uncle     Colon cancer Neg Hx     Colon polyps Neg Hx     Crohn's disease Neg Hx     Ulcerative colitis Neg Hx     Celiac disease Neg Hx      Review of patient's allergies indicates:   Allergen Reactions    Torsemide Diarrhea     Diarrhea stopped once discontinued med    Codeine Itching     Can take oxycodone and hydrocodone with Benadryl       Current Outpatient Medications:     blood sugar diagnostic (BLOOD GLUCOSE TEST) Strp, Checks her bg 4 times a day   e11.9, Disp: 400 strip, Rfl: 4    insulin aspart U-100 (NOVOLOG FLEXPEN U-100 INSULIN) 100 unit/mL (3 mL) InPn pen, 24 units AC + correction scale. Max TDD: 92 units, Disp: 30 mL, Rfl: 12    insulin degludec (TRESIBA FLEXTOUCH U-200) 200 unit/mL (3 mL) InPn, Inject 80 Units into the skin every evening., Disp: 27 mL, Rfl: 12    levothyroxine (SYNTHROID) 75 MCG tablet, Take 1 tablet (75 mcg total) by mouth once daily., Disp: 30 tablet, Rfl: 4    LORazepam (ATIVAN) 2 MG Tab, Take 1 tablet (2 mg total) by mouth 2 (two) times daily., Disp: 60 tablet, Rfl: 1    magnesium oxide (MAG-OX) 400 mg (241.3 mg magnesium) tablet, Take 2 tablets (800 mg total) by mouth once daily., Disp: 60 tablet, Rfl: 11    multivitamin (THERAGRAN) tablet, Take 1 tablet by mouth once daily., Disp: , Rfl:     mycophenolate (CELLCEPT) 250 mg Cap, Take 1 capsule (250 mg total) by mouth 2 (two) times daily., Disp: 60 capsule, Rfl: 11    oxybutynin (DITROPAN-XL) 10 MG 24 hr tablet, Take 1 tablet (10 mg total) by mouth once daily., Disp: 90 tablet, Rfl: 3    pantoprazole (PROTONIX) 40 MG tablet, Take 1 tablet (40 mg total) by mouth once daily., Disp: 30 tablet, Rfl: 11    rosuvastatin (CRESTOR) 10 MG tablet, Take 1 tablet (10 mg  "total) by mouth once daily., Disp: 90 tablet, Rfl: 1    sulfamethoxazole-trimethoprim 400-80mg (BACTRIM,SEPTRA) 400-80 mg per tablet, Take 1 tablet by mouth once daily., Disp: 30 tablet, Rfl: 6    tacrolimus (PROGRAF) 0.5 MG Cap, Take 1 capsule (0.5 mg total) by mouth every evening. Total 2mg in AM PO and 1.5mg PO in PM Z94.0, Disp: 30 capsule, Rfl: 11    tacrolimus (PROGRAF) 1 MG Cap, Take 2 capsules (2 mg total) by mouth every morning AND 1 capsule (1 mg total) every evening. Total 2mg in AM PO and 1.5mg in PM PO. Z94.0., Disp: 90 capsule, Rfl: 11    vilazodone (VIIBRYD) 40 mg Tab tablet, Take 1 tablet (40 mg total) by mouth once daily., Disp: 30 tablet, Rfl: 4    tiZANidine (ZANAFLEX) 4 MG tablet, Take 1 tablet (4 mg total) by mouth nightly as needed (muscle spasms/ pain)., Disp: 40 tablet, Rfl: 0    Current Facility-Administered Medications:     sodium chloride 0.9% bolus 2,000 mL, 2,000 mL, Intravenous, Once, Maureen Cabral MD    Vitals:    08/21/19 1023   BP: 126/74   BP Location: Right arm   Patient Position: Sitting   BP Method: Large (Automatic)   Pulse: 82   Weight: 94.5 kg (208 lb 5.4 oz)   Height: 5' 7" (1.702 m)       Physical Exam   Constitutional: She is oriented to person, place, and time. She appears well-developed and well-nourished.   HENT:   Head: Normocephalic and atraumatic.   Eyes: Pupils are equal, round, and reactive to light.   Neck: Normal range of motion. Neck supple.   Cardiovascular: Normal rate.   Pulmonary/Chest: Effort normal.   Musculoskeletal: Normal range of motion. She exhibits no edema.   Neurological: She is alert and oriented to person, place, and time. She has a normal Finger-Nose-Finger Test, a normal Heel to Shin Test, a normal Romberg Test and a normal Tandem Gait Test. Gait normal.   Reflex Scores:       Tricep reflexes are 0 on the right side and 0 on the left side.       Bicep reflexes are 0 on the right side and 0 on the left side.       Brachioradialis " reflexes are 0 on the right side and 0 on the left side.       Patellar reflexes are 0 on the right side and 0 on the left side.       Achilles reflexes are 0 on the right side and 0 on the left side.  Skin: Skin is warm, dry and intact.   Psychiatric: She has a normal mood and affect. Her speech is normal and behavior is normal. Judgment and thought content normal.   Nursing note and vitals reviewed.      Neurologic Exam     Mental Status   Oriented to person, place, and time.   Oriented to person.   Oriented to place.   Oriented to time.   Follows 3 step commands.   Attention: normal. Concentration: normal.   Speech: speech is normal   Level of consciousness: alert  Knowledge: consistent with education.   Able to name object. Able to read. Able to repeat. Able to write. Normal comprehension.     Cranial Nerves     CN II   Visual acuity: normal  Right visual field deficit: none  Left visual field deficit: none     CN III, IV, VI   Pupils are equal, round, and reactive to light.  Right pupil: Size: 3 mm. Shape: regular. Reactivity: brisk. Consensual response: intact.   Left pupil: Size: 3 mm. Shape: regular. Reactivity: brisk. Consensual response: intact.   CN III: no CN III palsy  CN VI: no CN VI palsy  Nystagmus: none   Diplopia: none  Ophthalmoparesis: none  Conjugate gaze: present    CN V   Right facial sensation deficit: none  Left facial sensation deficit: none    CN VII   Right facial weakness: none  Left facial weakness: none    CN VIII   Hearing: intact    CN IX, X   CN IX normal.   CN X normal.     CN XI   Right sternocleidomastoid strength: normal  Left sternocleidomastoid strength: normal  Right trapezius strength: normal  Left trapezius strength: normal    CN XII   Fasciculations: absent  Tongue deviation: none    Motor Exam   Muscle bulk: normal  Overall muscle tone: normal  Right arm pronator drift: absent  Left arm pronator drift: absent    Strength   Right neck flexion: 5/5  Left neck flexion:  5/5  Right neck extension: 5/5  Left neck extension: 5/5  Right deltoid: 5/5  Left deltoid: 5  Right biceps: 5  Left biceps: 5  Right triceps: 5  Left triceps: 55  Right wrist flexion: 5  Left wrist flexion: 55  Right wrist extension: 5  Left wrist extension:   Right interossei: 5  Left interossei:   Right abdominals: 5  Left abdominals:   Right iliopsoas:   Left iliopsoas:   Right quadriceps:   Left quadriceps:   Right hamstrin/5  Left hamstrin/5  Right glutei:   Left glutei:   Right anterior tibial:   Left anterior tibial:   Right posterior tibial:   Left posterior tibial:   Right peroneal:   Left peroneal:   Right gastroc:   Left gastroc:     Sensory Exam   Right arm light touch: normal  Left arm light touch: normal  Right leg light touch: normal  Left leg light touch: normal  Right arm vibration: normal  Left arm vibration: normal  Right arm pinprick: normal  Left arm pinprick: normal    Gait, Coordination, and Reflexes     Gait  Gait: normal    Coordination   Romberg: negative  Finger to nose coordination: normal  Heel to shin coordination: normal  Tandem walking coordination: normal    Tremor   Resting tremor: absent  Intention tremor: absent  Action tremor: absent    Reflexes   Right brachioradialis: 0  Left brachioradialis: 0  Right biceps: 0  Left biceps: 0  Right triceps: 0  Left triceps: 0  Right patellar: 0  Left patellar: 0  Right achilles: 0  Left achilles: 0  Right Guzman: absent  Left Guzman: absent  Right ankle clonus: absent  Left ankle clonus: absent      Provider dictation:  59 year old female with ESRD s/p kidney transplant and maintained on diaylysis, anemia, arthritis, asthma, diabetes, depression, hypertension and obesity is referred by Dr. Metzger for evaluation of back pain.  She has had 3 left knee surgeries and 3 lumbar surgeries with her last surgery 8 years ago.  She felt well after lumbar surgeries.  After  undergoing kidney transplant in January 2019 she developed pain in the right parathoracic region from mid to lower thoracic.  This reagion of pain is described as a spasm.  In March/ April 2019 she developed soreness across the lower lumbar region.  She denies any radicular pain into the chest wall or lower extremities.  She does have intermittent muscle spasms in the calves.  She has tried massage.  She has not had PT or injections.  Oswestry score: 70%.  PHQ:  4.    On exam, she has subtle muscle stretch reflexes throughout the upper and lower extremities.  She has 5/5 strength and no sensory deficits.    I have reviewed an MRI lumbar spine from 4-4-19 demonstrating mulit level degenerative disk dessication particularly at L3/4 and L4/5 where endplate changes are present.  Post op changes of left hemilaminectomy are seen.  Overall degenerative changes thorughout the spine with moderate central canal narrowing at L1/2 and mild bilateral foraminal narrowing at all levels.    Ms. Silverman has acute onset right sided para spinal pain in the mid to lower thoracic region that is most likely myofascial and can be related to limited activity after kidney surgery and being overweight.  Lower lumbar pain without radiculopathy can be associated with degenerative disk changes as endplate inflammatory changes at L3/4, L4/5 as well the surgical recovery and weight.  We discussed the importance of exercise and maintainging good posture.  I have referred her to PT.  We discusse referral to pain management for interventional procedures, but she declined.  Follow up in 6-8 weeks to monitor progress.  I have also presrcibed zanaflex to help with spasms.          Visit Diagnosis:  Acute right-sided thoracic back pain  -     Ambulatory Referral to Physical/Occupational Therapy  -     tiZANidine (ZANAFLEX) 4 MG tablet; Take 1 tablet (4 mg total) by mouth nightly as needed (muscle spasms/ pain).  Dispense: 40 tablet; Refill: 0    Acute  bilateral low back pain without sciatica  -     Ambulatory Referral to Physical/Occupational Therapy  -     tiZANidine (ZANAFLEX) 4 MG tablet; Take 1 tablet (4 mg total) by mouth nightly as needed (muscle spasms/ pain).  Dispense: 40 tablet; Refill: 0        Total time spent counseling greater than fifty percent of total visit time.  Counseling included discussion regarding imaging findings, diagnosis possibilities, treatment options, risks and benefits.   The patient had many questions regarding the options and long-term effects.              done

## 2019-08-21 NOTE — LETTER
August 21, 2019      Krysten Metzger MD  [facility] [address]           [facility]  [address] [facility] [address]  [facility] [address]  Phone: [phone]  Fax: [phone]          Patient: Andra Newell   MR Number: [id number]   YOB: 1960   Date of Visit: 8/21/2019       Dear Dr. Krysten Metzger:    Thank you for referring Andra Newell to me for evaluation. Attached you will find relevant portions of my assessment and plan of care.    If you have questions, please do not hesitate to call me. I look forward to following Andra Newell along with you.    Sincerely,    Maite Alfonso PA-C    Enclosure  CC:  No Recipients    If you would like to receive this communication electronically, please contact [email or web address] or [phone] to request more information on [facility] access.    For providers and/or their staff who would like to refer a patient to [facility], please contact us through our one-stop-shop provider referral line, [facility], at [phone].    If you feel you have received this communication in error or would no longer like to receive these types of communications, please e-mail [email or web address]

## 2019-08-22 ENCOUNTER — TELEPHONE (OUTPATIENT)
Dept: SPINE | Facility: CLINIC | Age: 59
End: 2019-08-22

## 2019-08-22 LAB — B19V DNA # SPEC NAA+PROBE: 407 COPIES/ML

## 2019-08-22 NOTE — TELEPHONE ENCOUNTER
----- Message from Maite Alfonso PA-C sent at 8/21/2019 11:58 AM CDT -----  Please schedule follow up for 6-8 weeks in clinic.

## 2019-08-22 NOTE — TELEPHONE ENCOUNTER
Spoke with patient and scheduled her an appointment to see Maite Alfonso 10-4-19, she indicated understanding.

## 2019-08-27 ENCOUNTER — PATIENT MESSAGE (OUTPATIENT)
Dept: SPINE | Facility: CLINIC | Age: 59
End: 2019-08-27

## 2019-08-27 ENCOUNTER — PATIENT MESSAGE (OUTPATIENT)
Dept: TRANSPLANT | Facility: CLINIC | Age: 59
End: 2019-08-27

## 2019-08-27 DIAGNOSIS — M54.50 ACUTE BILATERAL LOW BACK PAIN WITHOUT SCIATICA: ICD-10-CM

## 2019-08-27 DIAGNOSIS — M54.6 ACUTE RIGHT-SIDED THORACIC BACK PAIN: Primary | ICD-10-CM

## 2019-08-27 RX ORDER — CYCLOBENZAPRINE HCL 10 MG
10 TABLET ORAL EVERY 8 HOURS PRN
Qty: 40 TABLET | Refills: 0 | Status: SHIPPED | OUTPATIENT
Start: 2019-08-27 | End: 2019-08-28 | Stop reason: ALTCHOICE

## 2019-08-28 ENCOUNTER — TELEPHONE (OUTPATIENT)
Dept: SPINE | Facility: CLINIC | Age: 59
End: 2019-08-28

## 2019-08-28 ENCOUNTER — PATIENT MESSAGE (OUTPATIENT)
Dept: SPINE | Facility: CLINIC | Age: 59
End: 2019-08-28

## 2019-08-28 ENCOUNTER — HOSPITAL ENCOUNTER (EMERGENCY)
Facility: HOSPITAL | Age: 59
Discharge: HOME | End: 2019-08-28
Attending: EMERGENCY MEDICINE
Payer: MEDICARE

## 2019-08-28 ENCOUNTER — PATIENT MESSAGE (OUTPATIENT)
Dept: TRANSPLANT | Facility: CLINIC | Age: 59
End: 2019-08-28

## 2019-08-28 VITALS
OXYGEN SATURATION: 96 % | WEIGHT: 208 LBS | RESPIRATION RATE: 19 BRPM | DIASTOLIC BLOOD PRESSURE: 67 MMHG | HEIGHT: 67 IN | TEMPERATURE: 98 F | HEART RATE: 72 BPM | SYSTOLIC BLOOD PRESSURE: 146 MMHG | BODY MASS INDEX: 32.65 KG/M2

## 2019-08-28 DIAGNOSIS — M54.50 ACUTE RIGHT-SIDED LOW BACK PAIN WITHOUT SCIATICA: ICD-10-CM

## 2019-08-28 DIAGNOSIS — M54.6 ACUTE RIGHT-SIDED THORACIC BACK PAIN: Primary | ICD-10-CM

## 2019-08-28 DIAGNOSIS — S39.012A STRAIN OF LUMBAR REGION, INITIAL ENCOUNTER: Primary | ICD-10-CM

## 2019-08-28 DIAGNOSIS — M54.50 ACUTE BILATERAL LOW BACK PAIN WITHOUT SCIATICA: ICD-10-CM

## 2019-08-28 LAB
ALBUMIN SERPL BCP-MCNC: 4.2 G/DL (ref 3.5–5.2)
ALP SERPL-CCNC: 131 U/L (ref 55–135)
ALT SERPL W/O P-5'-P-CCNC: 30 U/L (ref 10–44)
ANION GAP SERPL CALC-SCNC: 9 MMOL/L (ref 8–16)
AST SERPL-CCNC: 26 U/L (ref 10–40)
BACTERIA #/AREA URNS HPF: ABNORMAL /HPF
BASOPHILS # BLD AUTO: 0.04 K/UL (ref 0–0.2)
BASOPHILS NFR BLD: 0.4 % (ref 0–1.9)
BILIRUB SERPL-MCNC: 0.4 MG/DL (ref 0.1–1)
BILIRUB UR QL STRIP: NEGATIVE
BUN SERPL-MCNC: 20 MG/DL (ref 8–23)
CALCIUM SERPL-MCNC: 9.9 MG/DL (ref 8.7–10.5)
CHLORIDE SERPL-SCNC: 107 MMOL/L (ref 95–110)
CLARITY UR: CLEAR
CO2 SERPL-SCNC: 26 MMOL/L (ref 23–29)
COLOR UR: YELLOW
CREAT SERPL-MCNC: 1.2 MG/DL (ref 0.5–1.4)
DIFFERENTIAL METHOD: ABNORMAL
EOSINOPHIL # BLD AUTO: 0.3 K/UL (ref 0–0.5)
EOSINOPHIL NFR BLD: 3 % (ref 0–8)
ERYTHROCYTE [DISTWIDTH] IN BLOOD BY AUTOMATED COUNT: 12.7 % (ref 11.5–14.5)
EST. GFR  (AFRICAN AMERICAN): 56 ML/MIN/1.73 M^2
EST. GFR  (NON AFRICAN AMERICAN): 49 ML/MIN/1.73 M^2
GLUCOSE SERPL-MCNC: 127 MG/DL (ref 70–110)
GLUCOSE UR QL STRIP: NEGATIVE
HCT VFR BLD AUTO: 37.4 % (ref 37–48.5)
HGB BLD-MCNC: 12.4 G/DL (ref 12–16)
HGB UR QL STRIP: NEGATIVE
IMM GRANULOCYTES # BLD AUTO: 0.05 K/UL (ref 0–0.04)
KETONES UR QL STRIP: NEGATIVE
LEUKOCYTE ESTERASE UR QL STRIP: ABNORMAL
LYMPHOCYTES # BLD AUTO: 0.8 K/UL (ref 1–4.8)
LYMPHOCYTES NFR BLD: 8 % (ref 18–48)
MCH RBC QN AUTO: 31 PG (ref 27–31)
MCHC RBC AUTO-ENTMCNC: 33.2 G/DL (ref 32–36)
MCV RBC AUTO: 94 FL (ref 82–98)
MICROSCOPIC COMMENT: ABNORMAL
MONOCYTES # BLD AUTO: 0.6 K/UL (ref 0.3–1)
MONOCYTES NFR BLD: 6.6 % (ref 4–15)
NEUTROPHILS # BLD AUTO: 8 K/UL (ref 1.8–7.7)
NEUTROPHILS NFR BLD: 81.5 % (ref 38–73)
NITRITE UR QL STRIP: NEGATIVE
NRBC BLD-RTO: 0 /100 WBC
PH UR STRIP: 6 [PH] (ref 5–8)
PLATELET # BLD AUTO: 198 K/UL (ref 150–350)
PMV BLD AUTO: 9.5 FL (ref 9.2–12.9)
POTASSIUM SERPL-SCNC: 4.4 MMOL/L (ref 3.5–5.1)
PROT SERPL-MCNC: 7.5 G/DL (ref 6–8.4)
PROT UR QL STRIP: NEGATIVE
RBC # BLD AUTO: 4 M/UL (ref 4–5.4)
SODIUM SERPL-SCNC: 142 MMOL/L (ref 136–145)
SP GR UR STRIP: 1.02 (ref 1–1.03)
SQUAMOUS #/AREA URNS HPF: 2 /HPF
URN SPEC COLLECT METH UR: ABNORMAL
UROBILINOGEN UR STRIP-ACNC: NEGATIVE EU/DL
WBC # BLD AUTO: 9.75 K/UL (ref 3.9–12.7)
WBC #/AREA URNS HPF: 9 /HPF (ref 0–5)

## 2019-08-28 PROCEDURE — 36415 COLL VENOUS BLD VENIPUNCTURE: CPT

## 2019-08-28 PROCEDURE — 63600175 PHARM REV CODE 636 W HCPCS: Performed by: NURSE PRACTITIONER

## 2019-08-28 PROCEDURE — 96375 TX/PRO/DX INJ NEW DRUG ADDON: CPT

## 2019-08-28 PROCEDURE — 63600175 PHARM REV CODE 636 W HCPCS: Performed by: EMERGENCY MEDICINE

## 2019-08-28 PROCEDURE — 25000003 PHARM REV CODE 250: Performed by: NURSE PRACTITIONER

## 2019-08-28 PROCEDURE — 99284 EMERGENCY DEPT VISIT MOD MDM: CPT | Mod: 25

## 2019-08-28 PROCEDURE — 85025 COMPLETE CBC W/AUTO DIFF WBC: CPT

## 2019-08-28 PROCEDURE — 96374 THER/PROPH/DIAG INJ IV PUSH: CPT

## 2019-08-28 PROCEDURE — 80053 COMPREHEN METABOLIC PANEL: CPT

## 2019-08-28 PROCEDURE — 81000 URINALYSIS NONAUTO W/SCOPE: CPT

## 2019-08-28 PROCEDURE — 96372 THER/PROPH/DIAG INJ SC/IM: CPT | Mod: 59

## 2019-08-28 RX ORDER — DICLOFENAC SODIUM 10 MG/G
2 GEL TOPICAL 4 TIMES DAILY PRN
Qty: 1 TUBE | Refills: 0 | Status: SHIPPED | OUTPATIENT
Start: 2019-08-28 | End: 2019-10-28

## 2019-08-28 RX ORDER — METHOCARBAMOL 500 MG/1
500 TABLET, FILM COATED ORAL
Status: COMPLETED | OUTPATIENT
Start: 2019-08-28 | End: 2019-08-28

## 2019-08-28 RX ORDER — METHOCARBAMOL 750 MG/1
750 TABLET, FILM COATED ORAL EVERY 8 HOURS PRN
Qty: 40 TABLET | Refills: 0 | Status: SHIPPED | OUTPATIENT
Start: 2019-08-28 | End: 2019-12-10 | Stop reason: ALTCHOICE

## 2019-08-28 RX ORDER — MORPHINE SULFATE 4 MG/ML
4 INJECTION, SOLUTION INTRAMUSCULAR; INTRAVENOUS
Status: COMPLETED | OUTPATIENT
Start: 2019-08-28 | End: 2019-08-28

## 2019-08-28 RX ORDER — CYCLOBENZAPRINE HCL 10 MG
10 TABLET ORAL
Status: DISCONTINUED | OUTPATIENT
Start: 2019-08-28 | End: 2019-08-28

## 2019-08-28 RX ORDER — MORPHINE SULFATE 2 MG/ML
6 INJECTION, SOLUTION INTRAMUSCULAR; INTRAVENOUS
Status: COMPLETED | OUTPATIENT
Start: 2019-08-28 | End: 2019-08-28

## 2019-08-28 RX ORDER — ONDANSETRON 2 MG/ML
4 INJECTION INTRAMUSCULAR; INTRAVENOUS
Status: COMPLETED | OUTPATIENT
Start: 2019-08-28 | End: 2019-08-28

## 2019-08-28 RX ADMIN — MORPHINE SULFATE 4 MG: 4 INJECTION, SOLUTION INTRAMUSCULAR; INTRAVENOUS at 06:08

## 2019-08-28 RX ADMIN — METHOCARBAMOL 500 MG: 500 TABLET, FILM COATED ORAL at 05:08

## 2019-08-28 RX ADMIN — ONDANSETRON 4 MG: 2 INJECTION INTRAMUSCULAR; INTRAVENOUS at 04:08

## 2019-08-28 RX ADMIN — MORPHINE SULFATE 6 MG: 2 INJECTION, SOLUTION INTRAMUSCULAR; INTRAVENOUS at 04:08

## 2019-08-28 NOTE — ED NOTES
Pt states that the cyclobenzaprine interacts with her vIIbryd and that her nephrologist told her she can take robaxin without interaction.

## 2019-08-28 NOTE — TELEPHONE ENCOUNTER
----- Message from Piper Cotton sent at 8/27/2019  4:56 PM CDT -----  Contact: Edmund [facility] calling states the medication cyclobenzaprine (FLEXERIL) 10 MG tablet has a drug reaction with another medication the pt is on- vilazodone (VIIBRYD) 40 mg Tab tablet..  [id number] - [place] - [address]  [address] [place] [address]  Phone: [phone] Fax: [phone]

## 2019-08-28 NOTE — ED TRIAGE NOTES
Pt reports r flank pain x 2 weeks. Pt reports pain is constant. Pt has seen back and spine clinic 2 wks ago and was told it was a pulled muscle but muscle relaxer and tens unit made no improvement in pain. Pt denies any fever/chills/body aches. Reports nausea today due to pain as well as feeling like she was going to pass out due to pain. Pt reports kidney transplant was in January. Pt reports that her kidney function was good last Monday. Pt reports dx of parovirus in May with levels now almost normal. Denies any change in urination or urinary symptoms.

## 2019-08-28 NOTE — ED PROVIDER NOTES
Encounter Date: 8/28/2019    SCRIBE #1 NOTE: I, Rosie Ellison, am scribing for, and in the presence of, ROYAL Vega.       History     Chief Complaint   Patient presents with    Back Pain     right sided, seen at back and spine clinic, was told that he is a pulled muscle       Time seen by provider: 4:03 PM on 08/28/2019    Andra Newell is a 59 y.o. female with a PMHx of HTN, HLD, diabetes and renal disease who presents to the ED with an onset of right sided, middle back pain that started 3 weeks ago. Patient endorses pain has been worsening and affirms associated back spasms and nausea secondary to pain. Patient reports pain is moving into her right side and states pain was so bad today that she nearly passed out. The patient reports visiting the back and spine clinic 9 days ago on 8/19 and the doctor suspected it was muscular. The patient states she has had 2 professional massages since then, has been using a TENS unit and has been using Biofreeze and Robaxin with no improvement. Patient endorses having a kidney transplant in January and only has a right kidney. She also affirms she was on dialysis for 4 months. The patient denies vomiting, blood in the urine, difficulty urinating, fever, loss of bowel control or any other symptoms at this time. Patient endorses chronic lower back pain but denies that it is new or worsened. PSHx includes back surgery, cholecystectomy, and kidney transplant. Allergic to Torsemide and Codeine.      The history is provided by the patient and a relative.     Review of patient's allergies indicates:   Allergen Reactions    Torsemide Diarrhea     Diarrhea stopped once discontinued med    Codeine Itching     Can take oxycodone and hydrocodone with Benadryl     Past Medical History:   Diagnosis Date    Anemia     Anemia in chronic kidney disease, on chronic dialysis 7/12/2017    Aplastic anemia with parvovirus B19 infection 5/27/2019    Arthritis     Asthma      allergic airway    CKD stage III (moderate) 2/18/2019    Colon polyp     Depression     Diabetes mellitus     Diverticulosis     Eosinophilia     ESRD (end stage renal disease)     stage V, due for living donor 9/2018    ESRD on dialysis     GERD (gastroesophageal reflux disease)     Gout     Gout     Hyperlipidemia     Hypertension     Hypertriglyceridemia without hypercholesterolemia 6/9/2019    Low back pain     Low HDL (under 40) 6/9/2019    MRSA carrier     Neutropenia, unspecified 5/8/2019    Obesity     Renal disorder     Rotator cuff syndrome--left     Thyroid disease     Ulnar neuropathy of right upper extremity     Uncontrolled type 2 diabetes mellitus with hyperglycemia      Past Surgical History:   Procedure Laterality Date    ACHILLES TENDON SURGERY Left May 2015    ARTHROSCOPY, HIP Left 10/3/2013    Performed by [facility]oe [facility]dez [facility] at [facility] OR    ARTHROSCOPY-HIP WITH TROCHANTERIC BURSECTOMY Left 10/3/2013    [facility] by [facility] at [facility] OR    ARTHROSCOPY-SHOULDER Left 11/24/2015    [facility] OR    ARTHROSCOPY-SHOULDER WITH SUBACROMIAL DECOMPRESSION Left 7/12/2016    [facility] by [facility] at [facility] OR    BACK SURGERY      CHOLECYSTECTOMY      COLONOSCOPY N/A 5/9/2019    [facility]formed by [facility]O (2ND FLR)    COLONOSCOPY N/A 9/6/2017    [facility]    DEBRIDEMENT-SHOULDER-ARTHROSCOPIC Left 7/12/2016    [facility] by [facility] at [facility] OR    DEBRIDEMENT-SHOULDER-ARTHROSCOPIC; LABRAL Left 11/24/2015    Performed by [facility]dez [facility] at [facility] OR    DIALYSIS FISTULA CREATION  12/2017    EGD (ESOPHAGOGASTRODUODENOSCOPY) N/A 5/9/2019    Performed by [facility] at [facility] ENDO (2ND FLR)    FEMOROPLASTY, ARTHROSCOPIC Left 10/3/2013    [facility] by [facility] at [facility] OR    HEMORRHOID SURGERY      INJECTION-AMNIOX Left 7/12/2016    Performed by Moe TEMPLE  [facility] OR    JOINT REPLACEMENT      left    KNEE SURGERY      LYSIS-ADHESION Left 11/24/2015    [facility] OR    REPAIR ROTATOR CUFF ARTHROSCOPIC Left 7/12/2016    [facility] at [facility] OR    REPAIR-TENDON-BICEP Left 11/24/2015    [facility] at [facility] OR    SHOULDER ARTHROSCOPY      SHOULDER SURGERY      TRANSPLANT, KIDNEY N/A 1/14/2019    [facility]formed [facility]    UPPER GASTROINTESTINAL ENDOSCOPY  ~2013    Dr. Moralez     Family History   Problem Relation Age of Onset    Diabetes Mother     Alzheimer's disease Mother     Thyroid disease Mother     Hyperlipidemia Mother     Heart disease Father     Stroke Father     Diabetes Sister     Lupus Sister     Ovarian cancer Sister     Heart disease Brother     No Known Problems Son     Diabetes Maternal Grandmother     Hypertension Maternal Grandmother     No Known Problems Paternal Grandmother     No Known Problems Paternal Grandfather     COPD Maternal Aunt     Diabetes Maternal Aunt     Heart disease Maternal Aunt     Hypertension Maternal Aunt     No Known Problems Maternal Uncle     No Known Problems Paternal Aunt     No Known Problems Paternal Uncle     Colon cancer Neg Hx     Colon polyps Neg Hx     Crohn's disease Neg Hx     Ulcerative colitis Neg Hx     Celiac disease Neg Hx      Social History     Tobacco Use    Smoking status: Never Smoker    Smokeless tobacco: Never Used   Substance Use Topics    Alcohol use: No    Drug use: No     Review of Systems   Constitutional: Negative for chills and fever.   HENT: Negative for facial swelling and trouble swallowing.    Eyes: Negative for visual disturbance.   Respiratory: Negative for cough and shortness of breath.    Cardiovascular: Negative for chest pain.   Gastrointestinal: Positive for nausea. Negative for abdominal pain, diarrhea and vomiting.   Genitourinary: Positive for  flank pain (right). Negative for decreased urine volume, difficulty urinating, dysuria and hematuria.   Musculoskeletal: Positive for back pain and myalgias. Negative for arthralgias, joint swelling and neck pain.   Skin: Negative for color change, pallor, rash and wound.   Neurological: Negative for dizziness, syncope, weakness, light-headedness, numbness and headaches.   Hematological: Does not bruise/bleed easily.   Psychiatric/Behavioral: The patient is not nervous/anxious.    All other systems reviewed and are negative.      Physical Exam     Initial Vitals   BP Pulse Resp Temp SpO2   08/28/19 1540 08/28/19 1539 08/28/19 1539 08/28/19 1539 08/28/19 1539   (!) 158/83 79 19 98.2 °F (36.8 °C) 100 %      MAP       --                Physical Exam    Nursing note and vitals reviewed.  Constitutional: Vital signs are normal. She appears well-developed and well-nourished. She is Obese .   HENT:   Head: Normocephalic and atraumatic.   Eyes: Pupils are equal, round, and reactive to light.   Neck: Neck supple.   Cardiovascular: Normal rate, regular rhythm, normal heart sounds and intact distal pulses. Exam reveals no gallop and no friction rub.    No murmur heard.  Pulmonary/Chest: Effort normal and breath sounds normal. She has no wheezes. She has no rhonchi. She has no rales.   Abdominal: Soft. Normal appearance and bowel sounds are normal. There is no tenderness. There is CVA tenderness (right).   Musculoskeletal:   No midline cervical, thoracic or lumbar tenderness. Right paraspinal lumbar spine tenderness.   Neurological: She is alert and oriented to person, place, and time. She has normal strength.   5/5 strength and sensation to light touch intact for BUE and BLEs.   Skin: Skin is warm, dry and intact.   Psychiatric: She has a normal mood and affect. Her speech is normal and behavior is normal.         ED Course   Procedures  Labs Reviewed   CBC W/ AUTO DIFFERENTIAL - Abnormal; Notable for the following components:        Result Value    Gran # (ANC) 8.0 (*)     Immature Grans (Abs) 0.05 (*)     Lymph # 0.8 (*)     Gran% 81.5 (*)     Lymph% 8.0 (*)     All other components within normal limits   COMPREHENSIVE METABOLIC PANEL - Abnormal; Notable for the following components:    Glucose 127 (*)     eGFR if  56 (*)     eGFR if non  49 (*)     All other components within normal limits   URINALYSIS, REFLEX TO URINE CULTURE - Abnormal; Notable for the following components:    Leukocytes, UA Trace (*)     All other components within normal limits    Narrative:     Preferred Collection Type->Urine, Clean Catch   URINALYSIS MICROSCOPIC - Abnormal; Notable for the following components:    WBC, UA 9 (*)     All other components within normal limits    Narrative:     Preferred Collection Type->Urine, Clean Catch          Imaging Results          CT Renal Stone Study ABD Pelvis WO (Final result)  Result time 08/28/19 16:50:25    Final result by Joselito Angel MD (08/28/19 16:50:25)                 Impression:      Bilateral native renal atrophy with right lower quadrant renal transplant.  No evidence for urolithiasis or hydronephrosis.    Two new small hepatic hypodensities which are indeterminate however given that these appear new since multiple prior examinations, follow-up evaluation in 3 months preferably with MRI abdomen with and without contrast is recommended.    Cholecystectomy.  Diverticulosis.      Electronically signed by: Joselito Angel MD  Date:    08/28/2019  Time:    16:50             Narrative:    EXAMINATION:  CT RENAL STONE STUDY ABD PELVIS WO    CLINICAL HISTORY:  Flank pain, stone disease suspected;    TECHNIQUE:  Low dose axial images, sagittal and coronal reformations were obtained from the lung bases to the pubic symphysis.  Contrast was not administered.    COMPARISON:  05/07/2019    FINDINGS:  Two hepatic hypodensities are present which are nonspecific however not present on  previous examinations, measuring 11 mm within segment 7, and 8 mm within segment 8.    There has been a cholecystectomy.  The pancreas, spleen, and adrenal glands are unremarkable.    There is severe bilateral renal atrophy.  A few small cysts of the kidneys are present.  No hydronephrosis.  There is no urolithiasis.  A right lower quadrant renal transplant is present demonstrating no evidence for urolithiasis or hydroureteronephrosis.    The small bowel is normal caliber.  There is mild diverticulosis coli.  There is mild calcification of the aorta without aneurysm.  Moderate degenerative change of the lumbar spine is present.                            (rad read)    The patient was informed of the incidental finding(s) as well as the need for PCP or specialist follow-up for reevaluation and possible further investigation or monitoring.       Medical Decision Making:   History:   Old Medical Records: I decided to obtain old medical records.  Differential Diagnosis:   Lumbar strain  Cauda equina  Nephrolithiasis   Clinical Tests:   Lab Tests: Ordered and Reviewed  Radiological Study: Ordered and Reviewed       APC / Resident Notes:   Patient is a 59 y.o. female who presents to the ED 08/28/2019 who underwent emergent evaluation for lower back pain that is progressively worsening for 3 weeks.  There has been no trauma. Patient does have a history of lower back pain. Patient was seen by her back and spine provider who told her it was muscular pain and prescribed her muscle relaxers.  She cannot take anti-inflammatories due to a history of right kidney transplant.  Patient is concerned that the pain is in her right flank and has concerns about her single kidney.  She does have CVA tenderness but her pain seems more musculoskeletal on exam and is reproducible and you can't visualize muscle spasms in the right side of her back.  She has no midline C, T, or L-spine tenderness. She has normal 5/5 strength and sensation  bilateral upper and lower extremities and she is ambulatory in the emergency department with no signs of cauda equina.  I do not think emergent MRI is indicated this time.  Patient did have recent MRI within the last 6 months and has not had any trauma since this time.  I do not think other process such as epidural abscess.  Vital signs normal. She is given medication for pain and she is undergoing a CT of abdomen and pelvis to rule out nephrolithiasis which is negative in the emergency department.  Urinalysis and metabolic panel pending.  Once this is negative a believe patient should be discharged as I do not think patient has nephrolithiasis or obstructive process or any other acute intra-abdominal process and her pain does appear to be musculoskeletal in origin.  She is given morphine and muscle relaxers in the emergency department for her pain. Report given to Dr. Garcia who will follow up on labs and urinalysis and discharge patient at 1712.          Scribe Attestation:   Scribe #1: I performed the above scribed service and the documentation accurately describes the services I performed. I attest to the accuracy of the note.    Attending Attestation:     Physician Attestation Statement for NP/PA:   I have conducted a face to face encounter with this patient in addition to the NP/PA, due to Medical Complexity    Other NP/PA Attestation Additions:      Medical Decision Making: Andra Newell is a 59 y.o. female presenting with chronic right-sided back pain.  Musculoskeletal etiology is likely as previously suggested by other providers.  On my examination there is lateral right-sided back tenderness with no midline vertebral tenderness. Abdomen is soft and nontender without distention.  I have low suspicion for acute life-threatening intra-abdominal process.  CT done to rule out obstructive uropathy shows no other acute process.  Incidental findings discussed to be followed up with PCP.  No sign of UTI  or renal insult.  Voltaren gel recommended as systemic NSAIDs contraindicated given renal transplant with single kidney.  She may otherwise continue regimen previously suggested by prior follow-up for back. 5/5 strength and sensation.  2+ DP/PT pulses bilaterally.The patient has a nonfocal neurological examination with no red flags.  I doubt acute spinal cord processes requiring further spinal imaging such as CT or MRI.  I doubt epidural abscesses, severe deep space infection, transverse myelitis, discitis, cauda equina syndrome, or other emergent conditions.  Follow up with PCP or back specialist.  Return precautions reviewed.       Physician Attestation for Scribe:  Physician Attestation Statement for Scribe #1: I, Maite Alberts, reviewed documentation, as scribed by in my presence, and it is both accurate and complete.     Comments: I, ROYAL Vega, personally performed the services described in this documentation. All medical record entries made by the scribe were at my direction and in my presence.  I have reviewed the chart and agree that the record reflects my personal performance and is accurate and complete. ROYAL Vega.  5:13 PM 08/28/2019 e                 Clinical Impression:       ICD-10-CM ICD-9-CM   1. Strain of lumbar region, initial encounter S39.012A 847.2   2. Acute right-sided low back pain without sciatica M54.5 724.2         Disposition:   Disposition: Discharged  Condition: Stable                        Tristan Garcia MD  08/28/19 0937

## 2019-08-28 NOTE — TELEPHONE ENCOUNTER
Spoke with Soniya at [facility] [phone] and let her know Maite said to discontinue the Flexiril and she will order Robaxin instead, she indicated understanding.

## 2019-08-28 NOTE — TELEPHONE ENCOUNTER
----- Message from Sunita Chu sent at 8/28/2019  2:41 PM CDT -----  Type: Needs Medical Advice    Who Called:  Partner/Any Bryson  Best Call Back Number:[phone]  Additional Information: Is bringing patient to the ER in [facility] due to patient being in a lot of pain in her back. She wanted to let office know what is happening. Please call to advise.

## 2019-09-03 ENCOUNTER — LAB VISIT (OUTPATIENT)
Dept: LAB | Facility: HOSPITAL | Age: 59
End: 2019-09-03
Attending: INTERNAL MEDICINE
Payer: MEDICARE

## 2019-09-03 ENCOUNTER — RESEARCH ENCOUNTER (OUTPATIENT)
Dept: RESEARCH | Facility: HOSPITAL | Age: 59
End: 2019-09-03

## 2019-09-03 ENCOUNTER — PATIENT MESSAGE (OUTPATIENT)
Dept: SPINE | Facility: CLINIC | Age: 59
End: 2019-09-03

## 2019-09-03 DIAGNOSIS — M54.50 ACUTE RIGHT-SIDED LOW BACK PAIN WITHOUT SCIATICA: ICD-10-CM

## 2019-09-03 DIAGNOSIS — Z00.6 RESEARCH STUDY PATIENT: ICD-10-CM

## 2019-09-03 DIAGNOSIS — M54.6 ACUTE RIGHT-SIDED THORACIC BACK PAIN: Primary | ICD-10-CM

## 2019-09-03 DIAGNOSIS — Z94.0 STATUS POST LIVING-DONOR KIDNEY TRANSPLANTATION: ICD-10-CM

## 2019-09-03 DIAGNOSIS — Z94.0 KIDNEY REPLACED BY TRANSPLANT: ICD-10-CM

## 2019-09-03 DIAGNOSIS — Z94.0 STATUS POST LIVING-DONOR KIDNEY TRANSPLANTATION: Primary | ICD-10-CM

## 2019-09-03 DIAGNOSIS — B34.3 PARVOVIRUS B19 INFECTION: ICD-10-CM

## 2019-09-03 DIAGNOSIS — Z20.828 PARVOVIRUS EXPOSURE: ICD-10-CM

## 2019-09-03 DIAGNOSIS — M54.50 ACUTE BILATERAL LOW BACK PAIN WITHOUT SCIATICA: ICD-10-CM

## 2019-09-03 LAB
ALBUMIN SERPL BCP-MCNC: 4.2 G/DL (ref 3.5–5.2)
ANION GAP SERPL CALC-SCNC: 10 MMOL/L (ref 8–16)
BASOPHILS # BLD AUTO: 0.05 K/UL (ref 0–0.2)
BASOPHILS NFR BLD: 0.6 % (ref 0–1.9)
BUN SERPL-MCNC: 27 MG/DL (ref 8–23)
CALCIUM SERPL-MCNC: 9.6 MG/DL (ref 8.7–10.5)
CHLORIDE SERPL-SCNC: 105 MMOL/L (ref 95–110)
CO2 SERPL-SCNC: 26 MMOL/L (ref 23–29)
CREAT SERPL-MCNC: 1.3 MG/DL (ref 0.5–1.4)
DIFFERENTIAL METHOD: ABNORMAL
DRUG STUDY SPECIMEN TYPE: NORMAL
DRUG STUDY TEST NAME: NORMAL
DRUG STUDY TEST RESULT: NORMAL
EOSINOPHIL # BLD AUTO: 0.3 K/UL (ref 0–0.5)
EOSINOPHIL NFR BLD: 3.8 % (ref 0–8)
ERYTHROCYTE [DISTWIDTH] IN BLOOD BY AUTOMATED COUNT: 12.9 % (ref 11.5–14.5)
EST. GFR  (AFRICAN AMERICAN): 51.2 ML/MIN/1.73 M^2
EST. GFR  (NON AFRICAN AMERICAN): 44.4 ML/MIN/1.73 M^2
GLUCOSE SERPL-MCNC: 251 MG/DL (ref 70–110)
HCT VFR BLD AUTO: 38.7 % (ref 37–48.5)
HGB BLD-MCNC: 12.9 G/DL (ref 12–16)
IMM GRANULOCYTES # BLD AUTO: 0.05 K/UL (ref 0–0.04)
IMM GRANULOCYTES NFR BLD AUTO: 0.6 % (ref 0–0.5)
LYMPHOCYTES # BLD AUTO: 0.7 K/UL (ref 1–4.8)
LYMPHOCYTES NFR BLD: 8.5 % (ref 18–48)
MAGNESIUM SERPL-MCNC: 1.6 MG/DL (ref 1.6–2.6)
MCH RBC QN AUTO: 31 PG (ref 27–31)
MCHC RBC AUTO-ENTMCNC: 33.3 G/DL (ref 32–36)
MCV RBC AUTO: 93 FL (ref 82–98)
MONOCYTES # BLD AUTO: 0.5 K/UL (ref 0.3–1)
MONOCYTES NFR BLD: 6.1 % (ref 4–15)
NEUTROPHILS # BLD AUTO: 6.8 K/UL (ref 1.8–7.7)
NEUTROPHILS NFR BLD: 80.4 % (ref 38–73)
NRBC BLD-RTO: 0 /100 WBC
PHOSPHATE SERPL-MCNC: 3.3 MG/DL (ref 2.7–4.5)
PLATELET # BLD AUTO: 203 K/UL (ref 150–350)
PMV BLD AUTO: 9.9 FL (ref 9.2–12.9)
POTASSIUM SERPL-SCNC: 4.6 MMOL/L (ref 3.5–5.1)
RBC # BLD AUTO: 4.16 M/UL (ref 4–5.4)
SODIUM SERPL-SCNC: 141 MMOL/L (ref 136–145)
TACROLIMUS BLD-MCNC: 6.5 NG/ML (ref 5–15)
WBC # BLD AUTO: 8.46 K/UL (ref 3.9–12.7)

## 2019-09-03 PROCEDURE — 85025 COMPLETE CBC W/AUTO DIFF WBC: CPT

## 2019-09-03 PROCEDURE — 99213 OFFICE O/P EST LOW 20 MIN: CPT | Mod: ,,, | Performed by: CLINICAL NURSE SPECIALIST

## 2019-09-03 PROCEDURE — 87799 DETECT AGENT NOS DNA QUANT: CPT

## 2019-09-03 PROCEDURE — 99000 SPECIMEN HANDLING OFFICE-LAB: CPT

## 2019-09-03 PROCEDURE — 99213 PR OFFICE/OUTPT VISIT, EST, LEVL III, 20-29 MIN: ICD-10-PCS | Mod: ,,, | Performed by: CLINICAL NURSE SPECIALIST

## 2019-09-03 PROCEDURE — 36415 COLL VENOUS BLD VENIPUNCTURE: CPT

## 2019-09-03 PROCEDURE — 80197 ASSAY OF TACROLIMUS: CPT

## 2019-09-03 PROCEDURE — 83735 ASSAY OF MAGNESIUM: CPT

## 2019-09-03 PROCEDURE — 86352 CELL FUNCTION ASSAY W/STIM: CPT

## 2019-09-03 PROCEDURE — 80069 RENAL FUNCTION PANEL: CPT

## 2019-09-03 NOTE — PROGRESS NOTES
Subjective:      Patient ID: Andra Newell is a 59 y.o. female.    Chief Complaint:Research      History of Present Illness  [employer] CMV Prevention (Kidney Transplant)  Protocol: [id number]  IRB# [id number]  PI: Nicholas GUAN)  Site: [id number]  Subject #[id number]     Visit 14/Week 32:      Date of Visit: 03SEP2019    Ms. Andra Newell is a 59 y.o. female with a past medical history of diabetes type 2, HTN, hyperlipidemia, arthritis, lumbar degenerative disc disease, lumbar stenosis, GERD, and ESRD who is s/p a living-donor kidney transplant (D+/R-) on 1/14/19. Ms. Newell seen today in clinic for research visit for [facility] CMV prevention study. Study medication was stopped in May when patient was diagnosed with Parvovirus. She continues to be seen for follow up. Research visit conducted per Alda SARAVIA.     Ms. Newell reports that since we saw her last on 8/7/19 that she has new onset pain to right mid to lower back. States it began approximately 3 weeks ago. Reports it is different than the chronic back pain she has had for years. She has had follow up with  Neurosurgery and presented to the ED last week due to the pain. States it is a constant 10/10. She is being referred to Pain Management. Denies fever, chills, n/v/d, shortness of breath or chest pain. No other changes to her health, no other symptoms or complaints.       Current Outpatient Medications:     blood sugar diagnostic (BLOOD GLUCOSE TEST) Strp, Checks her bg 4 times a day   e11.9, Disp: 400 strip, Rfl: 4    diclofenac sodium (VOLTAREN) 1 % Gel, Apply 2 g topically 4 (four) times daily as needed. For pain, Disp: 1 Tube, Rfl: 0    insulin aspart U-100 (NOVOLOG FLEXPEN U-100 INSULIN) 100 unit/mL (3 mL) InPn pen, 24 units AC + correction scale. Max TDD: 92 units (Patient taking differently: 2 units AC + correction scale. Max TDD: 92 units), Disp: 30 mL, Rfl: 12    insulin degludec (TRESIBA FLEXTOUCH U-200) 200 unit/mL (3 mL) InPn,  Inject 80 Units into the skin every evening., Disp: 27 mL, Rfl: 12    levothyroxine (SYNTHROID) 75 MCG tablet, Take 1 tablet (75 mcg total) by mouth once daily., Disp: 30 tablet, Rfl: 4    LORazepam (ATIVAN) 2 MG Tab, Take 1 tablet (2 mg total) by mouth 2 (two) times daily., Disp: 60 tablet, Rfl: 1    magnesium oxide (MAG-OX) 400 mg (241.3 mg magnesium) tablet, Take 2 tablets (800 mg total) by mouth once daily., Disp: 60 tablet, Rfl: 11    methocarbamol (ROBAXIN-750) 750 MG Tab, Take 1 tablet (750 mg total) by mouth every 8 (eight) hours as needed (muscle spasm/ pain)., Disp: 40 tablet, Rfl: 0    multivitamin (THERAGRAN) tablet, Take 1 tablet by mouth once daily., Disp: , Rfl:     mycophenolate (CELLCEPT) 250 mg Cap, Take 1 capsule (250 mg total) by mouth 2 (two) times daily., Disp: 60 capsule, Rfl: 11    oxybutynin (DITROPAN-XL) 10 MG 24 hr tablet, Take 1 tablet (10 mg total) by mouth once daily., Disp: 90 tablet, Rfl: 3    pantoprazole (PROTONIX) 40 MG tablet, Take 1 tablet (40 mg total) by mouth once daily., Disp: 30 tablet, Rfl: 11    rosuvastatin (CRESTOR) 10 MG tablet, Take 1 tablet (10 mg total) by mouth once daily., Disp: 90 tablet, Rfl: 1    sulfamethoxazole-trimethoprim 400-80mg (BACTRIM,SEPTRA) 400-80 mg per tablet, Take 1 tablet by mouth once daily., Disp: 30 tablet, Rfl: 6    tacrolimus (PROGRAF) 0.5 MG Cap, Take 1 capsule (0.5 mg total) by mouth every evening. Total 2mg in AM PO and 1.5mg PO in PM Z94.0, Disp: 30 capsule, Rfl: 11    tacrolimus (PROGRAF) 1 MG Cap, Take 2 capsules (2 mg total) by mouth every morning AND 1 capsule (1 mg total) every evening. Total 2mg in AM PO and 1.5mg in PM PO. Z94.0., Disp: 90 capsule, Rfl: 11    vilazodone (VIIBRYD) 40 mg Tab tablet, Take 1 tablet (40 mg total) by mouth once daily., Disp: 30 tablet, Rfl: 4    Current Facility-Administered Medications:     sodium chloride 0.9% bolus 2,000 mL, 2,000 mL, Intravenous, Once, Maureen Cabral MD        Review of Systems   Constitution: Negative for chills and fever.   HENT: Negative for congestion and sore throat.    Cardiovascular: Negative for chest pain and leg swelling.   Respiratory: Negative for shortness of breath and sputum production.    Musculoskeletal: Positive for back pain. Negative for falls.   Gastrointestinal: Negative for nausea and vomiting.   Genitourinary: Negative for hematuria.   Psychiatric/Behavioral: Negative for altered mental status.     Objective:   Physical Exam   Constitutional: She is oriented to person, place, and time. She appears well-developed and well-nourished. No distress.   HENT:   Head: Normocephalic and atraumatic.   Eyes: Conjunctivae and EOM are normal. Right eye exhibits no discharge. Left eye exhibits no discharge.   Neck: Normal range of motion.   Cardiovascular: Normal rate.   Pulmonary/Chest: Effort normal and breath sounds normal. No respiratory distress.   Abdominal: Soft. Bowel sounds are normal. There is no tenderness.   Musculoskeletal: Normal range of motion. She exhibits no edema.   Neurological: She is alert and oriented to person, place, and time.   Skin: Skin is warm and dry.   Psychiatric: She has a normal mood and affect. Her behavior is normal.   Vitals reviewed.    Assessment:       1. Status post living-donor kidney transplantation    2. Research study patient    3. Parvovirus B19 infection    4. Acute right-sided low back pain without sciatica          Plan:       1. Research labs and procedures as per study protocol  2. Return to ID research in 4 weeks on 9/30/19

## 2019-09-03 NOTE — TELEPHONE ENCOUNTER
Spoke with patient and scheduled her an appointment with Dr. Ferrer for 9-9-19, she indicated understanding.

## 2019-09-04 ENCOUNTER — TELEPHONE (OUTPATIENT)
Dept: SPINE | Facility: CLINIC | Age: 59
End: 2019-09-04

## 2019-09-04 NOTE — TELEPHONE ENCOUNTER
Called patient to reschedule her appointment with Maite Alfonso, got voicemail, left return call message.

## 2019-09-04 NOTE — TELEPHONE ENCOUNTER
----- Message from Samantha Mireles sent at 9/4/2019 10:32 AM CDT -----  Contact: patient  Type:  Patient Returning Call    Who Called:  patient  Who Left Message for Patient:  linda  Does the patient know what this is regarding?:  Not sure  Best Call Back Number:  [phone]  Additional Information:  Place call to pod,requesting a call back

## 2019-09-04 NOTE — TELEPHONE ENCOUNTER
The appointment scheduled for Maitecorey Alfonso 10-4-19 was rescheduled to 9-24-19, she indicated understanding.

## 2019-09-05 LAB — IMMUNKNOW (STIMULATED): 312 NG/ML (ref 226–524)

## 2019-09-09 ENCOUNTER — DOCUMENTATION ONLY (OUTPATIENT)
Dept: TRANSPLANT | Facility: CLINIC | Age: 59
End: 2019-09-09

## 2019-09-09 ENCOUNTER — OFFICE VISIT (OUTPATIENT)
Dept: PAIN MEDICINE | Facility: CLINIC | Age: 59
End: 2019-09-09
Payer: MEDICARE

## 2019-09-09 VITALS
SYSTOLIC BLOOD PRESSURE: 128 MMHG | HEIGHT: 67 IN | WEIGHT: 208 LBS | DIASTOLIC BLOOD PRESSURE: 75 MMHG | BODY MASS INDEX: 32.65 KG/M2 | HEART RATE: 76 BPM

## 2019-09-09 DIAGNOSIS — M96.1 POSTLAMINECTOMY SYNDROME OF LUMBAR REGION: ICD-10-CM

## 2019-09-09 DIAGNOSIS — M47.896 OTHER SPONDYLOSIS, LUMBAR REGION: Primary | ICD-10-CM

## 2019-09-09 DIAGNOSIS — M51.36 DDD (DEGENERATIVE DISC DISEASE), LUMBAR: ICD-10-CM

## 2019-09-09 LAB
B19V DNA # SPEC NAA+PROBE: 419 COPIES/ML
EXT ALBUMIN: NORMAL
EXT ALKALINE PHOSPHATASE: NORMAL
EXT ALLOSURE: 0.2
EXT ALT: NORMAL
EXT AMYLASE: NORMAL
EXT ANC: NORMAL
EXT AST: NORMAL
EXT BACTERIA UA: NORMAL
EXT BANDS%: NORMAL
EXT BILIRUBIN DIRECT: NORMAL MG/DL
EXT BILIRUBIN TOTAL: NORMAL
EXT BK VIRUS DNA QN PCR: NORMAL
EXT BK VIRUS DNA QUANT, PCR, URINE: NORMAL
EXT BUN: NORMAL
EXT C PEPTIDE: NORMAL
EXT CALCIUM: NORMAL
EXT CHLORIDE: NORMAL
EXT CHOLESTEROL (LIPID PANEL): NORMAL
EXT CHOLESTEROL: NORMAL
EXT CMV DNA QUANT. BY PCR: NORMAL
EXT CO2: NORMAL
EXT CREATININE: NORMAL MG/DL
EXT CYCLOSPORINE LVL: NORMAL
EXT EBV DNA BY PCR: NORMAL
EXT EBV DNA-COPIES/ML: NORMAL
EXT EOSINOPHIL%: NORMAL
EXT FERRITIN: NORMAL
EXT GFR MDRD AF AMER: NORMAL
EXT GFR MDRD NON AF AMER: NORMAL
EXT GLUCOSE UA: NORMAL
EXT GLUCOSE: NORMAL
EXT HBV DNA QUANT PCR: NORMAL
EXT HCV QUANT: NORMAL
EXT HDL: NORMAL
EXT HEMATOCRIT: NORMAL
EXT HEMOGLOBIN A1C: NORMAL %
EXT HEMOGLOBIN: NORMAL
EXT HEP B S AG: NORMAL
EXT HIV: NORMAL
EXT IMMUNKNOW (STIMULATED): NORMAL
EXT INR: NORMAL
EXT IRON SATURATION: NORMAL
EXT LDH, TOTAL: NORMAL
EXT LDL CHOLESTEROL: NORMAL
EXT LIPASE: NORMAL
EXT LYMPH%: NORMAL
EXT MAGNESIUM: NORMAL
EXT MONOCYTES%: NORMAL
EXT NITRITES UA: NORMAL
EXT PHOSPHORUS: NORMAL
EXT PLATELETS: NORMAL
EXT POTASSIUM: NORMAL
EXT PROT/CREAT RATIO UR: NORMAL
EXT PROTEIN TOTAL: NORMAL
EXT PROTEIN UA: NORMAL
EXT PT: NORMAL
EXT PTH, INTACT: NORMAL
EXT RBC UA: NORMAL
EXT SEGS%: NORMAL
EXT SERUM IRON: NORMAL
EXT SIROLIMUS LVL: NORMAL
EXT SODIUM: NORMAL MMOL/L
EXT STOOL CDIFF: NORMAL
EXT STOOL CMV: NORMAL
EXT STOOL CULTURE: NORMAL
EXT STOOL OCP: NORMAL
EXT TACROLIMUS LVL: NORMAL
EXT TIBC: NORMAL
EXT TRIGLYCERIDES: NORMAL
EXT UNSATURATED IRON BINDING CAP.: NORMAL
EXT URIC ACID: NORMAL
EXT URINE CULTURE: NORMAL
EXT VIT D 25 HYDROXY: NORMAL
EXT WBC UA: NORMAL
EXT WBC: NORMAL

## 2019-09-09 PROCEDURE — 3078F PR MOST RECENT DIASTOLIC BLOOD PRESSURE < 80 MM HG: ICD-10-PCS | Mod: CPTII,S$GLB,, | Performed by: ANESTHESIOLOGY

## 2019-09-09 PROCEDURE — 99999 PR PBB SHADOW E&M-EST. PATIENT-LVL III: CPT | Mod: PBBFAC,,, | Performed by: ANESTHESIOLOGY

## 2019-09-09 PROCEDURE — 99204 PR OFFICE/OUTPT VISIT, NEW, LEVL IV, 45-59 MIN: ICD-10-PCS | Mod: S$GLB,,, | Performed by: ANESTHESIOLOGY

## 2019-09-09 PROCEDURE — 3078F DIAST BP <80 MM HG: CPT | Mod: CPTII,S$GLB,, | Performed by: ANESTHESIOLOGY

## 2019-09-09 PROCEDURE — 3074F SYST BP LT 130 MM HG: CPT | Mod: CPTII,S$GLB,, | Performed by: ANESTHESIOLOGY

## 2019-09-09 PROCEDURE — 3008F PR BODY MASS INDEX (BMI) DOCUMENTED: ICD-10-PCS | Mod: CPTII,S$GLB,, | Performed by: ANESTHESIOLOGY

## 2019-09-09 PROCEDURE — 3074F PR MOST RECENT SYSTOLIC BLOOD PRESSURE < 130 MM HG: ICD-10-PCS | Mod: CPTII,S$GLB,, | Performed by: ANESTHESIOLOGY

## 2019-09-09 PROCEDURE — 3008F BODY MASS INDEX DOCD: CPT | Mod: CPTII,S$GLB,, | Performed by: ANESTHESIOLOGY

## 2019-09-09 PROCEDURE — 99204 OFFICE O/P NEW MOD 45 MIN: CPT | Mod: S$GLB,,, | Performed by: ANESTHESIOLOGY

## 2019-09-09 PROCEDURE — 99999 PR PBB SHADOW E&M-EST. PATIENT-LVL III: ICD-10-PCS | Mod: PBBFAC,,, | Performed by: ANESTHESIOLOGY

## 2019-09-09 RX ORDER — OXYBUTYNIN CHLORIDE 10 MG/1
TABLET, EXTENDED RELEASE ORAL
Status: ON HOLD | COMMUNITY
Start: 2019-09-02 | End: 2019-10-29

## 2019-09-09 RX ORDER — LORAZEPAM 1 MG/1
TABLET ORAL
COMMUNITY
Start: 2019-08-22 | End: 2019-09-09

## 2019-09-09 NOTE — H&P (VIEW-ONLY)
This note was completed with dictation software and grammatical errors may exist.    Referring Physician: Maite Alfonso PA-C    PCP: Primary Doctor No      CC:  Right-sided lower back pain    HPI:   Andra Newell is a 59 y.o. female referred to us for right-sided lower back pain. Pain has worsened over the past 2 months.  No recent traumatic incident.  She does have history of kidney transplant performed in January 2019.  She presents to us with constant aching, throbbing pain over her mid to lower back.  Pain is predominantly on the right side.  Pain does not radiate down her lower extremities. Pain worsens with standing, walking, extension, getting up.  She has tried physical therapy and massage therapy with minimal benefit.  She has not had any interventional procedures.  She recently had updated imaging of her lumbar spine.  She denies any worsening weakness.  No bowel bladder changes.    ROS:  CONSTITUTIONAL: No fevers, chills, night sweats, wt. loss, appetite changes  SKIN: no rashes or itching  ENT: No headaches, head trauma, vision changes, or eye pain  LYMPH NODES: None noticed   CV: No chest pain, palpitations.   RESP: No shortness of breath, dyspnea on exertion, cough, wheezing, or hemoptysis  GI: No nausea, emesis, diarrhea, constipation, melena, hematochezia, pain.    : No dysuria, hematuria, urgency, or frequency   HEME: No easy bruising, bleeding problems  PSYCHIATRIC: No depression, anxiety, psychosis, hallucinations.  NEURO: No seizures, memory loss, dizziness or difficulty sleeping  MSK:  Positive HPI      Past Medical History:   Diagnosis Date    Anemia     Anemia in chronic kidney disease, on chronic dialysis 7/12/2017    Aplastic anemia with parvovirus B19 infection 5/27/2019    Arthritis     Asthma     allergic airway    CKD stage III (moderate) 2/18/2019    Colon polyp     Depression     Diabetes mellitus     Diverticulosis     Eosinophilia     ESRD (end stage renal  disease)     stage V, due for living donor 9/2018    ESRD on dialysis     GERD (gastroesophageal reflux disease)     Gout     Gout     Hyperlipidemia     Hypertension     Hypertriglyceridemia without hypercholesterolemia 6/9/2019    Low back pain     Low HDL (under 40) 6/9/2019    MRSA carrier     Neutropenia, unspecified 5/8/2019    Obesity     Renal disorder     Rotator cuff syndrome--left     Thyroid disease     Ulnar neuropathy of right upper extremity     Uncontrolled type 2 diabetes mellitus with hyperglycemia      Past Surgical History:   Procedure Laterality Date    ACHILLES TENDON SURGERY Left May 2015    ARTHROSCOPY, HIP Left 10/3/2013    [facility]    ARTHROSCOPY-HIP WITH TROCHANTERIC BURSECTOMY Left 10/3/2013    [facility]    ARTHROSCOPY-SHOULDER Left 11/24/2015    [facility]    ARTHROSCOPY-SHOULDER WITH SUBACROMIAL DECOMPRESSION Left 7/12/2016    [facility]    BACK SURGERY      CHOLECYSTECTOMY      COLONOSCOPY N/A 5/9/2019    [facility] (2ND FLR)    COLONOSCOPY N/A 9/6/2017    [facility]    DEBRIDEMENT-SHOULDER-ARTHROSCOPIC Left 7/12/2016    [facility]    DEBRIDEMENT-SHOULDER-ARTHROSCOPIC; LABRAL Left 11/24/2015    [facility]    DIALYSIS FISTULA CREATION  12/2017    EGD (ESOPHAGOGASTRODUODENOSCOPY) N/A 5/9/2019    [facility] (2ND FLR)    FEMOROPLASTY, ARTHROSCOPIC Left 10/3/2013    [facility]    HEMORRHOID SURGERY      INJECTION-AMNIOX Left 7/12/2016    [facility]    JOINT REPLACEMENT      left    KNEE SURGERY      LYSIS-ADHESION Left 11/24/2015    [facility]    REPAIR ROTATOR CUFF  ARTHROSCOPIC Left 7/12/2016    [facility]    REPAIR-TENDON-BICEP Left 11/24/2015    [facility]    SHOULDER ARTHROSCOPY      SHOULDER SURGERY      TRANSPLANT, KIDNEY N/A 1/14/2019    [facility]    UPPER GASTROINTESTINAL ENDOSCOPY  ~2013     [facility]irt     Family History   Problem Relation Age of Onset    Diabetes Mother     Alzheimer's disease Mother     Thyroid disease Mother     Hyperlipidemia Mother     Heart disease Father     Stroke Father     Diabetes Sister     Lupus Sister     Ovarian cancer Sister     Heart disease Brother     No Known Problems Son     Diabetes Maternal Grandmother     Hypertension Maternal Grandmother     No Known Problems Paternal Grandmother     No Known Problems Paternal Grandfather     COPD Maternal Aunt     Diabetes Maternal Aunt     Heart disease Maternal Aunt     Hypertension Maternal Aunt     No Known Problems Maternal Uncle     No Known Problems Paternal Aunt     No Known Problems Paternal Uncle     Colon cancer Neg Hx     Colon polyps Neg Hx     Crohn's disease Neg Hx     Ulcerative colitis Neg Hx     Celiac disease Neg Hx      Social History     Socioeconomic History    Marital status: Significant Other     Spouse name: Not on file    Number of children: 2    Years of education: Not on file    Highest education level: Not on file   Occupational History    Occupation: retired     Comment:    Social Needs    Financial resource strain: Not on file    Food insecurity:     Worry: Not on file     Inability: Not on file    Transportation needs:     Medical: Not on file     Non-medical: Not on file   Tobacco Use    Smoking status: Never Smoker    Smokeless tobacco: Never Used   Substance and Sexual Activity    Alcohol use: No    Drug use: No    Sexual activity: Never   Lifestyle    Physical activity:     Days per week:  "Not on file     Minutes per session: Not on file    Stress: Not on file   Relationships    Social connections:     Talks on phone: Not on file     Gets together: Not on file     Attends Christian service: Not on file     Active member of club or organization: Not on file     Attends meetings of clubs or organizations: Not on file     Relationship status: Not on file   Other Topics Concern    Not on file   Social History Narrative    Not on file         Medications/Allergies: See med card    Vitals:    09/09/19 0823   BP: 128/75   Pulse: 76   Weight: 94.3 kg (208 lb)   Height: 5' 7" (1.702 m)   PainSc: 10-Worst pain ever   PainLoc: Back         Physical exam:    GENERAL: A and O x3, the patient appears well groomed and is in no acute distress.  Skin: No rashes or obvious lesions  HEENT: normocephalic, atraumatic  CARDIOVASCULAR:  Palpable peripheral pulses  LUNGS: easy work of breathing  ABDOMEN: soft, nontender   UPPER EXTREMITIES: Normal alignment, normal range of motion, no atrophy, no skin changes,  hair growth and nail growth normal and equal bilaterally. No swelling, no tenderness.    LOWER EXTREMITIES:  Normal alignment, normal range of motion, no atrophy, no skin changes,  hair growth and nail growth normal and equal bilaterally. No swelling, no tenderness.  LUMBAR SPINE  Lumbar spine: ROM is limited with flexion extension and oblique extension with moderate increased pain.    Yogi's test causes no increased pain on either side.    Supine straight leg raise is negative bilaterally.    Internal and external rotation of the hip causes no increased pain on either side.  Myofascial exam:  Moderate tenderness to palpation across right lumbar paraspinous muscles.      MENTAL STATUS: normal orientation, speech, language, and fund of knowledge for social situation.  Emotional state appropriate.    CRANIAL NERVES:  II:  PERRL bilaterally,   III,IV,VI: EOMI.    V:  Facial sensation equal bilaterally  VII: "  Facial motor function normal.  VIII:  Hearing equal to finger rub bilaterally  IX/X: Gag normal, palate symmetric  XI:  Shoulder shrug equal, head turn equal  XII:  Tongue midline without fasciculations      MOTOR: Tone and bulk: normal bilateral upper and lower Strength: normal   Delt Bi Tri WE WF     R 5 5 5 5 5 5   L 5 5 5 5 5 5     IP ADD ABD Quad TA Gas HAM  R 5 5 5 5 5 5 5  L 5 5 5 5 5 5 5    SENSATION: Light touch and pinprick intact bilaterally  REFLEXES: normal, symmetric, nonbrisk.  Toes down, no clonus. No hoffmans.  GAIT: normal rise, base, steps, and arm swing.        Imaging:  MRI L-spine 4/2019  L1-L2, there is diffuse disc bulge along with facet hypertrophy and ligamentum flavum hypertrophy.  There is superimposed central disc protrusion.  There is moderate narrowing of the spinal canal.  The neural foramina are unremarkable.    L2-L3, there is diffuse disc bulge along with facet hypertrophy and ligamentum flavum hypertrophy.  The spinal canal is within normal limits.  There is mild bilateral neural foraminal narrowing.    L3-L4, there is a left-sided postlaminectomy changes at this level.  There is diffuse disc bulge along with facet hypertrophy and ligamentum flavum hypertrophy.  There is small amount of soft tissue along the central aspect of the spinal canal adjacent to the disc.  The spinal canal is within normal limits.  There is mild bilateral neural foraminal narrowing.    L4-5, there is a left-sided post laminectomy changes at this level.  There is diffuse disc bulge along with facet hypertrophy and ligamentum flavum hypertrophy.  There is small amount of soft tissue along the central aspect of the spinal canal adjacent to the disc.  The spinal canal is within normal limits.  There is mild bilateral neural foraminal narrowing.    L5-S1, there is a left-sided post laminectomy changes at this level.  There is diffuse disc bulge along with facet hypertrophy and ligamentum flavum  hypertrophy.  The spinal canal is within normal limits.  There is mild bilateral neural foraminal narrowing.    There are postoperative changes involving the paraspinous soft tissues.  There are simple appearing bilateral renal cysts.  Both kidneys appear atrophic.  The remainder of the retroperitoneal structures are within normal limits.    Assessment:  Patient referred for right-sided lower back pain  1. Other spondylosis, lumbar region    2. DDD (degenerative disc disease), lumbar    3. Postlaminectomy syndrome of lumbar region          Plan:  1. I have stressed the importance of physical activity and exercise to improve overall health  2. Reviewed pertinent imaging and records with patient  3. I believe her low back pain maybe due to facet arthropathy and have recommended lumbar medial branch blocks as a diagnostic procedure.  If successful, would proceed with radiofrequency ablation.  4. Follow up after procedure      Thank you for referring this interesting patient, and I look forward to continuing to collaborate in her care.

## 2019-09-09 NOTE — PROGRESS NOTES
This note was completed with dictation software and grammatical errors may exist.    Referring Physician: Maite Alfonso PA-C    PCP: Primary Doctor No      CC:  Right-sided lower back pain    HPI:   Andra Newell is a 59 y.o. female referred to us for right-sided lower back pain. Pain has worsened over the past 2 months.  No recent traumatic incident.  She does have history of kidney transplant performed in January 2019.  She presents to us with constant aching, throbbing pain over her mid to lower back.  Pain is predominantly on the right side.  Pain does not radiate down her lower extremities. Pain worsens with standing, walking, extension, getting up.  She has tried physical therapy and massage therapy with minimal benefit.  She has not had any interventional procedures.  She recently had updated imaging of her lumbar spine.  She denies any worsening weakness.  No bowel bladder changes.    ROS:  CONSTITUTIONAL: No fevers, chills, night sweats, wt. loss, appetite changes  SKIN: no rashes or itching  ENT: No headaches, head trauma, vision changes, or eye pain  LYMPH NODES: None noticed   CV: No chest pain, palpitations.   RESP: No shortness of breath, dyspnea on exertion, cough, wheezing, or hemoptysis  GI: No nausea, emesis, diarrhea, constipation, melena, hematochezia, pain.    : No dysuria, hematuria, urgency, or frequency   HEME: No easy bruising, bleeding problems  PSYCHIATRIC: No depression, anxiety, psychosis, hallucinations.  NEURO: No seizures, memory loss, dizziness or difficulty sleeping  MSK:  Positive HPI      Past Medical History:   Diagnosis Date    Anemia     Anemia in chronic kidney disease, on chronic dialysis 7/12/2017    Aplastic anemia with parvovirus B19 infection 5/27/2019    Arthritis     Asthma     allergic airway    CKD stage III (moderate) 2/18/2019    Colon polyp     Depression     Diabetes mellitus     Diverticulosis     Eosinophilia     ESRD (end stage renal  disease)     stage V, due for living donor 9/2018    ESRD on dialysis     GERD (gastroesophageal reflux disease)     Gout     Gout     Hyperlipidemia     Hypertension     Hypertriglyceridemia without hypercholesterolemia 6/9/2019    Low back pain     Low HDL (under 40) 6/9/2019    MRSA carrier     Neutropenia, unspecified 5/8/2019    Obesity     Renal disorder     Rotator cuff syndrome--left     Thyroid disease     Ulnar neuropathy of right upper extremity     Uncontrolled type 2 diabetes mellitus with hyperglycemia      Past Surgical History:   Procedure Laterality Date    ACHILLES TENDON SURGERY Left May 2015    ARTHROSCOPY, HIP Left 10/3/2013    [facility]    ARTHROSCOPY-HIP WITH TROCHANTERIC BURSECTOMY Left 10/3/2013    [facility]    ARTHROSCOPY-SHOULDER Left 11/24/2015    [facility]    ARTHROSCOPY-SHOULDER WITH SUBACROMIAL DECOMPRESSION Left 7/12/2016    [facility]    BACK SURGERY      CHOLECYSTECTOMY      COLONOSCOPY N/A 5/9/2019    [facility]formed by [facility] at [facility] ENDO (2ND FLR)    COLONOSCOPY N/A 9/6/2017    [facility]    DEBRIDEMENT-SHOULDER-ARTHROSCOPIC Left 7/12/2016    [facility]    DEBRIDEMENT-SHOULDER-ARTHROSCOPIC; LABRAL Left 11/24/2015    [facility]    DIALYSIS FISTULA CREATION  12/2017    EGD (ESOPHAGOGASTRODUODENOSCOPY) N/A 5/9/2019    [facility] END[facility] (2ND FLR)    FEMOROPLASTY, ARTHROSCOPIC Left 10/3/2013    [facility]    HEMORRHOID SURGERY      INJECTION-AMNIOX Left 7/12/2016    [facility]    JOINT REPLACEMENT      left    KNEE SURGERY      LYSIS-ADHESION Left 11/24/2015    [facility]    REPAIR ROTATOR CUFF  ARTHROSCOPIC Left 7/12/2016    [facility]    REPAIR-TENDON-BICEP Left 11/24/2015    [facility]    SHOULDER ARTHROSCOPY      SHOULDER SURGERY      TRANSPLANT, KIDNEY N/A 1/14/2019    [facility]    UPPER GASTROINTESTINAL ENDOSCOPY  ~2013     [facility]irt     Family History   Problem Relation Age of Onset    Diabetes Mother     Alzheimer's disease Mother     Thyroid disease Mother     Hyperlipidemia Mother     Heart disease Father     Stroke Father     Diabetes Sister     Lupus Sister     Ovarian cancer Sister     Heart disease Brother     No Known Problems Son     Diabetes Maternal Grandmother     Hypertension Maternal Grandmother     No Known Problems Paternal Grandmother     No Known Problems Paternal Grandfather     COPD Maternal Aunt     Diabetes Maternal Aunt     Heart disease Maternal Aunt     Hypertension Maternal Aunt     No Known Problems Maternal Uncle     No Known Problems Paternal Aunt     No Known Problems Paternal Uncle     Colon cancer Neg Hx     Colon polyps Neg Hx     Crohn's disease Neg Hx     Ulcerative colitis Neg Hx     Celiac disease Neg Hx      Social History     Socioeconomic History    Marital status: Significant Other     Spouse name: Not on file    Number of children: 2    Years of education: Not on file    Highest education level: Not on file   Occupational History    Occupation: retired     Comment:    Social Needs    Financial resource strain: Not on file    Food insecurity:     Worry: Not on file     Inability: Not on file    Transportation needs:     Medical: Not on file     Non-medical: Not on file   Tobacco Use    Smoking status: Never Smoker    Smokeless tobacco: Never Used   Substance and Sexual Activity    Alcohol use: No    Drug use: No    Sexual activity: Never   Lifestyle    Physical activity:     Days per week:  "Not on file     Minutes per session: Not on file    Stress: Not on file   Relationships    Social connections:     Talks on phone: Not on file     Gets together: Not on file     Attends Yazdanism service: Not on file     Active member of club or organization: Not on file     Attends meetings of clubs or organizations: Not on file     Relationship status: Not on file   Other Topics Concern    Not on file   Social History Narrative    Not on file         Medications/Allergies: See med card    Vitals:    09/09/19 0823   BP: 128/75   Pulse: 76   Weight: 94.3 kg (208 lb)   Height: 5' 7" (1.702 m)   PainSc: 10-Worst pain ever   PainLoc: Back         Physical exam:    GENERAL: A and O x3, the patient appears well groomed and is in no acute distress.  Skin: No rashes or obvious lesions  HEENT: normocephalic, atraumatic  CARDIOVASCULAR:  Palpable peripheral pulses  LUNGS: easy work of breathing  ABDOMEN: soft, nontender   UPPER EXTREMITIES: Normal alignment, normal range of motion, no atrophy, no skin changes,  hair growth and nail growth normal and equal bilaterally. No swelling, no tenderness.    LOWER EXTREMITIES:  Normal alignment, normal range of motion, no atrophy, no skin changes,  hair growth and nail growth normal and equal bilaterally. No swelling, no tenderness.  LUMBAR SPINE  Lumbar spine: ROM is limited with flexion extension and oblique extension with moderate increased pain.    Yogi's test causes no increased pain on either side.    Supine straight leg raise is negative bilaterally.    Internal and external rotation of the hip causes no increased pain on either side.  Myofascial exam:  Moderate tenderness to palpation across right lumbar paraspinous muscles.      MENTAL STATUS: normal orientation, speech, language, and fund of knowledge for social situation.  Emotional state appropriate.    CRANIAL NERVES:  II:  PERRL bilaterally,   III,IV,VI: EOMI.    V:  Facial sensation equal bilaterally  VII: "  Facial motor function normal.  VIII:  Hearing equal to finger rub bilaterally  IX/X: Gag normal, palate symmetric  XI:  Shoulder shrug equal, head turn equal  XII:  Tongue midline without fasciculations      MOTOR: Tone and bulk: normal bilateral upper and lower Strength: normal   Delt Bi Tri WE WF     R 5 5 5 5 5 5   L 5 5 5 5 5 5     IP ADD ABD Quad TA Gas HAM  R 5 5 5 5 5 5 5  L 5 5 5 5 5 5 5    SENSATION: Light touch and pinprick intact bilaterally  REFLEXES: normal, symmetric, nonbrisk.  Toes down, no clonus. No hoffmans.  GAIT: normal rise, base, steps, and arm swing.        Imaging:  MRI L-spine 4/2019  L1-L2, there is diffuse disc bulge along with facet hypertrophy and ligamentum flavum hypertrophy.  There is superimposed central disc protrusion.  There is moderate narrowing of the spinal canal.  The neural foramina are unremarkable.    L2-L3, there is diffuse disc bulge along with facet hypertrophy and ligamentum flavum hypertrophy.  The spinal canal is within normal limits.  There is mild bilateral neural foraminal narrowing.    L3-L4, there is a left-sided postlaminectomy changes at this level.  There is diffuse disc bulge along with facet hypertrophy and ligamentum flavum hypertrophy.  There is small amount of soft tissue along the central aspect of the spinal canal adjacent to the disc.  The spinal canal is within normal limits.  There is mild bilateral neural foraminal narrowing.    L4-5, there is a left-sided post laminectomy changes at this level.  There is diffuse disc bulge along with facet hypertrophy and ligamentum flavum hypertrophy.  There is small amount of soft tissue along the central aspect of the spinal canal adjacent to the disc.  The spinal canal is within normal limits.  There is mild bilateral neural foraminal narrowing.    L5-S1, there is a left-sided post laminectomy changes at this level.  There is diffuse disc bulge along with facet hypertrophy and ligamentum flavum  hypertrophy.  The spinal canal is within normal limits.  There is mild bilateral neural foraminal narrowing.    There are postoperative changes involving the paraspinous soft tissues.  There are simple appearing bilateral renal cysts.  Both kidneys appear atrophic.  The remainder of the retroperitoneal structures are within normal limits.    Assessment:  Patient referred for right-sided lower back pain  1. Other spondylosis, lumbar region    2. DDD (degenerative disc disease), lumbar    3. Postlaminectomy syndrome of lumbar region          Plan:  1. I have stressed the importance of physical activity and exercise to improve overall health  2. Reviewed pertinent imaging and records with patient  3. I believe her low back pain maybe due to facet arthropathy and have recommended lumbar medial branch blocks as a diagnostic procedure.  If successful, would proceed with radiofrequency ablation.  4. Follow up after procedure      Thank you for referring this interesting patient, and I look forward to continuing to collaborate in her care.

## 2019-09-10 ENCOUNTER — PATIENT MESSAGE (OUTPATIENT)
Dept: TRANSPLANT | Facility: CLINIC | Age: 59
End: 2019-09-10

## 2019-09-12 DIAGNOSIS — M54.50 ACUTE BILATERAL LOW BACK PAIN WITHOUT SCIATICA: Primary | ICD-10-CM

## 2019-09-13 ENCOUNTER — TELEPHONE (OUTPATIENT)
Dept: PAIN MEDICINE | Facility: CLINIC | Age: 59
End: 2019-09-13

## 2019-09-13 NOTE — TELEPHONE ENCOUNTER
----- Message from Renetta Negron sent at 9/13/2019  4:07 PM CDT -----  Contact: self   Patient want to know if you can call her something in for back pain if so please send to [facility], any questions please call back at [phone] (home)     [id number] - [place] - [address]  [address] [place] [address]  Phone: [phone] Fax: [phone]

## 2019-09-16 ENCOUNTER — TELEPHONE (OUTPATIENT)
Dept: PAIN MEDICINE | Facility: CLINIC | Age: 59
End: 2019-09-16

## 2019-09-16 ENCOUNTER — LAB VISIT (OUTPATIENT)
Dept: LAB | Facility: HOSPITAL | Age: 59
End: 2019-09-16
Attending: INTERNAL MEDICINE
Payer: MEDICARE

## 2019-09-16 DIAGNOSIS — Z20.828 PARVOVIRUS EXPOSURE: ICD-10-CM

## 2019-09-16 DIAGNOSIS — Z94.0 KIDNEY REPLACED BY TRANSPLANT: ICD-10-CM

## 2019-09-16 LAB
ALBUMIN SERPL BCP-MCNC: 3.8 G/DL (ref 3.5–5.2)
ANION GAP SERPL CALC-SCNC: 7 MMOL/L (ref 8–16)
BASOPHILS # BLD AUTO: 0.03 K/UL (ref 0–0.2)
BASOPHILS NFR BLD: 0.6 % (ref 0–1.9)
BUN SERPL-MCNC: 21 MG/DL (ref 8–23)
CALCIUM SERPL-MCNC: 9.2 MG/DL (ref 8.7–10.5)
CHLORIDE SERPL-SCNC: 105 MMOL/L (ref 95–110)
CO2 SERPL-SCNC: 29 MMOL/L (ref 23–29)
CREAT SERPL-MCNC: 1.2 MG/DL (ref 0.5–1.4)
DIFFERENTIAL METHOD: ABNORMAL
EOSINOPHIL # BLD AUTO: 0.3 K/UL (ref 0–0.5)
EOSINOPHIL NFR BLD: 5.3 % (ref 0–8)
ERYTHROCYTE [DISTWIDTH] IN BLOOD BY AUTOMATED COUNT: 13.3 % (ref 11.5–14.5)
EST. GFR  (AFRICAN AMERICAN): 56.4 ML/MIN/1.73 M^2
EST. GFR  (NON AFRICAN AMERICAN): 48.9 ML/MIN/1.73 M^2
GLUCOSE SERPL-MCNC: 210 MG/DL (ref 70–110)
HCT VFR BLD AUTO: 37.3 % (ref 37–48.5)
HGB BLD-MCNC: 11.9 G/DL (ref 12–16)
IMM GRANULOCYTES # BLD AUTO: 0.03 K/UL (ref 0–0.04)
IMM GRANULOCYTES NFR BLD AUTO: 0.6 % (ref 0–0.5)
LYMPHOCYTES # BLD AUTO: 0.6 K/UL (ref 1–4.8)
LYMPHOCYTES NFR BLD: 10.9 % (ref 18–48)
MAGNESIUM SERPL-MCNC: 1.7 MG/DL (ref 1.6–2.6)
MCH RBC QN AUTO: 31.2 PG (ref 27–31)
MCHC RBC AUTO-ENTMCNC: 31.9 G/DL (ref 32–36)
MCV RBC AUTO: 98 FL (ref 82–98)
MONOCYTES # BLD AUTO: 0.4 K/UL (ref 0.3–1)
MONOCYTES NFR BLD: 7.7 % (ref 4–15)
NEUTROPHILS # BLD AUTO: 4 K/UL (ref 1.8–7.7)
NEUTROPHILS NFR BLD: 74.9 % (ref 38–73)
NRBC BLD-RTO: 0 /100 WBC
PHOSPHATE SERPL-MCNC: 3.6 MG/DL (ref 2.7–4.5)
PLATELET # BLD AUTO: 190 K/UL (ref 150–350)
PMV BLD AUTO: 9.8 FL (ref 9.2–12.9)
POTASSIUM SERPL-SCNC: 4.6 MMOL/L (ref 3.5–5.1)
RBC # BLD AUTO: 3.82 M/UL (ref 4–5.4)
SODIUM SERPL-SCNC: 141 MMOL/L (ref 136–145)
TACROLIMUS BLD-MCNC: 8 NG/ML (ref 5–15)
WBC # BLD AUTO: 5.32 K/UL (ref 3.9–12.7)

## 2019-09-16 PROCEDURE — 87799 DETECT AGENT NOS DNA QUANT: CPT

## 2019-09-16 PROCEDURE — 80069 RENAL FUNCTION PANEL: CPT

## 2019-09-16 PROCEDURE — 86352 CELL FUNCTION ASSAY W/STIM: CPT

## 2019-09-16 PROCEDURE — 80197 ASSAY OF TACROLIMUS: CPT

## 2019-09-16 PROCEDURE — 83735 ASSAY OF MAGNESIUM: CPT

## 2019-09-16 PROCEDURE — 85025 COMPLETE CBC W/AUTO DIFF WBC: CPT

## 2019-09-16 PROCEDURE — 36415 COLL VENOUS BLD VENIPUNCTURE: CPT | Mod: PO

## 2019-09-16 RX ORDER — TRAMADOL HYDROCHLORIDE 50 MG/1
50 TABLET ORAL EVERY 6 HOURS PRN
Qty: 28 TABLET | Refills: 0 | Status: SHIPPED | OUTPATIENT
Start: 2019-09-16 | End: 2019-10-28

## 2019-09-16 NOTE — TELEPHONE ENCOUNTER
----- Message from Sita Suresh sent at 9/14/2019 10:02 AM CDT -----  Contact: mary/wife  Type: Needs Medical Advice    Who Called:  Mary  Symptoms (please be specific):  na  How long has patient had these symptoms:  na  Pharmacy name and phone #:  [facility]  Best Call Back Number: [phone]  Additional Information: Patient is in extreme back pain, patient has already visited the ER. Please have Dr. Ferrer call as soon as possible, Also wife states that patient called yesterday and did not receive a call back.  Please call as soon as possible. Thanks!

## 2019-09-16 NOTE — TELEPHONE ENCOUNTER
----- Message from Kira Mcdaniel sent at 9/16/2019  3:53 PM CDT -----  Contact: Soniya with [facility]  Type:  Pharmacy Calling to Clarify an RX    Name of Caller:  Soniya  Pharmacy Name:  [facility]  Prescription Name:  Tramadol  What do they need to clarify?:  Drug interaction  Best Call Back Number:  [phone]  Additional Information:  Please call Soniya to discuss. Thanks!

## 2019-09-17 ENCOUNTER — PATIENT MESSAGE (OUTPATIENT)
Dept: PAIN MEDICINE | Facility: CLINIC | Age: 59
End: 2019-09-17

## 2019-09-17 LAB
LEFT EYE DM RETINOPATHY: NEGATIVE
RIGHT EYE DM RETINOPATHY: NEGATIVE

## 2019-09-19 LAB — IMMUNKNOW (STIMULATED): 91 NG/ML (ref 226–524)

## 2019-09-20 LAB — B19V DNA # SPEC NAA+PROBE: 574 COPIES/ML

## 2019-09-24 ENCOUNTER — TELEPHONE (OUTPATIENT)
Dept: SPINE | Facility: CLINIC | Age: 59
End: 2019-09-24

## 2019-09-25 ENCOUNTER — HOSPITAL ENCOUNTER (OUTPATIENT)
Facility: AMBULARY SURGERY CENTER | Age: 59
Discharge: HOME | End: 2019-09-25
Attending: ANESTHESIOLOGY | Admitting: ANESTHESIOLOGY
Payer: MEDICARE

## 2019-09-25 DIAGNOSIS — M47.896 OTHER SPONDYLOSIS, LUMBAR REGION: Primary | ICD-10-CM

## 2019-09-25 PROCEDURE — 64493 PR INJ DX/THER AGNT PARAVERT FACET JOINT,IMG GUIDE,LUMBAR/SAC,1ST LVL: ICD-10-PCS | Mod: RT,,, | Performed by: ANESTHESIOLOGY

## 2019-09-25 PROCEDURE — 64494 INJ PARAVERT F JNT L/S 2 LEV: CPT | Mod: RT,,, | Performed by: ANESTHESIOLOGY

## 2019-09-25 PROCEDURE — 64495 INJ PARAVERT F JNT L/S 3 LEV: CPT | Mod: RT,,, | Performed by: ANESTHESIOLOGY

## 2019-09-25 PROCEDURE — 64494 INJ PARAVERT F JNT L/S 2 LEV: CPT | Performed by: ANESTHESIOLOGY

## 2019-09-25 PROCEDURE — 64493 INJ PARAVERT F JNT L/S 1 LEV: CPT | Performed by: ANESTHESIOLOGY

## 2019-09-25 PROCEDURE — 64494 PR INJ DX/THER AGNT PARAVERT FACET JOINT,IMG GUIDE,LUMBAR/SAC, 2ND LEVEL: ICD-10-PCS | Mod: RT,,, | Performed by: ANESTHESIOLOGY

## 2019-09-25 PROCEDURE — 64493 INJ PARAVERT F JNT L/S 1 LEV: CPT | Mod: RT,,, | Performed by: ANESTHESIOLOGY

## 2019-09-25 PROCEDURE — 64495 PR INJ DX/THER AGNT PARAVERT FACET JOINT,IMG GUIDE,LUMBAR/SAC, ADD LEVEL: ICD-10-PCS | Mod: RT,,, | Performed by: ANESTHESIOLOGY

## 2019-09-25 PROCEDURE — 64495 INJ PARAVERT F JNT L/S 3 LEV: CPT | Performed by: ANESTHESIOLOGY

## 2019-09-25 RX ORDER — HYDROCODONE BITARTRATE AND ACETAMINOPHEN 10; 325 MG/1; MG/1
1 TABLET ORAL EVERY 4 HOURS PRN
Qty: 40 TABLET | Refills: 0 | Status: SHIPPED | OUTPATIENT
Start: 2019-09-25 | End: 2019-10-25

## 2019-09-25 RX ORDER — BUPIVACAINE HYDROCHLORIDE 5 MG/ML
INJECTION, SOLUTION EPIDURAL; INTRACAUDAL
Status: DISCONTINUED | OUTPATIENT
Start: 2019-09-25 | End: 2019-09-25 | Stop reason: HOSPADM

## 2019-09-25 RX ORDER — SODIUM CHLORIDE, SODIUM LACTATE, POTASSIUM CHLORIDE, CALCIUM CHLORIDE 600; 310; 30; 20 MG/100ML; MG/100ML; MG/100ML; MG/100ML
INJECTION, SOLUTION INTRAVENOUS ONCE AS NEEDED
Status: DISCONTINUED | OUTPATIENT
Start: 2019-09-25 | End: 2019-09-25 | Stop reason: HOSPADM

## 2019-09-25 NOTE — OP NOTE
PROCEDURE DATE: 9/25/2019    PROCEDURE: Right L2,3,4,5 medial branch nerve blocks    DIAGNOSIS:  Other lumbar spondylosis    Post Op diagnosis: Same    PHYSICIAN: Yonny Ferrer MD    MEDICATIONS INJECTED: 0.5% bupivicaine, 0.5 ml at each level    SEDATION MEDICATIONS:None    LOCAL ANESTHETIC USED: None    ESTIMATED BLOOD LOSS:  NOne    COMPLICATIONS:  None    TECHNIQUE: A time out was taken to identify the patient, procedure and side of the procedure. The patient was placed in a prone position, then prepped and draped in the usual sterile fashion using ChloraPrep and sterile towels.  The levels were determined under fluoroscopic guidance and then marked.  A 25-gauge 3.5 inch needle was introduced to the anatomic location of the L2,3,4,5 medial branch nerves on the right side. The above medication was then injected. The patient tolerated the procedure well.     The patient was monitored after the procedure. Patient was given pain diary to record pain levels at home.     If found to have greater than a 50% recovery and so will be scheduled for a radiofrequency ablation of the corresponding nerves.  Patient was given post procedure and discharge instructions to follow at home.  The patient was discharged in a stable condition.

## 2019-09-25 NOTE — DISCHARGE SUMMARY
[facility]  Discharge Note  Short Stay    Admit Date: 9/25/2019    Discharge Date and Time: 9/25/2019    Attending Physician: Yonny Ferrer MD     Discharge Provider: Yonny Ferrer    Diagnoses:  Active Hospital Problems    Diagnosis  POA    *Other spondylosis, lumbar region [M47.896]  Yes      Resolved Hospital Problems   No resolved problems to display.       Hospital Course: Lumbar MBB  Discharged Condition: Good    Final Diagnoses:   Active Hospital Problems    Diagnosis  POA    *Other spondylosis, lumbar region [M47.896]  Yes      Resolved Hospital Problems   No resolved problems to display.       Disposition: Home or Self Care    Follow up/Patient Instructions:    Medications:  Reconciled Home Medications:      Medication List      START taking these medications    HYDROcodone-acetaminophen  mg per tablet  Commonly known as:  NORCO  Take 1 tablet by mouth every 4 (four) hours as needed (pain).        CHANGE how you take these medications    insulin aspart U-100 100 unit/mL (3 mL) Inpn pen  Commonly known as:  NovoLOG Flexpen U-100 Insulin  24 units AC + correction scale. Max TDD: 92 units  What changed:  additional instructions        CONTINUE taking these medications    blood sugar diagnostic Strp  Commonly known as:  BLOOD GLUCOSE TEST  Checks her bg 4 times a day   e11.9     diclofenac sodium 1 % Gel  Commonly known as:  VOLTAREN  Apply 2 g topically 4 (four) times daily as needed. For pain     insulin degludec 200 unit/mL (3 mL) Inpn  Commonly known as:  TRESIBA FLEXTOUCH U-200  Inject 80 Units into the skin every evening.     levothyroxine 75 MCG tablet  Commonly known as:  SYNTHROID  Take 1 tablet (75 mcg total) by mouth once daily.     magnesium oxide 400 mg (241.3 mg magnesium) tablet  Commonly known as:  MAG-OX  Take 2 tablets (800 mg total) by mouth once daily.     methocarbamol 750 MG Tab  Commonly known as:  ROBAXIN-750  Take 1 tablet (750 mg total) by mouth every 8 (eight) hours as  needed (muscle spasm/ pain).     multivitamin tablet  Commonly known as:  THERAGRAN  Take 1 tablet by mouth once daily.     mycophenolate 250 mg Cap  Commonly known as:  CELLCEPT  Take 1 capsule (250 mg total) by mouth 2 (two) times daily.     oxybutynin 10 MG 24 hr tablet  Commonly known as:  DITROPAN-XL     pantoprazole 40 MG tablet  Commonly known as:  PROTONIX  Take 1 tablet (40 mg total) by mouth once daily.     rosuvastatin 10 MG tablet  Commonly known as:  CRESTOR  Take 1 tablet (10 mg total) by mouth once daily.     sulfamethoxazole-trimethoprim 400-80mg 400-80 mg per tablet  Commonly known as:  BACTRIM,SEPTRA  Take 1 tablet by mouth once daily.     * tacrolimus 0.5 MG Cap  Commonly known as:  PROGRAF  Take 1 capsule (0.5 mg total) by mouth every evening. Total 2mg in AM PO and 1.5mg PO in PM Z94.0     * tacrolimus 1 MG Cap  Commonly known as:  PROGRAF  Take 2 capsules (2 mg total) by mouth every morning AND 1 capsule (1 mg total) every evening. Total 2mg in AM PO and 1.5mg in PM PO. Z94.0.     traMADol 50 mg tablet  Commonly known as:  ULTRAM  Take 1 tablet (50 mg total) by mouth every 6 (six) hours as needed for Pain.     vilazodone 40 mg Tab tablet  Commonly known as:  VIIBRYD  Take 1 tablet (40 mg total) by mouth once daily.         * This list has 2 medication(s) that are the same as other medications prescribed for you. Read the directions carefully, and ask your doctor or other care provider to review them with you.              Discharge Procedure Orders   Call MD for:  temperature >100.4     Call MD for:  persistent nausea and vomiting or diarrhea     Call MD for:  severe uncontrolled pain     Call MD for:  redness, tenderness, or signs of infection (pain, swelling, redness, odor or green/yellow discharge around incision site)     Call MD for:  difficulty breathing or increased cough     Call MD for:  severe persistent headache        Follow up with MD in 2-3 weeks    Discharge Procedure Orders  (must include Diet, Follow-up, Activity):   Discharge Procedure Orders (must include Diet, Follow-up, Activity)   Call MD for:  temperature >100.4     Call MD for:  persistent nausea and vomiting or diarrhea     Call MD for:  severe uncontrolled pain     Call MD for:  redness, tenderness, or signs of infection (pain, swelling, redness, odor or green/yellow discharge around incision site)     Call MD for:  difficulty breathing or increased cough     Call MD for:  severe persistent headache

## 2019-09-26 VITALS
WEIGHT: 207.88 LBS | OXYGEN SATURATION: 94 % | SYSTOLIC BLOOD PRESSURE: 148 MMHG | BODY MASS INDEX: 32.63 KG/M2 | DIASTOLIC BLOOD PRESSURE: 66 MMHG | HEIGHT: 67 IN | TEMPERATURE: 99 F | HEART RATE: 76 BPM | RESPIRATION RATE: 18 BRPM

## 2019-09-30 ENCOUNTER — TELEPHONE (OUTPATIENT)
Dept: PAIN MEDICINE | Facility: CLINIC | Age: 59
End: 2019-09-30

## 2019-09-30 ENCOUNTER — LAB VISIT (OUTPATIENT)
Dept: LAB | Facility: HOSPITAL | Age: 59
End: 2019-09-30
Attending: INTERNAL MEDICINE
Payer: MEDICARE

## 2019-09-30 ENCOUNTER — RESEARCH ENCOUNTER (OUTPATIENT)
Dept: RESEARCH | Facility: HOSPITAL | Age: 59
End: 2019-09-30

## 2019-09-30 VITALS
SYSTOLIC BLOOD PRESSURE: 140 MMHG | BODY MASS INDEX: 34.05 KG/M2 | WEIGHT: 217.38 LBS | HEART RATE: 71 BPM | RESPIRATION RATE: 18 BRPM | TEMPERATURE: 98 F | DIASTOLIC BLOOD PRESSURE: 87 MMHG

## 2019-09-30 DIAGNOSIS — Z00.6 RESEARCH STUDY PATIENT: ICD-10-CM

## 2019-09-30 DIAGNOSIS — Z94.0 KIDNEY REPLACED BY TRANSPLANT: ICD-10-CM

## 2019-09-30 DIAGNOSIS — Z94.0 STATUS POST LIVING-DONOR KIDNEY TRANSPLANTATION: Primary | ICD-10-CM

## 2019-09-30 DIAGNOSIS — B34.3 PARVOVIRUS B19 INFECTION: ICD-10-CM

## 2019-09-30 DIAGNOSIS — M47.896 OTHER SPONDYLOSIS, LUMBAR REGION: ICD-10-CM

## 2019-09-30 DIAGNOSIS — Z20.828 PARVOVIRUS EXPOSURE: ICD-10-CM

## 2019-09-30 DIAGNOSIS — Z94.0 STATUS POST LIVING-DONOR KIDNEY TRANSPLANTATION: ICD-10-CM

## 2019-09-30 LAB
ALBUMIN SERPL BCP-MCNC: 3.9 G/DL (ref 3.5–5.2)
ANION GAP SERPL CALC-SCNC: 9 MMOL/L (ref 8–16)
BASOPHILS # BLD AUTO: 0.02 K/UL (ref 0–0.2)
BASOPHILS NFR BLD: 0.4 % (ref 0–1.9)
BUN SERPL-MCNC: 20 MG/DL (ref 8–23)
CALCIUM SERPL-MCNC: 9.5 MG/DL (ref 8.7–10.5)
CHLORIDE SERPL-SCNC: 104 MMOL/L (ref 95–110)
CO2 SERPL-SCNC: 27 MMOL/L (ref 23–29)
CREAT SERPL-MCNC: 1.2 MG/DL (ref 0.5–1.4)
DIFFERENTIAL METHOD: ABNORMAL
DRUG STUDY SPECIMEN TYPE: NORMAL
DRUG STUDY TEST NAME: NORMAL
DRUG STUDY TEST RESULT: NORMAL
EOSINOPHIL # BLD AUTO: 0.3 K/UL (ref 0–0.5)
EOSINOPHIL NFR BLD: 5.3 % (ref 0–8)
ERYTHROCYTE [DISTWIDTH] IN BLOOD BY AUTOMATED COUNT: 13.5 % (ref 11.5–14.5)
EST. GFR  (AFRICAN AMERICAN): 56.4 ML/MIN/1.73 M^2
EST. GFR  (NON AFRICAN AMERICAN): 48.9 ML/MIN/1.73 M^2
GLUCOSE SERPL-MCNC: 244 MG/DL (ref 70–110)
HCT VFR BLD AUTO: 36 % (ref 37–48.5)
HGB BLD-MCNC: 11.7 G/DL (ref 12–16)
IMM GRANULOCYTES # BLD AUTO: 0.04 K/UL (ref 0–0.04)
IMM GRANULOCYTES NFR BLD AUTO: 0.7 % (ref 0–0.5)
LYMPHOCYTES # BLD AUTO: 0.6 K/UL (ref 1–4.8)
LYMPHOCYTES NFR BLD: 11.2 % (ref 18–48)
MAGNESIUM SERPL-MCNC: 1.7 MG/DL (ref 1.6–2.6)
MCH RBC QN AUTO: 31 PG (ref 27–31)
MCHC RBC AUTO-ENTMCNC: 32.5 G/DL (ref 32–36)
MCV RBC AUTO: 96 FL (ref 82–98)
MONOCYTES # BLD AUTO: 0.4 K/UL (ref 0.3–1)
MONOCYTES NFR BLD: 7.6 % (ref 4–15)
NEUTROPHILS # BLD AUTO: 4.1 K/UL (ref 1.8–7.7)
NEUTROPHILS NFR BLD: 74.8 % (ref 38–73)
NRBC BLD-RTO: 0 /100 WBC
PHOSPHATE SERPL-MCNC: 3.2 MG/DL (ref 2.7–4.5)
PLATELET # BLD AUTO: 184 K/UL (ref 150–350)
PMV BLD AUTO: 9.4 FL (ref 9.2–12.9)
POTASSIUM SERPL-SCNC: 4.4 MMOL/L (ref 3.5–5.1)
RBC # BLD AUTO: 3.77 M/UL (ref 4–5.4)
SODIUM SERPL-SCNC: 140 MMOL/L (ref 136–145)
TACROLIMUS BLD-MCNC: 6.6 NG/ML (ref 5–15)
WBC # BLD AUTO: 5.52 K/UL (ref 3.9–12.7)

## 2019-09-30 PROCEDURE — 99499 UNLISTED E&M SERVICE: CPT | Mod: ,,, | Performed by: CLINICAL NURSE SPECIALIST

## 2019-09-30 PROCEDURE — 80069 RENAL FUNCTION PANEL: CPT

## 2019-09-30 PROCEDURE — 80197 ASSAY OF TACROLIMUS: CPT

## 2019-09-30 PROCEDURE — 99499 NO LOS: ICD-10-PCS | Mod: ,,, | Performed by: CLINICAL NURSE SPECIALIST

## 2019-09-30 PROCEDURE — 36415 COLL VENOUS BLD VENIPUNCTURE: CPT

## 2019-09-30 PROCEDURE — 87799 DETECT AGENT NOS DNA QUANT: CPT

## 2019-09-30 PROCEDURE — 85025 COMPLETE CBC W/AUTO DIFF WBC: CPT

## 2019-09-30 PROCEDURE — 86352 CELL FUNCTION ASSAY W/STIM: CPT

## 2019-09-30 PROCEDURE — 99000 SPECIMEN HANDLING OFFICE-LAB: CPT

## 2019-09-30 PROCEDURE — 83735 ASSAY OF MAGNESIUM: CPT

## 2019-09-30 RX ORDER — ROSUVASTATIN CALCIUM 10 MG/1
TABLET, COATED ORAL
Qty: 90 TABLET | Refills: 0 | Status: SHIPPED | OUTPATIENT
Start: 2019-09-30 | End: 2019-11-19 | Stop reason: SDUPTHER

## 2019-09-30 NOTE — PROGRESS NOTES
[facility] CMV Prevention (Kidney Transplant)  Protocol: [id number]  IRB# 2017.512  PI: Nicholas GUAN)  Site: [id number]  Subject #[id number]     VISIT 15/ WEEK 36:          Date of Visit: 30SEP2019     Subject arrived at clinic to complete her Visit 15/Week 36. Subject agrees to continue her participation in the study and all questions answered. Subject reports on [id number] she had a procedure done that showed back pain is due to pinched nerve and is to have a procedure to help with pain due to this within next week. Is now taking Norco for pain.   The subject is in the follow up phase of the study    Current Outpatient Medications:     blood sugar diagnostic (BLOOD GLUCOSE TEST) Strp, Checks her bg 4 times a day   e11.9, Disp: 400 strip, Rfl: 4    diclofenac sodium (VOLTAREN) 1 % Gel, Apply 2 g topically 4 (four) times daily as needed. For pain, Disp: 1 Tube, Rfl: 0    HYDROcodone-acetaminophen (NORCO)  mg per tablet, Take 1 tablet by mouth every 4 (four) hours as needed (pain)., Disp: 40 tablet, Rfl: 0    insulin aspart U-100 (NOVOLOG FLEXPEN U-100 INSULIN) 100 unit/mL (3 mL) InPn pen, 24 units AC + correction scale. Max TDD: 92 units (Patient taking differently: 2 units AC + correction scale. Max TDD: 92 units), Disp: 30 mL, Rfl: 12    insulin degludec (TRESIBA FLEXTOUCH U-200) 200 unit/mL (3 mL) InPn, Inject 80 Units into the skin every evening., Disp: 27 mL, Rfl: 12    levothyroxine (SYNTHROID) 75 MCG tablet, Take 1 tablet (75 mcg total) by mouth once daily., Disp: 30 tablet, Rfl: 4    magnesium oxide (MAG-OX) 400 mg (241.3 mg magnesium) tablet, Take 2 tablets (800 mg total) by mouth once daily., Disp: 60 tablet, Rfl: 11    methocarbamol (ROBAXIN-750) 750 MG Tab, Take 1 tablet (750 mg total) by mouth every 8 (eight) hours as needed (muscle spasm/ pain)., Disp: 40 tablet, Rfl: 0    multivitamin (THERAGRAN) tablet, Take 1 tablet by mouth once daily., Disp: , Rfl:     mycophenolate (CELLCEPT) 250 mg  Cap, Take 1 capsule (250 mg total) by mouth 2 (two) times daily., Disp: 60 capsule, Rfl: 11    oxybutynin (DITROPAN-XL) 10 MG 24 hr tablet, , Disp: , Rfl:     pantoprazole (PROTONIX) 40 MG tablet, Take 1 tablet (40 mg total) by mouth once daily., Disp: 30 tablet, Rfl: 11    rosuvastatin (CRESTOR) 10 MG tablet, Take 1 tablet (10 mg total) by mouth once daily., Disp: 90 tablet, Rfl: 1    sulfamethoxazole-trimethoprim 400-80mg (BACTRIM,SEPTRA) 400-80 mg per tablet, Take 1 tablet by mouth once daily., Disp: 30 tablet, Rfl: 6    tacrolimus (PROGRAF) 0.5 MG Cap, Take 1 capsule (0.5 mg total) by mouth every evening. Total 2mg in AM PO and 1.5mg PO in PM Z94.0, Disp: 30 capsule, Rfl: 11    tacrolimus (PROGRAF) 1 MG Cap, Take 2 capsules (2 mg total) by mouth every morning AND 1 capsule (1 mg total) every evening. Total 2mg in AM PO and 1.5mg in PM PO. Z94.0., Disp: 90 capsule, Rfl: 11    traMADol (ULTRAM) 50 mg tablet, Take 1 tablet (50 mg total) by mouth every 6 (six) hours as needed for Pain., Disp: 28 tablet, Rfl: 0    vilazodone (VIIBRYD) 40 mg Tab tablet, Take 1 tablet (40 mg total) by mouth once daily., Disp: 30 tablet, Rfl: 4    Current Facility-Administered Medications:     sodium chloride 0.9% bolus 2,000 mL, 2,000 mL, Intravenous, Once, Maureen Cabral MD    CMV Disease Assessment, Health Outcomes Assessment, Renal Transplant Outcomes, and Child Pierre Score completed. Patient agreed to continue to follow compliance in regards to effective birth control and donation of eggs as required by protocol. Patient in menopause since 2015 therefore urine pregnancy test not performed. Vital signs taken    Vitals:    09/30/19 0855   BP: (!) 140/87   Pulse: 71   Resp: 18   Temp: 98.3 °F (36.8 °C)   Weight: 98.6 kg (217 lb 6 oz)       Protocol Labs were drawn. Subject was fasting at time of draw. All specimens processed and shipped per protocol. Next clinic visit will occur on 28OCT2018 with labs prior.      Plan:  1. Study labs drawn  2. Study related procedure complete as per protocol  4. Subject to follow up with ID research on 10/28/19

## 2019-09-30 NOTE — TELEPHONE ENCOUNTER
----- Message from Mel Falk sent at 9/30/2019  2:51 PM CDT -----  Contact: Patient  Type: Needs Medical Advice    Who Called:  patient  Best Call Back Number: [phone] (home)   Additional Information: Patient said she has been waiting on a call to schedule a procedure and no one has called her yet she wants a call back today from the nurse.

## 2019-09-30 NOTE — TELEPHONE ENCOUNTER
----- Message from Jazmyne Andino sent at 9/30/2019  8:22 AM CDT -----  Type: Needs Medical Advice    Who Called:  Any Alvarez (significant other)  Symptoms (please be specific):  na  How long has patient had these symptoms:  richard  Pharmacy name and phone #:  richard  Best Call Back Number: [phone]  Additional Information: when is patient's next procedure going to be scheduled?/please advise

## 2019-10-01 ENCOUNTER — TELEPHONE (OUTPATIENT)
Dept: PAIN MEDICINE | Facility: CLINIC | Age: 59
End: 2019-10-01

## 2019-10-01 NOTE — PROGRESS NOTES
"CC: This 59 y.o. female presents for management of diabetes  and chronic conditions pending review including HTN, HLP, ESRD s/p kidney txp 01/14/2019, hypothyroidism, vitamin d deficiency, obesity     HPI: She  was diagnosed with T2DM in 2005. Has never been hospitalized r/t DM.  Family hx of DM: grandmother     Brings bg meter for review  Bg continue to be uncontrolled despite frequent insulin titration   Taking > 200 u insulin/day and bg continues to be mostly in 250-300s  hypoglycemia at home- x1, skipped meal              Diet: Eats 2-3  Meals a day, snacks- not often- appetite decreased since txp  May skip breakfast and have "brunch" bc she wakes up late  May have a small bag of chip in the afternoon  Exercise: none- rhizotomy October 15 ,   CURRENT DM MEDS:Tresiba 80 u qhs, Novolog 30 u AC + correction   Vial/pen:  Uses pens  Glucometer type:  Relion    Standards of Care:  Eye exam: 9/2019, No DR, [facility]    ROS:   Gen: Appetite good, +weight  Gain 9 lbs, + fatigue   Skin: Skin is intact and heals well, no rashes, no hair changes  Eyes: Denies visual disturbances  Resp: BOYER, no cough  Cardiac: No palpitations, chest pain, no edema   GI: + nausea - w back painor vomiting, diarrhea, constipation, or abdominal pain.  /GYN:  No nocturia, burning or pain.   MS/Neuro: Denies numbness/ tingling in BLE; Gait steady, speech clear  Psych: Denies drug/ETOH abuse + depression.  Other systems: negative.    PE:  GENERAL: Well developed, well nourished.  PSYCH: AAOx3, appropriate mood and affect, pleasant expression, conversant, appears relaxed, well groomed.   EYES: Conjunctiva, corneas clear    NECK: Supple, trachea midline  ABDOMEN: Soft, non-tender, non-distended   VASCULAR: DP pulses +2/4 bilaterally, no edema.  NEURO: Gait steady  SKIN: Skin warm and dry; no acanthosis nigracans.   FOOT EXAMINATION: 10/2/19  No foot deformity, corns or callus formation,  nails in good condiiton and well trimmed, no interspace " maceration or ulceration noted.  Decreased hair growth present over toes/feet.    Protective sensation intact with 10 gram monofilament.  +2 dorsalis pedis and posterior pulses noted.      Lab Results   Component Value Date    MICALBCREAT 1297.8 (H) 07/02/2018       Hemoglobin A1C   Date Value Ref Range Status   05/27/2019 7.1 (H) 4.0 - 5.6 % Final     Comment:     ADA Screening Guidelines:  5.7-6.4%  Consistent with prediabetes  >or=6.5%  Consistent with diabetes  High levels of fetal hemoglobin interfere with the HbA1C  assay. Heterozygous hemoglobin variants (HbS, HgC, etc)do  not significantly interfere with this assay.   However, presence of multiple variants may affect accuracy.     05/07/2019 8.9 (H) 4.0 - 5.6 % Final     Comment:     ADA Screening Guidelines:  5.7-6.4%  Consistent with prediabetes  >or=6.5%  Consistent with diabetes  High levels of fetal hemoglobin interfere with the HbA1C  assay. Heterozygous hemoglobin variants (HbS, HgC, etc)do  not significantly interfere with this assay.   However, presence of multiple variants may affect accuracy.     05/07/2019 8.9 (H) 4.0 - 5.6 % Final     Comment:     ADA Screening Guidelines:  5.7-6.4%  Consistent with prediabetes  >or=6.5%  Consistent with diabetes  High levels of fetal hemoglobin interfere with the HbA1C  assay. Heterozygous hemoglobin variants (HbS, HgC, etc)do  not significantly interfere with this assay.   However, presence of multiple variants may affect accuracy.          ASSESSMENT and PLAN:    1. T2DM with hyperglycemia, CKD 3-    D/c tresiba and novolog  Start U500 kwikpen 80 u tid   Log in a few days and in 1 week   Discussed A1c and BG goals.  Continue to test bg 4 times a day- log in 1 week for insulin titration or sooner for issues   Reviewed  hypoglycemia, s/s and appropriate tx.  RX for Continuous Glucose Monitor - order Cody  -Recommend [facility]  Continuous Glucose monitor                         Pt tests glucoses a minimum of 4 x a  day.                          Pt would benefit from therapeutic continuous glucose monitor to be able                         to make frequent insulin adjustments, based on current glucoses.   - takes statin    2. HTN - controlled, continue meds as previously prescribed and monitor.     3. HLP - on statin therapy, LFTs WNL    4. S/p kidney txp- followed by [facility]    5.  Immunosuppression - agents may increase bg readings    6. Hypothyroidism - takes with all other meds, check today    7. Vitamin d deficiency - continue ergo weekly     8. Obesity- Resume weight loss, exercise as tolerated Body mass index is 33.25 kg/m².         Follow-up: in 3 months with lab prior

## 2019-10-01 NOTE — TELEPHONE ENCOUNTER
----- Message from Comfort Zhong sent at 10/1/2019  1:31 PM CDT -----  Contact: self  Type:  Patient Returning Call    Who Called:  Patient   Who Left Message for Patient:  Lesly   Does the patient know what this is regarding?:  Yes to schedule procedure   Best Call Back Number:  [phone]  Additional Information:

## 2019-10-02 ENCOUNTER — LAB VISIT (OUTPATIENT)
Dept: LAB | Facility: HOSPITAL | Age: 59
End: 2019-10-02
Attending: NURSE PRACTITIONER
Payer: MEDICARE

## 2019-10-02 ENCOUNTER — PATIENT MESSAGE (OUTPATIENT)
Dept: PAIN MEDICINE | Facility: CLINIC | Age: 59
End: 2019-10-02

## 2019-10-02 ENCOUNTER — OFFICE VISIT (OUTPATIENT)
Dept: ENDOCRINOLOGY | Facility: CLINIC | Age: 59
End: 2019-10-02
Payer: MEDICARE

## 2019-10-02 VITALS
SYSTOLIC BLOOD PRESSURE: 120 MMHG | WEIGHT: 212.31 LBS | TEMPERATURE: 98 F | DIASTOLIC BLOOD PRESSURE: 68 MMHG | BODY MASS INDEX: 33.32 KG/M2 | HEART RATE: 76 BPM | HEIGHT: 67 IN

## 2019-10-02 DIAGNOSIS — E66.9 OBESITY (BMI 30-39.9): ICD-10-CM

## 2019-10-02 DIAGNOSIS — Z94.0 KIDNEY TRANSPLANT RECIPIENT: ICD-10-CM

## 2019-10-02 DIAGNOSIS — E78.1 HYPERTRIGLYCERIDEMIA WITHOUT HYPERCHOLESTEROLEMIA: ICD-10-CM

## 2019-10-02 DIAGNOSIS — I10 ESSENTIAL HYPERTENSION: ICD-10-CM

## 2019-10-02 DIAGNOSIS — E03.9 ACQUIRED HYPOTHYROIDISM: ICD-10-CM

## 2019-10-02 LAB — IMMUNKNOW (STIMULATED): 181 NG/ML (ref 226–524)

## 2019-10-02 PROCEDURE — 99999 PR PBB SHADOW E&M-EST. PATIENT-LVL IV: ICD-10-PCS | Mod: PBBFAC,,, | Performed by: NURSE PRACTITIONER

## 2019-10-02 PROCEDURE — 99214 OFFICE O/P EST MOD 30 MIN: CPT | Mod: S$GLB,,, | Performed by: NURSE PRACTITIONER

## 2019-10-02 PROCEDURE — 36415 COLL VENOUS BLD VENIPUNCTURE: CPT | Mod: PO

## 2019-10-02 PROCEDURE — 84443 ASSAY THYROID STIM HORMONE: CPT

## 2019-10-02 PROCEDURE — 99499 UNLISTED E&M SERVICE: CPT | Mod: S$GLB,,, | Performed by: NURSE PRACTITIONER

## 2019-10-02 PROCEDURE — 99499 RISK ADDL DX/OHS AUDIT: ICD-10-PCS | Mod: S$GLB,,, | Performed by: NURSE PRACTITIONER

## 2019-10-02 PROCEDURE — 83036 HEMOGLOBIN GLYCOSYLATED A1C: CPT

## 2019-10-02 PROCEDURE — 99999 PR PBB SHADOW E&M-EST. PATIENT-LVL IV: CPT | Mod: PBBFAC,,, | Performed by: NURSE PRACTITIONER

## 2019-10-02 PROCEDURE — 84439 ASSAY OF FREE THYROXINE: CPT

## 2019-10-02 PROCEDURE — 99214 PR OFFICE/OUTPT VISIT, EST, LEVL IV, 30-39 MIN: ICD-10-PCS | Mod: S$GLB,,, | Performed by: NURSE PRACTITIONER

## 2019-10-03 DIAGNOSIS — M47.896 OTHER SPONDYLOSIS, LUMBAR REGION: Primary | ICD-10-CM

## 2019-10-03 LAB
ESTIMATED AVG GLUCOSE: 197 MG/DL (ref 68–131)
HBA1C MFR BLD HPLC: 8.5 % (ref 4–5.6)
T4 FREE SERPL-MCNC: 0.95 NG/DL (ref 0.71–1.51)
TSH SERPL DL<=0.005 MIU/L-ACNC: 1.03 UIU/ML (ref 0.4–4)

## 2019-10-07 ENCOUNTER — PATIENT MESSAGE (OUTPATIENT)
Dept: ENDOCRINOLOGY | Facility: CLINIC | Age: 59
End: 2019-10-07

## 2019-10-07 LAB — B19V DNA # SPEC NAA+PROBE: 407 COPIES/ML

## 2019-10-10 ENCOUNTER — PATIENT MESSAGE (OUTPATIENT)
Dept: TRANSPLANT | Facility: CLINIC | Age: 59
End: 2019-10-10

## 2019-10-10 DIAGNOSIS — Z94.0 KIDNEY REPLACED BY TRANSPLANT: ICD-10-CM

## 2019-10-10 RX ORDER — MYCOPHENOLATE MOFETIL 250 MG/1
500 CAPSULE ORAL 2 TIMES DAILY
Qty: 120 CAPSULE | Refills: 11 | Status: SHIPPED | OUTPATIENT
Start: 2019-10-10 | End: 2020-01-06 | Stop reason: ALTCHOICE

## 2019-10-10 NOTE — TELEPHONE ENCOUNTER
----- Message from Maureen Cabral MD sent at 10/9/2019  7:16 PM CDT -----    Increase CellCept to 500 mg twice a day.  Repeat parvovirus PCR in 1 month.        ----- Message -----  From: Cammie Kessler MD  Sent: 10/9/2019   5:37 PM CDT  To: Maureen Cabral MD, #    Looks like her cell counts have improved - I would be okay with going down to routine lab monitoring per your protocol.     ----- Message -----  From: Maureen Cabral MD  Sent: 10/7/2019   7:22 PM CDT  To: Cammie Kessler MD, #    The following message sent to patient via [facility]: It appears parvovirus level has stabilized as low detectable. I will ask Dr. Kessler for any other recommendations (or if you need to see ID again). I suspect at least we can cut frequency of labs.

## 2019-10-11 ENCOUNTER — TELEPHONE (OUTPATIENT)
Dept: PAIN MEDICINE | Facility: CLINIC | Age: 59
End: 2019-10-11

## 2019-10-11 NOTE — TELEPHONE ENCOUNTER
Patient tolerated procedure well. States received an 80%or more decrease in pain following MBB on 9/25/19 and would like to continue on to second MBB. Scheduled 2nd MBB ON 10/16/19

## 2019-10-14 ENCOUNTER — LAB VISIT (OUTPATIENT)
Dept: LAB | Facility: HOSPITAL | Age: 59
End: 2019-10-14
Attending: INTERNAL MEDICINE
Payer: MEDICARE

## 2019-10-14 DIAGNOSIS — Z20.828 PARVOVIRUS EXPOSURE: ICD-10-CM

## 2019-10-14 DIAGNOSIS — Z94.0 KIDNEY REPLACED BY TRANSPLANT: ICD-10-CM

## 2019-10-14 LAB
ALBUMIN SERPL BCP-MCNC: 3.7 G/DL (ref 3.5–5.2)
ALBUMIN SERPL BCP-MCNC: 3.7 G/DL (ref 3.5–5.2)
ALP SERPL-CCNC: 163 U/L (ref 55–135)
ALT SERPL W/O P-5'-P-CCNC: 27 U/L (ref 10–44)
ANION GAP SERPL CALC-SCNC: 9 MMOL/L (ref 8–16)
AST SERPL-CCNC: 24 U/L (ref 10–40)
BASOPHILS # BLD AUTO: 0.03 K/UL (ref 0–0.2)
BASOPHILS NFR BLD: 0.5 % (ref 0–1.9)
BILIRUB DIRECT SERPL-MCNC: 0.2 MG/DL (ref 0.1–0.3)
BILIRUB SERPL-MCNC: 0.5 MG/DL (ref 0.1–1)
BUN SERPL-MCNC: 19 MG/DL (ref 8–23)
CALCIUM SERPL-MCNC: 9.7 MG/DL (ref 8.7–10.5)
CHLORIDE SERPL-SCNC: 106 MMOL/L (ref 95–110)
CO2 SERPL-SCNC: 27 MMOL/L (ref 23–29)
CREAT SERPL-MCNC: 1 MG/DL (ref 0.5–1.4)
DIFFERENTIAL METHOD: ABNORMAL
EOSINOPHIL # BLD AUTO: 0.3 K/UL (ref 0–0.5)
EOSINOPHIL NFR BLD: 5.3 % (ref 0–8)
ERYTHROCYTE [DISTWIDTH] IN BLOOD BY AUTOMATED COUNT: 13.7 % (ref 11.5–14.5)
EST. GFR  (AFRICAN AMERICAN): >60 ML/MIN/1.73 M^2
EST. GFR  (NON AFRICAN AMERICAN): >60 ML/MIN/1.73 M^2
GLUCOSE SERPL-MCNC: 183 MG/DL (ref 70–110)
HCT VFR BLD AUTO: 35.5 % (ref 37–48.5)
HGB BLD-MCNC: 11.8 G/DL (ref 12–16)
IMM GRANULOCYTES # BLD AUTO: 0.04 K/UL (ref 0–0.04)
IMM GRANULOCYTES NFR BLD AUTO: 0.7 % (ref 0–0.5)
LYMPHOCYTES # BLD AUTO: 0.7 K/UL (ref 1–4.8)
LYMPHOCYTES NFR BLD: 11.9 % (ref 18–48)
MAGNESIUM SERPL-MCNC: 1.5 MG/DL (ref 1.6–2.6)
MCH RBC QN AUTO: 31.6 PG (ref 27–31)
MCHC RBC AUTO-ENTMCNC: 33.2 G/DL (ref 32–36)
MCV RBC AUTO: 95 FL (ref 82–98)
MONOCYTES # BLD AUTO: 0.5 K/UL (ref 0.3–1)
MONOCYTES NFR BLD: 7.6 % (ref 4–15)
NEUTROPHILS # BLD AUTO: 4.4 K/UL (ref 1.8–7.7)
NEUTROPHILS NFR BLD: 74 % (ref 38–73)
NRBC BLD-RTO: 0 /100 WBC
PHOSPHATE SERPL-MCNC: 3.9 MG/DL (ref 2.7–4.5)
PLATELET # BLD AUTO: 181 K/UL (ref 150–350)
PMV BLD AUTO: 9.8 FL (ref 9.2–12.9)
POTASSIUM SERPL-SCNC: 4.3 MMOL/L (ref 3.5–5.1)
PROT SERPL-MCNC: 7.2 G/DL (ref 6–8.4)
RBC # BLD AUTO: 3.74 M/UL (ref 4–5.4)
SODIUM SERPL-SCNC: 142 MMOL/L (ref 136–145)
TACROLIMUS BLD-MCNC: 5.2 NG/ML (ref 5–15)
WBC # BLD AUTO: 5.9 K/UL (ref 3.9–12.7)

## 2019-10-14 PROCEDURE — 36415 COLL VENOUS BLD VENIPUNCTURE: CPT | Mod: PO

## 2019-10-14 PROCEDURE — 80197 ASSAY OF TACROLIMUS: CPT

## 2019-10-14 PROCEDURE — 80069 RENAL FUNCTION PANEL: CPT

## 2019-10-14 PROCEDURE — 84075 ASSAY ALKALINE PHOSPHATASE: CPT

## 2019-10-14 PROCEDURE — 87799 DETECT AGENT NOS DNA QUANT: CPT | Mod: 91

## 2019-10-14 PROCEDURE — 87799 DETECT AGENT NOS DNA QUANT: CPT

## 2019-10-14 PROCEDURE — 86352 CELL FUNCTION ASSAY W/STIM: CPT

## 2019-10-14 PROCEDURE — 83735 ASSAY OF MAGNESIUM: CPT

## 2019-10-14 PROCEDURE — 85025 COMPLETE CBC W/AUTO DIFF WBC: CPT

## 2019-10-15 ENCOUNTER — PATIENT MESSAGE (OUTPATIENT)
Dept: ENDOCRINOLOGY | Facility: CLINIC | Age: 59
End: 2019-10-15

## 2019-10-16 ENCOUNTER — TELEPHONE (OUTPATIENT)
Dept: PAIN MEDICINE | Facility: CLINIC | Age: 59
End: 2019-10-16

## 2019-10-16 ENCOUNTER — HOSPITAL ENCOUNTER (OUTPATIENT)
Facility: AMBULARY SURGERY CENTER | Age: 59
Discharge: HOME | End: 2019-10-16
Attending: ANESTHESIOLOGY | Admitting: ANESTHESIOLOGY
Payer: MEDICARE

## 2019-10-16 DIAGNOSIS — M47.896 OTHER SPONDYLOSIS, LUMBAR REGION: Primary | ICD-10-CM

## 2019-10-16 PROCEDURE — 99152 MOD SED SAME PHYS/QHP 5/>YRS: CPT | Mod: ,,, | Performed by: ANESTHESIOLOGY

## 2019-10-16 PROCEDURE — 64493 INJ PARAVERT F JNT L/S 1 LEV: CPT | Mod: RT,,, | Performed by: ANESTHESIOLOGY

## 2019-10-16 PROCEDURE — 64494 INJ PARAVERT F JNT L/S 2 LEV: CPT | Performed by: ANESTHESIOLOGY

## 2019-10-16 PROCEDURE — 64493 INJ PARAVERT F JNT L/S 1 LEV: CPT | Performed by: ANESTHESIOLOGY

## 2019-10-16 PROCEDURE — 64493 PR INJ DX/THER AGNT PARAVERT FACET JOINT,IMG GUIDE,LUMBAR/SAC,1ST LVL: ICD-10-PCS | Mod: RT,,, | Performed by: ANESTHESIOLOGY

## 2019-10-16 PROCEDURE — 64494 PR INJ DX/THER AGNT PARAVERT FACET JOINT,IMG GUIDE,LUMBAR/SAC, 2ND LEVEL: ICD-10-PCS | Mod: RT,,, | Performed by: ANESTHESIOLOGY

## 2019-10-16 PROCEDURE — 64494 INJ PARAVERT F JNT L/S 2 LEV: CPT | Mod: RT,,, | Performed by: ANESTHESIOLOGY

## 2019-10-16 PROCEDURE — 99152 PR MOD CONSCIOUS SEDATION, SAME PHYS, 5+ YRS, FIRST 15 MIN: ICD-10-PCS | Mod: ,,, | Performed by: ANESTHESIOLOGY

## 2019-10-16 PROCEDURE — 64495 INJ PARAVERT F JNT L/S 3 LEV: CPT | Performed by: ANESTHESIOLOGY

## 2019-10-16 RX ORDER — HYDROCODONE BITARTRATE AND ACETAMINOPHEN 10; 325 MG/1; MG/1
1 TABLET ORAL EVERY 6 HOURS PRN
Qty: 28 TABLET | Refills: 0 | Status: SHIPPED | OUTPATIENT
Start: 2019-10-16 | End: 2019-11-15

## 2019-10-16 RX ORDER — SODIUM CHLORIDE, SODIUM LACTATE, POTASSIUM CHLORIDE, CALCIUM CHLORIDE 600; 310; 30; 20 MG/100ML; MG/100ML; MG/100ML; MG/100ML
INJECTION, SOLUTION INTRAVENOUS ONCE AS NEEDED
Status: COMPLETED | OUTPATIENT
Start: 2019-10-16 | End: 2019-10-16

## 2019-10-16 RX ORDER — MIDAZOLAM HYDROCHLORIDE 2 MG/2ML
INJECTION, SOLUTION INTRAMUSCULAR; INTRAVENOUS
Status: DISCONTINUED | OUTPATIENT
Start: 2019-10-16 | End: 2019-10-16 | Stop reason: HOSPADM

## 2019-10-16 RX ORDER — BUPIVACAINE HYDROCHLORIDE 5 MG/ML
INJECTION, SOLUTION EPIDURAL; INTRACAUDAL
Status: DISCONTINUED | OUTPATIENT
Start: 2019-10-16 | End: 2019-10-16 | Stop reason: HOSPADM

## 2019-10-16 RX ADMIN — SODIUM CHLORIDE, SODIUM LACTATE, POTASSIUM CHLORIDE, CALCIUM CHLORIDE: 600; 310; 30; 20 INJECTION, SOLUTION INTRAVENOUS at 11:10

## 2019-10-16 NOTE — TELEPHONE ENCOUNTER
Left message with pt partner stating that rx sent [facility] earlier. Advised to callback if any issues.

## 2019-10-16 NOTE — INTERVAL H&P NOTE
The patient has been examined and the H&P has been reviewed:    I concur with the findings and no changes have occurred since H&P was written. This patient has been cleared for surgery in an ambulatory surgical facility    ASA 3,  Mallampatti Score 3  No history of anesthetic complications  Plan for RN IV sedation      Anesthesia/Surgery risks, benefits and alternative options discussed and understood by patient/family.          Active Hospital Problems    Diagnosis  POA    Other spondylosis, lumbar region [M47.896]  Yes      Resolved Hospital Problems   No resolved problems to display.

## 2019-10-16 NOTE — OP NOTE
PROCEDURE DATE: 10/16/2019    PROCEDURE:  Right L2,3,4,5 medial branch nerve blocks    DIAGNOSIS:  Other lumbar spondylosis    Post Op diagnosis: Same    PHYSICIAN: Yonny Ferrer MD    MEDICATIONS INJECTED: 0.5% bupivicaine, 0.5 ml at each level    SEDATION MEDICATIONS:RN IV Versed    LOCAL ANESTHETIC USED: None    ESTIMATED BLOOD LOSS:  None    COMPLICATIONS:  None    TECHNIQUE: A time out was taken to identify the patient, procedure and side of the procedure. The patient was placed in a prone position, then prepped and draped in the usual sterile fashion using ChloraPrep and sterile towels.  The levels were determined under fluoroscopic guidance and then marked.  A 25-gauge 3.5 inch needle was introduced to the anatomic location of the L3,4,5 medial branch nerves on the bilateral side. The above medication was then injected. The patient tolerated the procedure well.     The patient was monitored after the procedure. Patient was given pain diary to record pain levels at home.     If found to have greater than a 50% recovery and so will be scheduled for a radiofrequency ablation of the corresponding nerves.  Patient was given post procedure and discharge instructions to follow at home.  The patient was discharged in a stable condition.

## 2019-10-16 NOTE — TELEPHONE ENCOUNTER
----- Message from Teresita Barbour sent at 10/16/2019  1:32 PM CDT -----  Contact: Any Alvarez Partner  Type:  RX Refill Request    Who Called:  Any   Refill or New Rx:  refill  RX Name and Strength:  Norvac  How is the patient currently taking it? (ex. 1XDay):  3XDay  Is this a 30 day or 90 day RX:  30  Preferred Pharmacy with phone number:    [id number] - [place] - [address]  [address] [address]  Phone: [phone] Fax: [phone]  Local or Mail Order:  local  Ordering Provider:  Dr Nabil Stevenson Call Back Number:  [phone] (home)   Additional Information:  richard

## 2019-10-16 NOTE — DISCHARGE INSTRUCTIONS
Nerve Block  This was a test, not a treatment. Your pain may return.  Keep your pain journal Dr office will call to check your pain levels within 3 days   Perform activities that normally cause you pain during this testing period    Home care instructions  You may apply ice pack to the injection site for 2 days , NO HEAT for 2 days  You may take a shower but no soaking above level of injection in tub or pool for 2 days  Resume Aspirin, Plavix, or Coumadin the day after the procedure unless otherwise instructed.  Do not drive for 24 hr      SEEK  IMMEDIATE MEDICAL HELP FOR:  Severe increase in your usual pain or appearance of new pain  Prolonged  ( more than 8 hours)or increasing weakness or numbness in the legs or arms  Drainage, redness, active bleeding, or increased swelling at the injection site  Temperature over 100.0 degrees F.  Headache that increases when your head is upright and decreases when you lie flat  Shortness of breath, chest pain, or problems breathing      After Surgery:  Always be aware that any surgery can cause these symptoms:    Pain- After this  test, we expect your pain to decrease but  then it may return as the local anesthetic wears off. Try to NOT take your pain medicine during this testing period. Ice and rest can help with pain relief.    Bleeding- a little bleeding after a surgery is usually within normal.  If there is a lot of blood you need to notify your MD.  Emergency treatments of bleeding are cold application, elevation of the bleeding site and compression.    Infection- Infection after surgery is NOT a normal occurrence.  Signs of infection are fever, swelling, hot to touch the incision.  If this occurs notify your MD immediately.    Nausea- this can be common after a surgery especially if you have had anesthesia medicine or are taking pain medicine.  Staying on clear liquids, bland foods, gingerale, or over the counter anti nausea medicines can help.  If you vomit more than  once, notify your MD.  Anti Nausea medicines can be prescribed.  Before leaving, please make sure you have all your personal belongings such as glasses, purses, wallets, keys, cell phones, jewelry, jackets etc   Anesthesia: After Your Surgery  Youve just had surgery. During surgery, you received medication called anesthesia to keep you comfortable and pain-free. After surgery, you may experience some pain or nausea. This is normal. Here are some tips for feeling better and recovering after surgery.         Stay on schedule with your medication.   Going Home  Your doctor or nurse will show you how to take care of yourself when you go home. He or she will also answer your questions. Have an adult family member or friend drive you home. For the first 24 hours after your surgery:  · Do not drive or use heavy equipment.  · Do not make important decisions or sign documents.  · Avoid alcohol.  · Have someone stay with you, if possible. They can watch for problems and help keep you safe.  Be sure to keep all follow-up doctors appointments. And rest after your procedure for as long as your doctor tells you to.  Coping with Pain  If you have pain after surgery, pain medication will help you feel better. Take it as directed, before pain becomes severe. Also, ask your doctor or pharmacist about other ways to control pain, such as with heat, ice, and relaxation. And follow any other instructions your surgeon or nurse gives you.  Tips for Taking Pain Medication  To get the best relief possible, remember these points:  · Pain medications can upset your stomach. Taking them with a little food may help.  · Most pain relievers taken by mouth need at least 20 to 30 minutes to take effect.  · Taking medication on a schedule can help you remember to take it. Try to time your medication so that you can take it before beginning an activity, such as dressing, walking, or sitting down for dinner.  · Constipation is a common side effect of  pain medications. Drink lots of fluids. Eating fruit and vegetables can also help. Dont take laxatives unless your surgeon has prescribed them.  · Mixing alcohol and pain medication can cause dizziness and slow your breathing. It can even be fatal. Dont drink alcohol while taking pain medication.  · Pain medication can slow your reflexes. Dont drive or operate machinery while taking pain medication.  Managing Nausea  Some people have an upset stomach after surgery. This is often due to anesthesia, pain, pain medications, or the stress of surgery. The following tips will help you manage nausea and get good nutrition as you recover. If you were on a special diet before surgery, ask your doctor if you should follow it during recovery. These tips may help:  · Dont push yourself to eat. Your body will tell you what to eat and when.  · Start off with liquids and soup. They are easier to digest.  · Progress to semisolids (mashed potatoes, applesauce, and gelatin) as you feel ready.  · Slowly move to solid foods. Dont eat fatty, rich, or spicy foods at first.  · Dont force yourself to have three large meals a day. Instead, eat smaller amounts more often.  · Take pain medications with a small amount of solid food, such as crackers or toast.  Call Your Surgeon If  · You still have pain an hour after taking medication (it may not be strong enough).  · You feel too sleepy, dizzy, or groggy (medication may be too strong).  · You have side effects like nausea, vomiting, or skin changes (rash, itching, or hives).   After Surgery:  Always be aware that any surgery can cause these symptoms:    Pain- Medication can be prescribed for pain to decrease your pain but may not completely take your pain away.  Over the Counter pain medicine my be enough and you can always use Ice and rest to help ease pain.    Bleeding- a little bleeding after a surgery is usually within normal.  If there is a lot of blood you need to notify your MD.   Emergency treatments of bleeding are cold application, elevation of the bleeding site and compression.    Infection- Infection after surgery is NOT a normal occurrence.  Signs of infection are fever, swelling, hot to touch the incision.  If this occurs notify your MD immediately.    Nausea- this can be common after a surgery especially if you have had anesthesia medicine or are taking pain medicine.  Staying on clear liquids, bland foods, gingerale, or over the counter anti nausea medicines can help.  If you vomit more than once, notify your MD.  Anti Nausea medicines can be prescribed.

## 2019-10-16 NOTE — DISCHARGE SUMMARY
[facility]  Discharge Note  Short Stay    Admit Date: 10/16/2019    Discharge Date and Time: 10/16/2019    Attending Physician: Yonny Ferrer MD     Discharge Provider: Yonny Ferrer    Diagnoses:  Active Hospital Problems    Diagnosis  POA    *Other spondylosis, lumbar region [M47.896]  Yes      Resolved Hospital Problems   No resolved problems to display.       Hospital Course: Lumbar mBB  Discharged Condition: Good    Final Diagnoses:   Active Hospital Problems    Diagnosis  POA    *Other spondylosis, lumbar region [M47.896]  Yes      Resolved Hospital Problems   No resolved problems to display.       Disposition: Home or Self Care    Follow up/Patient Instructions:    Medications:  Reconciled Home Medications:      Medication List      CHANGE how you take these medications    * HYDROcodone-acetaminophen  mg per tablet  Commonly known as:  NORCO  Take 1 tablet by mouth every 4 (four) hours as needed (pain).  What changed:  Another medication with the same name was added. Make sure you understand how and when to take each.     * HYDROcodone-acetaminophen  mg per tablet  Commonly known as:  NORCO  Take 1 tablet by mouth every 6 (six) hours as needed (pain).  What changed:  You were already taking a medication with the same name, and this prescription was added. Make sure you understand how and when to take each.         * This list has 2 medication(s) that are the same as other medications prescribed for you. Read the directions carefully, and ask your doctor or other care provider to review them with you.            CONTINUE taking these medications    blood sugar diagnostic Strp  Commonly known as:  BLOOD GLUCOSE TEST  Checks her bg 4 times a day   e11.9     diclofenac sodium 1 % Gel  Commonly known as:  VOLTAREN  Apply 2 g topically 4 (four) times daily as needed. For pain     flash glucose scanning reader Misc  Commonly known as:  FREESTYLE DAKOTAH 14 DAY READER  Dispense 1 reader     flash  glucose sensor Kit  Commonly known as:  FREESTYLE DAKOTAH 14 DAY SENSOR  Uses 2 kit monthly     insulin regular hum U-500 conc 500 unit/mL (3 mL) Inpn  Commonly known as:  HumuLIN R U-500 (Conc) Kwikpen  100 units breakfast and dinner     levothyroxine 75 MCG tablet  Commonly known as:  SYNTHROID  Take 1 tablet (75 mcg total) by mouth once daily.     magnesium oxide 400 mg (241.3 mg magnesium) tablet  Commonly known as:  MAG-OX  Take 2 tablets (800 mg total) by mouth once daily.     methocarbamol 750 MG Tab  Commonly known as:  ROBAXIN-750  Take 1 tablet (750 mg total) by mouth every 8 (eight) hours as needed (muscle spasm/ pain).     multivitamin tablet  Commonly known as:  THERAGRAN  Take 1 tablet by mouth once daily.     mycophenolate 250 mg Cap  Commonly known as:  CELLCEPT  Take 2 capsules (500 mg total) by mouth 2 (two) times daily.     oxybutynin 10 MG 24 hr tablet  Commonly known as:  DITROPAN-XL     pantoprazole 40 MG tablet  Commonly known as:  PROTONIX  Take 1 tablet (40 mg total) by mouth once daily.     rosuvastatin 10 MG tablet  Commonly known as:  CRESTOR  TAKE ONE TABLET BY MOUTH ONCE DAILY     sulfamethoxazole-trimethoprim 400-80mg 400-80 mg per tablet  Commonly known as:  BACTRIM,SEPTRA  Take 1 tablet by mouth once daily.     * tacrolimus 0.5 MG Cap  Commonly known as:  PROGRAF  Take 1 capsule (0.5 mg total) by mouth every evening. Total 2mg in AM PO and 1.5mg PO in PM Z94.0     * tacrolimus 1 MG Cap  Commonly known as:  PROGRAF  Take 2 capsules (2 mg total) by mouth every morning AND 1 capsule (1 mg total) every evening. Total 2mg in AM PO and 1.5mg in PM PO. Z94.0.     traMADol 50 mg tablet  Commonly known as:  ULTRAM  Take 1 tablet (50 mg total) by mouth every 6 (six) hours as needed for Pain.     vilazodone 40 mg Tab tablet  Commonly known as:  VIIBRYD  Take 1 tablet (40 mg total) by mouth once daily.         * This list has 2 medication(s) that are the same as other medications prescribed for  you. Read the directions carefully, and ask your doctor or other care provider to review them with you.              Discharge Procedure Orders   Call MD for:  temperature >100.4     Call MD for:  persistent nausea and vomiting or diarrhea     Call MD for:  severe uncontrolled pain     Call MD for:  redness, tenderness, or signs of infection (pain, swelling, redness, odor or green/yellow discharge around incision site)     Call MD for:  difficulty breathing or increased cough     Call MD for:  severe persistent headache        Follow up with MD in 2-3 weeks    Discharge Procedure Orders (must include Diet, Follow-up, Activity):   Discharge Procedure Orders (must include Diet, Follow-up, Activity)   Call MD for:  temperature >100.4     Call MD for:  persistent nausea and vomiting or diarrhea     Call MD for:  severe uncontrolled pain     Call MD for:  redness, tenderness, or signs of infection (pain, swelling, redness, odor or green/yellow discharge around incision site)     Call MD for:  difficulty breathing or increased cough     Call MD for:  severe persistent headache

## 2019-10-17 ENCOUNTER — PATIENT MESSAGE (OUTPATIENT)
Dept: PAIN MEDICINE | Facility: CLINIC | Age: 59
End: 2019-10-17

## 2019-10-17 LAB — IMMUNKNOW (STIMULATED): 173 NG/ML (ref 226–524)

## 2019-10-18 ENCOUNTER — PATIENT MESSAGE (OUTPATIENT)
Dept: PAIN MEDICINE | Facility: CLINIC | Age: 59
End: 2019-10-18

## 2019-10-18 VITALS
BODY MASS INDEX: 33.32 KG/M2 | HEART RATE: 95 BPM | RESPIRATION RATE: 18 BRPM | HEIGHT: 67 IN | WEIGHT: 212.31 LBS | SYSTOLIC BLOOD PRESSURE: 203 MMHG | OXYGEN SATURATION: 98 % | DIASTOLIC BLOOD PRESSURE: 106 MMHG | TEMPERATURE: 99 F

## 2019-10-21 DIAGNOSIS — M47.896 OTHER SPONDYLOSIS, LUMBAR REGION: Primary | ICD-10-CM

## 2019-10-21 LAB — B19V DNA # SPEC NAA+PROBE: 279 COPIES/ML

## 2019-10-25 DIAGNOSIS — Z94.0 KIDNEY REPLACED BY TRANSPLANT: Primary | ICD-10-CM

## 2019-10-28 ENCOUNTER — RESEARCH ENCOUNTER (OUTPATIENT)
Dept: RESEARCH | Facility: HOSPITAL | Age: 59
End: 2019-10-28
Payer: MEDICARE

## 2019-10-28 ENCOUNTER — OFFICE VISIT (OUTPATIENT)
Dept: TRANSPLANT | Facility: CLINIC | Age: 59
End: 2019-10-28
Payer: MEDICARE

## 2019-10-28 ENCOUNTER — LAB VISIT (OUTPATIENT)
Dept: LAB | Facility: HOSPITAL | Age: 59
End: 2019-10-28
Payer: MEDICARE

## 2019-10-28 VITALS
WEIGHT: 218.06 LBS | SYSTOLIC BLOOD PRESSURE: 159 MMHG | TEMPERATURE: 98 F | HEIGHT: 67 IN | BODY MASS INDEX: 34.22 KG/M2 | OXYGEN SATURATION: 97 % | RESPIRATION RATE: 19 BRPM | DIASTOLIC BLOOD PRESSURE: 88 MMHG | HEART RATE: 88 BPM

## 2019-10-28 VITALS
WEIGHT: 220 LBS | HEART RATE: 73 BPM | DIASTOLIC BLOOD PRESSURE: 75 MMHG | TEMPERATURE: 98 F | SYSTOLIC BLOOD PRESSURE: 133 MMHG | BODY MASS INDEX: 34.46 KG/M2 | RESPIRATION RATE: 18 BRPM

## 2019-10-28 DIAGNOSIS — Z79.899 IMMUNOSUPPRESSIVE MANAGEMENT ENCOUNTER FOLLOWING KIDNEY TRANSPLANT: ICD-10-CM

## 2019-10-28 DIAGNOSIS — Z94.0 STATUS POST LIVING-DONOR KIDNEY TRANSPLANTATION: ICD-10-CM

## 2019-10-28 DIAGNOSIS — Z94.0 LIVING-DONOR KIDNEY TRANSPLANT RECIPIENT: ICD-10-CM

## 2019-10-28 DIAGNOSIS — Z00.6 RESEARCH STUDY PATIENT: ICD-10-CM

## 2019-10-28 DIAGNOSIS — Z94.0 IMMUNOSUPPRESSIVE MANAGEMENT ENCOUNTER FOLLOWING KIDNEY TRANSPLANT: ICD-10-CM

## 2019-10-28 DIAGNOSIS — Z20.828 PARVOVIRUS EXPOSURE: ICD-10-CM

## 2019-10-28 DIAGNOSIS — N18.30 CHRONIC KIDNEY DISEASE (CKD), STAGE III (MODERATE): Primary | Chronic | ICD-10-CM

## 2019-10-28 DIAGNOSIS — Z94.0 KIDNEY REPLACED BY TRANSPLANT: ICD-10-CM

## 2019-10-28 DIAGNOSIS — D61.9 APLASTIC ANEMIA: ICD-10-CM

## 2019-10-28 DIAGNOSIS — Z91.89 AT RISK FOR OPPORTUNISTIC INFECTIONS: ICD-10-CM

## 2019-10-28 LAB
ALBUMIN SERPL BCP-MCNC: 3.9 G/DL (ref 3.5–5.2)
ANION GAP SERPL CALC-SCNC: 7 MMOL/L (ref 8–16)
BASOPHILS # BLD AUTO: 0.03 K/UL (ref 0–0.2)
BASOPHILS NFR BLD: 0.5 % (ref 0–1.9)
BUN SERPL-MCNC: 18 MG/DL (ref 8–23)
CALCIUM SERPL-MCNC: 9.5 MG/DL (ref 8.7–10.5)
CHLORIDE SERPL-SCNC: 107 MMOL/L (ref 95–110)
CO2 SERPL-SCNC: 28 MMOL/L (ref 23–29)
CREAT SERPL-MCNC: 1.1 MG/DL (ref 0.5–1.4)
DIFFERENTIAL METHOD: ABNORMAL
DRUG STUDY SPECIMEN TYPE: NORMAL
DRUG STUDY TEST NAME: NORMAL
DRUG STUDY TEST RESULT: NORMAL
EOSINOPHIL # BLD AUTO: 0.4 K/UL (ref 0–0.5)
EOSINOPHIL NFR BLD: 6.2 % (ref 0–8)
ERYTHROCYTE [DISTWIDTH] IN BLOOD BY AUTOMATED COUNT: 13.3 % (ref 11.5–14.5)
EST. GFR  (AFRICAN AMERICAN): >60 ML/MIN/1.73 M^2
EST. GFR  (NON AFRICAN AMERICAN): 54.3 ML/MIN/1.73 M^2
GLUCOSE SERPL-MCNC: 180 MG/DL (ref 70–110)
HCT VFR BLD AUTO: 38.1 % (ref 37–48.5)
HGB BLD-MCNC: 11.7 G/DL (ref 12–16)
IMM GRANULOCYTES # BLD AUTO: 0.03 K/UL (ref 0–0.04)
IMM GRANULOCYTES NFR BLD AUTO: 0.5 % (ref 0–0.5)
LYMPHOCYTES # BLD AUTO: 0.7 K/UL (ref 1–4.8)
LYMPHOCYTES NFR BLD: 10.6 % (ref 18–48)
MAGNESIUM SERPL-MCNC: 1.6 MG/DL (ref 1.6–2.6)
MCH RBC QN AUTO: 30.1 PG (ref 27–31)
MCHC RBC AUTO-ENTMCNC: 30.7 G/DL (ref 32–36)
MCV RBC AUTO: 98 FL (ref 82–98)
MONOCYTES # BLD AUTO: 0.5 K/UL (ref 0.3–1)
MONOCYTES NFR BLD: 7.8 % (ref 4–15)
NEUTROPHILS # BLD AUTO: 4.6 K/UL (ref 1.8–7.7)
NEUTROPHILS NFR BLD: 74.4 % (ref 38–73)
NRBC BLD-RTO: 0 /100 WBC
PHOSPHATE SERPL-MCNC: 3.4 MG/DL (ref 2.7–4.5)
PLATELET # BLD AUTO: 216 K/UL (ref 150–350)
PMV BLD AUTO: 9.4 FL (ref 9.2–12.9)
POTASSIUM SERPL-SCNC: 4.5 MMOL/L (ref 3.5–5.1)
RBC # BLD AUTO: 3.89 M/UL (ref 4–5.4)
SODIUM SERPL-SCNC: 142 MMOL/L (ref 136–145)
TACROLIMUS BLD-MCNC: 7.6 NG/ML (ref 5–15)
WBC # BLD AUTO: 6.14 K/UL (ref 3.9–12.7)

## 2019-10-28 PROCEDURE — 99215 OFFICE O/P EST HI 40 MIN: CPT | Mod: S$GLB,,, | Performed by: INTERNAL MEDICINE

## 2019-10-28 PROCEDURE — 36415 COLL VENOUS BLD VENIPUNCTURE: CPT

## 2019-10-28 PROCEDURE — 99499 RISK ADDL DX/OHS AUDIT: ICD-10-PCS | Mod: S$GLB,,, | Performed by: INTERNAL MEDICINE

## 2019-10-28 PROCEDURE — 87799 DETECT AGENT NOS DNA QUANT: CPT

## 2019-10-28 PROCEDURE — 85025 COMPLETE CBC W/AUTO DIFF WBC: CPT

## 2019-10-28 PROCEDURE — 99999 PR PBB SHADOW E&M-EST. PATIENT-LVL III: CPT | Mod: PBBFAC,,, | Performed by: INTERNAL MEDICINE

## 2019-10-28 PROCEDURE — 83735 ASSAY OF MAGNESIUM: CPT

## 2019-10-28 PROCEDURE — 99000 SPECIMEN HANDLING OFFICE-LAB: CPT

## 2019-10-28 PROCEDURE — 86352 CELL FUNCTION ASSAY W/STIM: CPT

## 2019-10-28 PROCEDURE — 3077F SYST BP >= 140 MM HG: CPT | Mod: CPTII,S$GLB,, | Performed by: INTERNAL MEDICINE

## 2019-10-28 PROCEDURE — 3079F PR MOST RECENT DIASTOLIC BLOOD PRESSURE 80-89 MM HG: ICD-10-PCS | Mod: CPTII,S$GLB,, | Performed by: INTERNAL MEDICINE

## 2019-10-28 PROCEDURE — 3008F BODY MASS INDEX DOCD: CPT | Mod: CPTII,S$GLB,, | Performed by: INTERNAL MEDICINE

## 2019-10-28 PROCEDURE — 3008F PR BODY MASS INDEX (BMI) DOCUMENTED: ICD-10-PCS | Mod: CPTII,S$GLB,, | Performed by: INTERNAL MEDICINE

## 2019-10-28 PROCEDURE — 99499 UNLISTED E&M SERVICE: CPT | Mod: S$GLB,,, | Performed by: INTERNAL MEDICINE

## 2019-10-28 PROCEDURE — 99999 PR PBB SHADOW E&M-EST. PATIENT-LVL III: ICD-10-PCS | Mod: PBBFAC,,, | Performed by: INTERNAL MEDICINE

## 2019-10-28 PROCEDURE — 99215 PR OFFICE/OUTPT VISIT, EST, LEVL V, 40-54 MIN: ICD-10-PCS | Mod: S$GLB,,, | Performed by: INTERNAL MEDICINE

## 2019-10-28 PROCEDURE — 3077F PR MOST RECENT SYSTOLIC BLOOD PRESSURE >= 140 MM HG: ICD-10-PCS | Mod: CPTII,S$GLB,, | Performed by: INTERNAL MEDICINE

## 2019-10-28 PROCEDURE — 3079F DIAST BP 80-89 MM HG: CPT | Mod: CPTII,S$GLB,, | Performed by: INTERNAL MEDICINE

## 2019-10-28 PROCEDURE — 80197 ASSAY OF TACROLIMUS: CPT

## 2019-10-28 PROCEDURE — 80069 RENAL FUNCTION PANEL: CPT

## 2019-10-28 RX ORDER — FAMOTIDINE 20 MG/1
20 TABLET, FILM COATED ORAL 2 TIMES DAILY
Qty: 60 TABLET | Refills: 11 | Status: SHIPPED | OUTPATIENT
Start: 2019-10-28 | End: 2020-01-07

## 2019-10-28 RX ORDER — BACLOFEN 20 MG
500 TABLET ORAL DAILY
Refills: 0 | COMMUNITY
Start: 2019-10-28

## 2019-10-28 NOTE — PROGRESS NOTES
[facility] CMV Prevention (Kidney Transplant)  Protocol: [id number]  IRB# 2017.512  PI: Nicholas GUAN)  Site: [id number]     Subject #[id number]     Visit 16 / Week 40     Date of Visit: 28Oct2019     Patient arrived at clinic to complete her V16/WK40 visit.  Protocol labs were drawn at 0844 and were processed/shipped as per protocol.  , Evette Yoder, conducted the visit in its entirety with Sharri Sands present. Patient agrees to continue his participation in the study and all questions answered. Epic was reviewed prior to patient's visit.  It was noted that the patient had two block-nerve-medial branch-lumbar procedures done (09Sep2019 and 03Oct2019).  Concomitant medication change includes hydrocodone-acetaminophen (NORCO).  Patient confirmed these findings.  The concomitant medication change and procedures were added to the patient's source and EDC.  The patient reported no hospitalizations and no health utilization resources.  Patient also stated she had not had any recent or current cold like symptoms, fever, or bowel movement changes.      Vitals:    10/28/19 0855   BP: 133/75   Patient Position: Sitting   Pulse: 73   Resp: 18   Temp: 98.3 °F (36.8 °C)   Weight: 99.8 kg (220 lb 0.3 oz)       Current Outpatient Medications:     blood sugar diagnostic (BLOOD GLUCOSE TEST) Strp, Checks her bg 4 times a day   e11.9, Disp: 400 strip, Rfl: 4    famotidine (PEPCID) 20 MG tablet, Take 1 tablet (20 mg total) by mouth 2 (two) times daily., Disp: 60 tablet, Rfl: 11    HYDROcodone-acetaminophen (NORCO)  mg per tablet, Take 1 tablet by mouth every 6 (six) hours as needed (pain)., Disp: 28 tablet, Rfl: 0    insulin regular hum U-500 conc (HUMULIN R U-500, CONC, KWIKPEN) 500 unit/mL (3 mL) InPn, 100 units breakfast and dinner, Disp: 3 Syringe, Rfl: 12    levothyroxine (SYNTHROID) 75 MCG tablet, Take 1 tablet (75 mcg total) by mouth once daily., Disp: 30 tablet, Rfl: 4    magnesium oxide 500 mg  Tab, Take 500 mg by mouth once daily., Disp: , Rfl: 0    methocarbamol (ROBAXIN-750) 750 MG Tab, Take 1 tablet (750 mg total) by mouth every 8 (eight) hours as needed (muscle spasm/ pain)., Disp: 40 tablet, Rfl: 0    multivitamin (THERAGRAN) tablet, Take 1 tablet by mouth once daily., Disp: , Rfl:     mycophenolate (CELLCEPT) 250 mg Cap, Take 2 capsules (500 mg total) by mouth 2 (two) times daily., Disp: 120 capsule, Rfl: 11    oxybutynin (DITROPAN-XL) 10 MG 24 hr tablet, , Disp: , Rfl:     rosuvastatin (CRESTOR) 10 MG tablet, TAKE ONE TABLET BY MOUTH ONCE DAILY, Disp: 90 tablet, Rfl: 0    sulfamethoxazole-trimethoprim 400-80mg (BACTRIM,SEPTRA) 400-80 mg per tablet, Take 1 tablet by mouth once daily., Disp: 30 tablet, Rfl: 6    tacrolimus (PROGRAF) 0.5 MG Cap, Take 1 capsule (0.5 mg total) by mouth every evening. Total 2mg in AM PO and 1.5mg PO in PM Z94.0, Disp: 30 capsule, Rfl: 11    tacrolimus (PROGRAF) 1 MG Cap, Take 2 capsules (2 mg total) by mouth every morning AND 1 capsule (1 mg total) every evening. Total 2mg in AM PO and 1.5mg in PM PO. Z94.0., Disp: 90 capsule, Rfl: 11    vilazodone (VIIBRYD) 40 mg Tab tablet, Take 1 tablet (40 mg total) by mouth once daily., Disp: 30 tablet, Rfl: 4    Current Facility-Administered Medications:     sodium chloride 0.9% bolus 2,000 mL, 2,000 mL, Intravenous, Once, Maureen Cabral MD    CMV Disease Assessment, Renal Transplant Outcomes, and Health Outcomes Assessment were completed.  Study participant was reminded to call the site/ for any questions.  Next appointment is 21Nov2019.

## 2019-10-28 NOTE — PATIENT INSTRUCTIONS
From Dr. Watson:  It is my privilege to participate in your post-transplant care!  Please be sure to let us know if you have any questions or concerns about your health care - we cannot help you if we do not know.  Don't forget we are on call 24/7 for any emergencies.   Best Wishes,  Dr. Shirley Cabral

## 2019-10-28 NOTE — PROGRESS NOTES
"   Kidney Post-Transplant Assessment    Referring Physician: Dalton Heaton  Current Nephrologist: Dalton Heaton    ORGAN: LEFT KIDNEY  Donor Type: living  PHS Increased Risk: no  Cold Ischemia: 37 mins  Induction Medications: campath - alemtuzumab (anti-cd52)    Subjective:     CC:  Reassessment of renal allograft function and management of chronic immunosuppression.    HPI:  Ms. Newell is a 59 y.o. year old White female who received a living kidney transplant on 1/14/19. Her most recent creatinine is 1.1. She takes mycophenolate mofetil (held d/t recurrent ESBL UTIs Jan-Feb 2019) and tacrolimus for maintenance immunosuppression. Her post transplant course has been uncomplicated to date.    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 She briefly required HD 4/13/18 until ~Sept 2018.  she was predialysis at the time of transplant.   -LURD KT (friend) 01/14/2019,  Induced with Campath.     -Recurrent K pneumoniae ESBL 2019 UTI 1/30, 2/11, and  2/25  -Admit for unsteadiness and dizziness with work up WNL, MRI pending (r/s d/t hosp admit)  -admitted 05/07/2019 with fever infectious workup negative for bacteria.  Refractory anemia noted, and it was found a parvovirus B19 from 4/15/19 causing aplastic anemia was positive  -colonoscopy May 2019 for diarrhea unrevealing.    PCP PROPHYLAXIS: Until 1/14/19; Atovaquone; Bactrim stopped 01/24/2019 D/T hiK  CMV PROPHYLAXIS: CMV  Mismatch -study participant--study participation terminated May 2019 when pancytopenia noted, and it became clear she would require longer-term withholding of antiviral medication than was allowed in the research study. Rx until 7/14/19  FUNGAL PROPHYLAXIS: None    UPDATE  Andra is now 9 mos post KT. She feels "great." She reports significant back pain, and is scheduled for a procedure tomorrow.  : She denies any kidney-related complaints, such as pain over allograft, flank " "pain, dysuria, frequency, or other LUTS.  Denies CP, SOB. Reports no reflux on protonix, which is a new med post txp.    Review of Systems   Constitutional: Negative for fever.   Eyes: Negative for visual disturbance.   Respiratory: Negative for shortness of breath.    Cardiovascular: Negative for chest pain and leg swelling.   Gastrointestinal: Negative.    Genitourinary: Negative for difficulty urinating.   Musculoskeletal: Positive for back pain.   Skin: Negative for rash.   Allergic/Immunologic: Positive for immunocompromised state.   Neurological: Negative for tremors.     Objective: LMP 02/28/2012     BP (!) 159/88   Pulse 88   Temp 98.4 °F (36.9 °C)   Resp 19   Ht 5' 7" (1.702 m)   Wt 98.9 kg (218 lb 0.6 oz)   LMP 02/28/2012   SpO2 97%   BMI 34.15 kg/m²      Physical Exam   Constitutional: No distress.   Cardiovascular: Normal rate and regular rhythm.   Pulmonary/Chest: Effort normal and breath sounds normal. No respiratory distress.   Abdominal: Soft. She exhibits no mass. There is no tenderness.   Musculoskeletal: She exhibits no edema.   Neurological: Coordination abnormal.   Skin: Skin is warm and dry.   Psychiatric: She has a normal mood and affect.     Labs:  Lab Results   Component Value Date    WBC 5.90 10/14/2019    HGB 11.8 (L) 10/14/2019    HCT 35.5 (L) 10/14/2019     10/14/2019    K 4.3 10/14/2019     10/14/2019    CO2 27 10/14/2019    BUN 19 10/14/2019    CREATININE 1.0 10/14/2019    EGFRNONAA >60.0 10/14/2019    CALCIUM 9.7 10/14/2019    PHOS 3.9 10/14/2019    MG 1.5 (L) 10/14/2019    ALBUMIN 3.7 10/14/2019    ALBUMIN 3.7 10/14/2019    AST 24 10/14/2019    ALT 27 10/14/2019    UTPCR 0.13 10/14/2019    .0 (H) 02/15/2019    TACROLIMUS 5.2 10/14/2019     Lab Results   Component Value Date    EXTANC 1,240 04/15/2019    EXTWBC 2.0 (L) 04/15/2019    EXTSEGS 62.0 04/15/2019    EXTPLATELETS 141 04/15/2019    EXTHEMOGLOBI 8.7 (L) 04/15/2019    EXTHEMATOCRI 27.6 (L) 04/15/2019 "    EXTCREATININ 0.84 04/15/2019    EXTSODIUM 140 04/15/2019    EXTPOTASSIUM 4.3 04/15/2019    EXTBUN 13 04/15/2019    EXTCO2 23.0 04/15/2019    EXTCALCIUM 8.6 04/15/2019    EXTGLUCOSE 128 (H) 04/15/2019    EXTALBUMIN 3.4 04/15/2019    EXTAST 37 04/15/2019    EXTALT 35 (H) 04/15/2019    EXTBILITOTAL 0.8 04/15/2019     Labs were reviewed with the patient    Assessment:     1. CKD stage III (moderate)    2. Aplastic anemia with parvovirus B19 infection 4/2019    3. Status post living-donor kidney transplantation    4. Living-donor kidney transplant recipient    5. Immunosuppressive management encounter following kidney transplant    6. At risk for opportunistic infections        Plan:   CKD IIIa-II s/p kidney transplantation,with recent creatinine 1.0! [better than baseline]. Continue to monitor renal function and electrolytes, HTN, secondary hyperparathyroidism and other issues related to underlying ESRD.      Aplastic anemia from parvovirus B 19 infection.  Already given IVIG with evidence of improvement.  Continue close monitoring.  Parvovirus remains detectable, but counts have essentially normalized. After discussion with ID, she was restarted on low dose IS with good results.    Immunosuppression: Continue tacrolimus-based immunosuppression.   - Goal tacro dropped to 5-7 after immuknow cylex came back very low and parvovirus diagnosis made, both indicative of over immunosuppression. Presently acceptable.  - Back on CellCept & tolerating well.  - Monitoring cylex, which is running 150-200  - Must watch very closely for drug toxicity and med-related side effects as well as balance risk of infections vs. Rejection in this very complex case.      Hypomagnesemia-   Recent Labs   Lab 09/16/19  0852 09/30/19  0844 10/14/19  0900   Magnesium 1.7 1.7 1.5 L   - Has been taking mag oxide 750 mg daily-- lower to 500 mg (1 tab) daily. Will tolerate mild asymptomatic low level d/t pill burden, DDI, and adverse SE      OI  prophylaxis:     -Bactrim d/c'd earlier d/t hyperkalemia, changed to atovaquone- now back on bactrim with adequate K level.     -Valcyte study medication for CMV prophylaxis stopped d/t neutropenia.    Hypertension - BP at home before back pain started was 130s/70s. It has been higher recently. Advised to keep monitoring and report to her nephrologist if it stays >130/80.     H/o Reflux- asymptomatic. Change protonix to Pepcid. Importance of some stomach acid reviewed. Advised to let PC Pknow if starts having GI issues again.    h/o mood disorder:  Cont pre-txp meds      Follow-up:   Clinic: return to transplant clinic weekly for the first month after transplant; every 2 weeks during months 2-3; then at 6-, 9-, 12-, 18-, 24-, and 36- months post-transplant to reassess for complications from immunosuppression toxicity and monitor for rejection.  Annually thereafter.    Labs: since patient remains at high risk for rejection and drug-related complications that warrant close monitoring, labs will be ordered as follows: continue twice weekly CBC, renal panel, and drug level for first month; then same labs once weekly through 3rd month post-transplant.  Urine for UA and protein/creatinine ratio monthly.  Urine BK - PCR at 1-, 3-, 6-, 9-, 12-, 18-, 24-, and 36- months post-transplant.  Hepatic panel at 1-, 2-, 3-, 6-, 9-, 12-, 18-, 24-, and 36- months post-transplant.    Maureen Cabral MD        ADDENDUM:  Spoke with Dr. Kessler, who believes it is acceptable to resume CellCept with close monitoring of her white count.  Will start at low dose of 250 mg, 1 cap twice daily.  Continue serial monitoring of parvovirus PCR to ensure counts do not worsen.

## 2019-10-28 NOTE — LETTER
October 28, 2019        Dalton Heaton  [address] [facility]  [place] [address]  Phone: [phone]  Fax: [phone]             [facility]  [address]  [facility] [address]  Phone: [phone]   Patient: Andra Newell   MR Number: [id number]   YOB: 1960   Date of Visit: 10/28/2019       Dear Dr. Dalton Heaton    Thank you for referring Andra Newell to me for evaluation. Attached you will find relevant portions of my assessment and plan of care.    If you have questions, please do not hesitate to call me. I look forward to following Andra Newell along with you.    Sincerely,    Maureen Cabral MD    Enclosure    If you would like to receive this communication electronically, please contact [email or web address] or [phone] to request [employer] Link access.    [employer] Link is a tool which provides read-only access to select patient information with whom you have a relationship. Its easy to use and provides real time access to review your patients record including encounter summaries, notes, results, and demographic information.    If you feel you have received this communication in error or would no longer like to receive these types of communications, please e-mail [email or web address]

## 2019-10-29 ENCOUNTER — HOSPITAL ENCOUNTER (OUTPATIENT)
Facility: AMBULARY SURGERY CENTER | Age: 59
Discharge: HOME | End: 2019-10-29
Attending: ANESTHESIOLOGY | Admitting: ANESTHESIOLOGY
Payer: MEDICARE

## 2019-10-29 DIAGNOSIS — M47.896 OTHER SPONDYLOSIS, LUMBAR REGION: Primary | ICD-10-CM

## 2019-10-29 LAB — POCT GLUCOSE: 125 MG/DL (ref 70–110)

## 2019-10-29 PROCEDURE — 99152 PR MOD CONSCIOUS SEDATION, SAME PHYS, 5+ YRS, FIRST 15 MIN: ICD-10-PCS | Mod: ,,, | Performed by: ANESTHESIOLOGY

## 2019-10-29 PROCEDURE — 99152 MOD SED SAME PHYS/QHP 5/>YRS: CPT | Mod: ,,, | Performed by: ANESTHESIOLOGY

## 2019-10-29 PROCEDURE — 64636 DESTROY L/S FACET JNT ADDL: CPT | Mod: RT | Performed by: ANESTHESIOLOGY

## 2019-10-29 PROCEDURE — 64635 PR DESTROY LUMB/SAC FACET JNT: ICD-10-PCS | Mod: RT,,, | Performed by: ANESTHESIOLOGY

## 2019-10-29 PROCEDURE — 64636 DESTROY L/S FACET JNT ADDL: CPT | Mod: RT,,, | Performed by: ANESTHESIOLOGY

## 2019-10-29 PROCEDURE — 64636 PR DESTROY L/S FACET JNT ADDL: ICD-10-PCS | Mod: RT,,, | Performed by: ANESTHESIOLOGY

## 2019-10-29 PROCEDURE — 64635 DESTROY LUMB/SAC FACET JNT: CPT | Mod: RT,,, | Performed by: ANESTHESIOLOGY

## 2019-10-29 PROCEDURE — 64635 DESTROY LUMB/SAC FACET JNT: CPT | Mod: RT | Performed by: ANESTHESIOLOGY

## 2019-10-29 RX ORDER — LIDOCAINE HYDROCHLORIDE 20 MG/ML
INJECTION, SOLUTION EPIDURAL; INFILTRATION; INTRACAUDAL; PERINEURAL
Status: DISCONTINUED | OUTPATIENT
Start: 2019-10-29 | End: 2019-10-29 | Stop reason: HOSPADM

## 2019-10-29 RX ORDER — MIDAZOLAM HYDROCHLORIDE 2 MG/2ML
INJECTION, SOLUTION INTRAMUSCULAR; INTRAVENOUS
Status: DISCONTINUED | OUTPATIENT
Start: 2019-10-29 | End: 2019-10-29 | Stop reason: HOSPADM

## 2019-10-29 RX ORDER — BUPIVACAINE HYDROCHLORIDE 2.5 MG/ML
INJECTION, SOLUTION EPIDURAL; INFILTRATION; INTRACAUDAL
Status: DISCONTINUED | OUTPATIENT
Start: 2019-10-29 | End: 2019-10-29 | Stop reason: HOSPADM

## 2019-10-29 RX ORDER — SODIUM CHLORIDE, SODIUM LACTATE, POTASSIUM CHLORIDE, CALCIUM CHLORIDE 600; 310; 30; 20 MG/100ML; MG/100ML; MG/100ML; MG/100ML
INJECTION, SOLUTION INTRAVENOUS ONCE AS NEEDED
Status: COMPLETED | OUTPATIENT
Start: 2019-10-29 | End: 2019-10-29

## 2019-10-29 RX ORDER — METHYLPREDNISOLONE ACETATE 80 MG/ML
INJECTION, SUSPENSION INTRA-ARTICULAR; INTRALESIONAL; INTRAMUSCULAR; SOFT TISSUE
Status: DISCONTINUED | OUTPATIENT
Start: 2019-10-29 | End: 2019-10-29 | Stop reason: HOSPADM

## 2019-10-29 RX ORDER — LIDOCAINE HYDROCHLORIDE 10 MG/ML
INJECTION, SOLUTION EPIDURAL; INFILTRATION; INTRACAUDAL; PERINEURAL
Status: DISCONTINUED | OUTPATIENT
Start: 2019-10-29 | End: 2019-10-29 | Stop reason: HOSPADM

## 2019-10-29 RX ORDER — FENTANYL CITRATE 50 UG/ML
INJECTION, SOLUTION INTRAMUSCULAR; INTRAVENOUS
Status: DISCONTINUED | OUTPATIENT
Start: 2019-10-29 | End: 2019-10-29 | Stop reason: HOSPADM

## 2019-10-29 RX ADMIN — SODIUM CHLORIDE, SODIUM LACTATE, POTASSIUM CHLORIDE, CALCIUM CHLORIDE: 600; 310; 30; 20 INJECTION, SOLUTION INTRAVENOUS at 10:10

## 2019-10-29 NOTE — OP NOTE
PROCEDURE DATE: 10/29/2019    PROCEDURE:  Radiofrequency ablation of the L2,3,4,5 medial branch nerves on the right-side utilizing fluoroscopy    DIAGNOSIS:  Other lumbar spondylosis  Post op Diagnosis: Same    PHYSICIAN: Yonny Ferrer MD    MEDICATIONS INJECTED:  From a mixture of 6ml of 0.25% bupivicaine and 80mg of methylprednisone,  1ml of this solution was injected at each level.    LOCAL ANESTHETIC USED: Lidocaine 1%, 2 ml given at each site.    SEDATION MEDICATIONS: RN IV sedation    ESTIMATED BLOOD LOSS:  None    COMPLICATIONS:  NOne    TECHNIQUE:  A time out was taken to identify patient and procedure side prior to starting the procedure. Laying in a prone position, the patient was prepped and draped in the usual sterile fashion using ChloraPrep and sterile towels.  The levels were determined under fluoroscopic guidance and then marked.  Local anesthetic was given by raising a wheal at the skin over each site and then infiltrated approximately 2cm deeper.  A 20-gauge  100 mm RF needle was introduced to the anatomic location of the right L2,3,4,5 medial branch nerves. Motor stimulation up to 2 Volts at each level confirmed no motor nerve involvement.  Impedance was less than 800 ohms at each level.  1ml of 2% lidocaine was instilled prior to lesioning.  Ablation was performed per level utilizing radiofrequency generator 80°C for 60 seconds.  Needles were then rotated 90° and a second lesion was performed.  The above noted medication was then injected slowly. The patient tolerated the procedure well.     The patient was monitored after the procedure.  Patient was given post procedure and discharge instructions to follow at home.  The patient was discharged in a stable condition

## 2019-10-29 NOTE — DISCHARGE SUMMARY
[facility]  Discharge Note  Short Stay    Admit Date: 10/29/2019    Discharge Date and Time: 10/29/2019    Attending Physician: Yonny Ferrer MD     Discharge Provider: Yonny Ferrer    Diagnoses:  Active Hospital Problems    Diagnosis  POA    *Other spondylosis, lumbar region [M47.896]  Yes      Resolved Hospital Problems   No resolved problems to display.       Hospital Course: Lumbar mB RFA  Discharged Condition: Good    Final Diagnoses:   Active Hospital Problems    Diagnosis  POA    *Other spondylosis, lumbar region [M47.896]  Yes      Resolved Hospital Problems   No resolved problems to display.       Disposition: Home or Self Care    Follow up/Patient Instructions:    Medications:  Reconciled Home Medications:      Medication List      CONTINUE taking these medications    blood sugar diagnostic Strp  Commonly known as:  BLOOD GLUCOSE TEST  Checks her bg 4 times a day   e11.9     famotidine 20 MG tablet  Commonly known as:  PEPCID  Take 1 tablet (20 mg total) by mouth 2 (two) times daily.     HYDROcodone-acetaminophen  mg per tablet  Commonly known as:  NORCO  Take 1 tablet by mouth every 6 (six) hours as needed (pain).     insulin regular hum U-500 conc 500 unit/mL (3 mL) Inpn  Commonly known as:  HumuLIN R U-500 (Conc) Kwikpen  100 units breakfast and dinner     levothyroxine 75 MCG tablet  Commonly known as:  SYNTHROID  Take 1 tablet (75 mcg total) by mouth once daily.     magnesium oxide 500 mg Tab  Take 500 mg by mouth once daily.     methocarbamol 750 MG Tab  Commonly known as:  ROBAXIN-750  Take 1 tablet (750 mg total) by mouth every 8 (eight) hours as needed (muscle spasm/ pain).     multivitamin tablet  Commonly known as:  THERAGRAN  Take 1 tablet by mouth once daily.     mycophenolate 250 mg Cap  Commonly known as:  CELLCEPT  Take 2 capsules (500 mg total) by mouth 2 (two) times daily.     rosuvastatin 10 MG tablet  Commonly known as:  CRESTOR  TAKE ONE TABLET BY MOUTH ONCE DAILY      sulfamethoxazole-trimethoprim 400-80mg 400-80 mg per tablet  Commonly known as:  BACTRIM,SEPTRA  Take 1 tablet by mouth once daily.     * tacrolimus 0.5 MG Cap  Commonly known as:  PROGRAF  Take 1 capsule (0.5 mg total) by mouth every evening. Total 2mg in AM PO and 1.5mg PO in PM Z94.0     * tacrolimus 1 MG Cap  Commonly known as:  PROGRAF  Take 2 capsules (2 mg total) by mouth every morning AND 1 capsule (1 mg total) every evening. Total 2mg in AM PO and 1.5mg in PM PO. Z94.0.     vilazodone 40 mg Tab tablet  Commonly known as:  VIIBRYD  Take 1 tablet (40 mg total) by mouth once daily.         * This list has 2 medication(s) that are the same as other medications prescribed for you. Read the directions carefully, and ask your doctor or other care provider to review them with you.              Discharge Procedure Orders   Call MD for:  temperature >100.4     Call MD for:  persistent nausea and vomiting or diarrhea     Call MD for:  severe uncontrolled pain     Call MD for:  redness, tenderness, or signs of infection (pain, swelling, redness, odor or green/yellow discharge around incision site)     Call MD for:  difficulty breathing or increased cough     Call MD for:  severe persistent headache        Follow up with MD in 2-3 weeks    Discharge Procedure Orders (must include Diet, Follow-up, Activity):   Discharge Procedure Orders (must include Diet, Follow-up, Activity)   Call MD for:  temperature >100.4     Call MD for:  persistent nausea and vomiting or diarrhea     Call MD for:  severe uncontrolled pain     Call MD for:  redness, tenderness, or signs of infection (pain, swelling, redness, odor or green/yellow discharge around incision site)     Call MD for:  difficulty breathing or increased cough     Call MD for:  severe persistent headache

## 2019-10-29 NOTE — PLAN OF CARE
Patient is being driven home by her friend Any. She was able to walk to her truck. She declined going to the bathroom. She was sent home with a reusable ice pack.

## 2019-10-29 NOTE — DISCHARGE INSTRUCTIONS
After Surgery:  Always be aware that any surgery can cause these symptoms:    Pain- Medication can be prescribed for pain to decrease your pain but may not completely take your pain away.  Over the Counter pain medicine my be enough and you can always use Ice and rest to help ease pain.    Bleeding- a little bleeding after a surgery is usually within normal.  If there is a lot of blood you need to notify your MD.  Emergency treatments of bleeding are cold application, elevation of the bleeding site and compression.    Infection- Infection after surgery is NOT a normal occurrence.  Signs of infection are fever, swelling, hot to touch the incision.  If this occurs notify your MD immediately.    Nausea- this can be common after a surgery especially if you have had anesthesia medicine or are taking pain medicine.  Staying on clear liquids, bland foods, gingerale, or over the counter anti nausea medicines can help.  If you vomit more than once, notify your MD.  Anti Nausea medicines can be prescribed.  Recovery at Home    After this procedure you may have increased pain for three days and lingering pain for up to 6 weeks.   Most patients feel better after 2 weeks.    Use your pain medications as nessecary but the goal of this treartment is to wean you off your pain medication.  You may have weakness after the procedure due to a marcaine injection    .Before leaving, please make sure you have all your personal belongings such as glasses, purses, wallets, keys, cell phones, jewelry, jackets etc     Recovery After Procedural Sedation (Adult)  You have been given medicine by vein to make you sleep during your surgery. This may have included both a pain medicine and sleeping medicine. Most of the effects have worn off. But you may still have some drowsiness for the next 6 to 8 hours.  Home care  Follow these guidelines when you get home:  · For the next 8 hours, you should be watched by a responsible adult. This person should  make sure your condition is not getting worse.  · Don't drink any alcohol for the next 24 hours.  · Don't drive, operate dangerous machinery, or make important business or personal decisions during the next 24 hours.  Note: Your healthcare provider may tell you not to take any medicine by mouth for pain or sleep in the next 4 hours. These medicines may react with the medicines you were given in the hospital. This could cause a much stronger response than usual.  Follow-up care  Follow up with your healthcare provider if you are not alert and back to your usual level of activity within 12 hours.  When to seek medical advice  Call your healthcare provider right away if any of these occur:  · Drowsiness gets worse  · Weakness or dizziness gets worse  · Repeated vomiting  · You can't be awakened   Date Last Reviewed: 10/18/2016  © 1921-0802 [employer]. [address] [place] [id number]. All rights reserved. This information is not intended as a substitute for professional medical care. Always follow your healthcare professional's instructions.      RADIOFREQUENCY/Pain injection instructions:     This procedure may take a couple weeks to relieve pain  You may get some pain relief from the local anesthetic initally.    No driving for 24 hrs.   Activity as tolerated- gradually increase activities.  Dont lift over 10 lbs for 24 hrs   No heat at injection sites x 2 days. No heating pads, hot tubs, saunas, or swimming in any body of water or pool for 2 days.  Use ice pack for mild swelling and for comfort , apply for 20 minutes, remove for 20 minute intervals. No direct contact of ice itself  to skin.  May shower today. No tub baths for two days.      Resume Aspirin, Plavix, or Coumadin the day after the procedure unless otherwise instructed.   If diabetic,monitor your glucose carefully as steroids can increase your glucose level    Seek immediate medical help for:   Severe increase in your usual pain  or appearance of new pain.  Prolonged (mor than 8 hours) or increasing weakness or numbness in the legs or arms. Numbing medicine was injected and can affect the messages to and from the brain and legs or arms.  .    Fever above 101 ,Drainage,redness,active bleeding, or increased swelling at the injection site.  Headache, shortness of breath, chest pain, or breathing problems.    After Surgery:  Always be aware that any surgery can cause these symptoms:    Pain- Medication can be prescribed for pain to decrease your pain but may not completely take your pain away. Over the Counter pain medicine my be enough and you can always use Ice and rest to help ease pain.    Bleeding- a little bleeding after a surgery is usually within normal.  If there is a lot of blood you need to notify your MD.  Emergency treatments of bleeding are cold application, elevation of the bleeding site and compression.    Infection- Infection after surgery is NOT a normal occurrence.  Signs of infection are fever, swelling, hot to touch the incision.  If this occurs notify your MD immediately.    Nausea- this can be common after a surgery especially if you have had anesthesia medicine or are taking pain medicine.  Steroids have a side effect of nausea sometimes. Staying on clear liquids, bland foods, gingerale, or over the counter anti nausea medicines can help.  If you vomit more than once, notify your MD.  Anti Nausea medicines can be prescribed.    Use ice every 10 minutes every hour for up to 2 days  Do not use a heating pad or take tub baths or swim for 2 days.  You may shower today  Gradually increase your activities.  Dont lift anything over 10 lbs for the first 24 hours  Dont drive the day of the procedure.  Wait until tomorrow to resume any blood thinners (aspirin, Plavix, Coumadin) but you may resume all your other medications today.    Call right away if you notice any of the following symptoms:  Severe pain or headache  Fever or  chills  Redness or swelling around the injection site   Difficulty breathing  Vomiting or Increasing numbness or weakness in arms or legs

## 2019-10-30 LAB
IMMUNKNOW (STIMULATED): 237 NG/ML (ref 226–524)
IMMUNKNOW (STIMULATED): 237 NG/ML (ref 226–524)

## 2019-10-31 VITALS
HEART RATE: 68 BPM | DIASTOLIC BLOOD PRESSURE: 74 MMHG | BODY MASS INDEX: 33.32 KG/M2 | HEIGHT: 67 IN | OXYGEN SATURATION: 95 % | TEMPERATURE: 99 F | SYSTOLIC BLOOD PRESSURE: 157 MMHG | WEIGHT: 212.31 LBS | RESPIRATION RATE: 19 BRPM

## 2019-10-31 LAB — B19V DNA # SPEC NAA+PROBE: 378 COPIES/ML

## 2019-11-06 LAB — ALLOSURE: NORMAL

## 2019-11-07 NOTE — ED NOTES
"Pt states, "fever and very weak. Nauseated and had a headache." historian states, "the fever just kept going higher so I took her to the [facility] in [place] and they transferred us here." no acute distress noted. Stable condition. Family member at bedside.    "
Assisted patient to bathroom. Patient reports upper abdominal cramping. Hooked back to continuous cardiac, BP, and pulse oximetry monitoring. Call bell within reach. Will continue to monitor.  
Assumed care of patient after receiving report from Reema Calderon RN. I acknowledge care for this patient. The patient is awake, alert, and cooperating with a calm affect. RR spontaneous, even, and unlabored, with regular effort and rate. Patient is in NAD, and resting calmly on stretcher with blood pressure cuff, pulse ox, and cardiac monitor attached. Bed locked in low position with side rails up x2 for safety, and call bell within reach. Patient is aware of POC, and has no complaints or concerns at this time. Will continue to monitor.  
Bed: 07  Expected date:   Expected time:   Means of arrival:   Comments:  milagros  
CBG resulted at 189 mg/dL  
Dietary tray ordered for pt for breakfast and lunch.  
Dietary tray ordered.  
Food tray ordered.  
How Severe Is Your Skin Lesion?: mild
Lunch tray delivered to patient.    
MD Brett at bedside.  
Patient assisted to the bathroom. Hooked back up to continuous pulse oximetry, BP, and cardiac monitoring. Call bell within reach. Lights turned off per patient preference. Side rails raised x2. Will continue to monitor.  
Patient ate lunch before BG was obtained. Patient encouraged to wait for RN to check sugar/administer insulin if needed before eating meal.   
Patient hooked back up to continuous BP, pulse, and cardiac monitoring. Call bell within reach. Will continue to monitor.  
Patient resting in bed with sister sitting at the bedside. Continuous cardiac, BP, and pulse oximetry in place. Call bell within reach. Will continue to monitor.  
Patient resting in bed. Continuous cardiac, BP, and pulse oximetry monitoring in place. Call bell within reach. Will continue to monitor.  
Patient sitting up in bed. Alert and oriented. Continuous pulse ox and cardiac monitoring in place. Call bell within reach. Will continue to monitor.  
Patient transport to ultrasound.  
Per pharmacy: ED pharmacist will be in at 1400 and can verify medications for pt.  
Pharmacy contacted regarding overdue meds and messages sent for med verification since this morning.  
Pt ate breakfast tray prior to RN being able to administer insulin. Pt encouraged and educated on importance of letting RN check CBG and administer insulin prior to eating.  
Pt meds taken to inpatient pharmacy by RN for verification.  
Pt's family member contacted ED. With pt's permission, pt's partner, Any, notified that patient remains in ED in stable condition. Pt partner notified that I cannot give out certain info over the phone and verbalizes understanding. Pt family member given patient relations phone number by unit secretary, Kasia, per request for the length of pt's stay in the ED.  
The patient is awake, alert and acting age appropriately. Family at bedside.    No apparent distress noted. Airway is open and patent.  Respirations with normal effort and rate noted. Explanation of care provided to family and patient. No needs at this time. Will continue to monitor.   
The patient is awake, alert and acting age appropriately. Family at bedside.    No apparent distress noted. Airway is open and patent.  Respirations with normal effort and rate noted. Explanation of care provided to family and patient. No needs at this time. Will continue to monitor.   
The patient is resting.   No apparent distress noted. Airway is open and patent.  Respirations with normal effort and rate noted. Explanation of care provided to family and patient. No needs at this time. Will continue to monitor.   
Has Your Skin Lesion Been Treated?: not been treated
Is This A New Presentation, Or A Follow-Up?: Skin Lesions

## 2019-11-11 ENCOUNTER — TELEPHONE (OUTPATIENT)
Dept: PAIN MEDICINE | Facility: CLINIC | Age: 59
End: 2019-11-11

## 2019-11-11 ENCOUNTER — OFFICE VISIT (OUTPATIENT)
Dept: PAIN MEDICINE | Facility: CLINIC | Age: 59
End: 2019-11-11
Payer: MEDICARE

## 2019-11-11 VITALS
BODY MASS INDEX: 34.21 KG/M2 | HEART RATE: 75 BPM | WEIGHT: 218 LBS | SYSTOLIC BLOOD PRESSURE: 143 MMHG | DIASTOLIC BLOOD PRESSURE: 78 MMHG | HEIGHT: 67 IN

## 2019-11-11 DIAGNOSIS — M51.36 DDD (DEGENERATIVE DISC DISEASE), LUMBAR: ICD-10-CM

## 2019-11-11 DIAGNOSIS — M96.1 POSTLAMINECTOMY SYNDROME OF LUMBAR REGION: ICD-10-CM

## 2019-11-11 DIAGNOSIS — M47.896 OTHER SPONDYLOSIS, LUMBAR REGION: Primary | ICD-10-CM

## 2019-11-11 PROCEDURE — 99999 PR PBB SHADOW E&M-EST. PATIENT-LVL III: ICD-10-PCS | Mod: PBBFAC,,, | Performed by: ANESTHESIOLOGY

## 2019-11-11 PROCEDURE — 99213 OFFICE O/P EST LOW 20 MIN: CPT | Mod: S$GLB,,, | Performed by: ANESTHESIOLOGY

## 2019-11-11 PROCEDURE — 3078F PR MOST RECENT DIASTOLIC BLOOD PRESSURE < 80 MM HG: ICD-10-PCS | Mod: CPTII,S$GLB,, | Performed by: ANESTHESIOLOGY

## 2019-11-11 PROCEDURE — 3008F PR BODY MASS INDEX (BMI) DOCUMENTED: ICD-10-PCS | Mod: CPTII,S$GLB,, | Performed by: ANESTHESIOLOGY

## 2019-11-11 PROCEDURE — 3008F BODY MASS INDEX DOCD: CPT | Mod: CPTII,S$GLB,, | Performed by: ANESTHESIOLOGY

## 2019-11-11 PROCEDURE — 99213 PR OFFICE/OUTPT VISIT, EST, LEVL III, 20-29 MIN: ICD-10-PCS | Mod: S$GLB,,, | Performed by: ANESTHESIOLOGY

## 2019-11-11 PROCEDURE — 3078F DIAST BP <80 MM HG: CPT | Mod: CPTII,S$GLB,, | Performed by: ANESTHESIOLOGY

## 2019-11-11 PROCEDURE — 3077F PR MOST RECENT SYSTOLIC BLOOD PRESSURE >= 140 MM HG: ICD-10-PCS | Mod: CPTII,S$GLB,, | Performed by: ANESTHESIOLOGY

## 2019-11-11 PROCEDURE — 99999 PR PBB SHADOW E&M-EST. PATIENT-LVL III: CPT | Mod: PBBFAC,,, | Performed by: ANESTHESIOLOGY

## 2019-11-11 PROCEDURE — 3077F SYST BP >= 140 MM HG: CPT | Mod: CPTII,S$GLB,, | Performed by: ANESTHESIOLOGY

## 2019-11-12 NOTE — PROGRESS NOTES
This note was completed with dictation software and grammatical errors may exist.    Referring Physician: No ref. provider found    PCP: Primary Doctor No      CC:  Right-sided lower back pain    Interval history: Patient returns to our clinic.  She is s/p right L2-5 MB RFA on 10/29/19 and reports about 50% relief so far.  Sharp, stabbing pains have improved.  She still has some dull aching pain in her right back and buttocks. Pain worsens with standing, walking, extension, getting up.  She has tried physical therapy and massage therapy with minimal benefit.   She denies any worsening weakness.  No bowel bladder changes.    Prior HPI:   Andra Newell is a 59 y.o. female referred to us for right-sided lower back pain. Pain has worsened over the past 2 months.  No recent traumatic incident.  She does have history of kidney transplant performed in January 2019.  She presents to us with constant aching, throbbing pain over her mid to lower back.  Pain is predominantly on the right side.  Pain does not radiate down her lower extremities. Pain worsens with standing, walking, extension, getting up.  She has tried physical therapy and massage therapy with minimal benefit.  She has not had any interventional procedures.  She recently had updated imaging of her lumbar spine.  She denies any worsening weakness.  No bowel bladder changes.    ROS:  CONSTITUTIONAL: No fevers, chills, night sweats, wt. loss, appetite changes  SKIN: no rashes or itching  ENT: No headaches, head trauma, vision changes, or eye pain  LYMPH NODES: None noticed   CV: No chest pain, palpitations.   RESP: No shortness of breath, dyspnea on exertion, cough, wheezing, or hemoptysis  GI: No nausea, emesis, diarrhea, constipation, melena, hematochezia, pain.    : No dysuria, hematuria, urgency, or frequency   HEME: No easy bruising, bleeding problems  PSYCHIATRIC: No depression, anxiety, psychosis, hallucinations.  NEURO: No seizures, memory loss,  dizziness or difficulty sleeping  MSK:  Positive HPI      Past Medical History:   Diagnosis Date    Anemia     Anemia in chronic kidney disease, on chronic dialysis 7/12/2017    Aplastic anemia with parvovirus B19 infection 5/27/2019    Arthritis     Asthma     allergic airway    CKD stage III (moderate) 2/18/2019    Colon polyp     Depression     Diabetes mellitus     Diverticulosis     Eosinophilia     ESRD (end stage renal disease)     stage V, due for living donor 9/2018    ESRD on dialysis     GERD (gastroesophageal reflux disease)     Gout     Gout     Hyperlipidemia     Hypertension     Hypertriglyceridemia without hypercholesterolemia 6/9/2019    Low back pain     Low HDL (under 40) 6/9/2019    MRSA carrier     Neutropenia, unspecified 5/8/2019    Obesity     Renal disorder     Rotator cuff syndrome--left     Thyroid disease     Ulnar neuropathy of right upper extremity     Uncontrolled type 2 diabetes mellitus with hyperglycemia      Past Surgical History:   Procedure Laterality Date    ACHILLES TENDON SURGERY Left May 2015    BACK SURGERY      CHOLECYSTECTOMY      COLONOSCOPY N/A 9/6/2017    Procedure: COLONOSCOPY;  Surgeon: Marisol Bryson MD;  Location: [facility];  Service: Endoscopy;  Laterality: N/A; REPEAT IN 3 YEARS FOR SURVEILLANCE    COLONOSCOPY N/A 5/9/2019    Procedure: COLONOSCOPY;  Surgeon: Fady Ewing MD;  Location: [facility] ([address] [place] [address]);  Service: Endoscopy;  Laterality: N/A;    DIALYSIS FISTULA CREATION  12/2017    ESOPHAGOGASTRODUODENOSCOPY N/A 5/9/2019    Procedure: EGD (ESOPHAGOGASTRODUODENOSCOPY);  Surgeon: Fady Ewing MD;  Location: [facility] ([address] [place] [address]);  Service: Endoscopy;  Laterality: N/A;    HEMORRHOID SURGERY      INJECTION OF ANESTHETIC AGENT AROUND MEDIAL BRANCH NERVES INNERVATING LUMBAR FACET JOINT Right 9/25/2019    Procedure: Block-nerve-medial branch-lumbar;  Surgeon: Yonny Ferrer MD;  Location: [facility];  Service: Pain Management;   Laterality: Right;  L2, 3, 4,5     INJECTION OF ANESTHETIC AGENT AROUND MEDIAL BRANCH NERVES INNERVATING LUMBAR FACET JOINT Right 10/16/2019    Procedure: Block-nerve-medial branch-lumbar;  Surgeon: Yonny Ferrer MD;  [facility];  Service: Pain Management;  Laterality: Right;  L2, 3, 4,5     JOINT REPLACEMENT      left    KIDNEY TRANSPLANT N/A 1/14/2019    Procedure: TRANSPLANT, KIDNEY;  Surgeon: Roland Salazar MD;  [facility];  Service: Transplant;  Laterality: N/A;    KNEE SURGERY      RADIOFREQUENCY ABLATION OF LUMBAR MEDIAL BRANCH NERVE AT SINGLE LEVEL Right 10/29/2019    Procedure: Radiofrequency Ablation, Nerve, Spinal, Lumbar, Medial Branch, 1 Level;  Surgeon: Yonny Ferrer MD;  [facility]: [facility];  Service: Pain Management;  Laterality: Right;  L2, 3, 4, 5  burned at 80 degrees C X 60 seconds each site X 2    SHOULDER ARTHROSCOPY      SHOULDER SURGERY      UPPER GASTROINTESTINAL ENDOSCOPY  ~2013    Dr. Weineror     Family History   Problem Relation Age of Onset    Diabetes Mother     Alzheimer's disease Mother     Thyroid disease Mother     Hyperlipidemia Mother     Heart disease Father     Stroke Father     Diabetes Sister     Lupus Sister     Ovarian cancer Sister     Heart disease Brother     No Known Problems Son     Diabetes Maternal Grandmother     Hypertension Maternal Grandmother     No Known Problems Paternal Grandmother     No Known Problems Paternal Grandfather     COPD Maternal Aunt     Diabetes Maternal Aunt     Heart disease Maternal Aunt     Hypertension Maternal Aunt     No Known Problems Maternal Uncle     No Known Problems Paternal Aunt     No Known Problems Paternal Uncle     Colon cancer Neg Hx     Colon polyps Neg Hx     Crohn's disease Neg Hx     Ulcerative colitis Neg Hx     Celiac disease Neg Hx      Social History     Socioeconomic History    Marital status: Significant Other     Spouse name: Not on file    Number of children: 2     "Years of education: Not on file    Highest education level: Not on file   Occupational History    Occupation: retired     Comment:    Social Needs    Financial resource strain: Not on file    Food insecurity:     Worry: Not on file     Inability: Not on file    Transportation needs:     Medical: Not on file     Non-medical: Not on file   Tobacco Use    Smoking status: Never Smoker    Smokeless tobacco: Never Used   Substance and Sexual Activity    Alcohol use: No    Drug use: No    Sexual activity: Never   Lifestyle    Physical activity:     Days per week: Not on file     Minutes per session: Not on file    Stress: Not on file   Relationships    Social connections:     Talks on phone: Not on file     Gets together: Not on file     Attends Mu-ism service: Not on file     Active member of club or organization: Not on file     Attends meetings of clubs or organizations: Not on file     Relationship status: Not on file   Other Topics Concern    Not on file   Social History Narrative    Not on file         Medications/Allergies: See med card    Vitals:    11/11/19 1504   BP: (!) 143/78   Pulse: 75   Weight: 98.9 kg (218 lb)   Height: 5' 7" (1.702 m)   PainSc:   6   PainLoc: Back         Physical exam:    GENERAL: A and O x3, the patient appears well groomed and is in no acute distress.  Skin: No rashes or obvious lesions  HEENT: normocephalic, atraumatic  CARDIOVASCULAR:  Palpable peripheral pulses  LUNGS: easy work of breathing  ABDOMEN: soft, nontender   UPPER EXTREMITIES: Normal alignment, normal range of motion, no atrophy, no skin changes,  hair growth and nail growth normal and equal bilaterally. No swelling, no tenderness.    LOWER EXTREMITIES:  Normal alignment, normal range of motion, no atrophy, no skin changes,  hair growth and nail growth normal and equal bilaterally. No swelling, no tenderness.  LUMBAR SPINE  Lumbar spine: ROM is limited with flexion extension and " oblique extension with moderate increased pain.    Yogi's test causes no increased pain on either side.    Supine straight leg raise is negative bilaterally.    Internal and external rotation of the hip causes no increased pain on either side.  Myofascial exam:  Moderate tenderness to palpation across right lumbar paraspinous muscles.      MENTAL STATUS: normal orientation, speech, language, and fund of knowledge for social situation.  Emotional state appropriate.    CRANIAL NERVES:  II:  PERRL bilaterally,   III,IV,VI: EOMI.    V:  Facial sensation equal bilaterally  VII:  Facial motor function normal.  VIII:  Hearing equal to finger rub bilaterally  IX/X: Gag normal, palate symmetric  XI:  Shoulder shrug equal, head turn equal  XII:  Tongue midline without fasciculations      MOTOR: Tone and bulk: normal bilateral upper and lower Strength: normal   Delt Bi Tri WE WF     R 5 5 5 5 5 5   L 5 5 5 5 5 5     IP ADD ABD Quad TA Gas HAM  R 5 5 5 5 5 5 5  L 5 5 5 5 5 5 5    SENSATION: Light touch and pinprick intact bilaterally  REFLEXES: normal, symmetric, nonbrisk.  Toes down, no clonus. No hoffmans.  GAIT: normal rise, base, steps, and arm swing.        Imaging:  MRI L-spine 4/2019  L1-L2, there is diffuse disc bulge along with facet hypertrophy and ligamentum flavum hypertrophy.  There is superimposed central disc protrusion.  There is moderate narrowing of the spinal canal.  The neural foramina are unremarkable.    L2-L3, there is diffuse disc bulge along with facet hypertrophy and ligamentum flavum hypertrophy.  The spinal canal is within normal limits.  There is mild bilateral neural foraminal narrowing.    L3-L4, there is a left-sided postlaminectomy changes at this level.  There is diffuse disc bulge along with facet hypertrophy and ligamentum flavum hypertrophy.  There is small amount of soft tissue along the central aspect of the spinal canal adjacent to the disc.  The spinal canal is within normal  limits.  There is mild bilateral neural foraminal narrowing.    L4-5, there is a left-sided post laminectomy changes at this level.  There is diffuse disc bulge along with facet hypertrophy and ligamentum flavum hypertrophy.  There is small amount of soft tissue along the central aspect of the spinal canal adjacent to the disc.  The spinal canal is within normal limits.  There is mild bilateral neural foraminal narrowing.    L5-S1, there is a left-sided post laminectomy changes at this level.  There is diffuse disc bulge along with facet hypertrophy and ligamentum flavum hypertrophy.  The spinal canal is within normal limits.  There is mild bilateral neural foraminal narrowing.    There are postoperative changes involving the paraspinous soft tissues.  There are simple appearing bilateral renal cysts.  Both kidneys appear atrophic.  The remainder of the retroperitoneal structures are within normal limits.    Assessment:  Patient referred for right-sided lower back pain  1. Other spondylosis, lumbar region    2. DDD (degenerative disc disease), lumbar    3. Postlaminectomy syndrome of lumbar region          Plan:  1. I have stressed the importance of physical activity and exercise to improve overall health  2. Reviewed pertinent imaging and records with patient  3. Continue to monitor progress from RFA procedure.   She may still get further benefit.  If pain persist, may consider Caudal IVETH  4. Follow up as needed

## 2019-11-14 DIAGNOSIS — Z00.6 RESEARCH STUDY PATIENT: ICD-10-CM

## 2019-11-14 DIAGNOSIS — Z94.0 KIDNEY REPLACED BY TRANSPLANT: Primary | ICD-10-CM

## 2019-11-15 ENCOUNTER — PATIENT MESSAGE (OUTPATIENT)
Dept: ENDOCRINOLOGY | Facility: CLINIC | Age: 59
End: 2019-11-15

## 2019-11-18 ENCOUNTER — PATIENT OUTREACH (OUTPATIENT)
Dept: ADMINISTRATIVE | Facility: HOSPITAL | Age: 59
End: 2019-11-18

## 2019-11-18 DIAGNOSIS — E03.9 ACQUIRED HYPOTHYROIDISM: ICD-10-CM

## 2019-11-18 RX ORDER — LEVOTHYROXINE SODIUM 75 UG/1
TABLET ORAL
Qty: 30 TABLET | Refills: 3 | Status: SHIPPED | OUTPATIENT
Start: 2019-11-18 | End: 2019-11-19 | Stop reason: SDUPTHER

## 2019-11-19 ENCOUNTER — OFFICE VISIT (OUTPATIENT)
Dept: FAMILY MEDICINE | Facility: CLINIC | Age: 59
End: 2019-11-19
Payer: MEDICARE

## 2019-11-19 ENCOUNTER — LAB VISIT (OUTPATIENT)
Dept: LAB | Facility: HOSPITAL | Age: 59
End: 2019-11-19
Attending: INTERNAL MEDICINE
Payer: MEDICARE

## 2019-11-19 VITALS
SYSTOLIC BLOOD PRESSURE: 138 MMHG | WEIGHT: 222 LBS | HEART RATE: 78 BPM | TEMPERATURE: 98 F | DIASTOLIC BLOOD PRESSURE: 68 MMHG | BODY MASS INDEX: 34.84 KG/M2 | HEIGHT: 67 IN

## 2019-11-19 DIAGNOSIS — F32.A ANXIETY AND DEPRESSION: ICD-10-CM

## 2019-11-19 DIAGNOSIS — Z94.0 KIDNEY REPLACED BY TRANSPLANT: ICD-10-CM

## 2019-11-19 DIAGNOSIS — J45.20 MILD INTERMITTENT ASTHMA WITHOUT COMPLICATION: ICD-10-CM

## 2019-11-19 DIAGNOSIS — N25.81 SECONDARY HYPERPARATHYROIDISM OF RENAL ORIGIN: ICD-10-CM

## 2019-11-19 DIAGNOSIS — F41.9 ANXIETY AND DEPRESSION: ICD-10-CM

## 2019-11-19 DIAGNOSIS — Z94.0 STATUS POST LIVING-DONOR KIDNEY TRANSPLANTATION: ICD-10-CM

## 2019-11-19 DIAGNOSIS — Z12.31 SCREENING MAMMOGRAM, ENCOUNTER FOR: ICD-10-CM

## 2019-11-19 DIAGNOSIS — E03.9 ACQUIRED HYPOTHYROIDISM: ICD-10-CM

## 2019-11-19 DIAGNOSIS — M15.9 PRIMARY OSTEOARTHRITIS INVOLVING MULTIPLE JOINTS: ICD-10-CM

## 2019-11-19 DIAGNOSIS — I12.9 HYPERTENSION ASSOCIATED WITH STAGE 3 CHRONIC KIDNEY DISEASE DUE TO TYPE 2 DIABETES MELLITUS: ICD-10-CM

## 2019-11-19 DIAGNOSIS — E78.2 MIXED DIABETIC HYPERLIPIDEMIA ASSOCIATED WITH TYPE 2 DIABETES MELLITUS: Primary | ICD-10-CM

## 2019-11-19 DIAGNOSIS — E11.69 MIXED DIABETIC HYPERLIPIDEMIA ASSOCIATED WITH TYPE 2 DIABETES MELLITUS: Primary | ICD-10-CM

## 2019-11-19 DIAGNOSIS — E55.9 VITAMIN D DEFICIENCY: ICD-10-CM

## 2019-11-19 DIAGNOSIS — E11.22 HYPERTENSION ASSOCIATED WITH STAGE 3 CHRONIC KIDNEY DISEASE DUE TO TYPE 2 DIABETES MELLITUS: ICD-10-CM

## 2019-11-19 DIAGNOSIS — E66.9 OBESITY (BMI 30-39.9): ICD-10-CM

## 2019-11-19 DIAGNOSIS — G89.4 CHRONIC PAIN SYNDROME: ICD-10-CM

## 2019-11-19 DIAGNOSIS — Z86.010 HISTORY OF COLON POLYPS: ICD-10-CM

## 2019-11-19 DIAGNOSIS — N18.30 HYPERTENSION ASSOCIATED WITH STAGE 3 CHRONIC KIDNEY DISEASE DUE TO TYPE 2 DIABETES MELLITUS: ICD-10-CM

## 2019-11-19 DIAGNOSIS — K21.9 GASTROESOPHAGEAL REFLUX DISEASE WITHOUT ESOPHAGITIS: ICD-10-CM

## 2019-11-19 DIAGNOSIS — D63.8 ANEMIA OF CHRONIC DISEASE: ICD-10-CM

## 2019-11-19 DIAGNOSIS — N39.0 FREQUENT UTI: ICD-10-CM

## 2019-11-19 DIAGNOSIS — Z20.828 PARVOVIRUS EXPOSURE: ICD-10-CM

## 2019-11-19 PROBLEM — N12 INTERSTITIAL NEPHRITIS: Status: RESOLVED | Noted: 2017-03-27 | Resolved: 2019-11-19

## 2019-11-19 PROBLEM — R30.0 DYSURIA: Status: RESOLVED | Noted: 2018-04-02 | Resolved: 2019-11-19

## 2019-11-19 PROBLEM — E83.52 HYPERCALCEMIA: Status: RESOLVED | Noted: 2018-04-02 | Resolved: 2019-11-19

## 2019-11-19 PROBLEM — R80.9 PROTEINURIA: Chronic | Status: RESOLVED | Noted: 2017-07-12 | Resolved: 2019-11-19

## 2019-11-19 PROBLEM — R19.7 DIARRHEA: Status: RESOLVED | Noted: 2019-04-04 | Resolved: 2019-11-19

## 2019-11-19 PROBLEM — Z86.0100 HISTORY OF COLON POLYPS: Status: ACTIVE | Noted: 2019-11-19

## 2019-11-19 PROBLEM — M15.0 PRIMARY OSTEOARTHRITIS INVOLVING MULTIPLE JOINTS: Status: ACTIVE | Noted: 2019-11-19

## 2019-11-19 PROBLEM — E83.39 HYPOPHOSPHATEMIA: Status: RESOLVED | Noted: 2019-01-17 | Resolved: 2019-11-19

## 2019-11-19 LAB
ALBUMIN SERPL BCP-MCNC: 3.9 G/DL (ref 3.5–5.2)
ANION GAP SERPL CALC-SCNC: 8 MMOL/L (ref 8–16)
BASOPHILS # BLD AUTO: 0.03 K/UL (ref 0–0.2)
BASOPHILS NFR BLD: 0.5 % (ref 0–1.9)
BUN SERPL-MCNC: 18 MG/DL (ref 8–23)
CALCIUM SERPL-MCNC: 10.2 MG/DL (ref 8.7–10.5)
CHLORIDE SERPL-SCNC: 107 MMOL/L (ref 95–110)
CO2 SERPL-SCNC: 29 MMOL/L (ref 23–29)
CREAT SERPL-MCNC: 1.1 MG/DL (ref 0.5–1.4)
DIFFERENTIAL METHOD: ABNORMAL
EOSINOPHIL # BLD AUTO: 0.3 K/UL (ref 0–0.5)
EOSINOPHIL NFR BLD: 5.5 % (ref 0–8)
ERYTHROCYTE [DISTWIDTH] IN BLOOD BY AUTOMATED COUNT: 13.2 % (ref 11.5–14.5)
EST. GFR  (AFRICAN AMERICAN): >60 ML/MIN/1.73 M^2
EST. GFR  (NON AFRICAN AMERICAN): 54.3 ML/MIN/1.73 M^2
GLUCOSE SERPL-MCNC: 162 MG/DL (ref 70–110)
HCT VFR BLD AUTO: 39.5 % (ref 37–48.5)
HGB BLD-MCNC: 12 G/DL (ref 12–16)
IMM GRANULOCYTES # BLD AUTO: 0.02 K/UL (ref 0–0.04)
IMM GRANULOCYTES NFR BLD AUTO: 0.3 % (ref 0–0.5)
LYMPHOCYTES # BLD AUTO: 0.6 K/UL (ref 1–4.8)
LYMPHOCYTES NFR BLD: 9.1 % (ref 18–48)
MAGNESIUM SERPL-MCNC: 1.6 MG/DL (ref 1.6–2.6)
MCH RBC QN AUTO: 30.8 PG (ref 27–31)
MCHC RBC AUTO-ENTMCNC: 30.4 G/DL (ref 32–36)
MCV RBC AUTO: 101 FL (ref 82–98)
MONOCYTES # BLD AUTO: 0.5 K/UL (ref 0.3–1)
MONOCYTES NFR BLD: 8.1 % (ref 4–15)
NEUTROPHILS # BLD AUTO: 4.7 K/UL (ref 1.8–7.7)
NEUTROPHILS NFR BLD: 76.5 % (ref 38–73)
NRBC BLD-RTO: 0 /100 WBC
PHOSPHATE SERPL-MCNC: 4.2 MG/DL (ref 2.7–4.5)
PLATELET # BLD AUTO: 190 K/UL (ref 150–350)
PMV BLD AUTO: 9.7 FL (ref 9.2–12.9)
POTASSIUM SERPL-SCNC: 4.8 MMOL/L (ref 3.5–5.1)
RBC # BLD AUTO: 3.9 M/UL (ref 4–5.4)
SODIUM SERPL-SCNC: 144 MMOL/L (ref 136–145)
WBC # BLD AUTO: 6.18 K/UL (ref 3.9–12.7)

## 2019-11-19 PROCEDURE — 3078F PR MOST RECENT DIASTOLIC BLOOD PRESSURE < 80 MM HG: ICD-10-PCS | Mod: CPTII,S$GLB,, | Performed by: FAMILY MEDICINE

## 2019-11-19 PROCEDURE — 99999 PR PBB SHADOW E&M-EST. PATIENT-LVL III: ICD-10-PCS | Mod: PBBFAC,,, | Performed by: FAMILY MEDICINE

## 2019-11-19 PROCEDURE — 99203 PR OFFICE/OUTPT VISIT, NEW, LEVL III, 30-44 MIN: ICD-10-PCS | Mod: S$GLB,,, | Performed by: FAMILY MEDICINE

## 2019-11-19 PROCEDURE — 87799 DETECT AGENT NOS DNA QUANT: CPT

## 2019-11-19 PROCEDURE — 3008F PR BODY MASS INDEX (BMI) DOCUMENTED: ICD-10-PCS | Mod: CPTII,S$GLB,, | Performed by: FAMILY MEDICINE

## 2019-11-19 PROCEDURE — 3008F BODY MASS INDEX DOCD: CPT | Mod: CPTII,S$GLB,, | Performed by: FAMILY MEDICINE

## 2019-11-19 PROCEDURE — 83735 ASSAY OF MAGNESIUM: CPT

## 2019-11-19 PROCEDURE — 3075F SYST BP GE 130 - 139MM HG: CPT | Mod: CPTII,S$GLB,, | Performed by: FAMILY MEDICINE

## 2019-11-19 PROCEDURE — 3075F PR MOST RECENT SYSTOLIC BLOOD PRESS GE 130-139MM HG: ICD-10-PCS | Mod: CPTII,S$GLB,, | Performed by: FAMILY MEDICINE

## 2019-11-19 PROCEDURE — 99999 PR PBB SHADOW E&M-EST. PATIENT-LVL III: CPT | Mod: PBBFAC,,, | Performed by: FAMILY MEDICINE

## 2019-11-19 PROCEDURE — 80197 ASSAY OF TACROLIMUS: CPT

## 2019-11-19 PROCEDURE — 99203 OFFICE O/P NEW LOW 30 MIN: CPT | Mod: S$GLB,,, | Performed by: FAMILY MEDICINE

## 2019-11-19 PROCEDURE — 80069 RENAL FUNCTION PANEL: CPT

## 2019-11-19 PROCEDURE — 36415 COLL VENOUS BLD VENIPUNCTURE: CPT | Mod: PO

## 2019-11-19 PROCEDURE — 85025 COMPLETE CBC W/AUTO DIFF WBC: CPT

## 2019-11-19 PROCEDURE — 3078F DIAST BP <80 MM HG: CPT | Mod: CPTII,S$GLB,, | Performed by: FAMILY MEDICINE

## 2019-11-19 RX ORDER — ROSUVASTATIN CALCIUM 10 MG/1
10 TABLET, COATED ORAL NIGHTLY
Qty: 90 TABLET | Refills: 3 | Status: SHIPPED | OUTPATIENT
Start: 2019-11-19 | End: 2020-03-10 | Stop reason: SDUPTHER

## 2019-11-19 RX ORDER — LORAZEPAM 1 MG/1
1 TABLET ORAL NIGHTLY
COMMUNITY

## 2019-11-19 RX ORDER — LEVOTHYROXINE SODIUM 75 UG/1
75 TABLET ORAL DAILY
Qty: 90 TABLET | Refills: 3 | Status: SHIPPED | OUTPATIENT
Start: 2019-11-19 | End: 2020-03-10 | Stop reason: SDUPTHER

## 2019-11-19 NOTE — PROGRESS NOTES
Subjective:       Patient ID: Andra Newell is a 59 y.o. female.    Chief Complaint: Establish Care    HPI   Patient presents to discuss the [facility] program and for medication refills as she has not been seen by her previous family doctor (Dr. Cohen) in over a year and was advised she could not receive further refills without being seen back or establishing with a new family doctor before the new year.  She is followed by a multitude of specialists within our system including pain management, the transplant team, endocrinology, back and spine, Neurology, Infectious Disease, Urogyn, gyn, sports Medicine and Gastroenterology and outside of our system by nephrology (Dr. Heaton) and Psychiatry (Dr. Sheth).  She tells me she has been doing good except the back and notes that her recent spinal injection failed and she continues to have chronic low back pain post multiple injections and ablation.  She has tried to avoid chronic pain medications with kidney transplant but tells me pain management does not have other options outside of surgery recommended by her neurosurgeon she may have to rely on pain medications chronically.  She does note that her nerve ablation reduced her pains by about 60% and these are now only dull nonradiating pain in the low back although the can be very severe at times.  She tells me pains are currently mild at rest and she has not had any decrease in strength, coordination, sensation or active range of motion from baseline or any change in bowel or bladder without any current urinary complaints and without any further problems with diarrhea.  She notes 1 of her previous medications was causing chronic diarrhea and colonoscopy and workup from GI found no cause but symptoms completely resolved after the medication was discontinued months ago.  She has a very long and complicated past medical history as below and in the problem list but tells me she is doing well on all of her  "current medications and has no other concerns or complaints today.  She denies any signs or symptoms of hypothyroidism or hyperthyroidism on her current dosing of Synthroid.      Review of Systems   Constitutional: Positive for fatigue. Negative for activity change, appetite change, fever and unexpected weight change.   HENT: Negative for congestion, hearing loss, sore throat, trouble swallowing and voice change.    Eyes: Negative for visual disturbance.   Respiratory: Negative for apnea, cough, chest tightness, shortness of breath and wheezing.    Cardiovascular: Negative for chest pain, palpitations and leg swelling.   Gastrointestinal: Negative for abdominal pain, blood in stool, constipation, diarrhea, nausea and vomiting.   Endocrine: Negative for cold intolerance, heat intolerance, polydipsia, polyphagia and polyuria.   Genitourinary: Negative for difficulty urinating, dysuria, enuresis, flank pain, frequency, hematuria, pelvic pain and urgency.   Musculoskeletal: Positive for back pain. Negative for arthralgias, gait problem, joint swelling, myalgias, neck pain and neck stiffness.   Skin: Negative for rash and wound.   Neurological: Negative for dizziness, tremors, syncope, weakness, light-headedness, numbness and headaches.   Hematological: Negative for adenopathy. Does not bruise/bleed easily.   Psychiatric/Behavioral: Negative for dysphoric mood and sleep disturbance. The patient is not nervous/anxious.          Objective:      Vitals:    11/19/19 0906   BP: 138/68   Pulse: 78   Temp: 98.2 °F (36.8 °C)   TempSrc: Oral   Weight: 100.7 kg (222 lb 0.1 oz)   Height: 5' 7" (1.702 m)   PainSc: 0-No pain     Physical Exam   Constitutional: She is oriented to person, place, and time. She appears well-developed and well-nourished. No distress.   HENT:   Head: Normocephalic and atraumatic.   Cardiovascular: Normal rate and regular rhythm. Exam reveals no gallop and no friction rub.   Murmur heard.   Systolic murmur " is present with a grade of 1/6.  Pulmonary/Chest: Effort normal and breath sounds normal. No respiratory distress. She has no wheezes. She exhibits no tenderness.   Abdominal: Soft. Bowel sounds are normal. She exhibits no distension and no mass. There is no tenderness. There is no rebound and no guarding.   Musculoskeletal: Normal range of motion. She exhibits no edema, tenderness or deformity.   Neurological: She is alert and oriented to person, place, and time.   Skin: Skin is warm and dry. She is not diaphoretic.   Psychiatric: She has a normal mood and affect. Her behavior is normal. Judgment and thought content normal.   Nursing note and vitals reviewed.        Assessment:       1. Mixed diabetic hyperlipidemia associated with type 2 diabetes mellitus    2. Hypertension associated with stage 3 chronic kidney disease due to type 2 diabetes mellitus    3. Status post living-donor kidney transplantation    4. Uncontrolled type 2 diabetes mellitus with diabetic nephropathy, with long-term current use of insulin    5. Acquired hypothyroidism    6. Primary osteoarthritis involving multiple joints    7. History of colon polyps    8. Anemia of chronic disease    9. Secondary hyperparathyroidism of renal origin    10. Vitamin D deficiency    11. Obesity (BMI 30-39.9)    12. Mild intermittent asthma without complication    13. Anxiety and depression    14. Frequent UTI    15. Chronic pain syndrome    16. Gastroesophageal reflux disease without esophagitis    17. Screening mammogram, encounter for          Plan:   Mixed diabetic hyperlipidemia associated with type 2 diabetes mellitus  -     rosuvastatin (CRESTOR) 10 MG tablet; Take 1 tablet (10 mg total) by mouth every evening.  Dispense: 90 tablet; Refill: 3  -     Lipid panel; Future; Expected date: 11/19/2019    Hypertension associated with stage 3 chronic kidney disease due to type 2 diabetes mellitus    Status post living-donor kidney transplantation    Uncontrolled  type 2 diabetes mellitus with diabetic nephropathy, with long-term current use of insulin    Acquired hypothyroidism  -     levothyroxine (SYNTHROID) 75 MCG tablet; Take 1 tablet (75 mcg total) by mouth once daily.  Dispense: 90 tablet; Refill: 3    Primary osteoarthritis involving multiple joints    History of colon polyps    Anemia of chronic disease    Secondary hyperparathyroidism of renal origin  -     PTH, intact; Future; Expected date: 11/19/2019    Vitamin D deficiency  -     Vitamin D; Future; Expected date: 11/19/2019    Obesity (BMI 30-39.9)    Mild intermittent asthma without complication    Anxiety and depression    Frequent UTI    Chronic pain syndrome    Gastroesophageal reflux disease without esophagitis    Screening mammogram, encounter for  -     Mammo Digital Screening Bilat; Future; Expected date: 11/19/2019      Follow up if symptoms worsen or fail to improve.    Andra appears to be stable and doing well today with exception of her chronic back pains which are still limiting her activities of daily living.  She will continue to follow with pain management for this.  I discussed the [facility] program with her and she was very interested in getting established with Dr. Blevins for this.  She was given appointment to establish care for 12/10/2019.  I reviewed the risks and benefits of Crestor and Synthroid with her and appears she has been doing well on the medications.  I reviewed her past labs and her last lipid panel was in April of this year which showed a normal total cholesterol at 129, elevated triglycerides at 314, low HDL at 25 and an optimal LDL at 41.2.  I recommended repeating a lipid panel and she was in agreement with this.  Her liver enzymes were normal on 10/14/2019.  She had a normal TSH on 10/02/2019.  I provided her with both refills unchanged.  It appears she has not had a vitamin-D level since January of this year and has not had a parathyroid hormone recheck since her kidney  transplant.  She was previously on 87745 units of vitamin-D once weekly for 2-3 months and was very interested in rechecking a vitamin-D level and parathyroid hormone although I advised her that this may have resolved after her kidney transplant and she now only has stage 3 chronic kidney disease. I placed both orders for her.  She is up-to-date with all health maintenance issues at this time with exception of screening mammogram which she was interested in completing in the near future as she tells me she received both her Shingrix vaccines from [facility] in mid to late 2018.  I have asked my nurse to obtain this vaccination record to update her health maintenance and have placed a future order for screening mammogram.  As she will be establishing with the [facility] team I recommended no routine follow-up and will see her back as needed in the future if she has any problems and cannot be seen by Dr. Blevins.      Andra was seen for a 30 minute visit and greater than half this time was spent counseling on the above.

## 2019-11-20 ENCOUNTER — PATIENT OUTREACH (OUTPATIENT)
Dept: ADMINISTRATIVE | Facility: HOSPITAL | Age: 59
End: 2019-11-20

## 2019-11-20 ENCOUNTER — PATIENT MESSAGE (OUTPATIENT)
Dept: ADMINISTRATIVE | Facility: HOSPITAL | Age: 59
End: 2019-11-20

## 2019-11-20 LAB — TACROLIMUS BLD-MCNC: 6.9 NG/ML (ref 5–15)

## 2019-11-21 ENCOUNTER — PATIENT OUTREACH (OUTPATIENT)
Dept: ADMINISTRATIVE | Facility: HOSPITAL | Age: 59
End: 2019-11-21

## 2019-11-21 ENCOUNTER — RESEARCH ENCOUNTER (OUTPATIENT)
Dept: RESEARCH | Facility: HOSPITAL | Age: 59
End: 2019-11-21
Payer: MEDICARE

## 2019-11-21 ENCOUNTER — LAB VISIT (OUTPATIENT)
Dept: LAB | Facility: HOSPITAL | Age: 59
End: 2019-11-21
Payer: MEDICARE

## 2019-11-21 VITALS
WEIGHT: 222.44 LBS | DIASTOLIC BLOOD PRESSURE: 77 MMHG | SYSTOLIC BLOOD PRESSURE: 140 MMHG | TEMPERATURE: 98 F | HEART RATE: 82 BPM | BODY MASS INDEX: 34.84 KG/M2 | RESPIRATION RATE: 16 BRPM

## 2019-11-21 DIAGNOSIS — Z94.0 KIDNEY REPLACED BY TRANSPLANT: ICD-10-CM

## 2019-11-21 DIAGNOSIS — N25.81 SECONDARY HYPERPARATHYROIDISM OF RENAL ORIGIN: ICD-10-CM

## 2019-11-21 DIAGNOSIS — E55.9 VITAMIN D DEFICIENCY: ICD-10-CM

## 2019-11-21 DIAGNOSIS — E11.69 MIXED DIABETIC HYPERLIPIDEMIA ASSOCIATED WITH TYPE 2 DIABETES MELLITUS: ICD-10-CM

## 2019-11-21 DIAGNOSIS — E78.2 MIXED DIABETIC HYPERLIPIDEMIA ASSOCIATED WITH TYPE 2 DIABETES MELLITUS: ICD-10-CM

## 2019-11-21 DIAGNOSIS — Z00.6 RESEARCH STUDY PATIENT: ICD-10-CM

## 2019-11-21 LAB
25(OH)D3+25(OH)D2 SERPL-MCNC: 27 NG/ML (ref 30–96)
CHOLEST SERPL-MCNC: 138 MG/DL (ref 120–199)
CHOLEST/HDLC SERPL: 3.8 {RATIO} (ref 2–5)
DRUG STUDY SPECIMEN TYPE: NORMAL
DRUG STUDY TEST NAME: NORMAL
DRUG STUDY TEST RESULT: NORMAL
HDLC SERPL-MCNC: 36 MG/DL (ref 40–75)
HDLC SERPL: 26.1 % (ref 20–50)
LDLC SERPL CALC-MCNC: 72.4 MG/DL (ref 63–159)
NONHDLC SERPL-MCNC: 102 MG/DL
PTH-INTACT SERPL-MCNC: 83 PG/ML (ref 9–77)
TRIGL SERPL-MCNC: 148 MG/DL (ref 30–150)

## 2019-11-21 PROCEDURE — 80061 LIPID PANEL: CPT

## 2019-11-21 PROCEDURE — 82306 VITAMIN D 25 HYDROXY: CPT

## 2019-11-21 PROCEDURE — 83970 ASSAY OF PARATHORMONE: CPT

## 2019-11-21 PROCEDURE — 36415 COLL VENOUS BLD VENIPUNCTURE: CPT

## 2019-11-21 PROCEDURE — 99000 SPECIMEN HANDLING OFFICE-LAB: CPT

## 2019-11-21 NOTE — LETTER
AUTHORIZATION FOR RELEASE OF   CONFIDENTIAL INFORMATION    Dr Arias    We are seeing Andra Newell, date of birth 1960, in the clinic at [facility]. Antonio Azevedo DO is the patient's PCP. Andra Newell has an outstanding lab/procedure at the time we reviewed her chart. In order to help keep her health information updated, she has authorized us to request the following medical record(s):        (  )  MAMMOGRAM                                      (  )  COLONOSCOPY      (  )  PAP SMEAR                                          (  )  OUTSIDE LAB RESULTS     (  )  DEXA SCAN                                          (X  )  EYE EXAM    2019     (  )  FOOT EXAM                                          (  )  ENTIRE RECORD     (  )  OUTSIDE IMMUNIZATIONS                 (  )  _______________         Please fax records to [facility], Kenneth M Long, DO,[phone]     Thank you in advance,      Kristie HARDIN  Panel Care Coordinator  [facility] [facility] [address] [place] [address]  Phone [phone]  Fax [phone]        Patient Name: Andra Newell  : 1960  Patient Phone #: [phone]

## 2019-11-22 NOTE — PROGRESS NOTES
[facility] CMV Prevention (Kidney Transplant)  Protocol: [id number]  IRB# [id number]  PI: Nicholas GUAN)  Site: [id number]     Subject #[id number]     Visit 17 / Week 44     Date of Visit: 21Nov2019     Patient arrived at the clinic to complete her V17/WK44 visit. Upon patient's arrival, she was placed in a private patient room and given the most recent main ICF (approved by the IRB on 11/11/19) and the most recent [employer] ICF (approved by the IRB on 6/29/19) to read and ask questions.  Once all questions were answered, the ICFs were signed by the patient and CRC.  A copy of the ICFs were given to the patient and study visit procedures beagn.      Protocol labs were drawn at 0930.  Specimens were processed and shipped as per protocol. , Evette Yoder, conducted the visit in its entirety with Sharri Sands present. Patient agrees to continue his participation in the study and all questions answered.  Patient reports no concomitant medications changes, no new AE's/SAEs or changes in his duarte status from last visit; however, she did have a radiofrequency ablation on 10/29/19.  This was added to source and EDC.  Epic was reviewed prior to patient's visit. No hospitalizations, recent fever, diarrhea or cold like symptoms.    Vitals:    11/21/19 1010   BP: (!) 140/77   Patient Position: Sitting   Pulse: 82   Resp: 16   Temp: 98.3 °F (36.8 °C)   TempSrc: Oral   Weight: 100.9 kg (222 lb 7.1 oz)     Current Outpatient Medications:     blood sugar diagnostic (BLOOD GLUCOSE TEST) Strp, Checks her bg 4 times a day   e11.9, Disp: 400 strip, Rfl: 4    famotidine (PEPCID) 20 MG tablet, Take 1 tablet (20 mg total) by mouth 2 (two) times daily., Disp: 60 tablet, Rfl: 11    insulin regular hum U-500 conc (HUMULIN R U-500, CONC, KWIKPEN) 500 unit/mL (3 mL) InPn, 100 units breakfast and dinner, Disp: 3 Syringe, Rfl: 12    levothyroxine (SYNTHROID) 75 MCG tablet, Take 1 tablet (75 mcg total) by mouth  once daily., Disp: 90 tablet, Rfl: 3    LORazepam (ATIVAN) 1 MG tablet, Take 1 mg by mouth every evening., Disp: , Rfl:     magnesium oxide 500 mg Tab, Take 500 mg by mouth once daily., Disp: , Rfl: 0    methocarbamol (ROBAXIN-750) 750 MG Tab, Take 1 tablet (750 mg total) by mouth every 8 (eight) hours as needed (muscle spasm/ pain)., Disp: 40 tablet, Rfl: 0    multivitamin (THERAGRAN) tablet, Take 1 tablet by mouth once daily., Disp: , Rfl:     mycophenolate (CELLCEPT) 250 mg Cap, Take 2 capsules (500 mg total) by mouth 2 (two) times daily., Disp: 120 capsule, Rfl: 11    rosuvastatin (CRESTOR) 10 MG tablet, Take 1 tablet (10 mg total) by mouth every evening., Disp: 90 tablet, Rfl: 3    sulfamethoxazole-trimethoprim 400-80mg (BACTRIM,SEPTRA) 400-80 mg per tablet, Take 1 tablet by mouth once daily., Disp: 30 tablet, Rfl: 6    tacrolimus (PROGRAF) 0.5 MG Cap, Take 1 capsule (0.5 mg total) by mouth every evening. Total 2mg in AM PO and 1.5mg PO in PM Z94.0, Disp: 30 capsule, Rfl: 11    tacrolimus (PROGRAF) 1 MG Cap, Take 2 capsules (2 mg total) by mouth every morning AND 1 capsule (1 mg total) every evening. Total 2mg in AM PO and 1.5mg in PM PO. Z94.0., Disp: 90 capsule, Rfl: 11    vilazodone (VIIBRYD) 40 mg Tab tablet, Take 1 tablet (40 mg total) by mouth once daily., Disp: 30 tablet, Rfl: 4    Current Facility-Administered Medications:     sodium chloride 0.9% bolus 2,000 mL, 2,000 mL, Intravenous, Once, Maureen Cabral MD    CMV Disease Assessment, Health Outcomes Assessment, and Renal Transplant Outcomes sections were completed.       Study participant was reminded to call site/ for any questions.  Next appointment on 19Dec2019.

## 2019-11-25 ENCOUNTER — TELEPHONE (OUTPATIENT)
Dept: SPINE | Facility: CLINIC | Age: 59
End: 2019-11-25

## 2019-11-25 DIAGNOSIS — M48.062 LUMBAR STENOSIS WITH NEUROGENIC CLAUDICATION: Primary | ICD-10-CM

## 2019-11-25 LAB — B19V DNA # SPEC NAA+PROBE: 471 COPIES/ML

## 2019-11-25 NOTE — TELEPHONE ENCOUNTER
Dr. Ferrer is suggesting a caudal IVETH to improve pain if no improvement with the radio frequency ablation.  I think it would be good to try.  Trying any and all conservative treatments before considering surgery is always the best thing to do.

## 2019-11-25 NOTE — TELEPHONE ENCOUNTER
----- Message from Chapincito Lagos sent at 11/25/2019  4:08 PM CST -----  Contact: Patient  Type:  Patient Returning Call    Who Called:  Patient  Who Left Message for Patient:  Alanna Ponce  Does the patient know what this is regarding?:  Yes, see previous message  Best Call Back Number:  [phone]  Additional Information:  NA

## 2019-11-25 NOTE — TELEPHONE ENCOUNTER
Spoke with patient and she said she does not want to have anymore procedures done by Dr. Ferrer. Maite said the next step is to see a surgeon and the patient agreed. Neurosurgery nurse will call her to schedule.

## 2019-11-27 ENCOUNTER — PATIENT OUTREACH (OUTPATIENT)
Dept: ADMINISTRATIVE | Facility: HOSPITAL | Age: 59
End: 2019-11-27

## 2019-12-02 ENCOUNTER — PATIENT MESSAGE (OUTPATIENT)
Dept: ENDOCRINOLOGY | Facility: CLINIC | Age: 59
End: 2019-12-02

## 2019-12-03 ENCOUNTER — HOSPITAL ENCOUNTER (OUTPATIENT)
Dept: RADIOLOGY | Facility: HOSPITAL | Age: 59
Discharge: HOME | End: 2019-12-03
Attending: FAMILY MEDICINE
Payer: MEDICARE

## 2019-12-03 DIAGNOSIS — Z12.31 SCREENING MAMMOGRAM, ENCOUNTER FOR: ICD-10-CM

## 2019-12-03 PROCEDURE — 77067 SCR MAMMO BI INCL CAD: CPT | Mod: TC

## 2019-12-03 PROCEDURE — 77067 MAMMO DIGITAL SCREENING BILAT WITH TOMOSYNTHESIS_CAD: ICD-10-PCS | Mod: 26,,, | Performed by: RADIOLOGY

## 2019-12-03 PROCEDURE — 77063 BREAST TOMOSYNTHESIS BI: CPT | Mod: 26,,, | Performed by: RADIOLOGY

## 2019-12-03 PROCEDURE — 77067 SCR MAMMO BI INCL CAD: CPT | Mod: 26,,, | Performed by: RADIOLOGY

## 2019-12-03 PROCEDURE — 77063 MAMMO DIGITAL SCREENING BILAT WITH TOMOSYNTHESIS_CAD: ICD-10-PCS | Mod: 26,,, | Performed by: RADIOLOGY

## 2019-12-09 NOTE — PROGRESS NOTES
Primary Care Provider Appointment    Subjective:      Patient ID: Andra Newell is a 59 y.o. female with history of HTN, HLD, T2DM c/b ESRD s/p living L kidney transplant 1/14/2019, On immunosupressant, DJD, Asthma, hypothyroidism, HPT    Chief Complaint: [facility]     female  Disabled since 2015 due to DDD and severe osteoarthritis of knee and hips  Previously worked as  at [employer]  Lives in residential 1 story home with partner, Any.   Has 2 sons, 1 lives in [place], 1 in [place].     Major concern is back pain. Pt currently on pain medication for neurogenic claudication secondary to lumbar stenosis. Failed lumbar MBB. Has f/u with neurosurgery tomorrow to discuss treatment options. Pt does not want to stay on pain medications long term. Pain is currently well controlled on medications (unsure of name).     Discussed DM. Now has Cody, checking bs 4x daily, no log today. Insulin recently titrated by endo. Discussed diet modifications, encourage portion control using plate method.     Pt encourage to restart exercising. Pt to attend yoga or aquatic therapy 2x weekly, time exercise sessions for 1 hour after pain medication administration.     Followed by:  Optometrist: Dr. Arias. Recently had annual exam, records requested.   Endocrinology: Mana Webber NP. Last seen 10/2/19. D/c tresiba and novolog. Start U500 kwikpen 80 u tid. Continue to test bg 4 times a day. RTC in 3 months with labs.   Infectious Disease: Dr. Cammie Kessler. Last seen 5/31/19. Resolution of UTI, diarrhea, and anemia after course of IVIg, withdrawal from letermovir trial, and decrease in immunosuppression.  Neurology: Dr. Ashish Walden. Last seen 7/29/19. F/u post renal transplant for neurogenic claudication secondary to lumbar stenosis. Has a left side achilles tendon rupture w/o repair. Her gait instability is a combination of both problems. Advised to see back and spine surgery for planning  for surgery. Referred to EMG/NCS  Prognostically. f/u with neurology in 3 months.  Pain Mgmt: Dr. Yonny Ferrer. Last seen 11/11/19.  s/p right L2-5 MB RFA on 10/29/19 and reports about 50% relief so far. If pain persist, may consider Caudal IVETH. F/u PRN   Transplant: Dr. Maureen Cabral. Last seen 10/28/19. resume CellCept with close monitoring of her white count.  Will start at low dose of 250 mg, 1 cap twice daily. Continue serial monitoring of parvovirus PCR to ensure counts do not worsen.   Urology: Dr. Jacqui Artis. Last seen 4/10/19. Followed for recurrent UTI and urgency. Urethroscopy:  Normal.  Cystoscopy:  Normal bladder mucosa, right dome implanted ureter noted with + efflux. RTC prn   Nephrology: Dr. Heaton: Last visit 05/2019, RTC 01/2020. Will request records.   Psychiatry: Dr. Sheth: Last seen 10/2019. No changes per pt. RTC in 3 mths.   Therapist: Mague Muniz. Sees weekly for CBT.         Past Surgical History:   Procedure Laterality Date    ACHILLES TENDON SURGERY Left May 2015    BACK SURGERY      CHOLECYSTECTOMY      COLONOSCOPY N/A 9/6/2017    Procedure: COLONOSCOPY;  Surgeon: Marisol Bryson MD;  Location: [facility];  Service: Endoscopy;  Laterality: N/A; REPEAT IN 3 YEARS FOR SURVEILLANCE    COLONOSCOPY N/A 5/9/2019    Procedure: COLONOSCOPY;  Surgeon: Fady Ewing MD;  [facility]  Service: Endoscopy;  Laterality: N/A;    DIALYSIS FISTULA CREATION  12/2017    ESOPHAGOGASTRODUODENOSCOPY N/A 5/9/2019    Procedure: EGD (ESOPHAGOGASTRODUODENOSCOPY);  Surgeon: Fady Ewing MD;  Location: [facility] (2ND FLR);  Service: Endoscopy;  Laterality: N/A;    HEMORRHOID SURGERY      INJECTION OF ANESTHETIC AGENT AROUND MEDIAL BRANCH NERVES INNERVATING LUMBAR FACET JOINT Right 9/25/2019    Procedure: Block-nerve-medial branch-lumbar;  Surgeon: Yonny Ferrer MD;  [facility];  Service: Pain Management;  Laterality: Right;  L2, 3, 4,5     INJECTION OF ANESTHETIC  AGENT AROUND MEDIAL BRANCH NERVES INNERVATING LUMBAR FACET JOINT Right 10/16/2019    Procedure: Block-nerve-medial branch-lumbar;  Surgeon: Yonny Ferrer MD;  Location: [facility] OR;  Service: Pain Management;  Laterality: Right;  L2, 3, 4,5     JOINT REPLACEMENT      left    KIDNEY TRANSPLANT N/A 1/14/2019    Procedure: TRANSPLANT, KIDNEY;  Surgeon: Roland Salazar MD;  Location: [facility] OR 2ND FLR;  Service: Transplant;  Laterality: N/A;    KNEE SURGERY      RADIOFREQUENCY ABLATION OF LUMBAR MEDIAL BRANCH NERVE AT SINGLE LEVEL Right 10/29/2019    Procedure: Radiofrequency Ablation, Nerve, Spinal, Lumbar, Medial Branch, 1 Level;  Surgeon: Yonny Ferrer MD;  Location: [facility] OR;  Service: Pain Management;  Laterality: Right;  L2, 3, 4, 5  burned at 80 degrees C X 60 seconds each site X 2    SHOULDER ARTHROSCOPY      SHOULDER SURGERY      UPPER GASTROINTESTINAL ENDOSCOPY  ~2013    Dr. Moralez       Past Medical History:   Diagnosis Date    Anemia     Anemia in chronic kidney disease, on chronic dialysis 7/12/2017    Anxiety and depression     Aplastic anemia with parvovirus B19 infection 5/27/2019    Arthritis     Asthma     allergic airway    CKD stage III (moderate) 2/18/2019    Colon polyp     Depression     Diabetes mellitus     Diverticulosis     Eosinophilia     ESRD (end stage renal disease)     stage V, due for living donor 9/2018    ESRD on dialysis     GERD (gastroesophageal reflux disease)     Gout     Gout     Hyperlipidemia     Hypertension     Hypertriglyceridemia without hypercholesterolemia 6/9/2019    Low back pain     Low HDL (under 40) 6/9/2019    MRSA carrier     Neutropenia, unspecified 5/8/2019    Obesity     Renal disorder     Rotator cuff syndrome--left     Thyroid disease     Ulnar neuropathy of right upper extremity     Uncontrolled type 2 diabetes mellitus with hyperglycemia        Social History     Socioeconomic History    Marital status: Significant Other     Spouse  name: Any Alvarez    Number of children: 2    Years of education: Not on file    Highest education level: 12th grade   Occupational History    Occupation: retired     Comment:    Social Needs    Financial resource strain: Not hard at all    Food insecurity:     Worry: Never true     Inability: Never true    Transportation needs:     Medical: No     Non-medical: No   Tobacco Use    Smoking status: Never Smoker    Smokeless tobacco: Never Used   Substance and Sexual Activity    Alcohol use: No    Drug use: No    Sexual activity: Not Currently   Lifestyle    Physical activity:     Days per week: 0 days     Minutes per session: 0 min    Stress: Very much   Relationships    Social connections:     Talks on phone: Three times a week     Gets together: Once a week     Attends Gnosticism service: 1 to 4 times per year     Active member of club or organization: No     Attends meetings of clubs or organizations: Never     Relationship status: Living with partner   Other Topics Concern    Not on file   Social History Narrative     female    Disabled since 2015 due to DDD and severe osteoarthritis of knee and hips    Previously worked as  at [employer]    Lives in residential 1 story home with partner, Any.     Has 2 boys, 1 lives in [place], 1 in [place].         Nondenominational: [employer]ism    Hobbies: painting and crafts.         Review of Systems   Constitutional: Positive for fatigue. Negative for activity change, appetite change, fever and unexpected weight change.   HENT: Negative for congestion, hearing loss, sore throat, trouble swallowing and voice change.    Eyes: Negative for visual disturbance.   Respiratory: Negative for apnea, cough, chest tightness, shortness of breath and wheezing.    Cardiovascular: Negative for chest pain, palpitations and leg swelling.   Gastrointestinal: Negative for abdominal pain, blood in stool, constipation, diarrhea,  "nausea and vomiting.   Endocrine: Negative for cold intolerance, heat intolerance, polydipsia, polyphagia and polyuria.   Genitourinary: Negative for difficulty urinating, dysuria, enuresis, flank pain, frequency, hematuria, pelvic pain and urgency.   Musculoskeletal: Positive for back pain. Negative for arthralgias, gait problem, joint swelling, myalgias, neck pain and neck stiffness.   Skin: Negative for rash and wound.   Neurological: Negative for dizziness, tremors, syncope, weakness, light-headedness, numbness and headaches.   Hematological: Negative for adenopathy. Does not bruise/bleed easily.   Psychiatric/Behavioral: Negative for dysphoric mood and sleep disturbance. The patient is not nervous/anxious.        Objective:   /70 (BP Location: Right arm, Patient Position: Sitting, BP Method: Large (Manual))   Pulse 84   Temp 98.3 °F (36.8 °C) (Oral)   Ht 5' 7" (1.702 m)   Wt 101.5 kg (223 lb 12.3 oz)   LMP 02/28/2012   SpO2 97%   BMI 35.05 kg/m²     Physical Exam   Constitutional: She is oriented to person, place, and time. She appears well-developed and well-nourished. No distress.   HENT:   Head: Normocephalic and atraumatic.   Cardiovascular: Normal rate and regular rhythm. Exam reveals no gallop and no friction rub.   Murmur heard.   Systolic murmur is present with a grade of 1/6.  Pulmonary/Chest: Effort normal and breath sounds normal. No respiratory distress. She has no wheezes. She exhibits no tenderness.   Abdominal: Soft. Bowel sounds are normal. She exhibits no distension and no mass. There is no tenderness. There is no rebound and no guarding.   Musculoskeletal: Normal range of motion. She exhibits no edema, tenderness or deformity.   Neurological: She is alert and oriented to person, place, and time.   Skin: Skin is warm and dry. She is not diaphoretic.   Psychiatric: She has a normal mood and affect. Her behavior is normal. Judgment and thought content normal.   Nursing note and " vitals reviewed.          Lab Results   Component Value Date    WBC 6.18 11/19/2019    HGB 12.0 11/19/2019    HCT 39.5 11/19/2019     11/19/2019    CHOL 138 11/21/2019    TRIG 148 11/21/2019    HDL 36 (L) 11/21/2019    ALT 27 10/14/2019    AST 24 10/14/2019     11/19/2019    K 4.8 11/19/2019     11/19/2019    CREATININE 1.1 11/19/2019    BUN 18 11/19/2019    CO2 29 11/19/2019    TSH 1.026 10/02/2019    INR 1.1 05/07/2019    HGBA1C 8.5 (H) 10/02/2019       Current Outpatient Medications on File Prior to Visit   Medication Sig Dispense Refill    blood sugar diagnostic (BLOOD GLUCOSE TEST) Strp Checks her bg 4 times a day   e11.9 400 strip 4    famotidine (PEPCID) 20 MG tablet Take 1 tablet (20 mg total) by mouth 2 (two) times daily. 60 tablet 11    insulin regular hum U-500 conc (HUMULIN R U-500, CONC, KWIKPEN) 500 unit/mL (3 mL) InPn 100 units breakfast and dinner 3 Syringe 12    levothyroxine (SYNTHROID) 75 MCG tablet Take 1 tablet (75 mcg total) by mouth once daily. 90 tablet 3    LORazepam (ATIVAN) 1 MG tablet Take 1 mg by mouth every evening.      magnesium oxide 500 mg Tab Take 500 mg by mouth once daily.  0    multivitamin (THERAGRAN) tablet Take 1 tablet by mouth once daily.      mycophenolate (CELLCEPT) 250 mg Cap Take 2 capsules (500 mg total) by mouth 2 (two) times daily. 120 capsule 11    rosuvastatin (CRESTOR) 10 MG tablet Take 1 tablet (10 mg total) by mouth every evening. 90 tablet 3    sulfamethoxazole-trimethoprim 400-80mg (BACTRIM,SEPTRA) 400-80 mg per tablet Take 1 tablet by mouth once daily. 30 tablet 6    tacrolimus (PROGRAF) 0.5 MG Cap Take 1 capsule (0.5 mg total) by mouth every evening. Total 2mg in AM PO and 1.5mg PO in PM Z94.0 30 capsule 11    tacrolimus (PROGRAF) 1 MG Cap Take 2 capsules (2 mg total) by mouth every morning AND 1 capsule (1 mg total) every evening. Total 2mg in AM PO and 1.5mg in PM PO. Z94.0. 90 capsule 11    vilazodone (VIIBRYD) 40 mg Tab  tablet Take 1 tablet (40 mg total) by mouth once daily. 30 tablet 4    [DISCONTINUED] methocarbamol (ROBAXIN-750) 750 MG Tab Take 1 tablet (750 mg total) by mouth every 8 (eight) hours as needed (muscle spasm/ pain). 40 tablet 0     Current Facility-Administered Medications on File Prior to Visit   Medication Dose Route Frequency Provider Last Rate Last Dose    sodium chloride 0.9% bolus 2,000 mL  2,000 mL Intravenous Once Maureen Cabral MD             Assessment:   59 y.o. female with multiple co-morbid illnesses here to establish care with new PCP and continue chronic care management.     Plan:     Problem List Items Addressed This Visit        Psychiatric    Benzodiazepine dependence, continuous     Followed by psychiatry, Dr. Sheth  On ativan 1mg nightly for anxiety   - hospitalized once for anxiety and depression 3-4 years ago  - currently exacerbated due to pain  - seeing therapist once weekly for CBT  - requested records from psychiatry             Cardiac/Vascular    Hypertension associated with stage 3 chronic kidney disease due to type 2 diabetes mellitus - Primary     BP currently controlled with lifestyle modifications   - encourage to restart exercise  - encourage low salt diet             Immunology/Multi System    Immunosuppression     s/p living L kidney transplant 1/14/2019  - followed by transplant team and ID  - On cellcept and profgraf            Oncology    Anemia of chronic disease    Chronic anemia     H/H 12/39, stable.   - will cont to monitor             Endocrine    Uncontrolled type 2 diabetes mellitus with diabetic nephropathy, with long-term current use of insulin     Followed by endocrinology  - On  U500 kwikpen 105 units in am and 100 units in PM   - Now has Cody, checking bs 4x daily, no log today. Insulin recently titrated by endo.   - Discussed diet modifications, encourage portion control using plate method.            Acquired hypothyroidism     Tsh/ free t4  1.02/0.95 at goal  - cont synthroid 75mcg daily                Health Maintenance       Date Due Completion Date    Shingles Vaccine (1 of 2) 02/15/2008 ---    Eye Exam 05/16/2019 5/16/2018    Hemoglobin A1c 01/02/2020 10/2/2019    Foot Exam 10/02/2020 10/2/2019 (Done)    Override on 10/2/2019: Done    Lipid Panel 11/21/2020 11/21/2019    Mammogram 12/03/2020 12/3/2019    Pap Smear with HPV Cotest 09/04/2021 9/4/2018    Pneumococcal Vaccine (Highest Risk) (3 of 3 - PPSV23) 04/30/2024 4/30/2019    Colonoscopy 05/09/2024 5/9/2019    TETANUS VACCINE 07/12/2027 7/12/2017          Follow up in about 4 weeks (around 1/7/2020) for DM management . Total face-to-face time was 60 min, 50% of this was spent on counseling and coordination of care.     GHULAM Aceves-C  Family Medicine  [facility] [place]

## 2019-12-10 ENCOUNTER — OFFICE VISIT (OUTPATIENT)
Dept: FAMILY MEDICINE | Facility: CLINIC | Age: 59
End: 2019-12-10
Payer: MEDICARE

## 2019-12-10 VITALS
WEIGHT: 223.75 LBS | HEART RATE: 84 BPM | SYSTOLIC BLOOD PRESSURE: 132 MMHG | DIASTOLIC BLOOD PRESSURE: 70 MMHG | HEIGHT: 67 IN | TEMPERATURE: 98 F | BODY MASS INDEX: 35.12 KG/M2 | OXYGEN SATURATION: 97 %

## 2019-12-10 DIAGNOSIS — D64.9 CHRONIC ANEMIA: ICD-10-CM

## 2019-12-10 DIAGNOSIS — N18.30 HYPERTENSION ASSOCIATED WITH STAGE 3 CHRONIC KIDNEY DISEASE DUE TO TYPE 2 DIABETES MELLITUS: Primary | ICD-10-CM

## 2019-12-10 DIAGNOSIS — I12.9 HYPERTENSION ASSOCIATED WITH STAGE 3 CHRONIC KIDNEY DISEASE DUE TO TYPE 2 DIABETES MELLITUS: Primary | ICD-10-CM

## 2019-12-10 DIAGNOSIS — E11.22 HYPERTENSION ASSOCIATED WITH STAGE 3 CHRONIC KIDNEY DISEASE DUE TO TYPE 2 DIABETES MELLITUS: Primary | ICD-10-CM

## 2019-12-10 DIAGNOSIS — D63.8 ANEMIA OF CHRONIC DISEASE: ICD-10-CM

## 2019-12-10 DIAGNOSIS — F13.20 BENZODIAZEPINE DEPENDENCE, CONTINUOUS: ICD-10-CM

## 2019-12-10 DIAGNOSIS — D84.9 IMMUNOSUPPRESSION: ICD-10-CM

## 2019-12-10 DIAGNOSIS — E03.9 ACQUIRED HYPOTHYROIDISM: ICD-10-CM

## 2019-12-10 PROCEDURE — 3078F DIAST BP <80 MM HG: CPT | Mod: CPTII,S$GLB,, | Performed by: NURSE PRACTITIONER

## 2019-12-10 PROCEDURE — 3008F PR BODY MASS INDEX (BMI) DOCUMENTED: ICD-10-PCS | Mod: CPTII,S$GLB,, | Performed by: NURSE PRACTITIONER

## 2019-12-10 PROCEDURE — 3008F BODY MASS INDEX DOCD: CPT | Mod: CPTII,S$GLB,, | Performed by: NURSE PRACTITIONER

## 2019-12-10 PROCEDURE — 3078F PR MOST RECENT DIASTOLIC BLOOD PRESSURE < 80 MM HG: ICD-10-PCS | Mod: CPTII,S$GLB,, | Performed by: NURSE PRACTITIONER

## 2019-12-10 PROCEDURE — 99215 PR OFFICE/OUTPT VISIT, EST, LEVL V, 40-54 MIN: ICD-10-PCS | Mod: S$GLB,,, | Performed by: NURSE PRACTITIONER

## 2019-12-10 PROCEDURE — 3075F SYST BP GE 130 - 139MM HG: CPT | Mod: CPTII,S$GLB,, | Performed by: NURSE PRACTITIONER

## 2019-12-10 PROCEDURE — 99499 UNLISTED E&M SERVICE: CPT | Mod: S$GLB,,, | Performed by: NURSE PRACTITIONER

## 2019-12-10 PROCEDURE — 99999 PR PBB SHADOW E&M-EST. PATIENT-LVL III: ICD-10-PCS | Mod: PBBFAC,,, | Performed by: NURSE PRACTITIONER

## 2019-12-10 PROCEDURE — 99499 RISK ADDL DX/OHS AUDIT: ICD-10-PCS | Mod: S$GLB,,, | Performed by: NURSE PRACTITIONER

## 2019-12-10 PROCEDURE — 3075F PR MOST RECENT SYSTOLIC BLOOD PRESS GE 130-139MM HG: ICD-10-PCS | Mod: CPTII,S$GLB,, | Performed by: NURSE PRACTITIONER

## 2019-12-10 PROCEDURE — 99215 OFFICE O/P EST HI 40 MIN: CPT | Mod: S$GLB,,, | Performed by: NURSE PRACTITIONER

## 2019-12-10 PROCEDURE — 99999 PR PBB SHADOW E&M-EST. PATIENT-LVL III: CPT | Mod: PBBFAC,,, | Performed by: NURSE PRACTITIONER

## 2019-12-10 NOTE — ASSESSMENT & PLAN NOTE
BP currently controlled with lifestyle modifications   - encourage to restart exercise  - encourage low salt diet

## 2019-12-10 NOTE — ASSESSMENT & PLAN NOTE
s/p living L kidney transplant 1/14/2019  - followed by transplant team and ID  - On cellcept and profgraf

## 2019-12-10 NOTE — PROGRESS NOTES
Patient, Andra Newell (MRN #[id number]), presented with a recorded BMI of 35.05 kg/m^2 and a documented comorbidity(s):  - Diabetes Mellitus Type 2  - Hypertension  to which the severe obesity is a contributing factor. This is consistent with the definition of severe obesity (BMI 35.0-39.9) with comorbidity (ICD-10 E66.01, Z68.35). The patient's severe obesity was monitored, evaluated, addressed and/or treated. This addendum to the medical record is made on 12/10/2019.

## 2019-12-10 NOTE — ASSESSMENT & PLAN NOTE
Followed by psychiatry, Dr. Sheth  On ativan 1mg nightly for anxiety   - hospitalized once for anxiety and depression 3-4 years ago  - currently exacerbated due to pain  - seeing therapist once weekly for CBT  - requested records from psychiatry

## 2019-12-10 NOTE — ASSESSMENT & PLAN NOTE
Followed by endocrinology  - On  U500 kwikpen 105 units in am and 100 units in PM   - Now has Cody, checking bs 4x daily, no log today. Insulin recently titrated by endo.   - Discussed diet modifications, encourage portion control using plate method.

## 2019-12-10 NOTE — PATIENT INSTRUCTIONS
- Ask transplant team about going back on metformin  - stick with plate method for portion control  - start back with exercise, yoga or aquatic therapy 2x weekly

## 2019-12-11 ENCOUNTER — PATIENT MESSAGE (OUTPATIENT)
Dept: TRANSPLANT | Facility: CLINIC | Age: 59
End: 2019-12-11

## 2019-12-11 ENCOUNTER — PATIENT MESSAGE (OUTPATIENT)
Dept: FAMILY MEDICINE | Facility: CLINIC | Age: 59
End: 2019-12-11

## 2019-12-11 ENCOUNTER — HOSPITAL ENCOUNTER (OUTPATIENT)
Dept: RADIOLOGY | Facility: HOSPITAL | Age: 59
Discharge: HOME | End: 2019-12-11
Attending: NEUROLOGICAL SURGERY
Payer: MEDICARE

## 2019-12-11 ENCOUNTER — PATIENT MESSAGE (OUTPATIENT)
Dept: ENDOCRINOLOGY | Facility: CLINIC | Age: 59
End: 2019-12-11

## 2019-12-11 ENCOUNTER — OFFICE VISIT (OUTPATIENT)
Dept: NEUROSURGERY | Facility: CLINIC | Age: 59
End: 2019-12-11
Payer: MEDICARE

## 2019-12-11 VITALS
DIASTOLIC BLOOD PRESSURE: 76 MMHG | BODY MASS INDEX: 35.16 KG/M2 | HEIGHT: 67 IN | WEIGHT: 224 LBS | HEART RATE: 85 BPM | SYSTOLIC BLOOD PRESSURE: 128 MMHG

## 2019-12-11 DIAGNOSIS — M48.062 LUMBAR STENOSIS WITH NEUROGENIC CLAUDICATION: ICD-10-CM

## 2019-12-11 DIAGNOSIS — Z98.890 S/P LUMBAR LAMINECTOMY: ICD-10-CM

## 2019-12-11 DIAGNOSIS — M51.36 DDD (DEGENERATIVE DISC DISEASE), LUMBAR: Primary | ICD-10-CM

## 2019-12-11 PROCEDURE — 3074F SYST BP LT 130 MM HG: CPT | Mod: CPTII,S$GLB,, | Performed by: NEUROLOGICAL SURGERY

## 2019-12-11 PROCEDURE — 3008F PR BODY MASS INDEX (BMI) DOCUMENTED: ICD-10-PCS | Mod: CPTII,S$GLB,, | Performed by: NEUROLOGICAL SURGERY

## 2019-12-11 PROCEDURE — 99214 PR OFFICE/OUTPT VISIT, EST, LEVL IV, 30-39 MIN: ICD-10-PCS | Mod: S$GLB,,, | Performed by: NEUROLOGICAL SURGERY

## 2019-12-11 PROCEDURE — 99214 OFFICE O/P EST MOD 30 MIN: CPT | Mod: S$GLB,,, | Performed by: NEUROLOGICAL SURGERY

## 2019-12-11 PROCEDURE — 99999 PR PBB SHADOW E&M-EST. PATIENT-LVL III: ICD-10-PCS | Mod: PBBFAC,,, | Performed by: NEUROLOGICAL SURGERY

## 2019-12-11 PROCEDURE — 72110 XR LUMBAR SPINE 5 VIEW WITH FLEX AND EXT: ICD-10-PCS | Mod: 26,,, | Performed by: RADIOLOGY

## 2019-12-11 PROCEDURE — 3008F BODY MASS INDEX DOCD: CPT | Mod: CPTII,S$GLB,, | Performed by: NEUROLOGICAL SURGERY

## 2019-12-11 PROCEDURE — 3074F PR MOST RECENT SYSTOLIC BLOOD PRESSURE < 130 MM HG: ICD-10-PCS | Mod: CPTII,S$GLB,, | Performed by: NEUROLOGICAL SURGERY

## 2019-12-11 PROCEDURE — 72110 X-RAY EXAM L-2 SPINE 4/>VWS: CPT | Mod: 26,,, | Performed by: RADIOLOGY

## 2019-12-11 PROCEDURE — 3078F PR MOST RECENT DIASTOLIC BLOOD PRESSURE < 80 MM HG: ICD-10-PCS | Mod: CPTII,S$GLB,, | Performed by: NEUROLOGICAL SURGERY

## 2019-12-11 PROCEDURE — 99999 PR PBB SHADOW E&M-EST. PATIENT-LVL III: CPT | Mod: PBBFAC,,, | Performed by: NEUROLOGICAL SURGERY

## 2019-12-11 PROCEDURE — 3078F DIAST BP <80 MM HG: CPT | Mod: CPTII,S$GLB,, | Performed by: NEUROLOGICAL SURGERY

## 2019-12-11 PROCEDURE — 72110 X-RAY EXAM L-2 SPINE 4/>VWS: CPT | Mod: TC,FY,PO

## 2019-12-11 NOTE — LETTER
December 19, 2019      Maite Alfonso PA-C  [address] [facility] [address]  [address]  [facility] [address]           [place] [facility]  [address] [place] [address]  Phone: [phone]  Fax: [phone]          Patient: Andra Newell   MR Number: [id number]   YOB: 1960   Date of Visit: 12/11/2019       Dear Maite Alfonso:    Thank you for referring Andra Newell to me for evaluation. Attached you will find relevant portions of my assessment and plan of care.    If you have questions, please do not hesitate to call me. I look forward to following Andra Newell along with you.    Sincerely,    Genaro Chapman MD    Enclosure  CC:  No Recipients    If you would like to receive this communication electronically, please contact [email or web address] or [phone] to request more information on [employer] access.    For providers and/or their staff who would like to refer a patient to [facility], please contact us through our one-stop-shop provider referral line, [facility], at [phone].    If you feel you have received this communication in error or would no longer like to receive these types of communications, please e-mail [email or web address]

## 2019-12-11 NOTE — PROGRESS NOTES
Neurosurgery History & Physical    Patient ID: Andra Newell is a 59 y.o. female.    Chief Complaint   Patient presents with    Lumbar Spine Pain (L-Spine)     LBP onset since Jan 2019. Pain has been worsening and is right of midline from the mid-low back. No symptoms in legs. Sitting/standing worsens. Pain meds helped. Has failed 3 ESIs. PT no relief. H/o 3 laminectomies 10+ years ago. Pain has started since kidney transplant.        History of Present Illness:     Ms. Andra Newell is a very pleasant 59 y.o.  female presenting for evaluation of lumbar back pain with radiation to bilateral hips. She reports an extensive history of lumbar back pain that has progressively gotten worse since January 2019.  She reports 2 different types of back pain today.  First is in the right flank which she reports as sharp stabbing pain. The second is lumbar back pain that is and that the grabbing pain with walking.  She is unable to sit or stand for any significant length of time.  She does have left leg cramping. She denies numbness or tingling but endorses left whole leg weakness. She has an extensive history of surgeries in the left lower extremity which include Achilles rupture repair and a subsequent MRSA washout postoperatively.  She also had two total knee arthroplasties on the left knee.  She has undergone 3 laminectomies, two with Dr. Hall and the last was >10 years ago with Dr. Castillo.  She does report significant improvement after her surgeries however she does report her pain was back pain and not leg pain.  She has seen pain management and underwent RFA of the right L2,3,4,5 medial branch nerves on 10/20/19 and Right L2,3,4,5 medial branch nerve blocks on 10/16 and 9/25 without relief of symptoms. She is discouraged given her continued pain and is not interested in returning to pain management. She has done multiple rounds of PT at [facility], but none recent. She is currently doing well with water  aerobics and yoga. She is also s/p renal transplant 1/14/2019. She has had difficulty with weight fluctuations. She sees endocrinology for uncontrolled diabetes.     Review of Systems   Constitutional: Negative for activity change, chills, fever and unexpected weight change.   HENT: Negative for hearing loss, tinnitus, trouble swallowing and voice change.    Eyes: Negative.  Negative for visual disturbance.   Respiratory: Negative for apnea, chest tightness and shortness of breath.    Cardiovascular: Negative.  Negative for chest pain and palpitations.   Gastrointestinal: Negative.  Negative for abdominal pain, constipation, diarrhea, nausea and vomiting.   Genitourinary: Positive for flank pain. Negative for difficulty urinating, dysuria and frequency.   Musculoskeletal: Positive for back pain and gait problem. Negative for myalgias, neck pain and neck stiffness.   Skin: Negative for wound.   Allergic/Immunologic: Negative for immunocompromised state.   Neurological: Negative for dizziness, tremors, seizures, facial asymmetry, speech difficulty, weakness, light-headedness, numbness and headaches.   Hematological: Negative for adenopathy. Does not bruise/bleed easily.   Psychiatric/Behavioral: Negative for confusion, decreased concentration, dysphoric mood and sleep disturbance.       Past Medical History:   Diagnosis Date    Anemia     Anemia in chronic kidney disease, on chronic dialysis 7/12/2017    Anxiety and depression     Aplastic anemia with parvovirus B19 infection 5/27/2019    Arthritis     Asthma     allergic airway    CKD stage III (moderate) 2/18/2019    Colon polyp     Depression     Diabetes mellitus     Diverticulosis     Eosinophilia     ESRD (end stage renal disease)     stage V, due for living donor 9/2018    ESRD on dialysis     GERD (gastroesophageal reflux disease)     Gout     Gout     Hyperlipidemia     Hypertension     Hypertriglyceridemia without hypercholesterolemia  6/9/2019    Low back pain     Low HDL (under 40) 6/9/2019    MRSA carrier     Neutropenia, unspecified 5/8/2019    Obesity     Renal disorder     Rotator cuff syndrome--left     Thyroid disease     Ulnar neuropathy of right upper extremity     Uncontrolled type 2 diabetes mellitus with hyperglycemia      Social History     Socioeconomic History    Marital status: Significant Other     Spouse name: Any Alvarez    Number of children: 2    Years of education: Not on file    Highest education level: 12th grade   Occupational History    Occupation: retired     Comment:    Social Needs    Financial resource strain: Not hard at all    Food insecurity:     Worry: Never true     Inability: Never true    Transportation needs:     Medical: No     Non-medical: No   Tobacco Use    Smoking status: Never Smoker    Smokeless tobacco: Never Used   Substance and Sexual Activity    Alcohol use: No    Drug use: No    Sexual activity: Not Currently   Lifestyle    Physical activity:     Days per week: 0 days     Minutes per session: 0 min    Stress: Very much   Relationships    Social connections:     Talks on phone: Three times a week     Gets together: Once a week     Attends [employer] service: 1 to 4 times per year     Active member of club or organization: No     Attends meetings of clubs or organizations: Never     Relationship status: Living with partner   Other Topics Concern    Not on file   Social History Narrative     female    Disabled since 2015 due to DDD and severe osteoarthritis of knee and hips    Previously worked as  at [employer]    Lives in residential 1 story home with partner, Any.     Has 2 boys, 1 lives in [place], 1 in [place].         Jainism: Mu-ism    Hobbies: painting and crafts.       Family History   Problem Relation Age of Onset    Diabetes Mother     Alzheimer's disease Mother     Thyroid disease Mother      Hyperlipidemia Mother     Heart disease Father     Stroke Father     Diabetes Sister     Lupus Sister     Ovarian cancer Sister     Heart disease Brother     No Known Problems Son     Diabetes Maternal Grandmother     Hypertension Maternal Grandmother     No Known Problems Paternal Grandmother     No Known Problems Paternal Grandfather     COPD Maternal Aunt     Diabetes Maternal Aunt     Heart disease Maternal Aunt     Hypertension Maternal Aunt     No Known Problems Maternal Uncle     No Known Problems Paternal Aunt     No Known Problems Paternal Uncle     Colon cancer Neg Hx     Colon polyps Neg Hx     Crohn's disease Neg Hx     Ulcerative colitis Neg Hx     Celiac disease Neg Hx      Review of patient's allergies indicates:   Allergen Reactions    Morphine Itching    Torsemide Diarrhea     Diarrhea stopped once discontinued med    Codeine Itching     Can take oxycodone and hydrocodone with Benadryl       Current Outpatient Medications:     blood sugar diagnostic (BLOOD GLUCOSE TEST) Strp, Checks her bg 4 times a day   e11.9, Disp: 400 strip, Rfl: 4    famotidine (PEPCID) 20 MG tablet, Take 1 tablet (20 mg total) by mouth 2 (two) times daily., Disp: 60 tablet, Rfl: 11    insulin regular hum U-500 conc (HUMULIN R U-500, CONC, KWIKPEN) 500 unit/mL (3 mL) InPn, 100 units breakfast and dinner, Disp: 3 Syringe, Rfl: 12    levothyroxine (SYNTHROID) 75 MCG tablet, Take 1 tablet (75 mcg total) by mouth once daily., Disp: 90 tablet, Rfl: 3    LORazepam (ATIVAN) 1 MG tablet, Take 1 mg by mouth every evening., Disp: , Rfl:     magnesium oxide 500 mg Tab, Take 500 mg by mouth once daily., Disp: , Rfl: 0    multivitamin (THERAGRAN) tablet, Take 1 tablet by mouth once daily., Disp: , Rfl:     mycophenolate (CELLCEPT) 250 mg Cap, Take 2 capsules (500 mg total) by mouth 2 (two) times daily., Disp: 120 capsule, Rfl: 11    rosuvastatin (CRESTOR) 10 MG tablet, Take 1 tablet (10 mg total) by mouth  "every evening., Disp: 90 tablet, Rfl: 3    sulfamethoxazole-trimethoprim 400-80mg (BACTRIM,SEPTRA) 400-80 mg per tablet, Take 1 tablet by mouth once daily., Disp: 30 tablet, Rfl: 6    tacrolimus (PROGRAF) 0.5 MG Cap, Take 1 capsule (0.5 mg total) by mouth every evening. Total 2mg in AM PO and 1.5mg PO in PM Z94.0, Disp: 30 capsule, Rfl: 11    tacrolimus (PROGRAF) 1 MG Cap, Take 2 capsules (2 mg total) by mouth every morning AND 1 capsule (1 mg total) every evening. Total 2mg in AM PO and 1.5mg in PM PO. Z94.0., Disp: 90 capsule, Rfl: 11    vilazodone (VIIBRYD) 40 mg Tab tablet, Take 1 tablet (40 mg total) by mouth once daily., Disp: 30 tablet, Rfl: 4    metFORMIN (GLUCOPHAGE-XR) 500 MG 24 hr tablet, Take 1 tablet (500 mg total) by mouth 2 (two) times daily with meals., Disp: 180 tablet, Rfl: 3    Current Facility-Administered Medications:     sodium chloride 0.9% bolus 2,000 mL, 2,000 mL, Intravenous, Once, Maureen Cabral MD    Vitals:    12/11/19 0859   BP: 128/76   Pulse: 85   Weight: 101.6 kg (223 lb 15.8 oz)   Height: 5' 7" (1.702 m)   PainSc:   8     Body mass index is 35.08 kg/m².        Neurologic Exam     Mental Status   Oriented to person, place, and time.   Follows 3 step commands.   Attention: normal. Concentration: normal.   Speech: speech is normal   Level of consciousness: alert  Knowledge: good.   Able to name object.     Cranial Nerves     CN II   Visual fields full to confrontation.   Visual acuity: normal  Right visual field deficit: none  Left visual field deficit: none     CN III, IV, VI   Pupils are equal, round, and reactive to light.  Extraocular motions are normal.   CN III: no CN III palsy  CN VI: no CN VI palsy    CN V   Facial sensation intact.   Right facial sensation deficit: none  Left facial sensation deficit: none    CN VII   Facial expression full, symmetric.   Right facial weakness: none  Left facial weakness: none    CN VIII   CN VIII normal.   Hearing: intact    CN " IX, X   CN IX normal.   CN X normal.     CN XI   CN XI normal.     CN XII   CN XII normal.     Motor Exam   Muscle bulk: normal  Overall muscle tone: normal  Right arm tone: normal  Left arm tone: normal  Right arm pronator drift: absent  Left arm pronator drift: absent  Right leg tone: normal  Left leg tone: normal    Strength   Strength 5/5 throughout.   Right neck flexion: 5/5  Left neck flexion: 5/5  Right neck extension: 5/5  Left neck extension: 5/5  Right deltoid: 5/5  Left deltoid: 5/5  Right biceps: 5/5  Left biceps: 5/5  Right triceps: 5/5  Left triceps: 5/5  Right wrist flexion: 5/5  Left wrist flexion: 5/5  Right wrist extension: 5/5  Left wrist extension: 5/5  Right interossei: 5/5  Left interossei: 5/5  Right abdominals: 5/5  Left abdominals: 5/5  Right iliopsoas: 5/5  Left iliopsoas: 5/5  Right quadriceps: 5/5  Left quadriceps: 5/5  Right hamstrin/5  Left hamstrin/5  Right glutei: 5/5  Left glutei: 5/5  Right anterior tibial: 5/5  Left anterior tibial: 5/5  Right posterior tibial: 5/5  Left posterior tibial: 5/5  Right peroneal: 5/5  Left peroneal: 5/5  Right gastroc: 5/5  Left gastroc: 5/5    Sensory Exam   Light touch normal.   Right arm light touch: normal  Left arm light touch: normal  Right leg light touch: normal  Left leg light touch: normal  Vibration normal.     Gait, Coordination, and Reflexes     Gait  Gait: normal    Coordination   Romberg: negative  Finger to nose coordination: normal  Heel to shin coordination: normal  Tandem walking coordination: normal    Tremor   Resting tremor: absent  Intention tremor: absent  Action tremor: absent    Reflexes   Right brachioradialis: 2+  Left brachioradialis: 2+  Right biceps: 2+  Left biceps: 2+  Right triceps: 2+  Left triceps: 2+  Right patellar: 2+  Left patellar: 2+  Right achilles: 2+  Left achilles: 2+  Right : 2+  Left : 2+  Right plantar: normal  Left plantar: normal  Right Guzman: absent  Left Guzman: absent  Right ankle  clonus: absent  Left ankle clonus: absent      Physical Exam   Constitutional: She is oriented to person, place, and time. Vital signs are normal. She appears well-developed and well-nourished.   HENT:   Head: Normocephalic and atraumatic.   Right Ear: Hearing normal.   Left Ear: Hearing normal.   Nose: Nose normal.   Mouth/Throat: Uvula is midline.   Eyes: Pupils are equal, round, and reactive to light. Conjunctivae, EOM and lids are normal.   Neck: Trachea normal, normal range of motion, full passive range of motion without pain and phonation normal. Neck supple.   Cardiovascular: Normal rate, regular rhythm, intact distal pulses and normal pulses.   Pulmonary/Chest: Effort normal and breath sounds normal.   Abdominal: Soft. Normal appearance.   Musculoskeletal: Normal range of motion.   Feet:   Right Foot:   Protective Sensation: 4 sites tested. 4 sites sensed.   Skin Integrity: Negative for ulcer.   Left Foot:   Protective Sensation: 4 sites tested. 4 sites sensed.   Skin Integrity: Negative for ulcer.   Neurological: She is alert and oriented to person, place, and time. She has normal strength and normal reflexes. No cranial nerve deficit or sensory deficit. She has a normal Finger-Nose-Finger Test, a normal Heel to Shin Test, a normal Romberg Test and a normal Tandem Gait Test. She displays a negative Romberg sign. Gait normal.   Reflex Scores:       Tricep reflexes are 2+ on the right side and 2+ on the left side.       Bicep reflexes are 2+ on the right side and 2+ on the left side.       Brachioradialis reflexes are 2+ on the right side and 2+ on the left side.       Patellar reflexes are 2+ on the right side and 2+ on the left side.       Achilles reflexes are 2+ on the right side and 2+ on the left side.  Skin: Skin is warm, dry and intact. Capillary refill takes less than 2 seconds.   Psychiatric: She has a normal mood and affect. Her speech is normal and behavior is normal. Judgment and thought content  normal. Cognition and memory are normal.   Nursing note and vitals reviewed.    On physical examination she walks with an antalgic gait and varus deformity of the left knee.There is + sagittal balance with ambulation.  She has mild plantar flexion weakness but otherwise her strength is maintained.  Reflexes are normal in sensation is intact.    Oswestry: 60%  PHQ: 8    Imaging:   MRI of the lumbar spine dated 04/04/2019 independently reviewed and discussed with the patient.  There is a prior laminectomy.  There is multilevel spondylosis with collapse of the disc space at L3-4 L4-5.  At L1 to there is a broad disc herniation causing mild bilateral foraminal stenosis.  At L2-3 there is again a broad disc herniation causing right worse than left foraminal stenosis no central stenosis.  At L3-4 there is previous laminectomy on the right.  There is a significant foraminal stenosis on the left, no central stenosis.  At L4-5 there is bilateral foraminal stenosis post laminectomy on the left with no evidence of central stenosis.  At L5-S1 there is a broad disc herniation causing left worse than right foraminal stenosis there is severe facet arthropathy there may have been a laminectomy at the right L5.     Assessment & Plan:   Andra was seen today for lumbar spine pain (l-spine).    Diagnoses and all orders for this visit:    DDD (degenerative disc disease), lumbar  -     Ambulatory Referral to Pain Clinic    S/P lumbar laminectomy      We have extensively reviewed the imaging in history with the patient.  At this time, we recommend continued conservative management given that she has no neurologic deficit and surgical intervention may not help her axial back pain.  She is not really experiencing radiculopathy, but rather pain in the left leg secondary to complications from her knee and Achilles surgery.  She is interested in meeting a new pain management physician, we will refer her as well as discuss physical therapy.   She will follow up with us if no improvement.

## 2019-12-12 DIAGNOSIS — E11.69 MIXED DIABETIC HYPERLIPIDEMIA ASSOCIATED WITH TYPE 2 DIABETES MELLITUS: Primary | ICD-10-CM

## 2019-12-12 DIAGNOSIS — E78.2 MIXED DIABETIC HYPERLIPIDEMIA ASSOCIATED WITH TYPE 2 DIABETES MELLITUS: Primary | ICD-10-CM

## 2019-12-12 RX ORDER — METFORMIN HYDROCHLORIDE 500 MG/1
500 TABLET, EXTENDED RELEASE ORAL 2 TIMES DAILY WITH MEALS
Qty: 180 TABLET | Refills: 3 | Status: SHIPPED | OUTPATIENT
Start: 2019-12-12 | End: 2020-08-31

## 2019-12-14 ENCOUNTER — PATIENT MESSAGE (OUTPATIENT)
Dept: ENDOCRINOLOGY | Facility: CLINIC | Age: 59
End: 2019-12-14

## 2019-12-19 ENCOUNTER — RESEARCH ENCOUNTER (OUTPATIENT)
Dept: RESEARCH | Facility: HOSPITAL | Age: 59
End: 2019-12-19
Payer: MEDICARE

## 2019-12-19 ENCOUNTER — LAB VISIT (OUTPATIENT)
Dept: LAB | Facility: HOSPITAL | Age: 59
End: 2019-12-19
Attending: INTERNAL MEDICINE
Payer: MEDICARE

## 2019-12-19 VITALS
BODY MASS INDEX: 35.22 KG/M2 | SYSTOLIC BLOOD PRESSURE: 135 MMHG | WEIGHT: 224.88 LBS | DIASTOLIC BLOOD PRESSURE: 75 MMHG | RESPIRATION RATE: 18 BRPM | HEART RATE: 78 BPM | TEMPERATURE: 98 F

## 2019-12-19 DIAGNOSIS — Z94.0 KIDNEY REPLACED BY TRANSPLANT: ICD-10-CM

## 2019-12-19 DIAGNOSIS — Z20.828 PARVOVIRUS EXPOSURE: ICD-10-CM

## 2019-12-19 DIAGNOSIS — Z00.6 RESEARCH STUDY PATIENT: ICD-10-CM

## 2019-12-19 LAB
ALBUMIN SERPL BCP-MCNC: 4.2 G/DL (ref 3.5–5.2)
ANION GAP SERPL CALC-SCNC: 9 MMOL/L (ref 8–16)
BASOPHILS # BLD AUTO: 0.04 K/UL (ref 0–0.2)
BASOPHILS NFR BLD: 0.4 % (ref 0–1.9)
BUN SERPL-MCNC: 25 MG/DL (ref 8–23)
CALCIUM SERPL-MCNC: 9.7 MG/DL (ref 8.7–10.5)
CHLORIDE SERPL-SCNC: 107 MMOL/L (ref 95–110)
CO2 SERPL-SCNC: 25 MMOL/L (ref 23–29)
CREAT SERPL-MCNC: 1.1 MG/DL (ref 0.5–1.4)
DIFFERENTIAL METHOD: ABNORMAL
EOSINOPHIL # BLD AUTO: 0.5 K/UL (ref 0–0.5)
EOSINOPHIL NFR BLD: 5.5 % (ref 0–8)
ERYTHROCYTE [DISTWIDTH] IN BLOOD BY AUTOMATED COUNT: 12.9 % (ref 11.5–14.5)
EST. GFR  (AFRICAN AMERICAN): >60 ML/MIN/1.73 M^2
EST. GFR  (NON AFRICAN AMERICAN): 54.3 ML/MIN/1.73 M^2
GLUCOSE SERPL-MCNC: 150 MG/DL (ref 70–110)
HCT VFR BLD AUTO: 38.7 % (ref 37–48.5)
HGB BLD-MCNC: 12.2 G/DL (ref 12–16)
IMM GRANULOCYTES # BLD AUTO: 0.05 K/UL (ref 0–0.04)
IMM GRANULOCYTES NFR BLD AUTO: 0.6 % (ref 0–0.5)
LYMPHOCYTES # BLD AUTO: 0.9 K/UL (ref 1–4.8)
LYMPHOCYTES NFR BLD: 10 % (ref 18–48)
MAGNESIUM SERPL-MCNC: 1.5 MG/DL (ref 1.6–2.6)
MCH RBC QN AUTO: 30 PG (ref 27–31)
MCHC RBC AUTO-ENTMCNC: 31.5 G/DL (ref 32–36)
MCV RBC AUTO: 95 FL (ref 82–98)
MONOCYTES # BLD AUTO: 0.6 K/UL (ref 0.3–1)
MONOCYTES NFR BLD: 6.2 % (ref 4–15)
NEUTROPHILS # BLD AUTO: 7 K/UL (ref 1.8–7.7)
NEUTROPHILS NFR BLD: 77.3 % (ref 38–73)
NRBC BLD-RTO: 0 /100 WBC
PHOSPHATE SERPL-MCNC: 4 MG/DL (ref 2.7–4.5)
PLATELET # BLD AUTO: 222 K/UL (ref 150–350)
PMV BLD AUTO: 9.9 FL (ref 9.2–12.9)
POTASSIUM SERPL-SCNC: 5 MMOL/L (ref 3.5–5.1)
RBC # BLD AUTO: 4.06 M/UL (ref 4–5.4)
SODIUM SERPL-SCNC: 141 MMOL/L (ref 136–145)
TACROLIMUS BLD-MCNC: 6.7 NG/ML (ref 5–15)
WBC # BLD AUTO: 9.06 K/UL (ref 3.9–12.7)

## 2019-12-19 PROCEDURE — 85025 COMPLETE CBC W/AUTO DIFF WBC: CPT

## 2019-12-19 PROCEDURE — 87799 DETECT AGENT NOS DNA QUANT: CPT | Mod: 91

## 2019-12-19 PROCEDURE — 80197 ASSAY OF TACROLIMUS: CPT

## 2019-12-19 PROCEDURE — 83735 ASSAY OF MAGNESIUM: CPT

## 2019-12-19 PROCEDURE — 80069 RENAL FUNCTION PANEL: CPT

## 2019-12-19 PROCEDURE — 87799 DETECT AGENT NOS DNA QUANT: CPT

## 2019-12-19 NOTE — PROGRESS NOTES
[facility] CMV Prevention (Kidney Transplant)  Protocol: [id number]  IRB# 2017.512  PI: Nicholas GUAN)  Site: [id number]     Subject #[id number]     Visit 18 / Week 48     Date of Visit: 19Dec2019     Patient arrived at the clinic to complete her V18/WK48 visit.  Protocol labs were drawn at 0940.  Specimens were processed and shipped as per protocol. , Evette Yoder, conducted the visit in its entirety with Sharri Sands present. Patient agrees to continue his participation in the study and all questions answered.  Patient reports concomitant medications change of Metformin.  No new AE's/SAEs or changes in his duarte status from last visit.  This was added to source and EDC.  Epic was reviewed prior to patient's visit. No hospitalizations, recent fever, diarrhea or cold like symptoms.     Vitals:    12/19/19 0948   BP: 135/75   BP Location: Right arm   Patient Position: Sitting   Pulse: 78   Resp: 18   Temp: 98.2 °F (36.8 °C)   TempSrc: Oral   Weight: 102 kg (224 lb 13.9 oz)       Current Outpatient Medications:     blood sugar diagnostic (BLOOD GLUCOSE TEST) Strp, Checks her bg 4 times a day   e11.9, Disp: 400 strip, Rfl: 4    famotidine (PEPCID) 20 MG tablet, Take 1 tablet (20 mg total) by mouth 2 (two) times daily., Disp: 60 tablet, Rfl: 11    insulin regular hum U-500 conc (HUMULIN R U-500, CONC, KWIKPEN) 500 unit/mL (3 mL) InPn, 100 units breakfast and dinner, Disp: 3 Syringe, Rfl: 12    levothyroxine (SYNTHROID) 75 MCG tablet, Take 1 tablet (75 mcg total) by mouth once daily., Disp: 90 tablet, Rfl: 3    LORazepam (ATIVAN) 1 MG tablet, Take 1 mg by mouth every evening., Disp: , Rfl:     magnesium oxide 500 mg Tab, Take 500 mg by mouth once daily., Disp: , Rfl: 0    metFORMIN (GLUCOPHAGE-XR) 500 MG 24 hr tablet, Take 1 tablet (500 mg total) by mouth 2 (two) times daily with meals., Disp: 180 tablet, Rfl: 3    multivitamin (THERAGRAN) tablet, Take 1 tablet by mouth once daily., Disp: , Rfl:      mycophenolate (CELLCEPT) 250 mg Cap, Take 2 capsules (500 mg total) by mouth 2 (two) times daily., Disp: 120 capsule, Rfl: 11    rosuvastatin (CRESTOR) 10 MG tablet, Take 1 tablet (10 mg total) by mouth every evening., Disp: 90 tablet, Rfl: 3    sulfamethoxazole-trimethoprim 400-80mg (BACTRIM,SEPTRA) 400-80 mg per tablet, Take 1 tablet by mouth once daily., Disp: 30 tablet, Rfl: 6    tacrolimus (PROGRAF) 0.5 MG Cap, Take 1 capsule (0.5 mg total) by mouth every evening. Total 2mg in AM PO and 1.5mg PO in PM Z94.0, Disp: 30 capsule, Rfl: 11    tacrolimus (PROGRAF) 1 MG Cap, Take 2 capsules (2 mg total) by mouth every morning AND 1 capsule (1 mg total) every evening. Total 2mg in AM PO and 1.5mg in PM PO. Z94.0., Disp: 90 capsule, Rfl: 11    vilazodone (VIIBRYD) 40 mg Tab tablet, Take 1 tablet (40 mg total) by mouth once daily., Disp: 30 tablet, Rfl: 4    CMV Disease Assessment, Health Outcomes Assessment, and Renal Transplant Outcomes sections were completed.       Study participant was reminded to call site/ for any questions.  Next appointment on 23Jan2020.

## 2019-12-20 ENCOUNTER — TELEPHONE (OUTPATIENT)
Dept: TRANSPLANT | Facility: CLINIC | Age: 59
End: 2019-12-20

## 2019-12-20 NOTE — TELEPHONE ENCOUNTER
----- Message from Maureen Cabral MD sent at 12/19/2019  5:28 PM CST -----  Result reviewed and message sent to patient via [employer]:  Your results are acceptable and require no action.

## 2019-12-23 ENCOUNTER — CLINICAL SUPPORT (OUTPATIENT)
Dept: ENDOCRINOLOGY | Facility: CLINIC | Age: 59
End: 2019-12-23
Payer: MEDICARE

## 2019-12-24 LAB — B19V DNA # SPEC NAA+PROBE: <100 COPIES/ML

## 2019-12-30 ENCOUNTER — PATIENT MESSAGE (OUTPATIENT)
Dept: TRANSPLANT | Facility: CLINIC | Age: 59
End: 2019-12-30

## 2019-12-30 ENCOUNTER — TELEPHONE (OUTPATIENT)
Dept: PAIN MEDICINE | Facility: CLINIC | Age: 59
End: 2019-12-30

## 2020-01-02 ENCOUNTER — PATIENT OUTREACH (OUTPATIENT)
Dept: ADMINISTRATIVE | Facility: HOSPITAL | Age: 60
End: 2020-01-02

## 2020-01-02 ENCOUNTER — PATIENT MESSAGE (OUTPATIENT)
Dept: PAIN MEDICINE | Facility: CLINIC | Age: 60
End: 2020-01-02

## 2020-01-03 ENCOUNTER — PATIENT MESSAGE (OUTPATIENT)
Dept: ENDOCRINOLOGY | Facility: CLINIC | Age: 60
End: 2020-01-03

## 2020-01-06 ENCOUNTER — PATIENT MESSAGE (OUTPATIENT)
Dept: TRANSPLANT | Facility: CLINIC | Age: 60
End: 2020-01-06

## 2020-01-06 ENCOUNTER — OFFICE VISIT (OUTPATIENT)
Dept: TRANSPLANT | Facility: CLINIC | Age: 60
End: 2020-01-06
Payer: MEDICARE

## 2020-01-06 VITALS
SYSTOLIC BLOOD PRESSURE: 125 MMHG | HEART RATE: 77 BPM | DIASTOLIC BLOOD PRESSURE: 70 MMHG | WEIGHT: 219.56 LBS | BODY MASS INDEX: 34.46 KG/M2 | OXYGEN SATURATION: 98 % | TEMPERATURE: 98 F | RESPIRATION RATE: 18 BRPM | HEIGHT: 67 IN

## 2020-01-06 DIAGNOSIS — Z94.0 KIDNEY REPLACED BY TRANSPLANT: ICD-10-CM

## 2020-01-06 DIAGNOSIS — I12.9 RENAL HYPERTENSION: ICD-10-CM

## 2020-01-06 DIAGNOSIS — E83.42 HYPOMAGNESEMIA: ICD-10-CM

## 2020-01-06 DIAGNOSIS — Z79.60 LONG-TERM USE OF IMMUNOSUPPRESSANT MEDICATION: ICD-10-CM

## 2020-01-06 DIAGNOSIS — R19.7 DIARRHEA, UNSPECIFIED TYPE: Primary | ICD-10-CM

## 2020-01-06 DIAGNOSIS — Z94.0 STATUS POST LIVING-DONOR KIDNEY TRANSPLANTATION: ICD-10-CM

## 2020-01-06 DIAGNOSIS — B34.3 PARVOVIRUS B19 INFECTION: ICD-10-CM

## 2020-01-06 PROCEDURE — 3074F SYST BP LT 130 MM HG: CPT | Mod: CPTII,S$GLB,, | Performed by: INTERNAL MEDICINE

## 2020-01-06 PROCEDURE — 99999 PR PBB SHADOW E&M-EST. PATIENT-LVL III: ICD-10-PCS | Mod: PBBFAC,,, | Performed by: INTERNAL MEDICINE

## 2020-01-06 PROCEDURE — 99215 PR OFFICE/OUTPT VISIT, EST, LEVL V, 40-54 MIN: ICD-10-PCS | Mod: GC,S$GLB,, | Performed by: INTERNAL MEDICINE

## 2020-01-06 PROCEDURE — 3078F DIAST BP <80 MM HG: CPT | Mod: CPTII,S$GLB,, | Performed by: INTERNAL MEDICINE

## 2020-01-06 PROCEDURE — 3074F PR MOST RECENT SYSTOLIC BLOOD PRESSURE < 130 MM HG: ICD-10-PCS | Mod: CPTII,S$GLB,, | Performed by: INTERNAL MEDICINE

## 2020-01-06 PROCEDURE — 3008F BODY MASS INDEX DOCD: CPT | Mod: CPTII,S$GLB,, | Performed by: INTERNAL MEDICINE

## 2020-01-06 PROCEDURE — 99215 OFFICE O/P EST HI 40 MIN: CPT | Mod: GC,S$GLB,, | Performed by: INTERNAL MEDICINE

## 2020-01-06 PROCEDURE — 99999 PR PBB SHADOW E&M-EST. PATIENT-LVL III: CPT | Mod: PBBFAC,,, | Performed by: INTERNAL MEDICINE

## 2020-01-06 PROCEDURE — 3078F PR MOST RECENT DIASTOLIC BLOOD PRESSURE < 80 MM HG: ICD-10-PCS | Mod: CPTII,S$GLB,, | Performed by: INTERNAL MEDICINE

## 2020-01-06 PROCEDURE — 3008F PR BODY MASS INDEX (BMI) DOCUMENTED: ICD-10-PCS | Mod: CPTII,S$GLB,, | Performed by: INTERNAL MEDICINE

## 2020-01-06 RX ORDER — TACROLIMUS 1 MG/1
CAPSULE ORAL
Qty: 90 CAPSULE | Refills: 11 | Status: SHIPPED | OUTPATIENT
Start: 2020-01-06 | End: 2020-01-29 | Stop reason: SDUPTHER

## 2020-01-06 RX ORDER — TACROLIMUS 0.5 MG/1
0.5 CAPSULE ORAL NIGHTLY
Qty: 30 CAPSULE | Refills: 11 | Status: SHIPPED | OUTPATIENT
Start: 2020-01-06 | End: 2020-01-29 | Stop reason: SDUPTHER

## 2020-01-06 RX ORDER — HYDROCODONE BITARTRATE AND ACETAMINOPHEN 7.5; 325 MG/1; MG/1
1 TABLET ORAL EVERY 6 HOURS PRN
COMMUNITY
End: 2021-05-13 | Stop reason: ALTCHOICE

## 2020-01-06 RX ORDER — MYCOPHENOLIC ACID 180 MG/1
360 TABLET, DELAYED RELEASE ORAL 2 TIMES DAILY
Qty: 120 TABLET | Refills: 11 | Status: SHIPPED | OUTPATIENT
Start: 2020-01-06 | End: 2020-01-07 | Stop reason: SDUPTHER

## 2020-01-06 NOTE — PROGRESS NOTES
Andra Newell was discussed  with Dr. Solis as outlined.  I have personally seen,  interviewed and evaluated her, reviewed the information in this note, and agree with the findings listed in the attached encounter.   1. Diarrhea, unspecified type  Clostridium difficile EIA    Stool culture    Giardia / Cryptosporidum, EIA    Stool Exam-Ova,Cysts,Parasites    WBC, Stool   2. Kidney replaced by transplant  tacrolimus (PROGRAF) 0.5 MG Cap    tacrolimus (PROGRAF) 1 MG Cap   3. Status post living-donor kidney transplantation  Doing well   4. Parvovirus B19 infection  Repeat PCR every 2 months   5. Long-term use of immunosuppressant medication     6. Renal hypertension     7. Hypomagnesemia        We discussed her diarrhea is likely related to CellCept.  Options were reviewed: Take medication q.i.d., or try Myfortic.  We will check stool studies to ensure this is not infectious.  If there is no evidence of infection, she is safe to use Imodium.     She will continue to follow up with Endocrine, pain management is in your GYN for her diabetes, back pain, recurrent UTIs.  Additionally, we will continue to check labs every other months given she had parvovirus infection.  The last parvovirus was less than detectable range for the quantitative PCR.  I would like to confirm with another reading or 2.     immunosuppression refill for the next year:  Tacrolimus at current dose, Myfortic instead of CellCept.    Maureen Cabral MD  [facility]

## 2020-01-06 NOTE — PROGRESS NOTES
Kidney Post-Transplant Assessment    Referring Physician: Dalton Heaton  Current Nephrologist: Dalton Heaton    ORGAN: LEFT KIDNEY  Donor Type: living  PHS Increased Risk: no  Cold Ischemia: 37 mins  Induction Medications: campath - alemtuzumab (anti-cd52)    Subjective:     CC:  Reassessment of renal allograft function and management of chronic immunosuppression.    HPI:  Ms. Newell is a 59 y.o. year old White female who received a living kidney transplant on 1/14/19. Her most recent creatinine is 1.1. She takes mycophenolate mofetil (held d/t recurrent ESBL UTIs Jan-Feb 2019) and tacrolimus for maintenance immunosuppression. Her post transplant course has been uncomplicated to date.    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 She briefly required HD 4/13/18 until ~Sept 2018.  she was predialysis at the time of transplant.   -LURD KT (friend) 01/14/2019,  Induced with Campath.     -Recurrent K pneumoniae ESBL 2019 UTI 1/30, 2/11, and  2/25  -Admit for unsteadiness and dizziness with work up WNL, MRI shows degenerative changes  -admitted 05/07/2019 with fever infectious workup negative for bacteria.  Refractory anemia noted, and it was found a parvovirus B19 from 4/15/19 causing aplastic anemia was positive, s/p IVIG 5/8 and 5/9  -colonoscopy May 2019 for diarrhea unrevealing.    PCP PROPHYLAXIS: Until 1/14/19; Atovaquone; Bactrim stopped 01/24/2019 D/T hiK  CMV PROPHYLAXIS: CMV  Mismatch -study participant--study participation terminated May 2019 when pancytopenia noted, and it became clear she would require longer-term withholding of antiviral medication than was allowed in the research study. Rx until 7/14/19  FUNGAL PROPHYLAXIS: None    UPDATE  Pt has been doing generally well since her last visit to [facility] clinic in Sept. She reports ongoing back pain present since her transplant that is now wrapping around to the R side of her  "abdomen. She is seeing a new pain management doctor on 1/13 and is considering an EMG study. She denies f/c, dysuria, hematuria, or urinary frequency. She does reports some diarrhea which she attributes to the Cellcept. She states it is alleviated by imodium taken a few times a week. Pt also mentions acid reflux and has been unable to refill her Pepcid at her pharmacy due to a medication shortage. She reports good hydration and PO intake. She denies nausea/vomiting, LE edema, SOB, palpitations, CP.    Review of Systems   Constitutional: Negative for fever.   Eyes: Negative for visual disturbance.   Respiratory: Negative for shortness of breath.    Cardiovascular: Negative for chest pain and leg swelling.   Gastrointestinal: Negative.    Genitourinary: Negative for difficulty urinating.   Musculoskeletal: Positive for back pain.   Skin: Negative for rash.   Allergic/Immunologic: Positive for immunocompromised state.   Neurological: Negative for tremors.     Objective: /70 (BP Location: Right arm, Patient Position: Sitting, BP Method: Medium (Automatic))   Pulse 77   Temp 98.3 °F (36.8 °C) (Oral)   Resp 18   Ht 5' 7" (1.702 m)   Wt 99.6 kg (219 lb 9.3 oz)   LMP 02/28/2012   SpO2 98%   BMI 34.39 kg/m²       Physical Exam   Constitutional: She is oriented to person, place, and time. No distress.   Cardiovascular: Normal rate and regular rhythm.   Pulmonary/Chest: Effort normal and breath sounds normal. No respiratory distress.   Abdominal: Soft. She exhibits no mass. There is no tenderness.   Musculoskeletal: She exhibits no edema.   Neurological: She is alert and oriented to person, place, and time.   Skin: Skin is warm and dry.   Psychiatric: She has a normal mood and affect.     Labs:  Lab Results   Component Value Date    WBC 9.06 12/19/2019    HGB 12.2 12/19/2019    HCT 38.7 12/19/2019     12/19/2019    K 5.0 12/19/2019     12/19/2019    CO2 25 12/19/2019    BUN 25 (H) 12/19/2019    " CREATININE 1.1 12/19/2019    EGFRNONAA 54.3 (A) 12/19/2019    CALCIUM 9.7 12/19/2019    PHOS 4.0 12/19/2019    MG 1.5 (L) 12/19/2019    ALBUMIN 4.2 12/19/2019    AST 24 10/14/2019    ALT 27 10/14/2019    UTPCR Unable to calculate 12/19/2019    PTH 83.0 (H) 11/21/2019    TACROLIMUS 6.7 12/19/2019     Lab Results   Component Value Date    EXTANC 1,240 04/15/2019    EXTWBC 2.0 (L) 04/15/2019    EXTSEGS 62.0 04/15/2019    EXTPLATELETS 141 04/15/2019    EXTHEMOGLOBI 8.7 (L) 04/15/2019    EXTHEMATOCRI 27.6 (L) 04/15/2019    EXTCREATININ 0.84 04/15/2019    EXTSODIUM 140 04/15/2019    EXTPOTASSIUM 4.3 04/15/2019    EXTBUN 13 04/15/2019    EXTCO2 23.0 04/15/2019    EXTCALCIUM 8.6 04/15/2019    EXTGLUCOSE 128 (H) 04/15/2019    EXTALBUMIN 3.4 04/15/2019    EXTAST 37 04/15/2019    EXTALT 35 (H) 04/15/2019    EXTBILITOTAL 0.8 04/15/2019     Labs were reviewed with the patient    Assessment:     1. Diarrhea, unspecified type    2. Kidney replaced by transplant        Plan:   CKD IIIa-II s/p kidney transplantation  recent creatinine 1.1 [at baseline]. Continue to monitor renal function and electrolytes, HTN, secondary hyperparathyroidism and other issues related to underlying ESRD.   Next follow up [facility] clinic appointment in 6 months  Follow up labs q 2 months    Aplastic anemia from parvovirus B 19 infection.    Received IVIG x2 in May with evidence of improvement.  Continue close monitoring.  Parvovirus now undetectable.     Immunosuppression: Continue tacrolimus-based immunosuppression.   - Goal tacro dropped to 4-7  - will switch Cellcept to myfortic due to diarrhea with Cellcept  - will order c.diff and stool studies to rule out infectious etiology of diarrhea.   - Must watch closely for drug toxicity and med-related side effects       Hypomagnesemia:  Recent Labs   Lab 10/28/19  0844 11/19/19  0842 12/19/19  0940   Magnesium 1.6 1.6 1.5 L   - continue mag ox supplementation    OI prophylaxis:    -Bactrim origionally d/c'd  earlier d/t hyperkalemia, changed to atovaquone- now back on bactrim with adequate K level.  Due to complete course 1/19/20  -Valcyte study medication for CMV prophylaxis stopped d/t neutropenia.    Hypertension - BP today well controlled at 125/70     H/o Reflux- Protonix changed to Pepcid at time of previous visit. Pt now unable to fill Pepcid from pharmacy due to medication shortage. Will plan to check for OTC pepcid at pharmacy and if not available, can write short-term rx for Prilosec     h/o mood disorder:  Cont pre-txp meds      Follow-up:   Clinic: return to transplant clinic weekly for the first month after transplant; every 2 weeks during months 2-3; then at 6-, 9-, 12-, 18-, 24-, and 36- months post-transplant to reassess for complications from immunosuppression toxicity and monitor for rejection.  Annually thereafter.    Labs: since patient remains at high risk for rejection and drug-related complications that warrant close monitoring, labs will be ordered as follows: continue twice weekly CBC, renal panel, and drug level for first month; then same labs once weekly through 3rd month post-transplant.  Urine for UA and protein/creatinine ratio monthly.  Urine BK - PCR at 1-, 3-, 6-, 9-, 12-, 18-, 24-, and 36- months post-transplant.  Hepatic panel at 1-, 2-, 3-, 6-, 9-, 12-, 18-, 24-, and 36- months post-transplant.    Hailey Solis MD

## 2020-01-06 NOTE — PATIENT INSTRUCTIONS
From Dr. Watson:  It was great to see you today and participate in your post-transplant care!    -Please submit stool sample to ensure you do not have infection  -Wait to use imodium until stools done.  -We will try Myfortic instead of Cellcept to see if change helps the diarrhea. Keep us posted if problem is not better after stools completed and trying imodium.   Please be sure to let us know if you have any questions or concerns about your health care - we cannot help you if we do not know.  Don't forget we are on call 24/7 for any emergencies.   Best Wishes and Happy New Year,  Dr. Shirley Cabral

## 2020-01-06 NOTE — LETTER
January 6, 2020        Dalton Heaton  [address]  [facility] [place]  [place] [address]  Phone: [phone]  Fax: [phone]             [facility]  [address]  [facility] [address]  Phone: [phone]   Patient: Andra Newell   MR Number: [id number]   YOB: 1960   Date of Visit: 1/6/2020       Dear Dr. Dalton Heaton    Thank you for referring Andra Newell to me for evaluation. Attached you will find relevant portions of my assessment and plan of care.    If you have questions, please do not hesitate to call me. I look forward to following Andra Newell along with you.    Sincerely,    Maureen Cabral MD    Enclosure    If you would like to receive this communication electronically, please contact [email or web address] or [phone] to request [facility] Link access.    [facility] is a tool which provides read-only access to select patient information with whom you have a relationship. Its easy to use and provides real time access to review your patients record including encounter summaries, notes, results, and demographic information.    If you feel you have received this communication in error or would no longer like to receive these types of communications, please e-mail [email or web address]

## 2020-01-07 ENCOUNTER — OFFICE VISIT (OUTPATIENT)
Dept: FAMILY MEDICINE | Facility: CLINIC | Age: 60
End: 2020-01-07
Payer: MEDICARE

## 2020-01-07 ENCOUNTER — TELEPHONE (OUTPATIENT)
Dept: FAMILY MEDICINE | Facility: CLINIC | Age: 60
End: 2020-01-07

## 2020-01-07 VITALS
TEMPERATURE: 98 F | WEIGHT: 218.69 LBS | DIASTOLIC BLOOD PRESSURE: 70 MMHG | SYSTOLIC BLOOD PRESSURE: 130 MMHG | OXYGEN SATURATION: 97 % | HEART RATE: 85 BPM | BODY MASS INDEX: 34.33 KG/M2 | HEIGHT: 67 IN

## 2020-01-07 DIAGNOSIS — M48.062 LUMBAR STENOSIS WITH NEUROGENIC CLAUDICATION: ICD-10-CM

## 2020-01-07 DIAGNOSIS — Z94.0 KIDNEY REPLACED BY TRANSPLANT: Primary | ICD-10-CM

## 2020-01-07 DIAGNOSIS — I12.9 HYPERTENSION ASSOCIATED WITH STAGE 3 CHRONIC KIDNEY DISEASE DUE TO TYPE 2 DIABETES MELLITUS: ICD-10-CM

## 2020-01-07 DIAGNOSIS — Z94.0 STATUS POST LIVING-DONOR KIDNEY TRANSPLANTATION: ICD-10-CM

## 2020-01-07 DIAGNOSIS — N18.30 HYPERTENSION ASSOCIATED WITH STAGE 3 CHRONIC KIDNEY DISEASE DUE TO TYPE 2 DIABETES MELLITUS: ICD-10-CM

## 2020-01-07 DIAGNOSIS — F13.20 BENZODIAZEPINE DEPENDENCE, CONTINUOUS: ICD-10-CM

## 2020-01-07 DIAGNOSIS — D84.9 IMMUNOSUPPRESSION: ICD-10-CM

## 2020-01-07 DIAGNOSIS — E11.22 HYPERTENSION ASSOCIATED WITH STAGE 3 CHRONIC KIDNEY DISEASE DUE TO TYPE 2 DIABETES MELLITUS: ICD-10-CM

## 2020-01-07 DIAGNOSIS — K21.9 GASTROESOPHAGEAL REFLUX DISEASE WITHOUT ESOPHAGITIS: Primary | ICD-10-CM

## 2020-01-07 PROCEDURE — 3075F PR MOST RECENT SYSTOLIC BLOOD PRESS GE 130-139MM HG: ICD-10-PCS | Mod: CPTII,S$GLB,, | Performed by: NURSE PRACTITIONER

## 2020-01-07 PROCEDURE — 99215 OFFICE O/P EST HI 40 MIN: CPT | Mod: S$GLB,,, | Performed by: NURSE PRACTITIONER

## 2020-01-07 PROCEDURE — 99999 PR PBB SHADOW E&M-EST. PATIENT-LVL IV: ICD-10-PCS | Mod: PBBFAC,,, | Performed by: NURSE PRACTITIONER

## 2020-01-07 PROCEDURE — 3008F PR BODY MASS INDEX (BMI) DOCUMENTED: ICD-10-PCS | Mod: CPTII,S$GLB,, | Performed by: NURSE PRACTITIONER

## 2020-01-07 PROCEDURE — 3008F BODY MASS INDEX DOCD: CPT | Mod: CPTII,S$GLB,, | Performed by: NURSE PRACTITIONER

## 2020-01-07 PROCEDURE — 99215 PR OFFICE/OUTPT VISIT, EST, LEVL V, 40-54 MIN: ICD-10-PCS | Mod: S$GLB,,, | Performed by: NURSE PRACTITIONER

## 2020-01-07 PROCEDURE — 3078F DIAST BP <80 MM HG: CPT | Mod: CPTII,S$GLB,, | Performed by: NURSE PRACTITIONER

## 2020-01-07 PROCEDURE — 3075F SYST BP GE 130 - 139MM HG: CPT | Mod: CPTII,S$GLB,, | Performed by: NURSE PRACTITIONER

## 2020-01-07 PROCEDURE — 3078F PR MOST RECENT DIASTOLIC BLOOD PRESSURE < 80 MM HG: ICD-10-PCS | Mod: CPTII,S$GLB,, | Performed by: NURSE PRACTITIONER

## 2020-01-07 PROCEDURE — 99999 PR PBB SHADOW E&M-EST. PATIENT-LVL IV: CPT | Mod: PBBFAC,,, | Performed by: NURSE PRACTITIONER

## 2020-01-07 RX ORDER — MYCOPHENOLIC ACID 180 MG/1
360 TABLET, DELAYED RELEASE ORAL 2 TIMES DAILY
Qty: 120 TABLET | Refills: 11 | Status: SHIPPED | OUTPATIENT
Start: 2020-01-07 | End: 2020-07-24 | Stop reason: SDUPTHER

## 2020-01-07 RX ORDER — PANTOPRAZOLE SODIUM 20 MG/1
20 TABLET, DELAYED RELEASE ORAL DAILY
Qty: 30 TABLET | Refills: 11 | Status: SHIPPED | OUTPATIENT
Start: 2020-01-07 | End: 2020-03-10 | Stop reason: SDUPTHER

## 2020-01-07 NOTE — TELEPHONE ENCOUNTER
----- Message from Brook Olvera sent at 1/7/2020  3:03 PM CST -----  Contact: [facility]  Type:  Pharmacy Calling to Clarify an RX    Name of Caller:  Soniya  Pharm[facility] Name:  [facility]  Prescription Name:  pantoprazole (PROTONIX) 20 MG tablet  What do they need to clarify?:  Drug interaction with mycophenolate (MYFORTIC) 180 MG TbEC- making sure  Is aware  Best Call Back Number:  [phone]  Additional Information:

## 2020-01-07 NOTE — ASSESSMENT & PLAN NOTE
Followed by neuro and neurosurgery  - Was seen by neurosurgery 12/11/2019.  Recommended continued conservative management given that she has no neurologic deficit and surgical intervention may not help her axial back pain.   - Discussed physical therapy and referred to pain mgmt. Follow up if no improvement.

## 2020-01-07 NOTE — ASSESSMENT & PLAN NOTE
s/p living L kidney transplant 1/14/2019  - followed by transplant team and ID  - On myfortic and profgraf

## 2020-01-07 NOTE — ASSESSMENT & PLAN NOTE
Pt also complaining of reflux. No longer taking pepcid due to being on backorder. Taking approximately 10 tums daily.   Will change to Protonix (clear with transplant team first).

## 2020-01-07 NOTE — ASSESSMENT & PLAN NOTE
Followed by endocrinology  - On  U500 kwikpen 65 units TID with meals  - Now has Cody, checking bs 4x daily. Last 30 days fbg 100-175, ppg 101-265.   - Of note, 16 episodes of hypoglycemia between 12am-3am.   - diet reviewed,  pt encourage to keep lunch when eating out to <600 calories.   - will decrease before dinner insulin dose to 60 units, cont 65 unit with breakfast and lunch.   - pt to call if bs <70.

## 2020-01-07 NOTE — PATIENT INSTRUCTIONS
- Start protonix 20mg daily  - Decrease insulin to 60 units at night, continue 65 units with breakfast and lunch. Call for blood sugar <70.

## 2020-01-07 NOTE — PROGRESS NOTES
Primary Care Provider Appointment    Subjective:      Patient ID: Andra Newell is a 59 y.o. female with history of HTN, HLD, T2DM c/b ESRD s/p living L kidney transplant 1/14/2019, On immunosupressant, DJD, Asthma, hypothyroidism, HPT    Chief Complaint: Follow-up    Accompanied by friend.    Was seen by neurosurgery 12/11/2019.  Recommended continued conservative management given that she has no neurologic deficit and surgical intervention may not help her axial back pain. Discussed physical therapy and referred to pain mgmt. Follow up if no improvement.    Pt was cleared to restart metformin with transplant team. Has been taking metformin without any issues. BS log reviewed. See photo attachments.     Diet reviewed:  10am: 2 sticks of cheese and 1 banana.   12pm: mostly eats out (popeyes, canes, [employer])   3pm: snack (100 calories cheese kavin)  6pm: partner cooks (usually protein, starch, and vegetables) complaint with portion control using plate method for dinner.   *Sometimes will have after dinner snack (ice cream cone), usually once a week.     Pt also complaining of reflux. No longer taking pepcid due to being on backorder. Taking approximately 10 tums daily. Will change to Protonix (clear with transplant team first).     Followed by:  Optometrist: Dr. Arias. Recently had annual exam, records requested.   Endocrinology: Mana Webber NP. Last seen 10/2/19. D/c tresiba and novolog. Start U500 kwikpen 80 u tid. Continue to test bg 4 times a day. RTC in 3 months with labs.   Infectious Disease: Dr. Cammie Kessler. Last seen 5/31/19. Resolution of UTI, diarrhea, and anemia after course of IVIg, withdrawal from letermovir trial, and decrease in immunosuppression.  Neurology: Dr. Ashish Walden. Last seen 7/29/19. F/u post renal transplant for neurogenic claudication secondary to lumbar stenosis. Has a left side achilles tendon rupture w/o repair. Her gait instability is a combination of both problems.  Advised to see back and spine surgery for planning for surgery. Referred to EMG/NCS  Prognostically. f/u with neurology in 3 months.  Pain Mgmt: Dr. Yonny Ferrer. Last seen 11/11/19.  s/p right L2-5 MB RFA on 10/29/19 and reports about 50% relief so far. If pain persist, may consider Caudal IVETH. F/u PRN   Transplant: Dr. Maureen Cabral. Last seen 10/28/19. resume CellCept with close monitoring of her white count.  Will start at low dose of 250 mg, 1 cap twice daily. Continue serial monitoring of parvovirus PCR to ensure counts do not worsen.   Urology: Dr. Jacqui Artis. Last seen 4/10/19. Followed for recurrent UTI and urgency. Urethroscopy:  Normal.  Cystoscopy:  Normal bladder mucosa, right dome implanted ureter noted with + efflux. RTC prn   Nephrology: Dr. Heaton: Last visit 05/2019, RTC 01/2020. Will request records.   Psychiatry: Dr. Sheth: Last seen 10/2019. No changes per pt. RTC in 3 mths.   Therapist: Mague Muniz. Sees weekly for CBT.       Past Surgical History:   Procedure Laterality Date    ACHILLES TENDON SURGERY Left May 2015    BACK SURGERY      CHOLECYSTECTOMY      COLONOSCOPY N/A 9/6/2017    Procedure: COLONOSCOPY;  Surgeon: Marisol Bryson MD;  Location[facility];  Service: Endoscopy;  Laterality: N/A; REPEAT IN 3 YEARS FOR SURVEILLANCE    COLONOSCOPY N/A 5/9/2019    Procedure: COLONOSCOPY;  Surgeon: Fady Ewing MD;  [facility]  Service: Endoscopy;  Laterality: N/A;    DIALYSIS FISTULA CREATION  12/2017    ESOPHAGOGASTRODUODENOSCOPY N/A 5/9/2019    Procedure: EGD (ESOPHAGOGASTRODUODENOSCOPY);  Surgeon: Fady Ewing MD;  Location: [facility] (2ND FLR);  Service: Endoscopy;  Laterality: N/A;    HEMORRHOID SURGERY      INJECTION OF ANESTHETIC AGENT AROUND MEDIAL BRANCH NERVES INNERVATING LUMBAR FACET JOINT Right 9/25/2019    Procedure: Block-nerve-medial branch-lumbar;  Surgeon: Yonny Ferrer MD;  Location: [facility];  Service: Pain Management;  Laterality:  Right;  L2, 3, 4,5     INJECTION OF ANESTHETIC AGENT AROUND MEDIAL BRANCH NERVES INNERVATING LUMBAR FACET JOINT Right 10/16/2019    Procedure: Block-nerve-medial branch-lumbar;  Surgeon: Yonny Ferrer MD;  Location: [facility];  Service: Pain Management;  Laterality: Right;  L2, 3, 4,5     JOINT REPLACEMENT      left    KIDNEY TRANSPLANT N/A 1/14/2019    Procedure: TRANSPLANT, KIDNEY;  Surgeon: Roland Salazar MD;  [facility];  Service: Transplant;  Laterality: N/A;    KNEE SURGERY      RADIOFREQUENCY ABLATION OF LUMBAR MEDIAL BRANCH NERVE AT SINGLE LEVEL Right 10/29/2019    Procedure: Radiofrequency Ablation, Nerve, Spinal, Lumbar, Medial Branch, 1 Level;  Surgeon: Yonny Ferrer MD;  Location: [facility];  Service: Pain Management;  Laterality: Right;  L2, 3, 4, 5  burned at 80 degrees C X 60 seconds each site X 2    SHOULDER ARTHROSCOPY      SHOULDER SURGERY      UPPER GASTROINTESTINAL ENDOSCOPY  ~2013    Dr. Moralez       Past Medical History:   Diagnosis Date    Anemia     Anemia in chronic kidney disease, on chronic dialysis 7/12/2017    Anxiety and depression     Aplastic anemia with parvovirus B19 infection 5/27/2019    Arthritis     Asthma     allergic airway    CKD stage III (moderate) 2/18/2019    Colon polyp     Depression     Diabetes mellitus     Diverticulosis     Eosinophilia     ESRD (end stage renal disease)     stage V, due for living donor 9/2018    ESRD on dialysis     GERD (gastroesophageal reflux disease)     Gout     Gout     Hyperlipidemia     Hypertension     Hypertriglyceridemia without hypercholesterolemia 6/9/2019    Low back pain     Low HDL (under 40) 6/9/2019    MRSA carrier     Neutropenia, unspecified 5/8/2019    Obesity     Renal disorder     Rotator cuff syndrome--left     Thyroid disease     Ulnar neuropathy of right upper extremity     Uncontrolled type 2 diabetes mellitus with hyperglycemia        Social History     Socioeconomic History     Marital status: Significant Other     Spouse name: Any Alvarez    Number of children: 2    Years of education: Not on file    Highest education level: 12th grade   Occupational History    Occupation: retired     Comment:    Social Needs    Financial resource strain: Not hard at all    Food insecurity:     Worry: Never true     Inability: Never true    Transportation needs:     Medical: No     Non-medical: No   Tobacco Use    Smoking status: Never Smoker    Smokeless tobacco: Never Used   Substance and Sexual Activity    Alcohol use: No    Drug use: No    Sexual activity: Not Currently   Lifestyle    Physical activity:     Days per week: 0 days     Minutes per session: 0 min    Stress: Very much   Relationships    Social connections:     Talks on phone: Three times a week     Gets together: Once a week     Attends Anabaptist service: 1 to 4 times per year     Active member of club or organization: No     Attends meetings of clubs or organizations: Never     Relationship status: Living with partner   Other Topics Concern    Not on file   Social History Narrative     female    Disabled since 2015 due to DDD and severe osteoarthritis of knee and hips    Previously worked as  at [employer]    Lives in residential 1 story home with partner, Any.     Has 2 boys, 1 lives in [place], 1 in [place].         Nondenominational: Yazdanism    Hobbies: painting and crafts.         Review of Systems   Constitutional: Positive for fatigue. Negative for activity change, appetite change, fever and unexpected weight change.   HENT: Negative for congestion, hearing loss, sore throat, trouble swallowing and voice change.    Eyes: Negative for visual disturbance.   Respiratory: Negative for apnea, cough, chest tightness, shortness of breath and wheezing.    Cardiovascular: Negative for chest pain, palpitations and leg swelling.   Gastrointestinal: Negative for abdominal  "pain, blood in stool, constipation, diarrhea, nausea and vomiting.   Endocrine: Negative for cold intolerance, heat intolerance, polydipsia, polyphagia and polyuria.   Genitourinary: Negative for difficulty urinating, dysuria, enuresis, flank pain, frequency, hematuria, pelvic pain and urgency.   Musculoskeletal: Positive for back pain. Negative for arthralgias, gait problem, joint swelling, myalgias, neck pain and neck stiffness.   Skin: Negative for rash and wound.   Neurological: Negative for dizziness, tremors, syncope, weakness, light-headedness, numbness and headaches.   Hematological: Negative for adenopathy. Does not bruise/bleed easily.   Psychiatric/Behavioral: Negative for dysphoric mood and sleep disturbance. The patient is not nervous/anxious.        Objective:   /70 (BP Location: Right arm, Patient Position: Sitting, BP Method: Large (Manual))   Pulse 85   Temp 98 °F (36.7 °C) (Oral)   Ht 5' 7" (1.702 m)   Wt 99.2 kg (218 lb 11.1 oz)   LMP 02/28/2012   SpO2 97%   BMI 34.25 kg/m²     Physical Exam   Constitutional: She is oriented to person, place, and time. She appears well-developed and well-nourished. No distress.   HENT:   Head: Normocephalic and atraumatic.   Cardiovascular: Normal rate and regular rhythm. Exam reveals no gallop and no friction rub.   Murmur heard.   Systolic murmur is present with a grade of 1/6.  Pulmonary/Chest: Effort normal and breath sounds normal. No respiratory distress. She has no wheezes. She exhibits no tenderness.   Abdominal: Soft. Bowel sounds are normal. She exhibits no distension and no mass. There is no tenderness. There is no rebound and no guarding.   Musculoskeletal: Normal range of motion. She exhibits no edema, tenderness or deformity.   Neurological: She is alert and oriented to person, place, and time.   Skin: Skin is warm and dry. She is not diaphoretic.   Psychiatric: She has a normal mood and affect. Her behavior is normal. Judgment and " thought content normal.   Nursing note and vitals reviewed.          Lab Results   Component Value Date    WBC 9.06 12/19/2019    HGB 12.2 12/19/2019    HCT 38.7 12/19/2019     12/19/2019    CHOL 138 11/21/2019    TRIG 148 11/21/2019    HDL 36 (L) 11/21/2019    ALT 27 10/14/2019    AST 24 10/14/2019     12/19/2019    K 5.0 12/19/2019     12/19/2019    CREATININE 1.1 12/19/2019    BUN 25 (H) 12/19/2019    CO2 25 12/19/2019    TSH 1.026 10/02/2019    INR 1.1 05/07/2019    HGBA1C 8.5 (H) 10/02/2019       Current Outpatient Medications on File Prior to Visit   Medication Sig Dispense Refill    blood sugar diagnostic (BLOOD GLUCOSE TEST) Strp Checks her bg 4 times a day   e11.9 400 strip 4    insulin regular hum U-500 conc (HUMULIN R U-500, CONC, KWIKPEN) 500 unit/mL (3 mL) InPn 100 units breakfast and dinner 3 Syringe 12    levothyroxine (SYNTHROID) 75 MCG tablet Take 1 tablet (75 mcg total) by mouth once daily. 90 tablet 3    LORazepam (ATIVAN) 1 MG tablet Take 1 mg by mouth every evening.      magnesium oxide 500 mg Tab Take 500 mg by mouth once daily.  0    metFORMIN (GLUCOPHAGE-XR) 500 MG 24 hr tablet Take 1 tablet (500 mg total) by mouth 2 (two) times daily with meals. 180 tablet 3    multivitamin (THERAGRAN) tablet Take 1 tablet by mouth once daily.      rosuvastatin (CRESTOR) 10 MG tablet Take 1 tablet (10 mg total) by mouth every evening. 90 tablet 3    tacrolimus (PROGRAF) 0.5 MG Cap Take 1 capsule (0.5 mg total) by mouth every evening. Total 2mg in AM and 1.5mg in PM Z94.0 30 capsule 11    tacrolimus (PROGRAF) 1 MG Cap Take 2 capsules (2 mg total) by mouth every morning AND 1 capsule (1 mg total) every evening. Total 2mg in AM and 1.5mg in PM. Z94.0. 90 capsule 11    vilazodone (VIIBRYD) 40 mg Tab tablet Take 1 tablet (40 mg total) by mouth once daily. 30 tablet 4    [DISCONTINUED] mycophenolate (MYFORTIC) 180 MG TbEC Take 2 tablets (360 mg total) by mouth 2 (two) times daily.  120 tablet 11    HYDROcodone-acetaminophen (NORCO) 7.5-325 mg per tablet Take 1 tablet by mouth every 6 (six) hours as needed for Pain.      sulfamethoxazole-trimethoprim 400-80mg (BACTRIM,SEPTRA) 400-80 mg per tablet Take 1 tablet by mouth once daily. 30 tablet 6    [DISCONTINUED] famotidine (PEPCID) 20 MG tablet Take 1 tablet (20 mg total) by mouth 2 (two) times daily. (Patient not taking: Reported on 1/7/2020) 60 tablet 11     Current Facility-Administered Medications on File Prior to Visit   Medication Dose Route Frequency Provider Last Rate Last Dose    sodium chloride 0.9% bolus 2,000 mL  2,000 mL Intravenous Once Maureen Cabral MD                     Assessment:   59 y.o. female with multiple co-morbid illnesses here to continue chronic care management.     Plan:     Problem List Items Addressed This Visit        Neuro    Lumbar stenosis with neurogenic claudication     Followed by neuro and neurosurgery  - Was seen by neurosurgery 12/11/2019.  Recommended continued conservative management given that she has no neurologic deficit and surgical intervention may not help her axial back pain.   - Discussed physical therapy and referred to pain mgmt. Follow up if no improvement.              Psychiatric    Benzodiazepine dependence, continuous     Followed by psychiatry, Dr. Sheth  On ativan 1mg nightly for anxiety   - hospitalized once for anxiety and depression 3-4 years ago  - currently exacerbated due to pain  - seeing therapist once weekly for CBT  - requested records from psychiatry             Cardiac/Vascular    Hypertension associated with stage 3 chronic kidney disease due to type 2 diabetes mellitus     BP currently controlled with lifestyle modifications   - encourage to restart exercise  - encourage low salt diet             Renal/    Status post living-donor kidney transplantation     S/p living kidney transplant 1/14/2019, left  Followed by transplant team  - on myfortic             Immunology/Multi System    Immunosuppression     s/p living L kidney transplant 1/14/2019  - followed by transplant team and ID  - On myfortic and profgraf            Endocrine    Uncontrolled type 2 diabetes mellitus with diabetic nephropathy, with long-term current use of insulin     Followed by endocrinology  - On  U500 kwikpen 65 units TID with meals  - Now has Cody, checking bs 4x daily. Last 30 days fbg 100-175, ppg 101-265.   - Of note, 16 episodes of hypoglycemia between 12am-3am.   - diet reviewed, pt encourage to keep lunch when eating out to <600 calories.   - will decrease before dinner insulin dose to 60 units, cont 65 unit with breakfast and lunch.   - pt to call if bs <70.               GI    Gastroesophageal reflux disease without esophagitis - Primary     Pt also complaining of reflux. No longer taking pepcid due to being on backorder. Taking approximately 10 tums daily.   Will change to Protonix (clear with transplant team first).          Relevant Medications    pantoprazole (PROTONIX) 20 MG tablet          Health Maintenance       Date Due Completion Date    Shingles Vaccine (1 of 2) 02/15/2008 ---    Hemoglobin A1c 01/02/2020 10/2/2019    Eye Exam 09/17/2020 9/17/2019    Lipid Panel 11/21/2020 11/21/2019    Foot Exam 12/11/2020 12/11/2019    Override on 10/2/2019: Done    Mammogram 12/03/2021 12/3/2019    Pap Smear with HPV Cotest 09/04/2023 9/4/2018    Pneumococcal Vaccine (Highest Risk) (3 of 3 - PPSV23) 04/30/2024 4/30/2019    Colonoscopy 05/09/2024 5/9/2019    TETANUS VACCINE 07/12/2027 7/12/2017          Follow up in about 4 weeks (around 2/4/2020). Total face-to-face time was 60 min, 50% of this was spent on counseling and coordination of care.     GHULAM Aceves-KAYE  Family Medicine  [facility] [place]

## 2020-01-09 ENCOUNTER — CLINICAL SUPPORT (OUTPATIENT)
Dept: ENDOCRINOLOGY | Facility: CLINIC | Age: 60
End: 2020-01-09
Payer: MEDICARE

## 2020-01-10 ENCOUNTER — TELEPHONE (OUTPATIENT)
Dept: ENDOCRINOLOGY | Facility: CLINIC | Age: 60
End: 2020-01-10

## 2020-01-10 NOTE — TELEPHONE ENCOUNTER
Please have patient decrease lunch and dinner dose of U500 to 55 units  Continue 65 units breakfast

## 2020-01-13 ENCOUNTER — PATIENT MESSAGE (OUTPATIENT)
Dept: TRANSPLANT | Facility: CLINIC | Age: 60
End: 2020-01-13

## 2020-01-15 NOTE — PROGRESS NOTES
"CC: This 59 y.o. female presents for management of diabetes  and chronic conditions pending review including HTN, HLP, ESRD s/p kidney txp 01/14/2019, hypothyroidism, vitamin d deficiency, obesity     HPI: She  was diagnosed with T2DM in 2005. Has never been hospitalized r/t DM.  Family hx of DM: grandmother     Had MRI on her back yesterday, having major back issues  Lots pain, more so after awakening in am  She is possibly considering a stimulator w NS in [employer] downloaded- see in Media tab  Having frequent hypoglycemia overnight despite insulin titration  Very large bg excursions noted particularlly at lunch but seem to start upon wakening  Dinner has been having ~ 15-30 cho   Lunch out most days ([employer], Chinese)     Diet: Eats 3  Meals a day, snacks- mini bag of cheetos or mini candy - after lunch before dinner  May skip breakfast and have "brunch" bc she wakes up late  Breakfast 2 pieces of string cheese and banana typically  Exercise: none-  Plans to start pool Monday at [employer]  CURRENT DM MEDS: U500 65-55-55  Vial/pen:  Uses pens  Glucometer type:  Relion    Standards of Care:  Eye exam: 9/2019, No DR, [facility]    ROS:   Gen: Appetite good, +weight gain 10 lbs, + fatigue   Skin: Skin is intact and heals well, no rashes, no hair changes  Eyes: Denies visual disturbances  Resp: BOYER, no cough  Cardiac: No palpitations, chest pain, no edema   GI: + nausea - w back pain, no vomiting, diarrhea, constipation, or abdominal pain.  /GYN:  No nocturia, burning or pain.   MS/Neuro: Denies numbness/ tingling in BLE; Gait steady, speech clear  Psych: Denies drug/ETOH abuse + depression.  Other systems: negative.    PE:  GENERAL: Well developed, well nourished.  PSYCH: AAOx3, appropriate mood and affect, pleasant expression, conversant, appears relaxed, well groomed.   EYES: Conjunctiva, corneas clear    NECK: Supple, trachea midline  ABDOMEN: Soft, non-tender, non-distended   VASCULAR: DP pulses +2/4 " bilaterally, no edema.  NEURO: Gait steady  SKIN: Skin warm and dry; no acanthosis nigracans.   FOOT EXAMINATION: 10/2/19  No foot deformity, corns or callus formation,  nails in good condiiton and well trimmed, no interspace maceration or ulceration noted.  Decreased hair growth present over toes/feet.    Protective sensation intact with 10 gram monofilament.  +2 dorsalis pedis and posterior pulses noted.      Lab Results   Component Value Date    MICALBCREAT 1297.8 (H) 07/02/2018       Hemoglobin A1C   Date Value Ref Range Status   10/02/2019 8.5 (H) 4.0 - 5.6 % Final     Comment:     ADA Screening Guidelines:  5.7-6.4%  Consistent with prediabetes  >or=6.5%  Consistent with diabetes  High levels of fetal hemoglobin interfere with the HbA1C  assay. Heterozygous hemoglobin variants (HbS, HgC, etc)do  not significantly interfere with this assay.   However, presence of multiple variants may affect accuracy.     05/27/2019 7.1 (H) 4.0 - 5.6 % Final     Comment:     ADA Screening Guidelines:  5.7-6.4%  Consistent with prediabetes  >or=6.5%  Consistent with diabetes  High levels of fetal hemoglobin interfere with the HbA1C  assay. Heterozygous hemoglobin variants (HbS, HgC, etc)do  not significantly interfere with this assay.   However, presence of multiple variants may affect accuracy.     05/07/2019 8.9 (H) 4.0 - 5.6 % Final     Comment:     ADA Screening Guidelines:  5.7-6.4%  Consistent with prediabetes  >or=6.5%  Consistent with diabetes  High levels of fetal hemoglobin interfere with the HbA1C  assay. Heterozygous hemoglobin variants (HbS, HgC, etc)do  not significantly interfere with this assay.   However, presence of multiple variants may affect accuracy.     05/07/2019 8.9 (H) 4.0 - 5.6 % Final     Comment:     ADA Screening Guidelines:  5.7-6.4%  Consistent with prediabetes  >or=6.5%  Consistent with diabetes  High levels of fetal hemoglobin interfere with the HbA1C  assay. Heterozygous hemoglobin variants  (HbS, HgC, etc)do  not significantly interfere with this assay.   However, presence of multiple variants may affect accuracy.          ASSESSMENT and PLAN:    1. T2DM with hyperglycemia, CKD 3-  A1C w next lab draw on 1/23  Strongly suspect the bg elevations in am related to pain, more insulin resistance in am as well   Change U500 kwikpen 120 breakfast, 20 u dinner  - needs some overnight coverage  U500 acts as both a basal and prandial insulin and has  A 1/2 life similar to NPH  Log in 1 week or sooner for continued hypos   Continue Cody  - takes statin    2. HTN - controlled, continue meds as previously prescribed and monitor.     3. HLP - on statin therapy, LFTs WNL    4. S/p kidney txp- followed by KTS    5.  Immunosuppression - agents may increase bg readings    6. Hypothyroidism - takes with all other meds, check w RTC    7. Obesity- Resume weight loss, exercise as tolerated Body mass index is 34.82 kg/m².     8. Back pain- increasing bg       Follow-up: in 3 months with lab prior

## 2020-01-16 ENCOUNTER — OFFICE VISIT (OUTPATIENT)
Dept: ENDOCRINOLOGY | Facility: CLINIC | Age: 60
End: 2020-01-16
Payer: MEDICARE

## 2020-01-16 ENCOUNTER — PATIENT MESSAGE (OUTPATIENT)
Dept: ENDOCRINOLOGY | Facility: CLINIC | Age: 60
End: 2020-01-16

## 2020-01-16 VITALS
DIASTOLIC BLOOD PRESSURE: 80 MMHG | SYSTOLIC BLOOD PRESSURE: 160 MMHG | BODY MASS INDEX: 34.89 KG/M2 | TEMPERATURE: 98 F | WEIGHT: 222.31 LBS | HEART RATE: 84 BPM | HEIGHT: 67 IN

## 2020-01-16 DIAGNOSIS — E11.69 MIXED DIABETIC HYPERLIPIDEMIA ASSOCIATED WITH TYPE 2 DIABETES MELLITUS: ICD-10-CM

## 2020-01-16 DIAGNOSIS — M54.50 CHRONIC BILATERAL LOW BACK PAIN WITHOUT SCIATICA: ICD-10-CM

## 2020-01-16 DIAGNOSIS — Z94.0 STATUS POST LIVING-DONOR KIDNEY TRANSPLANTATION: ICD-10-CM

## 2020-01-16 DIAGNOSIS — E03.9 ACQUIRED HYPOTHYROIDISM: ICD-10-CM

## 2020-01-16 DIAGNOSIS — I12.9 HYPERTENSION ASSOCIATED WITH STAGE 3 CHRONIC KIDNEY DISEASE DUE TO TYPE 2 DIABETES MELLITUS: ICD-10-CM

## 2020-01-16 DIAGNOSIS — D84.9 IMMUNOSUPPRESSION: ICD-10-CM

## 2020-01-16 DIAGNOSIS — E11.22 HYPERTENSION ASSOCIATED WITH STAGE 3 CHRONIC KIDNEY DISEASE DUE TO TYPE 2 DIABETES MELLITUS: ICD-10-CM

## 2020-01-16 DIAGNOSIS — E66.9 OBESITY (BMI 30-39.9): ICD-10-CM

## 2020-01-16 DIAGNOSIS — N18.30 HYPERTENSION ASSOCIATED WITH STAGE 3 CHRONIC KIDNEY DISEASE DUE TO TYPE 2 DIABETES MELLITUS: ICD-10-CM

## 2020-01-16 DIAGNOSIS — G89.29 CHRONIC BILATERAL LOW BACK PAIN WITHOUT SCIATICA: ICD-10-CM

## 2020-01-16 DIAGNOSIS — E78.2 MIXED DIABETIC HYPERLIPIDEMIA ASSOCIATED WITH TYPE 2 DIABETES MELLITUS: ICD-10-CM

## 2020-01-16 PROBLEM — N39.0 FREQUENT UTI: Status: RESOLVED | Noted: 2019-11-19 | Resolved: 2020-01-16

## 2020-01-16 PROCEDURE — 99214 PR OFFICE/OUTPT VISIT, EST, LEVL IV, 30-39 MIN: ICD-10-PCS | Mod: S$GLB,,, | Performed by: NURSE PRACTITIONER

## 2020-01-16 PROCEDURE — 99999 PR PBB SHADOW E&M-EST. PATIENT-LVL III: CPT | Mod: PBBFAC,,, | Performed by: NURSE PRACTITIONER

## 2020-01-16 PROCEDURE — 99214 OFFICE O/P EST MOD 30 MIN: CPT | Mod: S$GLB,,, | Performed by: NURSE PRACTITIONER

## 2020-01-16 PROCEDURE — 99999 PR PBB SHADOW E&M-EST. PATIENT-LVL III: ICD-10-PCS | Mod: PBBFAC,,, | Performed by: NURSE PRACTITIONER

## 2020-01-16 PROCEDURE — 95251 CONT GLUC MNTR ANALYSIS I&R: CPT | Mod: S$GLB,,, | Performed by: NURSE PRACTITIONER

## 2020-01-16 PROCEDURE — 95251 PR GLUCOSE MONITOR, 72 HOUR, PHYS INTERP: ICD-10-PCS | Mod: S$GLB,,, | Performed by: NURSE PRACTITIONER

## 2020-01-21 ENCOUNTER — PATIENT OUTREACH (OUTPATIENT)
Dept: ADMINISTRATIVE | Facility: HOSPITAL | Age: 60
End: 2020-01-21

## 2020-01-23 ENCOUNTER — PATIENT MESSAGE (OUTPATIENT)
Dept: TRANSPLANT | Facility: CLINIC | Age: 60
End: 2020-01-23

## 2020-01-23 ENCOUNTER — PATIENT MESSAGE (OUTPATIENT)
Dept: SPINE | Facility: CLINIC | Age: 60
End: 2020-01-23

## 2020-01-23 ENCOUNTER — PATIENT MESSAGE (OUTPATIENT)
Dept: ENDOCRINOLOGY | Facility: CLINIC | Age: 60
End: 2020-01-23

## 2020-01-24 ENCOUNTER — LAB VISIT (OUTPATIENT)
Dept: LAB | Facility: HOSPITAL | Age: 60
End: 2020-01-24
Attending: INTERNAL MEDICINE
Payer: MEDICARE

## 2020-01-24 DIAGNOSIS — Z94.0 KIDNEY REPLACED BY TRANSPLANT: ICD-10-CM

## 2020-01-24 LAB
ALBUMIN SERPL BCP-MCNC: 4.1 G/DL (ref 3.5–5.2)
ANION GAP SERPL CALC-SCNC: 11 MMOL/L (ref 8–16)
BASOPHILS # BLD AUTO: 0.04 K/UL (ref 0–0.2)
BASOPHILS NFR BLD: 0.4 % (ref 0–1.9)
BUN SERPL-MCNC: 26 MG/DL (ref 8–23)
CALCIUM SERPL-MCNC: 10.4 MG/DL (ref 8.7–10.5)
CHLORIDE SERPL-SCNC: 104 MMOL/L (ref 95–110)
CO2 SERPL-SCNC: 26 MMOL/L (ref 23–29)
CREAT SERPL-MCNC: 1.2 MG/DL (ref 0.5–1.4)
DIFFERENTIAL METHOD: ABNORMAL
EOSINOPHIL # BLD AUTO: 0.7 K/UL (ref 0–0.5)
EOSINOPHIL NFR BLD: 6.2 % (ref 0–8)
ERYTHROCYTE [DISTWIDTH] IN BLOOD BY AUTOMATED COUNT: 13.3 % (ref 11.5–14.5)
EST. GFR  (AFRICAN AMERICAN): 56.4 ML/MIN/1.73 M^2
EST. GFR  (NON AFRICAN AMERICAN): 48.9 ML/MIN/1.73 M^2
ESTIMATED AVG GLUCOSE: 143 MG/DL (ref 68–131)
GLUCOSE SERPL-MCNC: 167 MG/DL (ref 70–110)
HBA1C MFR BLD HPLC: 6.6 % (ref 4–5.6)
HCT VFR BLD AUTO: 38.5 % (ref 37–48.5)
HGB BLD-MCNC: 12.3 G/DL (ref 12–16)
IMM GRANULOCYTES # BLD AUTO: 0.05 K/UL (ref 0–0.04)
IMM GRANULOCYTES NFR BLD AUTO: 0.4 % (ref 0–0.5)
LYMPHOCYTES # BLD AUTO: 0.8 K/UL (ref 1–4.8)
LYMPHOCYTES NFR BLD: 7.2 % (ref 18–48)
MAGNESIUM SERPL-MCNC: 1.4 MG/DL (ref 1.6–2.6)
MCH RBC QN AUTO: 30.5 PG (ref 27–31)
MCHC RBC AUTO-ENTMCNC: 31.9 G/DL (ref 32–36)
MCV RBC AUTO: 96 FL (ref 82–98)
MONOCYTES # BLD AUTO: 0.6 K/UL (ref 0.3–1)
MONOCYTES NFR BLD: 5 % (ref 4–15)
NEUTROPHILS # BLD AUTO: 9.2 K/UL (ref 1.8–7.7)
NEUTROPHILS NFR BLD: 80.8 % (ref 38–73)
NRBC BLD-RTO: 0 /100 WBC
PHOSPHATE SERPL-MCNC: 4.6 MG/DL (ref 2.7–4.5)
PLATELET # BLD AUTO: 227 K/UL (ref 150–350)
PMV BLD AUTO: 9.9 FL (ref 9.2–12.9)
POTASSIUM SERPL-SCNC: 5.3 MMOL/L (ref 3.5–5.1)
RBC # BLD AUTO: 4.03 M/UL (ref 4–5.4)
SODIUM SERPL-SCNC: 141 MMOL/L (ref 136–145)
WBC # BLD AUTO: 11.35 K/UL (ref 3.9–12.7)

## 2020-01-24 PROCEDURE — 80197 ASSAY OF TACROLIMUS: CPT

## 2020-01-24 PROCEDURE — 83735 ASSAY OF MAGNESIUM: CPT

## 2020-01-24 PROCEDURE — 83036 HEMOGLOBIN GLYCOSYLATED A1C: CPT

## 2020-01-24 PROCEDURE — 80069 RENAL FUNCTION PANEL: CPT

## 2020-01-24 PROCEDURE — 36415 COLL VENOUS BLD VENIPUNCTURE: CPT | Mod: PO

## 2020-01-24 PROCEDURE — 85025 COMPLETE CBC W/AUTO DIFF WBC: CPT

## 2020-01-25 ENCOUNTER — TELEPHONE (OUTPATIENT)
Dept: ENDOCRINOLOGY | Facility: CLINIC | Age: 60
End: 2020-01-25

## 2020-01-25 DIAGNOSIS — Z79.4 TYPE 2 DIABETES MELLITUS WITH HYPERGLYCEMIA, WITH LONG-TERM CURRENT USE OF INSULIN: Primary | ICD-10-CM

## 2020-01-25 DIAGNOSIS — E11.65 TYPE 2 DIABETES MELLITUS WITH HYPERGLYCEMIA, WITH LONG-TERM CURRENT USE OF INSULIN: Primary | ICD-10-CM

## 2020-01-25 LAB — TACROLIMUS BLD-MCNC: 8.6 NG/ML (ref 5–15)

## 2020-01-26 ENCOUNTER — PATIENT MESSAGE (OUTPATIENT)
Dept: TRANSPLANT | Facility: CLINIC | Age: 60
End: 2020-01-26

## 2020-01-29 ENCOUNTER — LAB VISIT (OUTPATIENT)
Dept: LAB | Facility: HOSPITAL | Age: 60
End: 2020-01-29
Payer: MEDICARE

## 2020-01-29 ENCOUNTER — RESEARCH ENCOUNTER (OUTPATIENT)
Dept: RESEARCH | Facility: HOSPITAL | Age: 60
End: 2020-01-29
Payer: MEDICARE

## 2020-01-29 ENCOUNTER — PATIENT MESSAGE (OUTPATIENT)
Dept: TRANSPLANT | Facility: CLINIC | Age: 60
End: 2020-01-29

## 2020-01-29 ENCOUNTER — PATIENT MESSAGE (OUTPATIENT)
Dept: PRIMARY CARE CLINIC | Facility: CLINIC | Age: 60
End: 2020-01-29

## 2020-01-29 VITALS
RESPIRATION RATE: 18 BRPM | HEART RATE: 81 BPM | TEMPERATURE: 99 F | DIASTOLIC BLOOD PRESSURE: 73 MMHG | SYSTOLIC BLOOD PRESSURE: 139 MMHG | BODY MASS INDEX: 34.81 KG/M2 | WEIGHT: 222.25 LBS

## 2020-01-29 DIAGNOSIS — Z79.4 TYPE 2 DIABETES MELLITUS WITH HYPERGLYCEMIA, WITH LONG-TERM CURRENT USE OF INSULIN: ICD-10-CM

## 2020-01-29 DIAGNOSIS — Z94.0 KIDNEY REPLACED BY TRANSPLANT: ICD-10-CM

## 2020-01-29 DIAGNOSIS — Z00.6 RESEARCH STUDY PATIENT: ICD-10-CM

## 2020-01-29 DIAGNOSIS — E11.65 TYPE 2 DIABETES MELLITUS WITH HYPERGLYCEMIA, WITH LONG-TERM CURRENT USE OF INSULIN: ICD-10-CM

## 2020-01-29 LAB
ALBUMIN SERPL BCP-MCNC: 3.6 G/DL (ref 3.5–5.2)
ANION GAP SERPL CALC-SCNC: 8 MMOL/L (ref 8–16)
BASOPHILS # BLD AUTO: 0.04 K/UL (ref 0–0.2)
BASOPHILS NFR BLD: 0.6 % (ref 0–1.9)
BUN SERPL-MCNC: 19 MG/DL (ref 8–23)
CALCIUM SERPL-MCNC: 9.6 MG/DL (ref 8.7–10.5)
CHLORIDE SERPL-SCNC: 106 MMOL/L (ref 95–110)
CO2 SERPL-SCNC: 30 MMOL/L (ref 23–29)
CREAT SERPL-MCNC: 1.1 MG/DL (ref 0.5–1.4)
DIFFERENTIAL METHOD: ABNORMAL
DRUG STUDY SPECIMEN TYPE: NORMAL
DRUG STUDY TEST NAME: NORMAL
DRUG STUDY TEST RESULT: NORMAL
EOSINOPHIL # BLD AUTO: 0.6 K/UL (ref 0–0.5)
EOSINOPHIL NFR BLD: 8.4 % (ref 0–8)
ERYTHROCYTE [DISTWIDTH] IN BLOOD BY AUTOMATED COUNT: 13.3 % (ref 11.5–14.5)
EST. GFR  (AFRICAN AMERICAN): >60 ML/MIN/1.73 M^2
EST. GFR  (NON AFRICAN AMERICAN): 54.3 ML/MIN/1.73 M^2
ESTIMATED AVG GLUCOSE: 137 MG/DL (ref 68–131)
GLUCOSE SERPL-MCNC: 154 MG/DL (ref 70–110)
HBA1C MFR BLD HPLC: 6.4 % (ref 4–5.6)
HCT VFR BLD AUTO: 36.7 % (ref 37–48.5)
HGB BLD-MCNC: 11.4 G/DL (ref 12–16)
IMM GRANULOCYTES # BLD AUTO: 0.05 K/UL (ref 0–0.04)
IMM GRANULOCYTES NFR BLD AUTO: 0.7 % (ref 0–0.5)
LYMPHOCYTES # BLD AUTO: 0.6 K/UL (ref 1–4.8)
LYMPHOCYTES NFR BLD: 7.9 % (ref 18–48)
MAGNESIUM SERPL-MCNC: 1.5 MG/DL (ref 1.6–2.6)
MCH RBC QN AUTO: 29.8 PG (ref 27–31)
MCHC RBC AUTO-ENTMCNC: 31.1 G/DL (ref 32–36)
MCV RBC AUTO: 96 FL (ref 82–98)
MONOCYTES # BLD AUTO: 0.5 K/UL (ref 0.3–1)
MONOCYTES NFR BLD: 6.8 % (ref 4–15)
NEUTROPHILS # BLD AUTO: 5.5 K/UL (ref 1.8–7.7)
NEUTROPHILS NFR BLD: 75.6 % (ref 38–73)
NRBC BLD-RTO: 0 /100 WBC
PHOSPHATE SERPL-MCNC: 2.8 MG/DL (ref 2.7–4.5)
PLATELET # BLD AUTO: 205 K/UL (ref 150–350)
PMV BLD AUTO: 9.7 FL (ref 9.2–12.9)
POTASSIUM SERPL-SCNC: 4.8 MMOL/L (ref 3.5–5.1)
RBC # BLD AUTO: 3.83 M/UL (ref 4–5.4)
SODIUM SERPL-SCNC: 144 MMOL/L (ref 136–145)
TACROLIMUS BLD-MCNC: 10.2 NG/ML (ref 5–15)
WBC # BLD AUTO: 7.23 K/UL (ref 3.9–12.7)

## 2020-01-29 PROCEDURE — 85025 COMPLETE CBC W/AUTO DIFF WBC: CPT

## 2020-01-29 PROCEDURE — 80197 ASSAY OF TACROLIMUS: CPT

## 2020-01-29 PROCEDURE — 99000 SPECIMEN HANDLING OFFICE-LAB: CPT

## 2020-01-29 PROCEDURE — 83036 HEMOGLOBIN GLYCOSYLATED A1C: CPT

## 2020-01-29 PROCEDURE — 36415 COLL VENOUS BLD VENIPUNCTURE: CPT

## 2020-01-29 PROCEDURE — 83735 ASSAY OF MAGNESIUM: CPT

## 2020-01-29 PROCEDURE — 80069 RENAL FUNCTION PANEL: CPT

## 2020-01-29 RX ORDER — TACROLIMUS 0.5 MG/1
1.5 CAPSULE ORAL EVERY 12 HOURS
Qty: 180 CAPSULE | Refills: 11 | Status: SHIPPED | OUTPATIENT
Start: 2020-01-29 | End: 2020-01-30 | Stop reason: SDUPTHER

## 2020-01-29 NOTE — PROGRESS NOTES
[facility] CMV Prevention (Kidney Transplant)  Protocol: [id number]  IRB# 2017.512  PI: Nicholas GUAN)  Site: [id number]     Subject #[id number]     Visit 19 / Week 52     Date of Visit: 29Jan2020     Patient arrived at the clinic to complete her V19/WK52 visit.  Protocol labs were drawn at 0927 and processed/shipped as per protocol.  , Evette Yoder, conducted the visit in its entirety with Sharri Sands present.  Epic was reviewed prior to study visit. Concomitant medication changes include starting of Protonix and changing from Cellcept to Myfortic.  No new AE's/SAEs or changes in his duarte status from last visit.  No hospitalizations, recent fever, diarrhea or cold like symptoms.  An end date was added to the AE of Parvo B19 infection.  The following AEs are ongoing at end of study: diarrhea, leg weakness, mid right back pain, rand recurrent hypomagnesemia.  Systolic murmur and chronic fatigue were added to medical history.      Vitals:    01/29/20 0950   BP: 139/73   Pulse: 81   Resp: 18   Temp: 98.6 °F (37 °C)       Current Outpatient Medications:     blood sugar diagnostic (BLOOD GLUCOSE TEST) Strp, Checks her bg 4 times a day   e11.9, Disp: 400 strip, Rfl: 4    flash glucose sensor (FREESTYLE DAKOTAH 14 DAY SENSOR) Kit, Uses 2 kit monthly, Disp: 6 kit, Rfl: 3    HYDROcodone-acetaminophen (NORCO) 7.5-325 mg per tablet, Take 1 tablet by mouth every 6 (six) hours as needed for Pain., Disp: , Rfl:     insulin regular hum U-500 conc (HUMULIN R U-500, CONC, KWIKPEN) 500 unit/mL (3 mL) InPn, Take 65 units breakfast, 55 units lunch and dinner, Disp: 3 Syringe, Rfl: 12    levothyroxine (SYNTHROID) 75 MCG tablet, Take 1 tablet (75 mcg total) by mouth once daily., Disp: 90 tablet, Rfl: 3    LORazepam (ATIVAN) 1 MG tablet, Take 1 mg by mouth every evening., Disp: , Rfl:     magnesium oxide 500 mg Tab, Take 500 mg by mouth once daily., Disp: , Rfl: 0    metFORMIN (GLUCOPHAGE-XR) 500 MG 24 hr tablet,  Take 1 tablet (500 mg total) by mouth 2 (two) times daily with meals., Disp: 180 tablet, Rfl: 3    multivitamin (THERAGRAN) tablet, Take 1 tablet by mouth once daily., Disp: , Rfl:     mycophenolate (MYFORTIC) 180 MG TbEC, Take 2 tablets (360 mg total) by mouth 2 (two) times daily., Disp: 120 tablet, Rfl: 11    pantoprazole (PROTONIX) 20 MG tablet, Take 1 tablet (20 mg total) by mouth once daily., Disp: 30 tablet, Rfl: 11    rosuvastatin (CRESTOR) 10 MG tablet, Take 1 tablet (10 mg total) by mouth every evening., Disp: 90 tablet, Rfl: 3    tacrolimus (PROGRAF) 0.5 MG Cap, Take 1 capsule (0.5 mg total) by mouth every evening. Total 2mg in AM and 1.5mg in PM Z94.0, Disp: 30 capsule, Rfl: 11    tacrolimus (PROGRAF) 1 MG Cap, Take 2 capsules (2 mg total) by mouth every morning AND 1 capsule (1 mg total) every evening. Total 2mg in AM and 1.5mg in PM. Z94.0., Disp: 90 capsule, Rfl: 11    vilazodone (VIIBRYD) 40 mg Tab tablet, Take 1 tablet (40 mg total) by mouth once daily., Disp: 30 tablet, Rfl: 4    CMV Disease Assessment, Health Outcomes Assessment, and Renal Transplant Outcomes sections were completed.  Patient completed the questionnaires on the sponsor provided iPad.        As this was the last study visit, the patient was thanked for her participation in this study.

## 2020-01-30 ENCOUNTER — PATIENT MESSAGE (OUTPATIENT)
Dept: PRIMARY CARE CLINIC | Facility: CLINIC | Age: 60
End: 2020-01-30

## 2020-01-30 LAB — ALLOSURE: NORMAL

## 2020-01-30 RX ORDER — TACROLIMUS 0.5 MG/1
1.5 CAPSULE ORAL EVERY 12 HOURS
Qty: 180 CAPSULE | Refills: 11 | Status: SHIPPED | OUTPATIENT
Start: 2020-01-30 | End: 2020-03-06 | Stop reason: SDUPTHER

## 2020-01-31 NOTE — PROGRESS NOTES
Primary Care Provider Appointment    Subjective:      Patient ID: Andra Newell is a 59 y.o. female with history of HTN, HLD, T2DM c/b ESRD s/p living L kidney transplant 1/14/2019, On immunosupressant, DJD, Asthma, hypothyroidism, HPT    Chief Complaint: Follow-up    Accompanied by friend. Pt had appt with pain mgmt, cervical and lumbar MRI completed, has f/u appt in 2 weeks to review imaging and discuss treatment options. Pt states she wants to find a solution that will resolve pain and does not want to be on chronic pain medication. Long discussion with pt regarding realistic pain control goal. Pt reports when pain 3/10, she is still able to have good qualify of life. Discussed diet for inflammation control and, weight loss, and increased exercise to help pain control.       Reflux improving on protonix.       BS log reviewed on [employer] tika via pt's phone. Pt reports she had to self-titrate insulin due to hyperglycemia >200 during the day, also expericising hypoglycemia (<50) 40% of the time between 12am-3pm. Had 4 episodes in the last 7 days. U500 kwikpen was changed to 120 units with breakfast, 20 units with dinner by endocrine.             Followed by:  Optometrist: Dr. Arias. Recently had annual exam, records requested.   Endocrinology: Mana Webber NP. Last seen 1/16/20. Change U500 kwikpen 120 breakfast, 20 u dinner  - needs some overnight coverage. U500 acts as both a basal and prandial insulin and has  A 1/2 life similar to NPH. Log in 1 week or sooner for continued hypos  Infectious Disease: Dr. Cammie Kessler. Last seen 5/31/19. Resolution of UTI, diarrhea, and anemia after course of IVIg, withdrawal from letermovir trial, and decrease in immunosuppression.  Neurology: Dr. Ashish Walden. Last seen 7/29/19. F/u post renal transplant for neurogenic claudication secondary to lumbar stenosis. Has a left side achilles tendon rupture w/o repair. Her gait instability is a combination of both problems.  Advised to see back and spine surgery for planning for surgery. Referred to EMG/NCS  Prognostically. f/u with neurology in 3 months.  Pain Mgmt: Dr. Yonny Ferrer. Last seen 11/11/19.  s/p right L2-5 MB RFA on 10/29/19 and reports about 50% relief so far. If pain persist, may consider Caudal IVETH. F/u PRN   Transplant: Dr. Maureen Cabral. Last seen 10/28/19. resume CellCept with close monitoring of her white count.  Will start at low dose of 250 mg, 1 cap twice daily. Continue serial monitoring of parvovirus PCR to ensure counts do not worsen.   Urology: Dr. Jacqui Artis. Last seen 4/10/19. Followed for recurrent UTI and urgency. Urethroscopy:  Normal.  Cystoscopy:  Normal bladder mucosa, right dome implanted ureter noted with + efflux. RTC prn   Nephrology: Dr. Heaton: Last visit 05/2019, RTC 01/2020. Will request records.   Psychiatry: Dr. Sheth: Last seen 10/2019. No changes per pt. RTC in 3 mths.   Therapist: Mague Muniz. Sees weekly for CBT.       Past Surgical History:   Procedure Laterality Date    ACHILLES TENDON SURGERY Left May 2015    BACK SURGERY      CHOLECYSTECTOMY      COLONOSCOPY N/A 9/6/2017    Procedure: COLONOSCOPY;  Surgeon: Marisol Bryson MD;  [facility];  Service: Endoscopy;  Laterality: N/A; REPEAT IN 3 YEARS FOR SURVEILLANCE    COLONOSCOPY N/A 5/9/2019    Procedure: COLONOSCOPY;  Surgeon: Fady Ewing MD;  [facility]  Service: Endoscopy;  Laterality: N/A;    DIALYSIS FISTULA CREATION  12/2017    ESOPHAGOGASTRODUODENOSCOPY N/A 5/9/2019    Procedure: EGD (ESOPHAGOGASTRODUODENOSCOPY);  Surgeon: Fady Ewing MD;  Location: [facility] (2ND FLR);  Service: Endoscopy;  Laterality: N/A;    HEMORRHOID SURGERY      INJECTION OF ANESTHETIC AGENT AROUND MEDIAL BRANCH NERVES INNERVATING LUMBAR FACET JOINT Right 9/25/2019    Procedure: Block-nerve-medial branch-lumbar;  Surgeon: Yonny Ferrer MD;  [facility];  Service: Pain Management;  Laterality:  Right;  L2, 3, 4,5     INJECTION OF ANESTHETIC AGENT AROUND MEDIAL BRANCH NERVES INNERVATING LUMBAR FACET JOINT Right 10/16/2019    Procedure: Block-nerve-medial branch-lumbar;  Surgeon: Yonny Ferrer MD;  Location: [facility];  Service: Pain Management;  Laterality: Right;  L2, 3, 4,5     JOINT REPLACEMENT      left    KIDNEY TRANSPLANT N/A 1/14/2019    Procedure: TRANSPLANT, KIDNEY;  Surgeon: Roland Salazar MD;  [facility];  Service: Transplant;  Laterality: N/A;    KNEE SURGERY      RADIOFREQUENCY ABLATION OF LUMBAR MEDIAL BRANCH NERVE AT SINGLE LEVEL Right 10/29/2019    Procedure: Radiofrequency Ablation, Nerve, Spinal, Lumbar, Medial Branch, 1 Level;  Surgeon: Yonny Ferrer MD;  Location: [facility];  Service: Pain Management;  Laterality: Right;  L2, 3, 4, 5  burned at 80 degrees C X 60 seconds each site X 2    SHOULDER ARTHROSCOPY      SHOULDER SURGERY      UPPER GASTROINTESTINAL ENDOSCOPY  ~2013    Dr. Moralez       Past Medical History:   Diagnosis Date    Anemia     Anemia in chronic kidney disease, on chronic dialysis 7/12/2017    Anxiety and depression     Aplastic anemia with parvovirus B19 infection 5/27/2019    Arthritis     Asthma     allergic airway    CKD stage III (moderate) 2/18/2019    Colon polyp     Depression     Diabetes mellitus     Diverticulosis     Eosinophilia     ESRD (end stage renal disease)     stage V, due for living donor 9/2018    ESRD on dialysis     GERD (gastroesophageal reflux disease)     Gout     Gout     Hyperlipidemia     Hypertension     Hypertriglyceridemia without hypercholesterolemia 6/9/2019    Low back pain     Low HDL (under 40) 6/9/2019    MRSA carrier     Neutropenia, unspecified 5/8/2019    Obesity     Renal disorder     Rotator cuff syndrome--left     Thyroid disease     Ulnar neuropathy of right upper extremity     Uncontrolled type 2 diabetes mellitus with hyperglycemia        Social History     Socioeconomic History     Marital status: Significant Other     Spouse name: Any Alvarez    Number of children: 2    Years of education: Not on file    Highest education level: 12th grade   Occupational History    Occupation: retired     Comment:    Social Needs    Financial resource strain: Not hard at all    Food insecurity:     Worry: Never true     Inability: Never true    Transportation needs:     Medical: No     Non-medical: No   Tobacco Use    Smoking status: Never Smoker    Smokeless tobacco: Never Used   Substance and Sexual Activity    Alcohol use: No    Drug use: No    Sexual activity: Not Currently   Lifestyle    Physical activity:     Days per week: 0 days     Minutes per session: 0 min    Stress: Very much   Relationships    Social connections:     Talks on phone: Three times a week     Gets together: Once a week     Attends [employer] service: 1 to 4 times per year     Active member of club or organization: No     Attends meetings of clubs or organizations: Never     Relationship status: Living with partner   Other Topics Concern    Not on file   Social History Narrative     female    Disabled since 2015 due to DDD and severe osteoarthritis of knee and hips    Previously worked as  at [employer]    Lives in residential 1 story home with partner, Any.     Has 2 boys, 1 lives in [place], 1 in [place].         Holiness: Bahai    Hobbies: painting and crafts.         Review of Systems   Constitutional: Positive for fatigue. Negative for activity change, appetite change, fever and unexpected weight change.   HENT: Negative for congestion, hearing loss, sore throat, trouble swallowing and voice change.    Eyes: Negative for visual disturbance.   Respiratory: Negative for apnea, cough, chest tightness, shortness of breath and wheezing.    Cardiovascular: Negative for chest pain, palpitations and leg swelling.   Gastrointestinal: Negative for abdominal  "pain, blood in stool, constipation, diarrhea, nausea and vomiting.   Endocrine: Negative for cold intolerance, heat intolerance, polydipsia, polyphagia and polyuria.   Genitourinary: Negative for difficulty urinating, dysuria, enuresis, flank pain, frequency, hematuria, pelvic pain and urgency.   Musculoskeletal: Positive for back pain. Negative for arthralgias, gait problem, joint swelling, myalgias, neck pain and neck stiffness.   Skin: Negative for rash and wound.   Neurological: Negative for dizziness, tremors, syncope, weakness, light-headedness, numbness and headaches.   Hematological: Negative for adenopathy. Does not bruise/bleed easily.   Psychiatric/Behavioral: Negative for dysphoric mood and sleep disturbance. The patient is not nervous/anxious.        Objective:   /70 (BP Location: Right arm, Patient Position: Sitting, BP Method: Large (Manual))   Pulse 80   Temp 98.1 °F (36.7 °C) (Oral)   Ht 5' 7" (1.702 m)   Wt 101.3 kg (223 lb 5.2 oz)   LMP 02/28/2012   SpO2 96%   BMI 34.98 kg/m²     Physical Exam   Constitutional: She is oriented to person, place, and time. She appears well-developed and well-nourished. No distress.   HENT:   Head: Normocephalic and atraumatic.   Cardiovascular: Normal rate and regular rhythm. Exam reveals no gallop and no friction rub.   Murmur heard.   Systolic murmur is present with a grade of 1/6.  Pulmonary/Chest: Effort normal and breath sounds normal. No respiratory distress. She has no wheezes. She exhibits no tenderness.   Abdominal: Soft. Bowel sounds are normal. She exhibits no distension and no mass. There is no tenderness. There is no rebound and no guarding.   Musculoskeletal: Normal range of motion. She exhibits no edema, tenderness or deformity.   Neurological: She is alert and oriented to person, place, and time.   Skin: Skin is warm and dry. She is not diaphoretic.   Psychiatric: She has a normal mood and affect. Her behavior is normal. Judgment and " thought content normal.   Nursing note and vitals reviewed.          Lab Results   Component Value Date    WBC 7.23 01/29/2020    HGB 11.4 (L) 01/29/2020    HCT 36.7 (L) 01/29/2020     01/29/2020    CHOL 138 11/21/2019    TRIG 148 11/21/2019    HDL 36 (L) 11/21/2019    ALT 27 10/14/2019    AST 24 10/14/2019     01/29/2020    K 4.8 01/29/2020     01/29/2020    CREATININE 1.1 01/29/2020    BUN 19 01/29/2020    CO2 30 (H) 01/29/2020    TSH 1.026 10/02/2019    INR 1.1 05/07/2019    HGBA1C 6.4 (H) 01/29/2020       Current Outpatient Medications on File Prior to Visit   Medication Sig Dispense Refill    blood sugar diagnostic (BLOOD GLUCOSE TEST) Strp Checks her bg 4 times a day   e11.9 400 strip 4    flash glucose sensor ([employer] DAKOTAH 14 DAY SENSOR) Kit Uses 2 kit monthly 6 kit 3    HYDROcodone-acetaminophen (NORCO) 7.5-325 mg per tablet Take 1 tablet by mouth every 6 (six) hours as needed for Pain.      insulin regular hum U-500 conc (HUMULIN R U-500, CONC, KWIKPEN) 500 unit/mL (3 mL) InPn Take 65 units breakfast, 55 units lunch and dinner 3 Syringe 12    levothyroxine (SYNTHROID) 75 MCG tablet Take 1 tablet (75 mcg total) by mouth once daily. 90 tablet 3    LORazepam (ATIVAN) 1 MG tablet Take 1 mg by mouth every evening.      magnesium oxide 500 mg Tab Take 500 mg by mouth once daily.  0    metFORMIN (GLUCOPHAGE-XR) 500 MG 24 hr tablet Take 1 tablet (500 mg total) by mouth 2 (two) times daily with meals. 180 tablet 3    multivitamin (THERAGRAN) tablet Take 1 tablet by mouth once daily.      mycophenolate (MYFORTIC) 180 MG TbEC Take 2 tablets (360 mg total) by mouth 2 (two) times daily. 120 tablet 11    pantoprazole (PROTONIX) 20 MG tablet Take 1 tablet (20 mg total) by mouth once daily. 30 tablet 11    rosuvastatin (CRESTOR) 10 MG tablet Take 1 tablet (10 mg total) by mouth every evening. 90 tablet 3    tacrolimus (PROGRAF) 0.5 MG Cap Take 3 capsules (1.5 mg total) by mouth every 12  (twelve) hours. 180 capsule 11    vilazodone (VIIBRYD) 40 mg Tab tablet Take 1 tablet (40 mg total) by mouth once daily. 30 tablet 4     Current Facility-Administered Medications on File Prior to Visit   Medication Dose Route Frequency Provider Last Rate Last Dose    sodium chloride 0.9% bolus 2,000 mL  2,000 mL Intravenous Once Maureen Cabral MD                 Assessment:   59 y.o. female with multiple co-morbid illnesses here to continue chronic care management.     Plan:     Problem List Items Addressed This Visit        Neuro    Lumbar stenosis with neurogenic claudication     Followed by neuro and neurosurgery  - Was seen by neurosurgery 12/11/2019, seen by pain mgmt 01/2020  - pending f/u with pain mgmt to discuss cervical and lumbar MRI  Recommended continued conservative management given that she has no neurologic deficit and surgical intervention may not help her axial back pain.   - pain goal 3/10, today 8/10.  - start aqua therapy              Endocrine    Uncontrolled type 2 diabetes mellitus with diabetic nephropathy, with long-term current use of insulin     Followed by endocrinology  - A1c down from 8.5 to 6.4  - hypoglycemic episodes frequent, 40%  - On  U500 kwikpen 65 units TID with meals  - Now has Cody, checking bs 4x daily. Last 30 days fbg 100-175, ppg 101-265.   - Of note, 16 episodes of hypoglycemia between 12am-3am.   - diet reviewed,  pt encourage to keep lunch when eating out to <600 calories.   - will decrease before dinner insulin dose to 60 units, cont 65 unit with breakfast and lunch.   - pt to call if bs <70.            Secondary hyperparathyroidism of renal origin     PTH 83  Hx of renal transplant, ckd 3a  Calcium 9.6  Vitamin d 27, instruct to start vitamin D3 1000 IU daily   Will cont to monitor          Severe obesity (BMI 35.0-35.9 with comorbidity)     Weight is trending down  Pt eats well balanced and healthy breakfast and dinner  Eats out almost daily for lunch  (rosangela, mexican, cane's), now has cut down to 1/2 portion   Encourage to restart aqua therapy for exercise                Health Maintenance       Date Due Completion Date    Shingles Vaccine (1 of 2) 02/15/2008 ---    Hemoglobin A1c 01/02/2020 10/2/2019    Eye Exam 09/17/2020 9/17/2019    Lipid Panel 11/21/2020 11/21/2019    Foot Exam 12/11/2020 12/11/2019    Override on 10/2/2019: Done    Mammogram 12/03/2021 12/3/2019    Pap Smear with HPV Cotest 09/04/2023 9/4/2018    Pneumococcal Vaccine (Highest Risk) (3 of 3 - PPSV23) 04/30/2024 4/30/2019    Colonoscopy 05/09/2024 5/9/2019    TETANUS VACCINE 07/12/2027 7/12/2017          Follow up in about 4 weeks (around 3/3/2020). Total face-to-face time was 60 min, 50% of this was spent on counseling and coordination of care.     Gay Tong, MARCYP-C  Family Medicine  [facility] [place]

## 2020-01-31 NOTE — TELEPHONE ENCOUNTER
Left detailed VM regarding Andra's surgery date of 1/14/19. Explained that date was chosen by the donor, not the transplant team, no information on why that date was chosen can legally be given as it is a HIPPA law violation. Transplant office # left on VM to return call at her convenience        ----- Message from Landy Foreman sent at 10/24/2018  4:31 PM CDT -----  Contact: Any Drew   Calling to speak with Yuly regarding tentative surgery date for pt    Pls contact Any @[phone]     [id number] Comprehensive

## 2020-02-04 ENCOUNTER — OFFICE VISIT (OUTPATIENT)
Dept: PRIMARY CARE CLINIC | Facility: CLINIC | Age: 60
End: 2020-02-04
Payer: MEDICARE

## 2020-02-04 VITALS
BODY MASS INDEX: 35.05 KG/M2 | WEIGHT: 223.31 LBS | SYSTOLIC BLOOD PRESSURE: 138 MMHG | TEMPERATURE: 98 F | OXYGEN SATURATION: 96 % | HEART RATE: 80 BPM | HEIGHT: 67 IN | DIASTOLIC BLOOD PRESSURE: 70 MMHG

## 2020-02-04 DIAGNOSIS — N25.81 SECONDARY HYPERPARATHYROIDISM OF RENAL ORIGIN: ICD-10-CM

## 2020-02-04 DIAGNOSIS — E66.01 SEVERE OBESITY (BMI 35.0-35.9 WITH COMORBIDITY): ICD-10-CM

## 2020-02-04 DIAGNOSIS — M48.062 LUMBAR STENOSIS WITH NEUROGENIC CLAUDICATION: ICD-10-CM

## 2020-02-04 PROCEDURE — 99215 PR OFFICE/OUTPT VISIT, EST, LEVL V, 40-54 MIN: ICD-10-PCS | Mod: S$GLB,,, | Performed by: NURSE PRACTITIONER

## 2020-02-04 PROCEDURE — 3075F SYST BP GE 130 - 139MM HG: CPT | Mod: CPTII,S$GLB,, | Performed by: NURSE PRACTITIONER

## 2020-02-04 PROCEDURE — 3008F PR BODY MASS INDEX (BMI) DOCUMENTED: ICD-10-PCS | Mod: CPTII,S$GLB,, | Performed by: NURSE PRACTITIONER

## 2020-02-04 PROCEDURE — 99215 OFFICE O/P EST HI 40 MIN: CPT | Mod: S$GLB,,, | Performed by: NURSE PRACTITIONER

## 2020-02-04 PROCEDURE — 3078F PR MOST RECENT DIASTOLIC BLOOD PRESSURE < 80 MM HG: ICD-10-PCS | Mod: CPTII,S$GLB,, | Performed by: NURSE PRACTITIONER

## 2020-02-04 PROCEDURE — 3075F PR MOST RECENT SYSTOLIC BLOOD PRESS GE 130-139MM HG: ICD-10-PCS | Mod: CPTII,S$GLB,, | Performed by: NURSE PRACTITIONER

## 2020-02-04 PROCEDURE — 3078F DIAST BP <80 MM HG: CPT | Mod: CPTII,S$GLB,, | Performed by: NURSE PRACTITIONER

## 2020-02-04 PROCEDURE — 3044F PR MOST RECENT HEMOGLOBIN A1C LEVEL <7.0%: ICD-10-PCS | Mod: CPTII,S$GLB,, | Performed by: NURSE PRACTITIONER

## 2020-02-04 PROCEDURE — 3044F HG A1C LEVEL LT 7.0%: CPT | Mod: CPTII,S$GLB,, | Performed by: NURSE PRACTITIONER

## 2020-02-04 PROCEDURE — 3008F BODY MASS INDEX DOCD: CPT | Mod: CPTII,S$GLB,, | Performed by: NURSE PRACTITIONER

## 2020-02-04 NOTE — ASSESSMENT & PLAN NOTE
Followed by endocrinology  - A1c down from 8.5 to 6.4  - hypoglycemic episodes frequent, 40%  - On  U500 kwikpen 65 units TID with meals  - Now has Cody, checking bs 4x daily. Last 30 days fbg 100-175, ppg 101-265.   - Of note, 16 episodes of hypoglycemia between 12am-3am.   - diet reviewed,  pt encourage to keep lunch when eating out to <600 calories.   - will decrease before dinner insulin dose to 60 units, cont 65 unit with breakfast and lunch.   - pt to call if bs <70.

## 2020-02-04 NOTE — ASSESSMENT & PLAN NOTE
Weight is trending down  Pt eats well balanced and healthy breakfast and dinner  Eats out almost daily for lunch (rosangela, mexican, cane's), now has cut down to 1/2 portion   Encourage to restart aqua therapy for exercise

## 2020-02-04 NOTE — ASSESSMENT & PLAN NOTE
Followed by neuro and neurosurgery  - Was seen by neurosurgery 12/11/2019, seen by pain mgmt 01/2020  - pending f/u with pain mgmt to discuss cervical and lumbar MRI  Recommended continued conservative management given that she has no neurologic deficit and surgical intervention may not help her axial back pain.   - pain goal 3/10, today 8/10.  - start aqua therapy

## 2020-02-04 NOTE — ASSESSMENT & PLAN NOTE
PTH 83  Hx of renal transplant, ckd 3a  Calcium 9.6  Vitamin d 27, instruct to start vitamin D3 1000 IU daily   Will cont to monitor

## 2020-02-08 ENCOUNTER — PATIENT MESSAGE (OUTPATIENT)
Dept: PRIMARY CARE CLINIC | Facility: CLINIC | Age: 60
End: 2020-02-08

## 2020-02-10 ENCOUNTER — PATIENT MESSAGE (OUTPATIENT)
Dept: TRANSPLANT | Facility: CLINIC | Age: 60
End: 2020-02-10

## 2020-02-12 ENCOUNTER — PATIENT MESSAGE (OUTPATIENT)
Dept: PRIMARY CARE CLINIC | Facility: CLINIC | Age: 60
End: 2020-02-12

## 2020-02-17 ENCOUNTER — PATIENT MESSAGE (OUTPATIENT)
Dept: PRIMARY CARE CLINIC | Facility: CLINIC | Age: 60
End: 2020-02-17

## 2020-02-18 ENCOUNTER — LAB VISIT (OUTPATIENT)
Dept: LAB | Facility: HOSPITAL | Age: 60
End: 2020-02-18
Attending: INTERNAL MEDICINE
Payer: MEDICARE

## 2020-02-18 DIAGNOSIS — Z94.0 KIDNEY REPLACED BY TRANSPLANT: ICD-10-CM

## 2020-02-18 LAB
ALBUMIN SERPL BCP-MCNC: 3.8 G/DL (ref 3.5–5.2)
ANION GAP SERPL CALC-SCNC: 11 MMOL/L (ref 8–16)
BASOPHILS # BLD AUTO: 0.04 K/UL (ref 0–0.2)
BASOPHILS NFR BLD: 0.6 % (ref 0–1.9)
BUN SERPL-MCNC: 23 MG/DL (ref 8–23)
CALCIUM SERPL-MCNC: 9.9 MG/DL (ref 8.7–10.5)
CHLORIDE SERPL-SCNC: 105 MMOL/L (ref 95–110)
CO2 SERPL-SCNC: 28 MMOL/L (ref 23–29)
CREAT SERPL-MCNC: 1.1 MG/DL (ref 0.5–1.4)
DIFFERENTIAL METHOD: ABNORMAL
EOSINOPHIL # BLD AUTO: 0.7 K/UL (ref 0–0.5)
EOSINOPHIL NFR BLD: 11.1 % (ref 0–8)
ERYTHROCYTE [DISTWIDTH] IN BLOOD BY AUTOMATED COUNT: 13.7 % (ref 11.5–14.5)
EST. GFR  (AFRICAN AMERICAN): >60 ML/MIN/1.73 M^2
EST. GFR  (NON AFRICAN AMERICAN): 53.9 ML/MIN/1.73 M^2
GLUCOSE SERPL-MCNC: 163 MG/DL (ref 70–110)
HCT VFR BLD AUTO: 38.8 % (ref 37–48.5)
HGB BLD-MCNC: 11.6 G/DL (ref 12–16)
IMM GRANULOCYTES # BLD AUTO: 0.03 K/UL (ref 0–0.04)
IMM GRANULOCYTES NFR BLD AUTO: 0.4 % (ref 0–0.5)
LYMPHOCYTES # BLD AUTO: 1 K/UL (ref 1–4.8)
LYMPHOCYTES NFR BLD: 14.5 % (ref 18–48)
MAGNESIUM SERPL-MCNC: 1.4 MG/DL (ref 1.6–2.6)
MCH RBC QN AUTO: 29.2 PG (ref 27–31)
MCHC RBC AUTO-ENTMCNC: 29.9 G/DL (ref 32–36)
MCV RBC AUTO: 98 FL (ref 82–98)
MONOCYTES # BLD AUTO: 0.6 K/UL (ref 0.3–1)
MONOCYTES NFR BLD: 9.3 % (ref 4–15)
NEUTROPHILS # BLD AUTO: 4.3 K/UL (ref 1.8–7.7)
NEUTROPHILS NFR BLD: 64.1 % (ref 38–73)
NRBC BLD-RTO: 0 /100 WBC
PHOSPHATE SERPL-MCNC: 4.7 MG/DL (ref 2.7–4.5)
PLATELET # BLD AUTO: 203 K/UL (ref 150–350)
PMV BLD AUTO: 10.2 FL (ref 9.2–12.9)
POTASSIUM SERPL-SCNC: 5 MMOL/L (ref 3.5–5.1)
RBC # BLD AUTO: 3.97 M/UL (ref 4–5.4)
SODIUM SERPL-SCNC: 144 MMOL/L (ref 136–145)
WBC # BLD AUTO: 6.69 K/UL (ref 3.9–12.7)

## 2020-02-18 PROCEDURE — 85025 COMPLETE CBC W/AUTO DIFF WBC: CPT

## 2020-02-18 PROCEDURE — 80069 RENAL FUNCTION PANEL: CPT

## 2020-02-18 PROCEDURE — 80197 ASSAY OF TACROLIMUS: CPT

## 2020-02-18 PROCEDURE — 36415 COLL VENOUS BLD VENIPUNCTURE: CPT | Mod: PO

## 2020-02-18 PROCEDURE — 83735 ASSAY OF MAGNESIUM: CPT

## 2020-02-19 LAB — TACROLIMUS BLD-MCNC: 7 NG/ML (ref 5–15)

## 2020-02-25 ENCOUNTER — PATIENT MESSAGE (OUTPATIENT)
Dept: PRIMARY CARE CLINIC | Facility: CLINIC | Age: 60
End: 2020-02-25

## 2020-02-27 ENCOUNTER — TELEPHONE (OUTPATIENT)
Dept: PRIMARY CARE CLINIC | Facility: CLINIC | Age: 60
End: 2020-02-27

## 2020-02-28 ENCOUNTER — PATIENT MESSAGE (OUTPATIENT)
Dept: PRIMARY CARE CLINIC | Facility: CLINIC | Age: 60
End: 2020-02-28

## 2020-03-03 ENCOUNTER — TELEPHONE (OUTPATIENT)
Dept: PRIMARY CARE CLINIC | Facility: CLINIC | Age: 60
End: 2020-03-03

## 2020-03-03 NOTE — TELEPHONE ENCOUNTER
----- Message from Mel Vasquez sent at 3/3/2020 12:18 PM CST -----  Contact: Pt requesting to r/s missed appt via [facility]  Message     Appointment Request From: Andra Newell    With Provider: Gay Tong NP [[facility]]    Preferred Date Range: 3/10/2020 - 3/13/2020    Preferred Times: Any time    Reason for visit: Existing Patient    Comments:  Blood sugar had to cancel last appointment due to illness

## 2020-03-06 ENCOUNTER — PATIENT MESSAGE (OUTPATIENT)
Dept: TRANSPLANT | Facility: CLINIC | Age: 60
End: 2020-03-06

## 2020-03-09 ENCOUNTER — PATIENT MESSAGE (OUTPATIENT)
Dept: PRIMARY CARE CLINIC | Facility: CLINIC | Age: 60
End: 2020-03-09

## 2020-03-10 ENCOUNTER — OFFICE VISIT (OUTPATIENT)
Dept: PRIMARY CARE CLINIC | Facility: CLINIC | Age: 60
End: 2020-03-10
Payer: MEDICARE

## 2020-03-10 VITALS
BODY MASS INDEX: 34.95 KG/M2 | DIASTOLIC BLOOD PRESSURE: 80 MMHG | TEMPERATURE: 98 F | OXYGEN SATURATION: 97 % | SYSTOLIC BLOOD PRESSURE: 120 MMHG | HEIGHT: 67 IN | WEIGHT: 222.69 LBS | HEART RATE: 77 BPM

## 2020-03-10 DIAGNOSIS — E11.69 MIXED DIABETIC HYPERLIPIDEMIA ASSOCIATED WITH TYPE 2 DIABETES MELLITUS: ICD-10-CM

## 2020-03-10 DIAGNOSIS — M48.062 LUMBAR STENOSIS WITH NEUROGENIC CLAUDICATION: ICD-10-CM

## 2020-03-10 DIAGNOSIS — Z79.4 CONTROLLED TYPE 2 DIABETES MELLITUS WITH COMPLICATION, WITH LONG-TERM CURRENT USE OF INSULIN: ICD-10-CM

## 2020-03-10 DIAGNOSIS — K21.9 GASTROESOPHAGEAL REFLUX DISEASE WITHOUT ESOPHAGITIS: ICD-10-CM

## 2020-03-10 DIAGNOSIS — E03.9 ACQUIRED HYPOTHYROIDISM: ICD-10-CM

## 2020-03-10 DIAGNOSIS — E11.8 CONTROLLED TYPE 2 DIABETES MELLITUS WITH COMPLICATION, WITH LONG-TERM CURRENT USE OF INSULIN: ICD-10-CM

## 2020-03-10 DIAGNOSIS — D61.9 APLASTIC ANEMIA: ICD-10-CM

## 2020-03-10 DIAGNOSIS — E55.9 VITAMIN D DEFICIENCY: ICD-10-CM

## 2020-03-10 DIAGNOSIS — E78.2 MIXED DIABETIC HYPERLIPIDEMIA ASSOCIATED WITH TYPE 2 DIABETES MELLITUS: ICD-10-CM

## 2020-03-10 PROBLEM — E66.01 SEVERE OBESITY (BMI 35.0-35.9 WITH COMORBIDITY): Status: RESOLVED | Noted: 2020-02-04 | Resolved: 2020-03-10

## 2020-03-10 PROCEDURE — 99215 OFFICE O/P EST HI 40 MIN: CPT | Mod: S$GLB,,, | Performed by: NURSE PRACTITIONER

## 2020-03-10 PROCEDURE — 99215 PR OFFICE/OUTPT VISIT, EST, LEVL V, 40-54 MIN: ICD-10-PCS | Mod: S$GLB,,, | Performed by: NURSE PRACTITIONER

## 2020-03-10 RX ORDER — ROSUVASTATIN CALCIUM 10 MG/1
10 TABLET, COATED ORAL NIGHTLY
Qty: 90 TABLET | Refills: 3 | Status: ON HOLD | OUTPATIENT
Start: 2020-03-10 | End: 2022-08-31 | Stop reason: SDUPTHER

## 2020-03-10 RX ORDER — LEVOTHYROXINE SODIUM 75 UG/1
75 TABLET ORAL DAILY
Qty: 90 TABLET | Refills: 3 | Status: SHIPPED | OUTPATIENT
Start: 2020-03-10 | End: 2021-03-30 | Stop reason: SDUPTHER

## 2020-03-10 RX ORDER — PANTOPRAZOLE SODIUM 20 MG/1
20 TABLET, DELAYED RELEASE ORAL DAILY
Qty: 90 TABLET | Refills: 3 | Status: SHIPPED | OUTPATIENT
Start: 2020-03-10 | End: 2021-03-30 | Stop reason: SDUPTHER

## 2020-03-10 NOTE — ASSESSMENT & PLAN NOTE
Followed by endocrinology  - A1c down from 8.5 to 6.4, repeat in 1 month.   - hypoglycemic episodes resolved.   - On  U500 kwikpen 120 units with breakfast and 20 units with dinner.   - bs at goal, cont as now

## 2020-03-10 NOTE — PROGRESS NOTES
Primary Care Provider Appointment    Subjective:      Patient ID: Andra Newell is a 60 y.o. female. with 59 y.o. female with history of HTN, HLD, T2DM c/b ESRD s/p living L kidney transplant 1/14/2019, On immunosupressant, DJD, Asthma, hypothyroidism, HPT    Chief Complaint: Follow-up    Accompanied by friend. Pt had appt with pain mgmt, cervical and lumbar MRI completed, has f/u appt in 2 weeks to review imaging and discuss treatment options    BS log reviewed on [employer] tika via pt's phone. See images below. Taking 120 units in the morning and 20 units before dinner    Has labs due to transplant team in April, A1c scheduled for then.     Had epidural lumbar injection 2 weeks ago with Dr[facility] reports pain improved from 8 to 6. Has another injection scheduled in 1 week.     Followed by:  Optometrist: Dr. Arias. Recently had annual exam, records requested.   Endocrinology: Mana Webber NP. Last seen 1/16/20. Change U500 kwikpen 120 breakfast, 20 u dinner  - needs some overnight coverage. U500 acts as both a basal and prandial insulin and has  A 1/2 life similar to NPH. Log in 1 week or sooner for continued hypos  Infectious Disease: Dr. Cammie Kessler. Last seen 5/31/19. Resolution of UTI, diarrhea, and anemia after course of IVIg, withdrawal from letermovir trial, and decrease in immunosuppression.  Neurology: Dr. Ashish Walden. Last seen 7/29/19. F/u post renal transplant for neurogenic claudication secondary to lumbar stenosis. Has a left side achilles tendon rupture w/o repair. Her gait instability is a combination of both problems. Advised to see back and spine surgery for planning for surgery. Referred to EMG/NCS  Prognostically. f/u with neurology in 3 months.  Pain Mgmt: Dr. Yonny Ferrer. Last seen 11/11/19.  s/p right L2-5 MB RFA on 10/29/19 and reports about 50% relief so far. If pain persist, may consider Caudal IVETH. F/u PRN   Transplant: Dr. Maureen Cabral. Last seen  10/28/19. resume CellCept with close monitoring of her white count.  Will start at low dose of 250 mg, 1 cap twice daily. Continue serial monitoring of parvovirus PCR to ensure counts do not worsen.   Urology: Dr. Jacqui Artis. Last seen 4/10/19. Followed for recurrent UTI and urgency. Urethroscopy:  Normal.  Cystoscopy:  Normal bladder mucosa, right dome implanted ureter noted with + efflux. RTC prn   Nephrology: Dr. Heaton: Last visit 05/2019, RTC 01/2020. Will request records.   Psychiatry: Dr. Sheth: Last seen 10/2019. No changes per pt. RTC in 3 mths.   Therapist: Mague Muniz. Sees weekly for CBT.       Past Surgical History:   Procedure Laterality Date    ACHILLES TENDON SURGERY Left May 2015    BACK SURGERY      CHOLECYSTECTOMY      COLONOSCOPY N/A 9/6/2017    Procedure: COLONOSCOPY;  Surgeon: Marisol Bryson MD;  Location: [facility];  Service: Endoscopy;  Laterality: N/A; REPEAT IN 3 YEARS FOR SURVEILLANCE    COLONOSCOPY N/A 5/9/2019    Procedure: COLONOSCOPY;  Surgeon: Fady Ewing MD;  [facility]  Service: Endoscopy;  Laterality: N/A;    DIALYSIS FISTULA CREATION  12/2017    ESOPHAGOGASTRODUODENOSCOPY N/A 5/9/2019    Procedure: EGD (ESOPHAGOGASTRODUODENOSCOPY);  Surgeon: Fady Ewing MD;  Location: [facility] ([address] [place] [address]);  Service: Endoscopy;  Laterality: N/A;    HEMORRHOID SURGERY      INJECTION OF ANESTHETIC AGENT AROUND MEDIAL BRANCH NERVES INNERVATING LUMBAR FACET JOINT Right 9/25/2019    Procedure: Block-nerve-medial branch-lumbar;  Surgeon: Yonny Ferrer MD;  Location: [facility];  Service: Pain Management;  Laterality: Right;  L2, 3, 4,5     INJECTION OF ANESTHETIC AGENT AROUND MEDIAL BRANCH NERVES INNERVATING LUMBAR FACET JOINT Right 10/16/2019    Procedure: Block-nerve-medial branch-lumbar;  Surgeon: Yonny Ferrer MD;  Location: [facility] OR;  Service: Pain Management;  Laterality: Right;  L2, 3, 4,5     JOINT REPLACEMENT      left    KIDNEY TRANSPLANT N/A  1/14/2019    Procedure: TRANSPLANT, KIDNEY;  Surgeon: Roland Salazar MD;  [facility];  Service: Transplant;  Laterality: N/A;    KNEE SURGERY      RADIOFREQUENCY ABLATION OF LUMBAR MEDIAL BRANCH NERVE AT SINGLE LEVEL Right 10/29/2019    Procedure: Radiofrequency Ablation, Nerve, Spinal, Lumbar, Medial Branch, 1 Level;  Surgeon: Yonny Ferrer MD;  Location: [facility];  Service: Pain Management;  Laterality: Right;  L2, 3, 4, 5  burned at 80 degrees C X 60 seconds each site X 2    SHOULDER ARTHROSCOPY      SHOULDER SURGERY      UPPER GASTROINTESTINAL ENDOSCOPY  ~2013    Dr. Moralez       Past Medical History:   Diagnosis Date    Anemia     Anemia in chronic kidney disease, on chronic dialysis 7/12/2017    Anxiety and depression     Aplastic anemia with parvovirus B19 infection 5/27/2019    Arthritis     Asthma     allergic airway    CKD stage III (moderate) 2/18/2019    Colon polyp     Depression     Diabetes mellitus     Diverticulosis     Eosinophilia     ESRD (end stage renal disease)     stage V, due for living donor 9/2018    ESRD on dialysis     GERD (gastroesophageal reflux disease)     Gout     Gout     Hyperlipidemia     Hypertension     Hypertriglyceridemia without hypercholesterolemia 6/9/2019    Low back pain     Low HDL (under 40) 6/9/2019    MRSA carrier     Neutropenia, unspecified 5/8/2019    Obesity     Renal disorder     Rotator cuff syndrome--left     Thyroid disease     Ulnar neuropathy of right upper extremity     Uncontrolled type 2 diabetes mellitus with hyperglycemia        Social History     Socioeconomic History    Marital status: Significant Other     Spouse name: Any Alvarez    Number of children: 2    Years of education: Not on file    Highest education level: 12th grade   Occupational History    Occupation: retired     Comment:    Social Needs    Financial resource strain: Not hard at all    Food insecurity:      Worry: Never true     Inability: Never true    Transportation needs:     Medical: No     Non-medical: No   Tobacco Use    Smoking status: Never Smoker    Smokeless tobacco: Never Used   Substance and Sexual Activity    Alcohol use: No    Drug use: No    Sexual activity: Not Currently   Lifestyle    Physical activity:     Days per week: 0 days     Minutes per session: 0 min    Stress: Very much   Relationships    Social connections:     Talks on phone: Three times a week     Gets together: Once a week     Attends [employer] service: 1 to 4 times per year     Active member of club or organization: No     Attends meetings of clubs or organizations: Never     Relationship status: Living with partner   Other Topics Concern    Not on file   Social History Narrative     female    Disabled since 2015 due to DDD and severe osteoarthritis of knee and hips    Previously worked as  at [employer]    Lives in residential 1 story home with partner, Any.     Has 2 boys, 1 lives in [place], 1 in [place].         Sikhism: Jainism    Hobbies: painting and crafts.         Review of Systems   Constitutional: Positive for fatigue. Negative for activity change, appetite change, fever and unexpected weight change.   HENT: Negative for congestion, hearing loss, sore throat, trouble swallowing and voice change.    Eyes: Negative for visual disturbance.   Respiratory: Negative for apnea, cough, chest tightness, shortness of breath and wheezing.    Cardiovascular: Negative for chest pain, palpitations and leg swelling.   Gastrointestinal: Negative for abdominal pain, blood in stool, constipation, diarrhea, nausea and vomiting.   Genitourinary: Negative for difficulty urinating, dysuria, enuresis, flank pain, frequency, hematuria, pelvic pain and urgency.   Musculoskeletal: Positive for back pain. Negative for arthralgias, gait problem, joint swelling, myalgias, neck pain and neck stiffness.  "  Skin: Negative for rash and wound.   Neurological: Negative for dizziness, tremors, syncope, weakness, light-headedness, numbness and headaches.   Hematological: Does not bruise/bleed easily.   Psychiatric/Behavioral: Negative for dysphoric mood and sleep disturbance. The patient is not nervous/anxious.        Objective:   /80 (BP Location: Right arm, Patient Position: Sitting, BP Method: Medium (Manual))   Pulse 77   Temp 98.1 °F (36.7 °C) (Oral)   Ht 5' 7" (1.702 m)   Wt 101 kg (222 lb 10.6 oz)   LMP 02/28/2012   SpO2 97%   BMI 34.87 kg/m²     Physical Exam   Constitutional: She is oriented to person, place, and time. She appears well-developed and well-nourished. No distress.   HENT:   Head: Normocephalic and atraumatic.   Cardiovascular: Normal rate and regular rhythm. Exam reveals no gallop and no friction rub.   Murmur heard.   Systolic murmur is present with a grade of 1/6.  Pulmonary/Chest: Effort normal and breath sounds normal. No respiratory distress. She has no wheezes. She exhibits no tenderness.   Abdominal: Soft. Bowel sounds are normal. She exhibits no distension and no mass. There is no tenderness. There is no rebound and no guarding.   Musculoskeletal: Normal range of motion. She exhibits no edema, tenderness or deformity.   Neurological: She is alert and oriented to person, place, and time.   Skin: Skin is warm and dry. She is not diaphoretic.   Psychiatric: She has a normal mood and affect. Her behavior is normal. Judgment and thought content normal.   Nursing note and vitals reviewed.          Lab Results   Component Value Date    WBC 6.69 02/18/2020    HGB 11.6 (L) 02/18/2020    HCT 38.8 02/18/2020     02/18/2020    CHOL 138 11/21/2019    TRIG 148 11/21/2019    HDL 36 (L) 11/21/2019    ALT 27 10/14/2019    AST 24 10/14/2019     02/18/2020    K 5.0 02/18/2020     02/18/2020    CREATININE 1.1 02/18/2020    BUN 23 02/18/2020    CO2 28 02/18/2020    TSH 1.026 " 10/02/2019    INR 1.1 05/07/2019    HGBA1C 6.4 (H) 01/29/2020       Medication List with Changes/Refills   Current Medications    BLOOD SUGAR DIAGNOSTIC (BLOOD GLUCOSE TEST) STRP    Checks her bg 4 times a day   e11.9    FLASH GLUCOSE SENSOR (FREESTYLE DAKOTAH 14 DAY SENSOR) KIT    Uses 2 kit monthly    HYDROCODONE-ACETAMINOPHEN (NORCO) 7.5-325 MG PER TABLET    Take 1 tablet by mouth every 6 (six) hours as needed for Pain.    LORAZEPAM (ATIVAN) 1 MG TABLET    Take 1 mg by mouth every evening.    MAGNESIUM OXIDE 500 MG TAB    Take 500 mg by mouth once daily.    METFORMIN (GLUCOPHAGE-XR) 500 MG 24 HR TABLET    Take 1 tablet (500 mg total) by mouth 2 (two) times daily with meals.    MULTIVITAMIN (THERAGRAN) TABLET    Take 1 tablet by mouth once daily.    MYCOPHENOLATE (MYFORTIC) 180 MG TBEC    Take 2 tablets (360 mg total) by mouth 2 (two) times daily.    TACROLIMUS (PROGRAF) 0.5 MG CAP    Take 1 capsule (0.5 mg total) by mouth every 12 (twelve) hours. To take along with 1mg capsules for total dose of 1.5mg every 12 hours.    TACROLIMUS (PROGRAF) 1 MG CAP    Take 1 capsule (1 mg total) by mouth every 12 (twelve) hours. To take along with 0.5mg capsules for total dose of 1.5mg every 12 hours.    VILAZODONE (VIIBRYD) 40 MG TAB TABLET    Take 1 tablet (40 mg total) by mouth once daily.   Changed and/or Refilled Medications    Modified Medication Previous Medication    INSULIN REGULAR HUM U-500 CONC (HUMULIN R U-500, CONC, KWIKPEN) 500 UNIT/ML (3 ML) INPN insulin regular hum U-500 conc (HUMULIN R U-500, CONC, KWIKPEN) 500 unit/mL (3 mL) InPn       Take 120 units breakfast, 20 units with dinner    Take 65 units breakfast, 55 units lunch and dinner    LEVOTHYROXINE (SYNTHROID) 75 MCG TABLET levothyroxine (SYNTHROID) 75 MCG tablet       Take 1 tablet (75 mcg total) by mouth once daily.    Take 1 tablet (75 mcg total) by mouth once daily.    PANTOPRAZOLE (PROTONIX) 20 MG TABLET pantoprazole (PROTONIX) 20 MG tablet       Take 1  tablet (20 mg total) by mouth once daily.    Take 1 tablet (20 mg total) by mouth once daily.    ROSUVASTATIN (CRESTOR) 10 MG TABLET rosuvastatin (CRESTOR) 10 MG tablet       Take 1 tablet (10 mg total) by mouth every evening.    Take 1 tablet (10 mg total) by mouth every evening.                     Assessment:   60 y.o. female with multiple co-morbid illnesses here to continue chronic care management.     Plan:     Problem List Items Addressed This Visit        Neuro    Lumbar stenosis with neurogenic claudication     Followed by neuro and pain mgmt  - Recommend conservative management by neurosurgery given no neurologic deficit and surgical intervention may not help her axial back pain.   - Had 1st epidural steroid injection by pain mgmt, 2st injection scheduled for 1 week  - pain goal 3/10, today 6/10.   - encourage to start aqua therapy              Cardiac/Vascular    Mixed diabetic hyperlipidemia associated with type 2 diabetes mellitus     Lipids controlled on statin  - pt no longer eating friend foods, cooking in air fryer  - encourage to restart aqua therapy  - cont meds as now         Relevant Medications    rosuvastatin (CRESTOR) 10 MG tablet    insulin regular hum U-500 conc (HUMULIN R U-500, CONC, KWIKPEN) 500 unit/mL (3 mL) InPn       Oncology    Aplastic anemia with parvovirus B19 infection 4/2019     Followed by infectious dz  - H&H stable 11/38  - continue serial monitoring of parvovirus               Endocrine    Controlled type 2 diabetes mellitus with complication, with long-term current use of insulin - Primary     Followed by endocrinology  - A1c down from 8.5 to 6.4, repeat in 1 month.   - hypoglycemic episodes resolved.   - On  U500 kwikpen 120 units with breakfast and 20 units with dinner.   - bs at goal, cont as now         Relevant Medications    insulin regular hum U-500 conc (HUMULIN R U-500, CONC, KWIKPEN) 500 unit/mL (3 mL) In    Vitamin D deficiency     On ergo  Level 27 11/2019  -  repeat with April labs          Acquired hypothyroidism     Tsh/ free t4 1.02/0.95 at goal  - cont synthroid 75mcg daily   - repeat with April labs         Relevant Medications    levothyroxine (SYNTHROID) 75 MCG tablet       GI    Gastroesophageal reflux disease without esophagitis    Relevant Medications    pantoprazole (PROTONIX) 20 MG tablet          Health Maintenance       Date Due Completion Date    Shingles Vaccine (1 of 2) 02/15/2008 ---    Hemoglobin A1c 04/29/2020 1/29/2020    Eye Exam 09/17/2020 9/17/2019    Lipid Panel 11/21/2020 11/21/2019    Foot Exam 12/11/2020 12/11/2019    Override on 10/2/2019: Done    Mammogram 12/03/2021 12/3/2019    Pap Smear with HPV Cotest 09/04/2023 9/4/2018    Pneumococcal Vaccine (Highest Risk) (3 of 3 - PPSV23) 04/30/2024 4/30/2019    Colonoscopy 05/09/2024 5/9/2019    TETANUS VACCINE 07/12/2027 7/12/2017          Follow up in about 8 weeks (around 5/5/2020). Total face-to-face time was 40 min, greater than 50% of this was spent on counseling and coordination of care.       GHULAM Aceves-KAYE  Family Medicine  [facility] [place]

## 2020-03-10 NOTE — ASSESSMENT & PLAN NOTE
Followed by neuro and pain mgmt  - Recommend conservative management by neurosurgery given no neurologic deficit and surgical intervention may not help her axial back pain.   - Had 1st epidural steroid injection by pain mgmt, 2st injection scheduled for 1 week  - pain goal 3/10, today 6/10.   - encourage to start aqua therapy

## 2020-03-10 NOTE — ASSESSMENT & PLAN NOTE
Lipids controlled on statin  - pt no longer eating friend foods, cooking in air fryer  - encourage to restart aqua therapy  - cont meds as now

## 2020-03-29 ENCOUNTER — PATIENT MESSAGE (OUTPATIENT)
Dept: PRIMARY CARE CLINIC | Facility: CLINIC | Age: 60
End: 2020-03-29

## 2020-03-30 ENCOUNTER — TELEPHONE (OUTPATIENT)
Dept: PRIMARY CARE CLINIC | Facility: CLINIC | Age: 60
End: 2020-03-30

## 2020-03-30 NOTE — TELEPHONE ENCOUNTER
That pt had a prescription for Synthroid sent to her pharmacy on 3/10/2020, asked her to call if she had questions

## 2020-03-31 ENCOUNTER — TELEPHONE (OUTPATIENT)
Dept: PRIMARY CARE CLINIC | Facility: CLINIC | Age: 60
End: 2020-03-31

## 2020-03-31 ENCOUNTER — PATIENT MESSAGE (OUTPATIENT)
Dept: PRIMARY CARE CLINIC | Facility: CLINIC | Age: 60
End: 2020-03-31

## 2020-03-31 DIAGNOSIS — N30.90 CYSTITIS: Primary | ICD-10-CM

## 2020-03-31 RX ORDER — CIPROFLOXACIN 500 MG/1
500 TABLET ORAL EVERY 12 HOURS
Qty: 10 TABLET | Refills: 0 | Status: SHIPPED | OUTPATIENT
Start: 2020-03-31 | End: 2020-06-22 | Stop reason: ALTCHOICE

## 2020-04-01 ENCOUNTER — PATIENT MESSAGE (OUTPATIENT)
Dept: PRIMARY CARE CLINIC | Facility: CLINIC | Age: 60
End: 2020-04-01

## 2020-04-09 RX ORDER — INSULIN HUMAN 500 [IU]/ML
INJECTION, SOLUTION SUBCUTANEOUS
Qty: 12 ML | Refills: 11 | Status: SHIPPED | OUTPATIENT
Start: 2020-04-09 | End: 2020-06-22

## 2020-04-11 ENCOUNTER — PATIENT MESSAGE (OUTPATIENT)
Dept: TRANSPLANT | Facility: CLINIC | Age: 60
End: 2020-04-11

## 2020-04-11 ENCOUNTER — PATIENT MESSAGE (OUTPATIENT)
Dept: PRIMARY CARE CLINIC | Facility: CLINIC | Age: 60
End: 2020-04-11

## 2020-04-19 ENCOUNTER — PATIENT MESSAGE (OUTPATIENT)
Dept: PRIMARY CARE CLINIC | Facility: CLINIC | Age: 60
End: 2020-04-19

## 2020-05-04 ENCOUNTER — OFFICE VISIT (OUTPATIENT)
Dept: PRIMARY CARE CLINIC | Facility: CLINIC | Age: 60
End: 2020-05-04
Payer: MEDICARE

## 2020-05-04 DIAGNOSIS — Z79.4 CONTROLLED TYPE 2 DIABETES MELLITUS WITH COMPLICATION, WITH LONG-TERM CURRENT USE OF INSULIN: Primary | ICD-10-CM

## 2020-05-04 DIAGNOSIS — E11.8 CONTROLLED TYPE 2 DIABETES MELLITUS WITH COMPLICATION, WITH LONG-TERM CURRENT USE OF INSULIN: Primary | ICD-10-CM

## 2020-05-04 PROCEDURE — 99443 PR PHYSICIAN TELEPHONE EVALUATION 21-30 MIN: ICD-10-PCS | Mod: 95,,, | Performed by: NURSE PRACTITIONER

## 2020-05-04 PROCEDURE — 99443 PR PHYSICIAN TELEPHONE EVALUATION 21-30 MIN: CPT | Mod: 95,,, | Performed by: NURSE PRACTITIONER

## 2020-05-04 NOTE — PROGRESS NOTES
Established Patient - Audio Only Telehealth Visit     The patient location is: Pt's home  The chief complaint leading to consultation is: diarrhea, DM management   Visit type: Virtual visit with audio only (telephone)  Total time spent with patient: 25 minutes       The reason for the audio only service rather than synchronous audio and video virtual visit was related to technical difficulties or patient preference/necessity.     Each patient to whom I provide medical services by telemedicine is:  (1) informed of the relationship between the physician and patient and the respective role of any other health care provider with respect to management of the patient; and (2) notified that they may decline to receive medical services by telemedicine and may withdraw from such care at any time. Patient verbally consented to receive this service via voice-only telephone call.       HPI: Complains of diarrhea x 3 weeks. Diarrhea resolves with immodium, requiring medication every 3-4 days but then returns. Denies any other accompanying symptoms including fever, cough, abd pain, or blood in stool. Pt drinking plenty of fluids.     Recommend covid testing due to new onset of gi symptoms, pt declines and state she will wait another 2 weeks to see if diarrhea resolves. Pt is not taking any new medications or supplements.     Pt has been eating more due to having to stay home all day. fbg 157-296, ppg  (1 isolated incident of hypoglycemia, bg 72). She is taking Humulin R 130 at 10:30am and 30 at 5:30pm.  120am, 20am.      Assessment and plan:      Continue to monitor diarrhea and for symptoms of covid-19. Pt to call in 2 weeks to give update on diarrhea or sooner if symptoms worsens.  Continue adequate fluids.     Pt instructed to adhere to he usual diet and avoid eating if pt is not hungry. Opt for healthier snacks like fruits (apples and berries).  Will change insulin to 120 units at 10:30am and 40 units at 7:30pm.        This service was not originating from a related E/M service provided within the previous 7 days nor will  to an E/M service or procedure within the next 24 hours or my soonest available appointment.  Prevailing standard of care was able to be met in this audio-only visit.

## 2020-05-05 ENCOUNTER — PATIENT MESSAGE (OUTPATIENT)
Dept: ADMINISTRATIVE | Facility: HOSPITAL | Age: 60
End: 2020-05-05

## 2020-05-05 ENCOUNTER — PATIENT MESSAGE (OUTPATIENT)
Dept: TRANSPLANT | Facility: CLINIC | Age: 60
End: 2020-05-05

## 2020-05-06 ENCOUNTER — PATIENT MESSAGE (OUTPATIENT)
Dept: PRIMARY CARE CLINIC | Facility: CLINIC | Age: 60
End: 2020-05-06

## 2020-05-12 ENCOUNTER — LAB VISIT (OUTPATIENT)
Dept: LAB | Facility: HOSPITAL | Age: 60
End: 2020-05-12
Attending: INTERNAL MEDICINE
Payer: MEDICARE

## 2020-05-12 DIAGNOSIS — Z94.0 KIDNEY REPLACED BY TRANSPLANT: ICD-10-CM

## 2020-05-12 DIAGNOSIS — E03.9 ACQUIRED HYPOTHYROIDISM: ICD-10-CM

## 2020-05-12 LAB
ALBUMIN SERPL BCP-MCNC: 3.7 G/DL (ref 3.5–5.2)
ANION GAP SERPL CALC-SCNC: 8 MMOL/L (ref 8–16)
BASOPHILS # BLD AUTO: 0.03 K/UL (ref 0–0.2)
BASOPHILS NFR BLD: 0.5 % (ref 0–1.9)
BUN SERPL-MCNC: 13 MG/DL (ref 8–23)
CALCIUM SERPL-MCNC: 9.4 MG/DL (ref 8.7–10.5)
CHLORIDE SERPL-SCNC: 108 MMOL/L (ref 95–110)
CO2 SERPL-SCNC: 27 MMOL/L (ref 23–29)
CREAT SERPL-MCNC: 1 MG/DL (ref 0.5–1.4)
DIFFERENTIAL METHOD: ABNORMAL
EOSINOPHIL # BLD AUTO: 0.6 K/UL (ref 0–0.5)
EOSINOPHIL NFR BLD: 10.3 % (ref 0–8)
ERYTHROCYTE [DISTWIDTH] IN BLOOD BY AUTOMATED COUNT: 13.4 % (ref 11.5–14.5)
EST. GFR  (AFRICAN AMERICAN): >60 ML/MIN/1.73 M^2
EST. GFR  (NON AFRICAN AMERICAN): >60 ML/MIN/1.73 M^2
ESTIMATED AVG GLUCOSE: 154 MG/DL (ref 68–131)
GLUCOSE SERPL-MCNC: 163 MG/DL (ref 70–110)
HBA1C MFR BLD HPLC: 7 % (ref 4–5.6)
HCT VFR BLD AUTO: 38.9 % (ref 37–48.5)
HGB BLD-MCNC: 11.8 G/DL (ref 12–16)
IMM GRANULOCYTES # BLD AUTO: 0.02 K/UL (ref 0–0.04)
IMM GRANULOCYTES NFR BLD AUTO: 0.3 % (ref 0–0.5)
LYMPHOCYTES # BLD AUTO: 0.9 K/UL (ref 1–4.8)
LYMPHOCYTES NFR BLD: 15.3 % (ref 18–48)
MAGNESIUM SERPL-MCNC: 1.5 MG/DL (ref 1.6–2.6)
MCH RBC QN AUTO: 29.1 PG (ref 27–31)
MCHC RBC AUTO-ENTMCNC: 30.3 G/DL (ref 32–36)
MCV RBC AUTO: 96 FL (ref 82–98)
MONOCYTES # BLD AUTO: 0.5 K/UL (ref 0.3–1)
MONOCYTES NFR BLD: 8.2 % (ref 4–15)
NEUTROPHILS # BLD AUTO: 3.8 K/UL (ref 1.8–7.7)
NEUTROPHILS NFR BLD: 65.4 % (ref 38–73)
NRBC BLD-RTO: 0 /100 WBC
PHOSPHATE SERPL-MCNC: 3.7 MG/DL (ref 2.7–4.5)
PLATELET # BLD AUTO: 207 K/UL (ref 150–350)
PMV BLD AUTO: 10 FL (ref 9.2–12.9)
POTASSIUM SERPL-SCNC: 4.8 MMOL/L (ref 3.5–5.1)
RBC # BLD AUTO: 4.06 M/UL (ref 4–5.4)
SODIUM SERPL-SCNC: 143 MMOL/L (ref 136–145)
T4 FREE SERPL-MCNC: 0.86 NG/DL (ref 0.71–1.51)
TSH SERPL DL<=0.005 MIU/L-ACNC: 0.78 UIU/ML (ref 0.4–4)
WBC # BLD AUTO: 5.74 K/UL (ref 3.9–12.7)

## 2020-05-12 PROCEDURE — 36415 COLL VENOUS BLD VENIPUNCTURE: CPT | Mod: PO

## 2020-05-12 PROCEDURE — 84443 ASSAY THYROID STIM HORMONE: CPT

## 2020-05-12 PROCEDURE — 85025 COMPLETE CBC W/AUTO DIFF WBC: CPT

## 2020-05-12 PROCEDURE — 83036 HEMOGLOBIN GLYCOSYLATED A1C: CPT

## 2020-05-12 PROCEDURE — 84439 ASSAY OF FREE THYROXINE: CPT

## 2020-05-12 PROCEDURE — 80197 ASSAY OF TACROLIMUS: CPT

## 2020-05-12 PROCEDURE — 80069 RENAL FUNCTION PANEL: CPT

## 2020-05-12 PROCEDURE — 83735 ASSAY OF MAGNESIUM: CPT

## 2020-05-13 LAB — TACROLIMUS BLD-MCNC: 7.6 NG/ML (ref 5–15)

## 2020-05-25 ENCOUNTER — TELEPHONE (OUTPATIENT)
Dept: PRIMARY CARE CLINIC | Facility: CLINIC | Age: 60
End: 2020-05-25

## 2020-05-25 NOTE — TELEPHONE ENCOUNTER
----- Message from Aster Carter sent at 5/25/2020  4:38 PM CDT -----  Contact: pt portal request   Appointment Request From: Andra Newell    With Provider: Gay Tong NP [[facility]]    Preferred Date Range: 5/26/2020 - 5/27/2020    Preferred Times: Any time    Reason for visit: Not feeling well    Comments:  My stomach issues

## 2020-05-29 NOTE — ASSESSMENT & PLAN NOTE
UA (-) UTI  Culture urine  Pyridium contraindicated     buttocks, abrasions on scalp s/p fall/hit his head)  · Current Nutrition Therapies:  · Oral Diet Orders:   General Diet  · Oral Diet intake: 26-50%(intake at meals currently variable)  · Anthropometric Measures:  · Ht: 5' 7\" (170.2 cm)   · Current Body Wt: 99 lb (44.9 kg)(5/29 bedwt)  · Admission Body Wt: 96 lb (43.5 kg)(5/28 bedwt)  · Usual Body Wt: (unable to accurately evaluate-Remote EMR x 4 yrs ago 126#)  · % Weight Change:  ,  unable to accurately evaluate recent changes (24% loss in last 4 yrs)  · Ideal Body Wt: 148 lb (67.1 kg), % Ideal Body 65%  · BMI Classification: BMI <18.5 Underweight    Nutrition Interventions:   Continue current diet, Start ONS  Continued Inpatient Monitoring, Education not appropriate at this time    Nutrition Evaluation:   · Evaluation: Goals set   · Goals: PO >75% at meal and ONS    · Monitoring: Meal Intake, Supplement Intake, Skin Integrity, Wound Healing, I&O, Mental Status/Confusion, Weight, Pertinent Labs, Monitor Bowel Function      Electronically signed by Barb Hernandez RD, CNSC, LD on 5/29/20 at 11:19 AM EDT    Contact Number: [phone]

## 2020-06-03 ENCOUNTER — LAB VISIT (OUTPATIENT)
Dept: LAB | Facility: HOSPITAL | Age: 60
End: 2020-06-03
Attending: INTERNAL MEDICINE
Payer: MEDICARE

## 2020-06-03 DIAGNOSIS — R19.7 DIARRHEA OF PRESUMED INFECTIOUS ORIGIN: Primary | ICD-10-CM

## 2020-06-03 PROCEDURE — 87045 FECES CULTURE AEROBIC BACT: CPT

## 2020-06-03 PROCEDURE — 87324 CLOSTRIDIUM AG IA: CPT

## 2020-06-03 PROCEDURE — 87427 SHIGA-LIKE TOXIN AG IA: CPT | Mod: 59

## 2020-06-03 PROCEDURE — 87046 STOOL CULTR AEROBIC BACT EA: CPT | Mod: 59

## 2020-06-03 PROCEDURE — 87449 NOS EACH ORGANISM AG IA: CPT

## 2020-06-05 LAB
C DIFF GDH STL QL: NEGATIVE
C DIFF TOX A+B STL QL IA: NEGATIVE

## 2020-06-06 LAB
E COLI SXT1 STL QL IA: NEGATIVE
E COLI SXT2 STL QL IA: NEGATIVE

## 2020-06-08 LAB — BACTERIA STL CULT: NORMAL

## 2020-06-09 DIAGNOSIS — Z94.0 KIDNEY REPLACED BY TRANSPLANT: Primary | ICD-10-CM

## 2020-06-15 ENCOUNTER — PATIENT MESSAGE (OUTPATIENT)
Dept: SPINE | Facility: CLINIC | Age: 60
End: 2020-06-15

## 2020-06-15 NOTE — TELEPHONE ENCOUNTER
Called patient and got voicemail, left her a message letting her know Maite Alfonso only treats the neck and spine. For problems with her blood sugar she would need to contact her PCP.

## 2020-06-18 ENCOUNTER — TELEPHONE (OUTPATIENT)
Dept: PRIMARY CARE CLINIC | Facility: CLINIC | Age: 60
End: 2020-06-18

## 2020-06-18 NOTE — TELEPHONE ENCOUNTER
Received a message about patient not getting a call back to make an appointment about her sugar levels. Pt has not returned any of our calls. Called patient to make an appointment, no answer and left a message.

## 2020-06-22 ENCOUNTER — HOSPITAL ENCOUNTER (OUTPATIENT)
Dept: RADIOLOGY | Facility: CLINIC | Age: 60
Discharge: HOME | End: 2020-06-22
Attending: NURSE PRACTITIONER
Payer: MEDICARE

## 2020-06-22 ENCOUNTER — OFFICE VISIT (OUTPATIENT)
Dept: PRIMARY CARE CLINIC | Facility: CLINIC | Age: 60
End: 2020-06-22
Payer: MEDICARE

## 2020-06-22 VITALS
WEIGHT: 221.56 LBS | SYSTOLIC BLOOD PRESSURE: 148 MMHG | DIASTOLIC BLOOD PRESSURE: 72 MMHG | TEMPERATURE: 100 F | HEART RATE: 100 BPM | OXYGEN SATURATION: 96 % | BODY MASS INDEX: 34.78 KG/M2 | HEIGHT: 67 IN

## 2020-06-22 DIAGNOSIS — I12.9 HYPERTENSION ASSOCIATED WITH STAGE 3 CHRONIC KIDNEY DISEASE DUE TO TYPE 2 DIABETES MELLITUS: ICD-10-CM

## 2020-06-22 DIAGNOSIS — E11.22 HYPERTENSION ASSOCIATED WITH STAGE 3 CHRONIC KIDNEY DISEASE DUE TO TYPE 2 DIABETES MELLITUS: ICD-10-CM

## 2020-06-22 DIAGNOSIS — E66.9 OBESITY (BMI 30-39.9): ICD-10-CM

## 2020-06-22 DIAGNOSIS — F33.0 MDD (MAJOR DEPRESSIVE DISORDER), RECURRENT EPISODE, MILD: ICD-10-CM

## 2020-06-22 DIAGNOSIS — E11.69 MIXED DIABETIC HYPERLIPIDEMIA ASSOCIATED WITH TYPE 2 DIABETES MELLITUS: ICD-10-CM

## 2020-06-22 DIAGNOSIS — Z79.4 CONTROLLED TYPE 2 DIABETES MELLITUS WITH COMPLICATION, WITH LONG-TERM CURRENT USE OF INSULIN: ICD-10-CM

## 2020-06-22 DIAGNOSIS — J45.20 MILD INTERMITTENT ASTHMA WITHOUT COMPLICATION: ICD-10-CM

## 2020-06-22 DIAGNOSIS — R10.9 ABDOMINAL PAIN DUE TO INJURY: ICD-10-CM

## 2020-06-22 DIAGNOSIS — E78.2 MIXED DIABETIC HYPERLIPIDEMIA ASSOCIATED WITH TYPE 2 DIABETES MELLITUS: ICD-10-CM

## 2020-06-22 DIAGNOSIS — E11.8 CONTROLLED TYPE 2 DIABETES MELLITUS WITH COMPLICATION, WITH LONG-TERM CURRENT USE OF INSULIN: ICD-10-CM

## 2020-06-22 DIAGNOSIS — N18.30 HYPERTENSION ASSOCIATED WITH STAGE 3 CHRONIC KIDNEY DISEASE DUE TO TYPE 2 DIABETES MELLITUS: ICD-10-CM

## 2020-06-22 DIAGNOSIS — M48.062 LUMBAR STENOSIS WITH NEUROGENIC CLAUDICATION: Primary | ICD-10-CM

## 2020-06-22 PROCEDURE — 74018 RADEX ABDOMEN 1 VIEW: CPT | Mod: 26,,, | Performed by: RADIOLOGY

## 2020-06-22 PROCEDURE — 3051F PR MOST RECENT HEMOGLOBIN A1C LEVEL 7.0 - < 8.0%: ICD-10-PCS | Mod: CPTII,S$GLB,, | Performed by: NURSE PRACTITIONER

## 2020-06-22 PROCEDURE — 99215 PR OFFICE/OUTPT VISIT, EST, LEVL V, 40-54 MIN: ICD-10-PCS | Mod: S$GLB,,, | Performed by: NURSE PRACTITIONER

## 2020-06-22 PROCEDURE — 74018 XR ABDOMEN AP 1 VIEW: ICD-10-PCS | Mod: 26,,, | Performed by: RADIOLOGY

## 2020-06-22 PROCEDURE — 3051F HG A1C>EQUAL 7.0%<8.0%: CPT | Mod: CPTII,S$GLB,, | Performed by: NURSE PRACTITIONER

## 2020-06-22 PROCEDURE — 99215 OFFICE O/P EST HI 40 MIN: CPT | Mod: S$GLB,,, | Performed by: NURSE PRACTITIONER

## 2020-06-22 PROCEDURE — 74018 RADEX ABDOMEN 1 VIEW: CPT | Mod: TC,FY,PO

## 2020-06-22 RX ORDER — INSULIN HUMAN 500 [IU]/ML
INJECTION, SOLUTION SUBCUTANEOUS
Qty: 6 SYRINGE | Refills: 11 | Status: SHIPPED | OUTPATIENT
Start: 2020-06-22 | End: 2020-09-22

## 2020-06-22 NOTE — PROGRESS NOTES
Primary Care Provider Appointment    Subjective:      Patient ID: Andra Newell is a 60 y.o. female with 59 y.o. female with history of HTN, HLD, T2DM c/b ESRD s/p living L kidney transplant 1/14/2019, On immunosupressant, DJD, Asthma, hypothyroidism, HPT    Chief Complaint: Follow-up    Pt here for routine follow-up. Pt had a fall 6 days ago, tripped over  and fell on right side. Severe pain to R breast and RUQ, no external injury noted. Pt also started with nausea since event. Does have pain with deep breathing but denies any other symptoms. Pain is starting to improve on chronic pain medication.     BS chart and graph reviewed. Had 5 episodes of hypoglycemia between 12am-3pm. Pt is still taking Humulin PM dose after dinner between 6-7pm. Pt will occasionally check bg right before bed around 10pm, if <70 she will eat a snack. Pt consistently eats dinner at 5-6pm. Sometimes only eats 1/3 of the portion she normally eats if she is not too hungry, thinks this is responsible for hypoglycemia. Pt does eat small slice of cake 2x weekly.     Had recent visit with pain mgmt. Considering cannabis vs pain pump.     Followed by:  Pain Mgmt: Dr. Ruben Nunez. Last seen 05/2020. Next visit 07/20, considering cannabis vs pain pump.  Optometrist: Dr. Arias. Recently had annual exam, records requested.   Endocrinology: Mana Webber NP. Last seen 1/16/20. Change U500 kwikpen 120 breakfast, 20 u dinner  - needs some overnight coverage. U500 acts as both a basal and prandial insulin and has  A 1/2 life similar to NPH. Log in 1 week or sooner for continued hypos  Infectious Disease: Dr. Cammie Kessler. Last seen 5/31/19. Resolution of UTI, diarrhea, and anemia after course of IVIg, withdrawal from letermovir trial, and decrease in immunosuppression.  Neurology: Dr. Ashish Walden. Last seen 7/29/19. F/u post renal transplant for neurogenic claudication secondary to lumbar stenosis. Has a left side achilles tendon  rupture w/o repair. Her gait instability is a combination of both problems. Advised to see back and spine surgery for planning for surgery. Referred to EMG/NCS  Prognostically. f/u with neurology in 3 months.  Transplant: Dr. Maureen Cabral. Last seen 10/28/19. resume CellCept with close monitoring of her white count.  Will start at low dose of 250 mg, 1 cap twice daily. Continue serial monitoring of parvovirus PCR to ensure counts do not worsen.   Urology: Dr. Jacqui Artis. Last seen 4/10/19. Followed for recurrent UTI and urgency. Urethroscopy:  Normal.  Cystoscopy:  Normal bladder mucosa, right dome implanted ureter noted with + efflux. RTC prn   Nephrology: Dr. Heaton: Last visit 05/2019, RTC 01/2020. Will request records.   Psychiatry: Dr. Sheth: Last seen 10/2019. No changes per pt. RTC in 3 mths.   Therapist: Mague Muniz. Sees weekly for CBT.     Past Surgical History:   Procedure Laterality Date    ACHILLES TENDON SURGERY Left May 2015    BACK SURGERY      CHOLECYSTECTOMY      COLONOSCOPY N/A 9/6/2017    Procedure: COLONOSCOPY;  Surgeon: Marisol Bryson MD;  Location: [facility];  Service: Endoscopy;  Laterality: N/A; REPEAT IN 3 YEARS FOR SURVEILLANCE    COLONOSCOPY N/A 5/9/2019    Procedure: COLONOSCOPY;  Surgeon: Fady Ewing MD;  Location: [facility] ([address] [place] [address]);  Service: Endoscopy;  Laterality: N/A;    DIALYSIS FISTULA CREATION  12/2017    ESOPHAGOGASTRODUODENOSCOPY N/A 5/9/2019    Procedure: EGD (ESOPHAGOGASTRODUODENOSCOPY);  Surgeon: Fady Eiwng MD;  Location: [facility] ([address] [place] [address]);  Service: Endoscopy;  Laterality: N/A;    HEMORRHOID SURGERY      INJECTION OF ANESTHETIC AGENT AROUND MEDIAL BRANCH NERVES INNERVATING LUMBAR FACET JOINT Right 9/25/2019    Procedure: Block-nerve-medial branch-lumbar;  Surgeon: Yonny Ferrer MD;  Location: [facility];  Service: Pain Management;  Laterality: Right;  L2, 3, 4,5     INJECTION OF ANESTHETIC AGENT AROUND MEDIAL BRANCH NERVES INNERVATING  LUMBAR FACET JOINT Right 10/16/2019    Procedure: Block-nerve-medial branch-lumbar;  Surgeon: Yonny Ferrer MD;  Location: [facility];  Service: Pain Management;  Laterality: Right;  L2, 3, 4,5     JOINT REPLACEMENT      left    KIDNEY TRANSPLANT N/A 1/14/2019    Procedure: TRANSPLANT, KIDNEY;  Surgeon: Roland Salazar MD;  Location: [facility] OR [facility];  Service: Transplant;  Laterality: N/A;    KNEE SURGERY      RADIOFREQUENCY ABLATION OF LUMBAR MEDIAL BRANCH NERVE AT SINGLE LEVEL Right 10/29/2019    Procedure: Radiofrequency Ablation, Nerve, Spinal, Lumbar, Medial Branch, 1 Level;  Surgeon: Yonny Ferrer MD;  Location: [facility] OR;  Service: Pain Management;  Laterality: Right;  L2, 3, 4, 5  burned at 80 degrees C X 60 seconds each site X 2    SHOULDER ARTHROSCOPY      SHOULDER SURGERY      UPPER GASTROINTESTINAL ENDOSCOPY  ~2013    Dr. Moralez       Past Medical History:   Diagnosis Date    Anemia     Anemia in chronic kidney disease, on chronic dialysis 7/12/2017    Anxiety and depression     Aplastic anemia with parvovirus B19 infection 5/27/2019    Arthritis     Asthma     allergic airway    CKD stage III (moderate) 2/18/2019    Colon polyp     Depression     Diabetes mellitus     Diverticulosis     Eosinophilia     ESRD (end stage renal disease)     stage V, due for living donor 9/2018    ESRD on dialysis     GERD (gastroesophageal reflux disease)     Gout     Gout     Hyperlipidemia     Hypertension     Hypertriglyceridemia without hypercholesterolemia 6/9/2019    Low back pain     Low HDL (under 40) 6/9/2019    MRSA carrier     Neutropenia, unspecified 5/8/2019    Obesity     Renal disorder     Rotator cuff syndrome--left     Thyroid disease     Ulnar neuropathy of right upper extremity     Uncontrolled type 2 diabetes mellitus with hyperglycemia        Social History     Socioeconomic History    Marital status: Significant Other     Spouse name: Any Alvarez    Number of children:  2    Years of education: Not on file    Highest education level: 12th grade   Occupational History    Occupation: retired     Comment:    Social Needs    Financial resource strain: Not hard at all    Food insecurity     Worry: Never true     Inability: Never true    Transportation needs     Medical: No     Non-medical: No   Tobacco Use    Smoking status: Never Smoker    Smokeless tobacco: Never Used   Substance and Sexual Activity    Alcohol use: No    Drug use: No    Sexual activity: Not Currently   Lifestyle    Physical activity     Days per week: 0 days     Minutes per session: 0 min    Stress: Very much   Relationships    Social connections     Talks on phone: Three times a week     Gets together: Once a week     Attends [employer] service: 1 to 4 times per year     Active member of club or organization: No     Attends meetings of clubs or organizations: Never     Relationship status: Living with partner   Other Topics Concern    Not on file   Social History Narrative     female    Disabled since 2015 due to DDD and severe osteoarthritis of knee and hips    Previously worked as  at [employer]    Lives in residential 1 story home with partner, Any.     Has 2 boys, 1 lives in [place], 1 in [place].         Hoahaoism: Temple    Hobbies: painting and crafts.         Review of Systems   Constitutional: Positive for fatigue. Negative for activity change, appetite change, fever and unexpected weight change.   HENT: Negative for congestion, hearing loss, sore throat, trouble swallowing and voice change.    Eyes: Negative for visual disturbance.   Respiratory: Negative for apnea, cough, chest tightness, shortness of breath and wheezing.    Cardiovascular: Negative for chest pain, palpitations and leg swelling.   Gastrointestinal: Negative for abdominal pain, blood in stool, constipation, diarrhea, nausea and vomiting.   Genitourinary: Negative for  "difficulty urinating, dysuria, enuresis, flank pain, frequency, hematuria, pelvic pain and urgency.   Musculoskeletal: Positive for back pain. Negative for arthralgias, gait problem, joint swelling, myalgias, neck pain and neck stiffness.   Skin: Negative for rash and wound.   Neurological: Negative for dizziness, tremors, syncope, weakness, light-headedness, numbness and headaches.   Hematological: Does not bruise/bleed easily.   Psychiatric/Behavioral: Negative for dysphoric mood and sleep disturbance. The patient is not nervous/anxious.        Objective:   BP (!) 148/72 (BP Location: Right arm, Patient Position: Sitting, BP Method: Large (Manual))   Pulse 100   Temp 99.7 °F (37.6 °C) (Oral)   Ht 5' 7" (1.702 m)   Wt 100.5 kg (221 lb 9 oz)   LMP 02/28/2012   SpO2 96%   BMI 34.70 kg/m²     Physical Exam  Vitals signs and nursing note reviewed.   Constitutional:       General: She is not in acute distress.     Appearance: She is well-developed. She is not diaphoretic.   HENT:      Head: Normocephalic and atraumatic.   Cardiovascular:      Rate and Rhythm: Normal rate and regular rhythm.      Heart sounds: Murmur present. Systolic murmur present with a grade of 1/6. No friction rub. No gallop.    Pulmonary:      Effort: Pulmonary effort is normal. No respiratory distress.      Breath sounds: Normal breath sounds. No wheezing.   Chest:      Chest wall: No tenderness.   Abdominal:      General: Bowel sounds are normal. There is no distension.      Palpations: Abdomen is soft. There is no mass.      Tenderness: There is abdominal tenderness. There is no guarding or rebound.      Comments: Tenderness with palpation to RUQ   Musculoskeletal: Normal range of motion.         General: No tenderness or deformity.   Skin:     General: Skin is warm and dry.   Neurological:      Mental Status: She is alert and oriented to person, place, and time.   Psychiatric:         Behavior: Behavior normal.         Thought Content: " Thought content normal.         Judgment: Judgment normal.         Lab Results   Component Value Date    WBC 5.74 05/12/2020    HGB 11.8 (L) 05/12/2020    HCT 38.9 05/12/2020     05/12/2020    CHOL 138 11/21/2019    TRIG 148 11/21/2019    HDL 36 (L) 11/21/2019    ALT 27 10/14/2019    AST 24 10/14/2019     05/12/2020    K 4.8 05/12/2020     05/12/2020    CREATININE 1.0 05/12/2020    BUN 13 05/12/2020    CO2 27 05/12/2020    TSH 0.777 05/12/2020    INR 1.1 05/07/2019    HGBA1C 7.0 (H) 05/12/2020    HGBA1C 7.0 (H) 05/12/2020    HGBA1C 7.0 (H) 05/12/2020       Medication List with Changes/Refills   Current Medications    BLOOD SUGAR DIAGNOSTIC (BLOOD GLUCOSE TEST) STRP    Checks her bg 4 times a day   e11.9    FLASH GLUCOSE SENSOR ([employer] DAKOTAH 14 DAY SENSOR) KIT    Uses 2 kit monthly    HYDROCODONE-ACETAMINOPHEN (NORCO) 7.5-325 MG PER TABLET    Take 1 tablet by mouth every 6 (six) hours as needed for Pain.    LEVOTHYROXINE (SYNTHROID) 75 MCG TABLET    Take 1 tablet (75 mcg total) by mouth once daily.    LORAZEPAM (ATIVAN) 1 MG TABLET    Take 1 mg by mouth every evening.    MAGNESIUM OXIDE 500 MG TAB    Take 500 mg by mouth once daily.    METFORMIN (GLUCOPHAGE-XR) 500 MG 24 HR TABLET    Take 1 tablet (500 mg total) by mouth 2 (two) times daily with meals.    MULTIVITAMIN (THERAGRAN) TABLET    Take 1 tablet by mouth once daily.    MYCOPHENOLATE (MYFORTIC) 180 MG TBEC    Take 2 tablets (360 mg total) by mouth 2 (two) times daily.    PANTOPRAZOLE (PROTONIX) 20 MG TABLET    Take 1 tablet (20 mg total) by mouth once daily.    ROSUVASTATIN (CRESTOR) 10 MG TABLET    Take 1 tablet (10 mg total) by mouth every evening.    TACROLIMUS (PROGRAF) 0.5 MG CAP    Take 1 capsule (0.5 mg total) by mouth every 12 (twelve) hours. To take along with 1mg capsules for total dose of 1.5mg every 12 hours.    TACROLIMUS (PROGRAF) 1 MG CAP    Take 1 capsule (1 mg total) by mouth every 12 (twelve) hours. To take along with  0.5mg capsules for total dose of 1.5mg every 12 hours.    VILAZODONE (VIIBRYD) 40 MG TAB TABLET    Take 1 tablet (40 mg total) by mouth once daily.   Changed and/or Refilled Medications    Modified Medication Previous Medication    INSULIN REGULAR HUM U-500 CONC (HUMULIN R U-500, CONC, KWIKPEN) 500 UNIT/ML (3 ML) INPN HUMULIN R U-500, CONC, KWIKPEN 500 unit/mL (3 mL) InPn       INJECT 120 UNITS UNDER WITH BREAKFAST AND 40 UNITS WITH DINNER.    INJECT 100 UNITS UNDER THE SKIN TWICE DAILY WITH BREAKFAST AND DINNER.   Discontinued Medications    CIPROFLOXACIN HCL (CIPRO) 500 MG TABLET    Take 1 tablet (500 mg total) by mouth every 12 (twelve) hours.         RESULTS: Reviewed labs and images today    Assessment:   60 y.o. female with multiple co-morbid illnesses here to chronic care management.     Plan:     Problem List Items Addressed This Visit        Neuro    Lumbar stenosis with neurogenic claudication - Primary     Followed by neuro and pain mgmt  - Recommend conservative management by neurosurgery given no neurologic deficit and surgical intervention may not help her axial back pain.   - failed epidural steroid injection   - considering between cannabis and pain pump   - pain goal 3/10, today 5/10.   - encourage to start aqua therapy              Pulmonary    Mild intermittent asthma without complication     Well controlled, denies recent exacerbation   Lungs ctab  Will cont to monitor             Cardiac/Vascular    Mixed diabetic hyperlipidemia associated with type 2 diabetes mellitus    Relevant Medications    insulin regular hum U-500 conc (HUMULIN R U-500, CONC, KWIKPEN) 500 unit/mL (3 mL) InPn    Hypertension associated with stage 3 chronic kidney disease due to type 2 diabetes mellitus    Relevant Medications    insulin regular hum U-500 conc (HUMULIN R U-500, CONC, KWIKPEN) 500 unit/mL (3 mL) InPn       Endocrine    Controlled type 2 diabetes mellitus with complication, with long-term current use of  insulin     Followed by endocrinology  - A1c 7.0 (05/2020)  - hypoglycemic episodes greatly reduced,  - On Humulin R U-500 120 units with breakfast and 40 units after dinner.   - Of note, 5 hypoglycemic episodes between 12am-3am.   - if blood sugar 150 and below in the evening, take 35 units, 40 units for blood sugar above 150.              Relevant Medications    insulin regular hum U-500 conc (HUMULIN R U-500, CONC, KWIKPEN) 500 unit/mL (3 mL) [facility]    Obesity (BMI 30-39.9)     BMI 34, weight stable  Mediterranean diet encouraged  Encourage to start aqua therapy 2x weekly             GI    Abdominal pain due to injury     Pt had a fall 6 days ago, tripped over  and fell on right side.   Severe pain to R breast and RUQ, no external injury noted.   Pt also started with nausea since event. No issues eating.   Does have pain with deep breathing but denies any other symptoms.   Pain is starting to improve on chronic pain medication.   KUB unremarkable.   Monitor, rtc if symptoms does not improve in 1 week          Relevant Orders    X-Ray Abdomen AP 1 View (Completed)      Other Visit Diagnoses     Uncontrolled type 2 diabetes mellitus with diabetic nephropathy, with long-term current use of insulin        Relevant Medications    insulin regular hum U-500 conc (HUMULIN R U-500, CONC, KWIKPEN) 500 unit/mL (3 mL) [facility]          Health Maintenance       Date Due Completion Date    Aspirin/Antiplatelet Therapy 02/15/1976 ---    Shingles Vaccine (1 of 2) 02/15/2008 ---    Hemoglobin A1c 08/12/2020 5/12/2020    Eye Exam 09/17/2020 9/17/2019    Lipid Panel 11/21/2020 11/21/2019    Foot Exam 12/11/2020 12/11/2019    Override on 10/2/2019: Done    Mammogram 12/03/2021 12/3/2019    Pap Smear with HPV Cotest 09/04/2023 9/4/2018    Pneumococcal Vaccine (Highest Risk) (3 of 3 - PPSV23) 04/30/2024 4/30/2019    Colorectal Cancer Screening 05/09/2024 5/9/2019    TETANUS VACCINE 07/12/2027 7/12/2017          F/u in 3 months.  Total face-to-face time was 40 min, 50% of this was spent on counseling and coordination of care.       GHULAM Aceves-C  Family Medicine  [facility] [place]

## 2020-06-22 NOTE — PATIENT INSTRUCTIONS
- Start Vitamin D3 supplement 1000 IU daily   - Continue Humulin 120 units with breakfast, if blood sugar 150 and below in the evening, take 35 units, 40 units for blood sugar above 150.   - Will get ultrasound today   - Labs next office visit

## 2020-06-22 NOTE — ASSESSMENT & PLAN NOTE
Followed by neuro and pain mgmt  - Recommend conservative management by neurosurgery given no neurologic deficit and surgical intervention may not help her axial back pain.   - failed epidural steroid injection   - considering between cannabis and pain pump   - pain goal 3/10, today 5/10.   - encourage to start aqua therapy

## 2020-06-23 PROBLEM — R10.9 ABDOMINAL PAIN DUE TO INJURY: Status: ACTIVE | Noted: 2020-06-23

## 2020-06-23 NOTE — ASSESSMENT & PLAN NOTE
Followed by endocrinology  - A1c 7.0 (05/2020)  - hypoglycemic episodes greatly reduced,  - On Humulin R U-500 120 units with breakfast and 40 units after dinner.   - Of note, 5 hypoglycemic episodes between 12am-3am.   - if blood sugar 150 and below in the evening, take 35 units, 40 units for blood sugar above 150.

## 2020-06-23 NOTE — ASSESSMENT & PLAN NOTE
Pt had a fall 6 days ago, tripped over  and fell on right side.   Severe pain to R breast and RUQ, no external injury noted.   Pt also started with nausea since event. No issues eating.   Does have pain with deep breathing but denies any other symptoms.   Pain is starting to improve on chronic pain medication.   KUB unremarkable.   Monitor, rtc if symptoms does not improve in 1 week

## 2020-06-23 NOTE — ASSESSMENT & PLAN NOTE
Mostly related to declining health and chronic pain  Currently on viibryd daily, mood stable per pt  Working on treatment plan for chronic pain  Encourage aqua therapy 2x weekly

## 2020-07-10 LAB — O+P STL MICRO: NORMAL

## 2020-07-11 ENCOUNTER — NURSE TRIAGE (OUTPATIENT)
Dept: ADMINISTRATIVE | Facility: CLINIC | Age: 60
End: 2020-07-11

## 2020-07-11 NOTE — TELEPHONE ENCOUNTER
Speaking to Any (partner) on behalf of pt    Exposed to son x 12 days ago that tested positive for COVID today. Pt is not having any symptoms. Pt is kidney tx pt. Current temp is 99.1 oral, denies any symptoms.      Request that I reach out to Oracio Hendricks (tx coordinator) to let him know about COVID exposure.     Pt will try to go to local  that has rapid testing available tomorrow morning.     Reason for Disposition   [1] COVID-19 EXPOSURE (Close Contact) AND [2] within last 14 days BUT [3] NO symptoms    Protocols used: CORONAVIRUS (COVID-19) EXPOSURE-A-AH

## 2020-07-13 ENCOUNTER — LAB VISIT (OUTPATIENT)
Dept: LAB | Facility: HOSPITAL | Age: 60
End: 2020-07-13
Attending: INTERNAL MEDICINE
Payer: MEDICARE

## 2020-07-13 DIAGNOSIS — Z94.0 KIDNEY REPLACED BY TRANSPLANT: ICD-10-CM

## 2020-07-13 LAB
BASOPHILS # BLD AUTO: 0.04 K/UL (ref 0–0.2)
BASOPHILS NFR BLD: 0.8 % (ref 0–1.9)
DIFFERENTIAL METHOD: ABNORMAL
EOSINOPHIL # BLD AUTO: 0.4 K/UL (ref 0–0.5)
EOSINOPHIL NFR BLD: 8.9 % (ref 0–8)
ERYTHROCYTE [DISTWIDTH] IN BLOOD BY AUTOMATED COUNT: 16 % (ref 11.5–14.5)
HCT VFR BLD AUTO: 33.6 % (ref 37–48.5)
HGB BLD-MCNC: 10.1 G/DL (ref 12–16)
IMM GRANULOCYTES # BLD AUTO: 0.02 K/UL (ref 0–0.04)
IMM GRANULOCYTES NFR BLD AUTO: 0.4 % (ref 0–0.5)
LYMPHOCYTES # BLD AUTO: 0.9 K/UL (ref 1–4.8)
LYMPHOCYTES NFR BLD: 18.7 % (ref 18–48)
MCH RBC QN AUTO: 29.3 PG (ref 27–31)
MCHC RBC AUTO-ENTMCNC: 30.1 G/DL (ref 32–36)
MCV RBC AUTO: 97 FL (ref 82–98)
MONOCYTES # BLD AUTO: 0.7 K/UL (ref 0.3–1)
MONOCYTES NFR BLD: 14.6 % (ref 4–15)
NEUTROPHILS # BLD AUTO: 2.7 K/UL (ref 1.8–7.7)
NEUTROPHILS NFR BLD: 56.6 % (ref 38–73)
NRBC BLD-RTO: 0 /100 WBC
PLATELET # BLD AUTO: 239 K/UL (ref 150–350)
PMV BLD AUTO: 9.8 FL (ref 9.2–12.9)
RBC # BLD AUTO: 3.45 M/UL (ref 4–5.4)
WBC # BLD AUTO: 4.71 K/UL (ref 3.9–12.7)

## 2020-07-13 PROCEDURE — 83735 ASSAY OF MAGNESIUM: CPT

## 2020-07-13 PROCEDURE — 84075 ASSAY ALKALINE PHOSPHATASE: CPT

## 2020-07-13 PROCEDURE — 80197 ASSAY OF TACROLIMUS: CPT

## 2020-07-13 PROCEDURE — 86352 CELL FUNCTION ASSAY W/STIM: CPT

## 2020-07-13 PROCEDURE — 85025 COMPLETE CBC W/AUTO DIFF WBC: CPT

## 2020-07-13 PROCEDURE — 36415 COLL VENOUS BLD VENIPUNCTURE: CPT | Mod: PO

## 2020-07-13 PROCEDURE — 87799 DETECT AGENT NOS DNA QUANT: CPT

## 2020-07-13 PROCEDURE — 80069 RENAL FUNCTION PANEL: CPT

## 2020-07-13 NOTE — TELEPHONE ENCOUNTER
Patient denies S/S of COVID-19. She states her son was diagnose COVID positive  On Friday 7/10/2020 and she around her son x 12 days.  Patient to keep coordinator posted.

## 2020-07-14 ENCOUNTER — PATIENT MESSAGE (OUTPATIENT)
Dept: PRIMARY CARE CLINIC | Facility: CLINIC | Age: 60
End: 2020-07-14

## 2020-07-14 LAB
ALBUMIN SERPL BCP-MCNC: 4.2 G/DL (ref 3.5–5.2)
ALBUMIN SERPL BCP-MCNC: 4.2 G/DL (ref 3.5–5.2)
ALP SERPL-CCNC: 149 U/L (ref 55–135)
ALT SERPL W/O P-5'-P-CCNC: 34 U/L (ref 10–44)
ANION GAP SERPL CALC-SCNC: 13 MMOL/L (ref 8–16)
AST SERPL-CCNC: 33 U/L (ref 10–40)
BILIRUB DIRECT SERPL-MCNC: 0.2 MG/DL (ref 0.1–0.3)
BILIRUB SERPL-MCNC: 0.5 MG/DL (ref 0.1–1)
BUN SERPL-MCNC: 17 MG/DL (ref 8–23)
CALCIUM SERPL-MCNC: 10.6 MG/DL (ref 8.7–10.5)
CHLORIDE SERPL-SCNC: 107 MMOL/L (ref 95–110)
CO2 SERPL-SCNC: 23 MMOL/L (ref 23–29)
CREAT SERPL-MCNC: 1.3 MG/DL (ref 0.5–1.4)
EST. GFR  (AFRICAN AMERICAN): 50.8 ML/MIN/1.73 M^2
EST. GFR  (NON AFRICAN AMERICAN): 44.1 ML/MIN/1.73 M^2
GLUCOSE SERPL-MCNC: 195 MG/DL (ref 70–110)
MAGNESIUM SERPL-MCNC: 1.6 MG/DL (ref 1.6–2.6)
PHOSPHATE SERPL-MCNC: 4.4 MG/DL (ref 2.7–4.5)
POTASSIUM SERPL-SCNC: 5.4 MMOL/L (ref 3.5–5.1)
PROT SERPL-MCNC: 7.4 G/DL (ref 6–8.4)
SODIUM SERPL-SCNC: 143 MMOL/L (ref 136–145)
TACROLIMUS BLD-MCNC: 7.4 NG/ML (ref 5–15)

## 2020-07-15 ENCOUNTER — LAB VISIT (OUTPATIENT)
Dept: LAB | Facility: HOSPITAL | Age: 60
End: 2020-07-15
Attending: INTERNAL MEDICINE
Payer: MEDICARE

## 2020-07-15 DIAGNOSIS — N30.01 ACUTE CYSTITIS WITH HEMATURIA: ICD-10-CM

## 2020-07-15 PROCEDURE — 87186 SC STD MICRODIL/AGAR DIL: CPT

## 2020-07-15 PROCEDURE — 81001 URINALYSIS AUTO W/SCOPE: CPT

## 2020-07-15 PROCEDURE — 87086 URINE CULTURE/COLONY COUNT: CPT

## 2020-07-15 PROCEDURE — 87077 CULTURE AEROBIC IDENTIFY: CPT

## 2020-07-15 PROCEDURE — 87088 URINE BACTERIA CULTURE: CPT

## 2020-07-15 RX ORDER — TACROLIMUS 0.5 MG/1
0.5 CAPSULE ORAL EVERY MORNING
Qty: 30 CAPSULE | Refills: 11 | Status: SHIPPED | OUTPATIENT
Start: 2020-07-15 | End: 2020-09-08 | Stop reason: SDUPTHER

## 2020-07-15 RX ORDER — TACROLIMUS 1 MG/1
1 CAPSULE ORAL EVERY 12 HOURS
Qty: 60 CAPSULE | Refills: 11 | Status: SHIPPED | OUTPATIENT
Start: 2020-07-15 | End: 2020-09-08 | Stop reason: SDUPTHER

## 2020-07-15 NOTE — TELEPHONE ENCOUNTER
Called pt to discuss lab results. Pt states transplant team did reach out to her to review labs, she just wanted pcp to be aware. Labs noted. Pt to return with another urine sample for possible uti. Otherwise doing well, no acute complaints.

## 2020-07-15 NOTE — TELEPHONE ENCOUNTER
Results reviewed with patient. Pt got Dr. Mercado's note and adjusted prograf last night to 1.5/1.0. repeat labs 7/24 before clinic appointment.     ----- Message from Maureen Cabral MD sent at 7/14/2020  5:40 PM CDT -----  The following message sent to patient via [facility]: Your kidney function looks stable and excellent! It is also time to lower your dose from 1.5/1.5 to 1.5 mg in the morning and 1.0 mg nightly. The calcium is a bit high. Please review with your nephrologist how to get that down; be sure you are not taking any calcium containing supplements.

## 2020-07-15 NOTE — TELEPHONE ENCOUNTER
Pt is having some burning with morning urinations but does not consistently have throughout the day. Pt will submit urine sample this afternoon.     ----- Message from Maureen Cabral MD sent at 7/14/2020  5:36 PM CDT -----  The following message sent to patient via [facility]: UA shows a lot of white cells. If you have having symptoms of urine infection, please submit another urine for ua reflex to culture. And don't forget to do the clean catch mid stream collection method.

## 2020-07-16 LAB
BILIRUB UR QL STRIP: NEGATIVE
BKV DNA SERPL NAA+PROBE-ACNC: <125 COPIES/ML
BKV DNA SERPL NAA+PROBE-LOG#: <2.1 LOG (10) COPIES/ML
BKV DNA SERPL QL NAA+PROBE: NOT DETECTED
CLARITY UR REFRACT.AUTO: ABNORMAL
COLOR UR AUTO: YELLOW
GLUCOSE UR QL STRIP: NEGATIVE
HGB UR QL STRIP: NEGATIVE
IMMUNKNOW (STIMULATED): 247 NG/ML (ref 226–524)
KETONES UR QL STRIP: NEGATIVE
LEUKOCYTE ESTERASE UR QL STRIP: ABNORMAL
MICROSCOPIC COMMENT: NORMAL
NITRITE UR QL STRIP: NEGATIVE
PH UR STRIP: 5 [PH] (ref 5–8)
PROT UR QL STRIP: NEGATIVE
SP GR UR STRIP: 1.01 (ref 1–1.03)
SQUAMOUS #/AREA URNS AUTO: 2 /HPF
URN SPEC COLLECT METH UR: ABNORMAL
WBC #/AREA URNS AUTO: 3 /HPF (ref 0–5)

## 2020-07-16 NOTE — TELEPHONE ENCOUNTER
Nelson DANIELS Spoke with Andra. She states that she is doing well, just waiting for her transplant. Denies any increasing depression or feelings of self harm.     She states that her current weight is 227-230 lbs. Encouraged compliance with her diet due to her BMI being very close to 40. She expressed understanding. She will attend her lab appt on 1/2/19 as ordered. Explained to her that her kidney transplant preop appts cannot be scheduled until she has spoken to the transplant  as requested by the transplant physician. Andra stated that she will contact the  to update her status as requested.

## 2020-07-17 LAB — BACTERIA UR CULT: ABNORMAL

## 2020-07-20 ENCOUNTER — TELEPHONE (OUTPATIENT)
Dept: TRANSPLANT | Facility: CLINIC | Age: 60
End: 2020-07-20

## 2020-07-20 NOTE — TELEPHONE ENCOUNTER
Patient repeated back and voice a understanding of orders.  Rx phone in to [facility] since [facility] and [facility] pharmacy in [place] did not have med in stock.    ----- Message from Maureen Cabral MD sent at 7/19/2020 10:31 PM CDT -----  Results reviewed. cefpodoxime 200 mg BID x 7 days. She should consult with uroGYN.

## 2020-07-24 ENCOUNTER — OFFICE VISIT (OUTPATIENT)
Dept: TRANSPLANT | Facility: CLINIC | Age: 60
End: 2020-07-24
Payer: MEDICARE

## 2020-07-24 ENCOUNTER — LAB VISIT (OUTPATIENT)
Dept: LAB | Facility: HOSPITAL | Age: 60
End: 2020-07-24
Attending: INTERNAL MEDICINE
Payer: MEDICARE

## 2020-07-24 VITALS
TEMPERATURE: 98 F | HEART RATE: 73 BPM | HEIGHT: 67 IN | BODY MASS INDEX: 34.67 KG/M2 | WEIGHT: 220.88 LBS | OXYGEN SATURATION: 98 % | DIASTOLIC BLOOD PRESSURE: 65 MMHG | RESPIRATION RATE: 18 BRPM | SYSTOLIC BLOOD PRESSURE: 133 MMHG

## 2020-07-24 DIAGNOSIS — Z94.0 IMMUNOSUPPRESSIVE MANAGEMENT ENCOUNTER FOLLOWING KIDNEY TRANSPLANT: ICD-10-CM

## 2020-07-24 DIAGNOSIS — N18.30 CHRONIC KIDNEY DISEASE (CKD), STAGE III (MODERATE): ICD-10-CM

## 2020-07-24 DIAGNOSIS — Z20.828 PARVOVIRUS EXPOSURE: ICD-10-CM

## 2020-07-24 DIAGNOSIS — Z79.899 IMMUNOSUPPRESSIVE MANAGEMENT ENCOUNTER FOLLOWING KIDNEY TRANSPLANT: ICD-10-CM

## 2020-07-24 DIAGNOSIS — Z94.0 STATUS POST LIVING-DONOR KIDNEY TRANSPLANTATION: Primary | ICD-10-CM

## 2020-07-24 DIAGNOSIS — Z94.0 KIDNEY REPLACED BY TRANSPLANT: ICD-10-CM

## 2020-07-24 DIAGNOSIS — Z91.89 AT RISK FOR OPPORTUNISTIC INFECTIONS: ICD-10-CM

## 2020-07-24 LAB
ALBUMIN SERPL BCP-MCNC: 4.2 G/DL (ref 3.5–5.2)
ANION GAP SERPL CALC-SCNC: 10 MMOL/L (ref 8–16)
BASOPHILS # BLD AUTO: 0.05 K/UL (ref 0–0.2)
BASOPHILS NFR BLD: 0.7 % (ref 0–1.9)
BUN SERPL-MCNC: 18 MG/DL (ref 8–23)
CALCIUM SERPL-MCNC: 10.2 MG/DL (ref 8.7–10.5)
CHLORIDE SERPL-SCNC: 107 MMOL/L (ref 95–110)
CO2 SERPL-SCNC: 27 MMOL/L (ref 23–29)
CREAT SERPL-MCNC: 1 MG/DL (ref 0.5–1.4)
DIFFERENTIAL METHOD: ABNORMAL
EOSINOPHIL # BLD AUTO: 0.7 K/UL (ref 0–0.5)
EOSINOPHIL NFR BLD: 9.8 % (ref 0–8)
ERYTHROCYTE [DISTWIDTH] IN BLOOD BY AUTOMATED COUNT: 17.3 % (ref 11.5–14.5)
EST. GFR  (AFRICAN AMERICAN): >60 ML/MIN/1.73 M^2
EST. GFR  (NON AFRICAN AMERICAN): >60 ML/MIN/1.73 M^2
GLUCOSE SERPL-MCNC: 139 MG/DL (ref 70–110)
HCT VFR BLD AUTO: 35.5 % (ref 37–48.5)
HGB BLD-MCNC: 10.8 G/DL (ref 12–16)
IMM GRANULOCYTES # BLD AUTO: 0.03 K/UL (ref 0–0.04)
IMM GRANULOCYTES NFR BLD AUTO: 0.4 % (ref 0–0.5)
LYMPHOCYTES # BLD AUTO: 1.1 K/UL (ref 1–4.8)
LYMPHOCYTES NFR BLD: 15.9 % (ref 18–48)
MAGNESIUM SERPL-MCNC: 1.6 MG/DL (ref 1.6–2.6)
MCH RBC QN AUTO: 29.3 PG (ref 27–31)
MCHC RBC AUTO-ENTMCNC: 30.4 G/DL (ref 32–36)
MCV RBC AUTO: 97 FL (ref 82–98)
MONOCYTES # BLD AUTO: 0.6 K/UL (ref 0.3–1)
MONOCYTES NFR BLD: 8.9 % (ref 4–15)
NEUTROPHILS # BLD AUTO: 4.4 K/UL (ref 1.8–7.7)
NEUTROPHILS NFR BLD: 64.3 % (ref 38–73)
NRBC BLD-RTO: 0 /100 WBC
PHOSPHATE SERPL-MCNC: 4.3 MG/DL (ref 2.7–4.5)
PLATELET # BLD AUTO: 222 K/UL (ref 150–350)
PMV BLD AUTO: 9.6 FL (ref 9.2–12.9)
POTASSIUM SERPL-SCNC: 5.3 MMOL/L (ref 3.5–5.1)
RBC # BLD AUTO: 3.68 M/UL (ref 4–5.4)
SODIUM SERPL-SCNC: 144 MMOL/L (ref 136–145)
TACROLIMUS BLD-MCNC: 5.6 NG/ML (ref 5–15)
WBC # BLD AUTO: 6.85 K/UL (ref 3.9–12.7)

## 2020-07-24 PROCEDURE — 3008F BODY MASS INDEX DOCD: CPT | Mod: CPTII,S$GLB,, | Performed by: INTERNAL MEDICINE

## 2020-07-24 PROCEDURE — 3078F PR MOST RECENT DIASTOLIC BLOOD PRESSURE < 80 MM HG: ICD-10-PCS | Mod: CPTII,S$GLB,, | Performed by: INTERNAL MEDICINE

## 2020-07-24 PROCEDURE — 80069 RENAL FUNCTION PANEL: CPT

## 2020-07-24 PROCEDURE — 85025 COMPLETE CBC W/AUTO DIFF WBC: CPT

## 2020-07-24 PROCEDURE — 3075F PR MOST RECENT SYSTOLIC BLOOD PRESS GE 130-139MM HG: ICD-10-PCS | Mod: CPTII,S$GLB,, | Performed by: INTERNAL MEDICINE

## 2020-07-24 PROCEDURE — 99215 OFFICE O/P EST HI 40 MIN: CPT | Mod: S$GLB,,, | Performed by: INTERNAL MEDICINE

## 2020-07-24 PROCEDURE — 3075F SYST BP GE 130 - 139MM HG: CPT | Mod: CPTII,S$GLB,, | Performed by: INTERNAL MEDICINE

## 2020-07-24 PROCEDURE — 36415 COLL VENOUS BLD VENIPUNCTURE: CPT

## 2020-07-24 PROCEDURE — 3078F DIAST BP <80 MM HG: CPT | Mod: CPTII,S$GLB,, | Performed by: INTERNAL MEDICINE

## 2020-07-24 PROCEDURE — 87799 DETECT AGENT NOS DNA QUANT: CPT

## 2020-07-24 PROCEDURE — 80197 ASSAY OF TACROLIMUS: CPT

## 2020-07-24 PROCEDURE — 99999 PR PBB SHADOW E&M-EST. PATIENT-LVL V: ICD-10-PCS | Mod: PBBFAC,,, | Performed by: INTERNAL MEDICINE

## 2020-07-24 PROCEDURE — 99999 PR PBB SHADOW E&M-EST. PATIENT-LVL V: CPT | Mod: PBBFAC,,, | Performed by: INTERNAL MEDICINE

## 2020-07-24 PROCEDURE — 83735 ASSAY OF MAGNESIUM: CPT

## 2020-07-24 PROCEDURE — 99215 PR OFFICE/OUTPT VISIT, EST, LEVL V, 40-54 MIN: ICD-10-PCS | Mod: S$GLB,,, | Performed by: INTERNAL MEDICINE

## 2020-07-24 PROCEDURE — 3008F PR BODY MASS INDEX (BMI) DOCUMENTED: ICD-10-PCS | Mod: CPTII,S$GLB,, | Performed by: INTERNAL MEDICINE

## 2020-07-24 RX ORDER — MYCOPHENOLIC ACID 180 MG/1
360 TABLET, DELAYED RELEASE ORAL 2 TIMES DAILY
Qty: 120 TABLET | Refills: 11 | Status: SHIPPED | OUTPATIENT
Start: 2020-07-24 | End: 2020-07-30 | Stop reason: SINTOL

## 2020-07-24 RX ORDER — FAMOTIDINE 20 MG/1
20 TABLET, FILM COATED ORAL 2 TIMES DAILY
Qty: 60 TABLET | Refills: 11 | Status: SHIPPED | OUTPATIENT
Start: 2020-07-24 | End: 2021-02-19 | Stop reason: ALTCHOICE

## 2020-07-24 NOTE — PROGRESS NOTES
Kidney Post-Transplant Assessment    Referring Physician: Dalton Heaton  Current Nephrologist: Dalton Heaton    ORGAN: LEFT KIDNEY  Donor Type: living  PHS Increased Risk: no  Cold Ischemia: 37 mins  Induction Medications: campath - alemtuzumab (anti-cd52)    Subjective:     CC:  Reassessment of renal allograft function and management of chronic immunosuppression.    HPI:  Ms. Newell is a 60 y.o. year old White female who received a living kidney transplant on 1/14/19. Her most recent creatinine is 1.1. She takes mycophenolate mofetil (held d/t recurrent ESBL UTIs Jan-Feb 2019) and tacrolimus for maintenance immunosuppression. Her post transplant course has been uncomplicated to date.    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 She briefly required HD 4/13/18 until ~Sept 2018.  she was predialysis at the time of transplant.   -LURD KT (friend) 01/14/2019,  Induced with Campath.     -Recurrent K pneumoniae ESBL 2019 UTI 1/30, 2/11, and  2/25  -Admit for unsteadiness and dizziness with work up WNL, MRI pending (r/s d/t hosp admit)  -admitted 05/07/2019 with fever infectious workup negative for bacteria.  Refractory anemia noted, and it was found a parvovirus B19 from 4/15/19 causing aplastic anemia was positive  -colonoscopy May 2019 for diarrhea unrevealing.    PCP PROPHYLAXIS: Until 1/14/19; Atovaquone; Bactrim stopped 01/24/2019 D/T hiK  CMV PROPHYLAXIS: CMV  Mismatch -study participant--study participation terminated May 2019 when pancytopenia noted, and it became clear she would require longer-term withholding of antiviral medication than was allowed in the research study. Rx until 7/14/19  FUNGAL PROPHYLAXIS: None    POST TRANSPLANT UPDATE 07/24/2020   Andra present for reassessment. The following updates were ascertained:  She feels well overall. Her recent urine culture showed evidence of bacteria, and antibiotics were  "prescribed.  He denies any recent fevers.  Breathing problems? None, no CP  Pain over allograft? No but + terminal dysuria  HTN: 130/70s at home, not dizzy   DM: "good" overall- 170s in days, dips at night into 50s  Edema: no  +Chronic back pain- still on pain meds. She continues to have back pain, and may need a implantable pain pump.  She was advised to try CBD oil, but has been reluctant to do so.  She reports having had a fall 6 weeks ago, landed on her chest, and still having some soreness in the breast area.  She has had this checked out and was told there was nothing serious.    Current Outpatient Medications   Medication Sig    blood sugar diagnostic (BLOOD GLUCOSE TEST) Strp Checks her bg 4 times a day   e11.9    cefpodoxime (VANTIN) 200 MG tablet Take 1 tablet (200 mg total) by mouth 2 (two) times a day. FOR 7 DAYS    flash glucose sensor (FREESTYLE DAKOTAH 14 DAY SENSOR) Kit Uses 2 kit monthly    HYDROcodone-acetaminophen (NORCO) 7.5-325 mg per tablet Take 1 tablet by mouth every 6 (six) hours as needed for Pain.    insulin regular hum U-500 conc (HUMULIN R U-500, CONC, KWIKPEN) 500 unit/mL (3 mL) InPn INJECT 120 UNITS UNDER WITH BREAKFAST AND 40 UNITS WITH DINNER.    levothyroxine (SYNTHROID) 75 MCG tablet Take 1 tablet (75 mcg total) by mouth once daily.    LORazepam (ATIVAN) 1 MG tablet Take 1 mg by mouth every evening.    magnesium oxide 500 mg Tab Take 500 mg by mouth once daily.    metFORMIN (GLUCOPHAGE-XR) 500 MG 24 hr tablet Take 1 tablet (500 mg total) by mouth 2 (two) times daily with meals.    multivitamin (THERAGRAN) tablet Take 1 tablet by mouth once daily.    mycophenolate (MYFORTIC) 180 MG TbEC Take 2 tablets (360 mg total) by mouth 2 (two) times daily.    pantoprazole (PROTONIX) 20 MG tablet Take 1 tablet (20 mg total) by mouth once daily.    rosuvastatin (CRESTOR) 10 MG tablet Take 1 tablet (10 mg total) by mouth every evening.    tacrolimus (PROGRAF) 0.5 MG Cap Take 1 capsule " "(0.5 mg total) by mouth every morning. Total of 1.5mg in am and 1 mg in pm z94.0    tacrolimus (PROGRAF) 1 MG Cap Take 1 capsule (1 mg total) by mouth every 12 (twelve) hours. Total of 1.5mg in am and 1 mg in pm z94.0    vilazodone (VIIBRYD) 40 mg Tab tablet Take 1 tablet (40 mg total) by mouth once daily.     Current Facility-Administered Medications   Medication    sodium chloride 0.9% bolus 2,000 mL       Review of Systems   Constitutional: Negative for fever.   Eyes: Negative for visual disturbance.   Respiratory: Negative for shortness of breath.    Cardiovascular: Negative for chest pain and leg swelling.   Gastrointestinal: Negative.    Genitourinary: Negative for difficulty urinating.   Musculoskeletal: Positive for back pain.   Skin: Negative for rash.   Allergic/Immunologic: Positive for immunocompromised state.   Neurological: Negative for tremors.     Objective:     /65 (BP Location: Right arm, Patient Position: Sitting, BP Method: Medium (Automatic))   Pulse 73   Temp 98.3 °F (36.8 °C) (Oral)   Resp 18   Ht 5' 7" (1.702 m)   Wt 100.2 kg (220 lb 14.4 oz)   LMP 02/28/2012   SpO2 98%   BMI 34.60 kg/m²      Physical Exam  Constitutional:       General: She is not in acute distress.  Cardiovascular:      Rate and Rhythm: Normal rate and regular rhythm.   Pulmonary:      Effort: Pulmonary effort is normal. No respiratory distress.      Breath sounds: Normal breath sounds.   Abdominal:      Palpations: Abdomen is soft. There is no mass.      Tenderness: There is no abdominal tenderness.   Skin:     General: Skin is warm and dry.   Neurological:      Coordination: Coordination abnormal.       Lab Results   Component Value Date    WBC 4.71 07/13/2020    HGB 10.1 (L) 07/13/2020    HCT 33.6 (L) 07/13/2020     07/13/2020    K 5.4 (H) 07/13/2020     07/13/2020    CO2 23 07/13/2020    BUN 17 07/13/2020    CREATININE 1.3 07/13/2020    EGFRNONAA 44.1 (A) 07/13/2020    CALCIUM 10.6 (H) " 07/13/2020    PHOS 4.4 07/13/2020    MG 1.6 07/13/2020    ALBUMIN 4.2 07/13/2020    ALBUMIN 4.2 07/13/2020    AST 33 07/13/2020    ALT 34 07/13/2020    UTPCR 0.16 07/13/2020    PTH 83.0 (H) 11/21/2019    TACROLIMUS 7.4 07/13/2020     Lab Results   Component Value Date    EXTANC 1,240 04/15/2019    EXTWBC 2.0 (L) 04/15/2019    EXTSEGS 62.0 04/15/2019    EXTPLATELETS 141 04/15/2019    EXTHEMOGLOBI 8.7 (L) 04/15/2019    EXTHEMATOCRI 27.6 (L) 04/15/2019    EXTCREATININ 0.84 04/15/2019    EXTSODIUM 140 04/15/2019    EXTPOTASSIUM 4.3 04/15/2019    EXTBUN 13 04/15/2019    EXTCO2 23.0 04/15/2019    EXTCALCIUM 8.6 04/15/2019    EXTGLUCOSE 128 (H) 04/15/2019    EXTALBUMIN 3.4 04/15/2019    EXTAST 37 04/15/2019    EXTALT 35 (H) 04/15/2019    EXTBILITOTAL 0.8 04/15/2019     Labs were reviewed with the patient    Assessment:     1. Living-donor kidney transplant 1/14/2019    2. Chronic kidney disease (CKD), stage III (moderate)    3. At risk for opportunistic infections    4. Immunosuppressive management encounter following kidney transplant        Plan:   CKD IIIa-II s/p kidney transplantation,  -baseline creatinine remains excellent around 1-1.3  -she is aware to follow up with her physician locally.  -Screening for urinary abnormalities in CKD post txp: recent pyuria, now on atb. To see uroGyn in fall.  Recent Labs   Lab 12/19/19  0924 07/13/20  0938 07/15/20  1407   Protein, UA Negative 1+ A Negative   Glucose, UA Negative Negative Negative   Occult Blood UA Negative Negative Negative   Leukocytes, UA Negative 3+ A Trace A   Prot/Creat Ratio, Ur Unable to calculate 0.16  --         -BK Virus Monitoring  Lab Results   Component Value Date    BKVIRUSPCRQB <125 07/13/2020    BKVIRUSPCRQB <125 12/19/2019   -BK ND  -Continue monitoring BK PCRs per program guidelines to avoid allograft loss from BK nephropathy      H/o Aplastic anemia from parvovirus B 19 infection, resolved.    Immunosuppression Management: Continue  tacrolimus-based immunosuppression.   - Goal tacro 4-6  - Continue CellCept   - Continue monitoring for drug toxicity and med-related side effects as well as balance risk of infections vs. Rejection in this very complex case.      Chronic back pain:   Support provided.  Lifestyle modifications, exercise and weight loss were encouraged.  I advised her from the kidney transplant perspective CBD oral is fine to try given her circumstances.      Reminded to notify transplant if he develops COVID-19 or has any health concerns related to her transplant.  I reviewed safe OTC remedies she may use, and a copy of AVS was provided.    Follow-up:   Clinic: return to transplant clinic weekly for the first month after transplant; every 2 weeks during months 2-3; then at 6-, 9-, 12-, 18-, 24-, and 36- months post-transplant to reassess for complications from immunosuppression toxicity and monitor for rejection.  Annually thereafter.    Labs: since patient remains at high risk for rejection and drug-related complications that warrant close monitoring, labs will be ordered as follows: continue twice weekly CBC, renal panel, and drug level for first month; then same labs once weekly through 3rd month post-transplant.  Urine for UA and protein/creatinine ratio monthly.  Urine BK - PCR at 1-, 3-, 6-, 9-, 12-, 18-, 24-, and 36- months post-transplant.  Hepatic panel at 1-, 2-, 3-, 6-, 9-, 12-, 18-, 24-, and 36- months post-transplant.    Maureen Cabral MD        ADDENDUM:  Spoke with Dr. Kessler, who believes it is acceptable to resume CellCept with close monitoring of her white count.  Will start at low dose of 250 mg, 1 cap twice daily.  Continue serial monitoring of parvovirus PCR to ensure counts do not worsen.

## 2020-07-24 NOTE — PATIENT INSTRUCTIONS
From Dr. Watson:  It is my privilege to participate in your post-transplant care!     DOs and DON'Ts FOR TRANSPLANT PATIENTS USING OVER THE COUNTER REMEDIES    Fever or pain - Tylenol (acetaminophen). We do not recommend NSAIDS such as Motrin, Advil, Aleve,naprosen, BC powders, etc. If in doubt, please check!  Cough Suppressant - Dextromethorphan Hydrobromide 30 mg or cough drops (Halls or equivalent generic)  Nasal congestion - Saline nasal spray or Afrin nasal spray. Topical vicks vaporub or nasal inhaler is also acceptable.  (Afrin is oxymetazoline, but should ONLY USE FOR 3 DAYS). Note tablet form decongestants (Sudafed, phenylephrine) can raise blood pressure and are not recommended  Allergy symptoms - Antihistamine (Benadryl, Claritin, Zyrtec). These can also help dry up drainage.  For sore throat -  salt water gargles, Chloraseptic spray or sore throat lozenges   For thick secretions (thick mucous) - Guaifenesin (Mucinex), saline nasal spray   To prevent colds - low dose vitamin C is probably OK, but can increase risk of kidney stones. Zinc lozenges (not tablets) taken through the day may help minimize. Let the lozenge dissolve in the back of your throat. Note: Zinc can inhibit the virus from multiplying in your throat, but it is not proven to prevent colds.  Herbal and natural remedies - Some herbals are very safe and effective while others are proven to cause renal failure or interact with your medication. Do NOT take any natural or herbal remedies without discussing with transplant.     If you develop fever or shortness of breath, or start to feel worse, you need to get checked out again or go to ED, depending on the severity of your symptoms.    Dont' forget we recommend yearly influenza vaccine. It does not protect you against all infections, and you can still get the flu. It is proven to protect transplant patients from influenza related serious illness or death!      Please be sure to let us know if you  have any questions or concerns about your health care - we cannot help you if we do not know.  Don't forget we are on call 24/7 for any emergencies.   Best Wishes,  Dr. Shirley Cabral

## 2020-07-24 NOTE — LETTER
July 24, 2020        Dalton Heaton  [address]  [facility] [place]  [place] [address]  Phone: [phone]  Fax: [phone]             [facility]  [address]  [facility] [address]  Phone: [phone]   Patient: Andra Newell   MR Number: [id number]   YOB: 1960   Date of Visit: 7/24/2020       Dear Dr. Dalton Heaton    Thank you for referring Andra Newell to me for evaluation. Attached you will find relevant portions of my assessment and plan of care.    If you have questions, please do not hesitate to call me. I look forward to following Andra Newell along with you.    Sincerely,    Maureen Cabral MD    Enclosure    If you would like to receive this communication electronically, please contact [email or web address] or [phone] to request [employer] access.    [employer] is a tool which provides read-only access to select patient information with whom you have a relationship. Its easy to use and provides real time access to review your patients record including encounter summaries, notes, results, and demographic information.    If you feel you have received this communication in error or would no longer like to receive these types of communications, please e-mail [email or web address]

## 2020-07-29 LAB
B19V DNA # SPEC NAA+PROBE: ABNORMAL COPIES/ML
SPECIMEN SOURCE: ABNORMAL

## 2020-07-30 ENCOUNTER — TELEPHONE (OUTPATIENT)
Dept: TRANSPLANT | Facility: CLINIC | Age: 60
End: 2020-07-30

## 2020-07-30 NOTE — TELEPHONE ENCOUNTER
Patient repeated back and voice a understanding of orders.    ----- Message from Maureen Cabral MD sent at 7/30/2020  3:16 PM CDT -----  The following message sent to patient via [facility]: The parvovirus level has risen. I will loop in Dr. Kessler for guidance. Please stop Myfortic.

## 2020-07-31 ENCOUNTER — TELEPHONE (OUTPATIENT)
Dept: TRANSPLANT | Facility: CLINIC | Age: 60
End: 2020-07-31

## 2020-07-31 DIAGNOSIS — B34.3 PARVOVIRUS B19 INFECTION: ICD-10-CM

## 2020-07-31 DIAGNOSIS — Z94.0 KIDNEY REPLACED BY TRANSPLANT: Primary | ICD-10-CM

## 2020-07-31 NOTE — TELEPHONE ENCOUNTER
Results reviewed with patient . Next labs 8/4/2020.    ----- Message from Maureen Cabral MD sent at 7/31/2020  2:22 PM CDT -----  Q2 weeks as long as parvo is that bad: CBC, RFP, tacro. Get hepatic panel with next draw.  I cannot believe that test!!  I pray it turns out to be a mistake.    ----- Message -----  From: Oracio Hendricks RN  Sent: 7/31/2020  12:37 PM CDT  To: Maureen Cabral MD    How often do we want to do labs ?  ----- Message -----  From: Oracio Hendricks RN  Sent: 7/30/2020   4:11 PM CDT  To: Oracio Hendricks RN      ----- Message -----  From: Maureen Cabral MD  Sent: 7/30/2020   3:16 PM CDT  To: [facility] Post-Kidney Transplant Clinical    The following message sent to patient via [facility]: The parvovirus level has risen. I will loop in Dr. Kessler for guidance. Please stop Myfortic.

## 2020-08-03 DIAGNOSIS — N30.00 ACUTE CYSTITIS WITHOUT HEMATURIA: Primary | ICD-10-CM

## 2020-08-04 ENCOUNTER — LAB VISIT (OUTPATIENT)
Dept: LAB | Facility: HOSPITAL | Age: 60
End: 2020-08-04
Attending: INTERNAL MEDICINE
Payer: MEDICARE

## 2020-08-04 DIAGNOSIS — Z20.828 PARVOVIRUS EXPOSURE: ICD-10-CM

## 2020-08-04 DIAGNOSIS — Z94.0 KIDNEY REPLACED BY TRANSPLANT: ICD-10-CM

## 2020-08-04 LAB
ALBUMIN SERPL BCP-MCNC: 4.1 G/DL (ref 3.5–5.2)
ALBUMIN SERPL BCP-MCNC: 4.1 G/DL (ref 3.5–5.2)
ALP SERPL-CCNC: 113 U/L (ref 55–135)
ALT SERPL W/O P-5'-P-CCNC: 28 U/L (ref 10–44)
ANION GAP SERPL CALC-SCNC: 8 MMOL/L (ref 8–16)
AST SERPL-CCNC: 26 U/L (ref 10–40)
BASOPHILS # BLD AUTO: 0.06 K/UL (ref 0–0.2)
BASOPHILS NFR BLD: 0.9 % (ref 0–1.9)
BILIRUB DIRECT SERPL-MCNC: 0.2 MG/DL (ref 0.1–0.3)
BILIRUB SERPL-MCNC: 0.5 MG/DL (ref 0.1–1)
BUN SERPL-MCNC: 20 MG/DL (ref 8–23)
CALCIUM SERPL-MCNC: 9.4 MG/DL (ref 8.7–10.5)
CHLORIDE SERPL-SCNC: 104 MMOL/L (ref 95–110)
CO2 SERPL-SCNC: 29 MMOL/L (ref 23–29)
CREAT SERPL-MCNC: 1.1 MG/DL (ref 0.5–1.4)
DIFFERENTIAL METHOD: ABNORMAL
EOSINOPHIL # BLD AUTO: 0.8 K/UL (ref 0–0.5)
EOSINOPHIL NFR BLD: 11.6 % (ref 0–8)
ERYTHROCYTE [DISTWIDTH] IN BLOOD BY AUTOMATED COUNT: 15.7 % (ref 11.5–14.5)
EST. GFR  (AFRICAN AMERICAN): >60 ML/MIN/1.73 M^2
EST. GFR  (NON AFRICAN AMERICAN): 53.9 ML/MIN/1.73 M^2
GLUCOSE SERPL-MCNC: 156 MG/DL (ref 70–110)
HCT VFR BLD AUTO: 37.6 % (ref 37–48.5)
HGB BLD-MCNC: 11.5 G/DL (ref 12–16)
IMM GRANULOCYTES # BLD AUTO: 0.04 K/UL (ref 0–0.04)
IMM GRANULOCYTES NFR BLD AUTO: 0.6 % (ref 0–0.5)
LYMPHOCYTES # BLD AUTO: 1.2 K/UL (ref 1–4.8)
LYMPHOCYTES NFR BLD: 18 % (ref 18–48)
MAGNESIUM SERPL-MCNC: 1.4 MG/DL (ref 1.6–2.6)
MCH RBC QN AUTO: 29.3 PG (ref 27–31)
MCHC RBC AUTO-ENTMCNC: 30.6 G/DL (ref 32–36)
MCV RBC AUTO: 96 FL (ref 82–98)
MONOCYTES # BLD AUTO: 0.5 K/UL (ref 0.3–1)
MONOCYTES NFR BLD: 7.9 % (ref 4–15)
NEUTROPHILS # BLD AUTO: 4 K/UL (ref 1.8–7.7)
NEUTROPHILS NFR BLD: 61 % (ref 38–73)
NRBC BLD-RTO: 0 /100 WBC
PHOSPHATE SERPL-MCNC: 4.5 MG/DL (ref 2.7–4.5)
PLATELET # BLD AUTO: 200 K/UL (ref 150–350)
PMV BLD AUTO: 10.2 FL (ref 9.2–12.9)
POTASSIUM SERPL-SCNC: 5 MMOL/L (ref 3.5–5.1)
PROT SERPL-MCNC: 7.3 G/DL (ref 6–8.4)
RBC # BLD AUTO: 3.93 M/UL (ref 4–5.4)
SODIUM SERPL-SCNC: 141 MMOL/L (ref 136–145)
WBC # BLD AUTO: 6.62 K/UL (ref 3.9–12.7)

## 2020-08-04 PROCEDURE — 85025 COMPLETE CBC W/AUTO DIFF WBC: CPT

## 2020-08-04 PROCEDURE — 87799 DETECT AGENT NOS DNA QUANT: CPT

## 2020-08-04 PROCEDURE — 80069 RENAL FUNCTION PANEL: CPT

## 2020-08-04 PROCEDURE — 36415 COLL VENOUS BLD VENIPUNCTURE: CPT | Mod: PO

## 2020-08-04 PROCEDURE — 83735 ASSAY OF MAGNESIUM: CPT

## 2020-08-04 PROCEDURE — 80197 ASSAY OF TACROLIMUS: CPT

## 2020-08-04 PROCEDURE — 84075 ASSAY ALKALINE PHOSPHATASE: CPT

## 2020-08-05 LAB — TACROLIMUS BLD-MCNC: 5.4 NG/ML (ref 5–15)

## 2020-08-07 LAB
B19V DNA # SPEC NAA+PROBE: ABNORMAL COPIES/ML
SPECIMEN SOURCE: ABNORMAL

## 2020-08-18 ENCOUNTER — LAB VISIT (OUTPATIENT)
Dept: LAB | Facility: HOSPITAL | Age: 60
End: 2020-08-18
Attending: INTERNAL MEDICINE
Payer: MEDICARE

## 2020-08-18 DIAGNOSIS — Z20.828 PARVOVIRUS EXPOSURE: ICD-10-CM

## 2020-08-18 DIAGNOSIS — Z94.0 KIDNEY REPLACED BY TRANSPLANT: ICD-10-CM

## 2020-08-18 LAB
ALBUMIN SERPL BCP-MCNC: 4.1 G/DL (ref 3.5–5.2)
ALBUMIN SERPL BCP-MCNC: 4.1 G/DL (ref 3.5–5.2)
ALP SERPL-CCNC: 124 U/L (ref 55–135)
ALT SERPL W/O P-5'-P-CCNC: 30 U/L (ref 10–44)
ANION GAP SERPL CALC-SCNC: 8 MMOL/L (ref 8–16)
AST SERPL-CCNC: 28 U/L (ref 10–40)
BASOPHILS # BLD AUTO: 0.06 K/UL (ref 0–0.2)
BASOPHILS NFR BLD: 0.9 % (ref 0–1.9)
BILIRUB DIRECT SERPL-MCNC: 0.2 MG/DL (ref 0.1–0.3)
BILIRUB SERPL-MCNC: 0.4 MG/DL (ref 0.1–1)
BUN SERPL-MCNC: 19 MG/DL (ref 8–23)
CALCIUM SERPL-MCNC: 9.7 MG/DL (ref 8.7–10.5)
CHLORIDE SERPL-SCNC: 104 MMOL/L (ref 95–110)
CO2 SERPL-SCNC: 28 MMOL/L (ref 23–29)
CREAT SERPL-MCNC: 1.1 MG/DL (ref 0.5–1.4)
DIFFERENTIAL METHOD: ABNORMAL
EOSINOPHIL # BLD AUTO: 1 K/UL (ref 0–0.5)
EOSINOPHIL NFR BLD: 14.4 % (ref 0–8)
ERYTHROCYTE [DISTWIDTH] IN BLOOD BY AUTOMATED COUNT: 14.6 % (ref 11.5–14.5)
EST. GFR  (AFRICAN AMERICAN): >60 ML/MIN/1.73 M^2
EST. GFR  (NON AFRICAN AMERICAN): 53.9 ML/MIN/1.73 M^2
GLUCOSE SERPL-MCNC: 176 MG/DL (ref 70–110)
HCT VFR BLD AUTO: 38.5 % (ref 37–48.5)
HGB BLD-MCNC: 11.6 G/DL (ref 12–16)
IMM GRANULOCYTES # BLD AUTO: 0.03 K/UL (ref 0–0.04)
IMM GRANULOCYTES NFR BLD AUTO: 0.4 % (ref 0–0.5)
LYMPHOCYTES # BLD AUTO: 1.3 K/UL (ref 1–4.8)
LYMPHOCYTES NFR BLD: 19.4 % (ref 18–48)
MAGNESIUM SERPL-MCNC: 1.5 MG/DL (ref 1.6–2.6)
MCH RBC QN AUTO: 28.8 PG (ref 27–31)
MCHC RBC AUTO-ENTMCNC: 30.1 G/DL (ref 32–36)
MCV RBC AUTO: 96 FL (ref 82–98)
MONOCYTES # BLD AUTO: 0.5 K/UL (ref 0.3–1)
MONOCYTES NFR BLD: 7.9 % (ref 4–15)
NEUTROPHILS # BLD AUTO: 3.9 K/UL (ref 1.8–7.7)
NEUTROPHILS NFR BLD: 57 % (ref 38–73)
NRBC BLD-RTO: 0 /100 WBC
PHOSPHATE SERPL-MCNC: 4.4 MG/DL (ref 2.7–4.5)
PLATELET # BLD AUTO: 197 K/UL (ref 150–350)
PMV BLD AUTO: 10.2 FL (ref 9.2–12.9)
POTASSIUM SERPL-SCNC: 5.2 MMOL/L (ref 3.5–5.1)
PROT SERPL-MCNC: 7.4 G/DL (ref 6–8.4)
RBC # BLD AUTO: 4.03 M/UL (ref 4–5.4)
SODIUM SERPL-SCNC: 140 MMOL/L (ref 136–145)
WBC # BLD AUTO: 6.75 K/UL (ref 3.9–12.7)

## 2020-08-18 PROCEDURE — 80069 RENAL FUNCTION PANEL: CPT

## 2020-08-18 PROCEDURE — 84075 ASSAY ALKALINE PHOSPHATASE: CPT

## 2020-08-18 PROCEDURE — 83735 ASSAY OF MAGNESIUM: CPT

## 2020-08-18 PROCEDURE — 87799 DETECT AGENT NOS DNA QUANT: CPT

## 2020-08-18 PROCEDURE — 36415 COLL VENOUS BLD VENIPUNCTURE: CPT | Mod: PO

## 2020-08-18 PROCEDURE — 85025 COMPLETE CBC W/AUTO DIFF WBC: CPT

## 2020-08-18 PROCEDURE — 82247 BILIRUBIN TOTAL: CPT

## 2020-08-18 PROCEDURE — 80197 ASSAY OF TACROLIMUS: CPT

## 2020-08-19 LAB — TACROLIMUS BLD-MCNC: 6.7 NG/ML (ref 5–15)

## 2020-08-21 ENCOUNTER — TELEPHONE (OUTPATIENT)
Dept: TRANSPLANT | Facility: CLINIC | Age: 60
End: 2020-08-21

## 2020-08-21 DIAGNOSIS — E11.9 TYPE 2 DIABETES MELLITUS WITHOUT COMPLICATION: ICD-10-CM

## 2020-08-21 LAB
B19V DNA # SPEC NAA+PROBE: ABNORMAL COPIES/ML
SPECIMEN SOURCE: ABNORMAL

## 2020-08-21 NOTE — TELEPHONE ENCOUNTER
Results reviewed with patient and notified that we are awaiting further orders.    ----- Message from Maureen Cabral MD sent at 8/21/2020  1:32 PM CDT -----  The following message sent to patient via [facility]:  Your parvovirus count has risen to the level from July 24th.  I will keep Dr. Kessler updated to see if she wants to see you or give IVIG.

## 2020-09-01 ENCOUNTER — LAB VISIT (OUTPATIENT)
Dept: LAB | Facility: HOSPITAL | Age: 60
End: 2020-09-01
Attending: INTERNAL MEDICINE
Payer: MEDICARE

## 2020-09-01 DIAGNOSIS — Z94.0 KIDNEY REPLACED BY TRANSPLANT: ICD-10-CM

## 2020-09-01 DIAGNOSIS — Z20.828 PARVOVIRUS EXPOSURE: ICD-10-CM

## 2020-09-01 LAB
ALBUMIN SERPL BCP-MCNC: 4.3 G/DL (ref 3.5–5.2)
ALBUMIN SERPL BCP-MCNC: 4.3 G/DL (ref 3.5–5.2)
ALP SERPL-CCNC: 123 U/L (ref 55–135)
ALT SERPL W/O P-5'-P-CCNC: 32 U/L (ref 10–44)
ANION GAP SERPL CALC-SCNC: 11 MMOL/L (ref 8–16)
AST SERPL-CCNC: 31 U/L (ref 10–40)
BASOPHILS # BLD AUTO: 0.04 K/UL (ref 0–0.2)
BASOPHILS NFR BLD: 0.5 % (ref 0–1.9)
BILIRUB DIRECT SERPL-MCNC: 0.2 MG/DL (ref 0.1–0.3)
BILIRUB SERPL-MCNC: 0.3 MG/DL (ref 0.1–1)
BUN SERPL-MCNC: 20 MG/DL (ref 8–23)
CALCIUM SERPL-MCNC: 9.8 MG/DL (ref 8.7–10.5)
CHLORIDE SERPL-SCNC: 104 MMOL/L (ref 95–110)
CO2 SERPL-SCNC: 27 MMOL/L (ref 23–29)
CREAT SERPL-MCNC: 1.1 MG/DL (ref 0.5–1.4)
DIFFERENTIAL METHOD: ABNORMAL
EOSINOPHIL # BLD AUTO: 1.1 K/UL (ref 0–0.5)
EOSINOPHIL NFR BLD: 14.3 % (ref 0–8)
ERYTHROCYTE [DISTWIDTH] IN BLOOD BY AUTOMATED COUNT: 14.6 % (ref 11.5–14.5)
EST. GFR  (AFRICAN AMERICAN): >60 ML/MIN/1.73 M^2
EST. GFR  (NON AFRICAN AMERICAN): 53.9 ML/MIN/1.73 M^2
GLUCOSE SERPL-MCNC: 170 MG/DL (ref 70–110)
HCT VFR BLD AUTO: 39.3 % (ref 37–48.5)
HGB BLD-MCNC: 12.1 G/DL (ref 12–16)
IMM GRANULOCYTES # BLD AUTO: 0.04 K/UL (ref 0–0.04)
IMM GRANULOCYTES NFR BLD AUTO: 0.5 % (ref 0–0.5)
LYMPHOCYTES # BLD AUTO: 1.3 K/UL (ref 1–4.8)
LYMPHOCYTES NFR BLD: 16.7 % (ref 18–48)
MAGNESIUM SERPL-MCNC: 1.4 MG/DL (ref 1.6–2.6)
MCH RBC QN AUTO: 29.4 PG (ref 27–31)
MCHC RBC AUTO-ENTMCNC: 30.8 G/DL (ref 32–36)
MCV RBC AUTO: 95 FL (ref 82–98)
MONOCYTES # BLD AUTO: 0.6 K/UL (ref 0.3–1)
MONOCYTES NFR BLD: 7.5 % (ref 4–15)
NEUTROPHILS # BLD AUTO: 4.5 K/UL (ref 1.8–7.7)
NEUTROPHILS NFR BLD: 60.5 % (ref 38–73)
NRBC BLD-RTO: 0 /100 WBC
PHOSPHATE SERPL-MCNC: 4.9 MG/DL (ref 2.7–4.5)
PLATELET # BLD AUTO: 209 K/UL (ref 150–350)
PMV BLD AUTO: 10 FL (ref 9.2–12.9)
POTASSIUM SERPL-SCNC: 4.7 MMOL/L (ref 3.5–5.1)
PROT SERPL-MCNC: 7.5 G/DL (ref 6–8.4)
RBC # BLD AUTO: 4.12 M/UL (ref 4–5.4)
SODIUM SERPL-SCNC: 142 MMOL/L (ref 136–145)
WBC # BLD AUTO: 7.47 K/UL (ref 3.9–12.7)

## 2020-09-01 PROCEDURE — 80069 RENAL FUNCTION PANEL: CPT

## 2020-09-01 PROCEDURE — 36415 COLL VENOUS BLD VENIPUNCTURE: CPT | Mod: PO

## 2020-09-01 PROCEDURE — 85025 COMPLETE CBC W/AUTO DIFF WBC: CPT

## 2020-09-01 PROCEDURE — 87799 DETECT AGENT NOS DNA QUANT: CPT

## 2020-09-01 PROCEDURE — 84075 ASSAY ALKALINE PHOSPHATASE: CPT

## 2020-09-01 PROCEDURE — 80197 ASSAY OF TACROLIMUS: CPT

## 2020-09-01 PROCEDURE — 83735 ASSAY OF MAGNESIUM: CPT

## 2020-09-02 LAB — TACROLIMUS BLD-MCNC: 5.9 NG/ML (ref 5–15)

## 2020-09-06 LAB
B19V DNA # SPEC NAA+PROBE: ABNORMAL COPIES/ML
SPECIMEN SOURCE: ABNORMAL

## 2020-09-08 ENCOUNTER — PATIENT MESSAGE (OUTPATIENT)
Dept: TRANSPLANT | Facility: CLINIC | Age: 60
End: 2020-09-08

## 2020-09-08 DIAGNOSIS — Z94.0 KIDNEY REPLACED BY TRANSPLANT: Primary | ICD-10-CM

## 2020-09-08 RX ORDER — TACROLIMUS 0.5 MG/1
1 CAPSULE ORAL EVERY 12 HOURS
Qty: 120 CAPSULE | Refills: 11 | Status: SHIPPED | OUTPATIENT
Start: 2020-09-08 | End: 2020-09-29 | Stop reason: SDUPTHER

## 2020-09-08 NOTE — TELEPHONE ENCOUNTER
Results reviewed with patient and will repeat labs on 9/15/2020 as previously scheduled.    ----- Message from Maureen Cabral MD sent at 9/6/2020  2:21 PM CDT -----  The following message sent to patient via [facility]: Despite parvovirus level , your blood count remains stable. Will share with Dr. Kessler and continue checking every 2 weeks. Decrease tacrolimus from 1.5/1 to 1 mg twice daily. Also, remain off Myfortic. We will alert you to any other changes needed after discussing with Dr. Kessler.

## 2020-09-08 NOTE — PATIENT INSTRUCTIONS
Reply sent to patient, in response to her comment that we are watching parvo PCR climb and not doing anything.:    Minor Silverman,  I can understand your frustration. Keep in mid that result came in over the holiday weekend; I was on call so I checked my results Sunday. Otherwise, I may not have seen it until today. I've already alerted Dr. Kessler since I am concerned.   And keep in mind the main treatment for these viral infections is to reduce immunosuppression, which we did recently, and it can take 2-3 months to see the effect.  Also, the way this virus causes harm is by causing severe anemia, which you do not have.   Lastly, the treatment can have side effects (and even rarely cause kidney problems), so we don't want to treat unless we have indications.  I will also share this message with Dr. Kessler and see what she suggests.  Maureen Cabral MD

## 2020-09-09 ENCOUNTER — PATIENT OUTREACH (OUTPATIENT)
Dept: ADMINISTRATIVE | Facility: OTHER | Age: 60
End: 2020-09-09

## 2020-09-09 ENCOUNTER — OFFICE VISIT (OUTPATIENT)
Dept: INFECTIOUS DISEASES | Facility: CLINIC | Age: 60
End: 2020-09-09
Payer: MEDICARE

## 2020-09-09 DIAGNOSIS — Z79.899 LONG TERM CURRENT USE OF IMMUNOSUPPRESSIVE DRUG: ICD-10-CM

## 2020-09-09 DIAGNOSIS — M47.896 OTHER SPONDYLOSIS, LUMBAR REGION: ICD-10-CM

## 2020-09-09 DIAGNOSIS — Z91.89 AT RISK FOR OPPORTUNISTIC INFECTIONS: ICD-10-CM

## 2020-09-09 DIAGNOSIS — Z94.0 KIDNEY REPLACED BY TRANSPLANT: Primary | ICD-10-CM

## 2020-09-09 DIAGNOSIS — B34.3 PARVOVIRUS B19 INFECTION: ICD-10-CM

## 2020-09-09 PROCEDURE — 99215 PR OFFICE/OUTPT VISIT, EST, LEVL V, 40-54 MIN: ICD-10-PCS | Mod: 95,,, | Performed by: INTERNAL MEDICINE

## 2020-09-09 PROCEDURE — 99215 OFFICE O/P EST HI 40 MIN: CPT | Mod: 95,,, | Performed by: INTERNAL MEDICINE

## 2020-09-09 NOTE — PROGRESS NOTES
Care Everywhere: updated  Immunization: updated  Health Maintenance: updated  Media Review: review for outside eye exam report   Legacy Review:   Order placed:   Upcoming appts:  efax sent to Dr. Tejada office to possibly obtain eye exam report

## 2020-09-09 NOTE — LETTER
AUTHORIZATION FOR RELEASE OF   CONFIDENTIAL INFORMATION    Dear Jeannette Tejada OD,    We are seeing Andra Newell, date of birth 1960, in the clinic at [facility]. Gay Tong NP is the patient's PCP. Andra Newell has an outstanding lab/procedure at the time we reviewed her chart. In order to help keep her health information updated, she has authorized us to request the following medical record(s):        (  )  MAMMOGRAM                                      (  )  COLONOSCOPY      (  )  PAP SMEAR                                          (  )  OUTSIDE LAB RESULTS     (  )  DEXA SCAN                                          ( x )  EYE EXAM            (  )  FOOT EXAM                                          (  )  ENTIRE RECORD     (  )  OUTSIDE IMMUNIZATIONS                 (  )  _______________         Please fax records to [facility], Lien N Fradella, NP, [phone]     If you have any questions, please contact Jannet Luciano at [phone].           Patient Name: Andra Newell  : 1960  Patient Phone #: [phone]

## 2020-09-09 NOTE — PROGRESS NOTES
The patient location is: Home  The chief complaint leading to consultation is: Follow-up for parvovirus    Visit type: audiovisual    Face to Face time with patient: 25  35 minutes of total time spent on the encounter, which includes face to face time and non-face to face time preparing to see the patient (eg, review of tests), Obtaining and/or reviewing separately obtained history, Documenting clinical information in the electronic or other health record, Independently interpreting results (not separately reported) and communicating results to the patient/family/caregiver, or Care coordination (not separately reported).         Each patient to whom he or she provides medical services by telemedicine is:  (1) informed of the relationship between the physician and patient and the respective role of any other health care provider with respect to management of the patient; and (2) notified that he or she may decline to receive medical services by telemedicine and may withdraw from such care at any time.    Notes:       Subjective:      Patient ID: Andra Newell is a 60 y.o. female.    Chief Complaint: Follow-up for parvovirus    History of Present Illness  60-year-old female with history of HTN, HLD, T2DM c/b ESRD s/p living kidney transplant 1/14/2019 CMV D+/R- alemtuzumab induction, on tacro MMF, removed from letermivir/acyclovir vs valganciclovir CMV prevention trial due to leukopenia, recurrent ESBL Kleb UTI presents for follow-up of parvovirus infection.    Summary of illness:  In 3/2019, patient with recurrent admissions for fevers, diarrhea, ESLB Kleb UTIs, transfusion dependent anemia. In April, patient with positive parvovirus PCR but treatment held off given stable anemia.  Patient presented for admission early 5/2019 for fevers, malaise, fatigue, diarrhea.  Patient received course of IVIg for parvovirus.  Patient also underwent endoscopy and colonoscopy for work-up of persistent diarrhea which were  unremarkable.  Patient was also taken out of letermivir study given persistent leukopenia.  MMF and tacro were also decreased.     Subjective:  Patient was last seen in ID 6/2019. Patient has been doing well. She had routine labs drawn 7/2020. At the time, Hgb noted to be decreased from 11.8 on 5/12/2020 to 10.1 on 7/13/2020. Parvo virus PCR was obtained, found to be elevated. Patient immunosuppression decreased.  Labs have been monitored every 2 weeks, noted to have rise in Parvo B19 levels, but CBC, CMP all normal.    Patient reports she started to feel fatigue with shortness of breath about 3-4 weeks ago. Otherwise denied any fevers, chills, sweats, nvd, abdominal pain, SOB, cough, CP.  Recent UTI treated.    Patient reports having back pain, recently increase in pain medications, fatigue may be related to medications.    Review of Systems   Constitutional: Positive for malaise/fatigue. Negative for chills, diaphoresis, fever and weight loss.   HENT: Negative for congestion, sinus pain and sore throat.    Eyes: Negative for photophobia and pain.   Respiratory: Positive for shortness of breath. Negative for cough and sputum production.    Cardiovascular: Negative for chest pain and leg swelling.   Gastrointestinal: Negative for abdominal pain, diarrhea, nausea and vomiting.   Genitourinary: Negative for dysuria and hematuria.   Musculoskeletal: Negative for joint pain.   Skin: Negative for rash.   Neurological: Negative for focal weakness and headaches.   Psychiatric/Behavioral: Negative for depression. The patient is not nervous/anxious.          Objective:   Physical Exam  Vitals signs reviewed.   Constitutional:       General: She is not in acute distress.     Appearance: She is well-developed. She is obese. She is not ill-appearing, toxic-appearing or diaphoretic.   HENT:      Head: Normocephalic and atraumatic.   Eyes:      Conjunctiva/sclera: Conjunctivae normal.   Neck:      Musculoskeletal: Normal range of  motion and neck supple.   Pulmonary:      Effort: Pulmonary effort is normal. No respiratory distress.   Abdominal:      General: There is no distension.      Palpations: Abdomen is soft.   Musculoskeletal: Normal range of motion.   Skin:     General: Skin is warm and dry.      Findings: No erythema or rash.   Neurological:      Mental Status: She is alert and oriented to person, place, and time.   Psychiatric:         Behavior: Behavior normal.           Significant results reviewed:    Component      Latest Ref Rng & Units 9/1/2020 8/18/2020           9:22 AM  9:06 AM   Glucose      70 - 110 mg/dL 170 (H) 176 (H)   Sodium      136 - 145 mmol/L 142 140   Potassium      3.5 - 5.1 mmol/L 4.7 5.2 (H)   Chloride      95 - 110 mmol/L 104 104   CO2      23 - 29 mmol/L 27 28   BUN, Bld      8 - 23 mg/dL 20 19   Calcium      8.7 - 10.5 mg/dL 9.8 9.7   Creatinine      0.5 - 1.4 mg/dL 1.1 1.1   Albumin      3.5 - 5.2 g/dL 4.3 4.1   Phosphorus      2.7 - 4.5 mg/dL 4.9 (H) 4.4   eGFR if African American      >60 mL/min/1.73 m:2 >60.0 >60.0   eGFR if non African American      >60 mL/min/1.73 m:2 53.9 (A) 53.9 (A)   Anion Gap      8 - 16 mmol/L 11 8     Component      Latest Ref Rng & Units 8/4/2020 7/24/2020           8:55 AM    Glucose      70 - 110 mg/dL 156 (H) 139 (H)   Sodium      136 - 145 mmol/L 141 144   Potassium      3.5 - 5.1 mmol/L 5.0 5.3 (H)   Chloride      95 - 110 mmol/L 104 107   CO2      23 - 29 mmol/L 29 27   BUN, Bld      8 - 23 mg/dL 20 18   Calcium      8.7 - 10.5 mg/dL 9.4 10.2   Creatinine      0.5 - 1.4 mg/dL 1.1 1.0   Albumin      3.5 - 5.2 g/dL 4.1 4.2   Phosphorus      2.7 - 4.5 mg/dL 4.5 4.3   eGFR if African American      >60 mL/min/1.73 m:2 >60.0 >60.0   eGFR if non African American      >60 mL/min/1.73 m:2 53.9 (A) >60.0   Anion Gap      8 - 16 mmol/L 8 10       Component      Latest Ref Rng & Units 9/1/2020 8/18/2020 8/4/2020 7/24/2020                WBC      3.90 - 12.70 K/uL 7.47 6.75 6.62  6.85   RBC      4.00 - 5.40 M/uL 4.12 4.03 3.93 (L) 3.68 (L)   Hemoglobin      12.0 - 16.0 g/dL 12.1 11.6 (L) 11.5 (L) 10.8 (L)   Hematocrit      37.0 - 48.5 % 39.3 38.5 37.6 35.5 (L)   MCV      82 - 98 fL 95 96 96 97   MCH      27.0 - 31.0 pg 29.4 28.8 29.3 29.3   MCHC      32.0 - 36.0 g/dL 30.8 (L) 30.1 (L) 30.6 (L) 30.4 (L)   RDW      11.5 - 14.5 % 14.6 (H) 14.6 (H) 15.7 (H) 17.3 (H)   Platelets      150 - 350 K/uL 209 197 200 222   MPV      9.2 - 12.9 fL 10.0 10.2 10.2 9.6   Immature Granulocytes      0.0 - 0.5 % 0.5 0.4 0.6 (H) 0.4   Immature Grans (Abs)      0.00 - 0.04 K/uL 0.04 0.03 0.04 0.03   Lymph #      1.0 - 4.8 K/uL 1.3 1.3 1.2 1.1   Mono #      0.3 - 1.0 K/uL 0.6 0.5 0.5 0.6   Eos #      0.0 - 0.5 K/uL 1.1 (H) 1.0 (H) 0.8 (H) 0.7 (H)   Baso #      0.00 - 0.20 K/uL 0.04 0.06 0.06 0.05   nRBC      0 /100 WBC 0 0 0 0   Gran%      38.0 - 73.0 % 60.5 57.0 61.0 64.3   Lymph%      18.0 - 48.0 % 16.7 (L) 19.4 18.0 15.9 (L)   Mono%      4.0 - 15.0 % 7.5 7.9 7.9 8.9   Eosinophil%      0.0 - 8.0 % 14.3 (H) 14.4 (H) 11.6 (H) 9.8 (H)   Basophil%      0.0 - 1.9 % 0.5 0.9 0.9 0.7         Component      Latest Ref Rng & Units 9/1/2020 8/18/2020 8/4/2020 7/24/2020   Parvovirus Source       PLASMA PLASMA BLOOD PLASMA   Parvovirus B19 DNA, Quant      copies/mL 626384 (H) 681349 (H) 03574 (H) 002699 (H)       Assessment:   59-year-old female with history of chronic back pain, HTN, HLD, T2DM c/b ESRD s/p living kidney transplant 1/14/2019 CMV D+/R- alemtuzumab induction, on tacro MMF, removed from letermivir/acyclovir vs valganciclovir CMV prevention trial due to leukopenia, recurrent ESBL Kleb UTI, presents for follow-up of Parvo B19 virus.    Patient with prior history of Parvo B19 virus infection with subsequent anemia, diarrhea, resolved with course of IVIg.  Parvo B19 VL checked due to anemia, found to be elevated. Patient otherwise feeling well, notes fatigue and SOB, but CBC, kidney, and liver function all  normal.    Plan:   - Continue to monitor [id number] levels, CBC with diff, CMP q2 weeks  - If patient become symptomatic with fevers, hepatitis, pneumonia, myocarditis, anemia, pancytopenia, would consider treatment with IVIG.    Cammie Kessler MD MPH  Infectious Diseases [facility]

## 2020-09-17 ENCOUNTER — LAB VISIT (OUTPATIENT)
Dept: LAB | Facility: HOSPITAL | Age: 60
End: 2020-09-17
Attending: INTERNAL MEDICINE
Payer: MEDICARE

## 2020-09-17 DIAGNOSIS — Z94.0 KIDNEY REPLACED BY TRANSPLANT: ICD-10-CM

## 2020-09-17 DIAGNOSIS — Z20.828 PARVOVIRUS EXPOSURE: ICD-10-CM

## 2020-09-17 LAB
ALBUMIN SERPL BCP-MCNC: 4.2 G/DL (ref 3.5–5.2)
ALBUMIN SERPL BCP-MCNC: 4.2 G/DL (ref 3.5–5.2)
ALP SERPL-CCNC: 104 U/L (ref 55–135)
ALT SERPL W/O P-5'-P-CCNC: 32 U/L (ref 10–44)
ANION GAP SERPL CALC-SCNC: 11 MMOL/L (ref 8–16)
AST SERPL-CCNC: 35 U/L (ref 10–40)
BASOPHILS # BLD AUTO: 0.05 K/UL (ref 0–0.2)
BASOPHILS NFR BLD: 0.5 % (ref 0–1.9)
BILIRUB DIRECT SERPL-MCNC: 0.2 MG/DL (ref 0.1–0.3)
BILIRUB SERPL-MCNC: 0.4 MG/DL (ref 0.1–1)
BUN SERPL-MCNC: 13 MG/DL (ref 8–23)
CALCIUM SERPL-MCNC: 9.6 MG/DL (ref 8.7–10.5)
CHLORIDE SERPL-SCNC: 102 MMOL/L (ref 95–110)
CO2 SERPL-SCNC: 28 MMOL/L (ref 23–29)
CREAT SERPL-MCNC: 1 MG/DL (ref 0.5–1.4)
DIFFERENTIAL METHOD: ABNORMAL
EOSINOPHIL # BLD AUTO: 1 K/UL (ref 0–0.5)
EOSINOPHIL NFR BLD: 10.6 % (ref 0–8)
ERYTHROCYTE [DISTWIDTH] IN BLOOD BY AUTOMATED COUNT: 13.9 % (ref 11.5–14.5)
EST. GFR  (AFRICAN AMERICAN): >60 ML/MIN/1.73 M^2
EST. GFR  (NON AFRICAN AMERICAN): >60 ML/MIN/1.73 M^2
GLUCOSE SERPL-MCNC: 165 MG/DL (ref 70–110)
HCT VFR BLD AUTO: 39.9 % (ref 37–48.5)
HGB BLD-MCNC: 12.2 G/DL (ref 12–16)
IMM GRANULOCYTES # BLD AUTO: 0.03 K/UL (ref 0–0.04)
IMM GRANULOCYTES NFR BLD AUTO: 0.3 % (ref 0–0.5)
LYMPHOCYTES # BLD AUTO: 1.2 K/UL (ref 1–4.8)
LYMPHOCYTES NFR BLD: 12.6 % (ref 18–48)
MAGNESIUM SERPL-MCNC: 1.5 MG/DL (ref 1.6–2.6)
MCH RBC QN AUTO: 28.7 PG (ref 27–31)
MCHC RBC AUTO-ENTMCNC: 30.6 G/DL (ref 32–36)
MCV RBC AUTO: 94 FL (ref 82–98)
MONOCYTES # BLD AUTO: 0.5 K/UL (ref 0.3–1)
MONOCYTES NFR BLD: 5.5 % (ref 4–15)
NEUTROPHILS # BLD AUTO: 6.5 K/UL (ref 1.8–7.7)
NEUTROPHILS NFR BLD: 70.5 % (ref 38–73)
NRBC BLD-RTO: 0 /100 WBC
PHOSPHATE SERPL-MCNC: 4.6 MG/DL (ref 2.7–4.5)
PLATELET # BLD AUTO: 214 K/UL (ref 150–350)
PMV BLD AUTO: 10.3 FL (ref 9.2–12.9)
POTASSIUM SERPL-SCNC: 4.7 MMOL/L (ref 3.5–5.1)
PROT SERPL-MCNC: 7.5 G/DL (ref 6–8.4)
RBC # BLD AUTO: 4.25 M/UL (ref 4–5.4)
SODIUM SERPL-SCNC: 141 MMOL/L (ref 136–145)
WBC # BLD AUTO: 9.15 K/UL (ref 3.9–12.7)

## 2020-09-17 PROCEDURE — 87799 DETECT AGENT NOS DNA QUANT: CPT

## 2020-09-17 PROCEDURE — 80197 ASSAY OF TACROLIMUS: CPT

## 2020-09-17 PROCEDURE — 36415 COLL VENOUS BLD VENIPUNCTURE: CPT | Mod: PO

## 2020-09-17 PROCEDURE — 80069 RENAL FUNCTION PANEL: CPT

## 2020-09-17 PROCEDURE — 83735 ASSAY OF MAGNESIUM: CPT

## 2020-09-17 PROCEDURE — 84075 ASSAY ALKALINE PHOSPHATASE: CPT

## 2020-09-17 PROCEDURE — 85025 COMPLETE CBC W/AUTO DIFF WBC: CPT

## 2020-09-18 DIAGNOSIS — E11.9 TYPE 2 DIABETES MELLITUS WITHOUT COMPLICATION, UNSPECIFIED WHETHER LONG TERM INSULIN USE: ICD-10-CM

## 2020-09-18 LAB — TACROLIMUS BLD-MCNC: 5.6 NG/ML (ref 5–15)

## 2020-09-22 ENCOUNTER — DOCUMENTATION ONLY (OUTPATIENT)
Dept: PRIMARY CARE CLINIC | Facility: CLINIC | Age: 60
End: 2020-09-22

## 2020-09-22 ENCOUNTER — OFFICE VISIT (OUTPATIENT)
Dept: PRIMARY CARE CLINIC | Facility: CLINIC | Age: 60
End: 2020-09-22
Payer: MEDICARE

## 2020-09-22 ENCOUNTER — PATIENT MESSAGE (OUTPATIENT)
Dept: TRANSPLANT | Facility: CLINIC | Age: 60
End: 2020-09-22

## 2020-09-22 VITALS
WEIGHT: 222.88 LBS | SYSTOLIC BLOOD PRESSURE: 130 MMHG | TEMPERATURE: 98 F | HEART RATE: 74 BPM | BODY MASS INDEX: 34.98 KG/M2 | HEIGHT: 67 IN | DIASTOLIC BLOOD PRESSURE: 80 MMHG | OXYGEN SATURATION: 97 %

## 2020-09-22 DIAGNOSIS — E11.69 MIXED DIABETIC HYPERLIPIDEMIA ASSOCIATED WITH TYPE 2 DIABETES MELLITUS: Primary | ICD-10-CM

## 2020-09-22 DIAGNOSIS — Z79.4 CONTROLLED TYPE 2 DIABETES MELLITUS WITH COMPLICATION, WITH LONG-TERM CURRENT USE OF INSULIN: ICD-10-CM

## 2020-09-22 DIAGNOSIS — M48.062 LUMBAR STENOSIS WITH NEUROGENIC CLAUDICATION: ICD-10-CM

## 2020-09-22 DIAGNOSIS — J45.20 MILD INTERMITTENT ASTHMA WITHOUT COMPLICATION: ICD-10-CM

## 2020-09-22 DIAGNOSIS — E55.9 VITAMIN D DEFICIENCY: ICD-10-CM

## 2020-09-22 DIAGNOSIS — E11.8 CONTROLLED TYPE 2 DIABETES MELLITUS WITH COMPLICATION, WITH LONG-TERM CURRENT USE OF INSULIN: ICD-10-CM

## 2020-09-22 DIAGNOSIS — E78.2 MIXED DIABETIC HYPERLIPIDEMIA ASSOCIATED WITH TYPE 2 DIABETES MELLITUS: Primary | ICD-10-CM

## 2020-09-22 PROBLEM — N18.4 STAGE 4 CHRONIC KIDNEY DISEASE: Status: ACTIVE | Noted: 2020-09-22

## 2020-09-22 PROBLEM — N18.4 STAGE 4 CHRONIC KIDNEY DISEASE: Status: RESOLVED | Noted: 2020-09-22 | Resolved: 2020-09-22

## 2020-09-22 PROCEDURE — 3051F PR MOST RECENT HEMOGLOBIN A1C LEVEL 7.0 - < 8.0%: ICD-10-PCS | Mod: CPTII,S$GLB,, | Performed by: NURSE PRACTITIONER

## 2020-09-22 PROCEDURE — 3008F BODY MASS INDEX DOCD: CPT | Mod: CPTII,S$GLB,, | Performed by: NURSE PRACTITIONER

## 2020-09-22 PROCEDURE — 3075F SYST BP GE 130 - 139MM HG: CPT | Mod: CPTII,S$GLB,, | Performed by: NURSE PRACTITIONER

## 2020-09-22 PROCEDURE — 3051F HG A1C>EQUAL 7.0%<8.0%: CPT | Mod: CPTII,S$GLB,, | Performed by: NURSE PRACTITIONER

## 2020-09-22 PROCEDURE — 3008F PR BODY MASS INDEX (BMI) DOCUMENTED: ICD-10-PCS | Mod: CPTII,S$GLB,, | Performed by: NURSE PRACTITIONER

## 2020-09-22 PROCEDURE — 3079F DIAST BP 80-89 MM HG: CPT | Mod: CPTII,S$GLB,, | Performed by: NURSE PRACTITIONER

## 2020-09-22 PROCEDURE — 99215 OFFICE O/P EST HI 40 MIN: CPT | Mod: S$GLB,,, | Performed by: NURSE PRACTITIONER

## 2020-09-22 PROCEDURE — 3079F PR MOST RECENT DIASTOLIC BLOOD PRESSURE 80-89 MM HG: ICD-10-PCS | Mod: CPTII,S$GLB,, | Performed by: NURSE PRACTITIONER

## 2020-09-22 PROCEDURE — 3075F PR MOST RECENT SYSTOLIC BLOOD PRESS GE 130-139MM HG: ICD-10-PCS | Mod: CPTII,S$GLB,, | Performed by: NURSE PRACTITIONER

## 2020-09-22 PROCEDURE — 99215 PR OFFICE/OUTPT VISIT, EST, LEVL V, 40-54 MIN: ICD-10-PCS | Mod: S$GLB,,, | Performed by: NURSE PRACTITIONER

## 2020-09-22 RX ORDER — INSULIN HUMAN 500 [IU]/ML
INJECTION, SOLUTION SUBCUTANEOUS
Qty: 6 SYRINGE | Refills: 11 | Status: SHIPPED | OUTPATIENT
Start: 2020-09-22 | End: 2020-10-06

## 2020-09-22 RX ORDER — TIZANIDINE 2 MG/1
15 TABLET ORAL NIGHTLY
COMMUNITY
End: 2020-11-17 | Stop reason: DRUGHIGH

## 2020-09-22 RX ORDER — INSULIN DEGLUDEC 200 U/ML
50 INJECTION, SOLUTION SUBCUTANEOUS DAILY
Qty: 5 SYRINGE | Refills: 6 | Status: SHIPPED | OUTPATIENT
Start: 2020-09-22 | End: 2020-10-20

## 2020-09-22 RX ORDER — FERROUS SULFATE, DRIED 160(50) MG
1 TABLET, EXTENDED RELEASE ORAL DAILY
COMMUNITY
End: 2022-02-01

## 2020-09-22 NOTE — ASSESSMENT & PLAN NOTE
Followed by neuro and pain mgmt  - Recommend conservative management by neurosurgery given no neurologic deficit and surgical intervention may not help her axial back pain.   - failed epidural steroid injection   - cannabis too expensive, considering pain pump.   - pain goal 3/10, today 5/10.   - encourage to restart aqua therapy

## 2020-09-22 NOTE — ASSESSMENT & PLAN NOTE
Followed by endocrinology  - A1c 7.0 (05/2020)  - Pt with significant hypoglycemia between 3am-6am and significant hyperglycemia between 1am-3am. - Currently on Humulin R [id number] units in am and 40 or 30 units in pm depending on bs >150<150.   - will try tresiba and wean off Humulin R U-500. Start tresiba 50 units in am with breakfast, take Humulin R U-500 30 units with lunch and dinner. Call for bs <80.

## 2020-09-22 NOTE — PROGRESS NOTES
Primary Care Provider Appointment    Subjective:      Patient ID: Andra Newell is a 60 y.o. female  with history of HTN, HLD, T2DM c/b ESRD s/p living L kidney transplant 1/14/2019, On immunosupressant, DJD, Asthma, hypothyroidism, HPT    Chief Complaint: Follow-up    Pt here for routine follow-up. Pt with prior history of Parvo B19 virus infection, found to be elevated. Pending repeat lab results with ID. Reports fatigue and chronic back pain, but otherwise feeling fine.     Pt considering implanting lumbar pain pump. Reports cannabis was not an option due to cost. Has psychology evaluation tomorrow but does not plan on doing pain pump procedure until parvo virus cleared.     DM graphs on phone application reviewed.   Pt with significant hypoglycemia between 3am-6am and significant hyperglycemia between 1am-3am. Currently on Humulin R [id number] units in am and 40 or 30 units in pm depending on bs >150<150.     Pt needs to clear flu vaccine with ID and transplant.     Followed by:  Infectious Disease: Dr. Cammie Kessler. Last seen 9/9/2020. Followed for prior history of Parvo B19 virus infection with subsequent anemia, diarrhea, resolved with course of IVIg.  Parvo B19 VL checked due to anemia, found to be elevated. Patient otherwise feeling well, notes fatigue and SOB, but CBC, kidney, and liver function all normal. Continue to monitor NldqvH10 levels, CBC with diff, CMP q2 weeks. If patient become symptomatic with fevers, hepatitis, pneumonia, myocarditis, anemia, pancytopenia, would consider treatment with IVIG.  Transplant: Dr. Maureen Cabral. Last seen 7/24/2020. Cont CellCept with close monitoring of her white count; low dose of 250 mg, 1 cap twice daily. Continue monitoring for drug toxicity and med-related side effects as well as balance risk of infections vs. Rejection in this very complex case.  Pain Mgmt: Dr. Ruben Nunez. Last seen 05/2020. Next visit 07/20, considering cannabis vs pain  pump.  Optometrist: Dr. Arias. Recently had annual exam, records requested.   Endocrinology: Mana Webber NP. Last seen 1/16/20. Change U500 kwikpen 120 breakfast, 20 u dinner  - needs some overnight coverage. U500 acts as both a basal and prandial insulin and has  A 1/2 life similar to NPH. Log in 1 week or sooner for continued hypos  Neurology: Dr. Ashish Walden. Last seen 7/29/19. F/u post renal transplant for neurogenic claudication secondary to lumbar stenosis. Has a left side achilles tendon rupture w/o repair. Her gait instability is a combination of both problems. Advised to see back and spine surgery for planning for surgery. Referred to [facility]  Prognostically. f/u with neurology in 3 months.  Urology: Dr. Jacqui Artis. Last seen 4/10/19. Followed for recurrent UTI and urgency. Urethroscopy:  Normal.  Cystoscopy:  Normal bladder mucosa, right dome implanted ureter noted with + efflux. RTC prn   Nephrology: Dr. Heaton: Last visit 05/2019, RTC 01/2020. Will request records.   Psychiatry: Dr. Sheth: Last seen 10/2019. No changes per pt. RTC in 3 mths.   Therapist: Mague Muniz. Sees weekly for CBT.     Past Surgical History:   Procedure Laterality Date    ACHILLES TENDON SURGERY Left May 2015    BACK SURGERY      CHOLECYSTECTOMY      COLONOSCOPY N/A 9/6/2017    Procedure: COLONOSCOPY;  Surgeon: Marisol Bryson MD;  Location: [facility];  Service: Endoscopy;  Laterality: N/A; REPEAT IN 3 YEARS FOR SURVEILLANCE    COLONOSCOPY N/A 5/9/2019    Procedure: COLONOSCOPY;  Surgeon: Fady Ewing MD;  [facility]  Service: Endoscopy;  Laterality: N/A;    DIALYSIS FISTULA CREATION  12/2017    ESOPHAGOGASTRODUODENOSCOPY N/A 5/9/2019    Procedure: EGD (ESOPHAGOGASTRODUODENOSCOPY);  Surgeon: Fady Ewing MD;  Location: Psychiatric (2ND FLR);  Service: Endoscopy;  Laterality: N/A;    HEMORRHOID SURGERY      INJECTION OF ANESTHETIC AGENT AROUND MEDIAL BRANCH NERVES INNERVATING  LUMBAR FACET JOINT Right 9/25/2019    Procedure: Block-nerve-medial branch-lumbar;  Surgeon: Yonny Ferrer MD;  Location: [facility];  Service: Pain Management;  Laterality: Right;  L2, 3, 4,5     INJECTION OF ANESTHETIC AGENT AROUND MEDIAL BRANCH NERVES INNERVATING LUMBAR FACET JOINT Right 10/16/2019    Procedure: Block-nerve-medial branch-lumbar;  Surgeon: Yonny Ferrer MD;  Location: [facility];  Service: Pain Management;  Laterality: Right;  L2, 3, 4,5     JOINT REPLACEMENT      left    KIDNEY TRANSPLANT N/A 1/14/2019    Procedure: TRANSPLANT, KIDNEY;  Surgeon: Roland Salazar MD;  Location: [facility] OR 2ND FLR;  Service: Transplant;  Laterality: N/A;    KNEE SURGERY      RADIOFREQUENCY ABLATION OF LUMBAR MEDIAL BRANCH NERVE AT SINGLE LEVEL Right 10/29/2019    Procedure: Radiofrequency Ablation, Nerve, Spinal, Lumbar, Medial Branch, 1 Level;  Surgeon: Yonny Ferrer MD;  Location: [facility] OR;  Service: Pain Management;  Laterality: Right;  L2, 3, 4, 5  burned at 80 degrees C X 60 seconds each site X 2    SHOULDER ARTHROSCOPY      SHOULDER SURGERY      UPPER GASTROINTESTINAL ENDOSCOPY  ~2013    Dr. Moralez       Past Medical History:   Diagnosis Date    Anemia     Anemia in chronic kidney disease, on chronic dialysis 7/12/2017    Anxiety and depression     Aplastic anemia with parvovirus B19 infection 5/27/2019    Arthritis     Asthma     allergic airway    CKD stage III (moderate) 2/18/2019    Colon polyp     Depression     Diabetes mellitus     Diverticulosis     Eosinophilia     ESRD (end stage renal disease)     stage V, due for living donor 9/2018    ESRD on dialysis     GERD (gastroesophageal reflux disease)     Gout     Gout     Hyperlipidemia     Hypertension     Hypertriglyceridemia without hypercholesterolemia 6/9/2019    Low back pain     Low HDL (under 40) 6/9/2019    MRSA carrier     Neutropenia, unspecified 5/8/2019    Obesity     Renal disorder     Rotator cuff syndrome--left      Thyroid disease     Ulnar neuropathy of right upper extremity     Uncontrolled type 2 diabetes mellitus with hyperglycemia        Social History     Socioeconomic History    Marital status: Significant Other     Spouse name: Any Alvarez    Number of children: 2    Years of education: Not on file    Highest education level: 12th grade   Occupational History    Occupation: retired     Comment:    Social Needs    Financial resource strain: Not hard at all    Food insecurity     Worry: Never true     Inability: Never true    Transportation needs     Medical: No     Non-medical: No   Tobacco Use    Smoking status: Never Smoker    Smokeless tobacco: Never Used   Substance and Sexual Activity    Alcohol use: No     Alcohol/week: 0.0 standard drinks     Frequency: Never     Drinks per session: 1 or 2     Binge frequency: Never    Drug use: No    Sexual activity: Not Currently   Lifestyle    Physical activity     Days per week: 0 days     Minutes per session: 0 min    Stress: Very much   Relationships    Social connections     Talks on phone: Three times a week     Gets together: Once a week     Attends Oriental orthodox service: 1 to 4 times per year     Active member of club or organization: No     Attends meetings of clubs or organizations: Never     Relationship status: Living with partner   Other Topics Concern    Not on file   Social History Narrative     female    Disabled since 2015 due to DDD and severe osteoarthritis of knee and hips    Previously worked as  at [employer]    Lives in residential 1 story home with partner, Any.     Has 2 boys, 1 lives in [place], 1 in [place].         Confucianism: Orthodox    Hobbies: painting and crafts.         Review of Systems   Constitutional: Positive for fatigue. Negative for activity change, appetite change, fever and unexpected weight change.   HENT: Negative for congestion, hearing loss, sore throat,  "trouble swallowing and voice change.    Eyes: Negative for visual disturbance.   Respiratory: Negative for apnea, cough, chest tightness, shortness of breath and wheezing.    Cardiovascular: Negative for chest pain, palpitations and leg swelling.   Gastrointestinal: Negative for abdominal pain, blood in stool, constipation, diarrhea, nausea and vomiting.   Genitourinary: Negative for difficulty urinating, dysuria, enuresis, flank pain, frequency, hematuria, pelvic pain and urgency.   Musculoskeletal: Positive for back pain. Negative for arthralgias, gait problem, joint swelling, myalgias, neck pain and neck stiffness.   Skin: Negative for rash and wound.   Neurological: Negative for dizziness, tremors, syncope, weakness, light-headedness, numbness and headaches.   Hematological: Does not bruise/bleed easily.   Psychiatric/Behavioral: Negative for dysphoric mood and sleep disturbance. The patient is not nervous/anxious.        Objective:   /80 (BP Location: Right arm, Patient Position: Sitting, BP Method: Large (Manual))   Pulse 74   Temp 97.6 °F (36.4 °C) (Temporal)   Ht 5' 7" (1.702 m)   Wt 101.1 kg (222 lb 14.2 oz)   LMP 02/28/2012   SpO2 97%   BMI 34.91 kg/m²     Physical Exam  Vitals signs and nursing note reviewed.   Constitutional:       General: She is not in acute distress.     Appearance: She is well-developed. She is not diaphoretic.   HENT:      Head: Normocephalic and atraumatic.   Cardiovascular:      Rate and Rhythm: Normal rate and regular rhythm.      Heart sounds: Murmur present. Systolic murmur present with a grade of 1/6. No friction rub. No gallop.    Pulmonary:      Effort: Pulmonary effort is normal. No respiratory distress.      Breath sounds: Normal breath sounds. No wheezing.   Chest:      Chest wall: No tenderness.   Abdominal:      General: Bowel sounds are normal. There is no distension.      Palpations: Abdomen is soft. There is no mass.      Tenderness: There is no " abdominal tenderness. There is no guarding or rebound.      Comments: Tenderness with palpation to RUQ   Musculoskeletal: Normal range of motion.         General: No tenderness or deformity.   Skin:     General: Skin is warm and dry.   Neurological:      Mental Status: She is alert and oriented to person, place, and time.   Psychiatric:         Behavior: Behavior normal.         Thought Content: Thought content normal.         Judgment: Judgment normal.         Lab Results   Component Value Date    WBC 9.15 09/17/2020    HGB 12.2 09/17/2020    HCT 39.9 09/17/2020     09/17/2020    CHOL 138 11/21/2019    TRIG 148 11/21/2019    HDL 36 (L) 11/21/2019    ALT 32 09/17/2020    AST 35 09/17/2020     09/17/2020    K 4.7 09/17/2020     09/17/2020    CREATININE 1.0 09/17/2020    BUN 13 09/17/2020    CO2 28 09/17/2020    TSH 0.777 05/12/2020    INR 1.1 05/07/2019    HGBA1C 7.0 (H) 05/12/2020    HGBA1C 7.0 (H) 05/12/2020    HGBA1C 7.0 (H) 05/12/2020       Medication List with Changes/Refills   New Medications    INSULIN DEGLUDEC (TRESIBA FLEXTOUCH U-200) 200 UNIT/ML (3 ML) INPN    Inject 50 Units into the skin once daily.   Current Medications    BLOOD SUGAR DIAGNOSTIC (BLOOD GLUCOSE TEST) STRP    Checks her bg 4 times a day   e11.9    CALCIUM-VITAMIN D3 (OS-DORETHA 500 + D3) 500 MG(1,250MG) -200 UNIT PER TABLET    Take 1 tablet by mouth once daily.    CEFPODOXIME (VANTIN) 200 MG TABLET    Take 1 tablet (200 mg total) by mouth 2 (two) times a day. FOR 7 DAYS    FAMOTIDINE (PEPCID) 20 MG TABLET    Take 1 tablet (20 mg total) by mouth 2 (two) times daily.    FLASH GLUCOSE SENSOR (FREESTYLE DAKOTAH 14 DAY SENSOR) KIT    Uses 2 kit monthly    HYDROCODONE-ACETAMINOPHEN (NORCO) 7.5-325 MG PER TABLET    Take 1 tablet by mouth every 6 (six) hours as needed for Pain.    LEVOTHYROXINE (SYNTHROID) 75 MCG TABLET    Take 1 tablet (75 mcg total) by mouth once daily.    LORAZEPAM (ATIVAN) 1 MG TABLET    Take 1 mg by mouth every  evening.    MAGNESIUM OXIDE 500 MG TAB    Take 500 mg by mouth once daily.    METFORMIN (GLUCOPHAGE-XR) 500 MG ER 24HR TABLET    TAKE ONE TABLET BY MOUTH TWICE A DAY WITH MEALS     MULTIVITAMIN (THERAGRAN) TABLET    Take 1 tablet by mouth once daily.    PANTOPRAZOLE (PROTONIX) 20 MG TABLET    Take 1 tablet (20 mg total) by mouth once daily.    ROSUVASTATIN (CRESTOR) 10 MG TABLET    Take 1 tablet (10 mg total) by mouth every evening.    TACROLIMUS (PROGRAF) 0.5 MG CAP    Take 2 capsules (1 mg total) by mouth every 12 (twelve) hours. Total of 1.5mg in am and 1 mg in pm z94.0    TIZANIDINE (ZANAFLEX) 2 MG TABLET    Take 15 mg by mouth every evening.    VILAZODONE (VIIBRYD) 40 MG TAB TABLET    Take 1 tablet (40 mg total) by mouth once daily.   Changed and/or Refilled Medications    Modified Medication Previous Medication    INSULIN REGULAR HUM U-500 CONC (HUMULIN R U-500, CONC, KWIKPEN) 500 UNIT/ML (3 ML) INPN insulin regular hum U-500 conc (HUMULIN R U-500, CONC, KWIKPEN) 500 unit/mL (3 mL) InPn       INJECT 30 UNITS with lunch and dinner    INJECT 120 UNITS UNDER WITH BREAKFAST AND 40 UNITS WITH DINNER.                 RESULTS: Reviewed labs and images today    Assessment:   60 y.o. female with multiple co-morbid illnesses here to chronic care management.     Plan:     Problem List Items Addressed This Visit        Neuro    Lumbar stenosis with neurogenic claudication     Followed by neuro and pain mgmt  - Recommend conservative management by neurosurgery given no neurologic deficit and surgical intervention may not help her axial back pain.   - failed epidural steroid injection   - cannabis too expensive, considering pain pump.   - pain goal 3/10, today 5/10.   - encourage to restart aqua therapy              Pulmonary    Mild intermittent asthma without complication     Well controlled, denies recent exacerbation   Lungs ctab  Stable, Will cont to monitor             Cardiac/Vascular    Mixed diabetic hyperlipidemia  associated with type 2 diabetes mellitus - Primary     Lipids controlled on statin  - pt no longer eating friend foods, cooking in air fryer  - encourage to restart aqua therapy  - cont meds as now         Relevant Medications    insulin degludec (TRESIBA FLEXTOUCH U-200) 200 unit/mL (3 mL) InPn    insulin regular hum U-500 conc (HUMULIN R U-500, CONC, KWIKPEN) 500 unit/mL (3 mL) InPn    Other Relevant Orders    Lipid Panel       Endocrine    Controlled type 2 diabetes mellitus with complication, with long-term current use of insulin     Followed by endocrinology  - A1c 7.0 (05/2020)  - Pt with significant hypoglycemia between 3am-6am and significant hyperglycemia between 1am-3am. - Currently on Humulin R [id number] units in am and 40 or 30 units in pm depending on bs >150<150.   - will try tresiba and wean off Humulin R U-500. Start tresiba 50 units in am with breakfast, take Humulin R U-500 30 units with lunch and dinner. Call for bs <80.              Relevant Medications    insulin degludec (TRESIBA FLEXTOUCH U-200) 200 unit/mL (3 mL) InPn    insulin regular hum U-500 conc (HUMULIN R U-500, CONC, KWIKPEN) 500 unit/mL (3 mL) InPn    Vitamin D deficiency     On ergo  Level 27 11/2019  - repeat level          Relevant Orders    Vitamin D      Other Visit Diagnoses     Uncontrolled type 2 diabetes mellitus with diabetic nephropathy, with long-term current use of insulin        Relevant Medications    insulin degludec (TRESIBA FLEXTOUCH U-200) 200 unit/mL (3 mL) InPn    insulin regular hum U-500 conc (HUMULIN R U-500, CONC, KWIKPEN) 500 unit/mL (3 mL) InPn    Other Relevant Orders    Hemoglobin A1C          Health Maintenance       Date Due Completion Date    Aspirin/Antiplatelet Therapy 02/15/1976 ---    Shingles Vaccine (1 of 2) 02/15/2008 ---    Influenza Vaccine (1) 08/01/2020 8/11/2019    Hemoglobin A1c 08/12/2020 5/12/2020    Eye Exam 09/17/2020 9/17/2019    Lipid Panel 11/21/2020 11/21/2019    Foot Exam  12/11/2020 12/11/2019    Override on 10/2/2019: Done    Mammogram 12/03/2021 12/3/2019    Pap Smear with HPV Cotest 09/04/2023 9/4/2018    Pneumococcal Vaccine (Highest Risk) (3 of 3 - PPSV23) 04/30/2024 4/30/2019    Colorectal Cancer Screening 05/09/2024 5/9/2019    TETANUS VACCINE 07/12/2027 7/12/2017          Follow up in about 2 weeks (around 10/6/2020) for diabetes . . Total face-to-face time was 50 min, greater than 50% of this was spent on counseling (pain mgmt, diabetes, insulin titration, exercise) and coordination of care.       Gay Tong, MARCYP-C  Family Medicine  [facility] [place]

## 2020-09-23 LAB
B19V DNA # SPEC NAA+PROBE: ABNORMAL COPIES/ML
SPECIMEN SOURCE: ABNORMAL

## 2020-09-29 ENCOUNTER — LAB VISIT (OUTPATIENT)
Dept: LAB | Facility: HOSPITAL | Age: 60
End: 2020-09-29
Attending: INTERNAL MEDICINE
Payer: MEDICARE

## 2020-09-29 DIAGNOSIS — E11.69 MIXED DIABETIC HYPERLIPIDEMIA ASSOCIATED WITH TYPE 2 DIABETES MELLITUS: ICD-10-CM

## 2020-09-29 DIAGNOSIS — Z94.0 KIDNEY REPLACED BY TRANSPLANT: ICD-10-CM

## 2020-09-29 DIAGNOSIS — Z20.828 PARVOVIRUS EXPOSURE: ICD-10-CM

## 2020-09-29 DIAGNOSIS — E78.2 MIXED DIABETIC HYPERLIPIDEMIA ASSOCIATED WITH TYPE 2 DIABETES MELLITUS: ICD-10-CM

## 2020-09-29 DIAGNOSIS — E55.9 VITAMIN D DEFICIENCY: ICD-10-CM

## 2020-09-29 LAB
25(OH)D3+25(OH)D2 SERPL-MCNC: 42 NG/ML (ref 30–96)
ALBUMIN SERPL BCP-MCNC: 4 G/DL (ref 3.5–5.2)
ALBUMIN SERPL BCP-MCNC: 4 G/DL (ref 3.5–5.2)
ALP SERPL-CCNC: 125 U/L (ref 55–135)
ALT SERPL W/O P-5'-P-CCNC: 32 U/L (ref 10–44)
ANION GAP SERPL CALC-SCNC: 9 MMOL/L (ref 8–16)
AST SERPL-CCNC: 32 U/L (ref 10–40)
BASOPHILS # BLD AUTO: 0.03 K/UL (ref 0–0.2)
BASOPHILS NFR BLD: 0.4 % (ref 0–1.9)
BILIRUB DIRECT SERPL-MCNC: 0.1 MG/DL (ref 0.1–0.3)
BILIRUB SERPL-MCNC: 0.3 MG/DL (ref 0.1–1)
BUN SERPL-MCNC: 16 MG/DL (ref 8–23)
CALCIUM SERPL-MCNC: 9.6 MG/DL (ref 8.7–10.5)
CHLORIDE SERPL-SCNC: 102 MMOL/L (ref 95–110)
CHOLEST SERPL-MCNC: 106 MG/DL (ref 120–199)
CHOLEST/HDLC SERPL: 3.9 {RATIO} (ref 2–5)
CO2 SERPL-SCNC: 30 MMOL/L (ref 23–29)
CREAT SERPL-MCNC: 1 MG/DL (ref 0.5–1.4)
DIFFERENTIAL METHOD: ABNORMAL
EOSINOPHIL # BLD AUTO: 0.9 K/UL (ref 0–0.5)
EOSINOPHIL NFR BLD: 11.9 % (ref 0–8)
ERYTHROCYTE [DISTWIDTH] IN BLOOD BY AUTOMATED COUNT: 13.9 % (ref 11.5–14.5)
EST. GFR  (AFRICAN AMERICAN): >60 ML/MIN/1.73 M^2
EST. GFR  (NON AFRICAN AMERICAN): >60 ML/MIN/1.73 M^2
ESTIMATED AVG GLUCOSE: 157 MG/DL (ref 68–131)
GLUCOSE SERPL-MCNC: 172 MG/DL (ref 70–110)
HBA1C MFR BLD HPLC: 7.1 % (ref 4–5.6)
HCT VFR BLD AUTO: 38.6 % (ref 37–48.5)
HDLC SERPL-MCNC: 27 MG/DL (ref 40–75)
HDLC SERPL: 25.5 % (ref 20–50)
HGB BLD-MCNC: 11.8 G/DL (ref 12–16)
IMM GRANULOCYTES # BLD AUTO: 0.02 K/UL (ref 0–0.04)
IMM GRANULOCYTES NFR BLD AUTO: 0.3 % (ref 0–0.5)
LDLC SERPL CALC-MCNC: 31.2 MG/DL (ref 63–159)
LYMPHOCYTES # BLD AUTO: 1.3 K/UL (ref 1–4.8)
LYMPHOCYTES NFR BLD: 17.7 % (ref 18–48)
MAGNESIUM SERPL-MCNC: 1.6 MG/DL (ref 1.6–2.6)
MCH RBC QN AUTO: 28.8 PG (ref 27–31)
MCHC RBC AUTO-ENTMCNC: 30.6 G/DL (ref 32–36)
MCV RBC AUTO: 94 FL (ref 82–98)
MONOCYTES # BLD AUTO: 0.6 K/UL (ref 0.3–1)
MONOCYTES NFR BLD: 7.4 % (ref 4–15)
NEUTROPHILS # BLD AUTO: 4.6 K/UL (ref 1.8–7.7)
NEUTROPHILS NFR BLD: 62.3 % (ref 38–73)
NONHDLC SERPL-MCNC: 79 MG/DL
NRBC BLD-RTO: 0 /100 WBC
PHOSPHATE SERPL-MCNC: 4.3 MG/DL (ref 2.7–4.5)
PLATELET # BLD AUTO: 204 K/UL (ref 150–350)
PMV BLD AUTO: 10.1 FL (ref 9.2–12.9)
POTASSIUM SERPL-SCNC: 4.6 MMOL/L (ref 3.5–5.1)
PROT SERPL-MCNC: 7.3 G/DL (ref 6–8.4)
RBC # BLD AUTO: 4.1 M/UL (ref 4–5.4)
SODIUM SERPL-SCNC: 141 MMOL/L (ref 136–145)
TRIGL SERPL-MCNC: 239 MG/DL (ref 30–150)
WBC # BLD AUTO: 7.4 K/UL (ref 3.9–12.7)

## 2020-09-29 PROCEDURE — 36415 COLL VENOUS BLD VENIPUNCTURE: CPT | Mod: PO

## 2020-09-29 PROCEDURE — 80061 LIPID PANEL: CPT

## 2020-09-29 PROCEDURE — 83036 HEMOGLOBIN GLYCOSYLATED A1C: CPT

## 2020-09-29 PROCEDURE — 84075 ASSAY ALKALINE PHOSPHATASE: CPT

## 2020-09-29 PROCEDURE — 82306 VITAMIN D 25 HYDROXY: CPT

## 2020-09-29 PROCEDURE — 85025 COMPLETE CBC W/AUTO DIFF WBC: CPT

## 2020-09-29 PROCEDURE — 87799 DETECT AGENT NOS DNA QUANT: CPT

## 2020-09-29 PROCEDURE — 80069 RENAL FUNCTION PANEL: CPT

## 2020-09-29 PROCEDURE — 80197 ASSAY OF TACROLIMUS: CPT

## 2020-09-29 PROCEDURE — 83735 ASSAY OF MAGNESIUM: CPT

## 2020-09-29 RX ORDER — TACROLIMUS 1 MG/1
1 CAPSULE ORAL EVERY 12 HOURS
Qty: 60 CAPSULE | Refills: 11 | Status: SHIPPED | OUTPATIENT
Start: 2020-09-29 | End: 2021-01-20 | Stop reason: SDUPTHER

## 2020-09-30 LAB — TACROLIMUS BLD-MCNC: 3.9 NG/ML (ref 5–15)

## 2020-09-30 NOTE — TELEPHONE ENCOUNTER
Patient repeated back and voice a understanding of orders .  12 hour through reviewed , patient to repeat Tacrolimus level tomorrow 10/1/2020.    ----- Message from Maureen Cabral MD sent at 9/30/2020  3:54 PM CDT -----  The following message sent to patient via [facility]:  Target level is 4-6.  Although this is slightly low, I wish to repeat prior to making any dose change.

## 2020-10-01 ENCOUNTER — LAB VISIT (OUTPATIENT)
Dept: LAB | Facility: HOSPITAL | Age: 60
End: 2020-10-01
Attending: INTERNAL MEDICINE
Payer: MEDICARE

## 2020-10-01 DIAGNOSIS — Z94.0 KIDNEY REPLACED BY TRANSPLANT: ICD-10-CM

## 2020-10-01 LAB — TACROLIMUS BLD-MCNC: 4.7 NG/ML (ref 5–15)

## 2020-10-01 PROCEDURE — 80197 ASSAY OF TACROLIMUS: CPT

## 2020-10-01 PROCEDURE — 36415 COLL VENOUS BLD VENIPUNCTURE: CPT | Mod: PO

## 2020-10-05 ENCOUNTER — PATIENT MESSAGE (OUTPATIENT)
Dept: ADMINISTRATIVE | Facility: HOSPITAL | Age: 60
End: 2020-10-05

## 2020-10-05 LAB
B19V DNA # SPEC NAA+PROBE: ABNORMAL COPIES/ML
SPECIMEN SOURCE: ABNORMAL

## 2020-10-06 ENCOUNTER — OFFICE VISIT (OUTPATIENT)
Dept: PRIMARY CARE CLINIC | Facility: CLINIC | Age: 60
End: 2020-10-06
Payer: MEDICARE

## 2020-10-06 ENCOUNTER — PATIENT MESSAGE (OUTPATIENT)
Dept: PRIMARY CARE CLINIC | Facility: CLINIC | Age: 60
End: 2020-10-06

## 2020-10-06 VITALS
OXYGEN SATURATION: 98 % | TEMPERATURE: 98 F | HEIGHT: 67 IN | BODY MASS INDEX: 35.12 KG/M2 | HEART RATE: 73 BPM | DIASTOLIC BLOOD PRESSURE: 80 MMHG | SYSTOLIC BLOOD PRESSURE: 150 MMHG | WEIGHT: 223.75 LBS

## 2020-10-06 DIAGNOSIS — N18.30 HYPERTENSION ASSOCIATED WITH STAGE 3 CHRONIC KIDNEY DISEASE DUE TO TYPE 2 DIABETES MELLITUS: Primary | ICD-10-CM

## 2020-10-06 DIAGNOSIS — E11.22 HYPERTENSION ASSOCIATED WITH STAGE 3 CHRONIC KIDNEY DISEASE DUE TO TYPE 2 DIABETES MELLITUS: Primary | ICD-10-CM

## 2020-10-06 DIAGNOSIS — Z79.4 CONTROLLED TYPE 2 DIABETES MELLITUS WITH COMPLICATION, WITH LONG-TERM CURRENT USE OF INSULIN: ICD-10-CM

## 2020-10-06 DIAGNOSIS — E11.8 CONTROLLED TYPE 2 DIABETES MELLITUS WITH COMPLICATION, WITH LONG-TERM CURRENT USE OF INSULIN: ICD-10-CM

## 2020-10-06 DIAGNOSIS — G89.4 CHRONIC PAIN SYNDROME: ICD-10-CM

## 2020-10-06 DIAGNOSIS — I12.9 HYPERTENSION ASSOCIATED WITH STAGE 3 CHRONIC KIDNEY DISEASE DUE TO TYPE 2 DIABETES MELLITUS: Primary | ICD-10-CM

## 2020-10-06 DIAGNOSIS — B34.3 PARVOVIRUS B19 INFECTION: ICD-10-CM

## 2020-10-06 PROCEDURE — 3077F SYST BP >= 140 MM HG: CPT | Mod: CPTII,S$GLB,, | Performed by: NURSE PRACTITIONER

## 2020-10-06 PROCEDURE — 99499 UNLISTED E&M SERVICE: CPT | Mod: S$GLB,,, | Performed by: NURSE PRACTITIONER

## 2020-10-06 PROCEDURE — 3079F PR MOST RECENT DIASTOLIC BLOOD PRESSURE 80-89 MM HG: ICD-10-PCS | Mod: CPTII,S$GLB,, | Performed by: NURSE PRACTITIONER

## 2020-10-06 PROCEDURE — 3077F PR MOST RECENT SYSTOLIC BLOOD PRESSURE >= 140 MM HG: ICD-10-PCS | Mod: CPTII,S$GLB,, | Performed by: NURSE PRACTITIONER

## 2020-10-06 PROCEDURE — 99213 PR OFFICE/OUTPT VISIT, EST, LEVL III, 20-29 MIN: ICD-10-PCS | Mod: S$GLB,,, | Performed by: NURSE PRACTITIONER

## 2020-10-06 PROCEDURE — 3051F PR MOST RECENT HEMOGLOBIN A1C LEVEL 7.0 - < 8.0%: ICD-10-PCS | Mod: CPTII,S$GLB,, | Performed by: NURSE PRACTITIONER

## 2020-10-06 PROCEDURE — 3008F PR BODY MASS INDEX (BMI) DOCUMENTED: ICD-10-PCS | Mod: CPTII,S$GLB,, | Performed by: NURSE PRACTITIONER

## 2020-10-06 PROCEDURE — 99213 OFFICE O/P EST LOW 20 MIN: CPT | Mod: S$GLB,,, | Performed by: NURSE PRACTITIONER

## 2020-10-06 PROCEDURE — 3079F DIAST BP 80-89 MM HG: CPT | Mod: CPTII,S$GLB,, | Performed by: NURSE PRACTITIONER

## 2020-10-06 PROCEDURE — 3051F HG A1C>EQUAL 7.0%<8.0%: CPT | Mod: CPTII,S$GLB,, | Performed by: NURSE PRACTITIONER

## 2020-10-06 PROCEDURE — 99499 RISK ADDL DX/OHS AUDIT: ICD-10-PCS | Mod: S$GLB,,, | Performed by: NURSE PRACTITIONER

## 2020-10-06 PROCEDURE — 3008F BODY MASS INDEX DOCD: CPT | Mod: CPTII,S$GLB,, | Performed by: NURSE PRACTITIONER

## 2020-10-06 RX ORDER — INSULIN HUMAN 500 [IU]/ML
INJECTION, SOLUTION SUBCUTANEOUS
Qty: 6 SYRINGE | Refills: 11
Start: 2020-10-06 | End: 2020-10-20 | Stop reason: ALTCHOICE

## 2020-10-06 NOTE — ASSESSMENT & PLAN NOTE
Better control since addition of Tresiba.    Discussed with pt. taking first dose of regular insulin at 10 am when she administers Tresiba and continuing PM insulin at 5:30 pm.     She will do this and notify me after one week if no significant improvement in afternoon glucose readings.     Otherwise, will follow up in two weeks for DMII mgmt.

## 2020-10-06 NOTE — PROGRESS NOTES
Primary Care Provider Appointment    Subjective:      Patient ID: Andra Newell is a 60 y.o. female  with history of HTN, HLD, T2DM c/b ESRD s/p living L kidney transplant 1/14/2019, On immunosupressant, DJD, Asthma, hypothyroidism, HPT     Chief Complaint: Follow-up    Pt seen today for f/u of uncontrolled DMII. Two weeks ago insulin changed toTresiba 50 units daily plus 40 units regular insulin BID due to having episodes of significant early morning hypoglycemia preceded by intermittent hyperglycemia. No further episodes of hypoglycemia since dose change.     She is taking 40 units regular insulin at 1:30 pm and another 40 units regular insulin at approx 5:30 pm.     Graph reviewed.                     Followed by:  Infectious Disease: Dr. Cammie Kessler. Last seen 9/9/2020. Followed for prior history of Parvo B19 virus infection with subsequent anemia, diarrhea, resolved with course of IVIg.  Parvo B19 VL checked due to anemia, found to be elevated. Patient otherwise feeling well, notes fatigue and SOB, but CBC, kidney, and liver function all normal. Continue to monitor [id number] levels, CBC with diff, CMP q2 weeks. If patient become symptomatic with fevers, hepatitis, pneumonia, myocarditis, anemia, pancytopenia, would consider treatment with IVIG.  Transplant: Dr. Maureen Cabral. Last seen 7/24/2020. Cont CellCept with close monitoring of her white count; low dose of 250 mg, 1 cap twice daily. Continue monitoring for drug toxicity and med-related side effects as well as balance risk of infections vs. Rejection in this very complex case.  Pain Mgmt: Dr. Ruben Nunez. Last seen 05/2020. Next visit 07/20, considering cannabis vs pain pump.  Optometrist: Dr. Arias. Recently had annual exam, records requested.   Neurology: Dr. Ashish Walden. Last seen 7/29/19. F/u post renal transplant for neurogenic claudication secondary to lumbar stenosis. Has a left side achilles tendon rupture w/o repair. Her gait  instability is a combination of both problems. Advised to see back and spine surgery for planning for surgery. Referred to EMG/NCS  Prognostically. f/u with neurology in 3 months.  Urology: Dr. Jacqui Artis. Last seen 4/10/19. Followed for recurrent UTI and urgency. Urethroscopy:  Normal.  Cystoscopy:  Normal bladder mucosa, right dome implanted ureter noted with + efflux. RTC prn   Nephrology: Dr. Heaton: Last visit 05/2019, RTC 01/2020. Will request records.   Psychiatry: Dr. Sheth: Last seen 10/2019. No changes per pt. RTC in 3 mths.   Therapist: Mague Muniz. Sees weekly for CBT.     Past Surgical History:   Procedure Laterality Date    ACHILLES TENDON SURGERY Left May 2015    BACK SURGERY      CHOLECYSTECTOMY      COLONOSCOPY N/A 9/6/2017    Procedure: COLONOSCOPY;  Surgeon: Marisol Bryson MD;  Location: [facility];  Service: Endoscopy;  Laterality: N/A; REPEAT IN 3 YEARS FOR SURVEILLANCE    COLONOSCOPY N/A 5/9/2019    Procedure: COLONOSCOPY;  Surgeon: Fady Ewing MD;  Location: [facility] ([address] [place] [address]);  Service: Endoscopy;  Laterality: N/A;    DIALYSIS FISTULA CREATION  12/2017    ESOPHAGOGASTRODUODENOSCOPY N/A 5/9/2019    Procedure: EGD (ESOPHAGOGASTRODUODENOSCOPY);  Surgeon: Fady Ewing MD;  Location: [facility] ([address] [place] [address]);  Service: Endoscopy;  Laterality: N/A;    HEMORRHOID SURGERY      INJECTION OF ANESTHETIC AGENT AROUND MEDIAL BRANCH NERVES INNERVATING LUMBAR FACET JOINT Right 9/25/2019    Procedure: Block-nerve-medial branch-lumbar;  Surgeon: Yonny Ferrer MD;  Location: [facility] OR;  Service: Pain Management;  Laterality: Right;  L2, 3, 4,5     INJECTION OF ANESTHETIC AGENT AROUND MEDIAL BRANCH NERVES INNERVATING LUMBAR FACET JOINT Right 10/16/2019    Procedure: Block-nerve-medial branch-lumbar;  Surgeon: Yonny Ferrer MD;  Location: [facility] OR;  Service: Pain Management;  Laterality: Right;  L2, 3, 4,5     JOINT REPLACEMENT      left    KIDNEY TRANSPLANT N/A 1/14/2019    Procedure:  TRANSPLANT, KIDNEY;  Surgeon: Roland Salazar MD;  [facility];  Service: Transplant;  Laterality: N/A;    KNEE SURGERY      RADIOFREQUENCY ABLATION OF LUMBAR MEDIAL BRANCH NERVE AT SINGLE LEVEL Right 10/29/2019    Procedure: Radiofrequency Ablation, Nerve, Spinal, Lumbar, Medial Branch, 1 Level;  Surgeon: Yonny Ferrer MD;  Location: [facility] OR;  Service: Pain Management;  Laterality: Right;  L2, 3, 4, 5  burned at 80 degrees C X 60 seconds each site X 2    SHOULDER ARTHROSCOPY      SHOULDER SURGERY      UPPER GASTROINTESTINAL ENDOSCOPY  ~2013    Dr. Moralez       Past Medical History:   Diagnosis Date    Anemia     Anemia in chronic kidney disease, on chronic dialysis 7/12/2017    Anxiety and depression     Aplastic anemia with parvovirus B19 infection 5/27/2019    Arthritis     Asthma     allergic airway    CKD stage III (moderate) 2/18/2019    Colon polyp     Depression     Diabetes mellitus     Diverticulosis     Eosinophilia     ESRD (end stage renal disease)     stage V, due for living donor 9/2018    ESRD on dialysis     GERD (gastroesophageal reflux disease)     Gout     Gout     Hyperlipidemia     Hypertension     Hypertriglyceridemia without hypercholesterolemia 6/9/2019    Low back pain     Low HDL (under 40) 6/9/2019    MRSA carrier     Neutropenia, unspecified 5/8/2019    Obesity     Renal disorder     Rotator cuff syndrome--left     Thyroid disease     Ulnar neuropathy of right upper extremity     Uncontrolled type 2 diabetes mellitus with hyperglycemia        Social History     Socioeconomic History    Marital status: Significant Other     Spouse name: Any Alvarez    Number of children: 2    Years of education: Not on file    Highest education level: 12th grade   Occupational History    Occupation: retired     Comment:    Social Needs    Financial resource strain: Not hard at all    Food insecurity     Worry: Never true      "Inability: Never true    Transportation needs     Medical: No     Non-medical: No   Tobacco Use    Smoking status: Never Smoker    Smokeless tobacco: Never Used   Substance and Sexual Activity    Alcohol use: No     Alcohol/week: 0.0 standard drinks     Frequency: Never     Drinks per session: 1 or 2     Binge frequency: Never    Drug use: No    Sexual activity: Not Currently   Lifestyle    Physical activity     Days per week: 0 days     Minutes per session: 0 min    Stress: Very much   Relationships    Social connections     Talks on phone: Three times a week     Gets together: Once a week     Attends L[employer] day service: 1 to 4 times per year     Active member of club or organization: No     Attends meetings of clubs or organizations: Never     Relationship status: Living with partner   Other Topics Concern    Not on file   Social History Narrative     female    Disabled since 2015 due to DDD and severe osteoarthritis of knee and hips    Previously worked as  at [employer]    Lives in residential 1 story home with partner, Any.     Has 2 boys, 1 lives in [place], 1 in [place].         Confucianist: Religion    Hobbies: painting and crafts.         Review of Systems   Constitutional: Positive for fatigue (ongoing, unchanged from previous). Negative for activity change, appetite change and fever.   Respiratory: Negative for cough and shortness of breath.    Genitourinary: Negative for dysuria.   Musculoskeletal: Positive for back pain (chronic, followed by pain mgmt.).   Allergic/Immunologic: Positive for immunocompromised state (on Cellcept s/p renal transplant.).   Neurological: Negative for dizziness.   Psychiatric/Behavioral: Negative for sleep disturbance.       Objective:   BP (!) 150/80 (BP Location: Right arm, Patient Position: Sitting, BP Method: Large (Manual))   Pulse 73   Temp 97.5 °F (36.4 °C) (Temporal)   Ht 5' 7" (1.702 m)   Wt 101.5 kg (223 lb 12.3 oz)   " LMP 02/28/2012   SpO2 98%   BMI 35.05 kg/m²     Physical Exam  Vitals signs and nursing note reviewed.   Constitutional:       General: She is not in acute distress.     Appearance: Normal appearance. She is well-developed. She is obese. She is not ill-appearing or diaphoretic.   HENT:      Head: Normocephalic and atraumatic.   Cardiovascular:      Rate and Rhythm: Normal rate and regular rhythm.   Pulmonary:      Effort: Pulmonary effort is normal. No respiratory distress.   Abdominal:      Comments: Tenderness with palpation to RUQ   Musculoskeletal: Normal range of motion.         General: No tenderness or deformity.   Skin:     General: Skin is warm and dry.      Coloration: Skin is not pale.      Findings: No bruising or erythema.   Neurological:      Mental Status: She is alert and oriented to person, place, and time. Mental status is at baseline.      Coordination: Coordination normal.      Gait: Gait normal.   Psychiatric:         Behavior: Behavior normal.         Thought Content: Thought content normal.         Judgment: Judgment normal.         Lab Results   Component Value Date    WBC 7.40 09/29/2020    HGB 11.8 (L) 09/29/2020    HCT 38.6 09/29/2020     09/29/2020    CHOL 106 (L) 09/29/2020    TRIG 239 (H) 09/29/2020    HDL 27 (L) 09/29/2020    ALT 32 09/29/2020    AST 32 09/29/2020     09/29/2020    K 4.6 09/29/2020     09/29/2020    CREATININE 1.0 09/29/2020    BUN 16 09/29/2020    CO2 30 (H) 09/29/2020    TSH 0.777 05/12/2020    INR 1.1 05/07/2019    HGBA1C 7.1 (H) 09/29/2020       Medication List with Changes/Refills   Current Medications    BLOOD SUGAR DIAGNOSTIC (BLOOD GLUCOSE TEST) STRP    Checks her bg 4 times a day   e11.9    CALCIUM-VITAMIN D3 (OS-DORETHA 500 + D3) 500 MG(1,250MG) -200 UNIT PER TABLET    Take 1 tablet by mouth once daily.    CEFPODOXIME (VANTIN) 200 MG TABLET    Take 1 tablet (200 mg total) by mouth 2 (two) times a day. FOR 7 DAYS    FAMOTIDINE (PEPCID) 20 MG  TABLET    Take 1 tablet (20 mg total) by mouth 2 (two) times daily.    HYDROCODONE-ACETAMINOPHEN (NORCO) 7.5-325 MG PER TABLET    Take 1 tablet by mouth every 6 (six) hours as needed for Pain.    INSULIN DEGLUDEC (TRESIBA FLEXTOUCH U-200) 200 UNIT/ML (3 ML) INPN    Inject 50 Units into the skin once daily.    INSULIN REGULAR HUM U-500 CONC (HUMULIN R U-500, CONC, KWIKPEN) 500 UNIT/ML (3 ML) INPN    INJECT 30 UNITS with lunch and dinner    LEVOTHYROXINE (SYNTHROID) 75 MCG TABLET    Take 1 tablet (75 mcg total) by mouth once daily.    LORAZEPAM (ATIVAN) 1 MG TABLET    Take 1 mg by mouth every evening.    MAGNESIUM OXIDE 500 MG TAB    Take 500 mg by mouth once daily.    METFORMIN (GLUCOPHAGE-XR) 500 MG ER 24HR TABLET    TAKE ONE TABLET BY MOUTH TWICE A DAY WITH MEALS     MULTIVITAMIN (THERAGRAN) TABLET    Take 1 tablet by mouth once daily.    PANTOPRAZOLE (PROTONIX) 20 MG TABLET    Take 1 tablet (20 mg total) by mouth once daily.    ROSUVASTATIN (CRESTOR) 10 MG TABLET    Take 1 tablet (10 mg total) by mouth every evening.    TACROLIMUS (PROGRAF) 1 MG CAP    Take 1 capsule (1 mg total) by mouth every 12 (twelve) hours. Z94.0    TIZANIDINE (ZANAFLEX) 2 MG TABLET    Take 15 mg by mouth every evening.    VILAZODONE (VIIBRYD) 40 MG TAB TABLET    Take 1 tablet (40 mg total) by mouth once daily.   Discontinued Medications    FLASH GLUCOSE SENSOR (FREESTYLE DAKOTAH 14 DAY SENSOR) KIT    Uses 2 kit monthly         RESULTS: Reviewed labs and images today    Assessment:   60 y.o. female with multiple co-morbid illnesses here to chronic care management.     Plan:     Problem List Items Addressed This Visit        Neuro    Chronic pain syndrome     Followed by pain mgmt.  Planning to have pain pump.            Cardiac/Vascular    Hypertension associated with stage 3 chronic kidney disease due to type 2 diabetes mellitus - Primary     BP Sl elevated today, previously well-controlled.  Will repeat in two weeks.   GFR > 60 last  month.  Has been diet controlled.               ID    Parvovirus B19 infection     Parvo numbers down. No sx.   Followed by ID.   [id number] levels, CBC with diff, CMP q2 weeks recommended, due 10/13.                Endocrine    Controlled type 2 diabetes mellitus with complication, with long-term current use of insulin     Better control since addition of Tresiba.    Discussed with pt. taking first dose of regular insulin at 10 am when she administers Tresiba and continuing PM insulin at 5:30 pm.     She will do this and notify me after one week if no significant improvement in afternoon glucose readings.     Otherwise, will follow up in two weeks for DMII mgmt.            Other Visit Diagnoses     Uncontrolled type 2 diabetes mellitus with diabetic nephropathy, with long-term current use of insulin              Health Maintenance       Date Due Completion Date    Aspirin/Antiplatelet Therapy 02/15/1976 ---    Shingles Vaccine (1 of 2) 02/15/2008 ---    Influenza Vaccine (1) 08/01/2020 8/11/2019    Eye Exam 09/17/2020 9/17/2019    Foot Exam 12/11/2020 12/11/2019    Override on 10/2/2019: Done    Hemoglobin A1c 12/29/2020 9/29/2020    Lipid Panel 09/29/2021 9/29/2020    Mammogram 12/03/2021 12/3/2019    Pap Smear with HPV Cotest 09/04/2023 9/4/2018    Pneumococcal Vaccine (Highest Risk) (3 of 3 - PPSV23) 04/30/2024 4/30/2019    Colorectal Cancer Screening 05/09/2024 5/9/2019    TETANUS VACCINE 07/12/2027 7/12/2017          Follow up in about 2 weeks (around 10/20/2020). . Total face-to-face time was 20 min, greater than 50% of this was spent on counseling and coordination of care.       Gay Tong, GHULAM-C  Family Medicine  [facility] [place]

## 2020-10-06 NOTE — ASSESSMENT & PLAN NOTE
BP Sl elevated today, previously well-controlled.  Will repeat in two weeks.   GFR > 60 last month.  Has been diet controlled.

## 2020-10-06 NOTE — PATIENT INSTRUCTIONS
- Message pcp in 1 week with blood sugar results     - Change timing of the insulin U-500 40 units to 10am.

## 2020-10-06 NOTE — ASSESSMENT & PLAN NOTE
Parvo numbers down. No sx.   Followed by ID.   [id number] levels, CBC with diff, CMP q2 weeks recommended, due 10/13.

## 2020-10-09 DIAGNOSIS — E11.9 TYPE 2 DIABETES MELLITUS WITHOUT COMPLICATION, UNSPECIFIED WHETHER LONG TERM INSULIN USE: ICD-10-CM

## 2020-10-13 ENCOUNTER — LAB VISIT (OUTPATIENT)
Dept: LAB | Facility: HOSPITAL | Age: 60
End: 2020-10-13
Attending: INTERNAL MEDICINE
Payer: MEDICARE

## 2020-10-13 ENCOUNTER — PATIENT MESSAGE (OUTPATIENT)
Dept: PRIMARY CARE CLINIC | Facility: CLINIC | Age: 60
End: 2020-10-13

## 2020-10-13 DIAGNOSIS — Z94.0 KIDNEY REPLACED BY TRANSPLANT: ICD-10-CM

## 2020-10-13 DIAGNOSIS — B34.3 PARVOVIRUS B19 INFECTION: ICD-10-CM

## 2020-10-13 LAB
ALBUMIN SERPL BCP-MCNC: 3.8 G/DL (ref 3.5–5.2)
ANION GAP SERPL CALC-SCNC: 7 MMOL/L (ref 8–16)
BASOPHILS # BLD AUTO: 0.05 K/UL (ref 0–0.2)
BASOPHILS NFR BLD: 0.8 % (ref 0–1.9)
BUN SERPL-MCNC: 20 MG/DL (ref 8–23)
CALCIUM SERPL-MCNC: 9.9 MG/DL (ref 8.7–10.5)
CHLORIDE SERPL-SCNC: 104 MMOL/L (ref 95–110)
CO2 SERPL-SCNC: 32 MMOL/L (ref 23–29)
CREAT SERPL-MCNC: 1 MG/DL (ref 0.5–1.4)
DIFFERENTIAL METHOD: ABNORMAL
EOSINOPHIL # BLD AUTO: 0.8 K/UL (ref 0–0.5)
EOSINOPHIL NFR BLD: 13.2 % (ref 0–8)
ERYTHROCYTE [DISTWIDTH] IN BLOOD BY AUTOMATED COUNT: 13.6 % (ref 11.5–14.5)
EST. GFR  (AFRICAN AMERICAN): >60 ML/MIN/1.73 M^2
EST. GFR  (NON AFRICAN AMERICAN): >60 ML/MIN/1.73 M^2
GLUCOSE SERPL-MCNC: 165 MG/DL (ref 70–110)
HCT VFR BLD AUTO: 39.4 % (ref 37–48.5)
HGB BLD-MCNC: 12.2 G/DL (ref 12–16)
IMM GRANULOCYTES # BLD AUTO: 0.03 K/UL (ref 0–0.04)
IMM GRANULOCYTES NFR BLD AUTO: 0.5 % (ref 0–0.5)
LYMPHOCYTES # BLD AUTO: 1.5 K/UL (ref 1–4.8)
LYMPHOCYTES NFR BLD: 23.2 % (ref 18–48)
MAGNESIUM SERPL-MCNC: 1.5 MG/DL (ref 1.6–2.6)
MCH RBC QN AUTO: 29.1 PG (ref 27–31)
MCHC RBC AUTO-ENTMCNC: 31 G/DL (ref 32–36)
MCV RBC AUTO: 94 FL (ref 82–98)
MONOCYTES # BLD AUTO: 0.5 K/UL (ref 0.3–1)
MONOCYTES NFR BLD: 7.8 % (ref 4–15)
NEUTROPHILS # BLD AUTO: 3.5 K/UL (ref 1.8–7.7)
NEUTROPHILS NFR BLD: 54.5 % (ref 38–73)
NRBC BLD-RTO: 0 /100 WBC
PHOSPHATE SERPL-MCNC: 4.4 MG/DL (ref 2.7–4.5)
PLATELET # BLD AUTO: 192 K/UL (ref 150–350)
PMV BLD AUTO: 10 FL (ref 9.2–12.9)
POTASSIUM SERPL-SCNC: 4.8 MMOL/L (ref 3.5–5.1)
RBC # BLD AUTO: 4.19 M/UL (ref 4–5.4)
SODIUM SERPL-SCNC: 143 MMOL/L (ref 136–145)
TACROLIMUS BLD-MCNC: 6.4 NG/ML (ref 5–15)
WBC # BLD AUTO: 6.38 K/UL (ref 3.9–12.7)

## 2020-10-13 PROCEDURE — 85025 COMPLETE CBC W/AUTO DIFF WBC: CPT

## 2020-10-13 PROCEDURE — 83735 ASSAY OF MAGNESIUM: CPT

## 2020-10-13 PROCEDURE — 80069 RENAL FUNCTION PANEL: CPT

## 2020-10-13 PROCEDURE — 80197 ASSAY OF TACROLIMUS: CPT

## 2020-10-13 PROCEDURE — 36415 COLL VENOUS BLD VENIPUNCTURE: CPT | Mod: PO

## 2020-10-13 PROCEDURE — 87799 DETECT AGENT NOS DNA QUANT: CPT

## 2020-10-18 LAB
B19V DNA # SPEC NAA+PROBE: ABNORMAL COPIES/ML
SPECIMEN SOURCE: ABNORMAL

## 2020-10-20 ENCOUNTER — OFFICE VISIT (OUTPATIENT)
Dept: PRIMARY CARE CLINIC | Facility: CLINIC | Age: 60
End: 2020-10-20
Payer: MEDICARE

## 2020-10-20 ENCOUNTER — PATIENT MESSAGE (OUTPATIENT)
Dept: PRIMARY CARE CLINIC | Facility: CLINIC | Age: 60
End: 2020-10-20

## 2020-10-20 VITALS
TEMPERATURE: 98 F | HEART RATE: 78 BPM | OXYGEN SATURATION: 97 % | WEIGHT: 223.13 LBS | SYSTOLIC BLOOD PRESSURE: 160 MMHG | DIASTOLIC BLOOD PRESSURE: 80 MMHG | BODY MASS INDEX: 35.02 KG/M2 | HEIGHT: 67 IN

## 2020-10-20 DIAGNOSIS — E11.8 CONTROLLED TYPE 2 DIABETES MELLITUS WITH COMPLICATION, WITH LONG-TERM CURRENT USE OF INSULIN: Primary | ICD-10-CM

## 2020-10-20 DIAGNOSIS — Z79.4 CONTROLLED TYPE 2 DIABETES MELLITUS WITH COMPLICATION, WITH LONG-TERM CURRENT USE OF INSULIN: Primary | ICD-10-CM

## 2020-10-20 PROCEDURE — 3077F PR MOST RECENT SYSTOLIC BLOOD PRESSURE >= 140 MM HG: ICD-10-PCS | Mod: CPTII,S$GLB,, | Performed by: NURSE PRACTITIONER

## 2020-10-20 PROCEDURE — 3079F PR MOST RECENT DIASTOLIC BLOOD PRESSURE 80-89 MM HG: ICD-10-PCS | Mod: CPTII,S$GLB,, | Performed by: NURSE PRACTITIONER

## 2020-10-20 PROCEDURE — 99213 PR OFFICE/OUTPT VISIT, EST, LEVL III, 20-29 MIN: ICD-10-PCS | Mod: S$GLB,,, | Performed by: NURSE PRACTITIONER

## 2020-10-20 PROCEDURE — 3008F PR BODY MASS INDEX (BMI) DOCUMENTED: ICD-10-PCS | Mod: CPTII,S$GLB,, | Performed by: NURSE PRACTITIONER

## 2020-10-20 PROCEDURE — 3079F DIAST BP 80-89 MM HG: CPT | Mod: CPTII,S$GLB,, | Performed by: NURSE PRACTITIONER

## 2020-10-20 PROCEDURE — 3051F PR MOST RECENT HEMOGLOBIN A1C LEVEL 7.0 - < 8.0%: ICD-10-PCS | Mod: CPTII,S$GLB,, | Performed by: NURSE PRACTITIONER

## 2020-10-20 PROCEDURE — 3008F BODY MASS INDEX DOCD: CPT | Mod: CPTII,S$GLB,, | Performed by: NURSE PRACTITIONER

## 2020-10-20 PROCEDURE — 99213 OFFICE O/P EST LOW 20 MIN: CPT | Mod: S$GLB,,, | Performed by: NURSE PRACTITIONER

## 2020-10-20 PROCEDURE — 3051F HG A1C>EQUAL 7.0%<8.0%: CPT | Mod: CPTII,S$GLB,, | Performed by: NURSE PRACTITIONER

## 2020-10-20 PROCEDURE — 3077F SYST BP >= 140 MM HG: CPT | Mod: CPTII,S$GLB,, | Performed by: NURSE PRACTITIONER

## 2020-10-20 RX ORDER — INSULIN ASPART 100 [IU]/ML
20 INJECTION, SOLUTION INTRAVENOUS; SUBCUTANEOUS
Qty: 5 SYRINGE | Refills: 11 | Status: SHIPPED | OUTPATIENT
Start: 2020-10-20 | End: 2020-11-17 | Stop reason: SDUPTHER

## 2020-10-20 RX ORDER — INSULIN DEGLUDEC 200 U/ML
80 INJECTION, SOLUTION SUBCUTANEOUS DAILY
Qty: 6 SYRINGE | Refills: 6 | Status: SHIPPED | OUTPATIENT
Start: 2020-10-20 | End: 2021-08-10 | Stop reason: SDUPTHER

## 2020-10-20 NOTE — PROGRESS NOTES
Primary Care Provider Appointment    Subjective:      Patient ID: Andra Newell is a 60 y.o. female with history of HTN, HLD, T2DM c/b ESRD s/p living L kidney transplant 1/14/2019, On immunosupressant, DJD, Asthma, hypothyroidism, HPT     Chief Complaint: Follow-up (HTN)    Pt seen today for f/u diabetes with hyperglycemia. Pt has been taking Tresiba 60 units and U-500 40 units BID. Hypoglycemia while asleep have resolved, still with hyperglycemia from 1pm-9pm.     Will increase tresiba to 80 units, d/c u-500 and start novolog 20 units with meals.                   Past Surgical History:   Procedure Laterality Date    ACHILLES TENDON SURGERY Left May 2015    BACK SURGERY      CHOLECYSTECTOMY      COLONOSCOPY N/A 9/6/2017    Procedure: COLONOSCOPY;  Surgeon: Marisol Bryson MD;  Location: [facility];  Service: Endoscopy;  Laterality: N/A; REPEAT IN 3 YEARS FOR SURVEILLANCE    COLONOSCOPY N/A 5/9/2019    Procedure: COLONOSCOPY;  Surgeon: Fady Ewing MD;  [facility]  Service: Endoscopy;  Laterality: N/A;    DIALYSIS FISTULA CREATION  12/2017    ESOPHAGOGASTRODUODENOSCOPY N/A 5/9/2019    Procedure: EGD (ESOPHAGOGASTRODUODENOSCOPY);  Surgeon: Fady Ewing MD;  [facility]  Service: Endoscopy;  Laterality: N/A;    HEMORRHOID SURGERY      INJECTION OF ANESTHETIC AGENT AROUND MEDIAL BRANCH NERVES INNERVATING LUMBAR FACET JOINT Right 9/25/2019    Procedure: Block-nerve-medial branch-lumbar;  Surgeon: Yonny Ferrer MD;  Location: [facility] OR;  Service: Pain Management;  Laterality: Right;  L2, 3, 4,5     INJECTION OF ANESTHETIC AGENT AROUND MEDIAL BRANCH NERVES INNERVATING LUMBAR FACET JOINT Right 10/16/2019    Procedure: Block-nerve-medial branch-lumbar;  Surgeon: Yonny Ferrer MD;  Location: [facility] OR;  Service: Pain Management;  Laterality: Right;  L2, 3, 4,5     JOINT REPLACEMENT      left    KIDNEY TRANSPLANT N/A 1/14/2019    Procedure: TRANSPLANT, KIDNEY;  Surgeon:  Roland Salazar MD;  Location: [facility] OR 2ND FLR;  Service: Transplant;  Laterality: N/A;    KNEE SURGERY      RADIOFREQUENCY ABLATION OF LUMBAR MEDIAL BRANCH NERVE AT SINGLE LEVEL Right 10/29/2019    Procedure: Radiofrequency Ablation, Nerve, Spinal, Lumbar, Medial Branch, 1 Level;  Surgeon: Yonny Ferrer MD;  Location: [facility] OR;  Service: Pain Management;  Laterality: Right;  L2, 3, 4, 5  burned at 80 degrees C X 60 seconds each site X 2    SHOULDER ARTHROSCOPY      SHOULDER SURGERY      UPPER GASTROINTESTINAL ENDOSCOPY  ~2013    Dr. Moralez       Past Medical History:   Diagnosis Date    Anemia     Anemia in chronic kidney disease, on chronic dialysis 7/12/2017    Anxiety and depression     Aplastic anemia with parvovirus B19 infection 5/27/2019    Arthritis     Asthma     allergic airway    CKD stage III (moderate) 2/18/2019    Colon polyp     Depression     Diabetes mellitus     Diverticulosis     Eosinophilia     ESRD (end stage renal disease)     stage V, due for living donor 9/2018    ESRD on dialysis     GERD (gastroesophageal reflux disease)     Gout     Gout     Hyperlipidemia     Hypertension     Hypertriglyceridemia without hypercholesterolemia 6/9/2019    Low back pain     Low HDL (under 40) 6/9/2019    MRSA carrier     Neutropenia, unspecified 5/8/2019    Obesity     Renal disorder     Rotator cuff syndrome--left     Thyroid disease     Ulnar neuropathy of right upper extremity     Uncontrolled type 2 diabetes mellitus with hyperglycemia        Social History     Socioeconomic History    Marital status: Significant Other     Spouse name: Any Alvarez    Number of children: 2    Years of education: Not on file    Highest education level: 12th grade   Occupational History    Occupation: retired     Comment:    Social Needs    Financial resource strain: Not hard at all    Food insecurity     Worry: Never true     Inability: Never true     "Transportation needs     Medical: No     Non-medical: No   Tobacco Use    Smoking status: Never Smoker    Smokeless tobacco: Never Used   Substance and Sexual Activity    Alcohol use: No     Alcohol/week: 0.0 standard drinks     Frequency: Never     Drinks per session: 1 or 2     Binge frequency: Never    Drug use: No    Sexual activity: Not Currently   Lifestyle    Physical activity     Days per week: 0 days     Minutes per session: 0 min    Stress: Very much   Relationships    Social connections     Talks on phone: Three times a week     Gets together: Once a week     Attends Sikhism service: 1 to 4 times per year     Active member of club or organization: No     Attends meetings of clubs or organizations: Never     Relationship status: Living with partner   Other Topics Concern    Not on file   Social History Narrative     female    Disabled since 2015 due to DDD and severe osteoarthritis of knee and hips    Previously worked as  at [employer]    Lives in residential 1 story home with partner, Any.     Has 2 boys, 1 lives in [place], 1 in [place].         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         Review of Systems   Constitutional: Positive for fatigue (ongoing, unchanged from previous). Negative for activity change, appetite change and fever.   Respiratory: Negative for cough and shortness of breath.    Genitourinary: Negative for dysuria.   Musculoskeletal: Positive for back pain (chronic, followed by pain mgmt.).   Allergic/Immunologic: Positive for immunocompromised state (on Cellcept s/p renal transplant.).   Neurological: Negative for dizziness.   Psychiatric/Behavioral: Negative for sleep disturbance.       Objective:   BP (!) 160/80 (BP Location: Right arm, Patient Position: Sitting, BP Method: Large (Manual))   Pulse 78   Temp 97.8 °F (36.6 °C) (Temporal)   Ht 5' 7" (1.702 m)   Wt 101.2 kg (223 lb 1.7 oz)   LMP 02/28/2012   SpO2 97%  "  BMI 34.94 kg/m²     Physical Exam  Vitals signs and nursing note reviewed.   Constitutional:       General: She is not in acute distress.     Appearance: Normal appearance. She is well-developed. She is obese. She is not ill-appearing or diaphoretic.   Abdominal:      Comments: Tenderness with palpation to RUQ   Neurological:      Mental Status: She is alert and oriented to person, place, and time. Mental status is at baseline.      Coordination: Coordination normal.      Gait: Gait normal.   Psychiatric:         Behavior: Behavior normal.         Thought Content: Thought content normal.         Judgment: Judgment normal.         Lab Results   Component Value Date    WBC 6.38 10/13/2020    HGB 12.2 10/13/2020    HCT 39.4 10/13/2020     10/13/2020    CHOL 106 (L) 09/29/2020    TRIG 239 (H) 09/29/2020    HDL 27 (L) 09/29/2020    ALT 32 09/29/2020    AST 32 09/29/2020     10/13/2020    K 4.8 10/13/2020     10/13/2020    CREATININE 1.0 10/13/2020    BUN 20 10/13/2020    CO2 32 (H) 10/13/2020    TSH 0.777 05/12/2020    INR 1.1 05/07/2019    HGBA1C 7.1 (H) 09/29/2020       Medication List with Changes/Refills   New Medications    INSULIN ASPART U-100 (NOVOLOG FLEXPEN U-100 INSULIN) 100 UNIT/ML (3 ML) INPN PEN    Inject 20 Units into the skin 3 (three) times daily with meals.   Current Medications    BLOOD SUGAR DIAGNOSTIC (BLOOD GLUCOSE TEST) STRP    Checks her bg 4 times a day   e11.9    CALCIUM-VITAMIN D3 (OS-DORETHA 500 + D3) 500 MG(1,250MG) -200 UNIT PER TABLET    Take 1 tablet by mouth once daily.    CEFPODOXIME (VANTIN) 200 MG TABLET    Take 1 tablet (200 mg total) by mouth 2 (two) times a day. FOR 7 DAYS    FAMOTIDINE (PEPCID) 20 MG TABLET    Take 1 tablet (20 mg total) by mouth 2 (two) times daily.    HYDROCODONE-ACETAMINOPHEN (NORCO) 7.5-325 MG PER TABLET    Take 1 tablet by mouth every 6 (six) hours as needed for Pain.    LEVOTHYROXINE (SYNTHROID) 75 MCG TABLET    Take 1 tablet (75 mcg total) by  mouth once daily.    LORAZEPAM (ATIVAN) 1 MG TABLET    Take 1 mg by mouth every evening.    MAGNESIUM OXIDE 500 MG TAB    Take 500 mg by mouth once daily.    METFORMIN (GLUCOPHAGE-XR) 500 MG ER 24HR TABLET    TAKE ONE TABLET BY MOUTH TWICE A DAY WITH MEALS     MULTIVITAMIN (THERAGRAN) TABLET    Take 1 tablet by mouth once daily.    PANTOPRAZOLE (PROTONIX) 20 MG TABLET    Take 1 tablet (20 mg total) by mouth once daily.    ROSUVASTATIN (CRESTOR) 10 MG TABLET    Take 1 tablet (10 mg total) by mouth every evening.    TACROLIMUS (PROGRAF) 1 MG CAP    Take 1 capsule (1 mg total) by mouth every 12 (twelve) hours. Z94.0    TIZANIDINE (ZANAFLEX) 2 MG TABLET    Take 15 mg by mouth every evening.    VILAZODONE (VIIBRYD) 40 MG TAB TABLET    Take 1 tablet (40 mg total) by mouth once daily.   Changed and/or Refilled Medications    Modified Medication Previous Medication    INSULIN DEGLUDEC (TRESIBA FLEXTOUCH U-200) 200 UNIT/ML (3 ML) INPN insulin degludec (TRESIBA FLEXTOUCH U-200) 200 unit/mL (3 mL) InPn       Inject 80 Units into the skin once daily.    Inject 50 Units into the skin once daily.   Discontinued Medications    INSULIN REGULAR HUM U-500 CONC (HUMULIN R U-500, CONC, KWIKPEN) 500 UNIT/ML (3 ML) INPN    INJECT 40 UNITS BID         RESULTS: Reviewed labs and images today    Assessment:   60 y.o. female with multiple co-morbid illnesses here to chronic care management.     Plan:     Problem List Items Addressed This Visit        Endocrine    Controlled type 2 diabetes mellitus with complication, with long-term current use of insulin - Primary     Followed by endocrinology  - A1c 7.1 (09/2020)  - Pt has been taking Tresiba 60 units and U-500 40 units BID. Hypoglycemia while asleep have resolved, still with hyperglycemia from 1pm-9pm.   - Will increase tresiba to 80 units, d/c u-500 and start novolog 20 units with meals.              Relevant Medications    insulin degludec (TRESIBA FLEXTOUCH U-200) 200 unit/mL (3 mL)  InPn    insulin aspart U-100 (NOVOLOG FLEXPEN U-100 INSULIN) 100 unit/mL (3 mL) In pen      Other Visit Diagnoses     Uncontrolled type 2 diabetes mellitus with diabetic nephropathy, with long-term current use of insulin        Relevant Medications    insulin degludec (TRESIBA FLEXTOUCH U-200) 200 unit/mL (3 mL) InPn    insulin aspart U-100 (NOVOLOG FLEXPEN U-100 INSULIN) 100 unit/mL (3 mL) In pen          Health Maintenance       Date Due Completion Date    Aspirin/Antiplatelet Therapy 02/15/1976 ---    Shingles Vaccine (1 of 2) 02/15/2008 ---    Eye Exam 09/17/2020 9/17/2019    Foot Exam 12/11/2020 12/11/2019    Override on 10/2/2019: Done    Hemoglobin A1c 12/29/2020 9/29/2020    Lipid Panel 09/29/2021 9/29/2020    Mammogram 12/03/2021 12/3/2019    Pap Smear with HPV Cotest 09/04/2023 9/4/2018    Pneumococcal Vaccine (Highest Risk) (3 of 3 - PPSV23) 04/30/2024 4/30/2019    Colorectal Cancer Screening 05/09/2024 5/9/2019    TETANUS VACCINE 07/12/2027 7/12/2017          Follow up in about 4 weeks (around 11/17/2020). . Total face-to-face time was 20 min, greater than 50% of this was spent on counseling and coordination of care.       Gay Tong, MARCYP-C  Family Medicine  [facility] [place]

## 2020-10-20 NOTE — ASSESSMENT & PLAN NOTE
Followed by endocrinology  - A1c 7.1 (09/2020)  - Pt has been taking Tresiba 60 units and U-500 40 units BID. Hypoglycemia while asleep have resolved, still with hyperglycemia from 1pm-9pm.   - Will increase tresiba to 80 units, d/c u-500 and start novolog 20 units with meals.

## 2020-10-20 NOTE — PATIENT INSTRUCTIONS
- Increase Tresiba to 80 units once daily     - Start novolog 20 units three times a day with meals    - Message if blood sugar stays over 250 or below 90.     - Will look into insulin pump

## 2020-10-22 ENCOUNTER — PATIENT MESSAGE (OUTPATIENT)
Dept: PRIMARY CARE CLINIC | Facility: CLINIC | Age: 60
End: 2020-10-22

## 2020-10-22 ENCOUNTER — PATIENT OUTREACH (OUTPATIENT)
Dept: ADMINISTRATIVE | Facility: OTHER | Age: 60
End: 2020-10-22

## 2020-10-27 ENCOUNTER — LAB VISIT (OUTPATIENT)
Dept: LAB | Facility: HOSPITAL | Age: 60
End: 2020-10-27
Attending: INTERNAL MEDICINE
Payer: MEDICARE

## 2020-10-27 DIAGNOSIS — B34.3 PARVOVIRUS B19 INFECTION: ICD-10-CM

## 2020-10-27 DIAGNOSIS — Z94.0 KIDNEY REPLACED BY TRANSPLANT: ICD-10-CM

## 2020-10-27 LAB
ALBUMIN SERPL BCP-MCNC: 3.6 G/DL (ref 3.5–5.2)
ANION GAP SERPL CALC-SCNC: 8 MMOL/L (ref 8–16)
BASOPHILS # BLD AUTO: 0.05 K/UL (ref 0–0.2)
BASOPHILS NFR BLD: 0.7 % (ref 0–1.9)
BUN SERPL-MCNC: 20 MG/DL (ref 8–23)
CALCIUM SERPL-MCNC: 10 MG/DL (ref 8.7–10.5)
CHLORIDE SERPL-SCNC: 100 MMOL/L (ref 95–110)
CO2 SERPL-SCNC: 32 MMOL/L (ref 23–29)
CREAT SERPL-MCNC: 1.1 MG/DL (ref 0.5–1.4)
DIFFERENTIAL METHOD: ABNORMAL
EOSINOPHIL # BLD AUTO: 1 K/UL (ref 0–0.5)
EOSINOPHIL NFR BLD: 12.6 % (ref 0–8)
ERYTHROCYTE [DISTWIDTH] IN BLOOD BY AUTOMATED COUNT: 13.9 % (ref 11.5–14.5)
EST. GFR  (AFRICAN AMERICAN): >60 ML/MIN/1.73 M^2
EST. GFR  (NON AFRICAN AMERICAN): 53.9 ML/MIN/1.73 M^2
GLUCOSE SERPL-MCNC: 183 MG/DL (ref 70–110)
HCT VFR BLD AUTO: 38.7 % (ref 37–48.5)
HGB BLD-MCNC: 11.8 G/DL (ref 12–16)
IMM GRANULOCYTES # BLD AUTO: 0.02 K/UL (ref 0–0.04)
IMM GRANULOCYTES NFR BLD AUTO: 0.3 % (ref 0–0.5)
LYMPHOCYTES # BLD AUTO: 1.3 K/UL (ref 1–4.8)
LYMPHOCYTES NFR BLD: 17.2 % (ref 18–48)
MAGNESIUM SERPL-MCNC: 1.6 MG/DL (ref 1.6–2.6)
MCH RBC QN AUTO: 28 PG (ref 27–31)
MCHC RBC AUTO-ENTMCNC: 30.5 G/DL (ref 32–36)
MCV RBC AUTO: 92 FL (ref 82–98)
MONOCYTES # BLD AUTO: 0.6 K/UL (ref 0.3–1)
MONOCYTES NFR BLD: 7.8 % (ref 4–15)
NEUTROPHILS # BLD AUTO: 4.7 K/UL (ref 1.8–7.7)
NEUTROPHILS NFR BLD: 61.4 % (ref 38–73)
NRBC BLD-RTO: 0 /100 WBC
PHOSPHATE SERPL-MCNC: 4.2 MG/DL (ref 2.7–4.5)
PLATELET # BLD AUTO: 218 K/UL (ref 150–350)
PMV BLD AUTO: 10.1 FL (ref 9.2–12.9)
POTASSIUM SERPL-SCNC: 4.9 MMOL/L (ref 3.5–5.1)
RBC # BLD AUTO: 4.22 M/UL (ref 4–5.4)
SODIUM SERPL-SCNC: 140 MMOL/L (ref 136–145)
WBC # BLD AUTO: 7.61 K/UL (ref 3.9–12.7)

## 2020-10-27 PROCEDURE — 80069 RENAL FUNCTION PANEL: CPT

## 2020-10-27 PROCEDURE — 36415 COLL VENOUS BLD VENIPUNCTURE: CPT | Mod: PO

## 2020-10-27 PROCEDURE — 83735 ASSAY OF MAGNESIUM: CPT

## 2020-10-27 PROCEDURE — 85025 COMPLETE CBC W/AUTO DIFF WBC: CPT

## 2020-10-27 PROCEDURE — 87799 DETECT AGENT NOS DNA QUANT: CPT

## 2020-10-27 PROCEDURE — 80197 ASSAY OF TACROLIMUS: CPT

## 2020-10-28 LAB — TACROLIMUS BLD-MCNC: 4.4 NG/ML (ref 5–15)

## 2020-11-01 LAB
B19V DNA # SPEC NAA+PROBE: 1710 COPIES/ML
SPECIMEN SOURCE: ABNORMAL

## 2020-11-03 ENCOUNTER — PATIENT MESSAGE (OUTPATIENT)
Dept: SPORTS MEDICINE | Facility: CLINIC | Age: 60
End: 2020-11-03

## 2020-11-03 ENCOUNTER — DOCUMENTATION ONLY (OUTPATIENT)
Dept: TRANSPLANT | Facility: CLINIC | Age: 60
End: 2020-11-03

## 2020-11-03 ENCOUNTER — PATIENT MESSAGE (OUTPATIENT)
Dept: TRANSPLANT | Facility: CLINIC | Age: 60
End: 2020-11-03

## 2020-11-03 ENCOUNTER — PATIENT MESSAGE (OUTPATIENT)
Dept: PRIMARY CARE CLINIC | Facility: CLINIC | Age: 60
End: 2020-11-03

## 2020-11-03 ENCOUNTER — TELEPHONE (OUTPATIENT)
Dept: SPORTS MEDICINE | Facility: CLINIC | Age: 60
End: 2020-11-03

## 2020-11-03 NOTE — TELEPHONE ENCOUNTER
LVM for patient letting her know we received paperwork from her in the portal, and provided fax # if still needed.

## 2020-11-03 NOTE — TELEPHONE ENCOUNTER
----- Message from Charleen Jackson MA sent at 11/3/2020 11:03 AM CST -----    ----- Message -----  From: Moreno Tanner  Sent: 11/3/2020  10:48 AM CST  To: [facility] MARQUES Staff    Pt is requesting a call back, she needs to fax something over.       Contact [employer] [phone] ([email or web address])

## 2020-11-04 ENCOUNTER — TELEPHONE (OUTPATIENT)
Dept: SPORTS MEDICINE | Facility: CLINIC | Age: 60
End: 2020-11-04

## 2020-11-04 ENCOUNTER — PATIENT MESSAGE (OUTPATIENT)
Dept: PRIMARY CARE CLINIC | Facility: CLINIC | Age: 60
End: 2020-11-04

## 2020-11-04 NOTE — TELEPHONE ENCOUNTER
Left a voicemail for the patient regarding her disability paper work. The patient needs to be seen in clinic for follow up before this can be completed.    ----- Message from Maria Snider MA sent at 11/4/2020  6:53 AM CST -----  Regarding: Appt Needed  Good morning,    This patient sent new disability forms for me to fill out. She is permanently disabled so I have to fill them out yearly. That also means she has to see the physician yearly and she has not been seen since 04/2019. Please call to let her know that she will need to see Dr. Meléndez in order to complete her forms and get her an appointment scheduled.    Thank you so much!    Maria Snider MA  [facility]

## 2020-11-05 NOTE — PROGRESS NOTES
Discharge note:  SW met with pt at bedside this AM to discuss any concerns with discharge. Pt was AAOx4 and in high spirits. Pt feels comfortable discharging and is ready to go home. Pt will transport self. Pt is well known to team and will ask for things as needed. SW remains available. No psychosocial concerns at this time.   Pt is calling stating that she is having severe diarrhea that started when taking Exemestane. I sent message to Bridget Mercado pharmacist to discuss.

## 2020-11-11 ENCOUNTER — HOSPITAL ENCOUNTER (OUTPATIENT)
Dept: RADIOLOGY | Facility: HOSPITAL | Age: 60
Discharge: HOME | End: 2020-11-11
Attending: ORTHOPAEDIC SURGERY
Payer: MEDICARE

## 2020-11-11 ENCOUNTER — OFFICE VISIT (OUTPATIENT)
Dept: SPORTS MEDICINE | Facility: CLINIC | Age: 60
End: 2020-11-11
Payer: MEDICARE

## 2020-11-11 VITALS
DIASTOLIC BLOOD PRESSURE: 81 MMHG | HEIGHT: 67 IN | SYSTOLIC BLOOD PRESSURE: 162 MMHG | WEIGHT: 220 LBS | BODY MASS INDEX: 34.53 KG/M2 | HEART RATE: 88 BPM

## 2020-11-11 DIAGNOSIS — G89.29 CHRONIC LEFT SHOULDER PAIN: Primary | ICD-10-CM

## 2020-11-11 DIAGNOSIS — M25.562 PAIN IN BOTH KNEES, UNSPECIFIED CHRONICITY: ICD-10-CM

## 2020-11-11 DIAGNOSIS — G89.29 CHRONIC LEFT SHOULDER PAIN: ICD-10-CM

## 2020-11-11 DIAGNOSIS — M25.512 CHRONIC LEFT SHOULDER PAIN: ICD-10-CM

## 2020-11-11 DIAGNOSIS — M25.561 PAIN IN BOTH KNEES, UNSPECIFIED CHRONICITY: ICD-10-CM

## 2020-11-11 DIAGNOSIS — M25.512 CHRONIC LEFT SHOULDER PAIN: Primary | ICD-10-CM

## 2020-11-11 PROCEDURE — 1125F PR PAIN SEVERITY QUANTIFIED, PAIN PRESENT: ICD-10-PCS | Mod: S$GLB,,, | Performed by: ORTHOPAEDIC SURGERY

## 2020-11-11 PROCEDURE — 3077F SYST BP >= 140 MM HG: CPT | Mod: CPTII,S$GLB,, | Performed by: ORTHOPAEDIC SURGERY

## 2020-11-11 PROCEDURE — 99999 PR PBB SHADOW E&M-EST. PATIENT-LVL IV: CPT | Mod: PBBFAC,,, | Performed by: ORTHOPAEDIC SURGERY

## 2020-11-11 PROCEDURE — 3079F PR MOST RECENT DIASTOLIC BLOOD PRESSURE 80-89 MM HG: ICD-10-PCS | Mod: CPTII,S$GLB,, | Performed by: ORTHOPAEDIC SURGERY

## 2020-11-11 PROCEDURE — 99999 PR PBB SHADOW E&M-EST. PATIENT-LVL IV: ICD-10-PCS | Mod: PBBFAC,,, | Performed by: ORTHOPAEDIC SURGERY

## 2020-11-11 PROCEDURE — 73564 X-RAY EXAM KNEE 4 OR MORE: CPT | Mod: TC,50

## 2020-11-11 PROCEDURE — 99214 PR OFFICE/OUTPT VISIT, EST, LEVL IV, 30-39 MIN: ICD-10-PCS | Mod: S$GLB,,, | Performed by: ORTHOPAEDIC SURGERY

## 2020-11-11 PROCEDURE — 3008F PR BODY MASS INDEX (BMI) DOCUMENTED: ICD-10-PCS | Mod: CPTII,S$GLB,, | Performed by: ORTHOPAEDIC SURGERY

## 2020-11-11 PROCEDURE — 1125F AMNT PAIN NOTED PAIN PRSNT: CPT | Mod: S$GLB,,, | Performed by: ORTHOPAEDIC SURGERY

## 2020-11-11 PROCEDURE — 3008F BODY MASS INDEX DOCD: CPT | Mod: CPTII,S$GLB,, | Performed by: ORTHOPAEDIC SURGERY

## 2020-11-11 PROCEDURE — 73564 XR KNEE ORTHO BILAT WITH FLEXION: ICD-10-PCS | Mod: 26,50,, | Performed by: RADIOLOGY

## 2020-11-11 PROCEDURE — 99499 UNLISTED E&M SERVICE: CPT | Mod: S$GLB,,, | Performed by: ORTHOPAEDIC SURGERY

## 2020-11-11 PROCEDURE — 73564 X-RAY EXAM KNEE 4 OR MORE: CPT | Mod: 26,50,, | Performed by: RADIOLOGY

## 2020-11-11 PROCEDURE — 99214 OFFICE O/P EST MOD 30 MIN: CPT | Mod: S$GLB,,, | Performed by: ORTHOPAEDIC SURGERY

## 2020-11-11 PROCEDURE — 3079F DIAST BP 80-89 MM HG: CPT | Mod: CPTII,S$GLB,, | Performed by: ORTHOPAEDIC SURGERY

## 2020-11-11 PROCEDURE — 99499 RISK ADDL DX/OHS AUDIT: ICD-10-PCS | Mod: S$GLB,,, | Performed by: ORTHOPAEDIC SURGERY

## 2020-11-11 PROCEDURE — 3077F PR MOST RECENT SYSTOLIC BLOOD PRESSURE >= 140 MM HG: ICD-10-PCS | Mod: CPTII,S$GLB,, | Performed by: ORTHOPAEDIC SURGERY

## 2020-11-11 NOTE — PROGRESS NOTES
Subjective:          Chief Complaint: Andra Newell is a 60 y.o. female who had concerns including Pain of the Right Knee.     Patient sees Dr. Meléndez yearly for her disability stemming from her left knee.  She states that the left knee is doing fine but last night fell and struck her right knee against her sooner which she uses to travel extended distances.  She only has pain on the lateral aspect of the right knee and she is able to bear weight.  She denies any previous right knee pain.  She denies any mechanical symptoms or instability.  She denies any constitutional symptoms.    HPI    Review of Systems   Constitution: Negative.   HENT: Negative.    Eyes: Negative.    Cardiovascular: Negative.    Respiratory: Negative.    Endocrine: Negative.    Hematologic/Lymphatic: Negative.    Skin: Negative.    Musculoskeletal: Positive for joint pain.   Gastrointestinal: Negative.    Genitourinary: Negative.    Neurological: Negative.    Psychiatric/Behavioral: Negative.    Allergic/Immunologic: Negative.    All other systems reviewed and are negative.      Pain Related Questions  Over the past 3 days, what was your average pain during activity? (I.e. running, jogging, walking, climbing stairs, getting dressed, ect.): 7  Over the past 3 days, what was your highest pain level?: 7  Over the past 3 days, what was your lowest pain level? : 7    Other  Was the patient's HEIGHT measured or patient reported?: Patient Reported  Was the patient's WEIGHT measured or patient reported?: Measured      Objective:        General: Andra is well-developed, well-nourished, appears stated age, in no acute distress, alert and oriented to time, place and person.     General    Vitals reviewed.  Constitutional: She is oriented to person, place, and time. She appears well-developed and well-nourished. No distress.   HENT:   Nose: Nose normal.   Mouth/Throat: No oropharyngeal exudate.   Eyes: Pupils are equal, round, and reactive to light.  Right eye exhibits no discharge. Left eye exhibits no discharge.   Neck: Normal range of motion.   Cardiovascular: Normal rate and intact distal pulses.    Pulmonary/Chest: Effort normal and breath sounds normal. No respiratory distress.   Neurological: She is alert and oriented to person, place, and time. She has normal reflexes. She displays normal reflexes. No cranial nerve deficit. Coordination normal.   Psychiatric: She has a normal mood and affect. Her behavior is normal. Judgment and thought content normal.     General Musculoskeletal Exam   Gait: normal       Right Knee Exam     Inspection   Erythema: absent  Scars: absent  Swelling: absent  Effusion: absent  Deformity: absent  Bruising: absent    Tenderness   The patient is tender to palpation of the lateral joint line.    Range of Motion   Extension:  10 abnormal   Flexion: 140     Tests   Meniscus   Sonja:  Medial - negative Lateral - negative  Ligament Examination Lachman: normal (-1 to 2mm) PCL-Posterior Drawer: normal (0 to 2mm)     MCL - Valgus: normal (0 to 2mm)  LCL - Varus: normalPivot Shift: normal (Equal)Reverse Pivot Shift: normal (Equal)Dial Test at 30 degrees: normal (< 5 degrees)Dial Test at 90 degrees: normal (< 5 degrees)  Posterior Sag Test: negative  Posterolateral Corner: unstable (>15 degrees difference)  Patella   Patellar apprehension: negative  Passive Patellar Tilt: neutral  Patellar Tracking: normal  Patellar Glide (quadrants): Lateral - 1   Medial - 2  Q-Angle at 90 degrees: normal  Patellar Grind: negative  J-Sign: none    Other   Meniscal Cyst: absent  Popliteal (Baker's) Cyst: absent  Sensation: normal    Left Knee Exam     Inspection   Erythema: absent  Scars: absent  Swelling: absent  Effusion: absent  Deformity: absent  Bruising: absent    Tenderness   The patient is experiencing no tenderness.     Range of Motion   Extension:  10 abnormal   Flexion: 140     Tests   Meniscus   Sonja:  Medial - negative Lateral -  negative  Stability Lachman: normal (-1 to 2mm) PCL-Posterior Drawer: normal (0 to 2mm)  MCL - Valgus: normal (0 to 2mm)  LCL - Varus: normal (0 to 2mm)Pivot Shift: normal (Equal)Reverse Pivot Shift: normal (Equal)Dial Test at 30 degrees: normal (< 5 degrees)Dial Test at 90 degrees: normal (< 5 degrees)  Posterior Sag Test: negative  Posterolateral Corner: unstable (>15 degrees difference)  Patella   Patellar apprehension: negative  Passive Patellar Tilt: neutral  Patellar Tracking: normal  Patellar Glide (Quadrants): Lateral - 1 Medial - 2  Q-Angle at 90 degrees: normal  Patellar Grind: negative  J-Sign: J sign absent    Other   Meniscal Cyst: absent  Popliteal (Baker's) Cyst: absent  Sensation: normal    Right Hip Exam     Tests   Rox: negative  Left Hip Exam     Tests   Rox: negative      Right Shoulder Exam     Inspection/Observation   Swelling: absent  Bruising: absent  Scars: absent  Deformity: absent  Scapular Winging: absent  Scapular Dyskinesia: negative  Atrophy: absent    Tenderness   The patient is experiencing no tenderness.    Range of Motion   Active abduction:  90 normal   Passive abduction:  100 normal   Extension:  0 normal   Forward Flexion:  180 normal   Forward Elevation: 180 normal  Adduction: 40 normal  External Rotation 0 degrees:  50 normal   External Rotation 90 degrees: 90 normal  Internal rotation 0 degrees:  T8 normal   Internal rotation 90 degrees:  30 normal     Tests & Signs   Apprehension: negative  Cross arm: negative  Drop arm: negative  Chen test: negative  Impingement: negative  Sulcus: absent  Anterosuperior Escape: negative  Lag Sign 0 degrees: negative  Lag Sign 90 degrees: negative  Lift Off Sign: negative  Belly Press: negative  Active Compression Test ([place]'s Sign): negative  Yergason's Test: negative  Speed's Test: negative  Anterior Drawer Test: 1+   Posterior Drawer Test: 1+  Relocation 90 degrees: negative  Relocation > 90 degrees: negative  Bear Hug:  negative  Moving Valgus: negative  Jerk Test: negative    Other   Sensation: normal    Left Shoulder Exam     Inspection/Observation   Swelling: absent  Bruising: absent  Scars: absent  Deformity: absent  Scapular Winging: absent  Scapular Dyskinesia: negative  Atrophy: absent    Tenderness   The patient is experiencing no tenderness.     Range of Motion   Active abduction:  90 normal   Passive abduction:  100 normal   Extension:  0 normal   Forward Flexion:  180 normal   Forward Elevation: 180 normal  Adduction: 40 normal  External Rotation 0 degrees:  50 normal   External Rotation 90 degrees: 90 normal  Internal rotation 0 degrees:  T8 normal   Internal rotation 90 degrees:  30 normal     Tests & Signs   Apprehension: negative  Cross arm: negative  Drop arm: negative  Chen test: negative  Impingement: negative  Sulcus: absent  Anterosuperior Escape: negative  Lag Sign 0 degrees: negative  Lag Sign 90 degrees: negative  Lift Off Sign: negative  Belly Press: negative  Active Compression test ([place]'s Sign): negative  Yergasons's Test: negative  Speed's Test: negative  Anterior Drawer Test: 1+  Posterior Drawer Test: 1+  Relocation 90 degrees: negative  Relocation > 90 degrees: negative  Bear Hug: negative  Moving Valgus: negative  Jerk Test: negative    Other   Sensation: normal       Muscle Strength   Right Upper Extremity   Shoulder Abduction: 5/5   Shoulder Internal Rotation: 5/5   Shoulder External Rotation: 5/5   Supraspinatus: 5/5   Subscapularis: 5/5   Biceps: 5/5   Left Upper Extremity  Shoulder Abduction: 5/5   Shoulder Internal Rotation: 5/5   Shoulder External Rotation: 5/5   Supraspinatus: 5/5   Subscapularis: 5/5   Biceps: 5/5   Right Lower Extremity   Hip Abduction: 4/5   Quadriceps:  4/5   Hamstrin/5   Left Lower Extremity   Hip Abduction: 4/5   Quadriceps:  4/5   Hamstrin/5     Reflexes     Left Side  Biceps:  2+  Triceps:  2+  Brachioradialis:  2+  Achilles:  2+  Quadriceps:   2+    Right Side   Biceps:  2+  Triceps:  2+  Brachioradialis:  2+  Achilles:  2+  Quadriceps:  2+    Vascular Exam     Right Pulses  Dorsalis Pedis:      2+  Posterior Tibial:      2+  Radial:                    2+      Left Pulses  Dorsalis Pedis:      2+  Posterior Tibial:      2+  Radial:                    2+      Capillary Refill  Right Hand: normal capillary refill  Left Hand: normal capillary refill    X-ray Knee Ortho Bilateral with Flexion  EXAMINATION:  XR KNEE ORTHO BILAT WITH FLEXION    CLINICAL HISTORY:  Pain in right knee    FINDINGS:  Four views bilateral.    Left: There is a TKA in place good alignment and no complication.    Right: There is mild DJD and a mild varus deformity.  No fracture dislocation bone destruction seen.    Electronically signed by: Mikhail Jones MD  Date:    11/11/2020  Time:    15:25            Assessment:       Encounter Diagnoses   Name Primary?    Pain in both knees, unspecified chronicity     Chronic left shoulder pain Yes          Plan:       1. IKDC, SF-12 and KOOS was filled out today in clinic.     RTC in 1 years with Dr. Moe Meléndez Patient will fill out IKDC, SF-12 and KOOS on return.    2. Knee sleeve provided for compression today    3. Paperwork for disability will be returned to patient on Monday    4. [id number] - Gray, performed a custom orthotic / brace adjustment, fitting and training with the patient. The patient demonstrated understanding and proper care. This was performed for 15 minutes.                Patient questionnaires may have been collected.

## 2020-11-12 ENCOUNTER — PATIENT MESSAGE (OUTPATIENT)
Dept: PRIMARY CARE CLINIC | Facility: CLINIC | Age: 60
End: 2020-11-12

## 2020-11-13 PROBLEM — M25.512 CHRONIC LEFT SHOULDER PAIN: Status: ACTIVE | Noted: 2019-11-19

## 2020-11-13 PROBLEM — G89.29 CHRONIC LEFT SHOULDER PAIN: Status: ACTIVE | Noted: 2019-11-19

## 2020-11-13 NOTE — PROGRESS NOTES
Patient Andra Newell, MRN [id number], was dependent on dialysis (ICD10 Z99.2) at the time of this visit on 11/11/20. This addendum is made to the medical record on 11/13/2020.

## 2020-11-16 NOTE — PROGRESS NOTES
Primary Care Provider Appointment    Subjective:      Patient ID: Andra Newell is a 60 y.o. female  with history of HTN, HLD, T2DM c/b ESRD s/p living L kidney transplant 1/14/2019, On immunosupressant, DJD, Asthma, hypothyroidism, HPT     Chief Complaint: Diabetes    Pt seen today for f/u diabetes. Pt has been taking tresiba to 80 units and novolog 25 units with meals. BS pattern significantly improved. Had 1 isolated incident of hypoglycemia due to not eating dinner. Hyperglycemia also improving. Most problematic between 4-7. Pt usually has a snack at 4 daily (1 oatmeal cream pie, small bag of smart popcorn, or small bag of chips). Pt to monitor bs 1 hour after snack to identify what snack causes significant increase in bs and cut back/eliminate. Will cont insulin as now     Pt has f/u with pain mgmt this Friday. Will try and schedule pain pump placement for week of Christmas while partner is off from work.             Has optometry appt tomorrow - pt to have clinic notes faxed here.     Followed by:  Infectious Disease: Dr. Cammie Kessler. Last seen 9/9/2020. Followed for prior history of Parvo B19 virus infection with subsequent anemia, diarrhea, resolved with course of IVIg.  Parvo B19 VL checked due to anemia, found to be elevated. Patient otherwise feeling well, notes fatigue and SOB, but CBC, kidney, and liver function all normal. Continue to monitor [id number] levels, CBC with diff, CMP q2 weeks. If patient become symptomatic with fevers, hepatitis, pneumonia, myocarditis, anemia, pancytopenia, would consider treatment with IVIG.  Transplant: Dr. Maureen Cabral. Last seen 7/24/2020. Cont CellCept with close monitoring of her white count; low dose of 250 mg, 1 cap twice daily. Continue monitoring for drug toxicity and med-related side effects as well as balance risk of infections vs. Rejection in this very complex case.  Pain Mgmt: Dr. Ruben Nunez. Last seen 05/2020. Next visit 07/20, considering  cannabis vs pain pump.  Optometrist: Dr. Arias. Recently had annual exam, records requested.   Neurology: Dr. Ashish Walden. Last seen 7/29/19. F/u post renal transplant for neurogenic claudication secondary to lumbar stenosis. Has a left side achilles tendon rupture w/o repair. Her gait instability is a combination of both problems. Advised to see back and spine surgery for planning for surgery. Referred to [facility]  Prognostically. f/u with neurology in 3 months.  Urology: Dr. Jacqui Artis. Last seen 4/10/19. Followed for recurrent UTI and urgency. Urethroscopy:  Normal.  Cystoscopy:  Normal bladder mucosa, right dome implanted ureter noted with + efflux. RTC prn   Nephrology: Dr. Heaton: Last visit 05/2019, RTC 01/2020. Will request records.   Psychiatry: Dr. Sheth: Last seen 10/2019. No changes per pt. RTC in 3 mths.   Therapist: Mague Muniz. Sees weekly for CBT.        Past Surgical History:   Procedure Laterality Date    ACHILLES TENDON SURGERY Left May 2015    BACK SURGERY      CHOLECYSTECTOMY      COLONOSCOPY N/A 9/6/2017    Procedure: COLONOSCOPY;  Surgeon: Marisol Bryson MD;  Location: [facility];  Service: Endoscopy;  Laterality: N/A; REPEAT IN 3 YEARS FOR SURVEILLANCE    COLONOSCOPY N/A 5/9/2019    Procedure: COLONOSCOPY;  Surgeon: Fady Ewing MD;  Location: [facility] ([address] [place] [address]);  Service: Endoscopy;  Laterality: N/A;    DIALYSIS FISTULA CREATION  12/2017    ESOPHAGOGASTRODUODENOSCOPY N/A 5/9/2019    Procedure: EGD (ESOPHAGOGASTRODUODENOSCOPY);  Surgeon: Fady Ewing MD;  Location: [facility] ([address] [place] [address]);  Service: Endoscopy;  Laterality: N/A;    HEMORRHOID SURGERY      INJECTION OF ANESTHETIC AGENT AROUND MEDIAL BRANCH NERVES INNERVATING LUMBAR FACET JOINT Right 9/25/2019    Procedure: Block-nerve-medial branch-lumbar;  Surgeon: Yonny Ferrer MD;  Location: [facility];  Service: Pain Management;  Laterality: Right;  L2, 3, 4,5     INJECTION OF ANESTHETIC AGENT AROUND MEDIAL BRANCH  NERVES INNERVATING LUMBAR FACET JOINT Right 10/16/2019    Procedure: Block-nerve-medial branch-lumbar;  Surgeon: Yonny Ferrer MD;  [facility];  Service: Pain Management;  Laterality: Right;  L2, 3, 4,5     JOINT REPLACEMENT      left    KIDNEY TRANSPLANT N/A 1/14/2019    Procedure: TRANSPLANT, KIDNEY;  Surgeon: Roland Salazar MD;  Location: [facility];  Service: Transplant;  Laterality: N/A;    KNEE SURGERY      RADIOFREQUENCY ABLATION OF LUMBAR MEDIAL BRANCH NERVE AT SINGLE LEVEL Right 10/29/2019    Procedure: Radiofrequency Ablation, Nerve, Spinal, Lumbar, Medial Branch, 1 Level;  Surgeon: Yonny Ferrer MD;  Location: [facility];  Service: Pain Management;  Laterality: Right;  L2, 3, 4, 5  burned at 80 degrees C X 60 seconds each site X 2    SHOULDER ARTHROSCOPY      SHOULDER SURGERY      UPPER GASTROINTESTINAL ENDOSCOPY  ~2013    Dr. Moralez       Past Medical History:   Diagnosis Date    Anemia     Anemia in chronic kidney disease, on chronic dialysis 7/12/2017    Anxiety and depression     Aplastic anemia with parvovirus B19 infection 5/27/2019    Arthritis     Asthma     allergic airway    CKD stage III (moderate) 2/18/2019    Colon polyp     Depression     Diabetes mellitus     Diverticulosis     Eosinophilia     ESRD (end stage renal disease)     stage V, due for living donor 9/2018    ESRD on dialysis     GERD (gastroesophageal reflux disease)     Gout     Gout     Hyperlipidemia     Hypertension     Hypertriglyceridemia without hypercholesterolemia 6/9/2019    Low back pain     Low HDL (under 40) 6/9/2019    MRSA carrier     Neutropenia, unspecified 5/8/2019    Obesity     Renal disorder     Rotator cuff syndrome--left     Thyroid disease     Ulnar neuropathy of right upper extremity     Uncontrolled type 2 diabetes mellitus with hyperglycemia        Social History     Socioeconomic History    Marital status: Significant Other     Spouse name: Any Alvarez     Number of children: 2    Years of education: Not on file    Highest education level: 12th grade   Occupational History    Occupation: retired     Comment:    Social Needs    Financial resource strain: Not hard at all    Food insecurity     Worry: Never true     Inability: Never true    Transportation needs     Medical: No     Non-medical: No   Tobacco Use    Smoking status: Never Smoker    Smokeless tobacco: Never Used   Substance and Sexual Activity    Alcohol use: No     Alcohol/week: 0.0 standard drinks     Frequency: Never     Drinks per session: 1 or 2     Binge frequency: Never    Drug use: No    Sexual activity: Not Currently   Lifestyle    Physical activity     Days per week: 0 days     Minutes per session: 0 min    Stress: Very much   Relationships    Social connections     Talks on phone: Three times a week     Gets together: Once a week     Attends Anabaptism service: 1 to 4 times per year     Active member of club or organization: No     Attends meetings of clubs or organizations: Never     Relationship status: Living with partner   Other Topics Concern    Not on file   Social History Narrative     female    Disabled since 2015 due to DDD and severe osteoarthritis of knee and hips    Previously worked as  at [employer]    Lives in residential 1 story home with partner, Any.     Has 2 boys, 1 lives in [place], 1 in [place].         Baptism: Yazidi    Hobbies: painting and crafts.         Review of Systems   Constitutional: Negative for activity change, appetite change, fatigue (ongoing, unchanged from previous) and fever.   HENT: Negative for congestion.    Respiratory: Negative for cough and shortness of breath.    Cardiovascular: Negative for leg swelling.   Gastrointestinal: Negative for abdominal pain, diarrhea and nausea.   Genitourinary: Negative for dysuria.   Musculoskeletal: Positive for back pain (chronic, followed by pain  "mgmt.).   Allergic/Immunologic: Positive for immunocompromised state (on Cellcept s/p renal transplant.).   Neurological: Negative for dizziness.   Psychiatric/Behavioral: Negative for sleep disturbance.       Objective:   /80 (BP Location: Right arm, Patient Position: Sitting, BP Method: Large (Manual))   Pulse 71   Temp 97.9 °F (36.6 °C) (Temporal)   Ht 5' 7" (1.702 m)   Wt 101.1 kg (222 lb 14.2 oz)   LMP 02/28/2012   SpO2 96%   BMI 34.91 kg/m²     Physical Exam  Vitals signs and nursing note reviewed.   Constitutional:       General: She is not in acute distress.     Appearance: Normal appearance. She is well-developed. She is obese. She is not ill-appearing or diaphoretic.   Skin:     Capillary Refill: Capillary refill takes less than 2 seconds.   Neurological:      Mental Status: She is alert and oriented to person, place, and time. Mental status is at baseline.      Coordination: Coordination normal.      Gait: Gait normal.   Psychiatric:         Behavior: Behavior normal.         Thought Content: Thought content normal.         Judgment: Judgment normal.         Lab Results   Component Value Date    WBC 7.28 11/11/2020    HGB 11.9 (L) 11/11/2020    HCT 38.7 11/11/2020     11/11/2020    CHOL 106 (L) 09/29/2020    TRIG 239 (H) 09/29/2020    HDL 27 (L) 09/29/2020    ALT 32 09/29/2020    AST 32 09/29/2020     11/11/2020    K 5.2 (H) 11/11/2020     11/11/2020    CREATININE 1.0 11/11/2020    BUN 19 11/11/2020    CO2 31 (H) 11/11/2020    TSH 0.777 05/12/2020    INR 1.1 05/07/2019    HGBA1C 7.1 (H) 09/29/2020       Medication List with Changes/Refills   Current Medications    BLOOD SUGAR DIAGNOSTIC (BLOOD GLUCOSE TEST) STRP    Checks her bg 4 times a day   e11.9    CALCIUM-VITAMIN D3 (OS-DORETHA 500 + D3) 500 MG(1,250MG) -200 UNIT PER TABLET    Take 1 tablet by mouth once daily.    CEFPODOXIME (VANTIN) 200 MG TABLET    Take 1 tablet (200 mg total) by mouth 2 (two) times a day. FOR 7 DAYS "    FAMOTIDINE (PEPCID) 20 MG TABLET    Take 1 tablet (20 mg total) by mouth 2 (two) times daily.    HYDROCODONE-ACETAMINOPHEN (NORCO) 7.5-325 MG PER TABLET    Take 1 tablet by mouth every 6 (six) hours as needed for Pain.    INSULIN DEGLUDEC (TRESIBA FLEXTOUCH U-200) 200 UNIT/ML (3 ML) INPN    Inject 80 Units into the skin once daily.    LEVOTHYROXINE (SYNTHROID) 75 MCG TABLET    Take 1 tablet (75 mcg total) by mouth once daily.    LORAZEPAM (ATIVAN) 1 MG TABLET    Take 1 mg by mouth every evening.    MAGNESIUM OXIDE 500 MG TAB    Take 500 mg by mouth once daily.    MULTIVITAMIN (THERAGRAN) TABLET    Take 1 tablet by mouth once daily.    PANTOPRAZOLE (PROTONIX) 20 MG TABLET    Take 1 tablet (20 mg total) by mouth once daily.    ROSUVASTATIN (CRESTOR) 10 MG TABLET    Take 1 tablet (10 mg total) by mouth every evening.    TACROLIMUS (PROGRAF) 1 MG CAP    Take 1 capsule (1 mg total) by mouth every 12 (twelve) hours. Z94.0    TIZANIDINE (ZANAFLEX) 4 MG TABLET    Take 1 tablet by mouth every evening.    VILAZODONE (VIIBRYD) 40 MG TAB TABLET    Take 1 tablet (40 mg total) by mouth once daily.   Changed and/or Refilled Medications    Modified Medication Previous Medication    INSULIN ASPART U-100 (NOVOLOG FLEXPEN U-100 INSULIN) 100 UNIT/ML (3 ML) INPN PEN insulin aspart U-100 (NOVOLOG FLEXPEN U-100 INSULIN) 100 unit/mL (3 mL) InPn pen       Inject 25 Units into the skin 3 (three) times daily with meals.    Inject 20 Units into the skin 3 (three) times daily with meals.    METFORMIN (GLUCOPHAGE-XR) 500 MG ER 24HR TABLET metFORMIN (GLUCOPHAGE-XR) 500 MG ER 24hr tablet       Take 1 tablet (500 mg total) by mouth 2 (two) times daily with meals.    TAKE ONE TABLET BY MOUTH TWICE A DAY WITH MEALS    Discontinued Medications    TIZANIDINE (ZANAFLEX) 2 MG TABLET    Take 15 mg by mouth every evening.         RESULTS: Reviewed labs and images today    Assessment:   60 y.o. female with multiple co-morbid illnesses here to chronic  care management.     Plan:     Problem List Items Addressed This Visit        Pulmonary    Mild intermittent asthma without complication     - Well controlled, denies recent exacerbation   - Lungs ctab  - pt educated on symptoms of exacerbation and when to call clinic, action plan discussed  - Stable, Will cont to monitor             Cardiac/Vascular    Mixed diabetic hyperlipidemia associated with type 2 diabetes mellitus    Relevant Medications    metFORMIN (GLUCOPHAGE-XR) 500 MG ER 24hr tablet    insulin aspart U-100 (NOVOLOG FLEXPEN U-100 INSULIN) 100 unit/mL (3 mL) InPn pen       Endocrine    Controlled type 2 diabetes mellitus with complication, with long-term current use of insulin - Primary     Followed by endocrinology  - A1c 7.1 (09/2020)  - Pt has been taking Tresiba 80 units and novolog 25 units with meals.   - BS pattern significantly improved. Had 1 isolated incident of hypoglycemia due to not eating dinner. - Hyperglycemia also improving. Most problematic between 4-7. Pt usually has a snack at 4 daily (1 oatmeal cream pie, small bag of smart popcorn, or small bag of chips). Pt to monitor bs 1 hour after snack to identify what snack causes significant increase in bs and cut back/eliminate.   - Will cont insulin as now            Relevant Medications    metFORMIN (GLUCOPHAGE-XR) 500 MG ER 24hr tablet    insulin aspart U-100 (NOVOLOG FLEXPEN U-100 INSULIN) 100 unit/mL (3 mL) InPn pen    Other Relevant Orders    Ambulatory referral/consult to Podiatry          Health Maintenance       Date Due Completion Date    Aspirin/Antiplatelet Therapy 02/15/1976 ---    Shingles Vaccine (1 of 2) 02/15/2008 ---    Eye Exam 09/17/2020 9/17/2019    Foot Exam 12/11/2020 12/11/2019    Override on 10/2/2019: Done    Hemoglobin A1c 12/29/2020 9/29/2020    Lipid Panel 09/29/2021 9/29/2020    Mammogram 12/03/2021 12/3/2019    Pap Smear with HPV Cotest 09/04/2023 9/4/2018    Pneumococcal Vaccine (Highest Risk) (3 of 3 -  PPSV23) 04/30/2024 4/30/2019    Colorectal Cancer Screening 05/09/2024 5/9/2019    TETANUS VACCINE 07/12/2027 7/12/2017          Follow up in about 3 months (around 2/17/2021). . Total face-to-face time was 23 min, greater than 50% of this was spent on counseling and coordination of care.       GHULAM Aceves-C  Family Medicine  [facility]

## 2020-11-17 ENCOUNTER — OFFICE VISIT (OUTPATIENT)
Dept: PRIMARY CARE CLINIC | Facility: CLINIC | Age: 60
End: 2020-11-17
Payer: MEDICARE

## 2020-11-17 ENCOUNTER — PATIENT MESSAGE (OUTPATIENT)
Dept: SPORTS MEDICINE | Facility: CLINIC | Age: 60
End: 2020-11-17

## 2020-11-17 VITALS
SYSTOLIC BLOOD PRESSURE: 135 MMHG | HEIGHT: 67 IN | WEIGHT: 222.88 LBS | TEMPERATURE: 98 F | OXYGEN SATURATION: 96 % | DIASTOLIC BLOOD PRESSURE: 80 MMHG | BODY MASS INDEX: 34.98 KG/M2 | HEART RATE: 71 BPM

## 2020-11-17 DIAGNOSIS — E78.2 MIXED DIABETIC HYPERLIPIDEMIA ASSOCIATED WITH TYPE 2 DIABETES MELLITUS: ICD-10-CM

## 2020-11-17 DIAGNOSIS — Z79.4 CONTROLLED TYPE 2 DIABETES MELLITUS WITH COMPLICATION, WITH LONG-TERM CURRENT USE OF INSULIN: Primary | ICD-10-CM

## 2020-11-17 DIAGNOSIS — E11.69 MIXED DIABETIC HYPERLIPIDEMIA ASSOCIATED WITH TYPE 2 DIABETES MELLITUS: ICD-10-CM

## 2020-11-17 DIAGNOSIS — J45.20 MILD INTERMITTENT ASTHMA WITHOUT COMPLICATION: ICD-10-CM

## 2020-11-17 DIAGNOSIS — E11.8 CONTROLLED TYPE 2 DIABETES MELLITUS WITH COMPLICATION, WITH LONG-TERM CURRENT USE OF INSULIN: Primary | ICD-10-CM

## 2020-11-17 PROCEDURE — 99499 UNLISTED E&M SERVICE: CPT | Mod: S$GLB,,, | Performed by: NURSE PRACTITIONER

## 2020-11-17 PROCEDURE — 3008F BODY MASS INDEX DOCD: CPT | Mod: CPTII,S$GLB,, | Performed by: NURSE PRACTITIONER

## 2020-11-17 PROCEDURE — 3075F PR MOST RECENT SYSTOLIC BLOOD PRESS GE 130-139MM HG: ICD-10-PCS | Mod: CPTII,S$GLB,, | Performed by: NURSE PRACTITIONER

## 2020-11-17 PROCEDURE — 99213 PR OFFICE/OUTPT VISIT, EST, LEVL III, 20-29 MIN: ICD-10-PCS | Mod: S$GLB,,, | Performed by: NURSE PRACTITIONER

## 2020-11-17 PROCEDURE — 99213 OFFICE O/P EST LOW 20 MIN: CPT | Mod: S$GLB,,, | Performed by: NURSE PRACTITIONER

## 2020-11-17 PROCEDURE — 3079F PR MOST RECENT DIASTOLIC BLOOD PRESSURE 80-89 MM HG: ICD-10-PCS | Mod: CPTII,S$GLB,, | Performed by: NURSE PRACTITIONER

## 2020-11-17 PROCEDURE — 3051F HG A1C>EQUAL 7.0%<8.0%: CPT | Mod: CPTII,S$GLB,, | Performed by: NURSE PRACTITIONER

## 2020-11-17 PROCEDURE — 3075F SYST BP GE 130 - 139MM HG: CPT | Mod: CPTII,S$GLB,, | Performed by: NURSE PRACTITIONER

## 2020-11-17 PROCEDURE — 3008F PR BODY MASS INDEX (BMI) DOCUMENTED: ICD-10-PCS | Mod: CPTII,S$GLB,, | Performed by: NURSE PRACTITIONER

## 2020-11-17 PROCEDURE — 3079F DIAST BP 80-89 MM HG: CPT | Mod: CPTII,S$GLB,, | Performed by: NURSE PRACTITIONER

## 2020-11-17 PROCEDURE — 99499 RISK ADDL DX/OHS AUDIT: ICD-10-PCS | Mod: S$GLB,,, | Performed by: NURSE PRACTITIONER

## 2020-11-17 PROCEDURE — 3051F PR MOST RECENT HEMOGLOBIN A1C LEVEL 7.0 - < 8.0%: ICD-10-PCS | Mod: CPTII,S$GLB,, | Performed by: NURSE PRACTITIONER

## 2020-11-17 PROCEDURE — 1125F AMNT PAIN NOTED PAIN PRSNT: CPT | Mod: S$GLB,,, | Performed by: NURSE PRACTITIONER

## 2020-11-17 PROCEDURE — 1125F PR PAIN SEVERITY QUANTIFIED, PAIN PRESENT: ICD-10-PCS | Mod: S$GLB,,, | Performed by: NURSE PRACTITIONER

## 2020-11-17 RX ORDER — TIZANIDINE 4 MG/1
1 TABLET ORAL NIGHTLY
COMMUNITY
Start: 2020-10-27

## 2020-11-17 RX ORDER — METFORMIN HYDROCHLORIDE 500 MG/1
500 TABLET, EXTENDED RELEASE ORAL 2 TIMES DAILY WITH MEALS
Qty: 180 TABLET | Refills: 3 | Status: SHIPPED | OUTPATIENT
Start: 2020-11-17 | End: 2021-10-12

## 2020-11-17 RX ORDER — INSULIN ASPART 100 [IU]/ML
25 INJECTION, SOLUTION INTRAVENOUS; SUBCUTANEOUS
Qty: 8 SYRINGE | Refills: 11 | Status: SHIPPED | OUTPATIENT
Start: 2020-11-17 | End: 2021-05-18 | Stop reason: SDUPTHER

## 2020-11-17 NOTE — ASSESSMENT & PLAN NOTE
- Well controlled, denies recent exacerbation   - Lungs ctab  - pt educated on symptoms of exacerbation and when to call clinic, action plan discussed  - Stable, Will cont to monitor

## 2020-11-17 NOTE — ASSESSMENT & PLAN NOTE
Followed by endocrinology  - A1c 7.1 (09/2020)  - Pt has been taking Tresiba 80 units and novolog 25 units with meals.   - BS pattern significantly improved. Had 1 isolated incident of hypoglycemia due to not eating dinner. - Hyperglycemia also improving. Most problematic between 4-7. Pt usually has a snack at 4 daily (1 oatmeal cream pie, small bag of smart popcorn, or small bag of chips). Pt to monitor bs 1 hour after snack to identify what snack causes significant increase in bs and cut back/eliminate.   - Will cont insulin as now

## 2020-11-18 ENCOUNTER — PATIENT MESSAGE (OUTPATIENT)
Dept: PRIMARY CARE CLINIC | Facility: CLINIC | Age: 60
End: 2020-11-18

## 2020-11-18 LAB
LEFT EYE DM RETINOPATHY: NEGATIVE
RIGHT EYE DM RETINOPATHY: NEGATIVE

## 2020-11-19 ENCOUNTER — PATIENT OUTREACH (OUTPATIENT)
Dept: ADMINISTRATIVE | Facility: HOSPITAL | Age: 60
End: 2020-11-19

## 2020-11-29 ENCOUNTER — PATIENT MESSAGE (OUTPATIENT)
Dept: PRIMARY CARE CLINIC | Facility: CLINIC | Age: 60
End: 2020-11-29

## 2020-12-02 ENCOUNTER — LAB VISIT (OUTPATIENT)
Dept: LAB | Facility: HOSPITAL | Age: 60
End: 2020-12-02
Attending: INTERNAL MEDICINE
Payer: MEDICARE

## 2020-12-02 DIAGNOSIS — B34.3 PARVOVIRUS B19 INFECTION: ICD-10-CM

## 2020-12-02 DIAGNOSIS — Z94.0 KIDNEY REPLACED BY TRANSPLANT: ICD-10-CM

## 2020-12-02 LAB
ALBUMIN SERPL BCP-MCNC: 4 G/DL (ref 3.5–5.2)
ANION GAP SERPL CALC-SCNC: 10 MMOL/L (ref 8–16)
BASOPHILS # BLD AUTO: 0.06 K/UL (ref 0–0.2)
BASOPHILS NFR BLD: 0.8 % (ref 0–1.9)
BUN SERPL-MCNC: 18 MG/DL (ref 8–23)
CALCIUM SERPL-MCNC: 9.3 MG/DL (ref 8.7–10.5)
CHLORIDE SERPL-SCNC: 104 MMOL/L (ref 95–110)
CO2 SERPL-SCNC: 29 MMOL/L (ref 23–29)
CREAT SERPL-MCNC: 1 MG/DL (ref 0.5–1.4)
DIFFERENTIAL METHOD: ABNORMAL
EOSINOPHIL # BLD AUTO: 0.8 K/UL (ref 0–0.5)
EOSINOPHIL NFR BLD: 11.7 % (ref 0–8)
ERYTHROCYTE [DISTWIDTH] IN BLOOD BY AUTOMATED COUNT: 14.9 % (ref 11.5–14.5)
EST. GFR  (AFRICAN AMERICAN): >60 ML/MIN/1.73 M^2
EST. GFR  (NON AFRICAN AMERICAN): >60 ML/MIN/1.73 M^2
GLUCOSE SERPL-MCNC: 155 MG/DL (ref 70–110)
HCT VFR BLD AUTO: 39 % (ref 37–48.5)
HGB BLD-MCNC: 11.9 G/DL (ref 12–16)
IMM GRANULOCYTES # BLD AUTO: 0.04 K/UL (ref 0–0.04)
IMM GRANULOCYTES NFR BLD AUTO: 0.6 % (ref 0–0.5)
LYMPHOCYTES # BLD AUTO: 1.3 K/UL (ref 1–4.8)
LYMPHOCYTES NFR BLD: 18.8 % (ref 18–48)
MAGNESIUM SERPL-MCNC: 1.6 MG/DL (ref 1.6–2.6)
MCH RBC QN AUTO: 28 PG (ref 27–31)
MCHC RBC AUTO-ENTMCNC: 30.5 G/DL (ref 32–36)
MCV RBC AUTO: 92 FL (ref 82–98)
MONOCYTES # BLD AUTO: 0.4 K/UL (ref 0.3–1)
MONOCYTES NFR BLD: 5.8 % (ref 4–15)
NEUTROPHILS # BLD AUTO: 4.4 K/UL (ref 1.8–7.7)
NEUTROPHILS NFR BLD: 62.3 % (ref 38–73)
NRBC BLD-RTO: 0 /100 WBC
PHOSPHATE SERPL-MCNC: 4.1 MG/DL (ref 2.7–4.5)
PLATELET # BLD AUTO: 210 K/UL (ref 150–350)
PMV BLD AUTO: 10.3 FL (ref 9.2–12.9)
POTASSIUM SERPL-SCNC: 4.7 MMOL/L (ref 3.5–5.1)
RBC # BLD AUTO: 4.25 M/UL (ref 4–5.4)
SODIUM SERPL-SCNC: 143 MMOL/L (ref 136–145)
WBC # BLD AUTO: 7.08 K/UL (ref 3.9–12.7)

## 2020-12-02 PROCEDURE — 83735 ASSAY OF MAGNESIUM: CPT

## 2020-12-02 PROCEDURE — 80069 RENAL FUNCTION PANEL: CPT

## 2020-12-02 PROCEDURE — 85025 COMPLETE CBC W/AUTO DIFF WBC: CPT

## 2020-12-02 PROCEDURE — 36415 COLL VENOUS BLD VENIPUNCTURE: CPT | Mod: PO

## 2020-12-02 PROCEDURE — 87799 DETECT AGENT NOS DNA QUANT: CPT

## 2020-12-02 PROCEDURE — 80197 ASSAY OF TACROLIMUS: CPT

## 2020-12-03 LAB — TACROLIMUS BLD-MCNC: 5.9 NG/ML (ref 5–15)

## 2020-12-09 LAB
B19V DNA # SPEC NAA+PROBE: 886 COPIES/ML
SPECIMEN SOURCE: ABNORMAL

## 2020-12-10 ENCOUNTER — DOCUMENTATION ONLY (OUTPATIENT)
Dept: TRANSPLANT | Facility: CLINIC | Age: 60
End: 2020-12-10

## 2020-12-16 ENCOUNTER — LAB VISIT (OUTPATIENT)
Dept: LAB | Facility: HOSPITAL | Age: 60
End: 2020-12-16
Attending: INTERNAL MEDICINE
Payer: MEDICARE

## 2020-12-16 DIAGNOSIS — B34.3 PARVOVIRUS B19 INFECTION: ICD-10-CM

## 2020-12-16 DIAGNOSIS — Z94.0 KIDNEY REPLACED BY TRANSPLANT: ICD-10-CM

## 2020-12-16 LAB
ALBUMIN SERPL BCP-MCNC: 4 G/DL (ref 3.5–5.2)
ANION GAP SERPL CALC-SCNC: 9 MMOL/L (ref 8–16)
BASOPHILS # BLD AUTO: 0.06 K/UL (ref 0–0.2)
BASOPHILS NFR BLD: 0.8 % (ref 0–1.9)
BUN SERPL-MCNC: 19 MG/DL (ref 8–23)
CALCIUM SERPL-MCNC: 9.3 MG/DL (ref 8.7–10.5)
CHLORIDE SERPL-SCNC: 102 MMOL/L (ref 95–110)
CO2 SERPL-SCNC: 30 MMOL/L (ref 23–29)
CREAT SERPL-MCNC: 1 MG/DL (ref 0.5–1.4)
DIFFERENTIAL METHOD: ABNORMAL
EOSINOPHIL # BLD AUTO: 0.9 K/UL (ref 0–0.5)
EOSINOPHIL NFR BLD: 11.2 % (ref 0–8)
ERYTHROCYTE [DISTWIDTH] IN BLOOD BY AUTOMATED COUNT: 15.1 % (ref 11.5–14.5)
EST. GFR  (AFRICAN AMERICAN): >60 ML/MIN/1.73 M^2
EST. GFR  (NON AFRICAN AMERICAN): >60 ML/MIN/1.73 M^2
GLUCOSE SERPL-MCNC: 188 MG/DL (ref 70–110)
HCT VFR BLD AUTO: 37.5 % (ref 37–48.5)
HGB BLD-MCNC: 11.7 G/DL (ref 12–16)
IMM GRANULOCYTES # BLD AUTO: 0.04 K/UL (ref 0–0.04)
IMM GRANULOCYTES NFR BLD AUTO: 0.5 % (ref 0–0.5)
LYMPHOCYTES # BLD AUTO: 1.1 K/UL (ref 1–4.8)
LYMPHOCYTES NFR BLD: 14.7 % (ref 18–48)
MAGNESIUM SERPL-MCNC: 1.4 MG/DL (ref 1.6–2.6)
MCH RBC QN AUTO: 28.3 PG (ref 27–31)
MCHC RBC AUTO-ENTMCNC: 31.2 G/DL (ref 32–36)
MCV RBC AUTO: 91 FL (ref 82–98)
MONOCYTES # BLD AUTO: 0.4 K/UL (ref 0.3–1)
MONOCYTES NFR BLD: 5.7 % (ref 4–15)
NEUTROPHILS # BLD AUTO: 5.2 K/UL (ref 1.8–7.7)
NEUTROPHILS NFR BLD: 67.1 % (ref 38–73)
NRBC BLD-RTO: 0 /100 WBC
PHOSPHATE SERPL-MCNC: 3.5 MG/DL (ref 2.7–4.5)
PLATELET # BLD AUTO: 195 K/UL (ref 150–350)
PMV BLD AUTO: 10.2 FL (ref 9.2–12.9)
POTASSIUM SERPL-SCNC: 4.8 MMOL/L (ref 3.5–5.1)
RBC # BLD AUTO: 4.14 M/UL (ref 4–5.4)
SODIUM SERPL-SCNC: 141 MMOL/L (ref 136–145)
WBC # BLD AUTO: 7.74 K/UL (ref 3.9–12.7)

## 2020-12-16 PROCEDURE — 85025 COMPLETE CBC W/AUTO DIFF WBC: CPT

## 2020-12-16 PROCEDURE — 80197 ASSAY OF TACROLIMUS: CPT

## 2020-12-16 PROCEDURE — 87799 DETECT AGENT NOS DNA QUANT: CPT

## 2020-12-16 PROCEDURE — 36415 COLL VENOUS BLD VENIPUNCTURE: CPT | Mod: PO

## 2020-12-16 PROCEDURE — 83735 ASSAY OF MAGNESIUM: CPT

## 2020-12-16 PROCEDURE — 80069 RENAL FUNCTION PANEL: CPT

## 2020-12-17 LAB — TACROLIMUS BLD-MCNC: 4.4 NG/ML (ref 5–15)

## 2020-12-25 LAB
B19V DNA # SPEC NAA+PROBE: 1200 COPIES/ML
SPECIMEN SOURCE: ABNORMAL

## 2020-12-29 ENCOUNTER — PATIENT MESSAGE (OUTPATIENT)
Dept: TRANSPLANT | Facility: CLINIC | Age: 60
End: 2020-12-29

## 2021-01-06 ENCOUNTER — PATIENT MESSAGE (OUTPATIENT)
Dept: PRIMARY CARE CLINIC | Facility: CLINIC | Age: 61
End: 2021-01-06

## 2021-01-07 ENCOUNTER — OFFICE VISIT (OUTPATIENT)
Dept: PODIATRY | Facility: CLINIC | Age: 61
End: 2021-01-07
Payer: MEDICARE

## 2021-01-07 ENCOUNTER — HOSPITAL ENCOUNTER (OUTPATIENT)
Dept: RADIOLOGY | Facility: CLINIC | Age: 61
Discharge: HOME | End: 2021-01-07
Attending: PODIATRIST
Payer: MEDICARE

## 2021-01-07 VITALS — HEIGHT: 61 IN | BODY MASS INDEX: 42.11 KG/M2 | OXYGEN SATURATION: 97 % | HEART RATE: 73 BPM

## 2021-01-07 DIAGNOSIS — E11.8 CONTROLLED TYPE 2 DIABETES MELLITUS WITH COMPLICATION, WITH LONG-TERM CURRENT USE OF INSULIN: Primary | ICD-10-CM

## 2021-01-07 DIAGNOSIS — Z79.4 CONTROLLED TYPE 2 DIABETES MELLITUS WITH COMPLICATION, WITH LONG-TERM CURRENT USE OF INSULIN: Primary | ICD-10-CM

## 2021-01-07 DIAGNOSIS — M76.60 ACHILLES TENDON PAIN: ICD-10-CM

## 2021-01-07 DIAGNOSIS — S86.012D RUPTURE OF LEFT ACHILLES TENDON, SUBSEQUENT ENCOUNTER: ICD-10-CM

## 2021-01-07 DIAGNOSIS — R29.898 WEAKNESS OF LEFT LOWER EXTREMITY: ICD-10-CM

## 2021-01-07 PROCEDURE — 1125F PR PAIN SEVERITY QUANTIFIED, PAIN PRESENT: ICD-10-PCS | Mod: S$GLB,,, | Performed by: PODIATRIST

## 2021-01-07 PROCEDURE — 73610 X-RAY EXAM OF ANKLE: CPT | Mod: LT,S$GLB,, | Performed by: RADIOLOGY

## 2021-01-07 PROCEDURE — 3008F PR BODY MASS INDEX (BMI) DOCUMENTED: ICD-10-PCS | Mod: CPTII,S$GLB,, | Performed by: PODIATRIST

## 2021-01-07 PROCEDURE — 3051F HG A1C>EQUAL 7.0%<8.0%: CPT | Mod: CPTII,S$GLB,, | Performed by: PODIATRIST

## 2021-01-07 PROCEDURE — 99203 OFFICE O/P NEW LOW 30 MIN: CPT | Mod: S$GLB,,, | Performed by: PODIATRIST

## 2021-01-07 PROCEDURE — 3051F PR MOST RECENT HEMOGLOBIN A1C LEVEL 7.0 - < 8.0%: ICD-10-PCS | Mod: CPTII,S$GLB,, | Performed by: PODIATRIST

## 2021-01-07 PROCEDURE — 3008F BODY MASS INDEX DOCD: CPT | Mod: CPTII,S$GLB,, | Performed by: PODIATRIST

## 2021-01-07 PROCEDURE — 73610 XR ANKLE COMPLETE 3 VIEW LEFT: ICD-10-PCS | Mod: LT,S$GLB,, | Performed by: RADIOLOGY

## 2021-01-07 PROCEDURE — 99203 PR OFFICE/OUTPT VISIT, NEW, LEVL III, 30-44 MIN: ICD-10-PCS | Mod: S$GLB,,, | Performed by: PODIATRIST

## 2021-01-07 PROCEDURE — 1125F AMNT PAIN NOTED PAIN PRSNT: CPT | Mod: S$GLB,,, | Performed by: PODIATRIST

## 2021-01-08 ENCOUNTER — PATIENT MESSAGE (OUTPATIENT)
Dept: TRANSPLANT | Facility: CLINIC | Age: 61
End: 2021-01-08

## 2021-01-13 ENCOUNTER — LAB VISIT (OUTPATIENT)
Dept: LAB | Facility: HOSPITAL | Age: 61
End: 2021-01-13
Attending: INTERNAL MEDICINE
Payer: MEDICARE

## 2021-01-13 DIAGNOSIS — B34.3 PARVOVIRUS B19 INFECTION: ICD-10-CM

## 2021-01-13 DIAGNOSIS — Z94.0 KIDNEY REPLACED BY TRANSPLANT: ICD-10-CM

## 2021-01-13 LAB
ALBUMIN SERPL BCP-MCNC: 3.8 G/DL (ref 3.5–5.2)
ALBUMIN SERPL BCP-MCNC: 3.8 G/DL (ref 3.5–5.2)
ALP SERPL-CCNC: 146 U/L (ref 55–135)
ALT SERPL W/O P-5'-P-CCNC: 40 U/L (ref 10–44)
ANION GAP SERPL CALC-SCNC: 10 MMOL/L (ref 8–16)
AST SERPL-CCNC: 37 U/L (ref 10–40)
BASOPHILS # BLD AUTO: 0.04 K/UL (ref 0–0.2)
BASOPHILS NFR BLD: 0.6 % (ref 0–1.9)
BILIRUB DIRECT SERPL-MCNC: 0.1 MG/DL (ref 0.1–0.3)
BILIRUB SERPL-MCNC: 0.3 MG/DL (ref 0.1–1)
BUN SERPL-MCNC: 19 MG/DL (ref 8–23)
CALCIUM SERPL-MCNC: 9.4 MG/DL (ref 8.7–10.5)
CHLORIDE SERPL-SCNC: 103 MMOL/L (ref 95–110)
CO2 SERPL-SCNC: 30 MMOL/L (ref 23–29)
CREAT SERPL-MCNC: 1 MG/DL (ref 0.5–1.4)
DIFFERENTIAL METHOD: ABNORMAL
EOSINOPHIL # BLD AUTO: 0.7 K/UL (ref 0–0.5)
EOSINOPHIL NFR BLD: 11 % (ref 0–8)
ERYTHROCYTE [DISTWIDTH] IN BLOOD BY AUTOMATED COUNT: 15.1 % (ref 11.5–14.5)
EST. GFR  (AFRICAN AMERICAN): >60 ML/MIN/1.73 M^2
EST. GFR  (NON AFRICAN AMERICAN): >60 ML/MIN/1.73 M^2
GLUCOSE SERPL-MCNC: 233 MG/DL (ref 70–110)
HCT VFR BLD AUTO: 39.2 % (ref 37–48.5)
HGB BLD-MCNC: 11.9 G/DL (ref 12–16)
IMM GRANULOCYTES # BLD AUTO: 0.03 K/UL (ref 0–0.04)
IMM GRANULOCYTES NFR BLD AUTO: 0.4 % (ref 0–0.5)
LYMPHOCYTES # BLD AUTO: 1.1 K/UL (ref 1–4.8)
LYMPHOCYTES NFR BLD: 16.7 % (ref 18–48)
MAGNESIUM SERPL-MCNC: 1.5 MG/DL (ref 1.6–2.6)
MCH RBC QN AUTO: 28.8 PG (ref 27–31)
MCHC RBC AUTO-ENTMCNC: 30.4 G/DL (ref 32–36)
MCV RBC AUTO: 95 FL (ref 82–98)
MONOCYTES # BLD AUTO: 0.5 K/UL (ref 0.3–1)
MONOCYTES NFR BLD: 7.1 % (ref 4–15)
NEUTROPHILS # BLD AUTO: 4.3 K/UL (ref 1.8–7.7)
NEUTROPHILS NFR BLD: 64.2 % (ref 38–73)
NRBC BLD-RTO: 0 /100 WBC
PHOSPHATE SERPL-MCNC: 4.4 MG/DL (ref 2.7–4.5)
PLATELET # BLD AUTO: 211 K/UL (ref 150–350)
PMV BLD AUTO: 10.3 FL (ref 9.2–12.9)
POTASSIUM SERPL-SCNC: 4.4 MMOL/L (ref 3.5–5.1)
PROT SERPL-MCNC: 7.2 G/DL (ref 6–8.4)
RBC # BLD AUTO: 4.13 M/UL (ref 4–5.4)
SODIUM SERPL-SCNC: 143 MMOL/L (ref 136–145)
TACROLIMUS BLD-MCNC: 4.4 NG/ML (ref 5–15)
WBC # BLD AUTO: 6.75 K/UL (ref 3.9–12.7)

## 2021-01-13 PROCEDURE — 87799 DETECT AGENT NOS DNA QUANT: CPT

## 2021-01-13 PROCEDURE — 87799 DETECT AGENT NOS DNA QUANT: CPT | Mod: 91

## 2021-01-13 PROCEDURE — 84075 ASSAY ALKALINE PHOSPHATASE: CPT

## 2021-01-13 PROCEDURE — 80197 ASSAY OF TACROLIMUS: CPT

## 2021-01-13 PROCEDURE — 80069 RENAL FUNCTION PANEL: CPT

## 2021-01-13 PROCEDURE — 36415 COLL VENOUS BLD VENIPUNCTURE: CPT | Mod: PO

## 2021-01-13 PROCEDURE — 83735 ASSAY OF MAGNESIUM: CPT

## 2021-01-13 PROCEDURE — 84460 ALANINE AMINO (ALT) (SGPT): CPT

## 2021-01-13 PROCEDURE — 85025 COMPLETE CBC W/AUTO DIFF WBC: CPT

## 2021-01-14 DIAGNOSIS — E11.9 TYPE 2 DIABETES MELLITUS WITHOUT COMPLICATION: ICD-10-CM

## 2021-01-19 LAB
B19V DNA # SPEC NAA+PROBE: 824 COPIES/ML
SPECIMEN SOURCE: ABNORMAL

## 2021-01-20 ENCOUNTER — PATIENT MESSAGE (OUTPATIENT)
Dept: PRIMARY CARE CLINIC | Facility: CLINIC | Age: 61
End: 2021-01-20

## 2021-01-20 ENCOUNTER — OFFICE VISIT (OUTPATIENT)
Dept: TRANSPLANT | Facility: CLINIC | Age: 61
End: 2021-01-20
Payer: MEDICARE

## 2021-01-20 VITALS
OXYGEN SATURATION: 98 % | RESPIRATION RATE: 18 BRPM | TEMPERATURE: 98 F | BODY MASS INDEX: 34.91 KG/M2 | HEIGHT: 67 IN | DIASTOLIC BLOOD PRESSURE: 72 MMHG | SYSTOLIC BLOOD PRESSURE: 163 MMHG | HEART RATE: 78 BPM

## 2021-01-20 DIAGNOSIS — Z91.89 AT RISK FOR OPPORTUNISTIC INFECTIONS: ICD-10-CM

## 2021-01-20 DIAGNOSIS — B34.3 PARVOVIRUS B19 INFECTION: ICD-10-CM

## 2021-01-20 DIAGNOSIS — I10 ESSENTIAL HYPERTENSION: ICD-10-CM

## 2021-01-20 DIAGNOSIS — D61.9 APLASTIC ANEMIA: ICD-10-CM

## 2021-01-20 DIAGNOSIS — N18.2 CKD (CHRONIC KIDNEY DISEASE), STAGE II: ICD-10-CM

## 2021-01-20 DIAGNOSIS — Z94.0 STATUS POST LIVING-DONOR KIDNEY TRANSPLANTATION: Primary | ICD-10-CM

## 2021-01-20 DIAGNOSIS — Z79.60 LONG-TERM USE OF IMMUNOSUPPRESSANT MEDICATION: ICD-10-CM

## 2021-01-20 DIAGNOSIS — Z94.0 KIDNEY REPLACED BY TRANSPLANT: ICD-10-CM

## 2021-01-20 PROCEDURE — 1125F PR PAIN SEVERITY QUANTIFIED, PAIN PRESENT: ICD-10-PCS | Mod: S$GLB,,, | Performed by: INTERNAL MEDICINE

## 2021-01-20 PROCEDURE — 99214 OFFICE O/P EST MOD 30 MIN: CPT | Mod: GC,S$GLB,, | Performed by: INTERNAL MEDICINE

## 2021-01-20 PROCEDURE — 3008F PR BODY MASS INDEX (BMI) DOCUMENTED: ICD-10-PCS | Mod: CPTII,S$GLB,, | Performed by: INTERNAL MEDICINE

## 2021-01-20 PROCEDURE — 3008F BODY MASS INDEX DOCD: CPT | Mod: CPTII,S$GLB,, | Performed by: INTERNAL MEDICINE

## 2021-01-20 PROCEDURE — 3077F SYST BP >= 140 MM HG: CPT | Mod: CPTII,S$GLB,, | Performed by: INTERNAL MEDICINE

## 2021-01-20 PROCEDURE — 1125F AMNT PAIN NOTED PAIN PRSNT: CPT | Mod: S$GLB,,, | Performed by: INTERNAL MEDICINE

## 2021-01-20 PROCEDURE — 99999 PR PBB SHADOW E&M-EST. PATIENT-LVL IV: CPT | Mod: PBBFAC,,, | Performed by: INTERNAL MEDICINE

## 2021-01-20 PROCEDURE — 3078F DIAST BP <80 MM HG: CPT | Mod: CPTII,S$GLB,, | Performed by: INTERNAL MEDICINE

## 2021-01-20 PROCEDURE — 3077F PR MOST RECENT SYSTOLIC BLOOD PRESSURE >= 140 MM HG: ICD-10-PCS | Mod: CPTII,S$GLB,, | Performed by: INTERNAL MEDICINE

## 2021-01-20 PROCEDURE — 3078F PR MOST RECENT DIASTOLIC BLOOD PRESSURE < 80 MM HG: ICD-10-PCS | Mod: CPTII,S$GLB,, | Performed by: INTERNAL MEDICINE

## 2021-01-20 PROCEDURE — 99999 PR PBB SHADOW E&M-EST. PATIENT-LVL IV: ICD-10-PCS | Mod: PBBFAC,,, | Performed by: INTERNAL MEDICINE

## 2021-01-20 PROCEDURE — 99214 PR OFFICE/OUTPT VISIT, EST, LEVL IV, 30-39 MIN: ICD-10-PCS | Mod: GC,S$GLB,, | Performed by: INTERNAL MEDICINE

## 2021-01-20 PROCEDURE — 99499 UNLISTED E&M SERVICE: CPT | Mod: S$GLB,,, | Performed by: INTERNAL MEDICINE

## 2021-01-20 PROCEDURE — 3051F PR MOST RECENT HEMOGLOBIN A1C LEVEL 7.0 - < 8.0%: ICD-10-PCS | Mod: CPTII,S$GLB,, | Performed by: INTERNAL MEDICINE

## 2021-01-20 PROCEDURE — 3051F HG A1C>EQUAL 7.0%<8.0%: CPT | Mod: CPTII,S$GLB,, | Performed by: INTERNAL MEDICINE

## 2021-01-20 PROCEDURE — 99499 RISK ADDL DX/OHS AUDIT: ICD-10-PCS | Mod: S$GLB,,, | Performed by: INTERNAL MEDICINE

## 2021-01-20 RX ORDER — TACROLIMUS 1 MG/1
1 CAPSULE ORAL EVERY 12 HOURS
Qty: 60 CAPSULE | Refills: 11 | Status: SHIPPED | OUTPATIENT
Start: 2021-01-20 | End: 2022-02-14 | Stop reason: SDUPTHER

## 2021-01-25 ENCOUNTER — TELEPHONE (OUTPATIENT)
Dept: FAMILY MEDICINE | Facility: CLINIC | Age: 61
End: 2021-01-25

## 2021-02-03 ENCOUNTER — PATIENT MESSAGE (OUTPATIENT)
Dept: TRANSPLANT | Facility: CLINIC | Age: 61
End: 2021-02-03

## 2021-02-18 ENCOUNTER — OFFICE VISIT (OUTPATIENT)
Dept: PRIMARY CARE CLINIC | Facility: CLINIC | Age: 61
End: 2021-02-18
Payer: MEDICARE

## 2021-02-18 ENCOUNTER — LAB VISIT (OUTPATIENT)
Dept: LAB | Facility: HOSPITAL | Age: 61
End: 2021-02-18
Attending: NURSE PRACTITIONER
Payer: MEDICARE

## 2021-02-18 DIAGNOSIS — D84.9 IMMUNOSUPPRESSION: ICD-10-CM

## 2021-02-18 DIAGNOSIS — E11.8 CONTROLLED TYPE 2 DIABETES MELLITUS WITH COMPLICATION, WITH LONG-TERM CURRENT USE OF INSULIN: ICD-10-CM

## 2021-02-18 DIAGNOSIS — E11.8 CONTROLLED TYPE 2 DIABETES MELLITUS WITH COMPLICATION, WITH LONG-TERM CURRENT USE OF INSULIN: Primary | ICD-10-CM

## 2021-02-18 DIAGNOSIS — I12.9 HYPERTENSION ASSOCIATED WITH STAGE 3 CHRONIC KIDNEY DISEASE DUE TO TYPE 2 DIABETES MELLITUS: ICD-10-CM

## 2021-02-18 DIAGNOSIS — Z79.4 CONTROLLED TYPE 2 DIABETES MELLITUS WITH COMPLICATION, WITH LONG-TERM CURRENT USE OF INSULIN: Primary | ICD-10-CM

## 2021-02-18 DIAGNOSIS — E03.9 ACQUIRED HYPOTHYROIDISM: ICD-10-CM

## 2021-02-18 DIAGNOSIS — Z79.4 CONTROLLED TYPE 2 DIABETES MELLITUS WITH COMPLICATION, WITH LONG-TERM CURRENT USE OF INSULIN: ICD-10-CM

## 2021-02-18 DIAGNOSIS — N25.81 SECONDARY HYPERPARATHYROIDISM OF RENAL ORIGIN: ICD-10-CM

## 2021-02-18 DIAGNOSIS — N18.30 HYPERTENSION ASSOCIATED WITH STAGE 3 CHRONIC KIDNEY DISEASE DUE TO TYPE 2 DIABETES MELLITUS: ICD-10-CM

## 2021-02-18 DIAGNOSIS — E11.22 HYPERTENSION ASSOCIATED WITH STAGE 3 CHRONIC KIDNEY DISEASE DUE TO TYPE 2 DIABETES MELLITUS: ICD-10-CM

## 2021-02-18 LAB
T4 FREE SERPL-MCNC: 0.91 NG/DL (ref 0.71–1.51)
TSH SERPL DL<=0.005 MIU/L-ACNC: 1.25 UIU/ML (ref 0.34–5.6)

## 2021-02-18 PROCEDURE — 99499 RISK ADDL DX/OHS AUDIT: ICD-10-PCS | Mod: S$GLB,,, | Performed by: NURSE PRACTITIONER

## 2021-02-18 PROCEDURE — 99499 UNLISTED E&M SERVICE: CPT | Mod: S$GLB,,, | Performed by: NURSE PRACTITIONER

## 2021-02-18 PROCEDURE — 3008F BODY MASS INDEX DOCD: CPT | Mod: CPTII,S$GLB,, | Performed by: NURSE PRACTITIONER

## 2021-02-18 PROCEDURE — 3077F PR MOST RECENT SYSTOLIC BLOOD PRESSURE >= 140 MM HG: ICD-10-PCS | Mod: CPTII,S$GLB,, | Performed by: NURSE PRACTITIONER

## 2021-02-18 PROCEDURE — 83036 HEMOGLOBIN GLYCOSYLATED A1C: CPT

## 2021-02-18 PROCEDURE — 3052F PR MOST RECENT HEMOGLOBIN A1C LEVEL 8.0 - < 9.0%: ICD-10-PCS | Mod: CPTII,S$GLB,, | Performed by: NURSE PRACTITIONER

## 2021-02-18 PROCEDURE — 3052F HG A1C>EQUAL 8.0%<EQUAL 9.0%: CPT | Mod: CPTII,S$GLB,, | Performed by: NURSE PRACTITIONER

## 2021-02-18 PROCEDURE — 1125F PR PAIN SEVERITY QUANTIFIED, PAIN PRESENT: ICD-10-PCS | Mod: S$GLB,,, | Performed by: NURSE PRACTITIONER

## 2021-02-18 PROCEDURE — 84439 ASSAY OF FREE THYROXINE: CPT

## 2021-02-18 PROCEDURE — 3079F DIAST BP 80-89 MM HG: CPT | Mod: CPTII,S$GLB,, | Performed by: NURSE PRACTITIONER

## 2021-02-18 PROCEDURE — 3008F PR BODY MASS INDEX (BMI) DOCUMENTED: ICD-10-PCS | Mod: CPTII,S$GLB,, | Performed by: NURSE PRACTITIONER

## 2021-02-18 PROCEDURE — 99215 PR OFFICE/OUTPT VISIT, EST, LEVL V, 40-54 MIN: ICD-10-PCS | Mod: S$GLB,,, | Performed by: NURSE PRACTITIONER

## 2021-02-18 PROCEDURE — 99215 OFFICE O/P EST HI 40 MIN: CPT | Mod: S$GLB,,, | Performed by: NURSE PRACTITIONER

## 2021-02-18 PROCEDURE — 1125F AMNT PAIN NOTED PAIN PRSNT: CPT | Mod: S$GLB,,, | Performed by: NURSE PRACTITIONER

## 2021-02-18 PROCEDURE — 3077F SYST BP >= 140 MM HG: CPT | Mod: CPTII,S$GLB,, | Performed by: NURSE PRACTITIONER

## 2021-02-18 PROCEDURE — 84443 ASSAY THYROID STIM HORMONE: CPT

## 2021-02-18 PROCEDURE — 3079F PR MOST RECENT DIASTOLIC BLOOD PRESSURE 80-89 MM HG: ICD-10-PCS | Mod: CPTII,S$GLB,, | Performed by: NURSE PRACTITIONER

## 2021-02-18 PROCEDURE — 36415 COLL VENOUS BLD VENIPUNCTURE: CPT

## 2021-02-19 VITALS
TEMPERATURE: 97 F | WEIGHT: 226.63 LBS | BODY MASS INDEX: 35.57 KG/M2 | OXYGEN SATURATION: 98 % | HEIGHT: 67 IN | DIASTOLIC BLOOD PRESSURE: 80 MMHG | SYSTOLIC BLOOD PRESSURE: 150 MMHG | HEART RATE: 88 BPM

## 2021-02-19 LAB
ESTIMATED AVG GLUCOSE: 197 MG/DL (ref 68–131)
HBA1C MFR BLD: 8.5 % (ref 4.5–6.2)

## 2021-02-23 ENCOUNTER — TELEPHONE (OUTPATIENT)
Dept: PRIMARY CARE CLINIC | Facility: CLINIC | Age: 61
End: 2021-02-23

## 2021-03-16 ENCOUNTER — LAB VISIT (OUTPATIENT)
Dept: LAB | Facility: HOSPITAL | Age: 61
End: 2021-03-16
Attending: INTERNAL MEDICINE
Payer: MEDICARE

## 2021-03-16 DIAGNOSIS — Z94.0 KIDNEY REPLACED BY TRANSPLANT: ICD-10-CM

## 2021-03-16 DIAGNOSIS — B34.3 PARVOVIRUS B19 INFECTION: ICD-10-CM

## 2021-03-16 LAB
ALBUMIN SERPL BCP-MCNC: 3.6 G/DL (ref 3.5–5.2)
ANION GAP SERPL CALC-SCNC: 9 MMOL/L (ref 8–16)
BASOPHILS # BLD AUTO: 0.04 K/UL (ref 0–0.2)
BASOPHILS NFR BLD: 0.6 % (ref 0–1.9)
BUN SERPL-MCNC: 12 MG/DL (ref 8–23)
CALCIUM SERPL-MCNC: 9.5 MG/DL (ref 8.7–10.5)
CHLORIDE SERPL-SCNC: 105 MMOL/L (ref 95–110)
CO2 SERPL-SCNC: 28 MMOL/L (ref 23–29)
CREAT SERPL-MCNC: 1 MG/DL (ref 0.5–1.4)
DIFFERENTIAL METHOD: ABNORMAL
EOSINOPHIL # BLD AUTO: 0.6 K/UL (ref 0–0.5)
EOSINOPHIL NFR BLD: 9.7 % (ref 0–8)
ERYTHROCYTE [DISTWIDTH] IN BLOOD BY AUTOMATED COUNT: 13.9 % (ref 11.5–14.5)
EST. GFR  (AFRICAN AMERICAN): >60 ML/MIN/1.73 M^2
EST. GFR  (NON AFRICAN AMERICAN): >60 ML/MIN/1.73 M^2
GLUCOSE SERPL-MCNC: 161 MG/DL (ref 70–110)
HCT VFR BLD AUTO: 36.6 % (ref 37–48.5)
HGB BLD-MCNC: 11.7 G/DL (ref 12–16)
IMM GRANULOCYTES # BLD AUTO: 0.04 K/UL (ref 0–0.04)
IMM GRANULOCYTES NFR BLD AUTO: 0.6 % (ref 0–0.5)
LYMPHOCYTES # BLD AUTO: 1.4 K/UL (ref 1–4.8)
LYMPHOCYTES NFR BLD: 22.2 % (ref 18–48)
MAGNESIUM SERPL-MCNC: 1.5 MG/DL (ref 1.6–2.6)
MCH RBC QN AUTO: 28.3 PG (ref 27–31)
MCHC RBC AUTO-ENTMCNC: 32 G/DL (ref 32–36)
MCV RBC AUTO: 88 FL (ref 82–98)
MONOCYTES # BLD AUTO: 0.5 K/UL (ref 0.3–1)
MONOCYTES NFR BLD: 7.2 % (ref 4–15)
NEUTROPHILS # BLD AUTO: 3.7 K/UL (ref 1.8–7.7)
NEUTROPHILS NFR BLD: 59.7 % (ref 38–73)
NRBC BLD-RTO: 0 /100 WBC
PHOSPHATE SERPL-MCNC: 4.2 MG/DL (ref 2.7–4.5)
PLATELET # BLD AUTO: 199 K/UL (ref 150–350)
PMV BLD AUTO: 10 FL (ref 9.2–12.9)
POTASSIUM SERPL-SCNC: 4.5 MMOL/L (ref 3.5–5.1)
RBC # BLD AUTO: 4.14 M/UL (ref 4–5.4)
SODIUM SERPL-SCNC: 142 MMOL/L (ref 136–145)
WBC # BLD AUTO: 6.27 K/UL (ref 3.9–12.7)

## 2021-03-16 PROCEDURE — 85025 COMPLETE CBC W/AUTO DIFF WBC: CPT | Performed by: INTERNAL MEDICINE

## 2021-03-16 PROCEDURE — 83735 ASSAY OF MAGNESIUM: CPT | Performed by: INTERNAL MEDICINE

## 2021-03-16 PROCEDURE — 87799 DETECT AGENT NOS DNA QUANT: CPT | Performed by: INTERNAL MEDICINE

## 2021-03-16 PROCEDURE — 80069 RENAL FUNCTION PANEL: CPT | Performed by: INTERNAL MEDICINE

## 2021-03-16 PROCEDURE — 80197 ASSAY OF TACROLIMUS: CPT | Performed by: INTERNAL MEDICINE

## 2021-03-16 PROCEDURE — 36415 COLL VENOUS BLD VENIPUNCTURE: CPT | Mod: PO | Performed by: INTERNAL MEDICINE

## 2021-03-17 LAB — TACROLIMUS BLD-MCNC: 4.4 NG/ML (ref 5–15)

## 2021-03-19 LAB
B19V DNA # SPEC NAA+PROBE: 373 COPIES/ML
SPECIMEN SOURCE: ABNORMAL

## 2021-03-25 ENCOUNTER — PATIENT MESSAGE (OUTPATIENT)
Dept: TRANSPLANT | Facility: CLINIC | Age: 61
End: 2021-03-25

## 2021-03-30 DIAGNOSIS — E03.9 ACQUIRED HYPOTHYROIDISM: ICD-10-CM

## 2021-03-30 DIAGNOSIS — K21.9 GASTROESOPHAGEAL REFLUX DISEASE WITHOUT ESOPHAGITIS: ICD-10-CM

## 2021-03-30 RX ORDER — LEVOTHYROXINE SODIUM 75 UG/1
75 TABLET ORAL DAILY
Qty: 90 TABLET | Refills: 3 | Status: SHIPPED | OUTPATIENT
Start: 2021-03-30 | End: 2022-06-23

## 2021-03-30 RX ORDER — PANTOPRAZOLE SODIUM 20 MG/1
20 TABLET, DELAYED RELEASE ORAL DAILY
Qty: 90 TABLET | Refills: 3 | Status: SHIPPED | OUTPATIENT
Start: 2021-03-30 | End: 2021-05-27 | Stop reason: SDUPTHER

## 2021-04-05 ENCOUNTER — HOSPITAL ENCOUNTER (OUTPATIENT)
Dept: RADIOLOGY | Facility: HOSPITAL | Age: 61
Discharge: HOME | End: 2021-04-05
Attending: NEUROLOGICAL SURGERY
Payer: MEDICARE

## 2021-04-05 DIAGNOSIS — M54.12 RADICULOPATHY, CERVICAL: ICD-10-CM

## 2021-04-05 DIAGNOSIS — M54.12 RADICULOPATHY, CERVICAL: Primary | ICD-10-CM

## 2021-04-05 PROCEDURE — 72040 X-RAY EXAM NECK SPINE 2-3 VW: CPT | Mod: TC,PO

## 2021-04-13 ENCOUNTER — LAB VISIT (OUTPATIENT)
Dept: LAB | Facility: HOSPITAL | Age: 61
End: 2021-04-13
Attending: INTERNAL MEDICINE
Payer: MEDICARE

## 2021-04-13 DIAGNOSIS — Z94.0 KIDNEY REPLACED BY TRANSPLANT: ICD-10-CM

## 2021-04-13 DIAGNOSIS — B34.3 PARVOVIRUS B19 INFECTION: ICD-10-CM

## 2021-04-13 LAB
ALBUMIN SERPL BCP-MCNC: 3.7 G/DL (ref 3.5–5.2)
ANION GAP SERPL CALC-SCNC: 9 MMOL/L (ref 8–16)
BASOPHILS # BLD AUTO: 0.04 K/UL (ref 0–0.2)
BASOPHILS NFR BLD: 0.6 % (ref 0–1.9)
BUN SERPL-MCNC: 16 MG/DL (ref 8–23)
CALCIUM SERPL-MCNC: 9.6 MG/DL (ref 8.7–10.5)
CHLORIDE SERPL-SCNC: 104 MMOL/L (ref 95–110)
CO2 SERPL-SCNC: 30 MMOL/L (ref 23–29)
CREAT SERPL-MCNC: 1 MG/DL (ref 0.5–1.4)
DIFFERENTIAL METHOD: ABNORMAL
EOSINOPHIL # BLD AUTO: 1 K/UL (ref 0–0.5)
EOSINOPHIL NFR BLD: 13.8 % (ref 0–8)
ERYTHROCYTE [DISTWIDTH] IN BLOOD BY AUTOMATED COUNT: 14.7 % (ref 11.5–14.5)
EST. GFR  (AFRICAN AMERICAN): >60 ML/MIN/1.73 M^2
EST. GFR  (NON AFRICAN AMERICAN): >60 ML/MIN/1.73 M^2
GLUCOSE SERPL-MCNC: 223 MG/DL (ref 70–110)
HCT VFR BLD AUTO: 37.7 % (ref 37–48.5)
HGB BLD-MCNC: 11.6 G/DL (ref 12–16)
IMM GRANULOCYTES # BLD AUTO: 0.02 K/UL (ref 0–0.04)
IMM GRANULOCYTES NFR BLD AUTO: 0.3 % (ref 0–0.5)
LYMPHOCYTES # BLD AUTO: 1.2 K/UL (ref 1–4.8)
LYMPHOCYTES NFR BLD: 16.4 % (ref 18–48)
MAGNESIUM SERPL-MCNC: 1.4 MG/DL (ref 1.6–2.6)
MCH RBC QN AUTO: 28.4 PG (ref 27–31)
MCHC RBC AUTO-ENTMCNC: 30.8 G/DL (ref 32–36)
MCV RBC AUTO: 92 FL (ref 82–98)
MONOCYTES # BLD AUTO: 0.5 K/UL (ref 0.3–1)
MONOCYTES NFR BLD: 6.9 % (ref 4–15)
NEUTROPHILS # BLD AUTO: 4.4 K/UL (ref 1.8–7.7)
NEUTROPHILS NFR BLD: 62 % (ref 38–73)
NRBC BLD-RTO: 0 /100 WBC
PHOSPHATE SERPL-MCNC: 3.8 MG/DL (ref 2.7–4.5)
PLATELET # BLD AUTO: 208 K/UL (ref 150–450)
PMV BLD AUTO: 9.9 FL (ref 9.2–12.9)
POTASSIUM SERPL-SCNC: 5 MMOL/L (ref 3.5–5.1)
RBC # BLD AUTO: 4.09 M/UL (ref 4–5.4)
SODIUM SERPL-SCNC: 143 MMOL/L (ref 136–145)
WBC # BLD AUTO: 7.12 K/UL (ref 3.9–12.7)

## 2021-04-13 PROCEDURE — 83735 ASSAY OF MAGNESIUM: CPT | Performed by: INTERNAL MEDICINE

## 2021-04-13 PROCEDURE — 36415 COLL VENOUS BLD VENIPUNCTURE: CPT | Mod: PO | Performed by: INTERNAL MEDICINE

## 2021-04-13 PROCEDURE — 87799 DETECT AGENT NOS DNA QUANT: CPT | Performed by: INTERNAL MEDICINE

## 2021-04-13 PROCEDURE — 85025 COMPLETE CBC W/AUTO DIFF WBC: CPT | Performed by: INTERNAL MEDICINE

## 2021-04-13 PROCEDURE — 80069 RENAL FUNCTION PANEL: CPT | Performed by: INTERNAL MEDICINE

## 2021-04-13 PROCEDURE — 80197 ASSAY OF TACROLIMUS: CPT | Performed by: INTERNAL MEDICINE

## 2021-04-14 LAB — TACROLIMUS BLD-MCNC: 4.9 NG/ML (ref 5–15)

## 2021-04-17 LAB
B19V DNA # SPEC NAA+PROBE: 202 COPIES/ML
SPECIMEN SOURCE: ABNORMAL

## 2021-04-19 ENCOUNTER — TELEPHONE (OUTPATIENT)
Dept: TRANSPLANT | Facility: CLINIC | Age: 61
End: 2021-04-19

## 2021-04-25 ENCOUNTER — HOSPITAL ENCOUNTER (EMERGENCY)
Facility: HOSPITAL | Age: 61
Discharge: HOME | End: 2021-04-25
Attending: EMERGENCY MEDICINE
Payer: MEDICARE

## 2021-04-25 VITALS
SYSTOLIC BLOOD PRESSURE: 197 MMHG | OXYGEN SATURATION: 97 % | DIASTOLIC BLOOD PRESSURE: 86 MMHG | BODY MASS INDEX: 34.53 KG/M2 | RESPIRATION RATE: 20 BRPM | HEART RATE: 83 BPM | WEIGHT: 220 LBS | TEMPERATURE: 98 F | HEIGHT: 67 IN

## 2021-04-25 DIAGNOSIS — S80.01XA CONTUSION OF RIGHT KNEE, INITIAL ENCOUNTER: Primary | ICD-10-CM

## 2021-04-25 DIAGNOSIS — M25.561 RIGHT KNEE PAIN: ICD-10-CM

## 2021-04-25 PROCEDURE — 29505 APPLICATION LONG LEG SPLINT: CPT | Mod: RT

## 2021-04-25 PROCEDURE — 99283 EMERGENCY DEPT VISIT LOW MDM: CPT | Mod: 25

## 2021-04-27 ENCOUNTER — TELEPHONE (OUTPATIENT)
Dept: SPORTS MEDICINE | Facility: CLINIC | Age: 61
End: 2021-04-27

## 2021-04-28 ENCOUNTER — HOSPITAL ENCOUNTER (OUTPATIENT)
Dept: RADIOLOGY | Facility: HOSPITAL | Age: 61
Discharge: HOME | End: 2021-04-28
Attending: PHYSICIAN ASSISTANT
Payer: MEDICARE

## 2021-04-28 ENCOUNTER — PATIENT OUTREACH (OUTPATIENT)
Dept: ADMINISTRATIVE | Facility: OTHER | Age: 61
End: 2021-04-28

## 2021-04-28 ENCOUNTER — OFFICE VISIT (OUTPATIENT)
Dept: SPORTS MEDICINE | Facility: CLINIC | Age: 61
End: 2021-04-28
Payer: MEDICARE

## 2021-04-28 ENCOUNTER — PATIENT MESSAGE (OUTPATIENT)
Dept: ADMINISTRATIVE | Facility: OTHER | Age: 61
End: 2021-04-28

## 2021-04-28 VITALS
SYSTOLIC BLOOD PRESSURE: 152 MMHG | HEART RATE: 90 BPM | WEIGHT: 220 LBS | BODY MASS INDEX: 34.53 KG/M2 | DIASTOLIC BLOOD PRESSURE: 81 MMHG | HEIGHT: 67 IN

## 2021-04-28 DIAGNOSIS — M25.561 ACUTE PAIN OF RIGHT KNEE: Primary | ICD-10-CM

## 2021-04-28 DIAGNOSIS — Z96.652 S/P TOTAL KNEE REPLACEMENT, LEFT: ICD-10-CM

## 2021-04-28 DIAGNOSIS — Z12.31 ENCOUNTER FOR SCREENING MAMMOGRAM FOR MALIGNANT NEOPLASM OF BREAST: Primary | ICD-10-CM

## 2021-04-28 DIAGNOSIS — S80.01XA CONTUSION OF RIGHT KNEE, INITIAL ENCOUNTER: ICD-10-CM

## 2021-04-28 DIAGNOSIS — M25.461 SWELLING OF KNEE JOINT, RIGHT: ICD-10-CM

## 2021-04-28 DIAGNOSIS — M25.561 RIGHT KNEE PAIN, UNSPECIFIED CHRONICITY: ICD-10-CM

## 2021-04-28 PROCEDURE — 1125F PR PAIN SEVERITY QUANTIFIED, PAIN PRESENT: ICD-10-PCS | Mod: S$GLB,,, | Performed by: PHYSICIAN ASSISTANT

## 2021-04-28 PROCEDURE — 3008F BODY MASS INDEX DOCD: CPT | Mod: CPTII,S$GLB,, | Performed by: PHYSICIAN ASSISTANT

## 2021-04-28 PROCEDURE — 99214 OFFICE O/P EST MOD 30 MIN: CPT | Mod: S$GLB,,, | Performed by: PHYSICIAN ASSISTANT

## 2021-04-28 PROCEDURE — 99999 PR PBB SHADOW E&M-EST. PATIENT-LVL IV: CPT | Mod: PBBFAC,,, | Performed by: PHYSICIAN ASSISTANT

## 2021-04-28 PROCEDURE — 73564 X-RAY EXAM KNEE 4 OR MORE: CPT | Mod: TC,50

## 2021-04-28 PROCEDURE — 99999 PR PBB SHADOW E&M-EST. PATIENT-LVL IV: ICD-10-PCS | Mod: PBBFAC,,, | Performed by: PHYSICIAN ASSISTANT

## 2021-04-28 PROCEDURE — 73564 X-RAY EXAM KNEE 4 OR MORE: CPT | Mod: 26,50,, | Performed by: RADIOLOGY

## 2021-04-28 PROCEDURE — 1125F AMNT PAIN NOTED PAIN PRSNT: CPT | Mod: S$GLB,,, | Performed by: PHYSICIAN ASSISTANT

## 2021-04-28 PROCEDURE — 99214 PR OFFICE/OUTPT VISIT, EST, LEVL IV, 30-39 MIN: ICD-10-PCS | Mod: S$GLB,,, | Performed by: PHYSICIAN ASSISTANT

## 2021-04-28 PROCEDURE — 73564 XR KNEE ORTHO BILAT WITH FLEXION: ICD-10-PCS | Mod: 26,50,, | Performed by: RADIOLOGY

## 2021-04-28 PROCEDURE — 3008F PR BODY MASS INDEX (BMI) DOCUMENTED: ICD-10-PCS | Mod: CPTII,S$GLB,, | Performed by: PHYSICIAN ASSISTANT

## 2021-05-01 ENCOUNTER — HOSPITAL ENCOUNTER (OUTPATIENT)
Dept: RADIOLOGY | Facility: HOSPITAL | Age: 61
Discharge: HOME | End: 2021-05-01
Attending: PHYSICIAN ASSISTANT
Payer: MEDICARE

## 2021-05-01 DIAGNOSIS — M25.561 ACUTE PAIN OF RIGHT KNEE: ICD-10-CM

## 2021-05-01 DIAGNOSIS — M25.461 SWELLING OF KNEE JOINT, RIGHT: ICD-10-CM

## 2021-05-01 DIAGNOSIS — S80.01XA CONTUSION OF RIGHT KNEE, INITIAL ENCOUNTER: ICD-10-CM

## 2021-05-01 PROCEDURE — 73721 MRI KNEE WITHOUT CONTRAST RIGHT: ICD-10-PCS | Mod: 26,RT,, | Performed by: RADIOLOGY

## 2021-05-01 PROCEDURE — 73721 MRI JNT OF LWR EXTRE W/O DYE: CPT | Mod: TC,RT

## 2021-05-01 PROCEDURE — 73721 MRI JNT OF LWR EXTRE W/O DYE: CPT | Mod: 26,RT,, | Performed by: RADIOLOGY

## 2021-05-03 ENCOUNTER — OFFICE VISIT (OUTPATIENT)
Dept: SPORTS MEDICINE | Facility: CLINIC | Age: 61
End: 2021-05-03
Payer: MEDICARE

## 2021-05-03 VITALS
HEART RATE: 88 BPM | WEIGHT: 220 LBS | HEIGHT: 67 IN | DIASTOLIC BLOOD PRESSURE: 86 MMHG | SYSTOLIC BLOOD PRESSURE: 159 MMHG | BODY MASS INDEX: 34.53 KG/M2

## 2021-05-03 DIAGNOSIS — M23.91 INTERNAL DERANGEMENT OF RIGHT KNEE: ICD-10-CM

## 2021-05-03 DIAGNOSIS — M94.261 CHONDROMALACIA OF RIGHT KNEE: Primary | ICD-10-CM

## 2021-05-03 DIAGNOSIS — M17.11 PRIMARY OSTEOARTHRITIS OF RIGHT KNEE: ICD-10-CM

## 2021-05-03 PROCEDURE — 1125F PR PAIN SEVERITY QUANTIFIED, PAIN PRESENT: ICD-10-PCS | Mod: S$GLB,,, | Performed by: PHYSICIAN ASSISTANT

## 2021-05-03 PROCEDURE — 3008F PR BODY MASS INDEX (BMI) DOCUMENTED: ICD-10-PCS | Mod: CPTII,S$GLB,, | Performed by: PHYSICIAN ASSISTANT

## 2021-05-03 PROCEDURE — 3008F BODY MASS INDEX DOCD: CPT | Mod: CPTII,S$GLB,, | Performed by: PHYSICIAN ASSISTANT

## 2021-05-03 PROCEDURE — 1125F AMNT PAIN NOTED PAIN PRSNT: CPT | Mod: S$GLB,,, | Performed by: PHYSICIAN ASSISTANT

## 2021-05-03 PROCEDURE — 99214 OFFICE O/P EST MOD 30 MIN: CPT | Mod: S$GLB,,, | Performed by: PHYSICIAN ASSISTANT

## 2021-05-03 PROCEDURE — 99214 PR OFFICE/OUTPT VISIT, EST, LEVL IV, 30-39 MIN: ICD-10-PCS | Mod: S$GLB,,, | Performed by: PHYSICIAN ASSISTANT

## 2021-05-03 PROCEDURE — 99999 PR PBB SHADOW E&M-EST. PATIENT-LVL III: CPT | Mod: PBBFAC,,, | Performed by: PHYSICIAN ASSISTANT

## 2021-05-03 PROCEDURE — 99999 PR PBB SHADOW E&M-EST. PATIENT-LVL III: ICD-10-PCS | Mod: PBBFAC,,, | Performed by: PHYSICIAN ASSISTANT

## 2021-05-05 ENCOUNTER — TELEPHONE (OUTPATIENT)
Dept: PREADMISSION TESTING | Facility: HOSPITAL | Age: 61
End: 2021-05-05

## 2021-05-05 ENCOUNTER — PATIENT MESSAGE (OUTPATIENT)
Dept: TRANSPLANT | Facility: CLINIC | Age: 61
End: 2021-05-05

## 2021-05-05 ENCOUNTER — PATIENT MESSAGE (OUTPATIENT)
Dept: SPORTS MEDICINE | Facility: CLINIC | Age: 61
End: 2021-05-05

## 2021-05-05 ENCOUNTER — TELEPHONE (OUTPATIENT)
Dept: SPORTS MEDICINE | Facility: CLINIC | Age: 61
End: 2021-05-05

## 2021-05-05 DIAGNOSIS — M23.203 OLD TEAR OF MEDIAL MENISCUS OF RIGHT KNEE: Primary | ICD-10-CM

## 2021-05-05 DIAGNOSIS — Z01.818 PRE-OP TESTING: Primary | ICD-10-CM

## 2021-05-05 DIAGNOSIS — M94.261 CHONDROMALACIA OF KNEE, RIGHT: ICD-10-CM

## 2021-05-06 ENCOUNTER — TELEPHONE (OUTPATIENT)
Dept: TRANSPLANT | Facility: CLINIC | Age: 61
End: 2021-05-06

## 2021-05-07 ENCOUNTER — PATIENT MESSAGE (OUTPATIENT)
Dept: TRANSPLANT | Facility: CLINIC | Age: 61
End: 2021-05-07

## 2021-05-11 ENCOUNTER — PATIENT MESSAGE (OUTPATIENT)
Dept: SPORTS MEDICINE | Facility: CLINIC | Age: 61
End: 2021-05-11

## 2021-05-13 ENCOUNTER — OFFICE VISIT (OUTPATIENT)
Dept: PRIMARY CARE CLINIC | Facility: CLINIC | Age: 61
End: 2021-05-13
Payer: MEDICARE

## 2021-05-13 VITALS
TEMPERATURE: 99 F | WEIGHT: 219.38 LBS | DIASTOLIC BLOOD PRESSURE: 70 MMHG | OXYGEN SATURATION: 98 % | SYSTOLIC BLOOD PRESSURE: 135 MMHG | HEART RATE: 78 BPM | BODY MASS INDEX: 34.36 KG/M2

## 2021-05-13 DIAGNOSIS — Z79.4 CONTROLLED TYPE 2 DIABETES MELLITUS WITH COMPLICATION, WITH LONG-TERM CURRENT USE OF INSULIN: Primary | ICD-10-CM

## 2021-05-13 DIAGNOSIS — F33.0 MDD (MAJOR DEPRESSIVE DISORDER), RECURRENT EPISODE, MILD: ICD-10-CM

## 2021-05-13 DIAGNOSIS — M48.062 LUMBAR STENOSIS WITH NEUROGENIC CLAUDICATION: ICD-10-CM

## 2021-05-13 DIAGNOSIS — E11.8 CONTROLLED TYPE 2 DIABETES MELLITUS WITH COMPLICATION, WITH LONG-TERM CURRENT USE OF INSULIN: Primary | ICD-10-CM

## 2021-05-13 DIAGNOSIS — Z12.31 ENCOUNTER FOR SCREENING MAMMOGRAM FOR BREAST CANCER: ICD-10-CM

## 2021-05-13 DIAGNOSIS — E03.9 ACQUIRED HYPOTHYROIDISM: ICD-10-CM

## 2021-05-13 PROCEDURE — 99215 PR OFFICE/OUTPT VISIT, EST, LEVL V, 40-54 MIN: ICD-10-PCS | Mod: S$GLB,,, | Performed by: NURSE PRACTITIONER

## 2021-05-13 PROCEDURE — 3052F PR MOST RECENT HEMOGLOBIN A1C LEVEL 8.0 - < 9.0%: ICD-10-PCS | Mod: CPTII,S$GLB,, | Performed by: NURSE PRACTITIONER

## 2021-05-13 PROCEDURE — 99499 UNLISTED E&M SERVICE: CPT | Mod: S$GLB,,, | Performed by: NURSE PRACTITIONER

## 2021-05-13 PROCEDURE — 3008F BODY MASS INDEX DOCD: CPT | Mod: CPTII,S$GLB,, | Performed by: NURSE PRACTITIONER

## 2021-05-13 PROCEDURE — 99215 OFFICE O/P EST HI 40 MIN: CPT | Mod: S$GLB,,, | Performed by: NURSE PRACTITIONER

## 2021-05-13 PROCEDURE — 1126F PR PAIN SEVERITY QUANTIFIED, NO PAIN PRESENT: ICD-10-PCS | Mod: S$GLB,,, | Performed by: NURSE PRACTITIONER

## 2021-05-13 PROCEDURE — 3008F PR BODY MASS INDEX (BMI) DOCUMENTED: ICD-10-PCS | Mod: CPTII,S$GLB,, | Performed by: NURSE PRACTITIONER

## 2021-05-13 PROCEDURE — 3052F HG A1C>EQUAL 8.0%<EQUAL 9.0%: CPT | Mod: CPTII,S$GLB,, | Performed by: NURSE PRACTITIONER

## 2021-05-13 PROCEDURE — 99499 RISK ADDL DX/OHS AUDIT: ICD-10-PCS | Mod: S$GLB,,, | Performed by: NURSE PRACTITIONER

## 2021-05-13 PROCEDURE — 1126F AMNT PAIN NOTED NONE PRSNT: CPT | Mod: S$GLB,,, | Performed by: NURSE PRACTITIONER

## 2021-05-13 RX ORDER — HYDROCODONE BITARTRATE AND ACETAMINOPHEN 5; 325 MG/1; MG/1
1 TABLET ORAL EVERY 6 HOURS PRN
COMMUNITY
End: 2021-05-27

## 2021-05-18 ENCOUNTER — LAB VISIT (OUTPATIENT)
Dept: LAB | Facility: HOSPITAL | Age: 61
End: 2021-05-18
Attending: INTERNAL MEDICINE
Payer: MEDICARE

## 2021-05-18 ENCOUNTER — PATIENT MESSAGE (OUTPATIENT)
Dept: PRIMARY CARE CLINIC | Facility: CLINIC | Age: 61
End: 2021-05-18

## 2021-05-18 DIAGNOSIS — Z79.4 CONTROLLED TYPE 2 DIABETES MELLITUS WITH COMPLICATION, WITH LONG-TERM CURRENT USE OF INSULIN: ICD-10-CM

## 2021-05-18 DIAGNOSIS — E11.8 CONTROLLED TYPE 2 DIABETES MELLITUS WITH COMPLICATION, WITH LONG-TERM CURRENT USE OF INSULIN: ICD-10-CM

## 2021-05-18 DIAGNOSIS — E03.9 ACQUIRED HYPOTHYROIDISM: ICD-10-CM

## 2021-05-18 DIAGNOSIS — B34.3 PARVOVIRUS B19 INFECTION: ICD-10-CM

## 2021-05-18 DIAGNOSIS — Z94.0 KIDNEY REPLACED BY TRANSPLANT: ICD-10-CM

## 2021-05-18 LAB
ALBUMIN SERPL BCP-MCNC: 3.6 G/DL (ref 3.5–5.2)
ANION GAP SERPL CALC-SCNC: 6 MMOL/L (ref 8–16)
BASOPHILS # BLD AUTO: 0.06 K/UL (ref 0–0.2)
BASOPHILS NFR BLD: 0.9 % (ref 0–1.9)
BUN SERPL-MCNC: 18 MG/DL (ref 8–23)
CALCIUM SERPL-MCNC: 10.2 MG/DL (ref 8.7–10.5)
CHLORIDE SERPL-SCNC: 100 MMOL/L (ref 95–110)
CO2 SERPL-SCNC: 32 MMOL/L (ref 23–29)
CREAT SERPL-MCNC: 1 MG/DL (ref 0.5–1.4)
DIFFERENTIAL METHOD: ABNORMAL
EOSINOPHIL # BLD AUTO: 0.8 K/UL (ref 0–0.5)
EOSINOPHIL NFR BLD: 12 % (ref 0–8)
ERYTHROCYTE [DISTWIDTH] IN BLOOD BY AUTOMATED COUNT: 14.6 % (ref 11.5–14.5)
EST. GFR  (AFRICAN AMERICAN): >60 ML/MIN/1.73 M^2
EST. GFR  (NON AFRICAN AMERICAN): >60 ML/MIN/1.73 M^2
ESTIMATED AVG GLUCOSE: 180 MG/DL (ref 68–131)
GLUCOSE SERPL-MCNC: 123 MG/DL (ref 70–110)
HBA1C MFR BLD: 7.9 % (ref 4–5.6)
HCT VFR BLD AUTO: 38.2 % (ref 37–48.5)
HGB BLD-MCNC: 11.7 G/DL (ref 12–16)
IMM GRANULOCYTES # BLD AUTO: 0.04 K/UL (ref 0–0.04)
IMM GRANULOCYTES NFR BLD AUTO: 0.6 % (ref 0–0.5)
LYMPHOCYTES # BLD AUTO: 1.6 K/UL (ref 1–4.8)
LYMPHOCYTES NFR BLD: 22.8 % (ref 18–48)
MAGNESIUM SERPL-MCNC: 1.6 MG/DL (ref 1.6–2.6)
MCH RBC QN AUTO: 28.1 PG (ref 27–31)
MCHC RBC AUTO-ENTMCNC: 30.6 G/DL (ref 32–36)
MCV RBC AUTO: 92 FL (ref 82–98)
MONOCYTES # BLD AUTO: 0.5 K/UL (ref 0.3–1)
MONOCYTES NFR BLD: 6.5 % (ref 4–15)
NEUTROPHILS # BLD AUTO: 4 K/UL (ref 1.8–7.7)
NEUTROPHILS NFR BLD: 57.2 % (ref 38–73)
NRBC BLD-RTO: 0 /100 WBC
PHOSPHATE SERPL-MCNC: 4.1 MG/DL (ref 2.7–4.5)
PLATELET # BLD AUTO: 223 K/UL (ref 150–450)
PMV BLD AUTO: 10.1 FL (ref 9.2–12.9)
POTASSIUM SERPL-SCNC: 4.8 MMOL/L (ref 3.5–5.1)
RBC # BLD AUTO: 4.16 M/UL (ref 4–5.4)
SODIUM SERPL-SCNC: 138 MMOL/L (ref 136–145)
T4 FREE SERPL-MCNC: 0.96 NG/DL (ref 0.71–1.51)
TSH SERPL DL<=0.005 MIU/L-ACNC: 2.45 UIU/ML (ref 0.4–4)
WBC # BLD AUTO: 6.9 K/UL (ref 3.9–12.7)

## 2021-05-18 PROCEDURE — 80069 RENAL FUNCTION PANEL: CPT | Performed by: INTERNAL MEDICINE

## 2021-05-18 PROCEDURE — 85025 COMPLETE CBC W/AUTO DIFF WBC: CPT | Performed by: INTERNAL MEDICINE

## 2021-05-18 PROCEDURE — 80197 ASSAY OF TACROLIMUS: CPT | Performed by: INTERNAL MEDICINE

## 2021-05-18 PROCEDURE — 84439 ASSAY OF FREE THYROXINE: CPT | Performed by: NURSE PRACTITIONER

## 2021-05-18 PROCEDURE — 83036 HEMOGLOBIN GLYCOSYLATED A1C: CPT | Performed by: NURSE PRACTITIONER

## 2021-05-18 PROCEDURE — 84443 ASSAY THYROID STIM HORMONE: CPT | Performed by: NURSE PRACTITIONER

## 2021-05-18 PROCEDURE — 87799 DETECT AGENT NOS DNA QUANT: CPT | Performed by: INTERNAL MEDICINE

## 2021-05-18 PROCEDURE — 83735 ASSAY OF MAGNESIUM: CPT | Performed by: INTERNAL MEDICINE

## 2021-05-18 RX ORDER — INSULIN ASPART 100 [IU]/ML
30 INJECTION, SOLUTION INTRAVENOUS; SUBCUTANEOUS
Qty: 1 SYRINGE | Refills: 11 | Status: SHIPPED | OUTPATIENT
Start: 2021-05-18 | End: 2021-05-27

## 2021-05-19 LAB
TACROLIMUS BLD-MCNC: 5.5 NG/ML (ref 5–15)
TACROLIMUS, NORMALIZED: 5 NG/ML (ref 5–15)

## 2021-05-22 ENCOUNTER — PATIENT MESSAGE (OUTPATIENT)
Dept: TRANSPLANT | Facility: CLINIC | Age: 61
End: 2021-05-22

## 2021-05-22 LAB
B19V DNA # SPEC NAA+PROBE: 239 COPIES/ML
SPECIMEN SOURCE: ABNORMAL

## 2021-05-25 ENCOUNTER — OFFICE VISIT (OUTPATIENT)
Dept: GASTROENTEROLOGY | Facility: CLINIC | Age: 61
End: 2021-05-25
Payer: MEDICARE

## 2021-05-25 VITALS
WEIGHT: 216.69 LBS | HEART RATE: 87 BPM | DIASTOLIC BLOOD PRESSURE: 66 MMHG | SYSTOLIC BLOOD PRESSURE: 153 MMHG | BODY MASS INDEX: 33.94 KG/M2

## 2021-05-25 DIAGNOSIS — R93.2 ABNORMAL CT OF LIVER: Primary | ICD-10-CM

## 2021-05-25 DIAGNOSIS — K21.9 GASTROESOPHAGEAL REFLUX DISEASE WITHOUT ESOPHAGITIS: ICD-10-CM

## 2021-05-25 DIAGNOSIS — R93.5 ABNORMAL FINDINGS ON DIAGNOSTIC IMAGING OF OTHER ABDOMINAL REGIONS, INCLUDING RETROPERITONEUM: ICD-10-CM

## 2021-05-25 DIAGNOSIS — Z01.818 PRE-OP TESTING: ICD-10-CM

## 2021-05-25 PROCEDURE — 99214 PR OFFICE/OUTPT VISIT, EST, LEVL IV, 30-39 MIN: ICD-10-PCS | Mod: S$GLB,,, | Performed by: INTERNAL MEDICINE

## 2021-05-25 PROCEDURE — 99214 OFFICE O/P EST MOD 30 MIN: CPT | Mod: S$GLB,,, | Performed by: INTERNAL MEDICINE

## 2021-05-25 PROCEDURE — 99999 PR PBB SHADOW E&M-EST. PATIENT-LVL III: ICD-10-PCS | Mod: PBBFAC,,, | Performed by: INTERNAL MEDICINE

## 2021-05-25 PROCEDURE — 1125F PR PAIN SEVERITY QUANTIFIED, PAIN PRESENT: ICD-10-PCS | Mod: S$GLB,,, | Performed by: INTERNAL MEDICINE

## 2021-05-25 PROCEDURE — 3008F PR BODY MASS INDEX (BMI) DOCUMENTED: ICD-10-PCS | Mod: CPTII,S$GLB,, | Performed by: INTERNAL MEDICINE

## 2021-05-25 PROCEDURE — 3008F BODY MASS INDEX DOCD: CPT | Mod: CPTII,S$GLB,, | Performed by: INTERNAL MEDICINE

## 2021-05-25 PROCEDURE — 99999 PR PBB SHADOW E&M-EST. PATIENT-LVL III: CPT | Mod: PBBFAC,,, | Performed by: INTERNAL MEDICINE

## 2021-05-25 PROCEDURE — 1125F AMNT PAIN NOTED PAIN PRSNT: CPT | Mod: S$GLB,,, | Performed by: INTERNAL MEDICINE

## 2021-05-27 ENCOUNTER — OFFICE VISIT (OUTPATIENT)
Dept: PRIMARY CARE CLINIC | Facility: CLINIC | Age: 61
End: 2021-05-27
Payer: MEDICARE

## 2021-05-27 DIAGNOSIS — E11.8 CONTROLLED TYPE 2 DIABETES MELLITUS WITH COMPLICATION, WITH LONG-TERM CURRENT USE OF INSULIN: Primary | ICD-10-CM

## 2021-05-27 DIAGNOSIS — Z79.4 CONTROLLED TYPE 2 DIABETES MELLITUS WITH COMPLICATION, WITH LONG-TERM CURRENT USE OF INSULIN: Primary | ICD-10-CM

## 2021-05-27 PROCEDURE — 99499 UNLISTED E&M SERVICE: CPT | Mod: 95,,, | Performed by: NURSE PRACTITIONER

## 2021-05-27 PROCEDURE — 3051F PR MOST RECENT HEMOGLOBIN A1C LEVEL 7.0 - < 8.0%: ICD-10-PCS | Mod: CPTII,,, | Performed by: NURSE PRACTITIONER

## 2021-05-27 PROCEDURE — 99499 NO LOS: ICD-10-PCS | Mod: 95,,, | Performed by: NURSE PRACTITIONER

## 2021-05-27 PROCEDURE — 3051F HG A1C>EQUAL 7.0%<8.0%: CPT | Mod: CPTII,,, | Performed by: NURSE PRACTITIONER

## 2021-05-27 RX ORDER — PANTOPRAZOLE SODIUM 40 MG/1
40 TABLET, DELAYED RELEASE ORAL
Qty: 60 TABLET | Refills: 3 | Status: ON HOLD | OUTPATIENT
Start: 2021-05-27 | End: 2021-06-10 | Stop reason: SDUPTHER

## 2021-05-27 RX ORDER — SUCRALFATE 1 G/1
1 TABLET ORAL
Qty: 30 TABLET | Refills: 0 | Status: SHIPPED | OUTPATIENT
Start: 2021-05-27 | End: 2021-06-06

## 2021-06-01 ENCOUNTER — PATIENT OUTREACH (OUTPATIENT)
Dept: ADMINISTRATIVE | Facility: OTHER | Age: 61
End: 2021-06-01

## 2021-06-02 ENCOUNTER — HOSPITAL ENCOUNTER (OUTPATIENT)
Facility: HOSPITAL | Age: 61
Discharge: HOME | End: 2021-06-03
Attending: EMERGENCY MEDICINE | Admitting: INTERNAL MEDICINE
Payer: MEDICARE

## 2021-06-02 DIAGNOSIS — R05.9 COUGH: ICD-10-CM

## 2021-06-02 DIAGNOSIS — R42 DIZZINESS: ICD-10-CM

## 2021-06-02 DIAGNOSIS — R42 VERTIGO: Primary | ICD-10-CM

## 2021-06-02 DIAGNOSIS — R07.9 CHEST PAIN: ICD-10-CM

## 2021-06-02 LAB
ALBUMIN SERPL BCP-MCNC: 3.8 G/DL (ref 3.5–5.2)
ALP SERPL-CCNC: 154 U/L (ref 55–135)
ALT SERPL W/O P-5'-P-CCNC: 43 U/L (ref 10–44)
AMPHET+METHAMPHET UR QL: NEGATIVE
ANION GAP SERPL CALC-SCNC: 11 MMOL/L (ref 8–16)
AST SERPL-CCNC: 41 U/L (ref 10–40)
BACTERIA #/AREA URNS HPF: NORMAL /HPF
BARBITURATES UR QL SCN>200 NG/ML: NEGATIVE
BASOPHILS # BLD AUTO: 0.04 K/UL (ref 0–0.2)
BASOPHILS NFR BLD: 0.4 % (ref 0–1.9)
BENZODIAZ UR QL SCN>200 NG/ML: NEGATIVE
BILIRUB SERPL-MCNC: 0.4 MG/DL (ref 0.1–1)
BILIRUB UR QL STRIP: NEGATIVE
BUN SERPL-MCNC: 19 MG/DL (ref 8–23)
BZE UR QL SCN: NEGATIVE
CALCIUM SERPL-MCNC: 9.9 MG/DL (ref 8.7–10.5)
CANNABINOIDS UR QL SCN: NEGATIVE
CHLORIDE SERPL-SCNC: 102 MMOL/L (ref 95–110)
CLARITY UR: CLEAR
CO2 SERPL-SCNC: 26 MMOL/L (ref 23–29)
COLOR UR: YELLOW
CREAT SERPL-MCNC: 0.9 MG/DL (ref 0.5–1.4)
CREAT UR-MCNC: 51.2 MG/DL (ref 15–325)
DIFFERENTIAL METHOD: ABNORMAL
EOSINOPHIL # BLD AUTO: 0.8 K/UL (ref 0–0.5)
EOSINOPHIL NFR BLD: 8.3 % (ref 0–8)
ERYTHROCYTE [DISTWIDTH] IN BLOOD BY AUTOMATED COUNT: 14.1 % (ref 11.5–14.5)
EST. GFR  (AFRICAN AMERICAN): >60 ML/MIN/1.73 M^2
EST. GFR  (NON AFRICAN AMERICAN): >60 ML/MIN/1.73 M^2
GLUCOSE SERPL-MCNC: 221 MG/DL (ref 70–110)
GLUCOSE UR QL STRIP: NEGATIVE
HCT VFR BLD AUTO: 36.3 % (ref 37–48.5)
HGB BLD-MCNC: 11.4 G/DL (ref 12–16)
HGB UR QL STRIP: ABNORMAL
IMM GRANULOCYTES # BLD AUTO: 0.06 K/UL (ref 0–0.04)
IMM GRANULOCYTES NFR BLD AUTO: 0.6 % (ref 0–0.5)
KETONES UR QL STRIP: NEGATIVE
LEUKOCYTE ESTERASE UR QL STRIP: ABNORMAL
LIPASE SERPL-CCNC: 81 U/L (ref 4–60)
LYMPHOCYTES # BLD AUTO: 1.1 K/UL (ref 1–4.8)
LYMPHOCYTES NFR BLD: 11.2 % (ref 18–48)
MAGNESIUM SERPL-MCNC: 1.4 MG/DL (ref 1.6–2.6)
MCH RBC QN AUTO: 28.3 PG (ref 27–31)
MCHC RBC AUTO-ENTMCNC: 31.4 G/DL (ref 32–36)
MCV RBC AUTO: 90 FL (ref 82–98)
METHADONE UR QL SCN>300 NG/ML: NEGATIVE
MICROSCOPIC COMMENT: NORMAL
MONOCYTES # BLD AUTO: 0.6 K/UL (ref 0.3–1)
MONOCYTES NFR BLD: 5.7 % (ref 4–15)
NEUTROPHILS # BLD AUTO: 7.5 K/UL (ref 1.8–7.7)
NEUTROPHILS NFR BLD: 73.8 % (ref 38–73)
NITRITE UR QL STRIP: NEGATIVE
NRBC BLD-RTO: 0 /100 WBC
OPIATES UR QL SCN: NEGATIVE
PCP UR QL SCN>25 NG/ML: NEGATIVE
PH UR STRIP: 6 [PH] (ref 5–8)
PLATELET # BLD AUTO: 205 K/UL (ref 150–450)
PMV BLD AUTO: 9.3 FL (ref 9.2–12.9)
POCT GLUCOSE: 124 MG/DL (ref 70–110)
POCT GLUCOSE: 180 MG/DL (ref 70–110)
POCT GLUCOSE: 210 MG/DL (ref 70–110)
POTASSIUM SERPL-SCNC: 4.2 MMOL/L (ref 3.5–5.1)
PROT SERPL-MCNC: 7.2 G/DL (ref 6–8.4)
PROT UR QL STRIP: NEGATIVE
RBC # BLD AUTO: 4.03 M/UL (ref 4–5.4)
RBC #/AREA URNS HPF: 1 /HPF (ref 0–4)
SARS-COV-2 RDRP RESP QL NAA+PROBE: NEGATIVE
SODIUM SERPL-SCNC: 139 MMOL/L (ref 136–145)
SP GR UR STRIP: 1.02 (ref 1–1.03)
SQUAMOUS #/AREA URNS HPF: 1 /HPF
TOXICOLOGY INFORMATION: NORMAL
TSH SERPL DL<=0.005 MIU/L-ACNC: 1.33 UIU/ML (ref 0.4–4)
URN SPEC COLLECT METH UR: ABNORMAL
UROBILINOGEN UR STRIP-ACNC: NEGATIVE EU/DL
WBC # BLD AUTO: 10.16 K/UL (ref 3.9–12.7)
WBC #/AREA URNS HPF: 3 /HPF (ref 0–5)

## 2021-06-02 PROCEDURE — 81000 URINALYSIS NONAUTO W/SCOPE: CPT | Mod: 59 | Performed by: EMERGENCY MEDICINE

## 2021-06-02 PROCEDURE — A4216 STERILE WATER/SALINE, 10 ML: HCPCS | Performed by: INTERNAL MEDICINE

## 2021-06-02 PROCEDURE — U0002 COVID-19 LAB TEST NON-CDC: HCPCS | Performed by: EMERGENCY MEDICINE

## 2021-06-02 PROCEDURE — 93010 ELECTROCARDIOGRAM REPORT: CPT | Mod: ,,, | Performed by: INTERNAL MEDICINE

## 2021-06-02 PROCEDURE — 99285 EMERGENCY DEPT VISIT HI MDM: CPT | Mod: 25

## 2021-06-02 PROCEDURE — 80053 COMPREHEN METABOLIC PANEL: CPT | Performed by: EMERGENCY MEDICINE

## 2021-06-02 PROCEDURE — 94760 N-INVAS EAR/PLS OXIMETRY 1: CPT

## 2021-06-02 PROCEDURE — 96361 HYDRATE IV INFUSION ADD-ON: CPT

## 2021-06-02 PROCEDURE — 84443 ASSAY THYROID STIM HORMONE: CPT | Performed by: EMERGENCY MEDICINE

## 2021-06-02 PROCEDURE — 82962 GLUCOSE BLOOD TEST: CPT

## 2021-06-02 PROCEDURE — 94761 N-INVAS EAR/PLS OXIMETRY MLT: CPT

## 2021-06-02 PROCEDURE — 36415 COLL VENOUS BLD VENIPUNCTURE: CPT | Performed by: EMERGENCY MEDICINE

## 2021-06-02 PROCEDURE — 83690 ASSAY OF LIPASE: CPT | Performed by: EMERGENCY MEDICINE

## 2021-06-02 PROCEDURE — 96374 THER/PROPH/DIAG INJ IV PUSH: CPT

## 2021-06-02 PROCEDURE — G0378 HOSPITAL OBSERVATION PER HR: HCPCS

## 2021-06-02 PROCEDURE — 93010 EKG 12-LEAD: ICD-10-PCS | Mod: ,,, | Performed by: INTERNAL MEDICINE

## 2021-06-02 PROCEDURE — 83735 ASSAY OF MAGNESIUM: CPT | Performed by: EMERGENCY MEDICINE

## 2021-06-02 PROCEDURE — 93005 ELECTROCARDIOGRAM TRACING: CPT

## 2021-06-02 PROCEDURE — 85025 COMPLETE CBC W/AUTO DIFF WBC: CPT | Performed by: EMERGENCY MEDICINE

## 2021-06-02 PROCEDURE — 80197 ASSAY OF TACROLIMUS: CPT | Performed by: EMERGENCY MEDICINE

## 2021-06-02 PROCEDURE — 63600175 PHARM REV CODE 636 W HCPCS: Performed by: EMERGENCY MEDICINE

## 2021-06-02 PROCEDURE — 80307 DRUG TEST PRSMV CHEM ANLYZR: CPT | Performed by: EMERGENCY MEDICINE

## 2021-06-02 PROCEDURE — 25000003 PHARM REV CODE 250: Performed by: EMERGENCY MEDICINE

## 2021-06-02 PROCEDURE — 25000003 PHARM REV CODE 250: Performed by: INTERNAL MEDICINE

## 2021-06-02 RX ORDER — TACROLIMUS 0.5 MG/1
1 CAPSULE ORAL 2 TIMES DAILY
Status: DISCONTINUED | OUTPATIENT
Start: 2021-06-02 | End: 2021-06-03 | Stop reason: HOSPADM

## 2021-06-02 RX ORDER — ACETAMINOPHEN 325 MG/1
650 TABLET ORAL EVERY 8 HOURS PRN
Status: DISCONTINUED | OUTPATIENT
Start: 2021-06-02 | End: 2021-06-03 | Stop reason: HOSPADM

## 2021-06-02 RX ORDER — LANOLIN ALCOHOL/MO/W.PET/CERES
800 CREAM (GRAM) TOPICAL
Status: DISCONTINUED | OUTPATIENT
Start: 2021-06-02 | End: 2021-06-03 | Stop reason: HOSPADM

## 2021-06-02 RX ORDER — FERROUS SULFATE, DRIED 160(50) MG
1 TABLET, EXTENDED RELEASE ORAL DAILY
Status: DISCONTINUED | OUTPATIENT
Start: 2021-06-02 | End: 2021-06-03 | Stop reason: HOSPADM

## 2021-06-02 RX ORDER — GLUCAGON 1 MG
1 KIT INJECTION
Status: DISCONTINUED | OUTPATIENT
Start: 2021-06-02 | End: 2021-06-03 | Stop reason: HOSPADM

## 2021-06-02 RX ORDER — ENOXAPARIN SODIUM 100 MG/ML
40 INJECTION SUBCUTANEOUS EVERY 24 HOURS
Status: DISCONTINUED | OUTPATIENT
Start: 2021-06-02 | End: 2021-06-03 | Stop reason: HOSPADM

## 2021-06-02 RX ORDER — SODIUM,POTASSIUM PHOSPHATES 280-250MG
2 POWDER IN PACKET (EA) ORAL
Status: DISCONTINUED | OUTPATIENT
Start: 2021-06-02 | End: 2021-06-03 | Stop reason: HOSPADM

## 2021-06-02 RX ORDER — SUCRALFATE 1 G/1
1 TABLET ORAL
Status: DISCONTINUED | OUTPATIENT
Start: 2021-06-02 | End: 2021-06-03 | Stop reason: HOSPADM

## 2021-06-02 RX ORDER — IBUPROFEN 200 MG
24 TABLET ORAL
Status: DISCONTINUED | OUTPATIENT
Start: 2021-06-02 | End: 2021-06-03 | Stop reason: HOSPADM

## 2021-06-02 RX ORDER — INSULIN ASPART 100 [IU]/ML
0-5 INJECTION, SOLUTION INTRAVENOUS; SUBCUTANEOUS
Status: DISCONTINUED | OUTPATIENT
Start: 2021-06-02 | End: 2021-06-03 | Stop reason: HOSPADM

## 2021-06-02 RX ORDER — LANOLIN ALCOHOL/MO/W.PET/CERES
400 CREAM (GRAM) TOPICAL ONCE
Status: COMPLETED | OUTPATIENT
Start: 2021-06-02 | End: 2021-06-02

## 2021-06-02 RX ORDER — IBUPROFEN 200 MG
16 TABLET ORAL
Status: DISCONTINUED | OUTPATIENT
Start: 2021-06-02 | End: 2021-06-03 | Stop reason: HOSPADM

## 2021-06-02 RX ORDER — LORAZEPAM 0.5 MG/1
1 TABLET ORAL NIGHTLY
Status: DISCONTINUED | OUTPATIENT
Start: 2021-06-02 | End: 2021-06-03 | Stop reason: HOSPADM

## 2021-06-02 RX ORDER — POLYETHYLENE GLYCOL 3350 17 G/17G
17 POWDER, FOR SOLUTION ORAL DAILY PRN
Status: DISCONTINUED | OUTPATIENT
Start: 2021-06-02 | End: 2021-06-03 | Stop reason: HOSPADM

## 2021-06-02 RX ORDER — ONDANSETRON 2 MG/ML
4 INJECTION INTRAMUSCULAR; INTRAVENOUS EVERY 8 HOURS PRN
Status: DISCONTINUED | OUTPATIENT
Start: 2021-06-02 | End: 2021-06-03 | Stop reason: HOSPADM

## 2021-06-02 RX ORDER — MECLIZINE HYDROCHLORIDE 25 MG/1
25 TABLET ORAL
Status: COMPLETED | OUTPATIENT
Start: 2021-06-02 | End: 2021-06-02

## 2021-06-02 RX ORDER — VILAZODONE HYDROCHLORIDE 20 MG/1
40 TABLET ORAL DAILY
Status: DISCONTINUED | OUTPATIENT
Start: 2021-06-02 | End: 2021-06-03

## 2021-06-02 RX ORDER — LANOLIN ALCOHOL/MO/W.PET/CERES
400 CREAM (GRAM) TOPICAL DAILY
Status: DISCONTINUED | OUTPATIENT
Start: 2021-06-02 | End: 2021-06-03 | Stop reason: HOSPADM

## 2021-06-02 RX ORDER — PROCHLORPERAZINE EDISYLATE 5 MG/ML
10 INJECTION INTRAMUSCULAR; INTRAVENOUS
Status: COMPLETED | OUTPATIENT
Start: 2021-06-02 | End: 2021-06-02

## 2021-06-02 RX ORDER — ROSUVASTATIN CALCIUM 5 MG/1
10 TABLET, COATED ORAL NIGHTLY
Status: DISCONTINUED | OUTPATIENT
Start: 2021-06-02 | End: 2021-06-03 | Stop reason: HOSPADM

## 2021-06-02 RX ORDER — TALC
6 POWDER (GRAM) TOPICAL NIGHTLY PRN
Status: DISCONTINUED | OUTPATIENT
Start: 2021-06-02 | End: 2021-06-03 | Stop reason: HOSPADM

## 2021-06-02 RX ORDER — PANTOPRAZOLE SODIUM 40 MG/1
40 TABLET, DELAYED RELEASE ORAL
Status: DISCONTINUED | OUTPATIENT
Start: 2021-06-02 | End: 2021-06-03 | Stop reason: HOSPADM

## 2021-06-02 RX ORDER — SODIUM CHLORIDE 0.9 % (FLUSH) 0.9 %
10 SYRINGE (ML) INJECTION EVERY 8 HOURS
Status: DISCONTINUED | OUTPATIENT
Start: 2021-06-02 | End: 2021-06-03 | Stop reason: HOSPADM

## 2021-06-02 RX ORDER — INSULIN ASPART 100 [IU]/ML
INJECTION, SOLUTION INTRAVENOUS; SUBCUTANEOUS
COMMUNITY
End: 2021-08-10 | Stop reason: SDUPTHER

## 2021-06-02 RX ORDER — TIZANIDINE 4 MG/1
4 TABLET ORAL NIGHTLY
Status: DISCONTINUED | OUTPATIENT
Start: 2021-06-02 | End: 2021-06-03 | Stop reason: HOSPADM

## 2021-06-02 RX ADMIN — SODIUM CHLORIDE 500 ML: 0.9 INJECTION, SOLUTION INTRAVENOUS at 12:06

## 2021-06-02 RX ADMIN — ROSUVASTATIN CALCIUM 10 MG: 5 TABLET, COATED ORAL at 10:06

## 2021-06-02 RX ADMIN — MECLIZINE HYDROCHLORIDE 25 MG: 25 TABLET ORAL at 09:06

## 2021-06-02 RX ADMIN — Medication 400 MG: at 11:06

## 2021-06-02 RX ADMIN — SUCRALFATE 1 G: 1 TABLET ORAL at 04:06

## 2021-06-02 RX ADMIN — Medication 1 TABLET: at 04:06

## 2021-06-02 RX ADMIN — PANTOPRAZOLE SODIUM 40 MG: 40 TABLET, DELAYED RELEASE ORAL at 04:06

## 2021-06-02 RX ADMIN — LORAZEPAM 1 MG: 0.5 TABLET ORAL at 10:06

## 2021-06-02 RX ADMIN — TIZANIDINE 4 MG: 4 TABLET ORAL at 10:06

## 2021-06-02 RX ADMIN — THERA TABS 1 TABLET: TAB at 04:06

## 2021-06-02 RX ADMIN — VILAZODONE HYDROCHLORIDE 40 MG: 20 TABLET ORAL at 05:06

## 2021-06-02 RX ADMIN — TACROLIMUS 1 MG: 0.5 CAPSULE ORAL at 05:06

## 2021-06-02 RX ADMIN — MAGNESIUM OXIDE TAB 400 MG (241.3 MG ELEMENTAL MG) 400 MG: 400 (241.3 MG) TAB at 04:06

## 2021-06-02 RX ADMIN — Medication 10 ML: at 10:06

## 2021-06-02 RX ADMIN — PROCHLORPERAZINE EDISYLATE 10 MG: 5 INJECTION INTRAMUSCULAR; INTRAVENOUS at 11:06

## 2021-06-03 ENCOUNTER — PATIENT MESSAGE (OUTPATIENT)
Dept: PRIMARY CARE CLINIC | Facility: CLINIC | Age: 61
End: 2021-06-03

## 2021-06-03 VITALS
BODY MASS INDEX: 32.96 KG/M2 | DIASTOLIC BLOOD PRESSURE: 67 MMHG | SYSTOLIC BLOOD PRESSURE: 147 MMHG | HEART RATE: 77 BPM | TEMPERATURE: 97 F | OXYGEN SATURATION: 93 % | HEIGHT: 67 IN | WEIGHT: 210 LBS | RESPIRATION RATE: 18 BRPM

## 2021-06-03 LAB
ALBUMIN SERPL BCP-MCNC: 3.8 G/DL (ref 3.5–5.2)
ALP SERPL-CCNC: 99 U/L (ref 55–135)
ALT SERPL W/O P-5'-P-CCNC: 42 U/L (ref 10–44)
ANION GAP SERPL CALC-SCNC: 10 MMOL/L (ref 8–16)
AORTIC ROOT ANNULUS: 2.84 CM
AORTIC VALVE CUSP SEPERATION: 1.2 CM
AST SERPL-CCNC: 39 U/L (ref 10–40)
AV INDEX (PROSTH): 0.68
AV MEAN GRADIENT: 16 MMHG
AV PEAK GRADIENT: 29 MMHG
AV VALVE AREA: 2.33 CM2
AV VELOCITY RATIO: 0.55
BASOPHILS # BLD AUTO: 0.05 K/UL (ref 0–0.2)
BASOPHILS NFR BLD: 0.6 % (ref 0–1.9)
BILIRUB SERPL-MCNC: 0.5 MG/DL (ref 0.1–1)
BSA FOR ECHO PROCEDURE: 2.12 M2
BUN SERPL-MCNC: 20 MG/DL (ref 8–23)
CALCIUM SERPL-MCNC: 9.1 MG/DL (ref 8.7–10.5)
CHLORIDE SERPL-SCNC: 104 MMOL/L (ref 95–110)
CHOLEST SERPL-MCNC: 144 MG/DL (ref 120–199)
CHOLEST/HDLC SERPL: 5.5 {RATIO} (ref 2–5)
CO2 SERPL-SCNC: 26 MMOL/L (ref 23–29)
CREAT SERPL-MCNC: 1 MG/DL (ref 0.5–1.4)
CV ECHO LV RWT: 0.66 CM
DIFFERENTIAL METHOD: ABNORMAL
DOP CALC AO PEAK VEL: 2.69 M/S
DOP CALC AO VTI: 50.45 CM
DOP CALC LVOT AREA: 3.4 CM2
DOP CALC LVOT DIAMETER: 2.09 CM
DOP CALC LVOT PEAK VEL: 1.48 M/S
DOP CALC LVOT STROKE VOLUME: 117.48 CM3
DOP CALCLVOT PEAK VEL VTI: 34.26 CM
E WAVE DECELERATION TIME: 227.78 MSEC
E/A RATIO: 0.63
E/E' RATIO: 8.71 M/S
ECHO LV POSTERIOR WALL: 1.55 CM (ref 0.6–1.1)
EJECTION FRACTION: 70 %
EOSINOPHIL # BLD AUTO: 0.8 K/UL (ref 0–0.5)
EOSINOPHIL NFR BLD: 10.2 % (ref 0–8)
ERYTHROCYTE [DISTWIDTH] IN BLOOD BY AUTOMATED COUNT: 14 % (ref 11.5–14.5)
EST. GFR  (AFRICAN AMERICAN): >60 ML/MIN/1.73 M^2
EST. GFR  (NON AFRICAN AMERICAN): >60 ML/MIN/1.73 M^2
FRACTIONAL SHORTENING: 39 % (ref 28–44)
GLUCOSE SERPL-MCNC: 175 MG/DL (ref 70–110)
HCT VFR BLD AUTO: 37 % (ref 37–48.5)
HDLC SERPL-MCNC: 26 MG/DL (ref 40–75)
HDLC SERPL: 18.1 % (ref 20–50)
HGB BLD-MCNC: 11.6 G/DL (ref 12–16)
IMM GRANULOCYTES # BLD AUTO: 0.04 K/UL (ref 0–0.04)
IMM GRANULOCYTES NFR BLD AUTO: 0.5 % (ref 0–0.5)
INTERVENTRICULAR SEPTUM: 1.4 CM (ref 0.6–1.1)
IVRT: 129.4 MSEC
LA MAJOR: 5.8 CM
LA MINOR: 4.24 CM
LA WIDTH: 4.7 CM
LDLC SERPL CALC-MCNC: 53.6 MG/DL (ref 63–159)
LEFT ATRIUM SIZE: 3.83 CM
LEFT ATRIUM VOLUME INDEX MOD: 19 ML/M2
LEFT ATRIUM VOLUME INDEX: 36.4 ML/M2
LEFT ATRIUM VOLUME MOD: 39.17 CM3
LEFT ATRIUM VOLUME: 74.96 CM3
LEFT INTERNAL DIMENSION IN SYSTOLE: 2.86 CM (ref 2.1–4)
LEFT VENTRICLE DIASTOLIC VOLUME INDEX: 49.23 ML/M2
LEFT VENTRICLE DIASTOLIC VOLUME: 101.41 ML
LEFT VENTRICLE MASS INDEX: 138 G/M2
LEFT VENTRICLE SYSTOLIC VOLUME INDEX: 15.2 ML/M2
LEFT VENTRICLE SYSTOLIC VOLUME: 31.26 ML
LEFT VENTRICULAR INTERNAL DIMENSION IN DIASTOLE: 4.68 CM (ref 3.5–6)
LEFT VENTRICULAR MASS: 284.89 G
LV LATERAL E/E' RATIO: 7.63 M/S
LV SEPTAL E/E' RATIO: 10.17 M/S
LYMPHOCYTES # BLD AUTO: 1.3 K/UL (ref 1–4.8)
LYMPHOCYTES NFR BLD: 15.8 % (ref 18–48)
MAGNESIUM SERPL-MCNC: 1.5 MG/DL (ref 1.6–2.6)
MCH RBC QN AUTO: 27.8 PG (ref 27–31)
MCHC RBC AUTO-ENTMCNC: 31.4 G/DL (ref 32–36)
MCV RBC AUTO: 89 FL (ref 82–98)
MONOCYTES # BLD AUTO: 0.6 K/UL (ref 0.3–1)
MONOCYTES NFR BLD: 7.2 % (ref 4–15)
MV A" WAVE DURATION": 11.8 MSEC
MV MEAN GRADIENT: 1 MMHG
MV PEAK A VEL: 0.97 M/S
MV PEAK E VEL: 0.61 M/S
MV PEAK GRADIENT: 5 MMHG
MV STENOSIS PRESSURE HALF TIME: 66.06 MS
MV VALVE AREA P 1/2 METHOD: 3.33 CM2
NEUTROPHILS # BLD AUTO: 5.4 K/UL (ref 1.8–7.7)
NEUTROPHILS NFR BLD: 65.7 % (ref 38–73)
NONHDLC SERPL-MCNC: 118 MG/DL
NRBC BLD-RTO: 0 /100 WBC
PHOSPHATE SERPL-MCNC: 3.7 MG/DL (ref 2.7–4.5)
PISA TR MAX VEL: 2.49 M/S
PLATELET # BLD AUTO: 213 K/UL (ref 150–450)
PMV BLD AUTO: 9.5 FL (ref 9.2–12.9)
POCT GLUCOSE: 202 MG/DL (ref 70–110)
POCT GLUCOSE: 219 MG/DL (ref 70–110)
POTASSIUM SERPL-SCNC: 3.9 MMOL/L (ref 3.5–5.1)
PROT SERPL-MCNC: 7.1 G/DL (ref 6–8.4)
PV PEAK VELOCITY: 1.34 CM/S
RA MAJOR: 5.28 CM
RA WIDTH: 2.44 CM
RBC # BLD AUTO: 4.17 M/UL (ref 4–5.4)
RIGHT VENTRICULAR END-DIASTOLIC DIMENSION: 2.74 CM
RV TISSUE DOPPLER FREE WALL SYSTOLIC VELOCITY 1 (APICAL 4 CHAMBER VIEW): 16.48 CM/S
SODIUM SERPL-SCNC: 140 MMOL/L (ref 136–145)
TACROLIMUS BLD-MCNC: 5.9 NG/ML (ref 5–15)
TACROLIMUS, NORMALIZED: 5.3 NG/ML (ref 5–15)
TDI LATERAL: 0.08 M/S
TDI SEPTAL: 0.06 M/S
TDI: 0.07 M/S
TR MAX PG: 25 MMHG
TRICUSPID ANNULAR PLANE SYSTOLIC EXCURSION: 2.2 CM
TRIGL SERPL-MCNC: 322 MG/DL (ref 30–150)
WBC # BLD AUTO: 8.16 K/UL (ref 3.9–12.7)

## 2021-06-03 PROCEDURE — 84100 ASSAY OF PHOSPHORUS: CPT | Performed by: INTERNAL MEDICINE

## 2021-06-03 PROCEDURE — G0378 HOSPITAL OBSERVATION PER HR: HCPCS

## 2021-06-03 PROCEDURE — A4216 STERILE WATER/SALINE, 10 ML: HCPCS | Performed by: INTERNAL MEDICINE

## 2021-06-03 PROCEDURE — 97116 GAIT TRAINING THERAPY: CPT

## 2021-06-03 PROCEDURE — 36415 COLL VENOUS BLD VENIPUNCTURE: CPT | Performed by: NURSE PRACTITIONER

## 2021-06-03 PROCEDURE — 97161 PT EVAL LOW COMPLEX 20 MIN: CPT

## 2021-06-03 PROCEDURE — 94761 N-INVAS EAR/PLS OXIMETRY MLT: CPT

## 2021-06-03 PROCEDURE — 80061 LIPID PANEL: CPT | Performed by: NURSE PRACTITIONER

## 2021-06-03 PROCEDURE — 80053 COMPREHEN METABOLIC PANEL: CPT | Performed by: INTERNAL MEDICINE

## 2021-06-03 PROCEDURE — 97165 OT EVAL LOW COMPLEX 30 MIN: CPT

## 2021-06-03 PROCEDURE — 63600175 PHARM REV CODE 636 W HCPCS: Performed by: INTERNAL MEDICINE

## 2021-06-03 PROCEDURE — 96372 THER/PROPH/DIAG INJ SC/IM: CPT | Mod: 59

## 2021-06-03 PROCEDURE — 25000003 PHARM REV CODE 250: Performed by: INTERNAL MEDICINE

## 2021-06-03 PROCEDURE — 36415 COLL VENOUS BLD VENIPUNCTURE: CPT | Performed by: INTERNAL MEDICINE

## 2021-06-03 PROCEDURE — 83735 ASSAY OF MAGNESIUM: CPT | Performed by: INTERNAL MEDICINE

## 2021-06-03 PROCEDURE — 85025 COMPLETE CBC W/AUTO DIFF WBC: CPT | Performed by: INTERNAL MEDICINE

## 2021-06-03 RX ORDER — INSULIN DEGLUDEC 200 U/ML
80 INJECTION, SOLUTION SUBCUTANEOUS DAILY
Status: DISCONTINUED | OUTPATIENT
Start: 2021-06-03 | End: 2021-06-03 | Stop reason: HOSPADM

## 2021-06-03 RX ORDER — MECLIZINE HCL 12.5 MG 12.5 MG/1
12.5 TABLET ORAL 3 TIMES DAILY PRN
Qty: 15 TABLET | Refills: 0 | Status: SHIPPED | OUTPATIENT
Start: 2021-06-03 | End: 2021-06-08 | Stop reason: SDUPTHER

## 2021-06-03 RX ORDER — VILAZODONE HYDROCHLORIDE 10 MG/1
40 TABLET ORAL DAILY
Status: DISCONTINUED | OUTPATIENT
Start: 2021-06-03 | End: 2021-06-03 | Stop reason: HOSPADM

## 2021-06-03 RX ADMIN — Medication 10 ML: at 06:06

## 2021-06-03 RX ADMIN — Medication 800 MG: at 11:06

## 2021-06-03 RX ADMIN — LEVOTHYROXINE SODIUM 75 MCG: 0.03 TABLET ORAL at 06:06

## 2021-06-03 RX ADMIN — INSULIN DEGLUDEC 80 UNITS: 200 INJECTION, SOLUTION SUBCUTANEOUS at 03:06

## 2021-06-03 RX ADMIN — VILAZODONE HYDROCHLORIDE 40 MG: 10 TABLET ORAL at 12:06

## 2021-06-03 RX ADMIN — SUCRALFATE 1 G: 1 TABLET ORAL at 06:06

## 2021-06-03 RX ADMIN — TACROLIMUS 1 MG: 0.5 CAPSULE ORAL at 07:06

## 2021-06-03 RX ADMIN — Medication 800 MG: at 07:06

## 2021-06-03 RX ADMIN — SUCRALFATE 1 G: 1 TABLET ORAL at 11:06

## 2021-06-03 RX ADMIN — PANTOPRAZOLE SODIUM 40 MG: 40 TABLET, DELAYED RELEASE ORAL at 03:06

## 2021-06-03 RX ADMIN — PANTOPRAZOLE SODIUM 40 MG: 40 TABLET, DELAYED RELEASE ORAL at 06:06

## 2021-06-03 RX ADMIN — Medication 1 TABLET: at 09:06

## 2021-06-03 RX ADMIN — INSULIN ASPART 2 UNITS: 100 INJECTION, SOLUTION INTRAVENOUS; SUBCUTANEOUS at 06:06

## 2021-06-03 RX ADMIN — Medication 10 ML: at 03:06

## 2021-06-03 RX ADMIN — MAGNESIUM OXIDE TAB 400 MG (241.3 MG ELEMENTAL MG) 400 MG: 400 (241.3 MG) TAB at 09:06

## 2021-06-03 RX ADMIN — SUCRALFATE 1 G: 1 TABLET ORAL at 03:06

## 2021-06-03 RX ADMIN — TACROLIMUS 1 MG: 0.5 CAPSULE ORAL at 06:06

## 2021-06-03 RX ADMIN — THERA TABS 1 TABLET: TAB at 09:06

## 2021-06-07 ENCOUNTER — LAB VISIT (OUTPATIENT)
Dept: PRIMARY CARE CLINIC | Facility: CLINIC | Age: 61
End: 2021-06-07
Payer: MEDICARE

## 2021-06-07 DIAGNOSIS — Z01.818 PRE-OP TESTING: ICD-10-CM

## 2021-06-07 PROCEDURE — U0005 INFEC AGEN DETEC AMPLI PROBE: HCPCS | Performed by: INTERNAL MEDICINE

## 2021-06-07 PROCEDURE — U0003 INFECTIOUS AGENT DETECTION BY NUCLEIC ACID (DNA OR RNA); SEVERE ACUTE RESPIRATORY SYNDROME CORONAVIRUS 2 (SARS-COV-2) (CORONAVIRUS DISEASE [COVID-19]), AMPLIFIED PROBE TECHNIQUE, MAKING USE OF HIGH THROUGHPUT TECHNOLOGIES AS DESCRIBED BY CMS-2020-01-R: HCPCS | Performed by: INTERNAL MEDICINE

## 2021-06-08 ENCOUNTER — PATIENT MESSAGE (OUTPATIENT)
Dept: ENDOSCOPY | Facility: HOSPITAL | Age: 61
End: 2021-06-08

## 2021-06-08 ENCOUNTER — OFFICE VISIT (OUTPATIENT)
Dept: PRIMARY CARE CLINIC | Facility: CLINIC | Age: 61
End: 2021-06-08
Payer: MEDICARE

## 2021-06-08 VITALS
HEIGHT: 67 IN | HEART RATE: 102 BPM | TEMPERATURE: 98 F | OXYGEN SATURATION: 97 % | SYSTOLIC BLOOD PRESSURE: 110 MMHG | BODY MASS INDEX: 33.8 KG/M2 | WEIGHT: 215.38 LBS | DIASTOLIC BLOOD PRESSURE: 70 MMHG

## 2021-06-08 DIAGNOSIS — R42 VERTIGO: ICD-10-CM

## 2021-06-08 LAB — SARS-COV-2 RNA RESP QL NAA+PROBE: NOT DETECTED

## 2021-06-08 PROCEDURE — 3008F BODY MASS INDEX DOCD: CPT | Mod: CPTII,S$GLB,, | Performed by: NURSE PRACTITIONER

## 2021-06-08 PROCEDURE — 3008F PR BODY MASS INDEX (BMI) DOCUMENTED: ICD-10-PCS | Mod: CPTII,S$GLB,, | Performed by: NURSE PRACTITIONER

## 2021-06-08 PROCEDURE — 1125F PR PAIN SEVERITY QUANTIFIED, PAIN PRESENT: ICD-10-PCS | Mod: S$GLB,,, | Performed by: NURSE PRACTITIONER

## 2021-06-08 PROCEDURE — 99214 OFFICE O/P EST MOD 30 MIN: CPT | Mod: S$GLB,,, | Performed by: NURSE PRACTITIONER

## 2021-06-08 PROCEDURE — 99214 PR OFFICE/OUTPT VISIT, EST, LEVL IV, 30-39 MIN: ICD-10-PCS | Mod: S$GLB,,, | Performed by: NURSE PRACTITIONER

## 2021-06-08 PROCEDURE — 1125F AMNT PAIN NOTED PAIN PRSNT: CPT | Mod: S$GLB,,, | Performed by: NURSE PRACTITIONER

## 2021-06-08 RX ORDER — MECLIZINE HCL 12.5 MG 12.5 MG/1
12.5 TABLET ORAL 3 TIMES DAILY PRN
Qty: 15 TABLET | Refills: 0 | Status: SHIPPED | OUTPATIENT
Start: 2021-06-08 | End: 2022-02-01

## 2021-06-08 RX ORDER — ONDANSETRON HYDROCHLORIDE 8 MG/1
8 TABLET, FILM COATED ORAL EVERY 8 HOURS PRN
Qty: 15 TABLET | Refills: 0 | Status: SHIPPED | OUTPATIENT
Start: 2021-06-08 | End: 2022-02-01

## 2021-06-10 ENCOUNTER — HOSPITAL ENCOUNTER (OUTPATIENT)
Facility: HOSPITAL | Age: 61
Discharge: HOME | End: 2021-06-10
Attending: INTERNAL MEDICINE | Admitting: INTERNAL MEDICINE
Payer: MEDICARE

## 2021-06-10 ENCOUNTER — ANESTHESIA EVENT (OUTPATIENT)
Dept: ENDOSCOPY | Facility: HOSPITAL | Age: 61
End: 2021-06-10
Payer: MEDICARE

## 2021-06-10 ENCOUNTER — ANESTHESIA (OUTPATIENT)
Dept: ENDOSCOPY | Facility: HOSPITAL | Age: 61
End: 2021-06-10
Payer: MEDICARE

## 2021-06-10 DIAGNOSIS — K21.9 GASTROESOPHAGEAL REFLUX DISEASE WITHOUT ESOPHAGITIS: ICD-10-CM

## 2021-06-10 DIAGNOSIS — R93.2 ABNORMAL CT OF LIVER: ICD-10-CM

## 2021-06-10 PROCEDURE — D9220A PRA ANESTHESIA: Mod: CRNA,,, | Performed by: NURSE ANESTHETIST, CERTIFIED REGISTERED

## 2021-06-10 PROCEDURE — 43251 EGD REMOVE LESION SNARE: CPT | Performed by: INTERNAL MEDICINE

## 2021-06-10 PROCEDURE — 25000003 PHARM REV CODE 250: Performed by: INTERNAL MEDICINE

## 2021-06-10 PROCEDURE — 63600175 PHARM REV CODE 636 W HCPCS: Performed by: NURSE ANESTHETIST, CERTIFIED REGISTERED

## 2021-06-10 PROCEDURE — D9220A PRA ANESTHESIA: ICD-10-PCS | Mod: CRNA,,, | Performed by: NURSE ANESTHETIST, CERTIFIED REGISTERED

## 2021-06-10 PROCEDURE — 27201089 HC SNARE, DISP (ANY): Performed by: INTERNAL MEDICINE

## 2021-06-10 PROCEDURE — 43248 PR EGD, FLEX, W/DILATION OVER GUIDEWIRE: ICD-10-PCS | Mod: 51,,, | Performed by: INTERNAL MEDICINE

## 2021-06-10 PROCEDURE — 88305 TISSUE EXAM BY PATHOLOGIST: ICD-10-PCS | Mod: 26,,, | Performed by: PATHOLOGY

## 2021-06-10 PROCEDURE — 43239 PR EGD, FLEX, W/BIOPSY, SGL/MULTI: ICD-10-PCS | Mod: 59,,, | Performed by: INTERNAL MEDICINE

## 2021-06-10 PROCEDURE — 37000008 HC ANESTHESIA 1ST 15 MINUTES: Performed by: INTERNAL MEDICINE

## 2021-06-10 PROCEDURE — 37000009 HC ANESTHESIA EA ADD 15 MINS: Performed by: INTERNAL MEDICINE

## 2021-06-10 PROCEDURE — 88342 CHG IMMUNOCYTOCHEMISTRY: ICD-10-PCS | Mod: 26,,, | Performed by: PATHOLOGY

## 2021-06-10 PROCEDURE — 43248 EGD GUIDE WIRE INSERTION: CPT | Performed by: INTERNAL MEDICINE

## 2021-06-10 PROCEDURE — 25000003 PHARM REV CODE 250: Performed by: NURSE ANESTHETIST, CERTIFIED REGISTERED

## 2021-06-10 PROCEDURE — 43251 PR EGD, FLEX, W/REMOVAL, TUMOR/POLYP/LESION(S), SNARE: ICD-10-PCS | Mod: ,,, | Performed by: INTERNAL MEDICINE

## 2021-06-10 PROCEDURE — 43239 EGD BIOPSY SINGLE/MULTIPLE: CPT | Mod: 59,,, | Performed by: INTERNAL MEDICINE

## 2021-06-10 PROCEDURE — 43239 EGD BIOPSY SINGLE/MULTIPLE: CPT | Performed by: INTERNAL MEDICINE

## 2021-06-10 PROCEDURE — 27201012 HC FORCEPS, HOT/COLD, DISP: Performed by: INTERNAL MEDICINE

## 2021-06-10 PROCEDURE — 88342 IMHCHEM/IMCYTCHM 1ST ANTB: CPT | Performed by: PATHOLOGY

## 2021-06-10 PROCEDURE — 43248 EGD GUIDE WIRE INSERTION: CPT | Mod: 51,,, | Performed by: INTERNAL MEDICINE

## 2021-06-10 PROCEDURE — C1769 GUIDE WIRE: HCPCS | Performed by: INTERNAL MEDICINE

## 2021-06-10 PROCEDURE — D9220A PRA ANESTHESIA: Mod: ANES,,, | Performed by: ANESTHESIOLOGY

## 2021-06-10 PROCEDURE — 43251 EGD REMOVE LESION SNARE: CPT | Mod: ,,, | Performed by: INTERNAL MEDICINE

## 2021-06-10 PROCEDURE — 88342 IMHCHEM/IMCYTCHM 1ST ANTB: CPT | Mod: 26,,, | Performed by: PATHOLOGY

## 2021-06-10 PROCEDURE — 88305 TISSUE EXAM BY PATHOLOGIST: CPT | Mod: 26,,, | Performed by: PATHOLOGY

## 2021-06-10 PROCEDURE — D9220A PRA ANESTHESIA: ICD-10-PCS | Mod: ANES,,, | Performed by: ANESTHESIOLOGY

## 2021-06-10 PROCEDURE — 88305 TISSUE EXAM BY PATHOLOGIST: CPT | Mod: 59 | Performed by: PATHOLOGY

## 2021-06-10 RX ORDER — SODIUM CHLORIDE 9 MG/ML
INJECTION, SOLUTION INTRAVENOUS CONTINUOUS
Status: DISCONTINUED | OUTPATIENT
Start: 2021-06-10 | End: 2021-06-10 | Stop reason: HOSPADM

## 2021-06-10 RX ORDER — PANTOPRAZOLE SODIUM 40 MG/1
40 TABLET, DELAYED RELEASE ORAL DAILY
Qty: 90 TABLET | Refills: 3 | Status: SHIPPED | OUTPATIENT
Start: 2021-06-10 | End: 2021-10-14

## 2021-06-10 RX ORDER — LIDOCAINE HYDROCHLORIDE 20 MG/ML
INJECTION INTRAVENOUS
Status: DISCONTINUED | OUTPATIENT
Start: 2021-06-10 | End: 2021-06-10

## 2021-06-10 RX ORDER — FAMOTIDINE 40 MG/1
40 TABLET, FILM COATED ORAL NIGHTLY
Qty: 30 TABLET | Refills: 1 | Status: SHIPPED | OUTPATIENT
Start: 2021-06-10 | End: 2021-07-22

## 2021-06-10 RX ORDER — PROPOFOL 10 MG/ML
VIAL (ML) INTRAVENOUS
Status: DISCONTINUED | OUTPATIENT
Start: 2021-06-10 | End: 2021-06-10

## 2021-06-10 RX ADMIN — PROPOFOL 50 MG: 10 INJECTION, EMULSION INTRAVENOUS at 09:06

## 2021-06-10 RX ADMIN — SODIUM CHLORIDE: 0.9 INJECTION, SOLUTION INTRAVENOUS at 09:06

## 2021-06-10 RX ADMIN — LIDOCAINE HYDROCHLORIDE 100 MG: 20 INJECTION, SOLUTION INTRAVENOUS at 09:06

## 2021-06-10 RX ADMIN — PROPOFOL 100 MG: 10 INJECTION, EMULSION INTRAVENOUS at 09:06

## 2021-06-11 VITALS
SYSTOLIC BLOOD PRESSURE: 160 MMHG | HEART RATE: 70 BPM | DIASTOLIC BLOOD PRESSURE: 72 MMHG | WEIGHT: 215 LBS | TEMPERATURE: 98 F | BODY MASS INDEX: 33.67 KG/M2 | RESPIRATION RATE: 17 BRPM | OXYGEN SATURATION: 97 %

## 2021-06-13 ENCOUNTER — PATIENT OUTREACH (OUTPATIENT)
Dept: ADMINISTRATIVE | Facility: HOSPITAL | Age: 61
End: 2021-06-13

## 2021-06-17 LAB
FINAL PATHOLOGIC DIAGNOSIS: NORMAL
GROSS: NORMAL
Lab: NORMAL

## 2021-06-18 ENCOUNTER — TELEPHONE (OUTPATIENT)
Dept: UROLOGY | Facility: CLINIC | Age: 61
End: 2021-06-18

## 2021-06-23 ENCOUNTER — PATIENT MESSAGE (OUTPATIENT)
Dept: PRIMARY CARE CLINIC | Facility: CLINIC | Age: 61
End: 2021-06-23

## 2021-06-23 ENCOUNTER — TELEPHONE (OUTPATIENT)
Dept: PRIMARY CARE CLINIC | Facility: CLINIC | Age: 61
End: 2021-06-23

## 2021-06-23 ENCOUNTER — PATIENT MESSAGE (OUTPATIENT)
Dept: GASTROENTEROLOGY | Facility: CLINIC | Age: 61
End: 2021-06-23

## 2021-07-07 ENCOUNTER — PATIENT MESSAGE (OUTPATIENT)
Dept: ADMINISTRATIVE | Facility: HOSPITAL | Age: 61
End: 2021-07-07

## 2021-07-12 ENCOUNTER — LAB VISIT (OUTPATIENT)
Dept: LAB | Facility: HOSPITAL | Age: 61
End: 2021-07-12
Attending: INTERNAL MEDICINE
Payer: MEDICARE

## 2021-07-12 DIAGNOSIS — Z94.0 KIDNEY REPLACED BY TRANSPLANT: ICD-10-CM

## 2021-07-12 DIAGNOSIS — B34.3 PARVOVIRUS B19 INFECTION: ICD-10-CM

## 2021-07-12 PROCEDURE — 87799 DETECT AGENT NOS DNA QUANT: CPT | Performed by: INTERNAL MEDICINE

## 2021-07-12 PROCEDURE — 36415 COLL VENOUS BLD VENIPUNCTURE: CPT | Mod: PO | Performed by: INTERNAL MEDICINE

## 2021-07-16 LAB
B19V DNA # SPEC NAA+PROBE: 189 COPIES/ML
SPECIMEN SOURCE: ABNORMAL

## 2021-08-05 ENCOUNTER — HOSPITAL ENCOUNTER (OUTPATIENT)
Dept: RADIOLOGY | Facility: CLINIC | Age: 61
Discharge: HOME | End: 2021-08-05
Attending: NURSE PRACTITIONER
Payer: MEDICARE

## 2021-08-05 DIAGNOSIS — Z12.31 ENCOUNTER FOR SCREENING MAMMOGRAM FOR MALIGNANT NEOPLASM OF BREAST: ICD-10-CM

## 2021-08-05 PROCEDURE — 77067 SCR MAMMO BI INCL CAD: CPT | Mod: TC,PO

## 2021-08-05 PROCEDURE — 77063 BREAST TOMOSYNTHESIS BI: CPT | Mod: 26,,, | Performed by: RADIOLOGY

## 2021-08-05 PROCEDURE — 77067 SCR MAMMO BI INCL CAD: CPT | Mod: 26,,, | Performed by: RADIOLOGY

## 2021-08-05 PROCEDURE — 77063 MAMMO DIGITAL SCREENING BILAT WITH TOMO: ICD-10-PCS | Mod: 26,,, | Performed by: RADIOLOGY

## 2021-08-05 PROCEDURE — 77067 MAMMO DIGITAL SCREENING BILAT WITH TOMO: ICD-10-PCS | Mod: 26,,, | Performed by: RADIOLOGY

## 2021-08-10 ENCOUNTER — OFFICE VISIT (OUTPATIENT)
Dept: PRIMARY CARE CLINIC | Facility: CLINIC | Age: 61
End: 2021-08-10
Payer: MEDICARE

## 2021-08-10 VITALS
WEIGHT: 218.94 LBS | DIASTOLIC BLOOD PRESSURE: 60 MMHG | HEIGHT: 67 IN | HEART RATE: 88 BPM | BODY MASS INDEX: 34.36 KG/M2 | SYSTOLIC BLOOD PRESSURE: 150 MMHG | OXYGEN SATURATION: 97 % | TEMPERATURE: 98 F

## 2021-08-10 DIAGNOSIS — I12.9 HYPERTENSION ASSOCIATED WITH STAGE 3 CHRONIC KIDNEY DISEASE DUE TO TYPE 2 DIABETES MELLITUS: Primary | ICD-10-CM

## 2021-08-10 DIAGNOSIS — N18.30 HYPERTENSION ASSOCIATED WITH STAGE 3 CHRONIC KIDNEY DISEASE DUE TO TYPE 2 DIABETES MELLITUS: Primary | ICD-10-CM

## 2021-08-10 DIAGNOSIS — E11.22 HYPERTENSION ASSOCIATED WITH STAGE 3 CHRONIC KIDNEY DISEASE DUE TO TYPE 2 DIABETES MELLITUS: Primary | ICD-10-CM

## 2021-08-10 DIAGNOSIS — E11.8 CONTROLLED TYPE 2 DIABETES MELLITUS WITH COMPLICATION, WITH LONG-TERM CURRENT USE OF INSULIN: ICD-10-CM

## 2021-08-10 DIAGNOSIS — F33.0 MDD (MAJOR DEPRESSIVE DISORDER), RECURRENT EPISODE, MILD: ICD-10-CM

## 2021-08-10 DIAGNOSIS — Z79.4 CONTROLLED TYPE 2 DIABETES MELLITUS WITH COMPLICATION, WITH LONG-TERM CURRENT USE OF INSULIN: ICD-10-CM

## 2021-08-10 PROCEDURE — 99499 RISK ADDL DX/OHS AUDIT: ICD-10-PCS | Mod: S$GLB,,, | Performed by: NURSE PRACTITIONER

## 2021-08-10 PROCEDURE — 1159F MED LIST DOCD IN RCRD: CPT | Mod: CPTII,S$GLB,, | Performed by: NURSE PRACTITIONER

## 2021-08-10 PROCEDURE — 99215 OFFICE O/P EST HI 40 MIN: CPT | Mod: S$GLB,,, | Performed by: NURSE PRACTITIONER

## 2021-08-10 PROCEDURE — 3051F PR MOST RECENT HEMOGLOBIN A1C LEVEL 7.0 - < 8.0%: ICD-10-PCS | Mod: CPTII,S$GLB,, | Performed by: NURSE PRACTITIONER

## 2021-08-10 PROCEDURE — 3078F PR MOST RECENT DIASTOLIC BLOOD PRESSURE < 80 MM HG: ICD-10-PCS | Mod: CPTII,S$GLB,, | Performed by: NURSE PRACTITIONER

## 2021-08-10 PROCEDURE — 3078F DIAST BP <80 MM HG: CPT | Mod: CPTII,S$GLB,, | Performed by: NURSE PRACTITIONER

## 2021-08-10 PROCEDURE — 3051F HG A1C>EQUAL 7.0%<8.0%: CPT | Mod: CPTII,S$GLB,, | Performed by: NURSE PRACTITIONER

## 2021-08-10 PROCEDURE — 99499 UNLISTED E&M SERVICE: CPT | Mod: S$GLB,,, | Performed by: NURSE PRACTITIONER

## 2021-08-10 PROCEDURE — 3008F BODY MASS INDEX DOCD: CPT | Mod: CPTII,S$GLB,, | Performed by: NURSE PRACTITIONER

## 2021-08-10 PROCEDURE — 3077F SYST BP >= 140 MM HG: CPT | Mod: CPTII,S$GLB,, | Performed by: NURSE PRACTITIONER

## 2021-08-10 PROCEDURE — 1159F PR MEDICATION LIST DOCUMENTED IN MEDICAL RECORD: ICD-10-PCS | Mod: CPTII,S$GLB,, | Performed by: NURSE PRACTITIONER

## 2021-08-10 PROCEDURE — 1126F PR PAIN SEVERITY QUANTIFIED, NO PAIN PRESENT: ICD-10-PCS | Mod: CPTII,S$GLB,, | Performed by: NURSE PRACTITIONER

## 2021-08-10 PROCEDURE — 3077F PR MOST RECENT SYSTOLIC BLOOD PRESSURE >= 140 MM HG: ICD-10-PCS | Mod: CPTII,S$GLB,, | Performed by: NURSE PRACTITIONER

## 2021-08-10 PROCEDURE — 1126F AMNT PAIN NOTED NONE PRSNT: CPT | Mod: CPTII,S$GLB,, | Performed by: NURSE PRACTITIONER

## 2021-08-10 PROCEDURE — 99215 PR OFFICE/OUTPT VISIT, EST, LEVL V, 40-54 MIN: ICD-10-PCS | Mod: S$GLB,,, | Performed by: NURSE PRACTITIONER

## 2021-08-10 PROCEDURE — 3008F PR BODY MASS INDEX (BMI) DOCUMENTED: ICD-10-PCS | Mod: CPTII,S$GLB,, | Performed by: NURSE PRACTITIONER

## 2021-08-10 RX ORDER — LOSARTAN POTASSIUM 50 MG/1
50 TABLET ORAL DAILY
Qty: 90 TABLET | Refills: 3 | Status: SHIPPED | OUTPATIENT
Start: 2021-08-10 | End: 2022-07-20 | Stop reason: SDUPTHER

## 2021-08-10 RX ORDER — INSULIN DEGLUDEC 200 U/ML
80 INJECTION, SOLUTION SUBCUTANEOUS EVERY MORNING
Qty: 6 ML | Refills: 10 | Status: SHIPPED | OUTPATIENT
Start: 2021-08-10 | End: 2021-10-21 | Stop reason: SDUPTHER

## 2021-08-10 RX ORDER — INSULIN ASPART 100 [IU]/ML
INJECTION, SOLUTION INTRAVENOUS; SUBCUTANEOUS
Qty: 50 ML | Refills: 11 | Status: SHIPPED | OUTPATIENT
Start: 2021-08-10 | End: 2022-05-17

## 2021-08-23 ENCOUNTER — PATIENT MESSAGE (OUTPATIENT)
Dept: TRANSPLANT | Facility: CLINIC | Age: 61
End: 2021-08-23

## 2021-09-09 DIAGNOSIS — F32.9 REACTIVE DEPRESSION: ICD-10-CM

## 2021-09-09 RX ORDER — VILAZODONE HYDROCHLORIDE 40 MG/1
40 TABLET ORAL DAILY
Qty: 30 TABLET | Refills: 4 | Status: SHIPPED | OUTPATIENT
Start: 2021-09-09

## 2021-10-11 ENCOUNTER — LAB VISIT (OUTPATIENT)
Dept: LAB | Facility: HOSPITAL | Age: 61
End: 2021-10-11
Attending: INTERNAL MEDICINE
Payer: MEDICARE

## 2021-10-11 DIAGNOSIS — B34.3 PARVOVIRUS B19 INFECTION: ICD-10-CM

## 2021-10-11 DIAGNOSIS — Z94.0 KIDNEY REPLACED BY TRANSPLANT: ICD-10-CM

## 2021-10-11 PROCEDURE — 36415 COLL VENOUS BLD VENIPUNCTURE: CPT | Mod: PO | Performed by: INTERNAL MEDICINE

## 2021-10-11 PROCEDURE — 87799 DETECT AGENT NOS DNA QUANT: CPT | Performed by: INTERNAL MEDICINE

## 2021-10-16 LAB
B19V DNA # SPEC NAA+PROBE: 147 COPIES/ML
SPECIMEN SOURCE: ABNORMAL

## 2021-10-18 ENCOUNTER — TELEPHONE (OUTPATIENT)
Dept: TRANSPLANT | Facility: CLINIC | Age: 61
End: 2021-10-18

## 2021-10-19 ENCOUNTER — TELEPHONE (OUTPATIENT)
Dept: PRIMARY CARE CLINIC | Facility: CLINIC | Age: 61
End: 2021-10-19

## 2021-10-21 ENCOUNTER — TELEPHONE (OUTPATIENT)
Dept: PRIMARY CARE CLINIC | Facility: CLINIC | Age: 61
End: 2021-10-21

## 2021-10-21 RX ORDER — INSULIN DEGLUDEC 200 U/ML
80 INJECTION, SOLUTION SUBCUTANEOUS EVERY MORNING
Qty: 6 PEN | Refills: 3 | Status: SHIPPED | OUTPATIENT
Start: 2021-10-21 | End: 2021-12-06

## 2021-10-22 ENCOUNTER — PATIENT MESSAGE (OUTPATIENT)
Dept: FAMILY MEDICINE | Facility: CLINIC | Age: 61
End: 2021-10-22
Payer: MEDICAID

## 2021-10-25 DIAGNOSIS — E11.65 UNCONTROLLED TYPE 2 DIABETES MELLITUS WITH HYPERGLYCEMIA: Primary | ICD-10-CM

## 2021-10-26 ENCOUNTER — OFFICE VISIT (OUTPATIENT)
Dept: URGENT CARE | Facility: CLINIC | Age: 61
End: 2021-10-26
Payer: MEDICARE

## 2021-10-26 ENCOUNTER — TELEPHONE (OUTPATIENT)
Dept: PRIMARY CARE CLINIC | Facility: CLINIC | Age: 61
End: 2021-10-26
Payer: MEDICARE

## 2021-10-26 VITALS
TEMPERATURE: 98 F | HEART RATE: 88 BPM | RESPIRATION RATE: 16 BRPM | BODY MASS INDEX: 34.21 KG/M2 | OXYGEN SATURATION: 97 % | WEIGHT: 218 LBS | SYSTOLIC BLOOD PRESSURE: 146 MMHG | DIASTOLIC BLOOD PRESSURE: 66 MMHG | HEIGHT: 67 IN

## 2021-10-26 DIAGNOSIS — E78.2 MIXED DIABETIC HYPERLIPIDEMIA ASSOCIATED WITH TYPE 2 DIABETES MELLITUS: ICD-10-CM

## 2021-10-26 DIAGNOSIS — Z20.822 CLOSE EXPOSURE TO COVID-19 VIRUS: ICD-10-CM

## 2021-10-26 DIAGNOSIS — R05.9 COUGH: Primary | ICD-10-CM

## 2021-10-26 DIAGNOSIS — E11.65 UNCONTROLLED TYPE 2 DIABETES MELLITUS WITH HYPERGLYCEMIA: ICD-10-CM

## 2021-10-26 DIAGNOSIS — E11.69 MIXED DIABETIC HYPERLIPIDEMIA ASSOCIATED WITH TYPE 2 DIABETES MELLITUS: ICD-10-CM

## 2021-10-26 LAB
CTP QC/QA: YES
SARS-COV-2 RDRP RESP QL NAA+PROBE: NEGATIVE

## 2021-10-26 PROCEDURE — 3078F PR MOST RECENT DIASTOLIC BLOOD PRESSURE < 80 MM HG: ICD-10-PCS | Mod: CPTII,S$GLB,, | Performed by: PHYSICIAN ASSISTANT

## 2021-10-26 PROCEDURE — 3077F PR MOST RECENT SYSTOLIC BLOOD PRESSURE >= 140 MM HG: ICD-10-PCS | Mod: CPTII,S$GLB,, | Performed by: PHYSICIAN ASSISTANT

## 2021-10-26 PROCEDURE — U0002: ICD-10-PCS | Mod: QW,S$GLB,, | Performed by: PHYSICIAN ASSISTANT

## 2021-10-26 PROCEDURE — 99213 PR OFFICE/OUTPT VISIT, EST, LEVL III, 20-29 MIN: ICD-10-PCS | Mod: S$GLB,,, | Performed by: PHYSICIAN ASSISTANT

## 2021-10-26 PROCEDURE — 3077F SYST BP >= 140 MM HG: CPT | Mod: CPTII,S$GLB,, | Performed by: PHYSICIAN ASSISTANT

## 2021-10-26 PROCEDURE — 3008F PR BODY MASS INDEX (BMI) DOCUMENTED: ICD-10-PCS | Mod: CPTII,S$GLB,, | Performed by: PHYSICIAN ASSISTANT

## 2021-10-26 PROCEDURE — 1160F PR REVIEW ALL MEDS BY PRESCRIBER/CLIN PHARMACIST DOCUMENTED: ICD-10-PCS | Mod: CPTII,S$GLB,, | Performed by: PHYSICIAN ASSISTANT

## 2021-10-26 PROCEDURE — 3066F NEPHROPATHY DOC TX: CPT | Mod: CPTII,S$GLB,, | Performed by: PHYSICIAN ASSISTANT

## 2021-10-26 PROCEDURE — 1160F RVW MEDS BY RX/DR IN RCRD: CPT | Mod: CPTII,S$GLB,, | Performed by: PHYSICIAN ASSISTANT

## 2021-10-26 PROCEDURE — 3066F PR DOCUMENTATION OF TREATMENT FOR NEPHROPATHY: ICD-10-PCS | Mod: CPTII,S$GLB,, | Performed by: PHYSICIAN ASSISTANT

## 2021-10-26 PROCEDURE — 4010F PR ACE/ARB THEARPY RXD/TAKEN: ICD-10-PCS | Mod: CPTII,S$GLB,, | Performed by: PHYSICIAN ASSISTANT

## 2021-10-26 PROCEDURE — 3008F BODY MASS INDEX DOCD: CPT | Mod: CPTII,S$GLB,, | Performed by: PHYSICIAN ASSISTANT

## 2021-10-26 PROCEDURE — 99213 OFFICE O/P EST LOW 20 MIN: CPT | Mod: S$GLB,,, | Performed by: PHYSICIAN ASSISTANT

## 2021-10-26 PROCEDURE — 3078F DIAST BP <80 MM HG: CPT | Mod: CPTII,S$GLB,, | Performed by: PHYSICIAN ASSISTANT

## 2021-10-26 PROCEDURE — U0002 COVID-19 LAB TEST NON-CDC: HCPCS | Mod: QW,S$GLB,, | Performed by: PHYSICIAN ASSISTANT

## 2021-10-26 PROCEDURE — 1159F PR MEDICATION LIST DOCUMENTED IN MEDICAL RECORD: ICD-10-PCS | Mod: CPTII,S$GLB,, | Performed by: PHYSICIAN ASSISTANT

## 2021-10-26 PROCEDURE — 4010F ACE/ARB THERAPY RXD/TAKEN: CPT | Mod: CPTII,S$GLB,, | Performed by: PHYSICIAN ASSISTANT

## 2021-10-26 PROCEDURE — 3051F HG A1C>EQUAL 7.0%<8.0%: CPT | Mod: CPTII,S$GLB,, | Performed by: PHYSICIAN ASSISTANT

## 2021-10-26 PROCEDURE — 3051F PR MOST RECENT HEMOGLOBIN A1C LEVEL 7.0 - < 8.0%: ICD-10-PCS | Mod: CPTII,S$GLB,, | Performed by: PHYSICIAN ASSISTANT

## 2021-10-26 PROCEDURE — 1159F MED LIST DOCD IN RCRD: CPT | Mod: CPTII,S$GLB,, | Performed by: PHYSICIAN ASSISTANT

## 2021-10-26 RX ORDER — FLASH GLUCOSE SENSOR
KIT MISCELLANEOUS
Qty: 6 KIT | Refills: 3 | Status: SHIPPED | OUTPATIENT
Start: 2021-10-26 | End: 2024-03-21

## 2021-10-26 RX ORDER — FLASH GLUCOSE SENSOR
KIT MISCELLANEOUS
Qty: 6 KIT | Refills: 3 | Status: SHIPPED | OUTPATIENT
Start: 2021-10-26 | End: 2021-10-26 | Stop reason: SDUPTHER

## 2021-10-29 ENCOUNTER — PATIENT MESSAGE (OUTPATIENT)
Dept: TRANSPLANT | Facility: CLINIC | Age: 61
End: 2021-10-29
Payer: MEDICARE

## 2021-11-02 ENCOUNTER — OFFICE VISIT (OUTPATIENT)
Dept: URGENT CARE | Facility: CLINIC | Age: 61
End: 2021-11-02
Payer: MEDICARE

## 2021-11-02 VITALS
OXYGEN SATURATION: 99 % | BODY MASS INDEX: 34.21 KG/M2 | TEMPERATURE: 98 F | DIASTOLIC BLOOD PRESSURE: 79 MMHG | HEART RATE: 98 BPM | RESPIRATION RATE: 16 BRPM | SYSTOLIC BLOOD PRESSURE: 134 MMHG | WEIGHT: 218 LBS | HEIGHT: 67 IN

## 2021-11-02 DIAGNOSIS — U07.1 COVID-19 VIRUS INFECTION: Primary | ICD-10-CM

## 2021-11-02 DIAGNOSIS — R05.9 COUGH: ICD-10-CM

## 2021-11-02 LAB
CTP QC/QA: YES
SARS-COV-2 RDRP RESP QL NAA+PROBE: POSITIVE

## 2021-11-02 PROCEDURE — 3066F PR DOCUMENTATION OF TREATMENT FOR NEPHROPATHY: ICD-10-PCS | Mod: CPTII,S$GLB,, | Performed by: PHYSICIAN ASSISTANT

## 2021-11-02 PROCEDURE — 4010F ACE/ARB THERAPY RXD/TAKEN: CPT | Mod: CPTII,S$GLB,, | Performed by: PHYSICIAN ASSISTANT

## 2021-11-02 PROCEDURE — U0002 COVID-19 LAB TEST NON-CDC: HCPCS | Mod: QW,S$GLB,, | Performed by: PHYSICIAN ASSISTANT

## 2021-11-02 PROCEDURE — 3078F DIAST BP <80 MM HG: CPT | Mod: CPTII,S$GLB,, | Performed by: PHYSICIAN ASSISTANT

## 2021-11-02 PROCEDURE — 1159F PR MEDICATION LIST DOCUMENTED IN MEDICAL RECORD: ICD-10-PCS | Mod: CPTII,S$GLB,, | Performed by: PHYSICIAN ASSISTANT

## 2021-11-02 PROCEDURE — 1159F MED LIST DOCD IN RCRD: CPT | Mod: CPTII,S$GLB,, | Performed by: PHYSICIAN ASSISTANT

## 2021-11-02 PROCEDURE — 3066F NEPHROPATHY DOC TX: CPT | Mod: CPTII,S$GLB,, | Performed by: PHYSICIAN ASSISTANT

## 2021-11-02 PROCEDURE — 1160F PR REVIEW ALL MEDS BY PRESCRIBER/CLIN PHARMACIST DOCUMENTED: ICD-10-PCS | Mod: CPTII,S$GLB,, | Performed by: PHYSICIAN ASSISTANT

## 2021-11-02 PROCEDURE — 3051F PR MOST RECENT HEMOGLOBIN A1C LEVEL 7.0 - < 8.0%: ICD-10-PCS | Mod: CPTII,S$GLB,, | Performed by: PHYSICIAN ASSISTANT

## 2021-11-02 PROCEDURE — 3075F PR MOST RECENT SYSTOLIC BLOOD PRESS GE 130-139MM HG: ICD-10-PCS | Mod: CPTII,S$GLB,, | Performed by: PHYSICIAN ASSISTANT

## 2021-11-02 PROCEDURE — 3051F HG A1C>EQUAL 7.0%<8.0%: CPT | Mod: CPTII,S$GLB,, | Performed by: PHYSICIAN ASSISTANT

## 2021-11-02 PROCEDURE — 3075F SYST BP GE 130 - 139MM HG: CPT | Mod: CPTII,S$GLB,, | Performed by: PHYSICIAN ASSISTANT

## 2021-11-02 PROCEDURE — 3078F PR MOST RECENT DIASTOLIC BLOOD PRESSURE < 80 MM HG: ICD-10-PCS | Mod: CPTII,S$GLB,, | Performed by: PHYSICIAN ASSISTANT

## 2021-11-02 PROCEDURE — U0002: ICD-10-PCS | Mod: QW,S$GLB,, | Performed by: PHYSICIAN ASSISTANT

## 2021-11-02 PROCEDURE — 99213 PR OFFICE/OUTPT VISIT, EST, LEVL III, 20-29 MIN: ICD-10-PCS | Mod: S$GLB,,, | Performed by: PHYSICIAN ASSISTANT

## 2021-11-02 PROCEDURE — 3008F BODY MASS INDEX DOCD: CPT | Mod: CPTII,S$GLB,, | Performed by: PHYSICIAN ASSISTANT

## 2021-11-02 PROCEDURE — 99213 OFFICE O/P EST LOW 20 MIN: CPT | Mod: S$GLB,,, | Performed by: PHYSICIAN ASSISTANT

## 2021-11-02 PROCEDURE — 4010F PR ACE/ARB THEARPY RXD/TAKEN: ICD-10-PCS | Mod: CPTII,S$GLB,, | Performed by: PHYSICIAN ASSISTANT

## 2021-11-02 PROCEDURE — 1160F RVW MEDS BY RX/DR IN RCRD: CPT | Mod: CPTII,S$GLB,, | Performed by: PHYSICIAN ASSISTANT

## 2021-11-02 PROCEDURE — 3008F PR BODY MASS INDEX (BMI) DOCUMENTED: ICD-10-PCS | Mod: CPTII,S$GLB,, | Performed by: PHYSICIAN ASSISTANT

## 2021-11-02 RX ORDER — ALBUTEROL SULFATE 90 UG/1
2 AEROSOL, METERED RESPIRATORY (INHALATION) EVERY 6 HOURS PRN
Qty: 18 G | Refills: 3 | Status: SHIPPED | OUTPATIENT
Start: 2021-11-02 | End: 2022-02-01

## 2021-11-02 RX ORDER — BENZONATATE 100 MG/1
200 CAPSULE ORAL 3 TIMES DAILY PRN
Qty: 30 CAPSULE | Refills: 1 | Status: SHIPPED | OUTPATIENT
Start: 2021-11-02 | End: 2021-11-12

## 2021-11-03 ENCOUNTER — TELEPHONE (OUTPATIENT)
Dept: RESEARCH | Facility: HOSPITAL | Age: 61
End: 2021-11-03
Payer: MEDICARE

## 2021-11-17 ENCOUNTER — OFFICE VISIT (OUTPATIENT)
Dept: PRIMARY CARE CLINIC | Facility: CLINIC | Age: 61
End: 2021-11-17
Payer: MEDICARE

## 2021-11-17 ENCOUNTER — LAB VISIT (OUTPATIENT)
Dept: LAB | Facility: HOSPITAL | Age: 61
End: 2021-11-17
Attending: NURSE PRACTITIONER
Payer: MEDICARE

## 2021-11-17 ENCOUNTER — PATIENT MESSAGE (OUTPATIENT)
Dept: ADMINISTRATIVE | Facility: HOSPITAL | Age: 61
End: 2021-11-17
Payer: MEDICARE

## 2021-11-17 VITALS
DIASTOLIC BLOOD PRESSURE: 65 MMHG | HEIGHT: 67 IN | HEART RATE: 76 BPM | SYSTOLIC BLOOD PRESSURE: 135 MMHG | OXYGEN SATURATION: 98 % | TEMPERATURE: 98 F | WEIGHT: 220.88 LBS | BODY MASS INDEX: 34.67 KG/M2

## 2021-11-17 DIAGNOSIS — N25.81 SECONDARY HYPERPARATHYROIDISM OF RENAL ORIGIN: ICD-10-CM

## 2021-11-17 DIAGNOSIS — E11.69 MIXED DIABETIC HYPERLIPIDEMIA ASSOCIATED WITH TYPE 2 DIABETES MELLITUS: ICD-10-CM

## 2021-11-17 DIAGNOSIS — E11.8 CONTROLLED TYPE 2 DIABETES MELLITUS WITH COMPLICATION, WITH LONG-TERM CURRENT USE OF INSULIN: ICD-10-CM

## 2021-11-17 DIAGNOSIS — N18.30 HYPERTENSION ASSOCIATED WITH STAGE 3 CHRONIC KIDNEY DISEASE DUE TO TYPE 2 DIABETES MELLITUS: ICD-10-CM

## 2021-11-17 DIAGNOSIS — E55.9 VITAMIN D DEFICIENCY: ICD-10-CM

## 2021-11-17 DIAGNOSIS — I12.9 HYPERTENSION ASSOCIATED WITH STAGE 3 CHRONIC KIDNEY DISEASE DUE TO TYPE 2 DIABETES MELLITUS: ICD-10-CM

## 2021-11-17 DIAGNOSIS — E11.69 MIXED DIABETIC HYPERLIPIDEMIA ASSOCIATED WITH TYPE 2 DIABETES MELLITUS: Primary | ICD-10-CM

## 2021-11-17 DIAGNOSIS — E78.2 MIXED DIABETIC HYPERLIPIDEMIA ASSOCIATED WITH TYPE 2 DIABETES MELLITUS: Primary | ICD-10-CM

## 2021-11-17 DIAGNOSIS — E03.9 ACQUIRED HYPOTHYROIDISM: ICD-10-CM

## 2021-11-17 DIAGNOSIS — E11.22 HYPERTENSION ASSOCIATED WITH STAGE 3 CHRONIC KIDNEY DISEASE DUE TO TYPE 2 DIABETES MELLITUS: ICD-10-CM

## 2021-11-17 DIAGNOSIS — E83.42 HYPOMAGNESEMIA: ICD-10-CM

## 2021-11-17 DIAGNOSIS — E78.2 MIXED DIABETIC HYPERLIPIDEMIA ASSOCIATED WITH TYPE 2 DIABETES MELLITUS: ICD-10-CM

## 2021-11-17 DIAGNOSIS — Z79.4 CONTROLLED TYPE 2 DIABETES MELLITUS WITH COMPLICATION, WITH LONG-TERM CURRENT USE OF INSULIN: ICD-10-CM

## 2021-11-17 LAB
25(OH)D3+25(OH)D2 SERPL-MCNC: 42 NG/ML (ref 30–96)
ALBUMIN SERPL BCP-MCNC: 3.8 G/DL (ref 3.5–5.2)
ALP SERPL-CCNC: 154 U/L (ref 55–135)
ALT SERPL W/O P-5'-P-CCNC: 32 U/L (ref 10–44)
ANION GAP SERPL CALC-SCNC: 11 MMOL/L (ref 8–16)
ANION GAP SERPL CALC-SCNC: 11 MMOL/L (ref 8–16)
AST SERPL-CCNC: 28 U/L (ref 10–40)
BILIRUB SERPL-MCNC: 0.4 MG/DL (ref 0.1–1)
BUN SERPL-MCNC: 14 MG/DL (ref 8–23)
BUN SERPL-MCNC: 14 MG/DL (ref 8–23)
CALCIUM SERPL-MCNC: 9.6 MG/DL (ref 8.7–10.5)
CALCIUM SERPL-MCNC: 9.6 MG/DL (ref 8.7–10.5)
CHLORIDE SERPL-SCNC: 106 MMOL/L (ref 95–110)
CHLORIDE SERPL-SCNC: 106 MMOL/L (ref 95–110)
CO2 SERPL-SCNC: 26 MMOL/L (ref 23–29)
CO2 SERPL-SCNC: 26 MMOL/L (ref 23–29)
CREAT SERPL-MCNC: 0.9 MG/DL (ref 0.5–1.4)
CREAT SERPL-MCNC: 0.9 MG/DL (ref 0.5–1.4)
EST. GFR  (AFRICAN AMERICAN): >60 ML/MIN/1.73 M^2
EST. GFR  (AFRICAN AMERICAN): >60 ML/MIN/1.73 M^2
EST. GFR  (NON AFRICAN AMERICAN): >60 ML/MIN/1.73 M^2
EST. GFR  (NON AFRICAN AMERICAN): >60 ML/MIN/1.73 M^2
ESTIMATED AVG GLUCOSE: 174 MG/DL (ref 68–131)
ESTIMATED AVG GLUCOSE: 174 MG/DL (ref 68–131)
GLUCOSE SERPL-MCNC: 101 MG/DL (ref 70–110)
GLUCOSE SERPL-MCNC: 101 MG/DL (ref 70–110)
HBA1C MFR BLD: 7.7 % (ref 4–5.6)
HBA1C MFR BLD: 7.7 % (ref 4–5.6)
MAGNESIUM SERPL-MCNC: 1.5 MG/DL (ref 1.6–2.6)
PHOSPHATE SERPL-MCNC: 3.4 MG/DL (ref 2.7–4.5)
POTASSIUM SERPL-SCNC: 4.4 MMOL/L (ref 3.5–5.1)
POTASSIUM SERPL-SCNC: 4.4 MMOL/L (ref 3.5–5.1)
PROT SERPL-MCNC: 7.8 G/DL (ref 6–8.4)
PTH-INTACT SERPL-MCNC: 101.4 PG/ML (ref 9–77)
SODIUM SERPL-SCNC: 143 MMOL/L (ref 136–145)
SODIUM SERPL-SCNC: 143 MMOL/L (ref 136–145)
T4 FREE SERPL-MCNC: 0.96 NG/DL (ref 0.71–1.51)
TSH SERPL DL<=0.005 MIU/L-ACNC: 0.66 UIU/ML (ref 0.4–4)

## 2021-11-17 PROCEDURE — 99213 PR OFFICE/OUTPT VISIT, EST, LEVL III, 20-29 MIN: ICD-10-PCS | Mod: S$GLB,,, | Performed by: NURSE PRACTITIONER

## 2021-11-17 PROCEDURE — 36415 COLL VENOUS BLD VENIPUNCTURE: CPT | Mod: PO | Performed by: NURSE PRACTITIONER

## 2021-11-17 PROCEDURE — 83036 HEMOGLOBIN GLYCOSYLATED A1C: CPT | Performed by: NURSE PRACTITIONER

## 2021-11-17 PROCEDURE — 99499 UNLISTED E&M SERVICE: CPT | Mod: S$GLB,,, | Performed by: NURSE PRACTITIONER

## 2021-11-17 PROCEDURE — 99213 OFFICE O/P EST LOW 20 MIN: CPT | Mod: S$GLB,,, | Performed by: NURSE PRACTITIONER

## 2021-11-17 PROCEDURE — 3008F BODY MASS INDEX DOCD: CPT | Mod: CPTII,S$GLB,, | Performed by: NURSE PRACTITIONER

## 2021-11-17 PROCEDURE — 3075F PR MOST RECENT SYSTOLIC BLOOD PRESS GE 130-139MM HG: ICD-10-PCS | Mod: CPTII,S$GLB,, | Performed by: NURSE PRACTITIONER

## 2021-11-17 PROCEDURE — 3008F PR BODY MASS INDEX (BMI) DOCUMENTED: ICD-10-PCS | Mod: CPTII,S$GLB,, | Performed by: NURSE PRACTITIONER

## 2021-11-17 PROCEDURE — 3051F HG A1C>EQUAL 7.0%<8.0%: CPT | Mod: CPTII,S$GLB,, | Performed by: NURSE PRACTITIONER

## 2021-11-17 PROCEDURE — 1159F PR MEDICATION LIST DOCUMENTED IN MEDICAL RECORD: ICD-10-PCS | Mod: CPTII,S$GLB,, | Performed by: NURSE PRACTITIONER

## 2021-11-17 PROCEDURE — 3066F NEPHROPATHY DOC TX: CPT | Mod: CPTII,S$GLB,, | Performed by: NURSE PRACTITIONER

## 2021-11-17 PROCEDURE — 83970 ASSAY OF PARATHORMONE: CPT | Performed by: NURSE PRACTITIONER

## 2021-11-17 PROCEDURE — 4010F ACE/ARB THERAPY RXD/TAKEN: CPT | Mod: CPTII,S$GLB,, | Performed by: NURSE PRACTITIONER

## 2021-11-17 PROCEDURE — 3078F DIAST BP <80 MM HG: CPT | Mod: CPTII,S$GLB,, | Performed by: NURSE PRACTITIONER

## 2021-11-17 PROCEDURE — 84443 ASSAY THYROID STIM HORMONE: CPT | Performed by: NURSE PRACTITIONER

## 2021-11-17 PROCEDURE — 3075F SYST BP GE 130 - 139MM HG: CPT | Mod: CPTII,S$GLB,, | Performed by: NURSE PRACTITIONER

## 2021-11-17 PROCEDURE — 83735 ASSAY OF MAGNESIUM: CPT | Performed by: NURSE PRACTITIONER

## 2021-11-17 PROCEDURE — 84100 ASSAY OF PHOSPHORUS: CPT | Performed by: NURSE PRACTITIONER

## 2021-11-17 PROCEDURE — 82306 VITAMIN D 25 HYDROXY: CPT | Performed by: NURSE PRACTITIONER

## 2021-11-17 PROCEDURE — 1159F MED LIST DOCD IN RCRD: CPT | Mod: CPTII,S$GLB,, | Performed by: NURSE PRACTITIONER

## 2021-11-17 PROCEDURE — 80053 COMPREHEN METABOLIC PANEL: CPT | Performed by: NURSE PRACTITIONER

## 2021-11-17 PROCEDURE — 4010F PR ACE/ARB THEARPY RXD/TAKEN: ICD-10-PCS | Mod: CPTII,S$GLB,, | Performed by: NURSE PRACTITIONER

## 2021-11-17 PROCEDURE — 3078F PR MOST RECENT DIASTOLIC BLOOD PRESSURE < 80 MM HG: ICD-10-PCS | Mod: CPTII,S$GLB,, | Performed by: NURSE PRACTITIONER

## 2021-11-17 PROCEDURE — 84439 ASSAY OF FREE THYROXINE: CPT | Performed by: NURSE PRACTITIONER

## 2021-11-17 PROCEDURE — 3066F PR DOCUMENTATION OF TREATMENT FOR NEPHROPATHY: ICD-10-PCS | Mod: CPTII,S$GLB,, | Performed by: NURSE PRACTITIONER

## 2021-11-17 PROCEDURE — 99499 RISK ADDL DX/OHS AUDIT: ICD-10-PCS | Mod: S$GLB,,, | Performed by: NURSE PRACTITIONER

## 2021-11-17 PROCEDURE — 3051F PR MOST RECENT HEMOGLOBIN A1C LEVEL 7.0 - < 8.0%: ICD-10-PCS | Mod: CPTII,S$GLB,, | Performed by: NURSE PRACTITIONER

## 2021-11-23 ENCOUNTER — PES CALL (OUTPATIENT)
Dept: ADMINISTRATIVE | Facility: CLINIC | Age: 61
End: 2021-11-23
Payer: MEDICARE

## 2021-12-06 RX ORDER — INSULIN DEGLUDEC 200 U/ML
80 INJECTION, SOLUTION SUBCUTANEOUS DAILY
Qty: 12 PEN | Refills: 3 | Status: SHIPPED | OUTPATIENT
Start: 2021-12-06 | End: 2022-02-23 | Stop reason: SDUPTHER

## 2021-12-15 ENCOUNTER — TELEPHONE (OUTPATIENT)
Dept: TRANSPLANT | Facility: CLINIC | Age: 61
End: 2021-12-15
Payer: MEDICARE

## 2021-12-16 DIAGNOSIS — Z94.0 KIDNEY REPLACED BY TRANSPLANT: Primary | ICD-10-CM

## 2022-01-07 ENCOUNTER — PATIENT MESSAGE (OUTPATIENT)
Dept: SPORTS MEDICINE | Facility: CLINIC | Age: 62
End: 2022-01-07
Payer: MEDICARE

## 2022-01-14 ENCOUNTER — DOCUMENTATION ONLY (OUTPATIENT)
Dept: PRIMARY CARE CLINIC | Facility: CLINIC | Age: 62
End: 2022-01-14
Payer: MEDICARE

## 2022-01-19 ENCOUNTER — DOCUMENTATION ONLY (OUTPATIENT)
Dept: PRIMARY CARE CLINIC | Facility: CLINIC | Age: 62
End: 2022-01-19
Payer: MEDICARE

## 2022-01-20 ENCOUNTER — TELEPHONE (OUTPATIENT)
Dept: TRANSPLANT | Facility: CLINIC | Age: 62
End: 2022-01-20
Payer: MEDICARE

## 2022-01-20 DIAGNOSIS — Z94.0 KIDNEY REPLACED BY TRANSPLANT: Primary | ICD-10-CM

## 2022-01-25 ENCOUNTER — TELEPHONE (OUTPATIENT)
Dept: PRIMARY CARE CLINIC | Facility: CLINIC | Age: 62
End: 2022-01-25
Payer: MEDICARE

## 2022-01-31 ENCOUNTER — OFFICE VISIT (OUTPATIENT)
Dept: PRIMARY CARE CLINIC | Facility: CLINIC | Age: 62
End: 2022-01-31
Payer: MEDICARE

## 2022-01-31 DIAGNOSIS — R42 DIZZINESS: Primary | ICD-10-CM

## 2022-01-31 DIAGNOSIS — I12.9 HYPERTENSION ASSOCIATED WITH STAGE 3 CHRONIC KIDNEY DISEASE DUE TO TYPE 2 DIABETES MELLITUS: ICD-10-CM

## 2022-01-31 DIAGNOSIS — E11.22 HYPERTENSION ASSOCIATED WITH STAGE 3 CHRONIC KIDNEY DISEASE DUE TO TYPE 2 DIABETES MELLITUS: ICD-10-CM

## 2022-01-31 DIAGNOSIS — N18.30 HYPERTENSION ASSOCIATED WITH STAGE 3 CHRONIC KIDNEY DISEASE DUE TO TYPE 2 DIABETES MELLITUS: ICD-10-CM

## 2022-01-31 DIAGNOSIS — R42 DIZZINESS AND GIDDINESS: ICD-10-CM

## 2022-01-31 DIAGNOSIS — R42 DISEQUILIBRIUM: ICD-10-CM

## 2022-01-31 DIAGNOSIS — Z79.4 CONTROLLED TYPE 2 DIABETES MELLITUS WITH COMPLICATION, WITH LONG-TERM CURRENT USE OF INSULIN: ICD-10-CM

## 2022-01-31 DIAGNOSIS — E11.8 CONTROLLED TYPE 2 DIABETES MELLITUS WITH COMPLICATION, WITH LONG-TERM CURRENT USE OF INSULIN: ICD-10-CM

## 2022-01-31 PROCEDURE — 3008F BODY MASS INDEX DOCD: CPT | Mod: CPTII,S$GLB,, | Performed by: INTERNAL MEDICINE

## 2022-01-31 PROCEDURE — 1159F MED LIST DOCD IN RCRD: CPT | Mod: CPTII,S$GLB,, | Performed by: INTERNAL MEDICINE

## 2022-01-31 PROCEDURE — 4010F ACE/ARB THERAPY RXD/TAKEN: CPT | Mod: CPTII,S$GLB,, | Performed by: INTERNAL MEDICINE

## 2022-01-31 PROCEDURE — 3079F DIAST BP 80-89 MM HG: CPT | Mod: CPTII,S$GLB,, | Performed by: INTERNAL MEDICINE

## 2022-01-31 PROCEDURE — 3075F SYST BP GE 130 - 139MM HG: CPT | Mod: CPTII,S$GLB,, | Performed by: INTERNAL MEDICINE

## 2022-01-31 PROCEDURE — 99214 OFFICE O/P EST MOD 30 MIN: CPT | Mod: S$GLB,,, | Performed by: INTERNAL MEDICINE

## 2022-01-31 PROCEDURE — G0008 FLU VACCINE (QUAD) GREATER THAN OR EQUAL TO 3YO PRESERVATIVE FREE IM: ICD-10-PCS | Mod: S$GLB,,, | Performed by: INTERNAL MEDICINE

## 2022-01-31 PROCEDURE — 3008F PR BODY MASS INDEX (BMI) DOCUMENTED: ICD-10-PCS | Mod: CPTII,S$GLB,, | Performed by: INTERNAL MEDICINE

## 2022-01-31 PROCEDURE — 90686 FLU VACCINE (QUAD) GREATER THAN OR EQUAL TO 3YO PRESERVATIVE FREE IM: ICD-10-PCS | Mod: S$GLB,,, | Performed by: INTERNAL MEDICINE

## 2022-01-31 PROCEDURE — 3051F PR MOST RECENT HEMOGLOBIN A1C LEVEL 7.0 - < 8.0%: ICD-10-PCS | Mod: CPTII,S$GLB,, | Performed by: INTERNAL MEDICINE

## 2022-01-31 PROCEDURE — 3075F PR MOST RECENT SYSTOLIC BLOOD PRESS GE 130-139MM HG: ICD-10-PCS | Mod: CPTII,S$GLB,, | Performed by: INTERNAL MEDICINE

## 2022-01-31 PROCEDURE — 90686 IIV4 VACC NO PRSV 0.5 ML IM: CPT | Mod: S$GLB,,, | Performed by: INTERNAL MEDICINE

## 2022-01-31 PROCEDURE — 4010F PR ACE/ARB THEARPY RXD/TAKEN: ICD-10-PCS | Mod: CPTII,S$GLB,, | Performed by: INTERNAL MEDICINE

## 2022-01-31 PROCEDURE — 3079F PR MOST RECENT DIASTOLIC BLOOD PRESSURE 80-89 MM HG: ICD-10-PCS | Mod: CPTII,S$GLB,, | Performed by: INTERNAL MEDICINE

## 2022-01-31 PROCEDURE — 99214 PR OFFICE/OUTPT VISIT, EST, LEVL IV, 30-39 MIN: ICD-10-PCS | Mod: S$GLB,,, | Performed by: INTERNAL MEDICINE

## 2022-01-31 PROCEDURE — 3051F HG A1C>EQUAL 7.0%<8.0%: CPT | Mod: CPTII,S$GLB,, | Performed by: INTERNAL MEDICINE

## 2022-01-31 PROCEDURE — G0008 ADMIN INFLUENZA VIRUS VAC: HCPCS | Mod: S$GLB,,, | Performed by: INTERNAL MEDICINE

## 2022-01-31 PROCEDURE — 1159F PR MEDICATION LIST DOCUMENTED IN MEDICAL RECORD: ICD-10-PCS | Mod: CPTII,S$GLB,, | Performed by: INTERNAL MEDICINE

## 2022-01-31 NOTE — PATIENT INSTRUCTIONS
Get MRI of the Brain     Referral to Neurology     Be mindful about diabetes diet - we will check with you in 2 weeks     [phone] is the number for [facility] 63+, will be open in May but can be seen in [facility] before then if needed, please ask for Dr. Vaughn or Dr. Rushing

## 2022-02-01 ENCOUNTER — DOCUMENTATION ONLY (OUTPATIENT)
Dept: PRIMARY CARE CLINIC | Facility: CLINIC | Age: 62
End: 2022-02-01
Payer: MEDICARE

## 2022-02-01 VITALS
HEART RATE: 84 BPM | HEIGHT: 67 IN | DIASTOLIC BLOOD PRESSURE: 80 MMHG | OXYGEN SATURATION: 97 % | WEIGHT: 225.75 LBS | SYSTOLIC BLOOD PRESSURE: 130 MMHG | BODY MASS INDEX: 35.43 KG/M2 | TEMPERATURE: 99 F

## 2022-02-01 NOTE — ASSESSMENT & PLAN NOTE
Currently uncontrolled   - Presented patient with options of increasing insulin or diet adherence. Patient decided to try modifying diet before increasing medications .   -Will follow up in 2 weeks time.

## 2022-02-01 NOTE — ASSESSMENT & PLAN NOTE
Most concerning for prior stroke that may be affecting the cerebellar region although pt has had multiple MRI for similar presentation before, pt out of window for therapy  Other causes could be polypharmacy ( no new medications) no symptoms that would suggest sinusitis, does not follow pattern of arrhythmias, other neurological conditions such as multiple sclerosis but no finding on prior studies to support this.   - - Referral to Neurology  and Get MRI of the Brain

## 2022-02-02 DIAGNOSIS — E11.9 TYPE 2 DIABETES MELLITUS WITHOUT COMPLICATION, UNSPECIFIED WHETHER LONG TERM INSULIN USE: ICD-10-CM

## 2022-02-07 ENCOUNTER — LAB VISIT (OUTPATIENT)
Dept: LAB | Facility: HOSPITAL | Age: 62
End: 2022-02-07
Attending: INTERNAL MEDICINE
Payer: MEDICARE

## 2022-02-07 DIAGNOSIS — Z94.0 KIDNEY REPLACED BY TRANSPLANT: ICD-10-CM

## 2022-02-07 LAB
ALBUMIN SERPL BCP-MCNC: 3.6 G/DL (ref 3.5–5.2)
ALBUMIN SERPL BCP-MCNC: 3.6 G/DL (ref 3.5–5.2)
ALP SERPL-CCNC: 177 U/L (ref 55–135)
ALT SERPL W/O P-5'-P-CCNC: 47 U/L (ref 10–44)
ANION GAP SERPL CALC-SCNC: 10 MMOL/L (ref 8–16)
AST SERPL-CCNC: 33 U/L (ref 10–40)
BASOPHILS # BLD AUTO: 0.05 K/UL (ref 0–0.2)
BASOPHILS NFR BLD: 0.8 % (ref 0–1.9)
BILIRUB DIRECT SERPL-MCNC: 0.1 MG/DL (ref 0.1–0.3)
BILIRUB SERPL-MCNC: 0.3 MG/DL (ref 0.1–1)
BUN SERPL-MCNC: 17 MG/DL (ref 8–23)
CALCIUM SERPL-MCNC: 9.5 MG/DL (ref 8.7–10.5)
CHLORIDE SERPL-SCNC: 106 MMOL/L (ref 95–110)
CO2 SERPL-SCNC: 25 MMOL/L (ref 23–29)
CREAT SERPL-MCNC: 1 MG/DL (ref 0.5–1.4)
DIFFERENTIAL METHOD: ABNORMAL
EOSINOPHIL # BLD AUTO: 0.7 K/UL (ref 0–0.5)
EOSINOPHIL NFR BLD: 10.6 % (ref 0–8)
ERYTHROCYTE [DISTWIDTH] IN BLOOD BY AUTOMATED COUNT: 14.8 % (ref 11.5–14.5)
EST. GFR  (AFRICAN AMERICAN): >60 ML/MIN/1.73 M^2
EST. GFR  (NON AFRICAN AMERICAN): >60 ML/MIN/1.73 M^2
GLUCOSE SERPL-MCNC: 194 MG/DL (ref 70–110)
HCT VFR BLD AUTO: 36 % (ref 37–48.5)
HGB BLD-MCNC: 11.3 G/DL (ref 12–16)
IMM GRANULOCYTES # BLD AUTO: 0.03 K/UL (ref 0–0.04)
IMM GRANULOCYTES NFR BLD AUTO: 0.5 % (ref 0–0.5)
LYMPHOCYTES # BLD AUTO: 1.2 K/UL (ref 1–4.8)
LYMPHOCYTES NFR BLD: 17.8 % (ref 18–48)
MAGNESIUM SERPL-MCNC: 1.6 MG/DL (ref 1.6–2.6)
MCH RBC QN AUTO: 28.5 PG (ref 27–31)
MCHC RBC AUTO-ENTMCNC: 31.4 G/DL (ref 32–36)
MCV RBC AUTO: 91 FL (ref 82–98)
MONOCYTES # BLD AUTO: 0.5 K/UL (ref 0.3–1)
MONOCYTES NFR BLD: 7.3 % (ref 4–15)
NEUTROPHILS # BLD AUTO: 4.2 K/UL (ref 1.8–7.7)
NEUTROPHILS NFR BLD: 63 % (ref 38–73)
NRBC BLD-RTO: 0 /100 WBC
PHOSPHATE SERPL-MCNC: 3.6 MG/DL (ref 2.7–4.5)
PLATELET # BLD AUTO: 228 K/UL (ref 150–450)
PMV BLD AUTO: 10.1 FL (ref 9.2–12.9)
POTASSIUM SERPL-SCNC: 4.4 MMOL/L (ref 3.5–5.1)
PROT SERPL-MCNC: 7.2 G/DL (ref 6–8.4)
RBC # BLD AUTO: 3.96 M/UL (ref 4–5.4)
SODIUM SERPL-SCNC: 141 MMOL/L (ref 136–145)
WBC # BLD AUTO: 6.58 K/UL (ref 3.9–12.7)

## 2022-02-07 PROCEDURE — 80069 RENAL FUNCTION PANEL: CPT | Performed by: INTERNAL MEDICINE

## 2022-02-07 PROCEDURE — 83735 ASSAY OF MAGNESIUM: CPT | Performed by: INTERNAL MEDICINE

## 2022-02-07 PROCEDURE — 87799 DETECT AGENT NOS DNA QUANT: CPT | Performed by: INTERNAL MEDICINE

## 2022-02-07 PROCEDURE — 84075 ASSAY ALKALINE PHOSPHATASE: CPT | Performed by: INTERNAL MEDICINE

## 2022-02-07 PROCEDURE — 80197 ASSAY OF TACROLIMUS: CPT | Performed by: INTERNAL MEDICINE

## 2022-02-07 PROCEDURE — 87799 DETECT AGENT NOS DNA QUANT: CPT | Mod: 91 | Performed by: INTERNAL MEDICINE

## 2022-02-07 PROCEDURE — 36415 COLL VENOUS BLD VENIPUNCTURE: CPT | Mod: PO | Performed by: INTERNAL MEDICINE

## 2022-02-07 PROCEDURE — 85025 COMPLETE CBC W/AUTO DIFF WBC: CPT | Performed by: INTERNAL MEDICINE

## 2022-02-08 ENCOUNTER — TELEPHONE (OUTPATIENT)
Dept: TRANSPLANT | Facility: CLINIC | Age: 62
End: 2022-02-08
Payer: MEDICARE

## 2022-02-08 DIAGNOSIS — Z94.0 KIDNEY REPLACED BY TRANSPLANT: Primary | ICD-10-CM

## 2022-02-08 DIAGNOSIS — R74.01 TRANSAMINITIS: ICD-10-CM

## 2022-02-08 LAB — TACROLIMUS BLD-MCNC: 6 NG/ML (ref 5–15)

## 2022-02-08 NOTE — TELEPHONE ENCOUNTER
Patient repeated back and voice a understanding of orders.  Requesting a Hepatology consult in [facility].    ----- Message from Maureen Cabral MD sent at 2/8/2022  1:19 PM CST -----  Should see hepatology about transaminitis

## 2022-02-11 LAB
B19V DNA # SPEC NAA+PROBE: <100 COPIES/ML
SPECIMEN SOURCE: NORMAL

## 2022-02-14 ENCOUNTER — OFFICE VISIT (OUTPATIENT)
Dept: TRANSPLANT | Facility: CLINIC | Age: 62
End: 2022-02-14
Payer: MEDICARE

## 2022-02-14 ENCOUNTER — OFFICE VISIT (OUTPATIENT)
Dept: PRIMARY CARE CLINIC | Facility: CLINIC | Age: 62
End: 2022-02-14
Payer: MEDICARE

## 2022-02-14 VITALS
TEMPERATURE: 97 F | SYSTOLIC BLOOD PRESSURE: 175 MMHG | HEIGHT: 67 IN | HEART RATE: 67 BPM | RESPIRATION RATE: 17 BRPM | BODY MASS INDEX: 35.43 KG/M2 | DIASTOLIC BLOOD PRESSURE: 79 MMHG | OXYGEN SATURATION: 95 % | WEIGHT: 225.75 LBS

## 2022-02-14 VITALS
DIASTOLIC BLOOD PRESSURE: 86 MMHG | BODY MASS INDEX: 35.84 KG/M2 | HEIGHT: 67 IN | SYSTOLIC BLOOD PRESSURE: 132 MMHG | OXYGEN SATURATION: 96 % | HEART RATE: 72 BPM | WEIGHT: 228.38 LBS | TEMPERATURE: 98 F

## 2022-02-14 DIAGNOSIS — I15.1 HYPERTENSION SECONDARY TO OTHER RENAL DISORDERS: ICD-10-CM

## 2022-02-14 DIAGNOSIS — B34.3 PARVOVIRUS B19 INFECTION: ICD-10-CM

## 2022-02-14 DIAGNOSIS — Z94.0 KIDNEY REPLACED BY TRANSPLANT: ICD-10-CM

## 2022-02-14 DIAGNOSIS — I12.9 RENAL HYPERTENSION: ICD-10-CM

## 2022-02-14 DIAGNOSIS — E11.8 CONTROLLED TYPE 2 DIABETES MELLITUS WITH COMPLICATION, WITH LONG-TERM CURRENT USE OF INSULIN: Primary | ICD-10-CM

## 2022-02-14 DIAGNOSIS — Z79.899 IMMUNOSUPPRESSIVE MANAGEMENT ENCOUNTER FOLLOWING KIDNEY TRANSPLANT: ICD-10-CM

## 2022-02-14 DIAGNOSIS — N18.2 CHRONIC KIDNEY DISEASE (CKD), STAGE II (MILD): Primary | ICD-10-CM

## 2022-02-14 DIAGNOSIS — R42 DISEQUILIBRIUM: ICD-10-CM

## 2022-02-14 DIAGNOSIS — Z94.0 IMMUNOSUPPRESSIVE MANAGEMENT ENCOUNTER FOLLOWING KIDNEY TRANSPLANT: ICD-10-CM

## 2022-02-14 DIAGNOSIS — Z79.4 CONTROLLED TYPE 2 DIABETES MELLITUS WITH COMPLICATION, WITH LONG-TERM CURRENT USE OF INSULIN: Primary | ICD-10-CM

## 2022-02-14 DIAGNOSIS — G44.52 NEW DAILY PERSISTENT HEADACHE: ICD-10-CM

## 2022-02-14 PROCEDURE — 1159F MED LIST DOCD IN RCRD: CPT | Mod: CPTII,S$GLB,, | Performed by: INTERNAL MEDICINE

## 2022-02-14 PROCEDURE — 1160F RVW MEDS BY RX/DR IN RCRD: CPT | Mod: CPTII,S$GLB,, | Performed by: INTERNAL MEDICINE

## 2022-02-14 PROCEDURE — 99215 OFFICE O/P EST HI 40 MIN: CPT | Mod: S$GLB,,, | Performed by: INTERNAL MEDICINE

## 2022-02-14 PROCEDURE — 4010F PR ACE/ARB THEARPY RXD/TAKEN: ICD-10-PCS | Mod: CPTII,S$GLB,, | Performed by: INTERNAL MEDICINE

## 2022-02-14 PROCEDURE — 3008F BODY MASS INDEX DOCD: CPT | Mod: CPTII,S$GLB,, | Performed by: INTERNAL MEDICINE

## 2022-02-14 PROCEDURE — 3066F NEPHROPATHY DOC TX: CPT | Mod: CPTII,S$GLB,, | Performed by: INTERNAL MEDICINE

## 2022-02-14 PROCEDURE — 3066F PR DOCUMENTATION OF TREATMENT FOR NEPHROPATHY: ICD-10-PCS | Mod: CPTII,S$GLB,, | Performed by: INTERNAL MEDICINE

## 2022-02-14 PROCEDURE — 4010F ACE/ARB THERAPY RXD/TAKEN: CPT | Mod: CPTII,S$GLB,, | Performed by: INTERNAL MEDICINE

## 2022-02-14 PROCEDURE — 3079F DIAST BP 80-89 MM HG: CPT | Mod: CPTII,S$GLB,, | Performed by: INTERNAL MEDICINE

## 2022-02-14 PROCEDURE — 3078F PR MOST RECENT DIASTOLIC BLOOD PRESSURE < 80 MM HG: ICD-10-PCS | Mod: CPTII,S$GLB,, | Performed by: INTERNAL MEDICINE

## 2022-02-14 PROCEDURE — 3075F SYST BP GE 130 - 139MM HG: CPT | Mod: CPTII,S$GLB,, | Performed by: INTERNAL MEDICINE

## 2022-02-14 PROCEDURE — 3051F PR MOST RECENT HEMOGLOBIN A1C LEVEL 7.0 - < 8.0%: ICD-10-PCS | Mod: CPTII,S$GLB,, | Performed by: INTERNAL MEDICINE

## 2022-02-14 PROCEDURE — 99214 OFFICE O/P EST MOD 30 MIN: CPT | Mod: S$GLB,,, | Performed by: INTERNAL MEDICINE

## 2022-02-14 PROCEDURE — 99214 PR OFFICE/OUTPT VISIT, EST, LEVL IV, 30-39 MIN: ICD-10-PCS | Mod: S$GLB,,, | Performed by: INTERNAL MEDICINE

## 2022-02-14 PROCEDURE — 99999 PR PBB SHADOW E&M-EST. PATIENT-LVL V: CPT | Mod: PBBFAC,,, | Performed by: INTERNAL MEDICINE

## 2022-02-14 PROCEDURE — 99999 PR PBB SHADOW E&M-EST. PATIENT-LVL V: ICD-10-PCS | Mod: PBBFAC,,, | Performed by: INTERNAL MEDICINE

## 2022-02-14 PROCEDURE — 1160F PR REVIEW ALL MEDS BY PRESCRIBER/CLIN PHARMACIST DOCUMENTED: ICD-10-PCS | Mod: CPTII,S$GLB,, | Performed by: INTERNAL MEDICINE

## 2022-02-14 PROCEDURE — 3077F SYST BP >= 140 MM HG: CPT | Mod: CPTII,S$GLB,, | Performed by: INTERNAL MEDICINE

## 2022-02-14 PROCEDURE — 3051F HG A1C>EQUAL 7.0%<8.0%: CPT | Mod: CPTII,S$GLB,, | Performed by: INTERNAL MEDICINE

## 2022-02-14 PROCEDURE — 3075F PR MOST RECENT SYSTOLIC BLOOD PRESS GE 130-139MM HG: ICD-10-PCS | Mod: CPTII,S$GLB,, | Performed by: INTERNAL MEDICINE

## 2022-02-14 PROCEDURE — 99215 PR OFFICE/OUTPT VISIT, EST, LEVL V, 40-54 MIN: ICD-10-PCS | Mod: S$GLB,,, | Performed by: INTERNAL MEDICINE

## 2022-02-14 PROCEDURE — 1159F PR MEDICATION LIST DOCUMENTED IN MEDICAL RECORD: ICD-10-PCS | Mod: CPTII,S$GLB,, | Performed by: INTERNAL MEDICINE

## 2022-02-14 PROCEDURE — 3077F PR MOST RECENT SYSTOLIC BLOOD PRESSURE >= 140 MM HG: ICD-10-PCS | Mod: CPTII,S$GLB,, | Performed by: INTERNAL MEDICINE

## 2022-02-14 PROCEDURE — 3079F PR MOST RECENT DIASTOLIC BLOOD PRESSURE 80-89 MM HG: ICD-10-PCS | Mod: CPTII,S$GLB,, | Performed by: INTERNAL MEDICINE

## 2022-02-14 PROCEDURE — 3078F DIAST BP <80 MM HG: CPT | Mod: CPTII,S$GLB,, | Performed by: INTERNAL MEDICINE

## 2022-02-14 PROCEDURE — 3008F PR BODY MASS INDEX (BMI) DOCUMENTED: ICD-10-PCS | Mod: CPTII,S$GLB,, | Performed by: INTERNAL MEDICINE

## 2022-02-14 RX ORDER — TACROLIMUS 1 MG/1
1 CAPSULE ORAL EVERY 12 HOURS
Qty: 60 CAPSULE | Refills: 11 | Status: SHIPPED | OUTPATIENT
Start: 2022-02-14 | End: 2023-01-11 | Stop reason: SDUPTHER

## 2022-02-14 NOTE — LETTER
February 14, 2022        Dalton Heaton  [address]  [facility] NEPHROLOGY [place] [address]  Phone: [phone]  Fax: [phone]             [facility] 1st Fl  [address]  [facility] [address]  Phone: [phone]   Patient: Andra Newell   MR Number: [id number]   YOB: 1960   Date of Visit: 2/14/2022       Dear Dr. Dalton Heaton    Thank you for referring Andra Newell to me for evaluation. Attached you will find relevant portions of my assessment and plan of care.    If you have questions, please do not hesitate to call me. I look forward to following Andra Newell along with you.    Sincerely,    Maureen Cabral MD    Enclosure    If you would like to receive this communication electronically, please contact [email or web address] or [phone] to request [employer] Link access.    [employer] Link is a tool which provides read-only access to select patient information with whom you have a relationship. Its easy to use and provides real time access to review your patients record including encounter summaries, notes, results, and demographic information.    If you feel you have received this communication in error or would no longer like to receive these types of communications, please e-mail [email or web address]

## 2022-02-14 NOTE — ASSESSMENT & PLAN NOTE
Recently had MRI Brain at outside facility - requesting results  - recommend follow up with eye clinic with vision changes.  May be related to her neck/back pain; will f/u after she receives fentanyl injection this Wednesday to see if pain and headaches have subsided.  May also improve upon control of T2DM.

## 2022-02-14 NOTE — PATIENT INSTRUCTIONS
Will call you once results received concerning MRI Brain     Please make eye appt followup     Monitor response with headache with back injection     Split your tresiba into am and pm dosing with 40 units for the next 3 days, if you still are seeing 200s then increase your tresiba to 45 units twice a day.     Continue novolog as you are doing.

## 2022-02-14 NOTE — PATIENT INSTRUCTIONS
-COVID VACCINE: CDC GUIDELINES FOR IMMUNOSUPPRESSED PATIENTS:    Get the mRNA vaccine first and second dose, a 3rd primary vaccine 28 days after the second shot, then a booster 3-5 months later. 4 shots in total.     The CDC recommends an extra primary shot a months after the second, since persons with suppressed immune systems tend to respond less effectively to all vaccines   Continue good handwashing, avoiding sick contacts, and wearing a snug fitting mask [preferably KN95 or N95]    Please get your covid vaccine and other recommended vaccines, as patients with kidney disease and post transplant are at much higher risk of complications from all infections than from the vaccine. We want you to do everything in your power to have the best outcome after transplant and prevent avoidable complications.    Congratulations on reaching another anniversary after transplant! I expect you will have many more!    -You will need a team to care for you the best way possible! For your health, you should follow with your general nephrologist, primary care provider/PCP, dentist and eye doctor, and GYN (for ladies) for routine/preventative care. If you are a diabetic, you should see a podiatrist for regular foot checks. If you need help establishing with one, let us know.    -Your transplant team will renew your immunosuppressant and transplant related medications at your annual visits, and we will defer the rest to your other providers.    -Remember to keep transplant posted about medication changes or new developments in your health. These may warrant changing your immunosuppression. We are not automatically notified of changes in your condition, so please call or message us with any updates.    -Don't forget we have pharmacist, dietician and social workers that are able to help you, at your request.    -We recommend you stay up to date with vaccines - your primary care provider will help with this, since vaccine recommendations  vary and get updated. You should get a yearly influenza vaccine. It does not protect you against all infections, and you can still get the flu. It is proven to protect transplant patients from influenza related serious    SAFE OVER THE COUNTER REMEDIES  Acne  - Clean affected areas with Panoxyl foaming wash [or any other wash containing 10% benzoyl peroxide] - follow direction on package insert and beware it can bleach fabrics and hair.  - You may also try Differin [adapalene gel  0.1%] to the affected areas as per package insert as well.  - A good moisturizer may be needed if skin dries out. Cetaphil and Cerave make ones often recommended by dermatologists.  - Don't forget to protect your skin from the sun, since you are at increased risk of skin cancer.    Fever or pain   - Tylenol (acetaminophen).   - For pain you can try salon pas or lidocaine patches to be applied directly to area of pain.   - Diclofenac [Voltaren] gel is safe to use for a few weeks.    - We do not recommend NSAIDS  by mouth such as Motrin, Advil, Aleve, naprosen, BC powders, etc. If in doubt, please check!    Cough Suppressant - Dextromethorphan Hydrobromide 30 mg or cough drops (Halls or equivalent generic)    Nasal congestion   - Saline nasal spray or Afrin nasal spray.   - Topical vicks vaporub or nasal inhaler is also acceptable.  - (Afrin is oxymetazoline, but should ONLY USE FOR 3 DAYS).   - Note tablet form decongestants (Sudafed, phenylephrine) can raise blood pressure and are not recommended    Allergy symptoms - Antihistamine (Benadryl, Claritin, Zyrtec). These can also help dry up drainage.    For sore throat -  salt water gargles, Chloraseptic spray or sore throat lozenges     For thick secretions (thick mucous) - Guaifenesin (Mucinex), saline nasal spray     To prevent colds - low dose vitamin C is probably OK, but can increase risk of kidney stones. Zinc lozenges (not tablets) taken through the day may help minimize. Let the  lozenge dissolve in the back of your throat. Note: Zinc can inhibit the virus from multiplying in your throat, but it is not proven to prevent colds.    Indigestion  - famotidine (Pepcid) 20 mg daily is generally safe. Tums can cause high calcium and should be taken only after taking to your provider about your calcium levels. High calcium can hurt the kidney. Avoid cimetidine (Tagamet) as it can interfere with your immunosuppression and cause toxic levels. A few doses of pepto bismol should be fine. Continued indigestion should be evaluated by your provider.    Nausea or vomiting - these symptoms can indicate there is something wrong with your stomach and should be evaluated if they last more than 6-8 hours OR anytime you cannot keep your medicines down. Not being able to take your medicine can cause complications.     Diarrhea -  Pepto-Bismol, even Metamucil can help liquid stools. Diarrhea can be a sign of infection, so please let your provider know if it continues past 1-2 days for full evaluation. Avoid Imodium unless you have spoken to a physician.    Constipation- Colace, Dulcolax, MiraLax are safe to take regardless of your kidney function.  Please check with the physician before using magnesium or phosphorus containing supplements such as magnesium citrate, milk of magnesium, or Fleet's Phospho-Soda.  These remedies may cause harm, depending on the degree of kidney insufficiency you have.  Your doctor can advise if year magnesium and phosphorus levels are low enough to take these.    Herbal and natural remedies - Some herbals are very safe and effective while others are proven to cause renal failure or interact with your medication. Do NOT take any natural or herbal remedies without discussing with transplant.    If you develop fever or shortness of breath, or start to feel worse, you need to get checked out again or go to ED, depending on the severity of your symptoms.    As always, feel free to ask any  questions and raise any concerns regarding your health care.    Best Wishes,  Dr. Shirley Cabral and your entire transplant team

## 2022-02-14 NOTE — PROGRESS NOTES
Primary Care Provider Appointment - [facility]  SHARED NOTE: Katy Keys (M4), Dr. Blevins (Attending)    Subjective:      Patient ID: Andra Newell is a 62 y.o. female with HTN, HLD, T2DM c/b ESRD s/p living L kidney transplant 1/14/2019, On immunosupressant, DJD, Asthma, hypothyroidism, HPT.       Chief Complaint: Follow-up (headaches)    Prior to this visit, patient's last encounter with PCP was 1/31/2022.    Disequilibrium-   MRI was done outside of [facility], patient brought in disc with images.  Still occurs at least 75% of the time when patient stands from sitting. Patient feels off-balanced and takes about 20 seconds to regain balance. Denies experiencing falls due to the disequilibrium.  Has appointment scheduled with neurology at the end of March.    Has still been experiencing headaches 4-5 times since her last visit two weeks ago. Headaches occur at the top of her head, a sharp shooting pain. Last about a minute and subside when patient puts pressure on the top of her head with her hand. Has not noticed any patterns with what causes headaches to begin. Has not noticed any other symptoms that occur at the same time as the headaches. Has had blurry vision for the past 6 months but believes this is related to her diabetes.    Patient is receiving a fentanyl shot for her back pain this Wednesday 2/16/22.     T2DM -   Patient's blood sugar was elevated at last visit. Wanted to try diet modifications before increasing medication. Has made significant adjustments, but glucose logs have remained in the 250s. Patient feels we need to increase medications at this time. Has been taking 80 units of Tresiba in the morning, and 30/35/35 units of the Novolog.    Hepatic function -   Patient's nephrologist recommended f/u on LFTs. Labs 2/7/22 had elevated ALT (47) and alkaline phosphatase (177). Patients LFTs have been abnormal in the past. Occasionally has pain in the RUQ but not very often. Plans to make  "appointment with hepatologist.    Health maintenance -   Scheduled for foot and eye exams.  Could not receive Covid booster at last visit, because received flu vaccine at that time. Plans to get the booster soon.      Past Surgical History:   Procedure Laterality Date    ACHILLES TENDON SURGERY Left May 2015    BACK SURGERY      CERVICAL SPINE SURGERY  2021    cervical gate placed by Dr. Vera- asim shaw, and "gate"    CHOLECYSTECTOMY      COLONOSCOPY N/A 9/6/2017    Procedure: COLONOSCOPY;  Surgeon: Marisol Bryson MD;  Location: [facility];  Service: Endoscopy;  Laterality: N/A; REPEAT IN 3 YEARS FOR SURVEILLANCE    COLONOSCOPY N/A 5/9/2019    Procedure: COLONOSCOPY;  Surgeon: Fady Ewing MD;  Location: [facility] ENDO (2ND FLR);  Service: Endoscopy;  Laterality: N/A;    DIALYSIS FISTULA CREATION  12/2017    ESOPHAGOGASTRODUODENOSCOPY N/A 5/9/2019    Procedure: EGD (ESOPHAGOGASTRODUODENOSCOPY);  Surgeon: Fady Ewing MD;  Location: [facility] (2ND FLR);  Service: Endoscopy;  Laterality: N/A;    ESOPHAGOGASTRODUODENOSCOPY N/A 6/10/2021    Procedure: EGD (ESOPHAGOGASTRODUODENOSCOPY);  Surgeon: Marisol Bryson MD;  Location: [facility];  Service: Endoscopy;  Laterality: N/A;    HEMORRHOID SURGERY      INJECTION OF ANESTHETIC AGENT AROUND MEDIAL BRANCH NERVES INNERVATING LUMBAR FACET JOINT Right 9/25/2019    Procedure: Block-nerve-medial branch-lumbar;  Surgeon: Yonny Ferrer MD;  Location: [facility];  Service: Pain Management;  Laterality: Right;  L2, 3, 4,5     INJECTION OF ANESTHETIC AGENT AROUND MEDIAL BRANCH NERVES INNERVATING LUMBAR FACET JOINT Right 10/16/2019    Procedure: Block-nerve-medial branch-lumbar;  Surgeon: Yonny Ferrer MD;  [facility] OR;  Service: Pain Management;  Laterality: Right;  L2, 3, 4,5     JOINT REPLACEMENT      left    KIDNEY TRANSPLANT N/A 1/14/2019    Procedure: TRANSPLANT, KIDNEY;  Surgeon: Roland Salazar MD;  Location: [facility] 2ND FLR;  Service: Transplant;  " Laterality: N/A;    KNEE SURGERY      RADIOFREQUENCY ABLATION OF LUMBAR MEDIAL BRANCH NERVE AT SINGLE LEVEL Right 10/29/2019    Procedure: Radiofrequency Ablation, Nerve, Spinal, Lumbar, Medial Branch, 1 Level;  Surgeon: Yonny Ferrer MD;  Location: [facility];  Service: Pain Management;  Laterality: Right;  L2, 3, 4, 5  burned at 80 degrees C X 60 seconds each site X 2    SHOULDER ARTHROSCOPY      SHOULDER SURGERY      UPPER GASTROINTESTINAL ENDOSCOPY  ~2013    Dr. Moralez       Past Medical History:   Diagnosis Date    Anemia     Anemia in chronic kidney disease, on chronic dialysis 7/12/2017    Anxiety and depression     Aplastic anemia with parvovirus B19 infection 5/27/2019    Arthritis     Asthma     allergic airway    CKD stage III (moderate) 2/18/2019    Colon polyp     Depression     Diabetes mellitus     Diverticulosis     Encounter for blood transfusion     Eosinophilia     ESRD (end stage renal disease)     stage V, due for living donor 9/2018    ESRD on dialysis     GERD (gastroesophageal reflux disease)     Gout     Gout     Hyperlipidemia     Hypertension     Hypertriglyceridemia without hypercholesterolemia 6/9/2019    Low back pain     Low HDL (under 40) 6/9/2019    MRSA carrier     Neutropenia, unspecified 5/8/2019    Obesity     Renal disorder     Rotator cuff syndrome--left     Thyroid disease     Ulnar neuropathy of right upper extremity     Uncontrolled type 2 diabetes mellitus with hyperglycemia        Social History     Socioeconomic History    Marital status: Significant Other     Spouse name: Any Alvarez    Number of children: 2    Highest education level: 12th grade   Occupational History    Occupation: retired     Comment:    Tobacco Use    Smoking status: Never Smoker    Smokeless tobacco: Never Used   Substance and Sexual Activity    Alcohol use: No     Alcohol/week: 0.0 standard drinks    Drug use: No    Sexual activity:  Not Currently   Social History Narrative     female    Disabled since 2015 due to DDD and severe osteoarthritis of knee and hips    Previously worked as  at [employer]    Lives in residential 1 story home with partner, Any.     Has 2 boys, 1 lives in [place], 1 in [place].         Faith: Yarsani    Hobbies: painting and crafts.       Social Determinants of Health     Financial Resource Strain: Low Risk     Difficulty of Paying Living Expenses: Not hard at all   Food Insecurity: No Food Insecurity    Worried About Running Out of Food in the Last Year: Never true    Ran Out of Food in the Last Year: Never true   Transportation Needs: No Transportation Needs    Lack of Transportation (Medical): No    Lack of Transportation (Non-Medical): No   Physical Activity: Inactive    Days of Exercise per Week: 0 days    Minutes of Exercise per Session: 0 min   Stress: Stress Concern Present    Feeling of Stress : To some extent   Social Connections: Moderately Isolated    Frequency of Communication with Friends and Family: Twice a week    Frequency of Social Gatherings with Friends and Family: Twice a week    Attends Scientologist Services: Never    Active Member of Clubs or Organizations: No    Attends Club or Organization Meetings: Never    Marital Status: Living with partner   Housing Stability: Low Risk     Unable to Pay for Housing in the Last Year: No    Number of Places Lived in the Last Year: 1    Unstable Housing in the Last Year: No       Review of Systems   Constitutional: Negative.    HENT: Negative.    Eyes: Positive for visual disturbance (Blurry vision). Negative for photophobia, pain, discharge, redness and itching.   Respiratory: Negative.    Cardiovascular: Negative.    Gastrointestinal: Positive for abdominal pain (Rarely, in RUQ.). Negative for abdominal distention, anal bleeding, blood in stool, constipation, diarrhea, nausea, rectal pain and vomiting.  "  Endocrine: Negative.    Genitourinary: Negative.    Musculoskeletal: Positive for back pain and gait problem (Disequilibrium upon standing.). Negative for arthralgias, joint swelling, myalgias, neck pain and neck stiffness.   Skin: Negative.    Allergic/Immunologic: Negative.    Hematological: Negative.    Psychiatric/Behavioral: Negative.        Objective:   /86   Pulse 72   Temp 97.5 °F (36.4 °C)   Ht 5' 7" (1.702 m)   Wt 103.6 kg (228 lb 6.3 oz)   LMP 02/28/2012   SpO2 96%   BMI 35.77 kg/m²     Physical Exam  Vitals reviewed.   Constitutional:       Appearance: Normal appearance. She is obese.   HENT:      Head: Normocephalic and atraumatic.   Eyes:      Extraocular Movements: Extraocular movements intact.      Conjunctiva/sclera: Conjunctivae normal.   Pulmonary:      Effort: No respiratory distress.   Neurological:      General: No focal deficit present.      Mental Status: She is alert and oriented to person, place, and time. Mental status is at baseline.   Psychiatric:         Mood and Affect: Mood normal.         Behavior: Behavior normal.             Lab Results   Component Value Date    WBC 6.58 02/07/2022    HGB 11.3 (L) 02/07/2022    HCT 36.0 (L) 02/07/2022     02/07/2022    CHOL 144 06/03/2021    TRIG 322 (H) 06/03/2021    HDL 26 (L) 06/03/2021    ALT 47 (H) 02/07/2022    AST 33 02/07/2022     02/07/2022    K 4.4 02/07/2022     02/07/2022    CREATININE 1.0 02/07/2022    BUN 17 02/07/2022    CO2 25 02/07/2022    TSH 0.658 11/17/2021    INR 1.1 05/07/2019    HGBA1C 7.7 (H) 11/17/2021    HGBA1C 7.7 (H) 11/17/2021       Medication List with Changes/Refills   Current Medications    FAMOTIDINE (PEPCID) 40 MG TABLET    TAKE ONE TABLET BY MOUTH EVERY EVENING.    FLASH GLUCOSE SENSOR (CeutiCareSTYLE DAKOTAH 14 DAY SENSOR) KIT    Use 1 sensor every 14 days to check blood glucose    INSULIN ASPART U-100 (NOVOLOG) 100 UNIT/ML INJECTION    30 u, 30u and then 35u    INSULIN DEGLUDEC " (TRESIBA FLEXTOUCH U-200) 200 UNIT/ML (3 ML) INSULIN PEN    Inject 80 Units into the skin once daily.    LEVOTHYROXINE (SYNTHROID) 75 MCG TABLET    Take 1 tablet (75 mcg total) by mouth once daily.    LORAZEPAM (ATIVAN) 1 MG TABLET    Take 1 mg by mouth every evening.    LOSARTAN (COZAAR) 50 MG TABLET    Take 1 tablet (50 mg total) by mouth once daily.    MAGNESIUM OXIDE 500 MG TAB    Take 500 mg by mouth once daily.    METFORMIN (GLUCOPHAGE-XR) 500 MG ER 24HR TABLET    TAKE ONE TABLET BY MOUTH TWICE DAILY WITH MEALS.    MULTIVITAMIN (THERAGRAN) TABLET    Take 1 tablet by mouth once daily.    PANTOPRAZOLE (PROTONIX) 40 MG TABLET    TAKE ONE TABLET BY MOUTH TWICE DAILY BEFORE MEALS    ROSUVASTATIN (CRESTOR) 10 MG TABLET    Take 1 tablet (10 mg total) by mouth every evening.    TIZANIDINE (ZANAFLEX) 4 MG TABLET    Take 1 tablet by mouth every evening.    VILAZODONE (VIIBRYD) 40 MG TAB TABLET    Take 1 tablet (40 mg total) by mouth once daily.   Changed and/or Refilled Medications    Modified Medication Previous Medication    TACROLIMUS (PROGRAF) 1 MG CAP tacrolimus (PROGRAF) 1 MG Cap       Take 1 capsule (1 mg total) by mouth every 12 (twelve) hours. Z94.0    Take 1 capsule (1 mg total) by mouth every 12 (twelve) hours. Z94.0           Assessment:   62 y.o. female with multiple co-morbid illnesses here to follow-up with PCP and continue work-up of chronic issues notably HA, dysequilibrium, T2DM,     Plan:     Problem List Items Addressed This Visit        Neuro    New daily persistent headache     Recently had MRI Brain at outside facility - requesting results  - recommend follow up with eye clinic with vision changes.  May be related to her neck/back pain; will f/u after she receives fentanyl injection this Wednesday to see if pain and headaches have subsided.  May also improve upon control of T2DM.              Endocrine    Controlled type 2 diabetes mellitus with complication, with long-term current use of insulin  - Primary     Switch Tresiba to 40 units twice daily for three days to see if glucose levels come down. If still in the 200s, increase to 45 units twice daily.  Continue Novolog 30/35/35.  Will f/u in 3 days to monitor changes.  Referral to endocrinology for potential insulin pump.         Relevant Orders    Ambulatory referral/consult to Endocrinology       Other    Disequilibrium     Unclear etiology   Will f/u results of MRI.  Patient has appt with neurology end of March.                           Health Maintenance       Date Due Completion Date    Shingles Vaccine (1 of 2) Never done ---    Eye Exam 11/18/2021 11/18/2020    Foot Exam 01/08/2022 1/8/2021    Override on 10/2/2019: Done    COVID-19 Vaccine (3 - Booster for Pfizer series) 01/14/2022 7/14/2021    Hemoglobin A1c 02/17/2022 11/17/2021    Lipid Panel 06/03/2022 6/3/2021    Mammogram 08/05/2022 8/5/2021    Diabetes Urine Screening 11/17/2022 11/17/2021    Low Dose Statin 01/31/2023 1/31/2022    Pap Smear with HPV Cotest 09/04/2023 9/4/2018    Pneumococcal Vaccines (Age 0-64) (3 of 4 - PPSV23) 04/30/2024 4/30/2019    Colorectal Cancer Screening 05/09/2024 5/9/2019    TETANUS VACCINE 07/12/2027 7/12/2017              Follow up if symptoms worsen or fail to improve.  30  minutes of total time spent on the encounter, which includes face to face time and non-face to face time preparing to see the patient (eg, review of tests), Obtaining and/or reviewing separately obtained history, Documenting clinical information in the electronic or other health record, Independently interpreting results (not separately reported) and communicating results to the patient/family/caregiver, or Care coordination (not separately reported).      Katy Keys (M4)      Subjective obtained by medical student and discussed with attending in presence of patient during office visit. While medical student participates with physical exam and  A/P , the final exam and a/p as  documented was completed by attending Dr. Gen Blevins MD  Internal Medicine- Geriatrics  [facility] [place]

## 2022-02-15 NOTE — ASSESSMENT & PLAN NOTE
Switch Tresiba to 40 units twice daily for three days to see if glucose levels come down. If still in the 200s, increase to 45 units twice daily.  Continue Novolog 30/35/35.  Will f/u in 3 days to monitor changes.  Referral to endocrinology for potential insulin pump.

## 2022-02-15 NOTE — PROGRESS NOTES
Kidney Post-Transplant Assessment    Referring Physician: Dalton Heaton  Current Nephrologist: Dalton Heaton    ORGAN: LEFT KIDNEY  Donor Type: living   PHS Increased Risk: no  Cold Ischemia: 37 mins  Induction Medications: campath - alemtuzumab (anti-cd52)      Subjective:     CC:  Reassessment of renal allograft function and management of chronic immunosuppression.    Kidney History:    Ms. Newell is a 61 y.o. year old White female who received a living unrelated kidney transplant on 1/14/19 (Compath induction).  She has CKD stage 2 - GFR 60-89 and her baseline creatinine is between 0.8-1.0. She takes tacrolimus for maintenance immunosuppression.     Pertinent History:  -LURD KT (friend) 01/14/2019, Induced with Campath.     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 Rx until 7/14/19  - FUNGAL px: None  - -admitted 05/07/2019 with fever infectious workup negative for bacteria.  Refractory anemia noted, and it was found a parvovirus B19 from 4/15/19 causing aplastic anemia was positive  - recurrent UTI - resolved, saw Uro gynecologist at [facility]    POST TRANSPLANT UPDATE 02/14/2022   Andra presents feeling well overall. She reports sugars have been high,a dn she is working with her provider who recently changed insulin. She also noted 3 episodes each lasting a minute of sharp RLQ pain over the last 3 mos. She cannot identify any associated events, triggers or pattern. She reports no LUTS.  She is working on back pain; prior efforts to get pain pump failed d/t  allergy. She is now being checked for fentanyl pump.  HTN: BPs running 140s/70s, except higher when pain flares like it is right now.    Current Outpatient Medications   Medication Sig    famotidine (PEPCID) 40 MG tablet TAKE ONE TABLET BY MOUTH EVERY EVENING.    flash glucose sensor (FREESTYLE DAKOTAH 14 DAY SENSOR) Kit Use 1 sensor every 14 days to check blood glucose    insulin aspart U-100 (NOVOLOG) 100 unit/mL injection 30 u, 30u and then 35u    insulin degludec (TRESIBA FLEXTOUCH U-200) 200 unit/mL (3 mL) insulin pen Inject 80 Units into the skin once daily.    levothyroxine (SYNTHROID) 75 MCG tablet Take 1 tablet (75 mcg total) by mouth once daily.    LORazepam (ATIVAN) 1 MG tablet Take 1 mg by mouth every evening.    losartan (COZAAR) 50 MG tablet Take 1 tablet (50 mg total) by mouth once daily.    magnesium oxide 500 mg Tab Take 500 mg by mouth once daily.    metFORMIN (GLUCOPHAGE-XR) 500 MG ER 24hr tablet TAKE ONE TABLET BY MOUTH TWICE DAILY WITH MEALS.    multivitamin (THERAGRAN) tablet Take 1 tablet by mouth once daily.    pantoprazole (PROTONIX) 40 MG tablet TAKE ONE TABLET BY MOUTH TWICE DAILY BEFORE MEALS    rosuvastatin (CRESTOR) 10 MG tablet Take 1 tablet (10 mg total) by mouth every evening.    tiZANidine (ZANAFLEX) 4 MG tablet Take 1 tablet by mouth every evening.    vilazodone (VIIBRYD) 40 mg Tab tablet Take 1 tablet (40 mg total) by mouth once daily.    tacrolimus (PROGRAF) 1 MG Cap Take 1 capsule (1 mg total) by mouth every 12 (twelve) hours. Z94.0     No current facility-administered medications for this visit.       Review of Systems   Constitutional: Negative.    Eyes: Negative.    Respiratory: Negative.    Cardiovascular: Negative.    Gastrointestinal: Positive for abdominal pain.   Genitourinary: Negative.    Musculoskeletal: Positive for back pain.   Allergic/Immunologic: Positive for immunocompromised state.     Objective:     Blood pressure (!) 175/79, pulse 67, temperature  "97.3 °F (36.3 °C), temperature source Tympanic, resp. rate 17, height 5' 7" (1.702 m), weight 102.4 kg (225 lb 12 oz), last menstrual period 02/28/2012, SpO2 95 %.  body mass index is 35.36 kg/m².    Physical Exam  Constitutional:       General: She is not in acute distress.  Cardiovascular:      Rate and Rhythm: Normal rate and regular rhythm.   Pulmonary:      Effort: Pulmonary effort is normal. No respiratory distress.      Breath sounds: Normal breath sounds.   Abdominal:      General: Bowel sounds are normal.      Palpations: Abdomen is soft. There is no mass.      Tenderness: There is no abdominal tenderness.   Musculoskeletal:         General: No edema.   Neurological:      Mental Status: She is oriented to person, place, and time.   Psychiatric:         Mood and Affect: Mood and affect normal.     Labs:  Lab Results   Component Value Date    WBC 6.58 02/07/2022    HGB 11.3 (L) 02/07/2022    HCT 36.0 (L) 02/07/2022     02/07/2022    K 4.4 02/07/2022     02/07/2022    CO2 25 02/07/2022    BUN 17 02/07/2022    CREATININE 1.0 02/07/2022    EGFRNONAA >60.0 02/07/2022    CALCIUM 9.5 02/07/2022    PHOS 3.6 02/07/2022    MG 1.6 02/07/2022    ALBUMIN 3.6 02/07/2022    ALBUMIN 3.6 02/07/2022    AST 33 02/07/2022    ALT 47 (H) 02/07/2022    UTPCR 0.14 02/07/2022    .4 (H) 11/17/2021    TACROLIMUS 6.0 02/07/2022       Lab Results   Component Value Date    EXTANC 1,240 04/15/2019    EXTWBC 2.0 (L) 04/15/2019    EXTSEGS 62.0 04/15/2019    EXTPLATELETS 141 04/15/2019    EXTHEMOGLOBI 8.7 (L) 04/15/2019    EXTHEMATOCRI 27.6 (L) 04/15/2019    EXTCREATININ 0.84 04/15/2019    EXTSODIUM 140 04/15/2019    EXTPOTASSIUM 4.3 04/15/2019    EXTBUN 13 04/15/2019    EXTCO2 23.0 04/15/2019    EXTCALCIUM 8.6 04/15/2019    EXTGLUCOSE 128 (H) 04/15/2019    EXTALBUMIN 3.4 04/15/2019    EXTAST 37 04/15/2019    EXTALT 35 (H) 04/15/2019    EXTBILITOTAL 0.8 04/15/2019       No results found for: EXTCYCLOSLVL, EXTSIROLIMUS, " EXTTACROLVL, EXTPROTCRE, EXTPTHINTACT, EXTPROTEINUA, EXTWBCUA, EXTRBCUA    Labs were reviewed with the patient.    Assessment/Plan:     1. Chronic kidney disease (CKD), stage II (mild)    2. Kidney replaced by transplant    3. Immunosuppressive management encounter following kidney transplant    4. Hypertension secondary to other renal disorders    5. Renal hypertension    6. Parvovirus B19 infection        Highlights  -STOP parvovirus PCR [no need to repeat]    Ms. Newell is a 61 y.o. female with:     CKD II-IIIa  - living unrelated donor kidney 1/14/2019 s/p campath induction.   - Excellent allograft function with baseline Cr 0.9-1.2  - proteinuria minimal    Immunosuppression management:   - Goal tacrolimus is around 4-6  - held MMF owing to parvovirus 2019  - will monitor closely for toxicities; none noted    At risk of opportunistic infections:   - last BK PCR 2/7/22 not detectable  Parvovirus B19   -Viremia with out any symptomatic manifestations, essentially resolved  -will stop checking PCRs    HTN:   - BPs labile, which could be pain related in part  - Asked to cont checking at home    Maureen Cabral MD       [facility] Patient Status  Functional Status: 80% - Normal activity with effort: some symptoms of disease  Physical Capacity: Limited Mobility d/t pain

## 2022-02-23 NOTE — TELEPHONE ENCOUNTER
No new care gaps identified.  Powered by [email or web address]. Reference number: [id number].   2/23/2022 8:26:47 AM CST

## 2022-02-24 RX ORDER — INSULIN DEGLUDEC 200 U/ML
80 INJECTION, SOLUTION SUBCUTANEOUS DAILY
Qty: 12 PEN | Refills: 3 | Status: SHIPPED | OUTPATIENT
Start: 2022-02-24 | End: 2022-08-02 | Stop reason: SDUPTHER

## 2022-03-08 ENCOUNTER — TELEPHONE (OUTPATIENT)
Dept: HEPATOLOGY | Facility: CLINIC | Age: 62
End: 2022-03-08
Payer: MEDICARE

## 2022-03-09 ENCOUNTER — OFFICE VISIT (OUTPATIENT)
Dept: HEPATOLOGY | Facility: CLINIC | Age: 62
End: 2022-03-09
Payer: MEDICARE

## 2022-03-09 ENCOUNTER — PROCEDURE VISIT (OUTPATIENT)
Dept: HEPATOLOGY | Facility: CLINIC | Age: 62
End: 2022-03-09
Payer: MEDICARE

## 2022-03-09 ENCOUNTER — LAB VISIT (OUTPATIENT)
Dept: LAB | Facility: HOSPITAL | Age: 62
End: 2022-03-09
Payer: MEDICARE

## 2022-03-09 ENCOUNTER — TELEPHONE (OUTPATIENT)
Dept: PRIMARY CARE CLINIC | Facility: CLINIC | Age: 62
End: 2022-03-09
Payer: MEDICARE

## 2022-03-09 VITALS
HEIGHT: 67 IN | OXYGEN SATURATION: 95 % | TEMPERATURE: 98 F | WEIGHT: 223.13 LBS | BODY MASS INDEX: 35.02 KG/M2 | DIASTOLIC BLOOD PRESSURE: 70 MMHG | SYSTOLIC BLOOD PRESSURE: 140 MMHG | HEART RATE: 97 BPM | RESPIRATION RATE: 18 BRPM

## 2022-03-09 DIAGNOSIS — K76.0 NAFLD (NONALCOHOLIC FATTY LIVER DISEASE): ICD-10-CM

## 2022-03-09 DIAGNOSIS — Z94.0 KIDNEY REPLACED BY TRANSPLANT: ICD-10-CM

## 2022-03-09 DIAGNOSIS — K74.00 HEPATIC FIBROSIS, UNSPECIFIED: ICD-10-CM

## 2022-03-09 DIAGNOSIS — Z94.0 STATUS POST LIVING-DONOR KIDNEY TRANSPLANTATION: ICD-10-CM

## 2022-03-09 DIAGNOSIS — K76.0 NAFLD (NONALCOHOLIC FATTY LIVER DISEASE): Primary | ICD-10-CM

## 2022-03-09 DIAGNOSIS — Z79.4 CONTROLLED TYPE 2 DIABETES MELLITUS WITH COMPLICATION, WITH LONG-TERM CURRENT USE OF INSULIN: ICD-10-CM

## 2022-03-09 DIAGNOSIS — R74.01 TRANSAMINITIS: ICD-10-CM

## 2022-03-09 DIAGNOSIS — E11.8 CONTROLLED TYPE 2 DIABETES MELLITUS WITH COMPLICATION, WITH LONG-TERM CURRENT USE OF INSULIN: ICD-10-CM

## 2022-03-09 LAB
ALBUMIN SERPL BCP-MCNC: 3.9 G/DL (ref 3.5–5.2)
ALP SERPL-CCNC: 153 U/L (ref 55–135)
ALT SERPL W/O P-5'-P-CCNC: 47 U/L (ref 10–44)
ANION GAP SERPL CALC-SCNC: 13 MMOL/L (ref 8–16)
AST SERPL-CCNC: 37 U/L (ref 10–40)
BASOPHILS # BLD AUTO: 0.06 K/UL (ref 0–0.2)
BASOPHILS NFR BLD: 0.5 % (ref 0–1.9)
BILIRUB SERPL-MCNC: 0.3 MG/DL (ref 0.1–1)
BUN SERPL-MCNC: 21 MG/DL (ref 8–23)
CALCIUM SERPL-MCNC: 9.7 MG/DL (ref 8.7–10.5)
CHLORIDE SERPL-SCNC: 102 MMOL/L (ref 95–110)
CO2 SERPL-SCNC: 25 MMOL/L (ref 23–29)
CREAT SERPL-MCNC: 1 MG/DL (ref 0.5–1.4)
DIFFERENTIAL METHOD: ABNORMAL
EOSINOPHIL # BLD AUTO: 0.7 K/UL (ref 0–0.5)
EOSINOPHIL NFR BLD: 6.2 % (ref 0–8)
ERYTHROCYTE [DISTWIDTH] IN BLOOD BY AUTOMATED COUNT: 14.3 % (ref 11.5–14.5)
EST. GFR  (AFRICAN AMERICAN): >60 ML/MIN/1.73 M^2
EST. GFR  (NON AFRICAN AMERICAN): 59.7 ML/MIN/1.73 M^2
FERRITIN SERPL-MCNC: 90 NG/ML (ref 20–300)
GLUCOSE SERPL-MCNC: 188 MG/DL (ref 70–110)
HCT VFR BLD AUTO: 38.8 % (ref 37–48.5)
HGB BLD-MCNC: 12.3 G/DL (ref 12–16)
IMM GRANULOCYTES # BLD AUTO: 0.05 K/UL (ref 0–0.04)
IMM GRANULOCYTES NFR BLD AUTO: 0.5 % (ref 0–0.5)
LYMPHOCYTES # BLD AUTO: 1.6 K/UL (ref 1–4.8)
LYMPHOCYTES NFR BLD: 14.3 % (ref 18–48)
MCH RBC QN AUTO: 27.9 PG (ref 27–31)
MCHC RBC AUTO-ENTMCNC: 31.7 G/DL (ref 32–36)
MCV RBC AUTO: 88 FL (ref 82–98)
MONOCYTES # BLD AUTO: 0.6 K/UL (ref 0.3–1)
MONOCYTES NFR BLD: 5.6 % (ref 4–15)
NEUTROPHILS # BLD AUTO: 8.1 K/UL (ref 1.8–7.7)
NEUTROPHILS NFR BLD: 72.9 % (ref 38–73)
NRBC BLD-RTO: 0 /100 WBC
PLATELET # BLD AUTO: 283 K/UL (ref 150–450)
PMV BLD AUTO: 9.7 FL (ref 9.2–12.9)
POTASSIUM SERPL-SCNC: 4.3 MMOL/L (ref 3.5–5.1)
PROT SERPL-MCNC: 8.2 G/DL (ref 6–8.4)
RBC # BLD AUTO: 4.41 M/UL (ref 4–5.4)
SODIUM SERPL-SCNC: 140 MMOL/L (ref 136–145)
WBC # BLD AUTO: 11.04 K/UL (ref 3.9–12.7)

## 2022-03-09 PROCEDURE — 82728 ASSAY OF FERRITIN: CPT | Performed by: INTERNAL MEDICINE

## 2022-03-09 PROCEDURE — 80053 COMPREHEN METABOLIC PANEL: CPT | Performed by: INTERNAL MEDICINE

## 2022-03-09 PROCEDURE — 3008F BODY MASS INDEX DOCD: CPT | Mod: CPTII,S$GLB,, | Performed by: INTERNAL MEDICINE

## 2022-03-09 PROCEDURE — 3066F NEPHROPATHY DOC TX: CPT | Mod: CPTII,S$GLB,, | Performed by: INTERNAL MEDICINE

## 2022-03-09 PROCEDURE — 3078F DIAST BP <80 MM HG: CPT | Mod: CPTII,S$GLB,, | Performed by: INTERNAL MEDICINE

## 2022-03-09 PROCEDURE — 91200 LIVER ELASTOGRAPHY: CPT | Mod: S$GLB,,, | Performed by: INTERNAL MEDICINE

## 2022-03-09 PROCEDURE — 99499 UNLISTED E&M SERVICE: CPT | Mod: S$GLB,,, | Performed by: INTERNAL MEDICINE

## 2022-03-09 PROCEDURE — 86235 NUCLEAR ANTIGEN ANTIBODY: CPT | Performed by: INTERNAL MEDICINE

## 2022-03-09 PROCEDURE — 82542 COL CHROMOTOGRAPHY QUAL/QUAN: CPT | Performed by: INTERNAL MEDICINE

## 2022-03-09 PROCEDURE — 3051F PR MOST RECENT HEMOGLOBIN A1C LEVEL 7.0 - < 8.0%: ICD-10-PCS | Mod: CPTII,S$GLB,, | Performed by: INTERNAL MEDICINE

## 2022-03-09 PROCEDURE — 3077F SYST BP >= 140 MM HG: CPT | Mod: CPTII,S$GLB,, | Performed by: INTERNAL MEDICINE

## 2022-03-09 PROCEDURE — 3066F PR DOCUMENTATION OF TREATMENT FOR NEPHROPATHY: ICD-10-PCS | Mod: CPTII,S$GLB,, | Performed by: INTERNAL MEDICINE

## 2022-03-09 PROCEDURE — 99205 PR OFFICE/OUTPT VISIT, NEW, LEVL V, 60-74 MIN: ICD-10-PCS | Mod: S$GLB,,, | Performed by: INTERNAL MEDICINE

## 2022-03-09 PROCEDURE — 99999 PR PBB SHADOW E&M-EST. PATIENT-LVL IV: CPT | Mod: PBBFAC,,, | Performed by: INTERNAL MEDICINE

## 2022-03-09 PROCEDURE — 99205 OFFICE O/P NEW HI 60 MIN: CPT | Mod: S$GLB,,, | Performed by: INTERNAL MEDICINE

## 2022-03-09 PROCEDURE — 1159F MED LIST DOCD IN RCRD: CPT | Mod: CPTII,S$GLB,, | Performed by: INTERNAL MEDICINE

## 2022-03-09 PROCEDURE — 4010F PR ACE/ARB THEARPY RXD/TAKEN: ICD-10-PCS | Mod: CPTII,S$GLB,, | Performed by: INTERNAL MEDICINE

## 2022-03-09 PROCEDURE — 87340 HEPATITIS B SURFACE AG IA: CPT | Performed by: INTERNAL MEDICINE

## 2022-03-09 PROCEDURE — 99999 PR PBB SHADOW E&M-EST. PATIENT-LVL IV: ICD-10-PCS | Mod: PBBFAC,,, | Performed by: INTERNAL MEDICINE

## 2022-03-09 PROCEDURE — 3077F PR MOST RECENT SYSTOLIC BLOOD PRESSURE >= 140 MM HG: ICD-10-PCS | Mod: CPTII,S$GLB,, | Performed by: INTERNAL MEDICINE

## 2022-03-09 PROCEDURE — 99499 RISK ADDL DX/OHS AUDIT: ICD-10-PCS | Mod: S$GLB,,, | Performed by: INTERNAL MEDICINE

## 2022-03-09 PROCEDURE — 1160F RVW MEDS BY RX/DR IN RCRD: CPT | Mod: CPTII,S$GLB,, | Performed by: INTERNAL MEDICINE

## 2022-03-09 PROCEDURE — 4010F ACE/ARB THERAPY RXD/TAKEN: CPT | Mod: CPTII,S$GLB,, | Performed by: INTERNAL MEDICINE

## 2022-03-09 PROCEDURE — 86803 HEPATITIS C AB TEST: CPT | Performed by: INTERNAL MEDICINE

## 2022-03-09 PROCEDURE — 1160F PR REVIEW ALL MEDS BY PRESCRIBER/CLIN PHARMACIST DOCUMENTED: ICD-10-PCS | Mod: CPTII,S$GLB,, | Performed by: INTERNAL MEDICINE

## 2022-03-09 PROCEDURE — 3078F PR MOST RECENT DIASTOLIC BLOOD PRESSURE < 80 MM HG: ICD-10-PCS | Mod: CPTII,S$GLB,, | Performed by: INTERNAL MEDICINE

## 2022-03-09 PROCEDURE — 1159F PR MEDICATION LIST DOCUMENTED IN MEDICAL RECORD: ICD-10-PCS | Mod: CPTII,S$GLB,, | Performed by: INTERNAL MEDICINE

## 2022-03-09 PROCEDURE — 86038 ANTINUCLEAR ANTIBODIES: CPT | Performed by: INTERNAL MEDICINE

## 2022-03-09 PROCEDURE — 91200 FIBROSCAN (VIBRATION CONTROLLED TRANSIENT ELASTOGRAPHY): ICD-10-PCS | Mod: S$GLB,,, | Performed by: INTERNAL MEDICINE

## 2022-03-09 PROCEDURE — 85025 COMPLETE CBC W/AUTO DIFF WBC: CPT | Performed by: INTERNAL MEDICINE

## 2022-03-09 PROCEDURE — 3008F PR BODY MASS INDEX (BMI) DOCUMENTED: ICD-10-PCS | Mod: CPTII,S$GLB,, | Performed by: INTERNAL MEDICINE

## 2022-03-09 PROCEDURE — 36415 COLL VENOUS BLD VENIPUNCTURE: CPT | Performed by: INTERNAL MEDICINE

## 2022-03-09 PROCEDURE — 3051F HG A1C>EQUAL 7.0%<8.0%: CPT | Mod: CPTII,S$GLB,, | Performed by: INTERNAL MEDICINE

## 2022-03-09 PROCEDURE — 86256 FLUORESCENT ANTIBODY TITER: CPT | Mod: 91 | Performed by: INTERNAL MEDICINE

## 2022-03-09 RX ORDER — HYDROCODONE BITARTRATE AND ACETAMINOPHEN 10; 325 MG/1; MG/1
1 TABLET ORAL 3 TIMES DAILY PRN
COMMUNITY
Start: 2022-01-27 | End: 2022-08-30 | Stop reason: CLARIF

## 2022-03-09 NOTE — PROGRESS NOTES
Subjective:       Patient ID: Andra Newell is a 62 y.o. female.    Chief Complaint: No chief complaint on file.    HPI  I saw this 62 y.o. lady who came to the liver clinic for evaluation.  She has mildly abnormal LFTs and risk factors for NAFLD.    Body mass index is 34.94 kg/m².  She feels well and has no liver-related symptoms.    FIb 4= 1.35- little chance of advanced fibrosis  However, a fibroscan today suggests stage 4 fibrosis    PMH:  Post kidney Transplant- Jan 2019  DM  Obesity  Hyperlipidemia      Review of Systems   Constitutional: Negative for activity change, appetite change, chills, fatigue, fever and unexpected weight change.   HENT: Negative for ear pain, hearing loss, nosebleeds, sore throat and trouble swallowing.    Eyes: Negative for redness and visual disturbance.   Respiratory: Negative for cough, chest tightness, shortness of breath and wheezing.    Cardiovascular: Negative for chest pain and palpitations.   Gastrointestinal: Negative for abdominal distention, abdominal pain, blood in stool, constipation, diarrhea, nausea and vomiting.   Genitourinary: Negative for difficulty urinating, dysuria, frequency, hematuria and urgency.   Musculoskeletal: Negative for arthralgias, back pain, gait problem, joint swelling and myalgias.   Skin: Negative for rash.   Neurological: Negative for tremors, seizures, speech difficulty, weakness and headaches.   Hematological: Negative for adenopathy.   Psychiatric/Behavioral: Negative for confusion, decreased concentration and sleep disturbance. The patient is not nervous/anxious.          Lab Results   Component Value Date    ALT 47 (H) 03/09/2022    AST 37 03/09/2022    ALKPHOS 153 (H) 03/09/2022    BILITOT 0.3 03/09/2022     Past Medical History:   Diagnosis Date    Anemia     Anemia in chronic kidney disease, on chronic dialysis 7/12/2017    Anxiety and depression     Aplastic anemia with parvovirus B19 infection 5/27/2019    Arthritis      "Asthma     allergic airway    CKD stage III (moderate) 2/18/2019    Colon polyp     Depression     Diabetes mellitus     Diverticulosis     Encounter for blood transfusion     Eosinophilia     ESRD (end stage renal disease)     stage V, due for living donor 9/2018    ESRD on dialysis     GERD (gastroesophageal reflux disease)     Gout     Gout     Hyperlipidemia     Hypertension     Hypertriglyceridemia without hypercholesterolemia 6/9/2019    Low back pain     Low HDL (under 40) 6/9/2019    MRSA carrier     Neutropenia, unspecified 5/8/2019    Obesity     Renal disorder     Rotator cuff syndrome--left     Thyroid disease     Ulnar neuropathy of right upper extremity     Uncontrolled type 2 diabetes mellitus with hyperglycemia      Past Surgical History:   Procedure Laterality Date    ACHILLES TENDON SURGERY Left May 2015    BACK SURGERY      CERVICAL SPINE SURGERY  2021    cervical gate placed by Dr. Vera- screws,plate, and "gate"    CHOLECYSTECTOMY      COLONOSCOPY N/A 9/6/2017    Procedure: COLONOSCOPY;  Surgeon: Marisol Bryson MD;  Location: [facility];  Service: Endoscopy;  Laterality: N/A; REPEAT IN 3 YEARS FOR SURVEILLANCE    COLONOSCOPY N/A 5/9/2019    Procedure: COLONOSCOPY;  Surgeon: Fady Ewing MD;  Location: [facility] ENDO (2ND FLR);  Service: Endoscopy;  Laterality: N/A;    DIALYSIS FISTULA CREATION  12/2017    ESOPHAGOGASTRODUODENOSCOPY N/A 5/9/2019    Procedure: EGD (ESOPHAGOGASTRODUODENOSCOPY);  Surgeon: Fady Ewing MD;  Location: [facility] ENDO (2ND FLR);  Service: Endoscopy;  Laterality: N/A;    ESOPHAGOGASTRODUODENOSCOPY N/A 6/10/2021    Procedure: EGD (ESOPHAGOGASTRODUODENOSCOPY);  Surgeon: Marisol Bryson MD;  Location: [facility];  Service: Endoscopy;  Laterality: N/A;    HEMORRHOID SURGERY      INJECTION OF ANESTHETIC AGENT AROUND MEDIAL BRANCH NERVES INNERVATING LUMBAR FACET JOINT Right 9/25/2019    Procedure: Block-nerve-medial branch-lumbar;  Surgeon: " Yonny Ferrer MD;  Location: [facility] OR;  Service: Pain Management;  Laterality: Right;  L2, 3, 4,5     INJECTION OF ANESTHETIC AGENT AROUND MEDIAL BRANCH NERVES INNERVATING LUMBAR FACET JOINT Right 10/16/2019    Procedure: Block-nerve-medial branch-lumbar;  Surgeon: Yonny Ferrer MD;  Location: [facility] OR;  Service: Pain Management;  Laterality: Right;  L2, 3, 4,5     JOINT REPLACEMENT      left    KIDNEY TRANSPLANT N/A 1/14/2019    Procedure: TRANSPLANT, KIDNEY;  Surgeon: Roladn Salazar MD;  [facility];  Service: Transplant;  Laterality: N/A;    KNEE SURGERY      RADIOFREQUENCY ABLATION OF LUMBAR MEDIAL BRANCH NERVE AT SINGLE LEVEL Right 10/29/2019    Procedure: Radiofrequency Ablation, Nerve, Spinal, Lumbar, Medial Branch, 1 Level;  Surgeon: Yonny Ferrer MD;  Location: [facility] OR;  Service: Pain Management;  Laterality: Right;  L2, 3, 4, 5  burned at 80 degrees C X 60 seconds each site X 2    SHOULDER ARTHROSCOPY      SHOULDER SURGERY      UPPER GASTROINTESTINAL ENDOSCOPY  ~2013    Dr. Moralez     Current Outpatient Medications   Medication Sig    famotidine (PEPCID) 40 MG tablet TAKE ONE TABLET BY MOUTH EVERY EVENING.    flash glucose sensor (FREESTYLE DAKOTAH 14 DAY SENSOR) Kit Use 1 sensor every 14 days to check blood glucose    insulin aspart U-100 (NOVOLOG) 100 unit/mL injection 30 u, 30u and then 35u    insulin degludec (TRESIBA FLEXTOUCH U-200) 200 unit/mL (3 mL) insulin pen Inject 80 Units into the skin once daily.    levothyroxine (SYNTHROID) 75 MCG tablet Take 1 tablet (75 mcg total) by mouth once daily.    LORazepam (ATIVAN) 1 MG tablet Take 1 mg by mouth every evening.    losartan (COZAAR) 50 MG tablet Take 1 tablet (50 mg total) by mouth once daily.    magnesium oxide 500 mg Tab Take 500 mg by mouth once daily.    metFORMIN (GLUCOPHAGE-XR) 500 MG ER 24hr tablet TAKE ONE TABLET BY MOUTH TWICE DAILY WITH MEALS.    multivitamin (THERAGRAN) tablet Take 1 tablet by mouth once daily.     pantoprazole (PROTONIX) 40 MG tablet TAKE ONE TABLET BY MOUTH TWICE DAILY BEFORE MEALS    rosuvastatin (CRESTOR) 10 MG tablet Take 1 tablet (10 mg total) by mouth every evening.    tacrolimus (PROGRAF) 1 MG Cap Take 1 capsule (1 mg total) by mouth every 12 (twelve) hours. Z94.0    tiZANidine (ZANAFLEX) 4 MG tablet Take 1 tablet by mouth every evening.    vilazodone (VIIBRYD) 40 mg Tab tablet Take 1 tablet (40 mg total) by mouth once daily.    HYDROcodone-acetaminophen (NORCO)  mg per tablet Take 1 tablet by mouth 3 (three) times daily as needed.     No current facility-administered medications for this visit.       Objective:      Physical Exam  Constitutional:       General: She is not in acute distress.  HENT:      Head: Normocephalic.   Eyes:      Pupils: Pupils are equal, round, and reactive to light.   Neck:      Thyroid: No thyromegaly.      Vascular: No JVD.      Trachea: No tracheal deviation.   Cardiovascular:      Rate and Rhythm: Normal rate and regular rhythm.      Heart sounds: Normal heart sounds. No murmur heard.  Pulmonary:      Effort: Pulmonary effort is normal.      Breath sounds: Normal breath sounds. No stridor.   Abdominal:      Palpations: Abdomen is soft.   Lymphadenopathy:      Head:      Right side of head: No submental, submandibular, tonsillar, preauricular, posterior auricular or occipital adenopathy.      Left side of head: No submental, submandibular, tonsillar, preauricular, posterior auricular or occipital adenopathy.      Cervical: No cervical adenopathy.   Neurological:      Mental Status: She is alert. She is not disoriented.      Cranial Nerves: No cranial nerve deficit.      Sensory: No sensory deficit.           Assessment:       1. NAFLD (nonalcoholic fatty liver disease)    2. Hepatic fibrosis, unspecified     3. Living-donor kidney transplant 1/14/2019    4. Controlled type 2 diabetes mellitus with complication, with long-term current use of insulin        Plan:    She certainly appears to have NAFLD with significant fibrosis according to the fibroscan.    - labs today to check for other causes of liver disease  - liver US  - suggest weight loss and better DM control  - clinic in 3 months with repeat fibroscan

## 2022-03-09 NOTE — TELEPHONE ENCOUNTER
Spoke to patient and states she was referred by Dr. Blevins in [place] to establish with Dr. Vaughn. Explained to patient that once we move to our new location we may have to switch her to another provider, but for now we can get her set up with Dr. Vaughn. Verbalized understanding.

## 2022-03-09 NOTE — TELEPHONE ENCOUNTER
----- Message from Sunita Chu sent at 3/7/2022  1:33 PM CST -----  Regarding: advice  Contact: Patient/[phone] (home)  Type: Needs Medical Advice  Who Called:  Patient/[phone] (home)       Additional Information: Patient was a patient of Dr. Blevins in [place]. She moved to [place] and was told to contact your office about establishing care. Please call to advise.

## 2022-03-09 NOTE — TELEPHONE ENCOUNTER
Okay to discuss our transition to [facility]  We wiill have new providers starting soon but we do not have an exact date.  I would plan to have her establish a soon is they are available.  If something is needed immediately then I would be happy to see her than help her transition.

## 2022-03-09 NOTE — TELEPHONE ENCOUNTER
----- Message from Graciela Wolff MA sent at 3/9/2022 11:54 AM CST -----  Type:  Patient Returning Call    Who Called:  pt  Who Left Message for Patient:  Komal  Does the patient know what this is regarding?:  yes  Best Call Back Number:  [phone] (home)     Additional Information:  please advise--thank you

## 2022-03-09 NOTE — TELEPHONE ENCOUNTER
----- Message from Tiburcio VIERA Yinkaedison sent at 3/9/2022 12:03 PM CST -----  Contact: patient  Type: Needs Medical Advice  Who Called:  patient  Symptoms (please be specific):  n/a  How long has patient had these symptoms:  n/a  Pharmacy name and phone #:  n/a  Best Call Back Number: [phone]  Additional Information: Unsuccessful IM sent to office.  Patient called in and stated she was returning call to office about scheduling an appointment.

## 2022-03-10 ENCOUNTER — PATIENT MESSAGE (OUTPATIENT)
Dept: TRANSPLANT | Facility: CLINIC | Age: 62
End: 2022-03-10
Payer: MEDICARE

## 2022-03-10 LAB — ANA SER QL IF: NORMAL

## 2022-03-11 LAB
HBV SURFACE AG SERPL QL IA: NEGATIVE
HCV AB SERPL QL IA: NEGATIVE
MITOCHONDRIA AB TITR SER IF: NORMAL {TITER}
SMOOTH MUSCLE AB TITR SER IF: NORMAL {TITER}

## 2022-03-11 NOTE — PATIENT INSTRUCTIONS
Mssg sent to patient: CBC and chemistries look fine. The other studies, ordered by hepatology, will be reviewed by them when resulted.

## 2022-03-14 PROBLEM — K76.0 NAFLD (NONALCOHOLIC FATTY LIVER DISEASE): Status: ACTIVE | Noted: 2022-03-14

## 2022-03-15 ENCOUNTER — TELEPHONE (OUTPATIENT)
Dept: HEPATOLOGY | Facility: CLINIC | Age: 62
End: 2022-03-15
Payer: MEDICARE

## 2022-03-15 LAB
A1AT PROTEOTYPE S/Z, LC-MS/MS: NORMAL
A1AT SERPL NEPH-MCNC: 162 MG/DL (ref 100–190)

## 2022-03-15 NOTE — TELEPHONE ENCOUNTER
----- Message from Mukesh Christiansen MD sent at 3/14/2022  4:25 PM CDT -----  Can you bring her back in 3 months instead of 6 months?

## 2022-03-21 ENCOUNTER — PATIENT OUTREACH (OUTPATIENT)
Dept: ADMINISTRATIVE | Facility: OTHER | Age: 62
End: 2022-03-21
Payer: MEDICARE

## 2022-03-21 NOTE — PROGRESS NOTES
Health Maintenance Due   Topic Date Due    Shingles Vaccine (1 of 2) Never done    Eye Exam  11/18/2021    COVID-19 Vaccine (3 - Booster for Pfizer series) 12/14/2021    Foot Exam  01/08/2022     Updates were requested from care everywhere.  Chart was reviewed for overdue Proactive [facility] Encounters ([facility]) topics (CRS, Breast Cancer Screening, Eye exam)  Health Maintenance has been updated.  LINKS immunization registry triggered.  Immunizations were reconciled.

## 2022-03-31 ENCOUNTER — OFFICE VISIT (OUTPATIENT)
Dept: PRIMARY CARE CLINIC | Facility: CLINIC | Age: 62
End: 2022-03-31
Payer: MEDICARE

## 2022-03-31 VITALS
BODY MASS INDEX: 34.67 KG/M2 | HEIGHT: 67 IN | OXYGEN SATURATION: 98 % | HEART RATE: 82 BPM | RESPIRATION RATE: 18 BRPM | DIASTOLIC BLOOD PRESSURE: 80 MMHG | SYSTOLIC BLOOD PRESSURE: 138 MMHG | WEIGHT: 220.88 LBS

## 2022-03-31 DIAGNOSIS — Z94.0 STATUS POST LIVING-DONOR KIDNEY TRANSPLANTATION: Chronic | ICD-10-CM

## 2022-03-31 DIAGNOSIS — E11.8 CONTROLLED TYPE 2 DIABETES MELLITUS WITH COMPLICATION, WITH LONG-TERM CURRENT USE OF INSULIN: Chronic | ICD-10-CM

## 2022-03-31 DIAGNOSIS — Z79.4 CONTROLLED TYPE 2 DIABETES MELLITUS WITH COMPLICATION, WITH LONG-TERM CURRENT USE OF INSULIN: Chronic | ICD-10-CM

## 2022-03-31 DIAGNOSIS — G89.29 CHRONIC BILATERAL LOW BACK PAIN WITHOUT SCIATICA: Chronic | ICD-10-CM

## 2022-03-31 DIAGNOSIS — J45.20 MILD INTERMITTENT ASTHMA WITHOUT COMPLICATION: Chronic | ICD-10-CM

## 2022-03-31 DIAGNOSIS — N25.81 SECONDARY HYPERPARATHYROIDISM OF RENAL ORIGIN: ICD-10-CM

## 2022-03-31 DIAGNOSIS — D61.9 APLASTIC ANEMIA: Chronic | ICD-10-CM

## 2022-03-31 DIAGNOSIS — E83.42 HYPOMAGNESEMIA: Primary | ICD-10-CM

## 2022-03-31 DIAGNOSIS — F33.0 MDD (MAJOR DEPRESSIVE DISORDER), RECURRENT EPISODE, MILD: ICD-10-CM

## 2022-03-31 DIAGNOSIS — E11.22 HYPERTENSION ASSOCIATED WITH STAGE 3 CHRONIC KIDNEY DISEASE DUE TO TYPE 2 DIABETES MELLITUS: ICD-10-CM

## 2022-03-31 DIAGNOSIS — M54.50 CHRONIC BILATERAL LOW BACK PAIN WITHOUT SCIATICA: Chronic | ICD-10-CM

## 2022-03-31 DIAGNOSIS — Z79.60 LONG-TERM USE OF IMMUNOSUPPRESSANT MEDICATION: Chronic | ICD-10-CM

## 2022-03-31 DIAGNOSIS — E03.9 ACQUIRED HYPOTHYROIDISM: Chronic | ICD-10-CM

## 2022-03-31 DIAGNOSIS — I12.9 HYPERTENSION ASSOCIATED WITH STAGE 3 CHRONIC KIDNEY DISEASE DUE TO TYPE 2 DIABETES MELLITUS: ICD-10-CM

## 2022-03-31 DIAGNOSIS — N18.30 HYPERTENSION ASSOCIATED WITH STAGE 3 CHRONIC KIDNEY DISEASE DUE TO TYPE 2 DIABETES MELLITUS: ICD-10-CM

## 2022-03-31 DIAGNOSIS — E78.2 MIXED DIABETIC HYPERLIPIDEMIA ASSOCIATED WITH TYPE 2 DIABETES MELLITUS: Chronic | ICD-10-CM

## 2022-03-31 DIAGNOSIS — D63.8 ANEMIA OF CHRONIC DISEASE: Chronic | ICD-10-CM

## 2022-03-31 DIAGNOSIS — E11.69 MIXED DIABETIC HYPERLIPIDEMIA ASSOCIATED WITH TYPE 2 DIABETES MELLITUS: Chronic | ICD-10-CM

## 2022-03-31 PROBLEM — Z91.89 AT RISK FOR OPPORTUNISTIC INFECTIONS: Chronic | Status: ACTIVE | Noted: 2019-01-15

## 2022-03-31 PROBLEM — D84.9 IMMUNOSUPPRESSION: Chronic | Status: ACTIVE | Noted: 2019-12-10

## 2022-03-31 PROBLEM — M48.062 LUMBAR STENOSIS WITH NEUROGENIC CLAUDICATION: Chronic | Status: ACTIVE | Noted: 2019-04-05

## 2022-03-31 PROBLEM — K76.0 NAFLD (NONALCOHOLIC FATTY LIVER DISEASE): Chronic | Status: ACTIVE | Noted: 2022-03-14

## 2022-03-31 PROBLEM — D64.9 CHRONIC ANEMIA: Chronic | Status: ACTIVE | Noted: 2019-02-11

## 2022-03-31 PROBLEM — M47.896 OTHER SPONDYLOSIS, LUMBAR REGION: Chronic | Status: ACTIVE | Noted: 2019-09-25

## 2022-03-31 PROBLEM — F13.20 BENZODIAZEPINE DEPENDENCE, CONTINUOUS: Chronic | Status: ACTIVE | Noted: 2019-12-10

## 2022-03-31 PROBLEM — E66.9 OBESITY (BMI 30-39.9): Chronic | Status: ACTIVE | Noted: 2019-06-28

## 2022-03-31 PROCEDURE — 99999 PR PBB SHADOW E&M-EST. PATIENT-LVL V: CPT | Mod: PBBFAC,,, | Performed by: FAMILY MEDICINE

## 2022-03-31 PROCEDURE — 3051F HG A1C>EQUAL 7.0%<8.0%: CPT | Mod: CPTII,S$GLB,, | Performed by: FAMILY MEDICINE

## 2022-03-31 PROCEDURE — 3066F PR DOCUMENTATION OF TREATMENT FOR NEPHROPATHY: ICD-10-PCS | Mod: CPTII,S$GLB,, | Performed by: FAMILY MEDICINE

## 2022-03-31 PROCEDURE — 1159F MED LIST DOCD IN RCRD: CPT | Mod: CPTII,S$GLB,, | Performed by: FAMILY MEDICINE

## 2022-03-31 PROCEDURE — 1160F RVW MEDS BY RX/DR IN RCRD: CPT | Mod: CPTII,S$GLB,, | Performed by: FAMILY MEDICINE

## 2022-03-31 PROCEDURE — 4010F PR ACE/ARB THEARPY RXD/TAKEN: ICD-10-PCS | Mod: CPTII,S$GLB,, | Performed by: FAMILY MEDICINE

## 2022-03-31 PROCEDURE — 99499 RISK ADDL DX/OHS AUDIT: ICD-10-PCS | Mod: S$GLB,,, | Performed by: FAMILY MEDICINE

## 2022-03-31 PROCEDURE — 99499 UNLISTED E&M SERVICE: CPT | Mod: S$GLB,,, | Performed by: FAMILY MEDICINE

## 2022-03-31 PROCEDURE — 3075F PR MOST RECENT SYSTOLIC BLOOD PRESS GE 130-139MM HG: ICD-10-PCS | Mod: CPTII,S$GLB,, | Performed by: FAMILY MEDICINE

## 2022-03-31 PROCEDURE — 99215 PR OFFICE/OUTPT VISIT, EST, LEVL V, 40-54 MIN: ICD-10-PCS | Mod: S$GLB,,, | Performed by: FAMILY MEDICINE

## 2022-03-31 PROCEDURE — 1160F PR REVIEW ALL MEDS BY PRESCRIBER/CLIN PHARMACIST DOCUMENTED: ICD-10-PCS | Mod: CPTII,S$GLB,, | Performed by: FAMILY MEDICINE

## 2022-03-31 PROCEDURE — 3051F PR MOST RECENT HEMOGLOBIN A1C LEVEL 7.0 - < 8.0%: ICD-10-PCS | Mod: CPTII,S$GLB,, | Performed by: FAMILY MEDICINE

## 2022-03-31 PROCEDURE — 3079F PR MOST RECENT DIASTOLIC BLOOD PRESSURE 80-89 MM HG: ICD-10-PCS | Mod: CPTII,S$GLB,, | Performed by: FAMILY MEDICINE

## 2022-03-31 PROCEDURE — 3075F SYST BP GE 130 - 139MM HG: CPT | Mod: CPTII,S$GLB,, | Performed by: FAMILY MEDICINE

## 2022-03-31 PROCEDURE — 1159F PR MEDICATION LIST DOCUMENTED IN MEDICAL RECORD: ICD-10-PCS | Mod: CPTII,S$GLB,, | Performed by: FAMILY MEDICINE

## 2022-03-31 PROCEDURE — 4010F ACE/ARB THERAPY RXD/TAKEN: CPT | Mod: CPTII,S$GLB,, | Performed by: FAMILY MEDICINE

## 2022-03-31 PROCEDURE — 99999 PR PBB SHADOW E&M-EST. PATIENT-LVL V: ICD-10-PCS | Mod: PBBFAC,,, | Performed by: FAMILY MEDICINE

## 2022-03-31 PROCEDURE — 99215 OFFICE O/P EST HI 40 MIN: CPT | Mod: S$GLB,,, | Performed by: FAMILY MEDICINE

## 2022-03-31 PROCEDURE — 3008F PR BODY MASS INDEX (BMI) DOCUMENTED: ICD-10-PCS | Mod: CPTII,S$GLB,, | Performed by: FAMILY MEDICINE

## 2022-03-31 PROCEDURE — 3079F DIAST BP 80-89 MM HG: CPT | Mod: CPTII,S$GLB,, | Performed by: FAMILY MEDICINE

## 2022-03-31 PROCEDURE — 3008F BODY MASS INDEX DOCD: CPT | Mod: CPTII,S$GLB,, | Performed by: FAMILY MEDICINE

## 2022-03-31 PROCEDURE — 3066F NEPHROPATHY DOC TX: CPT | Mod: CPTII,S$GLB,, | Performed by: FAMILY MEDICINE

## 2022-03-31 NOTE — ASSESSMENT & PLAN NOTE
Nephrologist Dalton Weiss,   She follows with the transplant clinic at [facility] the [address].  She remains on immunosuppression managed by the transplant team.  Overall she appears to be doing very well in this regard

## 2022-03-31 NOTE — PROGRESS NOTES
THIS DOCUMENT WAS MADE IN PART WITH VOICE RECOGNITION SOFTWARE.  OCCASIONALLY THIS SOFTWARE WILL MISINTERPRET WORDS OR PHRASES.      Primary Care Provider Appointment - Kezia SALGUERO [facility] ([facility])      Patient ID: Andra Newell is a 62 y.o. female.    ASSESSMENT/PLAN by Problem List:  Problem List Items Addressed This Visit     Living-donor kidney transplant 1/14/2019 (Chronic)     Nephrologist Dalton Weiss,   She follows with the transplant clinic at [facility] on the [place].  She remains on immunosuppression managed by the transplant team.  Overall she appears to be doing very well in this regard           Long-term use of immunosuppressant medication (Chronic)     She remains on Prograf and is doing well.           Controlled type 2 diabetes mellitus with complication, with long-term current use of insulin (Chronic)     Chronic condition.  This has been reasonable with most recent A1c at 7.7% about four months ago.  She remains on metformin extended release 500 b.i.d., GFR remains adequate to continue this  Tresiba 80 units daily (AM)  She is on NovoLog usually 35 units with meals up to 3 times a day.  But in the last several days after her back procedure she has had to cut back because she has not been eating as well typically taking 30 units in the morning but will continue to monitor closely             Relevant Orders    Hemoglobin A1C    Mixed diabetic hyperlipidemia associated with type 2 diabetes mellitus (Chronic)     Metabolic syndrome pattern with low HDL, elevated triglycerides.  She remains on 10 mg rosuvastatin.           Relevant Orders    Lipid Panel    Hypertension associated with stage 3 chronic kidney disease due to type 2 diabetes mellitus (Chronic)     Blood pressure appears satisfactory.           Relevant Orders    Comprehensive Metabolic Panel    Mild intermittent asthma without complication (Chronic)     Patient is uncertain about this diagnosis.   Not really sure that she has asthma.  As best I can tell from history it sounds like she is prone to wheezing with respiratory infections or episodes of bronchitis but has not required any regular maintenance therapy and has had no frequent exacerbations.  No history of smoking.           Acquired hypothyroidism (Chronic)     Currently on 75 mcg levothyroxine.  TSH appears to be relatively stable over the last few years           Relevant Orders    TSH    Anemia of chronic disease (Chronic)     Per patient's history and problem list she has a diagnosis of anemia of chronic disease, at one point was diagnosed with aplastic anemia, most recent CBC is normal.           Aplastic anemia with parvovirus B19 infection 4/2019 (Chronic)    MDD (major depressive disorder), recurrent episode, mild (Chronic)     Reviewed history.  Overall she is stable.She rem on Viibryd.  Will explore in more detail at her 2nd upcoming appointment           Bilateral low back pain without sciatica (Chronic)     She reports significant chronic low back pain.  She recently had a pain pump inserted and is recovering.  She follows with Dr. Ruben Nunez           Hypomagnesemia - Primary (Chronic)    Secondary hyperparathyroidism of renal origin (Chronic)    Relevant Orders    PTH, Intact    Vitamin D          Follow Up:  Two months    Fifty-five minutes of total time spent on the encounter, which includes face to face time and non-face to face time preparing to see the patient (eg, review of tests), Obtaining and/or reviewing separately obtained history, Documenting clinical information in the electronic or other health record, Independently interpreting results (not separately reported) and communicating results to the patient/family/caregiver, or Care coordination (not separately reported).    Health Maintenance       Date Due Completion Date    Shingles Vaccine (1 of 2) Never done ---    Eye Exam 11/18/2021 11/18/2020    COVID-19 Vaccine (3 -  Booster for Pfizer series) 12/14/2021 7/14/2021    Foot Exam 01/08/2022 1/8/2021    Override on 10/2/2019: Done    Hemoglobin A1c 05/17/2022 11/17/2021    Lipid Panel 06/03/2022 6/3/2021    Mammogram 08/05/2022 8/5/2021    Diabetes Urine Screening 11/17/2022 11/17/2021    Low Dose Statin 03/31/2023 3/31/2022    Cervical Cancer Screening 09/04/2023 9/4/2018    Colorectal Cancer Screening 05/09/2024 5/9/2019    TETANUS VACCINE 07/12/2027 7/12/2017              Subjective:     Chief Complaint   Patient presents with    Establish Care     Here to establish care, former pt of Dr. Blevins     I have reviewed the information entered by the ancillary staff regarding the chief complaint as well as the related history.    HPI    Patient is a/an 62 y.o.  female   RISK of ADMIT/ED: 39    She presents today with her partner to establish new PCP in [place].  Previously followed with [facility] Yee in [place] but Dr. Blevins has moved.  She has a complicated history.  I have spent considerable time reviewing her chart.  Overall she appears to be doing well and stable.  I have addressed some of her chronic conditions today as stated above.  Will be bringing her back in a few months to continue to explore her history and make certain she is up-to-date.      Active Ambulatory Problems     Diagnosis Date Noted    Controlled type 2 diabetes mellitus with complication, with long-term current use of insulin 09/27/2013    Left hip pain 01/22/2014    Pain in both knees 01/22/2014    Hip arthritis 09/15/2014    MDD (major depressive disorder), recurrent episode, mild 01/21/2015    MRSA (methicillin resistant Staphylococcus aureus) carrier 09/16/2015    Rotator cuff syndrome of left shoulder 11/02/2015    Ulnar neuropathy of right upper extremity 08/29/2016    Bilateral low back pain without sciatica 03/27/2017    Anemia of chronic disease 07/12/2017    DJD (degenerative joint disease) 07/12/2017    Diverticulosis  of large intestine without hemorrhage 09/06/2017    Gastroesophageal reflux disease without esophagitis 07/30/2018    Living-donor kidney transplant 1/14/2019 01/15/2019    Long-term use of immunosuppressant medication 01/15/2019    At risk for opportunistic infections 01/15/2019    Vitamin D deficiency 01/15/2019    Disequilibrium 02/03/2019    Chronic anemia 02/11/2019    Hypomagnesemia 02/12/2019    Lumbar stenosis with neurogenic claudication 04/05/2019    Parvovirus B19 infection 05/09/2019    Aplastic anemia with parvovirus B19 infection 4/2019 05/27/2019    Obesity (BMI 30-39.9) 06/28/2019    Other spondylosis, lumbar region 09/25/2019    Mixed diabetic hyperlipidemia associated with type 2 diabetes mellitus 11/19/2019    Hypertension associated with stage 3 chronic kidney disease due to type 2 diabetes mellitus 11/19/2019    Acquired hypothyroidism 11/19/2019    Primary osteoarthritis involving multiple joints 11/19/2019    History of colon polyps 11/19/2019    Secondary hyperparathyroidism of renal origin 11/19/2019    Mild intermittent asthma without complication 11/19/2019    Chronic left shoulder pain 11/19/2019    Immunosuppression 12/10/2019    Benzodiazepine dependence, continuous 12/10/2019    Abdominal pain due to injury 06/23/2020    Vertigo 06/02/2021    Abnormal CT of liver 06/10/2021    New daily persistent headache 02/14/2022    NAFLD (nonalcoholic fatty liver disease) 03/14/2022     Resolved Ambulatory Problems     Diagnosis Date Noted    Osteitis pubis 01/18/2013    Knee pain 05/13/2013    Tear of acetabular labrum 09/24/2013    Femoroacetabular impingement of left hip 10/03/2013    Surgery follow-up, arthroscopic hip synovectomy, labrum debridement, bursectomy, adductor longus tendon sheeth arthrocentesis 10/16/2013    Complete tear of left rotator cuff 10/19/2015    Biceps tendinitis on left 11/02/2015    Neck pain, acute 08/29/2016    Interstitial  nephritis 03/27/2017    SACHIN on CKD 4 03/27/2017    Fatigue     Uncontrolled type 2 diabetes mellitus with hyperglycemia     Hyperglycemia 04/12/2017    Hyperkalemia 04/12/2017    Organ transplant candidate 07/12/2017    ESRD on dialysis     Proteinuria 07/12/2017    Intestinal strongyloidiasis 08/03/2017    Hypercalcemia 04/02/2018    Dysuria 04/02/2018    Exertional dyspnea 04/02/2018    Low vitamin D level 08/27/2018    BMI 36.0-36.9,adult 11/23/2018    Adverse effect of adrenal cortical steroids, sequela 01/15/2019    Metabolic acidosis 01/15/2019    Presence of surgical incision 01/15/2019    Hypophosphatemia 01/17/2019    Acute blood loss anemia 01/17/2019    Thrombocytopenia, unspecified 01/17/2019    Near syncope 02/04/2019    UTI (urinary tract infection) 04/02/2019    Fever 04/02/2019    Diarrhea 04/04/2019    History of recurrent urinary tract infection 04/12/2019    Abdominal pain     Thrush 05/06/2019    Neutropenia, unspecified 05/08/2019    Frequent UTI 11/19/2019    Severe obesity (BMI 35.0-35.9 with comorbidity) 02/04/2020    Stage 4 chronic kidney disease 09/22/2020     Past Medical History:   Diagnosis Date    Anemia     Anemia in chronic kidney disease, on chronic dialysis 7/12/2017    Anxiety and depression     Arthritis     Asthma     CKD stage III (moderate) 2/18/2019    Colon polyp     Depression     Diabetes mellitus     Diverticulosis     Encounter for blood transfusion     Eosinophilia     ESRD (end stage renal disease)     GERD (gastroesophageal reflux disease)     Gout     Gout     Hyperlipidemia     Hypertension     Hypertriglyceridemia without hypercholesterolemia 6/9/2019    Low back pain     Low HDL (under 40) 6/9/2019    MRSA carrier     Obesity     Renal disorder     Rotator cuff syndrome--left     Thyroid disease        Past Surgical History:   Procedure Laterality Date    ACHILLES TENDON SURGERY Left May 2015    BACK  "SURGERY      CERVICAL SPINE SURGERY  2021    cervical gate placed by asim Patel, and "gate"    CHOLECYSTECTOMY      COLONOSCOPY N/A 9/6/2017    Procedure: COLONOSCOPY;  Surgeon: Marisol Bryson MD;  Location: [facility] ENDO;  Service: Endoscopy;  Laterality: N/A; REPEAT IN 3 YEARS FOR SURVEILLANCE    COLONOSCOPY N/A 5/9/2019    Procedure: COLONOSCOPY;  Surgeon: Fady Ewing MD;  Location: [facility] ENDO (2ND FLR);  Service: Endoscopy;  Laterality: N/A;    DIALYSIS FISTULA CREATION  12/2017    ESOPHAGOGASTRODUODENOSCOPY N/A 5/9/2019    Procedure: EGD (ESOPHAGOGASTRODUODENOSCOPY);  Surgeon: Fady Ewing MD;  Location: [facility] ENDO (2ND FLR);  Service: Endoscopy;  Laterality: N/A;    ESOPHAGOGASTRODUODENOSCOPY N/A 6/10/2021    Procedure: EGD (ESOPHAGOGASTRODUODENOSCOPY);  Surgeon: Marisol Bryson MD;  Location: [facility] ENDO;  Service: Endoscopy;  Laterality: N/A;    HEMORRHOID SURGERY      INJECTION OF ANESTHETIC AGENT AROUND MEDIAL BRANCH NERVES INNERVATING LUMBAR FACET JOINT Right 9/25/2019    Procedure: Block-nerve-medial branch-lumbar;  Surgeon: Yonny Ferrer MD;  Location: [facility] OR;  Service: Pain Management;  Laterality: Right;  L2, 3, 4,5     INJECTION OF ANESTHETIC AGENT AROUND MEDIAL BRANCH NERVES INNERVATING LUMBAR FACET JOINT Right 10/16/2019    Procedure: Block-nerve-medial branch-lumbar;  Surgeon: Yonny Ferrer MD;  Location: [facility] OR;  Service: Pain Management;  Laterality: Right;  L2, 3, 4,5     JOINT REPLACEMENT      left    KIDNEY TRANSPLANT N/A 1/14/2019    Procedure: TRANSPLANT, KIDNEY;  Surgeon: Roland Salazar MD;  Location: [facility] OR 2ND FLR;  Service: Transplant;  Laterality: N/A;    KNEE SURGERY      RADIOFREQUENCY ABLATION OF LUMBAR MEDIAL BRANCH NERVE AT SINGLE LEVEL Right 10/29/2019    Procedure: Radiofrequency Ablation, Nerve, Spinal, Lumbar, Medial Branch, 1 Level;  Surgeon: Yonny Ferrer MD;  Location: [facility] OR;  Service: Pain Management;  Laterality: Right;  L2, 3, 4, 5  burned at " 80 degrees C X 60 seconds each site X 2    SHOULDER ARTHROSCOPY      SHOULDER SURGERY      UPPER GASTROINTESTINAL ENDOSCOPY  ~2013    Dr. Moarlez       Past Medical History:   Diagnosis Date    Anemia     Anemia in chronic kidney disease, on chronic dialysis 7/12/2017    Anxiety and depression     Aplastic anemia with parvovirus B19 infection 5/27/2019    Arthritis     Asthma     allergic airway    CKD stage III (moderate) 2/18/2019    Colon polyp     Depression     Diabetes mellitus     Diverticulosis     Encounter for blood transfusion     Eosinophilia     ESRD (end stage renal disease)     stage V, due for living donor 9/2018    ESRD on dialysis     GERD (gastroesophageal reflux disease)     Gout     Gout     Hyperlipidemia     Hypertension     Hypertriglyceridemia without hypercholesterolemia 6/9/2019    Low back pain     Low HDL (under 40) 6/9/2019    MRSA carrier     Neutropenia, unspecified 5/8/2019    Obesity     Renal disorder     Rotator cuff syndrome--left     Thyroid disease     Ulnar neuropathy of right upper extremity     Uncontrolled type 2 diabetes mellitus with hyperglycemia        Social History     Socioeconomic History    Marital status: Significant Other     Spouse name: Any Alvarez    Number of children: 2    Highest education level: 12th grade   Occupational History    Occupation: retired     Comment:    Tobacco Use    Smoking status: Never Smoker    Smokeless tobacco: Never Used   Substance and Sexual Activity    Alcohol use: No     Alcohol/week: 0.0 standard drinks    Drug use: No    Sexual activity: Not Currently   Social History Narrative     female    Disabled since 2015 due to DDD and severe osteoarthritis of knee and hips    Previously worked as  at [employer]    Lives in residential 1 story home with partner, Any.     Has 2 boys, 1 lives in [place], 1 in [place].         Mormon:  [employer]    Hobbies: painting and crafts.       Social Determinants of Health     Financial Resource Strain: Low Risk     Difficulty of Paying Living Expenses: Not hard at all   Food Insecurity: No Food Insecurity    Worried About Running Out of Food in the Last Year: Never true    Ran Out of Food in the Last Year: Never true   Transportation Needs: No Transportation Needs    Lack of Transportation (Medical): No    Lack of Transportation (Non-Medical): No   Physical Activity: Inactive    Days of Exercise per Week: 0 days    Minutes of Exercise per Session: 0 min   Stress: Stress Concern Present    Feeling of Stress : To some extent   Social Connections: Moderately Isolated    Frequency of Communication with Friends and Family: Twice a week    Frequency of Social Gatherings with Friends and Family: Twice a week    Attends Amish Services: Never    Active Member of Clubs or Organizations: No    Attends Club or Organization Meetings: Never    Marital Status: Living with partner   Housing Stability: Low Risk     Unable to Pay for Housing in the Last Year: No    Number of Places Lived in the Last Year: 1    Unstable Housing in the Last Year: No       Review of Systems   Constitutional: Positive for activity change and appetite change (Recent decrease in appetite after her pain pump placement.).   Respiratory: Negative.    Cardiovascular: Negative.    Gastrointestinal: Negative.    Musculoskeletal: Positive for arthralgias and back pain.       Objective     Physical Exam  Vitals and nursing note reviewed.   Constitutional:       General: She is not in acute distress.     Appearance: She is well-developed. She is obese.   Cardiovascular:      Rate and Rhythm: Normal rate and regular rhythm.      Pulses:           Dorsalis pedis pulses are 2+ on the right side and 2+ on the left side.        Posterior tibial pulses are 1+ on the right side and 1+ on the left side.      Heart sounds: Normal heart sounds.  "  Pulmonary:      Effort: Pulmonary effort is normal. No respiratory distress.      Breath sounds: Normal breath sounds. No wheezing.   Feet:      Right foot:      Protective Sensation: 6 sites tested. 6 sites sensed.      Skin integrity: No ulcer or skin breakdown.      Left foot:      Protective Sensation: 6 sites tested. 6 sites sensed.      Skin integrity: No ulcer or skin breakdown.      Comments: There is no skin breakdown, no wounds no ulcerations.  Skin:     Capillary Refill: Capillary refill takes less than 2 seconds.   Neurological:      Mental Status: She is alert and oriented to person, place, and time. Mental status is at baseline.      Coordination: Coordination normal.      Gait: Gait normal.   Psychiatric:         Behavior: Behavior normal.         Thought Content: Thought content normal.         Judgment: Judgment normal.       Vitals:    03/31/22 1253   BP: 138/80   BP Location: Right arm   Patient Position: Sitting   BP Method: Medium (Manual)   Pulse: 82   Resp: 18   SpO2: 98%   Weight: 100.2 kg (220 lb 14.4 oz)   Height: 5' 7" (1.702 m)       RECENT LABS:    Lab Results   Component Value Date    WBC 11.04 03/09/2022    HGB 12.3 03/09/2022    HCT 38.8 03/09/2022     03/09/2022    CHOL 144 06/03/2021    TRIG 322 (H) 06/03/2021    HDL 26 (L) 06/03/2021    ALT 47 (H) 03/09/2022    AST 37 03/09/2022     03/09/2022    K 4.3 03/09/2022     03/09/2022    CREATININE 1.0 03/09/2022    BUN 21 03/09/2022    CO2 25 03/09/2022    TSH 0.658 11/17/2021    INR 1.1 05/07/2019    HGBA1C 7.7 (H) 11/17/2021    HGBA1C 7.7 (H) 11/17/2021       Results for orders placed or performed in visit on 03/09/22   Comprehensive Metabolic Panel   Result Value Ref Range    Sodium 140 136 - 145 mmol/L    Potassium 4.3 3.5 - 5.1 mmol/L    Chloride 102 95 - 110 mmol/L    CO2 25 23 - 29 mmol/L    Glucose 188 (H) 70 - 110 mg/dL    BUN 21 8 - 23 mg/dL    Creatinine 1.0 0.5 - 1.4 mg/dL    Calcium 9.7 8.7 - 10.5 mg/dL "    Total Protein 8.2 6.0 - 8.4 g/dL    Albumin 3.9 3.5 - 5.2 g/dL    Total Bilirubin 0.3 0.1 - 1.0 mg/dL    Alkaline Phosphatase 153 (H) 55 - 135 U/L    AST 37 10 - 40 U/L    ALT 47 (H) 10 - 44 U/L    Anion Gap 13 8 - 16 mmol/L    eGFR if African American >60.0 >60 mL/min/1.73 m^2    eGFR if non  59.7 (A) >60 mL/min/1.73 m^2   Antimitochondrial Antibody   Result Value Ref Range    Anti-Mitochon Ab IFA Negative 1:40 Negative   Alpha 1 Antitrypsin Defiiciency Profile   Result Value Ref Range    A1AT Proteotype S/Z, LC-MS/MS SEE BELOW     Alpha-1 Anti-Trypsin 162 100 - 190 mg/dL   LEXY Screen w/Reflex   Result Value Ref Range    LEXY Screen Negative <1:80 Negative <1:80   Anti-Smooth Muscle Antibody   Result Value Ref Range    Smooth Muscle Ab Negative 1:40 Negative   CBC Auto Differential   Result Value Ref Range    WBC 11.04 3.90 - 12.70 K/uL    RBC 4.41 4.00 - 5.40 M/uL    Hemoglobin 12.3 12.0 - 16.0 g/dL    Hematocrit 38.8 37.0 - 48.5 %    MCV 88 82 - 98 fL    MCH 27.9 27.0 - 31.0 pg    MCHC 31.7 (L) 32.0 - 36.0 g/dL    RDW 14.3 11.5 - 14.5 %    Platelets 283 150 - 450 K/uL    MPV 9.7 9.2 - 12.9 fL    Immature Granulocytes 0.5 0.0 - 0.5 %    Gran # (ANC) 8.1 (H) 1.8 - 7.7 K/uL    Immature Grans (Abs) 0.05 (H) 0.00 - 0.04 K/uL    Lymph # 1.6 1.0 - 4.8 K/uL    Mono # 0.6 0.3 - 1.0 K/uL    Eos # 0.7 (H) 0.0 - 0.5 K/uL    Baso # 0.06 0.00 - 0.20 K/uL    nRBC 0 0 /100 WBC    Gran % 72.9 38.0 - 73.0 %    Lymph % 14.3 (L) 18.0 - 48.0 %    Mono % 5.6 4.0 - 15.0 %    Eosinophil % 6.2 0.0 - 8.0 %    Basophil % 0.5 0.0 - 1.9 %    Differential Method Automated    Ferritin   Result Value Ref Range    Ferritin 90 20.0 - 300.0 ng/mL   Hepatitis B Surface Antigen   Result Value Ref Range    Hepatitis B Surface Ag Negative Negative   Hepatitis C Antibody   Result Value Ref Range    Hepatitis C Ab Negative Negative     *Note: Due to a large number of results and/or encounters for the requested time period, some results  have not been displayed. A complete set of results can be found in Results Review.

## 2022-03-31 NOTE — ASSESSMENT & PLAN NOTE
Patient is uncertain about this diagnosis.  Not really sure that she has asthma.  As best I can tell from history it sounds like she is prone to wheezing with respiratory infections or episodes of bronchitis but has not required any regular maintenance therapy and has had no frequent exacerbations.  No history of smoking.

## 2022-03-31 NOTE — ASSESSMENT & PLAN NOTE
She reports significant chronic low back pain.  She recently had a pain pump inserted and is recovering.  She follows with Dr. Ruben Nunez

## 2022-03-31 NOTE — ASSESSMENT & PLAN NOTE
Reviewed history.  Overall she is stable.She rem on Viibryd.  Will explore in more detail at her 2nd upcoming appointment

## 2022-03-31 NOTE — ASSESSMENT & PLAN NOTE
Per patient's history and problem list she has a diagnosis of anemia of chronic disease, at one point was diagnosed with aplastic anemia, most recent CBC is normal.

## 2022-03-31 NOTE — ASSESSMENT & PLAN NOTE
Chronic condition.  This has been reasonable with most recent A1c at 7.7% about four months ago.  She remains on metformin extended release 500 b.i.d., GFR remains adequate to continue this  Tresiba 80 units daily (AM)  She is on NovoLog usually 35 units with meals up to 3 times a day.  But in the last several days after her back procedure she has had to cut back because she has not been eating as well typically taking 30 units in the morning but will continue to monitor closely

## 2022-04-12 ENCOUNTER — PATIENT MESSAGE (OUTPATIENT)
Dept: HEPATOLOGY | Facility: CLINIC | Age: 62
End: 2022-04-12
Payer: MEDICARE

## 2022-04-20 NOTE — PROCEDURES
FibroScan (Vibration Controlled Transient Elastography)    Date/Time: 3/9/2022 3:45 PM  Performed by: Mukesh Christiansen MD  Authorized by: Mukesh Christiansen MD     Diagnosis:  NAFLD    Probe:  XL    Universal Protocol: Patient's identity, procedure and site were verified, confirmatory pause was performed.  Discussed procedure including risks and potential complications.  Questions answered.  Patient verbalizes understanding and wishes to proceed with VCTE.     Procedure: After providing explanations of the procedure, patient was placed in the supine position with right arm in maximum abduction to allow optimal exposure of right lateral abdomen.  Patient was briefly assessed, Testing was performed in the mid-axillary location, 50Hz Shear Wave pulses were applied and the resulting Shear Wave and Propagation Speed detected with a 3.5 MHz ultrasonic signal, using the FibroScan probe, Skin to liver capsule distance and liver parenchyma were accessed during the entire examination with the FibroScan probe, Patient was instructed to breathe normally and to abstain from sudden movements during the procedure, allowing for random measurements of liver stiffness. At least 10 Shear Waves were produced, Individual measurements of each Shear Wave were calculated.  Patient tolerated the procedure well with no complications.  Meets discharge criteria as was dismissed.  Rates pain 0 out of 10.  Patient will follow up with ordering provider to review results.      Findings  Median liver stiffness score:  20.4  CAP Reading: dB/m:  366    IQR/med %:  9  Interpretation  Fibrosis interpretation is based on medial liver stiffness - Kilopascal (kPa).    Fibrosis Stage:  F4  Steatosis interpretation is based on controlled attenuation parameter - (dB/m).    Steatosis Grade:  S3

## 2022-04-25 DIAGNOSIS — K21.9 GASTROESOPHAGEAL REFLUX DISEASE WITHOUT ESOPHAGITIS: ICD-10-CM

## 2022-04-26 RX ORDER — FAMOTIDINE 40 MG/1
40 TABLET, FILM COATED ORAL NIGHTLY
Qty: 30 TABLET | Refills: 6 | Status: SHIPPED | OUTPATIENT
Start: 2022-04-26 | End: 2022-12-13

## 2022-04-27 ENCOUNTER — PATIENT OUTREACH (OUTPATIENT)
Dept: ADMINISTRATIVE | Facility: OTHER | Age: 62
End: 2022-04-27
Payer: MEDICARE

## 2022-04-27 ENCOUNTER — OFFICE VISIT (OUTPATIENT)
Dept: ENDOCRINOLOGY | Facility: CLINIC | Age: 62
End: 2022-04-27
Payer: MEDICARE

## 2022-04-27 VITALS
HEART RATE: 72 BPM | SYSTOLIC BLOOD PRESSURE: 126 MMHG | HEIGHT: 67 IN | DIASTOLIC BLOOD PRESSURE: 68 MMHG | BODY MASS INDEX: 35.38 KG/M2 | WEIGHT: 225.44 LBS

## 2022-04-27 DIAGNOSIS — Z79.4 TYPE 2 DIABETES MELLITUS WITH STAGE 5 CHRONIC KIDNEY DISEASE NOT ON CHRONIC DIALYSIS, WITH LONG-TERM CURRENT USE OF INSULIN: Primary | ICD-10-CM

## 2022-04-27 DIAGNOSIS — E03.9 ACQUIRED HYPOTHYROIDISM: ICD-10-CM

## 2022-04-27 DIAGNOSIS — E78.5 DYSLIPIDEMIA: ICD-10-CM

## 2022-04-27 DIAGNOSIS — E11.22 TYPE 2 DIABETES MELLITUS WITH STAGE 5 CHRONIC KIDNEY DISEASE NOT ON CHRONIC DIALYSIS, WITH LONG-TERM CURRENT USE OF INSULIN: Primary | ICD-10-CM

## 2022-04-27 DIAGNOSIS — I10 HYPERTENSION, UNSPECIFIED TYPE: ICD-10-CM

## 2022-04-27 DIAGNOSIS — Z94.0 S/P KIDNEY TRANSPLANT: ICD-10-CM

## 2022-04-27 DIAGNOSIS — Z79.4 CONTROLLED TYPE 2 DIABETES MELLITUS WITH COMPLICATION, WITH LONG-TERM CURRENT USE OF INSULIN: ICD-10-CM

## 2022-04-27 DIAGNOSIS — E11.8 CONTROLLED TYPE 2 DIABETES MELLITUS WITH COMPLICATION, WITH LONG-TERM CURRENT USE OF INSULIN: ICD-10-CM

## 2022-04-27 DIAGNOSIS — N18.5 TYPE 2 DIABETES MELLITUS WITH STAGE 5 CHRONIC KIDNEY DISEASE NOT ON CHRONIC DIALYSIS, WITH LONG-TERM CURRENT USE OF INSULIN: Primary | ICD-10-CM

## 2022-04-27 PROCEDURE — 99214 OFFICE O/P EST MOD 30 MIN: CPT | Mod: 25,S$GLB,, | Performed by: NURSE PRACTITIONER

## 2022-04-27 PROCEDURE — 95251 PR GLUCOSE MONITOR, 72 HOUR, PHYS INTERP: ICD-10-PCS | Mod: S$GLB,,, | Performed by: NURSE PRACTITIONER

## 2022-04-27 PROCEDURE — 3008F BODY MASS INDEX DOCD: CPT | Mod: CPTII,S$GLB,, | Performed by: NURSE PRACTITIONER

## 2022-04-27 PROCEDURE — 95251 CONT GLUC MNTR ANALYSIS I&R: CPT | Mod: S$GLB,,, | Performed by: NURSE PRACTITIONER

## 2022-04-27 PROCEDURE — 3074F PR MOST RECENT SYSTOLIC BLOOD PRESSURE < 130 MM HG: ICD-10-PCS | Mod: CPTII,S$GLB,, | Performed by: NURSE PRACTITIONER

## 2022-04-27 PROCEDURE — 4010F PR ACE/ARB THEARPY RXD/TAKEN: ICD-10-PCS | Mod: CPTII,S$GLB,, | Performed by: NURSE PRACTITIONER

## 2022-04-27 PROCEDURE — 3078F PR MOST RECENT DIASTOLIC BLOOD PRESSURE < 80 MM HG: ICD-10-PCS | Mod: CPTII,S$GLB,, | Performed by: NURSE PRACTITIONER

## 2022-04-27 PROCEDURE — 3051F HG A1C>EQUAL 7.0%<8.0%: CPT | Mod: CPTII,S$GLB,, | Performed by: NURSE PRACTITIONER

## 2022-04-27 PROCEDURE — 3008F PR BODY MASS INDEX (BMI) DOCUMENTED: ICD-10-PCS | Mod: CPTII,S$GLB,, | Performed by: NURSE PRACTITIONER

## 2022-04-27 PROCEDURE — 3074F SYST BP LT 130 MM HG: CPT | Mod: CPTII,S$GLB,, | Performed by: NURSE PRACTITIONER

## 2022-04-27 PROCEDURE — 99999 PR PBB SHADOW E&M-EST. PATIENT-LVL IV: CPT | Mod: PBBFAC,,, | Performed by: NURSE PRACTITIONER

## 2022-04-27 PROCEDURE — 99999 PR PBB SHADOW E&M-EST. PATIENT-LVL IV: ICD-10-PCS | Mod: PBBFAC,,, | Performed by: NURSE PRACTITIONER

## 2022-04-27 PROCEDURE — 99214 PR OFFICE/OUTPT VISIT, EST, LEVL IV, 30-39 MIN: ICD-10-PCS | Mod: 25,S$GLB,, | Performed by: NURSE PRACTITIONER

## 2022-04-27 PROCEDURE — 3066F NEPHROPATHY DOC TX: CPT | Mod: CPTII,S$GLB,, | Performed by: NURSE PRACTITIONER

## 2022-04-27 PROCEDURE — 1160F PR REVIEW ALL MEDS BY PRESCRIBER/CLIN PHARMACIST DOCUMENTED: ICD-10-PCS | Mod: CPTII,S$GLB,, | Performed by: NURSE PRACTITIONER

## 2022-04-27 PROCEDURE — 1159F PR MEDICATION LIST DOCUMENTED IN MEDICAL RECORD: ICD-10-PCS | Mod: CPTII,S$GLB,, | Performed by: NURSE PRACTITIONER

## 2022-04-27 PROCEDURE — 3051F PR MOST RECENT HEMOGLOBIN A1C LEVEL 7.0 - < 8.0%: ICD-10-PCS | Mod: CPTII,S$GLB,, | Performed by: NURSE PRACTITIONER

## 2022-04-27 PROCEDURE — 4010F ACE/ARB THERAPY RXD/TAKEN: CPT | Mod: CPTII,S$GLB,, | Performed by: NURSE PRACTITIONER

## 2022-04-27 PROCEDURE — 3078F DIAST BP <80 MM HG: CPT | Mod: CPTII,S$GLB,, | Performed by: NURSE PRACTITIONER

## 2022-04-27 PROCEDURE — 3066F PR DOCUMENTATION OF TREATMENT FOR NEPHROPATHY: ICD-10-PCS | Mod: CPTII,S$GLB,, | Performed by: NURSE PRACTITIONER

## 2022-04-27 PROCEDURE — 1159F MED LIST DOCD IN RCRD: CPT | Mod: CPTII,S$GLB,, | Performed by: NURSE PRACTITIONER

## 2022-04-27 PROCEDURE — 1160F RVW MEDS BY RX/DR IN RCRD: CPT | Mod: CPTII,S$GLB,, | Performed by: NURSE PRACTITIONER

## 2022-04-27 NOTE — PROGRESS NOTES
Health Maintenance Due   Topic Date Due    Shingles Vaccine (1 of 2) Never done    Eye Exam  11/18/2021    COVID-19 Vaccine (3 - Booster for Pfizer series) 12/14/2021     Updates were requested from care everywhere.  Chart was reviewed for overdue Proactive [facility] Encounters ([facility]) topics (CRS, Breast Cancer Screening, Eye exam)  Health Maintenance has been updated.  [place] immunization registry triggered.  Immunizations were reconciled.

## 2022-04-27 NOTE — PROGRESS NOTES
"Subjective:       Patient ID: Andra Newell is a 62 y.o. female.    Chief Complaint: Diabetes    HPI  Pt is a 62 y.o. wf with a diagnosis of Type 2 diabetes mellitus diagnosed approximately 2005, as well as chronic conditions pending review including HTN, HLP and hypothyroidism. .  Other pertinent medical and social information noted includes, but not limited to: Pt also with ESRD s/p KTS in Jan 2019.  .Prior supervisor for [employer], and also . Very pleasant, high functioning.       Interim Events: Pt of PING Webber NP--new to me.  She did have a pain pump for back, placed March 22, 2022 with f/u to ER for site redness and drainage. Event though pt A1C is reasonable, she is concerned as glucoses are running in 300's at night. No c.o hypoglycemia. And with exception of chronic back pain no focal complaints.      Sensor Interpretation   Dates of review: 4/14-4/27/2022    Estimated A1C;7.9%    Percent of sensor utilization during review period:65%     10% Very high  49% High  41% Time in range  0% Low  0 % Very Low    Time in range parameters:   mg/dL     Prominent Theme/Finding:  Multiple gaps.  But grossly appears pt hovering in 180-200 range overnight, and often trends down to the lower 100 range during day.       [place] of Systems   Constitutional: Negative for activity change and fatigue.        Blood pressure 126/68, pulse 72, height 5' 7" (1.702 m), weight 102.3 kg (225 lb 6.7 oz), last menstrual period 02/28/2012.   HENT: Negative for hearing loss and trouble swallowing.    Eyes: Negative for photophobia and visual disturbance.        Last Eye Exam:    Respiratory: Negative for cough and shortness of breath.    Cardiovascular: Negative for chest pain and palpitations.   Gastrointestinal: Negative for constipation and diarrhea.   Genitourinary: Negative for frequency and urgency.   Musculoskeletal: Positive for back pain. Negative for arthralgias and myalgias.   Integumentary:  " "Negative for rash and wound.   Neurological: Negative for weakness and numbness.   Psychiatric/Behavioral: Negative for sleep disturbance. The patient is not nervous/anxious.          Objective:      Physical Exam  Constitutional:       Appearance: Normal appearance. She is obese.   HENT:      Head: Normocephalic and atraumatic.      Nose: Nose normal.   Eyes:      Extraocular Movements: Extraocular movements intact.      Conjunctiva/sclera: Conjunctivae normal.      Pupils: Pupils are equal, round, and reactive to light.   Neck:      Vascular: No carotid bruit.   Cardiovascular:      Rate and Rhythm: Normal rate and regular rhythm.      Pulses: Normal pulses.      Heart sounds: Murmur heard.   Pulmonary:      Effort: Pulmonary effort is normal.      Breath sounds: Normal breath sounds.   Musculoskeletal:         General: Normal range of motion.      Cervical back: Normal range of motion and neck supple.      Right lower leg: No edema.      Left lower leg: No edema.      Comments: Feet:    Back brace in place    Lymphadenopathy:      Cervical: No cervical adenopathy.   Skin:     General: Skin is warm and dry.   Neurological:      General: No focal deficit present.      Mental Status: She is alert and oriented to person, place, and time.   Psychiatric:         Mood and Affect: Mood normal.         Behavior: Behavior normal.         Thought Content: Thought content normal.         Judgment: Judgment normal.             /68 (BP Location: Right arm, Patient Position: Sitting, BP Method: X-Large (Manual))   Pulse 72   Ht 5' 7" (1.702 m)   Wt 102.3 kg (225 lb 6.7 oz)   LMP 02/28/2012   BMI 35.31 kg/m²     Hemoglobin A1C   Date Value Ref Range Status   11/17/2021 7.7 (H) 4.0 - 5.6 % Final     Comment:     ADA Screening Guidelines:  5.7-6.4%  Consistent with prediabetes  >or=6.5%  Consistent with diabetes    High levels of fetal hemoglobin interfere with the HbA1C  assay. Heterozygous hemoglobin variants (HbS, " HgC, etc)do  not significantly interfere with this assay.   However, presence of multiple variants may affect accuracy.     11/17/2021 7.7 (H) 4.0 - 5.6 % Final     Comment:     ADA Screening Guidelines:  5.7-6.4%  Consistent with prediabetes  >or=6.5%  Consistent with diabetes    High levels of fetal hemoglobin interfere with the HbA1C  assay. Heterozygous hemoglobin variants (HbS, HgC, etc)do  not significantly interfere with this assay.   However, presence of multiple variants may affect accuracy.     05/18/2021 7.9 (H) 4.0 - 5.6 % Final     Comment:     ADA Screening Guidelines:  5.7-6.4%  Consistent with prediabetes  >or=6.5%  Consistent with diabetes    High levels of fetal hemoglobin interfere with the HbA1C  assay. Heterozygous hemoglobin variants (HbS, HgC, etc)do  not significantly interfere with this assay.   However, presence of multiple variants may affect accuracy.         Chemistry        Component Value Date/Time     04/09/2022 1544    K 4.2 04/09/2022 1544     04/09/2022 1544    CO2 30 04/09/2022 1544    BUN 15 04/09/2022 1544    CREATININE 0.82 04/09/2022 1544     (H) 04/09/2022 1544        Component Value Date/Time    CALCIUM 9.5 04/09/2022 1544    ALKPHOS 196 (H) 04/09/2022 1544    AST 47 (H) 04/09/2022 1544    ALT 47 (H) 04/09/2022 1544    BILITOT 0.2 04/09/2022 1544    ESTGFRAFRICA >60 04/09/2022 1544    EGFRNONAA >60 04/09/2022 1544          Lab Results   Component Value Date    CHOL 144 06/03/2021     Lab Results   Component Value Date    HDL 26 (L) 06/03/2021     Lab Results   Component Value Date    LDLCALC 53.6 (L) 06/03/2021     Lab Results   Component Value Date    TRIG 322 (H) 06/03/2021     Lab Results   Component Value Date    CHOLHDL 18.1 (L) 06/03/2021     Lab Results   Component Value Date    MICALBCREAT 10.6 11/17/2021     Lab Results   Component Value Date    TSH 0.658 11/17/2021     Vit D, 25-Hydroxy   Date Value Ref Range Status   11/17/2021 42 30 - 96 ng/mL  Final     Comment:     Vitamin D deficiency.........<10 ng/mL                              Vitamin D insufficiency......10-29 ng/mL       Vitamin D sufficiency........> or equal to 30 ng/mL  Vitamin D toxicity............>100 ng/mL         Assessment:       1. Type 2 diabetes mellitus with stage 5 chronic kidney disease not on chronic dialysis, with long-term current use of insulin     2. Controlled type 2 diabetes mellitus with complication, with long-term current use of insulin  Ambulatory referral/consult to Endocrinology   3. Acquired hypothyroidism     4. Hypertension, unspecified type     5. Dyslipidemia     6. S/P kidney transplant         Plan:       On levo .75  On crestor 10  On losartan 50     Increase Tresiba from 80 to 90 units qam  Decrease Novolog from 36-36-36- to 30-30-30 plus ss  (150-200=+3 etc).      Can call in interim.  Sharing [place]     ORDERS 04/27/2022   3 mo with  No labs.

## 2022-05-11 ENCOUNTER — PATIENT MESSAGE (OUTPATIENT)
Dept: RESEARCH | Facility: CLINIC | Age: 62
End: 2022-05-11
Payer: MEDICARE

## 2022-05-14 ENCOUNTER — PATIENT MESSAGE (OUTPATIENT)
Dept: ENDOCRINOLOGY | Facility: CLINIC | Age: 62
End: 2022-05-14
Payer: MEDICARE

## 2022-05-16 NOTE — TELEPHONE ENCOUNTER
Please see pt's email.  She's asking to increase the Novolog Rx but your last note said to decrease the Novolog.  Please send new Rx if you agree to increase.

## 2022-05-17 RX ORDER — INSULIN ASPART 100 [IU]/ML
INJECTION, SOLUTION INTRAVENOUS; SUBCUTANEOUS
Qty: 50 ML | Refills: 11 | Status: SHIPPED | OUTPATIENT
Start: 2022-05-17 | End: 2022-05-18

## 2022-05-18 RX ORDER — INSULIN ASPART 100 [IU]/ML
INJECTION, SOLUTION INTRAVENOUS; SUBCUTANEOUS
Qty: 18 ML | Refills: 11 | Status: SHIPPED | OUTPATIENT
Start: 2022-05-18 | End: 2022-11-27 | Stop reason: SDUPTHER

## 2022-05-18 NOTE — TELEPHONE ENCOUNTER
----- Message from Aster Perrin sent at 5/18/2022 11:05 AM CDT -----  Contact: Noemí  Type: Needs Medical Advice    Who Called: Noemí Holder  Best Call Back Number: [phone]  Additional  Information: Needs clarification on RX Novolog that was sent over for pt should be Flex Pen instead. Please give a call back  Please Advise- Thank you

## 2022-05-19 ENCOUNTER — LAB VISIT (OUTPATIENT)
Dept: LAB | Facility: HOSPITAL | Age: 62
End: 2022-05-19
Attending: FAMILY MEDICINE
Payer: MEDICARE

## 2022-05-19 DIAGNOSIS — N18.30 HYPERTENSION ASSOCIATED WITH STAGE 3 CHRONIC KIDNEY DISEASE DUE TO TYPE 2 DIABETES MELLITUS: ICD-10-CM

## 2022-05-19 DIAGNOSIS — E11.22 HYPERTENSION ASSOCIATED WITH STAGE 3 CHRONIC KIDNEY DISEASE DUE TO TYPE 2 DIABETES MELLITUS: ICD-10-CM

## 2022-05-19 DIAGNOSIS — E03.9 ACQUIRED HYPOTHYROIDISM: Chronic | ICD-10-CM

## 2022-05-19 DIAGNOSIS — E11.69 MIXED DIABETIC HYPERLIPIDEMIA ASSOCIATED WITH TYPE 2 DIABETES MELLITUS: Chronic | ICD-10-CM

## 2022-05-19 DIAGNOSIS — E83.42 HYPOMAGNESEMIA: ICD-10-CM

## 2022-05-19 DIAGNOSIS — E11.8 CONTROLLED TYPE 2 DIABETES MELLITUS WITH COMPLICATION, WITH LONG-TERM CURRENT USE OF INSULIN: Chronic | ICD-10-CM

## 2022-05-19 DIAGNOSIS — I12.9 HYPERTENSION ASSOCIATED WITH STAGE 3 CHRONIC KIDNEY DISEASE DUE TO TYPE 2 DIABETES MELLITUS: ICD-10-CM

## 2022-05-19 DIAGNOSIS — E78.2 MIXED DIABETIC HYPERLIPIDEMIA ASSOCIATED WITH TYPE 2 DIABETES MELLITUS: Chronic | ICD-10-CM

## 2022-05-19 DIAGNOSIS — Z79.4 CONTROLLED TYPE 2 DIABETES MELLITUS WITH COMPLICATION, WITH LONG-TERM CURRENT USE OF INSULIN: Chronic | ICD-10-CM

## 2022-05-19 DIAGNOSIS — N25.81 SECONDARY HYPERPARATHYROIDISM OF RENAL ORIGIN: ICD-10-CM

## 2022-05-19 LAB
ALBUMIN SERPL BCP-MCNC: 3.5 G/DL (ref 3.5–5.2)
ALP SERPL-CCNC: 116 U/L (ref 55–135)
ALT SERPL W/O P-5'-P-CCNC: 45 U/L (ref 10–44)
ANION GAP SERPL CALC-SCNC: 9 MMOL/L (ref 8–16)
AST SERPL-CCNC: 37 U/L (ref 10–40)
BILIRUB SERPL-MCNC: 0.4 MG/DL (ref 0.1–1)
BUN SERPL-MCNC: 15 MG/DL (ref 8–23)
CALCIUM SERPL-MCNC: 9.1 MG/DL (ref 8.7–10.5)
CHLORIDE SERPL-SCNC: 106 MMOL/L (ref 95–110)
CHOLEST SERPL-MCNC: 164 MG/DL (ref 120–199)
CHOLEST/HDLC SERPL: 6.3 {RATIO} (ref 2–5)
CO2 SERPL-SCNC: 26 MMOL/L (ref 23–29)
CREAT SERPL-MCNC: 1 MG/DL (ref 0.5–1.4)
EST. GFR  (AFRICAN AMERICAN): >60 ML/MIN/1.73 M^2
EST. GFR  (NON AFRICAN AMERICAN): 59.7 ML/MIN/1.73 M^2
ESTIMATED AVG GLUCOSE: 180 MG/DL (ref 68–131)
GLUCOSE SERPL-MCNC: 200 MG/DL (ref 70–110)
HBA1C MFR BLD: 7.9 % (ref 4–5.6)
HDLC SERPL-MCNC: 26 MG/DL (ref 40–75)
HDLC SERPL: 15.9 % (ref 20–50)
LDLC SERPL CALC-MCNC: 99.6 MG/DL (ref 63–159)
MAGNESIUM SERPL-MCNC: 1.7 MG/DL (ref 1.6–2.6)
NONHDLC SERPL-MCNC: 138 MG/DL
POTASSIUM SERPL-SCNC: 5.1 MMOL/L (ref 3.5–5.1)
PROT SERPL-MCNC: 7 G/DL (ref 6–8.4)
PTH-INTACT SERPL-MCNC: 153.4 PG/ML (ref 9–77)
SODIUM SERPL-SCNC: 141 MMOL/L (ref 136–145)
TRIGL SERPL-MCNC: 192 MG/DL (ref 30–150)
TSH SERPL DL<=0.005 MIU/L-ACNC: 0.93 UIU/ML (ref 0.4–4)

## 2022-05-19 PROCEDURE — 83036 HEMOGLOBIN GLYCOSYLATED A1C: CPT | Performed by: FAMILY MEDICINE

## 2022-05-19 PROCEDURE — 83735 ASSAY OF MAGNESIUM: CPT | Performed by: FAMILY MEDICINE

## 2022-05-19 PROCEDURE — 80061 LIPID PANEL: CPT | Performed by: FAMILY MEDICINE

## 2022-05-19 PROCEDURE — 84443 ASSAY THYROID STIM HORMONE: CPT | Performed by: FAMILY MEDICINE

## 2022-05-19 PROCEDURE — 80053 COMPREHEN METABOLIC PANEL: CPT | Performed by: FAMILY MEDICINE

## 2022-05-19 PROCEDURE — 83970 ASSAY OF PARATHORMONE: CPT | Performed by: FAMILY MEDICINE

## 2022-05-19 PROCEDURE — 36415 COLL VENOUS BLD VENIPUNCTURE: CPT | Mod: PN | Performed by: FAMILY MEDICINE

## 2022-05-26 ENCOUNTER — OFFICE VISIT (OUTPATIENT)
Dept: PRIMARY CARE CLINIC | Facility: CLINIC | Age: 62
End: 2022-05-26
Payer: MEDICARE

## 2022-05-26 VITALS
DIASTOLIC BLOOD PRESSURE: 74 MMHG | WEIGHT: 224.56 LBS | HEART RATE: 64 BPM | HEIGHT: 67 IN | RESPIRATION RATE: 18 BRPM | BODY MASS INDEX: 35.25 KG/M2 | SYSTOLIC BLOOD PRESSURE: 124 MMHG

## 2022-05-26 DIAGNOSIS — N18.31 STAGE 3A CHRONIC KIDNEY DISEASE: ICD-10-CM

## 2022-05-26 DIAGNOSIS — N25.81 SECONDARY HYPERPARATHYROIDISM OF RENAL ORIGIN: Chronic | ICD-10-CM

## 2022-05-26 DIAGNOSIS — N18.30 HYPERTENSION ASSOCIATED WITH STAGE 3 CHRONIC KIDNEY DISEASE DUE TO TYPE 2 DIABETES MELLITUS: Chronic | ICD-10-CM

## 2022-05-26 DIAGNOSIS — E11.8 CONTROLLED TYPE 2 DIABETES MELLITUS WITH COMPLICATION, WITH LONG-TERM CURRENT USE OF INSULIN: Chronic | ICD-10-CM

## 2022-05-26 DIAGNOSIS — Z79.4 CONTROLLED TYPE 2 DIABETES MELLITUS WITH COMPLICATION, WITH LONG-TERM CURRENT USE OF INSULIN: Chronic | ICD-10-CM

## 2022-05-26 DIAGNOSIS — D84.9 IMMUNOSUPPRESSION: Chronic | ICD-10-CM

## 2022-05-26 DIAGNOSIS — Z94.0 STATUS POST LIVING-DONOR KIDNEY TRANSPLANTATION: Primary | Chronic | ICD-10-CM

## 2022-05-26 DIAGNOSIS — I12.9 HYPERTENSION ASSOCIATED WITH STAGE 3 CHRONIC KIDNEY DISEASE DUE TO TYPE 2 DIABETES MELLITUS: Chronic | ICD-10-CM

## 2022-05-26 DIAGNOSIS — R29.818 SUSPECTED SLEEP APNEA: ICD-10-CM

## 2022-05-26 DIAGNOSIS — R13.10 DYSPHAGIA, UNSPECIFIED TYPE: ICD-10-CM

## 2022-05-26 DIAGNOSIS — J45.20 MILD INTERMITTENT ASTHMA WITHOUT COMPLICATION: Chronic | ICD-10-CM

## 2022-05-26 DIAGNOSIS — E11.22 HYPERTENSION ASSOCIATED WITH STAGE 3 CHRONIC KIDNEY DISEASE DUE TO TYPE 2 DIABETES MELLITUS: Chronic | ICD-10-CM

## 2022-05-26 DIAGNOSIS — Z79.60 LONG-TERM USE OF IMMUNOSUPPRESSANT MEDICATION: Chronic | ICD-10-CM

## 2022-05-26 DIAGNOSIS — F51.12 INSUFFICIENT SLEEP SYNDROME: ICD-10-CM

## 2022-05-26 PROCEDURE — 4010F PR ACE/ARB THEARPY RXD/TAKEN: ICD-10-PCS | Mod: CPTII,S$GLB,, | Performed by: FAMILY MEDICINE

## 2022-05-26 PROCEDURE — 3066F PR DOCUMENTATION OF TREATMENT FOR NEPHROPATHY: ICD-10-PCS | Mod: CPTII,S$GLB,, | Performed by: FAMILY MEDICINE

## 2022-05-26 PROCEDURE — 99999 PR PBB SHADOW E&M-EST. PATIENT-LVL V: ICD-10-PCS | Mod: PBBFAC,,, | Performed by: FAMILY MEDICINE

## 2022-05-26 PROCEDURE — 3008F PR BODY MASS INDEX (BMI) DOCUMENTED: ICD-10-PCS | Mod: CPTII,S$GLB,, | Performed by: FAMILY MEDICINE

## 2022-05-26 PROCEDURE — 3078F DIAST BP <80 MM HG: CPT | Mod: CPTII,S$GLB,, | Performed by: FAMILY MEDICINE

## 2022-05-26 PROCEDURE — 1159F PR MEDICATION LIST DOCUMENTED IN MEDICAL RECORD: ICD-10-PCS | Mod: CPTII,S$GLB,, | Performed by: FAMILY MEDICINE

## 2022-05-26 PROCEDURE — 3051F HG A1C>EQUAL 7.0%<8.0%: CPT | Mod: CPTII,S$GLB,, | Performed by: FAMILY MEDICINE

## 2022-05-26 PROCEDURE — 3066F NEPHROPATHY DOC TX: CPT | Mod: CPTII,S$GLB,, | Performed by: FAMILY MEDICINE

## 2022-05-26 PROCEDURE — 99214 PR OFFICE/OUTPT VISIT, EST, LEVL IV, 30-39 MIN: ICD-10-PCS | Mod: S$GLB,,, | Performed by: FAMILY MEDICINE

## 2022-05-26 PROCEDURE — 3051F PR MOST RECENT HEMOGLOBIN A1C LEVEL 7.0 - < 8.0%: ICD-10-PCS | Mod: CPTII,S$GLB,, | Performed by: FAMILY MEDICINE

## 2022-05-26 PROCEDURE — 1160F PR REVIEW ALL MEDS BY PRESCRIBER/CLIN PHARMACIST DOCUMENTED: ICD-10-PCS | Mod: CPTII,S$GLB,, | Performed by: FAMILY MEDICINE

## 2022-05-26 PROCEDURE — 1159F MED LIST DOCD IN RCRD: CPT | Mod: CPTII,S$GLB,, | Performed by: FAMILY MEDICINE

## 2022-05-26 PROCEDURE — 3078F PR MOST RECENT DIASTOLIC BLOOD PRESSURE < 80 MM HG: ICD-10-PCS | Mod: CPTII,S$GLB,, | Performed by: FAMILY MEDICINE

## 2022-05-26 PROCEDURE — 3074F PR MOST RECENT SYSTOLIC BLOOD PRESSURE < 130 MM HG: ICD-10-PCS | Mod: CPTII,S$GLB,, | Performed by: FAMILY MEDICINE

## 2022-05-26 PROCEDURE — 1160F RVW MEDS BY RX/DR IN RCRD: CPT | Mod: CPTII,S$GLB,, | Performed by: FAMILY MEDICINE

## 2022-05-26 PROCEDURE — 99999 PR PBB SHADOW E&M-EST. PATIENT-LVL V: CPT | Mod: PBBFAC,,, | Performed by: FAMILY MEDICINE

## 2022-05-26 PROCEDURE — 99214 OFFICE O/P EST MOD 30 MIN: CPT | Mod: S$GLB,,, | Performed by: FAMILY MEDICINE

## 2022-05-26 PROCEDURE — 3008F BODY MASS INDEX DOCD: CPT | Mod: CPTII,S$GLB,, | Performed by: FAMILY MEDICINE

## 2022-05-26 PROCEDURE — 4010F ACE/ARB THERAPY RXD/TAKEN: CPT | Mod: CPTII,S$GLB,, | Performed by: FAMILY MEDICINE

## 2022-05-26 PROCEDURE — 3074F SYST BP LT 130 MM HG: CPT | Mod: CPTII,S$GLB,, | Performed by: FAMILY MEDICINE

## 2022-05-26 NOTE — ASSESSMENT & PLAN NOTE
Creatinine appears stable.  She has not seen her nephrologist recently, Pilar Brown, nephrology in [place], she will schedule a follow-up and asked them to send me any updates

## 2022-05-26 NOTE — ASSESSMENT & PLAN NOTE
She has noticed difficulty swallowing since anesthesia after the pain pump.  She does have a history of Schatzki ring and dilation about one year ago however currently her swallowing trouble seems to be in the back of her throat with some discomfort and sensation that her throat is closing at times.  So I will recommend ENT consultation.

## 2022-05-26 NOTE — PROGRESS NOTES
THIS DOCUMENT WAS MADE IN PART WITH VOICE RECOGNITION SOFTWARE.  OCCASIONALLY THIS SOFTWARE WILL MISINTERPRET WORDS OR PHRASES.      Primary Care Provider Appointment - [facility],   [place] [facility] ([facility])      Patient ID: Andra Newell is a 62 y.o. female.    ASSESSMENT/PLAN by Problem List:  Problem List Items Addressed This Visit     Living-donor kidney transplant 1/14/2019 - Primary (Chronic)    Long-term use of immunosuppressant medication (Chronic)    Immunosuppression (Chronic)    Controlled type 2 diabetes mellitus with complication, with long-term current use of insulin (Chronic)     Mild increase in A1c at 7.9%.  She does continue to follow closely with Endocrinology.  Numbers are statically satisfactory however I would still like to see her A1c closer to seven.  So continue current medications and follow with Endocrinology.  Kidney function remains satisfactory to continue metformin at this time.  Insulin is managed by Endocrinology. 2 episodes of hypo when late for lunch.    Health maintenance due:  Eye exam.  Referral has been entered.  Has not yet been scheduled.  Will encourage her to do so.           Hypertension associated with stage 3 chronic kidney disease due to type 2 diabetes mellitus (Chronic)     Blood pressure remains stable and satisfactory           Secondary hyperparathyroidism of renal origin (Chronic)     Presume secondary to CKD.  No history of hypercalcemia.  I do recommend following back with her nephrologist for general nephrology care and evaluation.  If they do not feel this finding is related to kidney disease she may wish to consult with Endocrinology.           Stage 3a chronic kidney disease (Chronic)     Creatinine appears stable.  She has not seen her nephrologist recently, Pilar Brown, nephrology in [place], she will schedule a follow-up and asked them to send me any updates           Mild intermittent asthma without complication  (Chronic)    Dysphagia     She has noticed difficulty swallowing since anesthesia after the pain pump.  She does have a history of Schatzki ring and dilation about one year ago however currently her swallowing trouble seems to be in the back of her throat with some discomfort and sensation that her throat is closing at times.  So I will recommend ENT consultation.           Relevant Orders    Ambulatory referral/consult to ENT      Other Visit Diagnoses     Suspected sleep apnea        Relevant Orders    Ambulatory referral/consult to Sleep Disorders    Polysomnogram (CPAP will be added if patient meets diagnostic criteria.)    Insufficient sleep syndrome         Relevant Orders    Polysomnogram (CPAP will be added if patient meets diagnostic criteria.)   High suspicion for possible sleep apnea.    Symptoms justifying need for sleep study:  Evidence of daytime sleepiness not explained by other factor:   yes patient has this  Additional signs and symptoms:  HABITUAL SNORING, WITNESSED APNEA, INCREASED NECK CIRCUMFERENCE, VERY CROWDED PHARYNX, FATIGUE, DECREASED CONCENTRATION, NON RESTORATIVE SLEEP            Follow Up:  Three months      Health Maintenance       Date Due Completion Date    Shingles Vaccine (1 of 2) Never done ---    Eye Exam 11/18/2021 11/18/2020    COVID-19 Vaccine (3 - Booster for Pfizer series) 12/14/2021 7/14/2021    Mammogram 08/05/2022 8/5/2021    Diabetes Urine Screening 11/17/2022 11/17/2021    Hemoglobin A1c 11/19/2022 5/19/2022    Foot Exam 03/31/2023 3/31/2022    Override on 10/2/2019: Done    Low Dose Statin 04/27/2023 4/27/2022    Lipid Panel 05/19/2023 5/19/2022    Cervical Cancer Screening 09/04/2023 9/4/2018    Colorectal Cancer Screening 05/09/2024 5/9/2019    TETANUS VACCINE 07/12/2027 7/12/2017              Subjective:     Chief Complaint   Patient presents with    Diabetes     2 month f/u visit     Hypertension     2 month f/u visit     Hyperlipidemia     2 month f/u visit   "    I have reviewed the information entered by the ancillary staff regarding the chief complaint as well as the related history.    HPI    Patient is a/an 62 y.o.  female   RISK of ADMIT/ED: 39    Second follow-up with me, multiple conditions addressed today, see above for details.    Acute concerns:  Mouth dry, has notice swallowing difficutly since recent anesthesia for her pain pump placement.  She does have a history of a Schatzki ring and dilation but her current sensation is in the mouth and throat.  Occasionally feels like her throat is closing but she has had no difficulty breathing.    For complete problem list, past medical history, surgical history, social history, etc., see appropriate section in the electronic medical record    Review of Systems   Constitutional: Positive for fatigue. Negative for unexpected weight change.   Respiratory: Negative.    Cardiovascular: Negative.    Musculoskeletal: Positive for back pain.       Objective     Physical Exam  HENT:      Right Ear: Tympanic membrane and ear canal normal.      Left Ear: Tympanic membrane and ear canal normal.      Mouth/Throat:      Mouth: Mucous membranes are moist.      Pharynx: No oropharyngeal exudate or posterior oropharyngeal erythema.      Comments: Crowded pharynx, but no obvious pathology.   Eyes:      General: No scleral icterus.     Conjunctiva/sclera: Conjunctivae normal.   Cardiovascular:      Rate and Rhythm: Normal rate and regular rhythm.      Heart sounds: Murmur heard.    Systolic murmur is present with a grade of 2/6.  Pulmonary:      Effort: Pulmonary effort is normal. No respiratory distress.      Breath sounds: Normal breath sounds. No wheezing.       Vitals:    05/26/22 1248   BP: 124/74   BP Location: Right arm   Patient Position: Sitting   BP Method: Medium (Manual)   Pulse: 64   Resp: 18   Weight: 101.9 kg (224 lb 8.6 oz)   Height: 5' 7" (1.702 m)       RECENT LABS:    Lab Results   Component Value Date    WBC 8.66 " 04/09/2022    HGB 11.9 (L) 04/09/2022    HCT 37.7 04/09/2022     04/09/2022    CHOL 164 05/19/2022    TRIG 192 (H) 05/19/2022    HDL 26 (L) 05/19/2022    ALT 45 (H) 05/19/2022    AST 37 05/19/2022     05/19/2022    K 5.1 05/19/2022     05/19/2022    CREATININE 1.0 05/19/2022    BUN 15 05/19/2022    CO2 26 05/19/2022    TSH 0.934 05/19/2022    INR 1.1 04/09/2022    HGBA1C 7.9 (H) 05/19/2022       Results for orders placed or performed in visit on 05/19/22   Hemoglobin A1C   Result Value Ref Range    Hemoglobin A1C 7.9 (H) 4.0 - 5.6 %    Estimated Avg Glucose 180 (H) 68 - 131 mg/dL   Comprehensive Metabolic Panel   Result Value Ref Range    Sodium 141 136 - 145 mmol/L    Potassium 5.1 3.5 - 5.1 mmol/L    Chloride 106 95 - 110 mmol/L    CO2 26 23 - 29 mmol/L    Glucose 200 (H) 70 - 110 mg/dL    BUN 15 8 - 23 mg/dL    Creatinine 1.0 0.5 - 1.4 mg/dL    Calcium 9.1 8.7 - 10.5 mg/dL    Total Protein 7.0 6.0 - 8.4 g/dL    Albumin 3.5 3.5 - 5.2 g/dL    Total Bilirubin 0.4 0.1 - 1.0 mg/dL    Alkaline Phosphatase 116 55 - 135 U/L    AST 37 10 - 40 U/L    ALT 45 (H) 10 - 44 U/L    Anion Gap 9 8 - 16 mmol/L    eGFR if African American >60.0 >60 mL/min/1.73 m^2    eGFR if non  59.7 (A) >60 mL/min/1.73 m^2   Lipid Panel   Result Value Ref Range    Cholesterol 164 120 - 199 mg/dL    Triglycerides 192 (H) 30 - 150 mg/dL    HDL 26 (L) 40 - 75 mg/dL    LDL Cholesterol 99.6 63.0 - 159.0 mg/dL    HDL/Cholesterol Ratio 15.9 (L) 20.0 - 50.0 %    Total Cholesterol/HDL Ratio 6.3 (H) 2.0 - 5.0    Non-HDL Cholesterol 138 mg/dL   TSH   Result Value Ref Range    TSH 0.934 0.400 - 4.000 uIU/mL   PTH, Intact   Result Value Ref Range    PTH, Intact 153.4 (H) 9.0 - 77.0 pg/mL   Magnesium   Result Value Ref Range    Magnesium 1.7 1.6 - 2.6 mg/dL     *Note: Due to a large number of results and/or encounters for the requested time period, some results have not been displayed. A complete set of results can be found in  Results Review.

## 2022-05-26 NOTE — ASSESSMENT & PLAN NOTE
Presume secondary to CKD.  No history of hypercalcemia.  I do recommend following back with her nephrologist for general nephrology care and evaluation.  If they do not feel this finding is related to kidney disease she may wish to consult with Endocrinology.

## 2022-06-05 ENCOUNTER — PATIENT MESSAGE (OUTPATIENT)
Dept: PRIMARY CARE CLINIC | Facility: CLINIC | Age: 62
End: 2022-06-05
Payer: MEDICARE

## 2022-06-09 ENCOUNTER — HOSPITAL ENCOUNTER (OUTPATIENT)
Dept: RADIOLOGY | Facility: HOSPITAL | Age: 62
Discharge: HOME | End: 2022-06-09
Attending: INTERNAL MEDICINE
Payer: MEDICARE

## 2022-06-09 DIAGNOSIS — K76.0 NAFLD (NONALCOHOLIC FATTY LIVER DISEASE): ICD-10-CM

## 2022-06-09 PROCEDURE — 76705 US ABDOMEN LIMITED_LIVER: ICD-10-PCS | Mod: 26,,, | Performed by: RADIOLOGY

## 2022-06-09 PROCEDURE — 76705 ECHO EXAM OF ABDOMEN: CPT | Mod: 26,,, | Performed by: RADIOLOGY

## 2022-06-09 PROCEDURE — 76705 ECHO EXAM OF ABDOMEN: CPT | Mod: TC,PO

## 2022-06-20 ENCOUNTER — OFFICE VISIT (OUTPATIENT)
Dept: PRIMARY CARE CLINIC | Facility: CLINIC | Age: 62
End: 2022-06-20
Payer: MEDICARE

## 2022-06-20 VITALS
HEIGHT: 67 IN | OXYGEN SATURATION: 97 % | DIASTOLIC BLOOD PRESSURE: 70 MMHG | HEART RATE: 77 BPM | BODY MASS INDEX: 34.71 KG/M2 | WEIGHT: 221.13 LBS | RESPIRATION RATE: 18 BRPM | SYSTOLIC BLOOD PRESSURE: 130 MMHG

## 2022-06-20 DIAGNOSIS — N18.30 HYPERTENSION ASSOCIATED WITH STAGE 3 CHRONIC KIDNEY DISEASE DUE TO TYPE 2 DIABETES MELLITUS: Chronic | ICD-10-CM

## 2022-06-20 DIAGNOSIS — Z94.0 STATUS POST LIVING-DONOR KIDNEY TRANSPLANTATION: Primary | Chronic | ICD-10-CM

## 2022-06-20 DIAGNOSIS — R07.89 ATYPICAL CHEST PAIN: ICD-10-CM

## 2022-06-20 DIAGNOSIS — E11.22 HYPERTENSION ASSOCIATED WITH STAGE 3 CHRONIC KIDNEY DISEASE DUE TO TYPE 2 DIABETES MELLITUS: Chronic | ICD-10-CM

## 2022-06-20 DIAGNOSIS — R25.3 MUSCULAR FASCICULATION: ICD-10-CM

## 2022-06-20 DIAGNOSIS — E11.8 CONTROLLED TYPE 2 DIABETES MELLITUS WITH COMPLICATION, WITH LONG-TERM CURRENT USE OF INSULIN: Chronic | ICD-10-CM

## 2022-06-20 DIAGNOSIS — Z79.4 CONTROLLED TYPE 2 DIABETES MELLITUS WITH COMPLICATION, WITH LONG-TERM CURRENT USE OF INSULIN: Chronic | ICD-10-CM

## 2022-06-20 DIAGNOSIS — I12.9 HYPERTENSION ASSOCIATED WITH STAGE 3 CHRONIC KIDNEY DISEASE DUE TO TYPE 2 DIABETES MELLITUS: Chronic | ICD-10-CM

## 2022-06-20 DIAGNOSIS — J45.20 MILD INTERMITTENT ASTHMA WITHOUT COMPLICATION: Chronic | ICD-10-CM

## 2022-06-20 DIAGNOSIS — R07.9 CHEST PAIN, UNSPECIFIED TYPE: ICD-10-CM

## 2022-06-20 PROCEDURE — 3066F NEPHROPATHY DOC TX: CPT | Mod: CPTII,S$GLB,, | Performed by: FAMILY MEDICINE

## 2022-06-20 PROCEDURE — 99999 PR PBB SHADOW E&M-EST. PATIENT-LVL V: ICD-10-PCS | Mod: PBBFAC,,, | Performed by: FAMILY MEDICINE

## 2022-06-20 PROCEDURE — 3008F PR BODY MASS INDEX (BMI) DOCUMENTED: ICD-10-PCS | Mod: CPTII,S$GLB,, | Performed by: FAMILY MEDICINE

## 2022-06-20 PROCEDURE — 3066F PR DOCUMENTATION OF TREATMENT FOR NEPHROPATHY: ICD-10-PCS | Mod: CPTII,S$GLB,, | Performed by: FAMILY MEDICINE

## 2022-06-20 PROCEDURE — 99215 OFFICE O/P EST HI 40 MIN: CPT | Mod: S$GLB,,, | Performed by: FAMILY MEDICINE

## 2022-06-20 PROCEDURE — 1159F PR MEDICATION LIST DOCUMENTED IN MEDICAL RECORD: ICD-10-PCS | Mod: CPTII,S$GLB,, | Performed by: FAMILY MEDICINE

## 2022-06-20 PROCEDURE — 1160F RVW MEDS BY RX/DR IN RCRD: CPT | Mod: CPTII,S$GLB,, | Performed by: FAMILY MEDICINE

## 2022-06-20 PROCEDURE — 1160F PR REVIEW ALL MEDS BY PRESCRIBER/CLIN PHARMACIST DOCUMENTED: ICD-10-PCS | Mod: CPTII,S$GLB,, | Performed by: FAMILY MEDICINE

## 2022-06-20 PROCEDURE — 3078F PR MOST RECENT DIASTOLIC BLOOD PRESSURE < 80 MM HG: ICD-10-PCS | Mod: CPTII,S$GLB,, | Performed by: FAMILY MEDICINE

## 2022-06-20 PROCEDURE — 3078F DIAST BP <80 MM HG: CPT | Mod: CPTII,S$GLB,, | Performed by: FAMILY MEDICINE

## 2022-06-20 PROCEDURE — 4010F ACE/ARB THERAPY RXD/TAKEN: CPT | Mod: CPTII,S$GLB,, | Performed by: FAMILY MEDICINE

## 2022-06-20 PROCEDURE — 3075F SYST BP GE 130 - 139MM HG: CPT | Mod: CPTII,S$GLB,, | Performed by: FAMILY MEDICINE

## 2022-06-20 PROCEDURE — 3008F BODY MASS INDEX DOCD: CPT | Mod: CPTII,S$GLB,, | Performed by: FAMILY MEDICINE

## 2022-06-20 PROCEDURE — 99215 PR OFFICE/OUTPT VISIT, EST, LEVL V, 40-54 MIN: ICD-10-PCS | Mod: S$GLB,,, | Performed by: FAMILY MEDICINE

## 2022-06-20 PROCEDURE — 99999 PR PBB SHADOW E&M-EST. PATIENT-LVL V: CPT | Mod: PBBFAC,,, | Performed by: FAMILY MEDICINE

## 2022-06-20 PROCEDURE — 3051F PR MOST RECENT HEMOGLOBIN A1C LEVEL 7.0 - < 8.0%: ICD-10-PCS | Mod: CPTII,S$GLB,, | Performed by: FAMILY MEDICINE

## 2022-06-20 PROCEDURE — 3075F PR MOST RECENT SYSTOLIC BLOOD PRESS GE 130-139MM HG: ICD-10-PCS | Mod: CPTII,S$GLB,, | Performed by: FAMILY MEDICINE

## 2022-06-20 PROCEDURE — 3051F HG A1C>EQUAL 7.0%<8.0%: CPT | Mod: CPTII,S$GLB,, | Performed by: FAMILY MEDICINE

## 2022-06-20 PROCEDURE — 4010F PR ACE/ARB THEARPY RXD/TAKEN: ICD-10-PCS | Mod: CPTII,S$GLB,, | Performed by: FAMILY MEDICINE

## 2022-06-20 PROCEDURE — 1159F MED LIST DOCD IN RCRD: CPT | Mod: CPTII,S$GLB,, | Performed by: FAMILY MEDICINE

## 2022-06-20 NOTE — PROGRESS NOTES
THIS DOCUMENT WAS MADE IN PART WITH VOICE RECOGNITION SOFTWARE.  OCCASIONALLY THIS SOFTWARE WILL MISINTERPRET WORDS OR PHRASES.      Primary Care Provider Appointment   [facility] ([facility])  [address], [place] [address]   Ph: [phone]  Fax: [phone]      Patient ID: Andra Newell is a 62 y.o. female.    ASSESSMENT/PLAN by Problem List:  Problem List Items Addressed This Visit     Living-donor kidney transplant 1/14/2019 - Primary (Chronic)     she continues to follow with her nephrologist Dalton Weiss,  as well as the transplant team at [facility].  Overall this appears stable.  Continue to follow as previously advised per her transplant team           Controlled type 2 diabetes mellitus with complication, with long-term current use of insulin (Chronic)     Followed by Endocrinology.  Last A1c was 7.9% which represents a mild improvement.  Although she still has had regular elevated readings.  Reviewed medications, continue the same and follow with Endocrinology.  Continue to monitor glucose regularly and follow an appropriate diabetic diet           Relevant Orders    Nuclear Stress - Cardiology Interpreted    Hypertension associated with stage 3 chronic kidney disease due to type 2 diabetes mellitus (Chronic)     Elevated today.  Increased back pain and activity.  Average readings have been better but I did encourage her to monitor more regularly at home and report any persistent elevations.           Mild intermittent asthma without complication (Chronic)     No recent episodes no wheezing.  No regular use of bronchodilators.  Will monitor but at this time there does not appear to be indications for treatment.           Muscular fasciculation     Bilateral fasciculations of both lower legs.  Not painful.  She does have some visible muscle atrophy particularly on the left side but she has had multiple knee surgeries.  She also has  significant lumbar degenerative disc disease which could be contributing.  She does have diminished DTRs diffusely.  But I do not see any other acute process.  Although I can not definitively exclude a neuromuscular disorder so as a precaution will recommend referral to Neurology.           Relevant Orders    Ambulatory referral/consult to Neurology      Other Visit Diagnoses     Chest pain, unspecified type        Relevant Orders    Nuclear Stress - Cardiology Interpreted    Atypical chest pain        Relevant Orders    Nuclear Stress - Cardiology Interpreted      She reports significant indigestion and acid reflux type symptoms that is not relieved by her proton pump inhibitor, H2 blocker, and multiple times per day.  She has had a few episodes of more severe type chest pain.  Occasionally associated with nausea.  No radiation or diaphoresis.  She has multiple risk factors for cardiac disease so I recommend proceeding with stress test.  She is not having any chest pain today but if she has any severe episodes or change in symptoms before the stress test I recommend going to the emergency room for immediate evaluation and stat EKG along with enzymes.  If this is normal then recommended GI.    Follow Up:  As scheduled in August unless needed sooner    52 minutes of total time spent on the encounter, which includes face to face time and non-face to face time preparing to see the patient (eg, review of tests), Obtaining and/or reviewing separately obtained history, Documenting clinical information in the electronic or other health record, Independently interpreting results (not separately reported) and communicating results to the patient/family/caregiver, or Care coordination (not separately reported).    Health Maintenance       Date Due Completion Date    Shingles Vaccine (1 of 2) Never done ---    Eye Exam 11/18/2021 11/18/2020    COVID-19 Vaccine (3 - Booster for Pfizer series) 12/14/2021 7/14/2021    Mammogram  08/05/2022 8/5/2021    Diabetes Urine Screening 11/17/2022 11/17/2021    Hemoglobin A1c 11/19/2022 5/19/2022    Foot Exam 03/31/2023 3/31/2022    Override on 10/2/2019: Done    Lipid Panel 05/19/2023 5/19/2022    Low Dose Statin 06/05/2023 6/5/2022    Cervical Cancer Screening 09/04/2023 9/4/2018    Colorectal Cancer Screening 05/09/2024 5/9/2019    TETANUS VACCINE 07/12/2027 7/12/2017          Advance Care Planning     Living will on file         Subjective:     Chief Complaint   Patient presents with    Hospital Follow Up     Still c/o severe back pain, has a pain pump in and is being adjusted     I have reviewed the information entered by the ancillary staff regarding the chief complaint as well as the related history.    HPI    Patient is a/an 62 y.o.  female   RISK of ADMIT/ED: 38    Emergency room follow-up.  Patient presented with visible muscle fasciculations in the lower extremities.  There were not painful at the time.  Emergency room workup was unremarkable other than hyperglycemia around 300.     She also reports significant indigestion.  Acid reflux, heartburn, not relieved by her pantoprazole and Pepcid.  She is taking about 10 Tums a day.    For complete problem list, past medical history, surgical history, social history, etc., see appropriate section in the electronic medical record    Review of Systems   Respiratory: Negative for wheezing.    Cardiovascular: Positive for chest pain.   Musculoskeletal: Positive for back pain.   Neurological: Negative for weakness.       Objective     Physical Exam  Vitals reviewed.   Constitutional:       General: She is not in acute distress.     Appearance: She is well-developed. She is not diaphoretic.   HENT:      Head: Normocephalic and atraumatic.   Eyes:      General: No scleral icterus.  Cardiovascular:      Rate and Rhythm: Normal rate and regular rhythm.   Pulmonary:      Effort: Pulmonary effort is normal. No respiratory distress.   Neurological:       "Mental Status: She is alert and oriented to person, place, and time.      Comments: Atrophy of the left calf muscle compared to the right.  Multiple surgical scars on the left leg including anterior knee and Achilles region.  Muscle strength appears to be normal in the right side, mildly diminished with extension of the left leg.  DTRs were difficult to elicit on the left side.  There were 1+ right Achilles, patella, and bilateral upper extremities.    There are some visible fasciculations noted in both calf muscles more prominent when she had her legs crossed but still visible in a sitting position.  Cannot see any fasciculations involving other muscles.  No rigidity, no cogwheeling.   Psychiatric:         Behavior: Behavior normal.       Vitals:    06/20/22 1426 06/20/22 1529   BP: (!) 164/80 130/70   BP Location: Right arm    Patient Position: Sitting    BP Method: Pediatric (Manual)    Pulse: 77    Resp: 18    SpO2: 97%    Weight: 100.3 kg (221 lb 1.9 oz)    Height: 5' 7" (1.702 m)        RECENT LABS:    Lab Results   Component Value Date    WBC 7.03 06/09/2022    HGB 11.1 (L) 06/09/2022    HCT 36.8 (L) 06/09/2022     06/09/2022    CHOL 164 05/19/2022    TRIG 192 (H) 05/19/2022    HDL 26 (L) 05/19/2022    ALT 49 (H) 06/09/2022    AST 40 06/09/2022     06/09/2022    K 4.3 06/09/2022     06/09/2022    CREATININE 1.0 06/09/2022    BUN 16 06/09/2022    CO2 27 06/09/2022    TSH 0.934 05/19/2022    INR 1.0 06/09/2022    HGBA1C 7.9 (H) 05/19/2022       Results for orders placed or performed in visit on 06/09/22   Comprehensive Metabolic Panel   Result Value Ref Range    Sodium 143 136 - 145 mmol/L    Potassium 4.3 3.5 - 5.1 mmol/L    Chloride 105 95 - 110 mmol/L    CO2 27 23 - 29 mmol/L    Glucose 128 (H) 70 - 110 mg/dL    BUN 16 8 - 23 mg/dL    Creatinine 1.0 0.5 - 1.4 mg/dL    Calcium 9.1 8.7 - 10.5 mg/dL    Total Protein 7.0 6.0 - 8.4 g/dL    Albumin 3.7 3.5 - 5.2 g/dL    Total Bilirubin 0.4 0.1 - " 1.0 mg/dL    Alkaline Phosphatase 130 55 - 135 U/L    AST 40 10 - 40 U/L    ALT 49 (H) 10 - 44 U/L    Anion Gap 11 8 - 16 mmol/L    eGFR if African American >60.0 >60 mL/min/1.73 m^2    eGFR if non  59.7 (A) >60 mL/min/1.73 m^2   CBC Auto Differential   Result Value Ref Range    WBC 7.03 3.90 - 12.70 K/uL    RBC 3.97 (L) 4.00 - 5.40 M/uL    Hemoglobin 11.1 (L) 12.0 - 16.0 g/dL    Hematocrit 36.8 (L) 37.0 - 48.5 %    MCV 93 82 - 98 fL    MCH 28.0 27.0 - 31.0 pg    MCHC 30.2 (L) 32.0 - 36.0 g/dL    RDW 14.3 11.5 - 14.5 %    Platelets 231 150 - 450 K/uL    MPV 10.3 9.2 - 12.9 fL    Immature Granulocytes 0.4 0.0 - 0.5 %    Gran # (ANC) 4.6 1.8 - 7.7 K/uL    Immature Grans (Abs) 0.03 0.00 - 0.04 K/uL    Lymph # 1.1 1.0 - 4.8 K/uL    Mono # 0.5 0.3 - 1.0 K/uL    Eos # 0.8 (H) 0.0 - 0.5 K/uL    Baso # 0.05 0.00 - 0.20 K/uL    nRBC 0 0 /100 WBC    Gran % 65.2 38.0 - 73.0 %    Lymph % 16.1 (L) 18.0 - 48.0 %    Mono % 6.5 4.0 - 15.0 %    Eosinophil % 11.1 (H) 0.0 - 8.0 %    Basophil % 0.7 0.0 - 1.9 %    Differential Method Automated    Protime-INR   Result Value Ref Range    Prothrombin Time 10.9 9.0 - 12.5 sec    INR 1.0 0.8 - 1.2   AFP Tumor Marker   Result Value Ref Range    AFP <2.0 0.0 - 8.4 ng/mL     *Note: Due to a large number of results and/or encounters for the requested time period, some results have not been displayed. A complete set of results can be found in Results Review.

## 2022-06-20 NOTE — ASSESSMENT & PLAN NOTE
Bilateral fasciculations of both lower legs.  Not painful.  She does have some visible muscle atrophy particularly on the left side but she has had multiple knee surgeries.  She also has significant lumbar degenerative disc disease which could be contributing.  She does have diminished DTRs diffusely.  But I do not see any other acute process.  Although I can not definitively exclude a neuromuscular disorder so as a precaution will recommend referral to Neurology.

## 2022-06-20 NOTE — ASSESSMENT & PLAN NOTE
she continues to follow with her nephrologist Dalton Weiss,  as well as the transplant team at [facility].  Overall this appears stable.  Continue to follow as previously advised per her transplant team

## 2022-06-20 NOTE — ASSESSMENT & PLAN NOTE
No recent episodes no wheezing.  No regular use of bronchodilators.  Will monitor but at this time there does not appear to be indications for treatment.

## 2022-06-20 NOTE — ASSESSMENT & PLAN NOTE
Elevated today.  Increased back pain and activity.  Average readings have been better but I did encourage her to monitor more regularly at home and report any persistent elevations.

## 2022-06-20 NOTE — ASSESSMENT & PLAN NOTE
Followed by Endocrinology.  Last A1c was 7.9% which represents a mild improvement.  Although she still has had regular elevated readings.  Reviewed medications, continue the same and follow with Endocrinology.  Continue to monitor glucose regularly and follow an appropriate diabetic diet

## 2022-06-23 DIAGNOSIS — E03.9 ACQUIRED HYPOTHYROIDISM: ICD-10-CM

## 2022-06-23 RX ORDER — LEVOTHYROXINE SODIUM 75 UG/1
TABLET ORAL
Qty: 90 TABLET | Refills: 3 | Status: SHIPPED | OUTPATIENT
Start: 2022-06-23 | End: 2023-07-15

## 2022-06-23 NOTE — TELEPHONE ENCOUNTER
No new care gaps identified.  [facility] Embedded Care Gaps. Reference number: [id number]. 6/23/2022   6:05:14 PM CDT

## 2022-06-28 ENCOUNTER — TELEPHONE (OUTPATIENT)
Dept: NEUROLOGY | Facility: CLINIC | Age: 62
End: 2022-06-28
Payer: MEDICARE

## 2022-06-28 NOTE — TELEPHONE ENCOUNTER
Pt referred for muscular fasciculation by Dr. Vaughn. Left voicemail to get more information on symptoms pt is having and to reschedule with correct provider.

## 2022-06-30 ENCOUNTER — PATIENT MESSAGE (OUTPATIENT)
Dept: NEUROLOGY | Facility: CLINIC | Age: 62
End: 2022-06-30
Payer: MEDICARE

## 2022-06-30 ENCOUNTER — TELEPHONE (OUTPATIENT)
Dept: NEUROLOGY | Facility: CLINIC | Age: 62
End: 2022-06-30
Payer: MEDICARE

## 2022-06-30 ENCOUNTER — PATIENT MESSAGE (OUTPATIENT)
Dept: CARDIOLOGY | Facility: HOSPITAL | Age: 62
End: 2022-06-30
Payer: MEDICARE

## 2022-06-30 NOTE — TELEPHONE ENCOUNTER
Left voice mail informing patient to not to gives us a call back regarding her appointment with Dr. Church today.  It would be better for her to see a different provider that would specialize in the neurological symptome's she is having.

## 2022-06-30 NOTE — TELEPHONE ENCOUNTER
Spoke with patient appointment with doctor Church will be canceled today and reschedule with doctor nelson.  Patient understood and agreed.

## 2022-07-01 ENCOUNTER — HOSPITAL ENCOUNTER (OUTPATIENT)
Dept: RADIOLOGY | Facility: HOSPITAL | Age: 62
Discharge: HOME | End: 2022-07-01
Attending: FAMILY MEDICINE
Payer: MEDICARE

## 2022-07-01 ENCOUNTER — CLINICAL SUPPORT (OUTPATIENT)
Dept: CARDIOLOGY | Facility: HOSPITAL | Age: 62
End: 2022-07-01
Attending: FAMILY MEDICINE
Payer: MEDICARE

## 2022-07-01 VITALS — WEIGHT: 221 LBS | HEIGHT: 67 IN | BODY MASS INDEX: 34.69 KG/M2

## 2022-07-01 DIAGNOSIS — E11.8 CONTROLLED TYPE 2 DIABETES MELLITUS WITH COMPLICATION, WITH LONG-TERM CURRENT USE OF INSULIN: Chronic | ICD-10-CM

## 2022-07-01 DIAGNOSIS — Z79.4 CONTROLLED TYPE 2 DIABETES MELLITUS WITH COMPLICATION, WITH LONG-TERM CURRENT USE OF INSULIN: Chronic | ICD-10-CM

## 2022-07-01 DIAGNOSIS — R07.89 ATYPICAL CHEST PAIN: ICD-10-CM

## 2022-07-01 DIAGNOSIS — R07.9 CHEST PAIN, UNSPECIFIED TYPE: ICD-10-CM

## 2022-07-01 LAB
CV PHARM DOSE: 0.4 MG
CV STRESS BASE HR: 72 BPM
DIASTOLIC BLOOD PRESSURE: 76 MMHG
NUC REST EJECTION FRACTION: 60
NUC STRESS EJECTION FRACTION: 76 %
OHS CV CPX 1 MINUTE RECOVERY HEART RATE: 103 BPM
OHS CV CPX 85 PERCENT MAX PREDICTED HEART RATE MALE: 127
OHS CV CPX MAX PREDICTED HEART RATE: 150
OHS CV CPX PATIENT IS FEMALE: 1
OHS CV CPX PATIENT IS MALE: 0
OHS CV CPX PEAK DIASTOLIC BLOOD PRESSURE: 73 MMHG
OHS CV CPX PEAK HEAR RATE: 106 BPM
OHS CV CPX PEAK RATE PRESSURE PRODUCT: NORMAL
OHS CV CPX PEAK SYSTOLIC BLOOD PRESSURE: 192 MMHG
OHS CV CPX PERCENT MAX PREDICTED HEART RATE ACHIEVED: 71
OHS CV CPX RATE PRESSURE PRODUCT PRESENTING: NORMAL
OHS CV PHARM TIME: 1410 MIN
SYSTOLIC BLOOD PRESSURE: 170 MMHG

## 2022-07-01 PROCEDURE — 78452 STRESS TEST WITH MYOCARDIAL PERFUSION (CUPID ONLY): ICD-10-PCS | Mod: 26,,, | Performed by: INTERNAL MEDICINE

## 2022-07-01 PROCEDURE — 93016 CV STRESS TEST SUPVJ ONLY: CPT | Mod: ,,, | Performed by: INTERNAL MEDICINE

## 2022-07-01 PROCEDURE — 78452 HT MUSCLE IMAGE SPECT MULT: CPT | Mod: 26,,, | Performed by: INTERNAL MEDICINE

## 2022-07-01 PROCEDURE — A9502 TC99M TETROFOSMIN: HCPCS | Mod: PO

## 2022-07-01 PROCEDURE — 93018 CV STRESS TEST I&R ONLY: CPT | Mod: ,,, | Performed by: INTERNAL MEDICINE

## 2022-07-01 PROCEDURE — 63600175 PHARM REV CODE 636 W HCPCS: Mod: PO | Performed by: FAMILY MEDICINE

## 2022-07-01 PROCEDURE — 93017 CV STRESS TEST TRACING ONLY: CPT | Mod: PO

## 2022-07-01 PROCEDURE — 93018 PR CARDIAC STRESS TST,INTERP/REPT ONLY: ICD-10-PCS | Mod: ,,, | Performed by: INTERNAL MEDICINE

## 2022-07-01 PROCEDURE — 93016 STRESS TEST WITH MYOCARDIAL PERFUSION (CUPID ONLY): ICD-10-PCS | Mod: ,,, | Performed by: INTERNAL MEDICINE

## 2022-07-01 RX ORDER — REGADENOSON 0.08 MG/ML
0.4 INJECTION, SOLUTION INTRAVENOUS ONCE
Status: COMPLETED | OUTPATIENT
Start: 2022-07-01 | End: 2022-07-01

## 2022-07-01 RX ADMIN — REGADENOSON 0.4 MG: 0.08 INJECTION, SOLUTION INTRAVENOUS at 02:07

## 2022-07-08 ENCOUNTER — TELEPHONE (OUTPATIENT)
Dept: PRIMARY CARE CLINIC | Facility: CLINIC | Age: 62
End: 2022-07-08

## 2022-07-08 NOTE — TELEPHONE ENCOUNTER
----- Message from Therese Nguyen sent at 7/8/2022  9:20 AM CDT -----  Contact: Marcy wakefield/ [facility]  Type: Needs Medical Advice    Who Called:  Marcy Stevenson Call Back Number: [phone]    Additional Information: States that a stress test was ordered on 6/28 & she needs the CPT code by 12:00 noon today.    Please call back.  Thanks.

## 2022-07-08 NOTE — TELEPHONE ENCOUNTER
Spoke to Marcy with [facility]. Stated to her that we did not do CPT codes, that she would need to contact our Billing Department. Phone number given. Verbalized understanding.

## 2022-07-20 DIAGNOSIS — I12.9 HYPERTENSION ASSOCIATED WITH STAGE 3 CHRONIC KIDNEY DISEASE DUE TO TYPE 2 DIABETES MELLITUS: ICD-10-CM

## 2022-07-20 DIAGNOSIS — N18.30 HYPERTENSION ASSOCIATED WITH STAGE 3 CHRONIC KIDNEY DISEASE DUE TO TYPE 2 DIABETES MELLITUS: ICD-10-CM

## 2022-07-20 DIAGNOSIS — E11.22 HYPERTENSION ASSOCIATED WITH STAGE 3 CHRONIC KIDNEY DISEASE DUE TO TYPE 2 DIABETES MELLITUS: ICD-10-CM

## 2022-07-20 RX ORDER — LOSARTAN POTASSIUM 50 MG/1
50 TABLET ORAL DAILY
Qty: 90 TABLET | Refills: 3 | Status: SHIPPED | OUTPATIENT
Start: 2022-07-20 | End: 2023-07-15

## 2022-07-27 ENCOUNTER — PATIENT MESSAGE (OUTPATIENT)
Dept: TRANSPLANT | Facility: CLINIC | Age: 62
End: 2022-07-27
Payer: MEDICARE

## 2022-08-02 RX ORDER — INSULIN DEGLUDEC 200 U/ML
80 INJECTION, SOLUTION SUBCUTANEOUS DAILY
Qty: 12 PEN | Refills: 3 | Status: SHIPPED | OUTPATIENT
Start: 2022-08-02 | End: 2022-11-22 | Stop reason: SDUPTHER

## 2022-08-04 ENCOUNTER — OFFICE VISIT (OUTPATIENT)
Dept: NEUROLOGY | Facility: CLINIC | Age: 62
End: 2022-08-04
Payer: MEDICARE

## 2022-08-04 ENCOUNTER — TELEPHONE (OUTPATIENT)
Dept: NEUROLOGY | Facility: CLINIC | Age: 62
End: 2022-08-04
Payer: MEDICARE

## 2022-08-04 VITALS
HEART RATE: 78 BPM | RESPIRATION RATE: 17 BRPM | BODY MASS INDEX: 34.15 KG/M2 | WEIGHT: 218.06 LBS | DIASTOLIC BLOOD PRESSURE: 75 MMHG | SYSTOLIC BLOOD PRESSURE: 195 MMHG

## 2022-08-04 DIAGNOSIS — M54.16 LUMBAR RADICULOPATHY, CHRONIC: ICD-10-CM

## 2022-08-04 DIAGNOSIS — R25.3 MUSCULAR FASCICULATION: ICD-10-CM

## 2022-08-04 PROCEDURE — 1160F PR REVIEW ALL MEDS BY PRESCRIBER/CLIN PHARMACIST DOCUMENTED: ICD-10-PCS | Mod: CPTII,S$GLB,, | Performed by: PSYCHIATRY & NEUROLOGY

## 2022-08-04 PROCEDURE — 4010F ACE/ARB THERAPY RXD/TAKEN: CPT | Mod: CPTII,S$GLB,, | Performed by: PSYCHIATRY & NEUROLOGY

## 2022-08-04 PROCEDURE — 3066F NEPHROPATHY DOC TX: CPT | Mod: CPTII,S$GLB,, | Performed by: PSYCHIATRY & NEUROLOGY

## 2022-08-04 PROCEDURE — 3008F PR BODY MASS INDEX (BMI) DOCUMENTED: ICD-10-PCS | Mod: CPTII,S$GLB,, | Performed by: PSYCHIATRY & NEUROLOGY

## 2022-08-04 PROCEDURE — 3078F DIAST BP <80 MM HG: CPT | Mod: CPTII,S$GLB,, | Performed by: PSYCHIATRY & NEUROLOGY

## 2022-08-04 PROCEDURE — 99205 OFFICE O/P NEW HI 60 MIN: CPT | Mod: S$GLB,,, | Performed by: PSYCHIATRY & NEUROLOGY

## 2022-08-04 PROCEDURE — 1159F MED LIST DOCD IN RCRD: CPT | Mod: CPTII,S$GLB,, | Performed by: PSYCHIATRY & NEUROLOGY

## 2022-08-04 PROCEDURE — 1159F PR MEDICATION LIST DOCUMENTED IN MEDICAL RECORD: ICD-10-PCS | Mod: CPTII,S$GLB,, | Performed by: PSYCHIATRY & NEUROLOGY

## 2022-08-04 PROCEDURE — 99999 PR PBB SHADOW E&M-EST. PATIENT-LVL V: ICD-10-PCS | Mod: PBBFAC,,, | Performed by: PSYCHIATRY & NEUROLOGY

## 2022-08-04 PROCEDURE — 99205 PR OFFICE/OUTPT VISIT, NEW, LEVL V, 60-74 MIN: ICD-10-PCS | Mod: S$GLB,,, | Performed by: PSYCHIATRY & NEUROLOGY

## 2022-08-04 PROCEDURE — 3077F PR MOST RECENT SYSTOLIC BLOOD PRESSURE >= 140 MM HG: ICD-10-PCS | Mod: CPTII,S$GLB,, | Performed by: PSYCHIATRY & NEUROLOGY

## 2022-08-04 PROCEDURE — 3066F PR DOCUMENTATION OF TREATMENT FOR NEPHROPATHY: ICD-10-PCS | Mod: CPTII,S$GLB,, | Performed by: PSYCHIATRY & NEUROLOGY

## 2022-08-04 PROCEDURE — 4010F PR ACE/ARB THEARPY RXD/TAKEN: ICD-10-PCS | Mod: CPTII,S$GLB,, | Performed by: PSYCHIATRY & NEUROLOGY

## 2022-08-04 PROCEDURE — 99999 PR PBB SHADOW E&M-EST. PATIENT-LVL V: CPT | Mod: PBBFAC,,, | Performed by: PSYCHIATRY & NEUROLOGY

## 2022-08-04 PROCEDURE — 3052F PR MOST RECENT HEMOGLOBIN A1C LEVEL 8.0 - < 9.0%: ICD-10-PCS | Mod: CPTII,S$GLB,, | Performed by: PSYCHIATRY & NEUROLOGY

## 2022-08-04 PROCEDURE — 3078F PR MOST RECENT DIASTOLIC BLOOD PRESSURE < 80 MM HG: ICD-10-PCS | Mod: CPTII,S$GLB,, | Performed by: PSYCHIATRY & NEUROLOGY

## 2022-08-04 PROCEDURE — 3052F HG A1C>EQUAL 8.0%<EQUAL 9.0%: CPT | Mod: CPTII,S$GLB,, | Performed by: PSYCHIATRY & NEUROLOGY

## 2022-08-04 PROCEDURE — 1160F RVW MEDS BY RX/DR IN RCRD: CPT | Mod: CPTII,S$GLB,, | Performed by: PSYCHIATRY & NEUROLOGY

## 2022-08-04 PROCEDURE — 3077F SYST BP >= 140 MM HG: CPT | Mod: CPTII,S$GLB,, | Performed by: PSYCHIATRY & NEUROLOGY

## 2022-08-04 PROCEDURE — 3008F BODY MASS INDEX DOCD: CPT | Mod: CPTII,S$GLB,, | Performed by: PSYCHIATRY & NEUROLOGY

## 2022-08-04 RX ORDER — AMOXICILLIN 500 MG/1
CAPSULE ORAL
COMMUNITY
Start: 2022-07-07 | End: 2022-08-30 | Stop reason: CLARIF

## 2022-08-04 NOTE — PROGRESS NOTES
NEUROLOGY  Outpatient CONSULT  [facility]  [address] [facility], [place] [address]  [phone] (office) / [phone] (fax)    Patient Name:  Andra Newell  :  1960  MR #:  [id number]  Acct #:  [id number]    Date of Neurology Consult: 2022  Name of Neurologist: Lesly Neri D.O, ABPN, AOBNP, ABEM    Other Physicians:  Fady Vaughn MD (Primary Care Physician); Fady Vaughn., * (Referring)      Chief Complaint: muscule fasciculation (Calf muscles )      History of Present Illness (HPI):  Andra Newell is a 62 y.o. female here for evaluation of muscle fasciculations.    Patient states that she has movement in the muscles of the left calf more than the right.  This hs been present for a few months.  She has had multiple surgeries on the left leg including knee and ankle surgery (achilles no longer attached).  She is having trouble walking on the left leg due to her ankle and knee.  There is no pain in the calf.  There is numbness in the left lower leg.  She has back pain.  She has a pain pump that dispenses fentanyl.  This was placed 3-4 months ago (Dr. Nunez).  She does not know if she can have an MRI with this.    She has a history of lumbar spine surgery prior to her last 2019 MRI lumbar spine. She thinks she has a more recent MRI lumbar spine [facility] and will look for this.    She has not had an EMG before for this.    Treatment to date:    She has not done anything different since the leg symptoms began a few months ago.    Review of Systems:   General: Weight gain: No, Weight Loss: No, Fatigue: Yes,   Fever: No, Chills: No, Night Sweats: No, Insomnia: No, Excessive sleeping: No   Respiratory:  Cough: No, Shortness of Breath: No,   Wheezing: No, Excessive Snoring: No, Coughing up blood: No  Endocrine: Heat Intolerance: No, Cold Intolerance: No,   Excessive Thirst: No, Excessive Hunger: No,   Eyes:  Blurred Vision: No, Double Vision: No,    Light Sensitivity: No, Eye pain: No  Musculoskeletal: Muscle Aches/Pain: Yes, Joint Pain/Swelling: Yes, Muscle Cramps: Yes, Muscle Weakness: Yes, Neck Pain: No, Back Pain: Yes   Neurological: Difficulty Walking/Falls: Yes, Headache Migraine: No, Dizziness/Vertigo: Yes, Fainting: No, Difficulty with Speech: No, Weakness: Yes, Tingling/Numbness: No, Tremors: No, Memory Problems: No, Seizures: No, Difficulty Swallowing: Yes, Altered Taste: No.  Cardiovascular: Chest Pain: No, Shortness of Breath: Yes,   Palpitations: No,  Gastrointestinal: Nausea/Vomiting: No, Constipation: No, Diarrhea: No, Bloody Stools: No   Psych/Cog:  Depression: Yes, Anxiety: Yes, Hallucinations: No, Problems Concentrating: No  : Frequent Urination: No, Incontinence: No, Blood of Urine: No, Urinary Infections: No, Changes in Sex Drive: No   ENT:Hearing Loss: Yes, Earache: No, Ringing in Ears: No,   Facial Pain: No, Chronic Congestion: No   Immune: Seasonal Allergies: No, Hives and/or Rashes: No  The remainder of the review of twelve body systems was reviewed and normal.    Past Medical, Surgical, Family & Social History:   Past Medical History:   Diagnosis Date    Anemia     Anemia in chronic kidney disease, on chronic dialysis 7/12/2017    Anxiety and depression     Aplastic anemia with parvovirus B19 infection 5/27/2019    Arthritis     Asthma     allergic airway    CKD stage III (moderate) 2/18/2019    Colon polyp     Depression     Diabetes mellitus     Diverticulosis     Encounter for blood transfusion     Eosinophilia     ESRD (end stage renal disease)     stage V, due for living donor 9/2018    ESRD on dialysis     GERD (gastroesophageal reflux disease)     Gout     Gout     Hyperlipidemia     Hypertension     Hypertriglyceridemia without hypercholesterolemia 6/9/2019    Low back pain     Low HDL (under 40) 6/9/2019    MRSA carrier     Neutropenia, unspecified 5/8/2019    Obesity     Renal disorder     Rotator cuff syndrome--left      "Thyroid disease     Ulnar neuropathy of right upper extremity     Uncontrolled type 2 diabetes mellitus with hyperglycemia      Past Surgical History:   Procedure Laterality Date    ACHILLES TENDON SURGERY Left May 2015    BACK SURGERY      CERVICAL SPINE SURGERY  2021    cervical gate placed by asim Patel, and "gate"    CHOLECYSTECTOMY      COLONOSCOPY N/A 9/6/2017    Procedure: COLONOSCOPY;  Surgeon: Marisol Bryson MD;  Location: [facility] ENDO;  Service: Endoscopy;  Laterality: N/A; REPEAT IN 3 YEARS FOR SURVEILLANCE    COLONOSCOPY N/A 5/9/2019    Procedure: COLONOSCOPY;  Surgeon: Fady Ewing MD;  Location: [facility] ENDO (2ND FLR);  Service: Endoscopy;  Laterality: N/A;    DIALYSIS FISTULA CREATION  12/2017    ESOPHAGOGASTRODUODENOSCOPY N/A 5/9/2019    Procedure: EGD (ESOPHAGOGASTRODUODENOSCOPY);  Surgeon: Fady Ewing MD;  Location: [facility] ENDO (2ND FLR);  Service: Endoscopy;  Laterality: N/A;    ESOPHAGOGASTRODUODENOSCOPY N/A 6/10/2021    Procedure: EGD (ESOPHAGOGASTRODUODENOSCOPY);  Surgeon: Marisol Bryson MD;  Location: [facility];  Service: Endoscopy;  Laterality: N/A;    HEMORRHOID SURGERY      INJECTION OF ANESTHETIC AGENT AROUND MEDIAL BRANCH NERVES INNERVATING LUMBAR FACET JOINT Right 9/25/2019    Procedure: Block-nerve-medial branch-lumbar;  Surgeon: Yonny Ferrer MD;  Location: [facility] OR;  Service: Pain Management;  Laterality: Right;  L2, 3, 4,5     INJECTION OF ANESTHETIC AGENT AROUND MEDIAL BRANCH NERVES INNERVATING LUMBAR FACET JOINT Right 10/16/2019    Procedure: Block-nerve-medial branch-lumbar;  Surgeon: Yonny Ferrer MD;  Location: [facility] OR;  Service: Pain Management;  Laterality: Right;  L2, 3, 4,5     JOINT REPLACEMENT      left    KIDNEY TRANSPLANT N/A 1/14/2019    Procedure: TRANSPLANT, KIDNEY;  Surgeon: Roland Salazar MD;  Location: [facility] OR 2ND FLR;  Service: Transplant;  Laterality: N/A;    KNEE SURGERY      RADIOFREQUENCY ABLATION OF LUMBAR MEDIAL BRANCH NERVE AT SINGLE LEVEL Right " 10/29/2019    Procedure: Radiofrequency Ablation, Nerve, Spinal, Lumbar, Medial Branch, 1 Level;  Surgeon: Yonny Ferrer MD;  Location: [facility] OR;  Service: Pain Management;  Laterality: Right;  L2, 3, 4, 5  burned at 80 degrees C X 60 seconds each site X 2    SHOULDER ARTHROSCOPY      SHOULDER SURGERY      UPPER GASTROINTESTINAL ENDOSCOPY  ~2013    Dr. Moralez     Family History   Problem Relation Age of Onset    Diabetes Mother     Alzheimer's disease Mother     Thyroid disease Mother     Hyperlipidemia Mother     Heart disease Father     Stroke Father     Diabetes Sister     Lupus Sister     Ovarian cancer Sister     Heart disease Brother     No Known Problems Son     Diabetes Maternal Grandmother     Hypertension Maternal Grandmother     No Known Problems Paternal Grandmother     No Known Problems Paternal Grandfather     COPD Maternal Aunt     Diabetes Maternal Aunt     Heart disease Maternal Aunt     Hypertension Maternal Aunt     No Known Problems Maternal Uncle     No Known Problems Paternal Aunt     No Known Problems Paternal Uncle     Colon cancer Neg Hx     Colon polyps Neg Hx     Crohn's disease Neg Hx     Ulcerative colitis Neg Hx     Celiac disease Neg Hx      Alcohol use:  reports no history of alcohol use.   (Of note, 0.6 oz = 1 beer or 6 oz = 10 beers).  Tobacco use:  reports that she has never smoked. She has never used smokeless tobacco.  Street drug use:  reports no history of drug use.  Allergies: Dilaudid [hydromorphone], Morphine, Torsemide, and Codeine.    Home Medications:     Current Outpatient Medications:     amoxicillin (AMOXIL) 500 MG capsule, Take by mouth., Disp: , Rfl:     famotidine (PEPCID) 40 MG tablet, Take 1 tablet (40 mg total) by mouth every evening., Disp: 30 tablet, Rfl: 6    flash glucose sensor (FREESTYLE DAKOTAH 14 DAY SENSOR) Kit, Use 1 sensor every 14 days to check blood glucose, Disp: 6 kit, Rfl: 3    insulin aspart U-100 (NOVOLOG FLEXPEN U-100 INSULIN) 100 unit/mL (3 mL)  InPn pen, Pt is taking 32 units with meals plus ss for a max dose of 150 units a day. (Patient taking differently: Pt is taking 30 units with meals plus ss for a max dose of 150 units a day.), Disp: 18 mL, Rfl: 11    insulin degludec (TRESIBA FLEXTOUCH U-200) 200 unit/mL (3 mL) insulin pen, Inject 80 Units into the skin once daily., Disp: 12 pen, Rfl: 3    levothyroxine (SYNTHROID) 75 MCG tablet, TAKE ONE TABLET BY MOUTH ONCE DAILY, Disp: 90 tablet, Rfl: 3    LORazepam (ATIVAN) 1 MG tablet, Take 1 mg by mouth every evening., Disp: , Rfl:     losartan (COZAAR) 50 MG tablet, Take 1 tablet (50 mg total) by mouth once daily., Disp: 90 tablet, Rfl: 3    magnesium oxide 500 mg Tab, Take 500 mg by mouth once daily., Disp: , Rfl: 0    metFORMIN (GLUCOPHAGE-XR) 500 MG ER 24hr tablet, TAKE ONE TABLET BY MOUTH TWICE DAILY WITH MEALS., Disp: 180 tablet, Rfl: 3    multivitamin (THERAGRAN) tablet, Take 1 tablet by mouth once daily., Disp: , Rfl:     pantoprazole (PROTONIX) 40 MG tablet, TAKE ONE TABLET BY MOUTH TWICE DAILY BEFORE MEALS, Disp: 60 tablet, Rfl: 6    rosuvastatin (CRESTOR) 10 MG tablet, Take 1 tablet (10 mg total) by mouth every evening., Disp: 90 tablet, Rfl: 3    tacrolimus (PROGRAF) 1 MG Cap, Take 1 capsule (1 mg total) by mouth every 12 (twelve) hours. Z94.0, Disp: 60 capsule, Rfl: 11    tiZANidine (ZANAFLEX) 4 MG tablet, Take 1 tablet by mouth every evening., Disp: , Rfl:     vilazodone (VIIBRYD) 40 mg Tab tablet, Take 1 tablet (40 mg total) by mouth once daily., Disp: 30 tablet, Rfl: 4    HYDROcodone-acetaminophen (NORCO)  mg per tablet, Take 1 tablet by mouth 3 (three) times daily as needed., Disp: , Rfl:     Physical Examination:  BP (!) 195/75 (BP Location: Right arm, Patient Position: Sitting, BP Method: Large (Automatic))   Pulse 78   Resp 17   Wt 98.9 kg (218 lb 0.6 oz)   LMP 02/28/2012   BMI 34.15 kg/m²     GENERAL:  General appearance: Well, non-toxic appearing.  No apparent distress.  Fundi  exam: normal.  Neck: supple.  Carotid auscultation: normal.  Heart auscultation: normal.  Peripheral pulses: normal.  Extremities: normal.    MENTAL STATUS:  Alertness, attention span & concentration: normal.  Language: normal.  Orientation to self, place & time:  normal.  Memory, recent & remote: normal.  Fund of knowledge: normal.    SPEECH:  Clear and fluent.  Follows complex commands.    CRANIAL NERVES:  Cranial Nerves II-XII were examined.  II - Visual fields: normal.  III, IV, VI: PERRL, EOMI, No ptosis, No nystagmus.  V - Facial sensation: normal.  VII - Face symmetry & mobility: normal.  VIII - Hearing: normal.  IX, X - Palate: mobile & midline.  XI - Shoulder shrug: normal.  XII - Tongue protrusion: normal.    GROSS MOTOR:  Gait & station: antalgic gait, favors left foot.  Tone: normal.  Abnormal movements: fasciculations in calves of both legs, left >> right.  Finger-nose & Heel-knee-shin: normal.  Rapid alternating movements & drift: normal.      MUSCLE STRENGTH:     Fascics Atrophy RIGHT    LEFT Atrophy Fascics     5 Neck Ext. 5       5 Neck Flex 5       5- Deltoids 5-       5 Sh.Ext.Rot. 5       5 Sh.Int.Rot. 5       5 Biceps 5       5 Triceps 5       5 Forearm.Pr. 5       5 Wrist Ext. 5       5 Wrist Flex 5       5 Finger Ext. 5       5 Finger Flex 5       5 FPL 5       5 Inteross. 5                         5 Iliopsoas 5       5 Hip Abduct 5       5 Hip Adduct 5       5 Quads 5 sl      5 Hams 5       5 Dorsiflex 5 +    +  5 Plantar Flex 5 + +     5 Ankle Jordi 5-       5 Ankle Invert 5-                                           REFLEXES:    RIGHT Reflex   LEFT   1 Biceps 1   1 Brachiorad. 1   1 Triceps 1        1 Patellar 1   0 Ankle No achilles         PLANTAR      SENSORY:  Sharp touch: loss of sharp touch left calf and lateral leg, some loss on right lateral leg.  Loss of left great toe and under left foot.  Slightly decreased right great toe.  Vibration: Normal throughout.      Diagnostic Data  Reviewed:   Will request records    Assessment and Plan:  Andra Newell is a 62 y.o. woman with fasciculations, atrophy and numbness with reduced reflexes of the left distal lower extremity.  These findings are most suggestive of a radiculopathy or focal neuropathy.  Understandably, there is likely concern for motor neuron disease, however this has an associated sensory disturbance which is not consistent with MND.    Unfortunately, I do not have records available today so these will be requested.    From [facility]:  Will get an MRI lumbar spine and EMG to further evaluate your leg symptoms.  The MRI can be cancelled depending on what you have had done more recently at [facility].        Important to note, also  has a past medical history of Anemia, Anemia in chronic kidney disease, on chronic dialysis (7/12/2017), Anxiety and depression, Aplastic anemia with parvovirus B19 infection (5/27/2019), Arthritis, Asthma, CKD stage III (moderate) (2/18/2019), Colon polyp, Depression, Diabetes mellitus, Diverticulosis, Encounter for blood transfusion, Eosinophilia, ESRD (end stage renal disease), ESRD on dialysis, GERD (gastroesophageal reflux disease), Gout, Gout, Hyperlipidemia, Hypertension, Hypertriglyceridemia without hypercholesterolemia (6/9/2019), Low back pain, Low HDL (under 40) (6/9/2019), MRSA carrier, Neutropenia, unspecified (5/8/2019), Obesity, Renal disorder, Rotator cuff syndrome--left, Thyroid disease, Ulnar neuropathy of right upper extremity, and Uncontrolled type 2 diabetes mellitus with hyperglycemia.             Lesly Neri D.O, ABPN, AOBNP, ABEM    This note was created with voice recognition software.  Grammatical, syntax and spelling errors may be inevitable.

## 2022-08-04 NOTE — PATIENT INSTRUCTIONS
Will get an MRI lumbar spine and EMG to further evaluate your leg symptoms.  The MRI can be cancelled depending on what you have had done more recently at [facility].

## 2022-08-11 ENCOUNTER — TELEPHONE (OUTPATIENT)
Dept: NEUROLOGY | Facility: CLINIC | Age: 62
End: 2022-08-11
Payer: MEDICARE

## 2022-08-11 NOTE — TELEPHONE ENCOUNTER
Records and disc received from [facility]. Copy sent to scanning, copy in alphabetical file. Imaging sent for upload, disc to be mailed back to the pt.;

## 2022-08-16 ENCOUNTER — TELEPHONE (OUTPATIENT)
Dept: PRIMARY CARE CLINIC | Facility: CLINIC | Age: 62
End: 2022-08-16
Payer: MEDICARE

## 2022-08-16 DIAGNOSIS — E11.8 CONTROLLED TYPE 2 DIABETES MELLITUS WITH COMPLICATION, WITH LONG-TERM CURRENT USE OF INSULIN: Primary | ICD-10-CM

## 2022-08-16 DIAGNOSIS — Z79.4 CONTROLLED TYPE 2 DIABETES MELLITUS WITH COMPLICATION, WITH LONG-TERM CURRENT USE OF INSULIN: Primary | ICD-10-CM

## 2022-08-16 NOTE — TELEPHONE ENCOUNTER
Patient is asking if she needs any labs done before her appt on 09/01/22. Please advise. She said to please contact her through [employer] to let her know.

## 2022-08-25 NOTE — PROGRESS NOTES
Admit Note     Met with patient alone to assess needs. Pt'a wife is currently at work. Pt reports she was here all night with her and had to go to work. Patient is a 59 y.o. is in committed relationship with Any callejas, admitted for post kidney transplantation on 1/14/19 .    Patient admitted from home on 4/2/2019 .  At this time, patient presents as alert and oriented x 4, pleasant, good eye contact, well groomed, recall good, concentration/judgement good, average intelligence, calm, communicative, cooperative and asking and answering questions appropriately.  At this time, patients caregiver is currently at work and will return tonight.     Household/Family Systems     Patient resides with patient's significant other, Any at [address] [place] [address].  Support system includes s/o, two sons and friend Ashleigh.  Patient does not have dependents that are need of being cared for.     Patients primary caregiver is Any , patients significant other, phone number [phone].  Confirmed patients contact information is [phone] (home);   Telephone Information:   Mobile [phone]   .    During admission, patient's caregiver plans to stay in patient's room.  Confirmed patient and patients caregivers do have access to reliable transportation.    Cognitive Status/Learning     Patient reports reading ability as 12th grade and states patient does have difficulty with seeing and utilizes glasses .  Patient reports patient learns best by hands-on instruction .   Needed: No.   Highest education level: High School (9-12) or GED    Vocation/Disability   .  Working for Income: No  If no, reason not working: Disability  Patient is disabled due to work injury  since 2015.  Prior to disability, patient  was employed as a Rec Supervisor at [employer] .    Adherence     Patient reports a high level of adherence to patients health care regimen.  Adherence counseling and education provided.  Patient verbalizes understanding.    Substance Use    Patient reports the following substance usage.    Tobacco: none, patient denies any use.  Alcohol: none, patient denies any use.  Illicit Drugs/Non-prescribed Medications: none, patient denies any use.  Patient states clear understanding of the potential impact of substance use.  Substance abstinence/cessation counseling, education and resources provided and reviewed.     Services Utilizing/ADLS    Infusion Service: Prior to admission, patient utilizing? no  Home Health: Prior to admission, patient utilizing? no  DME: Prior to admission, yes scooter for long distances  Pulmonary/Cardiac Rehab: Prior to admission, no  Dialysis:  Prior to admission, yes MWF  Transplant Specialty Pharmacy:  Prior to admission, yes; [facility] .    Prior to admission, patient reports patient was independent with ADLS and was driving.  Patient reports patient is not able to care for self at this time due to compromised medical condition (as documented in medical record) and physical weakness..  Patient indicates a willingness to care for self once medically cleared to do so.    Insurance/Medications    Insured by   Payor/Plan Subscr  Sex Relation Sub. Ins. ID Effective Group Num   1. [employer] WORK[employer] * ANGELAROQUE NUNEZ 1902 Female  [id number]                                    [address] [facility] [address]   2. [place] - [place]* VIRGIE DUKE * 1960 Female  [id number]                                    [address]      Primary Insurance (for UNOS reporting): Public Insurance - Medicare FFS (Fee For Service)  Secondary Insurance (for UNOS reporting): Private Insurance    Patient reports patient is able to obtain and afford medications at this time and at time of discharge.    Living Will/Healthcare Power of     Patient states patient does not have a LW and/or HCPA.   provided education regarding LW and HCPA and the  "completion of forms.    Coping/Mental Health    Patient is coping adequately with the aid of  family members and friends.  Patient denies mental health difficulties at this present time. Pt does have a history of SI after work injury in 2015 and was hospitalized. Pt is currently followed by Dr. Sheth for depression. Pt utilizes Ativan and Vilbryd daily and reports as stable. Today, pt reports she is mostly anxious about her recurrent UTIs. She states "I think this is my third admit for them and to be honest, it knocks me out. I am so tired and feel horrible. I just hope this stops happening because it can be difficult to process sometimes". SW provided reflective listening and encouraged pt to keep expressing herself. SW validated pt feelings and encouraged her to keep listening to the medical team. She states "I feel like I am doing everything right and more and I keep getting sick". SW again encouraged pt to keep doing what she is doing and to listen to the MDs. Pt agreed and confirmed understanding.     Discharge Planning    At time of discharge, patient plans to return to patient's home under the care of Any.  Patients significant other will transport patient.  Per rounds today, expected discharge date has not been medically determined at this time. Patient and patients caregiver  verbalize understanding and are involved in treatment planning and discharge process.    Additional Concerns    Patient's caretaker denies additional needs and/or concerns at this time.  providing ongoing psychosocial support, education, resources and d/c planning as needed.  SW remains available. Patient's caregiver verbalizes understanding and agreement with information reviewed,  availability and how to access available resources as needed. Patient denies additional needs and/or concerns at this time. Patient verbalizes understanding and agreement with information reviewed, social work availability, " and how to access available resources as needed.   Clofazimine Counseling:  I discussed with the patient the risks of clofazimine including but not limited to skin and eye pigmentation, liver damage, nausea/vomiting, gastrointestinal bleeding and allergy.

## 2022-08-30 ENCOUNTER — LAB VISIT (OUTPATIENT)
Dept: LAB | Facility: HOSPITAL | Age: 62
End: 2022-08-30
Attending: FAMILY MEDICINE
Payer: MEDICARE

## 2022-08-30 DIAGNOSIS — E11.8 CONTROLLED TYPE 2 DIABETES MELLITUS WITH COMPLICATION, WITH LONG-TERM CURRENT USE OF INSULIN: ICD-10-CM

## 2022-08-30 DIAGNOSIS — Z79.4 CONTROLLED TYPE 2 DIABETES MELLITUS WITH COMPLICATION, WITH LONG-TERM CURRENT USE OF INSULIN: ICD-10-CM

## 2022-08-30 PROBLEM — R07.9 CHEST PAIN, MODERATE CORONARY ARTERY RISK: Status: ACTIVE | Noted: 2022-08-30

## 2022-08-30 LAB
ALBUMIN SERPL BCP-MCNC: 3.6 G/DL (ref 3.5–5.2)
ALP SERPL-CCNC: 107 U/L (ref 55–135)
ALT SERPL W/O P-5'-P-CCNC: 48 U/L (ref 10–44)
ANION GAP SERPL CALC-SCNC: 10 MMOL/L (ref 8–16)
AST SERPL-CCNC: 39 U/L (ref 10–40)
BILIRUB SERPL-MCNC: 0.5 MG/DL (ref 0.1–1)
BUN SERPL-MCNC: 18 MG/DL (ref 8–23)
CALCIUM SERPL-MCNC: 9.7 MG/DL (ref 8.7–10.5)
CHLORIDE SERPL-SCNC: 101 MMOL/L (ref 95–110)
CO2 SERPL-SCNC: 29 MMOL/L (ref 23–29)
CREAT SERPL-MCNC: 1 MG/DL (ref 0.5–1.4)
EST. GFR  (NO RACE VARIABLE): >60 ML/MIN/1.73 M^2
ESTIMATED AVG GLUCOSE: 192 MG/DL (ref 68–131)
GLUCOSE SERPL-MCNC: 187 MG/DL (ref 70–110)
HBA1C MFR BLD: 8.3 % (ref 4–5.6)
POTASSIUM SERPL-SCNC: 4.5 MMOL/L (ref 3.5–5.1)
PROT SERPL-MCNC: 7.1 G/DL (ref 6–8.4)
SODIUM SERPL-SCNC: 140 MMOL/L (ref 136–145)

## 2022-08-30 PROCEDURE — 36415 COLL VENOUS BLD VENIPUNCTURE: CPT | Mod: PN | Performed by: FAMILY MEDICINE

## 2022-08-30 PROCEDURE — 80053 COMPREHEN METABOLIC PANEL: CPT | Performed by: FAMILY MEDICINE

## 2022-08-30 PROCEDURE — 83036 HEMOGLOBIN GLYCOSYLATED A1C: CPT | Mod: 91 | Performed by: FAMILY MEDICINE

## 2022-08-31 PROBLEM — I15.0 RENOVASCULAR HYPERTENSION: Status: ACTIVE | Noted: 2022-08-31

## 2022-08-31 PROBLEM — I24.9 ACS (ACUTE CORONARY SYNDROME): Status: ACTIVE | Noted: 2022-08-31

## 2022-09-02 ENCOUNTER — TELEPHONE (OUTPATIENT)
Dept: CARDIOLOGY | Facility: CLINIC | Age: 62
End: 2022-09-02
Payer: MEDICARE

## 2022-09-02 NOTE — TELEPHONE ENCOUNTER
----- Message from Anastacia Hair sent at 9/2/2022  1:38 PM CDT -----  Contact: Patient  Type: Apoointment Request      Name of Caller:Patient     When is the first available appointment? N/a     Symptoms: stent put in Wednesday and still feeling bad. Nausea   Would the patient rather a call back or a response via [facility]? Call     Best Call Back Number: [phone]      Additional Information:

## 2022-09-02 NOTE — TELEPHONE ENCOUNTER
"Pt states " I had a stent put in wednesday and I am having chest pain really bad and sob and I need to know if this is normal " I advise pt that she needs to go to the Er since this is not normal. Pt vu and will go to ER   "

## 2022-09-03 PROBLEM — R11.0 NAUSEA: Status: ACTIVE | Noted: 2022-09-03

## 2022-09-03 PROBLEM — R79.89 ELEVATED TROPONIN: Status: ACTIVE | Noted: 2022-09-03

## 2022-09-03 PROBLEM — I31.9 PERICARDITIS: Status: ACTIVE | Noted: 2022-09-03

## 2022-09-07 ENCOUNTER — OFFICE VISIT (OUTPATIENT)
Dept: GASTROENTEROLOGY | Facility: CLINIC | Age: 62
End: 2022-09-07
Payer: MEDICARE

## 2022-09-07 VITALS
BODY MASS INDEX: 33.57 KG/M2 | HEART RATE: 72 BPM | RESPIRATION RATE: 16 BRPM | HEIGHT: 67 IN | SYSTOLIC BLOOD PRESSURE: 143 MMHG | DIASTOLIC BLOOD PRESSURE: 73 MMHG | WEIGHT: 213.88 LBS

## 2022-09-07 DIAGNOSIS — R13.19 ESOPHAGEAL DYSPHAGIA: Primary | ICD-10-CM

## 2022-09-07 DIAGNOSIS — K21.9 GASTROESOPHAGEAL REFLUX DISEASE, UNSPECIFIED WHETHER ESOPHAGITIS PRESENT: ICD-10-CM

## 2022-09-07 PROCEDURE — 1160F PR REVIEW ALL MEDS BY PRESCRIBER/CLIN PHARMACIST DOCUMENTED: ICD-10-PCS | Mod: CPTII,S$GLB,, | Performed by: INTERNAL MEDICINE

## 2022-09-07 PROCEDURE — 99999 PR PBB SHADOW E&M-EST. PATIENT-LVL III: ICD-10-PCS | Mod: PBBFAC,,, | Performed by: INTERNAL MEDICINE

## 2022-09-07 PROCEDURE — 3077F SYST BP >= 140 MM HG: CPT | Mod: CPTII,S$GLB,, | Performed by: INTERNAL MEDICINE

## 2022-09-07 PROCEDURE — 3066F NEPHROPATHY DOC TX: CPT | Mod: CPTII,S$GLB,, | Performed by: INTERNAL MEDICINE

## 2022-09-07 PROCEDURE — 3052F PR MOST RECENT HEMOGLOBIN A1C LEVEL 8.0 - < 9.0%: ICD-10-PCS | Mod: CPTII,S$GLB,, | Performed by: INTERNAL MEDICINE

## 2022-09-07 PROCEDURE — 3066F PR DOCUMENTATION OF TREATMENT FOR NEPHROPATHY: ICD-10-PCS | Mod: CPTII,S$GLB,, | Performed by: INTERNAL MEDICINE

## 2022-09-07 PROCEDURE — 3008F PR BODY MASS INDEX (BMI) DOCUMENTED: ICD-10-PCS | Mod: CPTII,S$GLB,, | Performed by: INTERNAL MEDICINE

## 2022-09-07 PROCEDURE — 1159F MED LIST DOCD IN RCRD: CPT | Mod: CPTII,S$GLB,, | Performed by: INTERNAL MEDICINE

## 2022-09-07 PROCEDURE — 1160F RVW MEDS BY RX/DR IN RCRD: CPT | Mod: CPTII,S$GLB,, | Performed by: INTERNAL MEDICINE

## 2022-09-07 PROCEDURE — 1159F PR MEDICATION LIST DOCUMENTED IN MEDICAL RECORD: ICD-10-PCS | Mod: CPTII,S$GLB,, | Performed by: INTERNAL MEDICINE

## 2022-09-07 PROCEDURE — 3052F HG A1C>EQUAL 8.0%<EQUAL 9.0%: CPT | Mod: CPTII,S$GLB,, | Performed by: INTERNAL MEDICINE

## 2022-09-07 PROCEDURE — 3078F PR MOST RECENT DIASTOLIC BLOOD PRESSURE < 80 MM HG: ICD-10-PCS | Mod: CPTII,S$GLB,, | Performed by: INTERNAL MEDICINE

## 2022-09-07 PROCEDURE — 99214 OFFICE O/P EST MOD 30 MIN: CPT | Mod: S$GLB,,, | Performed by: INTERNAL MEDICINE

## 2022-09-07 PROCEDURE — 3008F BODY MASS INDEX DOCD: CPT | Mod: CPTII,S$GLB,, | Performed by: INTERNAL MEDICINE

## 2022-09-07 PROCEDURE — 4010F ACE/ARB THERAPY RXD/TAKEN: CPT | Mod: CPTII,S$GLB,, | Performed by: INTERNAL MEDICINE

## 2022-09-07 PROCEDURE — 3077F PR MOST RECENT SYSTOLIC BLOOD PRESSURE >= 140 MM HG: ICD-10-PCS | Mod: CPTII,S$GLB,, | Performed by: INTERNAL MEDICINE

## 2022-09-07 PROCEDURE — 99999 PR PBB SHADOW E&M-EST. PATIENT-LVL III: CPT | Mod: PBBFAC,,, | Performed by: INTERNAL MEDICINE

## 2022-09-07 PROCEDURE — 4010F PR ACE/ARB THEARPY RXD/TAKEN: ICD-10-PCS | Mod: CPTII,S$GLB,, | Performed by: INTERNAL MEDICINE

## 2022-09-07 PROCEDURE — 99214 PR OFFICE/OUTPT VISIT, EST, LEVL IV, 30-39 MIN: ICD-10-PCS | Mod: S$GLB,,, | Performed by: INTERNAL MEDICINE

## 2022-09-07 PROCEDURE — 3078F DIAST BP <80 MM HG: CPT | Mod: CPTII,S$GLB,, | Performed by: INTERNAL MEDICINE

## 2022-09-07 RX ORDER — SUCRALFATE 1 G/1
1 TABLET ORAL EVERY 6 HOURS PRN
Qty: 40 TABLET | Refills: 1 | Status: SHIPPED | OUTPATIENT
Start: 2022-09-07 | End: 2023-07-14 | Stop reason: SDUPTHER

## 2022-09-07 RX ORDER — OMEPRAZOLE 40 MG/1
CAPSULE, DELAYED RELEASE ORAL
Qty: 90 CAPSULE | Refills: 3 | Status: ON HOLD | OUTPATIENT
Start: 2022-09-07 | End: 2023-05-31 | Stop reason: HOSPADM

## 2022-09-07 NOTE — PROGRESS NOTES
[facility] Note    CC: GERD, Dysphagia    HPI 62 y.o. female with multiple medical problems including renal transplant on Prograf, NAFLD and NSTEMI 2 weeks ago with stent placement now on Aspirin/Plavix, presents for follow up of long-standing, daily, moderate to severe GERD that is not controlled with Protonix 40 mg in AM and Pepcid 40 mg at night. She is having to take 10-15 Tums a day to control her symptoms. She also notes worsening dysphagia to solids,especially steak. Her last EGD was in June 2021, notable for Schatzki ring that was dilated (54 Fr), benign gastric polyps, H.pylori negative gastritis and peptic duodenitis.     She is followed by Dr. Ugalde for NAFLD, fibroscan earlier this year with F4 fibrosis. His plan is to repeat fibroscan in several months.     Past Medical History:   Diagnosis Date    Anemia     Anemia in chronic kidney disease, on chronic dialysis 7/12/2017    Anxiety and depression     Aplastic anemia with parvovirus B19 infection 5/27/2019    Arthritis     Asthma     allergic airway    CKD stage III (moderate) 2/18/2019    Colon polyp     Depression     Diabetes mellitus     Diverticulosis     Encounter for blood transfusion     Eosinophilia     ESRD (end stage renal disease)     stage V, due for living donor 9/2018    ESRD on dialysis     GERD (gastroesophageal reflux disease)     Gout     Gout     Hyperlipidemia     Hypertension     Hypertriglyceridemia without hypercholesterolemia 6/9/2019    Low back pain     Low HDL (under 40) 6/9/2019    MRSA carrier     Neutropenia, unspecified 5/8/2019    Obesity     Renal disorder     Rotator cuff syndrome--left     Thyroid disease     Ulnar neuropathy of right upper extremity     Uncontrolled type 2 diabetes mellitus with hyperglycemia        Allergies and Medications reviewed     Review of Systems  General ROS: negative for - chills, fever; + intentional weight loss  Cardiovascular ROS: no chest pain or dyspnea on  "exertion  Gastrointestinal ROS: + GERD, + dysphagia, no blood in stool    Physical Examination  BP (!) 143/73 (BP Location: Right arm, Patient Position: Sitting)   Pulse 72   Resp 16   Ht 5' 7" (1.702 m)   Wt 97 kg (213 lb 13.5 oz)   LMP 02/28/2012   BMI 33.49 kg/m²   General appearance: alert, cooperative, no distress  HENT: Normocephalic, atraumatic, neck symmetrical, no nasal discharge, sclera anicteric   Lungs: clear to auscultation bilaterally, symmetric chest wall expansion bilaterally  Heart: regular rate and rhythm without rub; no displacement of the PMI   Abdomen: soft, nontender,nondistended, BS active  Extremities: extremities symmetric; no clubbing, cyanosis, or edema        Labs:  Lab Results   Component Value Date    WBC 9.26 09/02/2022    HGB 10.9 (L) 09/02/2022    HCT 34.9 (L) 09/02/2022    MCV 87 09/02/2022     09/02/2022       CMP  Sodium   Date Value Ref Range Status   09/02/2022 143 136 - 145 mmol/L Final     Potassium   Date Value Ref Range Status   09/02/2022 4.8 3.5 - 5.1 mmol/L Final     Chloride   Date Value Ref Range Status   09/02/2022 100 95 - 110 mmol/L Final     CO2   Date Value Ref Range Status   09/02/2022 30 22 - 31 mmol/L Final     Glucose   Date Value Ref Range Status   09/02/2022 138 (H) 70 - 110 mg/dL Final     Comment:     The ADA recommends the following guidelines for fasting glucose:    Normal:       less than 100 mg/dL    Prediabetes:  100 mg/dL to 125 mg/dL    Diabetes:     126 mg/dL or higher       BUN   Date Value Ref Range Status   09/02/2022 19 (H) 7 - 18 mg/dL Final     Creatinine   Date Value Ref Range Status   09/02/2022 0.86 0.50 - 1.40 mg/dL Final     Calcium   Date Value Ref Range Status   09/02/2022 8.9 8.4 - 10.2 mg/dL Final     Total Protein   Date Value Ref Range Status   09/02/2022 7.7 6.0 - 8.4 g/dL Final     Albumin   Date Value Ref Range Status   09/02/2022 4.2 3.5 - 5.2 g/dL Final     Total Bilirubin   Date Value Ref Range Status   09/02/2022 " 0.5 0.2 - 1.3 mg/dL Final     Alkaline Phosphatase   Date Value Ref Range Status   09/02/2022 123 38 - 145 U/L Final     AST   Date Value Ref Range Status   09/02/2022 48 (H) 14 - 36 U/L Final     ALT   Date Value Ref Range Status   09/02/2022 52 (H) 0 - 35 U/L Final     Anion Gap   Date Value Ref Range Status   09/02/2022 13 8 - 16 mmol/L Final     eGFR if    Date Value Ref Range Status   06/09/2022 >60.0 >60 mL/min/1.73 m^2 Final     eGFR if non    Date Value Ref Range Status   06/09/2022 59.7 (A) >60 mL/min/1.73 m^2 Final     Comment:     Calculation used to obtain the estimated glomerular filtration  rate (eGFR) is the CKD-EPI equation.            Imaging:  ABdominal ultrasound was independently visualized and reviewed by me and showed 1. Hepatomegaly with increased liver echogenicity as can be seen with fatty infiltration  2. Evidence of cholecystectomy  3. End-stage native kidneys bilaterally    Assessment:   62 y.o. female with multiple medical problems including renal transplant on Prograf, NAFLD and NSTEMI 2 weeks ago with stent placement now on Aspirin/Plavix, presents for follow up of long-standing, daily, moderate to severe GERD and dysphagia to solids.     Plan:  -Stop Protonix, change to Omeprazole 40 mg BID x 14 days then daily  -Continue Pepcid at night for now  -Carafate PRN (may take TID with meals)  -Esophagram  -We discussed due to recent stent placement and inability to hold anticoagulation we will defer endoscopy with dilation at this time- she is to take small bites, chew thoroughly, drink plenty of liquids when eating and not isidro     Marisol Bryson MD  [facility]  [address], [address]  [place] [address]  Office: [phone]  Fax: [phone]

## 2022-09-08 ENCOUNTER — LAB VISIT (OUTPATIENT)
Dept: LAB | Facility: HOSPITAL | Age: 62
End: 2022-09-08
Attending: PHYSICIAN ASSISTANT
Payer: MEDICARE

## 2022-09-08 ENCOUNTER — OFFICE VISIT (OUTPATIENT)
Dept: CARDIOLOGY | Facility: CLINIC | Age: 62
End: 2022-09-08
Payer: MEDICARE

## 2022-09-08 VITALS
DIASTOLIC BLOOD PRESSURE: 78 MMHG | WEIGHT: 214.75 LBS | SYSTOLIC BLOOD PRESSURE: 167 MMHG | HEART RATE: 70 BPM | BODY MASS INDEX: 33.71 KG/M2 | HEIGHT: 67 IN

## 2022-09-08 DIAGNOSIS — E11.22 HYPERTENSION ASSOCIATED WITH STAGE 3 CHRONIC KIDNEY DISEASE DUE TO TYPE 2 DIABETES MELLITUS: Chronic | ICD-10-CM

## 2022-09-08 DIAGNOSIS — Z94.0 KIDNEY TRANSPLANT RECIPIENT: ICD-10-CM

## 2022-09-08 DIAGNOSIS — E03.9 ACQUIRED HYPOTHYROIDISM: Chronic | ICD-10-CM

## 2022-09-08 DIAGNOSIS — E78.2 MIXED DIABETIC HYPERLIPIDEMIA ASSOCIATED WITH TYPE 2 DIABETES MELLITUS: Chronic | ICD-10-CM

## 2022-09-08 DIAGNOSIS — Z79.4 CONTROLLED TYPE 2 DIABETES MELLITUS WITH COMPLICATION, WITH LONG-TERM CURRENT USE OF INSULIN: Chronic | ICD-10-CM

## 2022-09-08 DIAGNOSIS — E11.69 MIXED DIABETIC HYPERLIPIDEMIA ASSOCIATED WITH TYPE 2 DIABETES MELLITUS: Chronic | ICD-10-CM

## 2022-09-08 DIAGNOSIS — I25.10 CORONARY ARTERY DISEASE INVOLVING NATIVE CORONARY ARTERY OF NATIVE HEART WITHOUT ANGINA PECTORIS: ICD-10-CM

## 2022-09-08 DIAGNOSIS — D63.8 ANEMIA OF CHRONIC DISEASE: Chronic | ICD-10-CM

## 2022-09-08 DIAGNOSIS — N18.31 STAGE 3A CHRONIC KIDNEY DISEASE: Chronic | ICD-10-CM

## 2022-09-08 DIAGNOSIS — E11.8 CONTROLLED TYPE 2 DIABETES MELLITUS WITH COMPLICATION, WITH LONG-TERM CURRENT USE OF INSULIN: Chronic | ICD-10-CM

## 2022-09-08 DIAGNOSIS — I12.9 HYPERTENSION ASSOCIATED WITH STAGE 3 CHRONIC KIDNEY DISEASE DUE TO TYPE 2 DIABETES MELLITUS: Chronic | ICD-10-CM

## 2022-09-08 DIAGNOSIS — I31.9 PERICARDITIS, UNSPECIFIED CHRONICITY, UNSPECIFIED TYPE: Primary | ICD-10-CM

## 2022-09-08 DIAGNOSIS — N18.30 HYPERTENSION ASSOCIATED WITH STAGE 3 CHRONIC KIDNEY DISEASE DUE TO TYPE 2 DIABETES MELLITUS: Chronic | ICD-10-CM

## 2022-09-08 DIAGNOSIS — I15.0 RENOVASCULAR HYPERTENSION: ICD-10-CM

## 2022-09-08 DIAGNOSIS — I31.9 PERICARDITIS, UNSPECIFIED CHRONICITY, UNSPECIFIED TYPE: ICD-10-CM

## 2022-09-08 LAB
ALBUMIN SERPL BCP-MCNC: 3.8 G/DL (ref 3.5–5.2)
ALP SERPL-CCNC: 96 U/L (ref 55–135)
ALT SERPL W/O P-5'-P-CCNC: 38 U/L (ref 10–44)
ANION GAP SERPL CALC-SCNC: 9 MMOL/L (ref 8–16)
AST SERPL-CCNC: 31 U/L (ref 10–40)
BASOPHILS # BLD AUTO: 0.05 K/UL (ref 0–0.2)
BASOPHILS NFR BLD: 0.5 % (ref 0–1.9)
BILIRUB SERPL-MCNC: 0.5 MG/DL (ref 0.1–1)
BUN SERPL-MCNC: 19 MG/DL (ref 8–23)
CALCIUM SERPL-MCNC: 9.9 MG/DL (ref 8.7–10.5)
CHLORIDE SERPL-SCNC: 104 MMOL/L (ref 95–110)
CO2 SERPL-SCNC: 30 MMOL/L (ref 23–29)
CREAT SERPL-MCNC: 1.2 MG/DL (ref 0.5–1.4)
CRP SERPL-MCNC: 29 MG/L (ref 0–8.2)
DIFFERENTIAL METHOD: ABNORMAL
EOSINOPHIL # BLD AUTO: 1.1 K/UL (ref 0–0.5)
EOSINOPHIL NFR BLD: 10.1 % (ref 0–8)
ERYTHROCYTE [DISTWIDTH] IN BLOOD BY AUTOMATED COUNT: 15.5 % (ref 11.5–14.5)
ERYTHROCYTE [SEDIMENTATION RATE] IN BLOOD BY PHOTOMETRIC METHOD: 22 MM/HR (ref 0–36)
EST. GFR  (NO RACE VARIABLE): 50.5 ML/MIN/1.73 M^2
GLUCOSE SERPL-MCNC: 110 MG/DL (ref 70–110)
HCT VFR BLD AUTO: 34.9 % (ref 37–48.5)
HGB BLD-MCNC: 11.2 G/DL (ref 12–16)
IMM GRANULOCYTES # BLD AUTO: 0.05 K/UL (ref 0–0.04)
IMM GRANULOCYTES NFR BLD AUTO: 0.5 % (ref 0–0.5)
LYMPHOCYTES # BLD AUTO: 1.4 K/UL (ref 1–4.8)
LYMPHOCYTES NFR BLD: 12.7 % (ref 18–48)
MCH RBC QN AUTO: 27.7 PG (ref 27–31)
MCHC RBC AUTO-ENTMCNC: 32.1 G/DL (ref 32–36)
MCV RBC AUTO: 86 FL (ref 82–98)
MONOCYTES # BLD AUTO: 0.7 K/UL (ref 0.3–1)
MONOCYTES NFR BLD: 6.1 % (ref 4–15)
NEUTROPHILS # BLD AUTO: 7.8 K/UL (ref 1.8–7.7)
NEUTROPHILS NFR BLD: 70.1 % (ref 38–73)
NRBC BLD-RTO: 0 /100 WBC
PLATELET # BLD AUTO: 270 K/UL (ref 150–450)
PMV BLD AUTO: 9.6 FL (ref 9.2–12.9)
POTASSIUM SERPL-SCNC: 4.7 MMOL/L (ref 3.5–5.1)
PROT SERPL-MCNC: 7.6 G/DL (ref 6–8.4)
RBC # BLD AUTO: 4.05 M/UL (ref 4–5.4)
SODIUM SERPL-SCNC: 143 MMOL/L (ref 136–145)
WBC # BLD AUTO: 11.1 K/UL (ref 3.9–12.7)

## 2022-09-08 PROCEDURE — 3052F PR MOST RECENT HEMOGLOBIN A1C LEVEL 8.0 - < 9.0%: ICD-10-PCS | Mod: CPTII,S$GLB,, | Performed by: PHYSICIAN ASSISTANT

## 2022-09-08 PROCEDURE — 99214 PR OFFICE/OUTPT VISIT, EST, LEVL IV, 30-39 MIN: ICD-10-PCS | Mod: S$GLB,,, | Performed by: PHYSICIAN ASSISTANT

## 2022-09-08 PROCEDURE — 3066F NEPHROPATHY DOC TX: CPT | Mod: CPTII,S$GLB,, | Performed by: PHYSICIAN ASSISTANT

## 2022-09-08 PROCEDURE — 3078F PR MOST RECENT DIASTOLIC BLOOD PRESSURE < 80 MM HG: ICD-10-PCS | Mod: CPTII,S$GLB,, | Performed by: PHYSICIAN ASSISTANT

## 2022-09-08 PROCEDURE — 99999 PR PBB SHADOW E&M-EST. PATIENT-LVL IV: ICD-10-PCS | Mod: PBBFAC,,, | Performed by: PHYSICIAN ASSISTANT

## 2022-09-08 PROCEDURE — 3077F SYST BP >= 140 MM HG: CPT | Mod: CPTII,S$GLB,, | Performed by: PHYSICIAN ASSISTANT

## 2022-09-08 PROCEDURE — 1159F MED LIST DOCD IN RCRD: CPT | Mod: CPTII,S$GLB,, | Performed by: PHYSICIAN ASSISTANT

## 2022-09-08 PROCEDURE — 3077F PR MOST RECENT SYSTOLIC BLOOD PRESSURE >= 140 MM HG: ICD-10-PCS | Mod: CPTII,S$GLB,, | Performed by: PHYSICIAN ASSISTANT

## 2022-09-08 PROCEDURE — 86140 C-REACTIVE PROTEIN: CPT | Performed by: PHYSICIAN ASSISTANT

## 2022-09-08 PROCEDURE — 99214 OFFICE O/P EST MOD 30 MIN: CPT | Mod: S$GLB,,, | Performed by: PHYSICIAN ASSISTANT

## 2022-09-08 PROCEDURE — 3066F PR DOCUMENTATION OF TREATMENT FOR NEPHROPATHY: ICD-10-PCS | Mod: CPTII,S$GLB,, | Performed by: PHYSICIAN ASSISTANT

## 2022-09-08 PROCEDURE — 85025 COMPLETE CBC W/AUTO DIFF WBC: CPT | Performed by: PHYSICIAN ASSISTANT

## 2022-09-08 PROCEDURE — 3008F PR BODY MASS INDEX (BMI) DOCUMENTED: ICD-10-PCS | Mod: CPTII,S$GLB,, | Performed by: PHYSICIAN ASSISTANT

## 2022-09-08 PROCEDURE — 3078F DIAST BP <80 MM HG: CPT | Mod: CPTII,S$GLB,, | Performed by: PHYSICIAN ASSISTANT

## 2022-09-08 PROCEDURE — 85652 RBC SED RATE AUTOMATED: CPT | Performed by: PHYSICIAN ASSISTANT

## 2022-09-08 PROCEDURE — 1159F PR MEDICATION LIST DOCUMENTED IN MEDICAL RECORD: ICD-10-PCS | Mod: CPTII,S$GLB,, | Performed by: PHYSICIAN ASSISTANT

## 2022-09-08 PROCEDURE — 80053 COMPREHEN METABOLIC PANEL: CPT | Performed by: PHYSICIAN ASSISTANT

## 2022-09-08 PROCEDURE — 4010F PR ACE/ARB THEARPY RXD/TAKEN: ICD-10-PCS | Mod: CPTII,S$GLB,, | Performed by: PHYSICIAN ASSISTANT

## 2022-09-08 PROCEDURE — 3052F HG A1C>EQUAL 8.0%<EQUAL 9.0%: CPT | Mod: CPTII,S$GLB,, | Performed by: PHYSICIAN ASSISTANT

## 2022-09-08 PROCEDURE — 36415 COLL VENOUS BLD VENIPUNCTURE: CPT | Mod: PO | Performed by: PHYSICIAN ASSISTANT

## 2022-09-08 PROCEDURE — 4010F ACE/ARB THERAPY RXD/TAKEN: CPT | Mod: CPTII,S$GLB,, | Performed by: PHYSICIAN ASSISTANT

## 2022-09-08 PROCEDURE — 99999 PR PBB SHADOW E&M-EST. PATIENT-LVL IV: CPT | Mod: PBBFAC,,, | Performed by: PHYSICIAN ASSISTANT

## 2022-09-08 PROCEDURE — 3008F BODY MASS INDEX DOCD: CPT | Mod: CPTII,S$GLB,, | Performed by: PHYSICIAN ASSISTANT

## 2022-09-08 NOTE — PROGRESS NOTES
Subjective:    Patient ID:  Andra Newell is a 62 y.o. female who presents for follow-up of Hospital Follow Up        HPI  Ms. Newell is a very pleasant lady who presents today for routine follow up after 2 hospitalizations at [facility]: 9/2-9/4 and 8/30-8/31. She presented 8/30 with c/o CP. She was scheduled for coronary angiography which revealed significant stenosis in the diagonal. She underwent PCI with HAKAN. She was discharged home with lopressor and Imdur.     She then presented on 9/2 with CP. Her CE were mildly elevated. Echo at that time showed normal LVSF. Sed rate and CRP were elevated. She was diagnosed with suspected pericarditis. She is s/p L kidney tx in 2019 and is thus unable to take NSAIDs. She was improving, so colchicine was deferred.       She states she feels slightly better, but is still having CP. Had a particularly sharp episodes when she got here today. No PND, orthopnea, or LE edema.     She also c/o significant daytime sleepiness. She does snore. She had a normal home sleep study last year. Dr. Vaughn ordered a polysomnogram, but she was not contacted to have it done.     She also has a fentanyl pain pump -- dose is increased every 2 weeks.     Review of Systems   Constitutional: Positive for malaise/fatigue. Negative for chills, diaphoresis, fever, weight gain and weight loss.   HENT:  Negative for sore throat.    Eyes:  Negative for blurred vision, vision loss in left eye, vision loss in right eye and visual disturbance.   Cardiovascular:  Positive for chest pain. Negative for claudication, dyspnea on exertion, leg swelling, near-syncope, orthopnea, palpitations, paroxysmal nocturnal dyspnea and syncope.   Respiratory:  Positive for snoring. Negative for cough, hemoptysis, shortness of breath, sputum production and wheezing.    Endocrine: Negative for cold intolerance and heat intolerance.   Hematologic/Lymphatic: Negative for adenopathy. Bruises/bleeds easily.   Skin:   "Negative for rash.   Musculoskeletal:  Positive for back pain and joint pain. Negative for falls, muscle weakness and myalgias.   Gastrointestinal:  Negative for abdominal pain, change in bowel habit, constipation, diarrhea, melena and nausea.   Genitourinary:  Negative for bladder incontinence.   Neurological:  Positive for excessive daytime sleepiness. Negative for dizziness, focal weakness, headaches, light-headedness, numbness and weakness.   Psychiatric/Behavioral:  Negative for altered mental status.       Vitals:    09/08/22 1348   BP: (!) 167/78   BP Location: Left arm   Patient Position: Sitting   BP Method: Medium (Automatic)   Pulse: 70   Weight: 97.4 kg (214 lb 11.7 oz)   Height: 5' 7" (1.702 m)   Body mass index is 33.63 kg/m².    Objective:    Physical Exam  Constitutional:       General: She is not in acute distress.     Appearance: She is well-developed.   HENT:      Head: Normocephalic and atraumatic.   Eyes:      General: No scleral icterus.     Conjunctiva/sclera: Conjunctivae normal.      Pupils: Pupils are equal, round, and reactive to light.   Neck:      Vascular: No JVD.      Trachea: No tracheal deviation.   Cardiovascular:      Rate and Rhythm: Normal rate and regular rhythm.      Heart sounds: No murmur heard.    No friction rub. No gallop.   Pulmonary:      Effort: Pulmonary effort is normal. No respiratory distress.      Breath sounds: Normal breath sounds. No wheezing or rales.   Chest:      Chest wall: No tenderness.   Abdominal:      General: Bowel sounds are normal. There is no distension.      Palpations: Abdomen is soft.      Tenderness: There is no abdominal tenderness.   Musculoskeletal:         General: No tenderness.      Cervical back: Neck supple.   Skin:     General: Skin is warm and dry.      Findings: No erythema or rash.   Neurological:      Mental Status: She is alert and oriented to person, place, and time.   Psychiatric:         Behavior: Behavior normal.       "   Assessment:       Problem List Items Addressed This Visit          Cardiology Problems    Hypertension associated with stage 3 chronic kidney disease due to type 2 diabetes mellitus (Chronic)    Mixed diabetic hyperlipidemia associated with type 2 diabetes mellitus (Chronic)    Coronary artery disease involving native coronary artery of native heart without angina pectoris    Pericarditis - Primary    Relevant Orders    Sedimentation rate    C-REACTIVE PROTEIN    CBC Auto Differential    COMPREHENSIVE METABOLIC PANEL    Renovascular hypertension       Other    Acquired hypothyroidism (Chronic)    Anemia of chronic disease (Chronic)    Controlled type 2 diabetes mellitus with complication, with long-term current use of insulin (Chronic)    Stage 3a chronic kidney disease (Chronic)    Kidney transplant recipient          Plan:       Repeat labs including Sed rate and CRP now.   Agree with Polysomnogram as ordered by Dr. Vaughn.   F/U with Pain Management RE: lethargy to see if this could be secondary to increased Fentanyl dose in pain pump.   Continue current medications.   If inflammatory markers significantly increased, will consider colchicine for suspected pericarditis.   Monitor BP -- management per Nephrology/Kidney [place].   F/U with Dr. Dang in 2-3 months.

## 2022-09-09 ENCOUNTER — OFFICE VISIT (OUTPATIENT)
Dept: PRIMARY CARE CLINIC | Facility: CLINIC | Age: 62
End: 2022-09-09
Payer: MEDICARE

## 2022-09-09 VITALS
BODY MASS INDEX: 33.67 KG/M2 | DIASTOLIC BLOOD PRESSURE: 60 MMHG | WEIGHT: 214.5 LBS | HEIGHT: 67 IN | OXYGEN SATURATION: 96 % | SYSTOLIC BLOOD PRESSURE: 120 MMHG | HEART RATE: 64 BPM | RESPIRATION RATE: 18 BRPM

## 2022-09-09 DIAGNOSIS — Z79.4 CONTROLLED TYPE 2 DIABETES MELLITUS WITH COMPLICATION, WITH LONG-TERM CURRENT USE OF INSULIN: Primary | Chronic | ICD-10-CM

## 2022-09-09 DIAGNOSIS — I25.10 CORONARY ARTERY DISEASE INVOLVING NATIVE CORONARY ARTERY OF NATIVE HEART, UNSPECIFIED WHETHER ANGINA PRESENT: ICD-10-CM

## 2022-09-09 DIAGNOSIS — I12.9 HYPERTENSION ASSOCIATED WITH STAGE 3 CHRONIC KIDNEY DISEASE DUE TO TYPE 2 DIABETES MELLITUS: Chronic | ICD-10-CM

## 2022-09-09 DIAGNOSIS — I31.9 PERICARDITIS, UNSPECIFIED CHRONICITY, UNSPECIFIED TYPE: ICD-10-CM

## 2022-09-09 DIAGNOSIS — E11.22 HYPERTENSION ASSOCIATED WITH STAGE 3 CHRONIC KIDNEY DISEASE DUE TO TYPE 2 DIABETES MELLITUS: Chronic | ICD-10-CM

## 2022-09-09 DIAGNOSIS — G89.29 CHRONIC BILATERAL LOW BACK PAIN WITHOUT SCIATICA: Chronic | ICD-10-CM

## 2022-09-09 DIAGNOSIS — E11.8 CONTROLLED TYPE 2 DIABETES MELLITUS WITH COMPLICATION, WITH LONG-TERM CURRENT USE OF INSULIN: Primary | Chronic | ICD-10-CM

## 2022-09-09 DIAGNOSIS — M54.50 CHRONIC BILATERAL LOW BACK PAIN WITHOUT SCIATICA: Chronic | ICD-10-CM

## 2022-09-09 DIAGNOSIS — N18.30 HYPERTENSION ASSOCIATED WITH STAGE 3 CHRONIC KIDNEY DISEASE DUE TO TYPE 2 DIABETES MELLITUS: Chronic | ICD-10-CM

## 2022-09-09 PROCEDURE — 3008F PR BODY MASS INDEX (BMI) DOCUMENTED: ICD-10-PCS | Mod: CPTII,S$GLB,, | Performed by: FAMILY MEDICINE

## 2022-09-09 PROCEDURE — 1160F RVW MEDS BY RX/DR IN RCRD: CPT | Mod: CPTII,S$GLB,, | Performed by: FAMILY MEDICINE

## 2022-09-09 PROCEDURE — 99999 PR PBB SHADOW E&M-EST. PATIENT-LVL V: ICD-10-PCS | Mod: PBBFAC,,, | Performed by: FAMILY MEDICINE

## 2022-09-09 PROCEDURE — 99999 PR PBB SHADOW E&M-EST. PATIENT-LVL V: CPT | Mod: PBBFAC,,, | Performed by: FAMILY MEDICINE

## 2022-09-09 PROCEDURE — 3074F SYST BP LT 130 MM HG: CPT | Mod: CPTII,S$GLB,, | Performed by: FAMILY MEDICINE

## 2022-09-09 PROCEDURE — 99499 RISK ADDL DX/OHS AUDIT: ICD-10-PCS | Mod: S$GLB,,, | Performed by: FAMILY MEDICINE

## 2022-09-09 PROCEDURE — 99214 OFFICE O/P EST MOD 30 MIN: CPT | Mod: S$GLB,,, | Performed by: FAMILY MEDICINE

## 2022-09-09 PROCEDURE — 99214 PR OFFICE/OUTPT VISIT, EST, LEVL IV, 30-39 MIN: ICD-10-PCS | Mod: S$GLB,,, | Performed by: FAMILY MEDICINE

## 2022-09-09 PROCEDURE — 3052F PR MOST RECENT HEMOGLOBIN A1C LEVEL 8.0 - < 9.0%: ICD-10-PCS | Mod: CPTII,S$GLB,, | Performed by: FAMILY MEDICINE

## 2022-09-09 PROCEDURE — 99499 UNLISTED E&M SERVICE: CPT | Mod: S$GLB,,, | Performed by: FAMILY MEDICINE

## 2022-09-09 PROCEDURE — 1160F PR REVIEW ALL MEDS BY PRESCRIBER/CLIN PHARMACIST DOCUMENTED: ICD-10-PCS | Mod: CPTII,S$GLB,, | Performed by: FAMILY MEDICINE

## 2022-09-09 PROCEDURE — 3078F PR MOST RECENT DIASTOLIC BLOOD PRESSURE < 80 MM HG: ICD-10-PCS | Mod: CPTII,S$GLB,, | Performed by: FAMILY MEDICINE

## 2022-09-09 PROCEDURE — 3052F HG A1C>EQUAL 8.0%<EQUAL 9.0%: CPT | Mod: CPTII,S$GLB,, | Performed by: FAMILY MEDICINE

## 2022-09-09 PROCEDURE — 3074F PR MOST RECENT SYSTOLIC BLOOD PRESSURE < 130 MM HG: ICD-10-PCS | Mod: CPTII,S$GLB,, | Performed by: FAMILY MEDICINE

## 2022-09-09 PROCEDURE — 1159F MED LIST DOCD IN RCRD: CPT | Mod: CPTII,S$GLB,, | Performed by: FAMILY MEDICINE

## 2022-09-09 PROCEDURE — 3066F NEPHROPATHY DOC TX: CPT | Mod: CPTII,S$GLB,, | Performed by: FAMILY MEDICINE

## 2022-09-09 PROCEDURE — 4010F PR ACE/ARB THEARPY RXD/TAKEN: ICD-10-PCS | Mod: CPTII,S$GLB,, | Performed by: FAMILY MEDICINE

## 2022-09-09 PROCEDURE — 4010F ACE/ARB THERAPY RXD/TAKEN: CPT | Mod: CPTII,S$GLB,, | Performed by: FAMILY MEDICINE

## 2022-09-09 PROCEDURE — 1159F PR MEDICATION LIST DOCUMENTED IN MEDICAL RECORD: ICD-10-PCS | Mod: CPTII,S$GLB,, | Performed by: FAMILY MEDICINE

## 2022-09-09 PROCEDURE — 3008F BODY MASS INDEX DOCD: CPT | Mod: CPTII,S$GLB,, | Performed by: FAMILY MEDICINE

## 2022-09-09 PROCEDURE — 3066F PR DOCUMENTATION OF TREATMENT FOR NEPHROPATHY: ICD-10-PCS | Mod: CPTII,S$GLB,, | Performed by: FAMILY MEDICINE

## 2022-09-09 PROCEDURE — 3078F DIAST BP <80 MM HG: CPT | Mod: CPTII,S$GLB,, | Performed by: FAMILY MEDICINE

## 2022-09-09 NOTE — PROGRESS NOTES
THIS DOCUMENT WAS MADE IN PART WITH VOICE RECOGNITION SOFTWARE.  OCCASIONALLY THIS SOFTWARE WILL MISINTERPRET WORDS OR PHRASES.      Primary Care Provider Appointment   [facility] ([facility])  [address], [place] [address]   Ph: [phone]  Fax: [phone]      Patient ID: Andra Newell is a 62 y.o. female.    Andra was seen today for hypertension, diabetes and hospital follow up.    Diagnoses and all orders for this visit:    Controlled type 2 diabetes mellitus with complication, with long-term current use of insulin  Most recent A1c was mildly elevated.  She did have an increase in glucose readings during her acute illness in the hospital but feels that is improving now.  We will continue to monitor, follow healthy diet but no medication changes today    Hypertension associated with stage 3 chronic kidney disease due to type 2 diabetes mellitus  Blood pressure is stable and satisfactory    Chronic bilateral low back pain without sciatica  Try tizanidine in the evening time to help with pain and sleep.  Try to reduce the dosage of the alprazolam as this may be contributing to her daytime fatigue  .  Coronary artery disease involving native coronary artery of native heart, unspecified whether angina present  Pericarditis, unspecified chronicity, unspecified type  Reviewed recent hospitalizations.  She appears to be doing well and will continue to follow with Cardiology.  She does still have some mild chest tightness but overall improving.      Follow Up:  6-8 weeks      Health Maintenance         Date Due Completion Date    Shingles Vaccine (1 of 2) Never done ---    Eye Exam 11/18/2021 11/18/2020    COVID-19 Vaccine (3 - Booster for Pfizer series) 12/14/2021 7/14/2021    Mammogram 08/05/2022 8/5/2021    Influenza Vaccine (1) 09/01/2022 1/31/2022    Override on 10/6/2020: Done (completed at [facility])    Diabetes Urine Screening 11/17/2022 11/17/2021     Hemoglobin A1c 02/28/2023 8/30/2022    Foot Exam 03/31/2023 3/31/2022    Override on 10/2/2019: Done    Lipid Panel 08/31/2023 8/31/2022    Cervical Cancer Screening 09/04/2023 9/4/2018    High Dose Statin 09/08/2023 9/8/2022    Colorectal Cancer Screening 05/09/2024 5/9/2019    TETANUS VACCINE 07/12/2027 7/12/2017            Subjective:     Chief Complaint   Patient presents with    Hypertension     3 month f/u visit     Diabetes     3 month f/u visit     Hospital Follow Up     Hospital f/u; has a stent concern she would like to address today.     I have reviewed the information entered by the ancillary staff regarding the chief complaint as well as the related history.    HPI    Patient is a/an 62 y.o.  female   RISK of ADMIT/ED: 45    Follow-up hospital.  A few weeks ago she presented to the hospital with cardiac symptoms, underwent stent placement.  She did well but a few weeks later returned with chest pain and was diagnosed with pericarditis.  But she was stabilized and discharged home.  She has fatigue but does not have significant chest pain or shortness of breath.    Increase in fatigue, still waiting for sleep consult  Nightmares        For complete problem list, past medical history, surgical history, social history, etc., see appropriate section in the electronic medical record    Review of Systems   Constitutional:  Negative for activity change and unexpected weight change.   HENT:  Positive for trouble swallowing. Negative for hearing loss and rhinorrhea.    Eyes:  Negative for discharge and visual disturbance.   Respiratory:  Positive for chest tightness. Negative for wheezing.    Cardiovascular:  Positive for chest pain. Negative for palpitations.   Gastrointestinal:  Negative for blood in stool, constipation, diarrhea and vomiting.   Endocrine: Negative for polydipsia and polyuria.   Genitourinary:  Negative for difficulty urinating, dysuria, hematuria and menstrual problem.   Musculoskeletal:   "Positive for arthralgias and joint swelling. Negative for neck pain.   Neurological:  Positive for weakness. Negative for headaches.   Psychiatric/Behavioral:  Negative for confusion and dysphoric mood.      Objective     Physical Exam  HENT:      Mouth/Throat:      Mouth: Mucous membranes are moist.      Pharynx: No oropharyngeal exudate or posterior oropharyngeal erythema.   Eyes:      General: No scleral icterus.     Conjunctiva/sclera: Conjunctivae normal.   Cardiovascular:      Rate and Rhythm: Normal rate and regular rhythm.      Heart sounds: Murmur heard.   Systolic murmur is present with a grade of 2/6.     No friction rub.   Pulmonary:      Effort: Pulmonary effort is normal. No respiratory distress.      Breath sounds: Normal breath sounds. No wheezing.     Vitals:    09/09/22 1507   BP: 120/60   BP Location: Right arm   Patient Position: Sitting   BP Method: Medium (Manual)   Pulse: 64   Resp: 18   SpO2: 96%   Weight: 97.3 kg (214 lb 8.1 oz)   Height: 5' 7" (1.702 m)       RECENT LABS:    Lab Results   Component Value Date    WBC 8.22 09/13/2022    HGB 10.4 (L) 09/13/2022    HCT 32.9 (L) 09/13/2022     09/13/2022    CHOL 156 08/31/2022    TRIG 198 (H) 08/31/2022    HDL 20 (L) 08/31/2022    ALT 38 (H) 09/13/2022    AST 44 (H) 09/13/2022     09/13/2022    K 4.2 09/13/2022     09/13/2022    CREATININE 0.89 09/13/2022    BUN 14 09/13/2022    CO2 29 09/13/2022    TSH 0.934 05/19/2022    INR 1.1 09/13/2022    HGBA1C 8.3 (H) 08/30/2022       Results for orders placed or performed in visit on 09/08/22   Sedimentation rate   Result Value Ref Range    Sed Rate 22 0 - 36 mm/Hr   C-REACTIVE PROTEIN   Result Value Ref Range    CRP 29.0 (H) 0.0 - 8.2 mg/L   CBC Auto Differential   Result Value Ref Range    WBC 11.10 3.90 - 12.70 K/uL    RBC 4.05 4.00 - 5.40 M/uL    Hemoglobin 11.2 (L) 12.0 - 16.0 g/dL    Hematocrit 34.9 (L) 37.0 - 48.5 %    MCV 86 82 - 98 fL    MCH 27.7 27.0 - 31.0 pg    MCHC 32.1 " 32.0 - 36.0 g/dL    RDW 15.5 (H) 11.5 - 14.5 %    Platelets 270 150 - 450 K/uL    MPV 9.6 9.2 - 12.9 fL    Immature Granulocytes 0.5 0.0 - 0.5 %    Gran # (ANC) 7.8 (H) 1.8 - 7.7 K/uL    Immature Grans (Abs) 0.05 (H) 0.00 - 0.04 K/uL    Lymph # 1.4 1.0 - 4.8 K/uL    Mono # 0.7 0.3 - 1.0 K/uL    Eos # 1.1 (H) 0.0 - 0.5 K/uL    Baso # 0.05 0.00 - 0.20 K/uL    nRBC 0 0 /100 WBC    Gran % 70.1 38.0 - 73.0 %    Lymph % 12.7 (L) 18.0 - 48.0 %    Mono % 6.1 4.0 - 15.0 %    Eosinophil % 10.1 (H) 0.0 - 8.0 %    Basophil % 0.5 0.0 - 1.9 %    Differential Method Automated    COMPREHENSIVE METABOLIC PANEL   Result Value Ref Range    Sodium 143 136 - 145 mmol/L    Potassium 4.7 3.5 - 5.1 mmol/L    Chloride 104 95 - 110 mmol/L    CO2 30 (H) 23 - 29 mmol/L    Glucose 110 70 - 110 mg/dL    BUN 19 8 - 23 mg/dL    Creatinine 1.2 0.5 - 1.4 mg/dL    Calcium 9.9 8.7 - 10.5 mg/dL    Total Protein 7.6 6.0 - 8.4 g/dL    Albumin 3.8 3.5 - 5.2 g/dL    Total Bilirubin 0.5 0.1 - 1.0 mg/dL    Alkaline Phosphatase 96 55 - 135 U/L    AST 31 10 - 40 U/L    ALT 38 10 - 44 U/L    Anion Gap 9 8 - 16 mmol/L    eGFR 50.5 (A) >60 mL/min/1.73 m^2     *Note: Due to a large number of results and/or encounters for the requested time period, some results have not been displayed. A complete set of results can be found in Results Review.

## 2022-09-13 ENCOUNTER — PATIENT MESSAGE (OUTPATIENT)
Dept: CARDIOLOGY | Facility: CLINIC | Age: 62
End: 2022-09-13

## 2022-09-13 ENCOUNTER — PATIENT MESSAGE (OUTPATIENT)
Dept: TRANSPLANT | Facility: CLINIC | Age: 62
End: 2022-09-13
Payer: MEDICARE

## 2022-09-30 ENCOUNTER — TELEPHONE (OUTPATIENT)
Dept: PRIMARY CARE CLINIC | Facility: CLINIC | Age: 62
End: 2022-09-30

## 2022-09-30 ENCOUNTER — TELEPHONE (OUTPATIENT)
Dept: GASTROENTEROLOGY | Facility: CLINIC | Age: 62
End: 2022-09-30
Payer: MEDICARE

## 2022-09-30 NOTE — TELEPHONE ENCOUNTER
----- Message from Therese Nguyen sent at 9/30/2022 11:15 AM CDT -----  Contact: Pt  Type:  Khushi NARAYAN     Name of Caller:  Pt    Best Call Back Number:  [phone]    Additional Information:  Please call back.  Thanks.

## 2022-09-30 NOTE — TELEPHONE ENCOUNTER
----- Message from Anastacia Reginaldo sent at 9/30/2022 11:06 AM CDT -----  Contact: Patient  Type:  Needs Medical Advice    Who Called: Patient     Would the patient rather a call back or a response via [facility]? Call     Best Call Back Number: [phone] (home)      Additional Information: Patient stated that she was giving a phone number for sleep study and she has lost the phone number. Patient would like a call back to get number.    Please call to advise

## 2022-09-30 NOTE — TELEPHONE ENCOUNTER
Call placed to Ms. Silverman in regards to message received. No answer, left message to return call.     Per Dr. Bryson's office note on 9/7. EGD is deferred for now due to recent stent placement.

## 2022-10-03 ENCOUNTER — PATIENT MESSAGE (OUTPATIENT)
Dept: GASTROENTEROLOGY | Facility: CLINIC | Age: 62
End: 2022-10-03
Payer: MEDICARE

## 2022-10-20 ENCOUNTER — OFFICE VISIT (OUTPATIENT)
Dept: PRIMARY CARE CLINIC | Facility: CLINIC | Age: 62
End: 2022-10-20
Payer: MEDICARE

## 2022-10-20 VITALS
DIASTOLIC BLOOD PRESSURE: 80 MMHG | WEIGHT: 215.88 LBS | SYSTOLIC BLOOD PRESSURE: 130 MMHG | BODY MASS INDEX: 33.88 KG/M2 | HEART RATE: 63 BPM | HEIGHT: 67 IN

## 2022-10-20 DIAGNOSIS — I31.9 PERICARDITIS, UNSPECIFIED CHRONICITY, UNSPECIFIED TYPE: ICD-10-CM

## 2022-10-20 DIAGNOSIS — Z94.0 KIDNEY TRANSPLANT RECIPIENT: ICD-10-CM

## 2022-10-20 DIAGNOSIS — N18.30 HYPERTENSION ASSOCIATED WITH STAGE 3 CHRONIC KIDNEY DISEASE DUE TO TYPE 2 DIABETES MELLITUS: Primary | ICD-10-CM

## 2022-10-20 DIAGNOSIS — R53.83 FATIGUE, UNSPECIFIED TYPE: ICD-10-CM

## 2022-10-20 DIAGNOSIS — I12.9 HYPERTENSION ASSOCIATED WITH STAGE 3 CHRONIC KIDNEY DISEASE DUE TO TYPE 2 DIABETES MELLITUS: Primary | ICD-10-CM

## 2022-10-20 DIAGNOSIS — G44.319 ACUTE POST-TRAUMATIC HEADACHE, NOT INTRACTABLE: ICD-10-CM

## 2022-10-20 DIAGNOSIS — E11.8 CONTROLLED TYPE 2 DIABETES MELLITUS WITH COMPLICATION, WITH LONG-TERM CURRENT USE OF INSULIN: Chronic | ICD-10-CM

## 2022-10-20 DIAGNOSIS — E11.22 HYPERTENSION ASSOCIATED WITH STAGE 3 CHRONIC KIDNEY DISEASE DUE TO TYPE 2 DIABETES MELLITUS: Primary | ICD-10-CM

## 2022-10-20 DIAGNOSIS — Z79.4 CONTROLLED TYPE 2 DIABETES MELLITUS WITH COMPLICATION, WITH LONG-TERM CURRENT USE OF INSULIN: Chronic | ICD-10-CM

## 2022-10-20 PROCEDURE — 3075F SYST BP GE 130 - 139MM HG: CPT | Mod: CPTII,S$GLB,, | Performed by: FAMILY MEDICINE

## 2022-10-20 PROCEDURE — 3052F PR MOST RECENT HEMOGLOBIN A1C LEVEL 8.0 - < 9.0%: ICD-10-PCS | Mod: CPTII,S$GLB,, | Performed by: FAMILY MEDICINE

## 2022-10-20 PROCEDURE — 99999 PR PBB SHADOW E&M-EST. PATIENT-LVL V: CPT | Mod: PBBFAC,,, | Performed by: FAMILY MEDICINE

## 2022-10-20 PROCEDURE — 4010F PR ACE/ARB THEARPY RXD/TAKEN: ICD-10-PCS | Mod: CPTII,S$GLB,, | Performed by: FAMILY MEDICINE

## 2022-10-20 PROCEDURE — 99214 OFFICE O/P EST MOD 30 MIN: CPT | Mod: S$GLB,,, | Performed by: FAMILY MEDICINE

## 2022-10-20 PROCEDURE — 3079F PR MOST RECENT DIASTOLIC BLOOD PRESSURE 80-89 MM HG: ICD-10-PCS | Mod: CPTII,S$GLB,, | Performed by: FAMILY MEDICINE

## 2022-10-20 PROCEDURE — 1160F PR REVIEW ALL MEDS BY PRESCRIBER/CLIN PHARMACIST DOCUMENTED: ICD-10-PCS | Mod: CPTII,S$GLB,, | Performed by: FAMILY MEDICINE

## 2022-10-20 PROCEDURE — 1159F PR MEDICATION LIST DOCUMENTED IN MEDICAL RECORD: ICD-10-PCS | Mod: CPTII,S$GLB,, | Performed by: FAMILY MEDICINE

## 2022-10-20 PROCEDURE — 3052F HG A1C>EQUAL 8.0%<EQUAL 9.0%: CPT | Mod: CPTII,S$GLB,, | Performed by: FAMILY MEDICINE

## 2022-10-20 PROCEDURE — 99214 PR OFFICE/OUTPT VISIT, EST, LEVL IV, 30-39 MIN: ICD-10-PCS | Mod: S$GLB,,, | Performed by: FAMILY MEDICINE

## 2022-10-20 PROCEDURE — 4010F ACE/ARB THERAPY RXD/TAKEN: CPT | Mod: CPTII,S$GLB,, | Performed by: FAMILY MEDICINE

## 2022-10-20 PROCEDURE — 3066F NEPHROPATHY DOC TX: CPT | Mod: CPTII,S$GLB,, | Performed by: FAMILY MEDICINE

## 2022-10-20 PROCEDURE — 3079F DIAST BP 80-89 MM HG: CPT | Mod: CPTII,S$GLB,, | Performed by: FAMILY MEDICINE

## 2022-10-20 PROCEDURE — 99999 PR PBB SHADOW E&M-EST. PATIENT-LVL V: ICD-10-PCS | Mod: PBBFAC,,, | Performed by: FAMILY MEDICINE

## 2022-10-20 PROCEDURE — 1160F RVW MEDS BY RX/DR IN RCRD: CPT | Mod: CPTII,S$GLB,, | Performed by: FAMILY MEDICINE

## 2022-10-20 PROCEDURE — 3075F PR MOST RECENT SYSTOLIC BLOOD PRESS GE 130-139MM HG: ICD-10-PCS | Mod: CPTII,S$GLB,, | Performed by: FAMILY MEDICINE

## 2022-10-20 PROCEDURE — 3066F PR DOCUMENTATION OF TREATMENT FOR NEPHROPATHY: ICD-10-PCS | Mod: CPTII,S$GLB,, | Performed by: FAMILY MEDICINE

## 2022-10-20 PROCEDURE — 1159F MED LIST DOCD IN RCRD: CPT | Mod: CPTII,S$GLB,, | Performed by: FAMILY MEDICINE

## 2022-10-20 NOTE — PROGRESS NOTES
THIS DOCUMENT WAS MADE IN PART WITH VOICE RECOGNITION SOFTWARE.  OCCASIONALLY THIS SOFTWARE WILL MISINTERPRET WORDS OR PHRASES.      Primary Care Provider Appointment   [facility] ([facility])  [address], [place] [address]   Ph: [phone]  Fax: [phone]      Patient ID: Andra Newell is a 62 y.o. female.    ASSESSMENT/PLAN by Problem List:  Problem List Items Addressed This Visit       Kidney transplant recipient     She reports no changes, states her urine has been clear and voiding normally.  No hematuria and no flank pains there with change in symptoms will check labs including renal function         Controlled type 2 diabetes mellitus with complication, with long-term current use of insulin (Chronic)     Diabetes has been higher than average she does report some improvement but home average is still around 200.  She will continue to follow with Endocrinology and monitor closely         Hypertension associated with stage 3 chronic kidney disease due to type 2 diabetes mellitus - Primary (Chronic)    Relevant Orders    Comprehensive Metabolic Panel    CBC Auto Differential    CT Head Without Contrast    Pericarditis     No longer having any chest pain or discomfort, she was placed          Other Visit Diagnoses       Acute post-traumatic headache, not intractable        Relevant Orders    CT Head Without Contrast    Fatigue, unspecified type            Patient reports sudden onset of fatigue for about a week generalized malaise and new onset headaches.  She does recall hitting her head on the fireplace mantle right before this started maybe a day or so before.  She did lose consciousness and did not fall to the ground but did bump the left temporal region.  Because I do not have any other explanation for the new onset headaches and fatigue I do recommend imaging especially since she is on aspirin and Plavix.  Will also check labs.  She is with her  partner who will drive her over to [employer] to have this done today.    Follow Up:  2-3 months      Health Maintenance         Date Due Completion Date    Shingles Vaccine (1 of 2) Never done ---    COVID-19 Vaccine (3 - Booster for Pfizer series) 09/08/2021 7/14/2021    Eye Exam 11/18/2021 11/18/2020    Mammogram 08/05/2022 8/5/2021    Influenza Vaccine (1) 09/01/2022 1/31/2022    Override on 10/6/2020: Done (completed at [facility])    Diabetes Urine Screening 11/17/2022 11/17/2021    Hemoglobin A1c 02/28/2023 8/30/2022    Foot Exam 03/31/2023 3/31/2022    Override on 10/2/2019: Done    Lipid Panel 08/31/2023 8/31/2022    Cervical Cancer Screening 09/04/2023 9/4/2018    High Dose Statin 09/18/2023 9/18/2022    Colorectal Cancer Screening 05/09/2024 5/9/2019    TETANUS VACCINE 07/12/2027 7/12/2017          Subjective:     Chief Complaint   Patient presents with    Follow-up    Diabetes    Back Pain    Spasms    Shaking     I have reviewed the information entered by the ancillary staff regarding the chief complaint as well as the related history.    HPI    Patient is a/an 62 y.o.  female   RISK of ADMIT/ED: 45    Felling badly x 1 week, fatigue, weakness, malaise, no fever    Headaches last week, posterior x 3 days  Tylenol helps  Did hit head on fire place mantel    Breathing stable  Muscle spasm back of left leg    BP at home 120-130/70-80  Glucose has improved, still spikes evening after eating  Avg 200,     For complete problem list, past medical history, surgical history, social history, etc., see appropriate section in the electronic medical record    Review of Systems   Constitutional:  Positive for activity change and fatigue. Negative for fever and unexpected weight change.   Respiratory:  Negative for shortness of breath.    Cardiovascular:  Negative for chest pain and leg swelling.   Musculoskeletal:  Positive for arthralgias and gait problem.   Neurological:  Positive for tremors, weakness and  "headaches. Negative for syncope and speech difficulty.     Objective     Physical Exam  HENT:      Head: Normocephalic and atraumatic.      Comments: No visible trauma or bruising     Right Ear: Tympanic membrane, ear canal and external ear normal. There is no impacted cerumen.      Left Ear: Tympanic membrane, ear canal and external ear normal. There is no impacted cerumen.      Mouth/Throat:      Mouth: Mucous membranes are moist.      Pharynx: No oropharyngeal exudate or posterior oropharyngeal erythema.   Eyes:      General: No scleral icterus.        Right eye: No discharge.         Left eye: No discharge.      Extraocular Movements: Extraocular movements intact.      Conjunctiva/sclera: Conjunctivae normal.      Pupils: Pupils are equal, round, and reactive to light.   Cardiovascular:      Rate and Rhythm: Normal rate and regular rhythm.      Heart sounds: Murmur heard.   Systolic murmur is present with a grade of 2/6.     No friction rub.      Comments: There is no peripheral edema.  Pulmonary:      Effort: Pulmonary effort is normal. No respiratory distress.      Breath sounds: Normal breath sounds. No wheezing or rales.   Neurological:      General: No focal deficit present.      Cranial Nerves: No cranial nerve deficit.      Gait: Gait abnormal.      Deep Tendon Reflexes: Reflexes normal.      Comments: Ambulatory, antalgic gait.  She does have bilateral hand tremor, short amplitude rapid frequency.  I do not see any focal deficits no cranial nerve abnormalities   Psychiatric:         Mood and Affect: Mood normal.     Vitals:    10/20/22 1521 10/20/22 1636   BP: (!) 155/80 130/80   BP Location: Right arm    Patient Position: Sitting    BP Method: Medium (Manual)    Pulse: 63    Weight: 97.9 kg (215 lb 13.6 oz)    Height: 5' 7" (1.702 m)        RECENT LABS:    Lab Results   Component Value Date    WBC 8.22 09/13/2022    HGB 10.4 (L) 09/13/2022    HCT 32.9 (L) 09/13/2022     09/13/2022    CHOL 156 " 08/31/2022    TRIG 198 (H) 08/31/2022    HDL 20 (L) 08/31/2022    ALT 38 (H) 09/13/2022    AST 44 (H) 09/13/2022     09/13/2022    K 4.2 09/13/2022     09/13/2022    CREATININE 0.89 09/13/2022    BUN 14 09/13/2022    CO2 29 09/13/2022    TSH 0.934 05/19/2022    INR 1.1 09/13/2022    HGBA1C 8.3 (H) 08/30/2022       Results for orders placed or performed during the hospital encounter of 09/13/22   CBC auto differential   Result Value Ref Range    WBC 8.22 3.90 - 12.70 K/uL    RBC 3.78 (L) 4.00 - 5.40 M/uL    Hemoglobin 10.4 (L) 12.0 - 16.0 g/dL    Hematocrit 32.9 (L) 37.0 - 48.5 %    MCV 87 82 - 98 fL    MCH 27.5 27.0 - 31.0 pg    MCHC 31.6 (L) 32.0 - 36.0 g/dL    RDW 15.1 (H) 11.5 - 14.5 %    Platelets 228 150 - 450 K/uL    MPV 9.4 9.2 - 12.9 fL    Immature Granulocytes 0.5 0.0 - 0.5 %    Gran # (ANC) 5.7 1.8 - 7.7 K/uL    Immature Grans (Abs) 0.04 0.00 - 0.04 K/uL    Lymph # 1.0 1.0 - 4.8 K/uL    Mono # 0.5 0.3 - 1.0 K/uL    Eos # 1.0 (H) 0.0 - 0.5 K/uL    Baso # 0.03 0.00 - 0.20 K/uL    nRBC 0 0 /100 WBC    Gran % 69.5 38.0 - 73.0 %    Lymph % 12.4 (L) 18.0 - 48.0 %    Mono % 5.6 4.0 - 15.0 %    Eosinophil % 11.6 (H) 0.0 - 8.0 %    Basophil % 0.4 0.0 - 1.9 %    Differential Method Automated    Comprehensive metabolic panel (CMP)   Result Value Ref Range    Sodium 143 136 - 145 mmol/L    Potassium 4.2 3.5 - 5.1 mmol/L    Chloride 103 95 - 110 mmol/L    CO2 29 22 - 31 mmol/L    Glucose 153 (H) 70 - 110 mg/dL    BUN 14 7 - 18 mg/dL    Creatinine 0.89 0.50 - 1.40 mg/dL    Calcium 8.8 8.4 - 10.2 mg/dL    Total Protein 7.2 6.0 - 8.4 g/dL    Albumin 4.1 3.5 - 5.2 g/dL    Total Bilirubin 0.5 0.2 - 1.3 mg/dL    Alkaline Phosphatase 96 38 - 145 U/L    AST 44 (H) 14 - 36 U/L    ALT 38 (H) 0 - 35 U/L    Anion Gap 11 8 - 16 mmol/L    eGFR >60 >60 mL/min/1.73 m^2   Protime-INR   Result Value Ref Range    PT 14.2 11.8 - 14.7 sec    INR 1.1    APTT   Result Value Ref Range    aPTT 33.4 24.6 - 36.7 sec   Troponin    Result Value Ref Range    Troponin I <0.012 0.012 - 0.034 ng/mL   NT-Pro Natriuretic Peptide   Result Value Ref Range    NT-proBNP 467 5 - 900 pg/mL   Magnesium   Result Value Ref Range    Magnesium 1.4 (L) 1.6 - 2.6 mg/dL   Troponin in 2 Hours   Result Value Ref Range    Troponin I <0.012 0.012 - 0.034 ng/mL   Sedimentation rate   Result Value Ref Range    Sed Rate 38 (H) 0 - 29 mm/Hr   C-Reactive Protein   Result Value Ref Range    CRP 1.00 (H) 0.00 - 0.90 mg/dL     *Note: Due to a large number of results and/or encounters for the requested time period, some results have not been displayed. A complete set of results can be found in Results Review.

## 2022-10-20 NOTE — ASSESSMENT & PLAN NOTE
She reports no changes, states her urine has been clear and voiding normally.  No hematuria and no flank pains there with change in symptoms will check labs including renal function

## 2022-10-20 NOTE — ASSESSMENT & PLAN NOTE
Diabetes has been higher than average she does report some improvement but home average is still around 200.  She will continue to follow with Endocrinology and monitor closely

## 2022-10-21 NOTE — ASSESSMENT & PLAN NOTE
- [facility] labs significant for anemia (H/H 6.9/21.0).   - Pt has hx chronic anemia (previously likely 2/2 ESRD, but now that renal fx recovered, unclear etiology - possibly due to Parvovirus)  - Iron studies, folate, B12, LDH, retic, and haptoglobin reviewed.    - Received 2 units PRBC on 5/7 and H&H responded appropriately  - Hematology on board, appreciate their assistance   Lumbar Transforaminal Epidural Steroid Injection under Fluoroscopic Guidance    The procedure, risks, benefits, and options were discussed with the patient. There are no contraindications to the procedure. The patent expressed understanding and agreed to the procedure. Informed written consent was obtained prior to the start of the procedure and can be found in the patient's chart.    PATIENT NAME: Jo-Ann Escobedo   MRN: [id number]     DATE OF PROCEDURE: 10/21/2022    PROCEDURE:  Bilateral  L3/4 Lumbar Transforaminal Epidural Steroid Injection under Fluoroscopic Guidance    PRE-OP DIAGNOSIS: Lumbar radiculopathy [M54.16] Lumbar radiculopathy [M54.16]    POST-OP DIAGNOSIS: Same    PHYSICIAN: Julian Fish MD    ASSISTANTS: Katiuska Rosario MD     MEDICATIONS INJECTED: Preservative-free Decadron 10mg with 5cc of Lidocaine 1% MPF     LOCAL ANESTHETIC INJECTED: Xylocaine 2%     SEDATION: Versed 2mg and Fentanyl 50mcg                                                                                                                                                                                     Conscious sedation ordered by M.D. Patient re-evaluation prior to administration of conscious sedation. No changes noted in patient's status from initial evaluation. The patient's vital signs were monitored by RN and patient remained hemodynamically stable throughout the procedure.    Event Time In   Sedation Start 1405   Sedation End 1414       ESTIMATED BLOOD LOSS: None    COMPLICATIONS: None    TECHNIQUE: Time-out was performed to identify the patient and procedure to be performed. With the patient laying in a prone position, the surgical area was prepped and draped in the usual sterile fashion using ChloraPrep and a fenestrated drape.The levels were determined under fluoroscopy guidance. Skin anesthesia was achieved by injecting Lidocaine 2% over the injection sites. The transforaminal spaces were then approached with a 25 gauge,  3.5 inch spinal quinke needle that was introduced under fluoroscopic guidance in the AP and Lateral views. Once the needle tip was in the area of the transforaminal space, and there was no blood, CSF or paraesthesias, contrast dye Omnipaque (240mg/mL) was injected to confirm placement and there was no vascular runoff. Fluoroscopic imaging in the AP and lateral views revealed a clear outline of the spinal nerve with proximal spread of agent through the neural foramen into the epidural space. 3 mL of the medication mixture listed above was injected slowly at each site. Displacement of the radio opaque contrast after injection of the medication confirmed that the medication went into the area of the transforaminal spaces. The needles were removed and bleeding was nil. A sterile dressing was applied. No specimens collected. The patient tolerated the procedure well.       The patient was monitored after the procedure in the recovery area. They were given post-procedure and discharge instructions to follow at home. The patient was discharged in a stable condition.        Julian Fish MD

## 2022-10-28 DIAGNOSIS — E11.69 MIXED DIABETIC HYPERLIPIDEMIA ASSOCIATED WITH TYPE 2 DIABETES MELLITUS: ICD-10-CM

## 2022-10-28 DIAGNOSIS — E78.2 MIXED DIABETIC HYPERLIPIDEMIA ASSOCIATED WITH TYPE 2 DIABETES MELLITUS: ICD-10-CM

## 2022-10-28 RX ORDER — ROSUVASTATIN CALCIUM 20 MG/1
20 TABLET, COATED ORAL NIGHTLY
Qty: 30 TABLET | Refills: 1 | Status: SHIPPED | OUTPATIENT
Start: 2022-10-28 | End: 2022-12-05 | Stop reason: SDUPTHER

## 2022-10-28 RX ORDER — ISOSORBIDE MONONITRATE 30 MG/1
30 TABLET, EXTENDED RELEASE ORAL DAILY
Qty: 30 TABLET | Refills: 1 | Status: SHIPPED | OUTPATIENT
Start: 2022-10-28 | End: 2023-01-03

## 2022-10-28 RX ORDER — CLOPIDOGREL BISULFATE 75 MG/1
75 TABLET ORAL DAILY
Qty: 30 TABLET | Refills: 1 | Status: SHIPPED | OUTPATIENT
Start: 2022-10-28 | End: 2022-12-12 | Stop reason: SDUPTHER

## 2022-10-28 NOTE — TELEPHONE ENCOUNTER
----- Message from Charity Bernard sent at 10/28/2022 10:37 AM CDT -----  Regarding: prescription refill  [phone] - call back ; needs prescription for Plavix ( she had 2 left) ; imdur ; crestor; send to [facility] in [place].    Thanks    Charity

## 2022-11-03 ENCOUNTER — PATIENT MESSAGE (OUTPATIENT)
Dept: TRANSPLANT | Facility: CLINIC | Age: 62
End: 2022-11-03
Payer: MEDICARE

## 2022-11-04 ENCOUNTER — PATIENT MESSAGE (OUTPATIENT)
Dept: PRIMARY CARE CLINIC | Facility: CLINIC | Age: 62
End: 2022-11-04
Payer: MEDICARE

## 2022-11-04 DIAGNOSIS — M25.572 LEFT ANKLE PAIN, UNSPECIFIED CHRONICITY: Primary | ICD-10-CM

## 2022-11-04 NOTE — TELEPHONE ENCOUNTER
Called and spoke with patient. Labs and imaging scheduled. Pt states she would rather a referral to ortho instead of podiatry.

## 2022-11-04 NOTE — TELEPHONE ENCOUNTER
Regarding my chart message,  I entered a referral to podiatry as well as an x-ray.  Because she is diabetic I do want her to get the x-ray as soon as possible just to make sure there is not an occult fracture.  She may also want to consider getting a uric acid level.

## 2022-11-07 ENCOUNTER — HOSPITAL ENCOUNTER (OUTPATIENT)
Dept: RADIOLOGY | Facility: HOSPITAL | Age: 62
Discharge: HOME | End: 2022-11-07
Attending: FAMILY MEDICINE
Payer: MEDICARE

## 2022-11-07 DIAGNOSIS — M25.572 LEFT ANKLE PAIN, UNSPECIFIED CHRONICITY: ICD-10-CM

## 2022-11-07 PROCEDURE — 73610 XR ANKLE COMPLETE 3 VIEW LEFT: ICD-10-PCS | Mod: 26,LT,, | Performed by: RADIOLOGY

## 2022-11-07 PROCEDURE — 73610 X-RAY EXAM OF ANKLE: CPT | Mod: TC,FY,PO,LT

## 2022-11-07 PROCEDURE — 73610 X-RAY EXAM OF ANKLE: CPT | Mod: 26,LT,, | Performed by: RADIOLOGY

## 2022-11-10 ENCOUNTER — TELEPHONE (OUTPATIENT)
Dept: NEUROLOGY | Facility: CLINIC | Age: 62
End: 2022-11-10
Payer: MEDICARE

## 2022-11-10 NOTE — TELEPHONE ENCOUNTER
Informed patient that Dr. Neri doesn't have anything available till 2023, patient states that she will call back once schedule is open

## 2022-11-10 NOTE — TELEPHONE ENCOUNTER
----- Message from Reginaldo Aleman sent at 11/10/2022 12:41 PM CST -----  Regarding: sooner appt  Type:  Sooner Appointment Request    Caller is requesting a sooner appointment.    Name of Caller:  Andra    When is the first available appointment?  No solution found before the template release date of 2/4/2023.    Symptoms:  worsening condition // muscular  (from last visit)    Best Call Back Number:  [phone]     Additional Information:         Advanced care planning    AMD completed today, DDNR signed. Goals are to go to rehab.        Primary Decision Maker: Enmanuel Hurt) - Grandchild - [phone]    Secondary Decision Maker: Garr Noon ([facility]) - Other Relative - [phone]

## 2022-11-15 ENCOUNTER — TELEPHONE (OUTPATIENT)
Dept: ORTHOPEDICS | Facility: CLINIC | Age: 62
End: 2022-11-15
Payer: MEDICARE

## 2022-11-16 ENCOUNTER — PATIENT MESSAGE (OUTPATIENT)
Dept: PRIMARY CARE CLINIC | Facility: CLINIC | Age: 62
End: 2022-11-16
Payer: MEDICARE

## 2022-11-18 ENCOUNTER — PATIENT MESSAGE (OUTPATIENT)
Dept: NEUROLOGY | Facility: CLINIC | Age: 62
End: 2022-11-18
Payer: MEDICARE

## 2022-11-18 ENCOUNTER — TELEPHONE (OUTPATIENT)
Dept: NEUROLOGY | Facility: CLINIC | Age: 62
End: 2022-11-18
Payer: MEDICARE

## 2022-11-18 NOTE — TELEPHONE ENCOUNTER
Spoke with the pt, reports she has developed a tremor to her upper extremities over the past 3 months, concentrated to the LUE. Pt has yet to complete EMG ordered by Dr Neri, no openings at this time. Pt will be contacted with an appt in the event of a cancellation before January, EMG scheduled for Jan 9th. The pt was scheduled with Darby Chen NP for new problem (tremor) Jan 24th. Pt v/u.

## 2022-11-22 DIAGNOSIS — Z79.4 TYPE 2 DIABETES MELLITUS WITH OTHER SPECIFIED COMPLICATION, WITH LONG-TERM CURRENT USE OF INSULIN: ICD-10-CM

## 2022-11-22 DIAGNOSIS — E11.69 TYPE 2 DIABETES MELLITUS WITH OTHER SPECIFIED COMPLICATION, WITH LONG-TERM CURRENT USE OF INSULIN: ICD-10-CM

## 2022-11-23 DIAGNOSIS — M79.672 LEFT FOOT PAIN: Primary | ICD-10-CM

## 2022-11-23 RX ORDER — INSULIN DEGLUDEC 200 U/ML
80 INJECTION, SOLUTION SUBCUTANEOUS DAILY
Qty: 12 PEN | Refills: 3 | Status: SHIPPED | OUTPATIENT
Start: 2022-11-23 | End: 2022-12-05 | Stop reason: SDUPTHER

## 2022-11-23 RX ORDER — INSULIN LISPRO 100 [IU]/ML
INJECTION, SOLUTION INTRAVENOUS; SUBCUTANEOUS
Qty: 18 ML | Refills: 11 | Status: SHIPPED | OUTPATIENT
Start: 2022-11-23 | End: 2022-11-30 | Stop reason: CLARIF

## 2022-11-27 DIAGNOSIS — Z79.4 TYPE 2 DIABETES MELLITUS WITH OTHER SPECIFIED COMPLICATION, WITH LONG-TERM CURRENT USE OF INSULIN: ICD-10-CM

## 2022-11-27 DIAGNOSIS — E11.69 TYPE 2 DIABETES MELLITUS WITH OTHER SPECIFIED COMPLICATION, WITH LONG-TERM CURRENT USE OF INSULIN: ICD-10-CM

## 2022-11-27 RX ORDER — INSULIN DEGLUDEC 200 U/ML
80 INJECTION, SOLUTION SUBCUTANEOUS DAILY
Qty: 12 PEN | Refills: 3 | Status: CANCELLED | OUTPATIENT
Start: 2022-11-27 | End: 2023-11-27

## 2022-11-29 ENCOUNTER — OFFICE VISIT (OUTPATIENT)
Dept: ORTHOPEDICS | Facility: CLINIC | Age: 62
End: 2022-11-29
Payer: MEDICARE

## 2022-11-29 ENCOUNTER — HOSPITAL ENCOUNTER (OUTPATIENT)
Dept: RADIOLOGY | Facility: HOSPITAL | Age: 62
Discharge: HOME | End: 2022-11-29
Attending: ORTHOPAEDIC SURGERY
Payer: MEDICARE

## 2022-11-29 DIAGNOSIS — M79.672 LEFT FOOT PAIN: ICD-10-CM

## 2022-11-29 DIAGNOSIS — M25.572 LEFT ANKLE PAIN, UNSPECIFIED CHRONICITY: ICD-10-CM

## 2022-11-29 DIAGNOSIS — M76.72 PERONEAL TENDINITIS OF LEFT LOWER LEG: Primary | ICD-10-CM

## 2022-11-29 PROCEDURE — 1159F PR MEDICATION LIST DOCUMENTED IN MEDICAL RECORD: ICD-10-PCS | Mod: CPTII,S$GLB,, | Performed by: ORTHOPAEDIC SURGERY

## 2022-11-29 PROCEDURE — 3066F NEPHROPATHY DOC TX: CPT | Mod: CPTII,S$GLB,, | Performed by: ORTHOPAEDIC SURGERY

## 2022-11-29 PROCEDURE — 4010F PR ACE/ARB THEARPY RXD/TAKEN: ICD-10-PCS | Mod: CPTII,S$GLB,, | Performed by: ORTHOPAEDIC SURGERY

## 2022-11-29 PROCEDURE — 3052F HG A1C>EQUAL 8.0%<EQUAL 9.0%: CPT | Mod: CPTII,S$GLB,, | Performed by: ORTHOPAEDIC SURGERY

## 2022-11-29 PROCEDURE — 3052F PR MOST RECENT HEMOGLOBIN A1C LEVEL 8.0 - < 9.0%: ICD-10-PCS | Mod: CPTII,S$GLB,, | Performed by: ORTHOPAEDIC SURGERY

## 2022-11-29 PROCEDURE — 1159F MED LIST DOCD IN RCRD: CPT | Mod: CPTII,S$GLB,, | Performed by: ORTHOPAEDIC SURGERY

## 2022-11-29 PROCEDURE — 4010F ACE/ARB THERAPY RXD/TAKEN: CPT | Mod: CPTII,S$GLB,, | Performed by: ORTHOPAEDIC SURGERY

## 2022-11-29 PROCEDURE — 99999 PR PBB SHADOW E&M-EST. PATIENT-LVL I: CPT | Mod: PBBFAC,,, | Performed by: ORTHOPAEDIC SURGERY

## 2022-11-29 PROCEDURE — 99999 PR PBB SHADOW E&M-EST. PATIENT-LVL I: ICD-10-PCS | Mod: PBBFAC,,, | Performed by: ORTHOPAEDIC SURGERY

## 2022-11-29 PROCEDURE — 1160F PR REVIEW ALL MEDS BY PRESCRIBER/CLIN PHARMACIST DOCUMENTED: ICD-10-PCS | Mod: CPTII,S$GLB,, | Performed by: ORTHOPAEDIC SURGERY

## 2022-11-29 PROCEDURE — 73630 X-RAY EXAM OF FOOT: CPT | Mod: 26,LT,, | Performed by: RADIOLOGY

## 2022-11-29 PROCEDURE — 3066F PR DOCUMENTATION OF TREATMENT FOR NEPHROPATHY: ICD-10-PCS | Mod: CPTII,S$GLB,, | Performed by: ORTHOPAEDIC SURGERY

## 2022-11-29 PROCEDURE — 99203 PR OFFICE/OUTPT VISIT, NEW, LEVL III, 30-44 MIN: ICD-10-PCS | Mod: S$GLB,,, | Performed by: ORTHOPAEDIC SURGERY

## 2022-11-29 PROCEDURE — 1160F RVW MEDS BY RX/DR IN RCRD: CPT | Mod: CPTII,S$GLB,, | Performed by: ORTHOPAEDIC SURGERY

## 2022-11-29 PROCEDURE — 73630 XR FOOT COMPLETE 3 VIEW LEFT: ICD-10-PCS | Mod: 26,LT,, | Performed by: RADIOLOGY

## 2022-11-29 PROCEDURE — 73630 X-RAY EXAM OF FOOT: CPT | Mod: TC,PO,LT

## 2022-11-29 PROCEDURE — 99203 OFFICE O/P NEW LOW 30 MIN: CPT | Mod: S$GLB,,, | Performed by: ORTHOPAEDIC SURGERY

## 2022-11-29 NOTE — PROGRESS NOTES
Status/Diagnosis: Left peroneal tendinitis  Date of Surgery: Left achilles tendon surgery x 3 (rerupture then MRSA)  Date of Injury: none  Return visit: 3 weeks   X-rays on Return: none    Chief Complaint: Left ankle pain    Present History:  Andra Newell is a 62 y.o. female who presents today via referral from Dr. Fady Vaughn.  Patient was somewhat complex past surgical history.  Patient underwent what sounds like Woody resection and Achilles insertional repair by Dr. Villatoro in 2016.  Acutely postop, patient accidentally placed weight on the affected extremity causing rerupture.  Underwent revision Achilles tendon repair and shortly thereafter developed infection the patient describes as MRSA that requires superficial debridement.  Since then surgical site healed uneventfully.  Patient endorses pain today along the lateral/posterolateral ankle and hindfoot.  Four year history of pain with no new injury.  Also with complaints of instability and giving way.  Unable to take oral NSAIDs due to history of renal transplant.  Denies tobacco use.  Currently on Plavix for CAD. Type 2 DM, A1c of 7.3.      Past Medical History:   Diagnosis Date    Anemia     Anemia in chronic kidney disease, on chronic dialysis 7/12/2017    Anxiety and depression     Aplastic anemia with parvovirus B19 infection 5/27/2019    Arthritis     Asthma     allergic airway    CKD stage III (moderate) 2/18/2019    Colon polyp     Depression     Diabetes mellitus     Diverticulosis     Encounter for blood transfusion     Eosinophilia     ESRD (end stage renal disease)     stage V, due for living donor 9/2018    ESRD on dialysis     GERD (gastroesophageal reflux disease)     Gout     Gout     Hyperlipidemia     Hypertension     Hypertriglyceridemia without hypercholesterolemia 6/9/2019    Low back pain     Low HDL (under 40) 6/9/2019    MRSA carrier     Neutropenia, unspecified 5/8/2019    Obesity     Renal disorder     Rotator cuff  "syndrome--left     Thyroid disease     Ulnar neuropathy of right upper extremity     Uncontrolled type 2 diabetes mellitus with hyperglycemia        Past Surgical History:   Procedure Laterality Date    ACHILLES TENDON SURGERY Left May 2015    BACK SURGERY      CERVICAL SPINE SURGERY  2021    cervical gate placed by Dr. Vera- asim shaw, and "gate"    CHOLECYSTECTOMY      COLONOSCOPY N/A 9/6/2017    Procedure: COLONOSCOPY;  Surgeon: Marisol Bryson MD;  Location: [facility] ENDO;  Service: Endoscopy;  Laterality: N/A; REPEAT IN 3 YEARS FOR SURVEILLANCE    COLONOSCOPY N/A 5/9/2019    Procedure: COLONOSCOPY;  Surgeon: Fady Ewing MD;  Location: [facility] ENDO (2ND FLR);  Service: Endoscopy;  Laterality: N/A;    CORONARY ANGIOGRAPHY  8/31/2022    Procedure: ANGIOGRAM, CORONARY ARTERY;  Surgeon: Christoph Dang MD;  Location: [facility];  Service: Cardiology;;    DIALYSIS FISTULA CREATION  12/2017    ESOPHAGOGASTRODUODENOSCOPY N/A 5/9/2019    Procedure: EGD (ESOPHAGOGASTRODUODENOSCOPY);  Surgeon: Fady Ewing MD;  Location: [facility] ENDO (2ND FLR);  Service: Endoscopy;  Laterality: N/A;    ESOPHAGOGASTRODUODENOSCOPY N/A 6/10/2021    Procedure: EGD (ESOPHAGOGASTRODUODENOSCOPY);  Surgeon: Marisol Bryson MD;  Location: [facility] ENDO;  Service: Endoscopy;  Laterality: N/A;    HEMORRHOID SURGERY      INJECTION OF ANESTHETIC AGENT AROUND MEDIAL BRANCH NERVES INNERVATING LUMBAR FACET JOINT Right 9/25/2019    Procedure: Block-nerve-medial branch-lumbar;  Surgeon: Yonny Ferrer MD;  Location: [facility] OR;  Service: Pain Management;  Laterality: Right;  L2, 3, 4,5     INJECTION OF ANESTHETIC AGENT AROUND MEDIAL BRANCH NERVES INNERVATING LUMBAR FACET JOINT Right 10/16/2019    Procedure: Block-nerve-medial branch-lumbar;  Surgeon: Yonny Ferrer MD;  Location: [facility] OR;  Service: Pain Management;  Laterality: Right;  L2, 3, 4,5     JOINT REPLACEMENT      left    KIDNEY TRANSPLANT N/A 1/14/2019    Procedure: TRANSPLANT, KIDNEY;  Surgeon: Roland" ODILIA Salazar MD;  Location: [facility] OR 2ND FLR;  Service: Transplant;  Laterality: N/A;    KNEE SURGERY      RADIOFREQUENCY ABLATION OF LUMBAR MEDIAL BRANCH NERVE AT SINGLE LEVEL Right 10/29/2019    Procedure: Radiofrequency Ablation, Nerve, Spinal, Lumbar, Medial Branch, 1 Level;  Surgeon: Yonny Ferrer MD;  Location: [facility] OR;  Service: Pain Management;  Laterality: Right;  L2, 3, 4, 5  burned at 80 degrees C X 60 seconds each site X 2    SHOULDER ARTHROSCOPY      SHOULDER SURGERY      UPPER GASTROINTESTINAL ENDOSCOPY  ~2013     [facility]       Current Outpatient Medications   Medication Sig    acetaminophen (TYLENOL) 325 MG tablet Take 2 tablets (650 mg total) by mouth every 8 (eight) hours as needed for Pain.    aspirin (ECOTRIN) 81 MG EC tablet Take 1 tablet (81 mg total) by mouth once daily.    clopidogreL (PLAVIX) 75 mg tablet Take 1 tablet (75 mg total) by mouth once daily.    colchicine (COLCRYS) 0.6 mg tablet Take 1 tablet (0.6 mg total) by mouth 2 (two) times daily.    famotidine (PEPCID) 40 MG tablet Take 1 tablet (40 mg total) by mouth every evening.    flash glucose sensor (FREESTYLE DAKOTAH 14 DAY SENSOR) Kit Use 1 sensor every 14 days to check blood glucose    insulin aspart U-100 (NOVOLOG FLEXPEN U-100 INSULIN) 100 unit/mL (3 mL) InPn pen Pt is taking 32 units with meals plus ss for a max dose of 150 units a day.    insulin degludec (TRESIBA FLEXTOUCH U-200) 200 unit/mL (3 mL) insulin pen Inject 80 Units into the skin once daily.    insulin lispro (HUMALOG KWIKPEN INSULIN) 100 unit/mL pen Pt is taking 32 units with meals plus ss for a max dose of 150 units a day.    isosorbide mononitrate (IMDUR) 30 MG 24 hr tablet Take 1 tablet (30 mg total) by mouth once daily.    levothyroxine (SYNTHROID) 75 MCG tablet TAKE ONE TABLET BY MOUTH ONCE DAILY    LORazepam (ATIVAN) 1 MG tablet Take 1 mg by mouth every evening.    losartan (COZAAR) 50 MG tablet Take 1 tablet (50 mg total) by mouth once daily.    magnesium oxide  500 mg Tab Take 500 mg by mouth once daily.    metFORMIN (GLUCOPHAGE-XR) 500 MG ER 24hr tablet TAKE ONE TABLET BY MOUTH TWICE DAILY WITH FOOD    metoprolol tartrate (LOPRESSOR) 25 MG tablet Take 0.5 tablets (12.5 mg total) by mouth 2 (two) times daily.    multivitamin (THERAGRAN) tablet Take 1 tablet by mouth once daily.    nitroGLYCERIN (NITROSTAT) 0.4 MG SL tablet Place 1 tablet (0.4 mg total) under the tongue every 5 (five) minutes as needed for Chest pain.    omeprazole (PRILOSEC) 40 MG capsule Take 1 capsule (40 mg total) by mouth 2 (two) times daily before meals for 14 days, THEN 1 capsule (40 mg total) every morning.    rosuvastatin (CRESTOR) 20 MG tablet Take 1 tablet (20 mg total) by mouth every evening.    sucralfate (CARAFATE) 1 gram tablet Take 1 tablet (1 g total) by mouth every 6 (six) hours as needed (GERD).    tacrolimus (PROGRAF) 1 MG Cap Take 1 capsule (1 mg total) by mouth every 12 (twelve) hours.    tiZANidine (ZANAFLEX) 4 MG tablet Take 1 tablet by mouth every evening.    vilazodone (VIIBRYD) 40 mg Tab tablet Take 1 tablet (40 mg total) by mouth once daily.     No current facility-administered medications for this visit.       Review of patient's allergies indicates:   Allergen Reactions    Dilaudid [hydromorphone] Itching    Morphine Itching    Torsemide Diarrhea     Diarrhea stopped once discontinued med    Codeine Itching     Can take oxycodone and hydrocodone with Benadryl       Family History   Problem Relation Age of Onset    Diabetes Mother     Alzheimer's disease Mother     Thyroid disease Mother     Hyperlipidemia Mother     Heart disease Father     Stroke Father     Diabetes Sister     Lupus Sister     Ovarian cancer Sister     Heart disease Brother     No Known Problems Son     Diabetes Maternal Grandmother     Hypertension Maternal Grandmother     No Known Problems Paternal Grandmother     No Known Problems Paternal Grandfather     COPD Maternal Aunt     Diabetes Maternal Aunt      Heart disease Maternal Aunt     Hypertension Maternal Aunt     No Known Problems Maternal Uncle     No Known Problems Paternal Aunt     No Known Problems Paternal Uncle     Colon cancer Neg Hx     Colon polyps Neg Hx     Crohn's disease Neg Hx     Ulcerative colitis Neg Hx     Celiac disease Neg Hx        Social History     Socioeconomic History    Marital status: Significant Other     Spouse name: Any Alvarez    Number of children: 2    Highest education level: 12th grade   Occupational History    Occupation: retired     Comment:    Tobacco Use    Smoking status: Never    Smokeless tobacco: Never   Substance and Sexual Activity    Alcohol use: No     Alcohol/week: 0.0 standard drinks    Drug use: No    Sexual activity: Yes     Partners: Female   Social History Narrative     female    Disabled since 2015 due to DDD and severe osteoarthritis of knee and hips    Previously worked as  at [employer]    Lives in residential 1 story home with partner, Any.     Has 2 boys, 1 lives in [place], 1 in [place].         Judaism: Scientologist    Hobbies: painting and crafts.         Physical exam:  There were no vitals filed for this visit.  There is no height or weight on file to calculate BMI.  General: In no apparent distress; well developed and well nourished.  HEENT: normocephalic; atraumatic.  Cardiovascular: regular rate.  Respiratory: no increased work of breathing.  Musculoskeletal:   Gait: antalgic  Inspection:  Previous surgical scar about the left Achilles is well healed.  No residual signs/symptoms of infection.  Physiologic hindfoot valgus on exam.  Patient localizes tenderness along the posterolateral ankle.  Exquisite tenderness to palpation along the course of the peroneals.  Moderate pain with resisted foot eversion.  Equivocal testing with anterior drawer and varus talar tilt.  With the patient prominent and knees flexed to 90 the ankle is at neutral  dorsiflexion versus 15° plantar flexion on the right.  Good tension with Zafar testing.  Silfverskiold: negative  Alignment:  Knee: neutral               Ankle: neutral              Hindfoot: neutral              Forefoot: neutral   Strength:              Dorsiflexion 5/5  Plantar flexion 5/5  Inversion 5/5   Eversion 4/5 with pain  Sensation:              Good sensation on monofilament testing   ROM:              Ankle: Full and painless.              Subtalar: Painless inversion and eversion   Pulses: 2+ DP/PT pulses.                   Imaging Studies/Outside documentation:  I have ordered/reviewed/interpreted the following images/outside documentation:  1. Weight bearing 3-views of Left foot and ankle:  No acute fracture noted.  Evidence of previous Woody resection.  Moderate increased calcaneal pitch.  Some degree of talonavicular over coverage. Increased Meary's angle.    Positive metatarsal stacking.  Diffuse osteopenic changes throughout the foot and ankle.  Congruent ankle mortise.        Assessment:  Andra Newell is a 62 y.o. female with Left peroneal tendinitis.     Plan:   Clinical and radiographic findings were discussed.  Pain does not seem to be related to previous Achilles tendon surgeries.  While no evidence of cavovarus, patient with exquisite pain along the course of the peroneal tendons.  Recommend a short course of boot immobilization, provided today.  Patient unable to take oral NSAIDs as outlined in HPI this will initiate topical Voltaren gel.  Patient voiced understanding.  All questions were answered.  Return to clinic in 3 weeks for repeat evaluation.  Likely initiate physical therapy versus MRI at that time.    This note was created using voice recognition software and may contain grammatical errors.

## 2022-11-30 ENCOUNTER — TELEPHONE (OUTPATIENT)
Dept: ENDOCRINOLOGY | Facility: CLINIC | Age: 62
End: 2022-11-30
Payer: MEDICARE

## 2022-11-30 RX ORDER — INSULIN ASPART 100 [IU]/ML
INJECTION, SOLUTION INTRAVENOUS; SUBCUTANEOUS
Qty: 45 ML | Refills: 11 | Status: SHIPPED | OUTPATIENT
Start: 2022-11-30 | End: 2023-04-17

## 2022-11-30 NOTE — TELEPHONE ENCOUNTER
Rec'd call from Norah wakefield/ [facility].  States the Novolog RX was sent for 18 ml for 30 days, but if MDD is 150 as it states on RX she will need 45 ml for 30 days.  Verbal given to change qty to 45 ml for 30 days.

## 2022-11-30 NOTE — TELEPHONE ENCOUNTER
----- Message from Valery Flowers sent at 11/30/2022  8:57 AM CST -----  Regarding: PHARMACY CALL BACK  Type:  Pharmacy Calling to Clarify an RX    Name of Caller: Norah from [facility]      Pharmacy Name:     [id number] - [place] - [address]  [address]  [place] [address]  Phone: [phone] Fax: [phone]        Prescription Name: insulin aspart U-100 (NOVOLOG FLEXPEN U-100 INSULIN) 100 unit/mL (3 mL) InPn pen    What do they need to clarify?: The quantity that was sent in was for 18, there is confusion on the quantity and date supply. Please advise.    Best Call Back Number:    Additional Information:

## 2022-12-01 NOTE — ED NOTES
Blood sugar 207  
Dr Cartwright is in room to assess pt   
Pt identifiers Andra Newell were checked and are correct  LOC: The patient is awake, alert, aware of environment with an appropriate affect. Oriented x4, speaking appropriately  APPEARANCE: Pt resting comfortably, in no acute distress, pt is clean and well groomed, clothing properly fastened  SKIN: Skin warm, dry and intact, normal skin turgor, moist mucus membranes Lower abd incision with steri strip intact  Bruising noted to abd   RESPIRATORY: Airway is open and patent, respirations are spontaneous, even and unlabored, normal effort and rate  CARDIAC: Normal rate and rhythm, no peripheral edema noted, capillary refill < 3 seconds, bilateral radial pulses 2+  ABDOMEN: Soft, nontender, nondistended. Bowel sounds present to all four quad of abd on auscultation  NEUROLOGIC: PERRL, facial expression is symmetrical, patient moving all extremities spontaneously, normal sensation in all extremities when touched with a finger.  Follows all commands appropriately  MUSCULOSKELETAL: No obvious deformities.      
Tele confirmed in war room; NSR 82  
5

## 2022-12-05 DIAGNOSIS — E11.69 TYPE 2 DIABETES MELLITUS WITH OTHER SPECIFIED COMPLICATION, WITH LONG-TERM CURRENT USE OF INSULIN: ICD-10-CM

## 2022-12-05 DIAGNOSIS — E78.2 MIXED DIABETIC HYPERLIPIDEMIA ASSOCIATED WITH TYPE 2 DIABETES MELLITUS: ICD-10-CM

## 2022-12-05 DIAGNOSIS — E11.69 MIXED DIABETIC HYPERLIPIDEMIA ASSOCIATED WITH TYPE 2 DIABETES MELLITUS: ICD-10-CM

## 2022-12-05 DIAGNOSIS — Z79.4 TYPE 2 DIABETES MELLITUS WITH OTHER SPECIFIED COMPLICATION, WITH LONG-TERM CURRENT USE OF INSULIN: ICD-10-CM

## 2022-12-05 RX ORDER — ROSUVASTATIN CALCIUM 20 MG/1
20 TABLET, COATED ORAL NIGHTLY
Qty: 90 TABLET | Refills: 1 | Status: SHIPPED | OUTPATIENT
Start: 2022-12-05 | End: 2023-04-10

## 2022-12-05 RX ORDER — INSULIN DEGLUDEC 200 U/ML
80 INJECTION, SOLUTION SUBCUTANEOUS DAILY
Qty: 12 PEN | Refills: 3 | Status: SHIPPED | OUTPATIENT
Start: 2022-12-05 | End: 2023-04-17

## 2022-12-06 ENCOUNTER — PES CALL (OUTPATIENT)
Dept: ADMINISTRATIVE | Facility: CLINIC | Age: 62
End: 2022-12-06
Payer: MEDICARE

## 2022-12-12 ENCOUNTER — HOSPITAL ENCOUNTER (OUTPATIENT)
Dept: RADIOLOGY | Facility: HOSPITAL | Age: 62
Discharge: HOME | End: 2022-12-12
Attending: PSYCHIATRY & NEUROLOGY
Payer: MEDICARE

## 2022-12-12 DIAGNOSIS — M54.16 LUMBAR RADICULOPATHY, CHRONIC: ICD-10-CM

## 2022-12-12 PROCEDURE — 72148 MRI LUMBAR SPINE W/O DYE: CPT | Mod: TC,PO

## 2022-12-12 PROCEDURE — 72148 MRI LUMBAR SPINE WITHOUT CONTRAST: ICD-10-PCS | Mod: 26,,, | Performed by: RADIOLOGY

## 2022-12-12 PROCEDURE — 72148 MRI LUMBAR SPINE W/O DYE: CPT | Mod: 26,,, | Performed by: RADIOLOGY

## 2022-12-12 NOTE — TELEPHONE ENCOUNTER
Would she like a 90 DAY SUPPLY? Please iquire with patients when these 30 day requests come in prior to sending them. Avoids redoing the script when they later ask for 90

## 2022-12-13 RX ORDER — CLOPIDOGREL BISULFATE 75 MG/1
75 TABLET ORAL DAILY
Qty: 90 TABLET | Refills: 0 | Status: SHIPPED | OUTPATIENT
Start: 2022-12-13 | End: 2022-12-27 | Stop reason: SDUPTHER

## 2022-12-14 ENCOUNTER — PATIENT MESSAGE (OUTPATIENT)
Dept: NEUROLOGY | Facility: CLINIC | Age: 62
End: 2022-12-14
Payer: MEDICARE

## 2022-12-16 ENCOUNTER — PATIENT MESSAGE (OUTPATIENT)
Dept: NEUROLOGY | Facility: CLINIC | Age: 62
End: 2022-12-16
Payer: MEDICARE

## 2022-12-19 ENCOUNTER — PATIENT MESSAGE (OUTPATIENT)
Dept: NEUROLOGY | Facility: CLINIC | Age: 62
End: 2022-12-19
Payer: MEDICARE

## 2022-12-19 NOTE — TELEPHONE ENCOUNTER
Patient advised to follow up with her spine surgeon due to worsening changes on MRI that correlate with her symptoms.  Next option is [facility] Sp[facility] clinic if no surgery is indicated.

## 2022-12-27 ENCOUNTER — PATIENT OUTREACH (OUTPATIENT)
Dept: ADMINISTRATIVE | Facility: HOSPITAL | Age: 62
End: 2022-12-27
Payer: MEDICARE

## 2022-12-27 DIAGNOSIS — E11.8 CONTROLLED TYPE 2 DIABETES MELLITUS WITH COMPLICATION, WITH LONG-TERM CURRENT USE OF INSULIN: Primary | Chronic | ICD-10-CM

## 2022-12-27 DIAGNOSIS — Z79.4 CONTROLLED TYPE 2 DIABETES MELLITUS WITH COMPLICATION, WITH LONG-TERM CURRENT USE OF INSULIN: Primary | Chronic | ICD-10-CM

## 2022-12-27 NOTE — PROGRESS NOTES
UNCONTROLLED A1C REPORT.  PATIENT HAS AN UPCOMING LAB APPOINTMENT.  CHART REVIEWED;  LABS ORDERED AND LINKED WHAT IS NEEDED    Hemoglobin A1C   Date Value Ref Range Status   08/30/2022 8.3 (H) 0.0 - 5.6 % Final     Comment:     Reference Interval:  5.0 - 5.6 Normal   5.7 - 6.4 High Risk   > 6.5 Diabetic      Hgb A1c results are standardized based on the (NGSP) National   Glycohemoglobin Standardization Program.      Hemoglobin A1C levels are related to mean serum/plasma glucose   during the preceding 2-3 months.        08/30/2022 8.3 (H) 4.0 - 5.6 % Final     Comment:     ADA Screening Guidelines:  5.7-6.4%  Consistent with prediabetes  >or=6.5%  Consistent with diabetes    High levels of fetal hemoglobin interfere with the HbA1C  assay. Heterozygous hemoglobin variants (HbS, HgC, etc)do  not significantly interfere with this assay.   However, presence of multiple variants may affect accuracy.     05/19/2022 7.9 (H) 4.0 - 5.6 % Final     Comment:     ADA Screening Guidelines:  5.7-6.4%  Consistent with prediabetes  >or=6.5%  Consistent with diabetes    High levels of fetal hemoglobin interfere with the HbA1C  assay. Heterozygous hemoglobin variants (HbS, HgC, etc)do  not significantly interfere with this assay.   However, presence of multiple variants may affect accuracy.

## 2023-01-04 ENCOUNTER — LAB VISIT (OUTPATIENT)
Dept: LAB | Facility: HOSPITAL | Age: 63
End: 2023-01-04
Attending: INTERNAL MEDICINE
Payer: MEDICARE

## 2023-01-04 DIAGNOSIS — E11.8 CONTROLLED TYPE 2 DIABETES MELLITUS WITH COMPLICATION, WITH LONG-TERM CURRENT USE OF INSULIN: Chronic | ICD-10-CM

## 2023-01-04 DIAGNOSIS — Z79.4 CONTROLLED TYPE 2 DIABETES MELLITUS WITH COMPLICATION, WITH LONG-TERM CURRENT USE OF INSULIN: Chronic | ICD-10-CM

## 2023-01-04 DIAGNOSIS — Z94.0 KIDNEY REPLACED BY TRANSPLANT: ICD-10-CM

## 2023-01-04 LAB
ALBUMIN SERPL BCP-MCNC: 3.8 G/DL (ref 3.5–5.2)
ALBUMIN SERPL BCP-MCNC: 3.8 G/DL (ref 3.5–5.2)
ALP SERPL-CCNC: 122 U/L (ref 55–135)
ALT SERPL W/O P-5'-P-CCNC: 53 U/L (ref 10–44)
ANION GAP SERPL CALC-SCNC: 9 MMOL/L (ref 8–16)
AST SERPL-CCNC: 57 U/L (ref 10–40)
BASOPHILS # BLD AUTO: 0.03 K/UL (ref 0–0.2)
BASOPHILS NFR BLD: 0.5 % (ref 0–1.9)
BILIRUB DIRECT SERPL-MCNC: 0.2 MG/DL (ref 0.1–0.3)
BILIRUB SERPL-MCNC: 0.4 MG/DL (ref 0.1–1)
BUN SERPL-MCNC: 18 MG/DL (ref 8–23)
CALCIUM SERPL-MCNC: 10.2 MG/DL (ref 8.7–10.5)
CHLORIDE SERPL-SCNC: 105 MMOL/L (ref 95–110)
CO2 SERPL-SCNC: 29 MMOL/L (ref 23–29)
CREAT SERPL-MCNC: 1 MG/DL (ref 0.5–1.4)
DIFFERENTIAL METHOD: ABNORMAL
EOSINOPHIL # BLD AUTO: 0.9 K/UL (ref 0–0.5)
EOSINOPHIL NFR BLD: 13.8 % (ref 0–8)
ERYTHROCYTE [DISTWIDTH] IN BLOOD BY AUTOMATED COUNT: 14.5 % (ref 11.5–14.5)
EST. GFR  (NO RACE VARIABLE): >60 ML/MIN/1.73 M^2
ESTIMATED AVG GLUCOSE: 169 MG/DL (ref 68–131)
GLUCOSE SERPL-MCNC: 179 MG/DL (ref 70–110)
HBA1C MFR BLD: 7.5 % (ref 4–5.6)
HCT VFR BLD AUTO: 36.8 % (ref 37–48.5)
HGB BLD-MCNC: 11.5 G/DL (ref 12–16)
IMM GRANULOCYTES # BLD AUTO: 0.02 K/UL (ref 0–0.04)
IMM GRANULOCYTES NFR BLD AUTO: 0.3 % (ref 0–0.5)
LYMPHOCYTES # BLD AUTO: 1.3 K/UL (ref 1–4.8)
LYMPHOCYTES NFR BLD: 19.6 % (ref 18–48)
MAGNESIUM SERPL-MCNC: 1.5 MG/DL (ref 1.6–2.6)
MCH RBC QN AUTO: 27.9 PG (ref 27–31)
MCHC RBC AUTO-ENTMCNC: 31.3 G/DL (ref 32–36)
MCV RBC AUTO: 89 FL (ref 82–98)
MONOCYTES # BLD AUTO: 0.5 K/UL (ref 0.3–1)
MONOCYTES NFR BLD: 7.3 % (ref 4–15)
NEUTROPHILS # BLD AUTO: 3.8 K/UL (ref 1.8–7.7)
NEUTROPHILS NFR BLD: 58.5 % (ref 38–73)
NRBC BLD-RTO: 0 /100 WBC
PHOSPHATE SERPL-MCNC: 4.5 MG/DL (ref 2.7–4.5)
PLATELET # BLD AUTO: 201 K/UL (ref 150–450)
PMV BLD AUTO: 10.8 FL (ref 9.2–12.9)
POTASSIUM SERPL-SCNC: 5.6 MMOL/L (ref 3.5–5.1)
PROT SERPL-MCNC: 7.3 G/DL (ref 6–8.4)
RBC # BLD AUTO: 4.12 M/UL (ref 4–5.4)
SODIUM SERPL-SCNC: 143 MMOL/L (ref 136–145)
WBC # BLD AUTO: 6.47 K/UL (ref 3.9–12.7)

## 2023-01-04 PROCEDURE — 87799 DETECT AGENT NOS DNA QUANT: CPT | Mod: 91 | Performed by: INTERNAL MEDICINE

## 2023-01-04 PROCEDURE — 85025 COMPLETE CBC W/AUTO DIFF WBC: CPT | Performed by: INTERNAL MEDICINE

## 2023-01-04 PROCEDURE — 84075 ASSAY ALKALINE PHOSPHATASE: CPT | Performed by: INTERNAL MEDICINE

## 2023-01-04 PROCEDURE — 87799 DETECT AGENT NOS DNA QUANT: CPT | Performed by: INTERNAL MEDICINE

## 2023-01-04 PROCEDURE — 83735 ASSAY OF MAGNESIUM: CPT | Performed by: INTERNAL MEDICINE

## 2023-01-04 PROCEDURE — 83036 HEMOGLOBIN GLYCOSYLATED A1C: CPT | Performed by: FAMILY MEDICINE

## 2023-01-04 PROCEDURE — 80197 ASSAY OF TACROLIMUS: CPT | Performed by: INTERNAL MEDICINE

## 2023-01-04 PROCEDURE — 36415 COLL VENOUS BLD VENIPUNCTURE: CPT | Mod: PN | Performed by: INTERNAL MEDICINE

## 2023-01-04 PROCEDURE — 80069 RENAL FUNCTION PANEL: CPT | Performed by: INTERNAL MEDICINE

## 2023-01-05 LAB — TACROLIMUS BLD-MCNC: 4.2 NG/ML (ref 5–15)

## 2023-01-10 ENCOUNTER — DOCUMENTATION ONLY (OUTPATIENT)
Dept: TRANSPLANT | Facility: CLINIC | Age: 63
End: 2023-01-10
Payer: MEDICARE

## 2023-01-10 ENCOUNTER — TELEPHONE (OUTPATIENT)
Dept: TRANSPLANT | Facility: CLINIC | Age: 63
End: 2023-01-10
Payer: MEDICARE

## 2023-01-10 DIAGNOSIS — Z94.0 KIDNEY REPLACED BY TRANSPLANT: Primary | ICD-10-CM

## 2023-01-10 LAB
B19V DNA # SPEC NAA+PROBE: ABNORMAL COPIES/ML
BKV DNA SERPL NAA+PROBE-ACNC: <125 COPIES/ML
BKV DNA SERPL NAA+PROBE-LOG#: <2.1 LOG (10) COPIES/ML
BKV DNA SERPL QL NAA+PROBE: NOT DETECTED
SPECIMEN SOURCE: ABNORMAL

## 2023-01-10 NOTE — TELEPHONE ENCOUNTER
Results reviewed with patient and will repeat a Stat K here tomorrow prior to clinic visit.  ----- Message from Maureen Cabral MD sent at 1/10/2023 12:52 PM CST -----  Parvovirus not detected in any sig amount for over a year- no need to recheck.in future.

## 2023-01-11 ENCOUNTER — LAB VISIT (OUTPATIENT)
Dept: LAB | Facility: HOSPITAL | Age: 63
End: 2023-01-11
Attending: INTERNAL MEDICINE
Payer: MEDICARE

## 2023-01-11 ENCOUNTER — OFFICE VISIT (OUTPATIENT)
Dept: TRANSPLANT | Facility: CLINIC | Age: 63
End: 2023-01-11
Payer: MEDICARE

## 2023-01-11 VITALS
HEART RATE: 99 BPM | DIASTOLIC BLOOD PRESSURE: 70 MMHG | OXYGEN SATURATION: 94 % | TEMPERATURE: 97 F | WEIGHT: 208.56 LBS | HEIGHT: 67 IN | BODY MASS INDEX: 32.73 KG/M2 | RESPIRATION RATE: 18 BRPM | SYSTOLIC BLOOD PRESSURE: 130 MMHG

## 2023-01-11 DIAGNOSIS — I15.1 HYPERTENSION SECONDARY TO OTHER RENAL DISORDERS: ICD-10-CM

## 2023-01-11 DIAGNOSIS — R74.01 TRANSAMINITIS: ICD-10-CM

## 2023-01-11 DIAGNOSIS — Z94.0 KIDNEY REPLACED BY TRANSPLANT: ICD-10-CM

## 2023-01-11 DIAGNOSIS — E87.5 HYPERKALEMIA: ICD-10-CM

## 2023-01-11 DIAGNOSIS — Z91.89 AT RISK FOR OPPORTUNISTIC INFECTIONS: ICD-10-CM

## 2023-01-11 DIAGNOSIS — N18.2 CHRONIC KIDNEY DISEASE (CKD), STAGE II (MILD): ICD-10-CM

## 2023-01-11 DIAGNOSIS — B34.3 PARVOVIRUS B19 INFECTION: ICD-10-CM

## 2023-01-11 DIAGNOSIS — R10.31 RIGHT LOWER QUADRANT ABDOMINAL PAIN: ICD-10-CM

## 2023-01-11 DIAGNOSIS — Z94.0 STATUS POST LIVING-DONOR KIDNEY TRANSPLANTATION: Primary | ICD-10-CM

## 2023-01-11 DIAGNOSIS — Z79.60 LONG-TERM USE OF IMMUNOSUPPRESSANT MEDICATION: ICD-10-CM

## 2023-01-11 LAB — POTASSIUM SERPL-SCNC: 4.9 MMOL/L (ref 3.5–5.1)

## 2023-01-11 PROCEDURE — 1159F PR MEDICATION LIST DOCUMENTED IN MEDICAL RECORD: ICD-10-PCS | Mod: CPTII,S$GLB,, | Performed by: INTERNAL MEDICINE

## 2023-01-11 PROCEDURE — 3066F NEPHROPATHY DOC TX: CPT | Mod: CPTII,S$GLB,, | Performed by: INTERNAL MEDICINE

## 2023-01-11 PROCEDURE — 3051F PR MOST RECENT HEMOGLOBIN A1C LEVEL 7.0 - < 8.0%: ICD-10-PCS | Mod: CPTII,S$GLB,, | Performed by: INTERNAL MEDICINE

## 2023-01-11 PROCEDURE — 3051F HG A1C>EQUAL 7.0%<8.0%: CPT | Mod: CPTII,S$GLB,, | Performed by: INTERNAL MEDICINE

## 2023-01-11 PROCEDURE — 99999 PR PBB SHADOW E&M-EST. PATIENT-LVL V: ICD-10-PCS | Mod: PBBFAC,,, | Performed by: INTERNAL MEDICINE

## 2023-01-11 PROCEDURE — 1160F PR REVIEW ALL MEDS BY PRESCRIBER/CLIN PHARMACIST DOCUMENTED: ICD-10-PCS | Mod: CPTII,S$GLB,, | Performed by: INTERNAL MEDICINE

## 2023-01-11 PROCEDURE — 3066F PR DOCUMENTATION OF TREATMENT FOR NEPHROPATHY: ICD-10-PCS | Mod: CPTII,S$GLB,, | Performed by: INTERNAL MEDICINE

## 2023-01-11 PROCEDURE — 99215 PR OFFICE/OUTPT VISIT, EST, LEVL V, 40-54 MIN: ICD-10-PCS | Mod: GC,S$GLB,, | Performed by: INTERNAL MEDICINE

## 2023-01-11 PROCEDURE — 99215 OFFICE O/P EST HI 40 MIN: CPT | Mod: GC,S$GLB,, | Performed by: INTERNAL MEDICINE

## 2023-01-11 PROCEDURE — 3075F SYST BP GE 130 - 139MM HG: CPT | Mod: CPTII,S$GLB,, | Performed by: INTERNAL MEDICINE

## 2023-01-11 PROCEDURE — 3061F PR NEG MICROALBUMINURIA RESULT DOCUMENTED/REVIEW: ICD-10-PCS | Mod: CPTII,S$GLB,, | Performed by: INTERNAL MEDICINE

## 2023-01-11 PROCEDURE — 99999 PR PBB SHADOW E&M-EST. PATIENT-LVL V: CPT | Mod: PBBFAC,,, | Performed by: INTERNAL MEDICINE

## 2023-01-11 PROCEDURE — 3061F NEG MICROALBUMINURIA REV: CPT | Mod: CPTII,S$GLB,, | Performed by: INTERNAL MEDICINE

## 2023-01-11 PROCEDURE — 3075F PR MOST RECENT SYSTOLIC BLOOD PRESS GE 130-139MM HG: ICD-10-PCS | Mod: CPTII,S$GLB,, | Performed by: INTERNAL MEDICINE

## 2023-01-11 PROCEDURE — 3008F PR BODY MASS INDEX (BMI) DOCUMENTED: ICD-10-PCS | Mod: CPTII,S$GLB,, | Performed by: INTERNAL MEDICINE

## 2023-01-11 PROCEDURE — 1160F RVW MEDS BY RX/DR IN RCRD: CPT | Mod: CPTII,S$GLB,, | Performed by: INTERNAL MEDICINE

## 2023-01-11 PROCEDURE — 1159F MED LIST DOCD IN RCRD: CPT | Mod: CPTII,S$GLB,, | Performed by: INTERNAL MEDICINE

## 2023-01-11 PROCEDURE — 36415 COLL VENOUS BLD VENIPUNCTURE: CPT | Performed by: INTERNAL MEDICINE

## 2023-01-11 PROCEDURE — 84132 ASSAY OF SERUM POTASSIUM: CPT | Performed by: INTERNAL MEDICINE

## 2023-01-11 PROCEDURE — 3078F PR MOST RECENT DIASTOLIC BLOOD PRESSURE < 80 MM HG: ICD-10-PCS | Mod: CPTII,S$GLB,, | Performed by: INTERNAL MEDICINE

## 2023-01-11 PROCEDURE — 3078F DIAST BP <80 MM HG: CPT | Mod: CPTII,S$GLB,, | Performed by: INTERNAL MEDICINE

## 2023-01-11 PROCEDURE — 3008F BODY MASS INDEX DOCD: CPT | Mod: CPTII,S$GLB,, | Performed by: INTERNAL MEDICINE

## 2023-01-11 RX ORDER — GABAPENTIN 100 MG/1
100 CAPSULE ORAL
COMMUNITY
Start: 2022-11-26 | End: 2023-04-10 | Stop reason: CLARIF

## 2023-01-11 RX ORDER — TACROLIMUS 1 MG/1
1 CAPSULE ORAL EVERY 12 HOURS
Qty: 60 CAPSULE | Refills: 11 | Status: SHIPPED | OUTPATIENT
Start: 2023-01-11 | End: 2024-02-08 | Stop reason: SDUPTHER

## 2023-01-11 RX ORDER — LIDOCAINE 50 MG/G
1 PATCH TOPICAL
COMMUNITY
Start: 2022-12-31

## 2023-01-11 NOTE — LETTER
January 11, 2023        Dalton Heaton  [address]  [facility] NEPHROLOG[facility] [place] [address]  Phone: [phone]  Fax: [phone]             [facility] 1st Fl  [address]  [facility] [address]  Phone: [phone]   Patient: Andra Newell   MR Number: [id number]   YOB: 1960   Date of Visit: 1/11/2023       Dear Dr. Dalton Heaton    Thank you for referring Andra Newell to me for evaluation. Attached you will find relevant portions of my assessment and plan of care.    If you have questions, please do not hesitate to call me. I look forward to following Andra Newell along with you.    Sincerely,    Maureen Cabral MD    Enclosure    If you would like to receive this communication electronically, please contact [email or web address] or [phone] to request [employer] access.    [employer] is a tool which provides read-only access to select patient information with whom you have a relationship. Its easy to use and provides real time access to review your patients record including encounter summaries, notes, results, and demographic information.    If you feel you have received this communication in error or would no longer like to receive these types of communications, please e-mail [email or web address]

## 2023-01-11 NOTE — PATIENT INSTRUCTIONS
Congratulations on reaching another anniversary after transplant! I expect you will have many more!    -hepatology consult  Renal ultrasound to check pain in abdomen    -TACROLIMUS level GOAL 4-6 and should be checked with kidney function every 3-4 months    -You will need a team to care for you the best way possible! For your health, you should follow with your general nephrologist, primary care provider/PCP, dentist and eye doctor, and GYN (for ladies) for routine/preventative care. If you are a diabetic, you should see a podiatrist for regular foot checks. If you need help     -Note goal BP for patients like you with kidney disease is less than 120//80.  Please keep checking your blood pressure and report to your doctors if the readings stay too high to protect your kidney from damage.      -Remember to keep transplant posted about medication changes or new developments in your health. These may warrant changing your immunosuppression. We are not automatically notified of changes in your condition, so please call or message us with any updates.    -Don't forget we have pharmacist, dietician and social workers that are able to help you, at your request.    -We recommend you stay up to date with vaccines - your primary care provider will help with this, since vaccine recommendations vary by age and get updated regularly. You should get a yearly influenza vaccine. It does not protect you against all infections, and you can still get the flu. The influenza vaccine has been shown to help keep patients from having as severe disease compared to a group of unvaccinated patients.    DOs and DON'Ts FOR TRANSPLANT PATIENTS USING OVER THE COUNTER REMEDIES    Acne  - Clean affected areas with Panoxyl foaming wash [or any other wash containing 10% benzoyl peroxide] - follow direction on package insert and beware it can bleach fabrics and hair.  - You may also try Differin [adapalene gel  0.1%] to the affected areas as  per package insert as well.  - A good moisturizer may be needed if skin dries out. Cetaphil and Cerave make ones often recommended by dermatologists.  - Don't forget to protect your skin from the ski, since you are at increased risk of skin cancer.    Hair Loss - Minoxidil (Rogaine) topically on scalp. Note it wears off if you stop using, so plan on stayingon it as long as you want the effects to last.    Arthritis   - We do not recommend NSAIDS  by mouth such as Motrin, Advil, Aleve, naprosen, BC powders, etc. They can cause harm to the kidney. If in doubt, please check!  - Acetaminophen up to 3000 mg (3 grams) every 24 hrs is safe. May people try 1 gram (2 extra strength) tabs/caps every 8 hours.  - Topical medicines are OK: Voltaren (diclofenac) gel, lidocaine gel, lidocaine patches, salon pas patches  - Avoid Turmeric (active ingredient: curcumin) as it interacts with your immunosuppression    Fever or pain   - Tylenol (acetaminophen).   - For pain you can try salon pas or lidocaine patches to be applied directly to area of pain.   - Diclofenac [Voltaren] gel is safe to use for a few weeks.    - We do not recommend NSAIDS  by mouth such as Motrin or Advil (ibuprofen), Aleve or naprosen (naproxen), BC powders, etc. If in doubt, please check as there are several others that can be prescribed!    Cough Suppressant - Dextromethorphan Hydrobromide 30 mg or cough drops (Halls or equivalent generic)    Nasal congestion   - Saline nasal spray is very safe.   - Oxymetazoline (Afrin), but should ONLY USE FOR 3 DAYS.   - Topical Vicks vaporub or Vicks nasal inhaler is also acceptable.  - Note tablet form decongestants (Sudafed, phenylephrine) can raise blood pressure and are not recommended    Allergy symptoms - Antihistamines are safe: diphenhydramine (Benadryl), loratadine (Claritin), cetrizine (Zyrtec). These can also help dry up drainage.    For sore throat -  salt water gargles, sore throat sprays like Chloraseptic or  sore throat lozenges are safe for temporary relief    For thick secretions (thick mucous) - Guaifenesin (Mucinex), saline nasal spray     To prevent colds - low dose vitamin C is probably OK, but can increase risk of kidney stones. Zinc lozenges (not tablets) taken through the day may help minimize. Let the lozenge dissolve in the back of your throat. Note: Zinc can inhibit the virus from multiplying in your throat, but it is not proven to prevent colds.    For memory Loss - OK to try Prevagen    Indigestion  - famotidine (Pepcid) 20 mg daily is generally safe. Calcium carbonate (Tums and many other brands) can cause high calcium and should be taken only after taking to your provider about your calcium levels. High calcium can hurt the kidney. Avoid cimetidine (Tagamet) as it can interfere with your immunosuppression and cause toxic levels. A few doses of pepto bismol should be fine, but continued indigestion should be evaluated by your provider.    Nausea or vomiting - these symptoms can indicate there is something wrong with your stomach and should be evaluated if they last more than 6-8 hours OR anytime you cannot keep your medicines down. Not being able to take your medicine can cause complications.     Diarrhea -  Pepto-Bismol, even Metamucil can help liquid stools. Diarrhea can be a sign of infection, so please let your provider know if it continues past 1-2 days for full evaluation. Avoid Imodium unless you have spoken to a provider.    Constipation- Colace, Dulcolax, MiraLax are safe to take regardless of your kidney function.  Please check with the provider before using magnesium or phosphorus containing supplements such as magnesium citrate, milk of magnesium, or Fleet's Phospho-Soda.  These remedies may cause harm, depending on the degree of kidney insufficiency you have.  Your doctor can advise if year magnesium and phosphorus levels are low enough to take these.    Herbal and natural remedies - Some  herbals are very safe and effective while others are proven to cause renal failure or interact with your medication. Do NOT take any natural or herbal remedies without discussing with transplant.    If you develop fever or shortness of breath, or start to feel worse, you need to get checked out by a provider in a clinic or go to ED, depending on the severity of your symptoms.    As always, feel free to ask any questions and raise any concerns regarding your health care.    Best Wishes,  Dr. Shirley Cabral and your entire transplant team

## 2023-01-11 NOTE — PROGRESS NOTES
"  Subjective     Chief Complaint: follow up    History of Present Illness:  Ms. Andra Newell is a 62 y.o. female with a history of T2DM (7.9), CAD with HAKAN (1/2022), CKD 2 sp living donor kidney transplant (campath induction), hx of parvo B19 infection (resolved) presents for her yearly follow up    Per Dr. Avery's note: 2/14/2022  "Pertinent History:  -LURD KT (friend) 01/14/2019, Induced with Campath.     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 Rx until 7/14/19  - FUNGAL px: None  - -admitted 05/07/2019 with fever infectious workup negative for bacteria.  Refractory anemia noted, and it was found a parvovirus B19 from 4/15/19 causing aplastic anemia was positive  - recurrent UTI - resolved, saw Uro gynecologist at [facility]"  Plan after her visit were to stop checking parvovirus titers, continue with CKD 2/3a, continue tacro, continue holding MMF in setting of parvovirus infection.     Apropos current regiment: Tacro 1/1  Tacrolimus Lvl   Date Value Ref Range Status   01/04/2023 4.2 (L) 5.0 - 15.0 ng/mL Final     Comment:     Testing performed by a chemiluminescent microparticle   immunoassay on the [employer] System.     Goal 4 to 8      Since then: She reports that approx 2 weeks ago, she had severe right lower quadrant pain that has since resolved. She described it as sharp, shooting, without radiation, lasting 10 - 15 minutes. Remitted for several hours. Overall the pain lasted for several days. She rated the pain as a 7/10, have fentanyl pump (follows " pain medicine). Nothing improved the pain/nothing worsened the pain. No home remedies were done. Reports being nauseous, prior to episodes, but currently worse. Denies any fevers, chills, diarrhea, constipation, blood in urine.    Diet: has been losing. Minimal red meat. Drinks 1-2 liters per day. Does not watch salt intake. Most meals are cooked at home. Will be trying to switch to salt-alternatives. Reports sugar checks to be okay, has Dexcom.     Reports hypoglycemic episodes requiring PO sugar intake: once to twice a week. DM is handled by Dr. Vaughn (sees him in several weeks).     BP today 178/77. Checks at home - BP 140s - 150s/70s. Has taken all meds today  Repeat - 135/71  BMI 32.66    Water aerobics - has not yet done so, but there is a routine plan in place      Review of Systems   Constitutional:  Negative for chills and fever.   HENT:  Negative for ear discharge and ear pain.    Eyes:  Negative for blurred vision and double vision.   Respiratory:  Negative for cough and shortness of breath.    Cardiovascular:  Negative for chest pain and palpitations.   Gastrointestinal:  Positive for abdominal pain. Negative for constipation, diarrhea and vomiting.   Genitourinary:  Negative for dysuria, frequency and hematuria.   Musculoskeletal:  Negative for myalgias and neck pain.   Skin:  Negative for itching and rash.   Neurological:  Negative for tingling and headaches.     PAST HISTORY:     Past Medical History:   Diagnosis Date    Anemia     Anemia in chronic kidney disease, on chronic dialysis 7/12/2017    Anxiety and depression     Aplastic anemia with parvovirus B19 infection 5/27/2019    Arthritis     Asthma     allergic airway    CKD stage III (moderate) 2/18/2019    Colon polyp     Depression     Diabetes mellitus     Diverticulosis     Encounter for blood transfusion     Eosinophilia     ESRD (end stage renal disease)     stage V, due for living donor 9/2018    ESRD on dialysis     GERD  "(gastroesophageal reflux disease)     Gout     Gout     Hyperlipidemia     Hypertension     Hypertriglyceridemia without hypercholesterolemia 6/9/2019    Low back pain     Low HDL (under 40) 6/9/2019    MRSA carrier     Neutropenia, unspecified 5/8/2019    Obesity     Renal disorder     Rotator cuff syndrome--left     Thyroid disease     Ulnar neuropathy of right upper extremity     Uncontrolled type 2 diabetes mellitus with hyperglycemia        Past Surgical History:   Procedure Laterality Date    ACHILLES TENDON SURGERY Left May 2015    BACK SURGERY      CERVICAL SPINE SURGERY  2021    cervical gate placed by Dr. Vera- screws,plate, and "gate"    CHOLECYSTECTOMY      COLONOSCOPY N/A 9/6/2017    Procedure: COLONOSCOPY;  Surgeon: Marisol Bryson MD;  Location: [facility];  Service: Endoscopy;  Laterality: N/A; REPEAT IN 3 YEARS FOR SURVEILLANCE    COLONOSCOPY N/A 5/9/2019    Procedure: COLONOSCOPY;  Surgeon: Fady Ewing MD;  Location: [facility] (2ND FLR);  Service: Endoscopy;  Laterality: N/A;    CORONARY ANGIOGRAPHY  8/31/2022    Procedure: ANGIOGRAM, CORONARY ARTERY;  Surgeon: Christoph Dang MD;  Location: [facility] CATH;  Service: Cardiology;;    DIALYSIS FISTULA CREATION  12/2017    ESOPHAGOGASTRODUODENOSCOPY N/A 5/9/2019    Procedure: EGD (ESOPHAGOGASTRODUODENOSCOPY);  Surgeon: Fady Ewing MD;  Location: [facility] (2ND FLR);  Service: Endoscopy;  Laterality: N/A;    ESOPHAGOGASTRODUODENOSCOPY N/A 6/10/2021    Procedure: EGD (ESOPHAGOGASTRODUODENOSCOPY);  Surgeon: Marisol Bryson MD;  Location: [facility];  Service: Endoscopy;  Laterality: N/A;    HEMORRHOID SURGERY      INJECTION OF ANESTHETIC AGENT AROUND MEDIAL BRANCH NERVES INNERVATING LUMBAR FACET JOINT Right 9/25/2019    Procedure: Block-nerve-medial branch-lumbar;  Surgeon: Yonny Ferrer MD;  Location: [facility] OR;  Service: Pain Management;  Laterality: Right;  L2, 3, 4,5     INJECTION OF ANESTHETIC AGENT AROUND MEDIAL BRANCH NERVES INNERVATING " LUMBAR FACET JOINT Right 10/16/2019    Procedure: Block-nerve-medial branch-lumbar;  Surgeon: Yonny Ferrer MD;  Location: [facility] OR;  Service: Pain Management;  Laterality: Right;  L2, 3, 4,5     JOINT REPLACEMENT      left    KIDNEY TRANSPLANT N/A 1/14/2019    Procedure: TRANSPLANT, KIDNEY;  Surgeon: Roland Salazar MD;  Location: [facility] OR 2ND FLR;  Service: Transplant;  Laterality: N/A;    KNEE SURGERY      RADIOFREQUENCY ABLATION OF LUMBAR MEDIAL BRANCH NERVE AT SINGLE LEVEL Right 10/29/2019    Procedure: Radiofrequency Ablation, Nerve, Spinal, Lumbar, Medial Branch, 1 Level;  Surgeon: Yonny Ferrer MD;  Location: [facility] OR;  Service: Pain Management;  Laterality: Right;  L2, 3, 4, 5  burned at 80 degrees C X 60 seconds each site X 2    SHOULDER ARTHROSCOPY      SHOULDER SURGERY      UPPER GASTROINTESTINAL ENDOSCOPY  ~2013    Dr. Moralez       Family History   Problem Relation Age of Onset    Diabetes Mother     Alzheimer's disease Mother     Thyroid disease Mother     Hyperlipidemia Mother     Heart disease Father     Stroke Father     Diabetes Sister     Lupus Sister     Ovarian cancer Sister     Heart disease Brother     No Known Problems Son     Diabetes Maternal Grandmother     Hypertension Maternal Grandmother     No Known Problems Paternal Grandmother     No Known Problems Paternal Grandfather     COPD Maternal Aunt     Diabetes Maternal Aunt     Heart disease Maternal Aunt     Hypertension Maternal Aunt     No Known Problems Maternal Uncle     No Known Problems Paternal Aunt     No Known Problems Paternal Uncle     Colon cancer Neg Hx     Colon polyps Neg Hx     Crohn's disease Neg Hx     Ulcerative colitis Neg Hx     Celiac disease Neg Hx        Social History     Socioeconomic History    Marital status: Significant Other     Spouse name: Any Alvarez    Number of children: 2    Highest education level: 12th grade   Occupational History    Occupation: retired     Comment:    Tobacco Use     Smoking status: Never    Smokeless tobacco: Never   Substance and Sexual Activity    Alcohol use: No     Alcohol/week: 0.0 standard drinks    Drug use: No    Sexual activity: Yes     Partners: Female   Social History Narrative     female    Disabled since 2015 due to DDD and severe osteoarthritis of knee and hips    Previously worked as  at [employer]    Lives in residential 1 story home with partner, Any.     Has 2 boys, 1 lives in [place], 1 in [place].         Restorationist: Zoroastrian    Hobbies: painting and crafts.         MEDICATIONS & ALLERGIES:     Current Outpatient Medications on File Prior to Visit   Medication Sig    acetaminophen (TYLENOL) 325 MG tablet Take 2 tablets (650 mg total) by mouth every 8 (eight) hours as needed for Pain.    aspirin (ECOTRIN) 81 MG EC tablet Take 1 tablet (81 mg total) by mouth once daily.    clopidogreL (PLAVIX) 75 mg tablet Take 1 tablet (75 mg total) by mouth once daily.    colchicine (COLCRYS) 0.6 mg tablet Take 1 tablet (0.6 mg total) by mouth 2 (two) times daily.    famotidine (PEPCID) 40 MG tablet Take 1 tablet (40 mg total) by mouth every evening.    flash glucose sensor (Jump On ItSTYLE DAKOTAH 14 DAY SENSOR) Kit Use 1 sensor every 14 days to check blood glucose    insulin aspart U-100 (NOVOLOG FLEXPEN U-100 INSULIN) 100 unit/mL (3 mL) InPn pen Pt is taking 32 units with meals plus ss for a max dose of 150 units a day.    insulin degludec (TRESIBA FLEXTOUCH U-200) 200 unit/mL (3 mL) insulin pen Inject 80 Units into the skin once daily.    isosorbide mononitrate (IMDUR) 30 MG 24 hr tablet TAKE ONE TABLET BY MOUTH DAILY    levothyroxine (SYNTHROID) 75 MCG tablet TAKE ONE TABLET BY MOUTH ONCE DAILY    LORazepam (ATIVAN) 1 MG tablet Take 1 mg by mouth every evening.    losartan (COZAAR) 50 MG tablet Take 1 tablet (50 mg total) by mouth once daily.    magnesium oxide 500 mg Tab Take 500 mg by mouth once daily.    metFORMIN (GLUCOPHAGE-XR) 500 MG ER  "24hr tablet TAKE ONE TABLET BY MOUTH TWICE DAILY WITH FOOD    metoprolol tartrate (LOPRESSOR) 25 MG tablet Take 0.5 tablets (12.5 mg total) by mouth 2 (two) times daily.    multivitamin (THERAGRAN) tablet Take 1 tablet by mouth once daily.    nitroGLYCERIN (NITROSTAT) 0.4 MG SL tablet Place 1 tablet (0.4 mg total) under the tongue every 5 (five) minutes as needed for Chest pain.    omeprazole (PRILOSEC) 40 MG capsule Take 1 capsule (40 mg total) by mouth 2 (two) times daily before meals for 14 days, THEN 1 capsule (40 mg total) every morning.    rosuvastatin (CRESTOR) 20 MG tablet Take 1 tablet (20 mg total) by mouth every evening.    sucralfate (CARAFATE) 1 gram tablet Take 1 tablet (1 g total) by mouth every 6 (six) hours as needed (GERD).    tacrolimus (PROGRAF) 1 MG Cap Take 1 capsule (1 mg total) by mouth every 12 (twelve) hours.    tiZANidine (ZANAFLEX) 4 MG tablet Take 1 tablet by mouth every evening.    vilazodone (VIIBRYD) 40 mg Tab tablet Take 1 tablet (40 mg total) by mouth once daily.     No current facility-administered medications on file prior to visit.       Review of patient's allergies indicates:   Allergen Reactions    Dilaudid [hydromorphone] Itching    Morphine Itching    Torsemide Diarrhea     Diarrhea stopped once discontinued med    Codeine Itching     Can take oxycodone and hydrocodone with Benadryl       OBJECTIVE:     Vital Signs:  Vitals:    01/11/23 1340 01/11/23 1444   BP: (!) 178/77 130/70   BP Location: Right arm    Patient Position: Sitting    BP Method: Medium (Automatic)    Pulse: 99    Resp: 18    Temp: 97.3 °F (36.3 °C)    TempSrc: Temporal    SpO2: (!) 94%    Weight: 94.6 kg (208 lb 8.9 oz)    Height: 5' 7" (1.702 m)        Body mass index is 32.66 kg/m².     Physical Exam  Constitutional:       General: She is not in acute distress.     Appearance: Normal appearance. She is obese. She is not ill-appearing or diaphoretic.   HENT:      Head: Normocephalic and atraumatic.      " Mouth/Throat:      Pharynx: No oropharyngeal exudate or posterior oropharyngeal erythema.   Eyes:      General: No scleral icterus.        Right eye: No discharge.         Left eye: No discharge.      Extraocular Movements: Extraocular movements intact.      Conjunctiva/sclera: Conjunctivae normal.      Pupils: Pupils are equal, round, and reactive to light.   Neck:      Vascular: No JVD.      Trachea: No tracheal deviation.   Cardiovascular:      Rate and Rhythm: Normal rate and regular rhythm.      Heart sounds: No murmur heard.  Pulmonary:      Effort: Pulmonary effort is normal. No respiratory distress.      Breath sounds: Normal breath sounds. No wheezing or rales.   Abdominal:      General: Abdomen is flat. Bowel sounds are normal. There is no distension.      Palpations: Abdomen is soft. There is no mass.      Tenderness: There is no abdominal tenderness.   Musculoskeletal:         General: No swelling, tenderness or deformity. Normal range of motion.      Cervical back: Normal range of motion and neck supple.   Lymphadenopathy:      Cervical: No cervical adenopathy.   Skin:     General: Skin is warm and dry.      Coloration: Skin is not jaundiced or pale.      Findings: No bruising, erythema or rash.   Neurological:      General: No focal deficit present.      Mental Status: She is alert and oriented to person, place, and time. Mental status is at baseline.      Cranial Nerves: No cranial nerve deficit.     Laboratory  Reviewed     Diagnostic Results:    Health Maintenance Due   Topic Date Due    Shingles Vaccine (1 of 2) Never done    COVID-19 Vaccine (3 - Booster for Pfizer series) 09/08/2021    Eye Exam  11/18/2021    Mammogram  08/05/2022     ASSESSMENT & PLAN:   Ms. Andra Newell is a 62 y.o. female     Living-donor kidney transplant 1/14/2019  -      Transplant Kidney With Doppler; Future; Expected date: 01/11/2023    Chronic kidney disease (CKD), stage II (mild)    Hypertension secondary to  other renal disorders    Parvovirus B19 infection    At risk for opportunistic infections    Long-term use of immunosuppressant medication    Hyperkalemia   -repeat resolved 4.9    Kidney replaced by transplant  -     tacrolimus (PROGRAF) 1 MG Cap; Take 1 capsule (1 mg total) by mouth every 12 (twelve) hours.  Dispense: 60 capsule; Refill: 11    Right lower quadrant abdominal pain  -     US Transplant Kidney With Doppler; Future; Expected date: 01/11/2023    Discussed treatment with other providers  PCP to manage hypoglycemia  Discussed that she should be seen by some nephrology provider q6  Discussed right lower quadrant abdominal pain - seems to have resolved. UA without stigmata of infection. Will get US to rule out structural cause    RTC in per protocol    Discussed with Dr. Mercado  - staff attestation to follow    Jax Mcduffie MD  Nephrology PGY-4    STAFF:  Andra Newell was discussed  with Dr. Jax Mcduffie.  I have personally seen, interviewed and evaluated Andra, reviewed the information in this note, and agree with the findings listed in the attached encounter.    1. Living-donor kidney transplant 1/14/2019    2. Chronic kidney disease (CKD), stage II (mild)    3. Hypertension secondary to other renal disorders    4. Parvovirus B19 infection    5. At risk for opportunistic infections    6. Long-term use of immunosuppressant medication    7. Hyperkalemia    8. Kidney replaced by transplant    9. Right lower quadrant abdominal pain    10. Transaminitis    Plan outlined in detail.   CKD2 stable and doing great after kidney txp  Parvovirus resolved - no need to check further  OI screen - BK not detected  IS refills provided; no change in dose; no signs of toxicity.   Abd pain - get allograft US as US clean and doubt UTI. I suspect no renal issues; advised if US OK nad sx continue to discuss with PCP.  I reviewed mild, persistent eosinophilia not of concern to us as it has been very stable and asymptomatic.    Will request hepatology consult in [place] d/t transaminitis.    Maureen Cabral MD

## 2023-01-19 ENCOUNTER — OFFICE VISIT (OUTPATIENT)
Dept: PRIMARY CARE CLINIC | Facility: CLINIC | Age: 63
End: 2023-01-19
Payer: MEDICARE

## 2023-01-19 ENCOUNTER — TELEPHONE (OUTPATIENT)
Dept: NEUROLOGY | Facility: CLINIC | Age: 63
End: 2023-01-19
Payer: MEDICARE

## 2023-01-19 VITALS
WEIGHT: 208.69 LBS | SYSTOLIC BLOOD PRESSURE: 126 MMHG | DIASTOLIC BLOOD PRESSURE: 60 MMHG | BODY MASS INDEX: 32.75 KG/M2 | HEIGHT: 67 IN | OXYGEN SATURATION: 97 % | HEART RATE: 72 BPM | RESPIRATION RATE: 20 BRPM

## 2023-01-19 DIAGNOSIS — E11.8 CONTROLLED TYPE 2 DIABETES MELLITUS WITH COMPLICATION, WITH LONG-TERM CURRENT USE OF INSULIN: Chronic | ICD-10-CM

## 2023-01-19 DIAGNOSIS — Z79.4 TYPE 2 DIABETES MELLITUS WITH STAGE 5 CHRONIC KIDNEY DISEASE NOT ON CHRONIC DIALYSIS, WITH LONG-TERM CURRENT USE OF INSULIN: ICD-10-CM

## 2023-01-19 DIAGNOSIS — N18.5 TYPE 2 DIABETES MELLITUS WITH STAGE 5 CHRONIC KIDNEY DISEASE NOT ON CHRONIC DIALYSIS, WITH LONG-TERM CURRENT USE OF INSULIN: ICD-10-CM

## 2023-01-19 DIAGNOSIS — Z94.0 KIDNEY TRANSPLANT RECIPIENT: ICD-10-CM

## 2023-01-19 DIAGNOSIS — Z79.4 CONTROLLED TYPE 2 DIABETES MELLITUS WITH COMPLICATION, WITH LONG-TERM CURRENT USE OF INSULIN: Chronic | ICD-10-CM

## 2023-01-19 DIAGNOSIS — Z79.60 LONG-TERM USE OF IMMUNOSUPPRESSANT MEDICATION: Primary | Chronic | ICD-10-CM

## 2023-01-19 DIAGNOSIS — D84.9 IMMUNOSUPPRESSION: ICD-10-CM

## 2023-01-19 DIAGNOSIS — Z12.31 ENCOUNTER FOR SCREENING MAMMOGRAM FOR MALIGNANT NEOPLASM OF BREAST: ICD-10-CM

## 2023-01-19 DIAGNOSIS — E11.22 TYPE 2 DIABETES MELLITUS WITH STAGE 5 CHRONIC KIDNEY DISEASE NOT ON CHRONIC DIALYSIS, WITH LONG-TERM CURRENT USE OF INSULIN: ICD-10-CM

## 2023-01-19 DIAGNOSIS — N18.31 STAGE 3A CHRONIC KIDNEY DISEASE: Chronic | ICD-10-CM

## 2023-01-19 DIAGNOSIS — E78.2 MIXED DIABETIC HYPERLIPIDEMIA ASSOCIATED WITH TYPE 2 DIABETES MELLITUS: Chronic | ICD-10-CM

## 2023-01-19 DIAGNOSIS — E11.69 MIXED DIABETIC HYPERLIPIDEMIA ASSOCIATED WITH TYPE 2 DIABETES MELLITUS: Chronic | ICD-10-CM

## 2023-01-19 DIAGNOSIS — E03.9 ACQUIRED HYPOTHYROIDISM: Chronic | ICD-10-CM

## 2023-01-19 DIAGNOSIS — F13.20 BENZODIAZEPINE DEPENDENCE, CONTINUOUS: ICD-10-CM

## 2023-01-19 DIAGNOSIS — D63.8 ANEMIA OF CHRONIC DISEASE: Chronic | ICD-10-CM

## 2023-01-19 DIAGNOSIS — I70.0 AORTIC ATHEROSCLEROSIS: ICD-10-CM

## 2023-01-19 DIAGNOSIS — J45.20 MILD INTERMITTENT ASTHMA WITHOUT COMPLICATION: Chronic | ICD-10-CM

## 2023-01-19 DIAGNOSIS — F33.0 MDD (MAJOR DEPRESSIVE DISORDER), RECURRENT EPISODE, MILD: Chronic | ICD-10-CM

## 2023-01-19 DIAGNOSIS — N25.81 SECONDARY HYPERPARATHYROIDISM OF RENAL ORIGIN: Chronic | ICD-10-CM

## 2023-01-19 PROBLEM — D61.9 APLASTIC ANEMIA: Chronic | Status: RESOLVED | Noted: 2019-05-27 | Resolved: 2023-01-19

## 2023-01-19 PROCEDURE — 3008F PR BODY MASS INDEX (BMI) DOCUMENTED: ICD-10-PCS | Mod: CPTII,S$GLB,, | Performed by: FAMILY MEDICINE

## 2023-01-19 PROCEDURE — 4010F PR ACE/ARB THEARPY RXD/TAKEN: ICD-10-PCS | Mod: CPTII,S$GLB,, | Performed by: FAMILY MEDICINE

## 2023-01-19 PROCEDURE — 1160F PR REVIEW ALL MEDS BY PRESCRIBER/CLIN PHARMACIST DOCUMENTED: ICD-10-PCS | Mod: CPTII,S$GLB,, | Performed by: FAMILY MEDICINE

## 2023-01-19 PROCEDURE — 99214 PR OFFICE/OUTPT VISIT, EST, LEVL IV, 30-39 MIN: ICD-10-PCS | Mod: S$GLB,,, | Performed by: FAMILY MEDICINE

## 2023-01-19 PROCEDURE — 3074F SYST BP LT 130 MM HG: CPT | Mod: CPTII,S$GLB,, | Performed by: FAMILY MEDICINE

## 2023-01-19 PROCEDURE — 3061F NEG MICROALBUMINURIA REV: CPT | Mod: CPTII,S$GLB,, | Performed by: FAMILY MEDICINE

## 2023-01-19 PROCEDURE — 99999 PR PBB SHADOW E&M-EST. PATIENT-LVL III: CPT | Mod: PBBFAC,,, | Performed by: FAMILY MEDICINE

## 2023-01-19 PROCEDURE — 3066F NEPHROPATHY DOC TX: CPT | Mod: CPTII,S$GLB,, | Performed by: FAMILY MEDICINE

## 2023-01-19 PROCEDURE — 3078F DIAST BP <80 MM HG: CPT | Mod: CPTII,S$GLB,, | Performed by: FAMILY MEDICINE

## 2023-01-19 PROCEDURE — 1159F MED LIST DOCD IN RCRD: CPT | Mod: CPTII,S$GLB,, | Performed by: FAMILY MEDICINE

## 2023-01-19 PROCEDURE — 1160F RVW MEDS BY RX/DR IN RCRD: CPT | Mod: CPTII,S$GLB,, | Performed by: FAMILY MEDICINE

## 2023-01-19 PROCEDURE — 99214 OFFICE O/P EST MOD 30 MIN: CPT | Mod: S$GLB,,, | Performed by: FAMILY MEDICINE

## 2023-01-19 PROCEDURE — 3078F PR MOST RECENT DIASTOLIC BLOOD PRESSURE < 80 MM HG: ICD-10-PCS | Mod: CPTII,S$GLB,, | Performed by: FAMILY MEDICINE

## 2023-01-19 PROCEDURE — 3066F PR DOCUMENTATION OF TREATMENT FOR NEPHROPATHY: ICD-10-PCS | Mod: CPTII,S$GLB,, | Performed by: FAMILY MEDICINE

## 2023-01-19 PROCEDURE — 99499 UNLISTED E&M SERVICE: CPT | Mod: S$GLB,,, | Performed by: FAMILY MEDICINE

## 2023-01-19 PROCEDURE — 99999 PR PBB SHADOW E&M-EST. PATIENT-LVL III: ICD-10-PCS | Mod: PBBFAC,,, | Performed by: FAMILY MEDICINE

## 2023-01-19 PROCEDURE — 3051F HG A1C>EQUAL 7.0%<8.0%: CPT | Mod: CPTII,S$GLB,, | Performed by: FAMILY MEDICINE

## 2023-01-19 PROCEDURE — 3051F PR MOST RECENT HEMOGLOBIN A1C LEVEL 7.0 - < 8.0%: ICD-10-PCS | Mod: CPTII,S$GLB,, | Performed by: FAMILY MEDICINE

## 2023-01-19 PROCEDURE — 3008F BODY MASS INDEX DOCD: CPT | Mod: CPTII,S$GLB,, | Performed by: FAMILY MEDICINE

## 2023-01-19 PROCEDURE — 3074F PR MOST RECENT SYSTOLIC BLOOD PRESSURE < 130 MM HG: ICD-10-PCS | Mod: CPTII,S$GLB,, | Performed by: FAMILY MEDICINE

## 2023-01-19 PROCEDURE — 4010F ACE/ARB THERAPY RXD/TAKEN: CPT | Mod: CPTII,S$GLB,, | Performed by: FAMILY MEDICINE

## 2023-01-19 PROCEDURE — 3061F PR NEG MICROALBUMINURIA RESULT DOCUMENTED/REVIEW: ICD-10-PCS | Mod: CPTII,S$GLB,, | Performed by: FAMILY MEDICINE

## 2023-01-19 PROCEDURE — 1159F PR MEDICATION LIST DOCUMENTED IN MEDICAL RECORD: ICD-10-PCS | Mod: CPTII,S$GLB,, | Performed by: FAMILY MEDICINE

## 2023-01-19 PROCEDURE — 99499 RISK ADDL DX/OHS AUDIT: ICD-10-PCS | Mod: S$GLB,,, | Performed by: FAMILY MEDICINE

## 2023-01-19 NOTE — ASSESSMENT & PLAN NOTE
She remains on rosuvastatin 20 mg.  Lipids have improved but they are not ideal with very low HDL and elevated triglycerides.  I recommend continuing to work on healthy diet and exercise.

## 2023-01-19 NOTE — PROGRESS NOTES
THIS DOCUMENT WAS MADE IN PART WITH VOICE RECOGNITION SOFTWARE.  OCCASIONALLY THIS SOFTWARE WILL MISINTERPRET WORDS OR PHRASES.      Primary Care Provider Appointment   [facility] ([facility])  [address], [place] [address]   Ph: [phone]  Fax: [phone]      Patient ID: Andra Newell is a 62 y.o. female.    ASSESSMENT/PLAN by Problem List:  Problem List Items Addressed This Visit       Kidney transplant recipient (Chronic)     I reviewed I reviewed recent labs and consultation notes.  She is stable.  She does state she is being released from the transplant team and request that I monitor CBC, chemistries and tacrolimus level every three months.         Relevant Orders    CBC Auto Differential    Comprehensive Metabolic Panel    Long-term use of immunosuppressant medication - Primary (Chronic)     Stable continue to follow with the transplant clinic         Relevant Orders    TACROLIMUS LEVEL    Immunosuppression (Chronic)     Secondary to kidney transplant and medications.  She is stable.         Controlled type 2 diabetes mellitus with complication, with long-term current use of insulin (Chronic)     Diabetes improved with recent A1c at 7.5%.  Continue current medications         Relevant Orders    Hemoglobin A1C    Comprehensive Metabolic Panel    Mixed diabetic hyperlipidemia associated with type 2 diabetes mellitus (Chronic)     She remains on rosuvastatin 20 mg.  Lipids have improved but they are not ideal with very low HDL and elevated triglycerides.  I recommend continuing to work on healthy diet and exercise.         Secondary hyperparathyroidism of renal origin (Chronic)     Stable, she is status post transplant and continues to follow with Nephrology         Stage 3a chronic kidney disease (Chronic)     Status post renal transplant with current estimated GFR between 50 and 60.  She will continue to follow with Nephrology         Mild  intermittent asthma without complication (Chronic)     Stable no recent exacerbation         Acquired hypothyroidism (Chronic)     Stable, TSH has been satisfactory.  Continue current medications         Anemia of chronic disease (Chronic)     Stable CBC.  Will continue to monitor         MDD (major depressive disorder), recurrent episode, mild (Chronic)     She appears to be doing well.  Continue current medications         Benzodiazepine dependence, continuous (Chronic)     Intermittent use of lorazepam, stable         Aortic atherosclerosis     Other Visit Diagnoses       Type 2 diabetes mellitus with stage 5 chronic kidney disease not on chronic dialysis, with long-term current use of insulin        Encounter for screening mammogram for malignant neoplasm of breast        Relevant Orders    Mammo Digital Screening Bilat w/ Ludwig            Follow Up:  Three months      Health Maintenance         Date Due Completion Date    Shingles Vaccine (1 of 2) Never done ---    COVID-19 Vaccine (3 - Booster for Pfizer series) 09/08/2021 7/14/2021    Eye Exam 11/18/2021 11/18/2020    Mammogram 08/05/2022 8/5/2021    Foot Exam 03/31/2023 3/31/2022    Override on 10/2/2019: Done    Hemoglobin A1c 07/04/2023 1/4/2023    Lipid Panel 08/31/2023 8/31/2022    Cervical Cancer Screening 09/04/2023 9/4/2018    Diabetes Urine Screening 01/04/2024 1/4/2023    High Dose Statin 01/11/2024 1/11/2023    Colorectal Cancer Screening 05/09/2024 5/9/2019    TETANUS VACCINE 07/12/2027 7/12/2017          Subjective:     Chief Complaint   Patient presents with    Follow-up     Routine f/u     I have reviewed the information entered by the ancillary staff regarding the chief complaint as well as the related history.    HPI    Patient is a/an 62 y.o.  female   RISK of ADMIT/ED: 45    Routine follow-up for diabetes and other.  See above for all details addressed today    For complete problem list, past medical history, surgical history, social  "history, etc., see appropriate section in the electronic medical record    Review of Systems   Constitutional:  Negative for fever and unexpected weight change.   Respiratory:  Negative for shortness of breath.    Cardiovascular:  Negative for chest pain and leg swelling.   Musculoskeletal:  Positive for arthralgias.   Neurological:  Positive for weakness. Negative for syncope and speech difficulty.     Objective     Physical Exam  HENT:      Head: Normocephalic and atraumatic.      Comments: No visible trauma or bruising     Mouth/Throat:      Mouth: Mucous membranes are moist.      Pharynx: No oropharyngeal exudate or posterior oropharyngeal erythema.   Eyes:      General: No scleral icterus.     Conjunctiva/sclera: Conjunctivae normal.   Cardiovascular:      Rate and Rhythm: Normal rate and regular rhythm.      Heart sounds: Murmur heard.   Systolic murmur is present with a grade of 2/6.     No friction rub.      Comments: There is no peripheral edema.  Pulmonary:      Effort: Pulmonary effort is normal. No respiratory distress.      Breath sounds: Normal breath sounds. No wheezing or rales.   Neurological:      General: No focal deficit present.   Psychiatric:         Mood and Affect: Mood normal.     Vitals:    01/19/23 1310   BP: 126/60   BP Location: Left arm   Patient Position: Sitting   BP Method: Large (Manual)   Pulse: 72   Resp: 20   SpO2: 97%   Weight: 94.7 kg (208 lb 10.7 oz)   Height: 5' 7" (1.702 m)       RECENT LABS:    Lab Results   Component Value Date    WBC 6.47 01/04/2023    HGB 11.5 (L) 01/04/2023    HCT 36.8 (L) 01/04/2023     01/04/2023    CHOL 156 08/31/2022    TRIG 198 (H) 08/31/2022    HDL 20 (L) 08/31/2022    ALT 53 (H) 01/04/2023    AST 57 (H) 01/04/2023     01/04/2023    K 4.9 01/11/2023     01/04/2023    CREATININE 1.0 01/04/2023    BUN 18 01/04/2023    CO2 29 01/04/2023    TSH 0.934 05/19/2022    INR 1.1 09/13/2022    HGBA1C 7.5 (H) 01/04/2023       Results for " orders placed or performed in visit on 01/11/23   Potassium   Result Value Ref Range    Potassium 4.9 3.5 - 5.1 mmol/L     *Note: Due to a large number of results and/or encounters for the requested time period, some results have not been displayed. A complete set of results can be found in Results Review.

## 2023-01-19 NOTE — ASSESSMENT & PLAN NOTE
I reviewed I reviewed recent labs and consultation notes.  She is stable.  She does state she is being released from the transplant team and request that I monitor CBC, chemistries and tacrolimus level every three months.

## 2023-01-19 NOTE — TELEPHONE ENCOUNTER
----- Message from Oumar Newsome sent at 1/19/2023 12:05 PM CST -----  Regarding: Procedure  Contact: ANDRA DUKE [[id number]]  Type: Needs Medical Advice    Who Called:  Andra    Symptoms (please be specific):  na    How long has patient had these symptoms:  richard    Pharmacy name and phone #:  na    Best Call Back Number: [phone]     Additional Information: Pt needs to know if she still needs her appt since she is not having the procedure done. Please call to advise.

## 2023-01-19 NOTE — ASSESSMENT & PLAN NOTE
Status post renal transplant with current estimated GFR between 50 and 60.  She will continue to follow with Nephrology

## 2023-01-20 ENCOUNTER — HOSPITAL ENCOUNTER (OUTPATIENT)
Dept: RADIOLOGY | Facility: HOSPITAL | Age: 63
Discharge: HOME | End: 2023-01-20
Attending: INTERNAL MEDICINE
Payer: MEDICARE

## 2023-01-20 DIAGNOSIS — Z94.0 STATUS POST LIVING-DONOR KIDNEY TRANSPLANTATION: ICD-10-CM

## 2023-01-20 DIAGNOSIS — R10.31 RIGHT LOWER QUADRANT ABDOMINAL PAIN: ICD-10-CM

## 2023-01-20 PROCEDURE — 76776 US EXAM K TRANSPL W/DOPPLER: CPT | Mod: TC,PO

## 2023-01-20 PROCEDURE — 76776 US TRANSPLANT KIDNEY WITH DOPPLER: ICD-10-PCS | Mod: 26,,, | Performed by: RADIOLOGY

## 2023-01-20 PROCEDURE — 76776 US EXAM K TRANSPL W/DOPPLER: CPT | Mod: 26,,, | Performed by: RADIOLOGY

## 2023-01-21 ENCOUNTER — PATIENT MESSAGE (OUTPATIENT)
Dept: PRIMARY CARE CLINIC | Facility: CLINIC | Age: 63
End: 2023-01-21
Payer: MEDICARE

## 2023-01-23 RX ORDER — PEN NEEDLE, DIABETIC 30 GX3/16"
NEEDLE, DISPOSABLE MISCELLANEOUS
Qty: 100 EACH | Refills: 3 | Status: SHIPPED | OUTPATIENT
Start: 2023-01-23 | End: 2023-05-15

## 2023-01-24 ENCOUNTER — OFFICE VISIT (OUTPATIENT)
Dept: NEUROLOGY | Facility: CLINIC | Age: 63
End: 2023-01-24
Payer: MEDICARE

## 2023-01-24 VITALS
BODY MASS INDEX: 33.17 KG/M2 | DIASTOLIC BLOOD PRESSURE: 76 MMHG | SYSTOLIC BLOOD PRESSURE: 139 MMHG | HEIGHT: 67 IN | HEART RATE: 78 BPM | WEIGHT: 211.31 LBS

## 2023-01-24 DIAGNOSIS — R25.3 MUSCULAR FASCICULATION: ICD-10-CM

## 2023-01-24 DIAGNOSIS — R25.1 TREMOR: Primary | ICD-10-CM

## 2023-01-24 PROCEDURE — 99215 PR OFFICE/OUTPT VISIT, EST, LEVL V, 40-54 MIN: ICD-10-PCS | Mod: S$GLB,,, | Performed by: NURSE PRACTITIONER

## 2023-01-24 PROCEDURE — 4010F ACE/ARB THERAPY RXD/TAKEN: CPT | Mod: CPTII,S$GLB,, | Performed by: NURSE PRACTITIONER

## 2023-01-24 PROCEDURE — 3078F PR MOST RECENT DIASTOLIC BLOOD PRESSURE < 80 MM HG: ICD-10-PCS | Mod: CPTII,S$GLB,, | Performed by: NURSE PRACTITIONER

## 2023-01-24 PROCEDURE — 3008F BODY MASS INDEX DOCD: CPT | Mod: CPTII,S$GLB,, | Performed by: NURSE PRACTITIONER

## 2023-01-24 PROCEDURE — 3075F SYST BP GE 130 - 139MM HG: CPT | Mod: CPTII,S$GLB,, | Performed by: NURSE PRACTITIONER

## 2023-01-24 PROCEDURE — 3078F DIAST BP <80 MM HG: CPT | Mod: CPTII,S$GLB,, | Performed by: NURSE PRACTITIONER

## 2023-01-24 PROCEDURE — 3051F HG A1C>EQUAL 7.0%<8.0%: CPT | Mod: CPTII,S$GLB,, | Performed by: NURSE PRACTITIONER

## 2023-01-24 PROCEDURE — 1159F PR MEDICATION LIST DOCUMENTED IN MEDICAL RECORD: ICD-10-PCS | Mod: CPTII,S$GLB,, | Performed by: NURSE PRACTITIONER

## 2023-01-24 PROCEDURE — 3066F NEPHROPATHY DOC TX: CPT | Mod: CPTII,S$GLB,, | Performed by: NURSE PRACTITIONER

## 2023-01-24 PROCEDURE — 3061F NEG MICROALBUMINURIA REV: CPT | Mod: CPTII,S$GLB,, | Performed by: NURSE PRACTITIONER

## 2023-01-24 PROCEDURE — 99215 OFFICE O/P EST HI 40 MIN: CPT | Mod: S$GLB,,, | Performed by: NURSE PRACTITIONER

## 2023-01-24 PROCEDURE — 3051F PR MOST RECENT HEMOGLOBIN A1C LEVEL 7.0 - < 8.0%: ICD-10-PCS | Mod: CPTII,S$GLB,, | Performed by: NURSE PRACTITIONER

## 2023-01-24 PROCEDURE — 1160F RVW MEDS BY RX/DR IN RCRD: CPT | Mod: CPTII,S$GLB,, | Performed by: NURSE PRACTITIONER

## 2023-01-24 PROCEDURE — 3061F PR NEG MICROALBUMINURIA RESULT DOCUMENTED/REVIEW: ICD-10-PCS | Mod: CPTII,S$GLB,, | Performed by: NURSE PRACTITIONER

## 2023-01-24 PROCEDURE — 99999 PR PBB SHADOW E&M-EST. PATIENT-LVL V: ICD-10-PCS | Mod: PBBFAC,,, | Performed by: NURSE PRACTITIONER

## 2023-01-24 PROCEDURE — 3066F PR DOCUMENTATION OF TREATMENT FOR NEPHROPATHY: ICD-10-PCS | Mod: CPTII,S$GLB,, | Performed by: NURSE PRACTITIONER

## 2023-01-24 PROCEDURE — 1159F MED LIST DOCD IN RCRD: CPT | Mod: CPTII,S$GLB,, | Performed by: NURSE PRACTITIONER

## 2023-01-24 PROCEDURE — 99999 PR PBB SHADOW E&M-EST. PATIENT-LVL V: CPT | Mod: PBBFAC,,, | Performed by: NURSE PRACTITIONER

## 2023-01-24 PROCEDURE — 1160F PR REVIEW ALL MEDS BY PRESCRIBER/CLIN PHARMACIST DOCUMENTED: ICD-10-PCS | Mod: CPTII,S$GLB,, | Performed by: NURSE PRACTITIONER

## 2023-01-24 PROCEDURE — 3075F PR MOST RECENT SYSTOLIC BLOOD PRESS GE 130-139MM HG: ICD-10-PCS | Mod: CPTII,S$GLB,, | Performed by: NURSE PRACTITIONER

## 2023-01-24 PROCEDURE — 3008F PR BODY MASS INDEX (BMI) DOCUMENTED: ICD-10-PCS | Mod: CPTII,S$GLB,, | Performed by: NURSE PRACTITIONER

## 2023-01-24 PROCEDURE — 4010F PR ACE/ARB THEARPY RXD/TAKEN: ICD-10-PCS | Mod: CPTII,S$GLB,, | Performed by: NURSE PRACTITIONER

## 2023-01-24 NOTE — ASSESSMENT & PLAN NOTE
Patient is a 61 y/o female that presents for evaluation of her tremor. It is to both hands (L>R) with onset nearly 1 year ago.   The tremor is more noticeable with action (eating, driving) but can also occur at rest.   Suspect the cause of her tremors are multifactorial   - h/o kidney dz and transplant   - anxiety    - medication induced (tacrolimus)  Neuro exam with abnormal FTN testing to left hand. No resting tremor noted.   Recent serologies noted  Discussed medication options with the patient and resolution rate   - can consider trying Propranolol or Primidone in the future. Pt would like to hold on medication for now  Recommend conservative measures and tracking tremors  
Previously evaluated by Dr. Neri   suggestive of a radiculopathy or focal neuropathy.   As per EMR, fasciculations likely from nerve root inflammation or compression at S1 level  Pt is not interested in getting EMG/NCS  
16-Jul-2019
none

## 2023-01-24 NOTE — PATIENT INSTRUCTIONS
"Tips for surviving essential tremor  Here are some ways to minimize the effects of tremors on your daily life:    Avoid caffeine; it can aggravate symptoms.  Use mugs with lids and straws for beverages and soups.  Use a smart phone or small tape recorder to take and play back notes, including your grocery list.  To control head tremor, turn your head to the side.  To reduce tremors when you're using your hands, hold your elbows close to your body.  When you apply makeup, rest your elbows on a table or countertop.  Use salon services for manicures and eyebrow care.  Adopt a hairstyle that requires a minimum of fuss.  Use debit or credit cards instead of writing checks.  At a restaurant, ask the  to serve foods such as meat in bite-sized pieces.          ------------------------------------------------------------------------------  What is tremor?   -- Tremor is the medical term for trembling or shaking. A person with tremor has a body part that shakes, and the person cannot control the shaking. Most often this shaking affects the hands or the head, but other body parts can be affected, too. The tremor can be a problem on its own, or it can be caused by another health problem.  There are several different types of tremor. They fall into several main groups:  ?Rest tremors - Rest tremors happen while you are sitting or lying down and relaxed. People who have a rest tremor can usually stop the tremor by making a point of moving the part of their body that shakes.  ?Action tremors - Action tremors happen when you are moving your muscles on purpose. There are a few different kinds of action tremors, including:  Kinetic tremors - These happen when you move on purpose, such as writing or drinking from a cup. Sometimes, the tremor gets worse gradually as you get closer to what you trying to do or reach. This is called "intention" tremor.  Postural tremors - These happen when you try to hold a body part still in a " "position other than its resting position. For example, your legs might shake when you are standing up, or your arms might shake if you hold them out in front of you.   Isometric tremors - These happen when you move a muscle against something that is still. For example, they might happen when you push against a wall or make a fist with your hand.  ?Functional tremor - Functional tremor can combine features of rest and action tremors. Unlike other kinds of tremor, functional tremor has no known medical cause. This kind of tremor usually gets less severe if you are distracted while your doctor examines you, for example, if they ask you to do something else with another part of your body. Other types of tremor tend to get worse with distraction.     What are the most common causes of rest tremor?   -- The most common cause of rest tremor is Parkinson disease. If that is the cause of your tremor, your doctor or nurse will probably focus on treating your Parkinson disease. This will hopefully help reduce your tremor.  Other problems that can cause rest tremors include diseases that damage parts of the brain, and a rare condition called Keith disease, which causes copper to build up in the body.    What are the most common causes of action tremor?   -- The most common cause is something called a "physiologic" tremor. Everyone, even people who are healthy, has a little bit of shaking of the hands. This is what doctors refer to as "physiologic tremor." It is normal, and you don't usually notice it, because it is very mild. But in some cases this "physiologic" or normal tremor can become exaggerated. This can happen:  ?If you take certain medicines, such as those used to treat depression, or asthma and other breathing problems  ?If you drink coffee, smoke cigarettes, or use "stimulants" (including caffeine and certain medicines)  ?If you are anxious, excited, or afraid  ?If your muscles are very tired, for example because " "you just worked out  ?As the effects of alcohol or other drugs are wearing off  ?If you have an overactive thyroid gland  ?If you have a fever  If your tremor is caused by 1 of the problems listed above, the tremor should go away as soon as the problem goes away. Of course, if your tremor is caused by a medicine, you might not be able to stop taking it. But it might be possible to switch medicines or to lower the dose.    What is essential tremor?   -- Essential tremor is a nervous system problem that causes action tremor. It is different from physiologic tremor in that it is not related to medicines, substances, or physical conditions such as fever. Essential tremor can be passed on in families.  People who have essential tremor usually shake when they try to hold their arms out straight. They also tend to shake when they move their hands with a goal in mind. For instance, their hands might shake when they try to write, drink from a glass, or touch their nose with their finger.  Essential tremor sometimes even affects the head. This makes it look as though the person is nodding their head "yes-yes" or shaking their head "no-no."    Is there a test to find out the cause of tremor?   -- No, there is no test. But your doctor or nurse can learn about a lot about your tremor just by asking you questions and watching you move. Your doctor or nurse might send you for a brain scan or blood tests to make sure your tremor is not caused by something serious. But it's likely that they will be able to tell what's wrong just by doing an exam.  How is tremor treated? -- If a tremor is caused by another medical problem, treating that problem - if it can be treated - sometimes helps reduce the tremor, too. For example, people whose tremor is caused by high thyroid hormone levels often stop shaking when their hormone levels go back to normal.  Even when no other medical problems are involved, there are treatments that can help. " There are a few medicines that can reduce a person's tremor. If the medicines are not effective enough and the tremor is severe, it is even possible to have a device implanted in the brain that can help control tremor.

## 2023-01-24 NOTE — PROGRESS NOTES
"  NEUROLOGY  Outpatient Follow Up    [facility]  [address] [facility], [place] [address]  [phone] (office) / [phone] (fax)    Patient Name:  Andra Newell  :  1960  MR #:  [id number]  Acct #:  [id number]    Date of Neurology Visit: 2023  Name of Provider: JANET Mena    Other Physicians:  Fady Vaughn MD (Primary Care Physician); No ref. provider found (Referring)      Chief Complaint: Tremors      History of Present Illness (HPI):  Prior HPI  2022 (Dr. Neri)  "Andra Newell is a 62 y.o. female here for evaluation of muscle fasciculations.     Patient states that she has movement in the muscles of the left calf more than the right.  This hs been present for a few months.  She has had multiple surgeries on the left leg including knee and ankle surgery (achilles no longer attached).  She is having trouble walking on the left leg due to her ankle and knee.  There is no pain in the calf.  There is numbness in the left lower leg.  She has back pain.  She has a pain pump that dispenses fentanyl.  This was placed 3-4 months ago (Dr. Nunez).  She does not know if she can have an MRI with this.     She has a history of lumbar spine surgery prior to her last 2019 MRI lumbar spine. She thinks she has a more recent MRI lumbar spine at [facility] and will look for this.     She has not had an EMG before for this."          Interval Hx 2023:   Patient is new to me. Patient has a history of kidney transplant in 2019 (Prograf), depression, PTSD, anxiety, MI, stents, spine history.  Patient is here today for tremor. She states her tremor is to both hands (L>R) and started in the summer of . Its worse when eating or performing an action. It can occur at rest but not as prominent. There is no known family history of tremor. She does not drink alcohol. She will have a 8 oz cup of coffee every once in a while. She also drinks iced tea about half " gallon in a weeks time. She reports that her handwriting is affected in that its hard for her to print her name straight. The tremors do not affect her ADLS but are bothersome when eating.           Past Medical, Surgical, Family & Social History:   Reviewed and updated.    Home Medications:     Current Outpatient Medications:     acetaminophen (TYLENOL) 325 MG tablet, Take 2 tablets (650 mg total) by mouth every 8 (eight) hours as needed for Pain., Disp: , Rfl: 0    aspirin (ECOTRIN) 81 MG EC tablet, Take 1 tablet (81 mg total) by mouth once daily., Disp: 30 tablet, Rfl: 0    clopidogreL (PLAVIX) 75 mg tablet, Take 1 tablet (75 mg total) by mouth once daily., Disp: 90 tablet, Rfl: 1    colchicine (COLCRYS) 0.6 mg tablet, Take 1 tablet (0.6 mg total) by mouth 2 (two) times daily., Disp: 180 tablet, Rfl: 3    famotidine (PEPCID) 40 MG tablet, Take 1 tablet (40 mg total) by mouth every evening., Disp: 30 tablet, Rfl: 6    flash glucose sensor (FREESTYLE DAKOTAH 14 DAY SENSOR) Kit, Use 1 sensor every 14 days to check blood glucose, Disp: 6 kit, Rfl: 3    gabapentin (NEURONTIN) 100 MG capsule, Take 100 mg by mouth., Disp: , Rfl:     insulin aspart U-100 (NOVOLOG FLEXPEN U-100 INSULIN) 100 unit/mL (3 mL) InPn pen, Pt is taking 32 units with meals plus ss for a max dose of 150 units a day., Disp: 45 mL, Rfl: 11    insulin degludec (TRESIBA FLEXTOUCH U-200) 200 unit/mL (3 mL) insulin pen, Inject 80 Units into the skin once daily., Disp: 12 pen, Rfl: 3    isosorbide mononitrate (IMDUR) 30 MG 24 hr tablet, TAKE ONE TABLET BY MOUTH DAILY, Disp: 30 tablet, Rfl: 1    levothyroxine (SYNTHROID) 75 MCG tablet, TAKE ONE TABLET BY MOUTH ONCE DAILY, Disp: 90 tablet, Rfl: 3    LIDOcaine (LIDODERM) 5 %, 1 patch., Disp: , Rfl:     LORazepam (ATIVAN) 1 MG tablet, Take 1 mg by mouth every evening., Disp: , Rfl:     losartan (COZAAR) 50 MG tablet, Take 1 tablet (50 mg total) by mouth once daily., Disp: 90 tablet, Rfl: 3    magnesium oxide  "500 mg Tab, Take 500 mg by mouth once daily., Disp: , Rfl: 0    metFORMIN (GLUCOPHAGE-XR) 500 MG ER 24hr tablet, TAKE ONE TABLET BY MOUTH TWICE DAILY WITH FOOD, Disp: 180 tablet, Rfl: 1    metoprolol tartrate (LOPRESSOR) 25 MG tablet, Take 0.5 tablets (12.5 mg total) by mouth 2 (two) times daily., Disp: 30 tablet, Rfl: 1    multivitamin (THERAGRAN) tablet, Take 1 tablet by mouth once daily., Disp: , Rfl:     nitroGLYCERIN (NITROSTAT) 0.4 MG SL tablet, Place 1 tablet (0.4 mg total) under the tongue every 5 (five) minutes as needed for Chest pain., Disp: 20 tablet, Rfl: 1    pen needle, diabetic 31 gauge x 5/16" Ndle, Use a directed, Disp: 100 each, Rfl: 3    rosuvastatin (CRESTOR) 20 MG tablet, Take 1 tablet (20 mg total) by mouth every evening., Disp: 90 tablet, Rfl: 1    sucralfate (CARAFATE) 1 gram tablet, Take 1 tablet (1 g total) by mouth every 6 (six) hours as needed (GERD)., Disp: 40 tablet, Rfl: 1    tacrolimus (PROGRAF) 1 MG Cap, Take 1 capsule (1 mg total) by mouth every 12 (twelve) hours., Disp: 60 capsule, Rfl: 11    tiZANidine (ZANAFLEX) 4 MG tablet, Take 1 tablet by mouth every evening., Disp: , Rfl:     vilazodone (VIIBRYD) 40 mg Tab tablet, Take 1 tablet (40 mg total) by mouth once daily., Disp: 30 tablet, Rfl: 4    omeprazole (PRILOSEC) 40 MG capsule, Take 1 capsule (40 mg total) by mouth 2 (two) times daily before meals for 14 days, THEN 1 capsule (40 mg total) every morning., Disp: 90 capsule, Rfl: 3    Physical Examination:  /76 (BP Location: Left arm, Patient Position: Sitting, BP Method: Medium (Automatic))   Pulse 78   Ht 5' 7" (1.702 m)   Wt 95.8 kg (211 lb 5 oz)   LMP 02/28/2012   BMI 33.10 kg/m²       GENERAL:  General appearance: Well, non-toxic appearing.  No apparent distress.  Neck: supple.    MENTAL STATUS:  Alertness, attention span & concentration: normal.  Language: normal.  Orientation to self, place & time:  normal.  Memory, recent & remote: normal.  Fund of knowledge: " normal.      SPEECH:  Clear and fluent.  Follows complex commands.    CRANIAL NERVES:  Cranial Nerves II-XII were examined.  II - Visual fields: normal.  III, IV, VI: PERRL, EOMI, No ptosis, No nystagmus.  V - Facial sensation: normal.  VII - Face symmetry & mobility: normal.  VIII - Hearing: normal  IX, X - Palate: mobile & midline.  XI - Shoulder shrug: normal.  XII - Tongue protrusion: normal.      GROSS MOTOR:  Gait & station: antalgic gait; good arm swing and step height; 1 step turn  Tone: no cogwheel but unable to relax left arm fully   Arms to core: mild tremor to left hand  Arms extended: no tremor  Left arm extended with weight object: tremor noted  Right arm extended with weighted object: very mild tremor  Abnormal movements: none.  Finger-nose: mild tremor to left hand  Heel-knee-shin: normal.  Rapid alternating movements: normal.  Pronator drift: normal      MUSCLE STRENGTH:   Hand grasp:   - right:5/5   - left:5/5    RIGHT    LEFT   5 Neck Ext. 5   5 Neck Flex 5   4+ Deltoids 4+   5 Sh.Ext.Rot. 5   5 Sh.Int.Rot. 5   5 Biceps 5   5 Triceps 5   5 Forearm.Pr. 5        5 Iliopsoas flex    4+   5 Hip Abduct 4+   5 Hip Adduct 4+   5 Quads 5   5 Hams 5   5 Dorsiflex 5   5 Plantar Flex 5          REFLEXES:    RIGHT Reflex   LEFT   1 Biceps 1   1 Brachiorad. 1        1 Patellar 1      SENSORY:  Light touch: Normal throughout.  Vibration: Normal throughout.             Diagnostic Data Reviewed:        Lab Results   Component Value Date    WBC 6.47 01/04/2023    HGB 11.5 (L) 01/04/2023    HCT 36.8 (L) 01/04/2023    MCV 89 01/04/2023     01/04/2023        CMP  Sodium   Date Value Ref Range Status   01/04/2023 143 136 - 145 mmol/L Final     Potassium   Date Value Ref Range Status   01/11/2023 4.9 3.5 - 5.1 mmol/L Final     Chloride   Date Value Ref Range Status   01/04/2023 105 95 - 110 mmol/L Final     CO2   Date Value Ref Range Status   01/04/2023 29 23 - 29 mmol/L Final     Glucose   Date Value Ref Range  Status   01/04/2023 179 (H) 70 - 110 mg/dL Final     BUN   Date Value Ref Range Status   01/04/2023 18 8 - 23 mg/dL Final     Creatinine   Date Value Ref Range Status   01/04/2023 1.0 0.5 - 1.4 mg/dL Final     Calcium   Date Value Ref Range Status   01/04/2023 10.2 8.7 - 10.5 mg/dL Final     Total Protein   Date Value Ref Range Status   01/04/2023 7.3 6.0 - 8.4 g/dL Final     Albumin   Date Value Ref Range Status   01/04/2023 3.8 3.5 - 5.2 g/dL Final   01/04/2023 3.8 3.5 - 5.2 g/dL Final     Total Bilirubin   Date Value Ref Range Status   01/04/2023 0.4 0.1 - 1.0 mg/dL Final     Comment:     For infants and newborns, interpretation of results should be based  on gestational age, weight and in agreement with clinical  observations.    Premature Infant recommended reference ranges:  Up to 24 hours.............<8.0 mg/dL  Up to 48 hours............<12.0 mg/dL  3-5 days..................<15.0 mg/dL  6-29 days.................<15.0 mg/dL       Alkaline Phosphatase   Date Value Ref Range Status   01/04/2023 122 55 - 135 U/L Final     AST   Date Value Ref Range Status   01/04/2023 57 (H) 10 - 40 U/L Final     ALT   Date Value Ref Range Status   01/04/2023 53 (H) 10 - 44 U/L Final     Anion Gap   Date Value Ref Range Status   01/04/2023 9 8 - 16 mmol/L Final     eGFR   Date Value Ref Range Status   01/04/2023 >60.0 >60 mL/min/1.73 m^2 Final             Assessment and Plan:      Problem List Items Addressed This Visit          Neuro    Muscular fasciculation    Current Assessment & Plan     Previously evaluated by Dr. Neri   suggestive of a radiculopathy or focal neuropathy.   As per EMR, fasciculations likely from nerve root inflammation or compression at S1 level  Pt is not interested in getting EMG/NCS         Tremor - Primary    Current Assessment & Plan     Patient is a 61 y/o female that presents for evaluation of her tremor. It is to both hands (L>R) with onset nearly 1 year ago.   The tremor is more noticeable  with action (eating, driving) but can also occur at rest.   Suspect the cause of her tremors are multifactorial   - h/o kidney dz and transplant   - anxiety    - medication induced (tacrolimus)  Neuro exam with abnormal FTN testing to left hand. No resting tremor noted.   Recent serologies noted  Discussed medication options with the patient and resolution rate   - can consider trying Propranolol or Primidone in the future. Pt would like to hold on medication for now  Recommend conservative measures and tracking tremors                            Important to note, also  has a past medical history of Anemia, Anemia in chronic kidney disease, on chronic dialysis (7/12/2017), Anxiety and depression, Aplastic anemia with parvovirus B19 infection (5/27/2019), Aplastic anemia with parvovirus B19 infection 4/2019 (5/27/2019), Arthritis, Asthma, CKD stage III (moderate) (2/18/2019), Colon polyp, Depression, Diabetes mellitus, Diverticulosis, Encounter for blood transfusion, Eosinophilia, ESRD (end stage renal disease), ESRD on dialysis, GERD (gastroesophageal reflux disease), Gout, Gout, Hyperlipidemia, Hypertension, Hypertriglyceridemia without hypercholesterolemia (6/9/2019), Low back pain, Low HDL (under 40) (6/9/2019), MRSA carrier, Neutropenia, unspecified (5/8/2019), Obesity, Renal disorder, Rotator cuff syndrome--left, Thyroid disease, Ulnar neuropathy of right upper extremity, and Uncontrolled type 2 diabetes mellitus with hyperglycemia.          The patient will return to clinic in 4-6 months.    All questions were answered and patient is comfortable with the plan.         Thank you very much for the opportunity to assist in this patient's care.    If you have any questions or concerns, please do not hesitate to contact me at any time.      Sincerely,     JANET Mena  [facility]         I spent a total of 65 minutes on the day of the visit.This includes face to face time  and non-face to face time preparing to see the patient (eg, review of tests), Obtaining and/or reviewing separately obtained history, Documenting clinical information in the electronic or other health record, Independently interpreting resultsand communicating results to the patient/family/caregiver, or Care coordination.

## 2023-02-01 ENCOUNTER — PATIENT MESSAGE (OUTPATIENT)
Dept: PRIMARY CARE CLINIC | Facility: CLINIC | Age: 63
End: 2023-02-01
Payer: MEDICARE

## 2023-02-01 RX ORDER — LANCETS
EACH MISCELLANEOUS
Qty: 300 EACH | Refills: 3 | Status: SHIPPED | OUTPATIENT
Start: 2023-02-01

## 2023-02-06 ENCOUNTER — PATIENT MESSAGE (OUTPATIENT)
Dept: PRIMARY CARE CLINIC | Facility: CLINIC | Age: 63
End: 2023-02-06
Payer: MEDICARE

## 2023-02-06 DIAGNOSIS — R30.9 PAINFUL URINATION: Primary | ICD-10-CM

## 2023-02-06 NOTE — TELEPHONE ENCOUNTER
See my chart message.  I entered a referral to urology.  However I would like her to check a urine sample as soon as possible.  Orders entered.

## 2023-02-08 ENCOUNTER — OFFICE VISIT (OUTPATIENT)
Dept: CARDIOLOGY | Facility: CLINIC | Age: 63
End: 2023-02-08
Payer: MEDICARE

## 2023-02-08 VITALS
RESPIRATION RATE: 18 BRPM | HEART RATE: 100 BPM | SYSTOLIC BLOOD PRESSURE: 158 MMHG | HEIGHT: 67 IN | WEIGHT: 207.88 LBS | DIASTOLIC BLOOD PRESSURE: 78 MMHG | BODY MASS INDEX: 32.63 KG/M2

## 2023-02-08 DIAGNOSIS — E78.2 MIXED DIABETIC HYPERLIPIDEMIA ASSOCIATED WITH TYPE 2 DIABETES MELLITUS: Chronic | ICD-10-CM

## 2023-02-08 DIAGNOSIS — N18.31 STAGE 3A CHRONIC KIDNEY DISEASE: Chronic | ICD-10-CM

## 2023-02-08 DIAGNOSIS — D84.9 IMMUNOSUPPRESSION: Chronic | ICD-10-CM

## 2023-02-08 DIAGNOSIS — E66.9 OBESITY (BMI 30-39.9): Chronic | ICD-10-CM

## 2023-02-08 DIAGNOSIS — I12.9 HYPERTENSION ASSOCIATED WITH STAGE 3 CHRONIC KIDNEY DISEASE DUE TO TYPE 2 DIABETES MELLITUS: Chronic | ICD-10-CM

## 2023-02-08 DIAGNOSIS — E11.22 HYPERTENSION ASSOCIATED WITH STAGE 3 CHRONIC KIDNEY DISEASE DUE TO TYPE 2 DIABETES MELLITUS: Chronic | ICD-10-CM

## 2023-02-08 DIAGNOSIS — N18.30 HYPERTENSION ASSOCIATED WITH STAGE 3 CHRONIC KIDNEY DISEASE DUE TO TYPE 2 DIABETES MELLITUS: Chronic | ICD-10-CM

## 2023-02-08 DIAGNOSIS — I25.10 CORONARY ARTERY DISEASE INVOLVING NATIVE CORONARY ARTERY OF NATIVE HEART WITHOUT ANGINA PECTORIS: ICD-10-CM

## 2023-02-08 DIAGNOSIS — E03.9 ACQUIRED HYPOTHYROIDISM: Chronic | ICD-10-CM

## 2023-02-08 DIAGNOSIS — K76.0 NAFLD (NONALCOHOLIC FATTY LIVER DISEASE): Chronic | ICD-10-CM

## 2023-02-08 DIAGNOSIS — I31.9 PERICARDITIS, UNSPECIFIED CHRONICITY, UNSPECIFIED TYPE: ICD-10-CM

## 2023-02-08 DIAGNOSIS — I70.0 AORTIC ATHEROSCLEROSIS: ICD-10-CM

## 2023-02-08 DIAGNOSIS — E11.8 CONTROLLED TYPE 2 DIABETES MELLITUS WITH COMPLICATION, WITH LONG-TERM CURRENT USE OF INSULIN: Chronic | ICD-10-CM

## 2023-02-08 DIAGNOSIS — Z94.0 KIDNEY TRANSPLANT RECIPIENT: Chronic | ICD-10-CM

## 2023-02-08 DIAGNOSIS — R07.9 CHEST PAIN, UNSPECIFIED TYPE: Primary | ICD-10-CM

## 2023-02-08 DIAGNOSIS — Z79.4 CONTROLLED TYPE 2 DIABETES MELLITUS WITH COMPLICATION, WITH LONG-TERM CURRENT USE OF INSULIN: Chronic | ICD-10-CM

## 2023-02-08 DIAGNOSIS — E11.69 MIXED DIABETIC HYPERLIPIDEMIA ASSOCIATED WITH TYPE 2 DIABETES MELLITUS: Chronic | ICD-10-CM

## 2023-02-08 PROCEDURE — 93010 EKG 12-LEAD: ICD-10-PCS | Mod: S$GLB,,, | Performed by: INTERNAL MEDICINE

## 2023-02-08 PROCEDURE — 1159F MED LIST DOCD IN RCRD: CPT | Mod: CPTII,S$GLB,, | Performed by: PHYSICIAN ASSISTANT

## 2023-02-08 PROCEDURE — 3077F PR MOST RECENT SYSTOLIC BLOOD PRESSURE >= 140 MM HG: ICD-10-PCS | Mod: CPTII,S$GLB,, | Performed by: PHYSICIAN ASSISTANT

## 2023-02-08 PROCEDURE — 99999 PR PBB SHADOW E&M-EST. PATIENT-LVL II: CPT | Mod: PBBFAC,,, | Performed by: PHYSICIAN ASSISTANT

## 2023-02-08 PROCEDURE — 3078F DIAST BP <80 MM HG: CPT | Mod: CPTII,S$GLB,, | Performed by: PHYSICIAN ASSISTANT

## 2023-02-08 PROCEDURE — 3061F PR NEG MICROALBUMINURIA RESULT DOCUMENTED/REVIEW: ICD-10-PCS | Mod: CPTII,S$GLB,, | Performed by: PHYSICIAN ASSISTANT

## 2023-02-08 PROCEDURE — 99214 OFFICE O/P EST MOD 30 MIN: CPT | Mod: S$GLB,,, | Performed by: PHYSICIAN ASSISTANT

## 2023-02-08 PROCEDURE — 93010 ELECTROCARDIOGRAM REPORT: CPT | Mod: S$GLB,,, | Performed by: INTERNAL MEDICINE

## 2023-02-08 PROCEDURE — 99214 PR OFFICE/OUTPT VISIT, EST, LEVL IV, 30-39 MIN: ICD-10-PCS | Mod: S$GLB,,, | Performed by: PHYSICIAN ASSISTANT

## 2023-02-08 PROCEDURE — 3078F PR MOST RECENT DIASTOLIC BLOOD PRESSURE < 80 MM HG: ICD-10-PCS | Mod: CPTII,S$GLB,, | Performed by: PHYSICIAN ASSISTANT

## 2023-02-08 PROCEDURE — 93005 ELECTROCARDIOGRAM TRACING: CPT | Mod: PO

## 2023-02-08 PROCEDURE — 1159F PR MEDICATION LIST DOCUMENTED IN MEDICAL RECORD: ICD-10-PCS | Mod: CPTII,S$GLB,, | Performed by: PHYSICIAN ASSISTANT

## 2023-02-08 PROCEDURE — 3066F PR DOCUMENTATION OF TREATMENT FOR NEPHROPATHY: ICD-10-PCS | Mod: CPTII,S$GLB,, | Performed by: PHYSICIAN ASSISTANT

## 2023-02-08 PROCEDURE — 3077F SYST BP >= 140 MM HG: CPT | Mod: CPTII,S$GLB,, | Performed by: PHYSICIAN ASSISTANT

## 2023-02-08 PROCEDURE — 3051F HG A1C>EQUAL 7.0%<8.0%: CPT | Mod: CPTII,S$GLB,, | Performed by: PHYSICIAN ASSISTANT

## 2023-02-08 PROCEDURE — 3008F PR BODY MASS INDEX (BMI) DOCUMENTED: ICD-10-PCS | Mod: CPTII,S$GLB,, | Performed by: PHYSICIAN ASSISTANT

## 2023-02-08 PROCEDURE — 3066F NEPHROPATHY DOC TX: CPT | Mod: CPTII,S$GLB,, | Performed by: PHYSICIAN ASSISTANT

## 2023-02-08 PROCEDURE — 3008F BODY MASS INDEX DOCD: CPT | Mod: CPTII,S$GLB,, | Performed by: PHYSICIAN ASSISTANT

## 2023-02-08 PROCEDURE — 99999 PR PBB SHADOW E&M-EST. PATIENT-LVL II: ICD-10-PCS | Mod: PBBFAC,,, | Performed by: PHYSICIAN ASSISTANT

## 2023-02-08 PROCEDURE — 3061F NEG MICROALBUMINURIA REV: CPT | Mod: CPTII,S$GLB,, | Performed by: PHYSICIAN ASSISTANT

## 2023-02-08 PROCEDURE — 4010F PR ACE/ARB THEARPY RXD/TAKEN: ICD-10-PCS | Mod: CPTII,S$GLB,, | Performed by: PHYSICIAN ASSISTANT

## 2023-02-08 PROCEDURE — 3051F PR MOST RECENT HEMOGLOBIN A1C LEVEL 7.0 - < 8.0%: ICD-10-PCS | Mod: CPTII,S$GLB,, | Performed by: PHYSICIAN ASSISTANT

## 2023-02-08 PROCEDURE — 4010F ACE/ARB THERAPY RXD/TAKEN: CPT | Mod: CPTII,S$GLB,, | Performed by: PHYSICIAN ASSISTANT

## 2023-02-08 NOTE — PROGRESS NOTES
"Subjective:    Patient ID:  Andra Newell is a 62 y.o. female who presents for follow-up of CAD.      HPI  Ms. Newell is a pleasant lady who follows with Dr. Dang. She presents today with c/o chest discomfort. She reports retrosternal "heaviness" that awakened her from sleep multiple times last night. Each episode lasted about 5-10 minutes before spontaneously resolving. No associated symptoms, but she had some nausea this morning (without chest heaviness) and diaphoresis. She reports GI issues. Her GI doctor wanted to do an EGD, but wanted to wait 6 months post-PCI.     She also notes having more BOYER going up stairs for the past month.     Review of Systems   Constitutional: Positive for diaphoresis. Negative for chills, fever, weight gain and weight loss.   HENT:  Negative for sore throat.    Eyes:  Negative for blurred vision, vision loss in left eye, vision loss in right eye and visual disturbance.   Cardiovascular:  Positive for chest pain and dyspnea on exertion. Negative for claudication, leg swelling, near-syncope, orthopnea, palpitations, paroxysmal nocturnal dyspnea and syncope.   Respiratory:  Negative for cough, hemoptysis, shortness of breath, sputum production and wheezing.    Endocrine: Negative for cold intolerance and heat intolerance.   Hematologic/Lymphatic: Negative for adenopathy. Does not bruise/bleed easily.   Skin:  Negative for rash.   Musculoskeletal:  Negative for falls, muscle weakness and myalgias.   Gastrointestinal:  Positive for nausea. Negative for abdominal pain, change in bowel habit, constipation, diarrhea and melena.   Genitourinary:  Negative for bladder incontinence.   Neurological:  Negative for dizziness, focal weakness, headaches, light-headedness, numbness and weakness.   Psychiatric/Behavioral:  Negative for altered mental status.       Vitals:    02/08/23 1301   BP: (!) 158/78   Pulse: 100   Resp: 18   Weight: 94.3 kg (207 lb 14.3 oz)   Height: 5' 7" (1.702 " m)   Body mass index is 32.56 kg/m².    Objective:    Physical Exam  Constitutional:       General: She is not in acute distress.     Appearance: She is well-developed.   HENT:      Head: Normocephalic and atraumatic.   Eyes:      General: No scleral icterus.     Conjunctiva/sclera: Conjunctivae normal.      Pupils: Pupils are equal, round, and reactive to light.   Neck:      Vascular: No JVD.      Trachea: No tracheal deviation.   Cardiovascular:      Rate and Rhythm: Normal rate and regular rhythm.      Heart sounds: No murmur heard.    No friction rub. No gallop.   Pulmonary:      Effort: Pulmonary effort is normal. No respiratory distress.      Breath sounds: Normal breath sounds. No wheezing or rales.   Chest:      Chest wall: No tenderness.   Abdominal:      General: Bowel sounds are normal. There is no distension.      Palpations: Abdomen is soft.      Tenderness: There is no abdominal tenderness.   Musculoskeletal:         General: No tenderness.      Cervical back: Neck supple.   Skin:     General: Skin is warm and dry.      Findings: No erythema or rash.   Neurological:      Mental Status: She is alert and oriented to person, place, and time.   Psychiatric:         Behavior: Behavior normal.     EKG: NSR, LAFB + IRBB, unchanged from previous.         Assessment:       Problem List Items Addressed This Visit          Cardiology Problems    Hypertension associated with stage 3 chronic kidney disease due to type 2 diabetes mellitus (Chronic)    Mixed diabetic hyperlipidemia associated with type 2 diabetes mellitus (Chronic)    Aortic atherosclerosis    Coronary artery disease involving native coronary artery of native heart without angina pectoris    Pericarditis       Other    Acquired hypothyroidism (Chronic)    Controlled type 2 diabetes mellitus with complication, with long-term current use of insulin (Chronic)    Immunosuppression (Chronic)    Kidney transplant recipient (Chronic)    NAFLD (nonalcoholic  fatty liver disease) (Chronic)    Obesity (BMI 30-39.9) (Chronic)    Stage 3a chronic kidney disease (Chronic)    Chest pain - Primary    Relevant Orders    IN OFFICE EKG 12-LEAD (to Ray)          Plan:       Nuclear stress test to evaluate for ischemia.   Echo to evaluate for structural heart disease.   Continue current cardiac medications.   Risk factor modification including diet and exercise.   F/U with Dr. Dang in 3-4 months.

## 2023-02-10 ENCOUNTER — PATIENT MESSAGE (OUTPATIENT)
Dept: SPORTS MEDICINE | Facility: CLINIC | Age: 63
End: 2023-02-10
Payer: MEDICARE

## 2023-02-13 NOTE — PLAN OF CARE
Problem: Patient Care Overview  Goal: Plan of Care Review  Outcome: Ongoing (interventions implemented as appropriate)  POC reviewed with pt, understanding verbalized. AAOX4. No c/o pain throughout shift. Up with assist x1 to bedside commode. Room air. Glucose monitoring/Coverage AC/HS As needed. Cardiac/Renal diet. Tele monitoring maintained. Dialysis scheduled for Friday. Safety Maintained. Will continue to monitor.       Alternatives Discussed Intro (Do Not Add Period): I discussed alternative treatments to Mohs surgery and specifically discussed the risks and benefits of

## 2023-02-27 ENCOUNTER — TELEPHONE (OUTPATIENT)
Dept: SPORTS MEDICINE | Facility: CLINIC | Age: 63
End: 2023-02-27
Payer: MEDICARE

## 2023-02-27 ENCOUNTER — TELEPHONE (OUTPATIENT)
Dept: PRIMARY CARE CLINIC | Facility: CLINIC | Age: 63
End: 2023-02-27
Payer: MEDICARE

## 2023-02-27 NOTE — TELEPHONE ENCOUNTER
----- Message from April Tio sent at 2/27/2023  9:25 AM CST -----  Regarding: paper work  Pt called, said she mailed out some disability paper work for Dr. Mon to sign last week, she would like an update. Please call back @ [phone].

## 2023-02-27 NOTE — TELEPHONE ENCOUNTER
Called patient and confirmed paperwork was received. Patient requested that completed paperwork be faxed to the number on the first sheet.     Lacie Hebert  Sports Medicine Assistant Resident       ----- Message from Debora Angel sent at 2/27/2023 11:14 AM CST -----  Regarding: FW: PT'S REQUESTING A CALL BACK REGARDING IF MAILED DISABILITY PAPERWORK WAS RECEIVED  Contact: PT  Will you call her and let her know it was received!   ----- Message -----  From: Mary Obregon  Sent: 2/27/2023   9:34 AM CST  To: [facility] Staff  Subject: PT'S REQUESTING A CALL BACK REGARDING IF KRISTIE#    Confirmed contact info below:  Contact Name: Andra Newell  Phone Number: [phone]

## 2023-02-27 NOTE — TELEPHONE ENCOUNTER
Paper work faxed to [employer] at fax number [phone]; confirmation received. Left a message on patients voicemail to notify her.

## 2023-03-06 ENCOUNTER — PATIENT MESSAGE (OUTPATIENT)
Dept: SPORTS MEDICINE | Facility: CLINIC | Age: 63
End: 2023-03-06
Payer: MEDICARE

## 2023-03-14 ENCOUNTER — TELEPHONE (OUTPATIENT)
Dept: CARDIOLOGY | Facility: CLINIC | Age: 63
End: 2023-03-14

## 2023-03-14 NOTE — TELEPHONE ENCOUNTER
----- Message from Anna Sage sent at 3/14/2023 12:22 PM CDT -----  Regarding: Needs tests rescheduled  Type: Needs Medical Advice  Who Called:  Andra    Graham Call Back Number: [phone]    Additional Information: Pt asked if she can speak to someone and also needs to schedule her stress and echo test

## 2023-04-04 ENCOUNTER — PATIENT MESSAGE (OUTPATIENT)
Dept: CARDIOLOGY | Facility: HOSPITAL | Age: 63
End: 2023-04-04

## 2023-04-05 ENCOUNTER — HOSPITAL ENCOUNTER (OUTPATIENT)
Dept: RADIOLOGY | Facility: HOSPITAL | Age: 63
Discharge: HOME | End: 2023-04-05
Attending: PHYSICIAN ASSISTANT
Payer: MEDICARE

## 2023-04-05 ENCOUNTER — CLINICAL SUPPORT (OUTPATIENT)
Dept: CARDIOLOGY | Facility: HOSPITAL | Age: 63
End: 2023-04-05
Attending: PHYSICIAN ASSISTANT
Payer: MEDICARE

## 2023-04-05 VITALS
WEIGHT: 207 LBS | DIASTOLIC BLOOD PRESSURE: 78 MMHG | HEIGHT: 67 IN | BODY MASS INDEX: 32.49 KG/M2 | SYSTOLIC BLOOD PRESSURE: 158 MMHG

## 2023-04-05 VITALS — HEIGHT: 67 IN | BODY MASS INDEX: 32.49 KG/M2 | WEIGHT: 207 LBS

## 2023-04-05 DIAGNOSIS — R07.9 CHEST PAIN, UNSPECIFIED TYPE: ICD-10-CM

## 2023-04-05 DIAGNOSIS — I25.10 CORONARY ARTERY DISEASE INVOLVING NATIVE CORONARY ARTERY OF NATIVE HEART WITHOUT ANGINA PECTORIS: ICD-10-CM

## 2023-04-05 PROCEDURE — 78452 NUCLEAR STRESS - CARDIOLOGY INTERPRETED (CUPID ONLY): ICD-10-PCS | Mod: 26,,, | Performed by: INTERNAL MEDICINE

## 2023-04-05 PROCEDURE — 93306 TTE W/DOPPLER COMPLETE: CPT | Mod: 26,,, | Performed by: INTERNAL MEDICINE

## 2023-04-05 PROCEDURE — 78452 HT MUSCLE IMAGE SPECT MULT: CPT | Mod: 26,,, | Performed by: INTERNAL MEDICINE

## 2023-04-05 PROCEDURE — 93306 ECHO (CUPID ONLY): ICD-10-PCS | Mod: 26,,, | Performed by: INTERNAL MEDICINE

## 2023-04-05 PROCEDURE — 93016 NUCLEAR STRESS - CARDIOLOGY INTERPRETED (CUPID ONLY): ICD-10-PCS | Mod: ,,, | Performed by: INTERNAL MEDICINE

## 2023-04-05 PROCEDURE — 93016 CV STRESS TEST SUPVJ ONLY: CPT | Mod: ,,, | Performed by: INTERNAL MEDICINE

## 2023-04-05 PROCEDURE — 93018 PR CARDIAC STRESS TST,INTERP/REPT ONLY: ICD-10-PCS | Mod: ,,, | Performed by: INTERNAL MEDICINE

## 2023-04-05 PROCEDURE — A9502 TC99M TETROFOSMIN: HCPCS | Mod: PO

## 2023-04-05 PROCEDURE — 93018 CV STRESS TEST I&R ONLY: CPT | Mod: ,,, | Performed by: INTERNAL MEDICINE

## 2023-04-05 PROCEDURE — 63600175 PHARM REV CODE 636 W HCPCS: Mod: PO | Performed by: PHYSICIAN ASSISTANT

## 2023-04-05 PROCEDURE — 93017 CV STRESS TEST TRACING ONLY: CPT | Mod: PO

## 2023-04-05 PROCEDURE — 78452 HT MUSCLE IMAGE SPECT MULT: CPT | Mod: PO

## 2023-04-05 PROCEDURE — 93306 TTE W/DOPPLER COMPLETE: CPT | Mod: PO

## 2023-04-05 RX ORDER — REGADENOSON 0.08 MG/ML
0.4 INJECTION, SOLUTION INTRAVENOUS
Status: COMPLETED | OUTPATIENT
Start: 2023-04-05 | End: 2023-04-05

## 2023-04-05 RX ADMIN — REGADENOSON 0.4 MG: 0.08 INJECTION, SOLUTION INTRAVENOUS at 01:04

## 2023-04-06 LAB
ASCENDING AORTA: 3.21 CM
AV INDEX (PROSTH): 0.48
AV MEAN GRADIENT: 17 MMHG
AV PEAK GRADIENT: 32 MMHG
AV VALVE AREA: 1.7 CM2
AV VELOCITY RATIO: 0.46
BSA FOR ECHO PROCEDURE: 2.11 M2
CV ECHO LV RWT: 0.63 CM
CV PHARM DOSE: 0.4 MG
CV STRESS BASE HR: 85 BPM
DIASTOLIC BLOOD PRESSURE: 77 MMHG
DOP CALC AO PEAK VEL: 2.81 M/S
DOP CALC AO VTI: 52.3 CM
DOP CALC LVOT AREA: 3.6 CM2
DOP CALC LVOT DIAMETER: 2.13 CM
DOP CALC LVOT PEAK VEL: 1.3 M/S
DOP CALC LVOT STROKE VOLUME: 89.04 CM3
DOP CALCLVOT PEAK VEL VTI: 25 CM
E WAVE DECELERATION TIME: 214.4 MSEC
E/A RATIO: 0.71
E/E' RATIO: 8.29 M/S
ECHO LV POSTERIOR WALL: 1.52 CM (ref 0.6–1.1)
EJECTION FRACTION: 60 %
FRACTIONAL SHORTENING: 45 % (ref 28–44)
INTERVENTRICULAR SEPTUM: 1.4 CM (ref 0.6–1.1)
LA MAJOR: 4.51 CM
LA MINOR: 4.65 CM
LA WIDTH: 3.2 CM
LEFT ATRIUM SIZE: 3.88 CM
LEFT ATRIUM VOLUME INDEX: 23.6 ML/M2
LEFT ATRIUM VOLUME: 48.32 CM3
LEFT INTERNAL DIMENSION IN SYSTOLE: 2.64 CM (ref 2.1–4)
LEFT VENTRICLE DIASTOLIC VOLUME INDEX: 52.27 ML/M2
LEFT VENTRICLE DIASTOLIC VOLUME: 107.16 ML
LEFT VENTRICLE MASS INDEX: 142 G/M2
LEFT VENTRICLE SYSTOLIC VOLUME INDEX: 12.5 ML/M2
LEFT VENTRICLE SYSTOLIC VOLUME: 25.58 ML
LEFT VENTRICULAR INTERNAL DIMENSION IN DIASTOLE: 4.79 CM (ref 3.5–6)
LEFT VENTRICULAR MASS: 290.48 G
LV LATERAL E/E' RATIO: 6.44 M/S
LV SEPTAL E/E' RATIO: 11.6 M/S
LVOT MG: 4.53 MMHG
LVOT MV: 1.04 CM/S
MV PEAK A VEL: 0.82 M/S
MV PEAK E VEL: 0.58 M/S
NUC REST EJECTION FRACTION: 63
OHS CV CPX 1 MINUTE RECOVERY HEART RATE: 123 BPM
OHS CV CPX 85 PERCENT MAX PREDICTED HEART RATE MALE: 126
OHS CV CPX MAX PREDICTED HEART RATE: 149
OHS CV CPX PATIENT IS FEMALE: 1
OHS CV CPX PATIENT IS MALE: 0
OHS CV CPX PEAK DIASTOLIC BLOOD PRESSURE: 78 MMHG
OHS CV CPX PEAK HEAR RATE: 127 BPM
OHS CV CPX PEAK RATE PRESSURE PRODUCT: NORMAL
OHS CV CPX PEAK SYSTOLIC BLOOD PRESSURE: 203 MMHG
OHS CV CPX PERCENT MAX PREDICTED HEART RATE ACHIEVED: 85
OHS CV CPX RATE PRESSURE PRODUCT PRESENTING: NORMAL
OHS CV PHARM TIME: 1354 MIN
RA MAJOR: 4.32 CM
RA PRESSURE: 3 MMHG
RA WIDTH: 3 CM
RV TISSUE DOPPLER FREE WALL SYSTOLIC VELOCITY 1 (APICAL 4 CHAMBER VIEW): 0.01 CM/S
SINUS: 2.94 CM
STJ: 3.07 CM
SYSTOLIC BLOOD PRESSURE: 185 MMHG
TDI LATERAL: 0.09 M/S
TDI SEPTAL: 0.05 M/S
TDI: 0.07 M/S
TRICUSPID ANNULAR PLANE SYSTOLIC EXCURSION: 2.3 CM

## 2023-04-11 NOTE — PROGRESS NOTES
THIS DOCUMENT WAS MADE IN PART WITH VOICE RECOGNITION SOFTWARE.  OCCASIONALLY THIS SOFTWARE WILL MISINTERPRET WORDS OR PHRASES.      Primary Care Provider Appointment   [facility]       Patient ID: Andra Newell is a 63 y.o. female.    ASSESSMENT/PLAN by Problem List:    1. Chest pain, unspecified type  Assessment & Plan:  Recent emergency room visit secondary to chest pain.  Chest pain persists but does vary in quality and intensity.  She just recently had a normal nuclear stress test.  Cardiac enzymes were normal and there was no evidence of acute cardiac cause.  She is quite tender with palpation across the anterior ribs and sternum bilaterally.  This does not exclude a cardiac cause but given the other findings I do not believe that her current symptoms are cardiac in nature.  Likely component of musculoskeletal.  She can not take NSAIDs due to her medical conditions.  She I would recommend a trial of lidocaine patches, also consider ice.  Will monitor.  Symptoms may be exacerbated by allergies, sneezing and coughing so will address this as well recommend that she take Claritin or Xyzal.      2. Controlled type 2 diabetes mellitus with complication, with long-term current use of insulin  Assessment & Plan:  Her diabetes has improved and is satisfactory.  However she is taking rather high doses of insulin for control.  I have explained to her that I would really like to set a goal of trying to reduce insulin requirements over time as this will help her overall condition.  So we did talk about some dietary changes and gradual reduction carbohydrates.  No drastic carbohydrate reduction because of risk of hypoglycemia.  I also would recommend discussing with her endocrinologist about possibly considering an SGLT 2 inhibitor, or a GLP 1 agonist.  These may help to improve glucose control, help to lose weight, and hopefully reduce insulin requirements.      3. Fatigue,  unspecified type  Assessment & Plan:  Fatigue is multifactorial.  She reminded me that she had a sleep study a few years ago before she began seeing me at an ENT clinic in [place] but states that she does not recall receiving any results from this.  So we have requested these results.  I feel that she is at high risk for sleep apnea and this could contribute to her fatigue.  If we can not get these results I would recommend repeating a sleep study.  Otherwise I recommend improving nutrition, continuing to work on diabetes control, trying to reduce insulin requirements.      4. Depression, unspecified depression type  Assessment & Plan:  I did speak to her about this.  She does continue to see an outside psychiatrist regularly every three months and remains on Viibryd.  She feels that she is doing well in this regard and did not need any additional assistance.           Follow Up:  As scheduled on March 3rd    Fifty minutes of total time spent on the encounter, time includes face to face time, and some or all of the following: review of chart, lab, imaging, consultant notes, ER, hospital, documentation, care coordination, etc.    Health Maintenance         Date Due Completion Date    Shingles Vaccine (1 of 2) Never done ---    DEXA Scan 07/05/2021 7/5/2018    COVID-19 Vaccine (3 - Booster for Pfizer series) 09/08/2021 7/14/2021    Eye Exam 11/18/2021 11/18/2020    Mammogram 08/05/2022 8/5/2021    Foot Exam 03/31/2023 3/31/2022    Override on 10/2/2019: Done    Hemoglobin A1c 07/04/2023 1/4/2023    Lipid Panel 08/31/2023 8/31/2022    Diabetes Urine Screening 01/04/2024 1/4/2023    High Dose Statin 04/10/2024 4/10/2023    Pneumococcal Vaccines (Age 65+) (3 - PPSV23 if available, else PCV20) 04/30/2024 4/30/2019    Colorectal Cancer Screening 05/09/2024 5/9/2019    TETANUS VACCINE 07/12/2027 7/12/2017            Subjective:     Chief Complaint   Patient presents with    Hospital Follow Up     C/o stabbing chest pain  that comes and goes, severe fatigue-sleeps up to 18 hours a day, weakness, legs feel like jelly, nauseated, headache off and on. Patient was seen at [facility] ER on 04/10/23     I have reviewed the information entered by the ancillary staff regarding the chief complaint as well as the related history.    HPI    Patient is a/an 63 y.o.  female     Initially stabbing, with breathing  Then heaviness, to ER  Then back to sharp, today some heaviness  Sharpness with breathing  Heaviness comes and goes, nitro did help    Sneezing , triggering paradoxical spells    reminded eye exam    For complete problem list, past medical history, surgical history, social history, etc., see appropriate section in the electronic medical record    Review of Systems   Constitutional:  Positive for unexpected weight change.   HENT:  Positive for rhinorrhea and sneezing.    Cardiovascular:  Positive for chest pain.   Musculoskeletal:  Positive for back pain.     Objective     Physical Exam  Vitals reviewed.   Constitutional:       Appearance: She is well-developed. She is not diaphoretic.   HENT:      Head: Normocephalic and atraumatic. No right periorbital erythema or left periorbital erythema.      Right Ear: Tympanic membrane, ear canal and external ear normal. No drainage. There is no impacted cerumen.      Left Ear: Tympanic membrane, ear canal and external ear normal. No drainage. There is no impacted cerumen.      Nose: No mucosal edema (Boggy swollen turbinates) or rhinorrhea (Clear).      Mouth/Throat:      Pharynx: Uvula midline. No oropharyngeal exudate or posterior oropharyngeal erythema.   Eyes:      General: No scleral icterus.        Right eye: No discharge.         Left eye: No discharge.      Conjunctiva/sclera: Conjunctivae normal.      Pupils: Pupils are equal, round, and reactive to light.   Neck:      Thyroid: No thyromegaly.      Trachea: No tracheal deviation.   Cardiovascular:      Rate and Rhythm: Normal rate and regular  "rhythm.      Heart sounds: Normal heart sounds. No murmur heard.    No friction rub. No gallop.   Pulmonary:      Effort: Pulmonary effort is normal. No respiratory distress.      Breath sounds: Normal breath sounds. No stridor. No wheezing or rales.   Chest:      Comments: Diffuse tenderness across the costosternal joints bilaterally.  There is no palpable or visible deformity.  Musculoskeletal:      Cervical back: Normal range of motion and neck supple.   Lymphadenopathy:      Cervical: No cervical adenopathy.   Skin:     General: Skin is warm and dry.   Neurological:      Mental Status: She is alert and oriented to person, place, and time.      Cranial Nerves: No cranial nerve deficit.      Deep Tendon Reflexes: Reflexes are normal and symmetric.   Psychiatric:         Behavior: Behavior normal.     Vitals:    04/12/23 1348   BP: 134/62   BP Location: Left arm   Patient Position: Sitting   BP Method: Large (Manual)   Pulse: 86   Resp: 18   SpO2: 95%   Weight: 92.4 kg (203 lb 13 oz)   Height: 5' 7" (1.702 m)       "

## 2023-04-12 ENCOUNTER — OFFICE VISIT (OUTPATIENT)
Dept: PRIMARY CARE CLINIC | Facility: CLINIC | Age: 63
End: 2023-04-12
Payer: MEDICARE

## 2023-04-12 VITALS
RESPIRATION RATE: 18 BRPM | HEART RATE: 86 BPM | BODY MASS INDEX: 31.99 KG/M2 | OXYGEN SATURATION: 95 % | DIASTOLIC BLOOD PRESSURE: 62 MMHG | SYSTOLIC BLOOD PRESSURE: 134 MMHG | HEIGHT: 67 IN | WEIGHT: 203.81 LBS

## 2023-04-12 DIAGNOSIS — F32.A DEPRESSION, UNSPECIFIED DEPRESSION TYPE: ICD-10-CM

## 2023-04-12 DIAGNOSIS — E11.8 CONTROLLED TYPE 2 DIABETES MELLITUS WITH COMPLICATION, WITH LONG-TERM CURRENT USE OF INSULIN: Chronic | ICD-10-CM

## 2023-04-12 DIAGNOSIS — Z79.4 CONTROLLED TYPE 2 DIABETES MELLITUS WITH COMPLICATION, WITH LONG-TERM CURRENT USE OF INSULIN: Chronic | ICD-10-CM

## 2023-04-12 DIAGNOSIS — R07.9 CHEST PAIN, UNSPECIFIED TYPE: Primary | ICD-10-CM

## 2023-04-12 DIAGNOSIS — R53.83 FATIGUE, UNSPECIFIED TYPE: ICD-10-CM

## 2023-04-12 PROCEDURE — 99999 PR PBB SHADOW E&M-EST. PATIENT-LVL III: ICD-10-PCS | Mod: PBBFAC,,, | Performed by: FAMILY MEDICINE

## 2023-04-12 PROCEDURE — 3061F NEG MICROALBUMINURIA REV: CPT | Mod: CPTII,S$GLB,, | Performed by: FAMILY MEDICINE

## 2023-04-12 PROCEDURE — 1101F PT FALLS ASSESS-DOCD LE1/YR: CPT | Mod: CPTII,S$GLB,, | Performed by: FAMILY MEDICINE

## 2023-04-12 PROCEDURE — 99999 PR PBB SHADOW E&M-EST. PATIENT-LVL III: CPT | Mod: PBBFAC,,, | Performed by: FAMILY MEDICINE

## 2023-04-12 PROCEDURE — 1160F PR REVIEW ALL MEDS BY PRESCRIBER/CLIN PHARMACIST DOCUMENTED: ICD-10-PCS | Mod: CPTII,S$GLB,, | Performed by: FAMILY MEDICINE

## 2023-04-12 PROCEDURE — 1159F PR MEDICATION LIST DOCUMENTED IN MEDICAL RECORD: ICD-10-PCS | Mod: CPTII,S$GLB,, | Performed by: FAMILY MEDICINE

## 2023-04-12 PROCEDURE — 3051F PR MOST RECENT HEMOGLOBIN A1C LEVEL 7.0 - < 8.0%: ICD-10-PCS | Mod: CPTII,S$GLB,, | Performed by: FAMILY MEDICINE

## 2023-04-12 PROCEDURE — 4010F ACE/ARB THERAPY RXD/TAKEN: CPT | Mod: CPTII,S$GLB,, | Performed by: FAMILY MEDICINE

## 2023-04-12 PROCEDURE — 99215 OFFICE O/P EST HI 40 MIN: CPT | Mod: S$GLB,,, | Performed by: FAMILY MEDICINE

## 2023-04-12 PROCEDURE — 3066F NEPHROPATHY DOC TX: CPT | Mod: CPTII,S$GLB,, | Performed by: FAMILY MEDICINE

## 2023-04-12 PROCEDURE — 3078F DIAST BP <80 MM HG: CPT | Mod: CPTII,S$GLB,, | Performed by: FAMILY MEDICINE

## 2023-04-12 PROCEDURE — 3008F BODY MASS INDEX DOCD: CPT | Mod: CPTII,S$GLB,, | Performed by: FAMILY MEDICINE

## 2023-04-12 PROCEDURE — 3288F PR FALLS RISK ASSESSMENT DOCUMENTED: ICD-10-PCS | Mod: CPTII,S$GLB,, | Performed by: FAMILY MEDICINE

## 2023-04-12 PROCEDURE — 3008F PR BODY MASS INDEX (BMI) DOCUMENTED: ICD-10-PCS | Mod: CPTII,S$GLB,, | Performed by: FAMILY MEDICINE

## 2023-04-12 PROCEDURE — 4010F PR ACE/ARB THEARPY RXD/TAKEN: ICD-10-PCS | Mod: CPTII,S$GLB,, | Performed by: FAMILY MEDICINE

## 2023-04-12 PROCEDURE — 1101F PR PT FALLS ASSESS DOC 0-1 FALLS W/OUT INJ PAST YR: ICD-10-PCS | Mod: CPTII,S$GLB,, | Performed by: FAMILY MEDICINE

## 2023-04-12 PROCEDURE — 3078F PR MOST RECENT DIASTOLIC BLOOD PRESSURE < 80 MM HG: ICD-10-PCS | Mod: CPTII,S$GLB,, | Performed by: FAMILY MEDICINE

## 2023-04-12 PROCEDURE — 1157F PR ADVANCE CARE PLAN OR EQUIV PRESENT IN MEDICAL RECORD: ICD-10-PCS | Mod: CPTII,S$GLB,, | Performed by: FAMILY MEDICINE

## 2023-04-12 PROCEDURE — 3075F PR MOST RECENT SYSTOLIC BLOOD PRESS GE 130-139MM HG: ICD-10-PCS | Mod: CPTII,S$GLB,, | Performed by: FAMILY MEDICINE

## 2023-04-12 PROCEDURE — 99215 PR OFFICE/OUTPT VISIT, EST, LEVL V, 40-54 MIN: ICD-10-PCS | Mod: S$GLB,,, | Performed by: FAMILY MEDICINE

## 2023-04-12 PROCEDURE — 3051F HG A1C>EQUAL 7.0%<8.0%: CPT | Mod: CPTII,S$GLB,, | Performed by: FAMILY MEDICINE

## 2023-04-12 PROCEDURE — 3061F PR NEG MICROALBUMINURIA RESULT DOCUMENTED/REVIEW: ICD-10-PCS | Mod: CPTII,S$GLB,, | Performed by: FAMILY MEDICINE

## 2023-04-12 PROCEDURE — 1157F ADVNC CARE PLAN IN RCRD: CPT | Mod: CPTII,S$GLB,, | Performed by: FAMILY MEDICINE

## 2023-04-12 PROCEDURE — 3066F PR DOCUMENTATION OF TREATMENT FOR NEPHROPATHY: ICD-10-PCS | Mod: CPTII,S$GLB,, | Performed by: FAMILY MEDICINE

## 2023-04-12 PROCEDURE — 3288F FALL RISK ASSESSMENT DOCD: CPT | Mod: CPTII,S$GLB,, | Performed by: FAMILY MEDICINE

## 2023-04-12 PROCEDURE — 3075F SYST BP GE 130 - 139MM HG: CPT | Mod: CPTII,S$GLB,, | Performed by: FAMILY MEDICINE

## 2023-04-12 PROCEDURE — 1159F MED LIST DOCD IN RCRD: CPT | Mod: CPTII,S$GLB,, | Performed by: FAMILY MEDICINE

## 2023-04-12 PROCEDURE — 1160F RVW MEDS BY RX/DR IN RCRD: CPT | Mod: CPTII,S$GLB,, | Performed by: FAMILY MEDICINE

## 2023-04-12 NOTE — ASSESSMENT & PLAN NOTE
Her diabetes has improved and is satisfactory.  However she is taking rather high doses of insulin for control.  I have explained to her that I would really like to set a goal of trying to reduce insulin requirements over time as this will help her overall condition.  So we did talk about some dietary changes and gradual reduction carbohydrates.  No drastic carbohydrate reduction because of risk of hypoglycemia.  I also would recommend discussing with her endocrinologist about possibly considering an SGLT 2 inhibitor, or a GLP 1 agonist.  These may help to improve glucose control, help to lose weight, and hopefully reduce insulin requirements.

## 2023-04-12 NOTE — ASSESSMENT & PLAN NOTE
Recent emergency room visit secondary to chest pain.  Chest pain persists but does vary in quality and intensity.  She just recently had a normal nuclear stress test.  Cardiac enzymes were normal and there was no evidence of acute cardiac cause.  She is quite tender with palpation across the anterior ribs and sternum bilaterally.  This does not exclude a cardiac cause but given the other findings I do not believe that her current symptoms are cardiac in nature.  Likely component of musculoskeletal.  She can not take NSAIDs due to her medical conditions.  She I would recommend a trial of lidocaine patches, also consider ice.  Will monitor.  Symptoms may be exacerbated by allergies, sneezing and coughing so will address this as well recommend that she take Claritin or Xyzal.

## 2023-04-12 NOTE — ASSESSMENT & PLAN NOTE
I did speak to her about this.  She does continue to see an outside psychiatrist regularly every three months and remains on Viibryd.  She feels that she is doing well in this regard and did not need any additional assistance.

## 2023-04-12 NOTE — ASSESSMENT & PLAN NOTE
Fatigue is multifactorial.  She reminded me that she had a sleep study a few years ago before she began seeing me at an ENT clinic in [place] but states that she does not recall receiving any results from this.  So we have requested these results.  I feel that she is at high risk for sleep apnea and this could contribute to her fatigue.  If we can not get these results I would recommend repeating a sleep study.  Otherwise I recommend improving nutrition, continuing to work on diabetes control, trying to reduce insulin requirements.

## 2023-04-17 ENCOUNTER — OFFICE VISIT (OUTPATIENT)
Dept: ENDOCRINOLOGY | Facility: CLINIC | Age: 63
End: 2023-04-17
Payer: MEDICARE

## 2023-04-17 ENCOUNTER — TELEPHONE (OUTPATIENT)
Dept: CARDIOLOGY | Facility: CLINIC | Age: 63
End: 2023-04-17

## 2023-04-17 ENCOUNTER — PATIENT MESSAGE (OUTPATIENT)
Dept: ENDOCRINOLOGY | Facility: CLINIC | Age: 63
End: 2023-04-17

## 2023-04-17 VITALS
SYSTOLIC BLOOD PRESSURE: 140 MMHG | OXYGEN SATURATION: 97 % | HEART RATE: 67 BPM | DIASTOLIC BLOOD PRESSURE: 68 MMHG | HEIGHT: 67 IN | WEIGHT: 205.38 LBS | BODY MASS INDEX: 32.24 KG/M2

## 2023-04-17 DIAGNOSIS — E11.69 TYPE 2 DIABETES MELLITUS WITH OTHER SPECIFIED COMPLICATION, WITH LONG-TERM CURRENT USE OF INSULIN: ICD-10-CM

## 2023-04-17 DIAGNOSIS — E11.22 HYPERTENSION ASSOCIATED WITH STAGE 3 CHRONIC KIDNEY DISEASE DUE TO TYPE 2 DIABETES MELLITUS: Chronic | ICD-10-CM

## 2023-04-17 DIAGNOSIS — N18.30 HYPERTENSION ASSOCIATED WITH STAGE 3 CHRONIC KIDNEY DISEASE DUE TO TYPE 2 DIABETES MELLITUS: Chronic | ICD-10-CM

## 2023-04-17 DIAGNOSIS — Z79.4 TYPE 2 DIABETES MELLITUS WITH STAGE 3A CHRONIC KIDNEY DISEASE, WITH LONG-TERM CURRENT USE OF INSULIN: Primary | ICD-10-CM

## 2023-04-17 DIAGNOSIS — E66.9 OBESITY (BMI 30-39.9): Chronic | ICD-10-CM

## 2023-04-17 DIAGNOSIS — N18.31 TYPE 2 DIABETES MELLITUS WITH STAGE 3A CHRONIC KIDNEY DISEASE, WITH LONG-TERM CURRENT USE OF INSULIN: Primary | ICD-10-CM

## 2023-04-17 DIAGNOSIS — E11.69 MIXED DIABETIC HYPERLIPIDEMIA ASSOCIATED WITH TYPE 2 DIABETES MELLITUS: Chronic | ICD-10-CM

## 2023-04-17 DIAGNOSIS — E03.9 ACQUIRED HYPOTHYROIDISM: Chronic | ICD-10-CM

## 2023-04-17 DIAGNOSIS — E11.22 TYPE 2 DIABETES MELLITUS WITH STAGE 3A CHRONIC KIDNEY DISEASE, WITH LONG-TERM CURRENT USE OF INSULIN: Primary | ICD-10-CM

## 2023-04-17 DIAGNOSIS — I12.9 HYPERTENSION ASSOCIATED WITH STAGE 3 CHRONIC KIDNEY DISEASE DUE TO TYPE 2 DIABETES MELLITUS: Chronic | ICD-10-CM

## 2023-04-17 DIAGNOSIS — E78.2 MIXED DIABETIC HYPERLIPIDEMIA ASSOCIATED WITH TYPE 2 DIABETES MELLITUS: Chronic | ICD-10-CM

## 2023-04-17 DIAGNOSIS — Z79.60 LONG-TERM USE OF IMMUNOSUPPRESSANT MEDICATION: Chronic | ICD-10-CM

## 2023-04-17 DIAGNOSIS — Z79.4 TYPE 2 DIABETES MELLITUS WITH OTHER SPECIFIED COMPLICATION, WITH LONG-TERM CURRENT USE OF INSULIN: ICD-10-CM

## 2023-04-17 DIAGNOSIS — I25.10 CORONARY ARTERY DISEASE INVOLVING NATIVE CORONARY ARTERY OF NATIVE HEART WITHOUT ANGINA PECTORIS: ICD-10-CM

## 2023-04-17 DIAGNOSIS — Z78.0 POSTMENOPAUSAL: ICD-10-CM

## 2023-04-17 PROCEDURE — 4010F PR ACE/ARB THEARPY RXD/TAKEN: ICD-10-PCS | Mod: CPTII,S$GLB,, | Performed by: NURSE PRACTITIONER

## 2023-04-17 PROCEDURE — 3051F PR MOST RECENT HEMOGLOBIN A1C LEVEL 7.0 - < 8.0%: ICD-10-PCS | Mod: CPTII,S$GLB,, | Performed by: NURSE PRACTITIONER

## 2023-04-17 PROCEDURE — 99999 PR PBB SHADOW E&M-EST. PATIENT-LVL III: CPT | Mod: PBBFAC,,, | Performed by: NURSE PRACTITIONER

## 2023-04-17 PROCEDURE — 3008F PR BODY MASS INDEX (BMI) DOCUMENTED: ICD-10-PCS | Mod: CPTII,S$GLB,, | Performed by: NURSE PRACTITIONER

## 2023-04-17 PROCEDURE — 3066F NEPHROPATHY DOC TX: CPT | Mod: CPTII,S$GLB,, | Performed by: NURSE PRACTITIONER

## 2023-04-17 PROCEDURE — 95251 PR GLUCOSE MONITOR, 72 HOUR, PHYS INTERP: ICD-10-PCS | Mod: S$GLB,,, | Performed by: NURSE PRACTITIONER

## 2023-04-17 PROCEDURE — 3288F PR FALLS RISK ASSESSMENT DOCUMENTED: ICD-10-PCS | Mod: CPTII,S$GLB,, | Performed by: NURSE PRACTITIONER

## 2023-04-17 PROCEDURE — 99999 PR PBB SHADOW E&M-EST. PATIENT-LVL III: ICD-10-PCS | Mod: PBBFAC,,, | Performed by: NURSE PRACTITIONER

## 2023-04-17 PROCEDURE — 3066F PR DOCUMENTATION OF TREATMENT FOR NEPHROPATHY: ICD-10-PCS | Mod: CPTII,S$GLB,, | Performed by: NURSE PRACTITIONER

## 2023-04-17 PROCEDURE — 99214 PR OFFICE/OUTPT VISIT, EST, LEVL IV, 30-39 MIN: ICD-10-PCS | Mod: S$GLB,,, | Performed by: NURSE PRACTITIONER

## 2023-04-17 PROCEDURE — 1157F PR ADVANCE CARE PLAN OR EQUIV PRESENT IN MEDICAL RECORD: ICD-10-PCS | Mod: CPTII,S$GLB,, | Performed by: NURSE PRACTITIONER

## 2023-04-17 PROCEDURE — 95251 CONT GLUC MNTR ANALYSIS I&R: CPT | Mod: S$GLB,,, | Performed by: NURSE PRACTITIONER

## 2023-04-17 PROCEDURE — 1101F PT FALLS ASSESS-DOCD LE1/YR: CPT | Mod: CPTII,S$GLB,, | Performed by: NURSE PRACTITIONER

## 2023-04-17 PROCEDURE — 1159F MED LIST DOCD IN RCRD: CPT | Mod: CPTII,S$GLB,, | Performed by: NURSE PRACTITIONER

## 2023-04-17 PROCEDURE — 1101F PR PT FALLS ASSESS DOC 0-1 FALLS W/OUT INJ PAST YR: ICD-10-PCS | Mod: CPTII,S$GLB,, | Performed by: NURSE PRACTITIONER

## 2023-04-17 PROCEDURE — 3061F NEG MICROALBUMINURIA REV: CPT | Mod: CPTII,S$GLB,, | Performed by: NURSE PRACTITIONER

## 2023-04-17 PROCEDURE — 3288F FALL RISK ASSESSMENT DOCD: CPT | Mod: CPTII,S$GLB,, | Performed by: NURSE PRACTITIONER

## 2023-04-17 PROCEDURE — 3078F DIAST BP <80 MM HG: CPT | Mod: CPTII,S$GLB,, | Performed by: NURSE PRACTITIONER

## 2023-04-17 PROCEDURE — 1159F PR MEDICATION LIST DOCUMENTED IN MEDICAL RECORD: ICD-10-PCS | Mod: CPTII,S$GLB,, | Performed by: NURSE PRACTITIONER

## 2023-04-17 PROCEDURE — 3077F PR MOST RECENT SYSTOLIC BLOOD PRESSURE >= 140 MM HG: ICD-10-PCS | Mod: CPTII,S$GLB,, | Performed by: NURSE PRACTITIONER

## 2023-04-17 PROCEDURE — 3051F HG A1C>EQUAL 7.0%<8.0%: CPT | Mod: CPTII,S$GLB,, | Performed by: NURSE PRACTITIONER

## 2023-04-17 PROCEDURE — 3077F SYST BP >= 140 MM HG: CPT | Mod: CPTII,S$GLB,, | Performed by: NURSE PRACTITIONER

## 2023-04-17 PROCEDURE — 3061F PR NEG MICROALBUMINURIA RESULT DOCUMENTED/REVIEW: ICD-10-PCS | Mod: CPTII,S$GLB,, | Performed by: NURSE PRACTITIONER

## 2023-04-17 PROCEDURE — 4010F ACE/ARB THERAPY RXD/TAKEN: CPT | Mod: CPTII,S$GLB,, | Performed by: NURSE PRACTITIONER

## 2023-04-17 PROCEDURE — 3008F BODY MASS INDEX DOCD: CPT | Mod: CPTII,S$GLB,, | Performed by: NURSE PRACTITIONER

## 2023-04-17 PROCEDURE — 3078F PR MOST RECENT DIASTOLIC BLOOD PRESSURE < 80 MM HG: ICD-10-PCS | Mod: CPTII,S$GLB,, | Performed by: NURSE PRACTITIONER

## 2023-04-17 PROCEDURE — 99214 OFFICE O/P EST MOD 30 MIN: CPT | Mod: S$GLB,,, | Performed by: NURSE PRACTITIONER

## 2023-04-17 PROCEDURE — 1157F ADVNC CARE PLAN IN RCRD: CPT | Mod: CPTII,S$GLB,, | Performed by: NURSE PRACTITIONER

## 2023-04-17 RX ORDER — PANTOPRAZOLE SODIUM 40 MG/1
TABLET, DELAYED RELEASE ORAL
Status: ON HOLD | COMMUNITY
End: 2023-05-31 | Stop reason: HOSPADM

## 2023-04-17 RX ORDER — BLOOD-GLUCOSE,RECEIVER,CONT
EACH MISCELLANEOUS
Qty: 1 EACH | Refills: 0 | Status: SHIPPED | OUTPATIENT
Start: 2023-04-17 | End: 2023-06-15

## 2023-04-17 RX ORDER — MECLIZINE HCL 12.5 MG 12.5 MG/1
TABLET ORAL
COMMUNITY
End: 2023-06-26

## 2023-04-17 RX ORDER — INSULIN DEGLUDEC 200 U/ML
76 INJECTION, SOLUTION SUBCUTANEOUS DAILY
Qty: 12 PEN | Refills: 3
Start: 2023-04-17 | End: 2023-08-21

## 2023-04-17 RX ORDER — INSULIN ASPART 100 [IU]/ML
INJECTION, SOLUTION INTRAVENOUS; SUBCUTANEOUS
Qty: 45 ML | Refills: 11
Start: 2023-04-17 | End: 2023-11-01

## 2023-04-17 RX ORDER — BLOOD-GLUCOSE SENSOR
EACH MISCELLANEOUS
Qty: 9 EACH | Refills: 4 | Status: SHIPPED | OUTPATIENT
Start: 2023-04-17 | End: 2023-11-01 | Stop reason: SDUPTHER

## 2023-04-17 NOTE — PROGRESS NOTES
CC: This 63 y.o. female  presents for management of diabetes  and chronic conditions pending review including HTN, HLP, ESRD s/p kidney txp 01/14/2019, hypothyroidism, vitamin d deficiency, obesity      HPI: She  was diagnosed with T2DM in 2005. Has never been hospitalized r/t DM.  Family hx of DM: grandmother    Patient last seen by me in 1/2020, seen once by A Joselito NP in 4/2022  Stent placed ~ a year ago  Did finally get a pain pump for her back- still does have issues with right sided back pain  Also now having tremors in her hands and her feet  Chest pain last Monday, seen in ER, did have an episode this am but did not take her NTG, currently resolved  Has been more tired, decreased appetite    Wearing Cody- See download in Media tab  Time in range: 81%  Hypoglycemia: <1%   Occasional bg dip overnight or post lunch  Is taking Novolog in am and not eating breakfast   Diet: Eats 2 Meals a day, snacks- chocolate, PB crackers, cheese crackers, pickles, okra   Skips breakfast but still taking Novolog  Lunch- 2 soft tacos or grilled cheese sandwich or PB crackers  D- protein, 1/2 potato , limited vegetable   Exercise: none r/t back pain  CURRENT DM MEDS: Tresiba U200 80u qd, Novolog 32 u AC + correction, metformin xr 500 mg bid  Timing prandial insulin 5-15 minutes before meals: yes  Vial/pen:  Uses pens    Standards of Care:  Eye exam: ?    ROS:   Gen: Appetite fair. + back pain  Eyes: Denies visual disturbances  Resp: no SOB or BOYER, no cough  Cardiac: No palpitations, +chest pain- as noted above, no edema   GI: + nausea &vomiting   /GYN: No nocturia, burning or pain.   MS/Neuro: Denies numbness/ tingling in BLE; Gait steady, speech clear    PE:  GENERAL: Well developed, well nourished.  PSYCH: AAOx3, appropriate mood and affect, pleasant expression, conversant, appears relaxed, well groomed.   EYES: Conjunctiva, corneas clear  NECK: Supple, trachea midline,   VASCULAR: DP pulses +2/4 bilaterally, no  "edema.  NEURO: Gait steady  SKIN:   no acanthosis nigracans.  FOOT EXAMINATION: 4/17/2023  No foot deformity, corns or callus formation,  nails in good condition and well trimmed, no interspace maceration or ulceration noted.  Decreased hair growth present over toes/feet.  Protective sensation intact with 10 gram monofilament.  +2 dorsalis pedis and posterior pulses noted.     Personally reviewed Past Medical, Surgical, Social History.    BP (!) 140/68 (BP Location: Right arm, Patient Position: Sitting, BP Method: Large (Manual))   Pulse 67   Ht 5' 7" (1.702 m)   Wt 93.2 kg (205 lb 5.7 oz)   LMP 02/28/2012   SpO2 97%   BMI 32.16 kg/m²      Personally reviewed the below labs:      Chemistry        Component Value Date/Time     01/04/2023 1023    K 4.9 01/11/2023 1314     01/04/2023 1023    CO2 29 01/04/2023 1023    BUN 18 01/04/2023 1023    CREATININE 1.0 01/04/2023 1023     (H) 01/04/2023 1023        Component Value Date/Time    CALCIUM 10.2 01/04/2023 1023    ALKPHOS 122 01/04/2023 1023    AST 57 (H) 01/04/2023 1023    ALT 53 (H) 01/04/2023 1023    BILITOT 0.4 01/04/2023 1023    ESTGFRAFRICA >60.0 06/09/2022 0954    EGFRNONAA 59.7 (A) 06/09/2022 0954            Lab Results   Component Value Date    TSH 0.934 05/19/2022       Recent Labs   Lab 08/31/22  0541   LDL Cholesterol 96.4   HDL 20 L   Cholesterol 156        Results for orders placed or performed in visit on 11/17/21   Vitamin D   Result Value Ref Range    Vit D, 25-Hydroxy 42 30 - 96 ng/mL     No results found. However, due to the size of the patient record, not all encounters were searched. Please check Results Review for a complete set of results.    Lab Results   Component Value Date    MICALBCREAT 8.1 01/04/2023       Hemoglobin A1C   Date Value Ref Range Status   01/04/2023 7.5 (H) 4.0 - 5.6 % Final     Comment:     ADA Screening Guidelines:  5.7-6.4%  Consistent with prediabetes  >or=6.5%  Consistent with diabetes    High " levels of fetal hemoglobin interfere with the HbA1C  assay. Heterozygous hemoglobin variants (HbS, HgC, etc)do  not significantly interfere with this assay.   However, presence of multiple variants may affect accuracy.     08/30/2022 8.3 (H) 0.0 - 5.6 % Final     Comment:     Reference Interval:  5.0 - 5.6 Normal   5.7 - 6.4 High Risk   > 6.5 Diabetic      Hgb A1c results are standardized based on the (NGSP) National   Glycohemoglobin Standardization Program.      Hemoglobin A1C levels are related to mean serum/plasma glucose   during the preceding 2-3 months.        08/30/2022 8.3 (H) 4.0 - 5.6 % Final     Comment:     ADA Screening Guidelines:  5.7-6.4%  Consistent with prediabetes  >or=6.5%  Consistent with diabetes    High levels of fetal hemoglobin interfere with the HbA1C  assay. Heterozygous hemoglobin variants (HbS, HgC, etc)do  not significantly interfere with this assay.   However, presence of multiple variants may affect accuracy.          ASSESSMENT and PLAN:      1. 1. T2DM with hyperglycemia, CKD 3-  A1C to be drawn next week  Decrease Tresiba U200  to 76 u qd, Novolog 32 breakfast and supper, 28 u lunch  + correction, metformin xr 500 mg bid   ONLY take Novolog 5-15 mins prior to a meal, if skipping a meal, skip Novolog  Upgrade to Dexcom G7  Hold GLP-1 until she sees gastro as she's having so much nausea w episodes of vomting  SGLT2 will need to be approved by [facility]/nephrology       2. HTN - controlled, continue meds as previously prescribed and monitor.      3. HLP - on statin therapy     4. S/p kidney txp- followed by [facility]     5.  Immunosuppression - agents may increase bg readings     6. Hypothyroidism - takes with all other meds, check lab next week     7. Obesity- Exercise as tolerated, Body mass index is 32.16 kg/m².

## 2023-04-17 NOTE — TELEPHONE ENCOUNTER
----- Message from Valery Flowers sent at 4/17/2023  1:52 PM CDT -----  Regarding: Pt call back  Name of Who is Calling: VIRGIE DUKE [[id number]]      What is the request in detail: Pt would like an appt as soon as possible. Chest pains. She's been to the ER and has been advised to follow up.       Can the clinic reply by [facility]: no       What Number to Call Back if not in [facility]: [phone]

## 2023-04-25 ENCOUNTER — TELEPHONE (OUTPATIENT)
Dept: TRANSPLANT | Facility: CLINIC | Age: 63
End: 2023-04-25

## 2023-04-25 NOTE — TELEPHONE ENCOUNTER
"Return call to patients significant other stating that they recently adopted a puppy that was Diagnose with   coccidia. Signs and symptoms of  coccidia in humans reviewed and will keep coordinator posted.  ----- Message from Lissy Enriquez RN sent at 4/24/2023  5:02 PM CDT -----    ----- Message -----  From: Jared Bailey  Sent: 4/24/2023   1:44 PM CDT  To: [facility]    Consult/Advisory:          Name Of Caller: Any Alvarez (Friend)       Contact Preference?:  [phone] (Mobile)      What is the nature of the call?: Calling to speak w/ Oracio. Inquiring about potential risk from pt's exposure to a dog that was recently diagnosed with Coccidia          Additional Notes:  "Thank you for all that you do for our patients"        "

## 2023-04-26 ENCOUNTER — LAB VISIT (OUTPATIENT)
Dept: LAB | Facility: HOSPITAL | Age: 63
End: 2023-04-26
Attending: NURSE PRACTITIONER
Payer: MEDICARE

## 2023-04-26 DIAGNOSIS — E11.69 MIXED DIABETIC HYPERLIPIDEMIA ASSOCIATED WITH TYPE 2 DIABETES MELLITUS: Chronic | ICD-10-CM

## 2023-04-26 DIAGNOSIS — E11.8 CONTROLLED TYPE 2 DIABETES MELLITUS WITH COMPLICATION, WITH LONG-TERM CURRENT USE OF INSULIN: Chronic | ICD-10-CM

## 2023-04-26 DIAGNOSIS — E11.22 TYPE 2 DIABETES MELLITUS WITH STAGE 3A CHRONIC KIDNEY DISEASE, WITH LONG-TERM CURRENT USE OF INSULIN: ICD-10-CM

## 2023-04-26 DIAGNOSIS — R30.9 PAINFUL URINATION: ICD-10-CM

## 2023-04-26 DIAGNOSIS — Z79.4 TYPE 2 DIABETES MELLITUS WITH STAGE 3A CHRONIC KIDNEY DISEASE, WITH LONG-TERM CURRENT USE OF INSULIN: ICD-10-CM

## 2023-04-26 DIAGNOSIS — E78.2 MIXED DIABETIC HYPERLIPIDEMIA ASSOCIATED WITH TYPE 2 DIABETES MELLITUS: Chronic | ICD-10-CM

## 2023-04-26 DIAGNOSIS — Z79.60 LONG-TERM USE OF IMMUNOSUPPRESSANT MEDICATION: Chronic | ICD-10-CM

## 2023-04-26 DIAGNOSIS — N18.31 TYPE 2 DIABETES MELLITUS WITH STAGE 3A CHRONIC KIDNEY DISEASE, WITH LONG-TERM CURRENT USE OF INSULIN: ICD-10-CM

## 2023-04-26 DIAGNOSIS — E03.9 ACQUIRED HYPOTHYROIDISM: Chronic | ICD-10-CM

## 2023-04-26 DIAGNOSIS — Z79.4 CONTROLLED TYPE 2 DIABETES MELLITUS WITH COMPLICATION, WITH LONG-TERM CURRENT USE OF INSULIN: Chronic | ICD-10-CM

## 2023-04-26 DIAGNOSIS — Z94.0 KIDNEY TRANSPLANT RECIPIENT: ICD-10-CM

## 2023-04-26 LAB
ALBUMIN SERPL BCP-MCNC: 3.8 G/DL (ref 3.5–5.2)
ALP SERPL-CCNC: 120 U/L (ref 55–135)
ALT SERPL W/O P-5'-P-CCNC: 53 U/L (ref 10–44)
ANION GAP SERPL CALC-SCNC: 11 MMOL/L (ref 8–16)
AST SERPL-CCNC: 76 U/L (ref 10–40)
BACTERIA #/AREA URNS AUTO: ABNORMAL /HPF
BASOPHILS # BLD AUTO: 0.04 K/UL (ref 0–0.2)
BASOPHILS NFR BLD: 0.7 % (ref 0–1.9)
BILIRUB SERPL-MCNC: 0.6 MG/DL (ref 0.1–1)
BILIRUB UR QL STRIP: NEGATIVE
BUN SERPL-MCNC: 17 MG/DL (ref 8–23)
CALCIUM SERPL-MCNC: 10.6 MG/DL (ref 8.7–10.5)
CHLORIDE SERPL-SCNC: 101 MMOL/L (ref 95–110)
CHOLEST SERPL-MCNC: 81 MG/DL (ref 120–199)
CHOLEST/HDLC SERPL: 3 {RATIO} (ref 2–5)
CLARITY UR REFRACT.AUTO: ABNORMAL
CO2 SERPL-SCNC: 31 MMOL/L (ref 23–29)
COLOR UR AUTO: YELLOW
CREAT SERPL-MCNC: 1.3 MG/DL (ref 0.5–1.4)
DIFFERENTIAL METHOD: ABNORMAL
EOSINOPHIL # BLD AUTO: 0.8 K/UL (ref 0–0.5)
EOSINOPHIL NFR BLD: 14 % (ref 0–8)
ERYTHROCYTE [DISTWIDTH] IN BLOOD BY AUTOMATED COUNT: 14.9 % (ref 11.5–14.5)
EST. GFR  (NO RACE VARIABLE): 45.6 ML/MIN/1.73 M^2
ESTIMATED AVG GLUCOSE: 154 MG/DL (ref 68–131)
GLUCOSE SERPL-MCNC: 123 MG/DL (ref 70–110)
GLUCOSE UR QL STRIP: NEGATIVE
HBA1C MFR BLD: 7 % (ref 4–5.6)
HCT VFR BLD AUTO: 37.6 % (ref 37–48.5)
HDLC SERPL-MCNC: 27 MG/DL (ref 40–75)
HDLC SERPL: 33.3 % (ref 20–50)
HGB BLD-MCNC: 11.5 G/DL (ref 12–16)
HGB UR QL STRIP: NEGATIVE
IMM GRANULOCYTES # BLD AUTO: 0.01 K/UL (ref 0–0.04)
IMM GRANULOCYTES NFR BLD AUTO: 0.2 % (ref 0–0.5)
KETONES UR QL STRIP: NEGATIVE
LDLC SERPL CALC-MCNC: 27.6 MG/DL (ref 63–159)
LEUKOCYTE ESTERASE UR QL STRIP: ABNORMAL
LYMPHOCYTES # BLD AUTO: 1.1 K/UL (ref 1–4.8)
LYMPHOCYTES NFR BLD: 18.5 % (ref 18–48)
MCH RBC QN AUTO: 26.9 PG (ref 27–31)
MCHC RBC AUTO-ENTMCNC: 30.6 G/DL (ref 32–36)
MCV RBC AUTO: 88 FL (ref 82–98)
MICROSCOPIC COMMENT: ABNORMAL
MONOCYTES # BLD AUTO: 0.5 K/UL (ref 0.3–1)
MONOCYTES NFR BLD: 8.1 % (ref 4–15)
NEUTROPHILS # BLD AUTO: 3.4 K/UL (ref 1.8–7.7)
NEUTROPHILS NFR BLD: 58.5 % (ref 38–73)
NITRITE UR QL STRIP: NEGATIVE
NON-SQ EPI CELLS #/AREA URNS AUTO: 1 /HPF
NONHDLC SERPL-MCNC: 54 MG/DL
NRBC BLD-RTO: 0 /100 WBC
PH UR STRIP: 7 [PH] (ref 5–8)
PLATELET # BLD AUTO: 172 K/UL (ref 150–450)
PMV BLD AUTO: 11 FL (ref 9.2–12.9)
POTASSIUM SERPL-SCNC: 4.6 MMOL/L (ref 3.5–5.1)
PROT SERPL-MCNC: 7.5 G/DL (ref 6–8.4)
PROT UR QL STRIP: NEGATIVE
RBC # BLD AUTO: 4.27 M/UL (ref 4–5.4)
RBC #/AREA URNS AUTO: 10 /HPF (ref 0–4)
SODIUM SERPL-SCNC: 143 MMOL/L (ref 136–145)
SP GR UR STRIP: 1.01 (ref 1–1.03)
SQUAMOUS #/AREA URNS AUTO: 8 /HPF
TRIGL SERPL-MCNC: 132 MG/DL (ref 30–150)
TSH SERPL DL<=0.005 MIU/L-ACNC: 1.91 UIU/ML (ref 0.4–4)
URN SPEC COLLECT METH UR: ABNORMAL
WBC # BLD AUTO: 5.78 K/UL (ref 3.9–12.7)
WBC #/AREA URNS AUTO: 88 /HPF (ref 0–5)

## 2023-04-26 PROCEDURE — 80197 ASSAY OF TACROLIMUS: CPT | Performed by: FAMILY MEDICINE

## 2023-04-26 PROCEDURE — 85025 COMPLETE CBC W/AUTO DIFF WBC: CPT | Performed by: FAMILY MEDICINE

## 2023-04-26 PROCEDURE — 83036 HEMOGLOBIN GLYCOSYLATED A1C: CPT | Performed by: FAMILY MEDICINE

## 2023-04-26 PROCEDURE — 81001 URINALYSIS AUTO W/SCOPE: CPT | Performed by: FAMILY MEDICINE

## 2023-04-26 PROCEDURE — 84443 ASSAY THYROID STIM HORMONE: CPT | Performed by: NURSE PRACTITIONER

## 2023-04-26 PROCEDURE — 80053 COMPREHEN METABOLIC PANEL: CPT | Performed by: FAMILY MEDICINE

## 2023-04-26 PROCEDURE — 80061 LIPID PANEL: CPT | Performed by: NURSE PRACTITIONER

## 2023-04-26 PROCEDURE — 36415 COLL VENOUS BLD VENIPUNCTURE: CPT | Mod: PN | Performed by: FAMILY MEDICINE

## 2023-04-27 ENCOUNTER — TELEPHONE (OUTPATIENT)
Dept: PRIMARY CARE CLINIC | Facility: CLINIC | Age: 63
End: 2023-04-27

## 2023-04-27 DIAGNOSIS — N39.0 URINARY TRACT INFECTION WITHOUT HEMATURIA, SITE UNSPECIFIED: Primary | ICD-10-CM

## 2023-04-27 LAB — TACROLIMUS BLD-MCNC: 6.1 NG/ML (ref 5–15)

## 2023-04-27 NOTE — TELEPHONE ENCOUNTER
Let us go ahead and check a urine culture.  Order entered.    I definitely recommend cutting back on her usage of calcium carbonate/Tums

## 2023-04-27 NOTE — TELEPHONE ENCOUNTER
Please call to discuss labs.  I did send a detailed explanation through my chart.  Although her urinalysis was abnormal and I did not order a culture.  Please see if she is having symptoms suggestive of a UTI.

## 2023-04-27 NOTE — TELEPHONE ENCOUNTER
Pt reports that for the past month, she has been having symptoms of having a full bladder, but she actually doesn't. Symptoms last for a few days, resolve, and the return. Encouraged pt to not wait a month if having UTI symptoms and to call us immediately next time this happens to her, she verbalized understanding.     Waiting to hear back from nephrologist about the increase in her creatinine.     Pt reports that she is taking about 15 tums per day. Pt sees GI next week and will discuss.     Preferred pharmacy: [facility] [address]

## 2023-04-28 ENCOUNTER — LAB VISIT (OUTPATIENT)
Dept: LAB | Facility: HOSPITAL | Age: 63
End: 2023-04-28
Attending: FAMILY MEDICINE
Payer: MEDICARE

## 2023-04-28 DIAGNOSIS — N39.0 URINARY TRACT INFECTION WITHOUT HEMATURIA, SITE UNSPECIFIED: ICD-10-CM

## 2023-04-28 PROCEDURE — 87186 SC STD MICRODIL/AGAR DIL: CPT | Performed by: FAMILY MEDICINE

## 2023-04-28 PROCEDURE — 87086 URINE CULTURE/COLONY COUNT: CPT | Performed by: FAMILY MEDICINE

## 2023-04-28 PROCEDURE — 87088 URINE BACTERIA CULTURE: CPT | Performed by: FAMILY MEDICINE

## 2023-04-28 PROCEDURE — 87077 CULTURE AEROBIC IDENTIFY: CPT | Performed by: FAMILY MEDICINE

## 2023-05-01 ENCOUNTER — TELEPHONE (OUTPATIENT)
Dept: PRIMARY CARE CLINIC | Facility: CLINIC | Age: 63
End: 2023-05-01

## 2023-05-01 LAB — BACTERIA UR CULT: ABNORMAL

## 2023-05-01 RX ORDER — CEPHALEXIN 500 MG/1
500 CAPSULE ORAL 3 TIMES DAILY
Qty: 21 CAPSULE | Refills: 0 | Status: SHIPPED | OUTPATIENT
Start: 2023-05-01 | End: 2023-05-08

## 2023-05-01 NOTE — TELEPHONE ENCOUNTER
Please call regarding urinalysis and culture.  The urine culture is positive.  I sent in a prescription for cephalexin.  Please let her know and have her start the antibiotic as soon as possible.

## 2023-05-03 ENCOUNTER — OFFICE VISIT (OUTPATIENT)
Dept: PRIMARY CARE CLINIC | Facility: CLINIC | Age: 63
End: 2023-05-03
Payer: MEDICARE

## 2023-05-03 DIAGNOSIS — N39.0 URINARY TRACT INFECTION WITHOUT HEMATURIA, SITE UNSPECIFIED: Primary | ICD-10-CM

## 2023-05-03 DIAGNOSIS — E11.8 CONTROLLED TYPE 2 DIABETES MELLITUS WITH COMPLICATION, WITH LONG-TERM CURRENT USE OF INSULIN: ICD-10-CM

## 2023-05-03 DIAGNOSIS — Z79.4 CONTROLLED TYPE 2 DIABETES MELLITUS WITH COMPLICATION, WITH LONG-TERM CURRENT USE OF INSULIN: ICD-10-CM

## 2023-05-03 DIAGNOSIS — E83.52 HYPERCALCEMIA: ICD-10-CM

## 2023-05-03 PROCEDURE — 1159F PR MEDICATION LIST DOCUMENTED IN MEDICAL RECORD: ICD-10-PCS | Mod: CPTII,S$GLB,, | Performed by: FAMILY MEDICINE

## 2023-05-03 PROCEDURE — 1160F RVW MEDS BY RX/DR IN RCRD: CPT | Mod: CPTII,S$GLB,, | Performed by: FAMILY MEDICINE

## 2023-05-03 PROCEDURE — 99214 PR OFFICE/OUTPT VISIT, EST, LEVL IV, 30-39 MIN: ICD-10-PCS | Mod: S$GLB,,, | Performed by: FAMILY MEDICINE

## 2023-05-03 PROCEDURE — 1157F ADVNC CARE PLAN IN RCRD: CPT | Mod: CPTII,S$GLB,, | Performed by: FAMILY MEDICINE

## 2023-05-03 PROCEDURE — 1160F PR REVIEW ALL MEDS BY PRESCRIBER/CLIN PHARMACIST DOCUMENTED: ICD-10-PCS | Mod: CPTII,S$GLB,, | Performed by: FAMILY MEDICINE

## 2023-05-03 PROCEDURE — 3078F PR MOST RECENT DIASTOLIC BLOOD PRESSURE < 80 MM HG: ICD-10-PCS | Mod: CPTII,S$GLB,, | Performed by: FAMILY MEDICINE

## 2023-05-03 PROCEDURE — 1157F PR ADVANCE CARE PLAN OR EQUIV PRESENT IN MEDICAL RECORD: ICD-10-PCS | Mod: CPTII,S$GLB,, | Performed by: FAMILY MEDICINE

## 2023-05-03 PROCEDURE — 3066F PR DOCUMENTATION OF TREATMENT FOR NEPHROPATHY: ICD-10-PCS | Mod: CPTII,S$GLB,, | Performed by: FAMILY MEDICINE

## 2023-05-03 PROCEDURE — 3078F DIAST BP <80 MM HG: CPT | Mod: CPTII,S$GLB,, | Performed by: FAMILY MEDICINE

## 2023-05-03 PROCEDURE — 3061F NEG MICROALBUMINURIA REV: CPT | Mod: CPTII,S$GLB,, | Performed by: FAMILY MEDICINE

## 2023-05-03 PROCEDURE — 3051F PR MOST RECENT HEMOGLOBIN A1C LEVEL 7.0 - < 8.0%: ICD-10-PCS | Mod: CPTII,S$GLB,, | Performed by: FAMILY MEDICINE

## 2023-05-03 PROCEDURE — 99214 OFFICE O/P EST MOD 30 MIN: CPT | Mod: S$GLB,,, | Performed by: FAMILY MEDICINE

## 2023-05-03 PROCEDURE — 4010F ACE/ARB THERAPY RXD/TAKEN: CPT | Mod: CPTII,S$GLB,, | Performed by: FAMILY MEDICINE

## 2023-05-03 PROCEDURE — 3061F PR NEG MICROALBUMINURIA RESULT DOCUMENTED/REVIEW: ICD-10-PCS | Mod: CPTII,S$GLB,, | Performed by: FAMILY MEDICINE

## 2023-05-03 PROCEDURE — 3008F BODY MASS INDEX DOCD: CPT | Mod: CPTII,S$GLB,, | Performed by: FAMILY MEDICINE

## 2023-05-03 PROCEDURE — 1159F MED LIST DOCD IN RCRD: CPT | Mod: CPTII,S$GLB,, | Performed by: FAMILY MEDICINE

## 2023-05-03 PROCEDURE — 99999 PR PBB SHADOW E&M-EST. PATIENT-LVL III: ICD-10-PCS | Mod: PBBFAC,,, | Performed by: FAMILY MEDICINE

## 2023-05-03 PROCEDURE — 3075F SYST BP GE 130 - 139MM HG: CPT | Mod: CPTII,S$GLB,, | Performed by: FAMILY MEDICINE

## 2023-05-03 PROCEDURE — 4010F PR ACE/ARB THEARPY RXD/TAKEN: ICD-10-PCS | Mod: CPTII,S$GLB,, | Performed by: FAMILY MEDICINE

## 2023-05-03 PROCEDURE — 99999 PR PBB SHADOW E&M-EST. PATIENT-LVL III: CPT | Mod: PBBFAC,,, | Performed by: FAMILY MEDICINE

## 2023-05-03 PROCEDURE — 3075F PR MOST RECENT SYSTOLIC BLOOD PRESS GE 130-139MM HG: ICD-10-PCS | Mod: CPTII,S$GLB,, | Performed by: FAMILY MEDICINE

## 2023-05-03 PROCEDURE — 3051F HG A1C>EQUAL 7.0%<8.0%: CPT | Mod: CPTII,S$GLB,, | Performed by: FAMILY MEDICINE

## 2023-05-03 PROCEDURE — 3008F PR BODY MASS INDEX (BMI) DOCUMENTED: ICD-10-PCS | Mod: CPTII,S$GLB,, | Performed by: FAMILY MEDICINE

## 2023-05-03 PROCEDURE — 3066F NEPHROPATHY DOC TX: CPT | Mod: CPTII,S$GLB,, | Performed by: FAMILY MEDICINE

## 2023-05-03 RX ORDER — ZINC GLUCONATE 50 MG
TABLET ORAL
COMMUNITY
End: 2023-06-26

## 2023-05-03 NOTE — PROGRESS NOTES
THIS DOCUMENT WAS MADE IN PART WITH VOICE RECOGNITION SOFTWARE.  OCCASIONALLY THIS SOFTWARE WILL MISINTERPRET WORDS OR PHRASES.      Primary Care Provider Appointment   [facility]       Patient ID: Andra Newell is a 63 y.o. female.    ASSESSMENT/PLAN by Problem List:    1. Urinary tract infection without hematuria, site unspecified  Assessment & Plan:  Symptoms improved on antibiotics.      2. Controlled type 2 diabetes mellitus with complication, with long-term current use of insulin  Assessment & Plan:  Endo recommended Jardiance, waiting on nephrology to see if they feel ok with h/o of transplant.  I think this would be an excellent option and hopefully help reduce her insulin requirements.      3. Hypercalcemia  Assessment & Plan:    Mild increase in ca+, reducing tums, was taking up to 20 / day, has appt with GI, remains on pepcid and pantoprazole             Follow Up:  Three months    Subjective:     Chief Complaint   Patient presents with    Follow-up     I have reviewed the information entered by the ancillary staff regarding the chief complaint as well as the related history.    HPI    Patient is a/an 63 y.o.  female     Follow-up.  Recent UTI symptoms, treated over the phone, much improved.  Diabetes remains stable.  Kidney function remains stable.  See above for details    For complete problem list, past medical history, surgical history, social history, etc., see appropriate section in the electronic medical record    Review of Systems   Constitutional:  Negative for activity change and unexpected weight change.   HENT:  Negative for hearing loss, rhinorrhea and trouble swallowing.    Eyes:  Negative for discharge and visual disturbance.   Respiratory:  Positive for chest tightness. Negative for wheezing.    Cardiovascular:  Negative for chest pain and palpitations.   Gastrointestinal:  Negative for blood in stool, constipation, diarrhea and vomiting.    Endocrine: Negative for polydipsia and polyuria.   Genitourinary:  Positive for difficulty urinating. Negative for dysuria, hematuria and menstrual problem.   Musculoskeletal:  Positive for neck pain. Negative for arthralgias and joint swelling.   Neurological:  Negative for weakness and headaches.   Psychiatric/Behavioral:  Negative for confusion and dysphoric mood.      Objective     Physical Exam  HENT:      Head: Normocephalic and atraumatic.      Mouth/Throat:      Mouth: Mucous membranes are moist.   Eyes:      General: No scleral icterus.     Conjunctiva/sclera: Conjunctivae normal.   Cardiovascular:      Rate and Rhythm: Normal rate and regular rhythm.      Heart sounds: Murmur heard.   Systolic murmur is present with a grade of 2/6.     No friction rub.      Comments: There is no peripheral edema.  Pulmonary:      Effort: Pulmonary effort is normal. No respiratory distress.      Breath sounds: Normal breath sounds. No wheezing or rales.   Neurological:      General: No focal deficit present.   Psychiatric:         Mood and Affect: Mood normal.     Vitals:    05/03/23 1359   BP: 130/76   BP Location: Right arm   Patient Position: Sitting   BP Method: Large (Manual)   Pulse: 82   Resp: 20   Temp: 98.4 °F (36.9 °C)   TempSrc: Oral   SpO2: 97%   Weight: 92.6 kg (204 lb 2.3 oz)

## 2023-05-03 NOTE — PLAN OF CARE
Pt aaox3.  Bed in low and locked position.  Call bell, phone, food, drink within reach in bed or on bedside table.  Wife at bedside and active in care.  Pulling self meds 100% accuracy.  Dilaudid pca dc'd earlier in shift and has since gotten prn po oxy x2.  R ktx surgical drsg removed per md this am.  Gauze drsg placed due to leakage.  Grace draining 125-250cc/hr.  At one part in shift became dislodged and had some unmeasured uop.  At this time, UOP and vitals changed to Q4hrs, cvp Q 8hrs.  IVF stopped.  PT/OT consulted as prior to [facility], pt used electric scooter for mobility.  Up to recliner for approx 3 hrs this afternoon.  Endocrine cx.  GI cocktail and benadryl ordered prn.  accuchecks ac/hs.  Left +/+.  Right IJ quad CDI.  See flowsheet for full assessment and details.     vitamin B12 level and continue supplement for now

## 2023-05-08 ENCOUNTER — LAB VISIT (OUTPATIENT)
Dept: LAB | Facility: HOSPITAL | Age: 63
End: 2023-05-08
Attending: INTERNAL MEDICINE
Payer: MEDICARE

## 2023-05-08 DIAGNOSIS — Z94.0 KIDNEY REPLACED BY TRANSPLANT: Primary | ICD-10-CM

## 2023-05-08 LAB
ALBUMIN SERPL BCP-MCNC: 3.5 G/DL (ref 3.5–5.2)
ANION GAP SERPL CALC-SCNC: 5 MMOL/L (ref 8–16)
BACTERIA #/AREA URNS AUTO: ABNORMAL /HPF
BUN SERPL-MCNC: 14 MG/DL (ref 8–23)
CALCIUM SERPL-MCNC: 9.8 MG/DL (ref 8.7–10.5)
CHLORIDE SERPL-SCNC: 105 MMOL/L (ref 95–110)
CO2 SERPL-SCNC: 32 MMOL/L (ref 23–29)
CREAT SERPL-MCNC: 1 MG/DL (ref 0.5–1.4)
EST. GFR  (NO RACE VARIABLE): >60 ML/MIN/1.73 M^2
GLUCOSE SERPL-MCNC: 142 MG/DL (ref 70–110)
MICROSCOPIC COMMENT: ABNORMAL
NON-SQ EPI CELLS #/AREA URNS AUTO: 1 /HPF
PHOSPHATE SERPL-MCNC: 4.3 MG/DL (ref 2.7–4.5)
POTASSIUM SERPL-SCNC: 4.7 MMOL/L (ref 3.5–5.1)
RBC #/AREA URNS AUTO: 2 /HPF (ref 0–4)
SODIUM SERPL-SCNC: 142 MMOL/L (ref 136–145)
SQUAMOUS #/AREA URNS AUTO: 6 /HPF
WBC #/AREA URNS AUTO: 14 /HPF (ref 0–5)

## 2023-05-08 PROCEDURE — 80069 RENAL FUNCTION PANEL: CPT | Performed by: INTERNAL MEDICINE

## 2023-05-08 PROCEDURE — 36415 COLL VENOUS BLD VENIPUNCTURE: CPT | Mod: PN | Performed by: INTERNAL MEDICINE

## 2023-05-08 PROCEDURE — 81001 URINALYSIS AUTO W/SCOPE: CPT | Performed by: INTERNAL MEDICINE

## 2023-05-09 NOTE — ASSESSMENT & PLAN NOTE
HPI: Patient is a 23 year old female who presents with complaints of ongoing cough for the past 8 days.  Patient states that she initially had a sore throat that went away.  On Thursday she started losing her voice along with a cough.  She does occasionally have sputum production at times but mostly describes it as a dry hacking cough.  She has been taking DayQuil at home for relief of her symptoms which she reports does help her sleep at night but otherwise the cough is consistent.  Denies any chest pain or shortness of breath.  No abdominal pain, nausea, vomiting or diarrhea.  Denies any sinus issues.  She is been tolerating food and fluid without issue.  Blood pressure noted to be elevated upon arrival, patient states that she is waiting to hear back about her final test results so has been having some anxiety and in relation to anticipation          ROS as noted per HPI above        Patient Active Problem List   Diagnosis   • No known health problems        PAST MEDICAL HX:    No known problems                                             Chronic tonsillitis                                           Seizure (CMD)                                                   Comment: isolated seizure 2017 per patient related to                dehydration, no followup was required per                patient     Gastroesophageal reflux disease                                 Comment: with spicy food per patient     PAST SURGICAL HX:    NO PAST SURGERIES                                             Social History     Tobacco Use   • Smoking status: Never   • Smokeless tobacco: Never   Vaping Use   • Vaping status: never used   Substance Use Topics   • Alcohol use: Yes     Alcohol/week: 5.0 standard drinks of alcohol     Types: 5 Standard drinks or equivalent per week     Comment: 5 or less per week    • Drug use: Never        Current Outpatient Medications   Medication Sig   • benzonatate (TESSALON PERLES) 100 MG capsule Take 1  - presented to Urology to have stent removed.  Urine dipstick in clinic + UTI.  Dr Garcia cancelled procedure.  Per Dr Garcia and CG, pt did NOT receive abx in clinic.    - Urine cx pending.  - Given dose of Rocephin on admit.  -However, previous cx resistent to Rocephin. Therefore, will start cipro while waiting final urine cx.   capsule by mouth 3 times daily as needed for Cough.   • methylPREDNISolone (MEDROL DOSEPAK) 4 MG tablet Take 1 tablet by mouth as directed. follow package directions   • tretinoin (RETIN-A) 0.05 % cream APPLY TO THE FACE EVERY NIGHT AT BEDTIME   • fexofenadine (ALLEGRA) 180 MG tablet Take 180 mg by mouth daily.   • JUNEL FE 1/20 1-20 MG-MCG per tablet Take 1 tablet by mouth daily.     No current facility-administered medications for this visit.         Vitals:    05/09/23 1038 05/09/23 1103   BP: (!) 188/85 (!) 140/75   Pulse: 89    Resp: 18    Temp: 98.8 °F (37.1 °C)    SpO2: 100%    Weight: 59 kg (130 lb)    Height: 5' 4\" (1.626 m)    LMP: 04/15/2023         Physical Exam  Constitutional:       General: She is not in acute distress.     Appearance: Normal appearance. She is not toxic-appearing.   HENT:      Head: Normocephalic and atraumatic.      Right Ear: Tympanic membrane and ear canal normal.      Left Ear: Tympanic membrane and ear canal normal.      Nose: Nose normal. No congestion.      Mouth/Throat:      Mouth: Mucous membranes are moist.      Pharynx: Oropharynx is clear. No oropharyngeal exudate or posterior oropharyngeal erythema.   Eyes:      Conjunctiva/sclera: Conjunctivae normal.   Cardiovascular:      Rate and Rhythm: Normal rate and regular rhythm.      Pulses: Normal pulses.      Heart sounds: Normal heart sounds.   Pulmonary:      Effort: Pulmonary effort is normal. No respiratory distress.      Breath sounds: Normal breath sounds. No wheezing.   Musculoskeletal:         General: Normal range of motion.   Lymphadenopathy:      Cervical: No cervical adenopathy.   Skin:     General: Skin is warm and dry.   Neurological:      General: No focal deficit present.      Mental Status: She is alert.   Psychiatric:         Mood and Affect: Mood normal.                       MDM:Based on my history, physical exam, and diagnostic evaluation, the patient appears to have symptoms consistent with Bronchitis.  They have a normal heart rate, normal oxygen saturation, a normal respiratory pattern and non-diagnostic exam. The pt appears to be in no distress, appears well-hydrated and is ambulating in the immediate care without difficulty. They will be discharged with instructions to go to the ER if difficulty breathing, not tolerating oral food or fluids, any respiratory distress, or new symptoms. I encouraged follow-up with the primary care physician for repeat exam in 24-48 hours.       Differential diagnosis include URI, pneumonia, bronchitis, pharyngitis.  I personally reviewed the patients old records.    ED Diagnosis     Diagnosis Comment Associated Orders       Final diagnosis    Acute cough --  BENZONATATE 100 MG PO CAPS   METHYLPREDNISOLONE 4 MG PO TBPK             New Prescriptions    BENZONATATE (TESSALON PERLES) 100 MG CAPSULE    Take 1 capsule by mouth 3 times daily as needed for Cough.    METHYLPREDNISOLONE (MEDROL DOSEPAK) 4 MG TABLET    Take 1 tablet by mouth as directed. follow package directions           Disposition: Home

## 2023-05-10 ENCOUNTER — OFFICE VISIT (OUTPATIENT)
Dept: GASTROENTEROLOGY | Facility: CLINIC | Age: 63
End: 2023-05-10
Payer: MEDICARE

## 2023-05-10 VITALS — WEIGHT: 203.94 LBS | RESPIRATION RATE: 18 BRPM | HEIGHT: 67 IN | BODY MASS INDEX: 32.01 KG/M2

## 2023-05-10 DIAGNOSIS — K21.9 GASTROESOPHAGEAL REFLUX DISEASE WITHOUT ESOPHAGITIS: ICD-10-CM

## 2023-05-10 DIAGNOSIS — R13.19 ESOPHAGEAL DYSPHAGIA: Primary | ICD-10-CM

## 2023-05-10 PROCEDURE — 1157F PR ADVANCE CARE PLAN OR EQUIV PRESENT IN MEDICAL RECORD: ICD-10-PCS | Mod: CPTII,S$GLB,, | Performed by: INTERNAL MEDICINE

## 2023-05-10 PROCEDURE — 4010F ACE/ARB THERAPY RXD/TAKEN: CPT | Mod: CPTII,S$GLB,, | Performed by: INTERNAL MEDICINE

## 2023-05-10 PROCEDURE — 3008F PR BODY MASS INDEX (BMI) DOCUMENTED: ICD-10-PCS | Mod: CPTII,S$GLB,, | Performed by: INTERNAL MEDICINE

## 2023-05-10 PROCEDURE — 3066F PR DOCUMENTATION OF TREATMENT FOR NEPHROPATHY: ICD-10-PCS | Mod: CPTII,S$GLB,, | Performed by: INTERNAL MEDICINE

## 2023-05-10 PROCEDURE — 99999 PR PBB SHADOW E&M-EST. PATIENT-LVL IV: CPT | Mod: PBBFAC,,, | Performed by: INTERNAL MEDICINE

## 2023-05-10 PROCEDURE — 3061F NEG MICROALBUMINURIA REV: CPT | Mod: CPTII,S$GLB,, | Performed by: INTERNAL MEDICINE

## 2023-05-10 PROCEDURE — 1159F MED LIST DOCD IN RCRD: CPT | Mod: CPTII,S$GLB,, | Performed by: INTERNAL MEDICINE

## 2023-05-10 PROCEDURE — 99214 PR OFFICE/OUTPT VISIT, EST, LEVL IV, 30-39 MIN: ICD-10-PCS | Mod: S$GLB,,, | Performed by: INTERNAL MEDICINE

## 2023-05-10 PROCEDURE — 3051F PR MOST RECENT HEMOGLOBIN A1C LEVEL 7.0 - < 8.0%: ICD-10-PCS | Mod: CPTII,S$GLB,, | Performed by: INTERNAL MEDICINE

## 2023-05-10 PROCEDURE — 3051F HG A1C>EQUAL 7.0%<8.0%: CPT | Mod: CPTII,S$GLB,, | Performed by: INTERNAL MEDICINE

## 2023-05-10 PROCEDURE — 1159F PR MEDICATION LIST DOCUMENTED IN MEDICAL RECORD: ICD-10-PCS | Mod: CPTII,S$GLB,, | Performed by: INTERNAL MEDICINE

## 2023-05-10 PROCEDURE — 3066F NEPHROPATHY DOC TX: CPT | Mod: CPTII,S$GLB,, | Performed by: INTERNAL MEDICINE

## 2023-05-10 PROCEDURE — 3008F BODY MASS INDEX DOCD: CPT | Mod: CPTII,S$GLB,, | Performed by: INTERNAL MEDICINE

## 2023-05-10 PROCEDURE — 4010F PR ACE/ARB THEARPY RXD/TAKEN: ICD-10-PCS | Mod: CPTII,S$GLB,, | Performed by: INTERNAL MEDICINE

## 2023-05-10 PROCEDURE — 99999 PR PBB SHADOW E&M-EST. PATIENT-LVL IV: ICD-10-PCS | Mod: PBBFAC,,, | Performed by: INTERNAL MEDICINE

## 2023-05-10 PROCEDURE — 1160F PR REVIEW ALL MEDS BY PRESCRIBER/CLIN PHARMACIST DOCUMENTED: ICD-10-PCS | Mod: CPTII,S$GLB,, | Performed by: INTERNAL MEDICINE

## 2023-05-10 PROCEDURE — 99214 OFFICE O/P EST MOD 30 MIN: CPT | Mod: S$GLB,,, | Performed by: INTERNAL MEDICINE

## 2023-05-10 PROCEDURE — 3061F PR NEG MICROALBUMINURIA RESULT DOCUMENTED/REVIEW: ICD-10-PCS | Mod: CPTII,S$GLB,, | Performed by: INTERNAL MEDICINE

## 2023-05-10 PROCEDURE — 1157F ADVNC CARE PLAN IN RCRD: CPT | Mod: CPTII,S$GLB,, | Performed by: INTERNAL MEDICINE

## 2023-05-10 PROCEDURE — 1160F RVW MEDS BY RX/DR IN RCRD: CPT | Mod: CPTII,S$GLB,, | Performed by: INTERNAL MEDICINE

## 2023-05-10 NOTE — PROGRESS NOTES
[facility] Note    CC: Dysphagia, GERD    HPI 63 y.o. female with multiple medical problems including renal transplant on Prograf, NAFLD and NSTEMI in August 2023 on Plavix, presents for follow up of chronic, progressive, dysphagia to solids. She also notes worsening of chronic GERD despite Omeprazole 40 mg in AM and Pecid at night. On this regimen she needs to take up to 15 Tums a day due to uncontrolled symptoms. Her last EGD was in June 2021, notable for Schatzki ring that was dilated (54 Fr), benign gastric polyps, H.pylori negative gastritis and peptic duodenitis. She is followed by Dr. Ugalde for NAFLD, fibroscan earlier this year with F4 fibrosis. His plan is to repeat fibroscan in several months.     Past Medical History:   Diagnosis Date    Anemia     Anemia in chronic kidney disease, on chronic dialysis 7/12/2017    Anxiety and depression     Aplastic anemia with parvovirus B19 infection 5/27/2019    Aplastic anemia with parvovirus B19 infection 4/2019 5/27/2019    Arthritis     Asthma     allergic airway    CKD stage III (moderate) 2/18/2019    Colon polyp     Depression     Diabetes mellitus     Diverticulosis     Encounter for blood transfusion     Eosinophilia     ESRD (end stage renal disease)     stage V, due for living donor 9/2018    ESRD on dialysis     GERD (gastroesophageal reflux disease)     Gout     Gout     Hyperlipidemia     Hypertension     Hypertriglyceridemia without hypercholesterolemia 6/9/2019    Low back pain     Low HDL (under 40) 6/9/2019    MRSA carrier     Neutropenia, unspecified 5/8/2019    Obesity     Renal disorder     Rotator cuff syndrome--left     Thyroid disease     Ulnar neuropathy of right upper extremity     Uncontrolled type 2 diabetes mellitus with hyperglycemia        Allergies and Medications reviewed     Review of Systems  General ROS: negative for - chills, fever or weight loss  Cardiovascular ROS: no chest pain or dyspnea on  "exertion  Gastrointestinal ROS: + GERD, + dysphagia, no hematemesis    Physical Examination  Resp 18   Ht 5' 7" (1.702 m)   Wt 92.5 kg (203 lb 14.8 oz)   LMP 02/28/2012   BMI 31.94 kg/m²   General appearance: alert, cooperative, no distress  HENT: Normocephalic, atraumatic, neck symmetrical, no nasal discharge, sclera anicteric   Lungs: clear to auscultation bilaterally, symmetric chest wall expansion bilaterally  Heart: regular rate and rhythm without rub; no displacement of the PMI   Abdomen: obese , soft, nontender,nondistended, BS active  Extremities: extremities symmetric; no clubbing, cyanosis, or edema        Labs:  Lab Results   Component Value Date    WBC 5.78 04/26/2023    HGB 11.5 (L) 04/26/2023    HCT 37.6 04/26/2023    MCV 88 04/26/2023     04/26/2023       CMP  Sodium   Date Value Ref Range Status   05/08/2023 142 136 - 145 mmol/L Final     Potassium   Date Value Ref Range Status   05/08/2023 4.7 3.5 - 5.1 mmol/L Final     Chloride   Date Value Ref Range Status   05/08/2023 105 95 - 110 mmol/L Final     CO2   Date Value Ref Range Status   05/08/2023 32 (H) 23 - 29 mmol/L Final     Glucose   Date Value Ref Range Status   05/08/2023 142 (H) 70 - 110 mg/dL Final     BUN   Date Value Ref Range Status   05/08/2023 14 8 - 23 mg/dL Final     Creatinine   Date Value Ref Range Status   05/08/2023 1.0 0.5 - 1.4 mg/dL Final     Calcium   Date Value Ref Range Status   05/08/2023 9.8 8.7 - 10.5 mg/dL Final     Total Protein   Date Value Ref Range Status   04/26/2023 7.5 6.0 - 8.4 g/dL Final     Albumin   Date Value Ref Range Status   05/08/2023 3.5 3.5 - 5.2 g/dL Final     Total Bilirubin   Date Value Ref Range Status   04/26/2023 0.6 0.1 - 1.0 mg/dL Final     Comment:     For infants and newborns, interpretation of results should be based  on gestational age, weight and in agreement with clinical  observations.    Premature Infant recommended reference ranges:  Up to 24 hours.............<8.0 mg/dL  Up " to 48 hours............<12.0 mg/dL  3-5 days..................<15.0 mg/dL  6-29 days.................<15.0 mg/dL       Alkaline Phosphatase   Date Value Ref Range Status   04/26/2023 120 55 - 135 U/L Final     AST   Date Value Ref Range Status   04/26/2023 76 (H) 10 - 40 U/L Final     ALT   Date Value Ref Range Status   04/26/2023 53 (H) 10 - 44 U/L Final     Anion Gap   Date Value Ref Range Status   05/08/2023 5 (L) 8 - 16 mmol/L Final     eGFR   Date Value Ref Range Status   05/08/2023 >60.0 >60 mL/min/1.73 m^2 Final         Assessment:   63 y.o. female with multiple medical problems including renal transplant on Prograf, NAFLD and NSTEMI several months ago with stent placement now on Aspirin/Plavix, presents for follow up of long-standing, daily, moderate to severe GERD and dysphagia to solids.     Plan:  -Schedule EGD- will message cardiology for clearance to hold Plavix  -Increase Omeprazole to 40 mg BID  -Schedule esophagram  -Keep follow up with hepatology       Marisol Bryson MD  [facility]  [address], [address]  [place] [address]  Office: [phone]  Fax: [phone]

## 2023-05-12 ENCOUNTER — TELEPHONE (OUTPATIENT)
Dept: CARDIOLOGY | Facility: CLINIC | Age: 63
End: 2023-05-12

## 2023-05-12 DIAGNOSIS — E78.2 MIXED DIABETIC HYPERLIPIDEMIA ASSOCIATED WITH TYPE 2 DIABETES MELLITUS: ICD-10-CM

## 2023-05-12 DIAGNOSIS — E11.69 MIXED DIABETIC HYPERLIPIDEMIA ASSOCIATED WITH TYPE 2 DIABETES MELLITUS: ICD-10-CM

## 2023-05-12 RX ORDER — METFORMIN HYDROCHLORIDE 500 MG/1
500 TABLET, EXTENDED RELEASE ORAL 2 TIMES DAILY WITH MEALS
Qty: 180 TABLET | Refills: 1 | Status: SHIPPED | OUTPATIENT
Start: 2023-05-12 | End: 2023-11-01 | Stop reason: SDUPTHER

## 2023-05-12 NOTE — TELEPHONE ENCOUNTER
----- Message from Tanesha Chand LPN sent at 5/12/2023  8:28 AM CDT -----  Regarding: RE: BLOOD THINNER CLEARANCE  I'm not sure how to send as an E consult. I'm sorry.   ----- Message -----  From: Marcelo Reyes LPN  Sent: 5/11/2023   9:08 AM CDT  To: Tanesha Chand LPN  Subject: FW: BLOOD THINNER CLEARANCE                        ----- Message -----  From: Bryant Werner MD  Sent: 5/11/2023   8:41 AM CDT  To: Debbi Hurtado Staff  Subject: FW: BLOOD THINNER CLEARANCE                      If they can send this request as an E consult directed towards me that would be preferable.  If not possible, let me know and I will do my best to optimize over epic.    -Bryant  ----- Message -----  From: Hailey Slaughter  Sent: 5/10/2023   4:23 PM CDT  To: Bryant Werner MD  Subject: FW: BLOOD THINNER CLEARANCE                      Last OV was with Sommer on 2/8, does this pt need to be seen for clearance?   ----- Message -----  From: Tanesha Chand LPN  Sent: 5/10/2023   4:22 PM CDT  To: Debbi Hurtado Staff  Subject: BLOOD THINNER CLEARANCE                          Ms. Newell is scheduled for an EGD on 5/31 with Dr. Bryson. We are requesting to hold her Plavix 5 days prior to her procedure. Please advise.

## 2023-05-12 NOTE — TELEPHONE ENCOUNTER
Refill Routing Note   Medication(s) are not appropriate for processing by [facility] for the following reason(s):      Drug-disease interaction:      [facility] action(s):  Defer Care Due:  None identified        Pharmacist review requested: Yes     Appointments  past 12m or future 3m with PCP    Date Provider   Last Visit   5/3/2023 Fady Vaughn MD   Next Visit   8/3/2023 Fady Vaughn MD   ED visits in past 90 days: 1        Note composed:10:09 AM 05/12/2023

## 2023-05-12 NOTE — TELEPHONE ENCOUNTER
Refill Decision Note   Andra Newell  is requesting a refill authorization.  Brief Assessment and Rationale for Refill:  Approve     Medication Therapy Plan:         Alert overridden per protocol: Yes   Pharmacist review requested: Yes   Comments:     Note composed:10:27 AM 05/12/2023

## 2023-05-12 NOTE — TELEPHONE ENCOUNTER
No care due was identified.  [facility] Embedded Care Due Messages. Reference number: [id number].   5/12/2023 9:58:27 AM CDT

## 2023-05-13 VITALS
BODY MASS INDEX: 31.97 KG/M2 | RESPIRATION RATE: 20 BRPM | SYSTOLIC BLOOD PRESSURE: 130 MMHG | TEMPERATURE: 98 F | WEIGHT: 204.13 LBS | OXYGEN SATURATION: 97 % | DIASTOLIC BLOOD PRESSURE: 76 MMHG | HEART RATE: 82 BPM

## 2023-05-13 NOTE — ASSESSMENT & PLAN NOTE
Endo recommended Jardiance, waiting on nephrology to see if they feel ok with h/o of transplant.  I think this would be an excellent option and hopefully help reduce her insulin requirements.

## 2023-05-13 NOTE — ASSESSMENT & PLAN NOTE
Mild increase in ca+, reducing tums, was taking up to 20 / day, has appt with GI, remains on pepcid and pantoprazole

## 2023-05-15 ENCOUNTER — PATIENT MESSAGE (OUTPATIENT)
Dept: TRANSPLANT | Facility: CLINIC | Age: 63
End: 2023-05-15

## 2023-05-15 RX ORDER — PEN NEEDLE, DIABETIC 32GX 5/32"
NEEDLE, DISPOSABLE MISCELLANEOUS
Qty: 400 EACH | Refills: 3 | Status: SHIPPED | OUTPATIENT
Start: 2023-05-15

## 2023-05-15 NOTE — TELEPHONE ENCOUNTER
Refill Decision Note   Andra Newell  is requesting a refill authorization.  Brief Assessment and Rationale for Refill:        Medication Therapy Plan:       Medication Reconciliation Completed: No   Comments:     No Care Gaps recommended.     Note composed:10:06 AM 05/15/2023

## 2023-05-15 NOTE — TELEPHONE ENCOUNTER
No care due was identified.  [facility] Embedded Care Due Messages. Reference number: [id number].   5/15/2023 10:04:29 AM CDT

## 2023-05-16 ENCOUNTER — TELEPHONE (OUTPATIENT)
Dept: ENDOCRINOLOGY | Facility: CLINIC | Age: 63
End: 2023-05-16

## 2023-05-16 ENCOUNTER — PATIENT MESSAGE (OUTPATIENT)
Dept: ENDOCRINOLOGY | Facility: CLINIC | Age: 63
End: 2023-05-16

## 2023-05-16 DIAGNOSIS — N18.31 TYPE 2 DIABETES MELLITUS WITH STAGE 3A CHRONIC KIDNEY DISEASE, WITH LONG-TERM CURRENT USE OF INSULIN: Primary | ICD-10-CM

## 2023-05-16 DIAGNOSIS — E11.22 TYPE 2 DIABETES MELLITUS WITH STAGE 3A CHRONIC KIDNEY DISEASE, WITH LONG-TERM CURRENT USE OF INSULIN: Primary | ICD-10-CM

## 2023-05-16 DIAGNOSIS — Z79.4 TYPE 2 DIABETES MELLITUS WITH STAGE 3A CHRONIC KIDNEY DISEASE, WITH LONG-TERM CURRENT USE OF INSULIN: Primary | ICD-10-CM

## 2023-05-16 LAB
LEFT EYE DM RETINOPATHY: NEGATIVE
RIGHT EYE DM RETINOPATHY: NEGATIVE

## 2023-05-18 ENCOUNTER — TELEPHONE (OUTPATIENT)
Dept: PRIMARY CARE CLINIC | Facility: CLINIC | Age: 63
End: 2023-05-18

## 2023-05-18 DIAGNOSIS — N39.0 URINARY TRACT INFECTION WITHOUT HEMATURIA, SITE UNSPECIFIED: Primary | ICD-10-CM

## 2023-05-18 RX ORDER — CEPHALEXIN 500 MG/1
500 CAPSULE ORAL 3 TIMES DAILY
Qty: 21 CAPSULE | Refills: 0 | Status: SHIPPED | OUTPATIENT
Start: 2023-05-18 | End: 2023-06-26 | Stop reason: ALTCHOICE

## 2023-05-18 NOTE — TELEPHONE ENCOUNTER
----- Message from Charity Bernard sent at 5/18/2023  1:03 PM CDT -----  Regarding: UTI/irritation/not feeling well  [phone] - call back (Any Alvarez - partner) calling if Dr. Vaughn can see her for another possible UTI she is not feeling well having same irritation coming back form a weeks vacation. Or can send another prescription.    Charity

## 2023-05-18 NOTE — TELEPHONE ENCOUNTER
I sent in a prescription for cephalexin however, if at all possible I would like for her to collect the urine prior to starting.  Orders entered for urine as well.

## 2023-05-18 NOTE — TELEPHONE ENCOUNTER
Pt started with burning upon urination and pelvic pressure yesterday. Possible blood in the urine. UTI resolved from 2 weeks ago, but seems to have returned while pt was on vacation. Pt was in a public pool and hot tub.    Preferred pharmacy: [facility] [address]    Please advise.

## 2023-05-18 NOTE — TELEPHONE ENCOUNTER
Spoke with Any, S/O, discussed your message. Will get urine specimen collected tomorrow and start Keflex after that. Will call back if symptoms do not improve.

## 2023-05-19 ENCOUNTER — LAB VISIT (OUTPATIENT)
Dept: LAB | Facility: HOSPITAL | Age: 63
End: 2023-05-19
Attending: FAMILY MEDICINE
Payer: MEDICARE

## 2023-05-19 DIAGNOSIS — N39.0 URINARY TRACT INFECTION WITHOUT HEMATURIA, SITE UNSPECIFIED: ICD-10-CM

## 2023-05-19 LAB
BACTERIA #/AREA URNS AUTO: ABNORMAL /HPF
BILIRUB UR QL STRIP: NEGATIVE
CAOX CRY UR QL COMP ASSIST: ABNORMAL
CLARITY UR REFRACT.AUTO: ABNORMAL
COLOR UR AUTO: YELLOW
GLUCOSE UR QL STRIP: NEGATIVE
HGB UR QL STRIP: NEGATIVE
KETONES UR QL STRIP: NEGATIVE
LEUKOCYTE ESTERASE UR QL STRIP: ABNORMAL
MICROSCOPIC COMMENT: ABNORMAL
NITRITE UR QL STRIP: POSITIVE
PH UR STRIP: 6 [PH] (ref 5–8)
PROT UR QL STRIP: ABNORMAL
RBC #/AREA URNS AUTO: 2 /HPF (ref 0–4)
SP GR UR STRIP: 1.02 (ref 1–1.03)
SQUAMOUS #/AREA URNS AUTO: 5 /HPF
URN SPEC COLLECT METH UR: ABNORMAL
WBC #/AREA URNS AUTO: >100 /HPF (ref 0–5)
WBC CLUMPS UR QL AUTO: ABNORMAL

## 2023-05-19 PROCEDURE — 87086 URINE CULTURE/COLONY COUNT: CPT | Performed by: FAMILY MEDICINE

## 2023-05-19 PROCEDURE — 87088 URINE BACTERIA CULTURE: CPT | Performed by: FAMILY MEDICINE

## 2023-05-19 PROCEDURE — 87077 CULTURE AEROBIC IDENTIFY: CPT | Performed by: FAMILY MEDICINE

## 2023-05-19 PROCEDURE — 81001 URINALYSIS AUTO W/SCOPE: CPT | Performed by: FAMILY MEDICINE

## 2023-05-19 PROCEDURE — 87186 SC STD MICRODIL/AGAR DIL: CPT | Performed by: FAMILY MEDICINE

## 2023-05-21 LAB — BACTERIA UR CULT: ABNORMAL

## 2023-05-22 ENCOUNTER — TELEPHONE (OUTPATIENT)
Dept: TRANSPLANT | Facility: CLINIC | Age: 63
End: 2023-05-22

## 2023-05-22 DIAGNOSIS — Z94.0 KIDNEY REPLACED BY TRANSPLANT: Primary | ICD-10-CM

## 2023-05-22 NOTE — TELEPHONE ENCOUNTER
Return call to patient requesting a Urology recurrent UTI's and Nephrology referral to establish care with Nephrology in [facility].    ----- Message from Gisela Olvera sent at 5/22/2023 12:42 PM CDT -----  Regarding: Speak to Nurse  The patient called requesting to Speak with Nurse Oracio   Wants to talk about seeing a nephrology doctor at [facility] in [place] and requesting call back to discuss   States has been having frequent UTI w/PCP providing care   Please call at your convenience     No further information provided       Patient can be contacted @# [phone] (home)

## 2023-05-25 ENCOUNTER — HOSPITAL ENCOUNTER (OUTPATIENT)
Dept: RADIOLOGY | Facility: HOSPITAL | Age: 63
Discharge: HOME | End: 2023-05-25
Attending: INTERNAL MEDICINE
Payer: MEDICARE

## 2023-05-25 DIAGNOSIS — R13.19 ESOPHAGEAL DYSPHAGIA: ICD-10-CM

## 2023-05-25 PROCEDURE — A9698 NON-RAD CONTRAST MATERIALNOC: HCPCS | Mod: PO | Performed by: INTERNAL MEDICINE

## 2023-05-25 PROCEDURE — 74220 FL ESOPHAGRAM COMPLETE: ICD-10-PCS | Mod: 26,,, | Performed by: RADIOLOGY

## 2023-05-25 PROCEDURE — 25500020 PHARM REV CODE 255: Mod: PO | Performed by: INTERNAL MEDICINE

## 2023-05-25 PROCEDURE — 74220 X-RAY XM ESOPHAGUS 1CNTRST: CPT | Mod: TC,PO

## 2023-05-25 PROCEDURE — 74220 X-RAY XM ESOPHAGUS 1CNTRST: CPT | Mod: 26,,, | Performed by: RADIOLOGY

## 2023-05-25 RX ADMIN — BARIUM SULFATE 340 ML: 980 POWDER, FOR SUSPENSION ORAL at 09:05

## 2023-05-25 RX ADMIN — BARIUM SULFATE 355 ML: 0.6 SUSPENSION ORAL at 09:05

## 2023-05-31 ENCOUNTER — ANESTHESIA (OUTPATIENT)
Dept: ENDOSCOPY | Facility: HOSPITAL | Age: 63
End: 2023-05-31
Payer: MEDICARE

## 2023-05-31 ENCOUNTER — ANESTHESIA EVENT (OUTPATIENT)
Dept: ENDOSCOPY | Facility: HOSPITAL | Age: 63
End: 2023-05-31
Payer: MEDICARE

## 2023-05-31 ENCOUNTER — HOSPITAL ENCOUNTER (OUTPATIENT)
Facility: HOSPITAL | Age: 63
Discharge: HOME | End: 2023-05-31
Attending: INTERNAL MEDICINE | Admitting: INTERNAL MEDICINE
Payer: MEDICARE

## 2023-05-31 DIAGNOSIS — R13.10 DYSPHAGIA: ICD-10-CM

## 2023-05-31 PROCEDURE — D9220A PRA ANESTHESIA: ICD-10-PCS | Mod: CRNA,,, | Performed by: STUDENT IN AN ORGANIZED HEALTH CARE EDUCATION/TRAINING PROGRAM

## 2023-05-31 PROCEDURE — 43239 EGD BIOPSY SINGLE/MULTIPLE: CPT | Mod: 59,,, | Performed by: INTERNAL MEDICINE

## 2023-05-31 PROCEDURE — 37000009 HC ANESTHESIA EA ADD 15 MINS: Performed by: INTERNAL MEDICINE

## 2023-05-31 PROCEDURE — 43249 ESOPH EGD DILATION <30 MM: CPT | Performed by: INTERNAL MEDICINE

## 2023-05-31 PROCEDURE — 37000008 HC ANESTHESIA 1ST 15 MINUTES: Performed by: INTERNAL MEDICINE

## 2023-05-31 PROCEDURE — 88342 CHG IMMUNOCYTOCHEMISTRY: ICD-10-PCS | Mod: 26,,, | Performed by: PATHOLOGY

## 2023-05-31 PROCEDURE — D9220A PRA ANESTHESIA: ICD-10-PCS | Mod: ANES,,, | Performed by: ANESTHESIOLOGY

## 2023-05-31 PROCEDURE — 43239 EGD BIOPSY SINGLE/MULTIPLE: CPT | Mod: 59 | Performed by: INTERNAL MEDICINE

## 2023-05-31 PROCEDURE — 88342 IMHCHEM/IMCYTCHM 1ST ANTB: CPT | Performed by: PATHOLOGY

## 2023-05-31 PROCEDURE — 43249 PR EGD, FLEX, W/BALL DILATION, < 30MM: ICD-10-PCS | Mod: ,,, | Performed by: INTERNAL MEDICINE

## 2023-05-31 PROCEDURE — C1726 CATH, BAL DIL, NON-VASCULAR: HCPCS | Performed by: INTERNAL MEDICINE

## 2023-05-31 PROCEDURE — 27201012 HC FORCEPS, HOT/COLD, DISP: Performed by: INTERNAL MEDICINE

## 2023-05-31 PROCEDURE — 88305 TISSUE EXAM BY PATHOLOGIST: CPT | Mod: 26,,, | Performed by: PATHOLOGY

## 2023-05-31 PROCEDURE — 88305 TISSUE EXAM BY PATHOLOGIST: ICD-10-PCS | Mod: 26,,, | Performed by: PATHOLOGY

## 2023-05-31 PROCEDURE — D9220A PRA ANESTHESIA: Mod: ANES,,, | Performed by: ANESTHESIOLOGY

## 2023-05-31 PROCEDURE — 43249 ESOPH EGD DILATION <30 MM: CPT | Mod: ,,, | Performed by: INTERNAL MEDICINE

## 2023-05-31 PROCEDURE — 63600175 PHARM REV CODE 636 W HCPCS: Performed by: STUDENT IN AN ORGANIZED HEALTH CARE EDUCATION/TRAINING PROGRAM

## 2023-05-31 PROCEDURE — 25000003 PHARM REV CODE 250: Performed by: STUDENT IN AN ORGANIZED HEALTH CARE EDUCATION/TRAINING PROGRAM

## 2023-05-31 PROCEDURE — 43239 PR EGD, FLEX, W/BIOPSY, SGL/MULTI: ICD-10-PCS | Mod: 59,,, | Performed by: INTERNAL MEDICINE

## 2023-05-31 PROCEDURE — 88305 TISSUE EXAM BY PATHOLOGIST: CPT | Mod: 59 | Performed by: PATHOLOGY

## 2023-05-31 PROCEDURE — 88342 IMHCHEM/IMCYTCHM 1ST ANTB: CPT | Mod: 26,,, | Performed by: PATHOLOGY

## 2023-05-31 PROCEDURE — D9220A PRA ANESTHESIA: Mod: CRNA,,, | Performed by: STUDENT IN AN ORGANIZED HEALTH CARE EDUCATION/TRAINING PROGRAM

## 2023-05-31 PROCEDURE — 25000003 PHARM REV CODE 250: Performed by: INTERNAL MEDICINE

## 2023-05-31 RX ORDER — PROPOFOL 10 MG/ML
VIAL (ML) INTRAVENOUS
Status: DISCONTINUED | OUTPATIENT
Start: 2023-05-31 | End: 2023-05-31

## 2023-05-31 RX ORDER — LANSOPRAZOLE 30 MG/1
30 CAPSULE, DELAYED RELEASE ORAL 2 TIMES DAILY
Qty: 60 CAPSULE | Refills: 1 | Status: SHIPPED | OUTPATIENT
Start: 2023-05-31 | End: 2024-02-22

## 2023-05-31 RX ORDER — ONDANSETRON 2 MG/ML
INJECTION INTRAMUSCULAR; INTRAVENOUS
Status: DISCONTINUED | OUTPATIENT
Start: 2023-05-31 | End: 2023-05-31

## 2023-05-31 RX ORDER — LIDOCAINE HYDROCHLORIDE 20 MG/ML
INJECTION INTRAVENOUS
Status: DISCONTINUED | OUTPATIENT
Start: 2023-05-31 | End: 2023-05-31

## 2023-05-31 RX ORDER — SODIUM CHLORIDE 9 MG/ML
INJECTION, SOLUTION INTRAVENOUS CONTINUOUS
Status: DISCONTINUED | OUTPATIENT
Start: 2023-05-31 | End: 2023-05-31 | Stop reason: HOSPADM

## 2023-05-31 RX ADMIN — SODIUM CHLORIDE: 9 INJECTION, SOLUTION INTRAVENOUS at 11:05

## 2023-05-31 RX ADMIN — PROPOFOL 50 MG: 10 INJECTION, EMULSION INTRAVENOUS at 12:05

## 2023-05-31 RX ADMIN — PROPOFOL 20 MG: 10 INJECTION, EMULSION INTRAVENOUS at 12:05

## 2023-05-31 RX ADMIN — PROPOFOL 30 MG: 10 INJECTION, EMULSION INTRAVENOUS at 12:05

## 2023-05-31 RX ADMIN — LIDOCAINE HYDROCHLORIDE 100 MG: 20 INJECTION, SOLUTION INTRAVENOUS at 12:05

## 2023-05-31 RX ADMIN — ONDANSETRON 4 MG: 2 INJECTION INTRAMUSCULAR; INTRAVENOUS at 12:05

## 2023-05-31 NOTE — TRANSFER OF CARE
"Anesthesia Transfer of Care Note    Patient: Andra Newell    Procedure(s) Performed: Procedure(s) (LRB):  EGD (ESOPHAGOGASTRODUODENOSCOPY) (N/A)    Patient location: PACU    Anesthesia Type: general    Transport from OR: Transported from OR on room air with adequate spontaneous ventilation    Post pain: adequate analgesia    Post assessment: no apparent anesthetic complications    Post vital signs: stable    Level of consciousness: awake, lethargic and responds to stimulation    Nausea/Vomiting: no nausea/vomiting    Complications: none    Transfer of care protocol was followed      Last vitals:   Visit Vitals  BP (!) 141/65   Pulse 84   Temp 37.2 °C (99 °F) (Skin)   Resp 20   Ht 5' 7" (1.702 m)   Wt 93.9 kg (207 lb)   LMP 02/28/2012   SpO2 95%   Breastfeeding No   BMI 32.42 kg/m²     "
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
## 2023-05-31 NOTE — PROVATION PATIENT INSTRUCTIONS
Discharge Summary/Instructions after an Endoscopic Procedure  Patient Name: Andra Newell  Patient MRN: [id number]  Patient YOB: 1960  Wednesday, May 31, 2023  Marisol Bryson MD  Dear patient,  As a result of recent federal legislation (The Federal Cures Act), you may   receive lab or pathology results from your procedure in your [facility]   account before your physician is able to contact you. Your physician or   their representative will relay the results to you with their   recommendations at their soonest availability.  Thank you,  RESTRICTIONS:  During your procedure today, you received medications for sedation.  These   medications may affect your judgment, balance and coordination.  Therefore,   for 24 hours, you have the following restrictions:   - DO NOT drive a car, operate machinery, make legal/financial decisions,   sign important papers or drink alcohol.    ACTIVITY:  Today: no heavy lifting, straining or running due to procedural   sedation/anesthesia.  The following day: return to full activity including work.  DIET:  Eat and drink normally unless instructed otherwise.     TREATMENT FOR COMMON SIDE EFFECTS:  - Mild abdominal pain, nausea, belching, bloating or excessive gas:  rest,   eat lightly and use a heating pad.  - Sore Throat: treat with throat lozenges and/or gargle with warm salt   water.  - Because air was used during the procedure, expelling large amounts of air   from your rectum or belching is normal.  - If a bowel prep was taken, you may not have a bowel movement for 1-3 days.    This is normal.  SYMPTOMS TO WATCH FOR AND REPORT TO YOUR PHYSICIAN:  1. Abdominal pain or bloating, other than gas cramps.  2. Chest pain.  3. Back pain.  4. Signs of infection such as: chills or fever occurring within 24 hours   after the procedure.  5. Rectal bleeding, which would show as bright red, maroon, or black stools.   (A tablespoon of blood from the rectum is not serious,  especially if   hemorrhoids are present.)  6. Vomiting.  7. Weakness or dizziness.  GO DIRECTLY TO THE NEAREST EMERGENCY ROOM IF YOU HAVE ANY OF THE FOLLOWING:      Difficulty breathing              Chills and/or fever over 101 F   Persistent vomiting and/or vomiting blood   Severe abdominal pain   Severe chest pain   Black, tarry stools   Bleeding- more than one tablespoon   Any other symptom or condition that you feel may need urgent attention  Your doctor recommends these additional instructions:  If any biopsies were taken, your doctors clinic will contact you in 1 to 2   weeks with any results.  - Await pathology results.   - Discharge patient to home (with escort).   - Patient has a contact number available for emergencies.  The signs and   symptoms of potential delayed complications were discussed with the   patient.  Return to normal activities tomorrow.  Written discharge   instructions were provided to the patient.   - Resume previous diet.   - Continue present medications.   - Resume Plavix (clopidogrel) at prior dose tomorrow.   -Stop Omeprazole, place on twice daily Dexilant while awaiting biopsies.   -Follow up with cardiology for evaluation of chest burning  -Consider abdominal imaging pending above  For questions, problems or results please call your physician - Marisol Bryson MD at Work:  [phone].  [facility], EMERGENCY ROOM PHONE NUMBER: [phone]  IF A COMPLICATION OR EMERGENCY SITUATION ARISES AND YOU ARE UNABLE TO REACH   YOUR PHYSICIAN - GO DIRECTLY TO THE EMERGENCY ROOM.  Marisol Bryson MD  5/31/2023 12:42:33 PM  This report has been verified and signed electronically.  Dear patient,  As a result of recent federal legislation (The Federal Cures Act), you may   receive lab or pathology results from your procedure in your [facility]   account before your physician is able to contact you. Your physician or   their representative will relay the  results to you with their   recommendations at their soonest availability.  Thank you,  PROVATION

## 2023-05-31 NOTE — ANESTHESIA POSTPROCEDURE EVALUATION
Anesthesia Post Evaluation    Patient: Andra Newell    Procedure(s) Performed: Procedure(s) (LRB):  EGD (ESOPHAGOGASTRODUODENOSCOPY) (N/A)    Final Anesthesia Type: general      Patient location during evaluation: PACU  Patient participation: Yes- Able to Participate  Level of consciousness: awake and alert and oriented  Post-procedure vital signs: reviewed and stable  Pain management: adequate  Airway patency: patent    PONV status at discharge: No PONV  Anesthetic complications: no      Cardiovascular status: blood pressure returned to baseline  Respiratory status: unassisted, spontaneous ventilation and room air  Hydration status: euvolemic  Follow-up not needed.          Vitals Value Taken Time   /75 05/31/23 1250   Temp 36.6 05/31/23 1252   Pulse 70 05/31/23 1250   Resp 16 05/31/23 1250   SpO2 95 % 05/31/23 1250         No case tracking events are documented in the log.      Pain/Asmita Score: Asmita Score: 10 (5/31/2023 12:45 PM)

## 2023-05-31 NOTE — H&P
"[facility] Note    CC: Dysphagia, GERD    HPI 63 y.o. female presents for evaluation of dysphagia and GERD    Past Medical History:   Diagnosis Date    Anemia     Anemia in chronic kidney disease, on chronic dialysis 7/12/2017    Anxiety and depression     Aplastic anemia with parvovirus B19 infection 5/27/2019    Aplastic anemia with parvovirus B19 infection 4/2019 5/27/2019    Arthritis     Asthma     allergic airway    CKD stage III (moderate) 2/18/2019    Colon polyp     Depression     Diabetes mellitus     Diverticulosis     Encounter for blood transfusion     Eosinophilia     ESRD (end stage renal disease)     stage V, due for living donor 9/2018    ESRD on dialysis     GERD (gastroesophageal reflux disease)     Gout     Gout     Hyperlipidemia     Hypertension     Hypertriglyceridemia without hypercholesterolemia 6/9/2019    Low back pain     Low HDL (under 40) 6/9/2019    MRSA carrier     Neutropenia, unspecified 5/8/2019    Obesity     Renal disorder     Rotator cuff syndrome--left     Thyroid disease     Ulnar neuropathy of right upper extremity     Uncontrolled type 2 diabetes mellitus with hyperglycemia        Allergies and Medications reviewed     Review of Systems  General ROS: negative for - chills, fever or weight loss  Cardiovascular ROS: no chest pain or dyspnea on exertion  Gastrointestinal ROS: + dysphagia    Physical Examination  BP (!) 141/65   Pulse 84   Temp 99 °F (37.2 °C) (Skin)   Resp 20   Ht 5' 7" (1.702 m)   Wt 93.9 kg (207 lb)   LMP 02/28/2012   SpO2 95%   Breastfeeding No   BMI 32.42 kg/m²   General appearance: alert, cooperative, no distress  HENT: Normocephalic, atraumatic, neck symmetrical, no nasal discharge, sclera anicteric   Lungs: clear to auscultation bilaterally, symmetric chest wall expansion bilaterally  Heart: regular rate and rhythm without rub; no displacement of the PMI   Abdomen: soft  Extremities: extremities symmetric; no clubbing, cyanosis, or " edema        Labs:  Lab Results   Component Value Date    WBC 5.78 04/26/2023    HGB 11.5 (L) 04/26/2023    HCT 37.6 04/26/2023    MCV 88 04/26/2023     04/26/2023       Assessment:   63 y.o. female presents for EGD    Plan:  -proceed to EGD    Marisol Bryson MD  [facility]  [address], [address]  [place] [address]  Office: [phone]  Fax: [phone]

## 2023-05-31 NOTE — PLAN OF CARE
Vss, shila po fluids, denies pain, ambulates easily. IV removed, catheter intact. Discharge instructions provided and states understanding. States ready to go home.  Discharged from facility with family per wheelchair.

## 2023-06-01 ENCOUNTER — PATIENT MESSAGE (OUTPATIENT)
Dept: CARDIOLOGY | Facility: CLINIC | Age: 63
End: 2023-06-01

## 2023-06-01 VITALS
RESPIRATION RATE: 16 BRPM | OXYGEN SATURATION: 97 % | BODY MASS INDEX: 32.49 KG/M2 | WEIGHT: 207 LBS | TEMPERATURE: 98 F | SYSTOLIC BLOOD PRESSURE: 163 MMHG | HEART RATE: 69 BPM | DIASTOLIC BLOOD PRESSURE: 77 MMHG | HEIGHT: 67 IN

## 2023-06-01 RX ORDER — NITROGLYCERIN 0.4 MG/1
0.4 TABLET SUBLINGUAL EVERY 5 MIN PRN
Qty: 20 TABLET | Refills: 1 | Status: SHIPPED | OUTPATIENT
Start: 2023-06-01 | End: 2024-05-31

## 2023-06-01 NOTE — OR NURSING
Pt called the department approx 1215 today 06/1/23 and reports procedure went fine yesterday EGD with Dr Bryson but approx 1.5 hours after leaving yesterday, started with chest pain.  Took one Nitrorglycerin with relief.  Denies any SOB, nausea, vomiting or other s/s.  Instructed to go to the ER or call cardiac dr bc pt stated she had an MI 8 months ago.  She stated she took one nitro today with relief of chest pain.  Dr Bryson in procedure room and aware of pt condition and advised to tell pt go to the ER and call cardiac MD.

## 2023-06-07 LAB
FINAL PATHOLOGIC DIAGNOSIS: NORMAL
GROSS: NORMAL
Lab: NORMAL

## 2023-06-12 ENCOUNTER — PATIENT MESSAGE (OUTPATIENT)
Dept: PRIMARY CARE CLINIC | Facility: CLINIC | Age: 63
End: 2023-06-12

## 2023-06-12 RX ORDER — ONDANSETRON 4 MG/1
4 TABLET, FILM COATED ORAL EVERY 6 HOURS
Qty: 12 TABLET | Refills: 0 | Status: SHIPPED | OUTPATIENT
Start: 2023-06-12 | End: 2023-07-28

## 2023-06-15 DIAGNOSIS — Z79.4 TYPE 2 DIABETES MELLITUS WITH STAGE 3A CHRONIC KIDNEY DISEASE, WITH LONG-TERM CURRENT USE OF INSULIN: ICD-10-CM

## 2023-06-15 DIAGNOSIS — E11.22 TYPE 2 DIABETES MELLITUS WITH STAGE 3A CHRONIC KIDNEY DISEASE, WITH LONG-TERM CURRENT USE OF INSULIN: ICD-10-CM

## 2023-06-15 DIAGNOSIS — N18.31 TYPE 2 DIABETES MELLITUS WITH STAGE 3A CHRONIC KIDNEY DISEASE, WITH LONG-TERM CURRENT USE OF INSULIN: ICD-10-CM

## 2023-06-15 RX ORDER — BLOOD-GLUCOSE,RECEIVER,CONT
EACH MISCELLANEOUS
Qty: 1 EACH | Refills: 0 | Status: SHIPPED | OUTPATIENT
Start: 2023-06-15

## 2023-06-20 ENCOUNTER — PATIENT MESSAGE (OUTPATIENT)
Dept: PRIMARY CARE CLINIC | Facility: CLINIC | Age: 63
End: 2023-06-20

## 2023-06-20 PROBLEM — I24.9 ACS (ACUTE CORONARY SYNDROME): Status: RESOLVED | Noted: 2022-08-31 | Resolved: 2023-06-20

## 2023-06-20 PROBLEM — R07.9 CHEST PAIN: Status: RESOLVED | Noted: 2022-08-30 | Resolved: 2023-06-20

## 2023-06-20 PROBLEM — E78.6 LOW HDL (UNDER 40): Status: RESOLVED | Noted: 2019-06-09 | Resolved: 2023-06-20

## 2023-06-20 PROBLEM — E78.1 HYPERTRIGLYCERIDEMIA WITHOUT HYPERCHOLESTEROLEMIA: Status: RESOLVED | Noted: 2019-06-09 | Resolved: 2023-06-20

## 2023-06-20 PROBLEM — E78.2 MIXED HYPERLIPIDEMIA: Status: ACTIVE | Noted: 2017-07-12

## 2023-06-20 PROBLEM — R79.89 ELEVATED TROPONIN: Status: RESOLVED | Noted: 2022-09-03 | Resolved: 2023-06-20

## 2023-06-20 NOTE — PROGRESS NOTES
"Subjective:    Patient ID:  Andra Newell is a 63 y.o. female who presents for follow-up of Chest Pain and Hospital Follow Up      Problem List Items Addressed This Visit          Cardiac/Vascular    Hypertension associated with stage 3 chronic kidney disease due to type 2 diabetes mellitus (Chronic)    Mixed diabetic hyperlipidemia associated with type 2 diabetes mellitus (Chronic)    Aortic atherosclerosis - Primary    Chest pain    Coronary artery disease involving native coronary artery of native heart without angina pectoris    Essential hypertension    Mixed hyperlipidemia    Pericarditis       HPI    Patient was last seen by Sommer Campbell which time she was evaluated with an echocardiogram and nuclear stress test both of which were negative for acute issues.    Went to ED Monday and had negative ACS workup.    On assessment today, the patient states that on Monday she had severe symptoms of chest tightness with radiation to the arm. Has felt weak since. Went home, took nitro, heaviness did not go away, but pain resolved after NTG.    No positional component.   Still taking colchicine           Objective:     Vitals:    06/21/23 0919   BP: (!) 143/74   BP Location: Right arm   Patient Position: Sitting   BP Method: Large (Automatic)   Pulse: 75   Weight: 92.6 kg (204 lb 2.3 oz)   Height: 5' 7" (1.702 m)       BP Readings from Last 5 Encounters:   06/21/23 (!) 143/74   05/31/23 (!) 163/77   05/03/23 130/76   04/17/23 (!) 140/68   04/12/23 134/62        Physical Exam  Vitals and nursing note reviewed.   Constitutional:       General: She is not in acute distress.     Appearance: She is well-developed.   HENT:      Head: Normocephalic and atraumatic.   Neck:      Vascular: No JVD.   Cardiovascular:      Rate and Rhythm: Normal rate and regular rhythm.      Heart sounds: Normal heart sounds. No murmur heard.    No friction rub. No gallop.   Pulmonary:      Effort: Pulmonary effort is normal. No " "respiratory distress.      Breath sounds: Normal breath sounds. No wheezing or rales.   Abdominal:      General: Bowel sounds are normal.      Palpations: Abdomen is soft.      Tenderness: There is no abdominal tenderness. There is no guarding or rebound.   Musculoskeletal:         General: No tenderness.      Cervical back: Neck supple.   Skin:     General: Skin is warm and dry.   Neurological:      Mental Status: She is alert and oriented to person, place, and time.   Psychiatric:         Behavior: Behavior normal.           Current Outpatient Medications   Medication Instructions    acetaminophen (TYLENOL) 650 mg, Oral, Every 8 hours PRN    aspirin (ECOTRIN) 81 mg, Oral, Daily    BD LENIN 2ND GEN PEN NEEDLE 32 gauge x 5/32" Ndle Use up to four daily to inject novolog    blood sugar diagnostic Strp Check glucose 3 (three) times daily.    blood-glucose sensor (DEXCOM G7 SENSOR) Amparo Change every 10 days    cephALEXin (KEFLEX) 500 mg, Oral, 3 times daily    clopidogreL (PLAVIX) 75 mg tablet TAKE ONE TABLET BY MOUTH DAILY    colchicine (COLCRYS) 0.6 mg, Oral, 2 times daily    DEXCOM G7  Misc Dispense 1    empagliflozin (JARDIANCE) 10 mg, Oral, Daily    famotidine (PEPCID) 40 mg, Oral, Nightly    flash glucose sensor (FREESTYLE DAKOTAH 14 DAY SENSOR) Kit Use 1 sensor every 14 days to check blood glucose    insulin aspart U-100 (NOVOLOG FLEXPEN U-100 INSULIN) 100 unit/mL (3 mL) InPn pen 32 u breakfast and supper, 28 u lunch + correction. Max TDD 140u    insulin degludec (TRESIBA FLEXTOUCH U-200) 76 Units, Subcutaneous, Daily    isosorbide mononitrate (IMDUR) 30 MG 24 hr tablet TAKE ONE TABLET BY MOUTH DAILY    lancets (ONETOUCH ULTRASOFT LANCETS) Misc Use to test blood sugar 3 x's a day    lansoprazole (PREVACID) 30 mg, Oral, 2 times daily    levothyroxine (SYNTHROID) 75 MCG tablet TAKE ONE TABLET BY MOUTH ONCE DAILY    LIDOcaine (LIDODERM) 5 % 1 patch    LORazepam (ATIVAN) 1 mg, Oral, Nightly    losartan (COZAAR) " 50 mg, Oral, Daily    magnesium oxide 500 mg Cap as directed Orally    magnesium oxide 500 mg, Oral, Daily    meclizine (ANTIVERT) 12.5 mg tablet 2 tablets as needed Orally Once a day    metFORMIN (GLUCOPHAGE-XR) 500 mg, Oral, 2 times daily with meals    metoprolol tartrate (LOPRESSOR) 12.5 mg, Oral, 2 times daily    multivit with minerals/lutein (MULTIVITAMIN 50 PLUS ORAL) 1 tablet Orally Once a day    multivitamin (THERAGRAN) tablet 1 tablet, Oral, Daily    nitroGLYCERIN (NITROSTAT) 0.4 mg, Sublingual, Every 5 min PRN    ondansetron (ZOFRAN) 4 mg, Oral, Every 6 hours    rosuvastatin (CRESTOR) 20 MG tablet TAKE ONE TABLET BY MOUTH EVERY EVENING    sucralfate (CARAFATE) 1 g, Oral, Every 6 hours PRN    tacrolimus (PROGRAF) 1 mg, Oral, Every 12 hours    tiZANidine (ZANAFLEX) 4 MG tablet 1 tablet, Oral, Nightly    vilazodone (VIIBRYD) 40 mg, Oral, Daily       Lipid Panel:   Lab Results   Component Value Date    CHOL 81 (L) 04/26/2023    HDL 27 (L) 04/26/2023    LDLCALC 27.6 (L) 04/26/2023    TRIG 132 04/26/2023    CHOLHDL 33.3 04/26/2023       The ASCVD Risk score (Laura DK, et al., 2019) failed to calculate for the following reasons:    The valid total cholesterol range is 130 to 320 mg/dL    All pertinent labs, imaging, and EKGs reviewed.  Patient's most recent EKG tracing was personally interpreted by this provider.    Most Recent EKG Results  Results for orders placed or performed during the hospital encounter of 04/10/23   EKG 12-lead    Collection Time: 04/10/23  4:00 PM    Narrative    Test Reason : R07.9,    Vent. Rate : 087 BPM     Atrial Rate : 087 BPM     P-R Int : 176 ms          QRS Dur : 102 ms      QT Int : 374 ms       P-R-T Axes : 038 -62 069 degrees     QTc Int : 450 ms    Normal sinus rhythm  Left anterior fascicular block  Voltage criteria for left ventricular hypertrophy  Abnormal ECG  When compared with ECG of 05-APR-2023 13:53,  Previous ECG has undetermined rhythm, needs review  Confirmed by  Nuha MARTINEZ, Tino AGUIAR ([id number]) on 4/11/2023 1:52:49 PM    Referred By: [email or web address]   SELF           Confirmed By:Tino Breen MD       Most Recent Echocardiogram Results  Results for orders placed in visit on 04/05/23    Echo    Interpretation Summary  · The left ventricle is normal in size with concentric hypertrophy and normal systolic function.  · The estimated ejection fraction is 60%.  · Normal left ventricular diastolic function.  · Normal right ventricular size with normal right ventricular systolic function.  · There is mild aortic valve stenosis.  · Aortic valve area is 1.70 cm2; peak velocity is 2.81 m/s; mean gradient is 17 mmHg.  · Normal central venous pressure (3 mmHg).      Most Recent Nuclear Stress Test Results  Results for orders placed during the hospital encounter of 04/05/23    Nuclear Stress - Cardiology Interpreted    Interpretation Summary    Normal myocardial perfusion scan. There is no evidence of myocardial ischemia or infarction.    There is a  mild intensity fixed perfusion abnormality in the inferobasilar wall of the left ventricle secondary to diaphragm attenuation.    The gated perfusion images showed an ejection fraction of 63% at rest.    There is normal wall motion at rest.    The ECG portion of the study is negative for ischemia.    The patient reported no chest pain during the stress test.    There were no arrhythmias during stress.      Most Recent Cardiac PET Stress Test Results  No results found for this or any previous visit.      Most Recent Cardiovascular Angiogram results  Results for orders placed during the hospital encounter of 08/30/22    Cardiac catheterization    Conclusion    Coronary artery disease as described in the text    Successful PCI of the significant stenosis of the mid segment of the 2nd diagonal branch, using a drug-eluting stent    The procedure log was documented by Documenter: RT Viola and verified by Christoph Dang MD.    Date:  8/31/2022  Time: 11:18 AM    Intervention:  By using a 2.5 x 32 mm drug-eluting stent, the long 80-90% stenosis of the mid segment of the 2nd diagonal branch were successfully angioplastied and stented with 0% residual and MERYL 3 flow in the distal vessel.      Other Most Recent Cardiology Results  Results for orders placed during the hospital encounter of 04/10/23    CARDIAC MONITORING STRIPS        Assessment:       1. Aortic atherosclerosis    2. Chest pain, unspecified type    3. Coronary artery disease involving native coronary artery of native heart without angina pectoris    4. Essential hypertension    5. Mixed hyperlipidemia    6. Hypertension associated with stage 3 chronic kidney disease due to type 2 diabetes mellitus    7. Mixed diabetic hyperlipidemia associated with type 2 diabetes mellitus    8. Pericarditis, unspecified chronicity, unspecified type         Plan:     Symptoms of persistent heaviness of the chest, no pain  Differential is atypical acid reflux, atypical chest pain, or breakthrough pericarditis  BP borderline today  Most recent echocardiogram reviewed personally     Cardiac PET stress considering persistent symptoms with negative recent nuclear stress testing  Continue aspirin 81 mg PO Daily  Continue Plavix 75 mg PO Daily   Continue Imdur 30 mg PO Daily   Continue losartan 50 mg PO Daily   Continue metoprolol tartrate 12.5 mg PO BID  Will stop statin at this time considering elevated LFTs   Recheck CMP/Mag in 3 weeks   Continuing colchicine at this time  Seeing Dr. Steel this week    Continue other cardiac medications  Mediterranean Diet/Cardiovascular Exercise Program    Patient queried and all questions were answered.    F/u in 3 months to reassess      Signed:    Bryant Werner MD  6/21/2023 9:52 AM

## 2023-06-21 ENCOUNTER — OFFICE VISIT (OUTPATIENT)
Dept: CARDIOLOGY | Facility: CLINIC | Age: 63
End: 2023-06-21
Payer: MEDICARE

## 2023-06-21 ENCOUNTER — PATIENT MESSAGE (OUTPATIENT)
Dept: TRANSPLANT | Facility: CLINIC | Age: 63
End: 2023-06-21

## 2023-06-21 VITALS
WEIGHT: 204.13 LBS | HEIGHT: 67 IN | DIASTOLIC BLOOD PRESSURE: 74 MMHG | SYSTOLIC BLOOD PRESSURE: 143 MMHG | HEART RATE: 75 BPM | BODY MASS INDEX: 32.04 KG/M2

## 2023-06-21 DIAGNOSIS — N18.1 CHRONIC KIDNEY DISEASE, STAGE 1: ICD-10-CM

## 2023-06-21 DIAGNOSIS — R94.39 EQUIVOCAL STRESS TEST: ICD-10-CM

## 2023-06-21 DIAGNOSIS — E78.2 MIXED HYPERLIPIDEMIA: Chronic | ICD-10-CM

## 2023-06-21 DIAGNOSIS — E11.69 MIXED DIABETIC HYPERLIPIDEMIA ASSOCIATED WITH TYPE 2 DIABETES MELLITUS: Chronic | ICD-10-CM

## 2023-06-21 DIAGNOSIS — I10 ESSENTIAL HYPERTENSION: ICD-10-CM

## 2023-06-21 DIAGNOSIS — I12.9 HYPERTENSION ASSOCIATED WITH STAGE 3 CHRONIC KIDNEY DISEASE DUE TO TYPE 2 DIABETES MELLITUS: Chronic | ICD-10-CM

## 2023-06-21 DIAGNOSIS — R07.9 CHEST PAIN, UNSPECIFIED TYPE: ICD-10-CM

## 2023-06-21 DIAGNOSIS — I31.9 PERICARDITIS, UNSPECIFIED CHRONICITY, UNSPECIFIED TYPE: ICD-10-CM

## 2023-06-21 DIAGNOSIS — I25.10 CORONARY ARTERY DISEASE INVOLVING NATIVE CORONARY ARTERY OF NATIVE HEART WITHOUT ANGINA PECTORIS: ICD-10-CM

## 2023-06-21 DIAGNOSIS — N18.30 HYPERTENSION ASSOCIATED WITH STAGE 3 CHRONIC KIDNEY DISEASE DUE TO TYPE 2 DIABETES MELLITUS: Chronic | ICD-10-CM

## 2023-06-21 DIAGNOSIS — E11.22 HYPERTENSION ASSOCIATED WITH STAGE 3 CHRONIC KIDNEY DISEASE DUE TO TYPE 2 DIABETES MELLITUS: Chronic | ICD-10-CM

## 2023-06-21 DIAGNOSIS — I70.0 AORTIC ATHEROSCLEROSIS: Primary | ICD-10-CM

## 2023-06-21 DIAGNOSIS — E78.2 MIXED DIABETIC HYPERLIPIDEMIA ASSOCIATED WITH TYPE 2 DIABETES MELLITUS: Chronic | ICD-10-CM

## 2023-06-21 PROCEDURE — 99999 PR PBB SHADOW E&M-EST. PATIENT-LVL IV: CPT | Mod: PBBFAC,,, | Performed by: INTERNAL MEDICINE

## 2023-06-21 PROCEDURE — 3066F NEPHROPATHY DOC TX: CPT | Mod: CPTII,S$GLB,, | Performed by: INTERNAL MEDICINE

## 2023-06-21 PROCEDURE — 93010 EKG 12-LEAD: ICD-10-PCS | Mod: S$GLB,,, | Performed by: INTERNAL MEDICINE

## 2023-06-21 PROCEDURE — 99999 PR PBB SHADOW E&M-EST. PATIENT-LVL IV: ICD-10-PCS | Mod: PBBFAC,,, | Performed by: INTERNAL MEDICINE

## 2023-06-21 PROCEDURE — 1101F PT FALLS ASSESS-DOCD LE1/YR: CPT | Mod: CPTII,S$GLB,, | Performed by: INTERNAL MEDICINE

## 2023-06-21 PROCEDURE — 3078F PR MOST RECENT DIASTOLIC BLOOD PRESSURE < 80 MM HG: ICD-10-PCS | Mod: CPTII,S$GLB,, | Performed by: INTERNAL MEDICINE

## 2023-06-21 PROCEDURE — 3061F PR NEG MICROALBUMINURIA RESULT DOCUMENTED/REVIEW: ICD-10-PCS | Mod: CPTII,S$GLB,, | Performed by: INTERNAL MEDICINE

## 2023-06-21 PROCEDURE — 1157F ADVNC CARE PLAN IN RCRD: CPT | Mod: CPTII,S$GLB,, | Performed by: INTERNAL MEDICINE

## 2023-06-21 PROCEDURE — 99214 OFFICE O/P EST MOD 30 MIN: CPT | Mod: 25,S$GLB,, | Performed by: INTERNAL MEDICINE

## 2023-06-21 PROCEDURE — 3008F BODY MASS INDEX DOCD: CPT | Mod: CPTII,S$GLB,, | Performed by: INTERNAL MEDICINE

## 2023-06-21 PROCEDURE — 3288F PR FALLS RISK ASSESSMENT DOCUMENTED: ICD-10-PCS | Mod: CPTII,S$GLB,, | Performed by: INTERNAL MEDICINE

## 2023-06-21 PROCEDURE — 93010 ELECTROCARDIOGRAM REPORT: CPT | Mod: S$GLB,,, | Performed by: INTERNAL MEDICINE

## 2023-06-21 PROCEDURE — 1159F PR MEDICATION LIST DOCUMENTED IN MEDICAL RECORD: ICD-10-PCS | Mod: CPTII,S$GLB,, | Performed by: INTERNAL MEDICINE

## 2023-06-21 PROCEDURE — 1159F MED LIST DOCD IN RCRD: CPT | Mod: CPTII,S$GLB,, | Performed by: INTERNAL MEDICINE

## 2023-06-21 PROCEDURE — 3078F DIAST BP <80 MM HG: CPT | Mod: CPTII,S$GLB,, | Performed by: INTERNAL MEDICINE

## 2023-06-21 PROCEDURE — 4010F ACE/ARB THERAPY RXD/TAKEN: CPT | Mod: CPTII,S$GLB,, | Performed by: INTERNAL MEDICINE

## 2023-06-21 PROCEDURE — 93005 ELECTROCARDIOGRAM TRACING: CPT | Mod: PO

## 2023-06-21 PROCEDURE — 99214 PR OFFICE/OUTPT VISIT, EST, LEVL IV, 30-39 MIN: ICD-10-PCS | Mod: 25,S$GLB,, | Performed by: INTERNAL MEDICINE

## 2023-06-21 PROCEDURE — 1160F RVW MEDS BY RX/DR IN RCRD: CPT | Mod: CPTII,S$GLB,, | Performed by: INTERNAL MEDICINE

## 2023-06-21 PROCEDURE — 1160F PR REVIEW ALL MEDS BY PRESCRIBER/CLIN PHARMACIST DOCUMENTED: ICD-10-PCS | Mod: CPTII,S$GLB,, | Performed by: INTERNAL MEDICINE

## 2023-06-21 PROCEDURE — 3051F PR MOST RECENT HEMOGLOBIN A1C LEVEL 7.0 - < 8.0%: ICD-10-PCS | Mod: CPTII,S$GLB,, | Performed by: INTERNAL MEDICINE

## 2023-06-21 PROCEDURE — 3077F SYST BP >= 140 MM HG: CPT | Mod: CPTII,S$GLB,, | Performed by: INTERNAL MEDICINE

## 2023-06-21 PROCEDURE — 3288F FALL RISK ASSESSMENT DOCD: CPT | Mod: CPTII,S$GLB,, | Performed by: INTERNAL MEDICINE

## 2023-06-21 PROCEDURE — 1157F PR ADVANCE CARE PLAN OR EQUIV PRESENT IN MEDICAL RECORD: ICD-10-PCS | Mod: CPTII,S$GLB,, | Performed by: INTERNAL MEDICINE

## 2023-06-21 PROCEDURE — 3061F NEG MICROALBUMINURIA REV: CPT | Mod: CPTII,S$GLB,, | Performed by: INTERNAL MEDICINE

## 2023-06-21 PROCEDURE — 3077F PR MOST RECENT SYSTOLIC BLOOD PRESSURE >= 140 MM HG: ICD-10-PCS | Mod: CPTII,S$GLB,, | Performed by: INTERNAL MEDICINE

## 2023-06-21 PROCEDURE — 4010F PR ACE/ARB THEARPY RXD/TAKEN: ICD-10-PCS | Mod: CPTII,S$GLB,, | Performed by: INTERNAL MEDICINE

## 2023-06-21 PROCEDURE — 1101F PR PT FALLS ASSESS DOC 0-1 FALLS W/OUT INJ PAST YR: ICD-10-PCS | Mod: CPTII,S$GLB,, | Performed by: INTERNAL MEDICINE

## 2023-06-21 PROCEDURE — 3066F PR DOCUMENTATION OF TREATMENT FOR NEPHROPATHY: ICD-10-PCS | Mod: CPTII,S$GLB,, | Performed by: INTERNAL MEDICINE

## 2023-06-21 PROCEDURE — 3008F PR BODY MASS INDEX (BMI) DOCUMENTED: ICD-10-PCS | Mod: CPTII,S$GLB,, | Performed by: INTERNAL MEDICINE

## 2023-06-21 PROCEDURE — 3051F HG A1C>EQUAL 7.0%<8.0%: CPT | Mod: CPTII,S$GLB,, | Performed by: INTERNAL MEDICINE

## 2023-06-26 ENCOUNTER — OFFICE VISIT (OUTPATIENT)
Dept: NEPHROLOGY | Facility: CLINIC | Age: 63
End: 2023-06-26
Payer: MEDICARE

## 2023-06-26 ENCOUNTER — TELEPHONE (OUTPATIENT)
Dept: ENDOCRINOLOGY | Facility: CLINIC | Age: 63
End: 2023-06-26

## 2023-06-26 VITALS
SYSTOLIC BLOOD PRESSURE: 130 MMHG | HEART RATE: 66 BPM | HEIGHT: 67 IN | BODY MASS INDEX: 31.97 KG/M2 | DIASTOLIC BLOOD PRESSURE: 58 MMHG | OXYGEN SATURATION: 96 %

## 2023-06-26 DIAGNOSIS — Z94.0 KIDNEY REPLACED BY TRANSPLANT: ICD-10-CM

## 2023-06-26 PROCEDURE — 99204 PR OFFICE/OUTPT VISIT, NEW, LEVL IV, 45-59 MIN: ICD-10-PCS | Mod: S$GLB,,, | Performed by: INTERNAL MEDICINE

## 2023-06-26 PROCEDURE — 99999 PR PBB SHADOW E&M-EST. PATIENT-LVL III: ICD-10-PCS | Mod: PBBFAC,,, | Performed by: INTERNAL MEDICINE

## 2023-06-26 PROCEDURE — 1101F PR PT FALLS ASSESS DOC 0-1 FALLS W/OUT INJ PAST YR: ICD-10-PCS | Mod: CPTII,S$GLB,, | Performed by: INTERNAL MEDICINE

## 2023-06-26 PROCEDURE — 3051F PR MOST RECENT HEMOGLOBIN A1C LEVEL 7.0 - < 8.0%: ICD-10-PCS | Mod: CPTII,S$GLB,, | Performed by: INTERNAL MEDICINE

## 2023-06-26 PROCEDURE — 3061F PR NEG MICROALBUMINURIA RESULT DOCUMENTED/REVIEW: ICD-10-PCS | Mod: CPTII,S$GLB,, | Performed by: INTERNAL MEDICINE

## 2023-06-26 PROCEDURE — 1157F ADVNC CARE PLAN IN RCRD: CPT | Mod: CPTII,S$GLB,, | Performed by: INTERNAL MEDICINE

## 2023-06-26 PROCEDURE — 3066F NEPHROPATHY DOC TX: CPT | Mod: CPTII,S$GLB,, | Performed by: INTERNAL MEDICINE

## 2023-06-26 PROCEDURE — 1159F MED LIST DOCD IN RCRD: CPT | Mod: CPTII,S$GLB,, | Performed by: INTERNAL MEDICINE

## 2023-06-26 PROCEDURE — 4010F PR ACE/ARB THEARPY RXD/TAKEN: ICD-10-PCS | Mod: CPTII,S$GLB,, | Performed by: INTERNAL MEDICINE

## 2023-06-26 PROCEDURE — 3008F BODY MASS INDEX DOCD: CPT | Mod: CPTII,S$GLB,, | Performed by: INTERNAL MEDICINE

## 2023-06-26 PROCEDURE — 99999 PR PBB SHADOW E&M-EST. PATIENT-LVL III: CPT | Mod: PBBFAC,,, | Performed by: INTERNAL MEDICINE

## 2023-06-26 PROCEDURE — 4010F ACE/ARB THERAPY RXD/TAKEN: CPT | Mod: CPTII,S$GLB,, | Performed by: INTERNAL MEDICINE

## 2023-06-26 PROCEDURE — 3008F PR BODY MASS INDEX (BMI) DOCUMENTED: ICD-10-PCS | Mod: CPTII,S$GLB,, | Performed by: INTERNAL MEDICINE

## 2023-06-26 PROCEDURE — 3061F NEG MICROALBUMINURIA REV: CPT | Mod: CPTII,S$GLB,, | Performed by: INTERNAL MEDICINE

## 2023-06-26 PROCEDURE — 3066F PR DOCUMENTATION OF TREATMENT FOR NEPHROPATHY: ICD-10-PCS | Mod: CPTII,S$GLB,, | Performed by: INTERNAL MEDICINE

## 2023-06-26 PROCEDURE — 3288F FALL RISK ASSESSMENT DOCD: CPT | Mod: CPTII,S$GLB,, | Performed by: INTERNAL MEDICINE

## 2023-06-26 PROCEDURE — 3288F PR FALLS RISK ASSESSMENT DOCUMENTED: ICD-10-PCS | Mod: CPTII,S$GLB,, | Performed by: INTERNAL MEDICINE

## 2023-06-26 PROCEDURE — 1157F PR ADVANCE CARE PLAN OR EQUIV PRESENT IN MEDICAL RECORD: ICD-10-PCS | Mod: CPTII,S$GLB,, | Performed by: INTERNAL MEDICINE

## 2023-06-26 PROCEDURE — 1159F PR MEDICATION LIST DOCUMENTED IN MEDICAL RECORD: ICD-10-PCS | Mod: CPTII,S$GLB,, | Performed by: INTERNAL MEDICINE

## 2023-06-26 PROCEDURE — 3051F HG A1C>EQUAL 7.0%<8.0%: CPT | Mod: CPTII,S$GLB,, | Performed by: INTERNAL MEDICINE

## 2023-06-26 PROCEDURE — 3078F DIAST BP <80 MM HG: CPT | Mod: CPTII,S$GLB,, | Performed by: INTERNAL MEDICINE

## 2023-06-26 PROCEDURE — 3075F PR MOST RECENT SYSTOLIC BLOOD PRESS GE 130-139MM HG: ICD-10-PCS | Mod: CPTII,S$GLB,, | Performed by: INTERNAL MEDICINE

## 2023-06-26 PROCEDURE — 99204 OFFICE O/P NEW MOD 45 MIN: CPT | Mod: S$GLB,,, | Performed by: INTERNAL MEDICINE

## 2023-06-26 PROCEDURE — 1160F RVW MEDS BY RX/DR IN RCRD: CPT | Mod: CPTII,S$GLB,, | Performed by: INTERNAL MEDICINE

## 2023-06-26 PROCEDURE — 3075F SYST BP GE 130 - 139MM HG: CPT | Mod: CPTII,S$GLB,, | Performed by: INTERNAL MEDICINE

## 2023-06-26 PROCEDURE — 1160F PR REVIEW ALL MEDS BY PRESCRIBER/CLIN PHARMACIST DOCUMENTED: ICD-10-PCS | Mod: CPTII,S$GLB,, | Performed by: INTERNAL MEDICINE

## 2023-06-26 PROCEDURE — 3078F PR MOST RECENT DIASTOLIC BLOOD PRESSURE < 80 MM HG: ICD-10-PCS | Mod: CPTII,S$GLB,, | Performed by: INTERNAL MEDICINE

## 2023-06-26 PROCEDURE — 1101F PT FALLS ASSESS-DOCD LE1/YR: CPT | Mod: CPTII,S$GLB,, | Performed by: INTERNAL MEDICINE

## 2023-06-26 NOTE — PROGRESS NOTES
Subjective:       Patient ID: Andra Newell is a 63 y.o. White female who presents for new patient evaluation for chronic renal failure.    Andra Newell is referred by Fady Vaughn MD to be evaluated for chronic renal failure.  She is s/p kidney transplant in 2019.  She is on prograf 1/1.  She was placed on jardiance about a month ago and also has had a Proteus UTI as well as an EGD recently.  She has lost about 40 pounds in the past year as well.  Her cholesterol medication was stopped recently due to transaminiitis.  She states she wants her AVF taken down due to the fact it is getting larger and is sore sometimes.  She had a recent creatinine a month ago which was elevated and thus was referred.      Review of Systems   Constitutional:  Negative for appetite change, chills and fever.   HENT:  Negative for congestion.    Eyes:  Negative for visual disturbance.   Respiratory:  Negative for cough and shortness of breath.    Cardiovascular:  Positive for chest pain. Negative for leg swelling.   Gastrointestinal:  Positive for nausea (chronic\). Negative for abdominal pain, diarrhea and vomiting.   Genitourinary:  Negative for difficulty urinating, dysuria and hematuria.   Musculoskeletal:  Negative for myalgias.   Skin:  Negative for rash.   Neurological:  Positive for headaches (from NTG).   Psychiatric/Behavioral:  Positive for dysphoric mood. Negative for sleep disturbance.        The past medical, family and social histories were reviewed for this encounter.     Past Medical History:   Diagnosis Date    Anemia     Anemia in chronic kidney disease, on chronic dialysis 7/12/2017    Anxiety and depression     Aplastic anemia with parvovirus B19 infection 5/27/2019    Aplastic anemia with parvovirus B19 infection 4/2019 5/27/2019    Arthritis     Asthma     allergic airway    CKD stage III (moderate) 2/18/2019    Colon polyp     Depression     Diabetes mellitus     Diverticulosis      "Encounter for blood transfusion     Eosinophilia     ESRD (end stage renal disease)     stage V, due for living donor 9/2018    ESRD on dialysis     GERD (gastroesophageal reflux disease)     Gout     Gout     Hyperlipidemia     Hypertension     Hypertriglyceridemia without hypercholesterolemia 6/9/2019    Low back pain     Low HDL (under 40) 6/9/2019    MRSA carrier     Neutropenia, unspecified 5/8/2019    Obesity     Renal disorder     Rotator cuff syndrome--left     Thyroid disease     Ulnar neuropathy of right upper extremity     Uncontrolled type 2 diabetes mellitus with hyperglycemia      Past Surgical History:   Procedure Laterality Date    ACHILLES TENDON SURGERY Left 05/2015    BACK SURGERY      CERVICAL SPINE SURGERY  2021    cervical gate placed by Dr. Vera- screws,plate, and "gate"    CHOLECYSTECTOMY      COLONOSCOPY N/A 09/06/2017    Procedure: COLONOSCOPY;  Surgeon: Marisol Bryson MD;  Location: [facility];  Service: Endoscopy;  Laterality: N/A; REPEAT IN 3 YEARS FOR SURVEILLANCE    COLONOSCOPY N/A 05/09/2019    Procedure: COLONOSCOPY;  Surgeon: Fady Ewing MD;  Location: [facility] (2ND FLR);  Service: Endoscopy;  Laterality: N/A;    CORONARY ANGIOGRAPHY  08/31/2022    Procedure: ANGIOGRAM, CORONARY ARTERY;  Surgeon: Christoph Dang MD;  Location: [facility] CATH;  Service: Cardiology;;    DIALYSIS FISTULA CREATION  12/2017    ESOPHAGOGASTRODUODENOSCOPY N/A 05/09/2019    Procedure: EGD (ESOPHAGOGASTRODUODENOSCOPY);  Surgeon: Fady Ewing MD;  Location: [facility] (2ND FLR);  Service: Endoscopy;  Laterality: N/A;    ESOPHAGOGASTRODUODENOSCOPY N/A 06/10/2021    Procedure: EGD (ESOPHAGOGASTRODUODENOSCOPY);  Surgeon: Marisol Bryson MD;  Location: [facility] ENDO;  Service: Endoscopy;  Laterality: N/A;    ESOPHAGOGASTRODUODENOSCOPY N/A 5/31/2023    Procedure: EGD (ESOPHAGOGASTRODUODENOSCOPY);  Surgeon: Marisol Bryson MD;  Location: [facility] ENDO;  Service: Endoscopy;  Laterality: N/A;    FRACTURE " SURGERY  1990    Ankle knee    HEMORRHOID SURGERY      INJECTION OF ANESTHETIC AGENT AROUND MEDIAL BRANCH NERVES INNERVATING LUMBAR FACET JOINT Right 09/25/2019    Procedure: Block-nerve-medial branch-lumbar;  Surgeon: Yonny Ferrer MD;  Location: [facility] OR;  Service: Pain Management;  Laterality: Right;  L2, 3, 4,5     INJECTION OF ANESTHETIC AGENT AROUND MEDIAL BRANCH NERVES INNERVATING LUMBAR FACET JOINT Right 10/16/2019    Procedure: Block-nerve-medial branch-lumbar;  Surgeon: Yonny Ferrer MD;  Location: [facility] OR;  Service: Pain Management;  Laterality: Right;  L2, 3, 4,5     JOINT REPLACEMENT      left    KIDNEY TRANSPLANT N/A 01/14/2019    Procedure: TRANSPLANT, KIDNEY;  Surgeon: Roland Salazar MD;  Location: [facility] OR [address] [place] [address];  Service: Transplant;  Laterality: N/A;    KNEE SURGERY      RADIOFREQUENCY ABLATION OF LUMBAR MEDIAL BRANCH NERVE AT SINGLE LEVEL Right 10/29/2019    Procedure: Radiofrequency Ablation, Nerve, Spinal, Lumbar, Medial Branch, 1 Level;  Surgeon: Yonny Ferrer MD;  Location: [facility] OR;  Service: Pain Management;  Laterality: Right;  L2, 3, 4, 5  burned at 80 degrees C X 60 seconds each site X 2    SHOULDER ARTHROSCOPY      SHOULDER SURGERY      SPINE SURGERY  2020    Pain pump in back    UPPER GASTROINTESTINAL ENDOSCOPY  ~2013    Dr. Moralez     Social History     Socioeconomic History    Marital status: Significant Other     Spouse name: Any Alvarez    Number of children: 2    Highest education level: 12th grade   Occupational History    Occupation: retired     Comment:    Tobacco Use    Smoking status: Never    Smokeless tobacco: Never   Substance and Sexual Activity    Alcohol use: No     Alcohol/week: 0.0 standard drinks    Drug use: No    Sexual activity: Yes     Partners: Female   Social History Narrative     female    Disabled since 2015 due to DDD and severe osteoarthritis of knee and hips    Previously worked as  at [employer]  in residential 1 story home with partner, Any.     Has 2 boys, 1 lives in [place], 1 in [place].         Tenriism: Latter-day    Hobbies: painting and crafts.       Current Outpatient Medications   Medication Sig    acetaminophen (TYLENOL) 325 MG tablet Take 2 tablets (650 mg total) by mouth every 8 (eight) hours as needed for Pain.    aspirin (ECOTRIN) 81 MG EC tablet Take 1 tablet (81 mg total) by mouth once daily.    clopidogreL (PLAVIX) 75 mg tablet TAKE ONE TABLET BY MOUTH DAILY    empagliflozin (JARDIANCE) 10 mg tablet Take 1 tablet (10 mg total) by mouth once daily.    famotidine (PEPCID) 40 MG tablet Take 1 tablet (40 mg total) by mouth every evening.    insulin degludec (TRESIBA FLEXTOUCH U-200) 200 unit/mL (3 mL) insulin pen Inject 76 Units into the skin once daily.    isosorbide mononitrate (IMDUR) 30 MG 24 hr tablet TAKE ONE TABLET BY MOUTH DAILY    lansoprazole (PREVACID) 30 MG capsule Take 1 capsule (30 mg total) by mouth 2 (two) times a day.    levothyroxine (SYNTHROID) 75 MCG tablet TAKE ONE TABLET BY MOUTH ONCE DAILY    LIDOcaine (LIDODERM) 5 % 1 patch.    LORazepam (ATIVAN) 1 MG tablet Take 1 mg by mouth every evening.    losartan (COZAAR) 50 MG tablet Take 1 tablet (50 mg total) by mouth once daily.    magnesium oxide 500 mg Tab Take 500 mg by mouth once daily.    metFORMIN (GLUCOPHAGE-XR) 500 MG ER 24hr tablet Take 1 tablet (500 mg total) by mouth 2 (two) times daily with meals.    metoprolol tartrate (LOPRESSOR) 25 MG tablet Take 0.5 tablets (12.5 mg total) by mouth 2 (two) times daily.    multivitamin (THERAGRAN) tablet Take 1 tablet by mouth once daily.    sucralfate (CARAFATE) 1 gram tablet Take 1 tablet (1 g total) by mouth every 6 (six) hours as needed (GERD).    tacrolimus (PROGRAF) 1 MG Cap Take 1 capsule (1 mg total) by mouth every 12 (twelve) hours.    tiZANidine (ZANAFLEX) 4 MG tablet Take 1 tablet by mouth every evening.    vilazodone (VIIBRYD) 40 mg Tab tablet Take 1 tablet (40 mg  "total) by mouth once daily.    BD LENIN 2ND GEN PEN NEEDLE 32 gauge x 5/32" Ndle Use up to four daily to inject novolog (Patient not taking: Reported on 6/26/2023)    blood sugar diagnostic Strp Check glucose 3 (three) times daily. (Patient not taking: Reported on 6/26/2023)    blood-glucose sensor (DEXCOM G7 SENSOR) Amparo Change every 10 days (Patient not taking: Reported on 6/26/2023)    colchicine (COLCRYS) 0.6 mg tablet Take 1 tablet (0.6 mg total) by mouth 2 (two) times daily. (Patient not taking: Reported on 6/26/2023)    DEXCOM G7  Misc Dispense 1 (Patient not taking: Reported on 6/26/2023)    flash glucose sensor (FREESTYLE DAKOTAH 14 DAY SENSOR) Kit Use 1 sensor every 14 days to check blood glucose (Patient not taking: Reported on 6/26/2023)    insulin aspart U-100 (NOVOLOG FLEXPEN U-100 INSULIN) 100 unit/mL (3 mL) InPn pen 32 u breakfast and supper, 28 u lunch + correction. Max TDD 140u    lancets (ONETOUCH ULTRASOFT LANCETS) Misc Use to test blood sugar 3 x's a day (Patient not taking: Reported on 6/26/2023)    nitroGLYCERIN (NITROSTAT) 0.4 MG SL tablet Place 1 tablet (0.4 mg total) under the tongue every 5 (five) minutes as needed for Chest pain. (Patient not taking: Reported on 6/26/2023)    ondansetron (ZOFRAN) 4 MG tablet Take 1 tablet (4 mg total) by mouth every 6 (six) hours. (Patient not taking: Reported on 6/26/2023)     No current facility-administered medications for this visit.       BP (!) 130/58 (BP Location: Right arm, Patient Position: Sitting, BP Method: Large (Manual))   Pulse 66   Ht 5' 7" (1.702 m)   LMP 02/28/2012   SpO2 96%   BMI 31.97 kg/m²     Objective:      Physical Exam  Vitals reviewed.   Constitutional:       General: She is not in acute distress.     Appearance: She is well-developed.   HENT:      Head: Normocephalic and atraumatic.   Eyes:      General: No scleral icterus.     Conjunctiva/sclera: Conjunctivae normal.   Neck:      Vascular: No JVD.   Cardiovascular: "      Rate and Rhythm: Normal rate and regular rhythm.      Heart sounds: Normal heart sounds. No murmur heard.    No friction rub. No gallop.   Pulmonary:      Effort: Pulmonary effort is normal. No respiratory distress.      Breath sounds: Normal breath sounds. No wheezing.   Abdominal:      General: Bowel sounds are normal. There is no distension.      Palpations: Abdomen is soft.      Tenderness: There is no abdominal tenderness.   Musculoskeletal:      Cervical back: Normal range of motion.      Right lower leg: No edema.      Left lower leg: No edema.      Comments: LUE AVF with one aneurysmal area with bruit   Skin:     General: Skin is warm and dry.      Findings: No rash.   Neurological:      Mental Status: She is alert and oriented to person, place, and time.   Psychiatric:         Mood and Affect: Mood normal.         Behavior: Behavior normal.       Assessment:       1. Kidney replaced by transplant        Plan:   Return to clinic in 4 months.  Labs for next visit include labs per standing orders.  RP ua micro this week.  We will contact her with the results.  Baseline creatinine is 0.9-1.0 since her transplant..  PG level is 6.0.  Plan to repeat the labs today and examine her urine.  It is possible this has occurred due to her jardiance.  She also had a UTI around the time her lab was drawn showing an elevated creatinine.

## 2023-06-30 ENCOUNTER — LAB VISIT (OUTPATIENT)
Dept: LAB | Facility: HOSPITAL | Age: 63
End: 2023-06-30
Attending: NURSE PRACTITIONER

## 2023-06-30 DIAGNOSIS — R07.9 CHEST PAIN, UNSPECIFIED TYPE: ICD-10-CM

## 2023-06-30 DIAGNOSIS — Z94.0 KIDNEY REPLACED BY TRANSPLANT: ICD-10-CM

## 2023-06-30 DIAGNOSIS — E11.22 TYPE 2 DIABETES MELLITUS WITH STAGE 3A CHRONIC KIDNEY DISEASE, WITH LONG-TERM CURRENT USE OF INSULIN: ICD-10-CM

## 2023-06-30 DIAGNOSIS — N18.31 TYPE 2 DIABETES MELLITUS WITH STAGE 3A CHRONIC KIDNEY DISEASE, WITH LONG-TERM CURRENT USE OF INSULIN: ICD-10-CM

## 2023-06-30 DIAGNOSIS — Z79.4 TYPE 2 DIABETES MELLITUS WITH STAGE 3A CHRONIC KIDNEY DISEASE, WITH LONG-TERM CURRENT USE OF INSULIN: ICD-10-CM

## 2023-06-30 DIAGNOSIS — I10 ESSENTIAL HYPERTENSION: ICD-10-CM

## 2023-06-30 LAB
ALBUMIN SERPL BCP-MCNC: 3.7 G/DL (ref 3.5–5.2)
ALBUMIN SERPL BCP-MCNC: 3.7 G/DL (ref 3.5–5.2)
ALP SERPL-CCNC: 108 U/L (ref 55–135)
ALT SERPL W/O P-5'-P-CCNC: 55 U/L (ref 10–44)
ANION GAP SERPL CALC-SCNC: 10 MMOL/L (ref 8–16)
AST SERPL-CCNC: 64 U/L (ref 10–40)
BACTERIA #/AREA URNS AUTO: ABNORMAL /HPF
BILIRUB SERPL-MCNC: 0.7 MG/DL (ref 0.1–1)
BILIRUB UR QL STRIP: NEGATIVE
BUN SERPL-MCNC: 17 MG/DL (ref 8–23)
CALCIUM SERPL-MCNC: 9.6 MG/DL (ref 8.7–10.5)
CHLORIDE SERPL-SCNC: 105 MMOL/L (ref 95–110)
CLARITY UR REFRACT.AUTO: CLEAR
CO2 SERPL-SCNC: 29 MMOL/L (ref 23–29)
COLOR UR AUTO: YELLOW
CREAT SERPL-MCNC: 1.5 MG/DL (ref 0.5–1.4)
EST. GFR  (NO RACE VARIABLE): 38.4 ML/MIN/1.73 M^2
ESTIMATED AVG GLUCOSE: 146 MG/DL (ref 68–131)
GLUCOSE SERPL-MCNC: 101 MG/DL (ref 70–110)
GLUCOSE UR QL STRIP: ABNORMAL
HBA1C MFR BLD: 6.7 % (ref 4–5.6)
HGB UR QL STRIP: NEGATIVE
KETONES UR QL STRIP: NEGATIVE
LEUKOCYTE ESTERASE UR QL STRIP: ABNORMAL
MAGNESIUM SERPL-MCNC: 1.4 MG/DL (ref 1.6–2.6)
MICROSCOPIC COMMENT: ABNORMAL
NITRITE UR QL STRIP: NEGATIVE
NON-SQ EPI CELLS #/AREA URNS AUTO: 2 /HPF
PH UR STRIP: 6 [PH] (ref 5–8)
PHOSPHATE SERPL-MCNC: 4.6 MG/DL (ref 2.7–4.5)
POTASSIUM SERPL-SCNC: 4.7 MMOL/L (ref 3.5–5.1)
PROT SERPL-MCNC: 7.3 G/DL (ref 6–8.4)
PROT UR QL STRIP: NEGATIVE
RBC #/AREA URNS AUTO: 1 /HPF (ref 0–4)
SODIUM SERPL-SCNC: 144 MMOL/L (ref 136–145)
SP GR UR STRIP: 1.02 (ref 1–1.03)
SQUAMOUS #/AREA URNS AUTO: 3 /HPF
URN SPEC COLLECT METH UR: ABNORMAL
WBC #/AREA URNS AUTO: 12 /HPF (ref 0–5)
YEAST UR QL AUTO: ABNORMAL

## 2023-06-30 PROCEDURE — 80069 RENAL FUNCTION PANEL: CPT | Performed by: INTERNAL MEDICINE

## 2023-06-30 PROCEDURE — 81001 URINALYSIS AUTO W/SCOPE: CPT | Mod: 91 | Performed by: INTERNAL MEDICINE

## 2023-06-30 PROCEDURE — 80053 COMPREHEN METABOLIC PANEL: CPT | Performed by: INTERNAL MEDICINE

## 2023-06-30 PROCEDURE — 83735 ASSAY OF MAGNESIUM: CPT | Performed by: INTERNAL MEDICINE

## 2023-06-30 PROCEDURE — 83036 HEMOGLOBIN GLYCOSYLATED A1C: CPT | Performed by: NURSE PRACTITIONER

## 2023-06-30 PROCEDURE — 36415 COLL VENOUS BLD VENIPUNCTURE: CPT | Mod: PN | Performed by: INTERNAL MEDICINE

## 2023-07-01 ENCOUNTER — PATIENT MESSAGE (OUTPATIENT)
Dept: TRANSPLANT | Facility: CLINIC | Age: 63
End: 2023-07-01

## 2023-07-01 LAB
BACTERIA #/AREA URNS AUTO: ABNORMAL /HPF
MICROSCOPIC COMMENT: ABNORMAL
NON-SQ EPI CELLS #/AREA URNS AUTO: 1 /HPF
RBC #/AREA URNS AUTO: 2 /HPF (ref 0–4)
SQUAMOUS #/AREA URNS AUTO: 4 /HPF
WBC #/AREA URNS AUTO: 12 /HPF (ref 0–5)

## 2023-07-02 ENCOUNTER — PATIENT MESSAGE (OUTPATIENT)
Dept: NEPHROLOGY | Facility: CLINIC | Age: 63
End: 2023-07-02

## 2023-07-03 ENCOUNTER — TELEPHONE (OUTPATIENT)
Dept: TRANSPLANT | Facility: CLINIC | Age: 63
End: 2023-07-03

## 2023-07-03 ENCOUNTER — PATIENT MESSAGE (OUTPATIENT)
Dept: GASTROENTEROLOGY | Facility: CLINIC | Age: 63
End: 2023-07-03

## 2023-07-03 ENCOUNTER — TELEPHONE (OUTPATIENT)
Dept: NEPHROLOGY | Facility: CLINIC | Age: 63
End: 2023-07-03

## 2023-07-03 DIAGNOSIS — E83.42 HYPOMAGNESEMIA: Chronic | ICD-10-CM

## 2023-07-03 DIAGNOSIS — I10 ESSENTIAL HYPERTENSION: Primary | ICD-10-CM

## 2023-07-03 DIAGNOSIS — Z94.0 KIDNEY REPLACED BY TRANSPLANT: Primary | ICD-10-CM

## 2023-07-03 NOTE — TELEPHONE ENCOUNTER
----- Message from Rachele Warner sent at 7/3/2023  9:16 AM CDT -----  Contact: Any Agarwal (career) is calling asking about results for labs done 6/30. Sts donald is not feel well, not eating, weight loss, nauseated sugar dropped to 50 last night.  Requesting a call back Any  to advise  Thanks

## 2023-07-03 NOTE — TELEPHONE ENCOUNTER
Patient repeated back and voice a understanding of orders and will repeat Renal Panel on Monday 7/10.  Patient voice a understanding that she needs to be drinking over 2 liters of H2O daily.  ----- Message from Maureen Cabral MD sent at 7/3/2023  1:42 PM CDT -----  Regarding: RE: Returning a Missed Call  Contact: Andra Newell  Recommend increased hydration and repeat renal panel in a week.    ----- Message -----  From: Oracio Hendricks RN  Sent: 7/3/2023  12:02 PM CDT  To: Maureen Cabral MD  Subject: FW: Returning a Missed Call                      Dr Mercado patient is asking you to review her labs from 6/30 and advise.  She is concern about her creatinine trending up. 1.5 on Friday 6/30.  She C/O of Chronic Nausea and Vomiting several times weekly x several weeks.  She is S/P a MI in 12/2022 and again on 6/19/2023.  She sis also followed by GI last seen on 5/10/2023.   ----- Message -----  From: Bethel Meléndez  Sent: 7/3/2023  10:06 AM CDT  To: [facility] Post-Kidney Transplant Clinical  Subject: Returning a Missed Call                          Returning a Missed Call    Caller: Andra Newell      Returning call to: kellen Narayanan       Caller can be reached @: [phone] ([place]) or  Any [phone]    Nature of the call: Patient calling regarding recent blood work and kidney function. Requesting a call back to Any, patient's partner.

## 2023-07-03 NOTE — TELEPHONE ENCOUNTER
----- Message from Maureen Lakhani LPN sent at 7/3/2023  2:42 PM CDT -----  Pleaser see creatinine level

## 2023-07-03 NOTE — TELEPHONE ENCOUNTER
Please comment on her recent chemistry from Dr Hurtado.  You can answer via mychart.     Nausea x 2-3 months with 40llb untentional weight loss over about 3 months. Fatigue and some weakness.   Sleeping more. Chest pain (addressed by cardiology after ER)  No swelling.  Shortness of breath on exertion , denies SOB at rest.

## 2023-07-05 ENCOUNTER — PATIENT MESSAGE (OUTPATIENT)
Dept: NEPHROLOGY | Facility: CLINIC | Age: 63
End: 2023-07-05

## 2023-07-05 NOTE — TELEPHONE ENCOUNTER
Creatinine is unchanged.  I am curious to know if your symptoms started after you started taking jardiance or before you started that medication.     Moving all extremities

## 2023-07-06 DIAGNOSIS — R11.0 NAUSEA: Primary | ICD-10-CM

## 2023-07-07 ENCOUNTER — HOSPITAL ENCOUNTER (OUTPATIENT)
Facility: HOSPITAL | Age: 63
Discharge: HOME | End: 2023-07-11
Attending: EMERGENCY MEDICINE | Admitting: HOSPITALIST
Payer: MEDICARE

## 2023-07-07 ENCOUNTER — PATIENT MESSAGE (OUTPATIENT)
Dept: ENDOCRINOLOGY | Facility: CLINIC | Age: 63
End: 2023-07-07

## 2023-07-07 DIAGNOSIS — I25.119 CHEST PAIN DUE TO CAD: ICD-10-CM

## 2023-07-07 DIAGNOSIS — I12.9 HYPERTENSION ASSOCIATED WITH STAGE 3 CHRONIC KIDNEY DISEASE DUE TO TYPE 2 DIABETES MELLITUS: Primary | ICD-10-CM

## 2023-07-07 DIAGNOSIS — R07.9 CHEST PAIN: ICD-10-CM

## 2023-07-07 DIAGNOSIS — E11.22 HYPERTENSION ASSOCIATED WITH STAGE 3 CHRONIC KIDNEY DISEASE DUE TO TYPE 2 DIABETES MELLITUS: Primary | ICD-10-CM

## 2023-07-07 DIAGNOSIS — N18.30 HYPERTENSION ASSOCIATED WITH STAGE 3 CHRONIC KIDNEY DISEASE DUE TO TYPE 2 DIABETES MELLITUS: Primary | ICD-10-CM

## 2023-07-07 LAB
ALBUMIN SERPL BCP-MCNC: 4.3 G/DL (ref 3.5–5.2)
ALP SERPL-CCNC: 128 U/L (ref 55–135)
ALT SERPL W/O P-5'-P-CCNC: 75 U/L (ref 10–44)
ANION GAP SERPL CALC-SCNC: 14 MMOL/L (ref 8–16)
AST SERPL-CCNC: 93 U/L (ref 10–40)
BASOPHILS # BLD AUTO: 0.05 K/UL (ref 0–0.2)
BASOPHILS NFR BLD: 0.6 % (ref 0–1.9)
BILIRUB SERPL-MCNC: 0.6 MG/DL (ref 0.1–1)
BNP SERPL-MCNC: 18 PG/ML (ref 0–99)
BUN SERPL-MCNC: 24 MG/DL (ref 8–23)
CALCIUM SERPL-MCNC: 10.2 MG/DL (ref 8.7–10.5)
CHLORIDE SERPL-SCNC: 101 MMOL/L (ref 95–110)
CO2 SERPL-SCNC: 28 MMOL/L (ref 23–29)
CREAT SERPL-MCNC: 1.4 MG/DL (ref 0.5–1.4)
DIFFERENTIAL METHOD: ABNORMAL
EOSINOPHIL # BLD AUTO: 0.7 K/UL (ref 0–0.5)
EOSINOPHIL NFR BLD: 7.9 % (ref 0–8)
ERYTHROCYTE [DISTWIDTH] IN BLOOD BY AUTOMATED COUNT: 15.6 % (ref 11.5–14.5)
EST. GFR  (NO RACE VARIABLE): 42 ML/MIN/1.73 M^2
GLUCOSE SERPL-MCNC: 110 MG/DL (ref 70–110)
HCT VFR BLD AUTO: 38.1 % (ref 37–48.5)
HGB BLD-MCNC: 11.7 G/DL (ref 12–16)
IMM GRANULOCYTES # BLD AUTO: 0.03 K/UL (ref 0–0.04)
IMM GRANULOCYTES NFR BLD AUTO: 0.3 % (ref 0–0.5)
LYMPHOCYTES # BLD AUTO: 1.7 K/UL (ref 1–4.8)
LYMPHOCYTES NFR BLD: 18.5 % (ref 18–48)
MCH RBC QN AUTO: 26.8 PG (ref 27–31)
MCHC RBC AUTO-ENTMCNC: 30.7 G/DL (ref 32–36)
MCV RBC AUTO: 87 FL (ref 82–98)
MONOCYTES # BLD AUTO: 0.7 K/UL (ref 0.3–1)
MONOCYTES NFR BLD: 7.6 % (ref 4–15)
NEUTROPHILS # BLD AUTO: 5.8 K/UL (ref 1.8–7.7)
NEUTROPHILS NFR BLD: 65.1 % (ref 38–73)
NRBC BLD-RTO: 0 /100 WBC
PLATELET # BLD AUTO: 182 K/UL (ref 150–450)
PMV BLD AUTO: 10.1 FL (ref 9.2–12.9)
POTASSIUM SERPL-SCNC: 4.9 MMOL/L (ref 3.5–5.1)
PROT SERPL-MCNC: 8.2 G/DL (ref 6–8.4)
RBC # BLD AUTO: 4.36 M/UL (ref 4–5.4)
SODIUM SERPL-SCNC: 143 MMOL/L (ref 136–145)
TROPONIN I SERPL DL<=0.01 NG/ML-MCNC: 0.01 NG/ML (ref 0–0.03)
TROPONIN I SERPL DL<=0.01 NG/ML-MCNC: 0.01 NG/ML (ref 0–0.03)
WBC # BLD AUTO: 8.94 K/UL (ref 3.9–12.7)

## 2023-07-07 PROCEDURE — 36415 COLL VENOUS BLD VENIPUNCTURE: CPT | Performed by: PHYSICIAN ASSISTANT

## 2023-07-07 PROCEDURE — 84484 ASSAY OF TROPONIN QUANT: CPT | Mod: 91 | Performed by: PHYSICIAN ASSISTANT

## 2023-07-07 PROCEDURE — 63600175 PHARM REV CODE 636 W HCPCS: Performed by: PHYSICIAN ASSISTANT

## 2023-07-07 PROCEDURE — 99285 EMERGENCY DEPT VISIT HI MDM: CPT | Mod: 25

## 2023-07-07 PROCEDURE — 93010 EKG 12-LEAD: ICD-10-PCS | Mod: ,,, | Performed by: INTERNAL MEDICINE

## 2023-07-07 PROCEDURE — 93005 ELECTROCARDIOGRAM TRACING: CPT

## 2023-07-07 PROCEDURE — 25000003 PHARM REV CODE 250: Performed by: PHYSICIAN ASSISTANT

## 2023-07-07 PROCEDURE — 93010 ELECTROCARDIOGRAM REPORT: CPT | Mod: ,,, | Performed by: INTERNAL MEDICINE

## 2023-07-07 PROCEDURE — 96374 THER/PROPH/DIAG INJ IV PUSH: CPT

## 2023-07-07 PROCEDURE — 80053 COMPREHEN METABOLIC PANEL: CPT | Performed by: PHYSICIAN ASSISTANT

## 2023-07-07 PROCEDURE — 85025 COMPLETE CBC W/AUTO DIFF WBC: CPT | Performed by: PHYSICIAN ASSISTANT

## 2023-07-07 PROCEDURE — 94761 N-INVAS EAR/PLS OXIMETRY MLT: CPT

## 2023-07-07 PROCEDURE — 83880 ASSAY OF NATRIURETIC PEPTIDE: CPT | Performed by: PHYSICIAN ASSISTANT

## 2023-07-07 RX ORDER — ASPIRIN 325 MG
325 TABLET ORAL
Status: COMPLETED | OUTPATIENT
Start: 2023-07-07 | End: 2023-07-07

## 2023-07-07 RX ORDER — ONDANSETRON 2 MG/ML
4 INJECTION INTRAMUSCULAR; INTRAVENOUS
Status: COMPLETED | OUTPATIENT
Start: 2023-07-07 | End: 2023-07-07

## 2023-07-07 RX ORDER — IRBESARTAN 150 MG/1
1 TABLET ORAL DAILY
Status: ON HOLD | COMMUNITY
End: 2023-07-11 | Stop reason: HOSPADM

## 2023-07-07 RX ORDER — AMLODIPINE BESYLATE 5 MG/1
1 TABLET ORAL DAILY
COMMUNITY

## 2023-07-07 RX ORDER — CARIPRAZINE 3 MG/1
3 CAPSULE, GELATIN COATED ORAL DAILY
COMMUNITY
Start: 2023-06-28

## 2023-07-07 RX ORDER — DULOXETIN HYDROCHLORIDE 30 MG/1
30 CAPSULE, DELAYED RELEASE ORAL DAILY
COMMUNITY

## 2023-07-07 RX ORDER — DOXAZOSIN 2 MG/1
2 TABLET ORAL DAILY
COMMUNITY

## 2023-07-07 RX ADMIN — NITROGLYCERIN 1 INCH: 20 OINTMENT TOPICAL at 09:07

## 2023-07-07 RX ADMIN — ONDANSETRON 4 MG: 2 INJECTION INTRAMUSCULAR; INTRAVENOUS at 09:07

## 2023-07-07 RX ADMIN — ASPIRIN 325 MG: 325 TABLET ORAL at 09:07

## 2023-07-07 NOTE — Clinical Note
The catheter was removed from the ostium   right coronary artery. Hemodynamics were performed.  and Pullback was recorded.

## 2023-07-07 NOTE — FIRST PROVIDER EVALUATION
" Emergency Department TeleTriage Encounter Note      CHIEF COMPLAINT    Chief Complaint   Patient presents with    Chest Pain     S&S x1 week.  Chest pain, N&V. Pt has stent placed, has been taking one nitroglycerin daily "with only a little relief for 5 minutes"  was seen and discharged from [facility] ER when S&S started 1 week ago.       VITAL SIGNS   Initial Vitals [07/07/23 1727]   BP Pulse Resp Temp SpO2   (!) 146/69 86 20 98.3 °F (36.8 °C) 96 %      MAP       --            ALLERGIES    Review of patient's allergies indicates:   Allergen Reactions    Dilaudid [hydromorphone] Itching    Morphine Itching    Torsemide Diarrhea     Diarrhea stopped once discontinued med    Codeine Itching     Can take oxycodone and hydrocodone with Benadryl       PROVIDER TRIAGE NOTE  Patient presents with waxing and waning chest pain, nausea, vomiting and fatigue and shortness of breath. Nitro helps for about 5 minutes.       ORDERS  Labs Reviewed - No data to display    ED Orders (720h ago, onward)      Start Ordered     Status Ordering Provider    07/07/23 1708 07/07/23 1707  EKG 12-lead  Once         Completed by DAKOTAH PRESTON on 7/7/2023 at  5:17 PM YANELIS KUHN              Virtual Visit Note: The provider triage portion of this emergency department evaluation and documentation was performed via [employer], a HIPAA-compliant telemedicine application, in concert with a tele-presenter in the room. A face to face patient evaluation with one of my colleagues will occur once the patient is placed in an emergency department room.      DISCLAIMER: This note was prepared with [employer] voice recognition transcription software. Garbled syntax, mangled pronouns, and other bizarre constructions may be attributed to that software system.    "

## 2023-07-08 LAB
ALBUMIN SERPL BCP-MCNC: 3.9 G/DL (ref 3.5–5.2)
ALP SERPL-CCNC: 113 U/L (ref 55–135)
ALT SERPL W/O P-5'-P-CCNC: 64 U/L (ref 10–44)
ANION GAP SERPL CALC-SCNC: 11 MMOL/L (ref 8–16)
AST SERPL-CCNC: 70 U/L (ref 10–40)
BASOPHILS # BLD AUTO: 0.05 K/UL (ref 0–0.2)
BASOPHILS NFR BLD: 0.7 % (ref 0–1.9)
BILIRUB SERPL-MCNC: 0.5 MG/DL (ref 0.1–1)
BUN SERPL-MCNC: 23 MG/DL (ref 8–23)
CALCIUM SERPL-MCNC: 9.4 MG/DL (ref 8.7–10.5)
CHLORIDE SERPL-SCNC: 103 MMOL/L (ref 95–110)
CO2 SERPL-SCNC: 29 MMOL/L (ref 23–29)
CREAT SERPL-MCNC: 1.3 MG/DL (ref 0.5–1.4)
CRP SERPL-MCNC: 3.8 MG/L (ref 0–8.2)
DIFFERENTIAL METHOD: ABNORMAL
EOSINOPHIL # BLD AUTO: 0.8 K/UL (ref 0–0.5)
EOSINOPHIL NFR BLD: 10.7 % (ref 0–8)
ERYTHROCYTE [DISTWIDTH] IN BLOOD BY AUTOMATED COUNT: 15.7 % (ref 11.5–14.5)
ERYTHROCYTE [SEDIMENTATION RATE] IN BLOOD BY WESTERGREN METHOD: 32 MM/HR (ref 0–20)
EST. GFR  (NO RACE VARIABLE): 46 ML/MIN/1.73 M^2
GLUCOSE SERPL-MCNC: 108 MG/DL (ref 70–110)
HCT VFR BLD AUTO: 34.6 % (ref 37–48.5)
HGB BLD-MCNC: 10.8 G/DL (ref 12–16)
IMM GRANULOCYTES # BLD AUTO: 0.01 K/UL (ref 0–0.04)
IMM GRANULOCYTES NFR BLD AUTO: 0.1 % (ref 0–0.5)
LIPASE SERPL-CCNC: 60 U/L (ref 4–60)
LYMPHOCYTES # BLD AUTO: 2.2 K/UL (ref 1–4.8)
LYMPHOCYTES NFR BLD: 30.8 % (ref 18–48)
MAGNESIUM SERPL-MCNC: 1.7 MG/DL (ref 1.6–2.6)
MCH RBC QN AUTO: 27.5 PG (ref 27–31)
MCHC RBC AUTO-ENTMCNC: 31.2 G/DL (ref 32–36)
MCV RBC AUTO: 88 FL (ref 82–98)
MONOCYTES # BLD AUTO: 0.6 K/UL (ref 0.3–1)
MONOCYTES NFR BLD: 9 % (ref 4–15)
NEUTROPHILS # BLD AUTO: 3.5 K/UL (ref 1.8–7.7)
NEUTROPHILS NFR BLD: 48.7 % (ref 38–73)
NRBC BLD-RTO: 0 /100 WBC
PHOSPHATE SERPL-MCNC: 3.6 MG/DL (ref 2.7–4.5)
PLATELET # BLD AUTO: 147 K/UL (ref 150–450)
PMV BLD AUTO: 9.9 FL (ref 9.2–12.9)
POCT GLUCOSE: 100 MG/DL (ref 70–110)
POCT GLUCOSE: 114 MG/DL (ref 70–110)
POCT GLUCOSE: 160 MG/DL (ref 70–110)
POCT GLUCOSE: 208 MG/DL (ref 70–110)
POTASSIUM SERPL-SCNC: 4.5 MMOL/L (ref 3.5–5.1)
PROT SERPL-MCNC: 7.4 G/DL (ref 6–8.4)
RBC # BLD AUTO: 3.93 M/UL (ref 4–5.4)
SODIUM SERPL-SCNC: 143 MMOL/L (ref 136–145)
TROPONIN I SERPL DL<=0.01 NG/ML-MCNC: 0.01 NG/ML (ref 0–0.03)
TROPONIN I SERPL DL<=0.01 NG/ML-MCNC: 0.02 NG/ML (ref 0–0.03)
TSH SERPL DL<=0.005 MIU/L-ACNC: 0.86 UIU/ML (ref 0.4–4)
WBC # BLD AUTO: 7.1 K/UL (ref 3.9–12.7)

## 2023-07-08 PROCEDURE — G0378 HOSPITAL OBSERVATION PER HR: HCPCS

## 2023-07-08 PROCEDURE — 25000003 PHARM REV CODE 250: Performed by: NURSE PRACTITIONER

## 2023-07-08 PROCEDURE — 99213 OFFICE O/P EST LOW 20 MIN: CPT | Mod: ,,, | Performed by: INTERNAL MEDICINE

## 2023-07-08 PROCEDURE — 63600175 PHARM REV CODE 636 W HCPCS: Performed by: NURSE PRACTITIONER

## 2023-07-08 PROCEDURE — 84484 ASSAY OF TROPONIN QUANT: CPT | Performed by: NURSE PRACTITIONER

## 2023-07-08 PROCEDURE — 83735 ASSAY OF MAGNESIUM: CPT | Performed by: NURSE PRACTITIONER

## 2023-07-08 PROCEDURE — 99213 PR OFFICE/OUTPT VISIT, EST, LEVL III, 20-29 MIN: ICD-10-PCS | Mod: ,,, | Performed by: INTERNAL MEDICINE

## 2023-07-08 PROCEDURE — 25000003 PHARM REV CODE 250: Performed by: HOSPITALIST

## 2023-07-08 PROCEDURE — 85025 COMPLETE CBC W/AUTO DIFF WBC: CPT | Performed by: NURSE PRACTITIONER

## 2023-07-08 PROCEDURE — 25000242 PHARM REV CODE 250 ALT 637 W/ HCPCS: Performed by: NURSE PRACTITIONER

## 2023-07-08 PROCEDURE — 83690 ASSAY OF LIPASE: CPT | Performed by: HOSPITALIST

## 2023-07-08 PROCEDURE — 96372 THER/PROPH/DIAG INJ SC/IM: CPT | Performed by: NURSE PRACTITIONER

## 2023-07-08 PROCEDURE — 94761 N-INVAS EAR/PLS OXIMETRY MLT: CPT

## 2023-07-08 PROCEDURE — 80053 COMPREHEN METABOLIC PANEL: CPT | Performed by: NURSE PRACTITIONER

## 2023-07-08 PROCEDURE — 36415 COLL VENOUS BLD VENIPUNCTURE: CPT | Performed by: HOSPITALIST

## 2023-07-08 PROCEDURE — 82962 GLUCOSE BLOOD TEST: CPT

## 2023-07-08 PROCEDURE — 63600175 PHARM REV CODE 636 W HCPCS: Mod: GZ | Performed by: NURSE PRACTITIONER

## 2023-07-08 PROCEDURE — 84443 ASSAY THYROID STIM HORMONE: CPT | Performed by: HOSPITALIST

## 2023-07-08 PROCEDURE — 36415 COLL VENOUS BLD VENIPUNCTURE: CPT | Performed by: NURSE PRACTITIONER

## 2023-07-08 PROCEDURE — 86140 C-REACTIVE PROTEIN: CPT | Performed by: HOSPITALIST

## 2023-07-08 PROCEDURE — 84100 ASSAY OF PHOSPHORUS: CPT | Performed by: NURSE PRACTITIONER

## 2023-07-08 PROCEDURE — 85651 RBC SED RATE NONAUTOMATED: CPT | Performed by: HOSPITALIST

## 2023-07-08 RX ORDER — ONDANSETRON 2 MG/ML
4 INJECTION INTRAMUSCULAR; INTRAVENOUS EVERY 6 HOURS PRN
Status: DISCONTINUED | OUTPATIENT
Start: 2023-07-08 | End: 2023-07-11 | Stop reason: HOSPADM

## 2023-07-08 RX ORDER — CLOPIDOGREL BISULFATE 75 MG/1
75 TABLET ORAL DAILY
Status: DISCONTINUED | OUTPATIENT
Start: 2023-07-08 | End: 2023-07-11 | Stop reason: HOSPADM

## 2023-07-08 RX ORDER — TACROLIMUS 1 MG/1
1 CAPSULE ORAL 2 TIMES DAILY
Status: DISCONTINUED | OUTPATIENT
Start: 2023-07-08 | End: 2023-07-11 | Stop reason: HOSPADM

## 2023-07-08 RX ORDER — GLUCAGON 1 MG
1 KIT INJECTION
Status: DISCONTINUED | OUTPATIENT
Start: 2023-07-08 | End: 2023-07-11 | Stop reason: HOSPADM

## 2023-07-08 RX ORDER — ACETAMINOPHEN 325 MG/1
650 TABLET ORAL EVERY 8 HOURS PRN
Status: DISCONTINUED | OUTPATIENT
Start: 2023-07-08 | End: 2023-07-10

## 2023-07-08 RX ORDER — ASPIRIN 81 MG/1
81 TABLET ORAL DAILY
Status: DISCONTINUED | OUTPATIENT
Start: 2023-07-08 | End: 2023-07-11 | Stop reason: HOSPADM

## 2023-07-08 RX ORDER — IBUPROFEN 200 MG
16 TABLET ORAL
Status: DISCONTINUED | OUTPATIENT
Start: 2023-07-08 | End: 2023-07-11 | Stop reason: HOSPADM

## 2023-07-08 RX ORDER — NITROGLYCERIN 0.4 MG/1
0.4 TABLET SUBLINGUAL EVERY 5 MIN PRN
Status: DISCONTINUED | OUTPATIENT
Start: 2023-07-08 | End: 2023-07-11 | Stop reason: HOSPADM

## 2023-07-08 RX ORDER — LEVOTHYROXINE SODIUM 25 UG/1
75 TABLET ORAL DAILY
Status: DISCONTINUED | OUTPATIENT
Start: 2023-07-08 | End: 2023-07-11 | Stop reason: HOSPADM

## 2023-07-08 RX ORDER — NALOXONE HCL 0.4 MG/ML
0.02 VIAL (ML) INJECTION
Status: DISCONTINUED | OUTPATIENT
Start: 2023-07-08 | End: 2023-07-11 | Stop reason: HOSPADM

## 2023-07-08 RX ORDER — PROCHLORPERAZINE EDISYLATE 5 MG/ML
5 INJECTION INTRAMUSCULAR; INTRAVENOUS EVERY 6 HOURS PRN
Status: DISCONTINUED | OUTPATIENT
Start: 2023-07-08 | End: 2023-07-11 | Stop reason: HOSPADM

## 2023-07-08 RX ORDER — LOSARTAN POTASSIUM 25 MG/1
50 TABLET ORAL DAILY
Status: DISCONTINUED | OUTPATIENT
Start: 2023-07-08 | End: 2023-07-09

## 2023-07-08 RX ORDER — TALC
6 POWDER (GRAM) TOPICAL NIGHTLY PRN
Status: DISCONTINUED | OUTPATIENT
Start: 2023-07-08 | End: 2023-07-11 | Stop reason: HOSPADM

## 2023-07-08 RX ORDER — LORAZEPAM 1 MG/1
1 TABLET ORAL NIGHTLY PRN
Status: DISCONTINUED | OUTPATIENT
Start: 2023-07-08 | End: 2023-07-11 | Stop reason: HOSPADM

## 2023-07-08 RX ORDER — SIMETHICONE 80 MG
1 TABLET,CHEWABLE ORAL 4 TIMES DAILY PRN
Status: DISCONTINUED | OUTPATIENT
Start: 2023-07-08 | End: 2023-07-11 | Stop reason: HOSPADM

## 2023-07-08 RX ORDER — INSULIN ASPART 100 [IU]/ML
1-10 INJECTION, SOLUTION INTRAVENOUS; SUBCUTANEOUS
Status: DISCONTINUED | OUTPATIENT
Start: 2023-07-08 | End: 2023-07-11 | Stop reason: HOSPADM

## 2023-07-08 RX ORDER — VILAZODONE HYDROCHLORIDE 40 MG/1
40 TABLET ORAL DAILY
Status: DISCONTINUED | OUTPATIENT
Start: 2023-07-08 | End: 2023-07-11 | Stop reason: HOSPADM

## 2023-07-08 RX ORDER — METOPROLOL TARTRATE 25 MG/1
12.5 TABLET ORAL 2 TIMES DAILY
Status: DISCONTINUED | OUTPATIENT
Start: 2023-07-08 | End: 2023-07-11 | Stop reason: HOSPADM

## 2023-07-08 RX ORDER — MAG HYDROX/ALUMINUM HYD/SIMETH 200-200-20
30 SUSPENSION, ORAL (FINAL DOSE FORM) ORAL 4 TIMES DAILY PRN
Status: DISCONTINUED | OUTPATIENT
Start: 2023-07-08 | End: 2023-07-11 | Stop reason: HOSPADM

## 2023-07-08 RX ORDER — FAMOTIDINE 20 MG/1
40 TABLET, FILM COATED ORAL NIGHTLY
Status: DISCONTINUED | OUTPATIENT
Start: 2023-07-08 | End: 2023-07-08

## 2023-07-08 RX ORDER — ISOSORBIDE MONONITRATE 30 MG/1
30 TABLET, EXTENDED RELEASE ORAL DAILY
Status: DISCONTINUED | OUTPATIENT
Start: 2023-07-08 | End: 2023-07-11 | Stop reason: HOSPADM

## 2023-07-08 RX ORDER — FAMOTIDINE 20 MG/1
40 TABLET, FILM COATED ORAL DAILY
Status: DISCONTINUED | OUTPATIENT
Start: 2023-07-08 | End: 2023-07-11 | Stop reason: HOSPADM

## 2023-07-08 RX ORDER — SODIUM CHLORIDE 0.9 % (FLUSH) 0.9 %
10 SYRINGE (ML) INJECTION EVERY 8 HOURS PRN
Status: DISCONTINUED | OUTPATIENT
Start: 2023-07-08 | End: 2023-07-11 | Stop reason: HOSPADM

## 2023-07-08 RX ORDER — IBUPROFEN 200 MG
24 TABLET ORAL
Status: DISCONTINUED | OUTPATIENT
Start: 2023-07-08 | End: 2023-07-11 | Stop reason: HOSPADM

## 2023-07-08 RX ORDER — AMOXICILLIN 250 MG
1 CAPSULE ORAL 2 TIMES DAILY
Status: DISCONTINUED | OUTPATIENT
Start: 2023-07-08 | End: 2023-07-11 | Stop reason: HOSPADM

## 2023-07-08 RX ADMIN — INSULIN ASPART 4 UNITS: 100 INJECTION, SOLUTION INTRAVENOUS; SUBCUTANEOUS at 04:07

## 2023-07-08 RX ADMIN — ISOSORBIDE MONONITRATE 30 MG: 30 TABLET, EXTENDED RELEASE ORAL at 08:07

## 2023-07-08 RX ADMIN — ACETAMINOPHEN 650 MG: 325 TABLET ORAL at 09:07

## 2023-07-08 RX ADMIN — NITROGLYCERIN 0.4 MG: 0.4 TABLET, ORALLY DISINTEGRATING SUBLINGUAL at 07:07

## 2023-07-08 RX ADMIN — ASPIRIN 81 MG: 81 TABLET, COATED ORAL at 04:07

## 2023-07-08 RX ADMIN — SENNOSIDES AND DOCUSATE SODIUM 1 TABLET: 50; 8.6 TABLET ORAL at 08:07

## 2023-07-08 RX ADMIN — LOSARTAN POTASSIUM 50 MG: 25 TABLET, FILM COATED ORAL at 08:07

## 2023-07-08 RX ADMIN — FAMOTIDINE 40 MG: 20 TABLET ORAL at 08:07

## 2023-07-08 RX ADMIN — METOPROLOL TARTRATE 12.5 MG: 25 TABLET, FILM COATED ORAL at 08:07

## 2023-07-08 RX ADMIN — TACROLIMUS 1 MG: 1 CAPSULE ORAL at 04:07

## 2023-07-08 RX ADMIN — SENNOSIDES AND DOCUSATE SODIUM 1 TABLET: 50; 8.6 TABLET ORAL at 09:07

## 2023-07-08 RX ADMIN — CLOPIDOGREL BISULFATE 75 MG: 75 TABLET, FILM COATED ORAL at 08:07

## 2023-07-08 RX ADMIN — LEVOTHYROXINE SODIUM 75 MCG: 0.03 TABLET ORAL at 08:07

## 2023-07-08 RX ADMIN — TACROLIMUS 1 MG: 1 CAPSULE ORAL at 08:07

## 2023-07-08 RX ADMIN — METOPROLOL TARTRATE 12.5 MG: 25 TABLET, FILM COATED ORAL at 09:07

## 2023-07-08 NOTE — ASSESSMENT & PLAN NOTE
Patient's anemia is currently controlled. Has not received any PRBCs to date.. Etiology likely d/t chronic disease  Current CBC reviewed-   Lab Results   Component Value Date    HGB 10.8 (L) 07/08/2023    HCT 34.6 (L) 07/08/2023     Monitor serial CBC and transfuse if patient becomes hemodynamically unstable, symptomatic or H/H drops below 7/21.

## 2023-07-08 NOTE — ED NOTES
Attempted to call report to Idris but RN unavailable at this time for report and will call me back.

## 2023-07-08 NOTE — PLAN OF CARE
[facility] - Med/Surg  Initial Discharge Assessment       Primary Care Provider: Fady Vaughn MD    Admission Diagnosis: Chest pain [R07.9]    Admission Date: 7/7/2023  Expected Discharge Date:       DC assessment completed with pt and family at bedside. Verified info on facesheet as correct. Pt lives at listed address with Any. PCP Roderick. Pharmacy [facility]. DME power chair, dexcom. Denies hd/hh. Pt does not take coumadin. Pt without recent admission. Family to provide ride home. CM to follow for discharge needs.      Transition of Care Barriers: None    Payor: MEDICAID / Plan: MEDICAID [place] / Product Type: Government /     Extended Emergency Contact Information  Primary Emergency Contact: JanaypatriciaAny  Address: [address] [place] [address]  Home Phone: [phone]  Mobile Phone: [phone]  Relation: Friend  Preferred language: English   needed? No    Discharge Plan A: Home with family  Discharge Plan B: Home      [facility] [address] [place] [facility] [address] [facility] [address]  [address]  [facility] [address]  Phone: [phone] Fax: [phone]      Initial Assessment (most recent)       Adult Discharge Assessment - 07/08/23 1454          Discharge Assessment    Assessment Type Discharge Planning Assessment     Confirmed/corrected address, phone number and insurance Yes     Confirmed Demographics Correct on Facesheet     Source of Information patient;family     Does patient/caregiver understand observation status Yes     People in Home significant other     Do you expect to return to your current living situation? Yes     Prior to hospitilization cognitive status: Alert/Oriented     Current cognitive status: Alert/Oriented     Walking or Climbing Stairs ambulation difficulty, requires equipment     Equipment Currently Used at Home glucometer;power chair     Readmission within 30 days? No     Patient currently being followed by  outpatient case management? No     Do you currently have service(s) that help you manage your care at home? No     Do you take prescription medications? Yes     Do you have prescription coverage? Yes     Do you have any problems affording any of your prescribed medications? No     Is the patient taking medications as prescribed? yes     How do you get to doctors appointments? family or friend will provide     Are you on dialysis? No     Do you take coumadin? No     Discharge Plan A Home with family     Discharge Plan B Home     DME Needed Upon Discharge  none     Discharge Plan discussed with: Patient;Spouse/sig other     Transition of Care Barriers None

## 2023-07-08 NOTE — ASSESSMENT & PLAN NOTE
Patient's anemia is currently controlled. Has not received any PRBCs to date.. Etiology likely d/t chronic disease  Current CBC reviewed-   Lab Results   Component Value Date    HGB 11.7 (L) 07/07/2023    HCT 38.1 07/07/2023     Monitor serial CBC and transfuse if patient becomes hemodynamically unstable, symptomatic or H/H drops below 7/21.

## 2023-07-08 NOTE — ASSESSMENT & PLAN NOTE
Chronic, controlled.  Latest blood pressure and vitals reviewed-     Temp:  [98.3 °F (36.8 °C)]   Pulse:  [69-86]   Resp:  [18-20]   BP: (146-234)/()   SpO2:  [94 %-97 %] .   Home meds for hypertension were reviewed.    While in the hospital, will manage blood pressure as follows; Continue home antihypertensive regimen    Will utilize p.r.n. blood pressure medication only if patient's blood pressure greater than 180/110 and she develops symptoms such as worsening chest pain or shortness of breath.

## 2023-07-08 NOTE — NURSING
Arrives to room 218 AAOX4 VSS afebrile Respirations even and unlabored Skin warm and dry denies pain moves all extremities well family present at bedside tele monitored box 5917 NSR rate 60

## 2023-07-08 NOTE — SUBJECTIVE & OBJECTIVE
"Past Medical History:   Diagnosis Date    Anemia     Anemia in chronic kidney disease, on chronic dialysis 7/12/2017    Anxiety and depression     Aplastic anemia with parvovirus B19 infection 5/27/2019    Aplastic anemia with parvovirus B19 infection 4/2019 5/27/2019    Arthritis     Asthma     allergic airway    CKD stage III (moderate) 2/18/2019    Colon polyp     Depression     Diabetes mellitus     Diverticulosis     Encounter for blood transfusion     Eosinophilia     ESRD (end stage renal disease)     stage V, due for living donor 9/2018    ESRD on dialysis     GERD (gastroesophageal reflux disease)     Gout     Gout     Hyperlipidemia     Hypertension     Hypertriglyceridemia without hypercholesterolemia 6/9/2019    Low back pain     Low HDL (under 40) 6/9/2019    MRSA carrier     Neutropenia, unspecified 5/8/2019    Obesity     Renal disorder     Rotator cuff syndrome--left     Thyroid disease     Ulnar neuropathy of right upper extremity     Uncontrolled type 2 diabetes mellitus with hyperglycemia        Past Surgical History:   Procedure Laterality Date    ACHILLES TENDON SURGERY Left 05/2015    BACK SURGERY      CERVICAL SPINE SURGERY  2021    cervical gate placed by Dr. Vera- screws,plate, and "gate"    CHOLECYSTECTOMY      COLONOSCOPY N/A 09/06/2017    Procedure: COLONOSCOPY;  Surgeon: Marisol Bryson MD;  Location: [facility] ENDO;  Service: Endoscopy;  Laterality: N/A; REPEAT IN 3 YEARS FOR SURVEILLANCE    COLONOSCOPY N/A 05/09/2019    Procedure: COLONOSCOPY;  Surgeon: Fady Ewing MD;  Location: [facility] ENDO (2ND FLR);  Service: Endoscopy;  Laterality: N/A;    CORONARY ANGIOGRAPHY  08/31/2022    Procedure: ANGIOGRAM, CORONARY ARTERY;  Surgeon: Christoph Dang MD;  Location: [facility] CATH;  Service: Cardiology;;    DIALYSIS FISTULA CREATION  12/2017    ESOPHAGOGASTRODUODENOSCOPY N/A 05/09/2019    Procedure: EGD (ESOPHAGOGASTRODUODENOSCOPY);  Surgeon: Fady Ewing MD;  Location: [facility] ENDO (2ND FLR);  " Service: Endoscopy;  Laterality: N/A;    ESOPHAGOGASTRODUODENOSCOPY N/A 06/10/2021    Procedure: EGD (ESOPHAGOGASTRODUODENOSCOPY);  Surgeon: Marisol Bryson MD;  Location: [facility] ENDO;  Service: Endoscopy;  Laterality: N/A;    ESOPHAGOGASTRODUODENOSCOPY N/A 5/31/2023    Procedure: EGD (ESOPHAGOGASTRODUODENOSCOPY);  Surgeon: Marisol Bryson MD;  Location: [facility] ENDO;  Service: Endoscopy;  Laterality: N/A;    FRACTURE SURGERY  1990    Ankle knee    HEMORRHOID SURGERY      INJECTION OF ANESTHETIC AGENT AROUND MEDIAL BRANCH NERVES INNERVATING LUMBAR FACET JOINT Right 09/25/2019    Procedure: Block-nerve-medial branch-lumbar;  Surgeon: Yonny Ferrer MD;  Location: [facility] OR;  Service: Pain Management;  Laterality: Right;  L2, 3, 4,5     INJECTION OF ANESTHETIC AGENT AROUND MEDIAL BRANCH NERVES INNERVATING LUMBAR FACET JOINT Right 10/16/2019    Procedure: Block-nerve-medial branch-lumbar;  Surgeon: Yonny Ferrer MD;  Location: [facility] OR;  Service: Pain Management;  Laterality: Right;  L2, 3, 4,5     JOINT REPLACEMENT      left    KIDNEY TRANSPLANT N/A 01/14/2019    Procedure: TRANSPLANT, KIDNEY;  Surgeon: Roland Salazar MD;  Location: [facility] OR [address] [place] [address];  Service: Transplant;  Laterality: N/A;    KNEE SURGERY      RADIOFREQUENCY ABLATION OF LUMBAR MEDIAL BRANCH NERVE AT SINGLE LEVEL Right 10/29/2019    Procedure: Radiofrequency Ablation, Nerve, Spinal, Lumbar, Medial Branch, 1 Level;  Surgeon: Yonny Ferrer MD;  Location: [facility] OR;  Service: Pain Management;  Laterality: Right;  L2, 3, 4, 5  burned at 80 degrees C X 60 seconds each site X 2    SHOULDER ARTHROSCOPY      SHOULDER SURGERY      SPINE SURGERY  2020    Pain pump in back    UPPER GASTROINTESTINAL ENDOSCOPY  ~2013    Dr. Moralez       Review of patient's allergies indicates:   Allergen Reactions    Dilaudid [hydromorphone] Itching    Morphine Itching    Torsemide Diarrhea     Diarrhea stopped once discontinued med    Codeine Itching     Can take oxycodone and hydrocodone  "with Benadryl       No current facility-administered medications on file prior to encounter.     Current Outpatient Medications on File Prior to Encounter   Medication Sig    acetaminophen (TYLENOL) 325 MG tablet Take 2 tablets (650 mg total) by mouth every 8 (eight) hours as needed for Pain.    amLODIPine (NORVASC) 5 MG tablet Take 1 tablet by mouth once daily.    aspirin (ECOTRIN) 81 MG EC tablet Take 1 tablet (81 mg total) by mouth once daily.    BD LENIN 2ND GEN PEN NEEDLE 32 gauge x 5/32" Ndle Use up to four daily to inject novolog (Patient not taking: Reported on 6/26/2023)    blood sugar diagnostic Strp Check glucose 3 (three) times daily. (Patient not taking: Reported on 6/26/2023)    blood-glucose sensor (DEXCOM G7 SENSOR) Amparo Change every 10 days (Patient not taking: Reported on 6/26/2023)    clopidogreL (PLAVIX) 75 mg tablet TAKE ONE TABLET BY MOUTH DAILY    colchicine (COLCRYS) 0.6 mg tablet Take 1 tablet (0.6 mg total) by mouth 2 (two) times daily. (Patient not taking: Reported on 6/26/2023)    DEXCOM G7  Misc Dispense 1 (Patient not taking: Reported on 6/26/2023)    doxazosin (CARDURA) 2 MG tablet Take 2 mg by mouth once daily.    DULoxetine (CYMBALTA) 30 MG capsule Take 30 mg by mouth once daily.    empagliflozin (JARDIANCE) 10 mg tablet Take 1 tablet (10 mg total) by mouth once daily.    famotidine (PEPCID) 40 MG tablet Take 1 tablet (40 mg total) by mouth every evening.    flash glucose sensor (FREESTYLE DAKOTAH 14 DAY SENSOR) Kit Use 1 sensor every 14 days to check blood glucose (Patient not taking: Reported on 6/26/2023)    insulin aspart U-100 (NOVOLOG FLEXPEN U-100 INSULIN) 100 unit/mL (3 mL) InPn pen 32 u breakfast and supper, 28 u lunch + correction. Max TDD 140u    insulin degludec (TRESIBA FLEXTOUCH U-200) 200 unit/mL (3 mL) insulin pen Inject 76 Units into the skin once daily.    irbesartan (AVAPRO) 150 MG tablet Take 1 tablet by mouth once daily.    isosorbide mononitrate (IMDUR) 30 " MG 24 hr tablet TAKE ONE TABLET BY MOUTH DAILY    lancets (ONETOUCH ULTRASOFT LANCETS) Misc Use to test blood sugar 3 x's a day (Patient not taking: Reported on 6/26/2023)    lansoprazole (PREVACID) 30 MG capsule Take 1 capsule (30 mg total) by mouth 2 (two) times a day.    levothyroxine (SYNTHROID) 75 MCG tablet TAKE ONE TABLET BY MOUTH ONCE DAILY    LIDOcaine (LIDODERM) 5 % 1 patch.    LORazepam (ATIVAN) 1 MG tablet Take 1 mg by mouth every evening.    losartan (COZAAR) 50 MG tablet Take 1 tablet (50 mg total) by mouth once daily.    magnesium oxide 500 mg Tab Take 500 mg by mouth once daily.    metFORMIN (GLUCOPHAGE-XR) 500 MG ER 24hr tablet Take 1 tablet (500 mg total) by mouth 2 (two) times daily with meals.    metoprolol tartrate (LOPRESSOR) 25 MG tablet Take 0.5 tablets (12.5 mg total) by mouth 2 (two) times daily.    multivitamin (THERAGRAN) tablet Take 1 tablet by mouth once daily.    nitroGLYCERIN (NITROSTAT) 0.4 MG SL tablet Place 1 tablet (0.4 mg total) under the tongue every 5 (five) minutes as needed for Chest pain. (Patient not taking: Reported on 6/26/2023)    ondansetron (ZOFRAN) 4 MG tablet Take 1 tablet (4 mg total) by mouth every 6 (six) hours. (Patient not taking: Reported on 6/26/2023)    sucralfate (CARAFATE) 1 gram tablet Take 1 tablet (1 g total) by mouth every 6 (six) hours as needed (GERD).    tacrolimus (PROGRAF) 1 MG Cap Take 1 capsule (1 mg total) by mouth every 12 (twelve) hours.    tiZANidine (ZANAFLEX) 4 MG tablet Take 1 tablet by mouth every evening.    vilazodone (VIIBRYD) 40 mg Tab tablet Take 1 tablet (40 mg total) by mouth once daily.    VRAYLAR 3 mg Cap Take 3 mg by mouth once daily.     Family History       Problem Relation (Age of Onset)    Alzheimer's disease Mother    Arthritis Mother    COPD Maternal Aunt    Depression Mother    Diabetes Mother, Sister, Maternal Grandmother, Maternal Aunt    Heart disease Mother, Father, Brother, Maternal Aunt    Hyperlipidemia Mother     Hypertension Maternal Grandmother, Maternal Aunt    Kidney disease Maternal Grandmother    Lupus Sister    Mental illness Sister    No Known Problems Son, Paternal Grandmother, Paternal Grandfather, Maternal Uncle, Paternal Aunt, Paternal Uncle    Ovarian cancer Sister    Stroke Father    Thyroid disease Mother          Tobacco Use    Smoking status: Never    Smokeless tobacco: Never   Substance and Sexual Activity    Alcohol use: No     Alcohol/week: 0.0 standard drinks    Drug use: No    Sexual activity: Yes     Partners: Female     Review of Systems   Constitutional:  Negative for activity change, appetite change, chills and fever.   HENT:  Negative for congestion, sore throat and trouble swallowing.    Eyes:  Negative for photophobia and visual disturbance.   Respiratory:  Negative for cough, chest tightness and shortness of breath.    Cardiovascular:  Positive for chest pain. Negative for palpitations and leg swelling.   Gastrointestinal:  Positive for nausea and vomiting. Negative for abdominal pain and diarrhea.   Genitourinary:  Negative for dysuria, flank pain and hematuria.   Musculoskeletal:  Negative for back pain.   Neurological:  Negative for dizziness, weakness and headaches.   Psychiatric/Behavioral:  Negative for confusion.    Objective:     Vital Signs (Most Recent):  Temp: 98.3 °F (36.8 °C) (07/07/23 1727)  Pulse: 74 (07/08/23 0102)  Resp: 18 (07/07/23 2257)  BP: (!) 162/75 (07/08/23 0102)  SpO2: 95 % (07/08/23 0102) Vital Signs (24h Range):  Temp:  [98.3 °F (36.8 °C)] 98.3 °F (36.8 °C)  Pulse:  [69-86] 74  Resp:  [18-20] 18  SpO2:  [94 %-97 %] 95 %  BP: (146-234)/() 162/75     Weight: 90.7 kg (200 lb)  Body mass index is 31.32 kg/m².     Physical Exam  Vitals reviewed.   Constitutional:       Appearance: Normal appearance. She is obese.   HENT:      Head: Normocephalic.      Mouth/Throat:      Mouth: Mucous membranes are moist.      Pharynx: Oropharynx is clear.   Eyes:      Pupils: Pupils  are equal, round, and reactive to light.   Cardiovascular:      Rate and Rhythm: Normal rate and regular rhythm.      Pulses: Normal pulses.      Heart sounds: Murmur heard.   Pulmonary:      Effort: Pulmonary effort is normal.      Breath sounds: Normal breath sounds.   Chest:      Chest wall: Tenderness present.   Abdominal:      General: Bowel sounds are normal.      Palpations: Abdomen is soft.   Musculoskeletal:         General: Normal range of motion.      Cervical back: Normal range of motion and neck supple.   Skin:     General: Skin is warm and dry.   Neurological:      Mental Status: She is alert and oriented to person, place, and time. Mental status is at baseline.   Psychiatric:         Mood and Affect: Mood normal.         Speech: Speech normal.         Behavior: Behavior normal.            CRANIAL NERVES     CN III, IV, VI   Pupils are equal, round, and reactive to light.     Significant Labs: All pertinent labs within the past 24 hours have been reviewed.  CBC:   Recent Labs   Lab 07/07/23 2015   WBC 8.94   HGB 11.7*   HCT 38.1        CMP:   Recent Labs   Lab 07/07/23 2015      K 4.9      CO2 28      BUN 24*   CREATININE 1.4   CALCIUM 10.2   PROT 8.2   ALBUMIN 4.3   BILITOT 0.6   ALKPHOS 128   AST 93*   ALT 75*   ANIONGAP 14       Significant Imaging: I have reviewed all pertinent imaging results/findings within the past 24 hours.\    Imaging Results              X-Ray Chest AP Portable (Final result)  Result time 07/07/23 19:50:02      Final result by Hien Malik MD (07/07/23 19:50:02)                   Impression:      No acute or significant focal abnormality involving the chest.      Electronically signed by: Hien Malik  Date:    07/07/2023  Time:    19:50               Narrative:    EXAMINATION:  XR CHEST AP PORTABLE    CLINICAL HISTORY:  Chest Pain;    TECHNIQUE:  Single frontal view of the chest was performed.    COMPARISON:  06/19/2023, 04/10/2023,  09/13/2022    FINDINGS:  Cardiomediastinal silhouette is stable and not significantly enlarged.  There is no evidence of acute lung parenchymal or pleural abnormality.  Degenerative changes of the spine and shoulders as well as postoperative changes of the cervical spine with hardware again noted.

## 2023-07-08 NOTE — ED NOTES
Patient has an old AV fistula to left upper arm that has not been used in 4 years before patient kidney transplant. She also has a fentanyl pump embedded in her back.

## 2023-07-08 NOTE — HPI
Andra Newell is a 63-year-old female with a past medical history of anemia, arthritis, DM type 2, depression, CAD S/P stents (08/2022), and S/P renal transplant (2019) who presents with chest pain and associated nausea and vomiting the past week.  Patient was seen in previous ER on 06/19/2023 with similar symptoms and workup was negative.  She was followed up with her cardiologist, , on 6/21/23 and scheduled for outpatient cardiac PET. Patient's most recent stress and echo were performed 04/2023.  Patient states she has been experiencing intermittent chest pain for 1 week and describes it as a heaviness.  Patient states she takes nitroglycerin daily and it will provide relief for a few minutes.  EKG showed normal sinus rhythm with no ST elevation or depression, troponin 0.007, AST 93, ALT 75, eGFR 42 (38.5 last week) and hemoglobin 11.7.  Chest x-ray showed no acute abnormality.  Patient was given nitro paste in ED with some relief.  Patient referred to Hospital Medicine and seen in the ED.  Patient reports chest pain continues to occur intermittently with no precipitating factors.  Patient also reports chest tenderness and states this is not new.  Patient reports vomiting earlier today and denies any further episodes.  Patient denies shortness of breath, nausea, diarrhea, fever and chills.  Patient will be admitted to Hospital Medicine for further evaluation and management.

## 2023-07-08 NOTE — ED PROVIDER NOTES
"Encounter Date: 7/7/2023       History     Chief Complaint   Patient presents with    Chest Pain     S&S x1 week.  Chest pain, N&V. Pt has stent placed, has been taking one nitroglycerin daily "with only a little relief for 5 minutes"  was seen and discharged from [facility] when S&S started 1 week ago.     This patient has known history of coronary artery disease with a recent stent approximately 1 week ago with a additional coronary vessel with 60% occlusion.  This patient presents with crescendo type of anginal symptomatology that has been going on now for but the last 3 or 4 days and progressively getting worse.  Patient denies fatigue at rest and shortness of breath at rest.  She denies any other complaints of at the present time.  Especially no reported fever.    The history is provided by the patient.   Review of patient's allergies indicates:   Allergen Reactions    Dilaudid [hydromorphone] Itching    Morphine Itching    Torsemide Diarrhea     Diarrhea stopped once discontinued med    Codeine Itching     Can take oxycodone and hydrocodone with Benadryl     Past Medical History:   Diagnosis Date    Anemia     Anemia in chronic kidney disease, on chronic dialysis 7/12/2017    Anxiety and depression     Aplastic anemia with parvovirus B19 infection 5/27/2019    Aplastic anemia with parvovirus B19 infection 4/2019 5/27/2019    Arthritis     Asthma     allergic airway    CKD stage III (moderate) 2/18/2019    Colon polyp     Depression     Diabetes mellitus     Diverticulosis     Encounter for blood transfusion     Eosinophilia     ESRD (end stage renal disease)     stage V, due for living donor 9/2018    ESRD on dialysis     GERD (gastroesophageal reflux disease)     Gout     Gout     Hyperlipidemia     Hypertension     Hypertriglyceridemia without hypercholesterolemia 6/9/2019    Low back pain     Low HDL (under 40) 6/9/2019    MRSA carrier     Neutropenia, unspecified 5/8/2019    Obesity     Renal disorder  " "   Rotator cuff syndrome--left     Thyroid disease     Ulnar neuropathy of right upper extremity     Uncontrolled type 2 diabetes mellitus with hyperglycemia      Past Surgical History:   Procedure Laterality Date    ACHILLES TENDON SURGERY Left 05/2015    BACK SURGERY      CERVICAL SPINE SURGERY  2021    cervical gate placed by Dr. Vera- valeria,asim, and "gate"    CHOLECYSTECTOMY      COLONOSCOPY N/A 09/06/2017    Procedure: COLONOSCOPY;  Surgeon: Marisol Bryson MD;  Location: [facility] ENDO;  Service: Endoscopy;  Laterality: N/A; REPEAT IN 3 YEARS FOR SURVEILLANCE    COLONOSCOPY N/A 05/09/2019    Procedure: COLONOSCOPY;  Surgeon: Fady Ewing MD;  Location: [facility] ENDO (2ND FLR);  Service: Endoscopy;  Laterality: N/A;    CORONARY ANGIOGRAPHY  08/31/2022    Procedure: ANGIOGRAM, CORONARY ARTERY;  Surgeon: Christoph Dang MD;  Location: [facility] CATH;  Service: Cardiology;;    DIALYSIS FISTULA CREATION  12/2017    ESOPHAGOGASTRODUODENOSCOPY N/A 05/09/2019    Procedure: EGD (ESOPHAGOGASTRODUODENOSCOPY);  Surgeon: Fady Ewing MD;  Location: [facility] ENDO (2ND FLR);  Service: Endoscopy;  Laterality: N/A;    ESOPHAGOGASTRODUODENOSCOPY N/A 06/10/2021    Procedure: EGD (ESOPHAGOGASTRODUODENOSCOPY);  Surgeon: Marisol Bryson MD;  Location: [facility] ENDO;  Service: Endoscopy;  Laterality: N/A;    ESOPHAGOGASTRODUODENOSCOPY N/A 5/31/2023    Procedure: EGD (ESOPHAGOGASTRODUODENOSCOPY);  Surgeon: Marisol Bryson MD;  Location: [facility] ENDO;  Service: Endoscopy;  Laterality: N/A;    FRACTURE SURGERY  1990    Ankle knee    HEMORRHOID SURGERY      INJECTION OF ANESTHETIC AGENT AROUND MEDIAL BRANCH NERVES INNERVATING LUMBAR FACET JOINT Right 09/25/2019    Procedure: Block-nerve-medial branch-lumbar;  Surgeon: Yonny Ferrer MD;  Location: [facility] OR;  Service: Pain Management;  Laterality: Right;  L2, 3, 4,5     INJECTION OF ANESTHETIC AGENT AROUND MEDIAL BRANCH NERVES INNERVATING LUMBAR FACET JOINT Right 10/16/2019    Procedure: " Block-nerve-medial branch-lumbar;  Surgeon: Yonny Ferrer MD;  Location: [facility] OR;  Service: Pain Management;  Laterality: Right;  L2, 3, 4,5     JOINT REPLACEMENT      left    KIDNEY TRANSPLANT N/A 01/14/2019    Procedure: TRANSPLANT, KIDNEY;  Surgeon: Roland Salazar MD;  Location: [facility] OR 2ND FLR;  Service: Transplant;  Laterality: N/A;    KNEE SURGERY      RADIOFREQUENCY ABLATION OF LUMBAR MEDIAL BRANCH NERVE AT SINGLE LEVEL Right 10/29/2019    Procedure: Radiofrequency Ablation, Nerve, Spinal, Lumbar, Medial Branch, 1 Level;  Surgeon: Yonny Ferrer MD;  Location: [facility] OR;  Service: Pain Management;  Laterality: Right;  L2, 3, 4, 5  burned at 80 degrees C X 60 seconds each site X 2    SHOULDER ARTHROSCOPY      SHOULDER SURGERY      SPINE SURGERY  2020    Pain pump in back    UPPER GASTROINTESTINAL ENDOSCOPY  ~2013     [facility]     Family History   Problem Relation Age of Onset    Diabetes Mother     Alzheimer's disease Mother     Thyroid disease Mother     Hyperlipidemia Mother     Arthritis Mother     Depression Mother     Heart disease Mother     Heart disease Father     Stroke Father     Diabetes Sister     Lupus Sister     Ovarian cancer Sister     Mental illness Sister     Heart disease Brother     No Known Problems Son     Diabetes Maternal Grandmother     Hypertension Maternal Grandmother     Kidney disease Maternal Grandmother     No Known Problems Paternal Grandmother     No Known Problems Paternal Grandfather     COPD Maternal Aunt     Diabetes Maternal Aunt     Heart disease Maternal Aunt     Hypertension Maternal Aunt     No Known Problems Maternal Uncle     No Known Problems Paternal Aunt     No Known Problems Paternal Uncle     Colon cancer Neg Hx     Colon polyps Neg Hx     Crohn's disease Neg Hx     Ulcerative colitis Neg Hx     Celiac disease Neg Hx      Social History     Tobacco Use    Smoking status: Never    Smokeless tobacco: Never   Substance Use Topics    Alcohol use: No     Alcohol/week: 0.0  standard drinks    Drug use: No     Review of Systems   Constitutional: Negative.    HENT: Negative.     Eyes: Negative.    Respiratory:  Positive for chest tightness and shortness of breath.    Cardiovascular:  Positive for chest pain.   Gastrointestinal: Negative.    Endocrine: Negative.    Genitourinary: Negative.    Musculoskeletal: Negative.    Skin: Negative.    Allergic/Immunologic: Negative.    Neurological: Negative.    Hematological: Negative.    Psychiatric/Behavioral: Negative.     All other systems reviewed and are negative.    Physical Exam     Initial Vitals [07/07/23 1727]   BP Pulse Resp Temp SpO2   (!) 146/69 86 20 98.3 °F (36.8 °C) 96 %      MAP       --         Physical Exam    Nursing note and vitals reviewed.  Constitutional: Vital signs are normal. She appears well-developed. She is Obese . She is active and cooperative.   HENT:   Head: Normocephalic and atraumatic.   Eyes: Conjunctivae, EOM and lids are normal. Pupils are equal, round, and reactive to light.   Neck: Trachea normal and phonation normal. Neck supple. No thyroid mass present. Carotid bruit is not present. Normal carotid pulses, no hepatojugular reflux and no JVD present.   Normal range of motion.   Full passive range of motion without pain.     Cardiovascular:  Normal rate, regular rhythm, S1 normal, S2 normal, normal heart sounds, intact distal pulses and normal pulses.           Pulmonary/Chest: Effort normal and breath sounds normal.   Abdominal: Abdomen is soft and protuberant. Bowel sounds are normal. There is no abdominal tenderness.   Musculoskeletal:         General: Normal range of motion.      Cervical back: Full passive range of motion without pain, normal range of motion and neck supple.     Lymphadenopathy:     She has no axillary adenopathy.   Neurological: She is alert and oriented to person, place, and time. She has normal strength. No cranial nerve deficit or sensory deficit. GCS eye subscore is 4. GCS verbal  subscore is 5. GCS motor subscore is 6.   Skin: Skin is warm, dry and intact.   Psychiatric: She has a normal mood and affect. Her speech is normal and behavior is normal. Judgment and thought content normal. Cognition and memory are normal.       ED Course   Critical Care    Date/Time: 7/7/2023 5:05 PM  Performed by: Dante Shafer MD  Authorized by: Dante Shafer MD   Direct patient critical care time: 11 minutes  Additional history critical care time: 11 minutes  Ordering / reviewing critical care time: 12 minutes  Documentation critical care time: 9 minutes  Consulting other physicians critical care time: 3 minutes  Total critical care time (exclusive of procedural time) : 46 minutes  Critical care time was exclusive of separately billable procedures and treating other patients and teaching time.  Critical care was time spent personally by me on the following activities: evaluation of patient's response to treatment, examination of patient, ordering and performing treatments and interventions, ordering and review of laboratory studies, ordering and review of radiographic studies, pulse oximetry, re-evaluation of patient's condition and review of old charts.      Labs Reviewed   CBC W/ AUTO DIFFERENTIAL - Abnormal; Notable for the following components:       Result Value    Hemoglobin 11.7 (*)     MCH 26.8 (*)     MCHC 30.7 (*)     RDW 15.6 (*)     Eos # 0.7 (*)     All other components within normal limits    Narrative:     Collection has been rescheduled by [email or web address] at 07/07/2023 18:59 Reason:   Patient unavailable. Pt in xray   COMPREHENSIVE METABOLIC PANEL - Abnormal; Notable for the following components:    BUN 24 (*)     AST 93 (*)     ALT 75 (*)     eGFR 42 (*)     All other components within normal limits    Narrative:     Collection has been rescheduled by [email or web address] at 07/07/2023 18:59 Reason:   Patient unavailable. Pt in xray   CBC W/ AUTO DIFFERENTIAL - Abnormal; Notable for the following  components:    RBC 3.93 (*)     Hemoglobin 10.8 (*)     Hematocrit 34.6 (*)     MCHC 31.2 (*)     RDW 15.7 (*)     Platelets 147 (*)     Eos # 0.8 (*)     Eosinophil % 10.7 (*)     All other components within normal limits   TROPONIN I    Narrative:     Collection has been rescheduled by [email or web address] at 07/07/2023 18:59 Reason:   Patient unavailable. Pt in xray   B-TYPE NATRIURETIC PEPTIDE    Narrative:     Collection has been rescheduled by [email or web address] at 07/07/2023 18:59 Reason:   Patient unavailable. Pt in xray   TROPONIN I    Narrative:     Collection has been rescheduled by [email or web address] at 07/07/2023 18:59 Reason:   Patient unavailable. Pt in xray   TROPONIN I   COMPREHENSIVE METABOLIC PANEL   MAGNESIUM   PHOSPHORUS   POCT GLUCOSE MONITORING CONTINUOUS     EKG Readings: (Independently Interpreted)   Rhythm: Normal Sinus Rhythm. Heart Rate: 86. Ectopy: No Ectopy. Conduction: RBBB and LAFB. ST Segments: Normal ST Segments. Axis: Normal. Q Waves: V2. Clinical Impression: Normal Sinus Rhythm     Imaging Results              X-Ray Chest AP Portable (Final result)  Result time 07/07/23 19:50:02      Final result by Hien Malik MD (07/07/23 19:50:02)                   Impression:      No acute or significant focal abnormality involving the chest.      Electronically signed by: Hien Malik  Date:    07/07/2023  Time:    19:50               Narrative:    EXAMINATION:  XR CHEST AP PORTABLE    CLINICAL HISTORY:  Chest Pain;    TECHNIQUE:  Single frontal view of the chest was performed.    COMPARISON:  06/19/2023, 04/10/2023, 09/13/2022    FINDINGS:  Cardiomediastinal silhouette is stable and not significantly enlarged.  There is no evidence of acute lung parenchymal or pleural abnormality.  Degenerative changes of the spine and shoulders as well as postoperative changes of the cervical spine with hardware again noted.                                       Medications   LORazepam tablet 1 mg (has no administration in time range)    clopidogreL tablet 75 mg (has no administration in time range)   isosorbide mononitrate 24 hr tablet 30 mg (has no administration in time range)   levothyroxine tablet 75 mcg (has no administration in time range)   losartan tablet 50 mg (has no administration in time range)   metoprolol tartrate (LOPRESSOR) split tablet 12.5 mg (has no administration in time range)   nitroGLYCERIN SL tablet 0.4 mg (has no administration in time range)   tacrolimus capsule 1 mg (has no administration in time range)   sodium chloride 0.9% flush 10 mL (has no administration in time range)   ondansetron injection 4 mg (has no administration in time range)   prochlorperazine injection Soln 5 mg (has no administration in time range)   senna-docusate 8.6-50 mg per tablet 1 tablet (has no administration in time range)   acetaminophen tablet 650 mg (has no administration in time range)   simethicone chewable tablet 80 mg (has no administration in time range)   aluminum-magnesium hydroxide-simethicone 200-200-20 mg/5 mL suspension 30 mL (has no administration in time range)   naloxone 0.4 mg/mL injection 0.02 mg (has no administration in time range)   glucose chewable tablet 16 g (has no administration in time range)   glucose chewable tablet 24 g (has no administration in time range)   glucagon (human recombinant) injection 1 mg (has no administration in time range)   insulin aspart U-100 pen 1-10 Units (has no administration in time range)   melatonin tablet 6 mg (has no administration in time range)   dextrose 10% bolus 125 mL 125 mL (has no administration in time range)   dextrose 10% bolus 250 mL 250 mL (has no administration in time range)   famotidine tablet 40 mg (has no administration in time range)   aspirin tablet 325 mg (325 mg Oral Given 7/7/23 2148)   ondansetron injection 4 mg (4 mg Intravenous Given 7/7/23 2148)   nitroGLYCERIN 2% TD oint ointment 1 inch (1 inch Topical (Top) Given 7/7/23 2148)     Medical Decision Making:    ED Management:  Patient with known coronary artery disease presents with symptomatology consistent with coronary insufficiency with a known blood test was 60% occlusion.  Patient recently had stents approximately 2 weeks ago.  Patient at present has received aspirin and IV fluids as well as nitro paste to her anterior chest wall.  Patient is also diabetic and that has been monitored as well.  I considered GI  musculoskeletal vascular and cardiopulmonary etiologies for the presenting symptomatology.  Feel the patient warrants admission to the hospital with an anginal equivalent.                        Clinical Impression:   Final diagnoses:  [R07.9] Chest pain        ED Disposition Condition    Observation                 Dante Shafer MD  07/08/23 0540       Dante Shafer MD  07/08/23 0541

## 2023-07-08 NOTE — ED NOTES
Pt states she took one of her vybrid pills that her partner brought from home since we do not have it on her mar here.

## 2023-07-08 NOTE — ASSESSMENT & PLAN NOTE
Patient has chronic hypothyroidism. TFTs reviewed-   Lab Results   Component Value Date    TSH 1.913 04/26/2023   . Will continue chronic levothyroxine and adjust for and clinical changes.

## 2023-07-08 NOTE — ED NOTES
Unable to give report at this time, per  Elias solitario rn. House supervisor notified. Will call back in 30 minutes

## 2023-07-08 NOTE — ASSESSMENT & PLAN NOTE
Patient is not chronically on statin.will not continue for now. Monitor clinically. Last LDL was   Lab Results   Component Value Date    LDLCALC 27.6 (L) 04/26/2023

## 2023-07-08 NOTE — SUBJECTIVE & OBJECTIVE
Interval History:  Patient seen and examined.  Family at bedside.  Patient reports continued chest pain and overall feeling poorly.  Troponins negative so far.  Blood pressure is elevated.  Cardiology consulted.    Review of Systems   Constitutional:  Negative for activity change, appetite change, chills and fever.   HENT:  Negative for congestion, sore throat and trouble swallowing.    Eyes:  Negative for photophobia and visual disturbance.   Respiratory:  Negative for cough, chest tightness and shortness of breath.    Cardiovascular:  Positive for chest pain. Negative for palpitations and leg swelling.   Gastrointestinal:  Positive for nausea and vomiting. Negative for abdominal pain and diarrhea.   Genitourinary:  Negative for dysuria, flank pain and hematuria.   Musculoskeletal:  Negative for back pain.   Neurological:  Negative for dizziness, weakness and headaches.   Psychiatric/Behavioral:  Negative for confusion.    Objective:     Vital Signs (Most Recent):  Temp: 98.2 °F (36.8 °C) (07/08/23 1112)  Pulse: 63 (07/08/23 1112)  Resp: 17 (07/08/23 1112)  BP: (!) 182/79 (07/08/23 1112)  SpO2: 95 % (07/08/23 1112) Vital Signs (24h Range):  Temp:  [98.2 °F (36.8 °C)-98.3 °F (36.8 °C)] 98.2 °F (36.8 °C)  Pulse:  [63-86] 63  Resp:  [17-20] 17  SpO2:  [93 %-97 %] 95 %  BP: (146-234)/() 182/79     Weight: (P) 90.6 kg (199 lb 11.2 oz)  Body mass index is 31.28 kg/m² (pended).  No intake or output data in the 24 hours ending 07/08/23 1351      Physical Exam  Vitals reviewed.   Constitutional:       Appearance: Normal appearance. She is obese.   HENT:      Head: Normocephalic.      Mouth/Throat:      Mouth: Mucous membranes are moist.      Pharynx: Oropharynx is clear.   Eyes:      Pupils: Pupils are equal, round, and reactive to light.   Cardiovascular:      Rate and Rhythm: Normal rate and regular rhythm.      Pulses: Normal pulses.      Heart sounds: Murmur heard.   Pulmonary:      Effort: Pulmonary effort is  normal.      Breath sounds: Normal breath sounds.   Abdominal:      General: Bowel sounds are normal.      Palpations: Abdomen is soft.   Musculoskeletal:         General: Normal range of motion.      Cervical back: Normal range of motion and neck supple.   Skin:     General: Skin is warm and dry.   Neurological:      Mental Status: She is alert and oriented to person, place, and time. Mental status is at baseline.   Psychiatric:         Mood and Affect: Mood normal.         Speech: Speech normal.         Behavior: Behavior normal.           Significant Labs: All pertinent labs within the past 24 hours have been reviewed.    Significant Imaging: I have reviewed all pertinent imaging results/findings within the past 24 hours.

## 2023-07-08 NOTE — PROGRESS NOTES
[facility]  Progress Note    Patient Name: Andra Newell  MRN: [id number]  Patient Class: OP- Observation   Admission Date: 7/7/2023  Length of Stay: 0 days  Attending Physician: Komal Maret MD  Primary Care Provider: Fady Vaughn MD        Subjective:     Principal Problem:Chest pain        HPI:  Andra Newell is a 63-year-old female with a past medical history of anemia, arthritis, DM type 2, depression, CAD S/P stents (08/2022), and S/P renal transplant (2019) who presents with chest pain and associated nausea and vomiting the past week.  Patient was seen in previous ER on 06/19/2023 with similar symptoms and workup was negative.  She was followed up with her cardiologist, , on 6/21/23 and scheduled for outpatient cardiac PET. Patient's most recent stress and echo were performed 04/2023.  Patient states she has been experiencing intermittent chest pain for 1 week and describes it as a heaviness.  Patient states she takes nitroglycerin daily and it will provide relief for a few minutes.  EKG showed normal sinus rhythm with no ST elevation or depression, troponin 0.007, AST 93, ALT 75, eGFR 42 (38.5 last week) and hemoglobin 11.7.  Chest x-ray showed no acute abnormality.  Patient was given nitro paste in ED with some relief.  Patient referred to Hospital Medicine and seen in the ED.  Patient reports chest pain continues to occur intermittently with no precipitating factors.  Patient also reports chest tenderness and states this is not new.  Patient reports vomiting earlier today and denies any further episodes.  Patient denies shortness of breath, nausea, diarrhea, fever and chills.  Patient will be admitted to Hospital Medicine for further evaluation and management.          Overview/Hospital Course:  No notes on file    Interval History:  Patient seen and examined.  Family at bedside.  Patient reports continued chest pain and overall  feeling poorly.  Troponins negative so far.  Blood pressure is elevated.  Cardiology consulted.    Review of Systems   Constitutional:  Negative for activity change, appetite change, chills and fever.   HENT:  Negative for congestion, sore throat and trouble swallowing.    Eyes:  Negative for photophobia and visual disturbance.   Respiratory:  Negative for cough, chest tightness and shortness of breath.    Cardiovascular:  Positive for chest pain. Negative for palpitations and leg swelling.   Gastrointestinal:  Positive for nausea and vomiting. Negative for abdominal pain and diarrhea.   Genitourinary:  Negative for dysuria, flank pain and hematuria.   Musculoskeletal:  Negative for back pain.   Neurological:  Negative for dizziness, weakness and headaches.   Psychiatric/Behavioral:  Negative for confusion.    Objective:     Vital Signs (Most Recent):  Temp: 98.2 °F (36.8 °C) (07/08/23 1112)  Pulse: 63 (07/08/23 1112)  Resp: 17 (07/08/23 1112)  BP: (!) 182/79 (07/08/23 1112)  SpO2: 95 % (07/08/23 1112) Vital Signs (24h Range):  Temp:  [98.2 °F (36.8 °C)-98.3 °F (36.8 °C)] 98.2 °F (36.8 °C)  Pulse:  [63-86] 63  Resp:  [17-20] 17  SpO2:  [93 %-97 %] 95 %  BP: (146-234)/() 182/79     Weight: (P) 90.6 kg (199 lb 11.2 oz)  Body mass index is 31.28 kg/m² (pended).  No intake or output data in the 24 hours ending 07/08/23 1351      Physical Exam  Vitals reviewed.   Constitutional:       Appearance: Normal appearance. She is obese.   HENT:      Head: Normocephalic.      Mouth/Throat:      Mouth: Mucous membranes are moist.      Pharynx: Oropharynx is clear.   Eyes:      Pupils: Pupils are equal, round, and reactive to light.   Cardiovascular:      Rate and Rhythm: Normal rate and regular rhythm.      Pulses: Normal pulses.      Heart sounds: Murmur heard.   Pulmonary:      Effort: Pulmonary effort is normal.      Breath sounds: Normal breath sounds.   Abdominal:      General: Bowel sounds are normal.      Palpations:  Abdomen is soft.   Musculoskeletal:         General: Normal range of motion.      Cervical back: Normal range of motion and neck supple.   Skin:     General: Skin is warm and dry.   Neurological:      Mental Status: She is alert and oriented to person, place, and time. Mental status is at baseline.   Psychiatric:         Mood and Affect: Mood normal.         Speech: Speech normal.         Behavior: Behavior normal.           Significant Labs: All pertinent labs within the past 24 hours have been reviewed.    Significant Imaging: I have reviewed all pertinent imaging results/findings within the past 24 hours.      Assessment/Plan:      * Chest pain  -Cardiology consulted, recommendations appreciated  -Trend troponin  -Telemetry  -check amylase      Acquired hypothyroidism  Patient has chronic hypothyroidism. TFTs reviewed-   Lab Results   Component Value Date    TSH 1.913 04/26/2023   . Will continue chronic levothyroxine and adjust for and clinical changes.    Recheck TSH    Hypertension associated with stage 3 chronic kidney disease due to type 2 diabetes mellitus  Chronic, controlled.  Latest blood pressure and vitals reviewed-     Temp:  [98.3 °F (36.8 °C)]   Pulse:  [69-86]   Resp:  [18-20]   BP: (146-234)/()   SpO2:  [94 %-97 %] .   Home meds for hypertension were reviewed.    While in the hospital, will manage blood pressure as follows; Continue home antihypertensive regimen    Will utilize p.r.n. blood pressure medication only if patient's blood pressure greater than 180/110 and she develops symptoms such as worsening chest pain or shortness of breath.        Kidney transplant recipient  History noted    -Continue tacrolimus  -Monitor renal function      Gastroesophageal reflux disease without esophagitis  History noted    -Continue PPI      Mixed hyperlipidemia   Patient is not chronically on statin.will not continue for now. Monitor clinically. Last LDL was   Lab Results   Component Value Date     LDLCALC 27.6 (L) 04/26/2023            Anemia of chronic disease  Patient's anemia is currently controlled. Has not received any PRBCs to date.. Etiology likely d/t chronic disease  Current CBC reviewed-   Lab Results   Component Value Date    HGB 10.8 (L) 07/08/2023    HCT 34.6 (L) 07/08/2023     Monitor serial CBC and transfuse if patient becomes hemodynamically unstable, symptomatic or H/H drops below 7/21.         Bilateral low back pain without sciatica  Chronic    Patient has indwelling fentanyl pump      MDD (major depressive disorder), recurrent episode, mild  Patient has persistent depression which is moderate and is currently controlled. Will Continue anti-depressant medications. We will not consult psychiatry at this time. Patient does not display psychosis at this time. Continue to monitor closely and adjust plan of care as needed.        Controlled type 2 diabetes mellitus with complication, with long-term current use of insulin  Patient's FSGs are controlled on current medication regimen.  Last A1c reviewed-   Lab Results   Component Value Date    HGBA1C 6.7 (H) 06/30/2023     Most recent fingerstick glucose reviewed-   Recent Labs   Lab 07/08/23  0618 07/08/23  1130   POCTGLUCOSE 100 114*     Current correctional scale  Low  Maintain anti-hyperglycemic dose as follows-   Antihyperglycemics (From admission, onward)    Start     Stop Route Frequency Ordered    07/08/23 0310  insulin aspart U-100 pen 1-10 Units         -- SubQ Before meals & nightly PRN 07/08/23 0213        Hold Oral hypoglycemics while patient is in the hospital.      VTE Risk Mitigation (From admission, onward)         Ordered     IP VTE HIGH RISK PATIENT  Once         07/08/23 0213     Place sequential compression device  Until discontinued         07/08/23 0213     Reason for No Pharmacological VTE Prophylaxis  Once        Question:  Reasons:  Answer:  Patient is Ambulatory    07/08/23 0213                Discharge Planning   DYANA:       Code Status: Full Code   Is the patient medically ready for discharge?:     Reason for patient still in hospital (select all that apply): Patient trending condition and Treatment                     Komal Marte MD  Department of Hospital Medicine   [facility]

## 2023-07-08 NOTE — ASSESSMENT & PLAN NOTE
Patient has chronic hypothyroidism. TFTs reviewed-   Lab Results   Component Value Date    TSH 1.913 04/26/2023   . Will continue chronic levothyroxine and adjust for and clinical changes.    Recheck TSH

## 2023-07-08 NOTE — H&P
"[facility] Medicine  History & Physical    Patient Name: Andra Newell  MRN: [id number]  Patient Class: OP- Observation  Admission Date: 7/7/2023  Attending Physician: Komal Marte MD   Primary Care Provider: Fady Vaughn MD         Patient information was obtained from patient, past medical records and ER records.     Subjective:     Principal Problem:Chest pain    Chief Complaint:   Chief Complaint   Patient presents with    Chest Pain     S&S x1 week.  Chest pain, N&V. Pt has stent placed, has been taking one nitroglycerin daily "with only a little relief for 5 minutes"  was seen and discharged from [facility] ER when S&S started 1 week ago.        HPI: Andra Newell is a 63-year-old female with a past medical history of anemia, arthritis, DM type 2, depression, CAD S/P stents (08/2022), and S/P renal transplant (2019) who presents with chest pain and associated nausea and vomiting the past week.  Patient was seen in previous ER on 06/19/2023 with similar symptoms and workup was negative.  She was followed up with her cardiologist, , on 6/21/23 and scheduled for outpatient cardiac PET. Patient's most recent stress and echo were performed 04/2023.  Patient states she has been experiencing intermittent chest pain for 1 week and describes it as a heaviness.  Patient states she takes nitroglycerin daily and it will provide relief for a few minutes.  EKG showed normal sinus rhythm with no ST elevation or depression, troponin 0.007, AST 93, ALT 75, eGFR 42 (38.5 last week) and hemoglobin 11.7.  Chest x-ray showed no acute abnormality.  Patient was given nitro paste in ED with some relief.  Patient referred to Hospital Medicine and seen in the ED.  Patient reports chest pain continues to occur intermittently with no precipitating factors.  Patient also reports chest tenderness and states this is not new.  Patient reports vomiting earlier today and denies any " "further episodes.  Patient denies shortness of breath, nausea, diarrhea, fever and chills.  Patient will be admitted to Hospital Medicine for further evaluation and management.          Past Medical History:   Diagnosis Date    Anemia     Anemia in chronic kidney disease, on chronic dialysis 7/12/2017    Anxiety and depression     Aplastic anemia with parvovirus B19 infection 5/27/2019    Aplastic anemia with parvovirus B19 infection 4/2019 5/27/2019    Arthritis     Asthma     allergic airway    CKD stage III (moderate) 2/18/2019    Colon polyp     Depression     Diabetes mellitus     Diverticulosis     Encounter for blood transfusion     Eosinophilia     ESRD (end stage renal disease)     stage V, due for living donor 9/2018    ESRD on dialysis     GERD (gastroesophageal reflux disease)     Gout     Gout     Hyperlipidemia     Hypertension     Hypertriglyceridemia without hypercholesterolemia 6/9/2019    Low back pain     Low HDL (under 40) 6/9/2019    MRSA carrier     Neutropenia, unspecified 5/8/2019    Obesity     Renal disorder     Rotator cuff syndrome--left     Thyroid disease     Ulnar neuropathy of right upper extremity     Uncontrolled type 2 diabetes mellitus with hyperglycemia        Past Surgical History:   Procedure Laterality Date    ACHILLES TENDON SURGERY Left 05/2015    BACK SURGERY      CERVICAL SPINE SURGERY  2021    cervical gate placed by Dr. Vera- screws,plate, and "gate"    CHOLECYSTECTOMY      COLONOSCOPY N/A 09/06/2017    Procedure: COLONOSCOPY;  Surgeon: Marisol Bryson MD;  Location: [facility];  Service: Endoscopy;  Laterality: N/A; REPEAT IN 3 YEARS FOR SURVEILLANCE    COLONOSCOPY N/A 05/09/2019    Procedure: COLONOSCOPY;  Surgeon: Fady Ewing MD;  Location: [facility] ([address] [place] [address]);  Service: Endoscopy;  Laterality: N/A;    CORONARY ANGIOGRAPHY  08/31/2022    Procedure: ANGIOGRAM, CORONARY ARTERY;  Surgeon: Christoph Dang MD;  Location: " [facility] CATH;  Service: Cardiology;;    DIALYSIS FISTULA CREATION  12/2017    ESOPHAGOGASTRODUODENOSCOPY N/A 05/09/2019    Procedure: EGD (ESOPHAGOGASTRODUODENOSCOPY);  Surgeon: Fady Ewing MD;  Location: [facility] ENDO (2ND FLR);  Service: Endoscopy;  Laterality: N/A;    ESOPHAGOGASTRODUODENOSCOPY N/A 06/10/2021    Procedure: EGD (ESOPHAGOGASTRODUODENOSCOPY);  Surgeon: Marisol Bryson MD;  Location: [facility] ENDO;  Service: Endoscopy;  Laterality: N/A;    ESOPHAGOGASTRODUODENOSCOPY N/A 5/31/2023    Procedure: EGD (ESOPHAGOGASTRODUODENOSCOPY);  Surgeon: Marisol Bryson MD;  Location: [facility] ENDO;  Service: Endoscopy;  Laterality: N/A;    FRACTURE SURGERY  1990    Ankle knee    HEMORRHOID SURGERY      INJECTION OF ANESTHETIC AGENT AROUND MEDIAL BRANCH NERVES INNERVATING LUMBAR FACET JOINT Right 09/25/2019    Procedure: Block-nerve-medial branch-lumbar;  Surgeon: Yonny Ferrer MD;  Location: [facility] OR;  Service: Pain Management;  Laterality: Right;  L2, 3, 4,5     INJECTION OF ANESTHETIC AGENT AROUND MEDIAL BRANCH NERVES INNERVATING LUMBAR FACET JOINT Right 10/16/2019    Procedure: Block-nerve-medial branch-lumbar;  Surgeon: Yonny Ferrer MD;  Location: [facility] OR;  Service: Pain Management;  Laterality: Right;  L2, 3, 4,5     JOINT REPLACEMENT      left    KIDNEY TRANSPLANT N/A 01/14/2019    Procedure: TRANSPLANT, KIDNEY;  Surgeon: Roland Salazar MD;  Location: [facility] OR 2ND FLR;  Service: Transplant;  Laterality: N/A;    KNEE SURGERY      RADIOFREQUENCY ABLATION OF LUMBAR MEDIAL BRANCH NERVE AT SINGLE LEVEL Right 10/29/2019    Procedure: Radiofrequency Ablation, Nerve, Spinal, Lumbar, Medial Branch, 1 Level;  Surgeon: Yonny Ferrer MD;  Location: [facility] OR;  Service: Pain Management;  Laterality: Right;  L2, 3, 4, 5  burned at 80 degrees C X 60 seconds each site X 2    SHOULDER ARTHROSCOPY      SHOULDER SURGERY      SPINE SURGERY  2020    Pain pump in back    UPPER GASTROINTESTINAL ENDOSCOPY  ~2013    Dr. Moralez  "      Review of patient's allergies indicates:   Allergen Reactions    Dilaudid [hydromorphone] Itching    Morphine Itching    Torsemide Diarrhea     Diarrhea stopped once discontinued med    Codeine Itching     Can take oxycodone and hydrocodone with Benadryl       No current facility-administered medications on file prior to encounter.     Current Outpatient Medications on File Prior to Encounter   Medication Sig    acetaminophen (TYLENOL) 325 MG tablet Take 2 tablets (650 mg total) by mouth every 8 (eight) hours as needed for Pain.    amLODIPine (NORVASC) 5 MG tablet Take 1 tablet by mouth once daily.    aspirin (ECOTRIN) 81 MG EC tablet Take 1 tablet (81 mg total) by mouth once daily.    BD LENIN 2ND GEN PEN NEEDLE 32 gauge x 5/32" Ndle Use up to four daily to inject novolog (Patient not taking: Reported on 6/26/2023)    blood sugar diagnostic Strp Check glucose 3 (three) times daily. (Patient not taking: Reported on 6/26/2023)    blood-glucose sensor (DEXCOM G7 SENSOR) Amparo Change every 10 days (Patient not taking: Reported on 6/26/2023)    clopidogreL (PLAVIX) 75 mg tablet TAKE ONE TABLET BY MOUTH DAILY    colchicine (COLCRYS) 0.6 mg tablet Take 1 tablet (0.6 mg total) by mouth 2 (two) times daily. (Patient not taking: Reported on 6/26/2023)    DEXCOM G7  Misc Dispense 1 (Patient not taking: Reported on 6/26/2023)    doxazosin (CARDURA) 2 MG tablet Take 2 mg by mouth once daily.    DULoxetine (CYMBALTA) 30 MG capsule Take 30 mg by mouth once daily.    empagliflozin (JARDIANCE) 10 mg tablet Take 1 tablet (10 mg total) by mouth once daily.    famotidine (PEPCID) 40 MG tablet Take 1 tablet (40 mg total) by mouth every evening.    flash glucose sensor (FREESTYLE DAKOTAH 14 DAY SENSOR) Kit Use 1 sensor every 14 days to check blood glucose (Patient not taking: Reported on 6/26/2023)    insulin aspart U-100 (NOVOLOG FLEXPEN U-100 INSULIN) 100 unit/mL (3 mL) InPn pen 32 u breakfast and supper, " 28 u lunch + correction. Max TDD 140u    insulin degludec (TRESIBA FLEXTOUCH U-200) 200 unit/mL (3 mL) insulin pen Inject 76 Units into the skin once daily.    irbesartan (AVAPRO) 150 MG tablet Take 1 tablet by mouth once daily.    isosorbide mononitrate (IMDUR) 30 MG 24 hr tablet TAKE ONE TABLET BY MOUTH DAILY    lancets (ONETOUCH ULTRASOFT LANCETS) Misc Use to test blood sugar 3 x's a day (Patient not taking: Reported on 6/26/2023)    lansoprazole (PREVACID) 30 MG capsule Take 1 capsule (30 mg total) by mouth 2 (two) times a day.    levothyroxine (SYNTHROID) 75 MCG tablet TAKE ONE TABLET BY MOUTH ONCE DAILY    LIDOcaine (LIDODERM) 5 % 1 patch.    LORazepam (ATIVAN) 1 MG tablet Take 1 mg by mouth every evening.    losartan (COZAAR) 50 MG tablet Take 1 tablet (50 mg total) by mouth once daily.    magnesium oxide 500 mg Tab Take 500 mg by mouth once daily.    metFORMIN (GLUCOPHAGE-XR) 500 MG ER 24hr tablet Take 1 tablet (500 mg total) by mouth 2 (two) times daily with meals.    metoprolol tartrate (LOPRESSOR) 25 MG tablet Take 0.5 tablets (12.5 mg total) by mouth 2 (two) times daily.    multivitamin (THERAGRAN) tablet Take 1 tablet by mouth once daily.    nitroGLYCERIN (NITROSTAT) 0.4 MG SL tablet Place 1 tablet (0.4 mg total) under the tongue every 5 (five) minutes as needed for Chest pain. (Patient not taking: Reported on 6/26/2023)    ondansetron (ZOFRAN) 4 MG tablet Take 1 tablet (4 mg total) by mouth every 6 (six) hours. (Patient not taking: Reported on 6/26/2023)    sucralfate (CARAFATE) 1 gram tablet Take 1 tablet (1 g total) by mouth every 6 (six) hours as needed (GERD).    tacrolimus (PROGRAF) 1 MG Cap Take 1 capsule (1 mg total) by mouth every 12 (twelve) hours.    tiZANidine (ZANAFLEX) 4 MG tablet Take 1 tablet by mouth every evening.    vilazodone (VIIBRYD) 40 mg Tab tablet Take 1 tablet (40 mg total) by mouth once daily.    VRAYLAR 3 mg Cap Take 3 mg by mouth once daily.     Family  History       Problem Relation (Age of Onset)    Alzheimer's disease Mother    Arthritis Mother    COPD Maternal Aunt    Depression Mother    Diabetes Mother, Sister, Maternal Grandmother, Maternal Aunt    Heart disease Mother, Father, Brother, Maternal Aunt    Hyperlipidemia Mother    Hypertension Maternal Grandmother, Maternal Aunt    Kidney disease Maternal Grandmother    Lupus Sister    Mental illness Sister    No Known Problems Son, Paternal Grandmother, Paternal Grandfather, Maternal Uncle, Paternal Aunt, Paternal Uncle    Ovarian cancer Sister    Stroke Father    Thyroid disease Mother          Tobacco Use    Smoking status: Never    Smokeless tobacco: Never   Substance and Sexual Activity    Alcohol use: No     Alcohol/week: 0.0 standard drinks    Drug use: No    Sexual activity: Yes     Partners: Female     Review of Systems   Constitutional:  Negative for activity change, appetite change, chills and fever.   HENT:  Negative for congestion, sore throat and trouble swallowing.    Eyes:  Negative for photophobia and visual disturbance.   Respiratory:  Negative for cough, chest tightness and shortness of breath.    Cardiovascular:  Positive for chest pain. Negative for palpitations and leg swelling.   Gastrointestinal:  Positive for nausea and vomiting. Negative for abdominal pain and diarrhea.   Genitourinary:  Negative for dysuria, flank pain and hematuria.   Musculoskeletal:  Negative for back pain.   Neurological:  Negative for dizziness, weakness and headaches.   Psychiatric/Behavioral:  Negative for confusion.    Objective:     Vital Signs (Most Recent):  Temp: 98.3 °F (36.8 °C) (07/07/23 1727)  Pulse: 74 (07/08/23 0102)  Resp: 18 (07/07/23 2257)  BP: (!) 162/75 (07/08/23 0102)  SpO2: 95 % (07/08/23 0102) Vital Signs (24h Range):  Temp:  [98.3 °F (36.8 °C)] 98.3 °F (36.8 °C)  Pulse:  [69-86] 74  Resp:  [18-20] 18  SpO2:  [94 %-97 %] 95 %  BP: (146-234)/() 162/75     Weight: 90.7 kg (200  lb)  Body mass index is 31.32 kg/m².     Physical Exam  Vitals reviewed.   Constitutional:       Appearance: Normal appearance. She is obese.   HENT:      Head: Normocephalic.      Mouth/Throat:      Mouth: Mucous membranes are moist.      Pharynx: Oropharynx is clear.   Eyes:      Pupils: Pupils are equal, round, and reactive to light.   Cardiovascular:      Rate and Rhythm: Normal rate and regular rhythm.      Pulses: Normal pulses.      Heart sounds: Murmur heard.   Pulmonary:      Effort: Pulmonary effort is normal.      Breath sounds: Normal breath sounds.   Chest:      Chest wall: Tenderness present.   Abdominal:      General: Bowel sounds are normal.      Palpations: Abdomen is soft.   Musculoskeletal:         General: Normal range of motion.      Cervical back: Normal range of motion and neck supple.   Skin:     General: Skin is warm and dry.   Neurological:      Mental Status: She is alert and oriented to person, place, and time. Mental status is at baseline.   Psychiatric:         Mood and Affect: Mood normal.         Speech: Speech normal.         Behavior: Behavior normal.            CRANIAL NERVES     CN III, IV, VI   Pupils are equal, round, and reactive to light.     Significant Labs: All pertinent labs within the past 24 hours have been reviewed.  CBC:   Recent Labs   Lab 07/07/23 2015   WBC 8.94   HGB 11.7*   HCT 38.1        CMP:   Recent Labs   Lab 07/07/23 2015      K 4.9      CO2 28      BUN 24*   CREATININE 1.4   CALCIUM 10.2   PROT 8.2   ALBUMIN 4.3   BILITOT 0.6   ALKPHOS 128   AST 93*   ALT 75*   ANIONGAP 14       Significant Imaging: I have reviewed all pertinent imaging results/findings within the past 24 hours.\    Imaging Results              X-Ray Chest AP Portable (Final result)  Result time 07/07/23 19:50:02      Final result by Hien Malik MD (07/07/23 19:50:02)                   Impression:      No acute or significant focal abnormality involving  the chest.      Electronically signed by: Hien Malik  Date:    07/07/2023  Time:    19:50               Narrative:    EXAMINATION:  XR CHEST AP PORTABLE    CLINICAL HISTORY:  Chest Pain;    TECHNIQUE:  Single frontal view of the chest was performed.    COMPARISON:  06/19/2023, 04/10/2023, 09/13/2022    FINDINGS:  Cardiomediastinal silhouette is stable and not significantly enlarged.  There is no evidence of acute lung parenchymal or pleural abnormality.  Degenerative changes of the spine and shoulders as well as postoperative changes of the cervical spine with hardware again noted.                                        Assessment/Plan:     * Chest pain  -Cardiology consulted, recommendations appreciated  -Trend troponin  -Telemetry      Acquired hypothyroidism  Patient has chronic hypothyroidism. TFTs reviewed-   Lab Results   Component Value Date    TSH 1.913 04/26/2023   . Will continue chronic levothyroxine and adjust for and clinical changes.        Hypertension associated with stage 3 chronic kidney disease due to type 2 diabetes mellitus  Chronic, controlled.  Latest blood pressure and vitals reviewed-     Temp:  [98.3 °F (36.8 °C)]   Pulse:  [69-86]   Resp:  [18-20]   BP: (146-234)/()   SpO2:  [94 %-97 %] .   Home meds for hypertension were reviewed.    While in the hospital, will manage blood pressure as follows; Continue home antihypertensive regimen    Will utilize p.r.n. blood pressure medication only if patient's blood pressure greater than 180/110 and she develops symptoms such as worsening chest pain or shortness of breath.        Kidney transplant recipient  History noted    -Continue tacrolimus  -Monitor renal function      Gastroesophageal reflux disease without esophagitis  History noted    -Continue PPI      Mixed hyperlipidemia   Patient is not chronically on statin.will not continue for now. Monitor clinically. Last LDL was   Lab Results   Component Value Date    LDLCALC 27.6 (L)  04/26/2023            Anemia of chronic disease  Patient's anemia is currently controlled. Has not received any PRBCs to date.. Etiology likely d/t chronic disease  Current CBC reviewed-   Lab Results   Component Value Date    HGB 11.7 (L) 07/07/2023    HCT 38.1 07/07/2023     Monitor serial CBC and transfuse if patient becomes hemodynamically unstable, symptomatic or H/H drops below 7/21.         Bilateral low back pain without sciatica  Chronic    Patient has indwelling fentanyl pump      MDD (major depressive disorder), recurrent episode, mild  Patient has persistent depression which is moderate and is currently controlled. Will Continue anti-depressant medications. We will not consult psychiatry at this time. Patient does not display psychosis at this time. Continue to monitor closely and adjust plan of care as needed.        Controlled type 2 diabetes mellitus with complication, with long-term current use of insulin  Patient's FSGs are controlled on current medication regimen.  Last A1c reviewed-   Lab Results   Component Value Date    HGBA1C 6.7 (H) 06/30/2023     Most recent fingerstick glucose reviewed- No results for input(s): POCTGLUCOSE in the last 24 hours.  Current correctional scale  Low  Maintain anti-hyperglycemic dose as follows-   Antihyperglycemics (From admission, onward)    Start     Stop Route Frequency Ordered    07/08/23 0310  insulin aspart U-100 pen 1-10 Units         -- SubQ Before meals & nightly PRN 07/08/23 0213        Hold Oral hypoglycemics while patient is in the hospital.      VTE Risk Mitigation (From admission, onward)         Ordered     IP VTE HIGH RISK PATIENT  Once         07/08/23 0213     Place sequential compression device  Until discontinued         07/08/23 0213     Reason for No Pharmacological VTE Prophylaxis  Once        Question:  Reasons:  Answer:  Patient is Ambulatory    07/08/23 0213                     Pia Roberto NP  Department of Hospital  Medicine  [facility]

## 2023-07-08 NOTE — ASSESSMENT & PLAN NOTE
Patient's FSGs are controlled on current medication regimen.  Last A1c reviewed-   Lab Results   Component Value Date    HGBA1C 6.7 (H) 06/30/2023     Most recent fingerstick glucose reviewed-   Recent Labs   Lab 07/08/23  0618 07/08/23  1130   POCTGLUCOSE 100 114*     Current correctional scale  Low  Maintain anti-hyperglycemic dose as follows-   Antihyperglycemics (From admission, onward)    Start     Stop Route Frequency Ordered    07/08/23 0310  insulin aspart U-100 pen 1-10 Units         -- SubQ Before meals & nightly PRN 07/08/23 0213        Hold Oral hypoglycemics while patient is in the hospital.

## 2023-07-09 LAB
ALBUMIN SERPL BCP-MCNC: 3.9 G/DL (ref 3.5–5.2)
ALP SERPL-CCNC: 107 U/L (ref 55–135)
ALT SERPL W/O P-5'-P-CCNC: 70 U/L (ref 10–44)
ANION GAP SERPL CALC-SCNC: 11 MMOL/L (ref 8–16)
AST SERPL-CCNC: 77 U/L (ref 10–40)
BASOPHILS # BLD AUTO: 0.06 K/UL (ref 0–0.2)
BASOPHILS NFR BLD: 0.9 % (ref 0–1.9)
BILIRUB SERPL-MCNC: 0.7 MG/DL (ref 0.1–1)
BUN SERPL-MCNC: 20 MG/DL (ref 8–23)
CALCIUM SERPL-MCNC: 9.3 MG/DL (ref 8.7–10.5)
CHLORIDE SERPL-SCNC: 105 MMOL/L (ref 95–110)
CO2 SERPL-SCNC: 24 MMOL/L (ref 23–29)
CREAT SERPL-MCNC: 1.1 MG/DL (ref 0.5–1.4)
DIFFERENTIAL METHOD: ABNORMAL
EOSINOPHIL # BLD AUTO: 0.8 K/UL (ref 0–0.5)
EOSINOPHIL NFR BLD: 11.8 % (ref 0–8)
ERYTHROCYTE [DISTWIDTH] IN BLOOD BY AUTOMATED COUNT: 15.7 % (ref 11.5–14.5)
EST. GFR  (NO RACE VARIABLE): 56 ML/MIN/1.73 M^2
GLUCOSE SERPL-MCNC: 140 MG/DL (ref 70–110)
HCT VFR BLD AUTO: 36.7 % (ref 37–48.5)
HGB BLD-MCNC: 11.3 G/DL (ref 12–16)
IMM GRANULOCYTES # BLD AUTO: 0.01 K/UL (ref 0–0.04)
IMM GRANULOCYTES NFR BLD AUTO: 0.2 % (ref 0–0.5)
LYMPHOCYTES # BLD AUTO: 1.6 K/UL (ref 1–4.8)
LYMPHOCYTES NFR BLD: 25.4 % (ref 18–48)
MAGNESIUM SERPL-MCNC: 1.8 MG/DL (ref 1.6–2.6)
MCH RBC QN AUTO: 27.1 PG (ref 27–31)
MCHC RBC AUTO-ENTMCNC: 30.8 G/DL (ref 32–36)
MCV RBC AUTO: 88 FL (ref 82–98)
MONOCYTES # BLD AUTO: 0.6 K/UL (ref 0.3–1)
MONOCYTES NFR BLD: 8.7 % (ref 4–15)
NEUTROPHILS # BLD AUTO: 3.4 K/UL (ref 1.8–7.7)
NEUTROPHILS NFR BLD: 53 % (ref 38–73)
NRBC BLD-RTO: 0 /100 WBC
PHOSPHATE SERPL-MCNC: 3.6 MG/DL (ref 2.7–4.5)
PLATELET # BLD AUTO: 152 K/UL (ref 150–450)
PMV BLD AUTO: 10.4 FL (ref 9.2–12.9)
POTASSIUM SERPL-SCNC: 4.1 MMOL/L (ref 3.5–5.1)
PROT SERPL-MCNC: 7.4 G/DL (ref 6–8.4)
RBC # BLD AUTO: 4.17 M/UL (ref 4–5.4)
SODIUM SERPL-SCNC: 140 MMOL/L (ref 136–145)
WBC # BLD AUTO: 6.45 K/UL (ref 3.9–12.7)

## 2023-07-09 PROCEDURE — 84100 ASSAY OF PHOSPHORUS: CPT | Performed by: NURSE PRACTITIONER

## 2023-07-09 PROCEDURE — 36415 COLL VENOUS BLD VENIPUNCTURE: CPT | Performed by: NURSE PRACTITIONER

## 2023-07-09 PROCEDURE — 25000003 PHARM REV CODE 250: Performed by: HOSPITALIST

## 2023-07-09 PROCEDURE — 99213 OFFICE O/P EST LOW 20 MIN: CPT | Mod: ,,, | Performed by: INTERNAL MEDICINE

## 2023-07-09 PROCEDURE — 25000003 PHARM REV CODE 250: Performed by: NURSE PRACTITIONER

## 2023-07-09 PROCEDURE — G0378 HOSPITAL OBSERVATION PER HR: HCPCS

## 2023-07-09 PROCEDURE — 85025 COMPLETE CBC W/AUTO DIFF WBC: CPT | Performed by: NURSE PRACTITIONER

## 2023-07-09 PROCEDURE — 83735 ASSAY OF MAGNESIUM: CPT | Performed by: NURSE PRACTITIONER

## 2023-07-09 PROCEDURE — 94761 N-INVAS EAR/PLS OXIMETRY MLT: CPT

## 2023-07-09 PROCEDURE — 99213 PR OFFICE/OUTPT VISIT, EST, LEVL III, 20-29 MIN: ICD-10-PCS | Mod: ,,, | Performed by: INTERNAL MEDICINE

## 2023-07-09 PROCEDURE — 80053 COMPREHEN METABOLIC PANEL: CPT | Performed by: NURSE PRACTITIONER

## 2023-07-09 PROCEDURE — 63600175 PHARM REV CODE 636 W HCPCS: Performed by: NURSE PRACTITIONER

## 2023-07-09 RX ORDER — POLYETHYLENE GLYCOL 3350 17 G/17G
17 POWDER, FOR SOLUTION ORAL 2 TIMES DAILY
Status: DISCONTINUED | OUTPATIENT
Start: 2023-07-09 | End: 2023-07-09

## 2023-07-09 RX ORDER — BISACODYL 10 MG
10 SUPPOSITORY, RECTAL RECTAL DAILY PRN
Status: DISCONTINUED | OUTPATIENT
Start: 2023-07-09 | End: 2023-07-11 | Stop reason: HOSPADM

## 2023-07-09 RX ORDER — SODIUM CHLORIDE 0.9 % (FLUSH) 0.9 %
10 SYRINGE (ML) INJECTION
Status: DISCONTINUED | OUTPATIENT
Start: 2023-07-09 | End: 2023-07-09

## 2023-07-09 RX ORDER — SODIUM CHLORIDE 9 MG/ML
INJECTION, SOLUTION INTRAVENOUS CONTINUOUS
Status: DISCONTINUED | OUTPATIENT
Start: 2023-07-09 | End: 2023-07-10

## 2023-07-09 RX ORDER — POLYETHYLENE GLYCOL 3350 17 G/17G
17 POWDER, FOR SOLUTION ORAL 2 TIMES DAILY
Status: DISCONTINUED | OUTPATIENT
Start: 2023-07-09 | End: 2023-07-11 | Stop reason: HOSPADM

## 2023-07-09 RX ORDER — SODIUM CHLORIDE 0.9 % (FLUSH) 0.9 %
10 SYRINGE (ML) INJECTION
Status: DISCONTINUED | OUTPATIENT
Start: 2023-07-09 | End: 2023-07-11 | Stop reason: HOSPADM

## 2023-07-09 RX ADMIN — METOPROLOL TARTRATE 12.5 MG: 25 TABLET, FILM COATED ORAL at 10:07

## 2023-07-09 RX ADMIN — INSULIN ASPART 2 UNITS: 100 INJECTION, SOLUTION INTRAVENOUS; SUBCUTANEOUS at 11:07

## 2023-07-09 RX ADMIN — LEVOTHYROXINE SODIUM 75 MCG: 0.03 TABLET ORAL at 09:07

## 2023-07-09 RX ADMIN — SENNOSIDES AND DOCUSATE SODIUM 1 TABLET: 50; 8.6 TABLET ORAL at 09:07

## 2023-07-09 RX ADMIN — VILAZODONE HYDROCHLORIDE 40 MG: 40 TABLET ORAL at 09:07

## 2023-07-09 RX ADMIN — ISOSORBIDE MONONITRATE 30 MG: 30 TABLET, EXTENDED RELEASE ORAL at 09:07

## 2023-07-09 RX ADMIN — TACROLIMUS 1 MG: 1 CAPSULE ORAL at 09:07

## 2023-07-09 RX ADMIN — ASPIRIN 81 MG: 81 TABLET, COATED ORAL at 09:07

## 2023-07-09 RX ADMIN — SENNOSIDES AND DOCUSATE SODIUM 1 TABLET: 50; 8.6 TABLET ORAL at 10:07

## 2023-07-09 RX ADMIN — METOPROLOL TARTRATE 12.5 MG: 25 TABLET, FILM COATED ORAL at 09:07

## 2023-07-09 RX ADMIN — LOSARTAN POTASSIUM 50 MG: 25 TABLET, FILM COATED ORAL at 09:07

## 2023-07-09 RX ADMIN — SODIUM CHLORIDE: 9 INJECTION, SOLUTION INTRAVENOUS at 10:07

## 2023-07-09 RX ADMIN — POLYETHYLENE GLYCOL 3350 17 G: 17 POWDER, FOR SOLUTION ORAL at 11:07

## 2023-07-09 RX ADMIN — TACROLIMUS 1 MG: 1 CAPSULE ORAL at 05:07

## 2023-07-09 RX ADMIN — INSULIN ASPART 4 UNITS: 100 INJECTION, SOLUTION INTRAVENOUS; SUBCUTANEOUS at 05:07

## 2023-07-09 RX ADMIN — FAMOTIDINE 40 MG: 20 TABLET ORAL at 09:07

## 2023-07-09 RX ADMIN — CLOPIDOGREL BISULFATE 75 MG: 75 TABLET, FILM COATED ORAL at 09:07

## 2023-07-09 RX ADMIN — SODIUM CHLORIDE: 9 INJECTION, SOLUTION INTRAVENOUS at 03:07

## 2023-07-09 NOTE — CARE UPDATE
07/08/23 2015   Patient Assessment/Suction   Level of Consciousness (AVPU) alert   Respiratory Effort Unlabored   PRE-TX-O2   Device (Oxygen Therapy) room air   SpO2 (!) 94 %   Pulse Oximetry Type Intermittent   $ Pulse Oximetry - Multiple Charge Pulse Oximetry - Multiple   Pulse 65   Resp 17        normal...

## 2023-07-09 NOTE — ASSESSMENT & PLAN NOTE
Patient's FSGs are controlled on current medication regimen.  Last A1c reviewed-   Lab Results   Component Value Date    HGBA1C 6.7 (H) 06/30/2023     Most recent fingerstick glucose reviewed-   Recent Labs   Lab 07/08/23  1130 07/08/23  1635 07/08/23  2108   POCTGLUCOSE 114* 208* 160*     Current correctional scale  Low  Maintain anti-hyperglycemic dose as follows-   Antihyperglycemics (From admission, onward)    Start     Stop Route Frequency Ordered    07/08/23 0310  insulin aspart U-100 pen 1-10 Units         -- SubQ Before meals & nightly PRN 07/08/23 0213        Hold Oral hypoglycemics while patient is in the hospital.

## 2023-07-09 NOTE — CARE UPDATE
07/09/23 0719   PRE-TX-O2   Device (Oxygen Therapy) room air   SpO2 95 %   Pulse Oximetry Type Intermittent   $ Pulse Oximetry - Multiple Charge Pulse Oximetry - Multiple   Pulse 62   Resp 18

## 2023-07-09 NOTE — ASSESSMENT & PLAN NOTE
Patient's anemia is currently controlled. Has not received any PRBCs to date.. Etiology likely d/t chronic disease  Current CBC reviewed-   Lab Results   Component Value Date    HGB 11.3 (L) 07/09/2023    HCT 36.7 (L) 07/09/2023     Monitor serial CBC and transfuse if patient becomes hemodynamically unstable, symptomatic or H/H drops below 7/21.

## 2023-07-09 NOTE — PLAN OF CARE
POC reviewed with Patient verbalizes understanding Tele monitored VSS afebrile Denies chest pain at this time tele monitored, blood glucose monitored managed with PRN sliding scale protocol continent of bladder

## 2023-07-09 NOTE — ASSESSMENT & PLAN NOTE
Patient has chronic hypothyroidism. TFTs reviewed-   Lab Results   Component Value Date    TSH 0.859 07/08/2023   . Will continue chronic levothyroxine and adjust for and clinical changes.  TSH within normal limits

## 2023-07-09 NOTE — PROGRESS NOTES
[facility]  Department of Cardiology  Consult Note      PATIENT NAME: Andra Newell    MRN: [id number]  TODAY'S DATE: 07/09/2023  ADMIT DATE: 7/7/2023                          CONSULT REQUESTED BY: Komal Marte MD    SUBJECTIVE     PRINCIPAL PROBLEM: Chest pain    7/9/23:  Seen and examined pt at noon today. Denied any chest pain however had CP episodes overnight.     REASON FOR CONSULT:  Chest pain       Admission HPI:  Andra Newell is a 63-year-old female with a past medical history of anemia, arthritis, DM type 2, depression, CAD S/P stents (08/2022), and S/P renal transplant (2019) who presents with chest pain and associated nausea and vomiting the past week.  Patient was seen in previous ER on 06/19/2023 with similar symptoms and workup was negative.  She was followed up with her cardiologist, , on 6/21/23 and scheduled for outpatient cardiac PET. Patient's most recent stress and echo were performed 04/2023.  Patient states she has been experiencing intermittent chest pain for 1 week and describes it as a heaviness.  Patient states she takes nitroglycerin daily and it will provide relief for a few minutes.  EKG showed normal sinus rhythm with no ST elevation or depression, troponin 0.007, AST 93, ALT 75, eGFR 42 (38.5 last week) and hemoglobin 11.7.  Chest x-ray showed no acute abnormality.  Patient was given nitro paste in ED with some relief.  Patient referred to Hospital Medicine and seen in the ED.  Patient reports chest pain continues to occur intermittently with no precipitating factors.  Patient also reports chest tenderness and states this is not new.  Patient reports vomiting earlier today and denies any further episodes.  Patient denies shortness of breath, nausea, diarrhea, fever and chills.  Patient will be admitted to Hospital Medicine for further evaluation and management.    Cardiology consult:  Seen and examined pt in the afternoon today. Pt presented  with chest pain. She had PCI to diagonal a year ago and had residual non obstructive CAD. She had recurrent chest pain recently. Had negative stress test in April and due to her ongoing chest pain her outpt cardiologist recommended stress PET however pt stated that its not scheduled till august and she presented to ED due to recurrent CP.  Patient stated that her chest pain is similar to what she had prior to her diagonal stent and partially improved with nitroglycerin.     Review of patient's allergies indicates:   Allergen Reactions    Dilaudid [hydromorphone] Itching    Morphine Itching    Torsemide Diarrhea     Diarrhea stopped once discontinued med    Codeine Itching     Can take oxycodone and hydrocodone with Benadryl       Past Medical History:   Diagnosis Date    Anemia     Anemia in chronic kidney disease, on chronic dialysis 7/12/2017    Anxiety and depression     Aplastic anemia with parvovirus B19 infection 5/27/2019    Aplastic anemia with parvovirus B19 infection 4/2019 5/27/2019    Arthritis     Asthma     allergic airway    CKD stage III (moderate) 2/18/2019    Colon polyp     Depression     Diabetes mellitus     Diverticulosis     Encounter for blood transfusion     Eosinophilia     ESRD (end stage renal disease)     stage V, due for living donor 9/2018    ESRD on dialysis     GERD (gastroesophageal reflux disease)     Gout     Gout     Hyperlipidemia     Hypertension     Hypertriglyceridemia without hypercholesterolemia 6/9/2019    Low back pain     Low HDL (under 40) 6/9/2019    MRSA carrier     Neutropenia, unspecified 5/8/2019    Obesity     Renal disorder     Rotator cuff syndrome--left     Thyroid disease     Ulnar neuropathy of right upper extremity     Uncontrolled type 2 diabetes mellitus with hyperglycemia      Past Surgical History:   Procedure Laterality Date    ACHILLES TENDON SURGERY Left 05/2015    BACK SURGERY      CERVICAL SPINE SURGERY  2021    cervical gate placed by Dr. Vera-  "screws,plate, and "gate"    CHOLECYSTECTOMY      COLONOSCOPY N/A 09/06/2017    Procedure: COLONOSCOPY;  Surgeon: Marisol Bryson MD;  Location: [facility] ENDO;  Service: Endoscopy;  Laterality: N/A; REPEAT IN 3 YEARS FOR SURVEILLANCE    COLONOSCOPY N/A 05/09/2019    Procedure: COLONOSCOPY;  Surgeon: Fady Ewing MD;  Location: [facility] ENDO (2ND FLR);  Service: Endoscopy;  Laterality: N/A;    CORONARY ANGIOGRAPHY  08/31/2022    Procedure: ANGIOGRAM, CORONARY ARTERY;  Surgeon: Christoph Dang MD;  Location: [facility];  Service: Cardiology;;    DIALYSIS FISTULA CREATION  12/2017    ESOPHAGOGASTRODUODENOSCOPY N/A 05/09/2019    Procedure: EGD (ESOPHAGOGASTRODUODENOSCOPY);  Surgeon: Fady Ewing MD;  Location: [facility] ENDO (2ND FLR);  Service: Endoscopy;  Laterality: N/A;    ESOPHAGOGASTRODUODENOSCOPY N/A 06/10/2021    Procedure: EGD (ESOPHAGOGASTRODUODENOSCOPY);  Surgeon: Marisol Bryson MD;  Location: [facility];  Service: Endoscopy;  Laterality: N/A;    ESOPHAGOGASTRODUODENOSCOPY N/A 5/31/2023    Procedure: EGD (ESOPHAGOGASTRODUODENOSCOPY);  Surgeon: Marisol Bryson MD;  Location: [facility];  Service: Endoscopy;  Laterality: N/A;    FRACTURE SURGERY  1990    Ankle knee    HEMORRHOID SURGERY      INJECTION OF ANESTHETIC AGENT AROUND MEDIAL BRANCH NERVES INNERVATING LUMBAR FACET JOINT Right 09/25/2019    Procedure: Block-nerve-medial branch-lumbar;  Surgeon: Yonny Ferrer MD;  Location: [facility] OR;  Service: Pain Management;  Laterality: Right;  L2, 3, 4,5     INJECTION OF ANESTHETIC AGENT AROUND MEDIAL BRANCH NERVES INNERVATING LUMBAR FACET JOINT Right 10/16/2019    Procedure: Block-nerve-medial branch-lumbar;  Surgeon: Yonny Ferrer MD;  Location: [facility] OR;  Service: Pain Management;  Laterality: Right;  L2, 3, 4,5     JOINT REPLACEMENT      left    KIDNEY TRANSPLANT N/A 01/14/2019    Procedure: TRANSPLANT, KIDNEY;  Surgeon: Roland Salazar MD;  Location: [facility] OR 2ND FLR;  Service: Transplant;  Laterality: N/A;    KNEE " SURGERY      RADIOFREQUENCY ABLATION OF LUMBAR MEDIAL BRANCH NERVE AT SINGLE LEVEL Right 10/29/2019    Procedure: Radiofrequency Ablation, Nerve, Spinal, Lumbar, Medial Branch, 1 Level;  Surgeon: Yonny Ferrer MD;  Location: [facility] OR;  Service: Pain Management;  Laterality: Right;  L2, 3, 4, 5  burned at 80 degrees C X 60 seconds each site X 2    SHOULDER ARTHROSCOPY      SHOULDER SURGERY      SPINE SURGERY  2020    Pain pump in back    UPPER GASTROINTESTINAL ENDOSCOPY  ~2013    Dr. Moralez     Social History     Tobacco Use    Smoking status: Never    Smokeless tobacco: Never   Substance Use Topics    Alcohol use: No     Alcohol/week: 0.0 standard drinks    Drug use: No        REVIEW OF SYSTEMS  All other systems negative except as mentioned in HPI.     OBJECTIVE     VITAL SIGNS (Most Recent)  Temp: 97.8 °F (36.6 °C) (07/09/23 1643)  Pulse: 63 (07/09/23 1643)  Resp: 16 (07/09/23 1643)  BP: 132/62 (07/09/23 1643)  SpO2: 95 % (07/09/23 1643)    VENTILATION STATUS  Resp: 16 (07/09/23 1643)  SpO2: 95 % (07/09/23 1643)           I & O (Last 24H):  Intake/Output Summary (Last 24 hours) at 7/9/2023 1828  Last data filed at 7/9/2023 0435  Gross per 24 hour   Intake 940 ml   Output 500 ml   Net 440 ml       WEIGHTS  Wt Readings from Last 1 Encounters:   07/09/23 0443 90 kg (198 lb 6.6 oz)   07/08/23 1112 90.6 kg (199 lb 11.2 oz)   07/07/23 1727 90.7 kg (200 lb)       PHYSICAL EXAM  GENERAL:  NAD  HEENT: Normocephalic. No pallor/icterus.   NECK: No JVD  CARDIAC: Regular rate and rhythm. S1 is normal.S2 is normal.  No murmurs.   LUNGS:  CTA b/l  ABDOMEN: Soft. Normal bowel sounds.    EXTREMITIES:  No edema/cyanosis.    CNS:  AAO x3, no focal deficits.   SKIN:  No rash.    PSYCH: normal affect    HOME MEDICATIONS:  No current facility-administered medications on file prior to encounter.     Current Outpatient Medications on File Prior to Encounter   Medication Sig Dispense Refill    acetaminophen (TYLENOL) 325 MG tablet Take 2  "tablets (650 mg total) by mouth every 8 (eight) hours as needed for Pain.  0    amLODIPine (NORVASC) 5 MG tablet Take 1 tablet by mouth once daily.      aspirin (ECOTRIN) 81 MG EC tablet Take 1 tablet (81 mg total) by mouth once daily. 30 tablet 0    BD LENIN 2ND GEN PEN NEEDLE 32 gauge x 5/32" Ndle Use up to four daily to inject novolog (Patient not taking: Reported on 6/26/2023) 400 each 3    blood sugar diagnostic Strp Check glucose 3 (three) times daily. (Patient not taking: Reported on 6/26/2023) 300 each PRN    blood-glucose sensor (DEXCOM G7 SENSOR) Amparo Change every 10 days (Patient not taking: Reported on 6/26/2023) 9 each 4    clopidogreL (PLAVIX) 75 mg tablet TAKE ONE TABLET BY MOUTH DAILY 90 tablet 3    colchicine (COLCRYS) 0.6 mg tablet Take 1 tablet (0.6 mg total) by mouth 2 (two) times daily. (Patient not taking: Reported on 6/26/2023) 180 tablet 3    DEXCOM G7  Misc Dispense 1 (Patient not taking: Reported on 6/26/2023) 1 each 0    doxazosin (CARDURA) 2 MG tablet Take 2 mg by mouth once daily.      DULoxetine (CYMBALTA) 30 MG capsule Take 30 mg by mouth once daily.      empagliflozin (JARDIANCE) 10 mg tablet Take 1 tablet (10 mg total) by mouth once daily. 30 tablet 3    famotidine (PEPCID) 40 MG tablet Take 1 tablet (40 mg total) by mouth every evening. 30 tablet 6    flash glucose sensor (FREESTYLE DAKOTAH 14 DAY SENSOR) Kit Use 1 sensor every 14 days to check blood glucose (Patient not taking: Reported on 6/26/2023) 6 kit 3    insulin aspart U-100 (NOVOLOG FLEXPEN U-100 INSULIN) 100 unit/mL (3 mL) InPn pen 32 u breakfast and supper, 28 u lunch + correction. Max TDD 140u 45 mL 11    insulin degludec (TRESIBA FLEXTOUCH U-200) 200 unit/mL (3 mL) insulin pen Inject 76 Units into the skin once daily. 12 pen 3    irbesartan (AVAPRO) 150 MG tablet Take 1 tablet by mouth once daily.      isosorbide mononitrate (IMDUR) 30 MG 24 hr tablet TAKE ONE TABLET BY MOUTH DAILY 90 tablet 3    lancets (ONETOUCH " ULTRASOFT LANCETS) Misc Use to test blood sugar 3 x's a day (Patient not taking: Reported on 6/26/2023) 300 each 3    lansoprazole (PREVACID) 30 MG capsule Take 1 capsule (30 mg total) by mouth 2 (two) times a day. 60 capsule 1    levothyroxine (SYNTHROID) 75 MCG tablet TAKE ONE TABLET BY MOUTH ONCE DAILY 90 tablet 3    LIDOcaine (LIDODERM) 5 % 1 patch.      LORazepam (ATIVAN) 1 MG tablet Take 1 mg by mouth every evening.      losartan (COZAAR) 50 MG tablet Take 1 tablet (50 mg total) by mouth once daily. 90 tablet 3    magnesium oxide 500 mg Tab Take 500 mg by mouth once daily.  0    metFORMIN (GLUCOPHAGE-XR) 500 MG ER 24hr tablet Take 1 tablet (500 mg total) by mouth 2 (two) times daily with meals. 180 tablet 1    metoprolol tartrate (LOPRESSOR) 25 MG tablet Take 0.5 tablets (12.5 mg total) by mouth 2 (two) times daily. 30 tablet 1    multivitamin (THERAGRAN) tablet Take 1 tablet by mouth once daily.      nitroGLYCERIN (NITROSTAT) 0.4 MG SL tablet Place 1 tablet (0.4 mg total) under the tongue every 5 (five) minutes as needed for Chest pain. (Patient not taking: Reported on 6/26/2023) 20 tablet 1    ondansetron (ZOFRAN) 4 MG tablet Take 1 tablet (4 mg total) by mouth every 6 (six) hours. (Patient not taking: Reported on 6/26/2023) 12 tablet 0    sucralfate (CARAFATE) 1 gram tablet Take 1 tablet (1 g total) by mouth every 6 (six) hours as needed (GERD). 40 tablet 1    tacrolimus (PROGRAF) 1 MG Cap Take 1 capsule (1 mg total) by mouth every 12 (twelve) hours. 60 capsule 11    tiZANidine (ZANAFLEX) 4 MG tablet Take 1 tablet by mouth every evening.      vilazodone (VIIBRYD) 40 mg Tab tablet Take 1 tablet (40 mg total) by mouth once daily. 30 tablet 4    VRAYLAR 3 mg Cap Take 3 mg by mouth once daily.         SCHEDULED MEDS:   aspirin  81 mg Oral Daily    clopidogreL  75 mg Oral Daily    famotidine  40 mg Oral Daily    isosorbide mononitrate  30 mg Oral Daily    levothyroxine  75 mcg Oral Daily    losartan  50 mg Oral  Daily    metoprolol tartrate  12.5 mg Oral BID    polyethylene glycol  17 g Oral BID    senna-docusate 8.6-50 mg  1 tablet Oral BID    tacrolimus  1 mg Oral BID    vilazodone  40 mg Oral Daily       CONTINUOUS INFUSIONS:   sodium chloride 0.9% 100 mL/hr at 07/09/23 1544       PRN MEDS:acetaminophen, aluminum-magnesium hydroxide-simethicone, bisacodyL, dextrose 10%, dextrose 10%, glucagon (human recombinant), glucose, glucose, insulin aspart U-100, LORazepam, melatonin, naloxone, nitroGLYCERIN, ondansetron, prochlorperazine, simethicone, sodium chloride 0.9%    LABS AND DIAGNOSTICS     CBC LAST 3 DAYS  Recent Labs   Lab 07/07/23 2015 07/08/23 0502 07/09/23 0438   WBC 8.94 7.10 6.45   RBC 4.36 3.93* 4.17   HGB 11.7* 10.8* 11.3*   HCT 38.1 34.6* 36.7*   MCV 87 88 88   MCH 26.8* 27.5 27.1   MCHC 30.7* 31.2* 30.8*   RDW 15.6* 15.7* 15.7*    147* 152   MPV 10.1 9.9 10.4   GRAN 65.1  5.8 48.7  3.5 53.0  3.4   LYMPH 18.5  1.7 30.8  2.2 25.4  1.6   MONO 7.6  0.7 9.0  0.6 8.7  0.6   BASO 0.05 0.05 0.06   NRBC 0 0 0       COAGULATION LAST 3 DAYS  No results for input(s): LABPT, INR, APTT in the last 168 hours.    CHEMISTRY LAST 3 DAYS  Recent Labs   Lab 07/07/23 2015 07/08/23 0502 07/09/23 0438    143 140   K 4.9 4.5 4.1    103 105   CO2 28 29 24   ANIONGAP 14 11 11   BUN 24* 23 20   CREATININE 1.4 1.3 1.1    108 140*   CALCIUM 10.2 9.4 9.3   MG  --  1.7 1.8   ALBUMIN 4.3 3.9 3.9   PROT 8.2 7.4 7.4   ALKPHOS 128 113 107   ALT 75* 64* 70*   AST 93* 70* 77*   BILITOT 0.6 0.5 0.7       CARDIAC PROFILE LAST 3 DAYS  Recent Labs   Lab 07/07/23 2015 07/07/23  2131 07/08/23  0228 07/08/23  0502   BNP 18  --   --   --    TROPONINI 0.007 0.007 0.016 0.006       ENDOCRINE LAST 3 DAYS  Recent Labs   Lab 07/08/23  1433   TSH 0.859       LAST ARTERIAL BLOOD GAS  ABG  No results for input(s): PH, PO2, PCO2, HCO3, BE in the last 168 hours.    LAST 7 DAYS MICROBIOLOGY   Microbiology Results (last 7  days)       ** No results found for the last 168 hours. **            MOST RECENT IMAGING  X-Ray Chest AP Portable  Narrative: EXAMINATION:  XR CHEST AP PORTABLE    CLINICAL HISTORY:  Chest Pain;    TECHNIQUE:  Single frontal view of the chest was performed.    COMPARISON:  06/19/2023, 04/10/2023, 09/13/2022    FINDINGS:  Cardiomediastinal silhouette is stable and not significantly enlarged.  There is no evidence of acute lung parenchymal or pleural abnormality.  Degenerative changes of the spine and shoulders as well as postoperative changes of the cervical spine with hardware again noted.  Impression: No acute or significant focal abnormality involving the chest.    Electronically signed by: Hien Malik  Date:    07/07/2023  Time:    19:50      ECHOCARDIOGRAM RESULTS (last 5)  Results for orders placed in visit on 04/05/23    Echo    Interpretation Summary  · The left ventricle is normal in size with concentric hypertrophy and normal systolic function.  · The estimated ejection fraction is 60%.  · Normal left ventricular diastolic function.  · Normal right ventricular size with normal right ventricular systolic function.  · There is mild aortic valve stenosis.  · Aortic valve area is 1.70 cm2; peak velocity is 2.81 m/s; mean gradient is 17 mmHg.  · Normal central venous pressure (3 mmHg).      Results for orders placed during the hospital encounter of 09/02/22    Echo    Interpretation Summary  · The left ventricle is normal in size with concentric hypertrophy and normal systolic function.  · The estimated ejection fraction is 60%.  · Normal right ventricular size with normal right ventricular systolic function.      Results for orders placed during the hospital encounter of 08/30/22    Echo Saline Bubble? No    Interpretation Summary  · The left ventricle is normal in size with mild concentric hypertrophy and normal systolic function.  · The estimated ejection fraction is 60%.  · Indeterminate left  ventricular diastolic function.  · Normal right ventricular size with normal right ventricular systolic function.  · Moderate left atrial enlargement.  · Mild aortic regurgitation.  · Normal central venous pressure (3 mmHg).  · The estimated PA systolic pressure is 25 mmHg.      Results for orders placed during the hospital encounter of 06/02/21    Echo Saline Bubble? Yes    Interpretation Summary  · The left ventricle is normal in size with normal systolic function.  · The estimated ejection fraction is 70%.  · Indeterminate left ventricular diastolic function.  · Normal right ventricular size with normal right ventricular systolic function.  · Mild aortic regurgitation.  · No interatrial septal defect present.      CURRENT/PREVIOUS VISIT EKG  Results for orders placed or performed in visit on 06/21/23   IN OFFICE EKG 12-LEAD (to [place])    Collection Time: 06/21/23  9:22 AM    Narrative    Test Reason : z00.0    Vent. Rate : 073 BPM     Atrial Rate : 073 BPM     P-R Int : 164 ms          QRS Dur : 100 ms      QT Int : 402 ms       P-R-T Axes : 037 -62 077 degrees     QTc Int : 442 ms    Normal sinus rhythm  Incomplete right bundle branch block  LVH with repolarization abnormality ( R in aVL , Wilder product ,   Romhilt-[place] )  Abnormal ECG  When compared with ECG of 19-JUN-2023 15:58,  Incomplete right bundle branch block is now Present  Confirmed by Debbi MARTINEZ, Bryant AGUIAR ([id number]) on 6/21/2023 1:26:05 PM    Referred By:             Confirmed By:Bryant Werner MD       Telemetry: no events     ASSESSMENT/PLAN:     Active Hospital Problems    Diagnosis    *Chest pain    Hypertension associated with stage 3 chronic kidney disease due to type 2 diabetes mellitus    Acquired hypothyroidism    Kidney transplant recipient     Left kidney transplant 1/14/2019      Gastroesophageal reflux disease without esophagitis    Anemia of chronic disease     Hgb stable. No overt blood loss  Monitor  Transfuse > 7/28      Mixed  hyperlipidemia    Bilateral low back pain without sciatica    MDD (major depressive disorder), recurrent episode, mild    Controlled type 2 diabetes mellitus with complication, with long-term current use of insulin       ASSESSMENT & PLAN:   Recurrent CP  CAD s/p PCI of diagonal 1 year ago  Renal transplant  H/o pericarditis   DM/HTN  DLD- statin stopped recently due to elevated LFTs    She has recurrent chest pain concerning for worsening/unstable angina  CP had some features of pericarditis however ESR mildly elevated (similar to prior), CRP negative Troponins negative  No ischemic changes on EKG.   limited echo to reevaluate LV pending   Discussed with pt regarding the coronary angiogram for further ischemia work up as she had negative stress test recently and has recurrent chest pain episodes which are similar to her CP prior to PCI last year. Risks and benefits of angiogram and PCI discussed with patient including risk of contrast induced nephropathy and risk of dialysis and patient wants to proceed with angiogram to r/o occlusive CAD.   Will schedule for angiogram with Dr. Hunter tomorrow. Will try to minimize contrast use.   Appreciate nephrology input   IV hydration pre and post procedure  Discussed with medicine team    Vira Love MD  [facility]  Department of Cardiology  Date of Service: 07/09/2023

## 2023-07-09 NOTE — SUBJECTIVE & OBJECTIVE
Interval History:  Patient seen and examined.  Significant other at bedside.  Patient reports continuing to of chest pain.  Still with nausea.  Discussed results and plan of care.  They understand that transfer to [facility] for angio tomorrow may be recommended by Cardiology.  Review of Systems   Constitutional:  Negative for activity change, appetite change, chills and fever.   HENT:  Negative for congestion, sore throat and trouble swallowing.    Eyes:  Negative for photophobia and visual disturbance.   Respiratory:  Negative for cough, chest tightness and shortness of breath.    Cardiovascular:  Positive for chest pain. Negative for palpitations and leg swelling.   Gastrointestinal:  Positive for nausea. Negative for abdominal pain, diarrhea and vomiting.   Genitourinary:  Negative for dysuria, flank pain and hematuria.   Musculoskeletal:  Negative for back pain.   Neurological:  Negative for dizziness, weakness and headaches.   Psychiatric/Behavioral:  Negative for confusion.    Objective:     Vital Signs (Most Recent):  Temp: 97.4 °F (36.3 °C) (07/09/23 0725)  Pulse: 72 (07/09/23 0725)  Resp: 18 (07/09/23 0725)  BP: (!) 140/75 (07/09/23 0725)  SpO2: 95 % (07/09/23 0725) Vital Signs (24h Range):  Temp:  [97.4 °F (36.3 °C)-98.5 °F (36.9 °C)] 97.4 °F (36.3 °C)  Pulse:  [58-72] 72  Resp:  [16-18] 18  SpO2:  [94 %-97 %] 95 %  BP: (140-218)/(67-88) 140/75     Weight: 90 kg (198 lb 6.6 oz)  Body mass index is 31.08 kg/m².    Intake/Output Summary (Last 24 hours) at 7/9/2023 1058  Last data filed at 7/9/2023 0435  Gross per 24 hour   Intake 940 ml   Output 500 ml   Net 440 ml         Physical Exam  Vitals reviewed.   Constitutional:       Appearance: Normal appearance. She is obese.   HENT:      Head: Normocephalic.      Mouth/Throat:      Mouth: Mucous membranes are moist.      Pharynx: Oropharynx is clear.   Eyes:      Pupils: Pupils are equal, round, and reactive to light.   Cardiovascular:      Rate and Rhythm: Normal  rate and regular rhythm.      Pulses: Normal pulses.      Heart sounds: Murmur heard.   Pulmonary:      Effort: Pulmonary effort is normal.      Breath sounds: Normal breath sounds.   Abdominal:      General: Bowel sounds are normal.      Palpations: Abdomen is soft.   Musculoskeletal:         General: Normal range of motion.      Cervical back: Normal range of motion and neck supple.   Skin:     General: Skin is warm and dry.   Neurological:      Mental Status: She is alert and oriented to person, place, and time. Mental status is at baseline.   Psychiatric:         Mood and Affect: Mood normal.         Speech: Speech normal.         Behavior: Behavior normal.           Significant Labs: All pertinent labs within the past 24 hours have been reviewed.    Significant Imaging: I have reviewed all pertinent imaging results/findings within the past 24 hours.

## 2023-07-09 NOTE — CONSULTS
[facility]  Department of Cardiology  Consult Note      PATIENT NAME: Andra Newell    MRN: [id number]  TODAY'S DATE: 07/08/2023  ADMIT DATE: 7/7/2023                          CONSULT REQUESTED BY: Komal Marte MD    SUBJECTIVE     PRINCIPAL PROBLEM: Chest pain      REASON FOR CONSULT:  Chest pain       Admission HPI:  Andra Newell is a 63-year-old female with a past medical history of anemia, arthritis, DM type 2, depression, CAD S/P stents (08/2022), and S/P renal transplant (2019) who presents with chest pain and associated nausea and vomiting the past week.  Patient was seen in previous ER on 06/19/2023 with similar symptoms and workup was negative.  She was followed up with her cardiologist, , on 6/21/23 and scheduled for outpatient cardiac PET. Patient's most recent stress and echo were performed 04/2023.  Patient states she has been experiencing intermittent chest pain for 1 week and describes it as a heaviness.  Patient states she takes nitroglycerin daily and it will provide relief for a few minutes.  EKG showed normal sinus rhythm with no ST elevation or depression, troponin 0.007, AST 93, ALT 75, eGFR 42 (38.5 last week) and hemoglobin 11.7.  Chest x-ray showed no acute abnormality.  Patient was given nitro paste in ED with some relief.  Patient referred to Hospital Medicine and seen in the ED.  Patient reports chest pain continues to occur intermittently with no precipitating factors.  Patient also reports chest tenderness and states this is not new.  Patient reports vomiting earlier today and denies any further episodes.  Patient denies shortness of breath, nausea, diarrhea, fever and chills.  Patient will be admitted to Hospital Medicine for further evaluation and management.    Cardiology consult:  Seen and examined pt in the afternoon today. Pt presented with chest pain. She had PCI to diagonal a year ago and had residual non obstructive CAD. She had  "recurrent chest pain recently. Had negative stress test in April and due to her ongoing chest pain her outpt cardiologist recommended stress PET however pt stated that its not scheduled till august and she presented to ED due to recurrent CP.  Patient stated that her chest pain is similar to what she had prior to her diagonal stent and partially improved with nitroglycerin.     Review of patient's allergies indicates:   Allergen Reactions    Dilaudid [hydromorphone] Itching    Morphine Itching    Torsemide Diarrhea     Diarrhea stopped once discontinued med    Codeine Itching     Can take oxycodone and hydrocodone with Benadryl       Past Medical History:   Diagnosis Date    Anemia     Anemia in chronic kidney disease, on chronic dialysis 7/12/2017    Anxiety and depression     Aplastic anemia with parvovirus B19 infection 5/27/2019    Aplastic anemia with parvovirus B19 infection 4/2019 5/27/2019    Arthritis     Asthma     allergic airway    CKD stage III (moderate) 2/18/2019    Colon polyp     Depression     Diabetes mellitus     Diverticulosis     Encounter for blood transfusion     Eosinophilia     ESRD (end stage renal disease)     stage V, due for living donor 9/2018    ESRD on dialysis     GERD (gastroesophageal reflux disease)     Gout     Gout     Hyperlipidemia     Hypertension     Hypertriglyceridemia without hypercholesterolemia 6/9/2019    Low back pain     Low HDL (under 40) 6/9/2019    MRSA carrier     Neutropenia, unspecified 5/8/2019    Obesity     Renal disorder     Rotator cuff syndrome--left     Thyroid disease     Ulnar neuropathy of right upper extremity     Uncontrolled type 2 diabetes mellitus with hyperglycemia      Past Surgical History:   Procedure Laterality Date    ACHILLES TENDON SURGERY Left 05/2015    BACK SURGERY      CERVICAL SPINE SURGERY  2021    cervical gate placed by Dr. Vera- screws,plate, and "gate"    CHOLECYSTECTOMY      COLONOSCOPY N/A 09/06/2017    Procedure: " COLONOSCOPY;  Surgeon: Marisol Bryson MD;  Location: [facility];  Service: Endoscopy;  Laterality: N/A; REPEAT IN 3 YEARS FOR SURVEILLANCE    COLONOSCOPY N/A 05/09/2019    Procedure: COLONOSCOPY;  Surgeon: Fady Ewing MD;  Location: [facility] ENDO (2ND FLR);  Service: Endoscopy;  Laterality: N/A;    CORONARY ANGIOGRAPHY  08/31/2022    Procedure: ANGIOGRAM, CORONARY ARTERY;  Surgeon: Christoph Dang MD;  Location: [facility];  Service: Cardiology;;    DIALYSIS FISTULA CREATION  12/2017    ESOPHAGOGASTRODUODENOSCOPY N/A 05/09/2019    Procedure: EGD (ESOPHAGOGASTRODUODENOSCOPY);  Surgeon: Fady Ewing MD;  Location: [facility] ENDO (2ND FLR);  Service: Endoscopy;  Laterality: N/A;    ESOPHAGOGASTRODUODENOSCOPY N/A 06/10/2021    Procedure: EGD (ESOPHAGOGASTRODUODENOSCOPY);  Surgeon: Marisol Bryson MD;  Location: [facility] ENDO;  Service: Endoscopy;  Laterality: N/A;    ESOPHAGOGASTRODUODENOSCOPY N/A 5/31/2023    Procedure: EGD (ESOPHAGOGASTRODUODENOSCOPY);  Surgeon: Marisol Bryson MD;  Location: [facility];  Service: Endoscopy;  Laterality: N/A;    FRACTURE SURGERY  1990    Ankle knee    HEMORRHOID SURGERY      INJECTION OF ANESTHETIC AGENT AROUND MEDIAL BRANCH NERVES INNERVATING LUMBAR FACET JOINT Right 09/25/2019    Procedure: Block-nerve-medial branch-lumbar;  Surgeon: Yonny Ferrer MD;  Location: [facility] OR;  Service: Pain Management;  Laterality: Right;  L2, 3, 4,5     INJECTION OF ANESTHETIC AGENT AROUND MEDIAL BRANCH NERVES INNERVATING LUMBAR FACET JOINT Right 10/16/2019    Procedure: Block-nerve-medial branch-lumbar;  Surgeon: Yonny Ferrer MD;  Location: [facility] OR;  Service: Pain Management;  Laterality: Right;  L2, 3, 4,5     JOINT REPLACEMENT      left    KIDNEY TRANSPLANT N/A 01/14/2019    Procedure: TRANSPLANT, KIDNEY;  Surgeon: Roland Salazar MD;  Location: [facility] OR 2ND FLR;  Service: Transplant;  Laterality: N/A;    KNEE SURGERY      RADIOFREQUENCY ABLATION OF LUMBAR MEDIAL BRANCH NERVE AT SINGLE LEVEL Right  10/29/2019    Procedure: Radiofrequency Ablation, Nerve, Spinal, Lumbar, Medial Branch, 1 Level;  Surgeon: Yonny Ferrer MD;  Location: [facility];  Service: Pain Management;  Laterality: Right;  L2, 3, 4, 5  burned at 80 degrees C X 60 seconds each site X 2    SHOULDER ARTHROSCOPY      SHOULDER SURGERY      SPINE SURGERY  2020    Pain pump in back    UPPER GASTROINTESTINAL ENDOSCOPY  ~2013    Dr. Moralez     Social History     Tobacco Use    Smoking status: Never    Smokeless tobacco: Never   Substance Use Topics    Alcohol use: No     Alcohol/week: 0.0 standard drinks    Drug use: No        REVIEW OF SYSTEMS  All other systems negative except as mentioned in HPI.     OBJECTIVE     VITAL SIGNS (Most Recent)  Temp: 98 °F (36.7 °C) (07/08/23 1936)  Pulse: 65 (07/08/23 2015)  Resp: 17 (07/08/23 2015)  BP: (!) 183/78 (07/08/23 1936)  SpO2: (!) 94 % (07/08/23 2015)    VENTILATION STATUS  Resp: 17 (07/08/23 2015)  SpO2: (!) 94 % (07/08/23 2015)           I & O (Last 24H):  Intake/Output Summary (Last 24 hours) at 7/8/2023 2157  Last data filed at 7/8/2023 1850  Gross per 24 hour   Intake 700 ml   Output --   Net 700 ml       WEIGHTS  Wt Readings from Last 1 Encounters:   07/08/23 1112 90.6 kg (199 lb 11.2 oz)   07/07/23 1727 90.7 kg (200 lb)       PHYSICAL EXAM  GENERAL:  NAD  HEENT: Normocephalic. No pallor/icterus.   NECK: No JVD  CARDIAC: Regular rate and rhythm. S1 is normal.S2 is normal.  No murmurs.   LUNGS:  CTA b/l  ABDOMEN: Soft. Normal bowel sounds.    EXTREMITIES:  No edema/cyanosis.    CNS:  AAO x3, no focal deficits.   SKIN:  No rash.    PSYCH: normal affect    HOME MEDICATIONS:  No current facility-administered medications on file prior to encounter.     Current Outpatient Medications on File Prior to Encounter   Medication Sig Dispense Refill    acetaminophen (TYLENOL) 325 MG tablet Take 2 tablets (650 mg total) by mouth every 8 (eight) hours as needed for Pain.  0    amLODIPine (NORVASC) 5 MG tablet Take 1  "tablet by mouth once daily.      aspirin (ECOTRIN) 81 MG EC tablet Take 1 tablet (81 mg total) by mouth once daily. 30 tablet 0    BD LENIN 2ND GEN PEN NEEDLE 32 gauge x 5/32" Ndle Use up to four daily to inject novolog (Patient not taking: Reported on 6/26/2023) 400 each 3    blood sugar diagnostic Strp Check glucose 3 (three) times daily. (Patient not taking: Reported on 6/26/2023) 300 each PRN    blood-glucose sensor (DEXCOM G7 SENSOR) Amparo Change every 10 days (Patient not taking: Reported on 6/26/2023) 9 each 4    clopidogreL (PLAVIX) 75 mg tablet TAKE ONE TABLET BY MOUTH DAILY 90 tablet 3    colchicine (COLCRYS) 0.6 mg tablet Take 1 tablet (0.6 mg total) by mouth 2 (two) times daily. (Patient not taking: Reported on 6/26/2023) 180 tablet 3    DEXCOM G7  Misc Dispense 1 (Patient not taking: Reported on 6/26/2023) 1 each 0    doxazosin (CARDURA) 2 MG tablet Take 2 mg by mouth once daily.      DULoxetine (CYMBALTA) 30 MG capsule Take 30 mg by mouth once daily.      empagliflozin (JARDIANCE) 10 mg tablet Take 1 tablet (10 mg total) by mouth once daily. 30 tablet 3    famotidine (PEPCID) 40 MG tablet Take 1 tablet (40 mg total) by mouth every evening. 30 tablet 6    flash glucose sensor (FREESTYLE DAKOTAH 14 DAY SENSOR) Kit Use 1 sensor every 14 days to check blood glucose (Patient not taking: Reported on 6/26/2023) 6 kit 3    insulin aspart U-100 (NOVOLOG FLEXPEN U-100 INSULIN) 100 unit/mL (3 mL) InPn pen 32 u breakfast and supper, 28 u lunch + correction. Max TDD 140u 45 mL 11    insulin degludec (TRESIBA FLEXTOUCH U-200) 200 unit/mL (3 mL) insulin pen Inject 76 Units into the skin once daily. 12 pen 3    irbesartan (AVAPRO) 150 MG tablet Take 1 tablet by mouth once daily.      isosorbide mononitrate (IMDUR) 30 MG 24 hr tablet TAKE ONE TABLET BY MOUTH DAILY 90 tablet 3    lancets (ONETOUCH ULTRASOFT LANCETS) Misc Use to test blood sugar 3 x's a day (Patient not taking: Reported on 6/26/2023) 300 each 3    " lansoprazole (PREVACID) 30 MG capsule Take 1 capsule (30 mg total) by mouth 2 (two) times a day. 60 capsule 1    levothyroxine (SYNTHROID) 75 MCG tablet TAKE ONE TABLET BY MOUTH ONCE DAILY 90 tablet 3    LIDOcaine (LIDODERM) 5 % 1 patch.      LORazepam (ATIVAN) 1 MG tablet Take 1 mg by mouth every evening.      losartan (COZAAR) 50 MG tablet Take 1 tablet (50 mg total) by mouth once daily. 90 tablet 3    magnesium oxide 500 mg Tab Take 500 mg by mouth once daily.  0    metFORMIN (GLUCOPHAGE-XR) 500 MG ER 24hr tablet Take 1 tablet (500 mg total) by mouth 2 (two) times daily with meals. 180 tablet 1    metoprolol tartrate (LOPRESSOR) 25 MG tablet Take 0.5 tablets (12.5 mg total) by mouth 2 (two) times daily. 30 tablet 1    multivitamin (THERAGRAN) tablet Take 1 tablet by mouth once daily.      nitroGLYCERIN (NITROSTAT) 0.4 MG SL tablet Place 1 tablet (0.4 mg total) under the tongue every 5 (five) minutes as needed for Chest pain. (Patient not taking: Reported on 6/26/2023) 20 tablet 1    ondansetron (ZOFRAN) 4 MG tablet Take 1 tablet (4 mg total) by mouth every 6 (six) hours. (Patient not taking: Reported on 6/26/2023) 12 tablet 0    sucralfate (CARAFATE) 1 gram tablet Take 1 tablet (1 g total) by mouth every 6 (six) hours as needed (GERD). 40 tablet 1    tacrolimus (PROGRAF) 1 MG Cap Take 1 capsule (1 mg total) by mouth every 12 (twelve) hours. 60 capsule 11    tiZANidine (ZANAFLEX) 4 MG tablet Take 1 tablet by mouth every evening.      vilazodone (VIIBRYD) 40 mg Tab tablet Take 1 tablet (40 mg total) by mouth once daily. 30 tablet 4    VRAYLAR 3 mg Cap Take 3 mg by mouth once daily.         SCHEDULED MEDS:   aspirin  81 mg Oral Daily    clopidogreL  75 mg Oral Daily    famotidine  40 mg Oral Daily    isosorbide mononitrate  30 mg Oral Daily    levothyroxine  75 mcg Oral Daily    losartan  50 mg Oral Daily    metoprolol tartrate  12.5 mg Oral BID    senna-docusate 8.6-50 mg  1 tablet Oral BID    tacrolimus  1 mg Oral  BID    vilazodone  40 mg Oral Daily       CONTINUOUS INFUSIONS:    PRN MEDS:acetaminophen, aluminum-magnesium hydroxide-simethicone, dextrose 10%, dextrose 10%, glucagon (human recombinant), glucose, glucose, insulin aspart U-100, LORazepam, melatonin, naloxone, nitroGLYCERIN, ondansetron, prochlorperazine, simethicone, sodium chloride 0.9%    LABS AND DIAGNOSTICS     CBC LAST 3 DAYS  Recent Labs   Lab 07/07/23 2015 07/08/23  0502   WBC 8.94 7.10   RBC 4.36 3.93*   HGB 11.7* 10.8*   HCT 38.1 34.6*   MCV 87 88   MCH 26.8* 27.5   MCHC 30.7* 31.2*   RDW 15.6* 15.7*    147*   MPV 10.1 9.9   GRAN 65.1  5.8 48.7  3.5   LYMPH 18.5  1.7 30.8  2.2   MONO 7.6  0.7 9.0  0.6   BASO 0.05 0.05   NRBC 0 0       COAGULATION LAST 3 DAYS  No results for input(s): LABPT, INR, APTT in the last 168 hours.    CHEMISTRY LAST 3 DAYS  Recent Labs   Lab 07/07/23 2015 07/08/23  0502    143   K 4.9 4.5    103   CO2 28 29   ANIONGAP 14 11   BUN 24* 23   CREATININE 1.4 1.3    108   CALCIUM 10.2 9.4   MG  --  1.7   ALBUMIN 4.3 3.9   PROT 8.2 7.4   ALKPHOS 128 113   ALT 75* 64*   AST 93* 70*   BILITOT 0.6 0.5       CARDIAC PROFILE LAST 3 DAYS  Recent Labs   Lab 07/07/23 2015 07/07/23  2131 07/08/23  0228 07/08/23  0502   BNP 18  --   --   --    TROPONINI 0.007 0.007 0.016 0.006       ENDOCRINE LAST 3 DAYS  Recent Labs   Lab 07/08/23  1433   TSH 0.859       LAST ARTERIAL BLOOD GAS  ABG  No results for input(s): PH, PO2, PCO2, HCO3, BE in the last 168 hours.    LAST 7 DAYS MICROBIOLOGY   Microbiology Results (last 7 days)       ** No results found for the last 168 hours. **            MOST RECENT IMAGING  X-Ray Chest AP Portable  Narrative: EXAMINATION:  XR CHEST AP PORTABLE    CLINICAL HISTORY:  Chest Pain;    TECHNIQUE:  Single frontal view of the chest was performed.    COMPARISON:  06/19/2023, 04/10/2023, 09/13/2022    FINDINGS:  Cardiomediastinal silhouette is stable and not significantly enlarged.  There is  no evidence of acute lung parenchymal or pleural abnormality.  Degenerative changes of the spine and shoulders as well as postoperative changes of the cervical spine with hardware again noted.  Impression: No acute or significant focal abnormality involving the chest.    Electronically signed by: Hien Malik  Date:    07/07/2023  Time:    19:50      ECHOCARDIOGRAM RESULTS (last 5)  Results for orders placed in visit on 04/05/23    Echo    Interpretation Summary  · The left ventricle is normal in size with concentric hypertrophy and normal systolic function.  · The estimated ejection fraction is 60%.  · Normal left ventricular diastolic function.  · Normal right ventricular size with normal right ventricular systolic function.  · There is mild aortic valve stenosis.  · Aortic valve area is 1.70 cm2; peak velocity is 2.81 m/s; mean gradient is 17 mmHg.  · Normal central venous pressure (3 mmHg).      Results for orders placed during the hospital encounter of 09/02/22    Echo    Interpretation Summary  · The left ventricle is normal in size with concentric hypertrophy and normal systolic function.  · The estimated ejection fraction is 60%.  · Normal right ventricular size with normal right ventricular systolic function.      Results for orders placed during the hospital encounter of 08/30/22    Echo Saline Bubble? No    Interpretation Summary  · The left ventricle is normal in size with mild concentric hypertrophy and normal systolic function.  · The estimated ejection fraction is 60%.  · Indeterminate left ventricular diastolic function.  · Normal right ventricular size with normal right ventricular systolic function.  · Moderate left atrial enlargement.  · Mild aortic regurgitation.  · Normal central venous pressure (3 mmHg).  · The estimated PA systolic pressure is 25 mmHg.      Results for orders placed during the hospital encounter of 06/02/21    Echo Saline Bubble? Yes    Interpretation Summary  · The left  ventricle is normal in size with normal systolic function.  · The estimated ejection fraction is 70%.  · Indeterminate left ventricular diastolic function.  · Normal right ventricular size with normal right ventricular systolic function.  · Mild aortic regurgitation.  · No interatrial septal defect present.      CURRENT/PREVIOUS VISIT EKG  Results for orders placed or performed in visit on 06/21/23   IN OFFICE EKG 12-LEAD (to [place])    Collection Time: 06/21/23  9:22 AM    Narrative    Test Reason : z00.0    Vent. Rate : 073 BPM     Atrial Rate : 073 BPM     P-R Int : 164 ms          QRS Dur : 100 ms      QT Int : 402 ms       P-R-T Axes : 037 -62 077 degrees     QTc Int : 442 ms    Normal sinus rhythm  Incomplete right bundle branch block  LVH with repolarization abnormality ( R in aVL , [place] product ,   [place] )  Abnormal ECG  When compared with ECG of 19-JUN-2023 15:58,  Incomplete right bundle branch block is now Present  Confirmed by Debbi MARTINEZ, Bryant AGUIAR ([id number]) on 6/21/2023 1:26:05 PM    Referred By:             Confirmed By:Bryant Werner MD       Telemetry: no events     ASSESSMENT/PLAN:     Active Hospital Problems    Diagnosis    *Chest pain    Hypertension associated with stage 3 chronic kidney disease due to type 2 diabetes mellitus    Acquired hypothyroidism    Kidney transplant recipient     Left kidney transplant 1/14/2019      Gastroesophageal reflux disease without esophagitis    Anemia of chronic disease     Hgb stable. No overt blood loss  Monitor  Transfuse > 7/28      Mixed hyperlipidemia    Bilateral low back pain without sciatica    MDD (major depressive disorder), recurrent episode, mild    Controlled type 2 diabetes mellitus with complication, with long-term current use of insulin       ASSESSMENT & PLAN:   Recurrent CP  CAD s/p PCI of diagonal 1 year ago  Renal transplant  H/o pericarditis   DM/HTN/DLD    She has recurrent chest pain concerning for angina however her chest pain  also has some features of pericarditis (slightly worse with deep breathing and somewhat better with sitting up)   Troponins negative  No ischemic changes on EKG  Continue current medication  Plan is to obtain ESR, CRP and a limited echo to reevaluate LV  will consider angiogram on Monday  Discussed with medicine team    Vira Love MD  [facility]  Department of Cardiology  Date of Service: 07/08/2023  9:57 PM

## 2023-07-09 NOTE — CONSULTS
Nephrology Consult Note        Patient Name: Andra Newell  MRN: [id number]    Patient Class: OP- Observation   Admission Date: 7/7/2023  Length of Stay: 0 days  Date of Service: 7/9/2023    Attending Physician: Komal Marte MD  Primary Care Provider: Fady Vaughn MD    Reason for Consult: kidney transplant patient    SUBJECTIVE:     HPI: 63F with a past medical history of anemia, arthritis, DM type 2, depression, CAD with stents (08/2022), and history renal transplant (2019) and previously followed by Dr. Heaton for years, now seen recently as new patient by Dr. Adolfo Steel after patient moved to [place] who presents with chest pain and associated nausea and vomiting in the past week.  Patient was seen in previous ER on 06/19/2023 with similar symptoms and workup was negative. She followed up with her cardiologist, Dr. Werner and was scheduled for outpatient cardiac PET. Patient's most recent stress and echo were performed 04/2023.  Patient states she has been experiencing intermittent chest pain for 1 week and describes it as a heaviness. Patient states she takes nitroglycerin daily and it will provide relief for a few minutes. EKG showed normal sinus rhythm with no ST elevation or depression, troponin 0.007, AST 93, ALT 75, eGFR 42 (38.5 last week) and hemoglobin 11.7. Chest x-ray showed no acute abnormality. Patient was given nitro paste in ED with some relief. Patient reports chest pain continues to occur intermittently with no precipitating factors. She is receiving IVF in preparation for cath tomorrow AM and will be moved to [facility] today for close observation.    Past Medical History:   Diagnosis Date    Anemia     Anemia in chronic kidney disease, on chronic dialysis 7/12/2017    Anxiety and depression     Aplastic anemia with parvovirus B19 infection 5/27/2019    Aplastic anemia with parvovirus B19 infection 4/2019 5/27/2019    Arthritis     Asthma     allergic airway    CKD stage III  "(moderate) 2/18/2019    Colon polyp     Depression     Diabetes mellitus     Diverticulosis     Encounter for blood transfusion     Eosinophilia     ESRD (end stage renal disease)     stage V, due for living donor 9/2018    ESRD on dialysis     GERD (gastroesophageal reflux disease)     Gout     Gout     Hyperlipidemia     Hypertension     Hypertriglyceridemia without hypercholesterolemia 6/9/2019    Low back pain     Low HDL (under 40) 6/9/2019    MRSA carrier     Neutropenia, unspecified 5/8/2019    Obesity     Renal disorder     Rotator cuff syndrome--left     Thyroid disease     Ulnar neuropathy of right upper extremity     Uncontrolled type 2 diabetes mellitus with hyperglycemia      Past Surgical History:   Procedure Laterality Date    ACHILLES TENDON SURGERY Left 05/2015    BACK SURGERY      CERVICAL SPINE SURGERY  2021    cervical gate placed by Dr. Vera- screws,plate, and "gate"    CHOLECYSTECTOMY      COLONOSCOPY N/A 09/06/2017    Procedure: COLONOSCOPY;  Surgeon: Marisol Bryson MD;  Location: [facility];  Service: Endoscopy;  Laterality: N/A; REPEAT IN 3 YEARS FOR SURVEILLANCE    COLONOSCOPY N/A 05/09/2019    Procedure: COLONOSCOPY;  Surgeon: Fady Ewing MD;  [facility]  Service: Endoscopy;  Laterality: N/A;    CORONARY ANGIOGRAPHY  08/31/2022    Procedure: ANGIOGRAM, CORONARY ARTERY;  Surgeon: Christoph Dang MD;  [facility];  Service: Cardiology;;    DIALYSIS FISTULA CREATION  12/2017    ESOPHAGOGASTRODUODENOSCOPY N/A 05/09/2019    Procedure: EGD (ESOPHAGOGASTRODUODENOSCOPY);  Surgeon: Fady Ewing MD;  [facility]  Service: Endoscopy;  Laterality: N/A;    ESOPHAGOGASTRODUODENOSCOPY N/A 06/10/2021    Procedure: EGD (ESOPHAGOGASTRODUODENOSCOPY);  Surgeon: Marisol Bryson MD;  Location: [facility];  Service: Endoscopy;  Laterality: N/A;    ESOPHAGOGASTRODUODENOSCOPY N/A 5/31/2023    Procedure: EGD (ESOPHAGOGASTRODUODENOSCOPY);  Surgeon: " Marisol Bryson MD;  Location: [facility] ENDO;  Service: Endoscopy;  Laterality: N/A;    FRACTURE SURGERY  1990    Ankle knee    HEMORRHOID SURGERY      INJECTION OF ANESTHETIC AGENT AROUND MEDIAL BRANCH NERVES INNERVATING LUMBAR FACET JOINT Right 09/25/2019    Procedure: Block-nerve-medial branch-lumbar;  Surgeon: Yonny Ferrer MD;  Location: [facility] OR;  Service: Pain Management;  Laterality: Right;  L2, 3, 4,5     INJECTION OF ANESTHETIC AGENT AROUND MEDIAL BRANCH NERVES INNERVATING LUMBAR FACET JOINT Right 10/16/2019    Procedure: Block-nerve-medial branch-lumbar;  Surgeon: Yonny Ferrer MD;  Location: [facility] OR;  Service: Pain Management;  Laterality: Right;  L2, 3, 4,5     JOINT REPLACEMENT      left    KIDNEY TRANSPLANT N/A 01/14/2019    Procedure: TRANSPLANT, KIDNEY;  Surgeon: Roland Salaazr MD;  Location: [address];  Service: Transplant;  Laterality: N/A;    KNEE SURGERY      RADIOFREQUENCY ABLATION OF LUMBAR MEDIAL BRANCH NERVE AT SINGLE LEVEL Right 10/29/2019    Procedure: Radiofrequency Ablation, Nerve, Spinal, Lumbar, Medial Branch, 1 Level;  Surgeon: Yonny Ferrer MD;  Location: [facility];  Service: Pain Management;  Laterality: Right;  L2, 3, 4, 5  burned at 80 degrees C X 60 seconds each site X 2    SHOULDER ARTHROSCOPY      SHOULDER SURGERY      SPINE SURGERY  2020    Pain pump in back    UPPER GASTROINTESTINAL ENDOSCOPY  ~2013     [facility]     Family History   Problem Relation Age of Onset    Diabetes Mother     Alzheimer's disease Mother     Thyroid disease Mother     Hyperlipidemia Mother     Arthritis Mother     Depression Mother     Heart disease Mother     Heart disease Father     Stroke Father     Diabetes Sister     Lupus Sister     Ovarian cancer Sister     Mental illness Sister     Heart disease Brother     No Known Problems Son     Diabetes Maternal Grandmother     Hypertension Maternal Grandmother     Kidney disease Maternal Grandmother     No Known Problems Paternal Grandmother     No Known  "Problems Paternal Grandfather     COPD Maternal Aunt     Diabetes Maternal Aunt     Heart disease Maternal Aunt     Hypertension Maternal Aunt     No Known Problems Maternal Uncle     No Known Problems Paternal Aunt     No Known Problems Paternal Uncle     Colon cancer Neg Hx     Colon polyps Neg Hx     Crohn's disease Neg Hx     Ulcerative colitis Neg Hx     Celiac disease Neg Hx      Social History     Tobacco Use    Smoking status: Never    Smokeless tobacco: Never   Substance Use Topics    Alcohol use: No     Alcohol/week: 0.0 standard drinks    Drug use: No       Review of patient's allergies indicates:   Allergen Reactions    Dilaudid [hydromorphone] Itching    Morphine Itching    Torsemide Diarrhea     Diarrhea stopped once discontinued med    Codeine Itching     Can take oxycodone and hydrocodone with Benadryl       Outpatient meds:  No current facility-administered medications on file prior to encounter.     Current Outpatient Medications on File Prior to Encounter   Medication Sig Dispense Refill    acetaminophen (TYLENOL) 325 MG tablet Take 2 tablets (650 mg total) by mouth every 8 (eight) hours as needed for Pain.  0    amLODIPine (NORVASC) 5 MG tablet Take 1 tablet by mouth once daily.      aspirin (ECOTRIN) 81 MG EC tablet Take 1 tablet (81 mg total) by mouth once daily. 30 tablet 0    BD LENIN 2ND GEN PEN NEEDLE 32 gauge x 5/32" Ndle Use up to four daily to inject novolog (Patient not taking: Reported on 6/26/2023) 400 each 3    blood sugar diagnostic Strp Check glucose 3 (three) times daily. (Patient not taking: Reported on 6/26/2023) 300 each PRN    blood-glucose sensor (DEXCOM G7 SENSOR) Amparo Change every 10 days (Patient not taking: Reported on 6/26/2023) 9 each 4    clopidogreL (PLAVIX) 75 mg tablet TAKE ONE TABLET BY MOUTH DAILY 90 tablet 3    colchicine (COLCRYS) 0.6 mg tablet Take 1 tablet (0.6 mg total) by mouth 2 (two) times daily. (Patient not taking: Reported on 6/26/2023) 180 tablet 3    " DEXCOM G7  Misc Dispense 1 (Patient not taking: Reported on 6/26/2023) 1 each 0    doxazosin (CARDURA) 2 MG tablet Take 2 mg by mouth once daily.      DULoxetine (CYMBALTA) 30 MG capsule Take 30 mg by mouth once daily.      empagliflozin (JARDIANCE) 10 mg tablet Take 1 tablet (10 mg total) by mouth once daily. 30 tablet 3    famotidine (PEPCID) 40 MG tablet Take 1 tablet (40 mg total) by mouth every evening. 30 tablet 6    flash glucose sensor (FREESTYLE DAKOTAH 14 DAY SENSOR) Kit Use 1 sensor every 14 days to check blood glucose (Patient not taking: Reported on 6/26/2023) 6 kit 3    insulin aspart U-100 (NOVOLOG FLEXPEN U-100 INSULIN) 100 unit/mL (3 mL) InPn pen 32 u breakfast and supper, 28 u lunch + correction. Max TDD 140u 45 mL 11    insulin degludec (TRESIBA FLEXTOUCH U-200) 200 unit/mL (3 mL) insulin pen Inject 76 Units into the skin once daily. 12 pen 3    irbesartan (AVAPRO) 150 MG tablet Take 1 tablet by mouth once daily.      isosorbide mononitrate (IMDUR) 30 MG 24 hr tablet TAKE ONE TABLET BY MOUTH DAILY 90 tablet 3    lancets (ONETOUCH ULTRASOFT LANCETS) Misc Use to test blood sugar 3 x's a day (Patient not taking: Reported on 6/26/2023) 300 each 3    lansoprazole (PREVACID) 30 MG capsule Take 1 capsule (30 mg total) by mouth 2 (two) times a day. 60 capsule 1    levothyroxine (SYNTHROID) 75 MCG tablet TAKE ONE TABLET BY MOUTH ONCE DAILY 90 tablet 3    LIDOcaine (LIDODERM) 5 % 1 patch.      LORazepam (ATIVAN) 1 MG tablet Take 1 mg by mouth every evening.      losartan (COZAAR) 50 MG tablet Take 1 tablet (50 mg total) by mouth once daily. 90 tablet 3    magnesium oxide 500 mg Tab Take 500 mg by mouth once daily.  0    metFORMIN (GLUCOPHAGE-XR) 500 MG ER 24hr tablet Take 1 tablet (500 mg total) by mouth 2 (two) times daily with meals. 180 tablet 1    metoprolol tartrate (LOPRESSOR) 25 MG tablet Take 0.5 tablets (12.5 mg total) by mouth 2 (two) times daily. 30 tablet 1    multivitamin (THERAGRAN)  tablet Take 1 tablet by mouth once daily.      nitroGLYCERIN (NITROSTAT) 0.4 MG SL tablet Place 1 tablet (0.4 mg total) under the tongue every 5 (five) minutes as needed for Chest pain. (Patient not taking: Reported on 6/26/2023) 20 tablet 1    ondansetron (ZOFRAN) 4 MG tablet Take 1 tablet (4 mg total) by mouth every 6 (six) hours. (Patient not taking: Reported on 6/26/2023) 12 tablet 0    sucralfate (CARAFATE) 1 gram tablet Take 1 tablet (1 g total) by mouth every 6 (six) hours as needed (GERD). 40 tablet 1    tacrolimus (PROGRAF) 1 MG Cap Take 1 capsule (1 mg total) by mouth every 12 (twelve) hours. 60 capsule 11    tiZANidine (ZANAFLEX) 4 MG tablet Take 1 tablet by mouth every evening.      vilazodone (VIIBRYD) 40 mg Tab tablet Take 1 tablet (40 mg total) by mouth once daily. 30 tablet 4    VRAYLAR 3 mg Cap Take 3 mg by mouth once daily.         Scheduled meds:   aspirin  81 mg Oral Daily    clopidogreL  75 mg Oral Daily    famotidine  40 mg Oral Daily    isosorbide mononitrate  30 mg Oral Daily    levothyroxine  75 mcg Oral Daily    losartan  50 mg Oral Daily    metoprolol tartrate  12.5 mg Oral BID    polyethylene glycol  17 g Oral BID    senna-docusate 8.6-50 mg  1 tablet Oral BID    tacrolimus  1 mg Oral BID    vilazodone  40 mg Oral Daily       Infusions:   sodium chloride 0.9%         PRN meds:  acetaminophen, aluminum-magnesium hydroxide-simethicone, bisacodyL, dextrose 10%, dextrose 10%, glucagon (human recombinant), glucose, glucose, insulin aspart U-100, LORazepam, melatonin, naloxone, nitroGLYCERIN, ondansetron, prochlorperazine, simethicone, sodium chloride 0.9%    Review of Systems:  Constitutional:  Negative for chills, fever, malaise/fatigue and weight loss.   HENT:  Negative for hearing loss and nosebleeds.    Eyes:  Negative for blurred vision, double vision and photophobia.   Respiratory:  Negative for cough, shortness of breath and wheezing.    Cardiovascular:  Negative for chest pain,  palpitations and leg swelling.   Gastrointestinal:  Negative for abdominal pain, constipation, diarrhea, heartburn, nausea and vomiting.   Genitourinary:  Negative for dysuria, frequency and urgency.   Musculoskeletal:  Negative for falls, joint pain and myalgias.   Skin:  Negative for itching and rash.   Neurological:  Negative for dizziness, speech change, focal weakness, loss of consciousness and headaches.   Endo/Heme/Allergies:  Does not bruise/bleed easily.   Psychiatric/Behavioral:  Negative for depression and substance abuse. The patient is not nervous/anxious.      OBJECTIVE:     Vital Signs and IO:  Temp:  [97.4 °F (36.3 °C)-98.5 °F (36.9 °C)]   Pulse:  [58-72]   Resp:  [16-18]   BP: (116-218)/(56-88)   SpO2:  [94 %-97 %]   I/O last 3 completed shifts:  In: 940 [P.O.:940]  Out: 500 [Urine:500]  Wt Readings from Last 5 Encounters:   07/09/23 90 kg (198 lb 6.6 oz)   06/21/23 92.6 kg (204 lb 2.3 oz)   05/31/23 93.9 kg (207 lb)   05/10/23 92.5 kg (203 lb 14.8 oz)   05/03/23 92.6 kg (204 lb 2.3 oz)     Body mass index is 31.08 kg/m².    Physical Exam  Constitutional:       General: Patient is not in acute distress.     Appearance: Patient is well-developed. She is not diaphoretic.   HENT:      Head: Normocephalic and atraumatic.      Mouth/Throat: Mucous membranes are moist.   Eyes:      General: No scleral icterus.     Pupils: Pupils are equal, round, and reactive to light.   Cardiovascular:      Rate and Rhythm: Normal rate and regular rhythm.   Pulmonary:      Effort: Pulmonary effort is normal. No respiratory distress.      Breath sounds: No stridor.   Abdominal:      General: There is no distension.      Palpations: Abdomen is soft.   Musculoskeletal:         General: No deformity. Normal range of motion.      Cervical back: Neck supple.   Skin:     General: Skin is warm and dry.      Findings: No rash present. No erythema.   Neurological:      Mental Status: Patient is alert and oriented to person, place,  and time.      Cranial Nerves: No cranial nerve deficit.   Psychiatric:         Behavior: Behavior normal.     Laboratory:  Recent Labs   Lab 07/07/23 2015 07/08/23  0502 07/09/23  0438    143 140   K 4.9 4.5 4.1    103 105   CO2 28 29 24   BUN 24* 23 20   CREATININE 1.4 1.3 1.1    108 140*       Recent Labs   Lab 07/07/23 2015 07/08/23  0502 07/09/23  0438   CALCIUM 10.2 9.4 9.3   ALBUMIN 4.3 3.9 3.9   PHOS  --  3.6 3.6   MG  --  1.7 1.8       Recent Labs   Lab 11/17/21  1110 05/19/22  0747   PTH, Intact 101.4 H 153.4 H       Recent Labs   Lab 07/08/23  0618 07/08/23  1130 07/08/23  1635 07/08/23  2108   POCTGLUCOSE 100 114* 208* 160*       Recent Labs   Lab 01/04/23  1023 04/26/23  1351 06/30/23  1117   Hemoglobin A1C 7.5 H 7.0 H 6.7 H       Recent Labs   Lab 07/07/23 2015 07/08/23  0502 07/09/23  0438   WBC 8.94 7.10 6.45   HGB 11.7* 10.8* 11.3*   HCT 38.1 34.6* 36.7*    147* 152   MCV 87 88 88   MCHC 30.7* 31.2* 30.8*   MONO 7.6  0.7 9.0  0.6 8.7  0.6       Recent Labs   Lab 07/07/23 2015 07/08/23  0502 07/09/23  0438   BILITOT 0.6 0.5 0.7   PROT 8.2 7.4 7.4   ALBUMIN 4.3 3.9 3.9   ALKPHOS 128 113 107   ALT 75* 64* 70*   AST 93* 70* 77*       Recent Labs   Lab 06/02/21  1000 02/07/22  0750 04/09/22  1604 08/30/22  2115 01/04/23  1009 04/26/23  1352 05/08/23  0953 05/19/23  1021 06/30/23  1056 06/30/23  1123   Color, UA Yellow Yellow Yellow Yellow   < > Yellow  --  Yellow  --  Yellow   Appearance, UA Clear Cloudy A Clear Cloudy A   < > Hazy A  --  Hazy A  --  Clear   pH, UA 6.0 5.0 5.0 5.5   < > 7.0  --  6.0  --  6.0   Specific Gravity, UA 1.020 1.025 1.010 1.025   < > 1.010  --  1.020  --  1.020   Protein, UA Negative Negative Negative Trace A   < > Negative  --  Trace A  --  Negative   Glucose, UA Negative Negative Negative Negative   < > Negative  --  Negative  --  4+ A   Ketones, UA Negative Negative Negative Trace A   < > Negative  --  Negative  --  Negative   Urobilinogen,  UA Negative  --  0.2 1.0  --   --   --   --   --   --    Bilirubin (UA) Negative Negative Negative Negative   < > Negative  --  Negative  --  Negative   Occult Blood UA Trace A Negative Negative Negative   < > Negative  --  Negative  --  Negative   Nitrite, UA Negative Negative Negative Negative   < > Negative  --  Positive A  --  Negative   RBC, UA 1 2 1  1 0  0  --  10 H   < > 2 2 1   WBC, UA 3 7 H 4  4 >100 H  >100 H  --  88 H   < > >100 H 12 H 12 H   Bacteria Rare Rare Negative  Negative Few A  Few A  --  Moderate A   < > Few A Rare Rare   Hyaline Casts, UA  --  1 1  1 3 A  3 A  --   --   --   --   --   --     < > = values in this interval not displayed.             Microbiology Results (last 7 days)       ** No results found for the last 168 hours. **            ASSESSMENT/PLAN:     s/p kidney transplant 2019  Chest pain with history of CAD with stents  DM  No NSAIDs, ACEI/ARB, IV contrast or other nephrotoxins.  Keep MAP > 60, SBP > 100.  Agree with IVF and plan for angiogram in AM by cards.  Hold Metformin and ARB before angiogram and for 48h after.  Control BGs with insulin.  Continue current immunosuppression.    Any contrast exposure will always be associated with elevated risk of SACHIN and/or dialysis - either short-term or long term. The potential risk can be diminished (but not entirely eliminated) by measures, incl avoidance of emergent procedures, limiting total single dose of contrast, spacing contrast exposures in time, pre-and post exposure hydration with NS or bicarb drip, holding diuretics, ACEi, NSAIDs and other nephrotoxins pre and post procedure.     Anemia of CKD  Hgb and HCT are acceptable. Monitor for now.  Will provide MATHEUS and/or IV iron PRN.    MBD / Secondary HPT  Ca, Phos, PTH and vitamin D levels are acceptable.   Phos binders, vitamin D and analogues, calcimimetics will be given as needed.    HTN  BP seem controlled.   Tolerate asymptomatic HTN up to -160.  Continue home  meds, hold ARB for angiogram.  Low sodium diet.    Thank you for allowing us to participate in the care of your patient!   We will follow the patient and provide recommendations as needed.    Patient care time was spent personally by me on the following activities:     Obtaining a history.  Examination of patient.  Providing medical care at the patients bedside.  Developing a treatment plan with patient or surrogate and bedside caregivers.  Ordering and reviewing laboratory studies, radiographic studies, pulse oximetry.  Ordering and performing treatments and interventions.  Evaluation of patient's response to treatment.  Discussions with consultants while on the unit and immediately available to the patient.  Re-evaluation of the patient's condition.  Documentation in the medical record.     Dalton Heaton MD    [facility]  [address] [place] [address], [place] [address]    [phone] - tel  [phone] - fax    7/9/2023

## 2023-07-09 NOTE — PROGRESS NOTES
[facility] Medicine  Progress Note    Patient Name: Andra Newell  MRN: [id number]  Patient Class: OP- Observation   Admission Date: 7/7/2023  Length of Stay: 0 days  Attending Physician: Komal Marte MD  Primary Care Provider: Fady Vaughn MD        Subjective:     Principal Problem:Chest pain        HPI:  Andra Newell is a 63-year-old female with a past medical history of anemia, arthritis, DM type 2, depression, CAD S/P stents (08/2022), and S/P renal transplant (2019) who presents with chest pain and associated nausea and vomiting the past week.  Patient was seen in previous ER on 06/19/2023 with similar symptoms and workup was negative.  She was followed up with her cardiologist, , on 6/21/23 and scheduled for outpatient cardiac PET. Patient's most recent stress and echo were performed 04/2023.  Patient states she has been experiencing intermittent chest pain for 1 week and describes it as a heaviness.  Patient states she takes nitroglycerin daily and it will provide relief for a few minutes.  EKG showed normal sinus rhythm with no ST elevation or depression, troponin 0.007, AST 93, ALT 75, eGFR 42 (38.5 last week) and hemoglobin 11.7.  Chest x-ray showed no acute abnormality.  Patient was given nitro paste in ED with some relief.  Patient referred to Hospital Medicine and seen in the ED.  Patient reports chest pain continues to occur intermittently with no precipitating factors.  Patient also reports chest tenderness and states this is not new.  Patient reports vomiting earlier today and denies any further episodes.  Patient denies shortness of breath, nausea, diarrhea, fever and chills.  Patient will be admitted to Hospital Medicine for further evaluation and management.          Overview/Hospital Course:  No notes on file    Interval History:  Patient seen and examined.  Significant other at bedside.  Patient reports continuing to of chest  pain.  Still with nausea.  Discussed results and plan of care.  They understand that transfer to [facility] for angio tomorrow may be recommended by Cardiology.  Review of Systems   Constitutional:  Negative for activity change, appetite change, chills and fever.   HENT:  Negative for congestion, sore throat and trouble swallowing.    Eyes:  Negative for photophobia and visual disturbance.   Respiratory:  Negative for cough, chest tightness and shortness of breath.    Cardiovascular:  Positive for chest pain. Negative for palpitations and leg swelling.   Gastrointestinal:  Positive for nausea. Negative for abdominal pain, diarrhea and vomiting.   Genitourinary:  Negative for dysuria, flank pain and hematuria.   Musculoskeletal:  Negative for back pain.   Neurological:  Negative for dizziness, weakness and headaches.   Psychiatric/Behavioral:  Negative for confusion.    Objective:     Vital Signs (Most Recent):  Temp: 97.4 °F (36.3 °C) (07/09/23 0725)  Pulse: 72 (07/09/23 0725)  Resp: 18 (07/09/23 0725)  BP: (!) 140/75 (07/09/23 0725)  SpO2: 95 % (07/09/23 0725) Vital Signs (24h Range):  Temp:  [97.4 °F (36.3 °C)-98.5 °F (36.9 °C)] 97.4 °F (36.3 °C)  Pulse:  [58-72] 72  Resp:  [16-18] 18  SpO2:  [94 %-97 %] 95 %  BP: (140-218)/(67-88) 140/75     Weight: 90 kg (198 lb 6.6 oz)  Body mass index is 31.08 kg/m².    Intake/Output Summary (Last 24 hours) at 7/9/2023 1058  Last data filed at 7/9/2023 0435  Gross per 24 hour   Intake 940 ml   Output 500 ml   Net 440 ml         Physical Exam  Vitals reviewed.   Constitutional:       Appearance: Normal appearance. She is obese.   HENT:      Head: Normocephalic.      Mouth/Throat:      Mouth: Mucous membranes are moist.      Pharynx: Oropharynx is clear.   Eyes:      Pupils: Pupils are equal, round, and reactive to light.   Cardiovascular:      Rate and Rhythm: Normal rate and regular rhythm.      Pulses: Normal pulses.      Heart sounds: Murmur heard.   Pulmonary:      Effort:  Pulmonary effort is normal.      Breath sounds: Normal breath sounds.   Abdominal:      General: Bowel sounds are normal.      Palpations: Abdomen is soft.   Musculoskeletal:         General: Normal range of motion.      Cervical back: Normal range of motion and neck supple.   Skin:     General: Skin is warm and dry.   Neurological:      Mental Status: She is alert and oriented to person, place, and time. Mental status is at baseline.   Psychiatric:         Mood and Affect: Mood normal.         Speech: Speech normal.         Behavior: Behavior normal.           Significant Labs: All pertinent labs within the past 24 hours have been reviewed.    Significant Imaging: I have reviewed all pertinent imaging results/findings within the past 24 hours.      Assessment/Plan:      * Chest pain  -Cardiology consulted, recommendations appreciated  -Trend troponin-stable  -Telemetry  -amylase within normal limits  -rule out pericarditis-ESR mildly elevated at 32, CRP normal.  Limited echo is ordered  -possible angiogram Monday per cards    Acquired hypothyroidism  Patient has chronic hypothyroidism. TFTs reviewed-   Lab Results   Component Value Date    TSH 0.859 07/08/2023   . Will continue chronic levothyroxine and adjust for and clinical changes.  TSH within normal limits    Hypertension associated with stage 3 chronic kidney disease due to type 2 diabetes mellitus  Chronic, controlled.  Latest blood pressure and vitals reviewed-     Temp:  [98.3 °F (36.8 °C)]   Pulse:  [69-86]   Resp:  [18-20]   BP: (146-234)/()   SpO2:  [94 %-97 %] .   Home meds for hypertension were reviewed.    While in the hospital, will manage blood pressure as follows; Continue home antihypertensive regimen    Will utilize p.r.n. blood pressure medication only if patient's blood pressure greater than 180/110 and she develops symptoms such as worsening chest pain or shortness of breath.        Kidney transplant recipient  History  noted    -Continue tacrolimus  -Monitor renal function      Gastroesophageal reflux disease without esophagitis  History noted    -Continue PPI      Mixed hyperlipidemia   Patient is not chronically on statin.will not continue for now. Monitor clinically. Last LDL was   Lab Results   Component Value Date    LDLCALC 27.6 (L) 04/26/2023            Anemia of chronic disease  Patient's anemia is currently controlled. Has not received any PRBCs to date.. Etiology likely d/t chronic disease  Current CBC reviewed-   Lab Results   Component Value Date    HGB 11.3 (L) 07/09/2023    HCT 36.7 (L) 07/09/2023     Monitor serial CBC and transfuse if patient becomes hemodynamically unstable, symptomatic or H/H drops below 7/21.         Bilateral low back pain without sciatica  Chronic    Patient has indwelling fentanyl pump      MDD (major depressive disorder), recurrent episode, mild  Patient has persistent depression which is moderate and is currently controlled. Will Continue anti-depressant medications. We will not consult psychiatry at this time. Patient does not display psychosis at this time. Continue to monitor closely and adjust plan of care as needed.        Controlled type 2 diabetes mellitus with complication, with long-term current use of insulin  Patient's FSGs are controlled on current medication regimen.  Last A1c reviewed-   Lab Results   Component Value Date    HGBA1C 6.7 (H) 06/30/2023     Most recent fingerstick glucose reviewed-   Recent Labs   Lab 07/08/23  1130 07/08/23  1635 07/08/23  2108   POCTGLUCOSE 114* 208* 160*     Current correctional scale  Low  Maintain anti-hyperglycemic dose as follows-   Antihyperglycemics (From admission, onward)    Start     Stop Route Frequency Ordered    07/08/23 0310  insulin aspart U-100 pen 1-10 Units         -- SubQ Before meals & nightly PRN 07/08/23 0213        Hold Oral hypoglycemics while patient is in the hospital.      VTE Risk Mitigation (From admission, onward)          Ordered     IP VTE HIGH RISK PATIENT  Once         07/08/23 0213     Place sequential compression device  Until discontinued         07/08/23 0213     Reason for No Pharmacological VTE Prophylaxis  Once        Question:  Reasons:  Answer:  Patient is Ambulatory    07/08/23 0213                Discharge Planning   DYANA:      Code Status: Full Code   Is the patient medically ready for discharge?:     Reason for patient still in hospital (select all that apply): Patient trending condition, Treatment and Consult recommendations  Discharge Plan A: Home with family                  Komal Marte MD  Department of Hospital Medicine   [facility]

## 2023-07-09 NOTE — ASSESSMENT & PLAN NOTE
-Cardiology consulted, recommendations appreciated  -Trend troponin-stable  -Telemetry  -amylase within normal limits  -rule out pericarditis-ESR mildly elevated at 32, CRP normal.  Limited echo is ordered  -possible angiogram Monday per cards

## 2023-07-09 NOTE — PLAN OF CARE
Problem: Adult Inpatient Plan of Care  Goal: Plan of Care Review  Outcome: Ongoing, Progressing  Goal: Patient-Specific Goal (Individualized)  Outcome: Ongoing, Progressing  Goal: Absence of Hospital-Acquired Illness or Injury  Outcome: Ongoing, Progressing  Goal: Optimal Comfort and Wellbeing  Outcome: Ongoing, Progressing  Goal: Readiness for Transition of Care  Outcome: Ongoing, Progressing     Problem: Diabetes Comorbidity  Goal: Blood Glucose Level Within Targeted Range  Outcome: Ongoing, Progressing   Plan of care reviewed with patient,verbalized understanding.   SB/SR on telemetry. HS . No complaints of CP during night. L arm fistula  + thrill, + bruit. Remains free from falls, injury. Instructed to call for assistance as needed ,verbalized understanding. Bed in low & locked position. Call light in reach, bed alarm on .

## 2023-07-10 ENCOUNTER — TELEPHONE (OUTPATIENT)
Dept: NEPHROLOGY | Facility: CLINIC | Age: 63
End: 2023-07-10

## 2023-07-10 ENCOUNTER — TELEPHONE (OUTPATIENT)
Dept: GASTROENTEROLOGY | Facility: CLINIC | Age: 63
End: 2023-07-10

## 2023-07-10 ENCOUNTER — CLINICAL SUPPORT (OUTPATIENT)
Dept: CARDIOLOGY | Facility: HOSPITAL | Age: 63
End: 2023-07-10
Attending: HOSPITALIST
Payer: MEDICARE

## 2023-07-10 ENCOUNTER — TELEPHONE (OUTPATIENT)
Dept: PRIMARY CARE CLINIC | Facility: CLINIC | Age: 63
End: 2023-07-10

## 2023-07-10 VITALS — WEIGHT: 200 LBS | HEIGHT: 67 IN | BODY MASS INDEX: 31.39 KG/M2

## 2023-07-10 PROBLEM — I25.10 CAD (CORONARY ARTERY DISEASE): Chronic | Status: ACTIVE | Noted: 2023-07-10

## 2023-07-10 PROBLEM — I35.1 AORTIC REGURGITATION: Chronic | Status: ACTIVE | Noted: 2023-07-10

## 2023-07-10 LAB
ALBUMIN SERPL BCP-MCNC: 3.9 G/DL (ref 3.5–5.2)
ALP SERPL-CCNC: 87 U/L (ref 55–135)
ALT SERPL W/O P-5'-P-CCNC: 68 U/L (ref 10–44)
ANION GAP SERPL CALC-SCNC: 7 MMOL/L (ref 8–16)
AST SERPL-CCNC: 75 U/L (ref 10–40)
BASOPHILS # BLD AUTO: 0.04 K/UL (ref 0–0.2)
BASOPHILS NFR BLD: 0.6 % (ref 0–1.9)
BILIRUB SERPL-MCNC: 0.8 MG/DL (ref 0.1–1)
BSA FOR ECHO PROCEDURE: 2.07 M2
BUN SERPL-MCNC: 21 MG/DL (ref 8–23)
CALCIUM SERPL-MCNC: 9 MG/DL (ref 8.7–10.5)
CHLORIDE SERPL-SCNC: 109 MMOL/L (ref 95–110)
CO2 SERPL-SCNC: 25 MMOL/L (ref 23–29)
CREAT SERPL-MCNC: 0.9 MG/DL (ref 0.5–1.4)
CV ECHO LV RWT: 0.56 CM
DIFFERENTIAL METHOD: ABNORMAL
ECHO LV POSTERIOR WALL: 1.24 CM (ref 0.6–1.1)
EJECTION FRACTION: 60 %
EOSINOPHIL # BLD AUTO: 0.7 K/UL (ref 0–0.5)
EOSINOPHIL NFR BLD: 10.1 % (ref 0–8)
ERYTHROCYTE [DISTWIDTH] IN BLOOD BY AUTOMATED COUNT: 15.9 % (ref 11.5–14.5)
EST. GFR  (NO RACE VARIABLE): >60 ML/MIN/1.73 M^2
FRACTIONAL SHORTENING: 32 % (ref 28–44)
GLUCOSE SERPL-MCNC: 124 MG/DL (ref 70–110)
GLUCOSE SERPL-MCNC: 193 MG/DL (ref 70–110)
HCT VFR BLD AUTO: 37.9 % (ref 37–48.5)
HGB BLD-MCNC: 11.7 G/DL (ref 12–16)
IMM GRANULOCYTES # BLD AUTO: 0.02 K/UL (ref 0–0.04)
IMM GRANULOCYTES NFR BLD AUTO: 0.3 % (ref 0–0.5)
INTERVENTRICULAR SEPTUM: 1.2 CM (ref 0.6–1.1)
IVC DIAMETER: 1.87 CM
LEFT INTERNAL DIMENSION IN SYSTOLE: 3 CM (ref 2.1–4)
LEFT VENTRICLE DIASTOLIC VOLUME INDEX: 35.94 ML/M2
LEFT VENTRICLE DIASTOLIC VOLUME: 72.6 ML
LEFT VENTRICLE MASS INDEX: 98 G/M2
LEFT VENTRICLE SYSTOLIC VOLUME INDEX: 11.7 ML/M2
LEFT VENTRICLE SYSTOLIC VOLUME: 23.7 ML
LEFT VENTRICULAR INTERNAL DIMENSION IN DIASTOLE: 4.43 CM (ref 3.5–6)
LEFT VENTRICULAR MASS: 198.04 G
LYMPHOCYTES # BLD AUTO: 1.8 K/UL (ref 1–4.8)
LYMPHOCYTES NFR BLD: 27.9 % (ref 18–48)
MAGNESIUM SERPL-MCNC: 1.7 MG/DL (ref 1.6–2.6)
MCH RBC QN AUTO: 27.8 PG (ref 27–31)
MCHC RBC AUTO-ENTMCNC: 30.9 G/DL (ref 32–36)
MCV RBC AUTO: 90 FL (ref 82–98)
MONOCYTES # BLD AUTO: 0.5 K/UL (ref 0.3–1)
MONOCYTES NFR BLD: 7.8 % (ref 4–15)
NEUTROPHILS # BLD AUTO: 3.5 K/UL (ref 1.8–7.7)
NEUTROPHILS NFR BLD: 53.3 % (ref 38–73)
NRBC BLD-RTO: 0 /100 WBC
OHS LV EJECTION FRACTION SIMPSONS BIPLANE MOD: 7 %
PHOSPHATE SERPL-MCNC: 3.4 MG/DL (ref 2.7–4.5)
PLATELET # BLD AUTO: 151 K/UL (ref 150–450)
PMV BLD AUTO: 10.4 FL (ref 9.2–12.9)
POTASSIUM SERPL-SCNC: 4.2 MMOL/L (ref 3.5–5.1)
PROT SERPL-MCNC: 7.2 G/DL (ref 6–8.4)
RBC # BLD AUTO: 4.21 M/UL (ref 4–5.4)
SODIUM SERPL-SCNC: 141 MMOL/L (ref 136–145)
TROPONIN I SERPL HS-MCNC: 9.3 PG/ML (ref 0–14.9)
WBC # BLD AUTO: 6.56 K/UL (ref 3.9–12.7)

## 2023-07-10 PROCEDURE — 85025 COMPLETE CBC W/AUTO DIFF WBC: CPT | Performed by: NURSE PRACTITIONER

## 2023-07-10 PROCEDURE — 82962 GLUCOSE BLOOD TEST: CPT

## 2023-07-10 PROCEDURE — 25000003 PHARM REV CODE 250: Performed by: GENERAL PRACTICE

## 2023-07-10 PROCEDURE — 25000003 PHARM REV CODE 250: Performed by: NURSE PRACTITIONER

## 2023-07-10 PROCEDURE — 96372 THER/PROPH/DIAG INJ SC/IM: CPT | Performed by: NURSE PRACTITIONER

## 2023-07-10 PROCEDURE — 63600175 PHARM REV CODE 636 W HCPCS: Performed by: NURSE PRACTITIONER

## 2023-07-10 PROCEDURE — 93308 TTE F-UP OR LMTD: CPT | Mod: 26,,, | Performed by: INTERNAL MEDICINE

## 2023-07-10 PROCEDURE — 84484 ASSAY OF TROPONIN QUANT: CPT | Performed by: HOSPITALIST

## 2023-07-10 PROCEDURE — 94761 N-INVAS EAR/PLS OXIMETRY MLT: CPT

## 2023-07-10 PROCEDURE — 63600175 PHARM REV CODE 636 W HCPCS: Performed by: GENERAL PRACTICE

## 2023-07-10 PROCEDURE — 36415 COLL VENOUS BLD VENIPUNCTURE: CPT | Performed by: HOSPITALIST

## 2023-07-10 PROCEDURE — 93308 TTE F-UP OR LMTD: CPT

## 2023-07-10 PROCEDURE — 84100 ASSAY OF PHOSPHORUS: CPT | Performed by: NURSE PRACTITIONER

## 2023-07-10 PROCEDURE — 80053 COMPREHEN METABOLIC PANEL: CPT | Performed by: NURSE PRACTITIONER

## 2023-07-10 PROCEDURE — 99499 UNLISTED E&M SERVICE: CPT | Mod: ,,, | Performed by: GENERAL PRACTICE

## 2023-07-10 PROCEDURE — 83735 ASSAY OF MAGNESIUM: CPT | Performed by: NURSE PRACTITIONER

## 2023-07-10 PROCEDURE — C1760 CLOSURE DEV, VASC: HCPCS | Performed by: GENERAL PRACTICE

## 2023-07-10 PROCEDURE — 25000003 PHARM REV CODE 250: Performed by: HOSPITALIST

## 2023-07-10 PROCEDURE — 99152 PR MOD CONSCIOUS SEDATION, SAME PHYS, 5+ YRS, FIRST 15 MIN: ICD-10-PCS | Mod: ,,, | Performed by: GENERAL PRACTICE

## 2023-07-10 PROCEDURE — C1894 INTRO/SHEATH, NON-LASER: HCPCS | Performed by: GENERAL PRACTICE

## 2023-07-10 PROCEDURE — G0378 HOSPITAL OBSERVATION PER HR: HCPCS

## 2023-07-10 PROCEDURE — 93308 ECHO (CUPID ONLY): ICD-10-PCS | Mod: 26,,, | Performed by: INTERNAL MEDICINE

## 2023-07-10 PROCEDURE — C1887 CATHETER, GUIDING: HCPCS | Performed by: GENERAL PRACTICE

## 2023-07-10 PROCEDURE — 99153 MOD SED SAME PHYS/QHP EA: CPT | Performed by: GENERAL PRACTICE

## 2023-07-10 PROCEDURE — C1769 GUIDE WIRE: HCPCS | Performed by: GENERAL PRACTICE

## 2023-07-10 PROCEDURE — 93458 L HRT ARTERY/VENTRICLE ANGIO: CPT | Performed by: GENERAL PRACTICE

## 2023-07-10 PROCEDURE — 93458 L HRT ARTERY/VENTRICLE ANGIO: CPT | Mod: 26,,, | Performed by: GENERAL PRACTICE

## 2023-07-10 PROCEDURE — 99499 NO LOS: ICD-10-PCS | Mod: ,,, | Performed by: GENERAL PRACTICE

## 2023-07-10 PROCEDURE — 94799 UNLISTED PULMONARY SVC/PX: CPT

## 2023-07-10 PROCEDURE — 93458 PR CATH PLACE/CORON ANGIO, IMG SUPER/INTERP,W LEFT HEART VENTRICULOGRAPHY: ICD-10-PCS | Mod: 26,,, | Performed by: GENERAL PRACTICE

## 2023-07-10 PROCEDURE — 25000003 PHARM REV CODE 250: Performed by: INTERNAL MEDICINE

## 2023-07-10 PROCEDURE — 99152 MOD SED SAME PHYS/QHP 5/>YRS: CPT | Mod: ,,, | Performed by: GENERAL PRACTICE

## 2023-07-10 PROCEDURE — 99152 MOD SED SAME PHYS/QHP 5/>YRS: CPT | Performed by: GENERAL PRACTICE

## 2023-07-10 PROCEDURE — 96372 THER/PROPH/DIAG INJ SC/IM: CPT | Performed by: INTERNAL MEDICINE

## 2023-07-10 PROCEDURE — 99900035 HC TECH TIME PER 15 MIN (STAT)

## 2023-07-10 PROCEDURE — 25500020 PHARM REV CODE 255: Performed by: GENERAL PRACTICE

## 2023-07-10 DEVICE — ANGIO-SEAL VIP VASCULAR CLOSURE DEVICE
Type: IMPLANTABLE DEVICE | Site: GROIN | Status: FUNCTIONAL
Brand: ANGIO-SEAL

## 2023-07-10 RX ORDER — MIDAZOLAM HYDROCHLORIDE 1 MG/ML
INJECTION INTRAMUSCULAR; INTRAVENOUS
Status: DISCONTINUED | OUTPATIENT
Start: 2023-07-10 | End: 2023-07-10 | Stop reason: HOSPADM

## 2023-07-10 RX ORDER — DOXAZOSIN 1 MG/1
2 TABLET ORAL DAILY
Status: DISCONTINUED | OUTPATIENT
Start: 2023-07-10 | End: 2023-07-11

## 2023-07-10 RX ORDER — ONDANSETRON 4 MG/1
8 TABLET, ORALLY DISINTEGRATING ORAL EVERY 8 HOURS PRN
Status: DISCONTINUED | OUTPATIENT
Start: 2023-07-10 | End: 2023-07-10

## 2023-07-10 RX ORDER — AMLODIPINE BESYLATE 5 MG/1
5 TABLET ORAL DAILY
Status: DISCONTINUED | OUTPATIENT
Start: 2023-07-10 | End: 2023-07-11

## 2023-07-10 RX ORDER — IODIXANOL 320 MG/ML
INJECTION, SOLUTION INTRAVASCULAR
Status: DISCONTINUED | OUTPATIENT
Start: 2023-07-10 | End: 2023-07-10 | Stop reason: HOSPADM

## 2023-07-10 RX ORDER — SODIUM CHLORIDE 9 MG/ML
INJECTION, SOLUTION INTRAVENOUS CONTINUOUS
Status: DISCONTINUED | OUTPATIENT
Start: 2023-07-10 | End: 2023-07-10

## 2023-07-10 RX ORDER — LIDOCAINE HYDROCHLORIDE 10 MG/ML
INJECTION INFILTRATION; PERINEURAL
Status: DISCONTINUED | OUTPATIENT
Start: 2023-07-10 | End: 2023-07-10 | Stop reason: HOSPADM

## 2023-07-10 RX ORDER — HYDRALAZINE HYDROCHLORIDE 25 MG/1
25 TABLET, FILM COATED ORAL EVERY 8 HOURS PRN
Status: DISCONTINUED | OUTPATIENT
Start: 2023-07-10 | End: 2023-07-11 | Stop reason: HOSPADM

## 2023-07-10 RX ORDER — FENTANYL CITRATE 50 UG/ML
INJECTION, SOLUTION INTRAMUSCULAR; INTRAVENOUS
Status: DISCONTINUED | OUTPATIENT
Start: 2023-07-10 | End: 2023-07-10 | Stop reason: HOSPADM

## 2023-07-10 RX ORDER — LOSARTAN POTASSIUM 50 MG/1
50 TABLET ORAL DAILY
Status: DISCONTINUED | OUTPATIENT
Start: 2023-07-10 | End: 2023-07-11

## 2023-07-10 RX ORDER — ACETAMINOPHEN 325 MG/1
650 TABLET ORAL EVERY 4 HOURS PRN
Status: DISCONTINUED | OUTPATIENT
Start: 2023-07-10 | End: 2023-07-11 | Stop reason: HOSPADM

## 2023-07-10 RX ORDER — ACETAMINOPHEN 325 MG/1
650 TABLET ORAL EVERY 8 HOURS PRN
Status: DISCONTINUED | OUTPATIENT
Start: 2023-07-10 | End: 2023-07-11 | Stop reason: HOSPADM

## 2023-07-10 RX ADMIN — METOPROLOL TARTRATE 12.5 MG: 25 TABLET, FILM COATED ORAL at 08:07

## 2023-07-10 RX ADMIN — INSULIN DETEMIR 20 UNITS: 100 INJECTION, SOLUTION SUBCUTANEOUS at 08:07

## 2023-07-10 RX ADMIN — LEVOTHYROXINE SODIUM 75 MCG: 0.03 TABLET ORAL at 05:07

## 2023-07-10 RX ADMIN — HYDRALAZINE HYDROCHLORIDE 25 MG: 25 TABLET ORAL at 11:07

## 2023-07-10 RX ADMIN — DOXAZOSIN 2 MG: 1 TABLET ORAL at 04:07

## 2023-07-10 RX ADMIN — INSULIN ASPART 1 UNITS: 100 INJECTION, SOLUTION INTRAVENOUS; SUBCUTANEOUS at 09:07

## 2023-07-10 RX ADMIN — AMLODIPINE BESYLATE 5 MG: 5 TABLET ORAL at 04:07

## 2023-07-10 RX ADMIN — ISOSORBIDE MONONITRATE 30 MG: 30 TABLET, EXTENDED RELEASE ORAL at 08:07

## 2023-07-10 RX ADMIN — ASPIRIN 81 MG: 81 TABLET, COATED ORAL at 08:07

## 2023-07-10 RX ADMIN — VILAZODONE HYDROCHLORIDE 40 MG: 40 TABLET ORAL at 10:07

## 2023-07-10 RX ADMIN — TACROLIMUS 1 MG: 1 CAPSULE ORAL at 05:07

## 2023-07-10 RX ADMIN — CLOPIDOGREL BISULFATE 75 MG: 75 TABLET, FILM COATED ORAL at 08:07

## 2023-07-10 RX ADMIN — HYDRALAZINE HYDROCHLORIDE 25 MG: 25 TABLET ORAL at 01:07

## 2023-07-10 RX ADMIN — TACROLIMUS 1 MG: 1 CAPSULE ORAL at 08:07

## 2023-07-10 RX ADMIN — HYDRALAZINE HYDROCHLORIDE 25 MG: 25 TABLET ORAL at 02:07

## 2023-07-10 RX ADMIN — FAMOTIDINE 40 MG: 20 TABLET ORAL at 08:07

## 2023-07-10 RX ADMIN — POLYETHYLENE GLYCOL 3350 17 G: 17 POWDER, FOR SOLUTION ORAL at 10:07

## 2023-07-10 RX ADMIN — LOSARTAN POTASSIUM 50 MG: 50 TABLET, FILM COATED ORAL at 04:07

## 2023-07-10 RX ADMIN — SENNOSIDES AND DOCUSATE SODIUM 1 TABLET: 50; 8.6 TABLET ORAL at 08:07

## 2023-07-10 RX ADMIN — SODIUM CHLORIDE: 0.9 INJECTION, SOLUTION INTRAVENOUS at 11:07

## 2023-07-10 NOTE — TELEPHONE ENCOUNTER
----- Message from Kassy Roberts RN sent at 7/10/2023 11:23 AM CDT -----  Good morning, this pt will be discharging from the hospital today and will need a hospital follow up appt, thanks!

## 2023-07-10 NOTE — TELEPHONE ENCOUNTER
----- Message from Lillian Hickman sent at 7/10/2023 11:25 AM CDT -----  Type:  Sooner Appointment Request    Caller is requesting a sooner appointment.  Caller declined first available appointment listed below.  Caller will not accept being placed on the waitlist and is requesting a message be sent to doctor.    Name of Caller:  Pt    When is the first available appointment?  9/27/23    Symptoms:  possible high anul hernia    Best Call Back Number:  [phone]    Additional Information:  Pt is trying to get seen soon as possible following her hospital stay. Please call back to advise. Thanks!

## 2023-07-10 NOTE — H&P
Patient transferred from [facility] in stable condition.    HPI by Dr. Marte:  Andra Newell is a 63-year-old female with a past medical history of anemia, arthritis, DM type 2, depression, CAD S/P stents (08/2022), and S/P renal transplant (2019) who presents with chest pain and associated nausea and vomiting the past week.  Patient was seen in previous ER on 06/19/2023 with similar symptoms and workup was negative.  She was followed up with her cardiologist, , on 6/21/23 and scheduled for outpatient cardiac PET. Patient's most recent stress and echo were performed 04/2023.  Patient states she has been experiencing intermittent chest pain for 1 week and describes it as a heaviness.  Patient states she takes nitroglycerin daily and it will provide relief for a few minutes.  EKG showed normal sinus rhythm with no ST elevation or depression, troponin 0.007, AST 93, ALT 75, eGFR 42 (38.5 last week) and hemoglobin 11.7.  Chest x-ray showed no acute abnormality.  Patient was given nitro paste in ED with some relief.  Patient referred to Hospital Medicine and seen in the ED.  Patient reports chest pain continues to occur intermittently with no precipitating factors.  Patient also reports chest tenderness and states this is not new.  Patient reports vomiting earlier today and denies any further episodes.  Patient denies shortness of breath, nausea, diarrhea, fever and chills.  Patient will be admitted to Hospital Medicine for further evaluation and management.    Plan for limited echo to evaluate for pericarditis, Transferred for angiogram by Dr. Hunter Monday morning    Continue current care.

## 2023-07-10 NOTE — SUBJECTIVE & OBJECTIVE
Interval History:  See hospital course.  Still reporting left-sided chest pressure, constant, mild tenderness to palpation.  No further nausea or emesis but appetite remains low, no bowel movements in 5 days.  Reviewed home antihypertensives and will resume and monitor blood pressure.  Telemetry with sinus rhythm.  Blood pressure not well controlled.  Labs with hemoglobin 11.7, BUN/creatinine 21/0.9.  Echo with EF of 60 percent with mild to moderate aortic regurgitation.  Previous EGD report reviewed.  Discussed with patient.  Discussed with nursing.    Review of Systems   Constitutional:  Negative for fever.   Cardiovascular:  Positive for chest pain. Negative for leg swelling.   Gastrointestinal:  Positive for constipation.   Psychiatric/Behavioral:  Negative for confusion.    Objective:     Vital Signs (Most Recent):  Temp: 97 °F (36.1 °C) (07/10/23 1101)  Pulse: (!) 58 (07/10/23 1400)  Resp: (!) 21 (07/10/23 1400)  BP: (!) 187/91 (07/10/23 1400)  SpO2: 98 % (07/10/23 1400) Vital Signs (24h Range):  Temp:  [97 °F (36.1 °C)-98.5 °F (36.9 °C)] 97 °F (36.1 °C)  Pulse:  [50-73] 58  Resp:  [10-24] 21  SpO2:  [95 %-100 %] 98 %  BP: (132-197)/(62-91) 187/91     Weight: 91 kg (200 lb 9.9 oz)  Body mass index is 31.42 kg/m².    Intake/Output Summary (Last 24 hours) at 7/10/2023 1559  Last data filed at 7/10/2023 1200  Gross per 24 hour   Intake 2100.3 ml   Output --   Net 2100.3 ml         Physical Exam  Vitals and nursing note reviewed.   Constitutional:       General: She is not in acute distress.     Appearance: She is not ill-appearing.   HENT:      Head: Normocephalic and atraumatic.      Mouth/Throat:      Mouth: Mucous membranes are moist.   Cardiovascular:      Rate and Rhythm: Normal rate and regular rhythm.      Heart sounds: Murmur heard.   Pulmonary:      Effort: No respiratory distress.      Breath sounds: No stridor. No wheezing.      Comments: On RA  Abdominal:      Palpations: Abdomen is soft.       Tenderness: There is no abdominal tenderness.   Skin:     Comments: R groin angio access site cdi dressing   Neurological:      Mental Status: She is alert and oriented to person, place, and time.   Psychiatric:         Mood and Affect: Mood normal.           Significant Labs: BMP:   Recent Labs   Lab 07/10/23  0454   *      K 4.2      CO2 25   BUN 21   CREATININE 0.9   CALCIUM 9.0   MG 1.7     CBC:   Recent Labs   Lab 07/09/23 0438 07/10/23  0454   WBC 6.45 6.56   HGB 11.3* 11.7*   HCT 36.7* 37.9    151     CMP:   Recent Labs   Lab 07/09/23 0438 07/10/23  0454    141   K 4.1 4.2    109   CO2 24 25   * 124*   BUN 20 21   CREATININE 1.1 0.9   CALCIUM 9.3 9.0   PROT 7.4 7.2   ALBUMIN 3.9 3.9   BILITOT 0.7 0.8   ALKPHOS 107 87   AST 77* 75*   ALT 70* 68*   ANIONGAP 11 7*     Cardiac Markers: No results for input(s): CKMB, MYOGLOBIN, BNP, TROPISTAT in the last 48 hours.  Magnesium:   Recent Labs   Lab 07/09/23  0438 07/10/23  0454   MG 1.8 1.7     POCT Glucose:   Recent Labs   Lab 07/08/23  1635 07/08/23  2108   POCTGLUCOSE 208* 160*       Significant Imaging: I have reviewed all pertinent imaging results/findings within the past 24 hours.

## 2023-07-10 NOTE — HOSPITAL COURSE
Patient with a history of CAD status post PCI, history of renal transplant, insulin-dependent diabetes mellitus, hypertension, depression, GERD, presented as transfer from [facility] for [facility].  Patient presented with left-sided chest pain/pressure associated with nausea and emesis, latter symptoms have resolved but chest pressure persisted.  EKG no ST elevation, troponin flat, followed by outpatient cardiologist , and was planned for outpatient cardiac PET.  She also had recent EGD May 2023, widely patent Schatzki ring lower esophagus status post Savary dilatation, biopsies negative for eosinophilic esophagitis, mild gastritis, negative H pylori, recommends b.i.d. PPI.  Previous echo with EF of 60 % with mild to moderate aortic regurgitation.  On 07/10 underwent left heart catheterization by Dr. Hunter, [facility] report as outlined below, alternate etiology for chest pain suspected.  Blood pressure still not controlled up to 180, home antihypertensives resumed and monitoring overnight.  On 07/11 no new complaints, with resumption of home antihypertensives blood pressure better controlled in afternoon.  Feels ready for discharge, cleared by consultant for discharge.  Discharge plan including medication, follow-up as well as return precautions were reviewed, she expressed understanding, no questions or concerns.  Discussed with nursing.      Discharge examination  Lying in bed, alert and oriented, on RA, right groin cdi dressing    [facility]  Summary         The Dist RCA lesion was 50% stenosed.    The Dist LAD lesion was 65% stenosed.    The pre-procedure left ventricular end diastolic pressure was 10.    The estimated blood loss was none.    There was non-obstructive coronary artery disease..PATENT STENT IN DIAGONAL

## 2023-07-10 NOTE — CONSULTS
Nephrology Consult Note        Patient Name: Andra Newell  MRN: [id number]    Patient Class: OP- Observation   Admission Date: 7/7/2023  Length of Stay: 0 days  Date of Service: 7/10/2023    Attending Physician: Sabrina Momin MD  Primary Care Provider: Fady Vaughn MD    Reason for Consult: kidney transplant patient    SUBJECTIVE:     HPI: 63F with a past medical history of anemia, arthritis, DM type 2, depression, CAD with stents (08/2022), and history renal transplant (2019) and previously followed by Dr. Heaton for years, now seen recently as new patient by Dr. Adolfo Steel after patient moved to [place] who presents with chest pain and associated nausea and vomiting in the past week.  Patient was seen in previous ER on 06/19/2023 with similar symptoms and workup was negative. She followed up with her cardiologist, Dr. Werner and was scheduled for outpatient cardiac PET. Patient's most recent stress and echo were performed 04/2023.  Patient states she has been experiencing intermittent chest pain for 1 week and describes it as a heaviness. Patient states she takes nitroglycerin daily and it will provide relief for a few minutes. EKG showed normal sinus rhythm with no ST elevation or depression, troponin 0.007, AST 93, ALT 75, eGFR 42 (38.5 last week) and hemoglobin 11.7. Chest x-ray showed no acute abnormality. Patient was given nitro paste in ED with some relief. Patient reports chest pain continues to occur intermittently with no precipitating factors. She is receiving IVF in preparation for cath tomorrow AM and will be moved to [facility] today for close observation.    7/10  Transferred to [facility] for angiogram.  No nausea, chest pain, sob, fever, urinary or bowel complaint, new neurologic symptoms, new joint pain      Past Medical History:   Diagnosis Date    Anemia     Anemia in chronic kidney disease, on chronic dialysis 7/12/2017    Anxiety and depression     Aplastic anemia with  "parvovirus B19 infection 5/27/2019    Aplastic anemia with parvovirus B19 infection 4/2019 5/27/2019    Arthritis     Asthma     allergic airway    CKD stage III (moderate) 2/18/2019    Colon polyp     Depression     Diabetes mellitus     Diverticulosis     Encounter for blood transfusion     Eosinophilia     ESRD (end stage renal disease)     stage V, due for living donor 9/2018    ESRD on dialysis     GERD (gastroesophageal reflux disease)     Gout     Gout     Hyperlipidemia     Hypertension     Hypertriglyceridemia without hypercholesterolemia 6/9/2019    Low back pain     Low HDL (under 40) 6/9/2019    MRSA carrier     Neutropenia, unspecified 5/8/2019    Obesity     Renal disorder     Rotator cuff syndrome--left     Thyroid disease     Ulnar neuropathy of right upper extremity     Uncontrolled type 2 diabetes mellitus with hyperglycemia      Past Surgical History:   Procedure Laterality Date    ACHILLES TENDON SURGERY Left 05/2015    BACK SURGERY      CERVICAL SPINE SURGERY  2021    cervical gate placed by Dr. Vera- screws,plate, and "gate"    CHOLECYSTECTOMY      COLONOSCOPY N/A 09/06/2017    Procedure: COLONOSCOPY;  Surgeon: Marisol Bryson MD;  Location: [facility];  Service: Endoscopy;  Laterality: N/A; REPEAT IN 3 YEARS FOR SURVEILLANCE    COLONOSCOPY N/A 05/09/2019    Procedure: COLONOSCOPY;  Surgeon: Fady Ewing MD;  Location: [facility] ([address] [place] [address]);  Service: Endoscopy;  Laterality: N/A;    CORONARY ANGIOGRAPHY  08/31/2022    Procedure: ANGIOGRAM, CORONARY ARTERY;  Surgeon: Christoph Dang MD;  Location: [facility] CATH;  Service: Cardiology;;    DIALYSIS FISTULA CREATION  12/2017    ESOPHAGOGASTRODUODENOSCOPY N/A 05/09/2019    Procedure: EGD (ESOPHAGOGASTRODUODENOSCOPY);  Surgeon: Fady Ewing MD;  [facility]  Service: Endoscopy;  Laterality: N/A;    ESOPHAGOGASTRODUODENOSCOPY N/A 06/10/2021    Procedure: EGD (ESOPHAGOGASTRODUODENOSCOPY);  Surgeon: Marisol Bryson MD;  " Location: [facility];  Service: Endoscopy;  Laterality: N/A;    ESOPHAGOGASTRODUODENOSCOPY N/A 5/31/2023    Procedure: EGD (ESOPHAGOGASTRODUODENOSCOPY);  Surgeon: Marisol Bryson MD;  Location: [facility];  Service: Endoscopy;  Laterality: N/A;    FRACTURE SURGERY  1990    Ankle knee    HEMORRHOID SURGERY      INJECTION OF ANESTHETIC AGENT AROUND MEDIAL BRANCH NERVES INNERVATING LUMBAR FACET JOINT Right 09/25/2019    Procedure: Block-nerve-medial branch-lumbar;  Surgeon: Yonny Ferrer MD;  Location: [facility];  Service: Pain Management;  Laterality: Right;  L2, 3, 4,5     INJECTION OF ANESTHETIC AGENT AROUND MEDIAL BRANCH NERVES INNERVATING LUMBAR FACET JOINT Right 10/16/2019    Procedure: Block-nerve-medial branch-lumbar;  Surgeon: Yonny Ferrer MD;  Location: [facility];  Service: Pain Management;  Laterality: Right;  L2, 3, 4,5     JOINT REPLACEMENT      left    KIDNEY TRANSPLANT N/A 01/14/2019    Procedure: TRANSPLANT, KIDNEY;  Surgeon: Roland Salazar MD;  Location: [address];  Service: Transplant;  Laterality: N/A;    KNEE SURGERY      RADIOFREQUENCY ABLATION OF LUMBAR MEDIAL BRANCH NERVE AT SINGLE LEVEL Right 10/29/2019    Procedure: Radiofrequency Ablation, Nerve, Spinal, Lumbar, Medial Branch, 1 Level;  Surgeon: Yonny Ferrer MD;  Location: [facility];  Service: Pain Management;  Laterality: Right;  L2, 3, 4, 5  burned at 80 degrees C X 60 seconds each site X 2    SHOULDER ARTHROSCOPY      SHOULDER SURGERY      SPINE SURGERY  2020    Pain pump in back    UPPER GASTROINTESTINAL ENDOSCOPY  ~2013     [facility]     Family History   Problem Relation Age of Onset    Diabetes Mother     Alzheimer's disease Mother     Thyroid disease Mother     Hyperlipidemia Mother     Arthritis Mother     Depression Mother     Heart disease Mother     Heart disease Father     Stroke Father     Diabetes Sister     Lupus Sister     Ovarian cancer Sister     Mental illness Sister     Heart disease Brother     No Known Problems Son      "Diabetes Maternal Grandmother     Hypertension Maternal Grandmother     Kidney disease Maternal Grandmother     No Known Problems Paternal Grandmother     No Known Problems Paternal Grandfather     COPD Maternal Aunt     Diabetes Maternal Aunt     Heart disease Maternal Aunt     Hypertension Maternal Aunt     No Known Problems Maternal Uncle     No Known Problems Paternal Aunt     No Known Problems Paternal Uncle     Colon cancer Neg Hx     Colon polyps Neg Hx     Crohn's disease Neg Hx     Ulcerative colitis Neg Hx     Celiac disease Neg Hx      Social History     Tobacco Use    Smoking status: Never    Smokeless tobacco: Never   Substance Use Topics    Alcohol use: No     Alcohol/week: 0.0 standard drinks    Drug use: No       Review of patient's allergies indicates:   Allergen Reactions    Dilaudid [hydromorphone] Itching    Morphine Itching    Torsemide Diarrhea     Diarrhea stopped once discontinued med    Codeine Itching     Can take oxycodone and hydrocodone with Benadryl       Outpatient meds:  No current facility-administered medications on file prior to encounter.     Current Outpatient Medications on File Prior to Encounter   Medication Sig Dispense Refill    levothyroxine (SYNTHROID) 75 MCG tablet TAKE ONE TABLET BY MOUTH ONCE DAILY 90 tablet 3    magnesium oxide 500 mg Tab Take 500 mg by mouth once daily.  0    metFORMIN (GLUCOPHAGE-XR) 500 MG ER 24hr tablet Take 1 tablet (500 mg total) by mouth 2 (two) times daily with meals. 180 tablet 1    metoprolol tartrate (LOPRESSOR) 25 MG tablet Take 0.5 tablets (12.5 mg total) by mouth 2 (two) times daily. 30 tablet 1    acetaminophen (TYLENOL) 325 MG tablet Take 2 tablets (650 mg total) by mouth every 8 (eight) hours as needed for Pain.  0    amLODIPine (NORVASC) 5 MG tablet Take 1 tablet by mouth once daily.      aspirin (ECOTRIN) 81 MG EC tablet Take 1 tablet (81 mg total) by mouth once daily. 30 tablet 0    BD LENIN 2ND GEN PEN NEEDLE 32 gauge x 5/32" Ndle " Use up to four daily to inject novolog (Patient not taking: Reported on 6/26/2023) 400 each 3    blood sugar diagnostic Strp Check glucose 3 (three) times daily. (Patient not taking: Reported on 6/26/2023) 300 each PRN    blood-glucose sensor (DEXCOM G7 SENSOR) Amparo Change every 10 days (Patient not taking: Reported on 6/26/2023) 9 each 4    clopidogreL (PLAVIX) 75 mg tablet TAKE ONE TABLET BY MOUTH DAILY 90 tablet 3    colchicine (COLCRYS) 0.6 mg tablet Take 1 tablet (0.6 mg total) by mouth 2 (two) times daily. (Patient not taking: Reported on 6/26/2023) 180 tablet 3    DEXCOM G7  Misc Dispense 1 (Patient not taking: Reported on 6/26/2023) 1 each 0    doxazosin (CARDURA) 2 MG tablet Take 2 mg by mouth once daily.      DULoxetine (CYMBALTA) 30 MG capsule Take 30 mg by mouth once daily.      empagliflozin (JARDIANCE) 10 mg tablet Take 1 tablet (10 mg total) by mouth once daily. 30 tablet 3    famotidine (PEPCID) 40 MG tablet Take 1 tablet (40 mg total) by mouth every evening. 30 tablet 6    flash glucose sensor (FREESTYLE DAKOTAH 14 DAY SENSOR) Kit Use 1 sensor every 14 days to check blood glucose (Patient not taking: Reported on 6/26/2023) 6 kit 3    insulin aspart U-100 (NOVOLOG FLEXPEN U-100 INSULIN) 100 unit/mL (3 mL) InPn pen 32 u breakfast and supper, 28 u lunch + correction. Max TDD 140u 45 mL 11    insulin degludec (TRESIBA FLEXTOUCH U-200) 200 unit/mL (3 mL) insulin pen Inject 76 Units into the skin once daily. 12 pen 3    irbesartan (AVAPRO) 150 MG tablet Take 1 tablet by mouth once daily.      isosorbide mononitrate (IMDUR) 30 MG 24 hr tablet TAKE ONE TABLET BY MOUTH DAILY 90 tablet 3    lancets (ONETOUCH ULTRASOFT LANCETS) Misc Use to test blood sugar 3 x's a day (Patient not taking: Reported on 6/26/2023) 300 each 3    lansoprazole (PREVACID) 30 MG capsule Take 1 capsule (30 mg total) by mouth 2 (two) times a day. 60 capsule 1    LIDOcaine (LIDODERM) 5 % 1 patch.      LORazepam (ATIVAN) 1 MG tablet  Take 1 mg by mouth every evening.      losartan (COZAAR) 50 MG tablet Take 1 tablet (50 mg total) by mouth once daily. 90 tablet 3    multivitamin (THERAGRAN) tablet Take 1 tablet by mouth once daily.      nitroGLYCERIN (NITROSTAT) 0.4 MG SL tablet Place 1 tablet (0.4 mg total) under the tongue every 5 (five) minutes as needed for Chest pain. (Patient not taking: Reported on 6/26/2023) 20 tablet 1    ondansetron (ZOFRAN) 4 MG tablet Take 1 tablet (4 mg total) by mouth every 6 (six) hours. (Patient not taking: Reported on 6/26/2023) 12 tablet 0    sucralfate (CARAFATE) 1 gram tablet Take 1 tablet (1 g total) by mouth every 6 (six) hours as needed (GERD). 40 tablet 1    tacrolimus (PROGRAF) 1 MG Cap Take 1 capsule (1 mg total) by mouth every 12 (twelve) hours. 60 capsule 11    tiZANidine (ZANAFLEX) 4 MG tablet Take 1 tablet by mouth every evening.      vilazodone (VIIBRYD) 40 mg Tab tablet Take 1 tablet (40 mg total) by mouth once daily. 30 tablet 4    VRAYLAR 3 mg Cap Take 3 mg by mouth once daily.         Scheduled meds:   aspirin  81 mg Oral Daily    clopidogreL  75 mg Oral Daily    famotidine  40 mg Oral Daily    isosorbide mononitrate  30 mg Oral Daily    levothyroxine  75 mcg Oral Daily    metoprolol tartrate  12.5 mg Oral BID    polyethylene glycol  17 g Oral BID    senna-docusate 8.6-50 mg  1 tablet Oral BID    tacrolimus  1 mg Oral BID    vilazodone  40 mg Oral Daily       Infusions:   sodium chloride 0.9%         PRN meds:  acetaminophen, acetaminophen, aluminum-magnesium hydroxide-simethicone, bisacodyL, dextrose 50%, dextrose 50%, glucagon (human recombinant), glucose, glucose, hydrALAZINE, insulin aspart U-100, LORazepam, melatonin, naloxone, nitroGLYCERIN, ondansetron, ondansetron, prochlorperazine, simethicone, sodium chloride 0.9%, sodium chloride 0.9%    Review of Systems:  Constitutional:  Negative for chills, fever, malaise/fatigue and weight loss.   HENT:  Negative for hearing loss and nosebleeds.     Eyes:  Negative for blurred vision, double vision and photophobia.   Respiratory:  Negative for cough, shortness of breath and wheezing.    Cardiovascular:  Negative for chest pain, palpitations and leg swelling.   Gastrointestinal:  Negative for abdominal pain, constipation, diarrhea, heartburn, nausea and vomiting.   Genitourinary:  Negative for dysuria, frequency and urgency.   Musculoskeletal:  Negative for falls, joint pain and myalgias.   Skin:  Negative for itching and rash.   Neurological:  Negative for dizziness, speech change, focal weakness, loss of consciousness and headaches.   Endo/Heme/Allergies:  Does not bruise/bleed easily.   Psychiatric/Behavioral:  Negative for depression and substance abuse. The patient is not nervous/anxious.      OBJECTIVE:     Vital Signs and IO:  Temp:  [97.7 °F (36.5 °C)-98.5 °F (36.9 °C)]   Pulse:  [50-73]   Resp:  [10-24]   BP: (116-197)/(56-82)   SpO2:  [95 %-100 %]   I/O last 3 completed shifts:  In: 240 [P.O.:240]  Out: 500 [Urine:500]  Wt Readings from Last 5 Encounters:   07/09/23 91 kg (200 lb 9.9 oz)   07/10/23 90.7 kg (200 lb)   06/21/23 92.6 kg (204 lb 2.3 oz)   05/31/23 93.9 kg (207 lb)   05/10/23 92.5 kg (203 lb 14.8 oz)     Body mass index is 31.42 kg/m².    Physical Exam  Constitutional:       General: Patient is not in acute distress.     Appearance: Patient is well-developed. She is not diaphoretic.   HENT:      Head: Normocephalic and atraumatic.      Mouth/Throat: Mucous membranes are moist.   Eyes:      General: No scleral icterus.     Pupils: Pupils are equal, round, and reactive to light.   Cardiovascular:      Rate and Rhythm: Normal rate and regular rhythm.   Pulmonary:      Effort: Pulmonary effort is normal. No respiratory distress.      Breath sounds: No stridor.   Abdominal:      General: There is no distension.      Palpations: Abdomen is soft.   Musculoskeletal:         General: No deformity. Normal range of motion.      Cervical back: Neck  supple.   Skin:     General: Skin is warm and dry.      Findings: No rash present. No erythema.   Neurological:      Mental Status: Patient is alert and oriented to person, place, and time.      Cranial Nerves: No cranial nerve deficit.   Psychiatric:         Behavior: Behavior normal.     Laboratory:  Recent Labs   Lab 07/08/23  0502 07/09/23  0438 07/10/23  0454    140 141   K 4.5 4.1 4.2    105 109   CO2 29 24 25   BUN 23 20 21   CREATININE 1.3 1.1 0.9    140* 124*         Recent Labs   Lab 07/08/23  0502 07/09/23  0438 07/10/23  0454   CALCIUM 9.4 9.3 9.0   ALBUMIN 3.9 3.9 3.9   PHOS 3.6 3.6 3.4   MG 1.7 1.8 1.7         Recent Labs   Lab 11/17/21  1110 05/19/22  0747   PTH, Intact 101.4 H 153.4 H         Recent Labs   Lab 07/08/23  0618 07/08/23  1130 07/08/23  1635 07/08/23  2108   POCTGLUCOSE 100 114* 208* 160*         Recent Labs   Lab 01/04/23  1023 04/26/23  1351 06/30/23  1117   Hemoglobin A1C 7.5 H 7.0 H 6.7 H         Recent Labs   Lab 07/08/23  0502 07/09/23  0438 07/10/23  0454   WBC 7.10 6.45 6.56   HGB 10.8* 11.3* 11.7*   HCT 34.6* 36.7* 37.9   * 152 151   MCV 88 88 90   MCHC 31.2* 30.8* 30.9*   MONO 9.0  0.6 8.7  0.6 7.8  0.5         Recent Labs   Lab 07/08/23  0502 07/09/23  0438 07/10/23  0454   BILITOT 0.5 0.7 0.8   PROT 7.4 7.4 7.2   ALBUMIN 3.9 3.9 3.9   ALKPHOS 113 107 87   ALT 64* 70* 68*   AST 70* 77* 75*         Recent Labs   Lab 06/02/21  1000 02/07/22  0750 04/09/22  1604 08/30/22  2115 01/04/23  1009 04/26/23  1352 05/08/23  0953 05/19/23  1021 06/30/23  1056 06/30/23  1123   Color, UA Yellow Yellow Yellow Yellow   < > Yellow  --  Yellow  --  Yellow   Appearance, UA Clear Cloudy A Clear Cloudy A   < > Hazy A  --  Hazy A  --  Clear   pH, UA 6.0 5.0 5.0 5.5   < > 7.0  --  6.0  --  6.0   Specific Gravity, UA 1.020 1.025 1.010 1.025   < > 1.010  --  1.020  --  1.020   Protein, UA Negative Negative Negative Trace A   < > Negative  --  Trace A  --  Negative    Glucose, UA Negative Negative Negative Negative   < > Negative  --  Negative  --  4+ A   Ketones, UA Negative Negative Negative Trace A   < > Negative  --  Negative  --  Negative   Urobilinogen, UA Negative  --  0.2 1.0  --   --   --   --   --   --    Bilirubin (UA) Negative Negative Negative Negative   < > Negative  --  Negative  --  Negative   Occult Blood UA Trace A Negative Negative Negative   < > Negative  --  Negative  --  Negative   Nitrite, UA Negative Negative Negative Negative   < > Negative  --  Positive A  --  Negative   RBC, UA 1 2 1  1 0  0  --  10 H   < > 2 2 1   WBC, UA 3 7 H 4  4 >100 H  >100 H  --  88 H   < > >100 H 12 H 12 H   Bacteria Rare Rare Negative  Negative Few A  Few A  --  Moderate A   < > Few A Rare Rare   Hyaline Casts, UA  --  1 1  1 3 A  3 A  --   --   --   --   --   --     < > = values in this interval not displayed.               Microbiology Results (last 7 days)       ** No results found for the last 168 hours. **            ASSESSMENT/PLAN:     s/p kidney transplant 2019  Chest pain with history of CAD with stents--s/p angiogram this am.  No report yet.  Pt states she was told no intervention needed.  DM  No NSAIDs, ACEI/ARB, IV contrast or other nephrotoxins.  Keep MAP > 60, SBP > 100.  Agree with IVF and plan for angiogram in AM by cards.  Hold Metformin and ARB before angiogram and for 48h after.  Control BGs with insulin.  Continue current immunosuppression.  Only on Prograf due to mycophenolate mofetil being held d/t recurrent ESBL UTIs Jan-Feb 2019 and also held 4/19 because of parvovirus B 19   Any contrast exposure will always be associated with elevated risk of SACHIN and/or dialysis - either short-term or long term. The potential risk can be diminished (but not entirely eliminated) by measures, incl avoidance of emergent procedures, limiting total single dose of contrast, spacing contrast exposures in time, pre-and post exposure hydration with NS or bicarb drip,  holding diuretics, ACEi, NSAIDs and other nephrotoxins pre and post procedure.     Anemia of CKD  Hgb and HCT are acceptable. Monitor for now.  Will provide MATHEUS and/or IV iron PRN.    MBD / Secondary HPT  Ca, Phos, PTH and vitamin D levels are acceptable.   Phos binders, vitamin D and analogues, calcimimetics will be given as needed.    HTN  BP seem controlled.   Tolerate asymptomatic HTN up to -160.  Continue home meds, hold ARB for angiogram.  Low sodium diet.    Thank you for allowing us to participate in the care of your patient!   We will follow the patient and provide recommendations as needed.    Patient care time was spent personally by me on the following activities:     Obtaining a history.  Examination of patient.  Providing medical care at the patients bedside.  Developing a treatment plan with patient or surrogate and bedside caregivers.  Ordering and reviewing laboratory studies, radiographic studies, pulse oximetry.  Ordering and performing treatments and interventions.  Evaluation of patient's response to treatment.  Discussions with consultants while on the unit and immediately available to the patient.  Re-evaluation of the patient's condition.  Documentation in the medical record.     Chapincito Tom MD    [facility]  [address] [place] [address]  MISSAEL Black [id number]    [phone] - tel  [phone] - fax    7/10/2023

## 2023-07-10 NOTE — PROGRESS NOTES
[facility]  Progress Note    Patient Name: Andra Newell  MRN: [id number]  Patient Class: OP- Observation   Admission Date: 7/7/2023  Length of Stay: 0 days  Attending Physician: Sabrina Momin MD  Primary Care Provider: Fady Vaughn MD        Subjective:     Principal Problem:Hypertension associated with stage 3 chronic kidney disease due to type 2 diabetes mellitus        HPI:  Andra Newell is a 63-year-old female with a past medical history of anemia, arthritis, DM type 2, depression, CAD S/P stents (08/2022), and S/P renal transplant (2019) who presents with chest pain and associated nausea and vomiting the past week.  Patient was seen in previous ER on 06/19/2023 with similar symptoms and workup was negative.  She was followed up with her cardiologist, , on 6/21/23 and scheduled for outpatient cardiac PET. Patient's most recent stress and echo were performed 04/2023.  Patient states she has been experiencing intermittent chest pain for 1 week and describes it as a heaviness.  Patient states she takes nitroglycerin daily and it will provide relief for a few minutes.  EKG showed normal sinus rhythm with no ST elevation or depression, troponin 0.007, AST 93, ALT 75, eGFR 42 (38.5 last week) and hemoglobin 11.7.  Chest x-ray showed no acute abnormality.  Patient was given nitro paste in ED with some relief.  Patient referred to Hospital Medicine and seen in the ED.  Patient reports chest pain continues to occur intermittently with no precipitating factors.  Patient also reports chest tenderness and states this is not new.  Patient reports vomiting earlier today and denies any further episodes.  Patient denies shortness of breath, nausea, diarrhea, fever and chills.  Patient will be admitted to Hospital Medicine for further evaluation and management.          Overview/Hospital Course:  Patient with a history of CAD status post PCI, history of  renal transplant, insulin-dependent diabetes mellitus, hypertension, depression, GERD, presented as transfer from [facility] for [facility].  Patient presented with left-sided chest pain/pressure associated with nausea and emesis, lattice symptoms have resolved but chest pressure persisted.  EKG no ST elevation, troponin flat, followed by outpatient cardiologist , and was plan for outpatient cardiac PET.  She also had recent EGD May 2023, widely patent scattered is ring lower esophagus status post Savary dilatation, biopsies negative for eosinophilic esophagitis, mild gastritis, negative H pylori, recommends b.i.d. PPI.  Previous echo with EF of 60 percent with mild to moderate aortic regurgitation.  On 07/10 underwent left heart catheterization by Dr. Hunter, reportedly no significant lesion, no intervention, alternate etiology for chest pain suspected.  Blood pressure still not controlled up to 180, home antihypertensives resumed and monitoring overnight.      Interval History:  See hospital course.  Still reporting left-sided chest pressure, constant, mild tenderness to palpation.  No further nausea or emesis but appetite remains low, no bowel movements in 5 days.  Reviewed home antihypertensives and will resume and monitor blood pressure.  Telemetry with sinus rhythm.  Blood pressure not well controlled.  Labs with hemoglobin 11.7, BUN/creatinine 21/0.9.  Echo with EF of 60 percent with mild to moderate aortic regurgitation.  Previous EGD report reviewed.  Discussed with patient.  Discussed with nursing.    Review of Systems   Constitutional:  Negative for fever.   Cardiovascular:  Positive for chest pain. Negative for leg swelling.   Gastrointestinal:  Positive for constipation.   Psychiatric/Behavioral:  Negative for confusion.    Objective:     Vital Signs (Most Recent):  Temp: 97 °F (36.1 °C) (07/10/23 1101)  Pulse: (!) 58 (07/10/23 1400)  Resp: (!) 21 (07/10/23 1400)  BP: (!) 187/91  (07/10/23 1400)  SpO2: 98 % (07/10/23 1400) Vital Signs (24h Range):  Temp:  [97 °F (36.1 °C)-98.5 °F (36.9 °C)] 97 °F (36.1 °C)  Pulse:  [50-73] 58  Resp:  [10-24] 21  SpO2:  [95 %-100 %] 98 %  BP: (132-197)/(62-91) 187/91     Weight: 91 kg (200 lb 9.9 oz)  Body mass index is 31.42 kg/m².    Intake/Output Summary (Last 24 hours) at 7/10/2023 1559  Last data filed at 7/10/2023 1200  Gross per 24 hour   Intake 2100.3 ml   Output --   Net 2100.3 ml         Physical Exam  Vitals and nursing note reviewed.   Constitutional:       General: She is not in acute distress.     Appearance: She is not ill-appearing.   HENT:      Head: Normocephalic and atraumatic.      Mouth/Throat:      Mouth: Mucous membranes are moist.   Cardiovascular:      Rate and Rhythm: Normal rate and regular rhythm.      Heart sounds: Murmur heard.   Pulmonary:      Effort: No respiratory distress.      Breath sounds: No stridor. No wheezing.      Comments: On RA  Abdominal:      Palpations: Abdomen is soft.      Tenderness: There is no abdominal tenderness.   Skin:     Comments: R groin angio access site cdi dressing   Neurological:      Mental Status: She is alert and oriented to person, place, and time.   Psychiatric:         Mood and Affect: Mood normal.           Significant Labs: BMP:   Recent Labs   Lab 07/10/23  0454   *      K 4.2      CO2 25   BUN 21   CREATININE 0.9   CALCIUM 9.0   MG 1.7     CBC:   Recent Labs   Lab 07/09/23  0438 07/10/23  0454   WBC 6.45 6.56   HGB 11.3* 11.7*   HCT 36.7* 37.9    151     CMP:   Recent Labs   Lab 07/09/23  0438 07/10/23  0454    141   K 4.1 4.2    109   CO2 24 25   * 124*   BUN 20 21   CREATININE 1.1 0.9   CALCIUM 9.3 9.0   PROT 7.4 7.2   ALBUMIN 3.9 3.9   BILITOT 0.7 0.8   ALKPHOS 107 87   AST 77* 75*   ALT 70* 68*   ANIONGAP 11 7*     Cardiac Markers: No results for input(s): CKMB, MYOGLOBIN, BNP, TROPISTAT in the last 48 hours.  Magnesium:   Recent Labs    Lab 07/09/23  0438 07/10/23  0454   MG 1.8 1.7     POCT Glucose:   Recent Labs   Lab 07/08/23  1635 07/08/23  2108   POCTGLUCOSE 208* 160*       Significant Imaging: I have reviewed all pertinent imaging results/findings within the past 24 hours.      Assessment/Plan:      Active Hospital Problems    Diagnosis    *Hypertension, not well controlled    Chest pain    CAD s/p PCI    Aortic regurgitation, mild to moderate    Stage 3a chronic kidney disease    Hypothyroidism    Kidney transplant recipient     Left kidney transplant 1/14/2019      Long-term use of immunosuppressant medication    Gastroesophageal reflux disease without esophagitis    Anemia of chronic disease     Hgb stable. No overt blood loss  Monitor  Transfuse > 7/28      Mixed hyperlipidemia    Bilateral low back pain without sciatica    MDD (major depressive disorder), recurrent episode, mild    Controlled type 2 diabetes mellitus with complication, with long-term current use of insulin, HbA1c 6.7 percent     Plan:  Continue care on telemetry floor with continuous cardiac monitoring   Resume home antihypertensives including amlodipine, losartan, cardura, continue metoprolol and Imdur, monitor and adjust as needed  Continue stool softeners and follow bowel movement   Recent EGD with widely patent discussed case ring, biopsies negative for EE, H pylori   On 07/10 underwent left heart catheterization, reported no lesions, no interventions   Previous echo with EF of 60 percent with mild-to-moderate aortic regurgitation   Continue basal bolus insulin with Accu-Cheks, seen hypoglycemic measures   Electrolytes sliding scale repletion   A.m. labs ordered  Appreciate Cardiology input   Further plan as per hospital course    VTE Risk Mitigation (From admission, onward)         Ordered     IP VTE HIGH RISK PATIENT  Once         07/08/23 0213     Place sequential compression device  Until discontinued         07/08/23 0213     Reason for No  Pharmacological VTE Prophylaxis  Once        Question:  Reasons:  Answer:  Patient is Ambulatory    07/08/23 0213                Discharge Planning   DYANA: 7/10/2023     Code Status: Full Code   Is the patient medically ready for discharge?:     Reason for patient still in hospital (select all that apply): Patient trending condition  Discharge Plan A: Home with family                  Sabrina Momin MD  Department of Hospital Medicine   [facility]

## 2023-07-10 NOTE — CARE UPDATE
Patient transferred from [facility] in stable condition.    HPI by Dr. Marte:  Andra Newell is a 63-year-old female with a past medical history of anemia, arthritis, DM type 2, depression, CAD S/P stents (08/2022), and S/P renal transplant (2019) who presents with chest pain and associated nausea and vomiting the past week.  Patient was seen in previous ER on 06/19/2023 with similar symptoms and workup was negative.  She was followed up with her cardiologist, , on 6/21/23 and scheduled for outpatient cardiac PET. Patient's most recent stress and echo were performed 04/2023.  Patient states she has been experiencing intermittent chest pain for 1 week and describes it as a heaviness.  Patient states she takes nitroglycerin daily and it will provide relief for a few minutes.  EKG showed normal sinus rhythm with no ST elevation or depression, troponin 0.007, AST 93, ALT 75, eGFR 42 (38.5 last week) and hemoglobin 11.7.  Chest x-ray showed no acute abnormality.  Patient was given nitro paste in ED with some relief.  Patient referred to Hospital Medicine and seen in the ED.  Patient reports chest pain continues to occur intermittently with no precipitating factors.  Patient also reports chest tenderness and states this is not new.  Patient reports vomiting earlier today and denies any further episodes.  Patient denies shortness of breath, nausea, diarrhea, fever and chills.  Patient will be admitted to Hospital Medicine for further evaluation and management.    Plan for limited echo to evaluate for pericarditis, Transferred for angiogram by Dr. Hunter Monday morning    Continue current care.

## 2023-07-10 NOTE — PLAN OF CARE
CM sent in basket messages to PCP and Dr. Werner (cardiology) to contact pt to schedule hospital follow up appts. Added to AVS.

## 2023-07-10 NOTE — TELEPHONE ENCOUNTER
----- Message from Adolfo Steel MD sent at 7/7/2023 12:40 PM CDT -----  She should have a repeat RP in the next week or two.

## 2023-07-10 NOTE — CONSULTS
[facility]  Department of Cardiology  Consult Note      PATIENT NAME: Andra Newell    MRN: [id number]  TODAY'S DATE: 07/10/2023  ADMIT DATE: 7/7/2023                          CONSULT REQUESTED BY: Sabrina Momin MD    SUBJECTIVE     PRINCIPAL PROBLEM: Chest pain      REASON FOR CONSULT:  From H&P: Patient transferred from [facility] in [facility] condition.     HPI by Dr. Marte:  Andra Newell is a 63-year-old female with a past medical history of anemia, arthritis, DM type 2, depression, CAD S/P stents (08/2022), and S/P renal transplant (2019) who presents with chest pain and associated nausea and vomiting the past week.  Patient was seen in previous ER on 06/19/2023 with similar symptoms and workup was negative.  She was followed up with her cardiologist, , on 6/21/23 and scheduled for outpatient cardiac PET. Patient's most recent stress and echo were performed 04/2023.  Patient states she has been experiencing intermittent chest pain for 1 week and describes it as a heaviness.  Patient states she takes nitroglycerin daily and it will provide relief for a few minutes.  EKG showed normal sinus rhythm with no ST elevation or depression, troponin 0.007, AST 93, ALT 75, eGFR 42 (38.5 last week) and hemoglobin 11.7.  Chest x-ray showed no acute abnormality.  Patient was given nitro paste in ED with some relief.  Patient referred to Hospital Medicine and seen in the ED.  Patient reports chest pain continues to occur intermittently with no precipitating factors.  Patient also reports chest tenderness and states this is not new.  Patient reports vomiting earlier today and denies any further episodes.  Patient denies shortness of breath, nausea, diarrhea, fever and chills.  Patient will be admitted to Hospital Medicine for further evaluation and management.     Plan for limited echo to evaluate for pericarditis, Transferred for angiogram by Dr. Hunter Monday morning     Continue  "current care.       HPI:    Patient is a 63-year-old female who presented to the emergency room with complaints of chest discomfort, nausea, and vomiting.  She is a history of coronary artery disease with a stent in 2022.  Due to ongoing concerning symptoms of chest discomfort she was transferred to [facility] for left heart catheterization.        Review of patient's allergies indicates:   Allergen Reactions    Dilaudid [hydromorphone] Itching    Morphine Itching    Torsemide Diarrhea     Diarrhea stopped once discontinued med    Codeine Itching     Can take oxycodone and hydrocodone with Benadryl       Past Medical History:   Diagnosis Date    Anemia     Anemia in chronic kidney disease, on chronic dialysis 7/12/2017    Anxiety and depression     Aplastic anemia with parvovirus B19 infection 5/27/2019    Aplastic anemia with parvovirus B19 infection 4/2019 5/27/2019    Arthritis     Asthma     allergic airway    CKD stage III (moderate) 2/18/2019    Colon polyp     Depression     Diabetes mellitus     Diverticulosis     Encounter for blood transfusion     Eosinophilia     ESRD (end stage renal disease)     stage V, due for living donor 9/2018    ESRD on dialysis     GERD (gastroesophageal reflux disease)     Gout     Gout     Hyperlipidemia     Hypertension     Hypertriglyceridemia without hypercholesterolemia 6/9/2019    Low back pain     Low HDL (under 40) 6/9/2019    MRSA carrier     Neutropenia, unspecified 5/8/2019    Obesity     Renal disorder     Rotator cuff syndrome--left     Thyroid disease     Ulnar neuropathy of right upper extremity     Uncontrolled type 2 diabetes mellitus with hyperglycemia      Past Surgical History:   Procedure Laterality Date    ACHILLES TENDON SURGERY Left 05/2015    BACK SURGERY      CERVICAL SPINE SURGERY  2021    cervical gate placed by Dr. Vera- screws,plate, and "gate"    CHOLECYSTECTOMY      COLONOSCOPY N/A 09/06/2017    Procedure: COLONOSCOPY;  Surgeon: " Marisol Bryson MD;  Location: [facility];  Service: Endoscopy;  Laterality: N/A; REPEAT IN 3 YEARS FOR SURVEILLANCE    COLONOSCOPY N/A 05/09/2019    Procedure: COLONOSCOPY;  Surgeon: Fady Ewing MD;  Location: [facility] ENDO (2ND FLR);  Service: Endoscopy;  Laterality: N/A;    CORONARY ANGIOGRAPHY  08/31/2022    Procedure: ANGIOGRAM, CORONARY ARTERY;  Surgeon: Christoph Dang MD;  Location: [facility];  Service: Cardiology;;    DIALYSIS FISTULA CREATION  12/2017    ESOPHAGOGASTRODUODENOSCOPY N/A 05/09/2019    Procedure: EGD (ESOPHAGOGASTRODUODENOSCOPY);  Surgeon: Fady Ewing MD;  Location: [facility] ENDO (2ND FLR);  Service: Endoscopy;  Laterality: N/A;    ESOPHAGOGASTRODUODENOSCOPY N/A 06/10/2021    Procedure: EGD (ESOPHAGOGASTRODUODENOSCOPY);  Surgeon: Marisol Bryson MD;  Location: [facility];  Service: Endoscopy;  Laterality: N/A;    ESOPHAGOGASTRODUODENOSCOPY N/A 5/31/2023    Procedure: EGD (ESOPHAGOGASTRODUODENOSCOPY);  Surgeon: Marisol Bryson MD;  Location: [facility];  Service: Endoscopy;  Laterality: N/A;    FRACTURE SURGERY  1990    Ankle knee    HEMORRHOID SURGERY      INJECTION OF ANESTHETIC AGENT AROUND MEDIAL BRANCH NERVES INNERVATING LUMBAR FACET JOINT Right 09/25/2019    Procedure: Block-nerve-medial branch-lumbar;  Surgeon: Yonny Ferrer MD;  Location: [facility] OR;  Service: Pain Management;  Laterality: Right;  L2, 3, 4,5     INJECTION OF ANESTHETIC AGENT AROUND MEDIAL BRANCH NERVES INNERVATING LUMBAR FACET JOINT Right 10/16/2019    Procedure: Block-nerve-medial branch-lumbar;  Surgeon: Yonny Ferrer MD;  Location: [facility] OR;  Service: Pain Management;  Laterality: Right;  L2, 3, 4,5     JOINT REPLACEMENT      left    KIDNEY TRANSPLANT N/A 01/14/2019    Procedure: TRANSPLANT, KIDNEY;  Surgeon: Roland Salazar MD;  Location: [facility] OR 2ND FLR;  Service: Transplant;  Laterality: N/A;    KNEE SURGERY      RADIOFREQUENCY ABLATION OF LUMBAR MEDIAL BRANCH NERVE AT SINGLE LEVEL Right 10/29/2019    Procedure:  Radiofrequency Ablation, Nerve, Spinal, Lumbar, Medial Branch, 1 Level;  Surgeon: Yonny Ferrer MD;  [facility];  Service: Pain Management;  Laterality: Right;  L2, 3, 4, 5  burned at 80 degrees C X 60 seconds each site X 2    SHOULDER ARTHROSCOPY      SHOULDER SURGERY      SPINE SURGERY  2020    Pain pump in back    UPPER GASTROINTESTINAL ENDOSCOPY  ~2013    Dr. Moralez     Social History     Tobacco Use    Smoking status: Never    Smokeless tobacco: Never   Substance Use Topics    Alcohol use: No     Alcohol/week: 0.0 standard drinks    Drug use: No        REVIEW OF SYSTEMS  Per HPI    OBJECTIVE     VITAL SIGNS (Most Recent)  Temp: 98.5 °F (36.9 °C) (07/10/23 0701)  Pulse: 73 (07/10/23 0815)  Resp: 15 (07/10/23 0815)  BP: (!) 181/81 (07/10/23 0815)  SpO2: 98 % (07/10/23 0817)    VENTILATION STATUS  Resp: 15 (07/10/23 0815)  SpO2: 98 % (07/10/23 0817)           I & O (Last 24H):  Intake/Output Summary (Last 24 hours) at 7/10/2023 0924  Last data filed at 7/10/2023 0701  Gross per 24 hour   Intake 1526.97 ml   Output --   Net 1526.97 ml       WEIGHTS  Wt Readings from Last 1 Encounters:   07/09/23 2000 91 kg (200 lb 9.9 oz)   07/09/23 0443 90 kg (198 lb 6.6 oz)   07/08/23 1112 90.6 kg (199 lb 11.2 oz)   07/07/23 1727 90.7 kg (200 lb)       PHYSICAL EXAM  CONSTITUTIONAL: No fever, no chills  HEENT: Normocephalic, atraumatic,pupils reactive to light                 NECK:  No JVD no carotid bruit  CVS: S1S2+, RRR  LUNGS: Clear  ABDOMEN: Soft, NT, BS+  EXTREMITIES: No cyanosis, edema  : No bull catheter  NEURO: AAO X 3  PSY: Normal affect      HOME MEDICATIONS:  No current facility-administered medications on file prior to encounter.     Current Outpatient Medications on File Prior to Encounter   Medication Sig Dispense Refill    levothyroxine (SYNTHROID) 75 MCG tablet TAKE ONE TABLET BY MOUTH ONCE DAILY 90 tablet 3    magnesium oxide 500 mg Tab Take 500 mg by mouth once daily.  0    metFORMIN (GLUCOPHAGE-XR)  "500 MG ER 24hr tablet Take 1 tablet (500 mg total) by mouth 2 (two) times daily with meals. 180 tablet 1    metoprolol tartrate (LOPRESSOR) 25 MG tablet Take 0.5 tablets (12.5 mg total) by mouth 2 (two) times daily. 30 tablet 1    acetaminophen (TYLENOL) 325 MG tablet Take 2 tablets (650 mg total) by mouth every 8 (eight) hours as needed for Pain.  0    amLODIPine (NORVASC) 5 MG tablet Take 1 tablet by mouth once daily.      aspirin (ECOTRIN) 81 MG EC tablet Take 1 tablet (81 mg total) by mouth once daily. 30 tablet 0    BD LENIN 2ND GEN PEN NEEDLE 32 gauge x 5/32" Ndle Use up to four daily to inject novolog (Patient not taking: Reported on 6/26/2023) 400 each 3    blood sugar diagnostic Strp Check glucose 3 (three) times daily. (Patient not taking: Reported on 6/26/2023) 300 each PRN    blood-glucose sensor (DEXCOM G7 SENSOR) Amparo Change every 10 days (Patient not taking: Reported on 6/26/2023) 9 each 4    clopidogreL (PLAVIX) 75 mg tablet TAKE ONE TABLET BY MOUTH DAILY 90 tablet 3    colchicine (COLCRYS) 0.6 mg tablet Take 1 tablet (0.6 mg total) by mouth 2 (two) times daily. (Patient not taking: Reported on 6/26/2023) 180 tablet 3    DEXCOM G7  Misc Dispense 1 (Patient not taking: Reported on 6/26/2023) 1 each 0    doxazosin (CARDURA) 2 MG tablet Take 2 mg by mouth once daily.      DULoxetine (CYMBALTA) 30 MG capsule Take 30 mg by mouth once daily.      empagliflozin (JARDIANCE) 10 mg tablet Take 1 tablet (10 mg total) by mouth once daily. 30 tablet 3    famotidine (PEPCID) 40 MG tablet Take 1 tablet (40 mg total) by mouth every evening. 30 tablet 6    flash glucose sensor (FREESTYLE DAKOTAH 14 DAY SENSOR) Kit Use 1 sensor every 14 days to check blood glucose (Patient not taking: Reported on 6/26/2023) 6 kit 3    insulin aspart U-100 (NOVOLOG FLEXPEN U-100 INSULIN) 100 unit/mL (3 mL) InPn pen 32 u breakfast and supper, 28 u lunch + correction. Max TDD 140u 45 mL 11    insulin degludec (TRESIBA FLEXTOUCH " U-200) 200 unit/mL (3 mL) insulin pen Inject 76 Units into the skin once daily. 12 pen 3    irbesartan (AVAPRO) 150 MG tablet Take 1 tablet by mouth once daily.      isosorbide mononitrate (IMDUR) 30 MG 24 hr tablet TAKE ONE TABLET BY MOUTH DAILY 90 tablet 3    lancets (ONETOUCH ULTRASOFT LANCETS) Misc Use to test blood sugar 3 x's a day (Patient not taking: Reported on 6/26/2023) 300 each 3    lansoprazole (PREVACID) 30 MG capsule Take 1 capsule (30 mg total) by mouth 2 (two) times a day. 60 capsule 1    LIDOcaine (LIDODERM) 5 % 1 patch.      LORazepam (ATIVAN) 1 MG tablet Take 1 mg by mouth every evening.      losartan (COZAAR) 50 MG tablet Take 1 tablet (50 mg total) by mouth once daily. 90 tablet 3    multivitamin (THERAGRAN) tablet Take 1 tablet by mouth once daily.      nitroGLYCERIN (NITROSTAT) 0.4 MG SL tablet Place 1 tablet (0.4 mg total) under the tongue every 5 (five) minutes as needed for Chest pain. (Patient not taking: Reported on 6/26/2023) 20 tablet 1    ondansetron (ZOFRAN) 4 MG tablet Take 1 tablet (4 mg total) by mouth every 6 (six) hours. (Patient not taking: Reported on 6/26/2023) 12 tablet 0    sucralfate (CARAFATE) 1 gram tablet Take 1 tablet (1 g total) by mouth every 6 (six) hours as needed (GERD). 40 tablet 1    tacrolimus (PROGRAF) 1 MG Cap Take 1 capsule (1 mg total) by mouth every 12 (twelve) hours. 60 capsule 11    tiZANidine (ZANAFLEX) 4 MG tablet Take 1 tablet by mouth every evening.      vilazodone (VIIBRYD) 40 mg Tab tablet Take 1 tablet (40 mg total) by mouth once daily. 30 tablet 4    VRAYLAR 3 mg Cap Take 3 mg by mouth once daily.         SCHEDULED MEDS:   aspirin  81 mg Oral Daily    clopidogreL  75 mg Oral Daily    famotidine  40 mg Oral Daily    isosorbide mononitrate  30 mg Oral Daily    levothyroxine  75 mcg Oral Daily    metoprolol tartrate  12.5 mg Oral BID    polyethylene glycol  17 g Oral BID    senna-docusate 8.6-50 mg  1 tablet Oral BID    tacrolimus  1 mg Oral BID     vilazodone  40 mg Oral Daily       CONTINUOUS INFUSIONS:   sodium chloride 0.9% 100 mL/hr at 07/09/23 2209       PRN MEDS:acetaminophen, aluminum-magnesium hydroxide-simethicone, bisacodyL, dextrose 50%, dextrose 50%, fentaNYL, glucagon (human recombinant), glucose, glucose, hydrALAZINE, insulin aspart U-100, LIDOcaine HCL 10 mg/ml (1%), LORazepam, melatonin, midazolam, naloxone, nitroGLYCERIN, ondansetron, prochlorperazine, simethicone, sodium chloride 0.9%, sodium chloride 0.9%    LABS AND DIAGNOSTICS     CBC LAST 3 DAYS  Recent Labs   Lab 07/08/23  0502 07/09/23  0438 07/10/23  0454   WBC 7.10 6.45 6.56   RBC 3.93* 4.17 4.21   HGB 10.8* 11.3* 11.7*   HCT 34.6* 36.7* 37.9   MCV 88 88 90   MCH 27.5 27.1 27.8   MCHC 31.2* 30.8* 30.9*   RDW 15.7* 15.7* 15.9*   * 152 151   MPV 9.9 10.4 10.4   GRAN 48.7  3.5 53.0  3.4 53.3  3.5   LYMPH 30.8  2.2 25.4  1.6 27.9  1.8   MONO 9.0  0.6 8.7  0.6 7.8  0.5   BASO 0.05 0.06 0.04   NRBC 0 0 0       COAGULATION LAST 3 DAYS  No results for input(s): LABPT, INR, APTT in the last 168 hours.    CHEMISTRY LAST 3 DAYS  Recent Labs   Lab 07/08/23  0502 07/09/23  0438 07/10/23  0454    140 141   K 4.5 4.1 4.2    105 109   CO2 29 24 25   ANIONGAP 11 11 7*   BUN 23 20 21   CREATININE 1.3 1.1 0.9    140* 124*   CALCIUM 9.4 9.3 9.0   MG 1.7 1.8 1.7   ALBUMIN 3.9 3.9 3.9   PROT 7.4 7.4 7.2   ALKPHOS 113 107 87   ALT 64* 70* 68*   AST 70* 77* 75*   BILITOT 0.5 0.7 0.8       CARDIAC PROFILE LAST 3 DAYS  Recent Labs   Lab 07/07/23 2015 07/07/23  2131 07/08/23  0228 07/08/23  0502 07/10/23  0454   BNP 18  --   --   --   --    TROPONINI 0.007 0.007 0.016 0.006  --    TROPONINIHS  --   --   --   --  9.3       ENDOCRINE LAST 3 DAYS  Recent Labs   Lab 07/08/23  1433   TSH 0.859       LAST ARTERIAL BLOOD GAS  ABG  No results for input(s): PH, PO2, PCO2, HCO3, BE in the last 168 hours.    LAST 7 DAYS MICROBIOLOGY   Microbiology Results (last 7 days)       ** No  results found for the last 168 hours. **            MOST RECENT IMAGING  X-Ray Chest AP Portable  Narrative: EXAMINATION:  XR CHEST AP PORTABLE    CLINICAL HISTORY:  Chest Pain;    TECHNIQUE:  Single frontal view of the chest was performed.    COMPARISON:  06/19/2023, 04/10/2023, 09/13/2022    FINDINGS:  Cardiomediastinal silhouette is stable and not significantly enlarged.  There is no evidence of acute lung parenchymal or pleural abnormality.  Degenerative changes of the spine and shoulders as well as postoperative changes of the cervical spine with hardware again noted.  Impression: No acute or significant focal abnormality involving the chest.    Electronically signed by: Hien Malik  Date:    07/07/2023  Time:    19:50      ECHOCARDIOGRAM RESULTS (last 5)  Results for orders placed in visit on 04/05/23    Echo    Interpretation Summary  · The left ventricle is normal in size with concentric hypertrophy and normal systolic function.  · The estimated ejection fraction is 60%.  · Normal left ventricular diastolic function.  · Normal right ventricular size with normal right ventricular systolic function.  · There is mild aortic valve stenosis.  · Aortic valve area is 1.70 cm2; peak velocity is 2.81 m/s; mean gradient is 17 mmHg.  · Normal central venous pressure (3 mmHg).      Results for orders placed during the hospital encounter of 09/02/22    Echo    Interpretation Summary  · The left ventricle is normal in size with concentric hypertrophy and normal systolic function.  · The estimated ejection fraction is 60%.  · Normal right ventricular size with normal right ventricular systolic function.      Results for orders placed during the hospital encounter of 08/30/22    Echo Saline Bubble? No    Interpretation Summary  · The left ventricle is normal in size with mild concentric hypertrophy and normal systolic function.  · The estimated ejection fraction is 60%.  · Indeterminate left ventricular diastolic  function.  · Normal right ventricular size with normal right ventricular systolic function.  · Moderate left atrial enlargement.  · Mild aortic regurgitation.  · Normal central venous pressure (3 mmHg).  · The estimated PA systolic pressure is 25 mmHg.      Results for orders placed during the hospital encounter of 06/02/21    Echo Saline Bubble? Yes    Interpretation Summary  · The left ventricle is normal in size with normal systolic function.  · The estimated ejection fraction is 70%.  · Indeterminate left ventricular diastolic function.  · Normal right ventricular size with normal right ventricular systolic function.  · Mild aortic regurgitation.  · No interatrial septal defect present.      CURRENT/PREVIOUS VISIT EKG  Results for orders placed or performed in visit on 06/21/23   IN OFFICE EKG 12-LEAD (to [place])    Collection Time: 06/21/23  9:22 AM    Narrative    Test Reason : z00.0    Vent. Rate : 073 BPM     Atrial Rate : 073 BPM     P-R Int : 164 ms          QRS Dur : 100 ms      QT Int : 402 ms       P-R-T Axes : 037 -62 077 degrees     QTc Int : 442 ms    Normal sinus rhythm  Incomplete right bundle branch block  LVH with repolarization abnormality ( R in aVL , Goodrich product ,   Romhilt-Medina )  Abnormal ECG  When compared with ECG of 19-JUN-2023 15:58,  Incomplete right bundle branch block is now Present  Confirmed by Debbi MARTINEZ, Bryant AGUIAR ([id number]) on 6/21/2023 1:26:05 PM    Referred By:             Confirmed By:Bryant Werner MD           ASSESSMENT/PLAN:     Active Hospital Problems    Diagnosis    *Chest pain    Hypertension associated with stage 3 chronic kidney disease due to type 2 diabetes mellitus    Acquired hypothyroidism    Kidney transplant recipient     Left kidney transplant 1/14/2019      Gastroesophageal reflux disease without esophagitis    Anemia of chronic disease     Hgb stable. No overt blood loss  Monitor  Transfuse > 7/28      Mixed hyperlipidemia    Bilateral low back pain  without sciatica    MDD (major depressive disorder), recurrent episode, mild    Controlled type 2 diabetes mellitus with complication, with long-term current use of insulin       ASSESSMENT & PLAN:     Chest pain  Unstable angina  CAD with hx of stent 2022  GERD  Hx of kidney transplant  Type II DM  HTN  Hyperlipidemia       RECOMMENDATIONS:    Patient for [facility] today. Seen and interviewed in the cath lab by Dr. Hunter.   Further recommendations to follow.         Rachele Lara NP  Date of Service: 07/10/2023  9:24 AM     I have personally interviewed and examined the patient, I have reviewed the Nurse Practitioner's history and physical, assessment, and plan. I have personally evaluated the patient at bedside and agree with the findings and made appropriate changes as necessary in recommendations.    Tolerated cath well  52 cc contrast.   No significant lesions, and patent stent in Diagonal.     Non cardiac source to chest discomfort.  OK TO d/c anfer hydration and check cr in am    Ajay Hunter MD  Department of Cardiology  [facility]  07/10/2023 9:25 AM

## 2023-07-10 NOTE — TELEPHONE ENCOUNTER
Patient is in the  hospital at [facility].She was admitted on the 7th. I am sending this message to .

## 2023-07-11 VITALS
HEART RATE: 66 BPM | HEIGHT: 67 IN | TEMPERATURE: 98 F | DIASTOLIC BLOOD PRESSURE: 58 MMHG | OXYGEN SATURATION: 96 % | SYSTOLIC BLOOD PRESSURE: 117 MMHG | BODY MASS INDEX: 31.49 KG/M2 | RESPIRATION RATE: 17 BRPM | WEIGHT: 200.63 LBS

## 2023-07-11 LAB
ALBUMIN SERPL BCP-MCNC: 3.8 G/DL (ref 3.5–5.2)
ALP SERPL-CCNC: 85 U/L (ref 55–135)
ALT SERPL W/O P-5'-P-CCNC: 57 U/L (ref 10–44)
ANION GAP SERPL CALC-SCNC: 4 MMOL/L (ref 8–16)
AST SERPL-CCNC: 53 U/L (ref 10–40)
BASOPHILS # BLD AUTO: 0.02 K/UL (ref 0–0.2)
BASOPHILS NFR BLD: 0.4 % (ref 0–1.9)
BILIRUB SERPL-MCNC: 0.8 MG/DL (ref 0.1–1)
BUN SERPL-MCNC: 19 MG/DL (ref 8–23)
CALCIUM SERPL-MCNC: 9 MG/DL (ref 8.7–10.5)
CHLORIDE SERPL-SCNC: 110 MMOL/L (ref 95–110)
CO2 SERPL-SCNC: 26 MMOL/L (ref 23–29)
CREAT SERPL-MCNC: 1 MG/DL (ref 0.5–1.4)
DIFFERENTIAL METHOD: ABNORMAL
EOSINOPHIL # BLD AUTO: 0.4 K/UL (ref 0–0.5)
EOSINOPHIL NFR BLD: 7.5 % (ref 0–8)
ERYTHROCYTE [DISTWIDTH] IN BLOOD BY AUTOMATED COUNT: 15.9 % (ref 11.5–14.5)
EST. GFR  (NO RACE VARIABLE): >60 ML/MIN/1.73 M^2
GLUCOSE SERPL-MCNC: 136 MG/DL (ref 70–110)
HCT VFR BLD AUTO: 33.6 % (ref 37–48.5)
HGB BLD-MCNC: 10.5 G/DL (ref 12–16)
IMM GRANULOCYTES # BLD AUTO: 0.02 K/UL (ref 0–0.04)
IMM GRANULOCYTES NFR BLD AUTO: 0.4 % (ref 0–0.5)
LYMPHOCYTES # BLD AUTO: 1.5 K/UL (ref 1–4.8)
LYMPHOCYTES NFR BLD: 27.5 % (ref 18–48)
MAGNESIUM SERPL-MCNC: 1.7 MG/DL (ref 1.6–2.6)
MCH RBC QN AUTO: 27.5 PG (ref 27–31)
MCHC RBC AUTO-ENTMCNC: 31.3 G/DL (ref 32–36)
MCV RBC AUTO: 88 FL (ref 82–98)
MONOCYTES # BLD AUTO: 0.5 K/UL (ref 0.3–1)
MONOCYTES NFR BLD: 8.4 % (ref 4–15)
NEUTROPHILS # BLD AUTO: 3.1 K/UL (ref 1.8–7.7)
NEUTROPHILS NFR BLD: 55.8 % (ref 38–73)
NRBC BLD-RTO: 0 /100 WBC
PHOSPHATE SERPL-MCNC: 3.7 MG/DL (ref 2.7–4.5)
PLATELET # BLD AUTO: 138 K/UL (ref 150–450)
PMV BLD AUTO: 10.4 FL (ref 9.2–12.9)
POTASSIUM SERPL-SCNC: 4 MMOL/L (ref 3.5–5.1)
PROT SERPL-MCNC: 7 G/DL (ref 6–8.4)
RBC # BLD AUTO: 3.82 M/UL (ref 4–5.4)
SODIUM SERPL-SCNC: 140 MMOL/L (ref 136–145)
WBC # BLD AUTO: 5.5 K/UL (ref 3.9–12.7)

## 2023-07-11 PROCEDURE — 80053 COMPREHEN METABOLIC PANEL: CPT | Performed by: NURSE PRACTITIONER

## 2023-07-11 PROCEDURE — 94761 N-INVAS EAR/PLS OXIMETRY MLT: CPT

## 2023-07-11 PROCEDURE — 25000003 PHARM REV CODE 250: Performed by: HOSPITALIST

## 2023-07-11 PROCEDURE — 83735 ASSAY OF MAGNESIUM: CPT | Performed by: NURSE PRACTITIONER

## 2023-07-11 PROCEDURE — 84100 ASSAY OF PHOSPHORUS: CPT | Performed by: NURSE PRACTITIONER

## 2023-07-11 PROCEDURE — 63600175 PHARM REV CODE 636 W HCPCS: Performed by: NURSE PRACTITIONER

## 2023-07-11 PROCEDURE — 25000003 PHARM REV CODE 250: Performed by: NURSE PRACTITIONER

## 2023-07-11 PROCEDURE — 25000003 PHARM REV CODE 250: Performed by: INTERNAL MEDICINE

## 2023-07-11 PROCEDURE — 85025 COMPLETE CBC W/AUTO DIFF WBC: CPT | Performed by: NURSE PRACTITIONER

## 2023-07-11 PROCEDURE — G0378 HOSPITAL OBSERVATION PER HR: HCPCS

## 2023-07-11 PROCEDURE — 36415 COLL VENOUS BLD VENIPUNCTURE: CPT | Performed by: NURSE PRACTITIONER

## 2023-07-11 RX ORDER — AMLODIPINE BESYLATE 5 MG/1
5 TABLET ORAL DAILY
Status: DISCONTINUED | OUTPATIENT
Start: 2023-07-11 | End: 2023-07-11 | Stop reason: HOSPADM

## 2023-07-11 RX ORDER — DOXAZOSIN 1 MG/1
2 TABLET ORAL DAILY
Status: DISCONTINUED | OUTPATIENT
Start: 2023-07-11 | End: 2023-07-11 | Stop reason: HOSPADM

## 2023-07-11 RX ORDER — LOSARTAN POTASSIUM 50 MG/1
50 TABLET ORAL DAILY
Status: DISCONTINUED | OUTPATIENT
Start: 2023-07-11 | End: 2023-07-11 | Stop reason: HOSPADM

## 2023-07-11 RX ADMIN — DOXAZOSIN 2 MG: 1 TABLET ORAL at 08:07

## 2023-07-11 RX ADMIN — ASPIRIN 81 MG: 81 TABLET, COATED ORAL at 08:07

## 2023-07-11 RX ADMIN — ISOSORBIDE MONONITRATE 30 MG: 30 TABLET, EXTENDED RELEASE ORAL at 08:07

## 2023-07-11 RX ADMIN — LEVOTHYROXINE SODIUM 75 MCG: 0.03 TABLET ORAL at 05:07

## 2023-07-11 RX ADMIN — LOSARTAN POTASSIUM 50 MG: 50 TABLET, FILM COATED ORAL at 08:07

## 2023-07-11 RX ADMIN — CLOPIDOGREL BISULFATE 75 MG: 75 TABLET, FILM COATED ORAL at 08:07

## 2023-07-11 RX ADMIN — AMLODIPINE BESYLATE 5 MG: 5 TABLET ORAL at 08:07

## 2023-07-11 RX ADMIN — FAMOTIDINE 40 MG: 20 TABLET ORAL at 08:07

## 2023-07-11 RX ADMIN — VILAZODONE HYDROCHLORIDE 40 MG: 40 TABLET ORAL at 08:07

## 2023-07-11 RX ADMIN — TACROLIMUS 1 MG: 1 CAPSULE ORAL at 10:07

## 2023-07-11 RX ADMIN — METOPROLOL TARTRATE 12.5 MG: 25 TABLET, FILM COATED ORAL at 09:07

## 2023-07-11 RX ADMIN — SENNOSIDES AND DOCUSATE SODIUM 1 TABLET: 50; 8.6 TABLET ORAL at 08:07

## 2023-07-11 NOTE — DISCHARGE SUMMARY
[facility] Medicine  Discharge Summary      Patient Name: Andra Newell  MRN: [id number]  MARICARMEN: [id number]  Patient Class: OP- Observation  Admission Date: 7/7/2023  Hospital Length of Stay: 0 days  Discharge Date and Time: 7/11/2023  3:00 PM  Attending Physician: No att. providers found   Discharging Provider: Sabrina Momin MD  Primary Care Provider: Fady Vaughn MD    Primary Care Team: [facility] MEDICINE 1    HPI:   Andra Newell is a 63-year-old female with a past medical history of anemia, arthritis, DM type 2, depression, CAD S/P stents (08/2022), and S/P renal transplant (2019) who presents with chest pain and associated nausea and vomiting the past week.  Patient was seen in previous ER on 06/19/2023 with similar symptoms and workup was negative.  She was followed up with her cardiologist, , on 6/21/23 and scheduled for outpatient cardiac PET. Patient's most recent stress and echo were performed 04/2023.  Patient states she has been experiencing intermittent chest pain for 1 week and describes it as a heaviness.  Patient states she takes nitroglycerin daily and it will provide relief for a few minutes.  EKG showed normal sinus rhythm with no ST elevation or depression, troponin 0.007, AST 93, ALT 75, eGFR 42 (38.5 last week) and hemoglobin 11.7.  Chest x-ray showed no acute abnormality.  Patient was given nitro paste in ED with some relief.  Patient referred to Hospital Medicine and seen in the ED.  Patient reports chest pain continues to occur intermittently with no precipitating factors.  Patient also reports chest tenderness and states this is not new.  Patient reports vomiting earlier today and denies any further episodes.  Patient denies shortness of breath, nausea, diarrhea, fever and chills.  Patient will be admitted to Hospital Medicine for further evaluation and management.          Procedure(s) (LRB):  Angiogram, Coronary, with Left  Heart Cath (N/A)      Hospital Course:   Patient with a history of CAD status post PCI, history of renal transplant, insulin-dependent diabetes mellitus, hypertension, depression, GERD, presented as transfer from [facility] for [facility].  Patient presented with left-sided chest pain/pressure associated with nausea and emesis, latter symptoms have resolved but chest pressure persisted.  EKG no ST elevation, troponin flat, followed by outpatient cardiologist , and was planned for outpatient cardiac PET.  She also had recent EGD May 2023, widely patent Schatzki ring lower esophagus status post Savary dilatation, biopsies negative for eosinophilic esophagitis, mild gastritis, negative H pylori, recommends b.i.d. PPI.  Previous echo with EF of 60 % with mild to moderate aortic regurgitation.  On 07/10 underwent left heart catheterization by Dr. Hunter, [facility] report as outlined below, alternate etiology for chest pain suspected.  Blood pressure still not controlled up to 180, home antihypertensives resumed and monitoring overnight.  On 07/11 no new complaints, with resumption of home antihypertensives blood pressure better controlled in afternoon.  Feels ready for discharge, cleared by consultant for discharge.  Discharge plan including medication, follow-up as well as return precautions were reviewed, she expressed understanding, no questions or concerns.  Discussed with nursing.      Discharge examination  Lying in bed, alert and oriented, on RA, right groin cdi dressing    [facility]  Summary         The Dist RCA lesion was 50% stenosed.    The Dist LAD lesion was 65% stenosed.    The pre-procedure left ventricular end diastolic pressure was 10.    The estimated blood loss was none.    There was non-obstructive coronary artery disease..PATENT STENT IN DIAGONAL       Goals of Care Treatment Preferences:  Code Status: Full Code      Consults:   Consults (From admission, onward)        Status Ordering  Provider     Inpatient consult to Cardiology  Once        Provider:  (Not yet assigned)    Completed ROMELIA FAROOQ     Inpatient consult to Nephrology  Once        Provider:  Dalton Heaton MD    Completed JAZMÍN KHOURY     Inpatient consult to Cardiology  Once        Provider:  Vira Love MD    Completed MARIANNA ALEXANDRE          No new Assessment & Plan notes have been filed under this hospital service since the last note was generated.  Service: Hospital Medicine    Final Active Diagnoses:    Diagnosis Date Noted POA    PRINCIPAL PROBLEM:  Essential hypertension  [E11.22, I12.9, N18.30] 11/19/2019 Yes     Chronic    Chest pain [R07.9] 03/27/2017 Yes    CAD s/p PCI [I25.10] 07/10/2023 Yes     Chronic    Aortic regurgitation, mild to moderate [I35.1] 07/10/2023 Yes     Chronic    Stage 3a chronic kidney disease [N18.31] 05/26/2022 Yes     Chronic    Hypothyroidism [E03.9] 11/19/2019 Yes     Chronic    Kidney transplant recipient [Z94.0] 01/15/2019 Not Applicable     Chronic    Long-term use of immunosuppressant medication [Z79.60] 01/15/2019 Not Applicable     Chronic    Gastroesophageal reflux disease without esophagitis [K21.9] 07/30/2018 Yes    Anemia of chronic disease [D63.8] 07/12/2017 Yes     Chronic    Mixed hyperlipidemia [E78.2] 07/12/2017 Yes    Bilateral low back pain without sciatica [M54.50] 03/27/2017 Yes     Chronic    MDD (major depressive disorder), recurrent episode, mild [F33.0] 01/21/2015 Yes     Chronic    Controlled type 2 diabetes mellitus with complication, with long-term current use of insulin, HbA1c 6.7 percent [E11.8, Z79.4] 09/27/2013 Not Applicable     Chronic      Problems Resolved During this Admission:       Discharged Condition: good    Disposition: Home or Self Care    Follow Up:   Follow-up Information     Fady Vaughn MD. Schedule an appointment as soon as possible for a visit in 1 week(s).    Specialty: Family Medicine  Why: appt  requested. the clinic should call you to schedule this appt, if they do not call by the end of the week, call them to schedule.  Contact information:  [address]  [address]  [place] [address]  [phone]             Bryant Werner MD. Schedule an appointment as soon as possible for a visit in 2 week(s).    Specialties: Cardiology, Cardiovascular Disease  Why: appt requested. the clinic should call you to schedule this appt, if they do not call by the end of the week, call them to schedule.  Contact information:  [address]  [address]  [place] [address]  [phone]                       Patient Instructions:      Diet diabetic     Notify your health care provider if you experience any of the following:  temperature >100.4     Notify your health care provider if you experience any of the following:  difficulty breathing or increased cough     Notify your health care provider if you experience any of the following:  severe uncontrolled pain     Activity as tolerated       Significant Diagnostic Studies: Labs:   BMP:   Recent Labs   Lab 07/10/23  0454 07/11/23  0441   * 136*    140   K 4.2 4.0    110   CO2 25 26   BUN 21 19   CREATININE 0.9 1.0   CALCIUM 9.0 9.0   MG 1.7 1.7   , CMP   Recent Labs   Lab 07/10/23  0454 07/11/23  0441    140   K 4.2 4.0    110   CO2 25 26   * 136*   BUN 21 19   CREATININE 0.9 1.0   CALCIUM 9.0 9.0   PROT 7.2 7.0   ALBUMIN 3.9 3.8   BILITOT 0.8 0.8   ALKPHOS 87 85   AST 75* 53*   ALT 68* 57*   ANIONGAP 7* 4*   , CBC   Recent Labs   Lab 07/10/23  0454 07/11/23  0441   WBC 6.56 5.50   HGB 11.7* 10.5*   HCT 37.9 33.6*    138*   , INR   Lab Results   Component Value Date    INR 1.1 09/13/2022    INR 1.0 06/09/2022    INR 1.1 04/09/2022   , Lipid Panel   Lab Results   Component Value Date    CHOL 81 (L) 04/26/2023    HDL 27 (L) 04/26/2023    LDLCALC 27.6 (L) 04/26/2023    TRIG 132 04/26/2023    CHOLHDL 33.3 04/26/2023   ,  Troponin   Recent Labs   Lab 07/07/23  2131 07/08/23  0228 07/08/23  0502   TROPONINI 0.007 0.016 0.006    and A1C:   Recent Labs   Lab 04/26/23  1351 06/30/23  1117   HGBA1C 7.0* 6.7*       Pending Diagnostic Studies:     None       X-Ray Chest PA And Lateral    Result Date: 6/19/2023  EXAMINATION: XR CHEST PA AND LATERAL CLINICAL HISTORY: Chest pain. Pt states Hx of heart attack TECHNIQUE: PA and lateral views of the chest were performed. COMPARISON: Frontal chest radiograph, 04/10/2023. FINDINGS: No consolidation, pleural effusion or pneumothorax. Cardiomediastinal silhouette is within normal limits for size. No acute osseous abnormality.  Cervical fusion hardware is noted.     No consolidation or evidence of acute cardiac decompensation. Electronically signed by: Yonny Leal MD Date:    06/19/2023 Time:    16:48    X-Ray Chest AP Portable    Result Date: 7/7/2023  EXAMINATION: XR CHEST AP PORTABLE CLINICAL HISTORY: Chest Pain; TECHNIQUE: Single frontal view of the chest was performed. COMPARISON: 06/19/2023, 04/10/2023, 09/13/2022 FINDINGS: Cardiomediastinal silhouette is stable and not significantly enlarged.  There is no evidence of acute lung parenchymal or pleural abnormality.  Degenerative changes of the spine and shoulders as well as postoperative changes of the cervical spine with hardware again noted.     No acute or significant focal abnormality involving the chest. Electronically signed by: Hien Malik Date:    07/07/2023 Time:    19:50    Echo    Result Date: 7/10/2023  · The left ventricle is normal in size with concentric hypertrophy and normal systolic function. · The estimated ejection fraction is 60%. · Normal left ventricular diastolic function. · Normal right ventricular size with normal right ventricular systolic function. · Mild-to-moderate aortic regurgitation.      Cardiac catheterization    Result Date: 7/11/2023    The Dist RCA lesion was 50% stenosed.   The Dist LAD lesion was  65% stenosed.   The pre-procedure left ventricular end diastolic pressure was 10.   The estimated blood loss was none.   There was non-obstructive coronary artery disease..PATENT STENT IN DIAGONAL The procedure log was documented by Documenter: RT Rosa and verified by Ajay Hunter MD. Date: 7/10/2023  Time: 2:44 PM     Medications:  Reconciled Home Medications:      Medication List      CONTINUE taking these medications    acetaminophen 325 MG tablet  Commonly known as: TYLENOL  Take 2 tablets (650 mg total) by mouth every 8 (eight) hours as needed for Pain.     amLODIPine 5 MG tablet  Commonly known as: NORVASC  Take 1 tablet by mouth once daily.     aspirin 81 MG EC tablet  Commonly known as: ECOTRIN  Take 1 tablet (81 mg total) by mouth once daily.     clopidogreL 75 mg tablet  Commonly known as: PLAVIX  TAKE ONE TABLET BY MOUTH DAILY     doxazosin 2 MG tablet  Commonly known as: CARDURA  Take 2 mg by mouth once daily.     DULoxetine 30 MG capsule  Commonly known as: CYMBALTA  Take 30 mg by mouth once daily.     empagliflozin 10 mg tablet  Commonly known as: JARDIANCE  Take 1 tablet (10 mg total) by mouth once daily.     famotidine 40 MG tablet  Commonly known as: PEPCID  Take 1 tablet (40 mg total) by mouth every evening.     insulin aspart U-100 100 unit/mL (3 mL) Inpn pen  Commonly known as: NovoLOG FlexPen U-100 Insulin  32 u breakfast and supper, 28 u lunch + correction. Max TDD 140u     insulin degludec 200 unit/mL (3 mL) insulin pen  Commonly known as: TRESIBA FLEXTOUCH U-200  Inject 76 Units into the skin once daily.     isosorbide mononitrate 30 MG 24 hr tablet  Commonly known as: IMDUR  TAKE ONE TABLET BY MOUTH DAILY     lansoprazole 30 MG capsule  Commonly known as: PREVACID  Take 1 capsule (30 mg total) by mouth 2 (two) times a day.     levothyroxine 75 MCG tablet  Commonly known as: SYNTHROID  TAKE ONE TABLET BY MOUTH ONCE DAILY     LIDOcaine 5 %  Commonly known as: LIDODERM  1  "patch.     LORazepam 1 MG tablet  Commonly known as: ATIVAN  Take 1 mg by mouth every evening.     losartan 50 MG tablet  Commonly known as: COZAAR  Take 1 tablet (50 mg total) by mouth once daily.     magnesium oxide 500 mg Tab  Take 500 mg by mouth once daily.     metFORMIN 500 MG ER 24hr tablet  Commonly known as: GLUCOPHAGE-XR  Take 1 tablet (500 mg total) by mouth 2 (two) times daily with meals.     metoprolol tartrate 25 MG tablet  Commonly known as: LOPRESSOR  Take 0.5 tablets (12.5 mg total) by mouth 2 (two) times daily.     multivitamin tablet  Commonly known as: THERAGRAN  Take 1 tablet by mouth once daily.     sucralfate 1 gram tablet  Commonly known as: CARAFATE  Take 1 tablet (1 g total) by mouth every 6 (six) hours as needed (GERD).     tacrolimus 1 MG Cap  Commonly known as: PROGRAF  Take 1 capsule (1 mg total) by mouth every 12 (twelve) hours.     tiZANidine 4 MG tablet  Commonly known as: ZANAFLEX  Take 1 tablet by mouth every evening.     vilazodone 40 mg Tab tablet  Commonly known as: VIIBRYD  Take 1 tablet (40 mg total) by mouth once daily.     VRAYLAR 3 mg Cap  Generic drug: cariprazine  Take 3 mg by mouth once daily.        STOP taking these medications    irbesartan 150 MG tablet  Commonly known as: AVAPRO        ASK your doctor about these medications    BD LENIN 2ND GEN PEN NEEDLE 32 gauge x 5/32" Ndle  Generic drug: pen needle, diabetic  Use up to four daily to inject novolog     blood sugar diagnostic Strp  Check glucose 3 (three) times daily.     colchicine 0.6 mg tablet  Commonly known as: COLCRYS  Take 1 tablet (0.6 mg total) by mouth 2 (two) times daily.     DEXCOM G7  Misc  Generic drug: blood-glucose meter,continuous  Dispense 1     DEXCOM G7 SENSOR Amparo  Generic drug: blood-glucose sensor  Change every 10 days     FREESTYLE DAKOTAH 14 DAY SENSOR Kit  Generic drug: flash glucose sensor  Use 1 sensor every 14 days to check blood glucose     lancets Misc  Commonly known as: " ONETOUCH ULTRASOFT LANCETS  Use to test blood sugar 3 x's a day     nitroGLYCERIN 0.4 MG SL tablet  Commonly known as: NITROSTAT  Place 1 tablet (0.4 mg total) under the tongue every 5 (five) minutes as needed for Chest pain.     ondansetron 4 MG tablet  Commonly known as: ZOFRAN  Take 1 tablet (4 mg total) by mouth every 6 (six) hours.            Indwelling Lines/Drains at time of discharge:   Lines/Drains/Airways     Drain  Duration                Hemodialysis AV Fistula Left upper arm -- days                Time spent on the discharge of patient: 31 minutes         Sabrina Momin MD  Department of Hospital Medicine  [facility]

## 2023-07-11 NOTE — CARE UPDATE
07/10/23 2220   Patient Assessment/Suction   Level of Consciousness (AVPU) alert   Respiratory Effort Unlabored   Expansion/Accessory Muscles/Retractions no use of accessory muscles   All Lung Fields Breath Sounds clear;equal bilaterally   Rhythm/Pattern, Respiratory no shortness of breath reported   Cough Frequency no cough   PRE-TX-O2   Device (Oxygen Therapy) room air   SpO2 99 %   Pulse Oximetry Type Continuous   $ Pulse Oximetry - Multiple Charge Pulse Oximetry - Multiple   Pulse 66   Resp 15   Positioning   Head of Bed (HOB) Positioning HOB elevated;HOB at 30-45 degrees   Respiratory Evaluation   $ Care Plan Tech Time 15 min   $ Eval/Re-eval Charges Evaluation

## 2023-07-11 NOTE — PLAN OF CARE
Pt clear for DC from CM standpoint. Discharging home.     07/11/23 1405   Final Note   Assessment Type Final Discharge Note   Anticipated Discharge Disposition Home

## 2023-07-12 ENCOUNTER — TELEPHONE (OUTPATIENT)
Dept: NEPHROLOGY | Facility: CLINIC | Age: 63
End: 2023-07-12

## 2023-07-12 NOTE — TELEPHONE ENCOUNTER
----- Message from Adolfo Steel MD sent at 7/11/2023  2:55 PM CDT -----  Creatinine and kidney function looks great!

## 2023-07-14 ENCOUNTER — OFFICE VISIT (OUTPATIENT)
Dept: PRIMARY CARE CLINIC | Facility: CLINIC | Age: 63
End: 2023-07-14
Payer: MEDICARE

## 2023-07-14 VITALS
DIASTOLIC BLOOD PRESSURE: 80 MMHG | WEIGHT: 197.31 LBS | BODY MASS INDEX: 31.71 KG/M2 | TEMPERATURE: 98 F | RESPIRATION RATE: 18 BRPM | OXYGEN SATURATION: 97 % | HEIGHT: 66 IN | SYSTOLIC BLOOD PRESSURE: 132 MMHG | HEART RATE: 79 BPM

## 2023-07-14 DIAGNOSIS — E11.22 HYPERTENSION ASSOCIATED WITH STAGE 3 CHRONIC KIDNEY DISEASE DUE TO TYPE 2 DIABETES MELLITUS: Chronic | ICD-10-CM

## 2023-07-14 DIAGNOSIS — N18.30 HYPERTENSION ASSOCIATED WITH STAGE 3 CHRONIC KIDNEY DISEASE DUE TO TYPE 2 DIABETES MELLITUS: ICD-10-CM

## 2023-07-14 DIAGNOSIS — E03.9 ACQUIRED HYPOTHYROIDISM: ICD-10-CM

## 2023-07-14 DIAGNOSIS — Z79.4 CONTROLLED TYPE 2 DIABETES MELLITUS WITH COMPLICATION, WITH LONG-TERM CURRENT USE OF INSULIN: Chronic | ICD-10-CM

## 2023-07-14 DIAGNOSIS — E11.8 CONTROLLED TYPE 2 DIABETES MELLITUS WITH COMPLICATION, WITH LONG-TERM CURRENT USE OF INSULIN: Chronic | ICD-10-CM

## 2023-07-14 DIAGNOSIS — K29.70 GASTRITIS, PRESENCE OF BLEEDING UNSPECIFIED, UNSPECIFIED CHRONICITY, UNSPECIFIED GASTRITIS TYPE: Primary | ICD-10-CM

## 2023-07-14 DIAGNOSIS — N18.30 HYPERTENSION ASSOCIATED WITH STAGE 3 CHRONIC KIDNEY DISEASE DUE TO TYPE 2 DIABETES MELLITUS: Chronic | ICD-10-CM

## 2023-07-14 DIAGNOSIS — I12.9 HYPERTENSION ASSOCIATED WITH STAGE 3 CHRONIC KIDNEY DISEASE DUE TO TYPE 2 DIABETES MELLITUS: ICD-10-CM

## 2023-07-14 DIAGNOSIS — I25.10 CORONARY ARTERY DISEASE, UNSPECIFIED VESSEL OR LESION TYPE, UNSPECIFIED WHETHER ANGINA PRESENT, UNSPECIFIED WHETHER NATIVE OR TRANSPLANTED HEART: Chronic | ICD-10-CM

## 2023-07-14 DIAGNOSIS — I12.9 HYPERTENSION ASSOCIATED WITH STAGE 3 CHRONIC KIDNEY DISEASE DUE TO TYPE 2 DIABETES MELLITUS: Chronic | ICD-10-CM

## 2023-07-14 DIAGNOSIS — E11.22 HYPERTENSION ASSOCIATED WITH STAGE 3 CHRONIC KIDNEY DISEASE DUE TO TYPE 2 DIABETES MELLITUS: ICD-10-CM

## 2023-07-14 PROCEDURE — 1159F PR MEDICATION LIST DOCUMENTED IN MEDICAL RECORD: ICD-10-PCS | Mod: CPTII,S$GLB,, | Performed by: FAMILY MEDICINE

## 2023-07-14 PROCEDURE — 3044F PR MOST RECENT HEMOGLOBIN A1C LEVEL <7.0%: ICD-10-PCS | Mod: CPTII,S$GLB,, | Performed by: FAMILY MEDICINE

## 2023-07-14 PROCEDURE — 99214 PR OFFICE/OUTPT VISIT, EST, LEVL IV, 30-39 MIN: ICD-10-PCS | Mod: S$GLB,,, | Performed by: FAMILY MEDICINE

## 2023-07-14 PROCEDURE — 99214 OFFICE O/P EST MOD 30 MIN: CPT | Mod: S$GLB,,, | Performed by: FAMILY MEDICINE

## 2023-07-14 PROCEDURE — 3061F PR NEG MICROALBUMINURIA RESULT DOCUMENTED/REVIEW: ICD-10-PCS | Mod: CPTII,S$GLB,, | Performed by: FAMILY MEDICINE

## 2023-07-14 PROCEDURE — 99999 PR PBB SHADOW E&M-EST. PATIENT-LVL V: ICD-10-PCS | Mod: PBBFAC,,, | Performed by: FAMILY MEDICINE

## 2023-07-14 PROCEDURE — 3008F BODY MASS INDEX DOCD: CPT | Mod: CPTII,S$GLB,, | Performed by: FAMILY MEDICINE

## 2023-07-14 PROCEDURE — 1123F ACP DISCUSS/DSCN MKR DOCD: CPT | Mod: CPTII,S$GLB,, | Performed by: FAMILY MEDICINE

## 2023-07-14 PROCEDURE — 3008F PR BODY MASS INDEX (BMI) DOCUMENTED: ICD-10-PCS | Mod: CPTII,S$GLB,, | Performed by: FAMILY MEDICINE

## 2023-07-14 PROCEDURE — 1101F PR PT FALLS ASSESS DOC 0-1 FALLS W/OUT INJ PAST YR: ICD-10-PCS | Mod: CPTII,S$GLB,, | Performed by: FAMILY MEDICINE

## 2023-07-14 PROCEDURE — 3079F DIAST BP 80-89 MM HG: CPT | Mod: CPTII,S$GLB,, | Performed by: FAMILY MEDICINE

## 2023-07-14 PROCEDURE — 3066F NEPHROPATHY DOC TX: CPT | Mod: CPTII,S$GLB,, | Performed by: FAMILY MEDICINE

## 2023-07-14 PROCEDURE — 4010F PR ACE/ARB THEARPY RXD/TAKEN: ICD-10-PCS | Mod: CPTII,S$GLB,, | Performed by: FAMILY MEDICINE

## 2023-07-14 PROCEDURE — 1159F MED LIST DOCD IN RCRD: CPT | Mod: CPTII,S$GLB,, | Performed by: FAMILY MEDICINE

## 2023-07-14 PROCEDURE — 3061F NEG MICROALBUMINURIA REV: CPT | Mod: CPTII,S$GLB,, | Performed by: FAMILY MEDICINE

## 2023-07-14 PROCEDURE — 3044F HG A1C LEVEL LT 7.0%: CPT | Mod: CPTII,S$GLB,, | Performed by: FAMILY MEDICINE

## 2023-07-14 PROCEDURE — 1123F PR ADV CARE PLAN DISCUSSED, PLAN OR SURROGATE DOCUMENTED: ICD-10-PCS | Mod: CPTII,S$GLB,, | Performed by: FAMILY MEDICINE

## 2023-07-14 PROCEDURE — 3288F PR FALLS RISK ASSESSMENT DOCUMENTED: ICD-10-PCS | Mod: CPTII,S$GLB,, | Performed by: FAMILY MEDICINE

## 2023-07-14 PROCEDURE — 1101F PT FALLS ASSESS-DOCD LE1/YR: CPT | Mod: CPTII,S$GLB,, | Performed by: FAMILY MEDICINE

## 2023-07-14 PROCEDURE — 3075F SYST BP GE 130 - 139MM HG: CPT | Mod: CPTII,S$GLB,, | Performed by: FAMILY MEDICINE

## 2023-07-14 PROCEDURE — 3288F FALL RISK ASSESSMENT DOCD: CPT | Mod: CPTII,S$GLB,, | Performed by: FAMILY MEDICINE

## 2023-07-14 PROCEDURE — 3066F PR DOCUMENTATION OF TREATMENT FOR NEPHROPATHY: ICD-10-PCS | Mod: CPTII,S$GLB,, | Performed by: FAMILY MEDICINE

## 2023-07-14 PROCEDURE — 99999 PR PBB SHADOW E&M-EST. PATIENT-LVL V: CPT | Mod: PBBFAC,,, | Performed by: FAMILY MEDICINE

## 2023-07-14 PROCEDURE — 1160F PR REVIEW ALL MEDS BY PRESCRIBER/CLIN PHARMACIST DOCUMENTED: ICD-10-PCS | Mod: CPTII,S$GLB,, | Performed by: FAMILY MEDICINE

## 2023-07-14 PROCEDURE — 3079F PR MOST RECENT DIASTOLIC BLOOD PRESSURE 80-89 MM HG: ICD-10-PCS | Mod: CPTII,S$GLB,, | Performed by: FAMILY MEDICINE

## 2023-07-14 PROCEDURE — 1160F RVW MEDS BY RX/DR IN RCRD: CPT | Mod: CPTII,S$GLB,, | Performed by: FAMILY MEDICINE

## 2023-07-14 PROCEDURE — 3075F PR MOST RECENT SYSTOLIC BLOOD PRESS GE 130-139MM HG: ICD-10-PCS | Mod: CPTII,S$GLB,, | Performed by: FAMILY MEDICINE

## 2023-07-14 PROCEDURE — 4010F ACE/ARB THERAPY RXD/TAKEN: CPT | Mod: CPTII,S$GLB,, | Performed by: FAMILY MEDICINE

## 2023-07-14 RX ORDER — SUCRALFATE 1 G/1
1 TABLET ORAL
Qty: 120 TABLET | Refills: 3 | Status: SHIPPED | OUTPATIENT
Start: 2023-07-14

## 2023-07-14 NOTE — PROGRESS NOTES
THIS DOCUMENT WAS MADE IN PART WITH VOICE RECOGNITION SOFTWARE.  OCCASIONALLY THIS SOFTWARE WILL MISINTERPRET WORDS OR PHRASES.      [facility]       Patient ID: Andra Newell is a 63 y.o. female.    ASSESSMENT/PLAN by Problem List:    1. Gastritis, presence of bleeding unspecified, unspecified chronicity, unspecified gastritis type  Assessment & Plan:  Some of her chest discomfort may be gastrointestinal.  Recent EGD did show some mild gastritis.  She is currently taking a PPI and Pepcid.  She was taking an excessive amount of Tums but has cut back on this.  She had not been taking sucralfate.  I will place her back on this and see if it helps, she will follow with her gastroenterologist next week.      2. Coronary artery disease, unspecified vessel or lesion type, unspecified whether angina present, unspecified whether native or transplanted heart  Overview:  [facility] 7/10/23:  Summary     The Dist RCA lesion was 50% stenosed.    The Dist LAD lesion was 65% stenosed.    The pre-procedure left ventricular end diastolic pressure was 10.    The estimated blood loss was none.    There was non-obstructive coronary artery disease..PATENT STENT IN DIAGONAL  Date: 7/10/2023  Time: 2:44 PM      Assessment & Plan:  Recently admitted again with persistent chest pain while waiting for outpatient workup.  She underwent cardiac catheterization that does reveal nonobstructive disease with the more significant lesions distal.  It was determined that her symptoms were not ischemic in nature and she was discharged to continue to pursue medical management.      3. Controlled type 2 diabetes mellitus with complication, with long-term current use of insulin, HbA1c 6.7 percent  Assessment & Plan:  Diabetes has improved and most recent A1c was 6.7%.   Although her Jardiance was discontinued in the hospital worried that that might be contributing some of her  symptoms.  She does state she feels a little better since stopping it but still not certain whether this was actually contributing or not.  Will hold for now but I would certainly recommend reconsidering an SGLT2i in the future      4. Essential hypertension   Assessment & Plan:  Improved and has returned to normal.  Was elevated temporarily after some of her medications were held during the hospitalization.      Other orders  -     sucralfate (CARAFATE) 1 gram tablet; Take 1 tablet (1 g total) by mouth 4 (four) times daily before meals and nightly.  Dispense: 120 tablet; Refill: 3         Follow Up:  In a few weeks as scheduled      Advance Care Planning     Date: 07/14/2023    Living Will  During this visit, I engaged the patient  in the voluntary advance care planning process.   She states she has an active living will.  I encouraged her to review this, update if needed and send us a copy or ask questions if needed  Power of   I initiated the process of voluntary advance care planning today and explained the importance of this process to the patient.  I introduced the concept of advance directives to the patient, as well. Then the patient received detailed information about the importance of designating a Health Care Power of  (HCPOA). She was also instructed to communicate with this person about their wishes for future healthcare, should she become sick and lose decision-making capacity. The patient has previously appointed a HCPOA. After our discussion, the patient has decided to complete a HCPOA and has appointed her significant other, health care agent:  Any Alvarez      Will send us copies    Subjective:     Chief Complaint   Patient presents with    Follow-up     I have reviewed the information entered by the ancillary staff regarding the chief complaint as well as the related history.    Hypertension  This is a recurrent problem. The current episode started yesterday. The problem has  been gradually worsening since onset. The problem is resistant. Associated symptoms include anxiety, chest pain, malaise/fatigue, neck pain, orthopnea and shortness of breath. Pertinent negatives include no blurred vision, headaches, palpitations, peripheral edema, PND or sweats. Agents associated with hypertension include thyroid hormones. Risk factors for coronary artery disease include diabetes mellitus and stress. The current treatment provides no improvement. Compliance problems include psychosocial issues.      Patient is a/an 63 y.o.  female     Statin and Jardiance stopped  Maybe mildly better,   Still has heaviness in chest, but sharp pains have stopped    For complete problem list, past medical history, surgical history, social history, etc., see appropriate section in the electronic medical record    Review of Systems   Constitutional:  Positive for malaise/fatigue.   Eyes:  Negative for blurred vision.   Respiratory:  Positive for shortness of breath.    Cardiovascular:  Positive for chest pain and orthopnea. Negative for palpitations and PND.   Musculoskeletal:  Positive for neck pain.   Neurological:  Negative for headaches.     Objective     Physical Exam  HENT:      Head: Normocephalic and atraumatic.      Mouth/Throat:      Mouth: Mucous membranes are moist.   Eyes:      General: No scleral icterus.     Conjunctiva/sclera: Conjunctivae normal.   Cardiovascular:      Rate and Rhythm: Normal rate and regular rhythm.      Heart sounds: Murmur heard.   Systolic murmur is present with a grade of 2/6.     No friction rub.      Comments: There is no peripheral edema.  Pulmonary:      Effort: Pulmonary effort is normal. No respiratory distress.      Breath sounds: Normal breath sounds. No wheezing or rales.   Neurological:      Comments: ambulatory   Psychiatric:         Mood and Affect: Mood normal.     Vitals:    07/14/23 1305   BP: 132/80   BP Location: Right arm   Patient Position: Sitting   BP Method:  "Medium (Manual)   Pulse: 79   Resp: 18   Temp: 98.4 °F (36.9 °C)   TempSrc: Oral   SpO2: 97%   Weight: 89.5 kg (197 lb 5 oz)   Height: 5' 6" (1.676 m)       "

## 2023-07-14 NOTE — ASSESSMENT & PLAN NOTE
Recently admitted again with persistent chest pain while waiting for outpatient workup.  She underwent cardiac catheterization that does reveal nonobstructive disease with the more significant lesions distal.  It was determined that her symptoms were not ischemic in nature and she was discharged to continue to pursue medical management.

## 2023-07-14 NOTE — Clinical Note
Health maintenance indicates need for eye exam.  Patient states she just had an exam with Dr. Soniya Ortega.  I do not see results in our system, if not we may need to contact her office for results.

## 2023-07-14 NOTE — ASSESSMENT & PLAN NOTE
Improved and has returned to normal.  Was elevated temporarily after some of her medications were held during the hospitalization.

## 2023-07-14 NOTE — ASSESSMENT & PLAN NOTE
Some of her chest discomfort may be gastrointestinal.  Recent EGD did show some mild gastritis.  She is currently taking a PPI and Pepcid.  She was taking an excessive amount of Tums but has cut back on this.  She had not been taking sucralfate.  I will place her back on this and see if it helps, she will follow with her gastroenterologist next week.

## 2023-07-14 NOTE — ASSESSMENT & PLAN NOTE
Diabetes has improved and most recent A1c was 6.7%.   Although her Jardiance was discontinued in the hospital worried that that might be contributing some of her symptoms.  She does state she feels a little better since stopping it but still not certain whether this was actually contributing or not.  Will hold for now but I would certainly recommend reconsidering an SGLT2i in the future

## 2023-07-15 RX ORDER — LOSARTAN POTASSIUM 50 MG/1
TABLET ORAL
Qty: 90 TABLET | Refills: 3 | Status: SHIPPED | OUTPATIENT
Start: 2023-07-15

## 2023-07-15 RX ORDER — LEVOTHYROXINE SODIUM 75 UG/1
TABLET ORAL
Qty: 90 TABLET | Refills: 3 | Status: SHIPPED | OUTPATIENT
Start: 2023-07-15

## 2023-07-15 NOTE — TELEPHONE ENCOUNTER
Refill Decision Note   Andra Newell  is requesting a refill authorization.  Brief Assessment and Rationale for Refill:  Approve     Medication Therapy Plan:       Medication Reconciliation Completed: No   Comments:     No Care Gaps recommended.     Note composed:9:41 AM 07/15/2023

## 2023-07-15 NOTE — TELEPHONE ENCOUNTER
No care due was identified.  [facility] Embedded Care Due Messages. Reference number: [id number].   7/15/2023 9:40:57 AM CDT

## 2023-07-17 ENCOUNTER — TELEPHONE (OUTPATIENT)
Dept: PRIMARY CARE CLINIC | Facility: CLINIC | Age: 63
End: 2023-07-17

## 2023-07-17 NOTE — TELEPHONE ENCOUNTER
----- Message from Fady Vaughn MD sent at 7/14/2023  2:18 PM CDT -----  Health maintenance indicates need for eye exam.  Patient states she just had an exam with Dr. Soniya Ortega.  I do not see results in our system, if not we may need to contact her office for results.

## 2023-07-19 ENCOUNTER — TELEPHONE (OUTPATIENT)
Dept: CARDIOLOGY | Facility: CLINIC | Age: 63
End: 2023-07-19

## 2023-07-19 NOTE — TELEPHONE ENCOUNTER
----- Message from Leesa Garcia sent at 7/19/2023 11:31 AM CDT -----  Contact: Pt's wife  Type:  Sooner Appointment Request    Caller is requesting a sooner appointment.  Caller declined first available appointment listed below.  Caller will not accept being placed on the waitlist and is requesting a message be sent to doctor.    Name of Caller:  Pt's wife  When is the first available appointment?  na  Symptoms:  was in hosp last week, still having chest pain and nausea  Best Call Back Number:  [phone], Ms. Any Gustafsonshelleyrandy  Additional Information:  Please call the pt's wife. Thanks!

## 2023-07-19 NOTE — TELEPHONE ENCOUNTER
Informed pt,  has no sooner availability however he does suggest she go to the ER if chest pain and nausea is severe. Pt's wife confirmed.

## 2023-07-20 ENCOUNTER — OFFICE VISIT (OUTPATIENT)
Dept: GASTROENTEROLOGY | Facility: CLINIC | Age: 63
End: 2023-07-20
Payer: MEDICARE

## 2023-07-20 VITALS
DIASTOLIC BLOOD PRESSURE: 79 MMHG | HEIGHT: 66 IN | HEART RATE: 80 BPM | BODY MASS INDEX: 31.89 KG/M2 | WEIGHT: 198.44 LBS | SYSTOLIC BLOOD PRESSURE: 141 MMHG

## 2023-07-20 DIAGNOSIS — K21.9 GASTROESOPHAGEAL REFLUX DISEASE, UNSPECIFIED WHETHER ESOPHAGITIS PRESENT: ICD-10-CM

## 2023-07-20 DIAGNOSIS — R10.10 PAIN OF UPPER ABDOMEN: Primary | ICD-10-CM

## 2023-07-20 DIAGNOSIS — K22.4 ESOPHAGEAL DYSMOTILITY: ICD-10-CM

## 2023-07-20 DIAGNOSIS — R74.8 ELEVATED LIVER ENZYMES: ICD-10-CM

## 2023-07-20 DIAGNOSIS — K44.9 HIATAL HERNIA: ICD-10-CM

## 2023-07-20 DIAGNOSIS — Z90.49 S/P CHOLECYSTECTOMY: ICD-10-CM

## 2023-07-20 DIAGNOSIS — R11.0 NAUSEA: ICD-10-CM

## 2023-07-20 DIAGNOSIS — D64.9 CHRONIC ANEMIA: ICD-10-CM

## 2023-07-20 DIAGNOSIS — N18.9 CHRONIC KIDNEY DISEASE, UNSPECIFIED CKD STAGE: ICD-10-CM

## 2023-07-20 DIAGNOSIS — R12 HEARTBURN: ICD-10-CM

## 2023-07-20 DIAGNOSIS — R13.10 DYSPHAGIA, UNSPECIFIED TYPE: ICD-10-CM

## 2023-07-20 DIAGNOSIS — R07.89 OTHER CHEST PAIN: ICD-10-CM

## 2023-07-20 DIAGNOSIS — K76.0 FATTY LIVER: ICD-10-CM

## 2023-07-20 PROCEDURE — 1157F ADVNC CARE PLAN IN RCRD: CPT | Mod: CPTII,S$GLB,,

## 2023-07-20 PROCEDURE — 3077F SYST BP >= 140 MM HG: CPT | Mod: CPTII,S$GLB,,

## 2023-07-20 PROCEDURE — 99999 PR PBB SHADOW E&M-EST. PATIENT-LVL V: ICD-10-PCS | Mod: PBBFAC,,,

## 2023-07-20 PROCEDURE — 99999 PR PBB SHADOW E&M-EST. PATIENT-LVL V: CPT | Mod: PBBFAC,,,

## 2023-07-20 PROCEDURE — 1160F PR REVIEW ALL MEDS BY PRESCRIBER/CLIN PHARMACIST DOCUMENTED: ICD-10-PCS | Mod: CPTII,S$GLB,,

## 2023-07-20 PROCEDURE — 3288F FALL RISK ASSESSMENT DOCD: CPT | Mod: CPTII,S$GLB,,

## 2023-07-20 PROCEDURE — 3044F PR MOST RECENT HEMOGLOBIN A1C LEVEL <7.0%: ICD-10-PCS | Mod: CPTII,S$GLB,,

## 2023-07-20 PROCEDURE — 4010F ACE/ARB THERAPY RXD/TAKEN: CPT | Mod: CPTII,S$GLB,,

## 2023-07-20 PROCEDURE — 1101F PT FALLS ASSESS-DOCD LE1/YR: CPT | Mod: CPTII,S$GLB,,

## 2023-07-20 PROCEDURE — 1159F PR MEDICATION LIST DOCUMENTED IN MEDICAL RECORD: ICD-10-PCS | Mod: CPTII,S$GLB,,

## 2023-07-20 PROCEDURE — 1157F PR ADVANCE CARE PLAN OR EQUIV PRESENT IN MEDICAL RECORD: ICD-10-PCS | Mod: CPTII,S$GLB,,

## 2023-07-20 PROCEDURE — 3078F PR MOST RECENT DIASTOLIC BLOOD PRESSURE < 80 MM HG: ICD-10-PCS | Mod: CPTII,S$GLB,,

## 2023-07-20 PROCEDURE — 3061F NEG MICROALBUMINURIA REV: CPT | Mod: CPTII,S$GLB,,

## 2023-07-20 PROCEDURE — 3008F BODY MASS INDEX DOCD: CPT | Mod: CPTII,S$GLB,,

## 2023-07-20 PROCEDURE — 3066F PR DOCUMENTATION OF TREATMENT FOR NEPHROPATHY: ICD-10-PCS | Mod: CPTII,S$GLB,,

## 2023-07-20 PROCEDURE — 99214 OFFICE O/P EST MOD 30 MIN: CPT | Mod: S$GLB,,,

## 2023-07-20 PROCEDURE — 3066F NEPHROPATHY DOC TX: CPT | Mod: CPTII,S$GLB,,

## 2023-07-20 PROCEDURE — 99214 PR OFFICE/OUTPT VISIT, EST, LEVL IV, 30-39 MIN: ICD-10-PCS | Mod: S$GLB,,,

## 2023-07-20 PROCEDURE — 1160F RVW MEDS BY RX/DR IN RCRD: CPT | Mod: CPTII,S$GLB,,

## 2023-07-20 PROCEDURE — 3078F DIAST BP <80 MM HG: CPT | Mod: CPTII,S$GLB,,

## 2023-07-20 PROCEDURE — 3061F PR NEG MICROALBUMINURIA RESULT DOCUMENTED/REVIEW: ICD-10-PCS | Mod: CPTII,S$GLB,,

## 2023-07-20 PROCEDURE — 3288F PR FALLS RISK ASSESSMENT DOCUMENTED: ICD-10-PCS | Mod: CPTII,S$GLB,,

## 2023-07-20 PROCEDURE — 3044F HG A1C LEVEL LT 7.0%: CPT | Mod: CPTII,S$GLB,,

## 2023-07-20 PROCEDURE — 3008F PR BODY MASS INDEX (BMI) DOCUMENTED: ICD-10-PCS | Mod: CPTII,S$GLB,,

## 2023-07-20 PROCEDURE — 3077F PR MOST RECENT SYSTOLIC BLOOD PRESSURE >= 140 MM HG: ICD-10-PCS | Mod: CPTII,S$GLB,,

## 2023-07-20 PROCEDURE — 4010F PR ACE/ARB THEARPY RXD/TAKEN: ICD-10-PCS | Mod: CPTII,S$GLB,,

## 2023-07-20 PROCEDURE — 1101F PR PT FALLS ASSESS DOC 0-1 FALLS W/OUT INJ PAST YR: ICD-10-PCS | Mod: CPTII,S$GLB,,

## 2023-07-20 PROCEDURE — 1159F MED LIST DOCD IN RCRD: CPT | Mod: CPTII,S$GLB,,

## 2023-07-20 NOTE — PROGRESS NOTES
Subjective:       Patient ID: Andra Newell is a 63 y.o. female Body mass index is 32.02 kg/m².    Chief Complaint: Abdominal Pain    This patient is new to me.  Established patient of Dr. Bryson.     Gastroesophageal Reflux  She complains of abdominal pain (Reports intermittent upper abdominal pain; denies pain currently), chest pain (sternal pain that radiates to the right of chest; described as a constant pressure with sharp episodes once every 4-5 days that causes her to be unable to move; takes nitroglycerin with short term relief; has been seen by cardiology; recommended follow-up), dysphagia (Reports intermittent dysphagia after swallowing solid foods; feels as though items are getting stuck in her throat requiring her to drink water for improvement; denies use of Heimlich maneuver; hx of esophageal dysmotility), heartburn and nausea (intermittently occurs sometimes after eating; takes Tums or Zofran with improvement). She reports no belching, no choking, no coughing, no early satiety, no globus sensation, no hoarse voice, no sore throat or no water brash. Also tastes unpleasant taste in her mouth in the morning. This is a chronic problem. The current episode started more than 1 year ago. The problem occurs frequently. The problem has been gradually worsening. The heartburn duration is several minutes (short-term relief with TUMs). Heartburn location: sternum. The heartburn is of moderate intensity. The heartburn does not wake her from sleep. The heartburn does not limit her activity. The heartburn doesn't change with position. The symptoms are aggravated by lying down. Associated symptoms include anemia (chronic anemia), fatigue and muscle weakness (left leg; hx of chronic lower back pain/issues). Pertinent negatives include no melena or weight loss. EGD 05/31/23 -  Widely patent Schatzki ring. Dilated. Normal mucosa was found in the entire esophagus. Small hiatal hernia. Gastritis. Biopsied.  Normal  examined duodenum. Biopsies were taken with a cold forceps for evaluation of eosinophilic esophagitis. Patient with complaints of burning/reflux that is not improved with twice daily PPI. Esophagram without reflux noted, ? symptoms due to alternate etiology; patho: benign, no bacteria. Risk factors include caffeine use, obesity and hiatal hernia (Rarely drinks coffee). She has tried head elevation, a PPI, an antacid and a histamine-2 antagonist (Currently taking Prevacid 30 mg b.i.d., Carafate, Pepcid 40 mg daily, and Tums p.r.n.; past treatments: omeprazole) for the symptoms. The treatment provided moderate relief. Past procedures include an abdominal ultrasound (06/09/22 - Hepatomegaly with increased liver echogenicity as can be seen with fatty infiltration. Evidence of cholecystectomy. End-stage native kidneys bilaterally), an EGD and a UGI (Esophagram 05/25/23 - Small sliding-type hiatal hernia. Mild esophageal dysmotility). Past procedures do not include esophageal manometry, esophageal pH monitoring or H. pylori antibody titer. Past invasive treatments do not include gastroplasty, gastroplication or reflux surgery.     Review of Systems   Constitutional:  Positive for fatigue. Negative for activity change, appetite change, chills, diaphoresis, fever, unexpected weight change and weight loss.   HENT:  Positive for trouble swallowing. Negative for hoarse voice and sore throat.    Respiratory:  Negative for cough and choking.    Cardiovascular:  Positive for chest pain (sternal pain that radiates to the right of chest; described as a constant pressure with sharp episodes once every 4-5 days that causes her to be unable to move; takes nitroglycerin with short term relief; has been seen by cardiology; recommended follow-up).   Gastrointestinal:  Positive for abdominal pain (Reports intermittent upper abdominal pain; denies pain currently), dysphagia (Reports intermittent dysphagia after swallowing solid foods;  feels as though items are getting stuck in her throat requiring her to drink water for improvement; denies use of Heimlich maneuver; hx of esophageal dysmotility), heartburn and nausea (intermittently occurs sometimes after eating; takes Tums or Zofran with improvement). Negative for abdominal distention, anal bleeding, blood in stool, constipation, diarrhea, melena, rectal pain and vomiting.   Musculoskeletal:  Positive for muscle weakness (left leg; hx of chronic lower back pain/issues).       Patient's last menstrual period was 02/28/2012.  Past Medical History:   Diagnosis Date    Anemia     Anemia in chronic kidney disease, on chronic dialysis 7/12/2017    Anxiety and depression     Aplastic anemia with parvovirus B19 infection 5/27/2019    Aplastic anemia with parvovirus B19 infection 4/2019 5/27/2019    Arthritis     Asthma     allergic airway    CKD stage III (moderate) 2/18/2019    Colon polyp     Depression     Diabetes mellitus     Diverticulosis     Encounter for blood transfusion     Eosinophilia     ESRD (end stage renal disease)     stage V, due for living donor 9/2018    ESRD on dialysis     GERD (gastroesophageal reflux disease)     Gout     Gout     Hyperlipidemia     Hypertension     Hypertriglyceridemia without hypercholesterolemia 6/9/2019    Low back pain     Low HDL (under 40) 6/9/2019    MRSA carrier     Neutropenia, unspecified 5/8/2019    Obesity     Renal disorder     Rotator cuff syndrome--left     Thyroid disease     Ulnar neuropathy of right upper extremity     Uncontrolled type 2 diabetes mellitus with hyperglycemia      Past Surgical History:   Procedure Laterality Date    ACHILLES TENDON SURGERY Left 05/2015    ANGIOGRAM, CORONARY, WITH LEFT HEART CATHETERIZATION N/A 7/10/2023    Procedure: Angiogram, Coronary, with Left Heart Cath;  Surgeon: Ajay Hunter MD;  Location: [facility] CATH/EP LAB;  Service: Cardiology;  Laterality: N/A;    BACK SURGERY      CERVICAL SPINE SURGERY  2021     "cervical gate placed by Dr. Vera- valeria,asim, and "gate"    CHOLECYSTECTOMY      COLONOSCOPY N/A 09/06/2017    Procedure: COLONOSCOPY;  Surgeon: Marisol Bryson MD;  Location: [facility];  Service: Endoscopy;  Laterality: N/A; REPEAT IN 3 YEARS FOR SURVEILLANCE    COLONOSCOPY N/A 05/09/2019    Procedure: COLONOSCOPY;  Surgeon: Fady Ewing MD;  Location: [facility] ENDO (2ND FLR);  Service: Endoscopy;  Laterality: N/A;    CORONARY ANGIOGRAPHY  08/31/2022    Procedure: ANGIOGRAM, CORONARY ARTERY;  Surgeon: Christoph Dang MD;  Location: [facility];  Service: Cardiology;;    DIALYSIS FISTULA CREATION  12/2017    ESOPHAGOGASTRODUODENOSCOPY N/A 05/09/2019    Procedure: EGD (ESOPHAGOGASTRODUODENOSCOPY);  Surgeon: Fady Ewing MD;  Location: [facility] (2ND FLR);  Service: Endoscopy;  Laterality: N/A;    ESOPHAGOGASTRODUODENOSCOPY N/A 06/10/2021    Procedure: EGD (ESOPHAGOGASTRODUODENOSCOPY);  Surgeon: Marisol Bryson MD;  Location: [facility];  Service: Endoscopy;  Laterality: N/A;    ESOPHAGOGASTRODUODENOSCOPY N/A 5/31/2023    Procedure: EGD (ESOPHAGOGASTRODUODENOSCOPY);  Surgeon: Marisol Bryson MD;  Location: [facility];  Service: Endoscopy;  Laterality: N/A;    FRACTURE SURGERY  1990    Ankle knee    HEMORRHOID SURGERY      INJECTION OF ANESTHETIC AGENT AROUND MEDIAL BRANCH NERVES INNERVATING LUMBAR FACET JOINT Right 09/25/2019    Procedure: Block-nerve-medial branch-lumbar;  Surgeon: Yonny Ferrer MD;  Location: [facility] OR;  Service: Pain Management;  Laterality: Right;  L2, 3, 4,5     INJECTION OF ANESTHETIC AGENT AROUND MEDIAL BRANCH NERVES INNERVATING LUMBAR FACET JOINT Right 10/16/2019    Procedure: Block-nerve-medial branch-lumbar;  Surgeon: Yonny Ferrer MD;  Location: [facility] OR;  Service: Pain Management;  Laterality: Right;  L2, 3, 4,5     JOINT REPLACEMENT      left    KIDNEY TRANSPLANT N/A 01/14/2019    Procedure: TRANSPLANT, KIDNEY;  Surgeon: Roland Salazar MD;  Location: [facility] 2ND FLR;  Service: " Transplant;  Laterality: N/A;    KNEE SURGERY      RADIOFREQUENCY ABLATION OF LUMBAR MEDIAL BRANCH NERVE AT SINGLE LEVEL Right 10/29/2019    Procedure: Radiofrequency Ablation, Nerve, Spinal, Lumbar, Medial Branch, 1 Level;  Surgeon: Yonny Ferrer MD;  Location: [facility] OR;  Service: Pain Management;  Laterality: Right;  L2, 3, 4, 5  burned at 80 degrees C X 60 seconds each site X 2    SHOULDER ARTHROSCOPY      SHOULDER SURGERY      SPINE SURGERY  2020    Pain pump in back    UPPER GASTROINTESTINAL ENDOSCOPY  ~2013     [facility]     Family History   Problem Relation Age of Onset    Diabetes Mother     Alzheimer's disease Mother     Thyroid disease Mother     Hyperlipidemia Mother     Arthritis Mother     Depression Mother     Heart disease Mother     Heart disease Father     Stroke Father     Diabetes Sister     Lupus Sister     Ovarian cancer Sister     Mental illness Sister     Heart disease Brother     COPD Maternal Aunt     Diabetes Maternal Aunt     Heart disease Maternal Aunt     Hypertension Maternal Aunt     No Known Problems Maternal Uncle     No Known Problems Paternal Aunt     No Known Problems Paternal Uncle     Diabetes Maternal Grandmother     Hypertension Maternal Grandmother     Kidney disease Maternal Grandmother     No Known Problems Paternal Grandmother     No Known Problems Paternal Grandfather     No Known Problems Son     Colon cancer Neg Hx     Colon polyps Neg Hx     Crohn's disease Neg Hx     Ulcerative colitis Neg Hx     Celiac disease Neg Hx     Esophageal cancer Neg Hx     Stomach cancer Neg Hx      Social History     Tobacco Use    Smoking status: Never    Smokeless tobacco: Never   Substance Use Topics    Alcohol use: No     Alcohol/week: 0.0 standard drinks    Drug use: No     Wt Readings from Last 10 Encounters:   07/20/23 90 kg (198 lb 6.6 oz)   07/14/23 89.5 kg (197 lb 5 oz)   07/09/23 91 kg (200 lb 9.9 oz)   07/10/23 90.7 kg (200 lb)   06/21/23 92.6 kg (204 lb 2.3 oz)   05/31/23 93.9  kg (207 lb)   05/10/23 92.5 kg (203 lb 14.8 oz)   05/03/23 92.6 kg (204 lb 2.3 oz)   04/17/23 93.2 kg (205 lb 5.7 oz)   04/12/23 92.4 kg (203 lb 13 oz)     Lab Results   Component Value Date    WBC 5.50 07/11/2023    HGB 10.5 (L) 07/11/2023    HCT 33.6 (L) 07/11/2023    MCV 88 07/11/2023     (L) 07/11/2023     CMP  Sodium   Date Value Ref Range Status   07/11/2023 140 136 - 145 mmol/L Final     Potassium   Date Value Ref Range Status   07/11/2023 4.0 3.5 - 5.1 mmol/L Final     Chloride   Date Value Ref Range Status   07/11/2023 110 95 - 110 mmol/L Final     CO2   Date Value Ref Range Status   07/11/2023 26 23 - 29 mmol/L Final     Glucose   Date Value Ref Range Status   07/11/2023 136 (H) 70 - 110 mg/dL Final     BUN   Date Value Ref Range Status   07/11/2023 19 8 - 23 mg/dL Final     Creatinine   Date Value Ref Range Status   07/11/2023 1.0 0.5 - 1.4 mg/dL Final     Calcium   Date Value Ref Range Status   07/11/2023 9.0 8.7 - 10.5 mg/dL Final     Total Protein   Date Value Ref Range Status   07/11/2023 7.0 6.0 - 8.4 g/dL Final     Albumin   Date Value Ref Range Status   07/11/2023 3.8 3.5 - 5.2 g/dL Final     Total Bilirubin   Date Value Ref Range Status   07/11/2023 0.8 0.1 - 1.0 mg/dL Final     Comment:     For infants and newborns, interpretation of results should be based  on gestational age, weight and in agreement with clinical  observations.    Premature Infant recommended reference ranges:  Up to 24 hours.............<8.0 mg/dL  Up to 48 hours............<12.0 mg/dL  3-5 days..................<15.0 mg/dL  6-29 days.................<15.0 mg/dL       Alkaline Phosphatase   Date Value Ref Range Status   07/11/2023 85 55 - 135 U/L Final     AST   Date Value Ref Range Status   07/11/2023 53 (H) 10 - 40 U/L Final     ALT   Date Value Ref Range Status   07/11/2023 57 (H) 10 - 44 U/L Final     Anion Gap   Date Value Ref Range Status   07/11/2023 4 (L) 8 - 16 mmol/L Final     eGFR if    Date  Value Ref Range Status   06/09/2022 >60.0 >60 mL/min/1.73 m^2 Final     eGFR if non    Date Value Ref Range Status   06/09/2022 59.7 (A) >60 mL/min/1.73 m^2 Final     Comment:     Calculation used to obtain the estimated glomerular filtration  rate (eGFR) is the CKD-EPI equation.        Lab Results   Component Value Date    LIPASE 60 07/08/2023     Lab Results   Component Value Date    LIPASERES 195 06/19/2023     Lab Results   Component Value Date    TSH 0.859 07/08/2023       Reviewed prior medical records including radiology report of chest x-ray 07/07/2023, esophagram 05/25/2023, abdominal ultrasound 06/09/2022 & endoscopy history (see surgical history).    Objective:      Physical Exam  Vitals and nursing note reviewed.   Constitutional:       General: She is not in acute distress.     Appearance: Normal appearance. She is obese. She is not ill-appearing.   HENT:      Mouth/Throat:      Lips: Pink. No lesions.   Cardiovascular:      Rate and Rhythm: Normal rate.      Pulses: Normal pulses.      Heart sounds: Murmur heard.   Pulmonary:      Effort: Pulmonary effort is normal. No respiratory distress.      Breath sounds: Normal breath sounds.   Abdominal:      General: Abdomen is protuberant. Bowel sounds are normal. There is no distension or abdominal bruit. There are no signs of injury.      Palpations: Abdomen is soft. There is no shifting dullness, fluid wave, hepatomegaly, splenomegaly or mass.      Tenderness: There is no abdominal tenderness. There is no guarding or rebound. Negative signs include Chase's sign, Rovsing's sign and McBurney's sign.   Skin:     General: Skin is warm and dry.      Coloration: Skin is not jaundiced or pale.   Neurological:      Mental Status: She is alert and oriented to person, place, and time.   Psychiatric:         Attention and Perception: Attention normal.         Mood and Affect: Mood normal.         Speech: Speech normal.         Behavior: Behavior  normal.       Assessment:       1. Pain of upper abdomen    2. Other chest pain    3. Dysphagia, unspecified type    4. Esophageal dysmotility    5. Gastroesophageal reflux disease, unspecified whether esophagitis present    6. Heartburn    7. Hiatal hernia    8. Nausea    9. Fatty liver    10. Elevated liver enzymes    11. Chronic kidney disease, unspecified CKD stage    12. Chronic anemia    13. S/P cholecystectomy        Plan:       Pain of upper abdomen  -continue with ordered GES  -     CT Abdomen Pelvis Without Contrast; Future; Expected date: 07/20/2023  -     Hepatic Function Panel; Future; Expected date: 07/20/2023  -     Hepatitis Panel, Acute; Future; Expected date: 07/20/2023  -continue Carafate as prescribed    Other chest pain  -follow-up with Cardiology soon  -go to the ER if pain worsens  -continue Carafate as prescribed    Dysphagia, unspecified type & Esophageal dysmotility  -     Fl Modified Barium Swallow Speech; Future; Expected date: 07/20/2023  -     SLP video swallow; Future; Expected date: 07/20/2023  -  eat smaller more frequent meals and to eat slowly and advised to eat a soft diet.  - possible esophageal manometry if symptoms persist    Gastroesophageal reflux disease, unspecified whether esophagitis present & Heartburn  -Avoid large meals, avoid eating within 2-3 hours of bedtime (avoid late night eating & lying down soon after eating), elevate head of bed if nocturnal symptoms are present, smoking cessation (if current smoker), & weight loss (if overweight).   -Avoid known foods which trigger reflux symptoms & to minimize/avoid high-fat foods, chocolate, caffeine, citrus, alcohol, & tomato products.  -Avoid/limit use of NSAID's, since they can cause GI upset, bleeding, and/or ulcers. If needed, take with food.  - Continue Prevacid and Pepcid as prescribed  -     Fl Modified Barium Swallow Speech; Future; Expected date: 07/20/2023  -     SLP video swallow; Future; Expected date:  07/20/2023    Hiatal hernia  -usually managed by controlling reflux symptoms, surgery is an option, but usually performed if reflux is uncontrolled by medication management and lifestyle/dietary modifications; if symptoms persist despite medication management and lifestyle/dietary modifications, we can refer to general surgery to consult about surgical options  -     Fl Modified Barium Swallow Speech; Future; Expected date: 07/20/2023  -     SLP video swallow; Future; Expected date: 07/20/2023    Nausea  -continue GES  -continued Tums p.r.n. and Zofran as prescribed    Fatty liver & Elevated liver enzymes  For fatty liver recommend: low fat, low cholesterol diet, maintain good control of blood sugars and cholesterol levels, exercise, weight loss (if overweight), minimize/avoid alcohol and tylenol products, & follow-up with PCP for continued evaluation and management; if specialist is needed, recommend seeing hepatology.  -     Hepatic Function Panel; Future; Expected date: 07/20/2023  -     Hepatitis Panel, Acute; Future; Expected date: 07/20/2023  -     CT Abdomen Pelvis Without Contrast; Future; Expected date: 07/20/2023    Chronic kidney disease, unspecified CKD stage  -follow-up with nephrology  for continued evaluation and management    Chronic anemia  -follow-up with PCP and/or hematology for continued evaluation and management    S/P cholecystectomy    Follow up in about 4 weeks (around 8/17/2023), or if symptoms worsen or fail to improve.      If no improvement in symptoms or symptoms worsen, call/follow-up at clinic or go to ER.        45 minutes of total time spent on the encounter, which includes face to face time and non-face to face time preparing to see the patient (eg, review of tests), Obtaining and/or reviewing separately obtained history, Documenting clinical information in the electronic or other health record, Independently interpreting results (not separately reported) and communicating results  to the patient/family/caregiver, or Care coordination (not separately reported).     A dictation software program was used for this note. Please expect some simple typographical  errors in this note.

## 2023-07-21 ENCOUNTER — PATIENT MESSAGE (OUTPATIENT)
Dept: GASTROENTEROLOGY | Facility: CLINIC | Age: 63
End: 2023-07-21

## 2023-07-21 DIAGNOSIS — R10.10 PAIN OF UPPER ABDOMEN: Primary | ICD-10-CM

## 2023-07-28 ENCOUNTER — TELEPHONE (OUTPATIENT)
Dept: PRIMARY CARE CLINIC | Facility: CLINIC | Age: 63
End: 2023-07-28

## 2023-07-28 RX ORDER — ONDANSETRON 4 MG/1
TABLET, FILM COATED ORAL
Qty: 12 TABLET | Refills: 0 | Status: SHIPPED | OUTPATIENT
Start: 2023-07-28 | End: 2023-09-21

## 2023-07-28 NOTE — TELEPHONE ENCOUNTER
LOV: 7/14/23  NOV: 8/3/23    Pt still c/o of nausea and vomiting.  Pt was encouraged to hydrate and call us if her symptoms worsen.

## 2023-08-03 ENCOUNTER — PATIENT MESSAGE (OUTPATIENT)
Dept: ENDOCRINOLOGY | Facility: CLINIC | Age: 63
End: 2023-08-03
Payer: MEDICARE

## 2023-08-03 ENCOUNTER — OFFICE VISIT (OUTPATIENT)
Dept: PRIMARY CARE CLINIC | Facility: CLINIC | Age: 63
End: 2023-08-03
Payer: MEDICARE

## 2023-08-03 VITALS
HEART RATE: 82 BPM | OXYGEN SATURATION: 97 % | RESPIRATION RATE: 18 BRPM | TEMPERATURE: 99 F | HEIGHT: 66 IN | WEIGHT: 196.13 LBS | SYSTOLIC BLOOD PRESSURE: 118 MMHG | BODY MASS INDEX: 31.52 KG/M2 | DIASTOLIC BLOOD PRESSURE: 62 MMHG

## 2023-08-03 DIAGNOSIS — R63.4 WEIGHT LOSS, UNINTENTIONAL: ICD-10-CM

## 2023-08-03 DIAGNOSIS — K29.70 GASTRITIS, PRESENCE OF BLEEDING UNSPECIFIED, UNSPECIFIED CHRONICITY, UNSPECIFIED GASTRITIS TYPE: ICD-10-CM

## 2023-08-03 DIAGNOSIS — N18.30 HYPERTENSION ASSOCIATED WITH STAGE 3 CHRONIC KIDNEY DISEASE DUE TO TYPE 2 DIABETES MELLITUS: Chronic | ICD-10-CM

## 2023-08-03 DIAGNOSIS — I12.9 HYPERTENSION ASSOCIATED WITH STAGE 3 CHRONIC KIDNEY DISEASE DUE TO TYPE 2 DIABETES MELLITUS: Chronic | ICD-10-CM

## 2023-08-03 DIAGNOSIS — E11.8 CONTROLLED TYPE 2 DIABETES MELLITUS WITH COMPLICATION, WITH LONG-TERM CURRENT USE OF INSULIN: Primary | Chronic | ICD-10-CM

## 2023-08-03 DIAGNOSIS — K21.9 GASTROESOPHAGEAL REFLUX DISEASE WITHOUT ESOPHAGITIS: ICD-10-CM

## 2023-08-03 DIAGNOSIS — Z79.4 CONTROLLED TYPE 2 DIABETES MELLITUS WITH COMPLICATION, WITH LONG-TERM CURRENT USE OF INSULIN: Primary | Chronic | ICD-10-CM

## 2023-08-03 DIAGNOSIS — E11.22 HYPERTENSION ASSOCIATED WITH STAGE 3 CHRONIC KIDNEY DISEASE DUE TO TYPE 2 DIABETES MELLITUS: Chronic | ICD-10-CM

## 2023-08-03 PROCEDURE — 1160F RVW MEDS BY RX/DR IN RCRD: CPT | Mod: CPTII,S$GLB,, | Performed by: FAMILY MEDICINE

## 2023-08-03 PROCEDURE — 3066F PR DOCUMENTATION OF TREATMENT FOR NEPHROPATHY: ICD-10-PCS | Mod: CPTII,S$GLB,, | Performed by: FAMILY MEDICINE

## 2023-08-03 PROCEDURE — 1159F MED LIST DOCD IN RCRD: CPT | Mod: CPTII,S$GLB,, | Performed by: FAMILY MEDICINE

## 2023-08-03 PROCEDURE — 4010F PR ACE/ARB THEARPY RXD/TAKEN: ICD-10-PCS | Mod: CPTII,S$GLB,, | Performed by: FAMILY MEDICINE

## 2023-08-03 PROCEDURE — 1160F PR REVIEW ALL MEDS BY PRESCRIBER/CLIN PHARMACIST DOCUMENTED: ICD-10-PCS | Mod: CPTII,S$GLB,, | Performed by: FAMILY MEDICINE

## 2023-08-03 PROCEDURE — 3288F FALL RISK ASSESSMENT DOCD: CPT | Mod: CPTII,S$GLB,, | Performed by: FAMILY MEDICINE

## 2023-08-03 PROCEDURE — 99999 PR PBB SHADOW E&M-EST. PATIENT-LVL V: ICD-10-PCS | Mod: PBBFAC,,, | Performed by: FAMILY MEDICINE

## 2023-08-03 PROCEDURE — 1157F PR ADVANCE CARE PLAN OR EQUIV PRESENT IN MEDICAL RECORD: ICD-10-PCS | Mod: CPTII,S$GLB,, | Performed by: FAMILY MEDICINE

## 2023-08-03 PROCEDURE — 3061F NEG MICROALBUMINURIA REV: CPT | Mod: CPTII,S$GLB,, | Performed by: FAMILY MEDICINE

## 2023-08-03 PROCEDURE — 3044F HG A1C LEVEL LT 7.0%: CPT | Mod: CPTII,S$GLB,, | Performed by: FAMILY MEDICINE

## 2023-08-03 PROCEDURE — 3074F SYST BP LT 130 MM HG: CPT | Mod: CPTII,S$GLB,, | Performed by: FAMILY MEDICINE

## 2023-08-03 PROCEDURE — 3288F PR FALLS RISK ASSESSMENT DOCUMENTED: ICD-10-PCS | Mod: CPTII,S$GLB,, | Performed by: FAMILY MEDICINE

## 2023-08-03 PROCEDURE — 99214 OFFICE O/P EST MOD 30 MIN: CPT | Mod: S$GLB,,, | Performed by: FAMILY MEDICINE

## 2023-08-03 PROCEDURE — 3008F PR BODY MASS INDEX (BMI) DOCUMENTED: ICD-10-PCS | Mod: CPTII,S$GLB,, | Performed by: FAMILY MEDICINE

## 2023-08-03 PROCEDURE — 3066F NEPHROPATHY DOC TX: CPT | Mod: CPTII,S$GLB,, | Performed by: FAMILY MEDICINE

## 2023-08-03 PROCEDURE — 1101F PR PT FALLS ASSESS DOC 0-1 FALLS W/OUT INJ PAST YR: ICD-10-PCS | Mod: CPTII,S$GLB,, | Performed by: FAMILY MEDICINE

## 2023-08-03 PROCEDURE — 3078F DIAST BP <80 MM HG: CPT | Mod: CPTII,S$GLB,, | Performed by: FAMILY MEDICINE

## 2023-08-03 PROCEDURE — 99999 PR PBB SHADOW E&M-EST. PATIENT-LVL V: CPT | Mod: PBBFAC,,, | Performed by: FAMILY MEDICINE

## 2023-08-03 PROCEDURE — 3061F PR NEG MICROALBUMINURIA RESULT DOCUMENTED/REVIEW: ICD-10-PCS | Mod: CPTII,S$GLB,, | Performed by: FAMILY MEDICINE

## 2023-08-03 PROCEDURE — 4010F ACE/ARB THERAPY RXD/TAKEN: CPT | Mod: CPTII,S$GLB,, | Performed by: FAMILY MEDICINE

## 2023-08-03 PROCEDURE — 99214 PR OFFICE/OUTPT VISIT, EST, LEVL IV, 30-39 MIN: ICD-10-PCS | Mod: S$GLB,,, | Performed by: FAMILY MEDICINE

## 2023-08-03 PROCEDURE — 3074F PR MOST RECENT SYSTOLIC BLOOD PRESSURE < 130 MM HG: ICD-10-PCS | Mod: CPTII,S$GLB,, | Performed by: FAMILY MEDICINE

## 2023-08-03 PROCEDURE — 3044F PR MOST RECENT HEMOGLOBIN A1C LEVEL <7.0%: ICD-10-PCS | Mod: CPTII,S$GLB,, | Performed by: FAMILY MEDICINE

## 2023-08-03 PROCEDURE — 3078F PR MOST RECENT DIASTOLIC BLOOD PRESSURE < 80 MM HG: ICD-10-PCS | Mod: CPTII,S$GLB,, | Performed by: FAMILY MEDICINE

## 2023-08-03 PROCEDURE — 1101F PT FALLS ASSESS-DOCD LE1/YR: CPT | Mod: CPTII,S$GLB,, | Performed by: FAMILY MEDICINE

## 2023-08-03 PROCEDURE — 1159F PR MEDICATION LIST DOCUMENTED IN MEDICAL RECORD: ICD-10-PCS | Mod: CPTII,S$GLB,, | Performed by: FAMILY MEDICINE

## 2023-08-03 PROCEDURE — 1157F ADVNC CARE PLAN IN RCRD: CPT | Mod: CPTII,S$GLB,, | Performed by: FAMILY MEDICINE

## 2023-08-03 PROCEDURE — 3008F BODY MASS INDEX DOCD: CPT | Mod: CPTII,S$GLB,, | Performed by: FAMILY MEDICINE

## 2023-08-03 NOTE — ASSESSMENT & PLAN NOTE
She is had episodes of hypoglycemia since she is not eating very much, losing weight.  As a result she has stopped her insulin.  She is reaching out to her endocrinologist for further recommendations.  At this point I would recommend sticking to a mild sliding scale of her rapid acting only but at a reduced dosage then normal until she receives further instruction from Endocrinology.

## 2023-08-03 NOTE — PROGRESS NOTES
THIS DOCUMENT WAS MADE IN PART WITH VOICE RECOGNITION SOFTWARE.  OCCASIONALLY THIS SOFTWARE WILL MISINTERPRET WORDS OR PHRASES.      Primary Care Provider Appointment   [facility]       Patient ID: Andra Newell is a 63 y.o. female.    ASSESSMENT/PLAN by Problem List:    1. Controlled type 2 diabetes mellitus with complication, with long-term current use of insulin, HbA1c 6.7 percent  Assessment & Plan:  She is had episodes of hypoglycemia since she is not eating very much, losing weight.  As a result she has stopped her insulin.  She is reaching out to her endocrinologist for further recommendations.  At this point I would recommend sticking to a mild sliding scale of her rapid acting only but at a reduced dosage then normal until she receives further instruction from Endocrinology.      2. Essential hypertension   Assessment & Plan:  Stable and satisfactory.      3. Gastroesophageal reflux disease without esophagitis  4. Gastritis, presence of bleeding unspecified, unspecified chronicity, unspecified gastritis type  5. Weight loss, unintentional  She did consult with Gastroenterology for these concerns.  Additional testing including CT scan and barium swallow have been ordered.  I suspect that there is a component of gastroparesis related to her diabetes.  She does report some improvement on the sucralfate.  But will wait and see the results of GI follow-up and testing.       Follow Up:  Six weeks        Subjective:     Chief Complaint   Patient presents with    Follow-up     I have reviewed the information entered by the ancillary staff regarding the chief complaint as well as the related history.    HPI    Patient is a/an 63 y.o.  female     Follow-up gastrointestinal concerns and other.  She does report mild improvement but still having difficult time eating, intake is down, she does have some heartburn and reflux but has reduce the Tums.  Mild improvement with the  "sucralfate.  She did consult with Gastroenterology and has additional testing scheduled.  See above for details    For complete problem list, past medical history, surgical history, social history, etc., see appropriate section in the electronic medical record    Review of Systems   Constitutional:  Negative for activity change and unexpected weight change.   HENT:  Positive for trouble swallowing. Negative for hearing loss and rhinorrhea.    Eyes:  Negative for discharge and visual disturbance.   Respiratory:  Positive for chest tightness. Negative for wheezing.    Cardiovascular:  Negative for chest pain and palpitations.   Gastrointestinal:  Positive for vomiting. Negative for blood in stool, constipation and diarrhea.   Endocrine: Negative for polydipsia and polyuria.   Genitourinary:  Negative for difficulty urinating, dysuria, hematuria and menstrual problem.   Musculoskeletal:  Positive for arthralgias. Negative for joint swelling and neck pain.   Neurological:  Positive for weakness. Negative for headaches.   Psychiatric/Behavioral:  Negative for confusion and dysphoric mood.        Objective     Physical Exam  HENT:      Head: Normocephalic and atraumatic.   Eyes:      General: No scleral icterus.     Conjunctiva/sclera: Conjunctivae normal.   Cardiovascular:      Rate and Rhythm: Normal rate and regular rhythm.      Heart sounds: Murmur heard.      Systolic murmur is present with a grade of 2/6.      No friction rub.      Comments: There is no peripheral edema.  Pulmonary:      Effort: Pulmonary effort is normal. No respiratory distress.      Breath sounds: Normal breath sounds. No wheezing or rales.   Psychiatric:         Mood and Affect: Mood normal.       Vitals:    08/03/23 1442   BP: 118/62   BP Location: Right arm   Patient Position: Sitting   BP Method: Large (Manual)   Pulse: 82   Resp: 18   Temp: 98.6 °F (37 °C)   TempSrc: Oral   SpO2: 97%   Weight: 89 kg (196 lb 1.6 oz)   Height: 5' 6" (1.676 m) " Prophylactic measure

## 2023-08-05 PROBLEM — R07.9 CHEST PAIN: Status: RESOLVED | Noted: 2017-03-27 | Resolved: 2023-08-05

## 2023-08-05 PROBLEM — I10 ESSENTIAL HYPERTENSION: Status: RESOLVED | Noted: 2017-03-27 | Resolved: 2023-08-05

## 2023-08-05 NOTE — PROGRESS NOTES
"Subjective:    Patient ID:  Andra Newell is a 63 y.o. female who presents for follow-up of Hypertension and Hyperlipidemia      Problem List Items Addressed This Visit          Cardiac/Vascular    Aortic regurgitation, mild to moderate (Chronic)    CAD s/p PCI (Chronic)    Overview     [facility] 7/10/23:  Summary     The Dist RCA lesion was 50% stenosed.    The Dist LAD lesion was 65% stenosed.    The pre-procedure left ventricular end diastolic pressure was 10.    The estimated blood loss was none.    There was non-obstructive coronary artery disease..PATENT STENT IN DIAGONAL  Date: 7/10/2023  Time: 2:44 PM           Essential hypertension  (Chronic)    Aortic atherosclerosis - Primary    Coronary artery disease involving native coronary artery of native heart without angina pectoris    Mixed hyperlipidemia    Pericarditis    Renovascular hypertension       HPI    Patient was last seen on 06/21/2023 at which time cardiac PET stress was ordered.  Since that time, angiogram was ordered which showed no obstructive coronary artery disease.  Patent stent.    On assessment today, the patient states that she feels OK. Still having some issues at night, getting GI workup.    No shortness of breath.  No palpitations.    Down 50lbs!         Objective:     Vitals:    08/07/23 1403   BP: 126/70   BP Location: Left arm   Patient Position: Sitting   BP Method: Large (Automatic)   Pulse: 78   Weight: 89.4 kg (197 lb 1.5 oz)   Height: 5' 6" (1.676 m)       BP Readings from Last 5 Encounters:   08/07/23 126/70   08/03/23 118/62   07/20/23 (!) 141/79   07/14/23 132/80   07/11/23 (!) 117/58        Physical Exam  Vitals and nursing note reviewed.   Constitutional:       General: She is not in acute distress.     Appearance: She is well-developed.   HENT:      Head: Normocephalic and atraumatic.   Neck:      Vascular: No JVD.   Cardiovascular:      Rate and Rhythm: Normal rate and regular rhythm.      Heart sounds: Normal heart " "sounds. No murmur heard.     No friction rub. No gallop.   Pulmonary:      Effort: Pulmonary effort is normal. No respiratory distress.      Breath sounds: Normal breath sounds. No wheezing or rales.   Abdominal:      General: Bowel sounds are normal.      Palpations: Abdomen is soft.      Tenderness: There is no abdominal tenderness. There is no guarding or rebound.   Musculoskeletal:         General: No tenderness.      Cervical back: Neck supple.   Skin:     General: Skin is warm and dry.   Neurological:      Mental Status: She is alert and oriented to person, place, and time.   Psychiatric:         Behavior: Behavior normal.             Current Outpatient Medications   Medication Instructions    acetaminophen (TYLENOL) 650 mg, Oral, Every 8 hours PRN    amLODIPine (NORVASC) 5 MG tablet 1 tablet, Oral, Daily    aspirin (ECOTRIN) 81 mg, Oral, Daily    BD LENIN 2ND GEN PEN NEEDLE 32 gauge x 5/32" Ndle Use up to four daily to inject novolog    blood sugar diagnostic Strp Check glucose 3 (three) times daily.    blood-glucose sensor (DEXCOM G7 SENSOR) Amparo Change every 10 days    clopidogreL (PLAVIX) 75 mg tablet TAKE ONE TABLET BY MOUTH DAILY    colchicine (COLCRYS) 0.6 mg, Oral, 2 times daily    DEXCOM G7  Misc Dispense 1    doxazosin (CARDURA) 2 mg, Oral, Daily    DULoxetine (CYMBALTA) 30 mg, Oral, Daily    empagliflozin (JARDIANCE) 10 mg, Oral, Daily    famotidine (PEPCID) 40 mg, Oral, Nightly    flash glucose sensor (FREESTYLE DAKOTAH 14 DAY SENSOR) Kit Use 1 sensor every 14 days to check blood glucose    insulin aspart U-100 (NOVOLOG FLEXPEN U-100 INSULIN) 100 unit/mL (3 mL) InPn pen 32 u breakfast and supper, 28 u lunch + correction. Max TDD 140u    insulin degludec (TRESIBA FLEXTOUCH U-200) 76 Units, Subcutaneous, Daily    isosorbide mononitrate (IMDUR) 30 MG 24 hr tablet TAKE ONE TABLET BY MOUTH DAILY    lancets (ONETOUCH ULTRASOFT LANCETS) Misc Use to test blood sugar 3 x's a day    lansoprazole " (PREVACID) 30 mg, Oral, 2 times daily    levothyroxine (SYNTHROID) 75 MCG tablet Take one tablet by mouth daily    LIDOcaine (LIDODERM) 5 % 1 patch    LORazepam (ATIVAN) 1 mg, Oral, Nightly    losartan (COZAAR) 50 MG tablet Take one tablet by mouth daily    magnesium oxide 500 mg, Oral, Daily    metFORMIN (GLUCOPHAGE-XR) 500 mg, Oral, 2 times daily with meals    metoprolol tartrate (LOPRESSOR) 12.5 mg, Oral, 2 times daily    multivitamin (THERAGRAN) tablet 1 tablet, Oral, Daily    nitroGLYCERIN (NITROSTAT) 0.4 mg, Sublingual, Every 5 min PRN    ondansetron (ZOFRAN) 4 MG tablet TAKE ONE TABLET BY MOUTH EVERY SIX HOURS    sucralfate (CARAFATE) 1 g, Oral, Before meals & nightly    tacrolimus (PROGRAF) 1 mg, Oral, Every 12 hours    tiZANidine (ZANAFLEX) 4 MG tablet 1 tablet, Oral, Nightly    vilazodone (VIIBRYD) 40 mg, Oral, Daily    VRAYLAR 3 mg, Oral, Daily       Lipid Panel:   Lab Results   Component Value Date    CHOL 81 (L) 04/26/2023    HDL 27 (L) 04/26/2023    LDLCALC 27.6 (L) 04/26/2023    TRIG 132 04/26/2023    CHOLHDL 33.3 04/26/2023       The ASCVD Risk score (Laura WEISS, et al., 2019) failed to calculate for the following reasons:    The valid total cholesterol range is 130 to 320 mg/dL    All pertinent labs, imaging, and EKGs reviewed.  Patient's most recent EKG tracing was personally interpreted by this provider.    Most Recent EKG Results  Results for orders placed or performed during the hospital encounter of 07/07/23   EKG 12-lead    Collection Time: 07/07/23  5:13 PM    Narrative    Test Reason : R07.9,    Vent. Rate : 086 BPM     Atrial Rate : 086 BPM     P-R Int : 170 ms          QRS Dur : 092 ms      QT Int : 374 ms       P-R-T Axes : 046 -54 074 degrees     QTc Int : 447 ms    Normal sinus rhythm  Incomplete right bundle branch block  Left anterior fascicular block  Voltage criteria for left ventricular hypertrophy  Cannot rule out Septal infarct ,age undetermined  Abnormal ECG  When compared with  ECG of 21-JUN-2023 09:22,  Minimal criteria for Septal infarct are now Present  Confirmed by Clint Garcia MD ([id number]) on 7/10/2023 5:40:13 PM    Referred By: CHEMA   SELF           Confirmed By:Clint Garcia MD       Most Recent Echocardiogram Results  Results for orders placed during the hospital encounter of 07/07/23    Echo    Interpretation Summary  · The left ventricle is normal in size with concentric hypertrophy and normal systolic function.  · The estimated ejection fraction is 60%.  · Normal left ventricular diastolic function.  · Normal right ventricular size with normal right ventricular systolic function.  · Mild-to-moderate aortic regurgitation.      Most Recent Nuclear Stress Test Results  Results for orders placed during the hospital encounter of 04/05/23    Nuclear Stress - Cardiology Interpreted    Interpretation Summary    Normal myocardial perfusion scan. There is no evidence of myocardial ischemia or infarction.    There is a  mild intensity fixed perfusion abnormality in the inferobasilar wall of the left ventricle secondary to diaphragm attenuation.    The gated perfusion images showed an ejection fraction of 63% at rest.    There is normal wall motion at rest.    The ECG portion of the study is negative for ischemia.    The patient reported no chest pain during the stress test.    There were no arrhythmias during stress.      Most Recent Cardiac PET Stress Test Results  No results found for this or any previous visit.      Most Recent Cardiovascular Angiogram results  Results for orders placed during the hospital encounter of 07/07/23    Cardiac catheterization    Conclusion    The Dist RCA lesion was 50% stenosed.    The Dist LAD lesion was 65% stenosed.    The pre-procedure left ventricular end diastolic pressure was 10.    The estimated blood loss was none.    There was non-obstructive coronary artery disease..PATENT STENT IN DIAGONAL    The procedure log was documented by  Documenter: Shelby Morton, RT and verified by Ajay Hunter MD.    Date: 7/10/2023  Time: 2:44 PM      Other Most Recent Cardiology Results  Results for orders placed during the hospital encounter of 07/07/23    CARDIAC MONITORING STRIPS        Assessment:       1. Aortic atherosclerosis    2. Aortic valve insufficiency, etiology of cardiac valve disease unspecified    3. Coronary artery disease, unspecified vessel or lesion type, unspecified whether angina present, unspecified whether native or transplanted heart    4. Coronary artery disease involving native coronary artery of native heart without angina pectoris    5. Essential hypertension     6. Mixed hyperlipidemia    7. Pericarditis, unspecified chronicity, unspecified type    8. Renovascular hypertension         Plan:     Symptoms OK today, getting GI workup  BP/Pulse OK today  Most recent echocardiogram reviewed personally   LHC reviewed personally    Continue amlodipine 5 mg PO Daily  Continue aspirin 81 mg PO Daily  Continue Plavix 75 mg PO Daily   Continue colchicine 0.6 mg PO BID  Continue doxazosin 2 mg PO Daily   Continue Imdur 30 mg PO daily  Continue losartan 50 mg PO Daily   Continue metoprolol tartrate 12.5 mg PO BID    Continue other cardiac medications  Mediterranean Diet/Cardiovascular Exercise Program    Patient queried and all questions were answered.    F/u in 9 months to reassess      Signed:    Bryant Werner MD  8/7/2023 3:59 PM

## 2023-08-07 ENCOUNTER — OFFICE VISIT (OUTPATIENT)
Dept: CARDIOLOGY | Facility: CLINIC | Age: 63
End: 2023-08-07
Payer: MEDICARE

## 2023-08-07 VITALS
SYSTOLIC BLOOD PRESSURE: 126 MMHG | HEART RATE: 78 BPM | HEIGHT: 66 IN | BODY MASS INDEX: 31.67 KG/M2 | DIASTOLIC BLOOD PRESSURE: 70 MMHG | WEIGHT: 197.06 LBS

## 2023-08-07 DIAGNOSIS — E11.22 HYPERTENSION ASSOCIATED WITH STAGE 3 CHRONIC KIDNEY DISEASE DUE TO TYPE 2 DIABETES MELLITUS: Chronic | ICD-10-CM

## 2023-08-07 DIAGNOSIS — I25.10 CORONARY ARTERY DISEASE, UNSPECIFIED VESSEL OR LESION TYPE, UNSPECIFIED WHETHER ANGINA PRESENT, UNSPECIFIED WHETHER NATIVE OR TRANSPLANTED HEART: Chronic | ICD-10-CM

## 2023-08-07 DIAGNOSIS — E78.2 MIXED HYPERLIPIDEMIA: ICD-10-CM

## 2023-08-07 DIAGNOSIS — I31.9 PERICARDITIS, UNSPECIFIED CHRONICITY, UNSPECIFIED TYPE: ICD-10-CM

## 2023-08-07 DIAGNOSIS — I35.1 AORTIC VALVE INSUFFICIENCY, ETIOLOGY OF CARDIAC VALVE DISEASE UNSPECIFIED: Chronic | ICD-10-CM

## 2023-08-07 DIAGNOSIS — I25.10 CORONARY ARTERY DISEASE INVOLVING NATIVE CORONARY ARTERY OF NATIVE HEART WITHOUT ANGINA PECTORIS: ICD-10-CM

## 2023-08-07 DIAGNOSIS — N18.30 HYPERTENSION ASSOCIATED WITH STAGE 3 CHRONIC KIDNEY DISEASE DUE TO TYPE 2 DIABETES MELLITUS: Chronic | ICD-10-CM

## 2023-08-07 DIAGNOSIS — I15.0 RENOVASCULAR HYPERTENSION: ICD-10-CM

## 2023-08-07 DIAGNOSIS — I70.0 AORTIC ATHEROSCLEROSIS: Primary | ICD-10-CM

## 2023-08-07 DIAGNOSIS — I12.9 HYPERTENSION ASSOCIATED WITH STAGE 3 CHRONIC KIDNEY DISEASE DUE TO TYPE 2 DIABETES MELLITUS: Chronic | ICD-10-CM

## 2023-08-07 PROCEDURE — 1160F PR REVIEW ALL MEDS BY PRESCRIBER/CLIN PHARMACIST DOCUMENTED: ICD-10-PCS | Mod: CPTII,S$GLB,, | Performed by: INTERNAL MEDICINE

## 2023-08-07 PROCEDURE — 3061F PR NEG MICROALBUMINURIA RESULT DOCUMENTED/REVIEW: ICD-10-PCS | Mod: CPTII,S$GLB,, | Performed by: INTERNAL MEDICINE

## 2023-08-07 PROCEDURE — 3074F PR MOST RECENT SYSTOLIC BLOOD PRESSURE < 130 MM HG: ICD-10-PCS | Mod: CPTII,S$GLB,, | Performed by: INTERNAL MEDICINE

## 2023-08-07 PROCEDURE — 3288F FALL RISK ASSESSMENT DOCD: CPT | Mod: CPTII,S$GLB,, | Performed by: INTERNAL MEDICINE

## 2023-08-07 PROCEDURE — 1160F RVW MEDS BY RX/DR IN RCRD: CPT | Mod: CPTII,S$GLB,, | Performed by: INTERNAL MEDICINE

## 2023-08-07 PROCEDURE — 3078F DIAST BP <80 MM HG: CPT | Mod: CPTII,S$GLB,, | Performed by: INTERNAL MEDICINE

## 2023-08-07 PROCEDURE — 4010F ACE/ARB THERAPY RXD/TAKEN: CPT | Mod: CPTII,S$GLB,, | Performed by: INTERNAL MEDICINE

## 2023-08-07 PROCEDURE — 3044F HG A1C LEVEL LT 7.0%: CPT | Mod: CPTII,S$GLB,, | Performed by: INTERNAL MEDICINE

## 2023-08-07 PROCEDURE — 1101F PT FALLS ASSESS-DOCD LE1/YR: CPT | Mod: CPTII,S$GLB,, | Performed by: INTERNAL MEDICINE

## 2023-08-07 PROCEDURE — 3008F PR BODY MASS INDEX (BMI) DOCUMENTED: ICD-10-PCS | Mod: CPTII,S$GLB,, | Performed by: INTERNAL MEDICINE

## 2023-08-07 PROCEDURE — 1157F ADVNC CARE PLAN IN RCRD: CPT | Mod: CPTII,S$GLB,, | Performed by: INTERNAL MEDICINE

## 2023-08-07 PROCEDURE — 1157F PR ADVANCE CARE PLAN OR EQUIV PRESENT IN MEDICAL RECORD: ICD-10-PCS | Mod: CPTII,S$GLB,, | Performed by: INTERNAL MEDICINE

## 2023-08-07 PROCEDURE — 4010F PR ACE/ARB THEARPY RXD/TAKEN: ICD-10-PCS | Mod: CPTII,S$GLB,, | Performed by: INTERNAL MEDICINE

## 2023-08-07 PROCEDURE — 99214 PR OFFICE/OUTPT VISIT, EST, LEVL IV, 30-39 MIN: ICD-10-PCS | Mod: S$GLB,,, | Performed by: INTERNAL MEDICINE

## 2023-08-07 PROCEDURE — 1159F MED LIST DOCD IN RCRD: CPT | Mod: CPTII,S$GLB,, | Performed by: INTERNAL MEDICINE

## 2023-08-07 PROCEDURE — 3061F NEG MICROALBUMINURIA REV: CPT | Mod: CPTII,S$GLB,, | Performed by: INTERNAL MEDICINE

## 2023-08-07 PROCEDURE — 99999 PR PBB SHADOW E&M-EST. PATIENT-LVL V: ICD-10-PCS | Mod: PBBFAC,,, | Performed by: INTERNAL MEDICINE

## 2023-08-07 PROCEDURE — 3066F NEPHROPATHY DOC TX: CPT | Mod: CPTII,S$GLB,, | Performed by: INTERNAL MEDICINE

## 2023-08-07 PROCEDURE — 3008F BODY MASS INDEX DOCD: CPT | Mod: CPTII,S$GLB,, | Performed by: INTERNAL MEDICINE

## 2023-08-07 PROCEDURE — 3078F PR MOST RECENT DIASTOLIC BLOOD PRESSURE < 80 MM HG: ICD-10-PCS | Mod: CPTII,S$GLB,, | Performed by: INTERNAL MEDICINE

## 2023-08-07 PROCEDURE — 3288F PR FALLS RISK ASSESSMENT DOCUMENTED: ICD-10-PCS | Mod: CPTII,S$GLB,, | Performed by: INTERNAL MEDICINE

## 2023-08-07 PROCEDURE — 99214 OFFICE O/P EST MOD 30 MIN: CPT | Mod: S$GLB,,, | Performed by: INTERNAL MEDICINE

## 2023-08-07 PROCEDURE — 1159F PR MEDICATION LIST DOCUMENTED IN MEDICAL RECORD: ICD-10-PCS | Mod: CPTII,S$GLB,, | Performed by: INTERNAL MEDICINE

## 2023-08-07 PROCEDURE — 3044F PR MOST RECENT HEMOGLOBIN A1C LEVEL <7.0%: ICD-10-PCS | Mod: CPTII,S$GLB,, | Performed by: INTERNAL MEDICINE

## 2023-08-07 PROCEDURE — 3074F SYST BP LT 130 MM HG: CPT | Mod: CPTII,S$GLB,, | Performed by: INTERNAL MEDICINE

## 2023-08-07 PROCEDURE — 1101F PR PT FALLS ASSESS DOC 0-1 FALLS W/OUT INJ PAST YR: ICD-10-PCS | Mod: CPTII,S$GLB,, | Performed by: INTERNAL MEDICINE

## 2023-08-07 PROCEDURE — 3066F PR DOCUMENTATION OF TREATMENT FOR NEPHROPATHY: ICD-10-PCS | Mod: CPTII,S$GLB,, | Performed by: INTERNAL MEDICINE

## 2023-08-07 PROCEDURE — 99999 PR PBB SHADOW E&M-EST. PATIENT-LVL V: CPT | Mod: PBBFAC,,, | Performed by: INTERNAL MEDICINE

## 2023-08-14 PROBLEM — N39.0 URINARY TRACT INFECTION WITHOUT HEMATURIA: Status: RESOLVED | Noted: 2019-04-02 | Resolved: 2023-08-14

## 2023-08-21 ENCOUNTER — OFFICE VISIT (OUTPATIENT)
Dept: PRIMARY CARE CLINIC | Facility: CLINIC | Age: 63
End: 2023-08-21
Payer: MEDICARE

## 2023-08-21 VITALS
BODY MASS INDEX: 30.88 KG/M2 | HEIGHT: 66 IN | WEIGHT: 192.13 LBS | SYSTOLIC BLOOD PRESSURE: 138 MMHG | RESPIRATION RATE: 18 BRPM | OXYGEN SATURATION: 96 % | TEMPERATURE: 99 F | DIASTOLIC BLOOD PRESSURE: 74 MMHG | HEART RATE: 81 BPM

## 2023-08-21 DIAGNOSIS — M25.50 MULTIPLE JOINT PAIN: Primary | ICD-10-CM

## 2023-08-21 PROCEDURE — 3078F DIAST BP <80 MM HG: CPT | Mod: CPTII,S$GLB,, | Performed by: FAMILY MEDICINE

## 2023-08-21 PROCEDURE — 3044F PR MOST RECENT HEMOGLOBIN A1C LEVEL <7.0%: ICD-10-PCS | Mod: CPTII,S$GLB,, | Performed by: FAMILY MEDICINE

## 2023-08-21 PROCEDURE — 4010F ACE/ARB THERAPY RXD/TAKEN: CPT | Mod: CPTII,S$GLB,, | Performed by: FAMILY MEDICINE

## 2023-08-21 PROCEDURE — 3075F PR MOST RECENT SYSTOLIC BLOOD PRESS GE 130-139MM HG: ICD-10-PCS | Mod: CPTII,S$GLB,, | Performed by: FAMILY MEDICINE

## 2023-08-21 PROCEDURE — 3066F PR DOCUMENTATION OF TREATMENT FOR NEPHROPATHY: ICD-10-PCS | Mod: CPTII,S$GLB,, | Performed by: FAMILY MEDICINE

## 2023-08-21 PROCEDURE — 1157F PR ADVANCE CARE PLAN OR EQUIV PRESENT IN MEDICAL RECORD: ICD-10-PCS | Mod: CPTII,S$GLB,, | Performed by: FAMILY MEDICINE

## 2023-08-21 PROCEDURE — 99999 PR PBB SHADOW E&M-EST. PATIENT-LVL V: CPT | Mod: PBBFAC,,, | Performed by: FAMILY MEDICINE

## 2023-08-21 PROCEDURE — 4010F PR ACE/ARB THEARPY RXD/TAKEN: ICD-10-PCS | Mod: CPTII,S$GLB,, | Performed by: FAMILY MEDICINE

## 2023-08-21 PROCEDURE — 99999 PR PBB SHADOW E&M-EST. PATIENT-LVL V: ICD-10-PCS | Mod: PBBFAC,,, | Performed by: FAMILY MEDICINE

## 2023-08-21 PROCEDURE — 1159F MED LIST DOCD IN RCRD: CPT | Mod: CPTII,S$GLB,, | Performed by: FAMILY MEDICINE

## 2023-08-21 PROCEDURE — 3008F BODY MASS INDEX DOCD: CPT | Mod: CPTII,S$GLB,, | Performed by: FAMILY MEDICINE

## 2023-08-21 PROCEDURE — 3061F NEG MICROALBUMINURIA REV: CPT | Mod: CPTII,S$GLB,, | Performed by: FAMILY MEDICINE

## 2023-08-21 PROCEDURE — 99214 PR OFFICE/OUTPT VISIT, EST, LEVL IV, 30-39 MIN: ICD-10-PCS | Mod: S$GLB,,, | Performed by: FAMILY MEDICINE

## 2023-08-21 PROCEDURE — 3288F FALL RISK ASSESSMENT DOCD: CPT | Mod: CPTII,S$GLB,, | Performed by: FAMILY MEDICINE

## 2023-08-21 PROCEDURE — 3044F HG A1C LEVEL LT 7.0%: CPT | Mod: CPTII,S$GLB,, | Performed by: FAMILY MEDICINE

## 2023-08-21 PROCEDURE — 3061F PR NEG MICROALBUMINURIA RESULT DOCUMENTED/REVIEW: ICD-10-PCS | Mod: CPTII,S$GLB,, | Performed by: FAMILY MEDICINE

## 2023-08-21 PROCEDURE — 3008F PR BODY MASS INDEX (BMI) DOCUMENTED: ICD-10-PCS | Mod: CPTII,S$GLB,, | Performed by: FAMILY MEDICINE

## 2023-08-21 PROCEDURE — 1101F PT FALLS ASSESS-DOCD LE1/YR: CPT | Mod: CPTII,S$GLB,, | Performed by: FAMILY MEDICINE

## 2023-08-21 PROCEDURE — 1160F PR REVIEW ALL MEDS BY PRESCRIBER/CLIN PHARMACIST DOCUMENTED: ICD-10-PCS | Mod: CPTII,S$GLB,, | Performed by: FAMILY MEDICINE

## 2023-08-21 PROCEDURE — 1160F RVW MEDS BY RX/DR IN RCRD: CPT | Mod: CPTII,S$GLB,, | Performed by: FAMILY MEDICINE

## 2023-08-21 PROCEDURE — 99214 OFFICE O/P EST MOD 30 MIN: CPT | Mod: S$GLB,,, | Performed by: FAMILY MEDICINE

## 2023-08-21 PROCEDURE — 1101F PR PT FALLS ASSESS DOC 0-1 FALLS W/OUT INJ PAST YR: ICD-10-PCS | Mod: CPTII,S$GLB,, | Performed by: FAMILY MEDICINE

## 2023-08-21 PROCEDURE — 1157F ADVNC CARE PLAN IN RCRD: CPT | Mod: CPTII,S$GLB,, | Performed by: FAMILY MEDICINE

## 2023-08-21 PROCEDURE — 1159F PR MEDICATION LIST DOCUMENTED IN MEDICAL RECORD: ICD-10-PCS | Mod: CPTII,S$GLB,, | Performed by: FAMILY MEDICINE

## 2023-08-21 PROCEDURE — 3078F PR MOST RECENT DIASTOLIC BLOOD PRESSURE < 80 MM HG: ICD-10-PCS | Mod: CPTII,S$GLB,, | Performed by: FAMILY MEDICINE

## 2023-08-21 PROCEDURE — 3066F NEPHROPATHY DOC TX: CPT | Mod: CPTII,S$GLB,, | Performed by: FAMILY MEDICINE

## 2023-08-21 PROCEDURE — 3288F PR FALLS RISK ASSESSMENT DOCUMENTED: ICD-10-PCS | Mod: CPTII,S$GLB,, | Performed by: FAMILY MEDICINE

## 2023-08-21 PROCEDURE — 3075F SYST BP GE 130 - 139MM HG: CPT | Mod: CPTII,S$GLB,, | Performed by: FAMILY MEDICINE

## 2023-08-21 RX ORDER — COLCHICINE 0.6 MG/1
0.6 TABLET ORAL 2 TIMES DAILY
Qty: 180 TABLET | Refills: 3 | Status: SHIPPED | OUTPATIENT
Start: 2023-08-21 | End: 2023-11-07

## 2023-08-21 RX ORDER — DEXTROMETHORPHAN HYDROBROMIDE, GUAIFENESIN 5; 100 MG/5ML; MG/5ML
650 LIQUID ORAL EVERY 8 HOURS
COMMUNITY

## 2023-08-21 NOTE — ASSESSMENT & PLAN NOTE
Recent onset of multiple joint pains.  Some with evidence of inflammation/redness swelling.  No fever or chills.  Tylenol did help so she can continue this for now.  Recommend NSAID avoidance because of kidney history.  Would hold off on systemic corticosteroids at this time unless Tylenol not continuing to help.  Will check labs including inflammatory markers.  I do recommend Rheumatology consultation and she is agreeable but would prefer to wait and see how she does with Tylenol over the next few days and wait for lab results.  This because she understands she would likely have to travel to the [place] for rheumatology consultation in a timely manner.  So she should send me an update and will touch base with her after lab results return.  Also note that she does report a history of gout.  She remains on colchicine twice a day but this was started for pericarditis I believe and was never adjusted or discontinued, I will refill for now but can likely decrease the dosage now that has been a year since her pericarditis.  For now she will continue the colchicine and will check a uric acid level.

## 2023-08-21 NOTE — PROGRESS NOTES
THIS DOCUMENT WAS MADE IN PART WITH VOICE RECOGNITION SOFTWARE.  OCCASIONALLY THIS SOFTWARE WILL MISINTERPRET WORDS OR PHRASES.      Primary Care Provider Appointment   [facility]       Patient ID: Andra Newell is a 63 y.o. female.    ASSESSMENT/PLAN by Problem List:    1. Multiple joint pain  Assessment & Plan:  Recent onset of multiple joint pains.  Some with evidence of inflammation/redness swelling.  No fever or chills.  Tylenol did help so she can continue this for now.  Recommend NSAID avoidance because of kidney history.  Would hold off on systemic corticosteroids at this time unless Tylenol not continuing to help.  Will check labs including inflammatory markers.  I do recommend Rheumatology consultation and she is agreeable but would prefer to wait and see how she does with Tylenol over the next few days and wait for lab results.  This because she understands she would likely have to travel to the [place] for rheumatology consultation in a timely manner.  So she should send me an update and will touch base with her after lab results return.  Also note that she does report a history of gout.  She remains on colchicine twice a day but this was started for pericarditis I believe and was never adjusted or discontinued, I will refill for now but can likely decrease the dosage now that has been a year since her pericarditis.  For now she will continue the colchicine and will check a uric acid level.    Orders:  -     Ambulatory referral/consult to Rheumatology; Future; Expected date: 08/28/2023  -     C-Reactive Protein; Future; Expected date: 08/21/2023  -     Cyclic Citrullinated Peptide Antibody, IgG; Future; Expected date: 08/21/2023  -     Rheumatoid Factor; Future; Expected date: 08/21/2023  -     Sedimentation rate; Future; Expected date: 08/21/2023  -     Uric Acid; Future; Expected date: 08/21/2023  -     CBC Auto Differential; Future; Expected date:  08/21/2023    Other orders  -     colchicine, gout, (COLCRYS) 0.6 mg tablet; Take 1 tablet (0.6 mg total) by mouth 2 (two) times daily.  Dispense: 180 tablet; Refill: 3         Subjective:     Chief Complaint   Patient presents with    Generalized Body Aches     R shoulder, R wrist and fingers, R knee and L pinky finger   Hit R knee 1 week ago on the wall     I have reviewed the information entered by the ancillary staff regarding the chief complaint as well as the related history.    HPI    Patient is a/an 63 y.o.  female     New onset of multiple joint pains  No fever or chills, few night sweats week or two ago, but possibly hypoglycemia, has not recurred    Right knee medial swelling, did bump lateral knee last week, was mildly sore., now stiff, difficult to bend.  But unsure if it is related to initial contusion    Right hand, distal hand and fingers sore, worse in am, takes awhile to bend and loosen, other joints also appear to be worse in the morning    Left 5 th finger stiff, red, not warm now    Right shoulder, no injury, sore, but improving, pain when lifting    Mother had arthritis hands, she was a beautician.  No known family history of autoimmune arthritis    Tylenol arthritis did help after a few doses    For complete problem list, past medical history, surgical history, social history, etc., see appropriate section in the electronic medical record    Review of Systems   Respiratory: Negative.     Cardiovascular: Negative.    Gastrointestinal: Negative.    Genitourinary: Negative.    Musculoskeletal:  Positive for arthralgias.       Objective     Physical Exam  Vitals reviewed.   Constitutional:       General: She is not in acute distress.     Appearance: She is well-developed. She is not diaphoretic.   HENT:      Head: Normocephalic and atraumatic.   Eyes:      General: No scleral icterus.  Pulmonary:      Effort: Pulmonary effort is normal. No respiratory distress.   Musculoskeletal:      Comments:  "Right shoulder with decreased range of motion and some tenderness with abduction.  Not red or swollen    Right knee with mild medial swelling, no redness or warmth    Right hand maybe some mild diffuse swelling across the posterior MP joints but not red, no deformities     Left hand with some mild redness and tenderness to PIP joint laterally    Left knee with postsurgical changes but no redness or swelling    No ankle swelling   Neurological:      Mental Status: She is alert and oriented to person, place, and time.   Psychiatric:         Mood and Affect: Mood normal.         Behavior: Behavior normal.       Vitals:    08/21/23 1301   BP: 138/74   BP Location: Right arm   Patient Position: Sitting   BP Method: Medium (Manual)   Pulse: 81   Resp: 18   Temp: 98.7 °F (37.1 °C)   TempSrc: Oral   SpO2: 96%   Weight: 87.1 kg (192 lb 2.1 oz)   Height: 5' 6" (1.676 m)       "

## 2023-08-22 ENCOUNTER — LAB VISIT (OUTPATIENT)
Dept: LAB | Facility: HOSPITAL | Age: 63
End: 2023-08-22
Attending: FAMILY MEDICINE
Payer: MEDICARE

## 2023-08-22 ENCOUNTER — PATIENT MESSAGE (OUTPATIENT)
Dept: GASTROENTEROLOGY | Facility: CLINIC | Age: 63
End: 2023-08-22
Payer: MEDICARE

## 2023-08-22 DIAGNOSIS — M25.50 MULTIPLE JOINT PAIN: ICD-10-CM

## 2023-08-22 LAB
BASOPHILS # BLD AUTO: 0.03 K/UL (ref 0–0.2)
BASOPHILS NFR BLD: 0.6 % (ref 0–1.9)
CRP SERPL-MCNC: 6.1 MG/L (ref 0–8.2)
DIFFERENTIAL METHOD: ABNORMAL
EOSINOPHIL # BLD AUTO: 0.4 K/UL (ref 0–0.5)
EOSINOPHIL NFR BLD: 7.5 % (ref 0–8)
ERYTHROCYTE [DISTWIDTH] IN BLOOD BY AUTOMATED COUNT: 15 % (ref 11.5–14.5)
ERYTHROCYTE [SEDIMENTATION RATE] IN BLOOD BY PHOTOMETRIC METHOD: 39 MM/HR (ref 0–36)
HCT VFR BLD AUTO: 36.7 % (ref 37–48.5)
HGB BLD-MCNC: 11.1 G/DL (ref 12–16)
IMM GRANULOCYTES # BLD AUTO: 0.01 K/UL (ref 0–0.04)
IMM GRANULOCYTES NFR BLD AUTO: 0.2 % (ref 0–0.5)
LYMPHOCYTES # BLD AUTO: 1 K/UL (ref 1–4.8)
LYMPHOCYTES NFR BLD: 20.7 % (ref 18–48)
MCH RBC QN AUTO: 27.1 PG (ref 27–31)
MCHC RBC AUTO-ENTMCNC: 30.2 G/DL (ref 32–36)
MCV RBC AUTO: 90 FL (ref 82–98)
MONOCYTES # BLD AUTO: 0.3 K/UL (ref 0.3–1)
MONOCYTES NFR BLD: 6.6 % (ref 4–15)
NEUTROPHILS # BLD AUTO: 3.1 K/UL (ref 1.8–7.7)
NEUTROPHILS NFR BLD: 64.4 % (ref 38–73)
NRBC BLD-RTO: 0 /100 WBC
PLATELET # BLD AUTO: 159 K/UL (ref 150–450)
PMV BLD AUTO: 10.8 FL (ref 9.2–12.9)
RBC # BLD AUTO: 4.1 M/UL (ref 4–5.4)
RHEUMATOID FACT SERPL-ACNC: <13 IU/ML (ref 0–15)
URATE SERPL-MCNC: 7.6 MG/DL (ref 2.4–5.7)
WBC # BLD AUTO: 4.83 K/UL (ref 3.9–12.7)

## 2023-08-22 PROCEDURE — 86200 CCP ANTIBODY: CPT | Performed by: FAMILY MEDICINE

## 2023-08-22 PROCEDURE — 36415 COLL VENOUS BLD VENIPUNCTURE: CPT | Mod: PN | Performed by: FAMILY MEDICINE

## 2023-08-22 PROCEDURE — 86431 RHEUMATOID FACTOR QUANT: CPT | Performed by: FAMILY MEDICINE

## 2023-08-22 PROCEDURE — 85652 RBC SED RATE AUTOMATED: CPT | Performed by: FAMILY MEDICINE

## 2023-08-22 PROCEDURE — 86140 C-REACTIVE PROTEIN: CPT | Performed by: FAMILY MEDICINE

## 2023-08-22 PROCEDURE — 84550 ASSAY OF BLOOD/URIC ACID: CPT | Performed by: FAMILY MEDICINE

## 2023-08-22 PROCEDURE — 85025 COMPLETE CBC W/AUTO DIFF WBC: CPT | Performed by: FAMILY MEDICINE

## 2023-08-23 ENCOUNTER — PATIENT MESSAGE (OUTPATIENT)
Dept: PRIMARY CARE CLINIC | Facility: CLINIC | Age: 63
End: 2023-08-23
Payer: MEDICARE

## 2023-08-23 DIAGNOSIS — E79.0 HYPERURICEMIA: Primary | ICD-10-CM

## 2023-08-23 LAB — CCP AB SER IA-ACNC: 0.5 U/ML

## 2023-08-24 RX ORDER — ALLOPURINOL 100 MG/1
100 TABLET ORAL DAILY
Qty: 90 TABLET | Refills: 1 | Status: SHIPPED | OUTPATIENT
Start: 2023-08-24 | End: 2024-02-16

## 2023-08-24 NOTE — TELEPHONE ENCOUNTER
[employer] message sent to pt discussing MD response in its entirety.

## 2023-08-24 NOTE — TELEPHONE ENCOUNTER
I sent in a prescription for allopurinol 100 mg.  Please check a uric acid in about 4-6 weeks.      It is important that she remains on colchicine when she starts the allopurinol.  Occasionally allopurinol can trigger an acute gout attack.  The colchicine will prevent this.

## 2023-08-25 ENCOUNTER — TELEPHONE (OUTPATIENT)
Dept: PRIMARY CARE CLINIC | Facility: CLINIC | Age: 63
End: 2023-08-25
Payer: MEDICARE

## 2023-08-25 NOTE — TELEPHONE ENCOUNTER
Spoke with pt and informed her that AWV was scheduled with NP that is assisting Dr. Marina to get pts AWVs completed.  Pt verbalized understanding and will see Kaila Segovia for AWV.

## 2023-08-25 NOTE — TELEPHONE ENCOUNTER
----- Message from Anna Sage sent at 8/25/2023  9:20 AM CDT -----  Regarding: Needs return call  Type: Needs Medical Advice  Who Called:  Andramahendra Stevenson Call Back Number: [phone]    Additional Information: Pt was wondering why she was doing the AWV with Kaila Juliette and not him, please call to najma.

## 2023-08-29 NOTE — PROGRESS NOTES
Attempted to contact patient in order to complete pre assessment questions.     No answer. Left message to return call to [phone] option 4      Aida Go RN  Endoscopy Procedure Pre Assessment RN     TRANSPLANT NOTE:      ORGAN: LEFT KIDNEY    Disease Etiology: Diabetes Mellitus - Type II  Donor CMV Status:   Donor HCV Status:   Donor HBcAb:   Donor HBV DERRICK: Organ record is missing.  Donor HCV DERRICK: Organ record is missing.      Andra Newell is a 58 y.o. female s/p  Living   kidney transplant on 1/14/2019 (Kidney) for Diabetes Mellitus - Type II.   This patient will follow the Campath Induction protocol.  This patients immunosuppression will include Campath for induction with early steroid withdrawal and Prograf and Cellcept for maintenance immunosuppression.  Opportunistic infection prophylaxis will include Valcyte for 6 months (CMV D + , R - ), Bactrim for 1 year, and nystatin for 4 weeks.  Patient is to begin self medications today, and I plan to meet with this patient and his/her support person on POD #2 to review the medication section of the Kidney Transplant Education Manual.  I have reviewed the pre-op medications and have restarted those, as appropriate.

## 2023-09-03 ENCOUNTER — PATIENT MESSAGE (OUTPATIENT)
Dept: GASTROENTEROLOGY | Facility: CLINIC | Age: 63
End: 2023-09-03
Payer: MEDICARE

## 2023-09-12 ENCOUNTER — HOSPITAL ENCOUNTER (OUTPATIENT)
Dept: RADIOLOGY | Facility: HOSPITAL | Age: 63
Discharge: HOME | End: 2023-09-12
Attending: NURSE PRACTITIONER
Payer: MEDICARE

## 2023-09-12 DIAGNOSIS — Z78.0 POSTMENOPAUSAL: ICD-10-CM

## 2023-09-12 PROCEDURE — 77080 DXA BONE DENSITY AXIAL SKELETON 1 OR MORE SITES: ICD-10-PCS | Mod: 26,,, | Performed by: RADIOLOGY

## 2023-09-12 PROCEDURE — 77080 DXA BONE DENSITY AXIAL: CPT | Mod: 26,,, | Performed by: RADIOLOGY

## 2023-09-12 PROCEDURE — 77080 DXA BONE DENSITY AXIAL: CPT | Mod: TC,PO

## 2023-09-14 ENCOUNTER — PATIENT MESSAGE (OUTPATIENT)
Dept: SPORTS MEDICINE | Facility: CLINIC | Age: 63
End: 2023-09-14
Payer: MEDICARE

## 2023-09-15 ENCOUNTER — TELEPHONE (OUTPATIENT)
Dept: PRIMARY CARE CLINIC | Facility: CLINIC | Age: 63
End: 2023-09-15
Payer: MEDICARE

## 2023-09-15 NOTE — TELEPHONE ENCOUNTER
Spoke with pt, she reports that she had a DM eye exam earlier this year with Dr. Soniya Ortega.  Inquired if I could assist pt with scheduling her mammogram and she reports that she will schedule it on her phone after she hangs up with me.      Called Dr. Ortega's office and requested a copy of the DFE to be faxed to us.

## 2023-09-21 RX ORDER — ONDANSETRON 4 MG/1
TABLET, FILM COATED ORAL
Qty: 20 TABLET | Refills: 1 | Status: SHIPPED | OUTPATIENT
Start: 2023-09-21

## 2023-09-22 ENCOUNTER — TELEPHONE (OUTPATIENT)
Dept: PRIMARY CARE CLINIC | Facility: CLINIC | Age: 63
End: 2023-09-22
Payer: MEDICARE

## 2023-09-22 NOTE — TELEPHONE ENCOUNTER
Spoke with pt, she reports that she is doing ok at this time.  Pt was mildly emotional about a misunderstanding with her lab appt today.  Pt voices no concerns at this time.

## 2023-09-25 ENCOUNTER — HOSPITAL ENCOUNTER (OUTPATIENT)
Dept: RADIOLOGY | Facility: HOSPITAL | Age: 63
Discharge: HOME | End: 2023-09-25
Attending: PHYSICIAN ASSISTANT
Payer: MEDICARE

## 2023-09-25 ENCOUNTER — OFFICE VISIT (OUTPATIENT)
Dept: SPORTS MEDICINE | Facility: CLINIC | Age: 63
End: 2023-09-25
Payer: MEDICARE

## 2023-09-25 ENCOUNTER — TELEPHONE (OUTPATIENT)
Dept: SPORTS MEDICINE | Facility: CLINIC | Age: 63
End: 2023-09-25
Payer: MEDICARE

## 2023-09-25 DIAGNOSIS — M25.561 ACUTE PAIN OF RIGHT KNEE: Primary | ICD-10-CM

## 2023-09-25 DIAGNOSIS — G89.29 CHRONIC PAIN OF RIGHT KNEE: ICD-10-CM

## 2023-09-25 DIAGNOSIS — F40.240 CLAUSTROPHOBIA: ICD-10-CM

## 2023-09-25 DIAGNOSIS — M25.561 CHRONIC PAIN OF RIGHT KNEE: ICD-10-CM

## 2023-09-25 PROCEDURE — 73564 XR KNEE ORTHO BILAT WITH FLEXION: ICD-10-PCS | Mod: 26,50,, | Performed by: RADIOLOGY

## 2023-09-25 PROCEDURE — 1159F PR MEDICATION LIST DOCUMENTED IN MEDICAL RECORD: ICD-10-PCS | Mod: CPTII,S$GLB,, | Performed by: PHYSICIAN ASSISTANT

## 2023-09-25 PROCEDURE — 4010F PR ACE/ARB THEARPY RXD/TAKEN: ICD-10-PCS | Mod: CPTII,S$GLB,, | Performed by: PHYSICIAN ASSISTANT

## 2023-09-25 PROCEDURE — 1160F PR REVIEW ALL MEDS BY PRESCRIBER/CLIN PHARMACIST DOCUMENTED: ICD-10-PCS | Mod: CPTII,S$GLB,, | Performed by: PHYSICIAN ASSISTANT

## 2023-09-25 PROCEDURE — 3061F PR NEG MICROALBUMINURIA RESULT DOCUMENTED/REVIEW: ICD-10-PCS | Mod: CPTII,S$GLB,, | Performed by: PHYSICIAN ASSISTANT

## 2023-09-25 PROCEDURE — 99214 OFFICE O/P EST MOD 30 MIN: CPT | Mod: S$GLB,,, | Performed by: PHYSICIAN ASSISTANT

## 2023-09-25 PROCEDURE — 99214 PR OFFICE/OUTPT VISIT, EST, LEVL IV, 30-39 MIN: ICD-10-PCS | Mod: S$GLB,,, | Performed by: PHYSICIAN ASSISTANT

## 2023-09-25 PROCEDURE — 3066F NEPHROPATHY DOC TX: CPT | Mod: CPTII,S$GLB,, | Performed by: PHYSICIAN ASSISTANT

## 2023-09-25 PROCEDURE — 1159F MED LIST DOCD IN RCRD: CPT | Mod: CPTII,S$GLB,, | Performed by: PHYSICIAN ASSISTANT

## 2023-09-25 PROCEDURE — 3044F HG A1C LEVEL LT 7.0%: CPT | Mod: CPTII,S$GLB,, | Performed by: PHYSICIAN ASSISTANT

## 2023-09-25 PROCEDURE — 3061F NEG MICROALBUMINURIA REV: CPT | Mod: CPTII,S$GLB,, | Performed by: PHYSICIAN ASSISTANT

## 2023-09-25 PROCEDURE — 99999 PR PBB SHADOW E&M-EST. PATIENT-LVL IV: CPT | Mod: PBBFAC,,, | Performed by: PHYSICIAN ASSISTANT

## 2023-09-25 PROCEDURE — 1157F ADVNC CARE PLAN IN RCRD: CPT | Mod: CPTII,S$GLB,, | Performed by: PHYSICIAN ASSISTANT

## 2023-09-25 PROCEDURE — 1160F RVW MEDS BY RX/DR IN RCRD: CPT | Mod: CPTII,S$GLB,, | Performed by: PHYSICIAN ASSISTANT

## 2023-09-25 PROCEDURE — 73564 X-RAY EXAM KNEE 4 OR MORE: CPT | Mod: TC,50

## 2023-09-25 PROCEDURE — 4010F ACE/ARB THERAPY RXD/TAKEN: CPT | Mod: CPTII,S$GLB,, | Performed by: PHYSICIAN ASSISTANT

## 2023-09-25 PROCEDURE — 3066F PR DOCUMENTATION OF TREATMENT FOR NEPHROPATHY: ICD-10-PCS | Mod: CPTII,S$GLB,, | Performed by: PHYSICIAN ASSISTANT

## 2023-09-25 PROCEDURE — 73564 X-RAY EXAM KNEE 4 OR MORE: CPT | Mod: 26,50,, | Performed by: RADIOLOGY

## 2023-09-25 PROCEDURE — 1157F PR ADVANCE CARE PLAN OR EQUIV PRESENT IN MEDICAL RECORD: ICD-10-PCS | Mod: CPTII,S$GLB,, | Performed by: PHYSICIAN ASSISTANT

## 2023-09-25 PROCEDURE — 99999 PR PBB SHADOW E&M-EST. PATIENT-LVL IV: ICD-10-PCS | Mod: PBBFAC,,, | Performed by: PHYSICIAN ASSISTANT

## 2023-09-25 PROCEDURE — 3044F PR MOST RECENT HEMOGLOBIN A1C LEVEL <7.0%: ICD-10-PCS | Mod: CPTII,S$GLB,, | Performed by: PHYSICIAN ASSISTANT

## 2023-09-25 NOTE — TELEPHONE ENCOUNTER
Called the patient in regards to requested call back. Brenda Mansfield PA-C has left the office for the day. A staff message was sent to the provider requesting medication for MRI. The provider if currently out of the office for the day. She will see the request upon her return to clinic tomorrow.

## 2023-09-25 NOTE — TELEPHONE ENCOUNTER
----- Message from Ayla Umana sent at 9/25/2023  3:26 PM CDT -----  Pt calling in regards to her MRI , needs her medication to take MRI       Confirmed patient's contact info below:  Contact Name: Andra Osiris  Phone Number: [phone]            [id number] [place] - [address]  [address]  [place] [address]  Phone: [phone] Fax: [phone]
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
## 2023-09-26 ENCOUNTER — PATIENT MESSAGE (OUTPATIENT)
Dept: SPORTS MEDICINE | Facility: CLINIC | Age: 63
End: 2023-09-26
Payer: MEDICARE

## 2023-09-26 ENCOUNTER — TELEPHONE (OUTPATIENT)
Dept: SPORTS MEDICINE | Facility: CLINIC | Age: 63
End: 2023-09-26
Payer: MEDICARE

## 2023-09-26 DIAGNOSIS — F40.240 CLAUSTROPHOBIA: Primary | ICD-10-CM

## 2023-09-26 RX ORDER — DIAZEPAM 5 MG/1
5 TABLET ORAL EVERY 6 HOURS PRN
Qty: 1 TABLET | Refills: 0 | Status: SHIPPED | OUTPATIENT
Start: 2023-09-26 | End: 2024-03-21

## 2023-09-26 NOTE — TELEPHONE ENCOUNTER
Spoke to the patient in regards to Valium medication she is needing for MRI. While on the call I informed the patient that Brenda Mansfield PA-C attempted to prescribe the requested medication but the system gave her a hard stop and she needed to verify of the patient has taken the medication with no complication in the past. The patient reports taking valium in the past with no complication. She reports a history of Kidney issues and stated that's probably why the system gave the hard stop. The patient offered to get clearance from her Kidney doctor if needed. Patient informed that she will be contacted if the clearance is needed.

## 2023-09-27 NOTE — ASSESSMENT & PLAN NOTE
Message left to return call to this RN PAL. Please transfer if available.    Direct number left for this RN PAL on message for return call.     RN called Zo to identify correct patient for prescription.     Hayde MONTEIRO RN, BSN, PHN, PAL (patient advocate liaison)   [facility]  [phone]     Metabolic syndrome pattern with low HDL, elevated triglycerides.  She remains on 10 mg rosuvastatin.

## 2023-09-28 ENCOUNTER — PATIENT MESSAGE (OUTPATIENT)
Dept: NEPHROLOGY | Facility: CLINIC | Age: 63
End: 2023-09-28
Payer: MEDICARE

## 2023-09-29 ENCOUNTER — HOSPITAL ENCOUNTER (OUTPATIENT)
Dept: RADIOLOGY | Facility: HOSPITAL | Age: 63
Discharge: HOME | End: 2023-09-29
Attending: PHYSICIAN ASSISTANT
Payer: MEDICARE

## 2023-09-29 DIAGNOSIS — M25.561 ACUTE PAIN OF RIGHT KNEE: ICD-10-CM

## 2023-09-29 PROCEDURE — 73721 MRI JNT OF LWR EXTRE W/O DYE: CPT | Mod: TC,RT

## 2023-09-29 PROCEDURE — 73721 MRI KNEE WITHOUT CONTRAST RIGHT: ICD-10-PCS | Mod: 26,RT,, | Performed by: INTERNAL MEDICINE

## 2023-09-29 PROCEDURE — 73721 MRI JNT OF LWR EXTRE W/O DYE: CPT | Mod: 26,RT,, | Performed by: INTERNAL MEDICINE

## 2023-09-29 NOTE — ASSESSMENT & PLAN NOTE
ISR of LAD stent  PTCA of diag On immunosepresive therapy per transplant team; may elevate BG readings       PCI of ISR of LAD stent  PTCA of diag

## 2023-10-02 ENCOUNTER — OFFICE VISIT (OUTPATIENT)
Dept: SPORTS MEDICINE | Facility: CLINIC | Age: 63
End: 2023-10-02
Payer: MEDICARE

## 2023-10-02 VITALS
SYSTOLIC BLOOD PRESSURE: 150 MMHG | DIASTOLIC BLOOD PRESSURE: 71 MMHG | HEART RATE: 61 BPM | WEIGHT: 186.63 LBS | BODY MASS INDEX: 30.12 KG/M2

## 2023-10-02 DIAGNOSIS — M25.561 ACUTE PAIN OF RIGHT KNEE: ICD-10-CM

## 2023-10-02 DIAGNOSIS — M94.261 CHONDROMALACIA OF RIGHT KNEE: Primary | ICD-10-CM

## 2023-10-02 DIAGNOSIS — M22.41 CHONDROMALACIA, PATELLA, RIGHT: ICD-10-CM

## 2023-10-02 PROCEDURE — 3066F NEPHROPATHY DOC TX: CPT | Mod: CPTII,S$GLB,, | Performed by: PHYSICIAN ASSISTANT

## 2023-10-02 PROCEDURE — 3066F PR DOCUMENTATION OF TREATMENT FOR NEPHROPATHY: ICD-10-PCS | Mod: CPTII,S$GLB,, | Performed by: PHYSICIAN ASSISTANT

## 2023-10-02 PROCEDURE — 99213 OFFICE O/P EST LOW 20 MIN: CPT | Mod: S$GLB,,, | Performed by: PHYSICIAN ASSISTANT

## 2023-10-02 PROCEDURE — 1159F PR MEDICATION LIST DOCUMENTED IN MEDICAL RECORD: ICD-10-PCS | Mod: CPTII,S$GLB,, | Performed by: PHYSICIAN ASSISTANT

## 2023-10-02 PROCEDURE — 4010F PR ACE/ARB THEARPY RXD/TAKEN: ICD-10-PCS | Mod: CPTII,S$GLB,, | Performed by: PHYSICIAN ASSISTANT

## 2023-10-02 PROCEDURE — 3061F NEG MICROALBUMINURIA REV: CPT | Mod: CPTII,S$GLB,, | Performed by: PHYSICIAN ASSISTANT

## 2023-10-02 PROCEDURE — 3078F DIAST BP <80 MM HG: CPT | Mod: CPTII,S$GLB,, | Performed by: PHYSICIAN ASSISTANT

## 2023-10-02 PROCEDURE — 1160F PR REVIEW ALL MEDS BY PRESCRIBER/CLIN PHARMACIST DOCUMENTED: ICD-10-PCS | Mod: CPTII,S$GLB,, | Performed by: PHYSICIAN ASSISTANT

## 2023-10-02 PROCEDURE — 99213 PR OFFICE/OUTPT VISIT, EST, LEVL III, 20-29 MIN: ICD-10-PCS | Mod: S$GLB,,, | Performed by: PHYSICIAN ASSISTANT

## 2023-10-02 PROCEDURE — 3008F BODY MASS INDEX DOCD: CPT | Mod: CPTII,S$GLB,, | Performed by: PHYSICIAN ASSISTANT

## 2023-10-02 PROCEDURE — 3008F PR BODY MASS INDEX (BMI) DOCUMENTED: ICD-10-PCS | Mod: CPTII,S$GLB,, | Performed by: PHYSICIAN ASSISTANT

## 2023-10-02 PROCEDURE — 3044F PR MOST RECENT HEMOGLOBIN A1C LEVEL <7.0%: ICD-10-PCS | Mod: CPTII,S$GLB,, | Performed by: PHYSICIAN ASSISTANT

## 2023-10-02 PROCEDURE — 3078F PR MOST RECENT DIASTOLIC BLOOD PRESSURE < 80 MM HG: ICD-10-PCS | Mod: CPTII,S$GLB,, | Performed by: PHYSICIAN ASSISTANT

## 2023-10-02 PROCEDURE — 3044F HG A1C LEVEL LT 7.0%: CPT | Mod: CPTII,S$GLB,, | Performed by: PHYSICIAN ASSISTANT

## 2023-10-02 PROCEDURE — 3077F SYST BP >= 140 MM HG: CPT | Mod: CPTII,S$GLB,, | Performed by: PHYSICIAN ASSISTANT

## 2023-10-02 PROCEDURE — 3061F PR NEG MICROALBUMINURIA RESULT DOCUMENTED/REVIEW: ICD-10-PCS | Mod: CPTII,S$GLB,, | Performed by: PHYSICIAN ASSISTANT

## 2023-10-02 PROCEDURE — 99999 PR PBB SHADOW E&M-EST. PATIENT-LVL IV: ICD-10-PCS | Mod: PBBFAC,,, | Performed by: PHYSICIAN ASSISTANT

## 2023-10-02 PROCEDURE — 1160F RVW MEDS BY RX/DR IN RCRD: CPT | Mod: CPTII,S$GLB,, | Performed by: PHYSICIAN ASSISTANT

## 2023-10-02 PROCEDURE — 1159F MED LIST DOCD IN RCRD: CPT | Mod: CPTII,S$GLB,, | Performed by: PHYSICIAN ASSISTANT

## 2023-10-02 PROCEDURE — 1157F ADVNC CARE PLAN IN RCRD: CPT | Mod: CPTII,S$GLB,, | Performed by: PHYSICIAN ASSISTANT

## 2023-10-02 PROCEDURE — 99999 PR PBB SHADOW E&M-EST. PATIENT-LVL IV: CPT | Mod: PBBFAC,,, | Performed by: PHYSICIAN ASSISTANT

## 2023-10-02 PROCEDURE — 4010F ACE/ARB THERAPY RXD/TAKEN: CPT | Mod: CPTII,S$GLB,, | Performed by: PHYSICIAN ASSISTANT

## 2023-10-02 PROCEDURE — 3077F PR MOST RECENT SYSTOLIC BLOOD PRESSURE >= 140 MM HG: ICD-10-PCS | Mod: CPTII,S$GLB,, | Performed by: PHYSICIAN ASSISTANT

## 2023-10-02 PROCEDURE — 1157F PR ADVANCE CARE PLAN OR EQUIV PRESENT IN MEDICAL RECORD: ICD-10-PCS | Mod: CPTII,S$GLB,, | Performed by: PHYSICIAN ASSISTANT
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
## 2023-10-08 ENCOUNTER — PATIENT MESSAGE (OUTPATIENT)
Dept: PRIMARY CARE CLINIC | Facility: CLINIC | Age: 63
End: 2023-10-08
Payer: MEDICARE

## 2023-10-09 ENCOUNTER — PATIENT MESSAGE (OUTPATIENT)
Dept: SPORTS MEDICINE | Facility: CLINIC | Age: 63
End: 2023-10-09
Payer: MEDICARE

## 2023-10-12 ENCOUNTER — TELEPHONE (OUTPATIENT)
Dept: FAMILY MEDICINE | Facility: CLINIC | Age: 63
End: 2023-10-12

## 2023-10-12 ENCOUNTER — OFFICE VISIT (OUTPATIENT)
Dept: FAMILY MEDICINE | Facility: CLINIC | Age: 63
End: 2023-10-12
Payer: MEDICARE

## 2023-10-12 ENCOUNTER — OFFICE VISIT (OUTPATIENT)
Dept: PRIMARY CARE CLINIC | Facility: CLINIC | Age: 63
End: 2023-10-12
Payer: MEDICARE

## 2023-10-12 VITALS
HEIGHT: 66 IN | SYSTOLIC BLOOD PRESSURE: 126 MMHG | RESPIRATION RATE: 18 BRPM | BODY MASS INDEX: 30.08 KG/M2 | DIASTOLIC BLOOD PRESSURE: 68 MMHG | OXYGEN SATURATION: 97 % | HEART RATE: 72 BPM | WEIGHT: 187.19 LBS | TEMPERATURE: 99 F

## 2023-10-12 VITALS
HEART RATE: 71 BPM | SYSTOLIC BLOOD PRESSURE: 126 MMHG | WEIGHT: 187.81 LBS | HEIGHT: 66 IN | OXYGEN SATURATION: 97 % | DIASTOLIC BLOOD PRESSURE: 72 MMHG | BODY MASS INDEX: 30.18 KG/M2

## 2023-10-12 DIAGNOSIS — I25.119 ATHEROSCLEROSIS OF NATIVE CORONARY ARTERY OF NATIVE HEART WITH ANGINA PECTORIS: ICD-10-CM

## 2023-10-12 DIAGNOSIS — E11.69 MIXED DIABETIC HYPERLIPIDEMIA ASSOCIATED WITH TYPE 2 DIABETES MELLITUS: Chronic | ICD-10-CM

## 2023-10-12 DIAGNOSIS — I25.10 CORONARY ARTERY DISEASE INVOLVING NATIVE CORONARY ARTERY OF NATIVE HEART WITHOUT ANGINA PECTORIS: ICD-10-CM

## 2023-10-12 DIAGNOSIS — E11.22 HYPERTENSION ASSOCIATED WITH STAGE 3 CHRONIC KIDNEY DISEASE DUE TO TYPE 2 DIABETES MELLITUS: Chronic | ICD-10-CM

## 2023-10-12 DIAGNOSIS — N25.81 SECONDARY HYPERPARATHYROIDISM OF RENAL ORIGIN: Chronic | ICD-10-CM

## 2023-10-12 DIAGNOSIS — Z00.00 ENCOUNTER FOR PREVENTIVE HEALTH EXAMINATION: Primary | ICD-10-CM

## 2023-10-12 DIAGNOSIS — E78.2 MIXED HYPERLIPIDEMIA: ICD-10-CM

## 2023-10-12 DIAGNOSIS — D84.9 IMMUNOSUPPRESSION: Chronic | ICD-10-CM

## 2023-10-12 DIAGNOSIS — Z94.0 KIDNEY TRANSPLANT RECIPIENT: Chronic | ICD-10-CM

## 2023-10-12 DIAGNOSIS — Z79.4 CONTROLLED TYPE 2 DIABETES MELLITUS WITH COMPLICATION, WITH LONG-TERM CURRENT USE OF INSULIN: Chronic | ICD-10-CM

## 2023-10-12 DIAGNOSIS — E11.8 CONTROLLED TYPE 2 DIABETES MELLITUS WITH COMPLICATION, WITH LONG-TERM CURRENT USE OF INSULIN: Chronic | ICD-10-CM

## 2023-10-12 DIAGNOSIS — E78.2 MIXED DIABETIC HYPERLIPIDEMIA ASSOCIATED WITH TYPE 2 DIABETES MELLITUS: Chronic | ICD-10-CM

## 2023-10-12 DIAGNOSIS — F13.20 BENZODIAZEPINE DEPENDENCE, CONTINUOUS: Chronic | ICD-10-CM

## 2023-10-12 DIAGNOSIS — T82.9XXS COMPLICATION OF ARTERIOVENOUS DIALYSIS FISTULA, SEQUELA: Primary | ICD-10-CM

## 2023-10-12 DIAGNOSIS — E03.9 ACQUIRED HYPOTHYROIDISM: Chronic | ICD-10-CM

## 2023-10-12 DIAGNOSIS — I35.1 AORTIC VALVE INSUFFICIENCY, ETIOLOGY OF CARDIAC VALVE DISEASE UNSPECIFIED: Chronic | ICD-10-CM

## 2023-10-12 DIAGNOSIS — I70.0 AORTIC ATHEROSCLEROSIS: ICD-10-CM

## 2023-10-12 DIAGNOSIS — E11.22 TYPE 2 DIABETES MELLITUS WITH STAGE 3A CHRONIC KIDNEY DISEASE, WITH LONG-TERM CURRENT USE OF INSULIN: ICD-10-CM

## 2023-10-12 DIAGNOSIS — M47.896 OTHER SPONDYLOSIS, LUMBAR REGION: Chronic | ICD-10-CM

## 2023-10-12 DIAGNOSIS — Z79.4 TYPE 2 DIABETES MELLITUS WITH STAGE 3A CHRONIC KIDNEY DISEASE, WITH LONG-TERM CURRENT USE OF INSULIN: ICD-10-CM

## 2023-10-12 DIAGNOSIS — F33.0 MDD (MAJOR DEPRESSIVE DISORDER), RECURRENT EPISODE, MILD: Chronic | ICD-10-CM

## 2023-10-12 DIAGNOSIS — N18.30 HYPERTENSION ASSOCIATED WITH STAGE 3 CHRONIC KIDNEY DISEASE DUE TO TYPE 2 DIABETES MELLITUS: Chronic | ICD-10-CM

## 2023-10-12 DIAGNOSIS — N18.31 TYPE 2 DIABETES MELLITUS WITH STAGE 3A CHRONIC KIDNEY DISEASE, WITH LONG-TERM CURRENT USE OF INSULIN: ICD-10-CM

## 2023-10-12 DIAGNOSIS — N18.31 STAGE 3A CHRONIC KIDNEY DISEASE: Chronic | ICD-10-CM

## 2023-10-12 DIAGNOSIS — I12.9 HYPERTENSION ASSOCIATED WITH STAGE 3 CHRONIC KIDNEY DISEASE DUE TO TYPE 2 DIABETES MELLITUS: Chronic | ICD-10-CM

## 2023-10-12 DIAGNOSIS — I15.0 RENOVASCULAR HYPERTENSION: ICD-10-CM

## 2023-10-12 PROBLEM — E83.52 HYPERCALCEMIA: Status: RESOLVED | Noted: 2018-04-02 | Resolved: 2023-10-12

## 2023-10-12 PROBLEM — E83.42 HYPOMAGNESEMIA: Chronic | Status: RESOLVED | Noted: 2019-02-12 | Resolved: 2023-10-12

## 2023-10-12 PROBLEM — R90.82 WHITE MATTER DISEASE: Status: ACTIVE | Noted: 2023-10-12

## 2023-10-12 PROBLEM — F32.A DEPRESSION: Status: RESOLVED | Noted: 2023-04-12 | Resolved: 2023-10-12

## 2023-10-12 PROCEDURE — 3074F SYST BP LT 130 MM HG: CPT | Mod: CPTII,S$GLB,, | Performed by: FAMILY MEDICINE

## 2023-10-12 PROCEDURE — 3074F SYST BP LT 130 MM HG: CPT | Mod: CPTII,S$GLB,, | Performed by: NURSE PRACTITIONER

## 2023-10-12 PROCEDURE — 1123F ACP DISCUSS/DSCN MKR DOCD: CPT | Mod: CPTII,S$GLB,, | Performed by: FAMILY MEDICINE

## 2023-10-12 PROCEDURE — G0402 INITIAL PREVENTIVE EXAM: HCPCS | Mod: S$GLB,,, | Performed by: NURSE PRACTITIONER

## 2023-10-12 PROCEDURE — 99999 PR PBB SHADOW E&M-EST. PATIENT-LVL V: ICD-10-PCS | Mod: PBBFAC,,, | Performed by: FAMILY MEDICINE

## 2023-10-12 PROCEDURE — 99999 PR PBB SHADOW E&M-EST. PATIENT-LVL V: CPT | Mod: PBBFAC,,, | Performed by: NURSE PRACTITIONER

## 2023-10-12 PROCEDURE — 4010F PR ACE/ARB THEARPY RXD/TAKEN: ICD-10-PCS | Mod: CPTII,S$GLB,, | Performed by: FAMILY MEDICINE

## 2023-10-12 PROCEDURE — 1159F PR MEDICATION LIST DOCUMENTED IN MEDICAL RECORD: ICD-10-PCS | Mod: CPTII,S$GLB,, | Performed by: NURSE PRACTITIONER

## 2023-10-12 PROCEDURE — 4010F ACE/ARB THERAPY RXD/TAKEN: CPT | Mod: CPTII,S$GLB,, | Performed by: FAMILY MEDICINE

## 2023-10-12 PROCEDURE — 3044F HG A1C LEVEL LT 7.0%: CPT | Mod: CPTII,S$GLB,, | Performed by: NURSE PRACTITIONER

## 2023-10-12 PROCEDURE — 3066F NEPHROPATHY DOC TX: CPT | Mod: CPTII,S$GLB,, | Performed by: FAMILY MEDICINE

## 2023-10-12 PROCEDURE — 3066F NEPHROPATHY DOC TX: CPT | Mod: CPTII,S$GLB,, | Performed by: NURSE PRACTITIONER

## 2023-10-12 PROCEDURE — 3066F PR DOCUMENTATION OF TREATMENT FOR NEPHROPATHY: ICD-10-PCS | Mod: CPTII,S$GLB,, | Performed by: NURSE PRACTITIONER

## 2023-10-12 PROCEDURE — 90694 VACC AIIV4 NO PRSRV 0.5ML IM: CPT | Mod: S$GLB,,, | Performed by: FAMILY MEDICINE

## 2023-10-12 PROCEDURE — 1159F MED LIST DOCD IN RCRD: CPT | Mod: CPTII,S$GLB,, | Performed by: NURSE PRACTITIONER

## 2023-10-12 PROCEDURE — 3288F PR FALLS RISK ASSESSMENT DOCUMENTED: ICD-10-PCS | Mod: CPTII,S$GLB,, | Performed by: FAMILY MEDICINE

## 2023-10-12 PROCEDURE — 99999 PR PBB SHADOW E&M-EST. PATIENT-LVL V: ICD-10-PCS | Mod: PBBFAC,,, | Performed by: NURSE PRACTITIONER

## 2023-10-12 PROCEDURE — 3074F PR MOST RECENT SYSTOLIC BLOOD PRESSURE < 130 MM HG: ICD-10-PCS | Mod: CPTII,S$GLB,, | Performed by: FAMILY MEDICINE

## 2023-10-12 PROCEDURE — 4010F ACE/ARB THERAPY RXD/TAKEN: CPT | Mod: CPTII,S$GLB,, | Performed by: NURSE PRACTITIONER

## 2023-10-12 PROCEDURE — 3061F PR NEG MICROALBUMINURIA RESULT DOCUMENTED/REVIEW: ICD-10-PCS | Mod: CPTII,S$GLB,, | Performed by: NURSE PRACTITIONER

## 2023-10-12 PROCEDURE — 4010F PR ACE/ARB THEARPY RXD/TAKEN: ICD-10-PCS | Mod: CPTII,S$GLB,, | Performed by: NURSE PRACTITIONER

## 2023-10-12 PROCEDURE — G0402 PR WELCOME MEDICARE PREVENTIVE VISIT NEW ENROLLEE: ICD-10-PCS | Mod: S$GLB,,, | Performed by: NURSE PRACTITIONER

## 2023-10-12 PROCEDURE — 3288F FALL RISK ASSESSMENT DOCD: CPT | Mod: CPTII,S$GLB,, | Performed by: NURSE PRACTITIONER

## 2023-10-12 PROCEDURE — 99999 PR PBB SHADOW E&M-EST. PATIENT-LVL V: CPT | Mod: PBBFAC,,, | Performed by: FAMILY MEDICINE

## 2023-10-12 PROCEDURE — 3288F FALL RISK ASSESSMENT DOCD: CPT | Mod: CPTII,S$GLB,, | Performed by: FAMILY MEDICINE

## 2023-10-12 PROCEDURE — G0008 ADMIN INFLUENZA VIRUS VAC: HCPCS | Mod: S$GLB,,, | Performed by: FAMILY MEDICINE

## 2023-10-12 PROCEDURE — 1101F PT FALLS ASSESS-DOCD LE1/YR: CPT | Mod: CPTII,S$GLB,, | Performed by: NURSE PRACTITIONER

## 2023-10-12 PROCEDURE — 3008F BODY MASS INDEX DOCD: CPT | Mod: CPTII,S$GLB,, | Performed by: FAMILY MEDICINE

## 2023-10-12 PROCEDURE — 3044F PR MOST RECENT HEMOGLOBIN A1C LEVEL <7.0%: ICD-10-PCS | Mod: CPTII,S$GLB,, | Performed by: FAMILY MEDICINE

## 2023-10-12 PROCEDURE — 1159F PR MEDICATION LIST DOCUMENTED IN MEDICAL RECORD: ICD-10-PCS | Mod: CPTII,S$GLB,, | Performed by: FAMILY MEDICINE

## 2023-10-12 PROCEDURE — 1160F RVW MEDS BY RX/DR IN RCRD: CPT | Mod: CPTII,S$GLB,, | Performed by: FAMILY MEDICINE

## 2023-10-12 PROCEDURE — 3078F DIAST BP <80 MM HG: CPT | Mod: CPTII,S$GLB,, | Performed by: NURSE PRACTITIONER

## 2023-10-12 PROCEDURE — 99214 OFFICE O/P EST MOD 30 MIN: CPT | Mod: S$GLB,,, | Performed by: FAMILY MEDICINE

## 2023-10-12 PROCEDURE — 3061F PR NEG MICROALBUMINURIA RESULT DOCUMENTED/REVIEW: ICD-10-PCS | Mod: CPTII,S$GLB,, | Performed by: FAMILY MEDICINE

## 2023-10-12 PROCEDURE — 3078F DIAST BP <80 MM HG: CPT | Mod: CPTII,S$GLB,, | Performed by: FAMILY MEDICINE

## 2023-10-12 PROCEDURE — 3066F PR DOCUMENTATION OF TREATMENT FOR NEPHROPATHY: ICD-10-PCS | Mod: CPTII,S$GLB,, | Performed by: FAMILY MEDICINE

## 2023-10-12 PROCEDURE — 3078F PR MOST RECENT DIASTOLIC BLOOD PRESSURE < 80 MM HG: ICD-10-PCS | Mod: CPTII,S$GLB,, | Performed by: FAMILY MEDICINE

## 2023-10-12 PROCEDURE — 90694 FLU VACCINE - QUADRIVALENT - ADJUVANTED: ICD-10-PCS | Mod: S$GLB,,, | Performed by: FAMILY MEDICINE

## 2023-10-12 PROCEDURE — 1101F PT FALLS ASSESS-DOCD LE1/YR: CPT | Mod: CPTII,S$GLB,, | Performed by: FAMILY MEDICINE

## 2023-10-12 PROCEDURE — 3008F PR BODY MASS INDEX (BMI) DOCUMENTED: ICD-10-PCS | Mod: CPTII,S$GLB,, | Performed by: FAMILY MEDICINE

## 2023-10-12 PROCEDURE — 3078F PR MOST RECENT DIASTOLIC BLOOD PRESSURE < 80 MM HG: ICD-10-PCS | Mod: CPTII,S$GLB,, | Performed by: NURSE PRACTITIONER

## 2023-10-12 PROCEDURE — 3288F PR FALLS RISK ASSESSMENT DOCUMENTED: ICD-10-PCS | Mod: CPTII,S$GLB,, | Performed by: NURSE PRACTITIONER

## 2023-10-12 PROCEDURE — 3061F NEG MICROALBUMINURIA REV: CPT | Mod: CPTII,S$GLB,, | Performed by: NURSE PRACTITIONER

## 2023-10-12 PROCEDURE — 1160F PR REVIEW ALL MEDS BY PRESCRIBER/CLIN PHARMACIST DOCUMENTED: ICD-10-PCS | Mod: CPTII,S$GLB,, | Performed by: FAMILY MEDICINE

## 2023-10-12 PROCEDURE — 1101F PR PT FALLS ASSESS DOC 0-1 FALLS W/OUT INJ PAST YR: ICD-10-PCS | Mod: CPTII,S$GLB,, | Performed by: NURSE PRACTITIONER

## 2023-10-12 PROCEDURE — 1160F RVW MEDS BY RX/DR IN RCRD: CPT | Mod: CPTII,S$GLB,, | Performed by: NURSE PRACTITIONER

## 2023-10-12 PROCEDURE — 1123F ACP DISCUSS/DSCN MKR DOCD: CPT | Mod: CPTII,S$GLB,, | Performed by: NURSE PRACTITIONER

## 2023-10-12 PROCEDURE — 1123F PR ADV CARE PLAN DISCUSSED, PLAN OR SURROGATE DOCUMENTED: ICD-10-PCS | Mod: CPTII,S$GLB,, | Performed by: FAMILY MEDICINE

## 2023-10-12 PROCEDURE — 1123F PR ADV CARE PLAN DISCUSSED, PLAN OR SURROGATE DOCUMENTED: ICD-10-PCS | Mod: CPTII,S$GLB,, | Performed by: NURSE PRACTITIONER

## 2023-10-12 PROCEDURE — G0008 FLU VACCINE - QUADRIVALENT - ADJUVANTED: ICD-10-PCS | Mod: S$GLB,,, | Performed by: FAMILY MEDICINE

## 2023-10-12 PROCEDURE — 1160F PR REVIEW ALL MEDS BY PRESCRIBER/CLIN PHARMACIST DOCUMENTED: ICD-10-PCS | Mod: CPTII,S$GLB,, | Performed by: NURSE PRACTITIONER

## 2023-10-12 PROCEDURE — 99214 PR OFFICE/OUTPT VISIT, EST, LEVL IV, 30-39 MIN: ICD-10-PCS | Mod: S$GLB,,, | Performed by: FAMILY MEDICINE

## 2023-10-12 PROCEDURE — 3061F NEG MICROALBUMINURIA REV: CPT | Mod: CPTII,S$GLB,, | Performed by: FAMILY MEDICINE

## 2023-10-12 PROCEDURE — 1159F MED LIST DOCD IN RCRD: CPT | Mod: CPTII,S$GLB,, | Performed by: FAMILY MEDICINE

## 2023-10-12 PROCEDURE — 3044F PR MOST RECENT HEMOGLOBIN A1C LEVEL <7.0%: ICD-10-PCS | Mod: CPTII,S$GLB,, | Performed by: NURSE PRACTITIONER

## 2023-10-12 PROCEDURE — 3074F PR MOST RECENT SYSTOLIC BLOOD PRESSURE < 130 MM HG: ICD-10-PCS | Mod: CPTII,S$GLB,, | Performed by: NURSE PRACTITIONER

## 2023-10-12 PROCEDURE — 1101F PR PT FALLS ASSESS DOC 0-1 FALLS W/OUT INJ PAST YR: ICD-10-PCS | Mod: CPTII,S$GLB,, | Performed by: FAMILY MEDICINE

## 2023-10-12 PROCEDURE — 3044F HG A1C LEVEL LT 7.0%: CPT | Mod: CPTII,S$GLB,, | Performed by: FAMILY MEDICINE

## 2023-10-12 RX ORDER — INSULIN GLARGINE 100 [IU]/ML
INJECTION, SOLUTION SUBCUTANEOUS
COMMUNITY
End: 2023-11-01

## 2023-10-12 NOTE — PATIENT INSTRUCTIONS
Counseling and Referral of Other Preventative  (Italic type indicates deductible and co-insurance are waived)    Patient Name: Andra Newell  Today's Date: 10/12/2023    Health Maintenance       Date Due Completion Date    Shingles Vaccine (1 of 2) Never done ---    High Dose Statin Never done ---    Eye Exam 11/18/2021 11/18/2020    Mammogram 08/05/2022 8/5/2021    Influenza Vaccine (1) 09/01/2023 12/12/2022    Override on 10/6/2020: Done (completed at [facility])    COVID-19 Vaccine (3 - 2023-24 season) 09/01/2023 7/14/2021    Hemoglobin A1c 12/30/2023 6/30/2023    Diabetes Urine Screening 01/04/2024 1/4/2023    Foot Exam 04/17/2024 4/17/2023    Override on 10/2/2019: Done    Lipid Panel 04/26/2024 4/26/2023    Pneumococcal Vaccines (Age 65+) (3 - PPSV23 or PCV20) 04/30/2024 4/30/2019    Colorectal Cancer Screening 05/09/2024 5/9/2019    Aspirin/Antiplatelet Therapy 08/21/2024 8/21/2023    DEXA Scan 09/12/2026 9/12/2023    TETANUS VACCINE 07/12/2027 7/12/2017        No orders of the defined types were placed in this encounter.    The following information is provided to all patients.  This information is to help you find resources for any of the problems found today that may be affecting your health:                Living healthy guide: www.[facility].[place].gov      Understanding Diabetes: www.diabetes.org      Eating healthy: www.cdc.gov/healthyweight      CDC home safety checklist: www.cdc.gov/steadi/patient.html      Agency on Aging: www.goea.[place].gov      Alcoholics anonymous (AA): www.aa.org      Physical Activity: www.abigail.nih.gov/ux9zumq      Tobacco use: www.quitwithusla.org

## 2023-10-12 NOTE — PROGRESS NOTES
"  Andra Newell presented for a  Medicare AWV and comprehensive Health Risk Assessment today. The following components were reviewed and updated:    Medical history  Family History  Social history  Allergies and Current Medications  Health Risk Assessment  Health Maintenance  Care Team     ** See Completed Assessments for Annual Wellness Visit within the encounter summary.**     The following assessments were completed:  Living Situation  CAGE  Depression Screening  Timed Get Up and Go  Whisper Test  Cognitive Function Screening      Nutrition Screening  ADL Screening  PAQ Screening    Review for Opioid Screening: Pt does not have Rx for Opioids  Review for Substance Use Disorders: Patient does not use substance      Vitals:    10/12/23 1033   BP: 126/72   BP Location: Left arm   Patient Position: Sitting   BP Method: Medium (Manual)   Pulse: 71   SpO2: 97%   Weight: 85.2 kg (187 lb 13.3 oz)   Height: 5' 6" (1.676 m)     Body mass index is 30.32 kg/m².  Physical Exam  Vitals reviewed.   Constitutional:       General: She is not in acute distress.  Pulmonary:      Effort: Pulmonary effort is normal. No respiratory distress.   Skin:     General: Skin is warm and dry.   Neurological:      Mental Status: She is alert and oriented to person, place, and time.   Psychiatric:         Mood and Affect: Mood normal.         Behavior: Behavior normal.         Thought Content: Thought content normal.         Judgment: Judgment normal.     Diagnoses and health risks identified today and associated recommendations/orders:    1. Encounter for preventive health examination  Reviewed and discussed health maintenance.      2. MDD (major depressive disorder), recurrent episode, mild  Stable- continue current treatment and follow up routinely with PCP and psychiatry    3. Benzodiazepine dependence, continuous  Stable- continue current treatment and follow up routinely with PCP and psychiatry    4. Aortic atherosclerosis  Stable- " continue current treatment and follow up routinely with PCP and cardiology ()    5. Atherosclerosis of native coronary artery of native heart with angina pectoris  Stable- continue current treatment and follow up routinely with PCP and cardiology ()    6. Coronary artery disease involving native coronary artery of native heart without angina pectoris  Stable- continue current treatment and follow up routinely with PCP and cardiology ()    7. Mixed diabetic hyperlipidemia associated with type 2 diabetes mellitus  Stable- continue current treatment and follow up routinely with PCP and cardiology ()    8. Renovascular hypertension  Stable- continue current treatment and follow up routinely with PCP and cardiology () and nephrology ()    9. Mixed hyperlipidemia  Stable- continue current treatment and follow up routinely with PCP and cardiology ()    10. Aortic valve insufficiency, etiology of cardiac valve disease unspecified  Stable- continue current treatment and follow up routinely with PCP and cardiology ()    11. Stage 3a chronic kidney disease  Stable- continue current treatment and follow up routinely with PCP and nephrology () and transplant team    12. Kidney transplant recipient  Stable- continue current treatment and follow up routinely with PCP and nephrology () and transplant team    13. Immunosuppression  Stable- continue current treatment and follow up routinely with PCP and nephrology () and transplant team    14. Type 2 diabetes mellitus with stage 3a chronic kidney disease, with long-term current use of insulin  Stable- continue current treatment and follow up routinely with PCP and nephrology () and transplant team and endocrinology (L.Akbar)    15. Controlled type 2 diabetes mellitus with complication, with long-term current use of insulin, HbA1c 6.7  percent  Stable- continue current treatment and follow up routinely with PCP and nephrology () and transplant team and endocrinology (L.Akbar)    16. Hypothyroidism  Stable- continue current treatment and follow up routinely with PCP     17. Secondary hyperparathyroidism of renal origin  Stable- continue current treatment and follow up routinely with PCP and nephrology () and transplant team and endocrinology (Daryl)    Provided Andra with a 5-10 year written screening schedule and personal prevention plan. Recommendations were developed using the USPSTF age appropriate recommendations. Education, counseling, and referrals were provided as needed. After Visit Summary printed and given to patient which includes a list of additional screenings\tests needed.    I offered to discuss end of life issues, including information on how to make advance directives that the patient could use to name someone who would make medical decisions on their behalf if they became too ill to make themselves.  _X_Patient is interested, I offered to discuss.  Kaila Segovia, FIDENCIO

## 2023-10-12 NOTE — PROGRESS NOTES
Primary Care Provider Appointment   [facility]       Patient ID: Andra Newell is a 63 y.o. female.    ASSESSMENT/PLAN by Problem List:    1. Complication of arteriovenous dialysis fistula, sequela  Assessment & Plan:   Discussion: There does appear to be some enlargement possibly aneurysmal changes to the distal and proximal portions of the fistula.    Referral for vascular surgery entered  .    Orders:  -     Ambulatory referral/consult to Vascular Surgery; Future; Expected date: 10/19/2023    2. Essential hypertension   Assessment & Plan:  Stable and satisfactory      3. Controlled type 2 diabetes mellitus with complication, with long-term current use of insulin, HbA1c 6.7 percent  Assessment & Plan:  Diabetes has improved, last A1c was 6.7%.  She will continue current medications along with healthy nutrition and light activity.  She continues to follow with Endocrinology.      4. Other spondylosis, lumbar region  Assessment & Plan:  She has chronic low back pain and degenerative changes.  She currently has a pain pump with fentanyl.  Her current pain physician apparently is changing his practice and will no longer be able to refill her pain pump and she states she is left undecided on how to proceed.  As of now she is considering having removal of the pump.  Although that will obviously affect her long-term pain control.  She is contemplating on whether to proceed with this and stay at her current pain physician's office under the care of a new provider there or change locations completely to a provider who can continue to refill her pain pump.      Other orders  -     Influenza - Quadrivalent (Adjuvanted)        Follow Up:  Two months    Subjective:     Chief Complaint   Patient presents with    Follow-up    pain pump removal      Pt states she wants to discuss the pain pump removal.     I have reviewed the information entered by the ancillary staff regarding the  "chief complaint as well as the related history.    HPI    Patient is a/an 63 y.o.  female       Pain pump, Dr. Nunez will no longer manage  Planning on having this removed though still considering other options.      She does feel that her AV fistula has enlarged in size.    Diabetes has been good.    For complete problem list, past medical history, surgical history, social history, etc., see appropriate section in the electronic medical record    Review of Systems   Respiratory: Negative.     Cardiovascular: Negative.    Musculoskeletal:  Positive for arthralgias and back pain.       Objective     Physical Exam  HENT:      Head: Normocephalic and atraumatic.   Eyes:      General: No scleral icterus.     Conjunctiva/sclera: Conjunctivae normal.   Cardiovascular:      Rate and Rhythm: Normal rate and regular rhythm.      Heart sounds: Murmur heard.      Systolic murmur is present with a grade of 2/6.      No friction rub.      Comments: There is no peripheral edema.  Pulmonary:      Effort: Pulmonary effort is normal. No respiratory distress.      Breath sounds: Normal breath sounds. No wheezing or rales.   Musculoskeletal:        Arms:    Psychiatric:         Mood and Affect: Mood normal.       Vitals:    10/12/23 1519   BP: 126/68   BP Location: Left arm   Patient Position: Sitting   BP Method: Large (Manual)   Pulse: 72   Resp: 18   Temp: 98.6 °F (37 °C)   TempSrc: Oral   SpO2: 97%   Weight: 84.9 kg (187 lb 2.7 oz)   Height: 5' 6" (1.676 m)           THIS DOCUMENT WAS MADE IN PART WITH VOICE RECOGNITION SOFTWARE.  OCCASIONALLY THIS SOFTWARE WILL MISINTERPRET WORDS OR PHRASES.    "

## 2023-10-16 RX ORDER — CLOPIDOGREL BISULFATE 75 MG/1
TABLET ORAL
Qty: 90 TABLET | Refills: 0 | Status: SHIPPED | OUTPATIENT
Start: 2023-10-16

## 2023-10-17 ENCOUNTER — TELEPHONE (OUTPATIENT)
Dept: VASCULAR SURGERY | Facility: CLINIC | Age: 63
End: 2023-10-17
Payer: MEDICARE

## 2023-10-17 ENCOUNTER — LAB VISIT (OUTPATIENT)
Dept: LAB | Facility: HOSPITAL | Age: 63
End: 2023-10-17
Attending: INTERNAL MEDICINE
Payer: MEDICARE

## 2023-10-17 DIAGNOSIS — Z99.2 ESRD (END STAGE RENAL DISEASE) ON DIALYSIS: Primary | ICD-10-CM

## 2023-10-17 DIAGNOSIS — Z01.818 PRE-OP EVALUATION: Primary | ICD-10-CM

## 2023-10-17 DIAGNOSIS — N18.6 ESRD (END STAGE RENAL DISEASE) ON DIALYSIS: Primary | ICD-10-CM

## 2023-10-17 DIAGNOSIS — Z94.0 KIDNEY REPLACED BY TRANSPLANT: ICD-10-CM

## 2023-10-17 LAB
ALBUMIN SERPL BCP-MCNC: 4 G/DL (ref 3.5–5.2)
ANION GAP SERPL CALC-SCNC: 9 MMOL/L (ref 8–16)
BACTERIA #/AREA URNS HPF: ABNORMAL /HPF
BILIRUB UR QL STRIP: NEGATIVE
BUN SERPL-MCNC: 16 MG/DL (ref 8–23)
CALCIUM SERPL-MCNC: 10.4 MG/DL (ref 8.7–10.5)
CHLORIDE SERPL-SCNC: 104 MMOL/L (ref 95–110)
CLARITY UR: CLEAR
CO2 SERPL-SCNC: 27 MMOL/L (ref 23–29)
COLOR UR: YELLOW
CREAT SERPL-MCNC: 1.1 MG/DL (ref 0.5–1.4)
CREAT UR-MCNC: 142 MG/DL (ref 15–325)
EST. GFR  (NO RACE VARIABLE): 55.8 ML/MIN/1.73 M^2
GLUCOSE SERPL-MCNC: 166 MG/DL (ref 70–110)
GLUCOSE UR QL STRIP: NEGATIVE
HGB UR QL STRIP: NEGATIVE
KETONES UR QL STRIP: NEGATIVE
LEUKOCYTE ESTERASE UR QL STRIP: ABNORMAL
MICROSCOPIC COMMENT: ABNORMAL
NITRITE UR QL STRIP: NEGATIVE
PH UR STRIP: 7 [PH] (ref 5–8)
PHOSPHATE SERPL-MCNC: 4.1 MG/DL (ref 2.7–4.5)
POTASSIUM SERPL-SCNC: 4.2 MMOL/L (ref 3.5–5.1)
PROT UR QL STRIP: NEGATIVE
PROT UR-MCNC: 14 MG/DL (ref 0–15)
PROT/CREAT UR: 0.1 MG/G{CREAT} (ref 0–0.2)
PTH-INTACT SERPL-MCNC: 44 PG/ML (ref 9–77)
RBC #/AREA URNS HPF: 1 /HPF (ref 0–4)
SODIUM SERPL-SCNC: 140 MMOL/L (ref 136–145)
SP GR UR STRIP: 1.02 (ref 1–1.03)
SQUAMOUS #/AREA URNS HPF: 1 /HPF
URN SPEC COLLECT METH UR: ABNORMAL
WBC #/AREA URNS HPF: 8 /HPF (ref 0–5)

## 2023-10-17 PROCEDURE — 82570 ASSAY OF URINE CREATININE: CPT | Performed by: INTERNAL MEDICINE

## 2023-10-17 PROCEDURE — 83970 ASSAY OF PARATHORMONE: CPT | Performed by: INTERNAL MEDICINE

## 2023-10-17 PROCEDURE — 80197 ASSAY OF TACROLIMUS: CPT | Performed by: INTERNAL MEDICINE

## 2023-10-17 PROCEDURE — 36415 COLL VENOUS BLD VENIPUNCTURE: CPT | Mod: PO | Performed by: INTERNAL MEDICINE

## 2023-10-17 PROCEDURE — 80069 RENAL FUNCTION PANEL: CPT | Performed by: INTERNAL MEDICINE

## 2023-10-17 PROCEDURE — 81000 URINALYSIS NONAUTO W/SCOPE: CPT | Mod: PO | Performed by: INTERNAL MEDICINE

## 2023-10-17 NOTE — TELEPHONE ENCOUNTER
Contacted pt in reference to upcoming appt with Dr. Villaseñor. Pt states she LUE AVF that is enlarging and her nephrologist would like for her to have this removed. States she received a kidney transplant and has not dialyzed in five years. Rescheduled appt with Dr. Villaseñor to allow for CFHA, pt confirmed. Appt letter placed in mail.----- Message from YUVAL Villaseñor III, MD sent at 10/17/2023 11:47 AM CDT -----  Needs arm vein mapping before 10/20 consult

## 2023-10-18 PROBLEM — T82.9XXA COMPLICATION OF ARTERIOVENOUS DIALYSIS FISTULA: Status: ACTIVE | Noted: 2023-10-18

## 2023-10-18 LAB — TACROLIMUS BLD-MCNC: 4.2 NG/ML (ref 5–15)

## 2023-10-18 NOTE — ASSESSMENT & PLAN NOTE
Discussion: There does appear to be some enlargement possibly aneurysmal changes to the distal and proximal portions of the fistula.    Referral for vascular surgery entered  .

## 2023-10-18 NOTE — ASSESSMENT & PLAN NOTE
Diabetes has improved, last A1c was 6.7%.  She will continue current medications along with healthy nutrition and light activity.  She continues to follow with Endocrinology.

## 2023-10-18 NOTE — ASSESSMENT & PLAN NOTE
She has chronic low back pain and degenerative changes.  She currently has a pain pump with fentanyl.  Her current pain physician apparently is changing his practice and will no longer be able to refill her pain pump and she states she is left undecided on how to proceed.  As of now she is considering having removal of the pump.  Although that will obviously affect her long-term pain control.  She is contemplating on whether to proceed with this and stay at her current pain physician's office under the care of a new provider there or change locations completely to a provider who can continue to refill her pain pump.

## 2023-10-20 ENCOUNTER — TELEPHONE (OUTPATIENT)
Dept: NEPHROLOGY | Facility: CLINIC | Age: 63
End: 2023-10-20
Payer: MEDICARE

## 2023-10-20 NOTE — TELEPHONE ENCOUNTER
Patient + leukocytes not s/s at this time, but did have symptoms last week.    Do you feel she needs ab tx?      Pharmacy [facility] [address]

## 2023-10-21 ENCOUNTER — PATIENT MESSAGE (OUTPATIENT)
Dept: NEPHROLOGY | Facility: CLINIC | Age: 63
End: 2023-10-21
Payer: MEDICARE

## 2023-10-25 ENCOUNTER — OFFICE VISIT (OUTPATIENT)
Dept: NEPHROLOGY | Facility: CLINIC | Age: 63
End: 2023-10-25
Payer: MEDICARE

## 2023-10-25 VITALS
HEART RATE: 71 BPM | HEIGHT: 66 IN | DIASTOLIC BLOOD PRESSURE: 60 MMHG | SYSTOLIC BLOOD PRESSURE: 124 MMHG | BODY MASS INDEX: 30.05 KG/M2 | OXYGEN SATURATION: 97 % | WEIGHT: 187 LBS

## 2023-10-25 DIAGNOSIS — Z94.0 KIDNEY REPLACED BY TRANSPLANT: Primary | ICD-10-CM

## 2023-10-25 PROCEDURE — 3074F SYST BP LT 130 MM HG: CPT | Mod: CPTII,S$GLB,, | Performed by: INTERNAL MEDICINE

## 2023-10-25 PROCEDURE — 3074F PR MOST RECENT SYSTOLIC BLOOD PRESSURE < 130 MM HG: ICD-10-PCS | Mod: CPTII,S$GLB,, | Performed by: INTERNAL MEDICINE

## 2023-10-25 PROCEDURE — 3008F PR BODY MASS INDEX (BMI) DOCUMENTED: ICD-10-PCS | Mod: CPTII,S$GLB,, | Performed by: INTERNAL MEDICINE

## 2023-10-25 PROCEDURE — 3078F DIAST BP <80 MM HG: CPT | Mod: CPTII,S$GLB,, | Performed by: INTERNAL MEDICINE

## 2023-10-25 PROCEDURE — 3044F HG A1C LEVEL LT 7.0%: CPT | Mod: CPTII,S$GLB,, | Performed by: INTERNAL MEDICINE

## 2023-10-25 PROCEDURE — 1157F ADVNC CARE PLAN IN RCRD: CPT | Mod: CPTII,S$GLB,, | Performed by: INTERNAL MEDICINE

## 2023-10-25 PROCEDURE — 4010F PR ACE/ARB THEARPY RXD/TAKEN: ICD-10-PCS | Mod: CPTII,S$GLB,, | Performed by: INTERNAL MEDICINE

## 2023-10-25 PROCEDURE — 3066F NEPHROPATHY DOC TX: CPT | Mod: CPTII,S$GLB,, | Performed by: INTERNAL MEDICINE

## 2023-10-25 PROCEDURE — 1159F PR MEDICATION LIST DOCUMENTED IN MEDICAL RECORD: ICD-10-PCS | Mod: CPTII,S$GLB,, | Performed by: INTERNAL MEDICINE

## 2023-10-25 PROCEDURE — 99999 PR PBB SHADOW E&M-EST. PATIENT-LVL IV: CPT | Mod: PBBFAC,,, | Performed by: INTERNAL MEDICINE

## 2023-10-25 PROCEDURE — 3061F PR NEG MICROALBUMINURIA RESULT DOCUMENTED/REVIEW: ICD-10-PCS | Mod: CPTII,S$GLB,, | Performed by: INTERNAL MEDICINE

## 2023-10-25 PROCEDURE — 99214 PR OFFICE/OUTPT VISIT, EST, LEVL IV, 30-39 MIN: ICD-10-PCS | Mod: S$GLB,,, | Performed by: INTERNAL MEDICINE

## 2023-10-25 PROCEDURE — 3044F PR MOST RECENT HEMOGLOBIN A1C LEVEL <7.0%: ICD-10-PCS | Mod: CPTII,S$GLB,, | Performed by: INTERNAL MEDICINE

## 2023-10-25 PROCEDURE — 1159F MED LIST DOCD IN RCRD: CPT | Mod: CPTII,S$GLB,, | Performed by: INTERNAL MEDICINE

## 2023-10-25 PROCEDURE — 1160F PR REVIEW ALL MEDS BY PRESCRIBER/CLIN PHARMACIST DOCUMENTED: ICD-10-PCS | Mod: CPTII,S$GLB,, | Performed by: INTERNAL MEDICINE

## 2023-10-25 PROCEDURE — 99999 PR PBB SHADOW E&M-EST. PATIENT-LVL IV: ICD-10-PCS | Mod: PBBFAC,,, | Performed by: INTERNAL MEDICINE

## 2023-10-25 PROCEDURE — 3008F BODY MASS INDEX DOCD: CPT | Mod: CPTII,S$GLB,, | Performed by: INTERNAL MEDICINE

## 2023-10-25 PROCEDURE — 99214 OFFICE O/P EST MOD 30 MIN: CPT | Mod: S$GLB,,, | Performed by: INTERNAL MEDICINE

## 2023-10-25 PROCEDURE — 1101F PR PT FALLS ASSESS DOC 0-1 FALLS W/OUT INJ PAST YR: ICD-10-PCS | Mod: CPTII,S$GLB,, | Performed by: INTERNAL MEDICINE

## 2023-10-25 PROCEDURE — 1157F PR ADVANCE CARE PLAN OR EQUIV PRESENT IN MEDICAL RECORD: ICD-10-PCS | Mod: CPTII,S$GLB,, | Performed by: INTERNAL MEDICINE

## 2023-10-25 PROCEDURE — 1160F RVW MEDS BY RX/DR IN RCRD: CPT | Mod: CPTII,S$GLB,, | Performed by: INTERNAL MEDICINE

## 2023-10-25 PROCEDURE — 3066F PR DOCUMENTATION OF TREATMENT FOR NEPHROPATHY: ICD-10-PCS | Mod: CPTII,S$GLB,, | Performed by: INTERNAL MEDICINE

## 2023-10-25 PROCEDURE — 4010F ACE/ARB THERAPY RXD/TAKEN: CPT | Mod: CPTII,S$GLB,, | Performed by: INTERNAL MEDICINE

## 2023-10-25 PROCEDURE — 3061F NEG MICROALBUMINURIA REV: CPT | Mod: CPTII,S$GLB,, | Performed by: INTERNAL MEDICINE

## 2023-10-25 PROCEDURE — 3288F PR FALLS RISK ASSESSMENT DOCUMENTED: ICD-10-PCS | Mod: CPTII,S$GLB,, | Performed by: INTERNAL MEDICINE

## 2023-10-25 PROCEDURE — 3078F PR MOST RECENT DIASTOLIC BLOOD PRESSURE < 80 MM HG: ICD-10-PCS | Mod: CPTII,S$GLB,, | Performed by: INTERNAL MEDICINE

## 2023-10-25 PROCEDURE — 3288F FALL RISK ASSESSMENT DOCD: CPT | Mod: CPTII,S$GLB,, | Performed by: INTERNAL MEDICINE

## 2023-10-25 PROCEDURE — 1101F PT FALLS ASSESS-DOCD LE1/YR: CPT | Mod: CPTII,S$GLB,, | Performed by: INTERNAL MEDICINE

## 2023-10-25 NOTE — PROGRESS NOTES
"    Subjective:       Patient ID: Andra Newell is a 63 y.o. White female who presents for return patient evaluation for chronic renal failure.      She is s/p kidney transplant in 2019.  She is on prograf 1/1.  She states she wants her AVF taken down due to the fact it is getting larger and is sore sometimes.   She has been having nausea regularly for several months.  She thinks this started after she had her pain pump inserted.  She also had a R meniscal tear recently.      Review of Systems   Constitutional:  Positive for unexpected weight change. Negative for appetite change, chills and fever.   HENT:  Negative for congestion.    Eyes:  Negative for visual disturbance.   Respiratory:  Negative for cough and shortness of breath.    Cardiovascular:  Positive for chest pain. Negative for leg swelling.   Gastrointestinal:  Positive for nausea (chronic). Negative for abdominal pain, diarrhea and vomiting.   Genitourinary:  Negative for difficulty urinating, dysuria and hematuria.   Musculoskeletal:  Positive for arthralgias (R knee). Negative for myalgias.   Skin:  Negative for rash.   Neurological:  Positive for headaches (from NTG).   Psychiatric/Behavioral:  Positive for dysphoric mood. Negative for sleep disturbance.        The past medical, family and social histories were reviewed for this encounter.     /60 (BP Location: Right arm, Patient Position: Sitting)   Pulse 71   Ht 5' 6" (1.676 m)   Wt 84.8 kg (187 lb)   LMP 02/28/2012   SpO2 97%   BMI 30.18 kg/m²     Objective:      Physical Exam  Vitals reviewed.   Constitutional:       General: She is not in acute distress.     Appearance: She is well-developed.   HENT:      Head: Normocephalic and atraumatic.   Eyes:      General: No scleral icterus.     Conjunctiva/sclera: Conjunctivae normal.   Neck:      Vascular: No JVD.   Cardiovascular:      Rate and Rhythm: Normal rate and regular rhythm.      Heart sounds: Normal heart sounds. No murmur " heard.     No friction rub. No gallop.   Pulmonary:      Effort: Pulmonary effort is normal. No respiratory distress.      Breath sounds: Normal breath sounds. No wheezing.   Abdominal:      General: Bowel sounds are normal. There is no distension.      Palpations: Abdomen is soft.      Tenderness: There is no abdominal tenderness.   Musculoskeletal:      Cervical back: Normal range of motion.      Right lower leg: No edema.      Left lower leg: No edema.      Comments: LUE AVF with one aneurysmal area with bruit   Skin:     General: Skin is warm and dry.      Findings: No rash.   Neurological:      Mental Status: She is alert and oriented to person, place, and time.   Psychiatric:         Mood and Affect: Mood normal.         Behavior: Behavior normal.         Assessment:       1. Kidney replaced by transplant        Lab Results   Component Value Date    CREATININE 1.1 10/17/2023    BUN 16 10/17/2023     10/17/2023    K 4.2 10/17/2023     10/17/2023    CO2 27 10/17/2023     Lab Results   Component Value Date    PTH 44.0 10/17/2023    CALCIUM 10.4 10/17/2023    PHOS 4.1 10/17/2023     Lab Results   Component Value Date    HCT 36.7 (L) 08/22/2023     Prot/Creat Ratio, Urine   Date Value Ref Range Status   10/17/2023 0.10 0.00 - 0.20 Final   01/04/2023 0.08 0.00 - 0.20 Final   02/07/2022 0.14 0.00 - 0.20 Final     Plan:   Return to clinic in 4 months.  Labs for next visit include labs per standing orders.    Baseline creatinine is 0.9-1.0 since her transplant..  PG level is 4.2.  PTH is 44 with  calcium of 10.4.  UPC is negative.

## 2023-10-25 NOTE — Clinical Note
Hello.  She seems to have had several months of nausea and is losing a good deal of weight because of it.  Do you think the fentanyl pain pump is causing her nausea?

## 2023-10-30 NOTE — PROGRESS NOTES
CC: This 63 y.o. female  presents for management of diabetes  and chronic conditions pending review including HTN, HLP, ESRD s/p kidney txp 01/14/2019, hypothyroidism, vitamin d deficiency, obesity      HPI: She  was diagnosed with T2DM in 2005. Has never been hospitalized r/t DM.  Family hx of DM: grandmother    Patient last seen 4/2023  Since then insulin was stopped r/t hypoglycemia  Wearing Dexcom G7 sensor- see download in Media tab;  6% Very High   37% High   57% In Range   0% Low   0% Very Low   Having pp excursions after brunch and supper  Continues to be tired, decreased appetite, sleeping often  Removing pain pump on Dec 15th     Diet: Eats 2 Meals a day, snacks-  pretzels, [place] trees   Lunch- 2 soft tacos or grilled cheese sandwich or PB crackers  D- protein, 1/2 potato , limited vegetable   Exercise: none r/t back pain  CURRENT DM MEDS: , Novolog 30 u if bg > 250 , metformin xr 500 mg bid  Timing prandial insulin 5-15 minutes before meals: yes  Vial/pen:  Uses pens    Standards of Care:  Eye exam: Dr. Ortega 4/2023    Regarding hypothyroidism   Fmh mom  Takes LT4 with all her other meds   No hoarseness, voice changes, +dysphagia- following w GI,no compressive symptoms, or head/neck exposure to XRT.   No personal or FH of thyroid cancer or MEN syndrome.   Not taking biotin.   + tremors of the hands  No intolerance to the heat or cold  No hair loss      ROS:   Gen: Appetite fair. + back pain  Eyes: Denies visual disturbances  Resp: +SOB  Cardiac: + palpitations- rarely notices when she's anxious ,  No chest pain-   GI: + nausea daily, + vomiting ~ once a week, + diarrhea and constipation   /GYN: 2+  nocturia, no burning or pain.   MS/Neuro: Denies numbness/ tingling in BLE; Gait steady, speech clear    PE:  GENERAL: Well developed, well nourished.  PSYCH: AAOx3, appropriate mood and affect, pleasant expression, conversant, appears relaxed, well groomed.   EYES: Conjunctiva, corneas  "clear  NECK: Supple, trachea midline,   NEURO: Gait steady  SKIN:   no acanthosis nigracans.  FOOT EXAMINATION: 4/17/2023  No foot deformity, corns or callus formation,  nails in good condition and well trimmed, no interspace maceration or ulceration noted.  Decreased hair growth present over toes/feet.  Protective sensation intact with 10 gram monofilament.  +2 dorsalis pedis and posterior pulses noted.     Personally reviewed Past Medical, Surgical, Social History.    /72 (BP Location: Right arm, Patient Position: Sitting, BP Method: Large (Manual))   Pulse 68   Ht 5' 6" (1.676 m)   Wt 84 kg (185 lb 3 oz)   LMP 02/28/2012   BMI 29.89 kg/m²      Personally reviewed the below labs:      Chemistry        Component Value Date/Time     10/17/2023 0854    K 4.2 10/17/2023 0854     10/17/2023 0854    CO2 27 10/17/2023 0854    BUN 16 10/17/2023 0854    CREATININE 1.1 10/17/2023 0854     (H) 10/17/2023 0854        Component Value Date/Time    CALCIUM 10.4 10/17/2023 0854    ALKPHOS 85 07/11/2023 0441    AST 53 (H) 07/11/2023 0441    ALT 57 (H) 07/11/2023 0441    BILITOT 0.8 07/11/2023 0441    ESTGFRAFRICA >60.0 06/09/2022 0954    EGFRNONAA 59.7 (A) 06/09/2022 0954            Lab Results   Component Value Date    TSH 0.859 07/08/2023       Recent Labs   Lab 04/26/23  1351   LDL Cholesterol 27.6 L   HDL 27 L   Cholesterol 81 L        Results for orders placed or performed in visit on 11/17/21   Vitamin D   Result Value Ref Range    Vit D, 25-Hydroxy 42 30 - 96 ng/mL     No results found. However, due to the size of the patient record, not all encounters were searched. Please check Results Review for a complete set of results.    Lab Results   Component Value Date    MICALBCREAT 8.1 01/04/2023       Hemoglobin A1C   Date Value Ref Range Status   06/30/2023 6.7 (H) 4.0 - 5.6 % Final     Comment:     ADA Screening Guidelines:  5.7-6.4%  Consistent with prediabetes  >or=6.5%  Consistent with " diabetes    High levels of fetal hemoglobin interfere with the HbA1C  assay. Heterozygous hemoglobin variants (HbS, HgC, etc)do  not significantly interfere with this assay.   However, presence of multiple variants may affect accuracy.     04/26/2023 7.0 (H) 4.0 - 5.6 % Final     Comment:     ADA Screening Guidelines:  5.7-6.4%  Consistent with prediabetes  >or=6.5%  Consistent with diabetes    High levels of fetal hemoglobin interfere with the HbA1C  assay. Heterozygous hemoglobin variants (HbS, HgC, etc)do  not significantly interfere with this assay.   However, presence of multiple variants may affect accuracy.     01/04/2023 7.5 (H) 4.0 - 5.6 % Final     Comment:     ADA Screening Guidelines:  5.7-6.4%  Consistent with prediabetes  >or=6.5%  Consistent with diabetes    High levels of fetal hemoglobin interfere with the HbA1C  assay. Heterozygous hemoglobin variants (HbS, HgC, etc)do  not significantly interfere with this assay.   However, presence of multiple variants may affect accuracy.          ASSESSMENT and PLAN:      1. 1. T2DM with hyperglycemia, CKD 3-    A1C to be drawn next week   Tresiba U200  to 16 u qd, Novolog 10u AC + correction 150 /25, metformin xr 500 mg bid   ONLY take Novolog 5-15 mins prior to a meal, if skipping a meal, skip Novolog  Continue Dexcom G7  Hold GLP-1 until she sees gastro as she's having so much nausea w episodes of vomiting  SGLT2 will need to be approved by [facility]/nephrology       2. HTN - controlled, continue meds as previously prescribed and monitor.      3. HLP - on statin therapy     4. S/p kidney txp- followed by [facility]     5.  Immunosuppression - agents may increase bg readings     6. Hypothyroidism - takes with all other meds, check lab next week and w RTC

## 2023-10-31 ENCOUNTER — HOSPITAL ENCOUNTER (OUTPATIENT)
Dept: VASCULAR SURGERY | Facility: CLINIC | Age: 63
Discharge: HOME | End: 2023-10-31
Attending: SURGERY
Payer: MEDICARE

## 2023-10-31 ENCOUNTER — INITIAL CONSULT (OUTPATIENT)
Dept: VASCULAR SURGERY | Facility: CLINIC | Age: 63
End: 2023-10-31
Payer: MEDICARE

## 2023-10-31 VITALS
TEMPERATURE: 99 F | SYSTOLIC BLOOD PRESSURE: 118 MMHG | HEART RATE: 74 BPM | WEIGHT: 185.19 LBS | HEIGHT: 66 IN | BODY MASS INDEX: 29.76 KG/M2 | DIASTOLIC BLOOD PRESSURE: 57 MMHG

## 2023-10-31 DIAGNOSIS — T82.9XXS COMPLICATION OF ARTERIOVENOUS DIALYSIS FISTULA, SEQUELA: ICD-10-CM

## 2023-10-31 DIAGNOSIS — N18.31 STAGE 3A CHRONIC KIDNEY DISEASE: Primary | Chronic | ICD-10-CM

## 2023-10-31 DIAGNOSIS — N18.6 ESRD (END STAGE RENAL DISEASE) ON DIALYSIS: ICD-10-CM

## 2023-10-31 DIAGNOSIS — Z99.2 ESRD (END STAGE RENAL DISEASE) ON DIALYSIS: ICD-10-CM

## 2023-10-31 PROCEDURE — 93990 PR DUPLEX HEMODIALYSIS ACCESS: ICD-10-PCS | Mod: S$GLB,,, | Performed by: SURGERY

## 2023-10-31 PROCEDURE — 99999 PR PBB SHADOW E&M-EST. PATIENT-LVL III: ICD-10-PCS | Mod: PBBFAC,,, | Performed by: SURGERY

## 2023-10-31 PROCEDURE — 99204 PR OFFICE/OUTPT VISIT, NEW, LEVL IV, 45-59 MIN: ICD-10-PCS | Mod: S$GLB,,, | Performed by: SURGERY

## 2023-10-31 PROCEDURE — 99999 PR PBB SHADOW E&M-EST. PATIENT-LVL III: CPT | Mod: PBBFAC,,, | Performed by: SURGERY

## 2023-10-31 PROCEDURE — 99204 OFFICE O/P NEW MOD 45 MIN: CPT | Mod: S$GLB,,, | Performed by: SURGERY

## 2023-10-31 PROCEDURE — 93990 DOPPLER FLOW TESTING: CPT | Mod: S$GLB,,, | Performed by: SURGERY

## 2023-10-31 NOTE — PROGRESS NOTES
VASCULAR SURGERY SERVICE    REFERRING DOCTOR: Fady Vaughn MD    CHIEF COMPLAINT:  Status post kidney transplantation    HISTORY OF PRESENT ILLNESS: Andra Newell is a 63 y.o. female who underwent left arm AV fistula creation 2018 but 6 months later received a kidney transplantation.  This transplants continued to work well with a recent creatinine of 1.1 and GFR 55.    She presents today because of some enlargement of the fistula.  This is not painful really bothering her    Past Medical History:   Diagnosis Date    Anemia     Anemia in chronic kidney disease, on chronic dialysis 7/12/2017    Anxiety and depression     Aplastic anemia with parvovirus B19 infection 5/27/2019    Aplastic anemia with parvovirus B19 infection 4/2019 5/27/2019    Arthritis     Asthma     allergic airway    CKD stage III (moderate) 2/18/2019    Colon polyp     Depression     Diabetes mellitus     Diverticulosis     Encounter for blood transfusion     Eosinophilia     ESRD (end stage renal disease)     stage V, due for living donor 9/2018    ESRD on dialysis     GERD (gastroesophageal reflux disease)     Gout     Gout     Hyperlipidemia     Hypertension     Hypertriglyceridemia without hypercholesterolemia 6/9/2019    Low back pain     Low HDL (under 40) 6/9/2019    MRSA carrier     Neutropenia, unspecified 5/8/2019    Obesity     Renal disorder     Rotator cuff syndrome--left     Thyroid disease     Ulnar neuropathy of right upper extremity     Uncontrolled type 2 diabetes mellitus with hyperglycemia        Past Surgical History:   Procedure Laterality Date    ACHILLES TENDON SURGERY Left 05/2015    ANGIOGRAM, CORONARY, WITH LEFT HEART CATHETERIZATION N/A 7/10/2023    Procedure: Angiogram, Coronary, with Left Heart Cath;  Surgeon: Ajay Hunter MD;  Location: [facility] CATH/EP LAB;  Service: Cardiology;  Laterality: N/A;    BACK SURGERY      CERVICAL SPINE SURGERY  2021    cervical gate placed by Dr. Vera- screws,plate,  "and "gate"    CHOLECYSTECTOMY      COLONOSCOPY N/A 09/06/2017    Procedure: COLONOSCOPY;  Surgeon: Marisol Bryson MD;  Location: [facility] ENDO;  Service: Endoscopy;  Laterality: N/A; REPEAT IN 3 YEARS FOR SURVEILLANCE    COLONOSCOPY N/A 05/09/2019    Procedure: COLONOSCOPY;  Surgeon: Fady Ewing MD;  Location: [facility] ENDO (2ND FLR);  Service: Endoscopy;  Laterality: N/A;    CORONARY ANGIOGRAPHY  08/31/2022    Procedure: ANGIOGRAM, CORONARY ARTERY;  Surgeon: Christoph Dang MD;  Location: [facility];  Service: Cardiology;;    DIALYSIS FISTULA CREATION  12/2017    ESOPHAGOGASTRODUODENOSCOPY N/A 05/09/2019    Procedure: EGD (ESOPHAGOGASTRODUODENOSCOPY);  Surgeon: Fady Ewing MD;  Location: [facility] ENDO (2ND FLR);  Service: Endoscopy;  Laterality: N/A;    ESOPHAGOGASTRODUODENOSCOPY N/A 06/10/2021    Procedure: EGD (ESOPHAGOGASTRODUODENOSCOPY);  Surgeon: Marisol Bryson MD;  Location: [facility];  Service: Endoscopy;  Laterality: N/A;    ESOPHAGOGASTRODUODENOSCOPY N/A 5/31/2023    Procedure: EGD (ESOPHAGOGASTRODUODENOSCOPY);  Surgeon: Marisol Bryson MD;  Location: [facility];  Service: Endoscopy;  Laterality: N/A;    FRACTURE SURGERY  1990    Ankle knee    HEMORRHOID SURGERY      INJECTION OF ANESTHETIC AGENT AROUND MEDIAL BRANCH NERVES INNERVATING LUMBAR FACET JOINT Right 09/25/2019    Procedure: Block-nerve-medial branch-lumbar;  Surgeon: Yonny Ferrer MD;  Location: [facility] OR;  Service: Pain Management;  Laterality: Right;  L2, 3, 4,5     INJECTION OF ANESTHETIC AGENT AROUND MEDIAL BRANCH NERVES INNERVATING LUMBAR FACET JOINT Right 10/16/2019    Procedure: Block-nerve-medial branch-lumbar;  Surgeon: Yonny Ferrer MD;  Location: [facility] OR;  Service: Pain Management;  Laterality: Right;  L2, 3, 4,5     JOINT REPLACEMENT      left    KIDNEY TRANSPLANT N/A 01/14/2019    Procedure: TRANSPLANT, KIDNEY;  Surgeon: Roland Salazar MD;  Location: [facility] OR 2ND FLR;  Service: Transplant;  Laterality: N/A;    KNEE SURGERY      " "RADIOFREQUENCY ABLATION OF LUMBAR MEDIAL BRANCH NERVE AT SINGLE LEVEL Right 10/29/2019    Procedure: Radiofrequency Ablation, Nerve, Spinal, Lumbar, Medial Branch, 1 Level;  Surgeon: Yonny Ferrer MD;  Location: [facility] OR;  Service: Pain Management;  Laterality: Right;  L2, 3, 4, 5  burned at 80 degrees C X 60 seconds each site X 2    SHOULDER ARTHROSCOPY      SHOULDER SURGERY      SPINE SURGERY  2020    Pain pump in back    UPPER GASTROINTESTINAL ENDOSCOPY  ~2013     [facility]         Current Outpatient Medications:     acetaminophen (TYLENOL) 650 MG TbSR, Take 650 mg by mouth every 8 (eight) hours., Disp: , Rfl:     allopurinoL (ZYLOPRIM) 100 MG tablet, Take 1 tablet (100 mg total) by mouth once daily., Disp: 90 tablet, Rfl: 1    amLODIPine (NORVASC) 5 MG tablet, Take 1 tablet by mouth once daily., Disp: , Rfl:     aspirin (ECOTRIN) 81 MG EC tablet, Take 1 tablet (81 mg total) by mouth once daily., Disp: 30 tablet, Rfl: 0    BD LENIN 2ND GEN PEN NEEDLE 32 gauge x 5/32" Ndle, Use up to four daily to inject novolog, Disp: 400 each, Rfl: 3    blood sugar diagnostic Strp, Check glucose 3 (three) times daily., Disp: 300 each, Rfl: PRN    blood-glucose sensor (DEXCOM G7 SENSOR) Amparo, Change every 10 days, Disp: 9 each, Rfl: 4    clopidogreL (PLAVIX) 75 mg tablet, Take one tablet by mouth daily, Disp: 90 tablet, Rfl: 0    colchicine, gout, (COLCRYS) 0.6 mg tablet, Take 1 tablet (0.6 mg total) by mouth 2 (two) times daily., Disp: 180 tablet, Rfl: 3    DEXCOM G7  Misc, Dispense 1, Disp: 1 each, Rfl: 0    diazePAM (VALIUM) 5 MG tablet, Take 1 tablet (5 mg total) by mouth every 6 (six) hours as needed for Anxiety., Disp: 1 tablet, Rfl: 0    doxazosin (CARDURA) 2 MG tablet, Take 2 mg by mouth once daily., Disp: , Rfl:     DULoxetine (CYMBALTA) 30 MG capsule, Take 30 mg by mouth once daily., Disp: , Rfl:     famotidine (PEPCID) 40 MG tablet, Take 1 tablet (40 mg total) by mouth every evening. (Patient not taking: Reported " on 10/25/2023), Disp: 30 tablet, Rfl: 6    flash glucose sensor (FREESTYLE DAKOTAH 14 DAY SENSOR) Kit, Use 1 sensor every 14 days to check blood glucose, Disp: 6 kit, Rfl: 3    insulin aspart U-100 (NOVOLOG FLEXPEN U-100 INSULIN) 100 unit/mL (3 mL) InPn pen, 32 u breakfast and supper, 28 u lunch + correction. Max TDD 140u (Patient taking differently: 10 Units as needed.), Disp: 45 mL, Rfl: 11    insulin glargine (LANTUS U-100 INSULIN) 100 unit/mL injection, INJECT 50 UNITS SUB Q AT BEDTIME, Disp: , Rfl:     isosorbide mononitrate (IMDUR) 30 MG 24 hr tablet, TAKE ONE TABLET BY MOUTH DAILY, Disp: 90 tablet, Rfl: 3    lancets (ONETOUCH ULTRASOFT LANCETS) Misc, Use to test blood sugar 3 x's a day, Disp: 300 each, Rfl: 3    lansoprazole (PREVACID) 30 MG capsule, Take 1 capsule (30 mg total) by mouth 2 (two) times a day., Disp: 60 capsule, Rfl: 1    levothyroxine (SYNTHROID) 75 MCG tablet, Take one tablet by mouth daily, Disp: 90 tablet, Rfl: 3    LIDOcaine (LIDODERM) 5 %, 1 patch., Disp: , Rfl:     LORazepam (ATIVAN) 1 MG tablet, Take 1 mg by mouth every evening., Disp: , Rfl:     losartan (COZAAR) 50 MG tablet, Take one tablet by mouth daily, Disp: 90 tablet, Rfl: 3    magnesium oxide 500 mg Tab, Take 500 mg by mouth once daily., Disp: , Rfl: 0    metFORMIN (GLUCOPHAGE-XR) 500 MG ER 24hr tablet, Take 1 tablet (500 mg total) by mouth 2 (two) times daily with meals., Disp: 180 tablet, Rfl: 1    metoprolol tartrate (LOPRESSOR) 25 MG tablet, Take 0.5 tablets (12.5 mg total) by mouth 2 (two) times daily., Disp: 30 tablet, Rfl: 1    multivitamin (THERAGRAN) tablet, Take 1 tablet by mouth once daily., Disp: , Rfl:     nitroGLYCERIN (NITROSTAT) 0.4 MG SL tablet, Place 1 tablet (0.4 mg total) under the tongue every 5 (five) minutes as needed for Chest pain. (Patient not taking: Reported on 10/25/2023), Disp: 20 tablet, Rfl: 1    ondansetron (ZOFRAN) 4 MG tablet, TAKE ONE TABLET BY MOUTH EVERY SIX HOURS, Disp: 20 tablet, Rfl: 1     sucralfate (CARAFATE) 1 gram tablet, Take 1 tablet (1 g total) by mouth 4 (four) times daily before meals and nightly., Disp: 120 tablet, Rfl: 3    tacrolimus (PROGRAF) 1 MG Cap, Take 1 capsule (1 mg total) by mouth every 12 (twelve) hours. (Patient taking differently: Take 1 mg by mouth 2 (two) times a day.), Disp: 60 capsule, Rfl: 11    tiZANidine (ZANAFLEX) 4 MG tablet, Take 1 tablet by mouth every evening., Disp: , Rfl:     vilazodone (VIIBRYD) 40 mg Tab tablet, Take 1 tablet (40 mg total) by mouth once daily., Disp: 30 tablet, Rfl: 4    VRAYLAR 3 mg Cap, Take 3 mg by mouth once daily., Disp: , Rfl:     Review of patient's allergies indicates:   Allergen Reactions    Dilaudid [hydromorphone] Itching    Morphine Itching    Torsemide Diarrhea     Diarrhea stopped once discontinued med    Codeine Itching     Can take oxycodone and hydrocodone with Benadryl       Family History   Problem Relation Age of Onset    Diabetes Mother     Alzheimer's disease Mother     Thyroid disease Mother     Hyperlipidemia Mother     Arthritis Mother     Depression Mother     Heart disease Mother     Heart disease Father     Stroke Father     Diabetes Sister     Lupus Sister     Ovarian cancer Sister     Mental illness Sister     Heart disease Brother     COPD Maternal Aunt     Diabetes Maternal Aunt     Heart disease Maternal Aunt     Hypertension Maternal Aunt     No Known Problems Maternal Uncle     No Known Problems Paternal Aunt     No Known Problems Paternal Uncle     Diabetes Maternal Grandmother     Hypertension Maternal Grandmother     Kidney disease Maternal Grandmother     No Known Problems Paternal Grandmother     No Known Problems Paternal Grandfather     No Known Problems Son     Colon cancer Neg Hx     Colon polyps Neg Hx     Crohn's disease Neg Hx     Ulcerative colitis Neg Hx     Celiac disease Neg Hx     Esophageal cancer Neg Hx     Stomach cancer Neg Hx        Social History     Tobacco Use    Smoking status: Never  "   Smokeless tobacco: Never   Substance Use Topics    Alcohol use: No     Alcohol/week: 0.0 standard drinks of alcohol    Drug use: No       REVIEW OF SYSTEMS:  General: No chills, fever, malaise, changes in weight  HEENT: No visual changes, difficulty hearing  Cardiovascular: No chest pain, palpitations, claudication  Pulmonary: No dyspnea, cough, wheezing  Gastrointestinal: No nausea, vomiting, diarrhea, constipation  Genitourinary: No dysuria, low urine output, hematuria  Endocrine: No polydipsia, polyphagia  Hematologic: No fatigue with exertion, pica, pallor  Musculoskeletal: No extremity or joint pain, no back pain, no difficulty with ambulation  Neurologic: no seizures, no headaches, no weakness  Psychiatric: no mood disturbance      PHYSICAL EXAM:   BP (!) 118/57 (BP Location: Right arm, Patient Position: Sitting, BP Method: Large (Automatic))   Pulse 74   Temp 98.5 °F (36.9 °C) (Oral)   Ht 5' 6" (1.676 m)   Wt 84 kg (185 lb 3 oz)   LMP 02/28/2012   BMI 29.89 kg/m²   Constitutional:  Alert,   Well-appearing  In no distress.   Neurological: Normal speech  no focal findings  CN II - XII grossly intact.    Psychiatric: Mood and affect appropriate and symmetric.   HEENT: Normocephalic / atraumatic  PERRLA  Midline trachea  No scars across the neck   Cardiac: Regular rate and rhythm.   Pulmonary: Normal pulmonary effort.   Abdomen: Soft, not distended.     Skin: Warm and well perfused.    Vascular:  1+ left radial pulse   Extremities/  Musculoskeletal: No edema.   Left arm demonstrates a question misa brachiocephalic AV fistula that is moderately pulsatile.  There were 2 areas of very modest aneurysmal changes of proximally 1 cm.  No skin thinning or ulceration.  See picture     10/31/2023  IMAGING:  Duplex of the fistula shows a moderate outflow stenosis with a velocity of 480.  Flow volume is robust at 2.2 L    IMPRESSION: modestly aneurysmal left AV fistula.  I have reassured the patient this poses no " significant risk to her and has no indication for aneurysmorrhaphy or ligation    PLAN:   P.r.n. follow-up    MARIA TERESA Villaseñor III, MD, FACS  Professor and Chief, Vascular and Endovascular Surgery

## 2023-10-31 NOTE — LETTER
[facility] - Vascular Surg 5th Fl  [address]  [facility] [address]  Phone: [phone]  Fax: [phone] November 7, 2023        Fady Vaughn MD  [address] [address]    Patient: Andra Newell   MR Number: [id number]   YOB: 1960   Date of Visit: 10/31/2023     Dear Dr. Vaughn:    Thank you for referring Andra Newell to me for evaluation. Attached are the relevant portions of my assessment and plan of care.    If you have questions, please do not hesitate to call me. I look forward to following Andra along with you.    Sincerely,    YUVAL Villaseñor III, MD   Professor and Chief  Vascular and Endovascular Surgery  Vice-Chair, Department of Surgery  [facility]        Was A Bandage Applied: Yes

## 2023-11-01 ENCOUNTER — OFFICE VISIT (OUTPATIENT)
Dept: ENDOCRINOLOGY | Facility: CLINIC | Age: 63
End: 2023-11-01
Payer: MEDICARE

## 2023-11-01 VITALS
DIASTOLIC BLOOD PRESSURE: 72 MMHG | HEART RATE: 68 BPM | BODY MASS INDEX: 29.76 KG/M2 | HEIGHT: 66 IN | WEIGHT: 185.19 LBS | SYSTOLIC BLOOD PRESSURE: 130 MMHG

## 2023-11-01 DIAGNOSIS — I12.9 HYPERTENSION ASSOCIATED WITH STAGE 3 CHRONIC KIDNEY DISEASE DUE TO TYPE 2 DIABETES MELLITUS: Chronic | ICD-10-CM

## 2023-11-01 DIAGNOSIS — E03.9 ACQUIRED HYPOTHYROIDISM: Chronic | ICD-10-CM

## 2023-11-01 DIAGNOSIS — N18.31 TYPE 2 DIABETES MELLITUS WITH STAGE 3A CHRONIC KIDNEY DISEASE, WITH LONG-TERM CURRENT USE OF INSULIN: Primary | ICD-10-CM

## 2023-11-01 DIAGNOSIS — E11.69 MIXED DIABETIC HYPERLIPIDEMIA ASSOCIATED WITH TYPE 2 DIABETES MELLITUS: Chronic | ICD-10-CM

## 2023-11-01 DIAGNOSIS — E78.2 MIXED DIABETIC HYPERLIPIDEMIA ASSOCIATED WITH TYPE 2 DIABETES MELLITUS: Chronic | ICD-10-CM

## 2023-11-01 DIAGNOSIS — E11.22 HYPERTENSION ASSOCIATED WITH STAGE 3 CHRONIC KIDNEY DISEASE DUE TO TYPE 2 DIABETES MELLITUS: Chronic | ICD-10-CM

## 2023-11-01 DIAGNOSIS — D84.9 IMMUNOSUPPRESSION: Chronic | ICD-10-CM

## 2023-11-01 DIAGNOSIS — N18.30 HYPERTENSION ASSOCIATED WITH STAGE 3 CHRONIC KIDNEY DISEASE DUE TO TYPE 2 DIABETES MELLITUS: Chronic | ICD-10-CM

## 2023-11-01 DIAGNOSIS — Z79.4 TYPE 2 DIABETES MELLITUS WITH STAGE 3A CHRONIC KIDNEY DISEASE, WITH LONG-TERM CURRENT USE OF INSULIN: Primary | ICD-10-CM

## 2023-11-01 DIAGNOSIS — E11.22 TYPE 2 DIABETES MELLITUS WITH STAGE 3A CHRONIC KIDNEY DISEASE, WITH LONG-TERM CURRENT USE OF INSULIN: Primary | ICD-10-CM

## 2023-11-01 DIAGNOSIS — I25.119 ATHEROSCLEROSIS OF NATIVE CORONARY ARTERY OF NATIVE HEART WITH ANGINA PECTORIS: ICD-10-CM

## 2023-11-01 PROCEDURE — 3075F SYST BP GE 130 - 139MM HG: CPT | Mod: CPTII,S$GLB,, | Performed by: NURSE PRACTITIONER

## 2023-11-01 PROCEDURE — 99214 OFFICE O/P EST MOD 30 MIN: CPT | Mod: S$GLB,,, | Performed by: NURSE PRACTITIONER

## 2023-11-01 PROCEDURE — 3075F PR MOST RECENT SYSTOLIC BLOOD PRESS GE 130-139MM HG: ICD-10-PCS | Mod: CPTII,S$GLB,, | Performed by: NURSE PRACTITIONER

## 2023-11-01 PROCEDURE — 3008F PR BODY MASS INDEX (BMI) DOCUMENTED: ICD-10-PCS | Mod: CPTII,S$GLB,, | Performed by: NURSE PRACTITIONER

## 2023-11-01 PROCEDURE — 3044F HG A1C LEVEL LT 7.0%: CPT | Mod: CPTII,S$GLB,, | Performed by: NURSE PRACTITIONER

## 2023-11-01 PROCEDURE — 95251 PR GLUCOSE MONITOR, 72 HOUR, PHYS INTERP: ICD-10-PCS | Mod: S$GLB,,, | Performed by: NURSE PRACTITIONER

## 2023-11-01 PROCEDURE — 3008F BODY MASS INDEX DOCD: CPT | Mod: CPTII,S$GLB,, | Performed by: NURSE PRACTITIONER

## 2023-11-01 PROCEDURE — 3066F PR DOCUMENTATION OF TREATMENT FOR NEPHROPATHY: ICD-10-PCS | Mod: CPTII,S$GLB,, | Performed by: NURSE PRACTITIONER

## 2023-11-01 PROCEDURE — 3061F NEG MICROALBUMINURIA REV: CPT | Mod: CPTII,S$GLB,, | Performed by: NURSE PRACTITIONER

## 2023-11-01 PROCEDURE — 3044F PR MOST RECENT HEMOGLOBIN A1C LEVEL <7.0%: ICD-10-PCS | Mod: CPTII,S$GLB,, | Performed by: NURSE PRACTITIONER

## 2023-11-01 PROCEDURE — 99214 PR OFFICE/OUTPT VISIT, EST, LEVL IV, 30-39 MIN: ICD-10-PCS | Mod: S$GLB,,, | Performed by: NURSE PRACTITIONER

## 2023-11-01 PROCEDURE — 4010F PR ACE/ARB THEARPY RXD/TAKEN: ICD-10-PCS | Mod: CPTII,S$GLB,, | Performed by: NURSE PRACTITIONER

## 2023-11-01 PROCEDURE — 1157F ADVNC CARE PLAN IN RCRD: CPT | Mod: CPTII,S$GLB,, | Performed by: NURSE PRACTITIONER

## 2023-11-01 PROCEDURE — 1157F PR ADVANCE CARE PLAN OR EQUIV PRESENT IN MEDICAL RECORD: ICD-10-PCS | Mod: CPTII,S$GLB,, | Performed by: NURSE PRACTITIONER

## 2023-11-01 PROCEDURE — 95251 CONT GLUC MNTR ANALYSIS I&R: CPT | Mod: S$GLB,,, | Performed by: NURSE PRACTITIONER

## 2023-11-01 PROCEDURE — 3066F NEPHROPATHY DOC TX: CPT | Mod: CPTII,S$GLB,, | Performed by: NURSE PRACTITIONER

## 2023-11-01 PROCEDURE — 3061F PR NEG MICROALBUMINURIA RESULT DOCUMENTED/REVIEW: ICD-10-PCS | Mod: CPTII,S$GLB,, | Performed by: NURSE PRACTITIONER

## 2023-11-01 PROCEDURE — 4010F ACE/ARB THERAPY RXD/TAKEN: CPT | Mod: CPTII,S$GLB,, | Performed by: NURSE PRACTITIONER

## 2023-11-01 PROCEDURE — 3078F PR MOST RECENT DIASTOLIC BLOOD PRESSURE < 80 MM HG: ICD-10-PCS | Mod: CPTII,S$GLB,, | Performed by: NURSE PRACTITIONER

## 2023-11-01 PROCEDURE — 3078F DIAST BP <80 MM HG: CPT | Mod: CPTII,S$GLB,, | Performed by: NURSE PRACTITIONER

## 2023-11-01 RX ORDER — BLOOD-GLUCOSE SENSOR
EACH MISCELLANEOUS
Qty: 9 EACH | Refills: 4 | Status: SHIPPED | OUTPATIENT
Start: 2023-11-01

## 2023-11-01 RX ORDER — INSULIN ASPART 100 [IU]/ML
INJECTION, SOLUTION INTRAVENOUS; SUBCUTANEOUS
Qty: 30 ML | Refills: 4 | Status: SHIPPED | OUTPATIENT
Start: 2023-11-01

## 2023-11-01 RX ORDER — METFORMIN HYDROCHLORIDE 500 MG/1
500 TABLET, EXTENDED RELEASE ORAL 2 TIMES DAILY WITH MEALS
Qty: 180 TABLET | Refills: 1 | Status: SHIPPED | OUTPATIENT
Start: 2023-11-01

## 2023-11-01 RX ORDER — INSULIN DEGLUDEC 200 U/ML
INJECTION, SOLUTION SUBCUTANEOUS
Qty: 2 PEN | Refills: 6 | Status: SHIPPED | OUTPATIENT
Start: 2023-11-01

## 2023-11-03 ENCOUNTER — PATIENT OUTREACH (OUTPATIENT)
Dept: PRIMARY CARE CLINIC | Facility: CLINIC | Age: 63
End: 2023-11-03
Payer: MEDICARE

## 2023-11-03 ENCOUNTER — TELEPHONE (OUTPATIENT)
Dept: PRIMARY CARE CLINIC | Facility: CLINIC | Age: 63
End: 2023-11-03
Payer: MEDICARE

## 2023-11-03 NOTE — TELEPHONE ENCOUNTER
Spoke with pt, requested diabetic eye exam information, informed pt that she is due for her mammogram.  Pt reports that she had her eye exam this year with Dr. Soniya Ortega and that she is going to schedule her mammogram after speaking with me.  Pt reports that she is having some concerns with her psychiatric meds being adjusted.  Informed pt that [facility] has a LCSW available if she feels like she would like to talk to someone about her concerns, that all she needs to do is let us know, pt verbalized understanding.

## 2023-11-07 ENCOUNTER — PATIENT MESSAGE (OUTPATIENT)
Dept: RHEUMATOLOGY | Facility: CLINIC | Age: 63
End: 2023-11-07

## 2023-11-07 ENCOUNTER — HOSPITAL ENCOUNTER (OUTPATIENT)
Dept: RADIOLOGY | Facility: HOSPITAL | Age: 63
Discharge: HOME | End: 2023-11-07
Attending: INTERNAL MEDICINE
Payer: MEDICARE

## 2023-11-07 ENCOUNTER — OFFICE VISIT (OUTPATIENT)
Dept: RHEUMATOLOGY | Facility: CLINIC | Age: 63
End: 2023-11-07
Payer: MEDICARE

## 2023-11-07 VITALS
HEIGHT: 66 IN | HEART RATE: 81 BPM | BODY MASS INDEX: 29.9 KG/M2 | WEIGHT: 186.06 LBS | DIASTOLIC BLOOD PRESSURE: 75 MMHG | SYSTOLIC BLOOD PRESSURE: 134 MMHG

## 2023-11-07 DIAGNOSIS — M79.10 MYALGIA: ICD-10-CM

## 2023-11-07 DIAGNOSIS — M25.50 MULTIPLE JOINT PAIN: ICD-10-CM

## 2023-11-07 DIAGNOSIS — L53.8 PALMAR ERYTHEMA: ICD-10-CM

## 2023-11-07 DIAGNOSIS — M54.12 CERVICAL RADICULOPATHY: ICD-10-CM

## 2023-11-07 DIAGNOSIS — M19.041 PRIMARY OSTEOARTHRITIS OF BOTH HANDS: ICD-10-CM

## 2023-11-07 DIAGNOSIS — M19.042 PRIMARY OSTEOARTHRITIS OF BOTH HANDS: ICD-10-CM

## 2023-11-07 DIAGNOSIS — M25.50 MULTIPLE JOINT PAIN: Primary | ICD-10-CM

## 2023-11-07 DIAGNOSIS — M1A.09X0 IDIOPATHIC CHRONIC GOUT OF MULTIPLE SITES WITHOUT TOPHUS: ICD-10-CM

## 2023-11-07 PROCEDURE — 3008F BODY MASS INDEX DOCD: CPT | Mod: CPTII,S$GLB,, | Performed by: INTERNAL MEDICINE

## 2023-11-07 PROCEDURE — 3288F PR FALLS RISK ASSESSMENT DOCUMENTED: ICD-10-PCS | Mod: CPTII,S$GLB,, | Performed by: INTERNAL MEDICINE

## 2023-11-07 PROCEDURE — 1159F PR MEDICATION LIST DOCUMENTED IN MEDICAL RECORD: ICD-10-PCS | Mod: CPTII,S$GLB,, | Performed by: INTERNAL MEDICINE

## 2023-11-07 PROCEDURE — 99205 PR OFFICE/OUTPT VISIT, NEW, LEVL V, 60-74 MIN: ICD-10-PCS | Mod: S$GLB,,, | Performed by: INTERNAL MEDICINE

## 2023-11-07 PROCEDURE — 3075F SYST BP GE 130 - 139MM HG: CPT | Mod: CPTII,S$GLB,, | Performed by: INTERNAL MEDICINE

## 2023-11-07 PROCEDURE — 3066F PR DOCUMENTATION OF TREATMENT FOR NEPHROPATHY: ICD-10-PCS | Mod: CPTII,S$GLB,, | Performed by: INTERNAL MEDICINE

## 2023-11-07 PROCEDURE — 4010F ACE/ARB THERAPY RXD/TAKEN: CPT | Mod: CPTII,S$GLB,, | Performed by: INTERNAL MEDICINE

## 2023-11-07 PROCEDURE — 99999 PR PBB SHADOW E&M-EST. PATIENT-LVL V: CPT | Mod: PBBFAC,,, | Performed by: INTERNAL MEDICINE

## 2023-11-07 PROCEDURE — 3061F PR NEG MICROALBUMINURIA RESULT DOCUMENTED/REVIEW: ICD-10-PCS | Mod: CPTII,S$GLB,, | Performed by: INTERNAL MEDICINE

## 2023-11-07 PROCEDURE — 3078F PR MOST RECENT DIASTOLIC BLOOD PRESSURE < 80 MM HG: ICD-10-PCS | Mod: CPTII,S$GLB,, | Performed by: INTERNAL MEDICINE

## 2023-11-07 PROCEDURE — 3008F PR BODY MASS INDEX (BMI) DOCUMENTED: ICD-10-PCS | Mod: CPTII,S$GLB,, | Performed by: INTERNAL MEDICINE

## 2023-11-07 PROCEDURE — 3066F NEPHROPATHY DOC TX: CPT | Mod: CPTII,S$GLB,, | Performed by: INTERNAL MEDICINE

## 2023-11-07 PROCEDURE — 3044F HG A1C LEVEL LT 7.0%: CPT | Mod: CPTII,S$GLB,, | Performed by: INTERNAL MEDICINE

## 2023-11-07 PROCEDURE — 73130 XR HAND COMPLETE 3 VIEWS BILATERAL: ICD-10-PCS | Mod: 26,50,, | Performed by: RADIOLOGY

## 2023-11-07 PROCEDURE — 73130 X-RAY EXAM OF HAND: CPT | Mod: TC,50

## 2023-11-07 PROCEDURE — 3044F PR MOST RECENT HEMOGLOBIN A1C LEVEL <7.0%: ICD-10-PCS | Mod: CPTII,S$GLB,, | Performed by: INTERNAL MEDICINE

## 2023-11-07 PROCEDURE — 1157F PR ADVANCE CARE PLAN OR EQUIV PRESENT IN MEDICAL RECORD: ICD-10-PCS | Mod: CPTII,S$GLB,, | Performed by: INTERNAL MEDICINE

## 2023-11-07 PROCEDURE — 1157F ADVNC CARE PLAN IN RCRD: CPT | Mod: CPTII,S$GLB,, | Performed by: INTERNAL MEDICINE

## 2023-11-07 PROCEDURE — 99999 PR PBB SHADOW E&M-EST. PATIENT-LVL V: ICD-10-PCS | Mod: PBBFAC,,, | Performed by: INTERNAL MEDICINE

## 2023-11-07 PROCEDURE — 3078F DIAST BP <80 MM HG: CPT | Mod: CPTII,S$GLB,, | Performed by: INTERNAL MEDICINE

## 2023-11-07 PROCEDURE — 1159F MED LIST DOCD IN RCRD: CPT | Mod: CPTII,S$GLB,, | Performed by: INTERNAL MEDICINE

## 2023-11-07 PROCEDURE — 4010F PR ACE/ARB THEARPY RXD/TAKEN: ICD-10-PCS | Mod: CPTII,S$GLB,, | Performed by: INTERNAL MEDICINE

## 2023-11-07 PROCEDURE — 1101F PT FALLS ASSESS-DOCD LE1/YR: CPT | Mod: CPTII,S$GLB,, | Performed by: INTERNAL MEDICINE

## 2023-11-07 PROCEDURE — 3288F FALL RISK ASSESSMENT DOCD: CPT | Mod: CPTII,S$GLB,, | Performed by: INTERNAL MEDICINE

## 2023-11-07 PROCEDURE — 99205 OFFICE O/P NEW HI 60 MIN: CPT | Mod: S$GLB,,, | Performed by: INTERNAL MEDICINE

## 2023-11-07 PROCEDURE — 3075F PR MOST RECENT SYSTOLIC BLOOD PRESS GE 130-139MM HG: ICD-10-PCS | Mod: CPTII,S$GLB,, | Performed by: INTERNAL MEDICINE

## 2023-11-07 PROCEDURE — 73130 X-RAY EXAM OF HAND: CPT | Mod: 26,50,, | Performed by: RADIOLOGY

## 2023-11-07 PROCEDURE — 1101F PR PT FALLS ASSESS DOC 0-1 FALLS W/OUT INJ PAST YR: ICD-10-PCS | Mod: CPTII,S$GLB,, | Performed by: INTERNAL MEDICINE

## 2023-11-07 PROCEDURE — 3061F NEG MICROALBUMINURIA REV: CPT | Mod: CPTII,S$GLB,, | Performed by: INTERNAL MEDICINE

## 2023-11-07 RX ORDER — HYDROXYCHLOROQUINE SULFATE 200 MG/1
200 TABLET, FILM COATED ORAL 2 TIMES DAILY
Qty: 60 TABLET | Refills: 6 | Status: SHIPPED | OUTPATIENT
Start: 2023-11-07

## 2023-11-07 RX ORDER — COLCHICINE 0.6 MG/1
0.6 TABLET ORAL DAILY
Qty: 90 TABLET | Refills: 3 | Status: SHIPPED | OUTPATIENT
Start: 2023-11-07 | End: 2024-11-06

## 2023-11-07 NOTE — PROGRESS NOTES
RHEUMATOLOGY CLINIC INITIAL VISIT    Reason for consult:-  Multiple joint pain  Chief complaints, HPI, ROS, EXAM, Assessment & Plans:-  Andra Timmons a 63 y.o. pleasant female comes in with multiple joint pain for the past several months.  Acute onset worsening pain involving bilateral hands, shoulders and hips associated with prolonged morning stiffness but no significant swelling since 3 months.  Woke up 1 day with such pain.  No psoriasis.  Significant palmar erythema with onset of pain but has gotten better in the last month.  History of gout recently started on allopurinol by primary care physician.  No recent gout attacks.  No pericarditis like chest pain.  On colchicine twice daily.  Severe cervical and lumbar degenerative disc disease with radiculopathy symptoms.  On fentanyl pump for lumbar degenerative disc disease.  Mild myalgia.  Rheumatological review of system negative otherwise.  Physical examination shows tenderness of multiple PIP and MCP joints but no significant synovitis.  Bilateral palmar erythema.  Significant tenderness of cervical region and lumbar region.  Tenderness of right knee with mild effusion.    1. Multiple joint pain    2. Primary osteoarthritis of both hands    3. Palmar erythema    4. Idiopathic chronic gout of multiple sites without tophus    5. Cervical radiculopathy    6. Myalgia      Problem List Items Addressed This Visit       DJD (degenerative joint disease) (Chronic)    Relevant Medications    hydroxychloroquine (PLAQUENIL) 200 mg tablet    Other Relevant Orders    X-Ray Hand 3 View Bilateral (Completed)    EMG W/ ULTRASOUND AND NERVE CONDUCTION TEST 2 Extremities    Multiple joint pain - Primary    Relevant Orders    X-Ray Hand 3 View Bilateral (Completed)    EMG W/ ULTRASOUND AND NERVE CONDUCTION TEST 2 Extremities    LEXY Screen w/Reflex    Proteinase 3 Autoantibodies    Myeloperoxidase Antibody (MPO)    Anti-Neutrophilic Cytoplasmic Antibody     Other Visit  Diagnoses       Palmar erythema        Relevant Orders    X-Ray Hand 3 View Bilateral (Completed)    EMG W/ ULTRASOUND AND NERVE CONDUCTION TEST 2 Extremities    Proteinase 3 Autoantibodies    Myeloperoxidase Antibody (MPO)    Anti-Neutrophilic Cytoplasmic Antibody    Idiopathic chronic gout of multiple sites without tophus        Relevant Medications    colchicine, gout, (COLCRYS) 0.6 mg tablet    Other Relevant Orders    Uric Acid (Completed)    Cervical radiculopathy        Relevant Orders    EMG W/ ULTRASOUND AND NERVE CONDUCTION TEST 2 Extremities    Myalgia        Relevant Orders    CK    HMGCR (HMG Coenzyme A Reductase) Ab           Latest Reference Range & Units 08/22/23 09:39   CCP Antibodies <5.0 U/mL 0.5   Rheumatoid Factor 0.0 - 15.0 IU/mL <13.0      Latest Reference Range & Units 08/22/23 09:39   WBC 3.90 - 12.70 K/uL 4.83   RBC 4.00 - 5.40 M/uL 4.10   Hemoglobin 12.0 - 16.0 g/dL 11.1 (L)   Hematocrit 37.0 - 48.5 % 36.7 (L)   MCV 82 - 98 fL 90   MCH 27.0 - 31.0 pg 27.1   MCHC 32.0 - 36.0 g/dL 30.2 (L)   RDW 11.5 - 14.5 % 15.0 (H)   Platelet Count 150 - 450 K/uL 159   MPV 9.2 - 12.9 fL 10.8   Gran % 38.0 - 73.0 % 64.4   Lymph % 18.0 - 48.0 % 20.7   Mono % 4.0 - 15.0 % 6.6   Eosinophil % 0.0 - 8.0 % 7.5   Basophil % 0.0 - 1.9 % 0.6   Immature Granulocytes 0.0 - 0.5 % 0.2   Gran # (ANC) 1.8 - 7.7 K/uL 3.1   Lymph # 1.0 - 4.8 K/uL 1.0   Mono # 0.3 - 1.0 K/uL 0.3   Eos # 0.0 - 0.5 K/uL 0.4   Baso # 0.00 - 0.20 K/uL 0.03   Immature Grans (Abs) 0.00 - 0.04 K/uL 0.01   nRBC 0 /100 WBC 0   Differential Method  Automated   Sed Rate 0 - 36 mm/Hr 39 (H)   (L): Data is abnormally low  (H): Data is abnormally high   Latest Reference Range & Units 08/22/23 09:39   Uric Acid 2.4 - 5.7 mg/dL 7.6 (H)   CRP 0.0 - 8.2 mg/L 6.1   (H): Data is abnormally high      Acute onset polyarthralgia but no evidence of active synovitis on exam today.  Palmar erythema and context of severe cervical degenerative disc disease raises  possibility of radiculopathy and myelopathy related pain.  Normal CRP, negative rheumatoid factor and CCP.  Appropriately elevated sedimentation rate for chronic kidney disease.  No evidence of rheumatoid arthritis, lupus arthritis, psoriatic arthritis or undifferentiated inflammatory arthritis at this time.  Exam shows more osteoarthritis of bilateral hands with significant carpometacarpal joint squaring and tenderness.  Try Plaquenil for osteoarthritis since she has contraindications to multiple medications including NSAIDs, prednisone and herbal supplements.  Repeat uric acid shows significant improvement on low-dose allopurinol.  Continue the same.  Reduce colchicine to once daily to reduce colchicine induced myonecrosis since she has chronic kidney disease.  Would try to reduce further to 3 times weekly in future.  I have explained all of the above in detail and the patient understands all of the current recommendation(s). I have answered all questions to the best of my ability and to their complete satisfaction.     Total time spent 60 minutes including time needed to review the records, the   patient evaluation, documentation, face-to-face discussion with the patient,    coordination of care and counseling.    Level V visit.      # Follow up in about 6 weeks (around 12/19/2023).      Past Medical History:   Diagnosis Date    Anemia     Anemia in chronic kidney disease, on chronic dialysis 7/12/2017    Anxiety and depression     Aplastic anemia with parvovirus B19 infection 5/27/2019    Aplastic anemia with parvovirus B19 infection 4/2019 5/27/2019    Arthritis     Asthma     allergic airway    CKD stage III (moderate) 2/18/2019    Colon polyp     Depression     Diabetes mellitus     Diverticulosis     Encounter for blood transfusion     Eosinophilia     ESRD (end stage renal disease)     stage V, due for living donor 9/2018    ESRD on dialysis     GERD (gastroesophageal reflux disease)     Gout     Gout      "Hyperlipidemia     Hypertension     Hypertriglyceridemia without hypercholesterolemia 6/9/2019    Low back pain     Low HDL (under 40) 6/9/2019    MRSA carrier     Neutropenia, unspecified 5/8/2019    Obesity     Renal disorder     Rotator cuff syndrome--left     Thyroid disease     Ulnar neuropathy of right upper extremity     Uncontrolled type 2 diabetes mellitus with hyperglycemia        Past Surgical History:   Procedure Laterality Date    ACHILLES TENDON SURGERY Left 05/2015    ANGIOGRAM, CORONARY, WITH LEFT HEART CATHETERIZATION N/A 7/10/2023    Procedure: Angiogram, Coronary, with Left Heart Cath;  Surgeon: Ajay Hunter MD;  Location: [facility] CATH/EP LAB;  Service: Cardiology;  Laterality: N/A;    BACK SURGERY      CERVICAL SPINE SURGERY  2021    cervical gate placed by Dr. Vera- valeria,asim, and "gate"    CHOLECYSTECTOMY      COLONOSCOPY N/A 09/06/2017    Procedure: COLONOSCOPY;  Surgeon: Marisol Bryson MD;  Location: [facility];  Service: Endoscopy;  Laterality: N/A; REPEAT IN 3 YEARS FOR SURVEILLANCE    COLONOSCOPY N/A 05/09/2019    Procedure: COLONOSCOPY;  Surgeon: Fady Ewing MD;  [facility]  Service: Endoscopy;  Laterality: N/A;    CORONARY ANGIOGRAPHY  08/31/2022    Procedure: ANGIOGRAM, CORONARY ARTERY;  Surgeon: Christoph Dang MD;  Location: [facility] CATH;  Service: Cardiology;;    DIALYSIS FISTULA CREATION  12/2017    ESOPHAGOGASTRODUODENOSCOPY N/A 05/09/2019    Procedure: EGD (ESOPHAGOGASTRODUODENOSCOPY);  Surgeon: Fady Ewing MD;  Location: [facility] ([facility]LR);  Service: Endoscopy;  Laterality: N/A;    ESOPHAGOGASTRODUODENOSCOPY N/A 06/10/2021    Procedure: EGD (ESOPHAGOGASTRODUODENOSCOPY);  Surgeon: Marisol Bryson MD;  Location: [facility] ENDO;  Service: Endoscopy;  Laterality: N/A;    ESOPHAGOGASTRODUODENOSCOPY N/A 5/31/2023    Procedure: EGD (ESOPHAGOGASTRODUODENOSCOPY);  Surgeon: Marisol Bryson MD;  Location: [facility] ENDO;  Service: Endoscopy;  Laterality: " N/A;    FRACTURE SURGERY  1990    Ankle knee    HEMORRHOID SURGERY      INJECTION OF ANESTHETIC AGENT AROUND MEDIAL BRANCH NERVES INNERVATING LUMBAR FACET JOINT Right 09/25/2019    Procedure: Block-nerve-medial branch-lumbar;  Surgeon: Yonny Ferrer MD;  [facility];  Service: Pain Management;  Laterality: Right;  L2, 3, 4,5     INJECTION OF ANESTHETIC AGENT AROUND MEDIAL BRANCH NERVES INNERVATING LUMBAR FACET JOINT Right 10/16/2019    Procedure: Block-nerve-medial branch-lumbar;  Surgeon: Yonny Ferrer MD;  [facility];  Service: Pain Management;  Laterality: Right;  L2, 3, 4,5     JOINT REPLACEMENT      left    KIDNEY TRANSPLANT N/A 01/14/2019    Procedure: TRANSPLANT, KIDNEY;  Surgeon: Roland Salazar MD;  Location: [address];  Service: Transplant;  Laterality: N/A;    KNEE SURGERY      RADIOFREQUENCY ABLATION OF LUMBAR MEDIAL BRANCH NERVE AT SINGLE LEVEL Right 10/29/2019    Procedure: Radiofrequency Ablation, Nerve, Spinal, Lumbar, Medial Branch, 1 Level;  Surgeon: Yonny Ferrer MD;  [facility]: [facility];  Service: Pain Management;  Laterality: Right;  L2, 3, 4, 5  burned at 80 degrees C X 60 seconds each site X 2    SHOULDER ARTHROSCOPY      SHOULDER SURGERY      SPINE SURGERY  2020    Pain pump in back    UPPER GASTROINTESTINAL ENDOSCOPY  ~2013    Dr. Weineror        Social History     Tobacco Use    Smoking status: Never    Smokeless tobacco: Never   Substance Use Topics    Alcohol use: No     Alcohol/week: 0.0 standard drinks of alcohol    Drug use: No       Family History   Problem Relation Age of Onset    Diabetes Mother     Alzheimer's disease Mother     Thyroid disease Mother     Hyperlipidemia Mother     Arthritis Mother     Depression Mother     Heart disease Mother     Heart disease Father     Stroke Father     Diabetes Sister     Lupus Sister     Ovarian cancer Sister     Mental illness Sister     Heart disease Brother     COPD Maternal Aunt     Diabetes Maternal Aunt     Heart disease Maternal  "Aunt     Hypertension Maternal Aunt     No Known Problems Maternal Uncle     No Known Problems Paternal Aunt     No Known Problems Paternal Uncle     Diabetes Maternal Grandmother     Hypertension Maternal Grandmother     Kidney disease Maternal Grandmother     No Known Problems Paternal Grandmother     No Known Problems Paternal Grandfather     No Known Problems Son     Colon cancer Neg Hx     Colon polyps Neg Hx     Crohn's disease Neg Hx     Ulcerative colitis Neg Hx     Celiac disease Neg Hx     Esophageal cancer Neg Hx     Stomach cancer Neg Hx        Review of patient's allergies indicates:   Allergen Reactions    Dilaudid [hydromorphone] Itching    Morphine Itching    Torsemide Diarrhea     Diarrhea stopped once discontinued med    Codeine Itching     Can take oxycodone and hydrocodone with Benadryl       Medication List with Changes/Refills   New Medications    HYDROXYCHLOROQUINE (PLAQUENIL) 200 MG TABLET    Take 1 tablet (200 mg total) by mouth 2 (two) times daily.   Current Medications    ACETAMINOPHEN (TYLENOL) 650 MG TBSR    Take 650 mg by mouth every 8 (eight) hours.    ALLOPURINOL (ZYLOPRIM) 100 MG TABLET    Take 1 tablet (100 mg total) by mouth once daily.    AMLODIPINE (NORVASC) 5 MG TABLET    Take 1 tablet by mouth once daily.    ASPIRIN (ECOTRIN) 81 MG EC TABLET    Take 1 tablet (81 mg total) by mouth once daily.    BD LENIN 2ND GEN PEN NEEDLE 32 GAUGE X 5/32" NDLE    Use up to four daily to inject novolog    BLOOD SUGAR DIAGNOSTIC STRP    Check glucose 3 (three) times daily.    BLOOD-GLUCOSE SENSOR (DEXCOM G7 SENSOR) MARGARET    Change every 10 days    CLOPIDOGREL (PLAVIX) 75 MG TABLET    Take one tablet by mouth daily    DEXCOM G7  MISC    Dispense 1    DIAZEPAM (VALIUM) 5 MG TABLET    Take 1 tablet (5 mg total) by mouth every 6 (six) hours as needed for Anxiety.    DOXAZOSIN (CARDURA) 2 MG TABLET    Take 2 mg by mouth once daily.    DULOXETINE (CYMBALTA) 30 MG CAPSULE    Take 30 mg by mouth " once daily.    FAMOTIDINE (PEPCID) 40 MG TABLET    Take 1 tablet (40 mg total) by mouth every evening.    FLASH GLUCOSE SENSOR (FREESTYLE DAKOTAH 14 DAY SENSOR) KIT    Use 1 sensor every 14 days to check blood glucose    INSULIN ASPART U-100 (NOVOLOG FLEXPEN U-100 INSULIN) 100 UNIT/ML (3 ML) INPN PEN    10 u AC + correction max TDD 45u    INSULIN DEGLUDEC (TRESIBA FLEXTOUCH U-200) 200 UNIT/ML (3 ML) INSULIN PEN    16 u qam    ISOSORBIDE MONONITRATE (IMDUR) 30 MG 24 HR TABLET    TAKE ONE TABLET BY MOUTH DAILY    LANCETS (ONETOUCH ULTRASOFT LANCETS) MISC    Use to test blood sugar 3 x's a day    LANSOPRAZOLE (PREVACID) 30 MG CAPSULE    Take 1 capsule (30 mg total) by mouth 2 (two) times a day.    LEVOTHYROXINE (SYNTHROID) 75 MCG TABLET    Take one tablet by mouth daily    LIDOCAINE (LIDODERM) 5 %    1 patch.    LORAZEPAM (ATIVAN) 1 MG TABLET    Take 1 mg by mouth every evening.    LOSARTAN (COZAAR) 50 MG TABLET    Take one tablet by mouth daily    MAGNESIUM OXIDE 500 MG TAB    Take 500 mg by mouth once daily.    METFORMIN (GLUCOPHAGE-XR) 500 MG ER 24HR TABLET    Take 1 tablet (500 mg total) by mouth 2 (two) times daily with meals.    METOPROLOL TARTRATE (LOPRESSOR) 25 MG TABLET    Take 0.5 tablets (12.5 mg total) by mouth 2 (two) times daily.    MULTIVITAMIN (THERAGRAN) TABLET    Take 1 tablet by mouth once daily.    NITROGLYCERIN (NITROSTAT) 0.4 MG SL TABLET    Place 1 tablet (0.4 mg total) under the tongue every 5 (five) minutes as needed for Chest pain.    ONDANSETRON (ZOFRAN) 4 MG TABLET    TAKE ONE TABLET BY MOUTH EVERY SIX HOURS    SUCRALFATE (CARAFATE) 1 GRAM TABLET    Take 1 tablet (1 g total) by mouth 4 (four) times daily before meals and nightly.    TACROLIMUS (PROGRAF) 1 MG CAP    Take 1 capsule (1 mg total) by mouth every 12 (twelve) hours.    TIZANIDINE (ZANAFLEX) 4 MG TABLET    Take 1 tablet by mouth every evening.    VILAZODONE (VIIBRYD) 40 MG TAB TABLET    Take 1 tablet (40 mg total) by mouth once daily.     VRAYLAR 3 MG CAP    Take 3 mg by mouth once daily.   Changed and/or Refilled Medications    Modified Medication Previous Medication    COLCHICINE, GOUT, (COLCRYS) 0.6 MG TABLET colchicine, gout, (COLCRYS) 0.6 mg tablet       Take 1 tablet (0.6 mg total) by mouth once daily.    Take 1 tablet (0.6 mg total) by mouth 2 (two) times daily.         Thank you for allowing me to participate in the care ofAndra Newell.    Disclaimer: This note was prepared using voice recognition system and is likely to have sound alike errors and is not proof read.  Please call me with any questions.

## 2023-11-08 RX ORDER — ROSUVASTATIN CALCIUM 20 MG/1
20 TABLET, COATED ORAL DAILY
COMMUNITY

## 2023-11-11 ENCOUNTER — HOSPITAL ENCOUNTER (OUTPATIENT)
Dept: RADIOLOGY | Facility: HOSPITAL | Age: 63
Discharge: HOME | End: 2023-11-11
Attending: FAMILY MEDICINE
Payer: MEDICARE

## 2023-11-11 DIAGNOSIS — Z12.31 ENCOUNTER FOR SCREENING MAMMOGRAM FOR MALIGNANT NEOPLASM OF BREAST: ICD-10-CM

## 2023-11-11 PROCEDURE — 77067 SCR MAMMO BI INCL CAD: CPT | Mod: 26,,, | Performed by: RADIOLOGY

## 2023-11-11 PROCEDURE — 77063 MAMMO DIGITAL SCREENING BILAT WITH TOMO: ICD-10-PCS | Mod: 26,,, | Performed by: RADIOLOGY

## 2023-11-11 PROCEDURE — 77063 BREAST TOMOSYNTHESIS BI: CPT | Mod: 26,,, | Performed by: RADIOLOGY

## 2023-11-11 PROCEDURE — 77067 SCR MAMMO BI INCL CAD: CPT | Mod: TC,PO

## 2023-11-11 PROCEDURE — 77067 MAMMO DIGITAL SCREENING BILAT WITH TOMO: ICD-10-PCS | Mod: 26,,, | Performed by: RADIOLOGY

## 2023-11-13 ENCOUNTER — TELEPHONE (OUTPATIENT)
Dept: ENDOCRINOLOGY | Facility: CLINIC | Age: 63
End: 2023-11-13
Payer: MEDICARE

## 2023-11-13 DIAGNOSIS — E03.9 ACQUIRED HYPOTHYROIDISM: Primary | Chronic | ICD-10-CM

## 2023-11-13 NOTE — TELEPHONE ENCOUNTER
TSH suppressed  Continue Synthroid 75 mcg Mon- Saturday, take 1/2 tablet Sunday  Repeat TSH in 6 weeks

## 2023-11-16 ENCOUNTER — PATIENT MESSAGE (OUTPATIENT)
Dept: ENDOCRINOLOGY | Facility: CLINIC | Age: 63
End: 2023-11-16
Payer: MEDICARE

## 2023-11-28 NOTE — TELEPHONE ENCOUNTER
Refill Routing Note   Medication(s) are not appropriate for processing by [facility] for the following reason(s):        Drug-disease interaction    [facility] action(s):  Defer        Medication Therapy Plan: isosorbide mononitrate and Chronic anemia; Anemia of chronic disease    Pharmacist review requested: Yes     Appointments  past 12m or future 3m with PCP    Date Provider   Last Visit   10/12/2023 Fady Vaughn MD   Next Visit   1/17/2024 Fady Vaughn MD   ED visits in past 90 days: 0        Note composed:5:50 PM 11/28/2023

## 2023-11-28 NOTE — TELEPHONE ENCOUNTER
No care due was identified.  [facility] Embedded Care Due Messages. Reference number: [id number].   11/28/2023 3:22:25 PM CST

## 2023-11-28 NOTE — TELEPHONE ENCOUNTER
Refill Routing Note   Medication(s) are not appropriate for processing by [facility] for the following reason(s):        Drug-disease interaction    [facility] action(s):  Defer        Medication Therapy Plan: isosorbide mononitrate and Chronic anemia; Anemia of chronic disease    Pharmacist review requested: Yes     Appointments  past 12m or future 3m with PCP    Date Provider   Last Visit   10/12/2023 Fady Vaughn MD   Next Visit   1/17/2024 Fady Vaughn MD   ED visits in past 90 days: 0        Note composed:4:06 PM 11/28/2023

## 2023-11-29 RX ORDER — ISOSORBIDE MONONITRATE 30 MG/1
30 TABLET, EXTENDED RELEASE ORAL
Qty: 90 TABLET | Refills: 3 | Status: SHIPPED | OUTPATIENT
Start: 2023-11-29

## 2023-12-06 ENCOUNTER — TELEPHONE (OUTPATIENT)
Dept: CARDIOLOGY | Facility: CLINIC | Age: 63
End: 2023-12-06
Payer: MEDICARE

## 2023-12-06 NOTE — TELEPHONE ENCOUNTER
Dr. Ruben Nunez  Procedure: Intrathecal pump Explant   Med: Plavix, Aspirin  Phone# [phone]  Fax# [phone]

## 2023-12-06 NOTE — TELEPHONE ENCOUNTER
----- Message from Angelia Manuela sent at 12/6/2023  9:50 AM CST -----  Regarding: Medical Release Form  Contact: [facility]  Needs Medical Advice      Who Called: Matilda         Would the patient rather a call back or a response via [facility]? Call back     Best Call Back Number: [phone]    Additional Information: Sts needing this form for oral surgeon and Needing form back as soon as possible. Will not be able to schedule the pt until then. Fax# [phone]. Personal Email: [email or web address]  Please Advise - Thank you

## 2023-12-08 DIAGNOSIS — Z94.0 KIDNEY REPLACED BY TRANSPLANT: Primary | ICD-10-CM

## 2023-12-14 ENCOUNTER — TELEPHONE (OUTPATIENT)
Dept: CARDIOLOGY | Facility: CLINIC | Age: 63
End: 2023-12-14
Payer: MEDICARE

## 2023-12-14 NOTE — TELEPHONE ENCOUNTER
----- Message from Marielle Joyner sent at 12/14/2023  1:33 PM CST -----  Contact: Steph from [facility]  Type:  Needs Medical Advice    Who Called:   Steph from [facility]    Would the patient rather a call back or a response via [facility]?   Call back  Best Call Back Number:   [phone] - Steph    Additional Information:   States she would like to speak with someone - states she is following up on the status of a medial release form they are waiting on - please call - thank you

## 2023-12-18 ENCOUNTER — PATIENT MESSAGE (OUTPATIENT)
Dept: GASTROENTEROLOGY | Facility: CLINIC | Age: 63
End: 2023-12-18
Payer: MEDICARE

## 2023-12-18 ENCOUNTER — TELEPHONE (OUTPATIENT)
Dept: RHEUMATOLOGY | Facility: CLINIC | Age: 63
End: 2023-12-18
Payer: MEDICARE

## 2023-12-18 NOTE — TELEPHONE ENCOUNTER
"----- Message from Melecio Joseph sent at 12/18/2023 10:02 AM CST -----  Pt would like a call back in regards to rescheduling the following;      Appt Date: 1/9       Type of appt: EP       Physician: Dr HOLGUIN       Reason for rescheduling?  Appt time no longer works       Caller: Self       Contact Preference: .Telephone Information:  [facility]          [phone]           Additional Information:Pt has conflict in scheduling and would like a sooner appt if possible (first available 4/28)          "Thank you for all that you do for our patients"    "

## 2023-12-18 NOTE — TELEPHONE ENCOUNTER
Left message for patient to return call to assist in rescheduling or she can RS through My Chart, his next available will not be until March/April if she cannot keep the 1/9 appt

## 2023-12-27 ENCOUNTER — LAB VISIT (OUTPATIENT)
Dept: LAB | Facility: HOSPITAL | Age: 63
End: 2023-12-27
Attending: NURSE PRACTITIONER
Payer: MEDICARE

## 2023-12-27 ENCOUNTER — TELEPHONE (OUTPATIENT)
Dept: CARDIOLOGY | Facility: CLINIC | Age: 63
End: 2023-12-27
Payer: MEDICARE

## 2023-12-27 DIAGNOSIS — E03.9 ACQUIRED HYPOTHYROIDISM: Chronic | ICD-10-CM

## 2023-12-27 LAB — TSH SERPL DL<=0.005 MIU/L-ACNC: 0.49 UIU/ML (ref 0.4–4)

## 2023-12-27 PROCEDURE — 36415 COLL VENOUS BLD VENIPUNCTURE: CPT | Mod: PO | Performed by: NURSE PRACTITIONER

## 2023-12-27 PROCEDURE — 84443 ASSAY THYROID STIM HORMONE: CPT | Performed by: NURSE PRACTITIONER

## 2023-12-27 NOTE — TELEPHONE ENCOUNTER
[facility] ([place], [place])  Phone: [phone]  Fax: [phone]  Procedure: Extractions  May Include Sedation or general anesthesia  Medication: Aspirin, Plavix

## 2023-12-28 ENCOUNTER — PATIENT MESSAGE (OUTPATIENT)
Dept: CARDIOLOGY | Facility: CLINIC | Age: 63
End: 2023-12-28
Payer: MEDICARE

## 2023-12-28 ENCOUNTER — PATIENT MESSAGE (OUTPATIENT)
Dept: ENDOCRINOLOGY | Facility: CLINIC | Age: 63
End: 2023-12-28
Payer: MEDICARE

## 2023-12-29 ENCOUNTER — OFFICE VISIT (OUTPATIENT)
Dept: PAIN MEDICINE | Facility: CLINIC | Age: 63
End: 2023-12-29
Payer: MEDICARE

## 2023-12-29 VITALS
BODY MASS INDEX: 28.22 KG/M2 | HEART RATE: 76 BPM | HEIGHT: 66 IN | WEIGHT: 175.63 LBS | DIASTOLIC BLOOD PRESSURE: 80 MMHG | SYSTOLIC BLOOD PRESSURE: 140 MMHG

## 2023-12-29 DIAGNOSIS — M54.50 CHRONIC BILATERAL LOW BACK PAIN WITHOUT SCIATICA: ICD-10-CM

## 2023-12-29 DIAGNOSIS — Z98.890 HISTORY OF LUMBAR SURGERY: Primary | ICD-10-CM

## 2023-12-29 DIAGNOSIS — G89.29 CHRONIC BILATERAL LOW BACK PAIN WITHOUT SCIATICA: ICD-10-CM

## 2023-12-29 PROCEDURE — 1157F ADVNC CARE PLAN IN RCRD: CPT | Mod: CPTII,S$GLB,, | Performed by: PHYSICIAN ASSISTANT

## 2023-12-29 PROCEDURE — 4010F PR ACE/ARB THEARPY RXD/TAKEN: ICD-10-PCS | Mod: CPTII,S$GLB,, | Performed by: PHYSICIAN ASSISTANT

## 2023-12-29 PROCEDURE — 3066F PR DOCUMENTATION OF TREATMENT FOR NEPHROPATHY: ICD-10-PCS | Mod: CPTII,S$GLB,, | Performed by: PHYSICIAN ASSISTANT

## 2023-12-29 PROCEDURE — 3061F NEG MICROALBUMINURIA REV: CPT | Mod: CPTII,S$GLB,, | Performed by: PHYSICIAN ASSISTANT

## 2023-12-29 PROCEDURE — 3066F NEPHROPATHY DOC TX: CPT | Mod: CPTII,S$GLB,, | Performed by: PHYSICIAN ASSISTANT

## 2023-12-29 PROCEDURE — 3288F FALL RISK ASSESSMENT DOCD: CPT | Mod: CPTII,S$GLB,, | Performed by: PHYSICIAN ASSISTANT

## 2023-12-29 PROCEDURE — 3079F PR MOST RECENT DIASTOLIC BLOOD PRESSURE 80-89 MM HG: ICD-10-PCS | Mod: CPTII,S$GLB,, | Performed by: PHYSICIAN ASSISTANT

## 2023-12-29 PROCEDURE — 1160F PR REVIEW ALL MEDS BY PRESCRIBER/CLIN PHARMACIST DOCUMENTED: ICD-10-PCS | Mod: CPTII,S$GLB,, | Performed by: PHYSICIAN ASSISTANT

## 2023-12-29 PROCEDURE — 1159F PR MEDICATION LIST DOCUMENTED IN MEDICAL RECORD: ICD-10-PCS | Mod: CPTII,S$GLB,, | Performed by: PHYSICIAN ASSISTANT

## 2023-12-29 PROCEDURE — 1101F PR PT FALLS ASSESS DOC 0-1 FALLS W/OUT INJ PAST YR: ICD-10-PCS | Mod: CPTII,S$GLB,, | Performed by: PHYSICIAN ASSISTANT

## 2023-12-29 PROCEDURE — 99203 OFFICE O/P NEW LOW 30 MIN: CPT | Mod: S$GLB,,, | Performed by: PHYSICIAN ASSISTANT

## 2023-12-29 PROCEDURE — 3061F PR NEG MICROALBUMINURIA RESULT DOCUMENTED/REVIEW: ICD-10-PCS | Mod: CPTII,S$GLB,, | Performed by: PHYSICIAN ASSISTANT

## 2023-12-29 PROCEDURE — 99999 PR PBB SHADOW E&M-EST. PATIENT-LVL IV: CPT | Mod: PBBFAC,,, | Performed by: PHYSICIAN ASSISTANT

## 2023-12-29 PROCEDURE — 1101F PT FALLS ASSESS-DOCD LE1/YR: CPT | Mod: CPTII,S$GLB,, | Performed by: PHYSICIAN ASSISTANT

## 2023-12-29 PROCEDURE — 99203 PR OFFICE/OUTPT VISIT, NEW, LEVL III, 30-44 MIN: ICD-10-PCS | Mod: S$GLB,,, | Performed by: PHYSICIAN ASSISTANT

## 2023-12-29 PROCEDURE — 1160F RVW MEDS BY RX/DR IN RCRD: CPT | Mod: CPTII,S$GLB,, | Performed by: PHYSICIAN ASSISTANT

## 2023-12-29 PROCEDURE — 3288F PR FALLS RISK ASSESSMENT DOCUMENTED: ICD-10-PCS | Mod: CPTII,S$GLB,, | Performed by: PHYSICIAN ASSISTANT

## 2023-12-29 PROCEDURE — 3008F PR BODY MASS INDEX (BMI) DOCUMENTED: ICD-10-PCS | Mod: CPTII,S$GLB,, | Performed by: PHYSICIAN ASSISTANT

## 2023-12-29 PROCEDURE — 3008F BODY MASS INDEX DOCD: CPT | Mod: CPTII,S$GLB,, | Performed by: PHYSICIAN ASSISTANT

## 2023-12-29 PROCEDURE — 1159F MED LIST DOCD IN RCRD: CPT | Mod: CPTII,S$GLB,, | Performed by: PHYSICIAN ASSISTANT

## 2023-12-29 PROCEDURE — 3051F HG A1C>EQUAL 7.0%<8.0%: CPT | Mod: CPTII,S$GLB,, | Performed by: PHYSICIAN ASSISTANT

## 2023-12-29 PROCEDURE — 1157F PR ADVANCE CARE PLAN OR EQUIV PRESENT IN MEDICAL RECORD: ICD-10-PCS | Mod: CPTII,S$GLB,, | Performed by: PHYSICIAN ASSISTANT

## 2023-12-29 PROCEDURE — 3079F DIAST BP 80-89 MM HG: CPT | Mod: CPTII,S$GLB,, | Performed by: PHYSICIAN ASSISTANT

## 2023-12-29 PROCEDURE — 4010F ACE/ARB THERAPY RXD/TAKEN: CPT | Mod: CPTII,S$GLB,, | Performed by: PHYSICIAN ASSISTANT

## 2023-12-29 PROCEDURE — 99999 PR PBB SHADOW E&M-EST. PATIENT-LVL IV: ICD-10-PCS | Mod: PBBFAC,,, | Performed by: PHYSICIAN ASSISTANT

## 2023-12-29 PROCEDURE — 3077F SYST BP >= 140 MM HG: CPT | Mod: CPTII,S$GLB,, | Performed by: PHYSICIAN ASSISTANT

## 2023-12-29 PROCEDURE — 3077F PR MOST RECENT SYSTOLIC BLOOD PRESSURE >= 140 MM HG: ICD-10-PCS | Mod: CPTII,S$GLB,, | Performed by: PHYSICIAN ASSISTANT

## 2023-12-29 PROCEDURE — 3051F PR MOST RECENT HEMOGLOBIN A1C LEVEL 7.0 - < 8.0%: ICD-10-PCS | Mod: CPTII,S$GLB,, | Performed by: PHYSICIAN ASSISTANT

## 2023-12-29 RX ORDER — HYDROCODONE BITARTRATE AND ACETAMINOPHEN 10; 325 MG/1; MG/1
1 TABLET ORAL
COMMUNITY

## 2023-12-29 NOTE — PROGRESS NOTES
[facility] New Patient Evaluation      Referred by: Aaareferral Self    PCP:  Fady Vaughn MD    CC:   Chief Complaint   Patient presents with    Back Pain          HPI:   Andra Newell is a 63 y.o. year old female patient who has a past medical history of Anemia, Anemia in chronic kidney disease, on chronic dialysis, Anxiety and depression, Aplastic anemia with parvovirus B19 infection, Aplastic anemia with parvovirus B19 infection 4/2019, Arthritis, Asthma, CKD stage III (moderate), Colon polyp, Depression, Diabetes mellitus, Diverticulosis, Encounter for blood transfusion, Eosinophilia, ESRD (end stage renal disease), ESRD on dialysis, GERD (gastroesophageal reflux disease), Gout, Gout, Hyperlipidemia, Hypertension, Hypertriglyceridemia without hypercholesterolemia, Low back pain, Low HDL (under 40), MRSA carrier, Neutropenia, unspecified, Obesity, Renal disorder, Rotator cuff syndrome--left, Thyroid disease, Ulnar neuropathy of right upper extremity, and Uncontrolled type 2 diabetes mellitus with hyperglycemia. She presents in self referral for lower back pain.    She has had cervical spine fusion in 2021 by Gurdeep Vera.  She had lumbar spine surgery x3 in the late 1980s/ early 1990s.  She has had chronic back pain.  In recent years, she has followed with Dr. Mukesh Nunez.  He placed fentanyl pain pump 3/23/22.  She did well with the pump in regards to pain control.  But when she started to sleep more during the day there was concern for possible use of too much fentanyl per patient.  Dr. Nunez is no longer monitoring pain pumps or prescribing medications for patients.  He removed the pain pump 12-15-23 and Ms. Silverman has not had any further follow up with his office.  Her last lumbar MRI was years ago.  She had IVETH and nerve procedures prior to placment of the pain pump.  With removeal of the pain pump, she has been taking norco for pain control.  Over the last 2 days she has started to  "feel returnof lower back pain she had prior to pump placement.  Felicitas nis felt in the mid to lower thoracic region to the lower lumbar region bilaterally.  She denies any radicular leg pain, numbness or tingling.     Denies bowel/ bladder incontinence.    Past and current medications:  Antineuropathics:  NSAIDs:  Antidepressants:  Muscle relaxers:  Opioids:  norco; (fentanyl felicitas npump placed and removed)  Antiplatelets/Anticoagulants:  ASA, plavix  Others:  tylenol, lidoderm patch    Physical Therapy/ Chiropractic care:  PT - in the past with last trial years ago    Pain Intervention History:  IVETH and nerve block procedures with Dr. Ferrer in 2019 and after with Dr. Nunez with last one prior to placement of pain pump; she does not describe any significant relief with procedures.     Past Spine Surgical History:  Cervical spine fusion 2021 with Dr. Vera  lumbar spine surgery x3 in the late 1980s/ early 1990s.        History:    Current Outpatient Medications:     acetaminophen (TYLENOL) 650 MG TbSR, Take 650 mg by mouth every 8 (eight) hours., Disp: , Rfl:     allopurinoL (ZYLOPRIM) 100 MG tablet, Take 1 tablet (100 mg total) by mouth once daily., Disp: 90 tablet, Rfl: 1    amLODIPine (NORVASC) 5 MG tablet, Take 1 tablet by mouth once daily., Disp: , Rfl:     aspirin (ECOTRIN) 81 MG EC tablet, Take 1 tablet (81 mg total) by mouth once daily., Disp: 30 tablet, Rfl: 0    BD LENIN 2ND GEN PEN NEEDLE 32 gauge x 5/32" Ndle, Use up to four daily to inject novolog, Disp: 400 each, Rfl: 3    blood sugar diagnostic Strp, Check glucose 3 (three) times daily., Disp: 300 each, Rfl: PRN    blood-glucose sensor (DEXCOM G7 SENSOR) Amparo, Change every 10 days, Disp: 9 each, Rfl: 4    clopidogreL (PLAVIX) 75 mg tablet, Take one tablet by mouth daily, Disp: 90 tablet, Rfl: 0    colchicine, gout, (COLCRYS) 0.6 mg tablet, Take 1 tablet (0.6 mg total) by mouth once daily., Disp: 90 tablet, Rfl: 3    DEXCOM G7  Misc, Dispense 1, Disp: " 1 each, Rfl: 0    diazePAM (VALIUM) 5 MG tablet, Take 1 tablet (5 mg total) by mouth every 6 (six) hours as needed for Anxiety., Disp: 1 tablet, Rfl: 0    doxazosin (CARDURA) 2 MG tablet, Take 2 mg by mouth once daily., Disp: , Rfl:     DULoxetine (CYMBALTA) 30 MG capsule, Take 30 mg by mouth once daily., Disp: , Rfl:     famotidine (PEPCID) 40 MG tablet, Take 1 tablet (40 mg total) by mouth every evening., Disp: 30 tablet, Rfl: 6    flash glucose sensor (FREESTYLE DAKOTAH 14 DAY SENSOR) Kit, Use 1 sensor every 14 days to check blood glucose, Disp: 6 kit, Rfl: 3    HYDROcodone-acetaminophen (NORCO)  mg per tablet, Take 1 tablet by mouth every 24 hours as needed for Pain., Disp: , Rfl:     hydroxychloroquine (PLAQUENIL) 200 mg tablet, Take 1 tablet (200 mg total) by mouth 2 (two) times daily., Disp: 60 tablet, Rfl: 6    insulin aspart U-100 (NOVOLOG FLEXPEN U-100 INSULIN) 100 unit/mL (3 mL) InPn pen, 10 u AC + correction max TDD 45u, Disp: 30 mL, Rfl: 4    insulin degludec (TRESIBA FLEXTOUCH U-200) 200 unit/mL (3 mL) insulin pen, 16 u qam, Disp: 2 pen , Rfl: 6    isosorbide mononitrate (IMDUR) 30 MG 24 hr tablet, TAKE 1 TABLET BY MOUTH ONCE DAILY, Disp: 90 tablet, Rfl: 3    lancets (ONETOUCH ULTRASOFT LANCETS) Misc, Use to test blood sugar 3 x's a day, Disp: 300 each, Rfl: 3    lansoprazole (PREVACID) 30 MG capsule, Take 1 capsule (30 mg total) by mouth 2 (two) times a day., Disp: 60 capsule, Rfl: 1    levothyroxine (SYNTHROID) 75 MCG tablet, Take one tablet by mouth daily, Disp: 90 tablet, Rfl: 3    LIDOcaine (LIDODERM) 5 %, 1 patch., Disp: , Rfl:     LORazepam (ATIVAN) 1 MG tablet, Take 1 mg by mouth every evening., Disp: , Rfl:     losartan (COZAAR) 50 MG tablet, Take one tablet by mouth daily, Disp: 90 tablet, Rfl: 3    magnesium oxide 500 mg Tab, Take 500 mg by mouth once daily., Disp: , Rfl: 0    metFORMIN (GLUCOPHAGE-XR) 500 MG ER 24hr tablet, Take 1 tablet (500 mg total) by mouth 2 (two) times daily with  meals., Disp: 180 tablet, Rfl: 1    metoprolol tartrate (LOPRESSOR) 25 MG tablet, Take 0.5 tablets (12.5 mg total) by mouth 2 (two) times daily., Disp: 30 tablet, Rfl: 1    multivitamin (THERAGRAN) tablet, Take 1 tablet by mouth once daily., Disp: , Rfl:     nitroGLYCERIN (NITROSTAT) 0.4 MG SL tablet, Place 1 tablet (0.4 mg total) under the tongue every 5 (five) minutes as needed for Chest pain., Disp: 20 tablet, Rfl: 1    ondansetron (ZOFRAN) 4 MG tablet, TAKE ONE TABLET BY MOUTH EVERY SIX HOURS, Disp: 20 tablet, Rfl: 1    rosuvastatin (CRESTOR) 20 MG tablet, Take 20 mg by mouth once daily., Disp: , Rfl:     sucralfate (CARAFATE) 1 gram tablet, Take 1 tablet (1 g total) by mouth 4 (four) times daily before meals and nightly., Disp: 120 tablet, Rfl: 3    tacrolimus (PROGRAF) 1 MG Cap, Take 1 capsule (1 mg total) by mouth every 12 (twelve) hours. (Patient taking differently: Take 1 mg by mouth 2 (two) times a day.), Disp: 60 capsule, Rfl: 11    tiZANidine (ZANAFLEX) 4 MG tablet, Take 1 tablet by mouth every evening., Disp: , Rfl:     vilazodone (VIIBRYD) 40 mg Tab tablet, Take 1 tablet (40 mg total) by mouth once daily., Disp: 30 tablet, Rfl: 4    VRAYLAR 3 mg Cap, Take 3 mg by mouth once daily., Disp: , Rfl:     Past Medical History:   Diagnosis Date    Anemia     Anemia in chronic kidney disease, on chronic dialysis 7/12/2017    Anxiety and depression     Aplastic anemia with parvovirus B19 infection 5/27/2019    Aplastic anemia with parvovirus B19 infection 4/2019 5/27/2019    Arthritis     Asthma     allergic airway    CKD stage III (moderate) 2/18/2019    Colon polyp     Depression     Diabetes mellitus     Diverticulosis     Encounter for blood transfusion     Eosinophilia     ESRD (end stage renal disease)     stage V, due for living donor 9/2018    ESRD on dialysis     GERD (gastroesophageal reflux disease)     Gout     Gout     Hyperlipidemia     Hypertension     Hypertriglyceridemia without  "hypercholesterolemia 6/9/2019    Low back pain     Low HDL (under 40) 6/9/2019    MRSA carrier     Neutropenia, unspecified 5/8/2019    Obesity     Renal disorder     Rotator cuff syndrome--left     Thyroid disease     Ulnar neuropathy of right upper extremity     Uncontrolled type 2 diabetes mellitus with hyperglycemia        Past Surgical History:   Procedure Laterality Date    ACHILLES TENDON SURGERY Left 05/2015    ANGIOGRAM, CORONARY, WITH LEFT HEART CATHETERIZATION N/A 7/10/2023    Procedure: Angiogram, Coronary, with Left Heart Cath;  Surgeon: Ajay Hunter MD;  Location: [facility] CATH/EP LAB;  Service: Cardiology;  Laterality: N/A;    BACK SURGERY      CERVICAL SPINE SURGERY  2021    cervical gate placed by Dr. Vera- screws,plate, and "gate"    CHOLECYSTECTOMY      COLONOSCOPY N/A 09/06/2017    Procedure: COLONOSCOPY;  Surgeon: Marisol Bryson MD;  Location: [facility];  Service: Endoscopy;  Laterality: N/A; REPEAT IN 3 YEARS FOR SURVEILLANCE    COLONOSCOPY N/A 05/09/2019    Procedure: COLONOSCOPY;  Surgeon: Fady Ewing MD;  Location: [facility] ([address] [place] [address]);  Service: Endoscopy;  Laterality: N/A;    CORONARY ANGIOGRAPHY  08/31/2022    Procedure: ANGIOGRAM, CORONARY ARTERY;  Surgeon: Christoph Dang MD;  Location: [facility] CATH;  Service: Cardiology;;    DIALYSIS FISTULA CREATION  12/2017    ESOPHAGOGASTRODUODENOSCOPY N/A 05/09/2019    Procedure: EGD (ESOPHAGOGASTRODUODENOSCOPY);  Surgeon: Fady Ewing MD;  [facility]  Service: Endoscopy;  Laterality: N/A;    ESOPHAGOGASTRODUODENOSCOPY N/A 06/10/2021    Procedure: EGD (ESOPHAGOGASTRODUODENOSCOPY);  Surgeon: Marisol Bryson MD;  Location: [facility];  Service: Endoscopy;  Laterality: N/A;    ESOPHAGOGASTRODUODENOSCOPY N/A 5/31/2023    Procedure: EGD (ESOPHAGOGASTRODUODENOSCOPY);  Surgeon: Marisol Bryson MD;  Location: [facility] ENDO;  Service: Endoscopy;  Laterality: N/A;    FRACTURE SURGERY  1990    Ankle knee    HEMORRHOID SURGERY "      INJECTION OF ANESTHETIC AGENT AROUND MEDIAL BRANCH NERVES INNERVATING LUMBAR FACET JOINT Right 09/25/2019    Procedure: Block-nerve-medial branch-lumbar;  Surgeon: Yonny Ferrer MD;  Location: [facility] OR;  Service: Pain Management;  Laterality: Right;  L2, 3, 4,5     INJECTION OF ANESTHETIC AGENT AROUND MEDIAL BRANCH NERVES INNERVATING LUMBAR FACET JOINT Right 10/16/2019    Procedure: Block-nerve-medial branch-lumbar;  Surgeon: Yonny Ferrer MD;  Location: [facility] OR;  Service: Pain Management;  Laterality: Right;  L2, 3, 4,5     JOINT REPLACEMENT      left    KIDNEY TRANSPLANT N/A 01/14/2019    Procedure: TRANSPLANT, KIDNEY;  Surgeon: Roland Salazar MD;  [facility];  Service: Transplant;  Laterality: N/A;    KNEE SURGERY      RADIOFREQUENCY ABLATION OF LUMBAR MEDIAL BRANCH NERVE AT SINGLE LEVEL Right 10/29/2019    Procedure: Radiofrequency Ablation, Nerve, Spinal, Lumbar, Medial Branch, 1 Level;  Surgeon: Yonny Ferrer MD;  Location: [facility] OR;  Service: Pain Management;  Laterality: Right;  L2, 3, 4, 5  burned at 80 degrees C X 60 seconds each site X 2    SHOULDER ARTHROSCOPY      SHOULDER SURGERY      SPINE SURGERY  2020    Pain pump in back    UPPER GASTROINTESTINAL ENDOSCOPY  ~2013     [facility]       Family History   Problem Relation Age of Onset    Diabetes Mother     Alzheimer's disease Mother     Thyroid disease Mother     Hyperlipidemia Mother     Arthritis Mother     Depression Mother     Heart disease Mother     Heart disease Father     Stroke Father     Diabetes Sister     Lupus Sister     Ovarian cancer Sister     Mental illness Sister     COPD Maternal Aunt     Diabetes Maternal Aunt     Heart disease Maternal Aunt     Hypertension Maternal Aunt     No Known Problems Maternal Uncle     No Known Problems Paternal Aunt     No Known Problems Paternal Uncle     Diabetes Maternal Grandmother     Hypertension Maternal Grandmother     Kidney disease Maternal Grandmother     No Known Problems Maternal  Grandfather     No Known Problems Paternal Grandmother     No Known Problems Paternal Grandfather     Heart disease Brother     No Known Problems Son     No Known Problems Other     Colon cancer Neg Hx     Colon polyps Neg Hx     Crohn's disease Neg Hx     Ulcerative colitis Neg Hx     Celiac disease Neg Hx     Esophageal cancer Neg Hx     Stomach cancer Neg Hx     Breast cancer Neg Hx     BRCA 1/2 Neg Hx        Social History     Socioeconomic History    Marital status: Significant Other     Spouse name: Any Alvarez    Number of children: 2    Highest education level: 12th grade   Occupational History    Occupation: retired     Comment:    Tobacco Use    Smoking status: Never    Smokeless tobacco: Never   Substance and Sexual Activity    Alcohol use: No     Alcohol/week: 0.0 standard drinks of alcohol    Drug use: No    Sexual activity: Yes     Partners: Female   Social History Narrative     female    Disabled since 2015 due to DDD and severe osteoarthritis of knee and hips    Previously worked as  at "[employer]"    Lives in residential [address] with partner, Any.     Has 2 boys, 1 lives in [place], 1 in [place].         Restoration: Christian    Hobbies: painting and crafts.       Social Determinants of Health     Financial Resource Strain: Low Risk  (10/12/2023)    Overall Financial Resource Strain (CARDIA)     Difficulty of Paying Living Expenses: Not hard at all   Food Insecurity: No Food Insecurity (10/12/2023)    Hunger Vital Sign     Worried About Running Out of Food in the Last Year: Never true     Ran Out of Food in the Last Year: Never true   Transportation Needs: No Transportation Needs (10/12/2023)    PRAPARE - Transportation     Lack of Transportation (Medical): No     Lack of Transportation (Non-Medical): No   Physical Activity: Inactive (10/12/2023)    Exercise Vital Sign     Days of Exercise per Week: 0 days     Minutes of Exercise per  "Session: 0 min   Stress: Stress Concern Present (10/12/2023)    [employer] [place] - Occupational Stress Questionnaire     Feeling of Stress : Rather much   Social Connections: Moderately Isolated (10/12/2023)    Social Connection and Isolation Panel [NHANES]     Frequency of Communication with Friends and Family: More than three times a week     Frequency of Social Gatherings with Friends and Family: Twice a week     Attends Yazidi Services: Never     Active Member of Clubs or Organizations: No     Attends Club or Organization Meetings: Never     Marital Status: Living with partner   Housing Stability: Low Risk  (10/12/2023)    Housing Stability Vital Sign     Unable to Pay for Housing in the Last Year: No     Number of Places Lived in the Last Year: 1     Unstable Housing in the Last Year: No       Review of patient's allergies indicates:   Allergen Reactions    Dilaudid [hydromorphone] Itching    Morphine Itching    Torsemide Diarrhea     Diarrhea stopped once discontinued med    Codeine Itching     Can take oxycodone and hydrocodone with Benadryl       Labs:  Lab Results   Component Value Date    HGBA1C 7.6 (H) 11/07/2023       Lab Results   Component Value Date    WBC 4.83 08/22/2023    HGB 11.1 (L) 08/22/2023    HCT 36.7 (L) 08/22/2023    MCV 90 08/22/2023     08/22/2023           Review of Systems:  Low back pain.  Balance of review of systems is negative.    Physical Exam:  Vitals:    12/29/23 0949   BP: (!) 140/80   Pulse: 76   Weight: 79.6 kg (175 lb 9.5 oz)   Height: 5' 6" (1.676 m)   PainSc:   7   PainLoc: Back     Body mass index is 28.34 kg/m².    Pain disability index:      11/11/2019     3:04 PM   Last 3 PDI Scores   Pain Disability Index (PDI) 46       Gen: NAD  Psych: mood appropriate for given condition  HEENT: eyes anicteric   CV: RRR, 2+ radial pulse  Respiratory: non-labored, no signs of respiratory distress  Abd: non-distended  Skin: warm, dry and intact.  Gait: " Able to heel walk, toe walk. No antalgic gait.     Coordination:   Tandem walking coordination: normal    Cervical spine: ROM is full in flexion, extension and lateral rotation without increased pain.  Spurling's maneuver causes no neck pain to either side.  Myofascial exam: No Tenderness to palpation across cervical paraspinous region bilaterally.    Lumbar spine:  Lumbar spine: ROM is full with flexion extension and oblique extension with no increased pain.    Yogi's test causes no increased pain on either side.    Supine straight leg raise is negative bilaterally.    Internal and external rotation of the hip causes no increased pain on either side.  Myofascial exam: No tenderness to palpation across lumbar paraspinous muscles. No tenderness to palpation over the bilateral greater trochanters and bilateral SI joint    Sensory:  Intact and symmetrical to light touch in C4-T1 dermatomes bilaterally. Intact and symmetrical to light touch in L1-S1 dermatomes bilaterally.    Motor:    Right Left   C4 Shoulder Abduction  5  5   C5 Elbow Flexion    5  5   C6 Wrist Extension  5  5   C7 Elbow Extension   5  5   C8/T1 Hand Intrinsics   5  5        Right Left   L2/3 Iliacus Hip flexion  5  5   L3/4 Qudratus Femoris Knee Extension  5  5   L4/5 Tib Anterior Ankle Dorsiflexion   5  5   L5/S1 Extensor Hallicus Longus Great toe extension  5  5   S1/S2 Gastroc/Soleus Plantar Flexion  5  5      Right Left   Triceps DTR 2+ 2+   Biceps DTR 2+ 2+   Brachioradialis DTR 2+ 2+   Patellar DTR 2+ 2+   Achilles DTR 2+ 2+   Guzman Absent  Absent   Clonus Absent Absent   Babinski Absent Absent       Imaging:  No spine imaging to review.      Assessment:   Andra Newell is a 63 y.o. year old female patient who has a past medical history of Anemia, Anemia in chronic kidney disease, on chronic dialysis, Anxiety and depression, Aplastic anemia with parvovirus B19 infection, Aplastic anemia with parvovirus B19 infection 4/2019,  Arthritis, Asthma, CKD stage III (moderate), Colon polyp, Depression, Diabetes mellitus, Diverticulosis, Encounter for blood transfusion, Eosinophilia, ESRD (end stage renal disease), ESRD on dialysis, GERD (gastroesophageal reflux disease), Gout, Gout, Hyperlipidemia, Hypertension, Hypertriglyceridemia without hypercholesterolemia, Low back pain, Low HDL (under 40), MRSA carrier, Neutropenia, unspecified, Obesity, Renal disorder, Rotator cuff syndrome--left, Thyroid disease, Ulnar neuropathy of right upper extremity, and Uncontrolled type 2 diabetes mellitus with hyperglycemia. She presents in self referral for back pain.    Pain can be myofascial, from scar tissue from prior surgeries, and/ or arthrtic in nature.  No current radiculopathy and no neurological deficits.     Plan:  - discussed options to help with pain include exercise, PT, acupuncture, futher interventional procedures, surgery.  - we could obtain updated imaging to consdier interventional procedures/ surgery.  She is not interested in either.  She does not wan to try PT as it did not help in the past.  - she would like to continue with medication management.  I explained to her and family member that our physcians are interventional pain specialists and not strictly medication management.  If she is not interested in trying to come off the narcotics and try procedures, we would need her to see a medication management physician.  I provided her a list of medication management physicains in the area.  She was understanding and seemed pleased with the visit today.    Problem List Items Addressed This Visit       Bilateral low back pain without sciatica (Chronic)     Other Visit Diagnoses       History of lumbar surgery    -  Primary            Follow Up: RTC as needed    : Reviewed and consistent with medication use as prescribed.    Thank you for referring this interesting patient, and I look forward to continuing to collaborate in her  care.        Maite Alfonso PA-C  [facility]

## 2024-01-02 ENCOUNTER — TELEPHONE (OUTPATIENT)
Dept: PRIMARY CARE CLINIC | Facility: CLINIC | Age: 64
End: 2024-01-02
Payer: MEDICARE

## 2024-01-02 ENCOUNTER — OFFICE VISIT (OUTPATIENT)
Dept: URGENT CARE | Facility: CLINIC | Age: 64
End: 2024-01-02
Payer: MEDICARE

## 2024-01-02 ENCOUNTER — NURSE TRIAGE (OUTPATIENT)
Dept: ADMINISTRATIVE | Facility: CLINIC | Age: 64
End: 2024-01-02
Payer: MEDICARE

## 2024-01-02 VITALS
WEIGHT: 175 LBS | HEART RATE: 88 BPM | BODY MASS INDEX: 28.12 KG/M2 | TEMPERATURE: 98 F | SYSTOLIC BLOOD PRESSURE: 148 MMHG | RESPIRATION RATE: 20 BRPM | HEIGHT: 66 IN | OXYGEN SATURATION: 97 % | DIASTOLIC BLOOD PRESSURE: 84 MMHG

## 2024-01-02 DIAGNOSIS — R09.81 SINUS CONGESTION: ICD-10-CM

## 2024-01-02 DIAGNOSIS — R09.82 POSTNASAL DRIP: ICD-10-CM

## 2024-01-02 DIAGNOSIS — B96.89 BACTERIAL PHARYNGITIS: Primary | ICD-10-CM

## 2024-01-02 DIAGNOSIS — J02.9 SORE THROAT: ICD-10-CM

## 2024-01-02 DIAGNOSIS — J02.8 BACTERIAL PHARYNGITIS: Primary | ICD-10-CM

## 2024-01-02 LAB
CTP QC/QA: YES
MOLECULAR STREP A: NEGATIVE

## 2024-01-02 PROCEDURE — 87651 STREP A DNA AMP PROBE: CPT | Mod: QW,S$GLB,, | Performed by: PHYSICIAN ASSISTANT

## 2024-01-02 PROCEDURE — 99213 OFFICE O/P EST LOW 20 MIN: CPT | Mod: S$GLB,,, | Performed by: PHYSICIAN ASSISTANT

## 2024-01-02 RX ORDER — FLUTICASONE PROPIONATE 50 MCG
1 SPRAY, SUSPENSION (ML) NASAL 2 TIMES DAILY
Qty: 16 ML | Refills: 3 | Status: SHIPPED | OUTPATIENT
Start: 2024-01-02

## 2024-01-02 RX ORDER — AZELASTINE 1 MG/ML
1 SPRAY, METERED NASAL 2 TIMES DAILY PRN
Qty: 30 ML | Refills: 3 | Status: SHIPPED | OUTPATIENT
Start: 2024-01-02

## 2024-01-02 RX ORDER — AMOXICILLIN AND CLAVULANATE POTASSIUM 875; 125 MG/1; MG/1
1 TABLET, FILM COATED ORAL 2 TIMES DAILY
Qty: 20 TABLET | Refills: 0 | Status: SHIPPED | OUTPATIENT
Start: 2024-01-02 | End: 2024-01-12

## 2024-01-02 NOTE — PROGRESS NOTES
"Subjective:      Patient ID: Andra Newell is a 63 y.o. female.    Vitals:  height is 5' 6" (1.676 m) and weight is 79.4 kg (175 lb). Her oral temperature is 98.4 °F (36.9 °C). Her blood pressure is 148/84 (abnormal) and her pulse is 88. Her respiration is 20 and oxygen saturation is 97%.     Chief Complaint: Sore Throat    Sore Throat   This is a new problem. The current episode started 1 to 4 weeks ago. The problem has been unchanged. Neither side of throat is experiencing more pain than the other. There has been no fever. The fever has been present for Less than 1 day. The pain is at a severity of 8/10. The pain is moderate. Associated symptoms include congestion, swollen glands and trouble swallowing. Pertinent negatives include no abdominal pain, coughing, diarrhea, drooling, ear discharge, ear pain, headaches, hoarse voice, plugged ear sensation, neck pain, shortness of breath, stridor or vomiting. She has had no exposure to strep or mono. Treatments tried: OTC MEDS. The treatment provided no relief.       Constitution: Negative for chills, sweating, fatigue and fever.   HENT:  Positive for congestion, postnasal drip, sinus pressure, sore throat, trouble swallowing and voice change. Negative for ear pain, ear discharge, drooling, nosebleeds, foreign body in nose and sinus pain.    Neck: Negative for neck pain, neck stiffness, painful lymph nodes and neck swelling.   Cardiovascular:  Negative for chest pain, leg swelling, palpitations, sob on exertion and passing out.   Eyes:  Negative for eye pain, eye redness, photophobia, double vision, blurred vision and eyelid swelling.   Respiratory:  Negative for chest tightness, cough, sputum production, bloody sputum, shortness of breath, stridor and wheezing.    Gastrointestinal:  Negative for abdominal pain, abdominal bloating, nausea, vomiting, constipation, diarrhea and heartburn.   Musculoskeletal:  Negative for joint pain, joint swelling, abnormal ROM of " joint, back pain, muscle cramps and muscle ache.   Skin:  Negative for rash and hives.   Allergic/Immunologic: Negative for seasonal allergies, food allergies, hives, itching and sneezing.   Neurological:  Negative for dizziness, light-headedness, passing out, loss of balance, headaches, altered mental status, loss of consciousness and seizures.   Hematologic/Lymphatic: Negative for swollen lymph nodes.   Psychiatric/Behavioral:  Negative for altered mental status and nervous/anxious. The patient is not nervous/anxious.       Objective:     Physical Exam   Constitutional: She is oriented to person, place, and time. She appears well-developed. She is cooperative.  Non-toxic appearance. She does not appear ill. No distress.   HENT:   Head: Normocephalic and atraumatic.   Ears:   Right Ear: Hearing, tympanic membrane, external ear and ear canal normal.   Left Ear: Hearing, tympanic membrane, external ear and ear canal normal.   Nose: Mucosal edema and rhinorrhea present. No nasal deformity. No epistaxis. Right sinus exhibits no maxillary sinus tenderness and no frontal sinus tenderness. Left sinus exhibits no maxillary sinus tenderness and no frontal sinus tenderness.   Mouth/Throat: Uvula is midline and mucous membranes are normal. No trismus in the jaw. Normal dentition. No uvula swelling. Posterior oropharyngeal erythema and cobblestoning present. No oropharyngeal exudate, posterior oropharyngeal edema or tonsillar abscesses. No tonsillar exudate.   Eyes: Conjunctivae and lids are normal. No scleral icterus.   Neck: Trachea normal and phonation normal. Neck supple. No edema present. No erythema present. No neck rigidity present.   Cardiovascular: Normal rate, regular rhythm, normal heart sounds and normal pulses.   Pulmonary/Chest: Effort normal and breath sounds normal. No accessory muscle usage or stridor. No respiratory distress. She has no decreased breath sounds. She has no wheezes. She has no rhonchi. She has  no rales.   Abdominal: Normal appearance.   Musculoskeletal: Normal range of motion.         General: No deformity or edema. Normal range of motion.   Lymphadenopathy:     She has cervical adenopathy.        Right cervical: No superficial cervical adenopathy present.       Left cervical: Superficial cervical adenopathy present.   Neurological: She is alert and oriented to person, place, and time. She exhibits normal muscle tone. Coordination normal.   Skin: Skin is warm, dry, intact, not diaphoretic, not pale and no rash. Capillary refill takes less than 2 seconds.   Psychiatric: Her speech is normal and behavior is normal. Judgment and thought content normal.   Nursing note and vitals reviewed.      Results for orders placed or performed in visit on 01/02/24   POCT Strep A, Molecular   Result Value Ref Range    Molecular Strep A, POC Negative Negative     Acceptable Yes      *Note: Due to a large number of results and/or encounters for the requested time period, some results have not been displayed. A complete set of results can be found in Results Review.     Assessment:     1. Bacterial pharyngitis    2. Sore throat    3. Sinus congestion    4. Postnasal drip      Plan:   Discussed test results done in clinic today with patient. Advised close follow-up with PCP and/or Specialist for further evaluation as needed. ER precautions given to patient as well. Patient aware, verbalized understanding and agreed with plan of care.    Bacterial pharyngitis    Sore throat  -     POCT Strep A, Molecular  -     (Magic mouthwash) 1:1:1 diphenhydrAMINE(Benadryl) 12.5mg/5ml liq, aluminum & magnesium hydroxide-simethicone (Maalox), LIDOcaine viscous 2%; 10 cc swish and spit every 4 hours as needed for mouth sores or sore throat  Dispense: 90 mL; Refill: 0    Sinus congestion  -     fluticasone propionate (FLONASE ALLERGY RELIEF) 50 mcg/actuation nasal spray; 1 spray (50 mcg total) by Each Nostril route 2 (two) times  daily.  Dispense: 16 mL; Refill: 3  -     azelastine (ASTELIN) 137 mcg (0.1 %) nasal spray; 1 spray (137 mcg total) by Nasal route 2 (two) times daily as needed for Rhinitis.  Dispense: 30 mL; Refill: 3    Postnasal drip  -     fluticasone propionate (FLONASE ALLERGY RELIEF) 50 mcg/actuation nasal spray; 1 spray (50 mcg total) by Each Nostril route 2 (two) times daily.  Dispense: 16 mL; Refill: 3  -     azelastine (ASTELIN) 137 mcg (0.1 %) nasal spray; 1 spray (137 mcg total) by Nasal route 2 (two) times daily as needed for Rhinitis.  Dispense: 30 mL; Refill: 3    Other orders  -     amoxicillin-clavulanate 875-125mg (AUGMENTIN) 875-125 mg per tablet; Take 1 tablet by mouth 2 (two) times daily. for 10 days  Dispense: 20 tablet; Refill: 0      There are no Patient Instructions on file for this visit.

## 2024-01-02 NOTE — TELEPHONE ENCOUNTER
LM with pt that Dr. Marina is out of the office this week and that she should go to an urgent care for medical attention.

## 2024-01-02 NOTE — TELEPHONE ENCOUNTER
----- Message from Kailey Craft sent at 1/2/2024  8:14 AM CST -----  Contact: [email or web address]/[phone]  [email or web address]     Patient is calling for Medical Advice regarding:Cough/sore throat/low fever      Would like response via mychart:  Call was transferred to On call nurse     Comments:Symptoms: Sore Throat, Cough, Fever  Outcome: Talk to a nurse or provider within 15 minutes.  Reason: Caller denied all higher acuity questions    The caller accepted this outcome

## 2024-01-02 NOTE — TELEPHONE ENCOUNTER
Pt with complaints of sore throat for 3 days, 10/10 pain scale.  Denies any fever currently.  98.8 is current temp during triage call.  Care advice states to see a provider within 24 hours.  [facility] offered and declined.  Unable to schedule appointment as none avail, pt states she will go to the [facility] now.  Patient verbally understands, all questions answered, advised to call back for any worsening symptoms or further needs.       Reason for Disposition   SEVERE (e.g., excruciating) throat pain    Additional Information   Negative: SEVERE difficulty breathing (e.g., struggling for each breath, speaks in single words, stridor)   Negative: Sounds like a life-threatening emergency to the triager   Negative: [1] Drooling or spitting out saliva (because can't swallow) AND [2] normal breathing   Negative: Unable to open mouth completely   Negative: [1] Difficulty breathing AND [2] not severe   Negative: Fever > 104 F (40 C)   Negative: [1] Refuses to drink anything AND [2] for > 12 hours   Negative: [1] Drinking very little AND [2] dehydration suspected (e.g., no urine > 12 hours, very dry mouth, very lightheaded)   Negative: Patient sounds very sick or weak to the triager    Protocols used: Sore Throat-A-AH

## 2024-01-11 NOTE — TELEPHONE ENCOUNTER
"Spoke with Millicent at patients nephrologist MD office. Andra hasnt been seen since August in the office. Her appt is tomorrow, 11/20/18. Millicent will fax notes once completed.   She will fax last progress note from August 2018 for review.     Spoke with Andra. She again expressed dissatisfaction with her transplant cancellation and Dr Anahy Weiss's "Attitude in clinic". She wishes not to be seen by her in clinic and for transplant if possible. Explained to her that [facility] transplant nephrology is a team, and that could not be promised.     Medically she has completed her Z pack for bronchitis, and is having no medical issues that would prevent transplant at this time. She stated that she has had no diarrhea since her medication has ze stopped. She expressed excitement for her possible transplant on 1/14/19.   " LOV: 10/27/23  Last Refilled:#4.2ml, 0rfs 12/12/23    ASSESSMENT/PLAN:      Seropositive RA  - Found to have + RF and CCP.  No erosions on x-rays  - Restarted Enbrel 3 mos ago, initially continued to have a lot of joint pain and swelling but symptoms improved last week  - She was on methotrexate previously.  Her lichen planus has worsened off methotrexate.  Recently had inflammationin her eyes and was told it was from her RA  - Plan to add methotrexate 4 pills weekly and folic acid daily  - Blood work reviewed with patient, will continue to monitor every 3 months     Pt will f/u in 3-4 mos      Smita Heck MD  10/27/2023   12:00 PM  Please advise.      no

## 2024-01-16 ENCOUNTER — LAB VISIT (OUTPATIENT)
Dept: LAB | Facility: HOSPITAL | Age: 64
End: 2024-01-16
Attending: INTERNAL MEDICINE
Payer: MEDICARE

## 2024-01-16 DIAGNOSIS — Z94.0 KIDNEY REPLACED BY TRANSPLANT: ICD-10-CM

## 2024-01-16 LAB
ALBUMIN SERPL BCP-MCNC: 4.1 G/DL (ref 3.5–5.2)
ALBUMIN SERPL BCP-MCNC: 4.1 G/DL (ref 3.5–5.2)
ALP SERPL-CCNC: 171 U/L (ref 55–135)
ALT SERPL W/O P-5'-P-CCNC: 45 U/L (ref 10–44)
ANION GAP SERPL CALC-SCNC: 10 MMOL/L (ref 8–16)
AST SERPL-CCNC: 39 U/L (ref 10–40)
BASOPHILS # BLD AUTO: 0.04 K/UL (ref 0–0.2)
BASOPHILS NFR BLD: 0.6 % (ref 0–1.9)
BILIRUB DIRECT SERPL-MCNC: 0.2 MG/DL (ref 0.1–0.3)
BILIRUB SERPL-MCNC: 0.5 MG/DL (ref 0.1–1)
BUN SERPL-MCNC: 20 MG/DL (ref 8–23)
CALCIUM SERPL-MCNC: 10.7 MG/DL (ref 8.7–10.5)
CHLORIDE SERPL-SCNC: 105 MMOL/L (ref 95–110)
CO2 SERPL-SCNC: 26 MMOL/L (ref 23–29)
CREAT SERPL-MCNC: 1.4 MG/DL (ref 0.5–1.4)
DIFFERENTIAL METHOD BLD: ABNORMAL
EOSINOPHIL # BLD AUTO: 0.4 K/UL (ref 0–0.5)
EOSINOPHIL NFR BLD: 6.1 % (ref 0–8)
ERYTHROCYTE [DISTWIDTH] IN BLOOD BY AUTOMATED COUNT: 14.6 % (ref 11.5–14.5)
EST. GFR  (NO RACE VARIABLE): 41.8 ML/MIN/1.73 M^2
GLUCOSE SERPL-MCNC: 222 MG/DL (ref 70–110)
HCT VFR BLD AUTO: 40.3 % (ref 37–48.5)
HGB BLD-MCNC: 13.1 G/DL (ref 12–16)
IMM GRANULOCYTES # BLD AUTO: 0.02 K/UL (ref 0–0.04)
IMM GRANULOCYTES NFR BLD AUTO: 0.3 % (ref 0–0.5)
LYMPHOCYTES # BLD AUTO: 1 K/UL (ref 1–4.8)
LYMPHOCYTES NFR BLD: 14.6 % (ref 18–48)
MAGNESIUM SERPL-MCNC: 1.5 MG/DL (ref 1.6–2.6)
MCH RBC QN AUTO: 28.9 PG (ref 27–31)
MCHC RBC AUTO-ENTMCNC: 32.5 G/DL (ref 32–36)
MCV RBC AUTO: 89 FL (ref 82–98)
MONOCYTES # BLD AUTO: 0.4 K/UL (ref 0.3–1)
MONOCYTES NFR BLD: 6.1 % (ref 4–15)
NEUTROPHILS # BLD AUTO: 5 K/UL (ref 1.8–7.7)
NEUTROPHILS NFR BLD: 72.3 % (ref 38–73)
NRBC BLD-RTO: 0 /100 WBC
PHOSPHATE SERPL-MCNC: 5.2 MG/DL (ref 2.7–4.5)
PLATELET # BLD AUTO: 202 K/UL (ref 150–450)
PMV BLD AUTO: 10.6 FL (ref 9.2–12.9)
POTASSIUM SERPL-SCNC: 4.7 MMOL/L (ref 3.5–5.1)
PROT SERPL-MCNC: 7.9 G/DL (ref 6–8.4)
RBC # BLD AUTO: 4.54 M/UL (ref 4–5.4)
SODIUM SERPL-SCNC: 141 MMOL/L (ref 136–145)
WBC # BLD AUTO: 6.93 K/UL (ref 3.9–12.7)

## 2024-01-16 PROCEDURE — 83735 ASSAY OF MAGNESIUM: CPT | Performed by: INTERNAL MEDICINE

## 2024-01-16 PROCEDURE — 84075 ASSAY ALKALINE PHOSPHATASE: CPT | Performed by: INTERNAL MEDICINE

## 2024-01-16 PROCEDURE — 87799 DETECT AGENT NOS DNA QUANT: CPT | Mod: 91 | Performed by: INTERNAL MEDICINE

## 2024-01-16 PROCEDURE — 80069 RENAL FUNCTION PANEL: CPT | Performed by: INTERNAL MEDICINE

## 2024-01-16 PROCEDURE — 87799 DETECT AGENT NOS DNA QUANT: CPT | Performed by: INTERNAL MEDICINE

## 2024-01-16 PROCEDURE — 80197 ASSAY OF TACROLIMUS: CPT | Performed by: INTERNAL MEDICINE

## 2024-01-16 PROCEDURE — 85025 COMPLETE CBC W/AUTO DIFF WBC: CPT | Performed by: INTERNAL MEDICINE

## 2024-01-17 ENCOUNTER — OFFICE VISIT (OUTPATIENT)
Dept: PRIMARY CARE CLINIC | Facility: CLINIC | Age: 64
End: 2024-01-17
Payer: MEDICARE

## 2024-01-17 VITALS
TEMPERATURE: 98 F | SYSTOLIC BLOOD PRESSURE: 128 MMHG | WEIGHT: 175.13 LBS | HEART RATE: 82 BPM | OXYGEN SATURATION: 97 % | BODY MASS INDEX: 28.15 KG/M2 | RESPIRATION RATE: 18 BRPM | HEIGHT: 66 IN | DIASTOLIC BLOOD PRESSURE: 78 MMHG

## 2024-01-17 DIAGNOSIS — Z94.0 KIDNEY TRANSPLANT RECIPIENT: Chronic | ICD-10-CM

## 2024-01-17 DIAGNOSIS — E11.22 TYPE 2 DIABETES MELLITUS WITH STAGE 3A CHRONIC KIDNEY DISEASE, WITH LONG-TERM CURRENT USE OF INSULIN: ICD-10-CM

## 2024-01-17 DIAGNOSIS — I70.0 AORTIC ATHEROSCLEROSIS: ICD-10-CM

## 2024-01-17 DIAGNOSIS — I25.119 ATHEROSCLEROSIS OF NATIVE CORONARY ARTERY OF NATIVE HEART WITH ANGINA PECTORIS: ICD-10-CM

## 2024-01-17 DIAGNOSIS — I35.1 AORTIC VALVE INSUFFICIENCY, ETIOLOGY OF CARDIAC VALVE DISEASE UNSPECIFIED: Chronic | ICD-10-CM

## 2024-01-17 DIAGNOSIS — Z79.60 LONG-TERM USE OF IMMUNOSUPPRESSANT MEDICATION: Chronic | ICD-10-CM

## 2024-01-17 DIAGNOSIS — E11.69 MIXED DIABETIC HYPERLIPIDEMIA ASSOCIATED WITH TYPE 2 DIABETES MELLITUS: ICD-10-CM

## 2024-01-17 DIAGNOSIS — E11.8 CONTROLLED TYPE 2 DIABETES MELLITUS WITH COMPLICATION, WITH LONG-TERM CURRENT USE OF INSULIN: Primary | Chronic | ICD-10-CM

## 2024-01-17 DIAGNOSIS — Z79.4 TYPE 2 DIABETES MELLITUS WITH STAGE 3A CHRONIC KIDNEY DISEASE, WITH LONG-TERM CURRENT USE OF INSULIN: ICD-10-CM

## 2024-01-17 DIAGNOSIS — F13.20 BENZODIAZEPINE DEPENDENCE, CONTINUOUS: ICD-10-CM

## 2024-01-17 DIAGNOSIS — E78.2 MIXED DIABETIC HYPERLIPIDEMIA ASSOCIATED WITH TYPE 2 DIABETES MELLITUS: ICD-10-CM

## 2024-01-17 DIAGNOSIS — N18.31 TYPE 2 DIABETES MELLITUS WITH STAGE 3A CHRONIC KIDNEY DISEASE, WITH LONG-TERM CURRENT USE OF INSULIN: ICD-10-CM

## 2024-01-17 DIAGNOSIS — R05.8 POST-VIRAL COUGH SYNDROME: ICD-10-CM

## 2024-01-17 DIAGNOSIS — R82.90 ABNORMAL URINALYSIS: ICD-10-CM

## 2024-01-17 DIAGNOSIS — D84.9 IMMUNOSUPPRESSION: ICD-10-CM

## 2024-01-17 DIAGNOSIS — Z79.4 CONTROLLED TYPE 2 DIABETES MELLITUS WITH COMPLICATION, WITH LONG-TERM CURRENT USE OF INSULIN: Primary | Chronic | ICD-10-CM

## 2024-01-17 DIAGNOSIS — N25.81 SECONDARY HYPERPARATHYROIDISM OF RENAL ORIGIN: ICD-10-CM

## 2024-01-17 DIAGNOSIS — F33.0 MDD (MAJOR DEPRESSIVE DISORDER), RECURRENT EPISODE, MILD: ICD-10-CM

## 2024-01-17 LAB — TACROLIMUS BLD-MCNC: 3.8 NG/ML (ref 5–15)

## 2024-01-17 PROCEDURE — 99215 OFFICE O/P EST HI 40 MIN: CPT | Mod: S$GLB,,, | Performed by: FAMILY MEDICINE

## 2024-01-17 PROCEDURE — 99999 PR PBB SHADOW E&M-EST. PATIENT-LVL V: CPT | Mod: PBBFAC,,, | Performed by: FAMILY MEDICINE

## 2024-01-17 RX ORDER — BENZONATATE 100 MG/1
100 CAPSULE ORAL 3 TIMES DAILY PRN
Qty: 28 CAPSULE | Refills: 1 | Status: SHIPPED | OUTPATIENT
Start: 2024-01-17

## 2024-01-17 NOTE — ASSESSMENT & PLAN NOTE
She does remain on immunosuppression because of her transplant status.  She has had occasional respiratory and urinary infections.  She will continue to monitor symptoms closely and report to me any changes or concerns

## 2024-01-17 NOTE — ASSESSMENT & PLAN NOTE
She does not report any change in activity.  No significant edema and lungs were clear.  Last echo was in July of 2023.  Will recommend monitoring annually or sooner if any symptoms should develop

## 2024-01-17 NOTE — PROGRESS NOTES
Primary Care Provider Appointment   [facility]       Patient ID: Andra Newell is a 63 y.o. female.    ASSESSMENT/PLAN by Problem List:    1. Controlled type 2 diabetes mellitus with complication, with long-term current use of insulin, HbA1c 6.7 percent  Assessment & Plan:  Increase in A1c but still satisfactory.  She does continue to follow closely with Endocrinology.      2. Type 2 diabetes mellitus with stage 3a chronic kidney disease, with long-term current use of insulin    3. Kidney transplant recipient  Overview:  Left kidney transplant 1/14/2019    Assessment & Plan:  She is doing well and continues to follow closely with her transplant team.      4. Long-term use of immunosuppressant medication  Assessment & Plan:  She does remain on immunosuppression because of her transplant status.  She has had occasional respiratory and urinary infections.  She will continue to monitor symptoms closely and report to me any changes or concerns      5. Immunosuppression    6. Mixed diabetic hyperlipidemia associated with type 2 diabetes mellitus    7. Secondary hyperparathyroidism of renal origin  Assessment & Plan:  Her calcium was mildly elevated.  She is asymptomatic and is expecting a call back from Nephrology regarding her recent labs.      8. MDD (major depressive disorder), recurrent episode, mild  Assessment & Plan:  She is doing well on Viibryd and Vraylar.  She does see Psychiatry and also is back taking lorazepam in the evening.  She apparently is doing well on current regimen so will monitor.      9. Atherosclerosis of native coronary artery of native heart with angina pectoris  Overview:  [facility] 7/10/23:  Summary     The Dist RCA lesion was 50% stenosed.    The Dist LAD lesion was 65% stenosed.    The pre-procedure left ventricular end diastolic pressure was 10.    The estimated blood loss was none.    There was non-obstructive coronary artery disease..PATENT STENT  IN DIAGONAL  Date: 7/10/2023  Time: 2:44 PM      Assessment & Plan:  She appears stable and does not report any new or unstable symptoms.  Continue current risk factor management and medications.  Regular follow-up with Cardiology.      10. Aortic atherosclerosis  Assessment & Plan:  Noted on previous imaging.  Deconditioned is stable.  We are treating her risk factors including lipids, diabetes, etc..  She does remain on rosuvastatin 20 mg along with 81 mg aspirin      11. Benzodiazepine dependence, continuous  Assessment & Plan:  She is on lorazepam in the evening, prescribed by Psychiatry.  This is helping her along with her antidepressants.  We did discuss safety of opioids and benzodiazepines.  Her opioid uses is relatively light right now but she is aware of the concerns.  Since the pain pump was removed she did resume oral opioids      12. Aortic valve insufficiency, etiology of cardiac valve disease unspecified  Overview:  Echo 7/23:  Aortic Valve Aortic valve sclerosis is mild. There is normal leaflet mobility. Mild to moderate regurgitation.       Assessment & Plan:  She does not report any change in activity.  No significant edema and lungs were clear.  Last echo was in July of 2023.  Will recommend monitoring annually or sooner if any symptoms should develop      13. Post-viral cough syndrome  Assessment & Plan:  Lungs are clear today.  Suspect postviral cough.  She requested Tessalon Perles which have worked for her.  I will fill this.  Although I did advise her if another week or so goes by and symptoms have not resolved or should they worsen in the meantime then I would want to proceed with a chest x-ray.      14. Abnormal urinalysis  Assessment & Plan:  Reviewed recent urinalysis which does reveal positive nitrites and white blood cells.  She does have mild symptoms but these symptoms have been present for several weeks.  Therefore at this point I recommend waiting 24-48 hours for culture to return  so we can narrow down the antibiotic appropriately.  If symptoms were to worsen she will notify me right away.      Other orders  -     benzonatate (TESSALON) 100 MG capsule; Take 1 capsule (100 mg total) by mouth 3 (three) times daily as needed for Cough.  Dispense: 28 capsule; Refill: 1           Follow Up:  Two month  Total time 46 minutes.    Subjective:     Chief Complaint   Patient presents with    Follow-up     I have reviewed the information entered by the ancillary staff regarding the chief complaint as well as the related history.    HPI    Patient is a/an 63 y.o.  female     Routine follow-up, multiple chronic problems.  But also recent labs performed by the transplant clinic that she would like for me to review.    Review labs, UA +nitritites, some intermittent dysuria, will wait for culture  Mg low, phos elevated, Calcium mildly elevated    Had pump removed, did report withdrawal symptoms.  Did resume oral opiods  Discussed ativan, back on in pm, psych    UC on 1/2, cough persists, dry, no fever, nonproductive, but chest wall and other muscles are sore from coughing.  Request Tessalon Perles    For complete problem list, past medical history, surgical history, social history, etc., see appropriate section in the electronic medical record    Review of Systems   Constitutional:  Negative for fever.   Respiratory:  Positive for cough.    Cardiovascular: Negative.    Musculoskeletal:  Positive for back pain.       Objective     Physical Exam  HENT:      Head: Normocephalic and atraumatic.   Eyes:      General: No scleral icterus.     Conjunctiva/sclera: Conjunctivae normal.   Cardiovascular:      Rate and Rhythm: Normal rate and regular rhythm.      Heart sounds: Murmur heard.      Diastolic murmur is present with a grade of 2/4.      No friction rub.      Comments: There is no peripheral edema.  Pulmonary:      Effort: Pulmonary effort is normal. No respiratory distress.      Breath sounds: Normal breath  "sounds. No wheezing or rales.   Psychiatric:         Mood and Affect: Mood normal.       Vitals:    01/17/24 1305   BP: 128/78   BP Location: Right arm   Patient Position: Sitting   BP Method: Medium (Manual)   Pulse: 82   Resp: 18   Temp: 98.4 °F (36.9 °C)   TempSrc: Oral   SpO2: 97%   Weight: 79.5 kg (175 lb 2.5 oz)   Height: 5' 6" (1.676 m)           THIS DOCUMENT WAS MADE IN PART WITH VOICE RECOGNITION SOFTWARE.  OCCASIONALLY THIS SOFTWARE WILL MISINTERPRET WORDS OR PHRASES.  "

## 2024-01-17 NOTE — ASSESSMENT & PLAN NOTE
Her calcium was mildly elevated.  She is asymptomatic and is expecting a call back from Nephrology regarding her recent labs.

## 2024-01-17 NOTE — ASSESSMENT & PLAN NOTE
She is doing well on Viibryd and Vraylar.  She does see Psychiatry and also is back taking lorazepam in the evening.  She apparently is doing well on current regimen so will monitor.

## 2024-01-17 NOTE — ASSESSMENT & PLAN NOTE
Lungs are clear today.  Suspect postviral cough.  She requested Tessalon Perles which have worked for her.  I will fill this.  Although I did advise her if another week or so goes by and symptoms have not resolved or should they worsen in the meantime then I would want to proceed with a chest x-ray.

## 2024-01-17 NOTE — ASSESSMENT & PLAN NOTE
Reviewed recent urinalysis which does reveal positive nitrites and white blood cells.  She does have mild symptoms but these symptoms have been present for several weeks.  Therefore at this point I recommend waiting 24-48 hours for culture to return so we can narrow down the antibiotic appropriately.  If symptoms were to worsen she will notify me right away.

## 2024-01-17 NOTE — ASSESSMENT & PLAN NOTE
She appears stable and does not report any new or unstable symptoms.  Continue current risk factor management and medications.  Regular follow-up with Cardiology.

## 2024-01-17 NOTE — ASSESSMENT & PLAN NOTE
Noted on previous imaging.  Deconditioned is stable.  We are treating her risk factors including lipids, diabetes, etc..  She does remain on rosuvastatin 20 mg along with 81 mg aspirin

## 2024-01-17 NOTE — ASSESSMENT & PLAN NOTE
She is on lorazepam in the evening, prescribed by Psychiatry.  This is helping her along with her antidepressants.  We did discuss safety of opioids and benzodiazepines.  Her opioid uses is relatively light right now but she is aware of the concerns.  Since the pain pump was removed she did resume oral opioids

## 2024-01-19 DIAGNOSIS — N30.00 ACUTE CYSTITIS WITHOUT HEMATURIA: Primary | ICD-10-CM

## 2024-01-19 RX ORDER — AMOXICILLIN AND CLAVULANATE POTASSIUM 875; 125 MG/1; MG/1
1 TABLET, FILM COATED ORAL 2 TIMES DAILY
Qty: 14 TABLET | Refills: 0 | Status: SHIPPED | OUTPATIENT
Start: 2024-01-19 | End: 2024-01-26

## 2024-01-19 NOTE — PROGRESS NOTES
"   Kidney Post-Transplant Assessment    Referring Physician: Dalton Heaton  Current Nephrologist: Adolfo Steel    ORGAN: LEFT KIDNEY  Donor Type: living  PHS Increased Risk: no  Cold Ischemia: 37 mins  Induction Medications: campath - alemtuzumab (anti-cd52)    Subjective:     CC:  Reassessment of renal allograft function and management of chronic immunosuppression.    HPI:  Ms. Newell is a 63 y.o. year old White female who received a living kidney transplant on 1/14/19.  She has CKD stage 2 - GFR 60-89 and her baseline creatinine is between 1.3. She takes tacrolimus for maintenance immunosuppression. She denies any recent hospitalizations or ER visits since her previous clinic visit.      Per Dr. Avery's note: 2/14/2022  "Pertinent History:  -LURD KT (friend) 01/14/2019, Induced with Campath.     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 Rx until 7/14/19  - FUNGAL px: None  - -admitted 05/07/2019 with fever infectious workup negative for bacteria.  Refractory anemia noted, and it was found a parvovirus B19 from 4/15/19 causing aplastic anemia was positive  - recurrent UTI - resolved, saw Uro gynecologist at [facility]"      Patient doing well. Had fentanyl pump placed approx 2 weeks ago. Recently with lethargy and decrease appetite. Underwent removal of pump mid December. She was not prescribed oral narcotics following pump removal. She had withdrawals for about 7-10 days with nausea and vomiting, sweats, and chills. She is now oral narcotics and is doing well. She " "is awake and alert more than previously and eating well. She being treated with abx for UTI. Drinking well with good UOP.     Review of Systems   Constitutional:  Negative for appetite change, chills, fatigue and fever.   HENT:  Negative for trouble swallowing.    Respiratory:  Negative for cough, chest tightness, shortness of breath and wheezing.    Cardiovascular:  Negative for chest pain, palpitations and leg swelling.   Gastrointestinal:  Negative for abdominal pain, constipation, diarrhea and nausea.   Genitourinary:  Negative for difficulty urinating, frequency and urgency.   Musculoskeletal:  Negative for arthralgias and myalgias.   Skin:  Negative for rash.   Neurological:  Negative for dizziness, weakness, light-headedness and headaches.   Psychiatric/Behavioral:  Negative for sleep disturbance.        Objective:   Blood pressure 120/70, pulse 88, temperature 97.3 °F (36.3 °C), temperature source Skin, resp. rate 18, height 5' 6" (1.676 m), weight 81.1 kg (178 lb 12.7 oz), last menstrual period 02/28/2012, SpO2 98 %.body mass index is 28.86 kg/m².    Physical Exam  Constitutional:       General: She is not in acute distress.     Appearance: She is well-developed. She is not diaphoretic.   Cardiovascular:      Rate and Rhythm: Normal rate and regular rhythm.      Heart sounds: Normal heart sounds.   Pulmonary:      Effort: Pulmonary effort is normal.      Breath sounds: Normal breath sounds.   Abdominal:      General: Bowel sounds are normal.      Palpations: Abdomen is soft.   Musculoskeletal:         General: No tenderness. Normal range of motion.   Skin:     General: Skin is warm and dry.      Findings: No rash.      Nails: There is no clubbing.   Neurological:      Mental Status: She is alert and oriented to person, place, and time.   Psychiatric:         Behavior: Behavior normal.         Labs:  Lab Results   Component Value Date    WBC 6.93 01/16/2024    HGB 13.1 01/16/2024    HCT 40.3 01/16/2024    NA " "141 01/16/2024    K 4.7 01/16/2024     01/16/2024    CO2 26 01/16/2024    BUN 20 01/16/2024    CREATININE 1.4 01/16/2024    EGFRNORACEVR 41.8 (A) 01/16/2024    CALCIUM 10.7 (H) 01/16/2024    PHOS 5.2 (H) 01/16/2024    MG 1.5 (L) 01/16/2024    ALBUMIN 4.1 01/16/2024    ALBUMIN 4.1 01/16/2024    AST 39 01/16/2024    ALT 45 (H) 01/16/2024       Lab Results   Component Value Date    EXTANC 1,240 04/15/2019    EXTWBC 2.0 (L) 04/15/2019    EXTSEGS 62.0 04/15/2019    EXTPLATELETS 141 04/15/2019    EXTHEMOGLOBI 8.7 (L) 04/15/2019    EXTHEMATOCRI 27.6 (L) 04/15/2019    EXTCREATININ 0.84 04/15/2019    EXTSODIUM 140 04/15/2019    EXTPOTASSIUM 4.3 04/15/2019    EXTBUN 13 04/15/2019    EXTCO2 23.0 04/15/2019    EXTCALCIUM 8.6 04/15/2019    EXTGLUCOSE 128 (H) 04/15/2019    EXTALBUMIN 3.4 04/15/2019    EXTAST 37 04/15/2019    EXTALT 35 (H) 04/15/2019    EXTBILITOTAL 0.8 04/15/2019       No results found for: "EXTCYCLOSLVL", "EXTSIROLIMUS", "EXTTACROLVL", "EXTPROTCRE", "EXTPTHINTACT", "EXTPROTEINUA", "EXTWBCUA", "EXTRBCUA"    Labs were reviewed with the patient.    Assessment:     1. Kidney transplant recipient    2. Stage 3a chronic kidney disease    3. Controlled type 2 diabetes mellitus with complication, with long-term current use of insulin    4. Essential hypertension         Plan:   Needs repeat Tac level  Continue to follow with PCP (health maintenance and health screening) and General Nephrologist (CKD care)  Need to establish Dermatologist for yearly skin check due to IS therapy.  Encourage lifestyle modifications: diet, exercise, weight loss   Continue current IS therapy regimen  No need for refills on IS therapy today.       Allograft function assessment  -CKD 2, remains stable.   Recent Labs   Lab 04/09/22  1544 05/19/22  0747 06/09/22  0954 08/30/22  0845 07/11/23  0441 10/17/23  0854 01/16/24  1132   Creatinine 0.82 1.0 1.0   < > 1.0 1.1 1.4   eGFR if non  >60 59.7 A 59.7 A  --   --   --   --  "   eGFR if African American >60 >60.0 >60.0  --   --   --   --     < > = values in this interval not displayed.      Lab Results   Component Value Date    LIPASE 60 07/08/2023     DM-- on insulin, metformin       Encounter for Monitoring Immunosuppression post Transplant  Recent Labs   Lab 06/19/23  1606 10/17/23  0854 01/16/24  1132   Tacrolimus Lvl 6.0 4.2 L 3.8 L   -Target level for Andra is  -Recheck as per guidelines.  -Monitor for side effects and toxicities, given narrow therapeutic window and significant risk of AE       Evaluation for bone marrow suppression (r/t immunosuppression toxicity or infection)  Lab Results   Component Value Date    WBC 6.93 01/16/2024    HGB 13.1 01/16/2024    HCT 40.3 01/16/2024     01/16/2024       Renal hypertension--within target, watch  BP Readings from Last 3 Encounters:   01/22/24 120/70   01/17/24 128/78   01/02/24 (!) 148/84    Metoprolol, losartan, imdur    Metabolic bone disease [Secondary hyperparathyroidism, Phosphorus metabolism disorders]  Recent Labs   Lab 11/17/21  1110 02/07/22  0803 05/19/22  0747 06/09/22  0954 07/10/23  0454 07/11/23  0441 10/17/23  0854 01/16/24  1132   Albumin 3.8   < > 3.5   < > 3.9 3.8 4.0 4.1  4.1   Calcium 9.6  9.6   < > 9.1   < > 9.0 9.0 10.4 10.7 H   Phosphorus 3.4   < >  --    < > 3.4 3.7 4.1 5.2 H   Magnesium 1.5 L   < > 1.7   < > 1.7 1.7  --  1.5 L   PTH, Intact 101.4 H  --  153.4 H  --   --   --  44.0  --     < > = values in this interval not displayed.        Assessment of electrolytes  Lab Results   Component Value Date     01/16/2024    K 4.7 01/16/2024     01/16/2024    CO2 26 01/16/2024    MG 1.5 (L) 01/16/2024       Screening for BK infection to prevent allograft dysfunction  Lab Results   Component Value Date    BKVIRUSPCRQB <125 01/16/2024   - BK PCR is acceptable  -Continue blood PCR screening as per program guidelines to prevent BK viremia and nephropathy leading to allograft dysfunction and potential  graft failure      Screening for proteinuria & urinary abnormalities  Recent Labs   Lab 10/17/23  0902 01/16/24  1111 01/16/24  1119   Protein, UA Negative  --  Negative   Glucose, UA Negative  --  4+ A   Occult Blood UA Negative  --  Negative   Leukocytes, UA 2+ A  --  Negative   Prot/Creat Ratio, Urine 0.10 0.07  --    - Urine p/c ratio acceptable      Management of kidney disease and related issues (HTN, anemia, SPTH, metabolic bone disease) should continue by community nephrologist.       Follow-up:   Annual follow-up with kidney transplant clinic per written guidelines.  Patient also reminded to follow-up with general nephrologist.    Labs: since patient remains at high risk for rejection and drug-related complications that warrant close monitoring, labs will be ordered as follows: continue twice weekly CBC, renal panel, and drug level for first month; then same labs once weekly through 3rd month post-transplant.  Urine for UA and protein/creatinine ratio monthly.  Serum BK - PCR at 1-, 3-, 6-, 9-, 12-, 18-, 24-, 36-, 48-, and 60 months post-transplant.  Hepatic panel at 1-, 2-, 3-, 6-, 9-, 12-, 18-, 24-, and 36- months post-transplant.    Day Jose NP       Education:   Material provided to the patient.  Patient reminded to call with any health changes since these can be early signs of significant complications.  Also, I advised the patient to be sure any new medications or changes of old medications are discussed with either a pharmacist or physician knowledgeable with transplant to avoid rejection/drug toxicity related to significant drug interactions.    Patient advised that it is recommended that all transplant candidates, and their close contacts and household members receive Covid vaccination.    UNOS Patient Status  Functional Status: 90% - Able to carry on normal activity: minor symptoms of disease  Physical Capacity: No Limitations

## 2024-01-19 NOTE — TELEPHONE ENCOUNTER
Patient repeated back and voice a understanding of orders.  C/O S/S of UTI X several days.  Patient to repeat FK on 2/1/2024.  ----- Message from Rosetta Lafleur DO sent at 1/19/2024  2:37 PM CST -----  Graft function elevated from her baseline. Is she having any UTI symptoms? IF so, start the patient on Augmentin 875 BID for 7 days    Acceptable FK level given timing but please recheck FK level alone in 2 weeks.

## 2024-01-21 LAB
B19V DNA # SPEC NAA+PROBE: NOT DETECTED COPIES/ML
PARVOVIRUS B19 DNA, QUANT: NOT DETECTED LOG CPS/ML
SPECIMEN SOURCE: NORMAL

## 2024-01-22 ENCOUNTER — PATIENT MESSAGE (OUTPATIENT)
Dept: CARDIOLOGY | Facility: CLINIC | Age: 64
End: 2024-01-22
Payer: MEDICARE

## 2024-01-22 ENCOUNTER — OFFICE VISIT (OUTPATIENT)
Dept: TRANSPLANT | Facility: CLINIC | Age: 64
End: 2024-01-22
Payer: MEDICARE

## 2024-01-22 VITALS
OXYGEN SATURATION: 98 % | HEART RATE: 88 BPM | HEIGHT: 66 IN | WEIGHT: 178.81 LBS | BODY MASS INDEX: 28.74 KG/M2 | TEMPERATURE: 97 F | SYSTOLIC BLOOD PRESSURE: 120 MMHG | RESPIRATION RATE: 18 BRPM | DIASTOLIC BLOOD PRESSURE: 70 MMHG

## 2024-01-22 DIAGNOSIS — I12.9 HYPERTENSION ASSOCIATED WITH STAGE 3 CHRONIC KIDNEY DISEASE DUE TO TYPE 2 DIABETES MELLITUS: Chronic | ICD-10-CM

## 2024-01-22 DIAGNOSIS — E11.22 HYPERTENSION ASSOCIATED WITH STAGE 3 CHRONIC KIDNEY DISEASE DUE TO TYPE 2 DIABETES MELLITUS: Chronic | ICD-10-CM

## 2024-01-22 DIAGNOSIS — N18.31 STAGE 3A CHRONIC KIDNEY DISEASE: Chronic | ICD-10-CM

## 2024-01-22 DIAGNOSIS — N18.30 HYPERTENSION ASSOCIATED WITH STAGE 3 CHRONIC KIDNEY DISEASE DUE TO TYPE 2 DIABETES MELLITUS: Chronic | ICD-10-CM

## 2024-01-22 DIAGNOSIS — Z79.4 CONTROLLED TYPE 2 DIABETES MELLITUS WITH COMPLICATION, WITH LONG-TERM CURRENT USE OF INSULIN: Chronic | ICD-10-CM

## 2024-01-22 DIAGNOSIS — E11.8 CONTROLLED TYPE 2 DIABETES MELLITUS WITH COMPLICATION, WITH LONG-TERM CURRENT USE OF INSULIN: Chronic | ICD-10-CM

## 2024-01-22 DIAGNOSIS — Z94.0 KIDNEY TRANSPLANT RECIPIENT: Primary | Chronic | ICD-10-CM

## 2024-01-22 PROCEDURE — 97802 MEDICAL NUTRITION INDIV IN: CPT | Mod: S$GLB,,,

## 2024-01-22 PROCEDURE — 99215 OFFICE O/P EST HI 40 MIN: CPT | Mod: S$GLB,,, | Performed by: NURSE PRACTITIONER

## 2024-01-22 PROCEDURE — 99999 PR PBB SHADOW E&M-EST. PATIENT-LVL V: CPT | Mod: PBBFAC,,, | Performed by: NURSE PRACTITIONER

## 2024-01-22 NOTE — PROGRESS NOTES
REASON FOR VISIT:   S/p kidney transplant; high phos; 10% wt loss x 6 months per chart review    PAST MEDICAL HX:   S/p kidney transplant 1/14/2019, DM, HTN, HLD    LABS:   5.2    NUTRITION HX:   Pt and caregiver present. Pt reports appetite has improved since December with no N/V. Pt reports unintentional wt loss 2/2 N/V/decreased appetite 2/2 pain pump in back, now out. Pt with no questions or concerns at thi time.     INTERVENTION/EDUCATION:  Phosphorus content of food list provided & reviewed w/ pt; encouraged to decrease po intake of high phos foods and continue to monitor levels.    Patient voiced understanding of education & goals. Contact information was provided & will f/u as needed at clinic visits.     Consultation Time: 15 minutes

## 2024-01-29 ENCOUNTER — TELEPHONE (OUTPATIENT)
Dept: PRIMARY CARE CLINIC | Facility: CLINIC | Age: 64
End: 2024-01-29
Payer: MEDICARE

## 2024-01-29 DIAGNOSIS — N39.0 URINARY TRACT INFECTION WITHOUT HEMATURIA, SITE UNSPECIFIED: Primary | ICD-10-CM

## 2024-01-29 NOTE — TELEPHONE ENCOUNTER
If symptoms continue after she is fully recovered for another 7-10 days then consider repeating a urine culture.  If symptoms are rapidly worsening then go ahead and repeat now.  If the repeat urine test is normal yet she continues to have symptoms it may be wise to consult with Urology

## 2024-01-29 NOTE — TELEPHONE ENCOUNTER
Spoke with pt, discussed your message in its entirety, pt verbalized understanding.   Pt scheduled for labs on 2/1 and requests to have urine specimen scheduled for the same day.

## 2024-01-29 NOTE — TELEPHONE ENCOUNTER
Called pt for an update on pt's health status, she reports that she was recently treated for a UTI.  However, symptoms have not fully resolved, but pt has completed the full course of antibiotics about 3 days ago.  Current symptoms are pelvic pressure, severe mid to R back pain (post pain pump removal), urgency, frequency.  Symptoms are sporadic and inconsistent.   Pt has been on Augmentin for a total of 17 days during the month of January.     Please advise.

## 2024-01-31 ENCOUNTER — PATIENT MESSAGE (OUTPATIENT)
Dept: PRIMARY CARE CLINIC | Facility: CLINIC | Age: 64
End: 2024-01-31
Payer: MEDICARE

## 2024-01-31 RX ORDER — METOPROLOL SUCCINATE 25 MG/1
12.5 TABLET, EXTENDED RELEASE ORAL DAILY
Qty: 45 TABLET | Refills: 3 | Status: CANCELLED | OUTPATIENT
Start: 2024-01-31 | End: 2025-01-30

## 2024-02-01 ENCOUNTER — LAB VISIT (OUTPATIENT)
Dept: LAB | Facility: HOSPITAL | Age: 64
End: 2024-02-01
Attending: INTERNAL MEDICINE
Payer: MEDICARE

## 2024-02-01 DIAGNOSIS — Z94.0 KIDNEY REPLACED BY TRANSPLANT: ICD-10-CM

## 2024-02-01 PROCEDURE — 36415 COLL VENOUS BLD VENIPUNCTURE: CPT | Mod: PO | Performed by: INTERNAL MEDICINE

## 2024-02-01 PROCEDURE — 80197 ASSAY OF TACROLIMUS: CPT | Performed by: INTERNAL MEDICINE

## 2024-02-01 PROCEDURE — 80069 RENAL FUNCTION PANEL: CPT | Performed by: INTERNAL MEDICINE

## 2024-02-02 LAB
ALBUMIN SERPL BCP-MCNC: 3.8 G/DL (ref 3.5–5.2)
ANION GAP SERPL CALC-SCNC: 9 MMOL/L (ref 8–16)
BUN SERPL-MCNC: 17 MG/DL (ref 8–23)
CALCIUM SERPL-MCNC: 9.9 MG/DL (ref 8.7–10.5)
CHLORIDE SERPL-SCNC: 106 MMOL/L (ref 95–110)
CO2 SERPL-SCNC: 26 MMOL/L (ref 23–29)
CREAT SERPL-MCNC: 1.2 MG/DL (ref 0.5–1.4)
EST. GFR  (NO RACE VARIABLE): 50.2 ML/MIN/1.73 M^2
GLUCOSE SERPL-MCNC: 146 MG/DL (ref 70–110)
PHOSPHATE SERPL-MCNC: 3.7 MG/DL (ref 2.7–4.5)
POTASSIUM SERPL-SCNC: 4.4 MMOL/L (ref 3.5–5.1)
SODIUM SERPL-SCNC: 141 MMOL/L (ref 136–145)
TACROLIMUS BLD-MCNC: 4.3 NG/ML (ref 5–15)

## 2024-02-08 DIAGNOSIS — Z94.0 KIDNEY REPLACED BY TRANSPLANT: ICD-10-CM

## 2024-02-08 RX ORDER — TACROLIMUS 1 MG/1
1 CAPSULE ORAL EVERY 12 HOURS
Qty: 60 CAPSULE | Refills: 11 | Status: CANCELLED | OUTPATIENT
Start: 2024-02-08 | End: 2025-02-07

## 2024-02-08 RX ORDER — TACROLIMUS 1 MG/1
1 CAPSULE ORAL EVERY 12 HOURS
Qty: 60 CAPSULE | Refills: 11 | Status: SHIPPED | OUTPATIENT
Start: 2024-02-08 | End: 2025-02-07

## 2024-02-15 DIAGNOSIS — E79.0 HYPERURICEMIA: ICD-10-CM

## 2024-02-16 RX ORDER — ALLOPURINOL 100 MG/1
100 TABLET ORAL
Qty: 90 TABLET | Refills: 2 | Status: SHIPPED | OUTPATIENT
Start: 2024-02-16

## 2024-02-16 NOTE — TELEPHONE ENCOUNTER
No care due was identified.  [facility] Embedded Care Due Messages. Reference number: [id number].   2/16/2024 3:12:22 AM CST

## 2024-02-16 NOTE — TELEPHONE ENCOUNTER
Refill Decision Note   Andra Newell  is requesting a refill authorization.  Brief Assessment and Rationale for Refill:  Approve     Medication Therapy Plan:         Comments:     Note composed:3:13 AM 02/16/2024

## 2024-02-21 ENCOUNTER — LAB VISIT (OUTPATIENT)
Dept: LAB | Facility: HOSPITAL | Age: 64
End: 2024-02-21
Attending: INTERNAL MEDICINE
Payer: MEDICARE

## 2024-02-21 DIAGNOSIS — Z94.0 KIDNEY REPLACED BY TRANSPLANT: ICD-10-CM

## 2024-02-21 LAB
ALBUMIN SERPL BCP-MCNC: 3.9 G/DL (ref 3.5–5.2)
ANION GAP SERPL CALC-SCNC: 6 MMOL/L (ref 8–16)
BACTERIA #/AREA URNS HPF: ABNORMAL /HPF
BILIRUB UR QL STRIP: NEGATIVE
BUN SERPL-MCNC: 22 MG/DL (ref 8–23)
CALCIUM SERPL-MCNC: 10 MG/DL (ref 8.7–10.5)
CHLORIDE SERPL-SCNC: 104 MMOL/L (ref 95–110)
CLARITY UR: CLEAR
CO2 SERPL-SCNC: 28 MMOL/L (ref 23–29)
COLOR UR: YELLOW
CREAT SERPL-MCNC: 1.1 MG/DL (ref 0.5–1.4)
CREAT UR-MCNC: 108 MG/DL (ref 15–325)
EST. GFR  (NO RACE VARIABLE): 55.4 ML/MIN/1.73 M^2
GLUCOSE SERPL-MCNC: 183 MG/DL (ref 70–110)
GLUCOSE UR QL STRIP: NEGATIVE
HGB UR QL STRIP: NEGATIVE
KETONES UR QL STRIP: NEGATIVE
LEUKOCYTE ESTERASE UR QL STRIP: ABNORMAL
MICROSCOPIC COMMENT: ABNORMAL
NITRITE UR QL STRIP: NEGATIVE
PH UR STRIP: 6 [PH] (ref 5–8)
PHOSPHATE SERPL-MCNC: 2.8 MG/DL (ref 2.7–4.5)
POTASSIUM SERPL-SCNC: 4.9 MMOL/L (ref 3.5–5.1)
PROT UR QL STRIP: NEGATIVE
PROT UR-MCNC: 10 MG/DL (ref 0–15)
PROT/CREAT UR: 0.09 MG/G{CREAT} (ref 0–0.2)
PTH-INTACT SERPL-MCNC: 54.1 PG/ML (ref 9–77)
SODIUM SERPL-SCNC: 138 MMOL/L (ref 136–145)
SP GR UR STRIP: 1.02 (ref 1–1.03)
SQUAMOUS #/AREA URNS HPF: 2 /HPF
URN SPEC COLLECT METH UR: ABNORMAL
WBC #/AREA URNS HPF: 3 /HPF (ref 0–5)

## 2024-02-21 PROCEDURE — 80069 RENAL FUNCTION PANEL: CPT | Performed by: INTERNAL MEDICINE

## 2024-02-21 PROCEDURE — 84156 ASSAY OF PROTEIN URINE: CPT | Performed by: INTERNAL MEDICINE

## 2024-02-21 PROCEDURE — 80197 ASSAY OF TACROLIMUS: CPT | Performed by: INTERNAL MEDICINE

## 2024-02-21 PROCEDURE — 81000 URINALYSIS NONAUTO W/SCOPE: CPT | Mod: PO | Performed by: INTERNAL MEDICINE

## 2024-02-21 PROCEDURE — 36415 COLL VENOUS BLD VENIPUNCTURE: CPT | Mod: PO | Performed by: INTERNAL MEDICINE

## 2024-02-21 PROCEDURE — 83970 ASSAY OF PARATHORMONE: CPT | Performed by: INTERNAL MEDICINE

## 2024-02-22 ENCOUNTER — OFFICE VISIT (OUTPATIENT)
Dept: NEPHROLOGY | Facility: CLINIC | Age: 64
End: 2024-02-22
Payer: MEDICARE

## 2024-02-22 ENCOUNTER — PATIENT MESSAGE (OUTPATIENT)
Dept: NEPHROLOGY | Facility: CLINIC | Age: 64
End: 2024-02-22

## 2024-02-22 VITALS
DIASTOLIC BLOOD PRESSURE: 80 MMHG | OXYGEN SATURATION: 96 % | BODY MASS INDEX: 28.61 KG/M2 | WEIGHT: 178 LBS | HEIGHT: 66 IN | HEART RATE: 78 BPM | SYSTOLIC BLOOD PRESSURE: 126 MMHG

## 2024-02-22 DIAGNOSIS — Z79.899 IMMUNOSUPPRESSION DUE TO DRUG THERAPY: ICD-10-CM

## 2024-02-22 DIAGNOSIS — Z94.0 KIDNEY REPLACED BY TRANSPLANT: Primary | ICD-10-CM

## 2024-02-22 DIAGNOSIS — D84.821 IMMUNOSUPPRESSION DUE TO DRUG THERAPY: ICD-10-CM

## 2024-02-22 LAB — TACROLIMUS BLD-MCNC: 10.8 NG/ML (ref 5–15)

## 2024-02-22 PROCEDURE — 99214 OFFICE O/P EST MOD 30 MIN: CPT | Mod: S$GLB,,, | Performed by: INTERNAL MEDICINE

## 2024-02-22 PROCEDURE — 99999 PR PBB SHADOW E&M-EST. PATIENT-LVL V: CPT | Mod: PBBFAC,,, | Performed by: INTERNAL MEDICINE

## 2024-02-22 NOTE — PROGRESS NOTES
"  Subjective:       Patient ID: Andra Newell is a 64 y.o. White female who presents for return patient evaluation for chronic renal failure.      She is s/p kidney transplant in 2019.  She is on prograf 1/1.  She had her back pain pump removed and is having more back pain lately.      Review of Systems   Constitutional:  Negative for appetite change, chills and fever.   HENT:  Negative for congestion.    Eyes:  Negative for visual disturbance.   Respiratory:  Negative for cough and shortness of breath.    Cardiovascular:  Negative for chest pain and leg swelling.   Gastrointestinal:  Negative for abdominal pain, diarrhea, nausea and vomiting.   Genitourinary:  Negative for difficulty urinating, dysuria and hematuria.   Musculoskeletal:  Positive for arthralgias (R knee) and back pain. Negative for myalgias.   Skin:  Negative for rash.   Neurological:  Positive for tremors (L hand). Negative for headaches.   Psychiatric/Behavioral:  Negative for sleep disturbance.        The past medical, family and social histories were reviewed for this encounter.     /80 (BP Location: Right arm, Patient Position: Sitting)   Pulse 78   Ht 5' 6" (1.676 m)   Wt 80.7 kg (178 lb)   LMP 02/28/2012   SpO2 96%   BMI 28.73 kg/m²     Objective:      Physical Exam  Vitals reviewed.   Constitutional:       General: She is not in acute distress.     Appearance: She is well-developed.   HENT:      Head: Normocephalic and atraumatic.   Eyes:      General: No scleral icterus.     Conjunctiva/sclera: Conjunctivae normal.   Neck:      Vascular: No JVD.   Cardiovascular:      Rate and Rhythm: Normal rate and regular rhythm.      Heart sounds: Normal heart sounds. No murmur heard.     No friction rub. No gallop.   Pulmonary:      Effort: Pulmonary effort is normal. No respiratory distress.      Breath sounds: Normal breath sounds. No wheezing.   Abdominal:      General: Bowel sounds are normal. There is no distension.      " Palpations: Abdomen is soft.      Tenderness: There is no abdominal tenderness.   Musculoskeletal:      Cervical back: Normal range of motion.      Right lower leg: No edema.      Left lower leg: No edema.      Comments: LUE AVF with one aneurysmal area with bruit   Skin:     General: Skin is warm and dry.      Findings: No rash.   Neurological:      Mental Status: She is alert and oriented to person, place, and time.   Psychiatric:         Mood and Affect: Mood normal.         Behavior: Behavior normal.         Assessment:       1. Kidney replaced by transplant    2. Immunosuppression due to drug therapy        Lab Results   Component Value Date    CREATININE 1.1 02/21/2024    BUN 22 02/21/2024     02/21/2024    K 4.9 02/21/2024     02/21/2024    CO2 28 02/21/2024     Lab Results   Component Value Date    PTH 54.1 02/21/2024    CALCIUM 10.0 02/21/2024    PHOS 2.8 02/21/2024     Lab Results   Component Value Date    HCT 40.3 01/16/2024     Prot/Creat Ratio, Urine   Date Value Ref Range Status   02/21/2024 0.09 0.00 - 0.20 Final   01/16/2024 0.07 0.00 - 0.20 Final   10/17/2023 0.10 0.00 - 0.20 Final     Plan:   Return to clinic in 4 months.  Labs for next visit include labs per standing orders.    Baseline creatinine is 0.9-1.0 since her transplant..  PG level is 10.8.  PTH is 54 with  calcium of 10.0.  UPC is negative.  She needs a Derm referral for skin survey.

## 2024-03-06 ENCOUNTER — PATIENT MESSAGE (OUTPATIENT)
Dept: PRIMARY CARE CLINIC | Facility: CLINIC | Age: 64
End: 2024-03-06
Payer: MEDICARE

## 2024-03-20 ENCOUNTER — TELEPHONE (OUTPATIENT)
Dept: CARDIOLOGY | Facility: CLINIC | Age: 64
End: 2024-03-20
Payer: MEDICARE

## 2024-03-20 NOTE — TELEPHONE ENCOUNTER
Pt scheduled for dental work. Pt taking asa and plavix. Does patient need to be pre-medicated? Please advise.    [facility]   P- [phone]  F- [phone]

## 2024-03-21 ENCOUNTER — OFFICE VISIT (OUTPATIENT)
Dept: PRIMARY CARE CLINIC | Facility: CLINIC | Age: 64
End: 2024-03-21
Payer: MEDICARE

## 2024-03-21 VITALS
HEART RATE: 96 BPM | HEIGHT: 66 IN | WEIGHT: 182.44 LBS | BODY MASS INDEX: 29.32 KG/M2 | OXYGEN SATURATION: 98 % | DIASTOLIC BLOOD PRESSURE: 78 MMHG | SYSTOLIC BLOOD PRESSURE: 124 MMHG

## 2024-03-21 DIAGNOSIS — Z79.60 LONG-TERM USE OF IMMUNOSUPPRESSANT MEDICATION: Primary | ICD-10-CM

## 2024-03-21 DIAGNOSIS — I12.9 HYPERTENSION ASSOCIATED WITH STAGE 3 CHRONIC KIDNEY DISEASE DUE TO TYPE 2 DIABETES MELLITUS: Chronic | ICD-10-CM

## 2024-03-21 DIAGNOSIS — E11.22 HYPERTENSION ASSOCIATED WITH STAGE 3 CHRONIC KIDNEY DISEASE DUE TO TYPE 2 DIABETES MELLITUS: Chronic | ICD-10-CM

## 2024-03-21 DIAGNOSIS — R29.898 WEAKNESS OF BOTH LOWER EXTREMITIES: ICD-10-CM

## 2024-03-21 DIAGNOSIS — Z79.4 CONTROLLED TYPE 2 DIABETES MELLITUS WITH COMPLICATION, WITH LONG-TERM CURRENT USE OF INSULIN: Chronic | ICD-10-CM

## 2024-03-21 DIAGNOSIS — R25.1 TREMOR: ICD-10-CM

## 2024-03-21 DIAGNOSIS — N18.30 HYPERTENSION ASSOCIATED WITH STAGE 3 CHRONIC KIDNEY DISEASE DUE TO TYPE 2 DIABETES MELLITUS: Chronic | ICD-10-CM

## 2024-03-21 DIAGNOSIS — E11.8 CONTROLLED TYPE 2 DIABETES MELLITUS WITH COMPLICATION, WITH LONG-TERM CURRENT USE OF INSULIN: Chronic | ICD-10-CM

## 2024-03-21 PROCEDURE — 99999 PR PBB SHADOW E&M-EST. PATIENT-LVL IV: CPT | Mod: PBBFAC,,, | Performed by: FAMILY MEDICINE

## 2024-03-21 PROCEDURE — 99214 OFFICE O/P EST MOD 30 MIN: CPT | Mod: S$GLB,,, | Performed by: FAMILY MEDICINE

## 2024-03-21 RX ORDER — LIDOCAINE HYDROCHLORIDE 20 MG/ML
SOLUTION ORAL; TOPICAL
COMMUNITY
Start: 2024-01-02

## 2024-03-21 NOTE — PROGRESS NOTES
Primary Care Provider Appointment   [facility]       Patient ID: Andra Newell is a 64 y.o. female.    ASSESSMENT/PLAN by Problem List:    1. Long-term use of immunosuppressant medication  -     Ambulatory referral/consult to Dermatology; Future; Expected date: 03/28/2024    2. Controlled type 2 diabetes mellitus with complication, with long-term current use of insulin  Assessment & Plan:  Last A1c did increase but still reasonable.  She has labs and follow-up with Endocrinology scheduled.      3. Essential hypertension   Assessment & Plan:  Stable and satisfactory.  Continue current medications.      4. Weakness of both lower extremities  Assessment & Plan:  Likely multifactorial.  She did have one fall with an exacerbation of her back pain but no acute neurological deficits other than some generalized weakness.  Offered PT but cost and co-pay is a concern.  So I have referred her to our health  here to recommend a gentle exercise program, starting with an exercise bike.  However if she has difficulty with this I would want a referral to physical therapy and possible referral back to her neurologist.  She does have a follow-up with her pain doctor and states they are planning an MRI of her lumbar spine she will keep me informed of the results      5. Tremor  Assessment & Plan:  Patient has noticed some worsening of the tremor particularly in the left arm.  She did consult with Neurology last year and they felt it was multifactorial.  She is concerned about the Vraylar although I assured her I do not see signs of tardive dyskinesia.  She has a rapid tremor short amplitude very possibly essential tremor.  We did discuss that there are medication options that might help such as propranolol or primidone.  But symptoms are not bad enough right now.  She will monitor.  If there is any significant change or worsening I would also offer referral back to Neurology        "    Follow Up:  Three months      Subjective:     Chief Complaint   Patient presents with    Follow-up     I have reviewed the information entered by the ancillary staff regarding the chief complaint as well as the related history.    HPI    Patient is a/an 64 y.o.  female       Fell few weeks ago, exacerbation of back pain.  Some leg weakness.  Bilaterally.  She has had an increase in her back pain since removal of the pain pump and has a follow with her pain physician.    Tremor left arm worsening, on Vrylar, tremor is not c/w TD, more c/w ET      For complete problem list, past medical history, surgical history, social history, etc., see appropriate section in the electronic medical record    Review of Systems   Constitutional:  Negative for fever.   Respiratory: Negative.     Cardiovascular: Negative.    Musculoskeletal:  Positive for back pain.   Neurological:  Positive for tremors and weakness.       Objective     Physical Exam  HENT:      Head: Normocephalic and atraumatic.   Eyes:      General: No scleral icterus.     Conjunctiva/sclera: Conjunctivae normal.   Cardiovascular:      Rate and Rhythm: Normal rate and regular rhythm.      Heart sounds: Murmur heard.      Diastolic murmur is present with a grade of 2/4.      No friction rub.      Comments: There is no peripheral edema.  Pulmonary:      Effort: Pulmonary effort is normal. No respiratory distress.      Breath sounds: Normal breath sounds. No wheezing or rales.   Neurological:      Comments: Bilateral hand tremor more pronounced on the left side, very subtle on the right.  Worse with intention.  Amplitude is short, frequency is rapid  She is ambulating without difficulty.   Psychiatric:         Mood and Affect: Mood normal.       Vitals:    03/21/24 0801   BP: 124/78   BP Location: Right arm   Patient Position: Sitting   BP Method: Medium (Manual)   Pulse: 96   SpO2: 98%   Weight: 82.7 kg (182 lb 6.9 oz)   Height: 5' 6" (1.676 m)           THIS " DOCUMENT WAS MADE IN PART WITH VOICE RECOGNITION SOFTWARE.  OCCASIONALLY THIS SOFTWARE WILL MISINTERPRET WORDS OR PHRASES.

## 2024-04-03 ENCOUNTER — TELEPHONE (OUTPATIENT)
Dept: PRIMARY CARE CLINIC | Facility: CLINIC | Age: 64
End: 2024-04-03
Payer: MEDICARE

## 2024-04-03 NOTE — TELEPHONE ENCOUNTER
Called pt for a status update, she reports that she took a fall a couple of weeks ago and that she has been struggling with her back, more so than before.  Pt is taking Norco for pain and this is relieving her pain.  Pt did not get medical attention when she fell.  Pt is getting an MRI next week (when she gets back in town from [place]) for her new pain management doctor, Dr. Morgan Pearce with [facility].      Pt reports that her blood sugar has been spiking and dropping.  Pt confirms that she wears a Dexcom and that she changes it every 10 days.  Her spikes are reaching 300, and come down quickly after eating with taking 14-18 units after eating and typically gets down to 170, but has occasionally been getting down to 70.  Pt reports that she sees Mana Webber DNP on 5/8 and she is scheduled to have bloodwork prior to this appt.     Please advise if necessary.

## 2024-05-01 ENCOUNTER — TELEPHONE (OUTPATIENT)
Dept: PRIMARY CARE CLINIC | Facility: CLINIC | Age: 64
End: 2024-05-01
Payer: MEDICARE

## 2024-05-01 ENCOUNTER — LAB VISIT (OUTPATIENT)
Dept: LAB | Facility: HOSPITAL | Age: 64
End: 2024-05-01
Attending: NURSE PRACTITIONER
Payer: MEDICARE

## 2024-05-01 DIAGNOSIS — Z79.4 TYPE 2 DIABETES MELLITUS WITH STAGE 3A CHRONIC KIDNEY DISEASE, WITH LONG-TERM CURRENT USE OF INSULIN: ICD-10-CM

## 2024-05-01 DIAGNOSIS — Z94.0 KIDNEY REPLACED BY TRANSPLANT: ICD-10-CM

## 2024-05-01 DIAGNOSIS — N18.31 TYPE 2 DIABETES MELLITUS WITH STAGE 3A CHRONIC KIDNEY DISEASE, WITH LONG-TERM CURRENT USE OF INSULIN: ICD-10-CM

## 2024-05-01 DIAGNOSIS — E03.9 ACQUIRED HYPOTHYROIDISM: Chronic | ICD-10-CM

## 2024-05-01 DIAGNOSIS — E11.22 TYPE 2 DIABETES MELLITUS WITH STAGE 3A CHRONIC KIDNEY DISEASE, WITH LONG-TERM CURRENT USE OF INSULIN: ICD-10-CM

## 2024-05-01 LAB
BACTERIA #/AREA URNS HPF: ABNORMAL /HPF
BILIRUB UR QL STRIP: NEGATIVE
CLARITY UR: CLEAR
COLOR UR: YELLOW
CREAT UR-MCNC: 101 MG/DL (ref 15–325)
ESTIMATED AVG GLUCOSE: 163 MG/DL (ref 68–131)
GLUCOSE UR QL STRIP: NEGATIVE
HBA1C MFR BLD: 7.3 % (ref 4–5.6)
HGB UR QL STRIP: NEGATIVE
KETONES UR QL STRIP: NEGATIVE
LEUKOCYTE ESTERASE UR QL STRIP: ABNORMAL
MICROSCOPIC COMMENT: ABNORMAL
NITRITE UR QL STRIP: NEGATIVE
PH UR STRIP: 7 [PH] (ref 5–8)
PROT UR QL STRIP: NEGATIVE
PROT UR-MCNC: 9 MG/DL (ref 0–15)
PROT/CREAT UR: 0.09 MG/G{CREAT} (ref 0–0.2)
PTH-INTACT SERPL-MCNC: 68.2 PG/ML (ref 9–77)
SP GR UR STRIP: 1.02 (ref 1–1.03)
SQUAMOUS #/AREA URNS HPF: 2 /HPF
TSH SERPL DL<=0.005 MIU/L-ACNC: 0.5 UIU/ML (ref 0.4–4)
URN SPEC COLLECT METH UR: ABNORMAL
WBC #/AREA URNS HPF: 10 /HPF (ref 0–5)

## 2024-05-01 PROCEDURE — 81000 URINALYSIS NONAUTO W/SCOPE: CPT | Mod: PO | Performed by: INTERNAL MEDICINE

## 2024-05-01 PROCEDURE — 80197 ASSAY OF TACROLIMUS: CPT | Performed by: INTERNAL MEDICINE

## 2024-05-01 PROCEDURE — 83036 HEMOGLOBIN GLYCOSYLATED A1C: CPT | Performed by: NURSE PRACTITIONER

## 2024-05-01 PROCEDURE — 84156 ASSAY OF PROTEIN URINE: CPT | Performed by: INTERNAL MEDICINE

## 2024-05-01 PROCEDURE — 84443 ASSAY THYROID STIM HORMONE: CPT | Performed by: NURSE PRACTITIONER

## 2024-05-01 PROCEDURE — 36415 COLL VENOUS BLD VENIPUNCTURE: CPT | Mod: PO | Performed by: NURSE PRACTITIONER

## 2024-05-01 PROCEDURE — 83970 ASSAY OF PARATHORMONE: CPT | Performed by: INTERNAL MEDICINE

## 2024-05-01 NOTE — TELEPHONE ENCOUNTER
----- Message from Loretta Noel sent at 5/1/2024 10:11 AM CDT -----  Contact: [phone] pt  [email or web address]     Patient is calling for Medical Advice regarding:pt is calling to let you know she will not be exercising tomorrow/Thursday with Alanna(she thinks) she do the work out  with. her back is pulled.    How long has patient had these symptoms:    Pharmacy name and phone#:    Would like response via [facility]:  callback    Comments:

## 2024-05-02 LAB — TACROLIMUS BLD-MCNC: 8.1 NG/ML (ref 5–15)

## 2024-05-05 NOTE — PROGRESS NOTES
"Subjective:    Patient ID:  Andra Newell is a 64 y.o. female who presents for follow-up of aortic atherosclerosis      Problem List Items Addressed This Visit          Cardiac/Vascular    Essential hypertension  (Chronic)    Mixed diabetic hyperlipidemia associated with type 2 diabetes mellitus (Chronic)    Aortic atherosclerosis - Primary    Atherosclerosis of native coronary artery of native heart with angina pectoris    Overview     [facility] 7/10/23:  Summary     The Dist RCA lesion was 50% stenosed.    The Dist LAD lesion was 65% stenosed.    The pre-procedure left ventricular end diastolic pressure was 10.    The estimated blood loss was none.    There was non-obstructive coronary artery disease..PATENT STENT IN DIAGONAL  Date: 7/10/2023  Time: 2:44 PM           Coronary artery disease involving native coronary artery of native heart without angina pectoris    Mixed hyperlipidemia    Pericarditis    Renovascular hypertension       HPI    Patient was last seen on 08/07/2023 at which time she was doing okay from a cardiac standpoint.  Had lost significant weight.    On assessment today, the patient states that she has been having issues with chest heaviness and some pain for the past 3 months. No relation to exertion.    No shortness of breath.  Status of weight loss - lost 8lbs!       Objective:     Vitals:    05/09/24 1039   BP: 135/81   BP Location: Right arm   Patient Position: Sitting   BP Method: Large (Automatic)   Pulse: 85   Weight: 85.9 kg (189 lb 6 oz)   Height: 5' 6" (1.676 m)       BP Readings from Last 5 Encounters:   05/09/24 135/81   05/08/24 116/66   03/21/24 124/78   02/22/24 126/80   01/22/24 120/70        Physical Exam  Vitals and nursing note reviewed.   Constitutional:       General: She is not in acute distress.     Appearance: She is well-developed.   HENT:      Head: Normocephalic and atraumatic.   Neck:      Vascular: No JVD.   Cardiovascular:      Rate and Rhythm: Normal rate and " regular rhythm.      Heart sounds: Normal heart sounds. No murmur heard.     No friction rub. No gallop.   Pulmonary:      Effort: Pulmonary effort is normal. No respiratory distress.      Breath sounds: Normal breath sounds. No wheezing or rales.   Abdominal:      General: Bowel sounds are normal.      Palpations: Abdomen is soft.      Tenderness: There is no abdominal tenderness. There is no guarding or rebound.   Musculoskeletal:         General: No tenderness.      Cervical back: Neck supple.   Skin:     General: Skin is warm and dry.   Neurological:      Mental Status: She is alert and oriented to person, place, and time.   Psychiatric:         Behavior: Behavior normal.             Current Outpatient Medications   Medication Instructions    (Magic mouthwash) 1:1:1 diphenhydrAMINE(Benadryl) 12.5mg/5ml liq, aluminum & magnesium hydroxide-simethicone (Maalox), LIDOcaine viscous 2% 10 cc swish and spit every 4 hours as needed for mouth sores or sore throat    acetaminophen (TYLENOL) 650 mg, Oral, Every 8 hours    allopurinoL (ZYLOPRIM) 100 mg, Oral    amLODIPine (NORVASC) 5 MG tablet 1 tablet, Oral, Daily    aspirin (ECOTRIN) 81 mg, Oral, Daily    azelastine (ASTELIN) 137 mcg, Nasal, 2 times daily PRN    benzonatate (TESSALON) 100 mg, Oral, 3 times daily PRN    blood sugar diagnostic Strp Check glucose 3 (three) times daily.    blood-glucose sensor (DEXCOM G7 SENSOR) Amparo Change every 10 days    clopidogreL (PLAVIX) 75 mg tablet Take one tablet by mouth daily    colchicine (COLCRYS) 0.6 mg, Oral, Daily    DEXCOM G7  Misc Dispense 1    doxazosin (CARDURA) 2 mg, Oral, Daily    DULoxetine (CYMBALTA) 30 mg, Oral, Daily    HYDROcodone-acetaminophen (NORCO)  mg per tablet 1 tablet, Oral, Every 24 hours as needed    hydroxychloroquine (PLAQUENIL) 200 mg, Oral, 2 times daily    insulin aspart U-100 (NOVOLOG FLEXPEN U-100 INSULIN) 100 unit/mL (3 mL) InPn pen 12 u AC + correction max TDD 45u    insulin  "degludec (TRESIBA FLEXTOUCH U-200) 200 unit/mL (3 mL) insulin pen 16 u qam    isosorbide mononitrate (IMDUR) 30 mg, Oral    lancets (ONETOUCH ULTRASOFT LANCETS) Misc Use to test blood sugar 3 x's a day    lansoprazole (PREVACID) 30 mg, Oral, 2 times daily    levothyroxine (SYNTHROID) 75 mcg, Oral, Daily    LIDOcaine (LIDODERM) 5 % 1 patch    LIDOCAINE VISCOUS 2 % solution     LORazepam (ATIVAN) 1 mg, Oral, Nightly    losartan (COZAAR) 50 MG tablet Take one tablet by mouth daily    magnesium oxide 500 mg, Oral, Daily    metFORMIN (GLUCOPHAGE-XR) 500 mg, Oral, 2 times daily with meals    metoprolol tartrate (LOPRESSOR) 12.5 mg, Oral, 2 times daily    multivitamin (THERAGRAN) tablet 1 tablet, Oral, Daily    nitroGLYCERIN (NITROSTAT) 0.4 mg, Sublingual, Every 5 min PRN    ondansetron (ZOFRAN) 4 MG tablet TAKE ONE TABLET BY MOUTH EVERY SIX HOURS    pen needle, diabetic (BD LENIN 2ND GEN PEN NEEDLE) 32 gauge x 5/32" Ndle Uses 4 daily    rosuvastatin (CRESTOR) 20 mg, Oral, Daily    sucralfate (CARAFATE) 1 g, Oral, Before meals & nightly    tacrolimus (PROGRAF) 1 mg, Oral, Every 12 hours    tiZANidine (ZANAFLEX) 4 MG tablet 1 tablet, Oral, Nightly    vilazodone (VIIBRYD) 40 mg, Oral, Daily    VRAYLAR 3 mg, Oral, Daily       Lipid Panel:   Lab Results   Component Value Date    CHOL 81 (L) 04/26/2023    HDL 27 (L) 04/26/2023    LDLCALC 27.6 (L) 04/26/2023    TRIG 132 04/26/2023    CHOLHDL 33.3 04/26/2023       The ASCVD Risk score (Laura DK, et al., 2019) failed to calculate for the following reasons:    The valid total cholesterol range is 130 to 320 mg/dL    Most Recent EKG Results  Results for orders placed or performed during the hospital encounter of 07/07/23   EKG 12-lead    Collection Time: 07/07/23  5:13 PM    Narrative    Test Reason : R07.9,    Vent. Rate : 086 BPM     Atrial Rate : 086 BPM     P-R Int : 170 ms          QRS Dur : 092 ms      QT Int : 374 ms       P-R-T Axes : 046 -54 074 degrees     QTc Int : 447 " ms    Normal sinus rhythm  Incomplete right bundle branch block  Left anterior fascicular block  Voltage criteria for left ventricular hypertrophy  Cannot rule out Septal infarct ,age undetermined  Abnormal ECG  When compared with ECG of 21-JUN-2023 09:22,  Minimal criteria for Septal infarct are now Present  Confirmed by Clint Garcia MD ([id number]) on 7/10/2023 5:40:13 PM    Referred By: [email or web address]   SELF           Confirmed By:Clint Garcia MD       Most Recent Echocardiogram Results  Results for orders placed during the hospital encounter of 07/07/23    Echo    Interpretation Summary  · The left ventricle is normal in size with concentric hypertrophy and normal systolic function.  · The estimated ejection fraction is 60%.  · Normal left ventricular diastolic function.  · Normal right ventricular size with normal right ventricular systolic function.  · Mild-to-moderate aortic regurgitation.      Most Recent Nuclear Stress Test Results  Results for orders placed during the hospital encounter of 04/05/23    Nuclear Stress - Cardiology Interpreted    Interpretation Summary    Normal myocardial perfusion scan. There is no evidence of myocardial ischemia or infarction.    There is a  mild intensity fixed perfusion abnormality in the inferobasilar wall of the left ventricle secondary to diaphragm attenuation.    The gated perfusion images showed an ejection fraction of 63% at rest.    There is normal wall motion at rest.    The ECG portion of the study is negative for ischemia.    The patient reported no chest pain during the stress test.    There were no arrhythmias during stress.      Most Recent Cardiac PET Stress Test Results  No results found for this or any previous visit.      Most Recent Cardiovascular Angiogram results  Results for orders placed during the hospital encounter of 07/07/23    Cardiac catheterization    Conclusion    The Dist RCA lesion was 50% stenosed.    The Dist LAD lesion was 65% stenosed.     The pre-procedure left ventricular end diastolic pressure was 10.    The estimated blood loss was none.    There was non-obstructive coronary artery disease..PATENT STENT IN DIAGONAL    The procedure log was documented by Documenter: RT Rosa and verified by Ajay Hunter MD.    Date: 7/10/2023  Time: 2:44 PM      Other Most Recent Cardiology Results  Results for orders placed during the hospital encounter of 07/07/23    CARDIAC MONITORING STRIPS        All pertinent data including labs, imaging, EKGs, and studies listed above were reviewed.  Patient's most recent EKG tracing was personally interpreted by this provider.    Assessment:       1. Aortic atherosclerosis    2. Atherosclerosis of native coronary artery of native heart with angina pectoris    3. Coronary artery disease involving native coronary artery of native heart without angina pectoris    4. Essential hypertension     5. Mixed diabetic hyperlipidemia associated with type 2 diabetes mellitus    6. Pericarditis, unspecified chronicity, unspecified type    7. Mixed hyperlipidemia    8. Renovascular hypertension         Plan for treatment of the above diagnoses:     Symptoms of atypical chest pain  BP/Pulse OK today  Most recent echocardiogram reviewed personally     Lipid panel   Cardiac PET stress  Continue amlodipine 5 mg PO Daily  Continue aspirin 81 mg PO Daily  Continue Plavix 75 mg PO Daily  Continue doxazosin 2 mg PO Daily  Continue Imdur 30 mg PO Daily  Continue losartan 50 mg PO Daily  Continue metoprolol tartrate 12.5 mg PO BID  Continue rosuvastatin 20 mg PO Daily  Strict ED precautions    Continue other cardiac medications  Mediterranean Diet/Cardiovascular Exercise Program    Patient queried and all questions were answered.    F/u in 9-12 months to reassess      Signed:    Bryant Werner MD  5/9/2024 2:46 PM

## 2024-05-07 LAB
LEFT EYE DM RETINOPATHY: NEGATIVE
RIGHT EYE DM RETINOPATHY: NEGATIVE

## 2024-05-07 NOTE — PROGRESS NOTES
CC: This 64 y.o. female  presents for management of diabetes  and chronic conditions pending review including HTN, HLP, ESRD s/p kidney txp 01/14/2019, hypothyroidism, vitamin d deficiency, obesity      HPI: She  was diagnosed with T2DM in 2005. Has never been hospitalized r/t DM.  Family hx of DM: grandmother    No acute events since her last visit    Wearing Dexcom G7 sensor- see download in Media tab;  12% Very High  29% High  58% In Range  <1% Low  0% Very Low  GMI 7.7%  Having pp excursions after brunch and supper     Diet: Eats 2-3 Meals a day, snacks-     B- [employer] raisin cream cake   L- TV dinner or cheese and crackers   D- home cooked- hamburger helper last night   Exercise: none but will be meeting with the  soon   CURRENT DM MEDS: Tresiba 16 u qam, Novolog 10 u Ac + correction metformin xr 500 mg bid  Timing prandial insulin 5-15 minutes before meals: yes  Vial/pen:  Uses pens    Standards of Care:  Eye exam: Dr. Ortega 5/7/2024- will need cataract sx on both bilaterally     Regarding hypothyroidism   Fmh mom  Takes LT4 with all her other meds   No hoarseness, voice changes, +dysphagia- following w GI, no compressive symptoms, or head/neck exposure to XRT.   No personal or FH of thyroid cancer or MEN syndrome.   Not taking biotin.   + tremors of the hands  + intolerance to the heat    + hair loss    Heaving chest heaviness- seeing Dr Terrell tomorrow, has been on-gpoing for past 3 months  ROS: Gen: Appetite good  Eyes: Denies visual disturbances  Resp: no SOB  Cardiac:  palpitations- rarely notices when she's anxious ,  No chest pain-   GI: no nausea, vomiting,  + diarrhea     /GYN: no nocturia, no burning or pain.   MS/Neuro: Denies numbness/ tingling in BLE; Gait steady, speech clear    PE:  GENERAL: Well developed, well nourished.  PSYCH: AAOx3, appropriate mood and affect, pleasant expression, conversant, appears relaxed, well groomed.   EYES: Conjunctiva, corneas clear  NECK: Supple,  "trachea midline,   NEURO: Gait steady  SKIN:   no acanthosis nigracans.  FOOT EXAMINATION:  5/8/2024  No foot deformity, corns or callus formation,  nails in good condition and well trimmed, no interspace maceration or ulceration noted.  Decreased hair growth present over toes/feet.  Protective sensation intact with 10 gram monofilament.  +2 dorsalis pedis and posterior pulses noted.     Personally reviewed Past Medical, Surgical, Social History.    /66 (BP Location: Right arm, Patient Position: Sitting, BP Method: Medium (Manual))   Pulse 72   Ht 5' 6" (1.676 m)   Wt 86.2 kg (190 lb 0.6 oz)   LMP 02/28/2012   SpO2 97%   BMI 30.67 kg/m²      Personally reviewed the below labs:      Chemistry        Component Value Date/Time     02/21/2024 1013    K 4.9 02/21/2024 1013     02/21/2024 1013    CO2 28 02/21/2024 1013    BUN 22 02/21/2024 1013    CREATININE 1.1 02/21/2024 1013     (H) 02/21/2024 1013        Component Value Date/Time    CALCIUM 10.0 02/21/2024 1013    ALKPHOS 171 (H) 01/16/2024 1132    AST 39 01/16/2024 1132    ALT 45 (H) 01/16/2024 1132    BILITOT 0.5 01/16/2024 1132    ESTGFRAFRICA >60.0 06/09/2022 0954    EGFRNONAA 59.7 (A) 06/09/2022 0954            Lab Results   Component Value Date    TSH 0.499 05/01/2024       Recent Labs   Lab 04/26/23  1351   LDL Cholesterol 27.6 L   HDL 27 L   Cholesterol 81 L        Results for orders placed or performed in visit on 11/17/21   Vitamin D   Result Value Ref Range    Vit D, 25-Hydroxy 42 30 - 96 ng/mL     No results found. However, due to the size of the patient record, not all encounters were searched. Please check Results Review for a complete set of results.    Lab Results   Component Value Date    MICALBCREAT 8.1 01/04/2023       Hemoglobin A1C   Date Value Ref Range Status   05/01/2024 7.3 (H) 4.0 - 5.6 % Final     Comment:     ADA Screening Guidelines:  5.7-6.4%  Consistent with prediabetes  >or=6.5%  Consistent with " diabetes    High levels of fetal hemoglobin interfere with the HbA1C  assay. Heterozygous hemoglobin variants (HbS, HgC, etc)do  not significantly interfere with this assay.   However, presence of multiple variants may affect accuracy.     11/07/2023 7.6 (H) 4.0 - 5.6 % Final     Comment:     ADA Screening Guidelines:  5.7-6.4%  Consistent with prediabetes  >or=6.5%  Consistent with diabetes    High levels of fetal hemoglobin interfere with the HbA1C  assay. Heterozygous hemoglobin variants (HbS, HgC, etc)do  not significantly interfere with this assay.   However, presence of multiple variants may affect accuracy.     06/30/2023 6.7 (H) 4.0 - 5.6 % Final     Comment:     ADA Screening Guidelines:  5.7-6.4%  Consistent with prediabetes  >or=6.5%  Consistent with diabetes    High levels of fetal hemoglobin interfere with the HbA1C  assay. Heterozygous hemoglobin variants (HbS, HgC, etc)do  not significantly interfere with this assay.   However, presence of multiple variants may affect accuracy.          ASSESSMENT and PLAN:      1. 1. T2DM with hyperglycemia, CKD 3-    Continue  metformin xr 500 mg bid, Tresiba U200 16 u qd, Increase Novolog to 12 u AC + correction 150 /25,   ONLY take Novolog 5-15 mins prior to a meal, if skipping a meal, skip Novolog  Continue Dexcom G7  Hold GLP-1 until she sees gastro as she's having so much nausea w episodes of vomiting  SGLT2 will need to be approved by [facility]/nephrology       2. HTN - controlled, continue meds as previously prescribed and monitor.      3. HLP - on statin therapy     4. S/p kidney txp- followed by [facility]     5.  Immunosuppression - agents may increase bg readings     6. Hypothyroidism - takes with all other meds, clinically euthyroid, TSH WNL      F/u in 3 months w A1C

## 2024-05-08 ENCOUNTER — OFFICE VISIT (OUTPATIENT)
Dept: ENDOCRINOLOGY | Facility: CLINIC | Age: 64
End: 2024-05-08
Payer: MEDICARE

## 2024-05-08 VITALS
SYSTOLIC BLOOD PRESSURE: 116 MMHG | DIASTOLIC BLOOD PRESSURE: 66 MMHG | WEIGHT: 190.06 LBS | HEIGHT: 66 IN | OXYGEN SATURATION: 97 % | BODY MASS INDEX: 30.54 KG/M2 | HEART RATE: 72 BPM

## 2024-05-08 DIAGNOSIS — I12.9 HYPERTENSION ASSOCIATED WITH STAGE 3 CHRONIC KIDNEY DISEASE DUE TO TYPE 2 DIABETES MELLITUS: Chronic | ICD-10-CM

## 2024-05-08 DIAGNOSIS — N18.31 TYPE 2 DIABETES MELLITUS WITH STAGE 3A CHRONIC KIDNEY DISEASE, WITH LONG-TERM CURRENT USE OF INSULIN: Primary | ICD-10-CM

## 2024-05-08 DIAGNOSIS — N18.30 HYPERTENSION ASSOCIATED WITH STAGE 3 CHRONIC KIDNEY DISEASE DUE TO TYPE 2 DIABETES MELLITUS: Chronic | ICD-10-CM

## 2024-05-08 DIAGNOSIS — E11.22 TYPE 2 DIABETES MELLITUS WITH STAGE 3A CHRONIC KIDNEY DISEASE, WITH LONG-TERM CURRENT USE OF INSULIN: Primary | ICD-10-CM

## 2024-05-08 DIAGNOSIS — E03.9 ACQUIRED HYPOTHYROIDISM: Chronic | ICD-10-CM

## 2024-05-08 DIAGNOSIS — E11.69 MIXED DIABETIC HYPERLIPIDEMIA ASSOCIATED WITH TYPE 2 DIABETES MELLITUS: Chronic | ICD-10-CM

## 2024-05-08 DIAGNOSIS — E66.9 OBESITY (BMI 30-39.9): Chronic | ICD-10-CM

## 2024-05-08 DIAGNOSIS — Z79.4 TYPE 2 DIABETES MELLITUS WITH STAGE 3A CHRONIC KIDNEY DISEASE, WITH LONG-TERM CURRENT USE OF INSULIN: Primary | ICD-10-CM

## 2024-05-08 DIAGNOSIS — I25.10 CORONARY ARTERY DISEASE INVOLVING NATIVE CORONARY ARTERY OF NATIVE HEART WITHOUT ANGINA PECTORIS: ICD-10-CM

## 2024-05-08 DIAGNOSIS — E11.22 HYPERTENSION ASSOCIATED WITH STAGE 3 CHRONIC KIDNEY DISEASE DUE TO TYPE 2 DIABETES MELLITUS: Chronic | ICD-10-CM

## 2024-05-08 DIAGNOSIS — E78.2 MIXED DIABETIC HYPERLIPIDEMIA ASSOCIATED WITH TYPE 2 DIABETES MELLITUS: Chronic | ICD-10-CM

## 2024-05-08 DIAGNOSIS — Z94.0 KIDNEY TRANSPLANT RECIPIENT: Chronic | ICD-10-CM

## 2024-05-08 PROCEDURE — 1159F MED LIST DOCD IN RCRD: CPT | Mod: CPTII,S$GLB,, | Performed by: NURSE PRACTITIONER

## 2024-05-08 PROCEDURE — 3074F SYST BP LT 130 MM HG: CPT | Mod: CPTII,S$GLB,, | Performed by: NURSE PRACTITIONER

## 2024-05-08 PROCEDURE — 1157F ADVNC CARE PLAN IN RCRD: CPT | Mod: CPTII,S$GLB,, | Performed by: NURSE PRACTITIONER

## 2024-05-08 PROCEDURE — 4010F ACE/ARB THERAPY RXD/TAKEN: CPT | Mod: CPTII,S$GLB,, | Performed by: NURSE PRACTITIONER

## 2024-05-08 PROCEDURE — 1101F PT FALLS ASSESS-DOCD LE1/YR: CPT | Mod: CPTII,S$GLB,, | Performed by: NURSE PRACTITIONER

## 2024-05-08 PROCEDURE — 3051F HG A1C>EQUAL 7.0%<8.0%: CPT | Mod: CPTII,S$GLB,, | Performed by: NURSE PRACTITIONER

## 2024-05-08 PROCEDURE — 99214 OFFICE O/P EST MOD 30 MIN: CPT | Mod: S$GLB,,, | Performed by: NURSE PRACTITIONER

## 2024-05-08 PROCEDURE — 3078F DIAST BP <80 MM HG: CPT | Mod: CPTII,S$GLB,, | Performed by: NURSE PRACTITIONER

## 2024-05-08 PROCEDURE — 3066F NEPHROPATHY DOC TX: CPT | Mod: CPTII,S$GLB,, | Performed by: NURSE PRACTITIONER

## 2024-05-08 PROCEDURE — 3288F FALL RISK ASSESSMENT DOCD: CPT | Mod: CPTII,S$GLB,, | Performed by: NURSE PRACTITIONER

## 2024-05-08 PROCEDURE — 3008F BODY MASS INDEX DOCD: CPT | Mod: CPTII,S$GLB,, | Performed by: NURSE PRACTITIONER

## 2024-05-08 PROCEDURE — 95251 CONT GLUC MNTR ANALYSIS I&R: CPT | Mod: S$GLB,,, | Performed by: NURSE PRACTITIONER

## 2024-05-08 PROCEDURE — 1125F AMNT PAIN NOTED PAIN PRSNT: CPT | Mod: CPTII,S$GLB,, | Performed by: NURSE PRACTITIONER

## 2024-05-08 RX ORDER — INSULIN ASPART 100 [IU]/ML
INJECTION, SOLUTION INTRAVENOUS; SUBCUTANEOUS
Qty: 30 ML | Refills: 4 | Status: SHIPPED | OUTPATIENT
Start: 2024-05-08

## 2024-05-08 RX ORDER — PEN NEEDLE, DIABETIC 30 GX3/16"
NEEDLE, DISPOSABLE MISCELLANEOUS
Qty: 400 EACH | Refills: 3 | Status: SHIPPED | OUTPATIENT
Start: 2024-05-08

## 2024-05-08 RX ORDER — INSULIN DEGLUDEC 200 U/ML
INJECTION, SOLUTION SUBCUTANEOUS
Qty: 2 PEN | Refills: 6 | Status: SHIPPED | OUTPATIENT
Start: 2024-05-08

## 2024-05-08 RX ORDER — LEVOTHYROXINE SODIUM 75 UG/1
75 TABLET ORAL DAILY
Qty: 90 TABLET | Refills: 3 | Status: SHIPPED | OUTPATIENT
Start: 2024-05-08

## 2024-05-09 ENCOUNTER — OFFICE VISIT (OUTPATIENT)
Dept: CARDIOLOGY | Facility: CLINIC | Age: 64
End: 2024-05-09
Payer: MEDICARE

## 2024-05-09 ENCOUNTER — LAB VISIT (OUTPATIENT)
Dept: LAB | Facility: HOSPITAL | Age: 64
End: 2024-05-09
Attending: INTERNAL MEDICINE
Payer: MEDICARE

## 2024-05-09 VITALS
DIASTOLIC BLOOD PRESSURE: 81 MMHG | HEIGHT: 66 IN | WEIGHT: 189.38 LBS | SYSTOLIC BLOOD PRESSURE: 135 MMHG | HEART RATE: 85 BPM | BODY MASS INDEX: 30.44 KG/M2

## 2024-05-09 DIAGNOSIS — E11.69 MIXED DIABETIC HYPERLIPIDEMIA ASSOCIATED WITH TYPE 2 DIABETES MELLITUS: Chronic | ICD-10-CM

## 2024-05-09 DIAGNOSIS — E78.2 MIXED DIABETIC HYPERLIPIDEMIA ASSOCIATED WITH TYPE 2 DIABETES MELLITUS: Chronic | ICD-10-CM

## 2024-05-09 DIAGNOSIS — I25.10 CORONARY ARTERY DISEASE INVOLVING NATIVE CORONARY ARTERY OF NATIVE HEART WITHOUT ANGINA PECTORIS: ICD-10-CM

## 2024-05-09 DIAGNOSIS — E11.22 HYPERTENSION ASSOCIATED WITH STAGE 3 CHRONIC KIDNEY DISEASE DUE TO TYPE 2 DIABETES MELLITUS: Chronic | ICD-10-CM

## 2024-05-09 DIAGNOSIS — E78.2 MIXED HYPERLIPIDEMIA: ICD-10-CM

## 2024-05-09 DIAGNOSIS — N18.30 HYPERTENSION ASSOCIATED WITH STAGE 3 CHRONIC KIDNEY DISEASE DUE TO TYPE 2 DIABETES MELLITUS: Chronic | ICD-10-CM

## 2024-05-09 DIAGNOSIS — I15.0 RENOVASCULAR HYPERTENSION: ICD-10-CM

## 2024-05-09 DIAGNOSIS — I31.9 PERICARDITIS, UNSPECIFIED CHRONICITY, UNSPECIFIED TYPE: ICD-10-CM

## 2024-05-09 DIAGNOSIS — I12.9 HYPERTENSION ASSOCIATED WITH STAGE 3 CHRONIC KIDNEY DISEASE DUE TO TYPE 2 DIABETES MELLITUS: Chronic | ICD-10-CM

## 2024-05-09 DIAGNOSIS — I25.119 ATHEROSCLEROSIS OF NATIVE CORONARY ARTERY OF NATIVE HEART WITH ANGINA PECTORIS: ICD-10-CM

## 2024-05-09 DIAGNOSIS — R07.89 ATYPICAL CHEST PAIN: ICD-10-CM

## 2024-05-09 DIAGNOSIS — I70.0 AORTIC ATHEROSCLEROSIS: Primary | ICD-10-CM

## 2024-05-09 LAB
CHOLEST SERPL-MCNC: 129 MG/DL (ref 120–199)
CHOLEST/HDLC SERPL: 3.6 {RATIO} (ref 2–5)
HDLC SERPL-MCNC: 36 MG/DL (ref 40–75)
HDLC SERPL: 27.9 % (ref 20–50)
LDLC SERPL CALC-MCNC: 58.8 MG/DL (ref 63–159)
NONHDLC SERPL-MCNC: 93 MG/DL
TRIGL SERPL-MCNC: 171 MG/DL (ref 30–150)

## 2024-05-09 PROCEDURE — 99999 PR PBB SHADOW E&M-EST. PATIENT-LVL IV: CPT | Mod: PBBFAC,,, | Performed by: INTERNAL MEDICINE

## 2024-05-09 PROCEDURE — 3066F NEPHROPATHY DOC TX: CPT | Mod: CPTII,S$GLB,, | Performed by: INTERNAL MEDICINE

## 2024-05-09 PROCEDURE — 1159F MED LIST DOCD IN RCRD: CPT | Mod: CPTII,S$GLB,, | Performed by: INTERNAL MEDICINE

## 2024-05-09 PROCEDURE — 3079F DIAST BP 80-89 MM HG: CPT | Mod: CPTII,S$GLB,, | Performed by: INTERNAL MEDICINE

## 2024-05-09 PROCEDURE — 3288F FALL RISK ASSESSMENT DOCD: CPT | Mod: CPTII,S$GLB,, | Performed by: INTERNAL MEDICINE

## 2024-05-09 PROCEDURE — 80061 LIPID PANEL: CPT | Performed by: INTERNAL MEDICINE

## 2024-05-09 PROCEDURE — 99214 OFFICE O/P EST MOD 30 MIN: CPT | Mod: S$GLB,,, | Performed by: INTERNAL MEDICINE

## 2024-05-09 PROCEDURE — 3008F BODY MASS INDEX DOCD: CPT | Mod: CPTII,S$GLB,, | Performed by: INTERNAL MEDICINE

## 2024-05-09 PROCEDURE — 4010F ACE/ARB THERAPY RXD/TAKEN: CPT | Mod: CPTII,S$GLB,, | Performed by: INTERNAL MEDICINE

## 2024-05-09 PROCEDURE — 36415 COLL VENOUS BLD VENIPUNCTURE: CPT | Mod: PO | Performed by: INTERNAL MEDICINE

## 2024-05-09 PROCEDURE — 3075F SYST BP GE 130 - 139MM HG: CPT | Mod: CPTII,S$GLB,, | Performed by: INTERNAL MEDICINE

## 2024-05-09 PROCEDURE — 1101F PT FALLS ASSESS-DOCD LE1/YR: CPT | Mod: CPTII,S$GLB,, | Performed by: INTERNAL MEDICINE

## 2024-05-09 PROCEDURE — 1126F AMNT PAIN NOTED NONE PRSNT: CPT | Mod: CPTII,S$GLB,, | Performed by: INTERNAL MEDICINE

## 2024-05-09 PROCEDURE — 1157F ADVNC CARE PLAN IN RCRD: CPT | Mod: CPTII,S$GLB,, | Performed by: INTERNAL MEDICINE

## 2024-05-09 PROCEDURE — 3051F HG A1C>EQUAL 7.0%<8.0%: CPT | Mod: CPTII,S$GLB,, | Performed by: INTERNAL MEDICINE

## 2024-05-09 PROCEDURE — 1160F RVW MEDS BY RX/DR IN RCRD: CPT | Mod: CPTII,S$GLB,, | Performed by: INTERNAL MEDICINE

## 2024-05-13 NOTE — ASSESSMENT & PLAN NOTE
Patient's FSGs are controlled on current medication regimen.  Last A1c reviewed-   Lab Results   Component Value Date    HGBA1C 6.7 (H) 06/30/2023     Most recent fingerstick glucose reviewed- No results for input(s): POCTGLUCOSE in the last 24 hours.  Current correctional scale  Low  Maintain anti-hyperglycemic dose as follows-   Antihyperglycemics (From admission, onward)    Start     Stop Route Frequency Ordered    07/08/23 0310  insulin aspart U-100 pen 1-10 Units         -- SubQ Before meals & nightly PRN 07/08/23 0213        Hold Oral hypoglycemics while patient is in the hospital.   No

## 2024-05-15 RX ORDER — CLOPIDOGREL BISULFATE 75 MG/1
75 TABLET ORAL
Qty: 90 TABLET | Refills: 3 | Status: SHIPPED | OUTPATIENT
Start: 2024-05-15

## 2024-05-23 ENCOUNTER — PATIENT MESSAGE (OUTPATIENT)
Dept: PRIMARY CARE CLINIC | Facility: CLINIC | Age: 64
End: 2024-05-23
Payer: MEDICARE

## 2024-06-04 DIAGNOSIS — M19.042 PRIMARY OSTEOARTHRITIS OF BOTH HANDS: ICD-10-CM

## 2024-06-04 DIAGNOSIS — M19.041 PRIMARY OSTEOARTHRITIS OF BOTH HANDS: ICD-10-CM

## 2024-06-04 RX ORDER — HYDROXYCHLOROQUINE SULFATE 200 MG/1
TABLET, FILM COATED ORAL
Qty: 32 TABLET | Refills: 6 | OUTPATIENT
Start: 2024-06-04

## 2024-06-11 DIAGNOSIS — M19.042 PRIMARY OSTEOARTHRITIS OF BOTH HANDS: ICD-10-CM

## 2024-06-11 DIAGNOSIS — M19.041 PRIMARY OSTEOARTHRITIS OF BOTH HANDS: ICD-10-CM

## 2024-06-11 RX ORDER — HYDROXYCHLOROQUINE SULFATE 200 MG/1
TABLET, FILM COATED ORAL
Qty: 60 TABLET | Refills: 3 | Status: SHIPPED | OUTPATIENT
Start: 2024-06-11

## 2024-06-19 DIAGNOSIS — E11.22 TYPE 2 DIABETES MELLITUS WITH STAGE 3A CHRONIC KIDNEY DISEASE, WITH LONG-TERM CURRENT USE OF INSULIN: ICD-10-CM

## 2024-06-19 DIAGNOSIS — N18.31 TYPE 2 DIABETES MELLITUS WITH STAGE 3A CHRONIC KIDNEY DISEASE, WITH LONG-TERM CURRENT USE OF INSULIN: ICD-10-CM

## 2024-06-19 DIAGNOSIS — Z79.4 TYPE 2 DIABETES MELLITUS WITH STAGE 3A CHRONIC KIDNEY DISEASE, WITH LONG-TERM CURRENT USE OF INSULIN: ICD-10-CM

## 2024-06-19 RX ORDER — METFORMIN HYDROCHLORIDE 500 MG/1
TABLET, EXTENDED RELEASE ORAL
Qty: 180 TABLET | Refills: 1 | Status: SHIPPED | OUTPATIENT
Start: 2024-06-19

## 2024-07-09 DIAGNOSIS — E11.22 TYPE 2 DIABETES MELLITUS WITH STAGE 3A CHRONIC KIDNEY DISEASE, WITH LONG-TERM CURRENT USE OF INSULIN: ICD-10-CM

## 2024-07-09 DIAGNOSIS — N18.31 TYPE 2 DIABETES MELLITUS WITH STAGE 3A CHRONIC KIDNEY DISEASE, WITH LONG-TERM CURRENT USE OF INSULIN: ICD-10-CM

## 2024-07-09 DIAGNOSIS — Z79.4 TYPE 2 DIABETES MELLITUS WITH STAGE 3A CHRONIC KIDNEY DISEASE, WITH LONG-TERM CURRENT USE OF INSULIN: ICD-10-CM

## 2024-07-09 RX ORDER — BLOOD-GLUCOSE SENSOR
EACH MISCELLANEOUS
Qty: 9 EACH | Refills: 1 | Status: SHIPPED | OUTPATIENT
Start: 2024-07-09

## 2024-07-16 ENCOUNTER — OFFICE VISIT (OUTPATIENT)
Dept: PRIMARY CARE CLINIC | Facility: CLINIC | Age: 64
End: 2024-07-16
Payer: MEDICARE

## 2024-07-16 VITALS
TEMPERATURE: 98 F | HEIGHT: 66 IN | HEART RATE: 88 BPM | WEIGHT: 198 LBS | SYSTOLIC BLOOD PRESSURE: 130 MMHG | OXYGEN SATURATION: 97 % | BODY MASS INDEX: 31.82 KG/M2 | DIASTOLIC BLOOD PRESSURE: 80 MMHG

## 2024-07-16 DIAGNOSIS — I12.9 HYPERTENSION ASSOCIATED WITH STAGE 3 CHRONIC KIDNEY DISEASE DUE TO TYPE 2 DIABETES MELLITUS: Chronic | ICD-10-CM

## 2024-07-16 DIAGNOSIS — E11.8 CONTROLLED TYPE 2 DIABETES MELLITUS WITH COMPLICATION, WITH LONG-TERM CURRENT USE OF INSULIN: Chronic | ICD-10-CM

## 2024-07-16 DIAGNOSIS — Z79.4 CONTROLLED TYPE 2 DIABETES MELLITUS WITH COMPLICATION, WITH LONG-TERM CURRENT USE OF INSULIN: Chronic | ICD-10-CM

## 2024-07-16 DIAGNOSIS — Z12.11 COLON CANCER SCREENING: ICD-10-CM

## 2024-07-16 DIAGNOSIS — R25.1 TREMOR: Primary | Chronic | ICD-10-CM

## 2024-07-16 DIAGNOSIS — N18.30 HYPERTENSION ASSOCIATED WITH STAGE 3 CHRONIC KIDNEY DISEASE DUE TO TYPE 2 DIABETES MELLITUS: Chronic | ICD-10-CM

## 2024-07-16 DIAGNOSIS — T82.9XXS COMPLICATION OF ARTERIOVENOUS DIALYSIS FISTULA, SEQUELA: ICD-10-CM

## 2024-07-16 DIAGNOSIS — E11.22 HYPERTENSION ASSOCIATED WITH STAGE 3 CHRONIC KIDNEY DISEASE DUE TO TYPE 2 DIABETES MELLITUS: Chronic | ICD-10-CM

## 2024-07-16 PROCEDURE — 99999 PR PBB SHADOW E&M-EST. PATIENT-LVL V: CPT | Mod: PBBFAC,,, | Performed by: FAMILY MEDICINE

## 2024-07-16 PROCEDURE — 3288F FALL RISK ASSESSMENT DOCD: CPT | Mod: CPTII,S$GLB,, | Performed by: FAMILY MEDICINE

## 2024-07-16 PROCEDURE — 1123F ACP DISCUSS/DSCN MKR DOCD: CPT | Mod: CPTII,S$GLB,, | Performed by: FAMILY MEDICINE

## 2024-07-16 PROCEDURE — 1125F AMNT PAIN NOTED PAIN PRSNT: CPT | Mod: CPTII,S$GLB,, | Performed by: FAMILY MEDICINE

## 2024-07-16 PROCEDURE — 1160F RVW MEDS BY RX/DR IN RCRD: CPT | Mod: CPTII,S$GLB,, | Performed by: FAMILY MEDICINE

## 2024-07-16 PROCEDURE — 3008F BODY MASS INDEX DOCD: CPT | Mod: CPTII,S$GLB,, | Performed by: FAMILY MEDICINE

## 2024-07-16 PROCEDURE — 1159F MED LIST DOCD IN RCRD: CPT | Mod: CPTII,S$GLB,, | Performed by: FAMILY MEDICINE

## 2024-07-16 PROCEDURE — 3075F SYST BP GE 130 - 139MM HG: CPT | Mod: CPTII,S$GLB,, | Performed by: FAMILY MEDICINE

## 2024-07-16 PROCEDURE — 3079F DIAST BP 80-89 MM HG: CPT | Mod: CPTII,S$GLB,, | Performed by: FAMILY MEDICINE

## 2024-07-16 PROCEDURE — 4010F ACE/ARB THERAPY RXD/TAKEN: CPT | Mod: CPTII,S$GLB,, | Performed by: FAMILY MEDICINE

## 2024-07-16 PROCEDURE — 99215 OFFICE O/P EST HI 40 MIN: CPT | Mod: S$GLB,,, | Performed by: FAMILY MEDICINE

## 2024-07-16 PROCEDURE — 3051F HG A1C>EQUAL 7.0%<8.0%: CPT | Mod: CPTII,S$GLB,, | Performed by: FAMILY MEDICINE

## 2024-07-16 PROCEDURE — 3066F NEPHROPATHY DOC TX: CPT | Mod: CPTII,S$GLB,, | Performed by: FAMILY MEDICINE

## 2024-07-16 PROCEDURE — 1101F PT FALLS ASSESS-DOCD LE1/YR: CPT | Mod: CPTII,S$GLB,, | Performed by: FAMILY MEDICINE

## 2024-07-16 NOTE — ASSESSMENT & PLAN NOTE
Patient reports that the left arm and sometimes leg tremor seems more prominent.  She did consult with Neurology about a year and a half ago and felt it was multifactorial.  On exam the tremor has Parkinson's characteristics, mostly prominent resting.  Goes away at least temporarily with action.  It has a higher amplitude with slower frequency compared to an essential tremor.  But she does not have other characteristics of Parkinson's disease.  No shuffling gait, no bradykinesia.  We did discuss going back to Neurology, there are medications that might help although these often have some side effects.  For now she will continue to monitor.

## 2024-07-16 NOTE — ASSESSMENT & PLAN NOTE
She has concerned that the fistula appears more prominent.  She did consult with vascular surgery in October of 2023:  IMAGING:  Duplex of the fistula shows a moderate outflow stenosis with a velocity of 480.  Flow volume is robust at 2.2 L    IMPRESSION: modestly aneurysmal left AV fistula.  I have reassured the patient this poses no significant risk to her and has no indication for aneurysmorrhaphy or ligation    On exam she has a visible and palpable AV fistula left arm with what appears to have aneurysmal formation both proximally and distally but there does not appear to be any emergent or dramatic change.  I would recommend continuing to review with her nephrologist at regular follow-ups and potentially following back with vascular surgery annually or sooner if there is any further change.

## 2024-07-16 NOTE — PROGRESS NOTES
Primary Care Provider Appointment   [facility]       Patient ID: Andra Newell is a 64 y.o. female.    ASSESSMENT/PLAN by Problem List:    1. Tremor  Assessment & Plan:  Patient reports that the left arm and sometimes leg tremor seems more prominent.  She did consult with Neurology about a year and a half ago and felt it was multifactorial.  On exam the tremor has Parkinson's characteristics, mostly prominent resting.  Goes away at least temporarily with action.  It has a higher amplitude with slower frequency compared to an essential tremor.  But she does not have other characteristics of Parkinson's disease.  No shuffling gait, no bradykinesia.  We did discuss going back to Neurology, there are medications that might help although these often have some side effects.  For now she will continue to monitor.      2. Colon cancer screening  -     Case Request Endoscopy: COLONOSCOPY    3. Controlled type 2 diabetes mellitus with complication, with long-term current use of insulin  Assessment & Plan:  Diabetes remains satisfactory, last A1c 7.3%.  She continues to follow with Endocrinology.      4. Essential hypertension   Assessment & Plan:  Stable and satisfactory continue current medications      5. Complication of arteriovenous dialysis fistula, sequela  Assessment & Plan:  She has concerned that the fistula appears more prominent.  She did consult with vascular surgery in October of 2023:  IMAGING:  Duplex of the fistula shows a moderate outflow stenosis with a velocity of 480.  Flow volume is robust at 2.2 L    IMPRESSION: modestly aneurysmal left AV fistula.  I have reassured the patient this poses no significant risk to her and has no indication for aneurysmorrhaphy or ligation    On exam she has a visible and palpable AV fistula left arm with what appears to have aneurysmal formation both proximally and distally but there does not appear to be any emergent or  dramatic change.  I would recommend continuing to review with her nephrologist at regular follow-ups and potentially following back with vascular surgery annually or sooner if there is any further change.           Follow Up:  Three months    Forty-five minutes of total time spent on the encounter, time includes face to face time, and some or all of the following: review of chart, lab, imaging, consultant notes, ER, hospital, documentation, care coordination, etc.    Health Maintenance         Date Due Completion Date    Shingles Vaccine (1 of 2) Never done ---    RSV Vaccine (Age 60+ and Pregnant patients) (1 - 1-dose 60+ series) Never done ---    COVID-19 Vaccine (3 - Pfizer risk series) 08/11/2021 7/14/2021    Pneumococcal Vaccines (Age 65+) (3 of 3 - PPSV23 or PCV20) 04/30/2024 4/30/2019    Colorectal Cancer Screening 05/09/2024 5/9/2019    Eye Exam 05/16/2024 5/16/2023    Influenza Vaccine (1) 09/01/2024 10/12/2023    Override on 10/6/2020: Done (completed at [facility])    Hemoglobin A1c 11/01/2024 5/1/2024    Mammogram 11/11/2024 11/11/2023    Foot Exam 05/08/2025 5/8/2024    Override on 10/2/2019: Done    Lipid Panel 05/09/2025 5/9/2024    Aspirin/Antiplatelet Therapy 05/15/2025 5/15/2024    DEXA Scan 09/12/2026 9/12/2023    TETANUS VACCINE 07/12/2027 7/12/2017        Had recent eye exam, will request results    Advance Care Planning     Date: 07/16/2024    Power of   I initiated the process of voluntary advance care planning today and explained the importance of this process to the patient.  I introduced the concept of advance directives to the patient, as well. Then the patient received detailed information about the importance of designating a Health Care Power of  (HCPOA). She was also instructed to communicate with this person about their wishes for future healthcare, should she become sick and lose decision-making capacity. The patient has previously appointed a HCPOA. After our  discussion, the patient has decided to complete a HCPOA and has appointed her significant other, health care agent:  Any Kim    & health care agent number:  see chart . I encouraged her to communicate with this person about their wishes for future healthcare, should she become sick and lose decision-making capacity.    I reviewed documents on file that are scanned into the media section, living will and HPA, both found when searching living will.  There have been no changes.             Subjective:     Chief Complaint   Patient presents with    Dizziness     Pt states she has been dizzy for about a month  Happens when she awakes from sleeping mostly in the morning     I have reviewed the information entered by the ancillary staff regarding the chief complaint as well as the related history.    HPI    Patient is a/an 64 y.o.  female     Tremors appear mildly worse, primarily left side, mostly arm sometimes legs.    Did see neuro, they felt multifactorial      Diabetes looks good.  Blood pressure has been stable.      She does feel that the AV fistula on the left arm appears more prominent.  She did discuss this with vascular surgery last October.        For complete problem list, past medical history, surgical history, social history, etc., see appropriate section in the electronic medical record    Review of Systems   HENT: Negative.     Respiratory: Negative.     Cardiovascular: Negative.    Gastrointestinal: Negative.    Genitourinary: Negative.    Musculoskeletal:  Positive for arthralgias and back pain.   Neurological:  Positive for tremors.       Objective     Physical Exam  HENT:      Head: Normocephalic and atraumatic.   Eyes:      General: No scleral icterus.     Conjunctiva/sclera: Conjunctivae normal.   Cardiovascular:      Rate and Rhythm: Normal rate and regular rhythm.      Heart sounds: Murmur heard.      Diastolic murmur is present with a grade of 2/4.      No friction rub.      Comments:  "There is no peripheral edema.  Pulmonary:      Effort: Pulmonary effort is normal. No respiratory distress.      Breath sounds: Normal breath sounds. No wheezing or rales.   Neurological:      Comments: Patient does have a subtle bilateral hand tremor that appears relatively rapid frequency and low amplitude.  However patient also has intermittent tremor of the left arm which appears mostly at rest and has a higher amplitude and lower frequency.  This appears only present on the left arm at this time.    Patient has a mildly antalgic gait and some stiffness from her back but there is no shuffling.    There is no bradykinesia, no cogwheeling   Psychiatric:         Mood and Affect: Mood normal.       Vitals:    07/16/24 1058   BP: 130/80   Pulse: 88   Temp: 98.4 °F (36.9 °C)   TempSrc: Oral   SpO2: 97%   Weight: 89.8 kg (197 lb 15.6 oz)   Height: 5' 6" (1.676 m)           THIS DOCUMENT WAS MADE IN PART WITH VOICE RECOGNITION SOFTWARE.  OCCASIONALLY THIS SOFTWARE WILL MISINTERPRET WORDS OR PHRASES.  "

## 2024-07-17 ENCOUNTER — TELEPHONE (OUTPATIENT)
Dept: GASTROENTEROLOGY | Facility: CLINIC | Age: 64
End: 2024-07-17
Payer: MEDICARE

## 2024-07-17 ENCOUNTER — PATIENT OUTREACH (OUTPATIENT)
Dept: PRIMARY CARE CLINIC | Facility: CLINIC | Age: 64
End: 2024-07-17
Payer: MEDICARE

## 2024-07-17 DIAGNOSIS — I25.10 CORONARY ARTERY DISEASE INVOLVING NATIVE CORONARY ARTERY OF NATIVE HEART WITHOUT ANGINA PECTORIS: Primary | ICD-10-CM

## 2024-07-23 ENCOUNTER — E-CONSULT (OUTPATIENT)
Dept: CARDIOLOGY | Facility: CLINIC | Age: 64
End: 2024-07-23
Payer: MEDICARE

## 2024-07-23 DIAGNOSIS — Z01.810 PRE-OPERATIVE CARDIOVASCULAR EXAMINATION: Primary | ICD-10-CM

## 2024-07-23 PROCEDURE — 99451 NTRPROF PH1/NTRNET/EHR 5/>: CPT | Mod: S$GLB,,, | Performed by: INTERNAL MEDICINE

## 2024-07-23 NOTE — CONSULTS
[facility] - Cardiology  Response for E-Consult     Patient Name: Andra Newell  MRN: [id number]  Primary Care Provider: Fady Vaughn MD   Requesting Provider: Oumar Gaines MD  E-Consult to Cardiology  Consult performed by: Bryant Werner MD  Consult ordered by: Oumar Gaines MD        Chart reviewed personally.      No evidence seen in Epic of percutaneous coronary or peripheral arterial intervention within the last year.    As long as no significant chest pain or shortness of breath with exertion, patient is at acceptable risk to proceed from a Cardiology standpoint.    Okay to hold Plavix 5 days prior to procedure, restart as soon as safe postprocedure.      Total time of Consultation: 5 minute    I did not speak to the requesting provider verbally about this.     *This eConsult is based on the clinical data available to me and is furnished without benefit of a physical examination. The eConsult will need to be interpreted in light of any clinical issues or changes in patient status not available to me at the time of filing this eConsults. Significant changes in patient condition or level of acuity should result in immediate formal consultation and reevaluation. Please alert me if you have further questions.    Thank you for this eConsult referral.     Bryant Werner MD  [facility] Cardiology

## 2024-08-21 DIAGNOSIS — E11.22 HYPERTENSION ASSOCIATED WITH STAGE 3 CHRONIC KIDNEY DISEASE DUE TO TYPE 2 DIABETES MELLITUS: ICD-10-CM

## 2024-08-21 DIAGNOSIS — E79.0 HYPERURICEMIA: ICD-10-CM

## 2024-08-21 DIAGNOSIS — N18.30 HYPERTENSION ASSOCIATED WITH STAGE 3 CHRONIC KIDNEY DISEASE DUE TO TYPE 2 DIABETES MELLITUS: ICD-10-CM

## 2024-08-21 DIAGNOSIS — I12.9 HYPERTENSION ASSOCIATED WITH STAGE 3 CHRONIC KIDNEY DISEASE DUE TO TYPE 2 DIABETES MELLITUS: ICD-10-CM

## 2024-08-21 RX ORDER — ALLOPURINOL 100 MG/1
100 TABLET ORAL
Qty: 90 TABLET | Refills: 2 | Status: SHIPPED | OUTPATIENT
Start: 2024-08-21

## 2024-08-21 RX ORDER — LOSARTAN POTASSIUM 50 MG/1
50 TABLET ORAL
Qty: 90 TABLET | Refills: 2 | Status: SHIPPED | OUTPATIENT
Start: 2024-08-21

## 2024-08-24 NOTE — TELEPHONE ENCOUNTER
----- Message from Missy Noriega sent at 6/18/2020  2:56 PM CDT -----  Contact: Patient  Type: Needs Medical Advice  Who Called:  Patient  Best Call Back Number:   Additional Information: Calling to schedule appointment       normal...

## 2024-08-28 ENCOUNTER — LAB VISIT (OUTPATIENT)
Dept: LAB | Facility: HOSPITAL | Age: 64
End: 2024-08-28
Attending: NURSE PRACTITIONER
Payer: MEDICARE

## 2024-08-28 DIAGNOSIS — N18.31 TYPE 2 DIABETES MELLITUS WITH STAGE 3A CHRONIC KIDNEY DISEASE, WITH LONG-TERM CURRENT USE OF INSULIN: ICD-10-CM

## 2024-08-28 DIAGNOSIS — Z79.4 TYPE 2 DIABETES MELLITUS WITH STAGE 3A CHRONIC KIDNEY DISEASE, WITH LONG-TERM CURRENT USE OF INSULIN: ICD-10-CM

## 2024-08-28 DIAGNOSIS — E11.22 TYPE 2 DIABETES MELLITUS WITH STAGE 3A CHRONIC KIDNEY DISEASE, WITH LONG-TERM CURRENT USE OF INSULIN: ICD-10-CM

## 2024-08-28 LAB
ESTIMATED AVG GLUCOSE: 197 MG/DL (ref 68–131)
HBA1C MFR BLD: 8.5 % (ref 4–5.6)

## 2024-08-28 PROCEDURE — 83036 HEMOGLOBIN GLYCOSYLATED A1C: CPT | Performed by: NURSE PRACTITIONER

## 2024-08-28 PROCEDURE — 36415 COLL VENOUS BLD VENIPUNCTURE: CPT | Mod: PO | Performed by: NURSE PRACTITIONER

## 2024-09-04 ENCOUNTER — OFFICE VISIT (OUTPATIENT)
Dept: ENDOCRINOLOGY | Facility: CLINIC | Age: 64
End: 2024-09-04
Payer: MEDICARE

## 2024-09-04 VITALS
BODY MASS INDEX: 31.78 KG/M2 | HEART RATE: 88 BPM | RESPIRATION RATE: 16 BRPM | HEIGHT: 66 IN | WEIGHT: 197.75 LBS | DIASTOLIC BLOOD PRESSURE: 78 MMHG | SYSTOLIC BLOOD PRESSURE: 138 MMHG

## 2024-09-04 DIAGNOSIS — I25.10 CORONARY ARTERY DISEASE INVOLVING NATIVE CORONARY ARTERY OF NATIVE HEART WITHOUT ANGINA PECTORIS: ICD-10-CM

## 2024-09-04 DIAGNOSIS — N18.31 TYPE 2 DIABETES MELLITUS WITH STAGE 3A CHRONIC KIDNEY DISEASE, WITH LONG-TERM CURRENT USE OF INSULIN: ICD-10-CM

## 2024-09-04 DIAGNOSIS — E11.22 HYPERTENSION ASSOCIATED WITH STAGE 3 CHRONIC KIDNEY DISEASE DUE TO TYPE 2 DIABETES MELLITUS: Chronic | ICD-10-CM

## 2024-09-04 DIAGNOSIS — E11.22 TYPE 2 DIABETES MELLITUS WITH STAGE 3A CHRONIC KIDNEY DISEASE, WITH LONG-TERM CURRENT USE OF INSULIN: ICD-10-CM

## 2024-09-04 DIAGNOSIS — E78.2 MIXED HYPERLIPIDEMIA: ICD-10-CM

## 2024-09-04 DIAGNOSIS — N18.31 TYPE 2 DIABETES MELLITUS WITH STAGE 3A CHRONIC KIDNEY DISEASE, WITH LONG-TERM CURRENT USE OF INSULIN: Primary | ICD-10-CM

## 2024-09-04 DIAGNOSIS — Z79.4 TYPE 2 DIABETES MELLITUS WITH STAGE 3A CHRONIC KIDNEY DISEASE, WITH LONG-TERM CURRENT USE OF INSULIN: ICD-10-CM

## 2024-09-04 DIAGNOSIS — E66.9 OBESITY (BMI 30-39.9): Chronic | ICD-10-CM

## 2024-09-04 DIAGNOSIS — N18.30 HYPERTENSION ASSOCIATED WITH STAGE 3 CHRONIC KIDNEY DISEASE DUE TO TYPE 2 DIABETES MELLITUS: Chronic | ICD-10-CM

## 2024-09-04 DIAGNOSIS — E11.22 TYPE 2 DIABETES MELLITUS WITH STAGE 3A CHRONIC KIDNEY DISEASE, WITH LONG-TERM CURRENT USE OF INSULIN: Primary | ICD-10-CM

## 2024-09-04 DIAGNOSIS — E03.9 ACQUIRED HYPOTHYROIDISM: Chronic | ICD-10-CM

## 2024-09-04 DIAGNOSIS — Z79.4 TYPE 2 DIABETES MELLITUS WITH STAGE 3A CHRONIC KIDNEY DISEASE, WITH LONG-TERM CURRENT USE OF INSULIN: Primary | ICD-10-CM

## 2024-09-04 DIAGNOSIS — Z94.0 KIDNEY TRANSPLANT RECIPIENT: Chronic | ICD-10-CM

## 2024-09-04 DIAGNOSIS — I12.9 HYPERTENSION ASSOCIATED WITH STAGE 3 CHRONIC KIDNEY DISEASE DUE TO TYPE 2 DIABETES MELLITUS: Chronic | ICD-10-CM

## 2024-09-04 RX ORDER — INSULIN DEGLUDEC 200 U/ML
INJECTION, SOLUTION SUBCUTANEOUS
Qty: 2 PEN | Refills: 6 | Status: SHIPPED | OUTPATIENT
Start: 2024-09-04

## 2024-09-04 RX ORDER — METFORMIN HYDROCHLORIDE 500 MG/1
500 TABLET, EXTENDED RELEASE ORAL 2 TIMES DAILY WITH MEALS
Qty: 180 TABLET | Refills: 3 | Status: SHIPPED | OUTPATIENT
Start: 2024-09-04

## 2024-09-04 RX ORDER — PEN NEEDLE, DIABETIC 30 GX3/16"
NEEDLE, DISPOSABLE MISCELLANEOUS
Qty: 400 EACH | Refills: 3 | Status: SHIPPED | OUTPATIENT
Start: 2024-09-04

## 2024-09-04 RX ORDER — INSULIN ASPART 100 [IU]/ML
INJECTION, SOLUTION INTRAVENOUS; SUBCUTANEOUS
Qty: 30 ML | Refills: 4 | Status: SHIPPED | OUTPATIENT
Start: 2024-09-04

## 2024-09-04 NOTE — PROGRESS NOTES
CC: This 64 y.o. female  presents for management of diabetes  and chronic conditions pending review including HTN, HLP, ESRD s/p kidney txp 01/14/2019, hypothyroidism, vitamin d deficiency, obesity      HPI: She  was diagnosed with T2DM in 2005. Has never been hospitalized r/t DM.  Family hx of DM: grandmother    No acute events since her last visit    Having lots of back pain 10/10  Having an MRI soon  Wearing Dexcom G7 sensor- see download in Media tab;  26% Very High  52% High  22% In Range  0% Low  0% Very Low  GMI 8.6%   BG mostly globally elevated, falls to upper normal range overnight occasionally  Prandial excursions noted     Diet: Eats 2-3 Meals a day, snacks-  cheese and crackers, eats something sweet after dinner  B- [place] raisin cream cake   L- TV dinner or cheese and crackers   D- home cooked- hamburger helper   She is trying to add in more veg and limit fried foods  Exercise: none but will be meeting with the  soon   CURRENT DM MEDS: Tresiba 18 u qam, Novolog 12 u Ac + correction metformin xr 500 mg bid  Timing prandial insulin 5-15 minutes before meals: yes  Vial/pen:  Uses pens    Standards of Care:  Eye exam: Dr. Ortega 5/7/2024- will need cataract sx on both bilaterally     Regarding hypothyroidism   Fmh mom  Takes LT4 with all her other meds   No hoarseness, voice changes, +dysphagia- following w GI, no compressive symptoms, or head/neck exposure to XRT.   No personal or FH of thyroid cancer or MEN syndrome.   Not taking biotin.   + tremors of the hands  + intolerance to the heat    + hair loss       ROS: Gen: Appetite good, weight gain 7 lbs  Eyes: Denies visual disturbances  Resp: no SOB  Cardiac:  palpitations- rarely notices when she's anxious ,  No chest pain-   GI: + nausea- off and on, vomiting,  + diarrhea     /GYN: 3+ nocturia, no burning or pain.   MS/Neuro: , speech clear    PE:  GENERAL: Well developed, well nourished.  PSYCH: AAOx3, appropriate mood and affect,  "pleasant expression, conversant, appears relaxed, well groomed.   EYES: Conjunctiva, corneas clear  NECK: Supple, trachea midline,   SKIN:   no acanthosis nigracans.  FOOT EXAMINATION:  5/8/2024  No foot deformity, corns or callus formation,  nails in good condition and well trimmed, no interspace maceration or ulceration noted.  Decreased hair growth present over toes/feet.  Protective sensation intact with 10 gram monofilament.  +2 dorsalis pedis and posterior pulses noted.     Personally reviewed Past Medical, Surgical, Social History.    /78 (BP Location: Right arm)   Pulse 88   Resp 16   Ht 5' 6" (1.676 m)   Wt 89.7 kg (197 lb 12 oz)   LMP 02/28/2012   BMI 31.92 kg/m²      Personally reviewed the below labs:      Chemistry        Component Value Date/Time     02/21/2024 1013    K 4.9 02/21/2024 1013     02/21/2024 1013    CO2 28 02/21/2024 1013    BUN 22 02/21/2024 1013    CREATININE 1.1 02/21/2024 1013     (H) 02/21/2024 1013        Component Value Date/Time    CALCIUM 10.0 02/21/2024 1013    ALKPHOS 171 (H) 01/16/2024 1132    AST 39 01/16/2024 1132    ALT 45 (H) 01/16/2024 1132    BILITOT 0.5 01/16/2024 1132    ESTGFRAFRICA >60.0 06/09/2022 0954    EGFRNONAA 59.7 (A) 06/09/2022 0954            Lab Results   Component Value Date    TSH 0.499 05/01/2024       Recent Labs   Lab 05/09/24  1125   LDL Cholesterol 58.8 L   HDL 36 L   Cholesterol 129        Results for orders placed or performed in visit on 11/17/21   Vitamin D   Result Value Ref Range    Vit D, 25-Hydroxy 42 30 - 96 ng/mL     No results found. However, due to the size of the patient record, not all encounters were searched. Please check Results Review for a complete set of results.    Lab Results   Component Value Date    MICALBCREAT 8.1 01/04/2023       Hemoglobin A1C   Date Value Ref Range Status   08/28/2024 8.5 (H) 4.0 - 5.6 % Final     Comment:     ADA Screening Guidelines:  5.7-6.4%  Consistent with " prediabetes  >or=6.5%  Consistent with diabetes    High levels of fetal hemoglobin interfere with the HbA1C  assay. Heterozygous hemoglobin variants (HbS, HgC, etc)do  not significantly interfere with this assay.   However, presence of multiple variants may affect accuracy.     05/01/2024 7.3 (H) 4.0 - 5.6 % Final     Comment:     ADA Screening Guidelines:  5.7-6.4%  Consistent with prediabetes  >or=6.5%  Consistent with diabetes    High levels of fetal hemoglobin interfere with the HbA1C  assay. Heterozygous hemoglobin variants (HbS, HgC, etc)do  not significantly interfere with this assay.   However, presence of multiple variants may affect accuracy.     11/07/2023 7.6 (H) 4.0 - 5.6 % Final     Comment:     ADA Screening Guidelines:  5.7-6.4%  Consistent with prediabetes  >or=6.5%  Consistent with diabetes    High levels of fetal hemoglobin interfere with the HbA1C  assay. Heterozygous hemoglobin variants (HbS, HgC, etc)do  not significantly interfere with this assay.   However, presence of multiple variants may affect accuracy.          ASSESSMENT and PLAN:      1. 1. T2DM with hyperglycemia, CKD 3-    Her back pain is likely driving bg up   Increase Tresiba U200 to 24 u qd  Continue  metformin xr 500 mg bid, , Continue Novolog 12 u AC + correction 150 /25,   ONLY take Novolog 5-15 mins prior to a meal, if skipping a meal, skip Novolog  Continue Dexcom G7- message me in 1 week to review  Hold GLP-1 until she sees gastro as she's having so much nausea w episodes of vomiting  SGLT2 will need to be approved by [facility]/nephrology       2. HTN - controlled, continue meds as previously prescribed and monitor.      3. HLP - on statin therapy     4. S/p kidney txp- followed by [facility]     5.  Immunosuppression - agents may increase bg readings     6. Hypothyroidism - takes with all other meds, clinically euthyroid, Last TSH WNL      F/u in 3 months w A1C

## 2024-09-09 RX ORDER — METFORMIN HYDROCHLORIDE 500 MG/1
TABLET, EXTENDED RELEASE ORAL
Qty: 180 TABLET | Refills: 1 | Status: SHIPPED | OUTPATIENT
Start: 2024-09-09

## 2024-09-11 ENCOUNTER — OFFICE VISIT (OUTPATIENT)
Dept: URGENT CARE | Facility: CLINIC | Age: 64
End: 2024-09-11
Payer: MEDICARE

## 2024-09-11 VITALS
HEIGHT: 66 IN | RESPIRATION RATE: 16 BRPM | WEIGHT: 197 LBS | OXYGEN SATURATION: 98 % | SYSTOLIC BLOOD PRESSURE: 146 MMHG | DIASTOLIC BLOOD PRESSURE: 81 MMHG | TEMPERATURE: 99 F | BODY MASS INDEX: 31.66 KG/M2 | HEART RATE: 85 BPM

## 2024-09-11 DIAGNOSIS — K52.9 GASTROENTERITIS: Primary | ICD-10-CM

## 2024-09-11 PROCEDURE — 99214 OFFICE O/P EST MOD 30 MIN: CPT | Mod: S$GLB,,, | Performed by: INTERNAL MEDICINE

## 2024-09-11 RX ORDER — ONDANSETRON 8 MG/1
8 TABLET, ORALLY DISINTEGRATING ORAL
Status: COMPLETED | OUTPATIENT
Start: 2024-09-11 | End: 2024-09-11

## 2024-09-11 RX ORDER — ONDANSETRON 4 MG/1
4 TABLET, FILM COATED ORAL EVERY 6 HOURS PRN
Qty: 20 TABLET | Refills: 0 | Status: SHIPPED | OUTPATIENT
Start: 2024-09-11 | End: 2024-09-16

## 2024-09-11 RX ORDER — ONDANSETRON 4 MG/1
8 TABLET, ORALLY DISINTEGRATING ORAL
Status: DISCONTINUED | OUTPATIENT
Start: 2024-09-11 | End: 2024-09-11

## 2024-09-11 RX ADMIN — ONDANSETRON 8 MG: 8 TABLET, ORALLY DISINTEGRATING ORAL at 09:09

## 2024-09-11 NOTE — PROGRESS NOTES
"Subjective:      Patient ID: Andra Newell is a 64 y.o. female.    Vitals:  height is 5' 6" (1.676 m) and weight is 89.4 kg (197 lb). Her oral temperature is 99.4 °F (37.4 °C). Her blood pressure is 146/81 (abnormal) and her pulse is 85. Her respiration is 16 and oxygen saturation is 98%.     Chief Complaint: Nausea    NVD since 3 am today   Patient did use one RX nausea medication with nor relief.     Nausea  This is a new problem. The current episode started today. The problem occurs constantly. The problem has been gradually worsening. Associated symptoms include a change in bowel habit, nausea and vomiting. Treatments tried: RX med.       Gastrointestinal:  Positive for nausea and vomiting.      Objective:     Physical Exam   Constitutional: She is oriented to person, place, and time. She appears well-developed. She is cooperative.  Non-toxic appearance. She does not appear ill. No distress.   HENT:   Head: Normocephalic and atraumatic.   Ears:   Right Ear: Hearing, tympanic membrane, external ear and ear canal normal.   Left Ear: Hearing, tympanic membrane, external ear and ear canal normal.   Nose: Nose normal. No mucosal edema, rhinorrhea or nasal deformity. No epistaxis. Right sinus exhibits no maxillary sinus tenderness and no frontal sinus tenderness. Left sinus exhibits no maxillary sinus tenderness and no frontal sinus tenderness.   Mouth/Throat: Uvula is midline and oropharynx is clear and moist. Mucous membranes are dry. No trismus in the jaw. Normal dentition. No uvula swelling. No oropharyngeal exudate, posterior oropharyngeal edema or posterior oropharyngeal erythema.   Eyes: Conjunctivae and lids are normal. No scleral icterus.   Neck: Trachea normal and phonation normal. Neck supple. No edema present. No erythema present. No neck rigidity present.   Cardiovascular: Regular rhythm, normal heart sounds and normal pulses. Tachycardia present.   Pulmonary/Chest: Effort normal and breath sounds " normal. No respiratory distress. She has no decreased breath sounds. She has no rhonchi.   Abdominal: Normal appearance.   Musculoskeletal: Normal range of motion.         General: No deformity. Normal range of motion.   Neurological: She is alert and oriented to person, place, and time. She exhibits normal muscle tone. Coordination normal.   Skin: Skin is warm, dry, intact, not diaphoretic and not pale.   Psychiatric: Her speech is normal and behavior is normal. Judgment and thought content normal.   Nursing note and vitals reviewed.      Assessment:     1. Gastroenteritis        Plan:       Gastroenteritis  -     ondansetron disintegrating tablet 8 mg  -     ondansetron (ZOFRAN) 4 MG tablet; Take 1 tablet (4 mg total) by mouth every 6 (six) hours as needed for Nausea.  Dispense: 20 tablet; Refill: 0      Patient Instructions   If your condition worsens we recommend that you receive another evaluation at the emergency room immediately or contact your primary medical clinics after hours call service to discuss your concerns. You must understand that you've received an Urgent Care treatment only and that you may be released before all of your medical problems are known or treated. You, the patient, will arrange for follow up care as instructed.  Drink plenty of Fluids  Wash hands frequently using mild antibacterial soap lathering for at least 15 seconds then rinse  Get plenty of Rest  Follow up in 1-2 weeks with Primary Care physician if not significantly better.   If you are not allergic please take Tylenol every 4-6 hours as needed and/or Ibuprofen every 6-8 hours as needed, over the counter for pain or fever.

## 2024-09-16 NOTE — PROGRESS NOTES
Patient arrived to the unit in stable condition from [facility] the [place]. Patient's SO at the bedside. Shante Green NP notified of the patient's arrival. Patient and SO oriented to the room and the unit. Patient denies any pain or dizziness at this time. Reminded the patient to call for assistance. Call light and personal items are within reach.    Left arm;

## 2024-09-19 NOTE — TELEPHONE ENCOUNTER
"    Spoke with patient at length regarding her upcoming living kidney transplant date. Reiterated to Andra that her date is presently scheduled for 1/14/19. Reinforced that an earlier date may be possible, but could not be given at this time. Also explained to her that she cannot set a surgery date, and educated her on the donor process, and that the surgery dates are discussed with the donor first, based on availabilty . She became very upset , yelling that "if it was not for the incompetence of the doctor in the transplant clinic, I would have gotten my kidney October 15th, but because of that lady doctor, I have to wait. I do not ever want to see her again!" Patient then began crying, stating that "she will not go back on dialysis, no matter what". Coordinator sympathized with the fact that she had to postpone her transplant, but also that she would have to accept the current date of January 14th 2019 if a sooner date was not possible. Andra then stated tearfully that "it is what it is, maybe I wont even need a kidney."  Then abruptly hung up the phone.     Will have transplant social work assess her mental status and proceed per protocol.       ----- Message from Maureen Valle sent at 10/23/2018  1:59 PM CDT -----  Contact: patient  Needs Advice    Reason for call: pt would like to speak with someone concerning txp date         Communication Preference: [phone]    Additional Information: n/a      "
19-Sep-2024 18:05

## 2024-09-26 DIAGNOSIS — M19.042 PRIMARY OSTEOARTHRITIS OF BOTH HANDS: ICD-10-CM

## 2024-09-26 DIAGNOSIS — M19.041 PRIMARY OSTEOARTHRITIS OF BOTH HANDS: ICD-10-CM

## 2024-09-26 RX ORDER — HYDROXYCHLOROQUINE SULFATE 200 MG/1
200 TABLET, FILM COATED ORAL 2 TIMES DAILY
Qty: 60 TABLET | Refills: 3 | Status: SHIPPED | OUTPATIENT
Start: 2024-09-26

## 2024-10-02 ENCOUNTER — TELEPHONE (OUTPATIENT)
Dept: PRIMARY CARE CLINIC | Facility: CLINIC | Age: 64
End: 2024-10-02
Payer: MEDICARE

## 2024-10-02 NOTE — TELEPHONE ENCOUNTER
Patient called about her 7/14/23 visit being coded incorrectly, as she should not have a copay and billing told her to call us about it.    #[phone]

## 2024-10-02 NOTE — TELEPHONE ENCOUNTER
I received a message that the patient is concerned an office visit from July 14, 2023 was coded incorrectly.  This was coded as a routine office visit with applicable diagnosis codes.  I have no idea what this is referring to or who told her something was coded incorrectly.  If her insurance does not require a co-pay with the visit and [facility] then [facility] should refined that to her.  There is nothing I can see incorrect about a normal office visit code that was use

## 2024-10-16 ENCOUNTER — TELEPHONE (OUTPATIENT)
Dept: GASTROENTEROLOGY | Facility: CLINIC | Age: 64
End: 2024-10-16
Payer: MEDICARE

## 2024-10-16 NOTE — TELEPHONE ENCOUNTER
----- Message from Dottie sent at 10/16/2024 11:47 AM CDT -----  Contact: pt [phone]  Type: Needs Medical Advice  Who Called:  Pt     Best Call Back Number: [phone]    Additional Information: Pt calling to [facility]xl Col. She stated she will call back at a later date to reschedule. Thank you

## 2024-11-14 ENCOUNTER — OFFICE VISIT (OUTPATIENT)
Dept: PRIMARY CARE CLINIC | Facility: CLINIC | Age: 64
End: 2024-11-14
Payer: MEDICARE

## 2024-11-14 VITALS — OXYGEN SATURATION: 98 % | SYSTOLIC BLOOD PRESSURE: 126 MMHG | HEART RATE: 96 BPM | DIASTOLIC BLOOD PRESSURE: 80 MMHG

## 2024-11-14 DIAGNOSIS — Z12.31 SCREENING MAMMOGRAM FOR BREAST CANCER: ICD-10-CM

## 2024-11-14 DIAGNOSIS — Z79.4 TYPE 2 DIABETES MELLITUS WITH STAGE 3A CHRONIC KIDNEY DISEASE, WITH LONG-TERM CURRENT USE OF INSULIN: Primary | ICD-10-CM

## 2024-11-14 DIAGNOSIS — E11.22 TYPE 2 DIABETES MELLITUS WITH STAGE 3A CHRONIC KIDNEY DISEASE, WITH LONG-TERM CURRENT USE OF INSULIN: Primary | ICD-10-CM

## 2024-11-14 DIAGNOSIS — N18.31 TYPE 2 DIABETES MELLITUS WITH STAGE 3A CHRONIC KIDNEY DISEASE, WITH LONG-TERM CURRENT USE OF INSULIN: Primary | ICD-10-CM

## 2024-11-14 PROCEDURE — 3074F SYST BP LT 130 MM HG: CPT | Mod: CPTII,S$GLB,, | Performed by: PHYSICIAN ASSISTANT

## 2024-11-14 PROCEDURE — 99215 OFFICE O/P EST HI 40 MIN: CPT | Mod: S$GLB,,, | Performed by: PHYSICIAN ASSISTANT

## 2024-11-14 PROCEDURE — 1125F AMNT PAIN NOTED PAIN PRSNT: CPT | Mod: CPTII,S$GLB,, | Performed by: PHYSICIAN ASSISTANT

## 2024-11-14 PROCEDURE — 1101F PT FALLS ASSESS-DOCD LE1/YR: CPT | Mod: CPTII,S$GLB,, | Performed by: PHYSICIAN ASSISTANT

## 2024-11-14 PROCEDURE — 3052F HG A1C>EQUAL 8.0%<EQUAL 9.0%: CPT | Mod: CPTII,S$GLB,, | Performed by: PHYSICIAN ASSISTANT

## 2024-11-14 PROCEDURE — 1123F ACP DISCUSS/DSCN MKR DOCD: CPT | Mod: CPTII,S$GLB,, | Performed by: PHYSICIAN ASSISTANT

## 2024-11-14 PROCEDURE — 99417 PROLNG OP E/M EACH 15 MIN: CPT | Mod: S$GLB,,, | Performed by: PHYSICIAN ASSISTANT

## 2024-11-14 PROCEDURE — 3079F DIAST BP 80-89 MM HG: CPT | Mod: CPTII,S$GLB,, | Performed by: PHYSICIAN ASSISTANT

## 2024-11-14 PROCEDURE — 2023F DILAT RTA XM W/O RTNOPTHY: CPT | Mod: CPTII,S$GLB,, | Performed by: PHYSICIAN ASSISTANT

## 2024-11-14 PROCEDURE — 99999 PR PBB SHADOW E&M-EST. PATIENT-LVL IV: CPT | Mod: PBBFAC,,, | Performed by: PHYSICIAN ASSISTANT

## 2024-11-14 PROCEDURE — 4010F ACE/ARB THERAPY RXD/TAKEN: CPT | Mod: CPTII,S$GLB,, | Performed by: PHYSICIAN ASSISTANT

## 2024-11-14 PROCEDURE — 3066F NEPHROPATHY DOC TX: CPT | Mod: CPTII,S$GLB,, | Performed by: PHYSICIAN ASSISTANT

## 2024-11-14 PROCEDURE — 1159F MED LIST DOCD IN RCRD: CPT | Mod: CPTII,S$GLB,, | Performed by: PHYSICIAN ASSISTANT

## 2024-11-14 PROCEDURE — 3288F FALL RISK ASSESSMENT DOCD: CPT | Mod: CPTII,S$GLB,, | Performed by: PHYSICIAN ASSISTANT

## 2024-11-14 RX ORDER — SEMAGLUTIDE 0.68 MG/ML
0.25 INJECTION, SOLUTION SUBCUTANEOUS
Qty: 1 EACH | Refills: 0 | Status: SHIPPED | OUTPATIENT
Start: 2024-11-14 | End: 2024-12-14

## 2024-11-14 RX ORDER — DIAZEPAM 5 MG/1
5 TABLET ORAL
COMMUNITY
Start: 2024-08-06 | End: 2024-11-14

## 2024-11-14 NOTE — ASSESSMENT & PLAN NOTE
A1c has been gradually worsening.  We discussed in details her GI issues today.  She is no longer having any nausea.  We discussed that this can be a side effect of Ozempic.  Endocrinology held off on doing this because of her GI symptoms.  Patient has not had formal swallowing study, however, she has not had any aspiration or choking episodes.  I do not believe Ozempic would affect oropharyngeal dysphagia.  Although she has not been taking the proper dose of her days in friend, adding low-dose Ozempic along with her increasing insulin to recommended dosage will help to get her closer to A1c goal.  We did discussed diet today and then she can change such as pastas made out of other items, sugar free candies and low carb ice creams.  She also could substitute fruit instead of candy.  Discussed fiber intake to slow absorption sugars.  Will have her follow back up in 1 month

## 2024-11-14 NOTE — PROGRESS NOTES
Primary Care Provider Appointment   [facility]       Patient ID: Andra Newell is a 64 y.o. female.    ASSESSMENT/PLAN by Problem List:    1. Type 2 diabetes mellitus with stage 3a chronic kidney disease, with long-term current use of insulin  Assessment & Plan:  A1c has been gradually worsening.  We discussed in details her GI issues today.  She is no longer having any nausea.  We discussed that this can be a side effect of Ozempic.  Endocrinology held off on doing this because of her GI symptoms.  Patient has not had formal swallowing study, however, she has not had any aspiration or choking episodes.  I do not believe Ozempic would affect oropharyngeal dysphagia.  Although she has not been taking the proper dose of her days in friend, adding low-dose Ozempic along with her increasing insulin to recommended dosage will help to get her closer to A1c goal.  We did discussed diet today and then she can change such as pastas made out of other items, sugar free candies and low carb ice creams.  She also could substitute fruit instead of candy.  Discussed fiber intake to slow absorption sugars.  Will have her follow back up in 1 month    Orders:  -     semaglutide (OZEMPIC) 0.25 mg or 0.5 mg (2 mg/3 mL) pen injector; Inject 0.25 mg into the skin every 7 days.  Dispense: 1 each; Refill: 0    2. Screening mammogram for breast cancer  -     Mammo Digital Screening Bilat w/ Ludwig; Future; Expected date: 11/14/2024    We briefly discussed colonoscopy, however, she would like to get through possible surgery with Neurosurgery before having another procedure.  She will not be getting any vaccinations as of yet.  She did have diabetes urine screening in May, I am unsure why this is showing health maintenance.    Follow Up:  1 month    My total time spent on this encounter was 78 minutes which included  the following activities: preparing to see the patient, performing a medically  appropriate and/or evaluation, counseling and educating the patient and family/caregiver, ordering medications, tests, or procedures, referring and communicating with other healthcare providers, documenting clinical information in the electronic or other health record, and independently interpreting results. This time is independent and non-overlapping.    Health Maintenance         Date Due Completion Date    Shingles Vaccine (1 of 2) Never done ---    RSV Vaccine (Age 60+ and Pregnant patients) (1 - Risk 60-74 years 1-dose series) Never done ---    COVID-19 Vaccine (3 - Pfizer risk series) 08/11/2021 7/14/2021    Diabetes Urine Screening 01/04/2024 1/4/2023    Pneumococcal Vaccines (Age 65+) (3 of 3 - PPSV23 or PCV20) 04/30/2024 4/30/2019    Colorectal Cancer Screening 05/09/2024 5/9/2019    Influenza Vaccine (1) 09/01/2024 10/12/2023    Override on 10/6/2020: Done (completed at [facility])    Mammogram 11/11/2024 11/11/2023    Hemoglobin A1c 11/28/2024 8/28/2024    Eye Exam 05/07/2025 5/7/2024    Foot Exam 05/08/2025 5/8/2024    Override on 10/2/2019: Done    Lipid Panel 05/09/2025 5/9/2024    High Dose Statin 11/14/2025 11/14/2024    Aspirin/Antiplatelet Therapy 11/14/2025 11/14/2024    DEXA Scan 09/12/2026 9/12/2023    TETANUS VACCINE 07/12/2027 7/12/2017            Advance Care Planning     Date: 11/14/2024    Power of   I initiated the process of voluntary advance care planning today and explained the importance of this process to the patient.  I introduced the concept of advance directives to the patient, as well. Then the patient received detailed information about the importance of designating a Health Care Power of  (HCPOA). She was also instructed to communicate with this person about their wishes for future healthcare, should she become sick and lose decision-making capacity. The patient has not previously appointed a HCPOA. After our discussion, the patient has decided to complete a  IVISOA and has appointed her significant other, health care agent:  Any Gustafsonpatricia  & health care agent number:  on file . I encouraged her to communicate with this person about their wishes for future healthcare, should she become sick and lose decision-making capacity.      A total of 5 min was spent on advance care planning, goals of care discussion, emotional support, formulating and communicating prognosis and exploring burden/benefit of various approaches of treatment. This discussion occurred on a fully voluntary basis with the verbal consent of the patient and/or family.     She does have a living will drawn up.  She will bring me a copy at next visit in December.      Subjective:     Chief Complaint   Patient presents with    Follow-up     I have reviewed the information entered by the ancillary staff regarding the chief complaint as well as the related history.    64-year-old female presents for follow-up.  Patient is new to me but established with the clinic.  Patient's only current complaint is her low back pain.  She has seen Dr. Tran, a spine surgeon.  They just completed MRIs.  She will be following up with him later this month.  She states they will likely go to surgery.  She had a pain pump in her back that was taken out last December.  The medication was causing her severe nausea.  Since removal of the pain pump pain has increased driving many for treatment.  She denies any current nausea.  She did have GI bug a couple of months back but this did not last very long.  She also mentions swallowing difficulty.  Duration describes it sounds like in the oropharyngeal phase.  She states she has difficulty swallowing in the left.  She can swallow food, pills and liquids.  She denies any recent choking or aspiration.    She is followed by endocrinology for her diabetes.  She is currently taking 500 mg metformin extended release twice daily. She also takes NovoLog before meals and for corrections.  At our  last visit with endocrinology she was instructed to increase the Tresiba to 24 units from 17 units.  She did not understand this and has only been taking 17 units.  Her last A1c prior to that visit was 8.5 which is up significantly from 7.3 in May.  Patient does admit that she does not watch her diet very closely.  She does eat pasta and breads.  She also has sugary treats usually once a day.  She does not get any exercise due to the severe back pain.        Patient is a/an 64 y.o.  female       For complete problem list, past medical history, surgical history, social history, etc., see appropriate section in the electronic medical record    Review of Systems   Constitutional:  Negative for fever.   HENT:  Positive for trouble swallowing.    Respiratory:  Negative for choking and shortness of breath.    Musculoskeletal:  Positive for arthralgias and back pain.   Neurological:  Positive for tremors.   All other systems reviewed and are negative.      Objective     Physical Exam  Vitals and nursing note reviewed.   Constitutional:       General: She is not in acute distress.     Appearance: Normal appearance. She is not ill-appearing.   HENT:      Head: Normocephalic and atraumatic.      Right Ear: External ear normal.      Left Ear: External ear normal.   Eyes:      General:         Right eye: No discharge.         Left eye: No discharge.      Extraocular Movements: Extraocular movements intact.      Conjunctiva/sclera: Conjunctivae normal.   Cardiovascular:      Rate and Rhythm: Normal rate and regular rhythm.      Heart sounds: Murmur heard.   Pulmonary:      Effort: Pulmonary effort is normal. No respiratory distress.   Skin:     General: Skin is warm and dry.      Coloration: Skin is not jaundiced or pale.   Neurological:      Mental Status: She is alert.   Psychiatric:         Mood and Affect: Mood normal.         Behavior: Behavior normal.         Thought Content: Thought content normal.         Judgment:  Judgment normal.       Vitals:    11/14/24 1059   BP: 126/80   BP Location: Right arm   Patient Position: Sitting   Pulse: 96   SpO2: 98%         THIS DOCUMENT WAS MADE IN PART WITH VOICE RECOGNITION SOFTWARE.  OCCASIONALLY THIS SOFTWARE WILL MISINTERPRET WORDS OR PHRASES.

## 2024-11-27 ENCOUNTER — LAB VISIT (OUTPATIENT)
Dept: LAB | Facility: HOSPITAL | Age: 64
End: 2024-11-27
Attending: NURSE PRACTITIONER
Payer: MEDICARE

## 2024-11-27 DIAGNOSIS — E11.22 TYPE 2 DIABETES MELLITUS WITH STAGE 3A CHRONIC KIDNEY DISEASE, WITH LONG-TERM CURRENT USE OF INSULIN: ICD-10-CM

## 2024-11-27 DIAGNOSIS — Z79.4 TYPE 2 DIABETES MELLITUS WITH STAGE 3A CHRONIC KIDNEY DISEASE, WITH LONG-TERM CURRENT USE OF INSULIN: ICD-10-CM

## 2024-11-27 DIAGNOSIS — N18.31 TYPE 2 DIABETES MELLITUS WITH STAGE 3A CHRONIC KIDNEY DISEASE, WITH LONG-TERM CURRENT USE OF INSULIN: ICD-10-CM

## 2024-11-27 LAB
ESTIMATED AVG GLUCOSE: 186 MG/DL (ref 68–131)
HBA1C MFR BLD: 8.1 % (ref 4–5.6)

## 2024-11-27 PROCEDURE — 36415 COLL VENOUS BLD VENIPUNCTURE: CPT | Mod: PO | Performed by: NURSE PRACTITIONER

## 2024-11-27 PROCEDURE — 83036 HEMOGLOBIN GLYCOSYLATED A1C: CPT | Performed by: NURSE PRACTITIONER

## 2024-12-04 ENCOUNTER — OFFICE VISIT (OUTPATIENT)
Dept: ENDOCRINOLOGY | Facility: CLINIC | Age: 64
End: 2024-12-04
Payer: MEDICARE

## 2024-12-04 VITALS
SYSTOLIC BLOOD PRESSURE: 122 MMHG | HEART RATE: 88 BPM | WEIGHT: 204.13 LBS | OXYGEN SATURATION: 97 % | BODY MASS INDEX: 32.81 KG/M2 | HEIGHT: 66 IN | DIASTOLIC BLOOD PRESSURE: 74 MMHG

## 2024-12-04 DIAGNOSIS — E11.22 TYPE 2 DIABETES MELLITUS WITH STAGE 3A CHRONIC KIDNEY DISEASE, WITH LONG-TERM CURRENT USE OF INSULIN: ICD-10-CM

## 2024-12-04 DIAGNOSIS — N18.30 HYPERTENSION ASSOCIATED WITH STAGE 3 CHRONIC KIDNEY DISEASE DUE TO TYPE 2 DIABETES MELLITUS: Primary | Chronic | ICD-10-CM

## 2024-12-04 DIAGNOSIS — Z94.0 KIDNEY TRANSPLANT RECIPIENT: Chronic | ICD-10-CM

## 2024-12-04 DIAGNOSIS — N18.31 TYPE 2 DIABETES MELLITUS WITH STAGE 3A CHRONIC KIDNEY DISEASE, WITH LONG-TERM CURRENT USE OF INSULIN: ICD-10-CM

## 2024-12-04 DIAGNOSIS — Z79.4 TYPE 2 DIABETES MELLITUS WITH STAGE 3A CHRONIC KIDNEY DISEASE, WITH LONG-TERM CURRENT USE OF INSULIN: ICD-10-CM

## 2024-12-04 DIAGNOSIS — I12.9 HYPERTENSION ASSOCIATED WITH STAGE 3 CHRONIC KIDNEY DISEASE DUE TO TYPE 2 DIABETES MELLITUS: Primary | Chronic | ICD-10-CM

## 2024-12-04 DIAGNOSIS — E03.9 ACQUIRED HYPOTHYROIDISM: Chronic | ICD-10-CM

## 2024-12-04 DIAGNOSIS — E11.22 HYPERTENSION ASSOCIATED WITH STAGE 3 CHRONIC KIDNEY DISEASE DUE TO TYPE 2 DIABETES MELLITUS: Primary | Chronic | ICD-10-CM

## 2024-12-04 RX ORDER — SEMAGLUTIDE 0.68 MG/ML
INJECTION, SOLUTION SUBCUTANEOUS
Qty: 9 ML | Refills: 3 | Status: SHIPPED | OUTPATIENT
Start: 2024-12-04

## 2024-12-04 NOTE — PROGRESS NOTES
CC: This 64 y.o. female  presents for management of diabetes  and chronic conditions pending review including HTN, HLP, ESRD s/p kidney txp 01/14/2019, hypothyroidism, vitamin d deficiency, obesity      HPI: She  was diagnosed with T2DM in 2005. Has never been hospitalized r/t DM.  Family hx of DM: grandmother    No acute events since her last visit    At last visit was having a lot on n/v- was found to be r/t fentanyl    Has cleaned up diet since her last visit    Wearing Dexcom G7 sensor- see download in Media tab;  2% Very High  36% High  62% In Range  0% Low  0% Very Low  GMI 7.5%  Bg much improved since last visit  Does have pp excursions, may miss or take insulin late when eating out     Diet: Eats 2-3 Meals a day, snacks- fruit  B- Granola bar or banana or Special K  L- TV dinner    D- home cooked   Exercise: none   CURRENT DM MEDS: Tresiba 24 u qam, Novolog 12 u Ac + correction, metformin xr 500 mg bid, Ozempic 0.25 mg weekly x past 2 weeks (Saturday)  Timing prandial insulin 5-15 minutes before meals: yes and no  Vial/pen:  Uses pens    Standards of Care:  Eye exam: Dr. Ortega 5/7/2024- will need cataract sx on both bilaterally     Regarding hypothyroidism   Fmh mom  Takes LT4 with all her other meds   No hoarseness, voice changes, dysphagia, no compressive symptoms, or head/neck exposure to XRT.   No personal or FH of thyroid cancer or MEN syndrome.   Not taking biotin.   + tremors of the hands  + intolerance to the heat    + hair loss       ROS: Gen: Appetite good, weight gain 7 lbs  Eyes: Denies visual disturbances  Resp: no SOB  Cardiac:  palpitations- rarely notices when she's anxious ,  No chest pain-   GI: no nausea, vomiting,  diarrhea     /GYN: 1-2+ nocturia, no burning or pain.   MS/Neuro: + left leg pain and tingling- r/t back issues, speech clear    PE:  GENERAL: Well developed, well nourished.  PSYCH: AAOx3, appropriate mood and affect, pleasant expression, conversant, appears relaxed, well  "groomed.   EYES: Conjunctiva, corneas clear  NECK: Supple, trachea midline,   SKIN:   no acanthosis nigracans.  FOOT EXAMINATION:  5/8/2024  No foot deformity, corns or callus formation,  nails in good condition and well trimmed, no interspace maceration or ulceration noted.  Decreased hair growth present over toes/feet.  Protective sensation intact with 10 gram monofilament.  +2 dorsalis pedis and posterior pulses noted.     Personally reviewed Past Medical, Surgical, Social History.    /74 (BP Location: Right arm, Patient Position: Sitting)   Pulse 88   Ht 5' 6" (1.676 m)   Wt 92.6 kg (204 lb 2.3 oz)   LMP 02/28/2012   SpO2 97%   BMI 32.95 kg/m²      Personally reviewed the below labs:      Chemistry        Component Value Date/Time     02/21/2024 1013    K 4.9 02/21/2024 1013     02/21/2024 1013    CO2 28 02/21/2024 1013    BUN 22 02/21/2024 1013    CREATININE 1.1 02/21/2024 1013     (H) 02/21/2024 1013        Component Value Date/Time    CALCIUM 10.0 02/21/2024 1013    ALKPHOS 171 (H) 01/16/2024 1132    AST 39 01/16/2024 1132    ALT 45 (H) 01/16/2024 1132    BILITOT 0.5 01/16/2024 1132    ESTGFRAFRICA >60.0 06/09/2022 0954    EGFRNONAA 59.7 (A) 06/09/2022 0954            Lab Results   Component Value Date    TSH 0.499 05/01/2024       Recent Labs   Lab 05/09/24  1125   LDL Cholesterol 58.8 L   HDL 36 L   Cholesterol 129        Results for orders placed or performed during the hospital encounter of 04/02/18   Vitamin D    Collection Time: 04/03/18 11:39 AM   Result Value Ref Range    Vit D, 25-Hydroxy 12 (L) 30 - 96 ng/mL     Results for orders placed or performed during the hospital encounter of 04/12/18   Calcitriol    Collection Time: 04/13/18  4:13 AM   Result Value Ref Range    Vit D, 1,25-Dihydroxy 6 (L) 20 - 79 pg/mL       Lab Results   Component Value Date    MICALBCREAT 8.1 01/04/2023       Hemoglobin A1C   Date Value Ref Range Status   11/27/2024 8.1 (H) 4.0 - 5.6 % Final "     Comment:     ADA Screening Guidelines:  5.7-6.4%  Consistent with prediabetes  >or=6.5%  Consistent with diabetes    High levels of fetal hemoglobin interfere with the HbA1C  assay. Heterozygous hemoglobin variants (HbS, HgC, etc)do  not significantly interfere with this assay.   However, presence of multiple variants may affect accuracy.     08/28/2024 8.5 (H) 4.0 - 5.6 % Final     Comment:     ADA Screening Guidelines:  5.7-6.4%  Consistent with prediabetes  >or=6.5%  Consistent with diabetes    High levels of fetal hemoglobin interfere with the HbA1C  assay. Heterozygous hemoglobin variants (HbS, HgC, etc)do  not significantly interfere with this assay.   However, presence of multiple variants may affect accuracy.     05/01/2024 7.3 (H) 4.0 - 5.6 % Final     Comment:     ADA Screening Guidelines:  5.7-6.4%  Consistent with prediabetes  >or=6.5%  Consistent with diabetes    High levels of fetal hemoglobin interfere with the HbA1C  assay. Heterozygous hemoglobin variants (HbS, HgC, etc)do  not significantly interfere with this assay.   However, presence of multiple variants may affect accuracy.          ASSESSMENT and PLAN:      1. 1. T2DM with hyperglycemia, CKD 3-    Continue Tresiba U200 24 u qd,  metformin xr 500 mg bid, Novolog 12 u AC + correction 150 /25,   ONLY take Novolog 5-15 mins prior to a meal, if skipping a meal, skip Novolog  Continue Dexcom G7-  Increase Ozempic to 0.5 mg weekly in 2 weeks- notify me for any issues  SGLT2 will need to be approved by [facility]/nephrology       2. HTN - controlled, continue meds as previously prescribed and monitor.      3. HLP - on statin therapy     4. S/p kidney txp- followed by [facility]     5.  Immunosuppression - agents may increase bg readings     6. Hypothyroidism - takes with all other meds, clinically euthyroid, Last TSH WNL      F/u in 3 months w A1C

## 2024-12-14 DIAGNOSIS — M1A.09X0 IDIOPATHIC CHRONIC GOUT OF MULTIPLE SITES WITHOUT TOPHUS: ICD-10-CM

## 2024-12-16 DIAGNOSIS — E11.22 TYPE 2 DIABETES MELLITUS WITH STAGE 3A CHRONIC KIDNEY DISEASE, WITH LONG-TERM CURRENT USE OF INSULIN: ICD-10-CM

## 2024-12-16 DIAGNOSIS — N18.31 TYPE 2 DIABETES MELLITUS WITH STAGE 3A CHRONIC KIDNEY DISEASE, WITH LONG-TERM CURRENT USE OF INSULIN: ICD-10-CM

## 2024-12-16 DIAGNOSIS — Z79.4 TYPE 2 DIABETES MELLITUS WITH STAGE 3A CHRONIC KIDNEY DISEASE, WITH LONG-TERM CURRENT USE OF INSULIN: ICD-10-CM

## 2024-12-16 RX ORDER — ISOSORBIDE MONONITRATE 30 MG/1
30 TABLET, EXTENDED RELEASE ORAL
Qty: 90 TABLET | Refills: 0 | Status: SHIPPED | OUTPATIENT
Start: 2024-12-16

## 2024-12-16 RX ORDER — COLCHICINE 0.6 MG/1
0.6 TABLET ORAL DAILY
Qty: 90 TABLET | Refills: 3 | Status: SHIPPED | OUTPATIENT
Start: 2024-12-16 | End: 2025-12-16

## 2024-12-17 RX ORDER — BLOOD-GLUCOSE SENSOR
EACH MISCELLANEOUS
Qty: 9 EACH | Refills: 1 | Status: SHIPPED | OUTPATIENT
Start: 2024-12-17

## 2025-01-07 ENCOUNTER — LAB VISIT (OUTPATIENT)
Dept: LAB | Facility: HOSPITAL | Age: 65
End: 2025-01-07
Attending: INTERNAL MEDICINE
Payer: MEDICARE

## 2025-01-07 DIAGNOSIS — Z94.0 KIDNEY REPLACED BY TRANSPLANT: ICD-10-CM

## 2025-01-07 LAB
ALBUMIN SERPL BCP-MCNC: 4.1 G/DL (ref 3.5–5.2)
ALBUMIN SERPL BCP-MCNC: 4.1 G/DL (ref 3.5–5.2)
ALP SERPL-CCNC: 112 U/L (ref 40–150)
ALT SERPL W/O P-5'-P-CCNC: 35 U/L (ref 10–44)
ANION GAP SERPL CALC-SCNC: 10 MMOL/L (ref 8–16)
AST SERPL-CCNC: 29 U/L (ref 10–40)
BASOPHILS # BLD AUTO: 0.04 K/UL (ref 0–0.2)
BASOPHILS NFR BLD: 0.6 % (ref 0–1.9)
BILIRUB DIRECT SERPL-MCNC: 0.3 MG/DL (ref 0.1–0.3)
BILIRUB SERPL-MCNC: 0.8 MG/DL (ref 0.1–1)
BUN SERPL-MCNC: 22 MG/DL (ref 8–23)
CALCIUM SERPL-MCNC: 9.8 MG/DL (ref 8.7–10.5)
CHLORIDE SERPL-SCNC: 103 MMOL/L (ref 95–110)
CO2 SERPL-SCNC: 26 MMOL/L (ref 23–29)
CREAT SERPL-MCNC: 1.4 MG/DL (ref 0.5–1.4)
DIFFERENTIAL METHOD BLD: ABNORMAL
EOSINOPHIL # BLD AUTO: 0.5 K/UL (ref 0–0.5)
EOSINOPHIL NFR BLD: 6.9 % (ref 0–8)
ERYTHROCYTE [DISTWIDTH] IN BLOOD BY AUTOMATED COUNT: 12.8 % (ref 11.5–14.5)
EST. GFR  (NO RACE VARIABLE): 41.5 ML/MIN/1.73 M^2
GLUCOSE SERPL-MCNC: 158 MG/DL (ref 70–110)
HCT VFR BLD AUTO: 41.4 % (ref 37–48.5)
HGB BLD-MCNC: 14.1 G/DL (ref 12–16)
IMM GRANULOCYTES # BLD AUTO: 0.02 K/UL (ref 0–0.04)
IMM GRANULOCYTES NFR BLD AUTO: 0.3 % (ref 0–0.5)
LYMPHOCYTES # BLD AUTO: 1.2 K/UL (ref 1–4.8)
LYMPHOCYTES NFR BLD: 18.5 % (ref 18–48)
MAGNESIUM SERPL-MCNC: 1.4 MG/DL (ref 1.6–2.6)
MCH RBC QN AUTO: 31.5 PG (ref 27–31)
MCHC RBC AUTO-ENTMCNC: 34.1 G/DL (ref 32–36)
MCV RBC AUTO: 92 FL (ref 82–98)
MONOCYTES # BLD AUTO: 0.6 K/UL (ref 0.3–1)
MONOCYTES NFR BLD: 8.8 % (ref 4–15)
NEUTROPHILS # BLD AUTO: 4.2 K/UL (ref 1.8–7.7)
NEUTROPHILS NFR BLD: 64.9 % (ref 38–73)
NRBC BLD-RTO: 0 /100 WBC
PHOSPHATE SERPL-MCNC: 3.8 MG/DL (ref 2.7–4.5)
PLATELET # BLD AUTO: 160 K/UL (ref 150–450)
PMV BLD AUTO: 10 FL (ref 9.2–12.9)
POTASSIUM SERPL-SCNC: 4.7 MMOL/L (ref 3.5–5.1)
PROT SERPL-MCNC: 7.8 G/DL (ref 6–8.4)
RBC # BLD AUTO: 4.48 M/UL (ref 4–5.4)
SODIUM SERPL-SCNC: 139 MMOL/L (ref 136–145)
WBC # BLD AUTO: 6.49 K/UL (ref 3.9–12.7)

## 2025-01-07 PROCEDURE — 80197 ASSAY OF TACROLIMUS: CPT | Performed by: INTERNAL MEDICINE

## 2025-01-07 PROCEDURE — 80076 HEPATIC FUNCTION PANEL: CPT | Performed by: INTERNAL MEDICINE

## 2025-01-07 PROCEDURE — 36415 COLL VENOUS BLD VENIPUNCTURE: CPT | Mod: PO | Performed by: INTERNAL MEDICINE

## 2025-01-07 PROCEDURE — 85025 COMPLETE CBC W/AUTO DIFF WBC: CPT | Performed by: INTERNAL MEDICINE

## 2025-01-07 PROCEDURE — 83735 ASSAY OF MAGNESIUM: CPT | Performed by: INTERNAL MEDICINE

## 2025-01-07 PROCEDURE — 80069 RENAL FUNCTION PANEL: CPT | Performed by: INTERNAL MEDICINE

## 2025-01-07 RX ORDER — TACROLIMUS 1 MG/1
1 CAPSULE ORAL EVERY 12 HOURS
Qty: 60 CAPSULE | Refills: 11 | Status: SHIPPED | OUTPATIENT
Start: 2025-01-07 | End: 2026-01-07

## 2025-01-08 LAB — TACROLIMUS BLD-MCNC: 4.6 NG/ML (ref 5–15)

## 2025-01-09 ENCOUNTER — OFFICE VISIT (OUTPATIENT)
Dept: NEPHROLOGY | Facility: CLINIC | Age: 65
End: 2025-01-09
Payer: MEDICARE

## 2025-01-09 VITALS
WEIGHT: 204.13 LBS | SYSTOLIC BLOOD PRESSURE: 92 MMHG | HEIGHT: 66 IN | DIASTOLIC BLOOD PRESSURE: 64 MMHG | HEART RATE: 104 BPM | BODY MASS INDEX: 32.81 KG/M2 | OXYGEN SATURATION: 97 %

## 2025-01-09 DIAGNOSIS — D84.821 IMMUNOSUPPRESSION DUE TO DRUG THERAPY: ICD-10-CM

## 2025-01-09 DIAGNOSIS — Z94.0 KIDNEY REPLACED BY TRANSPLANT: Primary | ICD-10-CM

## 2025-01-09 DIAGNOSIS — Z79.899 IMMUNOSUPPRESSION DUE TO DRUG THERAPY: ICD-10-CM

## 2025-01-09 PROCEDURE — 3066F NEPHROPATHY DOC TX: CPT | Mod: CPTII,S$GLB,, | Performed by: INTERNAL MEDICINE

## 2025-01-09 PROCEDURE — 3078F DIAST BP <80 MM HG: CPT | Mod: CPTII,S$GLB,, | Performed by: INTERNAL MEDICINE

## 2025-01-09 PROCEDURE — 3074F SYST BP LT 130 MM HG: CPT | Mod: CPTII,S$GLB,, | Performed by: INTERNAL MEDICINE

## 2025-01-09 PROCEDURE — 3008F BODY MASS INDEX DOCD: CPT | Mod: CPTII,S$GLB,, | Performed by: INTERNAL MEDICINE

## 2025-01-09 PROCEDURE — 1157F ADVNC CARE PLAN IN RCRD: CPT | Mod: CPTII,S$GLB,, | Performed by: INTERNAL MEDICINE

## 2025-01-09 PROCEDURE — 1159F MED LIST DOCD IN RCRD: CPT | Mod: CPTII,S$GLB,, | Performed by: INTERNAL MEDICINE

## 2025-01-09 PROCEDURE — 99999 PR PBB SHADOW E&M-EST. PATIENT-LVL IV: CPT | Mod: PBBFAC,,, | Performed by: INTERNAL MEDICINE

## 2025-01-09 PROCEDURE — 99214 OFFICE O/P EST MOD 30 MIN: CPT | Mod: S$GLB,,, | Performed by: INTERNAL MEDICINE

## 2025-01-09 PROCEDURE — 1160F RVW MEDS BY RX/DR IN RCRD: CPT | Mod: CPTII,S$GLB,, | Performed by: INTERNAL MEDICINE

## 2025-01-09 NOTE — PROGRESS NOTES
"  Subjective:       Patient ID: Andra Newell is a 64 y.o. White female who presents for return patient evaluation for chronic renal failure.      She is s/p kidney transplant in 2019.  She is on prograf 1/1.  She had her back pain pump removed and is having more back pain lately.      Review of Systems   Constitutional:  Positive for fatigue. Negative for appetite change, chills and fever.   HENT:  Negative for congestion.    Eyes:  Negative for visual disturbance.   Respiratory:  Negative for cough and shortness of breath.    Cardiovascular:  Negative for chest pain and leg swelling.   Gastrointestinal:  Negative for abdominal pain, diarrhea, nausea and vomiting.   Genitourinary:  Positive for urgency. Negative for difficulty urinating, dysuria and hematuria.   Musculoskeletal:  Positive for arthralgias (R knee) and back pain. Negative for myalgias.   Skin:  Negative for rash.   Neurological:  Positive for tremors (L hand) and weakness. Negative for headaches.   Psychiatric/Behavioral:  Negative for sleep disturbance.        The past medical, family and social histories were reviewed for this encounter.     BP 92/64 (BP Location: Right arm, Patient Position: Sitting)   Pulse 104   Ht 5' 6" (1.676 m)   Wt 92.6 kg (204 lb 2.3 oz)   LMP 02/28/2012   SpO2 97%   BMI 32.95 kg/m²     Objective:      Physical Exam  Vitals reviewed.   Constitutional:       General: She is not in acute distress.     Appearance: She is well-developed.   HENT:      Head: Normocephalic and atraumatic.   Eyes:      General: No scleral icterus.     Conjunctiva/sclera: Conjunctivae normal.   Neck:      Vascular: No JVD.   Cardiovascular:      Rate and Rhythm: Normal rate and regular rhythm.      Heart sounds: Normal heart sounds. No murmur heard.     No friction rub. No gallop.   Pulmonary:      Effort: Pulmonary effort is normal. No respiratory distress.      Breath sounds: Normal breath sounds. No wheezing.   Abdominal:      " General: Bowel sounds are normal. There is no distension.      Palpations: Abdomen is soft.      Tenderness: There is no abdominal tenderness.   Musculoskeletal:      Cervical back: Normal range of motion.      Right lower leg: No edema.      Left lower leg: No edema.      Comments: LUE AVF with one aneurysmal area with bruit   Skin:     General: Skin is warm and dry.      Findings: No rash.   Neurological:      Mental Status: She is alert and oriented to person, place, and time.   Psychiatric:         Mood and Affect: Mood normal.         Behavior: Behavior normal.         Assessment:       1. Kidney replaced by transplant    2. Immunosuppression due to drug therapy        Lab Results   Component Value Date    CREATININE 1.4 01/07/2025    BUN 22 01/07/2025     01/07/2025    K 4.7 01/07/2025     01/07/2025    CO2 26 01/07/2025     Lab Results   Component Value Date    PTH 68.2 05/01/2024    CALCIUM 9.8 01/07/2025    PHOS 3.8 01/07/2025     Lab Results   Component Value Date    HCT 41.4 01/07/2025     Prot/Creat Ratio, Urine   Date Value Ref Range Status   01/07/2025 0.16 0.00 - 0.20 Final   05/01/2024 0.09 0.00 - 0.20 Final   02/21/2024 0.09 0.00 - 0.20 Final     Plan:   Return to clinic in 4 months.  Labs for next visit include labs per standing orders.    Baseline creatinine is 0.9-1.3 since her transplant.  PG level is 4.6.  I will confirm if her target level is 4-6.  PTH is 68 with  calcium of 9.8.  UPC is negative.  Derm referral for skin survey.  I question if her fatigue over the past couple of months is related to her blood pressure being too low thus she will check at home and stop the amlodipine if needed.

## 2025-01-09 NOTE — ASSESSMENT & PLAN NOTE
Last A1c did increase but still reasonable.  She has labs and follow-up with Endocrinology scheduled.  
Likely multifactorial.  She did have one fall with an exacerbation of her back pain but no acute neurological deficits other than some generalized weakness.  Offered PT but cost and co-pay is a concern.  So I have referred her to our health  here to recommend a gentle exercise program, starting with an exercise bike.  However if she has difficulty with this I would want a referral to physical therapy and possible referral back to her neurologist.  She does have a follow-up with her pain doctor and states they are planning an MRI of her lumbar spine she will keep me informed of the results  
Patient has noticed some worsening of the tremor particularly in the left arm.  She did consult with Neurology last year and they felt it was multifactorial.  She is concerned about the Vraylar although I assured her I do not see signs of tardive dyskinesia.  She has a rapid tremor short amplitude very possibly essential tremor.  We did discuss that there are medication options that might help such as propranolol or primidone.  But symptoms are not bad enough right now.  She will monitor.  If there is any significant change or worsening I would also offer referral back to Neurology  
Stable and satisfactory.  Continue current medications.  
Was Ultrasound Performed Today?: Yes
Calculate Total Cumulative Dose Automatically Or Manually: Manually
Additional Change To Daily Dosage Administered Mid Treatment?: No
Prescription Used: 1
Ultrasound Used Text: High frequency ultrasound depth is 0.70 mm, which is 0.18 mm in difference from previous imaging.
Fraction Number: 9
Ultrasound Not Used Text: Ultrasound was not performed today due to
Energy (Kv): 70
Bill For Simulation (Per Medicare, Typical Course Of Radiation Therapy Will Require Between One To Three Simulations): Yes- (Simple- 1 Site: 61561)
Daily Dosage (Cgy): 272.16
Treatment Documentation: This patient has been treated today with image-guided superficial radiation therapy for non-melanoma skin cancer. Written informed consent has been previously obtained from this patient for this treatment. This consent is documented in the patient's chart. The patient gave verbal consent to continue treatment today. The patient was treated with a specific radiation dose and setup as prescribed by the provider listed on this visit note. A Radiation Therapist performed administration of radiation under the supervision of a provider. The treatment parameters and cumulative dose are indicated above. Prior to administering the radiation, the patient underwent a verification therapeutic radiology simulation-aided field setting defining relevant normal and abnormal target anatomy and acquiring images with separate and distinct diagnostic high-frequency ultrasound to delineate tissues and determine whether to proceed with delivery of therapeutic, in addition to retrieve data necessary to develop an optimal radiation treatment process for the patient. The field placement simulation documents any change seen in overall tumor volume documented in the patient’s record, allows the clinician to indicate any needed changes in the treatment plan and/or prescription, provides diagnostic evaluation as the basis for performing the therapeutic procedure, and clearly identifies the information needed to decide to proceed with the therapeutic procedure. This process includes verification of the treatment port(s) and proper treatment positioning. All treatment ports were photographed within electronic medical records. The patient's lead blocking along with gross tumor volume and margin was confirmed. Considering superficial radiotherapy is clinical in setup, this requires the physician and radiation therapist to clarify the location interest being treated against initial images, ultrasound, pathology, and patient anatomy. Care was taken to ensure salvador treated were geometrically accurate and properly positioned using therapeutic radiology simulation-aided field setting verification per fraction. This process is also utilized to determine if any prescription or setup changes are necessary. These steps are therefore medically necessary to ensure safe and effective administration of radiation. Ongoing therapeutic radiology simulation-aided field setting verification is ordered throughout the course of therapy.\\n\\nA high-frequency ultrasound image was acquired today for a two-dimensional evaluation of the tumor volume, depth, width, breadth, review of prior response to treatment, provide geometric accuracy of field placement, and determine whether to proceed with therapeutic delivery. \\n\\nThe field placement and ultrasound imaging, per fraction, is separate and distinct from the initial simulation and is an important task in providing safe administration of superficial radiation therapy. Physician evaluation of the ultrasound information will be ongoing throughout the course of treatment and is deemed medically necessary to ensure the efficacy of treatment, whether to proceed with therapeutic delivery, and determine any necessary changes. Today's images were evaluated for determination of continuation of treatment with the current plan or with necessary changes as appropriate. Additionally, the use of ultrasound visualization and targeted assessment allows the patient to be able to see their cancer(s) progress, encouraging the patient to complete and maintain compliance through the full course of prescribed radiotherapy. Per Dr. Donovan, continued ultrasound guidance and therapeutic radiology simulation-aided field setting verification per fraction is required for field placement, measurement of tumor depth, tissue evaluation, progress, acute effect monitoring, and determination for therapeutic treatment delivery is appropriate.
Additional Comments (Add Customization Of Note Here): Treatment area is showing faint erythema. She has no concerns today, and using ointment daily.
Total Cumulative Dose (Cgy): 2449.44
Add X Modifier?: BYRD - Unusual Non-Overlapping Service
Additional Comments (Add Customization Of Note Here): Treatment area is showing faint erythema. She is using cream daily, with no concerns.
Ultrasound Used Text: High frequency ultrasound depth is 0.72 mm, which is 0.12 mm in difference from previous imaging.

## 2025-01-27 ENCOUNTER — OFFICE VISIT (OUTPATIENT)
Dept: DERMATOLOGY | Facility: CLINIC | Age: 65
End: 2025-01-27
Payer: MEDICARE

## 2025-01-27 VITALS — WEIGHT: 189 LBS | HEIGHT: 66 IN | BODY MASS INDEX: 30.37 KG/M2

## 2025-01-27 DIAGNOSIS — D22.9 MULTIPLE BENIGN NEVI: ICD-10-CM

## 2025-01-27 DIAGNOSIS — L57.0 ACTINIC KERATOSES: ICD-10-CM

## 2025-01-27 DIAGNOSIS — L21.9 SEBORRHEIC DERMATITIS: Primary | ICD-10-CM

## 2025-01-27 DIAGNOSIS — Z79.60 LONG-TERM USE OF IMMUNOSUPPRESSANT MEDICATION: ICD-10-CM

## 2025-01-27 DIAGNOSIS — L82.1 SEBORRHEIC KERATOSES: ICD-10-CM

## 2025-01-27 DIAGNOSIS — L81.4 SOLAR LENTIGO: ICD-10-CM

## 2025-01-27 DIAGNOSIS — Z12.83 SKIN CANCER SCREENING: ICD-10-CM

## 2025-01-27 DIAGNOSIS — L85.3 XEROSIS CUTIS: ICD-10-CM

## 2025-01-27 PROCEDURE — 99204 OFFICE O/P NEW MOD 45 MIN: CPT | Mod: 25,S$GLB,, | Performed by: DERMATOLOGY

## 2025-01-27 PROCEDURE — 3008F BODY MASS INDEX DOCD: CPT | Mod: CPTII,S$GLB,, | Performed by: DERMATOLOGY

## 2025-01-27 PROCEDURE — 1101F PT FALLS ASSESS-DOCD LE1/YR: CPT | Mod: CPTII,S$GLB,, | Performed by: DERMATOLOGY

## 2025-01-27 PROCEDURE — 1160F RVW MEDS BY RX/DR IN RCRD: CPT | Mod: CPTII,S$GLB,, | Performed by: DERMATOLOGY

## 2025-01-27 PROCEDURE — 1159F MED LIST DOCD IN RCRD: CPT | Mod: CPTII,S$GLB,, | Performed by: DERMATOLOGY

## 2025-01-27 PROCEDURE — 3288F FALL RISK ASSESSMENT DOCD: CPT | Mod: CPTII,S$GLB,, | Performed by: DERMATOLOGY

## 2025-01-27 PROCEDURE — 3066F NEPHROPATHY DOC TX: CPT | Mod: CPTII,S$GLB,, | Performed by: DERMATOLOGY

## 2025-01-27 PROCEDURE — 17003 DESTRUCT PREMALG LES 2-14: CPT | Mod: S$GLB,,, | Performed by: DERMATOLOGY

## 2025-01-27 PROCEDURE — 1157F ADVNC CARE PLAN IN RCRD: CPT | Mod: CPTII,S$GLB,, | Performed by: DERMATOLOGY

## 2025-01-27 PROCEDURE — 17000 DESTRUCT PREMALG LESION: CPT | Mod: S$GLB,,, | Performed by: DERMATOLOGY

## 2025-01-27 RX ORDER — BETAMETHASONE DIPROPIONATE 0.5 MG/G
LOTION TOPICAL
Qty: 60 ML | Refills: 2 | Status: SHIPPED | OUTPATIENT
Start: 2025-01-27

## 2025-01-27 RX ORDER — KETOCONAZOLE 20 MG/ML
SHAMPOO, SUSPENSION TOPICAL
Qty: 120 ML | Refills: 5 | Status: SHIPPED | OUTPATIENT
Start: 2025-01-27

## 2025-01-27 RX ORDER — KETOCONAZOLE 20 MG/G
CREAM TOPICAL
Qty: 60 G | Refills: 3 | Status: SHIPPED | OUTPATIENT
Start: 2025-01-27

## 2025-01-27 RX ORDER — HYDROCORTISONE 25 MG/G
CREAM TOPICAL
Qty: 28 G | Refills: 1 | Status: SHIPPED | OUTPATIENT
Start: 2025-01-27

## 2025-01-27 NOTE — PROGRESS NOTES
Subjective:      Patient ID:  Andra Newell is a 64 y.o. female who presents for   Chief Complaint   Patient presents with    Skin Check     TBSE    Rash     Face, scalp     New Patient    Skin Check - TBSE    C/o flaking skin on face, and scalp  Tried many OTC shampoos, no relief    Kidney transplant 8/2018, on prograf BID  Intense sun exposure, retired     Derm Hx:  Denies phx NMSC/MM  Denies fhx MM    Current Outpatient Medications:   ·  acetaminophen (TYLENOL) 650 MG TbSR, Take 650 mg by mouth every 8 (eight) hours., Disp: , Rfl:   ·  allopurinoL (ZYLOPRIM) 100 MG tablet, TAKE 1 TABLET BY MOUTH ONCE DAILY, Disp: 90 tablet, Rfl: 2  ·  amLODIPine (NORVASC) 5 MG tablet, Take 1 tablet by mouth once daily., Disp: , Rfl:   ·  azelastine (ASTELIN) 137 mcg (0.1 %) nasal spray, 1 spray (137 mcg total) by Nasal route 2 (two) times daily as needed for Rhinitis., Disp: 30 mL, Rfl: 3  ·  benzonatate (TESSALON) 100 MG capsule, Take 1 capsule (100 mg total) by mouth 3 (three) times daily as needed for Cough., Disp: 28 capsule, Rfl: 1  ·  blood sugar diagnostic Strp, Check glucose 3 (three) times daily., Disp: 300 each, Rfl: PRN  ·  clopidogreL (PLAVIX) 75 mg tablet, TAKE 1 TABLET BY MOUTH ONCE DAILY, Disp: 90 tablet, Rfl: 3  ·  colchicine (COLCRYS) 0.6 mg tablet, TAKE 1 TABLET (0.6 MG TOTAL) BY MOUTH ONCE DAILY., Disp: 90 tablet, Rfl: 3  ·  DEXCOM G7  Misc, Dispense 1, Disp: 1 each, Rfl: 0  ·  DEXCOM G7 SENSOR Amparo, CHANGE EVERY 10 DAYS AS DIRECTED BY PHYSICIAN, Disp: 9 each, Rfl: 1  ·  doxazosin (CARDURA) 2 MG tablet, Take 2 mg by mouth once daily., Disp: , Rfl:   ·  HYDROcodone-acetaminophen (NORCO)  mg per tablet, Take 1 tablet by mouth every 24 hours as needed for Pain., Disp: , Rfl:   ·  hydroxychloroquine (PLAQUENIL) 200 mg tablet, TAKE 1 TABLET BY MOUTH TWICE DAILY, Disp: 60 tablet, Rfl: 3  ·  insulin aspart U-100 (NOVOLOG FLEXPEN U-100 INSULIN) 100 unit/mL (3 mL) InPn pen, 12 u AC +  "correction max TDD 45u, Disp: 30 mL, Rfl: 4  ·  insulin degludec (TRESIBA FLEXTOUCH U-200) 200 unit/mL (3 mL) insulin pen, 24 u qam, Disp: 2 pen , Rfl: 6  ·  isosorbide mononitrate (IMDUR) 30 MG 24 hr tablet, TAKE 1 TABLET BY MOUTH ONCE DAILY, Disp: 90 tablet, Rfl: 0  ·  levothyroxine (SYNTHROID) 75 MCG tablet, Take 1 tablet (75 mcg total) by mouth once daily., Disp: 90 tablet, Rfl: 3  ·  LIDOcaine (LIDODERM) 5 %, 1 patch., Disp: , Rfl:   ·  LIDOCAINE VISCOUS 2 % solution, , Disp: , Rfl:   ·  LORazepam (ATIVAN) 1 MG tablet, Take 1 mg by mouth every evening., Disp: , Rfl:   ·  losartan (COZAAR) 50 MG tablet, TAKE 1 TABLET BY MOUTH ONCE DAILY FOR BLOOD PRESSURE, Disp: 90 tablet, Rfl: 2  ·  metFORMIN (GLUCOPHAGE-XR) 500 MG ER 24hr tablet, TAKE 1 TABLET (500 MG TOTAL) BY MOUTH 2 TIMES DAILY WITH MEALS., Disp: 180 tablet, Rfl: 1  ·  multivitamin (THERAGRAN) tablet, Take 1 tablet by mouth once daily., Disp: , Rfl:   ·  ondansetron (ZOFRAN) 4 MG tablet, TAKE ONE TABLET BY MOUTH EVERY SIX HOURS, Disp: 20 tablet, Rfl: 1  ·  pen needle, diabetic (BD LENIN 2ND GEN PEN NEEDLE) 32 gauge x 5/32" Ndle, Uses 4 daily, Disp: 400 each, Rfl: 3  ·  rosuvastatin (CRESTOR) 20 MG tablet, Take 20 mg by mouth once daily., Disp: , Rfl:   ·  semaglutide (OZEMPIC) 0.25 mg or 0.5 mg (2 mg/3 mL) pen injector, 0.5 mg weekly, Disp: 9 mL, Rfl: 3  ·  sucralfate (CARAFATE) 1 gram tablet, Take 1 tablet (1 g total) by mouth 4 (four) times daily before meals and nightly., Disp: 120 tablet, Rfl: 3  ·  tacrolimus (PROGRAF) 1 MG Cap, Take 1 capsule (1 mg total) by mouth every 12 (twelve) hours., Disp: 60 capsule, Rfl: 11  ·  tiZANidine (ZANAFLEX) 4 MG tablet, Take 1 tablet by mouth every evening., Disp: , Rfl:   ·  vilazodone (VIIBRYD) 40 mg Tab tablet, Take 1 tablet (40 mg total) by mouth once daily., Disp: 30 tablet, Rfl: 4  ·  VRAYLAR 3 mg Cap, Take 3 mg by mouth once daily., Disp: , Rfl:   ·  lansoprazole (PREVACID) 30 MG capsule, Take 1 capsule (30 mg " total) by mouth 2 (two) times a day., Disp: 60 capsule, Rfl: 1  ·  metoprolol tartrate (LOPRESSOR) 25 MG tablet, Take 0.5 tablets (12.5 mg total) by mouth 2 (two) times daily., Disp: 30 tablet, Rfl: 1  ·  nitroGLYCERIN (NITROSTAT) 0.4 MG SL tablet, Place 1 tablet (0.4 mg total) under the tongue every 5 (five) minutes as needed for Chest pain. (Patient not taking: Reported on 1/9/2025), Disp: 20 tablet, Rfl: 1        Review of Systems   Constitutional:  Negative for fever, chills and fatigue.   Respiratory:  Negative for cough and shortness of breath.    Skin:  Positive for activity-related sunscreen use.   Hematologic/Lymphatic: Does not bruise/bleed easily.       Objective:   Physical Exam   Constitutional: She appears well-developed and well-nourished. No distress.   Neurological: She is alert and oriented to person, place, and time. She is not disoriented.   Psychiatric: She has a normal mood and affect.   Skin:   Areas Examined (abnormalities noted in diagram):   Scalp / Hair Palpated and Inspected  Head / Face Inspection Performed  Neck Inspection Performed  Chest / Axilla Inspection Performed  Abdomen Inspection Performed  Back Inspection Performed  RUE Inspected  LUE Inspection Performed  RLE Inspected  LLE Inspection Performed  Nails and Digits Inspection Performed                     Diagram Legend     Erythematous scaling macule/papule c/w actinic keratosis       Vascular papule c/w angioma      Pigmented verrucoid papule/plaque c/w seborrheic keratosis      Yellow umbilicated papule c/w sebaceous hyperplasia      Irregularly shaped tan macule c/w lentigo     1-2 mm smooth white papules consistent with Milia      Movable subcutaneous cyst with punctum c/w epidermal inclusion cyst      Subcutaneous movable cyst c/w pilar cyst      Firm pink to brown papule c/w dermatofibroma      Pedunculated fleshy papule(s) c/w skin tag(s)      Evenly pigmented macule c/w junctional nevus     Mildly variegated pigmented,  slightly irregular-bordered macule c/w mildly atypical nevus      Flesh colored to evenly pigmented papule c/w intradermal nevus       Pink pearly papule/plaque c/w basal cell carcinoma      Erythematous hyperkeratotic cursted plaque c/w SCC      Surgical scar with no sign of skin cancer recurrence      Open and closed comedones      Inflammatory papules and pustules      Verrucoid papule consistent consistent with wart     Erythematous eczematous patches and plaques     Dystrophic onycholytic nail with subungual debris c/w onychomycosis     Umbilicated papule    Erythematous-base heme-crusted tan verrucoid plaque consistent with inflamed seborrheic keratosis     Erythematous Silvery Scaling Plaque c/w Psoriasis     See annotation      Assessment / Plan:        Seborrheic dermatitis  -     ketoconazole (NIZORAL) 2 % shampoo; Wash hair with medicated shampoo at least 2x/week - let sit on scalp at least 5 minutes prior to rinsing  Dispense: 120 mL; Refill: 5  -     betamethasone dipropionate (BETANATE) 0.05 % lotion; Apply thin film to scalp QHS PRN itch  Dispense: 60 mL; Refill: 2  -     ketoconazole (NIZORAL) 2 % cream; AAA face bid PRN flare  Dispense: 60 g; Refill: 3  -     hydrocortisone 2.5 % cream; Thin film to AA onface daily PRN flare; use short term only  Dispense: 28 g; Refill: 1  OTC zinc soap bar for use on face BID    Long-term use of immunosuppressant medication  Skin cancer screening  -     Ambulatory referral/consult to Dermatology  Total body skin examination performed today including at least 12 points as noted in physical examination. No lesions suspicious for malignancy noted.    Actinic keratoses  Cryosurgery Procedure Note    Verbal consent from the patient is obtained and the patient is aware of the precancerous quality and need for treatment of these lesions. Liquid nitrogen cryosurgery is applied to the 4 actinic keratoses, as detailed in the physical exam, to produce a freeze injury. The  patient is aware that blisters may form and is instructed on wound care with gentle cleansing and use of vaseline ointment to keep moist until healed. The patient is supplied a handout on cryosurgery and is instructed to call if lesions do not completely resolve.    Xerosis cutis  Good skin care regimen discussed including limiting to one bath or shower/day, using lukewarm water with mild soap and moisturizing cream to skin 1 - 2x/day. CERAVE CREAM preferred    Seborrheic keratoses  These are benign inherited growths without a malignant potential. Reassurance given to patient. No treatment is necessary.     Solar lentigo  This is a benign hyperpigmented sun induced lesion. Daily sun protection will reduce the number of new lesions. Treatment of these benign lesions are considered cosmetic.    Multiple benign nevi  Careful dermoscopy evaluation of nevi performed with none identified as needing biopsy today  Monitor for new mole or moles that are becoming bigger, darker, irritated, or developing irregular borders.                  No follow-ups on file.

## 2025-01-28 DIAGNOSIS — M19.041 PRIMARY OSTEOARTHRITIS OF BOTH HANDS: ICD-10-CM

## 2025-01-28 DIAGNOSIS — M19.042 PRIMARY OSTEOARTHRITIS OF BOTH HANDS: ICD-10-CM

## 2025-01-28 RX ORDER — HYDROXYCHLOROQUINE SULFATE 200 MG/1
200 TABLET, FILM COATED ORAL 2 TIMES DAILY
Qty: 60 TABLET | Refills: 3 | OUTPATIENT
Start: 2025-01-28

## 2025-01-30 ENCOUNTER — PATIENT MESSAGE (OUTPATIENT)
Dept: RESEARCH | Facility: HOSPITAL | Age: 65
End: 2025-01-30
Payer: MEDICARE

## 2025-02-04 DIAGNOSIS — Z94.0 KIDNEY REPLACED BY TRANSPLANT: Primary | ICD-10-CM

## 2025-02-09 ENCOUNTER — PATIENT MESSAGE (OUTPATIENT)
Dept: ENDOCRINOLOGY | Facility: CLINIC | Age: 65
End: 2025-02-09
Payer: MEDICARE

## 2025-02-11 ENCOUNTER — TELEPHONE (OUTPATIENT)
Dept: ENDOCRINOLOGY | Facility: CLINIC | Age: 65
End: 2025-02-11
Payer: MEDICARE

## 2025-02-11 DIAGNOSIS — Z79.4 TYPE 2 DIABETES MELLITUS WITH STAGE 3A CHRONIC KIDNEY DISEASE, WITH LONG-TERM CURRENT USE OF INSULIN: Primary | ICD-10-CM

## 2025-02-11 DIAGNOSIS — N18.31 TYPE 2 DIABETES MELLITUS WITH STAGE 3A CHRONIC KIDNEY DISEASE, WITH LONG-TERM CURRENT USE OF INSULIN: Primary | ICD-10-CM

## 2025-02-11 DIAGNOSIS — E11.22 TYPE 2 DIABETES MELLITUS WITH STAGE 3A CHRONIC KIDNEY DISEASE, WITH LONG-TERM CURRENT USE OF INSULIN: Primary | ICD-10-CM

## 2025-02-11 RX ORDER — SEMAGLUTIDE 1.34 MG/ML
1 INJECTION, SOLUTION SUBCUTANEOUS
Qty: 9 ML | Refills: 3 | Status: SHIPPED | OUTPATIENT
Start: 2025-02-11 | End: 2026-02-11

## 2025-02-13 DIAGNOSIS — E03.9 ACQUIRED HYPOTHYROIDISM: Chronic | ICD-10-CM

## 2025-02-13 RX ORDER — LEVOTHYROXINE SODIUM 75 UG/1
75 TABLET ORAL DAILY
Qty: 90 TABLET | Refills: 3 | Status: SHIPPED | OUTPATIENT
Start: 2025-02-13

## 2025-02-14 ENCOUNTER — TELEPHONE (OUTPATIENT)
Dept: TRANSPLANT | Facility: CLINIC | Age: 65
End: 2025-02-14
Payer: MEDICARE

## 2025-02-14 ENCOUNTER — LAB VISIT (OUTPATIENT)
Dept: LAB | Facility: HOSPITAL | Age: 65
End: 2025-02-14
Attending: INTERNAL MEDICINE
Payer: MEDICARE

## 2025-02-14 DIAGNOSIS — N30.01 ACUTE CYSTITIS WITH HEMATURIA: Primary | ICD-10-CM

## 2025-02-14 DIAGNOSIS — N30.01 ACUTE CYSTITIS WITH HEMATURIA: ICD-10-CM

## 2025-02-14 DIAGNOSIS — Z94.0 KIDNEY REPLACED BY TRANSPLANT: ICD-10-CM

## 2025-02-14 LAB
BACTERIA #/AREA URNS HPF: ABNORMAL /HPF
BILIRUB UR QL STRIP: NEGATIVE
CLARITY UR: ABNORMAL
COLOR UR: YELLOW
CREAT UR-MCNC: 157 MG/DL (ref 15–325)
GLUCOSE UR QL STRIP: NEGATIVE
HGB UR QL STRIP: NEGATIVE
KETONES UR QL STRIP: NEGATIVE
LEUKOCYTE ESTERASE UR QL STRIP: ABNORMAL
MICROSCOPIC COMMENT: ABNORMAL
NITRITE UR QL STRIP: NEGATIVE
PH UR STRIP: 6 [PH] (ref 5–8)
PROT UR QL STRIP: NEGATIVE
PROT UR-MCNC: 13 MG/DL (ref 0–15)
PROT/CREAT UR: 0.08 MG/G{CREAT} (ref 0–0.2)
RBC #/AREA URNS HPF: 4 /HPF (ref 0–4)
SP GR UR STRIP: >=1.03 (ref 1–1.03)
SQUAMOUS #/AREA URNS HPF: 9 /HPF
URN SPEC COLLECT METH UR: ABNORMAL
WBC #/AREA URNS HPF: 60 /HPF (ref 0–5)

## 2025-02-14 PROCEDURE — 87088 URINE BACTERIA CULTURE: CPT | Performed by: INTERNAL MEDICINE

## 2025-02-14 PROCEDURE — 82570 ASSAY OF URINE CREATININE: CPT | Performed by: INTERNAL MEDICINE

## 2025-02-14 PROCEDURE — 87186 SC STD MICRODIL/AGAR DIL: CPT | Performed by: INTERNAL MEDICINE

## 2025-02-14 PROCEDURE — 81000 URINALYSIS NONAUTO W/SCOPE: CPT | Mod: PO | Performed by: INTERNAL MEDICINE

## 2025-02-14 PROCEDURE — 87086 URINE CULTURE/COLONY COUNT: CPT | Performed by: INTERNAL MEDICINE

## 2025-02-14 NOTE — TELEPHONE ENCOUNTER
Left voice message to call coordinator back.  ----- Message from July Vasquez MD sent at 2/14/2025  2:48 PM CST -----  Any UTI symptoms?

## 2025-02-14 NOTE — TELEPHONE ENCOUNTER
Patient repeated back and voice a understanding of orders.  C/O Urinary urgency and frequency X 1 day.  Urine C&S added to orders.Spoke with [employer] tech at [facility] lab.  Voice a understanding if she develops a temp greater than 100.4 and or ABD pain, then she needs to go to her local ER for further evaluation.   Agreed to keep coordinator posted.  ----- Message from July Vasquez MD sent at 2/14/2025  2:48 PM CST -----  Any UTI symptoms?

## 2025-02-15 ENCOUNTER — RESULTS FOLLOW-UP (OUTPATIENT)
Dept: TRANSPLANT | Facility: CLINIC | Age: 65
End: 2025-02-15
Payer: MEDICARE

## 2025-02-17 ENCOUNTER — OFFICE VISIT (OUTPATIENT)
Dept: PRIMARY CARE CLINIC | Facility: CLINIC | Age: 65
End: 2025-02-17
Payer: MEDICARE

## 2025-02-17 VITALS
SYSTOLIC BLOOD PRESSURE: 118 MMHG | BODY MASS INDEX: 30.7 KG/M2 | WEIGHT: 191 LBS | DIASTOLIC BLOOD PRESSURE: 70 MMHG | HEIGHT: 66 IN | HEART RATE: 100 BPM | OXYGEN SATURATION: 98 %

## 2025-02-17 DIAGNOSIS — Z79.4 TYPE 2 DIABETES MELLITUS WITH STAGE 3A CHRONIC KIDNEY DISEASE, WITH LONG-TERM CURRENT USE OF INSULIN: Primary | ICD-10-CM

## 2025-02-17 DIAGNOSIS — Z12.11 COLON CANCER SCREENING: ICD-10-CM

## 2025-02-17 DIAGNOSIS — N18.31 TYPE 2 DIABETES MELLITUS WITH STAGE 3A CHRONIC KIDNEY DISEASE, WITH LONG-TERM CURRENT USE OF INSULIN: Primary | ICD-10-CM

## 2025-02-17 DIAGNOSIS — N18.30 HYPERTENSION ASSOCIATED WITH STAGE 3 CHRONIC KIDNEY DISEASE DUE TO TYPE 2 DIABETES MELLITUS: ICD-10-CM

## 2025-02-17 DIAGNOSIS — E78.2 MIXED DIABETIC HYPERLIPIDEMIA ASSOCIATED WITH TYPE 2 DIABETES MELLITUS: ICD-10-CM

## 2025-02-17 DIAGNOSIS — E11.22 HYPERTENSION ASSOCIATED WITH STAGE 3 CHRONIC KIDNEY DISEASE DUE TO TYPE 2 DIABETES MELLITUS: ICD-10-CM

## 2025-02-17 DIAGNOSIS — E11.69 MIXED DIABETIC HYPERLIPIDEMIA ASSOCIATED WITH TYPE 2 DIABETES MELLITUS: ICD-10-CM

## 2025-02-17 DIAGNOSIS — I12.9 HYPERTENSION ASSOCIATED WITH STAGE 3 CHRONIC KIDNEY DISEASE DUE TO TYPE 2 DIABETES MELLITUS: ICD-10-CM

## 2025-02-17 DIAGNOSIS — K21.9 GASTROESOPHAGEAL REFLUX DISEASE WITHOUT ESOPHAGITIS: ICD-10-CM

## 2025-02-17 DIAGNOSIS — E11.22 TYPE 2 DIABETES MELLITUS WITH STAGE 3A CHRONIC KIDNEY DISEASE, WITH LONG-TERM CURRENT USE OF INSULIN: Primary | ICD-10-CM

## 2025-02-17 DIAGNOSIS — Z94.0 KIDNEY REPLACED BY TRANSPLANT: Primary | ICD-10-CM

## 2025-02-17 DIAGNOSIS — N39.0 RECURRENT UTI: ICD-10-CM

## 2025-02-17 LAB — BACTERIA UR CULT: ABNORMAL

## 2025-02-17 RX ORDER — CEPHALEXIN 500 MG/1
500 CAPSULE ORAL EVERY 6 HOURS
Qty: 28 CAPSULE | Refills: 0 | Status: SHIPPED | OUTPATIENT
Start: 2025-02-17 | End: 2025-02-24

## 2025-02-17 RX ORDER — CEPHALEXIN 500 MG/1
500 CAPSULE ORAL 4 TIMES DAILY
Qty: 28 CAPSULE | Refills: 0 | Status: CANCELLED | OUTPATIENT
Start: 2025-02-17 | End: 2025-02-24

## 2025-02-17 RX ORDER — LANSOPRAZOLE 30 MG/1
30 CAPSULE, DELAYED RELEASE ORAL DAILY
Qty: 90 CAPSULE | Refills: 1 | Status: SHIPPED | OUTPATIENT
Start: 2025-02-17

## 2025-02-17 NOTE — PROGRESS NOTES
Primary Care Provider Appointment   [facility]       Patient ID: Andra Newell is a 65 y.o. female.    ASSESSMENT/PLAN by Problem List:    Assessment & Plan  Summary  IMPRESSION:  - Assessed recurrent UTIs; current infection being treated with Keflex (cephalexin) prescribed by kidney transplant team  - Evaluated tremor; appears consistent with essential tremor rather than Parkinson's or medication-induced dyskinesia  - Considered gastroparesis and acid reflux symptoms, likely exacerbated by Ozempic  - Reviewed diabetes management; noted improvement in A1C from 8.5 to 8.1, anticipating further improvement with Ozempic dose increase  - Noted due for colonoscopy; last one performed in 2019            1. Type 2 diabetes mellitus with stage 3a chronic kidney disease, with long-term current use of insulin  -     Microalbumin/Creatinine Ratio, Urine; Future    2. Hypertension associated with stage 3 chronic kidney disease due to type 2 diabetes mellitus    3. Mixed diabetic hyperlipidemia associated with type 2 diabetes mellitus  -     Lipid Panel; Future; Expected date: 02/17/2025    4. Colon cancer screening  -     Case Request Endoscopy: COLONOSCOPY, SCREENING, LOW RISK PATIENT    5. Gastroesophageal reflux disease without esophagitis  -     lansoprazole (PREVACID) 30 MG capsule; Take 1 capsule (30 mg total) by mouth once daily.  Dispense: 90 capsule; Refill: 1           Follow Up:  3 months  Advance Care Planning     Date: 02/17/2025    ACP Reviewed/No Changes  Voluntary advance care planning discussion had today with patient. Previously completed HCPOA and LW in electronic medical record is current, no changes made.    Reviewed documents on file  A total of <5 min was spent on advance care planning, goals of care discussion, emotional support, formulating and communicating prognosis and exploring burden/benefit of various approaches of treatment. This discussion occurred  on a fully voluntary basis with the verbal consent of the patient and/or family.               Subjective:       HPI    Patient is a/an 65 y.o.  female     For complete problem list, past medical history, surgical history, social history, etc., see appropriate section in the electronic medical record    History of Present Illness    CHIEF COMPLAINT:  Ms. Newell presents today for follow up of recurrent urinary tract infections    URINARY TRACT INFECTION:  She reports current UTI with positive E. coli culture. Symptoms include urinary pressure and urgency with minimal output. She is taking Keflex prescribed by transplant team for 7 days, 4 times daily. She drinks 3-4 bottles (16 oz each) of water daily.    PAIN MANAGEMENT:  Her pain pump with fentanyl was recently removed, resulting in severe withdrawal symptoms including night sweats requiring multiple clothing changes and episodes of disorientation. She currently manages pain with Norco 10/325 mg twice daily.    DIABETES:  She is currently taking Ozempic with increasing doses, progressing from 2.5 mg to 5 mg, and now 1 mg. The medication is effectively lowering her blood sugar, occasionally resulting in glucose readings too low to take insulin. She also notes that the medication is helping to lower her blood pressure.    NEUROLOGICAL:  She uses weighted utensils to help manage tremors and denies family history of tremors.    GERD:  She is experiencing severe acid reflux symptoms and takes Tums multiple times daily for management. She denies being on any prescribed acid reflux medication.      ROS:  General: +night sweats  Gastrointestinal: +heartburn  Genitourinary: +urgency  Neurological: -tremors       Review of Systems   HENT: Negative.     Respiratory: Negative.     Cardiovascular: Negative.    Gastrointestinal: Negative.    Genitourinary: Negative.    Musculoskeletal:  Positive for arthralgias and back pain.   Neurological:  Positive for tremors.  "      Objective     Physical Exam  HENT:      Head: Normocephalic and atraumatic.   Eyes:      General: No scleral icterus.     Conjunctiva/sclera: Conjunctivae normal.   Cardiovascular:      Rate and Rhythm: Normal rate and regular rhythm.      Heart sounds: Murmur heard.      Diastolic murmur is present with a grade of 2/4.      No friction rub.      Comments: There is no peripheral edema.  Pulmonary:      Effort: Pulmonary effort is normal. No respiratory distress.      Breath sounds: Normal breath sounds. No wheezing or rales.   Neurological:      Comments: Bilateral hand tremor appears fairly stable  No bradykinesia here, no cogwheeling or rigidity.  Gait antalgic, no shuffling   Psychiatric:         Mood and Affect: Mood normal.       Vitals:    02/17/25 1511   BP: 118/70   BP Location: Left arm   Patient Position: Sitting   Pulse: 100   SpO2: 98%   Weight: 86.7 kg (191 lb 0.5 oz)   Height: 5' 6" (1.676 m)     Physical Exam                This note was generated with the assistance of ambient listening technology. Verbal consent was obtained by the patient and accompanying visitor(s) for the recording of patient appointment to facilitate this note. I attest to having reviewed and edited the generated note for accuracy, though some syntax or spelling errors may persist. Please contact the author of this note for any clarification.  Parts of the note were also generated with voice recognition software.  Occasionally this software will misinterpreted words or phrases  "

## 2025-02-17 NOTE — TELEPHONE ENCOUNTER
Patient repeated back and voice a understanding of orders.  States this is her 3rd UTI within 1 year.  Does not want to travel to [facility] to see Uro/gyn.  Requesting Urology referral in [place].  RX to [facility] in [facility].  ----- Message from Pharmacist Cailin sent at 2/17/2025 11:02 AM CST -----  Keflex 500 mg every 6 hours x 7 days.     Wilya  ----- Message -----  From: Oracio Hendricks RN  Sent: 2/17/2025  10:20 AM CST  To: [facility] Transplant Pharmacists    Please advise.  ----- Message -----  From: July Vasquez MD  Sent: 2/17/2025   7:13 AM CST  To: [facility]    Cipro 250 mg bid . Await culture sensitivity   ----- Message -----  From: [place], [email or web address] [facility] Interface  Sent: 2/14/2025   2:05 PM CST  To: Maureen Cabral MD

## 2025-02-17 NOTE — TELEPHONE ENCOUNTER
----- Message from July Vasquez MD sent at 2/15/2025 12:30 PM CST -----  The tac level reviewed, no change required.  ----- Message -----  From: [place] [employer] [facility]  Sent: 2/14/2025   7:21 PM CST  To: Maureen Cabral MD

## 2025-02-18 NOTE — ASSESSMENT & PLAN NOTE
Appears to be improving on Ozempic, she will continue to follow with Endocrinology, next A1c in about one month

## 2025-02-18 NOTE — ASSESSMENT & PLAN NOTE
Patient is off of her PPI, not sure why so will resume Prevacid at once a day, reminded on reflux precautions.  Noted that Ozempic could be exacerbating symptoms but overall benefit justify continuing for now

## 2025-02-26 ENCOUNTER — OFFICE VISIT (OUTPATIENT)
Dept: TRANSPLANT | Facility: CLINIC | Age: 65
End: 2025-02-26
Payer: MEDICARE

## 2025-02-26 VITALS
SYSTOLIC BLOOD PRESSURE: 146 MMHG | HEART RATE: 93 BPM | DIASTOLIC BLOOD PRESSURE: 70 MMHG | TEMPERATURE: 97 F | BODY MASS INDEX: 30.22 KG/M2 | OXYGEN SATURATION: 97 % | WEIGHT: 188.06 LBS | HEIGHT: 66 IN | RESPIRATION RATE: 18 BRPM

## 2025-02-26 DIAGNOSIS — Z79.60 LONG-TERM USE OF IMMUNOSUPPRESSANT MEDICATION: Chronic | ICD-10-CM

## 2025-02-26 DIAGNOSIS — Z94.0 KIDNEY REPLACED BY TRANSPLANT: Primary | ICD-10-CM

## 2025-02-26 DIAGNOSIS — N18.31 STAGE 3A CHRONIC KIDNEY DISEASE: ICD-10-CM

## 2025-02-26 DIAGNOSIS — Z91.89 AT RISK FOR OPPORTUNISTIC INFECTIONS: Chronic | ICD-10-CM

## 2025-02-26 DIAGNOSIS — B34.3 PARVOVIRUS B19 INFECTION: ICD-10-CM

## 2025-02-26 PROCEDURE — 99215 OFFICE O/P EST HI 40 MIN: CPT | Mod: S$GLB,,, | Performed by: NURSE PRACTITIONER

## 2025-02-26 PROCEDURE — 3066F NEPHROPATHY DOC TX: CPT | Mod: CPTII,S$GLB,, | Performed by: NURSE PRACTITIONER

## 2025-02-26 PROCEDURE — 3077F SYST BP >= 140 MM HG: CPT | Mod: CPTII,S$GLB,, | Performed by: NURSE PRACTITIONER

## 2025-02-26 PROCEDURE — 1101F PT FALLS ASSESS-DOCD LE1/YR: CPT | Mod: CPTII,S$GLB,, | Performed by: NURSE PRACTITIONER

## 2025-02-26 PROCEDURE — 3078F DIAST BP <80 MM HG: CPT | Mod: CPTII,S$GLB,, | Performed by: NURSE PRACTITIONER

## 2025-02-26 PROCEDURE — 3008F BODY MASS INDEX DOCD: CPT | Mod: CPTII,S$GLB,, | Performed by: NURSE PRACTITIONER

## 2025-02-26 PROCEDURE — 1157F ADVNC CARE PLAN IN RCRD: CPT | Mod: CPTII,S$GLB,, | Performed by: NURSE PRACTITIONER

## 2025-02-26 PROCEDURE — 1159F MED LIST DOCD IN RCRD: CPT | Mod: CPTII,S$GLB,, | Performed by: NURSE PRACTITIONER

## 2025-02-26 PROCEDURE — 99999 PR PBB SHADOW E&M-EST. PATIENT-LVL V: CPT | Mod: PBBFAC,,, | Performed by: NURSE PRACTITIONER

## 2025-02-26 PROCEDURE — 1125F AMNT PAIN NOTED PAIN PRSNT: CPT | Mod: CPTII,S$GLB,, | Performed by: NURSE PRACTITIONER

## 2025-02-26 PROCEDURE — 3288F FALL RISK ASSESSMENT DOCD: CPT | Mod: CPTII,S$GLB,, | Performed by: NURSE PRACTITIONER

## 2025-02-26 NOTE — PROGRESS NOTES
Kidney Post-Transplant Assessment    Referring Physician: Dalton Heaton  Current Nephrologist: Adolfo Steel    ORGAN: LEFT KIDNEY  Donor Type: living  PHS Increased Risk: no  Cold Ischemia: 37 mins  Induction Medications: campath - alemtuzumab (anti-cd52)    Subjective:     CC:  Reassessment of renal allograft function and management of chronic immunosuppression.    HPI:  Ms. Newell is a 65 y.o. year old White female with history of ESRD secondary to DM2/HTN who received a living kidney transplant from a friend on 1/14/19 (campath induction, CMV +/-).  Post transplant with recurrent UTIs and parvovirus. She has CKD stage 3 - GFR 30-59 and her baseline creatinine is between 1.1-1.4. She takes tacrolimus for maintenance immunosuppression.     Seeing urology tomorrow for recurrent UTIs. Had UTI symptoms yesterday but none today.     She follows with general nephrology for CKD care. Although she is now 6 years post transplant she continues to follow with transplant as well due to ongoing medical issues.     Had recent dermatology visit for total body skin exam.     Staying hydrated. Eating OK but is more full in the evenings due to ozempic. No nausea or vomiting. Still dealing with back pain. No fevers or pain over allograft. Tolerating IS without issue.     Current Outpatient Medications   Medication Sig Dispense Refill    acetaminophen (TYLENOL) 650 MG TbSR Take 650 mg by mouth every 8 (eight) hours.      allopurinoL (ZYLOPRIM) 100 MG tablet TAKE 1 TABLET BY MOUTH ONCE DAILY 90 tablet 2    amLODIPine (NORVASC) 5 MG tablet Take 1 tablet by mouth once daily.      azelastine (ASTELIN) 137 mcg (0.1 %) nasal spray 1 spray (137 mcg total) by Nasal route 2 (two) times daily as needed for Rhinitis. 30 mL 3    benzonatate (TESSALON) 100 MG capsule Take 1 capsule (100 mg total) by mouth 3 (three) times daily as needed for Cough. 28 capsule 1    betamethasone dipropionate (BETANATE) 0.05 % lotion Apply thin film to  scalp QHS PRN itch 60 mL 2    blood sugar diagnostic Strp Check glucose 3 (three) times daily. 300 each PRN    clopidogreL (PLAVIX) 75 mg tablet TAKE 1 TABLET BY MOUTH ONCE DAILY 90 tablet 3    colchicine (COLCRYS) 0.6 mg tablet TAKE 1 TABLET (0.6 MG TOTAL) BY MOUTH ONCE DAILY. 90 tablet 3    DEXCOM G7  Misc Dispense 1 1 each 0    DEXCOM G7 SENSOR Amparo CHANGE EVERY 10 DAYS AS DIRECTED BY PHYSICIAN 9 each 1    doxazosin (CARDURA) 2 MG tablet Take 2 mg by mouth once daily.      HYDROcodone-acetaminophen (NORCO)  mg per tablet Take 1 tablet by mouth every 24 hours as needed for Pain.      hydrocortisone 2.5 % cream Thin film to AA onface daily PRN flare; use short term only 28 g 1    hydroxychloroquine (PLAQUENIL) 200 mg tablet TAKE 1 TABLET BY MOUTH TWICE DAILY 60 tablet 3    insulin aspart U-100 (NOVOLOG FLEXPEN U-100 INSULIN) 100 unit/mL (3 mL) InPn pen 12 u AC + correction max TDD 45u 30 mL 4    insulin degludec (TRESIBA FLEXTOUCH U-200) 200 unit/mL (3 mL) insulin pen 24 u qam 2 pen 6    isosorbide mononitrate (IMDUR) 30 MG 24 hr tablet TAKE 1 TABLET BY MOUTH ONCE DAILY 90 tablet 0    ketoconazole (NIZORAL) 2 % cream AAA face bid PRN flare 60 g 3    ketoconazole (NIZORAL) 2 % shampoo Wash hair with medicated shampoo at least 2x/week - let sit on scalp at least 5 minutes prior to rinsing 120 mL 5    lansoprazole (PREVACID) 30 MG capsule Take 1 capsule (30 mg total) by mouth once daily. 90 capsule 1    levothyroxine (SYNTHROID) 75 MCG tablet TAKE 1 TABLET (75 MCG TOTAL) BY MOUTH ONCE DAILY. 90 tablet 3    LIDOcaine (LIDODERM) 5 % 1 patch.      LIDOCAINE VISCOUS 2 % solution       LORazepam (ATIVAN) 1 MG tablet Take 1 mg by mouth every evening.      losartan (COZAAR) 50 MG tablet TAKE 1 TABLET BY MOUTH ONCE DAILY FOR BLOOD PRESSURE 90 tablet 2    metFORMIN (GLUCOPHAGE-XR) 500 MG ER 24hr tablet TAKE 1 TABLET (500 MG TOTAL) BY MOUTH 2 TIMES DAILY WITH MEALS. 180 tablet 1    multivitamin (THERAGRAN) tablet  "Take 1 tablet by mouth once daily.      nitroGLYCERIN (NITROSTAT) 0.4 MG SL tablet Place 1 tablet (0.4 mg total) under the tongue every 5 (five) minutes as needed for Chest pain. 20 tablet 1    ondansetron (ZOFRAN) 4 MG tablet TAKE ONE TABLET BY MOUTH EVERY SIX HOURS 20 tablet 1    pen needle, diabetic (BD LENIN 2ND GEN PEN NEEDLE) 32 gauge x 5/32" Ndle Uses 4 daily 400 each 3    rosuvastatin (CRESTOR) 20 MG tablet Take 20 mg by mouth once daily.      semaglutide (OZEMPIC) 1 mg/dose (4 mg/3 mL) Inject 1 mg into the skin every 7 days. 9 mL 3    sucralfate (CARAFATE) 1 gram tablet Take 1 tablet (1 g total) by mouth 4 (four) times daily before meals and nightly. 120 tablet 3    tacrolimus (PROGRAF) 1 MG Cap Take 1 capsule (1 mg total) by mouth every 12 (twelve) hours. 60 capsule 11    tiZANidine (ZANAFLEX) 4 MG tablet Take 1 tablet by mouth every evening.      vilazodone (VIIBRYD) 40 mg Tab tablet Take 1 tablet (40 mg total) by mouth once daily. 30 tablet 4    VRAYLAR 3 mg Cap Take 3 mg by mouth once daily.      metoprolol tartrate (LOPRESSOR) 25 MG tablet Take 0.5 tablets (12.5 mg total) by mouth 2 (two) times daily. 30 tablet 1    semaglutide (OZEMPIC) 0.25 mg or 0.5 mg (2 mg/3 mL) pen injector 0.5 mg weekly (Patient not taking: Reported on 2/26/2025) 9 mL 3     No current facility-administered medications for this visit.       Past Medical History:   Diagnosis Date    Anemia     Anemia in chronic kidney disease, on chronic dialysis 07/12/2017    Anxiety and depression     Aplastic anemia with parvovirus B19 infection 05/27/2019    Aplastic anemia with parvovirus B19 infection 4/2019 05/27/2019    Arthritis     Asthma     allergic airway    CKD stage III (moderate) 02/18/2019    Colon polyp     Depression     Diabetes mellitus     Diverticulosis     Encounter for blood transfusion     Eosinophilia     ESRD (end stage renal disease)     stage V, due for living donor 9/2018    ESRD on dialysis     GERD (gastroesophageal " "reflux disease)     Gout     Gout     Hyperlipidemia     Hypertension     Hypertriglyceridemia without hypercholesterolemia 06/09/2019    Low back pain     Low HDL (under 40) 06/09/2019    MRSA carrier     Neutropenia, unspecified 05/08/2019    Obesity     Renal disorder     Rotator cuff syndrome--left     Thyroid disease     Ulnar neuropathy of right upper extremity     Uncontrolled type 2 diabetes mellitus with hyperglycemia        Review of Systems   Constitutional:  Negative for activity change and fever.   Eyes:  Negative for visual disturbance.   Respiratory:  Negative for shortness of breath.    Cardiovascular:  Negative for chest pain and leg swelling.   Gastrointestinal:  Negative for constipation, diarrhea and nausea.   Genitourinary:  Negative for difficulty urinating, frequency and hematuria.   Musculoskeletal:  Positive for back pain. Negative for arthralgias and myalgias.   Skin:  Negative for wound.   Allergic/Immunologic: Positive for immunocompromised state.   Neurological:  Positive for tremors. Negative for weakness and numbness.   Psychiatric/Behavioral:  Negative for sleep disturbance. The patient is not nervous/anxious.        Objective:   Blood pressure (!) 146/70, pulse 93, temperature 97.3 °F (36.3 °C), temperature source Temporal, resp. rate 18, height 5' 5.98" (1.676 m), weight 85.3 kg (188 lb 0.8 oz), last menstrual period 02/28/2012, SpO2 97%.body mass index is 30.37 kg/m².    Physical Exam  Vitals and nursing note reviewed.   Constitutional:       Appearance: Normal appearance.   Cardiovascular:      Rate and Rhythm: Normal rate and regular rhythm.      Heart sounds: Normal heart sounds.   Pulmonary:      Effort: Pulmonary effort is normal.      Breath sounds: Normal breath sounds.   Abdominal:      General: There is no distension.   Musculoskeletal:         General: Normal range of motion.   Skin:     General: Skin is warm and dry.   Neurological:      Mental Status: She is alert. "       Labs:  Lab Results   Component Value Date    WBC 6.33 02/14/2025    WBC 6.33 02/14/2025    HGB 13.2 02/14/2025    HGB 13.2 02/14/2025    HCT 40.0 02/14/2025    HCT 40.0 02/14/2025     02/14/2025    K 4.9 02/14/2025     02/14/2025    CO2 26 02/14/2025    BUN 23 02/14/2025    CREATININE 1.2 02/14/2025    EGFRNORACEVR 49.9 (A) 02/14/2025    CALCIUM 10.5 02/14/2025    PHOS 3.1 02/14/2025    MG 1.3 (L) 02/14/2025    ALBUMIN 4.1 02/14/2025    ALBUMIN 4.1 02/14/2025    AST 36 02/14/2025    ALT 36 02/14/2025       Lab Results   Component Value Date    EXTANC 1,240 04/15/2019    EXTWBC 2.0 (L) 04/15/2019    EXTSEGS 62.0 04/15/2019    EXTPLATELETS 141 04/15/2019    EXTHEMOGLOBI 8.7 (L) 04/15/2019    EXTHEMATOCRI 27.6 (L) 04/15/2019    EXTCREATININ 0.84 04/15/2019    EXTSODIUM 140 04/15/2019    EXTPOTASSIUM 4.3 04/15/2019    EXTBUN 13 04/15/2019    EXTCO2 23.0 04/15/2019    EXTCALCIUM 8.6 04/15/2019    EXTGLUCOSE 128 (H) 04/15/2019    EXTALBUMIN 3.4 04/15/2019    EXTAST 37 04/15/2019    EXTALT 35 (H) 04/15/2019    EXTBILITOTAL 0.8 04/15/2019       Labs were reviewed with the patient.    Assessment:     1. Kidney replaced by transplant    2. Stage 3a chronic kidney disease    3. Long-term use of immunosuppressant medication    4. At risk for opportunistic infections    5. History of Parvovirus B19 infection      Plan:   Continue follow up with general nephrology for CKD care  No need for prograf refills today  Stressed the importance of routine cancer screenings (pap, MMG, colonoscopy, skin exams) while on immunosuppression       1. Immunosuppression: Prograf trough 5.6, which is therapeutic (goal 5-7). Continue Prograf 1/1. Off MMF due to history of parvovirus. Will continue to monitor for drug toxicities    2. Allograft Function: CKD IIIa. Continue good oral hydration.   - ESRD secondary to DM2/HTN s/p living kidney transplant from a friend on 1/14/19 (campath induction, CMV +/-).   - Post transplant with  recurrent UTIs and parvovirus.   - baseline creatinine is between 1.1-1.4.   Lab Results   Component Value Date    CREATININE 1.2 02/14/2025       3. Hypertension management:   - advise low salt diet and home BP monitoring      4. Recurrent UTIs   - scheduled to see urology tomorrow    5. DM2   - following with endorinology   - on ozempic   - last A1c 8.1      6. Anemia: stable. No need for intervention   Lab Results   Component Value Date    WBC 6.33 02/14/2025    WBC 6.33 02/14/2025    HGB 13.2 02/14/2025    HGB 13.2 02/14/2025    HCT 40.0 02/14/2025    HCT 40.0 02/14/2025    MCV 94 02/14/2025    MCV 94 02/14/2025     02/14/2025     02/14/2025       7. Proteinuria: continue p/c ratio as per guidelines    - last UPC 0.08    8. History of parvovirus   - remains off MMF    9. Weight education: provided during the clinic visit   Body mass index is 30.37 kg/m².     10. Patient safety education regarding immunosuppression including prophylaxis posttransplant for CMV, PCP            Follow-up:   Annual follow-up with kidney transplant clinic per written guidelines.  Patient also reminded to follow-up with general nephrologist.    Labs: since patient remains at high risk for rejection and drug-related complications that warrant close monitoring, labs will be ordered as follows: continue twice weekly CBC, renal panel, and drug level for first month; then same labs once weekly through 3rd month post-transplant.  Urine for UA and protein/creatinine ratio monthly.  Serum BK - PCR at 1-, 3-, 6-, 9-, 12-, 18-, 24-, 36-, 48-, and 60 months post-transplant.  Hepatic panel at 1-, 2-, 3-, 6-, 9-, 12-, 18-, 24-, and 36- months post-transplant.    Erika Spence NP

## 2025-02-26 NOTE — LETTER
February 26, 2025        Adolfo Steel  [address]  [address]  [place] [address]  Phone: [phone]  Fax: [phone]             [facility]st [facility]  [address]  [facility] [address]  Phone: [phone]   Patient: Andra Newell   MR Number: [id number]   YOB: 1960   Date of Visit: 2/26/2025       Dear Dr. Adolfo Steel    Thank you for referring Andra Newell to me for evaluation. Attached you will find relevant portions of my assessment and plan of care.    If you have questions, please do not hesitate to call me. I look forward to following Andra Newell along with you.    Sincerely,    Erika Spence, FIDENCIO    Enclosure    If you would like to receive this communication electronically, please contact [email or web address] or [phone] to request [email or web address] access.    [email or web address] is a tool which provides read-only access to select patient information with whom you have a relationship. Its easy to use and provides real time access to review your patients record including encounter summaries, notes, results, and demographic information.    If you feel you have received this communication in error or would no longer like to receive these types of communications, please e-mail [email or web address]

## 2025-02-27 ENCOUNTER — OFFICE VISIT (OUTPATIENT)
Dept: UROLOGY | Facility: CLINIC | Age: 65
End: 2025-02-27
Payer: MEDICARE

## 2025-02-27 VITALS — WEIGHT: 188.06 LBS | BODY MASS INDEX: 30.22 KG/M2 | HEIGHT: 66 IN

## 2025-02-27 DIAGNOSIS — Z94.0 KIDNEY REPLACED BY TRANSPLANT: ICD-10-CM

## 2025-02-27 DIAGNOSIS — N39.0 RECURRENT UTI: ICD-10-CM

## 2025-02-27 LAB — POC RESIDUAL URINE VOLUME: 20 ML (ref 0–100)

## 2025-02-27 PROCEDURE — 99999 PR PBB SHADOW E&M-EST. PATIENT-LVL V: CPT | Mod: PBBFAC,,,

## 2025-02-27 RX ORDER — ESTRADIOL 0.1 MG/G
CREAM VAGINAL
Qty: 42.5 G | Refills: 3 | Status: SHIPPED | OUTPATIENT
Start: 2025-02-28

## 2025-02-27 NOTE — PROGRESS NOTES
[facility] Note  Staff: Addie Esquivel FNP-C    PCP: MD Roderick    Chief Complaint: Recurrent UTIs    Subjective:        HPI: Andra Newell is a 65 y.o. female NEW PATIENT presents today for evaluation of recurrent UTIs. She is accompanied by a female friend. She underwent a kidney transplant in 2019. She states since then, she has been experiencing frequent UTIs. She also complains of urgency and pressure. She states she last finished a course of abx 1 day ago. She denies a history of kidney stones. She denies constipation.     She has been seen by UroGYN and underwent cystoscopy which showed cystocele. She was on estrogen vaginal cream but has not taken this medication in years. We discussed restarting this as a preventative for UTIs.    She also was on oxybutynin 10mg for urgency and urge incontinence but states she has not taken a bladder medication in years. She does not wish to take a OAB medication at this time. She prefers to have further evaluation done first.     She denies hematuria, fever, flank pain, and difficulty urinating.      Questions asked pt during office visit today:  Urgency:Yes , incontinence with urgency? Yes - at times;   DysuriaNo  Gross HematuriaNo  History of UTI: Yes     History of Kidney Stones?:  no    Constipation issues?:  no    PVR by bladder scan performed by MA today:  20 mL    REVIEW OF SYSTEMS:  Review of Systems   Constitutional: Negative.  Negative for chills and fever.   Gastrointestinal: Negative.  Negative for abdominal pain, constipation, diarrhea, nausea and vomiting.   Genitourinary:  Positive for urgency. Negative for dysuria, flank pain, frequency and hematuria.   Musculoskeletal: Negative.  Negative for back pain.       PMHx:  Past Medical History:   Diagnosis Date    Anemia     Anemia in chronic kidney disease, on chronic dialysis 07/12/2017    Anxiety and depression     Aplastic anemia with parvovirus B19 infection 05/27/2019     "Aplastic anemia with parvovirus B19 infection 4/2019 05/27/2019    Arthritis     Asthma     allergic airway    CKD stage III (moderate) 02/18/2019    Colon polyp     Depression     Diabetes mellitus     Diverticulosis     Encounter for blood transfusion     Eosinophilia     ESRD (end stage renal disease)     stage V, due for living donor 9/2018    ESRD on dialysis     GERD (gastroesophageal reflux disease)     Gout     Gout     Hyperlipidemia     Hypertension     Hypertriglyceridemia without hypercholesterolemia 06/09/2019    Low back pain     Low HDL (under 40) 06/09/2019    MRSA carrier     Neutropenia, unspecified 05/08/2019    Obesity     Renal disorder     Rotator cuff syndrome--left     Thyroid disease     Ulnar neuropathy of right upper extremity     Uncontrolled type 2 diabetes mellitus with hyperglycemia        PSHx:  Past Surgical History:   Procedure Laterality Date    ACHILLES TENDON SURGERY Left 05/2015    ANGIOGRAM, CORONARY, WITH LEFT HEART CATHETERIZATION N/A 7/10/2023    Procedure: Angiogram, Coronary, with Left Heart Cath;  Surgeon: Ajay Hunter MD;  [facility] CATH/EP LAB;  Service: Cardiology;  Laterality: N/A;    BACK SURGERY      CERVICAL SPINE SURGERY  2021    cervical gate placed by Dr. Vera- screws,plate, and "gate"    CHOLECYSTECTOMY      COLONOSCOPY N/A 09/06/2017    Procedure: COLONOSCOPY;  Surgeon: Marisol Bryson MD;  Location: [facility] ENDO;  Service: Endoscopy;  Laterality: N/A; REPEAT IN 3 YEARS FOR SURVEILLANCE    COLONOSCOPY N/A 05/09/2019    Procedure: COLONOSCOPY;  Surgeon: Fady Ewing MD;  Location: [facility] ([address] [place] [address]);  Service: Endoscopy;  Laterality: N/A;    CORONARY ANGIOGRAPHY  08/31/2022    Procedure: ANGIOGRAM, CORONARY ARTERY;  Surgeon: Christoph Dang MD;  Location: [facility] CATH;  Service: Cardiology;;    DIALYSIS FISTULA CREATION  12/2017    ESOPHAGOGASTRODUODENOSCOPY N/A 05/09/2019    Procedure: EGD (ESOPHAGOGASTRODUODENOSCOPY);  Surgeon: Fady Ewing, " MD;  Location: [facility] (2ND FLR);  Service: Endoscopy;  Laterality: N/A;    ESOPHAGOGASTRODUODENOSCOPY N/A 06/10/2021    Procedure: EGD (ESOPHAGOGASTRODUODENOSCOPY);  Surgeon: Marisol Bryson MD;  Location: [facility] ENDO;  Service: Endoscopy;  Laterality: N/A;    ESOPHAGOGASTRODUODENOSCOPY N/A 5/31/2023    Procedure: EGD (ESOPHAGOGASTRODUODENOSCOPY);  Surgeon: Marisol Bryson MD;  Location: [facility];  Service: Endoscopy;  Laterality: N/A;    FRACTURE SURGERY  1990    Ankle knee    HEMORRHOID SURGERY      INJECTION OF ANESTHETIC AGENT AROUND MEDIAL BRANCH NERVES INNERVATING LUMBAR FACET JOINT Right 09/25/2019    Procedure: Block-nerve-medial branch-lumbar;  Surgeon: Yonny Ferrer MD;  Location: [facility];  Service: Pain Management;  Laterality: Right;  L2, 3, 4,5     INJECTION OF ANESTHETIC AGENT AROUND MEDIAL BRANCH NERVES INNERVATING LUMBAR FACET JOINT Right 10/16/2019    Procedure: Block-nerve-medial branch-lumbar;  Surgeon: Yonny Ferrer MD;  Location: [facility];  Service: Pain Management;  Laterality: Right;  L2, 3, 4,5     JOINT REPLACEMENT      left    KIDNEY TRANSPLANT N/A 01/14/2019    Procedure: TRANSPLANT, KIDNEY;  Surgeon: Roland Salazar MD;  Location: [facility] OR 2ND FLR;  Service: Transplant;  Laterality: N/A;    KNEE SURGERY      RADIOFREQUENCY ABLATION OF LUMBAR MEDIAL BRANCH NERVE AT SINGLE LEVEL Right 10/29/2019    Procedure: Radiofrequency Ablation, Nerve, Spinal, Lumbar, Medial Branch, 1 Level;  Surgeon: Yonny Ferrer MD;  Location: [facility];  Service: Pain Management;  Laterality: Right;  L2, 3, 4, 5  burned at 80 degrees C X 60 seconds each site X 2    SHOULDER ARTHROSCOPY      SHOULDER SURGERY      SPINE SURGERY  2020    Pain pump in back    UPPER GASTROINTESTINAL ENDOSCOPY  ~2013    Dr. Kirt Butts Hx:   malignancies: No , gyn malignancies: Yes - sister ovarian cancer   kidney stones: No     Soc Hx:  Lives in [place]    Allergies:  Hydromorphone, Morphine, Torsemide, and  Codeine    Medications: reviewed     Objective:   There were no vitals filed for this visit.    Physical Exam  Constitutional:       Appearance: Normal appearance.   Pulmonary:      Effort: Pulmonary effort is normal.   Abdominal:      General: There is no distension.      Palpations: Abdomen is soft.      Tenderness: There is no abdominal tenderness.   Musculoskeletal:         General: Normal range of motion.      Cervical back: Normal range of motion.   Skin:     General: Skin is warm.   Neurological:      Mental Status: She is oriented to person, place, and time.   Psychiatric:         Mood and Affect: Mood normal.         Behavior: Behavior normal.         LABS REVIEW:  UA today:   pt urinated prior to OV    Assessment:       1. Kidney replaced by transplant    2. Recurrent UTI          Plan:      Estrace vaginal cream prescribed- place 1 fingertip of cream first at urethral opening and then external vagina every other day  CT ABD/Pelvis without contrast ordered and scheduled  Cystoscopy ordered and scheduled with pelvic exam    F/u per plan    [facility]: active    GHULAM Kerr-C

## 2025-02-28 ENCOUNTER — TELEPHONE (OUTPATIENT)
Dept: CARDIOLOGY | Facility: CLINIC | Age: 65
End: 2025-02-28
Payer: MEDICARE

## 2025-03-05 ENCOUNTER — TELEPHONE (OUTPATIENT)
Dept: GASTROENTEROLOGY | Facility: CLINIC | Age: 65
End: 2025-03-05
Payer: MEDICARE

## 2025-03-07 ENCOUNTER — PATIENT MESSAGE (OUTPATIENT)
Dept: UROLOGY | Facility: CLINIC | Age: 65
End: 2025-03-07
Payer: MEDICARE

## 2025-03-07 DIAGNOSIS — N39.0 RECURRENT UTI: Primary | ICD-10-CM

## 2025-03-13 ENCOUNTER — PATIENT MESSAGE (OUTPATIENT)
Dept: UROLOGY | Facility: CLINIC | Age: 65
End: 2025-03-13
Payer: MEDICARE

## 2025-03-13 ENCOUNTER — LAB VISIT (OUTPATIENT)
Dept: LAB | Facility: HOSPITAL | Age: 65
End: 2025-03-13
Attending: NURSE PRACTITIONER
Payer: MEDICARE

## 2025-03-13 DIAGNOSIS — E78.2 MIXED DIABETIC HYPERLIPIDEMIA ASSOCIATED WITH TYPE 2 DIABETES MELLITUS: ICD-10-CM

## 2025-03-13 DIAGNOSIS — Z79.4 TYPE 2 DIABETES MELLITUS WITH STAGE 3A CHRONIC KIDNEY DISEASE, WITH LONG-TERM CURRENT USE OF INSULIN: ICD-10-CM

## 2025-03-13 DIAGNOSIS — E11.69 MIXED DIABETIC HYPERLIPIDEMIA ASSOCIATED WITH TYPE 2 DIABETES MELLITUS: ICD-10-CM

## 2025-03-13 DIAGNOSIS — Z94.0 KIDNEY REPLACED BY TRANSPLANT: ICD-10-CM

## 2025-03-13 DIAGNOSIS — N18.31 TYPE 2 DIABETES MELLITUS WITH STAGE 3A CHRONIC KIDNEY DISEASE, WITH LONG-TERM CURRENT USE OF INSULIN: ICD-10-CM

## 2025-03-13 DIAGNOSIS — N39.0 RECURRENT UTI: ICD-10-CM

## 2025-03-13 DIAGNOSIS — E11.22 TYPE 2 DIABETES MELLITUS WITH STAGE 3A CHRONIC KIDNEY DISEASE, WITH LONG-TERM CURRENT USE OF INSULIN: ICD-10-CM

## 2025-03-13 LAB
ALBUMIN SERPL BCP-MCNC: 3.8 G/DL (ref 3.5–5.2)
ALBUMIN/CREAT UR: 36.7 UG/MG (ref 0–30)
ANION GAP SERPL CALC-SCNC: 9 MMOL/L (ref 8–16)
BACTERIA #/AREA URNS HPF: ABNORMAL /HPF
BILIRUB UR QL STRIP: ABNORMAL
BUN SERPL-MCNC: 14 MG/DL (ref 8–23)
CALCIUM SERPL-MCNC: 10.4 MG/DL (ref 8.7–10.5)
CHLORIDE SERPL-SCNC: 104 MMOL/L (ref 95–110)
CHOLEST SERPL-MCNC: 96 MG/DL (ref 120–199)
CHOLEST/HDLC SERPL: 3.7 {RATIO} (ref 2–5)
CLARITY UR: ABNORMAL
CO2 SERPL-SCNC: 29 MMOL/L (ref 23–29)
COLOR UR: YELLOW
CREAT SERPL-MCNC: 1.3 MG/DL (ref 0.5–1.4)
CREAT UR-MCNC: 313 MG/DL (ref 15–325)
CREAT UR-MCNC: 313 MG/DL (ref 15–325)
EST. GFR  (NO RACE VARIABLE): 45.1 ML/MIN/1.73 M^2
ESTIMATED AVG GLUCOSE: 146 MG/DL (ref 68–131)
GLUCOSE SERPL-MCNC: 159 MG/DL (ref 70–110)
GLUCOSE UR QL STRIP: NEGATIVE
HBA1C MFR BLD: 6.7 % (ref 4–5.6)
HDLC SERPL-MCNC: 26 MG/DL (ref 40–75)
HDLC SERPL: 27.1 % (ref 20–50)
HGB UR QL STRIP: ABNORMAL
HYALINE CASTS #/AREA URNS LPF: 0 /LPF
KETONES UR QL STRIP: ABNORMAL
LDLC SERPL CALC-MCNC: 43.8 MG/DL (ref 63–159)
LEUKOCYTE ESTERASE UR QL STRIP: ABNORMAL
MICROALBUMIN UR DL<=1MG/L-MCNC: 115 UG/ML
MICROSCOPIC COMMENT: ABNORMAL
NITRITE UR QL STRIP: NEGATIVE
NONHDLC SERPL-MCNC: 70 MG/DL
PH UR STRIP: 6 [PH] (ref 5–8)
PHOSPHATE SERPL-MCNC: 3.8 MG/DL (ref 2.7–4.5)
POTASSIUM SERPL-SCNC: 4.9 MMOL/L (ref 3.5–5.1)
PROT UR QL STRIP: ABNORMAL
PROT UR-MCNC: 45 MG/DL (ref 0–15)
PROT/CREAT UR: 0.14 MG/G{CREAT} (ref 0–0.2)
PTH-INTACT SERPL-MCNC: 38.1 PG/ML (ref 9–77)
RBC #/AREA URNS HPF: 4 /HPF (ref 0–4)
SODIUM SERPL-SCNC: 142 MMOL/L (ref 136–145)
SP GR UR STRIP: >=1.03 (ref 1–1.03)
SQUAMOUS #/AREA URNS HPF: 6 /HPF
TRIGL SERPL-MCNC: 131 MG/DL (ref 30–150)
URN SPEC COLLECT METH UR: ABNORMAL
WBC #/AREA URNS HPF: >100 /HPF (ref 0–5)

## 2025-03-13 PROCEDURE — 83036 HEMOGLOBIN GLYCOSYLATED A1C: CPT | Performed by: NURSE PRACTITIONER

## 2025-03-13 PROCEDURE — 80197 ASSAY OF TACROLIMUS: CPT | Performed by: INTERNAL MEDICINE

## 2025-03-13 PROCEDURE — 36415 COLL VENOUS BLD VENIPUNCTURE: CPT | Mod: PO | Performed by: INTERNAL MEDICINE

## 2025-03-13 PROCEDURE — 80069 RENAL FUNCTION PANEL: CPT | Performed by: INTERNAL MEDICINE

## 2025-03-13 PROCEDURE — 87088 URINE BACTERIA CULTURE: CPT

## 2025-03-13 PROCEDURE — 81000 URINALYSIS NONAUTO W/SCOPE: CPT | Mod: PO | Performed by: INTERNAL MEDICINE

## 2025-03-13 PROCEDURE — 87086 URINE CULTURE/COLONY COUNT: CPT

## 2025-03-13 PROCEDURE — 82043 UR ALBUMIN QUANTITATIVE: CPT | Performed by: FAMILY MEDICINE

## 2025-03-13 PROCEDURE — 82570 ASSAY OF URINE CREATININE: CPT | Performed by: INTERNAL MEDICINE

## 2025-03-13 PROCEDURE — 87186 SC STD MICRODIL/AGAR DIL: CPT

## 2025-03-13 PROCEDURE — 80061 LIPID PANEL: CPT | Performed by: FAMILY MEDICINE

## 2025-03-13 PROCEDURE — 83970 ASSAY OF PARATHORMONE: CPT | Performed by: INTERNAL MEDICINE

## 2025-03-14 ENCOUNTER — RESULTS FOLLOW-UP (OUTPATIENT)
Dept: PRIMARY CARE CLINIC | Facility: CLINIC | Age: 65
End: 2025-03-14

## 2025-03-14 LAB — TACROLIMUS BLD-MCNC: 6.9 NG/ML (ref 5–15)

## 2025-03-14 RX ORDER — CEPHALEXIN 500 MG/1
500 CAPSULE ORAL EVERY 6 HOURS
Qty: 28 CAPSULE | Refills: 0 | OUTPATIENT
Start: 2025-03-14 | End: 2025-03-21

## 2025-03-17 ENCOUNTER — RESULTS FOLLOW-UP (OUTPATIENT)
Dept: UROLOGY | Facility: CLINIC | Age: 65
End: 2025-03-17

## 2025-03-17 ENCOUNTER — PATIENT MESSAGE (OUTPATIENT)
Dept: UROLOGY | Facility: CLINIC | Age: 65
End: 2025-03-17
Payer: MEDICARE

## 2025-03-17 ENCOUNTER — TELEPHONE (OUTPATIENT)
Dept: UROLOGY | Facility: CLINIC | Age: 65
End: 2025-03-17
Payer: MEDICARE

## 2025-03-17 DIAGNOSIS — N30.00 ACUTE CYSTITIS WITHOUT HEMATURIA: Primary | ICD-10-CM

## 2025-03-17 LAB — BACTERIA UR CULT: ABNORMAL

## 2025-03-17 RX ORDER — NITROFURANTOIN 25; 75 MG/1; MG/1
100 CAPSULE ORAL 2 TIMES DAILY
Qty: 28 CAPSULE | Refills: 0 | Status: SHIPPED | OUTPATIENT
Start: 2025-03-17 | End: 2025-03-31

## 2025-03-17 NOTE — TELEPHONE ENCOUNTER
----- Message from Addie Esquivel NP sent at 3/17/2025  8:23 AM CDT -----  Macrobid sent in, waiting for final sensitivity  ----- Message -----  From: [facility]  Sent: 3/15/2025   8:33 AM CDT  To: Addie Esquivel NP

## 2025-03-17 NOTE — TELEPHONE ENCOUNTER
----- Message from Karyna sent at 3/17/2025  1:46 PM CDT -----  Type: Needs Medical AdviceWho Called:  ptBest Call Back Number: [phone] Information: She has some questions re:Cystology.  Please call back to advise, thank you!

## 2025-03-24 DIAGNOSIS — Z00.00 ENCOUNTER FOR MEDICARE ANNUAL WELLNESS EXAM: ICD-10-CM

## 2025-03-25 ENCOUNTER — PATIENT MESSAGE (OUTPATIENT)
Dept: UROLOGY | Facility: CLINIC | Age: 65
End: 2025-03-25
Payer: MEDICARE

## 2025-03-25 DIAGNOSIS — F41.9 ANXIETY DUE TO INVASIVE PROCEDURE: Primary | ICD-10-CM

## 2025-03-26 ENCOUNTER — TELEPHONE (OUTPATIENT)
Dept: GASTROENTEROLOGY | Facility: CLINIC | Age: 65
End: 2025-03-26
Payer: MEDICARE

## 2025-03-26 RX ORDER — DIAZEPAM 10 MG/1
10 TABLET ORAL ONCE
Qty: 1 TABLET | Refills: 0 | Status: SHIPPED | OUTPATIENT
Start: 2025-03-26 | End: 2025-03-26

## 2025-03-26 NOTE — TELEPHONE ENCOUNTER
Attempted to reach pt to schedule scope from case request. No answer, unable to leave a message. Another CIQUAL message sent

## 2025-03-27 RX ORDER — ISOSORBIDE MONONITRATE 30 MG/1
30 TABLET, EXTENDED RELEASE ORAL
Qty: 90 TABLET | Refills: 0 | Status: SHIPPED | OUTPATIENT
Start: 2025-03-27

## 2025-03-28 ENCOUNTER — TELEPHONE (OUTPATIENT)
Dept: PRIMARY CARE CLINIC | Facility: CLINIC | Age: 65
End: 2025-03-28
Payer: MEDICARE

## 2025-03-28 NOTE — TELEPHONE ENCOUNTER
----- Message from Shelley sent at 3/28/2025  1:28 PM CDT -----  Contact: [phone]  Symptoms: Nausea But No Vomiting, WeaknessOutcome: Talk to a nurse or provider within 15 minutes.Reason: Getting worse

## 2025-03-28 NOTE — TELEPHONE ENCOUNTER
Called patient and she said she refuses to see Cristy, so will wait to see Dr Vaughn on Wednesday instead. I told patient that if symptoms persist or worsen, that she can go to UC, but patient said she will wait til Wednesday.

## 2025-03-29 DIAGNOSIS — N18.31 TYPE 2 DIABETES MELLITUS WITH STAGE 3A CHRONIC KIDNEY DISEASE, WITH LONG-TERM CURRENT USE OF INSULIN: ICD-10-CM

## 2025-03-29 DIAGNOSIS — E11.22 TYPE 2 DIABETES MELLITUS WITH STAGE 3A CHRONIC KIDNEY DISEASE, WITH LONG-TERM CURRENT USE OF INSULIN: ICD-10-CM

## 2025-03-29 DIAGNOSIS — Z79.4 TYPE 2 DIABETES MELLITUS WITH STAGE 3A CHRONIC KIDNEY DISEASE, WITH LONG-TERM CURRENT USE OF INSULIN: ICD-10-CM

## 2025-03-31 RX ORDER — METFORMIN HYDROCHLORIDE 500 MG/1
TABLET, EXTENDED RELEASE ORAL
Qty: 180 TABLET | Refills: 2 | Status: SHIPPED | OUTPATIENT
Start: 2025-03-31

## 2025-04-01 ENCOUNTER — OFFICE VISIT (OUTPATIENT)
Dept: ENDOCRINOLOGY | Facility: CLINIC | Age: 65
End: 2025-04-01
Payer: MEDICARE

## 2025-04-01 VITALS
HEIGHT: 66 IN | SYSTOLIC BLOOD PRESSURE: 122 MMHG | BODY MASS INDEX: 29.42 KG/M2 | HEART RATE: 90 BPM | DIASTOLIC BLOOD PRESSURE: 72 MMHG | WEIGHT: 183.06 LBS

## 2025-04-01 DIAGNOSIS — E11.69 MIXED DIABETIC HYPERLIPIDEMIA ASSOCIATED WITH TYPE 2 DIABETES MELLITUS: Chronic | ICD-10-CM

## 2025-04-01 DIAGNOSIS — E11.22 TYPE 2 DIABETES MELLITUS WITH STAGE 3A CHRONIC KIDNEY DISEASE, WITH LONG-TERM CURRENT USE OF INSULIN: Primary | ICD-10-CM

## 2025-04-01 DIAGNOSIS — I12.9 HYPERTENSION ASSOCIATED WITH STAGE 3 CHRONIC KIDNEY DISEASE DUE TO TYPE 2 DIABETES MELLITUS: Chronic | ICD-10-CM

## 2025-04-01 DIAGNOSIS — Z94.0 KIDNEY TRANSPLANT RECIPIENT: Chronic | ICD-10-CM

## 2025-04-01 DIAGNOSIS — Z79.4 TYPE 2 DIABETES MELLITUS WITH STAGE 3A CHRONIC KIDNEY DISEASE, WITH LONG-TERM CURRENT USE OF INSULIN: Primary | ICD-10-CM

## 2025-04-01 DIAGNOSIS — N18.31 TYPE 2 DIABETES MELLITUS WITH STAGE 3A CHRONIC KIDNEY DISEASE, WITH LONG-TERM CURRENT USE OF INSULIN: Primary | ICD-10-CM

## 2025-04-01 DIAGNOSIS — E11.22 HYPERTENSION ASSOCIATED WITH STAGE 3 CHRONIC KIDNEY DISEASE DUE TO TYPE 2 DIABETES MELLITUS: Chronic | ICD-10-CM

## 2025-04-01 DIAGNOSIS — E78.2 MIXED DIABETIC HYPERLIPIDEMIA ASSOCIATED WITH TYPE 2 DIABETES MELLITUS: Chronic | ICD-10-CM

## 2025-04-01 DIAGNOSIS — N18.30 HYPERTENSION ASSOCIATED WITH STAGE 3 CHRONIC KIDNEY DISEASE DUE TO TYPE 2 DIABETES MELLITUS: Chronic | ICD-10-CM

## 2025-04-01 DIAGNOSIS — I25.10 CORONARY ARTERY DISEASE INVOLVING NATIVE CORONARY ARTERY OF NATIVE HEART WITHOUT ANGINA PECTORIS: ICD-10-CM

## 2025-04-01 PROCEDURE — 99999 PR PBB SHADOW E&M-EST. PATIENT-LVL IV: CPT | Mod: PBBFAC,,, | Performed by: NURSE PRACTITIONER

## 2025-04-01 NOTE — PROGRESS NOTES
CC: This 65 y.o. female  presents for management of diabetes  and chronic conditions pending review including HTN, HLP, ESRD s/p kidney txp 01/14/2019, hypothyroidism, vitamin d deficiency, obesity      HPI: She  was diagnosed with T2DM in 2005. Has never been hospitalized r/t DM.  Family hx of DM: grandmother    Since last visit started having a lot of nausea again, taking zofran often   In ER Friday w fatigue, dehydration   Wearing Dexcom G7 sensor- see download in Media tab;  0% Very High  10% High  90% In Range  0% Low  <1% Very Low  Rare pp excursions  BG look terrific     Diet: Eats 1 -2 Meals a day, snacks- cheese and crackers  B- cereal  Skips lunch   S- mostly home  Exercise: none   CURRENT DM MEDS:   metformin xr 500 mg bid, Ozempic 1 mg weekly (Wednesday)  Standards of Care:  Eye exam: Dr. Ortega 5/7/2024- will need cataract sx on both bilaterally     Regarding hypothyroidism   Fmh mom  Takes LT4 with all her other meds   No hoarseness, voice changes, dysphagia, no compressive symptoms, or head/neck exposure to XRT.   No personal or FH of thyroid cancer or MEN syndrome.   Not taking biotin.   + tremors of the hands  + intolerance to the heat    No  hair loss       ROS: Gen: Appetite good, weight 20  lbs  Eyes: wears glasses  Resp: no SOB  Cardiac: no  palpitations ,  No chest pain-   GI: + nausea, vomiting x 1 episode, no  diarrhea     /GYN: 1+ nocturia, no burning or pain.   MS/Neuro: + left leg pain and tingling- r/t back issues, speech clear    PE:  GENERAL: Well developed, well nourished.  PSYCH: AAOx3, appropriate mood and affect, pleasant expression, conversant, appears relaxed, well groomed.   EYES: Conjunctiva, corneas clear  NECK: Supple, trachea midline,   SKIN:   no acanthosis nigracans.  FOOT EXAMINATION:  4/1/2025  No foot deformity, corns or callus formation,  nails in good condition and well trimmed, no interspace maceration or ulceration noted.  Decreased hair growth present over  "toes/feet.  Protective sensation intact with 10 gram monofilament.  +2 dorsalis pedis and posterior pulses noted.     Personally reviewed Past Medical, Surgical, Social History.    /72   Pulse 90   Ht 5' 6" (1.676 m)   Wt 83 kg (183 lb 1.5 oz)   LMP 02/28/2012   BMI 29.55 kg/m²      Personally reviewed the below labs:      Chemistry        Component Value Date/Time     03/13/2025 1030    K 4.9 03/13/2025 1030     03/13/2025 1030    CO2 29 03/13/2025 1030    BUN 14 03/13/2025 1030    CREATININE 1.3 03/13/2025 1030     (H) 03/13/2025 1030        Component Value Date/Time    CALCIUM 10.4 03/13/2025 1030    ALKPHOS 113 02/14/2025 1200    AST 36 02/14/2025 1200    ALT 36 02/14/2025 1200    BILITOT 0.7 02/14/2025 1200    ESTGFRAFRICA >60.0 06/09/2022 0954    EGFRNONAA 59.7 (A) 06/09/2022 0954            Lab Results   Component Value Date    TSH 0.499 05/01/2024       Recent Labs   Lab 03/13/25  1030   LDL Cholesterol 43.8 L   HDL 26 L   Cholesterol 96 L        No results found. However, due to the size of the patient record, not all encounters were searched. Please check Results Review for a complete set of results.    No results found. However, due to the size of the patient record, not all encounters were searched. Please check Results Review for a complete set of results.      Lab Results   Component Value Date    MICALBCREAT 36.7 (H) 03/13/2025       Hemoglobin A1C   Date Value Ref Range Status   03/13/2025 6.7 (H) 4.0 - 5.6 % Final     Comment:     ADA Screening Guidelines:  5.7-6.4%  Consistent with prediabetes  >or=6.5%  Consistent with diabetes    High levels of fetal hemoglobin interfere with the HbA1C  assay. Heterozygous hemoglobin variants (HbS, HgC, etc)do  not significantly interfere with this assay.   However, presence of multiple variants may affect accuracy.     11/27/2024 8.1 (H) 4.0 - 5.6 % Final     Comment:     ADA Screening Guidelines:  5.7-6.4%  Consistent with " prediabetes  >or=6.5%  Consistent with diabetes    High levels of fetal hemoglobin interfere with the HbA1C  assay. Heterozygous hemoglobin variants (HbS, HgC, etc)do  not significantly interfere with this assay.   However, presence of multiple variants may affect accuracy.     08/28/2024 8.5 (H) 4.0 - 5.6 % Final     Comment:     ADA Screening Guidelines:  5.7-6.4%  Consistent with prediabetes  >or=6.5%  Consistent with diabetes    High levels of fetal hemoglobin interfere with the HbA1C  assay. Heterozygous hemoglobin variants (HbS, HgC, etc)do  not significantly interfere with this assay.   However, presence of multiple variants may affect accuracy.          ASSESSMENT and PLAN:      1. T2DM with  CKD 3-     Hold ozempic x 2 weeks, if nausea resolved resumed ozempic at 36 clicks (0.5 mg weekly)  Continue metformin and Dexcom G7     2. HTN - controlled, continue meds as previously prescribed and monitor.      3. HLP - on statin therapy     4. S/p kidney txp- followed by [facility]     5.  Immunosuppression - agents may increase bg readings     6. Hypothyroidism - takes with all other meds, clinically euthyroid, Last TSH WNL      F/u in 3 months w A1C

## 2025-04-11 ENCOUNTER — TELEPHONE (OUTPATIENT)
Dept: PRIMARY CARE CLINIC | Facility: CLINIC | Age: 65
End: 2025-04-11
Payer: MEDICARE

## 2025-04-11 NOTE — TELEPHONE ENCOUNTER
Pt called back and refused a HFU appt at this time.  She reports that she will call back if she feels the need to be seen.

## 2025-04-14 ENCOUNTER — PATIENT MESSAGE (OUTPATIENT)
Dept: ENDOCRINOLOGY | Facility: CLINIC | Age: 65
End: 2025-04-14
Payer: MEDICARE

## 2025-04-15 ENCOUNTER — TELEPHONE (OUTPATIENT)
Dept: ENDOCRINOLOGY | Facility: CLINIC | Age: 65
End: 2025-04-15
Payer: MEDICARE

## 2025-04-15 ENCOUNTER — OFFICE VISIT (OUTPATIENT)
Dept: CARDIOLOGY | Facility: CLINIC | Age: 65
End: 2025-04-15
Payer: MEDICARE

## 2025-04-15 VITALS
DIASTOLIC BLOOD PRESSURE: 68 MMHG | WEIGHT: 184.75 LBS | SYSTOLIC BLOOD PRESSURE: 117 MMHG | HEART RATE: 59 BPM | HEIGHT: 66 IN | BODY MASS INDEX: 29.69 KG/M2

## 2025-04-15 DIAGNOSIS — Z79.4 TYPE 2 DIABETES MELLITUS WITH STAGE 3A CHRONIC KIDNEY DISEASE, WITH LONG-TERM CURRENT USE OF INSULIN: Primary | ICD-10-CM

## 2025-04-15 DIAGNOSIS — N18.31 STAGE 3A CHRONIC KIDNEY DISEASE: Chronic | ICD-10-CM

## 2025-04-15 DIAGNOSIS — Z79.4 TYPE 2 DIABETES MELLITUS WITH STAGE 3A CHRONIC KIDNEY DISEASE, WITH LONG-TERM CURRENT USE OF INSULIN: ICD-10-CM

## 2025-04-15 DIAGNOSIS — N18.30 HYPERTENSION ASSOCIATED WITH STAGE 3 CHRONIC KIDNEY DISEASE DUE TO TYPE 2 DIABETES MELLITUS: Chronic | ICD-10-CM

## 2025-04-15 DIAGNOSIS — E11.22 TYPE 2 DIABETES MELLITUS WITH STAGE 3A CHRONIC KIDNEY DISEASE, WITH LONG-TERM CURRENT USE OF INSULIN: Primary | ICD-10-CM

## 2025-04-15 DIAGNOSIS — E66.9 OBESITY (BMI 30-39.9): Chronic | ICD-10-CM

## 2025-04-15 DIAGNOSIS — N18.31 TYPE 2 DIABETES MELLITUS WITH STAGE 3A CHRONIC KIDNEY DISEASE, WITH LONG-TERM CURRENT USE OF INSULIN: ICD-10-CM

## 2025-04-15 DIAGNOSIS — I12.9 HYPERTENSION ASSOCIATED WITH STAGE 3 CHRONIC KIDNEY DISEASE DUE TO TYPE 2 DIABETES MELLITUS: Chronic | ICD-10-CM

## 2025-04-15 DIAGNOSIS — E11.22 HYPERTENSION ASSOCIATED WITH STAGE 3 CHRONIC KIDNEY DISEASE DUE TO TYPE 2 DIABETES MELLITUS: Chronic | ICD-10-CM

## 2025-04-15 DIAGNOSIS — I35.1 AORTIC VALVE INSUFFICIENCY, ETIOLOGY OF CARDIAC VALVE DISEASE UNSPECIFIED: Chronic | ICD-10-CM

## 2025-04-15 DIAGNOSIS — I25.10 CORONARY ARTERY DISEASE INVOLVING NATIVE CORONARY ARTERY OF NATIVE HEART WITHOUT ANGINA PECTORIS: Primary | ICD-10-CM

## 2025-04-15 DIAGNOSIS — N18.31 TYPE 2 DIABETES MELLITUS WITH STAGE 3A CHRONIC KIDNEY DISEASE, WITH LONG-TERM CURRENT USE OF INSULIN: Primary | ICD-10-CM

## 2025-04-15 DIAGNOSIS — E11.22 TYPE 2 DIABETES MELLITUS WITH STAGE 3A CHRONIC KIDNEY DISEASE, WITH LONG-TERM CURRENT USE OF INSULIN: ICD-10-CM

## 2025-04-15 DIAGNOSIS — E78.2 MIXED HYPERLIPIDEMIA: ICD-10-CM

## 2025-04-15 PROBLEM — I25.119 ATHEROSCLEROSIS OF NATIVE CORONARY ARTERY OF NATIVE HEART WITH ANGINA PECTORIS: Status: RESOLVED | Noted: 2023-07-10 | Resolved: 2025-04-15

## 2025-04-15 PROCEDURE — 3078F DIAST BP <80 MM HG: CPT | Mod: CPTII,S$GLB,,

## 2025-04-15 PROCEDURE — 1126F AMNT PAIN NOTED NONE PRSNT: CPT | Mod: CPTII,S$GLB,,

## 2025-04-15 PROCEDURE — 1157F ADVNC CARE PLAN IN RCRD: CPT | Mod: CPTII,S$GLB,,

## 2025-04-15 PROCEDURE — 1100F PTFALLS ASSESS-DOCD GE2>/YR: CPT | Mod: CPTII,S$GLB,,

## 2025-04-15 PROCEDURE — 3060F POS MICROALBUMINURIA REV: CPT | Mod: CPTII,S$GLB,,

## 2025-04-15 PROCEDURE — 3074F SYST BP LT 130 MM HG: CPT | Mod: CPTII,S$GLB,,

## 2025-04-15 PROCEDURE — 99999 PR PBB SHADOW E&M-EST. PATIENT-LVL IV: CPT | Mod: PBBFAC,,,

## 2025-04-15 PROCEDURE — 99214 OFFICE O/P EST MOD 30 MIN: CPT | Mod: S$GLB,,,

## 2025-04-15 PROCEDURE — 3008F BODY MASS INDEX DOCD: CPT | Mod: CPTII,S$GLB,,

## 2025-04-15 PROCEDURE — 3044F HG A1C LEVEL LT 7.0%: CPT | Mod: CPTII,S$GLB,,

## 2025-04-15 PROCEDURE — 4010F ACE/ARB THERAPY RXD/TAKEN: CPT | Mod: CPTII,S$GLB,,

## 2025-04-15 PROCEDURE — 3066F NEPHROPATHY DOC TX: CPT | Mod: CPTII,S$GLB,,

## 2025-04-15 PROCEDURE — 3288F FALL RISK ASSESSMENT DOCD: CPT | Mod: CPTII,S$GLB,,

## 2025-04-15 PROCEDURE — 1159F MED LIST DOCD IN RCRD: CPT | Mod: CPTII,S$GLB,,

## 2025-04-15 RX ORDER — TIRZEPATIDE 2.5 MG/.5ML
2.5 INJECTION, SOLUTION SUBCUTANEOUS
Qty: 4 PEN | Refills: 0 | Status: SHIPPED | OUTPATIENT
Start: 2025-04-15

## 2025-04-15 NOTE — PROGRESS NOTES
[facility]  Date: 4/15/25    Patient: Andra Newell, 1960, [id number]  Primary Care Provider: Fady Vaughn MD     Chief Complaint: Follow up     Subjective:       Andra Newell is a 65 y.o. female who presents for follow up. They follow with Dr. Werner.    CBC, CMP, lipid panel stable. At last office visit, patient with atypical chest pain for which cardiac PET stress ordered and never completed.    Since then, patient reports progressively worsening dyspnea over the past couple of months, occurring when walking short distances. She also experiences unilateral chest pain at night, with improvement from Tums, suggesting possible acid reflux. The chest pain occurs independently of food intake. Recent ER visit for weakness, dizziness, N/V, and dehydration- negative cardiac workup at that time. Denies palpitations, dizziness, syncope, orthopnea, PND, edema.    Focused Past History includes:  Coronary artery disease s/p PCI  [facility] 7/2023: 50% stenosed distal RCA, 65% stenosed distal LAD, patent diagonal stent   Aortic valve insufficiency  TTE 7/2023: LVEF 60%, mild-mod AR, trace MR, trace TR  Essential hypertension   Mixed hyperlipidemia  Stage 3a chronic kidney disease  Type 2 diabetes mellitus   Obesity     Current Outpatient Medications   Medication Sig    acetaminophen (TYLENOL) 650 MG TbSR Take 650 mg by mouth every 8 (eight) hours.    allopurinoL (ZYLOPRIM) 100 MG tablet TAKE 1 TABLET BY MOUTH ONCE DAILY    amLODIPine (NORVASC) 5 MG tablet Take 1 tablet by mouth once daily.    benzonatate (TESSALON) 100 MG capsule Take 1 capsule (100 mg total) by mouth 3 (three) times daily as needed for Cough.    betamethasone dipropionate (BETANATE) 0.05 % lotion Apply thin film to scalp QHS PRN itch    blood sugar diagnostic Strp Check glucose 3 (three) times daily.    clopidogreL (PLAVIX) 75 mg tablet TAKE 1 TABLET BY MOUTH ONCE DAILY    colchicine (COLCRYS) 0.6 mg tablet  TAKE 1 TABLET (0.6 MG TOTAL) BY MOUTH ONCE DAILY.    DEXCOM G7  [facility] Dispense 1    DEXCOM G7 SENSOR Amparo CHANGE EVERY 10 DAYS AS DIRECTED BY PHYSICIAN    diazePAM (VALIUM) 10 MG Tab Take 1 tablet (10 mg total) by mouth once. for 1 dose    HYDROcodone-acetaminophen (NORCO)  mg per tablet Take 1 tablet by mouth every 24 hours as needed for Pain.    hydrocortisone 2.5 % cream Thin film to AA onface daily PRN flare; use short term only    hydroxychloroquine (PLAQUENIL) 200 mg tablet TAKE 1 TABLET BY MOUTH TWICE DAILY    isosorbide mononitrate (IMDUR) 30 MG 24 hr tablet TAKE 1 TABLET BY MOUTH ONCE DAILY    ketoconazole (NIZORAL) 2 % cream AAA face bid PRN flare    ketoconazole (NIZORAL) 2 % shampoo Wash hair with medicated shampoo at least 2x/week - let sit on scalp at least 5 minutes prior to rinsing    lansoprazole (PREVACID) 30 MG capsule Take 1 capsule (30 mg total) by mouth once daily.    levothyroxine (SYNTHROID) 75 MCG tablet TAKE 1 TABLET (75 MCG TOTAL) BY MOUTH ONCE DAILY.    LIDOcaine (LIDODERM) 5 % 1 patch.    LIDOCAINE VISCOUS 2 % solution     LORazepam (ATIVAN) 1 MG tablet Take 1 mg by mouth every evening.    losartan (COZAAR) 50 MG tablet TAKE 1 TABLET BY MOUTH ONCE DAILY FOR BLOOD PRESSURE    metFORMIN (GLUCOPHAGE-XR) 500 MG ER 24hr tablet TAKE 1 TABLET (500 MG TOTAL) BY MOUTH 2 TIMES DAILY WITH MEALS.    metoprolol tartrate (LOPRESSOR) 25 MG tablet Take 0.5 tablets (12.5 mg total) by mouth 2 (two) times daily.    multivitamin (THERAGRAN) tablet Take 1 tablet by mouth once daily.    nitroGLYCERIN (NITROSTAT) 0.4 MG SL tablet Place 1 tablet (0.4 mg total) under the tongue every 5 (five) minutes as needed for Chest pain.    ondansetron (ZOFRAN) 4 MG tablet TAKE ONE TABLET BY MOUTH EVERY SIX HOURS    ondansetron (ZOFRAN-ODT) 4 MG TbDL Take 1 tablet (4 mg total) by mouth every 6 (six) hours as needed (nausea).    rosuvastatin (CRESTOR) 20 MG tablet Take 20 mg by mouth once daily.    sucralfate  (CARAFATE) 1 gram tablet Take 1 tablet (1 g total) by mouth 4 (four) times daily before meals and nightly.    tacrolimus (PROGRAF) 1 MG Cap Take 1 capsule (1 mg total) by mouth every 12 (twelve) hours.    tirzepatide (MOUNJARO) 2.5 mg/0.5 mL PnIj Inject 2.5 mg into the skin every 7 days.    tiZANidine (ZANAFLEX) 4 MG tablet Take 1 tablet by mouth every evening.    vilazodone (VIIBRYD) 40 mg Tab tablet Take 1 tablet (40 mg total) by mouth once daily.    VRAYLAR 3 mg Cap Take 3 mg by mouth once daily.    azelastine (ASTELIN) 137 mcg (0.1 %) nasal spray 1 spray (137 mcg total) by Nasal route 2 (two) times daily as needed for Rhinitis. (Patient not taking: Reported on 4/15/2025)    doxazosin (CARDURA) 2 MG tablet Take 2 mg by mouth once daily.    estradioL (ESTRACE) 0.01 % (0.1 mg/gram) vaginal cream Place 1 fingertip of cream first at urethral opening and then external vagina at least every other day. Please do not use plastic applicator (Patient not taking: Reported on 4/15/2025)     No current facility-administered medications for this visit.            Objective       Review of Systems  Constitutional: negative for fevers, night sweats, and weight loss  Eyes: negative for visual disturbance, diplopia  Respiratory: negative for cough, hemoptysis, sputum, and wheezing  Cardiovascular: see HPI  Gastrointestinal: negative for abdominal pain, bright red blood per rectum, change in bowel habits, dysphagia, melena, and reflux symptoms  Genitourinary:negative for dysuria, frequency, and hematuria  Hematologic/lymphatic: negative for bleeding, easy bruising, and lymphadenopathy  Musculoskeletal:negative for arthralgias, back pain, and myalgias  Neurological: negative for gait problems, paresthesia, speech problems, vertigo, and weakness  Behavioral/Psych: negative for excessive alcohol consumption, illegal drug usage, and sleep disturbance    -------------------------------------    Anemia    Anemia in chronic kidney  "disease, on chronic dialysis    Anxiety and depression    Aplastic anemia with parvovirus B19 infection    Aplastic anemia with parvovirus B19 infection 4/2019    Arthritis    Asthma    allergic airway    CKD stage III (moderate)    Colon polyp    Depression    Diabetes mellitus    Diverticulosis    Encounter for blood transfusion    Eosinophilia    ESRD (end stage renal disease)    stage V, due for living donor 9/2018    ESRD on dialysis    GERD (gastroesophageal reflux disease)    Gout    Gout    Hyperlipidemia    Hypertension    Hypertriglyceridemia without hypercholesterolemia    Low back pain    Low HDL (under 40)    MRSA carrier    Neutropenia, unspecified    Obesity    Renal disorder    Rotator cuff syndrome--left    Thyroid disease    Ulnar neuropathy of right upper extremity    Uncontrolled type 2 diabetes mellitus with hyperglycemia     ----------------------------    Achilles tendon surgery    Angiogram, coronary, with left heart catheterization    Procedure: Angiogram, Coronary, with Left Heart Cath;  Surgeon: Ajay Hunter MD;  Location: [facility] CATH/EP LAB;  Service: Cardiology;  Laterality: N/A;    Back surgery    Cervical spine surgery    cervical gate placed by Dr. Vera- screws,plate, and "gate"    Cholecystectomy    Colonoscopy    Procedure: COLONOSCOPY;  Surgeon: Marisol Bryson MD;  Location: [facility];  Service: Endoscopy;  Laterality: N/A; REPEAT IN 3 YEARS FOR SURVEILLANCE    Colonoscopy    Procedure: COLONOSCOPY;  Surgeon: Fady Ewing MD;  [facility]  Service: Endoscopy;  Laterality: N/A;    Coronary angiography    Procedure: ANGIOGRAM, CORONARY ARTERY;  Surgeon: Christoph Dang MD;  Location: [facility] CATH;  Service: Cardiology;;    Dialysis fistula creation    Esophagogastroduodenoscopy    Procedure: EGD (ESOPHAGOGASTRODUODENOSCOPY);  Surgeon: Fady Ewing MD;  [facility]  Service: Endoscopy;  Laterality: N/A;    Esophagogastroduodenoscopy "    Procedure: EGD (ESOPHAGOGASTRODUODENOSCOPY);  Surgeon: Marisol Bryson MD;  Location: [facility] ENDO;  Service: Endoscopy;  Laterality: N/A;    Esophagogastroduodenoscopy    Procedure: EGD (ESOPHAGOGASTRODUODENOSCOPY);  Surgeon: Marisol Bryson MD;  Location: [facility] ENDO;  Service: Endoscopy;  Laterality: N/A;    Fracture surgery    Ankle knee    Hemorrhoid surgery    Injection of anesthetic agent around medial branch nerves innervating lumbar facet joint    Procedure: Block-nerve-medial branch-lumbar;  Surgeon: Yonny Ferrer MD;  Location: [facility];  Service: Pain Management;  Laterality: Right;  L2, 3, 4,5     Injection of anesthetic agent around medial branch nerves innervating lumbar facet joint    Procedure: Block-nerve-medial branch-lumbar;  Surgeon: Yonny Ferrer MD;  Location: [facility];  Service: Pain Management;  Laterality: Right;  L2, 3, 4,5     Joint replacement    left    Kidney transplant    Procedure: TRANSPLANT, KIDNEY;  Surgeon: Roland Salazar MD;  Location: [address];  Service: Transplant;  Laterality: N/A;    Knee surgery    Radiofrequency ablation of lumbar medial branch nerve at single level    Procedure: Radiofrequency Ablation, Nerve, Spinal, Lumbar, Medial Branch, 1 Level;  Surgeon: Yonny Ferrer MD;  Location: [facility];  Service: Pain Management;  Laterality: Right;  L2, 3, 4, 5  burned at 80 degrees C X 60 seconds each site X 2    Shoulder arthroscopy    Shoulder surgery    Spine surgery    Pain pump in back    Upper gastrointestinal endoscopy     Kirt        Family History   Problem Relation Name Age of Onset    Diabetes Mother age 78     Alzheimer's disease Mother age 78     Thyroid disease Mother age 78     Hyperlipidemia Mother age 78     Arthritis Mother age 78     Depression Mother age 78     Heart disease Mother age 78     Heart disease Father age unk     Stroke Father age unk     Diabetes Sister 2 HALF     Lupus Sister 2 HALF     Ovarian cancer Sister 2 HALF     Mental illness  "Sister 2 HALF     COPD Maternal Aunt 5,4 decd     Diabetes Maternal Aunt 5,4 decd     Heart disease Maternal Aunt 5,4 decd     Hypertension Maternal Aunt 5,4 decd     No Known Problems Maternal Uncle 4,3 decd     No Known Problems Paternal Aunt 3-4     No Known Problems Paternal Uncle 4     Diabetes Maternal Grandmother Tami linares     Hypertension Maternal Grandmother Tami linares     Kidney disease Maternal Grandmother Tami linares     No Known Problems Maternal Grandfather      No Known Problems Paternal Grandmother      No Known Problems Paternal Grandfather      Heart disease Brother Jax linares     No Known Problems Son 2     No Known Problems Other      Colon cancer Neg Hx      Colon polyps Neg Hx      Crohn's disease Neg Hx      Ulcerative colitis Neg Hx      Celiac disease Neg Hx      Esophageal cancer Neg Hx      Stomach cancer Neg Hx      Breast cancer Neg Hx      BRCA 1/2 Neg Hx       Social History[1]    Physical Exam  /68   Pulse (!) 59   Ht 5' 6" (1.676 m)   Wt 83.8 kg (184 lb 11.9 oz)   LMP 02/28/2012   BMI 29.82 kg/m²   Body surface area is 1.98 meters squared.  Body mass index is 29.82 kg/m².    General appearance: alert, appears stated age, cooperative, and no distress  Head: Normocephalic, without obvious abnormality, atraumatic  Neck: no carotid bruit, no JVD, and supple, symmetrical, trachea midline  Lungs: clear to auscultation bilaterally  Heart: regular rate and rhythm; S1, S2 normal, no click, rub or gallop; murmur present   Abdomen: soft, non-tender, no distended  Extremities: extremities atraumatic, no pitting edema  Skin: warm, no cyanosis, no pathologic ecchymosis in exposed portions  Neurologic: Grossly normal. A&O x3      Lab Review   Lab Results   Component Value Date    WBC 6.33 02/14/2025    WBC 6.33 02/14/2025    HGB 13.2 02/14/2025    HGB 13.2 02/14/2025    HCT 40.0 02/14/2025    HCT 40.0 02/14/2025    MCV 94 02/14/2025    MCV 94 02/14/2025     " 02/14/2025     02/14/2025         BMP  Lab Results   Component Value Date     03/13/2025    K 4.9 03/13/2025     03/13/2025    CO2 29 03/13/2025    BUN 14 03/13/2025    CREATININE 1.3 03/13/2025    CALCIUM 10.4 03/13/2025    ANIONGAP 9 03/13/2025    ESTGFRAFRICA >60.0 06/09/2022    EGFRNONAA 59.7 (A) 06/09/2022       Lab Results   Component Value Date    LABPROT 10.9 06/09/2022    ALBUMIN 3.8 03/13/2025       Lab Results   Component Value Date    ALT 36 02/14/2025    AST 36 02/14/2025    ALKPHOS 113 02/14/2025    BILITOT 0.7 02/14/2025       Lab Results   Component Value Date    TSH 0.499 05/01/2024       Lab Results   Component Value Date    CHOL 96 (L) 03/13/2025    CHOL 129 05/09/2024    CHOL 81 (L) 04/26/2023     Lab Results   Component Value Date    HDL 26 (L) 03/13/2025    HDL 36 (L) 05/09/2024    HDL 27 (L) 04/26/2023     Lab Results   Component Value Date    LDLCALC 43.8 (L) 03/13/2025    LDLCALC 58.8 (L) 05/09/2024    LDLCALC 27.6 (L) 04/26/2023     Lab Results   Component Value Date    TRIG 131 03/13/2025    TRIG 171 (H) 05/09/2024    TRIG 132 04/26/2023     Lab Results   Component Value Date    CHOLHDL 27.1 03/13/2025    CHOLHDL 27.9 05/09/2024    CHOLHDL 33.3 04/26/2023                Assessment & Plan:     This is a 65 y.o. female with PMHx of CAD s/p PCI, AR, HTN, HLS, CKD, type 2 DM, history of pericarditis. Reports progressive BOYER for the past few months with stable atypical chest pain.     1. Coronary artery disease s/p PCI  - Worsening anginal equivalents   - Continue rosuvastatin 20 mg qd   - Continue clopidogrel 75 mg qd  - Continue isosorbide mononitrate 30 mg qd   - Cardiac PET stress for further evaluation    2. Aortic valve insufficiency  - TTE 7/2023: LVEF 60%, mild-mod AR  - Progressive BOYER  - TTE prior to next appointment for surveillance and evaluation of symptoms    3. Essential hypertension   - Well controlled  - Continue metoprolol tartrate 12.5 mg BID  - Continue  losartan 50 mg qd   - Continue amlodipine 5 mg qd   - Continue doxazosin 2 mg qd     4. Mixed hyperlipidemia  - LDL 43.8, HDL 26  - Continue rosuvastatin 20 mg qd     5. Stage 3a chronic kidney disease  - Cr stable at 1.2  - Continue routine labs and management per primary care     6. Type 2 diabetes mellitus   - A1c controlled at 6.7  - Continue routine labs and management per primary care     7. Obesity   - Weight stable  - Low level/low impact aerobic exercise 5x's/wk recommended.   - Heart healthy diet and risk factor modification discussed with patient.     Emphasized the importance of modifying lifestyle related risk factors including exercise, diet most resembling a Mediterranean diet.    Please follow up in 3 months or sooner if needed.      Albania Benoit PA-C  [facility]  Office: [phone]                 [1]   Social History  Tobacco Use    Smoking status: Never    Smokeless tobacco: Never   Substance Use Topics    Alcohol use: No     Alcohol/week: 0.0 standard drinks of alcohol    Drug use: No

## 2025-04-17 ENCOUNTER — PATIENT MESSAGE (OUTPATIENT)
Dept: CARDIOLOGY | Facility: CLINIC | Age: 65
End: 2025-04-17
Payer: MEDICARE

## 2025-04-21 ENCOUNTER — PATIENT MESSAGE (OUTPATIENT)
Dept: UROLOGY | Facility: CLINIC | Age: 65
End: 2025-04-21
Payer: MEDICARE

## 2025-04-21 ENCOUNTER — PATIENT MESSAGE (OUTPATIENT)
Dept: CARDIOLOGY | Facility: CLINIC | Age: 65
End: 2025-04-21
Payer: MEDICARE

## 2025-04-25 ENCOUNTER — TELEPHONE (OUTPATIENT)
Dept: CARDIOLOGY | Facility: HOSPITAL | Age: 65
End: 2025-04-25
Payer: MEDICARE

## 2025-04-29 ENCOUNTER — HOSPITAL ENCOUNTER (OUTPATIENT)
Dept: CARDIOLOGY | Facility: HOSPITAL | Age: 65
Discharge: HOME | End: 2025-04-29
Payer: MEDICARE

## 2025-04-29 VITALS
HEIGHT: 66 IN | BODY MASS INDEX: 29.57 KG/M2 | HEART RATE: 112 BPM | SYSTOLIC BLOOD PRESSURE: 183 MMHG | WEIGHT: 184 LBS | DIASTOLIC BLOOD PRESSURE: 96 MMHG

## 2025-04-29 DIAGNOSIS — I25.10 CORONARY ARTERY DISEASE INVOLVING NATIVE CORONARY ARTERY OF NATIVE HEART WITHOUT ANGINA PECTORIS: ICD-10-CM

## 2025-04-29 LAB
CFR FLOW - ANTERIOR: 1.9
CFR FLOW - INFERIOR: 2.33
CFR FLOW - LATERAL: 2.03
CFR FLOW - MAX: 2.63
CFR FLOW - MIN: 1.67
CFR FLOW - SEPTAL: 2.07
CFR FLOW - WHOLE HEART: 2.08
CV STRESS BASE HR: 103 BPM
DIASTOLIC BLOOD PRESSURE: 84 MMHG
EJECTION FRACTION- HIGH: 59 %
END DIASTOLIC INDEX-HIGH: 155 ML/M2
END DIASTOLIC INDEX-LOW: 91 ML/M2
END SYSTOLIC INDEX-HIGH: 78 ML/M2
END SYSTOLIC INDEX-LOW: 40 ML/M2
NUC REST DIASTOLIC VOLUME INDEX: 58
NUC REST EJECTION FRACTION: 71
NUC REST SYSTOLIC VOLUME INDEX: 17
NUC STRESS DIASTOLIC VOLUME INDEX: 65
NUC STRESS EJECTION FRACTION: 77 %
NUC STRESS SYSTOLIC VOLUME INDEX: 15
OHS CV CPX 85 PERCENT MAX PREDICTED HEART RATE MALE: 130
OHS CV CPX MAX PREDICTED HEART RATE: 153
OHS CV CPX PATIENT IS FEMALE: 1
OHS CV CPX PATIENT IS MALE: 0
OHS CV CPX PEAK DIASTOLIC BLOOD PRESSURE: 61 MMHG
OHS CV CPX PEAK HEAR RATE: 118 BPM
OHS CV CPX PEAK RATE PRESSURE PRODUCT: NORMAL
OHS CV CPX PEAK SYSTOLIC BLOOD PRESSURE: 143 MMHG
OHS CV CPX PERCENT MAX PREDICTED HEART RATE ACHIEVED: 80
OHS CV CPX RATE PRESSURE PRODUCT PRESENTING: NORMAL
OHS CV INITIAL DOSE: 24.7 MCG/KG/MIN
OHS CV MODERATELY REDUCED FLOW CAPACITY: 0 %
OHS CV NO ISCHEMIA MILDLY REDUCED FLOW CAPACTY: 1 %
OHS CV NO ISCHEMIA MINIMALLY REDUCED FLOW CAPACITY: 5 %
OHS CV NORMAL FLOW CAPACITY COMPARABLE TO HEALTHY YOUNG VOLUNTEERS: 94 %
OHS CV PEAK DOSE: 24.8 MCG/KG/MIN
OHS CV PET ID: 8708
OHS CV SEVERELY REDUCED FLOW CAPACITY: 0 %
OHS CV TOTAL EXAM DLP: 454.71 MGY-CM
REST FLOW - ANTERIOR: 1.38 CC/MIN/G
REST FLOW - INFERIOR: 1.05 CC/MIN/G
REST FLOW - LATERAL: 1.47 CC/MIN/G
REST FLOW - MAX: 1.73 CC/MIN/G
REST FLOW - MIN: 0.8 CC/MIN/G
REST FLOW - SEPTAL: 1.15 CC/MIN/G
REST FLOW - WHOLE HEART: 1.26 CC/MIN/G
RETIRED EF AND QEF - SEE NOTES: 47 %
STRESS FLOW - ANTERIOR: 2.61 CC/MIN/G
STRESS FLOW - INFERIOR: 2.42 CC/MIN/G
STRESS FLOW - LATERAL: 2.97 CC/MIN/G
STRESS FLOW - MAX: 3.37 CC/MIN/G
STRESS FLOW - MIN: 1.63 CC/MIN/G
STRESS FLOW - SEPTAL: 2.37 CC/MIN/G
STRESS FLOW - WHOLE HEART: 2.59 CC/MIN/G
SYSTOLIC BLOOD PRESSURE: 133 MMHG

## 2025-04-29 PROCEDURE — 78434 AQMBF PET REST & RX STRESS: CPT | Mod: 26,,, | Performed by: INTERNAL MEDICINE

## 2025-04-29 PROCEDURE — 93018 CV STRESS TEST I&R ONLY: CPT | Mod: ,,, | Performed by: INTERNAL MEDICINE

## 2025-04-29 PROCEDURE — A9555 RB82 RUBIDIUM: HCPCS

## 2025-04-29 PROCEDURE — 63600175 PHARM REV CODE 636 W HCPCS

## 2025-04-29 PROCEDURE — 78431 MYOCRD IMG PET RST&STRS CT: CPT | Mod: 26,,, | Performed by: INTERNAL MEDICINE

## 2025-04-29 PROCEDURE — 78431 MYOCRD IMG PET RST&STRS CT: CPT

## 2025-04-29 PROCEDURE — 93016 CV STRESS TEST SUPVJ ONLY: CPT | Mod: ,,, | Performed by: INTERNAL MEDICINE

## 2025-04-29 RX ORDER — REGADENOSON 0.08 MG/ML
0.4 INJECTION, SOLUTION INTRAVENOUS
Status: COMPLETED | OUTPATIENT
Start: 2025-04-29 | End: 2025-04-29

## 2025-04-29 RX ORDER — AMINOPHYLLINE 25 MG/ML
75 INJECTION, SOLUTION INTRAVENOUS
Status: COMPLETED | OUTPATIENT
Start: 2025-04-29 | End: 2025-04-29

## 2025-04-29 RX ADMIN — RUBIDIUM CHLORIDE RB-82 24.7 MILLICURIE: 150 INJECTION, SOLUTION INTRAVENOUS at 08:04

## 2025-04-29 RX ADMIN — AMINOPHYLLINE 75 MG: 25 INJECTION, SOLUTION INTRAVENOUS at 08:04

## 2025-04-29 RX ADMIN — RUBIDIUM CHLORIDE RB-82 24.8 MILLICURIE: 150 INJECTION, SOLUTION INTRAVENOUS at 08:04

## 2025-04-29 RX ADMIN — REGADENOSON 0.4 MG: 0.08 INJECTION, SOLUTION INTRAVENOUS at 08:04

## 2025-04-30 ENCOUNTER — HOSPITAL ENCOUNTER (OUTPATIENT)
Dept: CARDIOLOGY | Facility: HOSPITAL | Age: 65
Discharge: HOME | End: 2025-04-30
Payer: MEDICARE

## 2025-04-30 ENCOUNTER — RESULTS FOLLOW-UP (OUTPATIENT)
Dept: CARDIOLOGY | Facility: CLINIC | Age: 65
End: 2025-04-30

## 2025-04-30 VITALS — HEIGHT: 66 IN | BODY MASS INDEX: 29.57 KG/M2 | WEIGHT: 184 LBS

## 2025-04-30 DIAGNOSIS — E11.22 HYPERTENSION ASSOCIATED WITH STAGE 3 CHRONIC KIDNEY DISEASE DUE TO TYPE 2 DIABETES MELLITUS: Chronic | ICD-10-CM

## 2025-04-30 DIAGNOSIS — I35.1 AORTIC VALVE INSUFFICIENCY, ETIOLOGY OF CARDIAC VALVE DISEASE UNSPECIFIED: Chronic | ICD-10-CM

## 2025-04-30 DIAGNOSIS — I12.9 HYPERTENSION ASSOCIATED WITH STAGE 3 CHRONIC KIDNEY DISEASE DUE TO TYPE 2 DIABETES MELLITUS: Chronic | ICD-10-CM

## 2025-04-30 DIAGNOSIS — N18.30 HYPERTENSION ASSOCIATED WITH STAGE 3 CHRONIC KIDNEY DISEASE DUE TO TYPE 2 DIABETES MELLITUS: Chronic | ICD-10-CM

## 2025-04-30 DIAGNOSIS — I25.10 CORONARY ARTERY DISEASE INVOLVING NATIVE CORONARY ARTERY OF NATIVE HEART WITHOUT ANGINA PECTORIS: ICD-10-CM

## 2025-04-30 LAB
ASCENDING AORTA: 3.4 CM
AV INDEX (PROSTH): 0.35
AV MEAN GRADIENT: 29 MMHG
AV PEAK GRADIENT: 46 MMHG
AV REGURGITATION PRESSURE HALF TIME: 378 MS
AV VALVE AREA BY VELOCITY RATIO: 1.1 CM²
AV VALVE AREA: 1.3 CM²
AV VELOCITY RATIO: 0.29
BSA FOR ECHO PROCEDURE: 1.97 M2
CV ECHO LV RWT: 0.67 CM
DOP CALC AO PEAK VEL: 3.4 M/S
DOP CALC AO VTI: 61.7 CM
DOP CALC LVOT AREA: 3.8 CM2
DOP CALC LVOT DIAMETER: 2.2 CM
DOP CALC LVOT PEAK VEL: 1 M/S
DOP CALC LVOT STROKE VOLUME: 82.1 CM3
DOP CALCLVOT PEAK VEL VTI: 21.6 CM
E WAVE DECELERATION TIME: 264 MSEC
E/A RATIO: 0.66
E/E' RATIO: 7 M/S
ECHO LV POSTERIOR WALL: 1.3 CM (ref 0.6–1.1)
EJECTION FRACTION: 65 %
FRACTIONAL SHORTENING: 30.8 % (ref 28–44)
INTERVENTRICULAR SEPTUM: 1.3 CM (ref 0.6–1.1)
IVRT: 129 MSEC
LEFT ATRIUM AREA SYSTOLIC (APICAL 2 CHAMBER): 16.75 CM2
LEFT ATRIUM AREA SYSTOLIC (APICAL 4 CHAMBER): 16.16 CM2
LEFT ATRIUM SIZE: 3.7 CM
LEFT ATRIUM VOLUME INDEX MOD: 22 ML/M2
LEFT ATRIUM VOLUME MOD: 42 ML
LEFT INTERNAL DIMENSION IN SYSTOLE: 2.7 CM (ref 2.1–4)
LEFT VENTRICLE DIASTOLIC VOLUME INDEX: 33.16 ML/M2
LEFT VENTRICLE DIASTOLIC VOLUME: 64 ML
LEFT VENTRICLE END SYSTOLIC VOLUME APICAL 2 CHAMBER: 43.62 ML
LEFT VENTRICLE END SYSTOLIC VOLUME APICAL 4 CHAMBER: 36.83 ML
LEFT VENTRICLE MASS INDEX: 93.1 G/M2
LEFT VENTRICLE SYSTOLIC VOLUME INDEX: 14.5 ML/M2
LEFT VENTRICLE SYSTOLIC VOLUME: 28 ML
LEFT VENTRICULAR INTERNAL DIMENSION IN DIASTOLE: 3.9 CM (ref 3.5–6)
LEFT VENTRICULAR MASS: 179.7 G
LV LATERAL E/E' RATIO: 6.1 M/S
LV SEPTAL E/E' RATIO: 8.2 M/S
LVED V (TEICH): 63.76 ML
LVES V (TEICH): 28.12 ML
LVOT MG: 2.69 MMHG
LVOT MV: 0.79 CM/S
MV PEAK A VEL: 0.74 M/S
MV PEAK E VEL: 0.49 M/S
MV STENOSIS PRESSURE HALF TIME: 76.46 MS
MV VALVE AREA P 1/2 METHOD: 2.88 CM2
OHS CV RV/LV RATIO: 0.85 CM
PISA AR MAX VEL: 4.51 M/S
PISA TR MAX VEL: 2.2 M/S
PULM VEIN S/D RATIO: 1.48
PV PEAK D VEL: 0.52 M/S
PV PEAK S VEL: 0.77 M/S
RA PRESSURE ESTIMATED: 3 MMHG
RA VOL SYS: 30.2 ML
RIGHT ATRIAL AREA: 13.1 CM2
RIGHT ATRIUM VOLUME AREA LENGTH APICAL 4 CHAMBER: 26.97 ML
RIGHT VENTRICLE DIASTOLIC BASEL DIMENSION: 3.3 CM
RIGHT VENTRICLE DIASTOLIC LENGTH: 7.8 CM
RIGHT VENTRICLE DIASTOLIC MID DIMENSION: 2.4 CM
RIGHT VENTRICULAR END-DIASTOLIC DIMENSION: 3.31 CM
RIGHT VENTRICULAR LENGTH IN DIASTOLE (APICAL 4-CHAMBER VIEW): 7.84 CM
RV MID DIAMA: 2.35 CM
RV TB RVSP: 5 MMHG
RV TISSUE DOPPLER FREE WALL SYSTOLIC VELOCITY 1 (APICAL 4 CHAMBER VIEW): 11.44 CM/S
SINUS: 3.2 CM
STJ: 3.3 CM
TDI LATERAL: 0.08 M/S
TDI SEPTAL: 0.06 M/S
TDI: 0.07 M/S
TR MAX PG: 20 MMHG
TRICUSPID ANNULAR PLANE SYSTOLIC EXCURSION: 1.4 CM
TV REST PULMONARY ARTERY PRESSURE: 22 MMHG
Z-SCORE OF LEFT VENTRICULAR DIMENSION IN END DIASTOLE: -3.37
Z-SCORE OF LEFT VENTRICULAR DIMENSION IN END SYSTOLE: -1.74

## 2025-04-30 PROCEDURE — 93306 TTE W/DOPPLER COMPLETE: CPT | Mod: 26,,, | Performed by: INTERNAL MEDICINE

## 2025-04-30 PROCEDURE — 93306 TTE W/DOPPLER COMPLETE: CPT | Mod: PO

## 2025-05-01 ENCOUNTER — PATIENT MESSAGE (OUTPATIENT)
Dept: PRIMARY CARE CLINIC | Facility: CLINIC | Age: 65
End: 2025-05-01
Payer: MEDICARE

## 2025-05-03 ENCOUNTER — PATIENT MESSAGE (OUTPATIENT)
Dept: ENDOCRINOLOGY | Facility: CLINIC | Age: 65
End: 2025-05-03
Payer: MEDICARE

## 2025-05-05 ENCOUNTER — TELEPHONE (OUTPATIENT)
Dept: ENDOCRINOLOGY | Facility: CLINIC | Age: 65
End: 2025-05-05
Payer: MEDICARE

## 2025-05-05 DIAGNOSIS — Z79.4 TYPE 2 DIABETES MELLITUS WITH STAGE 3A CHRONIC KIDNEY DISEASE, WITH LONG-TERM CURRENT USE OF INSULIN: Primary | ICD-10-CM

## 2025-05-05 DIAGNOSIS — E11.22 TYPE 2 DIABETES MELLITUS WITH STAGE 3A CHRONIC KIDNEY DISEASE, WITH LONG-TERM CURRENT USE OF INSULIN: Primary | ICD-10-CM

## 2025-05-05 DIAGNOSIS — N18.31 TYPE 2 DIABETES MELLITUS WITH STAGE 3A CHRONIC KIDNEY DISEASE, WITH LONG-TERM CURRENT USE OF INSULIN: Primary | ICD-10-CM

## 2025-05-05 RX ORDER — TIRZEPATIDE 5 MG/.5ML
5 INJECTION, SOLUTION SUBCUTANEOUS
Qty: 4 PEN | Refills: 3 | Status: SHIPPED | OUTPATIENT
Start: 2025-05-05

## 2025-05-06 ENCOUNTER — PATIENT MESSAGE (OUTPATIENT)
Dept: UROLOGY | Facility: CLINIC | Age: 65
End: 2025-05-06
Payer: MEDICARE

## 2025-05-06 ENCOUNTER — HOSPITAL ENCOUNTER (OUTPATIENT)
Dept: RADIOLOGY | Facility: HOSPITAL | Age: 65
Discharge: HOME | End: 2025-05-06
Payer: MEDICARE

## 2025-05-06 ENCOUNTER — RESULTS FOLLOW-UP (OUTPATIENT)
Dept: UROLOGY | Facility: CLINIC | Age: 65
End: 2025-05-06

## 2025-05-06 ENCOUNTER — TELEPHONE (OUTPATIENT)
Dept: UROLOGY | Facility: CLINIC | Age: 65
End: 2025-05-06
Payer: MEDICARE

## 2025-05-06 DIAGNOSIS — N39.0 RECURRENT UTI: ICD-10-CM

## 2025-05-06 DIAGNOSIS — Z94.0 KIDNEY REPLACED BY TRANSPLANT: ICD-10-CM

## 2025-05-06 DIAGNOSIS — N39.0 RECURRENT UTI: Primary | ICD-10-CM

## 2025-05-06 PROCEDURE — 74176 CT ABD & PELVIS W/O CONTRAST: CPT | Mod: TC,PO

## 2025-05-06 PROCEDURE — 74176 CT ABD & PELVIS W/O CONTRAST: CPT | Mod: 26,,, | Performed by: STUDENT IN AN ORGANIZED HEALTH CARE EDUCATION/TRAINING PROGRAM

## 2025-05-06 NOTE — TELEPHONE ENCOUNTER
Pt was notified about preferred location for urine specimen. Pt schedule for urine specimen is set May 7, 2025 @ 2:50 PM.

## 2025-05-07 ENCOUNTER — TELEPHONE (OUTPATIENT)
Dept: UROLOGY | Facility: CLINIC | Age: 65
End: 2025-05-07
Payer: MEDICARE

## 2025-05-07 NOTE — TELEPHONE ENCOUNTER
"----- Message from Addie Esquivel NP sent at 5/6/2025  3:29 PM CDT -----  CT was normal continue with work up as scheduled  ----- Message -----  From: Interface, Rad Results In  Sent: 5/6/2025   1:28 PM CDT  To: Addie Esquivel NP      Per Addie " CT was normal continue with work up as scheduled " Pt verbalized understanding.   "

## 2025-05-08 ENCOUNTER — TELEPHONE (OUTPATIENT)
Dept: UROLOGY | Facility: CLINIC | Age: 65
End: 2025-05-08
Payer: MEDICARE

## 2025-05-08 ENCOUNTER — LAB VISIT (OUTPATIENT)
Dept: LAB | Facility: HOSPITAL | Age: 65
End: 2025-05-08
Payer: MEDICARE

## 2025-05-08 ENCOUNTER — RESULTS FOLLOW-UP (OUTPATIENT)
Dept: TRANSPLANT | Facility: CLINIC | Age: 65
End: 2025-05-08

## 2025-05-08 DIAGNOSIS — Z94.0 KIDNEY REPLACED BY TRANSPLANT: ICD-10-CM

## 2025-05-08 DIAGNOSIS — N39.0 RECURRENT UTI: ICD-10-CM

## 2025-05-08 LAB
AMORPH CRY URNS QL MICRO: ABNORMAL
BACTERIA #/AREA URNS HPF: ABNORMAL /HPF
BILIRUB UR QL STRIP.AUTO: NEGATIVE
CAOX CRY URNS QL MICRO: ABNORMAL
CLARITY UR: ABNORMAL
COLOR UR AUTO: YELLOW
GLUCOSE UR QL STRIP: NEGATIVE
HGB UR QL STRIP: ABNORMAL
KETONES UR QL STRIP: ABNORMAL
LEUKOCYTE ESTERASE UR QL STRIP: ABNORMAL
MICROSCOPIC COMMENT: ABNORMAL
NITRITE UR QL STRIP: NEGATIVE
PH UR STRIP: 6 [PH]
PROT UR QL STRIP: ABNORMAL
RBC #/AREA URNS HPF: 5 /HPF (ref 0–4)
SP GR UR STRIP: 1.02
SQUAMOUS #/AREA URNS HPF: 1 /HPF
WBC #/AREA URNS HPF: 50 /HPF (ref 0–5)

## 2025-05-08 PROCEDURE — 81003 URINALYSIS AUTO W/O SCOPE: CPT | Mod: PO

## 2025-05-08 PROCEDURE — 87088 URINE BACTERIA CULTURE: CPT

## 2025-05-08 NOTE — TELEPHONE ENCOUNTER
Please see previous message sent by pt and response. QR----- Message from Bev sent at 5/8/2025 11:45 AM CDT -----  Contact: self  Type:  Needs Medical AdviceWho Called: pt Symptoms (please be specific):  How long has patient had these symptoms:  Pharmacy name and phone #:  Would the patient rather a call back or a response via [facility]? callBest Call Back Number: [phone] Information: pt states her lab results are and and would like for the office to give her a call to go over them  Please call back to advise. Thanks!

## 2025-05-11 LAB — BACTERIA UR CULT: ABNORMAL

## 2025-05-12 ENCOUNTER — PATIENT MESSAGE (OUTPATIENT)
Dept: UROLOGY | Facility: CLINIC | Age: 65
End: 2025-05-12
Payer: MEDICARE

## 2025-05-12 ENCOUNTER — TELEPHONE (OUTPATIENT)
Dept: UROLOGY | Facility: CLINIC | Age: 65
End: 2025-05-12
Payer: MEDICARE

## 2025-05-12 DIAGNOSIS — N30.00 ACUTE CYSTITIS WITHOUT HEMATURIA: Primary | ICD-10-CM

## 2025-05-12 RX ORDER — CEPHALEXIN 500 MG/1
500 CAPSULE ORAL EVERY 8 HOURS
Qty: 21 CAPSULE | Refills: 0 | Status: SHIPPED | OUTPATIENT
Start: 2025-05-12 | End: 2025-05-19

## 2025-05-12 NOTE — TELEPHONE ENCOUNTER
----- Message from Addie Esquivel NP sent at 5/12/2025  9:23 AM CDT -----  Keflex prescribed  ----- Message -----  From: Oracio Hendricks RN  Sent: 5/8/2025  11:42 AM CDT  To: Addie Esquivel NP    Patient C/O S/S of a UTI X 1 day.  Scheduled for a Cystoscope on 5/20.  ----- Message -----  From: Lab, Background User  Sent: 5/8/2025  10:13 AM CDT  To: Oracio Hendricks RN

## 2025-05-20 ENCOUNTER — PROCEDURE VISIT (OUTPATIENT)
Dept: UROLOGY | Facility: CLINIC | Age: 65
End: 2025-05-20
Payer: MEDICARE

## 2025-05-20 VITALS — WEIGHT: 180.31 LBS | BODY MASS INDEX: 28.98 KG/M2 | HEIGHT: 66 IN

## 2025-05-20 DIAGNOSIS — N39.0 RECURRENT UTI: Primary | ICD-10-CM

## 2025-05-20 PROCEDURE — 52000 CYSTOURETHROSCOPY: CPT | Mod: S$GLB,,, | Performed by: STUDENT IN AN ORGANIZED HEALTH CARE EDUCATION/TRAINING PROGRAM

## 2025-05-20 RX ORDER — CEPHALEXIN 500 MG/1
500 CAPSULE ORAL 2 TIMES DAILY
Qty: 6 CAPSULE | Refills: 0 | Status: SHIPPED | OUTPATIENT
Start: 2025-05-20 | End: 2025-05-23

## 2025-05-20 NOTE — PROCEDURES
Cystoscopy    Date/Time: 5/20/2025 2:30 PM    Performed by: Edwin Walker MD  Authorized by: Edwin Walker MD      Procedure:   Flexible cysto-urethroscopy    Pre Procedure Diagnosis:  recurrent UTIs    Post Procedure Diagnosis:  Same    Surgeon: Edwin Walker MD     Anesthesia: None    Procedure note:  The risks and benefits were explained and informed consent was obtained. The genitalia was prepped and draped in the sterile fashion.    The flexible cystoscope was inserted through the urethra, and this passed easily.   The bladder was completely surveyed in a systematic fashion. The bilateral ureteral orifices were identified in their normal orthotopic locations. There transplant UO was seen near the right dome of the bladder. There were no foreign bodies, stones, diverticula, or trabeculations. There were a few scattered erythematous areas consistent with cystitis cystica.  There was also squamous metaplasia at the trigone.  No bladder tumors or lesions suspicious for malignancy were seen.     As the flexible cystoscope was being removed, the urethra was evaluated and showed no abnormalities.    The patient tolerated the procedure well without complication.     Findings in summary:  Cystitis cystica, squamous metaplasia, no concerning abnormalities    Assessment:   Recently completed a course of antibiotics, cystitis cystica likely due to recent cystitis   No other concerning lesions noted    Plan:  Resume topical Estrace  Empiric antibiotics

## 2025-05-22 ENCOUNTER — PATIENT MESSAGE (OUTPATIENT)
Dept: OBSTETRICS AND GYNECOLOGY | Facility: CLINIC | Age: 65
End: 2025-05-22
Payer: MEDICARE

## 2025-06-02 DIAGNOSIS — N18.31 TYPE 2 DIABETES MELLITUS WITH STAGE 3A CHRONIC KIDNEY DISEASE, WITH LONG-TERM CURRENT USE OF INSULIN: ICD-10-CM

## 2025-06-02 DIAGNOSIS — E11.22 TYPE 2 DIABETES MELLITUS WITH STAGE 3A CHRONIC KIDNEY DISEASE, WITH LONG-TERM CURRENT USE OF INSULIN: ICD-10-CM

## 2025-06-02 DIAGNOSIS — Z79.4 TYPE 2 DIABETES MELLITUS WITH STAGE 3A CHRONIC KIDNEY DISEASE, WITH LONG-TERM CURRENT USE OF INSULIN: ICD-10-CM

## 2025-06-02 RX ORDER — BLOOD-GLUCOSE SENSOR
EACH MISCELLANEOUS
Qty: 9 EACH | Refills: 1 | Status: SHIPPED | OUTPATIENT
Start: 2025-06-02

## 2025-06-03 ENCOUNTER — OFFICE VISIT (OUTPATIENT)
Dept: PRIMARY CARE CLINIC | Facility: CLINIC | Age: 65
End: 2025-06-03
Payer: MEDICARE

## 2025-06-03 VITALS
TEMPERATURE: 98 F | DIASTOLIC BLOOD PRESSURE: 70 MMHG | HEART RATE: 102 BPM | OXYGEN SATURATION: 98 % | WEIGHT: 179.69 LBS | RESPIRATION RATE: 16 BRPM | BODY MASS INDEX: 28.88 KG/M2 | SYSTOLIC BLOOD PRESSURE: 136 MMHG | HEIGHT: 66 IN

## 2025-06-03 DIAGNOSIS — Z78.9 IMPAIRED MOBILITY AND ACTIVITIES OF DAILY LIVING: ICD-10-CM

## 2025-06-03 DIAGNOSIS — R25.1 TREMOR: Chronic | ICD-10-CM

## 2025-06-03 DIAGNOSIS — E78.2 MIXED DIABETIC HYPERLIPIDEMIA ASSOCIATED WITH TYPE 2 DIABETES MELLITUS: Chronic | ICD-10-CM

## 2025-06-03 DIAGNOSIS — N18.30 HYPERTENSION ASSOCIATED WITH STAGE 3 CHRONIC KIDNEY DISEASE DUE TO TYPE 2 DIABETES MELLITUS: Primary | Chronic | ICD-10-CM

## 2025-06-03 DIAGNOSIS — R41.3 MEMORY CHANGE: ICD-10-CM

## 2025-06-03 DIAGNOSIS — E03.9 ACQUIRED HYPOTHYROIDISM: Chronic | ICD-10-CM

## 2025-06-03 DIAGNOSIS — E11.22 HYPERTENSION ASSOCIATED WITH STAGE 3 CHRONIC KIDNEY DISEASE DUE TO TYPE 2 DIABETES MELLITUS: Primary | Chronic | ICD-10-CM

## 2025-06-03 DIAGNOSIS — E11.69 MIXED DIABETIC HYPERLIPIDEMIA ASSOCIATED WITH TYPE 2 DIABETES MELLITUS: Chronic | ICD-10-CM

## 2025-06-03 DIAGNOSIS — F13.20 BENZODIAZEPINE DEPENDENCE, CONTINUOUS: ICD-10-CM

## 2025-06-03 DIAGNOSIS — I12.9 HYPERTENSION ASSOCIATED WITH STAGE 3 CHRONIC KIDNEY DISEASE DUE TO TYPE 2 DIABETES MELLITUS: Primary | Chronic | ICD-10-CM

## 2025-06-03 DIAGNOSIS — N18.31 STAGE 3A CHRONIC KIDNEY DISEASE: Chronic | ICD-10-CM

## 2025-06-03 DIAGNOSIS — Z94.0 KIDNEY TRANSPLANT RECIPIENT: Chronic | ICD-10-CM

## 2025-06-03 DIAGNOSIS — Z74.09 IMPAIRED MOBILITY AND ACTIVITIES OF DAILY LIVING: ICD-10-CM

## 2025-06-03 DIAGNOSIS — Z12.31 ENCOUNTER FOR SCREENING MAMMOGRAM FOR MALIGNANT NEOPLASM OF BREAST: ICD-10-CM

## 2025-06-03 PROCEDURE — 99999 PR PBB SHADOW E&M-EST. PATIENT-LVL V: CPT | Mod: PBBFAC,,, | Performed by: FAMILY MEDICINE

## 2025-06-03 RX ORDER — METOPROLOL TARTRATE 25 MG/1
12.5 TABLET, FILM COATED ORAL 2 TIMES DAILY
Qty: 45 TABLET | Refills: 1 | Status: SHIPPED | OUTPATIENT
Start: 2025-06-03 | End: 2026-06-03

## 2025-06-03 RX ORDER — ONDANSETRON 4 MG/1
4 TABLET, ORALLY DISINTEGRATING ORAL EVERY 6 HOURS PRN
Qty: 30 TABLET | Refills: 3 | Status: SHIPPED | OUTPATIENT
Start: 2025-06-03

## 2025-06-06 ENCOUNTER — PATIENT MESSAGE (OUTPATIENT)
Dept: PRIMARY CARE CLINIC | Facility: CLINIC | Age: 65
End: 2025-06-06
Payer: MEDICARE

## 2025-06-06 DIAGNOSIS — M15.0 PRIMARY OSTEOARTHRITIS INVOLVING MULTIPLE JOINTS: ICD-10-CM

## 2025-06-06 DIAGNOSIS — M47.896 OTHER SPONDYLOSIS, LUMBAR REGION: ICD-10-CM

## 2025-06-06 DIAGNOSIS — N18.30 HYPERTENSION ASSOCIATED WITH STAGE 3 CHRONIC KIDNEY DISEASE DUE TO TYPE 2 DIABETES MELLITUS: ICD-10-CM

## 2025-06-06 DIAGNOSIS — E79.0 HYPERURICEMIA: ICD-10-CM

## 2025-06-06 DIAGNOSIS — I12.9 HYPERTENSION ASSOCIATED WITH STAGE 3 CHRONIC KIDNEY DISEASE DUE TO TYPE 2 DIABETES MELLITUS: ICD-10-CM

## 2025-06-06 DIAGNOSIS — R41.3 MEMORY CHANGE: ICD-10-CM

## 2025-06-06 DIAGNOSIS — E11.22 HYPERTENSION ASSOCIATED WITH STAGE 3 CHRONIC KIDNEY DISEASE DUE TO TYPE 2 DIABETES MELLITUS: ICD-10-CM

## 2025-06-06 DIAGNOSIS — M48.062 LUMBAR STENOSIS WITH NEUROGENIC CLAUDICATION: Primary | Chronic | ICD-10-CM

## 2025-06-06 RX ORDER — CLOPIDOGREL BISULFATE 75 MG/1
75 TABLET ORAL
Qty: 100 TABLET | Refills: 1 | Status: SHIPPED | OUTPATIENT
Start: 2025-06-06

## 2025-06-06 RX ORDER — LOSARTAN POTASSIUM 50 MG/1
50 TABLET ORAL
Qty: 100 TABLET | Refills: 1 | Status: SHIPPED | OUTPATIENT
Start: 2025-06-06

## 2025-06-06 RX ORDER — ALLOPURINOL 100 MG/1
100 TABLET ORAL
Qty: 100 TABLET | Refills: 1 | Status: SHIPPED | OUTPATIENT
Start: 2025-06-06

## 2025-06-06 NOTE — TELEPHONE ENCOUNTER
Spoke with pt and Any.  Advised them that I have faxed the referral over to [facility] for a PT/OT eval for pt to get a motorized scooter.  Additionally, I reached out to the Neuropsych dept and was advised that pt would need a referral placed to Neuropsych and not Neurology for memory loss.  Referral placed and I was advised that this dept would reach out to pt and see if they could get her scheduled prior to her f/u in our clinic.  Pt and Any verbalized understanding to all.

## 2025-06-06 NOTE — TELEPHONE ENCOUNTER
Spoke with Marian at [facility] and she requested that I fax an order over to them for PT/OT custom wheelchair evaluation.  Orders placed per Dr. Marina's recommendation from pt's visit with him on 6/3.  Referral packet faxed to [facility] at [phone].

## 2025-06-10 ENCOUNTER — TELEPHONE (OUTPATIENT)
Dept: NEUROLOGY | Facility: CLINIC | Age: 65
End: 2025-06-10
Payer: MEDICARE

## 2025-06-10 NOTE — TELEPHONE ENCOUNTER
----- Message from PÉREZ Scott sent at 6/10/2025  9:51 AM CDT -----  Regarding: Referral for memory change  Hi, There is a referral in the system for memory change that was placed last week.  This referral was inadvertently attached to an appt that was scheduled for tremors.  I have unlinked the referral as pt is already an established pt with Neurology for tremors.  I am hoping that someone from your office can get this pt scheduled in your clinic for memory change.Please let me know if you have any questions or concerns. Thank you, Sonia FriasCase Manager at [facility] [phone]

## 2025-06-10 NOTE — TELEPHONE ENCOUNTER
Spoke to the pt, appt scheduled on 8/14/25 at 1000 with Darby Chen for memory.  Pt is established with Darby for tremors.  This is a new problem.

## 2025-06-13 ENCOUNTER — TELEPHONE (OUTPATIENT)
Dept: CARDIOLOGY | Facility: CLINIC | Age: 65
End: 2025-06-13
Payer: MEDICARE

## 2025-06-13 NOTE — TELEPHONE ENCOUNTER
[facility]  PHONE:[phone]  FAX: [phone]  LUMBAR TRANS EPIDURAL  6/24/25  PLAVIX AND ASA  STENT  CLOSURE DEVICE ANGIO

## 2025-06-24 ENCOUNTER — LAB VISIT (OUTPATIENT)
Dept: LAB | Facility: HOSPITAL | Age: 65
End: 2025-06-24
Attending: NURSE PRACTITIONER
Payer: MEDICARE

## 2025-06-24 DIAGNOSIS — E03.9 ACQUIRED HYPOTHYROIDISM: Chronic | ICD-10-CM

## 2025-06-24 DIAGNOSIS — N18.31 TYPE 2 DIABETES MELLITUS WITH STAGE 3A CHRONIC KIDNEY DISEASE, WITH LONG-TERM CURRENT USE OF INSULIN: ICD-10-CM

## 2025-06-24 DIAGNOSIS — E11.22 TYPE 2 DIABETES MELLITUS WITH STAGE 3A CHRONIC KIDNEY DISEASE, WITH LONG-TERM CURRENT USE OF INSULIN: ICD-10-CM

## 2025-06-24 DIAGNOSIS — Z79.4 TYPE 2 DIABETES MELLITUS WITH STAGE 3A CHRONIC KIDNEY DISEASE, WITH LONG-TERM CURRENT USE OF INSULIN: ICD-10-CM

## 2025-06-24 LAB
EAG (OHS): 143 MG/DL (ref 68–131)
HBA1C MFR BLD: 6.6 % (ref 4–5.6)
TSH SERPL-ACNC: 0.42 UIU/ML (ref 0.4–4)

## 2025-06-24 PROCEDURE — 36415 COLL VENOUS BLD VENIPUNCTURE: CPT | Mod: PO

## 2025-06-24 PROCEDURE — 83036 HEMOGLOBIN GLYCOSYLATED A1C: CPT

## 2025-06-24 PROCEDURE — 84443 ASSAY THYROID STIM HORMONE: CPT

## 2025-06-25 ENCOUNTER — RESULTS FOLLOW-UP (OUTPATIENT)
Dept: PRIMARY CARE CLINIC | Facility: CLINIC | Age: 65
End: 2025-06-25
Payer: MEDICARE

## 2025-06-25 ENCOUNTER — RESULTS FOLLOW-UP (OUTPATIENT)
Dept: ENDOCRINOLOGY | Facility: CLINIC | Age: 65
End: 2025-06-25

## 2025-06-25 ENCOUNTER — HOSPITAL ENCOUNTER (OUTPATIENT)
Dept: RADIOLOGY | Facility: HOSPITAL | Age: 65
Discharge: HOME | End: 2025-06-25
Attending: FAMILY MEDICINE
Payer: MEDICARE

## 2025-06-25 ENCOUNTER — TELEPHONE (OUTPATIENT)
Dept: PRIMARY CARE CLINIC | Facility: CLINIC | Age: 65
End: 2025-06-25
Payer: MEDICARE

## 2025-06-25 DIAGNOSIS — Z12.31 ENCOUNTER FOR SCREENING MAMMOGRAM FOR MALIGNANT NEOPLASM OF BREAST: ICD-10-CM

## 2025-06-25 PROCEDURE — 77067 SCR MAMMO BI INCL CAD: CPT | Mod: 26,,, | Performed by: RADIOLOGY

## 2025-06-25 PROCEDURE — 77063 BREAST TOMOSYNTHESIS BI: CPT | Mod: TC,PO

## 2025-06-25 PROCEDURE — 77063 BREAST TOMOSYNTHESIS BI: CPT | Mod: 26,,, | Performed by: RADIOLOGY

## 2025-06-25 NOTE — TELEPHONE ENCOUNTER
LCSW called patient to see if she was interested in counseling services. Patient stated she was not interested at this time. LCSW told her to call if anything changes.

## 2025-07-01 ENCOUNTER — TELEPHONE (OUTPATIENT)
Dept: ENDOCRINOLOGY | Facility: CLINIC | Age: 65
End: 2025-07-01
Payer: MEDICARE

## 2025-07-01 RX ORDER — ISOSORBIDE MONONITRATE 30 MG/1
30 TABLET, EXTENDED RELEASE ORAL
Qty: 90 TABLET | Refills: 1 | Status: SHIPPED | OUTPATIENT
Start: 2025-07-01

## 2025-07-02 ENCOUNTER — OFFICE VISIT (OUTPATIENT)
Dept: ENDOCRINOLOGY | Facility: CLINIC | Age: 65
End: 2025-07-02
Payer: MEDICARE

## 2025-07-02 VITALS
BODY MASS INDEX: 27.72 KG/M2 | OXYGEN SATURATION: 96 % | HEART RATE: 75 BPM | HEIGHT: 66 IN | DIASTOLIC BLOOD PRESSURE: 60 MMHG | SYSTOLIC BLOOD PRESSURE: 102 MMHG | WEIGHT: 172.5 LBS

## 2025-07-02 DIAGNOSIS — E03.9 ACQUIRED HYPOTHYROIDISM: Chronic | ICD-10-CM

## 2025-07-02 DIAGNOSIS — E11.69 MIXED DIABETIC HYPERLIPIDEMIA ASSOCIATED WITH TYPE 2 DIABETES MELLITUS: Chronic | ICD-10-CM

## 2025-07-02 DIAGNOSIS — E11.22 HYPERTENSION ASSOCIATED WITH STAGE 3 CHRONIC KIDNEY DISEASE DUE TO TYPE 2 DIABETES MELLITUS: Chronic | ICD-10-CM

## 2025-07-02 DIAGNOSIS — E11.22 TYPE 2 DIABETES MELLITUS WITH STAGE 3A CHRONIC KIDNEY DISEASE, WITH LONG-TERM CURRENT USE OF INSULIN: Primary | ICD-10-CM

## 2025-07-02 DIAGNOSIS — E78.2 MIXED DIABETIC HYPERLIPIDEMIA ASSOCIATED WITH TYPE 2 DIABETES MELLITUS: Chronic | ICD-10-CM

## 2025-07-02 DIAGNOSIS — N18.31 TYPE 2 DIABETES MELLITUS WITH STAGE 3A CHRONIC KIDNEY DISEASE, WITH LONG-TERM CURRENT USE OF INSULIN: Primary | ICD-10-CM

## 2025-07-02 DIAGNOSIS — I12.9 HYPERTENSION ASSOCIATED WITH STAGE 3 CHRONIC KIDNEY DISEASE DUE TO TYPE 2 DIABETES MELLITUS: Chronic | ICD-10-CM

## 2025-07-02 DIAGNOSIS — I25.10 CORONARY ARTERY DISEASE INVOLVING NATIVE CORONARY ARTERY OF NATIVE HEART WITHOUT ANGINA PECTORIS: ICD-10-CM

## 2025-07-02 DIAGNOSIS — N18.30 HYPERTENSION ASSOCIATED WITH STAGE 3 CHRONIC KIDNEY DISEASE DUE TO TYPE 2 DIABETES MELLITUS: Chronic | ICD-10-CM

## 2025-07-02 DIAGNOSIS — Z94.0 KIDNEY TRANSPLANT RECIPIENT: Chronic | ICD-10-CM

## 2025-07-02 DIAGNOSIS — Z79.4 TYPE 2 DIABETES MELLITUS WITH STAGE 3A CHRONIC KIDNEY DISEASE, WITH LONG-TERM CURRENT USE OF INSULIN: Primary | ICD-10-CM

## 2025-07-02 RX ORDER — TIRZEPATIDE 2.5 MG/.5ML
2.5 INJECTION, SOLUTION SUBCUTANEOUS
Qty: 4 PEN | Refills: 6 | Status: SHIPPED | OUTPATIENT
Start: 2025-07-02

## 2025-07-02 NOTE — PROGRESS NOTES
CC: This 65 y.o. female  presents for management of diabetes  and chronic conditions pending review including HTN, HLP, ESRD s/p kidney txp 01/14/2019, hypothyroidism, vitamin d deficiency       HPI: She  was diagnosed with T2DM in 2005. Has never been hospitalized r/t DM.  Family hx of DM: grandmother     No acute events since her last visit  Wearing Dexcom G7 sensor- see download in Media tab;  0% Very High  12% High  88% In Range  0% Low  <1% Very Low  Rare pp excursions  BG look terrific, however minimal appetite    Diet: Eats 1 -2 Meals a day, snacks- cheese and crackers  Sometimes crackers and cheese  Skips lunch   S- mostly home  Exercise: none   CURRENT DM MEDS:   metformin xr 500 mg bid, Mounjaro 5 mg weekly (Saturday)   Previous meds: ozempic n/v  Standards of Care:  Eye exam: Dr. Ortega  5/2025- will need cataract sx on both bilaterally     Regarding hypothyroidism   Fmh mom  Takes LT4 with all her other meds   No hoarseness, voice changes, +dysphagia- off and on, no compressive symptoms, or head/neck exposure to XRT.   No personal or FH of thyroid cancer or MEN syndrome.   Not taking biotin.   + tremors of the hands  + intolerance to the heat  & cold  + hair loss       ROS: Gen: Appetite good, weight loss 11  lbs  Eyes: wears glasses  Resp: no SOB  Cardiac: no  palpitations ,  No chest pain-   GI: + nausea, no  diarrhea, no constipation      /GYN: 2+ nocturia, no burning or pain.   MS/Neuro: + left leg pain and tingling- r/t back issues, speech clear    PE:  GENERAL: Well developed, well nourished.  PSYCH: AAOx3, appropriate mood and affect, pleasant expression, conversant, appears relaxed, well groomed.   EYES: Conjunctiva, corneas clear  NECK: Supple, trachea midline,   SKIN:   no acanthosis nigracans.  FOOT EXAMINATION:  4/1/2025  No foot deformity, corns or callus formation,  nails in good condition and well trimmed, no interspace maceration or ulceration noted.  Decreased hair growth present over  "toes/feet.  Protective sensation intact with 10 gram monofilament.  +2 dorsalis pedis and posterior pulses noted.     Personally reviewed Past Medical, Surgical, Social History.    /60 (BP Location: Right arm, Patient Position: Sitting)   Pulse 75   Ht 5' 6" (1.676 m)   Wt 78.3 kg (172 lb 8.2 oz)   LMP 02/28/2012   SpO2 96%   BMI 27.84 kg/m²      Personally reviewed the below labs:      Chemistry        Component Value Date/Time     03/13/2025 1030    K 4.9 03/13/2025 1030     03/13/2025 1030    CO2 29 03/13/2025 1030    BUN 14 03/13/2025 1030    CREATININE 1.3 03/13/2025 1030     (H) 03/13/2025 1030        Component Value Date/Time    CALCIUM 10.4 03/13/2025 1030    ALKPHOS 113 02/14/2025 1200    AST 36 02/14/2025 1200    ALT 36 02/14/2025 1200    BILITOT 0.7 02/14/2025 1200    ESTGFRAFRICA >60.0 06/09/2022 0954    EGFRNONAA 59.7 (A) 06/09/2022 0954            Lab Results   Component Value Date    TSH 0.417 06/24/2025       Recent Labs   Lab 03/13/25  1030   LDL Cholesterol 43.8 L   HDL 26 L   Cholesterol 96 L        No results found. However, due to the size of the patient record, not all encounters were searched. Please check Results Review for a complete set of results.    No results found. However, due to the size of the patient record, not all encounters were searched. Please check Results Review for a complete set of results.      Lab Results   Component Value Date    MICALBCREAT 36.7 (H) 03/13/2025       Hemoglobin A1C   Date Value Ref Range Status   03/13/2025 6.7 (H) 4.0 - 5.6 % Final     Comment:     ADA Screening Guidelines:  5.7-6.4%  Consistent with prediabetes  >or=6.5%  Consistent with diabetes    High levels of fetal hemoglobin interfere with the HbA1C  assay. Heterozygous hemoglobin variants (HbS, HgC, etc)do  not significantly interfere with this assay.   However, presence of multiple variants may affect accuracy.     11/27/2024 8.1 (H) 4.0 - 5.6 % Final     Comment: "     ADA Screening Guidelines:  5.7-6.4%  Consistent with prediabetes  >or=6.5%  Consistent with diabetes    High levels of fetal hemoglobin interfere with the HbA1C  assay. Heterozygous hemoglobin variants (HbS, HgC, etc)do  not significantly interfere with this assay.   However, presence of multiple variants may affect accuracy.     08/28/2024 8.5 (H) 4.0 - 5.6 % Final     Comment:     ADA Screening Guidelines:  5.7-6.4%  Consistent with prediabetes  >or=6.5%  Consistent with diabetes    High levels of fetal hemoglobin interfere with the HbA1C  assay. Heterozygous hemoglobin variants (HbS, HgC, etc)do  not significantly interfere with this assay.   However, presence of multiple variants may affect accuracy.       Hemoglobin A1c   Date Value Ref Range Status   06/24/2025 6.6 (H) 4.0 - 5.6 % Final     Comment:     ADA Screening Guidelines:  5.7-6.4%  Consistent with prediabetes  >=6.5%  Consistent with diabetes    High levels of fetal hemoglobin interfere with the HbA1C  assay. Heterozygous hemoglobin variants (HbS, HgC, etc)do  not significantly interfere with this assay.   However, presence of multiple variants may affect accuracy.        ASSESSMENT and PLAN:      1. T2DM with  CKD 3-     Hold Mounjaro 5 mg  x 2 weeks, if nausea resolved resume Mounajro at 2.5 mg weekly - let me know please if symptoms return  Continue metformin and Dexcom G7     2. HTN - controlled, continue meds as previously prescribed and monitor.      3. HLP - on statin therapy     4. S/p kidney txp- followed by [facility] and nephrology      5.  Immunosuppression - agents may increase bg readings     6. Hypothyroidism - takes with all other meds, clinically euthyroid, TSH WNL      F/u in 6 months w A1C, cMP, lipid, TSH,

## 2025-07-07 ENCOUNTER — TELEPHONE (OUTPATIENT)
Dept: CARDIOLOGY | Facility: CLINIC | Age: 65
End: 2025-07-07
Payer: MEDICARE

## 2025-07-08 ENCOUNTER — OFFICE VISIT (OUTPATIENT)
Dept: PRIMARY CARE CLINIC | Facility: CLINIC | Age: 65
End: 2025-07-08
Payer: MEDICARE

## 2025-07-08 VITALS
HEART RATE: 94 BPM | BODY MASS INDEX: 28.07 KG/M2 | WEIGHT: 173.88 LBS | OXYGEN SATURATION: 96 % | SYSTOLIC BLOOD PRESSURE: 120 MMHG | DIASTOLIC BLOOD PRESSURE: 70 MMHG

## 2025-07-08 DIAGNOSIS — Z00.00 ENCOUNTER FOR MEDICARE ANNUAL WELLNESS EXAM: Primary | ICD-10-CM

## 2025-07-08 DIAGNOSIS — E11.22 HYPERTENSION ASSOCIATED WITH STAGE 3 CHRONIC KIDNEY DISEASE DUE TO TYPE 2 DIABETES MELLITUS: Chronic | ICD-10-CM

## 2025-07-08 DIAGNOSIS — E11.69 MIXED DIABETIC HYPERLIPIDEMIA ASSOCIATED WITH TYPE 2 DIABETES MELLITUS: Chronic | ICD-10-CM

## 2025-07-08 DIAGNOSIS — F13.20 BENZODIAZEPINE DEPENDENCE, CONTINUOUS: Chronic | ICD-10-CM

## 2025-07-08 DIAGNOSIS — N18.30 HYPERTENSION ASSOCIATED WITH STAGE 3 CHRONIC KIDNEY DISEASE DUE TO TYPE 2 DIABETES MELLITUS: Chronic | ICD-10-CM

## 2025-07-08 DIAGNOSIS — E78.2 MIXED DIABETIC HYPERLIPIDEMIA ASSOCIATED WITH TYPE 2 DIABETES MELLITUS: Chronic | ICD-10-CM

## 2025-07-08 DIAGNOSIS — I12.9 HYPERTENSION ASSOCIATED WITH STAGE 3 CHRONIC KIDNEY DISEASE DUE TO TYPE 2 DIABETES MELLITUS: Chronic | ICD-10-CM

## 2025-07-08 DIAGNOSIS — N18.31 TYPE 2 DIABETES MELLITUS WITH STAGE 3A CHRONIC KIDNEY DISEASE, WITH LONG-TERM CURRENT USE OF INSULIN: ICD-10-CM

## 2025-07-08 DIAGNOSIS — E11.22 TYPE 2 DIABETES MELLITUS WITH STAGE 3A CHRONIC KIDNEY DISEASE, WITH LONG-TERM CURRENT USE OF INSULIN: ICD-10-CM

## 2025-07-08 DIAGNOSIS — Z79.4 TYPE 2 DIABETES MELLITUS WITH STAGE 3A CHRONIC KIDNEY DISEASE, WITH LONG-TERM CURRENT USE OF INSULIN: ICD-10-CM

## 2025-07-08 PROBLEM — E66.9 OBESITY (BMI 30-39.9): Chronic | Status: RESOLVED | Noted: 2019-06-28 | Resolved: 2025-07-08

## 2025-07-08 PROBLEM — E03.9 ACQUIRED HYPOTHYROIDISM: Chronic | Status: RESOLVED | Noted: 2019-11-19 | Resolved: 2025-07-08

## 2025-07-08 PROCEDURE — 1160F RVW MEDS BY RX/DR IN RCRD: CPT | Mod: CPTII,S$GLB,, | Performed by: PHYSICIAN ASSISTANT

## 2025-07-08 PROCEDURE — 3074F SYST BP LT 130 MM HG: CPT | Mod: CPTII,S$GLB,, | Performed by: PHYSICIAN ASSISTANT

## 2025-07-08 PROCEDURE — 1159F MED LIST DOCD IN RCRD: CPT | Mod: CPTII,S$GLB,, | Performed by: PHYSICIAN ASSISTANT

## 2025-07-08 PROCEDURE — 1123F ACP DISCUSS/DSCN MKR DOCD: CPT | Mod: CPTII,S$GLB,, | Performed by: PHYSICIAN ASSISTANT

## 2025-07-08 PROCEDURE — 3044F HG A1C LEVEL LT 7.0%: CPT | Mod: CPTII,S$GLB,, | Performed by: PHYSICIAN ASSISTANT

## 2025-07-08 PROCEDURE — 3288F FALL RISK ASSESSMENT DOCD: CPT | Mod: CPTII,S$GLB,, | Performed by: PHYSICIAN ASSISTANT

## 2025-07-08 PROCEDURE — 3066F NEPHROPATHY DOC TX: CPT | Mod: CPTII,S$GLB,, | Performed by: PHYSICIAN ASSISTANT

## 2025-07-08 PROCEDURE — G0439 PPPS, SUBSEQ VISIT: HCPCS | Mod: S$GLB,,, | Performed by: PHYSICIAN ASSISTANT

## 2025-07-08 PROCEDURE — 1100F PTFALLS ASSESS-DOCD GE2>/YR: CPT | Mod: CPTII,S$GLB,, | Performed by: PHYSICIAN ASSISTANT

## 2025-07-08 PROCEDURE — 3060F POS MICROALBUMINURIA REV: CPT | Mod: CPTII,S$GLB,, | Performed by: PHYSICIAN ASSISTANT

## 2025-07-08 PROCEDURE — 1170F FXNL STATUS ASSESSED: CPT | Mod: CPTII,S$GLB,, | Performed by: PHYSICIAN ASSISTANT

## 2025-07-08 PROCEDURE — 99999 PR PBB SHADOW E&M-EST. PATIENT-LVL V: CPT | Mod: PBBFAC,,, | Performed by: PHYSICIAN ASSISTANT

## 2025-07-08 PROCEDURE — 1125F AMNT PAIN NOTED PAIN PRSNT: CPT | Mod: CPTII,S$GLB,, | Performed by: PHYSICIAN ASSISTANT

## 2025-07-08 PROCEDURE — 4010F ACE/ARB THERAPY RXD/TAKEN: CPT | Mod: CPTII,S$GLB,, | Performed by: PHYSICIAN ASSISTANT

## 2025-07-08 PROCEDURE — 3078F DIAST BP <80 MM HG: CPT | Mod: CPTII,S$GLB,, | Performed by: PHYSICIAN ASSISTANT

## 2025-07-08 NOTE — PATIENT INSTRUCTIONS
Counseling and Referral of Other Preventative  (Italic type indicates deductible and co-insurance are waived)    Patient Name: Andra Newell  Today's Date: 7/8/2025    Health Maintenance       Date Due Completion Date    Shingles Vaccine (1 of 2) Never done ---    RSV Vaccine (Age 60+ and Pregnant patients) (1 - Risk 60-74 years 1-dose series) Never done ---    COVID-19 Vaccine (3 - Pfizer risk series) 08/11/2021 7/14/2021    Pneumococcal Vaccines (Age 50+) (3 of 3 - PPSV23, PCV20 or PCV21) 04/30/2024 4/30/2019    Colorectal Cancer Screening 05/09/2024 5/9/2019    Diabetic Eye Exam 05/07/2025 5/7/2024    Influenza Vaccine (1) 09/01/2025 10/12/2023    Override on 10/6/2020: Done (completed at [facility])    Hemoglobin A1c 12/24/2025 6/24/2025    Diabetes Urine Screening 03/13/2026 3/13/2025    Lipid Panel 03/13/2026 3/13/2025    Foot Exam 04/01/2026 4/1/2025    Override on 10/2/2019: Done    Mammogram 06/25/2026 6/25/2025    High Dose Statin 07/02/2026 7/2/2025    Aspirin/Antiplatelet Therapy 07/02/2026 7/2/2025    TETANUS VACCINE 07/12/2027 7/12/2017    DEXA Scan 09/12/2028 9/12/2023        No orders of the defined types were placed in this encounter.    The following information is provided to all patients.  This information is to help you find resources for any of the problems found today that may be affecting your health:                  Living healthy guide: www.[facility].[place].gov      Understanding Diabetes: www.diabetes.org      Eating healthy: www.cdc.gov/healthyweight      CDC home safety checklist: www.cdc.gov/steadi/patient.html      Agency on Aging: www.goea.[place].gov      Alcoholics anonymous (AA): www.aa.org      Physical Activity: www.abigail.nih.gov/xa5suah      Tobacco use: www.quitwithusla.org

## 2025-07-08 NOTE — PROGRESS NOTES
Andranicolette BarbosaNewell presented for a follow-up Medicare AWV today. The following components were reviewed and updated:    Medical history  Family History  Social history  Allergies and Current Medications  Health Risk Assessment  Health Maintenance  Care Team    **See Completed Assessments for Annual Wellness visit with in the encounter summary    The following assessments were completed:  Depression Screening  Cognitive function Screening  Timed Get Up Test  Whisper Test        Opioid documentation:      Patient does have a current opioid prescription.      Patient declines further discussion regarding opioid medication use.           Vitals:    07/08/25 1347   BP: 120/70   BP Location: Right arm   Patient Position: Sitting   Pulse: 94   SpO2: 96%   Weight: 78.9 kg (173 lb 14.4 oz)     Body mass index is 28.07 kg/m².       Physical Exam  Vitals and nursing note reviewed.   Constitutional:       General: She is not in acute distress.     Appearance: Normal appearance. She is not ill-appearing.   HENT:      Head: Normocephalic and atraumatic.      Right Ear: External ear normal.      Left Ear: External ear normal.   Eyes:      General:         Right eye: No discharge.         Left eye: No discharge.      Extraocular Movements: Extraocular movements intact.      Conjunctiva/sclera: Conjunctivae normal.   Cardiovascular:      Rate and Rhythm: Normal rate.   Pulmonary:      Effort: Pulmonary effort is normal. No respiratory distress.   Skin:     General: Skin is warm and dry.      Coloration: Skin is not jaundiced or pale.   Neurological:      Mental Status: She is alert.      Comments: Tremor noted   Psychiatric:         Mood and Affect: Mood normal.         Behavior: Behavior normal.         Thought Content: Thought content normal.         Judgment: Judgment normal.         Diagnoses and health risks identified today and associated recommendations/orders:  1. Encounter for Medicare annual wellness exam  AWV completed  today  Discussed all health care maintenance needed  Unable to refer for colonoscopy today due to EMR error  Sees Dr. Soniya Ortega for eye exams  - Referral to Enhanced Annual Wellness Visit (eAWV) W+1    2. Benzodiazepine dependence, continuous  Takes Ativan daily  Also takes Hydrocodone  Patient followed by Psychiatry    3. Mixed diabetic hyperlipidemia associated with type 2 diabetes mellitus  Last cholesterol panel 96 total  Triglycerides at 131  LDL 43.8  Takes Crestor  Followed by PCP and Endocrinology    4. Essential hypertension   Well controlled  On multi drug regimen  No reports of high or low readings at home  Followed by PCP    5. Type 2 diabetes mellitus with stage 3a chronic kidney disease, with long-term current use of insulin  Last A1c 6.6  Using Dexcom, last check 88% within range  Has lost some weight this year  Followed by Endocrinology    Provided Andra with a 5-10 year written screening schedule and personal prevention plan. Recommendations were developed using the USPSTF age appropriate recommendations. Education, counseling, and referrals were provided as needed.  After Visit Summary printed and given to patient which includes a list of additional screenings\tests needed.    Follow up in about 1 year (around 7/8/2026).      MERCEDES Plascencia  I offered to discuss advanced care planning, including how to pick a person who would make decisions for you if you were unable to make them for yourself, called a health care power of , and what kind of decisions you might make such as use of life sustaining treatments such as ventilators and tube feeding when faced with a life limiting illness recorded on a living will that they will need to know. (How you want to be cared for as you near the end of your natural life)     X  Patient has advanced directives on file, which we reviewed, and they do not wish to make changes.

## 2025-07-09 ENCOUNTER — PATIENT MESSAGE (OUTPATIENT)
Dept: PRIMARY CARE CLINIC | Facility: CLINIC | Age: 65
End: 2025-07-09
Payer: MEDICARE

## 2025-07-10 NOTE — TELEPHONE ENCOUNTER
I addressed under a telephone note in Any's chart. She inadvertently sent it through [facility] [employer]

## 2025-07-18 DIAGNOSIS — I12.9 HYPERTENSION ASSOCIATED WITH STAGE 3 CHRONIC KIDNEY DISEASE DUE TO TYPE 2 DIABETES MELLITUS: Primary | Chronic | ICD-10-CM

## 2025-07-18 DIAGNOSIS — E11.22 HYPERTENSION ASSOCIATED WITH STAGE 3 CHRONIC KIDNEY DISEASE DUE TO TYPE 2 DIABETES MELLITUS: Primary | Chronic | ICD-10-CM

## 2025-07-18 DIAGNOSIS — N18.30 HYPERTENSION ASSOCIATED WITH STAGE 3 CHRONIC KIDNEY DISEASE DUE TO TYPE 2 DIABETES MELLITUS: Primary | Chronic | ICD-10-CM

## 2025-07-21 RX ORDER — METOPROLOL TARTRATE 25 MG/1
12.5 TABLET, FILM COATED ORAL 2 TIMES DAILY
Qty: 90 TABLET | Refills: 1 | Status: SHIPPED | OUTPATIENT
Start: 2025-07-21

## 2025-08-05 ENCOUNTER — TELEPHONE (OUTPATIENT)
Dept: NEPHROLOGY | Facility: CLINIC | Age: 65
End: 2025-08-05
Payer: MEDICARE

## 2025-08-07 ENCOUNTER — TELEPHONE (OUTPATIENT)
Facility: CLINIC | Age: 65
End: 2025-08-07

## 2025-08-07 ENCOUNTER — OFFICE VISIT (OUTPATIENT)
Facility: CLINIC | Age: 65
End: 2025-08-07
Payer: MEDICARE

## 2025-08-07 VITALS
HEIGHT: 66 IN | HEART RATE: 77 BPM | BODY MASS INDEX: 27.99 KG/M2 | DIASTOLIC BLOOD PRESSURE: 67 MMHG | SYSTOLIC BLOOD PRESSURE: 103 MMHG | WEIGHT: 174.19 LBS

## 2025-08-07 DIAGNOSIS — G20.C PARKINSONISM, UNSPECIFIED PARKINSONISM TYPE: Primary | ICD-10-CM

## 2025-08-07 DIAGNOSIS — R41.3 MEMORY CHANGE: ICD-10-CM

## 2025-08-07 DIAGNOSIS — R25.1 TREMOR: Chronic | ICD-10-CM

## 2025-08-07 PROCEDURE — 99999 PR PBB SHADOW E&M-EST. PATIENT-LVL III: CPT | Mod: PBBFAC,,, | Performed by: PSYCHIATRY & NEUROLOGY

## 2025-08-07 NOTE — TELEPHONE ENCOUNTER
"Received message from Dr. Hankins  "Can you please call her Psychiatry team and ask if she may start carbidopa/levodopa 25/100mg 1 tab PO TID for drug-induced Pdsim? "      [phone]      Dr. Roseanne Sheth MD    Called Dr. Sheth's office and spoke with staff. They are pulling the chart for him to review and he will leave a message for Neurology when he's done seeing patients.  "

## 2025-08-07 NOTE — Clinical Note
Can you please call her Psychiatry team and ask if she may start carbidopa/levodopa 25/100mg 1 tab PO TID for drug-induced Pdsim? [phone] - Dr. Roseanne Sheth

## 2025-08-07 NOTE — PROGRESS NOTES
MOVEMENT DISORDERS CLINIC NEW CONSULT NOTE    PCP/Referring Provider: Fady Vaughn MD  [address] [address]  Date of Service: 8/7/2025    Chief Complaint: Tremors    HPI: Andra Newell is a R HANDED 65 y.o. female with a medical issues significant for Chronic back pain, Kidney transplant, Cspine surgery, b/l knee surg, PTSD, Depression, CKD3, CAD, coming for tremor eval- Saw NP Goldy who thought it may be multifactorial - possible tacrolimus and vraylar. Tremors seem to be progressively steadily worse.   Tremor started 2.5 years ago L arm and L foot- postural - crossed to R side 1 year ago.  Imbalance since 2 years.  Toe dystonia L foot x 6 months    No fam hx tremor    MRI brain 2021 showed scattered WM dz    Medication history:  Unclear if she tried propranolol or primidone    Neuroleptic exposure:  Was on Vraylar stopped for 2 months and tremor was no better    PD Review of Symptoms:  Anosmia: ?  Dysarthria/Hypophonia: -  Dysphagia/Sialorrhea: -  Depression: yes  Cognitive slowing: -  Hallucinations: -  Impulsivity: -  Obsessions/Compulsions: -  Urinary changes: -  Constipation: -  Orthostasis: -  Erectile Dysfunction: -  Dyskinesia: -  Falls: -  Freezing: -  Micrographia: -  Sleep issues:  -RBD: None    Review of Systems:   Review of Systems   Constitutional:  Negative for fever.   HENT:  Negative for congestion.    Eyes:  Negative for double vision.   Respiratory:  Negative for cough and shortness of breath.    Cardiovascular:  Negative for chest pain and leg swelling.   Gastrointestinal:  Negative for nausea.   Genitourinary:  Negative for dysuria.   Musculoskeletal:  Positive for falls.   Skin:  Negative for rash.   Neurological:  Positive for tremors. Negative for speech change and headaches.   Psychiatric/Behavioral:  Positive for depression.          Current Medications:  Encounter Medications[1]    Past Medical History:  Problem List[2]    Past Surgical  "History:  Past Surgical History:   Procedure Laterality Date    ACHILLES TENDON SURGERY Left 05/2015    ANGIOGRAM, CORONARY, WITH LEFT HEART CATHETERIZATION N/A 7/10/2023    Procedure: Angiogram, Coronary, with Left Heart Cath;  Surgeon: Ajay Hunter MD;  Location: [facility] CATH/EP LAB;  Service: Cardiology;  Laterality: N/A;    BACK SURGERY      CERVICAL SPINE SURGERY  2021    cervical gate placed by Dr. Vera- screws,plate, and "gate"    CHOLECYSTECTOMY      COLONOSCOPY N/A 09/06/2017    Procedure: COLONOSCOPY;  Surgeon: Marisol Bryson MD;  Location: [facility];  Service: Endoscopy;  Laterality: N/A; REPEAT IN 3 YEARS FOR SURVEILLANCE    COLONOSCOPY N/A 05/09/2019    Procedure: COLONOSCOPY;  Surgeon: Fady Ewing MD;  Location: [facility] ([facility] FLR);  Service: Endoscopy;  Laterality: N/A;    CORONARY ANGIOGRAPHY  08/31/2022    Procedure: ANGIOGRAM, CORONARY ARTERY;  Surgeon: Christoph Dang MD;  Location: [facility] CATH;  Service: Cardiology;;    DIALYSIS FISTULA CREATION  12/2017    ESOPHAGOGASTRODUODENOSCOPY N/A 05/09/2019    Procedure: EGD (ESOPHAGOGASTRODUODENOSCOPY);  Surgeon: Fady Ewing MD;  [facility]  Service: Endoscopy;  Laterality: N/A;    ESOPHAGOGASTRODUODENOSCOPY N/A 06/10/2021    Procedure: EGD (ESOPHAGOGASTRODUODENOSCOPY);  Surgeon: Marisol Bryson MD;  Location: [facility] ENDO;  Service: Endoscopy;  Laterality: N/A;    ESOPHAGOGASTRODUODENOSCOPY N/A 5/31/2023    Procedure: EGD (ESOPHAGOGASTRODUODENOSCOPY);  Surgeon: Marisol Bryson MD;  Location: [facility];  Service: Endoscopy;  Laterality: N/A;    FRACTURE SURGERY  1990    Ankle knee    HEMORRHOID SURGERY      INJECTION OF ANESTHETIC AGENT AROUND MEDIAL BRANCH NERVES INNERVATING LUMBAR FACET JOINT Right 09/25/2019    Procedure: Block-nerve-medial branch-lumbar;  Surgeon: Yonny Ferrer MD;  Location: [facility] OR;  Service: Pain Management;  Laterality: Right;  L2, 3, 4,5     INJECTION OF ANESTHETIC AGENT AROUND MEDIAL BRANCH " NERVES INNERVATING LUMBAR FACET JOINT Right 10/16/2019    Procedure: Block-nerve-medial branch-lumbar;  Surgeon: Yonny Ferrer MD;  Location: [facility] OR;  Service: Pain Management;  Laterality: Right;  L2, 3, 4,5     JOINT REPLACEMENT      left    KIDNEY TRANSPLANT N/A 01/14/2019    Procedure: TRANSPLANT, KIDNEY;  Surgeon: Roland Salazar MD;  Location: [facility] OR 2ND FLR;  Service: Transplant;  Laterality: N/A;    KNEE SURGERY      RADIOFREQUENCY ABLATION OF LUMBAR MEDIAL BRANCH NERVE AT SINGLE LEVEL Right 10/29/2019    Procedure: Radiofrequency Ablation, Nerve, Spinal, Lumbar, Medial Branch, 1 Level;  Surgeon: Yonny Ferrer MD;  Location: [facility] OR;  Service: Pain Management;  Laterality: Right;  L2, 3, 4, 5  burned at 80 degrees C X 60 seconds each site X 2    SHOULDER ARTHROSCOPY      SHOULDER SURGERY      SPINE SURGERY  2020    Pain pump in back    UPPER GASTROINTESTINAL ENDOSCOPY  ~2013    Dr. Moralez       Social:  Social History[3]    Family History:  Family History   Problem Relation Name Age of Onset    Diabetes Mother age 78     Alzheimer's disease Mother age 78     Thyroid disease Mother age 78     Hyperlipidemia Mother age 78     Arthritis Mother age 78     Depression Mother age 78     Heart disease Mother age 78     Heart disease Father age unk     Stroke Father age unk     Diabetes Sister 2 HALF     Lupus Sister 2 HALF     Ovarian cancer Sister 2 HALF     Mental illness Sister 2 HALF     Breast cancer Sister      COPD Maternal Aunt 5,4 decd     Diabetes Maternal Aunt 5,4 decd     Heart disease Maternal Aunt 5,4 decd     Hypertension Maternal Aunt 5,4 decd     No Known Problems Maternal Uncle 4,3 decd     No Known Problems Paternal Aunt 3-4     No Known Problems Paternal Uncle 4     Diabetes Maternal Grandmother Tami linares     Hypertension Maternal Grandmother Tami linares     Kidney disease Maternal Grandmother Tami linares     No Known Problems Maternal Grandfather      No Known Problems Paternal  "Grandmother      No Known Problems Paternal Grandfather      Heart disease Brother Jax linares     No Known Problems Son 2     No Known Problems Other      Colon cancer Neg Hx      Colon polyps Neg Hx      Crohn's disease Neg Hx      Ulcerative colitis Neg Hx      Celiac disease Neg Hx      Esophageal cancer Neg Hx      Stomach cancer Neg Hx      BRCA 1/2 Neg Hx         PHYSICAL:  /67 (BP Location: Right arm, Patient Position: Sitting)   Pulse 77   Ht 5' 6" (1.676 m)   Wt 79 kg (174 lb 2.6 oz)   LMP 02/28/2012   BMI 28.11 kg/m²     General Medical Examination:  General: Good hygiene, appropriate appearance.  HEENT: Normocephalic, atraumatic.   Neck: Supple.   Chest: Unlabored breathing.   CV: Symmetric pulses.   Ext: No clubbing, cyanosis, or edema.     Mental Status:  Mood/Affect: Appropriate/congruent.  Level of consciousness: Awake, alert.  Orientation: Oriented to person, place, time and situation.  Language: No Dysarthria    Cranial nerves:  I: Not tested  II: PERRL, VFF to counting  III, IV, VI: EOMI with conjugate gaze and no nystagmus on end gaze  V: Facial sensation intact and symmetric over the bilateral V1-V3  VII: Facial muscle activation intact and symmetric over the bilateral upper and lower face  VIII: Hearing intact in the b/l ears and symmetrical to finger rub  IX, X, XII: TUP midline - no atrophy or fasiculations  X: SCMs and shoulder shrug full strength b/l and symmetric    Motor:   -UE: 5/5 deltoids; 5/5 biceps, triceps; 5/5 wrist flexors, extensors; 5/5 interosseous; 5/5   -LEs: 5/5 hip flexion, extension; 5/5 knee flexion, extension; 5/5 ankle flexion, extension    DTRs:  ? Biceps Triceps Brachioradialis Knee Ankle   Left 2+   2+    Right 2+   2+        ? Finger taps Finger flicks KIMANI Heel taps   Left 1+ 1+ 0 1+   Right 0 0 0 0     Neck tone: nl  ? Arm Leg   Left 1+ 1+   Right 0 0     Sensation:   -Light touch: Intact and symmetric in the bilateral upper and lower " extremities.    Coordination:   -Finger to nose: nl    Gait:  Stiff, short stride, somewhat antalgic      Tremor Exam   Arms extended Arms in wing position, fingers almost touching Re-emergent Arms extended wrists extended Intention Resting Kinetic   Left ? ? ? ? ? ?++ ?   Right ? ? ? ? ? ?+ ?        Archimedes Spirals   Left ?+   Right ?         Laboratory Data:  NA    Imaging:  MRI brain 2021  Moderate chronic white matter changes, commonly reflective of chronic ischemic microvascular disease.  Demyelinating processes such as multiple sclerosis may have a similar appearance.     Patchy bilateral mastoid effusions.    Assessment//Plan:   Problem List Items Addressed This Visit          Neuro    Tremor - Primary (Chronic)    Current Assessment & Plan   Suggested she bring her MRI brain recently done    Has several medications which can cause tremor Vraylar and tacrolimus  Has a parkinsonian tremor today, soft voice toe dystonia, rigidity suggestive of Pdism  Suggested a synone biopsy to differentiate drug induced Pdism vs iPD    Will ask Psychiatry team if she may start carbidopa/levodopa 25/100mg 1 tab PO TID for drug-induced Pdsim  [phone] - Dr. Roseanne Sheth          Other Visit Diagnoses         Memory change                  Virginia Hankins MD, MS  [facility]  Department of Neurology  Movement Disorders           [1]   Outpatient Encounter Medications as of 8/7/2025   Medication Sig Dispense Refill    acetaminophen (TYLENOL) 650 MG TbSR Take 650 mg by mouth every 8 (eight) hours as needed (pain).      allopurinoL (ZYLOPRIM) 100 MG tablet TAKE 1 TABLET BY MOUTH ONCE DAILY 100 tablet 1    amLODIPine (NORVASC) 5 MG tablet Take 1 tablet by mouth once daily.      benzonatate (TESSALON) 100 MG capsule Take 1 capsule (100 mg total) by mouth 3 (three) times daily as needed for Cough. 28 capsule 1    blood sugar diagnostic Strp Check glucose 3 (three) times daily. (Patient not taking: Reported on  7/8/2025) 300 each PRN    clopidogreL (PLAVIX) 75 mg tablet TAKE 1 TABLET BY MOUTH ONCE DAILY 100 tablet 1    colchicine (COLCRYS) 0.6 mg tablet TAKE 1 TABLET (0.6 MG TOTAL) BY MOUTH ONCE DAILY. 90 tablet 3    DEXCOM G7  Misc Dispense 1 1 each 0    DEXCOM G7 SENSOR Amparo CHANGE EVERY 10 DAYS AS DIRECTED BY PHYSICIAN 9 each 1    doxazosin (CARDURA) 2 MG tablet Take 2 mg by mouth once daily.      HYDROcodone-acetaminophen (NORCO)  mg per tablet Take 1 tablet by mouth every 24 hours as needed for Pain.      hydroxychloroquine (PLAQUENIL) 200 mg tablet TAKE 1 TABLET BY MOUTH TWICE DAILY 60 tablet 3    isosorbide mononitrate (IMDUR) 30 MG 24 hr tablet TAKE 1 TABLET BY MOUTH ONCE DAILY 90 tablet 1    ketoconazole (NIZORAL) 2 % shampoo Wash hair with medicated shampoo at least 2x/week - let sit on scalp at least 5 minutes prior to rinsing (Patient not taking: Reported on 7/8/2025) 120 mL 5    lansoprazole (PREVACID) 30 MG capsule Take 1 capsule (30 mg total) by mouth once daily. 90 capsule 1    levothyroxine (SYNTHROID) 75 MCG tablet TAKE 1 TABLET (75 MCG TOTAL) BY MOUTH ONCE DAILY. 90 tablet 3    LIDOcaine (LIDODERM) 5 % 1 patch.      LORazepam (ATIVAN) 1 MG tablet Take 1 mg by mouth every evening.      losartan (COZAAR) 50 MG tablet TAKE 1 TABLET BY MOUTH ONCE DAILY FOR BLOOD PRESSURE 100 tablet 1    metFORMIN (GLUCOPHAGE-XR) 500 MG ER 24hr tablet TAKE 1 TABLET (500 MG TOTAL) BY MOUTH 2 TIMES DAILY WITH MEALS. 180 tablet 2    metoprolol tartrate (LOPRESSOR) 25 MG tablet TAKE 1/2 TABLET BY MOUTH TWICE DAILY 90 tablet 1    multivitamin (THERAGRAN) tablet Take 1 tablet by mouth once daily.      nitroGLYCERIN (NITROSTAT) 0.4 MG SL tablet Place 1 tablet (0.4 mg total) under the tongue every 5 (five) minutes as needed for Chest pain. 20 tablet 1    ondansetron (ZOFRAN-ODT) 4 MG TbDL Take 1 tablet (4 mg total) by mouth every 6 (six) hours as needed (nausea). 30 tablet 3    rosuvastatin (CRESTOR) 20 MG tablet Take  20 mg by mouth once daily.      sucralfate (CARAFATE) 1 gram tablet Take 1 tablet (1 g total) by mouth 4 (four) times daily before meals and nightly. 120 tablet 3    tacrolimus (PROGRAF) 1 MG Cap Take 1 capsule (1 mg total) by mouth every 12 (twelve) hours. 60 capsule 11    tirzepatide (MOUNJARO) 2.5 mg/0.5 mL PnIj Inject 2.5 mg into the skin every 7 days. 4 Pen 6    vilazodone (VIIBRYD) 40 mg Tab tablet Take 1 tablet (40 mg total) by mouth once daily. 30 tablet 4    VRAYLAR 3 mg Cap Take 3 mg by mouth once daily.      [DISCONTINUED] metoprolol tartrate (LOPRESSOR) 25 MG tablet Take 0.5 tablets (12.5 mg total) by mouth 2 (two) times daily. 45 tablet 1     No facility-administered encounter medications on file as of 8/7/2025.   [2]   Patient Active Problem List  Diagnosis    Left hip pain    Pain in both knees    Hip arthritis    MDD (major depressive disorder), recurrent episode, mild    MRSA (methicillin resistant Staphylococcus aureus) carrier    Rotator cuff syndrome of left shoulder    Ulnar neuropathy of right upper extremity    Bilateral low back pain without sciatica    Fatigue    Anemia of chronic disease    Mixed hyperlipidemia    DJD (degenerative joint disease)    Diverticulosis of large intestine without hemorrhage    Gastroesophageal reflux disease without esophagitis    Kidney transplant recipient    Long-term use of immunosuppressant medication    At risk for opportunistic infections    Vitamin D deficiency    Disequilibrium    Chronic anemia    Lumbar stenosis with neurogenic claudication    Parvovirus B19 infection    Other spondylosis, lumbar region    Mixed diabetic hyperlipidemia associated with type 2 diabetes mellitus    Essential hypertension     Primary osteoarthritis involving multiple joints    History of colon polyps    Secondary hyperparathyroidism of renal origin    Mild intermittent asthma without complication    Chronic left shoulder pain    Immunosuppression    Benzodiazepine  dependence, continuous    Abdominal pain due to injury    Vertigo    Abnormal CT of liver    New daily persistent headache    NAFLD (nonalcoholic fatty liver disease)    Stage 3a chronic kidney disease    Dysphagia    Muscular fasciculation    Renovascular hypertension    Nausea    Pericarditis    Coronary artery disease involving native coronary artery of native heart without angina pectoris    Aortic atherosclerosis    Tremor    Type 2 diabetes mellitus with stage 3a chronic kidney disease, with long-term current use of insulin    Aortic regurgitation, mild to moderate    Gastroenteritis    Multiple joint pain    White matter disease    Complication of arteriovenous dialysis fistula    Post-viral cough syndrome    Abnormal urinalysis    Weakness of both lower extremities    Impaired mobility and activities of daily living   [3]   Social History  Socioeconomic History    Marital status: Significant Other     Spouse name: Any Alvarez    Number of children: 2    Highest education level: 12th grade   Occupational History    Occupation: retired     Comment:    Tobacco Use    Smoking status: Never    Smokeless tobacco: Never   Substance and Sexual Activity    Alcohol use: No     Alcohol/week: 0.0 standard drinks of alcohol    Drug use: No    Sexual activity: Yes     Partners: Female   Social History Narrative     female    Disabled since 2015 due to DDD and severe osteoarthritis of knee and hips    Previously worked as  at [employer]    Lives in residential 1 story home with partner, Any.     Has 2 boys, 1 lives in [place], 1 in [place].         Anab[employer]ist: Congregational    Hobbies: painting and crafts.       Social Drivers of Health     Financial Resource Strain: Low Risk  (7/8/2025)    Overall Financial Resource Strain (CARDIA)     Difficulty of Paying Living Expenses: Not hard at all   Food Insecurity: No Food Insecurity (7/8/2025)    Hunger Vital Sign     Worried  About Running Out of Food in the Last Year: Never true     Ran Out of Food in the Last Year: Never true   Transportation Needs: No Transportation Needs (7/8/2025)    PRAPARE - Transportation     Lack of Transportation (Medical): No     Lack of Transportation (Non-Medical): No   Recent Concern: Transportation Needs - Unmet Transportation Needs (4/30/2025)    PRAPARE - Transportation     Lack of Transportation (Medical): Yes     Lack of Transportation (Non-Medical): Yes   Physical Activity: Inactive (7/8/2025)    Exercise Vital Sign     Days of Exercise per Week: 0 days     Minutes of Exercise per Session: 0 min   Stress: Stress Concern Present (7/8/2025)    [employer] [place] [employer] - Occupational Stress Questionnaire     Feeling of Stress : To some extent   Housing Stability: Low Risk  (7/8/2025)    Housing Stability Vital Sign     Unable to Pay for Housing in the Last Year: No     Number of Times Moved in the Last Year: 1     Homeless in the Last Year: No

## 2025-08-07 NOTE — ASSESSMENT & PLAN NOTE
Suggested she bring her MRI brain recently done    Has several medications which can cause tremor Vraylar and tacrolimus  Has a parkinsonian tremor today, soft voice toe dystonia, rigidity suggestive of Pdism  Suggested a synone biopsy to differentiate drug induced Pdism vs iPD    Will ask Psychiatry team if she may start carbidopa/levodopa 25/100mg 1 tab PO TID for drug-induced Pdsim  [phone] - Dr. Roseanne Sheth

## 2025-08-08 ENCOUNTER — TELEPHONE (OUTPATIENT)
Facility: CLINIC | Age: 65
End: 2025-08-08
Payer: MEDICARE

## 2025-08-08 NOTE — TELEPHONE ENCOUNTER
Received called from Jannet [phone]  at the psychiatrist's office and Dr. Sheth would like to discuss case with dr. Hankins. Provided her phone number and texted to alert her to the request. Dr. Shteh doesn't think the tremors are med related.

## 2025-08-10 PROBLEM — R53.1 WEAKNESS: Status: ACTIVE | Noted: 2025-08-10

## 2025-08-10 PROBLEM — E11.9 TYPE 2 DIABETES MELLITUS: Chronic | Status: ACTIVE | Noted: 2025-08-10

## 2025-08-10 PROBLEM — Z71.89 ACP (ADVANCE CARE PLANNING): Status: ACTIVE | Noted: 2025-08-10

## 2025-08-10 PROBLEM — E03.9 HYPOTHYROIDISM: Chronic | Status: ACTIVE | Noted: 2025-08-10

## 2025-08-11 ENCOUNTER — PATIENT MESSAGE (OUTPATIENT)
Facility: CLINIC | Age: 65
End: 2025-08-11
Payer: MEDICARE

## 2025-08-11 PROBLEM — R53.81 DEBILITY: Status: ACTIVE | Noted: 2025-08-11

## 2025-08-12 PROBLEM — G20.A1 PARKINSON DISEASE: Status: ACTIVE | Noted: 2025-08-12

## 2025-08-14 PROBLEM — D61.818 PANCYTOPENIA: Status: ACTIVE | Noted: 2025-08-14

## 2025-08-15 ENCOUNTER — PATIENT MESSAGE (OUTPATIENT)
Dept: ENDOCRINOLOGY | Facility: CLINIC | Age: 65
End: 2025-08-15
Payer: MEDICARE

## 2025-08-15 ENCOUNTER — TELEPHONE (OUTPATIENT)
Dept: ENDOCRINOLOGY | Facility: CLINIC | Age: 65
End: 2025-08-15
Payer: MEDICARE

## 2025-08-15 ENCOUNTER — PATIENT MESSAGE (OUTPATIENT)
Dept: PRIMARY CARE CLINIC | Facility: CLINIC | Age: 65
End: 2025-08-15
Payer: MEDICARE

## 2025-08-20 ENCOUNTER — PATIENT MESSAGE (OUTPATIENT)
Dept: ENDOCRINOLOGY | Facility: CLINIC | Age: 65
End: 2025-08-20
Payer: MEDICARE

## 2025-08-20 DIAGNOSIS — N18.31 TYPE 2 DIABETES MELLITUS WITH STAGE 3A CHRONIC KIDNEY DISEASE, WITH LONG-TERM CURRENT USE OF INSULIN: ICD-10-CM

## 2025-08-20 DIAGNOSIS — E11.22 TYPE 2 DIABETES MELLITUS WITH STAGE 3A CHRONIC KIDNEY DISEASE, WITH LONG-TERM CURRENT USE OF INSULIN: ICD-10-CM

## 2025-08-20 DIAGNOSIS — Z79.4 TYPE 2 DIABETES MELLITUS WITH STAGE 3A CHRONIC KIDNEY DISEASE, WITH LONG-TERM CURRENT USE OF INSULIN: ICD-10-CM

## 2025-08-20 RX ORDER — BLOOD-GLUCOSE SENSOR
EACH MISCELLANEOUS
Qty: 9 EACH | Refills: 3 | Status: SHIPPED | OUTPATIENT
Start: 2025-08-20

## 2025-08-20 RX ORDER — INSULIN PUMP SYRINGE, 3 ML
1 EACH MISCELLANEOUS DAILY
COMMUNITY
End: 2025-08-20 | Stop reason: SDUPTHER

## 2025-08-20 RX ORDER — LANCETS
EACH MISCELLANEOUS
Qty: 100 EACH | Refills: 3 | Status: SHIPPED | OUTPATIENT
Start: 2025-08-20

## 2025-08-20 RX ORDER — INSULIN PUMP SYRINGE, 3 ML
1 EACH MISCELLANEOUS DAILY
Qty: 1 EACH | Refills: 0 | Status: SHIPPED | OUTPATIENT
Start: 2025-08-20

## 2025-08-25 ENCOUNTER — DOCUMENTATION ONLY (OUTPATIENT)
Facility: CLINIC | Age: 65
End: 2025-08-25
Payer: MEDICARE

## 2025-08-27 ENCOUNTER — PATIENT MESSAGE (OUTPATIENT)
Dept: ENDOCRINOLOGY | Facility: CLINIC | Age: 65
End: 2025-08-27
Payer: MEDICARE

## 2025-08-28 ENCOUNTER — OFFICE VISIT (OUTPATIENT)
Dept: ENDOCRINOLOGY | Facility: CLINIC | Age: 65
End: 2025-08-28
Payer: MEDICARE

## 2025-08-28 VITALS
OXYGEN SATURATION: 98 % | BODY MASS INDEX: 27.28 KG/M2 | HEART RATE: 60 BPM | SYSTOLIC BLOOD PRESSURE: 116 MMHG | HEIGHT: 66 IN | DIASTOLIC BLOOD PRESSURE: 70 MMHG | WEIGHT: 169.75 LBS

## 2025-08-28 DIAGNOSIS — N18.31 TYPE 2 DIABETES MELLITUS WITH STAGE 3A CHRONIC KIDNEY DISEASE, WITH LONG-TERM CURRENT USE OF INSULIN: Primary | ICD-10-CM

## 2025-08-28 DIAGNOSIS — I12.9 HYPERTENSION ASSOCIATED WITH STAGE 3 CHRONIC KIDNEY DISEASE DUE TO TYPE 2 DIABETES MELLITUS: ICD-10-CM

## 2025-08-28 DIAGNOSIS — Z94.0 KIDNEY TRANSPLANT RECIPIENT: ICD-10-CM

## 2025-08-28 DIAGNOSIS — N18.30 HYPERTENSION ASSOCIATED WITH STAGE 3 CHRONIC KIDNEY DISEASE DUE TO TYPE 2 DIABETES MELLITUS: ICD-10-CM

## 2025-08-28 DIAGNOSIS — E03.9 ACQUIRED HYPOTHYROIDISM: ICD-10-CM

## 2025-08-28 DIAGNOSIS — E11.22 HYPERTENSION ASSOCIATED WITH STAGE 3 CHRONIC KIDNEY DISEASE DUE TO TYPE 2 DIABETES MELLITUS: ICD-10-CM

## 2025-08-28 DIAGNOSIS — E11.69 MIXED DIABETIC HYPERLIPIDEMIA ASSOCIATED WITH TYPE 2 DIABETES MELLITUS: ICD-10-CM

## 2025-08-28 DIAGNOSIS — E78.2 MIXED DIABETIC HYPERLIPIDEMIA ASSOCIATED WITH TYPE 2 DIABETES MELLITUS: ICD-10-CM

## 2025-08-28 DIAGNOSIS — E11.22 TYPE 2 DIABETES MELLITUS WITH STAGE 3A CHRONIC KIDNEY DISEASE, WITH LONG-TERM CURRENT USE OF INSULIN: Primary | ICD-10-CM

## 2025-08-28 DIAGNOSIS — I25.10 CORONARY ARTERY DISEASE INVOLVING NATIVE CORONARY ARTERY OF NATIVE HEART WITHOUT ANGINA PECTORIS: ICD-10-CM

## 2025-08-28 DIAGNOSIS — Z79.4 TYPE 2 DIABETES MELLITUS WITH STAGE 3A CHRONIC KIDNEY DISEASE, WITH LONG-TERM CURRENT USE OF INSULIN: Primary | ICD-10-CM

## 2025-08-28 PROCEDURE — 99999 PR PBB SHADOW E&M-EST. PATIENT-LVL II: CPT | Mod: PBBFAC,,, | Performed by: NURSE PRACTITIONER

## 2025-08-28 RX ORDER — INSULIN LISPRO 100 [IU]/ML
INJECTION, SOLUTION INTRAVENOUS; SUBCUTANEOUS
Qty: 3 ML | Refills: 11 | Status: SHIPPED | OUTPATIENT
Start: 2025-08-28

## 2025-08-28 RX ORDER — BLOOD-GLUCOSE METER
EACH MISCELLANEOUS
COMMUNITY
Start: 2025-08-20

## 2025-08-28 RX ORDER — PEN NEEDLE, DIABETIC 30 GX3/16"
NEEDLE, DISPOSABLE MISCELLANEOUS
Qty: 50 EACH | Refills: 11 | Status: SHIPPED | OUTPATIENT
Start: 2025-08-28

## 2025-09-03 ENCOUNTER — OFFICE VISIT (OUTPATIENT)
Dept: PRIMARY CARE CLINIC | Facility: CLINIC | Age: 65
End: 2025-09-03
Payer: MEDICARE

## 2025-09-03 ENCOUNTER — TELEPHONE (OUTPATIENT)
Facility: CLINIC | Age: 65
End: 2025-09-03
Payer: MEDICARE

## 2025-09-03 VITALS
SYSTOLIC BLOOD PRESSURE: 100 MMHG | HEART RATE: 78 BPM | DIASTOLIC BLOOD PRESSURE: 66 MMHG | OXYGEN SATURATION: 98 % | BODY MASS INDEX: 27.33 KG/M2 | WEIGHT: 170.06 LBS | HEIGHT: 66 IN

## 2025-09-03 DIAGNOSIS — D64.9 ANEMIA, UNSPECIFIED TYPE: ICD-10-CM

## 2025-09-03 DIAGNOSIS — I12.9 HYPERTENSION ASSOCIATED WITH STAGE 3 CHRONIC KIDNEY DISEASE DUE TO TYPE 2 DIABETES MELLITUS: Chronic | ICD-10-CM

## 2025-09-03 DIAGNOSIS — N39.0 URINARY TRACT INFECTION WITHOUT HEMATURIA, SITE UNSPECIFIED: Primary | ICD-10-CM

## 2025-09-03 DIAGNOSIS — Z94.0 KIDNEY TRANSPLANT RECIPIENT: Chronic | ICD-10-CM

## 2025-09-03 DIAGNOSIS — N18.30 HYPERTENSION ASSOCIATED WITH STAGE 3 CHRONIC KIDNEY DISEASE DUE TO TYPE 2 DIABETES MELLITUS: Chronic | ICD-10-CM

## 2025-09-03 DIAGNOSIS — E44.0 MODERATE PROTEIN-CALORIE MALNUTRITION: ICD-10-CM

## 2025-09-03 DIAGNOSIS — D53.9 NUTRITIONAL ANEMIA, UNSPECIFIED: ICD-10-CM

## 2025-09-03 DIAGNOSIS — R53.81 PHYSICAL DECONDITIONING: ICD-10-CM

## 2025-09-03 DIAGNOSIS — E11.22 HYPERTENSION ASSOCIATED WITH STAGE 3 CHRONIC KIDNEY DISEASE DUE TO TYPE 2 DIABETES MELLITUS: Chronic | ICD-10-CM

## 2025-09-03 DIAGNOSIS — E78.2 MIXED DIABETIC HYPERLIPIDEMIA ASSOCIATED WITH TYPE 2 DIABETES MELLITUS: Chronic | ICD-10-CM

## 2025-09-03 DIAGNOSIS — E11.69 MIXED DIABETIC HYPERLIPIDEMIA ASSOCIATED WITH TYPE 2 DIABETES MELLITUS: Chronic | ICD-10-CM

## 2025-09-03 PROCEDURE — 99999 PR PBB SHADOW E&M-EST. PATIENT-LVL V: CPT | Mod: PBBFAC,,, | Performed by: FAMILY MEDICINE

## 2025-09-04 RX ORDER — CARBIDOPA AND LEVODOPA 25; 100 MG/1; MG/1
1 TABLET ORAL 3 TIMES DAILY
Qty: 90 TABLET | Refills: 11 | Status: SHIPPED | OUTPATIENT
Start: 2025-09-04 | End: 2026-09-04

## 2025-09-05 ENCOUNTER — TELEPHONE (OUTPATIENT)
Facility: CLINIC | Age: 65
End: 2025-09-05
Payer: MEDICARE

## (undated) DEVICE — PUNCH AORTIC 4.8MM

## (undated) DEVICE — PLUG CATHETER STERILE FOLEY

## (undated) DEVICE — SUT ETHILON 3-0 PS2 18 BLK

## (undated) DEVICE — SYS LABEL CORRECT MED

## (undated) DEVICE — STOCKINETTE 2INX36

## (undated) DEVICE — CATH DXT6JL40X DXTERITY 6F 125CM JL40

## (undated) DEVICE — PAD GROUNDING DISPER ELECTRODE

## (undated) DEVICE — SHEET DRAPE MEDIUM

## (undated) DEVICE — SYR ONLY LUER LOCK 20CC

## (undated) DEVICE — NDL HYPODERMIC BLUNT 18G 1.5IN

## (undated) DEVICE — SEE MEDLINE ITEM 156900

## (undated) DEVICE — DRAPE SLUSH WARMER WITH DISC

## (undated) DEVICE — SUT PROLENE 5-0 36IN C-1

## (undated) DEVICE — NDL SAFETY 25G X 1.5 ECLIPSE

## (undated) DEVICE — STAPLER SKIN PROXIMATE WIDE

## (undated) DEVICE — FOLEY BLLN 20FR 3WAY 5CC

## (undated) DEVICE — SUT PROLENE 6-0 BV-1 30IN

## (undated) DEVICE — CANNULA RADIOPAQUE 20G CURVED

## (undated) DEVICE — SUT PDS BV 6-0

## (undated) DEVICE — SYR DISP LL 5CC

## (undated) DEVICE — CHLORAPREP 10.5 ML APPLICATOR

## (undated) DEVICE — CLIPPER BLADE MOD 4406 (CAREF)

## (undated) DEVICE — SUT 2-0 12-18IN SILK

## (undated) DEVICE — SET DECANTER MEDICHOICE

## (undated) DEVICE — GLOVE SURG ULTRA TOUCH 7.5

## (undated) DEVICE — SUT 4-0 12-18IN SILK BLACK

## (undated) DEVICE — NDL SPINAL 25GX3.5 SPINOCAN

## (undated) DEVICE — CATH DXT6JR40X DXTERITY 6F 125CM JR40

## (undated) DEVICE — SEE MEDLINE ITEM 146417

## (undated) DEVICE — SUT SILK 2-0 STRANDS 30IN

## (undated) DEVICE — GUIDEWIRE DOUBLE ENDED .035 DIA. 150CML

## (undated) DEVICE — CATHETER DIAGNOSTIC DXTERITY 6F PIGST

## (undated) DEVICE — SYR 10CC LUER LOCK

## (undated) DEVICE — SUT SILK 3-0 STRANDS 30IN

## (undated) DEVICE — DRESSING ADHESIVE ISLAND 3 X 6

## (undated) DEVICE — GLOVE SURG ULTRA TOUCH 6

## (undated) DEVICE — SHEATH PINNACLE 6FRX10CM W/GUIDEWIRE

## (undated) DEVICE — SOL NS 1000CC

## (undated) DEVICE — ELECTRODE REM PLYHSV RETURN 9

## (undated) DEVICE — SET IRR URLGY 2LINE UNIV SPIKE

## (undated) DEVICE — SUT 3-0 12-18IN SILK

## (undated) DEVICE — TOWEL OR XRAY WHITE 17X26IN

## (undated) DEVICE — TRAY FOLEY 16FR INFECTION CONT

## (undated) DEVICE — HEMOSTAT SURGICEL NU-KNIT 6X9

## (undated) DEVICE — SUT 1 36IN PDS II VIO MONO